# Patient Record
Sex: MALE | Race: WHITE | NOT HISPANIC OR LATINO | Employment: UNEMPLOYED | URBAN - METROPOLITAN AREA
[De-identification: names, ages, dates, MRNs, and addresses within clinical notes are randomized per-mention and may not be internally consistent; named-entity substitution may affect disease eponyms.]

---

## 2017-01-02 ENCOUNTER — TRANSFERRED RECORDS (OUTPATIENT)
Dept: HEALTH INFORMATION MANAGEMENT | Facility: CLINIC | Age: 52
End: 2017-01-02

## 2017-01-04 ENCOUNTER — RADIANT APPOINTMENT (OUTPATIENT)
Dept: GENERAL RADIOLOGY | Facility: CLINIC | Age: 52
End: 2017-01-04
Attending: NURSE PRACTITIONER

## 2017-01-04 ENCOUNTER — OFFICE VISIT (OUTPATIENT)
Dept: FAMILY MEDICINE | Facility: CLINIC | Age: 52
End: 2017-01-04

## 2017-01-04 VITALS
TEMPERATURE: 98.4 F | BODY MASS INDEX: 25.21 KG/M2 | WEIGHT: 161 LBS | OXYGEN SATURATION: 97 % | SYSTOLIC BLOOD PRESSURE: 131 MMHG | DIASTOLIC BLOOD PRESSURE: 86 MMHG | RESPIRATION RATE: 16 BRPM | HEART RATE: 110 BPM

## 2017-01-04 DIAGNOSIS — R07.81 RIB PAIN: ICD-10-CM

## 2017-01-04 DIAGNOSIS — R06.02 SOB (SHORTNESS OF BREATH): ICD-10-CM

## 2017-01-04 DIAGNOSIS — J20.9 ACUTE BRONCHITIS WITH SYMPTOMS > 10 DAYS: Primary | ICD-10-CM

## 2017-01-04 DIAGNOSIS — R07.0 THROAT PAIN: ICD-10-CM

## 2017-01-04 DIAGNOSIS — Z87.891 PERSONAL HISTORY OF SMOKING: ICD-10-CM

## 2017-01-04 LAB
DEPRECATED S PYO AG THROAT QL EIA: NORMAL
MICRO REPORT STATUS: NORMAL
SPECIMEN SOURCE: NORMAL

## 2017-01-04 PROCEDURE — 87880 STREP A ASSAY W/OPTIC: CPT | Performed by: NURSE PRACTITIONER

## 2017-01-04 PROCEDURE — 99214 OFFICE O/P EST MOD 30 MIN: CPT | Performed by: NURSE PRACTITIONER

## 2017-01-04 PROCEDURE — 87081 CULTURE SCREEN ONLY: CPT | Performed by: NURSE PRACTITIONER

## 2017-01-04 PROCEDURE — 71020 XR CHEST 2 VW: CPT

## 2017-01-04 RX ORDER — AZITHROMYCIN 250 MG/1
TABLET, FILM COATED ORAL
Qty: 6 TABLET | Refills: 0 | Status: SHIPPED | OUTPATIENT
Start: 2017-01-04 | End: 2017-01-09

## 2017-01-04 RX ORDER — ALBUTEROL SULFATE 0.83 MG/ML
1 SOLUTION RESPIRATORY (INHALATION) EVERY 6 HOURS PRN
Qty: 1 BOX | Refills: 0 | Status: SHIPPED | OUTPATIENT
Start: 2017-01-04 | End: 2017-01-13

## 2017-01-04 RX ORDER — ALBUTEROL SULFATE 90 UG/1
1-2 AEROSOL, METERED RESPIRATORY (INHALATION) EVERY 4 HOURS PRN
Qty: 1 INHALER | Refills: 3 | Status: CANCELLED | OUTPATIENT
Start: 2017-01-04

## 2017-01-04 RX ORDER — OXYCODONE AND ACETAMINOPHEN 5; 325 MG/1; MG/1
1 TABLET ORAL EVERY 6 HOURS PRN
Qty: 15 TABLET | Refills: 0 | Status: SHIPPED | OUTPATIENT
Start: 2017-01-04 | End: 2017-01-13

## 2017-01-04 NOTE — PROGRESS NOTES
SUBJECTIVE:                                                    Akshat Fragoso is a 51 year old male who presents to clinic today for the following health issues:      Acute Illness   Acute illness concerns: onset 2 weeks ago with c/o URi symptoms. He has had intermittent fever. He has a history of Pneumonia 5 years ago and his symptoms today are similar.   Onset: 2 weeks     Fever: YES- 1st week    Chills/Sweats: YES    Headache (location?): no     Sinus Pressure:no    Conjunctivitis:  no    Ear Pain: no    Rhinorrhea: YES    Congestion: YES    Sore Throat: YES     Cough: YES - lot of coughing    Wheeze: YES    Decreased Appetite: YES    Nausea: no     Vomiting: no     Diarrhea:  no     Dysuria/Freq.: no     Fatigue/Achiness: YES    Sick/Strep Exposure: no      Therapies Tried and outcome: ibuprofen    Right rib pain related to coughing.   -------------------------------------    Problem list and histories reviewed & adjusted, as indicated.  Additional history: as documented    Patient Active Problem List   Diagnosis     CARDIOVASCULAR SCREENING; LDL GOAL LESS THAN 160     Acute right lumbar radiculopathy     Personal history of smoking     Past Surgical History   Procedure Laterality Date     None         Social History   Substance Use Topics     Smoking status: Former Smoker -- 0.50 packs/day for 15 years     Types: Cigarettes     Quit date: 12/01/2016     Smokeless tobacco: Former User     Quit date: 10/24/2011     Alcohol Use: No     No family history on file.      Current Outpatient Prescriptions   Medication Sig Dispense Refill     azithromycin (ZITHROMAX) 250 MG tablet Two tablets first day, then one tablet daily for four days. 6 tablet 0     albuterol (2.5 MG/3ML) 0.083% neb solution Take 1 vial (2.5 mg) by nebulization every 6 hours as needed for shortness of breath / dyspnea or wheezing 1 Box 0     oxyCODONE-acetaminophen (PERCOCET) 5-325 MG per tablet Take 1 tablet by mouth every 6 hours as needed for  pain 15 tablet 0     Ibuprofen (ADVIL PO) Take 200 mg by mouth       Naproxen Sodium (ALEVE PO) Take 550 mg by mouth At Bedtime       HYDROcodone-acetaminophen (NORCO) 5-325 MG per tablet Take 1 tablet by mouth 2 times daily as needed for moderate to severe pain 20 tablet 0     Allergies   Allergen Reactions     No Known Allergies      BP Readings from Last 3 Encounters:   01/04/17 131/86   04/25/16 132/76   03/25/16 157/83    Wt Readings from Last 3 Encounters:   01/04/17 161 lb (73.029 kg)   04/25/16 157 lb (71.215 kg)   03/25/16 153 lb (69.4 kg)                  Labs reviewed in EPIC  Problem list, Medication list, Allergies, and Medical/Social/Surgical histories reviewed in [facility] and updated as appropriate.    ROS:  C: NEGATIVE for fever, chills  E/M: NEGATIVE for ear, mouth and throat problems  R: see HPI  CV: NEGATIVE for chest pain, palpitations  GI: NEGATIVE for nausea, vomiting, diarrhea  PSYCHIATRIC: NEGATIVE for changes in mood or affect  ROS otherwise negative      OBJECTIVE:                                                    /86 mmHg  Pulse 110  Temp(Src) 98.4  F (36.9  C) (Oral)  Resp 16  Wt 161 lb (73.029 kg)  SpO2 97%  Body mass index is 25.21 kg/(m^2).  EXAM:  Constitutional: healthy, alert, active and no distress  Neck: Neck supple. + adenopathy.  ENT: Bilateral TM's are normal.  Posterior oropharynx is clear.  Nares clear without congestion.  Cardiovascular: negative, PMI normal. No lifts, heaves, or thrills. RRR. No murmurs, clicks gallops or rub, No edema or JVD.  Respiratory: Respirations easy and regular. No respiratory distress. Lungs sounds with few fine crackles left mid lung field, otherwise diminished in the bases.  Skin: warm and dry  Psychiatric: mentation appears normal. and affect normal/bright    Chest xray: possible infiltrate left mid lung     ASSESSMENT/PLAN:                                                    (J20.9) Acute bronchitis with symptoms > 10 days  (primary  encounter diagnosis)  Comment: acute  Plan: azithromycin (ZITHROMAX) 250 MG tablet,         albuterol (2.5 MG/3ML) 0.083% neb solution        I did discuss with the patient I may see an infiltrate in the left mid lung field.  He is to take the antibiotics as prescribed.  He can use the nebulizer every 4 hours as needed for wheezing/cough (he owns a nebulizer machine).   He is to monitor for new or worsening symptoms and be rechecked as needed.  Otherwise he will take his medications and will take care of himself with a healthy diet, fluids, etc.    (R06.02) SOB (shortness of breath)  Comment:   Plan: XR Chest 2 Views, albuterol (2.5 MG/3ML) 0.083%        neb solution        As above    (R07.81) Rib pain  Comment:   Plan: oxyCODONE-acetaminophen (PERCOCET) 5-325 MG per        tablet        Use sparingly only for severe pain.     (Z87.891) Personal history of smoking  Comment:   Plan: congratulated on stopping smoking.  Continue to abstaine.    (R07.0) Throat pain  Comment:   Plan: Strep, Rapid Screen, Beta strep group A culture        RST negative.        VERONICA Doran [facility]

## 2017-01-04 NOTE — MR AVS SNAPSHOT
After Visit Summary   1/4/2017    Akshat Fragoso    MRN: [id number]           Patient Information     Date Of Birth          1965        Visit Information        Provider Department      1/4/2017 1:00 PM Denise White APRN [facility]        Today's Diagnoses     Acute bronchitis with symptoms > 10 days    -  1     SOB (shortness of breath)         Rib pain         Personal history of smoking         Throat pain           Care Instructions      Bronchitis, Antibiotic Treatment (Adult)    Bronchitis is an infection of the air passages (bronchial tubes) in your lungs. It often occurs when you have a cold. This illness is contagious during the first few days and is spread through the air by coughing and sneezing, or by direct contact (touching the sick person and then touching your own eyes, nose, or mouth).  Symptoms of bronchitis include cough with mucus (phlegm) and low-grade fever. Bronchitis usually lasts 7 to 14 days. Mild cases can be treated with simple home remedies. More severe infection is treated with an antibiotic.  Home care  Follow these guidelines when caring for yourself at home:    If your symptoms are severe, rest at home for the first 2 to 3 days. When you go back to your usual activities, don't let yourself get too tired.    Do not smoke. Also avoid being exposed to secondhand smoke.    You may use over-the-counter medicines to control fever or pain, unless another medicine was prescribed. (Note: If you have chronic liver or kidney disease or have ever had a stomach ulcer or gastrointestinal bleeding, talk with your healthcare provider before using these medicines. Also talk to your provider if you are taking medicine to prevent blood clots.) Aspirin should never be given to anyone younger than 18 years of age who is ill with a viral infection or fever. It may cause severe liver or brain damage.    Your appetite may be poor, so a light diet is  fine. Avoid dehydration by drinking 6 to 8 glasses of fluids per day (such as water, soft drinks, sports drinks, juices, tea, or soup). Extra fluids will help loosen secretions in the nose and lungs.    Over-the-counter cough, cold, and sore-throat medicines will not shorten the length of the illness, but they may be helpful to reduce symptoms. (Note: Do not use decongestants if you have high blood pressure.)    Finish all antibiotic medicine. Do this even if you are feeling better after only a few days.  Follow-up care  Follow up with your healthcare provider, or as advised. If you had an X-ray or ECG (electrocardiogram), a specialist will review it. You will be notified of any new findings that may affect your care.  Note: If you are age 65 or older, or if you have a chronic lung disease or condition that affects your immune system, or you smoke, talk to your healthcare provider about having pneumococcal vaccinations and a yearly influenza vaccination (flu shot).  When to seek medical advice  Call your healthcare provider right away if any of these occur:    Fever of 100.4 F (38 C) or higher    Coughing up increased amounts of colored sputum    Weakness, drowsiness, headache, facial pain, ear pain, or a stiff neck   Call 911, or get immediate medical care  Contact emergency services right away if any of these occur.    Coughing up blood    Worsening weakness, drowsiness, headache, or stiff neck    Trouble breathing, wheezing, or pain with breathing    8585-6996 [employer]. [address] [place] [address]. All rights reserved. This information is not intended as a substitute for professional medical care. Always follow your healthcare professional's instructions.              Follow-ups after your visit        Who to contact     If you have questions or need follow up information about today's clinic visit or your schedule please contact [facility] directly at  [phone].  Normal or non-critical lab and imaging results will be communicated to you by [employer], letter or phone within 4 business days after the clinic has received the results. If you do not hear from us within 7 days, please contact the clinic through [employer] or phone. If you have a critical or abnormal lab result, we will notify you by phone as soon as possible.  Submit refill requests through [employer] or call your pharmacy and they will forward the refill request to us. Please allow 3 business days for your refill to be completed.          Additional Information About Your Visit        [employer] Information     [employer] gives you secure access to your electronic health record. If you see a primary care provider, you can also send messages to your care team and make appointments. If you have questions, please call your primary care clinic.  If you do not have a primary care provider, please call [phone] and they will assist you.        Care EveryWhere ID     This is your Care EveryWhere ID. This could be used by other organizations to access your [place] medical records  [id number]        Your Vitals Were     Pulse Temperature Respirations Pulse Oximetry          110 98.4  F (36.9  C) (Oral) 16 97%         Blood Pressure from Last 3 Encounters:   01/04/17 131/86   04/25/16 132/76   03/25/16 157/83    Weight from Last 3 Encounters:   01/04/17 161 lb (73.029 kg)   04/25/16 157 lb (71.215 kg)   03/25/16 153 lb (69.4 kg)              We Performed the Following     Beta strep group A culture     Strep, Rapid Screen          Today's Medication Changes          These changes are accurate as of: 1/4/17  1:32 PM.  If you have any questions, ask your nurse or doctor.               Start taking these medicines.        Dose/Directions    albuterol (2.5 MG/3ML) 0.083% neb solution   Used for:  Acute bronchitis with symptoms > 10 days, SOB (shortness of breath)   Started by:  Denise White APRN CNP         Dose:  1 vial   Take 1 vial (2.5 mg) by nebulization every 6 hours as needed for shortness of breath / dyspnea or wheezing   Quantity:  1 Box   Refills:  0       azithromycin 250 MG tablet   Commonly known as:  ZITHROMAX   Used for:  Acute bronchitis with symptoms > 10 days   Started by:  Denise White APRN CNP        Two tablets first day, then one tablet daily for four days.   Quantity:  6 tablet   Refills:  0       oxyCODONE-acetaminophen 5-325 MG per tablet   Commonly known as:  PERCOCET   Used for:  Rib pain   Started by:  Denise White APRN CNP        Dose:  1 tablet   Take 1 tablet by mouth every 6 hours as needed for pain   Quantity:  15 tablet   Refills:  0            Where to get your medicines      These medications were sent to [facility] - [place], [place] - [address]  [address], [place] [address]     Phone:  [phone]    - albuterol (2.5 MG/3ML) 0.083% neb solution  - azithromycin 250 MG tablet      Some of these will need a paper prescription and others can be bought over the counter.  Ask your nurse if you have questions.     Bring a paper prescription for each of these medications    - oxyCODONE-acetaminophen 5-325 MG per tablet             Primary Care Provider Office Phone # Fax #    Camille Fajardo [phone]       [facility] [address] [facility] [address]        Thank you!     Thank you for choosing [facility]  for your care. Our goal is always to provide you with excellent care. Hearing back from our patients is one way we can continue to improve our services. Please take a few minutes to complete the written survey that you may receive in the mail after your visit with us. Thank you!             Your Updated Medication List - Protect others around you: Learn how to safely use, store and throw away your medicines at www.disposemymeds.org.          This list is accurate as of:  1/4/17  1:32 PM.  Always use your most recent med list.                   Brand Name Dispense Instructions for use    ADVIL PO      Take 200 mg by mouth       albuterol (2.5 MG/3ML) 0.083% neb solution     1 Box    Take 1 vial (2.5 mg) by nebulization every 6 hours as needed for shortness of breath / dyspnea or wheezing       ALEVE PO      Take 550 mg by mouth At Bedtime       azithromycin 250 MG tablet    ZITHROMAX    6 tablet    Two tablets first day, then one tablet daily for four days.       HYDROcodone-acetaminophen 5-325 MG per tablet    NORCO    20 tablet    Take 1 tablet by mouth 2 times daily as needed for moderate to severe pain       oxyCODONE-acetaminophen 5-325 MG per tablet    PERCOCET    15 tablet    Take 1 tablet by mouth every 6 hours as needed for pain

## 2017-01-04 NOTE — NURSING NOTE
"No chief complaint on file.      Initial /86 mmHg  Pulse 115  Temp(Src) 98.4  F (36.9  C) (Oral)  Resp 20  Wt 161 lb (73.029 kg)  SpO2 94% Estimated body mass index is 25.21 kg/(m^2) as calculated from the following:    Height as of 12/17/15: 5' 7\" (1.702 m).    Weight as of this encounter: 161 lb (73.029 kg).  BP completed using cuff size: negar Cee CMA      "

## 2017-01-04 NOTE — PATIENT INSTRUCTIONS
Bronchitis, Antibiotic Treatment (Adult)    Bronchitis is an infection of the air passages (bronchial tubes) in your lungs. It often occurs when you have a cold. This illness is contagious during the first few days and is spread through the air by coughing and sneezing, or by direct contact (touching the sick person and then touching your own eyes, nose, or mouth).  Symptoms of bronchitis include cough with mucus (phlegm) and low-grade fever. Bronchitis usually lasts 7 to 14 days. Mild cases can be treated with simple home remedies. More severe infection is treated with an antibiotic.  Home care  Follow these guidelines when caring for yourself at home:    If your symptoms are severe, rest at home for the first 2 to 3 days. When you go back to your usual activities, don't let yourself get too tired.    Do not smoke. Also avoid being exposed to secondhand smoke.    You may use over-the-counter medicines to control fever or pain, unless another medicine was prescribed. (Note: If you have chronic liver or kidney disease or have ever had a stomach ulcer or gastrointestinal bleeding, talk with your healthcare provider before using these medicines. Also talk to your provider if you are taking medicine to prevent blood clots.) Aspirin should never be given to anyone younger than 18 years of age who is ill with a viral infection or fever. It may cause severe liver or brain damage.    Your appetite may be poor, so a light diet is fine. Avoid dehydration by drinking 6 to 8 glasses of fluids per day (such as water, soft drinks, sports drinks, juices, tea, or soup). Extra fluids will help loosen secretions in the nose and lungs.    Over-the-counter cough, cold, and sore-throat medicines will not shorten the length of the illness, but they may be helpful to reduce symptoms. (Note: Do not use decongestants if you have high blood pressure.)    Finish all antibiotic medicine. Do this even if you are feeling better after only a  few days.  Follow-up care  Follow up with your healthcare provider, or as advised. If you had an X-ray or ECG (electrocardiogram), a specialist will review it. You will be notified of any new findings that may affect your care.  Note: If you are age 65 or older, or if you have a chronic lung disease or condition that affects your immune system, or you smoke, talk to your healthcare provider about having pneumococcal vaccinations and a yearly influenza vaccination (flu shot).  When to seek medical advice  Call your healthcare provider right away if any of these occur:    Fever of 100.4 F (38 C) or higher    Coughing up increased amounts of colored sputum    Weakness, drowsiness, headache, facial pain, ear pain, or a stiff neck   Call 911, or get immediate medical care  Contact emergency services right away if any of these occur.    Coughing up blood    Worsening weakness, drowsiness, headache, or stiff neck    Trouble breathing, wheezing, or pain with breathing    [id number] [employer]. [address] [place] [address], [place] [address]. All rights reserved. This information is not intended as a substitute for professional medical care. Always follow your healthcare professional's instructions.

## 2017-01-06 ENCOUNTER — TELEPHONE (OUTPATIENT)
Dept: FAMILY MEDICINE | Facility: CLINIC | Age: 52
End: 2017-01-06

## 2017-01-06 ENCOUNTER — TELEPHONE (OUTPATIENT)
Dept: CARDIOLOGY | Facility: CLINIC | Age: 52
End: 2017-01-06

## 2017-01-06 DIAGNOSIS — R93.89 ABNORMAL CHEST X-RAY: Primary | ICD-10-CM

## 2017-01-06 LAB
BACTERIA SPEC CULT: NORMAL
MICRO REPORT STATUS: NORMAL
SPECIMEN SOURCE: NORMAL

## 2017-01-06 NOTE — TELEPHONE ENCOUNTER
Telephone call to the patient.  He reports today that his URI symptoms are slightly improved, but he still feels SOB with walking up and down the steps.  He is using his nebulizers for wheezing which helps.    I discussed with Akshat that his chest xray was normal, indicating a heart problem more than a respiratory problem.  This was labeled by the radiologist as CHF which would require additional work up, medications, etc.  Per Akshat, he has had several people in his life recently pass away of heart related issues, and he would like to go directly to a cardiologist.  A referral was given.  I did offer to Akshat an appointment here for additional lab studies and consideration of meds, but he prefers to go to cardiology.  He will schedule an appointment to be seen ASAP.  I did discuss that if in the meantime his SOB worsens or if he develops any new symptoms, he should be evaluated, clinic appointment or ER.  He verbalizes understanding and was appreciative of the call.

## 2017-01-06 NOTE — TELEPHONE ENCOUNTER
PT's wife called and said pt has been scheduled to see  on 1/10 d/t enlarged heart shown on recent chest xray. She said PMD has been treating pt for pneumonia but he is not improving. She wanted to know if pt will need an EKG prior to OV. I told her  will evaluate pt and decide what further testing is advised, but typically cardiologist will have an EKG done at OV. She said they are without health insurance right now but will discuss their situation further at OV with . Kaise DHZ

## 2017-01-09 ENCOUNTER — APPOINTMENT (OUTPATIENT)
Dept: GENERAL RADIOLOGY | Facility: CLINIC | Age: 52
DRG: 286 | End: 2017-01-09
Attending: EMERGENCY MEDICINE

## 2017-01-09 ENCOUNTER — TELEPHONE (OUTPATIENT)
Dept: CARDIOLOGY | Facility: CLINIC | Age: 52
End: 2017-01-09

## 2017-01-09 ENCOUNTER — APPOINTMENT (OUTPATIENT)
Dept: CARDIOLOGY | Facility: CLINIC | Age: 52
DRG: 286 | End: 2017-01-09
Attending: FAMILY MEDICINE

## 2017-01-09 ENCOUNTER — HOSPITAL ENCOUNTER (INPATIENT)
Facility: CLINIC | Age: 52
LOS: 3 days | Discharge: HOME | DRG: 286 | End: 2017-01-12
Attending: FAMILY MEDICINE | Admitting: INTERNAL MEDICINE

## 2017-01-09 DIAGNOSIS — R06.09 DOE (DYSPNEA ON EXERTION): ICD-10-CM

## 2017-01-09 DIAGNOSIS — I50.23 ACUTE ON CHRONIC SYSTOLIC CONGESTIVE HEART FAILURE (H): Primary | ICD-10-CM

## 2017-01-09 DIAGNOSIS — I50.9 CHRONIC CONGESTIVE HEART FAILURE, UNSPECIFIED CONGESTIVE HEART FAILURE TYPE: ICD-10-CM

## 2017-01-09 LAB
ALBUMIN SERPL-MCNC: 3.5 G/DL (ref 3.4–5)
ALP SERPL-CCNC: 84 U/L (ref 40–150)
ALT SERPL W P-5'-P-CCNC: 111 U/L (ref 0–70)
ANION GAP SERPL CALCULATED.3IONS-SCNC: 8 MMOL/L (ref 3–14)
AST SERPL W P-5'-P-CCNC: 55 U/L (ref 0–45)
BASOPHILS # BLD AUTO: 0 10E9/L (ref 0–0.2)
BASOPHILS NFR BLD AUTO: 0.3 %
BILIRUB DIRECT SERPL-MCNC: 0.2 MG/DL (ref 0–0.2)
BILIRUB SERPL-MCNC: 0.5 MG/DL (ref 0.2–1.3)
BUN SERPL-MCNC: 14 MG/DL (ref 7–30)
CALCIUM SERPL-MCNC: 8.3 MG/DL (ref 8.5–10.1)
CHLORIDE SERPL-SCNC: 106 MMOL/L (ref 94–109)
CO2 SERPL-SCNC: 26 MMOL/L (ref 20–32)
CREAT SERPL-MCNC: 0.85 MG/DL (ref 0.66–1.25)
CREAT SERPL-MCNC: 0.97 MG/DL (ref 0.66–1.25)
DIFFERENTIAL METHOD BLD: ABNORMAL
EOSINOPHIL # BLD AUTO: 0.1 10E9/L (ref 0–0.7)
EOSINOPHIL NFR BLD AUTO: 0.7 %
ERYTHROCYTE [DISTWIDTH] IN BLOOD BY AUTOMATED COUNT: 13.8 % (ref 10–15)
GFR SERPL CREATININE-BSD FRML MDRD: 82 ML/MIN/1.7M2
GFR SERPL CREATININE-BSD FRML MDRD: ABNORMAL ML/MIN/1.7M2
GLUCOSE SERPL-MCNC: 120 MG/DL (ref 70–99)
HCT VFR BLD AUTO: 48 % (ref 40–53)
HGB BLD-MCNC: 15.1 G/DL (ref 13.3–17.7)
IMM GRANULOCYTES # BLD: 0 10E9/L (ref 0–0.4)
IMM GRANULOCYTES NFR BLD: 0.3 %
INR PPP: 1.13 (ref 0.86–1.14)
INTERPRETATION ECG - MUSE: NORMAL
LACTATE BLD-SCNC: 1.4 MMOL/L (ref 0.7–2.1)
LYMPHOCYTES # BLD AUTO: 2.1 10E9/L (ref 0.8–5.3)
LYMPHOCYTES NFR BLD AUTO: 17.5 %
MCH RBC QN AUTO: 30.6 PG (ref 26.5–33)
MCHC RBC AUTO-ENTMCNC: 31.5 G/DL (ref 31.5–36.5)
MCV RBC AUTO: 97 FL (ref 78–100)
MONOCYTES # BLD AUTO: 0.8 10E9/L (ref 0–1.3)
MONOCYTES NFR BLD AUTO: 6.6 %
NEUTROPHILS # BLD AUTO: 8.8 10E9/L (ref 1.6–8.3)
NEUTROPHILS NFR BLD AUTO: 74.6 %
NRBC # BLD AUTO: 0 10*3/UL
NRBC BLD AUTO-RTO: 0 /100
NT-PROBNP SERPL-MCNC: ABNORMAL PG/ML (ref 0–450)
PLATELET # BLD AUTO: 239 10E9/L (ref 150–450)
POTASSIUM SERPL-SCNC: 4.9 MMOL/L (ref 3.4–5.3)
PROCALCITONIN SERPL-MCNC: NORMAL NG/ML
PROT SERPL-MCNC: 5.9 G/DL (ref 6.8–8.8)
RBC # BLD AUTO: 4.94 10E12/L (ref 4.4–5.9)
SODIUM SERPL-SCNC: 140 MMOL/L (ref 133–144)
TROPONIN I SERPL-MCNC: 0.03 UG/L (ref 0–0.04)
TSH SERPL DL<=0.005 MIU/L-ACNC: 1.34 MU/L (ref 0.4–4)
WBC # BLD AUTO: 11.7 10E9/L (ref 4–11)

## 2017-01-09 PROCEDURE — 84145 PROCALCITONIN (PCT): CPT | Performed by: EMERGENCY MEDICINE

## 2017-01-09 PROCEDURE — 99285 EMERGENCY DEPT VISIT HI MDM: CPT | Mod: 25 | Performed by: FAMILY MEDICINE

## 2017-01-09 PROCEDURE — 94640 AIRWAY INHALATION TREATMENT: CPT

## 2017-01-09 PROCEDURE — 93306 TTE W/DOPPLER COMPLETE: CPT | Mod: 26 | Performed by: INTERNAL MEDICINE

## 2017-01-09 PROCEDURE — 99222 1ST HOSP IP/OBS MODERATE 55: CPT | Mod: 25 | Performed by: INTERNAL MEDICINE

## 2017-01-09 PROCEDURE — 83605 ASSAY OF LACTIC ACID: CPT | Performed by: STUDENT IN AN ORGANIZED HEALTH CARE EDUCATION/TRAINING PROGRAM

## 2017-01-09 PROCEDURE — 80048 BASIC METABOLIC PNL TOTAL CA: CPT | Performed by: EMERGENCY MEDICINE

## 2017-01-09 PROCEDURE — 83880 ASSAY OF NATRIURETIC PEPTIDE: CPT | Performed by: EMERGENCY MEDICINE

## 2017-01-09 PROCEDURE — 85610 PROTHROMBIN TIME: CPT | Performed by: EMERGENCY MEDICINE

## 2017-01-09 PROCEDURE — 21400006 ZZH R&B CCU INTERMEDIATE UMMC

## 2017-01-09 PROCEDURE — 25000125 ZZHC RX 250: Performed by: FAMILY MEDICINE

## 2017-01-09 PROCEDURE — 84443 ASSAY THYROID STIM HORMONE: CPT | Performed by: EMERGENCY MEDICINE

## 2017-01-09 PROCEDURE — 25000125 ZZHC RX 250: Performed by: STUDENT IN AN ORGANIZED HEALTH CARE EDUCATION/TRAINING PROGRAM

## 2017-01-09 PROCEDURE — 84484 ASSAY OF TROPONIN QUANT: CPT | Performed by: EMERGENCY MEDICINE

## 2017-01-09 PROCEDURE — 25000132 ZZH RX MED GY IP 250 OP 250 PS 637: Performed by: STUDENT IN AN ORGANIZED HEALTH CARE EDUCATION/TRAINING PROGRAM

## 2017-01-09 PROCEDURE — 25000308 HC RX OP HPI UCR WEL MED 250 IP 250: Performed by: STUDENT IN AN ORGANIZED HEALTH CARE EDUCATION/TRAINING PROGRAM

## 2017-01-09 PROCEDURE — 40000275 ZZH STATISTIC RCP TIME EA 10 MIN

## 2017-01-09 PROCEDURE — 80076 HEPATIC FUNCTION PANEL: CPT | Performed by: EMERGENCY MEDICINE

## 2017-01-09 PROCEDURE — 82565 ASSAY OF CREATININE: CPT | Performed by: STUDENT IN AN ORGANIZED HEALTH CARE EDUCATION/TRAINING PROGRAM

## 2017-01-09 PROCEDURE — 36415 COLL VENOUS BLD VENIPUNCTURE: CPT | Performed by: STUDENT IN AN ORGANIZED HEALTH CARE EDUCATION/TRAINING PROGRAM

## 2017-01-09 PROCEDURE — 93010 ELECTROCARDIOGRAM REPORT: CPT | Mod: Z6 | Performed by: FAMILY MEDICINE

## 2017-01-09 PROCEDURE — 93005 ELECTROCARDIOGRAM TRACING: CPT | Performed by: FAMILY MEDICINE

## 2017-01-09 PROCEDURE — 71020 XR CHEST 2 VW: CPT

## 2017-01-09 PROCEDURE — 93306 TTE W/DOPPLER COMPLETE: CPT

## 2017-01-09 PROCEDURE — 85025 COMPLETE CBC W/AUTO DIFF WBC: CPT | Performed by: EMERGENCY MEDICINE

## 2017-01-09 RX ORDER — POTASSIUM CHLORIDE 1.5 G/1.58G
20-40 POWDER, FOR SOLUTION ORAL
Status: DISCONTINUED | OUTPATIENT
Start: 2017-01-09 | End: 2017-01-12 | Stop reason: HOSPADM

## 2017-01-09 RX ORDER — NICOTINE 21 MG/24HR
1 PATCH, TRANSDERMAL 24 HOURS TRANSDERMAL DAILY PRN
Status: DISCONTINUED | OUTPATIENT
Start: 2017-01-09 | End: 2017-01-12 | Stop reason: HOSPADM

## 2017-01-09 RX ORDER — ALBUTEROL SULFATE 0.83 MG/ML
1 SOLUTION RESPIRATORY (INHALATION) EVERY 6 HOURS PRN
Status: DISCONTINUED | OUTPATIENT
Start: 2017-01-09 | End: 2017-01-12 | Stop reason: HOSPADM

## 2017-01-09 RX ORDER — LIDOCAINE 40 MG/G
CREAM TOPICAL
Status: DISCONTINUED | OUTPATIENT
Start: 2017-01-09 | End: 2017-01-11

## 2017-01-09 RX ORDER — OXYCODONE AND ACETAMINOPHEN 5; 325 MG/1; MG/1
1 TABLET ORAL EVERY 6 HOURS PRN
Status: DISCONTINUED | OUTPATIENT
Start: 2017-01-09 | End: 2017-01-12 | Stop reason: HOSPADM

## 2017-01-09 RX ORDER — NALOXONE HYDROCHLORIDE 0.4 MG/ML
.1-.4 INJECTION, SOLUTION INTRAMUSCULAR; INTRAVENOUS; SUBCUTANEOUS
Status: DISCONTINUED | OUTPATIENT
Start: 2017-01-09 | End: 2017-01-11

## 2017-01-09 RX ORDER — LISINOPRIL 2.5 MG/1
2.5 TABLET ORAL DAILY
Status: DISCONTINUED | OUTPATIENT
Start: 2017-01-09 | End: 2017-01-10

## 2017-01-09 RX ORDER — ALBUTEROL SULFATE 90 UG/1
2 AEROSOL, METERED RESPIRATORY (INHALATION) DAILY PRN
COMMUNITY
End: 2017-01-13

## 2017-01-09 RX ORDER — POTASSIUM CHLORIDE 7.45 MG/ML
10 INJECTION INTRAVENOUS
Status: DISCONTINUED | OUTPATIENT
Start: 2017-01-09 | End: 2017-01-12 | Stop reason: HOSPADM

## 2017-01-09 RX ORDER — IPRATROPIUM BROMIDE AND ALBUTEROL SULFATE 2.5; .5 MG/3ML; MG/3ML
3 SOLUTION RESPIRATORY (INHALATION) ONCE
Status: COMPLETED | OUTPATIENT
Start: 2017-01-09 | End: 2017-01-09

## 2017-01-09 RX ORDER — POTASSIUM CHLORIDE 29.8 MG/ML
20 INJECTION INTRAVENOUS
Status: DISCONTINUED | OUTPATIENT
Start: 2017-01-09 | End: 2017-01-12 | Stop reason: HOSPADM

## 2017-01-09 RX ORDER — MAGNESIUM SULFATE HEPTAHYDRATE 40 MG/ML
4 INJECTION, SOLUTION INTRAVENOUS EVERY 4 HOURS PRN
Status: DISCONTINUED | OUTPATIENT
Start: 2017-01-09 | End: 2017-01-12 | Stop reason: HOSPADM

## 2017-01-09 RX ORDER — FUROSEMIDE 10 MG/ML
20 INJECTION INTRAMUSCULAR; INTRAVENOUS ONCE
Status: COMPLETED | OUTPATIENT
Start: 2017-01-09 | End: 2017-01-09

## 2017-01-09 RX ORDER — POTASSIUM CHLORIDE 750 MG/1
20-40 TABLET, EXTENDED RELEASE ORAL
Status: DISCONTINUED | OUTPATIENT
Start: 2017-01-09 | End: 2017-01-12 | Stop reason: HOSPADM

## 2017-01-09 RX ORDER — ASPIRIN 81 MG/1
81 TABLET ORAL DAILY
Status: DISCONTINUED | OUTPATIENT
Start: 2017-01-09 | End: 2017-01-12 | Stop reason: HOSPADM

## 2017-01-09 RX ADMIN — ALBUTEROL SULFATE 2.5 MG: 2.5 SOLUTION RESPIRATORY (INHALATION) at 19:39

## 2017-01-09 RX ADMIN — ENOXAPARIN SODIUM 40 MG: 40 INJECTION SUBCUTANEOUS at 18:59

## 2017-01-09 RX ADMIN — FUROSEMIDE 20 MG: 10 INJECTION, SOLUTION INTRAVENOUS at 18:59

## 2017-01-09 RX ADMIN — OXYCODONE HYDROCHLORIDE AND ACETAMINOPHEN 1 TABLET: 5; 325 TABLET ORAL at 18:57

## 2017-01-09 RX ADMIN — LISINOPRIL 2.5 MG: 2.5 TABLET ORAL at 18:57

## 2017-01-09 RX ADMIN — IPRATROPIUM BROMIDE AND ALBUTEROL SULFATE 3 ML: .5; 3 SOLUTION RESPIRATORY (INHALATION) at 15:16

## 2017-01-09 ASSESSMENT — ENCOUNTER SYMPTOMS
COUGH: 1
UNEXPECTED WEIGHT CHANGE: 1
SHORTNESS OF BREATH: 1
WHEEZING: 0
FEVER: 0
MYALGIAS: 1

## 2017-01-09 NOTE — H&P
CARDS 1 Admission History and Physical  Akshat Fragoso MRN: [id number]  1965  Date of Admission:1/9/2017  Primary care provider: Denise White  ___________________________________          Assessment and Plan:   Akshat Fragoso is a 51 year old male with pmh significant for tobacco abuse admitted with newly diagnosed HFrEF with LVEF 10-15%.     # New Cardiomyopathy-  # Acute decompensated heart failure with reduced EF.  No heart history. TTE with severe LV dilation, severe diffuse hypokinesis, LVEF 10-15%, Mild RV dilation, global RV fxn mildly reduced. NT ProBNP 11K, CXR with pulmonary edema, dilated IVC with abnormal respiratory variation. Patient with JOHNSON, orthopnea, pnd, LE edema, productive cough with whitish sputum. Warm extremities, lactate wnl. Ddx includes ischemic CM vs alcohol/substance induced vs HIV vs infiltrative (hemochromatosis, sarcoid, amyloid).  -Lasix 20 mg IV  -Initiated lisinopril 2.5 mg for afterload reduction. Titrate as tolerates.   -Will not start BB during acute exacerbation  -Initiated asa 81 mg qday.  -check lipid panel, HIV, ferritin, iron panel, urine tox  -RHC, LHC this admission after volume optimization enough to lie flat  -MRI heart this admission     # tachycardia- 's. Sinus rhythm. Likely 2/2 acute on chronic heart failure exacerbation. Continue to monitor. Has had elevated HR's in the past. No known history of arrhythmias. TSH wnl.  -telemetry    # Rib pain- reports rib pain from coughing.   -consider dedicated rib series in am  -continue pta percocet    # tobacco dependence- 60 pack years  -counseling regarding cessation  -nicotine replacement products    FEN: No IVF, 2g Na diet, 1.5 L fluid restriction  Prophylaxis: enoxaparin  Consults: none  Code Status: Full  Disposition: Admit to cards 1 for workup of newly diagnosed HFrEF    Patient discussed with Dr. Moy.    Alicia Fernandes MD  Internal Medicine  PGY-3  [phone]    I have seen, interviewed, and examined patient. I have reviewed the laboratory tests, imaging, and other investigations. I have reviewed the management plan with the patient. I discussed with the team and agree with the findings and plan in this resident/fellow/nurse practitioner's note. In addition, changes in the physical examination, assessment and plan have been incorporated into the note by myself, as to make it a single cohesive document.       Brynn Moy MD, MS  Cardiology/Cardiac EP Attending Staff              Chief Complaint:   JOHNSON         History of Present Illness:   Akshat Fragoso is a 51 year old male with pmh significant for tobacco abuse admitted with JOHNSON and found to have HFrEF with LVEF 10-15%.     Patient and wife provide history. Patient has been having shortness of breath with activity for the past five years, worse for the past 1 year, even worse for the past 1 month and last night was unbearable. Had severe orthopnea, pnd, rib pain from coughing (on prn percocet), JOHNSON with minimal activity such as walking 10 feet, productive cough with whitish sputum (was greenish a couple weeks ago). Denies f/c, myalgias, arthralgias, chest pain or pressure, shortness of breath at rest, n/v. Denies alcohol use, history of chemo or radiation, methamphetamine use. Endorses marijuana use. Not on any medications chronically at home.    For the past five years, patient has been treated for pna when these episodes of sob occurred. Recently completed a zpac yesterday. Also with albuterol nebs, which he thinks help a little bit.     ROS:  Complete 10 point ROS was negative unless noted in the HPI.         Past Cardiac History:     Recent Cardiac Admissions: none    Last ECHO: 1/9/2017  Procedure  Echocardiogram with two-dimensional, color and spectral Doppler performed.  ______________________________________________________________________________     Interpretation Summary  Severe left  ventricular dilation is present. Severe diffuse hypokinesis is  present. The Ejection Fraction is estimated at 10-15%. Traced at 13%.  Mild right ventricular dilation is present. Global right ventricular function  is mildly reduced.  Left ventricular diastolic function is indeterminate.  Dilation of the inferior vena cava is present with abnormal respiratory  variation in diameter.  ______________________________________________________________________________           Left Ventricle  Severe left ventricular dilation is present. Severely (EF <30%) reduced left  ventricular function is present. The Ejection Fraction is estimated at 10-  15%. Left ventricular diastolic function is indeterminate. Severe diffuse  hypokinesis is present.     Right Ventricle  Mild right ventricular dilation is present. Global right ventricular function  is mildly reduced.  Atria  Moderate left atrial enlargement is present. Mild right atrial enlargement is  present.     Mitral Valve  Mild mitral annular calcification is present. Trace to mild mitral  insufficiency is present.     Aortic Valve  Aortic valve is normal in structure and function.     Tricuspid Valve  The tricuspid valve is normal. The right ventricular systolic pressure is  approximated at 19.4 mmHg plus the right atrial pressure. Trace tricuspid  insufficiency is present.     Pulmonic Valve  The pulmonic valve is normal.     Vessels  The thoracic aorta is normal. Dilation of the inferior vena cava is present  with abnormal respiratory variation in diameter. Estimated right atrial  pressure is 10-15 mmHg.  Pericardium  Trivial pericardial effusion is present.     Compared to Previous Study  Previous study not available for comparison.     ______________________________________________________________________________  MMode/2D Measurements & Calculations  IVSd: 0.95 cm  LVIDd: 7.2 cm  LVIDs: 6.7 cm  LVPWd: 0.67 cm  FS: 6.9 %  EDV(Teich): 274.3 ml  ESV(Teich): 233.4 ml  LV  "mass(C)d: 263.3 grams  Ao root diam: 3.3 cm  asc Aorta Diam: 3.1 cm  LVOT diam: 2.1 cm  LVOT area: 3.6 cm2  LA Volume (BP): 82.0 ml     LA Volume Index (BP): 44.6 ml/m2        Doppler Measurements & Calculations  MV E max jesús: 43.9 cm/sec  MV A max jesús: 19.7 cm/sec  MV E/A: 2.2  MV dec time: 0.29 sec  PA acc time: 0.09 sec  TR max jesús: 220.4 cm/sec  TR max P.4 mmHg  Lateral E/e': 7.9  Medial E/e': 10.9    Last Angiogram: none          Past Medical History:     Past Medical History   Diagnosis Date     CARDIOVASCULAR SCREENING; LDL GOAL LESS THAN 160 2010     Pneumonia 2011             Past Surgical History:      Past Surgical History   Procedure Laterality Date     None             Social History:     Tobacco: 60 pack years. 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   Mom with CAD and MI at age 65  Dad without CAD  No sudden cardiac death         Allergies:     Allergies   Allergen Reactions     No Known Allergies              Medications:     No current facility-administered medications on file prior to encounter.  Current Outpatient Prescriptions on File Prior to Encounter:  albuterol (2.5 MG/3ML) 0.083% neb solution Take 1 vial (2.5 mg) by nebulization every 6 hours as needed for shortness of breath / dyspnea or wheezing   oxyCODONE-acetaminophen (PERCOCET) 5-325 MG per tablet Take 1 tablet by mouth every 6 hours as needed for pain            Physical Exam:   Blood pressure 129/91, pulse 107, temperature 98.1  F (36.7  C), temperature source Oral, resp. rate 16, height 1.702 m (5' 7\"), weight 71.668 kg (158 lb), SpO2 94 %.    General: NAD, pleasant, worried  HEENT: NCAT, PERRL, EOMI, OP clear and non-erythematous  Neck: No LAD, + JVD to earlobe  CV: tachycardia, no m/r/g  Resp: Good air movement, diffuse rales, no rhonchi or " wheezes  Abd: s/nt/nd, bs normoactive  Extremities: wwp, 1+ LE edema  Skin: no lesions or rashes appreciated  Neuro/Psych: AAOx4, CN 2-12 grossly intact, moving all four extremities         Data:        CMP  Recent Labs  Lab 01/09/17  1001      POTASSIUM 4.9   CHLORIDE 106   CO2 26   ANIONGAP 8   *   BUN 14   CR 0.85   GFRESTIMATED >90Non  GFR Calc   TONY 8.3*   PROTTOTAL 5.9*   ALBUMIN 3.5   BILITOTAL 0.5   ALKPHOS 84   AST 55*   *     CBC  Recent Labs  Lab 01/09/17  1001   WBC 11.7*   RBC 4.94   HGB 15.1   HCT 48.0   MCV 97   MCH 30.6   MCHC 31.5   RDW 13.8        INR  Recent Labs  Lab 01/09/17  1001   INR 1.13       Troponin Lab Results   Component Value Date    TROPI 0.033 01/09/2017     NT-pro BNP 11K    Arterial Blood GasNo lab results found in last 7 days.    EKG: tachycardia, poor R wave progression, low voltage in limb leads    IMAGING:   CXR:1/9/17  Findings:  PA and lateral view of the chest was obtained. The  cardiomediastinal silhouette is enlarged, stable. The pulmonary  vasculature is indistinct. Stable blunting of bilateral costophrenic  angles representing scarring or pleural effusions. No appreciable  pneumothorax. Relatively stable to slightly increased prominent  interstitial markings. No acute airspace opacities. Thickening of the  right minor fissure.     The visualized upper abdomen is unremarkable. No acute osseous  abnormality. Increased kyphosis of the thoracic spine.                                                                       Impression:  Pulmonary venous congestion with increased interstitial  markings consistent with mild pulmonary edema.

## 2017-01-09 NOTE — ED PROVIDER NOTES
History     Chief Complaint   Patient presents with     Shortness of Breath     HPI  Akshat Fragoso is a 51 year old male with a history of pneumonia who presents to the emergency department today with complaints of worsening shortness of breath. Patient states he has had ongoing shortness of breath and cough for the past several months. He also reports worsening dyspnea on exertion and states it has gotten to the point where he gets winded after walking only about a block. He states he has had an ongoing cough that was initially productive for green/white phlegm, though now states it is productive for more clear phlegm. He denies any significant wheezing. He reports some bilateral rib discomfort with coughing though denies any chest pain. Patient states his cough is typically worse at night. Additionally, patient reports an approximate 60 lb weight loss over that past year, though states he has been under a great deal of stress. Patient was seen in clinic on 01/04 for these symptoms and was diagnosed with bronchitis and prescribed a Z-jose, albuterol nebs and an inhaler. He was also given some oxycodone for his rib pain. Patient also underwent a chest x-ray which showed cardiomegaly and possible CHF (impression below). Patient was referred to cardiologist and has an appointment with Dr. Mata of cardiology tomorrow morning. However, patient states his symptoms became acutely worse last night. He reported significant orthopnea and states he was unable to sleep because he was struggling to breathe. He also reports some swelling and cramping in his bilateral legs. He denies a history of DVT/PE. He states his sister-in-law was recently diagnosed with bronchitis and states he has spent a lot of time with her recently. He states he did not get the flu shot this season.      Chest X-ray 01/04/17:  IMPRESSION: Cardiomegaly. Blunting both costophrenic angles by fluid  or thickened pleura. Pulmonary venous congestion with  increased  interstitial markings in both lung bases consistent with edema.  Findings are consistent with congestive failure.      I have reviewed the Medications, Allergies, Past Medical and Surgical History, and Social History in the [employer] system.    Past Medical History   Diagnosis Date     CARDIOVASCULAR SCREENING; LDL GOAL LESS THAN 160 5/9/2010     Pneumonia 11/2011       Past Surgical History   Procedure Laterality Date     None         No family history on file.    Social History   Substance Use Topics     Smoking status: Former Smoker -- 0.50 packs/day for 15 years     Types: Cigarettes     Quit date: 12/01/2016     Smokeless tobacco: Former User     Quit date: 10/24/2011     Alcohol Use: No     Current Facility-Administered Medications   Medication     lidocaine 1 % 1 mL     lidocaine (LMX4) kit     sodium chloride (PF) 0.9% PF flush 3 mL     sodium chloride (PF) 0.9% PF flush 3 mL     albuterol neb solution 2.5 mg     oxyCODONE-acetaminophen (PERCOCET) 5-325 MG per tablet 1 tablet     HOLD nitroglycerin IF     enoxaparin (LOVENOX) injection 40 mg     aspirin EC EC tablet 81 mg     lisinopril (PRINIVIL/Zestril) tablet 2.5 mg     Reason beta blocker not prescribed     potassium chloride SA (K-DUR/KLOR-CON M) CR tablet 20-40 mEq     potassium chloride (KLOR-CON) Packet 20-40 mEq     potassium chloride 10 mEq in 100 mL intermittent infusion     potassium chloride 10 mEq in 100 mL intermittent infusion with 10 mg lidocaine     potassium chloride 20 mEq in 50 mL intermittent infusion     magnesium sulfate 2 g in NS intermittent infusion ([facility] or [facility] Cmpd)     magnesium sulfate 4 g in 100 mL sterile water (premade)     naloxone (NARCAN) injection 0.1-0.4 mg        Allergies   Allergen Reactions     No Known Allergies        Review of Systems   Constitutional: Positive for unexpected weight change. Negative for fever.   Respiratory: Positive for cough and shortness of breath. Negative for wheezing.     Cardiovascular: Positive for leg swelling. Negative for chest pain.   Musculoskeletal: Positive for myalgias (leg cramping).        Bilateral rib discomfort   All other systems reviewed and are negative.      Physical Exam   BP: 126/90 mmHg  Pulse: 107  Heart Rate: 107  Temp: 98.3  F (36.8  C)  Resp: 20  Weight: 72.712 kg (160 lb 4.8 oz)  SpO2: 97 %  Physical Exam   Constitutional: He is oriented to person, place, and time. No distress.   HENT:   Head: Atraumatic.   Mouth/Throat: Oropharynx is clear and moist. No oropharyngeal exudate.   Eyes: Pupils are equal, round, and reactive to light. No scleral icterus.   Neck: Normal range of motion. Neck supple.   Cardiovascular: Normal heart sounds and intact distal pulses.    Sinus tachycardia   Pulmonary/Chest: Breath sounds normal. No respiratory distress.   Abdominal: Soft. Bowel sounds are normal. There is no tenderness.   Musculoskeletal: Normal range of motion. He exhibits no edema or tenderness.   Lymphadenopathy:     He has no cervical adenopathy.   Neurological: He is alert and oriented to person, place, and time.   Skin: Skin is warm. No rash noted. He is not diaphoretic.   Psychiatric: He has a normal mood and affect.       ED Course   Procedures   10:08 AM  The patient was seen and examined by Dr. Webster in Room ED14.              EKG Interpretation:      Interpreted by Mando Webster  Time reviewed: 0956  Symptoms at time of EKG: shortness of breath   Rhythm: sinus tachycardia  Rate: 105 bpm  Axis: Left Axis Deviation  Ectopy: none  Conduction: left bundle branch block (incomplete)  ST Segments/ T Waves: Non-specific ST-T wave changes  Q Waves: none  Comparison to prior: Unchanged from 03-Apr-2012    Clinical Impression: sinus tachycardia, incomplete left bundle branch block and LVH          Critical Care time:  none               Labs Ordered and Resulted from Time of ED Arrival Up to the Time of Departure from the ED   CBC WITH PLATELETS DIFFERENTIAL -  Abnormal; Notable for the following:     WBC 11.7 (*)     Absolute Neutrophil 8.8 (*)     All other components within normal limits   NT PROBNP INPATIENT - Abnormal; Notable for the following:     N-Terminal Pro BNP Inpatient 97964 (*)     All other components within normal limits   BASIC METABOLIC PANEL - Abnormal; Notable for the following:     Glucose 120 (*)     Calcium 8.3 (*)     All other components within normal limits   HEPATIC PANEL - Abnormal; Notable for the following:     Protein Total 5.9 (*)      (*)     AST 55 (*)     All other components within normal limits   TROPONIN I   INR   TSH WITH FREE T4 REFLEX   PERIPHERAL IV CATHETER   CARDIAC RESPIRATORY MONITOR   PULSE OXIMETRY NURSING       Assessments & Plan (with Medical Decision Making)  Idiopathic cardiomyopathy with heart failure    This patient is a 51 year old male who presents with onset of shortness of breath about 2 weeks ago, progressively worsening over the past couple of days. Chest x-ray for primary care clinic showed pulmonary vascular congestion and there was concern about whether or not this was actually heart failure verses some other ongoing process. Upon arrival here the patient had stable vital signs but was extremely short of breath when he would try to stand up or walk around but O2 sats on room air were 94%. Heart rate though was in the 110 range when he was actively walking but would go down to 80 s at rest. His chest x-ray shows pulmonary venous congestion with some increased interstitial markings consistent with some mild pulmonary edema. His troponin was normal. EKG showed no acute ST-T wave changes and normal sinus rhythm. BNP [place] at [address]. I had a formal echo performed on him which did show that he had a 10-15% EF. Patient s labs including a CBC and BMP were otherwise unremarkable. Spoke to [facility] 1 staff regarding need for admission. I spoke with CARDS 1resident as well about the clinical exam findings and  lab and x-ray results. Patient will be admitted to the [facility] 1 service and patient is agreeable to coming in.      I have reviewed the nursing notes.    I have reviewed the findings, diagnosis, plan and need for follow up with the patient.    Current Discharge Medication List          Final diagnoses:   JOHNSON (dyspnea on exertion)   Chronic congestive heart failure, unspecified congestive heart failure type (H)   ITheresa, am serving as a trained medical scribe to document services personally performed by Mando Webster MD, based on the provider's statements to me.      Mando JAUREGUI MD, was physically present and have reviewed and verified the accuracy of this note documented by Theresa Mcelroy.       1/9/2017   [facility], [place], EMERGENCY DEPARTMENT      Mando Webster MD  01/09/17 1953

## 2017-01-09 NOTE — IP AVS SNAPSHOT
MRN:[id number]                      After Visit Summary   1/9/2017    Akshat Fragoso    MRN: [id number]           Thank you!     Thank you for choosing [facility] for your care. Our goal is always to provide you with excellent care. Hearing back from our patients is one way we can continue to improve our services. Please take a few minutes to complete the written survey that you may receive in the mail after you visit with us. Thank you!        Patient Information     Date Of Birth          1965        About your hospital stay     You were admitted on:  January 9, 2017 You last received care in the:  [address] [facility]    You were discharged on:  January 12, 2017        Reason for your hospital stay       You were in the hospital for a new diagnosis of heart failure                  Who to Call     For medical emergencies, please call 911.  For non-urgent questions about your medical care, please call your primary care provider or clinic, [phone]          Attending Provider     Provider    Mando Webster MD Chen, Brynn Graham MD       Primary Care Provider Office Phone # Fax #    VERONICA Hallman [facility] [phone]       [facility] [address] [place] [address]        After Care Instructions     Activity       Your activity upon discharge: activity as tolerated            Diet       Follow this diet upon discharge: Fluid restriction 2000 ML FLUID  Snacks/Supplements Adult: Ensure Plus (Adult); Between Meals  3 Gram Sodium Diet            Monitor and record       weight every day; if up 3lbs start taking lasix 20mg daily and call physician                  Follow-up Appointments     Adult [facility] Follow-up and recommended labs and tests       Follow up with primary care provider, Denise White, within 7 days for hospital follow- up.  No follow up labs or test are needed. Follow-up on TB Gold Quant Test      Appointments on  "[place] and/or [facility] (with [facility] or [facility] provider or service). Call [phone] if you haven't heard regarding these appointments within 7 days of discharge.                  Additional Services     Cardiology Eval Adult Referral       Your provider has referred you to:  [facility] [place] [phone]   [email or web address]    Please be aware that coverage of these services is subject to the terms and limitations of your health insurance plan.  Call member services at your health plan with any benefit or coverage questions.      Type of Referral:  EP Follow Up    Timeframe requested:  Please make appointment for 01/23/16 per Dr. Moy will fit into schedule for this day.     Please bring the following to your appointment:  >>   Any x-rays, CTs or MRIs which have been performed.  Contact the facility where they were done to arrange for  prior to your scheduled appointment.    >>   List of current medications  >>   This referral request   >>   Any documents/labs given to you for this referral                  General Recommendations To Control Heart Failure When You Get Home     Instructions To Patients and Families: Please read and check off each of these important instructions as you do them when you get home.           Weight and symptoms      ___ Put a scale in your bathroom  ___ Post a weight chart or calendar next to the scale  ___Weigh yourself every day as soon as you you get up in the morning. You should only be wearing your pajamas. Write your weight on the chart/calendar.  ___ Bring your weight chart/calendar with you to all appointments    ___Call your doctor if you gain 2 pounds in 1 day or 5 pounds in 1 week from your \"dry\" weight (baseline weight). Also call your doctor if you have shortness of breath that gets worse over time, leg swelling or fatigue.         Medicines and diet     ___ Make sure to take your " medicines as prescribed.    ___Bring a current list of your medicines and all of your medicine bottles with you to all appointments.    ___ Limit fluids if you still have swelling or shortness of breath, or if your doctor tells you to do so.  ___ Eat less than 2000 mg of sodium (salt) every day. Read food labels, and do not add salt to meals.   ___ Heart healthy diet with low fat and low cholesterol          Activity and suggested lifestyle changes    ___ Stay active. Talk to your doctor about an exercise program that is safe for your heart.    ___ Stop smoking. Reduce alcohol use.      ___ Lose weight if you are overweight. Extra weight puts a lot of stress on the heart.          Control for Leg Swelling   ___ Keep your legs elevated (raised) as needed for swelling. If swelling is uncomfortable or elevation doesn t help, ask your doctor about using ACE wrap or Jobst stockings.          Follow-up appointments   ___ Make a [facility] appointment with a basic metabolic panel lab draw 3 to 5 days after you leave the hospital. Call one of the following locations:   [facility]  [phone],  [facility] [phone],  [facility]  [phone].     ___ Make sure to take your medications as prescribed and bring an accurate list of your medications and your weight chart/calendar to your follow up appointment at the [facility] Clinic for continued education and adjustments          What is the CORE clinic?    The C.O.R.E (Cardiomyopathy, Optimization, Rehabilitation, Education) Clinic is a heart failure specialty clinic within the [facility]. At [facility].O.R.E., you will work with nurse practitioners to carefully adjust medicines, get education and learn who and when to call if symptoms appear. [facility].O.R.E nurses specialize in helping you:    better understand your disease.    slow the progress of your  "disease.    improve the length and quality of your life.    detect future heart problems before they become life threatening.    avoid hospital stays.            Pending Results     Date and Time Order Name Status Description    1/11/2017 1622 M Tuberculosis by Quantiferon ! Follow QTB collection process In process             Statement of Approval     Ordered          01/12/17 1224  I have reviewed and agree with all the recommendations and orders detailed in this document.   EFFECTIVE NOW     Approved and electronically signed by:  Brynn Moy MD             Admission Information        Provider Department Dept Phone    1/9/2017 Brynn Moy MD [facility] [phone]      Your Vitals Were     Blood Pressure Pulse Temperature    96/68 mmHg 107 98.2  F (36.8  C) (Oral)    Respirations Height Weight    18 1.702 m (5' 7\") 65.862 kg (145 lb 3.2 oz)    BMI (Body Mass Index) Pulse Oximetry       22.74 kg/m2 96%       MyChart Information     [facility] gives you secure access to your electronic health record. If you see a primary care provider, you can also send messages to your care team and make appointments. If you have questions, please call your primary care clinic.  If you do not have a primary care provider, please call [phone] and they will assist you.        Care EveryWhere ID     This is your Care EveryWhere ID. This could be used by other organizations to access your [facility] medical records  [id number]           Review of your medicines      START taking        Dose / Directions    aspirin 81 MG EC tablet   Used for:  Chronic congestive heart failure, unspecified congestive heart failure type (H)        Dose:  81 mg   Take 1 tablet (81 mg) by mouth daily   Quantity:  30 tablet   Refills:  2       furosemide 20 MG tablet   Commonly known as:  LASIX   Used for:  JOHNSON (dyspnea on exertion), Chronic congestive heart failure, unspecified congestive heart failure type (H), Acute on chronic systolic congestive " heart failure (H)        Dose:  20 mg   Take 1 tablet (20 mg) by mouth daily as needed Only take if weight up by 3lbs   Quantity:  60 tablet   Refills:  1       lisinopril 5 MG tablet   Commonly known as:  PRINIVIL/ZESTRIL   Used for:  Chronic congestive heart failure, unspecified congestive heart failure type (H)        Dose:  5 mg   Take 1 tablet (5 mg) by mouth daily   Quantity:  30 tablet   Refills:  2       metoprolol 25 MG 24 hr tablet   Commonly known as:  TOPROL-XL   Used for:  Chronic congestive heart failure, unspecified congestive heart failure type (H)        Dose:  12.5 mg   Take 0.5 tablets (12.5 mg) by mouth daily   Quantity:  15 tablet   Refills:  2         CONTINUE these medicines which have NOT CHANGED        Dose / Directions    * albuterol 108 (90 BASE) MCG/ACT Inhaler   Commonly known as:  PROAIR HFA/PROVENTIL HFA/VENTOLIN HFA        Dose:  2 puff   Inhale 2 puffs into the lungs daily as needed for shortness of breath / dyspnea or wheezing   Refills:  0       * albuterol (2.5 MG/3ML) 0.083% neb solution   Used for:  Acute bronchitis with symptoms > 10 days, SOB (shortness of breath)        Dose:  1 vial   Take 1 vial (2.5 mg) by nebulization every 6 hours as needed for shortness of breath / dyspnea or wheezing   Quantity:  1 Box   Refills:  0       oxyCODONE-acetaminophen 5-325 MG per tablet   Commonly known as:  PERCOCET   Used for:  Rib pain        Dose:  1 tablet   Take 1 tablet by mouth every 6 hours as needed for pain   Quantity:  15 tablet   Refills:  0       * Notice:  This list has 2 medication(s) that are the same as other medications prescribed for you. Read the directions carefully, and ask your doctor or other care provider to review them with you.         Where to get your medicines      These medications were sent to [facility] - [place], [place] - [address]  [address] [facility] [address] [address]     Phone:  [phone]    - aspirin 81 MG EC  tablet  - furosemide 20 MG tablet  - lisinopril 5 MG tablet  - metoprolol 25 MG 24 hr tablet             Protect others around you: Learn how to safely use, store and throw away your medicines at www.disposemymeds.org.             Medication List: This is a list of all your medications and when to take them. Check marks below indicate your daily home schedule. Keep this list as a reference.      Medications           Morning Afternoon Evening Bedtime As Needed    * albuterol 108 (90 BASE) MCG/ACT Inhaler   Commonly known as:  PROAIR HFA/PROVENTIL HFA/VENTOLIN HFA   Inhale 2 puffs into the lungs daily as needed for shortness of breath / dyspnea or wheezing                                * albuterol (2.5 MG/3ML) 0.083% neb solution   Take 1 vial (2.5 mg) by nebulization every 6 hours as needed for shortness of breath / dyspnea or wheezing   Last time this was given:  2.5 mg on 1/11/2017  7:43 AM                                aspirin 81 MG EC tablet   Take 1 tablet (81 mg) by mouth daily   Last time this was given:  81 mg on 1/12/2017  8:12 AM                                furosemide 20 MG tablet   Commonly known as:  LASIX   Take 1 tablet (20 mg) by mouth daily as needed Only take if weight up by 3lbs                                lisinopril 5 MG tablet   Commonly known as:  PRINIVIL/ZESTRIL   Take 1 tablet (5 mg) by mouth daily   Last time this was given:  5 mg on 1/12/2017 11:28 AM                                metoprolol 25 MG 24 hr tablet   Commonly known as:  TOPROL-XL   Take 0.5 tablets (12.5 mg) by mouth daily   Last time this was given:  12.5 mg on 1/12/2017  8:12 AM                                oxyCODONE-acetaminophen 5-325 MG per tablet   Commonly known as:  PERCOCET   Take 1 tablet by mouth every 6 hours as needed for pain   Last time this was given:  1 tablet on 1/12/2017  1:27 PM                                * Notice:  This list has 2 medication(s) that are the same as other medications prescribed  for you. Read the directions carefully, and ask your doctor or other care provider to review them with you.

## 2017-01-09 NOTE — IP AVS SNAPSHOT
[address] [address]    Phone:  [phone]                                       After Visit Summary   1/9/2017    Akshat Fragoso    MRN: [id number]           After Visit Summary Signature Page     I have received my discharge instructions, and my questions have been answered. I have discussed any challenges I see with this plan with the nurse or doctor.    ..........................................................................................................................................  Patient/Patient Representative Signature      ..........................................................................................................................................  Patient Representative Print Name and Relationship to Patient    ..................................................               ................................................  Date                                            Time    ..........................................................................................................................................  Reviewed by Signature/Title    ...................................................              ..............................................  Date                                                            Time

## 2017-01-09 NOTE — TELEPHONE ENCOUNTER
"Wife called requesting an OV today for her , new patient consult 1-10-17 with Dr. Mata. Patient was seen at [facility] Friday 1-6-17 for increased congestion, shortness of breath. Per wife \"Fluid on CXR.\". Wife states PMD suggested \"possible CHF\". Wife states patientis worse,  did not sleep last night due to shortness of breath and difficulty breathing. Patient has a productive  Cough,  is complaining of \"terrible bilateral leg pain\", and feels terrible. Wife was thinking of taking patient to ER.   Wife to take patient to ER for evaluation. OV as scheduled tomorrow.    "

## 2017-01-09 NOTE — ED NOTES
"Arrived to ED d/t c/o \"heart problems\" and shortness of breath, was treated w/ Z-pack and albuterol nebs and inhalers, was originally told it was pneumonia but was called a couple days ago and told it was fluid from his heart, was scheduled to see cardiologist tomorrow but symptoms have worsened, VSS upon arrival  "

## 2017-01-10 ENCOUNTER — APPOINTMENT (OUTPATIENT)
Dept: OCCUPATIONAL THERAPY | Facility: CLINIC | Age: 52
DRG: 286 | End: 2017-01-10
Attending: STUDENT IN AN ORGANIZED HEALTH CARE EDUCATION/TRAINING PROGRAM

## 2017-01-10 ENCOUNTER — APPOINTMENT (OUTPATIENT)
Dept: CT IMAGING | Facility: CLINIC | Age: 52
DRG: 286 | End: 2017-01-10

## 2017-01-10 PROBLEM — I50.21 ACUTE SYSTOLIC HEART FAILURE (H): Status: ACTIVE | Noted: 2017-01-10

## 2017-01-10 PROBLEM — I42.9 CARDIOMYOPATHY, UNSPECIFIED (H): Status: ACTIVE | Noted: 2017-01-10

## 2017-01-10 LAB
ALBUMIN UR-MCNC: NEGATIVE MG/DL
AMORPH CRY #/AREA URNS HPF: ABNORMAL /HPF
AMPHETAMINES UR QL SCN: ABNORMAL
ANION GAP SERPL CALCULATED.3IONS-SCNC: 7 MMOL/L (ref 3–14)
APPEARANCE UR: ABNORMAL
BARBITURATES UR QL: ABNORMAL
BASOPHILS # BLD AUTO: 0 10E9/L (ref 0–0.2)
BASOPHILS NFR BLD AUTO: 0.2 %
BENZODIAZ UR QL: ABNORMAL
BILIRUB UR QL STRIP: NEGATIVE
BUN SERPL-MCNC: 13 MG/DL (ref 7–30)
CALCIUM SERPL-MCNC: 8 MG/DL (ref 8.5–10.1)
CANNABINOIDS UR QL SCN: ABNORMAL
CHLORIDE SERPL-SCNC: 106 MMOL/L (ref 94–109)
CHOLEST SERPL-MCNC: 124 MG/DL
CO2 SERPL-SCNC: 27 MMOL/L (ref 20–32)
COCAINE UR QL: ABNORMAL
COLOR UR AUTO: YELLOW
CREAT SERPL-MCNC: 0.9 MG/DL (ref 0.66–1.25)
DIFFERENTIAL METHOD BLD: NORMAL
EOSINOPHIL # BLD AUTO: 0.2 10E9/L (ref 0–0.7)
EOSINOPHIL NFR BLD AUTO: 1.4 %
ERYTHROCYTE [DISTWIDTH] IN BLOOD BY AUTOMATED COUNT: 13.6 % (ref 10–15)
ETHANOL UR QL SCN: ABNORMAL
FERRITIN SERPL-MCNC: 90 NG/ML (ref 26–388)
GFR SERPL CREATININE-BSD FRML MDRD: 90 ML/MIN/1.7M2
GLUCOSE SERPL-MCNC: 102 MG/DL (ref 70–99)
GLUCOSE UR STRIP-MCNC: NEGATIVE MG/DL
HCT VFR BLD AUTO: 48.2 % (ref 40–53)
HDLC SERPL-MCNC: 39 MG/DL
HGB BLD-MCNC: 15.4 G/DL (ref 13.3–17.7)
HGB UR QL STRIP: NEGATIVE
HIV 1+2 AB+HIV1 P24 AG SERPL QL IA: NORMAL
IMM GRANULOCYTES # BLD: 0 10E9/L (ref 0–0.4)
IMM GRANULOCYTES NFR BLD: 0.2 %
INR PPP: 1.16 (ref 0.86–1.14)
IRON SATN MFR SERPL: 29 % (ref 15–46)
IRON SERPL-MCNC: 93 UG/DL (ref 35–180)
KETONES UR STRIP-MCNC: NEGATIVE MG/DL
LDLC SERPL CALC-MCNC: 64 MG/DL
LEUKOCYTE ESTERASE UR QL STRIP: NEGATIVE
LYMPHOCYTES # BLD AUTO: 3.7 10E9/L (ref 0.8–5.3)
LYMPHOCYTES NFR BLD AUTO: 34.8 %
MAGNESIUM SERPL-MCNC: 2.1 MG/DL (ref 1.6–2.3)
MCH RBC QN AUTO: 31 PG (ref 26.5–33)
MCHC RBC AUTO-ENTMCNC: 32 G/DL (ref 31.5–36.5)
MCV RBC AUTO: 97 FL (ref 78–100)
MONOCYTES # BLD AUTO: 0.9 10E9/L (ref 0–1.3)
MONOCYTES NFR BLD AUTO: 8.1 %
NEUTROPHILS # BLD AUTO: 5.9 10E9/L (ref 1.6–8.3)
NEUTROPHILS NFR BLD AUTO: 55.3 %
NITRATE UR QL: NEGATIVE
NONHDLC SERPL-MCNC: 84 MG/DL
NRBC # BLD AUTO: 0 10*3/UL
NRBC BLD AUTO-RTO: 0 /100
OPIATES UR QL SCN: ABNORMAL
PCP UR QL SCN: ABNORMAL
PH UR STRIP: 7.5 PH (ref 5–7)
PLATELET # BLD AUTO: 219 10E9/L (ref 150–450)
POTASSIUM SERPL-SCNC: 4.4 MMOL/L (ref 3.4–5.3)
PSA SERPL-MCNC: 0.32 UG/L (ref 0–4)
RADIOLOGIST FLAGS: NORMAL
RBC # BLD AUTO: 4.97 10E12/L (ref 4.4–5.9)
RBC #/AREA URNS AUTO: 0 /HPF (ref 0–2)
SODIUM SERPL-SCNC: 140 MMOL/L (ref 133–144)
SP GR UR STRIP: 1.01 (ref 1–1.03)
TIBC SERPL-MCNC: 324 UG/DL (ref 240–430)
TRANSFERRIN SERPL-MCNC: 243 MG/DL (ref 210–360)
TRIGL SERPL-MCNC: 103 MG/DL
URN SPEC COLLECT METH UR: ABNORMAL
UROBILINOGEN UR STRIP-MCNC: 4 MG/DL (ref 0–2)
WBC # BLD AUTO: 10.7 10E9/L (ref 4–11)
WBC #/AREA URNS AUTO: 0 /HPF (ref 0–2)

## 2017-01-10 PROCEDURE — 80307 DRUG TEST PRSMV CHEM ANLYZR: CPT | Performed by: STUDENT IN AN ORGANIZED HEALTH CARE EDUCATION/TRAINING PROGRAM

## 2017-01-10 PROCEDURE — 80320 DRUG SCREEN QUANTALCOHOLS: CPT | Performed by: STUDENT IN AN ORGANIZED HEALTH CARE EDUCATION/TRAINING PROGRAM

## 2017-01-10 PROCEDURE — 40000141 ZZH STATISTIC PERIPHERAL IV START W/O US GUIDANCE

## 2017-01-10 PROCEDURE — 82728 ASSAY OF FERRITIN: CPT | Performed by: STUDENT IN AN ORGANIZED HEALTH CARE EDUCATION/TRAINING PROGRAM

## 2017-01-10 PROCEDURE — 40000275 ZZH STATISTIC RCP TIME EA 10 MIN

## 2017-01-10 PROCEDURE — 25000132 ZZH RX MED GY IP 250 OP 250 PS 637: Performed by: INTERNAL MEDICINE

## 2017-01-10 PROCEDURE — 74176 CT ABD & PELVIS W/O CONTRAST: CPT

## 2017-01-10 PROCEDURE — 99232 SBSQ HOSP IP/OBS MODERATE 35: CPT | Mod: GC | Performed by: INTERNAL MEDICINE

## 2017-01-10 PROCEDURE — 25000125 ZZHC RX 250: Performed by: INTERNAL MEDICINE

## 2017-01-10 PROCEDURE — 25000308 HC RX OP HPI UCR WEL MED 250 IP 250: Performed by: STUDENT IN AN ORGANIZED HEALTH CARE EDUCATION/TRAINING PROGRAM

## 2017-01-10 PROCEDURE — 21400006 ZZH R&B CCU INTERMEDIATE UMMC

## 2017-01-10 PROCEDURE — 97535 SELF CARE MNGMENT TRAINING: CPT | Mod: GO | Performed by: OCCUPATIONAL THERAPIST

## 2017-01-10 PROCEDURE — 81001 URINALYSIS AUTO W/SCOPE: CPT | Performed by: STUDENT IN AN ORGANIZED HEALTH CARE EDUCATION/TRAINING PROGRAM

## 2017-01-10 PROCEDURE — 85025 COMPLETE CBC W/AUTO DIFF WBC: CPT | Performed by: STUDENT IN AN ORGANIZED HEALTH CARE EDUCATION/TRAINING PROGRAM

## 2017-01-10 PROCEDURE — 97165 OT EVAL LOW COMPLEX 30 MIN: CPT | Mod: GO | Performed by: OCCUPATIONAL THERAPIST

## 2017-01-10 PROCEDURE — 84153 ASSAY OF PSA TOTAL: CPT | Performed by: STUDENT IN AN ORGANIZED HEALTH CARE EDUCATION/TRAINING PROGRAM

## 2017-01-10 PROCEDURE — 80048 BASIC METABOLIC PNL TOTAL CA: CPT | Performed by: STUDENT IN AN ORGANIZED HEALTH CARE EDUCATION/TRAINING PROGRAM

## 2017-01-10 PROCEDURE — 83550 IRON BINDING TEST: CPT | Performed by: STUDENT IN AN ORGANIZED HEALTH CARE EDUCATION/TRAINING PROGRAM

## 2017-01-10 PROCEDURE — 94640 AIRWAY INHALATION TREATMENT: CPT

## 2017-01-10 PROCEDURE — 83735 ASSAY OF MAGNESIUM: CPT | Performed by: STUDENT IN AN ORGANIZED HEALTH CARE EDUCATION/TRAINING PROGRAM

## 2017-01-10 PROCEDURE — 25000132 ZZH RX MED GY IP 250 OP 250 PS 637: Performed by: STUDENT IN AN ORGANIZED HEALTH CARE EDUCATION/TRAINING PROGRAM

## 2017-01-10 PROCEDURE — 87389 HIV-1 AG W/HIV-1&-2 AB AG IA: CPT | Performed by: STUDENT IN AN ORGANIZED HEALTH CARE EDUCATION/TRAINING PROGRAM

## 2017-01-10 PROCEDURE — 84466 ASSAY OF TRANSFERRIN: CPT | Performed by: STUDENT IN AN ORGANIZED HEALTH CARE EDUCATION/TRAINING PROGRAM

## 2017-01-10 PROCEDURE — 40000133 ZZH STATISTIC OT WARD VISIT: Performed by: OCCUPATIONAL THERAPIST

## 2017-01-10 PROCEDURE — 80061 LIPID PANEL: CPT | Performed by: STUDENT IN AN ORGANIZED HEALTH CARE EDUCATION/TRAINING PROGRAM

## 2017-01-10 PROCEDURE — 83540 ASSAY OF IRON: CPT | Performed by: STUDENT IN AN ORGANIZED HEALTH CARE EDUCATION/TRAINING PROGRAM

## 2017-01-10 PROCEDURE — 85610 PROTHROMBIN TIME: CPT | Performed by: STUDENT IN AN ORGANIZED HEALTH CARE EDUCATION/TRAINING PROGRAM

## 2017-01-10 PROCEDURE — 36415 COLL VENOUS BLD VENIPUNCTURE: CPT | Performed by: STUDENT IN AN ORGANIZED HEALTH CARE EDUCATION/TRAINING PROGRAM

## 2017-01-10 RX ORDER — LISINOPRIL 5 MG/1
5 TABLET ORAL DAILY
Status: DISCONTINUED | OUTPATIENT
Start: 2017-01-10 | End: 2017-01-12 | Stop reason: HOSPADM

## 2017-01-10 RX ORDER — FUROSEMIDE 10 MG/ML
20 INJECTION INTRAMUSCULAR; INTRAVENOUS ONCE
Status: COMPLETED | OUTPATIENT
Start: 2017-01-10 | End: 2017-01-10

## 2017-01-10 RX ORDER — HEPARIN SODIUM 5000 [USP'U]/.5ML
5000 INJECTION, SOLUTION INTRAVENOUS; SUBCUTANEOUS EVERY 12 HOURS
Status: DISCONTINUED | OUTPATIENT
Start: 2017-01-10 | End: 2017-01-12 | Stop reason: HOSPADM

## 2017-01-10 RX ORDER — LANOLIN ALCOHOL/MO/W.PET/CERES
100 CREAM (GRAM) TOPICAL DAILY
Status: DISCONTINUED | OUTPATIENT
Start: 2017-01-10 | End: 2017-01-12 | Stop reason: HOSPADM

## 2017-01-10 RX ORDER — LIDOCAINE 40 MG/G
CREAM TOPICAL
Status: DISCONTINUED | OUTPATIENT
Start: 2017-01-10 | End: 2017-01-11 | Stop reason: HOSPADM

## 2017-01-10 RX ORDER — SODIUM CHLORIDE 9 MG/ML
INJECTION, SOLUTION INTRAVENOUS CONTINUOUS
Status: DISCONTINUED | OUTPATIENT
Start: 2017-01-11 | End: 2017-01-11 | Stop reason: HOSPADM

## 2017-01-10 RX ORDER — MULTIPLE VITAMINS W/ MINERALS TAB 9MG-400MCG
1 TAB ORAL DAILY
Status: DISCONTINUED | OUTPATIENT
Start: 2017-01-10 | End: 2017-01-12 | Stop reason: HOSPADM

## 2017-01-10 RX ADMIN — Medication 100 MG: at 16:06

## 2017-01-10 RX ADMIN — MULTIPLE VITAMINS W/ MINERALS TAB 1 TABLET: TAB at 16:06

## 2017-01-10 RX ADMIN — OXYCODONE HYDROCHLORIDE AND ACETAMINOPHEN 1 TABLET: 5; 325 TABLET ORAL at 10:40

## 2017-01-10 RX ADMIN — FUROSEMIDE 20 MG: 10 INJECTION, SOLUTION INTRAVENOUS at 12:33

## 2017-01-10 RX ADMIN — ALBUTEROL SULFATE 2.5 MG: 2.5 SOLUTION RESPIRATORY (INHALATION) at 06:37

## 2017-01-10 RX ADMIN — LISINOPRIL 5 MG: 5 TABLET ORAL at 10:40

## 2017-01-10 RX ADMIN — OXYCODONE HYDROCHLORIDE AND ACETAMINOPHEN 1 TABLET: 5; 325 TABLET ORAL at 18:41

## 2017-01-10 RX ADMIN — NICOTINE 1 PATCH: 21 PATCH TRANSDERMAL at 21:48

## 2017-01-10 RX ADMIN — HEPARIN SODIUM 5000 UNITS: 5000 INJECTION, SOLUTION INTRAVENOUS; SUBCUTANEOUS at 18:43

## 2017-01-10 NOTE — PROGRESS NOTES
"Cards 1 Progress Note    CC: New CM    Interval: Diuresed well with single dose of lasix. Feels better. Ambulating to Br without SOB. No signs of cardiogenic shock. Plan for addiitonal diuresis and increase ACEi today, Plan for angio/rhc/ mri tomorrow    ROS: complete ROS per hpi otherwise negative    In 360  UOP 1320  Net negative 960    160->158->151 lbs    Current meds  Lisinopriol 2.5  Lasix 20 iv x 1  LMWH 40  Nicotine TD    /88 mmHg  Pulse 107  Temp(Src) 98.6  F (37  C) (Oral)  Resp 16  Ht 1.702 m (5' 7\")  Wt 68.901 kg (151 lb 14.4 oz)  BMI 23.79 kg/m2  SpO2 97%  Comf, NAD, cachectic  JVP about 8 cm  Lungs rales present in bases b/l, otherwise good a/e  S1s2+s3  Trace LE edema, warm and well perfused    Last Basic Metabolic Panel:  NA      140   1/10/2017   POTASSIUM      4.4   1/10/2017  CHLORIDE      106   1/10/2017  TONY      8.0   1/10/2017  CO2       27   1/10/2017  BUN       13   1/10/2017  CR     0.90   1/10/2017  GLC      102   1/10/2017    WBC     10.7   1/10/2017  HGB     15.4   1/10/2017  PLT      219   1/10/2017    NTPBNP 11K  TSH wnl  LDL 64  HDL 39  Ferritin 90  Lactate 1.4  Trop negative    Echo: Severe left ventricular dilation is present (LVIDd 7.2). Severe diffuse hypokinesis is  present. The Ejection Fraction is estimated at 10-15%. Traced at 13%.  Mild right ventricular dilation is present. Global right ventricular function  is mildly reduced.  Left ventricular diastolic function is indeterminate.  Dilation of the inferior vena cava is present with abnormal respiratory  variation in diameter.    CXR: Impression:  Pulmonary venous congestion with increased interstitial  markings consistent with mild pulmonary edema.    A/P: 51M PMHx tobacco use admitted with 50 lb weight loss and new dilated CM (EF 13%).    # ADHF, new dx dilated cardiomyopathy, etiology unclear, dilation (7.2 cm) implies chronicity, pt denies drug/etoh use, no signs of cardiogenic shock, negative lactate  - " increase lisinopril to 5  - dose lasix 20 iv x 1 again today, pt approaching euvolemia  - plan for coronary angiogram/ RHC and cardiac MRI tomorrow  - prior to discharge will likely start low dose BB and place lifevest. Would benefit from espinoza outpatient as well.    # 50 lb weight loss  - pt states he was under a lot of stress and eating very little calories per day, but still concerning for occult infection or malignancy, basic workup unrevealing so far  - age appropriate guideline directed cancer screening  - nutrition consult    # Tobacco dependence  - NRT    # Ppx  - SQH    # Dispo  - pending stabilization, angiogram/RHC, MRI; anticipate at least 2 more days in hospital    Pt will be discussed with attending on rounds.    Vasquez Vale  Cardiology Fellow  [phone]    I have seen, interviewed, and examined patient. I have reviewed the laboratory tests, imaging, and other investigations. I have reviewed the management plan with the patient. I discussed with the team and agree with the findings and plan in this resident/fellow/nurse practitioner's note. In addition, changes in the physical examination, assessment and plan have been incorporated into the note by myself, as to make it a single cohesive document.       Brynn Moy MD, MS  Cardiology/Cardiac EP Attending Staff

## 2017-01-10 NOTE — PROGRESS NOTES
D: New Dx Heart Failure. SOB. Pneumonia.     I: 2LNC. Nebsx2. Provided emotional support relating to hospital stay and diagnosis.    A: A/Ox4. Denies pain. Sinus Rhythm 90s. Lungs, crackles in bases. Good urine output. Bowel sounds present. No edema. Up Independently. Rt PIV.   Temp:  [98  F (36.7  C)-98.3  F (36.8  C)] 98.1  F (36.7  C)  Pulse:  [107] 107  Heart Rate:  [] 89  Resp:  [7-26] 24  BP: ()/(75-97) 112/81 mmHg  SpO2:  [88 %-100 %] 98 %    P: Continue to monitor and assess patient with report of concerns to Cards 1 team.     Myah Devine RN 01/10/2017 7:59 AM    Hours of Care [phone]

## 2017-01-10 NOTE — PROGRESS NOTES
CLINICAL NUTRITION SERVICES - ASSESSMENT NOTE     Nutrition Prescription    RECOMMENDATIONS FOR MDs/PROVIDERS TO ORDER:  None at this time.    Malnutrition Status:    Severe malnutrition in the context of chronic illness    Recommendations already ordered by Registered Dietitian (RD):  1. Modified diet order  2. Oral supplements  3. Thiamine  4. Multivitamin with minerals    Future/Additional Recommendations:  1. Continue 3 g sodium diet as per change this afternoon. Fluid restriction per team.  2. Monitor lytes (Phos, Mg++, and K+) for refeeding syndrome.  If lytes trend low, aggressively replace.       REASON FOR ASSESSMENT  Akshat Fragoso is a/an 51 year old male assessed by the dietitian for Provider Order - New CHF, significant weight loss/ cachexia. Also received nutrition consult for 2 g sodium diet. Admission nutrition risk screen pending.    NUTRITION HISTORY  Per H & P, SOB with activity for five years, worse over the past month. No N/V.   Per discussion with pt and wife (Cheryl), pt has lost and gained some wt over the years with an overall downtrend in weight.  Pt states he has been dealing with quite a bit of stress, especially with his dad over the past two years, and pt's wife states that pt gets anxious. During these stressful times, pt may not eat and just drink caffeine. Pt noticed that he has gone down four to five pant sizes. Denies N/V, diarrhea, constipation, and abdominal pain but admits to having early satiety at times. While he previously ate three meals per day he now eats two snacks and then a larger dinner. Pt states he cooks for his family and does quite of bit of his cooking from scratch. Has not received formal low-sodium nutrition education in the past.     CURRENT NUTRITION ORDERS  Diet: 2 g Sodium and 2000 mL Fluid Restriction  Intake/Tolerance: Per chart, pt consumed 100% of meals with a good appetite 1/10.    LABS  Labs reviewed    MEDICATIONS  Medications  "reviewed    ANTHROPOMETRICS  Height: 170.2 cm (5' 7\")  Most Recent Weight: 68.901 kg (151 lb 14.4 oz)    IBW: 67.3 kg   BMI: Normal BMI  Weight History: Per chart review, pt weighed 72.2 kg (12/17/15), 71.1 kg (2/8/16), 69.4 kg (3/25/16), 71.2 kg (4/25/16) - He has lost 3.2% of his body wt over the past nine months. Note, pt lost wt previously, 2014 through the first half of 2015 especially: 92.1 kg (1/9/14), 74.8 kg (8/26/15).   Dosing Weight: 69 kg (based on lowest wt this admission of 68.9 kg on 1/10)    ASSESSED NUTRITION NEEDS  Estimated Energy Needs: 0139-1140 kcals/day (30 - 35 kcals/kg)  Justification: Increased needs with cardiac status  Estimated Protein Needs: 76-97 grams protein/day (1.1 - 1.4 grams of pro/kg)  Justification: Increased needs with cardiac status  Estimated Fluid Needs: 2000 mL/day    Justification: On a fluid restriction    PHYSICAL FINDINGS  See malnutrition section below.    MALNUTRITION  % Intake: < 75% for > 7 days (non-severe)  % Weight Loss: Weight loss does not meet criteria  Subcutaneous Fat Loss: Facial region:  Moderate-Severe, Upper arm:  Mild-moderate  Muscle Loss: Temporal:  Moderate-Severe and Thoracic region (clavicle, acromium bone, deltoid, trapezius, pectoral):  Moderate-Severe    Fluid Accumulation/Edema: Does not meet criteria  Malnutrition Diagnosis: Severe malnutrition in the context of chronic illness    NUTRITION DIAGNOSIS  Inadequate oral intake related to early satiety, stress as evidenced by pt report of eating smaller amounts of foods, severe malnutrition, and has lost four to five pant sizes.    INTERVENTIONS  Implementation  1.  Nutrition education for nutrition relationship to health/disease: Written low-sodium and fluid restriction education provided to pt and wife (Cheryl): How to Read Nutrition Labels, Low-Sodium Foods and Drinks, Tips for a Low-Sodium Diet, Managing Your Fluid Intake, and Seasoning Your Foods Without Adding Salt.  Encouraged oral " intake at meals. Also, rec increase protein intake. Discussed high protein sources. Small, frequent meals as able.   2.  Collaboration with other providers: Discussed pt with team. Per team, due to pt's weight loss, it is acceptable if he follows a 3 g sodium diet at this time.  Changed diet to 3 g sodium/day.  3.  Medical food supplement therapy: Oral supplement menu provided. Discussed supplements with pt and ordered chocolate Ensure Plus at HS.   4.  Multivitamin/mineral supplement therapy: Ordered multivitamin with minerals to ensure micronutrient needs are met. Also, ordered 100 mg thiamine daily due to cardiac status.    Goals  Patient to consume % of nutritionally adequate meal trays TID, or the equivalent with supplements/snacks.    Monitoring/Evaluation  Progress toward goals will be monitored and evaluated per protocol.     Nutrition will continue to follow.    Joann Billings, MS, RD, LD, [facility]   6C Pgr:  [phone]

## 2017-01-10 NOTE — PLAN OF CARE
Focus:  Admission  D: Diagnosis: Admitted from: [facility], [place] via: wheelchair and accompanied by: family. Arrived approximately 1800 to 6C.  I: Belongings: Placed in closet; valuables sent home with family. Admission paperwork: pending. Teaching: Call don't fall, use of console, meal times, visiting hours, orientation to unit, when to call for the RN (angina/sob/dizzyness, etc.), and stressed the importance of safety. Access: R PIV Telemetry:Placed on pt Ht./Wt.: complete  A: A&Ox3, Skin intact (see stanislav score). Up with SBA. Diet: Cardiac Diet, FR 2L :appetite fair. Rib pain managed with Percacet. Patient reported stomach reaction to Aspirin, MD notified and instructed to hold tonight's dose. Placed on 2L O2, O2 sats 95%. Other vital signs stable.   P: Administer medications per MD order, follow plan of care, and notify MD as necessary with questions/concerns.  Nita Dhillon RN

## 2017-01-10 NOTE — PROGRESS NOTES
01/10/17 1123   Quick Adds   Type of Visit Initial Occupational Therapy Evaluation   Living Environment   Lives With spouse;child(george), dependent   Living Arrangements house   Home Accessibility stairs to enter home;tub/shower is not walk in;grab bars present (bathtub)   Number of Stairs to Enter Home 11   Number of Stairs Within Home 12  (does not have to use stairs)   Transportation Available car   Living Environment Comment (Wife works full-time; no help during day)   Self-Care   Dominant Hand right   Usual Activity Tolerance good   Current Activity Tolerance poor   Regular Exercise no  (active job)   Equipment Currently Used at Home grab bar;walker, rolling   Functional Level Prior   Ambulation 0-->independent   Transferring 0-->independent   Toileting 0-->independent   Bathing 0-->independent   Dressing 0-->independent   Eating 0-->independent   Communication 0-->understands/communicates without difficulty   Swallowing 0-->swallows foods/liquids without difficulty   Cognition 0 - no cognition issues reported   Fall history within last six months no   General Information   Onset of Illness/Injury or Date of Surgery - Date 01/09/17   Referring Physician Alicia Fernandes MD   Patient/Family Goals Statement To return home Ind   Additional Occupational Profile Info/Pertinent History of Current Problem 51M PMHx tobacco use admitted with 50 lb weight loss and new dilated CM (EF 13%).   Precautions/Limitations (heart failure)   Cognitive Status Examination   Orientation orientation to person, place and time   Level of Consciousness alert   Visual Perception   Visual Perception No deficits were identified   Sensory Examination   Sensory Comments pt denies deficits   Pain Assessment   Patient Currently in Pain Yes, see Vital Sign flowsheet   Integumentary/Edema   Integumentary/Edema no deficits were identifed   Range of Motion (ROM)   ROM Comment WNL   Strength   Strength Comments WNL   Muscle Tone Assessment  "  Muscle Tone Comments Normal   Coordination   Upper Extremity Coordination No deficits were identified   Grooming   Level of [place]: Grooming independent   Instrumental Activities of Daily Living (IADL)   Previous Responsibilities (I with all IADLs)   Activities of Daily Living Analysis   Impairments Contributing to Impaired Activities of Daily Living (Deconditioning, activity tolerance)   General Therapy Interventions   Planned Therapy Interventions progressive activity/exercise;risk factor education;home program guidelines   Clinical Impression   Criteria for Skilled Therapeutic Interventions Met yes, treatment indicated   OT Diagnosis Decreased ADL/IADL tolerance   Influenced by the following impairments Deconditioning, activity tolerance   Assessment of Occupational Performance 1-3 Performance Deficits   Identified Performance Deficits Leisure, work   Clinical Decision Making (Complexity) Low complexity   Therapy Frequency 5 times/wk   Predicted Duration of Therapy Intervention (days/wks) 1/17/17   Anticipated Equipment Needs at Discharge (None)   Anticipated Discharge Disposition Home   Risks and Benefits of Treatment have been explained. Yes   Patient, Family & other staff in agreement with plan of care Yes   Clinical Impression Comments Pt would benefit from OT/CR to help increase ADLs due to heart failure   [facility] [employer] TM \"6 Clicks\"   2016, Trustees of [facility], under license to [employer].  All rights reserved.   6 Clicks Short Forms Daily Activity Inpatient Short Form   [facility] AM-PAC  \"6 Clicks\" Daily Activity Inpatient Short Form   1. Putting on and taking off regular lower body clothing? 4 - None   2. Bathing (including washing, rinsing, drying)? 4 - None   3. Toileting, which includes using toilet, bedpan or urinal? 4 - None   4. Putting on and taking off regular upper body clothing? 4 - None   5. Taking care of personal grooming such as brushing teeth? 4 - None "   6. Eating meals? 4 - None   Daily Activity Raw Score (Score out of 24.Lower scores equate to lower levels of function) 24   Total Evaluation Time   Total Evaluation Time (Minutes) 10

## 2017-01-10 NOTE — PLAN OF CARE
Problem: Goal Outcome Summary  Goal: Goal Outcome Summary  OT6C: Anticipate pt will be able to return home with assist from family. Pt is limited by fatigue, deconditioning, pain from coughing, and anxiety from recent death in family. Pt refused any OOB activity today.

## 2017-01-11 ENCOUNTER — APPOINTMENT (OUTPATIENT)
Dept: CARDIOLOGY | Facility: CLINIC | Age: 52
DRG: 286 | End: 2017-01-11

## 2017-01-11 ENCOUNTER — APPOINTMENT (OUTPATIENT)
Dept: MRI IMAGING | Facility: CLINIC | Age: 52
DRG: 286 | End: 2017-01-11

## 2017-01-11 PROBLEM — I42.8 CARDIOMYOPATHY, NONISCHEMIC (H): Status: ACTIVE | Noted: 2017-01-11

## 2017-01-11 LAB
ANION GAP SERPL CALCULATED.3IONS-SCNC: 12 MMOL/L (ref 3–14)
BASOPHILS # BLD AUTO: 0 10E9/L (ref 0–0.2)
BASOPHILS NFR BLD AUTO: 0.2 %
BUN SERPL-MCNC: 16 MG/DL (ref 7–30)
CALCIUM SERPL-MCNC: 8.5 MG/DL (ref 8.5–10.1)
CHLORIDE SERPL-SCNC: 105 MMOL/L (ref 94–109)
CO2 SERPL-SCNC: 21 MMOL/L (ref 20–32)
CREAT SERPL-MCNC: 0.94 MG/DL (ref 0.66–1.25)
DIFFERENTIAL METHOD BLD: ABNORMAL
EOSINOPHIL # BLD AUTO: 0.1 10E9/L (ref 0–0.7)
EOSINOPHIL NFR BLD AUTO: 0.9 %
ERYTHROCYTE [DISTWIDTH] IN BLOOD BY AUTOMATED COUNT: 14 % (ref 10–15)
GFR SERPL CREATININE-BSD FRML MDRD: 85 ML/MIN/1.7M2
GLUCOSE SERPL-MCNC: 90 MG/DL (ref 70–99)
HCT VFR BLD AUTO: 53.1 % (ref 40–53)
HGB BLD-MCNC: 16.6 G/DL (ref 13.3–17.7)
IMM GRANULOCYTES # BLD: 0 10E9/L (ref 0–0.4)
IMM GRANULOCYTES NFR BLD: 0.2 %
INR PPP: 1.09 (ref 0.86–1.14)
LACTATE BLD-SCNC: 1.2 MMOL/L (ref 0.7–2.1)
LYMPHOCYTES # BLD AUTO: 2.8 10E9/L (ref 0.8–5.3)
LYMPHOCYTES NFR BLD AUTO: 23.3 %
MAGNESIUM SERPL-MCNC: 2.1 MG/DL (ref 1.6–2.3)
MCH RBC QN AUTO: 30.9 PG (ref 26.5–33)
MCHC RBC AUTO-ENTMCNC: 31.3 G/DL (ref 31.5–36.5)
MCV RBC AUTO: 99 FL (ref 78–100)
MONOCYTES # BLD AUTO: 0.8 10E9/L (ref 0–1.3)
MONOCYTES NFR BLD AUTO: 6.9 %
NEUTROPHILS # BLD AUTO: 8.3 10E9/L (ref 1.6–8.3)
NEUTROPHILS NFR BLD AUTO: 68.5 %
NRBC # BLD AUTO: 0 10*3/UL
NRBC BLD AUTO-RTO: 0 /100
PLATELET # BLD AUTO: 193 10E9/L (ref 150–450)
POTASSIUM SERPL-SCNC: 4.2 MMOL/L (ref 3.4–5.3)
RBC # BLD AUTO: 5.37 10E12/L (ref 4.4–5.9)
SODIUM SERPL-SCNC: 138 MMOL/L (ref 133–144)
WBC # BLD AUTO: 12.1 10E9/L (ref 4–11)

## 2017-01-11 PROCEDURE — 80048 BASIC METABOLIC PNL TOTAL CA: CPT | Performed by: STUDENT IN AN ORGANIZED HEALTH CARE EDUCATION/TRAINING PROGRAM

## 2017-01-11 PROCEDURE — 21400006 ZZH R&B CCU INTERMEDIATE UMMC

## 2017-01-11 PROCEDURE — 99152 MOD SED SAME PHYS/QHP 5/>YRS: CPT

## 2017-01-11 PROCEDURE — 93456 R HRT CORONARY ARTERY ANGIO: CPT

## 2017-01-11 PROCEDURE — 99232 SBSQ HOSP IP/OBS MODERATE 35: CPT | Mod: 25 | Performed by: INTERNAL MEDICINE

## 2017-01-11 PROCEDURE — 25000125 ZZHC RX 250: Performed by: STUDENT IN AN ORGANIZED HEALTH CARE EDUCATION/TRAINING PROGRAM

## 2017-01-11 PROCEDURE — A9585 GADOBUTROL INJECTION: HCPCS | Performed by: INTERNAL MEDICINE

## 2017-01-11 PROCEDURE — 83735 ASSAY OF MAGNESIUM: CPT | Performed by: STUDENT IN AN ORGANIZED HEALTH CARE EDUCATION/TRAINING PROGRAM

## 2017-01-11 PROCEDURE — 25000125 ZZHC RX 250: Performed by: INTERNAL MEDICINE

## 2017-01-11 PROCEDURE — 86480 TB TEST CELL IMMUN MEASURE: CPT | Performed by: INTERNAL MEDICINE

## 2017-01-11 PROCEDURE — 25500045 ZZH RX 255: Performed by: INTERNAL MEDICINE

## 2017-01-11 PROCEDURE — C1894 INTRO/SHEATH, NON-LASER: HCPCS

## 2017-01-11 PROCEDURE — 27210742 ZZH CATH CR1

## 2017-01-11 PROCEDURE — 40000556 ZZH STATISTIC PERIPHERAL IV START W US GUIDANCE

## 2017-01-11 PROCEDURE — 85610 PROTHROMBIN TIME: CPT | Performed by: STUDENT IN AN ORGANIZED HEALTH CARE EDUCATION/TRAINING PROGRAM

## 2017-01-11 PROCEDURE — 36415 COLL VENOUS BLD VENIPUNCTURE: CPT | Performed by: STUDENT IN AN ORGANIZED HEALTH CARE EDUCATION/TRAINING PROGRAM

## 2017-01-11 PROCEDURE — 27210787 ZZH MANIFOLD CR2

## 2017-01-11 PROCEDURE — 27211089 ZZH KIT ACIST INJECTOR CR3

## 2017-01-11 PROCEDURE — 25000308 HC RX OP HPI UCR WEL MED 250 IP 250: Performed by: STUDENT IN AN ORGANIZED HEALTH CARE EDUCATION/TRAINING PROGRAM

## 2017-01-11 PROCEDURE — 85025 COMPLETE CBC W/AUTO DIFF WBC: CPT | Performed by: STUDENT IN AN ORGANIZED HEALTH CARE EDUCATION/TRAINING PROGRAM

## 2017-01-11 PROCEDURE — 94640 AIRWAY INHALATION TREATMENT: CPT

## 2017-01-11 PROCEDURE — B2111ZZ FLUOROSCOPY OF MULTIPLE CORONARY ARTERIES USING LOW OSMOLAR CONTRAST: ICD-10-PCS | Performed by: INTERNAL MEDICINE

## 2017-01-11 PROCEDURE — 40000275 ZZH STATISTIC RCP TIME EA 10 MIN

## 2017-01-11 PROCEDURE — 83605 ASSAY OF LACTIC ACID: CPT | Performed by: INTERNAL MEDICINE

## 2017-01-11 PROCEDURE — 93456 R HRT CORONARY ARTERY ANGIO: CPT | Mod: 26 | Performed by: INTERNAL MEDICINE

## 2017-01-11 PROCEDURE — 27210946 ZZH KIT HC TOTES DISP CR8

## 2017-01-11 PROCEDURE — 25000132 ZZH RX MED GY IP 250 OP 250 PS 637: Performed by: STUDENT IN AN ORGANIZED HEALTH CARE EDUCATION/TRAINING PROGRAM

## 2017-01-11 PROCEDURE — 4A023N6 MEASUREMENT OF CARDIAC SAMPLING AND PRESSURE, RIGHT HEART, PERCUTANEOUS APPROACH: ICD-10-PCS | Performed by: INTERNAL MEDICINE

## 2017-01-11 PROCEDURE — 99153 MOD SED SAME PHYS/QHP EA: CPT

## 2017-01-11 PROCEDURE — 75561 CARDIAC MRI FOR MORPH W/DYE: CPT

## 2017-01-11 PROCEDURE — 36415 COLL VENOUS BLD VENIPUNCTURE: CPT | Performed by: INTERNAL MEDICINE

## 2017-01-11 PROCEDURE — 75561 CARDIAC MRI FOR MORPH W/DYE: CPT | Mod: 26 | Performed by: INTERNAL MEDICINE

## 2017-01-11 PROCEDURE — 25000132 ZZH RX MED GY IP 250 OP 250 PS 637: Performed by: INTERNAL MEDICINE

## 2017-01-11 PROCEDURE — 27211181 ZZH BALLOON TIP PRESSURE CR5

## 2017-01-11 RX ORDER — FENTANYL CITRATE 50 UG/ML
25-50 INJECTION, SOLUTION INTRAMUSCULAR; INTRAVENOUS
Status: DISCONTINUED | OUTPATIENT
Start: 2017-01-11 | End: 2017-01-11 | Stop reason: HOSPADM

## 2017-01-11 RX ORDER — METHYLPREDNISOLONE SODIUM SUCCINATE 125 MG/2ML
125 INJECTION, POWDER, LYOPHILIZED, FOR SOLUTION INTRAMUSCULAR; INTRAVENOUS
Status: DISCONTINUED | OUTPATIENT
Start: 2017-01-11 | End: 2017-01-11 | Stop reason: HOSPADM

## 2017-01-11 RX ORDER — IOPAMIDOL 755 MG/ML
46 INJECTION, SOLUTION INTRAVASCULAR ONCE
Status: COMPLETED | OUTPATIENT
Start: 2017-01-11 | End: 2017-01-11

## 2017-01-11 RX ORDER — NALOXONE HYDROCHLORIDE 0.4 MG/ML
.2-.4 INJECTION, SOLUTION INTRAMUSCULAR; INTRAVENOUS; SUBCUTANEOUS
Status: ACTIVE | OUTPATIENT
Start: 2017-01-11 | End: 2017-01-11

## 2017-01-11 RX ORDER — PHENYLEPHRINE HCL IN 0.9% NACL 1 MG/10 ML
20-100 SYRINGE (ML) INTRAVENOUS
Status: DISCONTINUED | OUTPATIENT
Start: 2017-01-11 | End: 2017-01-11 | Stop reason: HOSPADM

## 2017-01-11 RX ORDER — FLUMAZENIL 0.1 MG/ML
0.2 INJECTION, SOLUTION INTRAVENOUS
Status: DISCONTINUED | OUTPATIENT
Start: 2017-01-11 | End: 2017-01-11 | Stop reason: HOSPADM

## 2017-01-11 RX ORDER — PRASUGREL 10 MG/1
10-60 TABLET, FILM COATED ORAL
Status: DISCONTINUED | OUTPATIENT
Start: 2017-01-11 | End: 2017-01-11 | Stop reason: HOSPADM

## 2017-01-11 RX ORDER — CLOPIDOGREL BISULFATE 75 MG/1
300-600 TABLET ORAL
Status: DISCONTINUED | OUTPATIENT
Start: 2017-01-11 | End: 2017-01-11 | Stop reason: HOSPADM

## 2017-01-11 RX ORDER — PROTAMINE SULFATE 10 MG/ML
1-5 INJECTION, SOLUTION INTRAVENOUS
Status: DISCONTINUED | OUTPATIENT
Start: 2017-01-11 | End: 2017-01-11 | Stop reason: HOSPADM

## 2017-01-11 RX ORDER — ADENOSINE 3 MG/ML
12-12000 INJECTION, SOLUTION INTRAVENOUS
Status: DISCONTINUED | OUTPATIENT
Start: 2017-01-11 | End: 2017-01-11 | Stop reason: HOSPADM

## 2017-01-11 RX ORDER — ASPIRIN 325 MG
325 TABLET ORAL
Status: DISCONTINUED | OUTPATIENT
Start: 2017-01-11 | End: 2017-01-11 | Stop reason: HOSPADM

## 2017-01-11 RX ORDER — DEXTROSE MONOHYDRATE 25 G/50ML
12.5-5 INJECTION, SOLUTION INTRAVENOUS EVERY 30 MIN PRN
Status: DISCONTINUED | OUTPATIENT
Start: 2017-01-11 | End: 2017-01-11 | Stop reason: HOSPADM

## 2017-01-11 RX ORDER — ARGATROBAN 1 MG/ML
150 INJECTION, SOLUTION INTRAVENOUS
Status: DISCONTINUED | OUTPATIENT
Start: 2017-01-11 | End: 2017-01-11 | Stop reason: HOSPADM

## 2017-01-11 RX ORDER — LORAZEPAM 2 MG/ML
.5-2 INJECTION INTRAMUSCULAR EVERY 4 HOURS PRN
Status: DISCONTINUED | OUTPATIENT
Start: 2017-01-11 | End: 2017-01-11 | Stop reason: HOSPADM

## 2017-01-11 RX ORDER — CLOPIDOGREL BISULFATE 75 MG/1
75 TABLET ORAL
Status: DISCONTINUED | OUTPATIENT
Start: 2017-01-11 | End: 2017-01-11 | Stop reason: HOSPADM

## 2017-01-11 RX ORDER — NITROGLYCERIN 5 MG/ML
100-200 VIAL (ML) INTRAVENOUS
Status: DISCONTINUED | OUTPATIENT
Start: 2017-01-11 | End: 2017-01-11 | Stop reason: HOSPADM

## 2017-01-11 RX ORDER — SODIUM NITROPRUSSIDE 25 MG/ML
100-200 INJECTION INTRAVENOUS
Status: DISCONTINUED | OUTPATIENT
Start: 2017-01-11 | End: 2017-01-11 | Stop reason: HOSPADM

## 2017-01-11 RX ORDER — FLUMAZENIL 0.1 MG/ML
0.2 INJECTION, SOLUTION INTRAVENOUS
Status: DISCONTINUED | OUTPATIENT
Start: 2017-01-11 | End: 2017-01-11

## 2017-01-11 RX ORDER — FLUMAZENIL 0.1 MG/ML
0.2 INJECTION, SOLUTION INTRAVENOUS
Status: ACTIVE | OUTPATIENT
Start: 2017-01-11 | End: 2017-01-11

## 2017-01-11 RX ORDER — NITROGLYCERIN 0.4 MG/1
0.4 TABLET SUBLINGUAL EVERY 5 MIN PRN
Status: DISCONTINUED | OUTPATIENT
Start: 2017-01-11 | End: 2017-01-11 | Stop reason: HOSPADM

## 2017-01-11 RX ORDER — NIFEDIPINE 10 MG/1
10 CAPSULE ORAL
Status: DISCONTINUED | OUTPATIENT
Start: 2017-01-11 | End: 2017-01-11 | Stop reason: HOSPADM

## 2017-01-11 RX ORDER — DOPAMINE HYDROCHLORIDE 160 MG/100ML
2-20 INJECTION, SOLUTION INTRAVENOUS CONTINUOUS PRN
Status: DISCONTINUED | OUTPATIENT
Start: 2017-01-11 | End: 2017-01-11 | Stop reason: HOSPADM

## 2017-01-11 RX ORDER — PROMETHAZINE HYDROCHLORIDE 25 MG/ML
6.25-25 INJECTION, SOLUTION INTRAMUSCULAR; INTRAVENOUS EVERY 4 HOURS PRN
Status: DISCONTINUED | OUTPATIENT
Start: 2017-01-11 | End: 2017-01-11 | Stop reason: HOSPADM

## 2017-01-11 RX ORDER — DIPHENHYDRAMINE HYDROCHLORIDE 50 MG/ML
25-50 INJECTION INTRAMUSCULAR; INTRAVENOUS
Status: DISCONTINUED | OUTPATIENT
Start: 2017-01-11 | End: 2017-01-11 | Stop reason: HOSPADM

## 2017-01-11 RX ORDER — VERAPAMIL HYDROCHLORIDE 2.5 MG/ML
1-2.5 INJECTION, SOLUTION INTRAVENOUS
Status: DISCONTINUED | OUTPATIENT
Start: 2017-01-11 | End: 2017-01-11 | Stop reason: HOSPADM

## 2017-01-11 RX ORDER — EPTIFIBATIDE 2 MG/ML
2 INJECTION, SOLUTION INTRAVENOUS CONTINUOUS PRN
Status: DISCONTINUED | OUTPATIENT
Start: 2017-01-11 | End: 2017-01-11 | Stop reason: HOSPADM

## 2017-01-11 RX ORDER — FENTANYL CITRATE 50 UG/ML
25-50 INJECTION, SOLUTION INTRAMUSCULAR; INTRAVENOUS
Status: DISCONTINUED | OUTPATIENT
Start: 2017-01-11 | End: 2017-01-11

## 2017-01-11 RX ORDER — IOPAMIDOL 755 MG/ML
200 INJECTION, SOLUTION INTRAVASCULAR ONCE
Status: DISCONTINUED | OUTPATIENT
Start: 2017-01-11 | End: 2017-01-12

## 2017-01-11 RX ORDER — POTASSIUM CHLORIDE 29.8 MG/ML
20 INJECTION INTRAVENOUS
Status: DISCONTINUED | OUTPATIENT
Start: 2017-01-11 | End: 2017-01-11 | Stop reason: HOSPADM

## 2017-01-11 RX ORDER — EPTIFIBATIDE 2 MG/ML
180 INJECTION, SOLUTION INTRAVENOUS EVERY 10 MIN PRN
Status: DISCONTINUED | OUTPATIENT
Start: 2017-01-11 | End: 2017-01-11 | Stop reason: HOSPADM

## 2017-01-11 RX ORDER — POTASSIUM CHLORIDE 7.45 MG/ML
10 INJECTION INTRAVENOUS
Status: DISCONTINUED | OUTPATIENT
Start: 2017-01-11 | End: 2017-01-11 | Stop reason: HOSPADM

## 2017-01-11 RX ORDER — NALOXONE HYDROCHLORIDE 0.4 MG/ML
.2-.4 INJECTION, SOLUTION INTRAMUSCULAR; INTRAVENOUS; SUBCUTANEOUS
Status: DISCONTINUED | OUTPATIENT
Start: 2017-01-11 | End: 2017-01-11

## 2017-01-11 RX ORDER — NITROGLYCERIN 20 MG/100ML
.07-2 INJECTION INTRAVENOUS CONTINUOUS PRN
Status: DISCONTINUED | OUTPATIENT
Start: 2017-01-11 | End: 2017-01-11 | Stop reason: HOSPADM

## 2017-01-11 RX ORDER — ENALAPRILAT 1.25 MG/ML
1.25-2.5 INJECTION INTRAVENOUS
Status: DISCONTINUED | OUTPATIENT
Start: 2017-01-11 | End: 2017-01-11 | Stop reason: HOSPADM

## 2017-01-11 RX ORDER — LIDOCAINE HYDROCHLORIDE 10 MG/ML
30 INJECTION, SOLUTION EPIDURAL; INFILTRATION; INTRACAUDAL; PERINEURAL
Status: DISCONTINUED | OUTPATIENT
Start: 2017-01-11 | End: 2017-01-11 | Stop reason: HOSPADM

## 2017-01-11 RX ORDER — FENTANYL CITRATE 50 UG/ML
25-50 INJECTION, SOLUTION INTRAMUSCULAR; INTRAVENOUS
Status: ACTIVE | OUTPATIENT
Start: 2017-01-11 | End: 2017-01-11

## 2017-01-11 RX ORDER — ONDANSETRON 2 MG/ML
4 INJECTION INTRAMUSCULAR; INTRAVENOUS EVERY 4 HOURS PRN
Status: DISCONTINUED | OUTPATIENT
Start: 2017-01-11 | End: 2017-01-11 | Stop reason: HOSPADM

## 2017-01-11 RX ORDER — NALOXONE HYDROCHLORIDE 0.4 MG/ML
0.4 INJECTION, SOLUTION INTRAMUSCULAR; INTRAVENOUS; SUBCUTANEOUS EVERY 5 MIN PRN
Status: DISCONTINUED | OUTPATIENT
Start: 2017-01-11 | End: 2017-01-11 | Stop reason: HOSPADM

## 2017-01-11 RX ORDER — HYDRALAZINE HYDROCHLORIDE 20 MG/ML
10-20 INJECTION INTRAMUSCULAR; INTRAVENOUS
Status: DISCONTINUED | OUTPATIENT
Start: 2017-01-11 | End: 2017-01-11 | Stop reason: HOSPADM

## 2017-01-11 RX ORDER — DOBUTAMINE HYDROCHLORIDE 200 MG/100ML
2-20 INJECTION INTRAVENOUS CONTINUOUS PRN
Status: DISCONTINUED | OUTPATIENT
Start: 2017-01-11 | End: 2017-01-11 | Stop reason: HOSPADM

## 2017-01-11 RX ORDER — LIDOCAINE 40 MG/G
CREAM TOPICAL
Status: DISCONTINUED | OUTPATIENT
Start: 2017-01-11 | End: 2017-01-11

## 2017-01-11 RX ORDER — NICARDIPINE HYDROCHLORIDE 2.5 MG/ML
100 INJECTION INTRAVENOUS
Status: DISCONTINUED | OUTPATIENT
Start: 2017-01-11 | End: 2017-01-11 | Stop reason: HOSPADM

## 2017-01-11 RX ORDER — GADOBUTROL 604.72 MG/ML
7.5 INJECTION INTRAVENOUS ONCE
Status: COMPLETED | OUTPATIENT
Start: 2017-01-11 | End: 2017-01-11

## 2017-01-11 RX ORDER — LIDOCAINE 40 MG/G
CREAM TOPICAL
Status: DISCONTINUED | OUTPATIENT
Start: 2017-01-11 | End: 2017-01-12 | Stop reason: HOSPADM

## 2017-01-11 RX ORDER — FUROSEMIDE 10 MG/ML
20-100 INJECTION INTRAMUSCULAR; INTRAVENOUS
Status: DISCONTINUED | OUTPATIENT
Start: 2017-01-11 | End: 2017-01-11 | Stop reason: HOSPADM

## 2017-01-11 RX ORDER — FUROSEMIDE 10 MG/ML
20 INJECTION INTRAMUSCULAR; INTRAVENOUS ONCE
Status: COMPLETED | OUTPATIENT
Start: 2017-01-11 | End: 2017-01-11

## 2017-01-11 RX ORDER — PROTAMINE SULFATE 10 MG/ML
25-100 INJECTION, SOLUTION INTRAVENOUS EVERY 5 MIN PRN
Status: DISCONTINUED | OUTPATIENT
Start: 2017-01-11 | End: 2017-01-11 | Stop reason: HOSPADM

## 2017-01-11 RX ORDER — NALOXONE HYDROCHLORIDE 0.4 MG/ML
.1-.4 INJECTION, SOLUTION INTRAMUSCULAR; INTRAVENOUS; SUBCUTANEOUS
Status: DISCONTINUED | OUTPATIENT
Start: 2017-01-11 | End: 2017-01-11

## 2017-01-11 RX ORDER — NITROGLYCERIN 5 MG/ML
100-500 VIAL (ML) INTRAVENOUS
Status: DISCONTINUED | OUTPATIENT
Start: 2017-01-11 | End: 2017-01-11 | Stop reason: HOSPADM

## 2017-01-11 RX ORDER — ARGATROBAN 1 MG/ML
350 INJECTION, SOLUTION INTRAVENOUS
Status: DISCONTINUED | OUTPATIENT
Start: 2017-01-11 | End: 2017-01-11 | Stop reason: HOSPADM

## 2017-01-11 RX ORDER — ASPIRIN 81 MG/1
81-324 TABLET, CHEWABLE ORAL
Status: DISCONTINUED | OUTPATIENT
Start: 2017-01-11 | End: 2017-01-11 | Stop reason: HOSPADM

## 2017-01-11 RX ORDER — NALOXONE HYDROCHLORIDE 0.4 MG/ML
.1-.4 INJECTION, SOLUTION INTRAMUSCULAR; INTRAVENOUS; SUBCUTANEOUS
Status: DISCONTINUED | OUTPATIENT
Start: 2017-01-11 | End: 2017-01-12 | Stop reason: HOSPADM

## 2017-01-11 RX ORDER — HEPARIN SODIUM 1000 [USP'U]/ML
1000-10000 INJECTION, SOLUTION INTRAVENOUS; SUBCUTANEOUS EVERY 5 MIN PRN
Status: DISCONTINUED | OUTPATIENT
Start: 2017-01-11 | End: 2017-01-11 | Stop reason: HOSPADM

## 2017-01-11 RX ADMIN — FENTANYL CITRATE 25 MCG: 50 INJECTION, SOLUTION INTRAMUSCULAR; INTRAVENOUS at 09:02

## 2017-01-11 RX ADMIN — ALBUTEROL SULFATE 2.5 MG: 2.5 SOLUTION RESPIRATORY (INHALATION) at 07:43

## 2017-01-11 RX ADMIN — MIDAZOLAM HYDROCHLORIDE 1 MG: 1 INJECTION, SOLUTION INTRAMUSCULAR; INTRAVENOUS at 09:10

## 2017-01-11 RX ADMIN — MULTIPLE VITAMINS W/ MINERALS TAB 1 TABLET: TAB at 10:35

## 2017-01-11 RX ADMIN — FUROSEMIDE 20 MG: 10 INJECTION, SOLUTION INTRAVENOUS at 15:13

## 2017-01-11 RX ADMIN — NICOTINE 1 PATCH: 21 PATCH TRANSDERMAL at 22:27

## 2017-01-11 RX ADMIN — OXYCODONE HYDROCHLORIDE AND ACETAMINOPHEN 1 TABLET: 5; 325 TABLET ORAL at 19:38

## 2017-01-11 RX ADMIN — HEPARIN SODIUM 5000 UNITS: 5000 INJECTION, SOLUTION INTRAVENOUS; SUBCUTANEOUS at 06:24

## 2017-01-11 RX ADMIN — IOPAMIDOL 46 ML: 755 INJECTION, SOLUTION INTRAVASCULAR at 09:39

## 2017-01-11 RX ADMIN — FENTANYL CITRATE 50 MCG: 50 INJECTION, SOLUTION INTRAMUSCULAR; INTRAVENOUS at 09:19

## 2017-01-11 RX ADMIN — FENTANYL CITRATE 50 MCG: 50 INJECTION, SOLUTION INTRAMUSCULAR; INTRAVENOUS at 08:47

## 2017-01-11 RX ADMIN — FENTANYL CITRATE 25 MCG: 50 INJECTION, SOLUTION INTRAMUSCULAR; INTRAVENOUS at 09:05

## 2017-01-11 RX ADMIN — OXYCODONE HYDROCHLORIDE AND ACETAMINOPHEN 1 TABLET: 5; 325 TABLET ORAL at 10:34

## 2017-01-11 RX ADMIN — ASPIRIN 81 MG: 81 TABLET, COATED ORAL at 12:51

## 2017-01-11 RX ADMIN — HEPARIN SODIUM 5000 UNITS: 5000 INJECTION, SOLUTION INTRAVENOUS; SUBCUTANEOUS at 18:03

## 2017-01-11 RX ADMIN — OXYCODONE HYDROCHLORIDE AND ACETAMINOPHEN 1 TABLET: 5; 325 TABLET ORAL at 05:00

## 2017-01-11 RX ADMIN — MIDAZOLAM HYDROCHLORIDE 1 MG: 1 INJECTION, SOLUTION INTRAMUSCULAR; INTRAVENOUS at 09:33

## 2017-01-11 RX ADMIN — FENTANYL CITRATE 50 MCG: 50 INJECTION, SOLUTION INTRAMUSCULAR; INTRAVENOUS at 09:41

## 2017-01-11 RX ADMIN — MIDAZOLAM HYDROCHLORIDE 1 MG: 1 INJECTION, SOLUTION INTRAMUSCULAR; INTRAVENOUS at 09:06

## 2017-01-11 RX ADMIN — Medication 100 MG: at 10:35

## 2017-01-11 RX ADMIN — GADOBUTROL 7.5 ML: 604.72 INJECTION INTRAVENOUS at 14:50

## 2017-01-11 RX ADMIN — Medication 12.5 MG: at 15:12

## 2017-01-11 RX ADMIN — MIDAZOLAM HYDROCHLORIDE 1 MG: 1 INJECTION, SOLUTION INTRAMUSCULAR; INTRAVENOUS at 08:58

## 2017-01-11 RX ADMIN — LISINOPRIL 5 MG: 5 TABLET ORAL at 12:51

## 2017-01-11 ASSESSMENT — PAIN DESCRIPTION - DESCRIPTORS
DESCRIPTORS: ACHING
DESCRIPTORS: ACHING

## 2017-01-11 NOTE — PROGRESS NOTES
"Cards 1 Progress Note    CC: New CM    Interval: s/p coronary angiogram and RHC earlier today, which showed clean coronaries, normal cardiac index, elevated PCWP, and only mildly elevated RA pressure. Plan for cardiac MRI later today.       Telemetry: Runs of NSVT noted overngiht    ROS: complete ROS per hpi otherwise negative    In 1080  UOP 1665  Net negative 585    160->158->151 -> 148 lbs    Current meds  Lisinopriol 5  Lasix 20 iv  SQH  Nicotine TD  Asa 81    /77 mmHg  Pulse 107  Temp(Src) 97.9  F (36.6  C) (Oral)  Resp 18  Ht 1.702 m (5' 7\")  Wt 67.314 kg (148 lb 6.4 oz)  BMI 23.24 kg/m2  SpO2 93%  Comf, NAD, cachectic  JVP about 8 cm  Lungs rales present in bases b/l, otherwise good a/e  S1s2+s3  Trace LE edema, warm and well perfused    Labs pending today  Cr 0.9->?        NTPBNP 11K  TSH wnl  LDL 64  HDL 39  Ferritin 90  Lactate 1.4  Trop negative    Echo: Severe left ventricular dilation is present (LVIDd 7.2). Severe diffuse hypokinesis is  present. The Ejection Fraction is estimated at 10-15%. Traced at 13%.  Mild right ventricular dilation is present. Global right ventricular function  is mildly reduced.  Left ventricular diastolic function is indeterminate.  Dilation of the inferior vena cava is present with abnormal respiratory  variation in diameter.    CXR: Impression:  Pulmonary venous congestion with increased interstitial  markings consistent with mild pulmonary edema.    Coronary angiogram (1/11/17):  1. Both coronary arteries arise from their respective cusps.  2. Left-dominant.  3. LM is without angiographic evidence of disease.    4. LAD supplies the entire apex (type 3) and gives rise to septal perforators, D1 and D2.  The mLAD has mild luminal irregularities.    5. LCX gives rise to large OM1 and small OM2 along with trivial distal OM vessels before supplying LPDA and LPL branches.  There is no angiographic evidence of disease.    6. RCA is nondominant. The pRCA has a <25% " stenosis.      RHC:  RA 8  PCWP 23  Carlos CI 2.7, TD 3.6  SVR 1266    CT Chest (1/10/17), non contrast: mildly prominent mediastinal LAD and multiple bilateral subcentimeter lymph nodes, otherwise no major findings in chest and abd      A/P: 51M PMHx tobacco use admitted with 50 lb weight loss and new dilated NICM (EF 13%).    # NICM (EF 13%), clean coronaries, etiology unclear, dilation (7.2 cm) implies chronicity, pt denies drug/etoh use, no signs of cardiogenic shock, CI about 3  - Approaching euvolemia on exam, only mildly elevated right heart pressures will limited ability to decrease PCWP with diuresis, will give additional lasix 20 IV today  - Afterload reduction adequate: cont lisinopril 5  - Add low dose toprol xl  - plan for cardiac MRI later today  - pt will benefit from Zoll Lifevest placement on discharge for primary prevention of SCD for 3 months, at which point it will be determined if he is a candidate for ICD.  - would benefit from adding spironolactone as outpatient as well    # 50 lb weight loss; pt states he was under a lot of stress and eating very little calories per day, but still concerning for occult infection or malignancy, basic workup unrevealing so far  - Discussed CT Chest findings with Pulmonary fellow, who will review images and get back to us with recommendations ?re: serial imaging vs biopsy though less likely since lesions are so small  - appreciate nutrition consult    # Tobacco dependence  - NRT, smoking cessation counseling    # Ppx  - SQH    # Dispo  - pending cardiac MRI and lifevest fitting, likely home tomorrow    [] F/u Dr Moy in 1 month    Pt discussed with attending on rounds.    Vasquez Vale  Cardiology Fellow  [phone]    I have seen, interviewed, and examined patient. I have reviewed the laboratory tests, imaging, and other investigations. I have reviewed the management plan with the patient. I discussed with the team and agree with the findings and plan in this  resident/fellow/nurse practitioner's note. In addition, changes in the physical examination, assessment and plan have been incorporated into the note by myself, as to make it a single cohesive document.       Brynn Moy MD, MS  Cardiology/Cardiac EP Attending Staff

## 2017-01-11 NOTE — PROCEDURES
PRELIMINARY CARDIAC CATH REPORT:     PROCEDURES PERFORMED:   1. Selective left and right coronary angiography.  2.  Right heart catheterization.    PHYSICIANS:  1. Attending Interventional Cardiology Staff: Paras Pang MD  2. Cardiology Fellow: Kilo Emery MD   3. Interventional Cardiology Fellow: Guillermo De La Cruz MD     INDICATION:  Akshat Fragoso is a 51 year old male with no known coronary artery disease. He is admitted with acute decompensated systolic heart failure and has newly diagnosed cardiomyopathy with LVEF 10-15%.    DESCRIPTION:  1. Consent obtained with discussion of risks.  All questions were answered.  2. Sterile prep and procedure.  3. Location: right common femoral artery and right common femoral vein.  4. Access: Local anesthetic with lidocaine.  A standard (18 g) needle with ultrasound guidance was used to establish vascular access using a modified Seldinger technique.  5. Sheath: 4Fr long sheath, 7Fr standard sheath  6. Catheters: 4Fr 3DRC, 4Fr JL-4, 7Fr [place]-Jalen  7. Fluoroscopy time of 8.3 min.  8. Estimated blood loss of <5 mL.  9. See below for procedure details.    MEDICATIONS:  1. Contrast 46 mL IV.  2. Conscious sedation with fentanyl and midazolam as directed by the attending cardiologist.    Conscious sedation:200 Fentanyl, 4 Versed  Total Sedation Time: 44 Minutes  The patient was monitored by the Dept. Nursing staff under my supervision      Right Heart Catheterization:  /77    BSA 1.78    RA 11/8/8   RV 51/15  PA 45/25/34   PCW 25/25/23   Carlos CO 4.84   Carlos CI 2.72   TD CO 6.4   TD CI 3.6   PA sat 70.1%   Hgb 15.3 g/dL   PVR 2.27  TPR 7.03    CORONARY ANGIOGRAM:   1. Both coronary arteries arise from their respective cusps.  2. Left-dominant.  3. LM is without angiographic evidence of disease.   4. LAD supplies the entire apex (type 3) and gives rise to septal perforators, D1 and D2.  The mLAD has mild luminal irregularities.   5. LCX gives rise to large OM1 and  small OM2 along with trivial distal OM vessels before supplying LPDA and LPL branches.  There is no angiographic evidence of disease.   6. RCA is nondominant. The pRCA has a <25% stenosis.      COMPLICATIONS:  1. None    SUMMARY:   1. Nonischemic cardiomyopathy.  2. Normal right-sided and increased left-sided filling pressures.  3. Mild secondary pulmonary hypertension with a mean PAP of 34 mmHg.  4. Normal cardiac output of 4.84 L/min and cardiac index of 2.72.  2. No angiographic evidence of obstructive coronary artery disease with minimal nonobstructive disease.  3. Arterial and venous sheaths removed in the cath lab.    PLAN:   1. Aspirin 81 mg po daily lifelong.  2. Management of heart failure per primary team.  3. Bedrest per protocol.  4. Return to the primary inpatient team for further evaluation and management.  5. Continued medical management and lifestyle modification for cardiovascular risk factor optimization.     The attending interventional cardiologist was present for the entire procedure.    See CVIS report for final draft.      Kilo Emery MD  Cardiovascular Disease Fellow  [phone]

## 2017-01-11 NOTE — PLAN OF CARE
Problem: Goal Outcome Summary  Goal: Goal Outcome Summary  PT 6C: Per discussion with OT, PT holding until OT can more appropriately screen mobility for PT needs.

## 2017-01-11 NOTE — PLAN OF CARE
Cards fellow brief note    51M with new NICM (EF 13), runs of NSVT on monitor, pt will benefit from Zoll Lifevest placement on discharge for primary prevention of SCD for 3 months, at which point it will be determined if he is a candidate for ICD.    Vasquez Vale MD  Cardiology Fellow  [phone]

## 2017-01-11 NOTE — PLAN OF CARE
Problem: Goal Outcome Summary  Goal: Goal Outcome Summary  Outcome: No Change  D: New Dx Heart Failure. SOB. Pneumonia.     I: NPO at midnight. 1LNC overnight per pt request. Nebsx2. Nicotine patch, Rt Deltoid. Provided emotional support relating to hospital stay, diagnosis, and treatment.    A: A/Ox4. Complained of pain in ribs. Percocetx1. Sinus Rhythm 80-90s. Lungs, crackles in bases. Good urine output. Bowel sounds present. No edema. Up Independently. Rt PIV.    Temp:  [97.9  F (36.6  C)-98.7  F (37.1  C)] 97.9  F (36.6  C)  Heart Rate:  [] 90  Resp:  [16-18] 16  BP: ()/(64-78) 104/78 mmHg  SpO2:  [96 %-98 %] 97 %    P: MRI, angio today. Continue to monitor and assess patient with report of concerns to Cards 1 team.     Myah Devine RN 01/11/2017 8:18 AM    Hours of Care [phone]

## 2017-01-11 NOTE — PLAN OF CARE
Problem: Goal Outcome Summary  Goal: Goal Outcome Summary  Outcome: No Change  DIAP: New NICM EF 13, history of smoking and recent weight loss. Pt is alert and oriented x 4. Went to cath lab, shows clean coronaries, R groin site intact, no hematoma or bleeding noted, band aid on. Pt given PRN percocet for back pain. Pt went to cardiac MRI, check list completed. Started on Metoprolol today and IV Lasix 20 mg diuresis. Pt education given for heart failure. Supportive spouse by bedside. Pt on fluid restriction 2 L and 2gm Na diet.  Continue to monitor and notify primary team.

## 2017-01-11 NOTE — PLAN OF CARE
Problem: Goal Outcome Summary  Goal: Goal Outcome Summary  OT6C: Pt busy with HCL and MRI today, upon check back pt's wife asked to let pt rest.

## 2017-01-11 NOTE — PLAN OF CARE
Problem: Cardiac: Heart Failure (Adult)  Goal: Signs and Symptoms of Listed Potential Problems Will be Absent or Manageable (Cardiac: Heart Failure)  Signs and symptoms of listed potential problems will be absent or manageable by discharge/transition of care (reference Cardiac: Heart Failure (Adult) CPG).   Outcome: No Change  D/I: Monitor shows SR/ST 90-110s (tachycardic with activity). Short burstes of NS VT and aberrant beats this AM. MD aware. RA sats 95% but patient requested O2 with sats of 96%. Lungs have wheezes on the right side and crackles in both bases. C/O pain in ribs with coughing. Requesting percocet when available. Refusing ASA this AM. MD aware. Chest and pelvic CT done. Wife present in AM with many questions. Supportive. HF Education packets given to both patient and wife. Discussed fluid management, diuretics and ACEIs. See flowsheets for assessments and additional data.  A: Stable HF.   P: Continue HF education. Plan for cardiac MRI and angiogram tomorrow. Wife will be present in AM and would like to be present for discussions. Continue current cares and notify provider with issues or concerns.     01/10/17 1930   Cardiac: Heart Failure   Problems Assessed (Heart Failure) all   Problems Present (Heart Failure) cardiac pump dysfunction;dysrhythmia/arrhythmia;fluid/electrolyte imbalance;respiratory compromise;situational response

## 2017-01-11 NOTE — PROGRESS NOTES
Care Coordinator Progress Note     Admission Date/Time:  1/9/2017  Attending MD:  Brynn Moy MD   Data  Chart reviewed, discussed with interdisciplinary team.   Patient was admitted for:    JOHNSON (dyspnea on exertion)  Chronic congestive heart failure, unspecified congestive heart failure type (H).    Concerns with insurance coverage for discharge needs:  Pt does not currently have health insurance. Pt is aware that this admission and the cost of the LifeVest rental will be self-pay per .   Current Living Situation: Patient lives with spouse.  Support System: Supportive and Involved  Services Involved: none currently  Transportation: Family or Friend will provide  Barriers to Discharge: acute illness.   Coordination of Care and Referrals: Provided patient/family with options for DME.    Assessment  Per MD, pt will need to be fitted for a home LifeVest; pt is in agreement with this plan. Pt is aware that the home LifeVest rental will be self-pay.   Intervention:   Script faxed to [employer] (Ph: [phone] Fax: [phone]) for fitting of home LifeVest. Per Theresa Montiel (Cell: [phone]) [facility] [employer] , due to pt not having health insurance pt will need to pay $500 -$1,000 on a credit card before pt can be fitted for a home LifeVest. Theresa said that she will speak with pt and pt's wife tomorrow to discuss a payment plan. Pt and pt's wife are aware of this plan.   Plan  Anticipated Discharge Date:  TBD  Anticipated Discharge Plan:  Discharge to home.   MARCELINO ANN RN BSN  Care Coordinator    ADDENDUM: (1/12/17)  D: Per Theresa Montiel from [employer], pt's Bahai has provided pt with $500 as a down payment for home LifeVest. Theresa added that she needed to receive approval from her manager prior to having pt fitted for a home LifeVest. Pt now has approval and is being fitted for a home LifeVest.

## 2017-01-12 ENCOUNTER — APPOINTMENT (OUTPATIENT)
Dept: OCCUPATIONAL THERAPY | Facility: CLINIC | Age: 52
DRG: 286 | End: 2017-01-12

## 2017-01-12 VITALS
WEIGHT: 145.2 LBS | TEMPERATURE: 98.2 F | HEIGHT: 67 IN | SYSTOLIC BLOOD PRESSURE: 96 MMHG | DIASTOLIC BLOOD PRESSURE: 68 MMHG | OXYGEN SATURATION: 96 % | BODY MASS INDEX: 22.79 KG/M2 | RESPIRATION RATE: 18 BRPM | HEART RATE: 107 BPM

## 2017-01-12 LAB
ANION GAP SERPL CALCULATED.3IONS-SCNC: 5 MMOL/L (ref 3–14)
BASOPHILS # BLD AUTO: 0 10E9/L (ref 0–0.2)
BASOPHILS NFR BLD AUTO: 0.3 %
BUN SERPL-MCNC: 12 MG/DL (ref 7–30)
CALCIUM SERPL-MCNC: 8.6 MG/DL (ref 8.5–10.1)
CHLORIDE SERPL-SCNC: 103 MMOL/L (ref 94–109)
CO2 SERPL-SCNC: 31 MMOL/L (ref 20–32)
CREAT SERPL-MCNC: 0.85 MG/DL (ref 0.66–1.25)
DIFFERENTIAL METHOD BLD: ABNORMAL
EOSINOPHIL # BLD AUTO: 0.2 10E9/L (ref 0–0.7)
EOSINOPHIL NFR BLD AUTO: 1.3 %
ERYTHROCYTE [DISTWIDTH] IN BLOOD BY AUTOMATED COUNT: 13.6 % (ref 10–15)
GFR SERPL CREATININE-BSD FRML MDRD: NORMAL ML/MIN/1.7M2
GLUCOSE SERPL-MCNC: 91 MG/DL (ref 70–99)
HCT VFR BLD AUTO: 51.1 % (ref 40–53)
HGB BLD-MCNC: 16.4 G/DL (ref 13.3–17.7)
IMM GRANULOCYTES # BLD: 0 10E9/L (ref 0–0.4)
IMM GRANULOCYTES NFR BLD: 0.3 %
INR PPP: 1.12 (ref 0.86–1.14)
LYMPHOCYTES # BLD AUTO: 3.9 10E9/L (ref 0.8–5.3)
LYMPHOCYTES NFR BLD AUTO: 32.5 %
MAGNESIUM SERPL-MCNC: 2.1 MG/DL (ref 1.6–2.3)
MCH RBC QN AUTO: 30.7 PG (ref 26.5–33)
MCHC RBC AUTO-ENTMCNC: 32.1 G/DL (ref 31.5–36.5)
MCV RBC AUTO: 96 FL (ref 78–100)
MONOCYTES # BLD AUTO: 1.1 10E9/L (ref 0–1.3)
MONOCYTES NFR BLD AUTO: 8.8 %
NEUTROPHILS # BLD AUTO: 6.8 10E9/L (ref 1.6–8.3)
NEUTROPHILS NFR BLD AUTO: 56.8 %
NRBC # BLD AUTO: 0 10*3/UL
NRBC BLD AUTO-RTO: 0 /100
PLATELET # BLD AUTO: 215 10E9/L (ref 150–450)
POTASSIUM SERPL-SCNC: 4.2 MMOL/L (ref 3.4–5.3)
RBC # BLD AUTO: 5.34 10E12/L (ref 4.4–5.9)
SODIUM SERPL-SCNC: 139 MMOL/L (ref 133–144)
WBC # BLD AUTO: 11.9 10E9/L (ref 4–11)

## 2017-01-12 PROCEDURE — 80048 BASIC METABOLIC PNL TOTAL CA: CPT | Performed by: STUDENT IN AN ORGANIZED HEALTH CARE EDUCATION/TRAINING PROGRAM

## 2017-01-12 PROCEDURE — 25000132 ZZH RX MED GY IP 250 OP 250 PS 637: Performed by: INTERNAL MEDICINE

## 2017-01-12 PROCEDURE — 99239 HOSP IP/OBS DSCHRG MGMT >30: CPT | Mod: GC | Performed by: INTERNAL MEDICINE

## 2017-01-12 PROCEDURE — 40000133 ZZH STATISTIC OT WARD VISIT

## 2017-01-12 PROCEDURE — 36415 COLL VENOUS BLD VENIPUNCTURE: CPT | Performed by: STUDENT IN AN ORGANIZED HEALTH CARE EDUCATION/TRAINING PROGRAM

## 2017-01-12 PROCEDURE — 25000132 ZZH RX MED GY IP 250 OP 250 PS 637: Performed by: STUDENT IN AN ORGANIZED HEALTH CARE EDUCATION/TRAINING PROGRAM

## 2017-01-12 PROCEDURE — 85025 COMPLETE CBC W/AUTO DIFF WBC: CPT | Performed by: STUDENT IN AN ORGANIZED HEALTH CARE EDUCATION/TRAINING PROGRAM

## 2017-01-12 PROCEDURE — 83735 ASSAY OF MAGNESIUM: CPT | Performed by: STUDENT IN AN ORGANIZED HEALTH CARE EDUCATION/TRAINING PROGRAM

## 2017-01-12 PROCEDURE — 85610 PROTHROMBIN TIME: CPT | Performed by: STUDENT IN AN ORGANIZED HEALTH CARE EDUCATION/TRAINING PROGRAM

## 2017-01-12 PROCEDURE — 97535 SELF CARE MNGMENT TRAINING: CPT | Mod: GO

## 2017-01-12 PROCEDURE — 25000125 ZZHC RX 250: Performed by: INTERNAL MEDICINE

## 2017-01-12 PROCEDURE — 85049 AUTOMATED PLATELET COUNT: CPT | Performed by: STUDENT IN AN ORGANIZED HEALTH CARE EDUCATION/TRAINING PROGRAM

## 2017-01-12 RX ORDER — FUROSEMIDE 20 MG
20 TABLET ORAL DAILY PRN
Qty: 60 TABLET | Refills: 1 | Status: SHIPPED | OUTPATIENT
Start: 2017-01-12 | End: 2017-09-22

## 2017-01-12 RX ORDER — METOPROLOL SUCCINATE 25 MG/1
12.5 TABLET, EXTENDED RELEASE ORAL DAILY
Qty: 15 TABLET | Refills: 2 | Status: SHIPPED | OUTPATIENT
Start: 2017-01-12 | End: 2017-01-23

## 2017-01-12 RX ORDER — LISINOPRIL 5 MG/1
5 TABLET ORAL DAILY
Qty: 30 TABLET | Refills: 2 | Status: SHIPPED | OUTPATIENT
Start: 2017-01-12 | End: 2017-02-13

## 2017-01-12 RX ADMIN — LISINOPRIL 5 MG: 5 TABLET ORAL at 11:28

## 2017-01-12 RX ADMIN — OXYCODONE HYDROCHLORIDE AND ACETAMINOPHEN 1 TABLET: 5; 325 TABLET ORAL at 13:27

## 2017-01-12 RX ADMIN — HEPARIN SODIUM 5000 UNITS: 5000 INJECTION, SOLUTION INTRAVENOUS; SUBCUTANEOUS at 06:03

## 2017-01-12 RX ADMIN — Medication 100 MG: at 08:11

## 2017-01-12 RX ADMIN — MULTIPLE VITAMINS W/ MINERALS TAB 1 TABLET: TAB at 08:12

## 2017-01-12 RX ADMIN — Medication 12.5 MG: at 08:12

## 2017-01-12 RX ADMIN — ASPIRIN 81 MG: 81 TABLET, COATED ORAL at 08:12

## 2017-01-12 RX ADMIN — OXYCODONE HYDROCHLORIDE AND ACETAMINOPHEN 1 TABLET: 5; 325 TABLET ORAL at 08:12

## 2017-01-12 ASSESSMENT — PAIN DESCRIPTION - DESCRIPTORS
DESCRIPTORS: ACHING
DESCRIPTORS: SORE;ACHING
DESCRIPTORS: SORE

## 2017-01-12 NOTE — PLAN OF CARE
Problem: Goal Outcome Summary  Goal: Goal Outcome Summary  Outcome: No Change  D: New onset cardiomyopathy, heart failure.  I/A: VSS. C/o mild discomfort at groin site from procedure. Declined pain meds. Sinus rhythm. Sleeping well.  P: Continue to monitor.

## 2017-01-12 NOTE — DISCHARGE SUMMARY
Cards 1 Discharge Summary    Reason for Admission: New NICM (EF 9%)    Date of Admission: 1/9/17  Date of Discharge: 1/12/17    HPI/Hosptial Course: 51M PMHx tobacco use, admitted with 50 lb weight loss, and new dilated NICM (EF 9%). The patient was volume overloaded on admission, without evidence of cardiogenic shock. He was initially treated with gentle decongestion and afterload reduction. After optimization, he underwent coronary angiogram which showed clean coronaries, and RHC with normal CI, and mildly elevated right and left heart filling pressures. Eventually, a low dose toprol xl 12.5 was added, which pt tolerated well. He was fitted with a Zoll Lifevest for primary prevention of SCD, while awaiting 3 months of GDMT. He was close to euvolemic on discharge, so will not be discharged on standing lasix, but will be instructed to check his weight daily and take lasix 20 prn for weight gain >3 lbs in 48 hrs. He will follow up with Dr Moy in 2 weeks for optimization of medications and consideration of spironolactone.    The etiology of the NICM remains unclear, dilation (7.2 cm) implies chronicity, pt denies drug/etoh use (confiremd by UTox), MRI with extensive fibrosis in non-segmental distribution, suggestive of possible past episode of severe inflammatory condition like fulminant myocarditis.    With regard to the 50 lb weight loss; pt states he was under a lot of stress and eating very little calories per day, but still concerning for occult infection or malignancy, basic workup unrevealing so far. Obtained CT chest/abd/pelvis which showed mildly prominent mediastinal lymphadenopathy and multiple sub-centimeter nodules bilateral, which is not much changed from 2012. Discussed findings with pulmonology, who recommended serial imaging in 1 year. A quantiferon gold test is also in process, and will be followed up.    # Tobacco dependence  - NRT, smoking cessation counseling    # Ppx  - SQH    # Dispo  - Home  today    [] F/u Dr Moy in 2 weeks in [facility] to tirate up medications.    Pt discussed with attending on rounds.    Vasquez Vale  Cardiology Fellow  [phone]    Patient has been seen and evaluated by me. Discussed with the team and agree with the findings and plan in this resident/fellow/nurse practitioner's discharge note.    I have interviewed and examined the patient. I have reviewed the laboratory tests, imaging, and other investigations. I agree with the documentation and plan as outlined by the fellow/resident/nurse practitioner in this discharge note.    The discharge process took 35 minutes.    Brynn Moy MD, MS  Cardiology/Cardiac EP Attending Staff

## 2017-01-12 NOTE — PLAN OF CARE
Problem: Goal Outcome Summary  Goal: Goal Outcome Summary  Outcome: Adequate for Discharge Date Met:  01/12/17  DIAP:  New NICM EF 13% .Pt is alert and oriented x 4. VSS on room air, cardiac monitor show SR-ST. Pt c/o of back pain and R groin discomfort, prn Percocet given with effect. Up independently.  Pt awaiting discharge instructions for Zoll Live Vest teaching and placement, pt to go home on a Live Vest.  Continue to monitor.

## 2017-01-12 NOTE — PLAN OF CARE
Problem: Goal Outcome Summary  Goal: Goal Outcome Summary  Outcome: No Change  D/I: Pt had R and L heart cath and cardiac MRI today. MD came and talked to pt and the wife today about the results. SR/ST 80s-130s with 0-13 PVCs. Sats low/mid 90s on RA. Last BP 85/61, lactic acid protocol triggered. Pt does not have insurance. Wife is wanting to talk to the Care coordinator/SW tomorrow about switching insurance companies. Pt needs a Zoll Life Vest before DC but will have difficulty paying  for that. Percocet for back pain with relief.  A: Stable.  P: Monitor and assist as needed.

## 2017-01-13 ENCOUNTER — CARE COORDINATION (OUTPATIENT)
Dept: CARE COORDINATION | Facility: CLINIC | Age: 52
End: 2017-01-13

## 2017-01-13 ENCOUNTER — TELEPHONE (OUTPATIENT)
Dept: FAMILY MEDICINE | Facility: CLINIC | Age: 52
End: 2017-01-13

## 2017-01-13 DIAGNOSIS — R07.81 RIB PAIN: ICD-10-CM

## 2017-01-13 DIAGNOSIS — J20.9 ACUTE BRONCHITIS, UNSPECIFIED ORGANISM: Primary | ICD-10-CM

## 2017-01-13 LAB
M TB TUBERC IFN-G BLD QL: NEGATIVE
M TB TUBERC IFN-G/MITOGEN IGNF BLD: 0.02 IU/ML

## 2017-01-13 RX ORDER — ALBUTEROL SULFATE 90 UG/1
1-2 AEROSOL, METERED RESPIRATORY (INHALATION) EVERY 4 HOURS PRN
Qty: 3 INHALER | Refills: 1 | Status: SHIPPED | OUTPATIENT
Start: 2017-01-13 | End: 2017-01-16 | Stop reason: ALTCHOICE

## 2017-01-13 RX ORDER — OXYCODONE AND ACETAMINOPHEN 5; 325 MG/1; MG/1
1 TABLET ORAL EVERY 6 HOURS PRN
Qty: 20 TABLET | Refills: 0 | Status: SHIPPED | OUTPATIENT
Start: 2017-01-13 | End: 2017-01-18

## 2017-01-13 NOTE — PROGRESS NOTES
"[facility]  \"Hello, my name is Sugar Stubbs , and I am calling from the [facility].  I want to check in and see how you are doing, after leaving the hospital.  You may also receive a call from your Care Coordinator (care team), but I want to make sure you don t have any urgent needs.  I have a couple questions to review with you:     Post-Discharge Outreach                                                    Akshat Fragoso is a 51 year old male     Date of Admission: 1/9/17  Date of Discharge: 1/12/17    Follow-up Appointments      Adult [facility] Follow-up and recommended labs and tests        Follow up with primary care provider, Denise White, within 7 days for hospital follow- up.  No follow up labs or test are needed. Follow-up on TB Gold Quant Test                     Care Team:    Patient Care Team       Relationship Specialty Notifications Start End    Denise White APRN CNP PCP - General Nurse Practitioner  1/6/17     Phone: [phone] Fax: [phone]         [facility] [address] [place] [address]            Transition of Care Review                                                      Telephone Encounter Info      Author Note Status Last Update User Last Update Date/Time     Leslie Abraham, RN Signed Leslie Abraham, RN 1/13/2017  8:37 AM       Telephone Encounter      Expand All Collapse All    Please sign off on care coordinator referral  ED/Discharge Protocol    \"Hi, my name is Leslie Abraham, a registered nurse, and I am calling on behalf of Denise White's office at [place].  I am calling to follow up and see how things are going for you after your recent visit.\"    \"I see that you were in the (ER/UC/IP) on 1/9/2017.     How are you doing now that you are home?\" ok, but having some pain on and off    Is patient experiencing symptoms that may require a hospital visit?  No    Discharge " "Instructions    \"Let's review your discharge instructions.  What is/are the follow-up recommendations?  Pt. Response: to follow up with primary and cardiologist    \"Were you instructed to make a follow-up appointment?\"  Pt. Response: Yes.  Has appointment been made?   No.  \"Can I help you schedule that appointment?\" appt scheduled for next Wednesday 1/18/2017       \"When you see the provider, I would recommend that you bring your discharge instructions with you.    Medications    \"How many new medications are you on since your hospitalization/ED visit?\"                2 or more - Epic MTM referral needed  \"How many of your current medicines changed (dose, timing, name, etc.) while you were in the hospital/ED visit?\"              0-1  \"Do you have questions about your medications?\"              No  \"Were you newly diagnosed with heart failure, COPD, diabetes or did you have a heart attack?\"              Yes - Care Coordination Referral needed  For patients on insulin: \"Did you start on insulin in the hospital or did you have your insulin dose changed?\"              No    Medication reconciliation completed? Yes    Was MTM referral placed (*Make sure to put transitions as reason for referral)?              No    Call Summary    \"Do you have any questions or concerns about your condition or care plan at the moment?\"    No  Triage nurse advice given: take meds as prescribed and follow up with provider. To call if any questions or concerns    Patient was in ER 1 time in the past year (assess appropriateness of ER visits.)       \"If you have questions or things don't continue to improve, we encourage you contact us through the main clinic number,  [phone].  Even if the clinic is not open, triage nurses are available 24/7 to help you.     We would like you to know that our clinic has extended hours (provide information).  We also have urgent care (provide details on closest location and hours/contact " "info)\"      \"Thank you for your time and take care!\"             Next 5 appointments (look out 90 days)     Jan 18, 2017  3:20 PM   Office Visit with VERONICA Hallman CNP   [facility]    [address] [place] [id number] [phone]                      Plan                                                      Thanks for your time.  Your Care Coordinator will follow-up within the next couple days.  In the meantime if you have questions, concerns or problems call your care team.        Sugar Stubbs      "

## 2017-01-13 NOTE — TELEPHONE ENCOUNTER
Telephone call to the patient.  He reports that he is feeling much better since he was discharged to home from the hospital.  He continues to use his albuterol inhaler only occasionally for shortness of breath/wheezing. When he uses that it is effective.    Pain:  He continues to have rib pain and some right groin pain from his heart catheterization. He does state that this is getting better day today but he still needs a few Percocet to get him by. I discussed with him the importance of infrequent use of the Percocet. Percocet also has acetaminophen or Tylenol in it and he is not to take regular over-the-counter Tylenol along with the Percocet. He verbalizes understanding of this.  He is to maintain and keep his follow-up appointment that he has scheduled with me next week. He will return to the clinic sooner or be evaluated if any of his heart or respiratory symptoms worsen in any way.    Orders were approved.  The Percocet prescription was printed, signed, and handed to the pharmacy team at [facility].  The patient or his wife will  the prescription tonight.  He was appreciative.

## 2017-01-13 NOTE — PLAN OF CARE
Problem: Goal Outcome Summary  Goal: Goal Outcome Summary  Outcome: Therapy, progress towards functional goals is fair  OT/CR: Therapist educated pt on EC/WS. Therapist provided pt with HF folder and educated pt on contents. Pt verbalized understanding. Educated pt on OP CR as pt plans to have insurance within the month. Pt verbalized understanding. VSS.     REC: Discharge Home with A and future OP CR when medically appropriate

## 2017-01-13 NOTE — TELEPHONE ENCOUNTER
Discharged from hospital yesterday, requesting refills on albuterol and percocet, please sign off on med, he has a hospital follow up appt with you next Wednesday 1/18/2017.

## 2017-01-13 NOTE — PLAN OF CARE
Problem: Goal Outcome Summary  Goal: Goal Outcome Summary  Outcome: Adequate for Discharge Date Met:  01/12/17  DISCHARGE                         1/12/2017  4:31 PM  ----------------------------------------------------------------------------  Discharged to: Home  Via: Automobile  Accompanied by: Wife  Discharge Instructions: diet, activity, medications, follow up appointments, when to call the MD, aftercare instructions, and what to watchout for (i.e. s/s of infection, increasing SOB, palpitations, chest pain) Vesna Fulton teaching and HF education completed.  Importance of smoking cessation also reviewed prior to discharge.  Prescriptions: To be filled by discharge pharmacy per pt's request; medication list reviewed & sent with pt  Follow Up Appointments: arranged; information given  Belongings: All sent with pt  IV: out  Telemetry: off  Pt exhibits understanding of above discharge instructions; all questions answered.    Discharge Paperwork: Signed, copied, and sent home with patient.

## 2017-01-13 NOTE — PLAN OF CARE
Problem: Goal Outcome Summary  Goal: Goal Outcome Summary  Occupational Therapy and Cardiac Rehab Discharge Summary    Reason for therapy discharge:    Discharged to home.    Progress towards therapy goal(s). See goals on Care Plan in [facility] electronic health record for goal details.  Goals partially met.  Barriers to achieving goals:   discharge from facility.    Therapy recommendation(s):    Continued therapy is recommended.  Rationale/Recommendations:  Discharge Home with A and follow up with OP CR..

## 2017-01-16 ENCOUNTER — TELEPHONE (OUTPATIENT)
Dept: CARDIOLOGY | Facility: CLINIC | Age: 52
End: 2017-01-16

## 2017-01-16 NOTE — TELEPHONE ENCOUNTER
Per Dr. Moy, patient is to be added to his [facility] 1/24 clinic.  Patient is add-on for 1 pm d/t patient's schedule.  Directions given and patient verbalized understanding.  Leslie Larios RN

## 2017-01-17 PROBLEM — R91.8 PULMONARY NODULES: Status: ACTIVE | Noted: 2017-01-17

## 2017-01-18 ENCOUNTER — OFFICE VISIT (OUTPATIENT)
Dept: FAMILY MEDICINE | Facility: CLINIC | Age: 52
End: 2017-01-18

## 2017-01-18 VITALS
HEART RATE: 101 BPM | TEMPERATURE: 99 F | DIASTOLIC BLOOD PRESSURE: 64 MMHG | OXYGEN SATURATION: 97 % | SYSTOLIC BLOOD PRESSURE: 98 MMHG | RESPIRATION RATE: 20 BRPM | WEIGHT: 150 LBS | BODY MASS INDEX: 23.49 KG/M2

## 2017-01-18 DIAGNOSIS — Z87.891 PERSONAL HISTORY OF TOBACCO USE, PRESENTING HAZARDS TO HEALTH: ICD-10-CM

## 2017-01-18 DIAGNOSIS — I42.8 CARDIOMYOPATHY, NONISCHEMIC (H): Primary | ICD-10-CM

## 2017-01-18 DIAGNOSIS — R07.81 RIB PAIN: ICD-10-CM

## 2017-01-18 DIAGNOSIS — R91.8 PULMONARY NODULES: ICD-10-CM

## 2017-01-18 DIAGNOSIS — Z09 HOSPITAL DISCHARGE FOLLOW-UP: ICD-10-CM

## 2017-01-18 PROCEDURE — 99214 OFFICE O/P EST MOD 30 MIN: CPT | Performed by: NURSE PRACTITIONER

## 2017-01-18 RX ORDER — OXYCODONE AND ACETAMINOPHEN 5; 325 MG/1; MG/1
1 TABLET ORAL EVERY 6 HOURS PRN
Qty: 20 TABLET | Refills: 0 | Status: SHIPPED | OUTPATIENT
Start: 2017-01-18 | End: 2017-01-25

## 2017-01-18 NOTE — PROGRESS NOTES
SUBJECTIVE:                                                    Akshat Fragoso is a 51 year old male who presents to clinic today for the following health issues:      Hospital Follow-up Visit:    Hospital/Nursing Home/ Rehab Facility: [facility]  Date of Admission: 1/9/17  Date of Discharge: 1/13/17  Reason(s) for Admission: Heart Problems     Akshat is feeling much better since his hospitalization.  He is taking his meds as prescribed.  He has an external defibrillator in place (it has not gone off).  He is taking his medications as prescribed.  His weight has been stable and he has not needed to take any lasix.  Low salt/no salt diet.  He will continue care with cardiology/Dr. Warner.              Problems taking medications regularly:  None       Medication changes since discharge: None       Problems adhering to non-medication therapy:  None    Summary of hospitalization:  [facility] discharge summary reviewed  Diagnostic Tests/Treatments reviewed.  Follow up needed: none  Other Healthcare Providers Involved in Patient s Care:         None  Update since discharge: improved. improved    Post Discharge Medication Reconciliation: discharge medications reconciled, continue medications without change.  Plan of care communicated with patient     Coding guidelines for this visit:  Type of Medical   Decision Making Face-to-Face Visit       within 7 Days of discharge Face-to-Face Visit        within 14 days of discharge   Moderate Complexity [id number]   High Complexity [id number]            -------------------------------------    Problem list and histories reviewed & adjusted, as indicated.  Additional history: as documented    Patient Active Problem List   Diagnosis     CARDIOVASCULAR SCREENING; LDL GOAL LESS THAN 160     Acute right lumbar radiculopathy     Personal history of smoking     CHF (congestive heart failure) (H)     Acute systolic heart failure (H)     Cardiomyopathy,  unspecified (H)     Cardiomyopathy, nonischemic (H)     Pulmonary nodules, next CT due 1/2018     Past Surgical History   Procedure Laterality Date     None         Social History   Substance Use Topics     Smoking status: Former Smoker -- 0.50 packs/day for 15 years     Types: Cigarettes     Quit date: 12/01/2016     Smokeless tobacco: Former User     Quit date: 10/24/2011     Alcohol Use: No     History reviewed. No pertinent family history.      Current Outpatient Prescriptions   Medication Sig Dispense Refill     albuterol (2.5 MG/3ML) 0.083% neb solution Take 1 vial (2.5 mg) by nebulization every 4 hours as needed for shortness of breath / dyspnea or wheezing 1 Box 3     oxyCODONE-acetaminophen (PERCOCET) 5-325 MG per tablet Take 1 tablet by mouth every 6 hours as needed for pain . Use sparingly only for severe pain. 20 tablet 0     aspirin EC 81 MG EC tablet Take 1 tablet (81 mg) by mouth daily 30 tablet 2     lisinopril (PRINIVIL/ZESTRIL) 5 MG tablet Take 1 tablet (5 mg) by mouth daily 30 tablet 2     metoprolol (TOPROL-XL) 25 MG 24 hr tablet Take 0.5 tablets (12.5 mg) by mouth daily 15 tablet 2     furosemide (LASIX) 20 MG tablet Take 1 tablet (20 mg) by mouth daily as needed Only take if weight up by 3lbs 60 tablet 1     Allergies   Allergen Reactions     No Known Allergies      Recent Labs   Lab Test  01/12/17   0655  01/11/17   1735  01/10/17   0718   01/09/17   1001   LDL   --    --   64   --    --    HDL   --    --   39*   --    --    TRIG   --    --   103   --    --    ALT   --    --    --    --   111*   CR  0.85  0.94  0.90   < >  0.85   GFRESTIMATED  >90  Non  GFR Calc    85  90   < >  >90  Non  GFR Calc     GFRESTBLACK  >90   GFR Calc    >90   GFR Calc    >90   GFR Calc     < >  >90   GFR Calc     POTASSIUM  4.2  4.2  4.4   --   4.9   TSH   --    --    --    --   1.34    < > = values in this interval not  displayed.      BP Readings from Last 3 Encounters:   01/18/17 98/64   01/12/17 96/68   01/04/17 131/86    Wt Readings from Last 3 Encounters:   01/18/17 150 lb (68.04 kg)   01/12/17 145 lb 3.2 oz (65.862 kg)   01/04/17 161 lb (73.029 kg)            Labs reviewed in EPIC  Problem list, Medication list, Allergies, and Medical/Social/Surgical histories reviewed in [facility] and updated as appropriate.    ROS:  Constitutional, HEENT, cardiovascular, pulmonary, gi and gu systems are negative, except as otherwise noted.    OBJECTIVE:                                                    BP 98/64 mmHg  Pulse 101  Temp(Src) 99  F (37.2  C) (Oral)  Resp 20  Wt 150 lb (68.04 kg)  SpO2 97%  Body mass index is 23.49 kg/(m^2).  Constitutional: healthy, alert and no distress  Cardiovascular: RRR. No murmurs, clicks gallops or rub. External defibrillator in place.  Respiratory: Respirations easy and regular. No respiratory distress noted. Lung sounds clear to auscultation.  Neurologic: Gait normal. Reflexes normal and symmetric. Sensation grossly WNL.  Psychiatric: mentation appears normal and affect normal/bright       ASSESSMENT/PLAN:                                                    (I42.9) Cardiomyopathy, nonischemic (H)  (primary encounter diagnosis)  Comment: improved  Plan:     (Z87.891) Personal history of tobacco use, presenting hazards to health  Comment:   Plan: nicotine (NICODERM CQ) 7 MG/24HR 24 hr patch        He requested a nicoderm patches today.  Last cigarette the first part of November.  He has used thepatches before and they were helpful.    (R07.81) Rib pain  Comment:   Plan: oxyCODONE-acetaminophen (PERCOCET) 5-325 MG per        tablet        I reviewed the useof percocet.  This is my last rx for percocet for his rib pain.  He is to follow up with me with any additional problems.    (R91.8) Pulmonary nodules, next CT due 1/2018  Comment:   Plan: I reviewed his CT and the pulmonary nodules with the patient.  He  is to repeat his CT scan in one year and he verbalizes understanding.    (Z09) Hospital discharge follow-up  Comment:   Plan: improved          VERONICA Doran [facility]

## 2017-01-18 NOTE — NURSING NOTE
"Chief Complaint   Patient presents with     Hospital F/U       Initial BP 98/64 mmHg  Pulse 101  Temp(Src) 99  F (37.2  C) (Oral)  Resp 20  Wt 150 lb (68.04 kg)  SpO2 97% Estimated body mass index is 23.49 kg/(m^2) as calculated from the following:    Height as of 1/9/17: 5' 7\" (1.702 m).    Weight as of this encounter: 150 lb (68.04 kg).  BP completed using cuff size: negar Carrera MA     "

## 2017-01-20 ENCOUNTER — PRE VISIT (OUTPATIENT)
Dept: CARDIOLOGY | Facility: CLINIC | Age: 52
End: 2017-01-20

## 2017-01-23 ENCOUNTER — OFFICE VISIT (OUTPATIENT)
Dept: CARDIOLOGY | Facility: CLINIC | Age: 52
End: 2017-01-23

## 2017-01-23 VITALS
HEART RATE: 91 BPM | DIASTOLIC BLOOD PRESSURE: 63 MMHG | OXYGEN SATURATION: 95 % | WEIGHT: 151 LBS | BODY MASS INDEX: 23.64 KG/M2 | SYSTOLIC BLOOD PRESSURE: 103 MMHG

## 2017-01-23 DIAGNOSIS — R05.3 CHRONIC COUGH: Primary | ICD-10-CM

## 2017-01-23 DIAGNOSIS — I50.9 CHRONIC CONGESTIVE HEART FAILURE, UNSPECIFIED CONGESTIVE HEART FAILURE TYPE: ICD-10-CM

## 2017-01-23 DIAGNOSIS — I42.0 CARDIOMYOPATHY, DILATED, NONISCHEMIC (H): ICD-10-CM

## 2017-01-23 PROCEDURE — 99213 OFFICE O/P EST LOW 20 MIN: CPT | Mod: GC | Performed by: INTERNAL MEDICINE

## 2017-01-23 RX ORDER — METOPROLOL SUCCINATE 25 MG/1
25 TABLET, EXTENDED RELEASE ORAL DAILY
Qty: 30 TABLET | Refills: 2 | Status: SHIPPED | OUTPATIENT
Start: 2017-01-23 | End: 2017-02-27

## 2017-01-23 ASSESSMENT — PAIN SCALES - GENERAL: PAINLEVEL: WORST PAIN (10)

## 2017-01-23 NOTE — Clinical Note
"1/23/2017      RE: Akshat Fragoso  [address]  [facility] [address]       HPI:   51M, with recently diagnosed NICM (EF ~10%), who is following up after hospital discharge (1/12/17) for possible uptitration of his medications.    Since discharge, he says he has been feeling much better although still somewhat fatigued, continues to complain of rib pain (that has been there for over a year, and is attributed to multiple pneumonias). He complains of vertigo (clearly says the room spins around him), this has been happening for over a month now, and seems to be exclusively positional (with certain head movements), as opposed to orthostatic.    His weight had been pretty stable until last night at ~149 lbs. However yesterday he started feeling more tired, SOB and noticed some swelling over his legs. Took 20 mg Lasix last night and says he urinated all night long, but was feeling \"100% better\" this morning, and his weight was back down to 149 lbs. He doesn't have a BP monitor at home and has not checked his BP.    His Quantiferron, which was in process on discharge, came back negative.     Brief review of cardiac history:  51M PMHx tobacco use, admitted with 50 lb weight loss, and new dilated NICM (EF 9%). The patient was volume overloaded on admission, without evidence of cardiogenic shock. He was initially treated with gentle decongestion and afterload reduction. After optimization, he underwent coronary angiogram which showed clean coronaries, and RHC with normal CI, and mildly elevated right and left heart filling pressures. Eventually, a low dose toprol xl 12.5 was added, which pt tolerated well. He was fitted with a Zoll Lifevest for primary prevention of SCD, while awaiting 3 months of GDMT. He was close to euvolemic on discharge, so will not be discharged on standing lasix, but will be instructed to check his weight daily and take lasix 20 prn for weight gain >3 lbs in 48 hrs. He will follow up with Dr" Farzaneh in 2 weeks for optimization of medications and consideration of spironolactone.    The etiology of the NICM remains unclear, dilation (7.2 cm) implies chronicity, pt denies drug/etoh use (confiremd by UTox), MRI with extensive fibrosis in non-segmental distribution, suggestive of possible past episode of severe inflammatory condition like fulminant myocarditis.    With regard to the 50 lb weight loss; pt states he was under a lot of stress and eating very little calories per day, but still concerning for occult infection or malignancy, basic workup unrevealing so far. Obtained CT chest/abd/pelvis which showed mildly prominent mediastinal lymphadenopathy and multiple sub-centimeter nodules bilateral, which is not much changed from 2012. Discussed findings with pulmonology, who recommended serial imaging in 1 year. A quantiferon gold test is also in process, and will be followed up (came back negative).        PAST MEDICAL HISTORY:  Past Medical History   Diagnosis Date     CARDIOVASCULAR SCREENING; LDL GOAL LESS THAN 160 5/9/2010     Pneumonia 11/2011       CURRENT MEDICATIONS:  Current Outpatient Prescriptions   Medication Sig Dispense Refill     nicotine (NICODERM CQ) 7 MG/24HR 24 hr patch Place 1 patch onto the skin every 24 hours 30 patch 1     oxyCODONE-acetaminophen (PERCOCET) 5-325 MG per tablet Take 1 tablet by mouth every 6 hours as needed for pain . Use sparingly only for severe pain. 20 tablet 0     albuterol (2.5 MG/3ML) 0.083% neb solution Take 1 vial (2.5 mg) by nebulization every 4 hours as needed for shortness of breath / dyspnea or wheezing 1 Box 3     aspirin EC 81 MG EC tablet Take 1 tablet (81 mg) by mouth daily 30 tablet 2     lisinopril (PRINIVIL/ZESTRIL) 5 MG tablet Take 1 tablet (5 mg) by mouth daily 30 tablet 2     metoprolol (TOPROL-XL) 25 MG 24 hr tablet Take 0.5 tablets (12.5 mg) by mouth daily 15 tablet 2     furosemide (LASIX) 20 MG tablet Take 1 tablet (20 mg) by mouth daily as  needed Only take if weight up by 3lbs 60 tablet 1       PAST SURGICAL HISTORY:  Past Surgical History   Procedure Laterality Date     None         ALLERGIES:     Allergies   Allergen Reactions     No Known Allergies        FAMILY HISTORY:  - Premature coronary artery disease  - Atrial fibrillation  - Sudden cardiac death     SOCIAL HISTORY:  Social History   Substance Use Topics     Smoking status: Former Smoker -- 0.50 packs/day for 15 years     Types: Cigarettes     Quit date: 12/01/2016     Smokeless tobacco: Former User     Quit date: 10/24/2011     Alcohol Use: No       ROS:   Constitutional: No fever, chills, or sweats. Weight stable.   Cardiovascular: As per HPI.   Respiratory: + dry cough, stable for a year now, No hemoptysis.    GI: No nausea, vomiting, hematemesis, melena, or hematochezia.   : No hematuria.   Integument: Negative.   Psychiatric: Negative.   Hematologic:  Easy bruising, no easy bleeding.  Neuro: As per HPI.   Endocrinology: No significant heat or cold intolerance   Musculoskeletal: No myalgia.    Exam:  /63 mmHg  Pulse 91  Wt 68.493 kg (151 lb)  SpO2 95%  GEN: aao x 3, NAD  Neck: No JVD elevation  LUNGS: No wheezing. +faint rales at both bases  HEART: S1S2 audible, no murmurs or rubs. Regular rhythm  ABDOMEN: Soft, nt, nd. +BS  EXTREMITIES: Warm calves, +DPs, trace LE edema bilaterally  NEURO: aao x 3, no focal deficits      Labs:  CBC RESULTS:   Lab Results   Component Value Date    WBC 11.9* 01/12/2017    RBC 5.34 01/12/2017    HGB 16.4 01/12/2017    HCT 51.1 01/12/2017    MCV 96 01/12/2017    MCH 30.7 01/12/2017    MCHC 32.1 01/12/2017    RDW 13.6 01/12/2017     01/12/2017       BMP RESULTS:  Lab Results   Component Value Date     01/12/2017    POTASSIUM 4.2 01/12/2017    CHLORIDE 103 01/12/2017    CO2 31 01/12/2017    ANIONGAP 5 01/12/2017    GLC 91 01/12/2017    BUN 12 01/12/2017    CR 0.85 01/12/2017    GFRESTIMATED >90  Non  GFR Calc    01/12/2017    GFRESTBLACK >90   GFR Calc   01/12/2017    TONY 8.6 01/12/2017        INR RESULTS:  Lab Results   Component Value Date    INR 1.12 01/12/2017    INR 1.09 01/11/2017    INR 1.16* 01/10/2017    INR 1.13 01/09/2017       Procedures:  CMR - 1/11/17:  Severe non-ischemic cardiomyopathy with biventricular involvement.  LVEF of 9% and RVEF of 18%. Extensive fibrosis that is suggestive of  an inflammatory etiology to the cardiomyopathy such as a fulminant  myocarditis. LV thinning is suggestive of a subacute or chronic  timeline for the cardiomyopathy (i.e., the myocarditis does not appear  to be acute).    RHC/Cor Angio - 1/11/17:  1. Nonischemic cardiomyopathy.  2. Normal right-sided and increased left-sided filling pressures.  3. Mild secondary pulmonary hypertension with a mean PAP of 34 mmHg.  4. Normal cardiac output of 4.84 L/min and cardiac index of 2.72.  5. No angiographic evidence of obstructive coronary artery disease with minimal nonobstructive disease.    Echo - 1/9/17:  Severe left ventricular dilation is present. Severe diffuse hypokinesis is  present. The Ejection Fraction is estimated at 10-15%. Traced at 13%.  Mild right ventricular dilation is present. Global right ventricular function  is mildly reduced.  Left ventricular diastolic function is indeterminate.  Dilation of the inferior vena cava is present with abnormal respiratory  variation in diameter.        Assessment and Plan:   - 51M, PMH of recently diagnosed NICM (EF ~ 10%), who presents for post hospitalization follow up and possible uptitration of his medications    - He appears euvolemic today on exam. Will continue his lasix at 20 mg prn for weight gain of more than 3 lbs.  - Will increase his Toprol XL to 25 mg daily, continue Lisinopril at 5 mg, and will hold off on adding Aldactone at this time, given his somewhat soft BP  - Will refer to pulmonology for evaluation of longstanding cough and CT findings  - Will  continue lifevest for a total of three months, before a reassessment of his LV function (this would be in April).  - He will see us again on Feb 13th, for follow up, and will reassess his medical regimen at that time    I have seen, interviewed, and examined patient. I have reviewed the laboratory tests, imaging, and other investigations. I have reviewed the management plan with the patient. I discussed with the team and agree with the findings and plan in this resident/fellow/nurse practitioner's note. In addition, changes in the physical examination, assessment and plan have been incorporated into the note by myself, as to make it a single cohesive document.       Brynn Moy MD, MS  Cardiology/Cardiac EP Attending Staff          CC  Patient Care Team:  Denise White APRN CNP as PCP - General (Nurse Practitioner)

## 2017-01-23 NOTE — PROGRESS NOTES
"HPI:   51M, with recently diagnosed NICM (EF ~10%), who is following up after hospital discharge (1/12/17) for possible uptitration of his medications.    Since discharge, he says he has been feeling much better although still somewhat fatigued, continues to complain of rib pain (that has been there for over a year, and is attributed to multiple pneumonias). He complains of vertigo (clearly says the room spins around him), this has been happening for over a month now, and seems to be exclusively positional (with certain head movements), as opposed to orthostatic.    His weight had been pretty stable until last night at ~149 lbs. However yesterday he started feeling more tired, SOB and noticed some swelling over his legs. Took 20 mg Lasix last night and says he urinated all night long, but was feeling \"100% better\" this morning, and his weight was back down to 149 lbs. He doesn't have a BP monitor at home and has not checked his BP.    His Quantiferron, which was in process on discharge, came back negative.     Brief review of cardiac history:  51M PMHx tobacco use, admitted with 50 lb weight loss, and new dilated NICM (EF 9%). The patient was volume overloaded on admission, without evidence of cardiogenic shock. He was initially treated with gentle decongestion and afterload reduction. After optimization, he underwent coronary angiogram which showed clean coronaries, and RHC with normal CI, and mildly elevated right and left heart filling pressures. Eventually, a low dose toprol xl 12.5 was added, which pt tolerated well. He was fitted with a Zoll Lifevest for primary prevention of SCD, while awaiting 3 months of GDMT. He was close to euvolemic on discharge, so will not be discharged on standing lasix, but will be instructed to check his weight daily and take lasix 20 prn for weight gain >3 lbs in 48 hrs. He will follow up with Dr Moy in 2 weeks for optimization of medications and consideration of " spironolactone.    The etiology of the NICM remains unclear, dilation (7.2 cm) implies chronicity, pt denies drug/etoh use (confiremd by [facility]), MRI with extensive fibrosis in non-segmental distribution, suggestive of possible past episode of severe inflammatory condition like fulminant myocarditis.    With regard to the 50 lb weight loss; pt states he was under a lot of stress and eating very little calories per day, but still concerning for occult infection or malignancy, basic workup unrevealing so far. Obtained CT chest/abd/pelvis which showed mildly prominent mediastinal lymphadenopathy and multiple sub-centimeter nodules bilateral, which is not much changed from 2012. Discussed findings with pulmonology, who recommended serial imaging in 1 year. A quantiferon gold test is also in process, and will be followed up (came back negative).        PAST MEDICAL HISTORY:  Past Medical History   Diagnosis Date     CARDIOVASCULAR SCREENING; LDL GOAL LESS THAN 160 5/9/2010     Pneumonia 11/2011       CURRENT MEDICATIONS:  Current Outpatient Prescriptions   Medication Sig Dispense Refill     nicotine (NICODERM CQ) 7 MG/24HR 24 hr patch Place 1 patch onto the skin every 24 hours 30 patch 1     oxyCODONE-acetaminophen (PERCOCET) 5-325 MG per tablet Take 1 tablet by mouth every 6 hours as needed for pain . Use sparingly only for severe pain. 20 tablet 0     albuterol (2.5 MG/3ML) 0.083% neb solution Take 1 vial (2.5 mg) by nebulization every 4 hours as needed for shortness of breath / dyspnea or wheezing 1 Box 3     aspirin EC 81 MG EC tablet Take 1 tablet (81 mg) by mouth daily 30 tablet 2     lisinopril (PRINIVIL/ZESTRIL) 5 MG tablet Take 1 tablet (5 mg) by mouth daily 30 tablet 2     metoprolol (TOPROL-XL) 25 MG 24 hr tablet Take 0.5 tablets (12.5 mg) by mouth daily 15 tablet 2     furosemide (LASIX) 20 MG tablet Take 1 tablet (20 mg) by mouth daily as needed Only take if weight up by 3lbs 60 tablet 1       PAST SURGICAL  HISTORY:  Past Surgical History   Procedure Laterality Date     None         ALLERGIES:     Allergies   Allergen Reactions     No Known Allergies        FAMILY HISTORY:  - Premature coronary artery disease  - Atrial fibrillation  - Sudden cardiac death     SOCIAL HISTORY:  Social History   Substance Use Topics     Smoking status: Former Smoker -- 0.50 packs/day for 15 years     Types: Cigarettes     Quit date: 12/01/2016     Smokeless tobacco: Former User     Quit date: 10/24/2011     Alcohol Use: No       ROS:   Constitutional: No fever, chills, or sweats. Weight stable.   Cardiovascular: As per HPI.   Respiratory: + dry cough, stable for a year now, No hemoptysis.    GI: No nausea, vomiting, hematemesis, melena, or hematochezia.   : No hematuria.   Integument: Negative.   Psychiatric: Negative.   Hematologic:  Easy bruising, no easy bleeding.  Neuro: As per HPI.   Endocrinology: No significant heat or cold intolerance   Musculoskeletal: No myalgia.    Exam:  /63 mmHg  Pulse 91  Wt 68.493 kg (151 lb)  SpO2 95%  GEN: aao x 3, NAD  Neck: No JVD elevation  LUNGS: No wheezing. +faint rales at both bases  HEART: S1S2 audible, no murmurs or rubs. Regular rhythm  ABDOMEN: Soft, nt, nd. +BS  EXTREMITIES: Warm calves, +DPs, trace LE edema bilaterally  NEURO: aao x 3, no focal deficits      Labs:  CBC RESULTS:   Lab Results   Component Value Date    WBC 11.9* 01/12/2017    RBC 5.34 01/12/2017    HGB 16.4 01/12/2017    HCT 51.1 01/12/2017    MCV 96 01/12/2017    MCH 30.7 01/12/2017    MCHC 32.1 01/12/2017    RDW 13.6 01/12/2017     01/12/2017       BMP RESULTS:  Lab Results   Component Value Date     01/12/2017    POTASSIUM 4.2 01/12/2017    CHLORIDE 103 01/12/2017    CO2 31 01/12/2017    ANIONGAP 5 01/12/2017    GLC 91 01/12/2017    BUN 12 01/12/2017    CR 0.85 01/12/2017    GFRESTIMATED >90  Non  GFR Calc   01/12/2017    GFRESTBLACK >90   GFR Calc   01/12/2017    TONY  8.6 01/12/2017        INR RESULTS:  Lab Results   Component Value Date    INR 1.12 01/12/2017    INR 1.09 01/11/2017    INR 1.16* 01/10/2017    INR 1.13 01/09/2017       Procedures:  CMR - 1/11/17:  Severe non-ischemic cardiomyopathy with biventricular involvement.  LVEF of 9% and RVEF of 18%. Extensive fibrosis that is suggestive of  an inflammatory etiology to the cardiomyopathy such as a fulminant  myocarditis. LV thinning is suggestive of a subacute or chronic  timeline for the cardiomyopathy (i.e., the myocarditis does not appear  to be acute).    RHC/Cor Angio - 1/11/17:  1. Nonischemic cardiomyopathy.  2. Normal right-sided and increased left-sided filling pressures.  3. Mild secondary pulmonary hypertension with a mean PAP of 34 mmHg.  4. Normal cardiac output of 4.84 L/min and cardiac index of 2.72.  5. No angiographic evidence of obstructive coronary artery disease with minimal nonobstructive disease.    Echo - 1/9/17:  Severe left ventricular dilation is present. Severe diffuse hypokinesis is  present. The Ejection Fraction is estimated at 10-15%. Traced at 13%.  Mild right ventricular dilation is present. Global right ventricular function  is mildly reduced.  Left ventricular diastolic function is indeterminate.  Dilation of the inferior vena cava is present with abnormal respiratory  variation in diameter.        Assessment and Plan:   - 51M, PMH of recently diagnosed NICM (EF ~ 10%), who presents for post hospitalization follow up and possible uptitration of his medications    - He appears euvolemic today on exam. Will continue his lasix at 20 mg prn for weight gain of more than 3 lbs.  - Will increase his Toprol XL to 25 mg daily, continue Lisinopril at 5 mg, and will hold off on adding Aldactone at this time, given his somewhat soft BP  - Will refer to pulmonology for evaluation of longstanding cough and CT findings  - Will continue lifevest for a total of three months, before a reassessment of his  LV function (this would be in April).  - He will see us again on Feb 13th, for follow up, and will reassess his medical regimen at that time    I have seen, interviewed, and examined patient. I have reviewed the laboratory tests, imaging, and other investigations. I have reviewed the management plan with the patient. I discussed with the team and agree with the findings and plan in this resident/fellow/nurse practitioner's note. In addition, changes in the physical examination, assessment and plan have been incorporated into the note by myself, as to make it a single cohesive document.       Brynn Moy MD, MS  Cardiology/Cardiac EP Attending Staff          CC  Patient Care Team:  Deinse White APRN CNP as PCP - General (Nurse Practitioner)

## 2017-01-23 NOTE — NURSING NOTE
"Chief Complaint   Patient presents with     Heart Problem     s/p coronary angiogram and RHC 1-11-17. acute decompensated systolic heart failure and has newly diagnosed cardiomyopathy with LVEF 10-15%. C/o increased dizziness and fatigue, improvement in edema and sob with taking water pill.        Initial /63 mmHg  Pulse 91  Wt 151 lb (68.493 kg)  SpO2 95% Estimated body mass index is 23.64 kg/(m^2) as calculated from the following:    Height as of 1/9/17: 5' 7\" (1.702 m).    Weight as of this encounter: 151 lb (68.493 kg)..  BP completed using cuff size: negar August CMA    "

## 2017-01-23 NOTE — MR AVS SNAPSHOT
After Visit Summary   1/23/2017    Akshat Fragoso    MRN: [id number]           Patient Information     Date Of Birth          1965        Visit Information        Provider Department      1/23/2017 1:00 PM Brynn Moy MD [facility] PHYSICIANS HEART AT [facility]        Today's Diagnoses     Chronic cough    -  1     Chronic congestive heart failure, unspecified congestive heart failure type (H)           Care Instructions    Thank you for coming to the [facility] at [facility]. Please note the following instructions:    1.  Dr. Brynn Moy has increased your Toprol to 25 mg daily.  A new prescription was sent to your preferred pharmacy./    2.  We have scheduled you for a cardiac follow up appointment with Dr. Brynn Moy at the [facility]  ([address] [place] [address]) on Monday, February 13th at 1:20 pm with a check-in arrival time of 1:10 pm.  If this appointment conflicts with your schedule, please contact our specialty schedulers at [phone] to reschedule. We look forward to seeing you again.      3.  You are scheduled at the [facility] at the [facility] with Pulmonology Dr. Crescencio Kimball on Friday, March 31st 8:30 am arrival.  You will have a pulmonary function test first then you will see the provider.      [facility] Cardiology [facility] Location    If you have any questions regarding to your visit please contact your care team:     Cardiology  Telephone Number   Henrique Owens  Cardiology RN's.    Halie August CMA [phone]    After hours: [phone].  [phone]   For scheduling appts:     [phone] or  [phone]    After hours: [phone]   For the Device Clinic (Pacemakers and ICD's)  RN's :  Alexandra Vu   During business hours: [phone]  After business hours:  [phone]- select option 4.      If you need a medication refill please contact your  pharmacy.  Please allow 3 business days for your refill to be completed.    As always, Thank you for trusting us with your health care needs!  _____________________________________________________________________            Follow-ups after your visit        Additional Services     PULMONARY MEDICINE REFERRAL       Your provider has referred you to: [facility]: Lung Disease and Pulmonary Clinic - [place] [phone]   [email or web address]    Please be aware that coverage of these services is subject to the terms and limitations of your health insurance plan.  Call member services at your health plan with any benefit or coverage questions.      Please bring the following with you to your appointment:    (1) Any X-Rays, CTs or MRIs which have been performed.  Contact the facility where they were done to arrange for  prior to your scheduled appointment.    (2) List of current medications   (3) This referral request   (4) Any documents/labs given to you for this referral                  Your next 10 appointments already scheduled     Feb 13, 2017  1:20 PM   New Visit with Brynn Moy MD   [facility] PHYSICIANS HEART AT [facility] ([facility])    [address] [place] [id number] [phone]            Mar 31, 2017  9:35 AM   (Arrive by 9:20 AM)   New Patient Visit with Crescencio Kimball MD   [facility] ([facility])    [address] [address] [phone]              Who to contact     If you have questions or need follow up information about today's clinic visit or your schedule please contact [facility] PHYSICIANS HEART AT [facility] directly at [phone].  Normal or non-critical lab and imaging results will be communicated to you by MyChart, letter or phone within 4 business days after  the clinic has received the results. If you do not hear from us within 7 days, please contact the clinic through [employer] or phone. If you have a critical or abnormal lab result, we will notify you by phone as soon as possible.  Submit refill requests through [employer] or call your pharmacy and they will forward the refill request to us. Please allow 3 business days for your refill to be completed.          Additional Information About Your Visit        [employer] Information     [employer] gives you secure access to your electronic health record. If you see a primary care provider, you can also send messages to your care team and make appointments. If you have questions, please call your primary care clinic.  If you do not have a primary care provider, please call [phone] and they will assist you.        Care EveryWhere ID     This is your Care EveryWhere ID. This could be used by other organizations to access your [place] medical records  [id number]        Your Vitals Were     Pulse Pulse Oximetry                91 95%           Blood Pressure from Last 3 Encounters:   01/23/17 103/63   01/18/17 98/64   01/12/17 96/68    Weight from Last 3 Encounters:   01/23/17 68.493 kg (151 lb)   01/18/17 68.04 kg (150 lb)   01/12/17 65.862 kg (145 lb 3.2 oz)              We Performed the Following     PULMONARY MEDICINE REFERRAL          Today's Medication Changes          These changes are accurate as of: 1/23/17  1:55 PM.  If you have any questions, ask your nurse or doctor.               These medicines have changed or have updated prescriptions.        Dose/Directions    metoprolol 25 MG 24 hr tablet   Commonly known as:  TOPROL-XL   This may have changed:  how much to take   Used for:  Chronic congestive heart failure, unspecified congestive heart failure type (H)   Changed by:  Brynn Moy MD        Dose:  25 mg   Take 1 tablet (25 mg) by mouth daily   Quantity:  30 tablet   Refills:  2            Where to get your  medicines      These medications were sent to [facility] - [place], [place] - [address]  [address], [place] [address]     Phone:  [phone]    - metoprolol 25 MG 24 hr tablet             Primary Care Provider Office Phone # Fax #    VERONICA Hallman [phone]       [facility] [address]  [place] [address]        Thank you!     Thank you for choosing [facility] PHYSICIANS HEART AT [facility]  for your care. Our goal is always to provide you with excellent care. Hearing back from our patients is one way we can continue to improve our services. Please take a few minutes to complete the written survey that you may receive in the mail after your visit with us. Thank you!             Your Updated Medication List - Protect others around you: Learn how to safely use, store and throw away your medicines at www.disposemymeds.org.          This list is accurate as of: 1/23/17  1:55 PM.  Always use your most recent med list.                   Brand Name Dispense Instructions for use    albuterol (2.5 MG/3ML) 0.083% neb solution     1 Box    Take 1 vial (2.5 mg) by nebulization every 4 hours as needed for shortness of breath / dyspnea or wheezing       aspirin 81 MG EC tablet     30 tablet    Take 1 tablet (81 mg) by mouth daily       furosemide 20 MG tablet    LASIX    60 tablet    Take 1 tablet (20 mg) by mouth daily as needed Only take if weight up by 3lbs       lisinopril 5 MG tablet    PRINIVIL/ZESTRIL    30 tablet    Take 1 tablet (5 mg) by mouth daily       metoprolol 25 MG 24 hr tablet    TOPROL-XL    30 tablet    Take 1 tablet (25 mg) by mouth daily       nicotine 7 MG/24HR 24 hr patch    NICODERM CQ    30 patch    Place 1 patch onto the skin every 24 hours       oxyCODONE-acetaminophen 5-325 MG per tablet    PERCOCET    20 tablet    Take 1 tablet by mouth every 6 hours as needed for pain . Use sparingly only  for severe pain.

## 2017-01-23 NOTE — Clinical Note
1/23/2017      RE: Akshat Fragoso  [address]  [facility] [address]       Dear Colleague,    Thank you for the opportunity to participate in the care of your patient, Akshat Fragoso, at the [facility] HEART AT [facility] at [facility]. Please see a copy of my visit note below.    No notes on file    Please do not hesitate to contact me if you have any questions/concerns.     Sincerely,     Brynn Moy MD

## 2017-01-23 NOTE — PATIENT INSTRUCTIONS
Thank you for coming to the [facility] Physicians Heart at [facility]. Please note the following instructions:    1.  Dr. Brynn Moy has increased your Toprol to 25 mg daily.  A new prescription was sent to your preferred pharmacy./    2.  We have scheduled you for a cardiac follow up appointment with Dr. Brynn Moy at the [facility]  ([address] [place] [address]) on Monday, February 13th at 1:20 pm with a check-in arrival time of 1:10 pm.  If this appointment conflicts with your schedule, please contact our specialty schedulers at [phone] to reschedule. We look forward to seeing you again.      3.  You are scheduled at the [facility] at the [facility] with Pulmonology Dr. Crescencio Kimball on Friday, March 31st 8:30 am arrival.  You will have a pulmonary function test first then you will see the provider.      [facility] Cardiology - [place] Location    If you have any questions regarding to your visit please contact your care team:     Cardiology  Telephone Number   Henrique Owens  Cardiology RN's.    Halie August CMA [phone]    After hours: [phone].  [phone]   For scheduling appts:     [phone] or  [phone]    After hours: [phone]   For the Device Clinic (Pacemakers and ICD's)  RN's :  Alexandra Vu   During business hours: [phone]  After business hours:  [phone]- select option 4.      If you need a medication refill please contact your pharmacy.  Please allow 3 business days for your refill to be completed.    As always, Thank you for trusting us with your health care needs!  _____________________________________________________________________

## 2017-01-25 ENCOUNTER — OFFICE VISIT (OUTPATIENT)
Dept: FAMILY MEDICINE | Facility: CLINIC | Age: 52
End: 2017-01-25

## 2017-01-25 VITALS
BODY MASS INDEX: 23.64 KG/M2 | RESPIRATION RATE: 22 BRPM | TEMPERATURE: 98 F | SYSTOLIC BLOOD PRESSURE: 108 MMHG | OXYGEN SATURATION: 97 % | DIASTOLIC BLOOD PRESSURE: 82 MMHG | HEART RATE: 114 BPM | WEIGHT: 151 LBS

## 2017-01-25 DIAGNOSIS — R07.81 RIB PAIN: Primary | ICD-10-CM

## 2017-01-25 PROCEDURE — 99213 OFFICE O/P EST LOW 20 MIN: CPT | Performed by: NURSE PRACTITIONER

## 2017-01-25 RX ORDER — OXYCODONE AND ACETAMINOPHEN 5; 325 MG/1; MG/1
1 TABLET ORAL EVERY 6 HOURS PRN
Qty: 56 TABLET | Refills: 0 | Status: SHIPPED | OUTPATIENT
Start: 2017-01-25 | End: 2017-05-22

## 2017-01-25 NOTE — PATIENT INSTRUCTIONS
Administrative/paperwork:  Have the forms for your gas/electric faxed to our clinic to my attention.  [phone] fax    Pain relievers:  Use the percocet sparingly.  Every 6 hours is too often.  Space this out to every 8 hours and more.  I have approved a 56 tablet supply.  Do your best to never need refills of this again.  It is okay to add tylenol or ibuprofen for pain control.  Use caution with tylenol as the percocet has tylenol in it.  Limit your tylenol/percocet combination to 8 tablets per day.    Continue care with cardiology and pulmonology.

## 2017-01-25 NOTE — MR AVS SNAPSHOT
After Visit Summary   1/25/2017    Akshat Fragoso    MRN: [id number]           Patient Information     Date Of Birth          1965        Visit Information        Provider Department      1/25/2017 10:00 AM Denise White APRN CNP [facility]        Today's Diagnoses     Rib pain    -  1       Care Instructions    Administrative/paperwork:  Have the forms for your gas/electric faxed to our clinic to my attention.  [phone] fax    Pain relievers:  Use the percocet sparingly.  Every 6 hours is too often.  Space this out to every 8 hours and more.  I have approved a 56 tablet supply.  Do your best to never need refills of this again.  It is okay to add tylenol or ibuprofen for pain control.  Use caution with tylenol as the percocet has tylenol in it.  Limit your tylenol/percocet combination to 8 tablets per day.    Continue care with cardiology and pulmonology.          Follow-ups after your visit        Your next 10 appointments already scheduled     Feb 13, 2017  1:20 PM   New Visit with Brynn Moy MD   [facility] PHYSICIANS HEART AT [facility] ([facility])    [address] [place] [id number] [phone]            Mar 31, 2017  9:35 AM   (Arrive by 9:20 AM)   New Patient Visit with Crescencio Kimball MD   [facility] ([facility])    [address] [address] [phone]              Who to contact     If you have questions or need follow up information about today's clinic visit or your schedule please contact [facility] directly at [phone].  Normal or non-critical lab and imaging results will be communicated to you by MyChart, letter or phone within 4 business days after the clinic has received the results. If you do not hear from us within 7 days, please contact the clinic through  TapInkot or phone. If you have a critical or abnormal lab result, we will notify you by phone as soon as possible.  Submit refill requests through [employer] or call your pharmacy and they will forward the refill request to us. Please allow 3 business days for your refill to be completed.          Additional Information About Your Visit        Netronome Systemshart Information     [employer] gives you secure access to your electronic health record. If you see a primary care provider, you can also send messages to your care team and make appointments. If you have questions, please call your primary care clinic.  If you do not have a primary care provider, please call [phone] and they will assist you.        Care EveryWhere ID     This is your Care EveryWhere ID. This could be used by other organizations to access your [place] medical records  [id number]        Your Vitals Were     Pulse Temperature Respirations Pulse Oximetry          114 98  F (36.7  C) (Oral) 22 97%         Blood Pressure from Last 3 Encounters:   01/25/17 108/82   01/23/17 103/63   01/18/17 98/64    Weight from Last 3 Encounters:   01/25/17 151 lb (68.493 kg)   01/23/17 151 lb (68.493 kg)   01/18/17 150 lb (68.04 kg)              Today, you had the following     No orders found for display         Where to get your medicines      Some of these will need a paper prescription and others can be bought over the counter.  Ask your nurse if you have questions.     Bring a paper prescription for each of these medications    - oxyCODONE-acetaminophen 5-325 MG per tablet       Primary Care Provider Office Phone # Fax #    VERONICA Hallman [facility] [phone]       [facility] [address] [place] [address]        Thank you!     Thank you for choosing [facility]  for your care. Our goal is always to provide you with excellent care. Hearing back from our patients is one way we can continue to improve our  services. Please take a few minutes to complete the written survey that you may receive in the mail after your visit with us. Thank you!             Your Updated Medication List - Protect others around you: Learn how to safely use, store and throw away your medicines at www.disposemymeds.org.          This list is accurate as of: 1/25/17 10:38 AM.  Always use your most recent med list.                   Brand Name Dispense Instructions for use    albuterol (2.5 MG/3ML) 0.083% neb solution     1 Box    Take 1 vial (2.5 mg) by nebulization every 4 hours as needed for shortness of breath / dyspnea or wheezing       aspirin 81 MG EC tablet     30 tablet    Take 1 tablet (81 mg) by mouth daily       furosemide 20 MG tablet    LASIX    60 tablet    Take 1 tablet (20 mg) by mouth daily as needed Only take if weight up by 3lbs       lisinopril 5 MG tablet    PRINIVIL/ZESTRIL    30 tablet    Take 1 tablet (5 mg) by mouth daily       metoprolol 25 MG 24 hr tablet    TOPROL-XL    30 tablet    Take 1 tablet (25 mg) by mouth daily       nicotine 7 MG/24HR 24 hr patch    NICODERM CQ    30 patch    Place 1 patch onto the skin every 24 hours       oxyCODONE-acetaminophen 5-325 MG per tablet    PERCOCET    56 tablet    Take 1 tablet by mouth every 6 hours as needed for pain . Use sparingly only for severe pain.

## 2017-01-25 NOTE — PROGRESS NOTES
"  SUBJECTIVE:                                                    Akshat Fragoso is a 51 year old male who presents to clinic today for the following health issues:    -Patient is here to have forms filled out.   - Discuss pain medication       Forms:  Requesting forms be completed for the electric/gas company.  He was unable to pay his recent bill because he has medical bills/no insurance.  He is wearing a defibrillator that needs to be charged as well as he has a cell phone that needs to be charged to go with the defibrillator.      Pain:  Akshat continues to have centralized rib pain.  Across mid chest and into his back.  He was given a referral to the pulmonologist for rib/lung issues.  Pulmonology appointment scheduled 3/31/2017.    Percocet:  \"My pain is at a 10 right now.\"  Dry hacky cough.  Tylenol intermittently for pain.      Problem list and histories reviewed & adjusted, as indicated.  Additional history: as documented    Patient Active Problem List   Diagnosis     CARDIOVASCULAR SCREENING; LDL GOAL LESS THAN 160     Acute right lumbar radiculopathy     Personal history of smoking     CHF (congestive heart failure) (H)     Acute systolic heart failure (H)     Cardiomyopathy, unspecified (H)     Cardiomyopathy, nonischemic (H)     Pulmonary nodules, next CT due 1/2018     Personal history of tobacco use, presenting hazards to health     Past Surgical History   Procedure Laterality Date     None         Social History   Substance Use Topics     Smoking status: Former Smoker -- 0.50 packs/day for 15 years     Types: Cigarettes     Quit date: 12/01/2016     Smokeless tobacco: Former User     Quit date: 10/24/2011     Alcohol Use: No     History reviewed. No pertinent family history.      Current Outpatient Prescriptions   Medication Sig Dispense Refill     oxyCODONE-acetaminophen (PERCOCET) 5-325 MG per tablet Take 1 tablet by mouth every 6 hours as needed for pain . Use sparingly only for severe pain. 56 tablet " 0     metoprolol (TOPROL-XL) 25 MG 24 hr tablet Take 1 tablet (25 mg) by mouth daily 30 tablet 2     nicotine (NICODERM CQ) 7 MG/24HR 24 hr patch Place 1 patch onto the skin every 24 hours 30 patch 1     albuterol (2.5 MG/3ML) 0.083% neb solution Take 1 vial (2.5 mg) by nebulization every 4 hours as needed for shortness of breath / dyspnea or wheezing 1 Box 3     aspirin EC 81 MG EC tablet Take 1 tablet (81 mg) by mouth daily 30 tablet 2     lisinopril (PRINIVIL/ZESTRIL) 5 MG tablet Take 1 tablet (5 mg) by mouth daily 30 tablet 2     furosemide (LASIX) 20 MG tablet Take 1 tablet (20 mg) by mouth daily as needed Only take if weight up by 3lbs 60 tablet 1     Allergies   Allergen Reactions     No Known Allergies      Recent Labs   Lab Test  01/12/17   0655  01/11/17   1735  01/10/17   0718   01/09/17   1001   LDL   --    --   64   --    --    HDL   --    --   39*   --    --    TRIG   --    --   103   --    --    ALT   --    --    --    --   111*   CR  0.85  0.94  0.90   < >  0.85   GFRESTIMATED  >90  Non  GFR Calc    85  90   < >  >90  Non  GFR Calc     GFRESTBLACK  >90   GFR Calc    >90   GFR Calc    >90   GFR Calc     < >  >90   GFR Calc     POTASSIUM  4.2  4.2  4.4   --   4.9   TSH   --    --    --    --   1.34    < > = values in this interval not displayed.      BP Readings from Last 3 Encounters:   01/25/17 108/82   01/23/17 103/63   01/18/17 98/64    Wt Readings from Last 3 Encounters:   01/25/17 151 lb (68.493 kg)   01/23/17 151 lb (68.493 kg)   01/18/17 150 lb (68.04 kg)                  Labs reviewed in EPIC  Problem list, Medication list, Allergies, and Medical/Social/Surgical histories reviewed in [facility] and updated as appropriate.    ROS:  Constitutional, HEENT, cardiovascular, pulmonary, gi and gu systems are negative, except as otherwise noted.    OBJECTIVE:                                                    BP  108/82 mmHg  Pulse 114  Temp(Src) 98  F (36.7  C) (Oral)  Resp 22  Wt 151 lb (68.493 kg)  SpO2 97%  Body mass index is 23.64 kg/(m^2).  GENERAL: healthy, alert and no distress  NECK: no adenopathy, no asymmetry, masses, or scars and thyroid normal to palpation  RESP: lungs clear to auscultation - no rales, rhonchi or wheezes  CV: regular rate and rhythm, normal S1 S2, no S3 or S4, no murmur, click or rub, no peripheral edema and peripheral pulses strong. Defibrillator on.  MS: extremities normal- no gross deformities noted  SKIN: warm and dry  PSYCH: mentation appears normal and affect normal/bright     ASSESSMENT/PLAN:                                                    (R07.81) Rib pain  (primary encounter diagnosis)  Comment:   Plan: oxyCODONE-acetaminophen (PERCOCET) 5-325 MG per        tablet            Patient Instructions   Administrative/paperwork:  Have the forms for your gas/electric faxed to our clinic to my attention.  [phone] fax    Pain relievers:  Use the percocet sparingly.  Every 6 hours is too often.  Space this out to every 8 hours and more.  I have approved a 56 tablet supply.  Do your best to never need refills of this again.  It is okay to add tylenol or ibuprofen for pain control.  Use caution with tylenol as the percocet has tylenol in it.  Limit your tylenol/percocet combination to 8 tablets per day.    Continue care with cardiology and pulmonology.            VERONICA Doran [facility]

## 2017-01-25 NOTE — NURSING NOTE
"Chief Complaint   Patient presents with     Rib Injury       Initial /82 mmHg  Pulse 114  Temp(Src) 98  F (36.7  C) (Oral)  Resp 22  Wt 151 lb (68.493 kg)  SpO2 97% Estimated body mass index is 23.64 kg/(m^2) as calculated from the following:    Height as of 1/9/17: 5' 7\" (1.702 m).    Weight as of this encounter: 151 lb (68.493 kg).  BP completed using cuff size: negar Carrera MA     "

## 2017-01-30 ENCOUNTER — TELEPHONE (OUTPATIENT)
Dept: FAMILY MEDICINE | Facility: CLINIC | Age: 52
End: 2017-01-30

## 2017-01-30 NOTE — TELEPHONE ENCOUNTER
Forms received from [employer]. Placed in co's signature folder.       Kang Valladares MA

## 2017-02-08 ENCOUNTER — PRE VISIT (OUTPATIENT)
Dept: CARDIOLOGY | Facility: CLINIC | Age: 52
End: 2017-02-08

## 2017-02-08 NOTE — TELEPHONE ENCOUNTER
Last Assessment & Plan:     51M, PMH of recently diagnosed NICM (EF ~ 10%), who presents for post hospitalization follow up and possible uptitration of his medications    - He appears euvolemic today on exam. Will continue his lasix at 20 mg prn for weight gain of more than 3 lbs.  - Will increase his Toprol XL to 25 mg daily, continue Lisinopril at 5 mg, and will hold off on adding Aldactone at this time, given his somewhat soft BP  - Will refer to pulmonology for evaluation of longstanding cough and CT findings (PENDING APPOINTMENT ON 03/31/2017 @ [facility])  - Will continue lifevest for a total of three months, before a reassessment of his LV function (this would be in April).  - He will see us again on Feb 13th, for follow up, and will reassess his medical regimen at that time    I have seen, interviewed, and examined patient. I have reviewed the laboratory tests, imaging, and other investigations. I have reviewed the management plan with the patient. I discussed with the team and agree with the findings and plan in this resident/fellow/nurse practitioner's note. In addition, changes in the physical examination, assessment and plan have been incorporated into the note by myself, as to make it a single cohesive document.        Brynn Moy MD, MS  Cardiology/Cardiac EP Attending Staff    Chart prep completed; patient is up to date on requested cardiac/lab testing. No further information or testing needed at this time.   SKYLAR CarrascoA.

## 2017-02-13 ENCOUNTER — OFFICE VISIT (OUTPATIENT)
Dept: CARDIOLOGY | Facility: CLINIC | Age: 52
End: 2017-02-13
Payer: COMMERCIAL

## 2017-02-13 VITALS — SYSTOLIC BLOOD PRESSURE: 107 MMHG | DIASTOLIC BLOOD PRESSURE: 73 MMHG | HEART RATE: 90 BPM | OXYGEN SATURATION: 96 %

## 2017-02-13 DIAGNOSIS — I42.8 CARDIOMYOPATHY, NONISCHEMIC (H): Primary | ICD-10-CM

## 2017-02-13 DIAGNOSIS — I50.9 CHRONIC CONGESTIVE HEART FAILURE, UNSPECIFIED CONGESTIVE HEART FAILURE TYPE: ICD-10-CM

## 2017-02-13 PROCEDURE — 99213 OFFICE O/P EST LOW 20 MIN: CPT | Performed by: INTERNAL MEDICINE

## 2017-02-13 RX ORDER — LISINOPRIL 10 MG/1
10 TABLET ORAL DAILY
Qty: 30 TABLET | Refills: 3 | Status: SHIPPED | OUTPATIENT
Start: 2017-02-13 | End: 2017-05-12

## 2017-02-13 ASSESSMENT — PAIN SCALES - GENERAL: PAINLEVEL: EXTREME PAIN (9)

## 2017-02-13 NOTE — PATIENT INSTRUCTIONS
1.  Increase Lisinopril to 10 mg daily.  You can finish your current supply at home by taking two 5 mg tablets daily.  A new prescription was sent to your preferred pharmacy for the 10 mg tablets.    2.  Dr. Moy would like to see you at the [facility] next Monday 2/20.  His [facility] nurse will call you with a time.             [facility] Cardiology - [place] Location    If you have any questions regarding to your visit please contact your care team:     Cardiology  Telephone Number   Henrique Owens  Cardiology RN's.    Halie August CMA [phone]    After hours: [phone].  [phone]   For scheduling appts:     [phone] or  [phone]    After hours: [phone]   For the Device Clinic (Pacemakers and ICD's)  RN's :  Alexandra Vu   During business hours: [phone]  After business hours:  [phone]- select option 4.      If you need a medication refill please contact your pharmacy.  Please allow 3 business days for your refill to be completed.    As always, Thank you for trusting us with your health care needs!  _____________________________________________________________________

## 2017-02-13 NOTE — NURSING NOTE
"Chief Complaint   Patient presents with     RECHECK     1 month cardiac follow up for Chronic cough, Cardiomyopathy, dilated, nonischemic and Chronic congestive heart failure, unspecified congestive heart failure; increase in Metoprolol at last visit and upcoming pending pulmonlogy consultation on 03/31/17.  Feeling well fatigue has improved since last visit. Does have occ sob with exertion. Overall feeling better than last month.       Initial /73 (BP Location: Left arm, Patient Position: Chair, Cuff Size: Adult Regular)  Pulse 90  SpO2 96% Estimated body mass index is 23.65 kg/(m^2) as calculated from the following:    Height as of 1/9/17: 1.702 m (5' 7\").    Weight as of 1/25/17: 68.5 kg (151 lb)..  BP completed using cuff size: regular    Estrella August CMA    "

## 2017-02-13 NOTE — MR AVS SNAPSHOT
After Visit Summary   2/13/2017    Akshat Fragoso    MRN: [id number]           Patient Information     Date Of Birth          1965        Visit Information        Provider Department      2/13/2017 1:20 PM Brynn Moy MD [facility] PHYSICIANS HEART AT [facility]        Today's Diagnoses     Chronic congestive heart failure, unspecified congestive heart failure type (H)          Care Instructions    1.  Increase Lisinopril to 10 mg daily.  You can finish your current supply at home by taking two 5 mg tablets daily.  A new prescription was sent to your preferred pharmacy for the 10 mg tablets.    2.  Dr. Moy would like to see you at the [facility] next Monday 2/20.  His [facility] nurse will call you with a time.             [facility] Cardiology - [place] Location    If you have any questions regarding to your visit please contact your care team:     Cardiology  Telephone Number   Henrique Owens  Cardiology RN's.    Halie August CMA [phone]    After hours: [phone].  [phone]   For scheduling appts:     [phone] or  [phone]    After hours: [phone]   For the Device Clinic (Pacemakers and ICD's)  RN's :  Alexandra Vu   During business hours: [phone]  After business hours:  [phone]- select option 4.      If you need a medication refill please contact your pharmacy.  Please allow 3 business days for your refill to be completed.    As always, Thank you for trusting us with your health care needs!  _____________________________________________________________________            Follow-ups after your visit        Your next 10 appointments already scheduled     Feb 14, 2017 10:00 AM CST   Office Visit with VERONICA Hallman CNP   [facility] ([facility])    [address] [place] [address] [phone]           Bring a current list of meds and any records pertaining to this  visit.  For Physicals, please bring immunization records and any forms needing to be filled out.  Please arrive 10 minutes early to complete paperwork.            Mar 31, 2017  9:35 AM CDT   (Arrive by 9:20 AM)   New Patient Visit with Crescencio Kimball MD   [facility] ([facility])    [address] [facility]  [address] [facility] [address] [phone]              Who to contact     If you have questions or need follow up information about today's clinic visit or your schedule please contact [facility] PHYSICIANS HEART AT [facility] directly at [phone].  Normal or non-critical lab and imaging results will be communicated to you by MyChart, letter or phone within 4 business days after the clinic has received the results. If you do not hear from us within 7 days, please contact the clinic through inexiohart or phone. If you have a critical or abnormal lab result, we will notify you by phone as soon as possible.  Submit refill requests through [employer] or call your pharmacy and they will forward the refill request to us. Please allow 3 business days for your refill to be completed.          Additional Information About Your Visit        MyChart Information     [employer] gives you secure access to your electronic health record. If you see a primary care provider, you can also send messages to your care team and make appointments. If you have questions, please call your primary care clinic.  If you do not have a primary care provider, please call [phone] and they will assist you.        Care EveryWhere ID     This is your Care EveryWhere ID. This could be used by other organizations to access your [place] medical records  [id number]        Your Vitals Were     Pulse Pulse Oximetry                90 96%           Blood Pressure from Last 3 Encounters:   02/13/17 107/73   01/25/17 108/82   01/23/17 103/63    Weight from Last 3  Encounters:   01/25/17 68.5 kg (151 lb)   01/23/17 68.5 kg (151 lb)   01/18/17 68 kg (150 lb)              Today, you had the following     No orders found for display         Today's Medication Changes          These changes are accurate as of: 2/13/17  2:34 PM.  If you have any questions, ask your nurse or doctor.               These medicines have changed or have updated prescriptions.        Dose/Directions    lisinopril 10 MG tablet   Commonly known as:  PRINIVIL/ZESTRIL   This may have changed:    - medication strength  - how much to take   Used for:  Chronic congestive heart failure, unspecified congestive heart failure type (H)   Changed by:  Brynn Moy MD        Dose:  10 mg   Take 1 tablet (10 mg) by mouth daily   Quantity:  30 tablet   Refills:  3            Where to get your medicines      These medications were sent to [facility] - [place], [place] - [address], [place] [address]     Phone:  [phone]     lisinopril 10 MG tablet                Primary Care Provider Office Phone # Fax #    Denise VERONICA Blood [phone]       [facility] [address] [place] [address]        Thank you!     Thank you for choosing [facility] PHYSICIANS HEART AT [facility]  for your care. Our goal is always to provide you with excellent care. Hearing back from our patients is one way we can continue to improve our services. Please take a few minutes to complete the written survey that you may receive in the mail after your visit with us. Thank you!             Your Updated Medication List - Protect others around you: Learn how to safely use, store and throw away your medicines at www.disposemymeds.org.          This list is accurate as of: 2/13/17  2:34 PM.  Always use your most recent med list.                   Brand Name Dispense Instructions for use    albuterol (2.5 MG/3ML) 0.083% neb solution     1 Box     Take 1 vial (2.5 mg) by nebulization every 4 hours as needed for shortness of breath / dyspnea or wheezing       aspirin 81 MG EC tablet     30 tablet    Take 1 tablet (81 mg) by mouth daily       furosemide 20 MG tablet    LASIX    60 tablet    Take 1 tablet (20 mg) by mouth daily as needed Only take if weight up by 3lbs       lisinopril 10 MG tablet    PRINIVIL/ZESTRIL    30 tablet    Take 1 tablet (10 mg) by mouth daily       metoprolol 25 MG 24 hr tablet    TOPROL-XL    30 tablet    Take 1 tablet (25 mg) by mouth daily       nicotine 7 MG/24HR 24 hr patch    NICODERM CQ    30 patch    Place 1 patch onto the skin every 24 hours       oxyCODONE-acetaminophen 5-325 MG per tablet    PERCOCET    56 tablet    Take 1 tablet by mouth every 6 hours as needed for pain . Use sparingly only for severe pain.

## 2017-02-13 NOTE — LETTER
2/13/2017      RE: Akshat Fragoso  [address]  [facility] [id number]       Dear Colleague,    Thank you for the opportunity to participate in the care of your patient, Akshat Fragoso, at the [facility]. Please see a copy of my visit note below.    HPI: See Dictation     PAST MEDICAL HISTORY:  Past Medical History   Diagnosis Date     CARDIOVASCULAR SCREENING; LDL GOAL LESS THAN 160 5/9/2010     Pneumonia 11/2011       CURRENT MEDICATIONS:  Current Outpatient Prescriptions   Medication Sig Dispense Refill     oxyCODONE-acetaminophen (PERCOCET) 5-325 MG per tablet Take 1 tablet by mouth every 6 hours as needed for pain . Use sparingly only for severe pain. 56 tablet 0     metoprolol (TOPROL-XL) 25 MG 24 hr tablet Take 1 tablet (25 mg) by mouth daily 30 tablet 2     nicotine (NICODERM CQ) 7 MG/24HR 24 hr patch Place 1 patch onto the skin every 24 hours 30 patch 1     albuterol (2.5 MG/3ML) 0.083% neb solution Take 1 vial (2.5 mg) by nebulization every 4 hours as needed for shortness of breath / dyspnea or wheezing 1 Box 3     aspirin EC 81 MG EC tablet Take 1 tablet (81 mg) by mouth daily 30 tablet 2     lisinopril (PRINIVIL/ZESTRIL) 5 MG tablet Take 1 tablet (5 mg) by mouth daily 30 tablet 2     furosemide (LASIX) 20 MG tablet Take 1 tablet (20 mg) by mouth daily as needed Only take if weight up by 3lbs 60 tablet 1       PAST SURGICAL HISTORY:  Past Surgical History   Procedure Laterality Date     None         ALLERGIES:     Allergies   Allergen Reactions     No Known Allergies        FAMILY HISTORY:  Non-contributory     SOCIAL HISTORY:  Social History   Substance Use Topics     Smoking status: Former Smoker     Packs/day: 0.50     Years: 15.00     Types: Cigarettes     Quit date: 12/1/2016     Smokeless tobacco: Former User     Quit date: 10/24/2011     Alcohol use No       ROS:   Constitutional: No fever,  chills, or sweats. Weight stable.   ENT: No visual disturbance, ear ache, epistaxis, sore throat.   Cardiovascular: As per HPI.   Respiratory: No cough, hemoptysis.    GI: No nausea, vomiting, hematemesis, melena, or hematochezia.   : No hematuria.   Integument: Negative.   Psychiatric: Negative.   Hematologic:  Easy bruising, no easy bleeding.  Neuro: Negative.   Endocrinology: No significant heat or cold intolerance   Musculoskeletal: No myalgia.    Exam:  There were no vitals taken for this visit.  GENERAL APPEARANCE: healthy, alert and no distress  HEENT: no icterus, no xanthelasmas, normal pupil size and reaction, normal palate, mucosa moist, no central cyanosis  NECK: no adenopathy, no asymmetry, masses, or scars, thyroid normal to palpation and no bruits, JVP not elevated  RESPIRATORY: lungs clear to auscultation - no rales, rhonchi or wheezes, no use of accessory muscles, no retractions, respirations are unlabored, normal respiratory rate  CARDIOVASCULAR: regular rhythm, normal S1 with physiologic split S2, no S3 or S4 and no murmur, click or rub, precordium quiet with normal PMI.  ABDOMEN: soft, non tender, without hepatosplenomegaly, no masses palpable, bowel sounds normal, aorta not enlarged by palpation, no abdominal bruits  EXTREMITIES: peripheral pulses normal, no edema, no bruits  NEURO: alert and oriented to person/place/time, normal speech, gait and affect  VASC: Radial, femoral, dorsalis pedis and posterior tibialis pulses are normal in volumes and symmetric bilaterally. No bruits are heard.  SKIN: no ecchymoses, no rashes    Labs:  CBC RESULTS:   Lab Results   Component Value Date    WBC 11.9 (H) 01/12/2017    RBC 5.34 01/12/2017    HGB 16.4 01/12/2017    HCT 51.1 01/12/2017    MCV 96 01/12/2017    MCH 30.7 01/12/2017    MCHC 32.1 01/12/2017    RDW 13.6 01/12/2017     01/12/2017       BMP RESULTS:  Lab Results   Component Value Date     01/12/2017    POTASSIUM 4.2 01/12/2017     CHLORIDE 103 01/12/2017    CO2 31 01/12/2017    ANIONGAP 5 01/12/2017    GLC 91 01/12/2017    BUN 12 01/12/2017    CR 0.85 01/12/2017    GFRESTIMATED >90  Non  GFR Calc   01/12/2017    GFRESTBLACK >90   GFR Calc   01/12/2017    TONY 8.6 01/12/2017        INR RESULTS:  Lab Results   Component Value Date    INR 1.12 01/12/2017    INR 1.09 01/11/2017    INR 1.16 (H) 01/10/2017    INR 1.13 01/09/2017       Assessment and Plan: See Dictation       CC  Patient Care Team:  Denise White APRN CNP as PCP - General (Nurse Practitioner)  Brynn Moy MD as Referring Physician (Cardiology)  Crescencio Kimball MD as MD (Critical Care Medicine)    REASON FOR VISIT:  The patient comes back for followup of nonischemic cardiomyopathy.      HISTORY OF PRESENT ILLNESS:  Mr. Akshat Fragoso is a 51-year-old gentleman who was recently diagnosed with nonischemic cardiomyopathy with an ejection fraction of 10%.  The patient's last visit with me was on 01/23/2017.  At that time we increased the patient's Toprol-XL to 25 mg once a day and continued with lisinopril at 5 mg.      In the last several weeks, patient's functional status has improved.  He overall feels a lot better than when he was first diagnosed to have cardiomyopathy.  He is still continuing to wear his LifeVest.      The patient denies any symptoms of exertional dyspnea, exertional angina, frequent palpitations, lightheadedness, presyncope or syncope.      ASSESSMENT AND PLAN:  Nonischemic cardiomyopathy with ejection fraction of 10%.      I discussed extensively with the patient and his wife this serious medical condition.  We will increase the lisinopril to 10 mg.  I will see the patient at the [facility] next week and at that point if the patient continues to do well, we will increase his Toprol-XL to 50 mg once a day.  The patient will need to have a cardiac MRI repeated in mid April to reassess his ejection fraction.      All  questions and concerns were addressed and patient is happy with the plan.  The plan has also been communicated to the patient's primary care provider.         JEANETTE BEDOLLA MD        D: 2017 17:56   T: 2017 18:28   MT: yesi      Name:     CRISTOBAL THOMAS   MRN:      [id number]        Account:      [id number]   :      1965           Visit Date:   2017      Document: [id number]

## 2017-02-13 NOTE — PROGRESS NOTES
HPI: See Dictation     PAST MEDICAL HISTORY:  Past Medical History   Diagnosis Date     CARDIOVASCULAR SCREENING; LDL GOAL LESS THAN 160 5/9/2010     Pneumonia 11/2011       CURRENT MEDICATIONS:  Current Outpatient Prescriptions   Medication Sig Dispense Refill     oxyCODONE-acetaminophen (PERCOCET) 5-325 MG per tablet Take 1 tablet by mouth every 6 hours as needed for pain . Use sparingly only for severe pain. 56 tablet 0     metoprolol (TOPROL-XL) 25 MG 24 hr tablet Take 1 tablet (25 mg) by mouth daily 30 tablet 2     nicotine (NICODERM CQ) 7 MG/24HR 24 hr patch Place 1 patch onto the skin every 24 hours 30 patch 1     albuterol (2.5 MG/3ML) 0.083% neb solution Take 1 vial (2.5 mg) by nebulization every 4 hours as needed for shortness of breath / dyspnea or wheezing 1 Box 3     aspirin EC 81 MG EC tablet Take 1 tablet (81 mg) by mouth daily 30 tablet 2     lisinopril (PRINIVIL/ZESTRIL) 5 MG tablet Take 1 tablet (5 mg) by mouth daily 30 tablet 2     furosemide (LASIX) 20 MG tablet Take 1 tablet (20 mg) by mouth daily as needed Only take if weight up by 3lbs 60 tablet 1       PAST SURGICAL HISTORY:  Past Surgical History   Procedure Laterality Date     None         ALLERGIES:     Allergies   Allergen Reactions     No Known Allergies        FAMILY HISTORY:  Non-contributory     SOCIAL HISTORY:  Social History   Substance Use Topics     Smoking status: Former Smoker     Packs/day: 0.50     Years: 15.00     Types: Cigarettes     Quit date: 12/1/2016     Smokeless tobacco: Former User     Quit date: 10/24/2011     Alcohol use No       ROS:   Constitutional: No fever, chills, or sweats. Weight stable.   ENT: No visual disturbance, ear ache, epistaxis, sore throat.   Cardiovascular: As per HPI.   Respiratory: No cough, hemoptysis.    GI: No nausea, vomiting, hematemesis, melena, or hematochezia.   : No hematuria.   Integument: Negative.   Psychiatric: Negative.   Hematologic:  Easy bruising, no easy bleeding.  Neuro:  Negative.   Endocrinology: No significant heat or cold intolerance   Musculoskeletal: No myalgia.    Exam:  There were no vitals taken for this visit.  GENERAL APPEARANCE: healthy, alert and no distress  HEENT: no icterus, no xanthelasmas, normal pupil size and reaction, normal palate, mucosa moist, no central cyanosis  NECK: no adenopathy, no asymmetry, masses, or scars, thyroid normal to palpation and no bruits, JVP not elevated  RESPIRATORY: lungs clear to auscultation - no rales, rhonchi or wheezes, no use of accessory muscles, no retractions, respirations are unlabored, normal respiratory rate  CARDIOVASCULAR: regular rhythm, normal S1 with physiologic split S2, no S3 or S4 and no murmur, click or rub, precordium quiet with normal PMI.  ABDOMEN: soft, non tender, without hepatosplenomegaly, no masses palpable, bowel sounds normal, aorta not enlarged by palpation, no abdominal bruits  EXTREMITIES: peripheral pulses normal, no edema, no bruits  NEURO: alert and oriented to person/place/time, normal speech, gait and affect  VASC: Radial, femoral, dorsalis pedis and posterior tibialis pulses are normal in volumes and symmetric bilaterally. No bruits are heard.  SKIN: no ecchymoses, no rashes    Labs:  CBC RESULTS:   Lab Results   Component Value Date    WBC 11.9 (H) 01/12/2017    RBC 5.34 01/12/2017    HGB 16.4 01/12/2017    HCT 51.1 01/12/2017    MCV 96 01/12/2017    MCH 30.7 01/12/2017    MCHC 32.1 01/12/2017    RDW 13.6 01/12/2017     01/12/2017       BMP RESULTS:  Lab Results   Component Value Date     01/12/2017    POTASSIUM 4.2 01/12/2017    CHLORIDE 103 01/12/2017    CO2 31 01/12/2017    ANIONGAP 5 01/12/2017    GLC 91 01/12/2017    BUN 12 01/12/2017    CR 0.85 01/12/2017    GFRESTIMATED >90  Non  GFR Calc   01/12/2017    GFRESTBLACK >90   GFR Calc   01/12/2017    TONY 8.6 01/12/2017        INR RESULTS:  Lab Results   Component Value Date    INR 1.12 01/12/2017     INR 1.09 01/11/2017    INR 1.16 (H) 01/10/2017    INR 1.13 01/09/2017             Assessment and Plan: See Dictation       CC  Patient Care Team:  Denise White APRN CNP as PCP - General (Nurse Practitioner)  Brynn Moy MD as Referring Physician (Cardiology)  Crescencio Kimball MD as MD (Critical Care Medicine)

## 2017-02-14 ENCOUNTER — TELEPHONE (OUTPATIENT)
Dept: FAMILY MEDICINE | Facility: CLINIC | Age: 52
End: 2017-02-14

## 2017-02-14 DIAGNOSIS — R07.81 RIB PAIN: Primary | ICD-10-CM

## 2017-02-14 RX ORDER — OXYCODONE AND ACETAMINOPHEN 5; 325 MG/1; MG/1
TABLET ORAL
Qty: 44 TABLET | Refills: 0 | Status: SHIPPED | OUTPATIENT
Start: 2017-02-14 | End: 2017-05-22

## 2017-02-14 NOTE — NURSING NOTE
Med Reconcile: Reviewed and verified all current medications with the patient. The updated medication list was printed and given to the patient.  Return Appointment: Patient given instructions regarding scheduling next clinic visit. Patient demonstrated understanding of this information and agreed to call with further questions or concerns.  [facility] next Monday double book 2/20-message sent to his [facility] nurse   Medication Change: Patient was educated regarding prescribed medication change, including discussion of the indication, administration, side effects, and when to report to MD or RN. Patient demonstrated understanding of this information and agreed to call with further questions or concerns.  Increase lisinopril to 10 mg daily  rx sent to preferred pharmacy  Patient stated he understood all health information given and agreed to call with further questions or concerns.  Leslie Larios RN

## 2017-02-14 NOTE — TELEPHONE ENCOUNTER
"Akshat has been taking percocet for complains of rib pain.  He has been titrating down on his medication, but he feels like he still needs it.  Per our discussion, we agreed that he can take one tablet twice a day for 2 weeks (as needed) and then 1 tablet daily for 4 weeks and then stop.  He is to continue other pain management, ice/heat, stretching, etc.  He was thankful/appreciative.  He will follow up with me in 1-2 months, sooner if problems.  I did tell him that this IS his last percocet refill.  If in 6 weeks he is still having pain, he will have to go off of the percocet anyway and we will have to work on other pain management therapist.  He verbalizes understanding.    He will continue care with cardiology and reports \"my heart is doing better.\"    Rx printed, signed, and forwarded to [facility] pharmacy.    "

## 2017-02-14 NOTE — PROGRESS NOTES
REASON FOR VISIT:  The patient comes back for followup of nonischemic cardiomyopathy.      HISTORY OF PRESENT ILLNESS:  Mr. Akshat Fragoso is a 51-year-old gentleman who was recently diagnosed with nonischemic cardiomyopathy with an ejection fraction of 10%.  The patient's last visit with me was on 2017.  At that time we increased the patient's Toprol-XL to 25 mg once a day and continued with lisinopril at 5 mg.      In the last several weeks, patient's functional status has improved.  He overall feels a lot better than when he was first diagnosed to have cardiomyopathy.  He is still continuing to wear his LifeVest.      The patient denies any symptoms of exertional dyspnea, exertional angina, frequent palpitations, lightheadedness, presyncope or syncope.      ASSESSMENT AND PLAN:  Nonischemic cardiomyopathy with ejection fraction of 10%.      I discussed extensively with the patient and his wife this serious medical condition.  We will increase the lisinopril to 10 mg.  I will see the patient at the [facility] next week and at that point if the patient continues to do well, we will increase his Toprol-XL to 50 mg once a day.  The patient will need to have a cardiac MRI repeated in mid April to reassess his ejection fraction.      All questions and concerns were addressed and patient is happy with the plan.  The plan has also been communicated to the patient's primary care provider.         JEANETTE BEDOLLA MD             D: 2017 17:56   T: 2017 18:28   MT: lg      Name:     AKSHAT FRAGOSO   MRN:      [id number]        Account:      [id number]   :      1965           Visit Date:   2017      Document: [id number]

## 2017-02-20 ENCOUNTER — PRE VISIT (OUTPATIENT)
Dept: CARDIOLOGY | Facility: CLINIC | Age: 52
End: 2017-02-20

## 2017-02-20 NOTE — TELEPHONE ENCOUNTER
2 week follow up NICM, discuss increasing Toprol-XL to 50 mg once a day.    Chart prep complete. All requested testing/labs completed.  Estrella August CMA.

## 2017-02-27 ENCOUNTER — OFFICE VISIT (OUTPATIENT)
Dept: CARDIOLOGY | Facility: CLINIC | Age: 52
End: 2017-02-27
Payer: COMMERCIAL

## 2017-02-27 VITALS — OXYGEN SATURATION: 97 % | HEART RATE: 81 BPM | DIASTOLIC BLOOD PRESSURE: 66 MMHG | SYSTOLIC BLOOD PRESSURE: 106 MMHG

## 2017-02-27 DIAGNOSIS — I42.8 CARDIOMYOPATHY, NONISCHEMIC (H): Primary | ICD-10-CM

## 2017-02-27 DIAGNOSIS — I50.9 CHRONIC CONGESTIVE HEART FAILURE, UNSPECIFIED CONGESTIVE HEART FAILURE TYPE: ICD-10-CM

## 2017-02-27 PROCEDURE — 99213 OFFICE O/P EST LOW 20 MIN: CPT | Performed by: INTERNAL MEDICINE

## 2017-02-27 RX ORDER — METOPROLOL SUCCINATE 50 MG/1
50 TABLET, EXTENDED RELEASE ORAL DAILY
Qty: 30 TABLET | Refills: 3 | Status: SHIPPED | OUTPATIENT
Start: 2017-02-27 | End: 2017-05-12 | Stop reason: ALTCHOICE

## 2017-02-27 ASSESSMENT — PAIN SCALES - GENERAL: PAINLEVEL: MILD PAIN (2)

## 2017-02-27 NOTE — PROGRESS NOTES
HPI: See Dictation     PAST MEDICAL HISTORY:  Past Medical History   Diagnosis Date     CARDIOVASCULAR SCREENING; LDL GOAL LESS THAN 160 5/9/2010     Pneumonia 11/2011       CURRENT MEDICATIONS:  Current Outpatient Prescriptions   Medication Sig Dispense Refill     oxyCODONE-acetaminophen (PERCOCET) 5-325 MG per tablet Take one tablet twice a day as needed for pain for 2 weeks and then 1 tablet per day for 4 weeks and then stop. 44 tablet 0     lisinopril (PRINIVIL/ZESTRIL) 10 MG tablet Take 1 tablet (10 mg) by mouth daily 30 tablet 3     oxyCODONE-acetaminophen (PERCOCET) 5-325 MG per tablet Take 1 tablet by mouth every 6 hours as needed for pain . Use sparingly only for severe pain. 56 tablet 0     metoprolol (TOPROL-XL) 25 MG 24 hr tablet Take 1 tablet (25 mg) by mouth daily 30 tablet 2     albuterol (2.5 MG/3ML) 0.083% neb solution Take 1 vial (2.5 mg) by nebulization every 4 hours as needed for shortness of breath / dyspnea or wheezing 1 Box 3     aspirin EC 81 MG EC tablet Take 1 tablet (81 mg) by mouth daily 30 tablet 2     furosemide (LASIX) 20 MG tablet Take 1 tablet (20 mg) by mouth daily as needed Only take if weight up by 3lbs 60 tablet 1     nicotine (NICODERM CQ) 7 MG/24HR 24 hr patch Place 1 patch onto the skin every 24 hours (Patient not taking: Reported on 2/13/2017) 30 patch 1       PAST SURGICAL HISTORY:  Past Surgical History   Procedure Laterality Date     None         ALLERGIES:     Allergies   Allergen Reactions     No Known Allergies        FAMILY HISTORY:  Non-contributory     SOCIAL HISTORY:  Social History   Substance Use Topics     Smoking status: Former Smoker     Packs/day: 0.50     Years: 15.00     Types: Cigarettes     Quit date: 12/1/2016     Smokeless tobacco: Former User     Quit date: 10/24/2011     Alcohol use No       ROS:   Constitutional: No fever, chills, or sweats. Weight stable.   ENT: No visual disturbance, ear ache, epistaxis, sore throat.   Cardiovascular: As per HPI.    Respiratory: No cough, hemoptysis.    GI: No nausea, vomiting, hematemesis, melena, or hematochezia.   : No hematuria.   Integument: Negative.   Psychiatric: Negative.   Hematologic:  Easy bruising, no easy bleeding.  Neuro: Negative.   Endocrinology: No significant heat or cold intolerance   Musculoskeletal: No myalgia.    Exam:  /66 (BP Location: Right arm, Patient Position: Chair, Cuff Size: Adult Regular)  Pulse 81  SpO2 97%  GENERAL APPEARANCE: healthy, alert and no distress  HEENT: no icterus, no xanthelasmas, normal pupil size and reaction, normal palate, mucosa moist, no central cyanosis  NECK: no adenopathy, no asymmetry, masses, or scars, thyroid normal to palpation and no bruits, JVP not elevated  RESPIRATORY: lungs clear to auscultation - no rales, rhonchi or wheezes, no use of accessory muscles, no retractions, respirations are unlabored, normal respiratory rate  CARDIOVASCULAR: regular rhythm, normal S1 with physiologic split S2, no S3 or S4 and no murmur, click or rub, precordium quiet with normal PMI.  ABDOMEN: soft, non tender, without hepatosplenomegaly, no masses palpable, bowel sounds normal, aorta not enlarged by palpation, no abdominal bruits  EXTREMITIES: peripheral pulses normal, no edema, no bruits  NEURO: alert and oriented to person/place/time, normal speech, gait and affect  VASC: Radial, femoral, dorsalis pedis and posterior tibialis pulses are normal in volumes and symmetric bilaterally. No bruits are heard.  SKIN: no ecchymoses, no rashes    Labs:  CBC RESULTS:   Lab Results   Component Value Date    WBC 11.9 (H) 01/12/2017    RBC 5.34 01/12/2017    HGB 16.4 01/12/2017    HCT 51.1 01/12/2017    MCV 96 01/12/2017    MCH 30.7 01/12/2017    MCHC 32.1 01/12/2017    RDW 13.6 01/12/2017     01/12/2017       BMP RESULTS:  Lab Results   Component Value Date     01/12/2017    POTASSIUM 4.2 01/12/2017    CHLORIDE 103 01/12/2017    CO2 31 01/12/2017    ANIONGAP 5 01/12/2017     GLC 91 01/12/2017    BUN 12 01/12/2017    CR 0.85 01/12/2017    GFRESTIMATED >90  Non  GFR Calc   01/12/2017    GFRESTBLACK >90   GFR Calc   01/12/2017    TONY 8.6 01/12/2017        INR RESULTS:  Lab Results   Component Value Date    INR 1.12 01/12/2017    INR 1.09 01/11/2017    INR 1.16 (H) 01/10/2017    INR 1.13 01/09/2017       Procedures:      Assessment and Plan: See Dictation       CC  Patient Care Team:  Denise White APRN CNP as PCP - General (Nurse Practitioner)  Jeanette Bedolla MD as Referring Physician (Cardiology)  Crescencio Kimball MD as MD (Critical Care Medicine)  JEANETTE BEDOLLA

## 2017-02-27 NOTE — LETTER
2/27/2017      RE: Akshat Fragoso  [address]  [facility] [address]       Dear Colleague,    Thank you for the opportunity to participate in the care of your patient, Akshat Fragoso, at the [facility] PHYSICIANS HEART AT [facility] at [facility]. Please see a copy of my visit note below.    HPI: See Dictation     PAST MEDICAL HISTORY:  Past Medical History   Diagnosis Date     CARDIOVASCULAR SCREENING; LDL GOAL LESS THAN 160 5/9/2010     Pneumonia 11/2011       CURRENT MEDICATIONS:  Current Outpatient Prescriptions   Medication Sig Dispense Refill     oxyCODONE-acetaminophen (PERCOCET) 5-325 MG per tablet Take one tablet twice a day as needed for pain for 2 weeks and then 1 tablet per day for 4 weeks and then stop. 44 tablet 0     lisinopril (PRINIVIL/ZESTRIL) 10 MG tablet Take 1 tablet (10 mg) by mouth daily 30 tablet 3     oxyCODONE-acetaminophen (PERCOCET) 5-325 MG per tablet Take 1 tablet by mouth every 6 hours as needed for pain . Use sparingly only for severe pain. 56 tablet 0     metoprolol (TOPROL-XL) 25 MG 24 hr tablet Take 1 tablet (25 mg) by mouth daily 30 tablet 2     albuterol (2.5 MG/3ML) 0.083% neb solution Take 1 vial (2.5 mg) by nebulization every 4 hours as needed for shortness of breath / dyspnea or wheezing 1 Box 3     aspirin EC 81 MG EC tablet Take 1 tablet (81 mg) by mouth daily 30 tablet 2     furosemide (LASIX) 20 MG tablet Take 1 tablet (20 mg) by mouth daily as needed Only take if weight up by 3lbs 60 tablet 1     nicotine (NICODERM CQ) 7 MG/24HR 24 hr patch Place 1 patch onto the skin every 24 hours (Patient not taking: Reported on 2/13/2017) 30 patch 1       PAST SURGICAL HISTORY:  Past Surgical History   Procedure Laterality Date     None         ALLERGIES:     Allergies   Allergen Reactions     No Known Allergies        FAMILY HISTORY:  Non-contributory     SOCIAL HISTORY:  Social History   Substance Use Topics     Smoking  status: Former Smoker     Packs/day: 0.50     Years: 15.00     Types: Cigarettes     Quit date: 12/1/2016     Smokeless tobacco: Former User     Quit date: 10/24/2011     Alcohol use No       ROS:   Constitutional: No fever, chills, or sweats. Weight stable.   ENT: No visual disturbance, ear ache, epistaxis, sore throat.   Cardiovascular: As per HPI.   Respiratory: No cough, hemoptysis.    GI: No nausea, vomiting, hematemesis, melena, or hematochezia.   : No hematuria.   Integument: Negative.   Psychiatric: Negative.   Hematologic:  Easy bruising, no easy bleeding.  Neuro: Negative.   Endocrinology: No significant heat or cold intolerance   Musculoskeletal: No myalgia.    Exam:  /66 (BP Location: Right arm, Patient Position: Chair, Cuff Size: Adult Regular)  Pulse 81  SpO2 97%  GENERAL APPEARANCE: healthy, alert and no distress  HEENT: no icterus, no xanthelasmas, normal pupil size and reaction, normal palate, mucosa moist, no central cyanosis  NECK: no adenopathy, no asymmetry, masses, or scars, thyroid normal to palpation and no bruits, JVP not elevated  RESPIRATORY: lungs clear to auscultation - no rales, rhonchi or wheezes, no use of accessory muscles, no retractions, respirations are unlabored, normal respiratory rate  CARDIOVASCULAR: regular rhythm, normal S1 with physiologic split S2, no S3 or S4 and no murmur, click or rub, precordium quiet with normal PMI.  ABDOMEN: soft, non tender, without hepatosplenomegaly, no masses palpable, bowel sounds normal, aorta not enlarged by palpation, no abdominal bruits  EXTREMITIES: peripheral pulses normal, no edema, no bruits  NEURO: alert and oriented to person/place/time, normal speech, gait and affect  VASC: Radial, femoral, dorsalis pedis and posterior tibialis pulses are normal in volumes and symmetric bilaterally. No bruits are heard.  SKIN: no ecchymoses, no rashes    Labs:  CBC RESULTS:   Lab Results   Component Value Date    WBC 11.9 (H) 01/12/2017     RBC 5.34 01/12/2017    HGB 16.4 01/12/2017    HCT 51.1 01/12/2017    MCV 96 01/12/2017    MCH 30.7 01/12/2017    MCHC 32.1 01/12/2017    RDW 13.6 01/12/2017     01/12/2017       BMP RESULTS:  Lab Results   Component Value Date     01/12/2017    POTASSIUM 4.2 01/12/2017    CHLORIDE 103 01/12/2017    CO2 31 01/12/2017    ANIONGAP 5 01/12/2017    GLC 91 01/12/2017    BUN 12 01/12/2017    CR 0.85 01/12/2017    GFRESTIMATED >90  Non  GFR Calc   01/12/2017    GFRESTBLACK >90   GFR Calc   01/12/2017    TONY 8.6 01/12/2017        INR RESULTS:  Lab Results   Component Value Date    INR 1.12 01/12/2017    INR 1.09 01/11/2017    INR 1.16 (H) 01/10/2017    INR 1.13 01/09/2017       Procedures:      Assessment and Plan: See Dictation       CC  Patient Care Team:  Denise White APRN CNP as PCP - General (Nurse Practitioner)  Jeanette Bedolla MD as Referring Physician (Cardiology)  Crescencio Kimball MD as MD (Critical Care Medicine)  JEANETTE BEDOLLA      REASON FOR VISIT:  The patient comes back for followup of a nonischemic cardiomyopathy.      HISTORY OF PRESENT ILLNESS:  Ms. Akshat Fragoso is a 51-year-old gentleman who was recently diagnosed with nonischemic cardiomyopathy with an ejection fraction of 10%.  The patient's last visit with me was 2 weeks ago on 02/13/2017.  At that time we increased the patient's lisinopril to 10 mg once daily and continue with Toprol-XL 25 mg once daily.      In the last several weeks, patient's functional status has continued to improve.  His stamina is much better now.  He denies any symptoms of orthopnea, PND or any lower extremity swelling.  He is still continuing to wear his LifeVest.      ASSESSMENT AND PLAN:  Nonischemic cardiomyopathy with ejection fraction of 10%.  I discussed extensively with the patient and his wife the serious medical condition.  We will increase Toprol-XL to 55 mg once daily.  The patient will have a repeat  cardiac MRI to assess ejection fraction in April and will see the patient after that.      All questions and concerns were addressed, and patient is happy with plan.  This has all been communicated to the patient's primary care provider.         JEANETTE BEDOLLA MD             D: 2017 13:52   T: 2017 14:19   MT: TREY      Name:     CRISTOBAL THOMAS   MRN:      [id number]        Account:      [id number]   :      1965           Visit Date:   2017      Document: [id number]

## 2017-02-27 NOTE — NURSING NOTE
Cardiac Testing: Patient given instructions regarding  Cardiac MRI with contrast mid April. Discussed purpose, preparation, procedure and when to expect results reported back to the patient. Patient demonstrated understanding of this information and agreed to call with further questions or concerns.  Med Reconcile: Reviewed and verified all current medications with the patient. The updated medication list was printed and given to the patient.  Return Appointment: Patient given instructions regarding scheduling next clinic visit-end of April. Patient demonstrated understanding of this information and agreed to call with further questions or concerns.    Medication Change: Increase toprol to 75 mg daily.  Patient was educated regarding prescribed medication change, including discussion of the indication, administration, side effects, and when to report to MD or RN. Patient demonstrated understanding of this information and agreed to call with further questions or concerns.    Patient stated he understood all health information given and agreed to call with further questions or concerns.    Leslie Larios RN

## 2017-02-27 NOTE — PROGRESS NOTES
REASON FOR VISIT:  The patient comes back for followup of a nonischemic cardiomyopathy.      HISTORY OF PRESENT ILLNESS:  Ms. Akshat Fragoso is a 51-year-old gentleman who was recently diagnosed with nonischemic cardiomyopathy with an ejection fraction of 10%.  The patient's last visit with me was 2 weeks ago on 2017.  At that time we increased the patient's lisinopril to 10 mg once daily and continue with Toprol-XL 25 mg once daily.      In the last several weeks, patient's functional status has continued to improve.  His stamina is much better now.  He denies any symptoms of orthopnea, PND or any lower extremity swelling.  He is still continuing to wear his LifeVest.      ASSESSMENT AND PLAN:  Nonischemic cardiomyopathy with ejection fraction of 10%.  I discussed extensively with the patient and his wife the serious medical condition.  We will increase Toprol-XL to 50 mg once daily.  The patient will have a repeat cardiac MRI to assess ejection fraction in April and will see the patient after that.      All questions and concerns were addressed, and patient is happy with plan.  This has all been communicated to the patient's primary care provider.         JEANETTE BEDOLLA MD             D: 2017 13:52   T: 2017 14:19   MT: TREY      Name:     AKSHAT FRAGOSO   MRN:      [id number]        Account:      [id number]   :      1965           Visit Date:   2017      Document: [id number]

## 2017-02-27 NOTE — MR AVS SNAPSHOT
After Visit Summary   2/27/2017    Akshat Fragoso    MRN: [id number]           Patient Information     Date Of Birth          1965        Visit Information        Provider Department      2/27/2017 1:00 PM Brynn Moy MD [facility] PHYSICIANS HEART AT [facility]        Today's Diagnoses     Chronic congestive heart failure, unspecified congestive heart failure type (H)          Care Instructions    Thank you for coming to the [facility] Physicians Heart at [facility]. Please note the following instructions:    1.  Please increase Toprol to 50 mg daily.  A prescription has been sent to your pharmacy for a 50 mg tablet daily.    2.  Dr. Moy has ordered a Cardiac MRI to be performed on you. We have scheduled this test at the [facility] on Monday, April 17th at 8:30 am  Please arrive 30 minutes early to the [facility] to allow enough time for registration.       CARDIAC MRI INSTRUCTIONS:    *Nothing to eat for 3 hours prior to test.  Water is permitable.    *No tobacco use for 12 hours prior to test.    *No medications or drinks containing caffeine (coffee, tea, chocolate, Anacin, Excedrin, NoDoz, etc.  for 12 hours prior to test.     *All other medications should be taken, especially High Blood Pressure prescriptions.    *Please allow 2 hour for test.    3.  We have scheduled you for a cardiac follow up appointment with Dr. Brynn Moy at the [facility]  ([address] [place] [address]) on Monday, April 24th at 2:00 pm with a check-in arrival time of 1:45 pm.  If this appointment conflicts with your schedule, please contact our specialty schedulers at [phone] to reschedule. We look forward to seeing you again.        [facility] - [place] Location    If you have any questions regarding to your visit please contact your care team:     Cardiology  Telephone Number   Henrique Owens  Cardiology RN's.    Halie  MERARI August [phone]    After hours: [phone].  [phone]   For scheduling appts:     [phone] or  [phone]    After hours: [phone]   For the Device Clinic (Pacemakers and ICD's)  RN's :  Alexandra Vu   During business hours: [phone]  After business hours:  [phone]- select option 4.      If you need a medication refill please contact your pharmacy.  Please allow 3 business days for your refill to be completed.    As always, Thank you for trusting us with your health care needs!  _____________________________________________________________________            Follow-ups after your visit        Your next 10 appointments already scheduled     Mar 31, 2017  9:35 AM CDT   (Arrive by 9:20 AM)   New Patient Visit with Crescencio Kimball MD   [facility] ([facility])    [address] [address] [phone]            Apr 17, 2017  8:00 AM CDT   MR MYOCARDIUM W CONTRAST with UUMR4, UU CV MR NURSE   [facility] ([facility])    [address] [facility] [id number] [phone]           Take your medicines as usual, unless your doctor tells you not to. Bring a list of your current medicines to your exam (including vitamins, minerals and over-the-counter drugs).  You will be given intravenous contrast for this exam. To prepare:   The day before your exam, drink extra fluids at least six 8-ounce glasses (unless your doctor tells you to restrict your fluids).   Have a blood test (creatinine test) within 30 days of your exam. Go to your clinic or Diagnostic Imaging Department for this test.  The MRI machine uses a strong magnet. Please wear clothes without metal (snaps, zippers). A sweatsuit works well, or we may give you a hospital gown.  Please remove any body piercings and hair extensions before you arrive. You  will also remove watches, jewelry, hairpins, wallets, dentures, partial dental plates and hearing aids. You may wear contact lenses, and you may be able to wear your rings. We have a safe place to keep your personal items, but it is safer to leave them at home.   **IMPORTANT** THE INSTRUCTIONS BELOW ARE ONLY FOR THOSE PATIENTS WHO HAVE BEEN TOLD THEY WILL RECEIVE SEDATION OR GENERAL ANESTHESIA DURING THEIR MRI PROCEDURE:  IF YOU WILL RECEIVE SEDATION (take medicine to help you relax during your exam):   You must get the medicine from your doctor before you arrive. Bring the medicine to the exam. Do not take it at home.   Arrive one hour early. Bring someone who can take you home after the test. Your medicine will make you sleepy. After the exam, you may not drive, take a bus or take a taxi by yourself.   No eating 8 hours before your exam. You may have clear liquids up until 4 hours before your exam. (Clear liquids include water, clear tea, black coffee and fruit juice without pulp.)  IF YOU WILL RECEIVE ANESTHESIA (be asleep for your exam):   Arrive 1 1/2 hours early. Bring someone who can take you home after the test. You may not drive, take a bus or take a taxi by yourself.   No eating 8 hours before your exam. You may have clear liquids up until 4 hours before your exam. (Clear liquids include water, clear tea, black coffee and fruit juice without pulp.)  Please call the Imaging Department at your exam site with any questions.            Apr 24, 2017  2:00 PM CDT   (Arrive by 11:20 AM)   Return Visit with Brynn Moy MD   [facility] PHYSICIANS [facility] AT [facility] ([facility])    [address] [place] [id number] [phone]              Future tests that were ordered for you today     Open Future Orders        Priority Expected Expires Ordered    MRI Cardiac w/contrast Routine 2/28/2017 2/27/2018 2/27/2017            Who to contact     If you have questions or  need follow up information about today's clinic visit or your schedule please contact [facility] PHYSICIANS HEART AT [facility] directly at [phone].  Normal or non-critical lab and imaging results will be communicated to you by MyChart, letter or phone within 4 business days after the clinic has received the results. If you do not hear from us within 7 days, please contact the clinic through [facility] or phone. If you have a critical or abnormal lab result, we will notify you by phone as soon as possible.  Submit refill requests through [employer] or call your pharmacy and they will forward the refill request to us. Please allow 3 business days for your refill to be completed.          Additional Information About Your Visit        [facility] Information     [employer] gives you secure access to your electronic health record. If you see a primary care provider, you can also send messages to your care team and make appointments. If you have questions, please call your primary care clinic.  If you do not have a primary care provider, please call [phone] and they will assist you.        Care EveryWhere ID     This is your Care EveryWhere ID. This could be used by other organizations to access your [place] medical records  [id number]        Your Vitals Were     Pulse Pulse Oximetry                81 97%           Blood Pressure from Last 3 Encounters:   02/27/17 106/66   02/13/17 107/73   01/25/17 108/82    Weight from Last 3 Encounters:   01/25/17 68.5 kg (151 lb)   01/23/17 68.5 kg (151 lb)   01/18/17 68 kg (150 lb)                 Today's Medication Changes          These changes are accurate as of: 2/27/17  1:41 PM.  If you have any questions, ask your nurse or doctor.               These medicines have changed or have updated prescriptions.        Dose/Directions    metoprolol 50 MG 24 hr tablet   Commonly known as:  TOPROL-XL   This may have changed:    - medication strength  - how much to take    Used for:  Chronic congestive heart failure, unspecified congestive heart failure type (H)   Changed by:  Brynn Moy MD        Dose:  50 mg   Take 1 tablet (50 mg) by mouth daily   Quantity:  30 tablet   Refills:  3            Where to get your medicines      These medications were sent to [facility] - [place], [place] - [address]  [address] [facility] [place] [address]     Phone:  [phone]     metoprolol 50 MG 24 hr tablet                Primary Care Provider Office Phone # Fax #    Denise HICKS Walterdenis, VERONICA FELIPE [phone]       [facility] [address] [facility] [address]  [place] [address]        Thank you!     Thank you for choosing [facility] PHYSICIANS HEART AT [facility]  for your care. Our goal is always to provide you with excellent care. Hearing back from our patients is one way we can continue to improve our services. Please take a few minutes to complete the written survey that you may receive in the mail after your visit with us. Thank you!             Your Updated Medication List - Protect others around you: Learn how to safely use, store and throw away your medicines at www.disposemymeds.org.          This list is accurate as of: 2/27/17  1:41 PM.  Always use your most recent med list.                   Brand Name Dispense Instructions for use    albuterol (2.5 MG/3ML) 0.083% neb solution     1 Box    Take 1 vial (2.5 mg) by nebulization every 4 hours as needed for shortness of breath / dyspnea or wheezing       aspirin 81 MG EC tablet     30 tablet    Take 1 tablet (81 mg) by mouth daily       furosemide 20 MG tablet    LASIX    60 tablet    Take 1 tablet (20 mg) by mouth daily as needed Only take if weight up by 3lbs       lisinopril 10 MG tablet    PRINIVIL/ZESTRIL    30 tablet    Take 1 tablet (10 mg) by mouth daily       metoprolol 50 MG 24 hr tablet    TOPROL-XL    30 tablet    Take 1 tablet (50 mg) by mouth daily        nicotine 7 MG/24HR 24 hr patch    NICODERM CQ    30 patch    Place 1 patch onto the skin every 24 hours       * oxyCODONE-acetaminophen 5-325 MG per tablet    PERCOCET    56 tablet    Take 1 tablet by mouth every 6 hours as needed for pain . Use sparingly only for severe pain.       * oxyCODONE-acetaminophen 5-325 MG per tablet    PERCOCET    44 tablet    Take one tablet twice a day as needed for pain for 2 weeks and then 1 tablet per day for 4 weeks and then stop.       * Notice:  This list has 2 medication(s) that are the same as other medications prescribed for you. Read the directions carefully, and ask your doctor or other care provider to review them with you.

## 2017-02-27 NOTE — PATIENT INSTRUCTIONS
Thank you for coming to the [facility] Physicians Heart at [facility]. Please note the following instructions:    1.  Please increase Toprol to 50 mg daily.  A prescription has been sent to your pharmacy for a 50 mg tablet daily.    2.  Dr. Moy has ordered a Cardiac MRI to be performed on you. We have scheduled this test at the [facility] on Monday, April 17th at 8:30 am  Please arrive 30 minutes early to the [facility] to allow enough time for registration.       CARDIAC MRI INSTRUCTIONS:    *Nothing to eat for 3 hours prior to test.  Water is permitable.    *No tobacco use for 12 hours prior to test.    *No medications or drinks containing caffeine (coffee, tea, chocolate, Anacin, Excedrin, NoDoz, etc.  for 12 hours prior to test.     *All other medications should be taken, especially High Blood Pressure prescriptions.    *Please allow 2 hour for test.    3.  We have scheduled you for a cardiac follow up appointment with Dr. Brynn Moy at the [facility]  ([address] [place] [address]) on Monday, April 24th at 2:00 pm with a check-in arrival time of 1:45 pm.  If this appointment conflicts with your schedule, please contact our specialty schedulers at [phone] to reschedule. We look forward to seeing you again.        [facility] Cardiology - [place] Location    If you have any questions regarding to your visit please contact your care team:     Cardiology  Telephone Number   Henrique Owens  Cardiology RN's.    Halie August CMA [phone]    After hours: [phone].  [phone]   For scheduling appts:     [phone] or  [phone]    After hours: [phone]   For the Device Clinic (Pacemakers and ICD's)  RN's :  Alexandra Vu   During business hours: [phone]  After business hours:  [phone]- select option 4.      If you need a medication refill please contact your pharmacy.  Please allow 3 business days for your refill  to be completed.    As always, Thank you for trusting us with your health care needs!  _____________________________________________________________________

## 2017-03-16 ENCOUNTER — PRE VISIT (OUTPATIENT)
Dept: PULMONOLOGY | Facility: CLINIC | Age: 52
End: 2017-03-16

## 2017-03-16 NOTE — TELEPHONE ENCOUNTER
1.  Date/reason for appt: 3/31/17 - chronic cough  2.  Referring provider: Dr. Brynn Moy   3.  Call to patient (Yes / No - short description): no, recs/img in epic  4.  Previous care at:   - [facility] Heart @  [place] (Dr. Moy)   - [facility] [place]   - Chest XR and CT both on 1/9/17 (in epic)   - Dr. Carlos Alberto Carmen (pulm here) in 2012

## 2017-03-20 DIAGNOSIS — R05.9 COUGH: Primary | ICD-10-CM

## 2017-03-23 ENCOUNTER — TELEPHONE (OUTPATIENT)
Dept: FAMILY MEDICINE | Facility: CLINIC | Age: 52
End: 2017-03-23

## 2017-03-23 DIAGNOSIS — R07.81 RIB PAIN: ICD-10-CM

## 2017-03-23 NOTE — TELEPHONE ENCOUNTER
Akshat called the clinic today in regards to his percocet refill. Based on what CO mentioned in the note below; the patient thinks for some reason he was supposed to have enough to last him until his next appointment either with cardiology or pulmonary on the 31st. He mentioned he is still in pain and ran out of his medication this past Lio 3/19 and is requesting to get more; i've mentioned to him the message that CO has stated but he's not comprehending that and is wondering what he should do since he still has pain. Please call patient back asap

## 2017-03-23 NOTE — TELEPHONE ENCOUNTER
Patient called requesting 12 Percocet he was shorted from original refill. According to sig, patient should have had 56 pills not 44 prescribed. Would you like to prescribe 12 more pills? Triage to call patient back with PCP response.    Thanks! Jacqueline Burr RN

## 2017-03-24 RX ORDER — OXYCODONE AND ACETAMINOPHEN 5; 325 MG/1; MG/1
1 TABLET ORAL 2 TIMES DAILY PRN
Qty: 10 TABLET | Refills: 0 | Status: SHIPPED | OUTPATIENT
Start: 2017-03-24 | End: 2017-05-22

## 2017-03-24 NOTE — TELEPHONE ENCOUNTER
He has got to wean down/off of the percocet.  I am sorry he is still having problems, but he is going to get into worse problems with addiction and withdrawal if we don't manage this right.  He has already received more percocet than I am comfortable with.

## 2017-03-24 NOTE — TELEPHONE ENCOUNTER
Patient called back stating that he was not looking to get a refill. Patient states that when they refilled the prescription the last time they didn't give him enough pills to follow in instructions given. Instructions that where given to him was to take two pills daily for two weeks than take one pill daily for 30 days. This would mean patient would need 58 pills. Patient says he only received 44 pills. He says he does not want a full refill, just the 14 pills that are missing.

## 2017-03-24 NOTE — TELEPHONE ENCOUNTER
I am sending out a message to Dr. Moy.  I need to double check with him that the percocet use is appropriate for c/o rib pain and wearing a LifeVest.  In the meantime, I have approved a 10 tablet supply of the percocet with the instructions on the label for him to use twice a day as needed.  He needs to space this out as I am hoping for an answer from Dr. Moy Monday but cannot guarantee it.   Rx printed, signed, and forwarded to nursing.

## 2017-03-24 NOTE — TELEPHONE ENCOUNTER
Dr. Moy,  Akshat has been taking percocet approximately 3 tablets per day for c/o rib pain related to wearing the life vest. At his last clinic appointment, I discussed at length with him the importance of taking percocet for short term only and he was to wean down/off of the percocet. At this time, he continues to take 3 tablets per day. Is this an appropriate need or should Akshat be down/off the percocet at this time? If it is appropriate to continue, what do you anticipate the length of need for his percocet?  With many thanks,  Denise

## 2017-03-24 NOTE — TELEPHONE ENCOUNTER
"CO review:  This is a patient persistent about wanting more pain medication.  I have relayed your response in a direct but kind manner but he is argumentative and will not accept the response.  The main gist is that he somehow has it in his head that he did not get enough pills.      We did discuss that if the pain is \"that bad\"  Maybe he will have to go to the ED.  I am not sure how to best diffuse this patient.  He then hung up the phone while I was speaking to him.   Eli Mcnally RN    "

## 2017-03-24 NOTE — TELEPHONE ENCOUNTER
Triage nurse LMOM for patient. Will leave the RX at  for  or else if patient would like can walk to  pharmacy next door.  Waiting to hear back.    Eli Mcnally RN      [facility] team:  Please place the percocet RX up at the  for  for now.  LM for patient.

## 2017-04-17 ENCOUNTER — HOSPITAL ENCOUNTER (OUTPATIENT)
Dept: MRI IMAGING | Facility: CLINIC | Age: 52
Discharge: HOME | End: 2017-04-17
Attending: INTERNAL MEDICINE | Admitting: INTERNAL MEDICINE
Payer: COMMERCIAL

## 2017-04-17 DIAGNOSIS — I50.9 CHRONIC CONGESTIVE HEART FAILURE, UNSPECIFIED CONGESTIVE HEART FAILURE TYPE: ICD-10-CM

## 2017-04-17 PROCEDURE — 75561 CARDIAC MRI FOR MORPH W/DYE: CPT

## 2017-04-17 PROCEDURE — A9585 GADOBUTROL INJECTION: HCPCS | Performed by: INTERNAL MEDICINE

## 2017-04-17 PROCEDURE — 25500064 ZZH RX 255 OP 636: Performed by: INTERNAL MEDICINE

## 2017-04-17 RX ORDER — GADOBUTROL 604.72 MG/ML
7.5 INJECTION INTRAVENOUS ONCE
Status: COMPLETED | OUTPATIENT
Start: 2017-04-17 | End: 2017-04-17

## 2017-04-17 RX ADMIN — GADOBUTROL 7.5 ML: 604.72 INJECTION INTRAVENOUS at 10:27

## 2017-04-17 RX ADMIN — GADOBUTROL 6 ML: 604.72 INJECTION INTRAVENOUS at 10:27

## 2017-04-18 ENCOUNTER — PRE VISIT (OUTPATIENT)
Dept: CARDIOLOGY | Facility: CLINIC | Age: 52
End: 2017-04-18

## 2017-04-18 NOTE — TELEPHONE ENCOUNTER
2 month follow up Nonischemic cardiomyopathy with ejection fraction of 10% on last ECHO. Discuss Cardiac MRI results from 4/17/17.    Chart prep complete. All requested testing/labs completed.  Estrella August CMA.

## 2017-04-24 ENCOUNTER — OFFICE VISIT (OUTPATIENT)
Dept: CARDIOLOGY | Facility: CLINIC | Age: 52
End: 2017-04-24
Payer: COMMERCIAL

## 2017-04-24 VITALS — HEART RATE: 86 BPM | SYSTOLIC BLOOD PRESSURE: 93 MMHG | OXYGEN SATURATION: 96 % | DIASTOLIC BLOOD PRESSURE: 57 MMHG

## 2017-04-24 DIAGNOSIS — R07.81 RIB PAIN: ICD-10-CM

## 2017-04-24 DIAGNOSIS — I42.8 CARDIOMYOPATHY, NONISCHEMIC (H): Primary | ICD-10-CM

## 2017-04-24 PROCEDURE — 99213 OFFICE O/P EST LOW 20 MIN: CPT | Performed by: INTERNAL MEDICINE

## 2017-04-24 RX ORDER — LIDOCAINE 40 MG/G
CREAM TOPICAL
Status: CANCELLED | OUTPATIENT
Start: 2017-04-24

## 2017-04-24 RX ORDER — OXYCODONE AND ACETAMINOPHEN 5; 325 MG/1; MG/1
1 TABLET ORAL 2 TIMES DAILY PRN
Qty: 28 TABLET | Refills: 0 | Status: SHIPPED | OUTPATIENT
Start: 2017-04-24 | End: 2017-05-22

## 2017-04-24 ASSESSMENT — PAIN SCALES - GENERAL: PAINLEVEL: MODERATE PAIN (4)

## 2017-04-24 NOTE — PROGRESS NOTES
REASON FOR VISIT:  The patient comes back for followup of nonischemic cardiomyopathy.      HISTORY OF PRESENT ILLNESS:  Mr. Akshat Fragoso is a 52-year-old gentleman who was recently diagnosed with nonischemic cardiomyopathy with an ejection fraction of 10%.  The patient's last visit with me was on 02/27/2017.  The patient is currently on lisinopril 10 mg once daily and Toprol-XL 50 mg once daily.  He is currently wearing a LifeVest for sudden cardiac death prevention.      The patient's functional status has continued to improve.  Unfortunately, the repeat cardiac MRI shows that his left ventricular ejection fraction remains at 12%.  He denies any symptoms of orthopnea, PND or any lower extremity swelling.      ASSESSMENT AND PLAN:  Nonischemic cardiomyopathy with ejection fraction of 12%.      I discussed extensively with the patient and his wife this complex medical situation.  Despite medical therapy, patient's ejection fraction remains low at less than 35%.  I discussed the aims, risks, and alternatives to implantation of a single-chamber ICD for sudden cardiac death prevention.  I explained that the risks of major problems associated with implant day for implantation of an ICD include but are not limited to, vascular injury, excessive bleeding requiring blood transfusion, pocket hematoma, pneumothorax requiring insertion of the chest tube, cardiac tamponade requiring pericardiocentesis or open heart surgery, stroke or thromboembolic problems, dislodgement of the leads, infection of the device and inappropriate shocks.  After an extensive discussion, the patient would like to proceed with implantation of a single-chamber ICD.      We will schedule implantation of a single-chamber [employer] ICD.      We will also refer patient to Dr. Channing Cardona for advanced heart failure treatment.      All questions and concerns were addressed, and patient is happy with plan.  The plan has also been communicated to the  patient's primary care provider.         JEANETTE BEDOLLA MD             D: 2017 14:53   T: 2017 15:59   MT: TREY      Name:     CRISTOBAL THOMAS   MRN:      [id number]        Account:      [id number]   :      1965           Visit Date:   2017      Document: [id number]

## 2017-04-24 NOTE — LETTER
4/24/2017      RE: Akshat Fragoso  [address]  [facility] [address]       Dear Colleague,    Thank you for the opportunity to participate in the care of your patient, Akshat Fragoso, at the [facility]. Please see a copy of my visit note below.    HPI: See Dictation     PAST MEDICAL HISTORY:  Past Medical History:   Diagnosis Date     CARDIOVASCULAR SCREENING; LDL GOAL LESS THAN 160 5/9/2010     Pneumonia 11/2011       CURRENT MEDICATIONS:  Current Outpatient Prescriptions   Medication Sig Dispense Refill     oxyCODONE-acetaminophen (PERCOCET) 5-325 MG per tablet Take 1 tablet by mouth 2 times daily as needed for pain 10 tablet 0     metoprolol (TOPROL-XL) 50 MG 24 hr tablet Take 1 tablet (50 mg) by mouth daily 30 tablet 3     oxyCODONE-acetaminophen (PERCOCET) 5-325 MG per tablet Take one tablet twice a day as needed for pain for 2 weeks and then 1 tablet per day for 4 weeks and then stop. 44 tablet 0     lisinopril (PRINIVIL/ZESTRIL) 10 MG tablet Take 1 tablet (10 mg) by mouth daily 30 tablet 3     oxyCODONE-acetaminophen (PERCOCET) 5-325 MG per tablet Take 1 tablet by mouth every 6 hours as needed for pain . Use sparingly only for severe pain. 56 tablet 0     furosemide (LASIX) 20 MG tablet Take 1 tablet (20 mg) by mouth daily as needed Only take if weight up by 3lbs (Patient not taking: Reported on 4/24/2017) 60 tablet 1       PAST SURGICAL HISTORY:  Past Surgical History:   Procedure Laterality Date     None         ALLERGIES:     Allergies   Allergen Reactions     No Known Allergies        FAMILY HISTORY:  Non-contributory      SOCIAL HISTORY:  Social History   Substance Use Topics     Smoking status: Former Smoker     Packs/day: 0.50     Years: 15.00     Types: Cigarettes     Quit date: 12/1/2016     Smokeless tobacco: Former User     Quit date: 10/24/2011     Alcohol use No       ROS:   Constitutional:  No fever, chills, or sweats. Weight stable.   ENT: No visual disturbance, ear ache, epistaxis, sore throat.   Cardiovascular: As per HPI.   Respiratory: No cough, hemoptysis.    GI: No nausea, vomiting, hematemesis, melena, or hematochezia.   : No hematuria.   Integument: Negative.   Psychiatric: Negative.   Hematologic:  Easy bruising, no easy bleeding.  Neuro: Negative.   Endocrinology: No significant heat or cold intolerance   Musculoskeletal: No myalgia.    Exam:  BP 93/57 (BP Location: Left arm, Patient Position: Chair, Cuff Size: Adult Regular)  Pulse 86  SpO2 96%  GENERAL APPEARANCE: healthy, alert and no distress  HEENT: no icterus, no xanthelasmas, normal pupil size and reaction, normal palate, mucosa moist, no central cyanosis  NECK: no adenopathy, no asymmetry, masses, or scars, thyroid normal to palpation and no bruits, JVP not elevated  RESPIRATORY: lungs clear to auscultation - no rales, rhonchi or wheezes, no use of accessory muscles, no retractions, respirations are unlabored, normal respiratory rate  CARDIOVASCULAR: regular rhythm, normal S1 with physiologic split S2, no S3 or S4 and no murmur, click or rub, precordium quiet with normal PMI.  ABDOMEN: soft, non tender, without hepatosplenomegaly, no masses palpable, bowel sounds normal, aorta not enlarged by palpation, no abdominal bruits  EXTREMITIES: peripheral pulses normal, no edema, no bruits  NEURO: alert and oriented to person/place/time, normal speech, gait and affect  VASC: Radial, femoral, dorsalis pedis and posterior tibialis pulses are normal in volumes and symmetric bilaterally. No bruits are heard.  SKIN: no ecchymoses, no rashes    Labs:  CBC RESULTS:   Lab Results   Component Value Date    WBC 11.9 (H) 01/12/2017    RBC 5.34 01/12/2017    HGB 16.4 01/12/2017    HCT 51.1 01/12/2017    MCV 96 01/12/2017    MCH 30.7 01/12/2017    MCHC 32.1 01/12/2017    RDW 13.6 01/12/2017     01/12/2017       BMP RESULTS:  Lab Results    Component Value Date     01/12/2017    POTASSIUM 4.2 01/12/2017    CHLORIDE 103 01/12/2017    CO2 31 01/12/2017    ANIONGAP 5 01/12/2017    GLC 91 01/12/2017    BUN 12 01/12/2017    CR 0.85 01/12/2017    GFRESTIMATED >90  Non  GFR Calc   01/12/2017    GFRESTBLACK >90   GFR Calc   01/12/2017    TONY 8.6 01/12/2017        INR RESULTS:  Lab Results   Component Value Date    INR 1.12 01/12/2017    INR 1.09 01/11/2017    INR 1.16 (H) 01/10/2017    INR 1.13 01/09/2017       Procedures:      Assessment and Plan: See Dictation       CC  Patient Care Team:  Denise White APRN CNP as PCP - General (Nurse Practitioner)  Jeanette Bedolla MD as Referring Physician (Cardiology)  Crescencio Kimball MD as MD (Critical Care Medicine)  JEANETTE BEDOLLA      REASON FOR VISIT:  The patient comes back for followup of nonischemic cardiomyopathy.      HISTORY OF PRESENT ILLNESS:  Mr. Akshat Fragoso is a 52-year-old gentleman who was recently diagnosed with nonischemic cardiomyopathy with an ejection fraction of 10%.  The patient's last visit with me was on 02/27/2017.  The patient is currently on lisinopril 10 mg once daily and Toprol-XL 50 mg once daily.  He is currently wearing a LifeVest for sudden cardiac death prevention.      The patient's functional status has continued to improve.  Unfortunately, the repeat cardiac MRI shows that his left ventricular ejection fraction remains at 12%.  He denies any symptoms of orthopnea, PND or any lower extremity swelling.      ASSESSMENT AND PLAN:  Nonischemic cardiomyopathy with ejection fraction of 12%.      I discussed extensively with the patient and his wife this complex medical situation.  Despite medical therapy, patient's ejection fraction remains low at less than 35%.  I discussed the aims, risks, and alternatives to implantation of a single-chamber ICD for sudden cardiac death prevention.  I explained that the risks of major problems  associated with implant day for implantation of an ICD include but are not limited to, vascular injury, excessive bleeding requiring blood transfusion, pocket hematoma, pneumothorax requiring insertion of the chest tube, cardiac tamponade requiring pericardiocentesis or open heart surgery, stroke or thromboembolic problems, dislodgement of the leads, infection of the device and inappropriate shocks.  After an extensive discussion, the patient would like to proceed with implantation of a single-chamber ICD.      We will schedule implantation of a single-chamber [employer] ICD.      We will also refer patient to Dr. Channing Cardona for advanced heart failure treatment.      All questions and concerns were addressed, and patient is happy with plan.  The plan has also been communicated to the patient's primary care provider.         JEANETTE BEDOLLA MD             D: 2017 14:53   T: 2017 15:59   MT: TREY      Name:     CRISTOBAL THOMAS   MRN:      [id number]        Account:      [id number]   :      1965           Visit Date:   2017      Document: [id number]

## 2017-04-24 NOTE — PROGRESS NOTES
HPI: See Dictation     PAST MEDICAL HISTORY:  Past Medical History:   Diagnosis Date     CARDIOVASCULAR SCREENING; LDL GOAL LESS THAN 160 5/9/2010     Pneumonia 11/2011       CURRENT MEDICATIONS:  Current Outpatient Prescriptions   Medication Sig Dispense Refill     oxyCODONE-acetaminophen (PERCOCET) 5-325 MG per tablet Take 1 tablet by mouth 2 times daily as needed for pain 10 tablet 0     metoprolol (TOPROL-XL) 50 MG 24 hr tablet Take 1 tablet (50 mg) by mouth daily 30 tablet 3     oxyCODONE-acetaminophen (PERCOCET) 5-325 MG per tablet Take one tablet twice a day as needed for pain for 2 weeks and then 1 tablet per day for 4 weeks and then stop. 44 tablet 0     lisinopril (PRINIVIL/ZESTRIL) 10 MG tablet Take 1 tablet (10 mg) by mouth daily 30 tablet 3     oxyCODONE-acetaminophen (PERCOCET) 5-325 MG per tablet Take 1 tablet by mouth every 6 hours as needed for pain . Use sparingly only for severe pain. 56 tablet 0     furosemide (LASIX) 20 MG tablet Take 1 tablet (20 mg) by mouth daily as needed Only take if weight up by 3lbs (Patient not taking: Reported on 4/24/2017) 60 tablet 1       PAST SURGICAL HISTORY:  Past Surgical History:   Procedure Laterality Date     None         ALLERGIES:     Allergies   Allergen Reactions     No Known Allergies        FAMILY HISTORY:  Non-contributory      SOCIAL HISTORY:  Social History   Substance Use Topics     Smoking status: Former Smoker     Packs/day: 0.50     Years: 15.00     Types: Cigarettes     Quit date: 12/1/2016     Smokeless tobacco: Former User     Quit date: 10/24/2011     Alcohol use No       ROS:   Constitutional: No fever, chills, or sweats. Weight stable.   ENT: No visual disturbance, ear ache, epistaxis, sore throat.   Cardiovascular: As per HPI.   Respiratory: No cough, hemoptysis.    GI: No nausea, vomiting, hematemesis, melena, or hematochezia.   : No hematuria.   Integument: Negative.   Psychiatric: Negative.   Hematologic:  Easy bruising, no easy  bleeding.  Neuro: Negative.   Endocrinology: No significant heat or cold intolerance   Musculoskeletal: No myalgia.    Exam:  BP 93/57 (BP Location: Left arm, Patient Position: Chair, Cuff Size: Adult Regular)  Pulse 86  SpO2 96%  GENERAL APPEARANCE: healthy, alert and no distress  HEENT: no icterus, no xanthelasmas, normal pupil size and reaction, normal palate, mucosa moist, no central cyanosis  NECK: no adenopathy, no asymmetry, masses, or scars, thyroid normal to palpation and no bruits, JVP not elevated  RESPIRATORY: lungs clear to auscultation - no rales, rhonchi or wheezes, no use of accessory muscles, no retractions, respirations are unlabored, normal respiratory rate  CARDIOVASCULAR: regular rhythm, normal S1 with physiologic split S2, no S3 or S4 and no murmur, click or rub, precordium quiet with normal PMI.  ABDOMEN: soft, non tender, without hepatosplenomegaly, no masses palpable, bowel sounds normal, aorta not enlarged by palpation, no abdominal bruits  EXTREMITIES: peripheral pulses normal, no edema, no bruits  NEURO: alert and oriented to person/place/time, normal speech, gait and affect  VASC: Radial, femoral, dorsalis pedis and posterior tibialis pulses are normal in volumes and symmetric bilaterally. No bruits are heard.  SKIN: no ecchymoses, no rashes    Labs:  CBC RESULTS:   Lab Results   Component Value Date    WBC 11.9 (H) 01/12/2017    RBC 5.34 01/12/2017    HGB 16.4 01/12/2017    HCT 51.1 01/12/2017    MCV 96 01/12/2017    MCH 30.7 01/12/2017    MCHC 32.1 01/12/2017    RDW 13.6 01/12/2017     01/12/2017       BMP RESULTS:  Lab Results   Component Value Date     01/12/2017    POTASSIUM 4.2 01/12/2017    CHLORIDE 103 01/12/2017    CO2 31 01/12/2017    ANIONGAP 5 01/12/2017    GLC 91 01/12/2017    BUN 12 01/12/2017    CR 0.85 01/12/2017    GFRESTIMATED >90  Non  GFR Calc   01/12/2017    GFRESTBLACK >90   GFR Calc   01/12/2017    TONY 8.6 01/12/2017         INR RESULTS:  Lab Results   Component Value Date    INR 1.12 01/12/2017    INR 1.09 01/11/2017    INR 1.16 (H) 01/10/2017    INR 1.13 01/09/2017       Procedures:      Assessment and Plan: See Dictation       CC  Patient Care Team:  Denise White APRN CNP as PCP - General (Nurse Practitioner)  Brynn Bedolla MD as Referring Physician (Cardiology)  Crescecnio Kimball MD as MD (Critical Care Medicine)  BRYNN BEDOLLA

## 2017-04-24 NOTE — PATIENT INSTRUCTIONS
1.  Dr. Brynn Moy would like for you to see Dr. Channing Cardona for heart failure management.     Initial visit is scheduled at the [address] on Friday, May 12th at 10:30 am.    Then [facility] are [facility] at the [facility] @ [address] [place] [id number] on Friday 06/16/17 @ 2:45pm.  3rd floor with Dr. Cardona    2. Percocet prescription has been printed and signed.    3. Dr. Moy has ordered for you to have a defibrillator implantation.  This will be completed at the [facility]/[address].-we will call you with date, time and instructions        [facility] Card[facility] - [facility]    If you have any questions regarding to your visit please contact your care team:     Cardiology  Telephone Number   Henrique Owens  Cardiology RN's.    Halie August CMA [phone]    After hours: [phone].  [phone]   For scheduling appts:     [phone] or  [phone]    After hours: [phone]   For the Device Clinic (Pacemakers and ICD's)  RN's :  Alexandra Vu   During business hours: [phone]  After business hours:  [phone]- select option 4.      If you need a medication refill please contact your pharmacy.  Please allow 3 business days for your refill to be completed.    As always, Thank you for trusting us with your health care needs!  _____________________________________________________________________      Implantable Cardioverter Defibrillator (ICD)  An ICD is a device that is placed permanently inside your body. An ICD monitors your heart rhythm (the speed and pattern of your heartbeat). If this rhythm becomes too fast or too slow, the ICD sends out electrical signals that help bring the rhythm back to normal. The ICD is put inside your body during a minor surgical procedure called implantation. In most cases, implantation takes 1 to 3 hours.  Before the procedure    Don t eat or drink  anything after midnight the night before the procedure, or 8 hours before the procedure.    Follow your healthcare provider's instructions on what medicines to take.    You may be asked to shower with antibacterial soap the night before your procedure and the morning of the procedure. Ask your provider if he or she wants you to use a certain kind of soap.    Your provider may ask you to use clean bed sheets and pajamas the night before the procedure.  How the ICD is put into your body    The ICD is usually put on the left side of your chest. Putting the ICD in your body (implantation) does not need open heart surgery. This means your chest will not be opened. During the procedure:    You will be given medicine to help you relax.    The doctor makes a cut (incision) in the skin below your collarbone. This creates a  pocket  to hold the ICD.    The doctor threads a wire (lead) through the incision into a vein in the upper chest. With the help of X-ray monitors, the doctor guides the lead into one of the heart s chambers. Depending on how many leads your ICD has, this process may be repeated to guide leads into other chambers.    The doctor attaches the leads to the heart muscle so they will stay in place. The leads will be tested to make sure they are placed correctly.    The battery (generator) is attached to the leads. Then the doctor places the generator in its pocket under the skin.    The doctor may start (induce) a fast heart rhythm to test the ICD.    When everything else is done, the incision is closed with sutures that dissolve, medical glue, or staples.  Other implantation sites  In some cases, the ICD can be put elsewhere in the body. This could be in the abdomen, on the right side of the chest, or on the left side under the muscle. If one of these is an option for you, your doctor will explain more.  After the procedure  You may stay in the hospital overnight, or you may go home the same day. While you are  in the hospital, your heart s signals are monitored to see how the ICD is working. You can go home when your condition is stable. Once you get home:    Follow your discharge instructions to care for your incision. Watch for signs of infection (see box).    Follow any special instructions to care for the side of your body where your ICD was implanted. Your doctor may tell you not to raise that arm above the shoulder for a certain amount of time.    You ll likely have bruising at the incision site for about a month. This is normal and will go away as the incision heals.    You can probably return to your normal routine soon after implantation. Ask your doctor when you can return to work.    You may be instructed not to drive for a certain amount of time.    See your doctor for follow-up visits as recommended.     Call your healthcare team  Contact your healthcare team if you have any of the following:    Signs of an infection, such as: fever over 100 F (37.8 C), drainage from the incision, or redness, swelling, or warmth at the incision site    Twitching chest muscles    Pain around your ICD that gets worse, not better    Bleeding from your incision    Swelling in the arm on the side of the incision site    Severe swelling of the incision site (bulging up like a golf ball)    Chest pain or shortness of breath     2258-8122 [employer]. [address] [place] [address], [place] [address]. All rights reserved. This information is not intended as a substitute for professional medical care. Always follow your healthcare professional's instructions.

## 2017-04-24 NOTE — MR AVS SNAPSHOT
After Visit Summary   4/24/2017    Akshat Fragoso    MRN: [id number]           Patient Information     Date Of Birth          1965        Visit Information        Provider Department      4/24/2017 2:00 PM Brynn Moy MD [facility] PHYSICIANS HEART AT [facility]        Today's Diagnoses     Cardiomyopathy, nonischemic (H)    -  1    Rib pain          Care Instructions    1.  Dr. Brynn Moy would like for you to see Dr. Channing Cardona for heart failure management.     Initial visit is scheduled at the [address] on Friday, May 12th at 10:30 am.    Then you are scheduled at the [facility] @ [address] [place] [id number] on Friday 06/16/17 @ 2:45pm.  3rd floor with Dr. Cardona    2. Percocet prescription has been printed and signed.    3. Dr. Moy has ordered for you to have a defibrillator implantation.  This will be completed at the [facility].-we will call you with date, time and instructions        [facility] Cardiology - [facility]    If you have any questions regarding to your visit please contact your care team:     Cardiology  Telephone Number   Leslie Henrique Larios  Cardiology RN's.    Halie August [facility] [phone]    After hours: [phone].  [phone]   For scheduling appts:     [phone] or  [phone]    After hours: [phone]   For the Device Clinic (Pacemakers and ICD's)  RN's :  Alexandra Vu   During business hours: [phone]  After business hours:  [phone]- select option 4.      If you need a medication refill please contact your pharmacy.  Please allow 3 business days for your refill to be completed.    As always, Thank you for trusting us with your health care needs!  _____________________________________________________________________      Implantable Cardioverter Defibrillator (ICD)  An ICD is a device that is placed  permanently inside your body. An ICD monitors your heart rhythm (the speed and pattern of your heartbeat). If this rhythm becomes too fast or too slow, the ICD sends out electrical signals that help bring the rhythm back to normal. The ICD is put inside your body during a minor surgical procedure called implantation. In most cases, implantation takes 1 to 3 hours.  Before the procedure    Don t eat or drink anything after midnight the night before the procedure, or 8 hours before the procedure.    Follow your healthcare provider's instructions on what medicines to take.    You may be asked to shower with antibacterial soap the night before your procedure and the morning of the procedure. Ask your provider if he or she wants you to use a certain kind of soap.    Your provider may ask you to use clean bed sheets and pajamas the night before the procedure.  How the ICD is put into your body    The ICD is usually put on the left side of your chest. Putting the ICD in your body (implantation) does not need open heart surgery. This means your chest will not be opened. During the procedure:    You will be given medicine to help you relax.    The doctor makes a cut (incision) in the skin below your collarbone. This creates a  pocket  to hold the ICD.    The doctor threads a wire (lead) through the incision into a vein in the upper chest. With the help of X-ray monitors, the doctor guides the lead into one of the heart s chambers. Depending on how many leads your ICD has, this process may be repeated to guide leads into other chambers.    The doctor attaches the leads to the heart muscle so they will stay in place. The leads will be tested to make sure they are placed correctly.    The battery (generator) is attached to the leads. Then the doctor places the generator in its pocket under the skin.    The doctor may start (induce) a fast heart rhythm to test the ICD.    When everything else is done, the incision is closed with  sutures that dissolve, medical glue, or staples.  Other implantation sites  In some cases, the ICD can be put elsewhere in the body. This could be in the abdomen, on the right side of the chest, or on the left side under the muscle. If one of these is an option for you, your doctor will explain more.  After the procedure  You may stay in the hospital overnight, or you may go home the same day. While you are in the hospital, your heart s signals are monitored to see how the ICD is working. You can go home when your condition is stable. Once you get home:    Follow your discharge instructions to care for your incision. Watch for signs of infection (see box).    Follow any special instructions to care for the side of your body where your ICD was implanted. Your doctor may tell you not to raise that arm above the shoulder for a certain amount of time.    You ll likely have bruising at the incision site for about a month. This is normal and will go away as the incision heals.    You can probably return to your normal routine soon after implantation. Ask your doctor when you can return to work.    You may be instructed not to drive for a certain amount of time.    See your doctor for follow-up visits as recommended.     Call your healthcare team  Contact your healthcare team if you have any of the following:    Signs of an infection, such as: fever over 100 F (37.8 C), drainage from the incision, or redness, swelling, or warmth at the incision site    Twitching chest muscles    Pain around your ICD that gets worse, not better    Bleeding from your incision    Swelling in the arm on the side of the incision site    Severe swelling of the incision site (bulging up like a golf ball)    Chest pain or shortness of breath     9966-3074 [employer]. [address] [place] [address]. All rights reserved. This information is not intended as a substitute for professional medical care. Always follow your  healthcare professional's instructions.              Follow-ups after your visit        Your next 10 appointments already scheduled     May 12, 2017 10:30 AM CDT   New Visit with Channing Cardona MD   [facility] PHYSICIANS HEART AT [facility] ([facility])    [address] [place] [address] 2nd Floor  [facility] [id number] [phone]            Jun 16, 2017  3:00 PM CDT   (Arrive by 2:45 PM)   RETURN HEART FAILURE with Channing Cardona MD   [facility] [place] [facility]    [address] [facility]  3rd [facility] [address] [phone]              Who to contact     If you have questions or need follow up information about today's clinic visit or your schedule please contact [facility] PHYSICIANS HEART AT [facility] directly at [phone].  Normal or non-critical lab and imaging results will be communicated to you by MyChart, letter or phone within 4 business days after the clinic has received the results. If you do not hear from us within 7 days, please contact the clinic through [employer] or phone. If you have a critical or abnormal lab result, we will notify you by phone as soon as possible.  Submit refill requests through "[employer]" or call your pharmacy and they will forward the refill request to us. Please allow 3 business days for your refill to be completed.          Additional Information About Your Visit        MyChart Information     "[employer] gives you secure access to your electronic health record. If you see a primary care provider, you can also send messages to your care team and make appointments. If you have questions, please call your primary care clinic.  If you do not have a primary care provider, please call [phone] and they will assist you.        Care EveryWhere ID     This is your Care EveryWhere ID. This could be used by other organizations to access your [facility] medical records  [id number]        Your  Vitals Were     Pulse Pulse Oximetry                86 96%           Blood Pressure from Last 3 Encounters:   04/24/17 93/57   02/27/17 106/66   02/13/17 107/73    Weight from Last 3 Encounters:   01/25/17 68.5 kg (151 lb)   01/23/17 68.5 kg (151 lb)   01/18/17 68 kg (150 lb)              Today, you had the following     No orders found for display         Today's Medication Changes          These changes are accurate as of: 4/24/17  2:42 PM.  If you have any questions, ask your nurse or doctor.               These medicines have changed or have updated prescriptions.        Dose/Directions    * oxyCODONE-acetaminophen 5-325 MG per tablet   Commonly known as:  PERCOCET   This may have changed:  Another medication with the same name was added. Make sure you understand how and when to take each.   Used for:  Rib pain   Changed by:  Denise White APRN CNP        Dose:  1 tablet   Take 1 tablet by mouth every 6 hours as needed for pain . Use sparingly only for severe pain.   Quantity:  56 tablet   Refills:  0       * oxyCODONE-acetaminophen 5-325 MG per tablet   Commonly known as:  PERCOCET   This may have changed:  Another medication with the same name was added. Make sure you understand how and when to take each.   Used for:  Rib pain   Changed by:  Denise White APRN CNP        Take one tablet twice a day as needed for pain for 2 weeks and then 1 tablet per day for 4 weeks and then stop.   Quantity:  44 tablet   Refills:  0       * oxyCODONE-acetaminophen 5-325 MG per tablet   Commonly known as:  PERCOCET   This may have changed:  Another medication with the same name was added. Make sure you understand how and when to take each.   Used for:  Rib pain   Changed by:  Denise White APRN CNP        Dose:  1 tablet   Take 1 tablet by mouth 2 times daily as needed for pain   Quantity:  10 tablet   Refills:  0       * oxyCODONE-acetaminophen 5-325 MG per tablet   Commonly known as:   PERCOCET   This may have changed:  You were already taking a medication with the same name, and this prescription was added. Make sure you understand how and when to take each.   Used for:  Rib pain   Changed by:  Brynn Moy MD        Dose:  1 tablet   Take 1 tablet by mouth 2 times daily as needed for pain maximum 2 tablet(s) per day   Quantity:  28 tablet   Refills:  0       * Notice:  This list has 4 medication(s) that are the same as other medications prescribed for you. Read the directions carefully, and ask your doctor or other care provider to review them with you.         Where to get your medicines      Some of these will need a paper prescription and others can be bought over the counter.  Ask your nurse if you have questions.     Bring a paper prescription for each of these medications     oxyCODONE-acetaminophen 5-325 MG per tablet                Primary Care Provider Office Phone # Fax #    Denise VERONICA Blood -[phone]       [facility] [address] [place] [address]        Thank you!     Thank you for choosing [facility] PHYSICIANS HEART AT [facility]  for your care. Our goal is always to provide you with excellent care. Hearing back from our patients is one way we can continue to improve our services. Please take a few minutes to complete the written survey that you may receive in the mail after your visit with us. Thank you!             Your Updated Medication List - Protect others around you: Learn how to safely use, store and throw away your medicines at www.disposemymeds.org.          This list is accurate as of: 4/24/17  2:42 PM.  Always use your most recent med list.                   Brand Name Dispense Instructions for use    furosemide 20 MG tablet    LASIX    60 tablet    Take 1 tablet (20 mg) by mouth daily as needed Only take if weight up by 3lbs       lisinopril 10 MG tablet    PRINIVIL/ZESTRIL    30 tablet     Take 1 tablet (10 mg) by mouth daily       metoprolol 50 MG 24 hr tablet    TOPROL-XL    30 tablet    Take 1 tablet (50 mg) by mouth daily       * oxyCODONE-acetaminophen 5-325 MG per tablet    PERCOCET    56 tablet    Take 1 tablet by mouth every 6 hours as needed for pain . Use sparingly only for severe pain.       * oxyCODONE-acetaminophen 5-325 MG per tablet    PERCOCET    44 tablet    Take one tablet twice a day as needed for pain for 2 weeks and then 1 tablet per day for 4 weeks and then stop.       * oxyCODONE-acetaminophen 5-325 MG per tablet    PERCOCET    10 tablet    Take 1 tablet by mouth 2 times daily as needed for pain       * oxyCODONE-acetaminophen 5-325 MG per tablet    PERCOCET    28 tablet    Take 1 tablet by mouth 2 times daily as needed for pain maximum 2 tablet(s) per day       * Notice:  This list has 4 medication(s) that are the same as other medications prescribed for you. Read the directions carefully, and ask your doctor or other care provider to review them with you.

## 2017-04-24 NOTE — NURSING NOTE
"Chief Complaint   Patient presents with     RECHECK     2 month follow up Nonischemic cardiomyopathy with ejection fraction of 10% on last ECHO. Discuss Cardiac MRI results from 4/17/17. Feeling well, still having some rib pain- not wearing Life vest today but does wear  it when he is alone.       Initial BP 93/57 (BP Location: Left arm, Patient Position: Chair, Cuff Size: Adult Regular)  Pulse 86  SpO2 96% Estimated body mass index is 23.65 kg/(m^2) as calculated from the following:    Height as of 1/9/17: 5' 7\" (1.702 m).    Weight as of 1/25/17: 151 lb (68.5 kg)..  BP completed using cuff size: regular    Estrella August CMA    "

## 2017-04-25 NOTE — NURSING NOTE
Med Reconcile: Reviewed and verified all current medications with the patient. The updated medication list was printed and given to the patient.  Return Appointment: Patient given instructions regarding scheduling next clinic visit. Patient demonstrated understanding of this information and agreed to call with further questions or concerns.  Establish care with Dr. Cardona (or any other provider for heart failure)  EP Procedure: Patient given instructions regarding  ICD implant Discussed purpose, preparation, and procedure with the patient. Patient demonstrated understanding of this information and agreed to call with further questions or concerns.  Single chamber [employer] implant no [place] -orders placed and message sent to Emma Waggoner to schedule procedure.  Patient stated he understood all health information given and agreed to call with further questions or concerns.  Leslie Larios RN

## 2017-05-05 ENCOUNTER — PRE VISIT (OUTPATIENT)
Dept: CARDIOLOGY | Facility: CLINIC | Age: 52
End: 2017-05-05

## 2017-05-05 DIAGNOSIS — R07.81 RIB PAIN: ICD-10-CM

## 2017-05-05 RX ORDER — OXYCODONE AND ACETAMINOPHEN 5; 325 MG/1; MG/1
1 TABLET ORAL 2 TIMES DAILY PRN
Qty: 28 TABLET | Refills: 0 | Status: CANCELLED | OUTPATIENT
Start: 2017-05-05

## 2017-05-05 NOTE — TELEPHONE ENCOUNTER
HPI:  REASON FOR VISIT: The patient comes back for followup of nonischemic cardiomyopathy.       HISTORY OF PRESENT ILLNESS: Mr. Akshat Fragoso is a 52-year-old gentleman who was recently diagnosed with nonischemic cardiomyopathy with an ejection fraction of 10%. The patient's last visit with me was on 2017. The patient is currently on lisinopril 10 mg once daily and Toprol-XL 50 mg once daily. He is currently wearing a LifeVest for sudden cardiac death prevention.       The patient's functional status has continued to improve. Unfortunately, the repeat cardiac MRI shows that his left ventricular ejection fraction remains at 12%. He denies any symptoms of orthopnea, PND or any lower extremity swelling.     ASSESSMENT & PLAN:  Nonischemic cardiomyopathy with ejection fraction of 12%.       I discussed extensively with the patient and his wife this complex medical situation. Despite medical therapy, patient's ejection fraction remains low at less than 35%. I discussed the aims, risks, and alternatives to implantation of a single-chamber ICD for sudden cardiac death prevention. I explained that the risks of major problems associated with implant day for implantation of an ICD include but are not limited to, vascular injury, excessive bleeding requiring blood transfusion, pocket hematoma, pneumothorax requiring insertion of the chest tube, cardiac tamponade requiring pericardiocentesis or open heart surgery, stroke or thromboembolic problems, dislodgement of the leads, infection of the device and inappropriate shocks. After an extensive discussion, the patient would like to proceed with implantation of a single-chamber ICD.       We will schedule implantation of a single-chamber [employer] ICD.       We will also refer patient to Dr. Channing Cardona for advanced heart failure treatment.       All questions and concerns were addressed, and patient is happy     Last EC2017  tachycardia with occasional  Premature ventricular complexes  Left axis deviation  Incomplete left bundle branch block  ST & T wave abnormality, consider lateral ischemia  Abnormal ECG  Unconfirmed report - interpretation of this ECG is computer generated - see medical record for final interpretation    Last ECHOCARDIOGRAM: 1/9/2017  Interpretation Summary  Severe left ventricular dilation is present. Severe diffuse hypokinesis is  present. The Ejection Fraction is estimated at 10-15%. Traced at 13%.  Mild right ventricular dilation is present. Global right ventricular function  is mildly reduced.  Left ventricular diastolic function is indeterminate.  Dilation of the inferior vena cava is present with abnormal respiratory  variation in diameter.  ______________________________________________________________________________           Left Ventricle  Severe left ventricular dilation is present. Severely (EF <30%) reduced left  ventricular function is present. The Ejection Fraction is estimated at 10-  15%. Left ventricular diastolic function is indeterminate. Severe diffuse  hypokinesis is present.     Right Ventricle  Mild right ventricular dilation is present. Global right ventricular function  is mildly reduced.  Atria  Moderate left atrial enlargement is present. Mild right atrial enlargement is  present.     Mitral Valve  Mild mitral annular calcification is present. Trace to mild mitral  insufficiency is present.     Aortic Valve  Aortic valve is normal in structure and function.     Tricuspid Valve  The tricuspid valve is normal. The right ventricular systolic pressure is  approximated at 19.4 mmHg plus the right atrial pressure. Trace tricuspid  insufficiency is present.     Pulmonic Valve  The pulmonic valve is normal.     Vessels  The thoracic aorta is normal. Dilation of the inferior vena cava is present  with abnormal respiratory variation in diameter. Estimated right atrial  pressure is 10-15 mmHg.  Pericardium  Trivial pericardial  effusion is present.     Compared to Previous Study  Previous study not available for comparison.     ______________________________________________________________________________  MMode/2D Measurements & Calculations  IVSd: 0.95 cm  LVIDd: 7.2 cm  LVIDs: 6.7 cm  LVPWd: 0.67 cm  FS: 6.9 %  EDV(Teich): 274.3 ml  ESV(Teich): 233.4 ml  LV mass(C)d: 263.3 grams  Ao root diam: 3.3 cm  asc Aorta Diam: 3.1 cm  LVOT diam: 2.1 cm  LVOT area: 3.6 cm2  LA Volume (BP): 82.0 ml     LA Volume Index (BP): 44.6 ml/m2        Doppler Measurements & Calculations  MV E max jesús: 43.9 cm/sec  MV A max jesús: 19.7 cm/sec  MV E/A: 2.2  MV dec time: 0.29 sec  PA acc time: 0.09 sec  TR max jesús: 220.4 cm/sec  TR max P.4 mmHg  Lateral E/e': 7.9  Medial E/e': 10.9    Last CARDIAC MRI: 2017  IMPRESSION:  1. Severely dilated left ventricle with severely reduced global  function with a calculated ejection fraction of 12 %.  2. Moderately dilated right ventricle with moderately reduced global  function with a calculated ejection fraction of 30%.   3. There is mild mitral and tricuspid regurgitation.  4. The left atrium is severely dilated. The right atrium is mildly  dilated.  5. On delayed enhancement imaging, there is extensive  hyperenhancement in multiple non-ischemic patterns: transmural,  mid-myocardial, subendocardial, and epicardial. 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 The extent and  distribution of hyperenhancement are unchanged from the prior study.  6. Compared with the prior study dated 2017, right ventricular  function has improved.      The MRI sequences and imaging planes in this study were tailored for  cardiac imaging and are suboptimal for evaluation of non-cardiac  structures.     FINDINGS     Left ventricle  Cavity size: Severely dilated  Wall thickness: Thinned  Global systolic function: Severely depressed with a quantitative LV  ejection fraction of 12%.   Regional wall motion:  Normal  Delayed enhancement: There is extensive hyperenhancement in multiple  non-ischemic patterns: transmural, mid-myocardial, subendocardial, and  epicardial. The pattern is consistent with an inflammatory  cardiomyopathy. The extent and distribution of hyperenhancement are  unchanged from the prior study.     Right ventricle  Cavity size: Severely dilated  Wall thickness: Normal  Global systolic function: Moderately depressed with a quantitative RV  ejection fraction of 30%.   Regional wall motion: Normal     Atria  LA size: Severely dilated  RA size: Mildly dilated     Valves  Aortic Valve  Valve morphology: Tricuspid  Aortic stenosis: None  Aortic regurgitation: None     Mitral Valve  Mitral stenosis: None  Mitral regurgitation: Mild     Tricuspid Valve   Tricuspid stenosis: None  Tricuspid regurgitation: Mild     Pulmonic Valve  Pulmonic stenosis: None  Pulmonic regurgitation: None     Extracardiac  Pericardial thickness: Normal  Pericardial effusion:None  Pleural effusion: None     Measurements  LV volumes absolute and indexed to BSA with age and gender-matched  normal values in parentheses (mean, 95% CI) are listed below:     EDV: 390 ml (154, 113-196 mL)  EDVI: 219 ml/m2 (79, 62-97 mL/m2)  ESV: 345 ml (51,29-74 mL)  ESVI: 194 ml/m2 (26, 15-37 mL/m2)     RV volumes absolute and indexed to BSA with age and gender-matched  normal values in parentheses (mean, 95% CI) are listed below:     EDV: 223 ml (160, 111-210 mL)  EDVI: 125 ml/m2 (82,  mL/m2)  ESV: 156 ml (55,25-85 mL)  ESVI: 88 ml/m2 (28, 13-42 mL/m2)     LV wall thickness - Anteroseptal: 0.7 cm  LV wall thickness - Inferolateral: 0.7 cm  LV end-diastolic diameter: 8.2 cm  LV end-systolic diameter: 7.2 cm  LA katiuska-posterior diameter:4.2 cm     Sedation and contrast  Sedation used: No  Contrast agent: Gadavist  Volume administered: 13.5 mL     Pulse sequences used  SSFP Localizers  SSFP Cine  IR SSFP Single Shot  IR GRE  Segmented  Contrast-enhanced early and late enhancement imaging  Image post-processing was performed on a [employer] workstation     Last CATH LAB REPORT: 1/11/2017  See scanned in file in Cardiology tab.

## 2017-05-12 ENCOUNTER — OFFICE VISIT (OUTPATIENT)
Dept: CARDIOLOGY | Facility: CLINIC | Age: 52
End: 2017-05-12
Payer: COMMERCIAL

## 2017-05-12 ENCOUNTER — RADIANT APPOINTMENT (OUTPATIENT)
Dept: GENERAL RADIOLOGY | Facility: CLINIC | Age: 52
End: 2017-05-12
Payer: COMMERCIAL

## 2017-05-12 VITALS — DIASTOLIC BLOOD PRESSURE: 70 MMHG | OXYGEN SATURATION: 95 % | HEART RATE: 85 BPM | SYSTOLIC BLOOD PRESSURE: 124 MMHG

## 2017-05-12 DIAGNOSIS — R07.81 RIB PAIN: ICD-10-CM

## 2017-05-12 DIAGNOSIS — I50.9 CHRONIC CONGESTIVE HEART FAILURE, UNSPECIFIED CONGESTIVE HEART FAILURE TYPE: ICD-10-CM

## 2017-05-12 PROCEDURE — 71111 X-RAY EXAM RIBS/CHEST4/> VWS: CPT

## 2017-05-12 PROCEDURE — 99214 OFFICE O/P EST MOD 30 MIN: CPT | Performed by: INTERNAL MEDICINE

## 2017-05-12 RX ORDER — LISINOPRIL 5 MG/1
5 TABLET ORAL 2 TIMES DAILY
Qty: 180 TABLET | Refills: 3 | Status: SHIPPED | OUTPATIENT
Start: 2017-05-12 | End: 2018-05-21

## 2017-05-12 RX ORDER — SPIRONOLACTONE 25 MG/1
12.5 TABLET ORAL DAILY
Qty: 45 TABLET | Refills: 3 | Status: SHIPPED | OUTPATIENT
Start: 2017-05-12 | End: 2018-03-21

## 2017-05-12 RX ORDER — CARVEDILOL 6.25 MG/1
6.25 TABLET ORAL 2 TIMES DAILY WITH MEALS
Qty: 90 TABLET | Refills: 3 | Status: SHIPPED | OUTPATIENT
Start: 2017-05-12 | End: 2018-02-11

## 2017-05-12 ASSESSMENT — PAIN SCALES - GENERAL: PAINLEVEL: SEVERE PAIN (6)

## 2017-05-12 NOTE — NURSING NOTE
"Chief Complaint   Patient presents with     Heart Problem     referral from Dr. Moy for advanced CHF, Sched for ICD implant in June.  States not having any new sx, still having very bad side and rib worse with wearing the vest and pain increases with coughing.       Initial /70 (BP Location: Right arm, Patient Position: Chair, Cuff Size: Adult Regular)  Pulse 85  SpO2 95% Estimated body mass index is 23.65 kg/(m^2) as calculated from the following:    Height as of 1/9/17: 5' 7\" (1.702 m).    Weight as of 1/25/17: 151 lb (68.5 kg)..  BP completed using cuff size: regular    Estrella August CMA    "

## 2017-05-12 NOTE — PATIENT INSTRUCTIONS
1.  Dr. Cardona has requested the following medication changes:    -increase your lisinopril 5mg to TWICE a day  -Start Spironolactone 12.5mg per day (1/2 tablet)  -Stopping Metoprolol   -Starting Coreg 6.25mg    2. With the Coreg, you are to follow this titration schedule:   -take 6.25mg TWICE per day for 2 weeks (today until May 26th)   -take 6.25mg in the AM and 9.375mg in the PM for 2 weeks (May 27th-June 10th)  -take 9.375mg TWICE per day for 2 weeks (June 11th-June 25th)  -take 9.375mg in AM and 12.5mg in the PM for 2 weeks (June 26th-July 10th)  -take 12.5mg TWICE a day for 2 weeks (July 11th-July 25th)    3. Your goal Blood Pressure should be 100/65 and pulse of 60-65.     4. You are scheduled for a lab draw at the [facility] on Thursday, May 25th at 10:15am.    5. Once you are completed with the titration, Dr. Cardona would like to see you again in clinic. This is scheduled for Friday, August 4th at 4:30pm at the [facility].    6. Your appointment with Dr. Cardona on June 16th has been cancelled.    [facility] Cardiology - [place] Location    If you have any questions regarding to your visit please contact your care team:     Cardiology  Telephone Number   Leslie MaoajithYao lisamatilda Amezcua  Cardiology RN's.    Halie August CMA [phone]    After hours: [phone].  [phone]   For scheduling appts:     [phone] or  [phone]    After hours: [phone]   For the Device Clinic (Pacemakers and ICD's)  RN's :  Alexandra Vu   During business hours: [phone]  After business hours:  [phone]- select option 4.      If you need a medication refill please contact your pharmacy.  Please allow 3 business days for your refill to be completed.    As always, Thank you for trusting us with your health care needs!  _____________________________________________________________________      Patient Education    Carvedilol Oral capsule, extended-release    Carvedilol Oral  tablet  Carvedilol Oral tablet  What is this medicine?  CARVEDILOL (RUPALI ve dil ol) is a beta-blocker. Beta-blockers reduce the workload on the heart and help it to beat more regularly. This medicine is used to treat high blood pressure and heart failure.  This medicine may be used for other purposes; ask your health care provider or pharmacist if you have questions.  What should I tell my health care provider before I take this medicine?  They need to know if you have any of these conditions:    circulation problems    diabetes    history of heart attack or heart disease    liver disease    lung or breathing disease, like asthma or emphysema    pheochromocytoma    slow or irregular heartbeat    thyroid disease    an unusual or allergic reaction to carvedilol, other beta-blockers, medicines, foods, dyes, or preservatives    pregnant or trying to get pregnant    breast-feeding  How should I use this medicine?  Take this medicine by mouth with a glass of water. Follow the directions on the prescription label. It is best to take the tablets with food. Take your doses at regular intervals. Do not take your medicine more often than directed. Do not stop taking except on the advice of your doctor or health care professional.  Talk to your pediatrician regarding the use of this medicine in children. Special care may be needed.  Overdosage: If you think you have taken too much of this medicine contact a poison control center or emergency room at once.  NOTE: This medicine is only for you. Do not share this medicine with others.  What if I miss a dose?  If you miss a dose, take it as soon as you can. If it is almost time for your next dose, take only that dose. Do not take double or extra doses.  What may interact with this medicine?  This medicine may interact with the following medications:    certain medicines for blood pressure, heart disease, irregular heart beat    certain medicines for depression, like fluoxetine or  paroxetine    certain medicines for diabetes, like glipizide or glyburide    cimetidine    clonidine    cyclosporine    digoxin    MAOIs like Carbex, Eldepryl, Marplan, Nardil, and Parnate    reserpine    rifampin  This list may not describe all possible interactions. Give your health care provider a list of all the medicines, herbs, non-prescription drugs, or dietary supplements you use. Also tell them if you smoke, drink alcohol, or use illegal drugs. Some items may interact with your medicine.  What should I watch for while using this medicine?  Check your heart rate and blood pressure regularly while you are taking this medicine. Ask your doctor or health care professional what your heart rate and blood pressure should be, and when you should contact him or her. Do not stop taking this medicine suddenly. This could lead to serious heart-related effects.  Contact your doctor or health care professional if you have difficulty breathing while taking this drug.  Check your weight daily. Ask your doctor or health care professional when you should notify him/her of any weight gain.  You may get drowsy or dizzy. Do not drive, use machinery, or do anything that requires mental alertness until you know how this medicine affects you. To reduce the risk of dizzy or fainting spells, do not sit or stand up quickly. Alcohol can make you more drowsy, and increase flushing and rapid heartbeats. Avoid alcoholic drinks.  If you have diabetes, check your blood sugar as directed. Tell your doctor if you have changes in your blood sugar while you are taking this medicine.  If you are going to have surgery, tell your doctor or health care professional that you are taking this medicine.  What side effects may I notice from receiving this medicine?  Side effects that you should report to your doctor or health care professional as soon as possible:    allergic reactions like skin rash, itching or hives, swelling of the face, lips, or  tongue    breathing problems    dark urine    irregular heartbeat    swollen legs or ankles    vomiting    yellowing of the eyes or skin  Side effects that usually do not require medical attention (report to your doctor or health care professional if they continue or are bothersome):    change in sex drive or performance    diarrhea    dry eyes (especially if wearing contact lenses)    dry, itching skin    headache    nausea    unusually tired  This list may not describe all possible side effects. Call your doctor for medical advice about side effects. You may report side effects to FDA at 3-279-PPR-8827.  Where should I keep my medicine?  Keep out of the reach of children.  Store at room temperature below 30 degrees C (86 degrees F). Protect from moisture. Keep container tightly closed. Throw away any unused medicine after the expiration date.  NOTE:This sheet is a summary. It may not cover all possible information. If you have questions about this medicine, talk to your doctor, pharmacist, or health care provider. Copyright  2016 [employer]        Spironolactone Oral tablet  What is this medicine?  SPIRONOLACTONE (celio on oh LAK tone) is a diuretic. It helps you make more urine and to lose excess water from your body. This medicine is used to treat high blood pressure, and edema or swelling from heart, kidney, or liver disease. It is also used to treat patients who make too much aldosterone or have low potassium.  This medicine may be used for other purposes; ask your health care provider or pharmacist if you have questions.  What should I tell my health care provider before I take this medicine?  They need to know if you have any of these conditions:    high blood level of potassium    kidney disease or trouble making urine    liver disease    an unusual or allergic reaction to spironolactone, other medicines, foods, dyes, or preservatives    pregnant or trying to get pregnant    breast-feeding  How should I  use this medicine?  Take this medicine by mouth with a drink of water. Follow the directions on your prescription label. You can take it with or without food. If it upsets your stomach, take it with food. Do not take your medicine more often than directed. Remember that you will need to pass more urine after taking this medicine. Do not take your doses at a time of day that will cause you problems. Do not take at bedtime.  Talk to your pediatrician regarding the use of this medicine in children. While this drug may be prescribed for selected conditions, precautions do apply.  Overdosage: If you think you have taken too much of this medicine contact a poison control center or emergency room at once.  NOTE: This medicine is only for you. Do not share this medicine with others.  What if I miss a dose?  If you miss a dose, take it as soon as you can. If it is almost time for your next dose, take only that dose. Do not take double or extra doses.  What may interact with this medicine?  Do not take this medicine with any of the following medications:    eplerenone  This medicine may also interact with the following medications:    corticosteroids    digoxin    lithium    medicines for high blood pressure like ACE inhibitors    skeletal muscle relaxants like tubocurarine    NSAIDs, medicines for pain and inflammation, like ibuprofen or naproxen    potassium products like salt substitute or supplements    pressor amines like norepinephrine    some diuretics  This list may not describe all possible interactions. Give your health care provider a list of all the medicines, herbs, non-prescription drugs, or dietary supplements you use. Also tell them if you smoke, drink alcohol, or use illegal drugs. Some items may interact with your medicine.  What should I watch for while using this medicine?  Visit your doctor or health care professional for regular checks on your progress. Check your blood pressure as directed. Ask your  doctor what your blood pressure should be, and when you should contact them.  You may need to be on a special diet while taking this medicine. Ask your doctor. Also, ask how many glasses of fluid you need to drink a day. You must not get dehydrated.  This medicine may make you feel confused, dizzy or lightheaded. Drinking alcohol and taking some medicines can make this worse. Do not drive, use machinery, or do anything that needs mental alertness until you know how this medicine affects you. Do not sit or stand up quickly.  What side effects may I notice from receiving this medicine?  Side effects that you should report to your doctor or health care professional as soon as possible:    allergic reactions such as skin rash or itching, hives, swelling of the lips, mouth, tongue, or throat    black or tarry stools    fast, irregular heartbeat    fever    muscle pain, cramps    numbness, tingling in hands or feet    trouble breathing    trouble passing urine    unusual bleeding    unusually weak or tired  Side effects that usually do not require medical attention (report to your doctor or health care professional if they continue or are bothersome):    change in voice or hair growth    confusion    dizzy, drowsy    dry mouth, increased thirst    enlarged or tender breasts    headache    irregular menstrual periods    sexual difficulty, unable to have an erection    stomach upset  This list may not describe all possible side effects. Call your doctor for medical advice about side effects. You may report side effects to FDA at 2-015-FDA-1677.  Where should I keep my medicine?  Keep out of the reach of children.  Store below 25 degrees C (77 degrees F). Throw away any unused medicine after the expiration date.  NOTE:This sheet is a summary. It may not cover all possible information. If you have questions about this medicine, talk to your doctor, pharmacist, or health care provider. Copyright  2016 [employer]

## 2017-05-12 NOTE — LETTER
5/12/2017    RE: Akshat Fragoso  [address]  [facility] [address]       Dear Colleague,    Thank you for the opportunity to participate in the care of your patient, Akshat Fragoso, at the [facility] at [facility]. Please see a copy of my visit note below.    CARDIOLOGY NEW OFFICE VISIT    HPI: Akshat Fragoso is a 52 year old male who is seen in clinic today accompanied by his wife. He is referred by Dr. Brynn Moy for evaluation of advanced treatment of CHF. Briefly, he was recently diagnosed with nonischemic cardiomyopathy and LVEF of 9% and RVEF of 18% by CMRI. A follow up CMRI shows an LVEF of 12% and RVEF of 30% despite subjective improvement in the degree of limitations with ADLs. He denies chest discomfort, dyspnea, PND, orthopnea, pedal edema, palpitations, lightheadedness, and syncope.    PAST MEDICAL HISTORY:  Past Medical History:   Diagnosis Date     CARDIOVASCULAR SCREENING; LDL GOAL LESS THAN 160 5/9/2010     Pneumonia 11/2011       CURRENT MEDICATIONS:  Current Outpatient Prescriptions   Medication Sig Dispense Refill     aspirin 81 MG tablet Take 81 mg by mouth daily       metoprolol (TOPROL-XL) 50 MG 24 hr tablet Take 1 tablet (50 mg) by mouth daily 30 tablet 3     lisinopril (PRINIVIL/ZESTRIL) 10 MG tablet Take 1 tablet (10 mg) by mouth daily 30 tablet 3     oxyCODONE-acetaminophen (PERCOCET) 5-325 MG per tablet Take 1 tablet by mouth every 6 hours as needed for pain . Use sparingly only for severe pain. 56 tablet 0     furosemide (LASIX) 20 MG tablet Take 1 tablet (20 mg) by mouth daily as needed Only take if weight up by 3lbs 60 tablet 1     oxyCODONE-acetaminophen (PERCOCET) 5-325 MG per tablet Take 1 tablet by mouth 2 times daily as needed for pain maximum 2 tablet(s) per day 28 tablet 0     oxyCODONE-acetaminophen (PERCOCET) 5-325 MG per tablet Take 1 tablet by mouth 2 times daily as needed for pain 10  tablet 0     oxyCODONE-acetaminophen (PERCOCET) 5-325 MG per tablet Take one tablet twice a day as needed for pain for 2 weeks and then 1 tablet per day for 4 weeks and then stop. 44 tablet 0       PAST SURGICAL HISTORY:  Past Surgical History:   Procedure Laterality Date     None         ALLERGIES  No known allergies    FAMILY HX:  History reviewed. No pertinent family history.    SOCIAL HX:  Social History     Social History     Marital status:      Spouse name: Cheryl     Number of children: 2     Years of education: N/A     Occupational History     Construction  Self     Social History Main Topics     Smoking status: Former Smoker     Packs/day: 0.50     Years: 15.00     Types: Cigarettes     Quit date: 12/1/2016     Smokeless tobacco: Former User     Quit date: 10/24/2011     Alcohol use No     Drug use: No     Sexual activity: Yes     Partners: Female     Birth control/ protection: Condom     Other Topics Concern     Parent/Sibling W/ Cabg, Mi Or Angioplasty Before 65f 55m? Yes     both parents had stints placed      Social History Narrative       ROS:  Constitutional: No fever, chills, or sweats. No weight gain/loss.   ENT: No visual disturbance, ear ache, epistaxis, sore throat.   Allergies/Immunologic: Negative.   Respiratory: No cough, hemoptysis.   Cardiovascular: As per HPI.   GI: No nausea, vomiting, hematemesis, melena, or hematochezia.   : No urinary frequency, dysuria, or hematuria.   Integument: Negative.   Psychiatric: Negative.   Neuro: Negative.   Endocrinology: Negative.   Musculoskeletal: No myalgia.    VITAL SIGNS:  /70 (BP Location: Right arm, Patient Position: Chair, Cuff Size: Adult Regular)  Pulse 85  SpO2 95%  There is no height or weight on file to calculate BMI.  Wt Readings from Last 2 Encounters:   01/25/17 151 lb (68.5 kg)   01/23/17 151 lb (68.5 kg)       PHYSICAL EXAM  Akshat Fragoso is a 52 year old male in no acute distress.  HEENT: Unremarkable.  Neck: JVP  normal.  Carotids +4/4 bilaterally without bruits.  Lungs: CTA.  Cor: RRR. Normal S1 and S2.  No murmur, rub, or gallop.  PMI in Lf 5th ICS.  Abd: Soft, nontender, nondistended.  NABS.  No pulsatile mass.  Extremities: No C/C/E.  Pulses +4/4 symmetric in upper and lower extremities.  Neuro: Grossly intact.    LABS    Lab Results   Component Value Date    WBC 11.9 2017     Lab Results   Component Value Date    RBC 5.34 2017     Lab Results   Component Value Date    HGB 16.4 2017     Lab Results   Component Value Date    HCT 51.1 2017     No components found for: MCT  Lab Results   Component Value Date    MCV 96 2017     Lab Results   Component Value Date    MCH 30.7 2017     Lab Results   Component Value Date    MCHC 32.1 2017     Lab Results   Component Value Date    RDW 13.6 2017     Lab Results   Component Value Date     2017      Recent Labs   Lab Test  17   0655  17   1735   NA  139  138   POTASSIUM  4.2  4.2   CHLORIDE  103  105   CO2  31  21   ANIONGAP  5  12   GLC  91  90   BUN  12  16   CR  0.85  0.94   TONY  8.6  8.5     Recent Labs   Lab Test  01/10/17   0718   CHOL  124   HDL  39*   LDL  64   TRIG  103   NHDL  84     EC2017  Sinus tachycardia with PACs. Incomplete LBBB.    ECHOCARDIOGRAM: 2017  Severe left ventricular dilation is present. Severe diffuse hypokinesis is present. The Ejection Fraction is estimated at 10-15%. Traced at 13%.  Mild right ventricular dilation is present. Global right ventricular function is mildly reduced.  Left ventricular diastolic function is indeterminate.  Dilation of the inferior vena cava is present with abnormal respiratory variation in diameter.     Left Ventricle  Severe left ventricular dilation is present. Severely (EF <30%) reduced left ventricular function is present. The Ejection Fraction is estimated at 10-15%. Left ventricular diastolic function is indeterminate. Severe diffuse  hypokinesis is present.     Right Ventricle  Mild right ventricular dilation is present. Global right ventricular function  is mildly reduced.  Atria  Moderate left atrial enlargement is present. Mild right atrial enlargement is  present.     Mitral Valve  Mild mitral annular calcification is present. Trace to mild mitral  insufficiency is present.     Aortic Valve  Aortic valve is normal in structure and function.     Tricuspid Valve  The tricuspid valve is normal. The right ventricular systolic pressure is approximated at 19.4 mmHg plus the right atrial pressure. Trace tricuspid  insufficiency is present.     Pulmonic Valve  The pulmonic valve is normal.     Vessels  The thoracic aorta is normal. Dilation of the inferior vena cava is present with abnormal respiratory variation in diameter. Estimated right atrial pressure is 10-15 mmHg.    Pericardium  Trivial pericardial effusion is present.     Compared to Previous Study  Previous study not available for comparison.     IVSd: 0.95 cm  LVIDd: 7.2 cm  LVIDs: 6.7 cm  LVPWd: 0.67 cm  FS: 6.9 %  EDV(Teich): 274.3 ml  ESV(Teich): 233.4 ml  LV mass(C)d: 263.3 grams  Ao root diam: 3.3 cm  asc Aorta Diam: 3.1 cm  LVOT diam: 2.1 cm  LVOT area: 3.6 cm2  LA Volume (BP): 82.0 ml  LA Volume Index (BP): 44.6 ml/m2  MV E max jesús: 43.9 cm/sec  MV A max jesús: 19.7 cm/sec  MV E/A: 2.2  MV dec time: 0.29 sec  PA acc time: 0.09 sec  TR max jesús: 220.4 cm/sec  TR max P.4 mmHg  Lateral E/e': 7.9  Medial E/e': 10.9    CORONARY ANGIOGRAPHY: 2017  No significant coronary artery disease.  Non-ischemic cardiomyopathy.    MR CARDIAC W CONTRAST: 2017      Cardiac MRI with and without contrast was performed on a 1.5 T scanner to evaluate myocardial morphology, function and viability, and to  assess for etiology of cardiomyopathy in a 51-year-old male with newly diagnosed cardiomyopathy.     1. The left ventricle is severely dilated in cavity size. There is global thinning. The  global systolic function is severely reduced. The  quantitative LV ejection fraction is 9%. There is severe global hypokinesis with akinesis of the septal segments.     LV volumes absolute and indexed to BSA with age and gender-matched normal values in parentheses are listed below:     EDV: 426 ml (159, 113-196 ml)  ESV: 388 ml (54, 31-76 ml)  EDVI: 239 ml/m2 (81, 64-99 ml/m2)  ESVI: 218 ml/m2 (27, 17-38 ml/m2)     2. The right ventricle is moderately dilated in cavity size. The global systolic function is severely reduced. The quantitative RV  ejection fraction is 18%.     RV volumes absolute and indexed to BSA with age and gender-matched normal values in parentheses are listed below:     EDV: 268 ml (166, 116-216 ml)  ESV: 221 ml (59, 29-89 ml)  EDVI: 151 ml/m2 (85,  ml/m2)  ESVI: 124 ml/m2 (30, 16-45 ml/m2)     3. The left atrium is severely dilated. The right atrium is mildly dilated.     4. The aortic valve is trileaflet in morphology. There is no significant aortic valve stenosis or regurgitation.      5. There is mild mitral regurgitation. There is mild tricuspid regurgitation.     6. Delayed enhancement imaging demonstrates extensive areas of delayed enhancement in multiple non-ischemic patterns: transmural,  mid-myocardial, subendocardial and epicardial patterns are seen. This is consistent with a non-ischemic cardiomyopathy of inflammatory  origin such as myocarditis. The extensive involvement with wall thinning and severe left ventricular dysfunction suggests this is more  of a subacute or chronic process.     7. There is no intracardiac thrombus.     8. There is no pericardial effusion.       IMPRESSION:     Severe non-ischemic cardiomyopathy with biventricular involvement. LVEF of 9% and RVEF of 18%. Extensive fibrosis that is suggestive of  an inflammatory etiology to the cardiomyopathy such as a fulminant myocarditis. LV thinning is suggestive of a subacute or chronic  timeline for the  cardiomyopathy (i.e., the myocarditis does not appear to be acute).     Measurements:     LV wall thickness - Anteroseptal: 0.8 cm  LV wall thickness - Inferolateral: 0.6 cm  LV end-diastolic diameter: 8.0 cm  LV end-systolic diameter: 7.8 cm  LA katiuska-posterior diameter: 4.8 cm  Aortic root at the sinuses of Valsalva: 3.4 cm     Contrast:     Contrast agent: MultiHance  Volume administered: 7.5 ml     Pulse sequences used:     SSFP Localizers  SSFP Cine  IR SSFP Single Shot  IR GRE Segmented    MR CARDIAC W CONTRAST: 4/17/2017    Indication: 52 year-old male with cardiomyopathy diagnosed 1/2017 with prior CMR 1/11/17 showing severe biventricular dysfunction  (LVEF=9% RVEF=18%) and extensive fibrosis. CMR to reassess function and fibrosis.   Comparison: Prior cardiac MRI 1/11/2017   Technical quality: adequate. There is significant susceptibility artifact.       IMPRESSION:  1. Severely dilated left ventricle with severely reduced global function with a calculated ejection fraction of 12 %.  2. Moderately dilated right ventricle with moderately reduced global function with a calculated ejection fraction of 30%.   3. There is mild mitral and tricuspid regurgitation.  4. The left atrium is severely dilated. The right atrium is mildly dilated.  5. On delayed enhancement imaging, there is extensive hyperenhancement in multiple non-ischemic patterns: transmural,  mid-myocardial, subendocardial, and epicardial. 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 The extent and  distribution of hyperenhancement are unchanged from the prior study.  6. Compared with the prior study dated 1/11/2017, right ventricular function has improved.      The MRI sequences and imaging planes in this study were tailored for cardiac imaging and are suboptimal for evaluation of non-cardiac  structures.     FINDINGS     Left ventricle  Cavity size: Severely dilated  Wall thickness: Thinned  Global systolic function: Severely  depressed with a quantitative LV ejection fraction of 12%.   Regional wall motion: Normal  Delayed enhancement: There is extensive hyperenhancement in multiple non-ischemic patterns: transmural, mid-myocardial, subendocardial, and  epicardial. 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 The extent and distribution of hyperenhancement are  unchanged from the prior study.     Right ventricle  Cavity size: Severely dilated  Wall thickness: Normal  Global systolic function: Moderately depressed with a quantitative RV ejection fraction of 30%.   Regional wall motion: Normal     Atria  LA size: Severely dilated  RA size: Mildly dilated     Valves  Aortic Valve  Valve morphology: Tricuspid  Aortic stenosis: None  Aortic regurgitation: None     Mitral Valve  Mitral stenosis: None  Mitral regurgitation: Mild     Tricuspid Valve   Tricuspid stenosis: None  Tricuspid regurgitation: Mild     Pulmonic Valve  Pulmonic stenosis: None  Pulmonic regurgitation: None     Extracardiac  Pericardial thickness: Normal  Pericardial effusion:None  Pleural effusion: None     Measurements  LV volumes absolute and indexed to BSA with age and gender-matched normal values in parentheses (mean, 95% CI) are listed below:     EDV: 390 ml (154, 113-196 mL)  EDVI: 219 ml/m2 (79, 62-97 mL/m2)  ESV: 345 ml (51,29-74 mL)  ESVI: 194 ml/m2 (26, 15-37 mL/m2)     RV volumes absolute and indexed to BSA with age and gender-matched normal values in parentheses (mean, 95% CI) are listed below:     EDV: 223 ml (160, 111-210 mL)  EDVI: 125 ml/m2 (82,  mL/m2)  ESV: 156 ml (55,25-85 mL)  ESVI: 88 ml/m2 (28, 13-42 mL/m2)     LV wall thickness - Anteroseptal: 0.7 cm  LV wall thickness - Inferolateral: 0.7 cm  LV end-diastolic diameter: 8.2 cm  LV end-systolic diameter: 7.2 cm  LA katiuska-posterior diameter:4.2 cm     Sedation and contrast  Sedation used: No  Contrast agent: Gadavist  Volume administered: 13.5 mL     Pulse sequences used  SSFP  Localizers  SSFP Cine  IR SSFP Single Shot  IR GRE Segmented  Contrast-enhanced early and late enhancement imaging  Image post-processing was performed on a [employer] workstation    ASSESSMENT AND PLAN:  1. Congestive heart failure, stage C, NYHA class I, LVEF 12%, RVEF 30%, non-ischemic etiology. I have reviewed all imaging and available test results.  2. Euvolemic by clinical assessment.  3. Recommend:  -- Discontinue Toprol XL.  -- Start carvedilol 6.25 mg PO BID for 2 weeks; increase to 6.25 mg PO QAM and 9.375 mg PO QPM for 2 weeks; increase to 9.375 mg PO BID for 2 weeks; increase to 9.375 mg PO QAM and 12.5 mg PO QPM for 2 weeks; increase to 12.5 mg PO BID.  -- Lisinopril 5 mg PO BID.  -- Spironolactone 12.5 mg PO QD.  -- BMP and NT proBNP in 2 weeks.  -- Proceed with AICD placement as previously scheduled.  -- RTC in 6 months. Will uptitrate lisinopril at hat time, and then finally carvedilol once more up to the target dose of 25 mg PO BID if tolerated.    Thank you for your referral. Please do no not hesitate to call me directly should you have any questions regarding this evaluation.    Channing Cardona MD, PhD

## 2017-05-12 NOTE — NURSING NOTE
Labs: Patient was instructed to return for the next laboratory testing in 2 weeks (5-25) for BMP and BNP. Patient demonstrated understanding of this information and agreed to call with further questions or concerns.     Med Reconcile: Reviewed and verified all current medications with the patient. The updated medication list was printed and given to the patient.    New Medication: Patient was educated regarding newly prescribed medication, spironolactone and coreg, including discussion of  the indication, administration, side effects, and when to report to MD or RN. Patient demonstrated understanding of this information and agreed to call with further questions or concerns.    Return Appointment: Patient given instructions regarding scheduling next clinic visit, on 8-4-17 at the [facility] at 4:30pm. Patient demonstrated understanding of this information and agreed to call with further questions or concerns.    Medication Change: Patient was educated regarding prescribed medication change, stopping metoprolol and starting coreg titration schedule, plus starting spironolactone 12.5mg/day and changing lisinopril to 5mg BID, including discussion of the indication, administration, side effects, and when to report to MD or RN. Patient demonstrated understanding of this information and agreed to call with further questions or concerns.    Pt given BP cuff. Goal BP is 100/65, with a pulse of 60-65.    Patient stated he understood all health information given and agreed to call with further questions or concerns.    Henrique Amezcua RN

## 2017-05-12 NOTE — PROGRESS NOTES
CARDIOLOGY NEW OFFICE VISIT    HPI: Akshat Fragoso is a 52 year old male who is seen in clinic today accompanied by his wife. He is referred by Dr. Brynn Moy for evaluation of advanced treatment of CHF. Briefly, he was recently diagnosed with nonischemic cardiomyopathy and LVEF of 9% and RVEF of 18% by CMRI. A follow up [facility]RI shows an LVEF of 12% and RVEF of 30% despite subjective improvement in the degree of limitations with ADLs. He denies chest discomfort, dyspnea, PND, orthopnea, pedal edema, palpitations, lightheadedness, and syncope.    PAST MEDICAL HISTORY:  Past Medical History:   Diagnosis Date     CARDIOVASCULAR SCREENING; LDL GOAL LESS THAN 160 5/9/2010     Pneumonia 11/2011       CURRENT MEDICATIONS:  Current Outpatient Prescriptions   Medication Sig Dispense Refill     aspirin 81 MG tablet Take 81 mg by mouth daily       metoprolol (TOPROL-XL) 50 MG 24 hr tablet Take 1 tablet (50 mg) by mouth daily 30 tablet 3     lisinopril (PRINIVIL/ZESTRIL) 10 MG tablet Take 1 tablet (10 mg) by mouth daily 30 tablet 3     oxyCODONE-acetaminophen (PERCOCET) 5-325 MG per tablet Take 1 tablet by mouth every 6 hours as needed for pain . Use sparingly only for severe pain. 56 tablet 0     furosemide (LASIX) 20 MG tablet Take 1 tablet (20 mg) by mouth daily as needed Only take if weight up by 3lbs 60 tablet 1     oxyCODONE-acetaminophen (PERCOCET) 5-325 MG per tablet Take 1 tablet by mouth 2 times daily as needed for pain maximum 2 tablet(s) per day 28 tablet 0     oxyCODONE-acetaminophen (PERCOCET) 5-325 MG per tablet Take 1 tablet by mouth 2 times daily as needed for pain 10 tablet 0     oxyCODONE-acetaminophen (PERCOCET) 5-325 MG per tablet Take one tablet twice a day as needed for pain for 2 weeks and then 1 tablet per day for 4 weeks and then stop. 44 tablet 0       PAST SURGICAL HISTORY:  Past Surgical History:   Procedure Laterality Date     None         ALLERGIES  No known allergies    FAMILY HX:  History reviewed. No  pertinent family history.    SOCIAL HX:  Social History     Social History     Marital status:      Spouse name: Cheryl     Number of children: 2     Years of education: N/A     Occupational History     Construction  Self     Social History Main Topics     Smoking status: Former Smoker     Packs/day: 0.50     Years: 15.00     Types: Cigarettes     Quit date: 12/1/2016     Smokeless tobacco: Former User     Quit date: 10/24/2011     Alcohol use No     Drug use: No     Sexual activity: Yes     Partners: Female     Birth control/ protection: Condom     Other Topics Concern     Parent/Sibling W/ Cabg, Mi Or Angioplasty Before 65f 55m? Yes     both parents had stints placed      Social History Narrative       ROS:  Constitutional: No fever, chills, or sweats. No weight gain/loss.   ENT: No visual disturbance, ear ache, epistaxis, sore throat.   Allergies/Immunologic: Negative.   Respiratory: No cough, hemoptysis.   Cardiovascular: As per HPI.   GI: No nausea, vomiting, hematemesis, melena, or hematochezia.   : No urinary frequency, dysuria, or hematuria.   Integument: Negative.   Psychiatric: Negative.   Neuro: Negative.   Endocrinology: Negative.   Musculoskeletal: No myalgia.    VITAL SIGNS:  /70 (BP Location: Right arm, Patient Position: Chair, Cuff Size: Adult Regular)  Pulse 85  SpO2 95%  There is no height or weight on file to calculate BMI.  Wt Readings from Last 2 Encounters:   01/25/17 151 lb (68.5 kg)   01/23/17 151 lb (68.5 kg)       PHYSICAL EXAM  Akshat Fragoso is a 52 year old male in no acute distress.  HEENT: Unremarkable.  Neck: JVP normal.  Carotids +4/4 bilaterally without bruits.  Lungs: CTA.  Cor: RRR. Normal S1 and S2.  No murmur, rub, or gallop.  PMI in Lf 5th ICS.  Abd: Soft, nontender, nondistended.  NABS.  No pulsatile mass.  Extremities: No C/C/E.  Pulses +4/4 symmetric in upper and lower extremities.  Neuro: Grossly intact.    LABS    Lab Results   Component Value Date     WBC 11.9 2017     Lab Results   Component Value Date    RBC 5.34 2017     Lab Results   Component Value Date    HGB 16.4 2017     Lab Results   Component Value Date    HCT 51.1 2017     No components found for: MCT  Lab Results   Component Value Date    MCV 96 2017     Lab Results   Component Value Date    MCH 30.7 2017     Lab Results   Component Value Date    MCHC 32.1 2017     Lab Results   Component Value Date    RDW 13.6 2017     Lab Results   Component Value Date     2017      Recent Labs   Lab Test  17   0655  17   1735   NA  139  138   POTASSIUM  4.2  4.2   CHLORIDE  103  105   CO2  31  21   ANIONGAP  5  12   GLC  91  90   BUN  12  16   CR  0.85  0.94   TONY  8.6  8.5     Recent Labs   Lab Test  01/10/17   0718   CHOL  124   HDL  39*   LDL  64   TRIG  103   NHDL  84     EC2017  Sinus tachycardia with PACs. Incomplete LBBB.    ECHOCARDIOGRAM: 2017  Severe left ventricular dilation is present. Severe diffuse hypokinesis is present. The Ejection Fraction is estimated at 10-15%. Traced at 13%.  Mild right ventricular dilation is present. Global right ventricular function is mildly reduced.  Left ventricular diastolic function is indeterminate.  Dilation of the inferior vena cava is present with abnormal respiratory variation in diameter.     Left Ventricle  Severe left ventricular dilation is present. Severely (EF <30%) reduced left ventricular function is present. The Ejection Fraction is estimated at 10-15%. Left ventricular diastolic function is indeterminate. Severe diffuse hypokinesis is present.     Right Ventricle  Mild right ventricular dilation is present. Global right ventricular function  is mildly reduced.  Atria  Moderate left atrial enlargement is present. Mild right atrial enlargement is  present.     Mitral Valve  Mild mitral annular calcification is present. Trace to mild mitral  insufficiency is  present.     Aortic Valve  Aortic valve is normal in structure and function.     Tricuspid Valve  The tricuspid valve is normal. The right ventricular systolic pressure is approximated at 19.4 mmHg plus the right atrial pressure. Trace tricuspid  insufficiency is present.     Pulmonic Valve  The pulmonic valve is normal.     Vessels  The thoracic aorta is normal. Dilation of the inferior vena cava is present with abnormal respiratory variation in diameter. Estimated right atrial pressure is 10-15 mmHg.    Pericardium  Trivial pericardial effusion is present.     Compared to Previous Study  Previous study not available for comparison.     IVSd: 0.95 cm  LVIDd: 7.2 cm  LVIDs: 6.7 cm  LVPWd: 0.67 cm  FS: 6.9 %  EDV(Teich): 274.3 ml  ESV(Teich): 233.4 ml  LV mass(C)d: 263.3 grams  Ao root diam: 3.3 cm  asc Aorta Diam: 3.1 cm  LVOT diam: 2.1 cm  LVOT area: 3.6 cm2  LA Volume (BP): 82.0 ml  LA Volume Index (BP): 44.6 ml/m2  MV E max jesús: 43.9 cm/sec  MV A max jesús: 19.7 cm/sec  MV E/A: 2.2  MV dec time: 0.29 sec  PA acc time: 0.09 sec  TR max jesús: 220.4 cm/sec  TR max P.4 mmHg  Lateral E/e': 7.9  Medial E/e': 10.9    CORONARY ANGIOGRAPHY: 2017  No significant coronary artery disease.  Non-ischemic cardiomyopathy.    MR CARDIAC W CONTRAST: 2017      Cardiac MRI with and without contrast was performed on a 1.5 T scanner to evaluate myocardial morphology, function and viability, and to  assess for etiology of cardiomyopathy in a 51-year-old male with newly diagnosed cardiomyopathy.     1. The left ventricle is severely dilated in cavity size. There is global thinning. The global systolic function is severely reduced. The  quantitative LV ejection fraction is 9%. There is severe global hypokinesis with akinesis of the septal segments.     LV volumes absolute and indexed to BSA with age and gender-matched normal values in parentheses are listed below:     EDV: 426 ml (159, 113-196 ml)  ESV: 388 ml (54, 31-76  ml)  EDVI: 239 ml/m2 (81, 64-99 ml/m2)  ESVI: 218 ml/m2 (27, 17-38 ml/m2)     2. The right ventricle is moderately dilated in cavity size. The global systolic function is severely reduced. The quantitative RV  ejection fraction is 18%.     RV volumes absolute and indexed to BSA with age and gender-matched normal values in parentheses are listed below:     EDV: 268 ml (166, 116-216 ml)  ESV: 221 ml (59, 29-89 ml)  EDVI: 151 ml/m2 (85,  ml/m2)  ESVI: 124 ml/m2 (30, 16-45 ml/m2)     3. The left atrium is severely dilated. The right atrium is mildly dilated.     4. The aortic valve is trileaflet in morphology. There is no significant aortic valve stenosis or regurgitation.      5. There is mild mitral regurgitation. There is mild tricuspid regurgitation.     6. Delayed enhancement imaging demonstrates extensive areas of delayed enhancement in multiple non-ischemic patterns: transmural,  mid-myocardial, subendocardial and epicardial patterns are seen. This is consistent with a non-ischemic cardiomyopathy of inflammatory  origin such as myocarditis. The extensive involvement with wall thinning and severe left ventricular dysfunction suggests this is more  of a subacute or chronic process.     7. There is no intracardiac thrombus.     8. There is no pericardial effusion.       IMPRESSION:     Severe non-ischemic cardiomyopathy with biventricular involvement. LVEF of 9% and RVEF of 18%. Extensive fibrosis that is suggestive of  an inflammatory etiology to the cardiomyopathy such as a fulminant myocarditis. LV thinning is suggestive of a subacute or chronic  timeline for the cardiomyopathy (i.e., the myocarditis does not appear to be acute).     Measurements:     LV wall thickness - Anteroseptal: 0.8 cm  LV wall thickness - Inferolateral: 0.6 cm  LV end-diastolic diameter: 8.0 cm  LV end-systolic diameter: 7.8 cm  LA katiuska-posterior diameter: 4.8 cm  Aortic root at the sinuses of Valsalva: 3.4  cm     Contrast:     Contrast agent: MultiHance  Volume administered: 7.5 ml     Pulse sequences used:     SSFP Localizers  SSFP Cine  IR SSFP Single Shot  IR GRE Segmented    MR CARDIAC W CONTRAST: 4/17/2017    Indication: 52 year-old male with cardiomyopathy diagnosed 1/2017 with prior CMR 1/11/17 showing severe biventricular dysfunction  (LVEF=9% RVEF=18%) and extensive fibrosis. CMR to reassess function and fibrosis.   Comparison: Prior cardiac MRI 1/11/2017   Technical quality: adequate. There is significant susceptibility artifact.       IMPRESSION:  1. Severely dilated left ventricle with severely reduced global function with a calculated ejection fraction of 12 %.  2. Moderately dilated right ventricle with moderately reduced global function with a calculated ejection fraction of 30%.   3. There is mild mitral and tricuspid regurgitation.  4. The left atrium is severely dilated. The right atrium is mildly dilated.  5. On delayed enhancement imaging, there is extensive hyperenhancement in multiple non-ischemic patterns: transmural,  mid-myocardial, subendocardial, and epicardial. 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 The extent and  distribution of hyperenhancement are unchanged from the prior study.  6. Compared with the prior study dated 1/11/2017, right ventricular function has improved.      The MRI sequences and imaging planes in this study were tailored for cardiac imaging and are suboptimal for evaluation of non-cardiac  structures.     FINDINGS     Left ventricle  Cavity size: Severely dilated  Wall thickness: Thinned  Global systolic function: Severely depressed with a quantitative LV ejection fraction of 12%.   Regional wall motion: Normal  Delayed enhancement: There is extensive hyperenhancement in multiple non-ischemic patterns: transmural, mid-myocardial, subendocardial, and  epicardial. 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 The extent and  distribution of hyperenhancement are  unchanged from the prior study.     Right ventricle  Cavity size: Severely dilated  Wall thickness: Normal  Global systolic function: Moderately depressed with a quantitative RV ejection fraction of 30%.   Regional wall motion: Normal     Atria  LA size: Severely dilated  RA size: Mildly dilated     Valves  Aortic Valve  Valve morphology: Tricuspid  Aortic stenosis: None  Aortic regurgitation: None     Mitral Valve  Mitral stenosis: None  Mitral regurgitation: Mild     Tricuspid Valve   Tricuspid stenosis: None  Tricuspid regurgitation: Mild     Pulmonic Valve  Pulmonic stenosis: None  Pulmonic regurgitation: None     Extracardiac  Pericardial thickness: Normal  Pericardial effusion:None  Pleural effusion: None     Measurements  LV volumes absolute and indexed to BSA with age and gender-matched normal values in parentheses (mean, 95% CI) are listed below:     EDV: 390 ml (154, 113-196 mL)  EDVI: 219 ml/m2 (79, 62-97 mL/m2)  ESV: 345 ml (51,29-74 mL)  ESVI: 194 ml/m2 (26, 15-37 mL/m2)     RV volumes absolute and indexed to BSA with age and gender-matched normal values in parentheses (mean, 95% CI) are listed below:     EDV: 223 ml (160, 111-210 mL)  EDVI: 125 ml/m2 (82,  mL/m2)  ESV: 156 ml (55,25-85 mL)  ESVI: 88 ml/m2 (28, 13-42 mL/m2)     LV wall thickness - Anteroseptal: 0.7 cm  LV wall thickness - Inferolateral: 0.7 cm  LV end-diastolic diameter: 8.2 cm  LV end-systolic diameter: 7.2 cm  LA katiuska-posterior diameter:4.2 cm     Sedation and contrast  Sedation used: No  Contrast agent: Gadavist  Volume administered: 13.5 mL     Pulse sequences used  SSFP Localizers  SSFP Cine  IR SSFP Single Shot  IR GRE Segmented  Contrast-enhanced early and late enhancement imaging  Image post-processing was performed on a [employer] workstation    ASSESSMENT AND PLAN:  1. Congestive heart failure, stage C, NYHA class I, LVEF 12%, RVEF 30%, non-ischemic etiology. I have reviewed all imaging  and available test results.  2. Euvolemic by clinical assessment.  3. Recommend:  -- Discontinue Toprol XL.  -- Start carvedilol 6.25 mg PO BID for 2 weeks; increase to 6.25 mg PO QAM and 9.375 mg PO QPM for 2 weeks; increase to 9.375 mg PO BID for 2 weeks; increase to 9.375 mg PO QAM and 12.5 mg PO QPM for 2 weeks; increase to 12.5 mg PO BID.  -- Lisinopril 5 mg PO BID.  -- Spironolactone 12.5 mg PO QD.  -- BMP and NT proBNP in 2 weeks.  -- Proceed with AICD placement as previously scheduled.  -- RTC in 6 months. Will uptitrate lisinopril at hat time, and then finally carvedilol once more up to the target dose of 25 mg PO BID if tolerated.    Thank you for your referral. Please do no not hesitate to call me directly should you have any questions regarding this evaluation.    Channing Cardona MD, PhD

## 2017-05-12 NOTE — MR AVS SNAPSHOT
After Visit Summary   5/12/2017    Akshat Fragoso    MRN: [id number]           Patient Information     Date Of Birth          1965        Visit Information        Provider Department      5/12/2017 10:30 AM Channing Cardona MD [facility] PHYSICI[facility] HEART AT [facility]        Today's Diagnoses     Chronic congestive heart failure, unspecified congestive heart failure type (H)          Care Instructions    1.  Dr. Cardona has requested the following medication changes:    -increase your lisinopril 5mg to TWICE a day  -Start Spironolactone 12.5mg per day (1/2 tablet)  -Stopping Metoprolol   -Starting Coreg 6.25mg    2. With the Coreg, you are to follow this titration schedule:   -take 6.25mg TWICE per day for 2 weeks (today until May 26th)   -take 6.25mg in the AM and 9.375mg in the PM for 2 weeks (May 27th-June 10th)  -take 9.375mg TWICE per day for 2 weeks (June 11th-June 25th)  -take 9.375mg in AM and 12.5mg in the PM for 2 weeks (June 26th-July 10th)  -take 12.5mg TWICE a day for 2 weeks (July 11th-July 25th)    3. Your goal Blood Pressure should be 100/65 and pulse of 60-65.     4. You are scheduled for a lab draw at the [facility] on Thursday, May 25th at 10:15am.    5. Once you are completed with the titration, Dr. Cardona would like to see you again in clinic. This is scheduled for Friday, August 4th at 4:30pm at the [facility].    6. Your appointment with Dr. Cardona on June 16th has been cancelled.    [facility] - [place] [facility]    If you have any questions regarding to your visit please contact your care team:     Cardiology  Telephone Number   Henrique Owens  Cardiology RN's.    Halie August CMA [phone]    After hours: [phone].  [phone]   For scheduling appts:     [phone] or  [phone]    After hours: [phone]   For the Device Clinic (Pacemakers and ICD's)  RN's :  Alexandra Vu    During business hours: [phone]  After business hours:  [phone]- select option 4.      If you need a medication refill please contact your pharmacy.  Please allow 3 business days for your refill to be completed.    As always, Thank you for trusting us with your health care needs!  _____________________________________________________________________      Patient Education    Carvedilol Oral capsule, extended-release    Carvedilol Oral tablet  Carvedilol Oral tablet  What is this medicine?  CARVEDILOL (RUPALI ve dil ol) is a beta-blocker. Beta-blockers reduce the workload on the heart and help it to beat more regularly. This medicine is used to treat high blood pressure and heart failure.  This medicine may be used for other purposes; ask your health care provider or pharmacist if you have questions.  What should I tell my health care provider before I take this medicine?  They need to know if you have any of these conditions:    circulation problems    diabetes    history of heart attack or heart disease    liver disease    lung or breathing disease, like asthma or emphysema    pheochromocytoma    slow or irregular heartbeat    thyroid disease    an unusual or allergic reaction to carvedilol, other beta-blockers, medicines, foods, dyes, or preservatives    pregnant or trying to get pregnant    breast-feeding  How should I use this medicine?  Take this medicine by mouth with a glass of water. Follow the directions on the prescription label. It is best to take the tablets with food. Take your doses at regular intervals. Do not take your medicine more often than directed. Do not stop taking except on the advice of your doctor or health care professional.  Talk to your pediatrician regarding the use of this medicine in children. Special care may be needed.  Overdosage: If you think you have taken too much of this medicine contact a poison control center or emergency room at once.  NOTE: This medicine is only for  you. Do not share this medicine with others.  What if I miss a dose?  If you miss a dose, take it as soon as you can. If it is almost time for your next dose, take only that dose. Do not take double or extra doses.  What may interact with this medicine?  This medicine may interact with the following medications:    certain medicines for blood pressure, heart disease, irregular heart beat    certain medicines for depression, like fluoxetine or paroxetine    certain medicines for diabetes, like glipizide or glyburide    cimetidine    clonidine    cyclosporine    digoxin    MAOIs like Carbex, Eldepryl, Marplan, Nardil, and Parnate    reserpine    rifampin  This list may not describe all possible interactions. Give your health care provider a list of all the medicines, herbs, non-prescription drugs, or dietary supplements you use. Also tell them if you smoke, drink alcohol, or use illegal drugs. Some items may interact with your medicine.  What should I watch for while using this medicine?  Check your heart rate and blood pressure regularly while you are taking this medicine. Ask your doctor or health care professional what your heart rate and blood pressure should be, and when you should contact him or her. Do not stop taking this medicine suddenly. This could lead to serious heart-related effects.  Contact your doctor or health care professional if you have difficulty breathing while taking this drug.  Check your weight daily. Ask your doctor or health care professional when you should notify him/her of any weight gain.  You may get drowsy or dizzy. Do not drive, use machinery, or do anything that requires mental alertness until you know how this medicine affects you. To reduce the risk of dizzy or fainting spells, do not sit or stand up quickly. Alcohol can make you more drowsy, and increase flushing and rapid heartbeats. Avoid alcoholic drinks.  If you have diabetes, check your blood sugar as directed. Tell your  doctor if you have changes in your blood sugar while you are taking this medicine.  If you are going to have surgery, tell your doctor or health care professional that you are taking this medicine.  What side effects may I notice from receiving this medicine?  Side effects that you should report to your doctor or health care professional as soon as possible:    allergic reactions like skin rash, itching or hives, swelling of the face, lips, or tongue    breathing problems    dark urine    irregular heartbeat    swollen legs or ankles    vomiting    yellowing of the eyes or skin  Side effects that usually do not require medical attention (report to your doctor or health care professional if they continue or are bothersome):    change in sex drive or performance    diarrhea    dry eyes (especially if wearing contact lenses)    dry, itching skin    headache    nausea    unusually tired  This list may not describe all possible side effects. Call your doctor for medical advice about side effects. You may report side effects to FDA at 0-836-FDA-5261.  Where should I keep my medicine?  Keep out of the reach of children.  Store at room temperature below 30 degrees C (86 degrees F). Protect from moisture. Keep container tightly closed. Throw away any unused medicine after the expiration date.  NOTE:This sheet is a summary. It may not cover all possible information. If you have questions about this medicine, talk to your doctor, pharmacist, or health care provider. Copyright  2016 Gold Standard        Spironolactone Oral tablet  What is this medicine?  SPIRONOLACTONE (celio on oh LAK tone) is a diuretic. It helps you make more urine and to lose excess water from your body. This medicine is used to treat high blood pressure, and edema or swelling from heart, kidney, or liver disease. It is also used to treat patients who make too much aldosterone or have low potassium.  This medicine may be used for other purposes; ask your  health care provider or pharmacist if you have questions.  What should I tell my health care provider before I take this medicine?  They need to know if you have any of these conditions:    high blood level of potassium    kidney disease or trouble making urine    liver disease    an unusual or allergic reaction to spironolactone, other medicines, foods, dyes, or preservatives    pregnant or trying to get pregnant    breast-feeding  How should I use this medicine?  Take this medicine by mouth with a drink of water. Follow the directions on your prescription label. You can take it with or without food. If it upsets your stomach, take it with food. Do not take your medicine more often than directed. Remember that you will need to pass more urine after taking this medicine. Do not take your doses at a time of day that will cause you problems. Do not take at bedtime.  Talk to your pediatrician regarding the use of this medicine in children. While this drug may be prescribed for selected conditions, precautions do apply.  Overdosage: If you think you have taken too much of this medicine contact a poison control center or emergency room at once.  NOTE: This medicine is only for you. Do not share this medicine with others.  What if I miss a dose?  If you miss a dose, take it as soon as you can. If it is almost time for your next dose, take only that dose. Do not take double or extra doses.  What may interact with this medicine?  Do not take this medicine with any of the following medications:    eplerenone  This medicine may also interact with the following medications:    corticosteroids    digoxin    lithium    medicines for high blood pressure like ACE inhibitors    skeletal muscle relaxants like tubocurarine    NSAIDs, medicines for pain and inflammation, like ibuprofen or naproxen    potassium products like salt substitute or supplements    pressor amines like norepinephrine    some diuretics  This list may not  describe all possible interactions. Give your health care provider a list of all the medicines, herbs, non-prescription drugs, or dietary supplements you use. Also tell them if you smoke, drink alcohol, or use illegal drugs. Some items may interact with your medicine.  What should I watch for while using this medicine?  Visit your doctor or health care professional for regular checks on your progress. Check your blood pressure as directed. Ask your doctor what your blood pressure should be, and when you should contact them.  You may need to be on a special diet while taking this medicine. Ask your doctor. Also, ask how many glasses of fluid you need to drink a day. You must not get dehydrated.  This medicine may make you feel confused, dizzy or lightheaded. Drinking alcohol and taking some medicines can make this worse. Do not drive, use machinery, or do anything that needs mental alertness until you know how this medicine affects you. Do not sit or stand up quickly.  What side effects may I notice from receiving this medicine?  Side effects that you should report to your doctor or health care professional as soon as possible:    allergic reactions such as skin rash or itching, hives, swelling of the lips, mouth, tongue, or throat    black or tarry stools    fast, irregular heartbeat    fever    muscle pain, cramps    numbness, tingling in hands or feet    trouble breathing    trouble passing urine    unusual bleeding    unusually weak or tired  Side effects that usually do not require medical attention (report to your doctor or health care professional if they continue or are bothersome):    change in voice or hair growth    confusion    dizzy, drowsy    dry mouth, increased thirst    enlarged or tender breasts    headache    irregular menstrual periods    sexual difficulty, unable to have an erection    stomach upset  This list may not describe all possible side effects. Call your doctor for medical advice about  side effects. You may report side effects to FDA at 7-503-IPI-6613.  Where should I keep my medicine?  Keep out of the reach of children.  Store below 25 degrees C (77 degrees F). Throw away any unused medicine after the expiration date.  NOTE:This sheet is a summary. It may not cover all possible information. If you have questions about this medicine, talk to your doctor, pharmacist, or health care provider. Copyright  2016 Gold Standard              Follow-ups after your visit        Your next 10 appointments already scheduled     May 25, 2017 10:15 AM CDT   LAB with [facility] LAB   [facility]    [address] [place] [id number] [phone]           Patient must bring picture ID.  Patient should be prepared to give a urine specimen  Please do not eat 10-12 hours before your appointment if you are coming in fasting for labs on lipids, cholesterol, or glucose (sugar).  Pregnant women should follow their Care Team instructions. Water with medications is okay. Do not drink coffee or other fluids.   If you have concerns about taking  your medications, please ask at office or if scheduling via [email or web address], send a message by clicking on Secure Messaging, Message Your Care Team.            Jun 08, 2017  7:00 AM CDT   Procedure 1.5 hr with U2A ROOM 9   [address] [facility] ([facility])    [address] [address]               Jun 08, 2017  8:30 AM CDT   Ep 90 Minute with UUHCVR1   [facility] [place]  Heart [facility] ([facility])    [address] [address]   [phone]            Miguel A 15, 2017  5:30 PM CDT   (Arrive by 5:15 PM)   Implanted Defibulator with Uc Cv Device [address] [place] [address] ([facility])    [address]  3rd Floor  [facility] [address] [phone]            Aug 04, 2017  4:30 PM CDT   (Arrive by 4:15  PM)   Return Visit with Channing Cardona MD   [facility] ([facility])    [address] [address]   [phone]            Sep 18, 2017 10:00 AM CDT   (Arrive by 9:45 AM)   Implanted Defibulator with Uc Cv Device 1   [facility] ([facility])    [address] [address]   [phone]            Sep 18, 2017 10:40 AM CDT   (Arrive by 10:25 AM)   RETURN ARRHYTHMIA with Brynn Moy MD   [facility] ([facility])    [address] [address]   [phone]              Future tests that were ordered for you today     Open Future Orders        Priority Expected Expires Ordered    Basic metabolic panel Routine  5/12/2018 5/12/2017    N terminal pro BNP outpatient Routine  5/12/2018 5/12/2017            Who to contact     If you have questions or need follow up information about today's clinic visit or your schedule please contact [facility] AT [facility] directly at [phone].  Normal or non-critical lab and imaging results will be communicated to you by MyChart, letter or phone within 4 business days after the clinic has received the results. If you do not hear from us within 7 days, please contact the clinic through [employer] or phone. If you have a critical or abnormal lab result, we will notify you by phone as soon as possible.  Submit refill requests through [employer] or call your pharmacy and they will forward the refill request to us. Please allow 3 business days for your refill to be completed.          Additional Information About Your Visit        [employer] Information     [employer] gives you secure access to your electronic health record. If you see a primary care provider, you can also send messages to your care team and make appointments. If you have questions, please call  your primary care clinic.  If you do not have a primary care provider, please call [phone] and they will assist you.        Care EveryWhere ID     This is your Care EveryWhere ID. This could be used by other organizations to access your [place] medical records  [id number]        Your Vitals Were     Pulse Pulse Oximetry                85 95%           Blood Pressure from Last 3 Encounters:   05/12/17 124/70   04/24/17 93/57   02/27/17 106/66    Weight from Last 3 Encounters:   01/25/17 68.5 kg (151 lb)   01/23/17 68.5 kg (151 lb)   01/18/17 68 kg (150 lb)                 Today's Medication Changes          These changes are accurate as of: 5/12/17 11:49 AM.  If you have any questions, ask your nurse or doctor.               Start taking these medicines.        Dose/Directions    carvedilol 6.25 MG tablet   Commonly known as:  COREG   Used for:  Chronic congestive heart failure, unspecified congestive heart failure type (H)   Started by:  Channing Cardona MD        Dose:  6.25 mg   Take 1 tablet (6.25 mg) by mouth 2 times daily (with meals)   Quantity:  90 tablet   Refills:  3       spironolactone 25 MG tablet   Commonly known as:  ALDACTONE   Used for:  Chronic congestive heart failure, unspecified congestive heart failure type (H)   Started by:  Channing Cardona MD        Dose:  12.5 mg   Take 0.5 tablets (12.5 mg) by mouth daily   Quantity:  45 tablet   Refills:  3         These medicines have changed or have updated prescriptions.        Dose/Directions    lisinopril 5 MG tablet   Commonly known as:  PRINIVIL/ZESTRIL   This may have changed:    - medication strength  - how much to take  - when to take this   Used for:  Chronic congestive heart failure, unspecified congestive heart failure type (H)   Changed by:  Channing Cardona MD        Dose:  5 mg   Take 1 tablet (5 mg) by mouth 2 times daily   Quantity:  180 tablet   Refills:  3         Stop taking these medicines if you haven't  already. Please contact your care team if you have questions.     metoprolol 50 MG 24 hr tablet   Commonly known as:  TOPROL-XL   Stopped by:  Channing Cardona MD                Where to get your medicines      These medications were sent to [facility] - [place], [place] - [address]  [address] [place] [address]     Phone:  [phone]     carvedilol 6.25 MG tablet    lisinopril 5 MG tablet    spironolactone 25 MG tablet                Primary Care Provider Office Phone # Fax #    VERONICA Hallman [phone]       [facility] [address] [place] [address]        Thank you!     Thank you for choosing [facility] PHYSICIANS HEART AT [facility]  for your care. Our goal is always to provide you with excellent care. Hearing back from our patients is one way we can continue to improve our services. Please take a few minutes to complete the written survey that you may receive in the mail after your visit with us. Thank you!             Your Updated Medication List - Protect others around you: Learn how to safely use, store and throw away your medicines at www.disposemymeds.org.          This list is accurate as of: 5/12/17 11:49 AM.  Always use your most recent med list.                   Brand Name Dispense Instructions for use    aspirin 81 MG tablet      Take 81 mg by mouth daily       carvedilol 6.25 MG tablet    COREG    90 tablet    Take 1 tablet (6.25 mg) by mouth 2 times daily (with meals)       furosemide 20 MG tablet    LASIX    60 tablet    Take 1 tablet (20 mg) by mouth daily as needed Only take if weight up by 3lbs       lisinopril 5 MG tablet    PRINIVIL/ZESTRIL    180 tablet    Take 1 tablet (5 mg) by mouth 2 times daily       * oxyCODONE-acetaminophen 5-325 MG per tablet    PERCOCET    56 tablet    Take 1 tablet by mouth every 6 hours as needed for pain . Use sparingly only for severe pain.       *  oxyCODONE-acetaminophen 5-325 MG per tablet    PERCOCET    44 tablet    Take one tablet twice a day as needed for pain for 2 weeks and then 1 tablet per day for 4 weeks and then stop.       * oxyCODONE-acetaminophen 5-325 MG per tablet    PERCOCET    10 tablet    Take 1 tablet by mouth 2 times daily as needed for pain       * oxyCODONE-acetaminophen 5-325 MG per tablet    PERCOCET    28 tablet    Take 1 tablet by mouth 2 times daily as needed for pain maximum 2 tablet(s) per day       spironolactone 25 MG tablet    ALDACTONE    45 tablet    Take 0.5 tablets (12.5 mg) by mouth daily       * Notice:  This list has 4 medication(s) that are the same as other medications prescribed for you. Read the directions carefully, and ask your doctor or other care provider to review them with you.

## 2017-05-22 ENCOUNTER — OFFICE VISIT (OUTPATIENT)
Dept: FAMILY MEDICINE | Facility: CLINIC | Age: 52
End: 2017-05-22
Payer: COMMERCIAL

## 2017-05-22 VITALS
WEIGHT: 157 LBS | HEIGHT: 67 IN | BODY MASS INDEX: 24.64 KG/M2 | HEART RATE: 83 BPM | TEMPERATURE: 97.9 F | SYSTOLIC BLOOD PRESSURE: 122 MMHG | OXYGEN SATURATION: 98 % | DIASTOLIC BLOOD PRESSURE: 64 MMHG

## 2017-05-22 DIAGNOSIS — R07.81 RIB PAIN: Primary | ICD-10-CM

## 2017-05-22 PROCEDURE — 99214 OFFICE O/P EST MOD 30 MIN: CPT | Performed by: NURSE PRACTITIONER

## 2017-05-22 RX ORDER — OXYCODONE AND ACETAMINOPHEN 5; 325 MG/1; MG/1
1 TABLET ORAL EVERY 8 HOURS PRN
Qty: 50 TABLET | Refills: 0 | Status: SHIPPED | OUTPATIENT
Start: 2017-06-09 | End: 2017-08-21

## 2017-05-22 RX ORDER — OXYCODONE AND ACETAMINOPHEN 5; 325 MG/1; MG/1
1 TABLET ORAL EVERY 8 HOURS PRN
Qty: 50 TABLET | Refills: 0 | Status: SHIPPED | OUTPATIENT
Start: 2017-05-22 | End: 2017-08-21

## 2017-05-22 NOTE — PROGRESS NOTES
"  SUBJECTIVE:                                                    Amita Fragoso is a 52 year old male who presents to clinic today for the following health issues:  Chief Complaint   Patient presents with     Rib Pain       Musculoskeletal problem/pain    Duration: 3-4 months     Description  Amita has a history of a sudden onset CHF. He has been under the care of the [facility] cardiology group.  He is taking his medication and following up with his appointments as scheduled.  He has been off work since the beginning of February and he is not to go back to work for how.  He is on activity restrictions. \"I can walk about half a block for now.\"  No energy/activity tolerance.  Tylenol: 2 tablets per day as needed.    He is wearing the [employer] Life Vest/defibrillator for 3 months. He has been defibrillated twice in the 3 months.     Today, amita is in the clinic requesting percocet refills for his LifeVest related rib pain. He has used 2-3 percocets per day.     Location: ribs/chest.    June 8th--debrillator will be placed.      Intensity:  Severe 10/10    Accompanying signs and symptoms: coughing up phlegm     History  Previous similar problem: YES  Previous evaluation:      Precipitating or alleviating factors:  Trauma or overuse: YES- wears a vest that causes the pain   Aggravating factors include: at its worst when laying down     Therapies tried and outcome: alternating tylenol and ibuprofen        Problem list and histories reviewed & adjusted, as indicated.  Additional history: as documented    Patient Active Problem List   Diagnosis     CARDIOVASCULAR SCREENING; LDL GOAL LESS THAN 160     Acute right lumbar radiculopathy     Personal history of smoking     CHF (congestive heart failure) (H)     Acute systolic heart failure (H)     Cardiomyopathy, unspecified (H)     Cardiomyopathy, nonischemic (H)     Pulmonary nodules, next CT due 1/2018     Personal history of tobacco use, presenting hazards to health "     Past Surgical History:   Procedure Laterality Date     None         Social History   Substance Use Topics     Smoking status: Former Smoker     Packs/day: 0.50     Years: 15.00     Types: Cigarettes     Quit date: 12/1/2016     Smokeless tobacco: Former User     Quit date: 10/24/2011     Alcohol use No     History reviewed. No pertinent family history.      Current Outpatient Prescriptions   Medication Sig Dispense Refill     aspirin 81 MG tablet Take 81 mg by mouth daily       lisinopril (PRINIVIL/ZESTRIL) 5 MG tablet Take 1 tablet (5 mg) by mouth 2 times daily 180 tablet 3     spironolactone (ALDACTONE) 25 MG tablet Take 0.5 tablets (12.5 mg) by mouth daily 45 tablet 3     carvedilol (COREG) 6.25 MG tablet Take 1 tablet (6.25 mg) by mouth 2 times daily (with meals) 90 tablet 3     Blood Pressure Monitor KIT 1 kit daily 1 kit 0     furosemide (LASIX) 20 MG tablet Take 1 tablet (20 mg) by mouth daily as needed Only take if weight up by 3lbs 60 tablet 1     oxyCODONE-acetaminophen (PERCOCET) 5-325 MG per tablet Take 1 tablet by mouth 2 times daily as needed for pain maximum 2 tablet(s) per day (Patient not taking: Reported on 5/22/2017) 28 tablet 0     oxyCODONE-acetaminophen (PERCOCET) 5-325 MG per tablet Take 1 tablet by mouth 2 times daily as needed for pain (Patient not taking: Reported on 5/22/2017) 10 tablet 0     oxyCODONE-acetaminophen (PERCOCET) 5-325 MG per tablet Take one tablet twice a day as needed for pain for 2 weeks and then 1 tablet per day for 4 weeks and then stop. (Patient not taking: Reported on 5/22/2017) 44 tablet 0     oxyCODONE-acetaminophen (PERCOCET) 5-325 MG per tablet Take 1 tablet by mouth every 6 hours as needed for pain . Use sparingly only for severe pain. (Patient not taking: Reported on 5/22/2017) 56 tablet 0     Allergies   Allergen Reactions     No Known Allergies      Recent Labs   Lab Test  01/12/17   0655  01/11/17   1735  01/10/17   0718   01/09/17   1001   LDL   --    --   " 64   --    --    HDL   --    --   39*   --    --    TRIG   --    --   103   --    --    ALT   --    --    --    --   111*   CR  0.85  0.94  0.90   < >  0.85   GFRESTIMATED  >90  Non  GFR Calc    85  90   < >  >90  Non  GFR Calc     GFRESTBLACK  >90   GFR Calc    >90   GFR Calc    >90   GFR Calc     < >  >90   GFR Calc     POTASSIUM  4.2  4.2  4.4   --   4.9   TSH   --    --    --    --   1.34    < > = values in this interval not displayed.      BP Readings from Last 3 Encounters:   05/22/17 145/73   05/12/17 124/70   04/24/17 93/57    Wt Readings from Last 3 Encounters:   05/22/17 157 lb (71.2 kg)   01/25/17 151 lb (68.5 kg)   01/23/17 151 lb (68.5 kg)              Labs reviewed in EPIC    Reviewed and updated as needed this visit by clinical staff       Reviewed and updated as needed this visit by Provider         ROS:  Constitutional, HEENT, cardiovascular, pulmonary, gi and gu systems are negative, except as otherwise noted.    OBJECTIVE:                                                    /73  Pulse 83  Temp 97.9  F (36.6  C) (Oral)  Ht 5' 7\" (1.702 m)  Wt 157 lb (71.2 kg)  SpO2 98%  BMI 24.59 kg/m2  Body mass index is 24.59 kg/(m^2).  GENERAL APPEARANCE: healthy, alert and no distress. Smiling.   SKIN: warm and dry  PSYCH: mentation appears normal and affect normal/bright.  Good eye contact.     ASSESSMENT/PLAN:                                                    (R07.81) Rib pain  (primary encounter diagnosis)  Comment:   Plan: oxyCODONE-acetaminophen (PERCOCET) 5-325 MG per        tablet, oxyCODONE-acetaminophen (PERCOCET)         5-325 MG per tablet          I discussed and reviewed with Akshat's long-term Percocet use.  This is being an ongoing since the first of the year, when all this troubles began.  With wearing the rib belt, it is my understanding that this rib pain will continue.  With Akshat today " we establish a plan that I would prescribe 50 Percocet tablets to be used 2-3 tablets per day between now and his surgical procedure. After the surgical procedure at I anticipate with the rib belt being removed and he had a defibrillator in place, but his need for Percocet will be reduced. I did ground one additional refill to cover him for surgical pain. He is aware that he is using medication sparingly.  He is to titrate down on the Percocet use as soon as possible. He verbalizes understanding and states that that is his wish as well.    Total: 30 min spent with the patient, >50% time spent face to face counseling regarding his use of Percocet, establishing a plan of care, writing prescriptions, and coordinating follow-up.    VERONICA Doran [facility]

## 2017-05-22 NOTE — MR AVS SNAPSHOT
After Visit Summary   5/22/2017    Akshat Fragoso    MRN: [id number]           Patient Information     Date Of Birth          1965        Visit Information        Provider Department      5/22/2017 9:20 AM Denise White APRN CNP [facility]        Today's Diagnoses     Rib pain    -  1       Follow-ups after your visit        Your next 10 appointments already scheduled     Jun 08, 2017  7:00 AM CDT   Procedure 1.5 hr with U2A ROOM 9   [address] [facility] ([facility])    [address] [id number]               Jun 08, 2017  8:30 AM CDT   Ep 90 Minute with UUHCVR1   [facility],  Heart Cath Lab ([facility])    [address] [address]   [phone]            Miguel A 15, 2017  5:30 PM CDT   (Arrive by 5:15 PM)   Implanted Defibulator with Uc Cv Device 1   [facility] ([facility])    [address] [place] [id number] [phone]            Aug 04, 2017  4:30 PM CDT   (Arrive by 4:15 PM)   Return Visit with Channing Cardona MD   [facility] ([facility])    [address] [place] [id number] [phone]            Sep 18, 2017 10:00 AM CDT   (Arrive by 9:45 AM)   Implanted Defibulator with Uc Cv Device 1   [facility] ([facility])    [address] [place] [id number] [phone]            Sep 18, 2017 10:40 AM CDT   (Arrive by 10:25 AM)   RETURN ARRHYTHMIA with Brynn Moy MD   [facility] ([facility])    [address] [place] [id number] [phone]              Who to contact     If you have questions or need follow up information about today's clinic visit or your schedule please contact [facility]  "[place] directly at [phone].  Normal or non-critical lab and imaging results will be communicated to you by MyChart, letter or phone within 4 business days after the clinic has received the results. If you do not hear from us within 7 days, please contact the clinic through expressor softwarehart or phone. If you have a critical or abnormal lab result, we will notify you by phone as soon as possible.  Submit refill requests through [email or web address] or call your pharmacy and they will forward the refill request to us. Please allow 3 business days for your refill to be completed.          Additional Information About Your Visit        expressor softwareharBacula Information     [email or web address] gives you secure access to your electronic health record. If you see a primary care provider, you can also send messages to your care team and make appointments. If you have questions, please call your primary care clinic.  If you do not have a primary care provider, please call [phone] and they will assist you.        Care EveryWhere ID     This is your Care EveryWhere ID. This could be used by other organizations to access your [place] medical records  [id number]        Your Vitals Were     Pulse Temperature Height Pulse Oximetry BMI (Body Mass Index)       83 97.9  F (36.6  C) (Oral) 5' 7\" (1.702 m) 98% 24.59 kg/m2        Blood Pressure from Last 3 Encounters:   05/22/17 122/64   05/12/17 124/70   04/24/17 93/57    Weight from Last 3 Encounters:   05/22/17 157 lb (71.2 kg)   01/25/17 151 lb (68.5 kg)   01/23/17 151 lb (68.5 kg)              Today, you had the following     No orders found for display         Today's Medication Changes          These changes are accurate as of: 5/22/17 11:59 PM.  If you have any questions, ask your nurse or doctor.               Start taking these medicines.        Dose/Directions    * oxyCODONE-acetaminophen 5-325 MG per tablet   Commonly known as:  PERCOCET   Used for:  Rib pain   Started by:  Denise White APRN " CNP        Dose:  1 tablet   Take 1 tablet by mouth every 8 hours as needed for pain   Quantity:  50 tablet   Refills:  0       * oxyCODONE-acetaminophen 5-325 MG per tablet   Commonly known as:  PERCOCET   Used for:  Rib pain   Started by:  Denise White APRN CNP        Dose:  1 tablet   Start taking on:  6/9/2017   Take 1 tablet by mouth every 8 hours as needed for pain   Quantity:  50 tablet   Refills:  0       * Notice:  This list has 2 medication(s) that are the same as other medications prescribed for you. Read the directions carefully, and ask your doctor or other care provider to review them with you.         Where to get your medicines      Some of these will need a paper prescription and others can be bought over the counter.  Ask your nurse if you have questions.     Bring a paper prescription for each of these medications     oxyCODONE-acetaminophen 5-325 MG per tablet    oxyCODONE-acetaminophen 5-325 MG per tablet                Primary Care Provider Office Phone # Fax #    VERONICA Hallman [phone]       [facility] [address] [place] [address]        Thank you!     Thank you for choosing [facility]  for your care. Our goal is always to provide you with excellent care. Hearing back from our patients is one way we can continue to improve our services. Please take a few minutes to complete the written survey that you may receive in the mail after your visit with us. Thank you!             Your Updated Medication List - Protect others around you: Learn how to safely use, store and throw away your medicines at www.disposemymeds.org.          This list is accurate as of: 5/22/17 11:59 PM.  Always use your most recent med list.                   Brand Name Dispense Instructions for use    aspirin 81 MG tablet      Take 81 mg by mouth daily       Blood Pressure Monitor Kit     1 kit    1 kit daily       carvedilol 6.25  MG tablet    COREG    90 tablet    Take 1 tablet (6.25 mg) by mouth 2 times daily (with meals)       furosemide 20 MG tablet    LASIX    60 tablet    Take 1 tablet (20 mg) by mouth daily as needed Only take if weight up by 3lbs       lisinopril 5 MG tablet    PRINIVIL/ZESTRIL    180 tablet    Take 1 tablet (5 mg) by mouth 2 times daily       * oxyCODONE-acetaminophen 5-325 MG per tablet    PERCOCET    50 tablet    Take 1 tablet by mouth every 8 hours as needed for pain       * oxyCODONE-acetaminophen 5-325 MG per tablet   Start taking on:  6/9/2017    PERCOCET    50 tablet    Take 1 tablet by mouth every 8 hours as needed for pain       spironolactone 25 MG tablet    ALDACTONE    45 tablet    Take 0.5 tablets (12.5 mg) by mouth daily       * Notice:  This list has 2 medication(s) that are the same as other medications prescribed for you. Read the directions carefully, and ask your doctor or other care provider to review them with you.

## 2017-05-22 NOTE — NURSING NOTE
"Chief Complaint   Patient presents with     Rib Pain       Initial /73  Pulse 83  Temp 97.9  F (36.6  C) (Oral)  Ht 5' 7\" (1.702 m)  Wt 157 lb (71.2 kg)  SpO2 98%  BMI 24.59 kg/m2 Estimated body mass index is 24.59 kg/(m^2) as calculated from the following:    Height as of this encounter: 5' 7\" (1.702 m).    Weight as of this encounter: 157 lb (71.2 kg).  Medication Reconciliation: complete       Kang Valladares MA       "

## 2017-05-30 ENCOUNTER — TELEPHONE (OUTPATIENT)
Dept: CARDIOLOGY | Facility: CLINIC | Age: 52
End: 2017-05-30

## 2017-05-30 NOTE — TELEPHONE ENCOUNTER
Patient was seen by Dr. benavides on 5/12 and was advised to have a BMP and BNP lab draw 2 weeks after medication changes (see below).  Labs are not completed as of yet.  Attempted to contact patient to discuss his medications and need for labs to check kidney function and electrolytes but no answer.  Detailed message left stating he is due for labs, make a lab appt and to call the cardiology nurse if he has any questions regarding his cardiac medications and lab appt.    Leslie Larios RN    ASSESSMENT AND PLAN:  1. Congestive heart failure, stage C, NYHA class I, LVEF 12%, RVEF 30%, non-ischemic etiology. I have reviewed all imaging and available test results.  2. Euvolemic by clinical assessment.  3. Recommend:  -- Discontinue Toprol XL.  -- Start carvedilol 6.25 mg PO BID for 2 weeks; increase to 6.25 mg PO QAM and 9.375 mg PO QPM for 2 weeks; increase to 9.375 mg PO BID for 2 weeks; increase to 9.375 mg PO QAM and 12.5 mg PO QPM for 2 weeks; increase to 12.5 mg PO BID.  -- Lisinopril 5 mg PO BID.  -- Spironolactone 12.5 mg PO QD.  -- BMP and NT proBNP in 2 weeks.  -- Proceed with AICD placement as previously scheduled.  -- RTC in 6 months. Will uptitrate lisinopril at hat time, and then finally carvedilol once more up to the target dose of 25 mg PO BID if tolerated.     Thank you for your referral. Please do no not hesitate to call me directly should you have any questions regarding this evaluation.

## 2017-06-01 NOTE — TELEPHONE ENCOUNTER
Pt states that with the medication changes that Dr. Cardona did at the last OV, pt has been very fatigued to the point that he can not function normally. States he went back to his original doses and he feels just fine. Will review this information with Dr. Cardona and touch base with pt after his ICD placement next Thursday to determine a plan.    Pt verbalized understanding.    Henrique Amezcua RN

## 2017-06-08 ENCOUNTER — APPOINTMENT (OUTPATIENT)
Dept: CARDIOLOGY | Facility: CLINIC | Age: 52
End: 2017-06-08
Attending: INTERNAL MEDICINE
Payer: COMMERCIAL

## 2017-06-08 ENCOUNTER — APPOINTMENT (OUTPATIENT)
Dept: MEDSURG UNIT | Facility: CLINIC | Age: 52
End: 2017-06-08
Attending: INTERNAL MEDICINE
Payer: COMMERCIAL

## 2017-06-08 ENCOUNTER — APPOINTMENT (OUTPATIENT)
Dept: GENERAL RADIOLOGY | Facility: CLINIC | Age: 52
End: 2017-06-08
Attending: NURSE PRACTITIONER
Payer: COMMERCIAL

## 2017-06-08 ENCOUNTER — HOSPITAL ENCOUNTER (OUTPATIENT)
Facility: CLINIC | Age: 52
Discharge: HOME | End: 2017-06-09
Attending: INTERNAL MEDICINE | Admitting: INTERNAL MEDICINE
Payer: COMMERCIAL

## 2017-06-08 DIAGNOSIS — I42.8 CARDIOMYOPATHY, NONISCHEMIC (H): ICD-10-CM

## 2017-06-08 DIAGNOSIS — Z95.810 S/P ICD (INTERNAL CARDIAC DEFIBRILLATOR) PROCEDURE: Primary | ICD-10-CM

## 2017-06-08 LAB
ANION GAP SERPL CALCULATED.3IONS-SCNC: 7 MMOL/L (ref 3–14)
BUN SERPL-MCNC: 24 MG/DL (ref 7–30)
CALCIUM SERPL-MCNC: 9 MG/DL (ref 8.5–10.1)
CHLORIDE SERPL-SCNC: 104 MMOL/L (ref 94–109)
CO2 SERPL-SCNC: 27 MMOL/L (ref 20–32)
CREAT SERPL-MCNC: 0.9 MG/DL (ref 0.66–1.25)
ERYTHROCYTE [DISTWIDTH] IN BLOOD BY AUTOMATED COUNT: 13.9 % (ref 10–15)
GFR SERPL CREATININE-BSD FRML MDRD: 90 ML/MIN/1.7M2
GLUCOSE SERPL-MCNC: 103 MG/DL (ref 70–99)
HCT VFR BLD AUTO: 47 % (ref 40–53)
HGB BLD-MCNC: 15.6 G/DL (ref 13.3–17.7)
LACTATE BLD-SCNC: 1.2 MMOL/L (ref 0.7–2.1)
MCH RBC QN AUTO: 32 PG (ref 26.5–33)
MCHC RBC AUTO-ENTMCNC: 33.2 G/DL (ref 31.5–36.5)
MCV RBC AUTO: 96 FL (ref 78–100)
PLATELET # BLD AUTO: 219 10E9/L (ref 150–450)
POTASSIUM SERPL-SCNC: 4.2 MMOL/L (ref 3.4–5.3)
RBC # BLD AUTO: 4.88 10E12/L (ref 4.4–5.9)
SODIUM SERPL-SCNC: 138 MMOL/L (ref 133–144)
WBC # BLD AUTO: 14.9 10E9/L (ref 4–11)

## 2017-06-08 PROCEDURE — 99213 OFFICE O/P EST LOW 20 MIN: CPT | Mod: 25 | Performed by: NURSE PRACTITIONER

## 2017-06-08 PROCEDURE — 93010 ELECTROCARDIOGRAM REPORT: CPT | Performed by: INTERNAL MEDICINE

## 2017-06-08 PROCEDURE — 25000128 H RX IP 250 OP 636: Performed by: NURSE PRACTITIONER

## 2017-06-08 PROCEDURE — 02HK3KZ INSERTION OF DEFIBRILLATOR LEAD INTO RIGHT VENTRICLE, PERCUTANEOUS APPROACH: ICD-10-PCS | Performed by: INTERNAL MEDICINE

## 2017-06-08 PROCEDURE — 93010 ELECTROCARDIOGRAM REPORT: CPT | Mod: 59 | Performed by: INTERNAL MEDICINE

## 2017-06-08 PROCEDURE — 99153 MOD SED SAME PHYS/QHP EA: CPT

## 2017-06-08 PROCEDURE — 25000125 ZZHC RX 250: Performed by: NURSE PRACTITIONER

## 2017-06-08 PROCEDURE — 27210784 ZZH KIT PACEMAKER CR8

## 2017-06-08 PROCEDURE — 99153 MOD SED SAME PHYS/QHP EA: CPT | Performed by: INTERNAL MEDICINE

## 2017-06-08 PROCEDURE — 99152 MOD SED SAME PHYS/QHP 5/>YRS: CPT

## 2017-06-08 PROCEDURE — 27210957 ZZH ACCESS EP PROC CR5

## 2017-06-08 PROCEDURE — 33249 INSJ/RPLCMT DEFIB W/LEAD(S): CPT

## 2017-06-08 PROCEDURE — 33249 INSJ/RPLCMT DEFIB W/LEAD(S): CPT | Mod: Q0 | Performed by: INTERNAL MEDICINE

## 2017-06-08 PROCEDURE — 40000940 XR CHEST PORT 1 VW

## 2017-06-08 PROCEDURE — 93005 ELECTROCARDIOGRAM TRACING: CPT

## 2017-06-08 PROCEDURE — 99152 MOD SED SAME PHYS/QHP 5/>YRS: CPT | Performed by: INTERNAL MEDICINE

## 2017-06-08 PROCEDURE — 40000172 ZZH STATISTIC PROCEDURE PREP ONLY

## 2017-06-08 PROCEDURE — 71010 XR CHEST PORT 1 VW: CPT

## 2017-06-08 PROCEDURE — 0JH608Z INSERTION OF DEFIBRILLATOR GENERATOR INTO CHEST SUBCUTANEOUS TISSUE AND FASCIA, OPEN APPROACH: ICD-10-PCS | Performed by: INTERNAL MEDICINE

## 2017-06-08 PROCEDURE — 27210795 ZZH PAD DEFIB QUICK CR4

## 2017-06-08 PROCEDURE — 80048 BASIC METABOLIC PNL TOTAL CA: CPT | Performed by: INTERNAL MEDICINE

## 2017-06-08 PROCEDURE — C1722 AICD, SINGLE CHAMBER: HCPCS

## 2017-06-08 PROCEDURE — C1895 LEAD, AICD, ENDO DUAL COIL: HCPCS

## 2017-06-08 PROCEDURE — 25000132 ZZH RX MED GY IP 250 OP 250 PS 637: Performed by: NURSE PRACTITIONER

## 2017-06-08 PROCEDURE — 85027 COMPLETE CBC AUTOMATED: CPT | Performed by: INTERNAL MEDICINE

## 2017-06-08 PROCEDURE — 40000065 ZZH STATISTIC EKG NON-CHARGEABLE

## 2017-06-08 PROCEDURE — C1892 INTRO/SHEATH,FIXED,PEEL-AWAY: HCPCS

## 2017-06-08 PROCEDURE — 83605 ASSAY OF LACTIC ACID: CPT | Performed by: INTERNAL MEDICINE

## 2017-06-08 PROCEDURE — 27210807 ZZH SHEATH CR6

## 2017-06-08 PROCEDURE — 36415 COLL VENOUS BLD VENIPUNCTURE: CPT | Performed by: INTERNAL MEDICINE

## 2017-06-08 PROCEDURE — 25000128 H RX IP 250 OP 636: Performed by: INTERNAL MEDICINE

## 2017-06-08 RX ORDER — LORAZEPAM 2 MG/ML
.5-2 INJECTION INTRAMUSCULAR EVERY 10 MIN PRN
Status: DISCONTINUED | OUTPATIENT
Start: 2017-06-08 | End: 2017-06-08

## 2017-06-08 RX ORDER — CEFAZOLIN SODIUM 2 G/100ML
2 INJECTION, SOLUTION INTRAVENOUS
Status: COMPLETED | OUTPATIENT
Start: 2017-06-08 | End: 2017-06-08

## 2017-06-08 RX ORDER — LIDOCAINE 40 MG/G
CREAM TOPICAL
Status: DISCONTINUED | OUTPATIENT
Start: 2017-06-08 | End: 2017-06-08

## 2017-06-08 RX ORDER — OXYCODONE AND ACETAMINOPHEN 5; 325 MG/1; MG/1
1 TABLET ORAL EVERY 8 HOURS PRN
Status: DISCONTINUED | OUTPATIENT
Start: 2017-06-08 | End: 2017-06-09 | Stop reason: HOSPADM

## 2017-06-08 RX ORDER — LISINOPRIL 5 MG/1
5 TABLET ORAL 2 TIMES DAILY
Status: DISCONTINUED | OUTPATIENT
Start: 2017-06-08 | End: 2017-06-09

## 2017-06-08 RX ORDER — PROTAMINE SULFATE 10 MG/ML
5-40 INJECTION, SOLUTION INTRAVENOUS EVERY 10 MIN PRN
Status: DISCONTINUED | OUTPATIENT
Start: 2017-06-08 | End: 2017-06-08

## 2017-06-08 RX ORDER — SPIRONOLACTONE 25 MG
12.5 TABLET ORAL DAILY
Status: DISCONTINUED | OUTPATIENT
Start: 2017-06-08 | End: 2017-06-09 | Stop reason: HOSPADM

## 2017-06-08 RX ORDER — CARVEDILOL 6.25 MG/1
6.25 TABLET ORAL 2 TIMES DAILY WITH MEALS
Status: DISCONTINUED | OUTPATIENT
Start: 2017-06-09 | End: 2017-06-09

## 2017-06-08 RX ORDER — ATROPINE SULFATE 0.1 MG/ML
.5-1 INJECTION INTRAVENOUS
Status: DISCONTINUED | OUTPATIENT
Start: 2017-06-08 | End: 2017-06-08

## 2017-06-08 RX ORDER — ASPIRIN 81 MG/1
81 TABLET, CHEWABLE ORAL DAILY
Status: DISCONTINUED | OUTPATIENT
Start: 2017-06-09 | End: 2017-06-09 | Stop reason: HOSPADM

## 2017-06-08 RX ORDER — ONDANSETRON 2 MG/ML
4 INJECTION INTRAMUSCULAR; INTRAVENOUS EVERY 4 HOURS PRN
Status: DISCONTINUED | OUTPATIENT
Start: 2017-06-08 | End: 2017-06-08

## 2017-06-08 RX ORDER — DIPHENHYDRAMINE HYDROCHLORIDE 50 MG/ML
25-50 INJECTION INTRAMUSCULAR; INTRAVENOUS
Status: DISCONTINUED | OUTPATIENT
Start: 2017-06-08 | End: 2017-06-08

## 2017-06-08 RX ORDER — PROMETHAZINE HYDROCHLORIDE 25 MG/ML
6.25-25 INJECTION, SOLUTION INTRAMUSCULAR; INTRAVENOUS EVERY 4 HOURS PRN
Status: DISCONTINUED | OUTPATIENT
Start: 2017-06-08 | End: 2017-06-08

## 2017-06-08 RX ORDER — ADENOSINE 3 MG/ML
6-12 INJECTION, SOLUTION INTRAVENOUS EVERY 5 MIN PRN
Status: DISCONTINUED | OUTPATIENT
Start: 2017-06-08 | End: 2017-06-08

## 2017-06-08 RX ORDER — FLUMAZENIL 0.1 MG/ML
0.2 INJECTION, SOLUTION INTRAVENOUS
Status: DISCONTINUED | OUTPATIENT
Start: 2017-06-08 | End: 2017-06-08

## 2017-06-08 RX ORDER — PROTAMINE SULFATE 10 MG/ML
1-5 INJECTION, SOLUTION INTRAVENOUS
Status: DISCONTINUED | OUTPATIENT
Start: 2017-06-08 | End: 2017-06-08

## 2017-06-08 RX ORDER — FUROSEMIDE 10 MG/ML
20-100 INJECTION INTRAMUSCULAR; INTRAVENOUS
Status: DISCONTINUED | OUTPATIENT
Start: 2017-06-08 | End: 2017-06-08

## 2017-06-08 RX ORDER — CEPHALEXIN 500 MG/1
500 TABLET ORAL 3 TIMES DAILY
Qty: 15 TABLET | Refills: 0 | Status: SHIPPED | OUTPATIENT
Start: 2017-06-08 | End: 2017-06-09

## 2017-06-08 RX ORDER — CEFAZOLIN SODIUM 1 G/3ML
1 INJECTION, POWDER, FOR SOLUTION INTRAMUSCULAR; INTRAVENOUS EVERY 8 HOURS
Status: COMPLETED | OUTPATIENT
Start: 2017-06-08 | End: 2017-06-09

## 2017-06-08 RX ORDER — HEPARIN SODIUM 1000 [USP'U]/ML
1000-10000 INJECTION, SOLUTION INTRAVENOUS; SUBCUTANEOUS EVERY 5 MIN PRN
Status: DISCONTINUED | OUTPATIENT
Start: 2017-06-08 | End: 2017-06-08

## 2017-06-08 RX ORDER — NALOXONE HYDROCHLORIDE 0.4 MG/ML
0.4 INJECTION, SOLUTION INTRAMUSCULAR; INTRAVENOUS; SUBCUTANEOUS EVERY 5 MIN PRN
Status: DISCONTINUED | OUTPATIENT
Start: 2017-06-08 | End: 2017-06-08

## 2017-06-08 RX ORDER — NALOXONE HYDROCHLORIDE 0.4 MG/ML
.1-.4 INJECTION, SOLUTION INTRAMUSCULAR; INTRAVENOUS; SUBCUTANEOUS
Status: DISCONTINUED | OUTPATIENT
Start: 2017-06-08 | End: 2017-06-09 | Stop reason: HOSPADM

## 2017-06-08 RX ORDER — LIDOCAINE 40 MG/G
CREAM TOPICAL
Status: DISCONTINUED | OUTPATIENT
Start: 2017-06-08 | End: 2017-06-09 | Stop reason: HOSPADM

## 2017-06-08 RX ORDER — FENTANYL CITRATE 50 UG/ML
25-50 INJECTION, SOLUTION INTRAMUSCULAR; INTRAVENOUS
Status: DISCONTINUED | OUTPATIENT
Start: 2017-06-08 | End: 2017-06-08

## 2017-06-08 RX ORDER — IOPAMIDOL 755 MG/ML
10 INJECTION, SOLUTION INTRAVASCULAR ONCE
Status: COMPLETED | OUTPATIENT
Start: 2017-06-08 | End: 2017-06-08

## 2017-06-08 RX ORDER — KETOROLAC TROMETHAMINE 30 MG/ML
15-30 INJECTION, SOLUTION INTRAMUSCULAR; INTRAVENOUS
Status: DISCONTINUED | OUTPATIENT
Start: 2017-06-08 | End: 2017-06-08

## 2017-06-08 RX ORDER — DOBUTAMINE HYDROCHLORIDE 200 MG/100ML
5-40 INJECTION INTRAVENOUS CONTINUOUS PRN
Status: DISCONTINUED | OUTPATIENT
Start: 2017-06-08 | End: 2017-06-08

## 2017-06-08 RX ADMIN — FENTANYL CITRATE 25 MCG: 50 INJECTION, SOLUTION INTRAMUSCULAR; INTRAVENOUS at 09:49

## 2017-06-08 RX ADMIN — FENTANYL CITRATE 25 MCG: 50 INJECTION, SOLUTION INTRAMUSCULAR; INTRAVENOUS at 08:57

## 2017-06-08 RX ADMIN — CEFAZOLIN 1 G: 330 INJECTION, POWDER, FOR SOLUTION INTRAMUSCULAR; INTRAVENOUS at 17:30

## 2017-06-08 RX ADMIN — CEFAZOLIN SODIUM 2 G: 2 INJECTION, SOLUTION INTRAVENOUS at 08:38

## 2017-06-08 RX ADMIN — FENTANYL CITRATE 50 MCG: 50 INJECTION, SOLUTION INTRAMUSCULAR; INTRAVENOUS at 08:49

## 2017-06-08 RX ADMIN — FENTANYL CITRATE 25 MCG: 50 INJECTION, SOLUTION INTRAMUSCULAR; INTRAVENOUS at 10:06

## 2017-06-08 RX ADMIN — MIDAZOLAM HYDROCHLORIDE 0.5 MG: 1 INJECTION, SOLUTION INTRAMUSCULAR; INTRAVENOUS at 10:06

## 2017-06-08 RX ADMIN — IOPAMIDOL 10 ML: 755 INJECTION, SOLUTION INTRAVASCULAR at 10:15

## 2017-06-08 RX ADMIN — MIDAZOLAM HYDROCHLORIDE 0.5 MG: 1 INJECTION, SOLUTION INTRAMUSCULAR; INTRAVENOUS at 09:49

## 2017-06-08 RX ADMIN — FENTANYL CITRATE 25 MCG: 50 INJECTION, SOLUTION INTRAMUSCULAR; INTRAVENOUS at 09:25

## 2017-06-08 RX ADMIN — OXYCODONE HYDROCHLORIDE AND ACETAMINOPHEN 1 TABLET: 5; 325 TABLET ORAL at 22:25

## 2017-06-08 RX ADMIN — MIDAZOLAM HYDROCHLORIDE 0.5 MG: 1 INJECTION, SOLUTION INTRAMUSCULAR; INTRAVENOUS at 08:57

## 2017-06-08 RX ADMIN — MIDAZOLAM HYDROCHLORIDE 1 MG: 1 INJECTION, SOLUTION INTRAMUSCULAR; INTRAVENOUS at 08:49

## 2017-06-08 RX ADMIN — MIDAZOLAM HYDROCHLORIDE 0.5 MG: 1 INJECTION, SOLUTION INTRAMUSCULAR; INTRAVENOUS at 09:25

## 2017-06-08 RX ADMIN — OXYCODONE HYDROCHLORIDE AND ACETAMINOPHEN 1 TABLET: 5; 325 TABLET ORAL at 14:13

## 2017-06-08 ASSESSMENT — PAIN DESCRIPTION - DESCRIPTORS
DESCRIPTORS: ACHING;SORE
DESCRIPTORS: ACHING;SORE
DESCRIPTORS: SORE;TENDER
DESCRIPTORS: ACHING

## 2017-06-08 NOTE — H&P
Electrophysiology Pre-Procedure History and Physical    Akshat Fragoso MRN# [id number]   Age: 52 year old YOB: 1965      Date of Procedure: 6/8/2017  Location [facility]      Date of Exam 6/8/2017 Facility (Same day)       Home clinic: [facility]  Primary care provider: Denise White  HPI:   Mr. Fragoso is a 52 year old male who has a past medical history significant for NICM LVEF 12% and tobacco use. In January 2017, he was admitted with worsening JOHNSON and SOB and was found to have NICM. He had reported a 5 year history of JOHNSON that worsened over the last year. Coronary angiogram showed clean coronaries, and RHC with normal CI, and mildly elevated right and left heart filling pressures. He was started on optimal medical therapy. His JOHNSON/SOB has improved with medications. Repeat CMRI shows an LVEF of 12% and RVEF of 30%. He was then referred for ICD implant as primary prevention of SCD for which he presents today.        Active problem list:   Patient Active Problem List    Diagnosis Date Noted     Personal history of tobacco use, presenting hazards to health 01/18/2017     Priority: Medium     Pulmonary nodules, next CT due 1/2018 01/17/2017     Priority: Medium     Cardiomyopathy, nonischemic (H) 01/11/2017     Priority: Medium     Acute systolic heart failure (H) 01/10/2017     Priority: Medium     Cardiomyopathy, unspecified (H) 01/10/2017     Priority: Medium     CHF (congestive heart failure) (H) 01/09/2017     Priority: Medium     Personal history of smoking 01/04/2017     Priority: Medium     Acute right lumbar radiculopathy 11/02/2015     Priority: Medium     CARDIOVASCULAR SCREENING; LDL GOAL LESS THAN 160 05/09/2010            Medications (include herbals and vitamins):   Any Plavix use in the last 7 days? No     Current Facility-Administered Medications   Medication     lidocaine (LMX4) kit     lidocaine 1 % 1 mL     sodium chloride  (PF) 0.9% PF flush 3 mL     sodium chloride (PF) 0.9% PF flush 3 mL     ceFAZolin sodium-dextrose (ANCEF) infusion 2 g         [place] Akshat TANMAY   Home Medication Instructions MAR:[id number]    Printed on:06/08/17 0710   Medication Information                      aspirin 81 MG tablet  Take 81 mg by mouth daily             Blood Pressure Monitor KIT  1 kit daily             carvedilol (COREG) 6.25 MG tablet  Take 1 tablet (6.25 mg) by mouth 2 times daily (with meals)             furosemide (LASIX) 20 MG tablet  Take 1 tablet (20 mg) by mouth daily as needed Only take if weight up by 3lbs             lisinopril (PRINIVIL/ZESTRIL) 5 MG tablet  Take 1 tablet (5 mg) by mouth 2 times daily             oxyCODONE-acetaminophen (PERCOCET) 5-325 MG per tablet  Take 1 tablet by mouth every 8 hours as needed for pain             oxyCODONE-acetaminophen (PERCOCET) 5-325 MG per tablet  Take 1 tablet by mouth every 8 hours as needed for pain             spironolactone (ALDACTONE) 25 MG tablet  Take 0.5 tablets (12.5 mg) by mouth daily                      Allergies:      Allergies   Allergen Reactions     No Known Allergies      Allergy to Latex? No  Allergy to tape?   No  Intolerances: Na          Social History:     Social History   Substance Use Topics     Smoking status: Former Smoker     Packs/day: 0.50     Years: 15.00     Types: Cigarettes     Quit date: 12/1/2016     Smokeless tobacco: Former User     Quit date: 10/24/2011     Alcohol use No            Physical Exam:   All vitals have been reviewed  No data found.       Airway assessment:   Patient is able to open mouth wide  Patient is able to stick out tongue}      ENT:   Normocephalic, without obvious abnormality, atraumatic, sinuses nontender on palpation, external ears without lesions, oral pharynx with moist mucous membranes, tonsils without erythema or exudates, gums normal and good dentition.     Neck:   Supple, symmetrical, trachea midline, no adenopathy, thyroid  symmetric, not enlarged and no tenderness, skin normal     Lungs:   No increased work of breathing, good air exchange, clear to auscultation bilaterally, no crackles or wheezing     Cardiovascular:   Normal apical impulse, regular rate and rhythm, normal S1 and S2, no S3 or S4, and no murmur noted             Lab / Radiology Results:     Lab Results   Component Value Date    WBC 11.9 01/12/2017    RBC 5.34 01/12/2017    HGB 16.4 01/12/2017    HCT 51.1 01/12/2017    MCV 96 01/12/2017    RDW 13.6 01/12/2017     01/12/2017      Lab Results   Component Value Date    WBC 11.9 01/12/2017     Lab Results   Component Value Date     01/12/2017     Lab Results   Component Value Date    HGB 16.4 01/12/2017    HCT 51.1 01/12/2017     Lab Results   Component Value Date     01/12/2017    CO2 31 01/12/2017    BUN 12 01/12/2017     Lab Results   Component Value Date     01/12/2017    CO2 31 01/12/2017    BUN 12 01/12/2017     Lab Results   Component Value Date     01/12/2017     Lab Results   Component Value Date    BUN 12 01/12/2017     Lab Results   Component Value Date    TSH 1.34 01/09/2017             Plan:   Patient's active problems diagnostically and therapeutically optimized for the planned procedure  Patient here for ICD implant. Procedure explained in detail to patient including indications, risks, and benefits. The risk of defibrillator placement include: over sedation, reaction to local anesthetic, reaction to narcotics or benzodiazipines used for moderate secation, localized bleeding, internal bleeding, collapsed lung, and acute or late infections. There is the possibilty of unforseen complications as well such as device or lead failure, lead dislodgement, or inappropriate shocks from the defibrillator.  He states understanding and wishes to proceed.     Yumiko Dalton, VERONICA CNP  Electrophysiology Consult Service  Pager: [phone]

## 2017-06-08 NOTE — IP AVS SNAPSHOT
MRN:[id number]                      After Visit Summary   6/8/2017    Akshat Fragoso    MRN: [id number]           Thank you!     Thank you for choosing [facility] for your care. Our goal is always to provide you with excellent care. Hearing back from our patients is one way we can continue to improve our services. Please take a few minutes to complete the written survey that you may receive in the mail after you visit with us. Thank you!        Patient Information     Date Of Birth          1965        About your hospital stay     You were admitted on:  June 8, 2017 You last received care in the:  Unit 6D Observation [facility]    You were discharged on:  June 9, 2017       Who to Call     For medical emergencies, please call 911.  For non-urgent questions about your medical care, please call your primary care provider or clinic, [phone]          Attending Provider     Provider Specialty    Brynn Moy MD Cardiology       Primary Care Provider Office Phone # Fax #    Denise HICKS VERONICA [facility] [phone]      After Care Instructions     Discharge Instructions - Follow up with Device Check RN        Follow up with Device Check RN in 7-10 days.            Discharge Instructions - Keep incision dry for 72 hours       Keep incision dry for 72 hours (unless Derma Beltran was applied)                  Your next 10 appointments already scheduled     Miguel A 15, 2017  5:30 PM CDT   (Arrive by 5:15 PM)   Implanted Defibulator with Uc Cv Device 1   [facility])    [address] [place] [id number]   [phone]            Aug 04, 2017  4:30 PM CDT   (Arrive by 4:15 PM)   Return Visit with Channing Cardona MD   [facility])    [address] [place] [id number] [phone]            Sep 18, 2017 10:00 AM CDT   (Arrive by 9:45 AM)    Implanted Defibulator with Uc Cv Device 1   [facility] [place]    [address] [address]   [phone]            Sep 18, 2017 10:40 AM CDT   (Arrive by 10:25 AM)   RETURN ARRHYTHMIA with Brynn Moy MD   [facility] ([facility])    [address] [place] [phone]   [phone]              Further instructions from your care team         Home Care after an ICD implant    Wound care:  Keep your incision (surgery wound) dry for 3 days.  After 3 days, you may remove the outer bandage.  Keep the strips of tape on.  They will be removed at your clinic visit.  Check for signs of infection each day.  These include increased redness, swelling, drainage or a fever over 101 F (38.3 C).  Call us immediately if you see any of these signs.  If there are no signs of infection, you may shower in 3 days.  Do not submerge the incision (in a bath tub, hot tub, or swimming pool) until fully healed.  If Dermabond has been applied to your incision.  Do not apply powder or lotion to the incision for 14 days. You may shower in the morning.    Pain:   You may have mild to moderate pain for 3 to 5 days.  Take acetaminophen (Tylenol) or ibuprofen (Advil) for the pain.  Call us if the pain is severe or lasts more than 5 days.    Activity:  You should slowly go back to your normal activities after 24 hours.  Healing will take 4 to 6 weeks.  No driving for 3 days  Avoid climbing a ladder alone.  It is best to stay within 4 feet of the ground.  Avoid anything that may cause rough contact or a hard hit to your chest.  This includes football, hockey, and other contact sports.  Do not go swimming or boating alone.      For at least 4 weeks:  Do not raise your affected arm above your shoulder.  Do not use your affected arm to push, pull, or lift anything over 10 pounds.  Avoid repetitive upper body activities  for 6 weeks (ie: golf, swimming, and weight lifting)    Follow Up Visits:  Return to the clinic in 7 to 10 days to have your device and wound checked.    Telling others about your device:  Before you have any medical tests or treatments, tell the doctors, dentists, and other care providers about your device.  There are a few tests and treatments that may interfere with your device.  (These include MRI, radiation therapy, electrocautery, and others.)  Your care team may need to take special steps to keep you safe.  Before you leave the hospital, you will receive a temporary ID card.  A permanent card will be mailed to you about 6 to 8 weeks later.  Always carry the ID card with you.  It has important details about your device.  You should also get a Oxyrane UKert ID.  Please ask us for a [employer] brochure, or go to [email or web address].    Safety near electrical equipment:  All of these are safe to use when in good repair:    Microwaves    Radios    Cordless phone    Remote controls    Small electrical tools  Cell phones: Keep cell phones at least 6 inches from your device.  Do not carry the phone in a pocket near your device.  Security reinoso: It is okay to walk through security reinoso at the airports and department stores.  Tell airport security that you have a defibrillator.  They should keep the screening wand at least 6 inches from your device.  Full-body scanners are safe.    Avoid the following:    MRI tests in the hospital unless you have a MRI safe defibrillator.    Arc welding, chain saws and high-powered industrial or commercial tools.    Power lines, power plants and large power generators.    Electric body fat scales.    Magnetic mattress pads or pillow.    What to do after a shock from your ICD:  1. Stop what you re doing and rest.  2. If you feel fine before and after the shock, call the device clinic.  3. If you feel unwell or receive more than one shock, call 911 or go to the emergency room.  A shock  "could mean that your condition has changed and you may need to see a doctor.    Questions?  Please call [facility]    Device Nurse:          Business Hours:  [phone]    After Hours:  [phone]   Choose option 4, then ask for the                                                   on-call device nurse at job code [id number].    Your next device clinic appointment is scheduled on:     ___________________________ at _____________.                                                 [facility]  [facility] - Clinic [address]  [address]        Pending Results     Date and Time Order Name Status Description    6/9/2017 0005 X-ray Chest 2 vws* In process     6/8/2017 1041 EKG 12-lead, tracing only Preliminary     6/8/2017 0750 EKG 12-lead, tracing only Preliminary             Statement of Approval     Ordered          06/09/17 0952  I have reviewed and agree with all the recommendations and orders detailed in this document.  EFFECTIVE NOW     Approved and electronically signed by:  Yumiko Griffiths APRN CNP             Admission Information     Date & Time Provider Department Dept. Phone    6/8/2017 Brynn Moy MD Unit 6D Observation [facility] [phone]      Your Vitals Were     Blood Pressure Pulse Temperature Respirations Height Weight    118/75 69 98  F (36.7  C) (Oral) 18 1.702 m (5' 7\") 71.2 kg (156 lb 15.5 oz)    Pulse Oximetry BMI (Body Mass Index)                97% 24.58 kg/m2          MyChart Information     [employer] gives you secure access to your electronic health record. If you see a primary care provider, you can also send messages to your care team and make appointments. If you have questions, please call your primary care clinic.  If you do not have a primary care provider, please call [phone] and they will assist you.        Care EveryWhere ID     This is your Care EveryWhere ID. This could be used by " other organizations to access your [facility] medical records  [id number]           Review of your medicines      START taking        Dose / Directions    cephalexin 500 MG tablet        Dose:  500 mg   Take 500 mg by mouth 3 times daily   Quantity:  15 tablet   Refills:  0         CONTINUE these medicines which may have CHANGED, or have new prescriptions. If we are uncertain of the size of tablets/capsules you have at home, strength may be listed as something that might have changed.        Dose / Directions    * oxyCODONE-acetaminophen 5-325 MG per tablet   Commonly known as:  PERCOCET   This may have changed:  Another medication with the same name was added. Make sure you understand how and when to take each.   Used for:  Rib pain        Dose:  1 tablet   Take 1 tablet by mouth every 8 hours as needed for pain   Quantity:  50 tablet   Refills:  0       * oxyCODONE-acetaminophen 5-325 MG per tablet   Commonly known as:  PERCOCET   This may have changed:  Another medication with the same name was added. Make sure you understand how and when to take each.   Used for:  Rib pain        Dose:  1 tablet   Take 1 tablet by mouth every 8 hours as needed for pain   Quantity:  50 tablet   Refills:  0       * oxyCODONE-acetaminophen 5-325 MG per tablet   Commonly known as:  PERCOCET   This may have changed:  You were already taking a medication with the same name, and this prescription was added. Make sure you understand how and when to take each.        Dose:  1-2 tablet   Take 1-2 tablets by mouth every 8 hours as needed for moderate to severe pain   Quantity:  28 tablet   Refills:  0       * Notice:  This list has 3 medication(s) that are the same as other medications prescribed for you. Read the directions carefully, and ask your doctor or other care provider to review them with you.      CONTINUE these medicines which have NOT CHANGED        Dose / Directions    aspirin 81 MG tablet        Dose:  81 mg   Take 81 mg by  mouth daily   Refills:  0       Blood Pressure Monitor Kit   Used for:  Chronic congestive heart failure, unspecified congestive heart failure type (H)        Dose:  1 kit   1 kit daily   Quantity:  1 kit   Refills:  0       carvedilol 6.25 MG tablet   Commonly known as:  COREG   Used for:  Chronic congestive heart failure, unspecified congestive heart failure type (H)        Dose:  6.25 mg   Take 1 tablet (6.25 mg) by mouth 2 times daily (with meals)   Quantity:  90 tablet   Refills:  3       furosemide 20 MG tablet   Commonly known as:  LASIX   Used for:  JOHNSON (dyspnea on exertion), Chronic congestive heart failure, unspecified congestive heart failure type (H), Acute on chronic systolic congestive heart failure (H)        Dose:  20 mg   Take 1 tablet (20 mg) by mouth daily as needed Only take if weight up by 3lbs   Quantity:  60 tablet   Refills:  1       lisinopril 5 MG tablet   Commonly known as:  PRINIVIL/ZESTRIL   Used for:  Chronic congestive heart failure, unspecified congestive heart failure type (H)        Dose:  5 mg   Take 1 tablet (5 mg) by mouth 2 times daily   Quantity:  180 tablet   Refills:  3       spironolactone 25 MG tablet   Commonly known as:  ALDACTONE   Used for:  Chronic congestive heart failure, unspecified congestive heart failure type (H)        Dose:  12.5 mg   Take 0.5 tablets (12.5 mg) by mouth daily   Quantity:  45 tablet   Refills:  3       TOPROL XL PO        Dose:  50 mg   Take 50 mg by mouth daily   Refills:  0            Where to get your medicines      Some of these will need a paper prescription and others can be bought over the counter. Ask your nurse if you have questions.     Bring a paper prescription for each of these medications     cephalexin 500 MG tablet    oxyCODONE-acetaminophen 5-325 MG per tablet                Protect others around you: Learn how to safely use, store and throw away your medicines at www.disposemymeds.org.             Medication List: This is a  list of all your medications and when to take them. Check marks below indicate your daily home schedule. Keep this list as a reference.      Medications           Morning Afternoon Evening Bedtime As Needed    aspirin 81 MG tablet   Take 81 mg by mouth daily                                Blood Pressure Monitor Kit   1 kit daily                                carvedilol 6.25 MG tablet   Commonly known as:  COREG   Take 1 tablet (6.25 mg) by mouth 2 times daily (with meals)                                cephalexin 500 MG tablet   Take 500 mg by mouth 3 times daily                                furosemide 20 MG tablet   Commonly known as:  LASIX   Take 1 tablet (20 mg) by mouth daily as needed Only take if weight up by 3lbs                                lisinopril 5 MG tablet   Commonly known as:  PRINIVIL/ZESTRIL   Take 1 tablet (5 mg) by mouth 2 times daily   Last time this was given:  10 mg on 6/9/2017  8:46 AM                                * oxyCODONE-acetaminophen 5-325 MG per tablet   Commonly known as:  PERCOCET   Take 1 tablet by mouth every 8 hours as needed for pain   Last time this was given:  1 tablet on 6/9/2017  7:47 AM                                * oxyCODONE-acetaminophen 5-325 MG per tablet   Commonly known as:  PERCOCET   Take 1 tablet by mouth every 8 hours as needed for pain   Last time this was given:  1 tablet on 6/9/2017  7:47 AM                                * oxyCODONE-acetaminophen 5-325 MG per tablet   Commonly known as:  PERCOCET   Take 1-2 tablets by mouth every 8 hours as needed for moderate to severe pain   Last time this was given:  1 tablet on 6/9/2017  7:47 AM                                spironolactone 25 MG tablet   Commonly known as:  ALDACTONE   Take 0.5 tablets (12.5 mg) by mouth daily                                TOPROL XL PO   Take 50 mg by mouth daily   Last time this was given:  50 mg on 6/9/2017  8:46 AM                                * Notice:  This list has 3  medication(s) that are the same as other medications prescribed for you. Read the directions carefully, and ask your doctor or other care provider to review them with you.

## 2017-06-08 NOTE — PROGRESS NOTES
Pt arrives to 2a, with spouse, for ICD. H&P is up to date. Consent needs to be signed. Pt had a sip of milk with meds at 0600-Yumiko, NP notified and aware, ok to proceed. PIV placed, labs sent.

## 2017-06-08 NOTE — PROGRESS NOTES
Pt arrived to  at 10:30 am via a litter escorted by PACU RN. A&Ox4. Pt is settled to bed. Oriented to room and how to use call light. Pt is placed on cardiac monitor.

## 2017-06-08 NOTE — PLAN OF CARE
Problem: Discharge Planning  Goal: Discharge Planning (Adult, OB, Behavioral, Peds)  Pt is her s/p ICD implant. Arrived to floor at 10:30 am. A&Ox4. B/P came down to 80's/40's, Yumiko Dalton, nurse practitioner  was notified, she stated to continue to monitor, no orders at this point.B/P improved last one was 93/46.Left chest inncision site with dressing, clean and dry. Pt is on cardiac monitor, NSR. Up independent to bathroom, voided, but pt did not save. Ate 100% of his lunch.Plan for chest x-ray in the morning,to check for pneumothorax and lead position post implant. Continue to monitor B/p and notify primary as needed.  Sepsis protocol was triggered, lactic acid came back normal 1.2.

## 2017-06-08 NOTE — DISCHARGE INSTRUCTIONS
Home Care after an ICD implant    Wound care:  Keep your incision (surgery wound) dry for 3 days.  After 3 days, you may remove the outer bandage.  Keep the strips of tape on.  They will be removed at your clinic visit.  Check for signs of infection each day.  These include increased redness, swelling, drainage or a fever over 101 F (38.3 C).  Call us immediately if you see any of these signs.  If there are no signs of infection, you may shower in 3 days.  Do not submerge the incision (in a bath tub, hot tub, or swimming pool) until fully healed.  If Dermabond has been applied to your incision.  Do not apply powder or lotion to the incision for 14 days. You may shower in the morning.    Pain:   You may have mild to moderate pain for 3 to 5 days.  Take acetaminophen (Tylenol) or ibuprofen (Advil) for the pain.  Call us if the pain is severe or lasts more than 5 days.    Activity:  You should slowly go back to your normal activities after 24 hours.  Healing will take 4 to 6 weeks.  No driving for 3 days  Avoid climbing a ladder alone.  It is best to stay within 4 feet of the ground.  Avoid anything that may cause rough contact or a hard hit to your chest.  This includes football, hockey, and other contact sports.  Do not go swimming or boating alone.      For at least 4 weeks:  Do not raise your affected arm above your shoulder.  Do not use your affected arm to push, pull, or lift anything over 10 pounds.  Avoid repetitive upper body activities for 6 weeks (ie: golf, swimming, and weight lifting)    Follow Up Visits:  Return to the clinic in 7 to 10 days to have your device and wound checked.    Telling others about your device:  Before you have any medical tests or treatments, tell the doctors, dentists, and other care providers about your device.  There are a few tests and treatments that may interfere with your device.  (These include MRI, radiation therapy, electrocautery, and others.)  Your care team may need  to take special steps to keep you safe.  Before you leave the hospital, you will receive a temporary ID card.  A permanent card will be mailed to you about 6 to 8 weeks later.  Always carry the ID card with you.  It has important details about your device.  You should also get a MedicAlert ID.  Please ask us for a MedicAlert brochure, or go to [email or web address].    Safety near electrical equipment:  All of these are safe to use when in good repair:    Microwaves    Radios    Cordless phone    Remote controls    Small electrical tools  Cell phones: Keep cell phones at least 6 inches from your device.  Do not carry the phone in a pocket near your device.  Security reinoso: It is okay to walk through security reinoso at the airports and department stores.  Tell airport security that you have a defibrillator.  They should keep the screening wand at least 6 inches from your device.  Full-body scanners are safe.    Avoid the following:    MRI tests in the hospital unless you have a MRI safe defibrillator.    Arc welding, chain saws and high-powered industrial or commercial tools.    Power lines, power plants and large power generators.    Electric body fat scales.    Magnetic mattress pads or pillow.    What to do after a shock from your ICD:  1. Stop what you re doing and rest.  2. If you feel fine before and after the shock, call the device clinic.  3. If you feel unwell or receive more than one shock, call 911 or go to the emergency room.  A shock could mean that your condition has changed and you may need to see a doctor.    Questions?  Please call [facility]    Device Nurse:          Business Hours:  [phone]    After Hours:  [phone]   Choose option 4, then ask for the                                                   on-call device nurse at job code [id number].    Your next device clinic appointment is scheduled on:     ___________________________ at _____________.                                                  [facility]  [facility] - Clinic [address]  [address] [place]  [address]

## 2017-06-08 NOTE — IP AVS SNAPSHOT
Unit 6D Observation [address] [address]    Phone:  [phone]    Fax:  [phone]                                       After Visit Summary   6/8/2017    Akshat Fragoso    MRN: [id number]           After Visit Summary Signature Page     I have received my discharge instructions, and my questions have been answered. I have discussed any challenges I see with this plan with the nurse or doctor.    ..........................................................................................................................................  Patient/Patient Representative Signature      ..........................................................................................................................................  Patient Representative Print Name and Relationship to Patient    ..................................................               ................................................  Date                                            Time    ..........................................................................................................................................  Reviewed by Signature/Title    ...................................................              ..............................................  Date                                                            Time

## 2017-06-08 NOTE — OP NOTE
EP PROCEDURE NOTE    Procedures:  1. Single chamber ICD device implantation.  2. Left Subclavian Venogram.    Attending: Dr. Moy  EP Fellow: Dr. Lobato  Procedure Date: 6/8/2017    Pre-operative Diagnosis:  NICM, EF=20%, NYHA class II.  Additional Pacing Indication:   - none  Post-operative diagnosis:   Successful implantation of ICD device,  Primary prevention of SCD.  Complications: None.     Fluoroscopy time/dose: 4.4 minutes, 22 mGy.      Clinical Profile:  53 yo male with NICM who has been on goal directed medical therapy for over 9 months and has not recovered his EF. He is NYHA II and endorses JOHNSON as his main symptom.      PROCEDURE  The risks and benefits of the procedure were explained to the patient in full.  The risks of the procedure include, but are not limited to: pain, bleeding, blood transfusion reactions, infection, pneumothorax, perforation of vessels or heart, pericardial effusion, and death. Informed Consent was obtained. The patient was brought to the EP lab in a fasting and hemodynamically stable condition. The patient was prepped and draped in a sterile fashion.     Sedation: This procedure was performed under moderate sedation under the supervision of the the Staff Physician. The patient received 3 mg Versed and 150 mcg Fentanyl for a total procedural sedation time of 80 minutes. Heart rate, blood pressure, oxygen saturation, and patient responses were monitored throughout the procedure with the assistance of the RN.     The left chest wall was vigorously washed, prepped, and sterilely draped. The chest wall was anesthetized with 2% lidocaine. A subclavian venogram was performed    A 5 cm incision was made approximately 2 fingerbreadths from the clavicle. The subcutaneous tissue was carefully dissected down to the pectoral fascia. The dissection was carried inferiorly to form a subcutaneous pocket with adequate hemostasis provided by electrocautery. The subclavian vein was accessed via the  pocket and over the first rib under fluoroscopic guidance, and one guidewire was inserted down into the level of the IVC.    A peel away sheath was inserted through the guidewire. The guidewire and dilator was removed. A right ventricular lead was inserted through the sheath down to the RV apical/septum and was screwed into the myocardium. There was very good sensing and pacing threshold at this position. Ten volt Pacing was performed without diaphragmatic stimulation. The sheath was then peeled away, and the sleeve adapter was advanced over the lead. The lead was then secured to the muscle using 0-Ethibond suture.     The pocket was then vigorously washed with antibiotic solution. The right ventricular leads were inserted into the pulse generator. The pulse generator and the lead were inserted into the subcutaneous pocket and secured with a 0-Ethibond suture.      The pocket was then closed in 3 layers using 2-0 Vicryl for the deep layers and 4-0 Vicryl for the subcuticular layer. Dermabond and a dressing were then applied over the incision site, and the patient was transported to a monitored bed.    Dr. Moy was present throughout the entire procedure.    EQUIPMENT  1.The Pulse Generator is a  BiocroÃƒÂ­ D150, Serial [id number].   2.The RV Lead is a Reliance 2093, Serial [id number].    MEASUREMENTS  The RV is sensing R waves of 25 mV and a pacing capture threshold of 0.5 V @ 04 msec with an impedance of 535 ohms.     FINAL PROGRAMMING  Jesus Settings:  -Pacing mode: VVI.  -Lower Rate Limit: 40 ppm.    Tachy Settings:  -VT Zone: set at 170 bpm, Therapy: Monitor.  -VF zone: set at 200 bpm, Therapy: 41J with quick convert.    PLAN  1. Wound care.  2. Antibiotics: Cephalexin for 5 days.    3. CXR and Device interrogation in [facility].    Ming Lobato M.D.  Cardiac Electrophysiology Fellow    I was present during the entire procedure.    Brynn Moy MD, MS  EP/Cardiology Staff

## 2017-06-09 ENCOUNTER — APPOINTMENT (OUTPATIENT)
Dept: GENERAL RADIOLOGY | Facility: CLINIC | Age: 52
End: 2017-06-09
Attending: NURSE PRACTITIONER
Payer: COMMERCIAL

## 2017-06-09 ENCOUNTER — ALLIED HEALTH/NURSE VISIT (OUTPATIENT)
Dept: CARDIOLOGY | Facility: CLINIC | Age: 52
End: 2017-06-09
Attending: INTERNAL MEDICINE
Payer: COMMERCIAL

## 2017-06-09 VITALS
OXYGEN SATURATION: 97 % | TEMPERATURE: 98 F | DIASTOLIC BLOOD PRESSURE: 75 MMHG | HEIGHT: 67 IN | HEART RATE: 69 BPM | SYSTOLIC BLOOD PRESSURE: 118 MMHG | BODY MASS INDEX: 24.64 KG/M2 | WEIGHT: 156.97 LBS | RESPIRATION RATE: 18 BRPM

## 2017-06-09 DIAGNOSIS — I42.8 CARDIOMYOPATHY, NONISCHEMIC (H): Primary | ICD-10-CM

## 2017-06-09 LAB
INTERPRETATION ECG - MUSE: NORMAL
INTERPRETATION ECG - MUSE: NORMAL

## 2017-06-09 PROCEDURE — 25000128 H RX IP 250 OP 636: Performed by: NURSE PRACTITIONER

## 2017-06-09 PROCEDURE — 25000132 ZZH RX MED GY IP 250 OP 250 PS 637: Performed by: INTERNAL MEDICINE

## 2017-06-09 PROCEDURE — 25000132 ZZH RX MED GY IP 250 OP 250 PS 637: Performed by: NURSE PRACTITIONER

## 2017-06-09 PROCEDURE — 93287 PERI-PX DEVICE EVAL & PRGR: CPT | Mod: 26 | Performed by: INTERNAL MEDICINE

## 2017-06-09 PROCEDURE — 71020 XR CHEST 2 VW: CPT

## 2017-06-09 PROCEDURE — 99214 OFFICE O/P EST MOD 30 MIN: CPT | Mod: 25 | Performed by: NURSE PRACTITIONER

## 2017-06-09 PROCEDURE — 93287 PERI-PX DEVICE EVAL & PRGR: CPT | Mod: ZF

## 2017-06-09 RX ORDER — OXYCODONE AND ACETAMINOPHEN 5; 325 MG/1; MG/1
1-2 TABLET ORAL EVERY 8 HOURS PRN
Qty: 28 TABLET | Refills: 0 | Status: SHIPPED | OUTPATIENT
Start: 2017-06-09 | End: 2017-08-21

## 2017-06-09 RX ORDER — METOPROLOL SUCCINATE 25 MG/1
50 TABLET, EXTENDED RELEASE ORAL DAILY
Status: DISCONTINUED | OUTPATIENT
Start: 2017-06-09 | End: 2017-06-09 | Stop reason: HOSPADM

## 2017-06-09 RX ORDER — LISINOPRIL 10 MG/1
10 TABLET ORAL DAILY
Status: DISCONTINUED | OUTPATIENT
Start: 2017-06-09 | End: 2017-06-09 | Stop reason: HOSPADM

## 2017-06-09 RX ORDER — OXYCODONE AND ACETAMINOPHEN 5; 325 MG/1; MG/1
1 TABLET ORAL ONCE
Status: COMPLETED | OUTPATIENT
Start: 2017-06-09 | End: 2017-06-09

## 2017-06-09 RX ORDER — CEPHALEXIN 500 MG/1
500 TABLET ORAL 3 TIMES DAILY
Qty: 15 TABLET | Refills: 0 | Status: SHIPPED | OUTPATIENT
Start: 2017-06-09 | End: 2017-08-21

## 2017-06-09 RX ADMIN — ASPIRIN 81 MG 81 MG: 81 TABLET ORAL at 07:47

## 2017-06-09 RX ADMIN — CEFAZOLIN 1 G: 330 INJECTION, POWDER, FOR SOLUTION INTRAMUSCULAR; INTRAVENOUS at 08:46

## 2017-06-09 RX ADMIN — OXYCODONE HYDROCHLORIDE AND ACETAMINOPHEN 1 TABLET: 5; 325 TABLET ORAL at 01:27

## 2017-06-09 RX ADMIN — CEFAZOLIN 1 G: 330 INJECTION, POWDER, FOR SOLUTION INTRAMUSCULAR; INTRAVENOUS at 00:48

## 2017-06-09 RX ADMIN — METOPROLOL SUCCINATE 50 MG: 25 TABLET, FILM COATED, EXTENDED RELEASE ORAL at 08:46

## 2017-06-09 RX ADMIN — OXYCODONE HYDROCHLORIDE AND ACETAMINOPHEN 1 TABLET: 5; 325 TABLET ORAL at 07:47

## 2017-06-09 RX ADMIN — LISINOPRIL 10 MG: 10 TABLET ORAL at 08:46

## 2017-06-09 NOTE — PROGRESS NOTES
Pt seen on 6D for evaluation and iterative programming of a [employer] Single Lead ICD s/p device implant 2017, per MD orders. Today his intrinsic rhythm is SR. Normal ICD function. 65 BPM. No arrhythmias recorded.  Pt reports that he is feeling well and denies specific complaints. Battery estimates >8 years to NEETA. Teaching provided in written and verbal forms to patient and his wife. Plan for pt to return on 6/15/2017 for a device and incision check.  RV lead:  Impedance: 545 ohms  HV Impedance: 73 ohms  Sensin.3 mV  Threshold: 0.40 V @ 0.50 ms    ICD Single Lead

## 2017-06-09 NOTE — PROGRESS NOTES
"S/p Single chamber ICD device implantation and Left Subclavian Venogram. Pt  A&OX4, AVSS. L subclavian dressing CDI, CMS intact in extremity.Left arm protected in sling. Incisional pain treated with Percocet with relief. On reg diet, denies n/v. Voiding independently.Sinus rhythm on cardiac monitoring, HR~70. Denies chest pain and SOB. Chest x-ray and device interrogation scheduled for morning. Will continue plan of care.  /73  Pulse 69  Temp 98  F (36.7  C) (Oral)  Resp 18  Ht 1.702 m (5' 7\")  Wt 71.2 kg (156 lb 15.5 oz)  SpO2 97%  BMI 24.58 kg/m2    "

## 2017-06-09 NOTE — PROGRESS NOTES
DISCHARGE   Discharged to: Home  Via: Automobile  Accompanied by: Family  Discharge Instructions: diet, activity, medications, follow up appointments, when to call the MD, and what to watchout for (i.e. s/s of infection, increasing SOB, palpitations, chest pain,)  Prescriptions: To be filled by pharmacy of patients preference request; medication list reviewed & sent with pt  Follow Up Appointments: arranged; information given  Belongings: All sent with pt  IV: out  Telemetry: off  Pt exhibits understanding of above discharge instructions; all questions answered.  Discharge Paperwork: faxed    ICD instructions and limitations reviewed w/ patient and wife and they expressed understanding.

## 2017-06-09 NOTE — PROGRESS NOTES
"/54  Pulse 70  Temp 98.2  F (36.8  C) (Oral)  Resp 18  Ht 1.702 m (5' 7\")  Wt 71.2 kg (156 lb 15.5 oz)  SpO2 97%  BMI 24.58 kg/m2  Left chest dressing is CDI. Left arm is in sling. Patient tolerated dinner without issue. He has not gotten out of bed much this evening, feels tired. Chest xray scheduled for the morning to monitor ICD placement and rule out pneumothorax. He is alert and oriented x 4 and able to make needs known. Will continue to monitor.  "

## 2017-06-09 NOTE — MR AVS SNAPSHOT
After Visit Summary   6/9/2017    Akshat Fragoso    MRN: [id number]           Patient Information     Date Of Birth          1965        Visit Information        Provider Department      6/9/2017 6:00 AM 1, Uc Cv Device [facility]        Today's Diagnoses     Cardiomyopathy, nonischemic (H)    -  1       Follow-ups after your visit        Follow-up notes from your care team     Return in about 6 days (around 6/15/2017).      Your next 10 appointments already scheduled     Miguel A 15, 2017  5:30 PM CDT   (Arrive by 5:15 PM)   Implanted Defibulator with Uc Cv Device 1   [facility] ([facility])    [address] [place] [id number] [phone]            Aug 04, 2017  4:30 PM CDT   (Arrive by 4:15 PM)   Return Visit with Channing Cardona MD   [facility] ([facility])    [address] [place] [id number] [phone]            Sep 18, 2017 10:00 AM CDT   (Arrive by 9:45 AM)   Implanted Defibulator with Uc Cv Device 1   [facility] ([facility])    [address] [place] [id number] [phone]            Sep 18, 2017 10:40 AM CDT   (Arrive by 10:25 AM)   RETURN ARRHYTHMIA with Brynn Moy MD   [facility] ([facility])    [address] [place] [id number] [phone]              Who to contact     If you have questions or need follow up information about today's clinic visit or your schedule please contact [facility] directly at [phone].  Normal or non-critical lab and imaging results will be communicated to you by MyChart, letter or phone within 4 business days after the clinic has received the results. If you do not hear from us within 7 days, please contact the clinic through MyChart or phone. If you have a critical or abnormal lab  result, we will notify you by phone as soon as possible.  Submit refill requests through [employer] or call your pharmacy and they will forward the refill request to us. Please allow 3 business days for your refill to be completed.          Additional Information About Your Visit        [employer] Information     [employer] gives you secure access to your electronic health record. If you see a primary care provider, you can also send messages to your care team and make appointments. If you have questions, please call your primary care clinic.  If you do not have a primary care provider, please call [phone] and they will assist you.        Care EveryWhere ID     This is your Care EveryWhere ID. This could be used by other organizations to access your [place] medical records  [id number]         Blood Pressure from Last 3 Encounters:   06/09/17 118/75   05/22/17 122/64   05/12/17 124/70    Weight from Last 3 Encounters:   06/08/17 71.2 kg (156 lb 15.5 oz)   05/22/17 71.2 kg (157 lb)   01/25/17 68.5 kg (151 lb)              We Performed the Following     MIKAELA-PROC DEVICE EVAL/PROGRAMMING, ICD          Today's Medication Changes      Notice     This visit is during an admission. Changes to the med list made in this visit will be reflected in the After Visit Summary of the admission.             Primary Care Provider Office Phone # Fax #    EVRONICA Hallman [facility] [phone]       [facility] [address] [place] [address]        Thank you!     Thank you for choosing [facility]  for your care. Our goal is always to provide you with excellent care. Hearing back from our patients is one way we can continue to improve our services. Please take a few minutes to complete the written survey that you may receive in the mail after your visit with us. Thank you!             Your Updated Medication List - Protect others around you: Learn how to safely use, store and throw  away your medicines at www.disposemymeds.org.      Notice     This visit is during an admission. Changes to the med list made in this visit will be reflected in the After Visit Summary of the admission.

## 2017-06-09 NOTE — DISCHARGE SUMMARY
"  Electrophysiology Discharge Summary  Date of Procedure: 6/8/17  DOS: 6/9/2017   Pre-operative Diagnosis:  NICM, EF=20%, NYHA class II.  Post-operative diagnosis:   Successful implantation of ICD device,  Primary prevention of SCD.  Hospital Course:  Mr. Fragoso is a 52 year old male who has a past medical history significant for NICM LVEF 12% and tobacco use. In January 2017, he was admitted with worsening JOHNSON and SOB and was found to have NICM. He had reported a 5 year history of JOHNSON that worsened over the last year. Coronary angiogram showed clean coronaries, and RHC with normal CI, and mildly elevated right and left heart filling pressures. He was started on optimal medical therapy. His JOHNSON/SOB has improved with medications. Repeat CMRI shows an LVEF of 12% and RVEF of 30%. He was then referred for ICD implant.   Patient underwent a successful ICD implant as primary prevention of SCD yesterday. He had an uneventful overnight stay. Device interrogation shows normal device function and stable lead parameters. Chest x-ray demonstrates no evidence of pneumothorax. Left subclavian site is CDI, no bleeding, and no hematoma. Patient is tolerating oral intake, ambulating at baseline, and voiding without difficulties. Patient remains hemodynamically stable.     ROS:   10 point ROS neg other than the symptoms noted above.   Exam:  /73  Pulse 69  Temp 98  F (36.7  C) (Oral)  Resp 18  Ht 1.702 m (5' 7\")  Wt 71.2 kg (156 lb 15.5 oz)  SpO2 97%  BMI 24.58 kg/m2    Constitutional: healthy, alert and no distress  Head: Normocephalic. No masses, lesions, tenderness or abnormalities  Neck: Neck supple. No adenopathy. Thyroid symmetric, normal size,, Carotids without bruits.  ENT: ENT exam normal, no neck nodes or sinus tenderness  Cardiovascular: negative, PMI normal. No lifts, heaves, or thrills. RRR. No murmurs, clicks gallops or rub  Respiratory: negative, Percussion normal. Good diaphragmatic excursion. Lungs " clear  Gastrointestinal: Abdomen soft, non-tender. BS normal. No masses, organomegaly  : Deferred  Musculoskeletal: extremities normal- no gross deformities noted, gait normal and normal muscle tone  Skin: no suspicious lesions or rashes  Neurologic: Gait normal. Reflexes normal and symmetric. Sensation grossly WNL.  Psychiatric: mentation appears normal and affect normal/bright  Discharge Medications:   Akshat Fragoos   Home Medication Instructions MAR:[id number]    Printed on:06/09/17 7829   Medication Information                      aspirin 81 MG tablet  Take 81 mg by mouth daily             Blood Pressure Monitor KIT  1 kit daily             carvedilol (COREG) 6.25 MG tablet  Take 1 tablet (6.25 mg) by mouth 2 times daily (with meals)             cephalexin 500 MG tablet  Take 500 mg by mouth 3 times daily for 5 days             furosemide (LASIX) 20 MG tablet  Take 1 tablet (20 mg) by mouth daily as needed Only take if weight up by 3lbs             lisinopril (PRINIVIL/ZESTRIL) 5 MG tablet  Take 1 tablet (5 mg) by mouth 2 times daily             Metoprolol Succinate (TOPROL XL PO)  Take 50 mg by mouth daily             oxyCODONE-acetaminophen (PERCOCET) 5-325 MG per tablet  Take 1 tablet by mouth every 8 hours as needed for pain             oxyCODONE-acetaminophen (PERCOCET) 5-325 MG per tablet  Take 1 tablet by mouth every 8 hours as needed for pain             spironolactone (ALDACTONE) 25 MG tablet  Take 0.5 tablets (12.5 mg) by mouth daily               Plan & Follow up:    Follow up with device clinic in 7-10 days    Oral antibiotics x 5 days    Keep incision clean and dry for 72 hours post procedure    No lifting > 10lbs or over shoulder level with left arm for 4 weeks    Notify device clinic/EP immediately for any redness, swelling, bleeding, discharge, fevers or chills.     Discharge to home.       VERONICA Hood CNP  Electrophysiology Consult Service  Pager: [phone]

## 2017-06-12 NOTE — TELEPHONE ENCOUNTER
Message sent to Dr. Cardona to review d/c summaries from ICD placement and adjust meds as needed.    Henrique Amezcua RN

## 2017-06-12 NOTE — TELEPHONE ENCOUNTER
Per Dr. Cardona, pt to follow titration schedule and medication changes as discussed at last OV:    1.  Dr. Cardona has requested the following medication changes:     -increase your lisinopril 5mg to TWICE a day  -Start Spironolactone 12.5mg per day (1/2 tablet)  -Stopping Metoprolol   -Starting Coreg 6.25mg     2. With the Coreg, you are to follow this titration schedule:   -take 6.25mg TWICE per day for 2 weeks (today until May 26th)   -take 6.25mg in the AM and 9.375mg in the PM for 2 weeks (May 27th-June 10th)  -take 9.375mg TWICE per day for 2 weeks (June 11th-June 25th)  -take 9.375mg in AM and 12.5mg in the PM for 2 weeks (June 26th-July 10th)  -take 12.5mg TWICE a day for 2 weeks (July 11th-July 25th)    Left message for pt to call clinic back to discuss.    Henrique Amezcua RN

## 2017-06-13 ENCOUNTER — MYC MEDICAL ADVICE (OUTPATIENT)
Dept: CARDIOLOGY | Facility: CLINIC | Age: 52
End: 2017-06-13

## 2017-06-13 NOTE — TELEPHONE ENCOUNTER
All orders reviewed with patient via phone and a [employer] message was sent for patient to review and follow.  Patient states he is doing well since his ICD placement and does not have any questions or concerns at this time.      Leslie Larios RN  Care Coordinator  [facility]  [phone]

## 2017-06-26 ENCOUNTER — ALLIED HEALTH/NURSE VISIT (OUTPATIENT)
Dept: CARDIOLOGY | Facility: CLINIC | Age: 52
End: 2017-06-26
Attending: INTERNAL MEDICINE
Payer: COMMERCIAL

## 2017-06-26 DIAGNOSIS — I42.8 CARDIOMYOPATHY, NONISCHEMIC (H): Primary | ICD-10-CM

## 2017-06-26 PROCEDURE — 93282 PRGRMG EVAL IMPLANTABLE DFB: CPT | Mod: 26 | Performed by: INTERNAL MEDICINE

## 2017-06-26 PROCEDURE — 93282 PRGRMG EVAL IMPLANTABLE DFB: CPT | Mod: ZF

## 2017-06-26 NOTE — PROGRESS NOTES
Patient seen at [facility] for evaluation and iterative programming of a [employer] Single  Lead ICD per MD orders. Patient has an appointment for device only today. Normal ICD function.  2 VT monitor episodes recorded on 6/12/2017 and 6/16/2017- both rates at 171 BPM for 33-35 seconds, EGM's appear sinus morphology. Intrinsic rhythm = SR @ 89 bpm.  = 2%. Estimated battery longevity = 12 years to NEETA. RV lead impedance trends appear stable. Patient reports that he is feeling well and denies complaints. Plan for patient to return to clinic on 9/26/2017. Incision looks good, dermabond has almost dissolved, site no redness or swelling.     ICD Single Lead

## 2017-06-26 NOTE — MR AVS SNAPSHOT
After Visit Summary   6/26/2017    Akshat Fragoso    MRN: [id number]           Patient Information     Date Of Birth          1965        Visit Information        Provider Department      6/26/2017 4:00 PM 1, Uc Cv Device [facility]        Care Instructions    It was a pleasure to see you in clinic today.  Please do not hesitate to call with any questions or concerns.  We look forward to seeing you in clinic at your next device check in 3 months.    Mirella Gao, RN, MS, CCRN  Electrophysiology Nurse Clinician  [facility]    During Business Hours Please Call:  [phone]  After Hours Please Call:  [phone] - select option #4 and ask for job code [phone]                    Follow-ups after your visit        Follow-up notes from your care team     Return in about 3 months (around 9/26/2017) for ICD Check.      Your next 10 appointments already scheduled     Aug 04, 2017  4:30 PM CDT   (Arrive by 4:15 PM)   Return Visit with Channing Cardona MD   [facility] ([facility])    [address] [place] [id number] [phone]            Sep 18, 2017 10:00 AM CDT   (Arrive by 9:45 AM)   Implanted Defibulator with Uc Cv Device 1   [facility] ([facility])    [address] [place] [id number] [phone]            Sep 18, 2017 10:40 AM CDT   (Arrive by 10:25 AM)   RETURN ARRHYTHMIA with Brynn Moy MD   [facility] ([facility])    [address] [place] [id number] [phone]              Who to contact     If you have questions or need follow up information about today's clinic visit or your schedule please contact [facility] directly at [phone].  Normal or non-critical lab and imaging results will be communicated to you by MyChart, letter or phone within 4 business days  after the clinic has received the results. If you do not hear from us within 7 days, please contact the clinic through [employer] or phone. If you have a critical or abnormal lab result, we will notify you by phone as soon as possible.  Submit refill requests through [employer] or call your pharmacy and they will forward the refill request to us. Please allow 3 business days for your refill to be completed.          Additional Information About Your Visit        [employer] Information     [employer] gives you secure access to your electronic health record. If you see a primary care provider, you can also send messages to your care team and make appointments. If you have questions, please call your primary care clinic.  If you do not have a primary care provider, please call [phone] and they will assist you.        Care EveryWhere ID     This is your Care EveryWhere ID. This could be used by other organizations to access your [place] medical records  [id number]         Blood Pressure from Last 3 Encounters:   06/09/17 118/75   05/22/17 122/64   05/12/17 124/70    Weight from Last 3 Encounters:   06/08/17 71.2 kg (156 lb 15.5 oz)   05/22/17 71.2 kg (157 lb)   01/25/17 68.5 kg (151 lb)              Today, you had the following     No orders found for display       Primary Care Provider Office Phone # Fax #    Denise VERONICA Blood [facility] [phone]       [facility] [address] [place] [address]        Equal Access to Services     MAXIMINO TALAMANTES : Hadii aad ku hadasho Soomaali, waaxda luqadaha, qaybta kaalmada adeegyada, waxay juanain haysonido sanchez . So [facility] [phone].    ATENCIÓN: Si habla español, tiene a whipple disposición servicios gratuitos de asistencia lingüística. Llame al [phone].    We comply with applicable federal civil rights laws and [place] laws. We do not discriminate on the basis of race, color, national origin, age, disability sex, sexual  orientation or gender identity.            Thank you!     Thank you for choosing [facility]  for your care. Our goal is always to provide you with excellent care. Hearing back from our patients is one way we can continue to improve our services. Please take a few minutes to complete the written survey that you may receive in the mail after your visit with us. Thank you!             Your Updated Medication List - Protect others around you: Learn how to safely use, store and throw away your medicines at www.disposemymeds.org.          This list is accurate as of: 6/26/17  4:25 PM.  Always use your most recent med list.                   Brand Name Dispense Instructions for use Diagnosis    aspirin 81 MG tablet      Take 81 mg by mouth daily        Blood Pressure Monitor Kit     1 kit    1 kit daily    Chronic congestive heart failure, unspecified congestive heart failure type (H)       carvedilol 6.25 MG tablet    COREG    90 tablet    Take 1 tablet (6.25 mg) by mouth 2 times daily (with meals)    Chronic congestive heart failure, unspecified congestive heart failure type (H)       cephalexin 500 MG tablet     15 tablet    Take 500 mg by mouth 3 times daily    S/P ICD (internal cardiac defibrillator) procedure       furosemide 20 MG tablet    LASIX    60 tablet    Take 1 tablet (20 mg) by mouth daily as needed Only take if weight up by 3lbs    JOHNSON (dyspnea on exertion), Chronic congestive heart failure, unspecified congestive heart failure type (H), Acute on chronic systolic congestive heart failure (H)       lisinopril 5 MG tablet    PRINIVIL/ZESTRIL    180 tablet    Take 1 tablet (5 mg) by mouth 2 times daily    Chronic congestive heart failure, unspecified congestive heart failure type (H)       * oxyCODONE-acetaminophen 5-325 MG per tablet    PERCOCET    50 tablet    Take 1 tablet by mouth every 8 hours as needed for pain    Rib pain       * oxyCODONE-acetaminophen 5-325 MG per tablet    PERCOCET    50  tablet    Take 1 tablet by mouth every 8 hours as needed for pain    Rib pain       * oxyCODONE-acetaminophen 5-325 MG per tablet    PERCOCET    28 tablet    Take 1-2 tablets by mouth every 8 hours as needed for moderate to severe pain    S/P ICD (internal cardiac defibrillator) procedure       spironolactone 25 MG tablet    ALDACTONE    45 tablet    Take 0.5 tablets (12.5 mg) by mouth daily    Chronic congestive heart failure, unspecified congestive heart failure type (H)       * Notice:  This list has 3 medication(s) that are the same as other medications prescribed for you. Read the directions carefully, and ask your doctor or other care provider to review them with you.

## 2017-06-26 NOTE — PATIENT INSTRUCTIONS
It was a pleasure to see you in clinic today.  Please do not hesitate to call with any questions or concerns.  We look forward to seeing you in clinic at your next device check in 3 months.    Mirella Gao, RN, MS, CCRN  Electrophysiology Nurse Clinician  [facility]    During Business Hours Please Call:  [phone]  After Hours Please Call:  [phone] - select option #4 and ask for job code [phone]

## 2017-07-08 ENCOUNTER — TELEPHONE (OUTPATIENT)
Dept: CARDIOLOGY | Facility: CLINIC | Age: 52
End: 2017-07-08

## 2017-07-09 NOTE — TELEPHONE ENCOUNTER
Pts wife called tonight, she was trying to send a remote ICD transmission and the lights on the monitor got stuck.  I asked her to unplug the monitor for 20 sec and then plug it back in, this was done and she successfully transmitted her husbands ICD check.  She states that he has been very tired tonight with some chest tightness, the patient believes that he is just tired but his wife Cheryl is worried and wondering if she should take him in.  I told her that he if he is chest discomfort that does not go away with rest, then he should definitely go in.  His ICD looks great, his presenting rhythm is SR 62 bpm, he RV paces <0.1% of the time, his heart rate histograms show good heart rate variation and his activity trends show that he has been more active than usual this past week.  His ICD battery estimates 12 years left and his ICD is functioning normally.      Remote ICD

## 2017-08-03 ENCOUNTER — PRE VISIT (OUTPATIENT)
Dept: CARDIOLOGY | Facility: CLINIC | Age: 52
End: 2017-08-03

## 2017-08-03 NOTE — TELEPHONE ENCOUNTER
4/17/17--Cardiac MRI  IMPRESSION:  1.  Severely dilated left ventricle with severely reduced global  function with a calculated ejection fraction of  12 %.  2.  Moderately dilated right ventricle with moderately reduced global  function with a calculated ejection fraction of 30%.   3.  There is mild mitral and tricuspid regurgitation.  4.  The left atrium is severely dilated. The right atrium is mildly  dilated.  5.  On delayed enhancement imaging, there is extensive  hyperenhancement in multiple non-ischemic patterns: transmural,  mid-myocardial, subendocardial, and epicardial. 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 The extent and  distribution of hyperenhancement are unchanged from the prior study.  6.  Compared with the prior study dated 1/11/2017, right ventricular  function has improved.     1/11/17--Cardiac MRI  Severe non-ischemic cardiomyopathy with biventricular involvement.  LVEF of 9% and RVEF of 18%. Extensive fibrosis that is suggestive of  an inflammatory etiology to the cardiomyopathy such as a fulminant  myocarditis. LV thinning is suggestive of a subacute or chronic  timeline for the cardiomyopathy (i.e., the myocarditis does not appear  to be acute).    1/11/17--  PROCEDURES PERFORMED:   1. Selective left and right coronary angiography.  2.  Right heart catheterization.     PHYSICIANS:  1. Attending Interventional Cardiology Staff: Paras Pang MD  2. Cardiology Fellow: Kilo Emery MD   3. Interventional Cardiology Fellow: Guillermo De La Cruz MD      INDICATION:  Akshat Fragoso is a 51 year old male with no known coronary artery disease. He is admitted with acute decompensated systolic heart failure and has newly diagnosed cardiomyopathy with LVEF 10-15%.     DESCRIPTION:  1. Consent obtained with discussion of risks.  All questions were answered.  2. Sterile prep and procedure.  3. Location: right common femoral artery and right common femoral vein.  4. Access: Local  anesthetic with lidocaine.  A standard (18 g) needle with ultrasound guidance was used to establish vascular access using a modified Seldinger technique.  5. Sheath: 4Fr long sheath, 7Fr standard sheath  6. Catheters: 4Fr 3DRC, 4Fr JL-4, 7Fr Green[place]-Jalen  7. Fluoroscopy time of 8.3 min.  8. Estimated blood loss of <5 mL.  9. See below for procedure details.     MEDICATIONS:  1. Contrast 46 mL IV.  2. Conscious sedation with fentanyl and midazolam as directed by the attending cardiologist.     Conscious sedation:200 Fentanyl, 4 Versed  Total Sedation Time: 44 Minutes  The patient was monitored by the Dept. Nursing staff under my supervision        Right Heart Catheterization:  /77    BSA 1.78     RA 11/8/8   RV 51/15  PA 45/25/34   PCW 25/25/23   Carlos CO 4.84   Carlos CI 2.72   TD CO 6.4   TD CI 3.6   PA sat 70.1%   Hgb 15.3 g/dL   PVR 2.27  TPR 7.03     CORONARY ANGIOGRAM:   1. Both coronary arteries arise from their respective cusps.  2. Left-dominant.  3. LM is without angiographic evidence of disease.   4. LAD supplies the entire apex (type 3) and gives rise to septal perforators, D1 and D2.  The mLAD has mild luminal irregularities.   5. LCX gives rise to large OM1 and small OM2 along with trivial distal OM vessels before supplying LPDA and LPL branches.  There is no angiographic evidence of disease.   6. RCA is nondominant. The pRCA has a <25% stenosis.       COMPLICATIONS:  1. None     SUMMARY:   1. Nonischemic cardiomyopathy.  2. Normal right-sided and increased left-sided filling pressures.  3. Mild secondary pulmonary hypertension with a mean PAP of 34 mmHg.  4. Normal cardiac output of 4.84 L/min and cardiac index of 2.72.  2. No angiographic evidence of obstructive coronary artery disease with minimal nonobstructive disease.  3. Arterial and venous sheaths removed in the cath lab.     PLAN:   1. Aspirin 81 mg po daily lifelong.  2. Management of heart failure per primary team.  3. Bedrest per  protocol.  4. Return to the primary inpatient team for further evaluation and management.  5. Continued medical management and lifestyle modification for cardiovascular risk factor optimization.

## 2017-08-21 ENCOUNTER — OFFICE VISIT (OUTPATIENT)
Dept: FAMILY MEDICINE | Facility: CLINIC | Age: 52
End: 2017-08-21
Payer: COMMERCIAL

## 2017-08-21 VITALS
OXYGEN SATURATION: 98 % | DIASTOLIC BLOOD PRESSURE: 75 MMHG | RESPIRATION RATE: 20 BRPM | TEMPERATURE: 98 F | BODY MASS INDEX: 25.06 KG/M2 | SYSTOLIC BLOOD PRESSURE: 123 MMHG | HEART RATE: 90 BPM | WEIGHT: 160 LBS

## 2017-08-21 DIAGNOSIS — M54.41 ACUTE RIGHT-SIDED LOW BACK PAIN WITH RIGHT-SIDED SCIATICA: Primary | ICD-10-CM

## 2017-08-21 DIAGNOSIS — Z95.810 S/P ICD (INTERNAL CARDIAC DEFIBRILLATOR) PROCEDURE: ICD-10-CM

## 2017-08-21 PROCEDURE — 99213 OFFICE O/P EST LOW 20 MIN: CPT | Performed by: NURSE PRACTITIONER

## 2017-08-21 RX ORDER — OXYCODONE AND ACETAMINOPHEN 5; 325 MG/1; MG/1
1-2 TABLET ORAL 2 TIMES DAILY PRN
Qty: 60 TABLET | Refills: 0 | Status: SHIPPED | OUTPATIENT
Start: 2017-08-21 | End: 2017-09-05

## 2017-08-21 NOTE — PROGRESS NOTES
"  SUBJECTIVE:   Akshat Fragoso is a 52 year old male who presents to clinic today for the following health issues:      Musculoskeletal problem/pain      Duration: two weeks     Description  Two weeks ago Akshat states he fell landing on his tailbone and twisting his low back.  He has had pain and problems with \"Sciatica\" ever since.  He has pain in his right low waist and down the back of his right leg.  Leaving today for a 2 week road trip. He will be a passenger and is not driving at all.  Today, he has pain in his right buttock and down the back of his right leg.  \"I feel that this is my usual sciatica.\"      Intensity:  moderate, severe    Accompanying signs and symptoms: radiation of pain down to foot     History  Previous similar problem: YES  Previous evaluation:  none    Precipitating or alleviating factors:  Trauma or overuse: YES  Aggravating factors include: sitting and laying down     Therapies tried and outcome: heat and ice        Implanted pacemaker/defib/assist:  Placed June 2017.  \"it has shocked me twice.\"  Rib pain now gone.  His last percocet was June.      Problem list and histories reviewed & adjusted, as indicated.  Additional history: as documented    Patient Active Problem List   Diagnosis     CARDIOVASCULAR SCREENING; LDL GOAL LESS THAN 160     Acute right lumbar radiculopathy     Personal history of smoking     CHF (congestive heart failure) (H)     Acute systolic heart failure (H)     Cardiomyopathy, unspecified (H)     Cardiomyopathy, nonischemic (H)     Pulmonary nodules, next CT due 1/2018     Personal history of tobacco use, presenting hazards to health     S/P ICD (internal cardiac defibrillator) procedure     Past Surgical History:   Procedure Laterality Date     None         Social History   Substance Use Topics     Smoking status: Former Smoker     Packs/day: 0.50     Years: 15.00     Types: Cigarettes     Quit date: 12/1/2016     Smokeless tobacco: Former User     Quit date: " 10/24/2011     Alcohol use No     History reviewed. No pertinent family history.      Current Outpatient Prescriptions   Medication Sig Dispense Refill     oxyCODONE-acetaminophen (PERCOCET) 5-325 MG per tablet Take 1-2 tablets by mouth 2 times daily as needed for moderate to severe pain 60 tablet 0     aspirin 81 MG tablet Take 81 mg by mouth daily       lisinopril (PRINIVIL/ZESTRIL) 5 MG tablet Take 1 tablet (5 mg) by mouth 2 times daily 180 tablet 3     spironolactone (ALDACTONE) 25 MG tablet Take 0.5 tablets (12.5 mg) by mouth daily 45 tablet 3     carvedilol (COREG) 6.25 MG tablet Take 1 tablet (6.25 mg) by mouth 2 times daily (with meals) 90 tablet 3     Blood Pressure Monitor KIT 1 kit daily 1 kit 0     furosemide (LASIX) 20 MG tablet Take 1 tablet (20 mg) by mouth daily as needed Only take if weight up by 3lbs 60 tablet 1     Allergies   Allergen Reactions     No Known Allergies      Recent Labs   Lab Test  06/08/17   0739  01/12/17   0655   01/10/17   0718   01/09/17   1001   LDL   --    --    --   64   --    --    HDL   --    --    --   39*   --    --    TRIG   --    --    --   103   --    --    ALT   --    --    --    --    --   111*   CR  0.90  0.85   < >  0.90   < >  0.85   GFRESTIMATED  90  >90  Non  GFR Calc     < >  90   < >  >90  Non  GFR Calc     GFRESTBLACK  >90   GFR Calc    >90   GFR Calc     < >  >90   GFR Calc     < >  >90   GFR Calc     POTASSIUM  4.2  4.2   < >  4.4   --   4.9   TSH   --    --    --    --    --   1.34    < > = values in this interval not displayed.      BP Readings from Last 3 Encounters:   08/21/17 123/75   06/09/17 118/75   05/22/17 122/64    Wt Readings from Last 3 Encounters:   08/21/17 160 lb (72.6 kg)   06/08/17 156 lb 15.5 oz (71.2 kg)   05/22/17 157 lb (71.2 kg)                  Labs reviewed in EPIC          Reviewed and updated as needed this visit by clinical staff      Reviewed and updated as needed this visit by Provider         ROS:  Constitutional, HEENT, cardiovascular, pulmonary, GI, , musculoskeletal, neuro, skin, endocrine and psych systems are negative, except as otherwise noted.      OBJECTIVE:   /75  Pulse 90  Temp 98  F (36.7  C) (Oral)  Resp 20  Wt 160 lb (72.6 kg)  SpO2 98%  BMI 25.06 kg/m2  Body mass index is 25.06 kg/(m^2).  GENERAL APPEARANCE: healthy, alert and no distress. Smiling.   SKIN: warm and dry  MS: ambulatory with an antalgic gait, favoring his right leg.  ROM of low back is globally limited.  PSYCH: mentation appears normal and affect normal/bright.  Good eye contact.      ASSESSMENT/PLAN:     (M54.41) Acute right-sided low back pain with right-sided sciatica  (primary encounter diagnosis)  Comment: =  Plan: oxyCODONE-acetaminophen (PERCOCET) 5-325 MG per        tablet            (Z95.810) S/P ICD (internal cardiac defibrillator) procedure  Comment:   Plan: oxyCODONE-acetaminophen (PERCOCET) 5-325 MG per        tablet          Akshat has a history of multiple percocet prescriptions.  These prescriptions were previously related to his cardiac issues/heart vest.  Since his pacemaker/defibrillator, he has not had narcotics.  I do believe his pain issues today are legitimate.  I did approve a 60 tablet supply of percocet to last him through his 2 week trip and then beyond.   He will continue stretching/exercises.  ER if concerning symptoms.  He will follow up with me otherwise as needed.      VERONICA Doran [facility]

## 2017-08-21 NOTE — MR AVS SNAPSHOT
After Visit Summary   8/21/2017    Akshat Fragoso    MRN: [id number]           Patient Information     Date Of Birth          1965        Visit Information        Provider Department      8/21/2017 2:00 PM Denise White APRN CNP [facility]        Today's Diagnoses     Acute right-sided low back pain with right-sided sciatica    -  1    S/P ICD (internal cardiac defibrillator) procedure           Follow-ups after your visit        Your next 10 appointments already scheduled     Sep 18, 2017 10:00 AM CDT   (Arrive by 9:45 AM)   Implanted Defibulator with Uc Cv Device 1   [facility] ([facility])    [address] [facility]  [address] [facility] [address] [phone]            Sep 18, 2017 10:40 AM CDT   (Arrive by 10:25 AM)   RETURN ARRHYTHMIA with Brynn Moy MD   [facility] ([facility])    [address] [address] [phone]              Who to contact     If you have questions or need follow up information about today's clinic visit or your schedule please contact [facility] directly at [phone].  Normal or non-critical lab and imaging results will be communicated to you by MyChart, letter or phone within 4 business days after the clinic has received the results. If you do not hear from us within 7 days, please contact the clinic through [employer] or phone. If you have a critical or abnormal lab result, we will notify you by phone as soon as possible.  Submit refill requests through [employer] or call your pharmacy and they will forward the refill request to us. Please allow 3 business days for your refill to be completed.          Additional Information About Your Visit        [employer] Information     [employer] gives you secure access to your electronic health record. If you see a primary care provider, you can also send messages to your  care team and make appointments. If you have questions, please call your primary care clinic.  If you do not have a primary care provider, please call [phone] and they will assist you.        Care EveryWhere ID     This is your Care EveryWhere ID. This could be used by other organizations to access your [facility] medical records  [id number]        Your Vitals Were     Pulse Temperature Respirations Pulse Oximetry BMI (Body Mass Index)       90 98  F (36.7  C) (Oral) 20 98% 25.06 kg/m2        Blood Pressure from Last 3 Encounters:   08/21/17 123/75   06/09/17 118/75   05/22/17 122/64    Weight from Last 3 Encounters:   08/21/17 160 lb (72.6 kg)   06/08/17 156 lb 15.5 oz (71.2 kg)   05/22/17 157 lb (71.2 kg)              Today, you had the following     No orders found for display         Today's Medication Changes          These changes are accurate as of: 8/21/17  2:31 PM.  If you have any questions, ask your nurse or doctor.               These medicines have changed or have updated prescriptions.        Dose/Directions    oxyCODONE-acetaminophen 5-325 MG per tablet   Commonly known as:  PERCOCET   This may have changed:  when to take this   Used for:  S/P ICD (internal cardiac defibrillator) procedure, Acute right-sided low back pain with right-sided sciatica   Changed by:  Denise White APRN CNP        Dose:  1-2 tablet   Take 1-2 tablets by mouth 2 times daily as needed for moderate to severe pain   Quantity:  60 tablet   Refills:  0            Where to get your medicines      Some of these will need a paper prescription and others can be bought over the counter.  Ask your nurse if you have questions.     Bring a paper prescription for each of these medications     oxyCODONE-acetaminophen 5-325 MG per tablet                Primary Care Provider Office Phone # Fax #    VERONICA Hallman [phone] [address] [place] [address]        Equal Access  to Services     MAXIMINO TALAMANTES : Kristopher Rice, wadevenda alee, qajuniorsonya kabrookkieran knight. So [facility] [phone].    ATENCIÓN: Si habla ester, tiene a whipple disposición servicios gratuitos de asistencia lingüística. Llame al [phone].    We comply with applicable federal civil rights laws and [place] laws. We do not discriminate on the basis of race, color, national origin, age, disability sex, sexual orientation or gender identity.            Thank you!     Thank you for choosing [facility]  for your care. Our goal is always to provide you with excellent care. Hearing back from our patients is one way we can continue to improve our services. Please take a few minutes to complete the written survey that you may receive in the mail after your visit with us. Thank you!             Your Updated Medication List - Protect others around you: Learn how to safely use, store and throw away your medicines at www.disposemymeds.org.          This list is accurate as of: 8/21/17  2:31 PM.  Always use your most recent med list.                   Brand Name Dispense Instructions for use Diagnosis    aspirin 81 MG tablet      Take 81 mg by mouth daily        Blood Pressure Monitor Kit     1 kit    1 kit daily    Chronic congestive heart failure, unspecified congestive heart failure type (H)       carvedilol 6.25 MG tablet    COREG    90 tablet    Take 1 tablet (6.25 mg) by mouth 2 times daily (with meals)    Chronic congestive heart failure, unspecified congestive heart failure type (H)       furosemide 20 MG tablet    LASIX    60 tablet    Take 1 tablet (20 mg) by mouth daily as needed Only take if weight up by 3lbs    JOHNSON (dyspnea on exertion), Chronic congestive heart failure, unspecified congestive heart failure type (H), Acute on chronic systolic congestive heart failure (H)       lisinopril 5 MG tablet    PRINIVIL/ZESTRIL    180 tablet    Take 1  tablet (5 mg) by mouth 2 times daily    Chronic congestive heart failure, unspecified congestive heart failure type (H)       oxyCODONE-acetaminophen 5-325 MG per tablet    PERCOCET    60 tablet    Take 1-2 tablets by mouth 2 times daily as needed for moderate to severe pain    S/P ICD (internal cardiac defibrillator) procedure, Acute right-sided low back pain with right-sided sciatica       spironolactone 25 MG tablet    ALDACTONE    45 tablet    Take 0.5 tablets (12.5 mg) by mouth daily    Chronic congestive heart failure, unspecified congestive heart failure type (H)

## 2017-09-05 ENCOUNTER — TELEPHONE (OUTPATIENT)
Dept: FAMILY MEDICINE | Facility: CLINIC | Age: 52
End: 2017-09-05

## 2017-09-05 DIAGNOSIS — M54.41 RIGHT-SIDED LOW BACK PAIN WITH RIGHT-SIDED SCIATICA: Primary | ICD-10-CM

## 2017-09-05 DIAGNOSIS — M54.41 ACUTE RIGHT-SIDED LOW BACK PAIN WITH RIGHT-SIDED SCIATICA: ICD-10-CM

## 2017-09-05 DIAGNOSIS — Z95.810 S/P ICD (INTERNAL CARDIAC DEFIBRILLATOR) PROCEDURE: ICD-10-CM

## 2017-09-05 RX ORDER — OXYCODONE AND ACETAMINOPHEN 5; 325 MG/1; MG/1
1-2 TABLET ORAL 2 TIMES DAILY PRN
Qty: 30 TABLET | Refills: 0 | Status: SHIPPED | OUTPATIENT
Start: 2017-09-05 | End: 2017-09-20

## 2017-09-05 RX ORDER — OXYCODONE AND ACETAMINOPHEN 5; 325 MG/1; MG/1
1-2 TABLET ORAL 2 TIMES DAILY PRN
Qty: 60 TABLET | Refills: 0 | Status: CANCELLED | OUTPATIENT
Start: 2017-09-05

## 2017-09-05 NOTE — TELEPHONE ENCOUNTER
CO review:  Asking for Percocet refill.  Given #60 on 8/21. Took car trip to [place] and just returned lots of discomfort in low back and right buttock.     He states he used up to 4 tabs  a day of Percocet. He wants to be notified if he can get a refill.  He requested PT which triage placed an order for SERENA.     Routing refill request to provider for review/approval because:  Drug not on the G refill protocol   Eli Mcnally RN

## 2017-09-05 NOTE — TELEPHONE ENCOUNTER
Reason for Call: Request for an order or referral:    Order or referral being requested: Physical Therapy    Date needed: as soon as possible    Has the patient been seen by the PCP for this problem? YES    Additional comments:     Phone number Patient can be reached at:  Home number on file [phone] (home)    Best Time:      Can we leave a detailed message on this number?  YES    Call taken on 9/5/2017 at 7:09 AM by Jacqueline Marte

## 2017-09-06 NOTE — TELEPHONE ENCOUNTER
His last percocet prescription was to be his last percocet prescription, to last him through his trip and recovery.  I did approve this request and it should be considered his last approved refill.  I have had other discussions with him about the appropriate use of percocet, only for short term/acute need.  No more refills will be approved without a face to face appointment.

## 2017-09-08 NOTE — TELEPHONE ENCOUNTER
Triage LMOM and told him that he would need in person visit for further refills.  This RX has been walked to [facility] pharmacy as per notes.  Eli Mcnally RN

## 2017-09-13 ENCOUNTER — PRE VISIT (OUTPATIENT)
Dept: CARDIOLOGY | Facility: CLINIC | Age: 52
End: 2017-09-13

## 2017-09-13 NOTE — TELEPHONE ENCOUNTER
Patient had procedure in the hospital:    Procedures:  1. Single chamber ICD device implantation.  2. Left Subclavian Venogram    Appointment prior:    Device check( [employer])

## 2017-09-17 ENCOUNTER — PRE VISIT (OUTPATIENT)
Dept: CARDIOLOGY | Facility: CLINIC | Age: 52
End: 2017-09-17

## 2017-09-18 ENCOUNTER — OFFICE VISIT (OUTPATIENT)
Dept: CARDIOLOGY | Facility: CLINIC | Age: 52
End: 2017-09-18
Attending: NURSE PRACTITIONER
Payer: COMMERCIAL

## 2017-09-18 ENCOUNTER — ALLIED HEALTH/NURSE VISIT (OUTPATIENT)
Dept: CARDIOLOGY | Facility: CLINIC | Age: 52
End: 2017-09-18
Attending: INTERNAL MEDICINE
Payer: COMMERCIAL

## 2017-09-18 VITALS
DIASTOLIC BLOOD PRESSURE: 75 MMHG | OXYGEN SATURATION: 98 % | BODY MASS INDEX: 26.56 KG/M2 | HEIGHT: 67 IN | WEIGHT: 169.2 LBS | SYSTOLIC BLOOD PRESSURE: 117 MMHG | HEART RATE: 82 BPM

## 2017-09-18 DIAGNOSIS — I42.9 CARDIOMYOPATHY, UNSPECIFIED (H): Primary | ICD-10-CM

## 2017-09-18 DIAGNOSIS — Z95.810 ICD (IMPLANTABLE CARDIOVERTER-DEFIBRILLATOR), SINGLE, IN SITU: Primary | ICD-10-CM

## 2017-09-18 PROCEDURE — 93282 PRGRMG EVAL IMPLANTABLE DFB: CPT | Mod: ZF

## 2017-09-18 PROCEDURE — 93282 PRGRMG EVAL IMPLANTABLE DFB: CPT | Mod: 26 | Performed by: INTERNAL MEDICINE

## 2017-09-18 PROCEDURE — 99213 OFFICE O/P EST LOW 20 MIN: CPT | Mod: 25 | Performed by: NURSE PRACTITIONER

## 2017-09-18 PROCEDURE — 99212 OFFICE O/P EST SF 10 MIN: CPT | Mod: ZF

## 2017-09-18 ASSESSMENT — PAIN SCALES - GENERAL: PAINLEVEL: NO PAIN (0)

## 2017-09-18 NOTE — PATIENT INSTRUCTIONS
Cardiology Provider you saw in clinic today: VERONICA Jain CNP    Medication Changes:      Labs/Tests needed:       Follow-up Visit:  With Dr. Cardona in 6 weeks    Further Instructions:      You will receive all normal lab and testing results via [employer] or mail if not reviewed in clinic today. Please contact our office if you need assistance with setting up MyChart.    If you need a medication refill please contact your pharmacy. Please allow 3 business days for your refill to be completed.     As always, thank you for trusting us with your health care needs!    If you have any questions regarding your visit please contact your care team:   Cardiology  Telephone Number    EP RN  Electrophysiology Nurse Coordinator [phone]     Call for EP procedure scheduling concerns  LARISSA Casas  [phone]           Device Clinic (Pacemakers, ICDs, Loop)   RN's : Yumiko Morris Connie, Dawn During business hours: [phone]    After business hours:   [phone]- select option 4 and ask for job code [phone].

## 2017-09-18 NOTE — MR AVS SNAPSHOT
After Visit Summary   9/18/2017    Akshat Fragoso    MRN: [id number]           Patient Information     Date Of Birth          1965        Visit Information        Provider Department      9/18/2017 11:00 AM 1, U[facility] Cv Device [facility]        Today's Diagnoses     Cardiomyopathy, unspecified (H)    -  1       Follow-ups after your visit        Your next 10 appointments already scheduled     Sep 18, 2017 11:30 AM CDT   (Arrive by 11:15 AM)   RETURN ARRHYTHMIA with VERONICA Jarrell [facility] ([facility] [place])    [address] [id number] [phone]            Sep 18, 2017  4:50 PM CDT   (Arrive by 4:35 PM)   SERENA Spine with Ron Stevenson PT   [facility] Physical Therapy ([facility]  )    [address] [place] [address] [phone]            Sep 27, 2017  4:40 PM CDT   SERENA Spine with Coleen Schwartz PT   [facility] Physical Therapy ([facility]  )    [address] [place] [address] [phone]              Who to contact     If you have questions or need follow up information about today's clinic visit or your schedule please contact [facility] directly at [phone].  Normal or non-critical lab and imaging results will be communicated to you by Innov [employer] Systemshart, letter or phone within 4 business days after the clinic has received the results. If you do not hear from us within 7 days, please contact the clinic through [employer] Systemshart or phone. If you have a critical or abnormal lab result, we will notify you by phone as soon as possible.  Submit refill requests through 365looks or call your pharmacy and they will forward the refill request to us. Please allow 3 business days for your refill to be completed.          Additional Information About Your Visit        365looks Information     365looks gives you secure  access to your electronic health record. If you see a primary care provider, you can also send messages to your care team and make appointments. If you have questions, please call your primary care clinic.  If you do not have a primary care provider, please call [phone] and they will assist you.        Care EveryWhere ID     This is your Care EveryWhere ID. This could be used by other organizations to access your [facility] medical records  [id number]         Blood Pressure from Last 3 Encounters:   08/21/17 123/75   06/09/17 118/75   05/22/17 122/64    Weight from Last 3 Encounters:   08/21/17 72.6 kg (160 lb)   06/08/17 71.2 kg (156 lb 15.5 oz)   05/22/17 71.2 kg (157 lb)              We Performed the Following     ICD DEVICE PROGRAMMING EVAL, SINGLE LEAD ICD        Primary Care Provider Office Phone # Fax #    Denise HICKS VERONICA White [facility] [phone] [address] [place] [address]        Equal Access to Services     [facility]: Hadii aad ku hadasho Soomaali, waaxda luqadaha, qaybta kaalmada adeegyada, waxay idiin haysonido sanchez . So [facility] [phone].    ATENCIÓN: Si habla español, tiene a whipple disposición servicios gratuitos de asistencia lingüística. Llame al [phone].    We comply with applicable federal civil rights laws and [place] laws. We do not discriminate on the basis of race, color, national origin, age, disability sex, sexual orientation or gender identity.            Thank you!     Thank you for choosing [facility]  for your care. Our goal is always to provide you with excellent care. Hearing back from our patients is one way we can continue to improve our services. Please take a few minutes to complete the written survey that you may receive in the mail after your visit with us. Thank you!             Your Updated Medication List - Protect others around you: Learn how to safely use, store and throw away your medicines at  www.disposemymeds.org.          This list is accurate as of: 9/18/17 11:20 AM.  Always use your most recent med list.                   Brand Name Dispense Instructions for use Diagnosis    aspirin 81 MG tablet      Take 81 mg by mouth daily        Blood Pressure Monitor Kit     1 kit    1 kit daily    Chronic congestive heart failure, unspecified congestive heart failure type (H)       carvedilol 6.25 MG tablet    COREG    90 tablet    Take 1 tablet (6.25 mg) by mouth 2 times daily (with meals)    Chronic congestive heart failure, unspecified congestive heart failure type (H)       furosemide 20 MG tablet    LASIX    60 tablet    Take 1 tablet (20 mg) by mouth daily as needed Only take if weight up by 3lbs    JOHNSON (dyspnea on exertion), Chronic congestive heart failure, unspecified congestive heart failure type (H), Acute on chronic systolic congestive heart failure (H)       lisinopril 5 MG tablet    PRINIVIL/ZESTRIL    180 tablet    Take 1 tablet (5 mg) by mouth 2 times daily    Chronic congestive heart failure, unspecified congestive heart failure type (H)       oxyCODONE-acetaminophen 5-325 MG per tablet    PERCOCET    30 tablet    Take 1-2 tablets by mouth 2 times daily as needed for moderate to severe pain . Appointment required for additional refills.    S/P ICD (internal cardiac defibrillator) procedure, Acute right-sided low back pain with right-sided sciatica       spironolactone 25 MG tablet    ALDACTONE    45 tablet    Take 0.5 tablets (12.5 mg) by mouth daily    Chronic congestive heart failure, unspecified congestive heart failure type (H)

## 2017-09-18 NOTE — PROGRESS NOTES
Patient seen in clinic for evaluation and iterative programming of his [employer] single lead ICD per MD orders.  Patient has an appointment to see Trisha Lira NP today.  Normal ICD function.  1 VT episode recorded on 8/14/17 - 6 sec, 189 bpm.  3 episodes recorded in the VT monitor zone that appear to be ST-SVT - 170-171 bpm, 29 sec - 1 min 7 sec in duration.  Intrinsic rhythm = VS @ 78 bpm.   = <1%.  Estimated battery longevity to NEETA = 12 years.  Patient reports that he is feeling well and denies complaints.  Plan for patient to send a remote transmission in 3 months and return to clinic in 6 months.  Trisha Lira NP notified of interrogation results.    Single lead ICD

## 2017-09-18 NOTE — MR AVS SNAPSHOT
After Visit Summary   9/18/2017    Akshat Fragoso    MRN: [id number]           Patient Information     Date Of Birth          1965        Visit Information        Provider Department      9/18/2017 11:30 AM Trisha Lira APRN CNP [facility]        Care Instructions    Cardiology Provider you saw in clinic today: VERONICA Jain CNP    Medication Changes:      Labs/Tests needed:       Follow-up Visit:  With Dr. Cardona in 6 weeks    Further Instructions:      You will receive all normal lab and testing results via MyChart or mail if not reviewed in clinic today. Please contact our office if you need assistance with setting up MyChart.    If you need a medication refill please contact your pharmacy. Please allow 3 business days for your refill to be completed.     As always, thank you for trusting us with your health care needs!    If you have any questions regarding your visit please contact your care team:   Cardiology  Telephone Number    EP RN  Electrophysiology Nurse Coordinator [phone]     Call for EP procedure scheduling concerns  LARISSA Casas  [phone]           Device Clinic (Pacemakers, ICDs, Loop)   RN's : Yumiko Morris Connie, Dawn During business hours: [phone]    After business hours:   [phone]- select option 4 and ask for job code [id number].                  Follow-ups after your visit        Follow-up notes from your care team     Return in about 6 weeks (around 10/30/2017).      Your next 10 appointments already scheduled     Sep 18, 2017  4:50 PM CDT   (Arrive by 4:35 PM)   SERENA Spine with Ron Stevenson PT   [facility] Therapy ([facility]  )    [address] [place] [address]   [phone]            Sep 27, 2017  4:40 PM CDT   SERENA Spine with Coleen Schwartz PT   [facility] Therapy ([facility]  )    [address] [place] [address]  "[facility] [address]   [phone]            Oct 27, 2017  2:00 PM CDT   (Arrive by 1:45 PM)   Return Visit with Channing Cardona MD   [facility] ([facility] [place])    [address] [facility]  3rd [facility] [id number] [phone]              Who to contact     If you have questions or need follow up information about today's clinic visit or your schedule please contact [facility] directly at [phone].  Normal or non-critical lab and imaging results will be communicated to you by Coherent Pathhart, letter or phone within 4 business days after the clinic has received the results. If you do not hear from us within 7 days, please contact the clinic through [employer] or phone. If you have a critical or abnormal lab result, we will notify you by phone as soon as possible.  Submit refill requests through iRewind or call your pharmacy and they will forward the refill request to us. Please allow 3 business days for your refill to be completed.          Additional Information About Your Visit        Coherent PathharBucky Box Information     iRewind gives you secure access to your electronic health record. If you see a primary care provider, you can also send messages to your care team and make appointments. If you have questions, please call your primary care clinic.  If you do not have a primary care provider, please call [phone] and they will assist you.        Care EveryWhere ID     This is your Care EveryWhere ID. This could be used by other organizations to access your [facility] medical records  [id number]        Your Vitals Were     Pulse Height Pulse Oximetry BMI (Body Mass Index)          82 1.702 m (5' 7\") 98% 26.5 kg/m2         Blood Pressure from Last 3 Encounters:   09/18/17 117/75   08/21/17 123/75   06/09/17 118/75    Weight from Last 3 Encounters:   09/18/17 76.7 kg (169 lb 3.2 oz)   08/21/17 72.6 kg (160 lb)   06/08/17 71.2 kg (156 lb 15.5 oz)              Today, " you had the following     No orders found for display       Primary Care Provider Office Phone # Fax #    Denise White, VERONICA [facility] [phone] [address] [place] [address]        Equal Access to Services     [facility]IM[facility] TALAMANTES : Hadii aad ku hadrituo Soomaali, waaxda luqadaha, qaybta kaalmada adeegyada, waxay idiin hayaan adeaustin khwongsh daniel . So [facility] [phone].    ATENCIÓN: Si habla español, tiene a whipple disposición servicios gratuitos de asistencia lingüística. Llame al [phone].    We comply with applicable federal civil rights laws and [place] laws. We do not discriminate on the basis of race, color, national origin, age, disability sex, sexual orientation or gender identity.            Thank you!     Thank you for choosing [facility]  for your care. Our goal is always to provide you with excellent care. Hearing back from our patients is one way we can continue to improve our services. Please take a few minutes to complete the written survey that you may receive in the mail after your visit with us. Thank you!             Your Updated Medication List - Protect others around you: Learn how to safely use, store and throw away your medicines at www.disposemymeds.org.          This list is accurate as of: 9/18/17 11:39 AM.  Always use your most recent med list.                   Brand Name Dispense Instructions for use Diagnosis    aspirin 81 MG tablet      Take 81 mg by mouth daily        Blood Pressure Monitor Kit     1 kit    1 kit daily    Chronic congestive heart failure, unspecified congestive heart failure type (H)       carvedilol 6.25 MG tablet    COREG    90 tablet    Take 1 tablet (6.25 mg) by mouth 2 times daily (with meals)    Chronic congestive heart failure, unspecified congestive heart failure type (H)       furosemide 20 MG tablet    LASIX    60 tablet    Take 1 tablet (20 mg) by mouth daily as needed Only take if weight up by 3lbs    JOHNSON  (dyspnea on exertion), Chronic congestive heart failure, unspecified congestive heart failure type (H), Acute on chronic systolic congestive heart failure (H)       lisinopril 5 MG tablet    PRINIVIL/ZESTRIL    180 tablet    Take 1 tablet (5 mg) by mouth 2 times daily    Chronic congestive heart failure, unspecified congestive heart failure type (H)       oxyCODONE-acetaminophen 5-325 MG per tablet    PERCOCET    30 tablet    Take 1-2 tablets by mouth 2 times daily as needed for moderate to severe pain . Appointment required for additional refills.    S/P ICD (internal cardiac defibrillator) procedure, Acute right-sided low back pain with right-sided sciatica       spironolactone 25 MG tablet    ALDACTONE    45 tablet    Take 0.5 tablets (12.5 mg) by mouth daily    Chronic congestive heart failure, unspecified congestive heart failure type (H)

## 2017-09-18 NOTE — NURSING NOTE
Chief Complaint   Patient presents with     Follow Up For     device prior     Vitals were taken and medications were reconciled.  GARY Fisher  11:02 AM

## 2017-09-18 NOTE — LETTER
"9/18/2017      RE: Akshat Fragoso  [address]  [facility] [address]       Dear Colleague,    Thank you for the opportunity to participate in the care of your patient, Akshat Fragoso, at the [facility] HEART [facility]. Please see a copy of my visit note below.      Clinical Cardiac Electrophysiology        HPI:   Follow up for a ICD implantation    Akshat Fragoso is a 52 year old male with a past medical history of NICM, NYHA class II, LVEF 15% and tobacco use.  He had a single lead [employer] ICD implanted on 6/8/2017 for primary prevention of sudden cardiac death.      9/18/2019  Today he presents after device implantation.  He has no complaints.  Denies headaches, dizziness, syncope, angina, dyspnea at rest or with exertion, palpitations, orthopnea, PND, abdominal pain, pedal edema, claudication, or any new numbness/weakness, hematuria, hematochezia, and epistaxis.    Current cardiac medications include aspirin 81 mg, lisinopril 5 mg, spironolactione 12.5 mg, carvedil 3.125 mg daily, furosemide 20 mg daily.      Device check per device RN \"1 VT episode recorded on 8/14/17 - 6 sec, 189 bpm.  3 episodes recorded in the VT monitor zone that appear to be ST-SVT - 170-171 bpm, 29 sec - 1 min 7 sec in duration.  Intrinsic rhythm = VS @ 78 bpm.   = <1%.  Estimated battery longevity to NEETA = 12 years\"    PAST MEDICAL HISTORY:  Past Medical History:   Diagnosis Date     CARDIOVASCULAR SCREENING; LDL GOAL LESS THAN 160 5/9/2010     Pneumonia 11/2011       CURRENT MEDICATIONS:  Current Outpatient Prescriptions   Medication Sig Dispense Refill     oxyCODONE-acetaminophen (PERCOCET) 5-325 MG per tablet Take 1-2 tablets by mouth 2 times daily as needed for moderate to severe pain . Appointment required for additional refills. 30 tablet 0     aspirin 81 MG tablet Take 81 mg by mouth daily       lisinopril (PRINIVIL/ZESTRIL) 5 MG tablet Take 1 tablet (5 mg) by mouth 2 " "times daily 180 tablet 3     spironolactone (ALDACTONE) 25 MG tablet Take 0.5 tablets (12.5 mg) by mouth daily 45 tablet 3     carvedilol (COREG) 6.25 MG tablet Take 1 tablet (6.25 mg) by mouth 2 times daily (with meals) 90 tablet 3     Blood Pressure Monitor KIT 1 kit daily 1 kit 0     furosemide (LASIX) 20 MG tablet Take 1 tablet (20 mg) by mouth daily as needed Only take if weight up by 3lbs 60 tablet 1       PAST SURGICAL HISTORY:  Past Surgical History:   Procedure Laterality Date     None         ALLERGIES:     Allergies   Allergen Reactions     No Known Allergies        FAMILY HISTORY:  No family history on file.    SOCIAL HISTORY:  Social History   Substance Use Topics     Smoking status: Former Smoker     Packs/day: 0.50     Years: 15.00     Types: Cigarettes     Quit date: 12/1/2016     Smokeless tobacco: Former User     Quit date: 10/24/2011     Alcohol use No       ROS:   CONSTITUTIONAL:No report of fever, chills, or change in weight  RESPIRATORY: No cough, wheezing, SOB, or hemoptysis  CARDIOVASCULAR: see HPI  MUSCULO-SKELETAL: No joint pain/swelling, no muscle pain  NEURO: No paresthesias, syncope, pre-syncope, light headness, dizziness or vertigo  ENDOCRINE: No temperature intolerance, no skin/hair changes  PSYCHIATRIC: No change in mood, feeling down/anxious, no change in sleep or appetite  GI: no melena or hematochezia, no change in bowel habits  : no hematuria or dysurea, no hesitancy, dribbling or incontinence  HEME: no easy bruising or bleeding, no history of anemia, no history of blood clots  SKIN: no rashes or sores, no unusual itching      Exam:  /75 (BP Location: Left arm, Patient Position: Chair, Cuff Size: Adult Regular)  Pulse 82  Ht 1.702 m (5' 7\")  Wt 76.7 kg (169 lb 3.2 oz)  SpO2 98%  BMI 26.5 kg/m2  GENERAL APPEARANCE: thin male, alert and no distress  HEENT: no icterus, no xanthelasmas, normal pupil size and reaction, normal palate, mucosa moist, no central " cyanosis  NECK: no adenopathy, no asymmetry, masses, or scars, no bruits, JVP not elevated  RESPIRATORY: fine crackles at bases otherwise clear lungs to auscultation - no rales, rhonchi or wheezes, no use of accessory muscles, no retractions, respirations are unlabored, normal respiratory rate  CARDIOVASCULAR: regular rhythm, normal S1 with physiologic split S2, no S3 or S4 and no murmur, click or rub, precordium quiet with normal PMI.     ABDOMEN: soft, non tender; normal bowel sounds  EXTREMITIES: peripheral pulses normal, no edema  NEURO: alert and oriented to person/place/time, normal speech, gait and affect  VASC: Radial,  dorsalis pedis and posterior tibialis pulses are normal in volumes and symmetric bilaterally.   SKIN: no ecchymoses, no rashes,.  Healing incision in upper left chest     Labs:  CBC RESULTS:   Lab Results   Component Value Date    WBC 14.9 (H) 2017    RBC 4.88 2017    HGB 15.6 2017    HCT 47.0 2017    MCV 96 2017    MCH 32.0 2017    MCHC 33.2 2017    RDW 13.9 2017     2017       BMP RESULTS:  Lab Results   Component Value Date     2017    POTASSIUM 4.2 2017    CHLORIDE 104 2017    CO2 27 2017    ANIONGAP 7 2017     (H) 2017    BUN 24 2017    CR 0.90 2017    GFRESTIMATED 90 2017    GFRESTBLACK >90   GFR Calc   2017    TONY 9.0 2017        INR RESULTS:  Lab Results   Component Value Date    INR 1.12 2017    INR 1.09 2017    INR 1.16 (H) 01/10/2017    INR 1.13 2017       Procedures:  Echocardiogram:   Recent Results (from the past 8760 hour(s))   Echocardiogram Complete    Narrative    Interpretation Summary                       [facility]  Echocardiography Laboratory  [address] [place] [address]     Name: CRISTOBAL THOMAS  MRN: [id number]  : 1965  Study Date: 2017  10:49 AM  Age: 51 yrs  Gender: Male  Patient Location: [facility]  Reason For Study: Dyspnea  Ordering Physician: ALFA FISH  Performed By: Ron Wynn RDCS     BSA: 1.8 m2  Height: 67 in  Weight: 160 lb  BP: 106/77 mmHg  ______________________________________________________________________________        Procedure  Echocardiogram with two-dimensional, color and spectral Doppler performed.  ______________________________________________________________________________     Interpretation Summary  Severe left ventricular dilation is present. Severe diffuse hypokinesis is  present. The Ejection Fraction is estimated at 10-15%. Traced at 13%.  Mild right ventricular dilation is present. Global right ventricular function  is mildly reduced.  Left ventricular diastolic function is indeterminate.  Dilation of the inferior vena cava is present with abnormal respiratory  variation in diameter.  ______________________________________________________________________________           Left Ventricle  Severe left ventricular dilation is present. Severely (EF <30%) reduced left  ventricular function is present. The Ejection Fraction is estimated at 10-  15%. Left ventricular diastolic function is indeterminate. Severe diffuse  hypokinesis is present.     Right Ventricle  Mild right ventricular dilation is present. Global right ventricular function  is mildly reduced.  Atria  Moderate left atrial enlargement is present. Mild right atrial enlargement is  present.     Mitral Valve  Mild mitral annular calcification is present. Trace to mild mitral  insufficiency is present.     Aortic Valve  Aortic valve is normal in structure and function.     Tricuspid Valve  The tricuspid valve is normal. The right ventricular systolic pressure is  approximated at 19.4 mmHg plus the right atrial pressure. Trace tricuspid  insufficiency is present.     Pulmonic Valve  The pulmonic valve is normal.     Vessels  The thoracic aorta is normal.  Dilation of the inferior vena cava is present  with abnormal respiratory variation in diameter. Estimated right atrial  pressure is 10-15 mmHg.  Pericardium  Trivial pericardial effusion is present.     Compared to Previous Study  Previous study not available for comparison.     ______________________________________________________________________________  MMode/2D Measurements & Calculations  IVSd: 0.95 cm  LVIDd: 7.2 cm  LVIDs: 6.7 cm  LVPWd: 0.67 cm  FS: 6.9 %  EDV(Teich): 274.3 ml  ESV(Teich): 233.4 ml  LV mass(C)d: 263.3 grams  Ao root diam: 3.3 cm  asc Aorta Diam: 3.1 cm  LVOT diam: 2.1 cm  LVOT area: 3.6 cm2  LA Volume (BP): 82.0 ml     LA Volume Index (BP): 44.6 ml/m2        Doppler Measurements & Calculations  MV E max jesús: 43.9 cm/sec  MV A max jesús: 19.7 cm/sec  MV E/A: 2.2  MV dec time: 0.29 sec  PA acc time: 0.09 sec  TR max jesús: 220.4 cm/sec  TR max P.4 mmHg  Lateral E/e': 7.9  Medial E/e': 10.9              ______________________________________________________________________________        Report approved by: Leonel Randle 2017 11:56 AM          Assessment and Plan:     ICD  - normal function      NICM  - denies weight changes, SOB, and LE edema.   - is currently taking 3.125 mg coreg BID.   He is suppose to uptitrate this per Dr. Cardona's last note.  He states he will start and then follow up with Dr. Cardona in 6 weeks.      Follow up with Dr. Cardona.     VERONICA Jain, CNP      CC  JEANETTE BEDOLLA

## 2017-09-18 NOTE — PROGRESS NOTES
"      Clinical Cardiac Electrophysiology        HPI:   Follow up for a ICD implantation    Akshat Fragoso is a 52 year old male with a past medical history of NICM, NYHA class II, LVEF 15% and tobacco use.  He had a single lead [employer] ICD implanted on 6/8/2017 for primary prevention of sudden cardiac death.      9/18/2019  Today he presents after device implantation.  He has no complaints.  Denies headaches, dizziness, syncope, angina, dyspnea at rest or with exertion, palpitations, orthopnea, PND, abdominal pain, pedal edema, claudication, or any new numbness/weakness, hematuria, hematochezia, and epistaxis.    Current cardiac medications include aspirin 81 mg, lisinopril 5 mg, spironolactione 12.5 mg, carvedil 3.125 mg daily, furosemide 20 mg daily.      Device check per device RN \"1 VT episode recorded on 8/14/17 - 6 sec, 189 bpm.  3 episodes recorded in the VT monitor zone that appear to be ST-SVT - 170-171 bpm, 29 sec - 1 min 7 sec in duration.  Intrinsic rhythm = VS @ 78 bpm.   = <1%.  Estimated battery longevity to NEETA = 12 years\"    PAST MEDICAL HISTORY:  Past Medical History:   Diagnosis Date     CARDIOVASCULAR SCREENING; LDL GOAL LESS THAN 160 5/9/2010     Pneumonia 11/2011       CURRENT MEDICATIONS:  Current Outpatient Prescriptions   Medication Sig Dispense Refill     oxyCODONE-acetaminophen (PERCOCET) 5-325 MG per tablet Take 1-2 tablets by mouth 2 times daily as needed for moderate to severe pain . Appointment required for additional refills. 30 tablet 0     aspirin 81 MG tablet Take 81 mg by mouth daily       lisinopril (PRINIVIL/ZESTRIL) 5 MG tablet Take 1 tablet (5 mg) by mouth 2 times daily 180 tablet 3     spironolactone (ALDACTONE) 25 MG tablet Take 0.5 tablets (12.5 mg) by mouth daily 45 tablet 3     carvedilol (COREG) 6.25 MG tablet Take 1 tablet (6.25 mg) by mouth 2 times daily (with meals) 90 tablet 3     Blood Pressure Monitor KIT 1 kit daily 1 kit 0     furosemide (LASIX) 20 MG " "tablet Take 1 tablet (20 mg) by mouth daily as needed Only take if weight up by 3lbs 60 tablet 1       PAST SURGICAL HISTORY:  Past Surgical History:   Procedure Laterality Date     None         ALLERGIES:     Allergies   Allergen Reactions     No Known Allergies        FAMILY HISTORY:  No family history on file.    SOCIAL HISTORY:  Social History   Substance Use Topics     Smoking status: Former Smoker     Packs/day: 0.50     Years: 15.00     Types: Cigarettes     Quit date: 12/1/2016     Smokeless tobacco: Former User     Quit date: 10/24/2011     Alcohol use No       ROS:   CONSTITUTIONAL:No report of fever, chills, or change in weight  RESPIRATORY: No cough, wheezing, SOB, or hemoptysis  CARDIOVASCULAR: see HPI  MUSCULO-SKELETAL: No joint pain/swelling, no muscle pain  NEURO: No paresthesias, syncope, pre-syncope, light headness, dizziness or vertigo  ENDOCRINE: No temperature intolerance, no skin/hair changes  PSYCHIATRIC: No change in mood, feeling down/anxious, no change in sleep or appetite  GI: no melena or hematochezia, no change in bowel habits  : no hematuria or dysurea, no hesitancy, dribbling or incontinence  HEME: no easy bruising or bleeding, no history of anemia, no history of blood clots  SKIN: no rashes or sores, no unusual itching      Exam:  /75 (BP Location: Left arm, Patient Position: Chair, Cuff Size: Adult Regular)  Pulse 82  Ht 1.702 m (5' 7\")  Wt 76.7 kg (169 lb 3.2 oz)  SpO2 98%  BMI 26.5 kg/m2  GENERAL APPEARANCE: thin male, alert and no distress  HEENT: no icterus, no xanthelasmas, normal pupil size and reaction, normal palate, mucosa moist, no central cyanosis  NECK: no adenopathy, no asymmetry, masses, or scars, no bruits, JVP not elevated  RESPIRATORY: fine crackles at bases otherwise clear lungs to auscultation - no rales, rhonchi or wheezes, no use of accessory muscles, no retractions, respirations are unlabored, normal respiratory rate  CARDIOVASCULAR: regular rhythm, " normal S1 with physiologic split S2, no S3 or S4 and no murmur, click or rub, precordium quiet with normal PMI.     ABDOMEN: soft, non tender; normal bowel sounds  EXTREMITIES: peripheral pulses normal, no edema  NEURO: alert and oriented to person/place/time, normal speech, gait and affect  VASC: Radial,  dorsalis pedis and posterior tibialis pulses are normal in volumes and symmetric bilaterally.   SKIN: no ecchymoses, no rashes,.  Healing incision in upper left chest     Labs:  CBC RESULTS:   Lab Results   Component Value Date    WBC 14.9 (H) 2017    RBC 4.88 2017    HGB 15.6 2017    HCT 47.0 2017    MCV 96 2017    MCH 32.0 2017    MCHC 33.2 2017    RDW 13.9 2017     2017       BMP RESULTS:  Lab Results   Component Value Date     2017    POTASSIUM 4.2 2017    CHLORIDE 104 2017    CO2 27 2017    ANIONGAP 7 2017     (H) 2017    BUN 24 2017    CR 0.90 2017    GFRESTIMATED 90 2017    GFRESTBLACK >90   GFR Calc   2017    TONY 9.0 2017        INR RESULTS:  Lab Results   Component Value Date    INR 1.12 2017    INR 1.09 2017    INR 1.16 (H) 01/10/2017    INR 1.13 2017       Procedures:  Echocardiogram:   Recent Results (from the past 8760 hour(s))   Echocardiogram Complete    Narrative    Interpretation Summary                       [facility],[place]  Echocardiography Laboratory  [address] [id number]     Name: CRISTOBAL THOMAS  MRN: [id number]  : 1965  Study Date: 2017 10:49 AM  Age: 51 yrs  Gender: Male  Patient Location: [facility]  Reason For Study: Dyspnea  Ordering Physician: ALFA FISH  Performed By: Ron Wynn RDCS     BSA: 1.8 m2  Height: 67 in  Weight: 160 lb  BP: 106/77 mmHg  ______________________________________________________________________________         Procedure  Echocardiogram with two-dimensional, color and spectral Doppler performed.  ______________________________________________________________________________     Interpretation Summary  Severe left ventricular dilation is present. Severe diffuse hypokinesis is  present. The Ejection Fraction is estimated at 10-15%. Traced at 13%.  Mild right ventricular dilation is present. Global right ventricular function  is mildly reduced.  Left ventricular diastolic function is indeterminate.  Dilation of the inferior vena cava is present with abnormal respiratory  variation in diameter.  ______________________________________________________________________________           Left Ventricle  Severe left ventricular dilation is present. Severely (EF <30%) reduced left  ventricular function is present. The Ejection Fraction is estimated at 10-  15%. Left ventricular diastolic function is indeterminate. Severe diffuse  hypokinesis is present.     Right Ventricle  Mild right ventricular dilation is present. Global right ventricular function  is mildly reduced.  Atria  Moderate left atrial enlargement is present. Mild right atrial enlargement is  present.     Mitral Valve  Mild mitral annular calcification is present. Trace to mild mitral  insufficiency is present.     Aortic Valve  Aortic valve is normal in structure and function.     Tricuspid Valve  The tricuspid valve is normal. The right ventricular systolic pressure is  approximated at 19.4 mmHg plus the right atrial pressure. Trace tricuspid  insufficiency is present.     Pulmonic Valve  The pulmonic valve is normal.     Vessels  The thoracic aorta is normal. Dilation of the inferior vena cava is present  with abnormal respiratory variation in diameter. Estimated right atrial  pressure is 10-15 mmHg.  Pericardium  Trivial pericardial effusion is present.     Compared to Previous Study  Previous study not available for comparison.      ______________________________________________________________________________  MMode/2D Measurements & Calculations  IVSd: 0.95 cm  LVIDd: 7.2 cm  LVIDs: 6.7 cm  LVPWd: 0.67 cm  FS: 6.9 %  EDV(Teich): 274.3 ml  ESV(Teich): 233.4 ml  LV mass(C)d: 263.3 grams  Ao root diam: 3.3 cm  asc Aorta Diam: 3.1 cm  LVOT diam: 2.1 cm  LVOT area: 3.6 cm2  LA Volume (BP): 82.0 ml     LA Volume Index (BP): 44.6 ml/m2        Doppler Measurements & Calculations  MV E max jesús: 43.9 cm/sec  MV A max jesús: 19.7 cm/sec  MV E/A: 2.2  MV dec time: 0.29 sec  PA acc time: 0.09 sec  TR max jesús: 220.4 cm/sec  TR max P.4 mmHg  Lateral E/e': 7.9  Medial E/e': 10.9              ______________________________________________________________________________        Report approved by: Leonel Randle 2017 11:56 AM          Assessment and Plan:     ICD  - normal function      NICM  - denies weight changes, SOB, and LE edema.   - is currently taking 3.125 mg coreg BID.   He is suppose to uptitrate this per Dr. Cardona's last note.  He states he will start and then follow up with Dr. Cardona in 6 weeks.      Follow up with Dr. Cardona.     Trisha Lira, APRN, CNP      CC  CHIOMA, JEANETTE CASTILLO

## 2017-09-20 ENCOUNTER — OFFICE VISIT (OUTPATIENT)
Dept: FAMILY MEDICINE | Facility: CLINIC | Age: 52
End: 2017-09-20
Payer: COMMERCIAL

## 2017-09-20 VITALS
OXYGEN SATURATION: 98 % | DIASTOLIC BLOOD PRESSURE: 80 MMHG | HEIGHT: 67 IN | HEART RATE: 87 BPM | TEMPERATURE: 98.4 F | RESPIRATION RATE: 20 BRPM | SYSTOLIC BLOOD PRESSURE: 126 MMHG | WEIGHT: 168 LBS | BODY MASS INDEX: 26.37 KG/M2

## 2017-09-20 DIAGNOSIS — M54.41 ACUTE RIGHT-SIDED LOW BACK PAIN WITH RIGHT-SIDED SCIATICA: Primary | ICD-10-CM

## 2017-09-20 DIAGNOSIS — I50.22 CHRONIC SYSTOLIC CONGESTIVE HEART FAILURE (H): ICD-10-CM

## 2017-09-20 PROCEDURE — 99213 OFFICE O/P EST LOW 20 MIN: CPT | Performed by: NURSE PRACTITIONER

## 2017-09-20 RX ORDER — OXYCODONE AND ACETAMINOPHEN 5; 325 MG/1; MG/1
1 TABLET ORAL 2 TIMES DAILY PRN
Qty: 30 TABLET | Refills: 0 | Status: SHIPPED | OUTPATIENT
Start: 2017-10-04 | End: 2017-11-21

## 2017-09-20 RX ORDER — OXYCODONE AND ACETAMINOPHEN 5; 325 MG/1; MG/1
1 TABLET ORAL 2 TIMES DAILY PRN
Qty: 30 TABLET | Refills: 0 | Status: SHIPPED | OUTPATIENT
Start: 2017-09-20 | End: 2017-09-20 | Stop reason: DRUGHIGH

## 2017-09-20 RX ORDER — OXYCODONE AND ACETAMINOPHEN 5; 325 MG/1; MG/1
TABLET ORAL
Qty: 60 TABLET | Refills: 0 | Status: SHIPPED | OUTPATIENT
Start: 2017-09-20 | End: 2017-09-20

## 2017-09-20 RX ORDER — OXYCODONE AND ACETAMINOPHEN 5; 325 MG/1; MG/1
TABLET ORAL
Qty: 60 TABLET | Refills: 0 | Status: SHIPPED | OUTPATIENT
Start: 2017-09-20 | End: 2017-11-21

## 2017-09-20 NOTE — MR AVS SNAPSHOT
After Visit Summary   9/20/2017    Akshat Fragoso    MRN: [id number]           Patient Information     Date Of Birth          1965        Visit Information        Provider Department      9/20/2017 7:40 AM Denise White APRN CNP [facility]        Today's Diagnoses     Acute right-sided low back pain with right-sided sciatica    -  1    Chronic systolic congestive heart failure (H)           Follow-ups after your visit        Your next 10 appointments already scheduled     Sep 27, 2017  4:40 PM CDT   (Arrive by 4:25 PM)   [facility] with Coleen Schwartz PT   [facility]    [address] [address] [phone]            Oct 27, 2017  2:00 PM CDT   (Arrive by 1:45 PM)   Return Visit with Channing Cardona MD   [facility] ([facility])    [address] [place] [address] [phone]              Who to contact     If you have questions or need follow up information about today's clinic visit or your schedule please contact [facility] directly at [phone].  Normal or non-critical lab and imaging results will be communicated to you by [employer], letter or phone within 4 business days after the clinic has received the results. If you do not hear from us within 7 days, please contact the clinic through [employer] or phone. If you have a critical or abnormal lab result, we will notify you by phone as soon as possible.  Submit refill requests through [employer] or call your pharmacy and they will forward the refill request to us. Please allow 3 business days for your refill to be completed.          Additional Information About Your Visit        MyChart Information     [employer] gives you secure access to your electronic health record. If you see a primary care provider, you can also send messages to your care  "team and make appointments. If you have questions, please call your primary care clinic.  If you do not have a primary care provider, please call [phone] and they will assist you.        Care EveryWhere ID     This is your Care EveryWhere ID. This could be used by other organizations to access your [facility] medical records  [id number]        Your Vitals Were     Pulse Temperature Respirations Height Pulse Oximetry BMI (Body Mass Index)    87 98.4  F (36.9  C) (Oral) 20 5' 7\" (1.702 m) 98% 26.31 kg/m2       Blood Pressure from Last 3 Encounters:   09/20/17 126/80   09/18/17 117/75   08/21/17 123/75    Weight from Last 3 Encounters:   09/20/17 168 lb (76.2 kg)   09/18/17 169 lb 3.2 oz (76.7 kg)   08/21/17 160 lb (72.6 kg)              Today, you had the following     No orders found for display         Today's Medication Changes          These changes are accurate as of: 9/20/17  8:08 AM.  If you have any questions, ask your nurse or doctor.               Start taking these medicines.        Dose/Directions    * oxyCODONE-acetaminophen 5-325 MG per tablet   Commonly known as:  PERCOCET   Used for:  Acute right-sided low back pain with right-sided sciatica   Started by:  Denise White APRN CNP        4 Tablets per day prn acute pain and wean down as able to 1-2 tablets per day/wean off if able.   Quantity:  60 tablet   Refills:  0       * oxyCODONE-acetaminophen 5-325 MG per tablet   Commonly known as:  PERCOCET   Used for:  Acute right-sided low back pain with right-sided sciatica   Started by:  Denise White APRN CNP        Dose:  1 tablet   Start taking on:  10/4/2017   Take 1 tablet by mouth 2 times daily as needed for moderate to severe pain or pain 1-2 tablets per day PRN severe pain. No additional refills will be granted after this.   Quantity:  30 tablet   Refills:  0       * Notice:  This list has 2 medication(s) that are the same as other medications prescribed for you. Read the " directions carefully, and ask your doctor or other care provider to review them with you.         Where to get your medicines      Some of these will need a paper prescription and others can be bought over the counter.  Ask your nurse if you have questions.     Bring a paper prescription for each of these medications     oxyCODONE-acetaminophen 5-325 MG per tablet    oxyCODONE-acetaminophen 5-325 MG per tablet                Primary Care Provider Office Phone # Fax #    VERONICA [facility] [phone] [address] [place] [address]        Equal Access to Services     [facility]: Hadii aad ku hadasho Soomaali, waaxda luqadaha, qaybta kaalmada adeegyada, kieran sanchez . So [facility] [phone].    ATENCIÓN: Si habla español, tiene a whipple disposición servicios gratuitos de asistencia lingüística. [facility] [phone].    We comply with applicable federal civil rights laws and [place] laws. We do not discriminate on the basis of race, color, national origin, age, disability sex, sexual orientation or gender identity.            Thank you!     Thank you for choosing [facility]  for your care. Our goal is always to provide you with excellent care. Hearing back from our patients is one way we can continue to improve our services. Please take a few minutes to complete the written survey that you may receive in the mail after your visit with us. Thank you!             Your Updated Medication List - Protect others around you: Learn how to safely use, store and throw away your medicines at www.disposemymeds.org.          This list is accurate as of: 9/20/17  8:08 AM.  Always use your most recent med list.                   Brand Name Dispense Instructions for use Diagnosis    aspirin 81 MG tablet      Take 81 mg by mouth daily        Blood Pressure Monitor Kit     1 kit    1 kit daily    Chronic congestive heart failure, unspecified  congestive heart failure type (H)       carvedilol 6.25 MG tablet    COREG    90 tablet    Take 1 tablet (6.25 mg) by mouth 2 times daily (with meals)    Chronic congestive heart failure, unspecified congestive heart failure type (H)       furosemide 20 MG tablet    LASIX    60 tablet    Take 1 tablet (20 mg) by mouth daily as needed Only take if weight up by 3lbs    JOHNSON (dyspnea on exertion), Chronic congestive heart failure, unspecified congestive heart failure type (H), Acute on chronic systolic congestive heart failure (H)       lisinopril 5 MG tablet    PRINIVIL/ZESTRIL    180 tablet    Take 1 tablet (5 mg) by mouth 2 times daily    Chronic congestive heart failure, unspecified congestive heart failure type (H)       * oxyCODONE-acetaminophen 5-325 MG per tablet    PERCOCET    60 tablet    4 Tablets per day prn acute pain and wean down as able to 1-2 tablets per day/wean off if able.    Acute right-sided low back pain with right-sided sciatica       * oxyCODONE-acetaminophen 5-325 MG per tablet   Start taking on:  10/4/2017    PERCOCET    30 tablet    Take 1 tablet by mouth 2 times daily as needed for moderate to severe pain or pain 1-2 tablets per day PRN severe pain. No additional refills will be granted after this.    Acute right-sided low back pain with right-sided sciatica       spironolactone 25 MG tablet    ALDACTONE    45 tablet    Take 0.5 tablets (12.5 mg) by mouth daily    Chronic congestive heart failure, unspecified congestive heart failure type (H)       * Notice:  This list has 2 medication(s) that are the same as other medications prescribed for you. Read the directions carefully, and ask your doctor or other care provider to review them with you.

## 2017-09-20 NOTE — NURSING NOTE
"Chief Complaint   Patient presents with     RECHECK       Initial /80  Pulse 87  Temp 98.4  F (36.9  C) (Oral)  Resp 20  Ht 5' 7\" (1.702 m)  Wt 168 lb (76.2 kg)  SpO2 98%  BMI 26.31 kg/m2 Estimated body mass index is 26.31 kg/(m^2) as calculated from the following:    Height as of this encounter: 5' 7\" (1.702 m).    Weight as of this encounter: 168 lb (76.2 kg).  Medication Reconciliation: complete       Kang Valladares MA       "

## 2017-09-20 NOTE — PROGRESS NOTES
"  SUBJECTIVE:   Akshat Fragoso is a 52 year old male who presents to clinic today for the following health issues:  Chief Complaint   Patient presents with     Back Pain     Sciatica     Refill Request     Percocet       Follow up office visit 08/21/2017 for sciatic pain.   Akshat has a history of low back pain and sciatica.  Started many years ago and he has had an occassional flare up since.  He went on a long car trip at the end of August.  He was having his flare up at the time of his trip.  \"The trip was awful. I sat on ice packs.\"    Today, the pain is in his right buttock and shoots down his right leg.  He denies numbness or tingling down his right leg/foot.  He is taking ibuprofen, ice/heat which do not work.  Home stretching and trying to make his symptoms better.     History of imaging on his low back that was normal.  He was told that physical therapy is all he can do.    He is in the clinic today requesting refills of his Percocet. He reports that his Advil is not helpful.  Percocet is the only thing that has helped in the past.  He reports that his intent is to proceed physical therapy as well as continue chiropractor care.      CHF:   Going well.  No weight changes or shortness of breath.  He is feeling much better.  He recently had a follow-up appointment with cardiology.  No changes were made at the time of that appointment.  His next follow-up appointment with cardiology is in 6 weeks.    Problem list and histories reviewed & adjusted, as indicated.  Additional history: as documented    Patient Active Problem List   Diagnosis     CARDIOVASCULAR SCREENING; LDL GOAL LESS THAN 160     Acute right lumbar radiculopathy     Personal history of smoking     CHF (congestive heart failure) (H)     Acute systolic heart failure (H)     Cardiomyopathy, unspecified (H)     Cardiomyopathy, nonischemic (H)     Pulmonary nodules, next CT due 1/2018     Personal history of tobacco use, presenting hazards to health     " S/P ICD (internal cardiac defibrillator) procedure     Past Surgical History:   Procedure Laterality Date     None         Social History   Substance Use Topics     Smoking status: Former Smoker     Packs/day: 0.50     Years: 15.00     Types: Cigarettes     Quit date: 12/1/2016     Smokeless tobacco: Former User     Quit date: 10/24/2011     Alcohol use No     History reviewed. No pertinent family history.      Current Outpatient Prescriptions   Medication Sig Dispense Refill     [START ON 10/4/2017] oxyCODONE-acetaminophen (PERCOCET) 5-325 MG per tablet Take 1 tablet by mouth 2 times daily as needed for moderate to severe pain or pain 1-2 tablets per day PRN severe pain. No additional refills will be granted after this. 30 tablet 0     oxyCODONE-acetaminophen (PERCOCET) 5-325 MG per tablet 4 Tablets per day prn acute pain and wean down as able to 1-2 tablets per day/wean off if able. 60 tablet 0     aspirin 81 MG tablet Take 81 mg by mouth daily       lisinopril (PRINIVIL/ZESTRIL) 5 MG tablet Take 1 tablet (5 mg) by mouth 2 times daily 180 tablet 3     spironolactone (ALDACTONE) 25 MG tablet Take 0.5 tablets (12.5 mg) by mouth daily 45 tablet 3     carvedilol (COREG) 6.25 MG tablet Take 1 tablet (6.25 mg) by mouth 2 times daily (with meals) 90 tablet 3     Blood Pressure Monitor KIT 1 kit daily 1 kit 0     furosemide (LASIX) 20 MG tablet Take 1 tablet (20 mg) by mouth daily as needed Only take if weight up by 3lbs 60 tablet 1     Allergies   Allergen Reactions     No Known Allergies      Recent Labs   Lab Test  06/08/17   0739  01/12/17   0655   01/10/17   0718   01/09/17   1001   LDL   --    --    --   64   --    --    HDL   --    --    --   39*   --    --    TRIG   --    --    --   103   --    --    ALT   --    --    --    --    --   111*   CR  0.90  0.85   < >  0.90   < >  0.85   GFRESTIMATED  90  >90  Non  GFR Calc     < >  90   < >  >90  Non  GFR Calc     GFRESTBLACK   ">90   GFR Calc    >90   GFR Calc     < >  >90   GFR Calc     < >  >90   GFR Calc     POTASSIUM  4.2  4.2   < >  4.4   --   4.9   TSH   --    --    --    --    --   1.34    < > = values in this interval not displayed.      BP Readings from Last 3 Encounters:   09/20/17 126/80   09/18/17 117/75   08/21/17 123/75    Wt Readings from Last 3 Encounters:   09/20/17 168 lb (76.2 kg)   09/18/17 169 lb 3.2 oz (76.7 kg)   08/21/17 160 lb (72.6 kg)                  Labs reviewed in EPIC          Reviewed and updated as needed this visit by clinical staff  Tobacco  Allergies  Med Hx  Surg Hx  Fam Hx  Soc Hx      Reviewed and updated as needed this visit by Provider         ROS:  Constitutional, HEENT, cardiovascular, pulmonary, gi and gu systems are negative, except as otherwise noted.      OBJECTIVE:   /80  Pulse 87  Temp 98.4  F (36.9  C) (Oral)  Resp 20  Ht 5' 7\" (1.702 m)  Wt 168 lb (76.2 kg)  SpO2 98%  BMI 26.31 kg/m2  Body mass index is 26.31 kg/(m^2).  GENERAL APPEARANCE: healthy, alert and mild distress. Smiling/offering information freely. He is sitting in the chair leaning to his left.  SKIN: warm and dry  MS: Ambulatory with an antalgic gait. Holding onto his right buttock with ambulation.  PSYCH: mentation appears normal and affect normal/bright.  Good eye contact.    ASSESSMENT/PLAN:     (M54.41) Acute right-sided low back pain with right-sided sciatica  (primary encounter diagnosis)  Comment: Acute  Plan: oxyCODONE-acetaminophen (PERCOCET) 5-325 MG per        tablet, oxyCODONE-acetaminophen (PERCOCET)         5-325 MG per tablet, DISCONTINUED:         oxyCODONE-acetaminophen (PERCOCET) 5-325 MG per        tablet          I did discuss with Akshat my concerns for his repeated requests for Percocet.  With today's appointment he requested 4 tablets per day for 2 weeks and then a 30 tablet refill \"and then I'm done.\"  I discussed with him " and reviewed with him the [place] prescription monitoring program website and his total use of Percocet tablets since January of this year. His total prescriptions for Percocet/need/requests of him are 411 tablets. Of those 411 tablets prescribed, I prescribed 355. I discussed with him that he does appear to be uncomfortable at the time of these prescription requests, but that I think he needs to empty more lifestyle and coping strategies.  He states he does not want to be addicted, would never be addicted, and understands that these are narcotics.  I did go ahead and approve 60 tablets today. He is to use 4 tablets per day as needed only/not scheduled and he is to wean down on these as he starts his physical therapy and gets into a better exercise program.  He can continue care at chiropractor.  His 30 tablet supply does have the statement that I would not approve additional refills.  If he continues to have pain after physical therapy, he needs to follow-up with orthopedics.  We discussed and reviewed the potential for imaging, but this was waived today.  I would likely proceed with lumbar films and/or consider magnetic resonance imaging but I would like or so to order the magnetic resonance imaging if indicated.  Akshat states he understands.    (I50.22) Chronic systolic congestive heart failure (H)  Comment: controlled  Plan: continue care with cardiology.    VERONICA Doran [facility]

## 2017-09-22 DIAGNOSIS — I50.23 ACUTE ON CHRONIC SYSTOLIC CONGESTIVE HEART FAILURE (H): ICD-10-CM

## 2017-09-22 DIAGNOSIS — R06.09 DOE (DYSPNEA ON EXERTION): ICD-10-CM

## 2017-09-22 DIAGNOSIS — I50.9 CHRONIC CONGESTIVE HEART FAILURE, UNSPECIFIED CONGESTIVE HEART FAILURE TYPE: ICD-10-CM

## 2017-09-22 RX ORDER — FUROSEMIDE 20 MG
20 TABLET ORAL DAILY PRN
Qty: 90 TABLET | Refills: 3 | Status: SHIPPED | OUTPATIENT
Start: 2017-09-22 | End: 2018-10-26

## 2017-09-22 NOTE — TELEPHONE ENCOUNTER
Pending Prescriptions:                       Disp   Refills    furosemide (LASIX) 20 MG tablet           60 tab*1            Sig: Take 1 tablet (20 mg) by mouth daily as needed           Only take if weight up by 3lbs    Last Visit with Dr. Brynn Moy 4/24/17  Quantity 60  Req. Received 9/22/17    Estrella August CMA.

## 2017-09-28 ENCOUNTER — TELEPHONE (OUTPATIENT)
Dept: FAMILY MEDICINE | Facility: CLINIC | Age: 52
End: 2017-09-28

## 2017-10-09 ENCOUNTER — TELEPHONE (OUTPATIENT)
Dept: CARDIOLOGY | Facility: CLINIC | Age: 52
End: 2017-10-09

## 2017-10-09 ENCOUNTER — ALLIED HEALTH/NURSE VISIT (OUTPATIENT)
Dept: CARDIOLOGY | Facility: CLINIC | Age: 52
End: 2017-10-09
Attending: INTERNAL MEDICINE
Payer: COMMERCIAL

## 2017-10-09 DIAGNOSIS — I42.8 CARDIOMYOPATHY, NONISCHEMIC (H): Primary | ICD-10-CM

## 2017-10-09 PROBLEM — Z95.810 ICD (IMPLANTABLE CARDIOVERTER-DEFIBRILLATOR) IN PLACE: Status: ACTIVE | Noted: 2017-10-09

## 2017-10-09 PROCEDURE — 93295 DEV INTERROG REMOTE 1/2/MLT: CPT | Performed by: INTERNAL MEDICINE

## 2017-10-09 PROCEDURE — 93296 REM INTERROG EVL PM/IDS: CPT | Mod: ZF

## 2017-10-09 NOTE — MR AVS SNAPSHOT
After Visit Summary   10/9/2017    Akshat Fragoso    MRN: [id number]           Patient Information     Date Of Birth          1965        Visit Information        Provider Department      10/9/2017 6:00 AM UC ICD REMOTE [facility]        Today's Diagnoses     Cardiomyopathy, nonischemic (H)    -  1       Follow-ups after your visit        Your next 10 appointments already scheduled     Oct 11, 2017  9:30 AM CDT   (Arrive by 9:15 AM)   SERENA Spine with Akshat Suazo PT   [facility]    [address] [place] [address]   [phone]            Oct 27, 2017  2:00 PM CDT   (Arrive by 1:45 PM)   Return Visit with Channing Cardona MD   [facility] ([facility])    [address] [address] [phone]              Who to contact     If you have questions or need follow up information about today's clinic visit or your schedule please contact [facility] directly at [phone].  Normal or non-critical lab and imaging results will be communicated to you by [employer], letter or phone within 4 business days after the clinic has received the results. If you do not hear from us within 7 days, please contact the clinic through [employer] or phone. If you have a critical or abnormal lab result, we will notify you by phone as soon as possible.  Submit refill requests through [employer] or call your pharmacy and they will forward the refill request to us. Please allow 3 business days for your refill to be completed.          Additional Information About Your Visit        MyChart Information     [employer] gives you secure access to your electronic health record. If you see a primary care provider, you can also send messages to your care team and make appointments. If you have questions, please call your primary care clinic.  If you do not have a primary  care provider, please call [phone] and they will assist you.        Care EveryWhere ID     This is your Care EveryWhere ID. This could be used by other organizations to access your [place] medical records  [id number]         Blood Pressure from Last 3 Encounters:   09/20/17 126/80   09/18/17 117/75   08/21/17 123/75    Weight from Last 3 Encounters:   09/20/17 76.2 kg (168 lb)   09/18/17 76.7 kg (169 lb 3.2 oz)   08/21/17 72.6 kg (160 lb)              We Performed the Following     INTERROGATION DEVICE EVAL REMOTE, PACER/ICD        Primary Care Provider Office Phone # Fax #    Denise VERONICA [facility] [phone] [address] [place] [address]        Equal Access to Services     [facility] : Hadii aad ku hadasho Soomaali, waaxda luqadaha, qaybta kaalmada adeegyada, waxay juanain haysonido sanchez . So [facility] [phone].    ATENCIÓN: Si habla español, tiene a whipple disposición servicios gratuitos de asistencia lingüística. Llame al [phone].    We comply with applicable federal civil rights laws and [place] laws. We do not discriminate on the basis of race, color, national origin, age, disability, sex, sexual orientation, or gender identity.            Thank you!     Thank you for choosing [facility]  for your care. Our goal is always to provide you with excellent care. Hearing back from our patients is one way we can continue to improve our services. Please take a few minutes to complete the written survey that you may receive in the mail after your visit with us. Thank you!             Your Updated Medication List - Protect others around you: Learn how to safely use, store and throw away your medicines at www.disposemymeds.org.          This list is accurate as of: 10/9/17 11:59 PM.  Always use your most recent med list.                   Brand Name Dispense Instructions for use Diagnosis    aspirin 81 MG tablet      Take 81 mg by mouth daily         Blood Pressure Monitor Kit     1 kit    1 kit daily    Chronic congestive heart failure, unspecified congestive heart failure type (H)       carvedilol 6.25 MG tablet    COREG    90 tablet    Take 1 tablet (6.25 mg) by mouth 2 times daily (with meals)    Chronic congestive heart failure, unspecified congestive heart failure type (H)       furosemide 20 MG tablet    LASIX    90 tablet    Take 1 tablet (20 mg) by mouth daily as needed Only take if weight up by 3lbs    JOHNSON (dyspnea on exertion), Chronic congestive heart failure, unspecified congestive heart failure type (H), Acute on chronic systolic congestive heart failure (H)       lisinopril 5 MG tablet    PRINIVIL/ZESTRIL    180 tablet    Take 1 tablet (5 mg) by mouth 2 times daily    Chronic congestive heart failure, unspecified congestive heart failure type (H)       * oxyCODONE-acetaminophen 5-325 MG per tablet    PERCOCET    60 tablet    4 Tablets per day prn acute pain and wean down as able to 1-2 tablets per day/wean off if able.    Acute right-sided low back pain with right-sided sciatica       * oxyCODONE-acetaminophen 5-325 MG per tablet    PERCOCET    30 tablet    Take 1 tablet by mouth 2 times daily as needed for moderate to severe pain or pain 1-2 tablets per day PRN severe pain. No additional refills will be granted after this.    Acute right-sided low back pain with right-sided sciatica       spironolactone 25 MG tablet    ALDACTONE    45 tablet    Take 0.5 tablets (12.5 mg) by mouth daily    Chronic congestive heart failure, unspecified congestive heart failure type (H)       * Notice:  This list has 2 medication(s) that are the same as other medications prescribed for you. Read the directions carefully, and ask your doctor or other care provider to review them with you.

## 2017-10-10 PROBLEM — Z95.810 S/P ICD (INTERNAL CARDIAC DEFIBRILLATOR) PROCEDURE: Status: RESOLVED | Noted: 2017-06-08 | Resolved: 2017-10-10

## 2017-10-10 NOTE — PROGRESS NOTES
"[employer] Latitude remote ICD transmission received and reviewed. Device transmission sent per MD orders. Pt's wife called stating \"Akshat just isn't feeling right\". His presenting rhythm is a regular ventricular rhythm @ 70 bpm. 1 VT episode recorded on 9/23/17 @ 160 bpm. The morphology appears similar to his intrinsic. Normal device function. = 0%. Lead trends appear stable. Battery estimates 12 years to NEETA. Pt's wife notified of transmission results.   Remote ICD transmission  "

## 2017-10-10 NOTE — TELEPHONE ENCOUNTER
"[employer] Latitude remote ICD transmission received and reviewed. Device transmission sent per MD orders. Pt's wife called stating \"Akshat just isn't feeling right\". His presenting rhythm is a regular ventricular rhythm @ 70 bpm. 1 VT episode recorded on 9/23/17 @ 160 bpm. The morphology appears similar to his intrinsic. Normal device function. = 0%. Lead trends appear stable. Battery estimates 12 years to NEETA. Pt's wife notified of transmission results.   Remote ICD transmission      "

## 2017-10-19 ENCOUNTER — TELEPHONE (OUTPATIENT)
Dept: OTHER | Facility: CLINIC | Age: 52
End: 2017-10-19

## 2017-10-20 NOTE — TELEPHONE ENCOUNTER
"Cards fellow overnight coverage    Received message from Akshat Fragoso's wife that ICD information \"not transmitting.\" Returned call [phone]. No answer. Left message to call back if still needed.   Of note, WILDER [place]'s note from 10/9/17 references a successful [facility] remote ICD transmission so not sure if this is a new problem or misunderstanding. Will remain available tonight for callback.    Vasquez Vlae MD  Cardiology Fellow  [phone]    "

## 2017-10-23 NOTE — TELEPHONE ENCOUNTER
Spoke with patient's wife regarding the phone call regarding the ICD not transmitting.  She states everything is going well now and has an appointment on Friday in clinic.   LESLEY Jain, CNP

## 2017-10-26 ENCOUNTER — PRE VISIT (OUTPATIENT)
Dept: CARDIOLOGY | Facility: CLINIC | Age: 52
End: 2017-10-26

## 2017-10-26 NOTE — TELEPHONE ENCOUNTER
4/17/17--Cardiac MRI  IMPRESSION:  1.  Severely dilated left ventricle with severely reduced global  function with a calculated ejection fraction of  12 %.  2.  Moderately dilated right ventricle with moderately reduced global  function with a calculated ejection fraction of 30%.   3.  There is mild mitral and tricuspid regurgitation.  4.  The left atrium is severely dilated. The right atrium is mildly  dilated.  5.  On delayed enhancement imaging, there is extensive  hyperenhancement in multiple non-ischemic patterns: transmural,  mid-myocardial, subendocardial, and epicardial. 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 The extent and  distribution of hyperenhancement are unchanged from the prior study.  6.  Compared with the prior study dated 1/11/2017, right ventricular  function has improved.     1/9/17--Coronary angiogram  CORONARY ANGIOGRAM:   1. Both coronary arteries arise from their respective cusps.  2. Left-dominant.  3. LM is without angiographic evidence of disease.   4. LAD supplies the entire apex (type 3) and gives rise to septal perforators, D1 and D2.  The mLAD has mild luminal irregularities.   5. LCX gives rise to large OM1 and small OM2 along with trivial distal OM vessels before supplying LPDA and LPL branches.  There is no angiographic evidence of disease.   6. RCA is nondominant. The pRCA has a <25% stenosis.       COMPLICATIONS:  1. None     SUMMARY:   1. Nonischemic cardiomyopathy.  2. Normal right-sided and increased left-sided filling pressures.  3. Mild secondary pulmonary hypertension with a mean PAP of 34 mmHg.  4. Normal cardiac output of 4.84 L/min and cardiac index of 2.72.  2. No angiographic evidence of obstructive coronary artery disease with minimal nonobstructive disease.  3. Arterial and venous sheaths removed in the cath lab.     PLAN:   1. Aspirin 81 mg po daily lifelong.  2. Management of heart failure per primary team.  3. Bedrest per protocol.  4.  Return to the primary inpatient team for further evaluation and management.  5. Continued medical management and lifestyle modification for cardiovascular risk factor optimization.     1/9/17--Echocardiogram  Interpretation Summary  Severe left ventricular dilation is present. Severe diffuse hypokinesis is  present. The Ejection Fraction is estimated at 10-15%. Traced at 13%.  Mild right ventricular dilation is present. Global right ventricular function  is mildly reduced.  Left ventricular diastolic function is indeterminate.  Dilation of the inferior vena cava is present with abnormal respiratory  variation in diameter

## 2017-10-27 ENCOUNTER — OFFICE VISIT (OUTPATIENT)
Dept: CARDIOLOGY | Facility: CLINIC | Age: 52
End: 2017-10-27
Attending: INTERNAL MEDICINE
Payer: COMMERCIAL

## 2017-10-27 VITALS
HEART RATE: 80 BPM | BODY MASS INDEX: 26.97 KG/M2 | HEIGHT: 67 IN | SYSTOLIC BLOOD PRESSURE: 123 MMHG | WEIGHT: 171.8 LBS | DIASTOLIC BLOOD PRESSURE: 78 MMHG | OXYGEN SATURATION: 95 %

## 2017-10-27 DIAGNOSIS — I50.22 CHRONIC SYSTOLIC CONGESTIVE HEART FAILURE (H): Primary | ICD-10-CM

## 2017-10-27 PROCEDURE — 99213 OFFICE O/P EST LOW 20 MIN: CPT | Mod: GC | Performed by: INTERNAL MEDICINE

## 2017-10-27 PROCEDURE — 99212 OFFICE O/P EST SF 10 MIN: CPT | Mod: ZF

## 2017-10-27 ASSESSMENT — PAIN SCALES - GENERAL: PAINLEVEL: NO PAIN (0)

## 2017-10-27 NOTE — MR AVS SNAPSHOT
After Visit Summary   10/27/2017    Akshat Fragoso    MRN: [id number]           Patient Information     Date Of Birth          1965        Visit Information        Provider Department      10/27/2017 2:00 PM Channing Cardona MD [facility]        Today's Diagnoses     Chronic systolic congestive heart failure (H)    -  1      Care Instructions    Thank you for your visit today.  Please call me with any questions or concerns.   Mando Kay RN  Cardiology Care Coordinator  [phone], press option 1 then option 3          Follow-ups after your visit        Who to contact     If you have questions or need follow up information about today's clinic visit or your schedule please contact [facility] directly at [phone].  Normal or non-critical lab and imaging results will be communicated to you by MyChart, letter or phone within 4 business days after the clinic has received the results. If you do not hear from us within 7 days, please contact the clinic through [employer] or phone. If you have a critical or abnormal lab result, we will notify you by phone as soon as possible.  Submit refill requests through [employer] or call your pharmacy and they will forward the refill request to us. Please allow 3 business days for your refill to be completed.          Additional Information About Your Visit        MyChart Information     [facility] [facility] gives you secure access to your electronic health record. If you see a primary care provider, you can also send messages to your care team and make appointments. If you have questions, please call your primary care clinic.  If you do not have a primary care provider, please call [phone] and they will assist you.        Care EveryWhere ID     This is your Care EveryWhere ID. This could be used by other organizations to access your [place] medical records  [id number]        Your Vitals Were     Pulse Height Pulse Oximetry BMI (Body Mass Index)  "         80 1.702 m (5' 7\") 95% 26.91 kg/m2         Blood Pressure from Last 3 Encounters:   10/27/17 123/78   09/20/17 126/80   09/18/17 117/75    Weight from Last 3 Encounters:   10/27/17 77.9 kg (171 lb 12.8 oz)   09/20/17 76.2 kg (168 lb)   09/18/17 76.7 kg (169 lb 3.2 oz)              Today, you had the following     No orders found for display       Primary Care Provider Office Phone # Fax #    Denise Nagyguille, VERONICA [phone] [address] [place] [address]        Equal Access to Services     MAXIMINO TALAMANTES : Hadii carlito marquezo Sojay, waaxda luqadaha, qaybta kaalmada adeegyada, kieran ariasin pepito sanchez . So [facility] [phone].    ATENCIÓN: Si habla español, tiene a whipple disposición servicios gratuitos de asistencia lingüística. LlMercy Health St[facility] [phone].    We comply with applicable federal civil rights laws and [place] laws. We do not discriminate on the basis of race, color, national origin, age, disability, sex, sexual orientation, or gender identity.            Thank you!     Thank you for choosing [facility]  for your care. Our goal is always to provide you with excellent care. Hearing back from our patients is one way we can continue to improve our services. Please take a few minutes to complete the written survey that you may receive in the mail after your visit with us. Thank you!             Your Updated Medication List - Protect others around you: Learn how to safely use, store and throw away your medicines at www.disposemymeds.org.          This list is accurate as of: 10/27/17 11:59 PM.  Always use your most recent med list.                   Brand Name Dispense Instructions for use Diagnosis    aspirin 81 MG tablet      Take 81 mg by mouth daily        Blood Pressure Monitor Kit     1 kit    1 kit daily    Chronic congestive heart failure, unspecified congestive heart failure type (H)       carvedilol 6.25 MG tablet    COREG    " 90 tablet    Take 1 tablet (6.25 mg) by mouth 2 times daily (with meals)    Chronic congestive heart failure, unspecified congestive heart failure type (H)       furosemide 20 MG tablet    LASIX    90 tablet    Take 1 tablet (20 mg) by mouth daily as needed Only take if weight up by 3lbs    JOHNSON (dyspnea on exertion), Chronic congestive heart failure, unspecified congestive heart failure type (H), Acute on chronic systolic congestive heart failure (H)       lisinopril 5 MG tablet    PRINIVIL/ZESTRIL    180 tablet    Take 1 tablet (5 mg) by mouth 2 times daily    Chronic congestive heart failure, unspecified congestive heart failure type (H)       * oxyCODONE-acetaminophen 5-325 MG per tablet    PERCOCET    60 tablet    4 Tablets per day prn acute pain and wean down as able to 1-2 tablets per day/wean off if able.    Acute right-sided low back pain with right-sided sciatica       * oxyCODONE-acetaminophen 5-325 MG per tablet    PERCOCET    30 tablet    Take 1 tablet by mouth 2 times daily as needed for moderate to severe pain or pain 1-2 tablets per day PRN severe pain. No additional refills will be granted after this.    Acute right-sided low back pain with right-sided sciatica       spironolactone 25 MG tablet    ALDACTONE    45 tablet    Take 0.5 tablets (12.5 mg) by mouth daily    Chronic congestive heart failure, unspecified congestive heart failure type (H)       * Notice:  This list has 2 medication(s) that are the same as other medications prescribed for you. Read the directions carefully, and ask your doctor or other care provider to review them with you.

## 2017-10-27 NOTE — NURSING NOTE
Chief Complaint   Patient presents with     Follow Up For     Congestive heart failure, stage C, NYHA class I, LVEF 12%, RVEF 30%, non-ischemic etiology     Vitals were taken and medications were reconciled.  GARY Fisher  1:52 PM

## 2017-10-27 NOTE — PATIENT INSTRUCTIONS
Thank you for your visit today.  Please call me with any questions or concerns.   Mando Kay RN  Cardiology Care Coordinator  [phone], press option 1 then option 3

## 2017-10-27 NOTE — LETTER
"10/27/2017      RE: Akshat Fragoso  [address]  [facility] [address]       Dear Colleague,    Thank you for the opportunity to participate in the care of your patient, Akshat Fragoso, at the [facility] HEART [facility] at [facility]. Please see a copy of my visit note below.    CARDIOLOGY NEW OFFICE VISIT    HPI: Akshat Fragoso is a 52 year old male who is seen in clinic today accompanied by his wife. He is here for a 6 month follow up for his NICM. Briefly, he was recently diagnosed with nonischemic cardiomyopathy and LVEF of 9% and RVEF of 18% by CMRI (1/2017). A follow up CMRI (4/2017) shows an LVEF of 12% and RVEF of 30% despite subjective improvement in the degree of limitations with ADLs, and he underwent an ICD placement after that.     On his last visit he was initiated on GDMT with Coreg (6.25 mg BID, to be gradually increased to 12.5 mg BID, in increments of 3.125 mg), Lisinopril 5 mg BID and Aldactone 12.5 mg. He says whenever he increases his coreg dose, for a couple of weeks he feels tired, fatigued, wiped out and dizzy. He also notices occasional visual disturbances that are transient. However once he has been on a regimen for 2-3 weeks, his symptoms subside. He has been rather cautious in up titrating his Coreg and is currently at 12.5 mg in the morning and 9.375 in the evening.     He denies chest discomfort, dyspnea, PND, orthopnea, pedal edema, early satiety or palpitations. He says compared to 6 months ago overall he is feeling \"100% better\".      PAST MEDICAL HISTORY:  Past Medical History:   Diagnosis Date     CARDIOVASCULAR SCREENING; LDL GOAL LESS THAN 160 5/9/2010     Pneumonia 11/2011       CURRENT MEDICATIONS:  Current Outpatient Prescriptions   Medication Sig Dispense Refill     furosemide (LASIX) 20 MG tablet Take 1 tablet (20 mg) by mouth daily as needed Only take if weight up by 3lbs 90 tablet 3     oxyCODONE-acetaminophen (PERCOCET) 5-325 MG per " tablet Take 1 tablet by mouth 2 times daily as needed for moderate to severe pain or pain 1-2 tablets per day PRN severe pain. No additional refills will be granted after this. 30 tablet 0     oxyCODONE-acetaminophen (PERCOCET) 5-325 MG per tablet 4 Tablets per day prn acute pain and wean down as able to 1-2 tablets per day/wean off if able. 60 tablet 0     aspirin 81 MG tablet Take 81 mg by mouth daily       lisinopril (PRINIVIL/ZESTRIL) 5 MG tablet Take 1 tablet (5 mg) by mouth 2 times daily 180 tablet 3     spironolactone (ALDACTONE) 25 MG tablet Take 0.5 tablets (12.5 mg) by mouth daily 45 tablet 3     carvedilol (COREG) 6.25 MG tablet Take 1 tablet (6.25 mg) by mouth 2 times daily (with meals) 90 tablet 3     Blood Pressure Monitor KIT 1 kit daily 1 kit 0       PAST SURGICAL HISTORY:  Past Surgical History:   Procedure Laterality Date     None         ALLERGIES  No known allergies    FAMILY HX:  No family history on file.    SOCIAL HX:  Social History     Social History     Marital status:      Spouse name: Cheryl     Number of children: 2     Years of education: N/A     Occupational History     Construction  Self     Social History Main Topics     Smoking status: Former Smoker     Packs/day: 0.50     Years: 15.00     Types: Cigarettes     Quit date: 12/1/2016     Smokeless tobacco: Former User     Quit date: 10/24/2011     Alcohol use No     Drug use: No     Sexual activity: Yes     Partners: Female     Birth control/ protection: Condom     Other Topics Concern     Parent/Sibling W/ Cabg, Mi Or Angioplasty Before 65f 55m? Yes     both parents had stints placed      Social History Narrative       ROS:  Constitutional: No fever, chills, or sweats. No weight gain/loss.   ENT: No visual disturbance, ear ache, epistaxis, sore throat.   Allergies/Immunologic: Negative.   Respiratory: No cough, hemoptysis.   Cardiovascular: As per HPI.   GI: No nausea, vomiting, hematemesis, melena, or hematochezia.   :  "No urinary frequency, dysuria, or hematuria.   Integument: Negative.   Psychiatric: Negative.   Neuro: Negative.   Endocrinology: Negative.   Musculoskeletal: No myalgia.    VITAL SIGNS:  /78 (BP Location: Right arm, Patient Position: Chair, Cuff Size: Adult Regular)  Pulse 80  Ht 1.702 m (5' 7\")  Wt 77.9 kg (171 lb 12.8 oz)  SpO2 95%  BMI 26.91 kg/m2  Body mass index is 26.91 kg/(m^2).  Wt Readings from Last 2 Encounters:   10/27/17 77.9 kg (171 lb 12.8 oz)   17 76.2 kg (168 lb)       PHYSICAL EXAM  /78 (BP Location: Right arm, Patient Position: Chair, Cuff Size: Adult Regular)  Pulse 80  Ht 1.702 m (5' 7\")  Wt 77.9 kg (171 lb 12.8 oz)  SpO2 95%  BMI 26.91 kg/m2  GEN: aao x 3, NAD  Neck: No JVD elevation  LUNGS: No wheezing or rales  HEART: S1S2 audible, no murmurs or rubs. Regular rhythm  ABDOMEN: Soft, nt, nd. +BS  EXTREMITIES: Warm calves, +DPs, no LE edema  NEURO: aao x 3, no focal deficits    LABS    Lab Results   Component Value Date    WBC 11.9 2017     Lab Results   Component Value Date    RBC 5.34 2017     Lab Results   Component Value Date    HGB 16.4 2017     Lab Results   Component Value Date    HCT 51.1 2017     No components found for: MCT  Lab Results   Component Value Date    MCV 96 2017     Lab Results   Component Value Date    MCH 30.7 2017     Lab Results   Component Value Date    MCHC 32.1 2017     Lab Results   Component Value Date    RDW 13.6 2017     Lab Results   Component Value Date     2017      Recent Labs   Lab Test  17   0655  17   1735   NA  139  138   POTASSIUM  4.2  4.2   CHLORIDE  103  105   CO2  31  21   ANIONGAP  5  12   GLC  91  90   BUN  12  16   CR  0.85  0.94   TONY  8.6  8.5     Recent Labs   Lab Test  01/10/17   0718   CHOL  124   HDL  39*   LDL  64   TRIG  103   NHDL  84     EC2017  Sinus tachycardia with PACs. Incomplete LBBB.    ECHOCARDIOGRAM: 2017  Severe left " ventricular dilation is present. Severe diffuse hypokinesis is present. The Ejection Fraction is estimated at 10-15%. Traced at 13%.  Mild right ventricular dilation is present. Global right ventricular function is mildly reduced.  Left ventricular diastolic function is indeterminate.  Dilation of the inferior vena cava is present with abnormal respiratory variation in diameter.     Left Ventricle  Severe left ventricular dilation is present. Severely (EF <30%) reduced left ventricular function is present. The Ejection Fraction is estimated at 10-15%. Left ventricular diastolic function is indeterminate. Severe diffuse hypokinesis is present.     Right Ventricle  Mild right ventricular dilation is present. Global right ventricular function  is mildly reduced.  Atria  Moderate left atrial enlargement is present. Mild right atrial enlargement is  present.     Mitral Valve  Mild mitral annular calcification is present. Trace to mild mitral  insufficiency is present.     Aortic Valve  Aortic valve is normal in structure and function.     Tricuspid Valve  The tricuspid valve is normal. The right ventricular systolic pressure is approximated at 19.4 mmHg plus the right atrial pressure. Trace tricuspid  insufficiency is present.     Pulmonic Valve  The pulmonic valve is normal.     Vessels  The thoracic aorta is normal. Dilation of the inferior vena cava is present with abnormal respiratory variation in diameter. Estimated right atrial pressure is 10-15 mmHg.    Pericardium  Trivial pericardial effusion is present.     Compared to Previous Study  Previous study not available for comparison.     IVSd: 0.95 cm  LVIDd: 7.2 cm  LVIDs: 6.7 cm  LVPWd: 0.67 cm  FS: 6.9 %  EDV(Teich): 274.3 ml  ESV(Teich): 233.4 ml  LV mass(C)d: 263.3 grams  Ao root diam: 3.3 cm  asc Aorta Diam: 3.1 cm  LVOT diam: 2.1 cm  LVOT area: 3.6 cm2  LA Volume (BP): 82.0 ml  LA Volume Index (BP): 44.6 ml/m2  MV E max jesús: 43.9 cm/sec  MV A max jesús: 19.7  cm/sec  MV E/A: 2.2  MV dec time: 0.29 sec  PA acc time: 0.09 sec  TR max jesús: 220.4 cm/sec  TR max P.4 mmHg  Lateral E/e': 7.9  Medial E/e': 10.9    CORONARY ANGIOGRAPHY: 2017  No significant coronary artery disease.  Non-ischemic cardiomyopathy.    MR CARDIAC W CONTRAST: 2017   IMPRESSION:   Severe non-ischemic cardiomyopathy with biventricular involvement. LVEF of 9% and RVEF of 18%. Extensive fibrosis that is suggestive of  an inflammatory etiology to the cardiomyopathy such as a fulminant myocarditis. LV thinning is suggestive of a subacute or chronic  timeline for the cardiomyopathy (i.e., the myocarditis does not appear to be acute).       MR CARDIAC W CONTRAST: 2017  IMPRESSION:  1. Severely dilated left ventricle with severely reduced global function with a calculated ejection fraction of 12 %.  2. Moderately dilated right ventricle with moderately reduced global function with a calculated ejection fraction of 30%.   3. There is mild mitral and tricuspid regurgitation.  4. The left atrium is severely dilated. The right atrium is mildly dilated.  5. On delayed enhancement imaging, there is extensive hyperenhancement in multiple non-ischemic patterns: transmural,  mid-myocardial, subendocardial, and epicardial. 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 The extent and  distribution of hyperenhancement are unchanged from the prior study.  6. Compared with the prior study dated 2017, right ventricular function has improved.        ASSESSMENT AND PLAN:  1. Congestive heart failure, stage C, NYHA class I, LVEF 12%, RVEF 30%, non-ischemic etiology. I have reviewed all imaging and available test results.  2. Euvolemic by clinical assessment.  3. Recommend:  -- He will increase his coreg to 12.5 mg BID when able.  -- Continue Lisinopril 5 mg PO BID and spironolactone 12.5 mg PO QD.  -- Discussed with him, at length, the natural course of his disease and possible advanced  therapies. He doesn't need any advanced therapies at this time most certainly, but we have started the conversation today. He is open to both LVAD and OHT if and when he needs them. We will initiate preliminary work up at his convenience.  -- RTC in 6 months. Will uptitrate lisinopril at that time.    Gallo Bernabe MD  Cardiovascular Disease Fellow  Pager: [phone]      Attending Attestation:  Patient seen and examined by me with the Fellow. I have performed all pertinent elements of the physical examination and reviewed the note above. I have reviewed pertinent laboratory, echocardiographic, imaging, and cardiac catheterization results. I agree with the plan of care as described in this note.    Channing Cardona MD, PhD

## 2017-10-27 NOTE — PROGRESS NOTES
"CARDIOLOGY NEW OFFICE VISIT    HPI: Akshat Fragoso is a 52 year old male who is seen in clinic today accompanied by his wife. He is here for a 6 month follow up for his NICM. Briefly, he was recently diagnosed with nonischemic cardiomyopathy and LVEF of 9% and RVEF of 18% by CMRI (1/2017). A follow up CMRI (4/2017) shows an LVEF of 12% and RVEF of 30% despite subjective improvement in the degree of limitations with ADLs, and he underwent an ICD placement after that.     On his last visit he was initiated on GDMT with Coreg (6.25 mg BID, to be gradually increased to 12.5 mg BID, in increments of 3.125 mg), Lisinopril 5 mg BID and Aldactone 12.5 mg. He says whenever he increases his coreg dose, for a couple of weeks he feels tired, fatigued, wiped out and dizzy. He also notices occasional visual disturbances that are transient. However once he has been on a regimen for 2-3 weeks, his symptoms subside. He has been rather cautious in up titrating his Coreg and is currently at 12.5 mg in the morning and 9.375 in the evening.     He denies chest discomfort, dyspnea, PND, orthopnea, pedal edema, early satiety or palpitations. He says compared to 6 months ago overall he is feeling \"100% better\".      PAST MEDICAL HISTORY:  Past Medical History:   Diagnosis Date     CARDIOVASCULAR SCREENING; LDL GOAL LESS THAN 160 5/9/2010     Pneumonia 11/2011       CURRENT MEDICATIONS:  Current Outpatient Prescriptions   Medication Sig Dispense Refill     furosemide (LASIX) 20 MG tablet Take 1 tablet (20 mg) by mouth daily as needed Only take if weight up by 3lbs 90 tablet 3     oxyCODONE-acetaminophen (PERCOCET) 5-325 MG per tablet Take 1 tablet by mouth 2 times daily as needed for moderate to severe pain or pain 1-2 tablets per day PRN severe pain. No additional refills will be granted after this. 30 tablet 0     oxyCODONE-acetaminophen (PERCOCET) 5-325 MG per tablet 4 Tablets per day prn acute pain and wean down as able to 1-2 tablets " "per day/wean off if able. 60 tablet 0     aspirin 81 MG tablet Take 81 mg by mouth daily       lisinopril (PRINIVIL/ZESTRIL) 5 MG tablet Take 1 tablet (5 mg) by mouth 2 times daily 180 tablet 3     spironolactone (ALDACTONE) 25 MG tablet Take 0.5 tablets (12.5 mg) by mouth daily 45 tablet 3     carvedilol (COREG) 6.25 MG tablet Take 1 tablet (6.25 mg) by mouth 2 times daily (with meals) 90 tablet 3     Blood Pressure Monitor KIT 1 kit daily 1 kit 0       PAST SURGICAL HISTORY:  Past Surgical History:   Procedure Laterality Date     None         ALLERGIES  No known allergies    FAMILY HX:  No family history on file.    SOCIAL HX:  Social History     Social History     Marital status:      Spouse name: Cheryl     Number of children: 2     Years of education: N/A     Occupational History     Construction  Self     Social History Main Topics     Smoking status: Former Smoker     Packs/day: 0.50     Years: 15.00     Types: Cigarettes     Quit date: 12/1/2016     Smokeless tobacco: Former User     Quit date: 10/24/2011     Alcohol use No     Drug use: No     Sexual activity: Yes     Partners: Female     Birth control/ protection: Condom     Other Topics Concern     Parent/Sibling W/ Cabg, Mi Or Angioplasty Before 65f 55m? Yes     both parents had stints placed      Social History Narrative       ROS:  Constitutional: No fever, chills, or sweats. No weight gain/loss.   ENT: No visual disturbance, ear ache, epistaxis, sore throat.   Allergies/Immunologic: Negative.   Respiratory: No cough, hemoptysis.   Cardiovascular: As per HPI.   GI: No nausea, vomiting, hematemesis, melena, or hematochezia.   : No urinary frequency, dysuria, or hematuria.   Integument: Negative.   Psychiatric: Negative.   Neuro: Negative.   Endocrinology: Negative.   Musculoskeletal: No myalgia.    VITAL SIGNS:  /78 (BP Location: Right arm, Patient Position: Chair, Cuff Size: Adult Regular)  Pulse 80  Ht 1.702 m (5' 7\")  Wt 77.9 kg " "(171 lb 12.8 oz)  SpO2 95%  BMI 26.91 kg/m2  Body mass index is 26.91 kg/(m^2).  Wt Readings from Last 2 Encounters:   10/27/17 77.9 kg (171 lb 12.8 oz)   17 76.2 kg (168 lb)       PHYSICAL EXAM  /78 (BP Location: Right arm, Patient Position: Chair, Cuff Size: Adult Regular)  Pulse 80  Ht 1.702 m (5' 7\")  Wt 77.9 kg (171 lb 12.8 oz)  SpO2 95%  BMI 26.91 kg/m2  GEN: aao x 3, NAD  Neck: No JVD elevation  LUNGS: No wheezing or rales  HEART: S1S2 audible, no murmurs or rubs. Regular rhythm  ABDOMEN: Soft, nt, nd. +BS  EXTREMITIES: Warm calves, +DPs, no LE edema  NEURO: aao x 3, no focal deficits    LABS    Lab Results   Component Value Date    WBC 11.9 2017     Lab Results   Component Value Date    RBC 5.34 2017     Lab Results   Component Value Date    HGB 16.4 2017     Lab Results   Component Value Date    HCT 51.1 2017     No components found for: MCT  Lab Results   Component Value Date    MCV 96 2017     Lab Results   Component Value Date    MCH 30.7 2017     Lab Results   Component Value Date    MCHC 32.1 2017     Lab Results   Component Value Date    RDW 13.6 2017     Lab Results   Component Value Date     2017      Recent Labs   Lab Test  17   0655  17   1735   NA  139  138   POTASSIUM  4.2  4.2   CHLORIDE  103  105   CO2  31  21   ANIONGAP  5  12   GLC  91  90   BUN  12  16   CR  0.85  0.94   TONY  8.6  8.5     Recent Labs   Lab Test  01/10/17   0718   CHOL  124   HDL  39*   LDL  64   TRIG  103   NHDL  84     EC2017  Sinus tachycardia with PACs. Incomplete LBBB.    ECHOCARDIOGRAM: 2017  Severe left ventricular dilation is present. Severe diffuse hypokinesis is present. The Ejection Fraction is estimated at 10-15%. Traced at 13%.  Mild right ventricular dilation is present. Global right ventricular function is mildly reduced.  Left ventricular diastolic function is indeterminate.  Dilation of the inferior vena " cava is present with abnormal respiratory variation in diameter.     Left Ventricle  Severe left ventricular dilation is present. Severely (EF <30%) reduced left ventricular function is present. The Ejection Fraction is estimated at 10-15%. Left ventricular diastolic function is indeterminate. Severe diffuse hypokinesis is present.     Right Ventricle  Mild right ventricular dilation is present. Global right ventricular function  is mildly reduced.  Atria  Moderate left atrial enlargement is present. Mild right atrial enlargement is  present.     Mitral Valve  Mild mitral annular calcification is present. Trace to mild mitral  insufficiency is present.     Aortic Valve  Aortic valve is normal in structure and function.     Tricuspid Valve  The tricuspid valve is normal. The right ventricular systolic pressure is approximated at 19.4 mmHg plus the right atrial pressure. Trace tricuspid  insufficiency is present.     Pulmonic Valve  The pulmonic valve is normal.     Vessels  The thoracic aorta is normal. Dilation of the inferior vena cava is present with abnormal respiratory variation in diameter. Estimated right atrial pressure is 10-15 mmHg.    Pericardium  Trivial pericardial effusion is present.     Compared to Previous Study  Previous study not available for comparison.     IVSd: 0.95 cm  LVIDd: 7.2 cm  LVIDs: 6.7 cm  LVPWd: 0.67 cm  FS: 6.9 %  EDV(Teich): 274.3 ml  ESV(Teich): 233.4 ml  LV mass(C)d: 263.3 grams  Ao root diam: 3.3 cm  asc Aorta Diam: 3.1 cm  LVOT diam: 2.1 cm  LVOT area: 3.6 cm2  LA Volume (BP): 82.0 ml  LA Volume Index (BP): 44.6 ml/m2  MV E max jesús: 43.9 cm/sec  MV A max jesús: 19.7 cm/sec  MV E/A: 2.2  MV dec time: 0.29 sec  PA acc time: 0.09 sec  TR max jesús: 220.4 cm/sec  TR max P.4 mmHg  Lateral E/e': 7.9  Medial E/e': 10.9    CORONARY ANGIOGRAPHY: 2017  No significant coronary artery disease.  Non-ischemic cardiomyopathy.    MR CARDIAC W CONTRAST: 2017   IMPRESSION:   Severe  non-ischemic cardiomyopathy with biventricular involvement. LVEF of 9% and RVEF of 18%. Extensive fibrosis that is suggestive of  an inflammatory etiology to the cardiomyopathy such as a fulminant myocarditis. LV thinning is suggestive of a subacute or chronic  timeline for the cardiomyopathy (i.e., the myocarditis does not appear to be acute).       MR CARDIAC W CONTRAST: 4/17/2017  IMPRESSION:  1. Severely dilated left ventricle with severely reduced global function with a calculated ejection fraction of 12 %.  2. Moderately dilated right ventricle with moderately reduced global function with a calculated ejection fraction of 30%.   3. There is mild mitral and tricuspid regurgitation.  4. The left atrium is severely dilated. The right atrium is mildly dilated.  5. On delayed enhancement imaging, there is extensive hyperenhancement in multiple non-ischemic patterns: transmural,  mid-myocardial, subendocardial, and epicardial. 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 The extent and  distribution of hyperenhancement are unchanged from the prior study.  6. Compared with the prior study dated 1/11/2017, right ventricular function has improved.        ASSESSMENT AND PLAN:  1. Congestive heart failure, stage C, NYHA class I, LVEF 12%, RVEF 30%, non-ischemic etiology. I have reviewed all imaging and available test results.  2. Euvolemic by clinical assessment.  3. Recommend:  -- He will increase his coreg to 12.5 mg BID when able.  -- Continue Lisinopril 5 mg PO BID and spironolactone 12.5 mg PO QD.  -- Discussed with him, at length, the natural course of his disease and possible advanced therapies. He doesn't need any advanced therapies at this time most certainly, but we have started the conversation today. He is open to both LVAD and OHT if and when he needs them. We will initiate preliminary work up at his convenience.  -- RTC in 6 months. Will uptitrate lisinopril at that time.    Gallo Bernabe,  MD  Cardiovascular Disease Fellow  Pager: [phone]      Attending Attestation:  Patient seen and examined by me with the Fellow. I have performed all pertinent elements of the physical examination and reviewed the note above. I have reviewed pertinent laboratory, echocardiographic, imaging, and cardiac catheterization results. I agree with the plan of care as described in this note.    Channing Cardona MD, PhD

## 2017-10-30 ENCOUNTER — DOCUMENTATION ONLY (OUTPATIENT)
Dept: TRANSPLANT | Facility: CLINIC | Age: 52
End: 2017-10-30

## 2017-11-14 ENCOUNTER — TELEPHONE (OUTPATIENT)
Dept: FAMILY MEDICINE | Facility: CLINIC | Age: 52
End: 2017-11-14

## 2017-11-14 NOTE — TELEPHONE ENCOUNTER
Forms received from [employer]. This was completed and signed by CO.     I faxed this at 8:55 this morning to [phone]. Copy was also made for the chart.       Kang Valladares MA

## 2017-11-21 ENCOUNTER — OFFICE VISIT (OUTPATIENT)
Dept: FAMILY MEDICINE | Facility: CLINIC | Age: 52
End: 2017-11-21
Payer: COMMERCIAL

## 2017-11-21 ENCOUNTER — RADIANT APPOINTMENT (OUTPATIENT)
Dept: GENERAL RADIOLOGY | Facility: CLINIC | Age: 52
End: 2017-11-21
Attending: NURSE PRACTITIONER
Payer: COMMERCIAL

## 2017-11-21 VITALS
SYSTOLIC BLOOD PRESSURE: 136 MMHG | DIASTOLIC BLOOD PRESSURE: 78 MMHG | TEMPERATURE: 98.9 F | HEART RATE: 83 BPM | OXYGEN SATURATION: 96 % | RESPIRATION RATE: 20 BRPM | BODY MASS INDEX: 26.31 KG/M2 | WEIGHT: 168 LBS

## 2017-11-21 DIAGNOSIS — M25.512 ACUTE PAIN OF LEFT SHOULDER: Primary | ICD-10-CM

## 2017-11-21 DIAGNOSIS — M25.512 ACUTE PAIN OF LEFT SHOULDER: ICD-10-CM

## 2017-11-21 PROCEDURE — 73030 X-RAY EXAM OF SHOULDER: CPT | Mod: LT

## 2017-11-21 PROCEDURE — 99213 OFFICE O/P EST LOW 20 MIN: CPT | Performed by: NURSE PRACTITIONER

## 2017-11-21 RX ORDER — OXYCODONE AND ACETAMINOPHEN 5; 325 MG/1; MG/1
TABLET ORAL
Qty: 80 TABLET | Refills: 0 | Status: SHIPPED | OUTPATIENT
Start: 2017-11-21 | End: 2017-12-19

## 2017-11-21 NOTE — NURSING NOTE
"Chief Complaint   Patient presents with     Shoulder       Initial /78  Pulse 83  Temp 98.9  F (37.2  C) (Oral)  Resp 20  Wt 168 lb (76.2 kg)  SpO2 96%  BMI 26.31 kg/m2 Estimated body mass index is 26.31 kg/(m^2) as calculated from the following:    Height as of 10/27/17: 5' 7\" (1.702 m).    Weight as of this encounter: 168 lb (76.2 kg).  Medication Reconciliation: complete       Kang Valladares MA       "

## 2017-11-21 NOTE — PATIENT INSTRUCTIONS
For your shoulder pain, take the percocet sparingly/only as needed for severe pain.  Stop the percocet as soon as possible.  Follow up with an orthopedist ASAP for additional treatment options.

## 2017-11-21 NOTE — MR AVS SNAPSHOT
After Visit Summary   11/21/2017    Akshat Fragoso    MRN: [id number]           Patient Information     Date Of Birth          1965        Visit Information        Provider Department      11/21/2017 1:40 PM Denise White APRN CNP [facility]        Today's Diagnoses     Acute pain of left shoulder    -  1      Care Instructions    For your shoulder pain, take the percocet sparingly/only as needed for severe pain.  Stop the percocet as soon as possible.  Follow up with an orthopedist ASAP for additional treatment options.            Follow-ups after your visit        Additional Services     ORTHO  REFERRAL       [facility] is referring you to the Orthopedic  Services at [facility] [place].       The  Representative will assist you in the coordination of your Orthopedic and Musculoskeletal Care as prescribed by your physician.    The  Representative will call you within 1 business day to help schedule your appointment, or you may contact the  Representative at:    All areas ~ [phone]     Type of Referral : Non Surgical       Timeframe requested: Routine    Coverage of these services is subject to the terms and limitations of your health insurance plan.  Please call member services at your health plan with any benefit or coverage questions.      If X-rays, CT or MRI's have been performed, please contact the facility where they were done to arrange for , prior to your scheduled appointment.  Please bring this referral request to your appointment and present it to your specialist.                  Who to contact     If you have questions or need follow up information about today's clinic visit or your schedule please contact [facility] directly at [phone].  Normal or non-critical lab and imaging results will be communicated to you by MyChart, letter or phone  within 4 business days after the clinic has received the results. If you do not hear from us within 7 days, please contact the clinic through [employer] or phone. If you have a critical or abnormal lab result, we will notify you by phone as soon as possible.  Submit refill requests through [employer] or call your pharmacy and they will forward the refill request to us. Please allow 3 business days for your refill to be completed.          Additional Information About Your Visit        [employer] Information     [employer] gives you secure access to your electronic health record. If you see a primary care provider, you can also send messages to your care team and make appointments. If you have questions, please call your primary care clinic.  If you do not have a primary care provider, please call [phone] and they will assist you.        Care EveryWhere ID     This is your Care EveryWhere ID. This could be used by other organizations to access your [place] medical records  [id number]        Your Vitals Were     Pulse Temperature Respirations Pulse Oximetry BMI (Body Mass Index)       83 98.9  F (37.2  C) (Oral) 20 96% 26.31 kg/m2        Blood Pressure from Last 3 Encounters:   11/21/17 136/78   10/27/17 123/78   09/20/17 126/80    Weight from Last 3 Encounters:   11/21/17 168 lb (76.2 kg)   10/27/17 171 lb 12.8 oz (77.9 kg)   09/20/17 168 lb (76.2 kg)              We Performed the Following     ORTHO  REFERRAL          Today's Medication Changes          These changes are accurate as of: 11/21/17  2:51 PM.  If you have any questions, ask your nurse or doctor.               These medicines have changed or have updated prescriptions.        Dose/Directions    oxyCODONE-acetaminophen 5-325 MG per tablet   Commonly known as:  PERCOCET   This may have changed:  additional instructions   Used for:  Acute pain of left shoulder   Changed by:  Denise White APRN CNP        3-4 tablets per day for 2 weeks and  then 1-2 tablets per day for 2 weeks.   Quantity:  80 tablet   Refills:  0            Where to get your medicines      Some of these will need a paper prescription and others can be bought over the counter.  Ask your nurse if you have questions.     Bring a paper prescription for each of these medications     oxyCODONE-acetaminophen 5-325 MG per tablet                Primary Care Provider Office Phone # Fax #    Denise White, VERONICA [phone] [address] [place] [address]        Equal Access to Services     [facility] VINITA : Hadii aad ku hadasho Soomaali, waaxda luqadaha, qaybta kaalmada adeegyada, waxniharika huerta haysonido sanchez . So [facility] [phone].    ATENCIÓN: Si habla español, tiene a whipple disposición servicios gratuitos de asistencia lingüística. [facility] [phone].    We comply with applicable federal civil rights laws and [place] laws. We do not discriminate on the basis of race, color, national origin, age, disability, sex, sexual orientation, or gender identity.            Thank you!     Thank you for choosing [facility]  for your care. Our goal is always to provide you with excellent care. Hearing back from our patients is one way we can continue to improve our services. Please take a few minutes to complete the written survey that you may receive in the mail after your visit with us. Thank you!             Your Updated Medication List - Protect others around you: Learn how to safely use, store and throw away your medicines at www.disposemymeds.org.          This list is accurate as of: 11/21/17  2:51 PM.  Always use your most recent med list.                   Brand Name Dispense Instructions for use Diagnosis    aspirin 81 MG tablet      Take 81 mg by mouth daily        Blood Pressure Monitor Kit     1 kit    1 kit daily    Chronic congestive heart failure, unspecified congestive heart failure type (H)       carvedilol 6.25  MG tablet    COREG    90 tablet    Take 1 tablet (6.25 mg) by mouth 2 times daily (with meals)    Chronic congestive heart failure, unspecified congestive heart failure type (H)       furosemide 20 MG tablet    LASIX    90 tablet    Take 1 tablet (20 mg) by mouth daily as needed Only take if weight up by 3lbs    JOHNSON (dyspnea on exertion), Chronic congestive heart failure, unspecified congestive heart failure type (H), Acute on chronic systolic congestive heart failure (H)       lisinopril 5 MG tablet    PRINIVIL/ZESTRIL    180 tablet    Take 1 tablet (5 mg) by mouth 2 times daily    Chronic congestive heart failure, unspecified congestive heart failure type (H)       oxyCODONE-acetaminophen 5-325 MG per tablet    PERCOCET    80 tablet    3-4 tablets per day for 2 weeks and then 1-2 tablets per day for 2 weeks.    Acute pain of left shoulder       spironolactone 25 MG tablet    ALDACTONE    45 tablet    Take 0.5 tablets (12.5 mg) by mouth daily    Chronic congestive heart failure, unspecified congestive heart failure type (H)

## 2017-11-21 NOTE — PROGRESS NOTES
SUBJECTIVE:   Akshat Fragoso is a 52 year old male who presents to clinic today for the following health issues:  Chief Complaint   Patient presents with     Shoulder         Musculoskeletal problem/pain    Duration: 5 days ago     Description  Akshat reports that 5 days ago he was moving a hunting ladder stand.  The stand started to shift and it fell.  The strap yanked his left shoulder straight up and dislocated his shoulder.  A friend (a ) then reduced his left shoulder.    Location: left shoulder     Intensity:  moderate, severe    Accompanying signs and symptoms: weakness of shoulder  and redness    History  Previous similar problem: no   Previous evaluation:  none    Precipitating or alleviating factors:  Trauma or overuse: YES- fall from deer tree stand   Aggravating factors include: lifting arm- using it at all.     Therapies tried and outcome: rest/inactivity, ice and Ibuprofen        Problem list and histories reviewed & adjusted, as indicated.  Additional history: as documented    Patient Active Problem List   Diagnosis     CARDIOVASCULAR SCREENING; LDL GOAL LESS THAN 160     Acute right lumbar radiculopathy     Personal history of smoking     CHF (congestive heart failure) (H)     Acute systolic heart failure (H)     Cardiomyopathy, unspecified     Cardiomyopathy, nonischemic (H)     Pulmonary nodules, next CT due 1/2018     Personal history of tobacco use, presenting hazards to health     ICD (implantable cardioverter-defibrillator) in place- [employer], single chamber- NOT dependent     Past Surgical History:   Procedure Laterality Date     None         Social History   Substance Use Topics     Smoking status: Former Smoker     Packs/day: 0.50     Years: 15.00     Types: Cigarettes     Quit date: 12/1/2016     Smokeless tobacco: Former User     Quit date: 10/24/2011     Alcohol use No     History reviewed. No pertinent family history.      Current Outpatient Prescriptions   Medication Sig  Dispense Refill     furosemide (LASIX) 20 MG tablet Take 1 tablet (20 mg) by mouth daily as needed Only take if weight up by 3lbs 90 tablet 3     oxyCODONE-acetaminophen (PERCOCET) 5-325 MG per tablet Take 1 tablet by mouth 2 times daily as needed for moderate to severe pain or pain 1-2 tablets per day PRN severe pain. No additional refills will be granted after this. 30 tablet 0     oxyCODONE-acetaminophen (PERCOCET) 5-325 MG per tablet 4 Tablets per day prn acute pain and wean down as able to 1-2 tablets per day/wean off if able. 60 tablet 0     aspirin 81 MG tablet Take 81 mg by mouth daily       lisinopril (PRINIVIL/ZESTRIL) 5 MG tablet Take 1 tablet (5 mg) by mouth 2 times daily 180 tablet 3     spironolactone (ALDACTONE) 25 MG tablet Take 0.5 tablets (12.5 mg) by mouth daily 45 tablet 3     carvedilol (COREG) 6.25 MG tablet Take 1 tablet (6.25 mg) by mouth 2 times daily (with meals) 90 tablet 3     Blood Pressure Monitor KIT 1 kit daily 1 kit 0     Allergies   Allergen Reactions     No Known Allergies      Recent Labs   Lab Test  06/08/17   0739  01/12/17   0655   01/10/17   0718   01/09/17   1001   LDL   --    --    --   64   --    --    HDL   --    --    --   39*   --    --    TRIG   --    --    --   103   --    --    ALT   --    --    --    --    --   111*   CR  0.90  0.85   < >  0.90   < >  0.85   GFRESTIMATED  90  >90  Non  GFR Calc     < >  90   < >  >90  Non  GFR Calc     GFRESTBLACK  >90   GFR Calc    >90   GFR Calc     < >  >90   GFR Calc     < >  >90   GFR Calc     POTASSIUM  4.2  4.2   < >  4.4   --   4.9   TSH   --    --    --    --    --   1.34    < > = values in this interval not displayed.      BP Readings from Last 3 Encounters:   11/21/17 136/78   10/27/17 123/78   09/20/17 126/80    Wt Readings from Last 3 Encounters:   11/21/17 168 lb (76.2 kg)   10/27/17 171 lb 12.8 oz (77.9 kg)   09/20/17 168 lb  (76.2 kg)            Labs reviewed in EPIC    Reviewed and updated as needed this visit by clinical staff     Reviewed and updated as needed this visit by Provider         ROS:  Constitutional, HEENT, cardiovascular, pulmonary, gi and gu systems are negative, except as otherwise noted.    OBJECTIVE:   /78  Pulse 83  Temp 98.9  F (37.2  C) (Oral)  Resp 20  Wt 168 lb (76.2 kg)  SpO2 96%  BMI 26.31 kg/m2  Body mass index is 26.31 kg/(m^2).  Constitutional: healthy, alert and mild distress. He is walking holding/supporting his left arm.  MS: left clavicle/shoulder without deformity.  Range of motion is globally limited.   CWM as is intact his left hand.   Psychiatric: mentation appears normal and affect normal/bright     shoulder x-ray left :  Negative for fracture     ASSESSMENT/PLAN:     (M25.512) Acute pain of left shoulder  (primary encounter diagnosis)  Comment: Acute   Plan: XR Shoulder Left G/E 3 Views, ORTHO          REFERRAL, oxyCODONE-acetaminophen (PERCOCET)         5-325 MG per tablet        I did approve a prescription for the Percocet today. I feel this is a very generous prescription. He sees the medication sparingly/only as needed.    I did discuss and review with Akshat that he has a history of liking to take Percocet for his concerns. I discussed and reviewed again the addictive properties, controlled substance policy, and the importance of stopping this medication as soon as possible. He agrees and understands.    Patient Instructions   For your shoulder pain, take the percocet sparingly/only as needed for severe pain.  Stop the percocet as soon as possible.  Follow up with an orthopedist ASAP for additional treatment options.          VERONICA Doran [facility]

## 2017-12-05 ENCOUNTER — TELEPHONE (OUTPATIENT)
Dept: TRANSPLANT | Facility: CLINIC | Age: 52
End: 2017-12-05

## 2017-12-16 ENCOUNTER — ALLIED HEALTH/NURSE VISIT (OUTPATIENT)
Dept: CARDIOLOGY | Facility: CLINIC | Age: 52
End: 2017-12-16
Attending: INTERNAL MEDICINE
Payer: COMMERCIAL

## 2017-12-16 ENCOUNTER — TELEPHONE (OUTPATIENT)
Dept: CARDIOLOGY | Facility: CLINIC | Age: 52
End: 2017-12-16

## 2017-12-16 DIAGNOSIS — I42.8 CARDIOMYOPATHY, NONISCHEMIC (H): Primary | ICD-10-CM

## 2017-12-16 PROCEDURE — 93296 REM INTERROG EVL PM/IDS: CPT | Mod: ZF

## 2017-12-16 PROCEDURE — 93295 DEV INTERROG REMOTE 1/2/MLT: CPT | Performed by: INTERNAL MEDICINE

## 2017-12-16 NOTE — TELEPHONE ENCOUNTER
Received a page from patient's wife Landry who reports her  has been feeling dizzy and experiencing some palpitations this afternoon.  Remote ICD transmission received and reviewed.  Device transmission sent per MD orders.  Patient has a [employer] single lead ICD.  Normal ICD function.  No arrythmias recorded this afternoon.  3 NSVT episodes recorded that appear to be ST-SVT - 171-185 bpm, 4-24 seconds in duration.  The last stored episode was recorded on 11/29/17.  Presenting EGM = VS @ 80 bpm.   = 0%. Estimated battery longevity to NEETA = 12 years.  Patient's wife notified of interrogation results.  Patient and his wife instructed to call device RN with further questions or concerns.  Patient instructed to go to the ER with increase in symptoms.      Remote ICD transmission

## 2017-12-16 NOTE — MR AVS SNAPSHOT
After Visit Summary   12/16/2017    Akshat Fragoso    MRN: [id number]           Patient Information     Date Of Birth          1965        Visit Information        Provider Department      12/16/2017 6:00 AM  ICD [facility]        Today's Diagnoses     Cardiomyopathy, nonischemic (H)    -  1       Follow-ups after your visit        Your next 10 appointments already scheduled     Dec 19, 2017  7:20 AM CST   SHORT with Diane Emery MD   [facility]    [address] [place] [id number] [phone]              Who to contact     If you have questions or need follow up information about today's clinic visit or your schedule please contact [facility] directly at [phone].  Normal or non-critical lab and imaging results will be communicated to you by MyChart, letter or phone within 4 business days after the clinic has received the results. If you do not hear from us within 7 days, please contact the clinic through MyChart or phone. If you have a critical or abnormal lab result, we will notify you by phone as soon as possible.  Submit refill requests through [employer] or call your pharmacy and they will forward the refill request to us. Please allow 3 business days for your refill to be completed.          Additional Information About Your Visit        MyChart Information     [employer] gives you secure access to your electronic health record. If you see a primary care provider, you can also send messages to your care team and make appointments. If you have questions, please call your primary care clinic.  If you do not have a primary care provider, please call [phone] and they will assist you.        Care EveryWhere ID     This is your Care EveryWhere ID. This could be used by other organizations to access your [place] medical records  [id number]         Blood Pressure from Last 3  Encounters:   11/21/17 136/78   10/27/17 123/78   09/20/17 126/80    Weight from Last 3 Encounters:   11/21/17 76.2 kg (168 lb)   10/27/17 77.9 kg (171 lb 12.8 oz)   09/20/17 76.2 kg (168 lb)              We Performed the Following     INTERROGATION DEVICE EVAL REMOTE, PACER/ICD        Primary Care Provider Office Phone # Fax #    Denise White, VERONICA [phone] [address] [place] [address]        Equal Access to Services     [facility] : Hadii aad ku hadasho Soomaali, waaxda luqadaha, qaybta kaalmada adeegyada, kieran huerta haysonido sanchez . So [facility] [phone].    ATENCIÓN: Si habla español, tiene a whipple disposición servicios gratuitos de asistencia lingüística. Llame al [phone].    We comply with applicable federal civil rights laws and [place] laws. We do not discriminate on the basis of race, color, national origin, age, disability, sex, sexual orientation, or gender identity.            Thank you!     Thank you for choosing [facility]  for your care. Our goal is always to provide you with excellent care. Hearing back from our patients is one way we can continue to improve our services. Please take a few minutes to complete the written survey that you may receive in the mail after your visit with us. Thank you!             Your Updated Medication List - Protect others around you: Learn how to safely use, store and throw away your medicines at www.disposemymeds.org.          This list is accurate as of: 12/16/17 11:59 PM.  Always use your most recent med list.                   Brand Name Dispense Instructions for use Diagnosis    aspirin 81 MG tablet      Take 81 mg by mouth daily        Blood Pressure Monitor Kit     1 kit    1 kit daily    Chronic congestive heart failure, unspecified congestive heart failure type (H)       carvedilol 6.25 MG tablet    COREG    90 tablet    Take 1 tablet (6.25 mg) by mouth 2 times daily (with meals)     Chronic congestive heart failure, unspecified congestive heart failure type (H)       furosemide 20 MG tablet    LASIX    90 tablet    Take 1 tablet (20 mg) by mouth daily as needed Only take if weight up by 3lbs    JOHNSON (dyspnea on exertion), Chronic congestive heart failure, unspecified congestive heart failure type (H), Acute on chronic systolic congestive heart failure (H)       lisinopril 5 MG tablet    PRINIVIL/ZESTRIL    180 tablet    Take 1 tablet (5 mg) by mouth 2 times daily    Chronic congestive heart failure, unspecified congestive heart failure type (H)       oxyCODONE-acetaminophen 5-325 MG per tablet    PERCOCET    80 tablet    3-4 tablets per day for 2 weeks and then 1-2 tablets per day for 2 weeks.    Acute pain of left shoulder       spironolactone 25 MG tablet    ALDACTONE    45 tablet    Take 0.5 tablets (12.5 mg) by mouth daily    Chronic congestive heart failure, unspecified congestive heart failure type (H)

## 2017-12-18 NOTE — PROGRESS NOTES
Received a page from patient's wife Landry who reports her  has been feeling dizzy and experiencing some palpitations this afternoon.  Remote ICD transmission received and reviewed.  Device transmission sent per MD orders.  Patient has a [employer]Visual single lead ICD.  Normal ICD function.  No arrythmias recorded this afternoon.  3 NSVT episodes recorded that appear to be ST-SVT - 171-185 bpm, 4-24 seconds in duration.  The last stored episode was recorded on 11/29/17.  Presenting EGM = VS @ 80 bpm.   = 0%. Estimated battery longevity to NEETA = 12 years.  Patient's wife notified of interrogation results.  Patient and his wife instructed to call device RN with further questions or concerns.  Patient instructed to go to the ER with increase in symptoms.       Remote ICD transmission

## 2017-12-19 ENCOUNTER — OFFICE VISIT (OUTPATIENT)
Dept: FAMILY MEDICINE | Facility: CLINIC | Age: 52
End: 2017-12-19
Payer: COMMERCIAL

## 2017-12-19 VITALS
OXYGEN SATURATION: 96 % | BODY MASS INDEX: 26.66 KG/M2 | WEIGHT: 170.25 LBS | HEART RATE: 91 BPM | SYSTOLIC BLOOD PRESSURE: 143 MMHG | DIASTOLIC BLOOD PRESSURE: 80 MMHG | RESPIRATION RATE: 16 BRPM | TEMPERATURE: 95.3 F

## 2017-12-19 DIAGNOSIS — M25.512 ACUTE PAIN OF LEFT SHOULDER: Primary | ICD-10-CM

## 2017-12-19 PROCEDURE — 99213 OFFICE O/P EST LOW 20 MIN: CPT | Performed by: FAMILY MEDICINE

## 2017-12-19 RX ORDER — OXYCODONE AND ACETAMINOPHEN 5; 325 MG/1; MG/1
TABLET ORAL
Qty: 60 TABLET | Refills: 0 | Status: SHIPPED | OUTPATIENT
Start: 2017-12-19 | End: 2018-01-18

## 2017-12-19 NOTE — PROGRESS NOTES
SUBJECTIVE:   Akshat Fragoso is a 52 year old male who presents to clinic today for the following health issues:    1. Follow up Left shoulder Pain,  Current status:  Shoulder pain is worst than before.     Acute injury to LEFT shoulder mid-November when fell off deer stand and hung from harness x 30 minutes before could be helped down.  LEFT shoulder dislocated- friend popped it back in-- has been sore and with restricted ROM ever since.  Saw PCP 11-.  Xrays negative.  Was referred to ORTHO but never went.  Would like to start PT.  Unable to do MRI secondary to ICD in place with history of nonischemic CM felt secondary to untreated strep waiting for valve replacement and possible heart transplant in the future.    He has no strength and restricted ROM in LEFT shoulder feeling pain at top and anterior shoulder noting it to be burning.  He requests refill of his Percocet today.    He is leaving today for [place] through January 5th for the holidays.    Problem list and histories reviewed & adjusted, as indicated.  Additional history:     Patient Active Problem List   Diagnosis     CARDIOVASCULAR SCREENING; LDL GOAL LESS THAN 160     Acute right lumbar radiculopathy     Personal history of smoking     CHF (congestive heart failure) (H)     Acute systolic heart failure (H)     Cardiomyopathy, unspecified     Cardiomyopathy, nonischemic (H)     Pulmonary nodules, next CT due 1/2018     Personal history of tobacco use, presenting hazards to health     ICD (implantable cardioverter-defibrillator) in place- [employer], single chamber- NOT dependent     Past Surgical History:   Procedure Laterality Date     None         Social History   Substance Use Topics     Smoking status: Former Smoker     Packs/day: 0.50     Years: 15.00     Types: Cigarettes     Quit date: 12/1/2016     Smokeless tobacco: Former User     Quit date: 10/24/2011     Alcohol use No     No family history on file.      Current Outpatient  Prescriptions   Medication Sig Dispense Refill     oxyCODONE-acetaminophen (PERCOCET) 5-325 MG per tablet 1 tablet bid prn shoulder pain 60 tablet 0     furosemide (LASIX) 20 MG tablet Take 1 tablet (20 mg) by mouth daily as needed Only take if weight up by 3lbs 90 tablet 3     aspirin 81 MG tablet Take 81 mg by mouth daily       lisinopril (PRINIVIL/ZESTRIL) 5 MG tablet Take 1 tablet (5 mg) by mouth 2 times daily 180 tablet 3     spironolactone (ALDACTONE) 25 MG tablet Take 0.5 tablets (12.5 mg) by mouth daily 45 tablet 3     carvedilol (COREG) 6.25 MG tablet Take 1 tablet (6.25 mg) by mouth 2 times daily (with meals) 90 tablet 3     Blood Pressure Monitor KIT 1 kit daily 1 kit 0     Allergies   Allergen Reactions     No Known Allergies      BP Readings from Last 3 Encounters:   12/19/17 143/80   11/21/17 136/78   10/27/17 123/78    Wt Readings from Last 3 Encounters:   12/19/17 170 lb 4 oz (77.2 kg)   11/21/17 168 lb (76.2 kg)   10/27/17 171 lb 12.8 oz (77.9 kg)         Reviewed and updated as needed this visit by clinical staff       Reviewed and updated as needed this visit by Provider         ROS:  Constitutional, HEENT, cardiovascular, pulmonary, gi and gu systems are negative, except as otherwise noted.      OBJECTIVE:   /80 (BP Location: Right arm, Patient Position: Sitting, Cuff Size: Adult Regular)  Pulse 91  Temp 95.3  F (35.2  C) (Tympanic)  Resp 16  Wt 170 lb 4 oz (77.2 kg)  SpO2 96%  BMI 26.66 kg/m2  Body mass index is 26.66 kg/(m^2).  GENERAL: healthy, alert and no distress  EYES: Eyes grossly normal to inspection, PERRL and conjunctivae and sclerae normal  NECK: no adenopathy, no asymmetry, masses, or scars and thyroid normal to palpation  MS: LUE hangs guarded at his side.  Unable to raise arm up to flex elbow, can only bring LUE out < 30 degrees in front.  1/5 strength in contrast to RUE.  Pain with palpation over anterior shoulder.  SKIN: no suspicious lesions or rashes  PSYCH:  mentation appears normal, affect normal/bright    Diagnostic Test Results:  none     ASSESSMENT/PLAN:     1. Acute pain of left shoulder    - PHYSICAL THERAPY REFERRAL  - oxyCODONE-acetaminophen (PERCOCET) 5-325 MG per tablet; 1 tablet bid prn shoulder pain  Dispense: 60 tablet; Refill: 0    Patient referred for PT to help maximize ROM and mitigate pain with strengthening.  Advised to begin passive ROM exercises at home until he can be seen to minimize frozen shoulder syndrome which he has had in the past when his ICD was first placed.  He did not FU with ORTHO-- he would like to see how he can do with PT prior to his valve replacement in February 2018.    Percocet #60 refilled today with instructions to use sparingly.  Continue with ICE/HEAT, NSAIDs.    Diane Emery MD  [facility]

## 2017-12-19 NOTE — MR AVS SNAPSHOT
"              After Visit Summary   12/19/2017    Akshat Fragoso    MRN: [id number]           Patient Information     Date Of Birth          1965        Visit Information        Provider Department      12/19/2017 7:20 AM Diane Emery MD [facility]        Today's Diagnoses     Acute pain of left shoulder    -  1       Follow-ups after your visit        Additional Services     PHYSICAL THERAPY REFERRAL       *This therapy referral will be filtered to a centralized scheduling office at [facility] and the patient will receive a call to schedule an appointment at a [facility] location most convenient for them. *     [facility] provides Physical Therapy evaluation and treatment and many specialty services across the [facility] system.  If requesting a specialty program, please choose from the list below.    If you have not heard from the scheduling office within 2 business days, please call [phone] for all locations, with the exception of [place], please call [phone].  Treatment: Evaluation & Treatment  Special Instructions/Modalities: unable to do MRI with ICD in place-- rehab as much as possible  Special Programs:     Please be aware that coverage of these services is subject to the terms and limitations of your health insurance plan.  Call member services at your health plan with any benefit or coverage questions.      **Note to Provider:  If you are referring outside of [facility] for the therapy appointment, please list the name of the location in the \"special instructions\" above, print the referral and give to the patient to schedule the appointment.                  Follow-up notes from your care team     Return if symptoms worsen or fail to improve.      Your next 10 appointments already scheduled     Jan 08, 2018  7:30 AM EDUARDO   (Arrive by 7:15 AM)   SERENA Fang with Leyda Wyatt, PT   [facility] [place] [facility] " [facility] ([facility]  )    [address] [address] [phone]              Who to contact     If you have questions or need follow up information about today's clinic visit or your schedule please contact [facility] directly at [phone].  Normal or non-critical lab and imaging results will be communicated to you by MyChart, letter or phone within 4 business days after the clinic has received the results. If you do not hear from us within 7 days, please contact the clinic through Ometriahart or phone. If you have a critical or abnormal lab result, we will notify you by phone as soon as possible.  Submit refill requests through [employer] or call your pharmacy and they will forward the refill request to us. Please allow 3 business days for your refill to be completed.          Additional Information About Your Visit        Ometriahart Information     [employer] gives you secure access to your electronic health record. If you see a primary care provider, you can also send messages to your care team and make appointments. If you have questions, please call your primary care clinic.  If you do not have a primary care provider, please call [phone] and they will assist you.        Care EveryWhere ID     This is your Care EveryWhere ID. This could be used by other organizations to access your [place] medical records  [id number]        Your Vitals Were     Pulse Temperature Respirations Pulse Oximetry BMI (Body Mass Index)       91 95.3  F (35.2  C) (Tympanic) 16 96% 26.66 kg/m2        Blood Pressure from Last 3 Encounters:   12/19/17 143/80   11/21/17 136/78   10/27/17 123/78    Weight from Last 3 Encounters:   12/19/17 170 lb 4 oz (77.2 kg)   11/21/17 168 lb (76.2 kg)   10/27/17 171 lb 12.8 oz (77.9 kg)              We Performed the Following     PHYSICAL THERAPY REFERRAL          Today's Medication Changes          These changes are accurate as of:  12/19/17  7:48 AM.  If you have any questions, ask your nurse or doctor.               These medicines have changed or have updated prescriptions.        Dose/Directions    oxyCODONE-acetaminophen 5-325 MG per tablet   Commonly known as:  PERCOCET   This may have changed:  additional instructions   Used for:  Acute pain of left shoulder   Changed by:  Diane Emery MD        1 tablet bid prn shoulder pain   Quantity:  60 tablet   Refills:  0            Where to get your medicines      Some of these will need a paper prescription and others can be bought over the counter.  Ask your nurse if you have questions.     Bring a paper prescription for each of these medications     oxyCODONE-acetaminophen 5-325 MG per tablet                Primary Care Provider Office Phone # Fax #    VERONICA Hallman [facility] [phone] [address] [place] [address]        Equal Access to Services     [facility]: Hadii aad ku hadasho Soomaali, waaxda luqadaha, qaybta kaalmada adeegyada, waxay juanain hayaan adeaustin sanchez . So [facility] [phone].    ATENCIÓN: Si habla español, tiene a whipple disposición servicios gratuitos de asistencia lingüística. Huyame al [phone].    We comply with applicable federal civil rights laws and [place] laws. We do not discriminate on the basis of race, color, national origin, age, disability, sex, sexual orientation, or gender identity.            Thank you!     Thank you for choosing [facility]  for your care. Our goal is always to provide you with excellent care. Hearing back from our patients is one way we can continue to improve our services. Please take a few minutes to complete the written survey that you may receive in the mail after your visit with us. Thank you!             Your Updated Medication List - Protect others around you: Learn how to safely use, store and throw away your medicines at  www.disposemymeds.org.          This list is accurate as of: 12/19/17  7:48 AM.  Always use your most recent med list.                   Brand Name Dispense Instructions for use Diagnosis    aspirin 81 MG tablet      Take 81 mg by mouth daily        Blood Pressure Monitor Kit     1 kit    1 kit daily    Chronic congestive heart failure, unspecified congestive heart failure type (H)       carvedilol 6.25 MG tablet    COREG    90 tablet    Take 1 tablet (6.25 mg) by mouth 2 times daily (with meals)    Chronic congestive heart failure, unspecified congestive heart failure type (H)       furosemide 20 MG tablet    LASIX    90 tablet    Take 1 tablet (20 mg) by mouth daily as needed Only take if weight up by 3lbs    JOHNSON (dyspnea on exertion), Chronic congestive heart failure, unspecified congestive heart failure type (H), Acute on chronic systolic congestive heart failure (H)       lisinopril 5 MG tablet    PRINIVIL/ZESTRIL    180 tablet    Take 1 tablet (5 mg) by mouth 2 times daily    Chronic congestive heart failure, unspecified congestive heart failure type (H)       oxyCODONE-acetaminophen 5-325 MG per tablet    PERCOCET    60 tablet    1 tablet bid prn shoulder pain    Acute pain of left shoulder       spironolactone 25 MG tablet    ALDACTONE    45 tablet    Take 0.5 tablets (12.5 mg) by mouth daily    Chronic congestive heart failure, unspecified congestive heart failure type (H)

## 2018-01-14 ENCOUNTER — ALLIED HEALTH/NURSE VISIT (OUTPATIENT)
Dept: CARDIOLOGY | Facility: CLINIC | Age: 53
End: 2018-01-14
Attending: INTERNAL MEDICINE
Payer: COMMERCIAL

## 2018-01-14 ENCOUNTER — TELEPHONE (OUTPATIENT)
Dept: CARDIOLOGY | Facility: CLINIC | Age: 53
End: 2018-01-14

## 2018-01-14 DIAGNOSIS — I42.8 CARDIOMYOPATHY, NONISCHEMIC (H): Primary | ICD-10-CM

## 2018-01-14 PROCEDURE — 93296 REM INTERROG EVL PM/IDS: CPT | Mod: ZF

## 2018-01-14 NOTE — MR AVS SNAPSHOT
After Visit Summary   1/14/2018    Akshat Fragoso    MRN: [id number]           Patient Information     Date Of Birth          1965        Visit Information        Provider Department      1/14/2018 6:00 AM  ICD [facility]        Today's Diagnoses     Cardiomyopathy, nonischemic (H)    -  1       Follow-ups after your visit        Your next 10 appointments already scheduled     Jan 18, 2018  8:00 AM CST   Office Visit with Diane Emery MD   [facility] ([facility])    [address] [place] [address]   [phone]           Bring a current list of meds and any records pertaining to this visit. For Physicals, please bring immunization records and any forms needing to be filled out. Please arrive 10 minutes early to complete paperwork.            Jan 18, 2018  9:30 AM CST   (Arrive by 9:15 AM)   [facility] Extremity with Moustapha Adkins PT   [facility] [facility] ([facility]  )    [address] [place] [id number] [phone]              Who to contact     If you have questions or need follow up information about today's clinic visit or your schedule please contact [facility] directly at [phone].  Normal or non-critical lab and imaging results will be communicated to you by MyChart, letter or phone within 4 business days after the clinic has received the results. If you do not hear from us within 7 days, please contact the clinic through Imagination Technologieshart or phone. If you have a critical or abnormal lab result, we will notify you by phone as soon as possible.  Submit refill requests through [employer] or call your pharmacy and they will forward the refill request to us. Please allow 3 business days for your refill to be completed.          Additional Information About Your Visit        [employer] Information     [employer] gives you secure access to your electronic  health record. If you see a primary care provider, you can also send messages to your care team and make appointments. If you have questions, please call your primary care clinic.  If you do not have a primary care provider, please call [phone] and they will assist you.        Care EveryWhere ID     This is your Care EveryWhere ID. This could be used by other organizations to access your [place] medical records  [id number]         Blood Pressure from Last 3 Encounters:   12/19/17 143/80   11/21/17 136/78   10/27/17 123/78    Weight from Last 3 Encounters:   12/19/17 77.2 kg (170 lb 4 oz)   11/21/17 76.2 kg (168 lb)   10/27/17 77.9 kg (171 lb 12.8 oz)              We Performed the Following     INTERROGATION DEVICE EVAL REMOTE, PACER/ICD        Primary Care Provider Office Phone # Fax #    Denise HICKS VERONICA White [facility] [phone] [address] [place] [address]        Equal Access to Services     [facility]: Hadii aad ku hadasho Soomaali, waaxda luqadaha, qaybta kaalmada adeegyada, waxay idiin hayaan ade sanchez . So [facility] [phone].    ATENCIÓN: Si habla español, tiene a whipple disposición servicios gratuitos de asistencia lingüística. Llame al [phone].    We comply with applicable federal civil rights laws and [place] laws. We do not discriminate on the basis of race, color, national origin, age, disability, sex, sexual orientation, or gender identity.            Thank you!     Thank you for choosing [facility]  for your care. Our goal is always to provide you with excellent care. Hearing back from our patients is one way we can continue to improve our services. Please take a few minutes to complete the written survey that you may receive in the mail after your visit with us. Thank you!             Your Updated Medication List - Protect others around you: Learn how to safely use, store and throw away your medicines at www.disposemymeds.org.           This list is accurate as of: 1/14/18 11:59 PM.  Always use your most recent med list.                   Brand Name Dispense Instructions for use Diagnosis    aspirin 81 MG tablet      Take 81 mg by mouth daily        Blood Pressure Monitor Kit     1 kit    1 kit daily    Chronic congestive heart failure, unspecified congestive heart failure type (H)       carvedilol 6.25 MG tablet    COREG    90 tablet    Take 1 tablet (6.25 mg) by mouth 2 times daily (with meals)    Chronic congestive heart failure, unspecified congestive heart failure type (H)       furosemide 20 MG tablet    LASIX    90 tablet    Take 1 tablet (20 mg) by mouth daily as needed Only take if weight up by 3lbs    JOHNSON (dyspnea on exertion), Chronic congestive heart failure, unspecified congestive heart failure type (H), Acute on chronic systolic congestive heart failure (H)       lisinopril 5 MG tablet    PRINIVIL/ZESTRIL    180 tablet    Take 1 tablet (5 mg) by mouth 2 times daily    Chronic congestive heart failure, unspecified congestive heart failure type (H)       oxyCODONE-acetaminophen 5-325 MG per tablet    PERCOCET    60 tablet    1 tablet bid prn shoulder pain    Acute pain of left shoulder       spironolactone 25 MG tablet    ALDACTONE    45 tablet    Take 0.5 tablets (12.5 mg) by mouth daily    Chronic congestive heart failure, unspecified congestive heart failure type (H)

## 2018-01-15 NOTE — TELEPHONE ENCOUNTER
Pts wife called to report that he has been tired all day.  He denies chest pain or palpitations.  His ICD transmission was reviewed per MD order, his ICD check shows 4 NSVT episodes, the most recent one was on 12/27/17 for 20 sec, 171 bpm and the stored EGM suggests ST.  He RV paces 0% of the time, his presenting does not show any ectopy, his heart rate histograms show good heart rate variation and his ICD battery estimates 12 years left.  Pt will contact his primary MD tomorrow, his ICD check is normal .    Remote ICD

## 2018-01-18 ENCOUNTER — OFFICE VISIT (OUTPATIENT)
Dept: FAMILY MEDICINE | Facility: CLINIC | Age: 53
End: 2018-01-18
Payer: COMMERCIAL

## 2018-01-18 VITALS
TEMPERATURE: 98.3 F | RESPIRATION RATE: 18 BRPM | WEIGHT: 179.5 LBS | HEIGHT: 67 IN | DIASTOLIC BLOOD PRESSURE: 82 MMHG | BODY MASS INDEX: 28.17 KG/M2 | SYSTOLIC BLOOD PRESSURE: 127 MMHG | OXYGEN SATURATION: 96 % | HEART RATE: 92 BPM

## 2018-01-18 DIAGNOSIS — M25.512 ACUTE PAIN OF LEFT SHOULDER: ICD-10-CM

## 2018-01-18 PROCEDURE — 99213 OFFICE O/P EST LOW 20 MIN: CPT | Performed by: FAMILY MEDICINE

## 2018-01-18 RX ORDER — OXYCODONE AND ACETAMINOPHEN 5; 325 MG/1; MG/1
TABLET ORAL
Qty: 90 TABLET | Refills: 0 | Status: SHIPPED | OUTPATIENT
Start: 2018-01-18 | End: 2018-02-20

## 2018-01-18 NOTE — PROGRESS NOTES
SUBJECTIVE:   Akshat Fragoso is a 52 year old male who presents to clinic today for the following health issues:    Follow up shoulder pain.  Current status: still in pain, on a pain scale 10/10.     Acute injury to LEFT shoulder mid-November when fell off deer stand and hung from harness x 30 minutes before could be helped down.  LEFT shoulder dislocated- friend popped it back in-- has been sore and with restricted ROM ever since.  Saw PCP 11-.  Xrays negative.  Was referred to ORTHO but never went.  Would like to start PT.  Unable to do MRI secondary to ICD in place with history of nonischemic CM felt secondary to untreated strep waiting for valve replacement and possible heart transplant in the future.     He has no strength and restricted ROM in LEFT shoulder feeling pain at top and anterior shoulder noting it to be burning.  He requests refill of his Percocet today.     He has been out of town on vacation.  His mom just passed yesterday so he has not been able to start PT until today.  Would like to increase medication to tid dosing for this next month as he begins PT.    Problem list and histories reviewed & adjusted, as indicated.  Additional history: as documented    Patient Active Problem List   Diagnosis     CARDIOVASCULAR SCREENING; LDL GOAL LESS THAN 160     Acute right lumbar radiculopathy     Personal history of smoking     CHF (congestive heart failure) (H)     Acute systolic heart failure (H)     Cardiomyopathy, unspecified     Cardiomyopathy, nonischemic (H)     Pulmonary nodules, next CT due 1/2018     Personal history of tobacco use, presenting hazards to health     ICD (implantable cardioverter-defibrillator) in place- [employer], single chamber- NOT dependent     Past Surgical History:   Procedure Laterality Date     None         Social History   Substance Use Topics     Smoking status: Former Smoker     Packs/day: 0.50     Years: 15.00     Types: Cigarettes     Quit date:  "12/1/2016     Smokeless tobacco: Former User     Quit date: 10/24/2011     Alcohol use No     No family history on file.      Current Outpatient Prescriptions   Medication Sig Dispense Refill     oxyCODONE-acetaminophen (PERCOCET) 5-325 MG per tablet 1 tablet tid prn shoulder pain 90 tablet 0     furosemide (LASIX) 20 MG tablet Take 1 tablet (20 mg) by mouth daily as needed Only take if weight up by 3lbs 90 tablet 3     aspirin 81 MG tablet Take 81 mg by mouth daily       lisinopril (PRINIVIL/ZESTRIL) 5 MG tablet Take 1 tablet (5 mg) by mouth 2 times daily 180 tablet 3     spironolactone (ALDACTONE) 25 MG tablet Take 0.5 tablets (12.5 mg) by mouth daily 45 tablet 3     carvedilol (COREG) 6.25 MG tablet Take 1 tablet (6.25 mg) by mouth 2 times daily (with meals) 90 tablet 3     Blood Pressure Monitor KIT 1 kit daily 1 kit 0     Allergies   Allergen Reactions     No Known Allergies      BP Readings from Last 3 Encounters:   01/18/18 127/82   12/19/17 143/80   11/21/17 136/78    Wt Readings from Last 3 Encounters:   01/18/18 179 lb 8 oz (81.4 kg)   12/19/17 170 lb 4 oz (77.2 kg)   11/21/17 168 lb (76.2 kg)                        Reviewed and updated as needed this visit by clinical staff       Reviewed and updated as needed this visit by Provider         ROS:  Constitutional, HEENT, cardiovascular, pulmonary, gi and gu systems are negative, except as otherwise noted.      OBJECTIVE:   /82 (BP Location: Left arm, Patient Position: Sitting, Cuff Size: Adult Regular)  Pulse 92  Temp 98.3  F (36.8  C) (Oral)  Resp 18  Ht 5' 7\" (1.702 m)  Wt 179 lb 8 oz (81.4 kg)  SpO2 96%  BMI 28.11 kg/m2  Body mass index is 28.11 kg/(m^2).  GENERAL: healthy, alert and no distress  EYES: Eyes grossly normal to inspection, PERRL and conjunctivae and sclerae normal  NECK: no adenopathy, no asymmetry, masses, or scars and thyroid normal to palpation  MS: LUE hanging down at side, painful with any ROM.  SKIN: no suspicious lesions " or rashes  PSYCH: mentation appears normal, affect normal/bright    Diagnostic Test Results:  none     ASSESSMENT/PLAN:     1. Acute pain of left shoulder    - oxyCODONE-acetaminophen (PERCOCET) 5-325 MG per tablet; 1 tablet tid prn shoulder pain  Dispense: 90 tablet; Refill: 0    Starting PT today.  Requests increase to tid dosing this first month with PT beginning.  Discussed we will wean down if he still needs pain medications next month and voices understanding.   checked and has been compliant with his medication.    Diane Emery MD  [facility]

## 2018-01-18 NOTE — MR AVS SNAPSHOT
After Visit Summary   1/18/2018    Akshat Fragoso    MRN: [id number]           Patient Information     Date Of Birth          1965        Visit Information        Provider Department      1/18/2018 8:00 AM Diane Emery MD [facility]        Today's Diagnoses     Acute pain of left shoulder           Follow-ups after your visit        Follow-up notes from your care team     Return if symptoms worsen or fail to improve.      Your next 10 appointments already scheduled     Jan 23, 2018 10:10 AM CST   (Arrive by 9:55 AM)   SERENA Extremity with Darrius Baron, PT   [place] [facility] ([facility]  )    [address] [address] [phone]              Who to contact     If you have questions or need follow up information about today's clinic visit or your schedule please contact [facility] directly at [phone].  Normal or non-critical lab and imaging results will be communicated to you by Medical Compression Systemshart, letter or phone within 4 business days after the clinic has received the results. If you do not hear from us within 7 days, please contact the clinic through [employer] Systemshart or phone. If you have a critical or abnormal lab result, we will notify you by phone as soon as possible.  Submit refill requests through [employer] or call your pharmacy and they will forward the refill request to us. Please allow 3 business days for your refill to be completed.          Additional Information About Your Visit        MyChart Information     [employer] gives you secure access to your electronic health record. If you see a primary care provider, you can also send messages to your care team and make appointments. If you have questions, please call your primary care clinic.  If you do not have a primary care provider, please call [phone] and they will assist you.        Care EveryWhere ID     This is  "your Care EveryWhere ID. This could be used by other organizations to access your [place] medical records  [id number]        Your Vitals Were     Pulse Temperature Respirations Height Pulse Oximetry BMI (Body Mass Index)    92 98.3  F (36.8  C) (Oral) 18 5' 7\" (1.702 m) 96% 28.11 kg/m2       Blood Pressure from Last 3 Encounters:   01/18/18 127/82   12/19/17 143/80   11/21/17 136/78    Weight from Last 3 Encounters:   01/18/18 179 lb 8 oz (81.4 kg)   12/19/17 170 lb 4 oz (77.2 kg)   11/21/17 168 lb (76.2 kg)              Today, you had the following     No orders found for display         Today's Medication Changes          These changes are accurate as of: 1/18/18 11:16 AM.  If you have any questions, ask your nurse or doctor.               These medicines have changed or have updated prescriptions.        Dose/Directions    oxyCODONE-acetaminophen 5-325 MG per tablet   Commonly known as:  PERCOCET   This may have changed:  additional instructions   Used for:  Acute pain of left shoulder        1 tablet tid prn shoulder pain   Quantity:  90 tablet   Refills:  0            Where to get your medicines      Some of these will need a paper prescription and others can be bought over the counter.  Ask your nurse if you have questions.     Bring a paper prescription for each of these medications     oxyCODONE-acetaminophen 5-325 MG per tablet                Primary Care Provider Office Phone # Fax #    Denise White, APRN [phone] [address] [place] [address]        Equal Access to Services     [facility]: Hadii carlito villa hadasho Soomaali, waaxda luqadaha, qaybta kaalmada adeegharshil, kieran idiin hayaan adeeg kharash la'aan . So [facility] [phone].    ATENCIÓN: Si habla español, tiene a whipple disposición servicios gratuitos de asistencia lingüística. Llame al [phone].    We comply with applicable federal civil rights laws and [place] laws. We do not discriminate on " the basis of race, color, national origin, age, disability, sex, sexual orientation, or gender identity.            Thank you!     Thank you for choosing [facility]  for your care. Our goal is always to provide you with excellent care. Hearing back from our patients is one way we can continue to improve our services. Please take a few minutes to complete the written survey that you may receive in the mail after your visit with us. Thank you!             Your Updated Medication List - Protect others around you: Learn how to safely use, store and throw away your medicines at www.disposemymeds.org.          This list is accurate as of: 1/18/18 11:16 AM.  Always use your most recent med list.                   Brand Name Dispense Instructions for use Diagnosis    aspirin 81 MG tablet      Take 81 mg by mouth daily        Blood Pressure Monitor Kit     1 kit    1 kit daily    Chronic congestive heart failure, unspecified congestive heart failure type (H)       carvedilol 6.25 MG tablet    COREG    90 tablet    Take 1 tablet (6.25 mg) by mouth 2 times daily (with meals)    Chronic congestive heart failure, unspecified congestive heart failure type (H)       furosemide 20 MG tablet    LASIX    90 tablet    Take 1 tablet (20 mg) by mouth daily as needed Only take if weight up by 3lbs    JOHNSON (dyspnea on exertion), Chronic congestive heart failure, unspecified congestive heart failure type (H), Acute on chronic systolic congestive heart failure (H)       lisinopril 5 MG tablet    PRINIVIL/ZESTRIL    180 tablet    Take 1 tablet (5 mg) by mouth 2 times daily    Chronic congestive heart failure, unspecified congestive heart failure type (H)       oxyCODONE-acetaminophen 5-325 MG per tablet    PERCOCET    90 tablet    1 tablet tid prn shoulder pain    Acute pain of left shoulder       spironolactone 25 MG tablet    ALDACTONE    45 tablet    Take 0.5 tablets (12.5 mg) by mouth daily    Chronic congestive heart  failure, unspecified congestive heart failure type (H)

## 2018-02-11 DIAGNOSIS — I50.9 CHRONIC CONGESTIVE HEART FAILURE, UNSPECIFIED CONGESTIVE HEART FAILURE TYPE: ICD-10-CM

## 2018-02-12 RX ORDER — CARVEDILOL 6.25 MG/1
TABLET ORAL
Qty: 180 TABLET | Refills: 3 | Status: SHIPPED | OUTPATIENT
Start: 2018-02-12 | End: 2018-03-19 | Stop reason: DRUGHIGH

## 2018-02-20 ENCOUNTER — OFFICE VISIT (OUTPATIENT)
Dept: FAMILY MEDICINE | Facility: CLINIC | Age: 53
End: 2018-02-20
Payer: COMMERCIAL

## 2018-02-20 VITALS
TEMPERATURE: 97.8 F | HEART RATE: 88 BPM | BODY MASS INDEX: 28.19 KG/M2 | DIASTOLIC BLOOD PRESSURE: 74 MMHG | WEIGHT: 180 LBS | RESPIRATION RATE: 18 BRPM | SYSTOLIC BLOOD PRESSURE: 122 MMHG

## 2018-02-20 DIAGNOSIS — G89.29 CHRONIC PAIN IN LEFT SHOULDER: ICD-10-CM

## 2018-02-20 DIAGNOSIS — M25.512 CHRONIC PAIN IN LEFT SHOULDER: ICD-10-CM

## 2018-02-20 PROCEDURE — 99213 OFFICE O/P EST LOW 20 MIN: CPT | Performed by: FAMILY MEDICINE

## 2018-02-20 RX ORDER — OXYCODONE AND ACETAMINOPHEN 5; 325 MG/1; MG/1
TABLET ORAL
Qty: 84 TABLET | Refills: 0 | Status: SHIPPED | OUTPATIENT
Start: 2018-02-20 | End: 2018-06-26

## 2018-02-20 NOTE — MR AVS SNAPSHOT
After Visit Summary   2/20/2018    Akshat Fragoso    MRN: [id number]           Patient Information     Date Of Birth          1965        Visit Information        Provider Department      2/20/2018 8:00 AM Diane Emery MD [facility]        Today's Diagnoses     Chronic pain in left shoulder           Follow-ups after your visit        Follow-up notes from your care team     Return if symptoms worsen or fail to improve.      Who to contact     If you have questions or need follow up information about today's clinic visit or your schedule please contact [facility] directly at [phone].  Normal or non-critical lab and imaging results will be communicated to you by MyChart, letter or phone within 4 business days after the clinic has received the results. If you do not hear from us within 7 days, please contact the clinic through Volumental[employer] or phone. If you have a critical or abnormal lab result, we will notify you by phone as soon as possible.  Submit refill requests through [facility] or call your pharmacy and they will forward the refill request to us. Please allow 3 business days for your refill to be completed.          Additional Information About Your Visit        MyChart Information     [facility] gives you secure access to your electronic health record. If you see a primary care provider, you can also send messages to your care team and make appointments. If you have questions, please call your primary care clinic.  If you do not have a primary care provider, please call [phone] and they will assist you.        Care EveryWhere ID     This is your Care EveryWhere ID. This could be used by other organizations to access your [facility] medical records  [id number]        Your Vitals Were     Pulse Temperature Respirations BMI (Body Mass Index)          88 97.8  F (36.6  C) (Oral) 18 28.19 kg/m2         Blood Pressure from Last 3  Encounters:   02/20/18 122/74   01/18/18 127/82   12/19/17 143/80    Weight from Last 3 Encounters:   02/20/18 180 lb (81.6 kg)   01/18/18 179 lb 8 oz (81.4 kg)   12/19/17 170 lb 4 oz (77.2 kg)              Today, you had the following     No orders found for display         Today's Medication Changes          These changes are accurate as of 2/20/18  8:41 AM.  If you have any questions, ask your nurse or doctor.               These medicines have changed or have updated prescriptions.        Dose/Directions    oxyCODONE-acetaminophen 5-325 MG per tablet   Commonly known as:  PERCOCET   This may have changed:  additional instructions   Used for:  Chronic pain in left shoulder   Changed by:  Diane Emery MD        1 tablet tid x 2wks, then bid x 2wks, qd x 2wks then dc   Quantity:  84 tablet   Refills:  0            Where to get your medicines      Some of these will need a paper prescription and others can be bought over the counter.  Ask your nurse if you have questions.     Bring a paper prescription for each of these medications     oxyCODONE-acetaminophen 5-325 MG per tablet                Primary Care Provider Office Phone # Fax #    VERONICA Hallman [facility] [phone] [address] [place] [address]        Equal Access to Services     MAXIMINO TALAMANTES AH: Hadii carlito villa hadasho Soomaali, waaxda luqadaha, qaybta kaalmada adeegyada, kieran julio. So [facility] [phone].    ATENCIÓN: Si habla español, tiene a whipple disposición servicios gratuitos de asistencia lingüística. Lacho al [phone].    We comply with applicable federal civil rights laws and [place] laws. We do not discriminate on the basis of race, color, national origin, age, disability, sex, sexual orientation, or gender identity.            Thank you!     Thank you for choosing [facility]  for your care. Our goal is always to provide you with  excellent care. Hearing back from our patients is one way we can continue to improve our services. Please take a few minutes to complete the written survey that you may receive in the mail after your visit with us. Thank you!             Your Updated Medication List - Protect others around you: Learn how to safely use, store and throw away your medicines at www.disposemymeds.org.          This list is accurate as of 2/20/18  8:41 AM.  Always use your most recent med list.                   Brand Name Dispense Instructions for use Diagnosis    aspirin 81 MG tablet      Take 81 mg by mouth daily        Blood Pressure Monitor Kit     1 kit    1 kit daily    Chronic congestive heart failure, unspecified congestive heart failure type (H)       carvedilol 6.25 MG tablet    COREG    180 tablet    TAKE ONE TABLET BY MOUTH TWICE A DAY WITH MEALS    Chronic congestive heart failure, unspecified congestive heart failure type (H)       furosemide 20 MG tablet    LASIX    90 tablet    Take 1 tablet (20 mg) by mouth daily as needed Only take if weight up by 3lbs    JOHNSON (dyspnea on exertion), Chronic congestive heart failure, unspecified congestive heart failure type (H), Acute on chronic systolic congestive heart failure (H)       lisinopril 5 MG tablet    PRINIVIL/ZESTRIL    180 tablet    Take 1 tablet (5 mg) by mouth 2 times daily    Chronic congestive heart failure, unspecified congestive heart failure type (H)       oxyCODONE-acetaminophen 5-325 MG per tablet    PERCOCET    84 tablet    1 tablet tid x 2wks, then bid x 2wks, qd x 2wks then dc    Chronic pain in left shoulder       spironolactone 25 MG tablet    ALDACTONE    45 tablet    Take 0.5 tablets (12.5 mg) by mouth daily    Chronic congestive heart failure, unspecified congestive heart failure type (H)

## 2018-02-20 NOTE — NURSING NOTE
"Chief Complaint   Patient presents with     RECHECK     follow-up left shoulder pain       Initial /74  Pulse 88  Temp 97.8  F (36.6  C) (Oral)  Resp 18  Wt 180 lb (81.6 kg)  BMI 28.19 kg/m2 Estimated body mass index is 28.19 kg/(m^2) as calculated from the following:    Height as of 1/18/18: 5' 7\" (1.702 m).    Weight as of this encounter: 180 lb (81.6 kg).  Medication Reconciliation: complete     Frederick Mcleod MA      "

## 2018-02-20 NOTE — PROGRESS NOTES
SUBJECTIVE:   Akshat Fragoso is a 52 year old male who presents to clinic today for the following health issues:      Pt in to follow-up on left shoulder pain.    Acute injury to LEFT shoulder mid-November when fell off deer stand and hung from harness x 30 minutes before could be helped down.  LEFT shoulder dislocated- friend popped it back in-- has been sore and with restricted ROM ever since.  Saw PCP 11-.  Xrays negative.  Was referred to ORTHO but never went.  Would like to start PT.  Unable to do MRI secondary to ICD in place with history of nonischemic CM felt secondary to untreated strep waiting for valve replacement and possible heart transplant in the future.      He has no strength and restricted ROM in LEFT shoulder feeling pain at top and anterior shoulder noting it to be burning.      He has been dealing with his mom's estate this past month and found that she did a reverse mortgage to give money to her sister for gambling. He has had a lot of stress. Patient awaiting heart valve surgery.  Wife lost her job recently-- they have been struggling with health insurance so has not done PT.  He is ready to taper off the Percocet at this time.    Problem list and histories reviewed & adjusted, as indicated.  Additional history: as documented    Patient Active Problem List   Diagnosis     CARDIOVASCULAR SCREENING; LDL GOAL LESS THAN 160     Acute right lumbar radiculopathy     Personal history of smoking     CHF (congestive heart failure) (H)     Acute systolic heart failure (H)     Cardiomyopathy, unspecified     Cardiomyopathy, nonischemic (H)     Pulmonary nodules, next CT due 1/2018     Personal history of tobacco use, presenting hazards to health     ICD (implantable cardioverter-defibrillator) in place- [employer], single chamber- NOT dependent     Past Surgical History:   Procedure Laterality Date     None         Social History   Substance Use Topics     Smoking status: Former Smoker      Packs/day: 0.50     Years: 15.00     Types: Cigarettes     Quit date: 12/1/2016     Smokeless tobacco: Former User     Quit date: 10/24/2011     Alcohol use No     History reviewed. No pertinent family history.      Current Outpatient Prescriptions   Medication Sig Dispense Refill     oxyCODONE-acetaminophen (PERCOCET) 5-325 MG per tablet 1 tablet tid x 2wks, then bid x 2wks, qd x 2wks then dc 84 tablet 0     carvedilol (COREG) 6.25 MG tablet TAKE ONE TABLET BY MOUTH TWICE A DAY WITH MEALS 180 tablet 3     furosemide (LASIX) 20 MG tablet Take 1 tablet (20 mg) by mouth daily as needed Only take if weight up by 3lbs 90 tablet 3     aspirin 81 MG tablet Take 81 mg by mouth daily       lisinopril (PRINIVIL/ZESTRIL) 5 MG tablet Take 1 tablet (5 mg) by mouth 2 times daily 180 tablet 3     spironolactone (ALDACTONE) 25 MG tablet Take 0.5 tablets (12.5 mg) by mouth daily 45 tablet 3     Blood Pressure Monitor KIT 1 kit daily 1 kit 0     Allergies   Allergen Reactions     No Known Allergies      BP Readings from Last 3 Encounters:   02/20/18 122/74   01/18/18 127/82   12/19/17 143/80    Wt Readings from Last 3 Encounters:   02/20/18 180 lb (81.6 kg)   01/18/18 179 lb 8 oz (81.4 kg)   12/19/17 170 lb 4 oz (77.2 kg)                    Reviewed and updated as needed this visit by clinical staff  Tobacco  Allergies  Med Hx  Surg Hx  Fam Hx  Soc Hx      Reviewed and updated as needed this visit by Provider         ROS:  Constitutional, HEENT, cardiovascular, pulmonary, gi and gu systems are negative, except as otherwise noted.    OBJECTIVE:     /74  Pulse 88  Temp 97.8  F (36.6  C) (Oral)  Resp 18  Wt 180 lb (81.6 kg)  BMI 28.19 kg/m2  Body mass index is 28.19 kg/(m^2).  GENERAL: healthy, alert and no distress  EYES: Eyes grossly normal to inspection, PERRL and conjunctivae and sclerae normal  NECK: no adenopathy, no asymmetry, masses, or scars and thyroid normal to palpation  MS: LUE hanging down at side, painful  with any ROM.  SKIN: no suspicious lesions or rashes  PSYCH: mentation appears normal, affect normal/bright    ASSESSMENT/PLAN:     1. Chronic pain in left shoulder    - oxyCODONE-acetaminophen (PERCOCET) 5-325 MG per tablet; 1 tablet tid x 2wks, then bid x 2wks, qd x 2wks then dc  Dispense: 84 tablet; Refill: 0    Taper given.  Still recommend PT to help with ROM and pain and strength.    Diane Emery MD  [facility]

## 2018-02-23 NOTE — TELEPHONE ENCOUNTER
Date: 5/8/2017    Time of Call: 6:09 PM     Diagnosis:  Rib pain     [ VORB ] Ordering provider: Dr. Moy  Order: rib and chest xray for rib pain.  He will not refill Percocet at this time and it will be determined after results are reviewed.     Order received by: Leslie Larios RN       Follow-up/additional notes: Left message for patient to call clinic.  Leslie Larios RN          
Pending Prescriptions:                       Disp   Refills    oxyCODONE-acetaminophen (PERCOCET) 5-325 *28 tab*0            Sig: Take 1 tablet by mouth 2 times daily as needed           for pain maximum 2 tablet(s) per day    Last Visit with Dr. Brynn Moy 4/24/2017 in [place]  Quantity 28  Req. Received 5/5/2017  Patient is scheduled for 6/8/2017 for ICD implant.    Estrella August CMA.         
Pt called into clinic and left voicemail to schedule X-ray.     Attempted to call pt back with no luck. Left voicemail for him to call clinic back to schedule.    Henrique Amezcua RN    
Pt scheduled for x-ray at 9:30am in [place] prior to his Dr. Cardona appt at 10:30am on Friday May 12th.     Left message with pt.    Henrique Amezcua RN    
Reviewed X-ray with Dr. Moy and he states that he can not renew pt's Percocet. Pt needs to seek out PCP for refill or have referral to pain clinic.     Pt verbalized understanding of orders and states he will go to PCP for this.    Henrique Amezcua RN    
no

## 2018-03-19 ENCOUNTER — TELEPHONE (OUTPATIENT)
Dept: CARDIOLOGY | Facility: CLINIC | Age: 53
End: 2018-03-19

## 2018-03-19 DIAGNOSIS — I50.22 CHRONIC SYSTOLIC CONGESTIVE HEART FAILURE (H): Primary | ICD-10-CM

## 2018-03-19 RX ORDER — CARVEDILOL 6.25 MG/1
12.5 TABLET ORAL 2 TIMES DAILY WITH MEALS
Qty: 360 TABLET | Refills: 1 | Status: CANCELLED | OUTPATIENT
Start: 2018-03-19

## 2018-03-19 RX ORDER — CARVEDILOL 12.5 MG/1
12.5 TABLET ORAL 2 TIMES DAILY WITH MEALS
Qty: 180 TABLET | Refills: 3 | Status: SHIPPED | OUTPATIENT
Start: 2018-03-19 | End: 2018-08-08

## 2018-03-20 DIAGNOSIS — I42.9 CARDIOMYOPATHY (H): Primary | ICD-10-CM

## 2018-03-20 NOTE — TELEPHONE ENCOUNTER
Patient is telling me that he is at this dose now and needs this strength until the next dose bump to 12.5  Michelle Day [facility]  [phone]

## 2018-03-21 DIAGNOSIS — I50.9 CHRONIC CONGESTIVE HEART FAILURE, UNSPECIFIED CONGESTIVE HEART FAILURE TYPE: ICD-10-CM

## 2018-03-21 RX ORDER — CARVEDILOL 6.25 MG/1
9.38 TABLET ORAL 2 TIMES DAILY WITH MEALS
Qty: 90 TABLET | Refills: 3 | Status: SHIPPED | OUTPATIENT
Start: 2018-03-21 | End: 2018-08-08

## 2018-03-22 RX ORDER — SPIRONOLACTONE 25 MG/1
TABLET ORAL
Qty: 45 TABLET | Refills: 3 | Status: SHIPPED | OUTPATIENT
Start: 2018-03-22 | End: 2018-11-09

## 2018-03-27 ENCOUNTER — OFFICE VISIT (OUTPATIENT)
Dept: FAMILY MEDICINE | Facility: CLINIC | Age: 53
End: 2018-03-27
Payer: COMMERCIAL

## 2018-03-27 VITALS
BODY MASS INDEX: 28.07 KG/M2 | DIASTOLIC BLOOD PRESSURE: 82 MMHG | RESPIRATION RATE: 18 BRPM | TEMPERATURE: 96.1 F | OXYGEN SATURATION: 98 % | HEART RATE: 89 BPM | WEIGHT: 179.2 LBS | SYSTOLIC BLOOD PRESSURE: 128 MMHG

## 2018-03-27 DIAGNOSIS — G89.29 CHRONIC LEFT SHOULDER PAIN: Primary | ICD-10-CM

## 2018-03-27 DIAGNOSIS — M25.512 CHRONIC LEFT SHOULDER PAIN: Primary | ICD-10-CM

## 2018-03-27 PROCEDURE — 99214 OFFICE O/P EST MOD 30 MIN: CPT | Performed by: NURSE PRACTITIONER

## 2018-03-27 NOTE — PROGRESS NOTES
SUBJECTIVE:   Akshat Fragoso is a 52 year old male who presents to clinic today for the following health issues:    Musculoskeletal problem/pain      Duration: since november    Description  Location: left shoulder pain- dislocated in it November     Intensity:  moderate    Accompanying signs and symptoms: none    Precipitating or alleviating factors:  Trauma or overuse: YES  Aggravating factors include: none    Therapies tried and outcome: nothing    Per pt dislocated shoulder in November.  Was reset in the field by a friend who is also a /    Left shoulder pain rated +10/10 since then.    Was advised to do PT and he has not been able to do so yet due to vacations, death of his mother and now lack of insurance.    Can not get MRI due to insurance and ICD.    Was supposed to have heart valve surgery and can't due to insurance  Also reports 4 abscessed teeth.    Heading out of town today for a road trip with family for 3 weeks.   Miserable.  Can only sit on the couch.    Reports ice and tylenol and ibuprofen are ineffective.    Has weaned down from percocet TID to once daily.    Requesting refill of percocet for TID until July when he thinks he will have insurance again.      Problem list and histories reviewed & adjusted, as indicated.  Additional history: as documented    Reviewed and updated as needed this visit by clinical staff  Tobacco  Allergies  Meds  Problems  Med Hx  Surg Hx  Fam Hx  Soc Hx        Reviewed and updated as needed this visit by Provider  Tobacco  Allergies  Meds  Problems  Med Hx  Surg Hx  Fam Hx  Soc Hx          ROS:  Constitutional, HEENT, cardiovascular, pulmonary, gi and gu systems are negative, except as otherwise noted.    OBJECTIVE:     /82 (BP Location: Left arm, Patient Position: Chair, Cuff Size: Adult Regular)  Pulse 89  Temp 96.1  F (35.6  C) (Tympanic)  Resp 18  Wt 179 lb 3.2 oz (81.3 kg)  SpO2 98%  BMI 28.07 kg/m2  Body mass index is 28.07  kg/(m^2).  GENERAL: healthy, alert and no distress  RESP: lungs clear to auscultation - no rales, rhonchi or wheezes  CV: regular rate and rhythm, normal S1 S2, no S3 or S4, no murmur, click or rub, no peripheral edema and peripheral pulses strong  MS: decreased range of motion to left shoulder and no edema  SKIN: no suspicious lesions or rashes      ASSESSMENT/PLAN:         ICD-10-CM    1. Chronic left shoulder pain M25.512     G89.29      Per patient he has successfully weaned down to 1 percocet daily.    Discussed with Denise White - she reports she has had previous yolie discussions with him in the past about increasing percocet usage.    Reports she has had discussions with his about red flags with his controlled substance use.     reviewed recent history consistent with noted visits in chart.  Since November has been prescribed 314 tablets percocet.     She Recommended I not refill percocet with recent successful wean.    Refill declined.    Offered voltaren gel, pt declined  Offered referral to pain management, pt declined.    Recommend tylenol, advil or naproxen with ice and heat as needed.      25 min with pt and more than 90% of the time was spent in counseling and coordination of care of the above issues    VERONICA Downing [facility]    .rayna

## 2018-03-27 NOTE — MR AVS SNAPSHOT
After Visit Summary   3/27/2018    Akshat Fragoso    MRN: [id number]           Patient Information     Date Of Birth          1965        Visit Information        Provider Department      3/27/2018 10:40 AM Ilana Paulino APRN CNP [facility]        Today's Diagnoses     Chronic left shoulder pain    -  1       Follow-ups after your visit        Your next 10 appointments already scheduled     Mar 29, 2018  9:20 AM CDT   Office Visit with VERONICA Hallman CNP   [facility] ([facility])    [address] [facility] [id number] [phone]           Bring a current list of meds and any records pertaining to this visit. For Physicals, please bring immunization records and any forms needing to be filled out. Please arrive 10 minutes early to complete paperwork.              Who to contact     If you have questions or need follow up information about today's clinic visit or your schedule please contact [facility] directly at [phone].  Normal or non-critical lab and imaging results will be communicated to you by [employer], letter or phone within 4 business days after the clinic has received the results. If you do not hear from us within 7 days, please contact the clinic through [employer] or phone. If you have a critical or abnormal lab result, we will notify you by phone as soon as possible.  Submit refill requests through [facility] [employer] or call your pharmacy and they will forward the refill request to us. Please allow 3 business days for your refill to be completed.          Additional Information About Your Visit        MyChart Information     [employer] gives you secure access to your electronic health record. If you see a primary care provider, you can also send messages to your care team and make appointments. If you have questions, please call your primary care clinic.  If you do not have a primary care  provider, please call [phone] and they will assist you.        Care EveryWhere ID     This is your Care EveryWhere ID. This could be used by other organizations to access your [facility] medical records  [id number]        Your Vitals Were     Pulse Temperature Respirations Pulse Oximetry BMI (Body Mass Index)       89 96.1  F (35.6  C) (Tympanic) 18 98% 28.07 kg/m2        Blood Pressure from Last 3 Encounters:   03/27/18 128/82   02/20/18 122/74   01/18/18 127/82    Weight from Last 3 Encounters:   03/27/18 179 lb 3.2 oz (81.3 kg)   02/20/18 180 lb (81.6 kg)   01/18/18 179 lb 8 oz (81.4 kg)              Today, you had the following     No orders found for display       Primary Care Provider Office Phone # Fax #    VERONICA Hallman [facility] [phone] [address] [place] [address]        Equal Access to Services     BETI TALAMANTES : Hadii aad ku hadasho Soomaali, waaxda luqadaha, qaybta kaalmada adeegyada, waxay idiin haysonido sanchez . So [facility] [phone].    ATENCIÓN: Si habla español, tiene a whipple disposición servicios gratuitos de asistencia lingüística. Llame al [phone].    We comply with applicable federal civil rights laws and [place] laws. We do not discriminate on the basis of race, color, national origin, age, disability, sex, sexual orientation, or gender identity.            Thank you!     Thank you for choosing [facility]  for your care. Our goal is always to provide you with excellent care. Hearing back from our patients is one way we can continue to improve our services. Please take a few minutes to complete the written survey that you may receive in the mail after your visit with us. Thank you!             Your Updated Medication List - Protect others around you: Learn how to safely use, store and throw away your medicines at www.disposemymeds.org.          This list is accurate as of 3/27/18 11:48 AM.  Always use your  most recent med list.                   Brand Name Dispense Instructions for use Diagnosis    aspirin 81 MG tablet      Take 81 mg by mouth daily        Blood Pressure Monitor Kit     1 kit    1 kit daily    Chronic congestive heart failure, unspecified congestive heart failure type (H)       * carvedilol 12.5 MG tablet    COREG    180 tablet    Take 1 tablet (12.5 mg) by mouth 2 times daily (with meals)    Chronic systolic congestive heart failure (H)       * carvedilol 6.25 MG tablet    COREG    90 tablet    Take 1.5 tablets (9.375 mg) by mouth 2 times daily (with meals)    Cardiomyopathy (H)       furosemide 20 MG tablet    LASIX    90 tablet    Take 1 tablet (20 mg) by mouth daily as needed Only take if weight up by 3lbs    JOHNSON (dyspnea on exertion), Chronic congestive heart failure, unspecified congestive heart failure type (H), Acute on chronic systolic congestive heart failure (H)       lisinopril 5 MG tablet    PRINIVIL/ZESTRIL    180 tablet    Take 1 tablet (5 mg) by mouth 2 times daily    Chronic congestive heart failure, unspecified congestive heart failure type (H)       oxyCODONE-acetaminophen 5-325 MG per tablet    PERCOCET    84 tablet    1 tablet tid x 2wks, then bid x 2wks, qd x 2wks then dc    Chronic pain in left shoulder       spironolactone 25 MG tablet    ALDACTONE    45 tablet    TAKE ONE-HALF TABLET BY MOUTH EVERY DAY    Chronic congestive heart failure, unspecified congestive heart failure type (H)       * Notice:  This list has 2 medication(s) that are the same as other medications prescribed for you. Read the directions carefully, and ask your doctor or other care provider to review them with you.

## 2018-03-28 ENCOUNTER — OFFICE VISIT (OUTPATIENT)
Dept: FAMILY MEDICINE | Facility: CLINIC | Age: 53
End: 2018-03-28
Payer: COMMERCIAL

## 2018-03-28 VITALS
OXYGEN SATURATION: 93 % | SYSTOLIC BLOOD PRESSURE: 116 MMHG | HEART RATE: 93 BPM | TEMPERATURE: 98.2 F | DIASTOLIC BLOOD PRESSURE: 81 MMHG | RESPIRATION RATE: 20 BRPM

## 2018-03-28 DIAGNOSIS — Z95.810 ICD (IMPLANTABLE CARDIOVERTER-DEFIBRILLATOR) IN PLACE: ICD-10-CM

## 2018-03-28 DIAGNOSIS — M25.512 LEFT SHOULDER PAIN, UNSPECIFIED CHRONICITY: Primary | ICD-10-CM

## 2018-03-28 DIAGNOSIS — I42.8 CARDIOMYOPATHY, NONISCHEMIC (H): ICD-10-CM

## 2018-03-28 LAB
AMPHETAMINES UR QL: NOT DETECTED NG/ML
BARBITURATES UR QL SCN: NOT DETECTED NG/ML
BENZODIAZ UR QL SCN: NOT DETECTED NG/ML
BUPRENORPHINE UR QL: NOT DETECTED NG/ML
CANNABINOIDS UR QL: ABNORMAL NG/ML
COCAINE UR QL SCN: NOT DETECTED NG/ML
D-METHAMPHET UR QL: NOT DETECTED NG/ML
METHADONE UR QL SCN: NOT DETECTED NG/ML
OPIATES UR QL SCN: NOT DETECTED NG/ML
OXYCODONE UR QL SCN: ABNORMAL NG/ML
PCP UR QL SCN: NOT DETECTED NG/ML
PROPOXYPH UR QL: NOT DETECTED NG/ML
TRICYCLICS UR QL SCN: NOT DETECTED NG/ML

## 2018-03-28 PROCEDURE — 80306 DRUG TEST PRSMV INSTRMNT: CPT | Performed by: NURSE PRACTITIONER

## 2018-03-28 PROCEDURE — 99214 OFFICE O/P EST MOD 30 MIN: CPT | Performed by: NURSE PRACTITIONER

## 2018-03-28 RX ORDER — OXYCODONE AND ACETAMINOPHEN 5; 325 MG/1; MG/1
TABLET ORAL
Qty: 46 TABLET | Refills: 0 | Status: SHIPPED | OUTPATIENT
Start: 2018-03-28 | End: 2018-06-26

## 2018-03-28 NOTE — PATIENT INSTRUCTIONS
Per our agreement, you will receive no more Percocet/opioids/narcotics from primary care.  Follow up with the pain management group at your earliest convenience.  Continue care with the cardiology group.

## 2018-03-28 NOTE — LETTER
April 2, 2018      Akshat DONATO [place]  [address]  [facility] [address]        Amari Oden,    This note is to let you know the results of your recent urine drug screen.    The drug screen did see the oxycodone as expected. The drug screen also found marijuana/cannabinoids. Please abstaine from marijuana as this is not recommended especially when you are taking oxycodone. It is also currently against the law unless you have a medical prescription.    Let me know if you have any questions.    Results for orders placed or performed in visit on 03/28/18   Drug Abuse Screen Panel 13, Urine (Pain Care Package)   Result Value Ref Range    Cannabinoids (43-sbp-7-carboxy-9-THC) Detected, Abnormal Result (A) NDET^Not Detected ng/mL    Phencyclidine (Phencyclidine) Not Detected NDET^Not Detected ng/mL    Cocaine (Benzoylecgonine) Not Detected NDET^Not Detected ng/mL    Methamphetamine (d-Methamphetamine) Not Detected NDET^Not Detected ng/mL    Opiates (Morphine) Not Detected NDET^Not Detected ng/mL    Amphetamine (d-Amphetamine) Not Detected NDET^Not Detected ng/mL    Benzodiazepines (Nordiazepam) Not Detected NDET^Not Detected ng/mL    Tricyclic Antidepressants (Desipramine) Not Detected NDET^Not Detected ng/mL    Methadone (Methadone) Not Detected NDET^Not Detected ng/mL    Barbiturates (Butalbital) Not Detected NDET^Not Detected ng/mL    Oxycodone (Oxycodone) Detected, Abnormal Result (A) NDET^Not Detected ng/mL    Propoxyphene (Norpropoxyphene) Not Detected NDET^Not Detected ng/mL    Buprenorphine (Buprenorphine) Not Detected NDET^Not Detected ng/mL               Sincerely,        Denise White, APRN CNP/jln

## 2018-03-28 NOTE — MR AVS SNAPSHOT
After Visit Summary   3/28/2018    Akshat Fragoso    MRN: [id number]           Patient Information     Date Of Birth          1965        Visit Information        Provider Department      3/28/2018 11:00 AM Denise White APRN [facility]        Today's Diagnoses     Left shoulder pain, unspecified chronicity    -  1    Cardiomyopathy, nonischemic (H)        ICD (implantable cardioverter-defibrillator) in place- [employer], single chamber- NOT dependent          Care Instructions    Per our agreement, you will receive no more Percocet/opioids/narcotics from primary care.  Follow up with the pain management group at your earliest convenience.  Continue care with the cardiology group.          Follow-ups after your visit        Additional Services     PAIN MANAGEMENT REFERRAL       Your provider has referred you to: [facility] [[email or web address]]: [facility] [place] [phone] [email or web address]      Please call [phone] to make an appointment. Clinic is located: [facility] [address] [place] [address]      Please complete the following questions:    Procedure/Referral: Referral Only -  Comprehensive Evaluation and Management    What is your diagnosis for the patient's pain? Chronic left shoulder pain.     What are your specific questions for the pain specialist? None specific    Are there any red flags that may impact the assessment or management of the patient? Active or recent alcohol, drug or prescription medication misuse (explain): frequent request for percocet. Inability to proceed with left shoulder work up (MRI, ortho consult, etc) due to lack of insurance. He requested a consultation with pain management to get him through to when he can have insurance this summer (June or July).          Please note the Pre-Op Pain  Consult must be scheduled 2-3 weeks prior to the patient's surgery.  Patient's trying to schedule within 2 weeks of surgery may not be accommodated.     Pre-Op Pain Consults are only good for 30 days.    **ANY DIAGNOSTIC TESTS THAT ARE NOT IN EPIC SHOULD BE SENT TO THE PAIN CENTER**    REGARDING OPIOID MEDICATIONS:  The discussion of opioids management, appropriateness of therapy, and dosing will be discussed in patients being seen for evaluation.  The pain management clinics are not long-term prescribing clinics, with transition of prescribing of medications ultimately going back to the referring provider/PCP.  If prescribing is taken over at the pain clinic, it is in actively involved patients whom are appropriate for opioids, urine drug screening is completed, and long-term prescribing plan has been determined.  Therefore, we will not be automatically taking over prescribing at the patient's first visit.  Is this agreeable to you? agrees.     Please be aware that coverage of these services is subject to the terms and limitations of your health insurance plan.  Call member services at your health plan with any benefit or coverage questions.      Please bring the following with you to your appointment:    (1) Any X-Rays, CTs or MRIs which have been performed.  Contact the facility where they were done to arrange for  prior to your scheduled appointment.    (2) List of current medications   (3) This referral request   (4) Any documents/labs given to you for this referral                  Who to contact     If you have questions or need follow up information about today's clinic visit or your schedule please contact [facility] directly at [phone].  Normal or non-critical lab and imaging results will be communicated to you by MyChart, letter or phone within 4 business days after the clinic has received the results. If you do not hear from us within 7 days, please contact the clinic  through [employer] or phone. If you have a critical or abnormal lab result, we will notify you by phone as soon as possible.  Submit refill requests through [employer] or call your pharmacy and they will forward the refill request to us. Please allow 3 business days for your refill to be completed.          Additional Information About Your Visit        [employer] Information     [employer] gives you secure access to your electronic health record. If you see a primary care provider, you can also send messages to your care team and make appointments. If you have questions, please call your primary care clinic.  If you do not have a primary care provider, please call [phone] and they will assist you.        Care EveryWhere ID     This is your Care EveryWhere ID. This could be used by other organizations to access your [place] medical records  [id number]        Your Vitals Were     Pulse Temperature Respirations Pulse Oximetry          93 98.2  F (36.8  C) (Oral) 20 93%         Blood Pressure from Last 3 Encounters:   03/28/18 116/81   03/27/18 128/82   02/20/18 122/74    Weight from Last 3 Encounters:   03/27/18 179 lb 3.2 oz (81.3 kg)   02/20/18 180 lb (81.6 kg)   01/18/18 179 lb 8 oz (81.4 kg)              We Performed the Following     Drug Abuse Screen Panel 13, Urine (Pain Care Package)     PAIN MANAGEMENT REFERRAL          Today's Medication Changes          These changes are accurate as of 3/28/18 11:50 AM.  If you have any questions, ask your nurse or doctor.               These medicines have changed or have updated prescriptions.        Dose/Directions    * oxyCODONE-acetaminophen 5-325 MG per tablet   Commonly known as:  PERCOCET   This may have changed:  Another medication with the same name was added. Make sure you understand how and when to take each.   Used for:  Chronic pain in left shoulder        1 tablet tid x 2wks, then bid x 2wks, qd x 2wks then dc   Quantity:  84 tablet   Refills:  0       *  oxyCODONE-acetaminophen 5-325 MG per tablet   Commonly known as:  PERCOCET   This may have changed:  You were already taking a medication with the same name, and this prescription was added. Make sure you understand how and when to take each.   Used for:  Left shoulder pain, unspecified chronicity        Take one tablet twice a day as needed for 2 weeks. Then take one tablet per day as needed for 2 weeks. Then take 1/2 tablet per day for 1 weeks. Then stop. Okay to fill today.   Quantity:  46 tablet   Refills:  0       * Notice:  This list has 2 medication(s) that are the same as other medications prescribed for you. Read the directions carefully, and ask your doctor or other care provider to review them with you.         Where to get your medicines      Some of these will need a paper prescription and others can be bought over the counter.  Ask your nurse if you have questions.     Bring a paper prescription for each of these medications     oxyCODONE-acetaminophen 5-325 MG per tablet                Primary Care Provider Office Phone # Fax #    VERONICA Hallamn [facility] [phone] [address] [place] [address]        Equal Access to Services     MAXIMINO TALAMANTES : Hadii carlito villa hadasho Sojay, waaxda luqadaha, qaybta kaalmada adestew, kieran sanchez . So [facility] [phone].    ATENCIÓN: Si habla español, tiene a whipple disposición servicios gratuitos de asistencia lingüística. Lacho al [phone].    We comply with applicable federal civil rights laws and [place] laws. We do not discriminate on the basis of race, color, national origin, age, disability, sex, sexual orientation, or gender identity.            Thank you!     Thank you for choosing [facility]  for your care. Our goal is always to provide you with excellent care. Hearing back from our patients is one way we can continue to improve our services. Please take a few  minutes to complete the written survey that you may receive in the mail after your visit with us. Thank you!             Your Updated Medication List - Protect others around you: Learn how to safely use, store and throw away your medicines at www.disposemymeds.org.          This list is accurate as of 3/28/18 11:50 AM.  Always use your most recent med list.                   Brand Name Dispense Instructions for use Diagnosis    aspirin 81 MG tablet      Take 81 mg by mouth daily        Blood Pressure Monitor Kit     1 kit    1 kit daily    Chronic congestive heart failure, unspecified congestive heart failure type (H)       * carvedilol 12.5 MG tablet    COREG    180 tablet    Take 1 tablet (12.5 mg) by mouth 2 times daily (with meals)    Chronic systolic congestive heart failure (H)       * carvedilol 6.25 MG tablet    COREG    90 tablet    Take 1.5 tablets (9.375 mg) by mouth 2 times daily (with meals)    Cardiomyopathy (H)       furosemide 20 MG tablet    LASIX    90 tablet    Take 1 tablet (20 mg) by mouth daily as needed Only take if weight up by 3lbs    JOHNSON (dyspnea on exertion), Chronic congestive heart failure, unspecified congestive heart failure type (H), Acute on chronic systolic congestive heart failure (H)       lisinopril 5 MG tablet    PRINIVIL/ZESTRIL    180 tablet    Take 1 tablet (5 mg) by mouth 2 times daily    Chronic congestive heart failure, unspecified congestive heart failure type (H)       * oxyCODONE-acetaminophen 5-325 MG per tablet    PERCOCET    84 tablet    1 tablet tid x 2wks, then bid x 2wks, qd x 2wks then dc    Chronic pain in left shoulder       * oxyCODONE-acetaminophen 5-325 MG per tablet    PERCOCET    46 tablet    Take one tablet twice a day as needed for 2 weeks. Then take one tablet per day as needed for 2 weeks. Then take 1/2 tablet per day for 1 weeks. Then stop. Okay to fill today.    Left shoulder pain, unspecified chronicity       spironolactone 25 MG tablet    ALDACTONE     45 tablet    TAKE ONE-HALF TABLET BY MOUTH EVERY DAY    Chronic congestive heart failure, unspecified congestive heart failure type (H)       * Notice:  This list has 4 medication(s) that are the same as other medications prescribed for you. Read the directions carefully, and ask your doctor or other care provider to review them with you.

## 2018-03-28 NOTE — PROGRESS NOTES
"  SUBJECTIVE:   Akshat Fragoso is a 52 year old male who presents to clinic today for the following health issues:  Chief Complaint   Patient presents with     Shoulder Pain     Akshat has had problems with left shoulder pain since November 2017.  His shoulder was dislocated while on a hunting trip and was reduced by a friend.  Since that time, he has had ongoing pain and problems with his left shoulder. He has been taking narcotics intermittently.  He has had regular clinic appointments for narcotic refills.  Today he is in the clinic for a narcotic prescription/refill \"for the last time ever.\"  He does acknowledge that he has had conversations with other providers such as Dr. Jackie Bell and Ilana Paulino nurse practitioners about the potential problems with narcotics and the importance of weaning down and weaning off of his narcotics.  His last narcotic fill was done on February 20. He states with that refill, he was able to cut down on his use getting down to 1 per day, but then his shoulder pain escalated and he is now taking 2-3 tablets per day.  His last Percocet intake was taken yesterday.  Today, his pain is escalated.  He is requesting Percocet to take 2 tablets per day for 2 weeks, then 1 tablet per day for 2 weeks and then one half tablet per day for approximately a week.  I discussed with him all of the problems with narcotics and opioids in our community potentially problems with him with addiction, and how harmful this may be to his system.  He also has a complex heart history and he is awaiting a valve replacement. He states he knows he needs to see an orthopedist and pain management.  He is unable to proceed with an magnetic resonance imaging at this time due to his defibrillator.  He is without insurance and is in a hard place regarding outstanding medical bills and his thoughts are that he doesn't know if he will be able to afford to go to pain management or any specialists until he has " insurance at the end of June    He denies any other new or acute issues.  He states his heart is stable and he is taking his medications as prescribed.  He plans to follow-up with his cardiologist in 1-2 months.        Problem list and histories reviewed & adjusted, as indicated.  Additional history: as documented    Patient Active Problem List   Diagnosis     CARDIOVASCULAR SCREENING; LDL GOAL LESS THAN 160     Acute right lumbar radiculopathy     Personal history of smoking     CHF (congestive heart failure) (H)     Acute systolic heart failure (H)     Cardiomyopathy (H)     Cardiomyopathy, nonischemic (H)     Pulmonary nodules, next CT due 1/2018     Personal history of tobacco use, presenting hazards to health     ICD (implantable cardioverter-defibrillator) in place- [employer], single chamber- NOT dependent     Past Surgical History:   Procedure Laterality Date     None         Social History   Substance Use Topics     Smoking status: Former Smoker     Packs/day: 0.50     Years: 15.00     Types: Cigarettes     Quit date: 12/1/2016     Smokeless tobacco: Former User     Quit date: 10/24/2011     Alcohol use No     No family history on file.      Current Outpatient Prescriptions   Medication Sig Dispense Refill     oxyCODONE-acetaminophen (PERCOCET) 5-325 MG per tablet Take one tablet twice a day as needed for 2 weeks. Then take one tablet per day as needed for 2 weeks. Then take 1/2 tablet per day for 1 weeks. Then stop. Okay to fill today. 46 tablet 0     spironolactone (ALDACTONE) 25 MG tablet TAKE ONE-HALF TABLET BY MOUTH EVERY DAY 45 tablet 3     carvedilol (COREG) 6.25 MG tablet Take 1.5 tablets (9.375 mg) by mouth 2 times daily (with meals) 90 tablet 3     carvedilol (COREG) 12.5 MG tablet Take 1 tablet (12.5 mg) by mouth 2 times daily (with meals) 180 tablet 3     oxyCODONE-acetaminophen (PERCOCET) 5-325 MG per tablet 1 tablet tid x 2wks, then bid x 2wks, qd x 2wks then dc 84 tablet 0      furosemide (LASIX) 20 MG tablet Take 1 tablet (20 mg) by mouth daily as needed Only take if weight up by 3lbs 90 tablet 3     aspirin 81 MG tablet Take 81 mg by mouth daily       lisinopril (PRINIVIL/ZESTRIL) 5 MG tablet Take 1 tablet (5 mg) by mouth 2 times daily 180 tablet 3     Blood Pressure Monitor KIT 1 kit daily 1 kit 0     Allergies   Allergen Reactions     No Known Allergies      Recent Labs   Lab Test  06/08/17   0739  01/12/17   0655   01/10/17   0718   01/09/17   1001   LDL   --    --    --   64   --    --    HDL   --    --    --   39*   --    --    TRIG   --    --    --   103   --    --    ALT   --    --    --    --    --   111*   CR  0.90  0.85   < >  0.90   < >  0.85   GFRESTIMATED  90  >90  Non  GFR Calc     < >  90   < >  >90  Non  GFR Calc     GFRESTBLACK  >90   GFR Calc    >90   GFR Calc     < >  >90   GFR Calc     < >  >90   GFR Calc     POTASSIUM  4.2  4.2   < >  4.4   --   4.9   TSH   --    --    --    --    --   1.34    < > = values in this interval not displayed.      BP Readings from Last 3 Encounters:   03/28/18 116/81   03/27/18 128/82   02/20/18 122/74    Wt Readings from Last 3 Encounters:   03/27/18 179 lb 3.2 oz (81.3 kg)   02/20/18 180 lb (81.6 kg)   01/18/18 179 lb 8 oz (81.4 kg)               Labs reviewed in EPIC    Reviewed and updated as needed this visit by clinical staff  Allergies  Meds       Reviewed and updated as needed this visit by Provider         ROS:  Constitutional, HEENT, cardiovascular, pulmonary, gi and gu systems are negative, except as otherwise noted.    OBJECTIVE:     /81  Pulse 93  Temp 98.2  F (36.8  C) (Oral)  Resp 20  SpO2 93%  There is no height or weight on file to calculate BMI.  GENERAL APPEARANCE: healthy, alert and no distress. Smiling.   SKIN: warm and dry  PSYCH: mentation appears normal and affect normal/bright.  Good eye  contact.      ASSESSMENT/PLAN:       (D90.646) Left shoulder pain, unspecified chronicity  (primary encounter diagnosis)  Comment: Chronic  Plan: Drug Abuse Screen Panel 13, Urine (Pain Care         Package), oxyCODONE-acetaminophen (PERCOCET)         5-325 MG per tablet            (I42.8) Cardiomyopathy, nonischemic (H)  Comment:   Plan:     (Z95.810) ICD (implantable cardioverter-defibrillator) in place- [employer], single chamber- NOT dependent  Comment:   Plan:     In follow-up to the history of present illness, I did discuss with the patient that I am frustrated about his requests for narcotic refills, concerned about his health and how harmful this could be for him etc.  I did tell him that when I discovered that he had congestive heart failure on a chest x-ray over a year ago, that lengthened his life and saved his life. I feel that these narcotic prescriptions have the potential to kill him or to cause serious harm.  He verbalizes understanding. I did discuss with him, that I very hesitantly will approve his requests today.  This also is with the understanding that I will not approve anymore narcotics for him again ever. Even pending an acute issue.  He verbalizes understanding.  I did have him leave a urine drug screen today and I did instruct him what that drug screen will show.    I did go ahead and give him a referral to pain management per his request today. I also offered to give him a referral to orthopedist for left shoulder management. He wishes to wait for an ortho consultation until his insurance is in place this summer.    I also discussed with him other pain improvement options such as topical voltaren, biofreeze, tylenol, etc.     So in conclusion, this patient is aware that I will no longer prescribe any more narcotics for him. He is also aware that these narcotic requests will not be approved by other providers as well.  In the event of an acute injury, if he needs pain management  initial pain management will have to be managed by the provider who sees him for that acute injury and that refills would likely not be granted by family practice.  He was appreciative of today's help.  He will continue his care with cardiology.  He'll follow up with pain management.      I spent a total of 30 min with the patient, >50% time spent face to face counseling regarding the above issues, discussing pain management, refills, follow-up, the urine drug screen etc, establishing a plan of care, and coordinating follow up care.       VERONICA [facility]

## 2018-04-20 NOTE — TELEPHONE ENCOUNTER
New Referral:  Pt seen recently by Dr Cardona in clinic, and it was suggested that Mr Fragoso begin to gather information about the VAD or Transplant treatment options, should he need it in the future.  Currently NYHA Class I.  Several attempts to reach the patient by telephone were unsuccessful.  Messages left to call us when he is ready to learn more about these treatments.

## 2018-04-21 ENCOUNTER — TELEPHONE (OUTPATIENT)
Dept: CARDIOLOGY | Facility: CLINIC | Age: 53
End: 2018-04-21

## 2018-04-21 ENCOUNTER — ALLIED HEALTH/NURSE VISIT (OUTPATIENT)
Dept: CARDIOLOGY | Facility: CLINIC | Age: 53
End: 2018-04-21
Attending: INTERNAL MEDICINE
Payer: COMMERCIAL

## 2018-04-21 PROCEDURE — 93295 DEV INTERROG REMOTE 1/2/MLT: CPT | Performed by: INTERNAL MEDICINE

## 2018-04-22 NOTE — TELEPHONE ENCOUNTER
"Preliminary Device Interrogation Results.  Final physician signed paceart report to be scanned and attached.    [employer] remote ICD transmission received and reviewed. Device transmission sent per MD orders. Pt's wife reports pt was feeling \"fluttering in his chest earlier today\". His presenting rhythm is a regular ventricular rhythm @ 80 bpm. No arrhythmias recorded. Normal device function. = 0%. Lead trends appear stable. Battery estimates 12 years to NEETA. Pt notified of transmission results.   Remote ICD transmission      "

## 2018-05-21 DIAGNOSIS — I50.9 CHRONIC CONGESTIVE HEART FAILURE, UNSPECIFIED CONGESTIVE HEART FAILURE TYPE: ICD-10-CM

## 2018-05-21 RX ORDER — LISINOPRIL 5 MG/1
TABLET ORAL
Qty: 180 TABLET | Refills: 0 | Status: SHIPPED | OUTPATIENT
Start: 2018-05-21 | End: 2018-08-08

## 2018-05-25 ENCOUNTER — ALLIED HEALTH/NURSE VISIT (OUTPATIENT)
Dept: CARDIOLOGY | Facility: CLINIC | Age: 53
End: 2018-05-25
Attending: INTERNAL MEDICINE
Payer: COMMERCIAL

## 2018-05-25 ENCOUNTER — TELEPHONE (OUTPATIENT)
Dept: CARDIOLOGY | Facility: CLINIC | Age: 53
End: 2018-05-25

## 2018-05-25 DIAGNOSIS — I42.8 CARDIOMYOPATHY, NONISCHEMIC (H): Primary | ICD-10-CM

## 2018-05-25 PROCEDURE — 93295 DEV INTERROG REMOTE 1/2/MLT: CPT | Performed by: INTERNAL MEDICINE

## 2018-05-25 PROCEDURE — 93296 REM INTERROG EVL PM/IDS: CPT | Mod: ZF

## 2018-05-25 NOTE — TELEPHONE ENCOUNTER
Preliminary Device Interrogation Results.  Final physician signed paceart report to be scanned and attached.    Received page from patient's wife this evening.  Patient called at home.  Patient's wife reports that at ~ 2:30 pm patient was working when he felt like he may have received a shock from his ICD.  Patient states that he felt fatigued and experienced a tingling sensation in his chest. Remote ICD transmission received and reviewed. Device transmission sent per MD orders.  Patient has a [employer] single lead ICD.  Normal ICD function.  No arrythmias recorded.  Presenting EGM = VS @ 74 bpm.   = 0%.  Estimated battery longevity to NEETA = 12 years.  Patient's wife notified of interrogation results.  She reports that patient is resting right now.  Patient's wife reports that he has had a long, hard, hot work week. Encouraged her to have the patient rest, take it easy and drink plenty of  fluids.  Instructed patient's wife to call with further questions or concerns.      Remote ICD transmission

## 2018-05-25 NOTE — MR AVS SNAPSHOT
After Visit Summary   5/25/2018    Akshat Fragoso    MRN: [id number]           Patient Information     Date Of Birth          1965        Visit Information        Provider Department      5/25/2018 6:00 AM  ICD REMOTE [facility]        Today's Diagnoses     Cardiomyopathy, nonischemic (H)    -  1       Follow-ups after your visit        Who to contact     If you have questions or need follow up information about today's clinic visit or your schedule please contact [facility] directly at [phone].  Normal or non-critical lab and imaging results will be communicated to you by CyberSensehart, letter or phone within 4 business days after the clinic has received the results. If you do not hear from us within 7 days, please contact the clinic through [employer]ing[employer] or phone. If you have a critical or abnormal lab result, we will notify you by phone as soon as possible.  Submit refill requests through Biocontrol or call your pharmacy and they will forward the refill request to us. Please allow 3 business days for your refill to be completed.          Additional Information About Your Visit        MyChart Information     Biocontrol gives you secure access to your electronic health record. If you see a primary care provider, you can also send messages to your care team and make appointments. If you have questions, please call your primary care clinic.  If you do not have a primary care provider, please call [phone] and they will assist you.        Care EveryWhere ID     This is your Care EveryWhere ID. This could be used by other organizations to access your [place] medical records  [id number]         Blood Pressure from Last 3 Encounters:   03/28/18 116/81   03/27/18 128/82   02/20/18 122/74    Weight from Last 3 Encounters:   03/27/18 81.3 kg (179 lb 3.2 oz)   02/20/18 81.6 kg (180 lb)   01/18/18 81.4 kg (179 lb 8 oz)              We Performed the Following     INTERROGATION DEVICE EVAL  REMOTE, PACER/ICD        Primary Care Provider Office Phone # Fax #    Denise White, VERONICA [facility] [phone] [address] [place] [address]        Equal Access to Services     MAXIMINO TALAMANTES : Hadii carlito villa hadrituo Soomaali, waaxda luqadaha, qaybta kaalmada adeegyada, waxniharika juanain haymauran ade parkteri sanchez . So [facility] [phone].    ATENCIÓN: Si habla español, tiene a whipple disposición servicios gratuitos de asistencia lingüística. Llame al [phone].    We comply with applicable federal civil rights laws and [place] laws. We do not discriminate on the basis of race, color, national origin, age, disability, sex, sexual orientation, or gender identity.            Thank you!     Thank you for choosing [facility]  for your care. Our goal is always to provide you with excellent care. Hearing back from our patients is one way we can continue to improve our services. Please take a few minutes to complete the written survey that you may receive in the mail after your visit with us. Thank you!             Your Updated Medication List - Protect others around you: Learn how to safely use, store and throw away your medicines at www.disposemymeds.org.          This list is accurate as of 5/25/18 11:59 PM.  Always use your most recent med list.                   Brand Name Dispense Instructions for use Diagnosis    aspirin 81 MG tablet      Take 81 mg by mouth daily        Blood Pressure Monitor Kit     1 kit    1 kit daily    Chronic congestive heart failure, unspecified congestive heart failure type (H)       * carvedilol 12.5 MG tablet    COREG    180 tablet    Take 1 tablet (12.5 mg) by mouth 2 times daily (with meals)    Chronic systolic congestive heart failure (H)       * carvedilol 6.25 MG tablet    COREG    90 tablet    Take 1.5 tablets (9.375 mg) by mouth 2 times daily (with meals)    Cardiomyopathy (H)       furosemide 20 MG tablet    LASIX    90 tablet    Take 1  tablet (20 mg) by mouth daily as needed Only take if weight up by 3lbs    JOHNSON (dyspnea on exertion), Chronic congestive heart failure, unspecified congestive heart failure type (H), Acute on chronic systolic congestive heart failure (H)       lisinopril 5 MG tablet    PRINIVIL/ZESTRIL    180 tablet    TAKE ONE TABLET BY MOUTH TWICE A DAY    Chronic congestive heart failure, unspecified congestive heart failure type (H)       * oxyCODONE-acetaminophen 5-325 MG per tablet    PERCOCET    84 tablet    1 tablet tid x 2wks, then bid x 2wks, qd x 2wks then dc    Chronic pain in left shoulder       * oxyCODONE-acetaminophen 5-325 MG per tablet    PERCOCET    46 tablet    Take one tablet twice a day as needed for 2 weeks. Then take one tablet per day as needed for 2 weeks. Then take 1/2 tablet per day for 1 weeks. Then stop. Okay to fill today.    Left shoulder pain, unspecified chronicity       spironolactone 25 MG tablet    ALDACTONE    45 tablet    TAKE ONE-HALF TABLET BY MOUTH EVERY DAY    Chronic congestive heart failure, unspecified congestive heart failure type (H)       * Notice:  This list has 4 medication(s) that are the same as other medications prescribed for you. Read the directions carefully, and ask your doctor or other care provider to review them with you.

## 2018-05-30 NOTE — PROGRESS NOTES
Preliminary Device Interrogation Results.  Final physician signed paceart report to be scanned and attached.     Received page from patient's wife this evening.  Patient called at home.  Patient's wife reports that at ~ 2:30 pm patient was working when he felt like he may have received a shock from his ICD.  Patient states that he felt fatigued and experienced a tingling sensation in his chest. Remote ICD transmission received and reviewed. Device transmission sent per MD orders.  Patient has a [employer] single lead ICD.  Normal ICD function.  No arrythmias recorded.  Presenting EGM = VS @ 74 bpm.   = 0%.  Estimated battery longevity to NEETA = 12 years.  Patient's wife notified of interrogation results.  She reports that patient is resting right now.  Patient's wife reports that he has had a long, hard, hot work week. Encouraged her to have the patient rest, take it easy and drink plenty of  fluids.  Instructed patient's wife to call with further questions or concerns.       Remote ICD transmission

## 2018-06-26 ENCOUNTER — OFFICE VISIT (OUTPATIENT)
Dept: FAMILY MEDICINE | Facility: CLINIC | Age: 53
End: 2018-06-26
Payer: COMMERCIAL

## 2018-06-26 ENCOUNTER — TELEPHONE (OUTPATIENT)
Dept: FAMILY MEDICINE | Facility: CLINIC | Age: 53
End: 2018-06-26

## 2018-06-26 ENCOUNTER — RADIANT APPOINTMENT (OUTPATIENT)
Dept: GENERAL RADIOLOGY | Facility: CLINIC | Age: 53
End: 2018-06-26
Attending: FAMILY MEDICINE
Payer: COMMERCIAL

## 2018-06-26 VITALS
HEART RATE: 87 BPM | OXYGEN SATURATION: 97 % | RESPIRATION RATE: 16 BRPM | DIASTOLIC BLOOD PRESSURE: 77 MMHG | BODY MASS INDEX: 26.55 KG/M2 | WEIGHT: 169.5 LBS | TEMPERATURE: 96.6 F | SYSTOLIC BLOOD PRESSURE: 118 MMHG

## 2018-06-26 DIAGNOSIS — M79.89 SWELLING OF LEFT HAND: ICD-10-CM

## 2018-06-26 DIAGNOSIS — S60.222A CONTUSION OF LEFT HAND, INITIAL ENCOUNTER: ICD-10-CM

## 2018-06-26 DIAGNOSIS — M79.642 PAIN OF LEFT HAND: Primary | ICD-10-CM

## 2018-06-26 DIAGNOSIS — R11.0 NAUSEA: ICD-10-CM

## 2018-06-26 DIAGNOSIS — M79.642 PAIN OF LEFT HAND: ICD-10-CM

## 2018-06-26 DIAGNOSIS — I42.8 CARDIOMYOPATHY, NONISCHEMIC (H): ICD-10-CM

## 2018-06-26 PROBLEM — I42.9 CARDIOMYOPATHY (H): Status: RESOLVED | Noted: 2017-01-10 | Resolved: 2018-06-26

## 2018-06-26 PROBLEM — I50.21 ACUTE SYSTOLIC HEART FAILURE (H): Status: RESOLVED | Noted: 2017-01-10 | Resolved: 2018-06-26

## 2018-06-26 PROBLEM — Z87.891 PERSONAL HISTORY OF SMOKING: Status: RESOLVED | Noted: 2017-01-04 | Resolved: 2018-06-26

## 2018-06-26 PROCEDURE — 73130 X-RAY EXAM OF HAND: CPT | Mod: LT

## 2018-06-26 PROCEDURE — 99214 OFFICE O/P EST MOD 30 MIN: CPT | Performed by: FAMILY MEDICINE

## 2018-06-26 RX ORDER — HYDROCODONE BITARTRATE AND ACETAMINOPHEN 5; 325 MG/1; MG/1
1 TABLET ORAL EVERY 4 HOURS PRN
Qty: 5 TABLET | Refills: 0 | Status: SHIPPED | OUTPATIENT
Start: 2018-06-26 | End: 2020-07-30

## 2018-06-26 RX ORDER — ONDANSETRON 4 MG/1
4 TABLET, FILM COATED ORAL EVERY 8 HOURS PRN
Qty: 5 TABLET | Refills: 0 | Status: SHIPPED | OUTPATIENT
Start: 2018-06-26 | End: 2018-07-01

## 2018-06-26 NOTE — TELEPHONE ENCOUNTER
Reason for Call:  Other call back    Detailed comments: Patient was seen today at [facility] for left hand pain and stated he would like to follow up with PCP who is out of clinic until 7/10/2018. States he was unable to get pain medication today as there was a note from PCP indicating not to. He is requesting a call back from her when she is available. After the call ended, writer called patient back to inform him 2 medications were prescribed today. Unsure what he is specifically needing but please return call when able. Thanks!    Phone Number Patient can be reached at: Home number on file [phone] (home)    Best Time: Any    Can we leave a detailed message on this number? YES    Call taken on 6/26/2018 at 3:24 PM by Dedra Sy

## 2018-06-26 NOTE — PATIENT INSTRUCTIONS
Xray shows no broken bones  Just a contusion & bruise   Will get better usually with time   Will call if radiology sees anything more, prelim say no fracture either  Ice  Elevate  Ace wrap  tylenol 1000 mg three times a day  norco prn # 5 only for severe pain no refills  See ortho today or tomorrow for further evaluation and management  Or can go to walkin at the  ortho or [facility] no appts needed for that   zofran prn nausea

## 2018-06-26 NOTE — TELEPHONE ENCOUNTER
Patient called back to [facility] and was informed that the Norco and Zofran prescriptions had been sent to our pharmacy.  Patient states he does have those.  Sri Mccauley RN

## 2018-06-26 NOTE — MR AVS SNAPSHOT
After Visit Summary   6/26/2018    Akshat Fragoso    MRN: [id number]           Patient Information     Date Of Birth          1965        Visit Information        Provider Department      6/26/2018 2:00 PM Mariaa Goel MD [facility]        Today's Diagnoses     Pain of left hand    -  1    Contusion of left hand, initial encounter        Swelling of left hand        Nausea        Cardiomyopathy, nonischemic (H)          Care Instructions    Xray shows no broken bones  Just a contusion & bruise   Will get better usually with time   Will call if radiology sees anything more, prelim say no fracture either  Ice  Elevate  Ace wrap  tylenol 1000 mg three times a day  norco prn # 5 only for severe pain no refills  See ortho today or tomorrow for further evaluation and management  Or can go to walkin at the  [facility] or [facility] or [facility] no appts needed for that   zofran prn nausea           Follow-ups after your visit        Additional Services     [facility] PT, HAND, AND CHIROPRACTIC REFERRAL       **This order will print in the [facility] Scheduling Office**    Physical Therapy, Hand Therapy and Chiropractic Care are available through:    *[facility]  *[facility]  *[facility]    Call one number to schedule at any of the above locations: [phone].    Your provider has referred you to: Hand Therapy    Indication/Reason for Referral: Hand Pain  Onset of Illness: ?  Therapy Orders: Evaluate and Treat  Special Programs: None  Special Request: None    Lam Fritz      Additional Comments for the Therapist or Chiropractor:     Please be aware that coverage of these services is subject to the terms and limitations of your health insurance plan.  Call member services at your health plan with any benefit or coverage questions.      Please bring the following to your appointment:    *Your personal calendar for scheduling future  appointments  *Comfortable clothing            ORTHO  REFERRAL       [facility] is referring you to the Orthopedic  Services at [facility].       The  Representative will assist you in the coordination of your Orthopedic and Musculoskeletal Care as prescribed by your physician.    The  Representative will call you within 1 business day to help schedule your appointment, or you may contact the  Representative at:    All areas ~ [phone]     Type of Referral : Non Surgical       Timeframe requested: 1 - 2 days    Coverage of these services is subject to the terms and limitations of your health insurance plan.  Please call member services at your health plan with any benefit or coverage questions.      If X-rays, CT or MRI's have been performed, please contact the facility where they were done to arrange for , prior to your scheduled appointment.  Please bring this referral request to your appointment and present it to your specialist.                  Who to contact     If you have questions or need follow up information about today's clinic visit or your schedule please contact [facility] directly at [phone].  Normal or non-critical lab and imaging results will be communicated to you by MyChart, letter or phone within 4 business days after the clinic has received the results. If you do not hear from us within 7 days, please contact the clinic through Teamwork Retailhart or phone. If you have a critical or abnormal lab result, we will notify you by phone as soon as possible.  Submit refill requests through VTX Technology or call your pharmacy and they will forward the refill request to us. Please allow 3 business days for your refill to be completed.          Additional Information About Your Visit        VTX Technology Information     [employer] gives you secure access to your electronic health record. If you see a primary care  provider, you can also send messages to your care team and make appointments. If you have questions, please call your primary care clinic.  If you do not have a primary care provider, please call [phone] and they will assist you.        Care EveryWhere ID     This is your Care EveryWhere ID. This could be used by other organizations to access your [facility] medical records  [id number]        Your Vitals Were     Pulse Temperature Respirations Pulse Oximetry BMI (Body Mass Index)       87 96.6  F (35.9  C) (Tympanic) 16 97% 26.55 kg/m2        Blood Pressure from Last 3 Encounters:   06/26/18 118/77   03/28/18 116/81   03/27/18 128/82    Weight from Last 3 Encounters:   06/26/18 169 lb 8 oz (76.9 kg)   03/27/18 179 lb 3.2 oz (81.3 kg)   02/20/18 180 lb (81.6 kg)              We Performed the Following     [facility] PT, HAND, AND CHIROPRACTIC REFERRAL     ORTHO  REFERRAL          Today's Medication Changes          These changes are accurate as of 6/26/18  2:34 PM.  If you have any questions, ask your nurse or doctor.               Start taking these medicines.        Dose/Directions    HYDROcodone-acetaminophen 5-325 MG per tablet   Commonly known as:  NORCO   Used for:  Pain of left hand   Started by:  Mariaa Goel MD        Dose:  1 tablet   Take 1 tablet by mouth every 4 hours as needed for pain   Quantity:  5 tablet   Refills:  0       ondansetron 4 MG tablet   Commonly known as:  ZOFRAN   Used for:  Nausea   Started by:  Mariaa Goel MD        Dose:  4 mg   Take 1 tablet (4 mg) by mouth every 8 hours as needed for nausea   Quantity:  5 tablet   Refills:  0            Where to get your medicines      These medications were sent to [facility] [place], [place] - [address]  [address], [facility] [address]     Phone:  [phone]     ondansetron 4 MG tablet         Some of these will need a paper prescription and others can be bought over the counter.  Ask your nurse if you  have questions.     Bring a paper prescription for each of these medications     HYDROcodone-acetaminophen 5-325 MG per tablet               Information about OPIOIDS     PRESCRIPTION OPIOIDS: WHAT YOU NEED TO KNOW   We gave you an opioid (narcotic) pain medicine. It is important to manage your pain, but opioids are not always the best choice. You should first try all the other options your care team gave you. Take this medicine for as short a time (and as few doses) as possible.     These medicines have risks:    DO NOT drive when on new or higher doses of pain medicine. These medicines can affect your alertness and reaction times, and you could be arrested for driving under the influence (DUI). If you need to use opioids long-term, talk to your care team about driving.    DO NOT operate heave machinery    DO NOT do any other dangerous activities while taking these medicines.     DO NOT drink any alcohol while taking these medicines.      If the opioid prescribed includes acetaminophen, DO NOT take with any other medicines that contain acetaminophen. Read all labels carefully. Look for the word  acetaminophen  or  Tylenol.  Ask your pharmacist if you have questions or are unsure.    You can get addicted to pain medicines, especially if you have a history of addiction (chemical, alcohol or substance dependence). Talk to your care team about ways to reduce this risk.    Store your pills in a secure place, locked if possible. We will not replace any lost or stolen medicine. If you don t finish your medicine, please throw away (dispose) as directed by your pharmacist. The [place] Pollution Control Agency has more information about safe disposal: https://www.pca.[facility].[place]/living-green/managing-unwanted-medications.     All opioids tend to cause constipation. Drink plenty of water and eat foods that have a lot of fiber, such as fruits, vegetables, prune juice, apple juice and high-fiber cereal. Take a laxative  (Miralax, milk of magnesia, Colace, Senna) if you don t move your bowels at least every other day.          Primary Care Provider Office Phone # Fax #    Denise Nagyjosenbdenis [facility] [phone] [address] [place] [address]        Equal Access to Services     [facility] : Hadii aad ku hadasho Soomaali, waaxda luqadaha, qaybta kaalmada adeegyada, waxay idiin hayaan adeeg kharash laruth . So [facility] [phone].    ATENCIÓN: Si habla español, tiene a whipple disposición servicios gratuitos de asistencia lingüística. Lacho al [phone].    We comply with applicable federal civil rights laws and [place] laws. We do not discriminate on the basis of race, color, national origin, age, disability, sex, sexual orientation, or gender identity.            Thank you!     Thank you for choosing [facility]  for your care. Our goal is always to provide you with excellent care. Hearing back from our patients is one way we can continue to improve our services. Please take a few minutes to complete the written survey that you may receive in the mail after your visit with us. Thank you!             Your Updated Medication List - Protect others around you: Learn how to safely use, store and throw away your medicines at www.disposemymeds.org.          This list is accurate as of 6/26/18  2:34 PM.  Always use your most recent med list.                   Brand Name Dispense Instructions for use Diagnosis    aspirin 81 MG tablet      Take 81 mg by mouth daily        Blood Pressure Monitor Kit     1 kit    1 kit daily    Chronic congestive heart failure, unspecified congestive heart failure type (H)       * carvedilol 12.5 MG tablet    COREG    180 tablet    Take 1 tablet (12.5 mg) by mouth 2 times daily (with meals)    Chronic systolic congestive heart failure (H)       * carvedilol 6.25 MG tablet    COREG    90 tablet    Take 1.5 tablets (9.375 mg) by mouth 2 times daily (with meals)     Cardiomyopathy (H)       furosemide 20 MG tablet    LASIX    90 tablet    Take 1 tablet (20 mg) by mouth daily as needed Only take if weight up by 3lbs    JOHNSON (dyspnea on exertion), Chronic congestive heart failure, unspecified congestive heart failure type (H), Acute on chronic systolic congestive heart failure (H)       HYDROcodone-acetaminophen 5-325 MG per tablet    NORCO    5 tablet    Take 1 tablet by mouth every 4 hours as needed for pain    Pain of left hand       lisinopril 5 MG tablet    PRINIVIL/ZESTRIL    180 tablet    TAKE ONE TABLET BY MOUTH TWICE A DAY    Chronic congestive heart failure, unspecified congestive heart failure type (H)       ondansetron 4 MG tablet    ZOFRAN    5 tablet    Take 1 tablet (4 mg) by mouth every 8 hours as needed for nausea    Nausea       spironolactone 25 MG tablet    ALDACTONE    45 tablet    TAKE ONE-HALF TABLET BY MOUTH EVERY DAY    Chronic congestive heart failure, unspecified congestive heart failure type (H)       * Notice:  This list has 2 medication(s) that are the same as other medications prescribed for you. Read the directions carefully, and ask your doctor or other care provider to review them with you.
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
## 2018-07-17 ENCOUNTER — TELEPHONE (OUTPATIENT)
Dept: FAMILY MEDICINE | Facility: CLINIC | Age: 53
End: 2018-07-17

## 2018-07-17 NOTE — TELEPHONE ENCOUNTER
7/17/2018    Call Regarding Preventive Health Screening Colonoscopy       Attempt 2    Message on voicemail     Comments:       Outreach   SV

## 2018-07-25 ENCOUNTER — CARE COORDINATION (OUTPATIENT)
Dept: CARDIOLOGY | Facility: CLINIC | Age: 53
End: 2018-07-25

## 2018-07-25 NOTE — PROGRESS NOTES
Patient requesting recommendation for cardiologist to transfer care related to Dr. Cardona no longer being a provider here. Patient called and voice message stating to follow up with Dr. Aguiar. Of note, patient did not show up with his appointment with Dr. Cardona on 7/13/18. That was Dr. Cardona's last clinic. Scheduling number left with patient on voice mail.

## 2018-08-06 DIAGNOSIS — I42.9 CARDIOMYOPATHY (H): ICD-10-CM

## 2018-08-06 RX ORDER — CARVEDILOL 6.25 MG/1
TABLET ORAL
Qty: 90 TABLET | Refills: 3 | Status: CANCELLED | OUTPATIENT
Start: 2018-08-06

## 2018-08-08 ENCOUNTER — TELEPHONE (OUTPATIENT)
Dept: CARDIOLOGY | Facility: CLINIC | Age: 53
End: 2018-08-08

## 2018-08-08 DIAGNOSIS — I50.9 CHRONIC CONGESTIVE HEART FAILURE, UNSPECIFIED CONGESTIVE HEART FAILURE TYPE: ICD-10-CM

## 2018-08-08 DIAGNOSIS — I42.2 HYPERTROPHIC NONOBSTRUCTIVE CARDIOMYOPATHY (H): ICD-10-CM

## 2018-08-08 RX ORDER — LISINOPRIL 5 MG/1
TABLET ORAL
Qty: 180 TABLET | Refills: 0 | Status: SHIPPED | OUTPATIENT
Start: 2018-08-08 | End: 2018-11-09

## 2018-08-08 RX ORDER — CARVEDILOL 6.25 MG/1
9.38 TABLET ORAL 2 TIMES DAILY WITH MEALS
Qty: 90 TABLET | Refills: 0 | Status: SHIPPED | OUTPATIENT
Start: 2018-08-08 | End: 2018-09-04

## 2018-08-08 RX ORDER — LISINOPRIL 5 MG/1
5 TABLET ORAL 2 TIMES DAILY
Qty: 180 TABLET | Refills: 0 | Status: CANCELLED | OUTPATIENT
Start: 2018-08-08

## 2018-08-08 NOTE — TELEPHONE ENCOUNTER
Health Call Center    Phone Message    May a detailed message be left on voicemail: no    Reason for Call: Medication Refill Request    Has the patient contacted the pharmacy for the refill? Yes   Name of medication being requested: lisinopril (PRINIVIL/ZESTRIL) 5 MG tablet, carvedilol (COREG) 6.25 MG tablet  Provider who prescribed the medication: Dr. Cardona, but Pt switching to Dr. Aguiar  Pharmacy: [facility]  Date medication is needed: Asap  Comments: Pt has appt on 8/16/18 with Dr. Aguiar      Action Taken: Message routed to:  [facility] CARDIOLOGY ADULT [facility]

## 2018-08-08 NOTE — TELEPHONE ENCOUNTER
Will approve one month supply of most current dose 6.25mg tablets, 1.5 tabs BID.  Pt says he is out and will not see the cardiologist for 10 days.  Passed protocol for beta blockers.    Cristian Carver, PharmD  [facility]   [facility] Pharmacist on behalf of [facility]

## 2018-09-04 DIAGNOSIS — I42.2 HYPERTROPHIC NONOBSTRUCTIVE CARDIOMYOPATHY (H): ICD-10-CM

## 2018-09-04 RX ORDER — CARVEDILOL 6.25 MG/1
9.38 TABLET ORAL 2 TIMES DAILY WITH MEALS
Qty: 270 TABLET | Refills: 1 | Status: SHIPPED | OUTPATIENT
Start: 2018-09-04 | End: 2018-11-09

## 2018-09-20 ENCOUNTER — ALLIED HEALTH/NURSE VISIT (OUTPATIENT)
Dept: CARDIOLOGY | Facility: CLINIC | Age: 53
End: 2018-09-20
Attending: INTERNAL MEDICINE
Payer: COMMERCIAL

## 2018-09-20 DIAGNOSIS — I42.8 CARDIOMYOPATHY, NONISCHEMIC (H): Primary | ICD-10-CM

## 2018-09-20 PROCEDURE — 93296 REM INTERROG EVL PM/IDS: CPT | Mod: ZF

## 2018-09-20 PROCEDURE — 93295 DEV INTERROG REMOTE 1/2/MLT: CPT | Performed by: INTERNAL MEDICINE

## 2018-09-20 NOTE — MR AVS SNAPSHOT
After Visit Summary   9/20/2018    Akshat Fragoso    MRN: [id number]           Patient Information     Date Of Birth          1965        Visit Information        Provider Department      9/20/2018 6:00 AM  ICD [facility]        Today's Diagnoses     Cardiomyopathy, nonischemic (H)    -  1       Follow-ups after your visit        Your next 10 appointments already scheduled     Oct 26, 2018 11:30 AM CDT   Return Visit with Shaun Aguiar MD   [facility] ([facility])    [address] [place] [id number] [phone]              Who to contact     If you have questions or need follow up information about today's clinic visit or your schedule please contact [facility] directly at [phone].  Normal or non-critical lab and imaging results will be communicated to you by MyChart, letter or phone within 4 business days after the clinic has received the results. If you do not hear from us within 7 days, please contact the clinic through [employer] or phone. If you have a critical or abnormal lab result, we will notify you by phone as soon as possible.  Submit refill requests through [employer] or call your pharmacy and they will forward the refill request to us. Please allow 3 business days for your refill to be completed.          Additional Information About Your Visit        MyChart Information     [employer] gives you secure access to your electronic health record. If you see a primary care provider, you can also send messages to your care team and make appointments. If you have questions, please call your primary care clinic.  If you do not have a primary care provider, please call [phone] and they will assist you.        Care EveryWhere ID     This is your Care EveryWhere ID. This could be used by other organizations to access your [facility] medical records  [id number]         Blood Pressure from  Last 3 Encounters:   06/26/18 118/77   03/28/18 116/81   03/27/18 128/82    Weight from Last 3 Encounters:   06/26/18 76.9 kg (169 lb 8 oz)   03/27/18 81.3 kg (179 lb 3.2 oz)   02/20/18 81.6 kg (180 lb)              We Performed the Following     INTERROGATION DEVICE EVAL REMOTE, PACER/ICD        Primary Care Provider Office Phone # Fax #    Denise HICKS Walterdenis, APRN [phone] [address] [place] [address]        Equal Access to Services     [facility] : Hadii aad ku hadasho Sojay, waaxda luqadaha, qaybta kaalmada adeegyada, kieran sanchez . So [facility] [phone].    ATENCIÓN: Si habla español, tiene a whipple disposición servicios gratuitos de asistencia lingüística. Llame al [phone].    We comply with applicable federal civil rights laws and [place] laws. We do not discriminate on the basis of race, color, national origin, age, disability, sex, sexual orientation, or gender identity.            Thank you!     Thank you for choosing [facility]  for your care. Our goal is always to provide you with excellent care. Hearing back from our patients is one way we can continue to improve our services. Please take a few minutes to complete the written survey that you may receive in the mail after your visit with us. Thank you!             Your Updated Medication List - Protect others around you: Learn how to safely use, store and throw away your medicines at www.disposemymeds.org.          This list is accurate as of 9/20/18 11:59 PM.  Always use your most recent med list.                   Brand Name Dispense Instructions for use Diagnosis    aspirin 81 MG tablet      Take 81 mg by mouth daily        Blood Pressure Monitor Kit     1 kit    1 kit daily    Chronic congestive heart failure, unspecified congestive heart failure type (H)       carvedilol 6.25 MG tablet    COREG    270 tablet    Take 1.5 tablets (9.375 mg) by mouth 2 times  daily (with meals)    Hypertrophic nonobstructive cardiomyopathy (H)       furosemide 20 MG tablet    LASIX    90 tablet    Take 1 tablet (20 mg) by mouth daily as needed Only take if weight up by 3lbs    JOHNSON (dyspnea on exertion), Chronic congestive heart failure, unspecified congestive heart failure type (H), Acute on chronic systolic congestive heart failure (H)       lisinopril 5 MG tablet    PRINIVIL/ZESTRIL    180 tablet    TAKE ONE TABLET BY MOUTH TWICE A DAY    Chronic congestive heart failure, unspecified congestive heart failure type (H)       spironolactone 25 MG tablet    ALDACTONE    45 tablet    TAKE ONE-HALF TABLET BY MOUTH EVERY DAY    Chronic congestive heart failure, unspecified congestive heart failure type (H)

## 2018-09-24 NOTE — PROGRESS NOTES
Preliminary Device Interrogation Results.  Final physician signed paceart report to be scanned and attached.    Scheduled [employer], single chamber ICD, remote transmission received and reviewed. Device transmission sent per MD orders. His presenting rhythm is SR 70 bpm. 1 VT episodes recorded - 172 bpm, 13 seconds in duration. = 0%. Lead trends appear stable. Battery estimates 12 years to NEETA. Pt notified of transmission results. Plan for pt to send another transmission in 3 months as scheduled.  Remote ICD transmission

## 2018-10-26 ENCOUNTER — OFFICE VISIT (OUTPATIENT)
Dept: CARDIOLOGY | Facility: CLINIC | Age: 53
End: 2018-10-26
Payer: COMMERCIAL

## 2018-10-26 VITALS
DIASTOLIC BLOOD PRESSURE: 64 MMHG | BODY MASS INDEX: 27.57 KG/M2 | SYSTOLIC BLOOD PRESSURE: 94 MMHG | WEIGHT: 176 LBS | OXYGEN SATURATION: 95 % | HEART RATE: 89 BPM

## 2018-10-26 DIAGNOSIS — I50.22 CHRONIC SYSTOLIC CONGESTIVE HEART FAILURE (H): ICD-10-CM

## 2018-10-26 DIAGNOSIS — I50.22 CHRONIC SYSTOLIC (CONGESTIVE) HEART FAILURE (H): ICD-10-CM

## 2018-10-26 DIAGNOSIS — R06.09 DOE (DYSPNEA ON EXERTION): ICD-10-CM

## 2018-10-26 DIAGNOSIS — I50.23 ACUTE ON CHRONIC SYSTOLIC CONGESTIVE HEART FAILURE (H): ICD-10-CM

## 2018-10-26 DIAGNOSIS — I50.20 HEART FAILURE WITH REDUCED EJECTION FRACTION, NYHA CLASS III (H): ICD-10-CM

## 2018-10-26 DIAGNOSIS — I10 BENIGN ESSENTIAL HYPERTENSION: ICD-10-CM

## 2018-10-26 DIAGNOSIS — R07.81 RIB PAIN: Primary | ICD-10-CM

## 2018-10-26 PROCEDURE — 99214 OFFICE O/P EST MOD 30 MIN: CPT | Mod: GC | Performed by: INTERNAL MEDICINE

## 2018-10-26 RX ORDER — OXYCODONE HYDROCHLORIDE 10 MG/1
10 TABLET ORAL EVERY 6 HOURS PRN
Qty: 30 TABLET | Refills: 0 | Status: SHIPPED | OUTPATIENT
Start: 2018-10-26 | End: 2018-10-26

## 2018-10-26 RX ORDER — ALBUTEROL SULFATE 90 UG/1
2 AEROSOL, METERED RESPIRATORY (INHALATION) EVERY 4 HOURS PRN
Qty: 1 INHALER | Refills: 1 | Status: SHIPPED | OUTPATIENT
Start: 2018-10-26 | End: 2018-11-21

## 2018-10-26 RX ORDER — FUROSEMIDE 20 MG
20 TABLET ORAL 2 TIMES DAILY
Qty: 90 TABLET | Refills: 3 | Status: SHIPPED | OUTPATIENT
Start: 2018-10-26 | End: 2019-07-03

## 2018-10-26 RX ORDER — FUROSEMIDE 20 MG
40 TABLET ORAL 2 TIMES DAILY
Qty: 90 TABLET | Refills: 3 | Status: SHIPPED | OUTPATIENT
Start: 2018-10-26 | End: 2018-10-26

## 2018-10-26 RX ORDER — OXYCODONE HYDROCHLORIDE 10 MG/1
10 TABLET ORAL EVERY 6 HOURS PRN
Qty: 30 TABLET | Refills: 0 | Status: SHIPPED | OUTPATIENT
Start: 2018-10-26 | End: 2018-11-16

## 2018-10-26 NOTE — PROGRESS NOTES
"October 26, 2018      Akshat Fragoso is a pleasant 53 year old male with a past medical history of nonischemic card myopathy (stage C, NYHA III) who presented for routine follow-up.    In the last month, Mr. Fragoso notes that he has had progressive exertional dyspnea, abdominal distention, and fatigue.  He had gone back to work recently and noted that his symptoms were limiting his occupational activities.  Mr. Fragoso also notes that he had 3 pillow orthopnea in the last month and suspects that his weight has gone up by about 10 pounds from his dry weight.  Mr. Fragoso has not, however, check his weight at home so does not know the speed with which this is progressed.  Mr. Fragoso did try to take extra dose of his furosemide in the last week and with 20 mg once a day did note some improvement.  He, however, still feels that he has \"a long way to go\".    Mr. Fragoso is also been experiencing a lot of coughing at night and has left hemithoracic pain.  Mr. Fragoso did not have any episodes of presyncope or syncope.  ROS otherwise negative    PAST MEDICAL HISTORY:  - Nonischemic cardiomyopathy   - Stage C, NYHA III   - Suspected to be due to myocarditis  - Right lumbar radiculopathy    CURRENT MEDICATIONS:  Home cardiac meds: Aspirin 81 mg once daily, carvedilol 9.375 mg twice daily, furosemide 20 mg as needed, lisinopril 5 mg twice daily, spironolactone 12.5 mg once daily  Current Outpatient Prescriptions   Medication Sig Dispense Refill     aspirin 81 MG tablet Take 81 mg by mouth daily       Blood Pressure Monitor KIT 1 kit daily 1 kit 0     carvedilol (COREG) 6.25 MG tablet Take 1.5 tablets (9.375 mg) by mouth 2 times daily (with meals) 270 tablet 1     furosemide (LASIX) 20 MG tablet Take 1 tablet (20 mg) by mouth daily as needed Only take if weight up by 3lbs 90 tablet 3     lisinopril (PRINIVIL/ZESTRIL) 5 MG tablet TAKE ONE TABLET BY MOUTH TWICE A  tablet 0     spironolactone (ALDACTONE) 25 MG tablet TAKE " ONE-HALF TABLET BY MOUTH EVERY DAY 45 tablet 3         EXAM:  BP 94/64 (BP Location: Left arm, Patient Position: Sitting, Cuff Size: Adult Large)  Pulse 89  Wt 79.8 kg (176 lb)  SpO2 95%  BMI 27.57 kg/m2    GENERAL APPEARANCE: healthy, alert and no distress  RESPIRATORY: No signs of resp distress. Lungs with rhonchi.  Left hemithoracic chest pain reproduced by palpation over axilla and apical area.  CARDIOVASCULAR:  JVP to 12 cm above clavicle, regular, normal S1+S2 without added sounds or murmurs.  ABDOMEN: ND, soft, non-tender, normal bowel sounds   EXTREMITIES: Warm, well-perfused, no edema   NEUROLOGY: GCS 15/15, pleasant affect.    Weight  Wt Readings from Last 10 Encounters:   10/26/18 79.8 kg (176 lb)   06/26/18 76.9 kg (169 lb 8 oz)   03/27/18 81.3 kg (179 lb 3.2 oz)   02/20/18 81.6 kg (180 lb)   01/18/18 81.4 kg (179 lb 8 oz)   12/19/17 77.2 kg (170 lb 4 oz)   11/21/17 76.2 kg (168 lb)   10/27/17 77.9 kg (171 lb 12.8 oz)   09/20/17 76.2 kg (168 lb)   09/18/17 76.7 kg (169 lb 3.2 oz)       Labs:    CBC RESULTS:  Lab Results   Component Value Date    WBC 14.9 (H) 06/08/2017    RBC 4.88 06/08/2017    HGB 15.6 06/08/2017    HCT 47.0 06/08/2017    MCV 96 06/08/2017    MCH 32.0 06/08/2017    MCHC 33.2 06/08/2017    RDW 13.9 06/08/2017     06/08/2017       CMP RESULTS:  Lab Results   Component Value Date     06/08/2017    POTASSIUM 4.2 06/08/2017    CHLORIDE 104 06/08/2017    CO2 27 06/08/2017    ANIONGAP 7 06/08/2017     (H) 06/08/2017    BUN 24 06/08/2017    CR 0.90 06/08/2017    GFRESTIMATED 90 06/08/2017    GFRESTBLACK >90   GFR Calc   06/08/2017    TONY 9.0 06/08/2017    BILITOTAL 0.5 01/09/2017    ALBUMIN 3.5 01/09/2017    ALKPHOS 84 01/09/2017     (H) 01/09/2017    AST 55 (H) 01/09/2017        INR RESULTS:  Lab Results   Component Value Date    INR 1.12 01/12/2017       BNP RESULTS:  Lab Results   Component Value Date    NTBNPI 62309 (H) 01/09/2017        Assessment and Plan:   Akshat Fragoso is a 53 year old male with a PMHx of nonischemic cardiomyopathy (stage C, NYHA III) who presented for follow-up with symptoms suggestive of decompensation.    Mr. Fragoso exhibits signs of volume overload and his coughing is most likely due to the pulmonary edema that he has.  As his blood pressure is currently well controlled and his pulse would probably preclude raising the dose of his beta blockade, we will focus on treating his volume symptoms.  We will also bring him back for early follow-up to ensure that his symptoms are improving.    - Nonischemic cardiomyopathy   - Increase furosemide dosage to 20 mg twice daily for the next 3 days   - Advised patient to call [place] clinic with progress on Monday, October 29   - Continue current doses of carvedilol 9.375 mg twice daily, spironolactone 12.5 mg once daily, lisinopril 5 mg twice daily in the interim    - Initiate advance heart failure therapy evaluations.    Return to clinic in two weeks' time.  Seen and staffed with Dr. Aguiar.    Sincerely,    Mekhi Ferrera   Cardiology Fellow    I have seen and evaluated the patient with Dr. Ferrera and agree with the assessment and plan as documented in the note.  Shaun Aguiar MD    CC  Patient Care Team:  Denise White APRN CNP as PCP - General (Nurse Practitioner)  Brynn Moy MD as Referring Physician (Cardiology)  Crescencio Kimball MD as MD (Critical Care Medicine)  Saloni Martin, WILDER as Nurse Coordinator (Clinical Cardiac Electrophysiology)  Mando Kay, WILDER as Nurse Coordinator (Cardiology)  Jacki Leyva NP as Nurse Practitioner (Nurse Practitioner - Adult Health)

## 2018-10-26 NOTE — MR AVS SNAPSHOT
After Visit Summary   10/26/2018    Akshat Fragoso    MRN: [id number]           Patient Information     Date Of Birth          1965        Visit Information        Provider Department      10/26/2018 11:30 AM Shaun Aguiar MD [facility] PHYSICIANS HEART AT [facility]        Today's Diagnoses     JOHNSON (dyspnea on exertion)        Acute on chronic systolic congestive heart failure (H)          Care Instructions    Thank you for coming to the [facility] Heart @ [facility]; please note the following instructions:    1. Increase your lasix to 20mg twice a day.    2. Follow up with Dr. Aguiar in 3 weeks. This is scheduled for Friday, November 16th, 2018 at 10:45am.      If you have any questions regarding your visit please contact your care team:     Cardiology  Telephone Number   Leslie TOVAR, WILDER OLMOS, WILDER TORRES, GARY TOVAR, MA  Yolis MARTINEZ, ALIYAHN   [phone]    *After hours: [phone]   For scheduling appts:     [phone] or    [phone] (select option 1)    *After hours: [phone]     For the Device Clinic (Pacemakers and ICD's)  RN's :  Alexandra Vu   During business hours: [phone]    *After business hours:  [phone] (select option 4)      Normal test result notifications will be released via ToonTime or mailed within 7 business days.  All other test results, will be communicated via telephone once reviewed by your cardiologist.    If you need a medication refill please contact your pharmacy.  Please allow 3 business days for your refill to be completed.    As always, thank you for trusting us with your health care needs!                Follow-ups after your visit        Your next 10 appointments already scheduled     Nov 16, 2018 10:45 AM CST   Return Visit with Shaun Aguiar MD   [facility] PHYSICIANS HEART AT [facility] ([facility])    [address] [place] [address] [id number] [phone]               Who to contact     If you have questions or need follow up information about today's clinic visit or your schedule please contact [facility] PHYSICIANS HEART AT [facility] directly at [phone].  Normal or non-critical lab and imaging results will be communicated to you by MyChart, letter or phone within 4 business days after the clinic has received the results. If you do not hear from us within 7 days, please contact the clinic through MyChart or phone. If you have a critical or abnormal lab result, we will notify you by phone as soon as possible.  Submit refill requests through Demibooks or call your pharmacy and they will forward the refill request to us. Please allow 3 business days for your refill to be completed.          Additional Information About Your Visit        Harry and Davidhart Information     Demibooks gives you secure access to your electronic health record. If you see a primary care provider, you can also send messages to your care team and make appointments. If you have questions, please call your primary care clinic.  If you do not have a primary care provider, please call [phone] and they will assist you.        Care EveryWhere ID     This is your Care EveryWhere ID. This could be used by other organizations to access your [facility] medical records  [id number]        Your Vitals Were     Pulse Pulse Oximetry BMI (Body Mass Index)             89 95% 27.57 kg/m2          Blood Pressure from Last 3 Encounters:   10/26/18 94/64   06/26/18 118/77   03/28/18 116/81    Weight from Last 3 Encounters:   10/26/18 79.8 kg (176 lb)   06/26/18 76.9 kg (169 lb 8 oz)   03/27/18 81.3 kg (179 lb 3.2 oz)              Today, you had the following     No orders found for display         Today's Medication Changes          These changes are accurate as of 10/26/18 12:51 PM.  If you have any questions, ask your nurse or doctor.               Start taking these medicines.        Dose/Directions     furosemide 20 MG tablet   Commonly known as:  LASIX   Used for:  JOHNSON (dyspnea on exertion), Acute on chronic systolic congestive heart failure (H)   Started by:  Shaun Aguiar MD        Dose:  20 mg   Take 1 tablet (20 mg) by mouth 2 times daily Only take if weight up by 3lbs   Quantity:  90 tablet   Refills:  3            Where to get your medicines      These medications were sent to [facility] [address] [place] [address] AT [facility] & [address] [place] [address]     Phone:  [phone]     furosemide 20 MG tablet                Primary Care Provider Office Phone # Fax #    Denise HICKS Christopher, VERONICA [facility] [phone] [address] [place] [address]        Equal Access to Services     MAXIMINO TALAMANTES : Hadii carlito villa hadasho Soomaali, waaxda luqadaha, qaybta kaalmada adeegyada, kieran sanchez . So [facility] [phone].    ATENCIÓN: Si habla español, tiene a whipple disposición servicios gratuitos de asistencia lingüística. Llame al [phone].    We comply with applicable federal civil rights laws and [place] laws. We do not discriminate on the basis of race, color, national origin, age, disability, sex, sexual orientation, or gender identity.            Thank you!     Thank you for choosing [facility] PHYSICIANS HEART AT [facility]  for your care. Our goal is always to provide you with excellent care. Hearing back from our patients is one way we can continue to improve our services. Please take a few minutes to complete the written survey that you may receive in the mail after your visit with us. Thank you!             Your Updated Medication List - Protect others around you: Learn how to safely use, store and throw away your medicines at www.disposemymeds.org.          This list is accurate as of 10/26/18 12:51 PM.  Always use your most recent med list.                   Brand  Name Dispense Instructions for use Diagnosis    aspirin 81 MG tablet      Take 81 mg by mouth daily        Blood Pressure Monitor Kit     1 kit    1 kit daily    Chronic congestive heart failure, unspecified congestive heart failure type       carvedilol 6.25 MG tablet    COREG    270 tablet    Take 1.5 tablets (9.375 mg) by mouth 2 times daily (with meals)    Hypertrophic nonobstructive cardiomyopathy (H)       furosemide 20 MG tablet    LASIX    90 tablet    Take 1 tablet (20 mg) by mouth 2 times daily Only take if weight up by 3lbs    JOHNSON (dyspnea on exertion), Acute on chronic systolic congestive heart failure (H)       lisinopril 5 MG tablet    PRINIVIL/ZESTRIL    180 tablet    TAKE ONE TABLET BY MOUTH TWICE A DAY    Chronic congestive heart failure, unspecified congestive heart failure type       spironolactone 25 MG tablet    ALDACTONE    45 tablet    TAKE ONE-HALF TABLET BY MOUTH EVERY DAY    Chronic congestive heart failure, unspecified congestive heart failure type

## 2018-10-26 NOTE — LETTER
"10/26/2018      RE: Akshat Fragoso  [address]  [facility] [address]       Dear Colleague,    Thank you for the opportunity to participate in the care of your patient, Akshat Fragoso, at the [facility] HEART AT [facility] at [facility]. Please see a copy of my visit note below.    October 26, 2018      Akshat Fragoso is a pleasant 53 year old male with a past medical history of nonischemic card myopathy (stage C, NYHA III) who presented for routine follow-up.    In the last month, Mr. Fragoso notes that he has had progressive exertional dyspnea, abdominal distention, and fatigue.  He had gone back to work recently and noted that his symptoms were limiting his occupational activities.  Mr. Fragoso also notes that he had 3 pillow orthopnea in the last month and suspects that his weight has gone up by about 10 pounds from his dry weight.  Mr. Fragoso has not, however, check his weight at home so does not know the speed with which this is progressed.  Mr. Fragoso did try to take extra dose of his furosemide in the last week and with 20 mg once a day did note some improvement.  He, however, still feels that he has \"a long way to go\".    Mr. Fragoso is also been experiencing a lot of coughing at night and has left hemithoracic pain.  Mr. Fragoso did not have any episodes of presyncope or syncope.  ROS otherwise negative    PAST MEDICAL HISTORY:  - Nonischemic cardiomyopathy   - Stage C, NYHA III   - Suspected to be due to myocarditis  - Right lumbar radiculopathy    CURRENT MEDICATIONS:  Home cardiac meds: Aspirin 81 mg once daily, carvedilol 9.375 mg twice daily, furosemide 20 mg as needed, lisinopril 5 mg twice daily, spironolactone 12.5 mg once daily  Current Outpatient Prescriptions   Medication Sig Dispense Refill     aspirin 81 MG tablet Take 81 mg by mouth daily       Blood Pressure Monitor KIT 1 kit daily 1 kit 0     carvedilol (COREG) 6.25 MG " tablet Take 1.5 tablets (9.375 mg) by mouth 2 times daily (with meals) 270 tablet 1     furosemide (LASIX) 20 MG tablet Take 1 tablet (20 mg) by mouth daily as needed Only take if weight up by 3lbs 90 tablet 3     lisinopril (PRINIVIL/ZESTRIL) 5 MG tablet TAKE ONE TABLET BY MOUTH TWICE A  tablet 0     spironolactone (ALDACTONE) 25 MG tablet TAKE ONE-HALF TABLET BY MOUTH EVERY DAY 45 tablet 3         EXAM:  BP 94/64 (BP Location: Left arm, Patient Position: Sitting, Cuff Size: Adult Large)  Pulse 89  Wt 79.8 kg (176 lb)  SpO2 95%  BMI 27.57 kg/m2    GENERAL APPEARANCE: healthy, alert and no distress  RESPIRATORY: No signs of resp distress. Lungs with rhonchi.  Left hemithoracic chest pain reproduced by palpation over axilla and apical area.  CARDIOVASCULAR:  JVP to 12 cm above clavicle, regular, normal S1+S2 without added sounds or murmurs.  ABDOMEN: ND, soft, non-tender, normal bowel sounds   EXTREMITIES: Warm, well-perfused, no edema   NEUROLOGY: GCS 15/15, pleasant affect.    Weight  Wt Readings from Last 10 Encounters:   10/26/18 79.8 kg (176 lb)   06/26/18 76.9 kg (169 lb 8 oz)   03/27/18 81.3 kg (179 lb 3.2 oz)   02/20/18 81.6 kg (180 lb)   01/18/18 81.4 kg (179 lb 8 oz)   12/19/17 77.2 kg (170 lb 4 oz)   11/21/17 76.2 kg (168 lb)   10/27/17 77.9 kg (171 lb 12.8 oz)   09/20/17 76.2 kg (168 lb)   09/18/17 76.7 kg (169 lb 3.2 oz)       Labs:    CBC RESULTS:  Lab Results   Component Value Date    WBC 14.9 (H) 06/08/2017    RBC 4.88 06/08/2017    HGB 15.6 06/08/2017    HCT 47.0 06/08/2017    MCV 96 06/08/2017    MCH 32.0 06/08/2017    MCHC 33.2 06/08/2017    RDW 13.9 06/08/2017     06/08/2017       CMP RESULTS:  Lab Results   Component Value Date     06/08/2017    POTASSIUM 4.2 06/08/2017    CHLORIDE 104 06/08/2017    CO2 27 06/08/2017    ANIONGAP 7 06/08/2017     (H) 06/08/2017    BUN 24 06/08/2017    CR 0.90 06/08/2017    GFRESTIMATED 90 06/08/2017    GFRESTBLACK >90    American GFR Calc   06/08/2017    TONY 9.0 06/08/2017    BILITOTAL 0.5 01/09/2017    ALBUMIN 3.5 01/09/2017    ALKPHOS 84 01/09/2017     (H) 01/09/2017    AST 55 (H) 01/09/2017        INR RESULTS:  Lab Results   Component Value Date    INR 1.12 01/12/2017       BNP RESULTS:  Lab Results   Component Value Date    NTBNPI 46801 (H) 01/09/2017       Assessment and Plan:   Akshat Fragoso is a 53 year old male with a PMHx of nonischemic cardiomyopathy (stage C, NYHA III) who presented for follow-up with symptoms suggestive of decompensation.    Mr. Fragoso exhibits signs of volume overload and his coughing is most likely due to the pulmonary edema that he has.  As his blood pressure is currently well controlled and his pulse would probably preclude raising the dose of his beta blockade, we will focus on treating his volume symptoms.  We will also bring him back for early follow-up to ensure that his symptoms are improving.    - Nonischemic cardiomyopathy   - Increase furosemide dosage to 20 mg twice daily for the next 3 days   - Advised patient to call [facility] with progress on Monday, October 29   - Continue current doses of carvedilol 9.375 mg twice daily, spironolactone 12.5 mg once daily, lisinopril 5 mg twice daily in the interim    - Initiate advance heart failure therapy evaluations.    Return to clinic in two weeks' time.  Seen and staffed with Dr. Aguiar.    Sincerely,    Mekhi Ferrera   Cardiology Fellow    I have seen and evaluated the patient with Dr. Ferrera and agree with the assessment and plan as documented in the note.    Shaun Aguiar MD    CC  Patient Care Team:  Denise White APRN CNP as PCP - General (Nurse Practitioner)  Brynn Moy MD as Referring Physician (Cardiology)  Crescencio Kimball MD as MD (Critical Care Medicine)  Saloni Martin RN as Nurse Coordinator (Clinical Cardiac Electrophysiology)  Mando Kay, RN as Nurse Coordinator (Cardiology)  Jacki Leyva,  NP as Nurse Practitioner (Nurse Practitioner - Adult Health)

## 2018-10-26 NOTE — PATIENT INSTRUCTIONS
Thank you for coming to the [facility] Heart @ [facility]; please note the following instructions:    1. Increase your lasix to 20mg twice a day.    2. Follow up with Dr. Aguiar in 3 weeks. This is scheduled for Friday, November 16th, 2018 at 10:45am.      If you have any questions regarding your visit please contact your care team:     Cardiology  Telephone Number   Leslie TOVAR, WILDER OLMOS, WILDER TORRES, GARY TOVAR, PHYLICIA MARTINEZ, ALIYAHN   [phone]    *After hours: [phone]   For scheduling appts:     [phone] or    [phone] (select option 1)    *After hours: [phone]     For the Device Clinic (Pacemakers and ICD's)  RN's :  Alexandra Vu   During business hours: [phone]    *After business hours:  [phone] (select option 4)      Normal test result notifications will be released via DailyLook or mailed within 7 business days.  All other test results, will be communicated via telephone once reviewed by your cardiologist.    If you need a medication refill please contact your pharmacy.  Please allow 3 business days for your refill to be completed.    As always, thank you for trusting us with your health care needs!

## 2018-10-26 NOTE — NURSING NOTE
Med Reconcile: Reviewed and verified all current medications with the patient. The updated medication list was printed and given to the patient.    Return Appointment: Patient given instructions regarding scheduling next clinic visit, in 3 weeks on 11-16-18 at 10:45am. Patient demonstrated understanding of this information and agreed to call with further questions or concerns.    Medication Change: Patient was educated regarding prescribed medication change, increasing lasix to 20mg BID, including discussion of the indication, administration, side effects, and when to report to MD or RN. Patient demonstrated understanding of this information and agreed to call with further questions or concerns.    Patient stated he understood all health information given and agreed to call with further questions or concerns.    Henrique Amezcua, WILDER

## 2018-10-26 NOTE — NURSING NOTE
"Chief Complaint   Patient presents with     Heart Failure     CHF 6+ month follow up and discuss action plan, Chronic systolic congestive heart failure .Per patient chest discomfort,sob.,heart palpitations,lightheaded occur at work,&fatigue and leg/feet pain occur .       Initial BP 94/64 (BP Location: Left arm, Patient Position: Sitting, Cuff Size: Adult Large)  Pulse 89  Wt 79.8 kg (176 lb)  SpO2 95%  BMI 27.57 kg/m2 Estimated body mass index is 27.57 kg/(m^2) as calculated from the following:    Height as of 1/18/18: 1.702 m (5' 7\").    Weight as of this encounter: 79.8 kg (176 lb)..  BP completed using cuff size: davide Greenwood L.P.N.    "

## 2018-10-29 ENCOUNTER — TELEPHONE (OUTPATIENT)
Dept: GASTROENTEROLOGY | Facility: CLINIC | Age: 53
End: 2018-10-29

## 2018-10-29 DIAGNOSIS — I42.8 NICM (NONISCHEMIC CARDIOMYOPATHY) (H): Primary | ICD-10-CM

## 2018-11-01 ENCOUNTER — TELEPHONE (OUTPATIENT)
Dept: CARDIOLOGY | Facility: CLINIC | Age: 53
End: 2018-11-01

## 2018-11-01 NOTE — TELEPHONE ENCOUNTER
Patient has appointment with Dr. Desouza, PCP at 12:30pm on Nov. 15th. I have mailed a confirmation to the patient.    Patient expected rehab discharge is Nov. 6.

## 2018-11-09 ENCOUNTER — ALLIED HEALTH/NURSE VISIT (OUTPATIENT)
Dept: CARDIOLOGY | Facility: CLINIC | Age: 53
End: 2018-11-09
Attending: INTERNAL MEDICINE

## 2018-11-09 DIAGNOSIS — I42.2 HYPERTROPHIC NONOBSTRUCTIVE CARDIOMYOPATHY (H): ICD-10-CM

## 2018-11-09 DIAGNOSIS — I50.9 CHRONIC CONGESTIVE HEART FAILURE (H): ICD-10-CM

## 2018-11-09 RX ORDER — CARVEDILOL 6.25 MG/1
9.38 TABLET ORAL 2 TIMES DAILY WITH MEALS
Qty: 270 TABLET | Refills: 3 | Status: SHIPPED | OUTPATIENT
Start: 2018-11-09 | End: 2019-07-03 | Stop reason: DRUGHIGH

## 2018-11-09 RX ORDER — LISINOPRIL 5 MG/1
5 TABLET ORAL 2 TIMES DAILY
Qty: 180 TABLET | Refills: 3 | Status: SHIPPED | OUTPATIENT
Start: 2018-11-09 | End: 2019-05-07 | Stop reason: ALTCHOICE

## 2018-11-09 RX ORDER — SPIRONOLACTONE 25 MG/1
12.5 TABLET ORAL DAILY
Qty: 45 TABLET | Refills: 3 | Status: SHIPPED | OUTPATIENT
Start: 2018-11-09 | End: 2019-04-05

## 2018-11-09 RX ORDER — LISINOPRIL 5 MG/1
TABLET ORAL
Qty: 180 TABLET | Refills: 0 | OUTPATIENT
Start: 2018-11-09

## 2018-11-10 ENCOUNTER — TELEPHONE (OUTPATIENT)
Dept: CARDIOLOGY | Facility: CLINIC | Age: 53
End: 2018-11-10

## 2018-11-10 NOTE — TELEPHONE ENCOUNTER
"Preliminary Device Interrogation Results.  Final physician signed paceart report to be scanned and attached.    [employer], single chamber ICD, remote transmission received and reviewed. Device transmission sent per MD orders. Pt's wife called with pt reports of \"fast heart beats\". His presenting rhythm is  bpm. 8 NSVT episodes recorded since 9/18/18 lasting up to 9 seconds. None correlate with recent symptoms. Normal device function. = 0%. Lead trends appear stable. Battery estimates 12 years to NEETA. Pt notified of transmission results.   Remote ICD transmission      "

## 2018-11-15 DIAGNOSIS — R07.81 RIB PAIN: ICD-10-CM

## 2018-11-15 DIAGNOSIS — I50.23 ACUTE ON CHRONIC SYSTOLIC CONGESTIVE HEART FAILURE (H): ICD-10-CM

## 2018-11-15 RX ORDER — OXYCODONE HYDROCHLORIDE 10 MG/1
10 TABLET ORAL EVERY 6 HOURS PRN
Qty: 30 TABLET | Refills: 0 | Status: CANCELLED | OUTPATIENT
Start: 2018-11-15

## 2018-11-16 DIAGNOSIS — R07.81 RIB PAIN: ICD-10-CM

## 2018-11-16 DIAGNOSIS — I50.23 ACUTE ON CHRONIC SYSTOLIC CONGESTIVE HEART FAILURE (H): ICD-10-CM

## 2018-11-16 RX ORDER — OXYCODONE HYDROCHLORIDE 10 MG/1
10 TABLET ORAL EVERY 6 HOURS PRN
Qty: 30 TABLET | Refills: 0 | Status: SHIPPED | OUTPATIENT
Start: 2018-11-16 | End: 2018-11-28

## 2018-11-19 ENCOUNTER — RADIANT APPOINTMENT (OUTPATIENT)
Dept: ULTRASOUND IMAGING | Facility: CLINIC | Age: 53
End: 2018-11-19
Attending: INTERNAL MEDICINE
Payer: COMMERCIAL

## 2018-11-19 ENCOUNTER — HOSPITAL ENCOUNTER (OUTPATIENT)
Dept: CT IMAGING | Facility: CLINIC | Age: 53
End: 2018-11-19
Attending: INTERNAL MEDICINE | Admitting: INTERNAL MEDICINE
Payer: COMMERCIAL

## 2018-11-19 ENCOUNTER — HOSPITAL ENCOUNTER (OUTPATIENT)
Facility: CLINIC | Age: 53
Discharge: HOME | End: 2018-11-19
Attending: INTERNAL MEDICINE | Admitting: INTERNAL MEDICINE
Payer: COMMERCIAL

## 2018-11-19 ENCOUNTER — APPOINTMENT (OUTPATIENT)
Dept: CARDIOLOGY | Facility: CLINIC | Age: 53
End: 2018-11-19
Attending: INTERNAL MEDICINE
Payer: COMMERCIAL

## 2018-11-19 ENCOUNTER — APPOINTMENT (OUTPATIENT)
Dept: LAB | Facility: CLINIC | Age: 53
End: 2018-11-19
Attending: INTERNAL MEDICINE
Payer: COMMERCIAL

## 2018-11-19 ENCOUNTER — APPOINTMENT (OUTPATIENT)
Dept: MEDSURG UNIT | Facility: CLINIC | Age: 53
End: 2018-11-19
Attending: INTERNAL MEDICINE
Payer: COMMERCIAL

## 2018-11-19 ENCOUNTER — TELEPHONE (OUTPATIENT)
Dept: CARDIOLOGY | Facility: CLINIC | Age: 53
End: 2018-11-19

## 2018-11-19 ENCOUNTER — APPOINTMENT (OUTPATIENT)
Dept: GENERAL RADIOLOGY | Facility: CLINIC | Age: 53
End: 2018-11-19
Attending: INTERNAL MEDICINE
Payer: COMMERCIAL

## 2018-11-19 VITALS
SYSTOLIC BLOOD PRESSURE: 138 MMHG | RESPIRATION RATE: 16 BRPM | WEIGHT: 176 LBS | HEIGHT: 67 IN | OXYGEN SATURATION: 98 % | DIASTOLIC BLOOD PRESSURE: 75 MMHG | BODY MASS INDEX: 27.62 KG/M2 | HEART RATE: 86 BPM | TEMPERATURE: 97.9 F

## 2018-11-19 DIAGNOSIS — I50.22 CHRONIC SYSTOLIC CONGESTIVE HEART FAILURE (H): ICD-10-CM

## 2018-11-19 DIAGNOSIS — R06.09 DOE (DYSPNEA ON EXERTION): ICD-10-CM

## 2018-11-19 DIAGNOSIS — R07.81 RIB PAIN: ICD-10-CM

## 2018-11-19 DIAGNOSIS — I50.23 ACUTE ON CHRONIC SYSTOLIC CONGESTIVE HEART FAILURE (H): ICD-10-CM

## 2018-11-19 DIAGNOSIS — I50.22 CHRONIC SYSTOLIC HEART FAILURE (H): Primary | ICD-10-CM

## 2018-11-19 LAB
6 MIN WALK (FT): 1615 FT
6 MIN WALK (M): 492 M
ABO + RH BLD: NORMAL
ABO + RH BLD: NORMAL
ALBUMIN SERPL-MCNC: 3.7 G/DL (ref 3.4–5)
ALP SERPL-CCNC: 78 U/L (ref 40–150)
ALT SERPL W P-5'-P-CCNC: 25 U/L (ref 0–70)
ANION GAP SERPL CALCULATED.3IONS-SCNC: 4 MMOL/L (ref 3–14)
APTT PPP: 29 SEC (ref 22–37)
AST SERPL W P-5'-P-CCNC: 21 U/L (ref 0–45)
BASOPHILS # BLD AUTO: 0 10E9/L (ref 0–0.2)
BASOPHILS NFR BLD AUTO: 0.2 %
BILIRUB SERPL-MCNC: 0.3 MG/DL (ref 0.2–1.3)
BLD GP AB SCN SERPL QL: NORMAL
BLOOD BANK CMNT PATIENT-IMP: NORMAL
BLOOD BANK CMNT PATIENT-IMP: NORMAL
BUN SERPL-MCNC: 24 MG/DL (ref 7–30)
CALCIUM SERPL-MCNC: 8 MG/DL (ref 8.5–10.1)
CHLORIDE SERPL-SCNC: 103 MMOL/L (ref 94–109)
CHOLEST SERPL-MCNC: 164 MG/DL
CO2 SERPL-SCNC: 26 MMOL/L (ref 20–32)
CREAT SERPL-MCNC: 1.35 MG/DL (ref 0.66–1.25)
CRP SERPL-MCNC: <2.9 MG/L (ref 0–8)
DIFFERENTIAL METHOD BLD: NORMAL
EOSINOPHIL # BLD AUTO: 0.4 10E9/L (ref 0–0.7)
EOSINOPHIL NFR BLD AUTO: 3.6 %
ERYTHROCYTE [DISTWIDTH] IN BLOOD BY AUTOMATED COUNT: 12.9 % (ref 10–15)
FERRITIN SERPL-MCNC: 88 NG/ML (ref 26–388)
GFR SERPL CREATININE-BSD FRML MDRD: 56 ML/MIN/1.7M2
GLUCOSE SERPL-MCNC: 99 MG/DL (ref 70–99)
HCT VFR BLD AUTO: 48 % (ref 40–53)
HDLC SERPL-MCNC: 38 MG/DL
HGB BLD-MCNC: 15.5 G/DL (ref 13.3–17.7)
IMM GRANULOCYTES # BLD: 0 10E9/L (ref 0–0.4)
IMM GRANULOCYTES NFR BLD: 0.3 %
INR PPP: 1.01 (ref 0.86–1.14)
IRON SATN MFR SERPL: 16 % (ref 15–46)
IRON SERPL-MCNC: 51 UG/DL (ref 35–180)
LDH SERPL L TO P-CCNC: 238 U/L (ref 85–227)
LDLC SERPL CALC-MCNC: 70 MG/DL
LYMPHOCYTES # BLD AUTO: 3.6 10E9/L (ref 0.8–5.3)
LYMPHOCYTES NFR BLD AUTO: 32.5 %
MCH RBC QN AUTO: 32 PG (ref 26.5–33)
MCHC RBC AUTO-ENTMCNC: 32.3 G/DL (ref 31.5–36.5)
MCV RBC AUTO: 99 FL (ref 78–100)
MONOCYTES # BLD AUTO: 0.9 10E9/L (ref 0–1.3)
MONOCYTES NFR BLD AUTO: 8.1 %
NEUTROPHILS # BLD AUTO: 6.1 10E9/L (ref 1.6–8.3)
NEUTROPHILS NFR BLD AUTO: 55.3 %
NONHDLC SERPL-MCNC: 126 MG/DL
NRBC # BLD AUTO: 0 10*3/UL
NRBC BLD AUTO-RTO: 0 /100
NT-PROBNP SERPL-MCNC: 3275 PG/ML (ref 0–125)
PHOSPHATE SERPL-MCNC: 3.8 MG/DL (ref 2.5–4.5)
PLATELET # BLD AUTO: 234 10E9/L (ref 150–450)
PLATELET # BLD EST: NORMAL 10*3/UL
POTASSIUM SERPL-SCNC: 4 MMOL/L (ref 3.4–5.3)
PREALB SERPL IA-MCNC: 28 MG/DL (ref 15–45)
PROT SERPL-MCNC: 6.6 G/DL (ref 6.8–8.8)
PSA SERPL-ACNC: 0.28 UG/L (ref 0–4)
RBC # BLD AUTO: 4.84 10E12/L (ref 4.4–5.9)
RBC MORPH BLD: NORMAL
SODIUM SERPL-SCNC: 134 MMOL/L (ref 133–144)
SPECIMEN EXP DATE BLD: NORMAL
TIBC SERPL-MCNC: 323 UG/DL (ref 240–430)
TRANSFERRIN SERPL-MCNC: 253 MG/DL (ref 210–360)
TRIGL SERPL-MCNC: 282 MG/DL
TSH SERPL DL<=0.005 MIU/L-ACNC: 1.29 MU/L (ref 0.4–4)
URATE SERPL-MCNC: 5.9 MG/DL (ref 3.5–7.2)
VIT B12 SERPL-MCNC: 705 PG/ML (ref 193–986)
WBC # BLD AUTO: 11 10E9/L (ref 4–11)

## 2018-11-19 PROCEDURE — 86140 C-REACTIVE PROTEIN: CPT | Performed by: INTERNAL MEDICINE

## 2018-11-19 PROCEDURE — 25000125 ZZHC RX 250: Performed by: INTERNAL MEDICINE

## 2018-11-19 PROCEDURE — 40000166 ZZH STATISTIC PP CARE STAGE 1

## 2018-11-19 PROCEDURE — 27211181 ZZH BALLOON TIP PRESSURE CR5

## 2018-11-19 PROCEDURE — 84100 ASSAY OF PHOSPHORUS: CPT | Performed by: INTERNAL MEDICINE

## 2018-11-19 PROCEDURE — 85730 THROMBOPLASTIN TIME PARTIAL: CPT | Performed by: INTERNAL MEDICINE

## 2018-11-19 PROCEDURE — 84466 ASSAY OF TRANSFERRIN: CPT | Performed by: INTERNAL MEDICINE

## 2018-11-19 PROCEDURE — 82607 VITAMIN B-12: CPT | Performed by: INTERNAL MEDICINE

## 2018-11-19 PROCEDURE — 86850 RBC ANTIBODY SCREEN: CPT | Performed by: INTERNAL MEDICINE

## 2018-11-19 PROCEDURE — 84443 ASSAY THYROID STIM HORMONE: CPT | Performed by: INTERNAL MEDICINE

## 2018-11-19 PROCEDURE — 84550 ASSAY OF BLOOD/URIC ACID: CPT | Performed by: INTERNAL MEDICINE

## 2018-11-19 PROCEDURE — 86901 BLOOD TYPING SEROLOGIC RH(D): CPT | Performed by: INTERNAL MEDICINE

## 2018-11-19 PROCEDURE — 00000401 ZZHCL STATISTIC THROMBIN TIME NC: Performed by: INTERNAL MEDICINE

## 2018-11-19 PROCEDURE — 85613 RUSSELL VIPER VENOM DILUTED: CPT | Performed by: INTERNAL MEDICINE

## 2018-11-19 PROCEDURE — 85025 COMPLETE CBC W/AUTO DIFF WBC: CPT | Performed by: INTERNAL MEDICINE

## 2018-11-19 PROCEDURE — 70450 CT HEAD/BRAIN W/O DYE: CPT

## 2018-11-19 PROCEDURE — 86900 BLOOD TYPING SEROLOGIC ABO: CPT | Performed by: INTERNAL MEDICINE

## 2018-11-19 PROCEDURE — 84134 ASSAY OF PREALBUMIN: CPT | Performed by: INTERNAL MEDICINE

## 2018-11-19 PROCEDURE — 85610 PROTHROMBIN TIME: CPT | Performed by: INTERNAL MEDICINE

## 2018-11-19 PROCEDURE — 82728 ASSAY OF FERRITIN: CPT | Performed by: INTERNAL MEDICINE

## 2018-11-19 PROCEDURE — 27210982 ZZH KIT RT HC TOTES DISP CR7

## 2018-11-19 PROCEDURE — 93451 RIGHT HEART CATH: CPT | Mod: 26 | Performed by: INTERNAL MEDICINE

## 2018-11-19 PROCEDURE — 36415 COLL VENOUS BLD VENIPUNCTURE: CPT | Performed by: INTERNAL MEDICINE

## 2018-11-19 PROCEDURE — 71250 CT THORAX DX C-: CPT

## 2018-11-19 PROCEDURE — G0103 PSA SCREENING: HCPCS | Performed by: INTERNAL MEDICINE

## 2018-11-19 PROCEDURE — 83540 ASSAY OF IRON: CPT | Performed by: INTERNAL MEDICINE

## 2018-11-19 PROCEDURE — 93451 RIGHT HEART CATH: CPT

## 2018-11-19 PROCEDURE — 83550 IRON BINDING TEST: CPT | Performed by: INTERNAL MEDICINE

## 2018-11-19 PROCEDURE — 27210787 ZZH MANIFOLD CR2

## 2018-11-19 PROCEDURE — 83615 LACTATE (LD) (LDH) ENZYME: CPT | Performed by: INTERNAL MEDICINE

## 2018-11-19 PROCEDURE — 80061 LIPID PANEL: CPT | Performed by: INTERNAL MEDICINE

## 2018-11-19 PROCEDURE — 80053 COMPREHEN METABOLIC PANEL: CPT | Performed by: INTERNAL MEDICINE

## 2018-11-19 PROCEDURE — 83880 ASSAY OF NATRIURETIC PEPTIDE: CPT | Performed by: INTERNAL MEDICINE

## 2018-11-19 RX ORDER — LIDOCAINE 40 MG/G
CREAM TOPICAL
Status: COMPLETED | OUTPATIENT
Start: 2018-11-19 | End: 2018-11-19

## 2018-11-19 RX ADMIN — LIDOCAINE: 40 CREAM TOPICAL at 08:21

## 2018-11-19 NOTE — PROCEDURES
CARDIAC CATH REPORT:     PROCEDURES PERFORMED:   Right Heart Catheterization    PHYSICIANS:  Attending Physician: Danie Snyder MD  Cardiology Fellow: Landry Thomas MD    INDICATION:  Akshat Fragoso is a 53 year old male with history significant for non-ischemic cardiomyopathy presenting for elective right heart catheterization.    DESCRIPTION:  1. Consent obtained with discussion of risks.  All questions were answered.  2. Sterile prep and procedure.  3. Location with Sheaths: 7 Fr RIJ short  4. Access: Local anesthetic with lidocaine.  A standard 18 guage needle with ultrasound guidance was used to establish vascular access using a modified Seldinger technique.  5. Diagnostic Catheters:   7 Fr  Revere Jalen  6. Guiding Catheters:  None  7. Estimated blood loss: < 5 ml    MEDICATIONS:  No sedation was given.  Heart rate, BP, respiration, oxygen saturation and patient responses were monitored throughout the procedure with the assistance of the RN under my supervision.    HEMODYNAMICS:  HR 83  /68/87  RA 6/5/6  RV 35/6  PA 35/20/25  PA sat 66%  PCW 12/15/10  Hgb 14.1 g/dL  Carlos CO (CI): 4.42 (2.31)  PVR 3.4  SVR 1466    Sheath Removal:  - The RIJ sheath was removed in the cath lab.    Contrast: Isovue, 0 ml     Fluoroscopy Time: 0.3 min    COMPLICATIONS:  1. None    SUMMARY:   >> normal biventricular filling pressures  >> normal to slightly elevated pulmonary pressure  >> normal to slightly low cardiac output    PLAN:   >> Bedrest per protocol  >> Discharge today    The attending interventional cardiologist was present and supervised all critical aspects the procedure.    Findings discussed with patient and Dr. Aguiar.    See CVIS report for final draft.    Landry Thomas MD   Cardiology Fellow    Stu Snyder MD  Cardiology Staff

## 2018-11-19 NOTE — PROGRESS NOTES
[facility] Cardiology Catheterization Lab  Date: 11/19/2018    Mr. Akshat Fragoso is a 53 year old male with PMH significant for severe, non-ischemic cardiomyopathy (LVEF of 12%) and ICD in situ whom presents for RHC.  Has had increasing orthopnea, dyspnea, weight gain for a period of weeks to months.  Seen recently in clinic with Dr. Diggs whom elected to pursue right heart catheterization for initiation of advanced heart failure therapies.    The procedural details of the right heart catheterization were discussed in detail with the patient.  Risks and benefits were discussed; discussion included possible allergy to contrast dye in addition to:    Benefit: greater hemodynamic understanding of the patient's right heart/pulmonary arteries.  Risks:  - Radiation exposure (X-ray)   (100.0% of patients)  - Bleeding (femoral, retroperitoneal)  (up to 7.0% of patients)  - Transient/Permanent arrhythmia  (up to ~3.0% of patients)  - Dissection (coronaries, great vessels)  (1-3.0% of patients)  - Infection     (<1.0% of patients)  - Emergent open heart surgery  (<0.1% of patients)  - CVA (ischemic)    (~0.1% of patients)  - MI      (~0.1% of patients)  - Death     (<0.1% of patients)    Physical Exam:  General: alert and oriented x4, in no acute distress  CV: regular rate/rhythm. Normal S1 and S2. JVP elevated with hepatojugular reflux  Pulm: CTAB, no appreciable rales, rhonchi  Extremities: no edema  Neuro: equal  strength    Assessment: 53 year old male with systolic heart failure with worsening symptoms x weeks to months. Patient expressed understanding and agrees to move forward with the procedure at this point in time.  All questions were answered.    Plan: proceed with RHC.        Irene Martinez PA-C

## 2018-11-19 NOTE — TELEPHONE ENCOUNTER
Reason for Call:  Other appointment    Detailed comments: Milagro Kingsley advised patient to call to make an appointment with Dr. Aguiar on Friday 11/23/18. There are no open appointments, so they are asking if patient can be worked in. Please advise    Phone Number Patient can be reached at: Home number on file [phone] (home)    Best Time: ASAP    Can we leave a detailed message on this number? YES    Call taken on 11/19/2018 at 5:04 PM by Briseida Lu

## 2018-11-19 NOTE — IP AVS SNAPSHOT
[address] [address]                                       After Visit Summary   11/19/2018    Akshat Fragoso    MRN: [id number]           After Visit Summary Signature Page     I have received my discharge instructions, and my questions have been answered. I have discussed any challenges I see with this plan with the nurse or doctor.    ..........................................................................................................................................  Patient/Patient Representative Signature      ..........................................................................................................................................  Patient Representative Print Name and Relationship to Patient    ..................................................               ................................................  Date                                   Time    ..........................................................................................................................................  Reviewed by Signature/Title    ...................................................              ..............................................  Date                                               Time          22EPIC Rev 08/18

## 2018-11-19 NOTE — IP AVS SNAPSHOT
MRN:[id number]                      After Visit Summary   11/19/2018    Akshat Fragoso    MRN: [id number]           Visit Information        Department      11/19/2018  7:11 AM [address] [facility]          Review of your medicines      UNREVIEWED medicines. Ask your doctor about these medicines        Dose / Directions    albuterol 108 (90 Base) MCG/ACT inhaler   Commonly known as:  PROAIR HFA/PROVENTIL HFA/VENTOLIN HFA   Used for:  JOHNSON (dyspnea on exertion), Rib pain, Acute on chronic systolic congestive heart failure (H)        Dose:  2 puff   Inhale 2 puffs into the lungs every 4 hours as needed for shortness of breath / dyspnea or wheezing   Quantity:  1 Inhaler   Refills:  1       aspirin 81 MG tablet        Dose:  81 mg   Take 81 mg by mouth daily   Refills:  0       carvedilol 6.25 MG tablet   Commonly known as:  COREG   Used for:  Hypertrophic nonobstructive cardiomyopathy (H)        Dose:  9.375 mg   Take 1.5 tablets (9.375 mg) by mouth 2 times daily (with meals)   Quantity:  270 tablet   Refills:  3       furosemide 20 MG tablet   Commonly known as:  LASIX   Used for:  JOHNSON (dyspnea on exertion), Acute on chronic systolic congestive heart failure (H)        Dose:  20 mg   Take 1 tablet (20 mg) by mouth 2 times daily Only take if weight up by 3lbs   Quantity:  90 tablet   Refills:  3       lisinopril 5 MG tablet   Commonly known as:  PRINIVIL/ZESTRIL   Used for:  Hypertrophic nonobstructive cardiomyopathy (H)        Dose:  5 mg   Take 1 tablet (5 mg) by mouth 2 times daily   Quantity:  180 tablet   Refills:  3       oxyCODONE IR 10 MG tablet   Commonly known as:  ROXICODONE   Used for:  Rib pain, Acute on chronic systolic congestive heart failure (H)        Dose:  10 mg   Take 1 tablet (10 mg) by mouth every 6 hours as needed for severe pain   Quantity:  30 tablet   Refills:  0       spironolactone 25 MG tablet   Commonly known as:  ALDACTONE   Used for:  Hypertrophic nonobstructive  cardiomyopathy (H)        Dose:  12.5 mg   Take 0.5 tablets (12.5 mg) by mouth daily   Quantity:  45 tablet   Refills:  3         CONTINUE these medicines which have NOT CHANGED        Dose / Directions    Blood Pressure Monitor Kit   Used for:  Chronic congestive heart failure, unspecified congestive heart failure type        Dose:  1 kit   1 kit daily   Quantity:  1 kit   Refills:  0                Protect others around you: Learn how to safely use, store and throw away your medicines at www.disposemymeds.org.         Follow-ups after your visit        Your next 10 appointments already scheduled     Nov 19, 2018 10:00 AM CST   CT CHEST ABDOMEN PELVIS W/O CONTRAST with UUCT1   [facility], [place], [place] ([facility], [facility])    [address] [facility] [address] [phone]           How do I prepare for my exam? (Food and drink instructions) No Food and Drink Restrictions.  How do I prepare for my exam? (Other instructions) You do not need to do anything special to prepare for this exam. For a sinus scan: Use your nose spray (nasal decongestant spray) as directed.  What should I wear: Please wear loose clothing, such as a sweat suit or jogging clothes. Avoid snaps, zippers and other metal. We may ask you to undress and put on a hospital gown.  How long does the exam take: Most scans take less than 20 minutes.  What should I bring: Please bring any scans or X-rays taken at other hospitals, if similar tests were done. Also bring a list of your medicines, including vitamins, minerals and over-the-counter drugs. It is safest to leave personal items at home.  Do I need a : No  is needed.  What do I need to tell my doctor? Be sure to tell your doctor: * If you have any allergies. * If there s any chance you are pregnant. * If you are breastfeeding.  What should I do after the exam: No restrictions, you may resume normal activities.  What is this test: A CT  (computed tomography) scan is a series of pictures that allows us to look inside your body. The scanner creates images of the body in cross sections, much like slices of bread. This helps us see any problems more clearly.  Who should I call with questions: If you have any questions, please call the Imaging Department where you will have your exam. Directions, parking instructions, and other information are available on our website, [email or web address].            Nov 19, 2018 10:20 AM CST   CT HEAD W/O CONTRAST with UUCT1   [facility], [place], [place] ([facility], [facility])    [address] [facility] [address] [phone]           How do I prepare for my exam? (Food and drink instructions) No Food and Drink Restrictions.  How do I prepare for my exam? (Other instructions) You do not need to do anything special to prepare for this exam. For a sinus scan: Use your nose spray (nasal decongestant spray) as directed.  What should I wear: Please wear loose clothing, such as a sweat suit or jogging clothes. Avoid snaps, zippers and other metal. We may ask you to undress and put on a hospital gown.  How long does the exam take: Most scans take less than 20 minutes.  What should I bring: Please bring any scans or X-rays taken at other hospitals, if similar tests were done. Also bring a list of your medicines, including vitamins, minerals and over-the-counter drugs. It is safest to leave personal items at home.  Do I need a : No  is needed.  What do I need to tell my doctor? Be sure to tell your doctor: * If you have any allergies. * If there s any chance you are pregnant. * If you are breastfeeding.  What should I do after the exam: No restrictions, You may resume normal activities.  What is this test: A CT (computed tomography) scan is a series of pictures that allows us to look inside your body. The scanner creates images of the body in cross sections, much like  slices of bread. This helps us see any problems more clearly.  Who should I call with questions: If you have any questions, please call the Imaging Department where you will have your exam. Directions, parking instructions, and other information is available on our website, [email or web address].            Nov 19, 2018 12:40 PM CST   ecg with  CV EKG   [facility] ([facility])    [address] [place] [id number] [phone]            Nov 19, 2018  1:00 PM CST   US LOWER EXTREMITY ARTERIAL DUPLEX BILATERAL with UCUSV1   [facility] US ([facility])    [address] [address] [phone]           How do I prepare for my exam? (Food and drink instructions) No Food and Drink Restrictions.  How do I prepare for my exam? (Other instructions) You do not need to do anything special to prepare for your exam.  What should I wear: Wear comfortable clothes.  How long does the exam take: Most ultrasounds take 30 to 60 minutes.  What should I bring: Bring a list of your medicines, including vitamins, minerals and over-the-counter drugs. It is safest to leave personal items at home.  Do I need a :  No  is needed.  What do I need to tell my doctor: Tell your doctor about any allergies you may have.  What should I do after the exam: No restrictions, You may resume normal activities.  What is this test: An ultrasound uses sound waves to make pictures of the body. Sound waves do not cause pain. The only discomfort may be the pressure of the wand against your skin or full bladder.  Who should I call with questions: If you have any questions, please call the Imaging Department where you will have your exam. Directions, parking instructions, and other information is available on our website, [email or web address].            Nov 19, 2018  2:00 PM CST   US MISAEL DOPPLER NO EXERCISE  1-2 LVLS BILAT with UCUSV1   [facility] US ([facility])    [address] [place] [id number] [phone]           How do I prepare for my exam? (Food and drink instructions) No caffeine or tobacco for 1 hour prior to exam.  What should I wear: Wear comfortable clothes.  How long does the exam take: Most ultrasounds take 30 to 60 minutes.  What should I bring: Bring a list of your medicines, including vitamins, minerals and over-the-counter drugs. It is safest to leave personal items at home.  Do I need a :  No  is needed.  What do I need to tell my doctor: Tell your doctor about any allergies you may have.  What should I do after the exam: No restrictions, You may resume normal activities.  What is this test: An ultrasound uses sound waves to make pictures of the body. Sound waves do not cause pain. The only discomfort may be the pressure of the wand against your skin or full bladder.  Who should I call with questions: If you have any questions, please call the Imaging Department where you will have your exam. Directions, parking instructions, and other information is available on our website, [email or web address].            Nov 19, 2018  2:30 PM CST   US CAROTID BILATERAL with USV1   [facility] ([facility])    [address] [place] [id number] [phone]           How do I prepare for my exam? (Food and drink instructions) No Food and Drink Restrictions.  How do I prepare for my exam? (Other instructions) You do not need to do anything special to prepare for your exam.  What should I wear: Wear comfortable clothes.  How long does the exam take: Most ultrasounds take 30 to 60 minutes.  What should I bring: Bring a list of your medicines, including vitamins, minerals and over-the-counter drugs. It is safest to leave personal items at home.  Do I need a :  No   is needed.  What do I need to tell my doctor: Tell your doctor about any allergies you may have.  What should I do after the exam: No restrictions, You may resume normal activities.  What is this test: An ultrasound uses sound waves to make pictures of the body. Sound waves do not cause pain. The only discomfort may be the pressure of the wand against your skin or full bladder.  Who should I call with questions: If you have any questions, please call the Imaging Department where you will have your exam. Directions, parking instructions, and other information is available on our website, [email or web address].            Nov 19, 2018  3:30 PM CST   FULL PULMONARY FUNCTION with UC PFL D   [facility] ([facility])    [address] [place] [id number]   [phone]            Nov 19, 2018  4:30 PM CST   Six Minute Walk with UC PFL 6 MINUTE WALK 1   [facility] ([facility])    [address] [place] [phone]   [phone]            Nov 20, 2018  8:30 AM CST   US ABDOMEN COMPLETE with UCUS1   [facility] US ([facility])    [address] [place] [id number] [phone]           How do I prepare for my exam? (Food and drink instructions) Adults: No eating, smoking, gum chewing or drinking for 8 hours before the exam. You may take medicine with a small sip of water.  Children: * Infants, breast-fed: may have breast milk up to 2 hours before exam. * Infants, formula: may have bottle until 4 hours before exam. * Children 1-5 years: No food or drink for 4 hours before exam. * Children 6 -12 years: No food or drink for 6 hours before exam. * Children over 12 years: No food or drink for 8 hours before exam.  * J Tube Fed: No need to stop feedings.  What should I wear: Wear comfortable clothes.  How long does  the exam take: Most ultrasounds take 30 to 60 minutes.  What should I bring: Bring a list of your medicines, including vitamins, minerals and over-the-counter drugs. It is safest to leave personal items at home.  Do I need a :  No  is needed.  What do I need to tell my doctor: Tell your doctor about any allergies you may have.  What should I do after the exam: No restrictions, You may resume normal activities.  What is this test: An ultrasound uses sound waves to make pictures of the body. Sound waves do not cause pain. The only discomfort may be the pressure of the wand against your skin or full bladder.  Who should I call with questions: If you have any questions, please call the Imaging Department where you will have your exam. Directions, parking instructions, and other information is available on our website, [email or web address].            Nov 20, 2018  4:00 PM CST   (Arrive by 3:45 PM)   New Patient Visit with Dakota Waller MD   [facility] ([facility])    [address] [place] [address] [phone]               Care Instructions        Further instructions from your care team       [facility]                        Interventional Cardiology  Discharge Instructions   Post Right Heart Cath      AFTER YOU GO HOME:    DO drink plenty of fluids    DO resume your regular diet and medications unless otherwise instructed by your Primary Physician    Do Not scrub the procedure site vigorously    No lotion or powder to the puncture site for 3 days    CALL YOUR PRIMARY PHYSICIAN IF: You may resume all normal activity.  Monitor neck site for bleeding, swelling, or voice changes. If you notice bleeding or swelling immediately apply pressure to the site and call number below to speak with Cardiology Fellow.  If you experience any changes in your breathing you should call your doctor immediately or come to the closest  "Emergency Department.  Do not drive yourself.    ADDITIONAL INSTRUCTIONS: Medications: You are to resume all home medications including anticoagulation therapy unless otherwise advised by your primary cardiologist or nurse coordinator.    Follow Up: Per your primary cardiology team    If you have any questions or concerns regarding your procedure site please call [phone] at anytime and ask for Cardiology Fellow on call.  They are available 24 hours a day.  You may also contact the Cardiology Clinic after hours number at [phone].                                                       Telephone Numbers [phone] Monday-Friday 8:00 am to 4:30 pm    [phone] After 4:30 pm Monday-Friday, Weekends & Holidays  Ask for Interventional Cardiologist on call. Someone is on call 24 hours/day   [facility] toll free number [phone] Monday-Friday 8:00 am to 4:30 pm   [facility] Emergency Dept [phone]                    Additional Information About Your Visit        [employer] Information     [employer] gives you secure access to your electronic health record. If you see a primary care provider, you can also send messages to your care team and make appointments. If you have questions, please call your primary care clinic.  If you do not have a primary care provider, please call [phone] and they will assist you.        Care EveryWhere ID     This is your Care EveryWhere ID. This could be used by other organizations to access your [facility] medical records  [id number]        Your Vitals Were     Blood Pressure Pulse Temperature Respirations Height Weight    138/75 (BP Location: Right arm) 86 97.9  F (36.6  C) (Oral) 16 1.702 m (5' 7\") 79.8 kg (176 lb)    Pulse Oximetry BMI (Body Mass Index)                98% 27.57 kg/m2           Primary Care Provider Office Phone # Fax #    VERONICA Hallman [facility] [phone]      Equal Access to Services     MAXIMINO TALAMANTES AH: Kristopher reddy " Sojay, wadevenda luqadaha, qaybta kaalmada loli, kieran stylesbri sumanth. So [facility] [phone].    ATENCIÓN: Si amina norman, tiene a whipple disposición servicios gratuitos de asistencia lingüística. Lacho al [phone].    We comply with applicable federal civil rights laws and [place] laws. We do not discriminate on the basis of race, color, national origin, age, disability, sex, sexual orientation, or gender identity.            Thank you!     Thank you for choosing [facility] for your care. Our goal is always to provide you with excellent care. Hearing back from our patients is one way we can continue to improve our services. Please take a few minutes to complete the written survey that you may receive in the mail after you visit with us. Thank you!             Medication List: This is a list of all your medications and when to take them. Check marks below indicate your daily home schedule. Keep this list as a reference.      Medications           Morning Afternoon Evening Bedtime As Needed    albuterol 108 (90 Base) MCG/ACT inhaler   Commonly known as:  PROAIR HFA/PROVENTIL HFA/VENTOLIN HFA   Inhale 2 puffs into the lungs every 4 hours as needed for shortness of breath / dyspnea or wheezing                                aspirin 81 MG tablet   Take 81 mg by mouth daily                                Blood Pressure Monitor Kit   1 kit daily                                carvedilol 6.25 MG tablet   Commonly known as:  COREG   Take 1.5 tablets (9.375 mg) by mouth 2 times daily (with meals)                                furosemide 20 MG tablet   Commonly known as:  LASIX   Take 1 tablet (20 mg) by mouth 2 times daily Only take if weight up by 3lbs                                lisinopril 5 MG tablet   Commonly known as:  PRINIVIL/ZESTRIL   Take 1 tablet (5 mg) by mouth 2 times daily                                oxyCODONE IR 10 MG tablet   Commonly known as:  ROXICODONE   Take 1 tablet (10  mg) by mouth every 6 hours as needed for severe pain                                spironolactone 25 MG tablet   Commonly known as:  ALDACTONE   Take 0.5 tablets (12.5 mg) by mouth daily

## 2018-11-19 NOTE — PROGRESS NOTES
Returned from [facility] s/p [facility].  Right neck site CDI.  Reviewed discharge instructions with patient.  Discharged to [facility] waiting room for further appointments today.

## 2018-11-19 NOTE — DISCHARGE INSTRUCTIONS
[facility]                        Interventional Cardiology  Discharge Instructions   Post Right Heart Cath      AFTER YOU GO HOME:    DO drink plenty of fluids    DO resume your regular diet and medications unless otherwise instructed by your Primary Physician    Do Not scrub the procedure site vigorously    No lotion or powder to the puncture site for 3 days    CALL YOUR PRIMARY PHYSICIAN IF: You may resume all normal activity.  Monitor neck site for bleeding, swelling, or voice changes. If you notice bleeding or swelling immediately apply pressure to the site and call number below to speak with Cardiology Fellow.  If you experience any changes in your breathing you should call your doctor immediately or come to the closest Emergency Department.  Do not drive yourself.    ADDITIONAL INSTRUCTIONS: Medications: You are to resume all home medications including anticoagulation therapy unless otherwise advised by your primary cardiologist or nurse coordinator.    Follow Up: Per your primary cardiology team    If you have any questions or concerns regarding your procedure site please call [phone] at anytime and ask for Cardiology Fellow on call.  They are available 24 hours a day.  You may also contact the Cardiology Clinic after hours number at [phone].                                                       Telephone Numbers [phone] Monday-Friday 8:00 am to 4:30 pm    [phone] After 4:30 pm Monday-Friday, Weekends & Holidays  Ask for Interventional Cardiologist on call. Someone is on call 24 hours/day   [facility] toll free number [phone] Monday-Friday 8:00 am to 4:30 pm   [facility] Emergency Dept [phone]

## 2018-11-20 ENCOUNTER — RADIANT APPOINTMENT (OUTPATIENT)
Dept: ULTRASOUND IMAGING | Facility: CLINIC | Age: 53
End: 2018-11-20
Attending: INTERNAL MEDICINE
Payer: COMMERCIAL

## 2018-11-20 ENCOUNTER — ALLIED HEALTH/NURSE VISIT (OUTPATIENT)
Dept: CARDIOLOGY | Facility: CLINIC | Age: 53
End: 2018-11-20
Attending: INTERNAL MEDICINE
Payer: COMMERCIAL

## 2018-11-20 DIAGNOSIS — I50.22 CHRONIC SYSTOLIC CONGESTIVE HEART FAILURE (H): Primary | ICD-10-CM

## 2018-11-20 DIAGNOSIS — I50.22 CHRONIC SYSTOLIC CONGESTIVE HEART FAILURE (H): ICD-10-CM

## 2018-11-20 LAB
DLCOCOR-%PRED-PRE: 61 %
DLCOCOR-PRE: 17.98 ML/MIN/MMHG
DLCOUNC-%PRED-PRE: 63 %
DLCOUNC-PRE: 18.42 ML/MIN/MMHG
DLCOUNC-PRED: 29.1 ML/MIN/MMHG
ERV-%PRED-PRE: 39 %
ERV-PRE: 0.44 L
ERV-PRED: 1.11 L
EXPTIME-PRE: 10.45 SEC
FEF2575-%PRED-PRE: 18 %
FEF2575-PRE: 0.56 L/SEC
FEF2575-PRED: 3.07 L/SEC
FEFMAX-%PRED-PRE: 41 %
FEFMAX-PRE: 3.8 L/SEC
FEFMAX-PRED: 9.09 L/SEC
FEV1-%PRED-PRE: 41 %
FEV1-PRE: 1.38 L
FEV1FEV6-PRE: 52 %
FEV1FEV6-PRED: 80 %
FEV1FVC-PRE: 48 %
FEV1FVC-PRED: 77 %
FEV1SVC-PRE: 44 %
FEV1SVC-PRED: 70 %
FIFMAX-PRE: 4.81 L/SEC
FRCPLETH-%PRED-PRE: 162 %
FRCPLETH-PRE: 5.46 L
FRCPLETH-PRED: 3.36 L
FVC-%PRED-PRE: 69 %
FVC-PRE: 2.86 L
FVC-PRED: 4.09 L
IC-%PRED-PRE: 74 %
IC-PRE: 2.65 L
IC-PRED: 3.55 L
INTERPRETATION ECG - MUSE: NORMAL
LA PPP-IMP: NEGATIVE
RVPLETH-%PRED-PRE: 235 %
RVPLETH-PRE: 5.02 L
RVPLETH-PRED: 2.13 L
TLCPLETH-%PRED-PRE: 124 %
TLCPLETH-PRE: 8.11 L
TLCPLETH-PRED: 6.52 L
VA-%PRED-PRE: 86 %
VA-PRE: 5.41 L
VC-%PRED-PRE: 66 %
VC-PRE: 3.09 L
VC-PRED: 4.66 L

## 2018-11-20 NOTE — MR AVS SNAPSHOT
After Visit Summary   11/20/2018    Akshat Fragoso    MRN: [id number]           Patient Information     Date Of Birth          1965        Visit Information        Provider Department      11/20/2018 7:00 AM Nurse, Robert [facility]        Today's Diagnoses     Chronic systolic congestive heart failure (H)    -  1       Follow-ups after your visit        Your next 10 appointments already scheduled     Nov 21, 2018  2:00 PM CST   XR CHEST 2 VIEWS with UUXR3   [facility])    [address] [facility] [facility] [address]   [phone]           How do I prepare for my exam? (Food and drink instructions) No Food and Drink Restrictions.  How do I prepare for my exam? (Other instructions) You do not need to do anything special for this exam.  What should I wear: Wear comfortable clothes.  How long does the exam take: Most scans take less than 5 minutes.  What should I bring: Bring a list of your medicines, including vitamins, minerals and over-the-counter drugs. It is safest to leave personal items at home.  Do I need a :  No  is needed.  What do I need to tell my doctor: Tell your doctor if there s any chance you are pregnant.  What should I do after the exam: No restrictions, You may resume normal activities.  What is this test: An image of a specific body part shown in shades of black and white.  Who should I call with questions: If you have any questions, please call the Imaging Department where you will have your exam. Directions, parking instructions, and other information is available on our website, [email or web address].            Nov 21, 2018  2:30 PM CST   Ech Complete with UUECHR2   [facility]  [facility] ([facility])    [address] [facility] [address]   [phone]           1.  Please bring or wear a comfortable two-piece  outfit. 2.  You may eat, drink and take your normal medicines. 3.  For any questions that cannot be answered, please contact the ordering physician 4.  Please do not wear perfumes or scented lotions on the day of your exam.            Nov 23, 2018  7:00 AM CST   Lab with [facility] LAB    Health Lab ([facility])    [address]  1st Floor  [place] [address]   [phone]            Nov 23, 2018  7:30 AM CST   (Arrive by 7:15 AM)   CORE NEW with Saba Hickman NP   [facility] ([facility])    [address]  [address]  [place] [address] [phone]            Nov 23, 2018  9:00 AM CST   NUTRITION VISIT with Kya Robbins RD   [facility] Solid Organ Transplant ([facility])    [address]  [address]  [place] [address]   [phone]            Nov 26, 2018 10:30 AM CST   Card Cardpul Stress Tst Adult with UUEKGS   [facility] ELECTROCARDIOLOGY ([facility])    [address] [address]               Nov 30, 2018  1:00 PM CST   Return Visit with Shaun Aguiar MD   [facility] PHYSICIANS HEART AT [facility] ([facility])    [address] [place] [address] [address]nd [facility] [address]   [phone]            Dec 04, 2018  2:00 PM CST   (Arrive by 1:45 PM)   SOT SOCIAL [facility] EVAL [facility]    [address]  [address]  [place] [id number] [phone]            Dec 04, 2018  3:00 PM CST   (Arrive by 2:45 PM)   New Patient Visit with Leyda Quintero MD   [facility] ([facility])    [address]  [address]  [place] [id number] [phone]            Dec 04, 2018  4:30 PM CST   (Arrive by 4:15 PM)   New Patient Visit with Dakota Waller MD   [facility] ([facility])    [address]  [address]  [facility] [place] [facility] [address] [phone]              Future tests that were ordered for you today     Open Future Orders        Priority Expected Expires Ordered    Basic metabolic panel Routine 11/23/2018 11/19/2019 11/19/2018    N terminal pro BNP outpatient Routine 11/23/2018 11/19/2019 11/19/2018            Who to contact     If you have questions or need follow up information about today's clinic visit or your schedule please contact [facility] directly at [phone].  Normal or non-critical lab and imaging results will be communicated to you by [employer], letter or phone within 4 business days after the clinic has received the results. If you do not hear from us within 7 days, please contact the clinic through [email or web address] or phone. If you have a critical or abnormal lab result, we will notify you by phone as soon as possible.  Submit refill requests through [employer] or call your pharmacy and they will forward the refill request to us. Please allow 3 business days for your refill to be completed.          Additional Information About Your Visit        MyChart Information     [employer] gives you secure access to your electronic health record. If you see a primary care provider, you can also send messages to your care team and make appointments. If you have questions, please call your primary care clinic.  If you do not have a primary care provider, please call [phone] and they will assist you.        Care EveryWhere ID     This is your Care EveryWhere ID. This could be used by other organizations to access your [facility] medical records  [id number]         Blood Pressure from Last 3 Encounters:   11/19/18 138/75   10/26/18 94/64   06/26/18 118/77    Weight from Last 3 Encounters:   11/19/18 79.8 kg (176 lb)   10/26/18 79.8 kg (176 lb)   06/26/18 76.9 kg (169 lb 8 oz)              Today, you had the following     No orders  found for display       Primary Care Provider Office Phone # Fax #    VERONICA Hallman [phone] [address] [place] [address]        Equal Access to Services     MAXIMINO TALAMANTES : Hadii aad ku hadrituo Soomaali, waaxda luqadaha, qaybta kaalmada adeegyada, waxniharika juanain diannan ade wyatt daniel julio. So [facility] [phone].    ATENCIÓN: Si habla español, tiene a whipple disposición servicios gratuitos de asistencia lingüística. Llame al [phone].    We comply with applicable federal civil rights laws and [place] laws. We do not discriminate on the basis of race, color, national origin, age, disability, sex, sexual orientation, or gender identity.            Thank you!     Thank you for choosing [facility]  for your care. Our goal is always to provide you with excellent care. Hearing back from our patients is one way we can continue to improve our services. Please take a few minutes to complete the written survey that you may receive in the mail after your visit with us. Thank you!             Your Updated Medication List - Protect others around you: Learn how to safely use, store and throw away your medicines at www.disposemymeds.org.          This list is accurate as of 11/20/18  6:28 PM.  Always use your most recent med list.                   Brand Name Dispense Instructions for use Diagnosis    albuterol 108 (90 Base) MCG/ACT inhaler    PROAIR HFA/PROVENTIL HFA/VENTOLIN HFA    1 Inhaler    Inhale 2 puffs into the lungs every 4 hours as needed for shortness of breath / dyspnea or wheezing    JOHNSON (dyspnea on exertion), Rib pain, Acute on chronic systolic congestive heart failure (H)       aspirin 81 MG tablet      Take 81 mg by mouth daily        Blood Pressure Monitor Kit     1 kit    1 kit daily    Chronic congestive heart failure, unspecified congestive heart failure type       carvedilol 6.25 MG tablet    COREG    270 tablet    Take 1.5 tablets (9.375 mg)  by mouth 2 times daily (with meals)    Hypertrophic nonobstructive cardiomyopathy (H)       furosemide 20 MG tablet    LASIX    90 tablet    Take 1 tablet (20 mg) by mouth 2 times daily Only take if weight up by 3lbs    JOHNSON (dyspnea on exertion), Acute on chronic systolic congestive heart failure (H)       lisinopril 5 MG tablet    PRINIVIL/ZESTRIL    180 tablet    Take 1 tablet (5 mg) by mouth 2 times daily    Hypertrophic nonobstructive cardiomyopathy (H)       oxyCODONE IR 10 MG tablet    ROXICODONE    30 tablet    Take 1 tablet (10 mg) by mouth every 6 hours as needed for severe pain    Rib pain, Acute on chronic systolic congestive heart failure (H)       spironolactone 25 MG tablet    ALDACTONE    45 tablet    Take 0.5 tablets (12.5 mg) by mouth daily    Hypertrophic nonobstructive cardiomyopathy (H)

## 2018-11-20 NOTE — TELEPHONE ENCOUNTER
Dr. Aguiar not in clinic on 11/23.  Patient is added onto Dr. Aguiar's schedule 11/30 1:15 pm.  Writer explained that Dr. Aguiar will be informed to discuss if this appointment is appropriate.  Called ended and writer may have told him the wrong date (11/23)-need to confirm 11/30.    Patient called back and 11/30 is confirmed.    Leslie Larios RN

## 2018-11-20 NOTE — NURSING NOTE
"Met with patient, wifeto present the HM3, HW VAD(s) as possible treatment option.     Discussed patient and caregiver responsibilities before and after VAD implant.  Clarified the need for a caregiver to be present for education and to assist patient for at least first 30 days after a patient returns home. Patient's designated caregiver is wifeLandry.     Discussed basic overview of VAD equipment, on going management, need for anticoagulation, regular dressing change, grounded three-pronged outlet and functioning telephone. Also discussed frequency of follow-up clinic appointments and the need to stay locally for at least 2-4 weeks.  Reviewed restrictions of having a VAD such as no swimming, bathing, boats, MRI's, or arc welding.     Provided and discussed the VAD educational brochures, information regarding the VAD and transplant support groups, information on INTERMACs registry, and \"VAD What You Should Know\" with additional details. Patient and wife signed \"VAD What You Should Know\" document. VAD coordinator contact information provided.  Encouraged patient, wife to call with questions.     "

## 2018-11-21 RX ORDER — ALBUTEROL SULFATE 90 UG/1
2 AEROSOL, METERED RESPIRATORY (INHALATION) EVERY 4 HOURS PRN
Qty: 1 INHALER | Refills: 1 | Status: SHIPPED | OUTPATIENT
Start: 2018-11-21 | End: 2018-12-14

## 2018-11-26 ENCOUNTER — TELEPHONE (OUTPATIENT)
Dept: CARDIOLOGY | Facility: CLINIC | Age: 53
End: 2018-11-26

## 2018-11-26 NOTE — TELEPHONE ENCOUNTER
"Patient called requesting a refill for his oxycodone.  States he has 2 tablets left.  Was last refilled by Dr. Aguiar on 11/16/18.  Reports his dad recently passed away and he has been on his feet more.  C/o continued leg and rib pain.  States tylenol and ibuprofen does not work.  Pt is requesting prescription to be signed by tomorrow since he has to fly to [place] with his dads remains.    Writer contacted Dr. Aguiar and per Dr. Aguiar, patient is to see his primary care for future refills.  This was communicated with the patient whom verbalized understanding.  Writer offered assistance with an appointment with family practice but patient declined and states \"I will schedule the appointment with my primary care doctor\".      Leslie Larios RN  Care Coordinator  [facility]  [phone]      "

## 2018-11-28 ENCOUNTER — OFFICE VISIT (OUTPATIENT)
Dept: FAMILY MEDICINE | Facility: CLINIC | Age: 53
End: 2018-11-28
Payer: COMMERCIAL

## 2018-11-28 VITALS
HEART RATE: 50 BPM | DIASTOLIC BLOOD PRESSURE: 64 MMHG | RESPIRATION RATE: 20 BRPM | BODY MASS INDEX: 26.84 KG/M2 | SYSTOLIC BLOOD PRESSURE: 108 MMHG | TEMPERATURE: 97.9 F | HEIGHT: 67 IN | OXYGEN SATURATION: 96 % | WEIGHT: 171 LBS

## 2018-11-28 DIAGNOSIS — R07.81 RIB PAIN: ICD-10-CM

## 2018-11-28 DIAGNOSIS — I50.23 ACUTE ON CHRONIC SYSTOLIC CONGESTIVE HEART FAILURE (H): ICD-10-CM

## 2018-11-28 PROCEDURE — 99214 OFFICE O/P EST MOD 30 MIN: CPT | Performed by: NURSE PRACTITIONER

## 2018-11-28 RX ORDER — OXYCODONE HYDROCHLORIDE 10 MG/1
10 TABLET ORAL EVERY 8 HOURS PRN
Qty: 30 TABLET | Refills: 0 | Status: SHIPPED | OUTPATIENT
Start: 2018-11-28 | End: 2018-12-06

## 2018-11-28 NOTE — PROGRESS NOTES
"  SUBJECTIVE:   Akshat Fragoso is a 53 year old male who presents to clinic today for the following health issues:  Chief Complaint   Patient presents with     Pain       Cough:  \"constantly. All day long, all night long.\"  Related to fluid overload.  Cardiology increased his lasix.  He is urinating more, feeling like the lasix is working.  His comprehensive cardiac work up continues.  Valve:  \"it looks like a garbage disposal part that is in my heart linked up with wires that come outside my body and are hooked to a battery pack.'  \"the valve will buy me time until I get a heart.\"  He is not on the transplant list at this time, but cardiology has prepped him that he eventually will be.    Heart:  Planning valve replacement in December.  After his valve replacement he will be hospitalized \"for a month to a month and a half and then rehab.\"  Rx from cardiology 10/2018 and 11/16/2018.  Taking oxycodone rapid release 10mg 3 times a day.    Upcoming dental work/having teeth pulled prior to transplant.    Financial difficulties:  \"I've lost my job and my house because of my heart.\"  Home in [place] [facility] [place].  He is currently in the process of moving out/into a duplex.  Anticipated final moving date 12/3/03992.    PAIN MANAGEMENT:  Akshat is clinic today requesting refills on his oxycodone.    He has a complex history of taking narcotics, but he states this time things are different.  He is  having problems with rib pain related to his constant coughing.  His cardiologist has given him 2 different refills of his oxycodone but did refer him back to primary care for additional management.  He states he is using 2-3 oxycodone tablets per day.  He needs the most when he gets out of bed and when he goes to bed.  He anticipates his valve replacement surgery very quickly, December, and he is thinking he will need oxycodone to get him through to that surgery.      Problem list and histories reviewed & adjusted, as indicated.  Additional " history: as documented    Patient Active Problem List   Diagnosis     CHF (congestive heart failure) (H)     Cardiomyopathy, nonischemic (H)     Pulmonary nodules, next CT due 1/2018     Personal history of tobacco use, presenting hazards to health     ICD (implantable cardioverter-defibrillator) in place- [employer], single chamber- NOT dependent     Chronic systolic heart failure (H)     Past Surgical History:   Procedure Laterality Date     CARDIAC SURGERY  2016    defibrillator placement     None         Social History   Substance Use Topics     Smoking status: Former Smoker     Packs/day: 0.50     Years: 15.00     Types: Cigarettes     Quit date: 12/1/2016     Smokeless tobacco: Former User     Quit date: 10/24/2011     Alcohol use No     History reviewed. No pertinent family history.      Current Outpatient Prescriptions   Medication Sig Dispense Refill     albuterol (PROAIR HFA/PROVENTIL HFA/VENTOLIN HFA) 108 (90 Base) MCG/ACT inhaler Inhale 2 puffs into the lungs every 4 hours as needed for shortness of breath / dyspnea or wheezing 1 Inhaler 1     aspirin 81 MG tablet Take 81 mg by mouth daily       Blood Pressure Monitor KIT 1 kit daily 1 kit 0     carvedilol (COREG) 6.25 MG tablet Take 1.5 tablets (9.375 mg) by mouth 2 times daily (with meals) 270 tablet 3     furosemide (LASIX) 20 MG tablet Take 1 tablet (20 mg) by mouth 2 times daily Only take if weight up by 3lbs 90 tablet 3     lisinopril (PRINIVIL/ZESTRIL) 5 MG tablet Take 1 tablet (5 mg) by mouth 2 times daily 180 tablet 3     oxyCODONE IR (ROXICODONE) 10 MG tablet Take 1 tablet (10 mg) by mouth every 8 hours as needed for severe pain 30 tablet 0     spironolactone (ALDACTONE) 25 MG tablet Take 0.5 tablets (12.5 mg) by mouth daily 45 tablet 3     Allergies   Allergen Reactions     No Known Allergies      Recent Labs   Lab Test  11/19/18   0746  06/08/17   0739   01/10/17   0718   01/09/17   1001   LDL  70   --    --   64   --    --    HDL  38*  "  --    --   39*   --    --    TRIG  282*   --    --   103   --    --    ALT  25   --    --    --    --   111*   CR  1.35*  0.90   < >  0.90   < >  0.85   GFRESTIMATED  56*  90   < >  90   < >  >90  Non  GFR Calc     GFRESTBLACK  67  >90   GFR Calc     < >  >90   GFR Calc     < >  >90   GFR Calc     POTASSIUM  4.0  4.2   < >  4.4   --   4.9   TSH  1.29   --    --    --    --   1.34    < > = values in this interval not displayed.      BP Readings from Last 3 Encounters:   11/28/18 108/64   11/19/18 138/75   10/26/18 94/64    Wt Readings from Last 3 Encounters:   11/28/18 171 lb (77.6 kg)   11/19/18 176 lb (79.8 kg)   10/26/18 176 lb (79.8 kg)                  Labs reviewed in EPIC    Reviewed and updated as needed this visit by clinical staff  Tobacco  Allergies  Meds  Med Hx  Surg Hx  Fam Hx  Soc Hx      Reviewed and updated as needed this visit by Provider         ROS:  Constitutional, HEENT, cardiovascular, pulmonary, GI, , musculoskeletal, neuro, skin, endocrine and psych systems are negative, except as otherwise noted.    OBJECTIVE:     /64  Pulse 50  Temp 97.9  F (36.6  C) (Oral)  Resp 20  Ht 5' 7\" (1.702 m)  Wt 171 lb (77.6 kg)  SpO2 96%  BMI 26.78 kg/m2  Body mass index is 26.78 kg/(m^2).  GENERAL APPEARANCE: healthy, alert and no distress. Smiling.   SKIN: warm and dry  PSYCH: mentation appears normal and affect normal/bright.  Good eye contact.      ASSESSMENT/PLAN:     (R07.81) Rib pain  Comment: Acute  Plan: oxyCODONE IR (ROXICODONE) 10 MG tablet            (I50.23) Acute on chronic systolic congestive heart failure (H)  Comment:   Plan: oxyCODONE IR (ROXICODONE) 10 MG tablet          I had a long discussion with the patient today about his request for oxycodone, his previous history of overusing narcotics, and the necessity to use the oxycodone only for acute pain.  I did tell him that I am not denying that he has pain " at all, but I do not want him to become addicted to oxycodone.  He agrees.  He states he feels he will only need this for short time, until his surgery.  I did tell him that if and when his surgery happens, but additional oxycodone will be managed by surgery initially especially per surgery pain.  He agrees and understands.  For now, he agrees to take 1-3 tablets/day.  He will take less if possible.      I anticipate that the current prescription will last him until 12/7/2018.  In the event that he needs another refill, I will least asked him to either do a my chart E visit or a face-to-face appointment to discuss further plans, get clarification with upcoming surgeries etc.  He agrees and understands.  The [place] prescription monitoring program website database was accessed and the patient's history, stated use of narcotic does correlate with the database.    I spent a total of 30 min with the patient, >50% time spent face to face counseling regarding the above issues, establishing a plan of care, and coordinating follow up care.     VERONICA Doran [facility]

## 2018-11-28 NOTE — MR AVS SNAPSHOT
After Visit Summary   11/28/2018    Akshat Fragoso    MRN: [id number]           Patient Information     Date Of Birth          1965        Visit Information        Provider Department      11/28/2018 11:40 AM Denise White APRN [facility]        Today's Diagnoses     Rib pain        Acute on chronic systolic congestive heart failure (H)           Follow-ups after your visit        Your next 10 appointments already scheduled     Nov 30, 2018  9:00 AM CST   XR CHEST 2 VIEWS with UUXR3   [facility],  Radiology ([facility])    [address] [address]   [phone]           How do I prepare for my exam? (Food and drink instructions) No Food and Drink Restrictions.  How do I prepare for my exam? (Other instructions) You do not need to do anything special for this exam.  What should I wear: Wear comfortable clothes.  How long does the exam take: Most scans take less than 5 minutes.  What should I bring: Bring a list of your medicines, including vitamins, minerals and over-the-counter drugs. It is safest to leave personal items at home.  Do I need a :  No  is needed.  What do I need to tell my doctor: Tell your doctor if there s any chance you are pregnant.  What should I do after the exam: No restrictions, You may resume normal activities.  What is this test: An image of a specific body part shown in shades of black and white.  Who should I call with questions: If you have any questions, please call the Imaging Department where you will have your exam. Directions, parking instructions, and other information is available on our website, [email or web address].            Dec 04, 2018  9:30 AM CST   Ech Complete with UUECHR2   [facility],  Echocardiography ([facility])    [address] [address] [phone]            1.  Please bring or wear a comfortable two-piece outfit. 2.  You may eat, drink and take your normal medicines. 3.  For any questions that cannot be answered, please contact the ordering physician 4.  Please do not wear perfumes or scented lotions on the day of your exam.            Dec 04, 2018 10:30 AM CST   Card Cardpul Stress Tst Adult with UUEKGS   [facility] ([facility])    [address] [address]               Dec 04, 2018 12:45 PM CST   XR CHEST 2 VIEWS with UUXR3   [facility] [place],  Radiology ([facility])    [address] [address] [phone]           How do I prepare for my exam? (Food and drink instructions) No Food and Drink Restrictions.  How do I prepare for my exam? (Other instructions) You do not need to do anything special for this exam.  What should I wear: Wear comfortable clothes.  How long does the exam take: Most scans take less than 5 minutes.  What should I bring: Bring a list of your medicines, including vitamins, minerals and over-the-counter drugs. It is safest to leave personal items at home.  Do I need a :  No  is needed.  What do I need to tell my doctor: Tell your doctor if there s any chance you are pregnant.  What should I do after the exam: No restrictions, You may resume normal activities.  What is this test: An image of a specific body part shown in shades of black and white.  Who should I call with questions: If you have any questions, please call the Imaging Department where you will have your exam. Directions, parking instructions, and other information is available on our website, [email or web address].            Dec 04, 2018  2:00 PM CST   (Arrive by 1:45 PM)   SOT SOCIAL WORK EVAL with Fatuma Fierro [facility] ([facility])    [address]  [address]  [place] [address]   [phone]            Dec 04, 2018  3:00 PM CST   (Arrive by 2:45 PM)   New Patient Visit with Leyda Quintero MD   [facility] ([facility])    [address]  [address]  [place] [address]   [phone]            Dec 04, 2018  4:30 PM CST   (Arrive by 4:15 PM)   New Patient Visit with Dakota Waller MD   [facility] ([facility])    [address]  [address]  [place] [address]   [phone]            Dec 07, 2018 11:30 AM CST   Return Visit with Shaun Aguiar MD   [facility] PHYSICIANS HEART AT [facility] ([facility])    [address] [place] [address] 2nd Floor  [facility] [address]   [phone]            Dec 10, 2018 12:30 PM CST   (Arrive by 12:15 PM)   New Patient Visit with Aaliyah Bradshaw PsyD   [facility] Neuropsychology ([facility])    [address]  3rd Floor  [place] [address]   [phone]              Who to contact     If you have questions or need follow up information about today's clinic visit or your schedule please contact [facility] directly at [phone].  Normal or non-critical lab and imaging results will be communicated to you by MyChart, letter or phone within 4 business days after the clinic has received the results. If you do not hear from us within 7 days, please contact the clinic through MyChart or phone. If you have a critical or abnormal lab result, we will notify you by phone as soon as possible.  Submit refill requests through [employer] or call your pharmacy and they will forward the refill request to us. Please allow 3 business days for your refill to be completed.          Additional Information About Your Visit        [employer] Information     [employer] gives you secure access to your electronic health record. If you see a primary care provider, you can  "also send messages to your care team and make appointments. If you have questions, please call your primary care clinic.  If you do not have a primary care provider, please call [phone] and they will assist you.        Care EveryWhere ID     This is your Care EveryWhere ID. This could be used by other organizations to access your [place] medical records  [id number]        Your Vitals Were     Pulse Temperature Respirations Height Pulse Oximetry BMI (Body Mass Index)    50 97.9  F (36.6  C) (Oral) 20 5' 7\" (1.702 m) 96% 26.78 kg/m2       Blood Pressure from Last 3 Encounters:   11/28/18 108/64   11/19/18 138/75   10/26/18 94/64    Weight from Last 3 Encounters:   11/28/18 171 lb (77.6 kg)   11/19/18 176 lb (79.8 kg)   10/26/18 176 lb (79.8 kg)              Today, you had the following     No orders found for display         Today's Medication Changes          These changes are accurate as of 11/28/18 11:59 PM.  If you have any questions, ask your nurse or doctor.               These medicines have changed or have updated prescriptions.        Dose/Directions    oxyCODONE IR 10 MG tablet   Commonly known as:  ROXICODONE   This may have changed:  when to take this   Used for:  Rib pain, Acute on chronic systolic congestive heart failure (H)   Changed by:  Denise White APRN CNP        Dose:  10 mg   Take 1 tablet (10 mg) by mouth every 8 hours as needed for severe pain   Quantity:  30 tablet   Refills:  0            Where to get your medicines      Some of these will need a paper prescription and others can be bought over the counter.  Ask your nurse if you have questions.     Bring a paper prescription for each of these medications     oxyCODONE IR 10 MG tablet               Information about OPIOIDS     PRESCRIPTION OPIOIDS: WHAT YOU NEED TO KNOW   We gave you an opioid (narcotic) pain medicine. It is important to manage your pain, but opioids are not always the best choice. You should first try " all the other options your care team gave you. Take this medicine for as short a time (and as few doses) as possible.    Some activities can increase your pain, such as bandage changes or therapy sessions. It may help to take your pain medicine 30 to 60 minutes before these activities. Reduce your stress by getting enough sleep, working on hobbies you enjoy and practicing relaxation or meditation. Talk to your care team about ways to manage your pain beyond prescription opioids.    These medicines have risks:    DO NOT drive when on new or higher doses of pain medicine. These medicines can affect your alertness and reaction times, and you could be arrested for driving under the influence (DUI). If you need to use opioids long-term, talk to your care team about driving.    DO NOT operate heavy machinery    DO NOT do any other dangerous activities while taking these medicines.    DO NOT drink any alcohol while taking these medicines.     If the opioid prescribed includes acetaminophen, DO NOT take with any other medicines that contain acetaminophen. Read all labels carefully. Look for the word  acetaminophen  or  Tylenol.  Ask your pharmacist if you have questions or are unsure.    You can get addicted to pain medicines, especially if you have a history of addiction (chemical, alcohol or substance dependence). Talk to your care team about ways to reduce this risk.    All opioids tend to cause constipation. Drink plenty of water and eat foods that have a lot of fiber, such as fruits, vegetables, prune juice, apple juice and high-fiber cereal. Take a laxative (Miralax, milk of magnesia, Colace, Senna) if you don t move your bowels at least every other day. Other side effects include upset stomach, sleepiness, dizziness, throwing up, tolerance (needing more of the medicine to have the same effect), physical dependence and slowed breathing.    Store your pills in a secure place, locked if possible. We will not replace any  lost or stolen medicine. If you don t finish your medicine, please throw away (dispose) as directed by your pharmacist. The [place] Pollution Control Agency has more information about safe disposal: https://www.pca.[facility].mn.us/living-green/managing-unwanted-medications         Primary Care Provider Office Phone # Fax #    VERONICA Hallman [phone] [address] [place] [address]        Equal Access to Services     MAXIMINO TALAMANTES : Hadii aad ku hadasho Soomaali, waaxda luqadaha, qaybta kaalmada adeegyada, waxay idiin hayaan adeeg wyatt sanchez . So [facility] [phone].    ATENCIÓN: Si habla español, tiene a whipple disposición servicios gratuitos de asistencia lingüística. Huyame al [phone].    We comply with applicable federal civil rights laws and [place] laws. We do not discriminate on the basis of race, color, national origin, age, disability, sex, sexual orientation, or gender identity.            Thank you!     Thank you for choosing [facility]  for your care. Our goal is always to provide you with excellent care. Hearing back from our patients is one way we can continue to improve our services. Please take a few minutes to complete the written survey that you may receive in the mail after your visit with us. Thank you!             Your Updated Medication List - Protect others around you: Learn how to safely use, store and throw away your medicines at www.disposemymeds.org.          This list is accurate as of 11/28/18 11:59 PM.  Always use your most recent med list.                   Brand Name Dispense Instructions for use Diagnosis    albuterol 108 (90 Base) MCG/ACT inhaler    PROAIR HFA/PROVENTIL HFA/VENTOLIN HFA    1 Inhaler    Inhale 2 puffs into the lungs every 4 hours as needed for shortness of breath / dyspnea or wheezing    JOHNSON (dyspnea on exertion), Rib pain, Acute on chronic systolic congestive heart failure (H)       aspirin  81 MG tablet    ASA     Take 81 mg by mouth daily        Blood Pressure Monitor Kit     1 kit    1 kit daily    Chronic congestive heart failure, unspecified congestive heart failure type       carvedilol 6.25 MG tablet    COREG    270 tablet    Take 1.5 tablets (9.375 mg) by mouth 2 times daily (with meals)    Hypertrophic nonobstructive cardiomyopathy (H)       furosemide 20 MG tablet    LASIX    90 tablet    Take 1 tablet (20 mg) by mouth 2 times daily Only take if weight up by 3lbs    JOHNSON (dyspnea on exertion), Acute on chronic systolic congestive heart failure (H)       lisinopril 5 MG tablet    PRINIVIL/ZESTRIL    180 tablet    Take 1 tablet (5 mg) by mouth 2 times daily    Hypertrophic nonobstructive cardiomyopathy (H)       oxyCODONE IR 10 MG tablet    ROXICODONE    30 tablet    Take 1 tablet (10 mg) by mouth every 8 hours as needed for severe pain    Rib pain, Acute on chronic systolic congestive heart failure (H)       spironolactone 25 MG tablet    ALDACTONE    45 tablet    Take 0.5 tablets (12.5 mg) by mouth daily    Hypertrophic nonobstructive cardiomyopathy (H)

## 2018-11-29 ENCOUNTER — TELEPHONE (OUTPATIENT)
Dept: CARDIOLOGY | Facility: CLINIC | Age: 53
End: 2018-11-29

## 2018-12-03 DIAGNOSIS — I50.22 CHRONIC SYSTOLIC CONGESTIVE HEART FAILURE (H): Primary | ICD-10-CM

## 2018-12-04 ENCOUNTER — HOSPITAL ENCOUNTER (OUTPATIENT)
Dept: CARDIOLOGY | Facility: CLINIC | Age: 53
Discharge: HOME | End: 2018-12-04
Attending: INTERNAL MEDICINE | Admitting: INTERNAL MEDICINE
Payer: COMMERCIAL

## 2018-12-04 ENCOUNTER — HOSPITAL ENCOUNTER (OUTPATIENT)
Dept: CARDIOLOGY | Facility: CLINIC | Age: 53
End: 2018-12-04
Attending: INTERNAL MEDICINE
Payer: COMMERCIAL

## 2018-12-04 ENCOUNTER — HOSPITAL ENCOUNTER (OUTPATIENT)
Dept: GENERAL RADIOLOGY | Facility: CLINIC | Age: 53
End: 2018-12-04
Attending: INTERNAL MEDICINE
Payer: COMMERCIAL

## 2018-12-04 ENCOUNTER — DOCUMENTATION ONLY (OUTPATIENT)
Dept: CARDIOLOGY | Facility: CLINIC | Age: 53
End: 2018-12-04

## 2018-12-04 DIAGNOSIS — I50.22 CHRONIC SYSTOLIC CONGESTIVE HEART FAILURE (H): ICD-10-CM

## 2018-12-04 LAB
CARDIOLIPIN ANTIBODY IGG: <1.6 GPL-U/ML (ref 0–19.9)
CARDIOLIPIN ANTIBODY IGM: 0.7 MPL-U/ML (ref 0–19.9)
HBA1C MFR BLD: 5.5 % (ref 0–5.6)
HGB FREE PLAS-MCNC: 30 MG/DL

## 2018-12-04 PROCEDURE — 93016 CV STRESS TEST SUPVJ ONLY: CPT | Performed by: INTERNAL MEDICINE

## 2018-12-04 PROCEDURE — 86147 CARDIOLIPIN ANTIBODY EA IG: CPT | Performed by: INTERNAL MEDICINE

## 2018-12-04 PROCEDURE — 93306 TTE W/DOPPLER COMPLETE: CPT | Mod: 26 | Performed by: INTERNAL MEDICINE

## 2018-12-04 PROCEDURE — 83036 HEMOGLOBIN GLYCOSYLATED A1C: CPT | Performed by: INTERNAL MEDICINE

## 2018-12-04 PROCEDURE — 94618 PULMONARY STRESS TESTING: CPT | Mod: 26 | Performed by: INTERNAL MEDICINE

## 2018-12-04 PROCEDURE — 71046 X-RAY EXAM CHEST 2 VIEWS: CPT

## 2018-12-04 PROCEDURE — 93306 TTE W/DOPPLER COMPLETE: CPT

## 2018-12-04 PROCEDURE — 83051 HEMOGLOBIN PLASMA: CPT | Performed by: INTERNAL MEDICINE

## 2018-12-04 PROCEDURE — 36415 COLL VENOUS BLD VENIPUNCTURE: CPT | Performed by: INTERNAL MEDICINE

## 2018-12-04 RX ORDER — ACYCLOVIR 200 MG/1
10 CAPSULE ORAL ONCE
Status: DISCONTINUED | OUTPATIENT
Start: 2018-12-04 | End: 2018-12-05 | Stop reason: HOSPADM

## 2018-12-05 DIAGNOSIS — I50.23 ACUTE ON CHRONIC SYSTOLIC CONGESTIVE HEART FAILURE (H): ICD-10-CM

## 2018-12-05 DIAGNOSIS — R07.81 RIB PAIN: ICD-10-CM

## 2018-12-05 NOTE — TELEPHONE ENCOUNTER
Controlled Substance Refill Request for oxyCODONE IR  (ROXICODONE) 10 MG tablet  This maybe a DUPLICATE.   Problem List Complete:  No     PROVIDER TO CONSIDER COMPLETION OF PROBLEM LIST AND OVERVIEW/CONTROLLED SUBSTANCE AGREEMENT    Last Written Prescription Date:  11-28-18  Last Fill Quantity: 30 tab,   # refills: 0    Last Office Visit with [facility] provider: 11-28-18    Future Office visit:   Next 5 appointments (look out 90 days)     Dec 07, 2018 11:30 AM CST   Return Visit with Shaun Aguiar MD   [facility] PHYSICIANS [facility] AT [facility] ([facility])    [address] [place] [address] [phone]                  Controlled substance agreement on file: No.     Processing:  Fax Rx to [phone] pharmacy   checked in past 3 months?  No, route to RN

## 2018-12-05 NOTE — TELEPHONE ENCOUNTER
Reason for Call:  Medication or medication refill:    Do you use a Mobile Pharmacy?  Name of the pharmacy and phone number for the current request:  [facility] on [address] - [phone]    Name of the medication requested: oxyCODONE IR (ROXICODONE) 10 MG tablet    Other request: Pt has enough pills to last him for the next two days.    Can we leave a detailed message on this number? YES    Phone number patient can be reached at: Home number on file [phone] (home)    Best Time: anytime    Call taken on 12/5/2018 at 8:53 AM by Jillian Wei

## 2018-12-06 RX ORDER — OXYCODONE HYDROCHLORIDE 10 MG/1
10 TABLET ORAL EVERY 8 HOURS PRN
Qty: 30 TABLET | Refills: 0 | Status: CANCELLED | OUTPATIENT
Start: 2018-12-06

## 2018-12-06 NOTE — TELEPHONE ENCOUNTER
I placed a telephone call to the patient to discuss his request for the narcotic refill.  At the time of his clinic appointment with me last week, we talked about a one-time  Prescription to get him through regarding his rib pain, cough etc.  He was to only use the one prescription, and we discussed no refills at that time.  Per the patient, he is continuing his workup with the cardiology group.    He is not sure when he will get in for his surgery.  He is requesting 1 more refill of the narcotics to get him through to his surgery.    I then placed a telephone call to the [facility].  I discussion with Dr. Aguiar regarding this patient, his request for narcotics, his history of narcotic overuse and frequent refill request etc.  Per Dr. Aguiar, he states he does not feel that this patient should have enough pain to need narcotics.  The patient will continue in the workup/surgery for his heart issues.  The patient has been inconsistent with keeping scheduled appointments and this does put him at risk for surgery.  Dr. Aguiar does not recommend narcotics for this patient.    I placed a telephone call back to the patient.  I discussed with him my conversation with Dr. Aguiar.  Narcotics were declined today.  I also told the patient I will not approve any more narcotics for him.  I encouraged him to use symptomatic management such as lifestyle, reducing cough etc.  He will keep his scheduled appointments next week with cardiology, neuropsych, and dental.    He will follow-up with me as needed.

## 2018-12-14 ENCOUNTER — OFFICE VISIT (OUTPATIENT)
Dept: CARDIOLOGY | Facility: CLINIC | Age: 53
End: 2018-12-14
Payer: COMMERCIAL

## 2018-12-14 VITALS
SYSTOLIC BLOOD PRESSURE: 91 MMHG | WEIGHT: 177 LBS | BODY MASS INDEX: 27.72 KG/M2 | HEART RATE: 93 BPM | DIASTOLIC BLOOD PRESSURE: 60 MMHG | OXYGEN SATURATION: 94 %

## 2018-12-14 DIAGNOSIS — R07.81 RIB PAIN: ICD-10-CM

## 2018-12-14 DIAGNOSIS — N18.30 CKD (CHRONIC KIDNEY DISEASE) STAGE 3, GFR 30-59 ML/MIN (H): ICD-10-CM

## 2018-12-14 DIAGNOSIS — I50.20 HEART FAILURE WITH REDUCED EJECTION FRACTION, NYHA CLASS III (H): ICD-10-CM

## 2018-12-14 DIAGNOSIS — I50.22 CHRONIC SYSTOLIC CONGESTIVE HEART FAILURE (H): Primary | ICD-10-CM

## 2018-12-14 DIAGNOSIS — R06.09 DOE (DYSPNEA ON EXERTION): ICD-10-CM

## 2018-12-14 DIAGNOSIS — I50.23 ACUTE ON CHRONIC SYSTOLIC CONGESTIVE HEART FAILURE (H): ICD-10-CM

## 2018-12-14 DIAGNOSIS — E78.2 MIXED HYPERLIPIDEMIA: Primary | ICD-10-CM

## 2018-12-14 DIAGNOSIS — I10 BENIGN ESSENTIAL HYPERTENSION: ICD-10-CM

## 2018-12-14 PROCEDURE — 99214 OFFICE O/P EST MOD 30 MIN: CPT | Performed by: INTERNAL MEDICINE

## 2018-12-14 RX ORDER — ALBUTEROL SULFATE 90 UG/1
2 AEROSOL, METERED RESPIRATORY (INHALATION) EVERY 4 HOURS PRN
Qty: 1 INHALER | Refills: 1 | Status: SHIPPED | OUTPATIENT
Start: 2018-12-14 | End: 2018-12-14

## 2018-12-14 RX ORDER — ALBUTEROL SULFATE 90 UG/1
2 AEROSOL, METERED RESPIRATORY (INHALATION) EVERY 4 HOURS PRN
Qty: 1 INHALER | Refills: 3 | Status: SHIPPED | OUTPATIENT
Start: 2018-12-14 | End: 2019-06-12

## 2018-12-14 NOTE — NURSING NOTE
"Chief Complaint   Patient presents with     Heart Failure     2 wk. Rtc. Multiple test review,Chronic systolic congestive heart failure .Per patient l. rib pain,sob. with stairs,heart palpitations,leg pain        Initial BP 91/60 (BP Location: Left arm, Patient Position: Sitting, Cuff Size: Adult Large)   Pulse 93   Wt 80.3 kg (177 lb)   SpO2 94%   BMI 27.72 kg/m   Estimated body mass index is 27.72 kg/m  as calculated from the following:    Height as of 11/28/18: 1.702 m (5' 7\").    Weight as of this encounter: 80.3 kg (177 lb)..  BP completed using cuff size: large    Yolis Greenwood L.P.N.    "

## 2018-12-14 NOTE — PATIENT INSTRUCTIONS
Thank you for coming to the [facility] @ [place]; please note the following instructions:    1. Albuterol was refilled.    2. CORE follow up in 6 weeks with labs prior .    3. Follow up with Dr. Aguiar in 3 months with labs prior as scheduled.      If you have any questions regarding your visit please contact your care team:     Cardiology  Telephone Number   Leslie TOVAR, RN  Henrique OLMOS, RN   Simran TORRES, GARY TOVAR, PHYLICIA MARTINEZ, ALIYAHN   [phone]    *After hours: [phone]   For scheduling appts:     [phone] or    [phone] (select option 1)    *After hours: [phone]     For the Device Clinic (Pacemakers and ICD's)  RN's :  Alexandra Vu   During business hours: [phone]    *After business hours:  [phone] (select option 4)      Normal test result notifications will be released via [employer] or mailed within 7 business days.  All other test results, will be communicated via telephone once reviewed by your cardiologist.    If you need a medication refill please contact your pharmacy.  Please allow 3 business days for your refill to be completed.    As always, thank you for trusting us with your health care needs!

## 2018-12-14 NOTE — LETTER
12/14/2018      RE: Akshat Fragoso  [address]  [facility] [address]       Dear Colleague,    Thank you for the opportunity to participate in the care of your patient, Akshat Fragoso, at the [facility]ART AT [facility]. Please see a copy of my visit note below.    December 15, 2018     Akshat Fragoso is a pleasant 53 year old male with a past medical history of nonischemic card myopathy (stage C, NYHA III) who presented for routine follow-up.  As noted in previous notes patient does have significantly reduced ejection fraction around 10-15% and has been doing progressively worse over the past couple of months a year.  He continues to have PND orthopnea and significantly reduced exercise tolerance.  Given the above we did initiate LVAD and transplant evaluation that he partially completed thus far.  He continues to have significant back pain and requested narcotics from myself as well as his primary care provider not only for the back pain but also for re-pain associated with his cough.  Other than that he continues to feel about the same again significantly reduced exercise tolerance and symptoms of fatigue and some volume overload.  Continues to have some cough which is associated with pain in the left thorax.  No dizziness lightheadedness no syncope.  No chest pains.  No other complaints.     ROS otherwise negative     PAST MEDICAL HISTORY:  - Nonischemic cardiomyopathy              - Stage C, NYHA III              - Suspected to be due to myocarditis  - Right lumbar radiculopathy      PAST MEDICAL HISTORY:  Past Medical History:   Diagnosis Date     Acute right lumbar radiculopathy 11/2/2015     Acute systolic heart failure (H) 1/10/2017     Cardiomyopathy (H) 1/10/2017     CARDIOVASCULAR SCREENING; LDL GOAL LESS THAN 160 5/9/2010     Personal history of smoking 1/4/2017     Pneumonia 11/2011     FAMILY HISTORY:  History  reviewed. No pertinent family history.     SOCIAL HISTORY:  Social History     Socioeconomic History     Marital status:      Spouse name: Cheryl     Number of children: 2     Years of education: None     Highest education level: None   Social Needs     Financial resource strain: None     Food insecurity - worry: None     Food insecurity - inability: None     Transportation needs - medical: None     Transportation needs - non-medical: None   Occupational History     Occupation: Construction      Employer: SELF   Tobacco Use     Smoking status: Former Smoker     Packs/day: 0.50     Years: 15.00     Pack years: 7.50     Types: Cigarettes     Last attempt to quit: 2016     Years since quittin.0     Smokeless tobacco: Former User     Quit date: 10/24/2011   Substance and Sexual Activity     Alcohol use: No     Alcohol/week: 0.0 oz     Drug use: No     Sexual activity: Yes     Partners: Female     Birth control/protection: Condom   Other Topics Concern     Parent/sibling w/ CABG, MI or angioplasty before 65F 55M? Yes     Comment: both parents had stints placed    Social History Narrative     None     CURRENT MEDICATIONS:  Current Outpatient Medications   Medication     albuterol (PROAIR HFA/PROVENTIL HFA/VENTOLIN HFA) 108 (90 Base) MCG/ACT inhaler     aspirin 81 MG tablet     Blood Pressure Monitor KIT     carvedilol (COREG) 6.25 MG tablet     furosemide (LASIX) 20 MG tablet     lisinopril (PRINIVIL/ZESTRIL) 5 MG tablet     spironolactone (ALDACTONE) 25 MG tablet     No current facility-administered medications for this visit.      ROS:   Constitutional: No fever, chills, or sweats. Weight is 177 lbs 0 oz  ENT: No visual disturbance, ear ache, epistaxis, sore throat.   Allergies/Immunologic: Negative.   Respiratory: No cough, hemoptysis.   Cardiovascular: As per HPI.   GI: No nausea, vomiting, hematemesis, melena, or hematochezia.   : No urinary frequency, dysuria, or hematuria.   Integument:  Negative.   Psychiatric: Pleasant, no major depression noted  Neuro: No focal neurological deficits noted  Endocrinology: Negative.   Musculoskeletal: As per HPI.      EXAM:  BP 91/60 (BP Location: Left arm, Patient Position: Sitting, Cuff Size: Adult Large)   Pulse 93   Wt 80.3 kg (177 lb)   SpO2 94%   BMI 27.72 kg/m     GENERAL APPEARANCE: healthy, alert and no distress  RESPIRATORY: No signs of resp distress. Lungs with rhonchi.  Left hemithoracic chest pain reproduced by palpation over axilla and apical area.  CARDIOVASCULAR:  JVP to 5 cm above clavicle, regular, normal S1+S2 without added sounds or murmurs.  ABDOMEN: ND, soft, non-tender, normal bowel sounds   EXTREMITIES: Warm, well-perfused, no edema   NEUROLOGY: GCS 15/15, pleasant affect.     Labs:  Lab Results   Component Value Date    WBC 11.0 11/19/2018    HGB 15.5 11/19/2018    HCT 48.0 11/19/2018     11/19/2018     11/19/2018    POTASSIUM 4.0 11/19/2018    CHLORIDE 103 11/19/2018    CO2 26 11/19/2018    BUN 24 11/19/2018    CR 1.35 (H) 11/19/2018    GLC 99 11/19/2018    NTBNPI 11,183 (H) 01/09/2017    NTBNP 3,275 (H) 11/19/2018    TROPI 0.033 01/09/2017    AST 21 11/19/2018    ALT 25 11/19/2018    ALKPHOS 78 11/19/2018    BILITOTAL 0.3 11/19/2018    INR 1.01 11/19/2018     Akshat Fragoso is a 53 year old male with a PMHx of nonischemic cardiomyopathy (stage C, NYHA III) who presented for follow-up.  He continues to have symptoms of low output heart failure however on today's examination he appeared euvolemic which was also corroborated by the recently performed right heart catheterization showing essentially normal filling pressures at the time.  As for the most recent evaluation his cardiac index was 2.3, and his filling pressures were normal on right heart catheterization.  He CPX was weak however it would not entirely meet criteria for advanced therapy initiation at this point.  After long discussion discussion with him and his wife  today in the office we agreed that we will continue to follow him very closely in core clinic as well as in the cardiology clinic monitor him for any decompensation and will hold off on advanced therapies at this time.  They understand that this will require again frequent clinic follow-ups repeat right heart catheterization and CPX in about 6 months time.  We will proceed with neuropsychology evaluation at this point.  In addition we did have a long discussion with regards to addiction and this would include recurrent and significant narcotic use.  I am still not certain that his rib pain which is related to cough, justifies heavy narcotic use.  We discussed that he should follow-up with his primary care provider and aunts be seen in a pain clinic for appropriate pain control.  They also understood that any ongoing addiction would prevent advanced therapies especially heart transplantation.  We will continue to follow him very closely and make medication adjustments as indicated.  No medication changes were made today.    Follow up:   3 months, 6 weeks wit [facility]    I appreciate the opportunity to participate in the care of Akshat Fragoso . Please do not hesitate to contact me with any further questions.      Shaun Aguiar MD     [facility] Division of Cardiology

## 2018-12-15 NOTE — PROGRESS NOTES
December 15, 2018     Akshat Fragoso is a pleasant 53 year old male with a past medical history of nonischemic card myopathy (stage C, NYHA III) who presented for routine follow-up.  As noted in previous notes patient does have significantly reduced ejection fraction around 10-15% and has been doing progressively worse over the past couple of months a year.  He continues to have PND orthopnea and significantly reduced exercise tolerance.  Given the above we did initiate LVAD and transplant evaluation that he partially completed thus far.  He continues to have significant back pain and requested narcotics from myself as well as his primary care provider not only for the back pain but also for re-pain associated with his cough.  Other than that he continues to feel about the same again significantly reduced exercise tolerance and symptoms of fatigue and some volume overload.  Continues to have some cough which is associated with pain in the left thorax.  No dizziness lightheadedness no syncope.  No chest pains.  No other complaints.     ROS otherwise negative     PAST MEDICAL HISTORY:  - Nonischemic cardiomyopathy              - Stage C, NYHA III              - Suspected to be due to myocarditis  - Right lumbar radiculopathy      PAST MEDICAL HISTORY:  Past Medical History:   Diagnosis Date     Acute right lumbar radiculopathy 11/2/2015     Acute systolic heart failure (H) 1/10/2017     Cardiomyopathy (H) 1/10/2017     CARDIOVASCULAR SCREENING; LDL GOAL LESS THAN 160 5/9/2010     Personal history of smoking 1/4/2017     Pneumonia 11/2011     FAMILY HISTORY:  History reviewed. No pertinent family history.     SOCIAL HISTORY:  Social History     Socioeconomic History     Marital status:      Spouse name: Cheryl     Number of children: 2     Years of education: None     Highest education level: None   Social Needs     Financial resource strain: None     Food insecurity - worry: None     Food insecurity -  inability: None     Transportation needs - medical: None     Transportation needs - non-medical: None   Occupational History     Occupation: Construction      Employer: SELF   Tobacco Use     Smoking status: Former Smoker     Packs/day: 0.50     Years: 15.00     Pack years: 7.50     Types: Cigarettes     Last attempt to quit: 2016     Years since quittin.0     Smokeless tobacco: Former User     Quit date: 10/24/2011   Substance and Sexual Activity     Alcohol use: No     Alcohol/week: 0.0 oz     Drug use: No     Sexual activity: Yes     Partners: Female     Birth control/protection: Condom   Other Topics Concern     Parent/sibling w/ CABG, MI or angioplasty before 65F 55M? Yes     Comment: both parents had stints placed    Social History Narrative     None     CURRENT MEDICATIONS:  Current Outpatient Medications   Medication     albuterol (PROAIR HFA/PROVENTIL HFA/VENTOLIN HFA) 108 (90 Base) MCG/ACT inhaler     aspirin 81 MG tablet     Blood Pressure Monitor KIT     carvedilol (COREG) 6.25 MG tablet     furosemide (LASIX) 20 MG tablet     lisinopril (PRINIVIL/ZESTRIL) 5 MG tablet     spironolactone (ALDACTONE) 25 MG tablet     No current facility-administered medications for this visit.      ROS:   Constitutional: No fever, chills, or sweats. Weight is 177 lbs 0 oz  ENT: No visual disturbance, ear ache, epistaxis, sore throat.   Allergies/Immunologic: Negative.   Respiratory: No cough, hemoptysis.   Cardiovascular: As per HPI.   GI: No nausea, vomiting, hematemesis, melena, or hematochezia.   : No urinary frequency, dysuria, or hematuria.   Integument: Negative.   Psychiatric: Pleasant, no major depression noted  Neuro: No focal neurological deficits noted  Endocrinology: Negative.   Musculoskeletal: As per HPI.      EXAM:  BP 91/60 (BP Location: Left arm, Patient Position: Sitting, Cuff Size: Adult Large)   Pulse 93   Wt 80.3 kg (177 lb)   SpO2 94%   BMI 27.72 kg/m    GENERAL APPEARANCE: healthy,  alert and no distress  RESPIRATORY: No signs of resp distress. Lungs with rhonchi.  Left hemithoracic chest pain reproduced by palpation over axilla and apical area.  CARDIOVASCULAR:  JVP to 5 cm above clavicle, regular, normal S1+S2 without added sounds or murmurs.  ABDOMEN: ND, soft, non-tender, normal bowel sounds   EXTREMITIES: Warm, well-perfused, no edema   NEUROLOGY: GCS 15/15, pleasant affect.     Labs:  Lab Results   Component Value Date    WBC 11.0 11/19/2018    HGB 15.5 11/19/2018    HCT 48.0 11/19/2018     11/19/2018     11/19/2018    POTASSIUM 4.0 11/19/2018    CHLORIDE 103 11/19/2018    CO2 26 11/19/2018    BUN 24 11/19/2018    CR 1.35 (H) 11/19/2018    GLC 99 11/19/2018    NTBNPI 11,183 (H) 01/09/2017    NTBNP 3,275 (H) 11/19/2018    TROPI 0.033 01/09/2017    AST 21 11/19/2018    ALT 25 11/19/2018    ALKPHOS 78 11/19/2018    BILITOTAL 0.3 11/19/2018    INR 1.01 11/19/2018     Akshat Fragoso is a 53 year old male with a PMHx of nonischemic cardiomyopathy (stage C, NYHA III) who presented for follow-up.  He continues to have symptoms of low output heart failure however on today's examination he appeared euvolemic which was also corroborated by the recently performed right heart catheterization showing essentially normal filling pressures at the time.  As for the most recent evaluation his cardiac index was 2.3, and his filling pressures were normal on right heart catheterization.  He CPX was weak however it would not entirely meet criteria for advanced therapy initiation at this point.  After long discussion discussion with him and his wife today in the office we agreed that we will continue to follow him very closely in core clinic as well as in the cardiology clinic monitor him for any decompensation and will hold off on advanced therapies at this time.  They understand that this will require again frequent clinic follow-ups repeat right heart catheterization and CPX in about 6 months  time.  We will proceed with neuropsychology evaluation at this point.  In addition we did have a long discussion with regards to addiction and this would include recurrent and significant narcotic use.  I am still not certain that his rib pain which is related to cough, justifies heavy narcotic use.  We discussed that he should follow-up with his primary care provider and aunts be seen in a pain clinic for appropriate pain control.  They also understood that any ongoing addiction would prevent advanced therapies especially heart transplantation.  We will continue to follow him very closely and make medication adjustments as indicated.  No medication changes were made today.    Follow up:   3 months, 6 weeks wit [facility]    I appreciate the opportunity to participate in the care of Akshat Fragoso . Please do not hesitate to contact me with any further questions.    Sincerely,      Shaun Aguiar MD     [facility] Division of Cardiology

## 2018-12-20 ENCOUNTER — CARE COORDINATION (OUTPATIENT)
Dept: CARDIOLOGY | Facility: CLINIC | Age: 53
End: 2018-12-20

## 2018-12-20 NOTE — PROGRESS NOTES
Called pt on 12/14 and 12/18 to check in and discuss remaining items on LVAD eval checklist. Left voicemail requesting call back.   Called pt's wife on 12/19 to discuss same things, and left voicemail requesting call back.

## 2019-01-11 ENCOUNTER — CARE COORDINATION (OUTPATIENT)
Dept: CARDIOLOGY | Facility: CLINIC | Age: 54
End: 2019-01-11

## 2019-01-11 ENCOUNTER — ANCILLARY PROCEDURE (OUTPATIENT)
Dept: CARDIOLOGY | Facility: CLINIC | Age: 54
End: 2019-01-11
Attending: INTERNAL MEDICINE
Payer: COMMERCIAL

## 2019-01-11 DIAGNOSIS — I42.9 CARDIOMYOPATHY (H): ICD-10-CM

## 2019-01-11 PROCEDURE — 93295 DEV INTERROG REMOTE 1/2/MLT: CPT | Performed by: INTERNAL MEDICINE

## 2019-01-11 PROCEDURE — 93296 REM INTERROG EVL PM/IDS: CPT | Mod: ZF

## 2019-01-11 NOTE — PROGRESS NOTES
Called pt to discuss finishing LVAD eval - neuropsych assessment, SW consult and a few other consults. Pt did not answer, left voicemail requesting call back.

## 2019-01-26 ENCOUNTER — ANCILLARY PROCEDURE (OUTPATIENT)
Dept: CARDIOLOGY | Facility: CLINIC | Age: 54
End: 2019-01-26
Attending: INTERNAL MEDICINE
Payer: COMMERCIAL

## 2019-01-26 DIAGNOSIS — I42.9 CARDIOMYOPATHY (H): ICD-10-CM

## 2019-01-26 PROCEDURE — 93296 REM INTERROG EVL PM/IDS: CPT | Mod: ZF

## 2019-01-26 PROCEDURE — 93295 DEV INTERROG REMOTE 1/2/MLT: CPT | Performed by: INTERNAL MEDICINE

## 2019-01-30 LAB
MDC_IDC_EPISODE_DTM: NORMAL
MDC_IDC_EPISODE_ID: NORMAL
MDC_IDC_EPISODE_TYPE: NORMAL
MDC_IDC_LEAD_IMPLANT_DT: NORMAL
MDC_IDC_LEAD_LOCATION: NORMAL
MDC_IDC_LEAD_LOCATION_DETAIL_1: NORMAL
MDC_IDC_LEAD_MFG: NORMAL
MDC_IDC_LEAD_MODEL: NORMAL
MDC_IDC_LEAD_POLARITY_TYPE: NORMAL
MDC_IDC_LEAD_SERIAL: NORMAL
MDC_IDC_MSMT_BATTERY_DTM: NORMAL
MDC_IDC_MSMT_BATTERY_REMAINING_LONGEVITY: 144 MO
MDC_IDC_MSMT_BATTERY_REMAINING_PERCENTAGE: 100 %
MDC_IDC_MSMT_BATTERY_STATUS: NORMAL
MDC_IDC_MSMT_CAP_CHARGE_DTM: NORMAL
MDC_IDC_MSMT_CAP_CHARGE_TIME: 9.7 S
MDC_IDC_MSMT_CAP_CHARGE_TYPE: NORMAL
MDC_IDC_MSMT_LEADCHNL_RV_IMPEDANCE_VALUE: 525 OHM
MDC_IDC_MSMT_LEADCHNL_RV_PACING_THRESHOLD_AMPLITUDE: 0.8 V
MDC_IDC_MSMT_LEADCHNL_RV_PACING_THRESHOLD_PULSEWIDTH: 0.4 MS
MDC_IDC_PG_IMPLANT_DTM: NORMAL
MDC_IDC_PG_MFG: NORMAL
MDC_IDC_PG_MODEL: NORMAL
MDC_IDC_PG_SERIAL: NORMAL
MDC_IDC_PG_TYPE: NORMAL
MDC_IDC_SESS_CLINIC_NAME: NORMAL
MDC_IDC_SESS_DTM: NORMAL
MDC_IDC_SESS_TYPE: NORMAL
MDC_IDC_SET_BRADY_LOWRATE: 40 {BEATS}/MIN
MDC_IDC_SET_BRADY_MODE: NORMAL
MDC_IDC_SET_LEADCHNL_RV_PACING_AMPLITUDE: 3.5 V
MDC_IDC_SET_LEADCHNL_RV_PACING_POLARITY: NORMAL
MDC_IDC_SET_LEADCHNL_RV_PACING_PULSEWIDTH: 0.4 MS
MDC_IDC_SET_LEADCHNL_RV_SENSING_ADAPTATION_MODE: NORMAL
MDC_IDC_SET_LEADCHNL_RV_SENSING_POLARITY: NORMAL
MDC_IDC_SET_LEADCHNL_RV_SENSING_SENSITIVITY: 0.6 MV
MDC_IDC_SET_ZONE_DETECTION_INTERVAL: 300 MS
MDC_IDC_SET_ZONE_DETECTION_INTERVAL: 353 MS
MDC_IDC_SET_ZONE_TYPE: NORMAL
MDC_IDC_SET_ZONE_TYPE: NORMAL
MDC_IDC_SET_ZONE_VENDOR_TYPE: NORMAL
MDC_IDC_SET_ZONE_VENDOR_TYPE: NORMAL
MDC_IDC_STAT_BRADY_DTM_END: NORMAL
MDC_IDC_STAT_BRADY_DTM_START: NORMAL
MDC_IDC_STAT_BRADY_RV_PERCENT_PACED: 0 %
MDC_IDC_STAT_EPISODE_RECENT_COUNT: 0
MDC_IDC_STAT_EPISODE_RECENT_COUNT: 3
MDC_IDC_STAT_EPISODE_RECENT_COUNT: 4
MDC_IDC_STAT_EPISODE_RECENT_COUNT_DTM_END: NORMAL
MDC_IDC_STAT_EPISODE_RECENT_COUNT_DTM_START: NORMAL
MDC_IDC_STAT_EPISODE_TYPE: NORMAL
MDC_IDC_STAT_EPISODE_VENDOR_TYPE: NORMAL
MDC_IDC_STAT_TACHYTHERAPY_ATP_DELIVERED_RECENT: 0
MDC_IDC_STAT_TACHYTHERAPY_ATP_DELIVERED_TOTAL: 0
MDC_IDC_STAT_TACHYTHERAPY_RECENT_DTM_END: NORMAL
MDC_IDC_STAT_TACHYTHERAPY_RECENT_DTM_START: NORMAL
MDC_IDC_STAT_TACHYTHERAPY_SHOCKS_ABORTED_RECENT: 0
MDC_IDC_STAT_TACHYTHERAPY_SHOCKS_ABORTED_TOTAL: 0
MDC_IDC_STAT_TACHYTHERAPY_SHOCKS_DELIVERED_RECENT: 0
MDC_IDC_STAT_TACHYTHERAPY_SHOCKS_DELIVERED_TOTAL: 0
MDC_IDC_STAT_TACHYTHERAPY_TOTAL_DTM_END: NORMAL
MDC_IDC_STAT_TACHYTHERAPY_TOTAL_DTM_START: NORMAL

## 2019-02-06 DIAGNOSIS — I50.22 CHRONIC SYSTOLIC HEART FAILURE (H): Primary | ICD-10-CM

## 2019-02-25 DIAGNOSIS — I50.23 ACUTE ON CHRONIC SYSTOLIC CONGESTIVE HEART FAILURE (H): ICD-10-CM

## 2019-02-25 DIAGNOSIS — R06.09 DOE (DYSPNEA ON EXERTION): ICD-10-CM

## 2019-02-25 RX ORDER — ALBUTEROL SULFATE 90 UG/1
2 AEROSOL, METERED RESPIRATORY (INHALATION) EVERY 4 HOURS PRN
Qty: 1 INHALER | Refills: 3 | Status: CANCELLED | OUTPATIENT
Start: 2019-02-25

## 2019-02-25 NOTE — TELEPHONE ENCOUNTER
Received fax on 02/25/19 from [facility] in [place] requesting refill(s) for the following medication(s):    1.   Rx Number:  [id number]   Drug:  Ventolin HFA inh 200 puffs        Sig: Inhale 2 puffs every 4 hours as needed for shortness of breath or wheezing   Prescribed Qty:  18    Last Refill:  01/31/19      Requested P/U Time:  02/25/19      Patient's Last Cardiology Appointment:  12/14/18   Patient's Next Cardiology Appointment:  02/27/19,03/22/19      Refill encounter routed to WILDER Greenwood L.P.N.

## 2019-02-26 NOTE — TELEPHONE ENCOUNTER
Spoke with the pharmacy whom states to disregard refill request.  Patient must have requested a refill using the wrong prescription #.    Leslie Larios RN

## 2019-03-17 ENCOUNTER — ANCILLARY PROCEDURE (OUTPATIENT)
Dept: CARDIOLOGY | Facility: CLINIC | Age: 54
End: 2019-03-17
Attending: INTERNAL MEDICINE
Payer: COMMERCIAL

## 2019-03-17 DIAGNOSIS — I42.9 CARDIOMYOPATHY (H): ICD-10-CM

## 2019-03-17 PROCEDURE — 93296 REM INTERROG EVL PM/IDS: CPT | Mod: ZF

## 2019-03-18 ENCOUNTER — TELEPHONE (OUTPATIENT)
Dept: CARDIOLOGY | Facility: CLINIC | Age: 54
End: 2019-03-18

## 2019-03-18 NOTE — TELEPHONE ENCOUNTER
Patient request to resched. Fri. Appoint. Message received from [facility]  RN., then this writer attempted to contact patient . Yolis Greenwood L.P.N.    Left message to return clinic call to .  Yolis Greenwood L.P.N.

## 2019-03-21 NOTE — TELEPHONE ENCOUNTER
Patient not available by telephone . Yolis Greenwood L.P.N.   confirm KEEPING  03/22/19, AND 03/26/19 appointment NEEDED

## 2019-03-22 DIAGNOSIS — I50.22 CHRONIC SYSTOLIC HEART FAILURE (H): Primary | ICD-10-CM

## 2019-03-22 NOTE — TELEPHONE ENCOUNTER
If he does need to reschedule - can you get him on with [facility] again and tell him to make sure to keep appt with Kat Nowak on 3./26.     Thanks!   willian

## 2019-03-27 LAB
MDC_IDC_EPISODE_DTM: NORMAL
MDC_IDC_EPISODE_ID: NORMAL
MDC_IDC_EPISODE_TYPE: NORMAL
MDC_IDC_LEAD_IMPLANT_DT: NORMAL
MDC_IDC_LEAD_LOCATION: NORMAL
MDC_IDC_LEAD_LOCATION_DETAIL_1: NORMAL
MDC_IDC_LEAD_MFG: NORMAL
MDC_IDC_LEAD_MODEL: NORMAL
MDC_IDC_LEAD_POLARITY_TYPE: NORMAL
MDC_IDC_LEAD_SERIAL: NORMAL
MDC_IDC_MSMT_BATTERY_DTM: NORMAL
MDC_IDC_MSMT_BATTERY_REMAINING_LONGEVITY: 144 MO
MDC_IDC_MSMT_BATTERY_REMAINING_PERCENTAGE: 100 %
MDC_IDC_MSMT_BATTERY_STATUS: NORMAL
MDC_IDC_MSMT_CAP_CHARGE_DTM: NORMAL
MDC_IDC_MSMT_CAP_CHARGE_TIME: 9.7 S
MDC_IDC_MSMT_CAP_CHARGE_TYPE: NORMAL
MDC_IDC_MSMT_LEADCHNL_RV_IMPEDANCE_VALUE: 486 OHM
MDC_IDC_MSMT_LEADCHNL_RV_PACING_THRESHOLD_AMPLITUDE: 0.8 V
MDC_IDC_MSMT_LEADCHNL_RV_PACING_THRESHOLD_PULSEWIDTH: 0.4 MS
MDC_IDC_PG_IMPLANT_DTM: NORMAL
MDC_IDC_PG_MFG: NORMAL
MDC_IDC_PG_MODEL: NORMAL
MDC_IDC_PG_SERIAL: NORMAL
MDC_IDC_PG_TYPE: NORMAL
MDC_IDC_SESS_CLINIC_NAME: NORMAL
MDC_IDC_SESS_DTM: NORMAL
MDC_IDC_SESS_TYPE: NORMAL
MDC_IDC_SET_BRADY_LOWRATE: 40 {BEATS}/MIN
MDC_IDC_SET_BRADY_MODE: NORMAL
MDC_IDC_SET_LEADCHNL_RV_PACING_AMPLITUDE: 3.5 V
MDC_IDC_SET_LEADCHNL_RV_PACING_POLARITY: NORMAL
MDC_IDC_SET_LEADCHNL_RV_PACING_PULSEWIDTH: 0.4 MS
MDC_IDC_SET_LEADCHNL_RV_SENSING_ADAPTATION_MODE: NORMAL
MDC_IDC_SET_LEADCHNL_RV_SENSING_POLARITY: NORMAL
MDC_IDC_SET_LEADCHNL_RV_SENSING_SENSITIVITY: 0.6 MV
MDC_IDC_SET_ZONE_DETECTION_INTERVAL: 300 MS
MDC_IDC_SET_ZONE_DETECTION_INTERVAL: 353 MS
MDC_IDC_SET_ZONE_TYPE: NORMAL
MDC_IDC_SET_ZONE_TYPE: NORMAL
MDC_IDC_SET_ZONE_VENDOR_TYPE: NORMAL
MDC_IDC_SET_ZONE_VENDOR_TYPE: NORMAL
MDC_IDC_STAT_BRADY_DTM_END: NORMAL
MDC_IDC_STAT_BRADY_DTM_START: NORMAL
MDC_IDC_STAT_BRADY_RV_PERCENT_PACED: 0 %
MDC_IDC_STAT_EPISODE_RECENT_COUNT: 0
MDC_IDC_STAT_EPISODE_RECENT_COUNT: 3
MDC_IDC_STAT_EPISODE_RECENT_COUNT: 4
MDC_IDC_STAT_EPISODE_RECENT_COUNT_DTM_END: NORMAL
MDC_IDC_STAT_EPISODE_RECENT_COUNT_DTM_START: NORMAL
MDC_IDC_STAT_EPISODE_TYPE: NORMAL
MDC_IDC_STAT_EPISODE_VENDOR_TYPE: NORMAL
MDC_IDC_STAT_TACHYTHERAPY_ATP_DELIVERED_RECENT: 0
MDC_IDC_STAT_TACHYTHERAPY_ATP_DELIVERED_TOTAL: 0
MDC_IDC_STAT_TACHYTHERAPY_RECENT_DTM_END: NORMAL
MDC_IDC_STAT_TACHYTHERAPY_RECENT_DTM_START: NORMAL
MDC_IDC_STAT_TACHYTHERAPY_SHOCKS_ABORTED_RECENT: 0
MDC_IDC_STAT_TACHYTHERAPY_SHOCKS_ABORTED_TOTAL: 0
MDC_IDC_STAT_TACHYTHERAPY_SHOCKS_DELIVERED_RECENT: 0
MDC_IDC_STAT_TACHYTHERAPY_SHOCKS_DELIVERED_TOTAL: 0
MDC_IDC_STAT_TACHYTHERAPY_TOTAL_DTM_END: NORMAL
MDC_IDC_STAT_TACHYTHERAPY_TOTAL_DTM_START: NORMAL

## 2019-04-05 DIAGNOSIS — I42.2 HYPERTROPHIC NONOBSTRUCTIVE CARDIOMYOPATHY (H): ICD-10-CM

## 2019-04-05 RX ORDER — SPIRONOLACTONE 25 MG/1
12.5 TABLET ORAL DAILY
Qty: 45 TABLET | Refills: 0 | Status: SHIPPED | OUTPATIENT
Start: 2019-04-05 | End: 2019-07-03

## 2019-04-15 LAB
MDC_IDC_EPISODE_DTM: NORMAL
MDC_IDC_EPISODE_DURATION: 13 S
MDC_IDC_EPISODE_DURATION: 13 S
MDC_IDC_EPISODE_DURATION: 16 S
MDC_IDC_EPISODE_ID: NORMAL
MDC_IDC_EPISODE_TYPE: NORMAL
MDC_IDC_EPISODE_VENDOR_TYPE: NORMAL
MDC_IDC_EPISODE_VENDOR_TYPE: NORMAL
MDC_IDC_LEAD_IMPLANT_DT: NORMAL
MDC_IDC_LEAD_LOCATION: NORMAL
MDC_IDC_LEAD_LOCATION_DETAIL_1: NORMAL
MDC_IDC_LEAD_MFG: NORMAL
MDC_IDC_LEAD_MODEL: NORMAL
MDC_IDC_LEAD_POLARITY_TYPE: NORMAL
MDC_IDC_LEAD_SERIAL: NORMAL
MDC_IDC_MSMT_BATTERY_DTM: NORMAL
MDC_IDC_MSMT_BATTERY_REMAINING_LONGEVITY: 144 MO
MDC_IDC_MSMT_BATTERY_REMAINING_PERCENTAGE: 100 %
MDC_IDC_MSMT_BATTERY_STATUS: NORMAL
MDC_IDC_MSMT_CAP_CHARGE_DTM: NORMAL
MDC_IDC_MSMT_CAP_CHARGE_TIME: 9.7 S
MDC_IDC_MSMT_CAP_CHARGE_TYPE: NORMAL
MDC_IDC_MSMT_LEADCHNL_RV_IMPEDANCE_VALUE: 475 OHM
MDC_IDC_MSMT_LEADCHNL_RV_PACING_THRESHOLD_AMPLITUDE: 0.8 V
MDC_IDC_MSMT_LEADCHNL_RV_PACING_THRESHOLD_PULSEWIDTH: 0.4 MS
MDC_IDC_PG_IMPLANT_DTM: NORMAL
MDC_IDC_PG_MFG: NORMAL
MDC_IDC_PG_MODEL: NORMAL
MDC_IDC_PG_SERIAL: NORMAL
MDC_IDC_PG_TYPE: NORMAL
MDC_IDC_SESS_CLINIC_NAME: NORMAL
MDC_IDC_SESS_DTM: NORMAL
MDC_IDC_SESS_TYPE: NORMAL
MDC_IDC_SET_BRADY_LOWRATE: 40 {BEATS}/MIN
MDC_IDC_SET_BRADY_MODE: NORMAL
MDC_IDC_SET_LEADCHNL_RV_PACING_AMPLITUDE: 3.5 V
MDC_IDC_SET_LEADCHNL_RV_PACING_POLARITY: NORMAL
MDC_IDC_SET_LEADCHNL_RV_PACING_PULSEWIDTH: 0.4 MS
MDC_IDC_SET_LEADCHNL_RV_SENSING_ADAPTATION_MODE: NORMAL
MDC_IDC_SET_LEADCHNL_RV_SENSING_POLARITY: NORMAL
MDC_IDC_SET_LEADCHNL_RV_SENSING_SENSITIVITY: 0.6 MV
MDC_IDC_SET_ZONE_DETECTION_INTERVAL: 300 MS
MDC_IDC_SET_ZONE_DETECTION_INTERVAL: 353 MS
MDC_IDC_SET_ZONE_TYPE: NORMAL
MDC_IDC_SET_ZONE_TYPE: NORMAL
MDC_IDC_SET_ZONE_VENDOR_TYPE: NORMAL
MDC_IDC_SET_ZONE_VENDOR_TYPE: NORMAL
MDC_IDC_STAT_BRADY_DTM_END: NORMAL
MDC_IDC_STAT_BRADY_DTM_START: NORMAL
MDC_IDC_STAT_BRADY_RV_PERCENT_PACED: 0 %
MDC_IDC_STAT_EPISODE_RECENT_COUNT: 0
MDC_IDC_STAT_EPISODE_RECENT_COUNT: 3
MDC_IDC_STAT_EPISODE_RECENT_COUNT: 4
MDC_IDC_STAT_EPISODE_RECENT_COUNT_DTM_END: NORMAL
MDC_IDC_STAT_EPISODE_RECENT_COUNT_DTM_START: NORMAL
MDC_IDC_STAT_EPISODE_TYPE: NORMAL
MDC_IDC_STAT_EPISODE_VENDOR_TYPE: NORMAL
MDC_IDC_STAT_TACHYTHERAPY_ATP_DELIVERED_RECENT: 0
MDC_IDC_STAT_TACHYTHERAPY_ATP_DELIVERED_TOTAL: 0
MDC_IDC_STAT_TACHYTHERAPY_RECENT_DTM_END: NORMAL
MDC_IDC_STAT_TACHYTHERAPY_RECENT_DTM_START: NORMAL
MDC_IDC_STAT_TACHYTHERAPY_SHOCKS_ABORTED_RECENT: 0
MDC_IDC_STAT_TACHYTHERAPY_SHOCKS_ABORTED_TOTAL: 0
MDC_IDC_STAT_TACHYTHERAPY_SHOCKS_DELIVERED_RECENT: 0
MDC_IDC_STAT_TACHYTHERAPY_SHOCKS_DELIVERED_TOTAL: 0
MDC_IDC_STAT_TACHYTHERAPY_TOTAL_DTM_END: NORMAL
MDC_IDC_STAT_TACHYTHERAPY_TOTAL_DTM_START: NORMAL

## 2019-04-27 ENCOUNTER — ANCILLARY PROCEDURE (OUTPATIENT)
Dept: GENERAL RADIOLOGY | Facility: CLINIC | Age: 54
End: 2019-04-27
Attending: INTERNAL MEDICINE
Payer: COMMERCIAL

## 2019-04-27 ENCOUNTER — OFFICE VISIT (OUTPATIENT)
Dept: URGENT CARE | Facility: URGENT CARE | Age: 54
End: 2019-04-27
Payer: COMMERCIAL

## 2019-04-27 VITALS
BODY MASS INDEX: 27.72 KG/M2 | SYSTOLIC BLOOD PRESSURE: 121 MMHG | HEART RATE: 97 BPM | OXYGEN SATURATION: 97 % | DIASTOLIC BLOOD PRESSURE: 71 MMHG | TEMPERATURE: 98.4 F | WEIGHT: 177 LBS
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
## 2019-04-27 DIAGNOSIS — S59.912A FOREARM INJURY, LEFT, INITIAL ENCOUNTER: ICD-10-CM

## 2019-04-27 DIAGNOSIS — S49.92XA SHOULDER INJURY, LEFT, INITIAL ENCOUNTER: ICD-10-CM

## 2019-04-27 DIAGNOSIS — S49.92XA INJURY OF CLAVICLE, LEFT, INITIAL ENCOUNTER: ICD-10-CM
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
## 2019-04-27 DIAGNOSIS — S46.002A ROTATOR CUFF INJURY, LEFT, INITIAL ENCOUNTER: ICD-10-CM

## 2019-04-27 PROCEDURE — 73030 X-RAY EXAM OF SHOULDER: CPT | Mod: LT

## 2019-04-27 PROCEDURE — 73000 X-RAY EXAM OF COLLAR BONE: CPT | Mod: LT

## 2019-04-27 PROCEDURE — 73090 X-RAY EXAM OF FOREARM: CPT | Mod: LT

## 2019-04-27 PROCEDURE — 99214 OFFICE O/P EST MOD 30 MIN: CPT | Performed by: INTERNAL MEDICINE

## 2019-04-27 RX ORDER — HYDROCODONE BITARTRATE AND ACETAMINOPHEN 5; 325 MG/1; MG/1
1 TABLET ORAL EVERY 6 HOURS PRN
Qty: 10 TABLET | Refills: 0 | Status: SHIPPED | OUTPATIENT
Start: 2019-04-27 | End: 2019-05-07

## 2019-04-27 NOTE — PROGRESS NOTES
SUBJECTIVE:   Akshat Fragoso is a 54 year old male presenting with a chief complaint of   Chief Complaint   Patient presents with     Urgent Care     Pt in clinic to have eval for left arm pain.     Musculoskeletal Problem       He is an established patient of [facility].    MS Injury/Pain    Onset of symptoms was last night  ago.  Location: left shoulder and forearm  Context:       The injury happened while at home      Mechanism: fall , walking up stairs, tripped over down      Patient experienced immediate pain, no deformity was noted by the patient  Current and Associated symptoms: Pain, Decreased range of motion and nausea    Aggravating Factors: movement  Therapies to improve symptoms include: ice and ibuprofen      Review of Systems    Past Medical History:   Diagnosis Date     Acute right lumbar radiculopathy 11/2/2015     Acute systolic heart failure (H) 1/10/2017     Cardiomyopathy (H) 1/10/2017     CARDIOVASCULAR SCREENING; LDL GOAL LESS THAN 160 5/9/2010     Personal history of smoking 1/4/2017     Pneumonia 11/2011     No family history on file.  Current Outpatient Medications   Medication Sig Dispense Refill     albuterol (PROAIR HFA/PROVENTIL HFA/VENTOLIN HFA) 108 (90 Base) MCG/ACT inhaler Inhale 2 puffs into the lungs every 4 hours as needed for shortness of breath / dyspnea or wheezing 1 Inhaler 3     aspirin 81 MG tablet Take 81 mg by mouth daily       Blood Pressure Monitor KIT 1 kit daily 1 kit 0     carvedilol (COREG) 6.25 MG tablet Take 1.5 tablets (9.375 mg) by mouth 2 times daily (with meals) 270 tablet 3     furosemide (LASIX) 20 MG tablet Take 1 tablet (20 mg) by mouth 2 times daily Only take if weight up by 3lbs 90 tablet 3     HYDROcodone-acetaminophen (NORCO) 5-325 MG tablet Take 1 tablet by mouth every 6 hours as needed for severe pain 10 tablet 0     lisinopril (PRINIVIL/ZESTRIL) 5 MG tablet Take 1 tablet (5 mg) by mouth 2 times daily 180 tablet 3     spironolactone (ALDACTONE) 25 MG  tablet Take 0.5 tablets (12.5 mg) by mouth daily 45 tablet 0     Social History     Tobacco Use     Smoking status: Light Tobacco Smoker     Packs/day: 0.50     Years: 15.00     Pack years: 7.50     Types: Cigarettes     Last attempt to quit: 2016     Years since quittin.4     Smokeless tobacco: Former User     Quit date: 10/24/2011   Substance Use Topics     Alcohol use: No     Alcohol/week: 0.0 oz       OBJECTIVE  /71   Pulse 97   Temp 98.4  F (36.9  C) (Tympanic)   Wt 80.3 kg (177 lb)   SpO2 97%   BMI 27.72 kg/m      Physical Exam   Constitutional: He appears well-developed and well-nourished.   Musculoskeletal:   Patient appears to be in pain and uses his right arm to splint his left arm from movement.    Examination of the left arm was gentle due to pain.  Examination of left clavicle revealed pain at distal clavicle without deformity.  Examination of shoulder revealed greatest tenderness at anterior head of shoulder.  Range of motion is limited due to pain.  There is no pain palpated along mid and distal humerus.  Examination of elbow joint itself was without effusion or pain.  Examination forearm revealed pain along proximal to mid radius.  Ulnar area nontender.  Examination of wrist unremarkable with good range of motion    No effusions, swelling or ecchymosis noted on exam.   Vitals reviewed.      Labs:  No results found for this or any previous visit (from the past 24 hour(s)).    X-Ray was done, my findings are: no fracture of the clavicle, shoulder, humerus, or forearm noted.    ASSESSMENT:      ICD-10-CM    1. Fall on stairs, initial encounter W10.9XXA XR Clavicle Left 2 Views     XR Shoulder Left G/E 3 Views     XR Forearm Left 2 Views     ORTHO  REFERRAL     HYDROcodone-acetaminophen (NORCO) 5-325 MG tablet   2. Shoulder injury, left, initial encounter S49.92XA XR Shoulder Left G/E 3 Views     ORTHO  REFERRAL     HYDROcodone-acetaminophen (NORCO) 5-325 MG tablet    3. Injury of clavicle, left, initial encounter S49.92XA XR Clavicle Left 2 Views     ORTHO  REFERRAL     HYDROcodone-acetaminophen (NORCO) 5-325 MG tablet   4. Forearm injury, left, initial encounter S59.912A XR Forearm Left 2 Views     ORTHO  REFERRAL     HYDROcodone-acetaminophen (NORCO) 5-325 MG tablet   5. Rotator cuff injury, left, initial encounter S46.002A         Medical Decision Making:    Differential Diagnosis:  MS Injury Pain: sprain, fracture and contusion    X-ray shows no fracture.  Concern with patient having decreased range of motion of shoulder joint.  Possibly he tore ligament tendon of the rotator cuff.    Due to his significant pain and decreased range of motion I would like him to see Ortho for further evaluation.  Serious Comorbid Conditions:  Adult:  CHF  Patient does have a history of heart disease and CHF.  He states he is able to take ibuprofen.  Ibuprofen has not been very helpful with his pain      Patient Instructions       Xrays show no obvious fracture   Radiology review pending    vicodin for breakthough pain from ibuprofen     Ice  Shoulder sling    ortho referral due to severity of symptoms         Patient Education     Shoulder Sprain  A sprain is a stretching or tearing of the ligaments that hold a joint together. A sprain may take up to 8 weeks to fully heal, depending on how severe it is. Moderate to severe shoulder sprains are treated with a sling or shoulder immobilizer. Minor sprains can be treated without any special support.  Home care  The following guidelines will help you care for your injury at home:    If a sling was given to you, leave it in place for the time advised by your healthcare provider. If you aren t sure how long to wear it, ask for advice. If the sling becomes loose, adjust it so that your forearm is level with the ground. Your shoulder should feel well supported.    Put an ice pack on the injured area for 20 minutes every 1 to 2 hours  the first day. You can make your own ice pack by putting ice cubes in a plastic bag. A bag of frozen peas or something similar works well too. Wrap the bag in a thin towel. Continue with ice packs 3 to 4 times a day for the next 2 to 3 days. Then use the pack as needed to ease pain and swelling.    You may use acetaminophen or ibuprofen to control pain, unless another pain medicine was prescribed. If you have chronic liver or kidney disease, talk with your healthcare provider before using these medicines. Also talk with your provider if you ve had a stomach ulcer or gastrointestinal bleeding.    Shoulder joints become stiff if left in a sling for too long. You should start range of motion exercises about 7 to 10 days after the injury. Talk with your provider to find out what type of exercises to do and how soon to start.  Follow-up care  Follow up with your healthcare provider, or as advised.  Any X-rays you had today don t show any broken bones, breaks, or fractures. Sometimes fractures don t show up on the first X-ray. Bruises and sprains can sometimes hurt as much as a fracture. These injuries can take time to heal completely. If your symptoms don t improve or they get worse, talk with your provider. You may need a repeat X-ray or other treatments.  When to seek medical advice  Call your healthcare provider right away if any of these occur:    Shoulder pain or swelling in your arm that gets worse    Fingers become cold, blue, numb, or tingly    Large amount of bruising of the shoulder or upper arm    Fever or chills  Date Last Reviewed: 8/1/2016 2000-2018 [employer] [address] [place] [address], [place] [address]. All rights reserved. This information is not intended as a substitute for professional medical care. Always follow your healthcare professional's instructions.

## 2019-04-27 NOTE — PATIENT INSTRUCTIONS
Xrays show no obvious fracture   Radiology review pending    vicodin for breakthough pain from ibuprofen     Ice  Shoulder sling    ortho referral due to severity of symptoms         Patient Education     Shoulder Sprain  A sprain is a stretching or tearing of the ligaments that hold a joint together. A sprain may take up to 8 weeks to fully heal, depending on how severe it is. Moderate to severe shoulder sprains are treated with a sling or shoulder immobilizer. Minor sprains can be treated without any special support.  Home care  The following guidelines will help you care for your injury at home:    If a sling was given to you, leave it in place for the time advised by your healthcare provider. If you aren t sure how long to wear it, ask for advice. If the sling becomes loose, adjust it so that your forearm is level with the ground. Your shoulder should feel well supported.    Put an ice pack on the injured area for 20 minutes every 1 to 2 hours the first day. You can make your own ice pack by putting ice cubes in a plastic bag. A bag of frozen peas or something similar works well too. Wrap the bag in a thin towel. Continue with ice packs 3 to 4 times a day for the next 2 to 3 days. Then use the pack as needed to ease pain and swelling.    You may use acetaminophen or ibuprofen to control pain, unless another pain medicine was prescribed. If you have chronic liver or kidney disease, talk with your healthcare provider before using these medicines. Also talk with your provider if you ve had a stomach ulcer or gastrointestinal bleeding.    Shoulder joints become stiff if left in a sling for too long. You should start range of motion exercises about 7 to 10 days after the injury. Talk with your provider to find out what type of exercises to do and how soon to start.  Follow-up care  Follow up with your healthcare provider, or as advised.  Any X-rays you had today don t show any broken bones, breaks, or fractures.  Sometimes fractures don t show up on the first X-ray. Bruises and sprains can sometimes hurt as much as a fracture. These injuries can take time to heal completely. If your symptoms don t improve or they get worse, talk with your provider. You may need a repeat X-ray or other treatments.  When to seek medical advice  Call your healthcare provider right away if any of these occur:    Shoulder pain or swelling in your arm that gets worse    Fingers become cold, blue, numb, or tingly    Large amount of bruising of the shoulder or upper arm    Fever or chills  Date Last Reviewed: 8/1/2016 2000-2018 [employer]. [address] [place] [address], [place] [address]. All rights reserved. This information is not intended as a substitute for professional medical care. Always follow your healthcare professional's instructions.

## 2019-05-07 ENCOUNTER — OFFICE VISIT (OUTPATIENT)
Dept: CARDIOLOGY | Facility: CLINIC | Age: 54
End: 2019-05-07
Attending: NURSE PRACTITIONER
Payer: COMMERCIAL

## 2019-05-07 VITALS
SYSTOLIC BLOOD PRESSURE: 117 MMHG | WEIGHT: 183.7 LBS | HEART RATE: 83 BPM | OXYGEN SATURATION: 96 % | HEIGHT: 67 IN | DIASTOLIC BLOOD PRESSURE: 77 MMHG | BODY MASS INDEX: 28.83 KG/M2

## 2019-05-07 DIAGNOSIS — I50.22 CHRONIC SYSTOLIC HEART FAILURE (H): ICD-10-CM

## 2019-05-07 DIAGNOSIS — I50.22 CHRONIC SYSTOLIC HEART FAILURE (H): Primary | ICD-10-CM

## 2019-05-07 LAB
ANION GAP SERPL CALCULATED.3IONS-SCNC: 6 MMOL/L (ref 3–14)
BUN SERPL-MCNC: 19 MG/DL (ref 7–30)
CALCIUM SERPL-MCNC: 8.8 MG/DL (ref 8.5–10.1)
CHLORIDE SERPL-SCNC: 103 MMOL/L (ref 94–109)
CO2 SERPL-SCNC: 27 MMOL/L (ref 20–32)
CREAT SERPL-MCNC: 0.94 MG/DL (ref 0.66–1.25)
GFR SERPL CREATININE-BSD FRML MDRD: >90 ML/MIN/{1.73_M2}
GLUCOSE SERPL-MCNC: 132 MG/DL (ref 70–99)
NT-PROBNP SERPL-MCNC: 3122 PG/ML (ref 0–125)
POTASSIUM SERPL-SCNC: 4.4 MMOL/L (ref 3.4–5.3)
SODIUM SERPL-SCNC: 136 MMOL/L (ref 133–144)

## 2019-05-07 PROCEDURE — G0463 HOSPITAL OUTPT CLINIC VISIT: HCPCS | Mod: 25,ZF

## 2019-05-07 PROCEDURE — 80048 BASIC METABOLIC PNL TOTAL CA: CPT | Performed by: NURSE PRACTITIONER

## 2019-05-07 PROCEDURE — 99213 OFFICE O/P EST LOW 20 MIN: CPT | Mod: ZP | Performed by: NURSE PRACTITIONER

## 2019-05-07 PROCEDURE — 83880 ASSAY OF NATRIURETIC PEPTIDE: CPT | Performed by: NURSE PRACTITIONER

## 2019-05-07 PROCEDURE — 36415 COLL VENOUS BLD VENIPUNCTURE: CPT | Performed by: NURSE PRACTITIONER

## 2019-05-07 ASSESSMENT — PAIN SCALES - GENERAL: PAINLEVEL: NO PAIN (0)

## 2019-05-07 ASSESSMENT — MIFFLIN-ST. JEOR: SCORE: 1641.89

## 2019-05-07 NOTE — PATIENT INSTRUCTIONS
Take your medicines every day, as directed    Changes made today:  o Please stop lisinopril  o After 36 hours without lisinopril, begin Entresto 24/26 Twice Daily  o Have Labs drawn 1 week after switching    o    Monitor Your Weight and Symptoms    Contact us if you:      Gain 2 pounds in one day or 5 pounds in one week    Feel more short of breath    Notice more leg swelling    Feel lightheadeded   Change your lifestyle    Limit Salt or Sodium:    2000 mg  Limit Fluids:    2000 mL or approximately 64 ounces  Eat a Heart Healthy Diet    Low in saturated fats  Stay Active:    Aim to move at least 150 minutes every  week         To Contact us    During Business Hours:  [phone], option # 1 (University)  Then option # 3 (medical questions)     After hours, weekends or holidays:   [phone], Option #4  Ask to speak to the On-Call Cardiologist. Inform them you are a CORE/heart failure patient at the [facility].     Use [employer] allows you to communicate directly with your heart team through secure messaging.    [employer] can be accessed any time on your phone, computer, or tablet.    If you need assistance, we'd be happy to help!         Keep your Heart Appointments:      Labs 1 week after switching to Entresto    Return to CORE 2 weeks after switching to Entresto    See Dr. Aguiar in 6-8 weeks

## 2019-05-07 NOTE — NURSING NOTE
Vitals completed successfully and medication reconciled.     Nita Barrera CMA  4:12 PM  Chief Complaint   Patient presents with     New Patient     New CORE; 54 year old male with chronic systolic heart failure presents for initial visit with labs and device check prior.

## 2019-05-07 NOTE — PROGRESS NOTES
HPI  Mr. Akshat Fragoso is a 54 year old male with a relevant past medical history including advanced chronic systolic heart failure secondary to NICM. He was last seen by Dr. Aguiar in 12/2018 when no changes were made though he was asked to follow up in 6 weeks given several worrisome features including borderline hypotension, resting heart rate in the 90's, with borderline cardiac index of 2.3 (11/18). He presents to CORE clinic for follow up.    Mr. Akshat Fragoso is feeling well. He feels short of breath with moderate exertion (climbing 1 flights of stairs or many blocks on a flat surface) if he is able to pace himself. He is more limited by fatigue and often requires a full day of recovery once weekly. He reports more orthopnea with PND-like symptoms/cough several times a week. He reports rare lightheadedness and denies recent syncope.  Mr. Akshat Fragoso denies lower extremity edema.  Appetite is normal and he reports rare early satiety. His weight ranges 14 pounds at home, typically 169-183 pounds. He has been following a sodium restricted diet. He does not adhere to a specific fluid restriction.     [facility]  Past Medical History:   Diagnosis Date     Acute right lumbar radiculopathy 11/2/2015     Acute systolic heart failure (H) 1/10/2017     Cardiomyopathy (H) 1/10/2017     CARDIOVASCULAR SCREENING; LDL GOAL LESS THAN 160 5/9/2010     Personal history of smoking 1/4/2017     Pneumonia 11/2011       Past Surgical History:   Procedure Laterality Date     CARDIAC SURGERY  2016    defibrillator placement     None         No family history on file.    Social History     Socioeconomic History     Marital status:      Spouse name: Cheryl     Number of children: 2     Years of education: Not on file     Highest education level: Not on file   Occupational History     Occupation: Construction      Employer: SELF   Social Needs     Financial resource strain: Not on file     Food insecurity:     Worry: Not on file      Inability: Not on file     Transportation needs:     Medical: Not on file     Non-medical: Not on file   Tobacco Use     Smoking status: Light Tobacco Smoker     Packs/day: 0.50     Years: 15.00     Pack years: 7.50     Types: Cigarettes     Last attempt to quit: 2016     Years since quittin.4     Smokeless tobacco: Former User     Quit date: 10/24/2011   Substance and Sexual Activity     Alcohol use: No     Alcohol/week: 0.0 oz     Drug use: No     Sexual activity: Yes     Partners: Female     Birth control/protection: Condom   Lifestyle     Physical activity:     Days per week: Not on file     Minutes per session: Not on file     Stress: Not on file   Relationships     Social connections:     Talks on phone: Not on file     Gets together: Not on file     Attends Gnosticism service: Not on file     Active member of club or organization: Not on file     Attends meetings of clubs or organizations: Not on file     Relationship status: Not on file     Intimate partner violence:     Fear of current or ex partner: Not on file     Emotionally abused: Not on file     Physically abused: Not on file     Forced sexual activity: Not on file   Other Topics Concern     Parent/sibling w/ CABG, MI or angioplasty before 65F 55M? Yes     Comment: both parents had stints placed    Social History Narrative     Not on file       ALLERGIES  Allergies   Allergen Reactions     No Known Allergies        MEDICATIONS   Current Outpatient Medications on File Prior to Visit:  albuterol (PROAIR HFA/PROVENTIL HFA/VENTOLIN HFA) 108 (90 Base) MCG/ACT inhaler Inhale 2 puffs into the lungs every 4 hours as needed for shortness of breath / dyspnea or wheezing   aspirin 81 MG tablet Take 81 mg by mouth daily   Blood Pressure Monitor KIT 1 kit daily   carvedilol (COREG) 6.25 MG tablet Take 1.5 tablets (9.375 mg) by mouth 2 times daily (with meals)   furosemide (LASIX) 20 MG tablet Take 1 tablet (20 mg) by mouth 2 times daily Only take if  "weight up by 3lbs   HYDROcodone-acetaminophen (NORCO) 5-325 MG tablet Take 1 tablet by mouth every 6 hours as needed for severe pain   lisinopril (PRINIVIL/ZESTRIL) 5 MG tablet Take 1 tablet (5 mg) by mouth 2 times daily   spironolactone (ALDACTONE) 25 MG tablet Take 0.5 tablets (12.5 mg) by mouth daily     No current facility-administered medications on file prior to visit.     ROS:  Constitutional: No fever, chills, or sweats. No weight gain/loss.   ENT: No visual disturbance, ear ache, epistaxis, sore throat.   Allergies/Immunologic: Negative.   Respiratory: No cough, hemoptysis.   Cardiovascular: As per HPI.   GI: No nausea, vomiting, hematemesis, melena, or hematochezia.   : No urinary frequency, dysuria, or hematuria.   Integument: Negative.   Psychiatric: Negative.   Neuro: Negative.   Endocrinology: Negative.   Musculoskeletal: Negative.    EXAM  /77 (BP Location: Right arm, Patient Position: Chair, Cuff Size: Adult Regular)   Pulse 83   Ht 1.702 m (5' 7\")   Wt 83.3 kg (183 lb 11.2 oz)   SpO2 96%   BMI 28.77 kg/m    Vitals:    05/07/19 1610   Weight: 83.3 kg (183 lb 11.2 oz)     General: appears comfortable, alert and articulate  Head: normocephalic, atraumatic  Eyes: anicteric sclera, EOMI  Neck: no adenopathy  Orophyarynx: moist mucosa, no lesions, dentition intact  Heart: regular, S1/S2, no murmur, gallop, rub, estimated JVP is flat  Lungs: Respirations even and unlabored, lungs clear, no rales or wheezing  Abdomen: soft, non-tender, bowel sounds present, no hepatomegaly  Extremities: no clubbing, cyanosis or edema  Neurological: normal speech and affect, no gross motor deficits      LABS  Last Comprehensive Metabolic Panel:  Sodium   Date Value Ref Range Status   05/07/2019 136 133 - 144 mmol/L Final     Potassium   Date Value Ref Range Status   05/07/2019 4.4 3.4 - 5.3 mmol/L Final     Chloride   Date Value Ref Range Status   05/07/2019 103 94 - 109 mmol/L Final     Carbon Dioxide   Date " Value Ref Range Status   05/07/2019 PENDING 20 - 32 mmol/L Incomplete     Anion Gap   Date Value Ref Range Status   05/07/2019 PENDING 3 - 14 mmol/L Incomplete     Glucose   Date Value Ref Range Status   05/07/2019 PENDING 70 - 99 mg/dL Incomplete     Urea Nitrogen   Date Value Ref Range Status   05/07/2019 PENDING 7 - 30 mg/dL Incomplete     Creatinine   Date Value Ref Range Status   05/07/2019 PENDING 0.66 - 1.25 mg/dL Incomplete     GFR Estimate   Date Value Ref Range Status   05/07/2019 PENDING >60 mL/min/[1.73_m2] Incomplete     Calcium   Date Value Ref Range Status   05/07/2019 PENDING 8.5 - 10.1 mg/dL Incomplete       Lab Results   Component Value Date    NTBNPI 11,183 (H) 01/09/2017     Component      Latest Ref Rng & Units 11/19/2018 5/7/2019   N-Terminal Pro Bnp      0 - 125 pg/mL 3,275 (H) 3,122 (H)       No results found for: DIG    MOST RECENT ECHOCARDIOGRAM: 12/4/2018  Interpretation Summary  Left ventricular wall thickness is normal. Severe left ventricular dilation is  present. The Ejection Fraction is estimated at 10-15%. Severe diffuse  hypokinesis is present.  Right ventricular function, chamber size, wall motion, and thickness are  normal.  Mild left atrial enlargement is present. The patent foramen ovale was  demonstrated by agitated saline bubble study . Trace to mild mitral  insufficiency is present. Mild tricuspid insufficiency is present.  The inferior vena cava was normal in size with preserved respiratory  variability. No pericardial effusion is present.     Compared to prior study in 2017, LV is more dilated, RV with normal function,  IVC normal size and now collapses.    CPX 12/2018      ICD check 3/17/19  Patient initiated remote ICD transmission received and reviewed. Device transmission sent per MD orders. Patient has a [employer] single lead ICD. Normal ICD function. No arrhythmias recorded since 1/11/19. Presenting EGM = VS @ 80 bpm.  = 0%. Estimated battery longevity to  NEETA = 12 years. Lead trends appear stable. Patient notified of transmission results via VM.Remote ICD transmissionI have reviewed and interpreted the device interrogation, settings, programming and nurse's summary.  The device is functioning within normal device parameters.     ASSESSMENT AND PLAN  Mr. Akshat Fragoso is a 54 year old male with a relevant past medical history including chronic heart failure with preserved EF secondary to NICM who does appear well. We'll transition to Entresto 24/26 BID after a 36 hour washout period following discontinuation of lisinopril. BMP to be drawn 1 week after he starts Entresto and return to CORE clinic 2 weeks later. He'll see Dr. Aguiar in 6 weeks.    1. Chronic systolic heart failure/HFrEF (EF 10-15%) secondary to nonischemic cardiomyopathy  NYHA Symptom Class II  Stage D  Primary Cardiologist: Cosme; Last seen 12/2018  ACE-I/ARB/ARNi: Titration ongoing  BB yes  Aldosterone antagonist titration ongoing  SCD prophylaxis ICD  %BiV pacing: N/A  Fluid status Euvolemic  Cardiac Rehab: Referral previously placed  Sleep Apnea Evaluation: Not indicated  Remote PA Pressure Monitoring (CardioMems) Deferred while medical therapy is optimized  Remote monitoring: Encouraged to utilize MyChart, coaching offerred  Follow-up labs in 1 week, clinic in 2 and 6 weeks    20 minutes spent in direct care, >50% in counseling

## 2019-05-07 NOTE — LETTER
5/7/2019      RE: Akshat Fragoso  [address]  [facility] [id number]       Dear Colleague,    Thank you for the opportunity to participate in the care of your patient, Akshat Fragoso, at the [facility] HEART [facility]. Please see a copy of my visit note below.    HPI  Mr. Akshat Fragoso is a 54 year old male with a relevant past medical history including advanced chronic systolic heart failure secondary to NICM. He was last seen by Dr. Aguiar in 12/2018 when no changes were made though he was asked to follow up in 6 weeks given several worrisome features including borderline hypotension, resting heart rate in the 90's, with borderline cardiac index of 2.3 (11/18). He presents to CORE clinic for follow up.    Mr. Akshat Fragoso is feeling well. He feels short of breath with moderate exertion (climbing 1 flights of stairs or many blocks on a flat surface) if he is able to pace himself. He is more limited by fatigue and often requires a full day of recovery once weekly. He reports more orthopnea with PND-like symptoms/cough several times a week. He reports rare lightheadedness and denies recent syncope.  Mr. Akshat Fragoso denies lower extremity edema.  Appetite is normal and he reports rare early satiety. His weight ranges 14 pounds at home, typically 169-183 pounds. He has been following a sodium restricted diet. He does not adhere to a specific fluid restriction.     PMH  Past Medical History:   Diagnosis Date     Acute right lumbar radiculopathy 11/2/2015     Acute systolic heart failure (H) 1/10/2017     Cardiomyopathy (H) 1/10/2017     CARDIOVASCULAR SCREENING; LDL GOAL LESS THAN 160 5/9/2010     Personal history of smoking 1/4/2017     Pneumonia 11/2011       Past Surgical History:   Procedure Laterality Date     CARDIAC SURGERY  2016    defibrillator placement     None         No family history on file.    Social History     Socioeconomic History     Marital status:       Spouse name: Cheryl     Number of children: 2     Years of education: Not on file     Highest education level: Not on file   Occupational History     Occupation: Construction      Employer: SELF   Social Needs     Financial resource strain: Not on file     Food insecurity:     Worry: Not on file     Inability: Not on file     Transportation needs:     Medical: Not on file     Non-medical: Not on file   Tobacco Use     Smoking status: Light Tobacco Smoker     Packs/day: 0.50     Years: 15.00     Pack years: 7.50     Types: Cigarettes     Last attempt to quit: 2016     Years since quittin.4     Smokeless tobacco: Former User     Quit date: 10/24/2011   Substance and Sexual Activity     Alcohol use: No     Alcohol/week: 0.0 oz     Drug use: No     Sexual activity: Yes     Partners: Female     Birth control/protection: Condom   Lifestyle     Physical activity:     Days per week: Not on file     Minutes per session: Not on file     Stress: Not on file   Relationships     Social connections:     Talks on phone: Not on file     Gets together: Not on file     Attends Voodoo service: Not on file     Active member of club or organization: Not on file     Attends meetings of clubs or organizations: Not on file     Relationship status: Not on file     Intimate partner violence:     Fear of current or ex partner: Not on file     Emotionally abused: Not on file     Physically abused: Not on file     Forced sexual activity: Not on file   Other Topics Concern     Parent/sibling w/ CABG, MI or angioplasty before 65F 55M? Yes     Comment: both parents had stints placed    Social History Narrative     Not on file       ALLERGIES  Allergies   Allergen Reactions     No Known Allergies        MEDICATIONS   Current Outpatient Medications on File Prior to Visit:  albuterol (PROAIR HFA/PROVENTIL HFA/VENTOLIN HFA) 108 (90 Base) MCG/ACT inhaler Inhale 2 puffs into the lungs every 4 hours as needed for shortness of  "breath / dyspnea or wheezing   aspirin 81 MG tablet Take 81 mg by mouth daily   Blood Pressure Monitor KIT 1 kit daily   carvedilol (COREG) 6.25 MG tablet Take 1.5 tablets (9.375 mg) by mouth 2 times daily (with meals)   furosemide (LASIX) 20 MG tablet Take 1 tablet (20 mg) by mouth 2 times daily Only take if weight up by 3lbs   HYDROcodone-acetaminophen (NORCO) 5-325 MG tablet Take 1 tablet by mouth every 6 hours as needed for severe pain   lisinopril (PRINIVIL/ZESTRIL) 5 MG tablet Take 1 tablet (5 mg) by mouth 2 times daily   spironolactone (ALDACTONE) 25 MG tablet Take 0.5 tablets (12.5 mg) by mouth daily     No current facility-administered medications on file prior to visit.     ROS:  Constitutional: No fever, chills, or sweats. No weight gain/loss.   ENT: No visual disturbance, ear ache, epistaxis, sore throat.   Allergies/Immunologic: Negative.   Respiratory: No cough, hemoptysis.   Cardiovascular: As per HPI.   GI: No nausea, vomiting, hematemesis, melena, or hematochezia.   : No urinary frequency, dysuria, or hematuria.   Integument: Negative.   Psychiatric: Negative.   Neuro: Negative.   Endocrinology: Negative.   Musculoskeletal: Negative.    EXAM  /77 (BP Location: Right arm, Patient Position: Chair, Cuff Size: Adult Regular)   Pulse 83   Ht 1.702 m (5' 7\")   Wt 83.3 kg (183 lb 11.2 oz)   SpO2 96%   BMI 28.77 kg/m     Vitals:    05/07/19 1610   Weight: 83.3 kg (183 lb 11.2 oz)     General: appears comfortable, alert and articulate  Head: normocephalic, atraumatic  Eyes: anicteric sclera, EOMI  Neck: no adenopathy  Orophyarynx: moist mucosa, no lesions, dentition intact  Heart: regular, S1/S2, no murmur, gallop, rub, estimated JVP is flat  Lungs: Respirations even and unlabored, lungs clear, no rales or wheezing  Abdomen: soft, non-tender, bowel sounds present, no hepatomegaly  Extremities: no clubbing, cyanosis or edema  Neurological: normal speech and affect, no gross motor " deficits      LABS  Last Comprehensive Metabolic Panel:  Sodium   Date Value Ref Range Status   05/07/2019 136 133 - 144 mmol/L Final     Potassium   Date Value Ref Range Status   05/07/2019 4.4 3.4 - 5.3 mmol/L Final     Chloride   Date Value Ref Range Status   05/07/2019 103 94 - 109 mmol/L Final     Carbon Dioxide   Date Value Ref Range Status   05/07/2019 PENDING 20 - 32 mmol/L Incomplete     Anion Gap   Date Value Ref Range Status   05/07/2019 PENDING 3 - 14 mmol/L Incomplete     Glucose   Date Value Ref Range Status   05/07/2019 PENDING 70 - 99 mg/dL Incomplete     Urea Nitrogen   Date Value Ref Range Status   05/07/2019 PENDING 7 - 30 mg/dL Incomplete     Creatinine   Date Value Ref Range Status   05/07/2019 PENDING 0.66 - 1.25 mg/dL Incomplete     GFR Estimate   Date Value Ref Range Status   05/07/2019 PENDING >60 mL/min/[1.73_m2] Incomplete     Calcium   Date Value Ref Range Status   05/07/2019 PENDING 8.5 - 10.1 mg/dL Incomplete       Lab Results   Component Value Date    NTBNPI 11,183 (H) 01/09/2017     Component      Latest Ref Rng & Units 11/19/2018 5/7/2019   N-Terminal Pro Bnp      0 - 125 pg/mL 3,275 (H) 3,122 (H)       No results found for: DIG    MOST RECENT ECHOCARDIOGRAM: 12/4/2018  Interpretation Summary  Left ventricular wall thickness is normal. Severe left ventricular dilation is  present. The Ejection Fraction is estimated at 10-15%. Severe diffuse  hypokinesis is present.  Right ventricular function, chamber size, wall motion, and thickness are  normal.  Mild left atrial enlargement is present. The patent foramen ovale was  demonstrated by agitated saline bubble study . Trace to mild mitral  insufficiency is present. Mild tricuspid insufficiency is present.  The inferior vena cava was normal in size with preserved respiratory  variability. No pericardial effusion is present.     Compared to prior study in 2017, LV is more dilated, RV with normal function,  IVC normal size and now  collapses.    CPX 12/2018      ICD check 3/17/19  Patient initiated remote ICD transmission received and reviewed. Device transmission sent per MD orders. Patient has a [employer] single lead ICD. Normal ICD function. No arrhythmias recorded since 1/11/19. Presenting EGM = VS @ 80 bpm.  = 0%. Estimated battery longevity to NEETA = 12 years. Lead trends appear stable. Patient notified of transmission results via VM.Remote ICD transmissionI have reviewed and interpreted the device interrogation, settings, programming and nurse's summary.  The device is functioning within normal device parameters.     ASSESSMENT AND PLAN  Mr. Akshat Fragoso is a 54 year old male with a relevant past medical history including chronic heart failure with preserved EF secondary to NICM who does appear well. We'll transition to Entresto 24/26 BID after a 36 hour washout period following discontinuation of lisinopril. BMP to be drawn 1 week after he starts Entresto and return to CORE clinic 2 weeks later. He'll see Dr. Aguiar in 6 weeks.    1. Chronic systolic heart failure/HFrEF (EF 10-15%) secondary to nonischemic cardiomyopathy  NYHA Symptom Class II  Stage D  Primary Cardiologist: Cosme; Last seen 12/2018  ACE-I/ARB/ARNi: Titration ongoing  BB yes  Aldosterone antagonist titration ongoing  SCD prophylaxis ICD  %BiV pacing: N/A  Fluid status Euvolemic  Cardiac Rehab: Referral previously placed  Sleep Apnea Evaluation: Not indicated  Remote PA Pressure Monitoring (CardioMems) Deferred while medical therapy is optimized  Remote monitoring: Encouraged to utilize MyChart, coaching offerred  Follow-up labs in 1 week, clinic in 2 and 6 weeks    20 minutes spent in direct care, >50% in counseling      Please do not hesitate to contact me if you have any questions/concerns.     Sincerely,     Lucina Velazquez NP

## 2019-05-09 DIAGNOSIS — I50.22 CHRONIC SYSTOLIC CONGESTIVE HEART FAILURE (H): Primary | ICD-10-CM

## 2019-06-12 ENCOUNTER — TELEPHONE (OUTPATIENT)
Dept: CARDIOLOGY | Facility: CLINIC | Age: 54
End: 2019-06-12

## 2019-06-12 DIAGNOSIS — R06.09 DOE (DYSPNEA ON EXERTION): ICD-10-CM

## 2019-06-12 DIAGNOSIS — I50.23 ACUTE ON CHRONIC SYSTOLIC CONGESTIVE HEART FAILURE (H): ICD-10-CM

## 2019-06-12 RX ORDER — ALBUTEROL SULFATE 90 UG/1
2 AEROSOL, METERED RESPIRATORY (INHALATION) EVERY 4 HOURS PRN
Qty: 1 INHALER | Refills: 0 | Status: SHIPPED | OUTPATIENT
Start: 2019-06-12 | End: 2019-10-02

## 2019-06-12 NOTE — TELEPHONE ENCOUNTER
Signed Prescriptions:                        Disp   Refills    albuterol (PROAIR HFA/PROVENTIL HFA/VENTOL*1 Inha*0        Sig: Inhale 2 puffs into the lungs every 4 hours as needed           for shortness of breath / dyspnea or wheezing  Authorizing Provider: RICARDO BELTRÁN  Ordering User: LESLIE LARIOS    rx filled x1 only.  Patient advised to follow up with primary care for future refills.  Also advised patient that he is overdue for an appt with CORE clinic    Leslie Larios RN

## 2019-06-12 NOTE — TELEPHONE ENCOUNTER
Health Call Center    Phone Message    May a detailed message be left on voicemail: yes    Reason for Call: Medication Refill Request    Has the patient contacted the pharmacy for the refill? Yes   Name of medication being requested: albuterol (PROAIR HFA/PROVENTIL HFA/VENTOLIN HFA) 108 (90 Base) MCG/ACT inhaler  Provider who prescribed the medication: Dr. Aguiar  Pharmacy: [facility]  Date medication is needed: 06/12 - Pt's pharmacy said they've been trying to refill this for 3 days. Pt is in urgent need of this refill.         Action Taken: Message routed to:  [facility] Cardiology

## 2019-07-03 ENCOUNTER — PATIENT OUTREACH (OUTPATIENT)
Dept: CARDIOLOGY | Facility: CLINIC | Age: 54
End: 2019-07-03

## 2019-07-03 ENCOUNTER — ANCILLARY PROCEDURE (OUTPATIENT)
Dept: CARDIOLOGY | Facility: CLINIC | Age: 54
End: 2019-07-03
Attending: NURSE PRACTITIONER
Payer: COMMERCIAL

## 2019-07-03 VITALS
DIASTOLIC BLOOD PRESSURE: 67 MMHG | HEART RATE: 78 BPM | BODY MASS INDEX: 28.09 KG/M2 | HEIGHT: 67 IN | OXYGEN SATURATION: 94 % | SYSTOLIC BLOOD PRESSURE: 100 MMHG | WEIGHT: 179 LBS

## 2019-07-03 DIAGNOSIS — I51.89 OTHER ILL-DEFINED HEART DISEASES: ICD-10-CM

## 2019-07-03 DIAGNOSIS — N52.2 DRUG-INDUCED ERECTILE DYSFUNCTION: Primary | ICD-10-CM

## 2019-07-03 DIAGNOSIS — I42.9 CARDIOMYOPATHY (H): ICD-10-CM

## 2019-07-03 DIAGNOSIS — I50.23 ACUTE ON CHRONIC SYSTOLIC CONGESTIVE HEART FAILURE (H): ICD-10-CM

## 2019-07-03 DIAGNOSIS — I50.22 CHRONIC SYSTOLIC HEART FAILURE (H): ICD-10-CM

## 2019-07-03 DIAGNOSIS — N52.9 ED (ERECTILE DYSFUNCTION): ICD-10-CM

## 2019-07-03 DIAGNOSIS — I42.2 HYPERTROPHIC NONOBSTRUCTIVE CARDIOMYOPATHY (H): ICD-10-CM

## 2019-07-03 DIAGNOSIS — R06.09 DOE (DYSPNEA ON EXERTION): ICD-10-CM

## 2019-07-03 PROCEDURE — G0463 HOSPITAL OUTPT CLINIC VISIT: HCPCS

## 2019-07-03 PROCEDURE — 99215 OFFICE O/P EST HI 40 MIN: CPT | Mod: GC | Performed by: INTERNAL MEDICINE

## 2019-07-03 RX ORDER — LISINOPRIL 5 MG/1
5 TABLET ORAL 2 TIMES DAILY
Qty: 180 TABLET | Refills: 3 | Status: SHIPPED | OUTPATIENT
Start: 2019-07-03 | End: 2020-03-26

## 2019-07-03 RX ORDER — TADALAFIL 10 MG/1
TABLET ORAL
Qty: 20 TABLET | Refills: 1 | Status: SHIPPED | OUTPATIENT
Start: 2019-07-03 | End: 2019-08-26

## 2019-07-03 RX ORDER — FUROSEMIDE 20 MG
20 TABLET ORAL 2 TIMES DAILY
Qty: 180 TABLET | Refills: 3 | Status: SHIPPED | OUTPATIENT
Start: 2019-07-03 | End: 2020-09-21

## 2019-07-03 RX ORDER — CARVEDILOL 12.5 MG/1
12.5 TABLET ORAL 2 TIMES DAILY WITH MEALS
Qty: 180 TABLET | Refills: 3 | Status: SHIPPED | OUTPATIENT
Start: 2019-07-03 | End: 2020-03-26

## 2019-07-03 RX ORDER — LISINOPRIL 5 MG/1
5 TABLET ORAL DAILY
COMMUNITY
End: 2019-07-03

## 2019-07-03 RX ORDER — LIDOCAINE 40 MG/G
CREAM TOPICAL
Status: CANCELLED | OUTPATIENT
Start: 2019-07-03

## 2019-07-03 RX ORDER — SPIRONOLACTONE 25 MG/1
12.5 TABLET ORAL DAILY
Qty: 45 TABLET | Refills: 3 | Status: SHIPPED | OUTPATIENT
Start: 2019-07-03 | End: 2020-02-10

## 2019-07-03 RX ORDER — SILDENAFIL 50 MG/1
25 TABLET, FILM COATED ORAL DAILY PRN
Qty: 20 TABLET | Refills: 1 | Status: SHIPPED | OUTPATIENT
Start: 2019-07-03 | End: 2019-07-03

## 2019-07-03 ASSESSMENT — MIFFLIN-ST. JEOR: SCORE: 1620.57

## 2019-07-03 ASSESSMENT — PAIN SCALES - GENERAL: PAINLEVEL: NO PAIN (0)

## 2019-07-03 NOTE — PROGRESS NOTES
July 3, 2019      Akshat Fragoso is a pleasant 54 year old male with a past medical history of nonischemic card myopathy (stage C, NYHA III) who presented for routine follow-up.  As noted in previous notes patient does have significantly reduced ejection fraction around 10-15% and has been initially been doing progressively worse. Given the above we did initiate LVAD and transplant evaluation that he partially completed thus far. He was last seen in this clinic on December 2018. Since then, he has been [facility]. He was placed on entresto but was only able to tolerate it for a week then discontinued it. He mentioned that he was having significant dizziness, lightheadedness. He verbalized that he is able to tolerate his current medications well. Denied any chest pain, syncopal events, lower extremity swelling. He continues to have shortness of breath with walking 1 block and 1 flight of stairs. He has to stop midway and catch his breath. He also endorsed that he has been using his albuterol inhaler more frequently (almost daily). He continues to smoke but denied any alcohol or illicit drug use. He mentioned that he has been having worsening lower extremity pain at the end of the day for which he takes percocet as needed. 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 No other episodes of dizziness, lightheadedness since discontinuing entresto.      ROS otherwise negative     PAST MEDICAL HISTORY:  - Nonischemic cardiomyopathy              - Stage C, NYHA III              - Suspected to be due to myocarditis  - Right lumbar radiculopathy      PAST MEDICAL HISTORY:  Past Medical History:   Diagnosis Date     Acute right lumbar radiculopathy 11/2/2015     Acute systolic heart failure (H) 1/10/2017     Cardiomyopathy (H) 1/10/2017     CARDIOVASCULAR SCREENING; LDL GOAL LESS THAN 160 5/9/2010     Personal history of smoking 1/4/2017      Pneumonia 2011     FAMILY HISTORY:  No family history on file.     SOCIAL HISTORY:  Social History     Socioeconomic History     Marital status:      Spouse name: Cheryl     Number of children: 2     Years of education: None     Highest education level: None   Social Needs     Financial resource strain: None     Food insecurity - worry: None     Food insecurity - inability: None     Transportation needs - medical: None     Transportation needs - non-medical: None   Occupational History     Occupation: Construction      Employer: SELF   Tobacco Use     Smoking status: Former Smoker     Packs/day: 0.50     Years: 15.00     Pack years: 7.50     Types: Cigarettes     Last attempt to quit: 2016     Years since quittin.0     Smokeless tobacco: Former User     Quit date: 10/24/2011   Substance and Sexual Activity     Alcohol use: No     Alcohol/week: 0.0 oz     Drug use: No     Sexual activity: Yes     Partners: Female     Birth control/protection: Condom   Other Topics Concern     Parent/sibling w/ CABG, MI or angioplasty before 65F 55M? Yes     Comment: both parents had stints placed    Social History Narrative     None     CURRENT MEDICATIONS:  Current Outpatient Medications   Medication     albuterol (PROAIR HFA/PROVENTIL HFA/VENTOLIN HFA) 108 (90 Base) MCG/ACT inhaler     aspirin 81 MG tablet     Blood Pressure Monitor KIT     carvedilol (COREG) 12.5 MG tablet     furosemide (LASIX) 20 MG tablet     lisinopril (PRINIVIL/ZESTRIL) 5 MG tablet     sacubitril-valsartan (ENTRESTO) 24-26 MG per tablet     sildenafil (VIAGRA) 50 MG tablet     spironolactone (ALDACTONE) 25 MG tablet     No current facility-administered medications for this visit.      ROS:   Constitutional: No fever, chills, or sweats. Weight is 179 lbs 0 oz  ENT: No visual disturbance, ear ache, epistaxis, sore throat.   Allergies/Immunologic: Negative.   Respiratory: No cough, hemoptysis.   Cardiovascular: As per HPI.   GI: No  "nausea, vomiting, hematemesis, melena, or hematochezia.   : No urinary frequency, dysuria, or hematuria.   Integument: Negative.   Psychiatric: Pleasant, no major depression noted  Neuro: No focal neurological deficits noted  Endocrinology: Negative.   Musculoskeletal: As per HPI.      EXAM:  /67 (BP Location: Left arm, Patient Position: Chair, Cuff Size: Adult Regular)   Pulse 78   Ht 1.702 m (5' 7\")   Wt 81.2 kg (179 lb)   SpO2 94%   BMI 28.04 kg/m    GENERAL APPEARANCE: healthy, alert and no distress  RESPIRATORY: No signs of resp distress. Lungs with rhonchi.  Left hemithoracic chest pain reproduced by palpation over axilla and apical area.  CARDIOVASCULAR:  nondistended JVP, regular, normal S1+S2 without added sounds or murmurs.  ABDOMEN: ND, soft, non-tender, normal bowel sounds   EXTREMITIES: Warm, well-perfused, no edema   NEUROLOGY: GCS 15/15, pleasant affect.     Labs:  Lab Results   Component Value Date    WBC 11.0 11/19/2018    HGB 15.5 11/19/2018    HCT 48.0 11/19/2018     11/19/2018     05/07/2019    POTASSIUM 4.4 05/07/2019    CHLORIDE 103 05/07/2019    CO2 27 05/07/2019    BUN 19 05/07/2019    CR 0.94 05/07/2019     (H) 05/07/2019    NTBNPI 11,183 (H) 01/09/2017    NTBNP 3,122 (H) 05/07/2019    TROPI 0.033 01/09/2017    AST 21 11/19/2018    ALT 25 11/19/2018    ALKPHOS 78 11/19/2018    BILITOTAL 0.3 11/19/2018    INR 1.01 11/19/2018     Akshat Fragoso is a 53 year old male with a PMHx of nonischemic cardiomyopathy (stage C, NYHA III) who presented for follow-up.  He continues to have some symptoms of low output heart failure however on today's examination he appeared euvolemic which was also corroborated by the recently performed right heart catheterization showing essentially normal filling pressures at the time.  As for the most recent evaluation, his cardiac index was 2.3, and his filling pressures were normal on right heart catheterization.  He CPX was weak however " it would not entirely meet criteria for advanced therapy initiation at this point.      Since his last visit, he has been [facility] with [facility]. He was on entresto briefly but could not tolerate it due to dizziness and lightheadedness. He reported good compliance with medications and has been checking his blood pressure at home. 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 He appeared euvolemic on exam today. Given his symptoms and blood pressure, will increase coreg to 12.5mg BID. Advised patient to stop smoking. He should obtain repeat TTE, RHC, CPX in 11/2019 as well as physician follow-up.     Seen and discussed with attending.   Shelby Faith MD   PGY 4, Cardiology Fellow    I have seen and evaluated the patient and agree with the assessment and plan as above.  Shaun Aguiar MD

## 2019-07-03 NOTE — LETTER
7/3/2019      RE: Akshat Fragoso  [address]  [facility] [address]       Dear Colleague,    Thank you for the opportunity to participate in the care of your patient, Akshat Fragoso, at the [facility]. Please see a copy of my visit note below.    July 3, 2019      Akshat Fragoso is a pleasant 54 year old male with a past medical history of nonischemic card myopathy (stage C, NYHA III) who presented for routine follow-up.  As noted in previous notes patient does have significantly reduced ejection fraction around 10-15% and has been initially been doing progressively worse. Given the above we did initiate LVAD and transplant evaluation that he partially completed thus far. He was last seen in this clinic on December 2018. Since then, he has been following up at the [facility]. He was placed on entresto but was only able to tolerate it for a week then discontinued it. He mentioned that he was having significant dizziness, lightheadedness. He verbalized that he is able to tolerate his current medications well. Denied any chest pain, syncopal events, lower extremity swelling. He continues to have shortness of breath with walking 1 block and 1 flight of stairs. He has to stop midway and catch his breath. He also endorsed that he has been using his albuterol inhaler more frequently (almost daily). He continues to smoke but denied any alcohol or illicit drug use. He mentioned that he has been having worsening lower extremity pain at the end of the day for which he takes percocet as needed. 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 No other episodes of dizziness, lightheadedness since discontinuing entresto.      ROS otherwise negative     PAST MEDICAL HISTORY:  - Nonischemic cardiomyopathy              - Stage C, NYHA III              - Suspected to be due to myocarditis  - Right lumbar  radiculopathy      PAST MEDICAL HISTORY:  Past Medical History:   Diagnosis Date     Acute right lumbar radiculopathy 2015     Acute systolic heart failure (H) 1/10/2017     Cardiomyopathy (H) 1/10/2017     CARDIOVASCULAR SCREENING; LDL GOAL LESS THAN 160 2010     Personal history of smoking 2017     Pneumonia 2011     FAMILY HISTORY:  No family history on file.     SOCIAL HISTORY:  Social History     Socioeconomic History     Marital status:      Spouse name: Cheryl     Number of children: 2     Years of education: None     Highest education level: None   Social Needs     Financial resource strain: None     Food insecurity - worry: None     Food insecurity - inability: None     Transportation needs - medical: None     Transportation needs - non-medical: None   Occupational History     Occupation: Construction      Employer: SELF   Tobacco Use     Smoking status: Former Smoker     Packs/day: 0.50     Years: 15.00     Pack years: 7.50     Types: Cigarettes     Last attempt to quit: 2016     Years since quittin.0     Smokeless tobacco: Former User     Quit date: 10/24/2011   Substance and Sexual Activity     Alcohol use: No     Alcohol/week: 0.0 oz     Drug use: No     Sexual activity: Yes     Partners: Female     Birth control/protection: Condom   Other Topics Concern     Parent/sibling w/ CABG, MI or angioplasty before 65F 55M? Yes     Comment: both parents had stints placed    Social History Narrative     None     CURRENT MEDICATIONS:  Current Outpatient Medications   Medication     albuterol (PROAIR HFA/PROVENTIL HFA/VENTOLIN HFA) 108 (90 Base) MCG/ACT inhaler     aspirin 81 MG tablet     Blood Pressure Monitor KIT     carvedilol (COREG) 12.5 MG tablet     furosemide (LASIX) 20 MG tablet     lisinopril (PRINIVIL/ZESTRIL) 5 MG tablet     sacubitril-valsartan (ENTRESTO) 24-26 MG per tablet     sildenafil (VIAGRA) 50 MG tablet     spironolactone (ALDACTONE) 25 MG tablet     No  "current facility-administered medications for this visit.      ROS:   Constitutional: No fever, chills, or sweats. Weight is 179 lbs 0 oz  ENT: No visual disturbance, ear ache, epistaxis, sore throat.   Allergies/Immunologic: Negative.   Respiratory: No cough, hemoptysis.   Cardiovascular: As per HPI.   GI: No nausea, vomiting, hematemesis, melena, or hematochezia.   : No urinary frequency, dysuria, or hematuria.   Integument: Negative.   Psychiatric: Pleasant, no major depression noted  Neuro: No focal neurological deficits noted  Endocrinology: Negative.   Musculoskeletal: As per HPI.      EXAM:  /67 (BP Location: Left arm, Patient Position: Chair, Cuff Size: Adult Regular)   Pulse 78   Ht 1.702 m (5' 7\")   Wt 81.2 kg (179 lb)   SpO2 94%   BMI 28.04 kg/m     GENERAL APPEARANCE: healthy, alert and no distress  RESPIRATORY: No signs of resp distress. Lungs with rhonchi.  Left hemithoracic chest pain reproduced by palpation over axilla and apical area.  CARDIOVASCULAR:   nondistended JVP, regular, normal S1+S2 without added sounds or murmurs.  ABDOMEN: ND, soft, non-tender, normal bowel sounds   EXTREMITIES: Warm, well-perfused, no edema   NEUROLOGY: GCS 15/15, pleasant affect.     Labs:  Lab Results   Component Value Date    WBC 11.0 11/19/2018    HGB 15.5 11/19/2018    HCT 48.0 11/19/2018     11/19/2018     05/07/2019    POTASSIUM 4.4 05/07/2019    CHLORIDE 103 05/07/2019    CO2 27 05/07/2019    BUN 19 05/07/2019    CR 0.94 05/07/2019     (H) 05/07/2019    NTBNPI 11,183 (H) 01/09/2017    NTBNP 3,122 (H) 05/07/2019    TROPI 0.033 01/09/2017    AST 21 11/19/2018    ALT 25 11/19/2018    ALKPHOS 78 11/19/2018    BILITOTAL 0.3 11/19/2018    INR 1.01 11/19/2018     Akshat Fragoso is a 53 year old male with a PMHx of nonischemic cardiomyopathy (stage C, NYHA III) who presented for follow-up.  He continues to have some symptoms of low output heart failure however on today's examination he " appeared euvolemic which was also corroborated by the recently performed right heart catheterization showing essentially normal filling pressures at the time.  As for the most recent evaluation, his cardiac index was 2.3, and his filling pressures were normal on right heart catheterization.  He CPX was weak however it would not entirely meet criteria for advanced therapy initiation at this point.      Since his last visit, he has been [facility] with [facility]. He was on entresto briefly but could not tolerate it due to dizziness and lightheadedness. He reported good compliance with medications and has been checking his blood pressure at home. 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 He appeared euvolemic on exam today. Given his symptoms and blood pressure, will increase coreg to 12.5mg BID. Advised patient to stop smoking. He should obtain repeat TTE, RHC, CPX in 11/2019 as well as physician follow-up.     Seen and discussed with attending.   Shelby Faith MD   PGY 4, Cardiology Fellow    I have seen and evaluated the patient and agree with the assessment and plan as above.  Shaun Aguiar MD

## 2019-07-03 NOTE — NURSING NOTE
Chief Complaint   Patient presents with     Follow Up      chronic systolic heart failure secondary to NICM       Vitals were taken and medications were reconciled.    Ilana Marx CMA    10:31 AM

## 2019-07-03 NOTE — PATIENT INSTRUCTIONS
It was a pleasure to see you in clinic today. Please do not hesitate to call with any questions or concerns. You are scheduled for a remote ICD transmission on 10/9/19. This will happen automatically in the night. We will call in 1-2 business days to discuss the results with you. We look forward to seeing you in clinic at your next device check in 6 months.    Constance Bush, RN  Electrophysiology Nurse Clinician  [facility]  During business hours call:  [phone]  After business hours please call: [phone]- select option #4 and ask for job code [phone].

## 2019-07-03 NOTE — PROGRESS NOTES
Patient states that Viagra is too expensive. He is wondering if there is a cheaper medication. Provider notified and new orders received.  Date: 7/3/2019    Time of Call: 1:41 PM     Diagnosis:  Erectile dysfunction  [ VORB ] Ordering provider: Dr. Aguiar  Order: Discontinue Viagra  Start Cialis 5 MG prn ED     Order received by: Mando SANCHEZ RN     Follow-up/additional notes: Prescription sent to pharmacy and patient notified.

## 2019-07-03 NOTE — PATIENT INSTRUCTIONS
Please increase Coreg to 12.5 MG twice daily.  Keep taking Lisinopril 5 MG twice daily.    The right heart cath is schedule for November 20th, arriving at 8:00 am at the [facility] in the [facility] waiting room. Please don't eat or drink for 6 hours prior to procedure. The hospital is located at [address] [place] [address], [facility].    Thank you for your visit today.  Please call me with any questions or concerns.   Mando Kay RN  Cardiology Care Coordinator  [phone], press option 1 then option 4

## 2019-07-13 LAB
MDC_IDC_LEAD_IMPLANT_DT: NORMAL
MDC_IDC_LEAD_LOCATION: NORMAL
MDC_IDC_LEAD_LOCATION_DETAIL_1: NORMAL
MDC_IDC_LEAD_MFG: NORMAL
MDC_IDC_LEAD_MODEL: NORMAL
MDC_IDC_LEAD_POLARITY_TYPE: NORMAL
MDC_IDC_LEAD_SERIAL: NORMAL
MDC_IDC_MSMT_BATTERY_DTM: NORMAL
MDC_IDC_MSMT_BATTERY_REMAINING_LONGEVITY: 144 MO
MDC_IDC_MSMT_BATTERY_STATUS: NORMAL
MDC_IDC_MSMT_CAP_CHARGE_DTM: NORMAL
MDC_IDC_MSMT_CAP_CHARGE_ENERGY: 0 J
MDC_IDC_MSMT_CAP_CHARGE_TIME: 0 S
MDC_IDC_MSMT_CAP_CHARGE_TIME: 9.77
MDC_IDC_MSMT_CAP_CHARGE_TYPE: NORMAL
MDC_IDC_MSMT_CAP_CHARGE_TYPE: NORMAL
MDC_IDC_MSMT_LEADCHNL_RV_IMPEDANCE_VALUE: 491 OHM
MDC_IDC_MSMT_LEADCHNL_RV_PACING_THRESHOLD_AMPLITUDE: 0.9 V
MDC_IDC_MSMT_LEADCHNL_RV_PACING_THRESHOLD_PULSEWIDTH: 0.4 MS
MDC_IDC_MSMT_LEADCHNL_RV_SENSING_INTR_AMPL: 25 MV
MDC_IDC_PG_IMPLANT_DTM: NORMAL
MDC_IDC_PG_MFG: NORMAL
MDC_IDC_PG_MODEL: NORMAL
MDC_IDC_PG_SERIAL: NORMAL
MDC_IDC_PG_TYPE: NORMAL
MDC_IDC_SESS_CLINIC_NAME: NORMAL
MDC_IDC_SESS_DTM: NORMAL
MDC_IDC_SESS_TYPE: NORMAL
MDC_IDC_SET_BRADY_HYSTRATE: NORMAL
MDC_IDC_SET_BRADY_LOWRATE: 40 {BEATS}/MIN
MDC_IDC_SET_BRADY_MODE: NORMAL
MDC_IDC_SET_LEADCHNL_RV_PACING_AMPLITUDE: 3.5 V
MDC_IDC_SET_LEADCHNL_RV_PACING_ANODE_ELECTRODE_1: NORMAL
MDC_IDC_SET_LEADCHNL_RV_PACING_ANODE_LOCATION_1: NORMAL
MDC_IDC_SET_LEADCHNL_RV_PACING_CAPTURE_MODE: NORMAL
MDC_IDC_SET_LEADCHNL_RV_PACING_CATHODE_ELECTRODE_1: NORMAL
MDC_IDC_SET_LEADCHNL_RV_PACING_CATHODE_LOCATION_1: NORMAL
MDC_IDC_SET_LEADCHNL_RV_PACING_POLARITY: NORMAL
MDC_IDC_SET_LEADCHNL_RV_PACING_PULSEWIDTH: 0.4 MS
MDC_IDC_SET_LEADCHNL_RV_SENSING_ADAPTATION_MODE: NORMAL
MDC_IDC_SET_LEADCHNL_RV_SENSING_ANODE_ELECTRODE_1: NORMAL
MDC_IDC_SET_LEADCHNL_RV_SENSING_ANODE_LOCATION_1: NORMAL
MDC_IDC_SET_LEADCHNL_RV_SENSING_CATHODE_ELECTRODE_1: NORMAL
MDC_IDC_SET_LEADCHNL_RV_SENSING_CATHODE_LOCATION_1: NORMAL
MDC_IDC_SET_LEADCHNL_RV_SENSING_POLARITY: NORMAL
MDC_IDC_SET_LEADCHNL_RV_SENSING_SENSITIVITY: 0.6 MV
MDC_IDC_SET_ZONE_DETECTION_INTERVAL: 300 MS
MDC_IDC_SET_ZONE_DETECTION_INTERVAL: 353 MS
MDC_IDC_SET_ZONE_TYPE: NORMAL
MDC_IDC_SET_ZONE_VENDOR_TYPE: NORMAL
MDC_IDC_STAT_BRADY_RV_PERCENT_PACED: 1 %
MDC_IDC_STAT_EPISODE_RECENT_COUNT: 3
MDC_IDC_STAT_EPISODE_RECENT_COUNT: 4
MDC_IDC_STAT_EPISODE_RECENT_COUNT: 4
MDC_IDC_STAT_EPISODE_RECENT_COUNT_DTM_END: NORMAL
MDC_IDC_STAT_EPISODE_RECENT_COUNT_DTM_START: NORMAL
MDC_IDC_STAT_EPISODE_TOTAL_COUNT: 3
MDC_IDC_STAT_EPISODE_TOTAL_COUNT: 9
MDC_IDC_STAT_EPISODE_TOTAL_COUNT: 9
MDC_IDC_STAT_EPISODE_TOTAL_COUNT_DTM_END: NORMAL
MDC_IDC_STAT_EPISODE_TYPE: NORMAL
MDC_IDC_STAT_EPISODE_VENDOR_TYPE: NORMAL
MDC_IDC_STAT_TACHYTHERAPY_ATP_DELIVERED_RECENT: 0
MDC_IDC_STAT_TACHYTHERAPY_ATP_DELIVERED_TOTAL: 0
MDC_IDC_STAT_TACHYTHERAPY_RECENT_DTM_END: NORMAL
MDC_IDC_STAT_TACHYTHERAPY_RECENT_DTM_START: NORMAL
MDC_IDC_STAT_TACHYTHERAPY_SHOCKS_ABORTED_RECENT: 0
MDC_IDC_STAT_TACHYTHERAPY_SHOCKS_ABORTED_TOTAL: 0
MDC_IDC_STAT_TACHYTHERAPY_SHOCKS_DELIVERED_RECENT: 0
MDC_IDC_STAT_TACHYTHERAPY_SHOCKS_DELIVERED_TOTAL: 0
MDC_IDC_STAT_TACHYTHERAPY_TOTAL_DTM_END: NORMAL

## 2019-07-16 ENCOUNTER — MYC MEDICAL ADVICE (OUTPATIENT)
Dept: CARDIOLOGY | Facility: CLINIC | Age: 54
End: 2019-07-16

## 2019-08-23 ENCOUNTER — OFFICE VISIT (OUTPATIENT)
Dept: URGENT CARE | Facility: URGENT CARE | Age: 54
End: 2019-08-23
Payer: COMMERCIAL

## 2019-08-23 VITALS
SYSTOLIC BLOOD PRESSURE: 102 MMHG | BODY MASS INDEX: 25.11 KG/M2 | HEART RATE: 50 BPM | HEIGHT: 67 IN | DIASTOLIC BLOOD PRESSURE: 64 MMHG | WEIGHT: 160 LBS

## 2019-08-23 DIAGNOSIS — M54.41 ACUTE RIGHT-SIDED LOW BACK PAIN WITH RIGHT-SIDED SCIATICA: Primary | ICD-10-CM

## 2019-08-23 PROCEDURE — 99213 OFFICE O/P EST LOW 20 MIN: CPT | Performed by: FAMILY MEDICINE

## 2019-08-23 RX ORDER — OXYCODONE AND ACETAMINOPHEN 5; 325 MG/1; MG/1
1 TABLET ORAL EVERY 6 HOURS PRN
Qty: 20 TABLET | Refills: 0 | Status: SHIPPED | OUTPATIENT
Start: 2019-08-23 | End: 2020-01-30

## 2019-08-23 ASSESSMENT — MIFFLIN-ST. JEOR: SCORE: 1534.39

## 2019-08-24 NOTE — PROGRESS NOTES
Subjective: Patient reports that he has had 10 or 12 episodes of severe right-sided sciatica, see notes from 2017.  He had to do a long trip to the [place] about 5 days ago in a jeep and it really set it off, pain goes down to his foot on the right side, but difficult to get comfortable, afraid to take steroids because of his heart failure, in the past his use narcotics until the pain subsides.  He says he has not done physical therapy or chiropractic although there was discussion of that in 2017.    Objective: He appears to be in pain.  Difficult to move right leg.  Impossible really to check DTRs or muscle strength.    Assessment and plan: Severe right sided sciatica.  I am not terribly happy about the only option that seems to be available but I did agree to give him 20 Percocet and follow-up with his regular doctor.

## 2019-08-26 ENCOUNTER — TELEPHONE (OUTPATIENT)
Dept: CARDIOLOGY | Facility: CLINIC | Age: 54
End: 2019-08-26

## 2019-08-26 DIAGNOSIS — N52.9 ED (ERECTILE DYSFUNCTION): ICD-10-CM

## 2019-08-26 RX ORDER — TADALAFIL 10 MG/1
TABLET ORAL
Qty: 20 TABLET | Refills: 1 | Status: SHIPPED | OUTPATIENT
Start: 2019-08-26 | End: 2019-08-28 | Stop reason: ALTCHOICE

## 2019-08-26 NOTE — TELEPHONE ENCOUNTER
Health Call Center    Phone Message    May a detailed message be left on voicemail: no    Reason for Call: Medication Refill Request    Has the patient contacted the pharmacy for the refill? Yes   Name of medication being requested: Sildenafil 50 MG Tablets  Provider who prescribed the medication: Dr. Aguiar  Pharmacy: [facility]  Date medication is needed: ASAP/TODAY, Pt will be out on 8/28/19, will be going on vacation 8/27/19, for a month. Pt wants a call back.      Action Taken: Message routed to:  [facility]OLOGY [facility]ULT [facility]

## 2019-08-26 NOTE — TELEPHONE ENCOUNTER
Signed Prescriptions:                        Disp   Refills    tadalafil (CIALIS) 10 MG tablet            20 tab*1        Sig: Take 5 MG prn  Authorizing Provider: RICARDO BELTRÁN  Ordering User: LESLIE LARIOS    Patient informed.    Leslie Larios RN

## 2019-08-27 NOTE — TELEPHONE ENCOUNTER
Health Call Center    Phone Message    May a detailed message be left on voicemail: yes    Reason for Call: Medication Question or concern regarding medication   Prescription Clarification  Name of Medication: Sildenafil 50 MG Tablets  Prescribing Provider: Dr. Aguiar   Pharmacy: [facility]   What on the order needs clarification? The wrong medication was sent to the the wrong pharmacy and the pt needs this medication today. A script of Cialis was sent to [facility] and the pt needs script  Of Sildenafil 50 MG Tablets sent to the [facility]. Please call pt back asap          Action Taken: Message routed to:  [facility] cardio

## 2019-08-28 DIAGNOSIS — N52.9 ED (ERECTILE DYSFUNCTION): ICD-10-CM

## 2019-08-28 RX ORDER — SILDENAFIL 50 MG/1
50 TABLET, FILM COATED ORAL DAILY PRN
Qty: 10 TABLET | Refills: 0 | Status: SHIPPED | OUTPATIENT
Start: 2019-08-28 | End: 2019-10-02

## 2019-08-28 NOTE — TELEPHONE ENCOUNTER
Rx filled for 10 tablets only.  Need to follow up with primary for future refills.  Leslie Larios RN

## 2019-08-28 NOTE — TELEPHONE ENCOUNTER
Viagra sent  per patient request on 7/3/19. Patient stated the price was too high and requested to change to Cialis. Cialis prescription sent on 7/3/19. Patient sent message on 8/26/19 requesting Viagra prescription to be sent to [facility]. Spoke with Dr. Aguiar and it is ok to send new Viagra prescription to [facility], number 10 with no refills. Patient is to get further refills from primary physician. Prescription sent and patient notified.

## 2019-08-28 NOTE — TELEPHONE ENCOUNTER
[facility]    Phone Message    May a detailed message be left on voicemail: yes    Reason for Call: Medication Refill Request    Has the patient contacted the pharmacy for the refill? Yes   Name of medication being requested: Sildenafil  Provider who prescribed the medication: Dr. Aguiar  Pharmacy: [facility]  Date medication is needed: as soon as possible, pt will be leaving town today for vacation   He states he tried the Cialis, and that made him really itch.        Action Taken: Message routed to:  Other: [facility]

## 2019-09-17 ENCOUNTER — OFFICE VISIT (OUTPATIENT)
Dept: URGENT CARE | Facility: URGENT CARE | Age: 54
End: 2019-09-17
Payer: COMMERCIAL

## 2019-09-17 VITALS
HEART RATE: 88 BPM | SYSTOLIC BLOOD PRESSURE: 110 MMHG | OXYGEN SATURATION: 98 % | WEIGHT: 171 LBS | BODY MASS INDEX: 26.78 KG/M2 | TEMPERATURE: 98.7 F | DIASTOLIC BLOOD PRESSURE: 74 MMHG

## 2019-09-17 DIAGNOSIS — M54.41 ACUTE RIGHT-SIDED LOW BACK PAIN WITH RIGHT-SIDED SCIATICA: Primary | ICD-10-CM

## 2019-09-17 DIAGNOSIS — M54.41 ACUTE RIGHT-SIDED LOW BACK PAIN WITH RIGHT-SIDED SCIATICA: ICD-10-CM

## 2019-09-17 PROCEDURE — 99214 OFFICE O/P EST MOD 30 MIN: CPT | Performed by: FAMILY MEDICINE

## 2019-09-17 RX ORDER — OXYCODONE AND ACETAMINOPHEN 5; 325 MG/1; MG/1
1 TABLET ORAL EVERY 6 HOURS PRN
Qty: 20 TABLET | Refills: 0 | Status: SHIPPED | OUTPATIENT
Start: 2019-09-17 | End: 2019-10-15

## 2019-09-17 NOTE — TELEPHONE ENCOUNTER
Reason for Call:  Medication or medication refill:    Do you use a [facility]?  Name of the pharmacy and phone number for the current request:  [facility] - [phone]    Name of the medication requested: oxycodone    Other request: patient is asking if Denise Christopher would fill the pain medication for this one time for him Dr Tapia he say last month for his pain and he was the one that prescribed this medication     Can we leave a detailed message on this number? YES    Phone number patient can be reached at: Home number on file [phone] (home)    Best Time: any    Call taken on 9/17/2019 at 10:44 AM by Cheryl Thomas

## 2019-09-17 NOTE — TELEPHONE ENCOUNTER
Pt called back checking to see if he can get a refill on his medication he is saying he is in pain please advise thank you

## 2019-09-18 RX ORDER — OXYCODONE AND ACETAMINOPHEN 5; 325 MG/1; MG/1
1 TABLET ORAL EVERY 6 HOURS PRN
Qty: 20 TABLET | Refills: 0 | Status: CANCELLED | OUTPATIENT
Start: 2019-09-18

## 2019-09-25 ENCOUNTER — OFFICE VISIT (OUTPATIENT)
Dept: URGENT CARE | Facility: URGENT CARE | Age: 54
End: 2019-09-25
Payer: COMMERCIAL

## 2019-09-25 ENCOUNTER — ANCILLARY PROCEDURE (OUTPATIENT)
Dept: GENERAL RADIOLOGY | Facility: CLINIC | Age: 54
End: 2019-09-25
Attending: INTERNAL MEDICINE
Payer: COMMERCIAL

## 2019-09-25 VITALS
OXYGEN SATURATION: 97 % | BODY MASS INDEX: 26.78 KG/M2 | HEART RATE: 91 BPM | TEMPERATURE: 98.6 F | DIASTOLIC BLOOD PRESSURE: 67 MMHG | WEIGHT: 171 LBS | SYSTOLIC BLOOD PRESSURE: 101 MMHG

## 2019-09-25 DIAGNOSIS — N52.9 ED (ERECTILE DYSFUNCTION): ICD-10-CM

## 2019-09-25 DIAGNOSIS — M25.551 HIP PAIN, RIGHT: Primary | ICD-10-CM

## 2019-09-25 DIAGNOSIS — M25.551 HIP PAIN, RIGHT: ICD-10-CM

## 2019-09-25 PROCEDURE — 99213 OFFICE O/P EST LOW 20 MIN: CPT | Performed by: INTERNAL MEDICINE

## 2019-09-25 PROCEDURE — 73502 X-RAY EXAM HIP UNI 2-3 VIEWS: CPT

## 2019-09-25 RX ORDER — HYDROCODONE BITARTRATE AND ACETAMINOPHEN 5; 325 MG/1; MG/1
1 TABLET ORAL EVERY 4 HOURS PRN
Qty: 18 TABLET | Refills: 0 | Status: SHIPPED | OUTPATIENT
Start: 2019-09-25 | End: 2020-01-30

## 2019-09-27 ENCOUNTER — TELEPHONE (OUTPATIENT)
Dept: CARDIOLOGY | Facility: CLINIC | Age: 54
End: 2019-09-27

## 2019-09-27 NOTE — TELEPHONE ENCOUNTER
Connected with Cheryl.  Started letter will complete on Monday and have Lucina Marcus sign.  Cheryl agreeable to plan. Rebecca Serrano RN

## 2019-09-27 NOTE — TELEPHONE ENCOUNTER
sildenafil (VIAGRA) 50 MG tablet      Last Written Prescription Date:  7/3/19  Last Fill Quantity: 10,   # refills: 0  Last Office Visit : 7/3/19  Future Office visit:  10/20/19    Routing refill request to provider for review/approval because:  Drug not on the [facility], [facility] or [facility] refill protocol or controlled substance

## 2019-09-27 NOTE — TELEPHONE ENCOUNTER
[facility]    Phone Message    May a detailed message be left on voicemail: yes    Reason for Call: Form or Letter   Type or form/letter needing completion: Medical Certificate (for mortgage)  Provider: Dr. Aguiar  Date form needed: asap  Please call spouse back      Action Taken: Message routed to:  [facility]

## 2019-09-29 ENCOUNTER — HEALTH MAINTENANCE LETTER (OUTPATIENT)
Age: 54
End: 2019-09-29

## 2019-09-30 DIAGNOSIS — I50.23 ACUTE ON CHRONIC SYSTOLIC CONGESTIVE HEART FAILURE (H): ICD-10-CM

## 2019-09-30 DIAGNOSIS — R06.09 DOE (DYSPNEA ON EXERTION): ICD-10-CM

## 2019-09-30 RX ORDER — SILDENAFIL 50 MG/1
50 TABLET, FILM COATED ORAL DAILY PRN
Qty: 10 TABLET | Refills: 0 | OUTPATIENT
Start: 2019-09-30

## 2019-09-30 NOTE — TELEPHONE ENCOUNTER
"Letter entered into \"Letters\" in Epic.  Called and spoke with Akshat, obtained verbal consent to email letter to email address on file.  Will also mail letter. Rebecca Serrano RN   "

## 2019-10-01 NOTE — TELEPHONE ENCOUNTER
"Requested Prescriptions   Pending Prescriptions Disp Refills     VENTOLIN  (90 Base) MCG/ACT inhaler [Pharmacy Med Name: VENTOLIN   HFA 108MCG/ACT AERS]  Last Written Prescription Date:  6-12-19  Last Fill Quantity: 1inh,  # refills: 0   Last office visit: 11/28/2018 with prescribing provider:  Denise White    Future Office Visit:    18 g 2     Sig: INHALE 2 PUFFS INTO THE LUNGS EVERY 4 HOURS AS NEEDED FOR SHORTNESS OF BREATH, DIFFICULTY BREATHING OR WHEEZING.       Asthma Maintenance Inhalers - Anticholinergics Passed - 9/30/2019  5:28 PM        Passed - Patient is age 12 years or older        Passed - Recent (12 mo) or future (30 days) visit within the authorizing provider's specialty     Patient has had an office visit with the authorizing provider or a provider within the authorizing providers department within the previous 12 mos or has a future within next 30 days. See \"Patient Info\" tab in inbasket, or \"Choose Columns\" in Meds & Orders section of the refill encounter.          Passed - Medication is active on med list         "

## 2019-10-02 ENCOUNTER — OFFICE VISIT (OUTPATIENT)
Dept: FAMILY MEDICINE | Facility: CLINIC | Age: 54
End: 2019-10-02
Payer: COMMERCIAL

## 2019-10-02 VITALS
HEIGHT: 67 IN | HEART RATE: 82 BPM | OXYGEN SATURATION: 95 % | TEMPERATURE: 99.3 F | BODY MASS INDEX: 27.94 KG/M2 | WEIGHT: 178 LBS | SYSTOLIC BLOOD PRESSURE: 106 MMHG | RESPIRATION RATE: 16 BRPM | DIASTOLIC BLOOD PRESSURE: 72 MMHG

## 2019-10-02 DIAGNOSIS — R06.09 DOE (DYSPNEA ON EXERTION): ICD-10-CM

## 2019-10-02 DIAGNOSIS — N52.9 ERECTILE DYSFUNCTION, UNSPECIFIED ERECTILE DYSFUNCTION TYPE: Primary | ICD-10-CM

## 2019-10-02 DIAGNOSIS — I50.23 ACUTE ON CHRONIC SYSTOLIC CONGESTIVE HEART FAILURE (H): ICD-10-CM

## 2019-10-02 PROCEDURE — 99213 OFFICE O/P EST LOW 20 MIN: CPT | Performed by: NURSE PRACTITIONER

## 2019-10-02 RX ORDER — SILDENAFIL 50 MG/1
50 TABLET, FILM COATED ORAL DAILY PRN
Qty: 20 TABLET | Refills: 5 | Status: SHIPPED | OUTPATIENT
Start: 2019-10-02 | End: 2020-01-30

## 2019-10-02 RX ORDER — SILDENAFIL 50 MG/1
50 TABLET, FILM COATED ORAL DAILY PRN
Qty: 20 TABLET | Refills: 5 | Status: SHIPPED | OUTPATIENT
Start: 2019-10-02 | End: 2019-10-02

## 2019-10-02 RX ORDER — ALBUTEROL SULFATE 90 UG/1
2 AEROSOL, METERED RESPIRATORY (INHALATION) EVERY 4 HOURS PRN
Qty: 1 INHALER | Refills: 0 | Status: SHIPPED | OUTPATIENT
Start: 2019-10-02 | End: 2019-12-10

## 2019-10-02 ASSESSMENT — MIFFLIN-ST. JEOR: SCORE: 1616.03

## 2019-10-02 NOTE — PROGRESS NOTES
Chief Complaint   Patient presents with     Shortness of Breath     Erectile Dysfunction     Subjective     Akshat Fragoso is a 54 year old male who presents to clinic today for the following health issues:  Chief Complaint   Patient presents with     Shortness of Breath     Erectile Dysfunction       HPI   Medication Followup     Taking Medication as prescribed: yes    Side Effects:  None    Medication Helping Symptoms:  yes     Shortness of breath:   has been chronic/related to his heart.  He does get the respiratory component and will use his albuterol inhaler 4-5 times a week.  When he uses his inhaler, his symptoms are improved.    He is in the clinic today requesting refills of his albuterol inhaler.    Viagra:  He has been using Viagra intermittently for several years.    He will typically take 50 mg daily as needed.  He denies side effects or problems with the medication.  He is requesting refills today.    Patient Active Problem List   Diagnosis     CHF (congestive heart failure) (H)     Cardiomyopathy, nonischemic (H)     Pulmonary nodules, next CT due 2018     Personal history of tobacco use, presenting hazards to health     ICD (implantable cardioverter-defibrillator) in place- [employer], single chamber- NOT dependent     Chronic systolic heart failure (H)     JOHNSON (dyspnea on exertion)     Acute on chronic systolic congestive heart failure (H)     Hypertrophic nonobstructive cardiomyopathy (H)     Other ill-defined heart diseases     Erectile dysfunction, unspecified erectile dysfunction type     Past Surgical History:   Procedure Laterality Date     CARDIAC SURGERY  2016    defibrillator placement     None         Social History     Tobacco Use     Smoking status: Light Tobacco Smoker     Packs/day: 0.50     Years: 15.00     Pack years: 7.50     Types: Cigarettes     Last attempt to quit: 2016     Years since quittin.8     Smokeless tobacco: Former User     Quit date: 10/24/2011    Substance Use Topics     Alcohol use: No     Alcohol/week: 0.0 standard drinks     History reviewed. No pertinent family history.      Current Outpatient Medications   Medication Sig Dispense Refill     albuterol (PROAIR HFA/PROVENTIL HFA/VENTOLIN HFA) 108 (90 Base) MCG/ACT inhaler Inhale 2 puffs into the lungs every 4 hours as needed for shortness of breath / dyspnea or wheezing 1 Inhaler 0     aspirin 81 MG tablet Take 81 mg by mouth daily       Blood Pressure Monitor KIT 1 kit daily 1 kit 0     carvedilol (COREG) 12.5 MG tablet Take 1 tablet (12.5 mg) by mouth 2 times daily (with meals) 180 tablet 3     furosemide (LASIX) 20 MG tablet Take 1 tablet (20 mg) by mouth 2 times daily Only take if weight up by 3lbs 180 tablet 3     lisinopril (PRINIVIL/ZESTRIL) 5 MG tablet Take 1 tablet (5 mg) by mouth 2 times daily 180 tablet 3     sacubitril-valsartan (ENTRESTO) 24-26 MG per tablet Take 1 tablet by mouth 2 times daily 180 tablet 3     sildenafil (VIAGRA) 50 MG tablet Take 1 tablet (50 mg) by mouth daily as needed (For ED) 20 tablet 5     spironolactone (ALDACTONE) 25 MG tablet Take 0.5 tablets (12.5 mg) by mouth daily 45 tablet 3     Allergies   Allergen Reactions     No Known Allergies      Recent Labs   Lab Test 05/07/19  1549 12/04/18  0926 11/19/18  0746  01/10/17  0718  01/09/17  1001   A1C  --  5.5  --   --   --   --   --    LDL  --   --  70  --  64  --   --    HDL  --   --  38*  --  39*  --   --    TRIG  --   --  282*  --  103  --   --    ALT  --   --  25  --   --   --  111*   CR 0.94  --  1.35*   < > 0.90   < > 0.85   GFRESTIMATED >90  --  56*   < > 90   < > >90  Non  GFR Calc     GFRESTBLACK >90  --  67   < > >90   GFR Calc     < > >90   GFR Calc     POTASSIUM 4.4  --  4.0   < > 4.4  --  4.9   TSH  --   --  1.29  --   --   --  1.34    < > = values in this interval not displayed.      BP Readings from Last 3 Encounters:   10/02/19 106/72   09/25/19 101/67  "  09/17/19 110/74    Wt Readings from Last 3 Encounters:   10/02/19 80.7 kg (178 lb)   09/25/19 77.6 kg (171 lb)   09/17/19 77.6 kg (171 lb)               Reviewed and updated as needed this visit by Provider  Tobacco  Allergies  Meds  Problems  Med Hx  Surg Hx  Fam Hx         Review of Systems   ROS COMP: Constitutional, HEENT, cardiovascular, pulmonary, gi and gu systems are negative, except as otherwise noted.      Objective    /72 (BP Location: Left arm, Patient Position: Sitting, Cuff Size: Adult Regular)   Pulse 82   Temp 99.3  F (37.4  C) (Oral)   Resp 16   Ht 1.702 m (5' 7\")   Wt 80.7 kg (178 lb)   SpO2 95%   BMI 27.88 kg/m    Body mass index is 27.88 kg/m .  Physical Exam   GENERAL APPEARANCE: healthy, alert and no distress. Smiling.   SKIN: warm and dry  PSYCH: mentation appears normal and affect normal/bright.  Good eye contact.      Diagnostic Test Results:  Labs reviewed in Epic        Assessment & Plan     (N52.9) Erectile dysfunction, unspecified erectile dysfunction type  (primary encounter diagnosis)  Comment: chronic  Plan: sildenafil (VIAGRA) 50 MG tablet, DISCONTINUED:        sildenafil (VIAGRA) 50 MG tablet        Refills were given today.  I encouraged him to take the medication only when needed/as prescribed.  Clinic appts with me for refills every 6 months, sooner prn.     (R06.09) JOHNSON (dyspnea on exertion)  Comment: chronic  Plan: albuterol (PROAIR HFA/PROVENTIL HFA/VENTOLIN         HFA) 108 (90 Base) MCG/ACT inhaler        I did go ahead and refill his albuterol today.  He will continue to use the medication as prescribed/prn.  He is to return to the clinic to see me any time if his JOHNSON escalates or if his wheezing is not improved with albuterol.    (I50.23) Acute on chronic systolic congestive heart failure (H)  Comment:   Plan: albuterol (PROAIR HFA/PROVENTIL HFA/VENTOLIN         HFA) 108 (90 Base) MCG/ACT inhaler        Continue care with cardiology.       Tobacco " "Cessation:   reports that he has been smoking cigarettes. He has a 7.50 pack-year smoking history. He quit smokeless tobacco use about 7 years ago.  Tobacco Cessation Action Plan: Information offered: Patient not interested at this time      BMI:   Estimated body mass index is 27.88 kg/m  as calculated from the following:    Height as of this encounter: 1.702 m (5' 7\").    Weight as of this encounter: 80.7 kg (178 lb).   Weight management plan: Discussed healthy diet and exercise guidelines        Return in about 6 months (around 4/2/2020) for Medication follow up.    VERONICA Doran [facility]        "

## 2019-10-03 ENCOUNTER — TELEPHONE (OUTPATIENT)
Dept: FAMILY MEDICINE | Facility: CLINIC | Age: 54
End: 2019-10-03

## 2019-10-03 RX ORDER — ALBUTEROL SULFATE 90 UG/1
AEROSOL, METERED RESPIRATORY (INHALATION)
Qty: 18 G | Refills: 2 | Status: SHIPPED | OUTPATIENT
Start: 2019-10-03 | End: 2020-01-30

## 2019-10-03 NOTE — TELEPHONE ENCOUNTER
Prior Authorization Retail Medication Request    Medication/Dose: sildenafil (VIAGRA) 50 MG tablet  ICD code (if different than what is on RX):  Previously Tried and Failed:  Rationale:    Insurance Name:    Insurance ID: [id number]    Pharmacy Information (if different than what is on RX)  Name:  Phone:    Please include previous medications tried and failed.  Please ask insurance for medications on formulary.

## 2019-10-03 NOTE — TELEPHONE ENCOUNTER
Prescription approved per [facility] Refill Protocol.  Eli Mcnally RN

## 2019-10-08 NOTE — TELEPHONE ENCOUNTER
Central Prior Authorization Team   Phone: [phone]    PA Initiation    Medication: sildenafil (VIAGRA) 50 MG tablet, rec 10-3-19  Insurance Company: [employer] - Phone [phone] Fax [phone]  Pharmacy Filling the Rx: [facility] [address]  Filling Pharmacy Phone: [phone]  Filling Pharmacy Fax: [phone]  Start Date: 10/8/2019

## 2019-10-09 NOTE — TELEPHONE ENCOUNTER
PRIOR AUTHORIZATION DENIED    Medication: sildenafil (VIAGRA) 50 MG-DENIED    Denial Date: 10/9/2019    Denial Rational: MEDICATION IS EXCLUDED FROM COVERAGE.  NO ALTERNATIVES GIVEN FOR EXCLUDED MEDICATIONS.        Appeal Information:  IF YOU WOULD LIKE TO APPEAL  PLEASE SUPPLY PA TEAM WITH A LETTER OF MEDICAL NECESSITY WITH CLINICAL REASON.

## 2019-10-10 NOTE — TELEPHONE ENCOUNTER
Please call Akshat.    At the time of his appointment he knew this medication had a high out-of-pocket cost.  Let him know that his insurance is not going to cover this and there are no alternatives that they will cover as well.

## 2019-10-10 NOTE — TELEPHONE ENCOUNTER
PA was denied. Please order alternative med with complete SIG or begin appeal process.     If you would like to appeal:   Create letter of medical necessity or    Compile supporting clinical documentation in EPIC Telephone encounter (TE).    Route TE to: [facility].    PRIOR AUTHORIZATION DENIED     Medication: sildenafil (VIAGRA) 50 MG-DENIED     Denial Date: 10/9/2019     Denial Rational: MEDICATION IS EXCLUDED FROM COVERAGE.  NO ALTERNATIVES GIVEN FOR EXCLUDED MEDICATIONS.

## 2019-10-14 NOTE — TELEPHONE ENCOUNTER
LVM that insurance will not cover the Viagra or any other alternative so he will have to pay out of pocket for this medication.  If he has any questions he can call the clinic.Luz Fermin, NA/R

## 2019-10-15 ENCOUNTER — OFFICE VISIT (OUTPATIENT)
Dept: FAMILY MEDICINE | Facility: CLINIC | Age: 54
End: 2019-10-15
Payer: COMMERCIAL

## 2019-10-15 ENCOUNTER — TELEPHONE (OUTPATIENT)
Dept: PALLIATIVE MEDICINE | Facility: CLINIC | Age: 54
End: 2019-10-15

## 2019-10-15 VITALS
RESPIRATION RATE: 16 BRPM | TEMPERATURE: 98 F | SYSTOLIC BLOOD PRESSURE: 122 MMHG | WEIGHT: 176 LBS | HEIGHT: 67 IN | HEART RATE: 83 BPM | BODY MASS INDEX: 27.62 KG/M2 | OXYGEN SATURATION: 99 % | DIASTOLIC BLOOD PRESSURE: 78 MMHG

## 2019-10-15 DIAGNOSIS — Z23 NEED FOR PROPHYLACTIC VACCINATION AND INOCULATION AGAINST INFLUENZA: ICD-10-CM

## 2019-10-15 DIAGNOSIS — M54.41 ACUTE RIGHT-SIDED LOW BACK PAIN WITH RIGHT-SIDED SCIATICA: ICD-10-CM

## 2019-10-15 DIAGNOSIS — M25.551 ACUTE RIGHT HIP PAIN: Primary | ICD-10-CM

## 2019-10-15 PROCEDURE — 90682 RIV4 VACC RECOMBINANT DNA IM: CPT | Performed by: NURSE PRACTITIONER

## 2019-10-15 PROCEDURE — 99214 OFFICE O/P EST MOD 30 MIN: CPT | Mod: 25 | Performed by: NURSE PRACTITIONER

## 2019-10-15 PROCEDURE — 90471 IMMUNIZATION ADMIN: CPT | Performed by: NURSE PRACTITIONER

## 2019-10-15 RX ORDER — OXYCODONE AND ACETAMINOPHEN 5; 325 MG/1; MG/1
1 TABLET ORAL EVERY 6 HOURS PRN
Qty: 28 TABLET | Refills: 0 | Status: SHIPPED | OUTPATIENT
Start: 2019-10-15 | End: 2019-10-22

## 2019-10-15 ASSESSMENT — MIFFLIN-ST. JEOR: SCORE: 1606.96

## 2019-10-15 NOTE — PROGRESS NOTES
"Subjective     Akshat Fragoso is a 54 year old male who presents to clinic today for the following health issues:  Chief Complaint   Patient presents with     Musculoskeletal Problem     slipped last thursday on the bathtub, right hip     Imm/Inj     Flu Shot           Traveling, leaving today returning 10/23/2019.      Fell 10/10/2019 at a friend's house while getting out of a hot tub.  He fell straight down on his right buttock cheek.  He has not been able to walk without difficulty,  But the pain is escalating.  He is now having problems with pain in his buttock and thigh.  Sitting is uncomfortable.  Walking is also uncomfortable, but not as bad as sitting.  He is unable to put weight on his right buttock.  No bruising.  No numbness or tingling distally.  Voiding/stooling okay.  He is requesting a few tablets of Percocet for now.    Ice/ibuprofen have not been helping.    Travel:  He is leaving today for a week via airplane to go visit his daughter who recently suffered the loss of a  child.  This is Akshat's granddaughter and the  is upcoming.    Reviewed and updated as needed this visit by Provider  Tobacco  Allergies  Meds  Problems  Med Hx  Surg Hx  Fam Hx         Review of Systems   ROS COMP: Constitutional, HEENT, cardiovascular, pulmonary, GI, , musculoskeletal, neuro, skin, endocrine and psych systems are negative, except as otherwise noted.      Objective    /78 (BP Location: Left arm, Patient Position: Sitting, Cuff Size: Adult Regular)   Pulse 83   Temp 98  F (36.7  C) (Oral)   Resp 16   Ht 1.702 m (5' 7\")   Wt 79.8 kg (176 lb)   SpO2 99%   BMI 27.57 kg/m    Body mass index is 27.57 kg/m .  Physical Exam   GENERAL APPEARANCE: healthy, alert. Uncomfortable. Sitting in the chair, leaning to his left with his right hip up.  MS: low back nontender to palpation. Some paravertebral muscle spasms noted to lumbar area.  He complains of pain with palpation to right mid " "buttock.  Range of motion of his right hip intact.  C WMS is intact distally to his right lower leg.  Bilateral lower extremity reflexes are strong.  SKIN: warm and dry  PSYCH: mentation appears normal and affect normal/bright.  Good eye contact.      Diagnostic Test Results:  Labs reviewed in Epic        Assessment & Plan     (M25.551) Acute right hip pain  (primary encounter diagnosis)  Comment: Acute  Plan: PAIN MANAGEMENT REFERRAL        I did agree to go ahead and give Akshat 20 tablets of Percocet.  He is to use these sparingly only as needed.  I did remind him that we have gone in circles in the past about  His request for narcotics, my concern about overuse and addiction etc.  For today, his pain does appear acute and Legitimate.  I did tell him that I would not be giving him any more tablets of the Percocet.  He is planning to follow-up with the pain management group for overall pain problems, low back pain, right hip and buttock etc.  He is appreciative the referral.      (M54.41) Acute right-sided low back pain with right-sided sciatica  Comment:   Plan: oxyCODONE-acetaminophen (PERCOCET) 5-325 MG         tablet, PAIN MANAGEMENT REFERRAL        As above    (Z23) Need for prophylactic vaccination and inoculation against influenza  Comment: Routine  Plan: INFLUENZA QUAD, RECOMBINANT, P-FREE (RIV4)         (FLUBLOCK) [31440], Vaccine Administration,         Initial [68114]        Done today       Tobacco Cessation:   reports that he has been smoking cigarettes. He has a 7.50 pack-year smoking history. He quit smokeless tobacco use about 7 years ago.  Tobacco Cessation Action Plan: Information offered: Patient not interested at this time      BMI:   Estimated body mass index is 27.57 kg/m  as calculated from the following:    Height as of this encounter: 1.702 m (5' 7\").    Weight as of this encounter: 79.8 kg (176 lb).   Weight management plan: Discussed healthy diet and exercise guidelines    Return in about " 4 weeks (around 11/12/2019) for Follow up regarding today's concerns.    VERONICA Doran [facility]

## 2019-10-15 NOTE — LETTER
October 28, 2019    Akshat Fragoso  [address]  [facility] [address]    Dear Akshat,         Welcome to the [facility] at the [facility] [place]. We are located on the 6th floor, [address], of the [facility] located at [address], [place] [address]. For general parking, the [facility] [address] is the closest to our building.     Your appointment at the [facility] has been scheduled on Monday November 11, 2019 at 9:00a with Jimena Temple NP.    At your first visit, you will meet your team of caregivers who will help you to develop pain management strategies that will last a lifetime. You will meet with our support staff to validate parking at a reduced rate, review your insurance information, and collect your co-payment if required by your insurance company. You will also meet with a medical pain specialist and care coordinator who will assess your pain and develop a plan of care for your successful pain rehabilitation. You should expect to spend 1-2 hours at your first visit with us. Usually, patients work with us for a period of 6-12 months, and eventually return to their primary doctor once their pain management has stabilized.      To help us make your visit go as smoothly as possible, please bring the following items with you on your visit:   Completed Pain Questionnaire enclosed in this packet.  If you do not bring the completed questionnaire, we may have to reschedule your appointment.  List of any medicines that you are currently taking or have been prescribed  Important NON-Young medical information such as medical records or tests results (X-rays, or laboratory tests)  Your health insurance card  Financial resources to cover your co-payment or balance due at the time of service (cash, personal check, Visa, and MasterCard are acceptable methods of payment)     Due to the demand for new patient  evaluations, you must notify the scheduling department 48 hours in advance if you are not able to keep this appointment.  Failure to do so could affect your ability to reschedule with our clinic. Please do not assume that you will  receive any prescription medications at your first visit.    Please call [phone] with any questions regarding your appointment.  We look forward to meeting you and working to address your health care needs.       Sincerely,    [facility]

## 2019-10-22 ENCOUNTER — TELEPHONE (OUTPATIENT)
Dept: FAMILY MEDICINE | Facility: CLINIC | Age: 54
End: 2019-10-22

## 2019-10-22 DIAGNOSIS — M54.41 ACUTE RIGHT-SIDED LOW BACK PAIN WITH RIGHT-SIDED SCIATICA: ICD-10-CM

## 2019-10-22 RX ORDER — OXYCODONE AND ACETAMINOPHEN 5; 325 MG/1; MG/1
1 TABLET ORAL EVERY 6 HOURS PRN
Qty: 28 TABLET | Refills: 0 | Status: SHIPPED | OUTPATIENT
Start: 2019-10-22 | End: 2019-10-30

## 2019-10-22 NOTE — TELEPHONE ENCOUNTER
I approved one refill but I will not approve any more refills of this narcotic per our previous conversation.

## 2019-10-22 NOTE — TELEPHONE ENCOUNTER

## 2019-10-22 NOTE — TELEPHONE ENCOUNTER
Patient can not get into pain clinic till 10-30-19, will be out of oxycodone after today.  Can he get refill of it from [facility]?  Please call with answer or any questions.    [facility]

## 2019-10-24 NOTE — PROGRESS NOTES
[facility] Progress Note        Louise Rosa MD, MPH  9-        History:      A pleasant 54 year old year old male is seen for right hip pain. No recent fall or direct trauma is referred. No weakness or numbness of the right lower extremity is referred.He is able to ambulate although he reports right upper and outer hip pain.No hematuria,melena or hematochezia is referred.  No abdominal or pelvic pain is referred.No fever or chills are referred.         Assessment and Plan:            Hip pain, right  - HYDROcodone-acetaminophen (NORCO) 5-325 MG tablet; Take 1 tablet by mouth every 4 hours as needed for pain  Dispense: 18 tablet; Refill: 0   Discussed the sedative nature of this medication and advised the patient to avoid operation heavy equipment or machinery and caution w driving is advised.   Advised to avoid any strenuous physical activity until current symptoms are resolved.    Follow-up with your PCP in 4-5 days, earlier if symptoms worsen.                   Physical Exam:      /67   Pulse 91   Temp 98.6  F (37  C) (Oral)   Wt 77.6 kg (171 lb)   SpO2 97%   BMI 26.78 kg/m       Constitutional: Patient is in no distress The patient is pleasant and cooperative.   HEENT: Head:  Head is atraumatic, normocephalic.    Eyes: Pupils are equal, round and reactive to light and accomodation.  Sclera is non-icteric. No conjunctival injection, or exudate noted. Extraocular motion is intact. Visual acuity is intact bilaterally.  Ears:  External acoustic canals are patent and clear.  There is no erythema and bulging( exudate)  of the ( R/L ) tympanic membrane(s ).   Nose:  No Nasal congestion w/o drainage or mucosal ulceration is noted.  Throat:  Oral mucosa is moist.  No oral lesions are noted.  No posterior pharyngeal hyperemia or exudate noted.     Neck Supple.  There is no cervical lymphadenopathy.  No nuchal rigidity noted.  There is no meningismus.     Cardiovascular: Heart  is regular to rate and rhythm.  No murmur is noted.     Chest. Chest Symmetrical, no soft tissues, swelling, or tenderness upon palpation   Lungs: Clear in the anterior and posterior pulmonary fields.   Abdomen: Soft and non-tender. No pulsatile abdominal mass is noted. No abdominal distention.   Back No flank tenderness is noted.   Extremeties No edema, no calf tenderness. Moderate pain of the upper and outer right hip. No pain or swelling over right trochanter is noted however.   Neuro: No focal deficit.   Skin No petechiae or purpura is noted.  There is no rash.   Mood Normal              Medications:        PRN Meds:          Data:      All new lab and imaging data was reviewed.   Results for orders placed or performed in visit on 07/03/19   Cardiac Device Check - In Clinic   Result Value Ref Range    Date Time Interrogation Session [id number]     Implantable Pulse Generator  [employer]     Implantable Pulse Generator Model D150 [id number]     Implantable Pulse Generator Serial Number [id number]     Type Interrogation Session In Clinic     Clinic Name [facility]     Implantable Pulse Generator Type Defibrillator     Implantable Pulse Generator Implant Date 20170608     Implantable Lead  [employer]     Implantable Lead Model 0293 [place] 4-Site SG     Implantable Lead Serial Number [id number]     Implantable Lead Implant Date 20170608     Implantable Lead Polarity Type Bipolar Lead     Implantable Lead Location Detail 1 UNKNOWN     Implantable Lead Location Right Ventricle     Jesus Setting Mode (NBG Code) VVI     Jesus Setting Lower Rate Limit 40 [beats]/min    Jesus Setting Hysterisis Rate Off     Lead Channel Setting Sensing Polarity Bipolar     Lead Channel Setting Sensing Cathode Location Unknown     Lead Channel Setting Sensing Cathode Terminal Unknown     Lead Channel Setting Sensing Anode Location Unknown     Lead Channel Setting Sensing Anode  Terminal Unknown     Lead Channel Setting Sensing Sensitivity 0.6 mV    Lead Channel Setting Sensing Adaptation Mode Adaptive     Lead Channel Setting Pacing Polarity Bipolar     Lead Channel Setting Pacing Anode Location Unknown     Lead Channel Setting Pacing Anode Terminal Unknown     Lead Channel Setting Sensing Cathode Location Unknown     Lead Channel Setting Sensing Cathode Terminal Unknown     Lead Channel Setting Pacing Pulse Width 0.4 ms    Lead Channel Setting Pacing Amplitude 3.5 V    Lead Channel Setting Pacing Capture Mode Fixed Pacing     Zone Setting Type Category VF     Zone Setting Vendor Type Category VF     Zone Setting Detection Interval 300.0 ms    Zone Setting Type Category VT     Zone Setting Vendor Type Category VT     Zone Setting Detection Interval 353.0 ms    Zone Setting Type Category VT     Zone Setting Vendor Type Category VT-1     Lead Channel Impedance Value 491.0 ohm    Lead Channel Sensing Intrinsic Amplitude 25 mV    Lead Channel Pacing Threshold Amplitude 0.9000 V    Lead Channel Pacing Threshold Pulse Width 0.4 ms    Battery Date Time of Measurements [id number]     Battery Status Beginning of Service     Battery Remaining Longevity 144 mo    Capacitor Charge Type Reformation     Capacitor Last Charge Date Time 01319868530830     Capacitor Charge Time 9.771     Capacitor Charge Type Shock     Capacitor Charge Time 0 s    Capacitor Charge Energy 0 J    Jesus Statistic RV Percent Paced 1 %    Therapy Statistic Total Shocks Delivered 0     Therapy Statistic Total Shocks Aborted 0     Therapy Statistic Total ATP Delivered 0     Therapy Statistic Total  Date Time End 67909500771788     Therapy Statistic Recent Shocks Delivered 0     Therapy Statistic Recent Shocks Aborted 0     Therapy Statistic Recent ATP Delivered 0     Therapy Statistic Recent Date Time Start [id number]     Therapy Statistic Recent Date Time End [id number]     Episode Statistic Total Count 3     Episode  Statistic Type Category VT     Episode Statistic Vendor Type Category NSVT     Episode Statistic Total Count 9     Episode Statistic Type Category VF     Episode Statistic Vendor Type Category VF     Episode Statistic Total Count 9     Episode Statistic Type Category VF_VT_MONITOR     Episode Statistic Total Date Time End [id number]     Episode Statistic Total Date Time End 20190703050000     Episode Statistic Total Date Time End 20190703050000     Episode Statistic Recent Count 3     Episode Statistic Type Category VT     Episode Statistic Vendor Type Category NSVT     Episode Statistic Recent Count 4     Episode Statistic Type Category VF     Episode Statistic Vendor Type Category VF     Episode Statistic Recent Count 4     Episode Statistic Type Category VF_VT_MONITOR     Episode Statistic Recent Date Time Start 20170918111940     Episode Statistic Recent Date Time End [id number]     Episode Statistic Recent Date Time Start 20170918111940     Episode Statistic Recent Date Time End [id number]     Episode Statistic Recent Date Time Start [id number]     Episode Statistic Recent Date Time End [id number]     Narrative    Patient seen in [facility] for evaluation and iterative programming of a [employer] Single lead ICD per MD orders. Patient has an appointment to   see Dr. Aguiar today. Normal ICD function. 1 episode recorded as NSVT since   3/17/19. EGM for review shows a short burst @ 193 bpm, morphology appears   similar to intrinsic suggesting PSVT, but hard to distinguish due to the   single lead. Intrinsic rhythm = Regular VS @  87 bpm.  = <1%. Estimated   battery longevity to NEETA = 12 years. Lead trends appear stable. Patient   reports that he is feeling well, but reports using his rescue inhaler more   frequently due to SOB. Plan for patient to send a remote transmission in 3   months and return to clinic in 6 months. GISELA Bush RN, BSN. Single lead   ICDI have reviewed and interpreted  the device interrogation, settings,   programming and nurse's summary. The device is functioning within normal   device parameters. I agree with the current findings, assessment and plan.

## 2019-10-29 DIAGNOSIS — M54.41 ACUTE RIGHT-SIDED LOW BACK PAIN WITH RIGHT-SIDED SCIATICA: ICD-10-CM

## 2019-10-29 NOTE — TELEPHONE ENCOUNTER
Reason for Call:  Medication or medication refill:    Do you use a [facility]?  Name of the pharmacy and phone number for the current request:  NA    Name of the medication requested: oxyCODONE-acetaminophen (PERCOCET) 5-325 MG tablet    Other request: Pt states he will be out of the refill by the end of this week even after picking up the medication on 10/22. Pt states he has an appt on 11/11 with the pain clinic. Pt informed PCP stated that she was only going to refill one time until appt. He states that he needs to go out of town due to the death of his daughter and needs to  her remains and is hoping to get another refill.     Can we leave a detailed message on this number? YES    Phone number patient can be reached at: Home number on file [phone] (home)    Best Time: anytime    Call taken on 10/29/2019 at 10:37 AM by Jillian Wei

## 2019-10-30 RX ORDER — OXYCODONE AND ACETAMINOPHEN 5; 325 MG/1; MG/1
1 TABLET ORAL EVERY 6 HOURS PRN
Qty: 28 TABLET | Refills: 0 | Status: SHIPPED | OUTPATIENT
Start: 2019-10-30 | End: 2019-11-19

## 2019-10-30 NOTE — TELEPHONE ENCOUNTER
Patient calling to find out status of this medication.  Is leaving town in a couple hours and needs this before then.  Please call when done or if you have any questions or messages.  OK to LM on VM.

## 2019-10-30 NOTE — TELEPHONE ENCOUNTER
Requested Prescriptions   Pending Prescriptions Disp Refills     oxyCODONE-acetaminophen (PERCOCET) 5-325 MG tablet 28 tablet 0     Sig: Take 1 tablet by mouth every 6 hours as needed for severe pain       There is no refill protocol information for this order              Last Written Prescription Date:  10/22/19  Last Fill Quantity: 28,   # refills: 0  Last Office Visit: 10/15/19  Future Office visit:       Routing refill request to provider for review/approval because:  Drug not on the G, P or [facility] refill protocol or controlled substance

## 2019-10-30 NOTE — TELEPHONE ENCOUNTER
"Telephone call to the patient.    Akshat is on his way to [place] today.  A few weeks ago his  granddaughter .  The mother that baby, his daughter, had a history of anorexia nervosa and after the death of his her baby she \" drink herself to death.\"  Akshat is on his way to [place] today to finalize the release of his daughter's body so it can be cremated and she can be brought back to [place] for burial.  Akshat reports that his back pain  Has not improved.  He had to cancel his appointment with the pain management clinic today because of the  of his daughter.  He has an appointment set up with the pain clinic when he returns to [place].  He is requesting 1 more refill on his pain medications today to get him through to the appointment at the pain management clinic and he \"promises to never ask me for narcotics again.\"  The pain medication was refilled and sent to our pharmacy.  He will use it sparingly, only as needed I encouraged him to take care of himself and to keep that scheduled appointment the pain management group.  He agrees and understands..    "

## 2019-10-30 NOTE — TELEPHONE ENCOUNTER
The patient is requesting an expedited refill as he is leaving to go out of town today.  The patients granddaughter and daughter recently passed away.  The patient has an appointment with the pain clinic 11/11/19.

## 2019-11-18 DIAGNOSIS — M54.41 ACUTE RIGHT-SIDED LOW BACK PAIN WITH RIGHT-SIDED SCIATICA: ICD-10-CM

## 2019-11-18 NOTE — TELEPHONE ENCOUNTER
Reason for Call:  Medication or medication refill:    Do you use a [facility]?  Name of the pharmacy and phone number for the current request:  [facility] [place] - [place], [place] - [facility] [address]  [phone]    Name of the medication requested: oxyCODONE-acetaminophen (PERCOCET) 5-325 MG tablet    Other request: Patient is requesting a refill on this medication. States he is scheduled at the pain management clinic on 11/27/2019 but is hoping to get it refilled sooner. Please assist. Thanks!    Can we leave a detailed message on this number? YES    Phone number patient can be reached at: Home number on file [phone] (home)    Best Time: Any    Call taken on 11/18/2019 at 9:35 AM by Dedra Sy

## 2019-11-19 RX ORDER — OXYCODONE AND ACETAMINOPHEN 5; 325 MG/1; MG/1
1 TABLET ORAL 3 TIMES DAILY PRN
Qty: 24 TABLET | Refills: 0 | Status: SHIPPED | OUTPATIENT
Start: 2019-11-19 | End: 2020-01-30

## 2019-11-19 NOTE — TELEPHONE ENCOUNTER
Requested Prescriptions   Pending Prescriptions Disp Refills     oxyCODONE-acetaminophen (PERCOCET) 5-325 MG tablet 28 tablet 0     Sig: Take 1 tablet by mouth every 6 hours as needed for severe pain       There is no refill protocol information for this order              Last Written Prescription Date:  10/30/19  Last Fill Quantity: 28,   # refills: 0  Last Office Visit: 10/15/19  Future Office visit:       Routing refill request to provider for review/approval because:  Drug not on the G, P or [facility] refill protocol or controlled substance

## 2019-11-19 NOTE — TELEPHONE ENCOUNTER
"Telephone call to the patient.  Amita and I have gone around and around with c/o chronic pain, acute pain, refill requests, etc.  Amita and I reviewed his history, narcotic use, current pain and life situation (his daughter , he was in [place] cleaning out her house/property/selling horses/animals, etc).  He has an upcoming appointment with the pain clinic.  \"I just need one more week of the pain pills to get me to that appointment. I will not ask for more refills.\"  I told amita that I do not want to get in trouble with him--addiction, dependence, misuse, etc.  He states he is taking his medication because of the pain and will never ask me for another refill.  Rx granted.  He is to keep his scheduled appointment with the pain clinic.  If he cancels/no show, no more refills.        Appointment 10am 2019 at the Pain clinic.   "

## 2019-11-20 ENCOUNTER — APPOINTMENT (OUTPATIENT)
Dept: LAB | Facility: CLINIC | Age: 54
End: 2019-11-20
Attending: INTERNAL MEDICINE
Payer: COMMERCIAL

## 2019-11-20 ENCOUNTER — OFFICE VISIT (OUTPATIENT)
Dept: CARDIOLOGY | Facility: CLINIC | Age: 54
End: 2019-11-20
Attending: INTERNAL MEDICINE
Payer: COMMERCIAL

## 2019-11-20 ENCOUNTER — APPOINTMENT (OUTPATIENT)
Dept: MEDSURG UNIT | Facility: CLINIC | Age: 54
End: 2019-11-20
Attending: INTERNAL MEDICINE
Payer: COMMERCIAL

## 2019-11-20 ENCOUNTER — HOSPITAL ENCOUNTER (OUTPATIENT)
Dept: CARDIOLOGY | Facility: CLINIC | Age: 54
End: 2019-11-20
Attending: INTERNAL MEDICINE | Admitting: INTERNAL MEDICINE
Payer: COMMERCIAL

## 2019-11-20 ENCOUNTER — TELEPHONE (OUTPATIENT)
Dept: FAMILY MEDICINE | Facility: CLINIC | Age: 54
End: 2019-11-20

## 2019-11-20 ENCOUNTER — HOSPITAL ENCOUNTER (OUTPATIENT)
Facility: CLINIC | Age: 54
Discharge: HOME | End: 2019-11-20
Attending: INTERNAL MEDICINE | Admitting: INTERNAL MEDICINE
Payer: COMMERCIAL

## 2019-11-20 VITALS
BODY MASS INDEX: 26.68 KG/M2 | OXYGEN SATURATION: 99 % | WEIGHT: 170 LBS | HEART RATE: 80 BPM | DIASTOLIC BLOOD PRESSURE: 71 MMHG | HEIGHT: 67 IN | SYSTOLIC BLOOD PRESSURE: 112 MMHG

## 2019-11-20 VITALS
SYSTOLIC BLOOD PRESSURE: 116 MMHG | DIASTOLIC BLOOD PRESSURE: 73 MMHG | WEIGHT: 170 LBS | HEIGHT: 67 IN | BODY MASS INDEX: 26.68 KG/M2 | HEART RATE: 83 BPM | OXYGEN SATURATION: 94 %

## 2019-11-20 DIAGNOSIS — I42.9 CARDIOMYOPATHY (H): Primary | ICD-10-CM

## 2019-11-20 DIAGNOSIS — I51.89 OTHER ILL-DEFINED HEART DISEASES: ICD-10-CM

## 2019-11-20 DIAGNOSIS — I50.22 CHRONIC SYSTOLIC HEART FAILURE (H): ICD-10-CM

## 2019-11-20 DIAGNOSIS — R06.09 DOE (DYSPNEA ON EXERTION): ICD-10-CM

## 2019-11-20 DIAGNOSIS — I42.2 HYPERTROPHIC NONOBSTRUCTIVE CARDIOMYOPATHY (H): ICD-10-CM

## 2019-11-20 DIAGNOSIS — I50.20 HEART FAILURE WITH REDUCED EJECTION FRACTION, NYHA CLASS III (H): ICD-10-CM

## 2019-11-20 DIAGNOSIS — I50.23 ACUTE ON CHRONIC SYSTOLIC CONGESTIVE HEART FAILURE (H): ICD-10-CM

## 2019-11-20 DIAGNOSIS — Z59.9 FINANCIAL DIFFICULTIES: Primary | ICD-10-CM

## 2019-11-20 LAB
ALBUMIN SERPL-MCNC: 3.6 G/DL (ref 3.4–5)
ALP SERPL-CCNC: 68 U/L (ref 40–150)
ALT SERPL W P-5'-P-CCNC: 22 U/L (ref 0–70)
ANION GAP SERPL CALCULATED.3IONS-SCNC: 5 MMOL/L (ref 3–14)
AST SERPL W P-5'-P-CCNC: 16 U/L (ref 0–45)
BASOPHILS # BLD AUTO: 0 10E9/L (ref 0–0.2)
BASOPHILS NFR BLD AUTO: 0.4 %
BILIRUB SERPL-MCNC: 0.3 MG/DL (ref 0.2–1.3)
BUN SERPL-MCNC: 23 MG/DL (ref 7–30)
CALCIUM SERPL-MCNC: 8.7 MG/DL (ref 8.5–10.1)
CHLORIDE SERPL-SCNC: 105 MMOL/L (ref 94–109)
CO2 SERPL-SCNC: 28 MMOL/L (ref 20–32)
CREAT SERPL-MCNC: 0.9 MG/DL (ref 0.66–1.25)
DIFFERENTIAL METHOD BLD: NORMAL
EOSINOPHIL # BLD AUTO: 0.4 10E9/L (ref 0–0.7)
EOSINOPHIL NFR BLD AUTO: 3.8 %
ERYTHROCYTE [DISTWIDTH] IN BLOOD BY AUTOMATED COUNT: 13 % (ref 10–15)
GFR SERPL CREATININE-BSD FRML MDRD: >90 ML/MIN/{1.73_M2}
GLUCOSE SERPL-MCNC: 118 MG/DL (ref 70–99)
HCT VFR BLD AUTO: 45.3 % (ref 40–53)
HGB BLD-MCNC: 14.3 G/DL (ref 13.3–17.7)
IMM GRANULOCYTES # BLD: 0 10E9/L (ref 0–0.4)
IMM GRANULOCYTES NFR BLD: 0.4 %
LYMPHOCYTES # BLD AUTO: 1.9 10E9/L (ref 0.8–5.3)
LYMPHOCYTES NFR BLD AUTO: 19.2 %
MCH RBC QN AUTO: 30.4 PG (ref 26.5–33)
MCHC RBC AUTO-ENTMCNC: 31.6 G/DL (ref 31.5–36.5)
MCV RBC AUTO: 96 FL (ref 78–100)
MONOCYTES # BLD AUTO: 0.7 10E9/L (ref 0–1.3)
MONOCYTES NFR BLD AUTO: 7.5 %
NEUTROPHILS # BLD AUTO: 6.7 10E9/L (ref 1.6–8.3)
NEUTROPHILS NFR BLD AUTO: 68.7 %
NRBC # BLD AUTO: 0 10*3/UL
NRBC BLD AUTO-RTO: 0 /100
NT-PROBNP SERPL-MCNC: 2306 PG/ML (ref 0–125)
PLATELET # BLD AUTO: 218 10E9/L (ref 150–450)
POTASSIUM SERPL-SCNC: 4.5 MMOL/L (ref 3.4–5.3)
PROT SERPL-MCNC: 6.3 G/DL (ref 6.8–8.8)
RBC # BLD AUTO: 4.7 10E12/L (ref 4.4–5.9)
SODIUM SERPL-SCNC: 138 MMOL/L (ref 133–144)
WBC # BLD AUTO: 9.8 10E9/L (ref 4–11)

## 2019-11-20 PROCEDURE — 93282 PRGRMG EVAL IMPLANTABLE DFB: CPT

## 2019-11-20 PROCEDURE — 93451 RIGHT HEART CATH: CPT | Performed by: INTERNAL MEDICINE

## 2019-11-20 PROCEDURE — 36415 COLL VENOUS BLD VENIPUNCTURE: CPT | Performed by: INTERNAL MEDICINE

## 2019-11-20 PROCEDURE — C1894 INTRO/SHEATH, NON-LASER: HCPCS | Performed by: INTERNAL MEDICINE

## 2019-11-20 PROCEDURE — 25000125 ZZHC RX 250: Performed by: INTERNAL MEDICINE

## 2019-11-20 PROCEDURE — 40000166 ZZH STATISTIC PP CARE STAGE 1

## 2019-11-20 PROCEDURE — G0463 HOSPITAL OUTPT CLINIC VISIT: HCPCS | Mod: 25,ZF

## 2019-11-20 PROCEDURE — 93306 TTE W/DOPPLER COMPLETE: CPT | Mod: 26 | Performed by: INTERNAL MEDICINE

## 2019-11-20 PROCEDURE — 93451 RIGHT HEART CATH: CPT | Mod: 26 | Performed by: INTERNAL MEDICINE

## 2019-11-20 PROCEDURE — 27210788 ZZH MANIFOLD CR3: Performed by: INTERNAL MEDICINE

## 2019-11-20 PROCEDURE — 93282 PRGRMG EVAL IMPLANTABLE DFB: CPT | Mod: 26 | Performed by: INTERNAL MEDICINE

## 2019-11-20 PROCEDURE — 99214 OFFICE O/P EST MOD 30 MIN: CPT | Mod: 25 | Performed by: INTERNAL MEDICINE

## 2019-11-20 PROCEDURE — 27210794 ZZH OR GENERAL SUPPLY STERILE: Performed by: INTERNAL MEDICINE

## 2019-11-20 PROCEDURE — 85025 COMPLETE CBC W/AUTO DIFF WBC: CPT | Performed by: INTERNAL MEDICINE

## 2019-11-20 PROCEDURE — 80053 COMPREHEN METABOLIC PANEL: CPT | Performed by: INTERNAL MEDICINE

## 2019-11-20 PROCEDURE — 93306 TTE W/DOPPLER COMPLETE: CPT

## 2019-11-20 PROCEDURE — 83880 ASSAY OF NATRIURETIC PEPTIDE: CPT | Performed by: INTERNAL MEDICINE

## 2019-11-20 RX ORDER — LIDOCAINE 40 MG/G
CREAM TOPICAL
Status: COMPLETED | OUTPATIENT
Start: 2019-11-20 | End: 2019-11-20

## 2019-11-20 RX ADMIN — LIDOCAINE: 40 CREAM TOPICAL at 09:03

## 2019-11-20 SDOH — ECONOMIC STABILITY - INCOME SECURITY: PROBLEM RELATED TO HOUSING AND ECONOMIC CIRCUMSTANCES, UNSPECIFIED: Z59.9

## 2019-11-20 ASSESSMENT — PAIN SCALES - GENERAL: PAINLEVEL: NO PAIN (0)

## 2019-11-20 ASSESSMENT — MIFFLIN-ST. JEOR
SCORE: 1579.74
SCORE: 1579.74

## 2019-11-20 NOTE — IP AVS SNAPSHOT
[address] [address]                                    After Visit Summary   11/20/2019    Akshat Fragoso    MRN: [id number]           After Visit Summary Signature Page    I have received my discharge instructions, and my questions have been answered. I have discussed any challenges I see with this plan with the nurse or doctor.    ..........................................................................................................................................  Patient/Patient Representative Signature      ..........................................................................................................................................  Patient Representative Print Name and Relationship to Patient    ..................................................               ................................................  Date                                   Time    ..........................................................................................................................................  Reviewed by Signature/Title    ...................................................              ..............................................  Date                                               Time          22EPIC Rev 08/18

## 2019-11-20 NOTE — TELEPHONE ENCOUNTER
NITA lettget Guajardo know that PCP will be out of the office until 11/26 as a heads up since she is waiting for a call back.     Jillian TORRES     [facility]

## 2019-11-20 NOTE — NURSING NOTE
Chief Complaint   Patient presents with     Follow Up     4 month return-- ok per order--Chronic systolic heart failure secondary to NICM     Vitals were taken and medications were reconciled.     Denisha Marx RMA  2:59 PM

## 2019-11-20 NOTE — PATIENT INSTRUCTIONS
Thank you for your visit today.  Please call me with any questions or concerns.   Mando Kay RN  Cardiology Care Coordinator  [phone], press option 1 then option 4

## 2019-11-20 NOTE — LETTER
11/20/2019      RE: Akshat Fragoso  [address]  [facility] [address]       Dear Colleague,    Thank you for the opportunity to participate in the care of your patient, Akshat Fragoso, at the [facility]. Please see a copy of my visit note below.    November 20, 2019     Akshat Fragoso is a pleasant 54 year old male with a past medical history of nonischemic card myopathy (stage C, NYHA III) who presented for routine follow-up.  As noted in previous notes patient does have significantly reduced ejection fraction around 10-15% and has been initially been doing progressively worse. Given the above we did initiate LVAD and transplant evaluation that he partially completed thus far. He was last seen in this clinic on December 2018. Since then, he has been following up [place] [facility]. He was placed on entresto but was only able to tolerate it for a week then discontinued it. He mentioned that he was having significant dizziness, lightheadedness.  Notes that he continues to take all his medications as prescribed at this point.  He continues to have back pain and uses oxycodone 5 mg few times a day at maximum and only as an as-needed basis.  Notes that he has been using less recently than in the past.  He continues to have significant shortness of breath and exercise limitation currently at least class III symptoms.  At rest he feels okay and he does not feel short of breath and denies any PND orthopnea however he is fairly limited again with class III symptoms with exertion.  He did quit smoking and denies any alcohol or illicit drug use.  Denies any dizziness lightheadedness no lower extremity edema and no episodes of chest pains at this time.  He remains off Entresto at this point due to the prior side effects. He also notes that he has been using more albuterol and is concerned about it and was wondering if he could see pulmonary for Symbicort or  other medication prescription.    He did have Many studies today as part of his avoiding advanced heart failure therapy evaluations less monitoring.  This included right heart catheterization as well as an echocardiogram.  We will unable to schedule a CPX but will discuss when this will be necessary.     ROS otherwise negative     PAST MEDICAL HISTORY:  - Nonischemic cardiomyopathy              - Stage C, NYHA III              - Suspected to be due to myocarditis  - Right lumbar radiculopathy      PAST MEDICAL HISTORY:  Past Medical History:   Diagnosis Date     Acute right lumbar radiculopathy 11/2/2015     Acute systolic heart failure (H) 1/10/2017     Cardiomyopathy (H) 1/10/2017     CARDIOVASCULAR SCREENING; LDL GOAL LESS THAN 160 5/9/2010     JOHNSON (dyspnea on exertion)      Erectile dysfunction      Personal history of smoking 1/4/2017     Pneumonia 11/2011     FAMILY HISTORY:  No family history on file.     SOCIAL HISTORY:  Social History     Socioeconomic History     Marital status:      Spouse name: Cheryl     Number of children: 2     Years of education: None     Highest education level: None   Occupational History     Occupation: Construction      Employer: SELF   Social Needs     Financial resource strain: None     Food insecurity:     Worry: None     Inability: None     Transportation needs:     Medical: None     Non-medical: None   Tobacco Use     Smoking status: Light Tobacco Smoker     Packs/day: 0.25     Years: 15.00     Pack years: 3.75     Types: Cigarettes     Smokeless tobacco: Former User     Quit date: 10/24/2011   Substance and Sexual Activity     Alcohol use: No     Alcohol/week: 0.0 standard drinks     Drug use: No     Sexual activity: Yes     Partners: Female     Birth control/protection: Condom   Lifestyle     Physical activity:     Days per week: None     Minutes per session: None     Stress: None   Relationships     Social connections:     Talks on phone: None     Gets together:  "None     Attends Mu-ism service: None     Active member of club or organization: None     Attends meetings of clubs or organizations: None     Relationship status: None     Intimate partner violence:     Fear of current or ex partner: None     Emotionally abused: None     Physically abused: None     Forced sexual activity: None   Other Topics Concern     Parent/sibling w/ CABG, MI or angioplasty before 65F 55M? Yes     Comment: both parents had stints placed    Social History Narrative     None     CURRENT MEDICATIONS:  Current Outpatient Medications   Medication     albuterol (PROAIR HFA/PROVENTIL HFA/VENTOLIN HFA) 108 (90 Base) MCG/ACT inhaler     aspirin 81 MG tablet     Blood Pressure Monitor KIT     carvedilol (COREG) 12.5 MG tablet     furosemide (LASIX) 20 MG tablet     lisinopril (PRINIVIL/ZESTRIL) 5 MG tablet     oxyCODONE-acetaminophen (PERCOCET) 5-325 MG tablet     sildenafil (VIAGRA) 50 MG tablet     spironolactone (ALDACTONE) 25 MG tablet     VENTOLIN  (90 Base) MCG/ACT inhaler     No current facility-administered medications for this visit.       ROS:   Constitutional: No fever, chills, or sweats. Weight is 170 lbs 0 oz  ENT: No visual disturbance, ear ache, epistaxis, sore throat.   Allergies/Immunologic: Negative.   Respiratory: No cough, hemoptysis.   Cardiovascular: As per HPI.   GI: No nausea, vomiting, hematemesis, melena, or hematochezia.   : No urinary frequency, dysuria, or hematuria.   Integument: Negative.   Psychiatric: Pleasant, no major depression noted  Neuro: No focal neurological deficits noted  Endocrinology: Negative.   Musculoskeletal: As per HPI.      EXAM:  Ht 1.702 m (5' 7\")   Wt 77.1 kg (170 lb)   BMI 26.63 kg/m      /73 (BP Location: Right arm, Patient Position: Chair, Cuff Size: Adult Regular)   Pulse 83   Ht 1.702 m (5' 7\")   Wt 77.1 kg (170 lb)   SpO2 94%   BMI 26.63 kg/m     General: appears comfortable, alert and oriented  Head: normocephalic, " atraumatic  Eyes: anicteric sclera, EOMI , PERRL  Neck: no adenopathy  Orophyarynx: moist mucosa, no lesions noted  Heart: regular, S1/S2, no murmurs, rubs or gallop. Estimated JVP at 5 cmH2O  Lungs: CTAB, No wheezing.   Abdomen: soft, non-tender, bowel sounds present, no hepatosplenomegaly  Extremities: No LE edema today  Skin: no open lesions noted  Neuro: grossly non-focal     Labs:  Lab Results   Component Value Date    WBC 9.8 11/20/2019    HGB 14.3 11/20/2019    HCT 45.3 11/20/2019     11/20/2019     11/20/2019    POTASSIUM 4.5 11/20/2019    CHLORIDE 105 11/20/2019    CO2 28 11/20/2019    BUN 23 11/20/2019    CR 0.90 11/20/2019     (H) 11/20/2019    NTBNPI 11,183 (H) 01/09/2017    NTBNP 2,306 (H) 11/20/2019    TROPI 0.033 01/09/2017    AST 16 11/20/2019    ALT 22 11/20/2019    ALKPHOS 68 11/20/2019    BILITOTAL 0.3 11/20/2019    INR 1.01 11/19/2018     Akshat Fragoso is a 54 year old male with a PMHx of nonischemic cardiomyopathy (stage C, NYHA III) who presented for follow-up.   On today's evaluation he continues to have class III symptoms with minimal evidence of volume overload.  Right heart catheterization has been performed which showed essentially normal filling pressures in addition to borderline reduced cardiac output/index.  Echocardiogram performed today showed persistently reduced ejection fraction in the 11 to 12% range mostly unchanged from prior.  CPX we were unable to schedule but we will continue to follow.  At this time he agrees that we should maybe hold off on further advanced therapy evaluation will continue to monitor him closely and make the move as soon as there is any significant worsening in his clinical status.  We do not want to miss the window of opportunity for safe procedures.  I have a little bit less worried about his oxycodone use today and he is going to see the pain clinic for this.  He is worried, appropriately, about increased albuterol use and we put in  a referral for the pulmonary clinic for further evaluation and possible management of his COPD/asthma.  He will continue lisinopril and we will not rechallenge him with Entresto.  They also said that upon his initial presentation the encountered significant amount of medical bills and we are requesting whether we could provide any assistant for financial counseling.  This was done today.  Again we will continue to monitor him very closely and I will see him back in the next 3 months time.  He will let us know immediately should he develop any new symptoms or new issues.  Continue to discourage opioid use for pain control given tolerance and side effect profiles.    I appreciate the opportunity to participate in the care of Akshat Fragoso . Please do not hesitate to contact me with any further questions.      Sincerely,      Shaun Aguiar MD     [facility] Division of Cardiology

## 2019-11-20 NOTE — PROGRESS NOTES
1040:  Received from [facility] s/p [facility]. Right internal jugular band aid dressing clean, flat, dry, and intact, Denies pain. Requesting water. Discharge instructions have been reviewed and copy given to patient. 1045: Water given. 1050:  Discharged to self care. Patient walked self to [facility] waiting room for next appoinment.

## 2019-11-20 NOTE — PATIENT INSTRUCTIONS
It was a pleasure to see you in clinic today.  Please do not hesitate to call with any questions or concerns.  You are scheduled for a remote transmission on 2/20/20.  We look forward to seeing you in clinic at your next device check in 6 months.    Mirella Gao, RN, MS, CCRN  Electrophysiology Nurse Clinician  [facility]    During Business Hours Please Call:  [phone]  After Hours Please Call:  [phone] - select option #4 and ask for job code [id number]

## 2019-11-20 NOTE — DISCHARGE INSTRUCTIONS
[facility]                        Interventional Cardiology  Discharge Instructions   Post Right Heart Cath      AFTER YOU GO HOME:    DO drink plenty of fluids    DO resume your regular diet and medications unless otherwise instructed by your Primary Physician    Do Not scrub the procedure site vigorously    No lotion or powder to the puncture site for 3 days    CALL YOUR PRIMARY PHYSICIAN IF: You may resume all normal activity.  Monitor neck site for bleeding, swelling, or voice changes. If you notice bleeding or swelling immediately apply pressure to the site and call number below to speak with Cardiology Fellow.  If you experience any changes in your breathing you should call your doctor immediately or come to the closest Emergency Department.  Do not drive yourself.    ADDITIONAL INSTRUCTIONS: Medications: You are to resume all home medications including anticoagulation therapy unless otherwise advised by your primary cardiologist or nurse coordinator.    Follow Up: Per your primary cardiology team    If you have any questions or concerns regarding your procedure site please call [phone] at anytime and ask for Cardiology Fellow on call.  They are available 24 hours a day.  You may also contact the Cardiology Clinic after hours number at [phone].                                                       Telephone Numbers [phone] Monday-Friday 8:00 am to 4:30 pm    [phone] After 4:30 pm Monday-Friday, Weekends & Holidays  Ask for Interventional Cardiologist on call. Someone is on call 24 hours/day   [facility] toll free number [phone] Monday-Friday 8:00 am to 4:30 pm   [facility] Emergency Dept [phone]

## 2019-11-20 NOTE — Clinical Note
Potential access sites were evaluated for patency using ultrasound.   The right internal jugular vein was selected. Access was obtained under with Sonosite guidance using a micropuncture 21 guage needle with direct visualization of needle entry.

## 2019-11-20 NOTE — TELEPHONE ENCOUNTER
Did you tell amara the pcp was out of the office until 11/26?  If not, please do.     Thanks!     Alisa Hunter RN

## 2019-11-20 NOTE — PROGRESS NOTES
November 20, 2019     Akshat Fragoso is a pleasant 54 year old male with a past medical history of nonischemic card myopathy (stage C, NYHA III) who presented for routine follow-up.  As noted in previous notes patient does have significantly reduced ejection fraction around 10-15% and has been initially been doing progressively worse. Given the above we did initiate LVAD and transplant evaluation that he partially completed thus far. He was last seen in this clinic on December 2018. Since then, he has been following up at the [facility]. He was placed on entresto but was only able to tolerate it for a week then discontinued it. He mentioned that he was having significant dizziness, lightheadedness.  Notes that he continues to take all his medications as prescribed at this point.  He continues to have back pain and uses oxycodone 5 mg few times a day at maximum and only as an as-needed basis.  Notes that he has been using less recently than in the past.  He continues to have significant shortness of breath and exercise limitation currently at least class III symptoms.  At rest he feels okay and he does not feel short of breath and denies any PND orthopnea however he is fairly limited again with class III symptoms with exertion.  He did quit smoking and denies any alcohol or illicit drug use.  Denies any dizziness lightheadedness no lower extremity edema and no episodes of chest pains at this time.  He remains off Entresto at this point due to the prior side effects. He also notes that he has been using more albuterol and is concerned about it and was wondering if he could see pulmonary for Symbicort or other medication prescription.    He did have Many studies today as part of his avoiding advanced heart failure therapy evaluations less monitoring.  This included right heart catheterization as well as an echocardiogram.  We will unable to schedule a CPX but will discuss when this will be necessary.     ROS otherwise  negative     PAST MEDICAL HISTORY:  - Nonischemic cardiomyopathy              - Stage C, NYHA III              - Suspected to be due to myocarditis  - Right lumbar radiculopathy      PAST MEDICAL HISTORY:  Past Medical History:   Diagnosis Date     Acute right lumbar radiculopathy 11/2/2015     Acute systolic heart failure (H) 1/10/2017     Cardiomyopathy (H) 1/10/2017     CARDIOVASCULAR SCREENING; LDL GOAL LESS THAN 160 5/9/2010     JOHNSON (dyspnea on exertion)      Erectile dysfunction      Personal history of smoking 1/4/2017     Pneumonia 11/2011     FAMILY HISTORY:  No family history on file.     SOCIAL HISTORY:  Social History     Socioeconomic History     Marital status:      Spouse name: Cheryl     Number of children: 2     Years of education: None     Highest education level: None   Occupational History     Occupation: Construction      Employer: SELF   Social Needs     Financial resource strain: None     Food insecurity:     Worry: None     Inability: None     Transportation needs:     Medical: None     Non-medical: None   Tobacco Use     Smoking status: Light Tobacco Smoker     Packs/day: 0.25     Years: 15.00     Pack years: 3.75     Types: Cigarettes     Smokeless tobacco: Former User     Quit date: 10/24/2011   Substance and Sexual Activity     Alcohol use: No     Alcohol/week: 0.0 standard drinks     Drug use: No     Sexual activity: Yes     Partners: Female     Birth control/protection: Condom   Lifestyle     Physical activity:     Days per week: None     Minutes per session: None     Stress: None   Relationships     Social connections:     Talks on phone: None     Gets together: None     Attends [employer] service: None     Active member of club or organization: None     Attends meetings of clubs or organizations: None     Relationship status: None     Intimate partner violence:     Fear of current or ex partner: None     Emotionally abused: None     Physically abused: None     Forced sexual  "activity: None   Other Topics Concern     Parent/sibling w/ CABG, MI or angioplasty before 65F 55M? Yes     Comment: both parents had stints placed    Social History Narrative     None     CURRENT MEDICATIONS:  Current Outpatient Medications   Medication     albuterol (PROAIR HFA/PROVENTIL HFA/VENTOLIN HFA) 108 (90 Base) MCG/ACT inhaler     aspirin 81 MG tablet     Blood Pressure Monitor KIT     carvedilol (COREG) 12.5 MG tablet     furosemide (LASIX) 20 MG tablet     lisinopril (PRINIVIL/ZESTRIL) 5 MG tablet     oxyCODONE-acetaminophen (PERCOCET) 5-325 MG tablet     sildenafil (VIAGRA) 50 MG tablet     spironolactone (ALDACTONE) 25 MG tablet     VENTOLIN  (90 Base) MCG/ACT inhaler     No current facility-administered medications for this visit.       ROS:   Constitutional: No fever, chills, or sweats. Weight is 170 lbs 0 oz  ENT: No visual disturbance, ear ache, epistaxis, sore throat.   Allergies/Immunologic: Negative.   Respiratory: No cough, hemoptysis.   Cardiovascular: As per HPI.   GI: No nausea, vomiting, hematemesis, melena, or hematochezia.   : No urinary frequency, dysuria, or hematuria.   Integument: Negative.   Psychiatric: Pleasant, no major depression noted  Neuro: No focal neurological deficits noted  Endocrinology: Negative.   Musculoskeletal: As per HPI.      EXAM:  Ht 1.702 m (5' 7\")   Wt 77.1 kg (170 lb)   BMI 26.63 kg/m     /73 (BP Location: Right arm, Patient Position: Chair, Cuff Size: Adult Regular)   Pulse 83   Ht 1.702 m (5' 7\")   Wt 77.1 kg (170 lb)   SpO2 94%   BMI 26.63 kg/m    General: appears comfortable, alert and oriented  Head: normocephalic, atraumatic  Eyes: anicteric sclera, EOMI , PERRL  Neck: no adenopathy  Orophyarynx: moist mucosa, no lesions noted  Heart: regular, S1/S2, no murmurs, rubs or gallop. Estimated JVP at 5 cmH2O  Lungs: CTAB, No wheezing.   Abdomen: soft, non-tender, bowel sounds present, no hepatosplenomegaly  Extremities: No LE edema " today  Skin: no open lesions noted  Neuro: grossly non-focal     Labs:  Lab Results   Component Value Date    WBC 9.8 11/20/2019    HGB 14.3 11/20/2019    HCT 45.3 11/20/2019     11/20/2019     11/20/2019    POTASSIUM 4.5 11/20/2019    CHLORIDE 105 11/20/2019    CO2 28 11/20/2019    BUN 23 11/20/2019    CR 0.90 11/20/2019     (H) 11/20/2019    NTBNPI 11,183 (H) 01/09/2017    NTBNP 2,306 (H) 11/20/2019    TROPI 0.033 01/09/2017    AST 16 11/20/2019    ALT 22 11/20/2019    ALKPHOS 68 11/20/2019    BILITOTAL 0.3 11/20/2019    INR 1.01 11/19/2018     Akshat Fragoso is a 54 year old male with a PMHx of nonischemic cardiomyopathy (stage C, NYHA III) who presented for follow-up.   On today's evaluation he continues to have class III symptoms with minimal evidence of volume overload.  Right heart catheterization has been performed which showed essentially normal filling pressures in addition to borderline reduced cardiac output/index.  Echocardiogram performed today showed persistently reduced ejection fraction in the 11 to 12% range mostly unchanged from prior.  CPX we were unable to schedule but we will continue to follow.  At this time he agrees that we should maybe hold off on further advanced therapy evaluation will continue to monitor him closely and make the move as soon as there is any significant worsening in his clinical status.  We do not want to miss the window of opportunity for safe procedures.  I have a little bit less worried about his oxycodone use today and he is going to see the pain clinic for this.  He is worried, appropriately, about increased albuterol use and we put in a referral for the pulmonary clinic for further evaluation and possible management of his COPD/asthma.  He will continue lisinopril and we will not rechallenge him with Entresto.  They also said that upon his initial presentation the encountered significant amount of medical bills and we are requesting whether we  could provide any assistant for financial counseling.  This was done today.  Again we will continue to monitor him very closely and I will see him back in the next 3 months time.  He will let us know immediately should he develop any new symptoms or new issues.  Continue to discourage opioid use for pain control given tolerance and side effect profiles.    I appreciate the opportunity to participate in the care of Akshat Fragoso . Please do not hesitate to contact me with any further questions.      Sincerely,      Shaun Aguiar MD     [facility] Division of Cardiology

## 2019-11-21 ENCOUNTER — RESEARCH ENCOUNTER (OUTPATIENT)
Dept: RESEARCH | Facility: CLINIC | Age: 54
End: 2019-11-21

## 2019-11-21 NOTE — PROGRESS NOTES
Research Consent Note:    Study Title: Hemodynamic-GUIDEd Management of Heart Failure (GUIDE-HF)  IRB # [id number]    : Shaun Aguiar [phone]  : Viry Marte [phone]     Estimated duration of study: 12 months    The IRB approved consent form was reviewed with the patient. The purpose, risks, and benefits of study participation were discussed as well as double blinding, randomization, and study requirements for participation. Randomization arms (treatment vs control) were explained in detail. Confidentiality issues and release of Personal Health Information were reviewed. It was discussed that study participation is voluntary and that refusal to participate will involve no penalty or decrease benefits. The patient was informed they may discontinue participation at any time without penalty or loss of benefits.  The patient had the opportunity to read the entire written consent, ask questions, and offered sufficient time to consider the research trial. Subject verbalized understanding and was able to state what study participation involved. Patient signed the consent and HIPAA forms on 11/20/19 at approximately 4:00 PM and was given signed copies of both.    Viry Marte RN, BSN, MS  Research Nurse Coordinator  [facility] Clinical Research Unit  Pager# [phone]  Office# [phone]

## 2019-12-02 ENCOUNTER — PATIENT OUTREACH (OUTPATIENT)
Dept: CARE COORDINATION | Facility: CLINIC | Age: 54
End: 2019-12-02

## 2019-12-02 NOTE — PROGRESS NOTES
Social Work Telephone Message Note  [facility] [place]    Patient Name:  Akshat Fragoso  /Age:  1965 (54 year old)    Referral Source: Madno Kay RN cardioloty  Reason for Referral:  Medical bills    Contacted Patient on 2019 and 2019. Messages were left on Patient's voicemail. Will await Patient's return phone call and will provide assistance at that time.    ACE Shaw, [facility]    [facility] [place]  [phone]

## 2019-12-04 NOTE — TELEPHONE ENCOUNTER
I placed a telephone call to Casandra Liu at the pre-and post heart cath center/interventional radiology.  Elton was the RN involved in the care of Akshat when he had his heart cath done in November.  Per Casandra, Akshat's wife told her  That Akshat does not have a daughter who passed away and he did not have to go to [place] to close her estate.  Instead, he went hunting.    I will no longer prescribe any controlled substances for this patient.  He falsified information to me for the intent to get narcotics.  Not only did he get an initial prescription of narcotics, he lied again and got refills.

## 2019-12-10 DIAGNOSIS — R06.09 DOE (DYSPNEA ON EXERTION): ICD-10-CM

## 2019-12-10 DIAGNOSIS — I50.23 ACUTE ON CHRONIC SYSTOLIC CONGESTIVE HEART FAILURE (H): ICD-10-CM

## 2019-12-10 NOTE — LETTER
[facility]  [address] [place] [address]  Phone: [phone]    12/13/19    Akshat Fragoso  [address] [facility] [address]      To whom it may concern:     In order to ensure we are providing the best quality care, we have reviewed your chart and see that you are due for a hospital follow up on your breathing.    We have also sent your albuterol (PROAIR HFA/PROVENTIL HFA/VENTOLIN HFA) 108 (90 Base) MCG/ACT inhaler medication to your pharmacy. For future medication refills, please contact the clinic to schedule the above appointment. This can be requested via [employer] or by calling the clinic at [phone].    We greatly appreciate the opportunity to serve you. Thank you for trusting us with your health care.      Sincerely,    Your care team at [facility]

## 2019-12-12 RX ORDER — ALBUTEROL SULFATE 90 UG/1
AEROSOL, METERED RESPIRATORY (INHALATION)
Qty: 18 G | Refills: 0 | Status: SHIPPED | OUTPATIENT
Start: 2019-12-12 | End: 2020-01-02

## 2019-12-12 NOTE — TELEPHONE ENCOUNTER
"Requested Prescriptions   Pending Prescriptions Disp Refills     albuterol (PROAIR HFA/PROVENTIL HFA/VENTOLIN HFA) 108 (90 Base) MCG/ACT inhaler [Pharmacy Med Name: ALBUTEROL HFA INH (200 PUFFS) 18GM] 18 g 0     Sig: INHALE 2 PUFFS BY MOUTH INTO THE LUNGS EVERY 4 HOURS AS NEEDED FOR SHORTNESS OF BREATH, DYSPNEA, OR WHEEZING.      Last Written Prescription Date:  10/2/2019  Last Fill Quantity: 1 inhaler    ,  # refills: 0   Last Office Visit: 10/15/2019   Future Office Visit:            Asthma Maintenance Inhalers - Anticholinergics Passed - 12/10/2019  3:36 AM        Passed - Patient is age 12 years or older        Passed - Recent (12 mo) or future (30 days) visit within the authorizing provider's specialty     Patient has had an office visit with the authorizing provider or a provider within the authorizing providers department within the previous 12 mos or has a future within next 30 days. See \"Patient Info\" tab in inbasket, or \"Choose Columns\" in Meds & Orders section of the refill encounter.          Passed - Medication is active on med list        No flowsheet data found.    "

## 2019-12-13 NOTE — TELEPHONE ENCOUNTER
HP Reception Medication is being filled for 1 time refill only due to:  Patient needs to be seen because due for hospital f/u on breathing and heart.     Signed Prescriptions:                        Disp   Refills    albuterol (PROAIR HFA/PROVENTIL HFA/VENTOL*18 g   0        Sig: INHALE 2 PUFFS BY MOUTH INTO THE LUNGS EVERY 4 HOURS           AS NEEDED FOR SHORTNESS OF BREATH, DYSPNEA, OR           WHEEZING.  Authorizing Provider: ANNE DURBIN  Ordering User: CALIXTO MELISSA      Inpatient admission for Dyspnea and Acute CHF - these are dx associated with rx

## 2019-12-13 NOTE — TELEPHONE ENCOUNTER
LM to return clinic phone call. Patient is due for hospital f/u.  Patient informed that a medication refill was sent to their pharmacy.       Sending letter as pt does not read [employer] messages regularly.       Jillian TORRES     [facility]

## 2019-12-15 ENCOUNTER — ANCILLARY PROCEDURE (OUTPATIENT)
Dept: CARDIOLOGY | Facility: CLINIC | Age: 54
End: 2019-12-15
Attending: INTERNAL MEDICINE
Payer: COMMERCIAL

## 2019-12-15 DIAGNOSIS — I42.8 NONISCHEMIC CARDIOMYOPATHY (H): ICD-10-CM

## 2019-12-15 PROCEDURE — 93296 REM INTERROG EVL PM/IDS: CPT | Mod: ZF

## 2019-12-15 PROCEDURE — 93295 DEV INTERROG REMOTE 1/2/MLT: CPT | Mod: ZP | Performed by: INTERNAL MEDICINE

## 2019-12-19 ENCOUNTER — TELEPHONE (OUTPATIENT)
Dept: FAMILY MEDICINE | Facility: CLINIC | Age: 54
End: 2019-12-19

## 2019-12-19 DIAGNOSIS — Z20.828 EXPOSURE TO INFLUENZA: Primary | ICD-10-CM

## 2019-12-19 RX ORDER — OSELTAMIVIR PHOSPHATE 75 MG/1
75 CAPSULE ORAL DAILY
Qty: 10 CAPSULE | Refills: 0 | Status: SHIPPED | OUTPATIENT
Start: 2019-12-19 | End: 2020-01-30

## 2019-12-19 NOTE — TELEPHONE ENCOUNTER
Dad should start prophylactic Tamiflu.  (I don't know if dad is a patient here.  If he's a [facility] patient I can prescribe that for him.)  Camille Fajardo MD     Patient is patient of [facility].   Patient states I am on a lot of Heart medications and wants to ensure no interactions with Tamiflu--    Pharmacy --[facility]    Thanks! Jacqueline Burr RN

## 2019-12-20 LAB
MDC_IDC_EPISODE_DTM: NORMAL
MDC_IDC_EPISODE_ID: NORMAL
MDC_IDC_EPISODE_TYPE: NORMAL
MDC_IDC_LEAD_IMPLANT_DT: NORMAL
MDC_IDC_LEAD_LOCATION: NORMAL
MDC_IDC_LEAD_LOCATION_DETAIL_1: NORMAL
MDC_IDC_LEAD_MFG: NORMAL
MDC_IDC_LEAD_MODEL: NORMAL
MDC_IDC_LEAD_POLARITY_TYPE: NORMAL
MDC_IDC_LEAD_SERIAL: NORMAL
MDC_IDC_MSMT_BATTERY_DTM: NORMAL
MDC_IDC_MSMT_BATTERY_REMAINING_LONGEVITY: 144 MO
MDC_IDC_MSMT_BATTERY_REMAINING_PERCENTAGE: 100 %
MDC_IDC_MSMT_BATTERY_STATUS: NORMAL
MDC_IDC_MSMT_CAP_CHARGE_DTM: NORMAL
MDC_IDC_MSMT_CAP_CHARGE_TIME: 9.9 S
MDC_IDC_MSMT_CAP_CHARGE_TYPE: NORMAL
MDC_IDC_MSMT_LEADCHNL_RV_IMPEDANCE_VALUE: 483 OHM
MDC_IDC_PG_IMPLANT_DTM: NORMAL
MDC_IDC_PG_MFG: NORMAL
MDC_IDC_PG_MODEL: NORMAL
MDC_IDC_PG_SERIAL: NORMAL
MDC_IDC_PG_TYPE: NORMAL
MDC_IDC_SESS_CLINIC_NAME: NORMAL
MDC_IDC_SESS_DTM: NORMAL
MDC_IDC_SESS_TYPE: NORMAL
MDC_IDC_SET_BRADY_LOWRATE: 40 {BEATS}/MIN
MDC_IDC_SET_BRADY_MODE: NORMAL
MDC_IDC_SET_LEADCHNL_RV_PACING_AMPLITUDE: 2.4 V
MDC_IDC_SET_LEADCHNL_RV_PACING_POLARITY: NORMAL
MDC_IDC_SET_LEADCHNL_RV_PACING_PULSEWIDTH: 0.4 MS
MDC_IDC_SET_LEADCHNL_RV_SENSING_ADAPTATION_MODE: NORMAL
MDC_IDC_SET_LEADCHNL_RV_SENSING_POLARITY: NORMAL
MDC_IDC_SET_LEADCHNL_RV_SENSING_SENSITIVITY: 0.6 MV
MDC_IDC_SET_ZONE_DETECTION_INTERVAL: 300 MS
MDC_IDC_SET_ZONE_DETECTION_INTERVAL: 353 MS
MDC_IDC_SET_ZONE_TYPE: NORMAL
MDC_IDC_SET_ZONE_TYPE: NORMAL
MDC_IDC_SET_ZONE_VENDOR_TYPE: NORMAL
MDC_IDC_SET_ZONE_VENDOR_TYPE: NORMAL
MDC_IDC_STAT_BRADY_DTM_END: NORMAL
MDC_IDC_STAT_BRADY_DTM_START: NORMAL
MDC_IDC_STAT_BRADY_RV_PERCENT_PACED: 0 %
MDC_IDC_STAT_EPISODE_RECENT_COUNT: 0
MDC_IDC_STAT_EPISODE_RECENT_COUNT_DTM_END: NORMAL
MDC_IDC_STAT_EPISODE_RECENT_COUNT_DTM_START: NORMAL
MDC_IDC_STAT_EPISODE_TYPE: NORMAL
MDC_IDC_STAT_EPISODE_VENDOR_TYPE: NORMAL
MDC_IDC_STAT_TACHYTHERAPY_ATP_DELIVERED_RECENT: 0
MDC_IDC_STAT_TACHYTHERAPY_ATP_DELIVERED_TOTAL: 0
MDC_IDC_STAT_TACHYTHERAPY_RECENT_DTM_END: NORMAL
MDC_IDC_STAT_TACHYTHERAPY_RECENT_DTM_START: NORMAL
MDC_IDC_STAT_TACHYTHERAPY_SHOCKS_ABORTED_RECENT: 0
MDC_IDC_STAT_TACHYTHERAPY_SHOCKS_ABORTED_TOTAL: 0
MDC_IDC_STAT_TACHYTHERAPY_SHOCKS_DELIVERED_RECENT: 0
MDC_IDC_STAT_TACHYTHERAPY_SHOCKS_DELIVERED_TOTAL: 0
MDC_IDC_STAT_TACHYTHERAPY_TOTAL_DTM_END: NORMAL
MDC_IDC_STAT_TACHYTHERAPY_TOTAL_DTM_START: NORMAL

## 2019-12-20 NOTE — TELEPHONE ENCOUNTER
Yes, it is fine for him to start the Tamiflu.  I sent that to his pharmacy.      Camille Fajardo MD

## 2020-01-01 DIAGNOSIS — I50.23 ACUTE ON CHRONIC SYSTOLIC CONGESTIVE HEART FAILURE (H): ICD-10-CM

## 2020-01-01 DIAGNOSIS — R06.09 DOE (DYSPNEA ON EXERTION): ICD-10-CM

## 2020-01-02 RX ORDER — ALBUTEROL SULFATE 90 UG/1
AEROSOL, METERED RESPIRATORY (INHALATION)
Qty: 8.5 G | Refills: 0 | Status: SHIPPED | OUTPATIENT
Start: 2020-01-02 | End: 2020-01-14

## 2020-01-02 NOTE — TELEPHONE ENCOUNTER
"Requested Prescriptions   Pending Prescriptions Disp Refills     albuterol (PROAIR HFA/PROVENTIL HFA/VENTOLIN HFA) 108 (90 Base) MCG/ACT inhaler [Pharmacy Med Name: ALBUTEROL HFA INH (200 PUFFS) 8.5GM]  Last Written Prescription Date:  12-12-19  Last Fill Quantity: 18G,  # refills: 0   Last office visit: 10/15/2019 with prescribing provider:  Denise White   Future Office Visit:   Next 5 appointments (look out 90 days)    Jan 09, 2020  9:20 AM CST  Office Visit with VERONICA Hallman CNP  [facility] [address] [place] [address] [phone]       8.5 g      Sig: INHALE 2 PUFFS EVERY 4 HOURS AS NEEDED FOR SHORTNESS OF BREATH, DYSPNEA, OR WHEEZING       Asthma Maintenance Inhalers - Anticholinergics Passed - 1/1/2020  3:36 AM        Passed - Patient is age 12 years or older        Passed - Recent (12 mo) or future (30 days) visit within the authorizing provider's specialty     Patient has had an office visit with the authorizing provider or a provider within the authorizing providers department within the previous 12 mos or has a future within next 30 days. See \"Patient Info\" tab in inbasket, or \"Choose Columns\" in Meds & Orders section of the refill encounter.          Passed - Medication is active on med list         "

## 2020-01-04 DIAGNOSIS — R06.09 DOE (DYSPNEA ON EXERTION): ICD-10-CM

## 2020-01-04 DIAGNOSIS — I50.23 ACUTE ON CHRONIC SYSTOLIC CONGESTIVE HEART FAILURE (H): ICD-10-CM

## 2020-01-04 NOTE — TELEPHONE ENCOUNTER
"Requested Prescriptions   Pending Prescriptions Disp Refills     albuterol (PROAIR HFA/PROVENTIL HFA/VENTOLIN HFA) 108 (90 Base) MCG/ACT inhaler [Pharmacy Med Name: ALBUTEROL HFA INH (200 PUFFS) 18GM]  Last Written Prescription Date:  1/2/2020  Last Fill Quantity: 8.5 g,  # refills: 0   Last office visit: 10/15/2019 with prescribing provider:  Christopher   Future Office Visit:   Next 5 appointments (look out 90 days)    Jan 09, 2020  9:20 AM CST  Office Visit with VERONICA Hallman CNP  [facility] [address] [place] [address] [phone]        54 g      Sig: INHALE 2 PUFFS EVERY 4 HOURS AS NEEDED FOR SHORTNESS OF BREATH, DYSAPNEA, OR WHEEZING       Asthma Maintenance Inhalers - Anticholinergics Passed - 1/4/2020  2:35 PM        Passed - Patient is age 12 years or older        Passed - Recent (12 mo) or future (30 days) visit within the authorizing provider's specialty     Patient has had an office visit with the authorizing provider or a provider within the authorizing providers department within the previous 12 mos or has a future within next 30 days. See \"Patient Info\" tab in inbasket, or \"Choose Columns\" in Meds & Orders section of the refill encounter.              Passed - Medication is active on med list         "

## 2020-01-06 DIAGNOSIS — I50.23 ACUTE ON CHRONIC SYSTOLIC CONGESTIVE HEART FAILURE (H): ICD-10-CM

## 2020-01-06 DIAGNOSIS — R06.09 DOE (DYSPNEA ON EXERTION): ICD-10-CM

## 2020-01-06 RX ORDER — ALBUTEROL SULFATE 90 UG/1
AEROSOL, METERED RESPIRATORY (INHALATION)
Qty: 0.1 G | Refills: 0 | OUTPATIENT
Start: 2020-01-06

## 2020-01-06 NOTE — TELEPHONE ENCOUNTER
"Requested Prescriptions   Pending Prescriptions Disp Refills     albuterol (PROAIR HFA/PROVENTIL HFA/VENTOLIN HFA) 108 (90 Base) MCG/ACT inhaler [Pharmacy Med Name: ALBUTEROL HFA INH (200 PUFFS) 18GM]  This maybe too early.   Last Written Prescription Date:  1-2-20  Last Fill Quantity: 8.5 g,  # refills: 0   Last office visit: 10/15/2019 with prescribing provider:  Denise White   Future Office Visit:   Next 5 appointments (look out 90 days)    Jan 09, 2020  9:20 AM CST  Office Visit with VERONICA Hallman CNP  [facility] [address] [place] [id number] [phone]       18 g      Sig: INHALE 2 PUFFS BY MOUTH EVERY 4 HOURS AS NEEDED FOR SHORTNESS OF BREATH OR DIFFICULT BREATHING OR WHEEZING       Asthma Maintenance Inhalers - Anticholinergics Passed - 1/6/2020  8:46 AM        Passed - Patient is age 12 years or older        Passed - Recent (12 mo) or future (30 days) visit within the authorizing provider's specialty     Patient has had an office visit with the authorizing provider or a provider within the authorizing providers department within the previous 12 mos or has a future within next 30 days. See \"Patient Info\" tab in inbasket, or \"Choose Columns\" in Meds & Orders section of the refill encounter.            Passed - Medication is active on med list         "

## 2020-01-06 NOTE — TELEPHONE ENCOUNTER
Refused Prescriptions:                       Disp   Refills    albuterol (PROAIR HFA/PROVENTIL HFA/VENTOL*0.1 g  0        Sig: INHALE 2 PUFFS EVERY 4 HOURS AS NEEDED FOR SHORTNESS           OF BREATH, DYSAPNEA, OR WHEEZING  Refused By: CALIXTO MELISSA  Reason for Refusal: Duplicate

## 2020-01-06 NOTE — TELEPHONE ENCOUNTER
Refused Prescriptions:                       Disp   Refills    albuterol (PROAIR HFA/PROVENTIL HFA/VENTOL*0.1 g  0        Sig: INHALE 2 PUFFS BY MOUTH EVERY 4 HOURS AS NEEDED FOR           SHORTNESS OF BREATH OR DIFFICULT BREATHING OR           WHEEZING  Refused By: CALIXTO MELISSA  Reason for Refusal: Duplicate

## 2020-01-14 ENCOUNTER — NURSE TRIAGE (OUTPATIENT)
Dept: FAMILY MEDICINE | Facility: CLINIC | Age: 55
End: 2020-01-14

## 2020-01-14 DIAGNOSIS — I50.23 ACUTE ON CHRONIC SYSTOLIC CONGESTIVE HEART FAILURE (H): ICD-10-CM

## 2020-01-14 DIAGNOSIS — R06.09 DOE (DYSPNEA ON EXERTION): ICD-10-CM

## 2020-01-14 RX ORDER — ALBUTEROL SULFATE 90 UG/1
1-2 AEROSOL, METERED RESPIRATORY (INHALATION) EVERY 4 HOURS PRN
Qty: 6.7 G | Refills: 0 | Status: SHIPPED | OUTPATIENT
Start: 2020-01-14 | End: 2020-01-30

## 2020-01-14 NOTE — TELEPHONE ENCOUNTER
HP TC unable to document on triage call - writer is noting patient has been called and message left advising patient of rx approval

## 2020-01-14 NOTE — TELEPHONE ENCOUNTER
"States doesn't have a vehicle cannot get in till next week - patient states he uses an inhaler every 2 weeks - so had used the 6 inhalers prescribed -however writer doesn't see large amount of inhalers being prescribed in his chart  states \"sore throat\" - feels his throat is sore because of air being dry due to putting in new furnace - has been checking temperature no fever    He never took tamiflu prophylactically and never developed influenza symptoms    He denies worsening heart failure symptoms and denies further triage - weight gain, edema, worsening breathing/SOB    Says if rx is prescribed he will follow up next week - requesting a call back for rx approval          "

## 2020-01-14 NOTE — TELEPHONE ENCOUNTER
Patient states that he's sick & needs his inhaler refilled right away.    Appt scheduled on 1/22. Please advise. Thank you!    Tammie Feng

## 2020-01-14 NOTE — TELEPHONE ENCOUNTER
"Requested Prescriptions   Pending Prescriptions Disp Refills     albuterol (PROAIR HFA/PROVENTIL HFA/VENTOLIN HFA) 108 (90 Base) MCG/ACT inhaler [Pharmacy Med Name: ALBUTEROL HFA INH (200 PUFFS) 18GM] 18 g      Sig: INHALE 2 PUFFS BY MOUTH EVERY 4 HOURS AS NEEDED FOR SHORTNESS OF BREATH OR DIFFICULT BREATHING OR WHEEZING       Asthma Maintenance Inhalers - Anticholinergics Passed - 1/14/2020  8:26 AM        Passed - Patient is age 12 years or older        Passed - Recent (12 mo) or future (30 days) visit within the authorizing provider's specialty     Patient has had an office visit with the authorizing provider or a provider within the authorizing providers department within the previous 12 mos or has a future within next 30 days. See \"Patient Info\" tab in inbasket, or \"Choose Columns\" in Meds & Orders section of the refill encounter.              Passed - Medication is active on med list        Writer notes 6 inhalers have been prescribed since 10/2019 - patient was given benson and no showed appointment - hx systolic heart failure, cardiomyopathy    Wt Readings from Last 5 Encounters:   11/20/19 77.1 kg (170 lb)   11/20/19 77.1 kg (170 lb)   10/15/19 79.8 kg (176 lb)   10/02/19 80.7 kg (178 lb)   09/25/19 77.6 kg (171 lb)         Office visit 10/2 (R06.09) JOHNSON (dyspnea on exertion)  Comment: chronic  Plan: albuterol (PROAIR HFA/PROVENTIL HFA/VENTOLIN         HFA) 108 (90 Base) MCG/ACT inhaler        I did go ahead and refill his albuterol today.  He will continue to use the medication as prescribed/prn.  He is to return to the clinic to see me any time if his JOHNSON escalates or if his wheezing is not improved with albuterol.    Tobacco Cessation:   reports that he has been smoking cigarettes. He has a 7.50 pack-year smoking history. He quit smokeless tobacco use about 7 years ago.  Tobacco Cessation Action Plan: Information offered: Patient not interested at this time      See cardiology notes - 11/20 - Cosme As " noted in previous notes patient does have significantly reduced ejection fraction around 10-15% and has been initially been doing progressively worse    He also notes that he has been using more albuterol and is concerned about it and was wondering if he could see pulmonary for Symbicort or other medication prescription.       He is worried, appropriately, about increased albuterol use and we put in a referral for the pulmonary clinic for further evaluation and possible management of his COPD/asthma.    Again we will continue to monitor him very closely and I will see him back in the next 3 months time.  He will let us know immediately should he develop any new symptoms or new issues.  Continue to discourage opioid use for pain control given tolerance and side effect profiles.

## 2020-01-28 DIAGNOSIS — R06.09 DOE (DYSPNEA ON EXERTION): ICD-10-CM

## 2020-01-28 DIAGNOSIS — I50.23 ACUTE ON CHRONIC SYSTOLIC CONGESTIVE HEART FAILURE (H): ICD-10-CM

## 2020-01-28 NOTE — TELEPHONE ENCOUNTER
"Requested Prescriptions   Pending Prescriptions Disp Refills     albuterol (PROAIR HFA/PROVENTIL HFA/VENTOLIN HFA) 108 (90 Base) MCG/ACT inhaler [Pharmacy Med Name: ALBUTEROL HFA INH (200 PUFFS) 18GM]  This maybe a DUPLICATE.   Last Written Prescription Date:  1-14-20  Last Fill Quantity: 6.7 g,  # refills: 0   Last office visit: 10/15/2019 with prescribing provider:  Denise White   Future Office Visit:   Next 5 appointments (look out 90 days)    Jan 30, 2020 10:00 AM CST  Office Visit with VERONICA Hallman CNP  [facility] [address] [place] [address] [phone]       18 g      Sig: INHALE 1 TO 2 PUFFS INTO THE LUNGS EVERY 4 HOURS AS NEEDED FOR SHORTNESS OF BREATH OR DIFFICULT BREATHING OR WHEEZING       Asthma Maintenance Inhalers - Anticholinergics Passed - 1/28/2020  3:36 AM        Passed - Patient is age 12 years or older        Passed - Recent (12 mo) or future (30 days) visit within the authorizing provider's specialty     Patient has had an office visit with the authorizing provider or a provider within the authorizing providers department within the previous 12 mos or has a future within next 30 days. See \"Patient Info\" tab in inbasket, or \"Choose Columns\" in Meds & Orders section of the refill encounter.            Passed - Medication is active on med list         "

## 2020-01-30 ENCOUNTER — OFFICE VISIT (OUTPATIENT)
Dept: FAMILY MEDICINE | Facility: CLINIC | Age: 55
End: 2020-01-30
Payer: COMMERCIAL

## 2020-01-30 VITALS
HEIGHT: 67 IN | BODY MASS INDEX: 28.56 KG/M2 | RESPIRATION RATE: 16 BRPM | WEIGHT: 182 LBS | DIASTOLIC BLOOD PRESSURE: 80 MMHG | SYSTOLIC BLOOD PRESSURE: 108 MMHG | OXYGEN SATURATION: 99 % | TEMPERATURE: 97.3 F | HEART RATE: 84 BPM

## 2020-01-30 DIAGNOSIS — Z87.891 PERSONAL HISTORY OF TOBACCO USE, PRESENTING HAZARDS TO HEALTH: ICD-10-CM

## 2020-01-30 DIAGNOSIS — N18.30 CKD (CHRONIC KIDNEY DISEASE) STAGE 3, GFR 30-59 ML/MIN (H): ICD-10-CM

## 2020-01-30 DIAGNOSIS — I50.23 ACUTE ON CHRONIC SYSTOLIC CONGESTIVE HEART FAILURE (H): ICD-10-CM

## 2020-01-30 DIAGNOSIS — Z23 NEED FOR VACCINATION: ICD-10-CM

## 2020-01-30 DIAGNOSIS — R91.8 PULMONARY NODULES: ICD-10-CM

## 2020-01-30 DIAGNOSIS — I50.22 CHRONIC SYSTOLIC HEART FAILURE (H): Primary | ICD-10-CM

## 2020-01-30 DIAGNOSIS — Z12.11 SCREENING FOR COLON CANCER: ICD-10-CM

## 2020-01-30 DIAGNOSIS — N52.9 ERECTILE DYSFUNCTION, UNSPECIFIED ERECTILE DYSFUNCTION TYPE: ICD-10-CM

## 2020-01-30 DIAGNOSIS — R06.09 DOE (DYSPNEA ON EXERTION): ICD-10-CM

## 2020-01-30 DIAGNOSIS — M54.41 ACUTE RIGHT-SIDED LOW BACK PAIN WITH RIGHT-SIDED SCIATICA: ICD-10-CM

## 2020-01-30 PROCEDURE — 99214 OFFICE O/P EST MOD 30 MIN: CPT | Mod: 25 | Performed by: NURSE PRACTITIONER

## 2020-01-30 PROCEDURE — 90732 PPSV23 VACC 2 YRS+ SUBQ/IM: CPT | Performed by: NURSE PRACTITIONER

## 2020-01-30 PROCEDURE — 90471 IMMUNIZATION ADMIN: CPT | Performed by: NURSE PRACTITIONER

## 2020-01-30 RX ORDER — SILDENAFIL 50 MG/1
50 TABLET, FILM COATED ORAL DAILY PRN
Qty: 20 TABLET | Refills: 5 | Status: SHIPPED | OUTPATIENT
Start: 2020-01-30 | End: 2020-01-30

## 2020-01-30 RX ORDER — ALBUTEROL SULFATE 90 UG/1
AEROSOL, METERED RESPIRATORY (INHALATION)
Qty: 0.1 G | Refills: 0 | OUTPATIENT
Start: 2020-01-30

## 2020-01-30 RX ORDER — ALBUTEROL SULFATE 90 UG/1
1-2 AEROSOL, METERED RESPIRATORY (INHALATION) EVERY 4 HOURS PRN
Qty: 6.7 G | Refills: 11 | Status: SHIPPED | OUTPATIENT
Start: 2020-01-30 | End: 2020-07-30

## 2020-01-30 RX ORDER — SILDENAFIL 50 MG/1
50 TABLET, FILM COATED ORAL DAILY PRN
Qty: 20 TABLET | Refills: 5 | Status: SHIPPED | OUTPATIENT
Start: 2020-01-30 | End: 2020-06-01

## 2020-01-30 ASSESSMENT — MIFFLIN-ST. JEOR: SCORE: 1634.18

## 2020-01-30 NOTE — NURSING NOTE
Prior to immunization administration, verified patients identity using patient s name and date of birth. Please see Immunization Activity for additional information.     Screening Questionnaire for Adult Immunization    Are you sick today?   No   Do you have allergies to medications, food, a vaccine component or latex?   No   Have you ever had a serious reaction after receiving a vaccination?   No   Do you have a long-term health problem with heart, lung, kidney, or metabolic disease (e.g., diabetes), asthma, a blood disorder, no spleen, complement component deficiency, a cochlear implant, or a spinal fluid leak?  Are you on long-term aspirin therapy?   No   Do you have cancer, leukemia, HIV/AIDS, or any other immune system problem?   No   Do you have a parent, brother, or sister with an immune system problem?   No   In the past 3 months, have you taken medications that affect  your immune system, such as prednisone, other steroids, or anticancer drugs; drugs for the treatment of rheumatoid arthritis, Crohn s disease, or psoriasis; or have you had radiation treatments?   No   Have you had a seizure, or a brain or other nervous system problem?   No   During the past year, have you received a transfusion of blood or blood    products, or been given immune (gamma) globulin or antiviral drug?   No   For women: Are you pregnant or is there a chance you could become       pregnant during the next month?   No   Have you received any vaccinations in the past 4 weeks?   No     Immunization questionnaire answers were all negative.        Per orders of Dr. White, injection of Pneumo-23 given by Anali Gruber MA. Patient instructed to remain in clinic for 15 minutes afterwards, and to report any adverse reaction to me immediately.       Screening performed by Anali Gruber MA on 1/30/2020 at 10:47 AM.

## 2020-01-30 NOTE — PROGRESS NOTES
"Subjective     Akshat Fragoso is a 54 year old male who presents to clinic today for the following health issues:    HPI     Chief Complaint   Patient presents with     Refill Request     for albuterol inhaler and percocet     Referral     to pulmonology       Akshat reports that he has been traveling back and forth from [place] to settle his daughter's estate.  Selling her horses and dealing with her belongings.  He has missed several appointments due to these travels.      Appointment upcoming with the pain clinic on 2/6/2020.    Shortness of breath:  He has been using his albuterol inhaler as much as once a day for SOB.  He has wheezing.  The albuterol helps.  He smokes on and off.  \"in the last 2 months I have probably smoked a pack of cigarettes.\"  Some concerns about COPD.  He has been told to follow up with pulmonology but he has not had time to do that yet due to his travels.    Pain:  Low back pain and problems.  Upcoming appointment with the pain management group.  He is requesting some percocet tablets to get him through to that appointment.        Patient Active Problem List   Diagnosis     CHF (congestive heart failure) (H)     Cardiomyopathy, nonischemic (H)     Pulmonary nodules, next CT due 1/2018     Personal history of tobacco use, presenting hazards to health     ICD (implantable cardioverter-defibrillator) in place- [employer], single chamber- NOT dependent     Chronic systolic heart failure (H)     JOHNSON (dyspnea on exertion)     Acute on chronic systolic congestive heart failure (H)     Hypertrophic nonobstructive cardiomyopathy (H)     Other ill-defined heart diseases     Erectile dysfunction, unspecified erectile dysfunction type     CKD (chronic kidney disease) stage 3, GFR 30-59 ml/min (H)     Past Surgical History:   Procedure Laterality Date     CARDIAC SURGERY  2016    defibrillator placement     CV RIGHT HEART CATH N/A 11/20/2019    Procedure: CV RIGHT HEART CATH;  Surgeon: " Jeff Aguilar MD;  Location:  HEART CARDIAC CATH LAB     None         Social History     Tobacco Use     Smoking status: Light Tobacco Smoker     Packs/day: 0.25     Years: 15.00     Pack years: 3.75     Types: Cigarettes     Smokeless tobacco: Former User     Quit date: 10/24/2011   Substance Use Topics     Alcohol use: No     Alcohol/week: 0.0 standard drinks     Family History   Problem Relation Age of Onset     Anorexia nervosa Daughter      Other - See Comments Granddaughter         premature birth         Current Outpatient Medications   Medication Sig Dispense Refill     albuterol (PROAIR HFA/PROVENTIL HFA/VENTOLIN HFA) 108 (90 Base) MCG/ACT inhaler Inhale 1-2 puffs into the lungs every 4 hours as needed for shortness of breath / dyspnea or wheezing 6.7 g 11     aspirin 81 MG tablet Take 81 mg by mouth daily       Blood Pressure Monitor KIT 1 kit daily 1 kit 0     carvedilol (COREG) 12.5 MG tablet Take 1 tablet (12.5 mg) by mouth 2 times daily (with meals) 180 tablet 3     furosemide (LASIX) 20 MG tablet Take 1 tablet (20 mg) by mouth 2 times daily Only take if weight up by 3lbs 180 tablet 3     lisinopril (PRINIVIL/ZESTRIL) 5 MG tablet Take 1 tablet (5 mg) by mouth 2 times daily 180 tablet 3     sildenafil (VIAGRA) 50 MG tablet Take 1 tablet (50 mg) by mouth daily as needed (For ED) 20 tablet 5     spironolactone (ALDACTONE) 25 MG tablet Take 0.5 tablets (12.5 mg) by mouth daily 45 tablet 3     Allergies   Allergen Reactions     No Known Allergies      Recent Labs   Lab Test 11/20/19  0835 05/07/19  1549 12/04/18  0926 11/19/18  0746  01/10/17  0718  01/09/17  1001   A1C  --   --  5.5  --   --   --   --   --    LDL  --   --   --  70  --  64  --   --    HDL  --   --   --  38*  --  39*  --   --    TRIG  --   --   --  282*  --  103  --   --    ALT 22  --   --  25  --   --   --  111*   CR 0.90 0.94  --  1.35*   < > 0.90   < > 0.85   GFRESTIMATED >90 >90  --  56*   < > 90   < > >90  Non   "GFR Calc     GFRESTBLACK >90 >90  --  67   < > >90   GFR Calc     < > >90   GFR Calc     POTASSIUM 4.5 4.4  --  4.0   < > 4.4  --  4.9   TSH  --   --   --  1.29  --   --   --  1.34    < > = values in this interval not displayed.      BP Readings from Last 3 Encounters:   01/30/20 108/80   11/20/19 116/73   11/20/19 112/71    Wt Readings from Last 3 Encounters:   01/30/20 82.6 kg (182 lb)   11/20/19 77.1 kg (170 lb)   11/20/19 77.1 kg (170 lb)           Reviewed and updated as needed this visit by Provider  Tobacco  Fam Hx         Review of Systems   ROS COMP: Constitutional, HEENT, cardiovascular, pulmonary, GI, , musculoskeletal, neuro, skin, endocrine and psych systems are negative, except as otherwise noted.      Objective    /80 (BP Location: Right arm, Patient Position: Sitting, Cuff Size: Adult Regular)   Pulse 84   Temp 97.3  F (36.3  C) (Tympanic)   Resp 16   Ht 1.702 m (5' 7\")   Wt 82.6 kg (182 lb)   SpO2 99%   BMI 28.51 kg/m    Body mass index is 28.51 kg/m .  Physical Exam   GENERAL: healthy, alert and no distress  EYES: Eyes grossly normal to inspection, PERRL and conjunctivae and sclerae normal  NECK: no adenopathy and thyroid normal to palpation  RESP: lungs clear to auscultation - no rales, rhonchi or wheezes  CV: regular rate and rhythm, normal S1 S2, no S3 or S4, no murmur, click or rub, no peripheral edema and peripheral pulses strong  MS: no gross musculoskeletal defects noted, no edema.  SKIN: warm and dry  NEURO: Normal strength and tone, mentation intact and speech normal  PSYCH: mentation appears normal, affect normal/bright      Diagnostic Test Results:  Labs reviewed in Epic        Assessment & Plan     (I50.22) Chronic systolic heart failure (H)  (primary encounter diagnosis)  Comment:   Plan: PULMONARY MEDICINE REFERRAL        I do lengthy discussion with Akshat today about his history of heart failure, needing albuterol inhalers for shortness " "of breath, and he is at risk of COPD due to smoking history.  I gave him a referral to pulmonology.  He is follow-up with pulmonology ASAP.  He is also to continue care with cardiology for his heart management.    (N18.3) CKD (chronic kidney disease) stage 3, GFR 30-59 ml/min (H)  Comment:   Plan: Reviewed today    (I50.23) Acute on chronic systolic congestive heart failure (H)  Comment:   Plan: albuterol (PROAIR HFA/PROVENTIL HFA/VENTOLIN         HFA) 108 (90 Base) MCG/ACT inhaler        As above    (N52.9) Erectile dysfunction, unspecified erectile dysfunction type  Comment:   Plan: sildenafil (VIAGRA) 50 MG tablet, DISCONTINUED:        sildenafil (VIAGRA) 50 MG tablet        Per the patient's request, I gave him refills of sildenafil.    (R06.09) JOHNSON (dyspnea on exertion)  Comment:   Plan: albuterol (PROAIR HFA/PROVENTIL HFA/VENTOLIN         HFA) 108 (90 Base) MCG/ACT inhaler        As above    (Z12.11) Screening for colon cancer  Comment: Routine  Plan: GASTROENTEROLOGY ADULT REF PROCEDURE ONLY         [facility] [phone]        Routine screening colonoscopy at earliest convenience    (Z23) Need for vaccination  Comment:   Plan: PPSV23, IM/SUBQ (2+ YRS) - [id number], ADMIN         1st VACCINE        Given today    (Z87.891) Personal history of tobacco use, presenting hazards to health  Comment:   Plan: CT Chest Low Dose Non Contrast        See above.      (R91.8) Pulmonary nodules, next CT due 1/2018  Comment:   Plan: CT Chest Low Dose Non Contrast        I recommend that he have a chest CT for pulmonary nodule management and review.    (M54.41) Acute right-sided low back pain with right-sided sciatica  Comment: Chronic  Plan:   11/20/2019 note from cardiologist, Dr. Aguiar, recommends \"to discourage opiod use for pain control given tolerance and side effect profiles.\"  I had a discussion with Akshat today that I will no longer prescribe narcotics for him.  He is to maintain his pain management with " other nonnarcotic strategies.  He is also to follow-up with the pain management group as he has scheduled.  He seems frustrated with this information but excepting.    See Patient Instructions    Return in about 6 months (around 7/30/2020) for Physical Exam, Medication follow up.    VERONICA [facility]

## 2020-01-30 NOTE — LETTER
January 31, 2020      Akshat Fragoso  [address] [address]        Dear Akshat,     As I reviewed your chart, you are due your CT scan of the chest for lung cancer screening and review of the lung nodule.  You can call [phone] to schedule the CT scan at your earliest convenience.    Let me know if you have any questions.      Sincerely,        VERONICA Doran CNP

## 2020-01-30 NOTE — TELEPHONE ENCOUNTER
Duplicate see script sent 1/30/2020 albuterol (PROAIR HFA/PROVENTIL HFA/VENTOLIN HFA) 108 (90 Base) MCG/ACT inhaler 6.7 g  x  11 refills

## 2020-02-04 ENCOUNTER — TELEPHONE (OUTPATIENT)
Dept: FAMILY MEDICINE | Facility: CLINIC | Age: 55
End: 2020-02-04

## 2020-02-04 NOTE — TELEPHONE ENCOUNTER
ONCOLOGY INTAKE: Records Information      APPT INFORMATION:  Referring provider:  Denise White APRN CNP  Referring provider s clinic:   [facility]  Reason for visit/diagnosis:  Pulmonary nodules and concerns with COPD per office visit 1/30/2020  Has patient been notified of appointment date and time?: no    RECORDS INFORMATION:  Were the records received with the referral (via [employer])? No, internal referral    Has patient been seen for any external appt for this diagnosis? no    If yes, where? na    Has patient had any imaging or procedures outside of [facility] for this condition? no      If Yes, where? na    ADDITIONAL INFORMATION:  LVM and Letter Sent

## 2020-02-09 ENCOUNTER — ANCILLARY PROCEDURE (OUTPATIENT)
Dept: CARDIOLOGY | Facility: CLINIC | Age: 55
End: 2020-02-09
Attending: INTERNAL MEDICINE
Payer: COMMERCIAL

## 2020-02-09 DIAGNOSIS — I42.8 CARDIOMYOPATHY, NONISCHEMIC (H): ICD-10-CM

## 2020-02-09 DIAGNOSIS — I42.8 CARDIOMYOPATHY, NONISCHEMIC (H): Primary | ICD-10-CM

## 2020-02-09 PROCEDURE — 93296 REM INTERROG EVL PM/IDS: CPT | Mod: ZF

## 2020-02-09 PROCEDURE — 99207 CARDIAC DEVICE CHECK - REMOTE: CPT | Mod: ZP | Performed by: INTERNAL MEDICINE

## 2020-02-10 DIAGNOSIS — I50.23 ACUTE ON CHRONIC SYSTOLIC CONGESTIVE HEART FAILURE (H): ICD-10-CM

## 2020-02-10 DIAGNOSIS — I50.22 CHRONIC SYSTOLIC HEART FAILURE (H): ICD-10-CM

## 2020-02-10 DIAGNOSIS — I42.2 HYPERTROPHIC NONOBSTRUCTIVE CARDIOMYOPATHY (H): ICD-10-CM

## 2020-02-10 DIAGNOSIS — R06.09 DOE (DYSPNEA ON EXERTION): ICD-10-CM

## 2020-02-10 RX ORDER — SPIRONOLACTONE 25 MG/1
12.5 TABLET ORAL DAILY
Qty: 45 TABLET | Refills: 3 | Status: SHIPPED | OUTPATIENT
Start: 2020-02-10 | End: 2020-11-03

## 2020-02-26 LAB
MDC_IDC_LEAD_IMPLANT_DT: NORMAL
MDC_IDC_LEAD_LOCATION: NORMAL
MDC_IDC_LEAD_LOCATION_DETAIL_1: NORMAL
MDC_IDC_LEAD_MFG: NORMAL
MDC_IDC_LEAD_MODEL: NORMAL
MDC_IDC_LEAD_POLARITY_TYPE: NORMAL
MDC_IDC_LEAD_SERIAL: NORMAL
MDC_IDC_MSMT_BATTERY_DTM: NORMAL
MDC_IDC_MSMT_BATTERY_REMAINING_LONGEVITY: 144 MO
MDC_IDC_MSMT_BATTERY_REMAINING_PERCENTAGE: 100 %
MDC_IDC_MSMT_BATTERY_STATUS: NORMAL
MDC_IDC_MSMT_CAP_CHARGE_DTM: NORMAL
MDC_IDC_MSMT_CAP_CHARGE_TIME: 9.9 S
MDC_IDC_MSMT_CAP_CHARGE_TYPE: NORMAL
MDC_IDC_MSMT_LEADCHNL_RV_IMPEDANCE_VALUE: 481 OHM
MDC_IDC_MSMT_LEADCHNL_RV_PACING_THRESHOLD_AMPLITUDE: 1 V
MDC_IDC_MSMT_LEADCHNL_RV_PACING_THRESHOLD_PULSEWIDTH: 0.4 MS
MDC_IDC_PG_IMPLANT_DTM: NORMAL
MDC_IDC_PG_MFG: NORMAL
MDC_IDC_PG_MODEL: NORMAL
MDC_IDC_PG_SERIAL: NORMAL
MDC_IDC_PG_TYPE: NORMAL
MDC_IDC_SESS_CLINIC_NAME: NORMAL
MDC_IDC_SESS_DTM: NORMAL
MDC_IDC_SESS_TYPE: NORMAL
MDC_IDC_SET_BRADY_LOWRATE: 40 {BEATS}/MIN
MDC_IDC_SET_BRADY_MODE: NORMAL
MDC_IDC_SET_LEADCHNL_RV_PACING_AMPLITUDE: 2.4 V
MDC_IDC_SET_LEADCHNL_RV_PACING_POLARITY: NORMAL
MDC_IDC_SET_LEADCHNL_RV_PACING_PULSEWIDTH: 0.4 MS
MDC_IDC_SET_LEADCHNL_RV_SENSING_ADAPTATION_MODE: NORMAL
MDC_IDC_SET_LEADCHNL_RV_SENSING_POLARITY: NORMAL
MDC_IDC_SET_LEADCHNL_RV_SENSING_SENSITIVITY: 0.6 MV
MDC_IDC_SET_ZONE_DETECTION_INTERVAL: 300 MS
MDC_IDC_SET_ZONE_DETECTION_INTERVAL: 353 MS
MDC_IDC_SET_ZONE_TYPE: NORMAL
MDC_IDC_SET_ZONE_TYPE: NORMAL
MDC_IDC_SET_ZONE_VENDOR_TYPE: NORMAL
MDC_IDC_SET_ZONE_VENDOR_TYPE: NORMAL
MDC_IDC_STAT_BRADY_DTM_END: NORMAL
MDC_IDC_STAT_BRADY_DTM_START: NORMAL
MDC_IDC_STAT_BRADY_RV_PERCENT_PACED: 0 %
MDC_IDC_STAT_EPISODE_RECENT_COUNT: 0
MDC_IDC_STAT_EPISODE_RECENT_COUNT_DTM_END: NORMAL
MDC_IDC_STAT_EPISODE_RECENT_COUNT_DTM_START: NORMAL
MDC_IDC_STAT_EPISODE_TYPE: NORMAL
MDC_IDC_STAT_EPISODE_VENDOR_TYPE: NORMAL
MDC_IDC_STAT_TACHYTHERAPY_ATP_DELIVERED_RECENT: 0
MDC_IDC_STAT_TACHYTHERAPY_ATP_DELIVERED_TOTAL: 0
MDC_IDC_STAT_TACHYTHERAPY_RECENT_DTM_END: NORMAL
MDC_IDC_STAT_TACHYTHERAPY_RECENT_DTM_START: NORMAL
MDC_IDC_STAT_TACHYTHERAPY_SHOCKS_ABORTED_RECENT: 0
MDC_IDC_STAT_TACHYTHERAPY_SHOCKS_ABORTED_TOTAL: 0
MDC_IDC_STAT_TACHYTHERAPY_SHOCKS_DELIVERED_RECENT: 0
MDC_IDC_STAT_TACHYTHERAPY_SHOCKS_DELIVERED_TOTAL: 0
MDC_IDC_STAT_TACHYTHERAPY_TOTAL_DTM_END: NORMAL
MDC_IDC_STAT_TACHYTHERAPY_TOTAL_DTM_START: NORMAL

## 2020-03-14 NOTE — NURSING NOTE
"Chief Complaint   Patient presents with     RECHECK     2 week follow up NICM, discuss increasing Toprol-XL to 50 mg once a day. States feeling well, no new sx.       Initial /66 (BP Location: Right arm, Patient Position: Chair, Cuff Size: Adult Regular)  Pulse 81  SpO2 97% Estimated body mass index is 23.65 kg/(m^2) as calculated from the following:    Height as of 1/9/17: 5' 7\" (1.702 m).    Weight as of 1/25/17: 151 lb (68.5 kg)..  BP completed using cuff size: regular    Estrella August CMA    " 84

## 2020-03-24 ENCOUNTER — ANCILLARY PROCEDURE (OUTPATIENT)
Dept: CARDIOLOGY | Facility: CLINIC | Age: 55
End: 2020-03-24
Attending: NURSE PRACTITIONER
Payer: COMMERCIAL

## 2020-03-24 DIAGNOSIS — I42.8 CARDIOMYOPATHY, NONISCHEMIC (H): Primary | ICD-10-CM

## 2020-03-24 PROCEDURE — 93296 REM INTERROG EVL PM/IDS: CPT | Mod: ZF

## 2020-03-24 PROCEDURE — 93295 DEV INTERROG REMOTE 1/2/MLT: CPT | Performed by: INTERNAL MEDICINE

## 2020-03-26 DIAGNOSIS — R06.09 DOE (DYSPNEA ON EXERTION): ICD-10-CM

## 2020-03-26 DIAGNOSIS — I50.23 ACUTE ON CHRONIC SYSTOLIC CONGESTIVE HEART FAILURE (H): ICD-10-CM

## 2020-03-26 DIAGNOSIS — I42.2 HYPERTROPHIC NONOBSTRUCTIVE CARDIOMYOPATHY (H): ICD-10-CM

## 2020-03-26 DIAGNOSIS — I50.22 CHRONIC SYSTOLIC HEART FAILURE (H): ICD-10-CM

## 2020-03-26 RX ORDER — CARVEDILOL 12.5 MG/1
12.5 TABLET ORAL 2 TIMES DAILY WITH MEALS
Qty: 180 TABLET | Refills: 2 | Status: SHIPPED | OUTPATIENT
Start: 2020-03-26 | End: 2020-11-03

## 2020-03-26 RX ORDER — LISINOPRIL 5 MG/1
5 TABLET ORAL 2 TIMES DAILY
Qty: 180 TABLET | Refills: 2 | Status: SHIPPED | OUTPATIENT
Start: 2020-03-26 | End: 2021-03-31

## 2020-05-01 ENCOUNTER — TELEPHONE (OUTPATIENT)
Dept: FAMILY MEDICINE | Facility: CLINIC | Age: 55
End: 2020-05-01

## 2020-05-01 NOTE — TELEPHONE ENCOUNTER
Received forms on 4/22 & placed on PCPs desk.    Please advise if an appt is needed. Thanks!    Tammie BRANDON     [facility]

## 2020-05-02 ENCOUNTER — ANCILLARY PROCEDURE (OUTPATIENT)
Dept: CARDIOLOGY | Facility: CLINIC | Age: 55
End: 2020-05-02
Attending: INTERNAL MEDICINE
Payer: COMMERCIAL

## 2020-05-02 DIAGNOSIS — I42.8 CARDIOMYOPATHY, NONISCHEMIC (H): Primary | ICD-10-CM

## 2020-05-02 DIAGNOSIS — I42.8 CARDIOMYOPATHY, NONISCHEMIC (H): ICD-10-CM

## 2020-05-02 PROCEDURE — 99207 CARDIAC DEVICE CHECK - REMOTE: CPT | Mod: ZP | Performed by: INTERNAL MEDICINE

## 2020-05-04 LAB
MDC_IDC_EPISODE_DTM: NORMAL
MDC_IDC_EPISODE_ID: NORMAL
MDC_IDC_EPISODE_TYPE: NORMAL
MDC_IDC_LEAD_IMPLANT_DT: NORMAL
MDC_IDC_LEAD_LOCATION: NORMAL
MDC_IDC_LEAD_LOCATION_DETAIL_1: NORMAL
MDC_IDC_LEAD_MFG: NORMAL
MDC_IDC_LEAD_MODEL: NORMAL
MDC_IDC_LEAD_POLARITY_TYPE: NORMAL
MDC_IDC_LEAD_SERIAL: NORMAL
MDC_IDC_MSMT_BATTERY_DTM: NORMAL
MDC_IDC_MSMT_BATTERY_REMAINING_LONGEVITY: 144 MO
MDC_IDC_MSMT_BATTERY_REMAINING_PERCENTAGE: 100 %
MDC_IDC_MSMT_BATTERY_STATUS: NORMAL
MDC_IDC_MSMT_CAP_CHARGE_DTM: NORMAL
MDC_IDC_MSMT_CAP_CHARGE_TIME: 9.9 S
MDC_IDC_MSMT_CAP_CHARGE_TYPE: NORMAL
MDC_IDC_MSMT_LEADCHNL_RV_IMPEDANCE_VALUE: 465 OHM
MDC_IDC_MSMT_LEADCHNL_RV_PACING_THRESHOLD_AMPLITUDE: 1 V
MDC_IDC_MSMT_LEADCHNL_RV_PACING_THRESHOLD_PULSEWIDTH: 0.4 MS
MDC_IDC_PG_IMPLANT_DTM: NORMAL
MDC_IDC_PG_MFG: NORMAL
MDC_IDC_PG_MODEL: NORMAL
MDC_IDC_PG_SERIAL: NORMAL
MDC_IDC_PG_TYPE: NORMAL
MDC_IDC_SESS_CLINIC_NAME: NORMAL
MDC_IDC_SESS_DTM: NORMAL
MDC_IDC_SESS_TYPE: NORMAL
MDC_IDC_SET_BRADY_LOWRATE: 40 {BEATS}/MIN
MDC_IDC_SET_BRADY_MODE: NORMAL
MDC_IDC_SET_LEADCHNL_RV_PACING_AMPLITUDE: 2.4 V
MDC_IDC_SET_LEADCHNL_RV_PACING_POLARITY: NORMAL
MDC_IDC_SET_LEADCHNL_RV_PACING_PULSEWIDTH: 0.4 MS
MDC_IDC_SET_LEADCHNL_RV_SENSING_ADAPTATION_MODE: NORMAL
MDC_IDC_SET_LEADCHNL_RV_SENSING_POLARITY: NORMAL
MDC_IDC_SET_LEADCHNL_RV_SENSING_SENSITIVITY: 0.6 MV
MDC_IDC_SET_ZONE_DETECTION_INTERVAL: 300 MS
MDC_IDC_SET_ZONE_DETECTION_INTERVAL: 353 MS
MDC_IDC_SET_ZONE_TYPE: NORMAL
MDC_IDC_SET_ZONE_TYPE: NORMAL
MDC_IDC_SET_ZONE_VENDOR_TYPE: NORMAL
MDC_IDC_SET_ZONE_VENDOR_TYPE: NORMAL
MDC_IDC_STAT_BRADY_DTM_END: NORMAL
MDC_IDC_STAT_BRADY_DTM_START: NORMAL
MDC_IDC_STAT_BRADY_RV_PERCENT_PACED: 0 %
MDC_IDC_STAT_EPISODE_RECENT_COUNT: 0
MDC_IDC_STAT_EPISODE_RECENT_COUNT: 1
MDC_IDC_STAT_EPISODE_RECENT_COUNT: 1
MDC_IDC_STAT_EPISODE_RECENT_COUNT_DTM_END: NORMAL
MDC_IDC_STAT_EPISODE_RECENT_COUNT_DTM_START: NORMAL
MDC_IDC_STAT_EPISODE_TYPE: NORMAL
MDC_IDC_STAT_EPISODE_VENDOR_TYPE: NORMAL
MDC_IDC_STAT_TACHYTHERAPY_ATP_DELIVERED_RECENT: 0
MDC_IDC_STAT_TACHYTHERAPY_ATP_DELIVERED_TOTAL: 0
MDC_IDC_STAT_TACHYTHERAPY_RECENT_DTM_END: NORMAL
MDC_IDC_STAT_TACHYTHERAPY_RECENT_DTM_START: NORMAL
MDC_IDC_STAT_TACHYTHERAPY_SHOCKS_ABORTED_RECENT: 0
MDC_IDC_STAT_TACHYTHERAPY_SHOCKS_ABORTED_TOTAL: 0
MDC_IDC_STAT_TACHYTHERAPY_SHOCKS_DELIVERED_RECENT: 0
MDC_IDC_STAT_TACHYTHERAPY_SHOCKS_DELIVERED_TOTAL: 0
MDC_IDC_STAT_TACHYTHERAPY_TOTAL_DTM_END: NORMAL
MDC_IDC_STAT_TACHYTHERAPY_TOTAL_DTM_START: NORMAL

## 2020-05-27 DIAGNOSIS — N52.9 ERECTILE DYSFUNCTION, UNSPECIFIED ERECTILE DYSFUNCTION TYPE: ICD-10-CM

## 2020-06-01 RX ORDER — SILDENAFIL 50 MG/1
TABLET, FILM COATED ORAL
Qty: 20 TABLET | Refills: 0 | Status: SHIPPED | OUTPATIENT
Start: 2020-06-01 | End: 2020-06-20

## 2020-06-01 NOTE — TELEPHONE ENCOUNTER
Prescription approved per [facility] Refill Protocol.  Yumiko Rosales RN

## 2020-06-20 DIAGNOSIS — N52.9 ERECTILE DYSFUNCTION, UNSPECIFIED ERECTILE DYSFUNCTION TYPE: ICD-10-CM

## 2020-06-20 RX ORDER — SILDENAFIL 50 MG/1
TABLET, FILM COATED ORAL
Qty: 20 TABLET | Refills: 0 | Status: SHIPPED | OUTPATIENT
Start: 2020-06-20 | End: 2020-07-09

## 2020-06-20 NOTE — TELEPHONE ENCOUNTER
Medication is being filled for 1 time refill only due to:  Patient needs to be seen because Return in about 6 months (around 7/30/2020) for Physical Exam, Medication follow up..   Please CALL to schedule-not using my chart.  Yumiko Rosales RN

## 2020-06-24 NOTE — TELEPHONE ENCOUNTER
LM informing patient that RX was refilled. Patient is due for an annual physical & med check around 7/30 prior to additional refills given.     Tammie BRANDON     [facility]

## 2020-07-30 ENCOUNTER — VIRTUAL VISIT (OUTPATIENT)
Dept: FAMILY MEDICINE | Facility: OTHER | Age: 55
End: 2020-07-30
Payer: COMMERCIAL

## 2020-07-30 ENCOUNTER — VIRTUAL VISIT (OUTPATIENT)
Dept: FAMILY MEDICINE | Facility: CLINIC | Age: 55
End: 2020-07-30
Payer: COMMERCIAL

## 2020-07-30 ENCOUNTER — NURSE TRIAGE (OUTPATIENT)
Dept: NURSING | Facility: CLINIC | Age: 55
End: 2020-07-30

## 2020-07-30 VITALS — WEIGHT: 170 LBS | BODY MASS INDEX: 26.68 KG/M2 | HEIGHT: 67 IN

## 2020-07-30 DIAGNOSIS — N52.9 ERECTILE DYSFUNCTION, UNSPECIFIED ERECTILE DYSFUNCTION TYPE: ICD-10-CM

## 2020-07-30 DIAGNOSIS — M54.41 RIGHT-SIDED LOW BACK PAIN WITH RIGHT-SIDED SCIATICA, UNSPECIFIED CHRONICITY: Primary | ICD-10-CM

## 2020-07-30 DIAGNOSIS — R06.09 DOE (DYSPNEA ON EXERTION): ICD-10-CM

## 2020-07-30 DIAGNOSIS — I50.23 ACUTE ON CHRONIC SYSTOLIC CONGESTIVE HEART FAILURE (H): ICD-10-CM

## 2020-07-30 PROCEDURE — 99421 OL DIG E/M SVC 5-10 MIN: CPT | Performed by: PHYSICIAN ASSISTANT

## 2020-07-30 PROCEDURE — 99214 OFFICE O/P EST MOD 30 MIN: CPT | Mod: 95 | Performed by: NURSE PRACTITIONER

## 2020-07-30 RX ORDER — HYDROCODONE BITARTRATE AND ACETAMINOPHEN 5; 325 MG/1; MG/1
1 TABLET ORAL EVERY 6 HOURS PRN
Qty: 18 TABLET | Refills: 0 | Status: SHIPPED | OUTPATIENT
Start: 2020-07-30 | End: 2020-11-04

## 2020-07-30 RX ORDER — SILDENAFIL 50 MG/1
50 TABLET, FILM COATED ORAL DAILY PRN
Qty: 20 TABLET | Refills: 5 | Status: SHIPPED | OUTPATIENT
Start: 2020-07-30 | End: 2020-11-20

## 2020-07-30 RX ORDER — ALBUTEROL SULFATE 90 UG/1
1-2 AEROSOL, METERED RESPIRATORY (INHALATION) EVERY 4 HOURS PRN
Qty: 18 G | Refills: 11 | Status: SHIPPED | OUTPATIENT
Start: 2020-07-30 | End: 2020-12-07

## 2020-07-30 ASSESSMENT — MIFFLIN-ST. JEOR: SCORE: 1574.74

## 2020-07-30 NOTE — PROGRESS NOTES
"Akshat Fragoso is a 55 year old male who is being evaluated via a billable video visit.      The patient has been notified of following:     \"This video visit will be conducted via a call between you and your physician/provider. We have found that certain health care needs can be provided without the need for an in-person physical exam.  This service lets us provide the care you need with a video conversation.  If a prescription is necessary we can send it directly to your pharmacy.  If lab work is needed we can place an order for that and you can then stop by our lab to have the test done at a later time.    Video visits are billed at different rates depending on your insurance coverage.  Please reach out to your insurance provider with any questions.    If during the course of the call the physician/provider feels a video visit is not appropriate, you will not be charged for this service.\"    Patient has given verbal consent for Video visit? Yes  How would you like to obtain your AVS? Mail a copy  If you are dropped from the video visit, the video invite should be resent to: Other e-mail: [employer]  Will anyone else be joining your video visit? No    Subjective     Akshat Fragoso is a 55 year old male who presents today via video visit for the following health issues:    HPI    Chief Complaint   Patient presents with     Back Pain     Medication Request     Norco       Staying healthy.  No coronavirus illness.  Following coronavirus precautions.  He continues care with the cardiology group.    Albuterol:  Using intermittently.  Usually when he has SOB with activity with activity.    Sciatica flare up.  \"it feels like I am sitting on a baseball.\"  Low back pain with right sided sciatica.  Tylenol/ibuprofen aren't helpful.  He has a longstanding history of low back pain.  He has gone to therapy, muscle relaxants etc.  \" Today all I want is a few [place].\"      Video Start Time: 11:38 AM      Patient Active Problem List "   Diagnosis     CHF (congestive heart failure) (H)     Cardiomyopathy, nonischemic (H)     Pulmonary nodules, next CT due 1/2018     Personal history of tobacco use, presenting hazards to health     ICD (implantable cardioverter-defibrillator) in place- [employer], single chamber- NOT dependent     Chronic systolic heart failure (H)     JOHNSON (dyspnea on exertion)     Acute on chronic systolic congestive heart failure (H)     Hypertrophic nonobstructive cardiomyopathy (H)     Other ill-defined heart diseases     Erectile dysfunction, unspecified erectile dysfunction type     CKD (chronic kidney disease) stage 3, GFR 30-59 ml/min (H)     Past Surgical History:   Procedure Laterality Date     CARDIAC SURGERY  2016    defibrillator placement     CV RIGHT HEART CATH N/A 11/20/2019    Procedure: CV RIGHT HEART CATH;  Surgeon: Jeff Aguilar MD;  Location:  HEART CARDIAC CATH LAB     None         Social History     Tobacco Use     Smoking status: Light Tobacco Smoker     Packs/day: 0.25     Years: 15.00     Pack years: 3.75     Types: Cigarettes     Smokeless tobacco: Former User     Quit date: 10/24/2011   Substance Use Topics     Alcohol use: No     Alcohol/week: 0.0 standard drinks     Family History   Problem Relation Age of Onset     Anorexia nervosa Daughter      Other - See Comments Granddaughter         premature birth         Current Outpatient Medications   Medication Sig Dispense Refill     albuterol (PROAIR HFA/PROVENTIL HFA/VENTOLIN HFA) 108 (90 Base) MCG/ACT inhaler Inhale 1-2 puffs into the lungs every 4 hours as needed for shortness of breath / dyspnea or wheezing 18 g 11     aspirin 81 MG tablet Take 81 mg by mouth daily       Blood Pressure Monitor KIT 1 kit daily 1 kit 0     carvedilol (COREG) 12.5 MG tablet Take 1 tablet (12.5 mg) by mouth 2 times daily (with meals) 180 tablet 2     furosemide (LASIX) 20 MG tablet Take 1 tablet (20 mg) by mouth 2 times daily Only take if weight up by 3lbs  180 tablet 3     HYDROcodone-acetaminophen (NORCO) 5-325 MG tablet Take 1 tablet by mouth every 6 hours as needed for pain 18 tablet 0     lisinopril (ZESTRIL) 5 MG tablet Take 1 tablet (5 mg) by mouth 2 times daily 180 tablet 2     sildenafil (VIAGRA) 50 MG tablet Take 1 tablet (50 mg) by mouth daily as needed (ED) 20 tablet 5     spironolactone (ALDACTONE) 25 MG tablet Take 0.5 tablets (12.5 mg) by mouth daily 45 tablet 3     Allergies   Allergen Reactions     No Known Allergies      Recent Labs   Lab Test 11/20/19  0835 05/07/19  1549 12/04/18  0926 11/19/18  0746  01/10/17  0718  01/09/17  1001   A1C  --   --  5.5  --   --   --   --   --    LDL  --   --   --  70  --  64  --   --    HDL  --   --   --  38*  --  39*  --   --    TRIG  --   --   --  282*  --  103  --   --    ALT 22  --   --  25  --   --   --  111*   CR 0.90 0.94  --  1.35*   < > 0.90   < > 0.85   GFRESTIMATED >90 >90  --  56*   < > 90   < > >90  Non  GFR Calc     GFRESTBLACK >90 >90  --  67   < > >90   GFR Calc     < > >90   GFR Calc     POTASSIUM 4.5 4.4  --  4.0   < > 4.4  --  4.9   TSH  --   --   --  1.29  --   --   --  1.34    < > = values in this interval not displayed.      BP Readings from Last 3 Encounters:   01/30/20 108/80   11/20/19 116/73   11/20/19 112/71    Wt Readings from Last 3 Encounters:   07/30/20 77.1 kg (170 lb)   01/30/20 82.6 kg (182 lb)   11/20/19 77.1 kg (170 lb)              Reviewed and updated as needed this visit by Provider  Tobacco  Allergies  Meds  Problems  Med Hx  Surg Hx  Fam Hx         Review of Systems   Constitutional, HEENT, cardiovascular, pulmonary, GI, , musculoskeletal, neuro, skin, endocrine and psych systems are negative, except as otherwise noted.      Objective             Physical Exam     GENERAL: Healthy, alert and no distress  EYES: Eyes grossly normal to inspection.  No discharge or erythema, or obvious scleral/conjunctival  abnormalities.  RESP: No audible wheeze, cough, or visible cyanosis.  No visible retractions or increased work of breathing.    SKIN: Visible skin clear. No significant rash, abnormal pigmentation or lesions.  NEURO: Cranial nerves grossly intact.  Mentation and speech appropriate for age.  PSYCH: Mentation appears normal, affect normal/bright, judgement and insight intact, normal speech and appearance well-groomed.      Diagnostic Test Results:  Labs reviewed in Epic        Assessment & Plan     (M54.41) Right-sided low back pain with right-sided sciatica, unspecified chronicity  (primary encounter diagnosis)  Comment: Acute  Plan: HYDROcodone-acetaminophen (NORCO) 5-325 MG         tablet, [facility] REFERRAL        For today, I have approved a small supply of Norco for Akshat.  I reviewed with him our past history of narcotic overuse and misuse.  I told him I am extremely hesitant to prescribe any narcotics for him today, but I would approve a small supply/12  Tablets.  I told him I would not give him additional refills of this medication within the next 2 to 4 weeks.  He does need to follow-up with physical therapy ASAP.  In the event that physical therapy is not helpful his back pain continues, he will need to follow-up with orthopedics.  He declined a referral to Ortho today.      (I50.23) Acute on chronic systolic congestive heart failure (H)  Comment: Chronic  Plan: albuterol (PROAIR HFA/PROVENTIL HFA/VENTOLIN         HFA) 108 (90 Base) MCG/ACT inhaler        I did go ahead and refill his pro-air inhaler today.  He is to use his medication/inhaler as needed.  Continue care with cardiology.    (R06.00) JOHNSON (dyspnea on exertion)  Comment:   Plan: albuterol (PROAIR HFA/PROVENTIL HFA/VENTOLIN         HFA) 108 (90 Base) MCG/ACT inhaler        As above, continue care with cardiology.    (N52.9) Erectile dysfunction, unspecified erectile dysfunction type  Comment: Chronic  Plan: sildenafil (VIAGRA)  "50 MG tablet        As the appointment was concluding, the patient requested refills of his sildenafil.  The prescription was sent iN.         BMI:   Estimated body mass index is 26.63 kg/m  as calculated from the following:    Height as of this encounter: 1.702 m (5' 7\").    Weight as of this encounter: 77.1 kg (170 lb).   Weight management plan: Discussed healthy diet and exercise guidelines        See Patient Instructions    Return if symptoms worsen or fail to improve.    VERONICA Doran CNP  [facility]      Video-Visit Details    Type of service:  Video Visit    Video End Time:11:53a    Originating Location (pt. Location): Home    Distant Location (provider location):  [facility]     Platform used for Video Visit: AmWell    Return if symptoms worsen or fail to improve.       VERONICA Doran CNP        "

## 2020-07-31 NOTE — TELEPHONE ENCOUNTER
Cheryl calls in for her  Akshat.  He is a heart patient so they are concerned that he may have covid.   He has a cough and fever and has been exposed to someone with covid.  He would like to get tested. He will do [email or web address] but if that does not work he will call back in to get scheduled for an urgent care virtual visit or telephone visit.  They agree to this plan.   Reason for Disposition    [1] COVID-19 infection suspected by caller or triager AND [2] mild symptoms (cough, fever, or others) AND [3] no complications or SOB    Additional Information    Negative: SEVERE difficulty breathing (e.g., struggling for each breath, speaks in single words)    Negative: Difficult to awaken or acting confused (e.g., disoriented, slurred speech)    Negative: Bluish (or gray) lips or face now    Negative: Shock suspected (e.g., cold/pale/clammy skin, too weak to stand, low BP, rapid pulse)    Negative: Sounds like a life-threatening emergency to the triager    Negative: [1] COVID-19 exposure AND [2] no symptoms    Negative: COVID-19 and Breastfeeding, questions about    Negative: [1] Adult with possible COVID-19 symptoms AND [2] triager concerned about severity of symptoms or other causes    Negative: SEVERE or constant chest pain or pressure (Exception: mild central chest pain, present only when coughing)    Negative: MODERATE difficulty breathing (e.g., speaks in phrases, SOB even at rest, pulse 100-120)    Negative: Patient sounds very sick or weak to the triager    Negative: Fever present > 3 days (72 hours)    Negative: [1] Fever returns after gone for over 24 hours AND [2] symptoms worse or not improved    Negative: [1] Continuous (nonstop) coughing interferes with work or school AND [2] no improvement using cough treatment per protocol    Negative: MILD difficulty breathing (e.g., minimal/no SOB at rest, SOB with walking, pulse <100)    Negative: Chest pain or pressure    Negative: Fever > 103 F (39.4 C)     Negative: [1] Fever > 101 F (38.3 C) AND [2] age > 60    Negative: [1] Fever > 100.0 F (37.8 C) AND [2] bedridden (e.g., nursing home patient, CVA, chronic illness, recovering from surgery)    Negative: HIGH RISK patient (e.g., age > 64 years, diabetes, heart or lung disease, weak immune system)    Protocols used: CORONAVIRUS (COVID-19) DIAGNOSED OR [id number] 5.16.20

## 2020-07-31 NOTE — PROGRESS NOTES
"Date: 2020 20:21:25  Clinician: Ashlee Giraldo  Clinician NPI: [id number]  Patient: Akshat Fragoso  Patient : 1965  Patient Address: [address] [place] [id number]  Patient Phone: [phone]  Visit Protocol: URI  Patient Summary:  Akshat is a 55 year old ( : 1965 ) male who initiated a Visit for COVID-19 (Coronavirus) evaluation and screening. When asked the question \"Please sign me up to receive news, health information and promotions from [employer].\", Akshat responded \"No\".    Akshat states his symptoms started 1-2 days ago.   His symptoms consist of myalgia, a cough, and malaise. Akshat also feels feverish.   Symptom details     Cough: Akshat coughs a few times an hour and his cough is not more bothersome at night. Phlegm comes into his throat when he coughs. He does not believe his cough is caused by post-nasal drip. The color of the phlegm is clear.     Temperature: His current temperature is 99.3 degrees Fahrenheit.      Akshat denies having wheezing, nausea, teeth pain, ageusia, diarrhea, sore throat, anosmia, facial pain or pressure, nasal congestion, vomiting, rhinitis, ear pain, headache, and chills. He also denies having recent facial or sinus surgery in the past 60 days and taking antibiotic medication in the past month. He is not experiencing dyspnea.   Precipitating events  He has not recently been exposed to someone with influenza. Akshat has been in close contact with the following high risk individuals: adults 65 or older and immunocompromised people.   Pertinent COVID-19 (Coronavirus) information  In the past 14 days, Akshat has not worked in a congregate living setting.   He does not work or volunteer as healthcare worker or a  and does not work or volunteer in a healthcare facility.   Akshat also has not lived in a congregate living setting in the past 14 days. He does not live with a healthcare worker.   Akshat has not had a close contact with a laboratory-confirmed COVID-19 " patient within 14 days of symptom onset.   Pertinent medical history  Akshat does not need a return to work/school note.   Weight: 170 lbs   Akshat does not smoke or use smokeless tobacco.   Weight: 170 lbs    MEDICATIONS: furosemide oral, sildenafil oral, spironolactone-hydrochlorothiazide oral, Aspirin Low Dose oral, lisinopril-hydrochlorothiazide oral, carvedilol oral, ALLERGIES: NKDA  Clinician Response:  Dear Akshat,   Your symptoms show that you may have coronavirus (COVID-19). This illness can cause fever, cough and trouble breathing. Many people get a mild case and get better on their own. Some people can get very sick.  What should I do?  We would like to test you for this virus.   1. Please call [phone] to schedule your visit. Explain that you were referred by [facility] to have a COVID-19 test. Be ready to share your [facility] visit ID number.  The following will serve as your written order for this COVID Test, ordered by me, for the indication of suspected COVID [Z20.828]: The test will be ordered in [facility], our electronic health record, after you are scheduled. It will show as ordered and authorized by John Farmer MD.  Order: COVID-19 (Coronavirus) PCR for SYMPTOMATIC testing from [facility].      2. When it's time for your COVID test:  Stay at least 6 feet away from others. (If someone will drive you to your test, stay in the backseat, as far away from the  as you can.)   Cover your mouth and nose with a mask, tissue or washcloth.  Go straight to the testing site. Don't make any stops on the way there or back.      3.Starting now: Stay home and away from others (self-isolate) until:   You've had no fever---and no medicine that reduces fever---for 3 full days (72 hours). And...   Your other symptoms have gotten better. For example, your cough or breathing has improved. And...   At least 10 days have passed since your symptoms started.       During this time, don't leave the house except for testing or medical  "care.   Stay in your own room, even for meals. Use your own bathroom if you can.   Stay away from others in your home. No hugging, kissing or shaking hands. No visitors.  Don't go to work, school or anywhere else.    Clean \"high touch\" surfaces often (doorknobs, counters, handles, etc.). Use a household cleaning spray or wipes. You'll find a full list of  on the EPA website: www.epa.gov/pesticide-registration/list-n-disinfectants-use-against-sars-cov-2.   Cover your mouth and nose with a mask, tissue or washcloth to avoid spreading germs.  Wash your hands and face often. Use soap and water.  Caregivers in these groups are at risk for severe illness due to COVID-19:  o People 65 years and older  o People who live in a nursing home or long-term care facility  o People with chronic disease (lung, heart, cancer, diabetes, kidney, liver, immunologic)  o People who have a weakened immune system, including those who:   Are in cancer treatment  Take medicine that weakens the immune system, such as corticosteroids  Had a bone marrow or organ transplant  Have an immune deficiency  Have poorly controlled HIV or AIDS  Are obese (body mass index of 40 or higher)  Smoke regularly   o Caregivers should wear gloves while washing dishes, handling laundry and cleaning bedrooms and bathrooms.  o Use caution when washing and drying laundry: Don't shake dirty laundry, and use the warmest water setting that you can.  o For more tips, go to www.cdc.gov/coronavirus/2019-ncov/downloads/10Things.pdf.    4.Sign up for [employer]. We know it's scary to hear that you might have COVID-19. We want to track your symptoms to make sure you're okay over the next 2 weeks. Please look for an email from [employer]---this is a free, online program that we'll use to keep in touch. To sign up, follow the link in the email. Learn more at [email or web address]  How can I take care of myself?   Get lots of rest. Drink extra fluids " (unless a doctor has told you not to).   Take Tylenol (acetaminophen) for fever or pain. If you have liver or kidney problems, ask your family doctor if it's okay to take Tylenol.   Adults can take either:    650 mg (two 325 mg pills) every 4 to 6 hours, or...   1,000 mg (two 500 mg pills) every 8 hours as needed.    Note: Don't take more than 3,000 mg in one day. Acetaminophen is found in many medicines (both prescribed and over-the-counter medicines). Read all labels to be sure you don't take too much.   For children, check the Tylenol bottle for the right dose. The dose is based on the child's age or weight.    If you have other health problems (like cancer, heart failure, an organ transplant or severe kidney disease): Call your specialty clinic if you don't feel better in the next 2 days.       Know when to call 911. Emergency warning signs include:    Trouble breathing or shortness of breath Pain or pressure in the chest that doesn't go away Feeling confused like you haven't felt before, or not being able to wake up Bluish-colored lips or face.  Where can I get more information?   [facility] -- About COVID-19: [email or web address]   CDC -- What to Do If You're Sick: www.cdc.gov/coronavirus/2019-ncov/about/steps-when-sick.html   CDC -- Ending Home Isolation: www.cdc.gov/coronavirus/2019-ncov/hcp/disposition-in-home-patients.html   CDC -- Caring for Someone: www.cdc.gov/coronavirus/2019-ncov/if-you-are-sick/care-for-someone.html   [facility] -- Interim Guidance for Hospital Discharge to Home: www.health.[facility].[place]/diseases/coronavirus/hcp/hospdischarge.pdf   [facility] clinical trials (COVID-19 research studies): clinicalaffairs.[facility]/umn-clinical-trials    Below are the COVID-19 hotlines at the [place] Department of Health ([facility]). Interpreters are available.    For health questions: Call 728-216-0992 or 1-522.869.6411 (7 a.m. to 7 p.m.) For questions about schools and childcare: Call  140-374-1631 or 1-373.977.3297 (7 a.m. to 7 p.m.)    Diagnosis: Cough  Diagnosis ICD: R05

## 2020-08-01 ENCOUNTER — ANCILLARY PROCEDURE (OUTPATIENT)
Dept: CARDIOLOGY | Facility: CLINIC | Age: 55
End: 2020-08-01
Attending: NURSE PRACTITIONER
Payer: COMMERCIAL

## 2020-08-01 DIAGNOSIS — Z20.822 SUSPECTED COVID-19 VIRUS INFECTION: Primary | ICD-10-CM

## 2020-08-01 DIAGNOSIS — Z95.810 ICD (IMPLANTABLE CARDIOVERTER-DEFIBRILLATOR) IN PLACE: Primary | ICD-10-CM

## 2020-08-01 DIAGNOSIS — I42.8 CARDIOMYOPATHY, NONISCHEMIC (H): ICD-10-CM

## 2020-08-01 PROCEDURE — 93295 DEV INTERROG REMOTE 1/2/MLT: CPT | Performed by: INTERNAL MEDICINE

## 2020-08-01 PROCEDURE — 93296 REM INTERROG EVL PM/IDS: CPT | Mod: ZF

## 2020-08-06 LAB
MDC_IDC_EPISODE_DTM: NORMAL
MDC_IDC_EPISODE_ID: NORMAL
MDC_IDC_EPISODE_TYPE: NORMAL
MDC_IDC_LEAD_IMPLANT_DT: NORMAL
MDC_IDC_LEAD_LOCATION: NORMAL
MDC_IDC_LEAD_LOCATION_DETAIL_1: NORMAL
MDC_IDC_LEAD_MFG: NORMAL
MDC_IDC_LEAD_MODEL: NORMAL
MDC_IDC_LEAD_POLARITY_TYPE: NORMAL
MDC_IDC_LEAD_SERIAL: NORMAL
MDC_IDC_MSMT_BATTERY_DTM: NORMAL
MDC_IDC_MSMT_BATTERY_REMAINING_LONGEVITY: 144 MO
MDC_IDC_MSMT_BATTERY_REMAINING_PERCENTAGE: 100 %
MDC_IDC_MSMT_BATTERY_STATUS: NORMAL
MDC_IDC_MSMT_CAP_CHARGE_DTM: NORMAL
MDC_IDC_MSMT_CAP_CHARGE_TIME: 10 S
MDC_IDC_MSMT_CAP_CHARGE_TYPE: NORMAL
MDC_IDC_MSMT_LEADCHNL_RV_IMPEDANCE_VALUE: 461 OHM
MDC_IDC_MSMT_LEADCHNL_RV_PACING_THRESHOLD_AMPLITUDE: 1 V
MDC_IDC_MSMT_LEADCHNL_RV_PACING_THRESHOLD_PULSEWIDTH: 0.4 MS
MDC_IDC_PG_IMPLANT_DTM: NORMAL
MDC_IDC_PG_MFG: NORMAL
MDC_IDC_PG_MODEL: NORMAL
MDC_IDC_PG_SERIAL: NORMAL
MDC_IDC_PG_TYPE: NORMAL
MDC_IDC_SESS_CLINIC_NAME: NORMAL
MDC_IDC_SESS_DTM: NORMAL
MDC_IDC_SESS_TYPE: NORMAL
MDC_IDC_SET_BRADY_LOWRATE: 40 {BEATS}/MIN
MDC_IDC_SET_BRADY_MODE: NORMAL
MDC_IDC_SET_LEADCHNL_RV_PACING_AMPLITUDE: 2.4 V
MDC_IDC_SET_LEADCHNL_RV_PACING_POLARITY: NORMAL
MDC_IDC_SET_LEADCHNL_RV_PACING_PULSEWIDTH: 0.4 MS
MDC_IDC_SET_LEADCHNL_RV_SENSING_ADAPTATION_MODE: NORMAL
MDC_IDC_SET_LEADCHNL_RV_SENSING_POLARITY: NORMAL
MDC_IDC_SET_LEADCHNL_RV_SENSING_SENSITIVITY: 0.6 MV
MDC_IDC_SET_ZONE_DETECTION_INTERVAL: 300 MS
MDC_IDC_SET_ZONE_DETECTION_INTERVAL: 353 MS
MDC_IDC_SET_ZONE_TYPE: NORMAL
MDC_IDC_SET_ZONE_TYPE: NORMAL
MDC_IDC_SET_ZONE_VENDOR_TYPE: NORMAL
MDC_IDC_SET_ZONE_VENDOR_TYPE: NORMAL
MDC_IDC_STAT_BRADY_DTM_END: NORMAL
MDC_IDC_STAT_BRADY_DTM_START: NORMAL
MDC_IDC_STAT_BRADY_RV_PERCENT_PACED: 0 %
MDC_IDC_STAT_EPISODE_RECENT_COUNT: 0
MDC_IDC_STAT_EPISODE_RECENT_COUNT: 1
MDC_IDC_STAT_EPISODE_RECENT_COUNT: 1
MDC_IDC_STAT_EPISODE_RECENT_COUNT_DTM_END: NORMAL
MDC_IDC_STAT_EPISODE_RECENT_COUNT_DTM_START: NORMAL
MDC_IDC_STAT_EPISODE_TYPE: NORMAL
MDC_IDC_STAT_EPISODE_VENDOR_TYPE: NORMAL
MDC_IDC_STAT_TACHYTHERAPY_ATP_DELIVERED_RECENT: 0
MDC_IDC_STAT_TACHYTHERAPY_ATP_DELIVERED_TOTAL: 0
MDC_IDC_STAT_TACHYTHERAPY_RECENT_DTM_END: NORMAL
MDC_IDC_STAT_TACHYTHERAPY_RECENT_DTM_START: NORMAL
MDC_IDC_STAT_TACHYTHERAPY_SHOCKS_ABORTED_RECENT: 0
MDC_IDC_STAT_TACHYTHERAPY_SHOCKS_ABORTED_TOTAL: 0
MDC_IDC_STAT_TACHYTHERAPY_SHOCKS_DELIVERED_RECENT: 0
MDC_IDC_STAT_TACHYTHERAPY_SHOCKS_DELIVERED_TOTAL: 0
MDC_IDC_STAT_TACHYTHERAPY_TOTAL_DTM_END: NORMAL
MDC_IDC_STAT_TACHYTHERAPY_TOTAL_DTM_START: NORMAL

## 2020-08-19 DIAGNOSIS — M54.41 RIGHT-SIDED LOW BACK PAIN WITH RIGHT-SIDED SCIATICA, UNSPECIFIED CHRONICITY: ICD-10-CM

## 2020-08-19 RX ORDER — HYDROCODONE BITARTRATE AND ACETAMINOPHEN 5; 325 MG/1; MG/1
1 TABLET ORAL EVERY 6 HOURS PRN
Qty: 18 TABLET | Refills: 0 | OUTPATIENT
Start: 2020-08-19

## 2020-08-19 NOTE — TELEPHONE ENCOUNTER
Reason for Call:  Medication or medication refill:    Do you use a [facility]?  Name of the pharmacy and phone number for the current request:  [facility] on [address] - [phone]    Name of the medication requested: hydrocodone-acetaminophen    Other request: patient is asking if he could get another refill of the hydrocodone-acetaminophen for his sciatica nerve     Can we leave a detailed message on this number? YES    Phone number patient can be reached at: Cell number on file:    Telephone Information:   Mobile [phone]       Best Time: any    Call taken on 8/19/2020 at 11:22 AM by Cheryl Thomas

## 2020-08-20 NOTE — TELEPHONE ENCOUNTER
Patient is requesting the medication for     Hydrocodone-acetaminophen    [id number] - [place], [place]    He had a visit with Denise White last week, ADDIE

## 2020-08-21 NOTE — TELEPHONE ENCOUNTER
Per Denise White CNP office visit notes from last week  Absolutely no refills in next 2-4 weeks  Left message on voicemail that Denise White CNP will not be giving any refills for 4 weeks and that he should see SERENA asap    Thanks!     Alisa Hunter RN

## 2020-09-18 DIAGNOSIS — R06.09 DOE (DYSPNEA ON EXERTION): ICD-10-CM

## 2020-09-18 DIAGNOSIS — I50.23 ACUTE ON CHRONIC SYSTOLIC CONGESTIVE HEART FAILURE (H): ICD-10-CM

## 2020-09-18 DIAGNOSIS — I50.22 CHRONIC SYSTOLIC HEART FAILURE (H): ICD-10-CM

## 2020-09-18 DIAGNOSIS — I42.2 HYPERTROPHIC NONOBSTRUCTIVE CARDIOMYOPATHY (H): ICD-10-CM

## 2020-09-21 RX ORDER — FUROSEMIDE 20 MG
20 TABLET ORAL 2 TIMES DAILY
Qty: 60 TABLET | Refills: 1 | Status: SHIPPED | OUTPATIENT
Start: 2020-09-21 | End: 2022-02-02

## 2020-09-21 NOTE — TELEPHONE ENCOUNTER
furosemide (LASIX) 20 MG tablet    Take 1 tablet (20 mg) by mouth 2 times daily Only take if weight up by 3lbs     Last Written Prescription Date:  7/3/19  Last Fill Quantity: 180,   # refills: 3  Last Office Visit : 11/20/19  - we will continue to monitor him very closely and I will see him back in the next 3 months time  Future Office visit:  none    Refill to pharmacy.     Scheduling has been notified to contact the pt for appointment.

## 2020-10-21 ENCOUNTER — ANCILLARY PROCEDURE (OUTPATIENT)
Dept: CARDIOLOGY | Facility: CLINIC | Age: 55
End: 2020-10-21
Attending: INTERNAL MEDICINE
Payer: COMMERCIAL

## 2020-10-21 DIAGNOSIS — I42.9 CARDIOMYOPATHY (H): ICD-10-CM

## 2020-10-21 PROCEDURE — 93296 REM INTERROG EVL PM/IDS: CPT

## 2020-10-21 PROCEDURE — 93295 DEV INTERROG REMOTE 1/2/MLT: CPT | Performed by: INTERNAL MEDICINE

## 2020-10-22 LAB
MDC_IDC_EPISODE_DTM: NORMAL
MDC_IDC_EPISODE_ID: NORMAL
MDC_IDC_EPISODE_TYPE: NORMAL
MDC_IDC_LEAD_IMPLANT_DT: NORMAL
MDC_IDC_LEAD_LOCATION: NORMAL
MDC_IDC_LEAD_LOCATION_DETAIL_1: NORMAL
MDC_IDC_LEAD_MFG: NORMAL
MDC_IDC_LEAD_MODEL: NORMAL
MDC_IDC_LEAD_POLARITY_TYPE: NORMAL
MDC_IDC_LEAD_SERIAL: NORMAL
MDC_IDC_MSMT_BATTERY_DTM: NORMAL
MDC_IDC_MSMT_BATTERY_REMAINING_LONGEVITY: 144 MO
MDC_IDC_MSMT_BATTERY_REMAINING_PERCENTAGE: 100 %
MDC_IDC_MSMT_BATTERY_STATUS: NORMAL
MDC_IDC_MSMT_CAP_CHARGE_DTM: NORMAL
MDC_IDC_MSMT_CAP_CHARGE_TIME: 10 S
MDC_IDC_MSMT_CAP_CHARGE_TYPE: NORMAL
MDC_IDC_MSMT_LEADCHNL_RV_IMPEDANCE_VALUE: 436 OHM
MDC_IDC_MSMT_LEADCHNL_RV_PACING_THRESHOLD_AMPLITUDE: 1 V
MDC_IDC_MSMT_LEADCHNL_RV_PACING_THRESHOLD_PULSEWIDTH: 0.4 MS
MDC_IDC_PG_IMPLANT_DTM: NORMAL
MDC_IDC_PG_MFG: NORMAL
MDC_IDC_PG_MODEL: NORMAL
MDC_IDC_PG_SERIAL: NORMAL
MDC_IDC_PG_TYPE: NORMAL
MDC_IDC_SESS_CLINIC_NAME: NORMAL
MDC_IDC_SESS_DTM: NORMAL
MDC_IDC_SESS_TYPE: NORMAL
MDC_IDC_SET_BRADY_LOWRATE: 40 {BEATS}/MIN
MDC_IDC_SET_BRADY_MODE: NORMAL
MDC_IDC_SET_LEADCHNL_RV_PACING_AMPLITUDE: 2.4 V
MDC_IDC_SET_LEADCHNL_RV_PACING_POLARITY: NORMAL
MDC_IDC_SET_LEADCHNL_RV_PACING_PULSEWIDTH: 0.4 MS
MDC_IDC_SET_LEADCHNL_RV_SENSING_ADAPTATION_MODE: NORMAL
MDC_IDC_SET_LEADCHNL_RV_SENSING_POLARITY: NORMAL
MDC_IDC_SET_LEADCHNL_RV_SENSING_SENSITIVITY: 0.6 MV
MDC_IDC_SET_ZONE_DETECTION_INTERVAL: 300 MS
MDC_IDC_SET_ZONE_DETECTION_INTERVAL: 353 MS
MDC_IDC_SET_ZONE_TYPE: NORMAL
MDC_IDC_SET_ZONE_TYPE: NORMAL
MDC_IDC_SET_ZONE_VENDOR_TYPE: NORMAL
MDC_IDC_SET_ZONE_VENDOR_TYPE: NORMAL
MDC_IDC_STAT_BRADY_DTM_END: NORMAL
MDC_IDC_STAT_BRADY_DTM_START: NORMAL
MDC_IDC_STAT_BRADY_RV_PERCENT_PACED: 0 %
MDC_IDC_STAT_EPISODE_RECENT_COUNT: 0
MDC_IDC_STAT_EPISODE_RECENT_COUNT: 1
MDC_IDC_STAT_EPISODE_RECENT_COUNT: 4
MDC_IDC_STAT_EPISODE_RECENT_COUNT_DTM_END: NORMAL
MDC_IDC_STAT_EPISODE_RECENT_COUNT_DTM_START: NORMAL
MDC_IDC_STAT_EPISODE_TYPE: NORMAL
MDC_IDC_STAT_EPISODE_VENDOR_TYPE: NORMAL
MDC_IDC_STAT_TACHYTHERAPY_ATP_DELIVERED_RECENT: 0
MDC_IDC_STAT_TACHYTHERAPY_ATP_DELIVERED_TOTAL: 0
MDC_IDC_STAT_TACHYTHERAPY_RECENT_DTM_END: NORMAL
MDC_IDC_STAT_TACHYTHERAPY_RECENT_DTM_START: NORMAL
MDC_IDC_STAT_TACHYTHERAPY_SHOCKS_ABORTED_RECENT: 0
MDC_IDC_STAT_TACHYTHERAPY_SHOCKS_ABORTED_TOTAL: 0
MDC_IDC_STAT_TACHYTHERAPY_SHOCKS_DELIVERED_RECENT: 0
MDC_IDC_STAT_TACHYTHERAPY_SHOCKS_DELIVERED_TOTAL: 0
MDC_IDC_STAT_TACHYTHERAPY_TOTAL_DTM_END: NORMAL
MDC_IDC_STAT_TACHYTHERAPY_TOTAL_DTM_START: NORMAL

## 2020-10-29 DIAGNOSIS — I42.2 HYPERTROPHIC NONOBSTRUCTIVE CARDIOMYOPATHY (H): ICD-10-CM

## 2020-10-29 DIAGNOSIS — R06.09 DOE (DYSPNEA ON EXERTION): ICD-10-CM

## 2020-10-29 DIAGNOSIS — I50.22 CHRONIC SYSTOLIC HEART FAILURE (H): ICD-10-CM

## 2020-10-29 DIAGNOSIS — I50.23 ACUTE ON CHRONIC SYSTOLIC CONGESTIVE HEART FAILURE (H): ICD-10-CM

## 2020-10-30 ENCOUNTER — MYC REFILL (OUTPATIENT)
Dept: CARDIOLOGY | Facility: CLINIC | Age: 55
End: 2020-10-30

## 2020-10-30 DIAGNOSIS — I42.2 HYPERTROPHIC NONOBSTRUCTIVE CARDIOMYOPATHY (H): ICD-10-CM

## 2020-10-30 DIAGNOSIS — I50.22 CHRONIC SYSTOLIC HEART FAILURE (H): ICD-10-CM

## 2020-10-30 DIAGNOSIS — R06.09 DOE (DYSPNEA ON EXERTION): ICD-10-CM

## 2020-10-30 DIAGNOSIS — I50.23 ACUTE ON CHRONIC SYSTOLIC CONGESTIVE HEART FAILURE (H): ICD-10-CM

## 2020-10-30 RX ORDER — CARVEDILOL 12.5 MG/1
12.5 TABLET ORAL 2 TIMES DAILY WITH MEALS
Qty: 180 TABLET | Refills: 2 | OUTPATIENT
Start: 2020-10-30

## 2020-10-30 RX ORDER — CARVEDILOL 12.5 MG/1
12.5 TABLET ORAL 2 TIMES DAILY WITH MEALS
Qty: 180 TABLET | Refills: 2 | Status: CANCELLED | OUTPATIENT
Start: 2020-10-30

## 2020-10-30 RX ORDER — SPIRONOLACTONE 25 MG/1
12.5 TABLET ORAL DAILY
Qty: 45 TABLET | Refills: 3 | OUTPATIENT
Start: 2020-10-30

## 2020-10-30 RX ORDER — SPIRONOLACTONE 25 MG/1
12.5 TABLET ORAL DAILY
Qty: 45 TABLET | Refills: 3 | Status: CANCELLED | OUTPATIENT
Start: 2020-10-30

## 2020-10-30 NOTE — TELEPHONE ENCOUNTER
Pharmacy was called to verify need for refills:  Patient is out of refills much earlier than he should be:    Spironolactone last ordered 2- 45 tabs (3 month supply w/3 refills  Patient got full fills of 45 tabs on   3-4   3-26   6-23   9-25 (and is requesting a refill again a month later)     Carvedilol last ordered 3- for 180 tabs (3 month supply w/2 refills  Patient got full fills of 180 tabs on   3-28   6-23   9-6 (and is requesting again today - should have supply to last until December)    Sent to clinic to be reviewed with the patient.      Kathleen M Doege RN

## 2020-11-02 ENCOUNTER — MYC REFILL (OUTPATIENT)
Dept: CARDIOLOGY | Facility: CLINIC | Age: 55
End: 2020-11-02

## 2020-11-02 DIAGNOSIS — I50.22 CHRONIC SYSTOLIC HEART FAILURE (H): ICD-10-CM

## 2020-11-02 DIAGNOSIS — I50.23 ACUTE ON CHRONIC SYSTOLIC CONGESTIVE HEART FAILURE (H): ICD-10-CM

## 2020-11-02 DIAGNOSIS — I42.2 HYPERTROPHIC NONOBSTRUCTIVE CARDIOMYOPATHY (H): ICD-10-CM

## 2020-11-02 DIAGNOSIS — R06.09 DOE (DYSPNEA ON EXERTION): ICD-10-CM

## 2020-11-02 RX ORDER — CARVEDILOL 12.5 MG/1
12.5 TABLET ORAL 2 TIMES DAILY WITH MEALS
Qty: 180 TABLET | Refills: 2 | Status: CANCELLED | OUTPATIENT
Start: 2020-11-02

## 2020-11-03 ENCOUNTER — CARE COORDINATION (OUTPATIENT)
Dept: CARDIOLOGY | Facility: CLINIC | Age: 55
End: 2020-11-03

## 2020-11-03 DIAGNOSIS — I50.23 ACUTE ON CHRONIC SYSTOLIC CONGESTIVE HEART FAILURE (H): ICD-10-CM

## 2020-11-03 DIAGNOSIS — I42.2 HYPERTROPHIC NONOBSTRUCTIVE CARDIOMYOPATHY (H): ICD-10-CM

## 2020-11-03 DIAGNOSIS — I50.22 CHRONIC SYSTOLIC HEART FAILURE (H): ICD-10-CM

## 2020-11-03 DIAGNOSIS — R06.09 DOE (DYSPNEA ON EXERTION): ICD-10-CM

## 2020-11-03 RX ORDER — CARVEDILOL 12.5 MG/1
12.5 TABLET ORAL 2 TIMES DAILY WITH MEALS
Qty: 180 TABLET | Refills: 0 | Status: SHIPPED | OUTPATIENT
Start: 2020-11-03 | End: 2021-01-05

## 2020-11-03 RX ORDER — SPIRONOLACTONE 25 MG/1
12.5 TABLET ORAL DAILY
Qty: 45 TABLET | Refills: 0 | Status: SHIPPED | OUTPATIENT
Start: 2020-11-03 | End: 2021-02-02

## 2020-11-03 RX ORDER — SPIRONOLACTONE 25 MG/1
12.5 TABLET ORAL DAILY
Qty: 45 TABLET | Refills: 3 | OUTPATIENT
Start: 2020-11-03

## 2020-11-04 ENCOUNTER — VIRTUAL VISIT (OUTPATIENT)
Dept: FAMILY MEDICINE | Facility: CLINIC | Age: 55
End: 2020-11-04
Payer: COMMERCIAL

## 2020-11-04 DIAGNOSIS — M54.41 RIGHT-SIDED LOW BACK PAIN WITH RIGHT-SIDED SCIATICA, UNSPECIFIED CHRONICITY: ICD-10-CM

## 2020-11-04 PROCEDURE — 99213 OFFICE O/P EST LOW 20 MIN: CPT | Mod: 95 | Performed by: FAMILY MEDICINE

## 2020-11-04 RX ORDER — HYDROCODONE BITARTRATE AND ACETAMINOPHEN 5; 325 MG/1; MG/1
1 TABLET ORAL EVERY 6 HOURS PRN
Qty: 18 TABLET | Refills: 0 | Status: SHIPPED | OUTPATIENT
Start: 2020-11-04 | End: 2020-12-10

## 2020-11-04 NOTE — PROGRESS NOTES
"Akshat Fragoso is a 55 year old male who is being evaluated via a billable telephone visit.      The patient has been notified of following:     \"This telephone visit will be conducted via a call between you and your physician/provider. We have found that certain health care needs can be provided without the need for a physical exam.  This service lets us provide the care you need with a short phone conversation.  If a prescription is necessary we can send it directly to your pharmacy.  If lab work is needed we can place an order for that and you can then stop by our lab to have the test done at a later time.    Telephone visits are billed at different rates depending on your insurance coverage. During this emergency period, for some insurers they may be billed the same as an in-person visit.  Please reach out to your insurance provider with any questions.    If during the course of the call the physician/provider feels a telephone visit is not appropriate, you will not be charged for this service.\"    Patient has given verbal consent for Telephone visit?  Yes    What phone number would you like to be contacted at? Cell phone     How would you like to obtain your AVS? MyChart    Subjective     Akshat Fragoso is a 55 year old male who presents via phone visit today for the following health issues:    HPI     Chronic Pain Follow-Up      Chronic right low back pain with right sided sciatica. He feels that he is sitting on a baseball. He has intermittent flares and he uses heating pads, norco and ice packs. He was supposed to do virtual PT but can't do to no internet. He is currently not working. No fever, chills, urinary retention, bowel incontinence or saddle anesthesia.       Encounter-Level CSA:    There are no encounter-level csa.     Patient-Level CSA:    There are no patient-level csa.           Review of Systems   Constitutional, HEENT, cardiovascular, pulmonary, gi and gu systems are negative, except as otherwise " noted.       Objective          Vitals:  No vitals were obtained today due to virtual visit.    healthy, alert and no distress  PSYCH: Alert and oriented times 3; coherent speech, normal   rate and volume, able to articulate logical thoughts, able   to abstract reason, no tangential thoughts, no hallucinations   or delusions  His affect is normal  RESP: No cough, no audible wheezing, able to talk in full sentences  Remainder of exam unable to be completed due to telephone visits    No results found for this or any previous visit (from the past 24 hour(s)).        Assessment/Plan:      1. Right-sided low back pain with right-sided sciatica, unspecified chronicity  - Reviewed PCP's previous note, he was given short supply July 2020 and was advised to start PT. Due to job loss, pt wasn't able to do virtual PT as he has no interenet. He notes worsening of symptoms. I did review [place]  and no suspicious activity. I refilled pts Norco and advised him to follow up with orthopedic provider. Referral placed.   - HYDROcodone-acetaminophen (NORCO) 5-325 MG tablet; Take 1 tablet by mouth every 6 hours as needed for pain  Dispense: 18 tablet; Refill: 0; no driving or taking with alcohol  - Orthopedic & Spine  Referral; Future    Phone call duration:  5 minutes

## 2020-12-07 DIAGNOSIS — N52.9 ERECTILE DYSFUNCTION, UNSPECIFIED ERECTILE DYSFUNCTION TYPE: ICD-10-CM

## 2020-12-07 DIAGNOSIS — M54.41 RIGHT-SIDED LOW BACK PAIN WITH RIGHT-SIDED SCIATICA, UNSPECIFIED CHRONICITY: ICD-10-CM

## 2020-12-07 RX ORDER — SILDENAFIL 50 MG/1
TABLET, FILM COATED ORAL
Qty: 20 TABLET | Refills: 0 | Status: SHIPPED | OUTPATIENT
Start: 2020-12-07 | End: 2020-12-10

## 2020-12-08 NOTE — TELEPHONE ENCOUNTER
"Denise-Please review and advise.  1. Patient had virtual visit with Dr. Sanabria on 11/4/20.     A. Could patient schedule virtual visit with you to address Norco refill and in the meantime schedule with Orthopedics for further evaluation?    \"1. Right-sided low back pain with right-sided sciatica, unspecified chronicity  - Reviewed PCP's previous note, he was given short supply July 2020 and was advised to start PT. Due to job loss, pt wasn't able to do virtual PT as he has no interenet. He notes worsening of symptoms. I did review [place]  and no suspicious activity. I refilled pts Norco and advised him to follow up with orthopedic provider. Referral placed.   - HYDROcodone-acetaminophen (NORCO) 5-325 MG tablet; Take 1 tablet by mouth every 6 hours as needed for pain  Dispense: 18 tablet; Refill: 0; no driving or taking with alcohol  - Orthopedic & Spine  Referral; Future\"    Thank you!  LAURA Peacock, BSN, RN    "

## 2020-12-08 NOTE — TELEPHONE ENCOUNTER
Patient is requesting refill for Hydrocodone to be sent to [facility] on [address]. Please inform patient once this has been sent. Thank You    Jaki Fragoso on 12/8/2020 at 12:42 PM

## 2020-12-09 RX ORDER — HYDROCODONE BITARTRATE AND ACETAMINOPHEN 5; 325 MG/1; MG/1
1 TABLET ORAL EVERY 6 HOURS PRN
Qty: 18 TABLET | Refills: 0 | OUTPATIENT
Start: 2020-12-09

## 2020-12-10 ENCOUNTER — VIRTUAL VISIT (OUTPATIENT)
Dept: FAMILY MEDICINE | Facility: CLINIC | Age: 55
End: 2020-12-10
Payer: COMMERCIAL

## 2020-12-10 DIAGNOSIS — M54.41 RIGHT-SIDED LOW BACK PAIN WITH RIGHT-SIDED SCIATICA, UNSPECIFIED CHRONICITY: ICD-10-CM

## 2020-12-10 DIAGNOSIS — I50.23 ACUTE ON CHRONIC SYSTOLIC CONGESTIVE HEART FAILURE (H): ICD-10-CM

## 2020-12-10 DIAGNOSIS — N52.9 ERECTILE DYSFUNCTION, UNSPECIFIED ERECTILE DYSFUNCTION TYPE: ICD-10-CM

## 2020-12-10 DIAGNOSIS — R06.09 DOE (DYSPNEA ON EXERTION): ICD-10-CM

## 2020-12-10 PROCEDURE — 99214 OFFICE O/P EST MOD 30 MIN: CPT | Mod: 95 | Performed by: NURSE PRACTITIONER

## 2020-12-10 RX ORDER — HYDROCODONE BITARTRATE AND ACETAMINOPHEN 5; 325 MG/1; MG/1
1 TABLET ORAL EVERY 6 HOURS PRN
Qty: 18 TABLET | Refills: 0 | Status: ON HOLD | OUTPATIENT
Start: 2020-12-10 | End: 2023-03-22

## 2020-12-10 RX ORDER — SILDENAFIL 50 MG/1
50 TABLET, FILM COATED ORAL DAILY PRN
Qty: 20 TABLET | Refills: 11 | Status: SHIPPED | OUTPATIENT
Start: 2020-12-10 | End: 2021-08-24

## 2020-12-10 RX ORDER — ALBUTEROL SULFATE 90 UG/1
1-2 AEROSOL, METERED RESPIRATORY (INHALATION) EVERY 4 HOURS PRN
Qty: 18 G | Refills: 11 | Status: SHIPPED | OUTPATIENT
Start: 2020-12-10 | End: 2021-01-12

## 2020-12-10 NOTE — PROGRESS NOTES
"Akshat Fragoso is a 55 year old male who is being evaluated via a billable telephone visit.      The patient has been notified of following:     \"This telephone visit will be conducted via a call between you and your physician/provider. We have found that certain health care needs can be provided without the need for a physical exam.  This service lets us provide the care you need with a short phone conversation.  If a prescription is necessary we can send it directly to your pharmacy.  If lab work is needed we can place an order for that and you can then stop by our lab to have the test done at a later time.    Telephone visits are billed at different rates depending on your insurance coverage. During this emergency period, for some insurers they may be billed the same as an in-person visit.  Please reach out to your insurance provider with any questions.    If during the course of the call the physician/provider feels a telephone visit is not appropriate, you will not be charged for this service.\"    Patient has given verbal consent for Telephone visit?  Yes    What phone number would you like to be contacted at? [phone] ([place])     How would you like to obtain your AVS? [place]    Subjective     Akshat Fragoso is a 55 year old male who presents via phone visit today for the following health issues:    HPI     Chronic Pain Follow-Up    Where in your body do you have pain? Right side sciatica pain   How has your pain affected your ability to work? Unable to work  Which of these pain treatments have you tried since your last clinic visit? Other: ibuprofen and advil   How well are you sleeping? Poor  How has your mood been since your last visit? About the same  Have you had a significant life event? No  Other aggravating factors: repetitive activities - walking or overdoing activity   Taking medication as directed? No: not on medication     No flowsheet data found.  No flowsheet data found.  No flowsheet data " "found.  Encounter-Level CSA:    There are no encounter-level csa.     Patient-Level CSA:    There are no patient-level csa.           Akshat has a history of chronic low back pain with radiation down his right leg.  \"it feels like I am sitting on a baseball. Even my belt hurts.\"    He was seen by Dr. Sanabria 11/4/2020 and received 18 norco tablets.  His pain continues.  He uses it intermjittently.  Ice, rest, exercise has not helped.  He is trying to stay active and maintain covid precautions.    Erectile dysfunction:  He is requesting refills today.  It works well.    Albuterol inhaler:  He uses intermittently.  He is requesting refiols today.       Review of Systems   Constitutional, HEENT, cardiovascular, pulmonary, GI, , musculoskeletal, neuro, skin, endocrine and psych systems are negative, except as otherwise noted.       Objective          Vitals:  No vitals were obtained today due to virtual visit.    healthy, alert and no distress  PSYCH: Alert and oriented times 3; coherent speech, normal   rate and volume, able to articulate logical thoughts, able   to abstract reason, no tangential thoughts, no hallucinations   or delusions  His affect is normal  RESP: No cough, no audible wheezing, able to talk in full sentences  Remainder of exam unable to be completed due to telephone visits    Diagnostics:  Reviewed in Epic.         Assessment/Plan:        (M54.41) Right-sided low back pain with right-sided sciatica, unspecified chronicity  Comment: Acute  Plan:   I had a conversation with Akshat today regarding his history of narcotic overuse and abuse, his prescription that he obtained from Dr. Vail a month ago, and continuing with narcotics.  For today, it does sound like he has an acute flareup but I told him I do not want to prescribe more medication for him.  I did approve a small supply today but he needs med check appointment with any refills.  He is to use this medication sparingly.  I warned him of the " overuse and abuse potential with the medication.  He promises he will not do that.        (R06.00) JOHNSON (dyspnea on exertion)  Comment: history of  Plan: albuterol (PROAIR HFA/PROVENTIL HFA/VENTOLIN         HFA) 108 (90 Base) MCG/ACT inhaler        Refills given    (I50.23) Acute on chronic systolic congestive heart failure (H)  Comment:   Plan: albuterol (PROAIR HFA/PROVENTIL HFA/VENTOLIN         HFA) 108 (90 Base) MCG/ACT inhaler        Refills given.  Continue care with the cardiac team.    (N52.9) Erectile dysfunction, unspecified erectile dysfunction type  Comment: Chronic  Plan: sildenafil (VIAGRA) 50 MG tablet        Refills given.  Continue to use as needed.             See Patient Instructions    Return if symptoms worsen or fail to improve, for Medication refills.    VERONICA [facility]    Phone call duration:  13 minutes

## 2020-12-12 ENCOUNTER — ANCILLARY PROCEDURE (OUTPATIENT)
Dept: CARDIOLOGY | Facility: CLINIC | Age: 55
End: 2020-12-12
Attending: INTERNAL MEDICINE
Payer: COMMERCIAL

## 2020-12-12 DIAGNOSIS — I50.22 CHRONIC SYSTOLIC HEART FAILURE (H): ICD-10-CM

## 2020-12-12 DIAGNOSIS — Z95.810 ICD (IMPLANTABLE CARDIOVERTER-DEFIBRILLATOR) IN PLACE: ICD-10-CM

## 2020-12-12 DIAGNOSIS — I50.22 CHRONIC SYSTOLIC HEART FAILURE (H): Primary | ICD-10-CM

## 2020-12-12 PROCEDURE — 93296 REM INTERROG EVL PM/IDS: CPT

## 2020-12-12 PROCEDURE — 99207 CARDIAC DEVICE CHECK - REMOTE: CPT | Performed by: INTERNAL MEDICINE

## 2020-12-22 LAB
MDC_IDC_EPISODE_DTM: NORMAL
MDC_IDC_EPISODE_DURATION: 14 S
MDC_IDC_EPISODE_ID: NORMAL
MDC_IDC_EPISODE_TYPE: NORMAL
MDC_IDC_EPISODE_VENDOR_TYPE: NORMAL
MDC_IDC_LEAD_IMPLANT_DT: NORMAL
MDC_IDC_LEAD_LOCATION: NORMAL
MDC_IDC_LEAD_LOCATION_DETAIL_1: NORMAL
MDC_IDC_LEAD_MFG: NORMAL
MDC_IDC_LEAD_MODEL: NORMAL
MDC_IDC_LEAD_POLARITY_TYPE: NORMAL
MDC_IDC_LEAD_SERIAL: NORMAL
MDC_IDC_MSMT_BATTERY_DTM: NORMAL
MDC_IDC_MSMT_BATTERY_REMAINING_LONGEVITY: 144 MO
MDC_IDC_MSMT_BATTERY_REMAINING_PERCENTAGE: 100 %
MDC_IDC_MSMT_BATTERY_STATUS: NORMAL
MDC_IDC_MSMT_CAP_CHARGE_DTM: NORMAL
MDC_IDC_MSMT_CAP_CHARGE_TIME: 10 S
MDC_IDC_MSMT_CAP_CHARGE_TYPE: NORMAL
MDC_IDC_MSMT_LEADCHNL_RV_IMPEDANCE_VALUE: 453 OHM
MDC_IDC_MSMT_LEADCHNL_RV_PACING_THRESHOLD_AMPLITUDE: 1 V
MDC_IDC_MSMT_LEADCHNL_RV_PACING_THRESHOLD_PULSEWIDTH: 0.4 MS
MDC_IDC_PG_IMPLANT_DTM: NORMAL
MDC_IDC_PG_MFG: NORMAL
MDC_IDC_PG_MODEL: NORMAL
MDC_IDC_PG_SERIAL: NORMAL
MDC_IDC_PG_TYPE: NORMAL
MDC_IDC_SESS_CLINIC_NAME: NORMAL
MDC_IDC_SESS_DTM: NORMAL
MDC_IDC_SESS_TYPE: NORMAL
MDC_IDC_SET_BRADY_LOWRATE: 40 {BEATS}/MIN
MDC_IDC_SET_BRADY_MODE: NORMAL
MDC_IDC_SET_LEADCHNL_RV_PACING_AMPLITUDE: 2.4 V
MDC_IDC_SET_LEADCHNL_RV_PACING_POLARITY: NORMAL
MDC_IDC_SET_LEADCHNL_RV_PACING_PULSEWIDTH: 0.4 MS
MDC_IDC_SET_LEADCHNL_RV_SENSING_ADAPTATION_MODE: NORMAL
MDC_IDC_SET_LEADCHNL_RV_SENSING_POLARITY: NORMAL
MDC_IDC_SET_LEADCHNL_RV_SENSING_SENSITIVITY: 0.6 MV
MDC_IDC_SET_ZONE_DETECTION_INTERVAL: 300 MS
MDC_IDC_SET_ZONE_DETECTION_INTERVAL: 353 MS
MDC_IDC_SET_ZONE_TYPE: NORMAL
MDC_IDC_SET_ZONE_TYPE: NORMAL
MDC_IDC_SET_ZONE_VENDOR_TYPE: NORMAL
MDC_IDC_SET_ZONE_VENDOR_TYPE: NORMAL
MDC_IDC_STAT_BRADY_DTM_END: NORMAL
MDC_IDC_STAT_BRADY_DTM_START: NORMAL
MDC_IDC_STAT_BRADY_RV_PERCENT_PACED: 0 %
MDC_IDC_STAT_EPISODE_RECENT_COUNT: 0
MDC_IDC_STAT_EPISODE_RECENT_COUNT: 1
MDC_IDC_STAT_EPISODE_RECENT_COUNT: 5
MDC_IDC_STAT_EPISODE_RECENT_COUNT_DTM_END: NORMAL
MDC_IDC_STAT_EPISODE_RECENT_COUNT_DTM_START: NORMAL
MDC_IDC_STAT_EPISODE_TYPE: NORMAL
MDC_IDC_STAT_EPISODE_VENDOR_TYPE: NORMAL
MDC_IDC_STAT_TACHYTHERAPY_ATP_DELIVERED_RECENT: 0
MDC_IDC_STAT_TACHYTHERAPY_ATP_DELIVERED_TOTAL: 0
MDC_IDC_STAT_TACHYTHERAPY_RECENT_DTM_END: NORMAL
MDC_IDC_STAT_TACHYTHERAPY_RECENT_DTM_START: NORMAL
MDC_IDC_STAT_TACHYTHERAPY_SHOCKS_ABORTED_RECENT: 0
MDC_IDC_STAT_TACHYTHERAPY_SHOCKS_ABORTED_TOTAL: 0
MDC_IDC_STAT_TACHYTHERAPY_SHOCKS_DELIVERED_RECENT: 0
MDC_IDC_STAT_TACHYTHERAPY_SHOCKS_DELIVERED_TOTAL: 0
MDC_IDC_STAT_TACHYTHERAPY_TOTAL_DTM_END: NORMAL
MDC_IDC_STAT_TACHYTHERAPY_TOTAL_DTM_START: NORMAL

## 2020-12-26 DIAGNOSIS — I50.22 CHRONIC SYSTOLIC HEART FAILURE (H): Primary | ICD-10-CM

## 2020-12-26 DIAGNOSIS — R06.09 DOE (DYSPNEA ON EXERTION): ICD-10-CM

## 2020-12-27 ENCOUNTER — ANCILLARY PROCEDURE (OUTPATIENT)
Dept: CARDIOLOGY | Facility: CLINIC | Age: 55
End: 2020-12-27
Attending: INTERNAL MEDICINE
Payer: COMMERCIAL

## 2020-12-27 DIAGNOSIS — I50.22 CHRONIC SYSTOLIC HEART FAILURE (H): ICD-10-CM

## 2020-12-27 DIAGNOSIS — R06.09 DOE (DYSPNEA ON EXERTION): ICD-10-CM

## 2020-12-27 LAB
MDC_IDC_EPISODE_DTM: NORMAL
MDC_IDC_EPISODE_ID: NORMAL
MDC_IDC_EPISODE_TYPE: NORMAL
MDC_IDC_LEAD_IMPLANT_DT: NORMAL
MDC_IDC_LEAD_LOCATION: NORMAL
MDC_IDC_LEAD_LOCATION_DETAIL_1: NORMAL
MDC_IDC_LEAD_MFG: NORMAL
MDC_IDC_LEAD_MODEL: NORMAL
MDC_IDC_LEAD_POLARITY_TYPE: NORMAL
MDC_IDC_LEAD_SERIAL: NORMAL
MDC_IDC_MSMT_BATTERY_DTM: NORMAL
MDC_IDC_MSMT_BATTERY_REMAINING_LONGEVITY: 144 MO
MDC_IDC_MSMT_BATTERY_REMAINING_PERCENTAGE: 100 %
MDC_IDC_MSMT_BATTERY_STATUS: NORMAL
MDC_IDC_MSMT_CAP_CHARGE_DTM: NORMAL
MDC_IDC_MSMT_CAP_CHARGE_TIME: 10 S
MDC_IDC_MSMT_CAP_CHARGE_TYPE: NORMAL
MDC_IDC_MSMT_LEADCHNL_RV_IMPEDANCE_VALUE: 443 OHM
MDC_IDC_MSMT_LEADCHNL_RV_PACING_THRESHOLD_AMPLITUDE: 1 V
MDC_IDC_MSMT_LEADCHNL_RV_PACING_THRESHOLD_PULSEWIDTH: 0.4 MS
MDC_IDC_PG_IMPLANT_DTM: NORMAL
MDC_IDC_PG_MFG: NORMAL
MDC_IDC_PG_MODEL: NORMAL
MDC_IDC_PG_SERIAL: NORMAL
MDC_IDC_PG_TYPE: NORMAL
MDC_IDC_SESS_CLINIC_NAME: NORMAL
MDC_IDC_SESS_DTM: NORMAL
MDC_IDC_SESS_TYPE: NORMAL
MDC_IDC_SET_BRADY_LOWRATE: 40 {BEATS}/MIN
MDC_IDC_SET_BRADY_MODE: NORMAL
MDC_IDC_SET_LEADCHNL_RV_PACING_AMPLITUDE: 2.4 V
MDC_IDC_SET_LEADCHNL_RV_PACING_POLARITY: NORMAL
MDC_IDC_SET_LEADCHNL_RV_PACING_PULSEWIDTH: 0.4 MS
MDC_IDC_SET_LEADCHNL_RV_SENSING_ADAPTATION_MODE: NORMAL
MDC_IDC_SET_LEADCHNL_RV_SENSING_POLARITY: NORMAL
MDC_IDC_SET_LEADCHNL_RV_SENSING_SENSITIVITY: 0.6 MV
MDC_IDC_SET_ZONE_DETECTION_INTERVAL: 300 MS
MDC_IDC_SET_ZONE_DETECTION_INTERVAL: 353 MS
MDC_IDC_SET_ZONE_TYPE: NORMAL
MDC_IDC_SET_ZONE_TYPE: NORMAL
MDC_IDC_SET_ZONE_VENDOR_TYPE: NORMAL
MDC_IDC_SET_ZONE_VENDOR_TYPE: NORMAL
MDC_IDC_STAT_BRADY_DTM_END: NORMAL
MDC_IDC_STAT_BRADY_DTM_START: NORMAL
MDC_IDC_STAT_BRADY_RV_PERCENT_PACED: 0 %
MDC_IDC_STAT_EPISODE_RECENT_COUNT: 0
MDC_IDC_STAT_EPISODE_RECENT_COUNT: 1
MDC_IDC_STAT_EPISODE_RECENT_COUNT: 5
MDC_IDC_STAT_EPISODE_RECENT_COUNT_DTM_END: NORMAL
MDC_IDC_STAT_EPISODE_RECENT_COUNT_DTM_START: NORMAL
MDC_IDC_STAT_EPISODE_TYPE: NORMAL
MDC_IDC_STAT_EPISODE_VENDOR_TYPE: NORMAL
MDC_IDC_STAT_TACHYTHERAPY_ATP_DELIVERED_RECENT: 0
MDC_IDC_STAT_TACHYTHERAPY_ATP_DELIVERED_TOTAL: 0
MDC_IDC_STAT_TACHYTHERAPY_RECENT_DTM_END: NORMAL
MDC_IDC_STAT_TACHYTHERAPY_RECENT_DTM_START: NORMAL
MDC_IDC_STAT_TACHYTHERAPY_SHOCKS_ABORTED_RECENT: 0
MDC_IDC_STAT_TACHYTHERAPY_SHOCKS_ABORTED_TOTAL: 0
MDC_IDC_STAT_TACHYTHERAPY_SHOCKS_DELIVERED_RECENT: 0
MDC_IDC_STAT_TACHYTHERAPY_SHOCKS_DELIVERED_TOTAL: 0
MDC_IDC_STAT_TACHYTHERAPY_TOTAL_DTM_END: NORMAL
MDC_IDC_STAT_TACHYTHERAPY_TOTAL_DTM_START: NORMAL

## 2020-12-27 PROCEDURE — 93296 REM INTERROG EVL PM/IDS: CPT

## 2020-12-27 PROCEDURE — 99207 CARDIAC DEVICE CHECK - REMOTE: CPT | Performed by: INTERNAL MEDICINE

## 2020-12-31 DIAGNOSIS — R06.09 DOE (DYSPNEA ON EXERTION): ICD-10-CM

## 2020-12-31 DIAGNOSIS — I42.2 HYPERTROPHIC NONOBSTRUCTIVE CARDIOMYOPATHY (H): ICD-10-CM

## 2020-12-31 DIAGNOSIS — I50.22 CHRONIC SYSTOLIC HEART FAILURE (H): Primary | ICD-10-CM

## 2020-12-31 DIAGNOSIS — I50.23 ACUTE ON CHRONIC SYSTOLIC CONGESTIVE HEART FAILURE (H): ICD-10-CM

## 2021-01-05 RX ORDER — CARVEDILOL 12.5 MG/1
12.5 TABLET ORAL 2 TIMES DAILY WITH MEALS
Qty: 180 TABLET | Refills: 0 | Status: SHIPPED | OUTPATIENT
Start: 2021-01-05 | End: 2021-03-26

## 2021-01-07 ENCOUNTER — ANCILLARY PROCEDURE (OUTPATIENT)
Dept: CARDIOLOGY | Facility: CLINIC | Age: 56
End: 2021-01-07
Attending: INTERNAL MEDICINE
Payer: COMMERCIAL

## 2021-01-07 DIAGNOSIS — I42.8 CARDIOMYOPATHY, NONISCHEMIC (H): ICD-10-CM

## 2021-01-07 DIAGNOSIS — I42.8 CARDIOMYOPATHY, NONISCHEMIC (H): Primary | ICD-10-CM

## 2021-01-07 PROCEDURE — 99207 CARDIAC DEVICE CHECK - REMOTE: CPT | Performed by: INTERNAL MEDICINE

## 2021-01-07 PROCEDURE — 93296 REM INTERROG EVL PM/IDS: CPT

## 2021-01-09 LAB
MDC_IDC_LEAD_IMPLANT_DT: NORMAL
MDC_IDC_LEAD_LOCATION: NORMAL
MDC_IDC_LEAD_LOCATION_DETAIL_1: NORMAL
MDC_IDC_LEAD_MFG: NORMAL
MDC_IDC_LEAD_MODEL: NORMAL
MDC_IDC_LEAD_POLARITY_TYPE: NORMAL
MDC_IDC_LEAD_SERIAL: NORMAL
MDC_IDC_MSMT_BATTERY_DTM: NORMAL
MDC_IDC_MSMT_BATTERY_REMAINING_LONGEVITY: 144 MO
MDC_IDC_MSMT_BATTERY_REMAINING_PERCENTAGE: 100 %
MDC_IDC_MSMT_BATTERY_STATUS: NORMAL
MDC_IDC_MSMT_CAP_CHARGE_DTM: NORMAL
MDC_IDC_MSMT_CAP_CHARGE_TIME: 10 S
MDC_IDC_MSMT_CAP_CHARGE_TYPE: NORMAL
MDC_IDC_MSMT_LEADCHNL_RV_IMPEDANCE_VALUE: 448 OHM
MDC_IDC_MSMT_LEADCHNL_RV_PACING_THRESHOLD_AMPLITUDE: 1 V
MDC_IDC_MSMT_LEADCHNL_RV_PACING_THRESHOLD_PULSEWIDTH: 0.4 MS
MDC_IDC_PG_IMPLANT_DTM: NORMAL
MDC_IDC_PG_MFG: NORMAL
MDC_IDC_PG_MODEL: NORMAL
MDC_IDC_PG_SERIAL: NORMAL
MDC_IDC_PG_TYPE: NORMAL
MDC_IDC_SESS_CLINIC_NAME: NORMAL
MDC_IDC_SESS_DTM: NORMAL
MDC_IDC_SESS_TYPE: NORMAL
MDC_IDC_SET_BRADY_LOWRATE: 40 {BEATS}/MIN
MDC_IDC_SET_BRADY_MODE: NORMAL
MDC_IDC_SET_LEADCHNL_RV_PACING_AMPLITUDE: 2.4 V
MDC_IDC_SET_LEADCHNL_RV_PACING_POLARITY: NORMAL
MDC_IDC_SET_LEADCHNL_RV_PACING_PULSEWIDTH: 0.4 MS
MDC_IDC_SET_LEADCHNL_RV_SENSING_ADAPTATION_MODE: NORMAL
MDC_IDC_SET_LEADCHNL_RV_SENSING_POLARITY: NORMAL
MDC_IDC_SET_LEADCHNL_RV_SENSING_SENSITIVITY: 0.6 MV
MDC_IDC_SET_ZONE_DETECTION_INTERVAL: 300 MS
MDC_IDC_SET_ZONE_DETECTION_INTERVAL: 353 MS
MDC_IDC_SET_ZONE_TYPE: NORMAL
MDC_IDC_SET_ZONE_TYPE: NORMAL
MDC_IDC_SET_ZONE_VENDOR_TYPE: NORMAL
MDC_IDC_SET_ZONE_VENDOR_TYPE: NORMAL
MDC_IDC_STAT_BRADY_DTM_END: NORMAL
MDC_IDC_STAT_BRADY_DTM_START: NORMAL
MDC_IDC_STAT_BRADY_RV_PERCENT_PACED: 0 %
MDC_IDC_STAT_EPISODE_RECENT_COUNT: 0
MDC_IDC_STAT_EPISODE_RECENT_COUNT: 1
MDC_IDC_STAT_EPISODE_RECENT_COUNT: 5
MDC_IDC_STAT_EPISODE_RECENT_COUNT_DTM_END: NORMAL
MDC_IDC_STAT_EPISODE_RECENT_COUNT_DTM_START: NORMAL
MDC_IDC_STAT_EPISODE_TYPE: NORMAL
MDC_IDC_STAT_EPISODE_VENDOR_TYPE: NORMAL
MDC_IDC_STAT_TACHYTHERAPY_ATP_DELIVERED_RECENT: 0
MDC_IDC_STAT_TACHYTHERAPY_ATP_DELIVERED_TOTAL: 0
MDC_IDC_STAT_TACHYTHERAPY_RECENT_DTM_END: NORMAL
MDC_IDC_STAT_TACHYTHERAPY_RECENT_DTM_START: NORMAL
MDC_IDC_STAT_TACHYTHERAPY_SHOCKS_ABORTED_RECENT: 0
MDC_IDC_STAT_TACHYTHERAPY_SHOCKS_ABORTED_TOTAL: 0
MDC_IDC_STAT_TACHYTHERAPY_SHOCKS_DELIVERED_RECENT: 0
MDC_IDC_STAT_TACHYTHERAPY_SHOCKS_DELIVERED_TOTAL: 0
MDC_IDC_STAT_TACHYTHERAPY_TOTAL_DTM_END: NORMAL
MDC_IDC_STAT_TACHYTHERAPY_TOTAL_DTM_START: NORMAL

## 2021-01-11 DIAGNOSIS — I50.23 ACUTE ON CHRONIC SYSTOLIC CONGESTIVE HEART FAILURE (H): ICD-10-CM

## 2021-01-11 DIAGNOSIS — R06.09 DOE (DYSPNEA ON EXERTION): ICD-10-CM

## 2021-01-12 RX ORDER — ALBUTEROL SULFATE 90 UG/1
AEROSOL, METERED RESPIRATORY (INHALATION)
Qty: 18 G | Refills: 11 | Status: SHIPPED | OUTPATIENT
Start: 2021-01-12 | End: 2021-10-13

## 2021-01-13 ENCOUNTER — TELEPHONE (OUTPATIENT)
Dept: CARDIOLOGY | Facility: CLINIC | Age: 56
End: 2021-01-13

## 2021-01-13 NOTE — TELEPHONE ENCOUNTER
Sent [facility] for patient to call and reschedule 1/26 appointment with device check prior. Dr. Aguiar is no longer available on that date.

## 2021-01-14 ENCOUNTER — HEALTH MAINTENANCE LETTER (OUTPATIENT)
Age: 56
End: 2021-01-14

## 2021-01-27 ASSESSMENT — ENCOUNTER SYMPTOMS
SLEEP DISTURBANCES DUE TO BREATHING: 0
LIGHT-HEADEDNESS: 1
SPUTUM PRODUCTION: 1
LEG PAIN: 1
HYPERTENSION: 0
DYSPNEA ON EXERTION: 1
HYPOTENSION: 0
SHORTNESS OF BREATH: 1
PALPITATIONS: 1
ORTHOPNEA: 0
EXERCISE INTOLERANCE: 1
SYNCOPE: 0

## 2021-01-27 NOTE — PROGRESS NOTES
Advanced Heart Failure Clinic Note    1/28/2021     Akshat Fragoso is a pleasant 55 year old male with a past medical history of nonischemic cardiomyopathy (stage C, NYHA III) who presents for follow up. He was last seen in November of 2019.    As noted in previous notes patient does have significantly reduced ejection fraction around 10-15% and has been been doing progressively worse. Given the above we did initiate LVAD and transplant evaluation that he partially completed thus far. He was previously placed on entresto but was only able to tolerate it for a week then discontinued it. He mentioned that he was having significant dizziness, lightheadedness.  He did quit smoking and denies any alcohol or illicit drug use.      At the patient's last visit, he continued to have class III symptoms. Right heart catheterization prior to his last visit showed normal biventricular filing pressures and a normal cardiac output level. Echocardiogram was notable for an EF of 10-20% with an LVIDd of 7.9 cm.     Since last being seen, the patient reports a slowly progressive worsening functional status.  He states that walking up 1 flight of stairs is more difficult for him.  He also recently attempted to go deer hunting with his son, which caused significant dyspnea.  He has no significant orthopnea, PND, or lower extremity swelling.  He also denies arrhythmias or chest pains.  He states that he was told that he should not come in due to the coronavirus epidemic, which is why he has been delayed in getting an appointment.  CPX and labs were previously ordered, though have not yet been scheduled.  He is currently taking as needed furosemide approximately 2-3 times per month.    Unfortunately, the patient continues to smoke marijuana.    ROS otherwise negative     PAST MEDICAL HISTORY:  - Nonischemic cardiomyopathy              - Stage C, NYHA III              - Suspected to be due to myocarditis  - Right lumbar  radiculopathy      PAST MEDICAL HISTORY:  Past Medical History:   Diagnosis Date     Acute right lumbar radiculopathy 11/2/2015     Acute systolic heart failure (H) 1/10/2017     Cardiomyopathy (H) 1/10/2017     CARDIOVASCULAR SCREENING; LDL GOAL LESS THAN 160 5/9/2010     JOHNSON (dyspnea on exertion)      Erectile dysfunction      Personal history of smoking 1/4/2017     Pneumonia 11/2011     FAMILY HISTORY:  Family History   Problem Relation Age of Onset     Anorexia nervosa Daughter      Other - See Comments Granddaughter         premature birth        SOCIAL HISTORY:  Social History     Socioeconomic History     Marital status:      Spouse name: Cheryl     Number of children: 2     Years of education: None     Highest education level: None   Occupational History     Occupation: Construction      Employer: SELF   Social Needs     Financial resource strain: None     Food insecurity:     Worry: None     Inability: None     Transportation needs:     Medical: None     Non-medical: None   Tobacco Use     Smoking status: Light Tobacco Smoker     Packs/day: 0.25     Years: 15.00     Pack years: 3.75     Types: Cigarettes     Smokeless tobacco: Former User     Quit date: 10/24/2011   Substance and Sexual Activity     Alcohol use: No     Alcohol/week: 0.0 standard drinks     Drug use: No     Sexual activity: Yes     Partners: Female     Birth control/protection: Condom   Lifestyle     Physical activity:     Days per week: None     Minutes per session: None     Stress: None   Relationships     Social connections:     Talks on phone: None     Gets together: None     Attends Denominational service: None     Active member of club or organization: None     Attends meetings of clubs or organizations: None     Relationship status: None     Intimate partner violence:     Fear of current or ex partner: None     Emotionally abused: None     Physically abused: None     Forced sexual activity: None   Other Topics Concern      "Parent/sibling w/ CABG, MI or angioplasty before 65F 55M? Yes     Comment: both parents had stints placed    Social History Narrative     None     CURRENT MEDICATIONS:  Current Outpatient Medications   Medication     albuterol (PROAIR HFA/PROVENTIL HFA/VENTOLIN HFA) 108 (90 Base) MCG/ACT inhaler     aspirin 81 MG tablet     carvedilol (COREG) 12.5 MG tablet     furosemide (LASIX) 20 MG tablet     HYDROcodone-acetaminophen (NORCO) 5-325 MG tablet     lisinopril (ZESTRIL) 5 MG tablet     sildenafil (VIAGRA) 50 MG tablet     spironolactone (ALDACTONE) 25 MG tablet     Blood Pressure Monitor KIT     No current facility-administered medications for this visit.       ROS:   A comprehensive 12 point review of systems was completed and relevant findings are noted in the HPI.     EXAM:  /74 (BP Location: Right arm, Patient Position: Chair, Cuff Size: Adult Regular)   Pulse 76   Ht 1.702 m (5' 7\")   Wt 87.5 kg (193 lb)   SpO2 96%   BMI 30.23 kg/m     General: appears comfortable, alert and oriented  Head: normocephalic, atraumatic  Eyes: anicteric sclera, EOMI , PERRL  Neck: no adenopathy  Orophyarynx: moist mucosa, no lesions noted  Heart: regular, S1/S2, no murmurs, rubs or gallop. Estimated JVP at 7 cmH2O  Lungs: CTAB, No wheezing.   Abdomen: soft, non-tender, bowel sounds present, no hepatosplenomegaly  Extremities: No LE edema today  Skin: no open lesions noted  Neuro: grossly non-focal     Labs:  Lab Results   Component Value Date    WBC 9.8 11/20/2019    HGB 14.3 11/20/2019    HCT 45.3 11/20/2019     11/20/2019     11/20/2019    POTASSIUM 4.5 11/20/2019    CHLORIDE 105 11/20/2019    CO2 28 11/20/2019    BUN 23 11/20/2019    CR 0.90 11/20/2019     (H) 11/20/2019    NTBNPI 11,183 (H) 01/09/2017    NTBNP 2,306 (H) 11/20/2019    TROPI 0.033 01/09/2017    AST 16 11/20/2019    ALT 22 11/20/2019    ALKPHOS 68 11/20/2019    BILITOTAL 0.3 11/20/2019    INR 1.01 11/19/2018     Assessment and " Plan:    In summary, this is a 55 year old man with a history of NICM presenting for follow up.    1. Chronic heart failure with reduced ejection fraction secondary to non-ischemic cardiomyopathy: NYHA III, ACC C. Currently managed with lisinopril 5 mg twice per day, spironolactone 12.5 mg daily and carvedilol 12.5 mg twice per day. He did not tolerate Entresto in the past. Has single chamber ICD for primary prevention and today's device interrogation shows no arrhythmias.  Ultimately, the patient is unlikely to have any significant LV recovery.  Given his young age, he would benefit from an advanced therapies evaluation.  However, this has been hindered by current substance use and poor follow-up.  We will plan for today to repeat an echocardiogram, labs, and a CPX.  Plan to see him again in 3 months.    This patient was seen and staffed with my attending, Dr. Aguiar, who agrees with my assessment and plan.    Tushar Vick, cardiology fellow.    I have seen and evaluated the patient and agree with the assessment and plan as above.  Shaun Aguiar    I appreciate the opportunity to participate in the care of Akshat Fragoso . Please do not hesitate to contact me with any further questions.    Sincerely,      Shaun Aguiar MD     [facility] Division of Cardiology

## 2021-01-28 ENCOUNTER — OFFICE VISIT (OUTPATIENT)
Dept: CARDIOLOGY | Facility: CLINIC | Age: 56
End: 2021-01-28
Attending: INTERNAL MEDICINE
Payer: COMMERCIAL

## 2021-01-28 VITALS
HEIGHT: 67 IN | HEART RATE: 76 BPM | WEIGHT: 193 LBS | OXYGEN SATURATION: 96 % | DIASTOLIC BLOOD PRESSURE: 74 MMHG | SYSTOLIC BLOOD PRESSURE: 117 MMHG | BODY MASS INDEX: 30.29 KG/M2

## 2021-01-28 DIAGNOSIS — E78.2 MIXED HYPERLIPIDEMIA: ICD-10-CM

## 2021-01-28 DIAGNOSIS — I42.8 CARDIOMYOPATHY, NONISCHEMIC (H): ICD-10-CM

## 2021-01-28 DIAGNOSIS — Z95.810 ICD (IMPLANTABLE CARDIOVERTER-DEFIBRILLATOR) IN PLACE: ICD-10-CM

## 2021-01-28 DIAGNOSIS — I10 BENIGN ESSENTIAL HYPERTENSION: Primary | ICD-10-CM

## 2021-01-28 DIAGNOSIS — I42.8 NONISCHEMIC CARDIOMYOPATHY (H): ICD-10-CM

## 2021-01-28 PROCEDURE — 99214 OFFICE O/P EST MOD 30 MIN: CPT | Mod: 25 | Performed by: INTERNAL MEDICINE

## 2021-01-28 PROCEDURE — G0463 HOSPITAL OUTPT CLINIC VISIT: HCPCS

## 2021-01-28 PROCEDURE — 93282 PRGRMG EVAL IMPLANTABLE DFB: CPT | Performed by: INTERNAL MEDICINE

## 2021-01-28 ASSESSMENT — PAIN SCALES - GENERAL: PAINLEVEL: NO PAIN (0)

## 2021-01-28 ASSESSMENT — MIFFLIN-ST. JEOR: SCORE: 1679.07

## 2021-01-28 NOTE — PATIENT INSTRUCTIONS
Thank you for your visit today.  Please call me with any questions or concerns.   Mando Kay RN  Cardiology Care Coordinator  [phone], press option 1 then option 4

## 2021-01-28 NOTE — PATIENT INSTRUCTIONS
It was a pleasure to see you in clinic today. Please do not hesitate to call with any questions or concerns. You are scheduled for a remote ICD transmission on 4/28/2021. This will happen automatically in the night. We will call in 1-2 business days to discuss the results with you. We look forward to seeing you in clinic at your next device check in 6 months    Constance Bush, RN  Electrophysiology Nurse Clinician  [facility]  During business hours call:  [phone]  After business hours please call: [phone]- select option #4 and ask for job code [phone].

## 2021-01-28 NOTE — LETTER
1/28/2021      RE: Akshat Fragoso  [address]  [facility] [address]       Dear Colleague,    Thank you for the opportunity to participate in the care of your patient, Akshat Fragoso, at the [facility] [place] [facility]. Please see a copy of my visit note below.    Advanced Heart Failure Clinic Note    1/28/2021     Akshat Fragoso is a pleasant 55 year old male with a past medical history of nonischemic cardiomyopathy (stage C, NYHA III) who presents for follow up. He was last seen in November of 2019.    As noted in previous notes patient does have significantly reduced ejection fraction around 10-15% and has been been doing progressively worse. Given the above we did initiate LVAD and transplant evaluation that he partially completed thus far. He was previously placed on entresto but was only able to tolerate it for a week then discontinued it. He mentioned that he was having significant dizziness, lightheadedness.  He did quit smoking and denies any alcohol or illicit drug use.      At the patient's last visit, he continued to have class III symptoms. Right heart catheterization prior to his last visit showed normal biventricular filing pressures and a normal cardiac output level. Echocardiogram was notable for an EF of 10-20% with an LVIDd of 7.9 cm.     Since last being seen, the patient reports a slowly progressive worsening functional status.  He states that walking up 1 flight of stairs is more difficult for him.  He also recently attempted to go deer hunting with his son, which caused significant dyspnea.  He has no significant orthopnea, PND, or lower extremity swelling.  He also denies arrhythmias or chest pains.  He states that he was told that he should not come in due to the coronavirus epidemic, which is why he has been delayed in getting an appointment.  CPX and labs were previously ordered, though have not yet been scheduled.  He is  currently taking as needed furosemide approximately 2-3 times per month.    Unfortunately, the patient continues to smoke marijuana.    ROS otherwise negative     PAST MEDICAL HISTORY:  - Nonischemic cardiomyopathy              - Stage C, NYHA III              - Suspected to be due to myocarditis  - Right lumbar radiculopathy      PAST MEDICAL HISTORY:  Past Medical History:   Diagnosis Date     Acute right lumbar radiculopathy 11/2/2015     Acute systolic heart failure (H) 1/10/2017     Cardiomyopathy (H) 1/10/2017     CARDIOVASCULAR SCREENING; LDL GOAL LESS THAN 160 5/9/2010     JOHNSON (dyspnea on exertion)      Erectile dysfunction      Personal history of smoking 1/4/2017     Pneumonia 11/2011     FAMILY HISTORY:  Family History   Problem Relation Age of Onset     Anorexia nervosa Daughter      Other - See Comments Granddaughter         premature birth        SOCIAL HISTORY:  Social History     Socioeconomic History     Marital status:      Spouse name: Cheryl     Number of children: 2     Years of education: None     Highest education level: None   Occupational History     Occupation: Construction      Employer: SELF   Social Needs     Financial resource strain: None     Food insecurity:     Worry: None     Inability: None     Transportation needs:     Medical: None     Non-medical: None   Tobacco Use     Smoking status: Light Tobacco Smoker     Packs/day: 0.25     Years: 15.00     Pack years: 3.75     Types: Cigarettes     Smokeless tobacco: Former User     Quit date: 10/24/2011   Substance and Sexual Activity     Alcohol use: No     Alcohol/week: 0.0 standard drinks     Drug use: No     Sexual activity: Yes     Partners: Female     Birth control/protection: Condom   Lifestyle     Physical activity:     Days per week: None     Minutes per session: None     Stress: None   Relationships     Social connections:     Talks on phone: None     Gets together: None     Attends Latter-day service: None     Active  "member of club or organization: None     Attends meetings of clubs or organizations: None     Relationship status: None     Intimate partner violence:     Fear of current or ex partner: None     Emotionally abused: None     Physically abused: None     Forced sexual activity: None   Other Topics Concern     Parent/sibling w/ CABG, MI or angioplasty before 65F 55M? Yes     Comment: both parents had stints placed    Social History Narrative     None     CURRENT MEDICATIONS:  Current Outpatient Medications   Medication     albuterol (PROAIR HFA/PROVENTIL HFA/VENTOLIN HFA) 108 (90 Base) MCG/ACT inhaler     aspirin 81 MG tablet     carvedilol (COREG) 12.5 MG tablet     furosemide (LASIX) 20 MG tablet     HYDROcodone-acetaminophen (NORCO) 5-325 MG tablet     lisinopril (ZESTRIL) 5 MG tablet     sildenafil (VIAGRA) 50 MG tablet     spironolactone (ALDACTONE) 25 MG tablet     Blood Pressure Monitor KIT     No current facility-administered medications for this visit.       ROS:   A comprehensive 12 point review of systems was completed and relevant findings are noted in the HPI.     EXAM:  /74 (BP Location: Right arm, Patient Position: Chair, Cuff Size: Adult Regular)   Pulse 76   Ht 1.702 m (5' 7\")   Wt 87.5 kg (193 lb)   SpO2 96%   BMI 30.23 kg/m     General: appears comfortable, alert and oriented  Head: normocephalic, atraumatic  Eyes: anicteric sclera, EOMI , PERRL  Neck: no adenopathy  Orophyarynx: moist mucosa, no lesions noted  Heart: regular, S1/S2, no murmurs, rubs or gallop. Estimated JVP at 7 cmH2O  Lungs: CTAB, No wheezing.   Abdomen: soft, non-tender, bowel sounds present, no hepatosplenomegaly  Extremities: No LE edema today  Skin: no open lesions noted  Neuro: grossly non-focal     Labs:  Lab Results   Component Value Date    WBC 9.8 11/20/2019    HGB 14.3 11/20/2019    HCT 45.3 11/20/2019     11/20/2019     11/20/2019    POTASSIUM 4.5 11/20/2019    CHLORIDE 105 11/20/2019    CO2 28 " 11/20/2019    BUN 23 11/20/2019    CR 0.90 11/20/2019     (H) 11/20/2019    NTBNPI 11,183 (H) 01/09/2017    NTBNP 2,306 (H) 11/20/2019    TROPI 0.033 01/09/2017    AST 16 11/20/2019    ALT 22 11/20/2019    ALKPHOS 68 11/20/2019    BILITOTAL 0.3 11/20/2019    INR 1.01 11/19/2018     Assessment and Plan:    In summary, this is a 55 year old man with a history of NICM presenting for follow up.    1. Chronic heart failure with reduced ejection fraction secondary to non-ischemic cardiomyopathy: NYHA III, ACC C. Currently managed with lisinopril 5 mg twice per day, spironolactone 12.5 mg daily and carvedilol 12.5 mg twice per day. He did not tolerate Entresto in the past. Has single chamber ICD for primary prevention and today's device interrogation shows no arrhythmias.  Ultimately, the patient is unlikely to have any significant LV recovery.  Given his young age, he would benefit from an advanced therapies evaluation.  However, this has been hindered by current substance use and poor follow-up.  We will plan for today to repeat an echocardiogram, labs, and a CPX.  Plan to see him again in 3 months.    This patient was seen and staffed with my attending, Dr. Aguiar, who agrees with my assessment and plan.    Tushar Vick, cardiology fellow.    I have seen and evaluated the patient and agree with the assessment and plan as above.  Shaun Aguiar    I appreciate the opportunity to participate in the care of Akshat DONATO [place] . Please do not hesitate to contact me with any further questions.    Sincerely,      Shaun Aguiar MD     [facility] Division of Cardiology

## 2021-01-28 NOTE — NURSING NOTE
Chief Complaint   Patient presents with     Follow Up     chronic systolic heart failure secondary to NICM      Vitals were taken and medication reconciled.    PHYLICIA Bush  9:21 AM

## 2021-01-29 DIAGNOSIS — I50.23 ACUTE ON CHRONIC SYSTOLIC CONGESTIVE HEART FAILURE (H): ICD-10-CM

## 2021-01-29 DIAGNOSIS — I42.2 HYPERTROPHIC NONOBSTRUCTIVE CARDIOMYOPATHY (H): ICD-10-CM

## 2021-01-29 DIAGNOSIS — I50.22 CHRONIC SYSTOLIC HEART FAILURE (H): ICD-10-CM

## 2021-01-29 DIAGNOSIS — R06.09 DOE (DYSPNEA ON EXERTION): ICD-10-CM

## 2021-01-29 LAB
MDC_IDC_EPISODE_DTM: NORMAL
MDC_IDC_EPISODE_DTM: NORMAL
MDC_IDC_EPISODE_ID: NORMAL
MDC_IDC_EPISODE_ID: NORMAL
MDC_IDC_EPISODE_TYPE: NORMAL
MDC_IDC_EPISODE_TYPE: NORMAL
MDC_IDC_LEAD_IMPLANT_DT: NORMAL
MDC_IDC_LEAD_LOCATION: NORMAL
MDC_IDC_LEAD_LOCATION_DETAIL_1: NORMAL
MDC_IDC_LEAD_MFG: NORMAL
MDC_IDC_LEAD_MODEL: NORMAL
MDC_IDC_LEAD_POLARITY_TYPE: NORMAL
MDC_IDC_LEAD_SERIAL: NORMAL
MDC_IDC_MSMT_BATTERY_STATUS: NORMAL
MDC_IDC_MSMT_CAP_CHARGE_DTM: NORMAL
MDC_IDC_MSMT_CAP_CHARGE_ENERGY: 0 J
MDC_IDC_MSMT_CAP_CHARGE_TIME: 0 S
MDC_IDC_MSMT_CAP_CHARGE_TIME: 10.02
MDC_IDC_MSMT_CAP_CHARGE_TYPE: NORMAL
MDC_IDC_MSMT_CAP_CHARGE_TYPE: NORMAL
MDC_IDC_MSMT_LEADCHNL_RV_IMPEDANCE_VALUE: 460 OHM
MDC_IDC_MSMT_LEADCHNL_RV_PACING_THRESHOLD_AMPLITUDE: 1 V
MDC_IDC_MSMT_LEADCHNL_RV_PACING_THRESHOLD_PULSEWIDTH: 0.4 MS
MDC_IDC_MSMT_LEADCHNL_RV_SENSING_INTR_AMPL: 25 MV
MDC_IDC_PG_IMPLANT_DTM: NORMAL
MDC_IDC_PG_MFG: NORMAL
MDC_IDC_PG_MODEL: NORMAL
MDC_IDC_PG_SERIAL: NORMAL
MDC_IDC_PG_TYPE: NORMAL
MDC_IDC_SESS_CLINIC_NAME: NORMAL
MDC_IDC_SESS_DTM: NORMAL
MDC_IDC_SESS_TYPE: NORMAL
MDC_IDC_SET_BRADY_HYSTRATE: NORMAL
MDC_IDC_SET_BRADY_LOWRATE: 40 {BEATS}/MIN
MDC_IDC_SET_BRADY_MODE: NORMAL
MDC_IDC_SET_LEADCHNL_RV_PACING_AMPLITUDE: 2.4 V
MDC_IDC_SET_LEADCHNL_RV_PACING_CAPTURE_MODE: NORMAL
MDC_IDC_SET_LEADCHNL_RV_PACING_POLARITY: NORMAL
MDC_IDC_SET_LEADCHNL_RV_PACING_PULSEWIDTH: 0.4 MS
MDC_IDC_SET_LEADCHNL_RV_SENSING_ADAPTATION_MODE: NORMAL
MDC_IDC_SET_LEADCHNL_RV_SENSING_POLARITY: NORMAL
MDC_IDC_SET_LEADCHNL_RV_SENSING_SENSITIVITY: 0.6 MV
MDC_IDC_SET_ZONE_DETECTION_INTERVAL: 300 MS
MDC_IDC_SET_ZONE_DETECTION_INTERVAL: 353 MS
MDC_IDC_SET_ZONE_TYPE: NORMAL
MDC_IDC_SET_ZONE_VENDOR_TYPE: NORMAL
MDC_IDC_STAT_EPISODE_RECENT_COUNT: 1
MDC_IDC_STAT_EPISODE_RECENT_COUNT_DTM_END: NORMAL
MDC_IDC_STAT_EPISODE_RECENT_COUNT_DTM_START: NORMAL
MDC_IDC_STAT_EPISODE_TOTAL_COUNT: 11
MDC_IDC_STAT_EPISODE_TOTAL_COUNT: 11
MDC_IDC_STAT_EPISODE_TOTAL_COUNT: 5
MDC_IDC_STAT_EPISODE_TOTAL_COUNT_DTM_END: NORMAL
MDC_IDC_STAT_EPISODE_TYPE: NORMAL
MDC_IDC_STAT_EPISODE_VENDOR_TYPE: NORMAL
MDC_IDC_STAT_TACHYTHERAPY_ATP_DELIVERED_RECENT: 0
MDC_IDC_STAT_TACHYTHERAPY_ATP_DELIVERED_TOTAL: 0
MDC_IDC_STAT_TACHYTHERAPY_RECENT_DTM_END: NORMAL
MDC_IDC_STAT_TACHYTHERAPY_RECENT_DTM_START: NORMAL
MDC_IDC_STAT_TACHYTHERAPY_SHOCKS_ABORTED_RECENT: 0
MDC_IDC_STAT_TACHYTHERAPY_SHOCKS_ABORTED_TOTAL: 0
MDC_IDC_STAT_TACHYTHERAPY_SHOCKS_DELIVERED_RECENT: 0
MDC_IDC_STAT_TACHYTHERAPY_SHOCKS_DELIVERED_TOTAL: 0
MDC_IDC_STAT_TACHYTHERAPY_TOTAL_DTM_END: NORMAL

## 2021-02-02 ENCOUNTER — TELEPHONE (OUTPATIENT)
Dept: FAMILY MEDICINE | Facility: CLINIC | Age: 56
End: 2021-02-02

## 2021-02-02 RX ORDER — SPIRONOLACTONE 25 MG/1
12.5 TABLET ORAL DAILY
Qty: 45 TABLET | Refills: 1 | Status: SHIPPED | OUTPATIENT
Start: 2021-02-02 | End: 2021-07-05

## 2021-02-02 NOTE — TELEPHONE ENCOUNTER
12/10/2020 was last visit.  12/10/20 was last refill of this pain med.    CO- Please advise on this med refill.  You have ONE SDH on 2/3/21.  WILDER Lennon

## 2021-02-02 NOTE — TELEPHONE ENCOUNTER
spironolactone (ALDACTONE) 25 MG tablet      Last Written Prescription Date:  11/30/20  Last Fill Quantity: 45,   # refills: 0  Last Office Visit : Shaun Aguiar MD  Cardiovascular Disease  1/28/2021  [facility]  Recommended 3 month follow up  Future Office visit:  None scheduled    Routing refill request to provider for review/approval because:  Overdue for labs, received 90 day benson with last refill  Lab Test 11/20/19  0835   CR 0.90     Lab Test 11/20/19  0835   POTASSIUM 4.5     Lab Test 11/20/19  0835        Nothing more current in care everywhere nor media.    Future orders in que  30 day refill pended per protocol, routed to Cardiology refills [facility]

## 2021-02-02 NOTE — TELEPHONE ENCOUNTER
Reason for Call:  Medication or medication refill: refill request    Do you use a [facility]?  Name of the pharmacy and phone number for the current request:  [facility] on [address] - [phone]    Name of the medication requested: hydrocodone-acetaminophen 5-325 mg for sciatica/ leg pain    Other request: none    Can we leave a detailed message on this number? YES    Phone number patient can be reached at: Cell number on file:    Telephone Information:   Mobile [phone]       Best Time: any    Call taken on 2/2/2021 at 9:02 AM by Xenia Vincent

## 2021-02-24 ENCOUNTER — TRANSFERRED RECORDS (OUTPATIENT)
Dept: HEALTH INFORMATION MANAGEMENT | Facility: CLINIC | Age: 56
End: 2021-02-24

## 2021-02-24 LAB — EJECTION FRACTION: 12 %

## 2021-03-08 ENCOUNTER — TRANSFERRED RECORDS (OUTPATIENT)
Dept: HEALTH INFORMATION MANAGEMENT | Facility: CLINIC | Age: 56
End: 2021-03-08

## 2021-03-11 NOTE — PLAN OF CARE
Problem: Goal Outcome Summary  Goal: Goal Outcome Summary  PT 6C: Pt at procedure in AM, unable to check back in PM.          magui

## 2021-03-16 ENCOUNTER — TELEPHONE (OUTPATIENT)
Dept: CARDIOLOGY | Facility: CLINIC | Age: 56
End: 2021-03-16

## 2021-03-16 NOTE — TELEPHONE ENCOUNTER
LVM reminding patient to call back to schedule stress test. Original appt was scheduled for 2-2-21 and was cancelled by patient.

## 2021-03-18 ENCOUNTER — IMMUNIZATION (OUTPATIENT)
Dept: NURSING | Facility: CLINIC | Age: 56
End: 2021-03-18
Payer: COMMERCIAL

## 2021-03-18 PROCEDURE — 91301 PR COVID VAC MODERNA 100 MCG/0.5 ML IM: CPT

## 2021-03-18 PROCEDURE — 0011A PR COVID VAC MODERNA 100 MCG/0.5 ML IM: CPT

## 2021-03-23 ENCOUNTER — TRANSFERRED RECORDS (OUTPATIENT)
Dept: HEALTH INFORMATION MANAGEMENT | Facility: CLINIC | Age: 56
End: 2021-03-23

## 2021-03-26 ENCOUNTER — MYC REFILL (OUTPATIENT)
Dept: CARDIOLOGY | Facility: CLINIC | Age: 56
End: 2021-03-26

## 2021-03-26 DIAGNOSIS — R06.09 DOE (DYSPNEA ON EXERTION): ICD-10-CM

## 2021-03-26 DIAGNOSIS — I42.2 HYPERTROPHIC NONOBSTRUCTIVE CARDIOMYOPATHY (H): ICD-10-CM

## 2021-03-26 DIAGNOSIS — I50.23 ACUTE ON CHRONIC SYSTOLIC CONGESTIVE HEART FAILURE (H): ICD-10-CM

## 2021-03-26 DIAGNOSIS — I50.22 CHRONIC SYSTOLIC HEART FAILURE (H): ICD-10-CM

## 2021-03-26 RX ORDER — CARVEDILOL 12.5 MG/1
12.5 TABLET ORAL 2 TIMES DAILY WITH MEALS
Qty: 180 TABLET | Refills: 3 | Status: SHIPPED | OUTPATIENT
Start: 2021-03-26 | End: 2021-12-22

## 2021-03-26 RX ORDER — CARVEDILOL 12.5 MG/1
12.5 TABLET ORAL 2 TIMES DAILY WITH MEALS
Qty: 180 TABLET | Refills: 0 | Status: CANCELLED | OUTPATIENT
Start: 2021-03-26

## 2021-03-26 NOTE — TELEPHONE ENCOUNTER
Centralized Medication Refill Team note:   carvedilol (COREG) 12.5 MG tablet  Last Written Prescription Date:  1/5/2021  Last Fill Quantity: 180,   # refills: 0  Last Office Visit : 1/28/2021  Future Office visit:  None    Refilled per protocol. MyChart sent

## 2021-03-26 NOTE — TELEPHONE ENCOUNTER
[facility] Call Center    Phone Message    May a detailed message be left on voicemail: no     Reason for Call: Medication Refill Request    Has the patient contacted the pharmacy for the refill? Yes   Name of medication being requested: Carvedilol  Provider who prescribed the medication: Dr. Aguiar  Pharmacy: [facility]  Date medication is needed: 03/26/2021         Action Taken: Message routed to:  [facility] ([facility] Cardio    Travel Screening: Not Applicable

## 2021-03-30 ENCOUNTER — TELEPHONE (OUTPATIENT)
Dept: CARDIOLOGY | Facility: CLINIC | Age: 56
End: 2021-03-30

## 2021-03-30 DIAGNOSIS — I42.2 HYPERTROPHIC NONOBSTRUCTIVE CARDIOMYOPATHY (H): ICD-10-CM

## 2021-03-30 DIAGNOSIS — R06.09 DOE (DYSPNEA ON EXERTION): ICD-10-CM

## 2021-03-30 DIAGNOSIS — I50.22 CHRONIC SYSTOLIC HEART FAILURE (H): ICD-10-CM

## 2021-03-30 DIAGNOSIS — I50.23 ACUTE ON CHRONIC SYSTOLIC CONGESTIVE HEART FAILURE (H): ICD-10-CM

## 2021-03-31 RX ORDER — LISINOPRIL 5 MG/1
5 TABLET ORAL 2 TIMES DAILY
Qty: 180 TABLET | Refills: 0 | Status: SHIPPED | OUTPATIENT
Start: 2021-03-31 | End: 2021-04-01

## 2021-03-31 NOTE — TELEPHONE ENCOUNTER
lisinopril (ZESTRIL) 5 MG tabletTake 1 tablet (5 mg) by mouth 2 times   Last Written Prescription Date:  3/26/20  Last Fill Quantity: 180,   # refills: 2   Last Office Visit : 1/28/2021  Future Office visit:  None   We will plan for today to repeat an echocardiogram, labs, and a CPX.  Plan to see him again in 3 months.       Labs past due Suhail MADDOX( done 11/2019). Labs are ordered( 1/28/21), not completed. 90 day benson refill sent

## 2021-04-01 DIAGNOSIS — I42.2 HYPERTROPHIC NONOBSTRUCTIVE CARDIOMYOPATHY (H): ICD-10-CM

## 2021-04-01 DIAGNOSIS — R06.09 DOE (DYSPNEA ON EXERTION): ICD-10-CM

## 2021-04-01 DIAGNOSIS — I50.22 CHRONIC SYSTOLIC HEART FAILURE (H): ICD-10-CM

## 2021-04-01 DIAGNOSIS — I50.23 ACUTE ON CHRONIC SYSTOLIC CONGESTIVE HEART FAILURE (H): ICD-10-CM

## 2021-04-01 RX ORDER — LISINOPRIL 5 MG/1
5 TABLET ORAL 2 TIMES DAILY
Qty: 180 TABLET | Refills: 2 | Status: SHIPPED | OUTPATIENT
Start: 2021-04-01 | End: 2021-12-22

## 2021-04-10 ENCOUNTER — TELEPHONE (OUTPATIENT)
Dept: CARDIOLOGY | Facility: CLINIC | Age: 56
End: 2021-04-10

## 2021-04-10 NOTE — TELEPHONE ENCOUNTER
LVM for patient to call back to schedule follow up appointment. Patient also needs lab work, echocardiogram, and stress test prior to appointment with MD. [facility] message sent to patient as a reminder to call and schedule follow up appointments.

## 2021-04-15 ENCOUNTER — IMMUNIZATION (OUTPATIENT)
Dept: NURSING | Facility: CLINIC | Age: 56
End: 2021-04-15
Attending: INTERNAL MEDICINE
Payer: COMMERCIAL

## 2021-04-15 PROCEDURE — 0012A PR COVID VAC MODERNA 100 MCG/0.5 ML IM: CPT

## 2021-04-15 PROCEDURE — 91301 PR COVID VAC MODERNA 100 MCG/0.5 ML IM: CPT

## 2021-04-28 ENCOUNTER — ANCILLARY PROCEDURE (OUTPATIENT)
Dept: CARDIOLOGY | Facility: CLINIC | Age: 56
End: 2021-04-28
Attending: INTERNAL MEDICINE
Payer: COMMERCIAL

## 2021-04-28 DIAGNOSIS — Z95.810 ICD (IMPLANTABLE CARDIOVERTER-DEFIBRILLATOR) IN PLACE: ICD-10-CM

## 2021-04-28 DIAGNOSIS — I50.22 CHRONIC SYSTOLIC HEART FAILURE (H): ICD-10-CM

## 2021-04-28 PROCEDURE — 93296 REM INTERROG EVL PM/IDS: CPT

## 2021-04-28 PROCEDURE — 93295 DEV INTERROG REMOTE 1/2/MLT: CPT | Performed by: INTERNAL MEDICINE

## 2021-04-30 ENCOUNTER — TELEPHONE (OUTPATIENT)
Dept: CARDIOLOGY | Facility: CLINIC | Age: 56
End: 2021-04-30

## 2021-04-30 NOTE — TELEPHONE ENCOUNTER
Called and lvm ( seen oxanat and other vm was left) sent letter.  Schedule follow up in July with Dr Aguiar with device check and labs . Please reinstate orders to link

## 2021-05-01 LAB
MDC_IDC_EPISODE_DTM: NORMAL
MDC_IDC_EPISODE_DURATION: 13 S
MDC_IDC_EPISODE_DURATION: 13 S
MDC_IDC_EPISODE_DURATION: 14 S
MDC_IDC_EPISODE_ID: NORMAL
MDC_IDC_EPISODE_TYPE: NORMAL
MDC_IDC_EPISODE_VENDOR_TYPE: NORMAL
MDC_IDC_LEAD_IMPLANT_DT: NORMAL
MDC_IDC_LEAD_LOCATION: NORMAL
MDC_IDC_LEAD_LOCATION_DETAIL_1: NORMAL
MDC_IDC_LEAD_MFG: NORMAL
MDC_IDC_LEAD_MODEL: NORMAL
MDC_IDC_LEAD_POLARITY_TYPE: NORMAL
MDC_IDC_LEAD_SERIAL: NORMAL
MDC_IDC_MSMT_BATTERY_DTM: NORMAL
MDC_IDC_MSMT_BATTERY_REMAINING_LONGEVITY: 144 MO
MDC_IDC_MSMT_BATTERY_REMAINING_PERCENTAGE: 100 %
MDC_IDC_MSMT_BATTERY_STATUS: NORMAL
MDC_IDC_MSMT_CAP_CHARGE_DTM: NORMAL
MDC_IDC_MSMT_CAP_CHARGE_TIME: 10 S
MDC_IDC_MSMT_CAP_CHARGE_TYPE: NORMAL
MDC_IDC_MSMT_LEADCHNL_RV_IMPEDANCE_VALUE: 471 OHM
MDC_IDC_MSMT_LEADCHNL_RV_PACING_THRESHOLD_AMPLITUDE: 0.9 V
MDC_IDC_MSMT_LEADCHNL_RV_PACING_THRESHOLD_PULSEWIDTH: 0.4 MS
MDC_IDC_PG_IMPLANT_DTM: NORMAL
MDC_IDC_PG_MFG: NORMAL
MDC_IDC_PG_MODEL: NORMAL
MDC_IDC_PG_SERIAL: NORMAL
MDC_IDC_PG_TYPE: NORMAL
MDC_IDC_SESS_CLINIC_NAME: NORMAL
MDC_IDC_SESS_DTM: NORMAL
MDC_IDC_SESS_TYPE: NORMAL
MDC_IDC_SET_BRADY_LOWRATE: 40 {BEATS}/MIN
MDC_IDC_SET_BRADY_MODE: NORMAL
MDC_IDC_SET_LEADCHNL_RV_PACING_AMPLITUDE: 2 V
MDC_IDC_SET_LEADCHNL_RV_PACING_POLARITY: NORMAL
MDC_IDC_SET_LEADCHNL_RV_PACING_PULSEWIDTH: 0.4 MS
MDC_IDC_SET_LEADCHNL_RV_SENSING_ADAPTATION_MODE: NORMAL
MDC_IDC_SET_LEADCHNL_RV_SENSING_POLARITY: NORMAL
MDC_IDC_SET_LEADCHNL_RV_SENSING_SENSITIVITY: 0.6 MV
MDC_IDC_SET_ZONE_DETECTION_INTERVAL: 300 MS
MDC_IDC_SET_ZONE_DETECTION_INTERVAL: 353 MS
MDC_IDC_SET_ZONE_TYPE: NORMAL
MDC_IDC_SET_ZONE_TYPE: NORMAL
MDC_IDC_SET_ZONE_VENDOR_TYPE: NORMAL
MDC_IDC_SET_ZONE_VENDOR_TYPE: NORMAL
MDC_IDC_STAT_BRADY_DTM_END: NORMAL
MDC_IDC_STAT_BRADY_DTM_START: NORMAL
MDC_IDC_STAT_BRADY_RV_PERCENT_PACED: 0 %
MDC_IDC_STAT_EPISODE_RECENT_COUNT: 0
MDC_IDC_STAT_EPISODE_RECENT_COUNT: 1
MDC_IDC_STAT_EPISODE_RECENT_COUNT: 2
MDC_IDC_STAT_EPISODE_RECENT_COUNT_DTM_END: NORMAL
MDC_IDC_STAT_EPISODE_RECENT_COUNT_DTM_START: NORMAL
MDC_IDC_STAT_EPISODE_TYPE: NORMAL
MDC_IDC_STAT_EPISODE_VENDOR_TYPE: NORMAL
MDC_IDC_STAT_TACHYTHERAPY_ATP_DELIVERED_RECENT: 0
MDC_IDC_STAT_TACHYTHERAPY_ATP_DELIVERED_TOTAL: 0
MDC_IDC_STAT_TACHYTHERAPY_RECENT_DTM_END: NORMAL
MDC_IDC_STAT_TACHYTHERAPY_RECENT_DTM_START: NORMAL
MDC_IDC_STAT_TACHYTHERAPY_SHOCKS_ABORTED_RECENT: 0
MDC_IDC_STAT_TACHYTHERAPY_SHOCKS_ABORTED_TOTAL: 0
MDC_IDC_STAT_TACHYTHERAPY_SHOCKS_DELIVERED_RECENT: 0
MDC_IDC_STAT_TACHYTHERAPY_SHOCKS_DELIVERED_TOTAL: 0
MDC_IDC_STAT_TACHYTHERAPY_TOTAL_DTM_END: NORMAL
MDC_IDC_STAT_TACHYTHERAPY_TOTAL_DTM_START: NORMAL

## 2021-06-30 DIAGNOSIS — R06.09 DOE (DYSPNEA ON EXERTION): ICD-10-CM

## 2021-06-30 DIAGNOSIS — I50.23 ACUTE ON CHRONIC SYSTOLIC CONGESTIVE HEART FAILURE (H): ICD-10-CM

## 2021-06-30 DIAGNOSIS — I42.2 HYPERTROPHIC NONOBSTRUCTIVE CARDIOMYOPATHY (H): ICD-10-CM

## 2021-06-30 DIAGNOSIS — I50.22 CHRONIC SYSTOLIC HEART FAILURE (H): ICD-10-CM

## 2021-07-05 RX ORDER — SPIRONOLACTONE 25 MG/1
12.5 TABLET ORAL DAILY
Qty: 45 TABLET | Refills: 1 | Status: SHIPPED | OUTPATIENT
Start: 2021-07-05 | End: 2021-12-22

## 2021-07-05 NOTE — TELEPHONE ENCOUNTER
SPIRONOLACTONE 25MG TABLETS  Last Written Prescription Date:  2/2/2021  Last Fill Quantity: 45,   # refills: 1  Last Office Visit : 1/28/2021  Future Office visit:  None  45 Tabs, 1 Refill sent to pharm 7/5/2021      Camille Lafleur RN  Central Triage Red Flags/Med Refills

## 2021-08-20 DIAGNOSIS — I50.23 ACUTE ON CHRONIC SYSTOLIC CONGESTIVE HEART FAILURE (H): ICD-10-CM

## 2021-08-20 DIAGNOSIS — R06.09 DOE (DYSPNEA ON EXERTION): ICD-10-CM

## 2021-08-20 DIAGNOSIS — N52.9 ERECTILE DYSFUNCTION, UNSPECIFIED ERECTILE DYSFUNCTION TYPE: ICD-10-CM

## 2021-08-23 RX ORDER — ALBUTEROL SULFATE 90 UG/1
AEROSOL, METERED RESPIRATORY (INHALATION)
Qty: 18 G | Refills: 11 | Status: CANCELLED | OUTPATIENT
Start: 2021-08-23

## 2021-08-23 NOTE — TELEPHONE ENCOUNTER
Reason for Call:  Medication or medication refill:    Do you use a [facility]?  Name of the pharmacy and phone number for the current request:  [facility] [address] [place] [address]  [phone]    Name of the medication requested: attached    Other request: Patient needs refills on attached medication, Advised to schedule with a new PCP. Please advise thank you!    Can we leave a detailed message on this number? YES    Phone number patient can be reached at: Cell number on file:    Telephone Information:   Mobile [phone]       Best Time: any    Call taken on 8/23/2021 at 3:00 PM by Cleopatra Trejo

## 2021-08-24 DIAGNOSIS — N52.9 ERECTILE DYSFUNCTION, UNSPECIFIED ERECTILE DYSFUNCTION TYPE: ICD-10-CM

## 2021-08-24 RX ORDER — SILDENAFIL 50 MG/1
50 TABLET, FILM COATED ORAL DAILY PRN
Qty: 10 TABLET | Refills: 3 | Status: SHIPPED | OUTPATIENT
Start: 2021-08-24 | End: 2021-10-08

## 2021-08-25 RX ORDER — SILDENAFIL 50 MG/1
TABLET, FILM COATED ORAL
Qty: 0.1 TABLET | Refills: 0 | OUTPATIENT
Start: 2021-08-25

## 2021-09-15 ENCOUNTER — ANCILLARY PROCEDURE (OUTPATIENT)
Dept: CARDIOLOGY | Facility: CLINIC | Age: 56
End: 2021-09-15
Attending: INTERNAL MEDICINE
Payer: COMMERCIAL

## 2021-09-15 DIAGNOSIS — I42.9 CARDIOMYOPATHY (H): ICD-10-CM

## 2021-09-15 DIAGNOSIS — I50.22 CHRONIC SYSTOLIC HEART FAILURE (H): ICD-10-CM

## 2021-09-15 DIAGNOSIS — Z95.810 ICD (IMPLANTABLE CARDIOVERTER-DEFIBRILLATOR) IN PLACE: ICD-10-CM

## 2021-09-15 PROCEDURE — 93296 REM INTERROG EVL PM/IDS: CPT

## 2021-09-15 PROCEDURE — 93295 DEV INTERROG REMOTE 1/2/MLT: CPT | Performed by: INTERNAL MEDICINE

## 2021-09-27 LAB
MDC_IDC_EPISODE_DTM: NORMAL
MDC_IDC_EPISODE_DURATION: 14 S
MDC_IDC_EPISODE_DURATION: 15 S
MDC_IDC_EPISODE_ID: NORMAL
MDC_IDC_EPISODE_TYPE: NORMAL
MDC_IDC_EPISODE_VENDOR_TYPE: NORMAL
MDC_IDC_EPISODE_VENDOR_TYPE: NORMAL
MDC_IDC_LEAD_IMPLANT_DT: NORMAL
MDC_IDC_LEAD_LOCATION: NORMAL
MDC_IDC_LEAD_LOCATION_DETAIL_1: NORMAL
MDC_IDC_LEAD_MFG: NORMAL
MDC_IDC_LEAD_MODEL: NORMAL
MDC_IDC_LEAD_POLARITY_TYPE: NORMAL
MDC_IDC_LEAD_SERIAL: NORMAL
MDC_IDC_MSMT_BATTERY_DTM: NORMAL
MDC_IDC_MSMT_BATTERY_REMAINING_LONGEVITY: 144 MO
MDC_IDC_MSMT_BATTERY_REMAINING_PERCENTAGE: 100 %
MDC_IDC_MSMT_BATTERY_STATUS: NORMAL
MDC_IDC_MSMT_CAP_CHARGE_DTM: NORMAL
MDC_IDC_MSMT_CAP_CHARGE_TIME: 10.1 S
MDC_IDC_MSMT_CAP_CHARGE_TYPE: NORMAL
MDC_IDC_MSMT_LEADCHNL_RV_IMPEDANCE_VALUE: 453 OHM
MDC_IDC_PG_IMPLANT_DTM: NORMAL
MDC_IDC_PG_MFG: NORMAL
MDC_IDC_PG_MODEL: NORMAL
MDC_IDC_PG_SERIAL: NORMAL
MDC_IDC_PG_TYPE: NORMAL
MDC_IDC_SESS_CLINIC_NAME: NORMAL
MDC_IDC_SESS_DTM: NORMAL
MDC_IDC_SESS_TYPE: NORMAL
MDC_IDC_SET_BRADY_LOWRATE: 40 {BEATS}/MIN
MDC_IDC_SET_BRADY_MODE: NORMAL
MDC_IDC_SET_LEADCHNL_RV_PACING_AMPLITUDE: 2 V
MDC_IDC_SET_LEADCHNL_RV_PACING_POLARITY: NORMAL
MDC_IDC_SET_LEADCHNL_RV_PACING_PULSEWIDTH: 0.4 MS
MDC_IDC_SET_LEADCHNL_RV_SENSING_ADAPTATION_MODE: NORMAL
MDC_IDC_SET_LEADCHNL_RV_SENSING_POLARITY: NORMAL
MDC_IDC_SET_LEADCHNL_RV_SENSING_SENSITIVITY: 0.6 MV
MDC_IDC_SET_ZONE_DETECTION_INTERVAL: 300 MS
MDC_IDC_SET_ZONE_DETECTION_INTERVAL: 353 MS
MDC_IDC_SET_ZONE_TYPE: NORMAL
MDC_IDC_SET_ZONE_TYPE: NORMAL
MDC_IDC_SET_ZONE_VENDOR_TYPE: NORMAL
MDC_IDC_SET_ZONE_VENDOR_TYPE: NORMAL
MDC_IDC_STAT_BRADY_DTM_END: NORMAL
MDC_IDC_STAT_BRADY_DTM_START: NORMAL
MDC_IDC_STAT_BRADY_RV_PERCENT_PACED: 0 %
MDC_IDC_STAT_EPISODE_RECENT_COUNT: 0
MDC_IDC_STAT_EPISODE_RECENT_COUNT: 2
MDC_IDC_STAT_EPISODE_RECENT_COUNT: 3
MDC_IDC_STAT_EPISODE_RECENT_COUNT_DTM_END: NORMAL
MDC_IDC_STAT_EPISODE_RECENT_COUNT_DTM_START: NORMAL
MDC_IDC_STAT_EPISODE_TYPE: NORMAL
MDC_IDC_STAT_EPISODE_VENDOR_TYPE: NORMAL
MDC_IDC_STAT_TACHYTHERAPY_ATP_DELIVERED_RECENT: 0
MDC_IDC_STAT_TACHYTHERAPY_ATP_DELIVERED_TOTAL: 0
MDC_IDC_STAT_TACHYTHERAPY_RECENT_DTM_END: NORMAL
MDC_IDC_STAT_TACHYTHERAPY_RECENT_DTM_START: NORMAL
MDC_IDC_STAT_TACHYTHERAPY_SHOCKS_ABORTED_RECENT: 0
MDC_IDC_STAT_TACHYTHERAPY_SHOCKS_ABORTED_TOTAL: 0
MDC_IDC_STAT_TACHYTHERAPY_SHOCKS_DELIVERED_RECENT: 0
MDC_IDC_STAT_TACHYTHERAPY_SHOCKS_DELIVERED_TOTAL: 0
MDC_IDC_STAT_TACHYTHERAPY_TOTAL_DTM_END: NORMAL
MDC_IDC_STAT_TACHYTHERAPY_TOTAL_DTM_START: NORMAL

## 2021-10-08 DIAGNOSIS — N52.9 ERECTILE DYSFUNCTION, UNSPECIFIED ERECTILE DYSFUNCTION TYPE: ICD-10-CM

## 2021-10-08 RX ORDER — SILDENAFIL 50 MG/1
TABLET, FILM COATED ORAL
Qty: 20 TABLET | Refills: 0 | Status: SHIPPED | OUTPATIENT
Start: 2021-10-08 | End: 2021-10-13

## 2021-10-11 PROBLEM — R91.8 PULMONARY NODULES: Status: RESOLVED | Noted: 2017-01-17 | Resolved: 2021-10-11

## 2021-10-11 NOTE — PATIENT INSTRUCTIONS
I kindly request that you check your MyChart prior to all appointments with me and complete any assigned questionnaires ahead of time.  (Or you can come a bit early to your appointment and complete questionnaires on one of our tablets.)  This frees up more of our designated visit time to address your health issues. If you have not already done so, I encourage you to sign up for Mychart ([email or web address]).  This will allow you to review your results, securely communicate with your provider care team, and schedule virtual visits as well.    FYI:  I do telehealth (video) visits exclusively on Wednesdays.  I still do in-person visits at [facility] ([phone]) on Mondays, Tuesdays and Thursdays.  You can schedule a video visit for many conditions.  Please follow this link:  [email or web address]    To schedule any ordered imaging studies (including mammogram) you can call [facility] Scheduling at [phone].  If you are scheduling a DEXA (bone density) scan please do NOT schedule this at the [facility].  Please ask that it be done at [facility] in [place].  Other preferred DEXA locations include [facility] (at the [facility] [place]), [place], or [facility].

## 2021-10-13 ENCOUNTER — VIRTUAL VISIT (OUTPATIENT)
Dept: FAMILY MEDICINE | Facility: CLINIC | Age: 56
End: 2021-10-13
Payer: COMMERCIAL

## 2021-10-13 DIAGNOSIS — N52.9 ERECTILE DYSFUNCTION, UNSPECIFIED ERECTILE DYSFUNCTION TYPE: Primary | ICD-10-CM

## 2021-10-13 DIAGNOSIS — R06.09 DOE (DYSPNEA ON EXERTION): ICD-10-CM

## 2021-10-13 DIAGNOSIS — I50.20 SEVERE SYSTOLIC CONGESTIVE HEART FAILURE (H): ICD-10-CM

## 2021-10-13 PROBLEM — N18.30 CKD (CHRONIC KIDNEY DISEASE) STAGE 3, GFR 30-59 ML/MIN (H): Status: RESOLVED | Noted: 2020-01-30 | Resolved: 2021-10-13

## 2021-10-13 PROCEDURE — 99214 OFFICE O/P EST MOD 30 MIN: CPT | Performed by: FAMILY MEDICINE

## 2021-10-13 RX ORDER — SILDENAFIL 50 MG/1
TABLET, FILM COATED ORAL
Qty: 20 TABLET | Refills: 11 | Status: SHIPPED | OUTPATIENT
Start: 2021-10-13 | End: 2021-10-29

## 2021-10-13 RX ORDER — ALBUTEROL SULFATE 90 UG/1
AEROSOL, METERED RESPIRATORY (INHALATION)
Qty: 18 G | Refills: 3 | Status: SHIPPED | OUTPATIENT
Start: 2021-10-13 | End: 2022-02-01

## 2021-10-13 NOTE — PROGRESS NOTES
"Akshat is a 56 year old who is being evaluated via a billable telephone visit.      What phone number would you like to be contacted at? [phone]  How would you like to obtain your AVS? MyChart    Assessment & Plan       ICD-10-CM    1. Erectile dysfunction, unspecified erectile dysfunction type  N52.9 sildenafil (VIAGRA) 50 MG tablet   2. JOHNSON (dyspnea on exertion)  R06.00 albuterol (PROAIR HFA/PROVENTIL HFA/VENTOLIN HFA) 108 (90 Base) MCG/ACT inhaler   3. Severe systolic congestive heart failure (H)  I50.20       I recommended he get flu shot and, once approved, his Moderna COVID booster.  Discussed that FDA approval may come as soon as next week.     Tobacco Cessation:   reports that he has been smoking cigarettes. He has a 3.75 pack-year smoking history. He quit smokeless tobacco use about 9 years ago.  Tobacco Cessation Action Plan: He is working on tobacco cessation through [facility]     No follow-ups on file.    Camille Fajardo MD  [facility]        Subjective   Akshat is a 56 year old who presents for the following health issues     HPI       Establish care    His PCP Denise White NP, has retired.  Most of his care now is through [facility] as he has severe, advanced HFrEF with most recent EF of 12% (echo Feb 2021).  He is hoping to get on transplant wait list - probably in [place].  He is prepared to move to [place] if it increases his chances of receiving a heart.    He also has episodic acute LBP but hasn't had a flare of his back pain for many months.    He uses viagra and requests refill.    Review of Systems         Objective    Vitals - Patient Reported  Weight (Patient Reported): 80.7 kg (178 lb)  Height (Patient Reported): 170.2 cm (5' 7\")  BMI (Based on Pt Reported Ht/Wt): 27.88    Physical Exam   GEN:  no apparent distress  PSYCH: Alert and engaged; coherent speech with normal rate and volume; able to articulate logical thoughts, no tangential thoughts, no apparent " hallucinations or delusions; affect is normal  RESP: No cough, no audible wheezing, able to talk in full sentences  Remainder of exam unable to be completed due to telephone visits         Phone call duration: 15 minutes  2:25 PM  2:40 PM

## 2021-10-24 ENCOUNTER — HEALTH MAINTENANCE LETTER (OUTPATIENT)
Age: 56
End: 2021-10-24

## 2021-10-28 DIAGNOSIS — N52.9 ERECTILE DYSFUNCTION, UNSPECIFIED ERECTILE DYSFUNCTION TYPE: ICD-10-CM

## 2021-10-29 RX ORDER — SILDENAFIL 50 MG/1
TABLET, FILM COATED ORAL
Qty: 20 TABLET | Refills: 0 | Status: SHIPPED | OUTPATIENT
Start: 2021-10-29 | End: 2022-04-12

## 2021-11-03 ENCOUNTER — TELEPHONE (OUTPATIENT)
Dept: FAMILY MEDICINE | Facility: CLINIC | Age: 56
End: 2021-11-03

## 2021-11-03 NOTE — TELEPHONE ENCOUNTER
Writer called patient and informed patient controlled substance refill requests need to be addressed within a provider visit.    Patient verbalized understanding and in agreement with plan.    Patient stated he is experiencing the same type of flare up of right sided sciatic pain which he has had in the past.    No available appts this week at [facility].    Discussed options for evaluation today:  1. Writer can transfer patient's call to Central Scheduling to assist with finding an appointment  2. Urgent Care evaluation    Patient stated he will go to [facility] today.    CARISA MarinN, RN  [facility]

## 2021-11-03 NOTE — TELEPHONE ENCOUNTER
Patient having sciatic issues and asking for a prescription for hydrocodone to be sent to [facility] [address]    He tried to see you today or tomorrow and no openings    Call to advise  [phone]  Ok to leave message

## 2021-12-07 DIAGNOSIS — R06.09 DOE (DYSPNEA ON EXERTION): ICD-10-CM

## 2021-12-09 RX ORDER — ALBUTEROL SULFATE 90 UG/1
AEROSOL, METERED RESPIRATORY (INHALATION)
Qty: 18 G | Refills: 3 | OUTPATIENT
Start: 2021-12-09

## 2021-12-09 NOTE — TELEPHONE ENCOUNTER
Refused; pt received fill for one year on 10/13/21.    Sammi Taylor RN  [facility]

## 2021-12-22 DIAGNOSIS — R06.09 DOE (DYSPNEA ON EXERTION): ICD-10-CM

## 2021-12-22 DIAGNOSIS — I42.2 HYPERTROPHIC NONOBSTRUCTIVE CARDIOMYOPATHY (H): ICD-10-CM

## 2021-12-22 DIAGNOSIS — I50.23 ACUTE ON CHRONIC SYSTOLIC CONGESTIVE HEART FAILURE (H): ICD-10-CM

## 2021-12-22 DIAGNOSIS — I50.22 CHRONIC SYSTOLIC HEART FAILURE (H): ICD-10-CM

## 2021-12-22 RX ORDER — SPIRONOLACTONE 25 MG/1
12.5 TABLET ORAL DAILY
Qty: 45 TABLET | Refills: 1 | Status: SHIPPED | OUTPATIENT
Start: 2021-12-22 | End: 2022-03-02

## 2021-12-22 RX ORDER — LISINOPRIL 5 MG/1
5 TABLET ORAL 2 TIMES DAILY
Qty: 180 TABLET | Refills: 1 | Status: SHIPPED | OUTPATIENT
Start: 2021-12-22 | End: 2022-07-12

## 2021-12-22 RX ORDER — CARVEDILOL 12.5 MG/1
12.5 TABLET ORAL 2 TIMES DAILY WITH MEALS
Qty: 180 TABLET | Refills: 1 | Status: SHIPPED | OUTPATIENT
Start: 2021-12-22 | End: 2022-07-12

## 2022-01-28 ENCOUNTER — ANCILLARY PROCEDURE (OUTPATIENT)
Dept: CARDIOLOGY | Facility: CLINIC | Age: 57
End: 2022-01-28
Attending: INTERNAL MEDICINE
Payer: COMMERCIAL

## 2022-01-28 DIAGNOSIS — Z95.810 ICD (IMPLANTABLE CARDIOVERTER-DEFIBRILLATOR) IN PLACE: ICD-10-CM

## 2022-01-28 DIAGNOSIS — I42.9 CARDIOMYOPATHY (H): ICD-10-CM

## 2022-01-28 DIAGNOSIS — I50.22 CHRONIC SYSTOLIC HEART FAILURE (H): ICD-10-CM

## 2022-01-28 PROCEDURE — 93295 DEV INTERROG REMOTE 1/2/MLT: CPT | Performed by: INTERNAL MEDICINE

## 2022-01-28 PROCEDURE — 93296 REM INTERROG EVL PM/IDS: CPT

## 2022-02-01 DIAGNOSIS — R06.09 DOE (DYSPNEA ON EXERTION): ICD-10-CM

## 2022-02-01 RX ORDER — ALBUTEROL SULFATE 90 UG/1
AEROSOL, METERED RESPIRATORY (INHALATION)
Qty: 18 G | Refills: 3 | Status: SHIPPED | OUTPATIENT
Start: 2022-02-01 | End: 2022-04-12

## 2022-02-01 NOTE — TELEPHONE ENCOUNTER
Prescription approved per [facility] Refill Protocol.    CARISA MarinN, RN  [facility]

## 2022-02-02 DIAGNOSIS — R06.09 DOE (DYSPNEA ON EXERTION): ICD-10-CM

## 2022-02-02 DIAGNOSIS — I50.22 CHRONIC SYSTOLIC HEART FAILURE (H): ICD-10-CM

## 2022-02-02 DIAGNOSIS — I42.2 HYPERTROPHIC NONOBSTRUCTIVE CARDIOMYOPATHY (H): ICD-10-CM

## 2022-02-02 DIAGNOSIS — I50.23 ACUTE ON CHRONIC SYSTOLIC CONGESTIVE HEART FAILURE (H): ICD-10-CM

## 2022-02-02 RX ORDER — FUROSEMIDE 20 MG
20 TABLET ORAL 2 TIMES DAILY
Qty: 60 TABLET | Refills: 1 | Status: SHIPPED | OUTPATIENT
Start: 2022-02-02 | End: 2022-04-01

## 2022-02-13 ENCOUNTER — HEALTH MAINTENANCE LETTER (OUTPATIENT)
Age: 57
End: 2022-02-13

## 2022-03-02 DIAGNOSIS — I50.23 ACUTE ON CHRONIC SYSTOLIC CONGESTIVE HEART FAILURE (H): ICD-10-CM

## 2022-03-02 DIAGNOSIS — I42.2 HYPERTROPHIC NONOBSTRUCTIVE CARDIOMYOPATHY (H): ICD-10-CM

## 2022-03-02 DIAGNOSIS — I50.22 CHRONIC SYSTOLIC HEART FAILURE (H): ICD-10-CM

## 2022-03-02 DIAGNOSIS — R06.09 DOE (DYSPNEA ON EXERTION): ICD-10-CM

## 2022-03-02 RX ORDER — SPIRONOLACTONE 25 MG/1
12.5 TABLET ORAL DAILY
Qty: 45 TABLET | Refills: 1 | Status: ON HOLD | OUTPATIENT
Start: 2022-03-02 | End: 2023-04-09

## 2022-03-15 LAB
MDC_IDC_EPISODE_DTM: NORMAL
MDC_IDC_EPISODE_DURATION: 14 S
MDC_IDC_EPISODE_DURATION: 16 S
MDC_IDC_EPISODE_DURATION: 16 S
MDC_IDC_EPISODE_ID: NORMAL
MDC_IDC_EPISODE_TYPE: NORMAL
MDC_IDC_EPISODE_VENDOR_TYPE: NORMAL
MDC_IDC_LEAD_IMPLANT_DT: NORMAL
MDC_IDC_LEAD_LOCATION: NORMAL
MDC_IDC_LEAD_LOCATION_DETAIL_1: NORMAL
MDC_IDC_LEAD_MFG: NORMAL
MDC_IDC_LEAD_MODEL: NORMAL
MDC_IDC_LEAD_POLARITY_TYPE: NORMAL
MDC_IDC_LEAD_SERIAL: NORMAL
MDC_IDC_MSMT_BATTERY_DTM: NORMAL
MDC_IDC_MSMT_BATTERY_REMAINING_LONGEVITY: 144 MO
MDC_IDC_MSMT_BATTERY_REMAINING_PERCENTAGE: 100 %
MDC_IDC_MSMT_BATTERY_STATUS: NORMAL
MDC_IDC_MSMT_CAP_CHARGE_DTM: NORMAL
MDC_IDC_MSMT_CAP_CHARGE_TIME: 10.2 S
MDC_IDC_MSMT_CAP_CHARGE_TYPE: NORMAL
MDC_IDC_MSMT_LEADCHNL_RV_IMPEDANCE_VALUE: 457 OHM
MDC_IDC_PG_IMPLANT_DTM: NORMAL
MDC_IDC_PG_MFG: NORMAL
MDC_IDC_PG_MODEL: NORMAL
MDC_IDC_PG_SERIAL: NORMAL
MDC_IDC_PG_TYPE: NORMAL
MDC_IDC_SESS_CLINIC_NAME: NORMAL
MDC_IDC_SESS_DTM: NORMAL
MDC_IDC_SESS_TYPE: NORMAL
MDC_IDC_SET_BRADY_LOWRATE: 40 {BEATS}/MIN
MDC_IDC_SET_BRADY_MODE: NORMAL
MDC_IDC_SET_LEADCHNL_RV_PACING_AMPLITUDE: 2 V
MDC_IDC_SET_LEADCHNL_RV_PACING_POLARITY: NORMAL
MDC_IDC_SET_LEADCHNL_RV_PACING_PULSEWIDTH: 0.4 MS
MDC_IDC_SET_LEADCHNL_RV_SENSING_ADAPTATION_MODE: NORMAL
MDC_IDC_SET_LEADCHNL_RV_SENSING_POLARITY: NORMAL
MDC_IDC_SET_LEADCHNL_RV_SENSING_SENSITIVITY: 0.6 MV
MDC_IDC_SET_ZONE_DETECTION_INTERVAL: 300 MS
MDC_IDC_SET_ZONE_DETECTION_INTERVAL: 353 MS
MDC_IDC_SET_ZONE_TYPE: NORMAL
MDC_IDC_SET_ZONE_TYPE: NORMAL
MDC_IDC_SET_ZONE_VENDOR_TYPE: NORMAL
MDC_IDC_SET_ZONE_VENDOR_TYPE: NORMAL
MDC_IDC_STAT_BRADY_DTM_END: NORMAL
MDC_IDC_STAT_BRADY_DTM_START: NORMAL
MDC_IDC_STAT_BRADY_RV_PERCENT_PACED: 0 %
MDC_IDC_STAT_EPISODE_RECENT_COUNT: 0
MDC_IDC_STAT_EPISODE_RECENT_COUNT: 10
MDC_IDC_STAT_EPISODE_RECENT_COUNT: 2
MDC_IDC_STAT_EPISODE_RECENT_COUNT_DTM_END: NORMAL
MDC_IDC_STAT_EPISODE_RECENT_COUNT_DTM_START: NORMAL
MDC_IDC_STAT_EPISODE_TYPE: NORMAL
MDC_IDC_STAT_EPISODE_VENDOR_TYPE: NORMAL
MDC_IDC_STAT_TACHYTHERAPY_ATP_DELIVERED_RECENT: 0
MDC_IDC_STAT_TACHYTHERAPY_ATP_DELIVERED_TOTAL: 0
MDC_IDC_STAT_TACHYTHERAPY_RECENT_DTM_END: NORMAL
MDC_IDC_STAT_TACHYTHERAPY_RECENT_DTM_START: NORMAL
MDC_IDC_STAT_TACHYTHERAPY_SHOCKS_ABORTED_RECENT: 0
MDC_IDC_STAT_TACHYTHERAPY_SHOCKS_ABORTED_TOTAL: 0
MDC_IDC_STAT_TACHYTHERAPY_SHOCKS_DELIVERED_RECENT: 0
MDC_IDC_STAT_TACHYTHERAPY_SHOCKS_DELIVERED_TOTAL: 0
MDC_IDC_STAT_TACHYTHERAPY_TOTAL_DTM_END: NORMAL
MDC_IDC_STAT_TACHYTHERAPY_TOTAL_DTM_START: NORMAL

## 2022-03-30 DIAGNOSIS — I50.23 ACUTE ON CHRONIC SYSTOLIC CONGESTIVE HEART FAILURE (H): ICD-10-CM

## 2022-03-30 DIAGNOSIS — R06.09 DOE (DYSPNEA ON EXERTION): ICD-10-CM

## 2022-03-30 DIAGNOSIS — I42.2 HYPERTROPHIC NONOBSTRUCTIVE CARDIOMYOPATHY (H): ICD-10-CM

## 2022-03-30 DIAGNOSIS — I50.22 CHRONIC SYSTOLIC HEART FAILURE (H): ICD-10-CM

## 2022-04-01 DIAGNOSIS — R06.09 DOE (DYSPNEA ON EXERTION): ICD-10-CM

## 2022-04-01 DIAGNOSIS — I50.22 CHRONIC SYSTOLIC HEART FAILURE (H): ICD-10-CM

## 2022-04-01 DIAGNOSIS — I42.2 HYPERTROPHIC NONOBSTRUCTIVE CARDIOMYOPATHY (H): ICD-10-CM

## 2022-04-01 DIAGNOSIS — I50.23 ACUTE ON CHRONIC SYSTOLIC CONGESTIVE HEART FAILURE (H): ICD-10-CM

## 2022-04-01 RX ORDER — FUROSEMIDE 20 MG
TABLET ORAL
Qty: 60 TABLET | Refills: 1 | Status: SHIPPED | OUTPATIENT
Start: 2022-04-01 | End: 2022-04-05

## 2022-04-01 NOTE — TELEPHONE ENCOUNTER
FUROSEMIDE 20MG TABLETS Take 1 tablet (20 mg) by mouth 2 times daily Only take if weight up by 3lbs   Last Written Prescription Date:  2/2/22  Last Fill Quantity: 60,   # refills: 1  Last Office Visit : 1/28/21  Future Office visit:  3 months    Routing refill request to provider for review/approval because:  Labs  due and appt due( last 1/28/21)  Care Everywhere:  @ [facility]  3/8/21   K =5   2/24/21  Cr= 1.18   Wz=635

## 2022-04-05 DIAGNOSIS — R06.09 DOE (DYSPNEA ON EXERTION): ICD-10-CM

## 2022-04-05 RX ORDER — ALBUTEROL SULFATE 90 UG/1
AEROSOL, METERED RESPIRATORY (INHALATION)
Qty: 18 G | Refills: 3 | OUTPATIENT
Start: 2022-04-05

## 2022-04-05 NOTE — TELEPHONE ENCOUNTER
FUROSEMIDE 20MG TABLETS   Last Written Prescription Date:  4/1/2022  Last Fill Quantity: 60,   # refills: 1  Last Office Visit :  1/28/2021  Future Office visit:  None    Routing refill request to provider for review/approval because:  Second Request  Office visits and labs due: Cr, Na, K+  Refer to clinic for review       Camille Lafleur RN  Central Triage Red Flags/Med Refills

## 2022-04-05 NOTE — TELEPHONE ENCOUNTER
Albuterol refill request denied as duplicate    Raquel Thorpe, RN, BSN  [facility]

## 2022-04-08 RX ORDER — FUROSEMIDE 20 MG
20 TABLET ORAL 2 TIMES DAILY
Qty: 180 TABLET | Refills: 1 | Status: ON HOLD | OUTPATIENT
Start: 2022-04-08 | End: 2023-04-09

## 2022-04-12 DIAGNOSIS — J44.9 COPD, SEVERE (H): Primary | ICD-10-CM

## 2022-04-12 DIAGNOSIS — R06.09 DOE (DYSPNEA ON EXERTION): ICD-10-CM

## 2022-04-12 DIAGNOSIS — N52.9 ERECTILE DYSFUNCTION, UNSPECIFIED ERECTILE DYSFUNCTION TYPE: ICD-10-CM

## 2022-04-12 RX ORDER — ALBUTEROL SULFATE 90 UG/1
AEROSOL, METERED RESPIRATORY (INHALATION)
Qty: 18 G | Refills: 11 | Status: ON HOLD | OUTPATIENT
Start: 2022-04-12 | End: 2023-04-09

## 2022-04-12 RX ORDER — SILDENAFIL 50 MG/1
TABLET, FILM COATED ORAL
Qty: 20 TABLET | Refills: 0 | Status: SHIPPED | OUTPATIENT
Start: 2022-04-12 | End: 2022-05-20

## 2022-04-12 NOTE — TELEPHONE ENCOUNTER
Dr Fajardo,    When he was told that there was a referral placed for pulmonogy . Pt states he has seen a pulmonologist one year at [facility] and they never told him he has COPD. He says his heart causes his breathing issues and he needs a heart transplant. He is followed at [facility] as well.        Raquel Thorpe RN, BSN  [facility]

## 2022-04-12 NOTE — TELEPHONE ENCOUNTER
I am aware of his heart conditon.  I am not aware of congestive heart failure causing obstructive lung pathology on pulmonary function testing.  If his breathing issues are solely related to CHF albuterol should not help him as it is a bronchodilator.    Camille Fajardo MD

## 2022-04-12 NOTE — TELEPHONE ENCOUNTER
I have only met him once.  On review of his chart I see that he has severe COPD by his last PFT's (2018).  Prior PFT's from 2012 did not show bronchodilator response although his FEV1 did improve 180 ml (just below threshold for bronchodilator responsiveness).      PFT's 4/10/2012:  FEV1/FVC:  64%    FEV1:   73% predicted    PFT's 11/19/2018  FEV1/FVC:  48%  FEV1:   41% predicted    He should be seeing a pulmonologist.  I have referred him for that.      Please clarify his tobacco history.  Is he still smoking?  If not, when did he quit?  How many years did he smoke and how much?      Camille Fajardo MD  [facility]

## 2022-04-12 NOTE — TELEPHONE ENCOUNTER
Dr Fajardo,    How do you want RN to handle this? He does not want the pulmonary referral. Should he make appt to discuss with you? He has the refills now so he may not feel a sense of urgency. If nothing else the RN can call pt to let him know that he now has dx of COPD.     Raquel Thorpe RN, BSN  [facility]

## 2022-04-12 NOTE — TELEPHONE ENCOUNTER
Writer called patient:  Left message to call back and ask to speak with an available triage nurse.  SARAH BETH Marin, RN  [facility]

## 2022-04-12 NOTE — TELEPHONE ENCOUNTER
Pt has dyspnea on exertion , no asthma dx or ACT on record  Has albuterol on current and past med list  Last seen 10/13/21 by PCP Scotty      Refill request to MD/primary care provider for albuterol  Raquel Thorpe RN

## 2022-04-13 NOTE — TELEPHONE ENCOUNTER
I will reach out to the last [facility] Cardiologist he saw in 2021 and see if they feel that his severe obstructive deficit on PFT's can be secondary to his cardiomyopathy.

## 2022-04-14 NOTE — TELEPHONE ENCOUNTER
Dr. Aguiar (last cardiologist he has seen at [facility]) feels his obstructive deficit on pulmonary function testing represents COPD rather than HF.      Camille Fajardo MD

## 2022-05-19 ENCOUNTER — ANCILLARY PROCEDURE (OUTPATIENT)
Dept: CARDIOLOGY | Facility: CLINIC | Age: 57
End: 2022-05-19
Attending: INTERNAL MEDICINE
Payer: COMMERCIAL

## 2022-05-19 DIAGNOSIS — I50.22 CHRONIC SYSTOLIC HEART FAILURE (H): ICD-10-CM

## 2022-05-19 DIAGNOSIS — Z95.810 ICD (IMPLANTABLE CARDIOVERTER-DEFIBRILLATOR) IN PLACE: ICD-10-CM

## 2022-05-19 DIAGNOSIS — I42.9 CARDIOMYOPATHY (H): ICD-10-CM

## 2022-05-19 DIAGNOSIS — N52.9 ERECTILE DYSFUNCTION, UNSPECIFIED ERECTILE DYSFUNCTION TYPE: ICD-10-CM

## 2022-05-19 PROCEDURE — 93295 DEV INTERROG REMOTE 1/2/MLT: CPT | Performed by: INTERNAL MEDICINE

## 2022-05-19 PROCEDURE — 93296 REM INTERROG EVL PM/IDS: CPT

## 2022-05-20 RX ORDER — SILDENAFIL 50 MG/1
TABLET, FILM COATED ORAL
Qty: 20 TABLET | Refills: 0 | Status: SHIPPED | OUTPATIENT
Start: 2022-05-20 | End: 2022-07-25

## 2022-05-20 NOTE — TELEPHONE ENCOUNTER
Prescription approved per [facility] Refill Protocol.  LAURA Taylor RN  [facility]

## 2022-05-26 LAB
MDC_IDC_EPISODE_DTM: NORMAL
MDC_IDC_EPISODE_DTM: NORMAL
MDC_IDC_EPISODE_DURATION: 14 S
MDC_IDC_EPISODE_ID: NORMAL
MDC_IDC_EPISODE_ID: NORMAL
MDC_IDC_EPISODE_TYPE: NORMAL
MDC_IDC_EPISODE_TYPE: NORMAL
MDC_IDC_EPISODE_VENDOR_TYPE: NORMAL
MDC_IDC_LEAD_IMPLANT_DT: NORMAL
MDC_IDC_LEAD_LOCATION: NORMAL
MDC_IDC_LEAD_LOCATION_DETAIL_1: NORMAL
MDC_IDC_LEAD_MFG: NORMAL
MDC_IDC_LEAD_MODEL: NORMAL
MDC_IDC_LEAD_POLARITY_TYPE: NORMAL
MDC_IDC_LEAD_SERIAL: NORMAL
MDC_IDC_MSMT_BATTERY_DTM: NORMAL
MDC_IDC_MSMT_BATTERY_REMAINING_LONGEVITY: 138 MO
MDC_IDC_MSMT_BATTERY_REMAINING_PERCENTAGE: 100 %
MDC_IDC_MSMT_BATTERY_STATUS: NORMAL
MDC_IDC_MSMT_CAP_CHARGE_DTM: NORMAL
MDC_IDC_MSMT_CAP_CHARGE_TIME: 10.3 S
MDC_IDC_MSMT_CAP_CHARGE_TYPE: NORMAL
MDC_IDC_MSMT_LEADCHNL_RV_IMPEDANCE_VALUE: 424 OHM
MDC_IDC_MSMT_LEADCHNL_RV_PACING_THRESHOLD_AMPLITUDE: 0.9 V
MDC_IDC_MSMT_LEADCHNL_RV_PACING_THRESHOLD_PULSEWIDTH: 0.4 MS
MDC_IDC_PG_IMPLANT_DTM: NORMAL
MDC_IDC_PG_MFG: NORMAL
MDC_IDC_PG_MODEL: NORMAL
MDC_IDC_PG_SERIAL: NORMAL
MDC_IDC_PG_TYPE: NORMAL
MDC_IDC_SESS_CLINIC_NAME: NORMAL
MDC_IDC_SESS_DTM: NORMAL
MDC_IDC_SESS_TYPE: NORMAL
MDC_IDC_SET_BRADY_LOWRATE: 40 {BEATS}/MIN
MDC_IDC_SET_BRADY_MODE: NORMAL
MDC_IDC_SET_LEADCHNL_RV_PACING_AMPLITUDE: 2 V
MDC_IDC_SET_LEADCHNL_RV_PACING_POLARITY: NORMAL
MDC_IDC_SET_LEADCHNL_RV_PACING_PULSEWIDTH: 0.4 MS
MDC_IDC_SET_LEADCHNL_RV_SENSING_ADAPTATION_MODE: NORMAL
MDC_IDC_SET_LEADCHNL_RV_SENSING_POLARITY: NORMAL
MDC_IDC_SET_LEADCHNL_RV_SENSING_SENSITIVITY: 0.6 MV
MDC_IDC_SET_ZONE_DETECTION_INTERVAL: 300 MS
MDC_IDC_SET_ZONE_DETECTION_INTERVAL: 353 MS
MDC_IDC_SET_ZONE_TYPE: NORMAL
MDC_IDC_SET_ZONE_TYPE: NORMAL
MDC_IDC_SET_ZONE_VENDOR_TYPE: NORMAL
MDC_IDC_SET_ZONE_VENDOR_TYPE: NORMAL
MDC_IDC_STAT_BRADY_DTM_END: NORMAL
MDC_IDC_STAT_BRADY_DTM_START: NORMAL
MDC_IDC_STAT_BRADY_RV_PERCENT_PACED: 0 %
MDC_IDC_STAT_EPISODE_RECENT_COUNT: 0
MDC_IDC_STAT_EPISODE_RECENT_COUNT: 11
MDC_IDC_STAT_EPISODE_RECENT_COUNT: 2
MDC_IDC_STAT_EPISODE_RECENT_COUNT_DTM_END: NORMAL
MDC_IDC_STAT_EPISODE_RECENT_COUNT_DTM_START: NORMAL
MDC_IDC_STAT_EPISODE_TYPE: NORMAL
MDC_IDC_STAT_EPISODE_VENDOR_TYPE: NORMAL
MDC_IDC_STAT_TACHYTHERAPY_ATP_DELIVERED_RECENT: 0
MDC_IDC_STAT_TACHYTHERAPY_ATP_DELIVERED_TOTAL: 0
MDC_IDC_STAT_TACHYTHERAPY_RECENT_DTM_END: NORMAL
MDC_IDC_STAT_TACHYTHERAPY_RECENT_DTM_START: NORMAL
MDC_IDC_STAT_TACHYTHERAPY_SHOCKS_ABORTED_RECENT: 0
MDC_IDC_STAT_TACHYTHERAPY_SHOCKS_ABORTED_TOTAL: 0
MDC_IDC_STAT_TACHYTHERAPY_SHOCKS_DELIVERED_RECENT: 0
MDC_IDC_STAT_TACHYTHERAPY_SHOCKS_DELIVERED_TOTAL: 0
MDC_IDC_STAT_TACHYTHERAPY_TOTAL_DTM_END: NORMAL
MDC_IDC_STAT_TACHYTHERAPY_TOTAL_DTM_START: NORMAL

## 2022-07-12 DIAGNOSIS — I50.22 CHRONIC SYSTOLIC HEART FAILURE (H): ICD-10-CM

## 2022-07-12 DIAGNOSIS — R06.09 DOE (DYSPNEA ON EXERTION): ICD-10-CM

## 2022-07-12 DIAGNOSIS — I50.23 ACUTE ON CHRONIC SYSTOLIC CONGESTIVE HEART FAILURE (H): ICD-10-CM

## 2022-07-12 DIAGNOSIS — I42.2 HYPERTROPHIC NONOBSTRUCTIVE CARDIOMYOPATHY (H): ICD-10-CM

## 2022-07-12 RX ORDER — CARVEDILOL 12.5 MG/1
12.5 TABLET ORAL 2 TIMES DAILY WITH MEALS
Qty: 180 TABLET | Refills: 1 | Status: ON HOLD | OUTPATIENT
Start: 2022-07-12 | End: 2023-04-09

## 2022-07-12 RX ORDER — LISINOPRIL 5 MG/1
5 TABLET ORAL 2 TIMES DAILY
Qty: 180 TABLET | Refills: 0 | Status: ON HOLD | OUTPATIENT
Start: 2022-07-12 | End: 2023-04-09

## 2022-07-19 DIAGNOSIS — N52.9 ERECTILE DYSFUNCTION, UNSPECIFIED ERECTILE DYSFUNCTION TYPE: ICD-10-CM

## 2022-07-25 RX ORDER — SILDENAFIL 50 MG/1
TABLET, FILM COATED ORAL
Qty: 20 TABLET | Refills: 0 | Status: ON HOLD | OUTPATIENT
Start: 2022-07-25 | End: 2023-04-09

## 2022-08-21 ENCOUNTER — ANCILLARY PROCEDURE (OUTPATIENT)
Dept: CARDIOLOGY | Facility: CLINIC | Age: 57
End: 2022-08-21
Attending: INTERNAL MEDICINE
Payer: COMMERCIAL

## 2022-08-21 DIAGNOSIS — I50.22 CHRONIC SYSTOLIC HEART FAILURE (H): ICD-10-CM

## 2022-08-21 DIAGNOSIS — I42.9 CARDIOMYOPATHY (H): ICD-10-CM

## 2022-08-21 DIAGNOSIS — Z95.810 ICD (IMPLANTABLE CARDIOVERTER-DEFIBRILLATOR) IN PLACE: ICD-10-CM

## 2022-08-21 PROCEDURE — 93295 DEV INTERROG REMOTE 1/2/MLT: CPT | Performed by: INTERNAL MEDICINE

## 2022-08-21 PROCEDURE — 93296 REM INTERROG EVL PM/IDS: CPT

## 2022-09-13 LAB
MDC_IDC_EPISODE_DTM: NORMAL
MDC_IDC_EPISODE_ID: NORMAL
MDC_IDC_EPISODE_TYPE: NORMAL
MDC_IDC_LEAD_IMPLANT_DT: NORMAL
MDC_IDC_LEAD_LOCATION: NORMAL
MDC_IDC_LEAD_LOCATION_DETAIL_1: NORMAL
MDC_IDC_LEAD_MFG: NORMAL
MDC_IDC_LEAD_MODEL: NORMAL
MDC_IDC_LEAD_POLARITY_TYPE: NORMAL
MDC_IDC_LEAD_SERIAL: NORMAL
MDC_IDC_MSMT_BATTERY_DTM: NORMAL
MDC_IDC_MSMT_BATTERY_REMAINING_LONGEVITY: 138 MO
MDC_IDC_MSMT_BATTERY_REMAINING_PERCENTAGE: 100 %
MDC_IDC_MSMT_BATTERY_STATUS: NORMAL
MDC_IDC_MSMT_CAP_CHARGE_DTM: NORMAL
MDC_IDC_MSMT_CAP_CHARGE_TIME: 10.3 S
MDC_IDC_MSMT_CAP_CHARGE_TYPE: NORMAL
MDC_IDC_MSMT_LEADCHNL_RV_IMPEDANCE_VALUE: 442 OHM
MDC_IDC_PG_IMPLANT_DTM: NORMAL
MDC_IDC_PG_MFG: NORMAL
MDC_IDC_PG_MODEL: NORMAL
MDC_IDC_PG_SERIAL: NORMAL
MDC_IDC_PG_TYPE: NORMAL
MDC_IDC_SESS_CLINIC_NAME: NORMAL
MDC_IDC_SESS_DTM: NORMAL
MDC_IDC_SESS_TYPE: NORMAL
MDC_IDC_SET_BRADY_LOWRATE: 40 {BEATS}/MIN
MDC_IDC_SET_BRADY_MODE: NORMAL
MDC_IDC_SET_LEADCHNL_RV_PACING_AMPLITUDE: 2 V
MDC_IDC_SET_LEADCHNL_RV_PACING_POLARITY: NORMAL
MDC_IDC_SET_LEADCHNL_RV_PACING_PULSEWIDTH: 0.4 MS
MDC_IDC_SET_LEADCHNL_RV_SENSING_ADAPTATION_MODE: NORMAL
MDC_IDC_SET_LEADCHNL_RV_SENSING_POLARITY: NORMAL
MDC_IDC_SET_LEADCHNL_RV_SENSING_SENSITIVITY: 0.6 MV
MDC_IDC_SET_ZONE_DETECTION_INTERVAL: 300 MS
MDC_IDC_SET_ZONE_DETECTION_INTERVAL: 353 MS
MDC_IDC_SET_ZONE_TYPE: NORMAL
MDC_IDC_SET_ZONE_TYPE: NORMAL
MDC_IDC_SET_ZONE_VENDOR_TYPE: NORMAL
MDC_IDC_SET_ZONE_VENDOR_TYPE: NORMAL
MDC_IDC_STAT_BRADY_DTM_END: NORMAL
MDC_IDC_STAT_BRADY_DTM_START: NORMAL
MDC_IDC_STAT_BRADY_RV_PERCENT_PACED: 0 %
MDC_IDC_STAT_EPISODE_RECENT_COUNT: 0
MDC_IDC_STAT_EPISODE_RECENT_COUNT: 12
MDC_IDC_STAT_EPISODE_RECENT_COUNT: 2
MDC_IDC_STAT_EPISODE_RECENT_COUNT_DTM_END: NORMAL
MDC_IDC_STAT_EPISODE_RECENT_COUNT_DTM_START: NORMAL
MDC_IDC_STAT_EPISODE_TYPE: NORMAL
MDC_IDC_STAT_EPISODE_VENDOR_TYPE: NORMAL
MDC_IDC_STAT_TACHYTHERAPY_ATP_DELIVERED_RECENT: 0
MDC_IDC_STAT_TACHYTHERAPY_ATP_DELIVERED_TOTAL: 0
MDC_IDC_STAT_TACHYTHERAPY_RECENT_DTM_END: NORMAL
MDC_IDC_STAT_TACHYTHERAPY_RECENT_DTM_START: NORMAL
MDC_IDC_STAT_TACHYTHERAPY_SHOCKS_ABORTED_RECENT: 0
MDC_IDC_STAT_TACHYTHERAPY_SHOCKS_ABORTED_TOTAL: 0
MDC_IDC_STAT_TACHYTHERAPY_SHOCKS_DELIVERED_RECENT: 0
MDC_IDC_STAT_TACHYTHERAPY_SHOCKS_DELIVERED_TOTAL: 0
MDC_IDC_STAT_TACHYTHERAPY_TOTAL_DTM_END: NORMAL
MDC_IDC_STAT_TACHYTHERAPY_TOTAL_DTM_START: NORMAL

## 2022-10-15 ENCOUNTER — HEALTH MAINTENANCE LETTER (OUTPATIENT)
Age: 57
End: 2022-10-15

## 2022-10-28 NOTE — TELEPHONE ENCOUNTER
Have patient do CBC, CMP, hemoglobin A1c.   Reason for Call:  Other call back    Detailed comments: Casandra states pt would like to discuss information about the pt that was given to provider that was not correct. He does not have a daughter etc, said she would like to speak to provider directly only.     Phone Number Patient can be reached at: Other phone number:  [phone]    Best Time: asap    Can we leave a detailed message on this number? YES    Call taken on 11/20/2019 at 9:42 AM by Syl Bobby

## 2022-11-17 NOTE — TELEPHONE ENCOUNTER
9/28/2017    Call Regarding Preventive Health Screening Colonoscopy    Attempt 1    Message on voicemail     Comments:       Outreach   CC     57 year old female with peripheral T cell lymphoma s/p CHOEP x 6 admitted for autologous pbsct with high dose CBV prep regimen.     Problem/Plan - 1:  ·  Problem: Stem cells transplant status.   ·  Plan: 1. Admit to BMTU   2. TLC placement in IR   3. Day -9 - day -2 - antiemetics   4. Day -9 & day -8, VP16 2400mg/m2 IV x 34 hours (final concentration 0.4mg /mL).   5. Strict I&O, daily weights, prn diuresis   6. After completion of VP16- start CTX hydration (D51/2NS + 10mEq KCl @ 150ml / m2) - continue for 24 hours post infusion of CTX   7. Day -7 - day -4 - CTX 1800 mg / m2 daily over 2 hours. Follow SMA7, mag, phos 6hours post CT . Mesna  12mg / kg - hemorrhagic cystitis ppx   8. Day -3 - BCNU 400mg / m2   9. Day -2 - day -1 - rest days  10. Day 0 - HPC transplant. Start Zarxio 480mcg SQ QD, continue through engraftment. Kepivance on days 0, 1 & 2   11. Anxiolytics, antinausea, antidiarrhea medications as needed   12. Lasix PRN while being aggressively hydrated to avoid VOD   13. Nutritional support, pain management.    Problem/Plan - 2:  ·  Problem: Need for prophylactic measure.   ·  Plan: 1. VOD prophylaxis - low dose heparin gtt (dosed at 100 units / kg / day), glutamine supplementation, Actigall BID   2. PCP prophylaxis - Bactrim DS through day -2    3. Antiviral prophylaxis - Acyclovir 400mg po TID to start day -1   4. Antifungal prophylaxis- Diflucan 400 mg po daily.  5. GI prophylaxis - Protonix po QD   6. Antibacterial prophylaxis - when ANC < 500, start Cipro 500mg po BID. If becomes febrile, pan cx, CXR and change Cipro to Cefepime 2g IV q 8 hours. Continue until count recovery  7. Kepivance for prevention of mucositis- days 0, +1, +2  8. Aggressive mouth care and skin care as per protocol. 57 year old female with peripheral T cell lymphoma s/p CHOEP x 6 admitted for autologous pbsct with high dose CBV prep regimen.     Problem/Plan -   1:  Problem: Stem cell transplant   ·  Plan: 1. Admit to BMTU   2. TLC placement in IR   3. Day -9 - day -2 - antiemetics   4. Day -9 & day -8, VP16 2400mg/m2 IV x 34 hours (final concentration 0.4mg /mL).   5. Strict I&O, daily weights, prn diuresis   6. After completion of VP16- start CTX hydration (D51/2NS + 10mEq KCl @ 150ml / m2) - continue for 24 hours post infusion of CTX   7. Day -7 - day -4 - CTX 1800 mg / m2 daily over 2 hours. Follow SMA7, mag, phos 6hours post CT . Mesna  12mg / kg - hemorrhagic cystitis ppx   8. Day -3 - BCNU 400mg / m2   9. Day -2 - day -1 - rest days  10. Day 0 - HPC transplant. Start Zarxio 480mcg SQ QD, continue through engraftment. Kepivance on days 0, 1 & 2   11. Anxiolytics, antinausea, antidiarrhea medications as needed   12. Lasix PRN while being aggressively hydrated to avoid VOD   13. Nutritional support, pain management.    Problem/Plan - 2:  ·  Problem: Need for prophylactic measure.   ·  Plan: 1. VOD prophylaxis - low dose heparin gtt (dosed at 100 units / kg / day), glutamine supplementation, Actigall BID   2. PCP prophylaxis - Bactrim DS through day -2    3. Antiviral prophylaxis - Acyclovir 400mg po TID to start day -1   4. Antifungal prophylaxis- Diflucan 400 mg po daily.  5. GI prophylaxis - Protonix po QD   6. Antibacterial prophylaxis - when ANC < 500, start Cipro 500mg po BID. If becomes febrile, pan cx, CXR and change Cipro to Cefepime 2g IV q 8 hours. Continue until count recovery  7. Kepivance for prevention of mucositis- days 0, +1, +2  8. Aggressive mouth care and skin care as per protocol.  9. transfusion and electrolyte support

## 2022-12-06 ENCOUNTER — ANCILLARY PROCEDURE (OUTPATIENT)
Dept: CARDIOLOGY | Facility: CLINIC | Age: 57
End: 2022-12-06
Attending: INTERNAL MEDICINE
Payer: COMMERCIAL

## 2022-12-06 DIAGNOSIS — I50.22 CHRONIC SYSTOLIC HEART FAILURE (H): ICD-10-CM

## 2022-12-06 DIAGNOSIS — Z95.810 ICD (IMPLANTABLE CARDIOVERTER-DEFIBRILLATOR) IN PLACE: ICD-10-CM

## 2022-12-06 DIAGNOSIS — I42.9 CARDIOMYOPATHY (H): ICD-10-CM

## 2022-12-06 PROCEDURE — 93295 DEV INTERROG REMOTE 1/2/MLT: CPT | Performed by: INTERNAL MEDICINE

## 2022-12-06 PROCEDURE — 93296 REM INTERROG EVL PM/IDS: CPT

## 2022-12-19 LAB
MDC_IDC_EPISODE_DTM: NORMAL
MDC_IDC_EPISODE_ID: NORMAL
MDC_IDC_EPISODE_TYPE: NORMAL
MDC_IDC_LEAD_IMPLANT_DT: NORMAL
MDC_IDC_LEAD_LOCATION: NORMAL
MDC_IDC_LEAD_LOCATION_DETAIL_1: NORMAL
MDC_IDC_LEAD_MFG: NORMAL
MDC_IDC_LEAD_MODEL: NORMAL
MDC_IDC_LEAD_POLARITY_TYPE: NORMAL
MDC_IDC_LEAD_SERIAL: NORMAL
MDC_IDC_MSMT_BATTERY_DTM: NORMAL
MDC_IDC_MSMT_BATTERY_REMAINING_LONGEVITY: 132 MO
MDC_IDC_MSMT_BATTERY_REMAINING_PERCENTAGE: 100 %
MDC_IDC_MSMT_BATTERY_STATUS: NORMAL
MDC_IDC_MSMT_CAP_CHARGE_DTM: NORMAL
MDC_IDC_MSMT_CAP_CHARGE_TIME: 10.4 S
MDC_IDC_MSMT_CAP_CHARGE_TYPE: NORMAL
MDC_IDC_MSMT_LEADCHNL_RV_IMPEDANCE_VALUE: 403 OHM
MDC_IDC_MSMT_LEADCHNL_RV_PACING_THRESHOLD_AMPLITUDE: 0.9 V
MDC_IDC_MSMT_LEADCHNL_RV_PACING_THRESHOLD_PULSEWIDTH: 0.4 MS
MDC_IDC_PG_IMPLANT_DTM: NORMAL
MDC_IDC_PG_MFG: NORMAL
MDC_IDC_PG_MODEL: NORMAL
MDC_IDC_PG_SERIAL: NORMAL
MDC_IDC_PG_TYPE: NORMAL
MDC_IDC_SESS_CLINIC_NAME: NORMAL
MDC_IDC_SESS_DTM: NORMAL
MDC_IDC_SESS_TYPE: NORMAL
MDC_IDC_SET_BRADY_LOWRATE: 40 {BEATS}/MIN
MDC_IDC_SET_BRADY_MODE: NORMAL
MDC_IDC_SET_LEADCHNL_RV_PACING_AMPLITUDE: 2 V
MDC_IDC_SET_LEADCHNL_RV_PACING_POLARITY: NORMAL
MDC_IDC_SET_LEADCHNL_RV_PACING_PULSEWIDTH: 0.4 MS
MDC_IDC_SET_LEADCHNL_RV_SENSING_ADAPTATION_MODE: NORMAL
MDC_IDC_SET_LEADCHNL_RV_SENSING_POLARITY: NORMAL
MDC_IDC_SET_LEADCHNL_RV_SENSING_SENSITIVITY: 0.6 MV
MDC_IDC_SET_ZONE_DETECTION_INTERVAL: 300 MS
MDC_IDC_SET_ZONE_DETECTION_INTERVAL: 353 MS
MDC_IDC_SET_ZONE_TYPE: NORMAL
MDC_IDC_SET_ZONE_TYPE: NORMAL
MDC_IDC_SET_ZONE_VENDOR_TYPE: NORMAL
MDC_IDC_SET_ZONE_VENDOR_TYPE: NORMAL
MDC_IDC_STAT_BRADY_DTM_END: NORMAL
MDC_IDC_STAT_BRADY_DTM_START: NORMAL
MDC_IDC_STAT_BRADY_RV_PERCENT_PACED: 0 %
MDC_IDC_STAT_EPISODE_RECENT_COUNT: 0
MDC_IDC_STAT_EPISODE_RECENT_COUNT: 12
MDC_IDC_STAT_EPISODE_RECENT_COUNT_DTM_END: NORMAL
MDC_IDC_STAT_EPISODE_RECENT_COUNT_DTM_START: NORMAL
MDC_IDC_STAT_EPISODE_TYPE: NORMAL
MDC_IDC_STAT_EPISODE_VENDOR_TYPE: NORMAL
MDC_IDC_STAT_TACHYTHERAPY_ATP_DELIVERED_RECENT: 0
MDC_IDC_STAT_TACHYTHERAPY_ATP_DELIVERED_TOTAL: 0
MDC_IDC_STAT_TACHYTHERAPY_RECENT_DTM_END: NORMAL
MDC_IDC_STAT_TACHYTHERAPY_RECENT_DTM_START: NORMAL
MDC_IDC_STAT_TACHYTHERAPY_SHOCKS_ABORTED_RECENT: 0
MDC_IDC_STAT_TACHYTHERAPY_SHOCKS_ABORTED_TOTAL: 0
MDC_IDC_STAT_TACHYTHERAPY_SHOCKS_DELIVERED_RECENT: 0
MDC_IDC_STAT_TACHYTHERAPY_SHOCKS_DELIVERED_TOTAL: 0
MDC_IDC_STAT_TACHYTHERAPY_TOTAL_DTM_END: NORMAL
MDC_IDC_STAT_TACHYTHERAPY_TOTAL_DTM_START: NORMAL

## 2023-03-15 ENCOUNTER — ANCILLARY PROCEDURE (OUTPATIENT)
Dept: CARDIOLOGY | Facility: CLINIC | Age: 58
DRG: 001 | End: 2023-03-15
Attending: STUDENT IN AN ORGANIZED HEALTH CARE EDUCATION/TRAINING PROGRAM
Payer: COMMERCIAL

## 2023-03-15 ENCOUNTER — APPOINTMENT (OUTPATIENT)
Dept: GENERAL RADIOLOGY | Facility: CLINIC | Age: 58
DRG: 001 | End: 2023-03-15
Attending: STUDENT IN AN ORGANIZED HEALTH CARE EDUCATION/TRAINING PROGRAM
Payer: COMMERCIAL

## 2023-03-15 ENCOUNTER — HOSPITAL ENCOUNTER (INPATIENT)
Facility: CLINIC | Age: 58
LOS: 25 days | Discharge: HOME | DRG: 001 | End: 2023-04-09
Attending: STUDENT IN AN ORGANIZED HEALTH CARE EDUCATION/TRAINING PROGRAM | Admitting: ANESTHESIOLOGY
Payer: COMMERCIAL

## 2023-03-15 DIAGNOSIS — J44.9 COPD, SEVERE (H): ICD-10-CM

## 2023-03-15 DIAGNOSIS — I47.20 VENTRICULAR TACHYCARDIA (H): ICD-10-CM

## 2023-03-15 DIAGNOSIS — I50.23 ACUTE ON CHRONIC SYSTOLIC CONGESTIVE HEART FAILURE (H): ICD-10-CM

## 2023-03-15 DIAGNOSIS — Z95.811 LVAD (LEFT VENTRICULAR ASSIST DEVICE) PRESENT (H): Primary | ICD-10-CM

## 2023-03-15 DIAGNOSIS — R55 SYNCOPE AND COLLAPSE: ICD-10-CM

## 2023-03-15 LAB
ALBUMIN SERPL BCG-MCNC: 3.8 G/DL (ref 3.5–5.2)
ALBUMIN UR-MCNC: 200 MG/DL
ALP SERPL-CCNC: 82 U/L (ref 40–129)
ALT SERPL W P-5'-P-CCNC: 24 U/L (ref 10–50)
ANION GAP SERPL CALCULATED.3IONS-SCNC: 12 MMOL/L (ref 7–15)
APPEARANCE UR: CLEAR
AST SERPL W P-5'-P-CCNC: 35 U/L (ref 10–50)
BASOPHILS # BLD AUTO: 0.1 10E3/UL (ref 0–0.2)
BASOPHILS NFR BLD AUTO: 1 %
BILIRUB SERPL-MCNC: 0.7 MG/DL
BILIRUB UR QL STRIP: NEGATIVE
BUN SERPL-MCNC: 27.6 MG/DL (ref 6–20)
CA-I BLD-MCNC: 4.7 MG/DL (ref 4.4–5.2)
CALCIUM SERPL-MCNC: 9.1 MG/DL (ref 8.6–10)
CHLORIDE SERPL-SCNC: 99 MMOL/L (ref 98–107)
COLOR UR AUTO: YELLOW
CPB POCT: NO
CREAT SERPL-MCNC: 1.29 MG/DL (ref 0.67–1.17)
D DIMER PPP FEU-MCNC: 1.54 UG/ML FEU (ref 0–0.5)
DEPRECATED HCO3 PLAS-SCNC: 25 MMOL/L (ref 22–29)
EOSINOPHIL # BLD AUTO: 0.5 10E3/UL (ref 0–0.7)
EOSINOPHIL NFR BLD AUTO: 4 %
ERYTHROCYTE [DISTWIDTH] IN BLOOD BY AUTOMATED COUNT: 14.4 % (ref 10–15)
GFR SERPL CREATININE-BSD FRML MDRD: 65 ML/MIN/1.73M2
GLUCOSE BLD-MCNC: 94 MG/DL (ref 70–99)
GLUCOSE BLDC GLUCOMTR-MCNC: 95 MG/DL (ref 70–99)
GLUCOSE SERPL-MCNC: 98 MG/DL (ref 70–99)
GLUCOSE UR STRIP-MCNC: NEGATIVE MG/DL
HCO3 BLDV-SCNC: 27 MMOL/L (ref 21–28)
HCT VFR BLD AUTO: 49 % (ref 40–53)
HCT VFR BLD CALC: 48 % (ref 40–53)
HGB BLD-MCNC: 15.3 G/DL (ref 13.3–17.7)
HGB BLD-MCNC: 16.3 G/DL (ref 13.3–17.7)
HGB UR QL STRIP: NEGATIVE
HOLD SPECIMEN: NORMAL
IMM GRANULOCYTES # BLD: 0.1 10E3/UL
IMM GRANULOCYTES NFR BLD: 1 %
INR PPP: 1.13 (ref 0.85–1.15)
KETONES UR STRIP-MCNC: NEGATIVE MG/DL
LACTATE SERPL-SCNC: 1.3 MMOL/L (ref 0.7–2)
LEUKOCYTE ESTERASE UR QL STRIP: NEGATIVE
LYMPHOCYTES # BLD AUTO: 3.8 10E3/UL (ref 0.8–5.3)
LYMPHOCYTES NFR BLD AUTO: 31 %
MCH RBC QN AUTO: 30.3 PG (ref 26.5–33)
MCHC RBC AUTO-ENTMCNC: 31.2 G/DL (ref 31.5–36.5)
MCV RBC AUTO: 97 FL (ref 78–100)
MONOCYTES # BLD AUTO: 1 10E3/UL (ref 0–1.3)
MONOCYTES NFR BLD AUTO: 8 %
MUCOUS THREADS #/AREA URNS LPF: PRESENT /LPF
NEUTROPHILS # BLD AUTO: 6.7 10E3/UL (ref 1.6–8.3)
NEUTROPHILS NFR BLD AUTO: 55 %
NITRATE UR QL: NEGATIVE
NRBC # BLD AUTO: 0 10E3/UL
NRBC BLD AUTO-RTO: 0 /100
PCO2 BLDV: 47 MM HG (ref 40–50)
PH BLDV: 7.37 [PH] (ref 7.32–7.43)
PH UR STRIP: 6 [PH] (ref 5–7)
PLATELET # BLD AUTO: 200 10E3/UL (ref 150–450)
PO2 BLDV: 29 MM HG (ref 25–47)
POTASSIUM BLD-SCNC: 4.2 MMOL/L (ref 3.4–5.3)
POTASSIUM SERPL-SCNC: 4.3 MMOL/L (ref 3.4–5.3)
PROT SERPL-MCNC: 6 G/DL (ref 6.4–8.3)
RBC # BLD AUTO: 5.05 10E6/UL (ref 4.4–5.9)
RBC URINE: 2 /HPF
SAO2 % BLDV: 53 % (ref 94–100)
SODIUM BLD-SCNC: 138 MMOL/L (ref 133–144)
SODIUM SERPL-SCNC: 136 MMOL/L (ref 136–145)
SP GR UR STRIP: 1.02 (ref 1–1.03)
TROPONIN T BLD-MCNC: 0.02 UG/L
TROPONIN T SERPL HS-MCNC: 31 NG/L
TROPONIN T SERPL HS-MCNC: 40 NG/L
UROBILINOGEN UR STRIP-MCNC: 2 MG/DL
WBC # BLD AUTO: 12.2 10E3/UL (ref 4–11)
WBC URINE: 6 /HPF

## 2023-03-15 PROCEDURE — 84484 ASSAY OF TROPONIN QUANT: CPT

## 2023-03-15 PROCEDURE — 99291 CRITICAL CARE FIRST HOUR: CPT | Mod: 25 | Performed by: STUDENT IN AN ORGANIZED HEALTH CARE EDUCATION/TRAINING PROGRAM

## 2023-03-15 PROCEDURE — 85004 AUTOMATED DIFF WBC COUNT: CPT | Performed by: STUDENT IN AN ORGANIZED HEALTH CARE EDUCATION/TRAINING PROGRAM

## 2023-03-15 PROCEDURE — 80053 COMPREHEN METABOLIC PANEL: CPT

## 2023-03-15 PROCEDURE — 250N000009 HC RX 250: Performed by: STUDENT IN AN ORGANIZED HEALTH CARE EDUCATION/TRAINING PROGRAM

## 2023-03-15 PROCEDURE — 84155 ASSAY OF PROTEIN SERUM: CPT | Performed by: STUDENT IN AN ORGANIZED HEALTH CARE EDUCATION/TRAINING PROGRAM

## 2023-03-15 PROCEDURE — 93010 ELECTROCARDIOGRAM REPORT: CPT | Mod: 59 | Performed by: STUDENT IN AN ORGANIZED HEALTH CARE EDUCATION/TRAINING PROGRAM

## 2023-03-15 PROCEDURE — 82962 GLUCOSE BLOOD TEST: CPT

## 2023-03-15 PROCEDURE — 83880 ASSAY OF NATRIURETIC PEPTIDE: CPT | Performed by: STUDENT IN AN ORGANIZED HEALTH CARE EDUCATION/TRAINING PROGRAM

## 2023-03-15 PROCEDURE — 120N000003 HC R&B IMCU UMMC

## 2023-03-15 PROCEDURE — 85610 PROTHROMBIN TIME: CPT | Performed by: STUDENT IN AN ORGANIZED HEALTH CARE EDUCATION/TRAINING PROGRAM

## 2023-03-15 PROCEDURE — 93296 REM INTERROG EVL PM/IDS: CPT

## 2023-03-15 PROCEDURE — 82947 ASSAY GLUCOSE BLOOD QUANT: CPT | Performed by: STUDENT IN AN ORGANIZED HEALTH CARE EDUCATION/TRAINING PROGRAM

## 2023-03-15 PROCEDURE — 93005 ELECTROCARDIOGRAM TRACING: CPT | Performed by: STUDENT IN AN ORGANIZED HEALTH CARE EDUCATION/TRAINING PROGRAM

## 2023-03-15 PROCEDURE — 36415 COLL VENOUS BLD VENIPUNCTURE: CPT | Performed by: STUDENT IN AN ORGANIZED HEALTH CARE EDUCATION/TRAINING PROGRAM

## 2023-03-15 PROCEDURE — 93308 TTE F-UP OR LMTD: CPT | Mod: 26 | Performed by: STUDENT IN AN ORGANIZED HEALTH CARE EDUCATION/TRAINING PROGRAM

## 2023-03-15 PROCEDURE — 83735 ASSAY OF MAGNESIUM: CPT | Performed by: STUDENT IN AN ORGANIZED HEALTH CARE EDUCATION/TRAINING PROGRAM

## 2023-03-15 PROCEDURE — 71045 X-RAY EXAM CHEST 1 VIEW: CPT | Mod: 26 | Performed by: RADIOLOGY

## 2023-03-15 PROCEDURE — 82803 BLOOD GASES ANY COMBINATION: CPT

## 2023-03-15 PROCEDURE — 93308 TTE F-UP OR LMTD: CPT | Performed by: STUDENT IN AN ORGANIZED HEALTH CARE EDUCATION/TRAINING PROGRAM

## 2023-03-15 PROCEDURE — 93010 ELECTROCARDIOGRAM REPORT: CPT | Performed by: INTERNAL MEDICINE

## 2023-03-15 PROCEDURE — 94640 AIRWAY INHALATION TREATMENT: CPT | Performed by: STUDENT IN AN ORGANIZED HEALTH CARE EDUCATION/TRAINING PROGRAM

## 2023-03-15 PROCEDURE — 71045 X-RAY EXAM CHEST 1 VIEW: CPT

## 2023-03-15 PROCEDURE — 93295 DEV INTERROG REMOTE 1/2/MLT: CPT | Performed by: INTERNAL MEDICINE

## 2023-03-15 PROCEDURE — 83605 ASSAY OF LACTIC ACID: CPT | Performed by: STUDENT IN AN ORGANIZED HEALTH CARE EDUCATION/TRAINING PROGRAM

## 2023-03-15 PROCEDURE — 84450 TRANSFERASE (AST) (SGOT): CPT | Performed by: STUDENT IN AN ORGANIZED HEALTH CARE EDUCATION/TRAINING PROGRAM

## 2023-03-15 PROCEDURE — 81001 URINALYSIS AUTO W/SCOPE: CPT | Performed by: STUDENT IN AN ORGANIZED HEALTH CARE EDUCATION/TRAINING PROGRAM

## 2023-03-15 PROCEDURE — 85379 FIBRIN DEGRADATION QUANT: CPT | Performed by: STUDENT IN AN ORGANIZED HEALTH CARE EDUCATION/TRAINING PROGRAM

## 2023-03-15 PROCEDURE — 82947 ASSAY GLUCOSE BLOOD QUANT: CPT

## 2023-03-15 PROCEDURE — 80162 ASSAY OF DIGOXIN TOTAL: CPT | Performed by: STUDENT IN AN ORGANIZED HEALTH CARE EDUCATION/TRAINING PROGRAM

## 2023-03-15 PROCEDURE — 84484 ASSAY OF TROPONIN QUANT: CPT | Performed by: STUDENT IN AN ORGANIZED HEALTH CARE EDUCATION/TRAINING PROGRAM

## 2023-03-15 RX ORDER — ASPIRIN 81 MG/1
81 TABLET ORAL DAILY
Status: DISCONTINUED | OUTPATIENT
Start: 2023-03-16 | End: 2023-03-18

## 2023-03-15 RX ORDER — LISINOPRIL 5 MG/1
5 TABLET ORAL 2 TIMES DAILY
Status: DISCONTINUED | OUTPATIENT
Start: 2023-03-15 | End: 2023-03-16

## 2023-03-15 RX ORDER — DIGOXIN 125 MCG
125 TABLET ORAL DAILY
Status: ON HOLD | COMMUNITY
Start: 2022-07-15 | End: 2023-04-09

## 2023-03-15 RX ORDER — CARVEDILOL 6.25 MG/1
12.5 TABLET ORAL 2 TIMES DAILY WITH MEALS
Status: DISCONTINUED | OUTPATIENT
Start: 2023-03-16 | End: 2023-03-18

## 2023-03-15 RX ORDER — IPRATROPIUM BROMIDE AND ALBUTEROL SULFATE 2.5; .5 MG/3ML; MG/3ML
3 SOLUTION RESPIRATORY (INHALATION) ONCE
Status: COMPLETED | OUTPATIENT
Start: 2023-03-15 | End: 2023-03-15

## 2023-03-15 RX ORDER — ALBUTEROL SULFATE 90 UG/1
1-2 AEROSOL, METERED RESPIRATORY (INHALATION) EVERY 4 HOURS PRN
Status: DISCONTINUED | OUTPATIENT
Start: 2023-03-15 | End: 2023-03-16

## 2023-03-15 RX ORDER — DIGOXIN 125 MCG
125 TABLET ORAL DAILY
Status: DISCONTINUED | OUTPATIENT
Start: 2023-03-16 | End: 2023-03-23

## 2023-03-15 RX ORDER — SPIRONOLACTONE 25 MG
12.5 TABLET ORAL DAILY
Status: DISCONTINUED | OUTPATIENT
Start: 2023-03-16 | End: 2023-03-18

## 2023-03-15 RX ADMIN — IPRATROPIUM BROMIDE AND ALBUTEROL SULFATE 3 ML: .5; 3 SOLUTION RESPIRATORY (INHALATION) at 21:37

## 2023-03-15 ASSESSMENT — ACTIVITIES OF DAILY LIVING (ADL)
ADLS_ACUITY_SCORE: 35
ADLS_ACUITY_SCORE: 35

## 2023-03-15 NOTE — Clinical Note
IABP inserted in the right femoral artery. IABP inserted with 50 cc balloon volume Lot number is: [id number]

## 2023-03-15 NOTE — LETTER
"  Mechanical Circulatory Support Program  [place] [address], [facility] [address]  [address] [place] [address]  [phone] Office Phone  [phone] Fax Number  Faxed to:  [facility]  Fax Number:  [phone]        [facility] [employer]  DOPPLER / BP CUFF ORDER FORM  Please submit this document along with patient's supply order. one doppler and one BP cuff per patient  Date:  03/30/23  Facility Name: [facility]    Patient: Akshat Fragoso  YOB: 1965    VAD Team Member:  Marine Gonzalez RN  Phone Number:  [phone]  Fax Number: [phone]    Check products desired:  ___X_        Arpit Sonotrax Vascular Doppler (w/ 8 MHz probe)  ___X_       Clear Ultrasound Gel  __X__        RE027B Blood Pressure Cuff w/ lifetime calibration  Please measure to ensure correct size as equipment cannot be returned.   ____ X   Adult circumference 9.8\" x 15.7\" (24.9 cm x 39.8 cm) overall length 20.9\" (53.9 cm)          ____    Pediatric Circumference 7.5\" X 9.8\" (19 cm x 24.9 cm) overall length  15\" (38 cm)          ____    Adult XL circumference 13.4\" X 19.7\" (34 cm x 50 cm) overall length 25.6\" (65 cm)      Signed,      Shaun Aguiar MD  Heart Failure, Mechanical Circulatory Support and Transplant Cardiology   of Medicine,  Division of Cardiology, [facility]  "

## 2023-03-15 NOTE — LETTER
Mechanical Circulatory Support Program  [place] [address], [facility] [address]  [address] [place] [address]  [phone] Office Phone  [phone] Fax Number          NOTIFICATION OF SPECIAL MEDICAL EQUIPMENT  To Whom it May Concern,    I attest to the existence of life sustaining equipment at the home of the below individual. I recommend that power never be interrupted from the below residence and that special attention be given to this customer in the event of disruption of power service. Lack of power could result in patient injury or death. If additional information or paperwork is needed, please contact the Ventricular Assist Device (VAD) office: phone [phone], fax [phone].     Name of Patient:   Akshat Fragoso    Address:   [address] [place] [id number]    Type of medical equipment:  Ventricular Assist Device  Length of time equipment will be necessary: Indefinitely   Is this equipment life supporting?   Yes   Physician Name:  Cosme Dunn MD     Thank you for your consideration in this important matter,    Dr. Cosme Dunn      For the purpose of the form and its possible verification, I hereby authorize release of medical information to [employer] or its representative.     Signature of patient: ______________________________________________Date:__________

## 2023-03-15 NOTE — LETTER
Faxed to:  [facility]  Fax Number:  [phone]                                                                                                                                                                                             [email or web address]   Email Order Form to [email or web address]  Phone: [phone] Fax: [phone]      Patient Name: Akshat Fragoso Date: 03/24/23   Facility Name: [facility]  Fax Number: [phone]    VAD Coordinator/ :   Miroslava Lobato RN Phone: [phone]    Email Address:  [email or web address]   Patient's Primary Insurance Upon Receiving the VAD unit: [employer]         Dressing Change Frequency: Daily X Every Other Day   Other (Specify)              Duration of Need:  DT (Lifetime) X BTT (until transplant)   Other (Specify)      NEW ORDERS ONLY: PLEASE SUBMIT PATIENT DEMOGRAPHICS, POST OP NOTES & NOTES FROM MOST RECENT CLINICAL VISIT WITH ORDER FORM.                  Fax: [phone]  Or Email Order Form to   [email or web address]     ck Product Size/ Description Quantity    Kit Options:     X Medline Driveline Management Tray [id number]    Daily Up to 30    Medline Driveline Management Tray [id number]  Sensitive Up to 30    Medline Driveline Management Tray [id number]  Weekly Up to 5    Centurion Oxford [place]  [id number] Up to 30   X Centurion Adkins [place] [id number] Up to 30    Aquaguard/Shower Shield 7 x 7       9  x 9      10  x 12  Up to 30    Blenderm Surgical Tape 2 inch     Sensitive Skin:     X Uni-Solve Adhesive Remover Wipes  1 box    FreeDerm Adhesive Remover 1 oz. Spray Bottle Up to 8    Betadine Swabs  3/pack Up to 30    Hibiclens 16 oz. Bottle Up to 3    MicroKlenz Wound Cleanser Bottle Up to 3    Medipore H Tape 2  Roll Up to 5    Micropore Paper Tape 2  Roll Up to 5    CE 3M Blue Silicone Tape  Up to 5    MC Todd Secure  [place]  Up to 8    CathGrip [place] Med/2 Strap    Large/2  Strap Up to 10    Abdominal Binder Sm     Med     Lg      Up to 2    Simpurity Transparent Film 4 x 5 Up to 30    Individual Supplies:      Earloop Surgical Mask 100/box Up to 3    Alcohol Wipe  1 Box    Non Sterile Gloves  100/box        Size: S    M    L   XL Up to 3    Chlorascrub Swabsticks   Each     1  Up to 30    Sterile Vinyl PF gloves Sizes   6   7   8   9 Up to 30    Sterile Gauze Sponge 4  x 4   (2/pkg) Up to 30    Sterile Drain Sponge 4  x 4   (2/pkg) Up to 30    Biopatch 1  Up to 30    3M No-sting Barrier Wipes 50/box Up to 3    Sorbaview Shield  Up to 30    Silverlon 1.5cm Square disc [id number] Up to 30     My signature below certifies the medical necessity of the above approved products and I certify the patient has been trained in the use of all products. The patient is aware that [facility] will contact him/her regarding the shipment of ordered dressing supplies.     Provider Name: Shaun Aguiar NPI#: [id number]   Provider Signature:  Provider Number: [id number]     [facility] will confirm receipt for all initial orders by sending a fax to the provider listed above.

## 2023-03-15 NOTE — Clinical Note
dry, intact, no bleeding and no hematoma. 7Fr sheath SWAN in RIJ. Sutured in place. Dressing over site.   9Fr IABP in RFA. Sutured and stat lock in place. Dressing over site.

## 2023-03-15 NOTE — Clinical Note
Secured by suture. Continue Regimen: triamcinolone acetonide 0.1 % topical cream BID\\nSig: Apply twice daily to psoriasis up to 2 weeks/month as needed. Mix with Cerave .\\n\\nhydroxyzine HCl 10 mg tablet \\nSig: Take 1-3 tablets by oral route pills nightly\\n\\nOtezla 30mg daily Plan: Will submit rx for Tremfya to pharmacy. Recheck in 1 month. Detail Level: Zone Render In Strict Bullet Format?: No

## 2023-03-15 NOTE — LETTER
Mechanical Circulatory Support Program  [place] [address], [facility] [address]  [address] [place] [address]  [phone] Office Phone  [phone] Fax Number      NOTIFICATION OF SPECIAL MEDICAL EQUIPMENT  To Whom it May Concern,    I attest to the existence of life sustaining equipment at the home of the below individual. I recommend that power never be interrupted from the below residence and that special attention be given to this customer in the event of disruption of power service. Lack of power could result in patient injury or death. If additional information or paperwork is needed, please contact the Ventricular Assist Device (VAD) office: phone [phone], fax [phone].     Name of Patient:   Akshat Fragoso    Address:   [address] [place] [id number]    Type of medical equipment:  Ventricular Assist Device  Length of time equipment will be necessary: Indefinitely   Is this equipment life supporting?   Yes   Physician Name:  Shaun Aguiar MD     Thank you for your consideration in this important matter,    Dr. Shaun Aguiar      For the purpose of the form and its possible verification, I hereby authorize release of medical information to [employer] or its representative.     Signature of patient: ______________________________________________Date:__________

## 2023-03-16 ENCOUNTER — APPOINTMENT (OUTPATIENT)
Dept: CT IMAGING | Facility: CLINIC | Age: 58
DRG: 001 | End: 2023-03-16
Attending: STUDENT IN AN ORGANIZED HEALTH CARE EDUCATION/TRAINING PROGRAM
Payer: COMMERCIAL

## 2023-03-16 ENCOUNTER — APPOINTMENT (OUTPATIENT)
Dept: GENERAL RADIOLOGY | Facility: CLINIC | Age: 58
DRG: 001 | End: 2023-03-16
Attending: STUDENT IN AN ORGANIZED HEALTH CARE EDUCATION/TRAINING PROGRAM
Payer: COMMERCIAL

## 2023-03-16 ENCOUNTER — APPOINTMENT (OUTPATIENT)
Dept: OCCUPATIONAL THERAPY | Facility: CLINIC | Age: 58
DRG: 001 | End: 2023-03-16
Attending: STUDENT IN AN ORGANIZED HEALTH CARE EDUCATION/TRAINING PROGRAM
Payer: COMMERCIAL

## 2023-03-16 ENCOUNTER — APPOINTMENT (OUTPATIENT)
Dept: CARDIOLOGY | Facility: CLINIC | Age: 58
DRG: 001 | End: 2023-03-16
Attending: STUDENT IN AN ORGANIZED HEALTH CARE EDUCATION/TRAINING PROGRAM
Payer: COMMERCIAL

## 2023-03-16 LAB
ALBUMIN SERPL BCG-MCNC: 3.6 G/DL (ref 3.5–5.2)
ALBUMIN SERPL BCG-MCNC: 3.9 G/DL (ref 3.5–5.2)
ALP SERPL-CCNC: 73 U/L (ref 40–129)
ALP SERPL-CCNC: 78 U/L (ref 40–129)
ALT SERPL W P-5'-P-CCNC: 22 U/L (ref 10–50)
ALT SERPL W P-5'-P-CCNC: 26 U/L (ref 10–50)
ANION GAP SERPL CALCULATED.3IONS-SCNC: 12 MMOL/L (ref 7–15)
ANION GAP SERPL CALCULATED.3IONS-SCNC: 13 MMOL/L (ref 7–15)
ANION GAP SERPL CALCULATED.3IONS-SCNC: 13 MMOL/L (ref 7–15)
AST SERPL W P-5'-P-CCNC: 34 U/L (ref 10–50)
AST SERPL W P-5'-P-CCNC: 34 U/L (ref 10–50)
ATRIAL RATE - MUSE: 86 BPM
BASE EXCESS BLDV CALC-SCNC: -0.4 MMOL/L (ref -7.7–1.9)
BASE EXCESS BLDV CALC-SCNC: 4.2 MMOL/L (ref -7.7–1.9)
BILIRUB SERPL-MCNC: 0.7 MG/DL
BILIRUB SERPL-MCNC: 1.1 MG/DL
BUN SERPL-MCNC: 26.5 MG/DL (ref 6–20)
BUN SERPL-MCNC: 29.4 MG/DL (ref 6–20)
BUN SERPL-MCNC: 33.3 MG/DL (ref 6–20)
CALCIUM SERPL-MCNC: 8.7 MG/DL (ref 8.6–10)
CALCIUM SERPL-MCNC: 8.8 MG/DL (ref 8.6–10)
CALCIUM SERPL-MCNC: 9.1 MG/DL (ref 8.6–10)
CHLORIDE SERPL-SCNC: 102 MMOL/L (ref 98–107)
CHLORIDE SERPL-SCNC: 97 MMOL/L (ref 98–107)
CHLORIDE SERPL-SCNC: 99 MMOL/L (ref 98–107)
CREAT SERPL-MCNC: 1.18 MG/DL (ref 0.67–1.17)
CREAT SERPL-MCNC: 1.21 MG/DL (ref 0.67–1.17)
CREAT SERPL-MCNC: 1.23 MG/DL (ref 0.67–1.17)
CREAT SERPL-MCNC: 1.68 MG/DL (ref 0.67–1.17)
DEPRECATED HCO3 PLAS-SCNC: 21 MMOL/L (ref 22–29)
DEPRECATED HCO3 PLAS-SCNC: 23 MMOL/L (ref 22–29)
DEPRECATED HCO3 PLAS-SCNC: 26 MMOL/L (ref 22–29)
DIASTOLIC BLOOD PRESSURE - MUSE: NORMAL MMHG
DIGOXIN SERPL-MCNC: 0.9 NG/ML (ref 0.6–2)
GFR SERPL CREATININE-BSD FRML MDRD: 48 ML/MIN/1.73M2
GFR SERPL CREATININE-BSD FRML MDRD: 69 ML/MIN/1.73M2
GFR SERPL CREATININE-BSD FRML MDRD: 71 ML/MIN/1.73M2
GFR SERPL CREATININE-BSD FRML MDRD: 73 ML/MIN/1.73M2
GLUCOSE SERPL-MCNC: 113 MG/DL (ref 70–99)
GLUCOSE SERPL-MCNC: 121 MG/DL (ref 70–99)
GLUCOSE SERPL-MCNC: 163 MG/DL (ref 70–99)
HCO3 BLDV-SCNC: 28 MMOL/L (ref 21–28)
HCO3 BLDV-SCNC: 32 MMOL/L (ref 21–28)
HOLD SPECIMEN: NORMAL
HOLD SPECIMEN: NORMAL
INTERPRETATION ECG - MUSE: NORMAL
LVEF ECHO: NORMAL
MAGNESIUM SERPL-MCNC: 1.6 MG/DL (ref 1.7–2.3)
MAGNESIUM SERPL-MCNC: 1.9 MG/DL (ref 1.7–2.3)
MAGNESIUM SERPL-MCNC: 2 MG/DL (ref 1.7–2.3)
MAGNESIUM SERPL-MCNC: 2.1 MG/DL (ref 1.7–2.3)
NT-PROBNP SERPL-MCNC: ABNORMAL PG/ML (ref 0–900)
NT-PROBNP SERPL-MCNC: ABNORMAL PG/ML (ref 0–900)
O2/TOTAL GAS SETTING VFR VENT: 28 %
O2/TOTAL GAS SETTING VFR VENT: 28 %
OXYHGB MFR BLDV: 52 % (ref 70–75)
OXYHGB MFR BLDV: 57 % (ref 70–75)
P AXIS - MUSE: 57 DEGREES
PCO2 BLDV: 58 MM HG (ref 40–50)
PCO2 BLDV: 58 MM HG (ref 40–50)
PH BLDV: 7.29 [PH] (ref 7.32–7.43)
PH BLDV: 7.35 [PH] (ref 7.32–7.43)
PO2 BLDV: 30 MM HG (ref 25–47)
PO2 BLDV: 34 MM HG (ref 25–47)
POTASSIUM SERPL-SCNC: 4.5 MMOL/L (ref 3.4–5.3)
POTASSIUM SERPL-SCNC: 4.5 MMOL/L (ref 3.4–5.3)
POTASSIUM SERPL-SCNC: 4.9 MMOL/L (ref 3.4–5.3)
PR INTERVAL - MUSE: 180 MS
PROT SERPL-MCNC: 5.7 G/DL (ref 6.4–8.3)
PROT SERPL-MCNC: 6 G/DL (ref 6.4–8.3)
QRS DURATION - MUSE: 156 MS
QT - MUSE: 414 MS
QTC - MUSE: 495 MS
R AXIS - MUSE: -46 DEGREES
SODIUM SERPL-SCNC: 133 MMOL/L (ref 136–145)
SODIUM SERPL-SCNC: 135 MMOL/L (ref 136–145)
SODIUM SERPL-SCNC: 138 MMOL/L (ref 136–145)
SYSTOLIC BLOOD PRESSURE - MUSE: NORMAL MMHG
T AXIS - MUSE: 108 DEGREES
VENTRICULAR RATE- MUSE: 86 BPM

## 2023-03-16 PROCEDURE — 93010 ELECTROCARDIOGRAM REPORT: CPT | Performed by: INTERNAL MEDICINE

## 2023-03-16 PROCEDURE — 258N000003 HC RX IP 258 OP 636: Performed by: STUDENT IN AN ORGANIZED HEALTH CARE EDUCATION/TRAINING PROGRAM

## 2023-03-16 PROCEDURE — 999N000208 ECHOCARDIOGRAM COMPLETE

## 2023-03-16 PROCEDURE — 02HQ32Z INSERTION OF MONITORING DEVICE INTO RIGHT PULMONARY ARTERY, PERCUTANEOUS APPROACH: ICD-10-PCS | Performed by: STUDENT IN AN ORGANIZED HEALTH CARE EDUCATION/TRAINING PROGRAM

## 2023-03-16 PROCEDURE — 250N000011 HC RX IP 250 OP 636: Performed by: INTERNAL MEDICINE

## 2023-03-16 PROCEDURE — 71045 X-RAY EXAM CHEST 1 VIEW: CPT | Mod: 26 | Performed by: RADIOLOGY

## 2023-03-16 PROCEDURE — 70486 CT MAXILLOFACIAL W/O DYE: CPT | Mod: 26 | Performed by: RADIOLOGY

## 2023-03-16 PROCEDURE — 250N000009 HC RX 250: Performed by: STUDENT IN AN ORGANIZED HEALTH CARE EDUCATION/TRAINING PROGRAM

## 2023-03-16 PROCEDURE — 97165 OT EVAL LOW COMPLEX 30 MIN: CPT | Mod: GO

## 2023-03-16 PROCEDURE — 36415 COLL VENOUS BLD VENIPUNCTURE: CPT | Performed by: STUDENT IN AN ORGANIZED HEALTH CARE EDUCATION/TRAINING PROGRAM

## 2023-03-16 PROCEDURE — 250N000011 HC RX IP 250 OP 636

## 2023-03-16 PROCEDURE — 83735 ASSAY OF MAGNESIUM: CPT | Performed by: INTERNAL MEDICINE

## 2023-03-16 PROCEDURE — 250N000011 HC RX IP 250 OP 636: Performed by: STUDENT IN AN ORGANIZED HEALTH CARE EDUCATION/TRAINING PROGRAM

## 2023-03-16 PROCEDURE — 80053 COMPREHEN METABOLIC PANEL: CPT | Performed by: STUDENT IN AN ORGANIZED HEALTH CARE EDUCATION/TRAINING PROGRAM

## 2023-03-16 PROCEDURE — 200N000002 HC R&B ICU UMMC

## 2023-03-16 PROCEDURE — 999N000065 XR CHEST PORT 1 VIEW

## 2023-03-16 PROCEDURE — 83735 ASSAY OF MAGNESIUM: CPT | Performed by: STUDENT IN AN ORGANIZED HEALTH CARE EDUCATION/TRAINING PROGRAM

## 2023-03-16 PROCEDURE — 93503 INSERT/PLACE HEART CATHETER: CPT | Mod: GC | Performed by: INTERNAL MEDICINE

## 2023-03-16 PROCEDURE — 97110 THERAPEUTIC EXERCISES: CPT | Mod: GO

## 2023-03-16 PROCEDURE — 94640 AIRWAY INHALATION TREATMENT: CPT

## 2023-03-16 PROCEDURE — C8924 2D TTE W OR W/O FOL W/CON,FU: HCPCS

## 2023-03-16 PROCEDURE — 82805 BLOOD GASES W/O2 SATURATION: CPT | Performed by: STUDENT IN AN ORGANIZED HEALTH CARE EDUCATION/TRAINING PROGRAM

## 2023-03-16 PROCEDURE — 250N000013 HC RX MED GY IP 250 OP 250 PS 637: Performed by: STUDENT IN AN ORGANIZED HEALTH CARE EDUCATION/TRAINING PROGRAM

## 2023-03-16 PROCEDURE — 70486 CT MAXILLOFACIAL W/O DYE: CPT

## 2023-03-16 PROCEDURE — 94640 AIRWAY INHALATION TREATMENT: CPT | Mod: 76

## 2023-03-16 PROCEDURE — 255N000002 HC RX 255 OP 636: Performed by: INTERNAL MEDICINE

## 2023-03-16 PROCEDURE — 97535 SELF CARE MNGMENT TRAINING: CPT | Mod: GO

## 2023-03-16 PROCEDURE — 93306 TTE W/DOPPLER COMPLETE: CPT | Mod: 26 | Performed by: INTERNAL MEDICINE

## 2023-03-16 PROCEDURE — 250N000013 HC RX MED GY IP 250 OP 250 PS 637

## 2023-03-16 PROCEDURE — 250N000009 HC RX 250

## 2023-03-16 PROCEDURE — 999N000045 HC STATISTIC DAILY SWAN MONITORING

## 2023-03-16 PROCEDURE — 999N000208 ECHOCARDIOGRAM LIMITED

## 2023-03-16 PROCEDURE — 999N000155 HC STATISTIC RAPCV CVP MONITORING

## 2023-03-16 PROCEDURE — 93503 INSERT/PLACE HEART CATHETER: CPT

## 2023-03-16 PROCEDURE — 999N000157 HC STATISTIC RCP TIME EA 10 MIN

## 2023-03-16 PROCEDURE — 82565 ASSAY OF CREATININE: CPT | Performed by: INTERNAL MEDICINE

## 2023-03-16 PROCEDURE — 82565 ASSAY OF CREATININE: CPT | Performed by: STUDENT IN AN ORGANIZED HEALTH CARE EDUCATION/TRAINING PROGRAM

## 2023-03-16 PROCEDURE — 83880 ASSAY OF NATRIURETIC PEPTIDE: CPT

## 2023-03-16 RX ORDER — FUROSEMIDE 10 MG/ML
40 INJECTION INTRAMUSCULAR; INTRAVENOUS ONCE
Status: COMPLETED | OUTPATIENT
Start: 2023-03-16 | End: 2023-03-16

## 2023-03-16 RX ORDER — MAGNESIUM SULFATE HEPTAHYDRATE 40 MG/ML
2 INJECTION, SOLUTION INTRAVENOUS ONCE
Status: COMPLETED | OUTPATIENT
Start: 2023-03-16 | End: 2023-03-17

## 2023-03-16 RX ORDER — NALOXONE HYDROCHLORIDE 0.4 MG/ML
0.4 INJECTION, SOLUTION INTRAMUSCULAR; INTRAVENOUS; SUBCUTANEOUS
Status: DISCONTINUED | OUTPATIENT
Start: 2023-03-16 | End: 2023-03-23

## 2023-03-16 RX ORDER — OXYCODONE HYDROCHLORIDE 5 MG/1
5 TABLET ORAL EVERY 4 HOURS PRN
Status: COMPLETED | OUTPATIENT
Start: 2023-03-16 | End: 2023-03-16

## 2023-03-16 RX ORDER — ALBUTEROL SULFATE 0.83 MG/ML
2.5 SOLUTION RESPIRATORY (INHALATION)
Status: DISCONTINUED | OUTPATIENT
Start: 2023-03-16 | End: 2023-03-16

## 2023-03-16 RX ORDER — NALOXONE HYDROCHLORIDE 0.4 MG/ML
0.2 INJECTION, SOLUTION INTRAMUSCULAR; INTRAVENOUS; SUBCUTANEOUS
Status: DISCONTINUED | OUTPATIENT
Start: 2023-03-16 | End: 2023-03-23

## 2023-03-16 RX ORDER — LEVALBUTEROL INHALATION SOLUTION 1.25 MG/3ML
1.25 SOLUTION RESPIRATORY (INHALATION)
Status: DISCONTINUED | OUTPATIENT
Start: 2023-03-16 | End: 2023-03-23

## 2023-03-16 RX ORDER — LISINOPRIL 5 MG/1
5 TABLET ORAL 2 TIMES DAILY
Status: DISCONTINUED | OUTPATIENT
Start: 2023-03-16 | End: 2023-03-18

## 2023-03-16 RX ORDER — OXYCODONE HYDROCHLORIDE 5 MG/1
5 TABLET ORAL EVERY 6 HOURS PRN
Status: DISCONTINUED | OUTPATIENT
Start: 2023-03-16 | End: 2023-03-17

## 2023-03-16 RX ORDER — ACETAMINOPHEN 325 MG/1
975 TABLET ORAL EVERY 6 HOURS PRN
Status: DISCONTINUED | OUTPATIENT
Start: 2023-03-16 | End: 2023-03-23

## 2023-03-16 RX ORDER — ENOXAPARIN SODIUM 100 MG/ML
40 INJECTION SUBCUTANEOUS EVERY 24 HOURS
Status: DISCONTINUED | OUTPATIENT
Start: 2023-03-16 | End: 2023-03-18

## 2023-03-16 RX ORDER — LIDOCAINE HYDROCHLORIDE 10 MG/ML
INJECTION, SOLUTION EPIDURAL; INFILTRATION; INTRACAUDAL; PERINEURAL
Status: COMPLETED
Start: 2023-03-16 | End: 2023-03-16

## 2023-03-16 RX ORDER — ACETAMINOPHEN 325 MG/1
650 TABLET ORAL EVERY 6 HOURS PRN
Status: DISCONTINUED | OUTPATIENT
Start: 2023-03-16 | End: 2023-03-16

## 2023-03-16 RX ADMIN — LEVALBUTEROL HYDROCHLORIDE 1.25 MG: 1.25 SOLUTION RESPIRATORY (INHALATION) at 04:10

## 2023-03-16 RX ADMIN — OXYCODONE HYDROCHLORIDE 5 MG: 5 TABLET ORAL at 23:52

## 2023-03-16 RX ADMIN — FUROSEMIDE 40 MG: 10 INJECTION, SOLUTION INTRAVENOUS at 08:00

## 2023-03-16 RX ADMIN — DIGOXIN 125 MCG: 125 TABLET ORAL at 08:00

## 2023-03-16 RX ADMIN — OXYCODONE HYDROCHLORIDE 5 MG: 5 TABLET ORAL at 17:29

## 2023-03-16 RX ADMIN — OXYCODONE HYDROCHLORIDE 5 MG: 5 TABLET ORAL at 08:00

## 2023-03-16 RX ADMIN — FUROSEMIDE 40 MG: 10 INJECTION, SOLUTION INTRAVENOUS at 16:35

## 2023-03-16 RX ADMIN — ACETAMINOPHEN 650 MG: 325 TABLET ORAL at 12:01

## 2023-03-16 RX ADMIN — LISINOPRIL 5 MG: 2.5 TABLET ORAL at 08:00

## 2023-03-16 RX ADMIN — AMIODARONE HYDROCHLORIDE 1 MG/MIN: 50 INJECTION, SOLUTION INTRAVENOUS at 00:51

## 2023-03-16 RX ADMIN — MAGNESIUM SULFATE IN WATER 2 G: 40 INJECTION, SOLUTION INTRAVENOUS at 23:52

## 2023-03-16 RX ADMIN — OXYCODONE HYDROCHLORIDE 5 MG: 5 TABLET ORAL at 03:59

## 2023-03-16 RX ADMIN — LEVALBUTEROL HYDROCHLORIDE 1.25 MG: 1.25 SOLUTION RESPIRATORY (INHALATION) at 13:06

## 2023-03-16 RX ADMIN — ASPIRIN 81 MG: 81 TABLET ORAL at 08:00

## 2023-03-16 RX ADMIN — HUMAN ALBUMIN MICROSPHERES AND PERFLUTREN 6 ML: 10; .22 INJECTION, SOLUTION INTRAVENOUS at 10:38

## 2023-03-16 RX ADMIN — AMIODARONE HYDROCHLORIDE 75 MG: 1.5 INJECTION, SOLUTION INTRAVENOUS at 00:14

## 2023-03-16 RX ADMIN — LIDOCAINE HYDROCHLORIDE 300 MG: 10 INJECTION, SOLUTION EPIDURAL; INFILTRATION; INTRACAUDAL; PERINEURAL at 16:35

## 2023-03-16 RX ADMIN — ACETAMINOPHEN 650 MG: 325 TABLET ORAL at 01:26

## 2023-03-16 RX ADMIN — ACETAMINOPHEN 975 MG: 325 TABLET ORAL at 17:29

## 2023-03-16 RX ADMIN — OXYCODONE HYDROCHLORIDE 5 MG: 5 TABLET ORAL at 12:01

## 2023-03-16 RX ADMIN — FUROSEMIDE 20 MG/HR: 10 INJECTION, SOLUTION INTRAVENOUS at 18:25

## 2023-03-16 RX ADMIN — CARVEDILOL 12.5 MG: 12.5 TABLET, FILM COATED ORAL at 08:00

## 2023-03-16 RX ADMIN — SPIRONOLACTONE 12.5 MG: 25 TABLET, FILM COATED ORAL at 08:00

## 2023-03-16 ASSESSMENT — ACTIVITIES OF DAILY LIVING (ADL)
ADLS_ACUITY_SCORE: 18
DIFFICULTY_EATING/SWALLOWING: NO
ADLS_ACUITY_SCORE: 35
ADLS_ACUITY_SCORE: 18
DRESSING/BATHING_DIFFICULTY: NO
ADLS_ACUITY_SCORE: 18
WALKING_OR_CLIMBING_STAIRS_DIFFICULTY: YES
TRANSFERRING: 0-->INDEPENDENT
ADLS_ACUITY_SCORE: 18
FALL_HISTORY_WITHIN_LAST_SIX_MONTHS: NO
WALKING_OR_CLIMBING_STAIRS: AMBULATION DIFFICULTY, ASSISTANCE 1 PERSON
CONCENTRATING,_REMEMBERING_OR_MAKING_DECISIONS_DIFFICULTY: NO
ADLS_ACUITY_SCORE: 35
TRANSFERRING: 0-->INDEPENDENT
CHANGE_IN_FUNCTIONAL_STATUS_SINCE_ONSET_OF_CURRENT_ILLNESS/INJURY: NO
ADLS_ACUITY_SCORE: 18
WEAR_GLASSES_OR_BLIND: NO
ADLS_ACUITY_SCORE: 18
TOILETING_ISSUES: NO
ADLS_ACUITY_SCORE: 18
ADLS_ACUITY_SCORE: 18
DOING_ERRANDS_INDEPENDENTLY_DIFFICULTY: NO

## 2023-03-16 NOTE — PROGRESS NOTES
03/16/23 0900   Appointment Info   Signing Clinician's Name / Credentials (OT) Mile Byers OTR/L   Living Environment   People in Home spouse   Current Living Arrangements house   Home Accessibility stairs to enter home   Number of Stairs, Main Entrance 8   Stair Railings, Main Entrance railing on left side (ascending)   Transportation Anticipated family or friend will provide   Living Environment Comments Pt lives in a one level home with tub-shower and no DME. Pt's son in home often and wife WFM can assist as needed   Self-Care   Usual Activity Tolerance good   Current Activity Tolerance moderate   Regular Exercise No   Equipment Currently Used at Home none   Fall history within last six months no   Activity/Exercise/Self-Care Comment Pt reports PLOF as IND with cares and reports no concerns for d/c at current level.   General Information   Onset of Illness/Injury or Date of Surgery 03/15/23   Referring Physician Phil Hoffman MD   Patient/Family Therapy Goal Statement (OT) Return home   Additional Occupational Profile Info/Pertinent History of Current Problem Akshat Fragoso is a 57 year old male with a previous medical history significant for HFrEF (8-10%), nonischemic cardiomyopathy ([employer] ICD in place - 2017), COPD, stage 3 CKD, and nicotine dependence presenting to the ED via EMS for syncopal episode.  Per EMS, patient had gone into cardiac arrest and was receiving CPR by patient's son when they arrived.  Patient's blood pressure had been 80/60 upon EMS arrival.  Patient does have a defibrillator and a pacemaker placed, however the pacemaker was not activated.   Left Upper Extremity (Weight-bearing Status) full weight-bearing (FWB)   Right Upper Extremity (Weight-bearing Status) full weight-bearing (FWB)   Left Lower Extremity (Weight-bearing Status) full weight-bearing (FWB)   Right Lower Extremity (Weight-bearing Status) full weight-bearing (FWB)   Cognitive Status Examination    Orientation Status orientation to person, place and time   Affect/Mental Status (Cognitive) WNL   Follows Commands WNL   Cognitive Status Comments No concens   Visual Perception   Visual Impairment/Limitations WNL   Sensory   Sensory Quick Adds sensation intact   Pain Assessment   Patient Currently in Pain Yes, see Vital Sign flowsheet   Posture   Posture not impaired   Range of Motion Comprehensive   General Range of Motion no range of motion deficits identified   Strength Comprehensive (MMT)   General Manual Muscle Testing (MMT) Assessment no strength deficits identified   Coordination   Upper Extremity Coordination No deficits were identified   Bed Mobility   Bed Mobility No deficits identified   Transfers   Transfers No deficits identified   Balance   Balance Assessment no deficits were identified   Bathing Assessment/Intervention   [place] Level (Bathing) supervision   Comment, (Bathing) Per clinical judgement   Lower Body Dressing Assessment/Training   [place] Level (Lower Body Dressing) supervision;verbal cues   Comment, (Lower Body Dressing) Per clinical judgement   Toileting   [place] Level (Toileting) supervision;verbal cues   Comment, (Toileting) Per clinical judgement   Clinical Impression   Criteria for Skilled Therapeutic Interventions Met (OT) Yes, treatment indicated   OT Diagnosis Decreased ADL function   Influenced by the following impairments Pain, deconditioning   OT Problem List-Impairments impacting ADL problems related to;activity tolerance impaired;pain;strength   Assessment of Occupational Performance 1-3 Performance Deficits   Planned Therapy Interventions (OT) ADL retraining;strengthening;home program guidelines;progressive activity/exercise   Clinical Decision Making Complexity (OT) low complexity   Risk & Benefits of therapy have been explained evaluation/treatment results reviewed;care plan/treatment goals reviewed;risks/benefits reviewed;current/potential barriers  reviewed;participants voiced agreement with care plan;participants included   Clinical Impression Comments Pt below baseline and will benefit from skilled OT throughout hospital stay to promote return to functional baseline.   OT Total Evaluation Time   OT Eval, Low Complexity Minutes (77624) 10   OT Goals   Therapy Frequency (OT) 3 times/wk   OT Predicted Duration/Target Date for Goal Attainment 03/23/23   OT Goals Lower Body Dressing;Lower Body Bathing;Toilet Transfer/Toileting   OT: Lower Body Dressing Independent   OT: Lower Body Bathing Modified independent   OT: Toilet Transfer/Toileting Independent   OT Discharge Planning   OT Plan Tb dressing, mobility, toileting   OT Discharge Recommendation (DC Rec) home with assist   OT Rationale for DC Rec Pt below baseline due to pain and weakness. Anticipate pt progress back to baseline function within this hospital stay and being say to d/c home when medically ready.   OT Brief overview of current status SBA

## 2023-03-16 NOTE — PROGRESS NOTES
Transfer  Transferred to: 4A  Via:bed  Reason for transfer:Pt no longer appropriate for 6B- requiring higher level of care for LVAD placement  Family: Aware of transfer  Belongings: Packed and sent with pt  Chart: Delivered with pt to next unit  Medications: Meds sent to new unit with pt  Report given to: Feroz HDZ  Pt status: Blood pressures low at this time, MD aware. Otherwise VSS. Endorses rib/chest pain from CPR. Gave PRN oxycodone and tylenol. Good UOP after IV lasix.

## 2023-03-16 NOTE — PROGRESS NOTES
[facility]    Cardiology Progress Note- Cardiology      Date of Admission:  3/15/2023     Assessment & Plan: SL   Akshat Fragoso is a 57 year old man with past medical history of HFrEF (EF 10% 5/2022) 2/2 NICM s/p ICD, tobacco use disorder, marijuana use, HTN, lumbar radiculopathy, CKD who presents after being found down at home. Found to have had a prolonged episode of VT with concern for cardiac arrest s/p ROSC in the field.    Today:  -Transfered to ICU for hemodynamic diuresis  -Holding anti-hypertensive today   -BP not tolerating amiodarone   -SG Cath placed today by Dr Santoyo.     # Ventricular tachycardia, out of hospital cardiac arrest  # Acute on Chronic Systolic Heart failure (EF 10% 5/2022) 2/2 NICM  # NYHA IIIb, AHA/ACC Class C  Presented with an episode of VT with poor cerebral perfusion and questionable consciousness/pulses and thus labeling as cardiac arrest. Time down ~8 min presumably based on timing when son responded. Trigger likely end stage cardiomyopathy with additional provoking factor of significantly reduced sleep. Volume status mildly hypervolemic though weights largely stable. Admission weight 170 lb. Dry weight 165-170 lb. Potassium, magnesium, calcium within normal limits, lactic acid 1.3 and troponin 31->40 with low probability of new ischemic event. Low probability of new PE. Device revealed 5 min 43 seconds of VT at 199 bpm, with spontaneous conversion to sinus rhythm without defibrillation. Device was set to monitor at VT threshold of 170 without ATP therapy to be given. No current evidence of cardiogenic shock picture while back in sinus rhythm, lactic acid normal, Cr baseline, LFTs normal.     Patient undergoing workup at [facility] for LVAD as destination therapy (initially evaluated for transplant though patient having trouble quitting marijuana/tobacco use). 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 Started workup  with initial teeth extraction for caries though has much of LVAD workup to complete. Prior seen at the [facility] for his heart failure though transferred his care to [place].    He continued to have runs of VT while here, with hypotension. He was transferred to the ICU on 3/16/23 for hemodynamic diuresis and further evaluation for advanced heart failure therapies. Echo in the am showed dilated left ventricle with hypokinesis and increased pressures. BNP elevated to 12k and renal function holding.      Recent studies:  - RHC RA 12, RV 52/6, PA 54/28/37, PCWP 24. CI 1.85 with PVR 3.6 LING. After nipride wedge well to 11, mPAP 22, PVR to 1.8 LING, CI to 3.1  - Coronary angiogram 2/2017 minimal nonobstructive coronary disease  - Echocardiogram 5/2022 EF 10%, LVIDd 7.9 cm, normal RV systolic function, TAPSE 2.4 cm  - CPX: Peak VO2: 13.3 ml/kg/min 3/8/21, with RER of 1.00     - VT secondary prevention while inpatient: amiodarone bolus 75 mg over 15 minutes with gtt at 1 mg/min. Held as it was not tolerated with hypotension.   - Hold PTA digoxin 125 mcg daily  - Volume status: hypervolemic, total of 60mg lasix since admission.   - BB: Hold PTA carvedilol 12.5 mg BID  - ACEi/ARB/ARNI: hold PTA lisinopril 5 mg BID  - MRA: hold PTA spironolactone 12.5 mg BID  - SGLT2i: none PTA  - SCD ppx/BiV pacer: ICD, reprogrammed device to provide ATP burst at VT threshold  - Continue PTA aspirin 81 mg daily  - Fluid restrict, daily I/O, daily weights, lytes checks     # CKD  Likely in setting of cardiorenal pathology over time. Cr baseline   - Monitor     # Documented history of atrial fibrillation  Currently in sinus, unable to see in notes from [facility] where this was found. Not on anticoagulation  - Telemetry here  - More records needed     # Lumbosacral radiculopathy  - Acetaminophen prn   - Takes norco at home, if severe pain here can use low dose oxycodone     # Tobacco use disorder  Has cut back it appears to a few cigarettes daily, about  "40 pack year history     # Marijuana use  Uses for anxiety    Diet: Fluid restriction 2000 ML FLUID  2 Gram Sodium Diet    DVT Prophylaxis: Pneumatic Compression Devices  Adkins Catheter: Not present  Cardiac Monitoring: ACTIVE order. Indication: Tachyarrhythmias, acute (48 hours)  Code Status: Full Code          Clinically Significant Risk Factors Present on Admission                  # Hypertension: home medication list includes antihypertensive(s)      # Overweight: Estimated body mass index is 27.04 kg/m  as calculated from the following:    Height as of this encounter: 1.702 m (5' 7\").    Weight as of this encounter: 78.3 kg (172 lb 9.9 oz).           Disposition Plan   Expected discharge:4-7 days pending stabilization and evaluation of advanced cardiac therapies.     Entered: German Velez Reyes, MD 03/16/2023, 7:24 AM   The patient's care was discussed with the Attending Physician, Dr. Hilton.      German Velez Reyes, MD, PhD  Internal Medicine PGY1  [facility]  ______________________________________________________________________    Interval History   Admitted overnight. Says that he is no feeling well this am, mostly feels tired and chest pain after CPR yesterday when he was found. He says that he has had evaluation at [facility] for LVAD evaluation, but he was last seen on April 2022. Denies palpitations today.     Physical Exam   Vital Signs: Temp: 97.8  F (36.6  C) Temp src: Oral BP: 92/65 Pulse: 75   Resp: 18 SpO2: 93 % O2 Device: Nasal cannula    Weight: 172 lbs 9.92 oz    GEN: NAD, pleasant  HEENT: no icterus  CV: RRR, JVP ~6-8, trace bilateral LE edema  CHEST: CTAB  ABD: soft, NT/ND, NABS  NEURO: AA&Ox3, fluent/appropriate, motor grossly nonfocal  PSYCH: cooperative, affect appropriate    Medical Decision Making       Please see A&P for additional details of medical decision making.      Data     I have personally reviewed the following data over the " past 24 hrs:    12.2 (H)  \   16.3   / 200     133 (L) 99 29.4 (H) /  113 (H)   4.9 21 (L) 1.23 (H) \       ALT: 22 AST: 34 AP: 73 TBILI: 0.7   ALB: 3.6 TOT PROTEIN: 5.7 (L) LIPASE: N/A       Trop: 40 (H) BNP: 13,200 (H)       Procal: N/A CRP: N/A Lactic Acid: 1.3       INR:  1.13 PTT:  N/A   D-dimer:  1.54 (H) Fibrinogen:  N/A       Imaging results reviewed over the past 24 hrs:   Recent Results (from the past 24 hour(s))   POC US ECHO LIMITED    Impression    Limited Bedside Cardiac Ultrasound, performed and interpreted by me.   Indication: SYncope vs cardiac arrest.  Parasternal long axis, parasternal short axis, apical 4 chamber and subcostal views were acquired.   Image quality was satisfactory.    Findings:    Abnormal: Severely reduced LVEF, grossly dilated ventricles.  No visualized pericardial effusion.  No visualized right heart strain.  Bilateral sliding signs without predominance of B-lines.    IMPRESSION: Abnormal limited cardiac ultrasound showing severely reduced LVEF, grossly dilated ventricles.  No pericardial effusion or visualized evidence of tamponade or right heart strain.  Bilateral sliding signs without evidence of pneumothorax post CPR.  No predominance of B-lines suggesting no severe pulmonary edema.  No pericardial effusion.     XR Chest Port 1 View    Narrative    EXAM:  XR CHEST PORT 1 VIEW    INDICATION: Chest pain, received CPR, hx CHF    COMPARISON:  Chest x-ray 12/4/2018    FINDINGS:  Single AP view of the chest. Left upper chest cardiac device with  single lead ventricular lead.    Enlarged cardiac silhouette. Diffuse reticular opacities. Prominent  central vascular. No pneumothorax.  No pleural effusion.  Unremarkable  upper abdomen. No acute bony lesions.      Impression    IMPRESSION:  Cardiomegaly with diffuse reticular opacities, likely pulmonary edema.    I have personally reviewed the examination and initial interpretation  and I agree with the findings.    NICOL FAJARDO MD          SYSTEM ID:  [id number]   Cardiac Device Check - Remote   Result Value    Date Time Interrogation Session [id number]    Implantable Pulse Generator  [employer]    Implantable Pulse Generator Model D150 DYNAGEN    Implantable Pulse Generator Serial Number [id number]    Type Interrogation Session Remote    Clinic Name [facility]    Implantable Pulse Generator Type Defibrillator    Implantable Pulse Generator Implant Date 20170608    Implantable Lead  [employer]    Implantable Lead Model 0293 [place] 4-Site SG    Implantable Lead Serial Number [id number]    Implantable Lead Implant Date 20170608    Implantable Lead Polarity Type Bipolar Lead    Implantable Lead Location Detail 1 UNKNOWN    Implantable Lead Location Right Ventricle    Jesus Setting Mode (NBG Code) VVI    Jesus Setting Lower Rate Limit 40    Lead Channel Setting Sensing Polarity Bipolar    Lead Channel Setting Sensing Sensitivity 0.6    Lead Channel Setting Sensing Adaptation Mode Adaptive    Lead Channel Setting Pacing Polarity Bipolar    Lead Channel Setting Pacing Pulse Width 0.4    Lead Channel Setting Pacing Amplitude 2.0    Zone Setting Type Category VF    Zone Setting Vendor Type Category VF    Zone Setting Detection Interval 300    Zone Setting Type Category VT    Zone Setting Vendor Type Category VT    Zone Setting Detection Interval 353    Lead Channel Impedance Value 416    Lead Channel Pacing Threshold Amplitude 0.9    Lead Channel Pacing Threshold Pulse Width 0.4    Battery Date Time of Measurements 20230315201900    Battery Status Beginning of Service    Battery Remaining Longevity 132    Battery Remaining Percentage 100    Capacitor Charge Type Reformation    Capacitor Last Charge Date Time 20221112043400    Capacitor Charge Time 10.4    Jesus Statistic Date Time Start 20210128000000    Jesus Statistic Date Time End 20230315000000    Jesus Statistic RV Percent Paced 0     Therapy Statistic Recent Shocks Delivered 0    Therapy Statistic Recent Shocks Aborted 0    Therapy Statistic Recent ATP Delivered 0    Therapy Statistic Recent Date Time Start 41411130293229    Therapy Statistic Recent Date Time End 20051480851447    Therapy Statistic Total Shocks Delivered 0    Therapy Statistic Total Shocks Aborted 0    Therapy Statistic Total ATP Delivered 0    Therapy Statistic Total  Date Time Start 89323793163822    Therapy Statistic Total  Date Time End 84672114753085    Episode Statistic Recent Count 13    Episode Statistic Type Category Other    Episode Statistic Recent Count 1    Episode Statistic Type Category VT    Episode Statistic Vendor Type Category NSVT    Episode Statistic Recent Count 0    Episode Statistic Type Category VF    Episode Statistic Vendor Type Category VF    Episode Statistic Recent Count 0    Episode Statistic Type Category VT    Episode Statistic Vendor Type Category VT    Episode Statistic Recent Count 0    Episode Statistic Type Category VT    Episode Statistic Vendor Type Category VT-1    Episode Statistic Recent Count 1    Episode Statistic Type Category VT    Episode Statistic Recent Date Time Start 29105215216962    Episode Statistic Recent Date Time End 32004655921574    Episode Statistic Recent Date Time Start 56591806365897    Episode Statistic Recent Date Time End 54632684067636    Episode Statistic Recent Date Time Start 21643909221475    Episode Statistic Recent Date Time End 63376594798886    Episode Statistic Recent Date Time Start 45562785566695    Episode Statistic Recent Date Time End 86525670053169    Episode Statistic Recent Date Time Start 79955591689994    Episode Statistic Recent Date Time End 06138859648314    Episode Statistic Recent Date Time Start 23499599832213    Episode Statistic Recent Date Time End 82333012648919    Episode Identifier V-47    Episode Type Category VT    Episode Vendor Type Category VT    Episode Date Time  [id number]    Episode Duration 343    Episode Identifier V-46    Episode Type Category VT    Episode Vendor Type Category VT    Episode Date Time [id number]    Episode Duration 15    Narrative    [employer] Lat[employer] Consult remote ICD transmission received from ER and reviewed. Device transmission sent per MD orders.     Device: [employer] D150 DYNAGEN  Normal Device Function  Mode: VVI 40 bpm  : 0%  Presenting EGM: VS @ 81 bpm  Lead Trends Appear Stable: yes  Estimated battery longevity to RRT = 11 years  Ventricular Arrhythmia: 1 episode recorded in the VT monitor zone on 3/15/23 @ 1803 - 199 bpm, 5 min 43 sec.  1 episode recorded on 1/22/23 @ 1818 - 15 sec, 180 bpm.  ER RN Notified of Transmission Results: Yes    DEL Gao RN    Remote ICD Transmission    I have reviewed and interpreted the device interrogation, settings, programming and nurse's summary. The device is functioning within normal device parameters. I agree with the current findings, assessment and plan.
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
## 2023-03-16 NOTE — H&P
Cardiology H&P  Akshat Fragoso MRN: [id number]  Age: 57 year old, : 1965    Date of Admission:  3/15/2023  Date of Service: 23    ASSESSMENT/PLAN:   Akshat Fragoso is a 57 year old man with past medical history of HFrEF (EF 10% 2022) 2/2 NICM s/p ICD, tobacco use disorder, marijuana use, HTN, lumbar radiculopathy, CKD who presents after being found down at home. Found to have had a prolonged episode of VT with concern for cardiac arrest s/p ROSC in the field.    # Ventricular tachycardia, out of hospital cardiac arrest  # Acute on Chronic Systolic Heart failure (EF 10% 2022) 2/2 NICM  # NYHA IIIb, AHA/ACC Class C  Presented with an episode of VT with poor cerebral perfusion and questionable consciousness/pulses and thus labeling as cardiac arrest. Time down ~8 min presumably based on timing when son responded. Trigger likely end stage cardiomyopathy with additional provoking factor of significantly reduced sleep. Volume status mildly hypervolemic though weights largely stable. Admission weight 170 lb. Dry weight 165-170 lb. Potassium, magnesium, calcium within normal limits, lactic acid 1.3 and troponin 31->40 with low probability of new ischemic event. Low probability of new PE. Device revealed 5 min 43 seconds of VT at 199 bpm, with spontaneous conversion to sinus rhythm without defibrillation. Device was set to monitor at VT threshold of 170 without ATP therapy to be given. No current evidence of cardiogenic shock picture while back in sinus rhythm, lactic acid normal, Cr baseline, LFTs normal.    Patient undergoing workup at [facility] for [facility] as destination therapy (initially evaluated for transplant though patient having trouble quitting marijuana/tobacco use). 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 Started workup with initial teeth extraction for caries though has much of LVAD workup to complete. Prior seen at the [facility] for his heart failure  though transferred his care to [place] [facility].    Recent studies:  - RHC RA 12, RV 52/6, PA 54/28/37, PCWP 24. CI 1.85 with PVR 3.6 LING. After nipride wedge well to 11, mPAP 22, PVR to 1.8 LING, CI to 3.1  - Coronary angiogram 2/2017 minimal nonobstructive coronary disease  - Echocardiogram 5/2022 EF 10%, LVIDd 7.9 cm, normal RV systolic function, TAPSE 2.4 cm  - CPX: Peak VO2: 13.3 ml/kg/min 3/8/21, with RER of 1.00    - VT secondary prevention while inpatient: amiodarone bolus 75 mg over 15 minutes with gtt at 1 mg/min  - Continue PTA digoxin 125 mcg daily  - Volume status: mildly hypervolemic, start with lasix 20 mg IV in AM (at home on lasix 40 mg PO every other day)  - BB: PTA carvedilol 12.5 mg BID  - ACEi/ARB/ARNI: PTA lisinopril 5 mg BID  - MRA: PTA spironolactone 12.5 mg BID  - SGLT2i: none PTA  - SCD ppx/BiV pacer: ICD   - Continue PTA aspirin 81 mg daily  - Fluid restrict, daily I/O, daily weights, lytes checks  - Echocardiogram in AM  - Day team to determine whether to keep for advanced therapies expedited workup vs. Further monitoring with close outpatient follow up at [facility]  - Reprogrammed device to provide ATP burst at VT threshold    # CKD  Likely in setting of cardiorenal pathology over time. Cr baseline   - Monitor    # Documented history atrial fibrillation  Currently in sinus, unable to see in notes from [facility] where this was found. Not on anticoagulation  - Telemetry here  - More records needed    # Lumbosacral radiculopathy  - Acetaminophen prn   - Takes norco at home, if severe pain here can use low dose oxycodone    # Tobacco use disorder  Has cut back it appears to a few cigarettes daily, about 40 pack year history    # Marijuana use  Uses for anxiety    Code status: Full  FEN: Cardiac  DVT Prophylaxis: Enoxaparin      To be formally staffed in AM    Phil Hoffman MD  Internal Medicine, PGY-3  Pager: [phone]    Chief Complaint: found down    History of Present Illness   Akshat Fragoso is a 57 year old  man with past medical history of HFrEF (EF 10% 5/2022) 2/2 NICM s/p ICD, tobacco use disorder, marijuana use, HTN, lumbar radiculopathy, CKD who presents after being found down at home.    Patient describes going up to his room at his house today around 6:30 PM and last conversation he remembers is by phone at 7 PM.  His son found him lying on the bed outstretched and moaning his wife's name with what he describes as a diffusely blue appearance.  Patient does not remember this.  Due to concern for cardiac arrest his son turned him on his back and began chest compressions and proceeded to lower him to the floor and called EMS.  Cardiac device check in the emergency department revealed 5 minutes and 49 seconds of ventricular tachycardia 199 bpm.  He spontaneously converted back to sinus rhythm without shock or ATP administration (device set only to monitor at VT threshold).    Patient states that in the last 3 days his wife has been out of town and due to some late nights getting ready he has not been able to sleep well at night and has been staying up late up until 5 in the morning and sleeping in the early part of the day.  He states he has been feeling more fatigued from that, however he has not felt more short of breath than usual without any increase in his weights which have been between 165 and 170 pounds for years.  This morning he was at 170 pounds.  No recent fevers, chills, cough, chest pain, worsening lower extremity edema.  He sleeps with several pillows propped up on the bed as he is not able to sleep flat due to shortness of breath, and this is unchanged.  Has been eating his normal diet, no new loose stools.  Does not drink alcohol, smokes a few cigarettes daily and appears to have cut back.  Regular marijuana use for pain.  These are all unchanged, no new illicit drug use, or new medications as of recent.      PAST MEDICAL HISTORY:  Past Medical History:   Diagnosis Date     Acute right lumbar  radiculopathy 11/02/2015     Acute systolic heart failure (H) 01/10/2017     Cardiomyopathy (H) 01/10/2017     CKD (chronic kidney disease) stage 3, GFR 30-59 ml/min (H) 01/30/2020     JOHNSON (dyspnea on exertion)      ED (erectile dysfunction) 10/02/2019     Erectile dysfunction      ICD (implantable cardioverter-defibrillator) in place- [place], single chamber- NOT dependent 10/09/2017     Personal history of smoking 01/04/2017     Pulmonary nodules 01/17/2017    CT 11/2018:  Bilateral pulmonary nodules measuring up to 4 mm. No new or enlarging pulmonary nodules.       CURRENT MEDICATIONS:  Current Outpatient Medications   Medication Sig Dispense Refill     digoxin (LANOXIN) 125 MCG tablet Take 125 mcg by mouth       albuterol (PROAIR HFA/PROVENTIL HFA/VENTOLIN HFA) 108 (90 Base) MCG/ACT inhaler INHALE 1 TO 2 PUFFS INTO THE LUNGS EVERY 4 HOURS AS NEEDED FOR SHORTNESS OF BREATH OR DIFFICULT BREATHING OR WHEEZING 18 g 11     aspirin (ASA) 81 MG EC tablet Take 1 tablet by mouth daily       aspirin 81 MG tablet Take 81 mg by mouth daily       carvedilol (COREG) 12.5 MG tablet Take 1 tablet (12.5 mg) by mouth 2 times daily (with meals) Please make a follow up appointment for further refills. 180 tablet 1     furosemide (LASIX) 20 MG tablet Take 1 tablet (20 mg) by mouth 2 times daily (Only take if weight is up by 3 LBS) 180 tablet 1     HYDROcodone-acetaminophen (NORCO) 5-325 MG tablet Take 1 tablet by mouth every 6 hours as needed for pain 18 tablet 0     lisinopril (ZESTRIL) 5 MG tablet Take 1 tablet (5 mg) by mouth 2 times daily Please make a follow up appointment for further refills. 180 tablet 0     sildenafil (VIAGRA) 50 MG tablet TAKE 1 TABLET BY MOUTH ONCE DAILY AS NEEDED FOR ERECTILE DYSFUNCTION 20 tablet 0     spironolactone (ALDACTONE) 25 MG tablet Take 0.5 tablets (12.5 mg) by mouth daily 45 tablet 1       PAST SURGICAL HISTORY:  Past Surgical History:   Procedure Laterality Date     CARDIAC SURGERY   2016    defibrillator placement     CV RIGHT HEART CATH MEASUREMENTS RECORDED N/A 11/20/2019    Procedure: CV RIGHT HEART CATH;  Surgeon: Jeff Aguilar MD;  Location:  HEART CARDIAC CATH LAB     None         ALLERGIES     Allergies   Allergen Reactions     No Known Allergies        FAMILY HISTORY:  Family History   Problem Relation Age of Onset     Parkinsonism Mother      Atrial fibrillation Father      Heart Failure Father      Prostate Cancer Father      Skin Cancer Father      Anorexia nervosa Daughter      Other - See Comments Granddaughter         premature birth       SOCIAL HISTORY:  Social History     Socioeconomic History     Marital status:      Spouse name: Cheryl     Number of children: 2   Occupational History     Occupation: Construction      Employer: SELF   Tobacco Use     Smoking status: Light Smoker     Packs/day: 0.25     Years: 15.00     Pack years: 3.75     Types: Cigarettes     Smokeless tobacco: Former     Quit date: 10/24/2011   Vaping Use     Vaping Use: Never used   Substance and Sexual Activity     Alcohol use: No     Alcohol/week: 0.0 standard drinks     Drug use: No     Sexual activity: Yes     Partners: Female     Birth control/protection: Condom   Other Topics Concern     Parent/sibling w/ CABG, MI or angioplasty before 65F 55M? Yes     Comment: both parents had stints placed        Review of Systems   The 10 point Review of Systems is negative other than noted in the HPI or here.     Physical Exam   Temp: 97.1  F (36.2  C) Temp src: Axillary BP: (!) 87/61 Pulse: 75   Resp: (!) 51 SpO2: 99 % O2 Device: None (Room air)    Vital Signs with Ranges  Temp:  [97.1  F (36.2  C)] 97.1  F (36.2  C)  Pulse:  [73-85] 75  Resp:  [10-51] 51  BP: (81-96)/(59-72) 87/61  SpO2:  [93 %-100 %] 99 %  0 lbs 0 oz    GEN: NAD, pleasant  HEENT: no icterus  CV: RRR, JVP ~8-10, trace bilateral LE edema  CHEST: CTAB  ABD: soft, NT/ND, NABS  NEURO: AA&Ox3, fluent/appropriate, motor grossly  nonfocal  PSYCH: cooperative, affect appropriate    Data   Data reviewed today:  I personally reviewed, see relevant data in A/P  Recent Labs   Lab 03/15/23  2149 03/15/23  2007 03/15/23  2003   WBC  --   --  12.2*   HGB  --  16.3 15.3   MCV  --   --  97   PLT  --   --  200   INR  --   --  1.13    138 136   POTASSIUM 4.5 4.2 4.3   CHLORIDE 102  --  99   CO2 23  --  25   BUN 26.5*  --  27.6*   CR 1.18*  --  1.29*   ANIONGAP 13  --  12   TONY 8.7  --  9.1   * 94 95  98   ALBUMIN 3.6  --  3.8   PROTTOTAL 5.7*  --  6.0*   BILITOTAL 0.7  --  0.7   ALKPHOS 73  --  82   ALT 22  --  24   AST 34  --  35       Recent Results (from the past 24 hour(s))   POC US ECHO LIMITED    Impression    Limited Bedside Cardiac Ultrasound, performed and interpreted by me.   Indication: SYncope vs cardiac arrest.  Parasternal long axis, parasternal short axis, apical 4 chamber and subcostal views were acquired.   Image quality was satisfactory.    Findings:    Abnormal: Severely reduced LVEF, grossly dilated ventricles.  No visualized pericardial effusion.  No visualized right heart strain.  Bilateral sliding signs without predominance of B-lines.    IMPRESSION: Abnormal limited cardiac ultrasound showing severely reduced LVEF, grossly dilated ventricles.  No pericardial effusion or visualized evidence of tamponade or right heart strain.  Bilateral sliding signs without evidence of pneumothorax post CPR.  No predominance of B-lines suggesting no severe pulmonary edema.  No pericardial effusion.     XR Chest Port 1 View    Narrative    EXAM:  XR CHEST PORT 1 VIEW    INDICATION: Chest pain, received CPR, hx CHF    COMPARISON:  Chest x-ray 12/4/2018    FINDINGS:  Single AP view of the chest. Left upper chest cardiac device with  single lead ventricular lead.    Enlarged cardiac silhouette. Diffuse reticular opacities. Prominent  central vascular. No pneumothorax.  No pleural effusion.  Unremarkable  upper abdomen. No acute bony  lesions.      Impression    IMPRESSION:  Cardiomegaly with diffuse reticular opacities, likely pulmonary edema.    I have personally reviewed the examination and initial interpretation  and I agree with the findings.    NICOL FAJARDO MD         SYSTEM ID:  [id number]   Cardiac Device Check - Remote   Result Value    Date Time Interrogation Session [id number]    Implantable Pulse Generator  [employer]    Implantable Pulse Generator Model D150 DYNAGEN    Implantable Pulse Generator Serial Number [id number]    Type Interrogation Session Remote    Clinic Name [facility]    Implantable Pulse Generator Type Defibrillator    Implantable Pulse Generator Implant Date 20170608    Implantable Lead  [employer]    Implantable Lead Model 0293 [place] 4-Site SG    Implantable Lead Serial Number [id number]    Implantable Lead Implant Date 20170608    Implantable Lead Polarity Type Bipolar Lead    Implantable Lead Location Detail 1 UNKNOWN    Implantable Lead Location Right Ventricle    Jesus Setting Mode (NBG Code) VVI    Jesus Setting Lower Rate Limit 40    Lead Channel Setting Sensing Polarity Bipolar    Lead Channel Setting Sensing Sensitivity 0.6    Lead Channel Setting Sensing Adaptation Mode Adaptive    Lead Channel Setting Pacing Polarity Bipolar    Lead Channel Setting Pacing Pulse Width 0.4    Lead Channel Setting Pacing Amplitude 2.0    Zone Setting Type Category VF    Zone Setting Vendor Type Category VF    Zone Setting Detection Interval 300    Zone Setting Type Category VT    Zone Setting Vendor Type Category VT    Zone Setting Detection Interval 353    Lead Channel Impedance Value 416    Lead Channel Pacing Threshold Amplitude 0.9    Lead Channel Pacing Threshold Pulse Width 0.4    Battery Date Time of Measurements 20230315201900    Battery Status Beginning of Service    Battery Remaining Longevity 132    Battery Remaining Percentage 100    Capacitor Charge  Type Reformation    Capacitor Last Charge Date Time [id number]    Capacitor Charge Time 10.4    Jesus Statistic Date Time Start 20210128000000    Jesus Statistic Date Time End [id number]    Jesus Statistic RV Percent Paced 0    Therapy Statistic Recent Shocks Delivered 0    Therapy Statistic Recent Shocks Aborted 0    Therapy Statistic Recent ATP Delivered 0    Therapy Statistic Recent Date Time Start 20210128000000    Therapy Statistic Recent Date Time End [id number]    Therapy Statistic Total Shocks Delivered 0    Therapy Statistic Total Shocks Aborted 0    Therapy Statistic Total ATP Delivered 0    Therapy Statistic Total  Date Time Start 20170608000000    Therapy Statistic Total  Date Time End [id number]    Episode Statistic Recent Count 13    Episode Statistic Type Category Other    Episode Statistic Recent Count 1    Episode Statistic Type Category VT    Episode Statistic Vendor Type Category NSVT    Episode Statistic Recent Count 0    Episode Statistic Type Category VF    Episode Statistic Vendor Type Category VF    Episode Statistic Recent Count 0    Episode Statistic Type Category VT    Episode Statistic Vendor Type Category VT    Episode Statistic Recent Count 0    Episode Statistic Type Category VT    Episode Statistic Vendor Type Category VT-1    Episode Statistic Recent Count 1    Episode Statistic Type Category VT    Episode Statistic Recent Date Time Start 20221206000000    Episode Statistic Recent Date Time End [id number]    Episode Statistic Recent Date Time Start 20221206000000    Episode Statistic Recent Date Time End 04183368226955    Episode Statistic Recent Date Time Start 20221206000000    Episode Statistic Recent Date Time End [id number]    Episode Statistic Recent Date Time Start 20221206000000    Episode Statistic Recent Date Time End 01908146269509    Episode Statistic Recent Date Time Start 59930807314126    Episode Statistic Recent Date Time End [id number]     Episode Statistic Recent Date Time Start 75434689691852    Episode Statistic Recent Date Time End [id number]    Episode Identifier V-47    Episode Type Category VT    Episode Vendor Type Category VT    Episode Date Time [id number]    Episode Duration 343    Episode Identifier V-46    Episode Type Category VT    Episode Vendor Type Category VT    Episode Date Time [id number]    Episode Duration 15    Narrative    [employer] Lat[employer] Consult remote ICD transmission received from ER and reviewed. Device transmission sent per MD orders.     Device: [place] D150 DYNAGEN  Normal Device Function  Mode: VVI 40 bpm  : 0%  Presenting EGM: VS @ 81 bpm  Lead Trends Appear Stable: yes  Estimated battery longevity to RRT = 11 years  Ventricular Arrhythmia: 1 episode recorded in the VT monitor zone on 3/15/23 @ 1803 - 199 bpm, 5 min 43 sec.  1 episode recorded on 1/22/23 @ 1818 - 15 sec, 180 bpm.  ER RN Notified of Transmission Results: Yes    DEL Gao RN    Remote ICD Transmission    I have reviewed and interpreted the device interrogation, settings, programming and nurse's summary. The device is functioning within normal device parameters. I agree with the current findings, assessment and plan.       Cardiopulmonary exercise test (03/08/2021):  Limited peak VO2 with both cardiac and pulmonary impairment to exercise, peak VO2 13.3 mL/kg/minute, abnormal chronotropic response with peak heart rate of 127, blunted blood pressure response, peak METs 2.8 METs, frequent ventricular ectopy.     Echo Transthoracic (TTE, 5/5/2022):  1. Severely enlarged left ventricular chamber size, severe generalized hypokinesis, calculated 2-D linear ejection fraction 10%.  2. Left ventricular cardiac index 2.5 l/min/m2.  3. Moderate-severe tricuspid valve regurgitation.  4. Mildly enlarged right ventricular chamber size, normal systolic function, estimated right ventricular systolic pressure 67 mmHg (systolic blood  pressure 95 mmHg).  5. Elevated left ventricular filling pressure.  6. No pericardial effusion.  7. No mass/thrombus visualized in the left ventricle with ultrasound enhancement agent (Lumason).  8. Compared to the report of 02/24/2021 no significant change has occurred.

## 2023-03-16 NOTE — PROGRESS NOTES
BRIEF UPDATE    Called regarding blood pressures consistently upper 70s over upper 50s, most recent 76/58 around 0200. Patient is asymptomatic. This was a slight decrease since starting amiodarone, and possible added beta blockade effect of amiodarone may have dropped bp. For now has received 75 mg bolus and had just recently started gtt. Will stop the gtt.    Upon further history, it appears on several office visits he has been as low as upper 70s/50s and asymptomatic. Likely runs very low at home and has been continued on lisinopril 5 mg BID cautiously.    Will monitor closely overnight.    Discussed with cardiology fellow Dr. Naila Hoffman MD  Internal Medicine, PGY-3  Pager: [phone]

## 2023-03-16 NOTE — CONSULTS
REASON FOR ASSESSMENT:  CHF Consult for 2 gm NA Diet Education    NUTRITION HISTORY:    Information obtained from:  Patient    Living situation:   Patient lives at home with wife and 18-year old son.    Grocery shopping:  Wife and son    Meal preparation:  Patient shared that he does most of the cooking at his house.    Breakfast:  Scrambled eggs, plain toast, 1 piece of [place]    Lunch:  1/2 turkey sandwich with mustard    Dinner:   Chicken tenders, pasta, oatmeal, omelets, salads    Snacks:  Crackers (no salt added), pretzels (no salt added), 8 oz can coke (x2/day)    Previous diet instructions:  Akshat reported that he has received some education on a low-sodium diet in the past. He tries to watch his sodium intake at home and seems familiar with amounts of sodium in the foods he typically eats (was able to report exact quantities in bread, turkey, coke, etc). In the past, he recalled being told to stay below 2300 mg sodium, so the recommendation of less than 2000 mg sodium was new to him. He stated that he tries to track his sodium and will now aim for less than 2 g Na. His wife and son are also familiar with his low sodium dietary needs and keep this in mind while grocery shopping. Akshat explained that he does not cook with salt or add any salt to his foods, and his family buys crackers/pretzels/etc. with no added salt. Despite receiving education years ago for CHF, Akshat was interested in the handouts and discussing tips for low-sodium diet. Writer further discussed other components of diet for heart failure, such as fluid restriction.       CURRENT DIET:  2 g Sodium Diet  2000 mL Fluid Restriction    NUTRITION DIAGNOSIS:  Food- and nutrition-related knowledge deficit R/t patient has not received CHF/low Na diet education in several years as evidenced by dietary recall of some high sodium foods.    INTERVENTIONS:    Nutrition Prescription:  Patient will limit sodium to 2 g/day and fluid to 2000  mL/day.    Implementation:    Assessed learning needs, learning preferences, and willingness to learn    Nutrition Education (Content):  a) Provided handouts:  1) Heart Failure Nutrition Therapy  2) Sodium-Free Flavoring Tips  b) Discussed rational for limiting Na for CHF and stressed importance of following 2 gm Na guidelines   c) Encouraged patient to keep a daily food record    Nutrition Education (Application):  a) Discussed current eating habits and recommended alternative food choices    Anticipated good compliance    Diet Education - refer to Education Flowsheet    Goals:    Patient verbalizes understanding of diet by demonstrating knowledge of reading labels for sodium and repeating sodium and fluid restrictions back to writer.    All of the above goals met during the education session    Follow Up:    Provided RD contact information for future questions.    Recommend Out-Patient Nutrition Referral, if further diet instructions are needed.        Milagro Pascal  Dietetic Intern

## 2023-03-16 NOTE — PLAN OF CARE
Major Shift Events: [place] placed at bedside. Lasix drip started. Voiding adequately at bedside. 2g Na diet started.NITA numbers Q6. Pt reports rib pain, PRNs given with some relief.   Plan: Continue to monitor per plan of care.   For vital signs and complete assessments, please see documentation flowsheets.

## 2023-03-16 NOTE — ED PROVIDER NOTES
[place] EMERGENCY DEPARTMENT ([facility])    3/15/23       ED PROVIDER NOTE  ED03      History     Chief Complaint   Patient presents with     Loss of Consciousness     The history is provided by the patient, the EMS personnel and medical records.     Akshat Fragoso is a 57 year old male with a previous medical history significant for HFrEF (8-10%), nonischemic cardiomyopathy ([employer] ICD in place - 2017), COPD, stage 3 CKD, and nicotine dependence presenting to the ED via EMS for syncopal episode.  Per EMS, patient had gone into cardiac arrest and was receiving CPR by patient's son when they arrived.  Patient's blood pressure had been 80/60 upon EMS arrival.  Patient does have a defibrillator and a pacemaker placed, however the pacemaker was not activated.    Patient states that he had finished eating dinner and was laying in his bed when he lost consciousness.  He does not remember anything else, states that he was never shocked by his pacemaker.  He reports feeling normal today other than intermittent nausea.  States that he did have some bilateral leg pain which he took a Percocet for, but this is typical for him.  He does endorse some chest and rib pain currently, which he attributes to be from CPR.  Endorses smoking, however no smoking today.     Past Medical History  Past Medical History:   Diagnosis Date     Acute right lumbar radiculopathy 11/02/2015     Acute systolic heart failure (H) 01/10/2017     Cardiomyopathy (H) 01/10/2017     CKD (chronic kidney disease) stage 3, GFR 30-59 ml/min (H) 01/30/2020     JOHNSON (dyspnea on exertion)      ED (erectile dysfunction) 10/02/2019     Erectile dysfunction      ICD (implantable cardioverter-defibrillator) in place- [employer], single chamber- NOT dependent 10/09/2017     Personal history of smoking 01/04/2017     Pulmonary nodules 01/17/2017    CT 11/2018:  Bilateral pulmonary nodules measuring up to 4 mm. No new or enlarging pulmonary  nodules.     Past Surgical History:   Procedure Laterality Date     CARDIAC SURGERY  2016    defibrillator placement     CV RIGHT HEART CATH MEASUREMENTS RECORDED N/A 11/20/2019    Procedure: CV RIGHT HEART CATH;  Surgeon: Jeff Aguilar MD;  Location:  HEART CARDIAC CATH LAB     None       digoxin (LANOXIN) 125 MCG tablet  albuterol (PROAIR HFA/PROVENTIL HFA/VENTOLIN HFA) 108 (90 Base) MCG/ACT inhaler  aspirin (ASA) 81 MG EC tablet  aspirin 81 MG tablet  carvedilol (COREG) 12.5 MG tablet  furosemide (LASIX) 20 MG tablet  HYDROcodone-acetaminophen (NORCO) 5-325 MG tablet  lisinopril (ZESTRIL) 5 MG tablet  sildenafil (VIAGRA) 50 MG tablet  spironolactone (ALDACTONE) 25 MG tablet      Allergies   Allergen Reactions     No Known Allergies      Family History  Family History   Problem Relation Age of Onset     Parkinsonism Mother      Atrial fibrillation Father      Heart Failure Father      Prostate Cancer Father      Skin Cancer Father      Anorexia nervosa Daughter      Other - See Comments Granddaughter         premature birth     Social History   Social History     Tobacco Use     Smoking status: Light Smoker     Packs/day: 0.25     Years: 15.00     Pack years: 3.75     Types: Cigarettes     Smokeless tobacco: Former     Quit date: 10/24/2011   Vaping Use     Vaping Use: Never used   Substance Use Topics     Alcohol use: No     Alcohol/week: 0.0 standard drinks     Drug use: No      Past medical history, past surgical history, medications, allergies, family history, and social history were reviewed with the patient. No additional pertinent items.      A medically appropriate review of systems was performed with pertinent positives and negatives noted in the HPI, and all other systems negative.    Physical Exam   BP: 96/72  Pulse: 85  Temp: 97.1  F (36.2  C)  Resp: 20  SpO2: 97 %  Physical Exam  Vital Signs Reviewed  Gen: Well nourished, well developed, resting comfortably, no acute distress  HEENT: NC/AT,  PERRL, EOMI, MMM  Neck: Supple, FROM  CV: Diminished heart sounds, regular rate and rhythm, equal and strong pulses  Lungs/Chest: Normal Effort, CTAB  Abd: Non-distended, non-tender  MSK/Back: FROM, no visible deformity  Neuro: A&Ox3, GCS 15, CN II-XII unremarkable  Skin: Warm, Dry, Intact, no visible lesions    ED Course, Procedures, & Data     ED Course as of 03/16/23 0320   Wed Mar 15, 2023   2000 Patient was seen and assessed in handoff was taken directly from EMS.  There was a very limited prehospital report from [facility] so cardiology was contacted to discuss preactivation of Cath Lab. Pt is awake and following commands.  EKG reviewed, modified Sgarbossa negative.  I will proceed with further work-up of syncope versus arrhythmia induced cardiac arrest.   2023 Patient is resting comfortably and stable.  Dr. Yoo of cardiology saw patient at the bedside in response to earlier phone call with cardiology.  Appreciate assistance.  We are both in agreement that we will not activate Cath Lab at this time.  EKG appears to be modified Sgarbossa negative.  Possible arrhythmia from his heart failure.  We will continue further work-up with labs and chest x-ray, interrogation of device.  Patient will need to be admitted to [place] [address].   2256 Discussed the work-up and results with Dr. Hilton of cardiology.  It appears the patient had a run of ventricular tachycardia that was outside of the threshold for intervention of his device.  Discussed elevated D-dimer.  Given the context of the visit today PE is less likely and she agrees we can defer CT angiogram at this time.  Patient will be admitted to her service.  Cardiology fellow to reprogram device at the bedside.  Patient and daughter updated at bedside.     Procedures  Results for orders placed during the hospital encounter of 03/15/23    POC US ECHO LIMITED    Impression  Limited Bedside Cardiac Ultrasound, performed and interpreted by me.  Indication: SYncope vs cardiac  arrest.  Parasternal long axis, parasternal short axis, apical 4 chamber and subcostal views were acquired.  Image quality was satisfactory.    Findings:  Abnormal: Severely reduced LVEF, grossly dilated ventricles.  No visualized pericardial effusion.  No visualized right heart strain.  Bilateral sliding signs without predominance of B-lines.    IMPRESSION: Abnormal limited cardiac ultrasound showing severely reduced LVEF, grossly dilated ventricles.  No pericardial effusion or visualized evidence of tamponade or right heart strain.  Bilateral sliding signs without evidence of pneumothorax post CPR.  No predominance of B-lines suggesting no severe pulmonary edema.  No pericardial effusion.       ED Course Selections:        EKG Interpretation:      Interpreted by Sid Chaney MD  Time reviewed: 2017  Symptoms at time of EKG: Chest Pain   Rhythm: normal sinus   Rate: Normal  Axis: Left Axis Deviation  Ectopy: none  Conduction: left bundle branch block (complete)  ST Segments/ T Waves: Nondiagnostic ST segment changes not consistent with positive modified Sgarbossa criteria  Q Waves: none  Comparison to prior: New development of ventricular conduction delay from 2018, ventricular conduction delay noted on Dawson description of EKG from 2022    Clinical Impression: No ROBINSON/STEMI            Results for orders placed or performed during the hospital encounter of 03/15/23   XR Chest Port 1 View     Status: None    Narrative    EXAM:  XR CHEST PORT 1 VIEW    INDICATION: Chest pain, received CPR, hx CHF    COMPARISON:  Chest x-ray 12/4/2018    FINDINGS:  Single AP view of the chest. Left upper chest cardiac device with  single lead ventricular lead.    Enlarged cardiac silhouette. Diffuse reticular opacities. Prominent  central vascular. No pneumothorax.  No pleural effusion.  Unremarkable  upper abdomen. No acute bony lesions.      Impression    IMPRESSION:  Cardiomegaly with diffuse reticular opacities, likely  pulmonary edema.    I have personally reviewed the examination and initial interpretation  and I agree with the findings.    NICOL FAJARDO MD         SYSTEM ID:  [id number]   POC US ECHO LIMITED     Status: None    Impression    Limited Bedside Cardiac Ultrasound, performed and interpreted by me.   Indication: SYncope vs cardiac arrest.  Parasternal long axis, parasternal short axis, apical 4 chamber and subcostal views were acquired.   Image quality was satisfactory.    Findings:    Abnormal: Severely reduced LVEF, grossly dilated ventricles.  No visualized pericardial effusion.  No visualized right heart strain.  Bilateral sliding signs without predominance of B-lines.    IMPRESSION: Abnormal limited cardiac ultrasound showing severely reduced LVEF, grossly dilated ventricles.  No pericardial effusion or visualized evidence of tamponade or right heart strain.  Bilateral sliding signs without evidence of pneumothorax post CPR.  No predominance of B-lines suggesting no severe pulmonary edema.  No pericardial effusion.     [place] Draw     Status: None    Narrative    The following orders were created for panel order [place] Draw.  Procedure                               Abnormality         Status                     ---------                               -----------         ------                     Extra Blue Top Tube[[id number]]                              Final result               Extra Red Top Tube[[id number]]                               Final result               Extra Green Top (Lithium...[[id number]]                      Final result               Extra Purple Top Tube[[id number]]                            Final result                 Please view results for these tests on the individual orders.   Extra Blue Top Tube     Status: None   Result Value Ref Range    Hold Specimen JIC    Extra Red Top Tube     Status: None   Result Value Ref Range    Hold Specimen JIC    Extra Green Top (Lithium Heparin) Tube      Status: None   Result Value Ref Range    Hold Specimen JIC    Extra Purple Top Tube     Status: None   Result Value Ref Range    Hold Specimen JIC    INR     Status: Normal   Result Value Ref Range    INR 1.13 0.85 - 1.15   D dimer quantitative     Status: Abnormal   Result Value Ref Range    D-Dimer Quantitative 1.54 (H) 0.00 - 0.50 ug/mL FEU    Narrative    This D-dimer assay is intended for use in conjunction with a clinical pretest probability assessment model to exclude pulmonary embolism (PE) and deep venous thrombosis (DVT) in outpatients suspected of PE or DVT. The cut-off value is 0.50 ug/mL FEU.   Comprehensive metabolic panel     Status: Abnormal   Result Value Ref Range    Sodium 136 136 - 145 mmol/L    Potassium 4.3 3.4 - 5.3 mmol/L    Chloride 99 98 - 107 mmol/L    Carbon Dioxide (CO2) 25 22 - 29 mmol/L    Anion Gap 12 7 - 15 mmol/L    Urea Nitrogen 27.6 (H) 6.0 - 20.0 mg/dL    Creatinine 1.29 (H) 0.67 - 1.17 mg/dL    Calcium 9.1 8.6 - 10.0 mg/dL    Glucose 98 70 - 99 mg/dL    Alkaline Phosphatase 82 40 - 129 U/L    AST 35 10 - 50 U/L    ALT 24 10 - 50 U/L    Protein Total 6.0 (L) 6.4 - 8.3 g/dL    Albumin 3.8 3.5 - 5.2 g/dL    Bilirubin Total 0.7 <=1.2 mg/dL    GFR Estimate 65 >60 mL/min/1.73m2   Troponin T, High Sensitivity     Status: Abnormal   Result Value Ref Range    Troponin T, High Sensitivity 31 (H) <=22 ng/L   iStat Gases Electrolytes ICA Glucose Venous, POCT     Status: Abnormal   Result Value Ref Range    CPB Applied No     Hematocrit POCT 48 40 - 53 %    Calcium, Ionized Whole Blood POCT 4.7 4.4 - 5.2 mg/dL    Glucose Whole Blood POCT 94 70 - 99 mg/dL    Bicarbonate Venous POCT 27 21 - 28 mmol/L    Hemoglobin POCT 16.3 13.3 - 17.7 g/dL    Potassium POCT 4.2 3.4 - 5.3 mmol/L    Sodium POCT 138 133 - 144 mmol/L    pCO2 Venous POCT 47 40 - 50 mm Hg    pO2 Venous POCT 29 25 - 47 mm Hg    pH Venous POCT 7.37 7.32 - 7.43    O2 Sat, Venous POCT 53 (L) 94 - 100 %   iStat Troponin, POCT      Status: Normal   Result Value Ref Range    TROPPC POCT 0.02 <=0.12 ug/L   Extra Tube ([place] Draw)     Status: None    Narrative    The following orders were created for panel order Extra Tube ([place] Draw).  Procedure                               Abnormality         Status                     ---------                               -----------         ------                     Extra Green Top (Lithium...[[id number]]                      Final result               Extra Purple Top Tube[[id number]]                            Final result                 Please view results for these tests on the individual orders.   Extra Green Top (Lithium Heparin) Tube     Status: None   Result Value Ref Range    Hold Specimen JIC    Extra Purple Top Tube     Status: None   Result Value Ref Range    Hold Specimen JIC    CBC with platelets and differential     Status: Abnormal   Result Value Ref Range    WBC Count 12.2 (H) 4.0 - 11.0 10e3/uL    RBC Count 5.05 4.40 - 5.90 10e6/uL    Hemoglobin 15.3 13.3 - 17.7 g/dL    Hematocrit 49.0 40.0 - 53.0 %    MCV 97 78 - 100 fL    MCH 30.3 26.5 - 33.0 pg    MCHC 31.2 (L) 31.5 - 36.5 g/dL    RDW 14.4 10.0 - 15.0 %    Platelet Count 200 150 - 450 10e3/uL    % Neutrophils 55 %    % Lymphocytes 31 %    % Monocytes 8 %    % Eosinophils 4 %    % Basophils 1 %    % Immature Granulocytes 1 %    NRBCs per 100 WBC 0 <1 /100    Absolute Neutrophils 6.7 1.6 - 8.3 10e3/uL    Absolute Lymphocytes 3.8 0.8 - 5.3 10e3/uL    Absolute Monocytes 1.0 0.0 - 1.3 10e3/uL    Absolute Eosinophils 0.5 0.0 - 0.7 10e3/uL    Absolute Basophils 0.1 0.0 - 0.2 10e3/uL    Absolute Immature Granulocytes 0.1 <=0.4 10e3/uL    Absolute NRBCs 0.0 10e3/uL   Glucose by meter     Status: Normal   Result Value Ref Range    GLUCOSE BY METER POCT 95 70 - 99 mg/dL   Troponin T, High Sensitivity     Status: Abnormal   Result Value Ref Range    Troponin T, High Sensitivity 40 (H) <=22 ng/L   UA with Microscopic reflex to Culture      Status: Abnormal    Specimen: Urine, Midstream   Result Value Ref Range    Color Urine Yellow Colorless, Straw, Light Yellow, Yellow    Appearance Urine Clear Clear    Glucose Urine Negative Negative mg/dL    Bilirubin Urine Negative Negative    Ketones Urine Negative Negative mg/dL    Specific Gravity Urine 1.020 1.003 - 1.035    Blood Urine Negative Negative    pH Urine 6.0 5.0 - 7.0    Protein Albumin Urine 200 (A) Negative mg/dL    Urobilinogen Urine 2.0 Normal, 2.0 mg/dL    Nitrite Urine Negative Negative    Leukocyte Esterase Urine Negative Negative    Mucus Urine Present (A) None Seen /LPF    RBC Urine 2 <=2 /HPF    WBC Urine 6 (H) <=5 /HPF    Narrative    Urine Culture not indicated   Lactic acid whole blood     Status: Normal   Result Value Ref Range    Lactic Acid 1.3 0.7 - 2.0 mmol/L   Comprehensive metabolic panel     Status: Abnormal   Result Value Ref Range    Sodium 138 136 - 145 mmol/L    Potassium 4.5 3.4 - 5.3 mmol/L    Chloride 102 98 - 107 mmol/L    Carbon Dioxide (CO2) 23 22 - 29 mmol/L    Anion Gap 13 7 - 15 mmol/L    Urea Nitrogen 26.5 (H) 6.0 - 20.0 mg/dL    Creatinine 1.18 (H) 0.67 - 1.17 mg/dL    Calcium 8.7 8.6 - 10.0 mg/dL    Glucose 121 (H) 70 - 99 mg/dL    Alkaline Phosphatase 73 40 - 129 U/L    AST 34 10 - 50 U/L    ALT 22 10 - 50 U/L    Protein Total 5.7 (L) 6.4 - 8.3 g/dL    Albumin 3.6 3.5 - 5.2 g/dL    Bilirubin Total 0.7 <=1.2 mg/dL    GFR Estimate 73 >60 mL/min/1.73m2   Magnesium     Status: Normal   Result Value Ref Range    Magnesium 2.1 1.7 - 2.3 mg/dL   N - Terminal Pro BNP Inpatient     Status: Abnormal   Result Value Ref Range    N terminal Pro BNP Inpatient 13,200 (H) 0 - 900 pg/mL   Digoxin level     Status: Normal   Result Value Ref Range    Digoxin 0.9 0.6 - 2.0 ng/mL   Extra Tube     Status: None (In process)    Narrative    The following orders were created for panel order Extra Tube.  Procedure                               Abnormality         Status                      ---------                               -----------         ------                     Extra Purple Top Tube[[id number]]                            In process                   Please view results for these tests on the individual orders.   Cardiac Device Check - Remote     Status: None (In process)   Result Value Ref Range    Date Time Interrogation Session [id number]     Implantable Pulse Generator  [employer]     Implantable Pulse Generator Model D150 [place]     Implantable Pulse Generator Serial Number [id number]     Type Interrogation Session Remote     Clinic Name [facility]     Implantable Pulse Generator Type Defibrillator     Implantable Pulse Generator Implant Date 20170608     Implantable Lead  [employer]     Implantable Lead Model 0293 [place] 4-Site SG     Implantable Lead Serial Number [id number]     Implantable Lead Implant Date 20170608     Implantable Lead Polarity Type Bipolar Lead     Implantable Lead Location Detail 1 UNKNOWN     Implantable Lead Location Right Ventricle     Jesus Setting Mode (NBG Code) VVI     Jesus Setting Lower Rate Limit 40 [beats]/min    Lead Channel Setting Sensing Polarity Bipolar     Lead Channel Setting Sensing Sensitivity 0.6 mV    Lead Channel Setting Sensing Adaptation Mode Adaptive     Lead Channel Setting Pacing Polarity Bipolar     Lead Channel Setting Pacing Pulse Width 0.4 ms    Lead Channel Setting Pacing Amplitude 2.0 V    Zone Setting Type Category VF     Zone Setting Vendor Type Category VF     Zone Setting Detection Interval 300 ms    Zone Setting Type Category VT     Zone Setting Vendor Type Category VT     Zone Setting Detection Interval 353 ms    Lead Channel Impedance Value 416 ohm    Lead Channel Pacing Threshold Amplitude 0.9 V    Lead Channel Pacing Threshold Pulse Width 0.4 ms    Battery Date Time of Measurements 20230315201900     Battery Status Beginning of Service     Battery Remaining  Longevity 132 mo    Battery Remaining Percentage 100 %    Capacitor Charge Type Reformation     Capacitor Last Charge Date Time [id number]     Capacitor Charge Time 10.4 s    Jesus Statistic Date Time Start 20210128000000     Jesus Statistic Date Time End 20230315000000     Jesus Statistic RV Percent Paced 0 %    Therapy Statistic Recent Shocks Delivered 0     Therapy Statistic Recent Shocks Aborted 0     Therapy Statistic Recent ATP Delivered 0     Therapy Statistic Recent Date Time Start 20210128000000     Therapy Statistic Recent Date Time End 20230315000000     Therapy Statistic Total Shocks Delivered 0     Therapy Statistic Total Shocks Aborted 0     Therapy Statistic Total ATP Delivered 0     Therapy Statistic Total  Date Time Start 20170608000000     Therapy Statistic Total  Date Time End 20230315000000     Episode Statistic Recent Count 13     Episode Statistic Type Category Other     Episode Statistic Recent Count 1     Episode Statistic Type Category VT     Episode Statistic Vendor Type Category NSVT     Episode Statistic Recent Count 0     Episode Statistic Type Category VF     Episode Statistic Vendor Type Category VF     Episode Statistic Recent Count 0     Episode Statistic Type Category VT     Episode Statistic Vendor Type Category VT     Episode Statistic Recent Count 0     Episode Statistic Type Category VT     Episode Statistic Vendor Type Category VT-1     Episode Statistic Recent Count 1     Episode Statistic Type Category VT     Episode Statistic Recent Date Time Start 20221206000000     Episode Statistic Recent Date Time End [id number]     Episode Statistic Recent Date Time Start 20221206000000     Episode Statistic Recent Date Time End [id number]     Episode Statistic Recent Date Time Start 20221206000000     Episode Statistic Recent Date Time End [id number]     Episode Statistic Recent Date Time Start 20221206000000     Episode Statistic Recent Date Time End 85006133220357      Episode Statistic Recent Date Time Start 18185439316728     Episode Statistic Recent Date Time End 30747486246426     Episode Statistic Recent Date Time Start 20221206000000     Episode Statistic Recent Date Time End 20230315000000     Episode Identifier V-[id number]     Episode Type Category VT     Episode Vendor Type Category VT     Episode Date Time [id number]     Episode Duration 343 s    Episode Identifier V-46     Episode Type Category VT     Episode Vendor Type Category VT     Episode Date Time [id number]     Episode Duration 15 s    Narrative    [employer] Lat[employer] Consult remote ICD transmission received from ER and reviewed. Device transmission sent per MD orders.     Device: [employer] D150 DYNAGEN  Normal Device Function  Mode: VVI 40 bpm  : 0%  Presenting EGM: VS @ 81 bpm  Lead Trends Appear Stable: yes  Estimated battery longevity to RRT = 11 years  Ventricular Arrhythmia: 1 episode recorded in the VT monitor zone on 3/15/23 @ 1803 - 199 bpm, 5 min 43 sec.  1 episode recorded on 1/22/23 @ 1818 - 15 sec, 180 bpm.  ER RN Notified of Transmission Results: Yes    DEL Gao RN    Remote ICD Transmission    I have reviewed and interpreted the device interrogation, settings, programming and nurse's summary. The device is functioning within normal device parameters. I agree with the current findings, assessment and plan.   CBC with platelets differential     Status: Abnormal    Narrative    The following orders were created for panel order CBC with platelets differential.  Procedure                               Abnormality         Status                     ---------                               -----------         ------                     CBC with platelets and d...[[id number]]  Abnormal            Final result                 Please view results for these tests on the individual orders.     Medications   Continuing ACE inhibitor/ARB/ARNI from home medication list OR ACE inhibitor/ARB/ARNI  order already placed during this visit (has no administration in time range)   Continuing beta blocker from home medication list OR beta blocker order already placed during this visit (has no administration in time range)   amiodarone (CORDARONE) infusion ADULT STANDARD 900 mg/500 mL (1.8 mg/mL) mixture (0 mg/min Intravenous Stopped 3/16/23 0219)   amiodarone (CORDARONE) infusion ADULT STANDARD 900 mg/500 mL (1.8 mg/mL) mixture (has no administration in time range)   albuterol (PROVENTIL HFA/VENTOLIN HFA) inhaler (has no administration in time range)   aspirin EC tablet 81 mg (has no administration in time range)   carvedilol (COREG) tablet 12.5 mg (has no administration in time range)   digoxin (LANOXIN) tablet 125 mcg (has no administration in time range)   spironolactone (ALDACTONE) half-tab 12.5 mg (has no administration in time range)   lisinopril (ZESTRIL) tablet 5 mg (has no administration in time range)   furosemide (LASIX) injection 40 mg (has no administration in time range)   acetaminophen (TYLENOL) tablet 650 mg (650 mg Oral $Given 3/16/23 0126)   enoxaparin ANTICOAGULANT (LOVENOX) injection 40 mg ( Subcutaneous Automatically Held 3/19/23 0800)   ipratropium - albuterol 0.5 mg/2.5 mg/3 mL (DUONEB) neb solution 3 mL (3 mLs Nebulization $Given 3/15/23 2137)   amiodarone (NEXTERONE) bolus 75 mg (0 mg Intravenous Stopped 3/16/23 0050)     Labs Ordered and Resulted from Time of ED Arrival to Time of ED Departure   D DIMER QUANTITATIVE - Abnormal       Result Value    D-Dimer Quantitative 1.54 (*)    COMPREHENSIVE METABOLIC PANEL - Abnormal    Sodium 136      Potassium 4.3      Chloride 99      Carbon Dioxide (CO2) 25      Anion Gap 12      Urea Nitrogen 27.6 (*)     Creatinine 1.29 (*)     Calcium 9.1      Glucose 98      Alkaline Phosphatase 82      AST 35      ALT 24      Protein Total 6.0 (*)     Albumin 3.8      Bilirubin Total 0.7      GFR Estimate 65     TROPONIN T, HIGH SENSITIVITY - Abnormal     Troponin T, High Sensitivity 31 (*)    ISTAT GASES ELECTROLYTES ICA GLUCOSE VENOUS POCT - Abnormal    CPB Applied No      Hematocrit POCT 48      Calcium, Ionized Whole Blood POCT 4.7      Glucose Whole Blood POCT 94      Bicarbonate Venous POCT 27      Hemoglobin POCT 16.3      Potassium POCT 4.2      Sodium POCT 138      pCO2 Venous POCT 47      pO2 Venous POCT 29      pH Venous POCT 7.37      O2 Sat, Venous POCT 53 (*)    CBC WITH PLATELETS AND DIFFERENTIAL - Abnormal    WBC Count 12.2 (*)     RBC Count 5.05      Hemoglobin 15.3      Hematocrit 49.0      MCV 97      MCH 30.3      MCHC 31.2 (*)     RDW 14.4      Platelet Count 200      % Neutrophils 55      % Lymphocytes 31      % Monocytes 8      % Eosinophils 4      % Basophils 1      % Immature Granulocytes 1      NRBCs per 100 WBC 0      Absolute Neutrophils 6.7      Absolute Lymphocytes 3.8      Absolute Monocytes 1.0      Absolute Eosinophils 0.5      Absolute Basophils 0.1      Absolute Immature Granulocytes 0.1      Absolute NRBCs 0.0     TROPONIN T, HIGH SENSITIVITY - Abnormal    Troponin T, High Sensitivity 40 (*)    ROUTINE UA WITH MICROSCOPIC REFLEX TO CULTURE - Abnormal    Color Urine Yellow      Appearance Urine Clear      Glucose Urine Negative      Bilirubin Urine Negative      Ketones Urine Negative      Specific Gravity Urine 1.020      Blood Urine Negative      pH Urine 6.0      Protein Albumin Urine 200 (*)     Urobilinogen Urine 2.0      Nitrite Urine Negative      Leukocyte Esterase Urine Negative      Mucus Urine Present (*)     RBC Urine 2      WBC Urine 6 (*)    COMPREHENSIVE METABOLIC PANEL - Abnormal    Sodium 138      Potassium 4.5      Chloride 102      Carbon Dioxide (CO2) 23      Anion Gap 13      Urea Nitrogen 26.5 (*)     Creatinine 1.18 (*)     Calcium 8.7      Glucose 121 (*)     Alkaline Phosphatase 73      AST 34      ALT 22      Protein Total 5.7 (*)     Albumin 3.6      Bilirubin Total 0.7      GFR Estimate 73     NT PROBNP  INPATIENT - Abnormal    N terminal Pro BNP Inpatient 13,200 (*)    INR - Normal    INR 1.13     ISTAT TROPONIN POCT - Normal    TROPPC POCT 0.02     GLUCOSE BY METER - Normal    GLUCOSE BY METER POCT 95     LACTIC ACID WHOLE BLOOD - Normal    Lactic Acid 1.3     MAGNESIUM - Normal    Magnesium 2.1     DIGOXIN LEVEL - Normal    Digoxin 0.9     GLUCOSE MONITOR NURSING POCT   CREATININE     Cardiac Device Check - Remote         XR Chest Port 1 View   Final Result   IMPRESSION:   Cardiomegaly with diffuse reticular opacities, likely pulmonary edema.      I have personally reviewed the examination and initial interpretation   and I agree with the findings.      NICOL FAJARDO MD            SYSTEM ID:  [id number]      POC US ECHO LIMITED   Final Result   Limited Bedside Cardiac Ultrasound, performed and interpreted by me.    Indication: SYncope vs cardiac arrest.   Parasternal long axis, parasternal short axis, apical 4 chamber and subcostal views were acquired.    Image quality was satisfactory.      Findings:     Abnormal: Severely reduced LVEF, grossly dilated ventricles.  No visualized pericardial effusion.  No visualized right heart strain.  Bilateral sliding signs without predominance of B-lines.      IMPRESSION: Abnormal limited cardiac ultrasound showing severely reduced LVEF, grossly dilated ventricles.  No pericardial effusion or visualized evidence of tamponade or right heart strain.  Bilateral sliding signs without evidence of pneumothorax post CPR.  No predominance of B-lines suggesting no severe pulmonary edema.  No pericardial effusion.         Echocardiogram Complete    (Results Pending)          Critical care was performed.   Critical Care Addendum  My initial assessment, based on my review of prehospital provider report, review of nursing observations, review of vital signs, focused history, physical exam, review of cardiac rhythm monitor, 12 lead ECG analysis, discussion with Cardiology and interpretation  of POCUS , established a high suspicion that Akshat Fragoso has a critical arrhythmia, which requires immediate intervention, and therefore he is critically ill.     After the initial assessment, the care team initiated multiple lab tests and consulted with Advanced Heart Failure to provide stabilization care. Due to the critical nature of this patient, I reassessed nursing observations, vital signs, physical exam, 12 lead ECG analysis, mental status and neurologic status multiple times prior to his disposition.     Time also spent performing documentation, reviewing test results, discussion with consultants and coordination of care.     Critical care time (excluding teaching time and procedures): 30 minutes.     Assessment & Plan    Akshat Fragoso is a 57 year old male with a previous medical history significant for HFrEF (8-10%), nonischemic cardiomyopathy ([employer] ICD in place - 2017), COPD, stage 3 CKD, and nicotine dependence presenting to the ED via EMS for syncopal episode.  Per EMS, patient had gone into cardiac arrest and was receiving CPR by patient's son when they arrived.  Patient's blood pressure had been 80/60 upon EMS arrival.  Patient does have a defibrillator and a pacemaker placed, however the pacemaker was not activated.    By report the patient was feeling some intermittent episodes of nausea earlier today and was found unresponsive after calling his wife's name.  CPR was initiated by the patient's son without pulse check performed.  Reportedly by the time first responders/EMS arrived patient was awake and participating in interview, not in cardiac arrest.  He had low normal blood pressures to slight hypotension and otherwise clinically perfusing well.  I reviewed his prehospital strip and his initial twelve-lead EKG which appeared consistent with a bundle branch block or conduction delay with no Sgarbossa criteria suggestion of ROBINSON.  He has a history of severe heart failure and likely  had an insidious onset of arrhythmia.  We will need to investigate why his ICD did not fire/if it fired while he was receiving CPR.  Will investigate for other potential pathology such as pulmonary embolism and ACS.  Bedside ultrasound was performed by myself which showed severe decreased function no evidence of tamponade, sliding sign suggesting no pneumothorax after receiving chest compressions.    Patient will need to be admitted to the cardiology to/advanced heart failure team.  He is discussing transplant versus LVAD with his team at [facility].  He says he is not currently listed for transplant and has not yet decided on LVAD.    Assessment of x-ray shows some possible slight pulmonary edema but he is not having significant respiratory symptoms.  His blood pressure vacillated between the 90s and 80s.  Discussed this with cardiology.  Given his heart failure this is likely not too abnormal for him.  He is clinically well perfused and will continue to monitor this.  Lactate is not elevated.  BNP was delayed but resulted markedly elevated consistent with severe heart failure.  D-dimer was slightly elevated initially had plan on getting CT angiography but after discussions with the cardiology physician Dr. Hilton will defer this study as our suspicion for PE is lower in the setting of a device interrogation.  This device interrogation revealed the patient had runs of ventricular tachycardia that were not treated due to parameters.  Cardiology fellow will come down to the bedside and adjust these parameters.  They also plan to start amiodarone.  No major electrolyte abnormalities to trigger arrhythmia found.    Patient had some blood pressure issues on amiodarone while waiting in the emergency department for inpatient bed.  This drip was stopped and instruction from cardiology per his bedside nurse.  He otherwise remained awake alert if a little bit tired and admitted to the cardiac stepdown unit for further management  evaluation and treatment from our cardiology team.    I have reviewed the nursing notes. I have reviewed the findings, diagnosis, plan and need for follow up with the patient.    New Prescriptions    No medications on file       Final diagnoses:   Ventricular tachycardia   Syncope and collapse   I, Dalia Torres, am serving as a trained medical scribe to document services personally performed by Sid Esqueda MD, based on the provider's statements to me.     I, Sid Esqueda MD, was physically present and have reviewed and verified the accuracy of this note documented by Dalia Torres.      Sid Esqueda Jr., MD   [facility] EMERGENCY DEPARTMENT  3/15/2023     Sid Esqueda MD  03/16/23 0320

## 2023-03-16 NOTE — PROGRESS NOTES
Device interrogation performed which showed the following:    Device: [employer] D150 DYNAGEN  Normal Device Function  Mode: VVI 40 bpm  : 0%  Presenting EGM: VS @ 81 bpm  Lead Trends Appear Stable: yes  Estimated battery longevity to RRT = 11 years  Ventricular Arrhythmia: 1 episode recorded in the VT monitor zone on 3/15/23 @ 1803 - 199 bpm, 5 min 43 sec.  1 episode recorded on 1/22/23 @ 1818 - 15 sec, 180 bpm.  ER RN Notified of Transmission Results: Yes       Reviewed device parameters and VT monitor is set for 170 bpm with no therapies, which is why no therapies were delivered. The timeline appears to be appropriate as his son says that he found him down at 703pm (the device clock is one hour behind).     Discussed with Dr. Hilton, will plan to turn on VT device therapies. Device reprogrammed for ATP burst x2 followed by 41J shock at a VT rate of 170 bpm.     Ming Yoo MD  Cardiology Fellow

## 2023-03-16 NOTE — PROGRESS NOTES
Met with patient and spouse, Landry. Pt and spouse agreed to have a Pre VAD Education (PVE) meeting tomorrow (3/17/23) at 10 AM. They will have their adult children also present either in person or by calling in. The patient stated that he has never had a colonoscopy. Will inform the cardiology team. The patient's wife mentioned that they will be in contact today with their [facility] cardiologist to discuss the patient's situation.

## 2023-03-16 NOTE — PROGRESS NOTES
Admitted/transferred from: 6B  Reason for admission/transfer: Higher level of care for CHF. [place] catheter to be placed.  2 RN skin assessment: completed by Black  Result of skin assessment and interventions/actions: WNL   Height, weight, drug calc weight: Done  Patient belongings (see Flowsheet)  MDRO education added to care planN/A  ?

## 2023-03-16 NOTE — PROCEDURES
Inpatient Procedure Note    Procedure: Ultrasound guided central access, PA catheter placement, central line placement  Indication: Hemodynamic monitoring, central access  Diagnosis: Cardiogenic shock    After informing the benefits and risks, an informed consent was obtained. A time out and site verification were done prior to initiation of the procedure. The neck was prepped and draped in the usual sterile fashion and infiltrated with lidocaine under ultrasound guidance. A 9Fr sheath was placed in the right internal jugular vein using modified Seldinger technique ultrasound guidance. A balloon-tipped [place]-Jalen catheter was advanced into the right atrium, right ventricle, pulmonary artery and pulmonary capillary wedge position with guidance of pressure waveform.     The balloon was deflated and the [place]-Jalen catheter was left in the pulmonary artery. RA/RV/PA/PCW pressures were obtained. [place] locked at 55 cm. At time of procedure completion, all the ports aspirated and flushed properly. The patient tolerated the procedure well without any immediate complications.     Post-procedure x-ray shows the tip of the catheter within the main PA.    Complications: None   Blood loss: Minimal blood loss    BSA 1.92  m2  HR 83 bpm  /72/79 mmHg  RA 16 PA 52/34 PCW 32 mmHg  Carlos CO 2.42, CI 1.26  PA sat 52% Hgb 15.3g/dl     Dr. Yobani MD was present for key portions of the procedure.

## 2023-03-17 ENCOUNTER — APPOINTMENT (OUTPATIENT)
Dept: CT IMAGING | Facility: CLINIC | Age: 58
DRG: 001 | End: 2023-03-17
Attending: STUDENT IN AN ORGANIZED HEALTH CARE EDUCATION/TRAINING PROGRAM
Payer: COMMERCIAL

## 2023-03-17 ENCOUNTER — TELEPHONE (OUTPATIENT)
Dept: NEUROPSYCHOLOGY | Facility: CLINIC | Age: 58
End: 2023-03-17

## 2023-03-17 ENCOUNTER — PREP FOR PROCEDURE (OUTPATIENT)
Dept: CARDIOLOGY | Facility: CLINIC | Age: 58
End: 2023-03-17
Payer: COMMERCIAL

## 2023-03-17 ENCOUNTER — PREP FOR PROCEDURE (OUTPATIENT)
Dept: CARDIOLOGY | Facility: CLINIC | Age: 58
End: 2023-03-17

## 2023-03-17 DIAGNOSIS — I50.9 HEART FAILURE (H): Primary | ICD-10-CM

## 2023-03-17 LAB
ALBUMIN UR-MCNC: NEGATIVE MG/DL
ANION GAP SERPL CALCULATED.3IONS-SCNC: 13 MMOL/L (ref 7–15)
ANION GAP SERPL CALCULATED.3IONS-SCNC: 15 MMOL/L (ref 7–15)
APPEARANCE UR: CLEAR
BASE EXCESS BLDV CALC-SCNC: 10.1 MMOL/L (ref -7.7–1.9)
BASE EXCESS BLDV CALC-SCNC: 4.4 MMOL/L (ref -7.7–1.9)
BASE EXCESS BLDV CALC-SCNC: 5.7 MMOL/L (ref -7.7–1.9)
BILIRUB UR QL STRIP: NEGATIVE
BUN SERPL-MCNC: 32.4 MG/DL (ref 6–20)
BUN SERPL-MCNC: 33.7 MG/DL (ref 6–20)
CALCIUM SERPL-MCNC: 8.6 MG/DL (ref 8.6–10)
CALCIUM SERPL-MCNC: 9.4 MG/DL (ref 8.6–10)
CHLORIDE SERPL-SCNC: 93 MMOL/L (ref 98–107)
CHLORIDE SERPL-SCNC: 95 MMOL/L (ref 98–107)
COLOR UR AUTO: ABNORMAL
CREAT SERPL-MCNC: 1.3 MG/DL (ref 0.67–1.17)
CREAT SERPL-MCNC: 1.52 MG/DL (ref 0.67–1.17)
DEPRECATED HCO3 PLAS-SCNC: 27 MMOL/L (ref 22–29)
DEPRECATED HCO3 PLAS-SCNC: 29 MMOL/L (ref 22–29)
GFR SERPL CREATININE-BSD FRML MDRD: 54 ML/MIN/1.73M2
GFR SERPL CREATININE-BSD FRML MDRD: 65 ML/MIN/1.73M2
GLUCOSE SERPL-MCNC: 125 MG/DL (ref 70–99)
GLUCOSE SERPL-MCNC: 131 MG/DL (ref 70–99)
GLUCOSE UR STRIP-MCNC: NEGATIVE MG/DL
HCO3 BLDV-SCNC: 32 MMOL/L (ref 21–28)
HCO3 BLDV-SCNC: 32 MMOL/L (ref 21–28)
HCO3 BLDV-SCNC: 37 MMOL/L (ref 21–28)
HGB UR QL STRIP: NEGATIVE
HYALINE CASTS: 8 /LPF
KETONES UR STRIP-MCNC: NEGATIVE MG/DL
LEUKOCYTE ESTERASE UR QL STRIP: NEGATIVE
MAGNESIUM SERPL-MCNC: 2 MG/DL (ref 1.7–2.3)
MAGNESIUM SERPL-MCNC: 2.3 MG/DL (ref 1.7–2.3)
MUCOUS THREADS #/AREA URNS LPF: PRESENT /LPF
NITRATE UR QL: NEGATIVE
O2/TOTAL GAS SETTING VFR VENT: 2 %
O2/TOTAL GAS SETTING VFR VENT: 2 %
O2/TOTAL GAS SETTING VFR VENT: 28 %
OXYHGB MFR BLDV: 50 % (ref 70–75)
OXYHGB MFR BLDV: 62 % (ref 70–75)
OXYHGB MFR BLDV: 68 % (ref 70–75)
PCO2 BLDV: 53 MM HG (ref 40–50)
PCO2 BLDV: 56 MM HG (ref 40–50)
PCO2 BLDV: 59 MM HG (ref 40–50)
PH BLDV: 7.35 [PH] (ref 7.32–7.43)
PH BLDV: 7.4 [PH] (ref 7.32–7.43)
PH BLDV: 7.43 [PH] (ref 7.32–7.43)
PH UR STRIP: 5 [PH] (ref 5–7)
PO2 BLDV: 31 MM HG (ref 25–47)
PO2 BLDV: 36 MM HG (ref 25–47)
PO2 BLDV: 39 MM HG (ref 25–47)
POTASSIUM SERPL-SCNC: 3.5 MMOL/L (ref 3.4–5.3)
POTASSIUM SERPL-SCNC: 4.1 MMOL/L (ref 3.4–5.3)
RBC URINE: 1 /HPF
SODIUM SERPL-SCNC: 135 MMOL/L (ref 136–145)
SODIUM SERPL-SCNC: 137 MMOL/L (ref 136–145)
SP GR UR STRIP: 1.01 (ref 1–1.03)
SQUAMOUS EPITHELIAL: <1 /HPF
UROBILINOGEN UR STRIP-MCNC: NORMAL MG/DL
WBC URINE: <1 /HPF

## 2023-03-17 PROCEDURE — 71250 CT THORAX DX C-: CPT | Mod: 26 | Performed by: RADIOLOGY

## 2023-03-17 PROCEDURE — 250N000013 HC RX MED GY IP 250 OP 250 PS 637: Performed by: INTERNAL MEDICINE

## 2023-03-17 PROCEDURE — 999N000155 HC STATISTIC RAPCV CVP MONITORING

## 2023-03-17 PROCEDURE — 250N000009 HC RX 250: Performed by: STUDENT IN AN ORGANIZED HEALTH CARE EDUCATION/TRAINING PROGRAM

## 2023-03-17 PROCEDURE — 250N000013 HC RX MED GY IP 250 OP 250 PS 637

## 2023-03-17 PROCEDURE — 71250 CT THORAX DX C-: CPT

## 2023-03-17 PROCEDURE — 82805 BLOOD GASES W/O2 SATURATION: CPT | Performed by: STUDENT IN AN ORGANIZED HEALTH CARE EDUCATION/TRAINING PROGRAM

## 2023-03-17 PROCEDURE — 83735 ASSAY OF MAGNESIUM: CPT | Performed by: STUDENT IN AN ORGANIZED HEALTH CARE EDUCATION/TRAINING PROGRAM

## 2023-03-17 PROCEDURE — 200N000002 HC R&B ICU UMMC

## 2023-03-17 PROCEDURE — 999N000045 HC STATISTIC DAILY SWAN MONITORING

## 2023-03-17 PROCEDURE — 99291 CRITICAL CARE FIRST HOUR: CPT | Mod: 25 | Performed by: INTERNAL MEDICINE

## 2023-03-17 PROCEDURE — 94640 AIRWAY INHALATION TREATMENT: CPT

## 2023-03-17 PROCEDURE — 82310 ASSAY OF CALCIUM: CPT | Performed by: STUDENT IN AN ORGANIZED HEALTH CARE EDUCATION/TRAINING PROGRAM

## 2023-03-17 PROCEDURE — 70450 CT HEAD/BRAIN W/O DYE: CPT | Mod: 26 | Performed by: STUDENT IN AN ORGANIZED HEALTH CARE EDUCATION/TRAINING PROGRAM

## 2023-03-17 PROCEDURE — 74176 CT ABD & PELVIS W/O CONTRAST: CPT | Mod: 26 | Performed by: RADIOLOGY

## 2023-03-17 PROCEDURE — 94640 AIRWAY INHALATION TREATMENT: CPT | Mod: 76

## 2023-03-17 PROCEDURE — 80048 BASIC METABOLIC PNL TOTAL CA: CPT | Performed by: STUDENT IN AN ORGANIZED HEALTH CARE EDUCATION/TRAINING PROGRAM

## 2023-03-17 PROCEDURE — 258N000003 HC RX IP 258 OP 636: Performed by: STUDENT IN AN ORGANIZED HEALTH CARE EDUCATION/TRAINING PROGRAM

## 2023-03-17 PROCEDURE — 70450 CT HEAD/BRAIN W/O DYE: CPT

## 2023-03-17 PROCEDURE — 999N000015 HC STATISTIC ARTERIAL MONITORING DAILY

## 2023-03-17 PROCEDURE — 999N000157 HC STATISTIC RCP TIME EA 10 MIN

## 2023-03-17 PROCEDURE — 81001 URINALYSIS AUTO W/SCOPE: CPT

## 2023-03-17 RX ORDER — MAGNESIUM OXIDE 400 MG/1
400 TABLET ORAL EVERY 4 HOURS
Status: COMPLETED | OUTPATIENT
Start: 2023-03-17 | End: 2023-03-17

## 2023-03-17 RX ORDER — HYDRALAZINE HYDROCHLORIDE 25 MG/1
25 TABLET, FILM COATED ORAL 4 TIMES DAILY
Status: DISCONTINUED | OUTPATIENT
Start: 2023-03-17 | End: 2023-03-18

## 2023-03-17 RX ORDER — OXYCODONE HYDROCHLORIDE 5 MG/1
5 TABLET ORAL EVERY 4 HOURS PRN
Status: DISCONTINUED | OUTPATIENT
Start: 2023-03-17 | End: 2023-03-23

## 2023-03-17 RX ORDER — POTASSIUM CHLORIDE 750 MG/1
20 TABLET, EXTENDED RELEASE ORAL ONCE
Status: COMPLETED | OUTPATIENT
Start: 2023-03-17 | End: 2023-03-17

## 2023-03-17 RX ADMIN — OXYCODONE HYDROCHLORIDE 5 MG: 5 TABLET ORAL at 20:26

## 2023-03-17 RX ADMIN — HYDRALAZINE HYDROCHLORIDE 25 MG: 25 TABLET, FILM COATED ORAL at 20:26

## 2023-03-17 RX ADMIN — HYDRALAZINE HYDROCHLORIDE 25 MG: 25 TABLET, FILM COATED ORAL at 16:25

## 2023-03-17 RX ADMIN — MAGNESIUM OXIDE TAB 400 MG (241.3 MG ELEMENTAL MG) 400 MG: 400 (241.3 MG) TAB at 20:26

## 2023-03-17 RX ADMIN — OXYCODONE HYDROCHLORIDE 5 MG: 5 TABLET ORAL at 05:53

## 2023-03-17 RX ADMIN — POTASSIUM CHLORIDE 20 MEQ: 750 TABLET, EXTENDED RELEASE ORAL at 17:52

## 2023-03-17 RX ADMIN — ACETAMINOPHEN 975 MG: 325 TABLET ORAL at 10:20

## 2023-03-17 RX ADMIN — HYDRALAZINE HYDROCHLORIDE 25 MG: 25 TABLET, FILM COATED ORAL at 13:13

## 2023-03-17 RX ADMIN — OXYCODONE HYDROCHLORIDE 5 MG: 5 TABLET ORAL at 12:07

## 2023-03-17 RX ADMIN — MAGNESIUM OXIDE TAB 400 MG (241.3 MG ELEMENTAL MG) 400 MG: 400 (241.3 MG) TAB at 17:52

## 2023-03-17 RX ADMIN — OXYCODONE HYDROCHLORIDE 5 MG: 5 TABLET ORAL at 16:25

## 2023-03-17 RX ADMIN — LEVALBUTEROL HYDROCHLORIDE 1.25 MG: 1.25 SOLUTION RESPIRATORY (INHALATION) at 20:02

## 2023-03-17 RX ADMIN — SODIUM NITROPRUSSIDE 0.25 MCG/KG/MIN: 25 INJECTION, SOLUTION, CONCENTRATE INTRAVENOUS at 05:55

## 2023-03-17 RX ADMIN — LEVALBUTEROL HYDROCHLORIDE 1.25 MG: 1.25 SOLUTION RESPIRATORY (INHALATION) at 00:01

## 2023-03-17 RX ADMIN — SODIUM NITROPRUSSIDE 1.25 MCG/KG/MIN: 25 INJECTION, SOLUTION, CONCENTRATE INTRAVENOUS at 15:00

## 2023-03-17 RX ADMIN — LEVALBUTEROL HYDROCHLORIDE 1.25 MG: 1.25 SOLUTION RESPIRATORY (INHALATION) at 15:56

## 2023-03-17 ASSESSMENT — ACTIVITIES OF DAILY LIVING (ADL)
ADLS_ACUITY_SCORE: 22
ADLS_ACUITY_SCORE: 18
ADLS_ACUITY_SCORE: 22
ADLS_ACUITY_SCORE: 18
DEPENDENT_IADLS:: INDEPENDENT

## 2023-03-17 NOTE — PROGRESS NOTES
[facility]    Cardiology Progress Note- Cardiology      Date of Admission:  3/15/2023     Assessment & Plan: SL   Akshat Fragoso is a 57 year old man with past medical history of HFrEF (EF 10% 5/2022) 2/2 NICM s/p ICD, tobacco use disorder, marijuana use, HTN, lumbar radiculopathy, CKD who presents after being found down at home. Found to have had a prolonged episode of VT with concern for cardiac arrest s/p ROSC in the field.    Today:  -increase nipride  Gtt to goal   -hydralazine 25mg QID  -neuropsychiatry consult  -LVAD eval        # Ventricular tachycardia, out of hospital cardiac arrest  # Cardiogenic chock  # Acute on Chronic Systolic Heart failure (EF 10% 5/2022) 2/2 NICM ( Eosinophilic vs   # NYHA IIIb, AHA/ACC Class C  Presented with an episode of VT with poor cerebral perfusion and questionable consciousness/pulses Time down ~8 min presumably based on timing when son responded. Trigger likely end stage cardiomyopathy with additional provoking factor of significantly reduced sleep.   Admission weight 170 lb. Dry weight 165-170 lb. Potassium, magnesium, calcium within normal limits, lactic acid 1.3 and troponin 31->40 with low probability of new ischemic event. Low probability of new PE. Device revealed 5 min 43 seconds of VT at 199 bpm, with spontaneous conversion to sinus rhythm without defibrillation. Device was set to monitor at VT threshold of 170 without ATP therapy to be given.    Patient undergoing workup at [facility] for LVAD as destination therapy (initially evaluated for transplant though patient having trouble quitting marijuana/tobacco use). 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 Started workup with initial teeth extraction for caries.. Prior seen at [facility] for his heart failure though transferred his care to [facility].    He had runs of  VT while here, with hypotension. He was transferred to the ICU on 3/16/23 for  hemodynamic diuresis and further evaluation for advanced heart failure therapies. BNP elevated to 12k and renal function holding.      Recent studies:  - RHC RA 12, RV 52/6, PA 54/28/37, PCWP 24. CI 1.85 with PVR 3.6 LING. After nipride wedge well to 11, mPAP 22, PVR to 1.8 LING, CI to 3.1  - Coronary angiogram 2/2017 minimal nonobstructive coronary disease  - Echocardiogram 5/2022 EF 10%, LVIDd 7.9 cm, normal RV systolic function, TAPSE 2.4 cm  - CPX: Peak VO2: 13.3 ml/kg/min 3/8/21, with RER of 1.00   - VT secondary prevention while inpatient: amiodarone bolus 75 mg over 15 minutes with gtt at 1 mg/min. Held as it was not tolerated with hypotension  - Hold PTA digoxin 125 mcg  - Volume status:euvolemic  - BB: Hold PTA carvedilol 12.5 mg BID  - ACEi/ARB/ARNI: hold PTA lisinopril 5 mg BID  - afterload reduction: hydralazine 25mg Qid, Nipride gtt  - MRA:  PTA spironolactone 12.5 mg BID  - SGLT2i: none PTA  - SCD ppx/BiV pacer: ICD, reprogrammed device to provide ATP burst at VT threshold  - Continue PTA aspirin 81 mg daily  - Fluid restrict, daily I/O, daily weights, lytes checks   Started LVAD/Heart transplant eval on  -- CT head-chest-abdomen and pelvis:    -- Upper and lower ext arterial doppler    -- CT dental    -- US Carotid, left   -- US MISAEL bilateral   -- SW consult   -- Neuropsych consult   -- GI   -- Heme/onc   -- Palliative care consult   -- Cardiothoracic consult   -- Dental consult    -- Nutrition consult    -- PT/OT   -- Blood tests:   Blood test Result    PET    HLA typing    TSH    Uric Acid    A1C    Lupus A/C    PSA    Plasma Hb    Prealbumin     UDS      -- 6 min walk test: pending       # CKD  Likely in setting of cardiorenal pathology over time. Cr baseline   - Monitor     # Documented history of atrial fibrillation  Currently in sinus, unable to see in notes from [place] where this was found. Not on anticoagulation  - Telemetry here  - More records needed     # Lumbosacral radiculopathy  -  "Acetaminophen prn   - Takes norco at home, if severe pain here can use low dose oxycodone  -Pain medicine consult, declined injection     # Tobacco use disorder  Has cut back it appears to a few cigarettes daily, about 40 pack year history     # Marijuana use  Uses for anxiety    Diet: Fluid restriction 2000 ML FLUID  2 Gram Sodium Diet    DVT Prophylaxis: Pneumatic Compression Devices  Adkins Catheter: Not present  Cardiac Monitoring: ACTIVE order. Indication: Tachyarrhythmias, acute (48 hours)  Code Status: Full Code          Clinically Significant Risk Factors            # Hypomagnesemia: Lowest Mg = 1.6 mg/dL in last 2 days, will replace as needed              # Overweight: Estimated body mass index is 25.76 kg/m  as calculated from the following:    Height as of this encounter: 1.702 m (5' 7\").    Weight as of this encounter: 74.6 kg (164 lb 7.4 oz)., PRESENT ON ADMISSION         Disposition Plan   Expected discharge:4-7 days pending stabilization and evaluation of advanced cardiac therapies.     Entered: Dilip Hassan MD 03/17/2023, 7:31 AM   The patient's care was discussed with the Attending Physician, Dr. Hilton.      Dilip Hassan MD, PhD  Internal Medicine PGY2  [facility]  ______________________________________________________________________    Interval History   Admitted overnight. Says that he is no feeling well this am, mostly feels tired and chest pain after CPR yesterday when he was found. He says that he has had evaluation at [facility] for LVAD evaluation, but he was last seen on April 2022. Denies palpitations today.     Physical Exam   Vital Signs: Temp: 97.6  F (36.4  C) Temp src: Oral BP: 105/63 Pulse: 96   Resp: 16 SpO2: 96 % O2 Device: Nasal cannula with humidification Oxygen Delivery: 2 LPM  Weight: 164 lbs 7.41 oz  Temp: 97.6  F (36.4  C) Temp  Min: 97.6  F (36.4  C)  Max: 98.4  F (36.9  C)  Resp: 16 Resp  Min: 16  Max: 20  SpO2: 96 % SpO2  " Min: 90 %  Max: 99 %  Pulse: 96 Pulse  Min: 73  Max: 102    No data recorded  BP: 105/63 Systolic (24hrs), Av , Min:83 , Max:105   Diastolic (24hrs), Av, Min:58, Max:86      GEN: NAD, pleasant  HEENT: no icterus  CV: RRR, JVP ~6-8, trace bilateral LE edema  CHEST: CTAB  ABD: soft, NT/ND, NABS  NEURO: AA&Ox3, fluent/appropriate, motor grossly nonfocal  PSYCH: cooperative, affect appropriate    Medical Decision Making       Please see A&P for additional details of medical decision making.      Data     I have personally reviewed the following data over the past 24 hrs:    N/A  \   N/A   / N/A     135 (L) 93 (L) 33.7 (H) /  125 (H)   4.1 27 1.52 (H) \       ALT: 26 AST: 34 AP: 78 TBILI: 1.1   ALB: 3.9 TOT PROTEIN: 6.0 (L) LIPASE: N/A       Trop: N/A BNP: N/A       Imaging results reviewed over the past 24 hrs:   Recent Results (from the past 24 hour(s))   Echo Complete   Result Value    LVEF  5-10% (severely reduced)    Narrative    [id number]  [id number]  [id number]  [id number]^LUIS ANTONIO^JOJO^BETTY     [facility],[place]  Echocardiography Laboratory  [address] [place] [id number]     Name: CRISTOBAL THOMAS  MRN: [id number]  : 1965  Study Date: 2023 08:47 AM  Age: 57 yrs  Gender: Male  Patient Location: [facility]  Reason For Study: Cardiac Arrest  Ordering Physician: JOJO SALMON  Performed By: Sancho Rivera RDCS     BSA: 1.9 m2  Height: 67 in  Weight: 172 lb  HR: 78  BP: 97/60 mmHg  ______________________________________________________________________________  Procedure  Complete Portable Echo Adult. Contrast Optison. Optison (NDC #4368-4092-17)  given intravenously. Patient was given 6 ml mixture of 3 ml Optison and 6 ml  saline. 3 ml wasted.  ______________________________________________________________________________  Interpretation Summary  Severe left ventricular dilation (LVIDd 7.7cm).  Left ventricular function is severely reduced. The ejection fraction is  5-10%.  Global right ventricular function is normal.  Moderate tricuspid insufficiency.  Estimated pulmonary artery systolic pressure is 46 mmHg.  Dilation of the inferior vena cava is present with abnormal respiratory  variation in diameter.     This study was compared with the study from 11/20/19. LV is more dilated; LVEF  is lower.  ______________________________________________________________________________  Left Ventricle  Severe left ventricular dilation is present. Left ventricular function is  decreased. The ejection fraction is 5-10% (severely reduced). Left ventricular  diastolic function is not assessable. Severe diffuse hypokinesis is present.     Right Ventricle  Global right ventricular function is normal. Moderate right ventricular  dilation is present. A pacemaker lead is noted in the right ventricle.     Atria  Severe biatrial enlargement is present.     Mitral Valve  The mitral valve is normal.     Aortic Valve  The valve leaflets are not well visualized. On Doppler interrogation, there is  no significant stenosis or regurgitation.     Tricuspid Valve  Moderate tricuspid insufficiency is present. Estimated pulmonary artery  systolic pressure is 31 mmHg plus right atrial pressure. Mild pulmonary  hypertension is present.     Pulmonic Valve  The pulmonic valve cannot be assessed.     Vessels  Dilation of the inferior vena cava is present with abnormal respiratory  variation in diameter.     Pericardium  No pericardial effusion is present.     Compared to Previous Study  This study was compared with the study from 11/20/19 .  ______________________________________________________________________________  MMode/2D Measurements & Calculations     IVSd: 0.91 cm  LVIDd: 7.7 cm  LVIDs: 7.5 cm  LVPWd: 1.1 cm  FS: 2.6 %  LV mass(C)d: 393.5 grams  LV mass(C)dI: 207.5 grams/m2     EF(MOD-bp): 10.5 %  LA Volume (BP): 128.0 ml  LA Volume Index (BP): 67.4 ml/m2  RWT: 0.30  TAPSE: 2.3 cm     Doppler Measurements  & Calculations  TR max bong: 276.9 cm/sec  TR max P.7 mmHg  RV S Bong: 11.8 cm/sec     ______________________________________________________________________________  Report approved by: Joseph Ovalles 2023 11:21 AM         XR Chest Port 1 View    Narrative    EXAM: XR CHEST PORT 1 VIEW  3/16/2023 4:40 PM     HISTORY:  verify swan placement       COMPARISON:  3/15/2023    FINDINGS:   Stable left chest wall cardiac device with single lead projecting over  the right ventricle. Right IJ Baker-Jalen catheter with tip projecting  over the main pulmonary artery. The cardiac silhouette is enlarged.  The right costophrenic angle is partially collimated out of the  field-of-view. No pneumothorax. Unchanged diffuse interstitial  opacities. No pleural effusion.      Impression    IMPRESSION:   1. Right IJ Baker catheter with tip terminating over the main pulmonary  artery.  2. Unchanged cardiomegaly and findings of pulmonary edema.    I have personally reviewed the examination and initial interpretation  and I agree with the findings.    CAITLIN RAO MD         SYSTEM ID:  [id number]   XR Chest Port 1 View    Narrative    EXAM:  XR CHEST PORT 1 VIEW    INDICATION: Confirm swan position    COMPARISON:  Same day chest x-ray at 1628    FINDINGS:  Single AP view of the chest. Left upper chest cardiac device. Right IJ  Baker-Jalen catheter terminates over the main pulmonary artery.    Stable enlarged cardiac silhouette. Increased right basilar opacity  and diffuse interstitial opacities. No pneumothorax.  Left  costophrenic angle not completely imaged. Unremarkable upper abdomen.  No acute bony lesions.      Impression    IMPRESSION:  1.  Right IJ Baker-Jalen catheter terminates over the main pulmonary  artery.  2.  Cardiomegaly and interstitial opacities suggestive of pulmonary  edema.  3.  Increased right basilar opacity, likely atelectasis.    I have personally reviewed the examination and initial interpretation  and I  agree with the findings.    CAITLIN RAO MD         SYSTEM ID:  [id number]

## 2023-03-17 NOTE — PROGRESS NOTES
"Met with patient and family (wife- Landry, son-Ezekiel, daughter-Lala) to present the HM3 as a possible treatment option.     Discussed patient and caregiver responsibilities before and after VAD implant. Clarified that, r/t COVID-19 visitor restrictions, only 2 caregivers may be trained. Clarified the need for a caregiver to be present for education and to assist patient for at least first 30 days after a patient returns home. Patient's designated caregiver is wife (Cheryl),  and son (Ezekiel) wants to be considered as secondary caregiver.      Discussed basic overview of VAD equipment, on going management, need for anticoagulation, regular dressing change, grounded three-pronged outlet and functioning telephone. Also discussed frequency of follow-up clinic appointments and the need to stay locally for at least 2-4 weeks.  Reviewed restrictions of having a VAD such as no swimming, bathing, boats, MRI's, or arc welding.     Provided and discussed the VAD educational brochure, information regarding the VAD and transplant support groups, and \"VAD What You Should Know\" with additional details. Patient, wife and son signed \"VAD What You Should Know\" document (or agreed to have VAD Coordinator sign on their behalf). VAD coordinator contact information provided.  Encouraged patient and family to call with questions. Learners verbalized understanding of the above information.    Daughter Lala expressed feeling shock and overwhelm regarding information shared. Mentioned she would appreciate talking to a therapist. Shared this with social work in order for them to share available resources.         "

## 2023-03-17 NOTE — PLAN OF CARE
Major Shift Events:  Pt continues on NTP drip and titrated per order currently at 1mcg/kg/min. CO/CI improved as day progressed. Lasix drip discontinued but pt remains on fluid restriction as ordered. Wife and family updated by multiple teams for LVAD work-up. Pain in chest stated from chest compressions. Notified MD's who changed frequency of pain oxycodone order to q4hr to help keep pain under control.   Plan: Continue to monitor critical heart failure pt and notify MD's of any changes. Increase activity as tolerated. Monitor pain level and treat as ordered. For vital signs and complete assessments, please see documentation flowsheets.   Goal Outcome Evaluation:    Plan of Care Reviewed With: patient, spouse  Overall Patient Progress: improvingOverall Patient Progress: improving

## 2023-03-17 NOTE — CONSULTS
Dental Consult,  General Practice Residency     Patient:  Akshat Fragoso   Date of birth 1965, MRN: [id number]  Date of Visit:  03/17/2023  Date of Admission: 3/15/2023  Consult Requested by:Abbie Hilton, *                                          Assessment and Recommendations:   ASSESSMENT:  1. Akshat Fragoso is a 57 year old male with a history of HFrEF (EF 10% 5/2022) 2/2 NICM s/p ICD, tobacco use disorder, marijuana use, HTN, lumbar radiculopathy, CKD who presents after being found down at home. Found to have had a prolonged episode of VT with concern for cardiac arrest s/p ROSC in the field. Diagnosis upon admission Syncope and collapse [R55]. Pt was at his dentist 2 weeks ago for dental clearance. Necessary x rays taken and periodontal therapy completed ( SRP) and treatment planned for procedures at his dentist.   2. Ventricular tachycardia [I47.20].   3. Oral exam concerning for Large carious lesions on # 14 and #2. Initiated Endo treatment on #18 with his dentist.    4. Pt is very adamant that he doesn't want percussion exam on any of his teeth during oral evaluation. Wife and daughter were provided clinic information to transfer dental radiographs from his previous clinic.    RECOMMENDATION:  1. At this time, clinical and radiographic findings Akshat Fragoso may follow up with his existing dentist for continuing treatments as needed, the findings discussed in this note.   2. Pt can continue with LVAD procedure.  3. If acute pain or swelling arises during hospital stay, A second examination with further imaging and any definitive treatment will occur at the [facility] dental clinic in the [facility] building for Akshat Fragoso. The medical team is responsible for establishing transportation for the patient and sending any personnel they deem necessary to monitor the patient.    4. If this patient is unable to be safely transferred to the [facility] Dental clinic and urgent dental care should be pursued in an OR  setting. Please call clinic number and notify our scheduling team about the patient's needs and the medical team will be notified when an OR time has been secured.   ________________________________  Thank you for allowing us to participate in the care of this patient,  Direct any further questions to:     Alejandra Mejia MD DDS,  PGY1  General Practice Residency  Pager: [phone]    Patient discussed with:   Mando Marti DDS  , [facility]     Clinic information:   ([facility] bldg[facility]     [facility]  [address] [place] [address] [phone]                                             Reason for Consult:   Referring MD & Reason for Visit: I was asked by Abbie Hilton MD, to see Akshat Fragoso for a surgical clearance, cardio, dental consultation.                                               History of Present Illness:   a 57 year old male with a history of HFrEF (EF 10% 5/2022) 2/2 NICM s/p ICD, tobacco use disorder, marijuana use, HTN, lumbar radiculopathy, CKD who presents after being found down at home. Found to have had a prolonged episode of VT with concern for cardiac arrest s/p ROSC in the field. Diagnosis upon admission Syncope and collapse [R55]. Pt was at his dentist 2 weeks ago for dental clearance. Necessary x rays taken and periodontal therapy completed ( SRP) and treatment planned for procedures at his dentist. [facility] dental clinic info was provided to family member to transfer X rays from his dentist. Complaints of discomfort on upper left side, comes and goes.                                                   Clinical Examination     Vitals were reviewed  Temp: 98.4  F (36.9  C) Temp src: Oral BP: (!) 88/42 Pulse: 81   Resp: 18 SpO2: 93 % O2 Device: Nasal cannula with humidification Oxygen Delivery: 2 LPM      Extraoral exam:   No submandibular lymphadenopathy, no induration or erythema, no abnormal skin  lesions. Inferior border of mandible is palpable bilaterally. No TMJ discomfort bilaterally, DERICK >45mm. No clicking of TMJ on opening and lateral movements. Infraorbital region showed no swelling, no induration, no contusions, and no erythema of the bilaterally. Patient able to open and close both eyes without obstruction. Patient exhibits no obstructions during breathing.       Intraoral Findings   Lips ? Pink and dry   Mucosal quality ? watery and clear     Alveolar ridges  ? Ridges had no significant findings    Tongue ? Pink and Moist   Gingiva ? No significant findings   Cheeks ? Elastic/pliable, clear of debris   Hard palate  ? No significant findings   Floor of Mouth ? No abnormal soft tissue findings upon intraoral examination   Dentition ? Partially edentulous on both arches  ? Teeth #  2, 14 and 18 noted as clinical decay  ? Teeth #  18 noted as initiated endo  ? Generalized wear on posterior and anterior, Minor wear   Pulpal status  ? Teeth #  18 noted as non-vital  ? No vital test performed on #2 and #14.   Biofilm, General level ? mild   Denture(s) ? No removable prosthetic device(s) used   Care provided during assessment ? Oral Assessment and Patient education   Follow up assessments required ? If acute symptoms arise, follow up with [facility] dental clinic.  ? Otherwise, pt can continue procedures with his dental provider.                                                              Imaging     Dental CT taken on 03/16/2023  periapical findings on: #19-lower left 1st molar  Large carious lesion on #2-upper right 2nd molar and #14--upper left 1st molar      Past Medical History                                           Medical and surgical History     Past Medical History:   Diagnosis Date     Acute right lumbar radiculopathy 11/02/2015     Acute systolic heart failure (H) 01/10/2017     Cardiomyopathy (H) 01/10/2017     CKD (chronic kidney disease) stage 3, GFR 30-59 ml/min (H) 01/30/2020     JOHNSON (dyspnea  on exertion)      ED (erectile dysfunction) 10/02/2019     Erectile dysfunction      ICD (implantable cardioverter-defibrillator) in place- ENEFpro, single chamber- NOT dependent 10/09/2017     Personal history of smoking 01/04/2017     Pulmonary nodules 01/17/2017    CT 11/2018:  Bilateral pulmonary nodules measuring up to 4 mm. No new or enlarging pulmonary nodules.       Immunization History   Administered Date(s) Administered     COVID-19 Vaccine 18+ (Moderna) 03/18/2021, 04/15/2021, 12/10/2021, 05/20/2022     COVID-19 Vaccine Bivalent Booster 18+ (Moderna) 10/21/2022     Influenza Vaccine 50-64 or 18-64 w/egg allergy (Flublok) 10/15/2019     Pneumococcal 23 valent 01/30/2020     TDAP Vaccine (Adacel) 12/07/2011       Past Surgical History   Past Surgical History:   Procedure Laterality Date     CARDIAC SURGERY  2016    defibrillator placement     CV RIGHT HEART CATH MEASUREMENTS RECORDED N/A 11/20/2019    Procedure: CV RIGHT HEART CATH;  Surgeon: Jeff Aguilar MD;  Location:  HEART CARDIAC CATH LAB     None         Social History                                               Social and Family History     Family History   Problem Relation Age of Onset     Parkinsonism Mother      Atrial fibrillation Father      Heart Failure Father      Prostate Cancer Father      Skin Cancer Father      Anorexia nervosa Daughter      Other - See Comments Granddaughter         premature birth       Allergies                                                         Allergies     Allergies   Allergen Reactions     No Known Allergies        Medications                                                       Medications       Medications Prior to Admission   Medication Sig Dispense Refill Last Dose     digoxin (LANOXIN) 125 MCG tablet Take 125 mcg by mouth        albuterol (PROAIR HFA/PROVENTIL HFA/VENTOLIN HFA) 108 (90 Base) MCG/ACT inhaler INHALE 1 TO 2 PUFFS INTO THE LUNGS EVERY 4 HOURS AS NEEDED FOR SHORTNESS OF  BREATH OR DIFFICULT BREATHING OR WHEEZING 18 g 11      aspirin (ASA) 81 MG EC tablet Take 1 tablet by mouth daily        aspirin 81 MG tablet Take 81 mg by mouth daily        carvedilol (COREG) 12.5 MG tablet Take 1 tablet (12.5 mg) by mouth 2 times daily (with meals) Please make a follow up appointment for further refills. 180 tablet 1      furosemide (LASIX) 20 MG tablet Take 1 tablet (20 mg) by mouth 2 times daily (Only take if weight is up by 3 LBS) 180 tablet 1      HYDROcodone-acetaminophen (NORCO) 5-325 MG tablet Take 1 tablet by mouth every 6 hours as needed for pain 18 tablet 0      lisinopril (ZESTRIL) 5 MG tablet Take 1 tablet (5 mg) by mouth 2 times daily Please make a follow up appointment for further refills. 180 tablet 0      sildenafil (VIAGRA) 50 MG tablet TAKE 1 TABLET BY MOUTH ONCE DAILY AS NEEDED FOR ERECTILE DYSFUNCTION 20 tablet 0      spironolactone (ALDACTONE) 25 MG tablet Take 0.5 tablets (12.5 mg) by mouth daily 45 tablet 1          Current Facility-Administered Medications Ordered in Epic   Medication Dose Route Frequency Last Rate Last Admin     acetaminophen (TYLENOL) tablet 975 mg  975 mg Oral Q6H PRN   975 mg at 03/17/23 1020     [Held by provider] aspirin EC tablet 81 mg  81 mg Oral Daily   81 mg at 03/16/23 0800     [Held by provider] carvedilol (COREG) tablet 12.5 mg  12.5 mg Oral BID w/meals   12.5 mg at 03/16/23 0800     Continuing ACE inhibitor/ARB/ARNI from home medication list OR ACE inhibitor/ARB/ARNI order already placed during this visit   Does not apply DOES NOT GO TO MAR         Continuing beta blocker from home medication list OR beta blocker order already placed during this visit   Does not apply DOES NOT GO TO MAR         [Held by provider] digoxin (LANOXIN) tablet 125 mcg  125 mcg Oral Daily   125 mcg at 03/16/23 0800     [Held by provider] enoxaparin ANTICOAGULANT (LOVENOX) injection 40 mg  40 mg Subcutaneous Q24H         hydrALAZINE (APRESOLINE) tablet 25 mg  25 mg  Oral 4x Daily   25 mg at 03/17/23 1313     levalbuterol (XOPENEX) neb solution 1.25 mg  1.25 mg Nebulization Q2H PRN   1.25 mg at 03/17/23 0001     [Held by provider] lisinopril (ZESTRIL) tablet 5 mg  5 mg Oral BID   5 mg at 03/16/23 0800     naloxone (NARCAN) injection 0.2 mg  0.2 mg Intravenous Q2 Min PRN        Or     naloxone (NARCAN) injection 0.4 mg  0.4 mg Intravenous Q2 Min PRN        Or     naloxone (NARCAN) injection 0.2 mg  0.2 mg Intramuscular Q2 Min PRN        Or     naloxone (NARCAN) injection 0.4 mg  0.4 mg Intramuscular Q2 Min PRN         nitroPRUsside (NIPRIDE) 50 mg, sodium thiosulfate 500 mg in D5W 125 mL IV infusion (conc: 0.4 mg/mL)  0.25-5 mcg/kg/min (Dosing Weight) Intravenous Continuous 14 mL/hr at 03/17/23 1315 1.25 mcg/kg/min at 03/17/23 1315     oxyCODONE (ROXICODONE) tablet 5 mg  5 mg Oral Q6H PRN   5 mg at 03/17/23 1207     [Held by provider] spironolactone (ALDACTONE) half-tab 12.5 mg  12.5 mg Oral Daily   12.5 mg at 03/16/23 0800     No current Epic-ordered outpatient medications on file.

## 2023-03-17 NOTE — CONSULTS
Pain Service Consultation Note  [facility]      Patient Name: Akshat Fragoso  MRN: [id number]   Age: 57 year old  Sex: male  Date: March 17, 2023                                      Reviewed: No    Referring Service:  Cards 2  Reason for Consultation: Post chest compression pain    Assessment/Recommendations:  57 year old male with a history of cardiomyopathy with 10% EF. He had ventricular tachycardia and received chest compressions. He has 9/10 pain in his chest radiating through ribs b/l. Worse with moving around. I offered BRIANNA catheters, but the patient was very adamant that doesn't want any procedure with a needle poke and would rather suffer through it. He prefers medication management.    Plan:   - Call us back if the patient changes his mind and would like a nerve block.  - You can schedule Tylenol to ensure he gets it routinely in the hospital  - Ok to increase oxycodone as needed. The patient takes hydrocodone at home.    Thank you for the opportunity to participate in the care of Akshat Fragoso  Pain Service will sign off.    Discussed with attending anesthesiologist  Primary Service Contacted with Recommendations? Yes    Pastor Gao MD  3/17/2023      Chief Complaint:  Rib pain post chest compressions      History of Present Illness:  Akshat Fragoso is a 57 year old male with chest pain from rib fractures after CPR for Vtach arrest.  The pain is reported to be acute, located in the sternum and ribs, and radiates to lateral ribs.  Current pain is rated at 9/10. The patient is with chronic pain. The patient has an opioid tolerance, he takes 5 mg hydrocodone at home.    Past Medical History:  Past Medical History:   Diagnosis Date    Acute right lumbar radiculopathy 11/02/2015    Acute systolic heart failure (H) 01/10/2017    Cardiomyopathy (H) 01/10/2017    CKD (chronic kidney disease) stage 3, GFR 30-59 ml/min (H) 01/30/2020    JOHNSON (dyspnea on exertion)     ED  (erectile dysfunction) 10/02/2019    Erectile dysfunction     ICD (implantable cardioverter-defibrillator) in place- Celect, single chamber- NOT dependent 10/09/2017    Personal history of smoking 01/04/2017    Pulmonary nodules 01/17/2017    CT 11/2018:  Bilateral pulmonary nodules measuring up to 4 mm. No new or enlarging pulmonary nodules.         Family History:    Family History   Problem Relation Age of Onset    Parkinsonism Mother     Atrial fibrillation Father     Heart Failure Father     Prostate Cancer Father     Skin Cancer Father     Anorexia nervosa Daughter     Other - See Comments Granddaughter         premature birth       Social History:  Social History     Tobacco Use    Smoking status: Light Smoker     Packs/day: 0.25     Years: 15.00     Pack years: 3.75     Types: Cigarettes    Smokeless tobacco: Former     Quit date: 10/24/2011   Substance Use Topics    Alcohol use: No     Alcohol/week: 0.0 standard drinks         Review of Systems:  Complete ROS reviewed. Unless otherwise noted, all other systems found to be negative.        Laboratory Results:  Recent Labs   Lab Test 03/17/23  0351 03/15/23  2149 03/15/23  2003 05/07/19  1549 11/19/18  0746   INR  --   --  1.13  --  1.01   PLT  --   --  200   < > 234   PTT  --   --   --   --  29   BUN 33.7*   < > 27.6*   < > 24    < > = values in this interval not displayed.         Allergies:  Allergies   Allergen Reactions    No Known Allergies          Pain Medications:  Pain Medications/Prescriber: flip    Current Pain Related Medications:  Medications related to Pain Management (From now, onward)      Start     Dose/Rate Route Frequency Ordered Stop    03/16/23 1715  oxyCODONE (ROXICODONE) tablet 5 mg         5 mg Oral EVERY 6 HOURS PRN 03/16/23 1715      03/16/23 1715  acetaminophen (TYLENOL) tablet 975 mg         975 mg Oral EVERY 6 HOURS PRN 03/16/23 1715      03/16/23 0800  [Held by provider]  aspirin EC tablet 81 mg        (Held by  "provider since Thu 3/16/2023 at 1131 by Velez Reyes, German, MD.Hold Reason: Change in Vitals)   Note to Pharmacy: PTA Sig:Take 1 tablet by mouth daily      81 mg Oral DAILY 03/15/23 6539                Physical Exam:  Vitals: /63   Pulse 96   Temp 36.4  C (97.6  F) (Oral)   Resp 16   Ht 1.702 m (5' 7\")   Wt 74.6 kg (164 lb 7.4 oz)   SpO2 96%   BMI 25.76 kg/m      Physical Exam:     CONSTITUTIONAL/GENERAL APPEARANCE:  NAD  EYES: EOMI, sclera anicteric, PERRLA  ENT/NECK: atraumatic, lips and oral mucous membranes dry  RESPIRATORY: non-labored breathing. No cough, wheeze  CV: v-paced rhythm  MUSCULOSKELETAL/BACK/SPINE/EXTREMITIES: Moves all extremities purposefully.  No edema or obvious joint deformities   NEURO: Alert and Oriented x3. Answers questions appropriately  SKIN/VASCULAR EXAM:  No jaundice, no visible rashes or lesions          Acute Inpatient Pain Service [facility]  Hours of pain coverage 24/7   Page via Amcom- Please Page the Pain Team Via Amcom: \"PAIN MANAGEMENT ACUTE INPATIENT/ [facility]\"    Pastor Gao MD  Anesthesiology, PGY-3         "

## 2023-03-17 NOTE — PROGRESS NOTES
Major Shift Events:  Alert and oriented x4. Neuro intact. Complains of pain in ribs and chest area, prn meds given with some relief. CV: Sinus rhythm 80-90s. Afebrile. Systolics 90-110s, map goal 65-75, titrating nipride. CVP-12-16, CO-2.8, CI-1.5. PA catheter not returning blood, unable to draw ficks, cards aware and told to draw from CVP port. [place]idan now at 57. Resp: On 2L NC, sats high 90s. GI/: Voiding adequately per urinal. Oral intake adequate. Drips: Nipride 0.5 mcg/min, Lasix 2ml/hr  Plan: Continue plan of care, LVAD workup?   For vital signs and complete assessments, please see documentation flowsheets.

## 2023-03-17 NOTE — PROGRESS NOTES
Cardiology Cross Cover Note:    Called to bedside given malfunctioning swan. PA waveform is dampened and PA port not drawing back. Stat Xray showed that the swan was in an acceptable position. I manipulated the swan multiple times and was able to get a good PA waveform. The PA port flushes well but is still not drawing back. Unclear why. The swan was just placed this afternoon so thrombosis seems unlikely. Will hold off on tPA. We can draw our mixed venous off the CVP port for now understanding that it may give a slightly less accurate santy. We still have an accurate waveform so we can measure pressures.     Milan locked at 57 cm with the balloon deflated.     Updated: 4am hemodynamics showed CI of 1.2, SVR 2300. Continuing to make good urine. Blood pressure has improve with holding meds, will start very low dose nipride to see if we can get some afterload reduction to help with forward flow.     Ming Yoo MD  Cardiology Fellow

## 2023-03-17 NOTE — PROGRESS NOTES
Heart Transplant Referral  Date:  3/17/23    Type of Evaluation:  Hybrid (LVAD lead)  Lead Coordinator for Evaluation:  Willa Kevinrozina    Patient referred for Advanced Therapies by Dr. Hilton , as patient meets criteria for heart transplant evaluation.  Cardiac Diagnosis: NICM  Age: 57 year old  ABO: O   BMI: 25.7  COVID Vaccination Status: Vaccinated    Chart reviewed and the following barriers for transplant were noted in the chart:     Substance Use History: Tobacco and marijuana use  Other Known Barriers to Transplant: None identified at this time      Plan: LVAD will lead the evaluation given his current smoking and possible marijuana use. Transplant coordinator to follow and initiate transplant teaching and testing when appropriate.

## 2023-03-17 NOTE — CONSULTS
Patient of Dr. Shaun Aguiar seen in the hospital on patient care unit 4A for psychosocial assessment. 60 minutes spent with patient. 100% of visit consisted of counseling related to issues surrounding Heart Transplant and LVAD implant.    Psychosocial Assessment   Name: Akshat Fragoso     MRN:  [id number]        Patient Name / Age / Race: Akshat Fragoso 57 year old    Source of Information: Patient and Family   : Camille Melvin [facility]   Interview Date: March 17, 2023   Reason for Referral: Mechanical Circulatory Support     Current Living Situation   Location (City/State): [address] [place] [id number]   With Whom: Living arrangements - the patient lives with their family. Wife and 17 y/o Son-Ezekiel   Type of Home: Single family rambler style home with all needs met on the first level   Working Phone? Yes    [employer] Pr[employer] Outlet? Yes    Distance from Hospital: 15 minutes   Need to Relocate Post Surgery? No   Discussed? 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   Insurance: Private Insurance   Name of Private Insurance: are through [place] [employer] with a $7,500 deductible. On payment plan with [facility] and [employer] currently   Medication Coverage: Uses Good Rx and coupons/PAP programs when able, but has insurance with high deductible    In current situation, pt. can afford medication and supply costs:  Yes    Other Financial Concerns/Issues: May need to do some fundraising for assistance with medical bills/high cost of medications associated with transplant.     Family/Social Support   Marital Status:    Partner Name: Landry   Length of Time : Been together 38 years and  for 28 years   Partner s Employment: Works in [email or web address]  "[employer]-small business with no benefits. Does work remotely, however, and has a lot of flexibility with her job so she can be off for an extended period of time.   Relationship: stable/supportive   Children: 2 children: Dtr-Richa (age 24) and Son, Ezekiel (age 18). Dtr lives outside the home and Oneil lives at home.   Parents:    Siblings: 1 Sister-estranged. Has half-sister and 2 Step Brothers. Only close with 1 brother, but in [place].   Other Family or Friends Close by: Wife's Mom and Brother   Support System: available, helpful   Primary Support Person: Wife and Son   Issues: None     Activities/Functional Ability   Current Level: Was ambulatory and independent with ADL's prior to cardiac event. Although, reports very low energy and issues with SOB   Daily Activities: Reports he hasn't been able to \"do much\" for the last year. Spends most of his days watching TV. Is able to cook family meals and enjoys this   Transportation: self , but all members of family able to drive as well     Medical Status   Patient s understanding of need for surgical intervention: Good   Advanced Directive? No-Does not have one on file    Details: Provided blank copy and explanation/education on completion process.   Adherence History: Reports he is compliant with medications and most medical directives, but admits to having a more difficult time complying with not smoking   Ability to Adhere to Complex Medical Regime: Yes     Behavioral   Chemical Dependency Issues: No-Reports occasional THC use, 3-4 X/month (smokes it). No reported problems with ETOH or any other illicit substances.   Smoker? Yes -Reports 3-4 Cigarettes/week   Psychiatric impairment: Yes -Reports Depression off and on throughout his life and has engaged in psychotherapy at times. Denies taking medications. Wife reports that the whole family has anxiety. All of them are open to psychotherapy. Dtr was tearful during the PVE and writer's visit. Dtr verbalized high desire " to seek counseling for herself   Coping Style/Strategies: Described as pretty laid back and pretty happy most of the time. Does report use of THC to treat anxiety and stress. Enjoys cooking and being outdoors. Loves deer hunting and fishing with his Son   Recent Legal History: No   Teaching Completed During Assessment Related To Transplant and Mechanical Circulatory Support: 1. Housing and relocation needs post surgery.  2. Caregiver needs post surgery.  3. Financial issues related to surgery.  4. Risks of alcohol use post surgery.  5. Common psychosocial stressors pre/post surgery.  6. Support group availability.   Psychosocial Risks Reviewed Related To Transplant and Mechanical Circulatory Support: 1. Increased stress related to your emotional, family, social, employment, or financial situation.  2. Affect on work and/or disability benefits.  3. Affect on future health and life insurance.  4. Outcome expectations may not be met.  5. Mental Health risks: anxiety, depression, PTSD, guilt, grief and chronic fatigue.     Observed Behavior   Well informed? Yes  Angry? No   Process info well? Yes  Hostile? No   Evasive? No Oriented X3? Yes    Cautious/Suspicious? No Motivated? Yes    Appropriate Behavior? Yes  Depressed? No   Appropriate Affect? Yes  Anxious? No   Interview Observations: Good participation in conversation and good eye contact. Wife and kids present-very good support. All asked good questions. Seemed able to process information well     Selection Criteria Met   Plan for Support Yes    Chemical Dependence No-but yes VAD   Smoking No-but yes for VAD   Mental Health Yes -would benefit from health psych   Adequate Finances Yes      Risk Issues:    -History of smoking cigarettes, as well as THC.    Final Evaluation/Assessment:     Saw patient at the bedside on 4A with wife and 2 adult children (ages 18 and 24) present. Akshat will have adequate caregiver support post-VAD between his wife, who works from home,  "their 19 y/o son, Ezekiel, and possible assistance from 23 y/o Dtr and M-I-L. Everyone lives locally and able to drive, so lodging and transportation services are not needed.    Akshat and family admit to long history of anxiety and some depression, but doesn't appear to be problematic. Patient and family open to health psychology for assistance with adjustment to illness. Patient denies taking bill medications for mood. Family describes him as happy most of the time and \"laid back.\" Therefore, would benefit from psychotherapy while inpatient preparing for surgery. Will ask about resources for his Dtr as well.    Akshat reports ongoing smoking and usually smokes 3-4 cigarettes/week. He is also smoking THC 3-4 X/month. He is aware that he needs to abstain from this behavior to proceed with transplant listing and that if he were to need something now, it would be a VAD. Akshat admits to being told this by his medical team a while back, but hasn't been able to follow through. Other than cessation of smoking, no other reported issues of non-compliance.    He seems to have adequate insurance to proceed with VAD, but talks about having medical bills and wife has worked with financial counseling to make a payment plan. Wife reports ability to pay their bills, but Akshat has not worked enough in his lifetime or paid into social security. Therefore, patient will not be eligible for social security disability or Medicare. He will not be able to contribute to the household income.    No psychosocial barriers identified to prevent him from moving on with VAD right now. Would need to abstain from smoking cigarettes and THC to be a transplant candidate.    Frailty Score = 4     Patient understands risks and benefits of Heart Transplant and Mechanical Circulatory Support: Yes     Recommendation:    Acceptable for VAD only, Not a transplant candidate    Signature: Camille Melvin, [facility]     Title: Licensed Independent Clinical    "             Interview Date: March 17, 2023

## 2023-03-17 NOTE — TELEPHONE ENCOUNTER
[facility] Call Center    Phone Message    May a detailed message be left on voicemail: yes     Reason for Call: Other: [facility]   [phone] Pt 4[address] Bed 1 4th floor    Action Taken: Message routed to:  [facility] ([facility] Neuropsychology    Travel Screening: Not Applicable

## 2023-03-18 ENCOUNTER — APPOINTMENT (OUTPATIENT)
Dept: GENERAL RADIOLOGY | Facility: CLINIC | Age: 58
DRG: 001 | End: 2023-03-18
Attending: INTERNAL MEDICINE
Payer: COMMERCIAL

## 2023-03-18 ENCOUNTER — APPOINTMENT (OUTPATIENT)
Dept: ULTRASOUND IMAGING | Facility: CLINIC | Age: 58
DRG: 001 | End: 2023-03-18
Attending: STUDENT IN AN ORGANIZED HEALTH CARE EDUCATION/TRAINING PROGRAM
Payer: COMMERCIAL

## 2023-03-18 LAB
ABO/RH(D): NORMAL
ALBUMIN SERPL BCG-MCNC: 3.7 G/DL (ref 3.5–5.2)
ALP SERPL-CCNC: 74 U/L (ref 40–129)
ALT SERPL W P-5'-P-CCNC: 27 U/L (ref 10–50)
AMPHETAMINES UR QL SCN: ABNORMAL
ANION GAP SERPL CALCULATED.3IONS-SCNC: 10 MMOL/L (ref 7–15)
ANION GAP SERPL CALCULATED.3IONS-SCNC: 14 MMOL/L (ref 7–15)
ANION GAP SERPL CALCULATED.3IONS-SCNC: 14 MMOL/L (ref 7–15)
ANTIBODY SCREEN: NEGATIVE
APTT PPP: 28 SECONDS (ref 22–38)
AST SERPL W P-5'-P-CCNC: 43 U/L (ref 10–50)
BARBITURATES UR QL SCN: ABNORMAL
BASE EXCESS BLDV CALC-SCNC: 5 MMOL/L (ref -7.7–1.9)
BASE EXCESS BLDV CALC-SCNC: 6.2 MMOL/L (ref -7.7–1.9)
BASE EXCESS BLDV CALC-SCNC: 6.2 MMOL/L (ref -7.7–1.9)
BASE EXCESS BLDV CALC-SCNC: 9.8 MMOL/L (ref -7.7–1.9)
BASOPHILS # BLD AUTO: 0 10E3/UL (ref 0–0.2)
BASOPHILS NFR BLD AUTO: 0 %
BENZODIAZ UR QL SCN: ABNORMAL
BILIRUB SERPL-MCNC: 1.5 MG/DL
BUN SERPL-MCNC: 21.7 MG/DL (ref 6–20)
BUN SERPL-MCNC: 29.2 MG/DL (ref 6–20)
BUN SERPL-MCNC: 29.2 MG/DL (ref 6–20)
BURR CELLS BLD QL SMEAR: SLIGHT
BZE UR QL SCN: ABNORMAL
CALCIUM SERPL-MCNC: 8.2 MG/DL (ref 8.6–10)
CALCIUM SERPL-MCNC: 8.9 MG/DL (ref 8.6–10)
CALCIUM SERPL-MCNC: 8.9 MG/DL (ref 8.6–10)
CANNABINOIDS UR QL SCN: ABNORMAL
CHLORIDE SERPL-SCNC: 91 MMOL/L (ref 98–107)
CHLORIDE SERPL-SCNC: 91 MMOL/L (ref 98–107)
CHLORIDE SERPL-SCNC: 95 MMOL/L (ref 98–107)
CHOLEST SERPL-MCNC: 126 MG/DL
CREAT SERPL-MCNC: 0.79 MG/DL (ref 0.67–1.17)
CREAT SERPL-MCNC: 1.08 MG/DL (ref 0.67–1.17)
CREAT SERPL-MCNC: 1.08 MG/DL (ref 0.67–1.17)
DEPRECATED HCO3 PLAS-SCNC: 29 MMOL/L (ref 22–29)
EOSINOPHIL # BLD AUTO: 0.1 10E3/UL (ref 0–0.7)
EOSINOPHIL NFR BLD AUTO: 1 %
ERYTHROCYTE [DISTWIDTH] IN BLOOD BY AUTOMATED COUNT: 14.2 % (ref 10–15)
ETHANOL UR QL SCN: ABNORMAL
FERRITIN SERPL-MCNC: 162 NG/ML (ref 31–409)
GFR SERPL CREATININE-BSD FRML MDRD: 81 ML/MIN/1.73M2
GFR SERPL CREATININE-BSD FRML MDRD: 81 ML/MIN/1.73M2
GFR SERPL CREATININE-BSD FRML MDRD: >90 ML/MIN/1.73M2
GLUCOSE SERPL-MCNC: 137 MG/DL (ref 70–99)
GLUCOSE SERPL-MCNC: 137 MG/DL (ref 70–99)
GLUCOSE SERPL-MCNC: 158 MG/DL (ref 70–99)
HCO3 BLDV-SCNC: 31 MMOL/L (ref 21–28)
HCO3 BLDV-SCNC: 32 MMOL/L (ref 21–28)
HCO3 BLDV-SCNC: 32 MMOL/L (ref 21–28)
HCO3 BLDV-SCNC: 37 MMOL/L (ref 21–28)
HCT VFR BLD AUTO: 47 % (ref 40–53)
HDLC SERPL-MCNC: 33 MG/DL
HGB BLD-MCNC: 15.2 G/DL (ref 13.3–17.7)
HOLD SPECIMEN: NORMAL
IMM GRANULOCYTES # BLD: 0.1 10E3/UL
IMM GRANULOCYTES NFR BLD: 0 %
INR PPP: 1.18 (ref 0.85–1.15)
IRON BINDING CAPACITY (ROCHE): 330 UG/DL (ref 240–430)
IRON SATN MFR SERPL: 29 % (ref 15–46)
IRON SERPL-MCNC: 96 UG/DL (ref 61–157)
L PNEUMO1 AG UR QL IA: NEGATIVE
LDLC SERPL CALC-MCNC: 74 MG/DL
LYMPHOCYTES # BLD AUTO: 1.9 10E3/UL (ref 0.8–5.3)
LYMPHOCYTES NFR BLD AUTO: 13 %
MAGNESIUM SERPL-MCNC: 1.8 MG/DL (ref 1.7–2.3)
MAGNESIUM SERPL-MCNC: 2.1 MG/DL (ref 1.7–2.3)
MCH RBC QN AUTO: 30.2 PG (ref 26.5–33)
MCHC RBC AUTO-ENTMCNC: 32.3 G/DL (ref 31.5–36.5)
MCV RBC AUTO: 93 FL (ref 78–100)
MONOCYTES # BLD AUTO: 1.5 10E3/UL (ref 0–1.3)
MONOCYTES NFR BLD AUTO: 10 %
NEUTROPHILS # BLD AUTO: 11.6 10E3/UL (ref 1.6–8.3)
NEUTROPHILS NFR BLD AUTO: 76 %
NONHDLC SERPL-MCNC: 93 MG/DL
NRBC # BLD AUTO: 0 10E3/UL
NRBC BLD AUTO-RTO: 0 /100
NT-PROBNP SERPL-MCNC: ABNORMAL PG/ML (ref 0–900)
O2/TOTAL GAS SETTING VFR VENT: 2 %
O2/TOTAL GAS SETTING VFR VENT: 2 %
O2/TOTAL GAS SETTING VFR VENT: 30 %
O2/TOTAL GAS SETTING VFR VENT: 30 %
OPIATES UR QL SCN: ABNORMAL
OXYHGB MFR BLDV: 57 % (ref 70–75)
OXYHGB MFR BLDV: 72 % (ref 70–75)
OXYHGB MFR BLDV: 72 % (ref 70–75)
OXYHGB MFR BLDV: 80 % (ref 70–75)
PCO2 BLDV: 48 MM HG (ref 40–50)
PCO2 BLDV: 50 MM HG (ref 40–50)
PCO2 BLDV: 50 MM HG (ref 40–50)
PCO2 BLDV: 59 MM HG (ref 40–50)
PCP QUAL URINE (ROCHE): ABNORMAL
PH BLDV: 7.4 [PH] (ref 7.32–7.43)
PH BLDV: 7.41 [PH] (ref 7.32–7.43)
PH BLDV: 7.42 [PH] (ref 7.32–7.43)
PH BLDV: 7.43 [PH] (ref 7.32–7.43)
PHOSPHATE SERPL-MCNC: 2.9 MG/DL (ref 2.5–4.5)
PLAT MORPH BLD: ABNORMAL
PLATELET # BLD AUTO: 130 10E3/UL (ref 150–450)
PO2 BLDV: 33 MM HG (ref 25–47)
PO2 BLDV: 39 MM HG (ref 25–47)
PO2 BLDV: 42 MM HG (ref 25–47)
PO2 BLDV: 48 MM HG (ref 25–47)
POTASSIUM SERPL-SCNC: 3.9 MMOL/L (ref 3.4–5.3)
POTASSIUM SERPL-SCNC: 4.3 MMOL/L (ref 3.4–5.3)
POTASSIUM SERPL-SCNC: 4.3 MMOL/L (ref 3.4–5.3)
PROCALCITONIN SERPL IA-MCNC: 0.12 NG/ML
PROT SERPL-MCNC: 6.1 G/DL (ref 6.4–8.3)
RBC # BLD AUTO: 5.04 10E6/UL (ref 4.4–5.9)
RBC MORPH BLD: ABNORMAL
S PNEUM AG SPEC QL: NEGATIVE
SODIUM SERPL-SCNC: 134 MMOL/L (ref 136–145)
SPECIMEN EXPIRATION DATE: NORMAL
SPECIMEN EXPIRATION DATE: NORMAL
TRANSFERRIN SERPL-MCNC: 249 MG/DL (ref 200–360)
TRIGL SERPL-MCNC: 93 MG/DL
TSH SERPL DL<=0.005 MIU/L-ACNC: 1.3 UIU/ML (ref 0.3–4.2)
WBC # BLD AUTO: 15.2 10E3/UL (ref 4–11)

## 2023-03-18 PROCEDURE — 76700 US EXAM ABDOM COMPLETE: CPT | Mod: 26 | Performed by: RADIOLOGY

## 2023-03-18 PROCEDURE — 83735 ASSAY OF MAGNESIUM: CPT | Performed by: STUDENT IN AN ORGANIZED HEALTH CARE EDUCATION/TRAINING PROGRAM

## 2023-03-18 PROCEDURE — 71045 X-RAY EXAM CHEST 1 VIEW: CPT | Mod: 26 | Performed by: RADIOLOGY

## 2023-03-18 PROCEDURE — 200N000002 HC R&B ICU UMMC

## 2023-03-18 PROCEDURE — 80048 BASIC METABOLIC PNL TOTAL CA: CPT | Performed by: STUDENT IN AN ORGANIZED HEALTH CARE EDUCATION/TRAINING PROGRAM

## 2023-03-18 PROCEDURE — 999N000157 HC STATISTIC RCP TIME EA 10 MIN

## 2023-03-18 PROCEDURE — 87899 AGENT NOS ASSAY W/OPTIC: CPT

## 2023-03-18 PROCEDURE — 84443 ASSAY THYROID STIM HORMONE: CPT

## 2023-03-18 PROCEDURE — 84134 ASSAY OF PREALBUMIN: CPT

## 2023-03-18 PROCEDURE — 80307 DRUG TEST PRSMV CHEM ANLYZR: CPT

## 2023-03-18 PROCEDURE — 84100 ASSAY OF PHOSPHORUS: CPT

## 2023-03-18 PROCEDURE — 94640 AIRWAY INHALATION TREATMENT: CPT | Mod: 76

## 2023-03-18 PROCEDURE — 93925 LOWER EXTREMITY STUDY: CPT | Mod: 26 | Performed by: STUDENT IN AN ORGANIZED HEALTH CARE EDUCATION/TRAINING PROGRAM

## 2023-03-18 PROCEDURE — 82728 ASSAY OF FERRITIN: CPT

## 2023-03-18 PROCEDURE — 82805 BLOOD GASES W/O2 SATURATION: CPT | Performed by: INTERNAL MEDICINE

## 2023-03-18 PROCEDURE — 250N000009 HC RX 250: Performed by: STUDENT IN AN ORGANIZED HEALTH CARE EDUCATION/TRAINING PROGRAM

## 2023-03-18 PROCEDURE — 82805 BLOOD GASES W/O2 SATURATION: CPT

## 2023-03-18 PROCEDURE — 999N000065 XR CHEST PORT 1 VIEW

## 2023-03-18 PROCEDURE — 85610 PROTHROMBIN TIME: CPT

## 2023-03-18 PROCEDURE — 86901 BLOOD TYPING SEROLOGIC RH(D): CPT | Performed by: INTERNAL MEDICINE

## 2023-03-18 PROCEDURE — 85730 THROMBOPLASTIN TIME PARTIAL: CPT

## 2023-03-18 PROCEDURE — 85025 COMPLETE CBC W/AUTO DIFF WBC: CPT

## 2023-03-18 PROCEDURE — 87205 SMEAR GRAM STAIN: CPT

## 2023-03-18 PROCEDURE — 87070 CULTURE OTHR SPECIMN AEROBIC: CPT

## 2023-03-18 PROCEDURE — 83880 ASSAY OF NATRIURETIC PEPTIDE: CPT

## 2023-03-18 PROCEDURE — 84466 ASSAY OF TRANSFERRIN: CPT

## 2023-03-18 PROCEDURE — 94799 UNLISTED PULMONARY SVC/PX: CPT

## 2023-03-18 PROCEDURE — 250N000013 HC RX MED GY IP 250 OP 250 PS 637

## 2023-03-18 PROCEDURE — 99221 1ST HOSP IP/OBS SF/LOW 40: CPT | Mod: 25 | Performed by: INTERNAL MEDICINE

## 2023-03-18 PROCEDURE — 76700 US EXAM ABDOM COMPLETE: CPT

## 2023-03-18 PROCEDURE — 80061 LIPID PANEL: CPT

## 2023-03-18 PROCEDURE — 82805 BLOOD GASES W/O2 SATURATION: CPT | Performed by: STUDENT IN AN ORGANIZED HEALTH CARE EDUCATION/TRAINING PROGRAM

## 2023-03-18 PROCEDURE — 84145 PROCALCITONIN (PCT): CPT

## 2023-03-18 PROCEDURE — 93925 LOWER EXTREMITY STUDY: CPT

## 2023-03-18 PROCEDURE — 80053 COMPREHEN METABOLIC PANEL: CPT | Performed by: STUDENT IN AN ORGANIZED HEALTH CARE EDUCATION/TRAINING PROGRAM

## 2023-03-18 PROCEDURE — 99497 ADVNCD CARE PLAN 30 MIN: CPT | Performed by: INTERNAL MEDICINE

## 2023-03-18 PROCEDURE — 250N000011 HC RX IP 250 OP 636

## 2023-03-18 PROCEDURE — 250N000011 HC RX IP 250 OP 636: Performed by: INTERNAL MEDICINE

## 2023-03-18 PROCEDURE — 999N000045 HC STATISTIC DAILY SWAN MONITORING

## 2023-03-18 PROCEDURE — 83550 IRON BINDING TEST: CPT

## 2023-03-18 PROCEDURE — 250N000013 HC RX MED GY IP 250 OP 250 PS 637: Performed by: STUDENT IN AN ORGANIZED HEALTH CARE EDUCATION/TRAINING PROGRAM

## 2023-03-18 PROCEDURE — 80321 ALCOHOLS BIOMARKERS 1OR 2: CPT

## 2023-03-18 PROCEDURE — 99291 CRITICAL CARE FIRST HOUR: CPT | Mod: 25 | Performed by: INTERNAL MEDICINE

## 2023-03-18 PROCEDURE — 258N000003 HC RX IP 258 OP 636: Performed by: STUDENT IN AN ORGANIZED HEALTH CARE EDUCATION/TRAINING PROGRAM

## 2023-03-18 PROCEDURE — 85390 FIBRINOLYSINS SCREEN I&R: CPT | Mod: 26 | Performed by: PATHOLOGY

## 2023-03-18 RX ORDER — HYDRALAZINE HYDROCHLORIDE 25 MG/1
50 TABLET, FILM COATED ORAL 4 TIMES DAILY
Status: DISCONTINUED | OUTPATIENT
Start: 2023-03-18 | End: 2023-03-19

## 2023-03-18 RX ORDER — CEFTRIAXONE 2 G/1
2 INJECTION, POWDER, FOR SOLUTION INTRAMUSCULAR; INTRAVENOUS EVERY 24 HOURS
Status: COMPLETED | OUTPATIENT
Start: 2023-03-18 | End: 2023-03-22

## 2023-03-18 RX ORDER — AMOXICILLIN 250 MG
1 CAPSULE ORAL AT BEDTIME
Status: DISCONTINUED | OUTPATIENT
Start: 2023-03-18 | End: 2023-03-23

## 2023-03-18 RX ORDER — LIDOCAINE 4 G/G
2 PATCH TOPICAL
Status: DISCONTINUED | OUTPATIENT
Start: 2023-03-18 | End: 2023-03-23

## 2023-03-18 RX ORDER — FUROSEMIDE 10 MG/ML
80 INJECTION INTRAMUSCULAR; INTRAVENOUS ONCE
Status: COMPLETED | OUTPATIENT
Start: 2023-03-18 | End: 2023-03-18

## 2023-03-18 RX ORDER — FUROSEMIDE 10 MG/ML
80 INJECTION INTRAMUSCULAR; INTRAVENOUS ONCE
Status: COMPLETED | OUTPATIENT
Start: 2023-03-18 | End: 2023-03-19

## 2023-03-18 RX ORDER — DOXYCYCLINE 100 MG/10ML
100 INJECTION, POWDER, LYOPHILIZED, FOR SOLUTION INTRAVENOUS EVERY 12 HOURS
Status: DISCONTINUED | OUTPATIENT
Start: 2023-03-18 | End: 2023-03-19

## 2023-03-18 RX ORDER — MAGNESIUM SULFATE HEPTAHYDRATE 40 MG/ML
2 INJECTION, SOLUTION INTRAVENOUS ONCE
Status: COMPLETED | OUTPATIENT
Start: 2023-03-18 | End: 2023-03-18

## 2023-03-18 RX ORDER — POLYETHYLENE GLYCOL 3350 17 G/17G
17-34 POWDER, FOR SOLUTION ORAL 2 TIMES DAILY PRN
Status: DISCONTINUED | OUTPATIENT
Start: 2023-03-18 | End: 2023-03-23

## 2023-03-18 RX ADMIN — DOXYCYCLINE 100 MG: 100 INJECTION, POWDER, LYOPHILIZED, FOR SOLUTION INTRAVENOUS at 12:14

## 2023-03-18 RX ADMIN — HYDRALAZINE HYDROCHLORIDE 25 MG: 25 TABLET, FILM COATED ORAL at 08:29

## 2023-03-18 RX ADMIN — HYDRALAZINE HYDROCHLORIDE 50 MG: 25 TABLET, FILM COATED ORAL at 16:12

## 2023-03-18 RX ADMIN — OXYCODONE HYDROCHLORIDE 5 MG: 5 TABLET ORAL at 16:12

## 2023-03-18 RX ADMIN — SODIUM NITROPRUSSIDE 1.25 MCG/KG/MIN: 25 INJECTION, SOLUTION, CONCENTRATE INTRAVENOUS at 08:34

## 2023-03-18 RX ADMIN — HYDRALAZINE HYDROCHLORIDE 50 MG: 25 TABLET, FILM COATED ORAL at 20:21

## 2023-03-18 RX ADMIN — HYDRALAZINE HYDROCHLORIDE 50 MG: 25 TABLET, FILM COATED ORAL at 12:15

## 2023-03-18 RX ADMIN — MAGNESIUM SULFATE IN WATER 2 G: 40 INJECTION, SOLUTION INTRAVENOUS at 18:36

## 2023-03-18 RX ADMIN — FUROSEMIDE 80 MG: 10 INJECTION, SOLUTION INTRAVENOUS at 17:19

## 2023-03-18 RX ADMIN — LEVALBUTEROL HYDROCHLORIDE 1.25 MG: 1.25 SOLUTION RESPIRATORY (INHALATION) at 01:10

## 2023-03-18 RX ADMIN — OXYCODONE HYDROCHLORIDE 5 MG: 5 TABLET ORAL at 20:21

## 2023-03-18 RX ADMIN — OXYCODONE HYDROCHLORIDE 5 MG: 5 TABLET ORAL at 04:32

## 2023-03-18 RX ADMIN — OXYCODONE HYDROCHLORIDE 5 MG: 5 TABLET ORAL at 00:24

## 2023-03-18 RX ADMIN — SODIUM NITROPRUSSIDE 2 MCG/KG/MIN: 25 INJECTION, SOLUTION, CONCENTRATE INTRAVENOUS at 16:12

## 2023-03-18 RX ADMIN — LEVALBUTEROL HYDROCHLORIDE 1.25 MG: 1.25 SOLUTION RESPIRATORY (INHALATION) at 22:22

## 2023-03-18 RX ADMIN — LIDOCAINE PATCH 4% 2 PATCH: 40 PATCH TOPICAL at 11:01

## 2023-03-18 RX ADMIN — OXYCODONE HYDROCHLORIDE 5 MG: 5 TABLET ORAL at 08:29

## 2023-03-18 RX ADMIN — ACETAMINOPHEN 975 MG: 325 TABLET ORAL at 16:12

## 2023-03-18 RX ADMIN — CEFTRIAXONE SODIUM 2 G: 2 INJECTION, POWDER, FOR SOLUTION INTRAMUSCULAR; INTRAVENOUS at 11:06

## 2023-03-18 RX ADMIN — SENNOSIDES AND DOCUSATE SODIUM 1 TABLET: 50; 8.6 TABLET ORAL at 21:49

## 2023-03-18 RX ADMIN — OXYCODONE HYDROCHLORIDE 5 MG: 5 TABLET ORAL at 12:15

## 2023-03-18 RX ADMIN — SODIUM NITROPRUSSIDE 1.25 MCG/KG/MIN: 25 INJECTION, SOLUTION, CONCENTRATE INTRAVENOUS at 02:01

## 2023-03-18 RX ADMIN — DOXYCYCLINE 100 MG: 100 INJECTION, POWDER, LYOPHILIZED, FOR SOLUTION INTRAVENOUS at 21:50

## 2023-03-18 RX ADMIN — SODIUM NITROPRUSSIDE 2 MCG/KG/MIN: 25 INJECTION, SOLUTION, CONCENTRATE INTRAVENOUS at 21:49

## 2023-03-18 ASSESSMENT — ACTIVITIES OF DAILY LIVING (ADL)
ADLS_ACUITY_SCORE: 22

## 2023-03-18 NOTE — PLAN OF CARE
Major Shift Events:  Patient alert and oriented x4. Able to make needs known. Pain controled with PRN oxy q 4 hours. Sinus rhythm with rare PVC. CVP and PA improved. Nipride running at 1.25 to meet MAP goal between 65-75. 2L NC on for comfort. Patient's stats do decrease with movement and coughing. NPO since 0000 for ultrasound procedure today. Rt internal jugular with swan catheter in place.   Plan: Continue with testing for possible LVAD.   For vital signs and complete assessments, please see documentation flowsheets.

## 2023-03-18 NOTE — PROGRESS NOTES
Cardiology Cross Cover Note:    I was asked to perform hemodynamics overnight which showed the following:    CVP 4  PA 55/38  PCWP 26 (large V waves)  MvO2 57  Carlos CO 3.0  Carlos CI 1.6   MAP 79  SVR 2000    Will continue to hold diuretics and attempt to uptitrate nipride as tolerated.     [place] was locked at 58 cm with the balloon deflated.     Ming Yoo MD  Cardiology [place]

## 2023-03-18 NOTE — PROGRESS NOTES
[facility]    Cardiology Progress Note- Cardiology      Date of Admission:  3/15/2023     Assessment & Plan: SL   Akshat Fragoso is a 57 year old man with past medical history of HFrEF (EF 10% 5/2022) 2/2 NICM s/p ICD, tobacco use disorder, marijuana use, HTN, lumbar radiculopathy, CKD who presents after being found down at home. Found to have had a prolonged episode of VT with concern for cardiac arrest s/p ROSC in the field.    Today:  -increase hydralazine to 50mg Q6hrs  -ceftriaxone and doxycyline started  -sputum culture  -down titrate nipride as able  -continue work up for LVAD        # Ventricular tachycardia, out of hospital cardiac arrest  # Cardiogenic chock  # Acute on Chronic Systolic Heart failure (EF 10% 5/2022) 2/2 NICM ( Eosinophilic vs   # NYHA IIIb, AHA/ACC Class C  Presented with an episode of VT with poor cerebral perfusion and questionable consciousness/pulses Time down ~8 min presumably based on timing when son responded. Trigger likely end stage cardiomyopathy with additional provoking factor of significantly reduced sleep.  Device revealed 5 min 43 seconds of VT at 199 bpm, with spontaneous conversion to sinus rhythm without defibrillation. Device was set to monitor at VT threshold of 170 without ATP therapy to be given.    Patient start workup at [facility] for LVAD as destination therapy (initially evaluated for transplant though patient having trouble quitting marijuana/tobacco use). 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  He had runs of  VT while here, with hypotension on 3/16/18. Did not tolerate amiodarone gtt, due tho hypotension.      Recent studies:  - RHC RA 12, RV 52/6, PA 54/28/37, PCWP 24. CI 1.85 with PVR 3.6 LING. After nipride wedge well to 11, mPAP 22, PVR to 1.8 LING, CI to 3.1  - Coronary angiogram 2/2017 minimal nonobstructive coronary disease  - Echocardiogram 5/2022 EF 10%, LVIDd 7.9 cm, normal RV systolic  function, TAPSE 2.4 cm  - CPX: Peak VO2: 13.3 ml/kg/min 3/8/21, with RER of 1.00       DATE MAP CVP PAP PCWP Carlos CO Carlos CI SVR MVo2 Therapies   3/18 72 4 64/34 22 4.4 2.4  72 Nipride  1.15       - VT secondary prevention while inpatient: amiodarone bolus 75 mg over 15 minutes with gtt at 1 mg/min. Held as it was not tolerated with hypotension  - Hold PTA digoxin 125 mcg  - Volume status:euvolemic  - BB: Hold PTA carvedilol 12.5 mg BID  - ACEi/ARB/ARNI: hold PTA lisinopril 5 mg BID  - afterload reduction: hydralazine 25mg Qid, Nipride gtt  - MRA:  PTA spironolactone 12.5 mg BID  - SGLT2i: none PTA  - SCD ppx/BiV pacer: ICD, reprogrammed device to provide ATP burst at VT threshold  - Continue PTA aspirin 81 mg daily  - Fluid restrict, daily I/O, daily weights, lytes checks   Started LVAD/Heart transplant eval on  -- CT head-chest-abdomen and pelvis:    -- Upper and lower ext arterial doppler    -- CT dental    -- US Carotid, left   -- US MISAEL bilateral   -- SW consult   -- Neuropsych consult   -- GI   -- Heme/onc   -- Palliative care consult   -- Cardiothoracic consult   -- Dental consult    -- Nutrition consult    -- PT/OT   -- Blood tests:   Blood test Result    PET    HLA typing    TSH    Uric Acid    A1C    Lupus A/C    PSA    Plasma Hb    Prealbumin     UDS      -- 6 min walk test: pending     Leukocytosis  C/f CAP  Has increasing WBC, withc cough chest pain and purulent sputum production c/f CAP. Given elevated procal and the above symptoms will  Start abx    -ceftriaxone  2gm per day  -docycylcine  -sputum  Culture  -urine pneumo and legionella ag    # CKD  Likely in setting of cardiorenal pathology over time. Cr baseline   - Monitor     # Documented history of atrial fibrillation  Currently in sinus, unable to see in notes from [place] where this was found. Not on anticoagulation  - Telemetry here  - More records needed     # Lumbosacral radiculopathy  - Acetaminophen prn   - Takes norco at home, if severe pain  "here can use low dose oxycodone  -Pain medicine consult, declined injection     # Tobacco use disorder  Has cut back it appears to a few cigarettes daily, about 40 pack year history     # Marijuana use  Uses for anxiety    Diet: Fluid restriction 2000 ML FLUID  NPO per Anesthesia Guidelines for Procedure/Surgery Except for: Meds    DVT Prophylaxis: Pneumatic Compression Devices  Adkins Catheter: Not present  Cardiac Monitoring: ACTIVE order. Indication: Tachyarrhythmias, acute (48 hours)  Code Status: Full Code          Clinically Significant Risk Factors            # Hypomagnesemia: Lowest Mg = 1.6 mg/dL in last 2 days, will replace as needed              # Overweight: Estimated body mass index is 25.28 kg/m  as calculated from the following:    Height as of this encounter: 1.702 m (5' 7\").    Weight as of this encounter: 73.2 kg (161 lb 6 oz)., PRESENT ON ADMISSION         Disposition Plan   Expected discharge:4-7 days pending stabilization and evaluation of advanced cardiac therapies.     Entered: Dilip Hassan MD 03/18/2023, 7:32 AM   The patient's care was discussed with the Attending Physician, Dr. Hilton.      Dilip Hassan MD, PhD  Internal Medicine PGY2  [facility]  ______________________________________________________________________    Interval History   Admitted overnight. Says that he is no feeling well this am, mostly feels tired and chest pain after CPR yesterday when he was found. He says that he has had evaluation at [facility] for LVAD evaluation, but he was last seen on April 2022. Denies palpitations today.     Physical Exam   Vital Signs: Temp: 97.9  F (36.6  C) Temp src: Oral BP: (!) 87/56 Pulse: 86   Resp: 18 SpO2: 95 % O2 Device: Nasal cannula with humidification Oxygen Delivery: 2 LPM  Weight: 161 lbs 6.03 oz  Temp: 97.9  F (36.6  C) Temp  Min: 97.9  F (36.6  C)  Max: 98.4  F (36.9  C)  Resp: 18 Resp  Min: 18  Max: 20  SpO2: 95 % SpO2  Min: 88 % "  Max: 100 %  Pulse: 86 Pulse  Min: 72  Max: 119    No data recorded  BP: (!) 87/56 Systolic (24hrs), Av , Min:64 , Max:126   Diastolic (24hrs), Av, Min:42, Max:84      GEN: NAD, pleasant  HEENT: no icterus  CV: RRR, JVP ~6-8, trace bilateral LE edema  CHEST: CTAB  ABD: soft, NT/ND, NABS  NEURO: AA&Ox3, fluent/appropriate, motor grossly nonfocal  PSYCH: cooperative, affect appropriate    Medical Decision Making       Please see A&P for additional details of medical decision making.      Data     I have personally reviewed the following data over the past 24 hrs:    15.2 (H)  \   15.2   / 130 (L)     134 (L); 134 (L) 91 (L); 91 (L) 29.2 (H); 29.2 (H) /  137 (H); 137 (H)   4.3; 4.3 29; 29 1.08; 1.08 \       ALT: 27 AST: 43 AP: 74 TBILI: 1.5 (H)   ALB: 3.7 TOT PROTEIN: 6.1 (L) LIPASE: N/A       Trop: N/A BNP: 14,819 (H)       TSH: 1.30 T4: N/A A1C: N/A       INR:  1.18 (H) PTT:  28   D-dimer:  N/A Fibrinogen:  N/A       Ferritin:  162 % Retic:  N/A LDH:  N/A       Imaging results reviewed over the past 24 hrs:   No results found for this or any previous visit (from the past 24 hour(s)).

## 2023-03-18 NOTE — PLAN OF CARE
"D/I: Patient on unit 4A ICU for LVAD workup.  Neuro- WNL  CV-SR-ST () with LBBB. NTP @ 1.25 mcg/kg/mn to keep MAP 65-75.  Oral hydralazine increased today. No peripheral edema noted.  C/O of constant sternal/rib pain from CPR.    Pulm-Productive cough with creamy yellow thick sputum.  Sputum sent, antibiotics started.  Patient educated on use of IS and aerobika.  Able to get IS up to 750cc. Patient and wife aware he has a \"pneumonia.\"     GI-Abdomin soft. Eating 2gm/2 Na diet, 2L fluid restriction.  Appetite better today. Last BM 3/15.  Bowel meds ordered.     -Voiding small amts clear gretchen.     Gtts-NTP    Skin-Intact.   IV: RIJ  [place] Jalen at 57 cm.  NITA done at 0930.  Pain-c/o Constant sternal/rib pain from CPR.  Oxy 5mg po given q4 hrs. ICE pack an lidocaine patches placed on affected areas.    See flow sheets for further interventions and assessments.  A: Stable.  CXR, Abdominal U/S done. LVAD volunteer visited with patient and wife today.  Patient states he has not slept well since in the hospitial.  Wants to rest today.   P: Continue to monitor pt closely. Notify MD of significant changes.                         "

## 2023-03-18 NOTE — CONSULTS
"[facility]  Palliative Care Consultation Note    Patient: Akshat Fragoso  Date of Admission:  3/15/2023    Requesting Clinician / Team: cardiology service  Reason for consult: palliative LVAD preparedness planning    Recommendations:    Patient is in agreement with proceeding with LVAD without reservations      His main concern that came up repeatedly during our discussion was that he wants to make sure that he is able to still be \"himself\" -- meaning he does not want to be kept alive if his brain was injured to the point where he cant make decisions or interact with loved ones as he normally would and also wants to be able to eventually regain independence. Would not want long term nursing home level of care.       He and his wife are concerned about mental health support for their adult children as well as for themselves. The role of health psychology was already mentioned and recommended in the LAD SW note yesterday and so I went ahead - at the family's request- and placed health psychology consult.       Patient feeling overwhelmed and exhausted by so many interruptions during day and night. I asked nursing staff to try to bundle cares and set times during day when he will not be disturbed.       See answers to questions below for further details of our discussion      Palliative care team will sign off, please re-consult if further needs arise      LVAD preparedness plan template    Patient's legally designated health care agent: Wife Cheryl      Patient's description of how they make decisions (by themselves, in consultation with certain close loved ones, based on advice from medical professionals, etc):  With support from family and care team        Patient's thoughts about what constitutes a 'good' quality of life/ what makes life worth living: being independent, having full mental faculties, enjoying time with family, being outdoors, hunting      Patient's " "personal goals/hopes for receiving the LVAD and how they anticipate future with LVAD to be like: being able participate in good QOL activities listed above.       Patient's worries/concerns when considering receiving the LVAD: really worried about having a stroke and losing who he is/not being himself with a brain injury; also worried about losing independence with ADLs      Patient's thoughts about what health conditions would be unacceptable (situations the patient would want her/his doctors and loved ones to know that she/he would not want life prolonged)? Brain injury to point he is \"not himself\", not able to make care decisions and not able to interact with loved ones in meaningful way. Says he has talked with his wife about this and she knows what he means by this and knows what he would and would not find acceptable.       There are risks for kidney failure in patients with advancing heart disease who need LVAD support.  The places/locations that offer dialysis and accept patients with LVADs may be limited in your community.  What do you know about dialysis? Would you be open to doing dialysis and if so what quality of life concerns would you have?  Does not know much about it but says open learning if and figuring it out if that situation arises.       An LVAD carries a risk of stroke which, for some people, may limit their ability to communicate their wishes to others.  After a stroke, what sort of outcomes would be unacceptable to you (ie needing to live in nursing facility, loss of independence.. etc.) would be unacceptable if he lost his ability to think for himself and know who he was; also says it would be unacceptable to need nursing home level of care long term      The need to be on a ventilator/breathing machine through a tracheostomy (a tube in your neck) is a risk with LVAD. What are with circumstances you would want your medical providers and family to keep you alive with long term mechanical " ventilation? Short term tracheostomy would be acceptable as long as there was expectation he had chance of recovery over time      LVAD's are sometimes placed with the possibility of later pursuing heart transplant. Some of our patients are determined not to be heart transplant candidates, or choose to forego heart transplant surgery for personal reasons.  If you had an LVAD placed inside of you for the remainder of your life, what would be your concerns? He is not sure if he would want to puruse transplant of not. Wants to take one step at a time. Understands LVAD may be destination therapy and is accepting of this.       What circumstances or events would lead you to decide or ask your health care agent to decide that you would want the LVAD turned off and be allowed to die a natural death? Severe brain injury        These recommendations have been discussed with patient/wife and communicated with team via this note.      Thank you for the opportunity to participate in the care of this patient and family. Our team: does not plan on following further, however do not hesitate to call or re-consult if we can be of further assistance to the patient/family.     During regular M-F work hours -- if you are not sure who specifically to contact -- please contact us by sending a text page to our team consult pager at [phone].    After regular work hours and on weekends/holidays, you can call our answering service at [phone]. Also, who's on call for us is available in [facility] [employer].     Advance Care Planning Discussion 3/18/2023. ILeyda MD met with Patient and their spouse today at the hospital to discuss Advance Care Planning. Akshat Fragoso does have decisional capacity and was present for this discussion.  Those present were informed of the voluntary nature of this discussion and wished to proceed.  The discussion included: LVAD preparedness discussion as summarized above. This discussion  began at 1420 and ended at 1505 for a total of 45 minutes.     Total time spent was >60  minutes,  >50% of time was spent counseling and/or coordination of care regarding goals of care, sypmptom review, support for patient planning for LVAD next week.    Misty Gomez MD / Palliative Medicine / Text me via [employer].     Team Consult Pager [phone] (answered 8am-430pm M-F) - ok to text page via [employer] / After-Hours Answering Service [phone] / Palliative Clinic [facility] [place] [facility] [phone] (scheduling); [phone] (triage).      Assessments:  Akshat Fragoso is a 57 year old male with end stage heart failure preparing for LVAD next week.     Today, the patient was seen for:  Advance care planning discussion prior to LVAD    Prognosis, Goals, & Planning:      Functional Status just prior to hospitalization: 2 (Ambulatory and capable of all selfcare but unable to carry out any work activities; may need help with IADLs up and about > 50% of waking hours)      Prognosis, Goals, and/or Advance Care Planning were addressed today: Yes        Summary/Comments:       Patient's decision making preferences: shared with support from loved ones          Patient has decision-making capacity today for complex decisions: Yes            I have concerns about the patient/family's health literacy today: No           Patient has a completed Health Care Directive: No.       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 In past was offered LVAD and declined but per cardiology notes after further discussion this admission he is more open to considering it. Would prefer transplant if possible but is not a candidate given ongoing cigarrete and THC use. Cardiology service anticipates likely doing LVAD this admission.     Hx of anxiety and some depression, per LVAD SW note open to health psychology, not on medications    Received CPR (performed by son) at home prior to admission. Now has chest wall pain from this, using oxycodone prn. Has not had regular BMs, started on senna today    Key Palliative Symptom Data:  We are not helping to manage these symptoms currently in this patient.        ROS:  Comprehensive ROS is reviewed and is negative except as here & per HPI:      Past Medical History:  Past Medical History:   Diagnosis Date     Acute right lumbar radiculopathy 11/02/2015     Acute systolic heart failure (H) 01/10/2017     Cardiomyopathy (H) 01/10/2017     CKD (chronic kidney disease) stage 3, GFR 30-59 ml/min (H) 01/30/2020     JOHNSON (dyspnea on exertion)      ED (erectile dysfunction) 10/02/2019     Erectile dysfunction      ICD (implantable cardioverter-defibrillator) in place- [place], single chamber- NOT dependent 10/09/2017     Personal history of smoking 01/04/2017     Pulmonary nodules 01/17/2017    CT 11/2018:  Bilateral pulmonary nodules measuring up to 4 mm. No new or enlarging pulmonary nodules.        Past Surgical History:  Past Surgical History:   Procedure Laterality Date     CARDIAC SURGERY  2016    defibrillator placement     CV RIGHT HEART CATH MEASUREMENTS  RECORDED N/A 11/20/2019    Procedure: CV RIGHT HEART CATH;  Surgeon: Jeff Aguilar MD;  Location:  HEART CARDIAC CATH LAB     None           Family History:  Family History   Problem Relation Age of Onset     Parkinsonism Mother      Atrial fibrillation Father      Heart Failure Father      Prostate Cancer Father      Skin Cancer Father      Anorexia nervosa Daughter      Other - See Comments Granddaughter         premature birth         Allergies:  Allergies   Allergen Reactions     No Known Allergies         Medications:  I have reviewed this patient's medication profile and medications from this hospitalization.   Noted:  Oxycodone 5mg q 4 hours prn -using 5 x per day    Physical Exam:  Vital Signs: Temp: 98.2  F (36.8  C) Temp src: Axillary BP: (!) 79/55 Pulse: 105   Resp: 16 SpO2: 96 % O2 Device: Nasal cannula with humidification Oxygen Delivery: 2 LPM  Weight: 161 lbs 6.03 oz   Gen: sitting/lying in bed. Appears comfortable but fatigued  HEENT: NCAT. Conjunctiva clear. Sclera anicteric .  Resp: unlabored work of breathing, on 2L  Msk: no gross deformity, mild sarcopenia  Skin:  no jaundice  Ext: warm, well perfused.   Neuro: face symmetric. EOM, vision, hearing grossly intact. Speech fluent. Moves all extremities   Mental status/Psych: alert. Oriented. Asks/answers questions appropriately. Affect is flat/tired, but tearful at times      Data reviewed:  Recent imaging reviewed, my comments on pertinents:   Recent lab data reviewed, my comments on pertinents:

## 2023-03-18 NOTE — PROGRESS NOTES
"CLINICAL NUTRITION SERVICES - ASSESSMENT NOTE     Nutrition Prescription    RECOMMENDATIONS FOR MDs/PROVIDERS TO ORDER:  - BMI 25.28 kg/m^2 appropriate for VAD if future POC  - Consider supplementing Thiamine/B1 at dose of 100 mg/day with HF, EF <30%    Malnutrition Status:    Unable to assess at this time (not suspected per information available per chart review)    Recommendations already ordered by Registered Dietitian (RD):  - Encourage PO diet, high protein foods    Future/Additional Recommendations:  Monitor nutrition-related findings and follow pt per protocol  - Monitor for appropriate time to connect with pt for pre-VAD visit/education; not appro today per palliative provider (pt getting overwhelmed with multiple visitors, provider requests in-person visit at later date)     REASON FOR ASSESSMENT  Akshat Fragoso is a/an 57 year old male assessed by the dietitian for Provider Order - Heart failure pre-VAD planning    CLINICAL HISTORY  Hx of HFrEF (EF10%) 2/2 NICM s/p ICD, tobacco/marijuana use, HTN, lumbar radiculopathy, and CKD, admitted to [facility] after being found down at home with c/f cardiac arrest s/p ROSC in the field.      NUTRITION HISTORY  - Education for 2 gm Na diet completed this admit while on 6B unit  - Per education note:   \"Akshat reported that he has received some education on a low-sodium diet in the past. He tries to watch his sodium intake at home and seems familiar with amounts of sodium in the foods he typically eats (was able to report exact quantities in bread, turkey, coke, etc). In the past, he recalled being told to stay below 2300 mg sodium, so the recommendation of less than 2000 mg sodium was new to him. He stated that he tries to track his sodium and will now aim for less than 2 g Na. His wife and son are also familiar with his low sodium dietary needs and keep this in mind while grocery shopping. Akshat explained that he does not cook with salt or add any salt to his foods, and his " "family buys crackers/pretzels/etc. with no added salt. Despite receiving education years ago for CHF, Akshat was interested in the handouts and discussing tips for low-sodium diet. Writer further discussed other components of diet for heart failure, such as fluid restriction.\"     - Unable to obtain additional hx at this time. Pt with multiple provider visits today due to LVAD workup; becoming overwhelmed. Palliative provider who just finished an assessment requests for writer to see pt at later date, if possible.     CURRENT NUTRITION ORDERS  Diet: 2 g Sodium, 2 L fluid restriction   Intake/Tolerance: 100% intake x 2 meals noted     GI    No recent BMs documented     LABS:    Reviewed   Electrolytes  Potassium (mmol/L)   Date Value   03/18/2023 4.3   03/18/2023 4.3   03/17/2023 3.5   11/20/2019 4.5   05/07/2019 4.4   11/19/2018 4.0     Potassium POCT (mmol/L)   Date Value   03/15/2023 4.2     Phosphorus (mg/dL)   Date Value   03/18/2023 2.9   11/19/2018 3.8    Blood Glucose  Glucose (mg/dL)   Date Value   03/18/2023 137 (H)   03/18/2023 137 (H)   03/17/2023 131 (H)   03/17/2023 125 (H)   03/16/2023 163 (H)   11/20/2019 118 (H)   05/07/2019 132 (H)   11/19/2018 99   06/08/2017 103 (H)   01/12/2017 91     GLUCOSE BY METER POCT (mg/dL)   Date Value   03/15/2023 95     Glucose Whole Blood POCT (mg/dL)   Date Value   03/15/2023 94     Hemoglobin A1C (%)   Date Value   12/04/2018 5.5    Inflammatory Markers  CRP Inflammation (mg/L)   Date Value   11/19/2018 <2.9     WBC (10e9/L)   Date Value   11/20/2019 9.8   11/19/2018 11.0   06/08/2017 14.9 (H)     WBC Count (10e3/uL)   Date Value   03/18/2023 15.2 (H)   03/15/2023 12.2 (H)     Albumin (g/dL)   Date Value   03/18/2023 3.7   03/16/2023 3.9   03/15/2023 3.6   11/20/2019 3.6   11/19/2018 3.7   01/09/2017 3.5      Magnesium (mg/dL)   Date Value   03/18/2023 2.1   03/17/2023 2.0   03/17/2023 2.3   01/12/2017 2.1   01/11/2017 2.1   01/10/2017 2.1     Sodium (mmol/L)   Date " "Value   03/18/2023 134 (L)   03/18/2023 134 (L)   03/17/2023 137   11/20/2019 138   05/07/2019 136   11/19/2018 134    Renal  Urea Nitrogen (mg/dL)   Date Value   03/18/2023 29.2 (H)   03/18/2023 29.2 (H)   03/17/2023 32.4 (H)   11/20/2019 23   05/07/2019 19   11/19/2018 24     Creatinine (mg/dL)   Date Value   03/18/2023 1.08   03/18/2023 1.08   03/17/2023 1.30 (H)   11/20/2019 0.90   05/07/2019 0.94   11/19/2018 1.35 (H)     Additional  Triglycerides (mg/dL)   Date Value   03/18/2023 93   11/19/2018 282 (H)   01/10/2017 103     Ketones Urine (mg/dL)   Date Value   03/17/2023 Negative   01/10/2017 Negative        MEDICATIONS    Reviewed     SKIN    No concerns noted; WOCN not following      ANTHROPOMETRICS  Height: 170.2 cm (5' 7\")  Admit wt 78.3 kg (172 lbs) 3/16  Most Recent Weight: 73.2 kg (161 lb 6 oz)  IBW: 67.3 kg  109%IBW   BMI: Overweight BMI 25-29.9    Weight History:   - Weight down 5.1 kg since admit, but suspect is 2/2 fluid status given hx HF  - Variable trends; unclear EDW (possibly current?)  Wt Readings from Last 15 Encounters:   03/18/23 73.2 kg (161 lb 6 oz)   01/28/21 87.5 kg (193 lb)   07/30/20 77.1 kg (170 lb)   01/30/20 82.6 kg (182 lb)   11/20/19 77.1 kg (170 lb)   11/20/19 77.1 kg (170 lb)   10/15/19 79.8 kg (176 lb)   10/02/19 80.7 kg (178 lb)   09/25/19 77.6 kg (171 lb)   09/17/19 77.6 kg (171 lb)   08/23/19 72.6 kg (160 lb)   07/03/19 81.2 kg (179 lb)   05/07/19 83.3 kg (183 lb 11.2 oz)   04/27/19 80.3 kg (177 lb)   12/14/18 80.3 kg (177 lb)       Dosing Weight: 67 kg (IBW used d/t variable wt trends w/ HF)    ASSESSED NUTRITION NEEDS  Estimated Energy Needs: 8445-7750 kcals/day (25 - 30 kcals/kg)  Justification: Maintenance  Estimated Protein Needs:  grams protein/day (1.2-1.5 grams of pro/kg)  Justification: HF, lean body mass preservation (higher end of range post-op)  Estimated Fluid Needs:  (1 mL/kcal) up to 2L restriction   Justification: On a fluid restriction    PHYSICAL " FINDINGS  See malnutrition section below.  Unable to perform physical assessment 3/18     MALNUTRITION  % Intake: Unable to assess  % Weight Loss: Unable to assess  Subcutaneous Fat Loss: Unable to assess  Muscle Loss: Unable to assess  Fluid Accumulation/Edema: None noted  Malnutrition Diagnosis: Unable to determine due to incomplete assessment    NUTRITION DIAGNOSIS  Food- and nutrition-related knowledge deficit related to diet/nutrition recommendations if pursues LVAD as evidenced by RD referral during LVAD evaluation process.      INTERVENTIONS  Implementation  Nutrition Education: Not appropriate at this time due to patient condition   Collaboration with other providers - Bedside RN and palliative provider     Goals  Patient to consume % of nutritionally adequate meal trays TID, or the equivalent with supplements/snacks.     Monitoring/Evaluation  Progress toward goals will be monitored and evaluated per protocol.  Andrey Godfrey, PRESTON, LD, CNSC  Weekend/Holiday RD pager [phone]

## 2023-03-19 ENCOUNTER — APPOINTMENT (OUTPATIENT)
Dept: GENERAL RADIOLOGY | Facility: CLINIC | Age: 58
DRG: 001 | End: 2023-03-19
Attending: INTERNAL MEDICINE
Payer: COMMERCIAL

## 2023-03-19 LAB
ANION GAP SERPL CALCULATED.3IONS-SCNC: 12 MMOL/L (ref 7–15)
ANION GAP SERPL CALCULATED.3IONS-SCNC: 17 MMOL/L (ref 7–15)
BASE EXCESS BLDV CALC-SCNC: 10.2 MMOL/L (ref -7.7–1.9)
BASE EXCESS BLDV CALC-SCNC: 11.7 MMOL/L (ref -7.7–1.9)
BASE EXCESS BLDV CALC-SCNC: 2.9 MMOL/L (ref -7.7–1.9)
BUN SERPL-MCNC: 16.9 MG/DL (ref 6–20)
BUN SERPL-MCNC: 18.4 MG/DL (ref 6–20)
CA-I BLD-MCNC: 4.1 MG/DL (ref 4.4–5.2)
CA-I BLD-MCNC: 4.2 MG/DL (ref 4.4–5.2)
CALCIUM SERPL-MCNC: 7 MG/DL (ref 8.6–10)
CALCIUM SERPL-MCNC: 9 MG/DL (ref 8.6–10)
CHLORIDE SERPL-SCNC: 90 MMOL/L (ref 98–107)
CHLORIDE SERPL-SCNC: 99 MMOL/L (ref 98–107)
CREAT SERPL-MCNC: 0.68 MG/DL (ref 0.67–1.17)
CREAT SERPL-MCNC: 0.68 MG/DL (ref 0.67–1.17)
CREAT SERPL-MCNC: 0.89 MG/DL (ref 0.67–1.17)
DEPRECATED HCO3 PLAS-SCNC: 21 MMOL/L (ref 22–29)
DEPRECATED HCO3 PLAS-SCNC: 28 MMOL/L (ref 22–29)
ERYTHROCYTE [DISTWIDTH] IN BLOOD BY AUTOMATED COUNT: 14.6 % (ref 10–15)
ERYTHROCYTE [DISTWIDTH] IN BLOOD BY AUTOMATED COUNT: 14.6 % (ref 10–15)
GFR SERPL CREATININE-BSD FRML MDRD: >90 ML/MIN/1.73M2
GLUCOSE SERPL-MCNC: 168 MG/DL (ref 70–99)
GLUCOSE SERPL-MCNC: 96 MG/DL (ref 70–99)
HCO3 BLDV-SCNC: 27 MMOL/L (ref 21–28)
HCO3 BLDV-SCNC: 36 MMOL/L (ref 21–28)
HCO3 BLDV-SCNC: 38 MMOL/L (ref 21–28)
HCT VFR BLD AUTO: 45.9 % (ref 40–53)
HCT VFR BLD AUTO: 49.2 % (ref 40–53)
HGB BLD-MCNC: 14.6 G/DL (ref 13.3–17.7)
HGB BLD-MCNC: 15.8 G/DL (ref 13.3–17.7)
LACTATE SERPL-SCNC: 0.8 MMOL/L (ref 0.7–2)
LACTATE SERPL-SCNC: 1.8 MMOL/L (ref 0.7–2)
LACTATE SERPL-SCNC: 2.5 MMOL/L (ref 0.7–2)
MAGNESIUM SERPL-MCNC: 1.3 MG/DL (ref 1.7–2.3)
MAGNESIUM SERPL-MCNC: 2.6 MG/DL (ref 1.7–2.3)
MCH RBC QN AUTO: 30.1 PG (ref 26.5–33)
MCH RBC QN AUTO: 30.3 PG (ref 26.5–33)
MCHC RBC AUTO-ENTMCNC: 31.8 G/DL (ref 31.5–36.5)
MCHC RBC AUTO-ENTMCNC: 32.1 G/DL (ref 31.5–36.5)
MCV RBC AUTO: 94 FL (ref 78–100)
MCV RBC AUTO: 95 FL (ref 78–100)
MRSA DNA SPEC QL NAA+PROBE: NEGATIVE
O2/TOTAL GAS SETTING VFR VENT: 3 %
O2/TOTAL GAS SETTING VFR VENT: 30 %
O2/TOTAL GAS SETTING VFR VENT: 30 %
OXYHGB MFR BLDV: 66 % (ref 70–75)
OXYHGB MFR BLDV: 70 % (ref 70–75)
OXYHGB MFR BLDV: 72 % (ref 70–75)
PCO2 BLDV: 41 MM HG (ref 40–50)
PCO2 BLDV: 53 MM HG (ref 40–50)
PCO2 BLDV: 53 MM HG (ref 40–50)
PH BLDV: 7.43 [PH] (ref 7.32–7.43)
PH BLDV: 7.45 [PH] (ref 7.32–7.43)
PH BLDV: 7.47 [PH] (ref 7.32–7.43)
PLATELET # BLD AUTO: 122 10E3/UL (ref 150–450)
PLATELET # BLD AUTO: 122 10E3/UL (ref 150–450)
PLATELET # BLD AUTO: 142 10E3/UL (ref 150–450)
PO2 BLDV: 36 MM HG (ref 25–47)
PO2 BLDV: 39 MM HG (ref 25–47)
PO2 BLDV: 40 MM HG (ref 25–47)
POTASSIUM SERPL-SCNC: 2.8 MMOL/L (ref 3.4–5.3)
POTASSIUM SERPL-SCNC: 4.3 MMOL/L (ref 3.4–5.3)
POTASSIUM SERPL-SCNC: 4.3 MMOL/L (ref 3.4–5.3)
RBC # BLD AUTO: 4.82 10E6/UL (ref 4.4–5.9)
RBC # BLD AUTO: 5.25 10E6/UL (ref 4.4–5.9)
SA TARGET DNA: NEGATIVE
SODIUM SERPL-SCNC: 130 MMOL/L (ref 136–145)
SODIUM SERPL-SCNC: 137 MMOL/L (ref 136–145)
WBC # BLD AUTO: 15.6 10E3/UL (ref 4–11)
WBC # BLD AUTO: 16.4 10E3/UL (ref 4–11)

## 2023-03-19 PROCEDURE — 258N000003 HC RX IP 258 OP 636

## 2023-03-19 PROCEDURE — 250N000011 HC RX IP 250 OP 636: Performed by: INTERNAL MEDICINE

## 2023-03-19 PROCEDURE — 83735 ASSAY OF MAGNESIUM: CPT | Performed by: STUDENT IN AN ORGANIZED HEALTH CARE EDUCATION/TRAINING PROGRAM

## 2023-03-19 PROCEDURE — 71045 X-RAY EXAM CHEST 1 VIEW: CPT | Mod: 26 | Performed by: STUDENT IN AN ORGANIZED HEALTH CARE EDUCATION/TRAINING PROGRAM

## 2023-03-19 PROCEDURE — 80048 BASIC METABOLIC PNL TOTAL CA: CPT | Performed by: STUDENT IN AN ORGANIZED HEALTH CARE EDUCATION/TRAINING PROGRAM

## 2023-03-19 PROCEDURE — 85027 COMPLETE CBC AUTOMATED: CPT | Performed by: THORACIC SURGERY (CARDIOTHORACIC VASCULAR SURGERY)

## 2023-03-19 PROCEDURE — 250N000013 HC RX MED GY IP 250 OP 250 PS 637: Performed by: STUDENT IN AN ORGANIZED HEALTH CARE EDUCATION/TRAINING PROGRAM

## 2023-03-19 PROCEDURE — 200N000002 HC R&B ICU UMMC

## 2023-03-19 PROCEDURE — 99291 CRITICAL CARE FIRST HOUR: CPT | Mod: 25 | Performed by: INTERNAL MEDICINE

## 2023-03-19 PROCEDURE — 83605 ASSAY OF LACTIC ACID: CPT

## 2023-03-19 PROCEDURE — 82805 BLOOD GASES W/O2 SATURATION: CPT | Performed by: STUDENT IN AN ORGANIZED HEALTH CARE EDUCATION/TRAINING PROGRAM

## 2023-03-19 PROCEDURE — 250N000013 HC RX MED GY IP 250 OP 250 PS 637

## 2023-03-19 PROCEDURE — 999N000065 XR CHEST PORT 1 VIEW

## 2023-03-19 PROCEDURE — 250N000013 HC RX MED GY IP 250 OP 250 PS 637: Performed by: INTERNAL MEDICINE

## 2023-03-19 PROCEDURE — 82330 ASSAY OF CALCIUM: CPT

## 2023-03-19 PROCEDURE — 36415 COLL VENOUS BLD VENIPUNCTURE: CPT | Performed by: STUDENT IN AN ORGANIZED HEALTH CARE EDUCATION/TRAINING PROGRAM

## 2023-03-19 PROCEDURE — 85027 COMPLETE CBC AUTOMATED: CPT

## 2023-03-19 PROCEDURE — 36415 COLL VENOUS BLD VENIPUNCTURE: CPT | Performed by: INTERNAL MEDICINE

## 2023-03-19 PROCEDURE — 250N000011 HC RX IP 250 OP 636: Performed by: STUDENT IN AN ORGANIZED HEALTH CARE EDUCATION/TRAINING PROGRAM

## 2023-03-19 PROCEDURE — 36415 COLL VENOUS BLD VENIPUNCTURE: CPT

## 2023-03-19 PROCEDURE — 87040 BLOOD CULTURE FOR BACTERIA: CPT | Performed by: INTERNAL MEDICINE

## 2023-03-19 PROCEDURE — 250N000009 HC RX 250: Performed by: STUDENT IN AN ORGANIZED HEALTH CARE EDUCATION/TRAINING PROGRAM

## 2023-03-19 PROCEDURE — 250N000009 HC RX 250

## 2023-03-19 PROCEDURE — 87641 MR-STAPH DNA AMP PROBE: CPT

## 2023-03-19 PROCEDURE — 258N000003 HC RX IP 258 OP 636: Performed by: STUDENT IN AN ORGANIZED HEALTH CARE EDUCATION/TRAINING PROGRAM

## 2023-03-19 PROCEDURE — 250N000011 HC RX IP 250 OP 636

## 2023-03-19 RX ORDER — ONDANSETRON 4 MG/1
4 TABLET, ORALLY DISINTEGRATING ORAL EVERY 6 HOURS PRN
Status: DISCONTINUED | OUTPATIENT
Start: 2023-03-19 | End: 2023-03-23

## 2023-03-19 RX ORDER — CALCIUM GLUCONATE 20 MG/ML
1 INJECTION, SOLUTION INTRAVENOUS ONCE
Status: COMPLETED | OUTPATIENT
Start: 2023-03-19 | End: 2023-03-19

## 2023-03-19 RX ORDER — HYDRALAZINE HYDROCHLORIDE 25 MG/1
75 TABLET, FILM COATED ORAL 4 TIMES DAILY
Status: DISCONTINUED | OUTPATIENT
Start: 2023-03-19 | End: 2023-03-20

## 2023-03-19 RX ORDER — POTASSIUM CHLORIDE 1.5 G/1.58G
80 POWDER, FOR SOLUTION ORAL ONCE
Status: DISCONTINUED | OUTPATIENT
Start: 2023-03-19 | End: 2023-03-19

## 2023-03-19 RX ORDER — POTASSIUM CHLORIDE 29.8 MG/ML
20 INJECTION INTRAVENOUS
Status: COMPLETED | OUTPATIENT
Start: 2023-03-19 | End: 2023-03-19

## 2023-03-19 RX ORDER — POTASSIUM CHLORIDE 1.5 G/1.58G
40 POWDER, FOR SOLUTION ORAL ONCE
Status: COMPLETED | OUTPATIENT
Start: 2023-03-19 | End: 2023-03-19

## 2023-03-19 RX ORDER — MAGNESIUM SULFATE HEPTAHYDRATE 40 MG/ML
4 INJECTION, SOLUTION INTRAVENOUS ONCE
Status: COMPLETED | OUTPATIENT
Start: 2023-03-19 | End: 2023-03-19

## 2023-03-19 RX ORDER — MAGNESIUM SULFATE HEPTAHYDRATE 40 MG/ML
2 INJECTION, SOLUTION INTRAVENOUS ONCE
Status: DISCONTINUED | OUTPATIENT
Start: 2023-03-19 | End: 2023-03-19

## 2023-03-19 RX ORDER — MAGNESIUM OXIDE 400 MG/1
400 TABLET ORAL EVERY 4 HOURS
Status: DISCONTINUED | OUTPATIENT
Start: 2023-03-19 | End: 2023-03-19

## 2023-03-19 RX ORDER — DOXYCYCLINE 100 MG/1
100 CAPSULE ORAL EVERY 12 HOURS SCHEDULED
Status: COMPLETED | OUTPATIENT
Start: 2023-03-19 | End: 2023-03-22

## 2023-03-19 RX ADMIN — POTASSIUM CHLORIDE 20 MEQ: 29.8 INJECTION, SOLUTION INTRAVENOUS at 08:16

## 2023-03-19 RX ADMIN — CEFTRIAXONE SODIUM 2 G: 2 INJECTION, POWDER, FOR SOLUTION INTRAMUSCULAR; INTRAVENOUS at 09:47

## 2023-03-19 RX ADMIN — ACETAMINOPHEN 975 MG: 325 TABLET ORAL at 05:52

## 2023-03-19 RX ADMIN — POTASSIUM CHLORIDE 20 MEQ: 29.8 INJECTION, SOLUTION INTRAVENOUS at 11:12

## 2023-03-19 RX ADMIN — OXYCODONE HYDROCHLORIDE 5 MG: 5 TABLET ORAL at 21:56

## 2023-03-19 RX ADMIN — POLYETHYLENE GLYCOL 3350 17 G: 17 POWDER, FOR SOLUTION ORAL at 08:15

## 2023-03-19 RX ADMIN — HYDRALAZINE HYDROCHLORIDE 75 MG: 25 TABLET, FILM COATED ORAL at 12:55

## 2023-03-19 RX ADMIN — DIGOXIN 125 MCG: 125 TABLET ORAL at 08:14

## 2023-03-19 RX ADMIN — MAGNESIUM SULFATE IN WATER 4 G: 40 INJECTION, SOLUTION INTRAVENOUS at 09:18

## 2023-03-19 RX ADMIN — OXYCODONE HYDROCHLORIDE 5 MG: 5 TABLET ORAL at 14:17

## 2023-03-19 RX ADMIN — POTASSIUM CHLORIDE 40 MEQ: 1.5 POWDER, FOR SOLUTION ORAL at 08:24

## 2023-03-19 RX ADMIN — HYDRALAZINE HYDROCHLORIDE 75 MG: 25 TABLET, FILM COATED ORAL at 19:52

## 2023-03-19 RX ADMIN — HYDRALAZINE HYDROCHLORIDE 50 MG: 25 TABLET, FILM COATED ORAL at 08:13

## 2023-03-19 RX ADMIN — OXYCODONE HYDROCHLORIDE 5 MG: 5 TABLET ORAL at 18:06

## 2023-03-19 RX ADMIN — DOXYCYCLINE 100 MG: 100 INJECTION, POWDER, LYOPHILIZED, FOR SOLUTION INTRAVENOUS at 11:12

## 2023-03-19 RX ADMIN — CALCIUM GLUCONATE 1 G: 20 INJECTION, SOLUTION INTRAVENOUS at 12:21

## 2023-03-19 RX ADMIN — POTASSIUM CHLORIDE 20 MEQ: 29.8 INJECTION, SOLUTION INTRAVENOUS at 06:30

## 2023-03-19 RX ADMIN — HYDRALAZINE HYDROCHLORIDE 75 MG: 25 TABLET, FILM COATED ORAL at 16:01

## 2023-03-19 RX ADMIN — OXYCODONE HYDROCHLORIDE 5 MG: 5 TABLET ORAL at 00:04

## 2023-03-19 RX ADMIN — OXYCODONE HYDROCHLORIDE 5 MG: 5 TABLET ORAL at 05:52

## 2023-03-19 RX ADMIN — SODIUM NITROPRUSSIDE 1.5 MCG/KG/MIN: 25 INJECTION, SOLUTION, CONCENTRATE INTRAVENOUS at 12:56

## 2023-03-19 RX ADMIN — SODIUM NITROPRUSSIDE 1.5 MCG/KG/MIN: 25 INJECTION, SOLUTION, CONCENTRATE INTRAVENOUS at 04:11

## 2023-03-19 RX ADMIN — DOXYCYCLINE HYCLATE 100 MG: 100 CAPSULE ORAL at 19:52

## 2023-03-19 RX ADMIN — FUROSEMIDE 80 MG: 10 INJECTION, SOLUTION INTRAVENOUS at 00:03

## 2023-03-19 RX ADMIN — ACETAMINOPHEN 975 MG: 325 TABLET ORAL at 19:55

## 2023-03-19 RX ADMIN — OXYCODONE HYDROCHLORIDE 5 MG: 5 TABLET ORAL at 09:47

## 2023-03-19 ASSESSMENT — ACTIVITIES OF DAILY LIVING (ADL)
ADLS_ACUITY_SCORE: 22

## 2023-03-19 NOTE — PROGRESS NOTES
[facility]    Cardiology Progress Note- Cardiology      Date of Admission:  3/15/2023     Assessment & Plan: SL   Akshat Fragoso is a 57 year old man with past medical history of HFrEF (EF 10% 5/2022) 2/2 NICM s/p ICD, tobacco use disorder, marijuana use, HTN, lumbar radiculopathy, CKD who presents after being found down at home. Found to have had a prolonged episode of VT with concern for cardiac arrest s/p ROSC in the field.    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 He had  A run of  VT while here, with hypotension on 3/16/18. Did not tolerate amiodarone gtt, due to hypotension.  Has been in sinus since then.  Has been managed here with diuresis and afterload reducing agents with hemodynamic monitoring. Patient started workup at [facility] for LVAD as DT (initially evaluated for transplant though patient having trouble quitting marijuana/tobacco use). Now has started work up for LVAD  Here.     Recent studies:  - RHC RA 12, RV 52/6, PA 54/28/37, PCWP 24. CI 1.85 with PVR 3.6 LING. After nipride wedge well to 11, mPAP 22, PVR to 1.8 LING, CI to 3.1  - Coronary angiogram 2/2017 minimal nonobstructive coronary disease  - Echocardiogram 5/2022 EF 10%, LVIDd 7.9 cm, normal RV systolic function, TAPSE 2.4 cm  - CPX: Peak  VO2: 13.3 ml/kg/min 3/8/21, with RER of 1.00       DATE MAP CVP PAP PCWP Carlos CO Carlos CI SVR MVo2 Therapies   3/18 72 4 64/34 22 4.4 2.4  72 Nipride  1.15   3/19 77 8 58/34 26 4.6 2.5 1200 72 Nipride, hydralazine       - VT secondary prevention while inpatient: amiodarone bolus 75 mg over 15 minutes with gtt at 1 mg/min. Held as it was not tolerated with hypotension  - Continue PTA digoxin 125 mcg  - Volume status:euvolemic  - BB: Hold PTA carvedilol 12.5 mg BID  - ACEi/ARB/ARNI: hold PTA lisinopril 5 mg BID  - afterload reduction: hydralazine 75mg Qid, Nipride gtt wean off of it as able  - MRA:  PTA spironolactone 12.5 mg BID  - SGLT2i: none PTA  - SCD ppx/BiV pacer: ICD, reprogrammed device to provide ATP burst at VT threshold  - discontinued PTA aspirin as there is no clear indication  - Fluid restrict, daily I/O, daily weights, lytes checks   Started LVAD/Heart transplant eval   -- CT head- normal   -- CT chest-abdomen and pelvis:  Basilar atelectasis with faint GGO. Colonic diverticulosis without diverticulitis  -- Upper E xtremity doppler: pending   -- Lower ext arterial doppler: Normal     -- CT dental: done periodontal disease, NO evidence of cellulitis or abscess  -- US Carotid, left   -- US MISAEL bilateral : Pending   -- SW consult: pending  -- Neuropsych consult:L pending  -- GI Pending  -- Heme/onc: pending   -- Palliative care consult, recs appreciated  -- Cardiothoracic consult: pending    -- Dental consult, recs appreciated  -- Nutrition consult : pending  -- PT/OT   -- Blood tests:     -- 6 min walk test: pending     Leukocytosis  C/f CAP  Has increasing WBC, withc cough chest pain and purulent sputum production and GGO on chest CT is  c/f CAP. Given elevated procal and the above symptoms will  Start abx    -ceftriaxone  2gm per day  (3/18-*)  -doxycycline (3/18-*)      # CKD  Likely in setting of cardiorenal pathology over time. Cr baseline   - Monitor  Hypokalemia: replace  Hypomagnesemia :  "replace  Hypocalcemia: replace     # Documented history of atrial fibrillation  Currently in sinus, unable to see in notes from [facility] where this was found. Not on anticoagulation  - Telemetry here  - More records needed     # Lumbosacral radiculopathy  - Acetaminophen prn   - Takes norco at home, if severe pain here can use low dose oxycodone  -Pain medicine consult, declined injection     # Tobacco use disorder  Has cut back it appears to a few cigarettes daily, about 40 pack year history     # Marijuana use  Uses for anxiety    Diet: 2 Gram Sodium Diet  Fluid restriction 2000 ML FLUID    DVT Prophylaxis: Pneumatic Compression Devices  Adkins Catheter: Not present  Cardiac Monitoring: ACTIVE order. Indication: Tachyarrhythmias, acute (48 hours)  Code Status: Full Code          Clinically Significant Risk Factors        # Hypokalemia: Lowest K = 2.8 mmol/L in last 2 days, will replace as needed   # Hypocalcemia: Lowest Ca = 7 mg/dL in last 2 days, will monitor and replace as appropriate       # Thrombocytopenia: Lowest platelets = 122 in last 2 days, will monitor for bleeding          # Overweight: Estimated body mass index is 25.07 kg/m  as calculated from the following:    Height as of this encounter: 1.702 m (5' 7\").    Weight as of this encounter: 72.6 kg (160 lb 0.9 oz)., PRESENT ON ADMISSION         Disposition Plan   Expected discharge:4-7 days pending stabilization and evaluation of advanced cardiac therapies.     Entered: Dilip Hassan MD 03/19/2023, 6:49 AM   The patient's care was discussed with the Attending Physician, Dr. Hilton.      Dilip Hassan MD, PhD  Internal Medicine PGY2  [facility]  ______________________________________________________________________    Interval History   Admitted overnight. Says that he is no feeling well this am, mostly feels tired and chest pain after CPR yesterday when he was found. He says that he has had evaluation at [facility] for LVAD evaluation, but he was last seen on 2022. Denies palpitations today.     Physical Exam   Vital Signs: Temp: 98.4  F (36.9  C) Temp src: Axillary BP: (!) 105/99 Pulse: (!) 124   Resp: 12 SpO2: 95 % O2 Device: Nasal cannula Oxygen Delivery: 2 LPM  Weight: 160 lbs .86 oz  Temp: 98.4  F (36.9  C) Temp  Min: 97.9  F (36.6  C)  Max: 98.4  F (36.9  C)  Resp: 12 Resp  Min: 12  Max: 16  SpO2: 95 % SpO2  Min: 86 %  Max: 98 %  Pulse: (!) 124 Pulse  Min: 83  Max: 126    No data recorded  BP: (!) 105/99 Systolic (24hrs), Av , Min:77 , Max:129   Diastolic (24hrs), Av, Min:41, Max:99      GEN: NAD, pleasant  HEENT: no icterus  CV: RRR, JVP ~7cm, trace bilateral LE edema  CHEST: CTAB  ABD: soft, NT/ND, NABS  NEURO: AA&Ox3, fluent/appropriate, motor grossly nonfocal  PSYCH: cooperative, affect appropriate    Medical Decision Making       Please see A&P for additional details of medical decision making.      Data     I have personally reviewed the following data over the past 24 hrs:    N/A  \   N/A   / 122 (L)     137 99 16.9 /  96   2.8 (L) 21 (L) 0.68; 0.68 \       Procal: 0.12 (H) CRP: N/A Lactic Acid: N/A         Imaging results reviewed over the past 24 hrs:   Recent Results (from the past 24 hour(s))   XR Chest Port 1 View    Narrative    Exam: XR CHEST PORT 1 VIEW, 3/18/2023 9:21 AM    Comparison: CT 3/17/2023, radiograph 3/16/2023    History: swan position check    Findings:  Portable AP view of the chest. Right IJ Orange-Jalen catheter tip  projects over the right main pulmonary artery. Left chest wall cardiac  device with lead in stable position. Stable enlarged cardiac  silhouette. Persistent streaky interstitial opacities. Mild bibasilar  atelectasis. No appreciable pneumothorax. No pleural effusion. No  acute osseous abnormality.      Impression    Impression:   1. Right IJ Orange-Jalen catheter tip projects over the right main  pulmonary artery.  2. Cardiomegaly with persistent interstitial  opacities, suggestive of  pulmonary edema.    I have personally reviewed the examination and initial interpretation  and I agree with the findings.    NICOL FAJARDO MD         SYSTEM ID:  [id number]   US Abdomen Complete Portable    Narrative    EXAMINATION: US ABDOMEN COMPLETE, 3/18/2023 9:47 AM     COMPARISON: CT 3/17/2023    HISTORY: VAD workup    FINDINGS:   Fluid: No evidence of ascites or pleural effusions.    Liver: The liver demonstrates normal echotexture, measuring 16.7 cm in  craniocaudal dimension. There is no focal mass.     Gallbladder: There is no wall thickening, pericholecystic fluid,  positive sonographic Carty's sign or evidence for cholelithiasis.    Bile Ducts: Both the intra- and extrahepatic biliary system are of  normal caliber.  The common bile duct measures 3.5 mm in diameter.    Pancreas: Visualized portions of the head and body of the pancreas are  unremarkable.     Kidneys: Both kidneys are of normal echotexture, without mass or  hydronephrosis.   The craniocaudal dimensions are: right- 11.3, left-  10.0 cm.    Spleen: The spleen is normal in size,  measuring 10.2 cm in sagittal  dimension.    Aorta and IVC: The visualized portions of the aorta and IVC are  unremarkable. The proximal aorta measures 2.3 cm in diameter.        Impression    IMPRESSION:   Unremarkable ultrasound evaluation of the abdomen.    I have personally reviewed the examination and initial interpretation  and I agree with the findings.    NICOL FAJARDO MD         SYSTEM ID:  [id number]   US Lower Extremity Arterial Duplex Bilateral    Narrative    Exam: Duplex ultrasound of bilateral lower extremity arteries dated  3/18/2023 9:48 AM     Clinical information: Heart Failure pre Ventricular Assist Device  (VAD) planning     Comparison: 11/19/2018    Technique: Grayscale (B-mode), color Doppler, and duplex spectral  Doppler ultrasound of the lower extremity arteries. Velocity  measurements obtained with angle correction  of 60 degrees or less.    Ordering provider: Dilip Hassan    Findings:     Right lower extremity:     Common femoral artery: Velocity: 115 cm/sec. Waveforms: Triphasic  Profunda femoral artery: Velocity: 64 cm/sec. Waveforms: Triphasic  Proximal SFA: Velocity: 83 cm/sec. Waveforms: Triphasic  Mid SFA:Velocity:  93 cm/sec. Waveforms: Triphasic  Distal SFA: Velocity: 50 cm/sec. Waveforms: Triphasic    Popliteal artery, proximal: Velocity: 46 cm/sec. Waveforms: Triphasic    PTA ankle: Velocity: 42 cm/sec. Waveforms: Triphasic  HALMET ankle: Velocity: 31 cm/sec. Waveforms: Triphasic    Left lower extremity:    Common femoral artery: Velocity: 104 cm/sec. Waveforms: Triphasic  Deep femoral artery: Velocity: 152 cm/sec. Waveforms: Multiphasic  Proximal SFA: Velocity: 101 cm/sec. Waveforms: Multiphasic  Mid SFA: Velocity: 99 cm/sec. Waveforms: Triphasic  Distal SFA: Velocity: 64 cm/sec. Waveforms: Triphasic    Popliteal artery, proximal: Velocity: 70 cm/sec. Waveforms: Triphasic    PTA ankle: Velocity: 39 cm/sec. Waveforms: Triphasic  HAMLET ankle: Velocity: 55 cm/sec. Waveforms: Triphasic      Impression    Impression:   Normal Doppler evaluation of the bilateral lower extremity arterial  system.    Guidelines:    [facility] duplex criteria for lower limb arterial  occlusive disease    Percent stenosis:     Normal (1-19%): Peak systolic velocity (cm/s): <150, End-diastolic  velocity (cm/s): <40, Velocity ratio (Vr): <1.5, Distal arterial  waveform: Triphasic    20-49%: Peak systolic velocity (cm/s): 150-200, End-diastolic velocity  (cm/s): <40, Velocity ratio (Vr): 1.5-2.0, Distal arterial waveform:  Triphasic    50-75%: Peak systolic velocity (cm/s): 200-300, End-diastolic velocity  (cm/s): <90, Velocity ratio (Vr): 2.0-3.9, Distal arterial waveform:  Poststenotic turbulence distal to stenosis, monophasic distal waveform    >75%: Peak systolic velocity (cm/s): >300, End-diastolic velocity  (cm/s): <90, Velocity  ratio (Vr): >4.0, Distal arterial waveform:  Dampened distal waveform and low PSV/EDV* in the stenosis    Occlusion: Absent flow by color Doppler/pulsed Doppler spectral  analysis; length of occlusion estimated from distance between exit and  reentry collateral arteries    *PSV = peak systolic velocity, EDV = end-diastolic velocity  http://link.amaro.com/chapter/10.1007/[email or web address]_23/fulltext  html    I have personally reviewed the examination and initial interpretation  and I agree with the findings.    MAURICE VAN MD         SYSTEM ID:  [id number]

## 2023-03-19 NOTE — PLAN OF CARE
Major Shift Events:      No acute changes t/o PM; was able to sleep and is in good spirits. PRN oxy given t/o night for rib pain post CPR. Awaits further workup for LVAD in upcoming week. Lasix 80mg given twice; potassium result in AM 2.8.     For vital signs and complete assessments, please see documentation flowsheets.     Problem: Heart Failure  Goal: Optimal Coping  Intervention: Support Psychosocial Response  Recent Flowsheet Documentation  Taken 3/19/2023 0400 by Surjit Minor, RN  Supportive Measures:   active listening utilized   counseling provided  Taken 3/19/2023 0000 by Surjit Minor, RN  Supportive Measures:   active listening utilized   counseling provided  Taken 3/18/2023 2000 by Surjit Minor, RN  Supportive Measures:   active listening utilized   counseling provided

## 2023-03-19 NOTE — PLAN OF CARE
D/I: Patient on unit 4A ICU for heart failure/LVAD workup.  Neuro- WNL  CV-ST with BBB. NTP weaned to off, oral hydralazine increased.  See hemodynamic #s for details. No peripheral edema noted.    Pulm- Lungs coarse, productive cough, using IS and aerobika independently, on IV antibiotics.    GI-Tolerating diet, poor appetite. No BM, miralax given.       -Voids per urinal    Gtts-NTP weaned off.    Skin-Intact.   Pain- C/O rib/chest pain constant in nature.  Oxy 5mg given q4hrs, Lido patches on bilat ribs.   See flow sheets for further interventions and assessments.  A: Stable.  Lacks motivation to get out of bed/actvity.  Declined participation in OT or getting up to chair.  Gets OOB to use urinal.    P: Continue to monitor pt closely. Notify MD of significant changes.

## 2023-03-20 ENCOUNTER — HOSPITAL (OUTPATIENT)
Dept: NEUROPSYCHOLOGY | Facility: CLINIC | Age: 58
End: 2023-03-20

## 2023-03-20 ENCOUNTER — APPOINTMENT (OUTPATIENT)
Dept: ULTRASOUND IMAGING | Facility: CLINIC | Age: 58
DRG: 001 | End: 2023-03-20
Attending: STUDENT IN AN ORGANIZED HEALTH CARE EDUCATION/TRAINING PROGRAM
Payer: COMMERCIAL

## 2023-03-20 LAB
ABO/RH(D): NORMAL
ALBUMIN UR-MCNC: NEGATIVE MG/DL
ANION GAP SERPL CALCULATED.3IONS-SCNC: 14 MMOL/L (ref 7–15)
ANION GAP SERPL CALCULATED.3IONS-SCNC: 16 MMOL/L (ref 7–15)
ANTIBODY SCREEN: NEGATIVE
APPEARANCE UR: CLEAR
APTT PPP: 34 SECONDS (ref 22–38)
BACTERIA SPT CULT: NORMAL
BASE EXCESS BLDV CALC-SCNC: 4 MMOL/L (ref -7.7–1.9)
BASE EXCESS BLDV CALC-SCNC: 7 MMOL/L (ref -7.7–1.9)
BASE EXCESS BLDV CALC-SCNC: 8.6 MMOL/L (ref -7.7–1.9)
BILIRUB UR QL STRIP: NEGATIVE
BUN SERPL-MCNC: 18.4 MG/DL (ref 6–20)
BUN SERPL-MCNC: 24.8 MG/DL (ref 6–20)
CALCIUM SERPL-MCNC: 8 MG/DL (ref 8.6–10)
CALCIUM SERPL-MCNC: 9.2 MG/DL (ref 8.6–10)
CHLORIDE SERPL-SCNC: 88 MMOL/L (ref 98–107)
CHLORIDE SERPL-SCNC: 93 MMOL/L (ref 98–107)
CHOLEST SERPL-MCNC: 111 MG/DL
COLOR UR AUTO: NORMAL
CREAT SERPL-MCNC: 0.75 MG/DL (ref 0.67–1.17)
CREAT SERPL-MCNC: 1.03 MG/DL (ref 0.67–1.17)
CYSTATIN C (ROCHE): 1.3 MG/L (ref 0.6–1)
DEPRECATED HCO3 PLAS-SCNC: 25 MMOL/L (ref 22–29)
DEPRECATED HCO3 PLAS-SCNC: 30 MMOL/L (ref 22–29)
DRVVT SCREEN RATIO: 0.9
FERRITIN SERPL-MCNC: 286 NG/ML (ref 31–409)
GFR SERPL CREATININE-BSD FRML MDRD: 56 ML/MIN/1.73M2
GFR SERPL CREATININE-BSD FRML MDRD: 86 ML/MIN/1.73M2
GFR SERPL CREATININE-BSD FRML MDRD: >90 ML/MIN/1.73M2
GLUCOSE SERPL-MCNC: 107 MG/DL (ref 70–99)
GLUCOSE SERPL-MCNC: 168 MG/DL (ref 70–99)
GLUCOSE UR STRIP-MCNC: NEGATIVE MG/DL
GRAM STAIN RESULT: NORMAL
HCO3 BLDV-SCNC: 29 MMOL/L (ref 21–28)
HCO3 BLDV-SCNC: 32 MMOL/L (ref 21–28)
HCO3 BLDV-SCNC: 34 MMOL/L (ref 21–28)
HDLC SERPL-MCNC: 39 MG/DL
HGB UR QL STRIP: NEGATIVE
INR PPP: 1.3 (ref 0.85–1.15)
IRON BINDING CAPACITY (ROCHE): 285 UG/DL (ref 240–430)
IRON SATN MFR SERPL: 13 % (ref 15–46)
IRON SERPL-MCNC: 36 UG/DL (ref 61–157)
KETONES UR STRIP-MCNC: NEGATIVE MG/DL
LA PPP-IMP: NEGATIVE
LACTATE SERPL-SCNC: 1 MMOL/L (ref 0.7–2)
LACTATE SERPL-SCNC: 1.2 MMOL/L (ref 0.7–2)
LACTATE SERPL-SCNC: 1.3 MMOL/L (ref 0.7–2)
LDLC SERPL CALC-MCNC: 55 MG/DL
LEUKOCYTE ESTERASE UR QL STRIP: NEGATIVE
LUPUS INTERPRETATION: NORMAL
MAGNESIUM SERPL-MCNC: 1.9 MG/DL (ref 1.7–2.3)
MAGNESIUM SERPL-MCNC: 2 MG/DL (ref 1.7–2.3)
MDC_IDC_EPISODE_DTM: NORMAL
MDC_IDC_EPISODE_DTM: NORMAL
MDC_IDC_EPISODE_DURATION: 15 S
MDC_IDC_EPISODE_DURATION: 343 S
MDC_IDC_EPISODE_ID: NORMAL
MDC_IDC_EPISODE_ID: NORMAL
MDC_IDC_EPISODE_TYPE: NORMAL
MDC_IDC_EPISODE_TYPE: NORMAL
MDC_IDC_EPISODE_VENDOR_TYPE: NORMAL
MDC_IDC_EPISODE_VENDOR_TYPE: NORMAL
MDC_IDC_LEAD_IMPLANT_DT: NORMAL
MDC_IDC_LEAD_LOCATION: NORMAL
MDC_IDC_LEAD_LOCATION_DETAIL_1: NORMAL
MDC_IDC_LEAD_MFG: NORMAL
MDC_IDC_LEAD_MODEL: NORMAL
MDC_IDC_LEAD_POLARITY_TYPE: NORMAL
MDC_IDC_LEAD_SERIAL: NORMAL
MDC_IDC_MSMT_BATTERY_DTM: NORMAL
MDC_IDC_MSMT_BATTERY_REMAINING_LONGEVITY: 132 MO
MDC_IDC_MSMT_BATTERY_REMAINING_PERCENTAGE: 100 %
MDC_IDC_MSMT_BATTERY_STATUS: NORMAL
MDC_IDC_MSMT_CAP_CHARGE_DTM: NORMAL
MDC_IDC_MSMT_CAP_CHARGE_TIME: 10.4 S
MDC_IDC_MSMT_CAP_CHARGE_TYPE: NORMAL
MDC_IDC_MSMT_LEADCHNL_RV_IMPEDANCE_VALUE: 416 OHM
MDC_IDC_MSMT_LEADCHNL_RV_PACING_THRESHOLD_AMPLITUDE: 0.9 V
MDC_IDC_MSMT_LEADCHNL_RV_PACING_THRESHOLD_PULSEWIDTH: 0.4 MS
MDC_IDC_PG_IMPLANT_DTM: NORMAL
MDC_IDC_PG_MFG: NORMAL
MDC_IDC_PG_MODEL: NORMAL
MDC_IDC_PG_SERIAL: NORMAL
MDC_IDC_PG_TYPE: NORMAL
MDC_IDC_SESS_CLINIC_NAME: NORMAL
MDC_IDC_SESS_DTM: NORMAL
MDC_IDC_SESS_TYPE: NORMAL
MDC_IDC_SET_BRADY_LOWRATE: 40 {BEATS}/MIN
MDC_IDC_SET_BRADY_MODE: NORMAL
MDC_IDC_SET_LEADCHNL_RV_PACING_AMPLITUDE: 2 V
MDC_IDC_SET_LEADCHNL_RV_PACING_POLARITY: NORMAL
MDC_IDC_SET_LEADCHNL_RV_PACING_PULSEWIDTH: 0.4 MS
MDC_IDC_SET_LEADCHNL_RV_SENSING_ADAPTATION_MODE: NORMAL
MDC_IDC_SET_LEADCHNL_RV_SENSING_POLARITY: NORMAL
MDC_IDC_SET_LEADCHNL_RV_SENSING_SENSITIVITY: 0.6 MV
MDC_IDC_SET_ZONE_DETECTION_INTERVAL: 300 MS
MDC_IDC_SET_ZONE_DETECTION_INTERVAL: 353 MS
MDC_IDC_SET_ZONE_TYPE: NORMAL
MDC_IDC_SET_ZONE_TYPE: NORMAL
MDC_IDC_SET_ZONE_VENDOR_TYPE: NORMAL
MDC_IDC_SET_ZONE_VENDOR_TYPE: NORMAL
MDC_IDC_STAT_BRADY_DTM_END: NORMAL
MDC_IDC_STAT_BRADY_DTM_START: NORMAL
MDC_IDC_STAT_BRADY_RV_PERCENT_PACED: 0 %
MDC_IDC_STAT_EPISODE_RECENT_COUNT: 0
MDC_IDC_STAT_EPISODE_RECENT_COUNT: 1
MDC_IDC_STAT_EPISODE_RECENT_COUNT: 1
MDC_IDC_STAT_EPISODE_RECENT_COUNT: 13
MDC_IDC_STAT_EPISODE_RECENT_COUNT_DTM_END: NORMAL
MDC_IDC_STAT_EPISODE_RECENT_COUNT_DTM_START: NORMAL
MDC_IDC_STAT_EPISODE_TYPE: NORMAL
MDC_IDC_STAT_EPISODE_VENDOR_TYPE: NORMAL
MDC_IDC_STAT_TACHYTHERAPY_ATP_DELIVERED_RECENT: 0
MDC_IDC_STAT_TACHYTHERAPY_ATP_DELIVERED_TOTAL: 0
MDC_IDC_STAT_TACHYTHERAPY_RECENT_DTM_END: NORMAL
MDC_IDC_STAT_TACHYTHERAPY_RECENT_DTM_START: NORMAL
MDC_IDC_STAT_TACHYTHERAPY_SHOCKS_ABORTED_RECENT: 0
MDC_IDC_STAT_TACHYTHERAPY_SHOCKS_ABORTED_TOTAL: 0
MDC_IDC_STAT_TACHYTHERAPY_SHOCKS_DELIVERED_RECENT: 0
MDC_IDC_STAT_TACHYTHERAPY_SHOCKS_DELIVERED_TOTAL: 0
MDC_IDC_STAT_TACHYTHERAPY_TOTAL_DTM_END: NORMAL
MDC_IDC_STAT_TACHYTHERAPY_TOTAL_DTM_START: NORMAL
NITRATE UR QL: NEGATIVE
NONHDLC SERPL-MCNC: 72 MG/DL
NT-PROBNP SERPL-MCNC: ABNORMAL PG/ML (ref 0–900)
O2/TOTAL GAS SETTING VFR VENT: 2 %
O2/TOTAL GAS SETTING VFR VENT: 2 %
O2/TOTAL GAS SETTING VFR VENT: 21 %
OXYHGB MFR BLDV: 58 % (ref 70–75)
OXYHGB MFR BLDV: 64 % (ref 70–75)
OXYHGB MFR BLDV: 73 % (ref 70–75)
PCO2 BLDV: 44 MM HG (ref 40–50)
PCO2 BLDV: 45 MM HG (ref 40–50)
PCO2 BLDV: 48 MM HG (ref 40–50)
PH BLDV: 7.43 [PH] (ref 7.32–7.43)
PH BLDV: 7.46 [PH] (ref 7.32–7.43)
PH BLDV: 7.46 [PH] (ref 7.32–7.43)
PH UR STRIP: 5 [PH] (ref 5–7)
PHOSPHATE SERPL-MCNC: 2.4 MG/DL (ref 2.5–4.5)
PLPETH BLD-MCNC: <10 NG/ML
PO2 BLDV: 33 MM HG (ref 25–47)
PO2 BLDV: 35 MM HG (ref 25–47)
PO2 BLDV: 40 MM HG (ref 25–47)
POPETH BLD-MCNC: <10 NG/ML
POTASSIUM SERPL-SCNC: 3.7 MMOL/L (ref 3.4–5.3)
POTASSIUM SERPL-SCNC: 3.9 MMOL/L (ref 3.4–5.3)
PREALB SERPL IA-MCNC: 15 MG/DL (ref 15–45)
PSA SERPL DL<=0.01 NG/ML-MCNC: 0.24 NG/ML (ref 0–3.5)
PTT RATIO: 0.97
RBC URINE: 1 /HPF
SODIUM SERPL-SCNC: 132 MMOL/L (ref 136–145)
SODIUM SERPL-SCNC: 134 MMOL/L (ref 136–145)
SP GR UR STRIP: 1.01 (ref 1–1.03)
SPECIMEN EXPIRATION DATE: NORMAL
THROMBIN TIME: 16.8 SECONDS (ref 13–19)
TRANSFERRIN SERPL-MCNC: 170 MG/DL (ref 200–360)
TRIGL SERPL-MCNC: 85 MG/DL
TSH SERPL DL<=0.005 MIU/L-ACNC: 0.73 UIU/ML (ref 0.3–4.2)
UROBILINOGEN UR STRIP-MCNC: NORMAL MG/DL
WBC URINE: 0 /HPF

## 2023-03-20 PROCEDURE — 85730 THROMBOPLASTIN TIME PARTIAL: CPT | Performed by: STUDENT IN AN ORGANIZED HEALTH CARE EDUCATION/TRAINING PROGRAM

## 2023-03-20 PROCEDURE — 250N000013 HC RX MED GY IP 250 OP 250 PS 637: Performed by: STUDENT IN AN ORGANIZED HEALTH CARE EDUCATION/TRAINING PROGRAM

## 2023-03-20 PROCEDURE — G0103 PSA SCREENING: HCPCS | Performed by: STUDENT IN AN ORGANIZED HEALTH CARE EDUCATION/TRAINING PROGRAM

## 2023-03-20 PROCEDURE — 83550 IRON BINDING TEST: CPT | Performed by: STUDENT IN AN ORGANIZED HEALTH CARE EDUCATION/TRAINING PROGRAM

## 2023-03-20 PROCEDURE — 83605 ASSAY OF LACTIC ACID: CPT

## 2023-03-20 PROCEDURE — 80061 LIPID PANEL: CPT | Performed by: STUDENT IN AN ORGANIZED HEALTH CARE EDUCATION/TRAINING PROGRAM

## 2023-03-20 PROCEDURE — 80321 ALCOHOLS BIOMARKERS 1OR 2: CPT | Performed by: STUDENT IN AN ORGANIZED HEALTH CARE EDUCATION/TRAINING PROGRAM

## 2023-03-20 PROCEDURE — 85610 PROTHROMBIN TIME: CPT | Performed by: STUDENT IN AN ORGANIZED HEALTH CARE EDUCATION/TRAINING PROGRAM

## 2023-03-20 PROCEDURE — 84134 ASSAY OF PREALBUMIN: CPT | Performed by: STUDENT IN AN ORGANIZED HEALTH CARE EDUCATION/TRAINING PROGRAM

## 2023-03-20 PROCEDURE — 85390 FIBRINOLYSINS SCREEN I&R: CPT | Mod: 26 | Performed by: PATHOLOGY

## 2023-03-20 PROCEDURE — 87040 BLOOD CULTURE FOR BACTERIA: CPT | Performed by: INTERNAL MEDICINE

## 2023-03-20 PROCEDURE — 82805 BLOOD GASES W/O2 SATURATION: CPT | Performed by: STUDENT IN AN ORGANIZED HEALTH CARE EDUCATION/TRAINING PROGRAM

## 2023-03-20 PROCEDURE — 80048 BASIC METABOLIC PNL TOTAL CA: CPT | Performed by: STUDENT IN AN ORGANIZED HEALTH CARE EDUCATION/TRAINING PROGRAM

## 2023-03-20 PROCEDURE — 84466 ASSAY OF TRANSFERRIN: CPT | Performed by: STUDENT IN AN ORGANIZED HEALTH CARE EDUCATION/TRAINING PROGRAM

## 2023-03-20 PROCEDURE — 81001 URINALYSIS AUTO W/SCOPE: CPT | Performed by: STUDENT IN AN ORGANIZED HEALTH CARE EDUCATION/TRAINING PROGRAM

## 2023-03-20 PROCEDURE — 83735 ASSAY OF MAGNESIUM: CPT | Performed by: STUDENT IN AN ORGANIZED HEALTH CARE EDUCATION/TRAINING PROGRAM

## 2023-03-20 PROCEDURE — 90791 PSYCH DIAGNOSTIC EVALUATION: CPT

## 2023-03-20 PROCEDURE — 99221 1ST HOSP IP/OBS SF/LOW 40: CPT | Mod: GC | Performed by: INTERNAL MEDICINE

## 2023-03-20 PROCEDURE — 84443 ASSAY THYROID STIM HORMONE: CPT | Performed by: STUDENT IN AN ORGANIZED HEALTH CARE EDUCATION/TRAINING PROGRAM

## 2023-03-20 PROCEDURE — 82610 CYSTATIN C: CPT | Performed by: STUDENT IN AN ORGANIZED HEALTH CARE EDUCATION/TRAINING PROGRAM

## 2023-03-20 PROCEDURE — 86832 HLA CLASS I HIGH DEFIN QUAL: CPT | Performed by: STUDENT IN AN ORGANIZED HEALTH CARE EDUCATION/TRAINING PROGRAM

## 2023-03-20 PROCEDURE — 250N000011 HC RX IP 250 OP 636: Performed by: INTERNAL MEDICINE

## 2023-03-20 PROCEDURE — 93922 UPR/L XTREMITY ART 2 LEVELS: CPT | Mod: 26 | Performed by: RADIOLOGY

## 2023-03-20 PROCEDURE — 82728 ASSAY OF FERRITIN: CPT | Performed by: STUDENT IN AN ORGANIZED HEALTH CARE EDUCATION/TRAINING PROGRAM

## 2023-03-20 PROCEDURE — 200N000002 HC R&B ICU UMMC

## 2023-03-20 PROCEDURE — 250N000011 HC RX IP 250 OP 636: Performed by: STUDENT IN AN ORGANIZED HEALTH CARE EDUCATION/TRAINING PROGRAM

## 2023-03-20 PROCEDURE — 86833 HLA CLASS II HIGH DEFIN QUAL: CPT | Performed by: STUDENT IN AN ORGANIZED HEALTH CARE EDUCATION/TRAINING PROGRAM

## 2023-03-20 PROCEDURE — 93922 UPR/L XTREMITY ART 2 LEVELS: CPT

## 2023-03-20 PROCEDURE — 86850 RBC ANTIBODY SCREEN: CPT | Performed by: STUDENT IN AN ORGANIZED HEALTH CARE EDUCATION/TRAINING PROGRAM

## 2023-03-20 PROCEDURE — 84100 ASSAY OF PHOSPHORUS: CPT | Performed by: STUDENT IN AN ORGANIZED HEALTH CARE EDUCATION/TRAINING PROGRAM

## 2023-03-20 PROCEDURE — 96132 NRPSYC TST EVAL PHYS/QHP 1ST: CPT

## 2023-03-20 PROCEDURE — 83880 ASSAY OF NATRIURETIC PEPTIDE: CPT | Performed by: STUDENT IN AN ORGANIZED HEALTH CARE EDUCATION/TRAINING PROGRAM

## 2023-03-20 PROCEDURE — 86901 BLOOD TYPING SEROLOGIC RH(D): CPT | Performed by: STUDENT IN AN ORGANIZED HEALTH CARE EDUCATION/TRAINING PROGRAM

## 2023-03-20 PROCEDURE — 250N000013 HC RX MED GY IP 250 OP 250 PS 637

## 2023-03-20 PROCEDURE — 96133 NRPSYC TST EVAL PHYS/QHP EA: CPT

## 2023-03-20 PROCEDURE — 250N000013 HC RX MED GY IP 250 OP 250 PS 637: Performed by: INTERNAL MEDICINE

## 2023-03-20 PROCEDURE — 99291 CRITICAL CARE FIRST HOUR: CPT | Mod: 25 | Performed by: INTERNAL MEDICINE

## 2023-03-20 PROCEDURE — 81382 HLA II TYPING 1 LOC HR: CPT | Performed by: STUDENT IN AN ORGANIZED HEALTH CARE EDUCATION/TRAINING PROGRAM

## 2023-03-20 RX ORDER — ONDANSETRON 2 MG/ML
4 INJECTION INTRAMUSCULAR; INTRAVENOUS EVERY 6 HOURS PRN
Status: DISCONTINUED | OUTPATIENT
Start: 2023-03-20 | End: 2023-03-23

## 2023-03-20 RX ORDER — HYDRALAZINE HYDROCHLORIDE 50 MG/1
100 TABLET, FILM COATED ORAL EVERY 6 HOURS SCHEDULED
Status: DISCONTINUED | OUTPATIENT
Start: 2023-03-20 | End: 2023-03-23

## 2023-03-20 RX ORDER — HYDRALAZINE HYDROCHLORIDE 25 MG/1
25 TABLET, FILM COATED ORAL ONCE
Status: COMPLETED | OUTPATIENT
Start: 2023-03-20 | End: 2023-03-20

## 2023-03-20 RX ORDER — MILRINONE LACTATE 0.2 MG/ML
0.25 INJECTION, SOLUTION INTRAVENOUS CONTINUOUS
Status: DISCONTINUED | OUTPATIENT
Start: 2023-03-20 | End: 2023-03-23

## 2023-03-20 RX ORDER — POTASSIUM CHLORIDE 29.8 MG/ML
20 INJECTION INTRAVENOUS ONCE
Status: COMPLETED | OUTPATIENT
Start: 2023-03-20 | End: 2023-03-20

## 2023-03-20 RX ORDER — FUROSEMIDE 10 MG/ML
80 INJECTION INTRAMUSCULAR; INTRAVENOUS ONCE
Status: COMPLETED | OUTPATIENT
Start: 2023-03-20 | End: 2023-03-20

## 2023-03-20 RX ADMIN — HYDRALAZINE HYDROCHLORIDE 100 MG: 100 TABLET, FILM COATED ORAL at 23:59

## 2023-03-20 RX ADMIN — DOXYCYCLINE HYCLATE 100 MG: 100 CAPSULE ORAL at 19:46

## 2023-03-20 RX ADMIN — HYDRALAZINE HYDROCHLORIDE 25 MG: 25 TABLET, FILM COATED ORAL at 11:13

## 2023-03-20 RX ADMIN — MILRINONE LACTATE IN DEXTROSE 0.12 MCG/KG/MIN: 200 INJECTION, SOLUTION INTRAVENOUS at 11:09

## 2023-03-20 RX ADMIN — ONDANSETRON 4 MG: 2 INJECTION INTRAMUSCULAR; INTRAVENOUS at 09:38

## 2023-03-20 RX ADMIN — CEFTRIAXONE SODIUM 2 G: 2 INJECTION, POWDER, FOR SOLUTION INTRAMUSCULAR; INTRAVENOUS at 10:46

## 2023-03-20 RX ADMIN — DIGOXIN 125 MCG: 125 TABLET ORAL at 08:18

## 2023-03-20 RX ADMIN — HYDRALAZINE HYDROCHLORIDE 100 MG: 100 TABLET, FILM COATED ORAL at 14:07

## 2023-03-20 RX ADMIN — FUROSEMIDE 80 MG: 10 INJECTION, SOLUTION INTRAVENOUS at 10:45

## 2023-03-20 RX ADMIN — OXYCODONE HYDROCHLORIDE 5 MG: 5 TABLET ORAL at 15:34

## 2023-03-20 RX ADMIN — OXYCODONE HYDROCHLORIDE 5 MG: 5 TABLET ORAL at 23:59

## 2023-03-20 RX ADMIN — OXYCODONE HYDROCHLORIDE 5 MG: 5 TABLET ORAL at 19:46

## 2023-03-20 RX ADMIN — DOXYCYCLINE HYCLATE 100 MG: 100 CAPSULE ORAL at 08:18

## 2023-03-20 RX ADMIN — OXYCODONE HYDROCHLORIDE 5 MG: 5 TABLET ORAL at 04:23

## 2023-03-20 RX ADMIN — OXYCODONE HYDROCHLORIDE 5 MG: 5 TABLET ORAL at 11:13

## 2023-03-20 RX ADMIN — HYDRALAZINE HYDROCHLORIDE 100 MG: 100 TABLET, FILM COATED ORAL at 18:11

## 2023-03-20 RX ADMIN — LIDOCAINE PATCH 4% 2 PATCH: 40 PATCH TOPICAL at 08:18

## 2023-03-20 RX ADMIN — HYDRALAZINE HYDROCHLORIDE 75 MG: 25 TABLET, FILM COATED ORAL at 08:18

## 2023-03-20 RX ADMIN — POTASSIUM CHLORIDE 20 MEQ: 29.8 INJECTION, SOLUTION INTRAVENOUS at 06:50

## 2023-03-20 ASSESSMENT — ACTIVITIES OF DAILY LIVING (ADL)
ADLS_ACUITY_SCORE: 20

## 2023-03-20 NOTE — PROGRESS NOTES
The patient was seen for neuropsychological evaluation at the request of Dilip Hassan MD for the purposes of diagnostic clarification and treatment planning. 84 minutes of test administration and scoring were provided by this writer. Please see Dr. Elle Ma's report for a full interpretation of the findings.    Dedra Veloz   Psychometrist

## 2023-03-20 NOTE — PLAN OF CARE
Major Shift Events:       No acute changes t/o PM; was able to sleep; PRN oxy given t/o night for rib pain. Neuro/Psych to evaluate today.     For vital signs and complete assessments, please see documentation flowsheets.      Problem: Heart Failure  Goal: Optimal Coping  Intervention: Support Psychosocial Response

## 2023-03-20 NOTE — PLAN OF CARE
Neuro: AOx4; independent; rib pain controlled with oxy  Cardio: NITA q6h (see chart for numbers), tachycardic 110-120s, MAP goal between 65-80, SR milrinone started, afebrile  Respiratory: 2L NC  GI/: 80mg lasix given, good response; active BS, 2g Na diet with 2L FR  Skin: intact      Handoff given to following RN. For VS and complete assessments, please see documentation flowsheets.

## 2023-03-20 NOTE — CONSULTS
NAME  Akshat Fragoso    MRN  [id number]      67     AGE  57     SEX  Male     HANDEDNESS Right     EDUCATION 8     JOHNSON  3/20/23     PROVIDER       BLAZER & FLIP FLOPS  OK     STATION  INPT            ORIENTATION      Time  -1     Place      Personal Info.     Presidents                           WRAT   5     SS %ile GE   Word Reading 86 18 8.2                 WAIS IV DIGIT SPAN        Raw ss=    Forward  8 8    Backward  6 7    Sequencing  6 7    Total  20 7           RDS  8             NAB NAMING      Raw 31      z Score 0.33      T 55                     RBANS   B     Raw ss/%ile    List Learning 25 8    Story Memory 14 6    Figure Copy 15 5    Line Orientation 16 26-50    Picture Naming 10 51-75    Semantic Fluency 21 10    Digit Span  9 8    Coding  28 4    List Recall  5 26-50    List Recognition 18 10-16    Story Recall 7 7    Figure Recall 21 6             Index     Immediate Mem. 83     Visuospat./Constr. 81     Language  99     Attention  75     Delayed Mem. 81     Total Scale 79

## 2023-03-20 NOTE — PROGRESS NOTES
[facility]    Cardiology Progress Note- Cardiology      Date of Admission:  3/15/2023     Assessment & Plan: SL   Akshatjaimee Fragoso is a 57 year old man with past medical history of HFrEF (EF 10% 5/2022) 2/2 NICM s/p ICD, tobacco use disorder, marijuana use, HTN, lumbar radiculopathy, CKD who presents after being found down at home. Found to have had a prolonged episode of VT with concern for cardiac arrest s/p ROSC in the field.    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 Presented with out of hospital cardiac arrest with VT for 5 min 43 seconds of VT at 199 bpm, with spontaneous conversion to sinus rhythm without defibrillation on device interrogation on 3/ 15/2023. He had  A run of  VT while here, with hypotension on 3/16/18. Did not tolerate amiodarone gtt, due to hypotension.  Has been in sinus since then. Has been managed here with diuresis and afterload reducing agents with hemodynamic monitoring. Patient started workup at [facility] for LVAD as DT (initially evaluated for transplant though patient having trouble quitting marijuana/tobacco use). Now has started work up for LVAD  Here.     Recent studies:  - Coronary angiogram 2/2017 minimal nonobstructive  coronary disease  - Echocardiogram 3/16/23: LVIDd 7.7, EF 5-10%  - CPX: Peak VO2: 13.3 ml/kg/min 3/8/21, with RER of 1.00      DATE MAP CVP PAP PCWP Carlos CO Carlos CI SVR MVo2 Therapies   3/18 72 4 64/34 22 4.4 2.4  72 Nipride  1.15   3/19 77 8 58/34 26 4.6 2.5 1200 72 Nipride, hydralazine   3/20 84 13 62/46/45 40 3.7 2 1315 64 Hydralazine       - VT secondary prevention while inpatient: held amiodarone due to intolerance with hypotension  - Continue PTA digoxin 125 mcg  - Inotropes: start milrinone 0.125  - Volume status: hypervolemic, lasix 80 mg IV this AM  - BB: Hold PTA carvedilol 12.5 mg BID  - ACEi/ARB/ARNI: hold PTA lisinopril 5 mg BID  - afterload reduction: hydralazine 75 -> 100mg q6h  - MRA:  PTA spironolactone 12.5 mg BID  - SGLT2i: none PTA  - SCD ppx/BiV pacer: ICD, reprogrammed device to provide ATP burst at VT threshold  - discontinued PTA aspirin as there is no clear indication  - Fluid restrict, daily I/O, daily weights, lytes checks   Started LVAD/Heart transplant eval   -- CT head- normal   -- CT chest-abdomen and pelvis:  Basilar atelectasis with faint GGO. Colonic diverticulosis without diverticulitis  -- Upper E xtremity doppler: pending   -- Lower ext arterial doppler: Normal     -- CT dental: done periodontal disease, NO evidence of cellulitis or abscess  -- US Carotid  -- US MISAEL bilateral: Pending   -- SW consult: pending  -- Neuropsych consult: L pending  -- GI Pending  -- Heme/onc: pending   -- Palliative care consult, recs appreciated  -- Cardiothoracic consult: pending    -- Dental consult: carious lesions on #14 and #2 teeth, follow up with existing dentist  -- Nutrition consult : done  -- PT/OT     -- 6 min walk test: pending     # Documented history of atrial fibrillation  Currently in sinus, unable to see in notes from [facility] where this was found. Not on anticoagulation  - Telemetry here     Pulmonary  # Tobacco use disorder  Has cut back it appears to a few cigarettes daily, about 40  pack year history     # Marijuana use  Uses for anxiety        Gastrointestinal, Nutrition  #no issues     Renal, Electrolytes  # CKD  Likely in setting of cardiorenal pathology over time. Cr baseline   - Monitor  Hypokalemia: replace  Hypomagnesemia : replace  Hypocalcemia: replace     Infectious Disease  # Leukocytosis  # C/f CAP  Increasing WBC, with cough, chest pain, and purulent sputum production and GGO on chest CT is  c/f CAP.    -ceftriaxone (3/18-*)  -doxycycline (3/18-*)     Hematology  #no issues      Endocrinology  #no issues     Integumentary:  #no skin issues    Diet: 2 Gram Sodium Diet  Fluid restriction 2000 ML FLUID    DVT Prophylaxis: Pneumatic Compression Devices  Adkins Catheter: Not present  Cardiac Monitoring: ACTIVE order. Indication: Tachyarrhythmias, acute (48 hours)  Code Status: Full Code          Clinically Significant Risk Factors        # Hypokalemia: Lowest K = 2.8 mmol/L in last 2 days, will replace as needed   # Hypocalcemia: Lowest iCa = 4.1 mg/dL in last 2 days, will monitor and replace as appropriate   # Hypomagnesemia: Lowest Mg = 1.3 mg/dL in last 2 days, will replace as needed     # Thrombocytopenia: Lowest platelets = 122 in last 2 days, will monitor for bleeding                  Disposition Plan   Post potential advanced therapies, >3 days    Entered: Phil Hoffman MD 03/20/2023, 10:50 AM   The patient's care was discussed with the Attending Physician, Dr. Aguiar.      Phil Hoffman MD  Internal Medicine PGY3  [facility]  ______________________________________________________________________    Interval History   Nauseous overnight, uncomfortable requiring zofran. Blood pressure stable off nipride.    Physical Exam   Vital Signs: Temp: 98.8  F (37.1  C) Temp src: Axillary BP: 104/78 Pulse: 109   Resp: 12 SpO2: 94 % O2 Device: None (Room air) Oxygen Delivery: 2 LPM  Weight: 159 lbs 2.75 oz  Temp: 98.8  F (37.1  C) Temp  Min:  98.4  F (36.9  C)  Max: 99.4  F (37.4  C)  Resp: 12 Resp  Min: 12  Max: 16  SpO2: 94 % SpO2  Min: 88 %  Max: 99 %  Pulse: 109 Pulse  Min: 87  Max: 127    No data recorded  BP: 104/78 Systolic (24hrs), Av , Min:81 , Max:113   Diastolic (24hrs), Av, Min:49, Max:85    GEN: somewhat uncomfortable appearing, fan blowing in face  HEENT: no icterus  CV: RRR, S3   CHEST: CTAB  ABD: soft, NT/ND, NABS  NEURO: AA&Ox3, fluent/appropriate, motor grossly nonfocal  PSYCH: cooperative, affect appropriate    Medical Decision Making       Please see A&P for additional details of medical decision making.      Data: reviewed over last 24 hours

## 2023-03-20 NOTE — CONSULTS
INPATIENT NEUROPSYCHOLOGICAL EVALUATION  **CONFIDENTIAL**    **This is a medical document intended for communication with the referring provider. It is written in medical language and may contain abbreviations or verbiage that are unfamiliar. It may appear blunt or direct. This report and the results within are not intended to be interpreted in isolation without consultation with your medical provider. **      Name: Akshat Fragoso  Education: 8 years    (age): 1965 (57 years) JOHNSON:  3/20/2023   Referral: Dilip Hassan MD & Shaun Aguiar MD  Department of Cardiology Handedness:  MRN (Epic): Right  [id number]     IDENTIFYING INFORMATION AND REASON FOR REFERRAL   Mr. Fragoso is a 57-year-old, right-handed, White male with a medical history including nonischemic cardiomyopathy, congestive heart failure, and ventricular tachycardia. He was admitted to the ED on 3/15/2023 following cardiac arrest. This evaluation was requested to provide an assessment of his current neuropsychological status as part of a comprehensive workup for advanced heart therapies.    IMPRESSION  See below for relevant background information and detailed test results. See separate abstract for supporting documentation including a list of neuropsychological measures and test scores. Due to fatigue and dozing off during testing, an abbreviated battery was administered. A second attempt was made to continue cognitive testing later in the day; however, he declined to complete the evaluation due to feeling fatigued, nauseous and overwhelmed.    In the context of estimated low average premorbid intellectual abilities, Mr. Fragoso  performance on this brief neuropsychological battery revealed a weakness (i.e., below average score) on a test of psychomotor processing speed. Other performances were within expectation including immediate auditory attention and working memory, naming, semantic verbal fluency, visuospatial processing, and verbal and visual  learning and memory.     With regard to transplant candidacy, Mr. Fragoso demonstrated an adequate understanding of his cardiac illness and risks and benefits of LVAD implantation. He expressed a moderate-to-high degree of motivation for this advanced therapy. Although he described some normative worry about stroke as a complication of this treatment, he reported he wants to receive the treatment to be there for his family and to have more energy to do things with them. Despite ongoing nicotine and cannabis use, he acknowledged the need to terminate this use if pursuing LVAD implant and he was confident in his ability to do so. He endorsed normative low mood and frustration in the context of his medical conditions and described some worry about his son, who was admitted to the ED for panic attack the night before this evaluation. He denied other significant psychopathology. He appears to have strong psychosocial supports in place and recognizes the need for increased functional assistance during the post-operative period. He also demonstrates good treatment compliance, which are all positive outcome indicators.     Overall, results of this abbreviated neuropsychological evaluation revealed Mr. Fragoso is functioning quite well from a neuropsychological standpoint and there is no evidence of acquired neurologic disease or underlying dementia. He exhibited a select weakness on a test of processing speed, which can be reasonably attributed to his cardiac condition and notable fatigue.  Aside from needing to continue to abstain from tobacco and cannabis, there are no indications of substance use concerns. There are no obvious neuropsychological contraindications for LVAD implantation.    RECOMMENDATIONS  1. Mr. Fragoso is encouraged to live a healthy lifestyle that promotes brain health including getting appropriate exercise, as advised by his healthcare providers, eating healthy, continued abstinance from cigarettes and  cannabis, and practicing good sleep hygiene.   2. The current evaluation will serve as an objective cognitive baseline against which results of future evaluations may be compared.  No follow-up is currently indicated; however, Mr. Fragoso may be referred for a repeat neuropsychological evaluation if there is significant change in cognitive status in the future.     Thank you for the opportunity to participate in Mr. Fragoso  care. If you have any questions regarding this evaluation, please do not hesitate to contact me.       Elle Ma, Ph.D.,   Clinical Neuropsychologist    RELEVANT BACKGROUND INFORMATION AND SUPPORTING DOCUMENTATION  Gathered from a clinical interview with the patient and reviews of electronic medical record. The clinical interview portion of this evaluation (91522) was conducted via a telehealth visit in lieu of in-person visit due to the coronavirus emergency.     History of Presenting Problem(s)  Mr. Fragsoo presented with a medical history including nonischemic cardiomyopathy, congestive heart failure, and ventricular tachycardia. He was admitted to the ED on 3/15/2023 following cardiac arrest. He reported subtle difficulties with attention since his hospital admission in the context of fatigue and stress but denied noting cognitive changes prior to this hospitalization. He is functionally independent with activities of daily living including managing appointments, medications, meal preparation, and driving. Physically, he reported fatigue, SOB, as well as chest and leg pain. He also reported significant rib pain from CPR administered 3/15/2023. Emotionally, he reported feeling frustrated that he was misdiagnosed with pneumonia for so long before discovery of his heart failure. He also reported stress in the context of his son being admitted to the hospital for a panic attack. He denied symptoms of depression, excessive anxiety, or other indicators of psychological distress, and he  denied any significant mental health history.      Mr. Fragoso expressed a moderate-to-high degree of motivation for advanced heart therapies and stated he wants to live for his family and have more energy to do things with them. He was able to articulate a basic lay knowledge and understanding of his medical conditions and he was able to name several of his medications. He identified some risks of transplant (i.e., stroke) and he was aware of some necessary lifestyle changes required such as charging battery packs, sterile dressing changes, and avoiding water. He is living with his wife and son, which is a stable living situation. His wife will provide post-transplant assistance as needed. He reported a strong social support network of family and [employer] friends.     Neurodiagnostic Findings   ? Head CT w/o contrast (3/17/2023): IMPRESSION: No acute intracranial pathology. Stable Encephalomalacia of the left medial occipital lobe.    Medical History (per EMR)  ? Nonischemic cardiomyopathy  ? Congestive heart failure  ? Ventricular tachycardia  ? Chronic kidney disease    Health Behaviors  ? Sleep: Up to 12 hours of sleep nightly; denied delay in onset but reported frequent waking due to nocturia; denied snoring, gasping arousals or parasomnic behaviors; energy is low most days  ? Exercise: Walking  ? Pain: Reported rib pain related to recent CPR, rated 8 or 9/10 at the time of this evaluation.     Psychiatric History  ? Mr. Fragoso reported current mood is  bummed out  related to his health and  frustrated  that his heart disease was misdiagnosed for so long. He reported currently being worried about his son, who was just admitted to the ED for panic attack.   ? He otherwise denied history of significant depressive, irritable, hypomanic or manic mood symptoms, excessive rumination, worry or uncontrollable anxiety, engagement in repetitive or compulsive behaviors, alteration of sensory perceptual processes or  disordered thought.  ? Suicidality/ Self-harm: He denied any suicidal ideation, plan, or intent.   ? Psychiatric treatment: Engaged in marital and individual psychotherapy in the past (terminated ~1 year ago following [facility] of his therapist)    Substance Use History  ? Alcohol: None  ? Nicotine: Was previously smoking 1-2 cigarettes per week; has no intention of continuing to smoke  ? Cannabis: History of cannabis use (approximately every other day); last used a couple of weeks ago and he reportedly has no intention of continuing to use cannabis  ? Problematic Substance Use:     Developmental, Educational, & Occupational History  ? Gestational: Premature by  a couple of months   ?  complications: Hole in heart until age 7  ? Developmental milestones: Met at expected ages  ? Childhood behavioral / emotional / academic problems: History of dyslexia and learning difficulties in school; did not participate in special education or receive academic accommodations and received poor grades  ? Native Language: English  ? Education: Completed 8th grade  ? 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 Born and raised in [place], [place]  ? Marital:  to wife of 28 years   ? Children: 1 daughter (24y), 1 son (18y)  ? Housing: Lives with wife and son  ? Psychosocial support: Strong social support network of wife, mother-in-law, and [employer] friends    Family History  ? Parkinson s disease (mother)  ? Stroke (father)  ? No known history of dementia    RESULTS  See separate abstract for list of neuropsychological measures and test scores. Due to fatigue (observed falling asleep during testing) the testing session was abbreviated. The /technician returned to complete testing later in the afternoon; however, he declined to continue the evaluation due to feeling fatigued, nauseous, and overwhelmed.    PRE-MORBID ABILITY: Premorbid abilities were estimated within the low  average range based on single word reading ability. His reading was estimated at the 8th grade level, consistent with his level of education.  GENERAL COGNITIVE STATUS: Orientation was within expectation for general personal information, place, time, and cultural information.  ATTENTION/EXECUTIVE FUNCTIONS: Immediate auditory attention and working memory performances were low average to average. Psychomotor processing speed performance was below average.   LANGUAGE: Confrontation naming performance was average. Semantic verbal fluency performance was average.   VISUOSPATIAL PROCESSING: Performance on a spatial perception task requiring judgement of angled lines was average. Copy of a complex figure was below average and notable for quick and careless drawing and inattention to detail; however, the overall figural gestalt was preserved.   LEARNING AND MEMORY: Initial encoding of a word list over multiple learning trials was average. Delayed recall of the list was average. Recognition of the word list was low average. Initial encoding of contextualized verbal information in the form of a short story was low average and delayed retrieval was low average. Retrieval of visual information was low average.   PERFORMANCE VALIDITY: Performance on neuropsychological tests is dependent upon a number of factors, including sufficient engagement and motivation, to reliably establish an examinee s level of cognitive functioning.  Based upon observations made throughout the evaluation, the patient did not appear to deliberately perform in a suboptimal manner. He was notably fatigued during testing and observed to be falling asleep, so an attempt was made to continue testing later in the day; however, he declined to continue the evaluation at that time. Score on an imbedded index of performance validity was in the valid range. Overall, the test results obtained are believed to accurately represent the patient s current  neurocognitive status.    BEHAVIORAL OBSERVATIONS  ? Fatigued and observed to be falling asleep during testing; an attempt was made to continue testing later in the day and he declined to complete the evaluation at that time  ? Appearance: Dressed in hospital gown  ? Gait/Posture: Not assessed; patient evaluated in hospital bed  ? Sensorimotor: No abnormalities noted  ? Behavior: No behavioral abnormalities noted; declined to complete the full evaluation  ? Speech: Fluent, articulate, normal rate, prosody, and volume; no conversational word finding difficulty  ? Thought process: Logical, linear, and goal-directed   ? Thought content: Logical, appropriate   ? Affect: Blunted, restricted; good eye contact  ? Mood: Generally euthymic  ? Insight and Judgment: Intact  ? Approach to testing: Efficient, deliberate; adequate frustration on cognitively demanding tasks  ? Rapport: Established and maintained      The clinical interview portion of this evaluation (13025) was conducted via a telehealth visit in lieu of in-person visit due to the coronavirus emergency.     Activities included in this evaluation: CPT Code #Units Time (min)   Psychiatric diagnostic interview 23269  1 --   Test evaluation services by professional; first hour. 72424 1 100   Test evaluation services by professional, additional hour (+) 94305 1    Test administration and scoring by technician, first 30 mins [id number] 1 84   Test administration and scoring by technician, additional 30 mins (+) [id number] 2

## 2023-03-21 ENCOUNTER — APPOINTMENT (OUTPATIENT)
Dept: GENERAL RADIOLOGY | Facility: CLINIC | Age: 58
DRG: 001 | End: 2023-03-21
Attending: STUDENT IN AN ORGANIZED HEALTH CARE EDUCATION/TRAINING PROGRAM
Payer: COMMERCIAL

## 2023-03-21 LAB
ALBUMIN SERPL BCG-MCNC: 3.9 G/DL (ref 3.5–5.2)
ALP SERPL-CCNC: 86 U/L (ref 40–129)
ALT SERPL W P-5'-P-CCNC: 19 U/L (ref 10–50)
AMPHETAMINES UR QL SCN: ABNORMAL
ANION GAP SERPL CALCULATED.3IONS-SCNC: 13 MMOL/L (ref 7–15)
ANION GAP SERPL CALCULATED.3IONS-SCNC: 14 MMOL/L (ref 7–15)
ANION GAP SERPL CALCULATED.3IONS-SCNC: 15 MMOL/L (ref 7–15)
AST SERPL W P-5'-P-CCNC: 31 U/L (ref 10–50)
BARBITURATES UR QL SCN: ABNORMAL
BASOPHILS # BLD AUTO: 0.1 10E3/UL (ref 0–0.2)
BASOPHILS NFR BLD AUTO: 1 %
BENZODIAZ UR QL SCN: ABNORMAL
BILIRUB SERPL-MCNC: 1.1 MG/DL
BUN SERPL-MCNC: 22.6 MG/DL (ref 6–20)
BUN SERPL-MCNC: 23.8 MG/DL (ref 6–20)
BUN SERPL-MCNC: 27.8 MG/DL (ref 6–20)
BZE UR QL SCN: ABNORMAL
CALCIUM SERPL-MCNC: 9.1 MG/DL (ref 8.6–10)
CALCIUM SERPL-MCNC: 9.4 MG/DL (ref 8.6–10)
CALCIUM SERPL-MCNC: 9.5 MG/DL (ref 8.6–10)
CANNABINOIDS UR QL SCN: ABNORMAL
CHLORIDE SERPL-SCNC: 87 MMOL/L (ref 98–107)
CHLORIDE SERPL-SCNC: 88 MMOL/L (ref 98–107)
CHLORIDE SERPL-SCNC: 88 MMOL/L (ref 98–107)
CREAT SERPL-MCNC: 0.88 MG/DL (ref 0.67–1.17)
CREAT SERPL-MCNC: 0.92 MG/DL (ref 0.67–1.17)
CREAT SERPL-MCNC: 0.97 MG/DL (ref 0.67–1.17)
DEPRECATED HCO3 PLAS-SCNC: 27 MMOL/L (ref 22–29)
DEPRECATED HCO3 PLAS-SCNC: 27 MMOL/L (ref 22–29)
DEPRECATED HCO3 PLAS-SCNC: 29 MMOL/L (ref 22–29)
DRVVT SCREEN RATIO: 0.86
EOSINOPHIL # BLD AUTO: 0.2 10E3/UL (ref 0–0.7)
EOSINOPHIL NFR BLD AUTO: 2 %
ERYTHROCYTE [DISTWIDTH] IN BLOOD BY AUTOMATED COUNT: 15 % (ref 10–15)
ETHANOL UR QL SCN: ABNORMAL
GFR SERPL CREATININE-BSD FRML MDRD: >90 ML/MIN/1.73M2
GLUCOSE BLDC GLUCOMTR-MCNC: 118 MG/DL (ref 70–99)
GLUCOSE SERPL-MCNC: 117 MG/DL (ref 70–99)
GLUCOSE SERPL-MCNC: 160 MG/DL (ref 70–99)
GLUCOSE SERPL-MCNC: 173 MG/DL (ref 70–99)
HCT VFR BLD AUTO: 46.2 % (ref 40–53)
HGB BLD-MCNC: 14.8 G/DL (ref 13.3–17.7)
HOLD SPECIMEN: NORMAL
IMM GRANULOCYTES # BLD: 0.1 10E3/UL
IMM GRANULOCYTES NFR BLD: 1 %
LA PPP-IMP: NEGATIVE
LACTATE SERPL-SCNC: 1.2 MMOL/L (ref 0.7–2)
LACTATE SERPL-SCNC: 1.3 MMOL/L (ref 0.7–2)
LACTATE SERPL-SCNC: 1.9 MMOL/L (ref 0.7–2)
LACTATE SERPL-SCNC: 2.2 MMOL/L (ref 0.7–2)
LACTATE SERPL-SCNC: 2.6 MMOL/L (ref 0.7–2)
LUPUS INTERPRETATION: NORMAL
LYMPHOCYTES # BLD AUTO: 3.4 10E3/UL (ref 0.8–5.3)
LYMPHOCYTES NFR BLD AUTO: 24 %
MAGNESIUM SERPL-MCNC: 1.6 MG/DL (ref 1.7–2.3)
MAGNESIUM SERPL-MCNC: 1.9 MG/DL (ref 1.7–2.3)
MCH RBC QN AUTO: 30.3 PG (ref 26.5–33)
MCHC RBC AUTO-ENTMCNC: 32 G/DL (ref 31.5–36.5)
MCV RBC AUTO: 95 FL (ref 78–100)
MONOCYTES # BLD AUTO: 1.7 10E3/UL (ref 0–1.3)
MONOCYTES NFR BLD AUTO: 12 %
NEUTROPHILS # BLD AUTO: 8.9 10E3/UL (ref 1.6–8.3)
NEUTROPHILS NFR BLD AUTO: 60 %
NRBC # BLD AUTO: 0 10E3/UL
NRBC BLD AUTO-RTO: 0 /100
OPIATES UR QL SCN: ABNORMAL
PCP QUAL URINE (ROCHE): ABNORMAL
PLATELET # BLD AUTO: 166 10E3/UL (ref 150–450)
POTASSIUM SERPL-SCNC: 3.7 MMOL/L (ref 3.4–5.3)
POTASSIUM SERPL-SCNC: 3.7 MMOL/L (ref 3.4–5.3)
POTASSIUM SERPL-SCNC: 4.1 MMOL/L (ref 3.4–5.3)
PREALB SERPL IA-MCNC: 11 MG/DL (ref 15–45)
PROT SERPL-MCNC: 6.6 G/DL (ref 6.4–8.3)
PTT RATIO: 1.1
RBC # BLD AUTO: 4.89 10E6/UL (ref 4.4–5.9)
SODIUM SERPL-SCNC: 128 MMOL/L (ref 136–145)
SODIUM SERPL-SCNC: 130 MMOL/L (ref 136–145)
SODIUM SERPL-SCNC: 130 MMOL/L (ref 136–145)
THROMBIN TIME: 15.6 SECONDS (ref 13–19)
WBC # BLD AUTO: 14.4 10E3/UL (ref 4–11)

## 2023-03-21 PROCEDURE — 250N000013 HC RX MED GY IP 250 OP 250 PS 637: Performed by: STUDENT IN AN ORGANIZED HEALTH CARE EDUCATION/TRAINING PROGRAM

## 2023-03-21 PROCEDURE — 250N000013 HC RX MED GY IP 250 OP 250 PS 637: Performed by: INTERNAL MEDICINE

## 2023-03-21 PROCEDURE — 85025 COMPLETE CBC W/AUTO DIFF WBC: CPT | Performed by: INTERNAL MEDICINE

## 2023-03-21 PROCEDURE — 36415 COLL VENOUS BLD VENIPUNCTURE: CPT

## 2023-03-21 PROCEDURE — 80307 DRUG TEST PRSMV CHEM ANLYZR: CPT | Performed by: STUDENT IN AN ORGANIZED HEALTH CARE EDUCATION/TRAINING PROGRAM

## 2023-03-21 PROCEDURE — 250N000011 HC RX IP 250 OP 636: Performed by: STUDENT IN AN ORGANIZED HEALTH CARE EDUCATION/TRAINING PROGRAM

## 2023-03-21 PROCEDURE — 83605 ASSAY OF LACTIC ACID: CPT

## 2023-03-21 PROCEDURE — 71045 X-RAY EXAM CHEST 1 VIEW: CPT

## 2023-03-21 PROCEDURE — 99291 CRITICAL CARE FIRST HOUR: CPT | Mod: GC | Performed by: ANESTHESIOLOGY

## 2023-03-21 PROCEDURE — 80048 BASIC METABOLIC PNL TOTAL CA: CPT | Performed by: STUDENT IN AN ORGANIZED HEALTH CARE EDUCATION/TRAINING PROGRAM

## 2023-03-21 PROCEDURE — 83735 ASSAY OF MAGNESIUM: CPT | Performed by: STUDENT IN AN ORGANIZED HEALTH CARE EDUCATION/TRAINING PROGRAM

## 2023-03-21 PROCEDURE — 250N000013 HC RX MED GY IP 250 OP 250 PS 637

## 2023-03-21 PROCEDURE — 83605 ASSAY OF LACTIC ACID: CPT | Performed by: STUDENT IN AN ORGANIZED HEALTH CARE EDUCATION/TRAINING PROGRAM

## 2023-03-21 PROCEDURE — 80053 COMPREHEN METABOLIC PANEL: CPT | Performed by: STUDENT IN AN ORGANIZED HEALTH CARE EDUCATION/TRAINING PROGRAM

## 2023-03-21 PROCEDURE — 36415 COLL VENOUS BLD VENIPUNCTURE: CPT | Performed by: STUDENT IN AN ORGANIZED HEALTH CARE EDUCATION/TRAINING PROGRAM

## 2023-03-21 PROCEDURE — 250N000011 HC RX IP 250 OP 636: Performed by: INTERNAL MEDICINE

## 2023-03-21 PROCEDURE — 200N000002 HC R&B ICU UMMC

## 2023-03-21 PROCEDURE — 99291 CRITICAL CARE FIRST HOUR: CPT | Mod: 25 | Performed by: INTERNAL MEDICINE

## 2023-03-21 PROCEDURE — 71045 X-RAY EXAM CHEST 1 VIEW: CPT | Mod: 26 | Performed by: RADIOLOGY

## 2023-03-21 RX ORDER — POLYETHYLENE GLYCOL 3350 17 G/17G
238 POWDER, FOR SOLUTION ORAL ONCE
Status: COMPLETED | OUTPATIENT
Start: 2023-03-22 | End: 2023-03-22

## 2023-03-21 RX ORDER — ISOSORBIDE DINITRATE 10 MG/1
40 TABLET ORAL ONCE
Status: DISCONTINUED | OUTPATIENT
Start: 2023-03-22 | End: 2023-03-21

## 2023-03-21 RX ORDER — MAGNESIUM OXIDE 400 MG/1
400 TABLET ORAL EVERY 4 HOURS
Status: COMPLETED | OUTPATIENT
Start: 2023-03-21 | End: 2023-03-21

## 2023-03-21 RX ORDER — POLYETHYLENE GLYCOL 3350 17 G/17G
238 POWDER, FOR SOLUTION ORAL ONCE
Status: COMPLETED | OUTPATIENT
Start: 2023-03-21 | End: 2023-03-21

## 2023-03-21 RX ORDER — ISOSORBIDE DINITRATE 30 MG/1
60 TABLET ORAL ONCE
Status: DISCONTINUED | OUTPATIENT
Start: 2023-03-22 | End: 2023-03-21

## 2023-03-21 RX ORDER — ISOSORBIDE DINITRATE 10 MG/1
20 TABLET ORAL ONCE
Status: DISCONTINUED | OUTPATIENT
Start: 2023-03-21 | End: 2023-03-21

## 2023-03-21 RX ORDER — FUROSEMIDE 10 MG/ML
80 INJECTION INTRAMUSCULAR; INTRAVENOUS ONCE
Status: COMPLETED | OUTPATIENT
Start: 2023-03-21 | End: 2023-03-21

## 2023-03-21 RX ORDER — POTASSIUM CHLORIDE 750 MG/1
20 TABLET, EXTENDED RELEASE ORAL ONCE
Status: COMPLETED | OUTPATIENT
Start: 2023-03-21 | End: 2023-03-21

## 2023-03-21 RX ORDER — ISOSORBIDE DINITRATE 10 MG/1
10 TABLET ORAL ONCE
Status: COMPLETED | OUTPATIENT
Start: 2023-03-21 | End: 2023-03-21

## 2023-03-21 RX ORDER — NICOTINE 21 MG/24HR
1 PATCH, TRANSDERMAL 24 HOURS TRANSDERMAL DAILY
Status: DISCONTINUED | OUTPATIENT
Start: 2023-03-21 | End: 2023-03-22

## 2023-03-21 RX ORDER — MAGNESIUM SULFATE HEPTAHYDRATE 40 MG/ML
2 INJECTION, SOLUTION INTRAVENOUS ONCE
Status: COMPLETED | OUTPATIENT
Start: 2023-03-21 | End: 2023-03-21

## 2023-03-21 RX ADMIN — MILRINONE LACTATE IN DEXTROSE 0.12 MCG/KG/MIN: 200 INJECTION, SOLUTION INTRAVENOUS at 14:28

## 2023-03-21 RX ADMIN — DOXYCYCLINE HYCLATE 100 MG: 100 CAPSULE ORAL at 08:13

## 2023-03-21 RX ADMIN — HYDRALAZINE HYDROCHLORIDE 100 MG: 100 TABLET, FILM COATED ORAL at 17:23

## 2023-03-21 RX ADMIN — LIDOCAINE PATCH 4% 2 PATCH: 40 PATCH TOPICAL at 08:13

## 2023-03-21 RX ADMIN — OXYCODONE HYDROCHLORIDE 5 MG: 5 TABLET ORAL at 03:37

## 2023-03-21 RX ADMIN — OXYCODONE HYDROCHLORIDE 5 MG: 5 TABLET ORAL at 20:54

## 2023-03-21 RX ADMIN — OXYCODONE HYDROCHLORIDE 5 MG: 5 TABLET ORAL at 16:26

## 2023-03-21 RX ADMIN — OXYCODONE HYDROCHLORIDE 5 MG: 5 TABLET ORAL at 11:59

## 2023-03-21 RX ADMIN — MAGNESIUM OXIDE TAB 400 MG (241.3 MG ELEMENTAL MG) 400 MG: 400 (241.3 MG) TAB at 05:57

## 2023-03-21 RX ADMIN — FUROSEMIDE 80 MG: 10 INJECTION, SOLUTION INTRAVENOUS at 15:09

## 2023-03-21 RX ADMIN — HYDRALAZINE HYDROCHLORIDE 100 MG: 100 TABLET, FILM COATED ORAL at 05:57

## 2023-03-21 RX ADMIN — DIGOXIN 125 MCG: 125 TABLET ORAL at 08:13

## 2023-03-21 RX ADMIN — CEFTRIAXONE SODIUM 2 G: 2 INJECTION, POWDER, FOR SOLUTION INTRAMUSCULAR; INTRAVENOUS at 10:56

## 2023-03-21 RX ADMIN — POLYETHYLENE GLYCOL 3350 238 G: 17 POWDER, FOR SOLUTION ORAL at 16:26

## 2023-03-21 RX ADMIN — ISOSORBIDE DINITRATE 10 MG: 10 TABLET ORAL at 17:23

## 2023-03-21 RX ADMIN — MAGNESIUM OXIDE TAB 400 MG (241.3 MG ELEMENTAL MG) 400 MG: 400 (241.3 MG) TAB at 11:59

## 2023-03-21 RX ADMIN — HYDRALAZINE HYDROCHLORIDE 100 MG: 100 TABLET, FILM COATED ORAL at 11:59

## 2023-03-21 RX ADMIN — OXYCODONE HYDROCHLORIDE 5 MG: 5 TABLET ORAL at 08:13

## 2023-03-21 RX ADMIN — MAGNESIUM SULFATE IN WATER 2 G: 40 INJECTION, SOLUTION INTRAVENOUS at 19:05

## 2023-03-21 RX ADMIN — POTASSIUM CHLORIDE 20 MEQ: 750 TABLET, EXTENDED RELEASE ORAL at 05:57

## 2023-03-21 ASSESSMENT — ACTIVITIES OF DAILY LIVING (ADL)
ADLS_ACUITY_SCORE: 20

## 2023-03-21 NOTE — PROGRESS NOTES
Spoke to Cards 2 about lactic 2.2, no intervention needed at this time.    Cheyenne Martinez RN on 3/21/2023 at 10:46 AM

## 2023-03-21 NOTE — PROGRESS NOTES
Pre-VAD Social Work Services Progress Note      Date of Initial Social Work Evaluation: 3/17/2023  Collaborated with: Akshat    Data: Akshat remains in ICU undergoing LVAD evaluation. He reports ongoing fatigue and inability to get any sleep. He reports ongoing frustration due to his situation. He also continues to endorse ongoing mistrust of the health care team due to feelings that he was misdiagnosed 5 years ago with pneumonia instead of heart failure. He seems to feel frustrated with the need for neuropsych testing citing exhaustion, but appears to be quite anxious about it as well. He expresses ongoing worry over his family's ability to cope with his situation, stating that his son ended up in the ED a couple of nights ago after having a panic attack. He reports this was related to being at home and reminding him of the situation all over again where the son administered CPR. Patient is asking for psychological support for himself as well as for his Son and Dtr.  Intervention: Asked the medical team for health psych consult. Met with Akshat this morning and offered emotional support and encouragement. Explained need and process of evaluation and normalized feelings of mistrust and frustration.  Assessment: Akshat and family will benefit from ongoing psychotherapy support as able. Will benefit from ongoing psychosocial support. Patient verbalized appreciation toward the VAD patient volunteer visit.  Education provided by SW: Ongoing SW availability, as well as support group information  Plan:    Discharge Plans in Progress: TBD    Barriers to d/c plan: Needing LVAD this hospital stay    Follow up Plan: SW will continue to follow for ongoing psychosocial support pre and post-VAD. Will continue to look for supportive resources for patient and family.

## 2023-03-21 NOTE — PLAN OF CARE
ICU End of Shift Summary. See flowsheets for vital signs and detailed assessment.    Changes this shift: No acute events overnight. VSS. Performing ADLs independently with minimal assistance. Unable to draw labs from R sided IJ, no blood return in any lumen. Cards resident notified of this finding, PA waveform present but no blood return noted in catheter, OK given by night resident for no NITA numbers at 10PM and 4AM. PA and CVP numbers obtained and charted. Pt denies nausea, dizziness, SOB, chest pain, or any other sx's. Potassium and Mag replaced this shift.     Plan: LVAD workup. Colonoscopy this week.   Goal Outcome Evaluation:  Overall Patient Progress: improvingOverall Patient Progress: improving    Outcome Evaluation: Continue LVAD workup. VSS. Milrinone gtt. Pain managed with scheduled and PRNs. Performing ADLs independently with minimal assistance. Denies SOB, lightheadedness, nausea, chest pain, or any other sx's.

## 2023-03-21 NOTE — PROGRESS NOTES
[facility]    Cardiology Progress Note- Cardiology      Date of Admission:  3/15/2023     Assessment & Plan: SL   Akshatjaimee Fragoso is a 57 year old man with past medical history of HFrEF (EF 10% 5/2022) 2/2 NICM s/p ICD, tobacco use disorder, marijuana use, HTN, lumbar radiculopathy, CKD who presents after being found down at home. Found to have had a prolonged episode of VT with concern for cardiac arrest s/p ROSC in the field.    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 Presented with out of hospital cardiac arrest with VT for 5 min 43 seconds of VT at 199 bpm, with spontaneous conversion to sinus rhythm without defibrillation on device interrogation on 3/ 15/2023. He had  A run of  VT while here, with hypotension on 3/16/18. Did not tolerate amiodarone gtt, due to hypotension.  Has been in sinus since then. Has been managed here with diuresis and afterload reducing agents with hemodynamic monitoring. Patient started workup at [facility] for LVAD as DT (initially evaluated for transplant though patient having trouble quitting marijuana/tobacco use). Now has started work up for LVAD  Here.     Recent studies:  - Coronary angiogram 2/2017 minimal  nonobstructive coronary disease  - Echocardiogram 3/16/23: LVIDd 7.7, EF 5-10%  - CPX: Peak VO2: 13.3 ml/kg/min 3/8/21, with RER of 1.00      DATE MAP CVP PAP PCWP Carlos CO Carlos CI SVR MVo2 Therapies   3/18 72 4 64/34 22 4.4 2.4  72 Nipride  1.15   3/19 77 8 58/34 26 4.6 2.5 1200 72 Nipride, hydralazine   3/20 84 13 62/46/45 40 3.7 2 1315 64 Hydralazine       - VT secondary prevention while inpatient: held amiodarone due to intolerance with hypotension  - Continue PTA digoxin 125 mcg  - Inotropes: start milrinone 0.125  - Volume status: hypervolemic, lasix 80 mg IV today  - BB: Hold PTA carvedilol 12.5 mg BID  - ACEi/ARB/ARNI: hold PTA lisinopril 5 mg BID  - afterload reduction: hydralazine 100mg q6h  - MRA:  PTA spironolactone 12.5 mg BID  - SGLT2i: none PTA  - SCD ppx/BiV pacer: ICD, reprogrammed device to provide ATP burst at VT threshold  - discontinued PTA aspirin as there is no clear indication  - Fluid restrict, daily I/O, daily weights, lytes checks   Started LVAD/Heart transplant eval   -- CT head- normal   -- CT chest-abdomen and pelvis:  Basilar atelectasis with faint GGO. Colonic diverticulosis without diverticulitis  -- Upper E xtremity doppler: pending   -- Lower ext arterial doppler: Normal     -- CT dental: done periodontal disease, no evidence of cellulitis or abscess  -- US Carotid  -- US MISAEL bilateral: done  -- SW consult: done  -- Neuropsych consult: done  -- GI: inpatient colonoscopy 3/22  -- Heme/onc: pending   -- Palliative care consult, recs appreciated  -- Cardiothoracic consult: pending    -- Dental consult: carious lesions on #14 and #2 teeth, follow up with existing dentist, no need for extraction prior to LVAD  -- Nutrition consult : done  -- PT/OT     -- 6 min walk test: pending     # Documented history of atrial fibrillation  Currently in sinus, unable to see in notes from [facility] where this was found. Not on anticoagulation  - Telemetry here     Pulmonary  # Tobacco use disorder  Has cut  "back it appears to a few cigarettes daily, about 40 pack year history     # Marijuana use  Uses for anxiety        Gastrointestinal, Nutrition  #no issues     Renal, Electrolytes  # CKD  Likely in setting of cardiorenal pathology over time. Cr baseline   - Monitor  Hypokalemia: replace  Hypomagnesemia : replace  Hypocalcemia: replace     Infectious Disease  # Leukocytosis  # C/f CAP  Increasing WBC, with cough, chest pain, and purulent sputum production and GGO on chest CT  c/f CAP.    -ceftriaxone (3/18-*)  -doxycycline (3/18-*)     Hematology  #no issues      Endocrinology  #no issues     Integumentary:  #no skin issues    Diet: Fluid restriction 2000 ML FLUID  2 Gram Sodium Diet    DVT Prophylaxis: Pneumatic Compression Devices  Adkins Catheter: Not present  Cardiac Monitoring: ACTIVE order. Indication: Tachyarrhythmias, acute (48 hours)  Code Status: Full Code          Clinically Significant Risk Factors          # Hypocalcemia: Lowest iCa = 4.1 mg/dL in last 2 days, will monitor and replace as appropriate   # Hypomagnesemia: Lowest Mg = 1.6 mg/dL in last 2 days, will replace as needed              # Overweight: Estimated body mass index is 26 kg/m  as calculated from the following:    Height as of this encounter: 1.702 m (5' 7\").    Weight as of this encounter: 75.3 kg (166 lb 0.1 oz).          Disposition Plan   Post potential advanced therapies, >3 days    Entered: Phil Hoffman MD 03/21/2023, 9:15 AM   The patient's care was discussed with the Attending Physician, Dr. Aguiar.      Phil Hoffman MD  Internal Medicine PGY3  [facility]  ______________________________________________________________________    Interval History   Feeling better today since milrinone started, no longer nauseous. Wondering if can have portable monitor so he can ambulate to the bathroom.    Physical Exam   Vital Signs: Temp: 96.9  F (36.1  C) Temp src: Axillary BP: 97/64 Pulse: 114   " Resp: 18 SpO2: 97 % O2 Device: Nasal cannula Oxygen Delivery: 2 LPM  Weight: 166 lbs .1 oz  Temp: 96.9  F (36.1  C) Temp  Min: 96.8  F (36  C)  Max: 99.6  F (37.6  C)  Resp: 18 Resp  Min: 14  Max: 20  SpO2: 97 % SpO2  Min: 90 %  Max: 98 %  Pulse: 114 Pulse  Min: 95  Max: 131    No data recorded  BP: 97/64 Systolic (24hrs), Av , Min:80 , Max:108   Diastolic (24hrs), Av, Min:53, Max:82    GEN: comfortable, sleeping initially upon entry in the room  HEENT: no icterus  CV: RRR, S3   CHEST: CTAB  ABD: soft, NT/ND, NABS  NEURO: AA&Ox3, fluent/appropriate, motor grossly nonfocal  PSYCH: cooperative, affect appropriate    Medical Decision Making       Please see A&P for additional details of medical decision making.      Data: reviewed over last 24 hours

## 2023-03-21 NOTE — PLAN OF CARE
OOB to bathroom several times, otherwise needs encouragement for getting up. Refused PT/OT today, will try tomorrow. Baldwin pulled d/t no blood return, MAC in place for Milrinone drip. Per Cards okay to go over 2L FR with bowel prep.     Neuro: AOx4, frustrated with lack of sleep, cooperative. Fatigued. PRN Oxy given x 3 for rib/chest pain d/t compressions.   CV: MAP goals met, Milrinone increased to 0.25 this evening. Afebrile.   Pulm: O2 sats in 90s on 2L NC. Prefers to keep on for comfort.   GI/: Clear liquid diet, NPO at midnight. 2/4L bowel prep completed.   Skin: Various bruising.   Labs: LA increased, cards aware. K+/Mag replaced in AM, Mag replaced this evening.   Lines/gtt: Baldwin removed, MAC capped.     Plan: To OR tomorrow for colonoscopy. LVAD possibly Thursday. Continue to encourage activity as able. Promote sleep cycle.     Goal Outcome Evaluation:      Plan of Care Reviewed With: patient, spouse    Overall Patient Progress: improvingOverall Patient Progress: improving    Outcome Evaluation: Continue LVAD workup. VSS. Up ad skyla. Needs encouragement to ambulate.

## 2023-03-22 ENCOUNTER — TEAM CONFERENCE (OUTPATIENT)
Dept: CARDIOLOGY | Facility: CLINIC | Age: 58
End: 2023-03-22
Payer: COMMERCIAL

## 2023-03-22 ENCOUNTER — COMMITTEE REVIEW (OUTPATIENT)
Dept: TRANSPLANT | Facility: CLINIC | Age: 58
End: 2023-03-22
Payer: COMMERCIAL

## 2023-03-22 ENCOUNTER — ANESTHESIA EVENT (OUTPATIENT)
Dept: SURGERY | Facility: CLINIC | Age: 58
DRG: 001 | End: 2023-03-22
Payer: COMMERCIAL

## 2023-03-22 LAB
ANION GAP SERPL CALCULATED.3IONS-SCNC: 10 MMOL/L (ref 7–15)
ANION GAP SERPL CALCULATED.3IONS-SCNC: 14 MMOL/L (ref 7–15)
BASE EXCESS BLDV CALC-SCNC: 10.8 MMOL/L (ref -7.7–1.9)
BASOPHILS # BLD AUTO: 0.1 10E3/UL (ref 0–0.2)
BASOPHILS # BLD AUTO: 0.1 10E3/UL (ref 0–0.2)
BASOPHILS NFR BLD AUTO: 1 %
BASOPHILS NFR BLD AUTO: 1 %
BUN SERPL-MCNC: 17.3 MG/DL (ref 6–20)
BUN SERPL-MCNC: 18.6 MG/DL (ref 6–20)
CALCIUM SERPL-MCNC: 9 MG/DL (ref 8.6–10)
CALCIUM SERPL-MCNC: 9 MG/DL (ref 8.6–10)
CHLORIDE SERPL-SCNC: 86 MMOL/L (ref 98–107)
CHLORIDE SERPL-SCNC: 89 MMOL/L (ref 98–107)
COLONOSCOPY: NORMAL
CREAT SERPL-MCNC: 0.84 MG/DL (ref 0.67–1.17)
CREAT SERPL-MCNC: 0.95 MG/DL (ref 0.67–1.17)
DEPRECATED HCO3 PLAS-SCNC: 29 MMOL/L (ref 22–29)
DEPRECATED HCO3 PLAS-SCNC: 32 MMOL/L (ref 22–29)
EOSINOPHIL # BLD AUTO: 0.3 10E3/UL (ref 0–0.7)
EOSINOPHIL # BLD AUTO: 0.4 10E3/UL (ref 0–0.7)
EOSINOPHIL NFR BLD AUTO: 2 %
EOSINOPHIL NFR BLD AUTO: 2 %
ERYTHROCYTE [DISTWIDTH] IN BLOOD BY AUTOMATED COUNT: 14.7 % (ref 10–15)
ERYTHROCYTE [DISTWIDTH] IN BLOOD BY AUTOMATED COUNT: 14.8 % (ref 10–15)
GFR SERPL CREATININE-BSD FRML MDRD: >90 ML/MIN/1.73M2
GFR SERPL CREATININE-BSD FRML MDRD: >90 ML/MIN/1.73M2
GLUCOSE SERPL-MCNC: 108 MG/DL (ref 70–99)
GLUCOSE SERPL-MCNC: 98 MG/DL (ref 70–99)
HCO3 BLDV-SCNC: 37 MMOL/L (ref 21–28)
HCT VFR BLD AUTO: 43.3 % (ref 40–53)
HCT VFR BLD AUTO: 43.9 % (ref 40–53)
HGB BLD-MCNC: 14 G/DL (ref 13.3–17.7)
HGB BLD-MCNC: 14 G/DL (ref 13.3–17.7)
HGB BLD-MCNC: 14.1 G/DL (ref 13.3–17.7)
HGB BLD-MCNC: 14.3 G/DL (ref 13.3–17.7)
IMM GRANULOCYTES # BLD: 0.1 10E3/UL
IMM GRANULOCYTES # BLD: 0.1 10E3/UL
IMM GRANULOCYTES NFR BLD: 1 %
IMM GRANULOCYTES NFR BLD: 1 %
LACTATE SERPL-SCNC: 1 MMOL/L (ref 0.7–2)
LACTATE SERPL-SCNC: 1 MMOL/L (ref 0.7–2)
LACTATE SERPL-SCNC: 2 MMOL/L (ref 0.7–2)
LYMPHOCYTES # BLD AUTO: 2.2 10E3/UL (ref 0.8–5.3)
LYMPHOCYTES # BLD AUTO: 3.2 10E3/UL (ref 0.8–5.3)
LYMPHOCYTES NFR BLD AUTO: 15 %
LYMPHOCYTES NFR BLD AUTO: 22 %
MAGNESIUM SERPL-MCNC: 1.8 MG/DL (ref 1.7–2.3)
MAGNESIUM SERPL-MCNC: 2.6 MG/DL (ref 1.7–2.3)
MCH RBC QN AUTO: 30.4 PG (ref 26.5–33)
MCH RBC QN AUTO: 30.8 PG (ref 26.5–33)
MCHC RBC AUTO-ENTMCNC: 32.6 G/DL (ref 31.5–36.5)
MCHC RBC AUTO-ENTMCNC: 32.6 G/DL (ref 31.5–36.5)
MCV RBC AUTO: 93 FL (ref 78–100)
MCV RBC AUTO: 95 FL (ref 78–100)
MONOCYTES # BLD AUTO: 1.9 10E3/UL (ref 0–1.3)
MONOCYTES # BLD AUTO: 1.9 10E3/UL (ref 0–1.3)
MONOCYTES NFR BLD AUTO: 13 %
MONOCYTES NFR BLD AUTO: 13 %
NEUTROPHILS # BLD AUTO: 10.3 10E3/UL (ref 1.6–8.3)
NEUTROPHILS # BLD AUTO: 9.3 10E3/UL (ref 1.6–8.3)
NEUTROPHILS NFR BLD AUTO: 61 %
NEUTROPHILS NFR BLD AUTO: 68 %
NRBC # BLD AUTO: 0 10E3/UL
NRBC # BLD AUTO: 0 10E3/UL
NRBC BLD AUTO-RTO: 0 /100
NRBC BLD AUTO-RTO: 0 /100
O2/TOTAL GAS SETTING VFR VENT: 2 %
OXYHGB MFR BLDV: 70 % (ref 92–100)
OXYHGB MFR BLDV: 72 % (ref 70–75)
OXYHGB MFR BLDV: 72 % (ref 92–100)
PCO2 BLDV: 53 MM HG (ref 40–50)
PH BLDV: 7.45 [PH] (ref 7.32–7.43)
PHOSPHATE SERPL-MCNC: 4.1 MG/DL (ref 2.5–4.5)
PLATELET # BLD AUTO: 186 10E3/UL (ref 150–450)
PLATELET # BLD AUTO: 204 10E3/UL (ref 150–450)
PLATELET # BLD AUTO: ABNORMAL 10*3/UL
PO2 BLDV: 40 MM HG (ref 25–47)
POTASSIUM SERPL-SCNC: 3.5 MMOL/L (ref 3.4–5.3)
POTASSIUM SERPL-SCNC: 4 MMOL/L (ref 3.4–5.3)
RBC # BLD AUTO: 4.64 10E6/UL (ref 4.4–5.9)
RBC # BLD AUTO: 4.64 10E6/UL (ref 4.4–5.9)
SODIUM SERPL-SCNC: 129 MMOL/L (ref 136–145)
SODIUM SERPL-SCNC: 131 MMOL/L (ref 136–145)
WBC # BLD AUTO: 14.8 10E3/UL (ref 4–11)
WBC # BLD AUTO: 14.9 10E3/UL (ref 4–11)

## 2023-03-22 PROCEDURE — 99152 MOD SED SAME PHYS/QHP 5/>YRS: CPT | Mod: GC | Performed by: INTERNAL MEDICINE

## 2023-03-22 PROCEDURE — 250N000011 HC RX IP 250 OP 636

## 2023-03-22 PROCEDURE — 250N000013 HC RX MED GY IP 250 OP 250 PS 637

## 2023-03-22 PROCEDURE — 250N000011 HC RX IP 250 OP 636: Performed by: INTERNAL MEDICINE

## 2023-03-22 PROCEDURE — 250N000013 HC RX MED GY IP 250 OP 250 PS 637: Performed by: INTERNAL MEDICINE

## 2023-03-22 PROCEDURE — 82810 BLOOD GASES O2 SAT ONLY: CPT

## 2023-03-22 PROCEDURE — 999N000075 HC STATISTIC IABP MONITORING

## 2023-03-22 PROCEDURE — 5A02210 ASSISTANCE WITH CARDIAC OUTPUT USING BALLOON PUMP, CONTINUOUS: ICD-10-PCS | Performed by: INTERNAL MEDICINE

## 2023-03-22 PROCEDURE — 250N000009 HC RX 250: Performed by: INTERNAL MEDICINE

## 2023-03-22 PROCEDURE — 80048 BASIC METABOLIC PNL TOTAL CA: CPT | Performed by: STUDENT IN AN ORGANIZED HEALTH CARE EDUCATION/TRAINING PROGRAM

## 2023-03-22 PROCEDURE — 83605 ASSAY OF LACTIC ACID: CPT

## 2023-03-22 PROCEDURE — 250N000011 HC RX IP 250 OP 636: Performed by: STUDENT IN AN ORGANIZED HEALTH CARE EDUCATION/TRAINING PROGRAM

## 2023-03-22 PROCEDURE — 250N000013 HC RX MED GY IP 250 OP 250 PS 637: Performed by: STUDENT IN AN ORGANIZED HEALTH CARE EDUCATION/TRAINING PROGRAM

## 2023-03-22 PROCEDURE — 83605 ASSAY OF LACTIC ACID: CPT | Performed by: STUDENT IN AN ORGANIZED HEALTH CARE EDUCATION/TRAINING PROGRAM

## 2023-03-22 PROCEDURE — 99291 CRITICAL CARE FIRST HOUR: CPT | Mod: 57 | Performed by: INTERNAL MEDICINE

## 2023-03-22 PROCEDURE — 0DJD8ZZ INSPECTION OF LOWER INTESTINAL TRACT, VIA NATURAL OR ARTIFICIAL OPENING ENDOSCOPIC: ICD-10-PCS | Performed by: INTERNAL MEDICINE

## 2023-03-22 PROCEDURE — 85049 AUTOMATED PLATELET COUNT: CPT | Performed by: INTERNAL MEDICINE

## 2023-03-22 PROCEDURE — 272N000057 HC CATH BALLOON IABP

## 2023-03-22 PROCEDURE — 83735 ASSAY OF MAGNESIUM: CPT | Performed by: STUDENT IN AN ORGANIZED HEALTH CARE EDUCATION/TRAINING PROGRAM

## 2023-03-22 PROCEDURE — 82805 BLOOD GASES W/O2 SATURATION: CPT | Performed by: STUDENT IN AN ORGANIZED HEALTH CARE EDUCATION/TRAINING PROGRAM

## 2023-03-22 PROCEDURE — 4A023N6 MEASUREMENT OF CARDIAC SAMPLING AND PRESSURE, RIGHT HEART, PERCUTANEOUS APPROACH: ICD-10-PCS | Performed by: INTERNAL MEDICINE

## 2023-03-22 PROCEDURE — 999N000077 HC STATISTIC INSERT IABP

## 2023-03-22 PROCEDURE — 33967 INSERT I-AORT PERCUT DEVICE: CPT | Performed by: INTERNAL MEDICINE

## 2023-03-22 PROCEDURE — 99153 MOD SED SAME PHYS/QHP EA: CPT | Performed by: INTERNAL MEDICINE

## 2023-03-22 PROCEDURE — 93451 RIGHT HEART CATH: CPT | Mod: 26 | Performed by: INTERNAL MEDICINE

## 2023-03-22 PROCEDURE — 85018 HEMOGLOBIN: CPT

## 2023-03-22 PROCEDURE — 99152 MOD SED SAME PHYS/QHP 5/>YRS: CPT | Performed by: INTERNAL MEDICINE

## 2023-03-22 PROCEDURE — 84100 ASSAY OF PHOSPHORUS: CPT | Performed by: INTERNAL MEDICINE

## 2023-03-22 PROCEDURE — 85025 COMPLETE CBC W/AUTO DIFF WBC: CPT | Performed by: STUDENT IN AN ORGANIZED HEALTH CARE EDUCATION/TRAINING PROGRAM

## 2023-03-22 PROCEDURE — 200N000002 HC R&B ICU UMMC

## 2023-03-22 PROCEDURE — 84132 ASSAY OF SERUM POTASSIUM: CPT | Performed by: INTERNAL MEDICINE

## 2023-03-22 PROCEDURE — C1894 INTRO/SHEATH, NON-LASER: HCPCS | Performed by: INTERNAL MEDICINE

## 2023-03-22 PROCEDURE — 99222 1ST HOSP IP/OBS MODERATE 55: CPT | Mod: 57 | Performed by: STUDENT IN AN ORGANIZED HEALTH CARE EDUCATION/TRAINING PROGRAM

## 2023-03-22 PROCEDURE — 93451 RIGHT HEART CATH: CPT | Performed by: INTERNAL MEDICINE

## 2023-03-22 PROCEDURE — 272N000001 HC OR GENERAL SUPPLY STERILE: Performed by: INTERNAL MEDICINE

## 2023-03-22 PROCEDURE — 83735 ASSAY OF MAGNESIUM: CPT | Performed by: INTERNAL MEDICINE

## 2023-03-22 PROCEDURE — 45378 DIAGNOSTIC COLONOSCOPY: CPT | Performed by: INTERNAL MEDICINE

## 2023-03-22 PROCEDURE — 33967 INSERT I-AORT PERCUT DEVICE: CPT | Mod: GC | Performed by: INTERNAL MEDICINE

## 2023-03-22 RX ORDER — EPINEPHRINE 1 MG/ML
0.1 INJECTION, SOLUTION, CONCENTRATE INTRAVENOUS
Status: DISCONTINUED | OUTPATIENT
Start: 2023-03-22 | End: 2023-03-22 | Stop reason: HOSPADM

## 2023-03-22 RX ORDER — NALOXONE HYDROCHLORIDE 0.4 MG/ML
0.2 INJECTION, SOLUTION INTRAMUSCULAR; INTRAVENOUS; SUBCUTANEOUS
Status: DISCONTINUED | OUTPATIENT
Start: 2023-03-22 | End: 2023-03-22 | Stop reason: HOSPADM

## 2023-03-22 RX ORDER — ATROPINE SULFATE 0.1 MG/ML
1 INJECTION INTRAVENOUS
Status: DISCONTINUED | OUTPATIENT
Start: 2023-03-22 | End: 2023-03-22 | Stop reason: HOSPADM

## 2023-03-22 RX ORDER — FENTANYL CITRATE 50 UG/ML
50-100 INJECTION, SOLUTION INTRAMUSCULAR; INTRAVENOUS EVERY 5 MIN PRN
Status: DISCONTINUED | OUTPATIENT
Start: 2023-03-22 | End: 2023-03-22 | Stop reason: HOSPADM

## 2023-03-22 RX ORDER — FLUMAZENIL 0.1 MG/ML
0.2 INJECTION, SOLUTION INTRAVENOUS
Status: DISCONTINUED | OUTPATIENT
Start: 2023-03-22 | End: 2023-03-22 | Stop reason: HOSPADM

## 2023-03-22 RX ORDER — SIMETHICONE 40MG/0.6ML
133 SUSPENSION, DROPS(FINAL DOSAGE FORM)(ML) ORAL
Status: DISCONTINUED | OUTPATIENT
Start: 2023-03-22 | End: 2023-03-22 | Stop reason: HOSPADM

## 2023-03-22 RX ORDER — FUROSEMIDE 10 MG/ML
120 INJECTION INTRAMUSCULAR; INTRAVENOUS ONCE
Status: COMPLETED | OUTPATIENT
Start: 2023-03-22 | End: 2023-03-22

## 2023-03-22 RX ORDER — POLYETHYLENE GLYCOL 3350 17 G/17G
238 POWDER, FOR SOLUTION ORAL ONCE
Status: DISCONTINUED | OUTPATIENT
Start: 2023-03-22 | End: 2023-03-22

## 2023-03-22 RX ORDER — FENTANYL CITRATE 50 UG/ML
INJECTION, SOLUTION INTRAMUSCULAR; INTRAVENOUS
Status: COMPLETED
Start: 2023-03-22 | End: 2023-03-22

## 2023-03-22 RX ORDER — MAGNESIUM SULFATE HEPTAHYDRATE 40 MG/ML
2 INJECTION, SOLUTION INTRAVENOUS ONCE
Status: COMPLETED | OUTPATIENT
Start: 2023-03-22 | End: 2023-03-22

## 2023-03-22 RX ORDER — DIPHENHYDRAMINE HYDROCHLORIDE 50 MG/ML
INJECTION INTRAMUSCULAR; INTRAVENOUS
Status: COMPLETED
Start: 2023-03-22 | End: 2023-03-22

## 2023-03-22 RX ORDER — POLYETHYLENE GLYCOL 3350 17 G/17G
238 POWDER, FOR SOLUTION ORAL ONCE
Status: COMPLETED | OUTPATIENT
Start: 2023-03-22 | End: 2023-03-22

## 2023-03-22 RX ORDER — DIPHENHYDRAMINE HYDROCHLORIDE 50 MG/ML
25-50 INJECTION INTRAMUSCULAR; INTRAVENOUS
Status: DISCONTINUED | OUTPATIENT
Start: 2023-03-22 | End: 2023-03-22

## 2023-03-22 RX ORDER — FENTANYL CITRATE 50 UG/ML
INJECTION, SOLUTION INTRAMUSCULAR; INTRAVENOUS
Status: DISCONTINUED | OUTPATIENT
Start: 2023-03-22 | End: 2023-03-22 | Stop reason: HOSPADM

## 2023-03-22 RX ORDER — NALOXONE HYDROCHLORIDE 0.4 MG/ML
0.4 INJECTION, SOLUTION INTRAMUSCULAR; INTRAVENOUS; SUBCUTANEOUS
Status: DISCONTINUED | OUTPATIENT
Start: 2023-03-22 | End: 2023-03-22 | Stop reason: HOSPADM

## 2023-03-22 RX ORDER — POTASSIUM CHLORIDE 750 MG/1
20 TABLET, EXTENDED RELEASE ORAL ONCE
Status: COMPLETED | OUTPATIENT
Start: 2023-03-22 | End: 2023-03-22

## 2023-03-22 RX ADMIN — HYDRALAZINE HYDROCHLORIDE 100 MG: 100 TABLET, FILM COATED ORAL at 06:42

## 2023-03-22 RX ADMIN — POTASSIUM CHLORIDE 20 MEQ: 750 TABLET, EXTENDED RELEASE ORAL at 19:00

## 2023-03-22 RX ADMIN — OXYCODONE HYDROCHLORIDE 5 MG: 5 TABLET ORAL at 08:16

## 2023-03-22 RX ADMIN — MIDAZOLAM 2 MG: 1 INJECTION INTRAMUSCULAR; INTRAVENOUS at 12:00

## 2023-03-22 RX ADMIN — FENTANYL CITRATE 100 MCG: 50 INJECTION, SOLUTION INTRAMUSCULAR; INTRAVENOUS at 12:00

## 2023-03-22 RX ADMIN — MIDAZOLAM 2 MG: 1 INJECTION INTRAMUSCULAR; INTRAVENOUS at 12:15

## 2023-03-22 RX ADMIN — POLYETHYLENE GLYCOL 3350 238 G: 17 POWDER, FOR SOLUTION ORAL at 05:14

## 2023-03-22 RX ADMIN — DOXYCYCLINE HYCLATE 100 MG: 100 CAPSULE ORAL at 20:03

## 2023-03-22 RX ADMIN — DIGOXIN 125 MCG: 125 TABLET ORAL at 08:16

## 2023-03-22 RX ADMIN — CEFTRIAXONE SODIUM 2 G: 2 INJECTION, POWDER, FOR SOLUTION INTRAMUSCULAR; INTRAVENOUS at 11:06

## 2023-03-22 RX ADMIN — MILRINONE LACTATE IN DEXTROSE 0.25 MCG/KG/MIN: 200 INJECTION, SOLUTION INTRAVENOUS at 08:55

## 2023-03-22 RX ADMIN — DIPHENHYDRAMINE HYDROCHLORIDE 25 MG: 50 INJECTION, SOLUTION INTRAMUSCULAR; INTRAVENOUS at 12:15

## 2023-03-22 RX ADMIN — MILRINONE LACTATE IN DEXTROSE 0.25 MCG/KG/MIN: 200 INJECTION, SOLUTION INTRAVENOUS at 22:25

## 2023-03-22 RX ADMIN — MIDAZOLAM 1 MG: 1 INJECTION INTRAMUSCULAR; INTRAVENOUS at 12:30

## 2023-03-22 RX ADMIN — OXYCODONE HYDROCHLORIDE 5 MG: 5 TABLET ORAL at 19:35

## 2023-03-22 RX ADMIN — OXYCODONE HYDROCHLORIDE 5 MG: 5 TABLET ORAL at 13:43

## 2023-03-22 RX ADMIN — HYDRALAZINE HYDROCHLORIDE 100 MG: 100 TABLET, FILM COATED ORAL at 23:58

## 2023-03-22 RX ADMIN — FENTANYL CITRATE 100 MCG: 50 INJECTION, SOLUTION INTRAMUSCULAR; INTRAVENOUS at 12:15

## 2023-03-22 RX ADMIN — MAGNESIUM SULFATE IN WATER 2 G: 40 INJECTION, SOLUTION INTRAVENOUS at 05:14

## 2023-03-22 RX ADMIN — OXYCODONE HYDROCHLORIDE 5 MG: 5 TABLET ORAL at 04:26

## 2023-03-22 RX ADMIN — FUROSEMIDE 120 MG: 10 INJECTION, SOLUTION INTRAVENOUS at 19:55

## 2023-03-22 RX ADMIN — HYDRALAZINE HYDROCHLORIDE 100 MG: 100 TABLET, FILM COATED ORAL at 13:43

## 2023-03-22 RX ADMIN — HYDRALAZINE HYDROCHLORIDE 100 MG: 100 TABLET, FILM COATED ORAL at 19:35

## 2023-03-22 RX ADMIN — OXYCODONE HYDROCHLORIDE 5 MG: 5 TABLET ORAL at 00:05

## 2023-03-22 RX ADMIN — DIPHENHYDRAMINE HYDROCHLORIDE 25 MG: 50 INJECTION INTRAMUSCULAR; INTRAVENOUS at 12:15

## 2023-03-22 RX ADMIN — FENTANYL CITRATE 50 MCG: 50 INJECTION, SOLUTION INTRAMUSCULAR; INTRAVENOUS at 12:30

## 2023-03-22 RX ADMIN — POLYETHYLENE GLYCOL 3350 238 G: 17 POWDER, FOR SOLUTION ORAL at 02:32

## 2023-03-22 RX ADMIN — HYDRALAZINE HYDROCHLORIDE 100 MG: 100 TABLET, FILM COATED ORAL at 00:05

## 2023-03-22 ASSESSMENT — ACTIVITIES OF DAILY LIVING (ADL)
ADLS_ACUITY_SCORE: 20
ADLS_ACUITY_SCORE: 30
ADLS_ACUITY_SCORE: 28
ADLS_ACUITY_SCORE: 20

## 2023-03-22 ASSESSMENT — PULMONARY FUNCTION TESTS: FEV1 (%PREDICTED): 2

## 2023-03-22 ASSESSMENT — NEW YORK HEART ASSOCIATION (NYHA) CLASSIFICATION
NYHA FUNCTIONAL CLASS: IV
NYHA FUNCTIONAL CLASS: III

## 2023-03-22 ASSESSMENT — COPD QUESTIONNAIRES
COPD: 1
CAT_SEVERITY: SEVERE

## 2023-03-22 ASSESSMENT — ENCOUNTER SYMPTOMS: DYSRHYTHMIAS: 1

## 2023-03-22 ASSESSMENT — LIFESTYLE VARIABLES: TOBACCO_USE: 1

## 2023-03-22 NOTE — PHARMACY-ADMISSION MEDICATION HISTORY
Admission Medication History Completed by Pharmacy    See [facility] Admission Navigator for allergy information, preferred outpatient pharmacy, prior to admission medications and immunization status.     Medication History Sources:     Dispense history (confirmed with Ishan, last fills of medications at beginning of March 2023)    Chart Notes ([place])    Changes made to PTA medication list (reason):    Added: nicoderm    Deleted: None    Changed:   o Furosemide changed from 20 mg BID to 20 mg every other day    Additional Information:    Unable to determine last doses of medications    Prior to Admission medications    Medication Sig Last Dose Taking? Auth Provider Long Term End Date   albuterol (PROAIR HFA/PROVENTIL HFA/VENTOLIN HFA) 108 (90 Base) MCG/ACT inhaler INHALE 1 TO 2 PUFFS INTO THE LUNGS EVERY 4 HOURS AS NEEDED FOR SHORTNESS OF BREATH OR DIFFICULT BREATHING OR WHEEZING Unknown Yes Camille Fajardo MD Yes    aspirin (ASA) 81 MG EC tablet Take 1 tablet by mouth daily Unknown Yes Reported, Patient     carvedilol (COREG) 12.5 MG tablet Take 1 tablet (12.5 mg) by mouth 2 times daily (with meals) Please make a follow up appointment for further refills. Unknown Yes Shaun Aguiar MD Yes    digoxin (LANOXIN) 125 MCG tablet Take 125 mcg by mouth daily Unknown Yes Reported, Patient Yes    furosemide (LASIX) 20 MG tablet Take 1 tablet (20 mg) by mouth 2 times daily (Only take if weight is up by 3 LBS)  Patient taking differently: Take 20 mg by mouth every other day Unknown Yes Shaun Aguiar MD Yes    lisinopril (ZESTRIL) 5 MG tablet Take 1 tablet (5 mg) by mouth 2 times daily Please make a follow up appointment for further refills. Unknown Yes Shaun Aguiar MD Yes    nicotine (NICODERM CQ) 7 MG/24HR 24 hr patch Place 1 patch onto the skin every 24 hours Unknown Yes Unknown, Entered By History     sildenafil (VIAGRA) 50 MG tablet TAKE 1 TABLET BY MOUTH ONCE DAILY AS NEEDED FOR ERECTILE DYSFUNCTION  Unknown Yes Camille Fajardo MD Yes    spironolactone (ALDACTONE) 25 MG tablet Take 0.5 tablets (12.5 mg) by mouth daily Unknown Yes Shaun Aguiar MD Yes        Date completed: 03/22/23    Medication history completed by: Milagro Desouza [facility]

## 2023-03-22 NOTE — COMMITTEE REVIEW
Thoracic Committee Review Note     Evaluation Date:   Committee Review Date: 3/22/2023    Organ being evaluated for: Heart    Transplant Phase: Referral  Transplant Status: Active    Transplant Coordinator: Willa Taylor  Transplant Physician: Shaun Aguira         Primary Diagnosis: Dilated Myopathy: Other, Specify - NICM  Secondary Diagnosis:     Committee Review Members:  Cardiology Kenzie Moreau MD, Shaun Aguiar MD, Sinan Ceballos MD   Cardiovascular Disease Trisha Brooks, RN, Nathalia Roberson, RN    - Clinical Camille Melvin, [facility]   Transplant Willa Taylor, RN   Transplant Surgery Dakota Waller MD, Maxwell Matias MD       Transplant Eligibility: Disabling heart disease on maximal medical therapy    Committee Review Decision: Needs Re-presentation    Relative Contraindications: None    Absolute Contraindications: Present smoking hx or hx of smoking w/pos cotinine level-see smoking cessation policy, Present use of recreational drugs (see chemical cessation policy)    Committee Chair Kenzie Moreau MD verbally attested to the committee's decision.    Committee Discussion Details:   57 M, ABO O, NICM, EF 5-10%, unknown etiology. Followed by Dr. Aguiar for many years. In the past pt preferred transplant but due to active smoking deemed not transplant candidate in the past. Chronic narcotic use, current tobacco and marijuana use.    Pt had VT arrest at home, 5 mins of chest compressions. Device was programmed at , VT below that for shock threshold. Transferred to [facility] in cardiogenic shock. HD - RA 13, PA 60/46, Wedge 40, CI 1.5. Hypotensive into 80s. Echo shows large ventricle. RV dilated, normal function.     SW: Long history of anxiety and depression, health psychology referral. Accountability concerns. Pain management concerns. Marijuana positive. Active tobacco use.    Currently not transplant candidate due to tobacco and marijuana use. Plan for LVAD implant  tomorrow morning. Plan to re-present patient after LVAD recovery and when pt remains abstinent from chemical use and eligible for heart transplant.

## 2023-03-22 NOTE — PLAN OF CARE
Shift events  Neuro: Pt a/o x4. Moves all extremities equally , PERRLA. C/o sore chest from previous compressions - PO oxy given x2.   CV: ST with LBBB. Has very frequent ectopy, PVCs. Noted to have couplet & triplet PVCs, x1 ~8 beat non-sustained V-tach run this AM - Pt asymptomatic. MAPs 60-80s. Afebrile.   Respiratory: 2L NC sats in mid/high 90s. No C/o SOB. LS clear.   GI: NPO this AM for colonoscopy, advanced to 2g Na+ diet & tolerating well. Ate  most of lunch and some snacks.   : Voiding appropriately.   Lines/Gtts: Milrinone continues @ 0.25mcg/kg/min. MAC capped. DL R internal jugular CVC.   Activity: Up independently in room,  gets slightly fatigued after ambulation to bathroom (HR can be up to 140s) but recovers quickly.   Labs: Lactic WNL. Continues to be hyponatremic.     Colonoscopy completed this afternoon & pt tolerated well. Slightly soft BPs during procedure but MAPs still >60. 5 versed & 250 fentanyl given per colonoscopy MD.   Pt left for cath lab around 1700 for balloon pump. Will transfer to  after.   Wife present in room & supportive of cares.     Recommendations/POC: LVAD placement tomorrow 3/23, NPO at midnight. Continue to monitor & Follow POC.     For vital signs and complete assessments, please see documentation flowsheets.

## 2023-03-22 NOTE — PLAN OF CARE
SBA/independent in room. Pt requesting oxy q4h for rib pain attributed to compressions. Sinus Tach w/ left BBB & frequent PVC's, Vtach run at 0238 and 0324- CARDS 2 notified, pt asymptomatic. MAP goal of 65-80 met. Milrinone gtt contd. Mag replaced. 2L NC dyspnea w/ exertion, diminished LS. NPO at 0000 for colonoscopy today. Bowel prep completed- per gastro note give additional bowel prep if not cleared by AM. Gastro paged by offcoming RN and oncoming RN at start of shift for PRN bowel prep- no response. CARDS 2 notified for additional miralax at 0400 as patient not completely cleared. Pt refusing to take doxycycline until he can eat because it upsets his stomach, CARDS 2 contacted requesting IV dose for this evening and 0800 (as patient will be NPO)- per CARDS okay if pt refusing medication as he is also on ceftriaxone.     Plan: Colonoscopy today for LVAD workup, monitor hemodynamics

## 2023-03-22 NOTE — ANESTHESIA PREPROCEDURE EVALUATION
Anesthesia Pre-Procedure Evaluation    Patient: Akshat Fragoso   MRN: [id number] : 1965        Procedure : Procedure(s):  INSERTION, LEFT VENTRICULAR ASSIST DEVICE (HEARTMATE IIl) AND ANY ASSOCIATED PROCEDURES          Past Medical History:   Diagnosis Date     Acute right lumbar radiculopathy 2015     Acute systolic heart failure (H) 01/10/2017     Cardiomyopathy (H) 01/10/2017     CKD (chronic kidney disease) stage 3, GFR 30-59 ml/min (H) 2020     JOHNSON (dyspnea on exertion)      ED (erectile dysfunction) 10/02/2019     Erectile dysfunction      ICD (implantable cardioverter-defibrillator) in place- [employer], single chamber- NOT dependent 10/09/2017     Personal history of smoking 2017     Pulmonary nodules 2017    CT 2018:  Bilateral pulmonary nodules measuring up to 4 mm. No new or enlarging pulmonary nodules.      Past Surgical History:   Procedure Laterality Date     CARDIAC SURGERY  2016    defibrillator placement     CV RIGHT HEART CATH MEASUREMENTS RECORDED N/A 2019    Procedure: CV RIGHT HEART CATH;  Surgeon: Jeff Aguilar MD;  Location: [facility] CARDIAC CATH LAB     None        No Known Allergies   Social History     Tobacco Use     Smoking status: Light Smoker     Packs/day: 0.25     Years: 15.00     Pack years: 3.75     Types: Cigarettes     Smokeless tobacco: Former     Quit date: 10/24/2011   Substance Use Topics     Alcohol use: No     Alcohol/week: 0.0 standard drinks      Wt Readings from Last 1 Encounters:   23 73.4 kg (161 lb 13.1 oz)        Anesthesia Evaluation            ROS/MED HX  ENT/Pulmonary:     (+) tobacco use, Current use, 40  Pack-Year Hx,  severe,  COPD,     Neurologic:       Cardiovascular: Comment: Found down at home, prolonged episode of VT with concern for cardiac arrest s//p ROSC in the field.    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 ICD dysrhythmias, Previous cardiac testing   Echo: Date: 3/16/23 Results:  Severe left ventricular dilation (LVIDd 7.7cm).  Left ventricular function is severely reduced. The ejection fraction is 5-10%.  Global right ventricular function is normal.  Moderate tricuspid insufficiency.  Estimated pulmonary artery systolic pressure is 46 mmHg.  Dilation of the inferior vena cava is present with abnormal respiratory  variation in diameter.  Stress Test: Date: Results:    ECG Reviewed: Date: Results:    Cath: Date: 3/22/23 Results:  Right sided filling pressures are severely elevated.  Severely elevated pulmonary artery hypertension.  Left sided filling pressures are severely elevated.  Normal cardiac output level.     1. Successful placement of IABP at 1:1 in RFA.  2. Severely elevated biventricular pressures with normal cardiac output on milrinone 0.25 mcg/kg/min.      METS/Exercise Tolerance:     Hematologic:       Musculoskeletal: Comment: Lumbar radiculopathy      GI/Hepatic:       Renal/Genitourinary:     (+) renal disease, type: CRI,     Endo:       Psychiatric/Substance Use: Comment: Marijuana use      Infectious Disease:       Malignancy:       Other:            Physical Exam    Airway        Mallampati: II   TM distance: > 3 FB   Neck ROM: full   Mouth opening: > 3 cm    Respiratory Devices and Support         Dental       (+) Completely normal teeth      Cardiovascular       Comment: Balloon pump      Pulmonary           (+) decreased breath sounds           OUTSIDE LABS:  CBC:   Lab Results   Component Value Date    WBC 14.8 (H) 03/22/2023    WBC 14.9 (H) 03/22/2023    HGB 14.1 03/22/2023    HGB 14.3 03/22/2023    HCT 43.3 03/22/2023    HCT 43.9 03/22/2023     03/22/2023     03/22/2023     BMP:   Lab Results   Component Value Date     (L) 03/22/2023     (L) 03/21/2023    POTASSIUM 4.0 03/22/2023    POTASSIUM 3.7 03/21/2023    CHLORIDE 89  (L) 03/22/2023    CHLORIDE 87 (L) 03/21/2023    CO2 32 (H) 03/22/2023    CO2 27 03/21/2023    BUN 18.6 03/22/2023    BUN 22.6 (H) 03/21/2023    CR 0.84 03/22/2023    CR 0.92 03/21/2023    GLC 98 03/22/2023     (H) 03/21/2023     COAGS:   Lab Results   Component Value Date    PTT 34 03/20/2023    INR 1.30 (H) 03/20/2023     POC: No results found for: BGM, HCG, HCGS  HEPATIC:   Lab Results   Component Value Date    ALBUMIN 3.9 03/21/2023    PROTTOTAL 6.6 03/21/2023    ALT 19 03/21/2023    AST 31 03/21/2023    ALKPHOS 86 03/21/2023    BILITOTAL 1.1 03/21/2023     OTHER:   Lab Results   Component Value Date    LACT 1.0 03/22/2023    A1C 5.5 12/04/2018    TONY 9.0 03/22/2023    PHOS 4.1 03/22/2023    MAG 1.8 03/22/2023    TSH 0.73 03/20/2023    CRP <2.9 11/19/2018       Anesthesia Plan    ASA Status:  4   NPO Status:  NPO Appropriate    Anesthesia Type: General.     - Airway: ETT   Induction: Intravenous.   Maintenance: Balanced.   Techniques and Equipment:     - Lines/Monitors: 2nd IV, Arterial Line, PAC, BIS, NIRS, JOAN, CVL in situ            JOAN Absolute Contra-indication: NONE            JOAN Relative Contra-indication: NONE     - Blood: Blood in Room     Consents    Anesthesia Plan(s) and associated risks, benefits, and realistic alternatives discussed. Questions answered and patient/representative(s) expressed understanding.     - Discussed: Risks, Benefits and Alternatives for BOTH SEDATION and the PROCEDURE were discussed     - Discussed with:  Patient      - Extended Intubation/Ventilatory Support Discussed: Yes.      - Patient is DNR/DNI Status: No    Use of blood products discussed: Yes.     Postoperative Care    Pain management: IV analgesics, Multi-modal analgesia.   PONV prophylaxis: Background Propofol Infusion     Comments:                Antwon Mac MD

## 2023-03-22 NOTE — H&P
CV ICU H&P    ASSESSMENT: Akshat Fragoso is a 57 year old male with PMH of HFrEF (10%) secondary to NICM (suspected viral etiology) s/p ICD, chronic tobacco/marijuana use, COPD, HTN, CKD stage III, who presented to [facility] on 3/15 after being found down from a VT arrest. CPR was performed by family. Spontaneously converted to NSR without defibrillation. Down time suspected to be ~ 6 minutes. Presents to [facility] for LVAD placement with preop IABP by Dr. Matias on 3/23/23. He arrives to the CVICU intubated and sedated on propofol gtt s/p LVAD (Heartmate III) placement, TVR, and PFO repair with Dr. Matias and Dr. Waller 3/23/22.     Interval history: LVAD placement with triscuspid valve repair and PFO repair, 4 CT placed (1 pleural, 3 med). Needed norepi gtt until bypass time,  ml (UOP during bypass 200). Needed epi gtt, norepi, vasopressin and angiotensin II, and nitric for right inotropic support coming off. Intraoperative he received: 2 L crystalloid, 2 plts, 500 U K-centra.         CO-MORBIDITIES:   Patient Active Problem List   Diagnosis     CHF (congestive heart failure) (H)     Cardiomyopathy, nonischemic (H)     Personal history of tobacco use, presenting hazards to health     ICD (implantable cardioverter-defibrillator) in place- [employer], single chamber- NOT dependent     Chronic systolic heart failure (H)     JOHNSON (dyspnea on exertion)     Acute on chronic systolic congestive heart failure (H)     Hypertrophic nonobstructive cardiomyopathy (H)     Other ill-defined heart diseases     Erectile dysfunction, unspecified erectile dysfunction type     COPD, severe (H)     Ventricular tachycardia       PLAN:  Neuro/ pain/ sedation:  - Monitor neurological status. Notify the MD for any acute changes in exam.  #Acute postoperative pain  - Scheduled: tylenol   - PRN: hydromorphone, oxycodone  #Sedation  - propofol gtt, fentanyl gtt    Pulmonary care:   #Mechanical ventilation  Vent Mode: CMV/AC  (Continuous  Mandatory Ventilation/ Assist Control)  FiO2 (%): 40 %  Resp Rate (Set): 18 breaths/min  Tidal Volume (Set, mL): 480 mL  PEEP (cm H2O): 5 cmH2O  Resp: 18  - PST tomorrow   - Goal SpO2 > 92%  - Encourage IS q15-30 minutes when awake.    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   - Goal MAP >65, SBP <140  - SCD ppx/BiV pacer: ICD, reprogrammed device to provide ATP burst at VT threshold. Device consult in  - Hold PTA ASA, Spironolactone, lisinopril, carvedilol     Preop Echo:  Severe left ventricular dilation (LVIDd 7.7cm).  Left ventricular function is severely reduced. The ejection fraction is 5-10%.  Global right ventricular function is normal.  Moderate tricuspid insufficiency.  Estimated pulmonary artery systolic pressure is 46 mmHg.  Dilation of the inferior vena cava is present with abnormal respiratory  variation in diameter.    GI care / Nutrition:   - NPO, bedside swallow eval once extubated  - Nutrition consulted, appreciate recommendations  - PPI for bowel prophylaxis  - Hold off on tube feeds for now  - Bowel regimen: MiraLAX, senna    Renal / Fluids / Electrolytes:   BL creat appears to be wnl  - Strict I/O, daily weights  - Avoid/limit nephrotoxins as able  - Replete lytes PRN per protocol. Actively replacing potassium     Endocrine:    #Stress hyperglycemia  - Insulin gtt  - Goal BG <180 for optimal healing    ID / Antibiotics:  #Stress induced leukocytosis  - No s/sx infection  - To complete perioperative regimen  - Continue to monitor fever curve, WBC, and inflammatory markers as appropriate    Heme:     #Acute blood loss anemia  #Acute blood loss thrombocytopenia  No s/sx active bleeding  - Continue  to monitor  - CBC   - Hgb goal > 7.0  - Hemoglobin stable 12.7 post op    MSK / Skin:  #Sternotomy  #Surgical Incision  - Sternal precautions  - Postoperative incision management per protocol  - PT/OT/CR    Prophylaxis:     - Mechanical ppx for DVT  - Chemical DVT ppx: Hold SQH tonight  - PPI  - Bowel regimen: PPI, MiraLAX, senna    Lines / Tubes / Drains:  - ETT  - RIJ CVC, PA catheter  - Arterial Line  - CTs x4  - Adkins  - OG    Disposition:  - CVICU    Patient seen, findings and plan discussed with CVICU staff.    Balaji Daley MD, PGY3  Surgery  3/21/2023 at 10:01 PM      ====================================    HPI:   Presents to CVICU intubated and sedated.      PAST MEDICAL HISTORY:   Past Medical History:   Diagnosis Date     Acute right lumbar radiculopathy 11/02/2015     Acute systolic heart failure (H) 01/10/2017     Cardiomyopathy (H) 01/10/2017     CKD (chronic kidney disease) stage 3, GFR 30-59 ml/min (H) 01/30/2020     JOHNSON (dyspnea on exertion)      ED (erectile dysfunction) 10/02/2019     Erectile dysfunction      ICD (implantable cardioverter-defibrillator) in place- [employer], single chamber- NOT dependent 10/09/2017     Personal history of smoking 01/04/2017     Pulmonary nodules 01/17/2017    CT 11/2018:  Bilateral pulmonary nodules measuring up to 4 mm. No new or enlarging pulmonary nodules.       PAST SURGICAL HISTORY:   Past Surgical History:   Procedure Laterality Date     CARDIAC SURGERY  2016    defibrillator placement     CV RIGHT HEART CATH MEASUREMENTS RECORDED N/A 11/20/2019    Procedure: CV RIGHT HEART CATH;  Surgeon: Jeff Aguilar MD;  Location:  HEART CARDIAC CATH LAB     None         FAMILY HISTORY:   Family History   Problem Relation Age of Onset     Parkinsonism Mother      Atrial fibrillation Father      Heart Failure Father      Prostate Cancer Father      Skin Cancer Father      Anorexia nervosa Daughter      Other - See Comments Granddaughter          premature birth       SOCIAL HISTORY:   Social History     Tobacco Use     Smoking status: Light Smoker     Packs/day: 0.25     Years: 15.00     Pack years: 3.75     Types: Cigarettes     Smokeless tobacco: Former     Quit date: 10/24/2011   Substance Use Topics     Alcohol use: No     Alcohol/week: 0.0 standard drinks         OBJECTIVE:  1. VITAL SIGNS:   Temp:  [96.9  F (36.1  C)-98.5  F (36.9  C)] 98.4  F (36.9  C)  Pulse:  [] 106  Resp:  [14-20] 20  BP: ()/(39-74) 99/63  SpO2:  [90 %-98 %] 98 %    Resp: 20        2. INTAKE/ OUTPUT:   I/O last 3 completed shifts:  In: 1356.51 [P.O.:1090; I.V.:266.51]  Out: 1485 [Urine:1485]      3. PHYSICAL EXAMINATION:   General: sedated  Neuro: sedated  Resp: intubated, ventilated  CV: S1, S2, RRR, no m/r/g   Abdomen: Soft, non-distended, non-tender  Incisions: c/d/i  Extremities: warm and well perfused  CTx4: To suction, serosang output      4. INVESTIGATIONS:   Arterial Blood Gases   Recent Labs   Lab 03/23/23  1751 03/23/23  1707 03/23/23  1343 03/23/23  1254   PH 7.44 7.44 7.35 7.37   PCO2 44 42 56* 54*   PO2 153* 177* 214* 473*   HCO3 30* 28 31* 31*     Complete Blood Count   Recent Labs   Lab 03/21/23  0448 03/19/23  0945 03/19/23  0411 03/18/23  0431   WBC 14.4* 16.4* 15.6* 15.2*   HGB 14.8 15.8 14.6 15.2    142* 122*  122* 130*     Basic Metabolic Panel  Recent Labs   Lab 03/21/23  1813 03/21/23  1558 03/21/23  0413 03/21/23  0005 03/20/23  1420   * 130* 130*  --  132*   POTASSIUM 3.7 4.1 3.7  --  3.9   CHLORIDE 87* 88* 88*  --  88*   CO2 27 27 29  --  30*   BUN 22.6* 23.8* 27.8*  --  24.8*   CR 0.92 0.88 0.97  --  1.03   * 173* 160* 118* 168*     Liver Function Tests  Recent Labs   Lab 03/21/23  1813 03/20/23  1420 03/18/23  0431 03/16/23  2000 03/15/23  2149 03/15/23  2003   AST 31  --  43 34 34 35   ALT 19  --  27 26 22 24   ALKPHOS 86  --  74 78 73 82   BILITOTAL 1.1  --  1.5* 1.1 0.7 0.7   ALBUMIN 3.9  --  3.7 3.9 3.6 3.8   INR  --   1.30* 1.18*  --   --  1.13     Pancreatic Enzymes  No lab results found in last 7 days.  Coagulation Profile  Recent Labs   Lab 03/20/23  1420 03/18/23  0431 03/15/23  2003   INR 1.30* 1.18* 1.13   PTT 34 28  --          5. RADIOLOGY:   Recent Results (from the past 24 hour(s))   XR Chest Port 1 View    Narrative    EXAM: XR CHEST PORT 1 VIEW  3/21/2023 6:35 AM     HISTORY:  assess swan       COMPARISON:  3/19/2023    FINDINGS: Portable AP 30 degree chest radiograph.  Right IJ Regan-Jalen catheter has been slightly retracted and projects  over the proximal right main pulmonary artery. Stable left chest wall  cardiac device with single lead terminating over the right ventricle.  The cardiac silhouette remains enlarged. No pneumothorax. No pleural  effusion. Slight increase in diffuse interstitial opacities. Osseous  structures are unchanged.      Impression    IMPRESSION:   1. Right IJ Regan-Jalen catheter has been slightly retracted with the  tip terminating over the proximal right main pulmonary artery.  2. Cardiomegaly with slightly increased diffuse interstitial  opacities, likely pulmonary edema.    I have personally reviewed the examination and initial interpretation  and I agree with the findings.    SLICK VALDERRAMA MD         SYSTEM ID:  [id number]       =========================================

## 2023-03-22 NOTE — CONSULTS
Cardiac Surgery Consultation      Akshat Fragoso MRN# [id number]   YOB: 1965 Age: 57 year old   Date of Admission: 3/15/2023     Reason for consult: LVAD     Primary Cardiologist: Riccardo Dunn       Assessment and Plan:   Mr. Akshat Fragoso  is a 57-year-old man with acute on chronic systolic heart failure (EF 10%) secondary to NICM, NYHA IIIb, who presented to [facility] following VT arrest with short down-time. Currently neurologically intact and LVAD work-up without any contraindications to proceeding.     I described the technical details, as well as the expected postoperative course and recovery to the patient. We also discussed the risks and benefits of the procedure. The risks include but are not limited to bleeding, infection, stroke, RV failure, dysrhythmia, respiratory failure, kidney or liver injury, and death.     After carefully considering the above, the patient understands these risks and wishes to undergo the operation.     Plan    -LVAD tomorrow. Pre-op IABP tonight.  -eConsent signed    K. Maxwell Matias MD  Cardiothoracic Surgery  Pager [phone]         History of Present Illness:   This patient is a 57 year old male with a PMHx significant for HFrEF (10%) secondary to NICM (suspected viral etiology) s/p ICD, chronic tobacco/marijuana use, hypertension, lumbar radiculopathy, chronic kidney disease stage III, who presented to [facility] on 3/15 after being found down from a VT arrest. CPR was performed by family. Spontaneously converted to NSR without defibrillation. Down time suspected to be ~ 6 minutes. Stabilized during hospitalization thus far and has been undergoing work-up for LVAD. Not currently a transplant candidate due to recent history of substance use.     Colonoscopy performed today with no evidence of malignancy/polyps. Work-up nearly complete with no contraindications to proceeding. Denies fevers/chills.      - Coronary angiogram 2/2017 minimal nonobstructive coronary disease  -  Echocardiogram 3/16/23: LVIDd 7.7, EF 5-10%  - CPX: Peak VO2: 13.3 ml/kg/min 3/8/21, with RER of 1.00    DATE MAP CVP PAP PCWP Carlos CO Carlos CI SVR MVo2 Therapies   3/18 72 4 64/34 22 4.4 2.4   72 Nipride  1.15   3/19 77 8 58/34 26 4.6 2.5 1200 72 Nipride, hydralazine   3/20 84 13 62/46/45 40 3.7 2 1315 64 Hydralazine                 Past Medical History:     Past Medical History:   Diagnosis Date     Acute right lumbar radiculopathy 11/02/2015     Acute systolic heart failure (H) 01/10/2017     Cardiomyopathy (H) 01/10/2017     CKD (chronic kidney disease) stage 3, GFR 30-59 ml/min (H) 01/30/2020     JOHNSON (dyspnea on exertion)      ED (erectile dysfunction) 10/02/2019     Erectile dysfunction      ICD (implantable cardioverter-defibrillator) in place- [place], single chamber- NOT dependent 10/09/2017     Personal history of smoking 01/04/2017     Pulmonary nodules 01/17/2017    CT 11/2018:  Bilateral pulmonary nodules measuring up to 4 mm. No new or enlarging pulmonary nodules.             Past Surgical History:     Past Surgical History:   Procedure Laterality Date     CARDIAC SURGERY  2016    defibrillator placement     CV RIGHT HEART CATH MEASUREMENTS RECORDED N/A 11/20/2019    Procedure: CV RIGHT HEART CATH;  Surgeon: Jeff Aguilar MD;  Location:  HEART CARDIAC CATH LAB     None                 Social History:     Social History     Tobacco Use     Smoking status: Light Smoker     Packs/day: 0.25     Years: 15.00     Pack years: 3.75     Types: Cigarettes     Smokeless tobacco: Former     Quit date: 10/24/2011   Substance Use Topics     Alcohol use: No     Alcohol/week: 0.0 standard drinks             Family History:     Family History   Problem Relation Age of Onset     Parkinsonism Mother      Atrial fibrillation Father      Heart Failure Father      Prostate Cancer Father      Skin Cancer Father      Anorexia nervosa Daughter      Other - See Comments Granddaughter         premature birth              Medications:     Current Facility-Administered Medications Ordered in Epic   Medication Dose Route Frequency Last Rate Last Admin     0.9% sodium chloride BOLUS  500 mL Intravenous Once PRN         acetaminophen (TYLENOL) tablet 975 mg  975 mg Oral Q6H PRN   975 mg at 03/19/23 1955     atropine injection 1 mg  1 mg Intravenous Once PRN         benzocaine 20% (HURRICAINE/TOPEX) 20 % spray 0.5 mL  1 spray Mouth/Throat Once PRN         Continuing ACE inhibitor/ARB/ARNI from home medication list OR ACE inhibitor/ARB/ARNI order already placed during this visit   Does not apply DOES NOT GO TO MAR         Continuing beta blocker from home medication list OR beta blocker order already placed during this visit   Does not apply DOES NOT GO TO MAR         digoxin (LANOXIN) tablet 125 mcg  125 mcg Oral Daily   125 mcg at 03/22/23 0816     doxycycline hyclate (VIBRAMYCIN) capsule 100 mg  100 mg Oral Q12H HOWIE (08/20)   100 mg at 03/21/23 0813     EPINEPHrine PF (ADRENALIN) injection 0.1 mg  0.1 mg Submucosal Once PRN         fentaNYL (PF) (SUBLIMAZE) injection  mcg   mcg Intravenous Q5 Min PRN   50 mcg at 03/22/23 1230     flumazenil (ROMAZICON) injection 0.2 mg  0.2 mg Intravenous q1 min prn         glucagon injection 0.5 mg  0.5 mg Intravenous Once PRN         hydrALAZINE (APRESOLINE) tablet 100 mg  100 mg Oral Q6H HOWIE   100 mg at 03/22/23 1343     levalbuterol (XOPENEX) neb solution 1.25 mg  1.25 mg Nebulization Q2H PRN   1.25 mg at 03/18/23 2222     Lidocaine (LIDOCARE) 4 % Patch 2 patch  2 patch Transdermal Q24H   2 patch at 03/21/23 0813     lidocaine patch in PLACE   Transdermal Q8H HOWIE         midazolam (VERSED) injection 0.5-2 mg  0.5-2 mg Intravenous Q4 Min PRN   1 mg at 03/22/23 1230     milrinone (PRIMACOR) infusion 200 mcg/mL PREMIX  0.25 mcg/kg/min (Dosing Weight) Intravenous Continuous 5.6 mL/hr at 03/22/23 1700 0.25 mcg/kg/min at 03/22/23 1700     naloxone (NARCAN) injection 0.2 mg  0.2 mg  Intravenous Q2 Min PRN        Or     naloxone (NARCAN) injection 0.4 mg  0.4 mg Intravenous Q2 Min PRN        Or     naloxone (NARCAN) injection 0.2 mg  0.2 mg Intramuscular Q2 Min PRN        Or     naloxone (NARCAN) injection 0.4 mg  0.4 mg Intramuscular Q2 Min PRN         naloxone (NARCAN) injection 0.2 mg  0.2 mg Intravenous Q2 Min PRN        Or     naloxone (NARCAN) injection 0.4 mg  0.4 mg Intravenous Q2 Min PRN        Or     naloxone (NARCAN) injection 0.2 mg  0.2 mg Intramuscular Q2 Min PRN        Or     naloxone (NARCAN) injection 0.4 mg  0.4 mg Intramuscular Q2 Min PRN         [START ON 3/23/2023] nicotine (NICODERM CQ) 7 MG/24HR 24 hr patch 1 patch  1 patch Transdermal Daily         [START ON 3/23/2023] nicotine Patch in Place   Transdermal Q8H         ondansetron (ZOFRAN ODT) ODT tab 4 mg  4 mg Oral Q6H PRN         ondansetron (ZOFRAN) injection 4 mg  4 mg Intravenous Q6H PRN   4 mg at 03/20/23 0938     oxyCODONE (ROXICODONE) tablet 5 mg  5 mg Oral Q4H PRN   5 mg at 03/22/23 1343     polyethylene glycol (MIRALAX) Packet 17-34 g  17-34 g Oral BID PRN   17 g at 03/19/23 0815     senna-docusate (SENOKOT-S/PERICOLACE) 8.6-50 MG per tablet 1 tablet  1 tablet Oral At Bedtime   1 tablet at 03/18/23 2149     simethicone (MYLICON) suspension 133 mg  133 mg Oral Once PRN         sodium chloride (PF) 0.9% PF flush 3 mL  3 mL Intravenous q1 min prn         No current Epic-ordered outpatient medications on file.             Review of Systems:     The 10 point Review of Systems is negative other than noted in the HPI            Physical Exam:   Vitals were reviewed    Gen: resting comfortably in NAD  CV: RRR  Pulm: normal work of breathing on room air  Abd: soft, non-tender, non-distended  Ext: warm and well perfused         Data:   All laboratory data reviewed  Lab Results   Component Value Date    WBC 14.8 (H) 03/22/2023    HGB 14.1 03/22/2023    HCT 43.3 03/22/2023     03/22/2023     (L) 03/22/2023     POTASSIUM 4.0 03/22/2023    CHLORIDE 89 (L) 03/22/2023    CO2 32 (H) 03/22/2023    BUN 18.6 03/22/2023    CR 0.84 03/22/2023    GLC 98 03/22/2023    DD 1.54 (H) 03/15/2023    NTBNPI 31,087 (H) 03/20/2023    NTBNP 2,306 (H) 11/20/2019    TROPI 0.033 01/09/2017    AST 31 03/21/2023    ALT 19 03/21/2023    ALKPHOS 86 03/21/2023    BILITOTAL 1.1 03/21/2023    INR 1.30 (H) 03/20/2023

## 2023-03-22 NOTE — TELEPHONE ENCOUNTER
VAD Multidisciplinary Review  Multidisciplinary review date: 3/22/23  Inclusion Criteria  Discussed VAD with patient and/or decision makers. Reviewed anticipated survival benefit, anticipated functional improvement, risks, and benefits. Patient and/or decision maker verbalize understanding and agree to VAD implant?: Yes  VAD is elective procedure?: Yes  NYHA class: IV  INTERMACS score: 3  Patient has: failed to respond to optimal medical management for at least 45 of the last 60 days, acute onset critical illness with precipitous decline. Significant risk posed to patient by delaying VAD implant.  Additional detail: recent cardiac arrest requiring CPR  Cardiopulmonary stress testing completed?: None related to acute illness  LVEF is: < 25%  Exclusion Criteria  Has condition, other than heart failure, which would limit survival to < 2 years?: No  Cardiomyopathy with restrictive physiology, unless severe LV systolic failure and LV dilation present?: No  Technical obstacles that pose and inordinately high surgical risk in the judgment of the MCS surgeon?: No  Active systemic infection? (unless ALL of following criteria met: negative blood cultures x 2 days, antibiotic treatment x 7 days and Infectious Disease clearance pre-operatively): No  Presence of active malignancy AND life expectancy < 2 years?: No  Stroke within the last six weeks, unless cleared by neurology team: No  Diagnosed with severe, irreversible pulmonary disease (supplemental O2 usage, FEV1 < 50% predicted): No  Pulmonary hypertension as a primary diagnosis: No  Chronic dialysis: No  Evidence of significant peripheral vascular disease accompanied by resting leg pain or ulceration unless treatment plan is in place: No  Carotid artery disease (>80% extracranial stenosis on Doppler) that could result in an adverse neurological event if left untreated: No  Evidence of an untreated abdominal aortic aneurysm >5 cm as measured by abdominal ultrasounds within  the last 180 days unless treatment plan in place: No  Severe or irreversible hepatic disease, evidence of shock liver, and/o biopsy-proven cirrhosis: No  Active contraindication to anticoagulation, INR > 2.5 in absence of concurrent anticoagulation therapy, platelet < 50,000, history of intolerance to anticoagulation, or other coagulopathy unless Hematology consulted and plan is in place: No  Acute valvular infective endocarditis with bacteremia: No  Neuromuscular disease, psychiatric diagnosis, dementia or other process that severely compromises ability to use and care for external system components or to ambulate/exercise: No  Inability to understand the procedure, risk involved, or to comply with follow-up evaluation by VAD team: No  For menstruating females: Current pregnancy or unable/unwilling to follow contraception plan: Not applicable  Relative Contraindications  Alcohol and/or recreational drug abuse within past six months: No  Significant history of depression or other mental illness, refractory to therapy or thought to be a risk to successful outcome with MCS, as per assessment of trained professional: No  Demonstrated on-going non-compliance with demonstrated inability to comply with medical recommendations on multiple occasions: No  Unsafe living environment or lack of adequate support system: No  Lack of adequate insurance coverage or waiver by hospital administration: No  Evidence of other ongoing physical, financial, or psychosocial issues that will preclude the intended goal of safety and improvements in quality and duration of life, unless a plan for resolution and support is in process: No  Multidisciplinary Decision  Decision: Approved    Implant as: Destination Therapy  Ineligible for transplant reason: Substance/Nicotine Abuse

## 2023-03-22 NOTE — PROGRESS NOTES
[facility]    Cardiology Progress Note- Cardiology      Date of Admission:  3/15/2023     Assessment & Plan: SL   Akshatjaimee Fragoso is a 57 year old man with past medical history of HFrEF (EF 10% 5/2022) 2/2 NICM s/p ICD, tobacco use disorder, marijuana use, HTN, lumbar radiculopathy, CKD who presents after being found down at home. Found to have had a prolonged episode of VT with concern for cardiac arrest s/p ROSC in the field.    Today:  - 15 beat NSVT overnight, slightly more frequent PVCs, monitoring closely  - Possible IABP prior to LVAD (OR for VAD planned 3/23)  - Hubbard pulled yesterday with sheath still in (malfunctioning). Redo swan today vs. tomorrow  - Milrinone increased to 0.25 yesterday from 0.125 given increase in lactic acid, now with improvement  - Colonoscopy today  - Last day of abxs today, no new signs of infection and no concern for bacteremia, finishing course for initial possible CAP    Neurology  # Lumbosacral radiculopathy  - Acetaminophen prn   - Takes norco at home, if severe pain here can use low dose oxycodone  -Pain medicine consult, declined injection      Cardiovascular  # Ventricular tachycardia, out of hospital cardiac arrest  # Cardiogenic chock  # Acute on Chronic Systolic Heart failure (EF 10% 5/2022) 2/2 NICM ( 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 Presented with out of hospital cardiac arrest with VT for 5 min 43 seconds of VT at 199 bpm, with spontaneous conversion to sinus rhythm without defibrillation on device interrogation on 3/ 15/2023. He had  A run of  VT while here, with hypotension on 3/16/18. Did not tolerate amiodarone gtt, due to hypotension.  Has been in sinus since then. Has been managed here with diuresis and afterload reducing agents with hemodynamic monitoring.  Patient started workup at [place] for LVAD as DT (initially evaluated for transplant though patient having trouble quitting marijuana/tobacco use). Now has started work up for LVAD here and has OR date planned for 3/23.     Recent studies:  - Coronary angiogram 2/2017 minimal nonobstructive coronary disease  - Echocardiogram 3/16/23: LVIDd 7.7, EF 5-10%  - CPX: Peak VO2: 13.3 ml/kg/min 3/8/21, with RER of 1.00      DATE MAP CVP PAP PCWP Carlos CO Carlos CI SVR MVo2 Therapies   3/18 72 4 64/34 22 4.4 2.4  72 Nipride  1.15   3/19 77 8 58/34 26 4.6 2.5 1200 72 Nipride, hydralazine   3/20 84 13 62/46/45 40 3.7 2 1315 64 Hydralazine       - VT secondary prevention while inpatient: held amiodarone due to intolerance with hypotension, has ICD  - Continue PTA digoxin 125 mcg  - Inotropes: milrinone 0.25   - Volume status: euvolemic, keeping as dry as possible prior to OR  - BB: Hold PTA carvedilol 12.5 mg BID  - ACEi/ARB/ARNI: hold PTA lisinopril 5 mg BID  - afterload reduction: hydralazine 100mg q6h  - MRA: hold PTA spironolactone 12.5 mg BID  - SGLT2i: none PTA  - SCD ppx/BiV pacer: ICD, reprogrammed device to provide ATP burst at VT threshold  - discontinued PTA aspirin as there is no clear indication  - Fluid restrict, daily I/O, daily weights, lytes checks   Started LVAD/Heart transplant eval   -- CT head- normal   -- CT chest-abdomen and pelvis:  Basilar atelectasis with faint GGO. Colonic diverticulosis without diverticulitis  -- Upper Extremity doppler: pending   -- Lower ext arterial doppler: Normal     -- CT dental: done periodontal disease, no evidence of cellulitis or abscess  -- US Carotid done prior in 2018 and normal  -- US MISAEL bilateral: done  -- SW consult: done  -- Neuropsych consult: done  -- GI: inpatient colonoscopy 3/22  -- Heme/onc: pending   -- Palliative care consult: done  -- Cardiothoracic consult: done  -- Dental consult: carious lesions on #14 and #2 teeth, follow up with existing dentist, no need for  "extraction prior to LVAD  -- Nutrition consult : done  -- PT/OT     -- 6 min walk test: done 2018    # Documented history of atrial fibrillation  Currently in sinus, unable to see in notes from [place] where this was found. Not on anticoagulation  - Telemetry here     Pulmonary  # Tobacco use disorder  Has cut back it appears to a few cigarettes daily, about 40 pack year history     # Marijuana use  Uses for anxiety        Gastrointestinal, Nutrition  #no issues, inpatient colonoscopy for screening 3/22     Renal, Electrolytes  # CKD  Likely in setting of cardiorenal pathology over time. Cr baseline   - Monitor  Hypokalemia: replace  Hypomagnesemia : replace  Hypocalcemia: replace     Infectious Disease  # Leukocytosis  # C/f CAP  Increasing WBC, with cough, chest pain, and purulent sputum production and GGO on chest CT  c/f CAP on admission that has since resolved, continuing abxs for CAP for 5 day course, last day 3/22    -ceftriaxone (3/18-3/22)  -doxycycline (3/18-3/22)     Hematology  #no issues, no anemia or thrombocytopenia     Endocrinology  #no issues     Integumentary:  #no skin issues    Diet: Fluid restriction 2000 ML FLUID  Clear Liquid Diet    DVT Prophylaxis: Pneumatic Compression Devices  Adkins Catheter: Not present  Cardiac Monitoring: ACTIVE order. Indication: Tachyarrhythmias, acute (48 hours)  Code Status: Full Code          Clinically Significant Risk Factors         # Hyponatremia: Lowest Na = 128 mmol/L in last 2 days, will monitor as appropriate    # Hypomagnesemia: Lowest Mg = 1.6 mg/dL in last 2 days, will replace as needed              # Overweight: Estimated body mass index is 25.34 kg/m  as calculated from the following:    Height as of this encounter: 1.702 m (5' 7\").    Weight as of this encounter: 73.4 kg (161 lb 13.1 oz).          Disposition Plan   Post potential advanced therapies, >3 days    Entered: Phil Hoffman MD 03/22/2023, 8:42 AM   The patient's care was discussed with the " Attending Physician, Dr. Aguiar.      Phil Hoffman MD  Internal Medicine PGY3  [facility]  ______________________________________________________________________    Interval History   Frustrated with bowel prep though knows he needs to do it. Ambulating fine to the restroom without lightheadedness or shortness of breath. 15 beat run NSVT around 2:30 AM last night, more frequent PVCs.    Physical Exam   Vital Signs: Temp: 97.8  F (36.6  C) Temp src: Oral BP: 100/69 Pulse: (!) 121   Resp: 18 SpO2: 94 % O2 Device: Nasal cannula with humidification Oxygen Delivery: 2 LPM  Weight: 161 lbs 13.08 oz  Temp: 97.8  F (36.6  C) Temp  Min: 97.8  F (36.6  C)  Max: 99.3  F (37.4  C)  Resp: 18 Resp  Min: 18  Max: 21  SpO2: 94 % SpO2  Min: 90 %  Max: 98 %  Pulse: (!) 121 Pulse  Min: 99  Max: 121    No data recorded  BP: 100/69 Systolic (24hrs), Av , Min:79 , Max:113   Diastolic (24hrs), Av, Min:39, Max:93    GEN: comfortable, resting in bed  HEENT: no icterus  CV: RRR, S3   CHEST: CTAB  ABD: soft, NT/ND, NABS  NEURO: AA&Ox3, fluent/appropriate, motor grossly nonfocal  PSYCH: cooperative, affect appropriate    Medical Decision Making       Please see A&P for additional details of medical decision making.      Data: reviewed over last 24 hours

## 2023-03-22 NOTE — OR NURSING
Assisted with bedside colonoscopy on the ICU for a LVAD work up. Pt had a difficult time tolerating the procedure with the doctor using adult scope. Changed to a pediatric colonoscope and cecum was reached. No interventions were required, no polyps were found.

## 2023-03-22 NOTE — PROCEDURES
Gastroenterology Endoscopy Suite Brief Operative Note    Procedure:  Colonoscopy   Post-operative diagnosis:  colorectal cancer screening   Staff Physician:  Dr. Khushboo Crespo   Fellow/Assistant(s):  N/A    Specimens:  Please see final procedure note for further details.   Findings:  Normal   Complications:  None.   Condition:  Stable   Recommendations  Diet:  Return to previous diet  PPI:  N/A  Anti-coagulants/platelets:  N/A  Octreotide:  N/A  Discharge Planning:   Remain in ICU for ongoing cares.   GI service will sign off at this time.

## 2023-03-23 ENCOUNTER — APPOINTMENT (OUTPATIENT)
Dept: GENERAL RADIOLOGY | Facility: CLINIC | Age: 58
DRG: 001 | End: 2023-03-23
Attending: STUDENT IN AN ORGANIZED HEALTH CARE EDUCATION/TRAINING PROGRAM
Payer: COMMERCIAL

## 2023-03-23 ENCOUNTER — APPOINTMENT (OUTPATIENT)
Dept: GENERAL RADIOLOGY | Facility: CLINIC | Age: 58
DRG: 001 | End: 2023-03-23
Attending: INTERNAL MEDICINE
Payer: COMMERCIAL

## 2023-03-23 ENCOUNTER — ANCILLARY PROCEDURE (OUTPATIENT)
Dept: CARDIOLOGY | Facility: CLINIC | Age: 58
DRG: 001 | End: 2023-03-23
Attending: INTERNAL MEDICINE
Payer: COMMERCIAL

## 2023-03-23 ENCOUNTER — ANESTHESIA (OUTPATIENT)
Dept: SURGERY | Facility: CLINIC | Age: 58
DRG: 001 | End: 2023-03-23
Payer: COMMERCIAL

## 2023-03-23 LAB
ABO/RH(D): NORMAL
ALBUMIN SERPL BCG-MCNC: 3 G/DL (ref 3.5–5.2)
ALLEN'S TEST: ABNORMAL
ALP SERPL-CCNC: 65 U/L (ref 40–129)
ALT SERPL W P-5'-P-CCNC: 16 U/L (ref 10–50)
ANION GAP SERPL CALCULATED.3IONS-SCNC: 11 MMOL/L (ref 7–15)
ANION GAP SERPL CALCULATED.3IONS-SCNC: 13 MMOL/L (ref 7–15)
ANTIBODY SCREEN: NEGATIVE
APTT PPP: 31 SECONDS (ref 22–38)
APTT PPP: 34 SECONDS (ref 22–38)
AST SERPL W P-5'-P-CCNC: 53 U/L (ref 10–50)
BASE EXCESS BLDA CALC-SCNC: 3.4 MMOL/L (ref -9–1.8)
BASE EXCESS BLDA CALC-SCNC: 3.7 MMOL/L (ref -9–1.8)
BASE EXCESS BLDA CALC-SCNC: 4.3 MMOL/L (ref -9.6–2)
BASE EXCESS BLDA CALC-SCNC: 4.7 MMOL/L (ref -9.6–2)
BASE EXCESS BLDA CALC-SCNC: 4.7 MMOL/L (ref -9–1.8)
BASE EXCESS BLDA CALC-SCNC: 5.2 MMOL/L (ref -9–1.8)
BASE EXCESS BLDA CALC-SCNC: 7.9 MMOL/L (ref -9.6–2)
BASE EXCESS BLDA CALC-SCNC: 8.1 MMOL/L (ref -9.6–2)
BASE EXCESS BLDA CALC-SCNC: 8.6 MMOL/L (ref -9.6–2)
BASE EXCESS BLDA CALC-SCNC: 9.4 MMOL/L (ref -9.6–2)
BASE EXCESS BLDA CALC-SCNC: 9.4 MMOL/L (ref -9.6–2)
BASE EXCESS BLDV CALC-SCNC: 10.5 MMOL/L (ref -8.1–1.9)
BASE EXCESS BLDV CALC-SCNC: 11.7 MMOL/L (ref -7.7–1.9)
BASE EXCESS BLDV CALC-SCNC: 12.8 MMOL/L (ref -7.7–1.9)
BASE EXCESS BLDV CALC-SCNC: 4 MMOL/L (ref -7.7–1.9)
BASE EXCESS BLDV CALC-SCNC: 5.2 MMOL/L (ref -7.7–1.9)
BASE EXCESS BLDV CALC-SCNC: 5.7 MMOL/L (ref -7.7–1.9)
BASE EXCESS BLDV CALC-SCNC: 6.1 MMOL/L (ref -7.7–1.9)
BASE EXCESS BLDV CALC-SCNC: 6.2 MMOL/L (ref -7.7–1.9)
BASOPHILS # BLD AUTO: 0.1 10E3/UL (ref 0–0.2)
BASOPHILS NFR BLD AUTO: 1 %
BILIRUB SERPL-MCNC: 2.6 MG/DL
BLD PROD TYP BPU: NORMAL
BLD PROD TYP BPU: NORMAL
BLOOD COMPONENT TYPE: NORMAL
BLOOD COMPONENT TYPE: NORMAL
BUN SERPL-MCNC: 15.8 MG/DL (ref 6–20)
BUN SERPL-MCNC: 15.8 MG/DL (ref 6–20)
BUN SERPL-MCNC: 19.7 MG/DL (ref 6–20)
BUN SERPL-MCNC: 20.1 MG/DL (ref 6–20)
CA-I BLD-MCNC: 3.9 MG/DL (ref 4.4–5.2)
CA-I BLD-MCNC: 4 MG/DL (ref 4.4–5.2)
CA-I BLD-MCNC: 4.1 MG/DL (ref 4.4–5.2)
CA-I BLD-MCNC: 4.1 MG/DL (ref 4.4–5.2)
CA-I BLD-MCNC: 4.2 MG/DL (ref 4.4–5.2)
CA-I BLD-MCNC: 4.3 MG/DL (ref 4.4–5.2)
CA-I BLD-MCNC: 4.5 MG/DL (ref 4.4–5.2)
CA-I BLD-MCNC: 4.7 MG/DL (ref 4.4–5.2)
CA-I BLD-MCNC: 4.8 MG/DL (ref 4.4–5.2)
CALCIUM SERPL-MCNC: 7.8 MG/DL (ref 8.6–10)
CALCIUM SERPL-MCNC: 7.8 MG/DL (ref 8.6–10)
CALCIUM SERPL-MCNC: 8.7 MG/DL (ref 8.6–10)
CALCIUM SERPL-MCNC: 8.9 MG/DL (ref 8.6–10)
CHLORIDE SERPL-SCNC: 89 MMOL/L (ref 98–107)
CHLORIDE SERPL-SCNC: 96 MMOL/L (ref 98–107)
CHLORIDE SERPL-SCNC: 97 MMOL/L (ref 98–107)
CHLORIDE SERPL-SCNC: 97 MMOL/L (ref 98–107)
CLOT INIT KAOL IND TO POST HEP NEUT TRTO: 1 {RATIO}
CLOT INIT KAOLIN IND BLD US: 146 SEC (ref 113–166)
CLOT INIT KAOLIN IND P HEP NEUT BLD US: 139 SEC (ref 103–153)
CLOT STIFF PLT CONT BLD CALC: 17.5 HPA (ref 11.9–29.8)
CLOT STIFF TF IND P HEP NEUT BLD US: 19.3 HPA (ref 13–33.2)
CLOT STIFF TF IND+IIB-IIIA INH P HEP NEU: 1.8 HPA (ref 1–3.7)
CODING SYSTEM: NORMAL
CODING SYSTEM: NORMAL
CREAT SERPL-MCNC: 0.89 MG/DL (ref 0.67–1.17)
CREAT SERPL-MCNC: 0.89 MG/DL (ref 0.67–1.17)
CREAT SERPL-MCNC: 1.04 MG/DL (ref 0.67–1.17)
CREAT SERPL-MCNC: 1.08 MG/DL (ref 0.67–1.17)
CROSSMATCH: NORMAL
CROSSMATCH: NORMAL
DEPRECATED HCO3 PLAS-SCNC: 25 MMOL/L (ref 22–29)
DEPRECATED HCO3 PLAS-SCNC: 32 MMOL/L (ref 22–29)
EOSINOPHIL # BLD AUTO: 0.4 10E3/UL (ref 0–0.7)
EOSINOPHIL NFR BLD AUTO: 3 %
ERYTHROCYTE [DISTWIDTH] IN BLOOD BY AUTOMATED COUNT: 14.6 % (ref 10–15)
ERYTHROCYTE [DISTWIDTH] IN BLOOD BY AUTOMATED COUNT: 14.7 % (ref 10–15)
FIBRINOGEN PPP-MCNC: 267 MG/DL (ref 170–490)
FIBRINOGEN PPP-MCNC: 291 MG/DL (ref 170–490)
GFR SERPL CREATININE-BSD FRML MDRD: 81 ML/MIN/1.73M2
GFR SERPL CREATININE-BSD FRML MDRD: 85 ML/MIN/1.73M2
GFR SERPL CREATININE-BSD FRML MDRD: >90 ML/MIN/1.73M2
GFR SERPL CREATININE-BSD FRML MDRD: >90 ML/MIN/1.73M2
GLUCOSE BLD-MCNC: 106 MG/DL (ref 70–99)
GLUCOSE BLD-MCNC: 114 MG/DL (ref 70–99)
GLUCOSE BLD-MCNC: 150 MG/DL (ref 70–99)
GLUCOSE BLD-MCNC: 156 MG/DL (ref 70–99)
GLUCOSE BLD-MCNC: 160 MG/DL (ref 70–99)
GLUCOSE BLD-MCNC: 166 MG/DL (ref 70–99)
GLUCOSE BLD-MCNC: 168 MG/DL (ref 70–99)
GLUCOSE BLD-MCNC: 193 MG/DL (ref 70–99)
GLUCOSE BLDC GLUCOMTR-MCNC: 109 MG/DL (ref 70–99)
GLUCOSE BLDC GLUCOMTR-MCNC: 120 MG/DL (ref 70–99)
GLUCOSE BLDC GLUCOMTR-MCNC: 122 MG/DL (ref 70–99)
GLUCOSE BLDC GLUCOMTR-MCNC: 127 MG/DL (ref 70–99)
GLUCOSE BLDC GLUCOMTR-MCNC: 127 MG/DL (ref 70–99)
GLUCOSE BLDC GLUCOMTR-MCNC: 138 MG/DL (ref 70–99)
GLUCOSE BLDC GLUCOMTR-MCNC: 144 MG/DL (ref 70–99)
GLUCOSE BLDC GLUCOMTR-MCNC: 82 MG/DL (ref 70–99)
GLUCOSE BLDC GLUCOMTR-MCNC: 92 MG/DL (ref 70–99)
GLUCOSE SERPL-MCNC: 111 MG/DL (ref 70–99)
GLUCOSE SERPL-MCNC: 111 MG/DL (ref 70–99)
GLUCOSE SERPL-MCNC: 125 MG/DL (ref 70–99)
GLUCOSE SERPL-MCNC: 98 MG/DL (ref 70–99)
HCO3 BLD-SCNC: 28 MMOL/L (ref 21–28)
HCO3 BLD-SCNC: 29 MMOL/L (ref 21–28)
HCO3 BLD-SCNC: 30 MMOL/L (ref 21–28)
HCO3 BLD-SCNC: 31 MMOL/L (ref 21–28)
HCO3 BLDA-SCNC: 30 MMOL/L (ref 21–28)
HCO3 BLDA-SCNC: 31 MMOL/L (ref 21–28)
HCO3 BLDA-SCNC: 33 MMOL/L (ref 21–28)
HCO3 BLDA-SCNC: 33 MMOL/L (ref 21–28)
HCO3 BLDA-SCNC: 34 MMOL/L (ref 21–28)
HCO3 BLDA-SCNC: 35 MMOL/L (ref 21–28)
HCO3 BLDA-SCNC: 35 MMOL/L (ref 21–28)
HCO3 BLDV-SCNC: 29 MMOL/L (ref 21–28)
HCO3 BLDV-SCNC: 32 MMOL/L (ref 21–28)
HCO3 BLDV-SCNC: 33 MMOL/L (ref 21–28)
HCO3 BLDV-SCNC: 34 MMOL/L (ref 21–28)
HCO3 BLDV-SCNC: 34 MMOL/L (ref 21–28)
HCO3 BLDV-SCNC: 37 MMOL/L (ref 21–28)
HCO3 BLDV-SCNC: 38 MMOL/L (ref 21–28)
HCO3 BLDV-SCNC: 39 MMOL/L (ref 21–28)
HCT VFR BLD AUTO: 35.6 % (ref 40–53)
HCT VFR BLD AUTO: 39 % (ref 40–53)
HCT VFR BLD AUTO: 39.8 % (ref 40–53)
HCT VFR BLD AUTO: 42.5 % (ref 40–53)
HGB BLD-MCNC: 11.2 G/DL (ref 13.3–17.7)
HGB BLD-MCNC: 11.4 G/DL (ref 13.3–17.7)
HGB BLD-MCNC: 11.5 G/DL (ref 13.3–17.7)
HGB BLD-MCNC: 12.1 G/DL (ref 13.3–17.7)
HGB BLD-MCNC: 12.3 G/DL (ref 13.3–17.7)
HGB BLD-MCNC: 12.6 G/DL (ref 13.3–17.7)
HGB BLD-MCNC: 12.8 G/DL (ref 13.3–17.7)
HGB BLD-MCNC: 13.6 G/DL (ref 13.3–17.7)
HGB BLD-MCNC: 13.8 G/DL (ref 13.3–17.7)
HGB BLD-MCNC: 14.4 G/DL (ref 13.3–17.7)
IMM GRANULOCYTES # BLD: 0.1 10E3/UL
IMM GRANULOCYTES NFR BLD: 1 %
INR PPP: 1.42 (ref 0.85–1.15)
INR PPP: 1.92 (ref 0.85–1.15)
ISSUE DATE AND TIME: NORMAL
ISSUE DATE AND TIME: NORMAL
LACTATE BLD-SCNC: 0.7 MMOL/L
LACTATE BLD-SCNC: 1 MMOL/L
LACTATE BLD-SCNC: 1 MMOL/L
LACTATE BLD-SCNC: 1.2 MMOL/L
LACTATE BLD-SCNC: 1.5 MMOL/L
LACTATE BLD-SCNC: 1.8 MMOL/L
LACTATE BLD-SCNC: 2.4 MMOL/L
LACTATE BLD-SCNC: 3.3 MMOL/L
LACTATE SERPL-SCNC: 1.9 MMOL/L (ref 0.7–2)
LACTATE SERPL-SCNC: 2.2 MMOL/L (ref 0.7–2)
LACTATE SERPL-SCNC: 2.3 MMOL/L (ref 0.7–2)
LACTATE SERPL-SCNC: 2.5 MMOL/L (ref 0.7–2)
LYMPHOCYTES # BLD AUTO: 2.3 10E3/UL (ref 0.8–5.3)
LYMPHOCYTES NFR BLD AUTO: 16 %
MAGNESIUM SERPL-MCNC: 1.8 MG/DL (ref 1.7–2.3)
MAGNESIUM SERPL-MCNC: 2 MG/DL (ref 1.7–2.3)
MAGNESIUM SERPL-MCNC: 2.5 MG/DL (ref 1.7–2.3)
MAGNESIUM SERPL-MCNC: 2.7 MG/DL (ref 1.7–2.3)
MCH RBC QN AUTO: 30 PG (ref 26.5–33)
MCH RBC QN AUTO: 30.3 PG (ref 26.5–33)
MCH RBC QN AUTO: 30.5 PG (ref 26.5–33)
MCH RBC QN AUTO: 30.6 PG (ref 26.5–33)
MCHC RBC AUTO-ENTMCNC: 32 G/DL (ref 31.5–36.5)
MCHC RBC AUTO-ENTMCNC: 32 G/DL (ref 31.5–36.5)
MCHC RBC AUTO-ENTMCNC: 32.2 G/DL (ref 31.5–36.5)
MCHC RBC AUTO-ENTMCNC: 32.3 G/DL (ref 31.5–36.5)
MCV RBC AUTO: 94 FL (ref 78–100)
MCV RBC AUTO: 94 FL (ref 78–100)
MCV RBC AUTO: 95 FL (ref 78–100)
MCV RBC AUTO: 95 FL (ref 78–100)
MDC_IDC_LEAD_IMPLANT_DT: NORMAL
MDC_IDC_LEAD_LOCATION: NORMAL
MDC_IDC_LEAD_LOCATION_DETAIL_1: NORMAL
MDC_IDC_LEAD_MFG: NORMAL
MDC_IDC_LEAD_MODEL: NORMAL
MDC_IDC_LEAD_POLARITY_TYPE: NORMAL
MDC_IDC_LEAD_SERIAL: NORMAL
MDC_IDC_MSMT_BATTERY_DTM: NORMAL
MDC_IDC_MSMT_BATTERY_REMAINING_LONGEVITY: 120 MO
MDC_IDC_MSMT_BATTERY_REMAINING_PERCENTAGE: 100 %
MDC_IDC_MSMT_BATTERY_STATUS: NORMAL
MDC_IDC_MSMT_CAP_CHARGE_DTM: NORMAL
MDC_IDC_MSMT_CAP_CHARGE_TIME: 10.4 S
MDC_IDC_MSMT_CAP_CHARGE_TYPE: NORMAL
MDC_IDC_MSMT_LEADCHNL_RV_IMPEDANCE_VALUE: 414 OHM
MDC_IDC_MSMT_LEADCHNL_RV_PACING_THRESHOLD_AMPLITUDE: 1 V
MDC_IDC_MSMT_LEADCHNL_RV_PACING_THRESHOLD_PULSEWIDTH: 0.4 MS
MDC_IDC_PG_IMPLANT_DTM: NORMAL
MDC_IDC_PG_MFG: NORMAL
MDC_IDC_PG_MODEL: NORMAL
MDC_IDC_PG_SERIAL: NORMAL
MDC_IDC_PG_TYPE: NORMAL
MDC_IDC_SESS_CLINIC_NAME: NORMAL
MDC_IDC_SESS_DTM: NORMAL
MDC_IDC_SESS_TYPE: NORMAL
MDC_IDC_SET_BRADY_LOWRATE: 40 {BEATS}/MIN
MDC_IDC_SET_BRADY_MODE: NORMAL
MDC_IDC_SET_LEADCHNL_RV_PACING_AMPLITUDE: 2 V
MDC_IDC_SET_LEADCHNL_RV_PACING_POLARITY: NORMAL
MDC_IDC_SET_LEADCHNL_RV_PACING_PULSEWIDTH: 0.4 MS
MDC_IDC_SET_LEADCHNL_RV_SENSING_ADAPTATION_MODE: NORMAL
MDC_IDC_SET_LEADCHNL_RV_SENSING_POLARITY: NORMAL
MDC_IDC_SET_LEADCHNL_RV_SENSING_SENSITIVITY: 0.6 MV
MDC_IDC_SET_ZONE_TYPE: NORMAL
MDC_IDC_SET_ZONE_TYPE: NORMAL
MDC_IDC_SET_ZONE_VENDOR_TYPE: NORMAL
MDC_IDC_SET_ZONE_VENDOR_TYPE: NORMAL
MDC_IDC_STAT_BRADY_DTM_END: NORMAL
MDC_IDC_STAT_BRADY_DTM_START: NORMAL
MDC_IDC_STAT_BRADY_RV_PERCENT_PACED: 0 %
MDC_IDC_STAT_EPISODE_RECENT_COUNT: 0
MDC_IDC_STAT_EPISODE_RECENT_COUNT: 0
MDC_IDC_STAT_EPISODE_RECENT_COUNT: 1
MDC_IDC_STAT_EPISODE_RECENT_COUNT: 13
MDC_IDC_STAT_EPISODE_RECENT_COUNT: 4
MDC_IDC_STAT_EPISODE_RECENT_COUNT_DTM_END: NORMAL
MDC_IDC_STAT_EPISODE_RECENT_COUNT_DTM_START: NORMAL
MDC_IDC_STAT_EPISODE_TYPE: NORMAL
MDC_IDC_STAT_EPISODE_VENDOR_TYPE: NORMAL
MDC_IDC_STAT_TACHYTHERAPY_ATP_DELIVERED_RECENT: 1
MDC_IDC_STAT_TACHYTHERAPY_ATP_DELIVERED_TOTAL: 1
MDC_IDC_STAT_TACHYTHERAPY_RECENT_DTM_END: NORMAL
MDC_IDC_STAT_TACHYTHERAPY_RECENT_DTM_START: NORMAL
MDC_IDC_STAT_TACHYTHERAPY_SHOCKS_ABORTED_RECENT: 0
MDC_IDC_STAT_TACHYTHERAPY_SHOCKS_ABORTED_TOTAL: 0
MDC_IDC_STAT_TACHYTHERAPY_SHOCKS_DELIVERED_RECENT: 0
MDC_IDC_STAT_TACHYTHERAPY_SHOCKS_DELIVERED_TOTAL: 0
MDC_IDC_STAT_TACHYTHERAPY_TOTAL_DTM_END: NORMAL
MDC_IDC_STAT_TACHYTHERAPY_TOTAL_DTM_START: NORMAL
MONOCYTES # BLD AUTO: 1.9 10E3/UL (ref 0–1.3)
MONOCYTES NFR BLD AUTO: 14 %
NEUTROPHILS # BLD AUTO: 9.3 10E3/UL (ref 1.6–8.3)
NEUTROPHILS NFR BLD AUTO: 65 %
NRBC # BLD AUTO: 0 10E3/UL
NRBC BLD AUTO-RTO: 0 /100
O2/TOTAL GAS SETTING VFR VENT: 100 %
O2/TOTAL GAS SETTING VFR VENT: 100 %
O2/TOTAL GAS SETTING VFR VENT: 2 %
O2/TOTAL GAS SETTING VFR VENT: 2 %
O2/TOTAL GAS SETTING VFR VENT: 35 %
O2/TOTAL GAS SETTING VFR VENT: 40 %
O2/TOTAL GAS SETTING VFR VENT: 50 %
O2/TOTAL GAS SETTING VFR VENT: 70 %
O2/TOTAL GAS SETTING VFR VENT: 70 %
O2/TOTAL GAS SETTING VFR VENT: 80 %
OXYHGB MFR BLD: 98 % (ref 92–100)
OXYHGB MFR BLD: 99 % (ref 92–100)
OXYHGB MFR BLDV: 62 % (ref 70–75)
OXYHGB MFR BLDV: 64 % (ref 70–75)
OXYHGB MFR BLDV: 64 % (ref 70–75)
OXYHGB MFR BLDV: 65 % (ref 70–75)
OXYHGB MFR BLDV: 67 % (ref 70–75)
OXYHGB MFR BLDV: 68 % (ref 70–75)
OXYHGB MFR BLDV: 76 % (ref 70–75)
PCO2 BLD: 42 MM HG (ref 35–45)
PCO2 BLD: 42 MM HG (ref 35–45)
PCO2 BLD: 44 MM HG (ref 35–45)
PCO2 BLD: 56 MM HG (ref 35–45)
PCO2 BLDA: 42 MM HG (ref 35–45)
PCO2 BLDA: 43 MM HG (ref 35–45)
PCO2 BLDA: 46 MM HG (ref 35–45)
PCO2 BLDA: 50 MM HG (ref 35–45)
PCO2 BLDA: 52 MM HG (ref 35–45)
PCO2 BLDA: 54 MM HG (ref 35–45)
PCO2 BLDA: 55 MM HG (ref 35–45)
PCO2 BLDV: 46 MM HG (ref 40–50)
PCO2 BLDV: 50 MM HG (ref 40–50)
PCO2 BLDV: 53 MM HG (ref 40–50)
PCO2 BLDV: 54 MM HG (ref 40–50)
PCO2 BLDV: 55 MM HG (ref 40–50)
PCO2 BLDV: 56 MM HG (ref 40–50)
PCO2 BLDV: 63 MM HG (ref 40–50)
PCO2 BLDV: 67 MM HG (ref 40–50)
PH BLD: 7.35 [PH] (ref 7.35–7.45)
PH BLD: 7.44 [PH] (ref 7.35–7.45)
PH BLD: 7.44 [PH] (ref 7.35–7.45)
PH BLD: 7.45 [PH] (ref 7.35–7.45)
PH BLDA: 7.37 [PH] (ref 7.35–7.45)
PH BLDA: 7.41 [PH] (ref 7.35–7.45)
PH BLDA: 7.42 [PH] (ref 7.35–7.45)
PH BLDA: 7.43 [PH] (ref 7.35–7.45)
PH BLDA: 7.46 [PH] (ref 7.35–7.45)
PH BLDA: 7.48 [PH] (ref 7.35–7.45)
PH BLDA: 7.51 [PH] (ref 7.35–7.45)
PH BLDV: 7.31 [PH] (ref 7.32–7.43)
PH BLDV: 7.34 [PH] (ref 7.32–7.43)
PH BLDV: 7.4 [PH] (ref 7.32–7.43)
PH BLDV: 7.41 [PH] (ref 7.32–7.43)
PH BLDV: 7.42 [PH] (ref 7.32–7.43)
PH BLDV: 7.42 [PH] (ref 7.32–7.43)
PH BLDV: 7.46 [PH] (ref 7.32–7.43)
PH BLDV: 7.46 [PH] (ref 7.32–7.43)
PHOSPHATE SERPL-MCNC: 2.3 MG/DL (ref 2.5–4.5)
PHOSPHATE SERPL-MCNC: 3.4 MG/DL (ref 2.5–4.5)
PLAT MORPH BLD: NORMAL
PLATELET # BLD AUTO: 102 10E3/UL (ref 150–450)
PLATELET # BLD AUTO: 165 10E3/UL (ref 150–450)
PLATELET # BLD AUTO: 186 10E3/UL (ref 150–450)
PLATELET # BLD AUTO: 219 10E3/UL (ref 150–450)
PLPETH BLD-MCNC: <10 NG/ML
PO2 BLD: 146 MM HG (ref 80–105)
PO2 BLD: 153 MM HG (ref 80–105)
PO2 BLD: 177 MM HG (ref 80–105)
PO2 BLD: 214 MM HG (ref 80–105)
PO2 BLDA: 206 MM HG (ref 80–105)
PO2 BLDA: 272 MM HG (ref 80–105)
PO2 BLDA: 323 MM HG (ref 80–105)
PO2 BLDA: 381 MM HG (ref 80–105)
PO2 BLDA: 408 MM HG (ref 80–105)
PO2 BLDA: 409 MM HG (ref 80–105)
PO2 BLDA: 473 MM HG (ref 80–105)
PO2 BLDV: 34 MM HG (ref 25–47)
PO2 BLDV: 35 MM HG (ref 25–47)
PO2 BLDV: 35 MM HG (ref 25–47)
PO2 BLDV: 37 MM HG (ref 25–47)
PO2 BLDV: 37 MM HG (ref 25–47)
PO2 BLDV: 41 MM HG (ref 25–47)
PO2 BLDV: 42 MM HG (ref 25–47)
PO2 BLDV: 50 MM HG (ref 25–47)
POPETH BLD-MCNC: <10 NG/ML
POTASSIUM BLD-SCNC: 3.2 MMOL/L (ref 3.5–5)
POTASSIUM BLD-SCNC: 3.2 MMOL/L (ref 3.5–5)
POTASSIUM BLD-SCNC: 3.3 MMOL/L (ref 3.5–5)
POTASSIUM BLD-SCNC: 3.3 MMOL/L (ref 3.5–5)
POTASSIUM BLD-SCNC: 3.4 MMOL/L (ref 3.5–5)
POTASSIUM BLD-SCNC: 3.5 MMOL/L (ref 3.5–5)
POTASSIUM BLD-SCNC: 3.5 MMOL/L (ref 3.5–5)
POTASSIUM BLD-SCNC: 7.6 MMOL/L (ref 3.5–5)
POTASSIUM SERPL-SCNC: 3.2 MMOL/L (ref 3.4–5.3)
POTASSIUM SERPL-SCNC: 3.3 MMOL/L (ref 3.4–5.3)
POTASSIUM SERPL-SCNC: 3.3 MMOL/L (ref 3.4–5.3)
POTASSIUM SERPL-SCNC: 3.6 MMOL/L (ref 3.4–5.3)
POTASSIUM SERPL-SCNC: 3.6 MMOL/L (ref 3.4–5.3)
POTASSIUM SERPL-SCNC: 4.5 MMOL/L (ref 3.4–5.3)
POTASSIUM SERPL-SCNC: 4.5 MMOL/L (ref 3.4–5.3)
PROT SERPL-MCNC: 4.6 G/DL (ref 6.4–8.3)
RBC # BLD AUTO: 3.74 10E6/UL (ref 4.4–5.9)
RBC # BLD AUTO: 4.12 10E6/UL (ref 4.4–5.9)
RBC # BLD AUTO: 4.22 10E6/UL (ref 4.4–5.9)
RBC # BLD AUTO: 4.54 10E6/UL (ref 4.4–5.9)
RBC MORPH BLD: NORMAL
SODIUM BLD-SCNC: 127 MMOL/L (ref 133–144)
SODIUM BLD-SCNC: 127 MMOL/L (ref 133–144)
SODIUM BLD-SCNC: 128 MMOL/L (ref 133–144)
SODIUM BLD-SCNC: 129 MMOL/L (ref 133–144)
SODIUM BLD-SCNC: 130 MMOL/L (ref 133–144)
SODIUM BLD-SCNC: 130 MMOL/L (ref 133–144)
SODIUM BLD-SCNC: 131 MMOL/L (ref 133–144)
SODIUM BLD-SCNC: 133 MMOL/L (ref 133–144)
SODIUM SERPL-SCNC: 132 MMOL/L (ref 136–145)
SODIUM SERPL-SCNC: 134 MMOL/L (ref 136–145)
SODIUM SERPL-SCNC: 135 MMOL/L (ref 136–145)
SODIUM SERPL-SCNC: 135 MMOL/L (ref 136–145)
SPECIMEN EXPIRATION DATE: NORMAL
UNIT ABO/RH: NORMAL
UNIT ABO/RH: NORMAL
UNIT NUMBER: NORMAL
UNIT NUMBER: NORMAL
UNIT STATUS: NORMAL
UNIT STATUS: NORMAL
UNIT TYPE ISBT: 5100
UNIT TYPE ISBT: 5100
WBC # BLD AUTO: 14 10E3/UL (ref 4–11)
WBC # BLD AUTO: 21.2 10E3/UL (ref 4–11)
WBC # BLD AUTO: 24 10E3/UL (ref 4–11)
WBC # BLD AUTO: 31.9 10E3/UL (ref 4–11)

## 2023-03-23 PROCEDURE — 258N000003 HC RX IP 258 OP 636: Performed by: SURGERY

## 2023-03-23 PROCEDURE — 250N000013 HC RX MED GY IP 250 OP 250 PS 637

## 2023-03-23 PROCEDURE — 85396 CLOTTING ASSAY WHOLE BLOOD: CPT

## 2023-03-23 PROCEDURE — 274N000002 HC DEVICE HM 14-V LITH BATTERY CLIP, INITIAL ONLY

## 2023-03-23 PROCEDURE — 250N000011 HC RX IP 250 OP 636

## 2023-03-23 PROCEDURE — 94799 UNLISTED PULMONARY SVC/PX: CPT

## 2023-03-23 PROCEDURE — 83735 ASSAY OF MAGNESIUM: CPT | Performed by: STUDENT IN AN ORGANIZED HEALTH CARE EDUCATION/TRAINING PROGRAM

## 2023-03-23 PROCEDURE — 02HA0QZ INSERTION OF IMPLANTABLE HEART ASSIST SYSTEM INTO HEART, OPEN APPROACH: ICD-10-PCS | Performed by: STUDENT IN AN ORGANIZED HEALTH CARE EDUCATION/TRAINING PROGRAM

## 2023-03-23 PROCEDURE — 5A1221Z PERFORMANCE OF CARDIAC OUTPUT, CONTINUOUS: ICD-10-PCS | Performed by: STUDENT IN AN ORGANIZED HEALTH CARE EDUCATION/TRAINING PROGRAM

## 2023-03-23 PROCEDURE — 999N000065 XR ABDOMEN PORT 1 VIEW

## 2023-03-23 PROCEDURE — 93005 ELECTROCARDIOGRAM TRACING: CPT

## 2023-03-23 PROCEDURE — 258N000003 HC RX IP 258 OP 636

## 2023-03-23 PROCEDURE — 82805 BLOOD GASES W/O2 SATURATION: CPT | Performed by: STUDENT IN AN ORGANIZED HEALTH CARE EDUCATION/TRAINING PROGRAM

## 2023-03-23 PROCEDURE — 272N000001 HC OR GENERAL SUPPLY STERILE: Performed by: STUDENT IN AN ORGANIZED HEALTH CARE EDUCATION/TRAINING PROGRAM

## 2023-03-23 PROCEDURE — 84295 ASSAY OF SERUM SODIUM: CPT | Performed by: INTERNAL MEDICINE

## 2023-03-23 PROCEDURE — 250N000011 HC RX IP 250 OP 636: Performed by: SURGERY

## 2023-03-23 PROCEDURE — 258N000003 HC RX IP 258 OP 636: Performed by: STUDENT IN AN ORGANIZED HEALTH CARE EDUCATION/TRAINING PROGRAM

## 2023-03-23 PROCEDURE — 85384 FIBRINOGEN ACTIVITY: CPT | Performed by: STUDENT IN AN ORGANIZED HEALTH CARE EDUCATION/TRAINING PROGRAM

## 2023-03-23 PROCEDURE — 71045 X-RAY EXAM CHEST 1 VIEW: CPT | Mod: 26 | Performed by: RADIOLOGY

## 2023-03-23 PROCEDURE — 999N000045 HC STATISTIC DAILY SWAN MONITORING

## 2023-03-23 PROCEDURE — 85025 COMPLETE CBC W/AUTO DIFF WBC: CPT | Performed by: STUDENT IN AN ORGANIZED HEALTH CARE EDUCATION/TRAINING PROGRAM

## 2023-03-23 PROCEDURE — 84295 ASSAY OF SERUM SODIUM: CPT | Performed by: STUDENT IN AN ORGANIZED HEALTH CARE EDUCATION/TRAINING PROGRAM

## 2023-03-23 PROCEDURE — 250N000009 HC RX 250: Performed by: STUDENT IN AN ORGANIZED HEALTH CARE EDUCATION/TRAINING PROGRAM

## 2023-03-23 PROCEDURE — 86901 BLOOD TYPING SEROLOGIC RH(D): CPT | Performed by: SURGERY

## 2023-03-23 PROCEDURE — 99291 CRITICAL CARE FIRST HOUR: CPT | Mod: 25 | Performed by: INTERNAL MEDICINE

## 2023-03-23 PROCEDURE — 250N000011 HC RX IP 250 OP 636: Performed by: THORACIC SURGERY (CARDIOTHORACIC VASCULAR SURGERY)

## 2023-03-23 PROCEDURE — 370N000017 HC ANESTHESIA TECHNICAL FEE, PER MIN: Performed by: STUDENT IN AN ORGANIZED HEALTH CARE EDUCATION/TRAINING PROGRAM

## 2023-03-23 PROCEDURE — 250N000009 HC RX 250

## 2023-03-23 PROCEDURE — 85396 CLOTTING ASSAY WHOLE BLOOD: CPT | Performed by: INTERNAL MEDICINE

## 2023-03-23 PROCEDURE — 250N000011 HC RX IP 250 OP 636: Performed by: STUDENT IN AN ORGANIZED HEALTH CARE EDUCATION/TRAINING PROGRAM

## 2023-03-23 PROCEDURE — 250N000009 HC RX 250: Performed by: ANESTHESIOLOGY

## 2023-03-23 PROCEDURE — 272N000088 HC PUMP APP ADULT PERFUSION: Performed by: STUDENT IN AN ORGANIZED HEALTH CARE EDUCATION/TRAINING PROGRAM

## 2023-03-23 PROCEDURE — 85027 COMPLETE CBC AUTOMATED: CPT | Performed by: INTERNAL MEDICINE

## 2023-03-23 PROCEDURE — 80053 COMPREHEN METABOLIC PANEL: CPT | Performed by: STUDENT IN AN ORGANIZED HEALTH CARE EDUCATION/TRAINING PROGRAM

## 2023-03-23 PROCEDURE — 85384 FIBRINOGEN ACTIVITY: CPT | Performed by: INTERNAL MEDICINE

## 2023-03-23 PROCEDURE — 410N000004: Performed by: STUDENT IN AN ORGANIZED HEALTH CARE EDUCATION/TRAINING PROGRAM

## 2023-03-23 PROCEDURE — 274N000011 HC DEVICE HM III IMPLANT KIT

## 2023-03-23 PROCEDURE — 93287 PERI-PX DEVICE EVAL & PRGR: CPT

## 2023-03-23 PROCEDURE — 82803 BLOOD GASES ANY COMBINATION: CPT

## 2023-03-23 PROCEDURE — 82330 ASSAY OF CALCIUM: CPT | Performed by: STUDENT IN AN ORGANIZED HEALTH CARE EDUCATION/TRAINING PROGRAM

## 2023-03-23 PROCEDURE — 84155 ASSAY OF PROTEIN SERUM: CPT | Performed by: STUDENT IN AN ORGANIZED HEALTH CARE EDUCATION/TRAINING PROGRAM

## 2023-03-23 PROCEDURE — 82330 ASSAY OF CALCIUM: CPT

## 2023-03-23 PROCEDURE — 85027 COMPLETE CBC AUTOMATED: CPT | Performed by: STUDENT IN AN ORGANIZED HEALTH CARE EDUCATION/TRAINING PROGRAM

## 2023-03-23 PROCEDURE — 274N000010 HC DEVICE HM III CONTROLLER, INITIAL ONLY, EACH

## 2023-03-23 PROCEDURE — 85730 THROMBOPLASTIN TIME PARTIAL: CPT | Performed by: INTERNAL MEDICINE

## 2023-03-23 PROCEDURE — 999N000155 HC STATISTIC RAPCV CVP MONITORING

## 2023-03-23 PROCEDURE — 360N000079 HC SURGERY LEVEL 6, PER MIN: Performed by: STUDENT IN AN ORGANIZED HEALTH CARE EDUCATION/TRAINING PROGRAM

## 2023-03-23 PROCEDURE — 74018 RADEX ABDOMEN 1 VIEW: CPT | Mod: 26 | Performed by: RADIOLOGY

## 2023-03-23 PROCEDURE — 250N000011 HC RX IP 250 OP 636: Performed by: INTERNAL MEDICINE

## 2023-03-23 PROCEDURE — 84450 TRANSFERASE (AST) (SGOT): CPT | Performed by: STUDENT IN AN ORGANIZED HEALTH CARE EDUCATION/TRAINING PROGRAM

## 2023-03-23 PROCEDURE — 999N000075 HC STATISTIC IABP MONITORING

## 2023-03-23 PROCEDURE — 410N000003 HC PER-PERFUSION 1ST 30 MIN: Performed by: STUDENT IN AN ORGANIZED HEALTH CARE EDUCATION/TRAINING PROGRAM

## 2023-03-23 PROCEDURE — 71045 X-RAY EXAM CHEST 1 VIEW: CPT

## 2023-03-23 PROCEDURE — 02UJ0JZ SUPPLEMENT TRICUSPID VALVE WITH SYNTHETIC SUBSTITUTE, OPEN APPROACH: ICD-10-PCS | Performed by: STUDENT IN AN ORGANIZED HEALTH CARE EDUCATION/TRAINING PROGRAM

## 2023-03-23 PROCEDURE — 85730 THROMBOPLASTIN TIME PARTIAL: CPT | Performed by: STUDENT IN AN ORGANIZED HEALTH CARE EDUCATION/TRAINING PROGRAM

## 2023-03-23 PROCEDURE — 278N000051 HC OR IMPLANT GENERAL: Performed by: STUDENT IN AN ORGANIZED HEALTH CARE EDUCATION/TRAINING PROGRAM

## 2023-03-23 PROCEDURE — 274N000005 HC DEVICE HM POCKET BATTERY HOLSTER, INITIAL ONLY

## 2023-03-23 PROCEDURE — 86923 COMPATIBILITY TEST ELECTRIC: CPT

## 2023-03-23 PROCEDURE — 250N000024 HC ISOFLURANE, PER MIN: Performed by: STUDENT IN AN ORGANIZED HEALTH CARE EDUCATION/TRAINING PROGRAM

## 2023-03-23 PROCEDURE — 250N000013 HC RX MED GY IP 250 OP 250 PS 637: Performed by: INTERNAL MEDICINE

## 2023-03-23 PROCEDURE — 84100 ASSAY OF PHOSPHORUS: CPT | Performed by: STUDENT IN AN ORGANIZED HEALTH CARE EDUCATION/TRAINING PROGRAM

## 2023-03-23 PROCEDURE — 71045 X-RAY EXAM CHEST 1 VIEW: CPT | Mod: 77

## 2023-03-23 PROCEDURE — 999N000185 HC STATISTIC TRANSPORT TIME EA 15 MIN

## 2023-03-23 PROCEDURE — 258N000003 HC RX IP 258 OP 636: Performed by: THORACIC SURGERY (CARDIOTHORACIC VASCULAR SURGERY)

## 2023-03-23 PROCEDURE — 84132 ASSAY OF SERUM POTASSIUM: CPT | Performed by: STUDENT IN AN ORGANIZED HEALTH CARE EDUCATION/TRAINING PROGRAM

## 2023-03-23 PROCEDURE — 200N000002 HC R&B ICU UMMC

## 2023-03-23 PROCEDURE — 84132 ASSAY OF SERUM POTASSIUM: CPT | Performed by: THORACIC SURGERY (CARDIOTHORACIC VASCULAR SURGERY)

## 2023-03-23 PROCEDURE — 274N000003 HC DEVICE HM 14-V LITHIUM BATTERY, INITIAL ONLY, EACH

## 2023-03-23 PROCEDURE — 250N000009 HC RX 250: Performed by: THORACIC SURGERY (CARDIOTHORACIC VASCULAR SURGERY)

## 2023-03-23 PROCEDURE — 83605 ASSAY OF LACTIC ACID: CPT | Performed by: STUDENT IN AN ORGANIZED HEALTH CARE EDUCATION/TRAINING PROGRAM

## 2023-03-23 PROCEDURE — 88307 TISSUE EXAM BY PATHOLOGIST: CPT | Mod: 26 | Performed by: STUDENT IN AN ORGANIZED HEALTH CARE EDUCATION/TRAINING PROGRAM

## 2023-03-23 PROCEDURE — 99292 CRITICAL CARE ADDL 30 MIN: CPT | Performed by: INTERNAL MEDICINE

## 2023-03-23 PROCEDURE — C1713 ANCHOR/SCREW BN/BN,TIS/BN: HCPCS | Performed by: STUDENT IN AN ORGANIZED HEALTH CARE EDUCATION/TRAINING PROGRAM

## 2023-03-23 PROCEDURE — 250N000013 HC RX MED GY IP 250 OP 250 PS 637: Performed by: STUDENT IN AN ORGANIZED HEALTH CARE EDUCATION/TRAINING PROGRAM

## 2023-03-23 PROCEDURE — 272N000085 HC PACK CELL SAVER CSP: Performed by: STUDENT IN AN ORGANIZED HEALTH CARE EDUCATION/TRAINING PROGRAM

## 2023-03-23 PROCEDURE — 94002 VENT MGMT INPAT INIT DAY: CPT

## 2023-03-23 PROCEDURE — 83735 ASSAY OF MAGNESIUM: CPT | Performed by: INTERNAL MEDICINE

## 2023-03-23 PROCEDURE — 85610 PROTHROMBIN TIME: CPT | Performed by: INTERNAL MEDICINE

## 2023-03-23 PROCEDURE — 250N000009 HC RX 250: Performed by: SURGERY

## 2023-03-23 PROCEDURE — 33968 REMOVE AORTIC ASSIST DEVICE: CPT

## 2023-03-23 PROCEDURE — 258N000003 HC RX IP 258 OP 636: Performed by: INTERNAL MEDICINE

## 2023-03-23 PROCEDURE — 93750 INTERROGATION VAD IN PERSON: CPT | Performed by: INTERNAL MEDICINE

## 2023-03-23 PROCEDURE — 85610 PROTHROMBIN TIME: CPT | Performed by: STUDENT IN AN ORGANIZED HEALTH CARE EDUCATION/TRAINING PROGRAM

## 2023-03-23 PROCEDURE — 93010 ELECTROCARDIOGRAM REPORT: CPT | Mod: 59 | Performed by: INTERNAL MEDICINE

## 2023-03-23 PROCEDURE — 84132 ASSAY OF SERUM POTASSIUM: CPT

## 2023-03-23 PROCEDURE — 999N000157 HC STATISTIC RCP TIME EA 10 MIN

## 2023-03-23 PROCEDURE — 88307 TISSUE EXAM BY PATHOLOGIST: CPT | Mod: TC | Performed by: STUDENT IN AN ORGANIZED HEALTH CARE EDUCATION/TRAINING PROGRAM

## 2023-03-23 PROCEDURE — 02LR0ZT OCCLUSION OF DUCTUS ARTERIOSUS, OPEN APPROACH: ICD-10-PCS | Performed by: STUDENT IN AN ORGANIZED HEALTH CARE EDUCATION/TRAINING PROGRAM

## 2023-03-23 DEVICE — COR-KNOT® QUICK LOAD® 6-POUCH
Type: IMPLANTABLE DEVICE | Site: HEART | Status: FUNCTIONAL
Brand: COR-KNOT® QUICK LOAD®

## 2023-03-23 DEVICE — COR-KNOT® QUICK LOAD® SINGLES
Type: IMPLANTABLE DEVICE | Site: HEART | Status: FUNCTIONAL
Brand: COR-KNOT® QUICK LOAD®

## 2023-03-23 DEVICE — ANNULOPLASTY RING MC3 TRICUSPID 32MM 4900T32: Type: IMPLANTABLE DEVICE | Site: HEART | Status: FUNCTIONAL

## 2023-03-23 DEVICE — COR-KNOT MINI® COMBO KIT
Type: IMPLANTABLE DEVICE | Site: HEART | Status: FUNCTIONAL
Brand: COR-KNOT MINI®

## 2023-03-23 DEVICE — IMPLANTABLE DEVICE: Type: IMPLANTABLE DEVICE | Site: HEART | Status: FUNCTIONAL

## 2023-03-23 RX ORDER — PHENYLEPHRINE HCL IN 0.9% NACL 50MG/250ML
.1-6 PLASTIC BAG, INJECTION (ML) INTRAVENOUS CONTINUOUS
Status: DISCONTINUED | OUTPATIENT
Start: 2023-03-23 | End: 2023-03-23 | Stop reason: HOSPADM

## 2023-03-23 RX ORDER — ACETAMINOPHEN 325 MG/1
975 TABLET ORAL EVERY 8 HOURS SCHEDULED
Status: DISCONTINUED | OUTPATIENT
Start: 2023-03-23 | End: 2023-04-09 | Stop reason: HOSPADM

## 2023-03-23 RX ORDER — DEXMEDETOMIDINE HYDROCHLORIDE 4 UG/ML
.1-1.2 INJECTION, SOLUTION INTRAVENOUS CONTINUOUS
Status: DISCONTINUED | OUTPATIENT
Start: 2023-03-23 | End: 2023-03-23 | Stop reason: HOSPADM

## 2023-03-23 RX ORDER — NALOXONE HYDROCHLORIDE 0.4 MG/ML
0.4 INJECTION, SOLUTION INTRAMUSCULAR; INTRAVENOUS; SUBCUTANEOUS
Status: DISCONTINUED | OUTPATIENT
Start: 2023-03-23 | End: 2023-04-09 | Stop reason: HOSPADM

## 2023-03-23 RX ORDER — BISACODYL 10 MG
10 SUPPOSITORY, RECTAL RECTAL DAILY PRN
Status: DISCONTINUED | OUTPATIENT
Start: 2023-03-23 | End: 2023-04-09 | Stop reason: HOSPADM

## 2023-03-23 RX ORDER — POLYETHYLENE GLYCOL 3350 17 G/17G
17 POWDER, FOR SOLUTION ORAL DAILY
Status: DISCONTINUED | OUTPATIENT
Start: 2023-03-24 | End: 2023-03-26

## 2023-03-23 RX ORDER — PROPOFOL 10 MG/ML
INJECTION, EMULSION INTRAVENOUS PRN
Status: DISCONTINUED | OUTPATIENT
Start: 2023-03-23 | End: 2023-03-23

## 2023-03-23 RX ORDER — MUPIROCIN 20 MG/G
1 OINTMENT TOPICAL 2 TIMES DAILY
Status: DISCONTINUED | OUTPATIENT
Start: 2023-03-23 | End: 2023-03-23

## 2023-03-23 RX ORDER — MAGNESIUM SULFATE HEPTAHYDRATE 40 MG/ML
2 INJECTION, SOLUTION INTRAVENOUS ONCE
Status: COMPLETED | OUTPATIENT
Start: 2023-03-23 | End: 2023-03-23

## 2023-03-23 RX ORDER — VASOPRESSIN IN 0.9 % NACL 2 UNIT/2ML
SYRINGE (ML) INTRAVENOUS PRN
Status: DISCONTINUED | OUTPATIENT
Start: 2023-03-23 | End: 2023-03-23

## 2023-03-23 RX ORDER — ACETAMINOPHEN 325 MG/1
650 TABLET ORAL EVERY 4 HOURS PRN
Status: DISCONTINUED | OUTPATIENT
Start: 2023-03-26 | End: 2023-04-09 | Stop reason: HOSPADM

## 2023-03-23 RX ORDER — LIDOCAINE HYDROCHLORIDE 20 MG/ML
INJECTION, SOLUTION INFILTRATION; PERINEURAL PRN
Status: DISCONTINUED | OUTPATIENT
Start: 2023-03-23 | End: 2023-03-23

## 2023-03-23 RX ORDER — LEVOFLOXACIN 5 MG/ML
500 INJECTION, SOLUTION INTRAVENOUS EVERY 24 HOURS
Status: COMPLETED | OUTPATIENT
Start: 2023-03-24 | End: 2023-03-25

## 2023-03-23 RX ORDER — DEXTROSE MONOHYDRATE 25 G/50ML
25-50 INJECTION, SOLUTION INTRAVENOUS
Status: DISCONTINUED | OUTPATIENT
Start: 2023-03-23 | End: 2023-03-25

## 2023-03-23 RX ORDER — NICOTINE POLACRILEX 4 MG
15-30 LOZENGE BUCCAL
Status: DISCONTINUED | OUTPATIENT
Start: 2023-03-23 | End: 2023-03-23

## 2023-03-23 RX ORDER — PROCHLORPERAZINE MALEATE 5 MG
10 TABLET ORAL EVERY 6 HOURS PRN
Status: DISCONTINUED | OUTPATIENT
Start: 2023-03-23 | End: 2023-04-09 | Stop reason: HOSPADM

## 2023-03-23 RX ORDER — METHOCARBAMOL 750 MG/1
750 TABLET, FILM COATED ORAL EVERY 6 HOURS PRN
Status: DISCONTINUED | OUTPATIENT
Start: 2023-03-23 | End: 2023-04-03

## 2023-03-23 RX ORDER — POTASSIUM CHLORIDE 29.8 MG/ML
20 INJECTION INTRAVENOUS ONCE
Status: COMPLETED | OUTPATIENT
Start: 2023-03-23 | End: 2023-03-23

## 2023-03-23 RX ORDER — POTASSIUM CHLORIDE 29.8 MG/ML
20 INJECTION INTRAVENOUS
Status: COMPLETED | OUTPATIENT
Start: 2023-03-23 | End: 2023-03-23

## 2023-03-23 RX ORDER — POTASSIUM CHLORIDE 1.5 G/1.58G
20 POWDER, FOR SOLUTION ORAL 2 TIMES DAILY
Status: DISCONTINUED | OUTPATIENT
Start: 2023-03-23 | End: 2023-03-23

## 2023-03-23 RX ORDER — LEVOFLOXACIN 5 MG/ML
500 INJECTION, SOLUTION INTRAVENOUS SEE ADMIN INSTRUCTIONS
Status: DISCONTINUED | OUTPATIENT
Start: 2023-03-23 | End: 2023-03-23 | Stop reason: HOSPADM

## 2023-03-23 RX ORDER — OXYCODONE HYDROCHLORIDE 5 MG/1
5 TABLET ORAL EVERY 4 HOURS PRN
Status: DISCONTINUED | OUTPATIENT
Start: 2023-03-23 | End: 2023-04-09 | Stop reason: HOSPADM

## 2023-03-23 RX ORDER — FIBRINOGEN (HUMAN) 700-1300MG
1 KIT INTRAVENOUS
Status: DISCONTINUED | OUTPATIENT
Start: 2023-03-23 | End: 2023-03-23

## 2023-03-23 RX ORDER — SODIUM CHLORIDE, SODIUM GLUCONATE, SODIUM ACETATE, POTASSIUM CHLORIDE AND MAGNESIUM CHLORIDE 526; 502; 368; 37; 30 MG/100ML; MG/100ML; MG/100ML; MG/100ML; MG/100ML
INJECTION, SOLUTION INTRAVENOUS CONTINUOUS PRN
Status: DISCONTINUED | OUTPATIENT
Start: 2023-03-23 | End: 2023-03-23

## 2023-03-23 RX ORDER — MUPIROCIN 20 MG/G
OINTMENT TOPICAL 2 TIMES DAILY
Status: DISCONTINUED | OUTPATIENT
Start: 2023-03-23 | End: 2023-03-23 | Stop reason: HOSPADM

## 2023-03-23 RX ORDER — GABAPENTIN 100 MG/1
100 CAPSULE ORAL 3 TIMES DAILY
Status: DISCONTINUED | OUTPATIENT
Start: 2023-03-23 | End: 2023-03-25

## 2023-03-23 RX ORDER — PROTAMINE SULFATE 10 MG/ML
INJECTION, SOLUTION INTRAVENOUS PRN
Status: DISCONTINUED | OUTPATIENT
Start: 2023-03-23 | End: 2023-03-23

## 2023-03-23 RX ORDER — PROPOFOL 10 MG/ML
INJECTION, EMULSION INTRAVENOUS CONTINUOUS PRN
Status: DISCONTINUED | OUTPATIENT
Start: 2023-03-23 | End: 2023-03-23

## 2023-03-23 RX ORDER — ONDANSETRON 2 MG/ML
4 INJECTION INTRAMUSCULAR; INTRAVENOUS EVERY 6 HOURS PRN
Status: DISCONTINUED | OUTPATIENT
Start: 2023-03-23 | End: 2023-04-09 | Stop reason: HOSPADM

## 2023-03-23 RX ORDER — HYDRALAZINE HYDROCHLORIDE 20 MG/ML
10 INJECTION INTRAMUSCULAR; INTRAVENOUS EVERY 30 MIN PRN
Status: DISCONTINUED | OUTPATIENT
Start: 2023-03-23 | End: 2023-04-09 | Stop reason: HOSPADM

## 2023-03-23 RX ORDER — FENTANYL CITRATE 50 UG/ML
INJECTION, SOLUTION INTRAMUSCULAR; INTRAVENOUS PRN
Status: DISCONTINUED | OUTPATIENT
Start: 2023-03-23 | End: 2023-03-23

## 2023-03-23 RX ORDER — NOREPINEPHRINE BITARTRATE 0.06 MG/ML
.01-.4 INJECTION, SOLUTION INTRAVENOUS CONTINUOUS
Status: DISCONTINUED | OUTPATIENT
Start: 2023-03-23 | End: 2023-03-24

## 2023-03-23 RX ORDER — FLUCONAZOLE 2 MG/ML
200 INJECTION, SOLUTION INTRAVENOUS EVERY 24 HOURS
Status: COMPLETED | OUTPATIENT
Start: 2023-03-24 | End: 2023-03-25

## 2023-03-23 RX ORDER — PANTOPRAZOLE SODIUM 40 MG/1
40 TABLET, DELAYED RELEASE ORAL DAILY
Status: DISCONTINUED | OUTPATIENT
Start: 2023-03-23 | End: 2023-03-24

## 2023-03-23 RX ORDER — NOREPINEPHRINE BITARTRATE 0.06 MG/ML
.01-.1 INJECTION, SOLUTION INTRAVENOUS CONTINUOUS
Status: DISCONTINUED | OUTPATIENT
Start: 2023-03-23 | End: 2023-03-23 | Stop reason: HOSPADM

## 2023-03-23 RX ORDER — OXYCODONE HYDROCHLORIDE 10 MG/1
10 TABLET ORAL EVERY 4 HOURS PRN
Status: DISCONTINUED | OUTPATIENT
Start: 2023-03-23 | End: 2023-04-09 | Stop reason: HOSPADM

## 2023-03-23 RX ORDER — HYDROMORPHONE HCL IN WATER/PF 6 MG/30 ML
0.4 PATIENT CONTROLLED ANALGESIA SYRINGE INTRAVENOUS
Status: DISCONTINUED | OUTPATIENT
Start: 2023-03-23 | End: 2023-04-06

## 2023-03-23 RX ORDER — ONDANSETRON 4 MG/1
4 TABLET, ORALLY DISINTEGRATING ORAL EVERY 6 HOURS PRN
Status: DISCONTINUED | OUTPATIENT
Start: 2023-03-23 | End: 2023-04-09 | Stop reason: HOSPADM

## 2023-03-23 RX ORDER — FIBRINOGEN (HUMAN) 700-1300MG
2 KIT INTRAVENOUS ONCE
Status: DISCONTINUED | OUTPATIENT
Start: 2023-03-23 | End: 2023-03-23

## 2023-03-23 RX ORDER — CALCIUM GLUCONATE 20 MG/ML
2 INJECTION, SOLUTION INTRAVENOUS
Status: ACTIVE | OUTPATIENT
Start: 2023-03-23 | End: 2023-03-29

## 2023-03-23 RX ORDER — FLUCONAZOLE 2 MG/ML
200 INJECTION, SOLUTION INTRAVENOUS
Status: COMPLETED | OUTPATIENT
Start: 2023-03-23 | End: 2023-03-23

## 2023-03-23 RX ORDER — VANCOMYCIN HYDROCHLORIDE 1 G/200ML
1000 INJECTION, SOLUTION INTRAVENOUS
Status: COMPLETED | OUTPATIENT
Start: 2023-03-23 | End: 2023-03-23

## 2023-03-23 RX ORDER — PROPOFOL 10 MG/ML
5-75 INJECTION, EMULSION INTRAVENOUS CONTINUOUS
Status: DISCONTINUED | OUTPATIENT
Start: 2023-03-23 | End: 2023-03-25

## 2023-03-23 RX ORDER — SODIUM CHLORIDE 9 MG/ML
INJECTION, SOLUTION INTRAVENOUS CONTINUOUS
Status: DISCONTINUED | OUTPATIENT
Start: 2023-03-23 | End: 2023-03-24

## 2023-03-23 RX ORDER — NICOTINE POLACRILEX 4 MG
15-30 LOZENGE BUCCAL
Status: DISCONTINUED | OUTPATIENT
Start: 2023-03-23 | End: 2023-03-25

## 2023-03-23 RX ORDER — PANTOPRAZOLE SODIUM 40 MG/1
40 TABLET, DELAYED RELEASE ORAL
Status: DISCONTINUED | OUTPATIENT
Start: 2023-03-23 | End: 2023-03-23 | Stop reason: HOSPADM

## 2023-03-23 RX ORDER — HYDROMORPHONE HCL IN WATER/PF 6 MG/30 ML
0.2 PATIENT CONTROLLED ANALGESIA SYRINGE INTRAVENOUS
Status: DISCONTINUED | OUTPATIENT
Start: 2023-03-23 | End: 2023-04-06

## 2023-03-23 RX ORDER — MAGNESIUM OXIDE 400 MG/1
400 TABLET ORAL EVERY 4 HOURS
Status: COMPLETED | OUTPATIENT
Start: 2023-03-23 | End: 2023-03-23

## 2023-03-23 RX ORDER — EPINEPHRINE IN SOD CHLOR,ISO 1 MG/10 ML
SYRINGE (ML) INTRAVENOUS PRN
Status: DISCONTINUED | OUTPATIENT
Start: 2023-03-23 | End: 2023-03-23

## 2023-03-23 RX ORDER — CALCIUM CHLORIDE 100 MG/ML
INJECTION INTRAVENOUS; INTRAVENTRICULAR PRN
Status: DISCONTINUED | OUTPATIENT
Start: 2023-03-23 | End: 2023-03-23

## 2023-03-23 RX ORDER — LIDOCAINE 40 MG/G
CREAM TOPICAL
Status: DISCONTINUED | OUTPATIENT
Start: 2023-03-23 | End: 2023-04-09 | Stop reason: HOSPADM

## 2023-03-23 RX ORDER — LEVOFLOXACIN 5 MG/ML
500 INJECTION, SOLUTION INTRAVENOUS
Status: COMPLETED | OUTPATIENT
Start: 2023-03-23 | End: 2023-03-23

## 2023-03-23 RX ORDER — HEPARIN SODIUM 5000 [USP'U]/.5ML
5000 INJECTION, SOLUTION INTRAVENOUS; SUBCUTANEOUS EVERY 8 HOURS
Status: DISCONTINUED | OUTPATIENT
Start: 2023-03-24 | End: 2023-03-24

## 2023-03-23 RX ORDER — NALOXONE HYDROCHLORIDE 0.4 MG/ML
0.2 INJECTION, SOLUTION INTRAMUSCULAR; INTRAVENOUS; SUBCUTANEOUS
Status: DISCONTINUED | OUTPATIENT
Start: 2023-03-23 | End: 2023-04-09 | Stop reason: HOSPADM

## 2023-03-23 RX ORDER — AMOXICILLIN 250 MG
1 CAPSULE ORAL 2 TIMES DAILY
Status: DISCONTINUED | OUTPATIENT
Start: 2023-03-23 | End: 2023-03-26

## 2023-03-23 RX ORDER — HEPARIN SODIUM 1000 [USP'U]/ML
INJECTION, SOLUTION INTRAVENOUS; SUBCUTANEOUS PRN
Status: DISCONTINUED | OUTPATIENT
Start: 2023-03-23 | End: 2023-03-23

## 2023-03-23 RX ORDER — DEXTROSE MONOHYDRATE 25 G/50ML
25-50 INJECTION, SOLUTION INTRAVENOUS
Status: DISCONTINUED | OUTPATIENT
Start: 2023-03-23 | End: 2023-03-23

## 2023-03-23 RX ORDER — ASPIRIN 81 MG/1
81 TABLET, CHEWABLE ORAL DAILY
Status: DISCONTINUED | OUTPATIENT
Start: 2023-03-24 | End: 2023-04-09 | Stop reason: HOSPADM

## 2023-03-23 RX ORDER — ACETAMINOPHEN 325 MG/1
975 TABLET ORAL ONCE
Status: DISCONTINUED | OUTPATIENT
Start: 2023-03-23 | End: 2023-03-23 | Stop reason: HOSPADM

## 2023-03-23 RX ORDER — CALCIUM GLUCONATE 20 MG/ML
1 INJECTION, SOLUTION INTRAVENOUS
Status: DISPENSED | OUTPATIENT
Start: 2023-03-23 | End: 2023-03-29

## 2023-03-23 RX ORDER — DEXMEDETOMIDINE HYDROCHLORIDE 4 UG/ML
.2-.7 INJECTION, SOLUTION INTRAVENOUS CONTINUOUS
Status: DISCONTINUED | OUTPATIENT
Start: 2023-03-23 | End: 2023-03-24

## 2023-03-23 RX ADMIN — FLUCONAZOLE, SODIUM CHLORIDE 200 MG: 2 INJECTION INTRAVENOUS at 08:10

## 2023-03-23 RX ADMIN — EPINEPHRINE 100 MCG: 0.1 INJECTION, SOLUTION ENDOTRACHEAL; INTRACARDIAC; INTRAVENOUS at 11:31

## 2023-03-23 RX ADMIN — LIDOCAINE HYDROCHLORIDE 100 MG: 20 INJECTION, SOLUTION INFILTRATION; PERINEURAL at 11:02

## 2023-03-23 RX ADMIN — PROPOFOL 40 MCG/KG/MIN: 10 INJECTION, EMULSION INTRAVENOUS at 16:31

## 2023-03-23 RX ADMIN — VASOPRESSIN 0.5 UNITS/HR: 20 INJECTION, SOLUTION INTRAMUSCULAR; SUBCUTANEOUS at 08:09

## 2023-03-23 RX ADMIN — OXYCODONE HYDROCHLORIDE 5 MG: 5 TABLET ORAL at 00:00

## 2023-03-23 RX ADMIN — Medication 0.06 MCG/KG/MIN: at 14:02

## 2023-03-23 RX ADMIN — VANCOMYCIN HYDROCHLORIDE 1000 MG: 1 INJECTION, SOLUTION INTRAVENOUS at 08:10

## 2023-03-23 RX ADMIN — RIFAMPIN 600 MG: 600 INJECTION, POWDER, LYOPHILIZED, FOR SOLUTION INTRAVENOUS at 08:10

## 2023-03-23 RX ADMIN — PROPOFOL 50 MG: 10 INJECTION, EMULSION INTRAVENOUS at 08:00

## 2023-03-23 RX ADMIN — PROTAMINE SULFATE 40 MG: 10 INJECTION, SOLUTION INTRAVENOUS at 11:20

## 2023-03-23 RX ADMIN — ANGIOTENSIN II 20 NG/KG/MIN: 2.5 INJECTION INTRAVENOUS at 12:19

## 2023-03-23 RX ADMIN — SENNOSIDES AND DOCUSATE SODIUM 1 TABLET: 8.6; 5 TABLET ORAL at 21:14

## 2023-03-23 RX ADMIN — Medication 50 MCG/HR: at 14:41

## 2023-03-23 RX ADMIN — GABAPENTIN 100 MG: 100 CAPSULE ORAL at 15:50

## 2023-03-23 RX ADMIN — SODIUM CHLORIDE, POTASSIUM CHLORIDE, SODIUM LACTATE AND CALCIUM CHLORIDE 500 ML: 600; 310; 30; 20 INJECTION, SOLUTION INTRAVENOUS at 16:42

## 2023-03-23 RX ADMIN — SODIUM CHLORIDE, POTASSIUM CHLORIDE, SODIUM LACTATE AND CALCIUM CHLORIDE 500 ML: 600; 310; 30; 20 INJECTION, SOLUTION INTRAVENOUS at 21:57

## 2023-03-23 RX ADMIN — ACETAMINOPHEN 975 MG: 325 TABLET, FILM COATED ORAL at 15:50

## 2023-03-23 RX ADMIN — PROPOFOL 40 MCG/KG/MIN: 10 INJECTION, EMULSION INTRAVENOUS at 14:04

## 2023-03-23 RX ADMIN — AMINOCAPROIC ACID 1.25 G/HR: 250 INJECTION, SOLUTION INTRAVENOUS at 10:30

## 2023-03-23 RX ADMIN — EPINEPHRINE 100 MCG: 0.1 INJECTION, SOLUTION ENDOTRACHEAL; INTRACARDIAC; INTRAVENOUS at 11:24

## 2023-03-23 RX ADMIN — MIDAZOLAM 2 MG: 1 INJECTION INTRAMUSCULAR; INTRAVENOUS at 08:00

## 2023-03-23 RX ADMIN — EPINEPHRINE 0.08 MCG/KG/MIN: 1 INJECTION INTRAMUSCULAR; INTRAVENOUS; SUBCUTANEOUS at 10:58

## 2023-03-23 RX ADMIN — POTASSIUM CHLORIDE 20 MEQ: 29.8 INJECTION, SOLUTION INTRAVENOUS at 04:40

## 2023-03-23 RX ADMIN — PHENYLEPHRINE HYDROCHLORIDE 100 MCG: 10 INJECTION INTRAVENOUS at 08:15

## 2023-03-23 RX ADMIN — EPINEPHRINE 25 MCG: 0.1 INJECTION, SOLUTION ENDOTRACHEAL; INTRACARDIAC; INTRAVENOUS at 11:03

## 2023-03-23 RX ADMIN — NOREPINEPHRINE BITARTRATE 12.8 MCG: 1 INJECTION, SOLUTION, CONCENTRATE INTRAVENOUS at 11:58

## 2023-03-23 RX ADMIN — NOREPINEPHRINE BITARTRATE 12.8 MCG: 1 INJECTION, SOLUTION, CONCENTRATE INTRAVENOUS at 11:30

## 2023-03-23 RX ADMIN — MAGNESIUM OXIDE TAB 400 MG (240 MG ELEMENTAL MG) 400 MG: 400 (240 MG) TAB at 00:40

## 2023-03-23 RX ADMIN — NOREPINEPHRINE BITARTRATE 12.8 MCG: 1 INJECTION, SOLUTION, CONCENTRATE INTRAVENOUS at 08:08

## 2023-03-23 RX ADMIN — Medication 2 UNITS: at 11:56

## 2023-03-23 RX ADMIN — PROPOFOL 30 MCG/KG/MIN: 10 INJECTION, EMULSION INTRAVENOUS at 22:09

## 2023-03-23 RX ADMIN — POTASSIUM CHLORIDE 20 MEQ: 29.8 INJECTION, SOLUTION INTRAVENOUS at 00:40

## 2023-03-23 RX ADMIN — FENTANYL CITRATE 200 MCG: 50 INJECTION, SOLUTION INTRAMUSCULAR; INTRAVENOUS at 08:00

## 2023-03-23 RX ADMIN — POTASSIUM PHOSPHATE, MONOBASIC AND POTASSIUM PHOSPHATE, DIBASIC 15 MMOL: 224; 236 INJECTION, SOLUTION INTRAVENOUS at 19:03

## 2023-03-23 RX ADMIN — PROTHROMBIN, COAGULATION FACTOR VII HUMAN, COAGULATION FACTOR IX HUMAN, COAGULATION FACTOR X HUMAN, PROTEIN C, PROTEIN S HUMAN, AND WATER 498 UNITS: KIT at 11:59

## 2023-03-23 RX ADMIN — Medication 20 MG: at 10:21

## 2023-03-23 RX ADMIN — Medication 3 UNITS: at 11:07

## 2023-03-23 RX ADMIN — HUMAN INSULIN 2 UNITS/HR: 100 INJECTION, SOLUTION SUBCUTANEOUS at 09:22

## 2023-03-23 RX ADMIN — SODIUM CHLORIDE, SODIUM GLUCONATE, SODIUM ACETATE, POTASSIUM CHLORIDE AND MAGNESIUM CHLORIDE: 526; 502; 368; 37; 30 INJECTION, SOLUTION INTRAVENOUS at 07:30

## 2023-03-23 RX ADMIN — FENTANYL CITRATE 100 MCG: 50 INJECTION, SOLUTION INTRAMUSCULAR; INTRAVENOUS at 12:50

## 2023-03-23 RX ADMIN — OXYCODONE HYDROCHLORIDE 5 MG: 5 TABLET ORAL at 04:01

## 2023-03-23 RX ADMIN — MAGNESIUM SULFATE IN WATER 2 G: 40 INJECTION, SOLUTION INTRAVENOUS at 05:41

## 2023-03-23 RX ADMIN — Medication 0.03 MCG/KG/MIN: at 08:14

## 2023-03-23 RX ADMIN — VANCOMYCIN HYDROCHLORIDE 1500 MG: 10 INJECTION, POWDER, LYOPHILIZED, FOR SOLUTION INTRAVENOUS at 23:42

## 2023-03-23 RX ADMIN — LEVOFLOXACIN 500 MG: 5 INJECTION, SOLUTION INTRAVENOUS at 08:10

## 2023-03-23 RX ADMIN — SUGAMMADEX 150 MG: 100 INJECTION, SOLUTION INTRAVENOUS at 13:55

## 2023-03-23 RX ADMIN — GABAPENTIN 100 MG: 100 CAPSULE ORAL at 21:08

## 2023-03-23 RX ADMIN — PROTAMINE SULFATE 40 MG: 10 INJECTION, SOLUTION INTRAVENOUS at 11:22

## 2023-03-23 RX ADMIN — NOREPINEPHRINE BITARTRATE 12.8 MCG: 1 INJECTION, SOLUTION, CONCENTRATE INTRAVENOUS at 11:28

## 2023-03-23 RX ADMIN — PROTAMINE SULFATE 20 MG: 10 INJECTION, SOLUTION INTRAVENOUS at 11:23

## 2023-03-23 RX ADMIN — HUMAN INSULIN 1 UNITS/HR: 100 INJECTION, SOLUTION SUBCUTANEOUS at 14:05

## 2023-03-23 RX ADMIN — VASOPRESSIN 2 UNITS/HR: 20 INJECTION, SOLUTION INTRAMUSCULAR; SUBCUTANEOUS at 13:59

## 2023-03-23 RX ADMIN — PROTAMINE SULFATE 20 MG: 10 INJECTION, SOLUTION INTRAVENOUS at 11:18

## 2023-03-23 RX ADMIN — PROTAMINE SULFATE 50 MG: 10 INJECTION, SOLUTION INTRAVENOUS at 11:21

## 2023-03-23 RX ADMIN — HEPARIN SODIUM 31000 UNITS: 1000 INJECTION INTRAVENOUS; SUBCUTANEOUS at 09:14

## 2023-03-23 RX ADMIN — PHENYLEPHRINE HYDROCHLORIDE 100 MCG: 10 INJECTION INTRAVENOUS at 08:00

## 2023-03-23 RX ADMIN — ACETAMINOPHEN 975 MG: 325 TABLET, FILM COATED ORAL at 21:08

## 2023-03-23 RX ADMIN — MAGNESIUM OXIDE TAB 400 MG (240 MG ELEMENTAL MG) 400 MG: 400 (240 MG) TAB at 04:01

## 2023-03-23 RX ADMIN — POTASSIUM CHLORIDE 20 MEQ: 29.8 INJECTION, SOLUTION INTRAVENOUS at 01:42

## 2023-03-23 RX ADMIN — Medication 30 MG: at 09:46

## 2023-03-23 RX ADMIN — Medication 7.5 G: at 09:21

## 2023-03-23 RX ADMIN — Medication 100 MG: at 08:00

## 2023-03-23 RX ADMIN — Medication 1 UNITS: at 08:00

## 2023-03-23 RX ADMIN — PROTAMINE SULFATE 30 MG: 10 INJECTION, SOLUTION INTRAVENOUS at 11:19

## 2023-03-23 RX ADMIN — LIDOCAINE HYDROCHLORIDE 100 MG: 20 INJECTION, SOLUTION INFILTRATION; PERINEURAL at 08:00

## 2023-03-23 RX ADMIN — FENTANYL CITRATE 100 MCG: 50 INJECTION, SOLUTION INTRAMUSCULAR; INTRAVENOUS at 09:10

## 2023-03-23 RX ADMIN — FENTANYL CITRATE 100 MCG: 50 INJECTION, SOLUTION INTRAMUSCULAR; INTRAVENOUS at 08:55

## 2023-03-23 RX ADMIN — POTASSIUM CHLORIDE 20 MEQ: 29.8 INJECTION, SOLUTION INTRAVENOUS at 15:51

## 2023-03-23 RX ADMIN — Medication 40 MG: at 15:51

## 2023-03-23 RX ADMIN — POTASSIUM CHLORIDE 20 MEQ: 29.8 INJECTION, SOLUTION INTRAVENOUS at 14:43

## 2023-03-23 RX ADMIN — HYDRALAZINE HYDROCHLORIDE 100 MG: 100 TABLET, FILM COATED ORAL at 05:41

## 2023-03-23 RX ADMIN — Medication 2 UNITS: at 11:29

## 2023-03-23 RX ADMIN — NOREPINEPHRINE BITARTRATE 12.8 MCG: 1 INJECTION, SOLUTION, CONCENTRATE INTRAVENOUS at 11:07

## 2023-03-23 RX ADMIN — PROPOFOL 40 MG/KG/HR: 10 INJECTION, EMULSION INTRAVENOUS at 12:45

## 2023-03-23 ASSESSMENT — ACTIVITIES OF DAILY LIVING (ADL)
ADLS_ACUITY_SCORE: 32
ADLS_ACUITY_SCORE: 32
ADLS_ACUITY_SCORE: 30
ADLS_ACUITY_SCORE: 32
ADLS_ACUITY_SCORE: 32

## 2023-03-23 NOTE — ANESTHESIA POSTPROCEDURE EVALUATION
Patient: Akshat Fragoso    Procedure: Procedure(s):  MEDIAN STERNOTOMY, TRANSESOPHAGEAL ECHOCARDIOGRAM PER ANESTHESIA, CARDIOPULMONARY BYPASS PUMP, INSERTION, LEFT VENTRICULAR ASSIST DEVICE (HEARTMATE IIl), TRICUSPID VALVE REPAIR WITH EDWRDS 32 MM MC3 TRICUSPID ANNULOPLASTY RING       Anesthesia Type:  General    Note:  Disposition: ICU; Inpatient            ICU Sign Out: Anesthesiologist/ICU physician sign out WAS performed   Postop Pain Control:    PONV:    Neuro/Psych:             Sign Out: PLANNED postop sedation   Airway/Respiratory:             Sign Out: AIRWAY IN SITU/Resp. Support (Nitric for right heart inotropy)               Airway in situ/Resp. Support: ETT                 Reason: Planned Pre-op   CV/Hemodynamics:             Sign Out: Detailed CV status               Perfusion:  Inotropes   Other NRE: NONE   DID A NON-ROUTINE EVENT OCCUR? No    Event details/Postop Comments:  Plts x2, PCCx1 based on quantra. AICD must be reprogrammed (currently arrhythmia interventions off, VVI 60). Nitric and epi for right heart inotropy, would wean angiotensin 2 and norepi, then vaso, and leave epi and nitric for now.            Last vitals:  Vitals:    03/23/23 0700 03/23/23 1340 03/23/23 1345   BP: 118/72     Pulse: 104 107 108   Resp:  16 21   Temp: 37.2  C (99  F)  37.4  C (99.3  F)   SpO2: 96% 100% 100%       Electronically Signed By: Joey Lombardo MD  March 23, 2023  1:53 PM

## 2023-03-23 NOTE — ANESTHESIA PROCEDURE NOTES
Airway       Patient location during procedure: OR       Procedure Start/Stop Times: 3/23/2023 8:05 AM  Staff -        Anesthesiologist:  Joey Lombardo MD       Resident/Fellow: Antwon Mac MD       Performed By: resident  Consent for Airway        Urgency: elective  Indications and Patient Condition       Indications for airway management: chaparro-procedural       Induction type:intravenous       Mask difficulty assessment: 1 - vent by mask    Final Airway Details       Final airway type: endotracheal airway       Successful airway: ETT - single and Oral  Endotracheal Airway Details        ETT size (mm): 8.0       Cuffed: yes       Successful intubation technique: direct laryngoscopy       DL Blade Type: MAC 4       Grade View of Cords: 1       Adjucts: stylet       Position: Right       Measured from: lips       Bite block used: None    Post intubation assessment        Placement verified by: capnometry, equal breath sounds and chest rise        Number of attempts at approach: 1       Number of other approaches attempted: 0       Secured with: pink tape       Ease of procedure: easy       Dentition: Unchanged and Intact    Medication(s) Administered   Medication Administration Time: 3/23/2023 8:05 AM

## 2023-03-23 NOTE — PROGRESS NOTES
[facility]         Intra-Aortic Balloon Pump Insertion:       Insertion Date: 3/22/2023  Insertion Time: 1814  Insertion Locations: CCL    Insertion Details:  Physician: Andrez  Site: Right Femoral Artery  Catheter Size: 8 Fr.  Balloon Volume: 50 cc  Fiberoptic: YES  Sheath: YES  Position verified with: fluoroscopy    Initial Settings:  Mode: Auto, 1:1, 100%     Machine #: [id number]    Milagro Carrillo, RT  ECMO Specialist  3/22/2023 7:15 PM

## 2023-03-23 NOTE — BRIEF OP NOTE
[facility]    Brief Operative Note    Pre-operative diagnosis: Heart failure (H) [I50.9]  Post-operative diagnosis Same as pre-operative diagnosis    Procedure: Procedure(s):  MEDIAN STERNOTOMY, TRANSESOPHAGEAL ECHOCARDIOGRAM PER ANESTHESIA, CARDIOPULMONARY BYPASS PUMP, INSERTION, LEFT VENTRICULAR ASSIST DEVICE (HEARTMATE IIl), TRICUSPID VALVE REPAIR WITH EDWRDS 32 MM MC3 TRICUSPID ANNULOPLASTY RING  Surgeon: Surgeon(s) and Role:     * Elena Matias MD - Primary     * Dakota Waller MD - Assisting  Anesthesia: General   Estimated Blood Loss: 1000 ml  Drains: 2 med chest tube, 1 med jamal drain, 1 left pleural chest tube  Specimens:   ID Type Source Tests Collected by Time Destination   1 : Myocardial core  Tissue Other SURGICAL PATHOLOGY EXAM Elena Matias MD 3/23/2023 10:51 AM      Findings:   see op note.  Complications: None.  Implants:   Implant Name Type Inv. Item Serial No.  Lot No. LRB No. Used Action   IMP KIT SUTURE COR-KNOT MINI 4X14MM [id number] - [id number] Metallic Hardware/[place] IMP KIT SUTURE COR-KNOT MINI 4X14MM [id number]  [employer] [id number] N/A 1 Implanted   DONAHUE MC3 TRICUSPID ANNULOPLASTY RING Annuloplasty Ring  [id number] [employer]  N/A 1 Implanted   DEVICE ELLIS ENDO COR KNOT QUICK LOAD RELOAD [id number] - [id number] Wire DEVICE ELLIS ENDO COR KNOT QUICK LOAD RELOAD [id number]  [employer]UT[employer] [id number] N/A 2 Implanted   THORATEC HEARTMATE 3 SEALED OUTFLOW GRAFT WITH BEND RELIEF LVAD   THORATEC TOSA ([employer])  [id number] N/A 1 Implanted   THORATEC HEARTMATE 3 VENTRICULAR ASSIST DEVICE-BLOOD PUMP LVAD  [id number] THORATEC LABS JUAN FRANCISCO   N/A 1 Implanted   DEVICE ELLIS ENDO COR KNOT QUICK LOAD [id number] - [id number] Wire DEVICE ELLIS ENDO COR KNOT QUICK LOAD [id number]  LSI SOLUTIONS [id number] N/A 2 Implanted   DEVICE ELLIS ENDO COR KNOT QUICK LOAD [id number] - [id number] Wire DEVICE ELLIS ENDO COR KNOT QUICK LOAD [id number]  LSI SOLUTIONS [id number] N/A 1 Implanted    THORATEC HEARTMATE 3 APICAL CUFF LVAD   [facility]  [id number] N/A 1 Implanted

## 2023-03-23 NOTE — CONSULTS
Cardiology Progress Note         Physical Exam   Temp: 99.3  F (37.4  C) Temp src: Bladder BP: 118/72 Pulse: 108   Resp: 21 SpO2: 100 % O2 Device: Mechanical Ventilator Oxygen Delivery: 50 LPM    Vital Signs with Ranges  Temp:  [98.3  F (36.8  C)-99.3  F (37.4  C)] 99.3  F (37.4  C)  Pulse:  [] 108  Resp:  [16-21] 21  BP: ()/(55-91) 118/72  MAP:  [88 mmHg] 88 mmHg  Arterial Line BP: (143)/(55) 143/55  FiO2 (%):  [50 %] 50 %  SpO2:  [92 %-100 %] 100 %      DATE MAP CVP PAP PCWP Carlos CO Carlos CI PVR SVR MVo2 Therapies   3/23/23 74 9 30/17 4 3.8 2   62 Epinephrine 0.04        LVAD Settings  Heartmate 3 LEFT VS  Flow (Lpm): 3.2 Lpm  Pulse Index (PI): (!) 12.4 PI  Speed (rpm): 5200 rpm  Power (mari): 3.7 mari  Current Hct settin    Intake/Output    Intake/Output Summary (Last 24 hours) at 3/23/2023 1511  Last data filed at 3/23/2023 1500  Gross per 24 hour   Intake 5039.93 ml   Output 2480 ml   Net 2559.93 ml       Weight  Vitals:    23 0530 23 0600 23 0400 23 0544   Weight: 72.6 kg (160 lb 0.9 oz) 72.2 kg (159 lb 2.8 oz) 75.3 kg (166 lb 0.1 oz) 73.4 kg (161 lb 13.1 oz)    23 0000   Weight: 80.5 kg (177 lb 7.5 oz)       Vent Mode: CMV/AC  (Continuous Mandatory Ventilation/ Assist Control)  FiO2 (%): 50 %  Resp Rate (Set): 16 breaths/min  Tidal Volume (Set, mL): 450 mL  PEEP (cm H2O): 5 cmH2O  Resp: 21        angiotensin II (GIAPREZA) ADULT infusion 2.5mg/250 mL NS Stopped (23 1355)    Followed by     angiotensin II (GIAPREZA) ADULT infusion 2.5mg/250 mL NS       dexmedetomidine       EPINEPHrine 0.12 mcg/kg/min (23 1403)     fentaNYL 50 mcg/hr (23 1441)     insulin regular 1 Units/hr (23 1405)     propofol 40 mcg/kg/min (23 1404)    And     - MEDICATION INSTRUCTIONS -       norepinephrine 0.06 mcg/kg/min (23 1402)     BETA BLOCKER NOT PRESCRIBED       sodium chloride       vasopressin 2 Units/hr (23 1400)         acetaminophen  975  "mg Oral Q8H HOWIE     [START ON 3/24/2023] aspirin  81 mg Oral or NG Tube Daily     [START ON 3/24/2023] fluconazole  200 mg Intravenous Q24H     gabapentin  100 mg Oral or Feeding Tube TID     [START ON 3/24/2023] heparin ANTICOAGULANT  5,000 Units Subcutaneous Q8H     [START ON 3/24/2023] levofloxacin  500 mg Intravenous Q24H     pantoprazole  40 mg Oral or NG Tube Daily    Or     pantoprazole  40 mg Oral Daily     [START ON 3/24/2023] polyethylene glycol  17 g Oral Daily     potassium chloride  20 mEq Intravenous Q1H     [START ON 3/24/2023] rifampin  600 mg Intravenous Q24H     senna-docusate  1 tablet Oral BID     sodium chloride (PF)  3 mL Intracatheter Q8H     vancomycin  1,500 mg Intravenous Q12H        , Blood pressure 118/72, pulse 108, temperature 99.3  F (37.4  C), temperature source Bladder, resp. rate 21, height 1.702 m (5' 7\"), weight 80.5 kg (177 lb 7.5 oz), SpO2 100 %.  Constitutional: NAD sedated  Cardiovascular: LVAD Hum  GI: Soft NT ND  Neuro: Unable to assess, patient sedated  Peripheral: No edema      Data   Recent Labs   Lab Test 03/23/23  1448 03/23/23  1341 03/23/23  1340 03/23/23  1254 03/23/23  0817 03/23/23  0347   NA  --   --  135*  135* 133   < > 132*   POTASSIUM  --   --  3.3*  3.3* 3.3*   < > 3.6  3.6   CHLORIDE  --   --  97*  97*  --   --  89*   CO2  --   --  25  25  --   --  32*   ANIONGAP  --   --  13  13  --   --  11   * 122* 111*  111* 106*   < > 98   BUN  --   --  15.8  15.8  --   --  19.7   CR  --   --  0.89  0.89  --   --  1.04   TONY  --   --  7.8*  7.8*  --   --  8.7    < > = values in this interval not displayed.       Lab Results   Component Value Date    WBC 21.2 03/23/2023    WBC 9.8 11/20/2019     Lab Results   Component Value Date    RBC 3.74 03/23/2023    RBC 4.70 11/20/2019     Lab Results   Component Value Date    HGB 12.3 03/23/2023    HGB 11.4 03/23/2023    HGB 14.3 11/20/2019     Lab Results   Component Value Date    HCT 35.6 03/23/2023    HCT 45.3 " 11/20/2019     No components found for: MCT  Lab Results   Component Value Date    MCV 95 03/23/2023    MCV 96 11/20/2019     Lab Results   Component Value Date    MCH 30.5 03/23/2023    MCH 30.4 11/20/2019     Lab Results   Component Value Date    MCHC 32.0 03/23/2023    MCHC 31.6 11/20/2019     Lab Results   Component Value Date    RDW 14.6 03/23/2023    RDW 13.0 11/20/2019     Lab Results   Component Value Date     03/23/2023     11/20/2019        Liver Function Studies -   Recent Labs   Lab Test 03/23/23  1340   PROTTOTAL 4.6*   ALBUMIN 3.0*   BILITOTAL 2.6*   ALKPHOS 65   AST 53*   ALT 16       Venous Blood Gas  Recent Labs   Lab 03/23/23  1344 03/23/23  1343 03/23/23  1254 03/23/23  1128 03/23/23  0958 03/23/23  0946 03/23/23  0817 03/23/23  0347 03/23/23  0008   PHV 7.31*  --   --   --   --  7.42  --  7.46* 7.46*   PCO2V 67*  --   --   --   --  56*  --  55* 54*   PO2V 41  --   --   --   --  50*  --  42 37   HCO3V 34*  --   --   --   --  37*  --  39* 38*   SAUNDRA 5.2*  --   --   --   --  10.5*  --  12.8* 11.7*   O2PER 50 50 100.0 100.0   < > 80.0   < > 2 2    < > = values in this interval not displayed.       Arterial Blood Gas  Recent Labs   Lab 03/23/23  1344 03/23/23  1343 03/23/23  1254 03/23/23  1128 03/23/23  1057   PH  --  7.35 7.37 7.42 7.48*   PCO2  --  56* 54* 46* 43   PO2  --  214* 473* 323* 381*   HCO3  --  31* 31* 30* 33*   O2PER 50 50 100.0 100.0 80.0          Recent Results (from the past 24 hour(s))   Cardiac Catheterization    Narrative      Right sided filling pressures are severely elevated.    Severely elevated pulmonary artery hypertension.    Left sided filling pressures are severely elevated.    Normal cardiac output level.     1. Successful placement of IABP at 1:1 in RFA.  2. Severely elevated biventricular pressures with normal cardiac output on   milrinone 0.25 mcg/kg/min.     XR Chest Port 1 View    Narrative    EXAM: XR CHEST PORT 1 VIEW  3/23/2023 12:57 AM     HISTORY:   daily swan and IABP check       COMPARISON:  3/21/2023    FINDINGS:   The right IJ [place]-Jalen catheter has been advanced without tip  projecting over the distal right main pulmonary artery. Intra-aortic  balloon pump marker projects at the level of the etienne. Stable left  chest wall cardiac device. The left costophrenic angle is partially  collimated out of the field of view. The cardiac silhouette is  unchanged. Increased diffuse interstitial opacities. No pneumothorax.  No pleural effusion.      Impression    IMPRESSION:   1. Right IJ [place] catheter has been advanced with the tip projecting  over the distal right main pulmonary artery. Intra-aortic balloon pump  marker projects at the level of the etienne. Additional support devices  are stable.  2. Cardiomegaly with increased diffuse interstitial opacities, likely  representing pulmonary edema.    I have personally reviewed the examination and initial interpretation  and I agree with the findings.    FANNY MELARA MD         SYSTEM ID:  [id number]   Cardiac Device Check - Inpatient   Result Value    Date Time Interrogation Session [id number]    Implantable Pulse Generator  [employer]    Implantable Pulse Generator Model D150 DYNAGEN    Implantable Pulse Generator Serial Number [id number]    Type Interrogation Session In Clinic    Clinic Name [facility]    Implantable Pulse Generator Type Defibrillator    Implantable Pulse Generator Implant Date 20170608    Implantable Lead  [employer]    Implantable Lead Model 0293 [place] 4-Site SG    Implantable Lead Serial Number [id number]    Implantable Lead Implant Date 20170608    Implantable Lead Polarity Type Bipolar Lead    Implantable Lead Location Detail 1 UNKNOWN    Implantable Lead Location Right Ventricle    Jesus Setting Mode (NBG Code) VVI    Jesus Setting Lower Rate Limit 40    Lead Channel Setting Sensing Polarity Bipolar    Lead Channel  Setting Sensing Sensitivity 0.6    Lead Channel Setting Sensing Adaptation Mode Adaptive    Lead Channel Setting Pacing Polarity Bipolar    Lead Channel Setting Pacing Pulse Width 0.4    Lead Channel Setting Pacing Amplitude 2.0    Zone Setting Type Category VF    Zone Setting Vendor Type Category VF    Zone Setting Type Category VT    Zone Setting Vendor Type Category VT    Lead Channel Impedance Value 414    Lead Channel Pacing Threshold Amplitude 1.0    Lead Channel Pacing Threshold Pulse Width 0.4    Battery Date Time of Measurements 20230323075100    Battery Status Beginning of Service    Battery Remaining Longevity 120    Battery Remaining Percentage 100    Capacitor Charge Type Reformation    Capacitor Last Charge Date Time 20221112043400    Capacitor Charge Time 10.4    Jesus Statistic Date Time Start 20210128000000    Jesus Statistic Date Time End 20230323000000    Jesus Statistic RV Percent Paced 0    Therapy Statistic Recent Shocks Delivered 0    Therapy Statistic Recent Shocks Aborted 0    Therapy Statistic Recent ATP Delivered 1    Therapy Statistic Recent Date Time Start 20210128000000    Therapy Statistic Recent Date Time End 20230323000000    Therapy Statistic Total Shocks Delivered 0    Therapy Statistic Total Shocks Aborted 0    Therapy Statistic Total ATP Delivered 1    Therapy Statistic Total  Date Time Start 20170608000000    Therapy Statistic Total  Date Time End 20230323000000    Episode Statistic Recent Count 13    Episode Statistic Type Category Other    Episode Statistic Recent Count 4    Episode Statistic Type Category VT    Episode Statistic Vendor Type Category NSVT    Episode Statistic Recent Count 0    Episode Statistic Type Category VF    Episode Statistic Vendor Type Category VF    Episode Statistic Recent Count 1    Episode Statistic Type Category VT    Episode Statistic Vendor Type Category VT    Episode Statistic Recent Count 0    Episode Statistic Type Category VT    Episode  Statistic Vendor Type Category VT-1    Episode Statistic Recent Date Time Start 20210128000000    Episode Statistic Recent Date Time End 20230323000000    Episode Statistic Recent Date Time Start 20210128000000    Episode Statistic Recent Date Time End 20230323000000    Episode Statistic Recent Date Time Start 20210128000000    Episode Statistic Recent Date Time End 20230323000000    Episode Statistic Recent Date Time Start 20210128000000    Episode Statistic Recent Date Time End 20230323000000    Episode Statistic Recent Date Time Start 20210128000000    Episode Statistic Recent Date Time End 20230323000000    Narrative    [facility] seen [facility] OR [facility] for evaluation and iterative programming of their ICD per MD orders. PRE-OP LVAD PROGRAMMING.     Device: iGen6 D150 DYNAGEN  Normal device function.   Mode: VVI 40 bpm  : <1%  Intrinsic Rhythm:  bpm  Lead Trends Appear Stable.   Estimated battery longevity to RRT = 10 years.  Ventricular Arrhythmia: over the last 2 days 1 VT episode at 183 bpm was successfully converted with 1 ATP sequence. 1 NSVT lasting 4 sec at 181 bpm.  Setting Changes: ICD Tachy Therapies turned OFF per MD orders.    Plan: Page ICD RN for post-op programming  ABRAHAM Washington, RN    Single lead ICD    I have reviewed and interpreted the device interrogation, settings, programming and nurse's summary. The device is functioning within normal device parameters. I agree with the current findings, assessment and plan.   XR Abdomen Port 1 View    Narrative    EXAM: XR ABDOMEN PORT 1 VIEW  3/23/2023 2:53 PM      HISTORY: OG placement    COMPARISON: Same-day chest x-ray, CAP 3/17/2023    FINDINGS: Single AP view of the abdomen. Midline sternotomy wires.  Mediastinal drains. LVAD. Partially visualized left chest tube.  Enteric sidehole projects over the gastroesophageal junction.  Partially visualized [place]-Jalen catheter.    Nonobstructive bowel gas pattern. No pneumatosis. Please see same  day  chest x-ray for further thoracic findings. No acute osseous  abnormality.      Impression    IMPRESSION: Enteric tube sidehole projects over the gastroesophageal  junction. Consider advancing 5 cm.    I have personally reviewed the examination and initial interpretation  and I agree with the findings.    HARDEEP FERNANDEZ MD         SYSTEM ID:  [id number]       Blood culture:  Results for orders placed or performed during the hospital encounter of 03/15/23   Blood Culture Hand, Right    Specimen: Hand, Right; Blood   Result Value Ref Range    Culture No growth after 3 days    Blood Culture Line, venous    Specimen: Line, venous; Blood   Result Value Ref Range    Culture No growth after 3 days       Urine culture:  No results found for this or any previous visit.    Assessment & Plan     57 year old man with past medical history of HFrEF (EF 10% 5/2022) 2/2 NICM s/p ICD, tobacco use disorder, marijuana use, HTN, lumbar radiculopathy, CKD who presents after being found down at home. Found to have had a prolonged episode of VT with concern for cardiac arrest s/p ROSC in the field now s/p LVAD with HM3 on 03/23/23.     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 Presented with out of hospital cardiac arrest with VT for 5 min 43 seconds of VT at 199 bpm, with spontaneous conversion to sinus rhythm without defibrillation on device interrogation on 3/ 15/2023. He had  A run of  VT while here, with hypotension on 3/16/18. Did not tolerate amiodarone gtt, due to hypotension.  Has been in sinus since then. Has been managed here with diuresis and afterload reducing agents with  hemodynamic monitoring. Patient started workup at [place] for LVAD as DT (initially evaluated for transplant though patient having trouble quitting marijuana/tobacco use). Now has started work up for LVAD here and has OR date planned for 3/23.     Recent studies:  - Coronary angiogram 2/2017 minimal nonobstructive coronary disease  - Echocardiogram 3/16/23: LVIDd 7.7, EF 5-10%   - VT secondary prevention while inpatient: held amiodarone due to intolerance with hypotension, has ICD  - Inotropes: Wean epi, levo, and vaso as tolerated  - Volume status: euvolemic  - BB: Hold PTA carvedilol 12.5 mg BID  - ACEi/ARB/ARNI: hold PTA lisinopril 5 mg BID  - afterload reduction: hold hydralazine 100mg q6h  - MRA: hold PTA spironolactone 12.5 mg BID  - SGLT2i: none PTA  - SCD ppx/BiV pacer: ICD, reprogrammed device to provide ATP burst at VT threshold  - discontinued PTA aspirin as there is no clear indication  - Fluid restrict, daily I/O, daily weights, lytes checks     Marco Santoyo MD  Cardiology Fellow  [phone]

## 2023-03-23 NOTE — ANESTHESIA PROCEDURE NOTES
Perioperative JOAN Procedure Note    Staff -        Anesthesiologist:  Joey Lombardo MD       Resident/Fellow: Antwon Mac MD       Performed By: anesthesiologist and resident  Preanesthesia Checklist:  Patient identified, IV assessed, risks and benefits discussed, monitors and equipment assessed, procedure being performed at surgeon's request and anesthesia consent obtained.    JOAN Probe Insertion    Probe Status PRE Insertion: NO obvious damage  Probe type:  Adult 3D  Bite block used:   None           Reason: Edentulous  Insertion Technique: Easy, no oropharyngeal manipulation  Insertion complications: None obvious  Billing Report:JOAN report by Anesthesiologist (See Separate Report note)  Probe Status POST Removal: NO obvious damage    JOAN Report  General Procedure Information  Images for this study have been archived.  Modalities: 2D, 3D, CW Doppler and PW Doppler  Diagnostic indications for JOAN:   Cardiomegaly  Non-ischemic cardiomyopathy  Cardiogenic shock  Diagnostic Indications comments: CHF LVAD as destination therapy.  Echocardiographic and Doppler Measurements  Right Ventricle:  Cavity size dilated.       Global function normal.     Left Ventricle:  Cavity size dilated.      Global Function severely impaired.   Ejection Fraction 10%.      Ventricular Regional Function:  1- Basal Anteroseptal:  hypokinetic  2- Basal Anterior:  hypokinetic  3- Basal Anterolateral:  hypokinetic  4- Basal Inferolateral:  hypokinetic  5- Basal Inferior:  hypokinetic  6- Basal Inferoseptal:  hypokinetic  7- Mid Anteroseptal:  hypokinetic  8- Mid Anterior:  hypokinetic  9- Mid Anterolateral:  hypokinetic  10- Mid Inferolateral:  hypokinetic  11- Mid Inferior:  hypokinetic  12- Mid Inferoseptal:  hypokinetic  13- Apical Anterior:  hypokinetic  14- Apical Lateral:  hypokinetic  15- Apical Inferior:  hypokinetic  16- Apical Septal:  hypokinetic  17- [place]:  hypokinetic    Valves  Aortic Valve: Annulus normal.  Stenosis not present.   Regurgitation absent.  Leaflets normal.  Leaflet motions normal.    Mitral Valve: Regurgitation +2.  Leaflets normal.  Leaflet motions restricted.    Tricuspid Valve: Regurgitation +3.    Pulmonic Valve: Annulus normal.  Stenosis not present.  Regurgitation +1.      Aorta: Ascending Aorta: Size normal.  Dissection not present.  Plaque thickness less than 3 mm.  Mobile plaque not present.    Aortic Arch: Size normal.    Dissection not present.   Plaque thickness less than 3 mm.   Mobile plaque not present.    Descending Aorta: Size normal.    Dissection not present.   Plaque thickness less than 3 mm.   Mobile plaque not present.      Right Atrium:  Size dilated.    Thrombus not present.    Device present.   Left Atrium: Size dilated.  Thrombus not present.  Left atrial appendage normal.     Atrial Septum: Intra-atrial septal morphology contains patent foramen ovale.       Ventricular Septum: Intra-ventricular septum morphology normal.        Other Findings:   Pericardium:  normal.    No coronary sinus catheter present. Coronary sinus size (mm):  12.   Post Intervention Findings  Procedure(s) performed:  LVAD Placement.  Regional wall motion:. Surgeon(s) notified of all postintervention findings: Yes.             Post Intervention comments: Post-bypass JOAN. No AI. The aortic valve opens 1:3 cardiac cycles. No residual PFO. Atrial septum bulges into the left atrium. TR ring, mild to moderate TR. RV function mildly reduced on epi/norepi/vaso nitric. Inflow cannula is well positioned with low velocities. There is no visible aortic dissection..    Echocardiogram Comments  Echocardiogram comments: Pre-bypass JOAN: LV dilated, EF ~10%. RV is mildly dilated and RV function is probably normal on milrinone with moderate to severe TR (there is reversal of hepatic venous flow). There is a swan in place which may be inhibiting coaptation. Trace AI. Mild MR. No MS. There is a continuous left to right shunt across a PFO..

## 2023-03-23 NOTE — ANESTHESIA PROCEDURE NOTES
Arterial Line Procedure Note    Pre-Procedure   Staff -        Anesthesiologist:  Joey Lombardo MD       Resident/Fellow: Antwon Mac MD       Performed By: resident       Pre-Anesthestic Checklist: patient identified, IV checked, risks and benefits discussed, informed consent, monitors and equipment checked, pre-op evaluation and at physician/surgeon's request  Timeout:       Correct Patient: Yes        Correct Procedure: Yes        Correct Site: Yes        Correct Position: Yes   Line Placement:   This line was placed Pre Induction starting at 3/23/2023 7:48 AM and ending at 3/23/2023 7:55 AM  Procedure   Procedure: arterial line       Laterality: left       Insertion Site: radial.  Sterile Prep        Standard elements of sterile barrier followed       Skin prep: Chloraprep  Insertion/Injection        Technique: ultrasound guided and Seldinger Technique        1. Ultrasound was used to evaluate the access site.       2. Artery evaluated via ultrasound for patency/adequacy.       3. Using real-time ultrasound the needle/catheter was observed entering the artery/vein.       Catheter Type/Size: 20 G, 12 cm  Narrative         Secured by: suture       Tegaderm dressing used.       Complications: None apparent,        Arterial waveform: Yes        IBP within 10% of NIBP: Yes

## 2023-03-23 NOTE — PROGRESS NOTES
[facility]         Intra-Aortic Balloon Pump Discontinuation:     IABP support discontinued 3/23/2023 at 1803.    Jason Brown, RT  ECMO Specialist  3/23/2023 6:51 PM

## 2023-03-23 NOTE — PROGRESS NOTES
VAD coordinator assessed pt abdomen for potential VAD driveline exit site.   Discussed with pt activities that may impact where driveline exits and side that they wear the controller.   He indicated no preference for which side the DL exits on.  Assessed pt laying down and observed abdomen for any folds.   Assessed where pt waist band rests. He stated he wears his pants below the umbilicus.  Used landmarks of 3 finger widths below rib cage at nipple line and moved medially approximately 3 finger lengths to accommodate modular cable anchoring laterally.   Marked potential exit sites on right and left. Actual exit site to be determined by surgeon.   Site marking communicated to surgeons Dr Matias and Dr Waller.

## 2023-03-23 NOTE — CONSULTS
Patient is already an established patient in the CORE/Heart Failure clinic.  He is receiving an LVAD and follow up will be coordinated by the LVAD team.     He has instructed on the importance of daily weights, 2 gm sodium diet, 2L fluid restriction and compliance with medications upon hospital discharge. He has also been instructed  to call with any questions or concerns, including any weight gain or loss of 2 or more pounds in 24 hours or 5 or more pounds in 1 week.Thank you for the consult.       Mando Kay RN  Cardiology Care Coordinator - [facility]   Questions and [phone]

## 2023-03-23 NOTE — PROGRESS NOTES
Patient in OR for HM3  implant. Pump prepped by Marine Gonzalez RN, VAD Coordinator and started by Marine Gonzalez RN, VAD Coordinator. VAD speed titrated up in collaboration with surgeon, anesthesia, and perfusion. Report was given to 4E RN upon arrival to the unit.       Parameters when leaving OR:  o Speed: 5200 rpm  o Flow: 3.4 lpm  o Power: 3.7 mari  o PI: 12.3    Flow range at set speed: 3.4-3.6 lpm                                               PI range at set speed: 10.1-13.0    Factors noted to cause a significant variation in VAD parameters: IABP present, volume status    Other significant events: NA     Driveline exits on the right side of the abdomen  Driveline is oriented/anchored toward the right  Suture is knotted on the inferior side of driveline    Please page the VAD Coordinator on-call with any VAD related questions (* * * [phone], job code [phone] from an internal line).     Marine Gonzalez RN

## 2023-03-23 NOTE — PROGRESS NOTES
Patient transported to and from OR for LVAD placement.  No adverse reactions.  IABP remains in place per Christin GODOY

## 2023-03-23 NOTE — CONSULTS
Health Psychology          Kirstin Tavarez, Ph.D., LP [phone]  Perri Kee, Ph.D., LP [phone]  Earline Meza, Ph.D., LP [phone]  Milagro Mercado, Ph.D., , ABPP [phone]  Sid Youssef, Ph.D., , ABPP [phone]  Nova BRIAN Fragoso, Ph.D., LP [phone]  Gisell Summers, Ph.D., , ABPP [phone]    [facility], 3rd Floor  [address] [place] [address]       Inpatient Health Psychology Consultation     Date of Service: 03/23/23     BACKGROUND: Per EMR: Mr. Akshat Fragoso  is a 57-year-old man with acute on chronic systolic heart failure (EF 10%) secondary to NICM, NYHA IIIb, who presented to [facility] following VT arrest with short down-time. Currently neurologically intact and LVAD work-up without any contraindications to proceeding.    Health psychology consulted at the recommendation of palliative care for assistance with adjustment to change in his medical status and desire for support for adult children, particularly patient's son, who performed CPR on patient prior to hospitalization.     Patient currently in the OR for LVAD placement. Will attempt to complete consultation at a later time.     Gisell Summers, Ph.D., , ABP  Clinical Health Psychologist  Phone: [phone]   Pager: [phone]  3/23/2023 1:33 PM

## 2023-03-23 NOTE — PLAN OF CARE
ICU End of Shift Summary. See flowsheets for vital signs and detailed assessment.    Changes this shift: Pt is alert and oriented, PERRLA. CHIANG, follows commands. PRN Oxycodone 5 mg Q4H for rib pain. Pt has been in ST w/ LBBB. Frequent PVCs and short runs of VT. MAP goal >65. Pt is on milrinone gtt. Pt is on NC 2 LPM. No BM this shift, bhaskar is patent. Pt has been NPO since midnight in anticipation for LVAD today. Potassium and Magnesium have been replaced twice this shift.    Plan: Monitor hemodynamic status. Plan for LVAD today @ 3911.  .

## 2023-03-24 ENCOUNTER — APPOINTMENT (OUTPATIENT)
Dept: GENERAL RADIOLOGY | Facility: CLINIC | Age: 58
DRG: 001 | End: 2023-03-24
Attending: STUDENT IN AN ORGANIZED HEALTH CARE EDUCATION/TRAINING PROGRAM
Payer: COMMERCIAL

## 2023-03-24 DIAGNOSIS — Z79.899 LONG TERM USE OF DRUG: ICD-10-CM

## 2023-03-24 DIAGNOSIS — Z95.811 LEFT VENTRICULAR ASSIST DEVICE PRESENT (H): ICD-10-CM

## 2023-03-24 DIAGNOSIS — I50.22 CHRONIC SYSTOLIC HEART FAILURE (H): ICD-10-CM

## 2023-03-24 DIAGNOSIS — I50.22 CHRONIC SYSTOLIC CONGESTIVE HEART FAILURE (H): ICD-10-CM

## 2023-03-24 LAB
ALBUMIN SERPL BCG-MCNC: 2.9 G/DL (ref 3.5–5.2)
ALBUMIN SERPL BCG-MCNC: 2.9 G/DL (ref 3.5–5.2)
ALBUMIN SERPL BCG-MCNC: 3.1 G/DL (ref 3.5–5.2)
ALBUMIN SERPL BCG-MCNC: 3.1 G/DL (ref 3.5–5.2)
ALLEN'S TEST: ABNORMAL
ALP SERPL-CCNC: 69 U/L (ref 40–129)
ALP SERPL-CCNC: 69 U/L (ref 40–129)
ALP SERPL-CCNC: 70 U/L (ref 40–129)
ALP SERPL-CCNC: 80 U/L (ref 40–129)
ALT SERPL W P-5'-P-CCNC: 24 U/L (ref 10–50)
ALT SERPL W P-5'-P-CCNC: 25 U/L (ref 10–50)
ALT SERPL W P-5'-P-CCNC: 27 U/L (ref 10–50)
ALT SERPL W P-5'-P-CCNC: 36 U/L (ref 10–50)
ANION GAP SERPL CALCULATED.3IONS-SCNC: 12 MMOL/L (ref 7–15)
ANION GAP SERPL CALCULATED.3IONS-SCNC: 12 MMOL/L (ref 7–15)
ANION GAP SERPL CALCULATED.3IONS-SCNC: 13 MMOL/L (ref 7–15)
ANION GAP SERPL CALCULATED.3IONS-SCNC: 15 MMOL/L (ref 7–15)
ANION GAP SERPL CALCULATED.3IONS-SCNC: 17 MMOL/L (ref 7–15)
APTT PPP: 39 SECONDS (ref 22–38)
AST SERPL W P-5'-P-CCNC: 114 U/L (ref 10–50)
AST SERPL W P-5'-P-CCNC: 159 U/L (ref 10–50)
AST SERPL W P-5'-P-CCNC: 197 U/L (ref 10–50)
AST SERPL W P-5'-P-CCNC: 237 U/L (ref 10–50)
ATRIAL RATE - MUSE: 104 BPM
ATRIAL RATE - MUSE: NORMAL BPM
BACTERIA BLD CULT: NO GROWTH
BASE EXCESS BLDA CALC-SCNC: 2.2 MMOL/L (ref -9–1.8)
BASE EXCESS BLDA CALC-SCNC: 2.9 MMOL/L (ref -9–1.8)
BASE EXCESS BLDA CALC-SCNC: 2.9 MMOL/L (ref -9–1.8)
BASE EXCESS BLDA CALC-SCNC: 3.1 MMOL/L (ref -9–1.8)
BASE EXCESS BLDV CALC-SCNC: -0.2 MMOL/L (ref -7.7–1.9)
BASE EXCESS BLDV CALC-SCNC: 0.7 MMOL/L (ref -7.7–1.9)
BASE EXCESS BLDV CALC-SCNC: 3.8 MMOL/L (ref -7.7–1.9)
BASE EXCESS BLDV CALC-SCNC: 4.1 MMOL/L (ref -7.7–1.9)
BASE EXCESS BLDV CALC-SCNC: 4.2 MMOL/L (ref -7.7–1.9)
BILIRUB SERPL-MCNC: 3.3 MG/DL
BILIRUB SERPL-MCNC: 3.3 MG/DL
BILIRUB SERPL-MCNC: 3.4 MG/DL
BILIRUB SERPL-MCNC: 4.5 MG/DL
BUN SERPL-MCNC: 20.7 MG/DL (ref 6–20)
BUN SERPL-MCNC: 22.1 MG/DL (ref 6–20)
BUN SERPL-MCNC: 24.5 MG/DL (ref 6–20)
BUN SERPL-MCNC: 24.5 MG/DL (ref 6–20)
BUN SERPL-MCNC: 27.2 MG/DL (ref 6–20)
CA-I BLD-MCNC: 4.4 MG/DL (ref 4.4–5.2)
CA-I BLD-MCNC: 4.5 MG/DL (ref 4.4–5.2)
CALCIUM SERPL-MCNC: 8.4 MG/DL (ref 8.6–10)
CALCIUM SERPL-MCNC: 8.4 MG/DL (ref 8.6–10)
CALCIUM SERPL-MCNC: 8.7 MG/DL (ref 8.6–10)
CALCIUM SERPL-MCNC: 8.7 MG/DL (ref 8.6–10)
CALCIUM SERPL-MCNC: 9 MG/DL (ref 8.6–10)
CHLORIDE SERPL-SCNC: 92 MMOL/L (ref 98–107)
CHLORIDE SERPL-SCNC: 96 MMOL/L (ref 98–107)
CHLORIDE SERPL-SCNC: 97 MMOL/L (ref 98–107)
CHLORIDE SERPL-SCNC: 99 MMOL/L (ref 98–107)
CHLORIDE SERPL-SCNC: 99 MMOL/L (ref 98–107)
CREAT SERPL-MCNC: 1.09 MG/DL (ref 0.67–1.17)
CREAT SERPL-MCNC: 1.14 MG/DL (ref 0.67–1.17)
CREAT SERPL-MCNC: 1.19 MG/DL (ref 0.67–1.17)
CREAT SERPL-MCNC: 1.2 MG/DL (ref 0.67–1.17)
CREAT SERPL-MCNC: 1.2 MG/DL (ref 0.67–1.17)
DEPRECATED HCO3 PLAS-SCNC: 22 MMOL/L (ref 22–29)
DEPRECATED HCO3 PLAS-SCNC: 23 MMOL/L (ref 22–29)
DEPRECATED HCO3 PLAS-SCNC: 23 MMOL/L (ref 22–29)
DEPRECATED HCO3 PLAS-SCNC: 26 MMOL/L (ref 22–29)
DEPRECATED HCO3 PLAS-SCNC: 26 MMOL/L (ref 22–29)
DIASTOLIC BLOOD PRESSURE - MUSE: NORMAL MMHG
DIASTOLIC BLOOD PRESSURE - MUSE: NORMAL MMHG
ERYTHROCYTE [DISTWIDTH] IN BLOOD BY AUTOMATED COUNT: 15 % (ref 10–15)
ERYTHROCYTE [DISTWIDTH] IN BLOOD BY AUTOMATED COUNT: 15.1 % (ref 10–15)
ERYTHROCYTE [DISTWIDTH] IN BLOOD BY AUTOMATED COUNT: 15.1 % (ref 10–15)
FIBRINOGEN PPP-MCNC: 469 MG/DL (ref 170–490)
GFR SERPL CREATININE-BSD FRML MDRD: 71 ML/MIN/1.73M2
GFR SERPL CREATININE-BSD FRML MDRD: 71 ML/MIN/1.73M2
GFR SERPL CREATININE-BSD FRML MDRD: 72 ML/MIN/1.73M2
GFR SERPL CREATININE-BSD FRML MDRD: 76 ML/MIN/1.73M2
GFR SERPL CREATININE-BSD FRML MDRD: 80 ML/MIN/1.73M2
GLUCOSE BLDC GLUCOMTR-MCNC: 108 MG/DL (ref 70–99)
GLUCOSE BLDC GLUCOMTR-MCNC: 123 MG/DL (ref 70–99)
GLUCOSE BLDC GLUCOMTR-MCNC: 126 MG/DL (ref 70–99)
GLUCOSE BLDC GLUCOMTR-MCNC: 127 MG/DL (ref 70–99)
GLUCOSE BLDC GLUCOMTR-MCNC: 142 MG/DL (ref 70–99)
GLUCOSE BLDC GLUCOMTR-MCNC: 198 MG/DL (ref 70–99)
GLUCOSE SERPL-MCNC: 123 MG/DL (ref 70–99)
GLUCOSE SERPL-MCNC: 136 MG/DL (ref 70–99)
GLUCOSE SERPL-MCNC: 136 MG/DL (ref 70–99)
GLUCOSE SERPL-MCNC: 141 MG/DL (ref 70–99)
GLUCOSE SERPL-MCNC: 211 MG/DL (ref 70–99)
HCO3 BLD-SCNC: 27 MMOL/L (ref 21–28)
HCO3 BLDV-SCNC: 24 MMOL/L (ref 21–28)
HCO3 BLDV-SCNC: 28 MMOL/L (ref 21–28)
HCO3 BLDV-SCNC: 30 MMOL/L (ref 21–28)
HCT VFR BLD AUTO: 38.2 % (ref 40–53)
HCT VFR BLD AUTO: 39 % (ref 40–53)
HCT VFR BLD AUTO: 39.7 % (ref 40–53)
HGB BLD-MCNC: 12.3 G/DL (ref 13.3–17.7)
HGB BLD-MCNC: 12.3 G/DL (ref 13.3–17.7)
HGB BLD-MCNC: 12.7 G/DL (ref 13.3–17.7)
HOLD SPECIMEN: NORMAL
INR PPP: 1.39 (ref 0.85–1.15)
INR PPP: 1.41 (ref 0.85–1.15)
INTERPRETATION ECG - MUSE: NORMAL
INTERPRETATION ECG - MUSE: NORMAL
LACTATE SERPL-SCNC: 2.1 MMOL/L (ref 0.7–2)
LACTATE SERPL-SCNC: 2.2 MMOL/L (ref 0.7–2)
LACTATE SERPL-SCNC: 2.3 MMOL/L (ref 0.7–2)
LACTATE SERPL-SCNC: 2.6 MMOL/L (ref 0.7–2)
LACTATE SERPL-SCNC: 2.7 MMOL/L (ref 0.7–2)
LACTATE SERPL-SCNC: 2.7 MMOL/L (ref 0.7–2)
MAGNESIUM SERPL-MCNC: 2 MG/DL (ref 1.7–2.3)
MAGNESIUM SERPL-MCNC: 2.5 MG/DL (ref 1.7–2.3)
MAGNESIUM SERPL-MCNC: 2.6 MG/DL (ref 1.7–2.3)
MCH RBC QN AUTO: 29.5 PG (ref 26.5–33)
MCH RBC QN AUTO: 30.1 PG (ref 26.5–33)
MCH RBC QN AUTO: 30.6 PG (ref 26.5–33)
MCHC RBC AUTO-ENTMCNC: 31.5 G/DL (ref 31.5–36.5)
MCHC RBC AUTO-ENTMCNC: 32 G/DL (ref 31.5–36.5)
MCHC RBC AUTO-ENTMCNC: 32.2 G/DL (ref 31.5–36.5)
MCV RBC AUTO: 93 FL (ref 78–100)
MCV RBC AUTO: 94 FL (ref 78–100)
MCV RBC AUTO: 96 FL (ref 78–100)
O2/TOTAL GAS SETTING VFR VENT: 2 %
O2/TOTAL GAS SETTING VFR VENT: 30 %
O2/TOTAL GAS SETTING VFR VENT: 35 %
OXYHGB MFR BLD: 95 % (ref 92–100)
OXYHGB MFR BLD: 96 % (ref 92–100)
OXYHGB MFR BLD: 96 % (ref 92–100)
OXYHGB MFR BLD: 98 % (ref 92–100)
OXYHGB MFR BLDV: 60 % (ref 70–75)
OXYHGB MFR BLDV: 62 % (ref 70–75)
OXYHGB MFR BLDV: 62 % (ref 70–75)
OXYHGB MFR BLDV: 65 % (ref 70–75)
OXYHGB MFR BLDV: 66 % (ref 70–75)
P AXIS - MUSE: NORMAL DEGREES
P AXIS - MUSE: NORMAL DEGREES
PCO2 BLD: 38 MM HG (ref 35–45)
PCO2 BLD: 39 MM HG (ref 35–45)
PCO2 BLD: 40 MM HG (ref 35–45)
PCO2 BLD: 42 MM HG (ref 35–45)
PCO2 BLDV: 38 MM HG (ref 40–50)
PCO2 BLDV: 46 MM HG (ref 40–50)
PCO2 BLDV: 49 MM HG (ref 40–50)
PCO2 BLDV: 50 MM HG (ref 40–50)
PCO2 BLDV: 56 MM HG (ref 40–50)
PH BLD: 7.42 [PH] (ref 7.35–7.45)
PH BLD: 7.44 [PH] (ref 7.35–7.45)
PH BLD: 7.45 [PH] (ref 7.35–7.45)
PH BLD: 7.46 [PH] (ref 7.35–7.45)
PH BLDV: 7.31 [PH] (ref 7.32–7.43)
PH BLDV: 7.39 [PH] (ref 7.32–7.43)
PH BLDV: 7.39 [PH] (ref 7.32–7.43)
PH BLDV: 7.41 [PH] (ref 7.32–7.43)
PH BLDV: 7.42 [PH] (ref 7.32–7.43)
PHOSPHATE SERPL-MCNC: 4.4 MG/DL (ref 2.5–4.5)
PHOSPHATE SERPL-MCNC: 5 MG/DL (ref 2.5–4.5)
PHOSPHATE SERPL-MCNC: 5.1 MG/DL (ref 2.5–4.5)
PLATELET # BLD AUTO: 166 10E3/UL (ref 150–450)
PLATELET # BLD AUTO: 166 10E3/UL (ref 150–450)
PLATELET # BLD AUTO: 184 10E3/UL (ref 150–450)
PO2 BLD: 137 MM HG (ref 80–105)
PO2 BLD: 85 MM HG (ref 80–105)
PO2 BLD: 89 MM HG (ref 80–105)
PO2 BLD: 95 MM HG (ref 80–105)
PO2 BLDV: 33 MM HG (ref 25–47)
PO2 BLDV: 36 MM HG (ref 25–47)
PO2 BLDV: 36 MM HG (ref 25–47)
PO2 BLDV: 39 MM HG (ref 25–47)
PO2 BLDV: 39 MM HG (ref 25–47)
POTASSIUM SERPL-SCNC: 4.5 MMOL/L (ref 3.4–5.3)
POTASSIUM SERPL-SCNC: 4.6 MMOL/L (ref 3.4–5.3)
POTASSIUM SERPL-SCNC: 4.7 MMOL/L (ref 3.4–5.3)
POTASSIUM SERPL-SCNC: 5.2 MMOL/L (ref 3.4–5.3)
PR INTERVAL - MUSE: 112 MS
PR INTERVAL - MUSE: NORMAL MS
PROT SERPL-MCNC: 4.7 G/DL (ref 6.4–8.3)
PROT SERPL-MCNC: 4.9 G/DL (ref 6.4–8.3)
PROT SERPL-MCNC: 5 G/DL (ref 6.4–8.3)
PROT SERPL-MCNC: 5.3 G/DL (ref 6.4–8.3)
QRS DURATION - MUSE: 120 MS
QRS DURATION - MUSE: 162 MS
QT - MUSE: 386 MS
QT - MUSE: 520 MS
QTC - MUSE: 507 MS
QTC - MUSE: 667 MS
R AXIS - MUSE: -71 DEGREES
R AXIS - MUSE: 237 DEGREES
RBC # BLD AUTO: 4.09 10E6/UL (ref 4.4–5.9)
RBC # BLD AUTO: 4.15 10E6/UL (ref 4.4–5.9)
RBC # BLD AUTO: 4.17 10E6/UL (ref 4.4–5.9)
SODIUM SERPL-SCNC: 128 MMOL/L (ref 136–145)
SODIUM SERPL-SCNC: 135 MMOL/L (ref 136–145)
SODIUM SERPL-SCNC: 135 MMOL/L (ref 136–145)
SODIUM SERPL-SCNC: 137 MMOL/L (ref 136–145)
SODIUM SERPL-SCNC: 137 MMOL/L (ref 136–145)
SYSTOLIC BLOOD PRESSURE - MUSE: NORMAL MMHG
SYSTOLIC BLOOD PRESSURE - MUSE: NORMAL MMHG
T AXIS - MUSE: 79 DEGREES
T AXIS - MUSE: 93 DEGREES
VENTRICULAR RATE- MUSE: 104 BPM
VENTRICULAR RATE- MUSE: 99 BPM
WBC # BLD AUTO: 17.4 10E3/UL (ref 4–11)
WBC # BLD AUTO: 22.3 10E3/UL (ref 4–11)
WBC # BLD AUTO: 23.3 10E3/UL (ref 4–11)

## 2023-03-24 PROCEDURE — 999N000015 HC STATISTIC ARTERIAL MONITORING DAILY

## 2023-03-24 PROCEDURE — 03HY32Z INSERTION OF MONITORING DEVICE INTO UPPER ARTERY, PERCUTANEOUS APPROACH: ICD-10-PCS | Performed by: ANESTHESIOLOGY

## 2023-03-24 PROCEDURE — 999N000065 XR CHEST PORT 1 VIEW

## 2023-03-24 PROCEDURE — 83605 ASSAY OF LACTIC ACID: CPT | Performed by: STUDENT IN AN ORGANIZED HEALTH CARE EDUCATION/TRAINING PROGRAM

## 2023-03-24 PROCEDURE — 99291 CRITICAL CARE FIRST HOUR: CPT | Mod: 24 | Performed by: ANESTHESIOLOGY

## 2023-03-24 PROCEDURE — 85384 FIBRINOGEN ACTIVITY: CPT | Performed by: STUDENT IN AN ORGANIZED HEALTH CARE EDUCATION/TRAINING PROGRAM

## 2023-03-24 PROCEDURE — 85027 COMPLETE CBC AUTOMATED: CPT | Performed by: STUDENT IN AN ORGANIZED HEALTH CARE EDUCATION/TRAINING PROGRAM

## 2023-03-24 PROCEDURE — 2894A ARTERIAL LINE PLACEMENT: CPT | Mod: GC | Performed by: ANESTHESIOLOGY

## 2023-03-24 PROCEDURE — 82330 ASSAY OF CALCIUM: CPT | Performed by: THORACIC SURGERY (CARDIOTHORACIC VASCULAR SURGERY)

## 2023-03-24 PROCEDURE — 36620 INSERTION CATHETER ARTERY: CPT | Mod: GC | Performed by: ANESTHESIOLOGY

## 2023-03-24 PROCEDURE — 71045 X-RAY EXAM CHEST 1 VIEW: CPT | Mod: 26 | Performed by: RADIOLOGY

## 2023-03-24 PROCEDURE — 272N000010 HC KIT CATH ARTERIAL EXT SUPPLY

## 2023-03-24 PROCEDURE — 84100 ASSAY OF PHOSPHORUS: CPT | Performed by: THORACIC SURGERY (CARDIOTHORACIC VASCULAR SURGERY)

## 2023-03-24 PROCEDURE — 999N000155 HC STATISTIC RAPCV CVP MONITORING

## 2023-03-24 PROCEDURE — 71045 X-RAY EXAM CHEST 1 VIEW: CPT

## 2023-03-24 PROCEDURE — 200N000002 HC R&B ICU UMMC

## 2023-03-24 PROCEDURE — 85610 PROTHROMBIN TIME: CPT | Performed by: STUDENT IN AN ORGANIZED HEALTH CARE EDUCATION/TRAINING PROGRAM

## 2023-03-24 PROCEDURE — 82805 BLOOD GASES W/O2 SATURATION: CPT | Performed by: STUDENT IN AN ORGANIZED HEALTH CARE EDUCATION/TRAINING PROGRAM

## 2023-03-24 PROCEDURE — 999N000157 HC STATISTIC RCP TIME EA 10 MIN

## 2023-03-24 PROCEDURE — 93750 INTERROGATION VAD IN PERSON: CPT | Performed by: INTERNAL MEDICINE

## 2023-03-24 PROCEDURE — C9113 INJ PANTOPRAZOLE SODIUM, VIA: HCPCS | Performed by: INTERNAL MEDICINE

## 2023-03-24 PROCEDURE — 999N000128 HC STATISTIC PERIPHERAL IV START W/O US GUIDANCE

## 2023-03-24 PROCEDURE — 82330 ASSAY OF CALCIUM: CPT | Performed by: STUDENT IN AN ORGANIZED HEALTH CARE EDUCATION/TRAINING PROGRAM

## 2023-03-24 PROCEDURE — 999N000045 HC STATISTIC DAILY SWAN MONITORING

## 2023-03-24 PROCEDURE — 82805 BLOOD GASES W/O2 SATURATION: CPT | Performed by: INTERNAL MEDICINE

## 2023-03-24 PROCEDURE — 250N000011 HC RX IP 250 OP 636: Performed by: STUDENT IN AN ORGANIZED HEALTH CARE EDUCATION/TRAINING PROGRAM

## 2023-03-24 PROCEDURE — 80053 COMPREHEN METABOLIC PANEL: CPT | Performed by: STUDENT IN AN ORGANIZED HEALTH CARE EDUCATION/TRAINING PROGRAM

## 2023-03-24 PROCEDURE — 85730 THROMBOPLASTIN TIME PARTIAL: CPT | Performed by: STUDENT IN AN ORGANIZED HEALTH CARE EDUCATION/TRAINING PROGRAM

## 2023-03-24 PROCEDURE — 83735 ASSAY OF MAGNESIUM: CPT | Performed by: THORACIC SURGERY (CARDIOTHORACIC VASCULAR SURGERY)

## 2023-03-24 PROCEDURE — 258N000003 HC RX IP 258 OP 636: Performed by: STUDENT IN AN ORGANIZED HEALTH CARE EDUCATION/TRAINING PROGRAM

## 2023-03-24 PROCEDURE — 999N000127 HC STATISTIC PERIPHERAL IV START W US GUIDANCE

## 2023-03-24 PROCEDURE — 250N000013 HC RX MED GY IP 250 OP 250 PS 637: Performed by: STUDENT IN AN ORGANIZED HEALTH CARE EDUCATION/TRAINING PROGRAM

## 2023-03-24 PROCEDURE — 84450 TRANSFERASE (AST) (SGOT): CPT | Performed by: STUDENT IN AN ORGANIZED HEALTH CARE EDUCATION/TRAINING PROGRAM

## 2023-03-24 PROCEDURE — 93010 ELECTROCARDIOGRAM REPORT: CPT | Mod: 59 | Performed by: INTERNAL MEDICINE

## 2023-03-24 PROCEDURE — 84155 ASSAY OF PROTEIN SERUM: CPT | Performed by: STUDENT IN AN ORGANIZED HEALTH CARE EDUCATION/TRAINING PROGRAM

## 2023-03-24 PROCEDURE — 99291 CRITICAL CARE FIRST HOUR: CPT | Mod: 25 | Performed by: INTERNAL MEDICINE

## 2023-03-24 PROCEDURE — 94003 VENT MGMT INPAT SUBQ DAY: CPT

## 2023-03-24 PROCEDURE — 93005 ELECTROCARDIOGRAM TRACING: CPT

## 2023-03-24 PROCEDURE — 84100 ASSAY OF PHOSPHORUS: CPT | Performed by: STUDENT IN AN ORGANIZED HEALTH CARE EDUCATION/TRAINING PROGRAM

## 2023-03-24 PROCEDURE — 250N000011 HC RX IP 250 OP 636: Performed by: INTERNAL MEDICINE

## 2023-03-24 PROCEDURE — 83735 ASSAY OF MAGNESIUM: CPT | Performed by: STUDENT IN AN ORGANIZED HEALTH CARE EDUCATION/TRAINING PROGRAM

## 2023-03-24 PROCEDURE — 94799 UNLISTED PULMONARY SVC/PX: CPT

## 2023-03-24 RX ORDER — HYDRALAZINE HYDROCHLORIDE 50 MG/1
50 TABLET, FILM COATED ORAL ONCE
Status: DISCONTINUED | OUTPATIENT
Start: 2023-03-24 | End: 2023-03-24

## 2023-03-24 RX ORDER — HYDRALAZINE HYDROCHLORIDE 25 MG/1
25 TABLET, FILM COATED ORAL ONCE
Status: DISCONTINUED | OUTPATIENT
Start: 2023-03-24 | End: 2023-03-24

## 2023-03-24 RX ORDER — ISOSORBIDE DINITRATE 20 MG/1
20 TABLET ORAL ONCE
Status: DISCONTINUED | OUTPATIENT
Start: 2023-03-24 | End: 2023-03-24

## 2023-03-24 RX ORDER — PANTOPRAZOLE SODIUM 40 MG/1
40 TABLET, DELAYED RELEASE ORAL 2 TIMES DAILY
Status: DISCONTINUED | OUTPATIENT
Start: 2023-03-24 | End: 2023-03-24

## 2023-03-24 RX ORDER — ISOSORBIDE DINITRATE 10 MG/1
10 TABLET ORAL ONCE
Status: DISCONTINUED | OUTPATIENT
Start: 2023-03-24 | End: 2023-03-24

## 2023-03-24 RX ORDER — ISOSORBIDE DINITRATE 20 MG/1
60 TABLET ORAL ONCE
Status: DISCONTINUED | OUTPATIENT
Start: 2023-03-25 | End: 2023-03-24

## 2023-03-24 RX ORDER — HYDRALAZINE HYDROCHLORIDE 50 MG/1
100 TABLET, FILM COATED ORAL ONCE
Status: DISCONTINUED | OUTPATIENT
Start: 2023-03-25 | End: 2023-03-24

## 2023-03-24 RX ORDER — HEPARIN SODIUM 10000 [USP'U]/100ML
500 INJECTION, SOLUTION INTRAVENOUS CONTINUOUS
Status: DISCONTINUED | OUTPATIENT
Start: 2023-03-24 | End: 2023-03-25

## 2023-03-24 RX ORDER — HYDRALAZINE HYDROCHLORIDE 25 MG/1
25 TABLET, FILM COATED ORAL 4 TIMES DAILY
Status: DISCONTINUED | OUTPATIENT
Start: 2023-03-24 | End: 2023-03-26

## 2023-03-24 RX ORDER — HYDRALAZINE HYDROCHLORIDE 10 MG/1
10 TABLET, FILM COATED ORAL ONCE
Status: DISCONTINUED | OUTPATIENT
Start: 2023-03-24 | End: 2023-03-24

## 2023-03-24 RX ORDER — DOBUTAMINE HYDROCHLORIDE 200 MG/100ML
2.5 INJECTION INTRAVENOUS CONTINUOUS
Status: DISCONTINUED | OUTPATIENT
Start: 2023-03-24 | End: 2023-03-25

## 2023-03-24 RX ORDER — ISOSORBIDE DINITRATE 20 MG/1
40 TABLET ORAL ONCE
Status: DISCONTINUED | OUTPATIENT
Start: 2023-03-24 | End: 2023-03-24

## 2023-03-24 RX ORDER — MAGNESIUM SULFATE HEPTAHYDRATE 40 MG/ML
2 INJECTION, SOLUTION INTRAVENOUS ONCE
Status: COMPLETED | OUTPATIENT
Start: 2023-03-24 | End: 2023-03-24

## 2023-03-24 RX ADMIN — HYDRALAZINE HYDROCHLORIDE 25 MG: 25 TABLET, FILM COATED ORAL at 12:46

## 2023-03-24 RX ADMIN — ASPIRIN 81 MG 81 MG: 81 TABLET ORAL at 08:24

## 2023-03-24 RX ADMIN — PANTOPRAZOLE SODIUM 40 MG: 40 INJECTION, POWDER, FOR SOLUTION INTRAVENOUS at 19:59

## 2023-03-24 RX ADMIN — PROPOFOL 20 MCG/KG/MIN: 10 INJECTION, EMULSION INTRAVENOUS at 09:08

## 2023-03-24 RX ADMIN — POLYETHYLENE GLYCOL 3350 17 G: 17 POWDER, FOR SOLUTION ORAL at 08:24

## 2023-03-24 RX ADMIN — HYDROMORPHONE HYDROCHLORIDE 0.4 MG: 0.2 INJECTION, SOLUTION INTRAMUSCULAR; INTRAVENOUS; SUBCUTANEOUS at 17:01

## 2023-03-24 RX ADMIN — Medication 40 MG: at 08:26

## 2023-03-24 RX ADMIN — FLUCONAZOLE IN SODIUM CHLORIDE 200 MG: 2 INJECTION, SOLUTION INTRAVENOUS at 11:23

## 2023-03-24 RX ADMIN — HEPARIN SODIUM 500 UNITS/HR: 10000 INJECTION, SOLUTION INTRAVENOUS at 10:01

## 2023-03-24 RX ADMIN — DOBUTAMINE HYDROCHLORIDE 2.5 MCG/KG/MIN: 200 INJECTION INTRAVENOUS at 09:00

## 2023-03-24 RX ADMIN — METHOCARBAMOL 750 MG: 750 TABLET ORAL at 18:11

## 2023-03-24 RX ADMIN — SENNOSIDES AND DOCUSATE SODIUM 1 TABLET: 8.6; 5 TABLET ORAL at 19:59

## 2023-03-24 RX ADMIN — ACETAMINOPHEN 975 MG: 325 TABLET, FILM COATED ORAL at 21:23

## 2023-03-24 RX ADMIN — OXYCODONE HYDROCHLORIDE 10 MG: 10 TABLET ORAL at 19:59

## 2023-03-24 RX ADMIN — HYDRALAZINE HYDROCHLORIDE 25 MG: 25 TABLET, FILM COATED ORAL at 19:59

## 2023-03-24 RX ADMIN — HYDRALAZINE HYDROCHLORIDE 10 MG: 20 INJECTION INTRAMUSCULAR; INTRAVENOUS at 12:40

## 2023-03-24 RX ADMIN — HYDRALAZINE HYDROCHLORIDE 25 MG: 25 TABLET, FILM COATED ORAL at 15:55

## 2023-03-24 RX ADMIN — SODIUM CHLORIDE, POTASSIUM CHLORIDE, SODIUM LACTATE AND CALCIUM CHLORIDE 500 ML: 600; 310; 30; 20 INJECTION, SOLUTION INTRAVENOUS at 05:37

## 2023-03-24 RX ADMIN — ACETAMINOPHEN 975 MG: 325 TABLET, FILM COATED ORAL at 05:35

## 2023-03-24 RX ADMIN — ACETAMINOPHEN 975 MG: 325 TABLET, FILM COATED ORAL at 14:15

## 2023-03-24 RX ADMIN — LEVOFLOXACIN 500 MG: 5 INJECTION, SOLUTION INTRAVENOUS at 07:58

## 2023-03-24 RX ADMIN — PROPOFOL 20 MCG/KG/MIN: 10 INJECTION, EMULSION INTRAVENOUS at 04:47

## 2023-03-24 RX ADMIN — PANTOPRAZOLE SODIUM 40 MG: 40 INJECTION, POWDER, FOR SOLUTION INTRAVENOUS at 12:24

## 2023-03-24 RX ADMIN — OXYCODONE HYDROCHLORIDE 10 MG: 10 TABLET ORAL at 23:56

## 2023-03-24 RX ADMIN — OXYCODONE HYDROCHLORIDE 5 MG: 5 TABLET ORAL at 05:35

## 2023-03-24 RX ADMIN — OXYCODONE HYDROCHLORIDE 10 MG: 10 TABLET ORAL at 15:55

## 2023-03-24 RX ADMIN — GABAPENTIN 100 MG: 100 CAPSULE ORAL at 19:59

## 2023-03-24 RX ADMIN — GABAPENTIN 100 MG: 100 CAPSULE ORAL at 08:25

## 2023-03-24 RX ADMIN — RIFAMPIN 600 MG: 600 INJECTION, POWDER, LYOPHILIZED, FOR SOLUTION INTRAVENOUS at 09:50

## 2023-03-24 RX ADMIN — SODIUM CHLORIDE, POTASSIUM CHLORIDE, SODIUM LACTATE AND CALCIUM CHLORIDE 500 ML: 600; 310; 30; 20 INJECTION, SOLUTION INTRAVENOUS at 00:16

## 2023-03-24 RX ADMIN — MAGNESIUM SULFATE IN WATER 2 G: 40 INJECTION, SOLUTION INTRAVENOUS at 05:53

## 2023-03-24 RX ADMIN — VANCOMYCIN HYDROCHLORIDE 1500 MG: 10 INJECTION, POWDER, LYOPHILIZED, FOR SOLUTION INTRAVENOUS at 23:49

## 2023-03-24 RX ADMIN — VANCOMYCIN HYDROCHLORIDE 1500 MG: 10 INJECTION, POWDER, LYOPHILIZED, FOR SOLUTION INTRAVENOUS at 12:47

## 2023-03-24 RX ADMIN — SENNOSIDES AND DOCUSATE SODIUM 1 TABLET: 8.6; 5 TABLET ORAL at 08:24

## 2023-03-24 RX ADMIN — GABAPENTIN 100 MG: 100 CAPSULE ORAL at 14:15

## 2023-03-24 RX ADMIN — HYDROMORPHONE HYDROCHLORIDE 0.4 MG: 0.2 INJECTION, SOLUTION INTRAMUSCULAR; INTRAVENOUS; SUBCUTANEOUS at 18:51

## 2023-03-24 RX ADMIN — CALCIUM GLUCONATE 1 G: 20 INJECTION, SOLUTION INTRAVENOUS at 16:29

## 2023-03-24 ASSESSMENT — ACTIVITIES OF DAILY LIVING (ADL)
ADLS_ACUITY_SCORE: 28
ADLS_ACUITY_SCORE: 28
ADLS_ACUITY_SCORE: 32
ADLS_ACUITY_SCORE: 32
ADLS_ACUITY_SCORE: 28
ADLS_ACUITY_SCORE: 32
ADLS_ACUITY_SCORE: 32
ADLS_ACUITY_SCORE: 28
ADLS_ACUITY_SCORE: 32

## 2023-03-24 NOTE — PROGRESS NOTES
CV ICU PROGRESS NOTE  March 24, 2023   Akshat Fragoso  [id number]  Admitted: 3/15/2023  8:01 PM      ASSESSMENT: 57 year old male with PMH of HFrEF (10%) secondary to NICM (suspected viral etiology) s/p ICD, chronic tobacco/marijuana use, COPD, HTN, CKD stage III, who presented to [facility] on 3/15 after being found down from a VT arrest. CPR was performed by family. Spontaneously converted to NSR without defibrillation. Down time suspected to be ~ 6 minutes. Presents to [facility] for LVAD placement with preop IABP by Dr. Matias on 3/23/23. He arrives to the CVICU intubated and sedated on propofol gtt s/p LVAD (Heartmate III) placement, TVR, and PFO repair with Dr. Matias and Dr. Waller 3/23/22, remains intubated and requiring hemodynamic support     CO-MORBIDITIES:       Patient Active Problem List   Diagnosis     CHF (congestive heart failure) (H)     Cardiomyopathy, nonischemic (H)     Personal history of tobacco use, presenting hazards to health     ICD (implantable cardioverter-defibrillator) in place- [employer], single chamber- NOT dependent     Chronic systolic heart failure (H)     JOHNSON (dyspnea on exertion)     Acute on chronic systolic congestive heart failure (H)     Hypertrophic nonobstructive cardiomyopathy (H)     Other ill-defined heart diseases     Erectile dysfunction, unspecified erectile dysfunction type     COPD, severe (H)     Ventricular tachycardia      Overnight and today's changes:  Overnight IABP removed at 6pm with hematoma noted at FemPop applied. Nitric weaned to 10. Received 1.5L LR bolus overnight. Following commands and moving all extremities with low dose propofol weaned overnight. HM3 flows improved with IABP removal. OG to suction. Making 20/30cc UOP per Adkins.    PLAN:  Neuro/ pain/ sedation:  - Monitor neurological status. Notify the MD for any acute changes in exam.  #Acute postoperative pain  - Scheduled: Tylenol   - PRN: Hydromorphone, Oxycodone  #Sedation  - RASS goal 0 to -1   -  Wean Propofol GTT, Fentanyl GTT as tolerated     Pulmonary care:   #Mechanical ventilation  Vent Mode: CMV/AC  (Continuous Mandatory Ventilation/ Assist Control)  FiO2 (%): 30 %  Resp Rate (Set): 18 breaths/min  Tidal Volume (Set, mL): 480 mL  PEEP (cm H2O): 5 cmH2O  Resp: 18    - Wean NO2 off today, from 5 to 4 to 3 to 2 to 1 to 0 hourly from this AM  - PST, wean to extubate later this afternoon if possible  - Daily CXR   - Goal SpO2 > 92%  - Encourage IS q15-30 minutes when awake.  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 Left ventricular function is severely reduced. The ejection fraction is 5-10%. Global right ventricular function is normal. Moderate tricuspid insufficiency. Estimated pulmonary artery systolic pressure is 46 mmHg. Dilation of the inferior vena cava is present with abnormal respiratory variation in diameter.  - Continue to wean Nitric off, consider diuresis later today  - Epi, NE, Vaso gtts for inotropic and pressor support, wean as able  - Monitor hemodynamic status.   - Goal MAP >65, SBP <140  - SCD ppx/BiV pacer: ICD, reprogrammed device to provide ATP burst at VT threshold. Device consult in  - Hold PTA ASA, Spironolactone, Lisinopril, Carvedilol     GI care / Nutrition:   #Elevated LFT  #Hyperbilirubinemia with concern for congestive hepatopathy  Bilirubin, Total 3.3;   - NPO, bedside swallow eval once extubated  - Nutrition consulted, appreciate recommendations  - PPI BID for bloody OG tube output this AM  - Trend CMP q8H, monitor elevated LFT  - Hold off on tube feeds for now, will consider trickle TF tomorrow if unable to extubate  - Bowel regimen: MiraLAX, Senna     Renal / Fluids /  Electrolytes:   #CKDIII  Cr 1.2 from 1.1  - Currently at 20-30cc/hr UOP, CTM  - Strict I/O, daily weights  - Avoid/limit nephrotoxins as able  - Replete lytes PRN per protocol. Actively replacing potassium      Endocrine:    #Stress hyperglycemia  BG WNGR  - Insulin GTT, continue for 24 hours post-operatively  - Goal BG <180 for optimal healing     ID / Antibiotics:  #Stress induced leukocytosis  - No s/sx infection  - To complete perioperative regimen  - Continue to monitor fever curve, WBC, and inflammatory markers as appropriate     Heme:     #Acute blood loss anemia  #Acute blood loss thrombocytopenia  No s/sx active bleeding  - Continue to monitor  - CBC q8H  - Monitor coag panel, INR daily  - Hgb goal > 7.0  - Hemoglobin stable 12.8 post op, 12.3 this AM     MSK / Skin:  #Sternotomy  #Surgical Incision  - Sternal precautions  - Postoperative incision management per protocol  - PT/OT/CR     Prophylaxis:     - Mechanical ppx for DVT  - Chemical DVT ppx: Hold SQH tonight  - PPI  - Bowel regimen: PPI, MiraLAX, senna     Lines / Tubes / Drains:  - ETT  - RIJ CVC, PA catheter  - Arterial Line  - CTs x4  - Adkins  - OG     Disposition:  - CVICU    ICU Checklist  F - Feeding:   A - Analgesia:   S - Sedation:   T - Thromboembolic prophylaxis:      H - Head of bed elevated  U - Ulcer prophylaxis:  G - Glycemic control  S - SBT     B - Bowel regimen  I - Indwelling catheter  D - De-escalation of antibiotics    Patient seen, findings and plan discussed with CVICU staff and cardiothoracic surgeons.    Balaji Daley MD, PGY3  Surgery  3/24/2023 at 6:18 AM    ====================================    TODAY'S PROGRESS  SUBJECTIVE  Overnight IABP removed at 6pm with hematoma noted at FemPop applied. Nitric weaned to 10. Received 1.5L LR bolus overnight. Following commands and moving all extremities with low dose propofol weaned overnight. HM3 flows improved with IABP removal. OG to suction. Making 20/30cc UOP per  Jed.    OBJECTIVE  1. VITAL SIGNS  Temp:  [96.4  F (35.8  C)-99.9  F (37.7  C)] 99.9  F (37.7  C)  Pulse:  [] 109  Resp:  [16-24] 19  BP: (117-118)/(67-72) 118/72  MAP:  [58 mmHg-90 mmHg] 86 mmHg  Arterial Line BP: ()/(30-80) 94/77  FiO2 (%):  [30 %-50 %] 30 %  SpO2:  [88 %-100 %] 95 %  Vent Mode: CMV/AC  (Continuous Mandatory Ventilation/ Assist Control)  FiO2 (%): 30 %  Resp Rate (Set): 18 breaths/min  Tidal Volume (Set, mL): 480 mL  PEEP (cm H2O): 5 cmH2O  Resp: 19      2. INTAKE/ OUTPUT  I/O last 3 completed shifts:  In: 6093.33 [I.V.:3997.33; Other:875; NG/GT:80; IV Piggyback:600]  Out: 2820 [Urine:2205; Emesis/NG output:150; Chest Tube:465]    3. PHYSICAL EXAMINATION    General: Intubated, on sedation.  Neuro: on sedation.   Resp: intubated, ventilated. Course to clear lung sounds  CV: sinus rhythm  Abdomen: Soft, non-distended, non-tender  Incisions: c/d/i  Extremities: cool, perfused. Mildly edematous  CT: To suction, serosang output, no airleak      4. INVESTIGATIONS  Arterial Blood Gases   Recent Labs   Lab 03/24/23  0356 03/23/23  2354 03/23/23  1950 03/23/23  1751   PH 7.45 7.44 7.45 7.44   PCO2 39 40 42 44   PO2 89 137* 146* 153*   HCO3 27 27 29* 30*     Complete Blood Count   Recent Labs   Lab 03/24/23  0355 03/23/23  1949 03/23/23  1340 03/23/23  1254 03/23/23  1128 03/23/23  1123   WBC 22.3* 24.0* 31.9*  --   --  21.2*   HGB 12.3* 12.8* 12.6* 12.3*   < > 11.4*    186 219  --   --  102*    < > = values in this interval not displayed.     Basic Metabolic Panel  Recent Labs   Lab 03/24/23  0357 03/24/23  0355 03/24/23  0134 03/23/23  2338 03/23/23  2118 03/23/23  1949 03/23/23  1341 03/23/23  1340 03/23/23  1254 03/23/23  0817 03/23/23  0347   NA  --  135*  --   --   --  134*  --  135*  135* 133   < > 132*   POTASSIUM  --  5.2  --   --   --  4.5  4.5  --  3.3*  3.3* 3.3*   < > 3.6  3.6   CHLORIDE  --  97*  --   --   --  96*  --  97*  97*  --   --  89*   CO2  --  23  --   --    --  25  --  25  25  --   --  32*   BUN  --  22.1*  --   --   --  20.1*  --  15.8  15.8  --   --  19.7   CR  --  1.19*  --   --   --  1.08  --  0.89  0.89  --   --  1.04   * 141* 142* 144*   < > 125*   < > 111*  111* 106*   < > 98    < > = values in this interval not displayed.     Liver Function Tests  Recent Labs   Lab 03/24/23  0355 03/23/23  1340 03/23/23  1123 03/21/23  1813 03/20/23  1420 03/18/23  0431   * 53*  --  31  --  43   ALT 25 16  --  19  --  27   ALKPHOS 70 65  --  86  --  74   BILITOTAL 3.3* 2.6*  --  1.1  --  1.5*   ALBUMIN 2.9* 3.0*  --  3.9  --  3.7   INR 1.39* 1.42* 1.92*  --  1.30* 1.18*     Pancreatic Enzymes  No lab results found in last 7 days.  Coagulation Profile  Recent Labs   Lab 03/24/23  0355 03/23/23  1340 03/23/23  1123 03/20/23  1420 03/18/23  0431   INR 1.39* 1.42* 1.92* 1.30* 1.18*   PTT  --  34 31 34 28     Lactate  Invalid input(s): LACTATE    5. RADIOLOGY  Recent Results (from the past 24 hour(s))   Cardiac Device Check - Inpatient   Result Value    Date Time Interrogation Session [id number]    Implantable Pulse Generator  [employer]    Implantable Pulse Generator Model D150 DYNAGEN    Implantable Pulse Generator Serial Number [id number]    Type Interrogation Session In Clinic    Clinic Name [facility]    Implantable Pulse Generator Type Defibrillator    Implantable Pulse Generator Implant Date 20170608    Implantable Lead  [employer]    Implantable Lead Model 0293 Endotak [place] 4-Site SG    Implantable Lead Serial Number [id number]    Implantable Lead Implant Date 20170608    Implantable Lead Polarity Type Bipolar Lead    Implantable Lead Location Detail 1 UNKNOWN    Implantable Lead Location Right Ventricle    Jesus Setting Mode (NBG Code) VVI    Jesus Setting Lower Rate Limit 40    Lead Channel Setting Sensing Polarity Bipolar    Lead Channel Setting Sensing Sensitivity 0.6    Lead Channel Setting  Sensing Adaptation Mode Adaptive    Lead Channel Setting Pacing Polarity Bipolar    Lead Channel Setting Pacing Pulse Width 0.4    Lead Channel Setting Pacing Amplitude 2.0    Zone Setting Type Category VF    Zone Setting Vendor Type Category VF    Zone Setting Type Category VT    Zone Setting Vendor Type Category VT    Lead Channel Impedance Value 414    Lead Channel Pacing Threshold Amplitude 1.0    Lead Channel Pacing Threshold Pulse Width 0.4    Battery Date Time of Measurements 20230323075100    Battery Status Beginning of Service    Battery Remaining Longevity 120    Battery Remaining Percentage 100    Capacitor Charge Type Reformation    Capacitor Last Charge Date Time 20221112043400    Capacitor Charge Time 10.4    Jesus Statistic Date Time Start 20210128000000    Jesus Statistic Date Time End 20230323000000    Jesus Statistic RV Percent Paced 0    Therapy Statistic Recent Shocks Delivered 0    Therapy Statistic Recent Shocks Aborted 0    Therapy Statistic Recent ATP Delivered 1    Therapy Statistic Recent Date Time Start 20210128000000    Therapy Statistic Recent Date Time End 20230323000000    Therapy Statistic Total Shocks Delivered 0    Therapy Statistic Total Shocks Aborted 0    Therapy Statistic Total ATP Delivered 1    Therapy Statistic Total  Date Time Start 20170608000000    Therapy Statistic Total  Date Time End 20230323000000    Episode Statistic Recent Count 13    Episode Statistic Type Category Other    Episode Statistic Recent Count 4    Episode Statistic Type Category VT    Episode Statistic Vendor Type Category NSVT    Episode Statistic Recent Count 0    Episode Statistic Type Category VF    Episode Statistic Vendor Type Category VF    Episode Statistic Recent Count 1    Episode Statistic Type Category VT    Episode Statistic Vendor Type Category VT    Episode Statistic Recent Count 0    Episode Statistic Type Category VT    Episode Statistic Vendor Type Category VT-1    Episode Statistic  Recent Date Time Start 20210128000000    Episode Statistic Recent Date Time End 20230323000000    Episode Statistic Recent Date Time Start 20210128000000    Episode Statistic Recent Date Time End 20230323000000    Episode Statistic Recent Date Time Start 20210128000000    Episode Statistic Recent Date Time End 20230323000000    Episode Statistic Recent Date Time Start 20210128000000    Episode Statistic Recent Date Time End 20230323000000    Episode Statistic Recent Date Time Start 20210128000000    Episode Statistic Recent Date Time End 20230323000000    Narrative    Patient seen in [facility] OR 26 for evaluation and iterative programming of   their ICD per MD orders. PRE-OP LVAD PROGRAMMING.     Device: [employer] D150 DYNAGEN  Normal device function.   Mode: VVI 40 bpm  : <1%  Intrinsic Rhythm:  bpm  Lead Trends Appear Stable.   Estimated battery longevity to RRT = 10 years.  Ventricular Arrhythmia: over the last 2 days 1 VT episode at 183 bpm was   successfully converted with 1 ATP sequence. 1 NSVT lasting 4 sec at 181   bpm.  Setting Changes: ICD Tachy Therapies turned OFF per MD orders.    Plan: Page ICD RN for post-op programming  ABRAHAM Washington RN    Single lead ICD    I have reviewed and interpreted the device interrogation, settings,   programming and nurse's summary. The device is functioning within normal   device parameters. I agree with the current findings, assessment and plan.   XR Chest Port 1 View    Narrative    Exam: XR CHEST PORT 1 VIEW, 3/23/2023 2:52 PM    Comparison: Radiograph same day, 3/21/2023, 3/19/2023    History: Post Op CVTS Surgery    Findings:  Portable AP view of the chest. Endotracheal tube tip projects over the  low thoracic trachea. IABP marker projects 2.5 cm above the etienne.  New postsurgical changes of the chest. Median sternotomy wires and  mediastinal clips. Mediastinal drains. Right IJ [place]-Jalen catheter tip  projects in stable position over the distal right main  pulmonary  artery. Partially visualized LVAD. Left chest wall cardiac device  leads is not fully visualized. Unknown linear density projecting over  the mid to low medial right hemithorax may be external. The left  costophrenic angle is not included in the field-of-view.  Cardiomediastinal silhouette is within normal limits. Mild perihilar  and dense retrocardiac opacities. No appreciable right pleural  effusion. No appreciable pneumothorax. Small amount of subcutaneous  emphysema in the lower neck.      Impression    Impression:  1. Endotracheal tube tip projects over the lower thoracic trachea.  Additional support devices as noted above.  2. New postsurgical changes of the chest with partially visualized  LVAD.   3. Unknown linear density projects over the mid to lower medial right  hemithorax, may be a external to the patient  4. No appreciable pneumothorax.  5. Mild perihilar and dense retrocardiac opacities, likely  atelectasis.    I have personally reviewed the examination and initial interpretation  and I agree with the findings.    CAITLIN RAO MD         SYSTEM ID:  [id number]   XR Abdomen Port 1 View    Narrative    EXAM: XR ABDOMEN PORT 1 VIEW  3/23/2023 2:53 PM      HISTORY: OG placement    COMPARISON: Same-day chest x-ray, CAP 3/17/2023    FINDINGS: Single AP view of the abdomen. Midline sternotomy wires.  Mediastinal drains. LVAD. Partially visualized left chest tube.  Enteric sidehole projects over the gastroesophageal junction.  Partially visualized [place]-Jalen catheter.    Nonobstructive bowel gas pattern. No pneumatosis. Please see same day  chest x-ray for further thoracic findings. No acute osseous  abnormality.      Impression    IMPRESSION: Enteric tube sidehole projects over the gastroesophageal  junction. Consider advancing 5 cm.    I have personally reviewed the examination and initial interpretation  and I agree with the findings.    HARDEEP FERNANDEZ MD         SYSTEM ID:  [id number]    Cardiac Device Check - Inpatient   Result Value    Date Time Interrogation Session [id number]    Implantable Pulse Generator  [employer]    Implantable Pulse Generator Model D150 DYNAGEN    Implantable Pulse Generator Serial Number [id number]    Type Interrogation Session In Clinic    Clinic Name [facility]    Implantable Pulse Generator Type Defibrillator    Implantable Pulse Generator Implant Date 20170608    Implantable Lead  [employer]    Implantable Lead Model 0293 Endotak [place] 4-Site SG    Implantable Lead Serial Number [id number]    Implantable Lead Implant Date 20170608    Implantable Lead Polarity Type Bipolar Lead    Implantable Lead Location Detail 1 UNKNOWN    Implantable Lead Location Right Ventricle    Jesus Setting Mode (NBG Code) VVI    Jesus Setting Lower Rate Limit 40    Lead Channel Setting Sensing Polarity Bipolar    Lead Channel Setting Sensing Sensitivity 0.6    Lead Channel Setting Sensing Adaptation Mode Adaptive    Lead Channel Setting Pacing Polarity Bipolar    Lead Channel Setting Pacing Pulse Width 0.4    Lead Channel Setting Pacing Amplitude 2.0    Zone Setting Type Category VF    Zone Setting Vendor Type Category VF    Zone Setting Detection Interval 300    Zone Setting Type Category VT    Zone Setting Vendor Type Category VT    Zone Setting Detection Interval 353    Lead Channel Impedance Value 410    Lead Channel Pacing Threshold Amplitude 1.0    Lead Channel Pacing Threshold Pulse Width 0.4    Battery Date Time of Measurements [id number]    Battery Status Beginning of Service    Battery Remaining Longevity 120    Battery Remaining Percentage 100    Capacitor Charge Type Reformation    Capacitor Last Charge Date Time [id number]    Capacitor Charge Time 10.4    Jesus Statistic Date Time Start 20230323000000    Jesus Statistic Date Time End 20230323000000    Jesus Statistic RV Percent Paced 0    Therapy Statistic  Recent Shocks Delivered 0    Therapy Statistic Recent Shocks Aborted 0    Therapy Statistic Recent ATP Delivered 0    Therapy Statistic Recent Date Time Start 20230323000000    Therapy Statistic Recent Date Time End 20230323000000    Therapy Statistic Total Shocks Delivered 0    Therapy Statistic Total Shocks Aborted 0    Therapy Statistic Total ATP Delivered 1    Therapy Statistic Total  Date Time Start 20170608000000    Therapy Statistic Total  Date Time End 20230323000000    Episode Statistic Recent Count 0    Episode Statistic Type Category Other    Episode Statistic Recent Count 0    Episode Statistic Type Category VT    Episode Statistic Vendor Type Category NSVT    Episode Statistic Recent Count 0    Episode Statistic Type Category VF    Episode Statistic Vendor Type Category VF    Episode Statistic Recent Count 0    Episode Statistic Type Category VT    Episode Statistic Vendor Type Category VT    Episode Statistic Recent Count 0    Episode Statistic Type Category VT    Episode Statistic Vendor Type Category VT-1    Episode Statistic Recent Date Time Start 20230323000000    Episode Statistic Recent Date Time End 20230323000000    Episode Statistic Recent Date Time Start 67232680305108    Episode Statistic Recent Date Time End 78039987865913    Episode Statistic Recent Date Time Start 20230323000000    Episode Statistic Recent Date Time End 20230323000000    Episode Statistic Recent Date Time Start 20230323000000    Episode Statistic Recent Date Time End [id number]    Episode Statistic Recent Date Time Start 21909958680536    Episode Statistic Recent Date Time End 86915306199628    Narrative    Patient seen in the [facility] CVICU for evaluation and iterative programming of   their ICD per MD orders, post-op LVAD Implant.    Device: [employer] D150 DYNAGEN  Normal device function.   Mode: VVI 40 bpm  Intrinsic Rhythm:  bpm  Estimated battery longevity to RRT = 10 years.    Setting Changes: ICD  Tachy Therapies Turned ON.    ABRAHAM Washington RN  Single lead ICD    I have reviewed and interpreted the device interrogation, settings,   programming and nurse's summary. The device is functioning within normal   device parameters. I agree with the current findings, assessment and plan.

## 2023-03-24 NOTE — CONSULTS
Health Psychology                                                                                                                          Kirstin Tavarez, Ph.D., L.P [phone]  Perri Kee, Ph.D., L.P. [phone]  Earline Meza, Ph.D, L..P. [phone]  Milagro Mercado, Ph.D., L.P. [phone]  Sid Youssef, Ph.D., A.B.P.P., L.P. [phone]         Nova Fragoso, Ph.D., L.P. [phone]       Gisell Summers, Ph.D., A.B.P.P., LP [phone]           [facility], 3rd Floor  [address] [place] [address]       Inpatient Health Psychology Consultation      Date of Service:  03/24/23    Met with Akshat's family - Landry and Oneil - today to introduce Health Psychology services and discuss nature of referral. Camille Melvin, [facility], provided background information about Akshat and his family and requests for support.    Provided resources to Landry and Oneil including information on common responses to traumatic incidences and ways to connect with outpatient therapy. Provided handouts. Made plans to touch base next week as well.     PLAN: Meet with Akshat next week to offer emotional support as he is able to participate. Follow-up with family as well.    Earline Meza, PhD,   Clinical Health Psychologist  Phone: [phone]  Pager: [phone]

## 2023-03-24 NOTE — PLAN OF CARE
Goal Outcome Evaluation:      Plan of Care Reviewed With: other (see comments) (unable to discuss with pt at this time)    Overall Patient Progress: no changeOverall Patient Progress: no change    Outcome Evaluation: Will monitor for extubation and initiation of PO diet

## 2023-03-24 NOTE — PROGRESS NOTES
Admitted/transferred from: OR  Reason for admission/transfer: s/p LVAD HM 3 w/Dr Matias  Patient status upon admission/transfer: Intubated & sedated.  Interventions: Standard post op interventions.   Plan: Continue post op care.   2 RN skin assessment: Evert HICKS and Sherrie HICKS  Result of skin assessment and interventions/actions: no interventions required  Height, weight, drug calc weight: done  Patient belongings: placed in pt's closet.     Evert Fuentes RN  Hours cared for: [phone]

## 2023-03-24 NOTE — OP NOTE
DATE OF SERVICE: March 24, 2023    PRE-OPERATIVE DIAGNOSES:  1) Non-ischemic cardiomyopathy  2) Heart failure with reduced ejection fraction (EF 10%)  3) Hypertension  4) Chronic kidney disease  5) s/p recent VT/VF cardiac arrest  6) Chronic tobacco use     POST-OPERATIVE DIAGNOSES:  1) Non-ischemic cardiomyopathy  2) Heart failure with reduced ejection fraction (EF 10%)  3) Hypertension  4) Chronic kidney disease  5) s/p recent VT/VF cardiac arrest  6) Chronic tobacco use       PROCEDURE PERFORMED:  1) Implant of Heartmate III left ventricular assist device (intracorporeal left ventricular assist device)  2) Tricuspid valve annuloplasty with 32 mm MC3 partial ring  3) Patent foramen ovale closure    SURGEON: KRISSY Matias MD    CO-SURGEON: Dakota Waller MD; (a co-surgeon was necessary due to the complexity of the procedure and the lack of a qualified resident/fellow for the duration of the case). Dr. Waller specifically performed a portion of the inflow cannula and outflow graft anastomosis.    RESIDENT/FELLOW SURGEON: Clayton White MD    SECOND ASSISTANT: N/A    ANESTHESIA: General endotracheal anesthesia    ANESTHESIOLOGIST: Joey Lombardo MD    ESTIMATED BLOOD LOSS: 1000 cc    CARDIOPULMONARY BYPASS TIME: 96 minutes    INDICATIONS:  Mr. Akshat Fragoso is a 57 year old male admitted to [facility] following a VT/VF arrest. He underwent chest compressions during the event with a short down-time. ROSC was obtained within 6 minutes. We were asked to evaluate for placement of an LVAD. He completed an extensive pre-operative work-up and on multi-disciplinary discussion, was determined to be an appropriate candidate for placement of the Heartmate III LVAD.  Risks and benefits of the operation were explained to the patient and their family including, but not limited to, bleeding, infection, stroke and even death.  They understood these risks and agreed to proceed electively.    OPERATIVE FINDINGS:  Right ventricular function  was normal. There was severe tricuspid regurgitation, which was worse than what was noted pre-operatively, therefore a tricuspid valve annuloplasty was also performed. There was no aortic insufficiency. There was a large patent foramen ovale. The patient was weaned from cardiopulmonary bypass without difficulty on moderately high inotrope/pressor support, with good LVAD flows.    OPERATIVE REPORT:  After obtaining informed consent, the patient was brought to the operating room and placed in the supine position on the operating room table. Appropriate lines and devices for monitoring were placed by the anesthesia team. The patient underwent smooth induction of general anesthesia, and the trachea was intubated orally. A right internal jugular Cordis introducer was placed, and a Thorpe-Jalen catheter was placed through this. The patient was prepped and draped, and a timeout was performed to confirm the correct patient identity, as well as the procedure to be performed.     A median sternotomy was performed. The patient was given full dose heparin to achieve an activated clotting time over 480 seconds. Following this, the distal ascending aorta, and the superior and inferior vena cavae were bi-cavally cannulated. Cardiopulmonary bypass was initiated with good flows. We proceeded to snare both cavae and went on total cardiopulmonary bypass.    The right atrium was incised and the tricuspid valve/inter-atrial septum was exposed. The patent foramen ovale was closed in a running fashion with 4-0 Prolene suture. The annulus was sized to a 32mm Hassan MC3 Tricuspid Annuloplasty ring. Pledgeted annular sutures were next placed around the tricuspid valve, avoiding the conduction tissue at the base of the septal leaflet. The sutures were passed through the annuloplasty ring, which was secured to the annulus using Cor-Knots. The atriotomy was next closed in a double-layer running fashion with 4-0 Prolene.    The heart was elevated  and the left ventricular apex was exposed using lap sponges.  The sewing cuff was sutured to the left ventricular apex using pledgeted 2-0 tevdek sutures, which were secured with Cor-Knots.  An apical core was made using the coring knife.  Next the left ventricular assist device pump was placed.  The driveline was tunneled out the right upper quadrant.     Next, the outflow graft was cut to length.  A partially occluding aortic clamp was applied.  The outflow graft was anastomosed to the ascending aorta in end-to-side fashion using running 4-0 prolene.  The outflow graft was deaired.     The patient was weaned from cardiopulmonary bypass.  There was satisfactory RV function.  Heparin was reversed with protamine.  The patient was somewhat coagulopathic but hemostasis was eventually achieved after administration of appropriate clotting factors.    The sternal retractor was removed.  Two straight 32Fr argyle mediastinal chest tubes and a 28 Fr left pleural chest tube were placed.   A 24F pump pocket drain was also placed.  These were delivered through the anterior abdominal wall and secured to the skin with 0-ethibond stitches.     The wound was made hemostatic with cautery.  The subcutaneous tissue, sternal edges, thymic fat, pericardial edges and all anastomotic and cannulation sites were inspected and hemostatic.    The wound was irrigated with warm antibiotic saline.  The sternal edges were reapposed with 3 interrupted #6 stainless steel sternal wires in the manubrium, 3 double wires in the body of the sternum and 1 additional #6 stainless steel sternal wire at the lower aspect of the sternum. The subcutaneous tissues and skin were closed in layers of running absorbable suture.    The patient was then transferred from the operating bed to an ICU bed and transferred to the ICU in critical, but stable, condition.    All needle, sponge and instrument counts were correct at the end of the case.    KRISSY Matias  MD  Cardiothoracic Surgery  Pager [phone]

## 2023-03-24 NOTE — ANESTHESIA CARE TRANSFER NOTE
Patient: Akshat Fragoso    Procedure: Procedure(s):  MEDIAN STERNOTOMY, TRANSESOPHAGEAL ECHOCARDIOGRAM PER ANESTHESIA, CARDIOPULMONARY BYPASS PUMP, INSERTION, LEFT VENTRICULAR ASSIST DEVICE (HEARTMATE IIl), TRICUSPID VALVE REPAIR WITH EDWRDS 32 MM MC3 TRICUSPID ANNULOPLASTY RING       Diagnosis: Heart failure (H) [I50.9]  Diagnosis Additional Information: No value filed.    Anesthesia Type:   General     Note:    Oropharynx: endotracheal tube in place and ventilatory support  Level of Consciousness: unresponsive              Patient transferred to: ICU    ICU Handoff: Call for PAUSE to initiate/utilize ICU HANDOFF, Identified Patient, Identified Responsible Provider, Reviewed the Pertinent Medical History, Discussed Surgical Course, Reviewed Intra-OP Anesthesia Management and Issues during Anesthesia, Set Expectations for Post Procedure Period and Allowed Opportunity for Questions and Acknowledgement of Understanding      Vitals:  Vitals Value Taken Time   BP     Temp 37.9  C (100.22  F) 03/24/23 0852   Pulse 102 03/24/23 0852   Resp 18 03/24/23 0852   SpO2 97 % 03/24/23 0852   Vitals shown include unvalidated device data.    Electronically Signed By: Joey Lombardo MD  March 24, 2023  8:54 AM

## 2023-03-24 NOTE — PLAN OF CARE
Goal Outcome Evaluation:      Plan of Care Reviewed With: patient    Overall Patient Progress: no changeOverall Patient Progress: no change       Neuro/Pain/Sedation: Awakens to voice, follows commands and moves all extremities. Low dose propofol weaned overnight.  Cardiac: HM3 flows 3.5-4.5. PI 3.5-6.0. 5200RPM. No alarms overnight. 500mL LR boluses given x3 for low UOP with fair effects. MAPs at high end of goal range. 0.02 of epi not adjusted overnight per team. iNO2 at 10. Lactic acid with slight uptrend later in shift.  Respiratory: CMV/AC 30%, R14, P5, V480. FiO2 requirements downtrending.  GI/: OG to LIS. No bowel movement. Low urine output 20-30 for much of the night, team aware.  Activity: Flat bedrest s/p IABP removal and femostop placement.  Skin: Chest tube site dressing CDI. Driveline site dressing CDI. R femoral IABP site dressed after femostop removal, CDI.  LDA's: 4x CTs, very small air leak noted. L radial arterial line. RIJ CVC with swan @58cm. Adkins in place.     Plan: Optimize hemodynamics, wean vent and nitric and work toward extubation.    For vital signs and complete assessment data, please see documentation flowsheets.

## 2023-03-24 NOTE — PROGRESS NOTES
VAD Social Work Services Progress Note      Date of Initial Social Work Evaluation: 3/17/2023  Collaborated with: Jennifer RAMOS, patient, Wife, Son, and Earline from health psychology    Data: Akshat received LVAD implant on 3/23. He remains in ICU intubated and sedated, but following commands. Wife called yesterday to follow up on the request made for a connection to psychotherapy for her children. Writer was able to connect with health psychology yesterday and refer family for services. Wife and Son coming in today to meet with health psychology.  Intervention: Talked with wife via phone yesterday. Collaborated with health psychology and was able to connect wife and son with health psychology today. Met briefly with wife and son for in-person support prior to meeting with health psych. Will be out of office on the weekend and Monday. Patient and son aware.  Assessment: Per wife, children were doing okay, but looking forward to meeting with health psychology. Patient progressing post-surgery. May get expedited soon. Family meeting with psychology for mental health support. Will also benefit from ongoing SW psychosocial support.  Education provided by SW: Ongoing SW availability  Plan:    Discharge Plans in Progress: TBD    Barriers to d/c plan: Medical condition-VAD yesterday, still intubated    Follow up Plan: SW will continue to follow for ongoing psychosocial support post-VAD.

## 2023-03-24 NOTE — CONSULTS
Cardiology Progress Note       Changes Today:   -agree with weaning nitric and attempt to extubate  -Add hydralazine and isordil titration   -Agree with dobutamine  -Pawling guided volume optimization, currently euvolumic       Physical Exam   Temp: 100  F (37.8  C) Temp src: Bladder   Pulse: 111   Resp: 19 SpO2: 98 % O2 Device: Mechanical Ventilator Oxygen Delivery: 50 LPM    Vital Signs with Ranges  Temp:  [96.4  F (35.8  C)-100  F (37.8  C)] 100  F (37.8  C)  Pulse:  [] 111  Resp:  [16-24] 19  MAP:  [58 mmHg-90 mmHg] 85 mmHg  Arterial Line BP: ()/(30-80) 92/77  FiO2 (%):  [30 %-50 %] 30 %  SpO2:  [87 %-100 %] 98 %      LVAD Settings  Heartmate 3 LEFT VS  Flow (Lpm): 4 Lpm  Pulse Index (PI): 5.7 PI  Speed (rpm): 5200 rpm  Power (mari): 3.7 mari  Current Hct settin    Intake/Output Summary (Last 24 hours) at 3/24/2023 0748  Last data filed at 3/24/2023 0700  Gross per 24 hour   Intake 7215.06 ml   Output 1730 ml   Net 5485.06 ml       Weight  Vitals:    23 0600 23 0400 23 0544 23 0000   Weight: 72.2 kg (159 lb 2.8 oz) 75.3 kg (166 lb 0.1 oz) 73.4 kg (161 lb 13.1 oz) 80.5 kg (177 lb 7.5 oz)    23 0400   Weight: 81.1 kg (178 lb 12.7 oz)       Vent Mode: CMV/AC  (Continuous Mandatory Ventilation/ Assist Control)  FiO2 (%): 30 %  Resp Rate (Set): 18 breaths/min  Tidal Volume (Set, mL): 480 mL  PEEP (cm H2O): 5 cmH2O  Resp: 19        EPINEPHrine 0.02 mcg/kg/min (23 0700)     fentaNYL 50 mcg/hr (23 0700)     insulin regular Stopped (23 1800)     propofol 20 mcg/kg/min (23 0728)    And     - MEDICATION INSTRUCTIONS -       BETA BLOCKER NOT PRESCRIBED       sodium chloride       vasopressin Stopped (23 0246)         acetaminophen  975 mg Oral Q8H HOWIE     aspirin  81 mg Oral or NG Tube Daily     fluconazole  200 mg Intravenous Q24H     gabapentin  100 mg Oral or Feeding Tube TID     heparin ANTICOAGULANT  5,000 Units Subcutaneous Q8H      "levofloxacin  500 mg Intravenous Q24H     pantoprazole  40 mg Oral or NG Tube Daily    Or     pantoprazole  40 mg Oral Daily     polyethylene glycol  17 g Oral Daily     rifampin  600 mg Intravenous Q24H     senna-docusate  1 tablet Oral BID     sodium chloride (PF)  3 mL Intracatheter Q8H     vancomycin  1,500 mg Intravenous Q12H        , Blood pressure 118/72, pulse 111, temperature 100  F (37.8  C), resp. rate 19, height 1.702 m (5' 7\"), weight 81.1 kg (178 lb 12.7 oz), SpO2 98 %.  Constitutional: NAD sedated, responds to yes no questions appropriately  Cardiovascular: LVAD Hum  GI: Soft NT ND  Neuro: Unable to assess, patient sedated  Peripheral: No edema    Data   Recent Labs   Lab Test 03/24/23  0357 03/24/23  0355 03/23/23 2118 03/23/23 1949   NA  --  135*  --  134*   POTASSIUM  --  5.2  --  4.5  4.5   CHLORIDE  --  97*  --  96*   CO2  --  23  --  25   ANIONGAP  --  15  --  13   * 141*   < > 125*   BUN  --  22.1*  --  20.1*   CR  --  1.19*  --  1.08   TONY  --  8.7  --  8.9    < > = values in this interval not displayed.       Lab Results   Component Value Date    WBC 22.3 03/24/2023    WBC 9.8 11/20/2019     Lab Results   Component Value Date    RBC 4.17 03/24/2023    RBC 4.70 11/20/2019     Lab Results   Component Value Date    HGB 12.3 03/24/2023    HGB 14.3 11/20/2019     Lab Results   Component Value Date    HCT 39.0 03/24/2023    HCT 45.3 11/20/2019     No components found for: MCT  Lab Results   Component Value Date    MCV 94 03/24/2023    MCV 96 11/20/2019     Lab Results   Component Value Date    MCH 29.5 03/24/2023    MCH 30.4 11/20/2019     Lab Results   Component Value Date    MCHC 31.5 03/24/2023    MCHC 31.6 11/20/2019     Lab Results   Component Value Date    RDW 15.1 03/24/2023    RDW 13.0 11/20/2019     Lab Results   Component Value Date     03/24/2023     11/20/2019        Liver Function Studies -   Recent Labs   Lab Test 03/24/23  0355   PROTTOTAL 4.9*   ALBUMIN 2.9* "   BILITOTAL 3.3*   ALKPHOS 70   *   ALT 25       Venous Blood Gas  Recent Labs   Lab 03/24/23  0618 03/24/23  0356 03/24/23 0341 03/23/23 2354 03/23/23 2333 03/23/23  1950 03/23/23  1947 03/23/23  1751   PHV  --   --  7.42  --  7.41  --  7.42 7.40   PCO2V  --   --  46  --  46  --  50 53*   PO2V  --   --  33  --  34  --  35 35   HCO3V  --   --  30*  --  29*  --  32* 33*   SAUNDRA  --   --  4.2*  --  4.0*  --  6.1* 6.2*   O2PER 30 30 30 35 35   < > 40 40  40    < > = values in this interval not displayed.       Arterial Blood Gas  Recent Labs   Lab 03/24/23  0618 03/24/23  0356 03/24/23 0341 03/23/23 2354 03/23/23 2333 03/23/23  1950   PH 7.46* 7.45  --  7.44  --  7.45   PCO2 38 39  --  40  --  42   PO2 85 89  --  137*  --  146*   HCO3 27 27  --  27  --  29*   O2PER 30 30 30 35   < > 40    < > = values in this interval not displayed.          Recent Results (from the past 24 hour(s))   Cardiac Device Check - Inpatient   Result Value    Date Time Interrogation Session [id number]    Implantable Pulse Generator  [employer]    Implantable Pulse Generator Model D150 KB LabsAGEN    Implantable Pulse Generator Serial Number [id number]    Type Interrogation Session In Clinic    Clinic Name [facility]    Implantable Pulse Generator Type Defibrillator    Implantable Pulse Generator Implant Date 20170608    Implantable Lead  [employer]    Implantable Lead Model 0293 Endotak [place] 4-Site SG    Implantable Lead Serial Number [id number]    Implantable Lead Implant Date 20170608    Implantable Lead Polarity Type Bipolar Lead    Implantable Lead Location Detail 1 UNKNOWN    Implantable Lead Location Right Ventricle    Jesus Setting Mode (NBG Code) VVI    Jesus Setting Lower Rate Limit 40    Lead Channel Setting Sensing Polarity Bipolar    Lead Channel Setting Sensing Sensitivity 0.6    Lead Channel Setting Sensing Adaptation Mode Adaptive    Lead Channel Setting  Pacing Polarity Bipolar    Lead Channel Setting Pacing Pulse Width 0.4    Lead Channel Setting Pacing Amplitude 2.0    Zone Setting Type Category VF    Zone Setting Vendor Type Category VF    Zone Setting Type Category VT    Zone Setting Vendor Type Category VT    Lead Channel Impedance Value 414    Lead Channel Pacing Threshold Amplitude 1.0    Lead Channel Pacing Threshold Pulse Width 0.4    Battery Date Time of Measurements 20230323075100    Battery Status Beginning of Service    Battery Remaining Longevity 120    Battery Remaining Percentage 100    Capacitor Charge Type Reformation    Capacitor Last Charge Date Time 20221112043400    Capacitor Charge Time 10.4    Jesus Statistic Date Time Start 20210128000000    Jesus Statistic Date Time End 20230323000000    Jesus Statistic RV Percent Paced 0    Therapy Statistic Recent Shocks Delivered 0    Therapy Statistic Recent Shocks Aborted 0    Therapy Statistic Recent ATP Delivered 1    Therapy Statistic Recent Date Time Start 20210128000000    Therapy Statistic Recent Date Time End 20230323000000    Therapy Statistic Total Shocks Delivered 0    Therapy Statistic Total Shocks Aborted 0    Therapy Statistic Total ATP Delivered 1    Therapy Statistic Total  Date Time Start 20170608000000    Therapy Statistic Total  Date Time End 20230323000000    Episode Statistic Recent Count 13    Episode Statistic Type Category Other    Episode Statistic Recent Count 4    Episode Statistic Type Category VT    Episode Statistic Vendor Type Category NSVT    Episode Statistic Recent Count 0    Episode Statistic Type Category VF    Episode Statistic Vendor Type Category VF    Episode Statistic Recent Count 1    Episode Statistic Type Category VT    Episode Statistic Vendor Type Category VT    Episode Statistic Recent Count 0    Episode Statistic Type Category VT    Episode Statistic Vendor Type Category VT-1    Episode Statistic Recent Date Time Start 20210128000000    Episode Statistic  Recent Date Time End 20230323000000    Episode Statistic Recent Date Time Start 20210128000000    Episode Statistic Recent Date Time End 20230323000000    Episode Statistic Recent Date Time Start 20210128000000    Episode Statistic Recent Date Time End 20230323000000    Episode Statistic Recent Date Time Start 20210128000000    Episode Statistic Recent Date Time End 20230323000000    Episode Statistic Recent Date Time Start 20210128000000    Episode Statistic Recent Date Time End 20230323000000    Narrative    Patient seen in [facility] OR 26 for evaluation and iterative programming of   their ICD per MD orders. PRE-OP LVAD PROGRAMMING.     Device: [employer] D150 DYNAGEN  Normal device function.   Mode: VVI 40 bpm  : <1%  Intrinsic Rhythm:  bpm  Lead Trends Appear Stable.   Estimated battery longevity to RRT = 10 years.  Ventricular Arrhythmia: over the last 2 days 1 VT episode at 183 bpm was   successfully converted with 1 ATP sequence. 1 NSVT lasting 4 sec at 181   bpm.  Setting Changes: ICD Tachy Therapies turned OFF per MD orders.    Plan: Page ICD RN for post-op programming  ABRAHAM Washington RN    Single lead ICD    I have reviewed and interpreted the device interrogation, settings,   programming and nurse's summary. The device is functioning within normal   device parameters. I agree with the current findings, assessment and plan.   XR Chest Port 1 View    Narrative    Exam: XR CHEST PORT 1 VIEW, 3/23/2023 2:52 PM    Comparison: Radiograph same day, 3/21/2023, 3/19/2023    History: Post Op CVTS Surgery    Findings:  Portable AP view of the chest. Endotracheal tube tip projects over the  low thoracic trachea. IABP marker projects 2.5 cm above the etienne.  New postsurgical changes of the chest. Median sternotomy wires and  mediastinal clips. Mediastinal drains. Right IJ Frederick-Jalen catheter tip  projects in stable position over the distal right main pulmonary  artery. Partially visualized LVAD. Left chest  wall cardiac device  leads is not fully visualized. Unknown linear density projecting over  the mid to low medial right hemithorax may be external. The left  costophrenic angle is not included in the field-of-view.  Cardiomediastinal silhouette is within normal limits. Mild perihilar  and dense retrocardiac opacities. No appreciable right pleural  effusion. No appreciable pneumothorax. Small amount of subcutaneous  emphysema in the lower neck.      Impression    Impression:  1. Endotracheal tube tip projects over the lower thoracic trachea.  Additional support devices as noted above.  2. New postsurgical changes of the chest with partially visualized  LVAD.   3. Unknown linear density projects over the mid to lower medial right  hemithorax, may be a external to the patient  4. No appreciable pneumothorax.  5. Mild perihilar and dense retrocardiac opacities, likely  atelectasis.    I have personally reviewed the examination and initial interpretation  and I agree with the findings.    CAITLIN RAO MD         SYSTEM ID:  [id number]   XR Abdomen Port 1 View    Narrative    EXAM: XR ABDOMEN PORT 1 VIEW  3/23/2023 2:53 PM      HISTORY: OG placement    COMPARISON: Same-day chest x-ray, CAP 3/17/2023    FINDINGS: Single AP view of the abdomen. Midline sternotomy wires.  Mediastinal drains. LVAD. Partially visualized left chest tube.  Enteric sidehole projects over the gastroesophageal junction.  Partially visualized Dallas-Jalen catheter.    Nonobstructive bowel gas pattern. No pneumatosis. Please see same day  chest x-ray for further thoracic findings. No acute osseous  abnormality.      Impression    IMPRESSION: Enteric tube sidehole projects over the gastroesophageal  junction. Consider advancing 5 cm.    I have personally reviewed the examination and initial interpretation  and I agree with the findings.    HARDEEP FERNANDEZ MD         SYSTEM ID:  [id number]   Cardiac Device Check - Inpatient   Result Value    Date Time  Interrogation Session [id number]    Implantable Pulse Generator  [employer]    Implantable Pulse Generator Model D150 [id number]    Implantable Pulse Generator Serial Number [id number]    Type Interrogation Session In Clinic    Clinic Name [facility]    Implantable Pulse Generator Type Defibrillator    Implantable Pulse Generator Implant Date 20170608    Implantable Lead  [employer]    Implantable Lead Model 0293 Endotak [place] 4-Site SG    Implantable Lead Serial Number [id number]    Implantable Lead Implant Date 20170608    Implantable Lead Polarity Type Bipolar Lead    Implantable Lead Location Detail 1 UNKNOWN    Implantable Lead Location Right Ventricle    Jesus Setting Mode (NBG Code) VVI    Jesus Setting Lower Rate Limit 40    Lead Channel Setting Sensing Polarity Bipolar    Lead Channel Setting Sensing Sensitivity 0.6    Lead Channel Setting Sensing Adaptation Mode Adaptive    Lead Channel Setting Pacing Polarity Bipolar    Lead Channel Setting Pacing Pulse Width 0.4    Lead Channel Setting Pacing Amplitude 2.0    Zone Setting Type Category VF    Zone Setting Vendor Type Category VF    Zone Setting Detection Interval 300    Zone Setting Type Category VT    Zone Setting Vendor Type Category VT    Zone Setting Detection Interval 353    Lead Channel Impedance Value 410    Lead Channel Pacing Threshold Amplitude 1.0    Lead Channel Pacing Threshold Pulse Width 0.4    Battery Date Time of Measurements [id number]    Battery Status Beginning of Service    Battery Remaining Longevity 120    Battery Remaining Percentage 100    Capacitor Charge Type Reformation    Capacitor Last Charge Date Time 20221112043400    Capacitor Charge Time 10.4    Jesus Statistic Date Time Start 20230323000000    Jesus Statistic Date Time End 20230323000000    Jesus Statistic RV Percent Paced 0    Therapy Statistic Recent Shocks Delivered 0    Therapy Statistic Recent Shocks  Aborted 0    Therapy Statistic Recent ATP Delivered 0    Therapy Statistic Recent Date Time Start 20230323000000    Therapy Statistic Recent Date Time End 20230323000000    Therapy Statistic Total Shocks Delivered 0    Therapy Statistic Total Shocks Aborted 0    Therapy Statistic Total ATP Delivered 1    Therapy Statistic Total  Date Time Start 20170608000000    Therapy Statistic Total  Date Time End 20230323000000    Episode Statistic Recent Count 0    Episode Statistic Type Category Other    Episode Statistic Recent Count 0    Episode Statistic Type Category VT    Episode Statistic Vendor Type Category NSVT    Episode Statistic Recent Count 0    Episode Statistic Type Category VF    Episode Statistic Vendor Type Category VF    Episode Statistic Recent Count 0    Episode Statistic Type Category VT    Episode Statistic Vendor Type Category VT    Episode Statistic Recent Count 0    Episode Statistic Type Category VT    Episode Statistic Vendor Type Category VT-1    Episode Statistic Recent Date Time Start 20230323000000    Episode Statistic Recent Date Time End 20230323000000    Episode Statistic Recent Date Time Start 20230323000000    Episode Statistic Recent Date Time End 20230323000000    Episode Statistic Recent Date Time Start 20230323000000    Episode Statistic Recent Date Time End 20230323000000    Episode Statistic Recent Date Time Start 20230323000000    Episode Statistic Recent Date Time End 20230323000000    Episode Statistic Recent Date Time Start 20230323000000    Episode Statistic Recent Date Time End 20230323000000    Narrative    Patient seen in the [facility] CVICU for evaluation and iterative programming of   their ICD per MD orders, post-op LVAD Implant.    Device: [employer] D150 DYNAGEN  Normal device function.   Mode: VVI 40 bpm  Intrinsic Rhythm:  bpm  Estimated battery longevity to RRT = 10 years.    Setting Changes: ICD Tachy Therapies Turned ON.    ABRAHAM Washington RN  Single lead  ICD    I have reviewed and interpreted the device interrogation, settings,   programming and nurse's summary. The device is functioning within normal   device parameters. I agree with the current findings, assessment and plan.       Blood culture:  Results for orders placed or performed during the hospital encounter of 03/15/23   Blood Culture Hand, Right    Specimen: Hand, Right; Blood   Result Value Ref Range    Culture No growth after 4 days    Blood Culture Line, venous    Specimen: Line, venous; Blood   Result Value Ref Range    Culture No growth after 4 days       Urine culture:  No results found for this or any previous visit.    Assessment & Plan    57 year old man with past medical history of HFrEF (EF 10% 5/2022) 2/2 NICM s/p ICD, tobacco use disorder, marijuana use, HTN, lumbar radiculopathy, CKD who presents after being found down at home. Found to have had a prolonged episode of VT with concern for cardiac arrest s/p ROSC in the field now s/p LVAD with HM3 on 03/23/23 and tricuspid annuloplasty.      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 Presented with out of hospital cardiac arrest with VT for 5 min 43 seconds of VT at 199 bpm, with spontaneous conversion to sinus rhythm without defibrillation on device interrogation on 3/ 15/2023. He had  A run of  VT while here, with hypotension on 3/16/18.   - VT secondary prevention ICD in place  - Inotropes: Continue dobutamine at 2.5  - Volume status: euvolemic  - BB: Hold PTA carvedilol 12.5 mg BID  - ACEi/ARB/ARNI: hold PTA  lisinopril 5 mg BID  - afterload reduction: continue hydralazine and isordil titration for MAP goal of 65-75 with PRN hydral as needed   - MRA: hold PTA spironolactone 12.5 mg BID  - SGLT2i: none PTA  - SCD ppx/BiV pacer: ICD, reprogrammed device to provide ATP burst at VT threshold  - Heparin and coumadin when safe from surgical perspective    Marco Santoyo MD  Cardiology Fellow  [phone]

## 2023-03-24 NOTE — PROCEDURES
[facility]    Arterial line placement    Date/Time: 3/24/2023 2:21 PM  Performed by: Balaji Daley MD  Authorized by: Balaji Daley MD       UNIVERSAL PROTOCOL   Site Marked: Yes  Prior Images Obtained and Reviewed:  Yes  Required items: Required blood products, implants, devices and special equipment available    Patient identity confirmed:  Hospital-assigned identification number  NA - No sedation, light sedation, or local anesthesia  Confirmation Checklist:  Patient's identity using two indicators  Time out: Immediately prior to the procedure a time out was called    Universal Protocol: the Joint Commission Universal Protocol was followed    Preparation: Patient was prepped and draped in usual sterile fashion    ESBL (mL):  3  Indication: hemodynamic monitoring  Location: left radial       ANESTHESIA    Anesthesia: Local infiltration  Local Anesthetic:  Lidocaine 1% with epinephrine  Anesthetic Total (mL):  3      SEDATION  Patient Sedated: Yes    Vital signs: Vital signs monitored during sedation      PROCEDURE DETAILS  Nick's Test Normal?: Nick's test not abnormal  Needle Gauge:  18  Seldinger technique: Seldinger technique used            PROCEDURE  Describe Procedure: Good arterial waveform.  Patient Tolerance:  Patient tolerated the procedure well with no immediate complications  Length of time physician/provider present for 1:1 monitoring during sedation: 15

## 2023-03-24 NOTE — PLAN OF CARE
Shift Highlights:  Weaned from sedation and ventilator this shift. Extubated at 1700, unlabored breathing on 4 lpm nc. Up to chair with assist of 2.    Sinus tach with HR 100s, increased to 120-130s off sedation and with activity. LVAD with pump flow 3.2-4.2, speed 5200, PI 3.8-9. Started on PO hydralazine for MAP >90, PRN hydralazine given x1.     Urine output 20-30 ml/hr.    Gtts: Started dobutamine at 2.5 mcg, stopped epi. Started heparin gtt at 500 units/hr.    See flowsheet for full assessment data.     Goal Outcome Evaluation:    Plan of Care Reviewed With: patient, spouse, child    Overall Patient Progress: improving    Outcome Evaluation: Off sedation, extubated. Tolerating clear liquids. BP managed with hydralazine.

## 2023-03-24 NOTE — PROGRESS NOTES
[facility], Procedure Note          Extubation:       Akshat Fragoso  MRN# [id number]   March 24, 2023, 5:08 PM         Patient extubated at: March 24, 2023, 5:08 PM   Supplemental Oxygen: Via nasal cannula at 4 liters per minute   Cough: The cough is good   Secretion Mode: Able to clear   Secretion Amount: Large amount, moderately thick and white / yellow in color   Respiratory Exam:: Breath sounds: diminished     Location: bilaterally   Skin Exam:: Patient color: natural   Patient Status: Currently appears comfortable   Arterial Blood Gasses: pH Arterial (no units)   Date Value   03/24/2023 7.42     pO2 Arterial (mm Hg)   Date Value   03/24/2023 95     pCO2 Arterial (mm Hg)   Date Value   03/24/2023 42     Bicarbonate Arterial (mmol/L)   Date Value   03/24/2023 27            Recorded by Kasey Ardon, RT

## 2023-03-24 NOTE — PROGRESS NOTES
CLINICAL NUTRITION SERVICES - REASSESSMENT NOTE     Nutrition Prescription    RECOMMENDATIONS FOR MDs/PROVIDERS TO ORDER:  -Recommend initiating trickle feeds via OGT if unable to extubate today: TwoCal @ 20 ml/hr  -Consult radiology if post-pyloric FT placement needed    Malnutrition Status:    Patient does not meet two of the established criteria necessary for diagnosing malnutrition    Recommendations already ordered by Registered Dietitian (RD):  -None at this time     Future/Additional Recommendations:  -If EN becomes nutrition POC, recommend:  TwoCal HN @ 40 mL/hr (960 mL/day) + 1 pkt Prosource TF20 to provide 2000 kcals (30 kcal/kg/day), 101 g PRO (1.5 g/kg/day), 672 mL H2O, 210 g CHO and 5 g Fiber daily.     EVALUATION OF THE PROGRESS TOWARD GOALS   Diet: NPO    Intake: Pt was eating well prior to surgery. % PO intake per RN documentation. Pt was ordering 2-3 meals/d. Ordered items such as turkey, pot roast, mashed potatoes, fruit, salad, cookies, corn, carrots, yogurt, grilled cheese, soup.      NEW FINDINGS   Pt s/p LVAD insertion and TVR. Currently intubated, possible extubation today.     GI: LBM 3/22.     Labs: Reviewed - hyperphosphatemia, elevated lactic acid     Medications: Reviewed     Weights: No weight loss over the past week.   03/24/23 0400 81.1 kg (178 lb 12.7 oz)   03/23/23 0000 80.5 kg (177 lb 7.5 oz)   03/22/23 0544 73.4 kg (161 lb 13.1 oz)   03/21/23 0400 75.3 kg (166 lb 0.1 oz)   03/20/23 0600 72.2 kg (159 lb 2.8 oz)   03/19/23 0530 72.6 kg (160 lb 0.9 oz)   03/18/23 0400 73.2 kg (161 lb 6 oz)   03/17/23 0400 74.6 kg (164 lb 7.4 oz)     MALNUTRITION  % Intake: Decreased intake does not meet criteria  % Weight Loss: None noted  Subcutaneous Fat Loss: None observed  Muscle Loss: Temporal:  Mild-moderate  Fluid Accumulation/Edema: None noted  Malnutrition Diagnosis: Patient does not meet two of the established criteria necessary for diagnosing malnutrition    Previous Goals   Patient  to consume % of nutritionally adequate meal trays TID, or the equivalent with supplements/snacks.  Evaluation: Met    Previous Nutrition Diagnosis  Food- and nutrition-related knowledge deficit related to diet/nutrition recommendations if pursues LVAD as evidenced by RD referral during LVAD evaluation process.    Evaluation: No longer applicable, nutrition diagnosis changed below    CURRENT NUTRITION DIAGNOSIS  Inadequate oral intake related to intubation as evidenced by NPO status.       INTERVENTIONS  Implementation  -Enteral Nutrition - Initiate if unable to extubate   -Received provider consult for nutrition education with comments post op cardiovascular surgery (automatic consult on post-op order set). S/p LVAD and TVR on 3/23. Nutrition education to be completed as able/appropriate (as pt s/p CABG and/or initial VAD).    Goals  Patient to consume % of nutritionally adequate meal trays TID, or the equivalent with supplements/snacks.    Monitoring/Evaluation  Progress toward goals will be monitored and evaluated per protocol.    An Brooks, MS, RD, LD  4E (CVICU) RD pager: [phone]  Ascom: [id number]  Weekend/Holiday RD pager: [phone]

## 2023-03-24 NOTE — PLAN OF CARE
Goal Outcome Evaluation:      Plan of Care Reviewed With: patient, spouse, child    Overall Patient Progress: no changeOverall Patient Progress: no change    Outcome Evaluation: LVAD placed today, post-op recovery stable, IABP pulled    Assessment: Pt intubated and sedated on propofol and fentanyl, follows commands, CHIANG. Pain well controlled. Pupils equal. Map goal 65-80, maintained w/Epi gtt, leave at 0.02mcg/kg/min overnight per Dr Matias, weaned off Angiotension, Levo and Vaso. SR/ST 80-100s, frequent PVCs, intermittent bigeminy. Upon arrival LVAD flows 2.3-2.8, PIs 11-13, low flow alarm at 1720, Dr Matias and Dr Aguiar ordered to pull IABP d/t high PIs. IABP pulled at 1815 by CVTS Fellow, hematoma at site, additional pressure held by provider prior to Femstop placement. Site improving, Femstop at 0 at approx 1900, bedrest until 0015. Last NITA at 1800: CVP 8, PA 30/16, CO 4, CI 2, SVR 1498, SvO2 64. Total 500ml LR given. K+ 3.3 - replaced, Phos 2.3 - replacement infusing. CTs x4, pleurals w/10-70cc/hr, med w/30cc output total. Samson at 10. ICD turned back on by pacer RN. CMV vent settings: 40%/480/18/5, CO2 normalized w/RR and TV increase. Minimal secretions. Adkins w/adequate UOP. Insulin gtt off, recheck at 2000. OG to LIS w/150cc output.  Pt's spouse and children updated at bedside.     Plan: Maintain map 65-80, keep Epi gtt at 0.02mcg/kg/min overnight. Maintain Samson at 10 overnight, wean tomorrow.     GTTs:  Epinephrine 0.02mcg/kg/min  Propofol 30mcg/kg/min  Fentanyl 50mcg/hr  TKO 5ml/hr    Evert Fuentes RN  Hours cared for: [phone]

## 2023-03-25 LAB
ALBUMIN SERPL BCG-MCNC: 2.9 G/DL (ref 3.5–5.2)
ALBUMIN SERPL BCG-MCNC: 3 G/DL (ref 3.5–5.2)
ALBUMIN SERPL BCG-MCNC: 3.2 G/DL (ref 3.5–5.2)
ALP SERPL-CCNC: 75 U/L (ref 40–129)
ALP SERPL-CCNC: 82 U/L (ref 40–129)
ALP SERPL-CCNC: 84 U/L (ref 40–129)
ALT SERPL W P-5'-P-CCNC: 36 U/L (ref 10–50)
ALT SERPL W P-5'-P-CCNC: 38 U/L (ref 10–50)
ALT SERPL W P-5'-P-CCNC: 38 U/L (ref 10–50)
ANION GAP SERPL CALCULATED.3IONS-SCNC: 10 MMOL/L (ref 7–15)
ANION GAP SERPL CALCULATED.3IONS-SCNC: 12 MMOL/L (ref 7–15)
ANION GAP SERPL CALCULATED.3IONS-SCNC: 12 MMOL/L (ref 7–15)
AST SERPL W P-5'-P-CCNC: 152 U/L (ref 10–50)
AST SERPL W P-5'-P-CCNC: 197 U/L (ref 10–50)
AST SERPL W P-5'-P-CCNC: 211 U/L (ref 10–50)
BACTERIA BLD CULT: NO GROWTH
BASE EXCESS BLDV CALC-SCNC: 1.5 MMOL/L (ref -7.7–1.9)
BASE EXCESS BLDV CALC-SCNC: 2.2 MMOL/L (ref -7.7–1.9)
BASE EXCESS BLDV CALC-SCNC: 6.3 MMOL/L (ref -7.7–1.9)
BASE EXCESS BLDV CALC-SCNC: 7.4 MMOL/L (ref -7.7–1.9)
BASE EXCESS BLDV CALC-SCNC: 8.1 MMOL/L (ref -7.7–1.9)
BASE EXCESS BLDV CALC-SCNC: 8.6 MMOL/L (ref -7.7–1.9)
BILIRUB SERPL-MCNC: 4 MG/DL
BILIRUB SERPL-MCNC: 4.4 MG/DL
BILIRUB SERPL-MCNC: 4.8 MG/DL
BUN SERPL-MCNC: 21.9 MG/DL (ref 6–20)
BUN SERPL-MCNC: 23.3 MG/DL (ref 6–20)
BUN SERPL-MCNC: 25.2 MG/DL (ref 6–20)
CA-I BLD-MCNC: 4.7 MG/DL (ref 4.4–5.2)
CA-I BLD-MCNC: 4.7 MG/DL (ref 4.4–5.2)
CALCIUM SERPL-MCNC: 8.5 MG/DL (ref 8.6–10)
CALCIUM SERPL-MCNC: 8.9 MG/DL (ref 8.6–10)
CALCIUM SERPL-MCNC: 9.2 MG/DL (ref 8.6–10)
CHLORIDE SERPL-SCNC: 88 MMOL/L (ref 98–107)
CHLORIDE SERPL-SCNC: 90 MMOL/L (ref 98–107)
CHLORIDE SERPL-SCNC: 92 MMOL/L (ref 98–107)
CREAT SERPL-MCNC: 0.69 MG/DL (ref 0.67–1.17)
CREAT SERPL-MCNC: 0.81 MG/DL (ref 0.67–1.17)
CREAT SERPL-MCNC: 1.01 MG/DL (ref 0.67–1.17)
DEPRECATED HCO3 PLAS-SCNC: 27 MMOL/L (ref 22–29)
DEPRECATED HCO3 PLAS-SCNC: 27 MMOL/L (ref 22–29)
DEPRECATED HCO3 PLAS-SCNC: 28 MMOL/L (ref 22–29)
ERYTHROCYTE [DISTWIDTH] IN BLOOD BY AUTOMATED COUNT: 14.7 % (ref 10–15)
ERYTHROCYTE [DISTWIDTH] IN BLOOD BY AUTOMATED COUNT: 15 % (ref 10–15)
GFR SERPL CREATININE-BSD FRML MDRD: 88 ML/MIN/1.73M2
GFR SERPL CREATININE-BSD FRML MDRD: >90 ML/MIN/1.73M2
GFR SERPL CREATININE-BSD FRML MDRD: >90 ML/MIN/1.73M2
GLUCOSE BLDC GLUCOMTR-MCNC: 113 MG/DL (ref 70–99)
GLUCOSE BLDC GLUCOMTR-MCNC: 118 MG/DL (ref 70–99)
GLUCOSE BLDC GLUCOMTR-MCNC: 129 MG/DL (ref 70–99)
GLUCOSE BLDC GLUCOMTR-MCNC: 133 MG/DL (ref 70–99)
GLUCOSE BLDC GLUCOMTR-MCNC: 164 MG/DL (ref 70–99)
GLUCOSE BLDC GLUCOMTR-MCNC: 336 MG/DL (ref 70–99)
GLUCOSE SERPL-MCNC: 117 MG/DL (ref 70–99)
GLUCOSE SERPL-MCNC: 135 MG/DL (ref 70–99)
GLUCOSE SERPL-MCNC: 159 MG/DL (ref 70–99)
HCO3 BLDV-SCNC: 27 MMOL/L (ref 21–28)
HCO3 BLDV-SCNC: 29 MMOL/L (ref 21–28)
HCO3 BLDV-SCNC: 33 MMOL/L (ref 21–28)
HCO3 BLDV-SCNC: 34 MMOL/L (ref 21–28)
HCO3 BLDV-SCNC: 35 MMOL/L (ref 21–28)
HCO3 BLDV-SCNC: 35 MMOL/L (ref 21–28)
HCT VFR BLD AUTO: 37.3 % (ref 40–53)
HCT VFR BLD AUTO: 39 % (ref 40–53)
HGB BLD-MCNC: 11.9 G/DL (ref 13.3–17.7)
HGB BLD-MCNC: 12.5 G/DL (ref 13.3–17.7)
INR PPP: 1.31 (ref 0.85–1.15)
LACTATE SERPL-SCNC: 1.2 MMOL/L (ref 0.7–2)
LACTATE SERPL-SCNC: 1.5 MMOL/L (ref 0.7–2)
LACTATE SERPL-SCNC: 1.9 MMOL/L (ref 0.7–2)
MAGNESIUM SERPL-MCNC: 2.1 MG/DL (ref 1.7–2.3)
MCH RBC QN AUTO: 30.1 PG (ref 26.5–33)
MCH RBC QN AUTO: 30.4 PG (ref 26.5–33)
MCHC RBC AUTO-ENTMCNC: 31.9 G/DL (ref 31.5–36.5)
MCHC RBC AUTO-ENTMCNC: 32.1 G/DL (ref 31.5–36.5)
MCV RBC AUTO: 94 FL (ref 78–100)
MCV RBC AUTO: 95 FL (ref 78–100)
O2/TOTAL GAS SETTING VFR VENT: 2 %
O2/TOTAL GAS SETTING VFR VENT: 21 %
O2/TOTAL GAS SETTING VFR VENT: 4 %
OXYHGB MFR BLDV: 45 % (ref 70–75)
OXYHGB MFR BLDV: 49 % (ref 70–75)
OXYHGB MFR BLDV: 60 % (ref 70–75)
OXYHGB MFR BLDV: 66 % (ref 70–75)
OXYHGB MFR BLDV: 69 % (ref 70–75)
OXYHGB MFR BLDV: 69 % (ref 70–75)
PCO2 BLDV: 48 MM HG (ref 40–50)
PCO2 BLDV: 54 MM HG (ref 40–50)
PCO2 BLDV: 55 MM HG (ref 40–50)
PCO2 BLDV: 56 MM HG (ref 40–50)
PCO2 BLDV: 58 MM HG (ref 40–50)
PCO2 BLDV: 60 MM HG (ref 40–50)
PH BLDV: 7.33 [PH] (ref 7.32–7.43)
PH BLDV: 7.37 [PH] (ref 7.32–7.43)
PH BLDV: 7.38 [PH] (ref 7.32–7.43)
PH BLDV: 7.38 [PH] (ref 7.32–7.43)
PH BLDV: 7.39 [PH] (ref 7.32–7.43)
PH BLDV: 7.4 [PH] (ref 7.32–7.43)
PHOSPHATE SERPL-MCNC: 3.6 MG/DL (ref 2.5–4.5)
PLATELET # BLD AUTO: 144 10E3/UL (ref 150–450)
PLATELET # BLD AUTO: 162 10E3/UL (ref 150–450)
PO2 BLDV: 28 MM HG (ref 25–47)
PO2 BLDV: 30 MM HG (ref 25–47)
PO2 BLDV: 35 MM HG (ref 25–47)
PO2 BLDV: 39 MM HG (ref 25–47)
PO2 BLDV: 39 MM HG (ref 25–47)
PO2 BLDV: 40 MM HG (ref 25–47)
POTASSIUM SERPL-SCNC: 3.9 MMOL/L (ref 3.4–5.3)
POTASSIUM SERPL-SCNC: 4 MMOL/L (ref 3.4–5.3)
POTASSIUM SERPL-SCNC: 4.4 MMOL/L (ref 3.4–5.3)
PROT SERPL-MCNC: 5.1 G/DL (ref 6.4–8.3)
PROT SERPL-MCNC: 5.3 G/DL (ref 6.4–8.3)
PROT SERPL-MCNC: 5.5 G/DL (ref 6.4–8.3)
RBC # BLD AUTO: 3.92 10E6/UL (ref 4.4–5.9)
RBC # BLD AUTO: 4.15 10E6/UL (ref 4.4–5.9)
SODIUM SERPL-SCNC: 128 MMOL/L (ref 136–145)
SODIUM SERPL-SCNC: 129 MMOL/L (ref 136–145)
SODIUM SERPL-SCNC: 129 MMOL/L (ref 136–145)
UFH PPP CHRO-ACNC: <0.1 IU/ML
UFH PPP CHRO-ACNC: <0.1 IU/ML
WBC # BLD AUTO: 22.7 10E3/UL (ref 4–11)
WBC # BLD AUTO: 24.1 10E3/UL (ref 4–11)

## 2023-03-25 PROCEDURE — 82330 ASSAY OF CALCIUM: CPT | Performed by: STUDENT IN AN ORGANIZED HEALTH CARE EDUCATION/TRAINING PROGRAM

## 2023-03-25 PROCEDURE — 85610 PROTHROMBIN TIME: CPT | Performed by: STUDENT IN AN ORGANIZED HEALTH CARE EDUCATION/TRAINING PROGRAM

## 2023-03-25 PROCEDURE — 85027 COMPLETE CBC AUTOMATED: CPT | Performed by: STUDENT IN AN ORGANIZED HEALTH CARE EDUCATION/TRAINING PROGRAM

## 2023-03-25 PROCEDURE — 82805 BLOOD GASES W/O2 SATURATION: CPT | Performed by: STUDENT IN AN ORGANIZED HEALTH CARE EDUCATION/TRAINING PROGRAM

## 2023-03-25 PROCEDURE — 258N000003 HC RX IP 258 OP 636: Performed by: NURSE PRACTITIONER

## 2023-03-25 PROCEDURE — 99291 CRITICAL CARE FIRST HOUR: CPT | Mod: 25 | Performed by: INTERNAL MEDICINE

## 2023-03-25 PROCEDURE — 93005 ELECTROCARDIOGRAM TRACING: CPT

## 2023-03-25 PROCEDURE — 250N000011 HC RX IP 250 OP 636: Performed by: STUDENT IN AN ORGANIZED HEALTH CARE EDUCATION/TRAINING PROGRAM

## 2023-03-25 PROCEDURE — 84100 ASSAY OF PHOSPHORUS: CPT | Performed by: INTERNAL MEDICINE

## 2023-03-25 PROCEDURE — 258N000003 HC RX IP 258 OP 636: Performed by: STUDENT IN AN ORGANIZED HEALTH CARE EDUCATION/TRAINING PROGRAM

## 2023-03-25 PROCEDURE — 250N000011 HC RX IP 250 OP 636

## 2023-03-25 PROCEDURE — 250N000013 HC RX MED GY IP 250 OP 250 PS 637: Performed by: INTERNAL MEDICINE

## 2023-03-25 PROCEDURE — 93010 ELECTROCARDIOGRAM REPORT: CPT | Performed by: INTERNAL MEDICINE

## 2023-03-25 PROCEDURE — 200N000002 HC R&B ICU UMMC

## 2023-03-25 PROCEDURE — 84450 TRANSFERASE (AST) (SGOT): CPT | Performed by: STUDENT IN AN ORGANIZED HEALTH CARE EDUCATION/TRAINING PROGRAM

## 2023-03-25 PROCEDURE — 250N000013 HC RX MED GY IP 250 OP 250 PS 637: Performed by: STUDENT IN AN ORGANIZED HEALTH CARE EDUCATION/TRAINING PROGRAM

## 2023-03-25 PROCEDURE — 80053 COMPREHEN METABOLIC PANEL: CPT | Performed by: STUDENT IN AN ORGANIZED HEALTH CARE EDUCATION/TRAINING PROGRAM

## 2023-03-25 PROCEDURE — 83735 ASSAY OF MAGNESIUM: CPT | Performed by: INTERNAL MEDICINE

## 2023-03-25 PROCEDURE — 83605 ASSAY OF LACTIC ACID: CPT | Performed by: STUDENT IN AN ORGANIZED HEALTH CARE EDUCATION/TRAINING PROGRAM

## 2023-03-25 PROCEDURE — 250N000011 HC RX IP 250 OP 636: Performed by: NURSE PRACTITIONER

## 2023-03-25 PROCEDURE — 250N000012 HC RX MED GY IP 250 OP 636 PS 637: Performed by: STUDENT IN AN ORGANIZED HEALTH CARE EDUCATION/TRAINING PROGRAM

## 2023-03-25 PROCEDURE — 85520 HEPARIN ASSAY: CPT | Performed by: INTERNAL MEDICINE

## 2023-03-25 PROCEDURE — C9113 INJ PANTOPRAZOLE SODIUM, VIA: HCPCS | Performed by: INTERNAL MEDICINE

## 2023-03-25 PROCEDURE — 84155 ASSAY OF PROTEIN SERUM: CPT | Performed by: STUDENT IN AN ORGANIZED HEALTH CARE EDUCATION/TRAINING PROGRAM

## 2023-03-25 PROCEDURE — 93750 INTERROGATION VAD IN PERSON: CPT | Performed by: INTERNAL MEDICINE

## 2023-03-25 PROCEDURE — 250N000011 HC RX IP 250 OP 636: Performed by: INTERNAL MEDICINE

## 2023-03-25 PROCEDURE — 85520 HEPARIN ASSAY: CPT | Performed by: STUDENT IN AN ORGANIZED HEALTH CARE EDUCATION/TRAINING PROGRAM

## 2023-03-25 PROCEDURE — 99291 CRITICAL CARE FIRST HOUR: CPT | Mod: 24 | Performed by: ANESTHESIOLOGY

## 2023-03-25 PROCEDURE — 82330 ASSAY OF CALCIUM: CPT | Performed by: THORACIC SURGERY (CARDIOTHORACIC VASCULAR SURGERY)

## 2023-03-25 RX ORDER — DEXTROSE MONOHYDRATE 25 G/50ML
25-50 INJECTION, SOLUTION INTRAVENOUS
Status: DISCONTINUED | OUTPATIENT
Start: 2023-03-25 | End: 2023-04-02

## 2023-03-25 RX ORDER — DIGOXIN 125 MCG
250 TABLET ORAL DAILY
Status: DISCONTINUED | OUTPATIENT
Start: 2023-03-26 | End: 2023-04-09 | Stop reason: HOSPADM

## 2023-03-25 RX ORDER — WARFARIN SODIUM 3 MG/1
3 TABLET ORAL
Status: COMPLETED | OUTPATIENT
Start: 2023-03-25 | End: 2023-03-25

## 2023-03-25 RX ORDER — DIGOXIN 0.25 MG/ML
500 INJECTION INTRAMUSCULAR; INTRAVENOUS ONCE
Status: COMPLETED | OUTPATIENT
Start: 2023-03-25 | End: 2023-03-25

## 2023-03-25 RX ORDER — HEPARIN SODIUM 10000 [USP'U]/100ML
0-5000 INJECTION, SOLUTION INTRAVENOUS CONTINUOUS
Status: DISCONTINUED | OUTPATIENT
Start: 2023-03-25 | End: 2023-03-25

## 2023-03-25 RX ORDER — HEPARIN SODIUM 10000 [USP'U]/100ML
0-5000 INJECTION, SOLUTION INTRAVENOUS CONTINUOUS
Status: DISCONTINUED | OUTPATIENT
Start: 2023-03-25 | End: 2023-03-28

## 2023-03-25 RX ORDER — BUMETANIDE 0.25 MG/ML
3 INJECTION INTRAMUSCULAR; INTRAVENOUS ONCE
Status: COMPLETED | OUTPATIENT
Start: 2023-03-25 | End: 2023-03-25

## 2023-03-25 RX ORDER — DOBUTAMINE HYDROCHLORIDE 200 MG/100ML
2.5 INJECTION INTRAVENOUS CONTINUOUS
Status: DISCONTINUED | OUTPATIENT
Start: 2023-03-25 | End: 2023-03-26

## 2023-03-25 RX ORDER — LIDOCAINE 4 G/G
1 PATCH TOPICAL
Status: DISCONTINUED | OUTPATIENT
Start: 2023-03-25 | End: 2023-04-09 | Stop reason: HOSPADM

## 2023-03-25 RX ORDER — CAPTOPRIL 12.5 MG/1
12.5 TABLET ORAL EVERY 8 HOURS SCHEDULED
Status: DISCONTINUED | OUTPATIENT
Start: 2023-03-25 | End: 2023-03-25

## 2023-03-25 RX ORDER — BUMETANIDE 0.25 MG/ML
2 INJECTION INTRAMUSCULAR; INTRAVENOUS ONCE
Status: DISCONTINUED | OUTPATIENT
Start: 2023-03-25 | End: 2023-03-25

## 2023-03-25 RX ORDER — DIGOXIN 250 MCG
250 TABLET ORAL EVERY 6 HOURS
Status: COMPLETED | OUTPATIENT
Start: 2023-03-25 | End: 2023-03-25

## 2023-03-25 RX ORDER — GABAPENTIN 300 MG/1
300 CAPSULE ORAL 3 TIMES DAILY
Status: DISCONTINUED | OUTPATIENT
Start: 2023-03-25 | End: 2023-04-09 | Stop reason: HOSPADM

## 2023-03-25 RX ORDER — NICOTINE POLACRILEX 4 MG
15-30 LOZENGE BUCCAL
Status: DISCONTINUED | OUTPATIENT
Start: 2023-03-25 | End: 2023-04-02

## 2023-03-25 RX ADMIN — HYDROMORPHONE HYDROCHLORIDE 0.2 MG: 0.2 INJECTION, SOLUTION INTRAMUSCULAR; INTRAVENOUS; SUBCUTANEOUS at 20:59

## 2023-03-25 RX ADMIN — POLYETHYLENE GLYCOL 3350 17 G: 17 POWDER, FOR SOLUTION ORAL at 10:17

## 2023-03-25 RX ADMIN — HYDRALAZINE HYDROCHLORIDE 25 MG: 25 TABLET, FILM COATED ORAL at 09:18

## 2023-03-25 RX ADMIN — PANTOPRAZOLE SODIUM 40 MG: 40 INJECTION, POWDER, FOR SOLUTION INTRAVENOUS at 20:40

## 2023-03-25 RX ADMIN — ASPIRIN 81 MG 81 MG: 81 TABLET ORAL at 10:17

## 2023-03-25 RX ADMIN — SENNOSIDES AND DOCUSATE SODIUM 1 TABLET: 8.6; 5 TABLET ORAL at 20:40

## 2023-03-25 RX ADMIN — AMIODARONE HYDROCHLORIDE 1 MG/MIN: 50 INJECTION, SOLUTION INTRAVENOUS at 12:58

## 2023-03-25 RX ADMIN — HYDROMORPHONE HYDROCHLORIDE 0.4 MG: 0.2 INJECTION, SOLUTION INTRAMUSCULAR; INTRAVENOUS; SUBCUTANEOUS at 14:02

## 2023-03-25 RX ADMIN — VANCOMYCIN HYDROCHLORIDE 1500 MG: 10 INJECTION, POWDER, LYOPHILIZED, FOR SOLUTION INTRAVENOUS at 11:56

## 2023-03-25 RX ADMIN — RIFAMPIN 600 MG: 600 INJECTION, POWDER, LYOPHILIZED, FOR SOLUTION INTRAVENOUS at 09:20

## 2023-03-25 RX ADMIN — SENNOSIDES AND DOCUSATE SODIUM 1 TABLET: 8.6; 5 TABLET ORAL at 10:17

## 2023-03-25 RX ADMIN — DIGOXIN 250 MCG: 250 TABLET ORAL at 16:25

## 2023-03-25 RX ADMIN — SODIUM CHLORIDE, POTASSIUM CHLORIDE, SODIUM LACTATE AND CALCIUM CHLORIDE 250 ML: 600; 310; 30; 20 INJECTION, SOLUTION INTRAVENOUS at 12:47

## 2023-03-25 RX ADMIN — ONDANSETRON 4 MG: 2 INJECTION INTRAMUSCULAR; INTRAVENOUS at 11:41

## 2023-03-25 RX ADMIN — PANTOPRAZOLE SODIUM 40 MG: 40 INJECTION, POWDER, FOR SOLUTION INTRAVENOUS at 09:47

## 2023-03-25 RX ADMIN — HYDROMORPHONE HYDROCHLORIDE 0.4 MG: 0.2 INJECTION, SOLUTION INTRAMUSCULAR; INTRAVENOUS; SUBCUTANEOUS at 07:33

## 2023-03-25 RX ADMIN — AMIODARONE HYDROCHLORIDE 150 MG: 1.5 INJECTION, SOLUTION INTRAVENOUS at 12:42

## 2023-03-25 RX ADMIN — ACETAMINOPHEN 975 MG: 325 TABLET, FILM COATED ORAL at 05:32

## 2023-03-25 RX ADMIN — Medication 6.25 MG: at 21:54

## 2023-03-25 RX ADMIN — INSULIN ASPART 1 UNITS: 100 INJECTION, SOLUTION INTRAVENOUS; SUBCUTANEOUS at 18:51

## 2023-03-25 RX ADMIN — LEVOFLOXACIN 500 MG: 5 INJECTION, SOLUTION INTRAVENOUS at 07:33

## 2023-03-25 RX ADMIN — WARFARIN SODIUM 3 MG: 3 TABLET ORAL at 17:14

## 2023-03-25 RX ADMIN — ACETAMINOPHEN 975 MG: 325 TABLET, FILM COATED ORAL at 21:49

## 2023-03-25 RX ADMIN — OXYCODONE HYDROCHLORIDE 10 MG: 10 TABLET ORAL at 17:14

## 2023-03-25 RX ADMIN — OXYCODONE HYDROCHLORIDE 10 MG: 10 TABLET ORAL at 09:17

## 2023-03-25 RX ADMIN — CAPTOPRIL 12.5 MG: 12.5 TABLET ORAL at 10:24

## 2023-03-25 RX ADMIN — OXYCODONE HYDROCHLORIDE 10 MG: 10 TABLET ORAL at 04:45

## 2023-03-25 RX ADMIN — DOBUTAMINE HYDROCHLORIDE 2.5 MCG/KG/MIN: 200 INJECTION INTRAVENOUS at 23:45

## 2023-03-25 RX ADMIN — HYDROMORPHONE HYDROCHLORIDE 0.4 MG: 0.2 INJECTION, SOLUTION INTRAMUSCULAR; INTRAVENOUS; SUBCUTANEOUS at 11:20

## 2023-03-25 RX ADMIN — ACETAMINOPHEN 975 MG: 325 TABLET, FILM COATED ORAL at 13:11

## 2023-03-25 RX ADMIN — OXYCODONE HYDROCHLORIDE 10 MG: 10 TABLET ORAL at 21:50

## 2023-03-25 RX ADMIN — GABAPENTIN 300 MG: 300 CAPSULE ORAL at 20:40

## 2023-03-25 RX ADMIN — GABAPENTIN 300 MG: 300 CAPSULE ORAL at 10:25

## 2023-03-25 RX ADMIN — OXYCODONE HYDROCHLORIDE 10 MG: 10 TABLET ORAL at 13:13

## 2023-03-25 RX ADMIN — HEPARIN SODIUM 1250 UNITS/HR: 10000 INJECTION, SOLUTION INTRAVENOUS at 16:32

## 2023-03-25 RX ADMIN — FLUCONAZOLE IN SODIUM CHLORIDE 200 MG: 2 INJECTION, SOLUTION INTRAVENOUS at 10:44

## 2023-03-25 RX ADMIN — BUMETANIDE 3 MG: 0.25 INJECTION INTRAMUSCULAR; INTRAVENOUS at 09:47

## 2023-03-25 RX ADMIN — DIGOXIN 500 MCG: 250 INJECTION, SOLUTION INTRAMUSCULAR; INTRAVENOUS at 10:25

## 2023-03-25 RX ADMIN — DIGOXIN 250 MCG: 250 TABLET ORAL at 21:50

## 2023-03-25 RX ADMIN — GABAPENTIN 300 MG: 300 CAPSULE ORAL at 13:11

## 2023-03-25 RX ADMIN — LIDOCAINE PATCH 4% 1 PATCH: 40 PATCH TOPICAL at 10:24

## 2023-03-25 ASSESSMENT — ACTIVITIES OF DAILY LIVING (ADL)
ADLS_ACUITY_SCORE: 28
ADLS_ACUITY_SCORE: 32
ADLS_ACUITY_SCORE: 28
ADLS_ACUITY_SCORE: 32
ADLS_ACUITY_SCORE: 32
ADLS_ACUITY_SCORE: 28

## 2023-03-25 NOTE — PROGRESS NOTES
CV ICU PROGRESS NOTE  March 25, 2023   Admitted: 3/15/2023  8:01 PM    ASSESSMENT: 57 year old male with PMH of HFrEF (10%) secondary to NICM (suspected viral etiology) s/p ICD, chronic tobacco/marijuana use, COPD, HTN, CKD stage III, who presented to [facility] on 3/15 after being found down from a VT arrest. CPR was performed by family. Spontaneously converted to NSR without defibrillation. Down time suspected to be ~ 6 minutes. Presents to [facility] for LVAD placement with preop IABP by Dr. Matias on 3/23/23. He arrives to the CVICU intubated and sedated on propofol gtt s/p LVAD (Heartmate III) placement, TVR, and PFO repair with Dr. Matias and Dr. Waller 3/23/22.    CO-MORBIDITIES:       Patient Active Problem List   Diagnosis     CHF (congestive heart failure) (H)     Cardiomyopathy, nonischemic (H)     Personal history of tobacco use, presenting hazards to health     ICD (implantable cardioverter-defibrillator) in place- mobileo, single chamber- NOT dependent     Chronic systolic heart failure (H)     JOHNSON (dyspnea on exertion)     Acute on chronic systolic congestive heart failure (H)     Hypertrophic nonobstructive cardiomyopathy (H)     Other ill-defined heart diseases     Erectile dysfunction, unspecified erectile dysfunction type     COPD, severe (H)     Ventricular tachycardia      Overnight and today's changes:  Low intensity heparin started with bridge to warfarin   bumex per cards  Dobutamine for RV support  Add lidocaine patches  Lowered dose of robaxin  Increasing gabapentin to 300TID  Adding captopril 12.5mg TID for afterload reduction  -Digoxin 500, 250, 250 today and then reassess tomorrow   MAP goal 65-85  Regular diet with 2L fluid restriction  Medium dose sliding scale insulin added  PA catheter removed  Remove muro    PLAN:  Neuro/ pain/ sedation:  - Monitor neurological status. Notify the MD for any acute changes in exam.    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 Left ventricular function is severely reduced. The ejection fraction is 5-10%. Global right ventricular function is normal. Moderate tricuspid insufficiency. Estimated pulmonary artery systolic pressure is 46 mmHg. Dilation of the inferior vena cava is present with abnormal respiratory variation in diameter.    -dobutamine for inotropic and support  - Monitor hemodynamic status.   - Goal MAP >65  - SCD ppx/BiV pacer: ICD, reprogrammed device to provide ATP burst at VT threshold.  - cardiology consult and management appreciated  - captopril started, digoxin started, hydralazine  - diuresis with net negative and CVP goal 10-12  - warfarin initiated    GI care / Nutrition:   # Elevated LFT, stable  # risk of malnutrition  - Nutrition consulted, appreciate recommendations  - PPI   - Bowel regimen: MiraLAX, Senna  - advanced diet, 2 liter water restriction     Renal / Fluids / Electrolytes:   #CKDIII  Cr 1.2 from 1.1  - Strict I/O, daily weights  - Avoid/limit nephrotoxins as able  - Replete lytes PRN per protocol.      Endocrine:    #Stress hyperglycemia  - Goal BG <180 for optimal healing  - MSSI PRN     ID / Antibiotics:  #Stress induced leukocytosis  - No s/sx infection  - completed perioperative regimen  - Continue to monitor fever curve, WBC, and inflammatory markers as appropriate     Heme:     # Acute blood loss anemia  # Anemia of chronic  disease   No s/sx active bleeding  - Continue to monitor  - CBC trend  - Monitor coag panel  - Hgb goal > 7.0     MSK / Skin:  #Sternotomy  #Surgical Incision  - Sternal precautions  - Postoperative incision management per protocol  - PT/OT/CR     Prophylaxis:     - Mechanical ppx for DVT  - Chemical DVT ppx: Heparin gtt  - PPI  - Bowel regimen: MiraLAX, senna     Lines / Tubes / Drains:  - RIJ CVC  - Arterial Line  - CTs x4  - Adkins    Disposition:  - CVICU    Patient seen, findings and plan discussed with CVICU staff Dr. Harvey.  Shared visit.  I personally spent 35 minutes caring for this patient.  VERONICA Baker CNP    ====================================    TODAY'S PROGRESS  SUBJECTIVE  Pt resting in bed, endorses pain with activity.      OBJECTIVE  1. VITAL SIGNS  Temp:  [98.1  F (36.7  C)-100  F (37.8  C)] 99.5  F (37.5  C)  Pulse:  [] 98  Resp:  [13-41] 26  MAP:  [65 mmHg-101 mmHg] 65 mmHg  Arterial Line BP: ()/(52-97) 68/59  SpO2:  [83 %-99 %] 98 %  Vent Mode: CPAP/PS  (Continuous positive airway pressure with Pressure Support)  FiO2 (%): 30 %  Resp Rate (Set): 18 breaths/min  Tidal Volume (Set, mL): 480 mL  PEEP (cm H2O): 5 cmH2O  Pressure Support (cm H2O): 7 cmH2O  Resp: 26      2. INTAKE/ OUTPUT  I/O last 3 completed shifts:  In: 3174.08 [P.O.:1280; I.V.:1584.08; NG/GT:60; IV Piggyback:250]  Out: 3232 [Urine:2898; Chest Tube:334]    3. PHYSICAL EXAMINATION    General:Alert and oriented, arouses to voice  Neuro: CHIANG, BELGICA  Resp: diminished breath sounds  CV: sinus rhythm, afib intermittent  Abdomen: Soft, non-distended, non-tender  Incisions: c/d/i  Extremities: +1-2 pulses  CT: To suction, serosang output, no airleak      4. INVESTIGATIONS  Arterial Blood Gases   Recent Labs   Lab 03/24/23  1629 03/24/23  0618 03/24/23  0356 03/23/23  2354   PH 7.42 7.46* 7.45 7.44   PCO2 42 38 39 40   PO2 95 85 89 137*   HCO3 27 27 27 27     Complete Blood Count   Recent Labs   Lab 03/25/23  1229  03/25/23  0350 03/24/23 2006 03/24/23  0959   WBC 24.1* 22.7* 23.3* 17.4*   HGB 12.5* 11.9* 12.7* 12.3*    144* 166 166     Basic Metabolic Panel  Recent Labs   Lab 03/25/23  1229 03/25/23  1228 03/25/23  1012 03/25/23  0914 03/25/23  0356 03/25/23  0350 03/24/23 2012 03/24/23 2006 03/24/23  1543 03/24/23  1537 03/24/23  1154 03/24/23  1128   *  --   --   --   --  129*  --  128*  --   --   --  137  137   POTASSIUM 3.9  --   --   --   --  4.4  --  4.5  --  4.7  --  4.7  4.7   CHLORIDE 90*  --   --   --   --  92*  --  92*  --   --   --  99  99   CO2 27  --   --   --   --  27  --  23  --   --   --  26  26   BUN 23.3*  --   --   --   --  25.2*  --  27.2*  --   --   --  24.5*  24.5*   CR 0.81  --   --   --   --  1.01  --  1.14  --   --   --  1.20*  1.20*   * 133* 118* 336*   < > 117*   < > 211*   < >  --    < > 136*  136*    < > = values in this interval not displayed.     Liver Function Tests  Recent Labs   Lab 03/25/23  1229 03/25/23  0350 03/24/23 2006 03/24/23  1537 03/24/23  1128 03/24/23  0355 03/23/23  1949 03/23/23  1340   * 211* 237*  --  197* 159*   < > 53*   ALT 38 36 36  --  27 25   < > 16   ALKPHOS 84 75 80  --  69 70   < > 65   BILITOTAL 4.8* 4.0* 4.5*  --  3.3* 3.3*   < > 2.6*   ALBUMIN 3.2* 2.9* 3.1*  --  2.9* 2.9*   < > 3.0*   INR  --  1.31*  --  1.41*  --  1.39*  --  1.42*    < > = values in this interval not displayed.     Pancreatic Enzymes  No lab results found in last 7 days.  Coagulation Profile  Recent Labs   Lab 03/25/23  0350 03/24/23  1537 03/24/23  0355 03/23/23  1340 03/23/23  1123 03/20/23  1420   INR 1.31* 1.41* 1.39* 1.42* 1.92* 1.30*   PTT  --  39*  --  34 31 34     Lactate  Invalid input(s): LACTATE    5. RADIOLOGY  Recent Results (from the past 24 hour(s))   Cardiac Device Check - Inpatient   Result Value    Date Time Interrogation Session [id number]    Implantable Pulse Generator  [employer]    Implantable Pulse Generator  Model D150 [facility]    Implantable Pulse Generator Serial Number [id number]    Type Interrogation Session In Clinic    Clinic Name [facility]    Implantable Pulse Generator Type Defibrillator    Implantable Pulse Generator Implant Date 20170608    Implantable Lead  [employer]    Implantable Lead Model 0293 [place] 4-Site SG    Implantable Lead Serial Number [id number]    Implantable Lead Implant Date 20170608    Implantable Lead Polarity Type Bipolar Lead    Implantable Lead Location Detail 1 UNKNOWN    Implantable Lead Location Right Ventricle    Jesus Setting Mode (NBG Code) VVI    Jesus Setting Lower Rate Limit 40    Lead Channel Setting Sensing Polarity Bipolar    Lead Channel Setting Sensing Sensitivity 0.6    Lead Channel Setting Sensing Adaptation Mode Adaptive    Lead Channel Setting Pacing Polarity Bipolar    Lead Channel Setting Pacing Pulse Width 0.4    Lead Channel Setting Pacing Amplitude 2.0    Zone Setting Type Category VF    Zone Setting Vendor Type Category VF    Zone Setting Type Category VT    Zone Setting Vendor Type Category VT    Lead Channel Impedance Value 414    Lead Channel Pacing Threshold Amplitude 1.0    Lead Channel Pacing Threshold Pulse Width 0.4    Battery Date Time of Measurements 20230323075100    Battery Status Beginning of Service    Battery Remaining Longevity 120    Battery Remaining Percentage 100    Capacitor Charge Type Reformation    Capacitor Last Charge Date Time 20221112043400    Capacitor Charge Time 10.4    Jesus Statistic Date Time Start 20210128000000    Jesus Statistic Date Time End 20230323000000    Jesus Statistic RV Percent Paced 0    Therapy Statistic Recent Shocks Delivered 0    Therapy Statistic Recent Shocks Aborted 0    Therapy Statistic Recent ATP Delivered 1    Therapy Statistic Recent Date Time Start 20210128000000    Therapy Statistic Recent Date Time End 20230323000000    Therapy Statistic Total Shocks  Delivered 0    Therapy Statistic Total Shocks Aborted 0    Therapy Statistic Total ATP Delivered 1    Therapy Statistic Total  Date Time Start 20170608000000    Therapy Statistic Total  Date Time End 20230323000000    Episode Statistic Recent Count 13    Episode Statistic Type Category Other    Episode Statistic Recent Count 4    Episode Statistic Type Category VT    Episode Statistic Vendor Type Category NSVT    Episode Statistic Recent Count 0    Episode Statistic Type Category VF    Episode Statistic Vendor Type Category VF    Episode Statistic Recent Count 1    Episode Statistic Type Category VT    Episode Statistic Vendor Type Category VT    Episode Statistic Recent Count 0    Episode Statistic Type Category VT    Episode Statistic Vendor Type Category VT-1    Episode Statistic Recent Date Time Start 20210128000000    Episode Statistic Recent Date Time End 20230323000000    Episode Statistic Recent Date Time Start 20210128000000    Episode Statistic Recent Date Time End 20230323000000    Episode Statistic Recent Date Time Start 20210128000000    Episode Statistic Recent Date Time End 20230323000000    Episode Statistic Recent Date Time Start 20210128000000    Episode Statistic Recent Date Time End 20230323000000    Episode Statistic Recent Date Time Start 20210128000000    Episode Statistic Recent Date Time End 20230323000000    Narrative    Patient seen in [facility] OR [facility] for evaluation and iterative programming of   their ICD per MD orders. PRE-OP LVAD PROGRAMMING.     Device: [employer] D150 DYNAGEN  Normal device function.   Mode: VVI 40 bpm  : <1%  Intrinsic Rhythm:  bpm  Lead Trends Appear Stable.   Estimated battery longevity to RRT = 10 years.  Ventricular Arrhythmia: over the last 2 days 1 VT episode at 183 bpm was   successfully converted with 1 ATP sequence. 1 NSVT lasting 4 sec at 181   bpm.  Setting Changes: ICD Tachy Therapies turned OFF per MD orders.    Plan: Page ICD RN for post-op  programming  ABRAHAM Washington RN    Single lead ICD    I have reviewed and interpreted the device interrogation, settings,   programming and nurse's summary. The device is functioning within normal   device parameters. I agree with the current findings, assessment and plan.   XR Chest Port 1 View    Narrative    Exam: XR CHEST PORT 1 VIEW, 3/23/2023 2:52 PM    Comparison: Radiograph same day, 3/21/2023, 3/19/2023    History: Post Op CVTS Surgery    Findings:  Portable AP view of the chest. Endotracheal tube tip projects over the  low thoracic trachea. IABP marker projects 2.5 cm above the etienne.  New postsurgical changes of the chest. Median sternotomy wires and  mediastinal clips. Mediastinal drains. Right IJ [place]-Jalen catheter tip  projects in stable position over the distal right main pulmonary  artery. Partially visualized LVAD. Left chest wall cardiac device  leads is not fully visualized. Unknown linear density projecting over  the mid to low medial right hemithorax may be external. The left  costophrenic angle is not included in the field-of-view.  Cardiomediastinal silhouette is within normal limits. Mild perihilar  and dense retrocardiac opacities. No appreciable right pleural  effusion. No appreciable pneumothorax. Small amount of subcutaneous  emphysema in the lower neck.      Impression    Impression:  1. Endotracheal tube tip projects over the lower thoracic trachea.  Additional support devices as noted above.  2. New postsurgical changes of the chest with partially visualized  LVAD.   3. Unknown linear density projects over the mid to lower medial right  hemithorax, may be a external to the patient  4. No appreciable pneumothorax.  5. Mild perihilar and dense retrocardiac opacities, likely  atelectasis.    I have personally reviewed the examination and initial interpretation  and I agree with the findings.    CAITLIN RAO MD         SYSTEM ID:  [id number]   XR Abdomen Port 1 View    Narrative    EXAM: XR  ABDOMEN PORT 1 VIEW  3/23/2023 2:53 PM      HISTORY: OG placement    COMPARISON: Same-day chest x-ray, CAP 3/17/2023    FINDINGS: Single AP view of the abdomen. Midline sternotomy wires.  Mediastinal drains. LVAD. Partially visualized left chest tube.  Enteric sidehole projects over the gastroesophageal junction.  Partially visualized [place]-Jalen catheter.    Nonobstructive bowel gas pattern. No pneumatosis. Please see same day  chest x-ray for further thoracic findings. No acute osseous  abnormality.      Impression    IMPRESSION: Enteric tube sidehole projects over the gastroesophageal  junction. Consider advancing 5 cm.    I have personally reviewed the examination and initial interpretation  and I agree with the findings.    HARDEEP FERNANDEZ MD         SYSTEM ID:  [id number]   Cardiac Device Check - Inpatient   Result Value    Date Time Interrogation Session [id number]    Implantable Pulse Generator  [employer]    Implantable Pulse Generator Model D150 DYNAGEN    Implantable Pulse Generator Serial Number [id number]    Type Interrogation Session In Clinic    Clinic Name [facility]    Implantable Pulse Generator Type Defibrillator    Implantable Pulse Generator Implant Date 20170608    Implantable Lead  [employer]    Implantable Lead Model 0293 [employer] 4-Site SG    Implantable Lead Serial Number [id number]    Implantable Lead Implant Date 20170608    Implantable Lead Polarity Type Bipolar Lead    Implantable Lead Location Detail 1 UNKNOWN    Implantable Lead Location Right Ventricle    Jesus Setting Mode (NBG Code) VVI    Jesus Setting Lower Rate Limit 40    Lead Channel Setting Sensing Polarity Bipolar    Lead Channel Setting Sensing Sensitivity 0.6    Lead Channel Setting Sensing Adaptation Mode Adaptive    Lead Channel Setting Pacing Polarity Bipolar    Lead Channel Setting Pacing Pulse Width 0.4    Lead Channel Setting Pacing Amplitude 2.0     Zone Setting Type Category VF    Zone Setting Vendor Type Category VF    Zone Setting Detection Interval 300    Zone Setting Type Category VT    Zone Setting Vendor Type Category VT    Zone Setting Detection Interval 353    Lead Channel Impedance Value 410    Lead Channel Pacing Threshold Amplitude 1.0    Lead Channel Pacing Threshold Pulse Width 0.4    Battery Date Time of Measurements 20230323152400    Battery Status Beginning of Service    Battery Remaining Longevity 120    Battery Remaining Percentage 100    Capacitor Charge Type Reformation    Capacitor Last Charge Date Time 20221112043400    Capacitor Charge Time 10.4    Jesus Statistic Date Time Start 20230323000000    Jesus Statistic Date Time End 20230323000000    Jesus Statistic RV Percent Paced 0    Therapy Statistic Recent Shocks Delivered 0    Therapy Statistic Recent Shocks Aborted 0    Therapy Statistic Recent ATP Delivered 0    Therapy Statistic Recent Date Time Start 20230323000000    Therapy Statistic Recent Date Time End 20230323000000    Therapy Statistic Total Shocks Delivered 0    Therapy Statistic Total Shocks Aborted 0    Therapy Statistic Total ATP Delivered 1    Therapy Statistic Total  Date Time Start 20170608000000    Therapy Statistic Total  Date Time End 20230323000000    Episode Statistic Recent Count 0    Episode Statistic Type Category Other    Episode Statistic Recent Count 0    Episode Statistic Type Category VT    Episode Statistic Vendor Type Category NSVT    Episode Statistic Recent Count 0    Episode Statistic Type Category VF    Episode Statistic Vendor Type Category VF    Episode Statistic Recent Count 0    Episode Statistic Type Category VT    Episode Statistic Vendor Type Category VT    Episode Statistic Recent Count 0    Episode Statistic Type Category VT    Episode Statistic Vendor Type Category VT-1    Episode Statistic Recent Date Time Start 20230323000000    Episode Statistic Recent Date Time End 20230323000000     Episode Statistic Recent Date Time Start 20230323000000    Episode Statistic Recent Date Time End 20230323000000    Episode Statistic Recent Date Time Start 20230323000000    Episode Statistic Recent Date Time End 20230323000000    Episode Statistic Recent Date Time Start 20230323000000    Episode Statistic Recent Date Time End 20230323000000    Episode Statistic Recent Date Time Start 20230323000000    Episode Statistic Recent Date Time End 20230323000000    Narrative    Patient seen in the [facility] CVICU for evaluation and iterative programming of   their ICD per MD orders, post-op LVAD Implant.    Device: [employer] D150 DYNAGEN  Normal device function.   Mode: VVI 40 bpm  Intrinsic Rhythm:  bpm  Estimated battery longevity to RRT = 10 years.    Setting Changes: ICD Tachy Therapies Turned ON.    ABRAHAM Washington RN  Single lead ICD    I have reviewed and interpreted the device interrogation, settings,   programming and nurse's summary. The device is functioning within normal   device parameters. I agree with the current findings, assessment and plan.

## 2023-03-25 NOTE — PLAN OF CARE
Major Shift Events:  Patient hemodynamically stable. Drive line site CDI.Unable to get PAP wave form or obtain blood return. All labs drawn from CVP port. CVP 9-11. Sodium dropped substantially from 137-128; MD advised. Patient drinking lots of water; advised patient to slow down on water consumption. Pain controlled with PRN Oxycodone 10 mg. Adequate urine output > 30 ml/hr. Patient did have multiple short runs of NSVT 6-8 beats- asymptomatic with events.  Plan:   For vital signs and complete assessments, please see documentation flowsheets.

## 2023-03-25 NOTE — PHARMACY-ANTICOAGULATION SERVICE
Clinical Pharmacy - Warfarin Dosing Consult     Pharmacy has been consulted to manage this patient s warfarin therapy.  Indication: LVAD/RVAD  Therapy Goal: INR 2-3  Provider/Team: CVTS/Cards 2  Warfarin Prior to Admission: No  Significant drug interactions: Aspirin, IV heparin (increased risk of bleeding)  Dose Comments: Had bloody OG output 3/24 on IV PPI BID, on dobutamine for RV support    INR   Date Value Ref Range Status   03/25/2023 1.31 (H) 0.85 - 1.15 Final   03/24/2023 1.41 (H) 0.85 - 1.15 Final       Recommend warfarin 3 mg today.  Pharmacy will monitor Akshat Fragoso daily and order warfarin doses to achieve specified goal.      Please contact pharmacy as soon as possible if the warfarin needs to be held for a procedure or if the warfarin goals change.      Delaney Brooks, PharmD  CVICU and Advanced Heart Failure Pharmacist  Pager [phone]

## 2023-03-25 NOTE — PLAN OF CARE
Shift Highlights:  Rhythm change to RVR with HR sustaining in 130s at noon. Rate controlled with amiodarone loading dose and amiodarone at 1 mg/min. Converted back to sinus rhythm with HR low 100s. Amiodarone stopped, dobutamine gtt restarted at 1530 at 2.5 mcg/kg/min.     >2 L urine output after bumex given this morning. CVP from 19 to 9 and MAPs trending to low 60s, 250 ml LR bolus given. Hydralazine held, evening captopril held. MAP improving to 70s.     Heparin gtt now at 1250 units/hr.     See flowsheet for full assessment data.     Goal Outcome Evaluation:    Plan of Care Reviewed With: patient, spouse, child    Overall Patient Progress: improving    Outcome Evaluation: Coral removed. Episode of afib RVR with HRs up to 180s, now resolved. Adkins removed. Up to chair this evening. Pain managed with scheduled tylenol, PRN oxycodone/dilaudid.

## 2023-03-25 NOTE — CONSULTS
Cardiology Progress Note       Changes Today:   -Remove swan kodi catheter given PA wave form not functioning   -Digoxin load ordered for you   -Diuresis given CVP ordered for you    Physical Exam   Temp: 98.2  F (36.8  C) Temp src: Bladder BP: 100/60 Pulse: 108   Resp: 19 SpO2: 99 % O2 Device: Nasal cannula Oxygen Delivery: 4 LPM    Vital Signs with Ranges  Temp:  [97.9  F (36.6  C)-100.8  F (38.2  C)] 98.2  F (36.8  C)  Pulse:  [] 108  Resp:  [13-41] 19  BP: ()/(56-84) 100/60  MAP:  [0 mmHg-110 mmHg] 81 mmHg  Arterial Line BP: (0-111)/(0-108) 87/71  FiO2 (%):  [30 %] 30 %  SpO2:  [92 %-99 %] 99 %    LVAD Settings  Heartmate 3 LEFT VS  Flow (Lpm): 4.1 Lpm  Pulse Index (PI): 4.9 PI  Speed (rpm): 5200 rpm  Power (mari): 3.7 mari  Current Hct settin    Intake/Output    Intake/Output Summary (Last 24 hours) at 3/25/2023 0750  Last data filed at 3/25/2023 0700  Gross per 24 hour   Intake 2681.91 ml   Output 1355 ml   Net 1326.91 ml       Weight  Vitals:    23 0600 23 0400 23 0544 23 0000   Weight: 72.2 kg (159 lb 2.8 oz) 75.3 kg (166 lb 0.1 oz) 73.4 kg (161 lb 13.1 oz) 80.5 kg (177 lb 7.5 oz)    23 0400   Weight: 81.1 kg (178 lb 12.7 oz)       Vent Mode: CPAP/PS  (Continuous positive airway pressure with Pressure Support)  FiO2 (%): 30 %  Resp Rate (Set): 18 breaths/min  Tidal Volume (Set, mL): 480 mL  PEEP (cm H2O): 5 cmH2O  Pressure Support (cm H2O): 7 cmH2O  Resp: 19        DOBUTamine 2.5 mcg/kg/min (23 0600)     heparin 500 Units/hr (23 0600)     BETA BLOCKER NOT PRESCRIBED           acetaminophen  975 mg Oral Q8H HOWIE     aspirin  81 mg Oral or NG Tube Daily     fluconazole  200 mg Intravenous Q24H     gabapentin  100 mg Oral or Feeding Tube TID     hydrALAZINE  25 mg Oral 4x Daily     levofloxacin  500 mg Intravenous Q24H     pantoprazole  40 mg Intravenous BID     polyethylene glycol  17 g Oral Daily     rifampin  600 mg Intravenous Q24H      "senna-docusate  1 tablet Oral BID     sodium chloride (PF)  3 mL Intracatheter Q8H     vancomycin  1,500 mg Intravenous Q12H        , Blood pressure 100/60, pulse 108, temperature 98.2  F (36.8  C), resp. rate 19, height 1.702 m (5' 7\"), weight 81.1 kg (178 lb 12.7 oz), SpO2 99 %.  Constitutional: awake, alert, cooperative, no apparent distress, and appears stated age  Eyes: sclera clear, conjunctiva normal  Respiratory: No increased work of breathing, good air exchange, no wheezing  Cardiovascular: LVAD humm  GI: soft, non-distended, non-tender, no masses palpated  Skin: no bruising or bleeding  Neurologic: Awake, alert, oriented to name, place and time.  Cranial nerves II-XII are grossly intact.       Data   Recent Labs   Lab Test 03/25/23  0356 03/25/23  0350 03/24/23 2012 03/24/23 2006   NA  --  129*  --  128*   POTASSIUM  --  4.4  --  4.5   CHLORIDE  --  92*  --  92*   CO2  --  27  --  23   ANIONGAP  --  10  --  13   * 117*   < > 211*   BUN  --  25.2*  --  27.2*   CR  --  1.01  --  1.14   TONY  --  8.9  --  8.7    < > = values in this interval not displayed.       Lab Results   Component Value Date    WBC 22.7 03/25/2023    WBC 9.8 11/20/2019     Lab Results   Component Value Date    RBC 3.92 03/25/2023    RBC 4.70 11/20/2019     Lab Results   Component Value Date    HGB 11.9 03/25/2023    HGB 14.3 11/20/2019     Lab Results   Component Value Date    HCT 37.3 03/25/2023    HCT 45.3 11/20/2019     No components found for: MCT  Lab Results   Component Value Date    MCV 95 03/25/2023    MCV 96 11/20/2019     Lab Results   Component Value Date    MCH 30.4 03/25/2023    MCH 30.4 11/20/2019     Lab Results   Component Value Date    MCHC 31.9 03/25/2023    MCHC 31.6 11/20/2019     Lab Results   Component Value Date    RDW 15.0 03/25/2023    RDW 13.0 11/20/2019     Lab Results   Component Value Date     03/25/2023     11/20/2019        Liver Function Studies -   Recent Labs   Lab Test " 03/25/23  0350   PROTTOTAL 5.1*   ALBUMIN 2.9*   BILITOTAL 4.0*   ALKPHOS 75   *   ALT 36       Venous Blood Gas  Recent Labs   Lab 03/25/23 0001 03/24/23 2006 03/24/23 1629 03/24/23  1537 03/24/23  1156   PHV 7.33 7.31*  --  7.39 7.39   PCO2V 55* 56*  --  49 50   PO2V 39 39  --  39 36   HCO3V 29* 28  --  30* 30*   SAUNDRA 2.2* 0.7  --  3.8* 4.1*   O2PER 2 2 30 30 30       Arterial Blood Gas  Recent Labs   Lab 03/25/23  0001 03/24/23 2006 03/24/23 1629 03/24/23 1537 03/24/23  0750 03/24/23  0618 03/24/23  0356 03/24/23  0341 03/23/23  2354   PH  --   --  7.42  --   --  7.46* 7.45  --  7.44   PCO2  --   --  42  --   --  38 39  --  40   PO2  --   --  95  --   --  85 89  --  137*   HCO3  --   --  27  --   --  27 27  --  27   O2PER 2 2 30 30   < > 30 30   < > 35    < > = values in this interval not displayed.          Recent Results (from the past 24 hour(s))   XR Chest Port 1 View    Narrative    EXAM: XR CHEST PORT 1 VIEW  3/24/2023 12:58 PM      HISTORY: swan position    COMPARISON: Same-day chest x-ray 3/24/2023 (1:03)    FINDINGS: Portable semiupright AP radiograph of the chest. Right IJ  [place] catheter tip projects over the right pulmonary artery,  slightly retracted compared to prior. Postsurgical changes of the  chest with intact median sternotomy wires. Left chest wall implantable  cardiac defibrillator with lead projecting over the right ventricle.  LVAD in place. The tip of the endotracheal tube projects over the  midthoracic trachea. The NG/OG tube courses inferior to the diaphragm.  Multiple mediastinal drains.    The trachea is midline. The cardiac silhouette is stable.  Atherosclerotic calcification of the aortic arch. The left  costophrenic angle is obscured, however there is no large pleural  effusion. No pneumothorax. Increased retrocardiac opacity. The  visualized upper abdomen is unremarkable.      Impression    IMPRESSION:   1. Right IJ [place] catheter tip projects over the main  pulmonary  artery, slightly retracted compared to prior.  2. Increased retrocardiac opacity, likely atelectasis versus  consolidation.    I have personally reviewed the examination and initial interpretation  and I agree with the findings.    FLOYD SUE MD         SYSTEM ID:  [id number]       Blood culture:  Results for orders placed or performed during the hospital encounter of 03/15/23   Blood Culture Hand, Right    Specimen: Hand, Right; Blood   Result Value Ref Range    Culture No Growth    Blood Culture Line, venous    Specimen: Line, venous; Blood   Result Value Ref Range    Culture No Growth       Urine culture:  No results found for this or any previous visit.    Assessment & Plan    57 year old man with past medical history of HFrEF (EF 10% 5/2022) 2/2 NICM s/p ICD, tobacco use disorder, marijuana use, HTN, lumbar radiculopathy, CKD who presents after being found down at home. Found to have had a prolonged episode of VT with concern for cardiac arrest s/p ROSC in the field now s/p LVAD with HM3 on 03/23/23 and tricuspid annuloplasty.      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 Presented with out of hospital cardiac arrest with VT for 5 min 43 seconds of VT at 199 bpm, with spontaneous conversion to sinus rhythm without defibrillation on device interrogation on 3/ 15/2023. He had  A run of  VT while here, with hypotension on 3/16/18.   - VT secondary prevention ICD in place  - Inotropes: Continue dobutamine at 2.5, ok to wean this PM and follow MV02 to ensure stable  -  Volume status: hypervolemic, currently diuresing  - BB: Hold PTA carvedilol 12.5 mg BID  - ACEi/ARB/ARNI: Captopril 12.5mg TID  - afterload reduction: continue hydralazine as need for MAP goal 65-85  - MRA: hold PTA spironolactone 12.5 mg BID  - SGLT2i: none PTA  - SCD ppx/BiV pacer: ICD, reprogrammed device to provide ATP burst at VT threshold  - Heparin and coumadin when safe from surgical perspective  -Digoxin load followed by digoxin daily     Marco Santoyo MD  Cardiology Fellow  [phone]

## 2023-03-26 ENCOUNTER — APPOINTMENT (OUTPATIENT)
Dept: GENERAL RADIOLOGY | Facility: CLINIC | Age: 58
DRG: 001 | End: 2023-03-26
Attending: INTERNAL MEDICINE
Payer: COMMERCIAL

## 2023-03-26 ENCOUNTER — APPOINTMENT (OUTPATIENT)
Dept: OCCUPATIONAL THERAPY | Facility: CLINIC | Age: 58
DRG: 001 | End: 2023-03-26
Attending: INTERNAL MEDICINE
Payer: COMMERCIAL

## 2023-03-26 ENCOUNTER — HEALTH MAINTENANCE LETTER (OUTPATIENT)
Age: 58
End: 2023-03-26

## 2023-03-26 LAB
ALBUMIN SERPL BCG-MCNC: 2.7 G/DL (ref 3.5–5.2)
ALP SERPL-CCNC: 74 U/L (ref 40–129)
ALP SERPL-CCNC: 78 U/L (ref 40–129)
ALP SERPL-CCNC: 88 U/L (ref 40–129)
ALT SERPL W P-5'-P-CCNC: 30 U/L (ref 10–50)
ALT SERPL W P-5'-P-CCNC: 31 U/L (ref 10–50)
ALT SERPL W P-5'-P-CCNC: 32 U/L (ref 10–50)
ANION GAP SERPL CALCULATED.3IONS-SCNC: 10 MMOL/L (ref 7–15)
ANION GAP SERPL CALCULATED.3IONS-SCNC: 11 MMOL/L (ref 7–15)
ANION GAP SERPL CALCULATED.3IONS-SCNC: 7 MMOL/L (ref 7–15)
AST SERPL W P-5'-P-CCNC: 109 U/L (ref 10–50)
AST SERPL W P-5'-P-CCNC: 84 U/L (ref 10–50)
AST SERPL W P-5'-P-CCNC: 99 U/L (ref 10–50)
BASE EXCESS BLDV CALC-SCNC: 10.3 MMOL/L (ref -7.7–1.9)
BASE EXCESS BLDV CALC-SCNC: 9.4 MMOL/L (ref -7.7–1.9)
BILIRUB SERPL-MCNC: 2.1 MG/DL
BILIRUB SERPL-MCNC: 3.4 MG/DL
BILIRUB SERPL-MCNC: 3.7 MG/DL
BUN SERPL-MCNC: 19.7 MG/DL (ref 6–20)
BUN SERPL-MCNC: 20.5 MG/DL (ref 6–20)
BUN SERPL-MCNC: 21.6 MG/DL (ref 6–20)
CA-I BLD-MCNC: 4.5 MG/DL (ref 4.4–5.2)
CALCIUM SERPL-MCNC: 8.5 MG/DL (ref 8.6–10)
CALCIUM SERPL-MCNC: 8.7 MG/DL (ref 8.6–10)
CALCIUM SERPL-MCNC: 8.7 MG/DL (ref 8.6–10)
CHLORIDE SERPL-SCNC: 87 MMOL/L (ref 98–107)
CHLORIDE SERPL-SCNC: 87 MMOL/L (ref 98–107)
CHLORIDE SERPL-SCNC: 88 MMOL/L (ref 98–107)
CREAT SERPL-MCNC: 0.65 MG/DL (ref 0.67–1.17)
CREAT SERPL-MCNC: 0.82 MG/DL (ref 0.67–1.17)
CREAT SERPL-MCNC: 0.93 MG/DL (ref 0.67–1.17)
DEPRECATED HCO3 PLAS-SCNC: 30 MMOL/L (ref 22–29)
DEPRECATED HCO3 PLAS-SCNC: 30 MMOL/L (ref 22–29)
DEPRECATED HCO3 PLAS-SCNC: 31 MMOL/L (ref 22–29)
ERYTHROCYTE [DISTWIDTH] IN BLOOD BY AUTOMATED COUNT: 14.6 % (ref 10–15)
GFR SERPL CREATININE-BSD FRML MDRD: >90 ML/MIN/1.73M2
GLUCOSE BLDC GLUCOMTR-MCNC: 118 MG/DL (ref 70–99)
GLUCOSE BLDC GLUCOMTR-MCNC: 124 MG/DL (ref 70–99)
GLUCOSE BLDC GLUCOMTR-MCNC: 137 MG/DL (ref 70–99)
GLUCOSE SERPL-MCNC: 108 MG/DL (ref 70–99)
GLUCOSE SERPL-MCNC: 123 MG/DL (ref 70–99)
GLUCOSE SERPL-MCNC: 139 MG/DL (ref 70–99)
HCO3 BLDV-SCNC: 35 MMOL/L (ref 21–28)
HCO3 BLDV-SCNC: 37 MMOL/L (ref 21–28)
HCT VFR BLD AUTO: 34.9 % (ref 40–53)
HGB BLD-MCNC: 11.1 G/DL (ref 13.3–17.7)
INR PPP: 1.31 (ref 0.85–1.15)
MAGNESIUM SERPL-MCNC: 1.7 MG/DL (ref 1.7–2.3)
MAGNESIUM SERPL-MCNC: 3.2 MG/DL (ref 1.7–2.3)
MCH RBC QN AUTO: 30.1 PG (ref 26.5–33)
MCHC RBC AUTO-ENTMCNC: 31.8 G/DL (ref 31.5–36.5)
MCV RBC AUTO: 95 FL (ref 78–100)
O2/TOTAL GAS SETTING VFR VENT: 3 %
O2/TOTAL GAS SETTING VFR VENT: 3 %
OXYHGB MFR BLDV: 69 % (ref 70–75)
OXYHGB MFR BLDV: 76 % (ref 70–75)
PCO2 BLDV: 54 MM HG (ref 40–50)
PCO2 BLDV: 57 MM HG (ref 40–50)
PH BLDV: 7.42 [PH] (ref 7.32–7.43)
PH BLDV: 7.43 [PH] (ref 7.32–7.43)
PHOSPHATE SERPL-MCNC: 2.4 MG/DL (ref 2.5–4.5)
PHOSPHATE SERPL-MCNC: 2.7 MG/DL (ref 2.5–4.5)
PLATELET # BLD AUTO: 146 10E3/UL (ref 150–450)
PO2 BLDV: 38 MM HG (ref 25–47)
PO2 BLDV: 43 MM HG (ref 25–47)
POTASSIUM SERPL-SCNC: 3.6 MMOL/L (ref 3.4–5.3)
POTASSIUM SERPL-SCNC: 3.7 MMOL/L (ref 3.4–5.3)
POTASSIUM SERPL-SCNC: 3.9 MMOL/L (ref 3.4–5.3)
POTASSIUM SERPL-SCNC: 4 MMOL/L (ref 3.4–5.3)
PROT SERPL-MCNC: 4.9 G/DL (ref 6.4–8.3)
PROT SERPL-MCNC: 5 G/DL (ref 6.4–8.3)
PROT SERPL-MCNC: 5.3 G/DL (ref 6.4–8.3)
RBC # BLD AUTO: 3.69 10E6/UL (ref 4.4–5.9)
SODIUM SERPL-SCNC: 124 MMOL/L (ref 136–145)
SODIUM SERPL-SCNC: 128 MMOL/L (ref 136–145)
SODIUM SERPL-SCNC: 129 MMOL/L (ref 136–145)
SODIUM SERPL-SCNC: 129 MMOL/L (ref 136–145)
SODIUM SERPL-SCNC: 130 MMOL/L (ref 136–145)
UFH PPP CHRO-ACNC: 0.22 IU/ML
UFH PPP CHRO-ACNC: 0.29 IU/ML
UFH PPP CHRO-ACNC: 0.32 IU/ML
UFH PPP CHRO-ACNC: 0.34 IU/ML
WBC # BLD AUTO: 20.3 10E3/UL (ref 4–11)

## 2023-03-26 PROCEDURE — 84100 ASSAY OF PHOSPHORUS: CPT | Performed by: THORACIC SURGERY (CARDIOTHORACIC VASCULAR SURGERY)

## 2023-03-26 PROCEDURE — 250N000013 HC RX MED GY IP 250 OP 250 PS 637: Performed by: STUDENT IN AN ORGANIZED HEALTH CARE EDUCATION/TRAINING PROGRAM

## 2023-03-26 PROCEDURE — 250N000011 HC RX IP 250 OP 636: Performed by: INTERNAL MEDICINE

## 2023-03-26 PROCEDURE — 71045 X-RAY EXAM CHEST 1 VIEW: CPT | Mod: 26 | Performed by: RADIOLOGY

## 2023-03-26 PROCEDURE — 82330 ASSAY OF CALCIUM: CPT | Performed by: STUDENT IN AN ORGANIZED HEALTH CARE EDUCATION/TRAINING PROGRAM

## 2023-03-26 PROCEDURE — 85520 HEPARIN ASSAY: CPT | Performed by: INTERNAL MEDICINE

## 2023-03-26 PROCEDURE — 84132 ASSAY OF SERUM POTASSIUM: CPT | Performed by: STUDENT IN AN ORGANIZED HEALTH CARE EDUCATION/TRAINING PROGRAM

## 2023-03-26 PROCEDURE — 99291 CRITICAL CARE FIRST HOUR: CPT | Mod: 25 | Performed by: INTERNAL MEDICINE

## 2023-03-26 PROCEDURE — 36415 COLL VENOUS BLD VENIPUNCTURE: CPT | Performed by: STUDENT IN AN ORGANIZED HEALTH CARE EDUCATION/TRAINING PROGRAM

## 2023-03-26 PROCEDURE — 97535 SELF CARE MNGMENT TRAINING: CPT | Mod: GO | Performed by: OCCUPATIONAL THERAPIST

## 2023-03-26 PROCEDURE — 84100 ASSAY OF PHOSPHORUS: CPT | Performed by: INTERNAL MEDICINE

## 2023-03-26 PROCEDURE — 97168 OT RE-EVAL EST PLAN CARE: CPT | Mod: GO | Performed by: OCCUPATIONAL THERAPIST

## 2023-03-26 PROCEDURE — C9113 INJ PANTOPRAZOLE SODIUM, VIA: HCPCS | Performed by: INTERNAL MEDICINE

## 2023-03-26 PROCEDURE — 250N000013 HC RX MED GY IP 250 OP 250 PS 637: Performed by: INTERNAL MEDICINE

## 2023-03-26 PROCEDURE — 250N000011 HC RX IP 250 OP 636: Performed by: STUDENT IN AN ORGANIZED HEALTH CARE EDUCATION/TRAINING PROGRAM

## 2023-03-26 PROCEDURE — 999N000128 HC STATISTIC PERIPHERAL IV START W/O US GUIDANCE

## 2023-03-26 PROCEDURE — 83735 ASSAY OF MAGNESIUM: CPT | Performed by: THORACIC SURGERY (CARDIOTHORACIC VASCULAR SURGERY)

## 2023-03-26 PROCEDURE — 120N000003 HC R&B IMCU UMMC

## 2023-03-26 PROCEDURE — 85027 COMPLETE CBC AUTOMATED: CPT | Performed by: THORACIC SURGERY (CARDIOTHORACIC VASCULAR SURGERY)

## 2023-03-26 PROCEDURE — 84295 ASSAY OF SERUM SODIUM: CPT | Performed by: NURSE PRACTITIONER

## 2023-03-26 PROCEDURE — 258N000003 HC RX IP 258 OP 636: Performed by: INTERNAL MEDICINE

## 2023-03-26 PROCEDURE — 250N000011 HC RX IP 250 OP 636: Performed by: THORACIC SURGERY (CARDIOTHORACIC VASCULAR SURGERY)

## 2023-03-26 PROCEDURE — 82805 BLOOD GASES W/O2 SATURATION: CPT | Performed by: STUDENT IN AN ORGANIZED HEALTH CARE EDUCATION/TRAINING PROGRAM

## 2023-03-26 PROCEDURE — 85610 PROTHROMBIN TIME: CPT | Performed by: STUDENT IN AN ORGANIZED HEALTH CARE EDUCATION/TRAINING PROGRAM

## 2023-03-26 PROCEDURE — 250N000009 HC RX 250: Performed by: INTERNAL MEDICINE

## 2023-03-26 PROCEDURE — 97110 THERAPEUTIC EXERCISES: CPT | Mod: GO | Performed by: OCCUPATIONAL THERAPIST

## 2023-03-26 PROCEDURE — 93010 ELECTROCARDIOGRAM REPORT: CPT | Performed by: INTERNAL MEDICINE

## 2023-03-26 PROCEDURE — 93750 INTERROGATION VAD IN PERSON: CPT | Performed by: INTERNAL MEDICINE

## 2023-03-26 PROCEDURE — 93005 ELECTROCARDIOGRAM TRACING: CPT

## 2023-03-26 PROCEDURE — 83735 ASSAY OF MAGNESIUM: CPT | Performed by: INTERNAL MEDICINE

## 2023-03-26 PROCEDURE — 85520 HEPARIN ASSAY: CPT | Performed by: THORACIC SURGERY (CARDIOTHORACIC VASCULAR SURGERY)

## 2023-03-26 PROCEDURE — 80053 COMPREHEN METABOLIC PANEL: CPT | Performed by: STUDENT IN AN ORGANIZED HEALTH CARE EDUCATION/TRAINING PROGRAM

## 2023-03-26 PROCEDURE — 99232 SBSQ HOSP IP/OBS MODERATE 35: CPT | Mod: 24 | Performed by: ANESTHESIOLOGY

## 2023-03-26 PROCEDURE — 71045 X-RAY EXAM CHEST 1 VIEW: CPT

## 2023-03-26 RX ORDER — BUMETANIDE 0.25 MG/ML
2 INJECTION INTRAMUSCULAR; INTRAVENOUS 2 TIMES DAILY
Status: DISCONTINUED | OUTPATIENT
Start: 2023-03-26 | End: 2023-03-26

## 2023-03-26 RX ORDER — POTASSIUM CHLORIDE 29.8 MG/ML
20 INJECTION INTRAVENOUS ONCE
Status: COMPLETED | OUTPATIENT
Start: 2023-03-26 | End: 2023-03-26

## 2023-03-26 RX ORDER — MAGNESIUM SULFATE HEPTAHYDRATE 40 MG/ML
2 INJECTION, SOLUTION INTRAVENOUS ONCE
Status: COMPLETED | OUTPATIENT
Start: 2023-03-26 | End: 2023-03-26

## 2023-03-26 RX ORDER — AMOXICILLIN 250 MG
2 CAPSULE ORAL 2 TIMES DAILY
Status: DISCONTINUED | OUTPATIENT
Start: 2023-03-26 | End: 2023-04-09 | Stop reason: HOSPADM

## 2023-03-26 RX ORDER — BUMETANIDE 0.25 MG/ML
2 INJECTION INTRAMUSCULAR; INTRAVENOUS 2 TIMES DAILY
Status: COMPLETED | OUTPATIENT
Start: 2023-03-26 | End: 2023-03-26

## 2023-03-26 RX ORDER — POLYETHYLENE GLYCOL 3350 17 G/17G
17 POWDER, FOR SOLUTION ORAL 2 TIMES DAILY
Status: DISCONTINUED | OUTPATIENT
Start: 2023-03-26 | End: 2023-04-09 | Stop reason: HOSPADM

## 2023-03-26 RX ORDER — PANTOPRAZOLE SODIUM 40 MG/1
40 TABLET, DELAYED RELEASE ORAL
Status: DISCONTINUED | OUTPATIENT
Start: 2023-03-27 | End: 2023-04-09 | Stop reason: HOSPADM

## 2023-03-26 RX ORDER — WARFARIN SODIUM 3 MG/1
3 TABLET ORAL
Status: COMPLETED | OUTPATIENT
Start: 2023-03-26 | End: 2023-03-26

## 2023-03-26 RX ADMIN — MAGNESIUM SULFATE IN WATER 2 G: 40 INJECTION, SOLUTION INTRAVENOUS at 06:44

## 2023-03-26 RX ADMIN — DIGOXIN 250 MCG: 125 TABLET ORAL at 08:22

## 2023-03-26 RX ADMIN — WARFARIN SODIUM 3 MG: 3 TABLET ORAL at 19:40

## 2023-03-26 RX ADMIN — OXYCODONE HYDROCHLORIDE 10 MG: 10 TABLET ORAL at 06:13

## 2023-03-26 RX ADMIN — OXYCODONE HYDROCHLORIDE 10 MG: 10 TABLET ORAL at 19:28

## 2023-03-26 RX ADMIN — BUMETANIDE 2 MG: 0.25 INJECTION INTRAMUSCULAR; INTRAVENOUS at 12:07

## 2023-03-26 RX ADMIN — SENNOSIDES AND DOCUSATE SODIUM 1 TABLET: 8.6; 5 TABLET ORAL at 08:22

## 2023-03-26 RX ADMIN — OXYCODONE HYDROCHLORIDE 10 MG: 10 TABLET ORAL at 01:54

## 2023-03-26 RX ADMIN — POTASSIUM PHOSPHATE, MONOBASIC AND POTASSIUM PHOSPHATE, DIBASIC 15 MMOL: 224; 236 INJECTION, SOLUTION, CONCENTRATE INTRAVENOUS at 11:52

## 2023-03-26 RX ADMIN — ACETAMINOPHEN 975 MG: 325 TABLET, FILM COATED ORAL at 06:13

## 2023-03-26 RX ADMIN — ACETAMINOPHEN 975 MG: 325 TABLET, FILM COATED ORAL at 15:10

## 2023-03-26 RX ADMIN — LIDOCAINE PATCH 4% 1 PATCH: 40 PATCH TOPICAL at 08:24

## 2023-03-26 RX ADMIN — POTASSIUM CHLORIDE 20 MEQ: 29.8 INJECTION, SOLUTION INTRAVENOUS at 05:28

## 2023-03-26 RX ADMIN — HYDROMORPHONE HYDROCHLORIDE 0.4 MG: 0.2 INJECTION, SOLUTION INTRAMUSCULAR; INTRAVENOUS; SUBCUTANEOUS at 21:57

## 2023-03-26 RX ADMIN — BUMETANIDE 2 MG: 0.25 INJECTION INTRAMUSCULAR; INTRAVENOUS at 19:40

## 2023-03-26 RX ADMIN — GABAPENTIN 300 MG: 300 CAPSULE ORAL at 19:37

## 2023-03-26 RX ADMIN — OXYCODONE HYDROCHLORIDE 10 MG: 10 TABLET ORAL at 15:10

## 2023-03-26 RX ADMIN — OXYCODONE HYDROCHLORIDE 10 MG: 10 TABLET ORAL at 10:01

## 2023-03-26 RX ADMIN — Medication 6.25 MG: at 15:10

## 2023-03-26 RX ADMIN — ACETAMINOPHEN 975 MG: 325 TABLET, FILM COATED ORAL at 21:16

## 2023-03-26 RX ADMIN — GABAPENTIN 300 MG: 300 CAPSULE ORAL at 15:10

## 2023-03-26 RX ADMIN — GABAPENTIN 300 MG: 300 CAPSULE ORAL at 08:22

## 2023-03-26 RX ADMIN — PANTOPRAZOLE SODIUM 40 MG: 40 INJECTION, POWDER, FOR SOLUTION INTRAVENOUS at 08:24

## 2023-03-26 RX ADMIN — OXYCODONE HYDROCHLORIDE 10 MG: 10 TABLET ORAL at 23:56

## 2023-03-26 RX ADMIN — SENNOSIDES AND DOCUSATE SODIUM 2 TABLET: 8.6; 5 TABLET ORAL at 19:37

## 2023-03-26 RX ADMIN — HEPARIN SODIUM 1400 UNITS/HR: 10000 INJECTION, SOLUTION INTRAVENOUS at 10:02

## 2023-03-26 RX ADMIN — HYDROMORPHONE HYDROCHLORIDE 0.4 MG: 0.2 INJECTION, SOLUTION INTRAMUSCULAR; INTRAVENOUS; SUBCUTANEOUS at 07:57

## 2023-03-26 RX ADMIN — POLYETHYLENE GLYCOL 3350 17 G: 17 POWDER, FOR SOLUTION ORAL at 08:25

## 2023-03-26 RX ADMIN — ASPIRIN 81 MG 81 MG: 81 TABLET ORAL at 08:22

## 2023-03-26 ASSESSMENT — ACTIVITIES OF DAILY LIVING (ADL)
ADLS_ACUITY_SCORE: 26
ADLS_ACUITY_SCORE: 32
ADLS_ACUITY_SCORE: 26
ADLS_ACUITY_SCORE: 32
ADLS_ACUITY_SCORE: 32
ADLS_ACUITY_SCORE: 26
ADLS_ACUITY_SCORE: 32
ADLS_ACUITY_SCORE: 26

## 2023-03-26 NOTE — PROGRESS NOTES
CV ICU PROGRESS NOTE  March 26, 2023   Admitted: 3/15/2023  8:01 PM    ASSESSMENT: 57 year old male with PMH of HFrEF (10%) secondary to NICM (suspected viral etiology) s/p ICD, chronic tobacco/marijuana use, COPD, HTN, CKD stage III, who presented to [facility] on 3/15 after being found down from a VT arrest. CPR was performed by family. Spontaneously converted to NSR without defibrillation. Down time suspected to be ~ 6 minutes. Presents to [facility] for LVAD placement with preop IABP by Dr. Matias on 3/23/23. He arrives to the CVICU intubated and sedated on propofol gtt s/p LVAD (Heartmate III) placement, TVR, and PFO repair with Dr. Matias and Dr. Waller 3/23/22.    CO-MORBIDITIES:       Patient Active Problem List   Diagnosis    CHF (congestive heart failure) (H)    Cardiomyopathy, nonischemic (H)    Personal history of tobacco use, presenting hazards to health    ICD (implantable cardioverter-defibrillator) in place- Xylo, single chamber- NOT dependent    Chronic systolic heart failure (H)    JOHNSON (dyspnea on exertion)    Acute on chronic systolic congestive heart failure (H)    Hypertrophic nonobstructive cardiomyopathy (H)    Other ill-defined heart diseases    Erectile dysfunction, unspecified erectile dysfunction type    COPD, severe (H)    Ventricular tachycardia      Overnight and today's changes:  Stopped dobutamine  Capropril with hold parameters.   Bumex 2mg BID today  Transfer orders  Urinary retention - muro replaced  Lines out  Sodium level every 6 hours    PLAN:  Neuro/ pain/ sedation:  - Monitor neurological status. Notify the MD for any acute changes in exam.    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 Left ventricular function is severely reduced. The ejection fraction is 5-10%. Global right ventricular function is normal. Moderate tricuspid insufficiency. Estimated pulmonary artery systolic pressure is 46 mmHg. Dilation of the inferior vena cava is present with abnormal respiratory variation in diameter.    -dobutamine for inotropic and support, wean off  - Monitor hemodynamic status.   - Goal MAP >65  - SCD ppx/BiV pacer: ICD, reprogrammed device to provide ATP burst at VT threshold.  - cardiology consult and management appreciated  - captopril started, digoxin  - diuresis with net negative  - warfarin initiated  - CT to come out tomorrow per CVTS    GI care / Nutrition:   # Elevated LFT, stable  # risk of malnutrition  - Nutrition consulted, appreciate recommendations  - PPI   - Bowel regimen: MiraLAX, Senna  - advanced diet, 2 liter water restriction     Renal / Fluids / Electrolytes:   #CKDIII  # hyponatremia  Cr 1.2 from 1.1  - Strict I/O, daily weights  - Avoid/limit nephrotoxins as able  - Replete lytes PRN per protocol.      Endocrine:    #Stress hyperglycemia  - Goal BG <180 for optimal healing  - MSSI PRN     ID / Antibiotics:  #Stress induced leukocytosis  - No s/sx infection  - completed perioperative regimen  - Continue to monitor fever curve, WBC, and inflammatory markers as appropriate     Heme:     # Acute blood loss anemia  # Anemia of chronic disease   No s/sx active bleeding  - Continue to monitor  - CBC trend  - Monitor coag panel  - Hgb goal > 7.0     MSK / Skin:  #Sternotomy  #Surgical Incision  - Sternal precautions  - Postoperative incision management per protocol  - PT/OT/CR     Prophylaxis:     -  DVT ppx: Heparin gtt  - PPI  - Bowel regimen: MiraLAX, senna     Lines / Tubes / Drains:  - CTs x4  - Adkins    Disposition:  - CVICU    Patient seen, findings and plan discussed with CVICU staff Dr. Tucker.  Shared visit.  I personally spent 35 minutes caring for this patient.  VERONICA Baker CNP    ====================================    TODAY'S PROGRESS  SUBJECTIVE  Pt resting in bed, alert and pleasant.  Tells me overall he is doing well except he has pain with coughing.     OBJECTIVE  1. VITAL SIGNS  Temp:  [97.8  F (36.6  C)-98.8  F (37.1  C)] 98.1  F (36.7  C)  Pulse:  [] 104  Resp:  [12-35] 16  BP: ()/(47-80) 116/80  MAP:  [59 mmHg-88 mmHg] 73 mmHg  Arterial Line BP: (65-92)/(52-84) 75/69  SpO2:  [94 %-99 %] 96 %  Resp: 16      2. INTAKE/ OUTPUT  I/O last 3 completed shifts:  In: 1958.78 [P.O.:960; I.V.:998.78]  Out: 2780 [Urine:2500; Chest Tube:280]    3. PHYSICAL EXAMINATION    General:Alert and oriented  Neuro: CHIANG, BELGICA  Resp: diminished breath sounds  CV: sinus rhythm, afib intermittent  Abdomen: Soft, non-distended, non-tender  Incisions: c/d/i  Extremities: +1-2 pulses  CT: To suction, serosang output, no airleak      4. INVESTIGATIONS  Arterial Blood Gases   Recent Labs   Lab 03/24/23  1629 03/24/23  0618 03/24/23  0356 03/23/23  2354   PH 7.42 7.46* 7.45 7.44   PCO2 42 38 39 40   PO2 95 85 89 137*   HCO3 27 27 27 27     Complete Blood Count   Recent Labs   Lab 03/26/23  0400 03/25/23  1229 03/25/23  0350 03/24/23 2006   WBC 20.3* 24.1* 22.7* 23.3*   HGB 11.1* 12.5* 11.9* 12.7*   * 162 144* 166     Basic Metabolic Panel  Recent Labs   Lab 03/26/23  1517 03/26/23  1140 03/26/23  1025 03/26/23  0817 03/26/23  0400 03/25/23  2157 03/25/23  2047 03/25/23  1724 03/25/23  1229   NA  --   --  124*  --  128*  --  128*  --  129*   POTASSIUM 4.0  --  3.9  --  3.6  --  4.0  --  3.9   CHLORIDE  --   --  87*  --  88*  --  88*  --  90*   CO2  --   --  30*  --  30*  --  28  --  27   BUN  --    --  19.7  --  20.5*  --  21.9*  --  23.3*   CR  --   --  0.82  --  0.65*  --  0.69  --  0.81   GLC  --  124* 139* 118* 123*   < > 159*   < > 135*    < > = values in this interval not displayed.     Liver Function Tests  Recent Labs   Lab 03/26/23  1025 03/26/23  0521 03/26/23  0400 03/25/23  2047 03/25/23  1229 03/25/23  0350 03/24/23 2006 03/24/23  1537 03/24/23  1128 03/24/23  0355   AST 99*  --  109* 152* 197* 211*   < >  --    < > 159*   ALT 31  --  32 38 38 36   < >  --    < > 25   ALKPHOS 78  --  74 82 84 75   < >  --    < > 70   BILITOTAL 3.4*  --  3.7* 4.4* 4.8* 4.0*   < >  --    < > 3.3*   ALBUMIN 2.7*  --  2.7* 3.0* 3.2* 2.9*   < >  --    < > 2.9*   INR  --  1.31*  --   --   --  1.31*  --  1.41*  --  1.39*    < > = values in this interval not displayed.     Pancreatic Enzymes  No lab results found in last 7 days.  Coagulation Profile  Recent Labs   Lab 03/26/23  0521 03/25/23  0350 03/24/23  1537 03/24/23  0355 03/23/23  1340 03/23/23  1123 03/20/23  1420   INR 1.31* 1.31* 1.41* 1.39* 1.42* 1.92* 1.30*   PTT  --   --  39*  --  34 31 34     Lactate  Invalid input(s): LACTATE    5. RADIOLOGY  Recent Results (from the past 24 hour(s))   Cardiac Device Check - Inpatient   Result Value    Date Time Interrogation Session [id number]    Implantable Pulse Generator  [employer]    Implantable Pulse Generator Model D150 Smart[employer]    Implantable Pulse Generator Serial Number [id number]    Type Interrogation Session In Clinic    Clinic Name [facility]    Implantable Pulse Generator Type Defibrillator    Implantable Pulse Generator Implant Date 20170608    Implantable Lead  [employer]    Implantable Lead Model 0293 [employer] Sophia 4-Site SG    Implantable Lead Serial Number [id number]    Implantable Lead Implant Date 20170608    Implantable Lead Polarity Type Bipolar Lead    Implantable Lead Location Detail 1 UNKNOWN    Implantable Lead Location Right Ventricle     Jesus Setting Mode (NBG Code) VVI    Jesus Setting Lower Rate Limit 40    Lead Channel Setting Sensing Polarity Bipolar    Lead Channel Setting Sensing Sensitivity 0.6    Lead Channel Setting Sensing Adaptation Mode Adaptive    Lead Channel Setting Pacing Polarity Bipolar    Lead Channel Setting Pacing Pulse Width 0.4    Lead Channel Setting Pacing Amplitude 2.0    Zone Setting Type Category VF    Zone Setting Vendor Type Category VF    Zone Setting Type Category VT    Zone Setting Vendor Type Category VT    Lead Channel Impedance Value 414    Lead Channel Pacing Threshold Amplitude 1.0    Lead Channel Pacing Threshold Pulse Width 0.4    Battery Date Time of Measurements 20230323075100    Battery Status Beginning of Service    Battery Remaining Longevity 120    Battery Remaining Percentage 100    Capacitor Charge Type Reformation    Capacitor Last Charge Date Time 20221112043400    Capacitor Charge Time 10.4    Jesus Statistic Date Time Start 20210128000000    Jesus Statistic Date Time End 20230323000000    Jesus Statistic RV Percent Paced 0    Therapy Statistic Recent Shocks Delivered 0    Therapy Statistic Recent Shocks Aborted 0    Therapy Statistic Recent ATP Delivered 1    Therapy Statistic Recent Date Time Start 20210128000000    Therapy Statistic Recent Date Time End 20230323000000    Therapy Statistic Total Shocks Delivered 0    Therapy Statistic Total Shocks Aborted 0    Therapy Statistic Total ATP Delivered 1    Therapy Statistic Total  Date Time Start 20170608000000    Therapy Statistic Total  Date Time End 20230323000000    Episode Statistic Recent Count 13    Episode Statistic Type Category Other    Episode Statistic Recent Count 4    Episode Statistic Type Category VT    Episode Statistic Vendor Type Category NSVT    Episode Statistic Recent Count 0    Episode Statistic Type Category VF    Episode Statistic Vendor Type Category VF    Episode Statistic Recent Count 1    Episode Statistic Type  Category VT    Episode Statistic Vendor Type Category VT    Episode Statistic Recent Count 0    Episode Statistic Type Category VT    Episode Statistic Vendor Type Category VT-1    Episode Statistic Recent Date Time Start 20210128000000    Episode Statistic Recent Date Time End 20230323000000    Episode Statistic Recent Date Time Start 20210128000000    Episode Statistic Recent Date Time End 20230323000000    Episode Statistic Recent Date Time Start 20210128000000    Episode Statistic Recent Date Time End 20230323000000    Episode Statistic Recent Date Time Start 20210128000000    Episode Statistic Recent Date Time End 20230323000000    Episode Statistic Recent Date Time Start 20210128000000    Episode Statistic Recent Date Time End 20230323000000    Narrative    Patient seen in [facility] OR 26 for evaluation and iterative programming of   their ICD per MD orders. PRE-OP LVAD PROGRAMMING.     Device: MONOQI D150 DYNAGEN  Normal device function.   Mode: VVI 40 bpm  : <1%  Intrinsic Rhythm:  bpm  Lead Trends Appear Stable.   Estimated battery longevity to RRT = 10 years.  Ventricular Arrhythmia: over the last 2 days 1 VT episode at 183 bpm was   successfully converted with 1 ATP sequence. 1 NSVT lasting 4 sec at 181   bpm.  Setting Changes: ICD Tachy Therapies turned OFF per MD orders.    Plan: Page ICD RN for post-op programming  ABRAHAM Washington RN    Single lead ICD    I have reviewed and interpreted the device interrogation, settings,   programming and nurse's summary. The device is functioning within normal   device parameters. I agree with the current findings, assessment and plan.   XR Chest Port 1 View    Narrative    Exam: XR CHEST PORT 1 VIEW, 3/23/2023 2:52 PM    Comparison: Radiograph same day, 3/21/2023, 3/19/2023    History: Post Op CVTS Surgery    Findings:  Portable AP view of the chest. Endotracheal tube tip projects over the  low thoracic trachea. IABP marker projects 2.5 cm above the  etienne.  New postsurgical changes of the chest. Median sternotomy wires and  mediastinal clips. Mediastinal drains. Right IJ Paloma-Jalen catheter tip  projects in stable position over the distal right main pulmonary  artery. Partially visualized LVAD. Left chest wall cardiac device  leads is not fully visualized. Unknown linear density projecting over  the mid to low medial right hemithorax may be external. The left  costophrenic angle is not included in the field-of-view.  Cardiomediastinal silhouette is within normal limits. Mild perihilar  and dense retrocardiac opacities. No appreciable right pleural  effusion. No appreciable pneumothorax. Small amount of subcutaneous  emphysema in the lower neck.      Impression    Impression:  1. Endotracheal tube tip projects over the lower thoracic trachea.  Additional support devices as noted above.  2. New postsurgical changes of the chest with partially visualized  LVAD.   3. Unknown linear density projects over the mid to lower medial right  hemithorax, may be a external to the patient  4. No appreciable pneumothorax.  5. Mild perihilar and dense retrocardiac opacities, likely  atelectasis.    I have personally reviewed the examination and initial interpretation  and I agree with the findings.    CAITLIN RAO MD         SYSTEM ID:  [id number]   XR Abdomen Port 1 View    Narrative    EXAM: XR ABDOMEN PORT 1 VIEW  3/23/2023 2:53 PM      HISTORY: OG placement    COMPARISON: Same-day chest x-ray, CAP 3/17/2023    FINDINGS: Single AP view of the abdomen. Midline sternotomy wires.  Mediastinal drains. LVAD. Partially visualized left chest tube.  Enteric sidehole projects over the gastroesophageal junction.  Partially visualized Paloma-Jalen catheter.    Nonobstructive bowel gas pattern. No pneumatosis. Please see same day  chest x-ray for further thoracic findings. No acute osseous  abnormality.      Impression    IMPRESSION: Enteric tube sidehole projects over the  gastroesophageal  junction. Consider advancing 5 cm.    I have personally reviewed the examination and initial interpretation  and I agree with the findings.    HARDEEP FERNANDEZ MD         SYSTEM ID:  [id number]   Cardiac Device Check - Inpatient   Result Value    Date Time Interrogation Session [id number]    Implantable Pulse Generator  [employer]    Implantable Pulse Generator Model D150 DYNAGEN    Implantable Pulse Generator Serial Number [id number]    Type Interrogation Session In Clinic    Clinic Name [facility]    Implantable Pulse Generator Type Defibrillator    Implantable Pulse Generator Implant Date 20170608    Implantable Lead  [employer]    Implantable Lead Model 0293 [employer] [place] 4-Site SG    Implantable Lead Serial Number [id number]    Implantable Lead Implant Date 20170608    Implantable Lead Polarity Type Bipolar Lead    Implantable Lead Location Detail 1 UNKNOWN    Implantable Lead Location Right Ventricle    Jesus Setting Mode (NBG Code) VVI    Jesus Setting Lower Rate Limit 40    Lead Channel Setting Sensing Polarity Bipolar    Lead Channel Setting Sensing Sensitivity 0.6    Lead Channel Setting Sensing Adaptation Mode Adaptive    Lead Channel Setting Pacing Polarity Bipolar    Lead Channel Setting Pacing Pulse Width 0.4    Lead Channel Setting Pacing Amplitude 2.0    Zone Setting Type Category VF    Zone Setting Vendor Type Category VF    Zone Setting Detection Interval 300    Zone Setting Type Category VT    Zone Setting Vendor Type Category VT    Zone Setting Detection Interval 353    Lead Channel Impedance Value 410    Lead Channel Pacing Threshold Amplitude 1.0    Lead Channel Pacing Threshold Pulse Width 0.4    Battery Date Time of Measurements [id number]    Battery Status Beginning of Service    Battery Remaining Longevity 120    Battery Remaining Percentage 100    Capacitor Charge Type Reformation    Capacitor Last Charge  Date Time 20221112043400    Capacitor Charge Time 10.4    Jesus Statistic Date Time Start 20230323000000    Jesus Statistic Date Time End 20230323000000    Jesus Statistic RV Percent Paced 0    Therapy Statistic Recent Shocks Delivered 0    Therapy Statistic Recent Shocks Aborted 0    Therapy Statistic Recent ATP Delivered 0    Therapy Statistic Recent Date Time Start 20230323000000    Therapy Statistic Recent Date Time End 20230323000000    Therapy Statistic Total Shocks Delivered 0    Therapy Statistic Total Shocks Aborted 0    Therapy Statistic Total ATP Delivered 1    Therapy Statistic Total  Date Time Start 20170608000000    Therapy Statistic Total  Date Time End 20230323000000    Episode Statistic Recent Count 0    Episode Statistic Type Category Other    Episode Statistic Recent Count 0    Episode Statistic Type Category VT    Episode Statistic Vendor Type Category NSVT    Episode Statistic Recent Count 0    Episode Statistic Type Category VF    Episode Statistic Vendor Type Category VF    Episode Statistic Recent Count 0    Episode Statistic Type Category VT    Episode Statistic Vendor Type Category VT    Episode Statistic Recent Count 0    Episode Statistic Type Category VT    Episode Statistic Vendor Type Category VT-1    Episode Statistic Recent Date Time Start 16259872010145    Episode Statistic Recent Date Time End [id number]    Episode Statistic Recent Date Time Start 20230323000000    Episode Statistic Recent Date Time End [id number]    Episode Statistic Recent Date Time Start [id number]    Episode Statistic Recent Date Time End 82591704831263    Episode Statistic Recent Date Time Start 89097887385271    Episode Statistic Recent Date Time End [id number]    Episode Statistic Recent Date Time Start 20230323000000    Episode Statistic Recent Date Time End [id number]    Narrative    Patient seen in the [facility] CVICU for evaluation and iterative programming of   their ICD per MD orders,  post-op LVAD Implant.    Device: [employer] D150 DYNAGEN  Normal device function.   Mode: VVI 40 bpm  Intrinsic Rhythm:  bpm  Estimated battery longevity to RRT = 10 years.    Setting Changes: ICD Tachy Therapies Turned ON.    ABRAHAM Washington RN  Single lead ICD    I have reviewed and interpreted the device interrogation, settings,   programming and nurse's summary. The device is functioning within normal   device parameters. I agree with the current findings, assessment and plan.

## 2023-03-26 NOTE — PROGRESS NOTES
Cardiology Progress Note       Changes Today:   -bumex 2 mg IV BID   -transfer to floor     Physical Exam   Temp: 98.8  F (37.1  C) Temp src: Oral   Pulse: (!) 121   Resp: (!) 35 SpO2: 94 % O2 Device: Nasal cannula Oxygen Delivery: 3 LPM    Vital Signs with Ranges  Temp:  [97.9  F (36.6  C)-99.5  F (37.5  C)] 98.8  F (37.1  C)  Pulse:  [] 121  Resp:  [12-36] 35  MAP:  [59 mmHg-101 mmHg] 88 mmHg  Arterial Line BP: ()/(52-97) 92/81  SpO2:  [83 %-99 %] 94 %    LVAD Settings  Heartmate 3 LEFT VS  Flow (Lpm): 4.3 Lpm  Pulse Index (PI): 3.8 PI  Speed (rpm): 5200 rpm  Power (mari): 3.6 mari  Current Hct settin      Intake/Output    Intake/Output Summary (Last 24 hours) at 3/26/2023 0725  Last data filed at 3/26/2023 0700  Gross per 24 hour   Intake 2864.58 ml   Output 4480 ml   Net -1615.42 ml       Weight  Vitals:    23 0400 23 0544 23 0000 23 0400   Weight: 75.3 kg (166 lb 0.1 oz) 73.4 kg (161 lb 13.1 oz) 80.5 kg (177 lb 7.5 oz) 81.1 kg (178 lb 12.7 oz)    23 0600   Weight: 82.1 kg (181 lb)       Resp: (!) 35        DOBUTamine 2.5 mcg/kg/min (23 0700)     heparin 1,250 Units/hr (23 0700)     BETA BLOCKER NOT PRESCRIBED           acetaminophen  975 mg Oral Q8H HOWIE     aspirin  81 mg Oral or NG Tube Daily     captopril  6.25 mg Oral Q8H HOWIE     digoxin  250 mcg Oral Daily     gabapentin  300 mg Oral or Feeding Tube TID     hydrALAZINE  25 mg Oral 4x Daily     insulin aspart  1-7 Units Subcutaneous TID AC     insulin aspart  1-5 Units Subcutaneous At Bedtime     lidocaine  1 patch Transdermal Q24H     lidocaine   Transdermal Q8H HOWIE     magnesium sulfate  2 g Intravenous Once     pantoprazole  40 mg Intravenous BID     polyethylene glycol  17 g Oral Daily     senna-docusate  1 tablet Oral BID     sodium chloride (PF)  3 mL Intracatheter Q8H     Warfarin Therapy Reminder  1 each Oral See Admin Instructions        , Blood pressure 100/60, pulse (!) 121, temperature  "98.8  F (37.1  C), temperature source Oral, resp. rate (!) 35, height 1.702 m (5' 7\"), weight 82.1 kg (181 lb), SpO2 94 %.  Constitutional: awake, alert, cooperative, no apparent distress, and appears stated age  Eyes: sclera clear, conjunctiva normal  Respiratory: No increased work of breathing, good air exchange, no wheezing  Cardiovascular: LVAD humm  GI: soft, non-distended, non-tender, no masses palpated  Skin: no bruising or bleeding  Neurologic: Awake, alert, oriented to name, place and time.  Cranial nerves II-XII are grossly intact.       Data   Recent Labs   Lab Test 03/25/23 0356 03/25/23 0350 03/24/23 2012 03/24/23 2006   NA  --  129*  --  128*   POTASSIUM  --  4.4  --  4.5   CHLORIDE  --  92*  --  92*   CO2  --  27  --  23   ANIONGAP  --  10  --  13   * 117*   < > 211*   BUN  --  25.2*  --  27.2*   CR  --  1.01  --  1.14   TONY  --  8.9  --  8.7    < > = values in this interval not displayed.       Lab Results   Component Value Date    WBC 22.7 03/25/2023    WBC 9.8 11/20/2019     Lab Results   Component Value Date    RBC 3.92 03/25/2023    RBC 4.70 11/20/2019     Lab Results   Component Value Date    HGB 11.9 03/25/2023    HGB 14.3 11/20/2019     Lab Results   Component Value Date    HCT 37.3 03/25/2023    HCT 45.3 11/20/2019     No components found for: MCT  Lab Results   Component Value Date    MCV 95 03/25/2023    MCV 96 11/20/2019     Lab Results   Component Value Date    MCH 30.4 03/25/2023    MCH 30.4 11/20/2019     Lab Results   Component Value Date    MCHC 31.9 03/25/2023    MCHC 31.6 11/20/2019     Lab Results   Component Value Date    RDW 15.0 03/25/2023    RDW 13.0 11/20/2019     Lab Results   Component Value Date     03/25/2023     11/20/2019        Liver Function Studies -   Recent Labs   Lab Test 03/25/23  0350   PROTTOTAL 5.1*   ALBUMIN 2.9*   BILITOTAL 4.0*   ALKPHOS 75   *   ALT 36       Venous Blood Gas  Recent Labs   Lab 03/26/23  0400 03/25/23  2340 " 03/25/23 2047 03/25/23 1726   PHV 7.42 7.40 7.38 7.39   PCO2V 57* 54* 60* 58*   PO2V 38 39 30 35   HCO3V 37* 34* 35* 35*   SAUNDRA 10.3* 7.4* 8.1* 8.6*   O2PER 3 4 4 4       Arterial Blood Gas  Recent Labs   Lab 03/26/23  0400 03/25/23  2340 03/25/23 2047 03/25/23 1726 03/24/23 2006 03/24/23  1629 03/24/23  0750 03/24/23  0618 03/24/23  0356 03/24/23  0341 03/23/23  2354   PH  --   --   --   --   --  7.42  --  7.46* 7.45  --  7.44   PCO2  --   --   --   --   --  42  --  38 39  --  40   PO2  --   --   --   --   --  95  --  85 89  --  137*   HCO3  --   --   --   --   --  27  --  27 27  --  27   O2PER 3 4 4 4   < > 30   < > 30 30   < > 35    < > = values in this interval not displayed.          No results found for this or any previous visit (from the past 24 hour(s)).    Blood culture:  Results for orders placed or performed during the hospital encounter of 03/15/23   Blood Culture Hand, Right    Specimen: Hand, Right; Blood   Result Value Ref Range    Culture No Growth    Blood Culture Line, venous    Specimen: Line, venous; Blood   Result Value Ref Range    Culture No Growth       Urine culture:  No results found for this or any previous visit.    Assessment & Plan    57 year old man with past medical history of HFrEF (EF 10% 5/2022) 2/2 NICM s/p ICD, tobacco use disorder, marijuana use, HTN, lumbar radiculopathy, CKD who presents after being found down at home. Found to have had a prolonged episode of VT with concern for cardiac arrest s/p ROSC in the field now s/p LVAD with HM3 on 03/23/23 and tricuspid annuloplasty.      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 Presented with out of hospital cardiac arrest with VT for 5 min 43 seconds of VT at 199 bpm, with spontaneous conversion to sinus rhythm without defibrillation on device interrogation on 3/15/2023.    - VT secondary prevention ICD in place  - Inotropes:wean dobutamine today follow MV02 to ensure stable   - Volume status: hypervolemic, currently diuresing bumex 2mg IV BID today   - BB: Hold PTA carvedilol 12.5 mg BID   - ACEi/ARB/ARNI: Captopril 6.25 mg TID   - MRA: hold PTA spironolactone 12.5 mg BID  - SGLT2i: none PTA  - SCD ppx/BiV pacer: ICD, reprogrammed device to provide ATP burst at VT threshold  - Heparin and coumadin when safe from surgical perspective  - Digoxin load on 3/25 for AF with RVR, now on digoxin 250 mcg daily, may need to go back on amiodarone if rates are not controlled while on the floor, currently he is doing fine with rates in the 100s     Patient evaluated with Dr. Ceballos.     Mikey Hidalgo MD  Cardiology Fellow  Pager: [phone]

## 2023-03-26 NOTE — PLAN OF CARE
Major Shift Events:  No acute events overnight. AOx4, afebrile.  Pain managed with scheduled tylenol and PRN oxycodone.  Normal sinus tach in the 100-110s. Dobutamine 2.5 mcg/kg/min.  CVP 10-12 and SvO2 69.  Given 6.25mg of captopril and MAP dropped 15 points to around 60 for about 45 minutes.  Otherwise MAP maintained 65-80 for majority of shift.  Held morning captopril.  No LVAD alarms.  Flows 4.2-4.8, PI 2.4-4.1, Power 3.6-3.7, and Speed 5200.  On 3L nasal cannula.  Poor appetite and no BM.  Unable to void in urinal, straight cath done twice.  CT output down to 30-40 q4h.  Morning labs stable, potassium and magnesium replaced.    Plan: Wean dobutamine as tolerated? Give oral antihypertensives as tolerated.    For vital signs and complete assessments, please see documentation flowsheets.

## 2023-03-26 NOTE — PROGRESS NOTES
03/26/23 1200   Living Environment   People in Home spouse   Current Living Arrangements house   Home Accessibility stairs to enter home   Number of Stairs, Main Entrance 8   Stair Railings, Main Entrance railing on left side (ascending)   Transportation Anticipated car, drives self;family or friend will provide   Living Environment Comments Pt lives in a one level home with tub-shower and no DME. Pt's son in home often and wife WFM can assist as needed   Self-Care   Usual Activity Tolerance moderate   Current Activity Tolerance fair   Regular Exercise No   Equipment Currently Used at Home none   Fall history within last six months no   Activity/Exercise/Self-Care Comment Pt ind at baseline   General Information   Onset of Illness/Injury or Date of Surgery 03/23/23   Referring Physician Jimena Courtney MD   Additional Occupational Profile Info/Pertinent History of Current Problem Mr. Fragoso is a 57 year old gentleman with a medical history of HFrEF (LVEF 10%) secondary to nonischemic cardiomyopathy, ICD, chronic tobacco and marijuana use, COPD, HTN, CKD stage III, found down after VT arrest 3/15 who is admitted to the ICU 3/23 s/p Hm3 LVAD placement, tricuspid valve annuloplasty, IABP. Today, Mr. Fragoso presents critically ill with cardiogenic shock, respiratory insufficiency.   Existing Precautions/Restrictions sternal;fall;oxygen therapy device and L/min   Left Upper Extremity (Weight-bearing Status)   (10#)   Right Upper Extremity (Weight-bearing Status)   (10#)   Cognitive Status Examination   Cognitive Status Comments no concerns   Visual Perception   Impact of Vision Impairment on Function (Vision) no acute changes noted   Range of Motion Comprehensive   Comment, General Range of Motion WFL w/in precautions   Strength Comprehensive (MMT)   Comment, General Manual Muscle Testing (MMT) Assessment overall deconditioned   Transfers   Transfer Comments total Ax2  OH lift   Bathing Assessment/Intervention    Comment, (Bathing) per clinical judgement anticiapte mod A   Upper Body Dressing Assessment/Training   Comment, (Upper Body Dressing) per clinical judgement anticiapte mod A   Lower Body Dressing Assessment/Training   Comment, (Lower Body Dressing) per clinical judgement anticiapte mod A   Toileting   Comment, (Toileting) per clinical judgement anticiapte max Ax2   Clinical Impression   Criteria for Skilled Therapeutic Interventions Met (OT) Yes, treatment indicated   OT Diagnosis decreased ind in I/ADLS   OT Problem List-Impairments impacting ADL problems related to;activity tolerance impaired;mobility;strength;pain;post-surgical precautions   ADL comments/analysis decreased ind in I/ADLS   Assessment of Occupational Performance 1-3 Performance Deficits   Planned Therapy Interventions (OT) ADL retraining;IADL retraining;balance training;bed mobility training;strengthening;transfer training;home program guidelines;progressive activity/exercise;risk factor education   Clinical Decision Making Complexity (OT) low complexity   Risk & Benefits of therapy have been explained evaluation/treatment results reviewed;care plan/treatment goals reviewed;patient   OT Total Evaluation Time   OT Re-Eval Minutes (04530) 5

## 2023-03-26 NOTE — PLAN OF CARE
Shift Highlights:  Pt in Afib with rate in 100-110s, brief period of Afib RVR with rates 150-160s. Resolved without intervention. MAP and PI labile -- MD aware of intermittently low PI (1.9-2.5) with soft MAPs after afternoon dose of captopril.     Adkins catheter replaced due to urinary retention. Phosphorus replaced. Heparin gtt at 1400 units/hr.    Transferred to  at 1645 with personal belongings and LVAD supplies.     See flowsheet for full assessment data.       Goal Outcome Evaluation:    Plan of Care Reviewed With: patient, spouse    Overall Patient Progress: improving    Outcome Evaluation: Remains on heparin gtt, therapeutic. Brief episode of afib in morning, resolved without intervention. Off dobutamine. Pain managed with scheduled tylenol, PRN oxycodone/dilaudid. Up to chair x1. Transferred to .

## 2023-03-27 ENCOUNTER — APPOINTMENT (OUTPATIENT)
Dept: PHYSICAL THERAPY | Facility: CLINIC | Age: 58
DRG: 001 | End: 2023-03-27
Attending: STUDENT IN AN ORGANIZED HEALTH CARE EDUCATION/TRAINING PROGRAM
Payer: COMMERCIAL

## 2023-03-27 ENCOUNTER — APPOINTMENT (OUTPATIENT)
Dept: GENERAL RADIOLOGY | Facility: CLINIC | Age: 58
DRG: 001 | End: 2023-03-27
Attending: PHYSICIAN ASSISTANT
Payer: COMMERCIAL

## 2023-03-27 ENCOUNTER — APPOINTMENT (OUTPATIENT)
Dept: GENERAL RADIOLOGY | Facility: CLINIC | Age: 58
DRG: 001 | End: 2023-03-27
Attending: INTERNAL MEDICINE
Payer: COMMERCIAL

## 2023-03-27 LAB
A*: NORMAL
A*LOCUS SEROLOGIC EQUIVALENT: 1
A*LOCUS: NORMAL
A*SEROLOGIC EQUIVALENT: 68
ABTEST METHOD: NORMAL
ALBUMIN SERPL BCG-MCNC: 2.5 G/DL (ref 3.5–5.2)
ALBUMIN SERPL BCG-MCNC: 2.6 G/DL (ref 3.5–5.2)
ALP SERPL-CCNC: 80 U/L (ref 40–129)
ALP SERPL-CCNC: 83 U/L (ref 40–129)
ALT SERPL W P-5'-P-CCNC: 24 U/L (ref 10–50)
ALT SERPL W P-5'-P-CCNC: 26 U/L (ref 10–50)
ANION GAP SERPL CALCULATED.3IONS-SCNC: 10 MMOL/L (ref 7–15)
ANION GAP SERPL CALCULATED.3IONS-SCNC: 9 MMOL/L (ref 7–15)
AST SERPL W P-5'-P-CCNC: 64 U/L (ref 10–50)
AST SERPL W P-5'-P-CCNC: 68 U/L (ref 10–50)
B*: NORMAL
B*LOCUS SEROLOGIC EQUIVALENT: 64
B*LOCUS: NORMAL
B*SEROLOGIC EQUIVALENT: 62
BILIRUB SERPL-MCNC: 1.4 MG/DL
BILIRUB SERPL-MCNC: 1.6 MG/DL
BUN SERPL-MCNC: 17.9 MG/DL (ref 6–20)
BUN SERPL-MCNC: 21.5 MG/DL (ref 6–20)
BW-1: NORMAL
C*: NORMAL
C*LOCUS SEROLOGIC EQUIVALENT: 10
C*LOCUS: NORMAL
C*SEROLOGIC EQUIVALENT: 8
CA-I BLD-MCNC: 4.3 MG/DL (ref 4.4–5.2)
CALCIUM SERPL-MCNC: 8.1 MG/DL (ref 8.6–10)
CALCIUM SERPL-MCNC: 8.5 MG/DL (ref 8.6–10)
CHLORIDE SERPL-SCNC: 87 MMOL/L (ref 98–107)
CHLORIDE SERPL-SCNC: 88 MMOL/L (ref 98–107)
CREAT SERPL-MCNC: 0.96 MG/DL (ref 0.67–1.17)
CREAT SERPL-MCNC: 1.05 MG/DL (ref 0.67–1.17)
DEPRECATED HCO3 PLAS-SCNC: 32 MMOL/L (ref 22–29)
DEPRECATED HCO3 PLAS-SCNC: 34 MMOL/L (ref 22–29)
DPA1*: NORMAL
DPB1*: NORMAL
DPB1*LOCUS NMDP: NORMAL
DPB1*LOCUS: NORMAL
DPB1*NMDP: NORMAL
DQA1*LOCUS: NORMAL
DQB1*: NORMAL
DQB1*LOCUS SEROLOGIC EQUIVALENT: 2
DQB1*LOCUS: NORMAL
DQB1*SEROLOGIC EQUIVALENT: 9
DRB1*LOCUS SEROLOGIC EQUIVALENT: 7
DRB1*LOCUS: NORMAL
DRB4*: NORMAL
DRB4*LOCUS SEROLOGIC EQUIVALENT: 53
DRB4*LOCUS: NORMAL
DRB4*SEROLOGIC EQUIVALENT: 53
DRSSO TEST METHOD: NORMAL
ERYTHROCYTE [DISTWIDTH] IN BLOOD BY AUTOMATED COUNT: 14.6 % (ref 10–15)
GFR SERPL CREATININE-BSD FRML MDRD: 84 ML/MIN/1.73M2
GFR SERPL CREATININE-BSD FRML MDRD: >90 ML/MIN/1.73M2
GLUCOSE BLDC GLUCOMTR-MCNC: 107 MG/DL (ref 70–99)
GLUCOSE BLDC GLUCOMTR-MCNC: 116 MG/DL (ref 70–99)
GLUCOSE BLDC GLUCOMTR-MCNC: 148 MG/DL (ref 70–99)
GLUCOSE BLDC GLUCOMTR-MCNC: 92 MG/DL (ref 70–99)
GLUCOSE SERPL-MCNC: 111 MG/DL (ref 70–99)
GLUCOSE SERPL-MCNC: 146 MG/DL (ref 70–99)
HCT VFR BLD AUTO: 34.3 % (ref 40–53)
HGB BLD-MCNC: 10.7 G/DL (ref 13.3–17.7)
INR PPP: 1.4 (ref 0.85–1.15)
MAGNESIUM SERPL-MCNC: 1.4 MG/DL (ref 1.7–2.3)
MAGNESIUM SERPL-MCNC: 2.3 MG/DL (ref 1.7–2.3)
MCH RBC QN AUTO: 29.9 PG (ref 26.5–33)
MCHC RBC AUTO-ENTMCNC: 31.2 G/DL (ref 31.5–36.5)
MCV RBC AUTO: 96 FL (ref 78–100)
PATH REPORT.COMMENTS IMP SPEC: NORMAL
PATH REPORT.COMMENTS IMP SPEC: NORMAL
PATH REPORT.FINAL DX SPEC: NORMAL
PATH REPORT.GROSS SPEC: NORMAL
PATH REPORT.MICROSCOPIC SPEC OTHER STN: NORMAL
PATH REPORT.RELEVANT HX SPEC: NORMAL
PHOSPHATE SERPL-MCNC: 3 MG/DL (ref 2.5–4.5)
PHOTO IMAGE: NORMAL
PLATELET # BLD AUTO: 198 10E3/UL (ref 150–450)
PLATELET # BLD AUTO: 198 10E3/UL (ref 150–450)
POTASSIUM SERPL-SCNC: 3.6 MMOL/L (ref 3.4–5.3)
POTASSIUM SERPL-SCNC: 3.7 MMOL/L (ref 3.4–5.3)
PROT SERPL-MCNC: 4.9 G/DL (ref 6.4–8.3)
PROT SERPL-MCNC: 4.9 G/DL (ref 6.4–8.3)
RBC # BLD AUTO: 3.58 10E6/UL (ref 4.4–5.9)
SA 1 CELL: NORMAL
SA 1 TEST METHOD: NORMAL
SA 2 CELL: NORMAL
SA 2 TEST METHOD: NORMAL
SA1 HI RISK ABY: NORMAL
SA1 MOD RISK ABY: NORMAL
SA2 HI RISK ABY: NORMAL
SA2 MOD RISK ABY: NORMAL
SARS-COV-2 RNA RESP QL NAA+PROBE: NEGATIVE
SODIUM SERPL-SCNC: 129 MMOL/L (ref 136–145)
SODIUM SERPL-SCNC: 129 MMOL/L (ref 136–145)
SODIUM SERPL-SCNC: 130 MMOL/L (ref 136–145)
UFH PPP CHRO-ACNC: 0.3 IU/ML
UNACCEPTABLE ANTIGENS: NORMAL
UNOS CPRA: 0
WBC # BLD AUTO: 15.3 10E3/UL (ref 4–11)
ZZZSA 1  COMMENTS: NORMAL
ZZZSA 2 COMMENTS: NORMAL

## 2023-03-27 PROCEDURE — 99233 SBSQ HOSP IP/OBS HIGH 50: CPT | Mod: 25 | Performed by: INTERNAL MEDICINE

## 2023-03-27 PROCEDURE — 250N000011 HC RX IP 250 OP 636: Performed by: STUDENT IN AN ORGANIZED HEALTH CARE EDUCATION/TRAINING PROGRAM

## 2023-03-27 PROCEDURE — 36415 COLL VENOUS BLD VENIPUNCTURE: CPT | Performed by: NURSE PRACTITIONER

## 2023-03-27 PROCEDURE — 93750 INTERROGATION VAD IN PERSON: CPT | Performed by: INTERNAL MEDICINE

## 2023-03-27 PROCEDURE — 250N000013 HC RX MED GY IP 250 OP 250 PS 637: Performed by: STUDENT IN AN ORGANIZED HEALTH CARE EDUCATION/TRAINING PROGRAM

## 2023-03-27 PROCEDURE — U0003 INFECTIOUS AGENT DETECTION BY NUCLEIC ACID (DNA OR RNA); SEVERE ACUTE RESPIRATORY SYNDROME CORONAVIRUS 2 (SARS-COV-2) (CORONAVIRUS DISEASE [COVID-19]), AMPLIFIED PROBE TECHNIQUE, MAKING USE OF HIGH THROUGHPUT TECHNOLOGIES AS DESCRIBED BY CMS-2020-01-R: HCPCS | Performed by: PHYSICIAN ASSISTANT

## 2023-03-27 PROCEDURE — 83735 ASSAY OF MAGNESIUM: CPT | Performed by: THORACIC SURGERY (CARDIOTHORACIC VASCULAR SURGERY)

## 2023-03-27 PROCEDURE — 36415 COLL VENOUS BLD VENIPUNCTURE: CPT | Performed by: STUDENT IN AN ORGANIZED HEALTH CARE EDUCATION/TRAINING PROGRAM

## 2023-03-27 PROCEDURE — 36415 COLL VENOUS BLD VENIPUNCTURE: CPT | Performed by: PHYSICIAN ASSISTANT

## 2023-03-27 PROCEDURE — 97530 THERAPEUTIC ACTIVITIES: CPT | Mod: GP

## 2023-03-27 PROCEDURE — 84295 ASSAY OF SERUM SODIUM: CPT | Performed by: NURSE PRACTITIONER

## 2023-03-27 PROCEDURE — 97110 THERAPEUTIC EXERCISES: CPT | Mod: GP

## 2023-03-27 PROCEDURE — 85027 COMPLETE CBC AUTOMATED: CPT | Performed by: PHYSICIAN ASSISTANT

## 2023-03-27 PROCEDURE — 82330 ASSAY OF CALCIUM: CPT | Performed by: STUDENT IN AN ORGANIZED HEALTH CARE EDUCATION/TRAINING PROGRAM

## 2023-03-27 PROCEDURE — 83735 ASSAY OF MAGNESIUM: CPT | Performed by: INTERNAL MEDICINE

## 2023-03-27 PROCEDURE — 250N000011 HC RX IP 250 OP 636: Performed by: INTERNAL MEDICINE

## 2023-03-27 PROCEDURE — 71045 X-RAY EXAM CHEST 1 VIEW: CPT | Mod: 26 | Performed by: RADIOLOGY

## 2023-03-27 PROCEDURE — 84295 ASSAY OF SERUM SODIUM: CPT | Performed by: PHYSICIAN ASSISTANT

## 2023-03-27 PROCEDURE — 71045 X-RAY EXAM CHEST 1 VIEW: CPT

## 2023-03-27 PROCEDURE — 84155 ASSAY OF PROTEIN SERUM: CPT | Performed by: STUDENT IN AN ORGANIZED HEALTH CARE EDUCATION/TRAINING PROGRAM

## 2023-03-27 PROCEDURE — 84100 ASSAY OF PHOSPHORUS: CPT | Performed by: INTERNAL MEDICINE

## 2023-03-27 PROCEDURE — 85610 PROTHROMBIN TIME: CPT | Performed by: STUDENT IN AN ORGANIZED HEALTH CARE EDUCATION/TRAINING PROGRAM

## 2023-03-27 PROCEDURE — 80053 COMPREHEN METABOLIC PANEL: CPT | Performed by: STUDENT IN AN ORGANIZED HEALTH CARE EDUCATION/TRAINING PROGRAM

## 2023-03-27 PROCEDURE — 250N000013 HC RX MED GY IP 250 OP 250 PS 637: Performed by: INTERNAL MEDICINE

## 2023-03-27 PROCEDURE — 97161 PT EVAL LOW COMPLEX 20 MIN: CPT | Mod: GP

## 2023-03-27 PROCEDURE — 71045 X-RAY EXAM CHEST 1 VIEW: CPT | Mod: 77

## 2023-03-27 PROCEDURE — 120N000003 HC R&B IMCU UMMC

## 2023-03-27 PROCEDURE — 85520 HEPARIN ASSAY: CPT | Performed by: INTERNAL MEDICINE

## 2023-03-27 RX ORDER — POTASSIUM CHLORIDE 750 MG/1
20 TABLET, EXTENDED RELEASE ORAL ONCE
Status: COMPLETED | OUTPATIENT
Start: 2023-03-27 | End: 2023-03-27

## 2023-03-27 RX ORDER — MAGNESIUM SULFATE HEPTAHYDRATE 40 MG/ML
4 INJECTION, SOLUTION INTRAVENOUS ONCE
Status: COMPLETED | OUTPATIENT
Start: 2023-03-27 | End: 2023-03-27

## 2023-03-27 RX ORDER — SPIRONOLACTONE 25 MG
12.5 TABLET ORAL DAILY
Status: DISCONTINUED | OUTPATIENT
Start: 2023-03-27 | End: 2023-04-09 | Stop reason: HOSPADM

## 2023-03-27 RX ORDER — WARFARIN SODIUM 5 MG/1
5 TABLET ORAL
Status: COMPLETED | OUTPATIENT
Start: 2023-03-27 | End: 2023-03-27

## 2023-03-27 RX ORDER — BUMETANIDE 0.25 MG/ML
2 INJECTION INTRAMUSCULAR; INTRAVENOUS 2 TIMES DAILY
Status: COMPLETED | OUTPATIENT
Start: 2023-03-27 | End: 2023-03-27

## 2023-03-27 RX ADMIN — ACETAMINOPHEN 975 MG: 325 TABLET, FILM COATED ORAL at 06:22

## 2023-03-27 RX ADMIN — POTASSIUM CHLORIDE 20 MEQ: 750 TABLET, EXTENDED RELEASE ORAL at 08:33

## 2023-03-27 RX ADMIN — OXYCODONE HYDROCHLORIDE 10 MG: 10 TABLET ORAL at 19:58

## 2023-03-27 RX ADMIN — INSULIN ASPART 1 UNITS: 100 INJECTION, SOLUTION INTRAVENOUS; SUBCUTANEOUS at 12:15

## 2023-03-27 RX ADMIN — ACETAMINOPHEN 650 MG: 325 TABLET ORAL at 10:27

## 2023-03-27 RX ADMIN — GABAPENTIN 300 MG: 300 CAPSULE ORAL at 19:58

## 2023-03-27 RX ADMIN — HEPARIN SODIUM 1400 UNITS/HR: 10000 INJECTION, SOLUTION INTRAVENOUS at 04:36

## 2023-03-27 RX ADMIN — HYDROMORPHONE HYDROCHLORIDE 0.2 MG: 0.2 INJECTION, SOLUTION INTRAMUSCULAR; INTRAVENOUS; SUBCUTANEOUS at 11:39

## 2023-03-27 RX ADMIN — ASPIRIN 81 MG 81 MG: 81 TABLET ORAL at 08:33

## 2023-03-27 RX ADMIN — OXYCODONE HYDROCHLORIDE 10 MG: 10 TABLET ORAL at 08:50

## 2023-03-27 RX ADMIN — Medication 6.25 MG: at 22:14

## 2023-03-27 RX ADMIN — HEPARIN SODIUM 1400 UNITS/HR: 10000 INJECTION, SOLUTION INTRAVENOUS at 22:43

## 2023-03-27 RX ADMIN — Medication 6.25 MG: at 13:54

## 2023-03-27 RX ADMIN — HYDROMORPHONE HYDROCHLORIDE 0.4 MG: 0.2 INJECTION, SOLUTION INTRAMUSCULAR; INTRAVENOUS; SUBCUTANEOUS at 16:52

## 2023-03-27 RX ADMIN — WARFARIN SODIUM 5 MG: 5 TABLET ORAL at 17:47

## 2023-03-27 RX ADMIN — BUMETANIDE 2 MG: 0.25 INJECTION INTRAMUSCULAR; INTRAVENOUS at 13:24

## 2023-03-27 RX ADMIN — ACETAMINOPHEN 975 MG: 325 TABLET, FILM COATED ORAL at 22:13

## 2023-03-27 RX ADMIN — Medication 6.25 MG: at 06:22

## 2023-03-27 RX ADMIN — GABAPENTIN 300 MG: 300 CAPSULE ORAL at 08:33

## 2023-03-27 RX ADMIN — MAGNESIUM SULFATE IN WATER 4 G: 40 INJECTION, SOLUTION INTRAVENOUS at 08:32

## 2023-03-27 RX ADMIN — HYDROMORPHONE HYDROCHLORIDE 0.2 MG: 0.2 INJECTION, SOLUTION INTRAMUSCULAR; INTRAVENOUS; SUBCUTANEOUS at 13:24

## 2023-03-27 RX ADMIN — OXYCODONE HYDROCHLORIDE 10 MG: 10 TABLET ORAL at 14:32

## 2023-03-27 RX ADMIN — LIDOCAINE PATCH 4% 1 PATCH: 40 PATCH TOPICAL at 08:34

## 2023-03-27 RX ADMIN — SPIRONOLACTONE 12.5 MG: 25 TABLET, FILM COATED ORAL at 13:24

## 2023-03-27 RX ADMIN — POLYETHYLENE GLYCOL 3350 17 G: 17 POWDER, FOR SOLUTION ORAL at 08:34

## 2023-03-27 RX ADMIN — ACETAMINOPHEN 975 MG: 325 TABLET, FILM COATED ORAL at 13:24

## 2023-03-27 RX ADMIN — METHOCARBAMOL 750 MG: 750 TABLET ORAL at 10:27

## 2023-03-27 RX ADMIN — PANTOPRAZOLE SODIUM 40 MG: 40 TABLET, DELAYED RELEASE ORAL at 08:33

## 2023-03-27 RX ADMIN — DIGOXIN 250 MCG: 125 TABLET ORAL at 08:33

## 2023-03-27 RX ADMIN — GABAPENTIN 300 MG: 300 CAPSULE ORAL at 13:25

## 2023-03-27 RX ADMIN — HYDROMORPHONE HYDROCHLORIDE 0.4 MG: 0.2 INJECTION, SOLUTION INTRAMUSCULAR; INTRAVENOUS; SUBCUTANEOUS at 22:13

## 2023-03-27 RX ADMIN — SENNOSIDES AND DOCUSATE SODIUM 2 TABLET: 8.6; 5 TABLET ORAL at 08:33

## 2023-03-27 RX ADMIN — BUMETANIDE 2 MG: 0.25 INJECTION INTRAMUSCULAR; INTRAVENOUS at 20:28

## 2023-03-27 RX ADMIN — OXYCODONE HYDROCHLORIDE 5 MG: 5 TABLET ORAL at 04:25

## 2023-03-27 ASSESSMENT — ACTIVITIES OF DAILY LIVING (ADL)
ADLS_ACUITY_SCORE: 26

## 2023-03-27 NOTE — CONSULTS
"Cardiology Progress Note       Changes Today:   Continue diuresis  Restart Aldactone  Doing well off of dobutamine    Physical Exam   Temp: 98.2  F (36.8  C) Temp src: Oral BP: (!)  Pulse: 111   Resp: 17 SpO2: 96 % O2 Device: Nasal cannula Oxygen Delivery: 2 LPM    Vital Signs with Ranges  Temp:  [98.1  F (36.7  C)-98.7  F (37.1  C)] 98.2  F (36.8  C)  Pulse:  [] 111  Resp:  [16-35] 17  BP: ()/(49-80)   SpO2:  [91 %-100 %] 96 %      LVAD Settings  Heartmate 3 LEFT VS  Flow (Lpm): 4.4 Lpm  Pulse Index (PI): 3.4 PI  Speed (rpm): 5200 rpm  Power (mari): 3.6 mari  Current Hct settin    Intake/Output    Intake/Output Summary (Last 24 hours) at 3/27/2023 0759  Last data filed at 3/27/2023 0436  Gross per 24 hour   Intake 1259.82 ml   Output 3965 ml   Net -2705.18 ml       Weight  Vitals:    23 0544 23 0000 23 0400 23 0600   Weight: 73.4 kg (161 lb 13.1 oz) 80.5 kg (177 lb 7.5 oz) 81.1 kg (178 lb 12.7 oz) 82.1 kg (181 lb)    23 0046   Weight: 83.5 kg (184 lb 1.4 oz)       Resp: 17        heparin 1,400 Units/hr (23 0830)     BETA BLOCKER NOT PRESCRIBED           acetaminophen  975 mg Oral Q8H HOWIE     aspirin  81 mg Oral or NG Tube Daily     captopril  6.25 mg Oral Q8H HOWIE     digoxin  250 mcg Oral Daily     gabapentin  300 mg Oral or Feeding Tube TID     insulin aspart  1-7 Units Subcutaneous TID AC     insulin aspart  1-5 Units Subcutaneous At Bedtime     lidocaine  1 patch Transdermal Q24H     lidocaine   Transdermal Q8H HOWIE     pantoprazole  40 mg Oral QAM AC     polyethylene glycol  17 g Oral BID     senna-docusate  2 tablet Oral BID     sodium chloride (PF)  3 mL Intracatheter Q8H     Warfarin Therapy Reminder  1 each Oral See Admin Instructions        , Blood pressure (!) 84/76, pulse 111, temperature 98.2  F (36.8  C), temperature source Oral, resp. rate 17, height 1.702 m (5' 7\"), weight 83.5 kg (184 lb 1.4 oz), SpO2 96 %.  Constitutional: awake, " alert, cooperative, no apparent distress, and appears stated age  Eyes: sclera clear, conjunctiva normal  Respiratory: No increased work of breathing, good air exchange, no wheezing  Cardiovascular: LVAD humm  GI: soft, non-distended, non-tender, no masses palpated  Skin: no bruising or bleeding  Neurologic: Awake, alert, oriented to name, place and time.  Cranial nerves II-XII are grossly intact.         Data   Recent Labs   Lab Test 03/27/23  0749 03/27/23  0524 03/26/23  2154   NA  --  130* 129*  129*   POTASSIUM  --  3.7 3.7   CHLORIDE  --  87* 87*   CO2  --  34* 31*   ANIONGAP  --  9 11   GLC 92 111* 108*   BUN  --  21.5* 21.6*   CR  --  1.05 0.93   TONY  --  8.5* 8.7       Lab Results   Component Value Date    WBC 20.3 03/26/2023    WBC 9.8 11/20/2019     Lab Results   Component Value Date    RBC 3.69 03/26/2023    RBC 4.70 11/20/2019     Lab Results   Component Value Date    HGB 11.1 03/26/2023    HGB 14.3 11/20/2019     Lab Results   Component Value Date    HCT 34.9 03/26/2023    HCT 45.3 11/20/2019     No components found for: MCT  Lab Results   Component Value Date    MCV 95 03/26/2023    MCV 96 11/20/2019     Lab Results   Component Value Date    MCH 30.1 03/26/2023    MCH 30.4 11/20/2019     Lab Results   Component Value Date    MCHC 31.8 03/26/2023    MCHC 31.6 11/20/2019     Lab Results   Component Value Date    RDW 14.6 03/26/2023    RDW 13.0 11/20/2019     Lab Results   Component Value Date     03/27/2023     11/20/2019        Liver Function Studies -   Recent Labs   Lab Test 03/27/23  0524   PROTTOTAL 4.9*   ALBUMIN 2.6*   BILITOTAL 1.6*   ALKPHOS 80   AST 68*   ALT 24       Venous Blood Gas  Recent Labs   Lab 03/26/23  1135 03/26/23  0400 03/25/23  2340 03/25/23  2047   PHV 7.43 7.42 7.40 7.38   PCO2V 54* 57* 54* 60*   PO2V 43 38 39 30   HCO3V 35* 37* 34* 35*   SAUNDRA 9.4* 10.3* 7.4* 8.1*   O2PER 3 3 4 4       Arterial Blood Gas  Recent Labs   Lab 03/26/23  1135 03/26/23  0403  03/25/23  2340 03/25/23  2047 03/24/23 2006 03/24/23  1629 03/24/23  0750 03/24/23  0618 03/24/23  0356 03/24/23  0341 03/23/23  2354   PH  --   --   --   --   --  7.42  --  7.46* 7.45  --  7.44   PCO2  --   --   --   --   --  42  --  38 39  --  40   PO2  --   --   --   --   --  95  --  85 89  --  137*   HCO3  --   --   --   --   --  27  --  27 27  --  27   O2PER 3 3 4 4   < > 30   < > 30 30   < > 35    < > = values in this interval not displayed.          Recent Results (from the past 24 hour(s))   XR Chest Port 1 View    Narrative    Portal chest    INDICATION: Post tricuspid valve replacement and LVAD insertion    COMPARISON: Yesterday    FINDINGS: Heart size upper normal. Pericardial drain and left  thoracostomy tube present. Left subclavian transvenous approach  implantable cardiac defibrillator. LVAD. No ectopic air collection.  Continued mild prominence of retrocardiac density an left perihilar  haziness.    Impression continued edema/atelectasis in the lungs in this  postsurgical chest patient.    FANNY MELARA MD         SYSTEM ID:  [id number]       Blood culture:  Results for orders placed or performed during the hospital encounter of 03/15/23   Blood Culture Hand, Right    Specimen: Hand, Right; Blood   Result Value Ref Range    Culture No Growth    Blood Culture Line, venous    Specimen: Line, venous; Blood   Result Value Ref Range    Culture No Growth       Urine culture:  No results found for this or any previous visit.    Assessment & Plan    57 year old man with past medical history of HFrEF (EF 10% 5/2022) 2/2 NICM s/p ICD, tobacco use disorder, marijuana use, HTN, lumbar radiculopathy, CKD who presents after being found down at home. Found to have had a prolonged episode of VT with concern for cardiac arrest s/p ROSC in the field now s/p LVAD with HM3 on 03/23/23 and tricuspid annuloplasty.      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 Presented with out of hospital cardiac arrest with VT for 5 min 43 seconds of VT at 199 bpm, with spontaneous conversion to sinus rhythm without defibrillation on device interrogation on 3/15/2023.    - VT secondary prevention ICD in place  - Volume status: hypervolemic, continue diuresing bumex 2mg IV BID today   - BB: Hold PTA carvedilol 12.5 mg BID   - ACEi/ARB/ARNI: Captopril 6.25 mg TID   - MRA: continue spironolactone 12.5 mg daily  - SGLT2i: none PTA  - SCD ppx/BiV pacer: ICD, reprogrammed device to provide ATP burst at VT threshold  - Heparin and coumadin when safe from surgical perspective  - Continue digoxin 250 mcg daily, may need to go back on amiodarone if rates are not controlled while on the floor, currently he is doing fine with rates in the 100s      Patient evaluated with Dr. Ceballos.     Marco Santoyo MD  Cardiology Fellow  [phone]

## 2023-03-27 NOTE — PROGRESS NOTES
VAD EDUCATION PLAN for Akshat Fragoso  Caregivers: Cheryl (wife), Oneil (son)    Pre-Study  1. Please sign up for the TEXT videos. Text the word LIFE to [phone]. A new, short video will be sent to your phone daily. These short videos are a good introduction and a good review.  2. Also, please go to the website to watch all of the HeartMate 3 education videos.  a. Go to: [email or web address]  b. Click:  I am a patient   c. Click:  Living with a left ventricular heart device   d. Scroll down to  Heartmate 3 LVAD Patient Education Program   e. Watch each of the HeartMate 3 videos.   f. Please do not watch Chapter 11 (Driveline Dressing Change)  g. Do not watch the HeartMate 2 videos.  3. The VAD binder and the folder will be delivered to [facility] room. Please review these materials   4. Please review the large patient manual. There are two of these manuals in [facility] room.     VAD Education Session  1. We will plan to do between five to ten 2 hour in-person education sessions. I will set up times for weekdays during normal business hours.   2. In-Person, we will teach two people. If other people want to participate, they can through the [employer] Video or another video phone [email or web address].     Who Will Learn What:  1. All of the VAD Material including the Emergency Controller Change Procedure: Cheryl Oden Sam  2. The Sterile Dressing Change: Oneil Luna  3. Taking the Test: Cheryl Oden Sam Douglas Berg-Williams RN VAD Coordinator - Education  [email or web address]   [phone]    Miroslava Lobato RN VAD Coordinator - Primary  [email or web address]   [phone]    24/7 On-Call VAD Coordinator  [phone], option 4, ask to talk to the VAD Coordinator on-Call. The  will page the on-Call VAD Coordinator. The On-call VAD Coordinator will call you back within 5 or 10 minutes.

## 2023-03-27 NOTE — PROGRESS NOTES
Cardiovascular Surgery Progress Note  03/27/2023         Assessment and Plan:     57 year old male with PMH of HFrEF (10%) secondary to NICM (suspected viral etiology) s/p ICD, chronic tobacco/marijuana use, COPD, HTN, CKD stage III, who presented to [facility] on 3/15 after being found down from a VT arrest. CPR was performed by family. Spontaneously converted to NSR without defibrillation. Down time suspected to be ~ 6 minutes. Presents to [facility] for LVAD placement with preop IABP by Dr. Matias on 3/23/23. He is now s/p LVAD (Heartmate III) placement, TVR, and PFO repair with Dr. Matias and Dr. Waller 3/23/22.    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 RV is mildly dilated and RV function is probably normal on milrinone with moderate to severe TR (there is reversal of hepatic venous flow). There is a swan in place which may be inhibiting coaptation. Trace AI. Mild MR. No MS. There is a continuous left to right shunt across a PFO.  Post-bypass JOAN: No AI. The aortic valve opens 1:3 cardiac cycles. No residual PFO. Atrial septum bulges into the left atrium. TR ring, mild to moderate TR. RV function mildly reduced on epi/norepi/vaso nitric. Inflow cannula is well positioned with low velocities. There is no visible aortic dissection.  Few short runs of NSVT vs afib vs SVT of overnight, asymptomatic and HD stable. MAPS 70s  -discussed with cardiology -- they will plan to review telemetry tomorrow 3/28 and consider amiodarone for NSVT  Most recent echo showed LV EF 10%  - ASA 81 mg daily  - BB not indicated  - captopril 6.25 mg BID  -  digoxin 250 mcg daily  - spironolactone 12.5 mg daily  - Diuresis as below    Chest tubes: L pleural removed 3/27 (no output past 24 hours), mediastinals x2 remain  TPW: NA    Pulmonary:  Extubated POD 1 to 4 lpm via NC. Now saturating well on 2 lpm.   - Supplemental O2 PRN to keep sats > 92%. Wean off as tolerated.  - Pulm hygiene, IS, activity and deep breathing    Neurology / MSK:  Acute post-operative pain   Pain well controlled with current regimen:  - Scheduled: Tylenol   - PRN: Hydromorphone, Oxycodone     / Renal / Fluid / Electrolytes:  Hyponatremia, likely due to volume overload -- diuresis as below  Most recent creatinine 0.96; adequate UOP.   FLUID STATUS: Pre-op weight 172 lb, weight today 184 lb; I/O past 24 hours: -2.9L. CXR shows pulm edema and atelectasis   - Diuresis IV Bumex 2 mg BID per cardiology   - Replete lytes per protocol  - Strict I/O, daily weights  - Avoid/limit nephrotoxins as able    GI / Nutrition:   Elevated LFTs, stable  - Tolerating regular diet  - Continue bowel regimen, yet to have a BM post-operatively     Endocrine:  Stress induced hyperglycemia   Hgb A1c 5.6  - Initially managed on insulin drip postop, transitioned to medium sliding scale; goal BG <180 for optimal healing    Infectious Disease:  Stress induced leukocytosis  WBC 15.3 and trending down, remains afebrile, no signs or symptoms of infection  - Completed perioperative antibiotics  - Continue to monitor fever curve, CBC    Hematology:   Acute blood loss anemia and thrombocytopenia  Hgb 10.7; Plt WNL, no signs or symptoms of active bleeding  - CBC daily    Anticoagulation:   - ASA 81 mg  - Coumadin for LVAD with a goal INR of 2-3, bridging with heparin to therapeutic INR    MSK/Skin:  Sternotomy  Surgical incision  - Sternal precautions  - Incisional cares per protocol    Prophylaxis:   - Stress ulcer prophylaxis: Pantoprazole 40 mg daily for 30 days  - DVT prophylaxis: Subcutaneous heparin, SCD    Disposition:   -  Transferred to  on 3/26  - Therapies recommending discharge to ARU, may progress to home    Discussed with Dr. Florencio Crowe PA-C on 3/27/2023 at 5:23 PM        Interval History:     No overnight events.  States pain is well managed on current regimen. Slept ok overnight.  Tolerating diet, is passing flatus, yet to have a BM post-op. No nausea or vomiting.  Breathing well without complaints.   Working with therapies and ambulating with assistance.   Denies chest pain, palpitations, dizziness, syncopal symptoms, fevers, chills, myalgias, or sternal popping/clicking.         Physical Exam:     Gen: A&Ox4, NAD  Neuro: no focal deficits   CV: RRR, normal S1 S2, no murmurs, rubs or gallops.  +JVD  Pulm: CTA, no wheezing or rhonchi, normal breathing on 3 lpm  Abd: nondistended, normal BS, soft, nontender  Ext: trace peripheral edema  Incision: clean, dry, intact, no erythema, sternum stable  Tubes/drain sites: dressing clean and dry, serosanguinous output, no air leak.         Data:    Imaging:  reviewed recent imaging, no acute concerns    No results found for this or any previous visit (from the past 24 hour(s)).     Labs:  Recent Labs   Lab 03/27/23  0749 03/27/23  0524 03/27/23  0434 03/26/23  2154 03/26/23  2118 03/26/23  1517 03/26/23  1140 03/26/23  1025 03/26/23  0817 03/26/23  0521 03/26/23  0400 03/25/23  1724 03/25/23  1229 03/25/23  0356 03/25/23  0350   WBC  --   --   --   --   --   --   --   --   --   --  20.3*  --  24.1*  --  22.7*   HGB  --   --   --   --   --   --   --   --   --   --  11.1*  --  12.5*  --  11.9*   MCV  --   --   --   --   --   --   --   --   --   --  95  --  94  --  95   PLT  --  198  --   --   --   --   --   --   --   --  146*  --  162  --  144*   INR  --   --  1.40*  --   --   --   --   --   --  1.31*  --   --   --   --  1.31*   NA  --  130*  --  129*  129*  --  130*  --  124*  --   --  128*   < > 129*  --  129*   POTASSIUM  --  3.7  --  3.7  --  4.0  --  3.9  --   --   3.6   < > 3.9  --  4.4   CHLORIDE  --  87*  --  87*  --   --   --  87*  --   --  88*   < > 90*  --  92*   CO2  --  34*  --  31*  --   --   --  30*  --   --  30*   < > 27  --  27   BUN  --  21.5*  --  21.6*  --   --   --  19.7  --   --  20.5*   < > 23.3*  --  25.2*   CR  --  1.05  --  0.93  --   --   --  0.82  --   --  0.65*   < > 0.81  --  1.01   ANIONGAP  --  9  --  11  --   --   --  7  --   --  10   < > 12  --  10   TONY  --  8.5*  --  8.7  --   --   --  8.7  --   --  8.5*   < > 9.2  --  8.9   GLC 92 111*  --  108*   < >  --    < > 139*   < >  --  123*   < > 135*   < > 117*   ALBUMIN  --  2.6*  --  2.7*  --   --   --  2.7*  --   --  2.7*   < > 3.2*  --  2.9*   PROTTOTAL  --  4.9*  --  5.3*  --   --   --  5.0*  --   --  4.9*   < > 5.5*  --  5.1*   BILITOTAL  --  1.6*  --  2.1*  --   --   --  3.4*  --   --  3.7*   < > 4.8*  --  4.0*   ALKPHOS  --  80  --  88  --   --   --  78  --   --  74   < > 84  --  75   ALT  --  24  --  30  --   --   --  31  --   --  32   < > 38  --  36   AST  --  68*  --  84*  --   --   --  99*  --   --  109*   < > 197*  --  211*    < > = values in this interval not displayed.      GLUCOSE:   Recent Labs   Lab 03/27/23  0749 03/27/23  0524 03/26/23 2154 03/26/23 2118 03/26/23  1140 03/26/23  1025   GLC 92 111* 108* 137* 124* 139*

## 2023-03-27 NOTE — PLAN OF CARE
Transfer  Transferred from: 4E  Via:bed  Reason for transfer: Pt appropriate for 6B- improved patient condition  Family: Aware of transfer  Belongings: Received with pt  Chart: Received with pt  Medications: Meds received from old unit with pt  Code Status verified on armband: yes  2 RN Skin Assessment Completed By: Richard SILVER and Lorene SILVER  Med rec completed: yes  Bed surface reassessed with algorithm and charted: yes  New bed surface ordered: no  Suction/Ambu bag/Flowmeter at bedside: yes    Report received from: 4E WILDER Barboza    Neuro: A&Ox4.   Cardiac:  Afib, rates 100-115. MAP 53 upon arrival, but improved, and not new.   Respiratory: Sating 95% on 2L.  GI/: Adequate urine output per muro. Up to commode x1 without BM.  Diet/appetite: Tolerating regular diet. Poor appetite. 2L FR  Activity:  Assist of 2 assist, used ceiling lift to get to commode, then stood and pivoted with 2 assist back to bed, tolerated well.   Pain: At acceptable level on current regimen. Oxycodone given x1.  Skin: No new deficits noted.  LDA's: PIV x2 intact and infusing. Heparin gtt at 1400. 4 CT in place upon arrival to -20 suction. HM 3 intact without alarms. Muro intact.     Plan: Continue with POC. Notify primary team with changes.

## 2023-03-27 NOTE — PLAN OF CARE
Goal Outcome Evaluation:      Plan of Care Reviewed With: patient    Overall Patient Progress: improving    Neuro: A&Ox4. Makes needs known.  Cardiac: Since 11pm, patient has been in and out of ST, SVT, and Afib with ST being the baseline rhythm. Team was notified at 3am of this as well as the patients 10 beat run of vtach. No orders were given, but amio was going to be brought up to day team. Maps were 70s overnight and all LVAD numbers were normal.   Respiratory: Sating 90s on 2L NC. Shallow breathing d/t pain and incision.   GI/: Adequate urine output. No BM yet but passing gas.   Diet/appetite: Tolerating reg diet. Eating well.  Activity: Heavy assist of 2 up to commode last night. Moved well but was painful for pt.   Pain: At acceptable level on current regimen. Treating pain well with prn oxy, dilaudid, and tylenol.   Skin: No new deficits noted. Very bruised groin from previous line.   LDA's: 2 L PIV. 4 CT.     Plan: Continue with POC. Notify primary team with changes. CT removal to be considered today.     Pt status:    Temp:  [97.8  F (36.6  C)-98.7  F (37.1  C)] 98.7  F (37.1  C)  Pulse:  [] 89  Resp:  [16-35] 16  BP: ()/(47-80) 88/76  MAP:  [73 mmHg-88 mmHg] 73 mmHg  Arterial Line BP: (75-92)/(69-81) 75/69  SpO2:  [91 %-100 %] 97 %

## 2023-03-27 NOTE — PLAN OF CARE
Neuro: A&Ox4.   Cardiac: Sinus/sinus tach. VSS- doppler MAPs.   Respiratory: Sating >90% on RA.  GI/: Adequate urine output-muro.No BM  Diet/appetite: Tolerating 2gNa diet. Eating well.  Activity:  Assist of 2, up to chair today.   Pain: Severe rib pain - given Oxy, Dilaudid, robaxin and tylenol.   Skin: No new deficits noted.  LDA's: PIVx2, LVAD, muro    Plan: Continue with POC. Notify primary team with changes.

## 2023-03-28 ENCOUNTER — APPOINTMENT (OUTPATIENT)
Dept: CARDIOLOGY | Facility: CLINIC | Age: 58
DRG: 001 | End: 2023-03-28
Attending: STUDENT IN AN ORGANIZED HEALTH CARE EDUCATION/TRAINING PROGRAM
Payer: COMMERCIAL

## 2023-03-28 ENCOUNTER — APPOINTMENT (OUTPATIENT)
Dept: OCCUPATIONAL THERAPY | Facility: CLINIC | Age: 58
DRG: 001 | End: 2023-03-28
Attending: STUDENT IN AN ORGANIZED HEALTH CARE EDUCATION/TRAINING PROGRAM
Payer: COMMERCIAL

## 2023-03-28 ENCOUNTER — APPOINTMENT (OUTPATIENT)
Dept: GENERAL RADIOLOGY | Facility: CLINIC | Age: 58
DRG: 001 | End: 2023-03-28
Attending: INTERNAL MEDICINE
Payer: COMMERCIAL

## 2023-03-28 ENCOUNTER — APPOINTMENT (OUTPATIENT)
Dept: GENERAL RADIOLOGY | Facility: CLINIC | Age: 58
DRG: 001 | End: 2023-03-28
Attending: PHYSICIAN ASSISTANT
Payer: COMMERCIAL

## 2023-03-28 LAB
ALBUMIN SERPL BCG-MCNC: 2.5 G/DL (ref 3.5–5.2)
ALP SERPL-CCNC: 84 U/L (ref 40–129)
ALT SERPL W P-5'-P-CCNC: 25 U/L (ref 10–50)
ANION GAP SERPL CALCULATED.3IONS-SCNC: 9 MMOL/L (ref 7–15)
AST SERPL W P-5'-P-CCNC: 57 U/L (ref 10–50)
ATRIAL RATE - MUSE: 163 BPM
ATRIAL RATE - MUSE: NORMAL BPM
BILIRUB SERPL-MCNC: 1.2 MG/DL
BUN SERPL-MCNC: 17.2 MG/DL (ref 6–20)
CALCIUM SERPL-MCNC: 8.4 MG/DL (ref 8.6–10)
CHLORIDE SERPL-SCNC: 87 MMOL/L (ref 98–107)
CREAT SERPL-MCNC: 0.93 MG/DL (ref 0.67–1.17)
DEPRECATED HCO3 PLAS-SCNC: 38 MMOL/L (ref 22–29)
DIASTOLIC BLOOD PRESSURE - MUSE: NORMAL MMHG
ERYTHROCYTE [DISTWIDTH] IN BLOOD BY AUTOMATED COUNT: 14.7 % (ref 10–15)
GFR SERPL CREATININE-BSD FRML MDRD: >90 ML/MIN/1.73M2
GLUCOSE BLDC GLUCOMTR-MCNC: 140 MG/DL (ref 70–99)
GLUCOSE BLDC GLUCOMTR-MCNC: 141 MG/DL (ref 70–99)
GLUCOSE BLDC GLUCOMTR-MCNC: 95 MG/DL (ref 70–99)
GLUCOSE BLDC GLUCOMTR-MCNC: 98 MG/DL (ref 70–99)
GLUCOSE SERPL-MCNC: 98 MG/DL (ref 70–99)
HCT VFR BLD AUTO: 36.2 % (ref 40–53)
HGB BLD-MCNC: 11.5 G/DL (ref 13.3–17.7)
HOLD SPECIMEN: NORMAL
INR PPP: 2.18 (ref 0.85–1.15)
INTERPRETATION ECG - MUSE: NORMAL
LVEF ECHO: NORMAL
MCH RBC QN AUTO: 29.9 PG (ref 26.5–33)
MCHC RBC AUTO-ENTMCNC: 31.8 G/DL (ref 31.5–36.5)
MCV RBC AUTO: 94 FL (ref 78–100)
P AXIS - MUSE: NORMAL DEGREES
PHOSPHATE SERPL-MCNC: 2.8 MG/DL (ref 2.5–4.5)
PLATELET # BLD AUTO: 226 10E3/UL (ref 150–450)
POTASSIUM SERPL-SCNC: 3.6 MMOL/L (ref 3.4–5.3)
PR INTERVAL - MUSE: NORMAL MS
PROT SERPL-MCNC: 5 G/DL (ref 6.4–8.3)
QRS DURATION - MUSE: 128 MS
QRS DURATION - MUSE: 182 MS
QRS DURATION - MUSE: 90 MS
QRS DURATION - MUSE: 96 MS
QT - MUSE: 264 MS
QT - MUSE: 356 MS
QT - MUSE: 416 MS
QT - MUSE: 480 MS
QTC - MUSE: 389 MS
QTC - MUSE: 418 MS
QTC - MUSE: 488 MS
QTC - MUSE: 567 MS
R AXIS - MUSE: -40 DEGREES
R AXIS - MUSE: 158 DEGREES
R AXIS - MUSE: 223 DEGREES
R AXIS - MUSE: 264 DEGREES
RBC # BLD AUTO: 3.84 10E6/UL (ref 4.4–5.9)
SODIUM SERPL-SCNC: 131 MMOL/L (ref 136–145)
SODIUM SERPL-SCNC: 133 MMOL/L (ref 136–145)
SODIUM SERPL-SCNC: 134 MMOL/L (ref 136–145)
SODIUM SERPL-SCNC: 134 MMOL/L (ref 136–145)
SYSTOLIC BLOOD PRESSURE - MUSE: NORMAL MMHG
T AXIS - MUSE: 113 DEGREES
T AXIS - MUSE: 13 DEGREES
T AXIS - MUSE: 130 DEGREES
T AXIS - MUSE: 95 DEGREES
UFH PPP CHRO-ACNC: 0.29 IU/ML
VENTRICULAR RATE- MUSE: 151 BPM
VENTRICULAR RATE- MUSE: 72 BPM
VENTRICULAR RATE- MUSE: 83 BPM
VENTRICULAR RATE- MUSE: 84 BPM
WBC # BLD AUTO: 13.8 10E3/UL (ref 4–11)

## 2023-03-28 PROCEDURE — 93306 TTE W/DOPPLER COMPLETE: CPT | Mod: 26 | Performed by: INTERNAL MEDICINE

## 2023-03-28 PROCEDURE — 250N000013 HC RX MED GY IP 250 OP 250 PS 637: Performed by: STUDENT IN AN ORGANIZED HEALTH CARE EDUCATION/TRAINING PROGRAM

## 2023-03-28 PROCEDURE — 36415 COLL VENOUS BLD VENIPUNCTURE: CPT | Performed by: PHYSICIAN ASSISTANT

## 2023-03-28 PROCEDURE — 71045 X-RAY EXAM CHEST 1 VIEW: CPT

## 2023-03-28 PROCEDURE — 93005 ELECTROCARDIOGRAM TRACING: CPT

## 2023-03-28 PROCEDURE — 93750 INTERROGATION VAD IN PERSON: CPT | Performed by: INTERNAL MEDICINE

## 2023-03-28 PROCEDURE — 120N000003 HC R&B IMCU UMMC

## 2023-03-28 PROCEDURE — 250N000011 HC RX IP 250 OP 636

## 2023-03-28 PROCEDURE — 99233 SBSQ HOSP IP/OBS HIGH 50: CPT | Mod: 25 | Performed by: INTERNAL MEDICINE

## 2023-03-28 PROCEDURE — 71045 X-RAY EXAM CHEST 1 VIEW: CPT | Mod: 26 | Performed by: RADIOLOGY

## 2023-03-28 PROCEDURE — 250N000011 HC RX IP 250 OP 636: Performed by: STUDENT IN AN ORGANIZED HEALTH CARE EDUCATION/TRAINING PROGRAM

## 2023-03-28 PROCEDURE — 85520 HEPARIN ASSAY: CPT | Performed by: INTERNAL MEDICINE

## 2023-03-28 PROCEDURE — 80053 COMPREHEN METABOLIC PANEL: CPT | Performed by: PHYSICIAN ASSISTANT

## 2023-03-28 PROCEDURE — 250N000013 HC RX MED GY IP 250 OP 250 PS 637: Performed by: INTERNAL MEDICINE

## 2023-03-28 PROCEDURE — 84295 ASSAY OF SERUM SODIUM: CPT | Performed by: PHYSICIAN ASSISTANT

## 2023-03-28 PROCEDURE — 999N000157 HC STATISTIC RCP TIME EA 10 MIN

## 2023-03-28 PROCEDURE — 93282 PRGRMG EVAL IMPLANTABLE DFB: CPT

## 2023-03-28 PROCEDURE — 93306 TTE W/DOPPLER COMPLETE: CPT

## 2023-03-28 PROCEDURE — 84100 ASSAY OF PHOSPHORUS: CPT | Performed by: INTERNAL MEDICINE

## 2023-03-28 PROCEDURE — 85027 COMPLETE CBC AUTOMATED: CPT | Performed by: PHYSICIAN ASSISTANT

## 2023-03-28 PROCEDURE — 93010 ELECTROCARDIOGRAM REPORT: CPT | Mod: 59 | Performed by: INTERNAL MEDICINE

## 2023-03-28 PROCEDURE — 258N000003 HC RX IP 258 OP 636: Performed by: STUDENT IN AN ORGANIZED HEALTH CARE EDUCATION/TRAINING PROGRAM

## 2023-03-28 PROCEDURE — 36415 COLL VENOUS BLD VENIPUNCTURE: CPT | Performed by: INTERNAL MEDICINE

## 2023-03-28 PROCEDURE — 93282 PRGRMG EVAL IMPLANTABLE DFB: CPT | Mod: 26 | Performed by: INTERNAL MEDICINE

## 2023-03-28 PROCEDURE — 85610 PROTHROMBIN TIME: CPT | Performed by: STUDENT IN AN ORGANIZED HEALTH CARE EDUCATION/TRAINING PROGRAM

## 2023-03-28 PROCEDURE — 71045 X-RAY EXAM CHEST 1 VIEW: CPT | Mod: 77

## 2023-03-28 PROCEDURE — 250N000013 HC RX MED GY IP 250 OP 250 PS 637

## 2023-03-28 PROCEDURE — 97535 SELF CARE MNGMENT TRAINING: CPT | Mod: GO

## 2023-03-28 RX ORDER — ADENOSINE 3 MG/ML
6 INJECTION, SOLUTION INTRAVENOUS ONCE
Status: COMPLETED | OUTPATIENT
Start: 2023-03-28 | End: 2023-03-28

## 2023-03-28 RX ORDER — LISINOPRIL 2.5 MG/1
2.5 TABLET ORAL DAILY
Status: DISCONTINUED | OUTPATIENT
Start: 2023-03-28 | End: 2023-04-09 | Stop reason: HOSPADM

## 2023-03-28 RX ORDER — ADENOSINE 3 MG/ML
INJECTION, SOLUTION INTRAVENOUS
Status: COMPLETED
Start: 2023-03-28 | End: 2023-03-28

## 2023-03-28 RX ORDER — POTASSIUM CHLORIDE 1.5 G/1.58G
40 POWDER, FOR SOLUTION ORAL ONCE
Status: COMPLETED | OUTPATIENT
Start: 2023-03-28 | End: 2023-03-28

## 2023-03-28 RX ORDER — BUMETANIDE 0.25 MG/ML
3 INJECTION INTRAMUSCULAR; INTRAVENOUS
Status: DISCONTINUED | OUTPATIENT
Start: 2023-03-28 | End: 2023-03-28

## 2023-03-28 RX ORDER — WARFARIN SODIUM 1 MG/1
1 TABLET ORAL
Status: COMPLETED | OUTPATIENT
Start: 2023-03-28 | End: 2023-03-28

## 2023-03-28 RX ORDER — ADENOSINE 3 MG/ML
12 INJECTION, SOLUTION INTRAVENOUS ONCE
Status: COMPLETED | OUTPATIENT
Start: 2023-03-28 | End: 2023-03-28

## 2023-03-28 RX ADMIN — OXYCODONE HYDROCHLORIDE 10 MG: 10 TABLET ORAL at 15:12

## 2023-03-28 RX ADMIN — ADENOSINE 6 MG: 3 INJECTION, SOLUTION INTRAVENOUS at 06:45

## 2023-03-28 RX ADMIN — BUMETANIDE 3 MG: 0.25 INJECTION, SOLUTION INTRAMUSCULAR; INTRAVENOUS at 16:07

## 2023-03-28 RX ADMIN — ACETAMINOPHEN 975 MG: 325 TABLET, FILM COATED ORAL at 15:03

## 2023-03-28 RX ADMIN — AMIODARONE HYDROCHLORIDE 0.5 MG/MIN: 50 INJECTION, SOLUTION INTRAVENOUS at 11:04

## 2023-03-28 RX ADMIN — OXYCODONE HYDROCHLORIDE 10 MG: 10 TABLET ORAL at 20:32

## 2023-03-28 RX ADMIN — Medication 6.25 MG: at 06:04

## 2023-03-28 RX ADMIN — EMPAGLIFLOZIN 10 MG: 10 TABLET, FILM COATED ORAL at 15:03

## 2023-03-28 RX ADMIN — ADENOSINE 12 MG: 3 INJECTION, SOLUTION INTRAVENOUS at 06:49

## 2023-03-28 RX ADMIN — SPIRONOLACTONE 12.5 MG: 25 TABLET, FILM COATED ORAL at 09:29

## 2023-03-28 RX ADMIN — LISINOPRIL 2.5 MG: 2.5 TABLET ORAL at 15:03

## 2023-03-28 RX ADMIN — BUMETANIDE 3 MG: 0.25 INJECTION, SOLUTION INTRAMUSCULAR; INTRAVENOUS at 09:29

## 2023-03-28 RX ADMIN — DIGOXIN 250 MCG: 125 TABLET ORAL at 08:25

## 2023-03-28 RX ADMIN — OXYCODONE HYDROCHLORIDE 10 MG: 10 TABLET ORAL at 10:36

## 2023-03-28 RX ADMIN — ACETAMINOPHEN 975 MG: 325 TABLET, FILM COATED ORAL at 22:27

## 2023-03-28 RX ADMIN — OXYCODONE HYDROCHLORIDE 10 MG: 10 TABLET ORAL at 00:14

## 2023-03-28 RX ADMIN — PANTOPRAZOLE SODIUM 40 MG: 40 TABLET, DELAYED RELEASE ORAL at 08:24

## 2023-03-28 RX ADMIN — GABAPENTIN 300 MG: 300 CAPSULE ORAL at 08:25

## 2023-03-28 RX ADMIN — INSULIN ASPART 1 UNITS: 100 INJECTION, SOLUTION INTRAVENOUS; SUBCUTANEOUS at 08:14

## 2023-03-28 RX ADMIN — WARFARIN SODIUM 1 MG: 1 TABLET ORAL at 18:10

## 2023-03-28 RX ADMIN — GABAPENTIN 300 MG: 300 CAPSULE ORAL at 20:32

## 2023-03-28 RX ADMIN — GABAPENTIN 300 MG: 300 CAPSULE ORAL at 15:03

## 2023-03-28 RX ADMIN — ACETAMINOPHEN 975 MG: 325 TABLET, FILM COATED ORAL at 06:03

## 2023-03-28 RX ADMIN — OXYCODONE HYDROCHLORIDE 10 MG: 10 TABLET ORAL at 06:30

## 2023-03-28 RX ADMIN — ASPIRIN 81 MG 81 MG: 81 TABLET ORAL at 08:25

## 2023-03-28 RX ADMIN — HYDROMORPHONE HYDROCHLORIDE 0.2 MG: 0.2 INJECTION, SOLUTION INTRAMUSCULAR; INTRAVENOUS; SUBCUTANEOUS at 10:12

## 2023-03-28 RX ADMIN — POTASSIUM CHLORIDE 40 MEQ: 1.5 POWDER, FOR SOLUTION ORAL at 06:34

## 2023-03-28 RX ADMIN — HYDROMORPHONE HYDROCHLORIDE 0.2 MG: 0.2 INJECTION, SOLUTION INTRAMUSCULAR; INTRAVENOUS; SUBCUTANEOUS at 22:10

## 2023-03-28 ASSESSMENT — ACTIVITIES OF DAILY LIVING (ADL)
ADLS_ACUITY_SCORE: 26

## 2023-03-28 NOTE — PLAN OF CARE
"BP 92/71 (BP Location: Left arm, Patient Position: Chair)   Pulse 95   Temp 98.5  F (36.9  C) (Oral)   Resp 19   Ht 1.702 m (5' 7\")   Wt 83.8 kg (184 lb 11.9 oz)   SpO2 96%   BMI 28.94 kg/m      Shift: 2300 - 0730  VS: Stable on 2 L nasal cannula, afebrile  Cardiac: Telemetry monitoring in progress, rhythm has been sinus with a bundle branch block. LVAD numbers within parameters, no alarms this shift. Blood pressures obtained via doppler, within goal range.  Neuro: AOx4  BG: ACHS (98)  Labs: Mag, K, and Phos protocols ran (K replaced); WBCs: 13.8, Hgb: 11.5, Hct: 36.2, HepXa: 0.29 (heparin protocol ran, recheck tomorrow AM), Na: 134  Respiratory: Breathing is shallow at times due to rib pain, chest expansion is symmetrical. Pt has an infrequent weak cough. Lung sounds are coarse throughout with diminished bases bilaterally. No air leak detectable in chest tubes.  Pain/Nausea/PRN: Pt denies nausea. PRN oxycodone x2 along with scheduled tylenol for rib pain.  Diet: Regular, 2 L fluid restriction (1760/2000 mL remaining for today)  LDA: L PIV (SL), L PIV (Infusing heparin gtt @ 1400 units/hr), Chest tubes x3 (serosanguineous output), Muro catheter   GI/: Indwelling muro catheter, LBM: 3/27  Skin: No new findings this shift  Mobility: Assist x2  Plan: VAD coordinator will come to go over education with the patient and family members this afternoon. Encourage activity and use of IS while awake.    Author called rapid response this AM d/t pt arrythmia (SVT vs VTach vs NST). CARDS 2 provider called (provider stated that CVTS was the pt's primary team, author was unable to contact them and called RRT) and conditional EKG order released. Rapid response team and provider arrived to the room and discussed the pt's condition. Provider asked author to administer pain medications and K replacement. Provider ordered IV adenosine. One 6mg dose of adenosine administered by rapid response RN, pt's rhythm was not converted " (EKG taken to confirm). Another 12 mg dose of adenosine was administered by rapid response RN, with good results in converting pt's rhythm (EKG completed to assess). Pt's heart rate is now 70s-100s, provider gave author a verbal order to page if the pt's HR is over 130 (Pt's need for amiodarone gtt will then be assessed at that time).    Will give report to oncoming nurse. Pt left in stable condition, care relinquished at this time.

## 2023-03-28 NOTE — PROGRESS NOTES
LVAD Social Work Services Progress Note      Date of Initial Social Work Evaluation: 3/17/2023  Collaborated with: Akshat    Data: Akshat was transferred out of ICU and onto 6B. He is progressing after LVAD implant and was starting VAD education this afternoon with his family.  Intervention: Met briefly with Akshat to check in and provide supportive visit. Inquired into questions/concerns and assessed coping.  Assessment: Akshat was talkative. Stating that he has had good days and days that haven't been that good due to some rhythm issues happening with his heart. He displayed some discouragement, but coping seems appropriate. No concerns noted today.  Education provided by SW: Ongoing SW availability  Plan:    Discharge Plans in Progress: TBD-[facility] TCU/ARU vs. Home with family    Barriers to d/c plan: Medical condition    Follow up Plan: SW will continue to follow for ongoing psychosocial support post-VAD implant and assistance with discharge planning.

## 2023-03-28 NOTE — PROVIDER NOTIFICATION
03/28/23 0700   Call Information   Date of Call 03/28/23   Time of Call 0622   Name of person requesting the team Leanne SAUL   Title of person requesting team RN   RRT Arrival time 0625   Time RRT ended 0705   Reason for call   Type of RRT Adult   Primary reason for call Cardiovascular   Cardiovascular EKG changes  (SVT 140s)   Was patient transferred from the ED, ICU, or PACU within last 24 hours prior to RRT call? No   SBAR   Situation SVT 140s   Background PMH of HFrEF (10%) secondary to NICM (suspected viral etiology) s/p ICD, chronic tobacco/marijuana use, COPD, HTN, CKD stage III, who presented to [facility] on 3/15 after being found down from a VT arrest. CPR was performed by family. Spontaneously converted to NSR without defibrillation. Down time suspected to be ~ 6 minutes. Presents to [facility] for LVAD placement with preop IABP by Dr. Matias on 3/23/23. He is now s/p LVAD (Heartmate III) placement, TVR, and PFO repair   Notable History/Conditions Cardiac;COPD   Assessment A+Ox4, SVT 140s, doppler BP 70s   Interventions Meds;ECG  (adenosine given.)   Patient Outcome   Patient Outcome Stabilized on unit   RRT Team   Attending/Primary/Covering Physician Cards 2   Date Attending Physician notified 03/28/23   Time Attending Physician notified 0622   Physician(s) Dilip Hassan MD   Lead WILDER SAUL   RT n/a   Post RRT Intervention Assessment   Post RRT Assessment Stable/Improved   Date Follow Up Done 03/28/23   Time Follow Up Done 0855

## 2023-03-28 NOTE — CODE/RAPID RESPONSE
Rapid Response Team Note    Assessment   In assessment a rapid response was called on Akshat Fragoso due to tachyarrythmia . HR in 140s. Patient had VTach, out of hospital cardiac arrest PTA and has been having been having runs of NSVT vs afib vs SVT after admission in setting of Acute on chronic HFrEF (EF 10% 2022) 2/2 NICM S/p LVAD (Heartmate III) placement, TVR and PFO repair. This presentation is likely due to ongoing NSVT vs afib vs SVT.   Patient is asymptomatic for chest pain, SoB and mental status but endorses palpitation. BP per doppler in 70s    Plan   -  STAT ekg pending  - Cardiology team in room and planning Adenosine vs Amio drip  - monitor electrolytes   -  Disposition: The patient will remain on the current unit. We will continue to monitor this patient closely.  -  Reassessment and plan follow-up will be performed by the primary team      Genna Hargrove PA-C  [facility] RRT [facility] Job Code Contact #[phone]  [facility] Paging/Directory    Hospital Course   Brief Summary of events leading to rapid response:   57 year old male with PMH of HFrEF (10%) secondary to NICM (suspected viral etiology) s/p ICD, chronic tobacco/marijuana use, COPD, HTN, CKD stage III, who presented to [facility] on 3/15 after being found down from a VT arrest. CPR was performed by family. Spontaneously converted to NSR without defibrillation. Down time suspected to be ~ 6 minutes. Presents to [facility] for LVAD placement with preop IABP by Dr. Matias on 3/23/23. He is now s/p LVAD (Heartmate III) placement, TVR, and PFO repair  RRT called for another run of tachyarrhythmia     Admission Diagnosis:   Syncope and collapse [R55]  Ventricular tachycardia (H) [I47.20]    Physical Exam   Temp: 98.5  F (36.9  C) Temp  Min: 98.2  F (36.8  C)  Max: 98.8  F (37.1  C)  Resp: 19 Resp  Min: 16  Max: 20  SpO2: 96 % SpO2  Min: 92 %  Max: 100 %  Pulse: 95 Pulse  Min: 94  Max: 111    No data recorded  BP: 92/71 Systolic (24hrs), Av , Min:84 , Max:93    Diastolic (24hrs), Av, Min:64, Max:76     I/Os: I/O last 3 completed shifts:  In: 1735.87 [P.O.:1340; I.V.:395.87]  Out: 2930 [Urine:2800; Chest Tube:130]     Exam:   General: in no acute distress  Mental Status: AAOx4.  HEENT- Moist mucous membranes  Respiratory- Clear to auscultation bilaterally anteriorly, no crackles or wheezing.  Cardiovascular- Tachycardic, normal S1 and S2, and no murmur noted.  GI: Soft, non-distended, non-tender, normal bowel sounds.      Significant Results and Procedures   Lactic Acid:   Recent Labs   Lab Test 23  0916 23  0350 23  0001   LACT 1.2 1.5 1.9     CBC:   Recent Labs   Lab Test 23  0357 23  0524 23  0400   WBC 13.8* 15.3* 20.3*   HGB 11.5* 10.7* 11.1*   HCT 36.2* 34.3* 34.9*    198  198 146*

## 2023-03-28 NOTE — PLAN OF CARE
Neuro: A&Ox4.   Cardiac: Sinus tachycardia with bundle branch block. VSS, doppler MAPs. HM3 parameters WDL, no alarms.  Respiratory: Sating lower 90s on RA-2 L NC.  GI/: Urine output adequate via muro. BM X1, loose.  Diet/appetite: Tolerating 2g Na diet with 2 L fluid restriction. Eating encouraged.  Activity:  Assist of 2, up to chair and bathroom.  Pain: Severe rib pain managed with PRN oxy and dilaudid.  Skin: No new deficits noted.  LDA's: 2 left PIV, lower infusing with heparin and upper SL, muro, LVAD, 3 chest tubes.    Plan: Continue with POC. Notify primary team with changes. Encourage IS use and out of bed activity.

## 2023-03-28 NOTE — CONSULTS
Cardiology Progress Note       Changes Today:   Multiple runs of Afib with RVR this morning which were slowed with adenosine  Start amiodarone   Switch to lisinopril  Add jardiance    Physical Exam   Temp: 98.5  F (36.9  C) Temp src: Oral BP: 92/71 Pulse: 95   Resp: 19 SpO2: 96 % O2 Device: Nasal cannula Oxygen Delivery: 2 LPM    Vital Signs with Ranges  Temp:  [98.2  F (36.8  C)-98.8  F (37.1  C)] 98.5  F (36.9  C)  Pulse:  [] 95  Resp:  [16-20] 19  BP: (86-93)/(64-71) 92/71  SpO2:  [92 %-98 %] 96 %      LVAD Settings  Heartmate 3 LEFT VS  Flow (Lpm): 4.2 Lpm  Pulse Index (PI): 4.2 PI  Speed (rpm): 5200 rpm  Power (mari): 3.6 mari  Current Hct settin    Intake/Output    Intake/Output Summary (Last 24 hours) at 3/28/2023 0757  Last data filed at 3/28/2023 0400  Gross per 24 hour   Intake 1555.6 ml   Output 4870 ml   Net -3314.4 ml       Weight  Vitals:    23 0000 23 0400 23 0600 23 0046   Weight: 80.5 kg (177 lb 7.5 oz) 81.1 kg (178 lb 12.7 oz) 82.1 kg (181 lb) 83.5 kg (184 lb 1.4 oz)    23 0514   Weight: 83.8 kg (184 lb 11.9 oz)       Resp: 19        heparin 1,400 Units/hr (23 2243)     BETA BLOCKER NOT PRESCRIBED           acetaminophen  975 mg Oral Q8H HOWIE     aspirin  81 mg Oral or NG Tube Daily     captopril  6.25 mg Oral Q8H HOWIE     digoxin  250 mcg Oral Daily     gabapentin  300 mg Oral or Feeding Tube TID     insulin aspart  1-7 Units Subcutaneous TID AC     insulin aspart  1-5 Units Subcutaneous At Bedtime     lidocaine  1 patch Transdermal Q24H     lidocaine   Transdermal Q8H HOWIE     pantoprazole  40 mg Oral QAM AC     polyethylene glycol  17 g Oral BID     senna-docusate  2 tablet Oral BID     sodium chloride (PF)  3 mL Intracatheter Q8H     spironolactone  12.5 mg Oral Daily     Warfarin Therapy Reminder  1 each Oral See Admin Instructions        , Blood pressure 92/71, pulse 95, temperature 98.5  F (36.9  C), temperature source Oral, resp. rate 19, height  "1.702 m (5' 7\"), weight 83.8 kg (184 lb 11.9 oz), SpO2 96 %.  Constitutional: awake, alert, cooperative, no apparent distress, and appears stated age  Eyes: sclera clear, conjunctiva normal  Respiratory: No increased work of breathing, good air exchange, no wheezing  Cardiovascular: LVAD humm  GI: soft, non-distended, non-tender, no masses palpated  Skin: no bruising or bleeding  Neurologic: Awake, alert, oriented to name, place and time.  Cranial nerves II-XII are grossly intact.            Data   Recent Labs   Lab Test 03/28/23  0357 03/27/23  2215 03/27/23  1932 03/27/23  1541 03/27/23  1319   *  134*  --  130*   < > 130*   POTASSIUM 3.6  --   --   --  3.6   CHLORIDE 87*  --   --   --  88*   CO2 38*  --   --   --  32*   ANIONGAP 9  --   --   --  10   GLC 98 107*  --    < > 146*   BUN 17.2  --   --   --  17.9   CR 0.93  --   --   --  0.96   TONY 8.4*  --   --   --  8.1*    < > = values in this interval not displayed.       Lab Results   Component Value Date    WBC 13.8 03/28/2023    WBC 9.8 11/20/2019     Lab Results   Component Value Date    RBC 3.84 03/28/2023    RBC 4.70 11/20/2019     Lab Results   Component Value Date    HGB 11.5 03/28/2023    HGB 14.3 11/20/2019     Lab Results   Component Value Date    HCT 36.2 03/28/2023    HCT 45.3 11/20/2019     No components found for: MCT  Lab Results   Component Value Date    MCV 94 03/28/2023    MCV 96 11/20/2019     Lab Results   Component Value Date    MCH 29.9 03/28/2023    MCH 30.4 11/20/2019     Lab Results   Component Value Date    MCHC 31.8 03/28/2023    MCHC 31.6 11/20/2019     Lab Results   Component Value Date    RDW 14.7 03/28/2023    RDW 13.0 11/20/2019     Lab Results   Component Value Date     03/28/2023     11/20/2019        Liver Function Studies -   Recent Labs   Lab Test 03/28/23  0357   PROTTOTAL 5.0*   ALBUMIN 2.5*   BILITOTAL 1.2   ALKPHOS 84   AST 57*   ALT 25       Venous Blood Gas  Recent Labs   Lab 03/26/23  1135 " 03/26/23 0400 03/25/23 2340 03/25/23 2047   PHV 7.43 7.42 7.40 7.38   PCO2V 54* 57* 54* 60*   PO2V 43 38 39 30   HCO3V 35* 37* 34* 35*   SAUNDRA 9.4* 10.3* 7.4* 8.1*   O2PER 3 3 4 4       Arterial Blood Gas  Recent Labs   Lab 03/26/23  1135 03/26/23 0400 03/25/23 2340 03/25/23 2047 03/24/23 2006 03/24/23  1629 03/24/23  0750 03/24/23  0618 03/24/23  0356 03/24/23  0341 03/23/23 2354   PH  --   --   --   --   --  7.42  --  7.46* 7.45  --  7.44   PCO2  --   --   --   --   --  42  --  38 39  --  40   PO2  --   --   --   --   --  95  --  85 89  --  137*   HCO3  --   --   --   --   --  27  --  27 27  --  27   O2PER 3 3 4 4   < > 30   < > 30 30   < > 35    < > = values in this interval not displayed.          Recent Results (from the past 24 hour(s))   XR Chest Port 1 View    Narrative    Portal chest    INDICATION: Post tricuspid valve replacement and LVAD insertion    COMPARISON: Yesterday    FINDINGS: Heart size upper normal. Pericardial drain and left  thoracostomy tube present. Left subclavian transvenous approach  implantable cardiac defibrillator. LVAD. No ectopic air collection.  Continued mild prominence of retrocardiac density an left perihilar  haziness.    Impression continued edema/atelectasis in the lungs in this  postsurgical chest patient.    FANNY MELARA MD         SYSTEM ID:  [id number]   XR Chest Port 1 View    Narrative    Portable chest 3/27/2023 at 1211 hours    INDICATION: Post pleural chest tube removal    COMPARISON: 0901 hours earlier today    FINDINGS: Heart upper normal size. LVAD. Implantable cardiac  defibrillator. Pericardial drain. Removal of previous left  thoracostomy tube. No definite pneumothorax. Haziness in the lower  lung fields and obscured left hemidiaphragm an slightly prominent  retrocardiac density all unchanged.      Impression    IMPRESSION: Removal left thoracostomy tube with no interval changes  otherwise.    FANNY MELARA MD         SYSTEM ID:  [id number]        Blood culture:  Results for orders placed or performed during the hospital encounter of 03/15/23   Blood Culture Hand, Right    Specimen: Hand, Right; Blood   Result Value Ref Range    Culture No Growth    Blood Culture Line, venous    Specimen: Line, venous; Blood   Result Value Ref Range    Culture No Growth       Urine culture:  No results found for this or any previous visit.    Assessment & Plan    57 year old man with past medical history of HFrEF (EF 10% 5/2022) 2/2 NICM s/p ICD, tobacco use disorder, marijuana use, HTN, lumbar radiculopathy, CKD who presents after being found down at home. Found to have had a prolonged episode of VT with concern for cardiac arrest s/p ROSC in the field now s/p LVAD with HM3 on 03/23/23 and tricuspid annuloplasty.      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 Presented with out of hospital cardiac arrest with VT for 5 min 43 seconds of VT at 199 bpm, with spontaneous conversion to sinus rhythm without defibrillation on device interrogation on 3/15/2023.    - VT secondary prevention ICD in place  - Volume status: hypervolemic, continue diuresing bumex 2mg IV BID today   - BB: Hold PTA carvedilol 12.5 mg BID   - ACEi/ARB/ARNI: Lisinopril 2.5mg  - MRA: continue spironolactone 12.5 mg daily  - SGLT2i: Empaglaflozin daily   - SCD ppx/BiV pacer: ICD, reprogrammed device to provide ATP burst at VT threshold  - Heparin and coumadin when safe from surgical perspective  - Continue digoxin 250 mcg daily  -Amiodarone low dose infusion no bolus (ordered for  you)     Patient evaluated with Dr. Ceballos.     Marco Santoyo MD  Cardiology Fellow  [phone]

## 2023-03-28 NOTE — PLAN OF CARE
Neuro: A&Ox4.   Cardiac: HM3 LVAD. Amio gtt initiated this AM. Afib - rates fluctuating, controlled this morning but elevated to 120-140s around 1500 this afternoon. Provider notified and amoi gtt increased to 1 mg/min, rates now in the 90s. MAPs 68-80. PIs 2.7 this afternoon intermittently, provider was notified - future IV bumex discontinued.    Respiratory: Sating >90% on RA.  GI/: Adequte urine output via muro - IV bumex given x2 per MAR. BM X1.   Diet/appetite: Tolerating regular diet w/ 2L FR.  Activity:  Assist of 1-2, ambulated to bathroom.   Pain: Oxycodone and dilaudid given per MAR for rib pain.   Skin: No new deficits noted.  LDA's:  L PIV x2 - amio infusing.     Plan: Na+ check Q8hr. Continue with POC. Notify primary team with changes.     Shift updates:  Amio gtt initiated, infusing at 1 mg/min. Heparin gtt discontinued - bridging to warfarin. CTs removed by provider at bedside. IV bumex given x2 per MAR. Echo, EKG, and CXR completed. Managing pain control with prn oxycodone and dilaudid + scheduled tylenol.     Goal Outcome Evaluation:      Plan of Care Reviewed With: patient    Overall Patient Progress: no changeOverall Patient Progress: no change    Outcome Evaluation: Amiodarion gtt infusing. CTs removed and heparin gtt discontinued. Ambulated to bathroom today.      Problem: Plan of Care - These are the overarching goals to be used throughout the patient stay.    Goal: Optimal Comfort and Wellbeing  Intervention: Monitor Pain and Promote Comfort  Recent Flowsheet Documentation  Taken 3/28/2023 1600 by Piper Valdez RN  Pain Management Interventions: rest  Taken 3/28/2023 1500 by Piper Valdez, RN  Pain Management Interventions: medication (see MAR)  Taken 3/28/2023 1036 by Piper Valdez RN  Pain Management Interventions: medication (see MAR)  Taken 3/28/2023 1012 by Piper Valdez RN  Pain Management Interventions: medication (see MAR)  Taken 3/28/2023 0800 by Piper Valdez  RN  Pain Management Interventions: declines     Problem: Heart Failure  Goal: Optimal Cardiac Output  Intervention: Optimize Cardiac Output  Recent Flowsheet Documentation  Taken 3/28/2023 1600 by Piper Valdez RN  Environmental Support: calm environment promoted  Taken 3/28/2023 1200 by Piper Valdez RN  Environmental Support: calm environment promoted  Taken 3/28/2023 0800 by Piper Valdez, RN  Environmental Support: calm environment promoted

## 2023-03-28 NOTE — CONSULTS
Discharge Pharmacy Test Claim    Jardiance is covered through patient's commercial [employer] plan with a copay of $25.    Test Claim Copay   jardiance 25.00       Ilana Painter  [facility] Pharmacy Liaison  Ph: [phone] Pager: [phone]   Securely message with the Vocera Web Console (learn more here)

## 2023-03-28 NOTE — PROGRESS NOTES
Cardiovascular Surgery Progress Note  03/28/2023         Assessment and Plan:     57 year old male with PMH of HFrEF (10%) secondary to NICM (suspected viral etiology) s/p ICD, chronic tobacco/marijuana use, COPD, HTN, CKD stage III, who presented to [facility] on 3/15 after being found down from a VT arrest. CPR was performed by family. Spontaneously converted to NSR without defibrillation. Down time suspected to be ~ 6 minutes. Presents to [facility] for LVAD placement with preop IABP by Dr. Matias on 3/23/23. He is now s/p LVAD (Heartmate III) placement, TVR, and PFO repair with Dr. Matias and Dr. Waller 3/23/22.    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 RV is mildly dilated and RV function is probably normal on milrinone with moderate to severe TR (there is reversal of hepatic venous flow). There is a swan in place which may be inhibiting coaptation. Trace AI. Mild MR. No MS. There is a continuous left to right shunt across a PFO.  Post-bypass JOAN: No AI. The aortic valve opens 1:3 cardiac cycles. No residual PFO. Atrial septum bulges into the left atrium. TR ring, mild to moderate TR. RV function mildly reduced on epi/norepi/vaso nitric. Inflow cannula is well positioned with low velocities. There is no visible aortic dissection.  Few short runs of NSVT vs afib vs SVT of overnight 3/27  Multiple runs of afib with RVR this morning which were slowed down with adenosine given by cards  - amiodarone infusion started 3/28 per cards  HD stable. MAPS 70s  Most recent echo showed LV EF 10%  - ASA 81 mg daily  - BB not indicated  -  captopril 6.25 mg BID  - digoxin 250 mcg daily  - spironolactone 12.5 mg daily  - Diuresis as below    Chest tubes: L pleural removed 3/27 (no output past 24 hours), mediastinals x2 removed 3/28  TPW: NA    Pulmonary:  Extubated POD 1 to 4 lpm via NC. Now saturating well on room air.   - Supplemental O2 PRN to keep sats > 92%. Wean off as tolerated.  - Pulm hygiene, IS, activity and deep breathing    Neurology / MSK:  Acute post-operative pain   Pain well controlled with current regimen:  - Scheduled: Tylenol   - PRN: IV Hydromorphone, PO Oxycodone, methocarbamol      / Renal / Fluid / Electrolytes:  Hyponatremia, improving, likely due to volume overload -- diuresis as below  Most recent creatinine 0.93; adequate UOP.   FLUID STATUS: Pre-op weight 172 lb, weight today 184 lb; I/O past 24 hours: -1.1L. CXR shows pulm edema and atelectasis   - Diuresis IV Bumex 2 mg BID per cardiology   - Replete lytes per protocol  - Strict I/O, daily weights  - Avoid/limit nephrotoxins as able  Urinary retention post-op  - muro in place, recommend removal tomorrow 3/29    GI / Nutrition:   Elevated LFTs, improving  - Tolerating regular diet  - Continue bowel regimen, last BM 3/28    Endocrine:  Stress induced hyperglycemia   Hgb A1c 5.6  - Initially managed on insulin drip postop, transitioned to medium sliding scale; goal BG <180 for optimal healing    Infectious Disease:  Stress induced leukocytosis  WBC 13.8 and trending down, remains afebrile, no signs or symptoms of infection  - Completed perioperative antibiotics  - Continue to monitor fever curve, CBC    Hematology:   Acute blood loss anemia and thrombocytopenia  Hgb 11.5; Plt WNL, no signs or symptoms of active bleeding  - CBC daily    Anticoagulation:   - ASA 81 mg  - Coumadin for LVAD with a goal INR of 2-3, INR therapeutic -- heparin gtt stopped    MSK/Skin:  Sternotomy  Surgical incision  - Sternal precautions  - Incisional cares per protocol    Prophylaxis:   -  Stress ulcer prophylaxis: Pantoprazole 40 mg daily for 30 days  - DVT prophylaxis: Subcutaneous heparin, SCD    Disposition:   - Transferred to  on 3/26  - Therapies recommending discharge to ARU, may progress to home    Discussed with Dr. Florencio Crowe PA-C on 3/28/2023 at 12:22 PM          Interval History:     No overnight events.  States pain is well managed on current regimen. Slept well overnight.  Tolerating diet, is passing flatus, had a BM yesterday. No nausea or vomiting.  Breathing well without complaints.   Working with therapies and ambulating with assistance.   Denies chest pain, palpitations, dizziness, syncopal symptoms, fevers, chills, myalgias, or sternal popping/clicking.         Physical Exam:     Gen: A&Ox4, NAD  Neuro: no focal deficits   CV: LVAD hum,  +JVD  Pulm: CTA, no wheezing or rhonchi, normal breathing on RA  Abd: nondistended, normal BS, soft, nontender  Ext: trace peripheral edema  Incision: clean, dry, intact, no erythema, sternum stable  Tubes/drain sites: dressing clean and dry, serosanguinous output, no air leak.  LVAD dressing clean, dry, intact.   Speed: 5200, Power: 3.6-3.7, Flow: 4.1-4.6, PI: 3.2-4.8          Data:    Imaging:  reviewed recent imaging, no acute concerns    Recent Results (from the past 24 hour(s))   XR Chest Port 1 View    Narrative    Portable chest 3/27/2023 at 1211 hours    INDICATION: Post pleural chest tube removal    COMPARISON: 0901 hours earlier today    FINDINGS: Heart upper normal size. LVAD. Implantable cardiac  defibrillator. Pericardial drain. Removal of previous left  thoracostomy tube. No definite pneumothorax. Haziness in the lower  lung fields and obscured left hemidiaphragm an slightly prominent  retrocardiac density all unchanged.      Impression    IMPRESSION: Removal left thoracostomy tube with no interval changes  otherwise.    FANNY MELARA MD         SYSTEM ID:  [id number]        Labs:  Recent Labs   Lab  03/28/23  0813 03/28/23  0357 03/27/23  2215 03/27/23  1932 03/27/23  1741 03/27/23  1541 03/27/23  1319 03/27/23  0749 03/27/23  0524 03/27/23  0434 03/26/23  0817 03/26/23  0521 03/26/23  0400   WBC  --  13.8*  --   --   --   --   --   --  15.3*  --   --   --  20.3*   HGB  --  11.5*  --   --   --   --   --   --  10.7*  --   --   --  11.1*   MCV  --  94  --   --   --   --   --   --  96  --   --   --  95   PLT  --  226  --   --   --   --   --   --  198  198  --   --   --  146*   INR  --  2.18*  --   --   --   --   --   --   --  1.40*  --  1.31*  --    NA  --  134*  134*  --  130*  --  129* 130*   < > 130*  --    < >  --  128*   POTASSIUM  --  3.6  --   --   --   --  3.6  --  3.7  --    < >  --  3.6   CHLORIDE  --  87*  --   --   --   --  88*  --  87*  --    < >  --  88*   CO2  --  38*  --   --   --   --  32*  --  34*  --    < >  --  30*   BUN  --  17.2  --   --   --   --  17.9  --  21.5*  --    < >  --  20.5*   CR  --  0.93  --   --   --   --  0.96  --  1.05  --    < >  --  0.65*   ANIONGAP  --  9  --   --   --   --  10  --  9  --    < >  --  10   TONY  --  8.4*  --   --   --   --  8.1*  --  8.5*  --    < >  --  8.5*   * 98 107*  --    < >  --  146*   < > 111*  --    < >  --  123*   ALBUMIN  --  2.5*  --   --   --   --  2.5*  --  2.6*  --    < >  --  2.7*   PROTTOTAL  --  5.0*  --   --   --   --  4.9*  --  4.9*  --    < >  --  4.9*   BILITOTAL  --  1.2  --   --   --   --  1.4*  --  1.6*  --    < >  --  3.7*   ALKPHOS  --  84  --   --   --   --  83  --  80  --    < >  --  74   ALT  --  25  --   --   --   --  26  --  24  --    < >  --  32   AST  --  57*  --   --   --   --  64*  --  68*  --    < >  --  109*    < > = values in this interval not displayed.      GLUCOSE:   Recent Labs   Lab 03/28/23  0813 03/28/23  0357 03/27/23  2215 03/27/23  1741 03/27/23  1319 03/27/23  1114   * 98 107* 116* 146* 148*

## 2023-03-29 ENCOUNTER — APPOINTMENT (OUTPATIENT)
Dept: GENERAL RADIOLOGY | Facility: CLINIC | Age: 58
DRG: 001 | End: 2023-03-29
Attending: INTERNAL MEDICINE
Payer: COMMERCIAL

## 2023-03-29 ENCOUNTER — APPOINTMENT (OUTPATIENT)
Dept: OCCUPATIONAL THERAPY | Facility: CLINIC | Age: 58
DRG: 001 | End: 2023-03-29
Attending: INTERNAL MEDICINE
Payer: COMMERCIAL

## 2023-03-29 ENCOUNTER — APPOINTMENT (OUTPATIENT)
Dept: PHYSICAL THERAPY | Facility: CLINIC | Age: 58
DRG: 001 | End: 2023-03-29
Attending: INTERNAL MEDICINE
Payer: COMMERCIAL

## 2023-03-29 LAB
ALBUMIN SERPL BCG-MCNC: 2.6 G/DL (ref 3.5–5.2)
ALBUMIN UR-MCNC: NEGATIVE MG/DL
ALP SERPL-CCNC: 101 U/L (ref 40–129)
ALT SERPL W P-5'-P-CCNC: 27 U/L (ref 10–50)
ANION GAP SERPL CALCULATED.3IONS-SCNC: 9 MMOL/L (ref 7–15)
APPEARANCE UR: CLEAR
AST SERPL W P-5'-P-CCNC: 60 U/L (ref 10–50)
ATRIAL RATE - MUSE: NORMAL BPM
BILIRUB SERPL-MCNC: 1.2 MG/DL
BILIRUB UR QL STRIP: NEGATIVE
BUN SERPL-MCNC: 16.6 MG/DL (ref 6–20)
CALCIUM SERPL-MCNC: 8.4 MG/DL (ref 8.6–10)
CHLORIDE SERPL-SCNC: 84 MMOL/L (ref 98–107)
COLOR UR AUTO: ABNORMAL
CREAT SERPL-MCNC: 0.96 MG/DL (ref 0.67–1.17)
CRP SERPL-MCNC: 124 MG/L
DEPRECATED HCO3 PLAS-SCNC: 36 MMOL/L (ref 22–29)
DIASTOLIC BLOOD PRESSURE - MUSE: NORMAL MMHG
ERYTHROCYTE [DISTWIDTH] IN BLOOD BY AUTOMATED COUNT: 14.9 % (ref 10–15)
GFR SERPL CREATININE-BSD FRML MDRD: >90 ML/MIN/1.73M2
GLUCOSE BLDC GLUCOMTR-MCNC: 114 MG/DL (ref 70–99)
GLUCOSE BLDC GLUCOMTR-MCNC: 153 MG/DL (ref 70–99)
GLUCOSE SERPL-MCNC: 97 MG/DL (ref 70–99)
GLUCOSE UR STRIP-MCNC: >=1000 MG/DL
HCT VFR BLD AUTO: 39.7 % (ref 40–53)
HGB BLD-MCNC: 12.3 G/DL (ref 13.3–17.7)
HGB UR QL STRIP: NEGATIVE
HOLD SPECIMEN: NORMAL
INR PPP: 2.61 (ref 0.85–1.15)
INTERPRETATION ECG - MUSE: NORMAL
KETONES UR STRIP-MCNC: NEGATIVE MG/DL
LEUKOCYTE ESTERASE UR QL STRIP: NEGATIVE
MAGNESIUM SERPL-MCNC: 3 MG/DL (ref 1.7–2.3)
MCH RBC QN AUTO: 29.7 PG (ref 26.5–33)
MCHC RBC AUTO-ENTMCNC: 31 G/DL (ref 31.5–36.5)
MCV RBC AUTO: 96 FL (ref 78–100)
NITRATE UR QL: NEGATIVE
P AXIS - MUSE: NORMAL DEGREES
PH UR STRIP: 7 [PH] (ref 5–7)
PHOSPHATE SERPL-MCNC: 3.4 MG/DL (ref 2.5–4.5)
PLATELET # BLD AUTO: 298 10E3/UL (ref 150–450)
POTASSIUM SERPL-SCNC: 3.5 MMOL/L (ref 3.4–5.3)
PR INTERVAL - MUSE: NORMAL MS
PROCALCITONIN SERPL IA-MCNC: 0.54 NG/ML
PROT SERPL-MCNC: 5.4 G/DL (ref 6.4–8.3)
QRS DURATION - MUSE: 122 MS
QT - MUSE: 360 MS
QTC - MUSE: 540 MS
R AXIS - MUSE: -87 DEGREES
RBC # BLD AUTO: 4.14 10E6/UL (ref 4.4–5.9)
RBC URINE: 1 /HPF
SODIUM SERPL-SCNC: 126 MMOL/L (ref 136–145)
SODIUM SERPL-SCNC: 128 MMOL/L (ref 136–145)
SODIUM SERPL-SCNC: 129 MMOL/L (ref 136–145)
SODIUM SERPL-SCNC: 129 MMOL/L (ref 136–145)
SP GR UR STRIP: 1.01 (ref 1–1.03)
SYSTOLIC BLOOD PRESSURE - MUSE: NORMAL MMHG
T AXIS - MUSE: 66 DEGREES
UROBILINOGEN UR STRIP-MCNC: NORMAL MG/DL
VENTRICULAR RATE- MUSE: 135 BPM
WBC # BLD AUTO: 17.1 10E3/UL (ref 4–11)
WBC URINE: <1 /HPF

## 2023-03-29 PROCEDURE — 250N000011 HC RX IP 250 OP 636: Performed by: STUDENT IN AN ORGANIZED HEALTH CARE EDUCATION/TRAINING PROGRAM

## 2023-03-29 PROCEDURE — 250N000011 HC RX IP 250 OP 636: Performed by: PHYSICIAN ASSISTANT

## 2023-03-29 PROCEDURE — 250N000013 HC RX MED GY IP 250 OP 250 PS 637: Performed by: INTERNAL MEDICINE

## 2023-03-29 PROCEDURE — 250N000011 HC RX IP 250 OP 636: Performed by: INTERNAL MEDICINE

## 2023-03-29 PROCEDURE — 99233 SBSQ HOSP IP/OBS HIGH 50: CPT | Mod: 25 | Performed by: INTERNAL MEDICINE

## 2023-03-29 PROCEDURE — 97110 THERAPEUTIC EXERCISES: CPT | Mod: GO

## 2023-03-29 PROCEDURE — 86140 C-REACTIVE PROTEIN: CPT | Performed by: PHYSICIAN ASSISTANT

## 2023-03-29 PROCEDURE — 250N000013 HC RX MED GY IP 250 OP 250 PS 637: Performed by: STUDENT IN AN ORGANIZED HEALTH CARE EDUCATION/TRAINING PROGRAM

## 2023-03-29 PROCEDURE — 81001 URINALYSIS AUTO W/SCOPE: CPT | Performed by: PHYSICIAN ASSISTANT

## 2023-03-29 PROCEDURE — 71046 X-RAY EXAM CHEST 2 VIEWS: CPT

## 2023-03-29 PROCEDURE — 83735 ASSAY OF MAGNESIUM: CPT | Performed by: INTERNAL MEDICINE

## 2023-03-29 PROCEDURE — 250N000013 HC RX MED GY IP 250 OP 250 PS 637: Performed by: PHYSICIAN ASSISTANT

## 2023-03-29 PROCEDURE — 84100 ASSAY OF PHOSPHORUS: CPT | Performed by: INTERNAL MEDICINE

## 2023-03-29 PROCEDURE — 84295 ASSAY OF SERUM SODIUM: CPT | Performed by: PHYSICIAN ASSISTANT

## 2023-03-29 PROCEDURE — 85610 PROTHROMBIN TIME: CPT | Performed by: STUDENT IN AN ORGANIZED HEALTH CARE EDUCATION/TRAINING PROGRAM

## 2023-03-29 PROCEDURE — 120N000003 HC R&B IMCU UMMC

## 2023-03-29 PROCEDURE — 87040 BLOOD CULTURE FOR BACTERIA: CPT | Performed by: PHYSICIAN ASSISTANT

## 2023-03-29 PROCEDURE — 258N000003 HC RX IP 258 OP 636: Performed by: PHYSICIAN ASSISTANT

## 2023-03-29 PROCEDURE — 97535 SELF CARE MNGMENT TRAINING: CPT | Mod: GO

## 2023-03-29 PROCEDURE — 80053 COMPREHEN METABOLIC PANEL: CPT | Performed by: PHYSICIAN ASSISTANT

## 2023-03-29 PROCEDURE — 97116 GAIT TRAINING THERAPY: CPT | Mod: GP

## 2023-03-29 PROCEDURE — 274N000009 HC DEVICE HM UNIVERSAL BATTERY CHARGER, INITIAL ONLY, EACH

## 2023-03-29 PROCEDURE — 258N000003 HC RX IP 258 OP 636

## 2023-03-29 PROCEDURE — 84145 PROCALCITONIN (PCT): CPT | Performed by: PHYSICIAN ASSISTANT

## 2023-03-29 PROCEDURE — 85027 COMPLETE CBC AUTOMATED: CPT | Performed by: PHYSICIAN ASSISTANT

## 2023-03-29 PROCEDURE — 274N000003 HC DEVICE HM 14-V LITHIUM BATTERY, INITIAL ONLY, EACH

## 2023-03-29 PROCEDURE — 36415 COLL VENOUS BLD VENIPUNCTURE: CPT | Performed by: PHYSICIAN ASSISTANT

## 2023-03-29 PROCEDURE — 97530 THERAPEUTIC ACTIVITIES: CPT | Mod: GP

## 2023-03-29 PROCEDURE — 71046 X-RAY EXAM CHEST 2 VIEWS: CPT | Mod: 26 | Performed by: RADIOLOGY

## 2023-03-29 RX ORDER — VANCOMYCIN HYDROCHLORIDE 1 G/200ML
1000 INJECTION, SOLUTION INTRAVENOUS EVERY 12 HOURS
Status: DISCONTINUED | OUTPATIENT
Start: 2023-03-29 | End: 2023-03-30

## 2023-03-29 RX ORDER — WARFARIN SODIUM 2 MG/1
2 TABLET ORAL
Status: COMPLETED | OUTPATIENT
Start: 2023-03-29 | End: 2023-03-29

## 2023-03-29 RX ORDER — PIPERACILLIN SODIUM, TAZOBACTAM SODIUM 4; .5 G/20ML; G/20ML
4.5 INJECTION, POWDER, LYOPHILIZED, FOR SOLUTION INTRAVENOUS EVERY 6 HOURS
Status: DISCONTINUED | OUTPATIENT
Start: 2023-03-29 | End: 2023-04-04

## 2023-03-29 RX ORDER — AMIODARONE HYDROCHLORIDE 200 MG/1
400 TABLET ORAL 2 TIMES DAILY
Status: COMPLETED | OUTPATIENT
Start: 2023-03-29 | End: 2023-04-01

## 2023-03-29 RX ORDER — POTASSIUM CHLORIDE 750 MG/1
20 TABLET, EXTENDED RELEASE ORAL ONCE
Status: COMPLETED | OUTPATIENT
Start: 2023-03-29 | End: 2023-03-29

## 2023-03-29 RX ADMIN — GABAPENTIN 300 MG: 300 CAPSULE ORAL at 10:54

## 2023-03-29 RX ADMIN — SPIRONOLACTONE 12.5 MG: 25 TABLET, FILM COATED ORAL at 10:57

## 2023-03-29 RX ADMIN — GABAPENTIN 300 MG: 300 CAPSULE ORAL at 16:35

## 2023-03-29 RX ADMIN — POLYETHYLENE GLYCOL 3350 17 G: 17 POWDER, FOR SOLUTION ORAL at 19:44

## 2023-03-29 RX ADMIN — SENNOSIDES AND DOCUSATE SODIUM 2 TABLET: 8.6; 5 TABLET ORAL at 19:44

## 2023-03-29 RX ADMIN — HYDROMORPHONE HYDROCHLORIDE 0.4 MG: 0.2 INJECTION, SOLUTION INTRAMUSCULAR; INTRAVENOUS; SUBCUTANEOUS at 12:21

## 2023-03-29 RX ADMIN — DIGOXIN 250 MCG: 125 TABLET ORAL at 10:43

## 2023-03-29 RX ADMIN — HYDROMORPHONE HYDROCHLORIDE 0.4 MG: 0.2 INJECTION, SOLUTION INTRAMUSCULAR; INTRAVENOUS; SUBCUTANEOUS at 22:07

## 2023-03-29 RX ADMIN — PIPERACILLIN SODIUM AND TAZOBACTAM SODIUM 4.5 G: 4; .5 INJECTION, POWDER, LYOPHILIZED, FOR SOLUTION INTRAVENOUS at 21:40

## 2023-03-29 RX ADMIN — AMIODARONE HYDROCHLORIDE 400 MG: 200 TABLET ORAL at 19:43

## 2023-03-29 RX ADMIN — VANCOMYCIN HYDROCHLORIDE 1000 MG: 1 INJECTION, SOLUTION INTRAVENOUS at 18:27

## 2023-03-29 RX ADMIN — PANTOPRAZOLE SODIUM 40 MG: 40 TABLET, DELAYED RELEASE ORAL at 10:43

## 2023-03-29 RX ADMIN — ASPIRIN 81 MG 81 MG: 81 TABLET ORAL at 10:57

## 2023-03-29 RX ADMIN — OXYCODONE HYDROCHLORIDE 10 MG: 10 TABLET ORAL at 00:41

## 2023-03-29 RX ADMIN — GABAPENTIN 300 MG: 300 CAPSULE ORAL at 19:43

## 2023-03-29 RX ADMIN — ACETAMINOPHEN 975 MG: 325 TABLET, FILM COATED ORAL at 16:34

## 2023-03-29 RX ADMIN — ACETAMINOPHEN 975 MG: 325 TABLET, FILM COATED ORAL at 05:35

## 2023-03-29 RX ADMIN — SODIUM CHLORIDE, POTASSIUM CHLORIDE, SODIUM LACTATE AND CALCIUM CHLORIDE 250 ML: 600; 310; 30; 20 INJECTION, SOLUTION INTRAVENOUS at 00:49

## 2023-03-29 RX ADMIN — HYDROMORPHONE HYDROCHLORIDE 0.2 MG: 0.2 INJECTION, SOLUTION INTRAMUSCULAR; INTRAVENOUS; SUBCUTANEOUS at 09:23

## 2023-03-29 RX ADMIN — OXYCODONE HYDROCHLORIDE 10 MG: 10 TABLET ORAL at 10:56

## 2023-03-29 RX ADMIN — WARFARIN SODIUM 2 MG: 2 TABLET ORAL at 21:34

## 2023-03-29 RX ADMIN — OXYCODONE HYDROCHLORIDE 10 MG: 10 TABLET ORAL at 05:35

## 2023-03-29 RX ADMIN — LISINOPRIL 2.5 MG: 2.5 TABLET ORAL at 16:38

## 2023-03-29 RX ADMIN — POTASSIUM CHLORIDE 20 MEQ: 750 TABLET, EXTENDED RELEASE ORAL at 10:53

## 2023-03-29 RX ADMIN — BUMETANIDE 3 MG: 2 TABLET ORAL at 16:39

## 2023-03-29 RX ADMIN — ACETAMINOPHEN 975 MG: 325 TABLET, FILM COATED ORAL at 21:33

## 2023-03-29 RX ADMIN — OXYCODONE HYDROCHLORIDE 10 MG: 10 TABLET ORAL at 19:43

## 2023-03-29 RX ADMIN — PIPERACILLIN SODIUM AND TAZOBACTAM SODIUM 4.5 G: 4; .5 INJECTION, POWDER, LYOPHILIZED, FOR SOLUTION INTRAVENOUS at 16:43

## 2023-03-29 RX ADMIN — AMIODARONE HYDROCHLORIDE 1 MG/MIN: 50 INJECTION, SOLUTION INTRAVENOUS at 05:02

## 2023-03-29 RX ADMIN — INSULIN ASPART 1 UNITS: 100 INJECTION, SOLUTION INTRAVENOUS; SUBCUTANEOUS at 18:28

## 2023-03-29 RX ADMIN — LIDOCAINE PATCH 4% 1 PATCH: 40 PATCH TOPICAL at 11:03

## 2023-03-29 RX ADMIN — OXYCODONE HYDROCHLORIDE 10 MG: 10 TABLET ORAL at 15:59

## 2023-03-29 RX ADMIN — EMPAGLIFLOZIN 10 MG: 10 TABLET, FILM COATED ORAL at 10:52

## 2023-03-29 ASSESSMENT — ACTIVITIES OF DAILY LIVING (ADL)
ADLS_ACUITY_SCORE: 25
ADLS_ACUITY_SCORE: 26
ADLS_ACUITY_SCORE: 25

## 2023-03-29 NOTE — CONSULTS
Cardiology Progress Note       Changes Today:   -Slight overdiuresis yesterday patient net -5 L with drop in PIs and required small fluid bolus overnight with subsequent improvement in PIs  - Heart rates better improved today, now sinus with rates in the 70s on amiodarone drip.  -Review of echocardiogram yesterday reveals that septum is midline and LV is adequately unloaded with aortic valve opening roughly every fourth beat    Physical Exam   Temp: 97.1  F (36.2  C) Temp src: Axillary BP: (!) 85/33 Pulse: 78   Resp: 16 SpO2: 94 % O2 Device: None (Room air) Oxygen Delivery: 2 LPM    Vital Signs with Ranges  Temp:  [97.1  F (36.2  C)-99.1  F (37.3  C)] 97.1  F (36.2  C)  Pulse:  [] 78  Resp:  [16-24] 16  BP: (85-87)/(33-74) 85/33  SpO2:  [91 %-99 %] 94 %    LVAD Settings  Heartmate 3 LEFT VS  Flow (Lpm): 4.2 Lpm  Pulse Index (PI): (!) 1.9 PI  Speed (rpm): 5200 rpm  Power (mari): 3.5 mari  Current Hct settin    Intake/Output    Intake/Output Summary (Last 24 hours) at 3/29/2023 0839  Last data filed at 3/29/2023 0700  Gross per 24 hour   Intake 2724.44 ml   Output 4850 ml   Net -2125.56 ml       Weight  Vitals:    23 0400 23 0600 23 0046 23 0514   Weight: 81.1 kg (178 lb 12.7 oz) 82.1 kg (181 lb) 83.5 kg (184 lb 1.4 oz) 83.8 kg (184 lb 11.9 oz)    23 0554   Weight: 75.8 kg (167 lb 1.7 oz)       Resp: 16        amiodarone 1 mg/min (23 0502)     BETA BLOCKER NOT PRESCRIBED           acetaminophen  975 mg Oral Q8H HOWIE     aspirin  81 mg Oral or NG Tube Daily     digoxin  250 mcg Oral Daily     empagliflozin  10 mg Oral Daily     gabapentin  300 mg Oral or Feeding Tube TID     insulin aspart  1-7 Units Subcutaneous TID AC     insulin aspart  1-5 Units Subcutaneous At Bedtime     lidocaine  1 patch Transdermal Q24H     lidocaine   Transdermal Q8H HOWIE     lisinopril  2.5 mg Oral Daily     pantoprazole  40 mg Oral QAM AC     polyethylene glycol  17 g Oral BID     potassium  "chloride  20 mEq Oral Once     senna-docusate  2 tablet Oral BID     sodium chloride (PF)  3 mL Intracatheter Q8H     spironolactone  12.5 mg Oral Daily     Warfarin Therapy Reminder  1 each Oral See Admin Instructions        , Blood pressure (!) 85/33, pulse 78, temperature 97.1  F (36.2  C), temperature source Axillary, resp. rate 16, height 1.702 m (5' 7\"), weight 75.8 kg (167 lb 1.7 oz), SpO2 94 %.  Constitutional: awake, alert, cooperative, no apparent distress, and appears stated age  Eyes: sclera clear, conjunctiva normal  Respiratory: No increased work of breathing, good air exchange, no wheezing  Cardiovascular: LVAD humm  GI: soft, non-distended, non-tender, no masses palpated  Skin: no bruising or bleeding  Neurologic: Awake, alert, oriented to name, place and time.  Cranial nerves II-XII are grossly intact.             Data   Recent Labs   Lab Test 03/29/23  0526 03/29/23  0403 03/28/23  2216 03/28/23  2058 03/28/23  0813 03/28/23  0357   *  129*  --   --  131*   < > 134*  134*   POTASSIUM 3.5  --   --   --   --  3.6   CHLORIDE 84*  --   --   --   --  87*   CO2 36*  --   --   --   --  38*   ANIONGAP 9  --   --   --   --  9   GLC 97 114*   < >  --    < > 98   BUN 16.6  --   --   --   --  17.2   CR 0.96  --   --   --   --  0.93   TONY 8.4*  --   --   --   --  8.4*    < > = values in this interval not displayed.       Lab Results   Component Value Date    WBC 13.8 03/28/2023    WBC 9.8 11/20/2019     Lab Results   Component Value Date    RBC 3.84 03/28/2023    RBC 4.70 11/20/2019     Lab Results   Component Value Date    HGB 11.5 03/28/2023    HGB 14.3 11/20/2019     Lab Results   Component Value Date    HCT 36.2 03/28/2023    HCT 45.3 11/20/2019     No components found for: MCT  Lab Results   Component Value Date    MCV 94 03/28/2023    MCV 96 11/20/2019     Lab Results   Component Value Date    MCH 29.9 03/28/2023    MCH 30.4 11/20/2019     Lab Results   Component Value Date    MCHC 31.8 03/28/2023 "    MCHC 31.6 11/20/2019     Lab Results   Component Value Date    RDW 14.7 03/28/2023    RDW 13.0 11/20/2019     Lab Results   Component Value Date     03/28/2023     11/20/2019        Liver Function Studies -   Recent Labs   Lab Test 03/29/23  0526   PROTTOTAL 5.4*   ALBUMIN 2.6*   BILITOTAL 1.2   ALKPHOS 101   AST 60*   ALT 27       Venous Blood Gas  Recent Labs   Lab 03/26/23  1135 03/26/23  0400 03/25/23  2340 03/25/23 2047   PHV 7.43 7.42 7.40 7.38   PCO2V 54* 57* 54* 60*   PO2V 43 38 39 30   HCO3V 35* 37* 34* 35*   SAUNDRA 9.4* 10.3* 7.4* 8.1*   O2PER 3 3 4 4       Arterial Blood Gas  Recent Labs   Lab 03/26/23  1135 03/26/23  0400 03/25/23 2340 03/25/23 2047 03/24/23 2006 03/24/23  1629 03/24/23  0750 03/24/23  0618 03/24/23  0356 03/24/23  0341 03/23/23  2354   PH  --   --   --   --   --  7.42  --  7.46* 7.45  --  7.44   PCO2  --   --   --   --   --  42  --  38 39  --  40   PO2  --   --   --   --   --  95  --  85 89  --  137*   HCO3  --   --   --   --   --  27  --  27 27  --  27   O2PER 3 3 4 4   < > 30   < > 30 30   < > 35    < > = values in this interval not displayed.          Recent Results (from the past 24 hour(s))   XR Chest Port 1 View    Narrative    EXAM: XR CHEST PORT 1 VIEW  3/28/2023 11:50 AM     HISTORY:  chest tubes s/p TVR and LVAD insertion       COMPARISON:  Chest radiograph 3/27/2023    FINDINGS:   AP portable view of the chest. Midline trachea. Stable cardiomegaly.  Left chest wall cardiac device and LVAD in stable position. Median  sternotomy wires. Stable positioning of left-sided chest tube and  mediastinal drains. No pneumothorax or significant effusion. Improved  right basilar opacities. Similar retrocardiac and left basilar  opacities. Visualized upper abdomen is unremarkable. No acute osseous  abnormality.      Impression    IMPRESSION:   1. Improved right basilar opacities and similar left  retrocardiac/basilar opacities, atelectasis versus consolidation.   2.  Support devices remain in similar position.    I have personally reviewed the examination and initial interpretation  and I agree with the findings.    SLICK VALDERRAMA MD         SYSTEM ID:  [id number]   Cardiac Device Check - Inpatient   Result Value    Date Time Interrogation Session [id number]    Implantable Pulse Generator  [employer]    Implantable Pulse Generator Model D150 DYNAGEN    Implantable Pulse Generator Serial Number [id number]    Type Interrogation Session In Clinic    Clinic Name [facility]    Implantable Pulse Generator Type Defibrillator    Implantable Pulse Generator Implant Date 20170608    Implantable Lead  [employer]    Implantable Lead Model 0293 [employer] 4-Site SG    Implantable Lead Serial Number [id number]    Implantable Lead Implant Date 20170608    Implantable Lead Polarity Type Bipolar Lead    Implantable Lead Location Detail 1 UNKNOWN    Implantable Lead Location Right Ventricle    Jesus Setting Mode (NBG Code) VVI    Jesus Setting Lower Rate Limit 40    Lead Channel Setting Sensing Polarity Bipolar    Lead Channel Setting Sensing Sensitivity 0.6    Lead Channel Setting Sensing Adaptation Mode Adaptive    Lead Channel Setting Pacing Polarity Bipolar    Lead Channel Setting Pacing Pulse Width 0.4    Lead Channel Setting Pacing Amplitude 2.8    Zone Setting Type Category VF    Zone Setting Vendor Type Category VF    Zone Setting Detection Interval 300    Zone Setting Type Category VT    Zone Setting Vendor Type Category VT    Zone Setting Detection Interval 353    Lead Channel Status Check Lead    Lead Channel Impedance Value 372    Lead Channel Pacing Threshold Amplitude 1.4    Lead Channel Pacing Threshold Pulse Width 0.4    Battery Date Time of Measurements 20230328101000    Battery Status Beginning of Service    Battery Remaining Longevity 120    Battery Remaining Percentage 100    Capacitor Charge Type Reformation     Capacitor Last Charge Date Time 20221112043400    Capacitor Charge Time 10.4    Jesus Statistic Date Time Start 20230323000000    Jesus Statistic Date Time End 20230328000000    Jesus Statistic RV Percent Paced 0    Therapy Statistic Recent Shocks Delivered 0    Therapy Statistic Recent Shocks Aborted 0    Therapy Statistic Recent ATP Delivered 0    Therapy Statistic Recent Date Time Start 20230323000000    Therapy Statistic Recent Date Time End 20230328000000    Therapy Statistic Total Shocks Delivered 0    Therapy Statistic Total Shocks Aborted 0    Therapy Statistic Total ATP Delivered 1    Therapy Statistic Total  Date Time Start 20170608000000    Therapy Statistic Total  Date Time End 20230328000000    Episode Statistic Recent Count 4    Episode Statistic Type Category Other    Episode Statistic Recent Count 0    Episode Statistic Type Category VT    Episode Statistic Vendor Type Category NSVT    Episode Statistic Recent Count 0    Episode Statistic Type Category VF    Episode Statistic Vendor Type Category VF    Episode Statistic Recent Count 0    Episode Statistic Type Category VT    Episode Statistic Vendor Type Category VT    Episode Statistic Recent Count 0    Episode Statistic Type Category VT    Episode Statistic Vendor Type Category VT-1    Episode Statistic Recent Date Time Start 96559160592033    Episode Statistic Recent Date Time End [id number]    Episode Statistic Recent Date Time Start [id number]    Episode Statistic Recent Date Time End [id number]    Episode Statistic Recent Date Time Start 93644485916254    Episode Statistic Recent Date Time End [id number]    Episode Statistic Recent Date Time Start 53554714235980    Episode Statistic Recent Date Time End [id number]    Episode Statistic Recent Date Time Start [id number]    Episode Statistic Recent Date Time End [id number]    Narrative    Patient seen on [facility] 6B for evaluation and iterative programming of their ICD per  MD orders.     Device: [employer] D150 DYNAGEN  Normal device function.   Mode: VVI 40 bpm  : <1%  Intrinsic Rhythm: Irregular VS ~115 bpm  R Waves remain small post LVAD implant, today measuring 1.8 mV  Estimated battery longevity to RRT = 10 years.  Ventricular Arrhythmia: Over the last 5 days 4 episodes recorded as NSVT. Only one EGM suggest NSVT lasting 5 sec at 170 bpm the others show irregular R-R intervals suggesting AF with RVR.  Setting Changes: None.  ABRAHAM Washington RN    Single lead ICD    I have reviewed and interpreted the device interrogation, settings, programming and nurse's summary. The device is functioning within normal device parameters. I agree with the current findings, assessment and plan.   XR Chest Port 1 View    Narrative    EXAM: XR CHEST PORT 1 VIEW  3/28/2023 1:30 PM     HISTORY:  post chest tube pull       COMPARISON:  3/28/2023 at 1104 hours    FINDINGS:   AP view of the chest. LVAD device. Left chest wall pacemaker. Interval  removal of left chest tube and mediastinal drains. Stable heart size.  Dense retrocardiac opacities with possible small left effusion. The  right lung is relatively clear with smooth right hemidiaphragm. No  right effusion. No pneumothorax. Osseous structures are unchanged.      Impression    IMPRESSION:   1. Removal of left chest tube and mediastinal drains. No evidence of  pneumothorax.  2. Retrocardiac and left basilar atelectasis/consolidation.    I have personally reviewed the examination and initial interpretation  and I agree with the findings.    SLICK VALDERRAMA MD         SYSTEM ID:  [id number]   Echo Complete   Result Value    LVEF  <20%    Narrative    [id number]  [id number]  [id number]  [id number]^AIAD^FELICIANO     [facility],[place]  Echocardiography Laboratory  [address] [place] [id number]     Name: CRISTOBAL THOMAS  MRN: [id number]  : 1965  Study Date: 2023 04:35 PM  Age: 57 yrs  Gender: Male  Patient  Location: [facility]  Reason For Study: Left Heart Failure  Ordering Physician: FELICIANO OVERTON  Performed By: Patience Ruiz     BSA: 2.0 m2  Height: 67 in  Weight: 184 lb  ______________________________________________________________________________  Procedure  Complete Portable Echo Adult. Technically difficult study.Extremely poor  acoustic windows. Limited information was obtained during study.  ______________________________________________________________________________  Interpretation Summary  Technically difficult study.Extremely poor acoustic windows.  Limited information was obtained during study.  LVAD cannula was seen in the expected anatomic position in the LV apex.  HM3 at 5200RPM.  Septum normal.  LVIDd 56mm.  Aortic valve not well visualized. No AI.  Normal outflow velocity.  Global right ventricular function is moderately to severely reduced.  Small pericardial effusion with organizing material in pericardial cavity.  ______________________________________________________________________________  Left Ventricle  The visual ejection fraction is <20%. LVAD cannula was seen in the expected  anatomic position in the LV apex. HM3 at 5200RPM.  Septum normal.  LVIDd 56mm.  Aortic valve not well visualized. No AI.  Normal outflow velocity.     Right Ventricle  Global right ventricular function is moderately to severely reduced.     Pericardium  Small pericardial effusion with organizing material in pericardial cavity.     ______________________________________________________________________________  Report approved by: Joseph Moralez 03/29/2023 08:24 AM     ______________________________________________________________________________          Blood culture:  Results for orders placed or performed during the hospital encounter of 03/15/23   Blood Culture Hand, Right    Specimen: Hand, Right; Blood   Result Value Ref Range    Culture No Growth    Blood Culture Line, venous    Specimen: Line, venous; Blood    Result Value Ref Range    Culture No Growth       Urine culture:  No results found for this or any previous visit.    Assessment & Plan    57 year old man with past medical history of HFrEF (EF 10% 5/2022) 2/2 NICM s/p ICD, tobacco use disorder, marijuana use, HTN, lumbar radiculopathy, CKD who presents after being found down at home. Found to have had a prolonged episode of VT with concern for cardiac arrest s/p ROSC in the field now s/p LVAD with HM3 on 03/23/23 and tricuspid annuloplasty.      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 Presented with out of hospital cardiac arrest with VT for 5 min 43 seconds of VT at 199 bpm, with spontaneous conversion to sinus rhythm without defibrillation on device interrogation on 3/15/2023.    - VT secondary prevention ICD in place  - Volume status: Hold diuretic for now  - BB: Hold PTA carvedilol 12.5 mg BID   - ACEi/ARB/ARNI: Lisinopril 2.5mg  - MRA: continue spironolactone 12.5 mg daily  - SGLT2i: Empaglaflozin daily   - SCD ppx/BiV pacer: ICD, reprogrammed device to provide ATP burst at VT threshold  - Heparin and coumadin when safe from surgical perspective  - Continue digoxin 250 mcg daily  -Amiodarone low dose infusion no bolus (ordered for you) please discuss with pharmacy when patient should be transitioned to oral amiodarone after load     Patient evaluated with Dr. Ceballos.     Marco Santoyo MD  Cardiology Fellow  [phone]

## 2023-03-29 NOTE — PROVIDER NOTIFICATION
12:12 am: Jennifer Hunt and Surg cross cover notified. Patient having PIs of 1.8-2 for LVAD setting, no alarms. And last MAP 60, recheck also 60. Denies dizziness.     Orders received: Jennifer Hunt MD ordered 250ml LR bolus.     2:24am: Jennifer Hunt MD called. Patient still having low PI (1.8-2.0) and low doppler MAPs (58-60). Patient is asymptomatic, denies dizziness, chest pain, lightheadedness, SOB, etc. Surgery cross cover (primary team overnight) also paged with update on patient status.    Orders received: Per Jennifer GODOY, as patient is asymptomatic at this time, plan is to continue to monitor MAPs and PI.

## 2023-03-29 NOTE — PLAN OF CARE
Hours of care 1900 - 0700    Temp: 98.3  F (36.8  C) Temp src: Oral    Pulse: 84   Resp: 17 SpO2: 99 % O2 Device: Nasal cannula Oxygen Delivery: 2 LPM    D: 57 year old man with past medical history of HFrEF (EF 10% 5/2022) 2/2 NICM s/p ICD, tobacco use disorder, marijuana use, HTN, lumbar radiculopathy, CKD who presents after being found down at home. Found to have had a prolonged episode of VT with concern for cardiac arrest s/p ROSC in the field. LVAD placed 3/23.      I: Monitored vitals and assessed pt status.   Changed: PIs of 1.8-2 @ 12 AM for LVAD, MAP 60. No alarms. Providers notified and order for 250 ml LR bolus placed. PIs ranging between 2.5-3.4 @ 6 AM.   Running: Amiodarone 1mg/min   PRN: Oxy, dilaudid     Neuro: A&O x4. Uses call light appropriately. Able to make needs known.   Cardiac:  SR. HR 80s. Periods of MAPs being 58-60 from midnight to 5 am. MAPs improved to 66 @ 6 am. HM3. LVAD parameters for flow, power, and speed WNL. PIs low raging from 1.8-2 most of the night-see provider notification note for details. No LVAD alarms overnight. Amiodarone gtt @ 1mg/min.   Respiratory: On 2L NC for comfort overnight otherwise on RA. Denies SOB.   GI/: Muro catheter for urinary retention. Urine output overnight. Potential plan for muro removal today. Moderate BM last night.  Diet: Regular. 2L fluid restriction.  Skin: See adult PCS.   LDAs: L posterior hand PIV infusing amio. L upper forearm PIV saline locked. Muro catheter in place, chaparro care provided.   Activity: Up with assistance x1 w/ gait belt. Denies dizziness/lightheadedness.    Pain: Pt endorses left rib pain due to family performing CPR. Rates pain 10/10. Some relief with PRN oxy and dilaudid. Now rates pain 7/10.      A: VSS. Afebrile.      P: Will continue plan of care and report changes to treatment team.

## 2023-03-29 NOTE — PHARMACY-VANCOMYCIN DOSING SERVICE
"Pharmacy Vancomycin Initial Note  Date of Service 2023  Patient's  1965  57 year old, male    Indication: Postoperative Infection    Current estimated CrCl = Estimated Creatinine Clearance: 93.2 mL/min (based on SCr of 0.96 mg/dL).    Creatinine for last 3 days  3/26/2023:  9:54 PM Creatinine 0.93 mg/dL  3/27/2023:  5:24 AM Creatinine 1.05 mg/dL;  1:19 PM Creatinine 0.96 mg/dL  3/28/2023:  3:57 AM Creatinine 0.93 mg/dL  3/29/2023:  5:26 AM Creatinine 0.96 mg/dL    Recent Vancomycin Level(s) for last 3 days  No results found for requested labs within last 72 hours.      Vancomycin IV Administrations (past 72 hours)      No vancomycin orders with administrations in past 72 hours.                Nephrotoxins and other renal medications (From now, onward)    Start     Dose/Rate Route Frequency Ordered Stop    23 1630  vancomycin (VANCOCIN) 1000 mg in dextrose 5% 200 mL PREMIX         1,000 mg  200 mL/hr over 1 Hours Intravenous EVERY 12 HOURS 23 1516      23 1600  bumetanide (BUMEX) tablet 3 mg         3 mg Oral 2 TIMES DAILY (Diuretics and Nitrates) 23 1156      23 1600  piperacillin-tazobactam (ZOSYN) 4.5 g vial to attach to  mL bag        Note to Pharmacy: For [facility], [facility] and [facility]: For Zosyn-naive patients, use the \"Zosyn initial dose + extended infusion\" order panel.    4.5 g  over 30 Minutes Intravenous EVERY 6 HOURS 23 1508      23 1300  empagliflozin (JARDIANCE) tablet 10 mg         10 mg Oral DAILY 23 1226      23 1230  lisinopril (ZESTRIL) tablet 2.5 mg         2.5 mg Oral DAILY 23 1226            Contrast Orders - past 72 hours (72h ago, onward)    None          [employer] Prediction of Planned Initial Vancomycin Regimen    Regimen: 1000 mg IV every 12 hours.  Start time: 15:15 on 2023  Exposure target: AUC24 (range)400-600 mg/L.hr   AUC24,ss: 528 mg/L.hr  Probability of AUC24 > 400: 79 %  Ctrough,ss: 16.9 mg/L  Probability of " Ctrough,ss > 20: 35 %  Probability of nephrotoxicity (Lodise JERONIMO 2009): 13 %        Plan:  1. Start vancomycin 1000 mg IV q12h.   2. Vancomycin monitoring method: AUC  3. Vancomycin therapeutic monitoring goal: 400-600 mg*h/L  4. Pharmacy will check vancomycin levels as appropriate in 1-3 Days.    5. Serum creatinine levels will be ordered daily for the first week of therapy and at least twice weekly for subsequent weeks.      Eryn Smith, JannieD

## 2023-03-29 NOTE — PROGRESS NOTES
This VAD coordinator did the dressing change on the driveline today. Compared to yesterday, there was slightly more serosanguinous drainage today - about 4cm x 4cm. There was nothing moist to culture. The redness at the insertion site is the same as yesterday. Goyo the CVTS RABIA then changed the old CT site dressing.    PLAN: sputum, urine, blood cultures. IV zosyn and vanco starting.

## 2023-03-30 ENCOUNTER — HOSPITAL ENCOUNTER (OUTPATIENT)
Facility: CLINIC | Age: 58
End: 2023-03-30
Attending: INTERNAL MEDICINE | Admitting: INTERNAL MEDICINE
Payer: COMMERCIAL

## 2023-03-30 ENCOUNTER — APPOINTMENT (OUTPATIENT)
Dept: PHYSICAL THERAPY | Facility: CLINIC | Age: 58
DRG: 001 | End: 2023-03-30
Attending: INTERNAL MEDICINE
Payer: COMMERCIAL

## 2023-03-30 DIAGNOSIS — I50.22 CHRONIC SYSTOLIC HEART FAILURE (H): ICD-10-CM

## 2023-03-30 DIAGNOSIS — I50.9 HEART FAILURE (H): ICD-10-CM

## 2023-03-30 DIAGNOSIS — Z95.811 LEFT VENTRICULAR ASSIST DEVICE PRESENT (H): Primary | ICD-10-CM

## 2023-03-30 DIAGNOSIS — Z95.811 LEFT VENTRICULAR ASSIST DEVICE PRESENT (H): ICD-10-CM

## 2023-03-30 LAB
ALBUMIN SERPL BCG-MCNC: 2.6 G/DL (ref 3.5–5.2)
ALP SERPL-CCNC: 97 U/L (ref 40–129)
ALT SERPL W P-5'-P-CCNC: 32 U/L (ref 10–50)
ANION GAP SERPL CALCULATED.3IONS-SCNC: 10 MMOL/L (ref 7–15)
AST SERPL W P-5'-P-CCNC: 64 U/L (ref 10–50)
BILIRUB SERPL-MCNC: 1.2 MG/DL
BUN SERPL-MCNC: 17.3 MG/DL (ref 6–20)
CALCIUM SERPL-MCNC: 8.3 MG/DL (ref 8.6–10)
CHLORIDE SERPL-SCNC: 85 MMOL/L (ref 98–107)
CREAT SERPL-MCNC: 1.21 MG/DL (ref 0.67–1.17)
CRP SERPL-MCNC: 132 MG/L
DEPRECATED HCO3 PLAS-SCNC: 35 MMOL/L (ref 22–29)
ERYTHROCYTE [DISTWIDTH] IN BLOOD BY AUTOMATED COUNT: 14.7 % (ref 10–15)
GFR SERPL CREATININE-BSD FRML MDRD: 71 ML/MIN/1.73M2
GLUCOSE BLDC GLUCOMTR-MCNC: 108 MG/DL (ref 70–99)
GLUCOSE BLDC GLUCOMTR-MCNC: 123 MG/DL (ref 70–99)
GLUCOSE BLDC GLUCOMTR-MCNC: 166 MG/DL (ref 70–99)
GLUCOSE BLDC GLUCOMTR-MCNC: 85 MG/DL (ref 70–99)
GLUCOSE SERPL-MCNC: 123 MG/DL (ref 70–99)
HCT VFR BLD AUTO: 35.5 % (ref 40–53)
HGB BLD-MCNC: 11 G/DL (ref 13.3–17.7)
INR PPP: 2.19 (ref 0.85–1.15)
MAGNESIUM SERPL-MCNC: 1.6 MG/DL (ref 1.7–2.3)
MCH RBC QN AUTO: 29.6 PG (ref 26.5–33)
MCHC RBC AUTO-ENTMCNC: 31 G/DL (ref 31.5–36.5)
MCV RBC AUTO: 95 FL (ref 78–100)
PHOSPHATE SERPL-MCNC: 3.3 MG/DL (ref 2.5–4.5)
PLATELET # BLD AUTO: 315 10E3/UL (ref 150–450)
POTASSIUM SERPL-SCNC: 3.4 MMOL/L (ref 3.4–5.3)
POTASSIUM SERPL-SCNC: 3.8 MMOL/L (ref 3.4–5.3)
PROCALCITONIN SERPL IA-MCNC: 0.49 NG/ML
PROT SERPL-MCNC: 5.2 G/DL (ref 6.4–8.3)
RBC # BLD AUTO: 3.72 10E6/UL (ref 4.4–5.9)
SODIUM SERPL-SCNC: 127 MMOL/L (ref 136–145)
SODIUM SERPL-SCNC: 127 MMOL/L (ref 136–145)
SODIUM SERPL-SCNC: 130 MMOL/L (ref 136–145)
SODIUM SERPL-SCNC: 130 MMOL/L (ref 136–145)
WBC # BLD AUTO: 19.9 10E3/UL (ref 4–11)

## 2023-03-30 PROCEDURE — 86140 C-REACTIVE PROTEIN: CPT | Performed by: PHYSICIAN ASSISTANT

## 2023-03-30 PROCEDURE — 85610 PROTHROMBIN TIME: CPT | Performed by: STUDENT IN AN ORGANIZED HEALTH CARE EDUCATION/TRAINING PROGRAM

## 2023-03-30 PROCEDURE — 93750 INTERROGATION VAD IN PERSON: CPT | Performed by: INTERNAL MEDICINE

## 2023-03-30 PROCEDURE — 84132 ASSAY OF SERUM POTASSIUM: CPT | Performed by: INTERNAL MEDICINE

## 2023-03-30 PROCEDURE — 250N000011 HC RX IP 250 OP 636: Performed by: INTERNAL MEDICINE

## 2023-03-30 PROCEDURE — 250N000013 HC RX MED GY IP 250 OP 250 PS 637: Performed by: STUDENT IN AN ORGANIZED HEALTH CARE EDUCATION/TRAINING PROGRAM

## 2023-03-30 PROCEDURE — 999N000128 HC STATISTIC PERIPHERAL IV START W/O US GUIDANCE

## 2023-03-30 PROCEDURE — 80053 COMPREHEN METABOLIC PANEL: CPT | Performed by: PHYSICIAN ASSISTANT

## 2023-03-30 PROCEDURE — 120N000003 HC R&B IMCU UMMC

## 2023-03-30 PROCEDURE — 250N000011 HC RX IP 250 OP 636: Performed by: STUDENT IN AN ORGANIZED HEALTH CARE EDUCATION/TRAINING PROGRAM

## 2023-03-30 PROCEDURE — 250N000013 HC RX MED GY IP 250 OP 250 PS 637: Performed by: PHYSICIAN ASSISTANT

## 2023-03-30 PROCEDURE — 36415 COLL VENOUS BLD VENIPUNCTURE: CPT | Performed by: PHYSICIAN ASSISTANT

## 2023-03-30 PROCEDURE — 83735 ASSAY OF MAGNESIUM: CPT | Performed by: THORACIC SURGERY (CARDIOTHORACIC VASCULAR SURGERY)

## 2023-03-30 PROCEDURE — 97530 THERAPEUTIC ACTIVITIES: CPT | Mod: GP

## 2023-03-30 PROCEDURE — 36415 COLL VENOUS BLD VENIPUNCTURE: CPT | Performed by: INTERNAL MEDICINE

## 2023-03-30 PROCEDURE — 84100 ASSAY OF PHOSPHORUS: CPT | Performed by: THORACIC SURGERY (CARDIOTHORACIC VASCULAR SURGERY)

## 2023-03-30 PROCEDURE — 85027 COMPLETE CBC AUTOMATED: CPT | Performed by: PHYSICIAN ASSISTANT

## 2023-03-30 PROCEDURE — 84295 ASSAY OF SERUM SODIUM: CPT | Performed by: PHYSICIAN ASSISTANT

## 2023-03-30 PROCEDURE — 250N000011 HC RX IP 250 OP 636: Performed by: PHYSICIAN ASSISTANT

## 2023-03-30 PROCEDURE — 250N000013 HC RX MED GY IP 250 OP 250 PS 637: Performed by: INTERNAL MEDICINE

## 2023-03-30 PROCEDURE — 97116 GAIT TRAINING THERAPY: CPT | Mod: GP

## 2023-03-30 PROCEDURE — 99233 SBSQ HOSP IP/OBS HIGH 50: CPT | Mod: 25 | Performed by: INTERNAL MEDICINE

## 2023-03-30 PROCEDURE — 84145 PROCALCITONIN (PCT): CPT | Performed by: PHYSICIAN ASSISTANT

## 2023-03-30 RX ORDER — POTASSIUM CHLORIDE 750 MG/1
20 TABLET, EXTENDED RELEASE ORAL ONCE
Status: COMPLETED | OUTPATIENT
Start: 2023-03-30 | End: 2023-03-30

## 2023-03-30 RX ORDER — POTASSIUM CHLORIDE 750 MG/1
40 TABLET, EXTENDED RELEASE ORAL ONCE
Status: COMPLETED | OUTPATIENT
Start: 2023-03-30 | End: 2023-03-30

## 2023-03-30 RX ORDER — VANCOMYCIN HYDROCHLORIDE 1 G/200ML
1000 INJECTION, SOLUTION INTRAVENOUS
Status: DISCONTINUED | OUTPATIENT
Start: 2023-03-30 | End: 2023-04-01

## 2023-03-30 RX ORDER — WARFARIN SODIUM 3 MG/1
3 TABLET ORAL
Status: COMPLETED | OUTPATIENT
Start: 2023-03-30 | End: 2023-03-30

## 2023-03-30 RX ORDER — MAGNESIUM SULFATE HEPTAHYDRATE 40 MG/ML
2 INJECTION, SOLUTION INTRAVENOUS ONCE
Status: COMPLETED | OUTPATIENT
Start: 2023-03-30 | End: 2023-03-30

## 2023-03-30 RX ADMIN — EMPAGLIFLOZIN 10 MG: 10 TABLET, FILM COATED ORAL at 08:58

## 2023-03-30 RX ADMIN — OXYCODONE HYDROCHLORIDE 10 MG: 10 TABLET ORAL at 13:51

## 2023-03-30 RX ADMIN — ACETAMINOPHEN 650 MG: 325 TABLET ORAL at 03:18

## 2023-03-30 RX ADMIN — HYDROMORPHONE HYDROCHLORIDE 0.4 MG: 0.2 INJECTION, SOLUTION INTRAMUSCULAR; INTRAVENOUS; SUBCUTANEOUS at 10:48

## 2023-03-30 RX ADMIN — WARFARIN SODIUM 3 MG: 3 TABLET ORAL at 18:39

## 2023-03-30 RX ADMIN — OXYCODONE HYDROCHLORIDE 10 MG: 10 TABLET ORAL at 23:03

## 2023-03-30 RX ADMIN — HYDROMORPHONE HYDROCHLORIDE 0.4 MG: 0.2 INJECTION, SOLUTION INTRAMUSCULAR; INTRAVENOUS; SUBCUTANEOUS at 20:09

## 2023-03-30 RX ADMIN — LIDOCAINE PATCH 4% 1 PATCH: 40 PATCH TOPICAL at 09:01

## 2023-03-30 RX ADMIN — GABAPENTIN 300 MG: 300 CAPSULE ORAL at 13:51

## 2023-03-30 RX ADMIN — SPIRONOLACTONE 12.5 MG: 25 TABLET, FILM COATED ORAL at 08:59

## 2023-03-30 RX ADMIN — GABAPENTIN 300 MG: 300 CAPSULE ORAL at 08:58

## 2023-03-30 RX ADMIN — PIPERACILLIN SODIUM AND TAZOBACTAM SODIUM 4.5 G: 4; .5 INJECTION, POWDER, LYOPHILIZED, FOR SOLUTION INTRAVENOUS at 23:04

## 2023-03-30 RX ADMIN — PIPERACILLIN SODIUM AND TAZOBACTAM SODIUM 4.5 G: 4; .5 INJECTION, POWDER, LYOPHILIZED, FOR SOLUTION INTRAVENOUS at 03:20

## 2023-03-30 RX ADMIN — HYDROMORPHONE HYDROCHLORIDE 0.4 MG: 0.2 INJECTION, SOLUTION INTRAMUSCULAR; INTRAVENOUS; SUBCUTANEOUS at 07:39

## 2023-03-30 RX ADMIN — PIPERACILLIN SODIUM AND TAZOBACTAM SODIUM 4.5 G: 4; .5 INJECTION, POWDER, LYOPHILIZED, FOR SOLUTION INTRAVENOUS at 10:52

## 2023-03-30 RX ADMIN — PIPERACILLIN SODIUM AND TAZOBACTAM SODIUM 4.5 G: 4; .5 INJECTION, POWDER, LYOPHILIZED, FOR SOLUTION INTRAVENOUS at 16:48

## 2023-03-30 RX ADMIN — MAGNESIUM SULFATE IN WATER 2 G: 40 INJECTION, SOLUTION INTRAVENOUS at 09:01

## 2023-03-30 RX ADMIN — INSULIN ASPART 1 UNITS: 100 INJECTION, SOLUTION INTRAVENOUS; SUBCUTANEOUS at 11:39

## 2023-03-30 RX ADMIN — AMIODARONE HYDROCHLORIDE 400 MG: 200 TABLET ORAL at 20:10

## 2023-03-30 RX ADMIN — LISINOPRIL 2.5 MG: 2.5 TABLET ORAL at 08:57

## 2023-03-30 RX ADMIN — OXYCODONE HYDROCHLORIDE 10 MG: 10 TABLET ORAL at 00:16

## 2023-03-30 RX ADMIN — GABAPENTIN 300 MG: 300 CAPSULE ORAL at 20:10

## 2023-03-30 RX ADMIN — HYDROMORPHONE HYDROCHLORIDE 0.2 MG: 0.2 INJECTION, SOLUTION INTRAMUSCULAR; INTRAVENOUS; SUBCUTANEOUS at 00:16

## 2023-03-30 RX ADMIN — DIGOXIN 250 MCG: 125 TABLET ORAL at 08:58

## 2023-03-30 RX ADMIN — HYDROMORPHONE HYDROCHLORIDE 0.4 MG: 0.2 INJECTION, SOLUTION INTRAMUSCULAR; INTRAVENOUS; SUBCUTANEOUS at 15:13

## 2023-03-30 RX ADMIN — PANTOPRAZOLE SODIUM 40 MG: 40 TABLET, DELAYED RELEASE ORAL at 08:58

## 2023-03-30 RX ADMIN — OXYCODONE HYDROCHLORIDE 10 MG: 10 TABLET ORAL at 18:39

## 2023-03-30 RX ADMIN — OXYCODONE HYDROCHLORIDE 10 MG: 10 TABLET ORAL at 04:26

## 2023-03-30 RX ADMIN — POTASSIUM CHLORIDE 40 MEQ: 750 TABLET, EXTENDED RELEASE ORAL at 08:59

## 2023-03-30 RX ADMIN — HYDROMORPHONE HYDROCHLORIDE 0.4 MG: 0.2 INJECTION, SOLUTION INTRAMUSCULAR; INTRAVENOUS; SUBCUTANEOUS at 03:19

## 2023-03-30 RX ADMIN — VANCOMYCIN HYDROCHLORIDE 1000 MG: 1 INJECTION, SOLUTION INTRAVENOUS at 04:26

## 2023-03-30 RX ADMIN — AMIODARONE HYDROCHLORIDE 400 MG: 200 TABLET ORAL at 08:57

## 2023-03-30 RX ADMIN — POTASSIUM CHLORIDE 20 MEQ: 750 TABLET, EXTENDED RELEASE ORAL at 13:52

## 2023-03-30 RX ADMIN — OXYCODONE HYDROCHLORIDE 10 MG: 10 TABLET ORAL at 08:55

## 2023-03-30 RX ADMIN — ASPIRIN 81 MG 81 MG: 81 TABLET ORAL at 08:58

## 2023-03-30 ASSESSMENT — ACTIVITIES OF DAILY LIVING (ADL)
ADLS_ACUITY_SCORE: 25
ADLS_ACUITY_SCORE: 26
ADLS_ACUITY_SCORE: 25
ADLS_ACUITY_SCORE: 25
ADLS_ACUITY_SCORE: 26
ADLS_ACUITY_SCORE: 25
ADLS_ACUITY_SCORE: 26
ADLS_ACUITY_SCORE: 25
ADLS_ACUITY_SCORE: 26
ADLS_ACUITY_SCORE: 25
ADLS_ACUITY_SCORE: 26
ADLS_ACUITY_SCORE: 26

## 2023-03-30 NOTE — PROGRESS NOTES
Cardiovascular Surgery Progress Note  03/30/2023         Assessment and Plan:     57 year old male with PMH of HFrEF (10%) secondary to NICM (suspected viral etiology) s/p ICD, chronic tobacco/marijuana use, COPD, HTN, CKD stage III, who presented to [facility] on 3/15 after being found down from a VT arrest. CPR was performed by family. Spontaneously converted to NSR without defibrillation. Down time suspected to be ~ 6 minutes. Presents to [facility] for LVAD placement with preop IABP by Dr. Matias on 3/23/23. He is now s/p LVAD (Heartmate III) placement, TVR, and PFO repair with Dr. Matias and Dr. Waller 3/23/22.    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 RV is mildly dilated and RV function is probably normal on milrinone with moderate to severe TR (there is reversal of hepatic venous flow). There is a swan in place which may be inhibiting coaptation. Trace AI. Mild MR. No MS. There is a continuous left to right shunt across a PFO.  Post-bypass JOAN: No AI. The aortic valve opens 1:3 cardiac cycles. No residual PFO. Atrial septum bulges into the left atrium. TR ring, mild to moderate TR. RV function mildly reduced on epi/norepi/vaso nitric. Inflow cannula is well positioned with low velocities. There is no visible aortic dissection.    HD stable. MAPS 60-70. Low PIs overnight, likely due to hypovolemia per cardiology. Hold diuresis as below.  Most recent echo showed LV EF 10%  - ASA 81 mg daily  - BB not indicated  - captopril 6.25 mg BID  - digoxin 250 mcg daily  - spironolactone 12.5 mg daily  - Diuresis as below    Atrial  fibrillation with RVR 3/28, now NSR  - amiodarone infusion 0.5 mg/h started 3/28 per cards, ongoing afib RVR so amiodarone increased to 1 mg/h  - transitioned to PO amiodarone 400 mg BID 3/29    Chest tubes: L pleural removed 3/27, mediastinals x2 removed 3/28  TPW: NA    Pulmonary:  Extubated POD 1 to 4 lpm via NC. Now saturating well on room air.   - Supplemental O2 PRN to keep sats > 92%. Wean off as tolerated.  - Pulm hygiene, IS, activity and deep breathing    Neurology / MSK:  Acute post-operative pain   Pain well controlled with current regimen:  - Scheduled: Tylenol   - PRN: IV Hydromorphone, PO Oxycodone, methocarbamol      / Renal / Fluid / Electrolytes:  Hyponatremia, likely due to volume overload -- diuresis as below  Most recent creatinine 1.21; adequate UOP.   FLUID STATUS: Pre-op weight 172 lb, weight today 167-6 lb; I/O past 24 hours: net even  - Diuresis IV Bumex 2 mg BID per cardiology, held AM dose 3/29 and transitioned to PO Bumex 3 mg BID per cardiology recs. Bumex held 3/30 due to suspected hypovolemia (discussed with cards)  - Replete lytes per protocol  - Strict I/O, daily weights  - Avoid/limit nephrotoxins as able  Urinary retention post-op  - remove muro 3/30    GI / Nutrition:   Elevated LFTs, improving  - Tolerating regular diet  - Continue bowel regimen, last BM 3/30    Endocrine:  Stress induced hyperglycemia   Hgb A1c 5.6  - Initially managed on insulin drip postop, transitioned to medium sliding scale; goal BG <180 for optimal healing    Infectious Disease:  Stress induced leukocytosis  WBC 19.9 and trending up, remains afebrile  - CRP elevated, procal elevated to 0.54 --> 0.49  - CXR with left consolidation concerning for pneumonia and patient complains of cough   - UA, blood cultures, and sputum sent 3/29 -- UA neg, blood cultures NGTD  - start empiric IV vancomycin and Zosyn for likely HAP versus alternate post-operative infection   - Follow up CXR 3/31 ordered   - If WBC  continues to up-trend, consider CT chest later this week per Dr. Matias  - Completed perioperative antibiotics  - Continue to monitor fever curve, CBC    Hematology:   Acute blood loss anemia and thrombocytopenia  Hgb stable 11-12; Plt WNL, no signs or symptoms of active bleeding  - CBC daily    Anticoagulation:   - ASA 81 mg  - Coumadin for LVAD with a goal INR of 2-3, INR therapeutic    MSK/Skin:  Sternotomy  Surgical incision  - Sternal precautions  - Incisional cares per protocol    Prophylaxis:   - Stress ulcer prophylaxis: Pantoprazole 40 mg daily for 30 days  - DVT prophylaxis: Subcutaneous heparin, SCD    Disposition:   - Transferred to  on 3/26  - Therapies recommending discharge to ARU, may progress to home    Discussed with Dr. Florencio Crowe PA-C on 3/30/2023 at 6:25 PM            Interval History:     Overnight had some low PIs. Felt to be due to hypovolemia per cards. Bumex held as above.  States pain is well managed on current regimen. Slept well overnight.  Tolerating diet, is passing flatus, +BM. No nausea or vomiting.  Breathing well without complaints.   Working with therapies and ambulating with assistance.   Denies chest pain, palpitations, dizziness, syncopal symptoms, fevers, chills, myalgias, or sternal popping/clicking.         Physical Exam:     Gen: A&Ox4, NAD  Neuro: no focal deficits   CV: LVAD hum  Pulm: CTA, no wheezing or rhonchi, normal breathing on RA  Abd: nondistended, normal BS, soft, nontender  Ext: 1+ peripheral edema  Incision: clean, dry, intact, no erythema, sternum stable  Tubes/drain sites: dressing clean and dry  LVAD dressing clean, dry, intact.   Speed: 5200, Power: 3.5-3.7, Flow: 4.5-5, PI: 2.1-3.1          Data:    Imaging:  reviewed recent imaging, no acute concerns    Recent Results (from the past 24 hour(s))   XR Chest 2 Views    Narrative    Chest 2 views    INDICATION: Chest tube removal posttricuspid valve repair    FINDINGS: Heart borderline  enlarged. Tricuspid valve repair.  Implantable cardiac defibrillator. LVAD. No change in the appearance  of the lungs.      Impression    IMPRESSION: No significant interval changes from yesterday.    FANNY MELARA MD         SYSTEM ID:  [id number]        Labs:  Recent Labs   Lab 03/30/23  0734 03/30/23  0413 03/29/23  2043 03/29/23  1755 03/29/23  1142 03/29/23  0526 03/28/23  0813 03/28/23  0357   WBC  --  19.9*  --   --   --  17.1*  --  13.8*   HGB  --  11.0*  --   --   --  12.3*  --  11.5*   MCV  --  95  --   --   --  96  --  94   PLT  --  315  --   --   --  298  --  226   INR  --  2.19*  --   --   --  2.61*  --  2.18*   NA  --  130*  130* 128*  --  126* 129*  129*   < > 134*  134*   POTASSIUM  --  3.4  --   --   --  3.5  --  3.6   CHLORIDE  --  85*  --   --   --  84*  --  87*   CO2  --  35*  --   --   --  36*  --  38*   BUN  --  17.3  --   --   --  16.6  --  17.2   CR  --  1.21*  --   --   --  0.96  --  0.93   ANIONGAP  --  10  --   --   --  9  --  9   TONY  --  8.3*  --   --   --  8.4*  --  8.4*   GLC 85 123*  --  153*  --  97   < > 98   ALBUMIN  --  2.6*  --   --   --  2.6*  --  2.5*   PROTTOTAL  --  5.2*  --   --   --  5.4*  --  5.0*   BILITOTAL  --  1.2  --   --   --  1.2  --  1.2   ALKPHOS  --  97  --   --   --  101  --  84   ALT  --  32  --   --   --  27  --  25   AST  --  64*  --   --   --  60*  --  57*    < > = values in this interval not displayed.      GLUCOSE:   Recent Labs   Lab 03/30/23  0734 03/30/23  0413 03/29/23  1755 03/29/23  0526 03/29/23  0403 03/28/23  2216   GLC 85 123* 153* 97 114* 98

## 2023-03-30 NOTE — PLAN OF CARE
Neuro: A&Ox4.   Cardiac: SR 80s. HM3; PI's ranging from 1.9-2.7, MAPs 60-65(MD aware, continue to monitor).    Respiratory: Sating >92 on RA, 2L NC with sleep.  GI/: Adequate urine output. BM X1  Diet/appetite: Tolerating regular diet, 2L FR. Eating well.  Activity:  SBA-Ax1, up to bathroom.   Pain: At acceptable level on current regimen. All PRN pain meds given when available.   Skin: No new deficits noted. Sternal incision WDL, old chest tube sites WDL.   LDA's: RPIV x2. HM 3.     Plan: Continue with POC. Notify primary team with changes.

## 2023-03-30 NOTE — PROGRESS NOTES
Cardiology Progress Note       Changes Today:   -hold diuresis today  -oral amiodarone     Physical Exam   Temp: 98  F (36.7  C) Temp src: Oral BP: (!) 69/45 Pulse: 91   Resp: 16 SpO2: 96 % O2 Device: None (Room air)      Vital Signs with Ranges  Temp:  [98  F (36.7  C)-98.9  F (37.2  C)] 98  F (36.7  C)  Pulse:  [81-95] 91  Resp:  [16-20] 16  BP: ()/(45-81) 69/45  SpO2:  [91 %-98 %] 96 %    LVAD Settings  Heartmate 3 LEFT VS  Flow (Lpm): 4.2 Lpm  Pulse Index (PI): (!) 1.9 PI  Speed (rpm): 5200 rpm  Power (mari): 3.5 mari  Current Hct settin    Intake/Output    Intake/Output Summary (Last 24 hours) at 3/29/2023 0839  Last data filed at 3/29/2023 0700  Gross per 24 hour   Intake 2724.44 ml   Output 4850 ml   Net -2125.56 ml       Weight  Vitals:    23 0600 23 0046 23 0514 23 0554   Weight: 82.1 kg (181 lb) 83.5 kg (184 lb 1.4 oz) 83.8 kg (184 lb 11.9 oz) 75.8 kg (167 lb 1.7 oz)    23 0543   Weight: 75.6 kg (166 lb 10.7 oz)       Resp: 16        BETA BLOCKER NOT PRESCRIBED           acetaminophen  975 mg Oral Q8H HOWIE     amiodarone  400 mg Oral BID     aspirin  81 mg Oral or NG Tube Daily     [Held by provider] bumetanide  3 mg Oral BID     digoxin  250 mcg Oral Daily     empagliflozin  10 mg Oral Daily     gabapentin  300 mg Oral or Feeding Tube TID     insulin aspart  1-7 Units Subcutaneous TID AC     insulin aspart  1-5 Units Subcutaneous At Bedtime     lidocaine  1 patch Transdermal Q24H     lidocaine   Transdermal Q8H HOWIE     lisinopril  2.5 mg Oral Daily     pantoprazole  40 mg Oral QAM AC     piperacillin-tazobactam  4.5 g Intravenous Q6H     polyethylene glycol  17 g Oral BID     senna-docusate  2 tablet Oral BID     sodium chloride (PF)  3 mL Intracatheter Q8H     spironolactone  12.5 mg Oral Daily     vancomycin  1,000 mg Intravenous Q18H     warfarin ANTICOAGULANT  3 mg Oral ONCE at 18:00     Warfarin Therapy Reminder  1 each Oral See Admin Instructions        ,  "Blood pressure (!) 69/45, pulse 91, temperature 98  F (36.7  C), temperature source Oral, resp. rate 16, height 1.702 m (5' 7\"), weight 75.6 kg (166 lb 10.7 oz), SpO2 96 %.  Constitutional: awake, alert, cooperative, no apparent distress, and appears stated age  Eyes: sclera clear, conjunctiva normal  Respiratory: No increased work of breathing, good air exchange, no wheezing  Cardiovascular: LVAD humm  GI: soft, non-distended, non-tender, no masses palpated  Skin: no bruising or bleeding  Neurologic: Awake, alert, oriented to name, place and time.  Cranial nerves II-XII are grossly intact.             Data   Recent Labs   Lab Test 03/29/23  0526 03/29/23  0403 03/28/23  2216 03/28/23  2058 03/28/23  0813 03/28/23  0357   *  129*  --   --  131*   < > 134*  134*   POTASSIUM 3.5  --   --   --   --  3.6   CHLORIDE 84*  --   --   --   --  87*   CO2 36*  --   --   --   --  38*   ANIONGAP 9  --   --   --   --  9   GLC 97 114*   < >  --    < > 98   BUN 16.6  --   --   --   --  17.2   CR 0.96  --   --   --   --  0.93   TONY 8.4*  --   --   --   --  8.4*    < > = values in this interval not displayed.       Lab Results   Component Value Date    WBC 13.8 03/28/2023    WBC 9.8 11/20/2019     Lab Results   Component Value Date    RBC 3.84 03/28/2023    RBC 4.70 11/20/2019     Lab Results   Component Value Date    HGB 11.5 03/28/2023    HGB 14.3 11/20/2019     Lab Results   Component Value Date    HCT 36.2 03/28/2023    HCT 45.3 11/20/2019     No components found for: MCT  Lab Results   Component Value Date    MCV 94 03/28/2023    MCV 96 11/20/2019     Lab Results   Component Value Date    MCH 29.9 03/28/2023    MCH 30.4 11/20/2019     Lab Results   Component Value Date    MCHC 31.8 03/28/2023    MCHC 31.6 11/20/2019     Lab Results   Component Value Date    RDW 14.7 03/28/2023    RDW 13.0 11/20/2019     Lab Results   Component Value Date     03/28/2023     11/20/2019        Liver Function Studies -   Recent " Labs   Lab Test 03/29/23  0526   PROTTOTAL 5.4*   ALBUMIN 2.6*   BILITOTAL 1.2   ALKPHOS 101   AST 60*   ALT 27       Venous Blood Gas  Recent Labs   Lab 03/26/23  1135 03/26/23  0400 03/25/23 2340 03/25/23 2047   PHV 7.43 7.42 7.40 7.38   PCO2V 54* 57* 54* 60*   PO2V 43 38 39 30   HCO3V 35* 37* 34* 35*   SAUNDRA 9.4* 10.3* 7.4* 8.1*   O2PER 3 3 4 4       Arterial Blood Gas  Recent Labs   Lab 03/26/23  1135 03/26/23  0400 03/25/23  2340 03/25/23 2047 03/24/23 2006 03/24/23  1629 03/24/23  0750 03/24/23  0618 03/24/23  0356 03/24/23 0341 03/23/23  2354   PH  --   --   --   --   --  7.42  --  7.46* 7.45  --  7.44   PCO2  --   --   --   --   --  42  --  38 39  --  40   PO2  --   --   --   --   --  95  --  85 89  --  137*   HCO3  --   --   --   --   --  27  --  27 27  --  27   O2PER 3 3 4 4   < > 30   < > 30 30   < > 35    < > = values in this interval not displayed.          No results found for this or any previous visit (from the past 24 hour(s)).    Blood culture:  Results for orders placed or performed during the hospital encounter of 03/15/23   Blood Culture Hand, Left    Specimen: Hand, Left; Blood   Result Value Ref Range    Culture No growth after 12 hours    Blood Culture Hand, Right    Specimen: Hand, Right; Blood   Result Value Ref Range    Culture No growth after 12 hours    Blood Culture Hand, Right    Specimen: Hand, Right; Blood   Result Value Ref Range    Culture No Growth    Blood Culture Line, venous    Specimen: Line, venous; Blood   Result Value Ref Range    Culture No Growth       Urine culture:  No results found for this or any previous visit.    Assessment & Plan    57 year old man with past medical history of HFrEF (EF 10% 5/2022) 2/2 NICM s/p ICD, tobacco use disorder, marijuana use, HTN, lumbar radiculopathy, CKD who presents after being found down at home. Found to have had a prolonged episode of VT with concern for cardiac arrest s/p ROSC in the field now s/p LVAD with HM3 on 03/23/23 and  tricuspid annuloplasty.      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 Presented with out of hospital cardiac arrest with VT for 5 min 43 seconds of VT at 199 bpm, with spontaneous conversion to sinus rhythm without defibrillation on device interrogation on 3/15/2023.    - VT secondary prevention ICD in place  - Volume status: Hold diuretic for now, reassess in am   - BB: Hold PTA carvedilol 12.5 mg BID   - ACEi/ARB/ARNI: Lisinopril 2.5mg  - MRA: continue spironolactone 12.5 mg daily  - SGLT2i: Empaglaflozin daily   - SCD ppx/BiV pacer: ICD, reprogrammed device to provide ATP burst at VT threshold  - Heparin and coumadin when safe from surgical perspective  - Continue digoxin 250 mcg daily  -Amiodarone 400 mg BID until gets to 10 gram load (including the IV amiodraone he has received since admission), then 200 mg once daily thereafter      Patient evaluated with Dr. Ceballos.     Mikey Hidalgo MD  Cardiology Fellow  Pager: [phone]

## 2023-03-30 NOTE — DISCHARGE INSTRUCTIONS
AFTER YOU GO HOME FROM YOUR HEART SURGERY  (S/p LVAD, tricuspid valve repair and PFO closure by Dr. Matias on 3/24/23)    You had a sternotomy, avoid lifting anything greater than ten pounds for 6 weeks after surgery and then less than 20 pounds for an additional 6 weeks. Do not reach backwards or use arms to push out of chair. Do not let people pull on your arms to assist with standing. Avoid twisting or reaching too far across your body.  Avoid strenuous activities such as bowling, vacuuming, raking, shoveling, golf or tennis for 12 weeks after your surgery. It is okay to resume sex if you feel comfortable in doing so. You may have to try different positions with your partner.  Splint your chest incision by hugging a pillow or bringing your arms across your chest when coughing or sneezing. Please try to sleep on your back for the first 4-6 weeks to avoid extra stress on your sternum (breastbone) while it is healing.     No driving for 4 weeks after surgery or while on pain medication.    Shower or wash your incisions twice daily with soap and water (or as instructed), pat dry. Keep wound clean and dry, showers are okay after discharge, but don't let spray hit directly on incision. No baths or swimming for 1 month. Cover chest tube sites with gauze until they stop draining, then leave open to air. It is not abnormal for chest tube sites to drain yellowish/clear fluid for up to 2-3 weeks after surgery.   Watch for signs of infection: increased redness, tenderness, warmth or any drainage from sternum incision.  Also a temperature > 100.5 F or chills. Call your surgeon or primary care provider's office immediately. Remove any skin glue left on incisions after 10-14 days. This will not affect your incision and can speed up healing.    Exercise is very important in your recovery. Please follow the guidelines set up for you in your cardiac rehab classes at the hospital. If outpatient cardiac rehab was ordered for you,  "we highly recommend you participate. If you have problems arranging your cardiac rehab, please call [phone] for all locations, with the exception of [place], please call [phone] and [place], please call [phone].    Avoid sitting for prolonged periods of time, try to walk every hour during the day. If you have a leg incision, elevate your leg often when you are not walking.    Check your weight when you get home from the hospital and continue to check it daily through your recovery for at least a month. If you notice a weight gain of 2-3 pounds in a week, notify your primary care physician, cardiologist or surgeon.    Bowel activity may be slow after surgery. If necessary, you may take an over the counter laxative such as Milk of Magnesia or Miralax. You may have stool softeners prescribed (docusate sodium, Senokot). We recommend using stool softeners while using narcotics for pain (oxycodone/percocet, hydrocodone/vicodin, hydromorphone/dilaudid).      Wean OFF of narcotics (oxycodone, dilaudid, hydrocodone) as soon as possible. You should continue taking acetaminophen as long as you have any surgical pain as the first choice for pain control and add narcotics as necessary for pain to be tolerable.      DENTAL VISITS AFTER SURGERY  If you have had your heart valve repaired or replaced, we do not recommend having any dental work done for 6 months and you will need to take an antibiotic prior to dental visits from now on.  Please notify your dentist before any procedure for the proper treatment needed. The antibiotic is taken by mouth one hour prior to visit. This includes routine cleanings.***  You can sometimes hear a mechanical valve \"clicking,\" this is normal and not a sign of something wrong.***    DO NOT SMOKE.  IF YOU NEED HELP QUITTING, PLEASE TALK WITH YOUR CARDIOLOGIST OR PRIMARY DOCTOR.    You are*** on a blood thinner, follow the instructions you were given in the hospital and DO NOT SKIP " this medication. Try and take it the same time everyday. Your primary care physician or coumadin clinic will manage the dosing. INR goal is 2-3.***    You have an LVAD device, so call your LVAD coordinator with all questions and concerns.  No swimming, showering, or baths. Daily sterile driveline dressing changes as instructed. You cannot have a MRI with your LVAD in place. No contact sports.     REGARDING PRESCRIPTION REFILLS.  If you need a refill on your pain medication contact us to discuss your pain and a possible one time refill.   All other medications will be adjusted, discontinued and re-filled by your primary care physician and/or your cardiologist as they were prior to your surgery. We have given you enough for one to three month with possibly one refill.    POST-OPERATIVE CLINIC VISITS  You have a follow up visit with [facility] Advance Care Practitioners on *** at the [facility]***.  You will then return to the care of your primary provider and your cardiologist. Future medication refills should come from your PCP or Cardiologist.   You should see your primary care provider in 2-4 weeks after discharge.   It is important to see your cardiologist about 4-6 weeks after discharge.    If you do not hear from a  in 7 days, please call [phone] (choose option 1) and request to be seen with a general cardiologist or someone that you have seen in the past.   If there is a need to return to see CT Surgery, please call our  Jimena Meneses at [phone].     SURGICAL QUESTIONS  Please call Edgar Kee, Laura Quesada, Bria Astudillo, or Kenzie Mon with surgical recovery and medication questions; phone numbers are listed below.  They will assist you with your needs and contact other surgery care team members as indicated.    On weekends or after hours, please call [phone] and ask the  to page the Cardiothoracic Surgery fellow on call.      Thank  you,    Your Cardiothoracic Surgery Team   Edgar Kee, RN Care Coordinator - [phone]  Laura Quesada, RN Care Coordinator - [phone]   Kenzie Diaz, RN Care Coordinator - [phone]   Jackie Astudillo, RN Care Coordinator - [phone]     Going home with your VAD    You are about to leave the hospital with your new VAD. This time can feel overwhelming. Your VAD Coordinator team is here to do everything we can to make this transition as smooth as possible. Below are contact numbers and information to help ease the process. A VAD Coordinator is available 24/7 should you have any questions. We would be more than happy to assist you.     Please remember, if you have a true emergency, ALWAYS call 911 first. Then call the on-call VAD Coordinator.     To Reach the On-Call VAD Coordinator: Dial the main hospital number at [phone]. Choose option 4 to speak with the . Ask him or her to page the on-call VAD Coordinator. We should get back to you within five minutes.     Symptoms to Report: Please contact the on-call VAD Coordinator to report:  Bleeding   Dizziness/lightheadedness  Changes in your VAD parameters (+/- 2 from baseline)  VAD alarms  Numbness, tingling, or difficulty moving your arms or legs  Changes in speech, swallowing, or mental status  ANY head injury, even if it is just a small bump  Dark, black, tarry, red, or bloody stools  If you vomit and it is bloody, black, or looks like coffee grounds  Increased drainage, redness, tenderness, or trauma to your driveline site, chest incision, or chest tube sites    Questions about your medications  Questions about your Coumadin or INR  Any other changes or concerns    Dressing Supplies: Your dressing supply company is [employer]. Please call them once you leave the hospital. They can be reached at [phone]. Verify that they have the correct address for delivery, especially if you are staying in the [place] before going home.      Blood Thinners: Your Coumadin (warfarin) will be managed by the [facility]. They can be reached M-F, 8am-4pm. They will communicate with you regarding your blood draws and your Coumadin dose. If you have an INR drawn and you don t hear from the clinic by 2pm please call them at [phone].  Please never allow anyone else to adjust your Coumadin or Aspirin without talking to a VAD Coordinator.     Clinic Appointments: The VAD team will schedule you for all of your follow-up visits. If you have any questions please call the main VAD office at [phone] to speak with our . You can also contact your VAD Coordinator.     VAD Coordinator: Your VAD Coordinator, Miroslava Lobato, can be reached M-F, 8am-4pm, at [phone] or [email or web address].    If you have any further questions or concerns about your VAD please refer to the discharge folder you received from your VAD Coordinator and/or call the on-call VAD Coordinator.

## 2023-03-30 NOTE — PHARMACY-VANCOMYCIN DOSING SERVICE
"Pharmacy Vancomycin Note  Date of Service 2023  Patient's  1965   57 year old, male    Indication: Postoperative Infection  Day of Therapy: 2  Current vancomycin regimen:  1000 mg IV q12h  Current vancomycin monitoring method: AUC  Current vancomycin therapeutic monitoring goal: 400-600 mg*h/L    InsightRX Prediction of Current Vancomycin Regimen  Loading dose: N/A  Regimen: 1000 mg IV every 12 hours.  Start time: 12:26 on 2023  Exposure target: AUC24 (range)400-600 mg/L.hr   AUC24,ss: 643 mg/L.hr  Probability of AUC24 > 400: 91 %  Ctrough,ss: 21.4 mg/L  Probability of Ctrough,ss > 20: 56 %  Probability of nephrotoxicity (Lodise JERONIMO ): 20 %      Current estimated CrCl = Estimated Creatinine Clearance: 73.8 mL/min (A) (based on SCr of 1.21 mg/dL (H)).    Creatinine for last 3 days  3/27/2023:  1:19 PM Creatinine 0.96 mg/dL  3/28/2023:  3:57 AM Creatinine 0.93 mg/dL  3/29/2023:  5:26 AM Creatinine 0.96 mg/dL  3/30/2023:  4:13 AM Creatinine 1.21 mg/dL    Recent Vancomycin Levels (past 3 days)  No results found for requested labs within last 72 hours.    Vancomycin IV Administrations (past 72 hours)                   vancomycin (VANCOCIN) 1000 mg in dextrose 5% 200 mL PREMIX (mg) 1,000 mg New Bag 23 0426     1,000 mg New Bag 23 1827                Nephrotoxins and other renal medications (From now, onward)    Start     Dose/Rate Route Frequency Ordered Stop    23 2230  vancomycin (VANCOCIN) 1000 mg in dextrose 5% 200 mL PREMIX         1,000 mg  200 mL/hr over 1 Hours Intravenous EVERY 18 HOURS 23 0733      23 1600  bumetanide (BUMEX) tablet 3 mg         3 mg Oral 2 TIMES DAILY (Diuretics and Nitrates) 23 1156      23 1600  piperacillin-tazobactam (ZOSYN) 4.5 g vial to attach to  mL bag        Note to Pharmacy: For [facility], [facility] and [facility]: For Zosyn-naive patients, use the \"Zosyn initial dose + extended infusion\" order panel.    4.5 g  over 30 Minutes " Intravenous EVERY 6 HOURS 03/29/23 1508      03/28/23 1300  empagliflozin (JARDIANCE) tablet 10 mg         10 mg Oral DAILY 03/28/23 1226      03/28/23 1230  lisinopril (ZESTRIL) tablet 2.5 mg         2.5 mg Oral DAILY 03/28/23 1226               Contrast Orders - past 72 hours (72h ago, onward)    None          Interpretation of levels and current regimen:  Vancomycin level is reflective of AUC greater than 600    Has serum creatinine changed greater than 50% in last 72 hours: No    Urine output:  good urine output    Renal Function: Worsening    InsightRX Prediction of Planned New Vancomycin Regimen  Regimen: 1000 mg IV every 18 hours.  Start time: 12:26 on 03/30/2023  Exposure target: AUC24 (range)400-600 mg/L.hr   AUC24,ss: 439 mg/L.hr  Probability of AUC24 > 400: 60 %  Ctrough,ss: 13.2 mg/L  Probability of Ctrough,ss > 20: 18 %  Probability of nephrotoxicity (Lodise JERONIMO 2009): 8 %      Plan:  1. Decrease Dose to 1000 mg q18h d/t possible FRANCINE  2. Vancomycin monitoring method: AUC  3. Vancomycin therapeutic monitoring goal: 400-600 mg*h/L  4. Pharmacy will check vancomycin levels as appropriate in 1-3 Days.  5. Serum creatinine levels will be ordered daily for the first week of therapy and at least twice weekly for subsequent weeks.    Rojelio Barron [facility]

## 2023-03-30 NOTE — PROVIDER NOTIFICATION
Provider notified: Cards 2    Time of notification: 0034  Patient status: FYI PI of 1.8-2 while sleeping, doppler MAP 60. Pt asymptomatic. PI incr with activity/alertness.   Orders received: continue to monitor     Consent: Written consent was obtained and risks were reviewed including but not limited to scarring, infection, bleeding, scabbing, incomplete removal, nerve damage and allergy to anesthesia.

## 2023-03-30 NOTE — PLAN OF CARE
Neuro: A&Ox4.   Cardiac: SR. Heart Mate 3. MAP 58-66 (MD aware). PI down to 2.3 while working with therapy, otherwise >3.0. No LVAD alarms this shift   Respiratory: Sating >94% on RA. 2L NC for comfort per patient request.  GI/: Adequate urine output. BM X1  Diet/appetite: Tolerating regular diet with 2L fluid restriction. Eating well.  Activity:  Assist of 1, up to bathroom and in halls.  Pain:  Left rib pain, at acceptable level with current pain medications.  Skin: No new deficits noted.  LDA's:   LVAD  PIV x 1 - TKO    Plan: Continue with POC. LVAD education on hold today. Family is feeling a little overwhelmed and needed a day off. Notify primary team with changes.

## 2023-03-30 NOTE — PLAN OF CARE
Care Provided 07:00 -19:00    Neuro: A&Ox4.   Cardiac: SR with HR 90s-low 100s. MAPs 60 - 72 today, higher in afternoon. HM3 LVAD with parameters WNL. Afebrile.   Respiratory: Sating >93% on RA, using 2-3 lpm during activity for comfort.  GI/: muro removed at 1600. Pt voided x1 this evening for 200 ml. BM X1  Diet/appetite: Tolerating regular diet. Appetite poor.  Activity:  Assist of 1, up to chair and in halls. Pt worked with therapy and walked in halls today.  Pain: partially relieved on current regimen. PRN Oxy and Dilaudid given for rib pain with some  relief.  Skin: No new deficits noted. LVAD dressing change done by LVAD coordinator during shift change.  LDA's: 2 LPIV - SL. HM3.     Vano and Zosyn started today for WBC elevated.  K replaced with recheck tomorrow.  Pt received 2+ hrs of LVAD teaching today with family present.    Plan: Continue with POC. Notify primary team with changes.

## 2023-03-31 ENCOUNTER — APPOINTMENT (OUTPATIENT)
Dept: PHYSICAL THERAPY | Facility: CLINIC | Age: 58
DRG: 001 | End: 2023-03-31
Attending: INTERNAL MEDICINE
Payer: COMMERCIAL

## 2023-03-31 ENCOUNTER — APPOINTMENT (OUTPATIENT)
Dept: OCCUPATIONAL THERAPY | Facility: CLINIC | Age: 58
DRG: 001 | End: 2023-03-31
Attending: INTERNAL MEDICINE
Payer: COMMERCIAL

## 2023-03-31 ENCOUNTER — APPOINTMENT (OUTPATIENT)
Dept: GENERAL RADIOLOGY | Facility: CLINIC | Age: 58
DRG: 001 | End: 2023-03-31
Attending: PHYSICIAN ASSISTANT
Payer: COMMERCIAL

## 2023-03-31 LAB
ALBUMIN SERPL BCG-MCNC: 2.9 G/DL (ref 3.5–5.2)
ALP SERPL-CCNC: 103 U/L (ref 40–129)
ALT SERPL W P-5'-P-CCNC: 31 U/L (ref 10–50)
ANION GAP SERPL CALCULATED.3IONS-SCNC: 12 MMOL/L (ref 7–15)
AST SERPL W P-5'-P-CCNC: 50 U/L (ref 10–50)
BILIRUB SERPL-MCNC: 1 MG/DL
BUN SERPL-MCNC: 15.1 MG/DL (ref 6–20)
CALCIUM SERPL-MCNC: 8.5 MG/DL (ref 8.6–10)
CHLORIDE SERPL-SCNC: 87 MMOL/L (ref 98–107)
CREAT SERPL-MCNC: 1.1 MG/DL (ref 0.67–1.17)
DEPRECATED HCO3 PLAS-SCNC: 31 MMOL/L (ref 22–29)
DIGOXIN SERPL-MCNC: 1.4 NG/ML (ref 0.6–2)
ERYTHROCYTE [DISTWIDTH] IN BLOOD BY AUTOMATED COUNT: 15 % (ref 10–15)
GFR SERPL CREATININE-BSD FRML MDRD: 79 ML/MIN/1.73M2
GLUCOSE BLDC GLUCOMTR-MCNC: 104 MG/DL (ref 70–99)
GLUCOSE BLDC GLUCOMTR-MCNC: 106 MG/DL (ref 70–99)
GLUCOSE BLDC GLUCOMTR-MCNC: 106 MG/DL (ref 70–99)
GLUCOSE BLDC GLUCOMTR-MCNC: 123 MG/DL (ref 70–99)
GLUCOSE SERPL-MCNC: 98 MG/DL (ref 70–99)
HCT VFR BLD AUTO: 37.6 % (ref 40–53)
HGB BLD-MCNC: 11.6 G/DL (ref 13.3–17.7)
INR PPP: 2.11 (ref 0.85–1.15)
MAGNESIUM SERPL-MCNC: 2 MG/DL (ref 1.7–2.3)
MCH RBC QN AUTO: 29.7 PG (ref 26.5–33)
MCHC RBC AUTO-ENTMCNC: 30.9 G/DL (ref 31.5–36.5)
MCV RBC AUTO: 96 FL (ref 78–100)
PHOSPHATE SERPL-MCNC: 2.6 MG/DL (ref 2.5–4.5)
PLATELET # BLD AUTO: 412 10E3/UL (ref 150–450)
POTASSIUM SERPL-SCNC: 3.7 MMOL/L (ref 3.4–5.3)
PROT SERPL-MCNC: 5.6 G/DL (ref 6.4–8.3)
RBC # BLD AUTO: 3.91 10E6/UL (ref 4.4–5.9)
SODIUM SERPL-SCNC: 129 MMOL/L (ref 136–145)
SODIUM SERPL-SCNC: 130 MMOL/L (ref 136–145)
VANCOMYCIN SERPL-MCNC: 13.1 UG/ML
WBC # BLD AUTO: 20.6 10E3/UL (ref 4–11)

## 2023-03-31 PROCEDURE — 120N000003 HC R&B IMCU UMMC

## 2023-03-31 PROCEDURE — 250N000013 HC RX MED GY IP 250 OP 250 PS 637: Performed by: STUDENT IN AN ORGANIZED HEALTH CARE EDUCATION/TRAINING PROGRAM

## 2023-03-31 PROCEDURE — 80162 ASSAY OF DIGOXIN TOTAL: CPT | Performed by: INTERNAL MEDICINE

## 2023-03-31 PROCEDURE — 84100 ASSAY OF PHOSPHORUS: CPT | Performed by: INTERNAL MEDICINE

## 2023-03-31 PROCEDURE — 85027 COMPLETE CBC AUTOMATED: CPT | Performed by: PHYSICIAN ASSISTANT

## 2023-03-31 PROCEDURE — 85610 PROTHROMBIN TIME: CPT | Performed by: STUDENT IN AN ORGANIZED HEALTH CARE EDUCATION/TRAINING PROGRAM

## 2023-03-31 PROCEDURE — 80202 ASSAY OF VANCOMYCIN: CPT | Performed by: INTERNAL MEDICINE

## 2023-03-31 PROCEDURE — 83735 ASSAY OF MAGNESIUM: CPT | Performed by: INTERNAL MEDICINE

## 2023-03-31 PROCEDURE — 97530 THERAPEUTIC ACTIVITIES: CPT | Mod: GP

## 2023-03-31 PROCEDURE — 250N000013 HC RX MED GY IP 250 OP 250 PS 637: Performed by: INTERNAL MEDICINE

## 2023-03-31 PROCEDURE — 250N000011 HC RX IP 250 OP 636: Performed by: PHYSICIAN ASSISTANT

## 2023-03-31 PROCEDURE — 250N000011 HC RX IP 250 OP 636: Performed by: STUDENT IN AN ORGANIZED HEALTH CARE EDUCATION/TRAINING PROGRAM

## 2023-03-31 PROCEDURE — 250N000013 HC RX MED GY IP 250 OP 250 PS 637: Performed by: PHYSICIAN ASSISTANT

## 2023-03-31 PROCEDURE — 80053 COMPREHEN METABOLIC PANEL: CPT | Performed by: PHYSICIAN ASSISTANT

## 2023-03-31 PROCEDURE — 97116 GAIT TRAINING THERAPY: CPT | Mod: GP

## 2023-03-31 PROCEDURE — 250N000011 HC RX IP 250 OP 636: Performed by: INTERNAL MEDICINE

## 2023-03-31 PROCEDURE — 84295 ASSAY OF SERUM SODIUM: CPT | Performed by: PHYSICIAN ASSISTANT

## 2023-03-31 PROCEDURE — 274N000012 HC DEVICE HM POWER UNIT MOBILE W/AC PWR CABLE, INITIAL ONLY, EACH

## 2023-03-31 PROCEDURE — 71046 X-RAY EXAM CHEST 2 VIEWS: CPT

## 2023-03-31 PROCEDURE — 36415 COLL VENOUS BLD VENIPUNCTURE: CPT | Performed by: PHYSICIAN ASSISTANT

## 2023-03-31 PROCEDURE — 93750 INTERROGATION VAD IN PERSON: CPT | Performed by: INTERNAL MEDICINE

## 2023-03-31 PROCEDURE — 36415 COLL VENOUS BLD VENIPUNCTURE: CPT | Performed by: INTERNAL MEDICINE

## 2023-03-31 PROCEDURE — 99233 SBSQ HOSP IP/OBS HIGH 50: CPT | Mod: 25 | Performed by: INTERNAL MEDICINE

## 2023-03-31 PROCEDURE — 274N000006 HC DEVICE HM POCKET SHOWER BAG, INITIAL ONLY, EACH

## 2023-03-31 PROCEDURE — 97535 SELF CARE MNGMENT TRAINING: CPT | Mod: GO

## 2023-03-31 PROCEDURE — 71046 X-RAY EXAM CHEST 2 VIEWS: CPT | Mod: 26 | Performed by: RADIOLOGY

## 2023-03-31 RX ORDER — WARFARIN SODIUM 3 MG/1
3 TABLET ORAL
Status: COMPLETED | OUTPATIENT
Start: 2023-03-31 | End: 2023-03-31

## 2023-03-31 RX ORDER — POTASSIUM CHLORIDE 750 MG/1
20 TABLET, EXTENDED RELEASE ORAL ONCE
Status: COMPLETED | OUTPATIENT
Start: 2023-03-31 | End: 2023-03-31

## 2023-03-31 RX ORDER — MAGNESIUM OXIDE 400 MG/1
400 TABLET ORAL EVERY 4 HOURS
Status: COMPLETED | OUTPATIENT
Start: 2023-03-31 | End: 2023-03-31

## 2023-03-31 RX ADMIN — WARFARIN SODIUM 3 MG: 3 TABLET ORAL at 17:24

## 2023-03-31 RX ADMIN — OXYCODONE HYDROCHLORIDE 10 MG: 10 TABLET ORAL at 13:02

## 2023-03-31 RX ADMIN — PIPERACILLIN SODIUM AND TAZOBACTAM SODIUM 4.5 G: 4; .5 INJECTION, POWDER, LYOPHILIZED, FOR SOLUTION INTRAVENOUS at 15:15

## 2023-03-31 RX ADMIN — LISINOPRIL 2.5 MG: 2.5 TABLET ORAL at 08:52

## 2023-03-31 RX ADMIN — DIGOXIN 250 MCG: 125 TABLET ORAL at 08:52

## 2023-03-31 RX ADMIN — PIPERACILLIN SODIUM AND TAZOBACTAM SODIUM 4.5 G: 4; .5 INJECTION, POWDER, LYOPHILIZED, FOR SOLUTION INTRAVENOUS at 04:15

## 2023-03-31 RX ADMIN — SPIRONOLACTONE 12.5 MG: 25 TABLET, FILM COATED ORAL at 08:54

## 2023-03-31 RX ADMIN — POTASSIUM CHLORIDE 20 MEQ: 750 TABLET, EXTENDED RELEASE ORAL at 08:52

## 2023-03-31 RX ADMIN — HYDROMORPHONE HYDROCHLORIDE 0.4 MG: 0.2 INJECTION, SOLUTION INTRAMUSCULAR; INTRAVENOUS; SUBCUTANEOUS at 00:44

## 2023-03-31 RX ADMIN — MAGNESIUM OXIDE TAB 400 MG (241.3 MG ELEMENTAL MG) 400 MG: 400 (241.3 MG) TAB at 13:02

## 2023-03-31 RX ADMIN — VANCOMYCIN HYDROCHLORIDE 1000 MG: 1 INJECTION, SOLUTION INTRAVENOUS at 00:31

## 2023-03-31 RX ADMIN — OXYCODONE HYDROCHLORIDE 10 MG: 10 TABLET ORAL at 17:25

## 2023-03-31 RX ADMIN — HYDROMORPHONE HYDROCHLORIDE 0.2 MG: 0.2 INJECTION, SOLUTION INTRAMUSCULAR; INTRAVENOUS; SUBCUTANEOUS at 15:29

## 2023-03-31 RX ADMIN — ACETAMINOPHEN 975 MG: 325 TABLET, FILM COATED ORAL at 08:52

## 2023-03-31 RX ADMIN — LIDOCAINE PATCH 4% 1 PATCH: 40 PATCH TOPICAL at 08:49

## 2023-03-31 RX ADMIN — PIPERACILLIN SODIUM AND TAZOBACTAM SODIUM 4.5 G: 4; .5 INJECTION, POWDER, LYOPHILIZED, FOR SOLUTION INTRAVENOUS at 22:25

## 2023-03-31 RX ADMIN — OXYCODONE HYDROCHLORIDE 10 MG: 10 TABLET ORAL at 04:14

## 2023-03-31 RX ADMIN — MAGNESIUM OXIDE TAB 400 MG (241.3 MG ELEMENTAL MG) 400 MG: 400 (241.3 MG) TAB at 08:52

## 2023-03-31 RX ADMIN — GABAPENTIN 300 MG: 300 CAPSULE ORAL at 19:46

## 2023-03-31 RX ADMIN — EMPAGLIFLOZIN 10 MG: 10 TABLET, FILM COATED ORAL at 08:54

## 2023-03-31 RX ADMIN — OXYCODONE HYDROCHLORIDE 10 MG: 10 TABLET ORAL at 08:53

## 2023-03-31 RX ADMIN — OXYCODONE HYDROCHLORIDE 10 MG: 10 TABLET ORAL at 22:25

## 2023-03-31 RX ADMIN — AMIODARONE HYDROCHLORIDE 400 MG: 200 TABLET ORAL at 08:53

## 2023-03-31 RX ADMIN — VANCOMYCIN HYDROCHLORIDE 1000 MG: 1 INJECTION, SOLUTION INTRAVENOUS at 17:24

## 2023-03-31 RX ADMIN — HYDROMORPHONE HYDROCHLORIDE 0.2 MG: 0.2 INJECTION, SOLUTION INTRAMUSCULAR; INTRAVENOUS; SUBCUTANEOUS at 21:00

## 2023-03-31 RX ADMIN — GABAPENTIN 300 MG: 300 CAPSULE ORAL at 13:02

## 2023-03-31 RX ADMIN — ASPIRIN 81 MG 81 MG: 81 TABLET ORAL at 08:53

## 2023-03-31 RX ADMIN — PANTOPRAZOLE SODIUM 40 MG: 40 TABLET, DELAYED RELEASE ORAL at 08:52

## 2023-03-31 RX ADMIN — GABAPENTIN 300 MG: 300 CAPSULE ORAL at 08:53

## 2023-03-31 RX ADMIN — AMIODARONE HYDROCHLORIDE 400 MG: 200 TABLET ORAL at 19:46

## 2023-03-31 RX ADMIN — PIPERACILLIN SODIUM AND TAZOBACTAM SODIUM 4.5 G: 4; .5 INJECTION, POWDER, LYOPHILIZED, FOR SOLUTION INTRAVENOUS at 09:26

## 2023-03-31 RX ADMIN — ACETAMINOPHEN 975 MG: 325 TABLET, FILM COATED ORAL at 15:15

## 2023-03-31 ASSESSMENT — ACTIVITIES OF DAILY LIVING (ADL)
ADLS_ACUITY_SCORE: 27
ADLS_ACUITY_SCORE: 26
ADLS_ACUITY_SCORE: 27
ADLS_ACUITY_SCORE: 24
ADLS_ACUITY_SCORE: 26

## 2023-03-31 NOTE — PLAN OF CARE
Goal Outcome Evaluation:      Plan of Care Reviewed With: patient, spouse, family    Overall Patient Progress: improvingOverall Patient Progress: improving    Outcome Evaluation: Provided pt education on high protein diet rationale per LVAD post-op, and list of high protein foods.

## 2023-03-31 NOTE — PLAN OF CARE
Neuro: A&Ox4.   Cardiac: SR. VSS. HM3. No alarms. MAPs 60-80s  Respiratory: Sating well on RA.2L for comfort  GI/: Adequate urine output. BM X1  Diet/appetite: Tolerating reg diet. Eating well.  Activity:  Assist of 1 to bathroom  Pain: At acceptable level on current regimen.   Skin: No new deficits noted.  LDA's: L PIV SL    Plan: Continue with POC. Notify primary team with changes.

## 2023-03-31 NOTE — PROGRESS NOTES
VAD Social Work Services Progress Note      Date of Initial Social Work Evaluation: 3/17/2023  Collaborated with: Akshat, Wife-Landry and Son-Oneil    Data: Akshat remains on 6B progressing post-VAD implant. They have gone through 3 sessions of VAD education with more sessions set up for next week. Akshat seems to working with therapy and progressing from a physical standpoint as well. Current recommendations are for Akshat to discharge to [facility], but Akshat reports wanting to discharge home. Per therapy notes, he is making significant progress. Oneli continues to see health psychology and per Wife and Son, finding this extremely helpful. Projected discharge home middle to end of next week.  Intervention: Met with Akshat and family for a supportive visit post-VAD implant. Inquired into questions/concerns and assessed coping. Reminded them of VAD support group on Tuesdays and marleni assistance for financial support. Talked about discharge planning and that many patients after surgery to to Inpatient TCU or ARU at discharge. Talked about the discharge planning process to rehab if that is what is needed.  Assessment: Akshat was talkative. Reports feeling better. Has felt increased stress over the last couple of days. Reports education going well and that he has intentions on going straight home from the hospital and not rehab. Open to outpatient cardiac rehab. He and family also seem open to joining support group. Wife has all of the information at home.   Education provided by SW: Ongoing SW availability, Support group availability and financial supports  Plan:    Discharge Plans in Progress: Home vs. [facility] TCU/ARU    Barriers to d/c plan: Medical condition    Follow up Plan: SW will continue to follow for ongoing psychosocial support post-VAD and assistance with discharge planning.

## 2023-03-31 NOTE — PROGRESS NOTES
Today, Akshat was issued the following pieces of VAD equipment:   -Battery Charger [id number]   -Battery x 4 [id number]   -Mobile Power Unit [id number]   -PC Shower Bag [id number]   -White plastic dressin gchange board    Landry agreed to take these items home and to set them up.

## 2023-03-31 NOTE — PROGRESS NOTES
"CLINICAL NUTRITION SERVICES - REASSESSMENT NOTE     Nutrition Prescription    RECOMMENDATIONS FOR MDs/PROVIDERS TO ORDER:  None at this time    Malnutrition Status:    Patient does not meet two of the established criteria necessary for diagnosing malnutrition    Recommendations already ordered by Registered Dietitian (RD):  Snacks/supplements PRN  Provided pt with \"Sources of Protein\" handout  Provided education on high protein needs per LVAD post-op recovery    Future/Additional Recommendations:  Monitor wt trends, po intake and tolerance     EVALUATION OF THE PROGRESS TOWARD GOALS   Diet: 2000 mL Fluid Restriction and Regular  Intake: Pt consuming % of ordered meals and outside food.      NEW FINDINGS   Met with pt, his wife and son in room this afternoon. Pt reports having a good appetite for outside food like steak, ribs, BBQ, etc., and no longer tolerates drinking oral supplements like Ensure. Pt c/o of certain hospital foods like pizza being too salty to eat, as he has gotten so used to a low sodium diet over the past year. Pt getting a significant amount of outside foods, but is still making a good effort to be compliant with his hospital diet, and asking about which foods/drinks are appropriate for him to have.     Weights:  03/31/23 0338 79.2 kg (174 lb 9.7 oz) Standing scale   03/30/23 0543 75.6 kg (166 lb 10.7 oz) Standing scale   03/29/23 0554 75.8 kg (167 lb 1.7 oz) Standing scale   03/28/23 0514 83.8 kg (184 lb 11.9 oz) Bed scale   03/27/23 0046 83.5 kg (184 lb 1.4 oz) Bed scale   03/26/23 0600 82.1 kg (181 lb) Bed scale   03/24/23 0400 81.1 kg (178 lb 12.7 oz) Bed scale   03/23/23 0000 80.5 kg (177 lb 7.5 oz) Bed scale   03/22/23 0544 73.4 kg (161 lb 13.1 oz) --   03/21/23 0400 75.3 kg (166 lb 0.1 oz) Bed scale   03/20/23 0600 72.2 kg (159 lb 2.8 oz) Bed scale   03/19/23 0530 72.6 kg (160 lb 0.9 oz) --   03/18/23 0400 73.2 kg (161 lb 6 oz) Bed scale   03/17/23 0400 74.6 kg (164 lb 7.4 oz) Bed " "scale   03/16/23 0330 78.3 kg (172 lb 9.9 oz) Standing scale     Labs:  Na: 130 (L) stable  Glucose: WNL for acute care    Meds:  Bumex (last given 3/29)  Jardiance  Novolog (last given 3/30, 1 units)  Warfarin    GI:    Last BM: 3/30, 1x daily avg last 4 days    MALNUTRITION  % Intake: Decreased intake does not meet criteria  % Weight Loss: Unable to assess d/t fluid fluctuations  Subcutaneous Fat Loss: None observed  Muscle Loss: Temporal:  Mild-moderate  Fluid Accumulation/Edema: Does not meet criteria, trace in both feet/ankles  Malnutrition Diagnosis: Patient does not meet two of the established criteria necessary for diagnosing malnutrition    Previous Goals   Patient to consume % of nutritionally adequate meal trays TID, or the equivalent with supplements/snacks.  Evaluation: Met    Previous Nutrition Diagnosis  Inadequate oral intake related to intubation as evidenced by NPO status.   Evaluation: No longer applicable, nutrition diagnosis changed below    CURRENT NUTRITION DIAGNOSIS  Inadequate protein intake related to food and nutrition knowledge deficit as evidenced by pt inquiring about high-protein food sources    INTERVENTIONS  Implementation  Encourage intake of high protein foods  Nutrition education for recommended modifications - Provided pt with \"Sources of Protein\" handout  Nutrition education for nutrition relationship to health/disease - provided rationale for high protein intake per LVAD post-op recovery    Goals  Patient to consume % of nutritionally adequate meal trays TID, or the equivalent with supplements/snacks.    Monitoring/Evaluation  Progress toward goals will be monitored and evaluated per protocol.    Abisai Robison - Dietetic Intern    "

## 2023-03-31 NOTE — CONSULTS
Cardiology Progress Note       Changes Today:   -Agree with continuing to hold diuresis    Physical Exam   Temp: 98.5  F (36.9  C) Temp src: Oral BP: (!)  Pulse: 89   Resp: 19 SpO2: 99 % O2 Device: None (Room air) Oxygen Delivery: 2 LPM    Vital Signs with Ranges  Temp:  [98.4  F (36.9  C)-98.7  F (37.1  C)] 98.5  F (36.9  C)  Pulse:  [86-92] 89  Resp:  [16-20] 19  BP: (75-95)/(64-67)   FiO2 (%):  [30 %] 30 %  SpO2:  [92 %-99 %] 99 %    LVAD Settings  Heartmate 3 LEFT VS  Flow (Lpm): 4.6 Lpm  Pulse Index (PI): 3.1 PI  Speed (rpm): 5200 rpm  Power (mari): 3.8 mari  Current Hct settin    Intake/Output    Intake/Output Summary (Last 24 hours) at 3/31/2023 0923  Last data filed at 3/31/2023 0837  Gross per 24 hour   Intake 700 ml   Output 2100 ml   Net -1400 ml       Weight  Vitals:    23 0046 23 0514 23 0554 23 0543   Weight: 83.5 kg (184 lb 1.4 oz) 83.8 kg (184 lb 11.9 oz) 75.8 kg (167 lb 1.7 oz) 75.6 kg (166 lb 10.7 oz)    23 0338   Weight: 79.2 kg (174 lb 9.7 oz)       FiO2 (%): 30 %  Resp: 19        BETA BLOCKER NOT PRESCRIBED           acetaminophen  975 mg Oral Q8H HOWIE     amiodarone  400 mg Oral BID     aspirin  81 mg Oral or NG Tube Daily     digoxin  250 mcg Oral Daily     empagliflozin  10 mg Oral Daily     gabapentin  300 mg Oral or Feeding Tube TID     insulin aspart  1-7 Units Subcutaneous TID AC     insulin aspart  1-5 Units Subcutaneous At Bedtime     lidocaine  1 patch Transdermal Q24H     lidocaine   Transdermal Q8H HOWIE     lisinopril  2.5 mg Oral Daily     magnesium oxide  400 mg Oral Q4H     pantoprazole  40 mg Oral QAM AC     piperacillin-tazobactam  4.5 g Intravenous Q6H     polyethylene glycol  17 g Oral BID     senna-docusate  2 tablet Oral BID     sodium chloride (PF)  3 mL Intracatheter Q8H     spironolactone  12.5 mg Oral Daily     vancomycin  1,000 mg Intravenous Q18H     warfarin ANTICOAGULANT  3 mg Oral ONCE at 18:00     Warfarin Therapy  "Reminder  1 each Oral See Admin Instructions        , Blood pressure (!) 81/67, pulse 89, temperature 98.5  F (36.9  C), temperature source Oral, resp. rate 19, height 1.702 m (5' 7\"), weight 79.2 kg (174 lb 9.7 oz), SpO2 99 %.  Constitutional: awake, alert, cooperative, no apparent distress, and appears stated age  Eyes: sclera clear, conjunctiva normal  Respiratory: No increased work of breathing, good air exchange, no wheezing  Cardiovascular: LVAD humm  GI: soft, non-distended, non-tender, no masses palpated  Skin: no bruising or bleeding  Neurologic: Awake, alert, oriented to name, place and time.  Cranial nerves II-XII are grossly intact.                Data   Recent Labs   Lab Test 03/31/23  0823 03/31/23  0434 03/30/23  2310 03/30/23  2041 03/30/23  1705 03/30/23  1148 03/30/23  0734 03/30/23  0413   NA  --  130*  130*  --  127*  --  127*  --  130*  130*   POTASSIUM  --  3.7  --   --   --  3.8  --  3.4   CHLORIDE  --  87*  --   --   --   --   --  85*   CO2  --  31*  --   --   --   --   --  35*   ANIONGAP  --  12  --   --   --   --   --  10   * 98   < >  --    < >  --    < > 123*   BUN  --  15.1  --   --   --   --   --  17.3   CR  --  1.10  --   --   --   --   --  1.21*   TONY  --  8.5*  --   --   --   --   --  8.3*    < > = values in this interval not displayed.       Lab Results   Component Value Date    WBC 20.6 03/31/2023    WBC 9.8 11/20/2019     Lab Results   Component Value Date    RBC 3.91 03/31/2023    RBC 4.70 11/20/2019     Lab Results   Component Value Date    HGB 11.6 03/31/2023    HGB 14.3 11/20/2019     Lab Results   Component Value Date    HCT 37.6 03/31/2023    HCT 45.3 11/20/2019     No components found for: MCT  Lab Results   Component Value Date    MCV 96 03/31/2023    MCV 96 11/20/2019     Lab Results   Component Value Date    MCH 29.7 03/31/2023    MCH 30.4 11/20/2019     Lab Results   Component Value Date    MCHC 30.9 03/31/2023    MCHC 31.6 11/20/2019     Lab Results "   Component Value Date    RDW 15.0 03/31/2023    RDW 13.0 11/20/2019     Lab Results   Component Value Date     03/31/2023     11/20/2019        Liver Function Studies -   Recent Labs   Lab Test 03/31/23  0434   PROTTOTAL 5.6*   ALBUMIN 2.9*   BILITOTAL 1.0   ALKPHOS 103   AST 50   ALT 31       Venous Blood Gas  Recent Labs   Lab 03/26/23  1135 03/26/23 0400 03/25/23 2340 03/25/23 2047   PHV 7.43 7.42 7.40 7.38   PCO2V 54* 57* 54* 60*   PO2V 43 38 39 30   HCO3V 35* 37* 34* 35*   SAUNDRA 9.4* 10.3* 7.4* 8.1*   O2PER 3 3 4 4       Arterial Blood Gas  Recent Labs   Lab 03/26/23  1135 03/26/23 0400 03/25/23 2340 03/25/23 2047 03/24/23 2006 03/24/23  1629   PH  --   --   --   --   --  7.42   PCO2  --   --   --   --   --  42   PO2  --   --   --   --   --  95   HCO3  --   --   --   --   --  27   O2PER 3 3 4 4   < > 30    < > = values in this interval not displayed.          No results found for this or any previous visit (from the past 24 hour(s)).    Blood culture:  Results for orders placed or performed during the hospital encounter of 03/15/23   Blood Culture Hand, Left    Specimen: Hand, Left; Blood   Result Value Ref Range    Culture No growth after 1 day    Blood Culture Hand, Right    Specimen: Hand, Right; Blood   Result Value Ref Range    Culture No growth after 1 day    Blood Culture Hand, Right    Specimen: Hand, Right; Blood   Result Value Ref Range    Culture No Growth    Blood Culture Line, venous    Specimen: Line, venous; Blood   Result Value Ref Range    Culture No Growth       Urine culture:  No results found for this or any previous visit.    Assessment & Plan     57 year old man with past medical history of HFrEF (EF 10% 5/2022) 2/2 NICM s/p ICD, tobacco use disorder, marijuana use, HTN, lumbar radiculopathy, CKD who presents after being found down at home. Found to have had a prolonged episode of VT with concern for cardiac arrest s/p ROSC in the field now s/p LVAD with HM3 on  03/23/23 and tricuspid annuloplasty.      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 Presented with out of hospital cardiac arrest with VT for 5 min 43 seconds of VT at 199 bpm, with spontaneous conversion to sinus rhythm without defibrillation on device interrogation on 3/15/2023.    - VT secondary prevention ICD in place  - Volume status: Hold diuretic for now, reassess in am   - BB: Hold PTA carvedilol 12.5 mg BID   - ACEi/ARB/ARNI: Lisinopril 2.5mg  - MRA: continue spironolactone 12.5 mg daily  - SGLT2i: Empaglaflozin 0mg daily   - SCD ppx/BiV pacer: ICD, reprogrammed device to provide ATP burst at VT threshold  - Heparin and coumadin when safe from surgical perspective  - Continue digoxin 250 mcg daily  -Amiodarone 400 mg BID until gets to 10 gram load (including the IV amiodraone he has received since admission), then 200 mg once daily thereafter      Patient evaluated with Dr. Marshall.      Marco Santoyo MD  Cardiology Fellow  [phone]

## 2023-03-31 NOTE — PROGRESS NOTES
Akshat, Landry and Oneil have completed three days of VAD Education. All three learners are learning well amidst the continuous interruptions and distractions of a busy hospital unit.    When Landry did the dressing change, there was a large amount of serosanguinous drainage soaking through the VAD exit site dressing. There was nothing to culture at the site. The redness where the driveline is sutured is less than 3/29.    PLAN: at least four more sessions of VAD Education from 4/3 - 4/6.     DSNG change:  Landry needs to do it on Akshat once more.  Oneil needs to do it on Akshat twice.    Akshat should be encouraged to make his own power changes and to put on his battery holster each time he gets up.

## 2023-03-31 NOTE — PLAN OF CARE
Neuro: A&Ox4. Able to make needs known.   Cardiac: SR. LVAD, Heartmate 3, PI 2.8-3.2, all other parameters WNL. Hematocrit adjusted to 38. System check done. Emergency Equipment at bedside. Drsg change. LVAD Teaching done today.    Respiratory: Sating >95% on 2L. Occasional cough, + yellow/clear sputum.  GI/: Adequate urine output, uses urinal independently. BM x1 today.   Diet/appetite: Tolerating Regular diet. 2 L F.R.  Activity:  Assist of 1, up to chair x3 for short periods. Walked with PT x1  Pain: C/O R Rib Pain. Adequate relief with PRN Oxy and dilaudid  Skin: Old CT sites to ABD, healing, cleansed with saline.   LDA's: PIV x1 to LFA.    Plan: Continue with POC. Notify primary team with changes.

## 2023-04-01 LAB
ALBUMIN SERPL BCG-MCNC: 2.7 G/DL (ref 3.5–5.2)
ALP SERPL-CCNC: 96 U/L (ref 40–129)
ALT SERPL W P-5'-P-CCNC: 26 U/L (ref 10–50)
ANION GAP SERPL CALCULATED.3IONS-SCNC: 11 MMOL/L (ref 7–15)
AST SERPL W P-5'-P-CCNC: 40 U/L (ref 10–50)
BILIRUB SERPL-MCNC: 0.9 MG/DL
BUN SERPL-MCNC: 12.9 MG/DL (ref 6–20)
CALCIUM SERPL-MCNC: 8.4 MG/DL (ref 8.6–10)
CHLORIDE SERPL-SCNC: 92 MMOL/L (ref 98–107)
CREAT SERPL-MCNC: 1.04 MG/DL (ref 0.67–1.17)
DEPRECATED HCO3 PLAS-SCNC: 27 MMOL/L (ref 22–29)
ERYTHROCYTE [DISTWIDTH] IN BLOOD BY AUTOMATED COUNT: 14.8 % (ref 10–15)
GFR SERPL CREATININE-BSD FRML MDRD: 85 ML/MIN/1.73M2
GLUCOSE BLDC GLUCOMTR-MCNC: 112 MG/DL (ref 70–99)
GLUCOSE BLDC GLUCOMTR-MCNC: 134 MG/DL (ref 70–99)
GLUCOSE BLDC GLUCOMTR-MCNC: 97 MG/DL (ref 70–99)
GLUCOSE SERPL-MCNC: 158 MG/DL (ref 70–99)
HCT VFR BLD AUTO: 34.4 % (ref 40–53)
HGB BLD-MCNC: 10.6 G/DL (ref 13.3–17.7)
INR PPP: 2.36 (ref 0.85–1.15)
MAGNESIUM SERPL-MCNC: 1.8 MG/DL (ref 1.7–2.3)
MCH RBC QN AUTO: 29.4 PG (ref 26.5–33)
MCHC RBC AUTO-ENTMCNC: 30.8 G/DL (ref 31.5–36.5)
MCV RBC AUTO: 95 FL (ref 78–100)
PHOSPHATE SERPL-MCNC: 2.6 MG/DL (ref 2.5–4.5)
PLATELET # BLD AUTO: 469 10E3/UL (ref 150–450)
POTASSIUM SERPL-SCNC: 4.1 MMOL/L (ref 3.4–5.3)
PROT SERPL-MCNC: 5.3 G/DL (ref 6.4–8.3)
RBC # BLD AUTO: 3.61 10E6/UL (ref 4.4–5.9)
SODIUM SERPL-SCNC: 129 MMOL/L (ref 136–145)
SODIUM SERPL-SCNC: 130 MMOL/L (ref 136–145)
SODIUM SERPL-SCNC: 130 MMOL/L (ref 136–145)
SODIUM SERPL-SCNC: 132 MMOL/L (ref 136–145)
VANCOMYCIN SERPL-MCNC: 14.2 UG/ML
WBC # BLD AUTO: 18.7 10E3/UL (ref 4–11)

## 2023-04-01 PROCEDURE — 250N000011 HC RX IP 250 OP 636: Performed by: STUDENT IN AN ORGANIZED HEALTH CARE EDUCATION/TRAINING PROGRAM

## 2023-04-01 PROCEDURE — 36415 COLL VENOUS BLD VENIPUNCTURE: CPT | Performed by: INTERNAL MEDICINE

## 2023-04-01 PROCEDURE — 250N000011 HC RX IP 250 OP 636: Performed by: PHYSICIAN ASSISTANT

## 2023-04-01 PROCEDURE — 250N000013 HC RX MED GY IP 250 OP 250 PS 637

## 2023-04-01 PROCEDURE — 120N000003 HC R&B IMCU UMMC

## 2023-04-01 PROCEDURE — 80202 ASSAY OF VANCOMYCIN: CPT | Performed by: INTERNAL MEDICINE

## 2023-04-01 PROCEDURE — 83735 ASSAY OF MAGNESIUM: CPT | Performed by: INTERNAL MEDICINE

## 2023-04-01 PROCEDURE — 250N000011 HC RX IP 250 OP 636: Performed by: INTERNAL MEDICINE

## 2023-04-01 PROCEDURE — 250N000013 HC RX MED GY IP 250 OP 250 PS 637: Performed by: STUDENT IN AN ORGANIZED HEALTH CARE EDUCATION/TRAINING PROGRAM

## 2023-04-01 PROCEDURE — 250N000013 HC RX MED GY IP 250 OP 250 PS 637: Performed by: PHYSICIAN ASSISTANT

## 2023-04-01 PROCEDURE — 36415 COLL VENOUS BLD VENIPUNCTURE: CPT | Performed by: PHYSICIAN ASSISTANT

## 2023-04-01 PROCEDURE — 999N000127 HC STATISTIC PERIPHERAL IV START W US GUIDANCE

## 2023-04-01 PROCEDURE — 250N000013 HC RX MED GY IP 250 OP 250 PS 637: Performed by: INTERNAL MEDICINE

## 2023-04-01 PROCEDURE — 84295 ASSAY OF SERUM SODIUM: CPT | Performed by: PHYSICIAN ASSISTANT

## 2023-04-01 PROCEDURE — 85610 PROTHROMBIN TIME: CPT | Performed by: STUDENT IN AN ORGANIZED HEALTH CARE EDUCATION/TRAINING PROGRAM

## 2023-04-01 PROCEDURE — 85027 COMPLETE CBC AUTOMATED: CPT | Performed by: PHYSICIAN ASSISTANT

## 2023-04-01 PROCEDURE — 99291 CRITICAL CARE FIRST HOUR: CPT | Mod: 25 | Performed by: INTERNAL MEDICINE

## 2023-04-01 PROCEDURE — 93750 INTERROGATION VAD IN PERSON: CPT | Performed by: INTERNAL MEDICINE

## 2023-04-01 PROCEDURE — 80053 COMPREHEN METABOLIC PANEL: CPT | Performed by: PHYSICIAN ASSISTANT

## 2023-04-01 PROCEDURE — 84100 ASSAY OF PHOSPHORUS: CPT | Performed by: INTERNAL MEDICINE

## 2023-04-01 RX ORDER — AMIODARONE HYDROCHLORIDE 200 MG/1
200 TABLET ORAL DAILY
Status: DISCONTINUED | OUTPATIENT
Start: 2023-04-02 | End: 2023-04-09 | Stop reason: HOSPADM

## 2023-04-01 RX ORDER — MAGNESIUM SULFATE HEPTAHYDRATE 40 MG/ML
2 INJECTION, SOLUTION INTRAVENOUS ONCE
Status: COMPLETED | OUTPATIENT
Start: 2023-04-01 | End: 2023-04-01

## 2023-04-01 RX ORDER — VANCOMYCIN HYDROCHLORIDE 1 G/200ML
1000 INJECTION, SOLUTION INTRAVENOUS EVERY 12 HOURS
Status: DISCONTINUED | OUTPATIENT
Start: 2023-04-01 | End: 2023-04-03

## 2023-04-01 RX ORDER — WARFARIN SODIUM 2.5 MG/1
2.5 TABLET ORAL
Status: COMPLETED | OUTPATIENT
Start: 2023-04-01 | End: 2023-04-01

## 2023-04-01 RX ADMIN — ACETAMINOPHEN 975 MG: 325 TABLET, FILM COATED ORAL at 07:05

## 2023-04-01 RX ADMIN — PIPERACILLIN SODIUM AND TAZOBACTAM SODIUM 4.5 G: 4; .5 INJECTION, POWDER, LYOPHILIZED, FOR SOLUTION INTRAVENOUS at 09:05

## 2023-04-01 RX ADMIN — PIPERACILLIN SODIUM AND TAZOBACTAM SODIUM 4.5 G: 4; .5 INJECTION, POWDER, LYOPHILIZED, FOR SOLUTION INTRAVENOUS at 22:52

## 2023-04-01 RX ADMIN — LIDOCAINE PATCH 4% 1 PATCH: 40 PATCH TOPICAL at 08:28

## 2023-04-01 RX ADMIN — AMIODARONE HYDROCHLORIDE 400 MG: 200 TABLET ORAL at 19:46

## 2023-04-01 RX ADMIN — DIGOXIN 250 MCG: 125 TABLET ORAL at 08:28

## 2023-04-01 RX ADMIN — MAGNESIUM SULFATE IN WATER 2 G: 40 INJECTION, SOLUTION INTRAVENOUS at 11:14

## 2023-04-01 RX ADMIN — PANTOPRAZOLE SODIUM 40 MG: 40 TABLET, DELAYED RELEASE ORAL at 08:28

## 2023-04-01 RX ADMIN — PIPERACILLIN SODIUM AND TAZOBACTAM SODIUM 4.5 G: 4; .5 INJECTION, POWDER, LYOPHILIZED, FOR SOLUTION INTRAVENOUS at 03:35

## 2023-04-01 RX ADMIN — WARFARIN SODIUM 2.5 MG: 2.5 TABLET ORAL at 18:03

## 2023-04-01 RX ADMIN — ASPIRIN 81 MG 81 MG: 81 TABLET ORAL at 08:28

## 2023-04-01 RX ADMIN — LISINOPRIL 2.5 MG: 2.5 TABLET ORAL at 08:28

## 2023-04-01 RX ADMIN — ACETAMINOPHEN 975 MG: 325 TABLET, FILM COATED ORAL at 16:02

## 2023-04-01 RX ADMIN — EMPAGLIFLOZIN 10 MG: 10 TABLET, FILM COATED ORAL at 08:29

## 2023-04-01 RX ADMIN — OXYCODONE HYDROCHLORIDE 10 MG: 10 TABLET ORAL at 11:14

## 2023-04-01 RX ADMIN — OXYCODONE HYDROCHLORIDE 10 MG: 10 TABLET ORAL at 02:36

## 2023-04-01 RX ADMIN — GABAPENTIN 300 MG: 300 CAPSULE ORAL at 08:28

## 2023-04-01 RX ADMIN — VANCOMYCIN HYDROCHLORIDE 1000 MG: 1 INJECTION, SOLUTION INTRAVENOUS at 12:49

## 2023-04-01 RX ADMIN — GABAPENTIN 300 MG: 300 CAPSULE ORAL at 19:46

## 2023-04-01 RX ADMIN — OXYCODONE HYDROCHLORIDE 10 MG: 10 TABLET ORAL at 16:02

## 2023-04-01 RX ADMIN — AMIODARONE HYDROCHLORIDE 400 MG: 200 TABLET ORAL at 08:28

## 2023-04-01 RX ADMIN — SPIRONOLACTONE 12.5 MG: 25 TABLET, FILM COATED ORAL at 08:29

## 2023-04-01 RX ADMIN — OXYCODONE HYDROCHLORIDE 10 MG: 10 TABLET ORAL at 06:39

## 2023-04-01 RX ADMIN — HYDROMORPHONE HYDROCHLORIDE 0.2 MG: 0.2 INJECTION, SOLUTION INTRAMUSCULAR; INTRAVENOUS; SUBCUTANEOUS at 22:52

## 2023-04-01 RX ADMIN — GABAPENTIN 300 MG: 300 CAPSULE ORAL at 13:00

## 2023-04-01 RX ADMIN — OXYCODONE HYDROCHLORIDE 10 MG: 10 TABLET ORAL at 20:08

## 2023-04-01 RX ADMIN — PIPERACILLIN SODIUM AND TAZOBACTAM SODIUM 4.5 G: 4; .5 INJECTION, POWDER, LYOPHILIZED, FOR SOLUTION INTRAVENOUS at 16:01

## 2023-04-01 ASSESSMENT — ACTIVITIES OF DAILY LIVING (ADL)
ADLS_ACUITY_SCORE: 27

## 2023-04-01 NOTE — PROGRESS NOTES
Cardiovascular Surgery Progress Note  04/01/2023         Assessment and Plan:     57 year old male with PMH of HFrEF (10%) secondary to NICM (suspected viral etiology) s/p ICD, chronic tobacco/marijuana use, COPD, HTN, CKD stage III, who presented to [facility] on 3/15 after being found down from a VT arrest. CPR was performed by family. Spontaneously converted to NSR without defibrillation. Down time suspected to be ~ 6 minutes. Presents to [facility] for LVAD placement with preop IABP by Dr. Matias on 3/23/23. He is now s/p LVAD (Heartmate III) placement, TVR, and PFO repair with Dr. Matias and Dr. Waller 3/23/22.    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 RV is mildly dilated and RV function is probably normal on milrinone with moderate to severe TR (there is reversal of hepatic venous flow). There is a swan in place which may be inhibiting coaptation. Trace AI. Mild MR. No MS. There is a continuous left to right shunt across a PFO.  Post-bypass JOAN: No AI. The aortic valve opens 1:3 cardiac cycles. No residual PFO. Atrial septum bulges into the left atrium. TR ring, mild to moderate TR. RV function mildly reduced on epi/norepi/vaso nitric. Inflow cannula is well positioned with low velocities. There is no visible aortic dissection.    HD stable. MAPS 60-70. Low PIs overnight, likely due to hypovolemia per cardiology. Hold diuresis as below.  Most recent echo showed LV EF 10%  - ASA 81 mg daily  - BB not indicated  - lisinopril 2.5 mg daily  - digoxin 250 mcg daily  - spironolactone 12.5 mg daily  - Diuresis as below    Atrial  fibrillation with RVR 3/28, now NSR  - amiodarone infusion 0.5 mg/h started 3/28 per cards, ongoing afib RVR so amiodarone increased to 1 mg/h  - transitioned to PO amiodarone 400 mg BID 3/29      Pulmonary:  Extubated POD 1 to 4 lpm via NC. Now saturating well on room air.   - Supplemental O2 PRN to keep sats > 92%. Wean off as tolerated.  - Pulm hygiene, IS, activity and deep breathing    Neurology / MSK:  Acute post-operative pain   Pain well controlled with current regimen:  - Scheduled: Tylenol   - PRN: IV Hydromorphone, PO Oxycodone, methocarbamol      / Renal / Fluid / Electrolytes:  Hyponatremia, likely due to volume overload -- diuresis as below  Most recent creatinine 1.21; adequate UOP.   FLUID STATUS: Pre-op weight 172 lb, weight today 167-6 lb; I/O past 24 hours: net even  - Diuresis per cardiology  - Replete lytes per protocol  - Strict I/O, daily weights  - Avoid/limit nephrotoxins as able  Urinary retention post-op, resolved  - remove muro 3/30    GI / Nutrition:   Elevated LFTs, improving  - Tolerating regular diet  - Continue bowel regimen, last BM 3/30    Endocrine:  Stress induced hyperglycemia   Hgb A1c 5.6  - Initially managed on insulin drip postop, transitioned to medium sliding scale; goal BG <180 for optimal healing    Infectious Disease:  Stress induced leukocytosis  Concern for pneumonia  WBC 18.7 and trending down, remains afebrile  - CRP elevated, procal elevated to 0.54 --> 0.49  - CXR with left consolidation concerning for pneumonia and patient complains of cough   - UA, blood cultures, and sputum sent 3/29 -- UA neg, blood cultures all NGTD  - start empiric IV vancomycin and Zosyn for likely HAP versus alternate post-operative infection   - Follow up CXR 3/31 ordered   - If WBC continues to up-trend, consider CT chest later this week per Dr. Matias  - Completed perioperative antibiotics  - Continue to monitor fever curve, CBC    Hematology:   Acute blood loss anemia and  thrombocytopenia  Hgb stable 11-12; Plt WNL, no signs or symptoms of active bleeding  - CBC daily    Anticoagulation:   - ASA 81 mg  - Coumadin for LVAD with a goal INR of 2-3, INR therapeutic    MSK/Skin:  Sternotomy  Surgical incision  - Sternal precautions  - Incisional cares per protocol    Prophylaxis:   - Stress ulcer prophylaxis: Pantoprazole 40 mg daily for 30 days  - DVT prophylaxis: Subcutaneous heparin, SCD    Disposition:   - Transferred to  on 3/26  - Therapies recommending discharge to ARU, may progress to home      VERONICA White, ACNPC-AG, CCRN  Nurse Practitioner  Cardiothoracic Surgery  Pager: [phone]            Interval History:     No acute events overnight. States pain is well managed on current regimen, slept poorly. Breathing is stable on room air, working with IS. Tolerating diet, is passing flatus, +BM, no n/v. Denies chest pain, palpitations, dizziness, syncopal symptoms, chills, myalgias, sternal popping/clicking.         Physical Exam:     Gen: A&Ox4, NAD  Neuro: Intact no focal deficits   CV: LVAD hum  Pulm: CTA, no wheezing or rhonchi, normal breathing on RA  Abd: nondistended, normal BS, soft, nontender  Ext: 1+ peripheral edema  Incision: clean, dry, intact, no erythema, sternum stable  Tubes/drain sites: dressing clean and dry  LVAD dressing clean, dry, intact.   Speed: 5200, Power: 3.6, Flow: 4.3, PI: 3.5         Data:    Imaging:  reviewed recent imaging, no acute concerns    Recent Results (from the past 24 hour(s))   XR Chest 2 Views    Narrative    Chest 2 views    INDICATION: Follow consolidation    COMPARISON: 3/29/2023    FINDINGS: Heart size normal. LVAD. Implantable cardiac defibrillator.  Lungs and pulmonary vasculature otherwise appear grossly unchanged.  Mildly prominent thoracic kyphosis.      Impression    IMPRESSION: Unchanged possible left lower lobe  consolidation/atelectasis with possible superimposed pleural effusion.  This area is obscured by the LVAD  the frontal view but persists  unchanged on the lateral view.    FANNY MELARA MD         SYSTEM ID:  [id number]        Labs:  Recent Labs   Lab 04/01/23  1112 04/01/23  1056 04/01/23  0842 03/31/23  2230 03/31/23  1933 03/31/23  0823 03/31/23  0434 03/30/23  1705 03/30/23  1148 03/30/23  0734 03/30/23  0413   WBC  --   --  18.7*  --   --   --  20.6*  --   --   --  19.9*   HGB  --   --  10.6*  --   --   --  11.6*  --   --   --  11.0*   MCV  --   --  95  --   --   --  96  --   --   --  95   PLT  --   --  469*  --   --   --  412  --   --   --  315   INR  --   --  2.36*  --   --   --  2.11*  --   --   --  2.19*   NA  --  129* 130*  130*  --  129*   < > 130*  130*   < > 127*  --  130*  130*   POTASSIUM  --   --  4.1  --   --   --  3.7  --  3.8  --  3.4   CHLORIDE  --   --  92*  --   --   --  87*  --   --   --  85*   CO2  --   --  27  --   --   --  31*  --   --   --  35*   BUN  --   --  12.9  --   --   --  15.1  --   --   --  17.3   CR  --   --  1.04  --   --   --  1.10  --   --   --  1.21*   ANIONGAP  --   --  11  --   --   --  12  --   --   --  10   TONY  --   --  8.4*  --   --   --  8.5*  --   --   --  8.3*   *  --  158* 104*  --    < > 98   < >  --    < > 123*   ALBUMIN  --   --  2.7*  --   --   --  2.9*  --   --   --  2.6*   PROTTOTAL  --   --  5.3*  --   --   --  5.6*  --   --   --  5.2*   BILITOTAL  --   --  0.9  --   --   --  1.0  --   --   --  1.2   ALKPHOS  --   --  96  --   --   --  103  --   --   --  97   ALT  --   --  26  --   --   --  31  --   --   --  32   AST  --   --  40  --   --   --  50  --   --   --  64*    < > = values in this interval not displayed.      GLUCOSE:   Recent Labs   Lab 04/01/23  1112 04/01/23  0842 03/31/23  2230 03/31/23  1722 03/31/23  1239 03/31/23  0823   * 158* 104* 106* 106* 123*

## 2023-04-01 NOTE — PHARMACY-VANCOMYCIN DOSING SERVICE
"Pharmacy Vancomycin Note  Date of Service 2023  Patient's  1965   57 year old, male    Indication: Postoperative Infection  Day of Therapy: 4  Current vancomycin regimen:  1000 mg IV q18h  Current vancomycin monitoring method: AUC  Current vancomycin therapeutic monitoring goal: 400-600 mg*h/L    InsightRX Prediction of Current Vancomycin Regimen  Loading dose: N/A  Regimen: 1000 mg IV every 18 hours.  Start time: 12:15 on 2023  Exposure target: AUC24 (range)400-600 mg/L.hr   AUC24,ss: 376 mg/L.hr  Probability of AUC24 > 400: 35 %  Ctrough,ss: 11.9 mg/L  Probability of Ctrough,ss > 20: 1 %  Probability of nephrotoxicity (Lodise JERONIMO ): 7 %    Current estimated CrCl = Estimated Creatinine Clearance: 89.1 mL/min (based on SCr of 1.04 mg/dL).    Creatinine for last 3 days  3/30/2023:  4:13 AM Creatinine 1.21 mg/dL  3/31/2023:  4:34 AM Creatinine 1.10 mg/dL  2023:  8:42 AM Creatinine 1.04 mg/dL    Recent Vancomycin Levels (past 3 days)  3/31/2023:  5:51 PM Vancomycin 13.1 ug/mL  2023:  8:42 AM Vancomycin 14.2 ug/mL    Vancomycin IV Administrations (past 72 hours)                   vancomycin (VANCOCIN) 1000 mg in dextrose 5% 200 mL PREMIX (mg) 1,000 mg New Bag 23 1724     1,000 mg New Bag  0031    vancomycin (VANCOCIN) 1000 mg in dextrose 5% 200 mL PREMIX (mg) 1,000 mg New Bag 23 0426     1,000 mg New Bag 23 1827                Nephrotoxins and other renal medications (From now, onward)    Start     Dose/Rate Route Frequency Ordered Stop    23 2230  vancomycin (VANCOCIN) 1000 mg in dextrose 5% 200 mL PREMIX         1,000 mg  200 mL/hr over 1 Hours Intravenous EVERY 18 HOURS 23 0733      23 1600  piperacillin-tazobactam (ZOSYN) 4.5 g vial to attach to  mL bag        Note to Pharmacy: For [facility], [facility] and [facility]: For Zosyn-naive patients, use the \"Zosyn initial dose + extended infusion\" order panel.    4.5 g  over 30 Minutes Intravenous EVERY 6 HOURS " 03/29/23 1508      03/28/23 1230  lisinopril (ZESTRIL) tablet 2.5 mg         2.5 mg Oral DAILY 03/28/23 1226               Contrast Orders - past 72 hours (72h ago, onward)    None          Interpretation of levels and current regimen:  Vancomycin level is reflective of AUC less than 400    Has serum creatinine changed greater than 50% in last 72 hours: No    Urine output:  good urine output    Renal Function: Stable    InsightRX Prediction of Planned New Vancomycin Regimen  Loading dose: N/A  Regimen: 1000 mg IV every 12 hours.  Start time: 12:15 on 04/01/2023  Exposure target: AUC24 (range)400-600 mg/L.hr   AUC24,ss: 555 mg/L.hr  Probability of AUC24 > 400: 98 %  Ctrough,ss: 19.3 mg/L  Probability of Ctrough,ss > 20: 43 %  Probability of nephrotoxicity (Lodise JERONIMO 2009): 16 %    Plan:  1. Increase Dose to 1000mg q12h  2. Vancomycin monitoring method: AUC  3. Vancomycin therapeutic monitoring goal: 400-600 mg*h/L  4. Pharmacy will check vancomycin levels as appropriate in 1-3 Days.  5. Serum creatinine levels will be ordered daily for the first week of therapy and at least twice weekly for subsequent weeks.    Jesus Clement [facility]

## 2023-04-01 NOTE — CONSULTS
Cardiology Progress Note       Changes Today:   -Agree with continuing to hold diuresis'  -stop SGLT2i  -change amio to 200 mg daily starting tomorrow    Physical Exam   Temp: 98.3  F (36.8  C) Temp src: Oral BP: (!) 78/39 Pulse: 84   Resp: 16 SpO2: 95 % O2 Device: None (Room air) Oxygen Delivery: 2 LPM    Vital Signs with Ranges  Temp:  [97.9  F (36.6  C)-98.4  F (36.9  C)] 98.3  F (36.8  C)  Pulse:  [83-98] 84  Resp:  [15-19] 16  BP: (78)/(39) 78/39  SpO2:  [94 %-100 %] 95 %    LVAD Settings  Heartmate 3 LEFT VS  Flow (Lpm): 4.6 Lpm  Pulse Index (PI): 3.1 PI  Speed (rpm): 5200 rpm  Power (mari): 3.8 mari  Current Hct settin    Intake/Output      Intake/Output Summary (Last 24 hours) at 2023 1218  Last data filed at 2023 0800  Gross per 24 hour   Intake 1117 ml   Output 2725 ml   Net -1608 ml         Weight  Vitals:    23 0554 23 0543 23 0338 23 1400   Weight: 75.8 kg (167 lb 1.7 oz) 75.6 kg (166 lb 10.7 oz) 79.2 kg (174 lb 9.7 oz) 77.7 kg (171 lb 4.8 oz)    23 0707   Weight: 78.5 kg (173 lb 1 oz)       FiO2 (%): 30 %  Resp: 16        BETA BLOCKER NOT PRESCRIBED           acetaminophen  975 mg Oral Q8H HOWIE     [START ON 2023] amiodarone  200 mg Oral Daily     amiodarone  400 mg Oral BID     aspirin  81 mg Oral or NG Tube Daily     digoxin  250 mcg Oral Daily     gabapentin  300 mg Oral or Feeding Tube TID     insulin aspart  1-7 Units Subcutaneous TID AC     insulin aspart  1-5 Units Subcutaneous At Bedtime     lidocaine  1 patch Transdermal Q24H     lidocaine   Transdermal Q8H HOWIE     lisinopril  2.5 mg Oral Daily     pantoprazole  40 mg Oral QAM AC     piperacillin-tazobactam  4.5 g Intravenous Q6H     polyethylene glycol  17 g Oral BID     senna-docusate  2 tablet Oral BID     sodium chloride (PF)  3 mL Intracatheter Q8H     spironolactone  12.5 mg Oral Daily     vancomycin  1,000 mg Intravenous Q18H     Warfarin Therapy Reminder  1 each Oral See Admin  "Instructions        , Blood pressure (!) 78/39, pulse 84, temperature 98.3  F (36.8  C), temperature source Oral, resp. rate 16, height 1.702 m (5' 7\"), weight 78.5 kg (173 lb 1 oz), SpO2 95 %.     Constitutional: awake, alert, cooperative, no apparent distress, and appears stated age  Eyes: sclera clear, conjunctiva normal  Respiratory: No increased work of breathing, good air exchange, no wheezing  Cardiovascular: LVAD humm  GI: soft, non-distended, non-tender, no masses palpated  Skin: no bruising or bleeding  Neurologic: Awake, alert, oriented to name, place and time.  Cranial nerves II-XII are grossly intact.                Data   Recent Labs   Lab Test 03/31/23  0823 03/31/23  0434 03/30/23  2310 03/30/23  2041 03/30/23  1705 03/30/23  1148 03/30/23  0734 03/30/23  0413   NA  --  130*  130*  --  127*  --  127*  --  130*  130*   POTASSIUM  --  3.7  --   --   --  3.8  --  3.4   CHLORIDE  --  87*  --   --   --   --   --  85*   CO2  --  31*  --   --   --   --   --  35*   ANIONGAP  --  12  --   --   --   --   --  10   * 98   < >  --    < >  --    < > 123*   BUN  --  15.1  --   --   --   --   --  17.3   CR  --  1.10  --   --   --   --   --  1.21*   TONY  --  8.5*  --   --   --   --   --  8.3*    < > = values in this interval not displayed.       Lab Results   Component Value Date    WBC 20.6 03/31/2023    WBC 9.8 11/20/2019     Lab Results   Component Value Date    RBC 3.91 03/31/2023    RBC 4.70 11/20/2019     Lab Results   Component Value Date    HGB 11.6 03/31/2023    HGB 14.3 11/20/2019     Lab Results   Component Value Date    HCT 37.6 03/31/2023    HCT 45.3 11/20/2019     No components found for: MCT  Lab Results   Component Value Date    MCV 96 03/31/2023    MCV 96 11/20/2019     Lab Results   Component Value Date    MCH 29.7 03/31/2023    MCH 30.4 11/20/2019     Lab Results   Component Value Date    MCHC 30.9 03/31/2023    MCHC 31.6 11/20/2019     Lab Results   Component Value Date    RDW 15.0 " 03/31/2023    RDW 13.0 11/20/2019     Lab Results   Component Value Date     03/31/2023     11/20/2019        Liver Function Studies -   Recent Labs   Lab Test 03/31/23  0434   PROTTOTAL 5.6*   ALBUMIN 2.9*   BILITOTAL 1.0   ALKPHOS 103   AST 50   ALT 31       Venous Blood Gas  Recent Labs   Lab 03/26/23  1135 03/26/23  0400 03/25/23  2340 03/25/23  2047   PHV 7.43 7.42 7.40 7.38   PCO2V 54* 57* 54* 60*   PO2V 43 38 39 30   HCO3V 35* 37* 34* 35*   SAUNDRA 9.4* 10.3* 7.4* 8.1*   O2PER 3 3 4 4       Arterial Blood Gas  Recent Labs   Lab 03/26/23  1135 03/26/23  0400 03/25/23  2340 03/25/23 2047   O2PER 3 3 4 4          Recent Results (from the past 24 hour(s))   XR Chest 2 Views    Narrative    Chest 2 views    INDICATION: Follow consolidation    COMPARISON: 3/29/2023    FINDINGS: Heart size normal. LVAD. Implantable cardiac defibrillator.  Lungs and pulmonary vasculature otherwise appear grossly unchanged.  Mildly prominent thoracic kyphosis.      Impression    IMPRESSION: Unchanged possible left lower lobe  consolidation/atelectasis with possible superimposed pleural effusion.  This area is obscured by the LVAD the frontal view but persists  unchanged on the lateral view.    FANNY MELARA MD         SYSTEM ID:  [id number]       Blood culture:  Results for orders placed or performed during the hospital encounter of 03/15/23   Blood Culture Hand, Left    Specimen: Hand, Left; Blood   Result Value Ref Range    Culture No growth after 2 days    Blood Culture Hand, Right    Specimen: Hand, Right; Blood   Result Value Ref Range    Culture No growth after 2 days    Blood Culture Hand, Right    Specimen: Hand, Right; Blood   Result Value Ref Range    Culture No Growth    Blood Culture Line, venous    Specimen: Line, venous; Blood   Result Value Ref Range    Culture No Growth       Urine culture:  No results found for this or any previous visit.    Assessment & Plan     57 year old man with past medical  history of HFrEF (EF 10% 5/2022) 2/2 NICM s/p ICD, tobacco use disorder, marijuana use, HTN, lumbar radiculopathy, CKD who presents after being found down at home. Found to have had a prolonged episode of VT with concern for cardiac arrest s/p ROSC in the field now s/p LVAD with HM3 on 03/23/23 and tricuspid annuloplasty.      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 Presented with out of hospital cardiac arrest with VT for 5 min 43 seconds of VT at 199 bpm, with spontaneous conversion to sinus rhythm without defibrillation on device interrogation on 3/15/2023.    - VT secondary prevention ICD in place  - Volume status: Hold diuretic for now, reassess in am   - BB: Hold PTA carvedilol 12.5 mg BID   - ACEi/ARB/ARNI: Lisinopril 2.5mg  - MRA: continue spironolactone 12.5 mg daily  - SGLT2i: discontinue Empaglaflozin   - SCD ppx/BiV pacer: ICD, reprogrammed device to provide ATP burst at VT threshold  - Heparin and coumadin when safe from surgical perspective  - Continue digoxin 250 mcg daily  -Amiodarone 200 mg daily starting tomorrow      Patient evaluated with Dr. Aguiar.     Vince Stauffer MD, MSc  Cardiovascular Disease Fellow  [facility]

## 2023-04-01 NOTE — PLAN OF CARE
Neuro: A&Ox4. Able to make needs known.   Cardiac: SR. VSS. Doppler Maps. Heartmate 3 LVAD, numbers WNL. System check done. Drsg changed.   Respiratory: Sating >95% on RA, spot checking. Denies any SOB.   GI/: Adequate urine output. Uses Urinal. BM x1 this shift.   Diet/appetite: Tolerating Regular diet. 2L FR  Activity:  Independent/SBA. Up to chair with encouragement x2. Encouraged to walk several times today, declined. Says he will maybe try later tonight.   Pain: C/O L Rib pain. At acceptable level on current regimen.   Skin: No new deficits noted.  LDA's: LVAD. PIV to LFA, Saline locked.     Plan: Continue with POC. Notify primary team with changes.

## 2023-04-01 NOTE — PROGRESS NOTES
Cardiovascular Surgery Progress Note  04/01/2023         Assessment and Plan:     57 year old male with PMH of HFrEF (10%) secondary to NICM (suspected viral etiology) s/p ICD, chronic tobacco/marijuana use, COPD, HTN, CKD stage III, who presented to [facility] on 3/15 after being found down from a VT arrest. CPR was performed by family. Spontaneously converted to NSR without defibrillation. Down time suspected to be ~ 6 minutes. Presents to [facility] for LVAD placement with preop IABP by Dr. Matias on 3/23/23. He is now s/p LVAD (Heartmate III) placement, TVR, and PFO repair with Dr. Matias and Dr. Waller 3/23/22.    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 RV is mildly dilated and RV function is probably normal on milrinone with moderate to severe TR (there is reversal of hepatic venous flow). There is a swan in place which may be inhibiting coaptation. Trace AI. Mild MR. No MS. There is a continuous left to right shunt across a PFO.  Post-bypass JOAN: No AI. The aortic valve opens 1:3 cardiac cycles. No residual PFO. Atrial septum bulges into the left atrium. TR ring, mild to moderate TR. RV function mildly reduced on epi/norepi/vaso nitric. Inflow cannula is well positioned with low velocities. There is no visible aortic dissection.    HD stable. MAPS 60-70. Low PIs overnight, likely due to hypovolemia per cardiology. Hold diuresis as below.  Most recent echo showed LV EF 10%  - ASA 81 mg daily  - BB not indicated  - captopril 6.25 mg BID  - digoxin 250 mcg daily  - spironolactone 12.5 mg daily  - Diuresis as below    Atrial  fibrillation with RVR 3/28, now NSR  - amiodarone infusion 0.5 mg/h started 3/28 per cards, ongoing afib RVR so amiodarone increased to 1 mg/h  - transitioned to PO amiodarone 400 mg BID 3/29    Chest tubes: L pleural removed 3/27, mediastinals x2 removed 3/28  TPW: NA    Pulmonary:  Extubated POD 1 to 4 lpm via NC. Now saturating well on room air.   - Supplemental O2 PRN to keep sats > 92%. Wean off as tolerated.  - Pulm hygiene, IS, activity and deep breathing    Neurology / MSK:  Acute post-operative pain   Pain well controlled with current regimen:  - Scheduled: Tylenol   - PRN: IV Hydromorphone, PO Oxycodone, methocarbamol      / Renal / Fluid / Electrolytes:  Hyponatremia, likely due to volume overload -- diuresis as below  Most recent creatinine 1.21; adequate UOP.   FLUID STATUS: Pre-op weight 172 lb, weight today 167-6 lb; I/O past 24 hours: net even  - Diuresis IV Bumex 2 mg BID per cardiology, held AM dose 3/29 and transitioned to PO Bumex 3 mg BID per cardiology recs. Bumex held 3/30 due to suspected hypovolemia (discussed with cards)  - Replete lytes per protocol  - Strict I/O, daily weights  - Avoid/limit nephrotoxins as able  Urinary retention post-op  - remove muro 3/30    GI / Nutrition:   Elevated LFTs, improving  - Tolerating regular diet  - Continue bowel regimen, last BM 3/30    Endocrine:  Stress induced hyperglycemia   Hgb A1c 5.6  - Initially managed on insulin drip postop, transitioned to medium sliding scale; goal BG <180 for optimal healing    Infectious Disease:  Stress induced leukocytosis  WBC 19.9 and trending up, remains afebrile  - CRP elevated, procal elevated to 0.54 --> 0.49  - CXR with left consolidation concerning for pneumonia and patient complains of cough   - UA, blood cultures, and sputum sent 3/29 -- UA neg, blood cultures NGTD  - start empiric IV vancomycin and Zosyn for likely HAP versus alternate post-operative infection   - Follow up CXR 3/31 ordered   - If WBC  continues to up-trend, consider CT chest later this week per Dr. Matias  - Completed perioperative antibiotics  - Continue to monitor fever curve, CBC    Hematology:   Acute blood loss anemia and thrombocytopenia  Hgb stable 11-12; Plt WNL, no signs or symptoms of active bleeding  - CBC daily    Anticoagulation:   - ASA 81 mg  - Coumadin for LVAD with a goal INR of 2-3, INR therapeutic    MSK/Skin:  Sternotomy  Surgical incision  - Sternal precautions  - Incisional cares per protocol    Prophylaxis:   - Stress ulcer prophylaxis: Pantoprazole 40 mg daily for 30 days  - DVT prophylaxis: Subcutaneous heparin, SCD    Disposition:   - Transferred to  on 3/26  - Therapies recommending discharge to ARU, may progress to home    Discussed with Dr. Florencio Rashid NP on 3/30/2023 at 6:25 PM            Interval History:     No acute events overnight. One episode of low MAPs. States pain is well managed on current regimen, slept well. Breathing is stable on , working with IS. Tolerating diet, is passing flatus, +BM, no n/v. Denies chest pain, palpitations, dizziness, syncopal symptoms, chills, myalgias, sternal popping/clicking.         Physical Exam:     Gen: A&Ox4, NAD  Neuro: no focal deficits   CV: LVAD hum  Pulm: CTA, no wheezing or rhonchi, normal breathing on RA  Abd: nondistended, normal BS, soft, nontender  Ext: 1+ peripheral edema  Incision: clean, dry, intact, no erythema, sternum stable  Tubes/drain sites: dressing clean and dry  LVAD dressing clean, dry, intact.   Speed: 5200, Power: 3.5-3.7, Flow: 4.5-5, PI: 2.1-3.1          Data:    Imaging:  reviewed recent imaging, no acute concerns    Recent Results (from the past 24 hour(s))   XR Chest 2 Views    Narrative    Chest 2 views    INDICATION: Follow consolidation    COMPARISON: 3/29/2023    FINDINGS: Heart size normal. LVAD. Implantable cardiac defibrillator.  Lungs and pulmonary vasculature otherwise appear grossly unchanged.  Mildly prominent thoracic  kyphosis.      Impression    IMPRESSION: Unchanged possible left lower lobe  consolidation/atelectasis with possible superimposed pleural effusion.  This area is obscured by the LVAD the frontal view but persists  unchanged on the lateral view.    FANNY MELARA MD         SYSTEM ID:  [id number]        Labs:  Recent Labs   Lab 03/31/23  2230 03/31/23  1933 03/31/23  1722 03/31/23  1530 03/31/23  1239 03/31/23  0823 03/31/23  0434 03/30/23  1705 03/30/23  1148 03/30/23  0734 03/30/23  0413 03/29/23  1142 03/29/23  0526   WBC  --   --   --   --   --   --  20.6*  --   --   --  19.9*  --  17.1*   HGB  --   --   --   --   --   --  11.6*  --   --   --  11.0*  --  12.3*   MCV  --   --   --   --   --   --  96  --   --   --  95  --  96   PLT  --   --   --   --   --   --  412  --   --   --  315  --  298   INR  --   --   --   --   --   --  2.11*  --   --   --  2.19*  --  2.61*   NA  --  129*  --  130*  --   --  130*  130*   < > 127*  --  130*  130*   < > 129*  129*   POTASSIUM  --   --   --   --   --   --  3.7  --  3.8  --  3.4  --  3.5   CHLORIDE  --   --   --   --   --   --  87*  --   --   --  85*  --  84*   CO2  --   --   --   --   --   --  31*  --   --   --  35*  --  36*   BUN  --   --   --   --   --   --  15.1  --   --   --  17.3  --  16.6   CR  --   --   --   --   --   --  1.10  --   --   --  1.21*  --  0.96   ANIONGAP  --   --   --   --   --   --  12  --   --   --  10  --  9   TONY  --   --   --   --   --   --  8.5*  --   --   --  8.3*  --  8.4*   *  --  106*  --  106*   < > 98   < >  --    < > 123*   < > 97   ALBUMIN  --   --   --   --   --   --  2.9*  --   --   --  2.6*  --  2.6*   PROTTOTAL  --   --   --   --   --   --  5.6*  --   --   --  5.2*  --  5.4*   BILITOTAL  --   --   --   --   --   --  1.0  --   --   --  1.2  --  1.2   ALKPHOS  --   --   --   --   --   --  103  --   --   --  97  --  101   ALT  --   --   --   --   --   --  31  --   --   --  32  --  27   AST  --   --   --   --   --   --  50  --    --   --  64*  --  60*    < > = values in this interval not displayed.      GLUCOSE:   Recent Labs   Lab 03/31/23  2230 03/31/23  1722 03/31/23  1239 03/31/23  0823 03/31/23  0434 03/30/23  2310   * 106* 106* 123* 98 123*

## 2023-04-01 NOTE — PLAN OF CARE
"BP (!) 81/67 (BP Location: Right arm)   Pulse 83   Temp 98.3  F (36.8  C) (Oral)   Resp 17   Ht 1.702 m (5' 7\")   Wt 77.7 kg (171 lb 4.8 oz)   SpO2 100%   BMI 26.83 kg/m      Shift: 1900 - 0730  VS: Stable on RA, afebrile  Cardiac: Telemetry monitoring in progress, rhythm has been normal sinus throughout shift. HR: 80s, doppler MAPs  have been above 65. LVAD numbers have been within parameters, no alarms this shift.  Neuro: AOx4  BG: ACHS (104)  Labs: Awaiting AM lab collection d/t short staffing in the lab department.  Respiratory: Dyspnea on exertion, and infrequent cough noted. Lung sounds are clear in upper lobes bilaterally and diminished throughout.  Pain/Nausea/PRN: Pt denies nausea. PRN oxycodone x4 and PRN IV dilaudid x1 given for rib pain.  Diet: Regular diet, 2 L fluid restriction  LDA: L PIV (TKO)  GI/: Voiding into urinal without difficulty, LBM: 4/1  Skin: No new findings this shift.  Mobility: SBA  Plan: Continue to reinforce LVAD and medication education.    Will give report to oncoming nurse. Pt left in stable condition, care relinquished at this time.       "

## 2023-04-02 ENCOUNTER — APPOINTMENT (OUTPATIENT)
Dept: EDUCATION SERVICES | Facility: CLINIC | Age: 58
DRG: 001 | End: 2023-04-02
Attending: PHYSICIAN ASSISTANT
Payer: COMMERCIAL

## 2023-04-02 ENCOUNTER — APPOINTMENT (OUTPATIENT)
Dept: PHYSICAL THERAPY | Facility: CLINIC | Age: 58
DRG: 001 | End: 2023-04-02
Attending: PHYSICIAN ASSISTANT
Payer: COMMERCIAL

## 2023-04-02 LAB
ALBUMIN SERPL BCG-MCNC: 2.7 G/DL (ref 3.5–5.2)
ALP SERPL-CCNC: 96 U/L (ref 40–129)
ALT SERPL W P-5'-P-CCNC: 25 U/L (ref 10–50)
ANION GAP SERPL CALCULATED.3IONS-SCNC: 9 MMOL/L (ref 7–15)
AST SERPL W P-5'-P-CCNC: 40 U/L (ref 10–50)
BILIRUB SERPL-MCNC: 0.7 MG/DL
BUN SERPL-MCNC: 11.9 MG/DL (ref 6–20)
CALCIUM SERPL-MCNC: 8.3 MG/DL (ref 8.6–10)
CHLORIDE SERPL-SCNC: 95 MMOL/L (ref 98–107)
CREAT SERPL-MCNC: 1.35 MG/DL (ref 0.67–1.17)
DEPRECATED HCO3 PLAS-SCNC: 29 MMOL/L (ref 22–29)
ERYTHROCYTE [DISTWIDTH] IN BLOOD BY AUTOMATED COUNT: 15 % (ref 10–15)
GFR SERPL CREATININE-BSD FRML MDRD: 62 ML/MIN/1.73M2
GLUCOSE BLDC GLUCOMTR-MCNC: 100 MG/DL (ref 70–99)
GLUCOSE BLDC GLUCOMTR-MCNC: 102 MG/DL (ref 70–99)
GLUCOSE BLDC GLUCOMTR-MCNC: 79 MG/DL (ref 70–99)
GLUCOSE SERPL-MCNC: 84 MG/DL (ref 70–99)
HCT VFR BLD AUTO: 32.1 % (ref 40–53)
HGB BLD-MCNC: 9.8 G/DL (ref 13.3–17.7)
INR PPP: 2 (ref 0.85–1.15)
MAGNESIUM SERPL-MCNC: 2 MG/DL (ref 1.7–2.3)
MCH RBC QN AUTO: 29.3 PG (ref 26.5–33)
MCHC RBC AUTO-ENTMCNC: 30.5 G/DL (ref 31.5–36.5)
MCV RBC AUTO: 96 FL (ref 78–100)
MRSA DNA SPEC QL NAA+PROBE: NEGATIVE
PHOSPHATE SERPL-MCNC: 3.1 MG/DL (ref 2.5–4.5)
PLATELET # BLD AUTO: 488 10E3/UL (ref 150–450)
POTASSIUM SERPL-SCNC: 3.9 MMOL/L (ref 3.4–5.3)
PROT SERPL-MCNC: 5.1 G/DL (ref 6.4–8.3)
RBC # BLD AUTO: 3.35 10E6/UL (ref 4.4–5.9)
SA TARGET DNA: NEGATIVE
SODIUM SERPL-SCNC: 132 MMOL/L (ref 136–145)
SODIUM SERPL-SCNC: 132 MMOL/L (ref 136–145)
SODIUM SERPL-SCNC: 133 MMOL/L (ref 136–145)
SODIUM SERPL-SCNC: 133 MMOL/L (ref 136–145)
WBC # BLD AUTO: 17 10E3/UL (ref 4–11)

## 2023-04-02 PROCEDURE — 93750 INTERROGATION VAD IN PERSON: CPT | Performed by: INTERNAL MEDICINE

## 2023-04-02 PROCEDURE — 97116 GAIT TRAINING THERAPY: CPT | Mod: GP

## 2023-04-02 PROCEDURE — 99233 SBSQ HOSP IP/OBS HIGH 50: CPT | Mod: 25 | Performed by: INTERNAL MEDICINE

## 2023-04-02 PROCEDURE — 84100 ASSAY OF PHOSPHORUS: CPT | Performed by: INTERNAL MEDICINE

## 2023-04-02 PROCEDURE — 250N000011 HC RX IP 250 OP 636: Performed by: INTERNAL MEDICINE

## 2023-04-02 PROCEDURE — 250N000011 HC RX IP 250 OP 636: Performed by: NURSE PRACTITIONER

## 2023-04-02 PROCEDURE — 85027 COMPLETE CBC AUTOMATED: CPT | Performed by: NURSE PRACTITIONER

## 2023-04-02 PROCEDURE — 83735 ASSAY OF MAGNESIUM: CPT | Performed by: NURSE PRACTITIONER

## 2023-04-02 PROCEDURE — 80053 COMPREHEN METABOLIC PANEL: CPT | Performed by: PHYSICIAN ASSISTANT

## 2023-04-02 PROCEDURE — 250N000013 HC RX MED GY IP 250 OP 250 PS 637

## 2023-04-02 PROCEDURE — 250N000011 HC RX IP 250 OP 636: Performed by: PHYSICIAN ASSISTANT

## 2023-04-02 PROCEDURE — 85610 PROTHROMBIN TIME: CPT | Performed by: STUDENT IN AN ORGANIZED HEALTH CARE EDUCATION/TRAINING PROGRAM

## 2023-04-02 PROCEDURE — 250N000013 HC RX MED GY IP 250 OP 250 PS 637: Performed by: STUDENT IN AN ORGANIZED HEALTH CARE EDUCATION/TRAINING PROGRAM

## 2023-04-02 PROCEDURE — 120N000003 HC R&B IMCU UMMC

## 2023-04-02 PROCEDURE — 87641 MR-STAPH DNA AMP PROBE: CPT | Performed by: NURSE PRACTITIONER

## 2023-04-02 PROCEDURE — 36415 COLL VENOUS BLD VENIPUNCTURE: CPT | Performed by: NURSE PRACTITIONER

## 2023-04-02 PROCEDURE — 250N000011 HC RX IP 250 OP 636: Performed by: STUDENT IN AN ORGANIZED HEALTH CARE EDUCATION/TRAINING PROGRAM

## 2023-04-02 PROCEDURE — 250N000013 HC RX MED GY IP 250 OP 250 PS 637: Performed by: INTERNAL MEDICINE

## 2023-04-02 PROCEDURE — 84295 ASSAY OF SERUM SODIUM: CPT | Performed by: NURSE PRACTITIONER

## 2023-04-02 PROCEDURE — 97530 THERAPEUTIC ACTIVITIES: CPT | Mod: GP

## 2023-04-02 PROCEDURE — 36415 COLL VENOUS BLD VENIPUNCTURE: CPT | Performed by: PHYSICIAN ASSISTANT

## 2023-04-02 PROCEDURE — 84295 ASSAY OF SERUM SODIUM: CPT | Performed by: PHYSICIAN ASSISTANT

## 2023-04-02 RX ORDER — WARFARIN SODIUM 3 MG/1
3 TABLET ORAL
Status: COMPLETED | OUTPATIENT
Start: 2023-04-02 | End: 2023-04-02

## 2023-04-02 RX ORDER — BUMETANIDE 0.25 MG/ML
3 INJECTION INTRAMUSCULAR; INTRAVENOUS ONCE
Status: COMPLETED | OUTPATIENT
Start: 2023-04-02 | End: 2023-04-02

## 2023-04-02 RX ORDER — MAGNESIUM SULFATE HEPTAHYDRATE 40 MG/ML
2 INJECTION, SOLUTION INTRAVENOUS ONCE
Status: COMPLETED | OUTPATIENT
Start: 2023-04-02 | End: 2023-04-02

## 2023-04-02 RX ADMIN — HYDROMORPHONE HYDROCHLORIDE 0.2 MG: 0.2 INJECTION, SOLUTION INTRAMUSCULAR; INTRAVENOUS; SUBCUTANEOUS at 15:26

## 2023-04-02 RX ADMIN — WARFARIN SODIUM 3 MG: 3 TABLET ORAL at 18:21

## 2023-04-02 RX ADMIN — PIPERACILLIN SODIUM AND TAZOBACTAM SODIUM 4.5 G: 4; .5 INJECTION, POWDER, LYOPHILIZED, FOR SOLUTION INTRAVENOUS at 09:30

## 2023-04-02 RX ADMIN — PIPERACILLIN SODIUM AND TAZOBACTAM SODIUM 4.5 G: 4; .5 INJECTION, POWDER, LYOPHILIZED, FOR SOLUTION INTRAVENOUS at 21:06

## 2023-04-02 RX ADMIN — SPIRONOLACTONE 12.5 MG: 25 TABLET, FILM COATED ORAL at 08:08

## 2023-04-02 RX ADMIN — VANCOMYCIN HYDROCHLORIDE 1000 MG: 1 INJECTION, SOLUTION INTRAVENOUS at 12:40

## 2023-04-02 RX ADMIN — OXYCODONE HYDROCHLORIDE 10 MG: 10 TABLET ORAL at 04:04

## 2023-04-02 RX ADMIN — BUMETANIDE 3 MG: 0.25 INJECTION INTRAMUSCULAR; INTRAVENOUS at 14:33

## 2023-04-02 RX ADMIN — GABAPENTIN 300 MG: 300 CAPSULE ORAL at 14:33

## 2023-04-02 RX ADMIN — OXYCODONE HYDROCHLORIDE 10 MG: 10 TABLET ORAL at 00:06

## 2023-04-02 RX ADMIN — PANTOPRAZOLE SODIUM 40 MG: 40 TABLET, DELAYED RELEASE ORAL at 08:08

## 2023-04-02 RX ADMIN — AMIODARONE HYDROCHLORIDE 200 MG: 200 TABLET ORAL at 08:08

## 2023-04-02 RX ADMIN — GABAPENTIN 300 MG: 300 CAPSULE ORAL at 19:41

## 2023-04-02 RX ADMIN — VANCOMYCIN HYDROCHLORIDE 1000 MG: 1 INJECTION, SOLUTION INTRAVENOUS at 01:03

## 2023-04-02 RX ADMIN — LISINOPRIL 2.5 MG: 2.5 TABLET ORAL at 08:08

## 2023-04-02 RX ADMIN — HYDROMORPHONE HYDROCHLORIDE 0.2 MG: 0.2 INJECTION, SOLUTION INTRAMUSCULAR; INTRAVENOUS; SUBCUTANEOUS at 20:55

## 2023-04-02 RX ADMIN — DIGOXIN 250 MCG: 125 TABLET ORAL at 08:08

## 2023-04-02 RX ADMIN — LIDOCAINE PATCH 4% 1 PATCH: 40 PATCH TOPICAL at 08:09

## 2023-04-02 RX ADMIN — ACETAMINOPHEN 975 MG: 325 TABLET, FILM COATED ORAL at 08:08

## 2023-04-02 RX ADMIN — ACETAMINOPHEN 975 MG: 325 TABLET, FILM COATED ORAL at 15:25

## 2023-04-02 RX ADMIN — OXYCODONE HYDROCHLORIDE 10 MG: 10 TABLET ORAL at 18:45

## 2023-04-02 RX ADMIN — ASPIRIN 81 MG 81 MG: 81 TABLET ORAL at 08:08

## 2023-04-02 RX ADMIN — OXYCODONE HYDROCHLORIDE 10 MG: 10 TABLET ORAL at 22:43

## 2023-04-02 RX ADMIN — PIPERACILLIN SODIUM AND TAZOBACTAM SODIUM 4.5 G: 4; .5 INJECTION, POWDER, LYOPHILIZED, FOR SOLUTION INTRAVENOUS at 04:07

## 2023-04-02 RX ADMIN — OXYCODONE HYDROCHLORIDE 10 MG: 10 TABLET ORAL at 12:39

## 2023-04-02 RX ADMIN — OXYCODONE HYDROCHLORIDE 10 MG: 10 TABLET ORAL at 08:08

## 2023-04-02 RX ADMIN — MAGNESIUM SULFATE IN WATER 2 G: 40 INJECTION, SOLUTION INTRAVENOUS at 08:09

## 2023-04-02 RX ADMIN — GABAPENTIN 300 MG: 300 CAPSULE ORAL at 08:08

## 2023-04-02 RX ADMIN — ACETAMINOPHEN 975 MG: 325 TABLET, FILM COATED ORAL at 00:06

## 2023-04-02 RX ADMIN — PIPERACILLIN SODIUM AND TAZOBACTAM SODIUM 4.5 G: 4; .5 INJECTION, POWDER, LYOPHILIZED, FOR SOLUTION INTRAVENOUS at 15:26

## 2023-04-02 ASSESSMENT — ACTIVITIES OF DAILY LIVING (ADL)
ADLS_ACUITY_SCORE: 27
ADLS_ACUITY_SCORE: 22
ADLS_ACUITY_SCORE: 27

## 2023-04-02 NOTE — PROVIDER NOTIFICATION
Notified CVTS MD at 0949 Re:  -Pt had a 40 beat run of v-tach. Asymptomatic. Converted back to a SR with a BBB, rates 80s.  -No interventions ordered at this time.

## 2023-04-02 NOTE — PROGRESS NOTES
Cardiovascular Surgery Progress Note  04/02/2023         Assessment and Plan:     57 year old male with PMH of HFrEF (10%) secondary to NICM (suspected viral etiology) s/p ICD, chronic tobacco/marijuana use, COPD, HTN, CKD stage III, who presented to [facility] on 3/15 after being found down from a VT arrest. CPR was performed by family. Spontaneously converted to NSR without defibrillation. Down time suspected to be ~ 6 minutes. Presents to [facility] for LVAD placement with preop IABP by Dr. Matias on 3/23/23. He is now s/p LVAD (Heartmate III) placement, TVR, and PFO repair with Dr. Matias and Dr. Waller 3/23/22.    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 RV is mildly dilated and RV function is probably normal on milrinone with moderate to severe TR (there is reversal of hepatic venous flow). There is a swan in place which may be inhibiting coaptation. Trace AI. Mild MR. No MS. There is a continuous left to right shunt across a PFO.  Post-bypass JOAN: No AI. The aortic valve opens 1:3 cardiac cycles. No residual PFO. Atrial septum bulges into the left atrium. TR ring, mild to moderate TR. RV function mildly reduced on epi/norepi/vaso nitric. Inflow cannula is well positioned with low velocities. There is no visible aortic dissection.    HD stable. MAPS 60-70s. No flow events overnight. Had one episode of 40 beat run NSVT this AM.  - ASA 81 mg daily  - BB not indicated  - lisinopril 2.5 mg daily  - digoxin 250 mcg daily  - spironolactone 12.5 mg daily  - Diuresis as below (per cardiology)    Atrial fibrillation with RVR 3/28, now  NSR  - amiodarone infusion 0.5 mg/h started 3/28 per cards, ongoing afib RVR so amiodarone increased to 1 mg/h  - transitioned to PO amiodarone 400 mg BID 3/29, down to 200 mg daily 4/2      Pulmonary:  Extubated POD 1 to 4 lpm via NC. Now saturating well on room air.   - Supplemental O2 PRN to keep sats > 92%. Wean off as tolerated.  - Pulm hygiene, IS, activity and deep breathing    Neurology / MSK:  Acute post-operative pain   Pain well controlled with current regimen:  - Scheduled: Tylenol   - PRN: IV Hydromorphone, PO Oxycodone, methocarbamol      / Renal / Fluid / Electrolytes:  Hyponatremia, likely due to volume overload -- diuresis as below  Most recent creatinine 1.35; adequate UOP.   FLUID STATUS: Pre-op weight 172 lb, weight today 175 lb; I/O past 24 hours: -1.1L  - Diuresis per cardiology  - Replete lytes per protocol  - Strict I/O, daily weights  - Avoid/limit nephrotoxins as able  Urinary retention post-op, resolved  - remove muro 3/30    GI / Nutrition:   Elevated LFTs, improving  - Tolerating regular diet  - Continue bowel regimen, last BM 4/2    Endocrine:  Stress induced hyperglycemia   Hgb A1c 5.6  - Initially managed on insulin drip postop, transitioned to medium sliding scale; goal BG <180 for optimal healing    Infectious Disease:  Stress induced leukocytosis  Concern for pneumonia  WBC 17 and trending down, remains afebrile  - CRP elevated, procal elevated to 0.54 --> 0.49  - CXR with left consolidation concerning for pneumonia and patient complains of cough   - UA, blood cultures, and sputum sent 3/29 -- UA neg, blood cultures all NGTD  - start empiric IV vancomycin and Zosyn for likely HAP versus alternate post-operative infection. Continue for now as patient improving.   - 4/2 send MRSA nares, if negative, discontinue Vanco  - If WBC continues to up-trend, consider CT chest later this week per Dr. Matias  - Completed perioperative antibiotics  - Continue to monitor fever curve,  CBC    Hematology:   Acute blood loss anemia and thrombocytopenia  Hgb stable; Plt WNL, no signs or symptoms of active bleeding  - CBC daily    Anticoagulation:   - ASA 81 mg  - Coumadin for LVAD with a goal INR of 2-3, INR therapeutic    MSK/Skin:  Sternotomy  Surgical incision  - Sternal precautions  - Incisional cares per protocol    Prophylaxis:   - Stress ulcer prophylaxis: Pantoprazole 40 mg daily for 30 days  - DVT prophylaxis: Subcutaneous heparin, SCD    Disposition:   - Transferred to  on 3/26  - Therapies recommending discharge to ARU, may progress to home  - Has 3 LVAD education sessions left      VERONICA White, ACNPC-AG, CCRN  Nurse Practitioner  Cardiothoracic Surgery  Pager: [phone]            Interval History:     No acute events overnight. States pain is well managed on current regimen, slept fairly well. Breathing is stable on room air, working with IS. Tolerating diet, is passing flatus, +BM, no n/v. Denies chest pain, palpitations, dizziness, syncopal symptoms, chills, myalgias, sternal popping/clicking. Feels like edema has improved. Did have 40 beat run of NSVT this AM, asymptomatic.         Physical Exam:     Gen: A&Ox4, NAD  Neuro: Intact no focal deficits   CV: LVAD hum  Pulm: CTA, no wheezing or rhonchi, normal breathing on RA  Abd: nondistended, normal BS, soft, nontender  Ext: 1+ peripheral edema  Incision: clean, dry, intact, no erythema, sternum stable  Tubes/drain sites: dressing clean and dry  LVAD dressing clean, dry, intact.   Speed: 5200, Power: 3.6, Flow: 4, PI: 3.1         Data:    Imaging:  reviewed recent imaging, no acute concerns    No results found for this or any previous visit (from the past 24 hour(s)).     Labs:  Recent Labs   Lab 04/02/23  1236 04/02/23  1102 04/02/23  0806 04/02/23  0409 04/01/23  2302 04/01/23  1931 04/01/23  1056 04/01/23  0842 03/31/23  0823 03/31/23  0434   WBC  --   --   --  17.0*  --   --   --  18.7*  --  20.6*   HGB  --   --   --   9.8*  --   --   --  10.6*  --  11.6*   MCV  --   --   --  96  --   --   --  95  --  96   PLT  --   --   --  488*  --   --   --  469*  --  412   INR  --   --   --  2.00*  --   --   --  2.36*  --  2.11*   NA  --  132*  --  133*  133*  --  132*   < > 130*  130*   < > 130*  130*   POTASSIUM  --   --   --  3.9  --   --   --  4.1  --  3.7   CHLORIDE  --   --   --  95*  --   --   --  92*  --  87*   CO2  --   --   --  29  --   --   --  27  --  31*   BUN  --   --   --  11.9  --   --   --  12.9  --  15.1   CR  --   --   --  1.35*  --   --   --  1.04  --  1.10   ANIONGAP  --   --   --  9  --   --   --  11  --  12   TONY  --   --   --  8.3*  --   --   --  8.4*  --  8.5*   *  --  79 84   < >  --    < > 158*   < > 98   ALBUMIN  --   --   --  2.7*  --   --   --  2.7*  --  2.9*   PROTTOTAL  --   --   --  5.1*  --   --   --  5.3*  --  5.6*   BILITOTAL  --   --   --  0.7  --   --   --  0.9  --  1.0   ALKPHOS  --   --   --  96  --   --   --  96  --  103   ALT  --   --   --  25  --   --   --  26  --  31   AST  --   --   --  40  --   --   --  40  --  50    < > = values in this interval not displayed.      GLUCOSE:   Recent Labs   Lab 04/02/23  1236 04/02/23  0806 04/02/23  0409 04/01/23  2302 04/01/23  1608 04/01/23  1112   * 79 84 134* 97 112*

## 2023-04-02 NOTE — PLAN OF CARE
"BP (!) 78/39 (BP Location: Right arm)   Pulse 74   Temp 98.3  F (36.8  C) (Oral)   Resp 16   Ht 1.702 m (5' 7\")   Wt 79.8 kg (175 lb 14.8 oz)   SpO2 98%   BMI 27.55 kg/m      Shift: 1900 - 0730  VS: Stable on RA, afebrile  Cardiac: Telemetry monitoring in progress at this time, rhythm has been normal sinus throughout shift. HR: 70s - 90s, blood pressure MAPs obtained via doppler. LVAD numbers within parameters, no alarms this shift.   Neuro: AOx4  BG: ACHS (134)  Labs: RN managed electrolyte protocols ordered, mag replacement ordered and rechecks scheduled for tomorrow morning. Na: 133, WBCs: 17.0, Hgb: 9.8, INR: 2.0  Respiratory: Pt denies dyspnea, infrequent cough noted. Lung sounds are clear in upper lobes bilaterally and diminished throughout.  Pain/Nausea/PRN: Pt denies nausea. PRN oxycodone given x3 and PRN IV dilaudid given x1 for rib pain.  Diet: Regular diet, 2 L fluid restriction  LDA: L PIV (SL)  GI/: Voiding adequate amounts without difficulty, LBM: 4/2  Skin: No new findings this shift.  Mobility: SBA  Plan: Continue to reinforce LVAD and medication education. Pt has medication teaching with pharmacist scheduled for today.    Will give report to oncoming nurse. Pt left in stable condition, care relinquished at this time.       "

## 2023-04-02 NOTE — PLAN OF CARE
Neuro: A&Ox4. Able to make needs known.   Cardiac: SR with BBB. 40 beat run of V-tach this morning, asymptomatic. VSS. Doppler Maps. Heartmate 3 LVAD, numbers WNL. System check done. Drsg changed.   Respiratory: Sating >95% on RA, spot checking. Denies any SOB. Encouraged to use IS, at bedside.  GI/: Adequate urine output. Uses Urinal. BM x 2 this shift.   Diet/appetite: Tolerating Regular diet. 2L FR.   Activity:  Independent/SBA. Up to chair with encouragement x2. Walked with PT x1. Tolerated well. Sternal Precautions.   Pain: C/O L Rib pain. Pt reports pain level is an acceptable level on current regimen.   Skin: No new deficits noted.  LDA's: LVAD. PIV to LFA, Saline locked.     Weight trending up, CVTS team aware. Bumex ordered today.     Plan: Continue with POC. Notify primary team with changes.

## 2023-04-02 NOTE — PROGRESS NOTES
[facility]    Cardiology Consult Progress Note- Cardiology      Date of Admission:  3/15/2023     Assessment & Plan: HVSL   57 year old man with past medical history of HFrEF (EF 10% 2022) 2/2 NICM s/p ICD, tobacco use disorder, marijuana use, HTN, lumbar radiculopathy, CKD who presents after being found down at home. Found to have had a prolonged episode of VT with concern for cardiac arrest s/p ROSC in the field now s/p LVAD with HM3 on 23 and tricuspid annuloplasty.     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 Presented with out of hospital cardiac arrest with VT for 5 min 43 seconds of VT at 199 bpm, with spontaneous conversion to sinus rhythm without defibrillation on device interrogation on 3/15/2023.    - VT secondary prevention ICD in place  - Volume status: hypervolemic. IV Bumex 3mg today  .- BB: Hold PTA carvedilol 12.5 mg BID   - ACEi/ARB/ARNI: Lisinopril 2.5mg  - MRA: continue spironolactone 12.5 mg daily  - SGLT2i: discontinue Empaglaflozin   - SCD ppx/BiV pacer: ICD, reprogrammed device to provide ATP burst at VT threshold  - Heparin and coumadin when safe from surgical perspective  - Continue digoxin 250 mcg daily  -Amiodarone 200 mg daily starting tomorrow     LVAD Parameters:  Heartmate 3 LEFT VS  Flow (Lpm): 4.5 Lpm  Pulse Index (PI): 3.3 PI  Speed (rpm): 5200 rpm  Power (mari): 3.7 mari  Current Hct settin      Patient evaluated with Dr. Aguiar.       German Velez Reyes, MD, PhD  Internal Medicine PGY1  [facility]  [facility]  ______________________________________________________________________    Interval History   Care team notes reviewed. No acute events overnight.  Feeling well overall, no major complaints. He says that although leg edema had improved he seems like now there is more over the ankles.    Physical Exam   Vital Signs: Temp: 98.3  F (36.8  C) Temp src: Oral BP: (!) 78/39 Pulse: 74   Resp: 16 SpO2: 98 % O2 Device: None (Room air)    Weight: 175 lbs 14.83 oz    Constitutional: awake, alert, cooperative, no apparent distress, and appears stated age  Eyes: sclera clear, conjunctiva normal  Respiratory: No increased work of breathing, good air exchange, no wheezing  Cardiovascular: LVAD hum.  JVP 10 to 12 mmHg  GI: soft, non-distended, non-tender, no masses palpated  Skin: no bruising or bleeding  Neurologic: Awake, alert, oriented to name, place and time.  Cranial nerves II-XII are grossly intact.         Intake/Output Summary (Last 24 hours) at 4/2/2023 0661  Last data filed at 4/2/2023 0410  Gross per 24 hour   Intake 2350 ml   Output 3125 ml   Net -775 ml

## 2023-04-02 NOTE — CONSULTS
Warfarin and Heart failure education completed with Akshat,wife Landry and son Ezekiel. All questions answered.

## 2023-04-03 ENCOUNTER — APPOINTMENT (OUTPATIENT)
Dept: PHYSICAL THERAPY | Facility: CLINIC | Age: 58
DRG: 001 | End: 2023-04-03
Attending: PHYSICIAN ASSISTANT
Payer: COMMERCIAL

## 2023-04-03 ENCOUNTER — APPOINTMENT (OUTPATIENT)
Dept: CT IMAGING | Facility: CLINIC | Age: 58
DRG: 001 | End: 2023-04-03
Attending: NURSE PRACTITIONER
Payer: COMMERCIAL

## 2023-04-03 LAB
ALBUMIN SERPL BCG-MCNC: 2.6 G/DL (ref 3.5–5.2)
ALP SERPL-CCNC: 92 U/L (ref 40–129)
ALT SERPL W P-5'-P-CCNC: 26 U/L (ref 10–50)
ANION GAP SERPL CALCULATED.3IONS-SCNC: 9 MMOL/L (ref 7–15)
AST SERPL W P-5'-P-CCNC: 36 U/L (ref 10–50)
BACTERIA BLD CULT: NO GROWTH
BACTERIA BLD CULT: NO GROWTH
BILIRUB SERPL-MCNC: 0.7 MG/DL
BUN SERPL-MCNC: 10.4 MG/DL (ref 6–20)
CALCIUM SERPL-MCNC: 8.3 MG/DL (ref 8.6–10)
CHLORIDE SERPL-SCNC: 93 MMOL/L (ref 98–107)
CREAT SERPL-MCNC: 1.16 MG/DL (ref 0.67–1.17)
DEPRECATED HCO3 PLAS-SCNC: 29 MMOL/L (ref 22–29)
ERYTHROCYTE [DISTWIDTH] IN BLOOD BY AUTOMATED COUNT: 14.8 % (ref 10–15)
GFR SERPL CREATININE-BSD FRML MDRD: 74 ML/MIN/1.73M2
GLUCOSE BLDC GLUCOMTR-MCNC: 102 MG/DL (ref 70–99)
GLUCOSE BLDC GLUCOMTR-MCNC: 82 MG/DL (ref 70–99)
GLUCOSE SERPL-MCNC: 94 MG/DL (ref 70–99)
HCT VFR BLD AUTO: 31.3 % (ref 40–53)
HGB BLD-MCNC: 9.7 G/DL (ref 13.3–17.7)
INR PPP: 1.68 (ref 0.85–1.15)
MAGNESIUM SERPL-MCNC: 1.9 MG/DL (ref 1.7–2.3)
MCH RBC QN AUTO: 29.7 PG (ref 26.5–33)
MCHC RBC AUTO-ENTMCNC: 31 G/DL (ref 31.5–36.5)
MCV RBC AUTO: 96 FL (ref 78–100)
PHOSPHATE SERPL-MCNC: 3.3 MG/DL (ref 2.5–4.5)
PLATELET # BLD AUTO: 546 10E3/UL (ref 150–450)
POTASSIUM SERPL-SCNC: 4.3 MMOL/L (ref 3.4–5.3)
PROT SERPL-MCNC: 5 G/DL (ref 6.4–8.3)
RBC # BLD AUTO: 3.27 10E6/UL (ref 4.4–5.9)
SODIUM SERPL-SCNC: 131 MMOL/L (ref 136–145)
SODIUM SERPL-SCNC: 131 MMOL/L (ref 136–145)
VANCOMYCIN SERPL-MCNC: 24.1 UG/ML
WBC # BLD AUTO: 18.8 10E3/UL (ref 4–11)

## 2023-04-03 PROCEDURE — 99233 SBSQ HOSP IP/OBS HIGH 50: CPT | Mod: 25 | Performed by: INTERNAL MEDICINE

## 2023-04-03 PROCEDURE — 84100 ASSAY OF PHOSPHORUS: CPT | Performed by: INTERNAL MEDICINE

## 2023-04-03 PROCEDURE — 85610 PROTHROMBIN TIME: CPT | Performed by: STUDENT IN AN ORGANIZED HEALTH CARE EDUCATION/TRAINING PROGRAM

## 2023-04-03 PROCEDURE — 80202 ASSAY OF VANCOMYCIN: CPT | Performed by: INTERNAL MEDICINE

## 2023-04-03 PROCEDURE — 250N000011 HC RX IP 250 OP 636: Performed by: PHYSICIAN ASSISTANT

## 2023-04-03 PROCEDURE — 36415 COLL VENOUS BLD VENIPUNCTURE: CPT | Performed by: STUDENT IN AN ORGANIZED HEALTH CARE EDUCATION/TRAINING PROGRAM

## 2023-04-03 PROCEDURE — 250N000011 HC RX IP 250 OP 636: Performed by: INTERNAL MEDICINE

## 2023-04-03 PROCEDURE — 93750 INTERROGATION VAD IN PERSON: CPT | Performed by: INTERNAL MEDICINE

## 2023-04-03 PROCEDURE — 250N000011 HC RX IP 250 OP 636: Performed by: STUDENT IN AN ORGANIZED HEALTH CARE EDUCATION/TRAINING PROGRAM

## 2023-04-03 PROCEDURE — 36415 COLL VENOUS BLD VENIPUNCTURE: CPT | Performed by: INTERNAL MEDICINE

## 2023-04-03 PROCEDURE — 97116 GAIT TRAINING THERAPY: CPT | Mod: GP

## 2023-04-03 PROCEDURE — 250N000013 HC RX MED GY IP 250 OP 250 PS 637

## 2023-04-03 PROCEDURE — 250N000013 HC RX MED GY IP 250 OP 250 PS 637: Performed by: STUDENT IN AN ORGANIZED HEALTH CARE EDUCATION/TRAINING PROGRAM

## 2023-04-03 PROCEDURE — 120N000003 HC R&B IMCU UMMC

## 2023-04-03 PROCEDURE — 71250 CT THORAX DX C-: CPT

## 2023-04-03 PROCEDURE — 80053 COMPREHEN METABOLIC PANEL: CPT | Performed by: PHYSICIAN ASSISTANT

## 2023-04-03 PROCEDURE — 250N000013 HC RX MED GY IP 250 OP 250 PS 637: Performed by: INTERNAL MEDICINE

## 2023-04-03 PROCEDURE — 97530 THERAPEUTIC ACTIVITIES: CPT | Mod: GP

## 2023-04-03 PROCEDURE — 83735 ASSAY OF MAGNESIUM: CPT | Performed by: NURSE PRACTITIONER

## 2023-04-03 PROCEDURE — 84295 ASSAY OF SERUM SODIUM: CPT | Performed by: NURSE PRACTITIONER

## 2023-04-03 PROCEDURE — 71250 CT THORAX DX C-: CPT | Mod: 26 | Performed by: RADIOLOGY

## 2023-04-03 PROCEDURE — 85027 COMPLETE CBC AUTOMATED: CPT | Performed by: NURSE PRACTITIONER

## 2023-04-03 RX ORDER — BUMETANIDE 0.25 MG/ML
3 INJECTION INTRAMUSCULAR; INTRAVENOUS ONCE
Status: COMPLETED | OUTPATIENT
Start: 2023-04-03 | End: 2023-04-03

## 2023-04-03 RX ORDER — METHOCARBAMOL 750 MG/1
750 TABLET, FILM COATED ORAL 4 TIMES DAILY
Status: DISCONTINUED | OUTPATIENT
Start: 2023-04-03 | End: 2023-04-03

## 2023-04-03 RX ORDER — WARFARIN SODIUM 5 MG/1
5 TABLET ORAL
Status: COMPLETED | OUTPATIENT
Start: 2023-04-03 | End: 2023-04-03

## 2023-04-03 RX ADMIN — HYDROMORPHONE HYDROCHLORIDE 0.2 MG: 0.2 INJECTION, SOLUTION INTRAMUSCULAR; INTRAVENOUS; SUBCUTANEOUS at 01:20

## 2023-04-03 RX ADMIN — PIPERACILLIN SODIUM AND TAZOBACTAM SODIUM 4.5 G: 4; .5 INJECTION, POWDER, LYOPHILIZED, FOR SOLUTION INTRAVENOUS at 04:57

## 2023-04-03 RX ADMIN — ACETAMINOPHEN 975 MG: 325 TABLET, FILM COATED ORAL at 00:33

## 2023-04-03 RX ADMIN — OXYCODONE HYDROCHLORIDE 10 MG: 10 TABLET ORAL at 16:21

## 2023-04-03 RX ADMIN — OXYCODONE HYDROCHLORIDE 10 MG: 10 TABLET ORAL at 07:27

## 2023-04-03 RX ADMIN — SPIRONOLACTONE 12.5 MG: 25 TABLET, FILM COATED ORAL at 08:48

## 2023-04-03 RX ADMIN — ASPIRIN 81 MG 81 MG: 81 TABLET ORAL at 08:45

## 2023-04-03 RX ADMIN — OXYCODONE HYDROCHLORIDE 10 MG: 10 TABLET ORAL at 20:22

## 2023-04-03 RX ADMIN — ACETAMINOPHEN 975 MG: 325 TABLET, FILM COATED ORAL at 08:45

## 2023-04-03 RX ADMIN — PIPERACILLIN SODIUM AND TAZOBACTAM SODIUM 4.5 G: 4; .5 INJECTION, POWDER, LYOPHILIZED, FOR SOLUTION INTRAVENOUS at 23:52

## 2023-04-03 RX ADMIN — ACETAMINOPHEN 975 MG: 325 TABLET, FILM COATED ORAL at 23:52

## 2023-04-03 RX ADMIN — ACETAMINOPHEN 975 MG: 325 TABLET, FILM COATED ORAL at 16:08

## 2023-04-03 RX ADMIN — GABAPENTIN 300 MG: 300 CAPSULE ORAL at 13:09

## 2023-04-03 RX ADMIN — VANCOMYCIN HYDROCHLORIDE 1000 MG: 1 INJECTION, SOLUTION INTRAVENOUS at 00:34

## 2023-04-03 RX ADMIN — PIPERACILLIN SODIUM AND TAZOBACTAM SODIUM 4.5 G: 4; .5 INJECTION, POWDER, LYOPHILIZED, FOR SOLUTION INTRAVENOUS at 10:43

## 2023-04-03 RX ADMIN — BUMETANIDE 3 MG: 0.25 INJECTION INTRAMUSCULAR; INTRAVENOUS at 16:09

## 2023-04-03 RX ADMIN — AMIODARONE HYDROCHLORIDE 200 MG: 200 TABLET ORAL at 08:48

## 2023-04-03 RX ADMIN — OXYCODONE HYDROCHLORIDE 10 MG: 10 TABLET ORAL at 11:29

## 2023-04-03 RX ADMIN — HYDROMORPHONE HYDROCHLORIDE 0.2 MG: 0.2 INJECTION, SOLUTION INTRAMUSCULAR; INTRAVENOUS; SUBCUTANEOUS at 22:01

## 2023-04-03 RX ADMIN — PANTOPRAZOLE SODIUM 40 MG: 40 TABLET, DELAYED RELEASE ORAL at 08:46

## 2023-04-03 RX ADMIN — GABAPENTIN 300 MG: 300 CAPSULE ORAL at 08:47

## 2023-04-03 RX ADMIN — PIPERACILLIN SODIUM AND TAZOBACTAM SODIUM 4.5 G: 4; .5 INJECTION, POWDER, LYOPHILIZED, FOR SOLUTION INTRAVENOUS at 18:12

## 2023-04-03 RX ADMIN — LISINOPRIL 2.5 MG: 2.5 TABLET ORAL at 08:46

## 2023-04-03 RX ADMIN — DIGOXIN 250 MCG: 125 TABLET ORAL at 08:47

## 2023-04-03 RX ADMIN — HYDROMORPHONE HYDROCHLORIDE 0.2 MG: 0.2 INJECTION, SOLUTION INTRAMUSCULAR; INTRAVENOUS; SUBCUTANEOUS at 21:59

## 2023-04-03 RX ADMIN — SENNOSIDES AND DOCUSATE SODIUM 2 TABLET: 8.6; 5 TABLET ORAL at 21:06

## 2023-04-03 RX ADMIN — WARFARIN SODIUM 5 MG: 5 TABLET ORAL at 18:13

## 2023-04-03 RX ADMIN — EMPAGLIFLOZIN 10 MG: 10 TABLET, FILM COATED ORAL at 08:48

## 2023-04-03 RX ADMIN — GABAPENTIN 300 MG: 300 CAPSULE ORAL at 20:22

## 2023-04-03 ASSESSMENT — ACTIVITIES OF DAILY LIVING (ADL)
ADLS_ACUITY_SCORE: 20
ADLS_ACUITY_SCORE: 22
ADLS_ACUITY_SCORE: 20
ADLS_ACUITY_SCORE: 20
ADLS_ACUITY_SCORE: 22
ADLS_ACUITY_SCORE: 20
ADLS_ACUITY_SCORE: 20

## 2023-04-03 NOTE — CONSULTS
"Cardiology Progress Note       Changes Today:   -Reinitiate empaglaflozin   -Continue diuresing    Physical Exam   Temp: 97.8  F (36.6  C) Temp src: Oral BP: 97/76 Pulse: 80   Resp: 17 SpO2: 93 % O2 Device: None (Room air)      Vital Signs with Ranges  Temp:  [97.6  F (36.4  C)-98.7  F (37.1  C)] 97.8  F (36.6  C)  Pulse:  [64-83] 80  Resp:  [16-18] 17  BP: (81-97)/(65-76) 97/76  SpO2:  [93 %-100 %] 93 %    LVAD Settings  Heartmate 3 LEFT VS  Flow (Lpm): 4.6 Lpm  Pulse Index (PI): 3.1 PI  Speed (rpm): 5250 rpm  Power (mari): 3.7 mari  Current Hct settin    Intake/Output    Intake/Output Summary (Last 24 hours) at 4/3/2023 0753  Last data filed at 4/3/2023 0732  Gross per 24 hour   Intake 950 ml   Output 3700 ml   Net -2750 ml       Weight  Vitals:    23 0543 23 0338 23 1400 23 0707   Weight: 75.6 kg (166 lb 10.7 oz) 79.2 kg (174 lb 9.7 oz) 77.7 kg (171 lb 4.8 oz) 78.5 kg (173 lb 1 oz)    23 0426   Weight: 79.8 kg (175 lb 14.8 oz)       Resp: 17        BETA BLOCKER NOT PRESCRIBED           acetaminophen  975 mg Oral Q8H HOWIE     amiodarone  200 mg Oral Daily     aspirin  81 mg Oral or NG Tube Daily     digoxin  250 mcg Oral Daily     gabapentin  300 mg Oral or Feeding Tube TID     lidocaine  1 patch Transdermal Q24H     lidocaine   Transdermal Q8H HOWIE     lisinopril  2.5 mg Oral Daily     methocarbamol  750 mg Oral 4x Daily     pantoprazole  40 mg Oral QAM AC     piperacillin-tazobactam  4.5 g Intravenous Q6H     polyethylene glycol  17 g Oral BID     senna-docusate  2 tablet Oral BID     sodium chloride (PF)  3 mL Intracatheter Q8H     spironolactone  12.5 mg Oral Daily     vancomycin  1,000 mg Intravenous Q12H     Warfarin Therapy Reminder  1 each Oral See Admin Instructions        , Blood pressure 97/76, pulse 80, temperature 97.8  F (36.6  C), temperature source Oral, resp. rate 17, height 1.702 m (5' 7\"), weight 79.8 kg (175 lb 14.8 oz), SpO2 93 %.  Constitutional: awake, " alert, cooperative, no apparent distress, and appears stated age  Eyes: sclera clear, conjunctiva normal  Respiratory: No increased work of breathing, good air exchange, no wheezing  Cardiovascular: LVAD hum.  JVP 10 to 12 mmHg  GI: soft, non-distended, non-tender, no masses palpated  Skin: no bruising or bleeding  Neurologic: Awake, alert, oriented to name, place and time.  Cranial nerves II-XII are grossly intact.        Data   Recent Labs   Lab Test 04/03/23  0730 04/03/23 0442 04/02/23 1952 04/02/23  0806 04/02/23  0409   NA  --  131*  131* 132*   < > 133*  133*   POTASSIUM  --  4.3  --   --  3.9   CHLORIDE  --  93*  --   --  95*   CO2  --  29  --   --  29   ANIONGAP  --  9  --   --  9   GLC 82 94  --    < > 84   BUN  --  10.4  --   --  11.9   CR  --  1.16  --   --  1.35*   TONY  --  8.3*  --   --  8.3*    < > = values in this interval not displayed.       Lab Results   Component Value Date    WBC 18.8 04/03/2023    WBC 9.8 11/20/2019     Lab Results   Component Value Date    RBC 3.27 04/03/2023    RBC 4.70 11/20/2019     Lab Results   Component Value Date    HGB 9.7 04/03/2023    HGB 14.3 11/20/2019     Lab Results   Component Value Date    HCT 31.3 04/03/2023    HCT 45.3 11/20/2019     No components found for: MCT  Lab Results   Component Value Date    MCV 96 04/03/2023    MCV 96 11/20/2019     Lab Results   Component Value Date    MCH 29.7 04/03/2023    MCH 30.4 11/20/2019     Lab Results   Component Value Date    MCHC 31.0 04/03/2023    MCHC 31.6 11/20/2019     Lab Results   Component Value Date    RDW 14.8 04/03/2023    RDW 13.0 11/20/2019     Lab Results   Component Value Date     04/03/2023     11/20/2019        Liver Function Studies -   Recent Labs   Lab Test 04/03/23 0442   PROTTOTAL 5.0*   ALBUMIN 2.6*   BILITOTAL 0.7   ALKPHOS 92   AST 36   ALT 26       Venous Blood Gas  No lab results found in last 7 days.    Arterial Blood Gas  No lab results found in last 7 days.       No results  found for this or any previous visit (from the past 24 hour(s)).    Blood culture:  Results for orders placed or performed during the hospital encounter of 03/15/23   Blood Culture Hand, Left    Specimen: Hand, Left; Blood   Result Value Ref Range    Culture No growth after 4 days    Blood Culture Hand, Right    Specimen: Hand, Right; Blood   Result Value Ref Range    Culture No growth after 4 days    Blood Culture Hand, Right    Specimen: Hand, Right; Blood   Result Value Ref Range    Culture No Growth    Blood Culture Line, venous    Specimen: Line, venous; Blood   Result Value Ref Range    Culture No Growth       Urine culture:  No results found for this or any previous visit.    Assessment & Plan    57 year old man with past medical history of HFrEF (EF 10% 5/2022) 2/2 NICM s/p ICD, tobacco use disorder, marijuana use, HTN, lumbar radiculopathy, CKD who presents after being found down at home. Found to have had a prolonged episode of VT with concern for cardiac arrest s/p ROSC in the field now s/p LVAD with HM3 on 03/23/23 and tricuspid annuloplasty.     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 Presented with out of hospital cardiac arrest with VT for 5 min 43 seconds of VT at 199 bpm, with spontaneous conversion to sinus rhythm without defibrillation on device interrogation on 3/15/2023.    - VT secondary prevention ICD in place  - Volume status: hypervolemic. IV Bumex 3mg today  .- BB: Hold PTA carvedilol 12.5 mg BID   - ACEi/ARB/ARNI: Lisinopril 2.5mg  - MRA: continue spironolactone 12.5 mg daily  - SGLT2i: Unclear  why discontinued Empaglaflozin over the weekend, patient tolerating well and covered by insurance as documented by pharmacy liason. Will reinitiate as patient will benefit given HFrEF.   - SCD ppx/BiV pacer: ICD, reprogrammed device to provide ATP burst at VT threshold  - Heparin and coumadin when safe from surgical perspective  - Continue digoxin 250 mcg daily  -Amiodarone 200 mg daily      Patient evaluated with Dr. Marshall.      Marco Santoyo MD  Cardiology Fellow  [phone]

## 2023-04-03 NOTE — PHARMACY-VANCOMYCIN DOSING SERVICE
Pharmacy Vancomycin Note  Date of Service April 3, 2023  Patient's  1965   57 year old, male    Indication: Postoperative Infection  Day of Therapy: 6  Current vancomycin regimen: 1000 mg IV q12h  Current vancomycin monitoring method: AUC  Current vancomycin therapeutic monitoring goal: 400-600 mg*h/L    InsightRX Prediction of Current Vancomycin Regimen    Regimen: 1000 mg IV every 12 hours.  Start time: 12:34 on 2023  Exposure target: AUC24 (range)400-600 mg/L.hr   AUC24,ss: 614 mg/L.hr  Probability of AUC24 > 400: 100 %  Ctrough,ss: 21.5 mg/L  Probability of Ctrough,ss > 20: 64 %  Probability of nephrotoxicity (Lodise JERONIMO ): 20 %    Current estimated CrCl = Estimated Creatinine Clearance: 72.9 mL/min (based on SCr of 1.16 mg/dL).    Creatinine for last 3 days  2023:  8:42 AM Creatinine 1.04 mg/dL  2023:  4:09 AM Creatinine 1.35 mg/dL  4/3/2023:  4:42 AM Creatinine 1.16 mg/dL    Recent Vancomycin Levels (past 3 days)  3/31/2023:  5:51 PM Vancomycin 13.1 ug/mL  2023:  8:42 AM Vancomycin 14.2 ug/mL  4/3/2023:  6:36 AM Vancomycin 24.1 ug/mL    Vancomycin IV Administrations (past 72 hours)                   vancomycin (VANCOCIN) 1000 mg in dextrose 5% 200 mL PREMIX (mg) 1,000 mg New Bag 23 0034     1,000 mg New Bag 23 1240     1,000 mg New Bag  0103     1,000 mg New Bag 23 1249    vancomycin (VANCOCIN) 1000 mg in dextrose 5% 200 mL PREMIX (mg) 1,000 mg New Bag 23 1724                Nephrotoxins and other renal medications (From now, onward)    Start     Dose/Rate Route Frequency Ordered Stop    23 1230  vancomycin (VANCOCIN) 750 mg in sodium chloride 0.9 % 250 mL intermittent infusion         750 mg  over 90 Minutes Intravenous EVERY 12 HOURS 23 0824      23 0800  bumetanide (BUMEX) injection 3 mg         3 mg Intravenous ONCE 23 0759      23 0800  empagliflozin (JARDIANCE) tablet 10 mg         10 mg Oral DAILY 23 0754       "03/29/23 1600  piperacillin-tazobactam (ZOSYN) 4.5 g vial to attach to  mL bag        Note to Pharmacy: For [facility], [facility] and [facility]: For Zosyn-naive patients, use the \"Zosyn initial dose + extended infusion\" order panel.    4.5 g  over 30 Minutes Intravenous EVERY 6 HOURS 03/29/23 1508      03/28/23 1230  lisinopril (ZESTRIL) tablet 2.5 mg         2.5 mg Oral DAILY 03/28/23 1226               Contrast Orders - past 72 hours (72h ago, onward)    None          Interpretation of levels and current regimen:  Vancomycin level is reflective of AUC greater than 600    Has serum creatinine changed greater than 50% in last 72 hours: No    Urine output:  good urine output    Renal Function: Stable    InsightRX Prediction of Planned New Vancomycin Regimen    Regimen: 750 mg IV every 12 hours.  Start time: 12:34 on 04/03/2023  Exposure target: AUC24 (range)400-600 mg/L.hr   AUC24,ss: 468 mg/L.hr  Probability of AUC24 > 400: 85 %  Ctrough,ss: 16.4 mg/L  Probability of Ctrough,ss > 20: 16 %  Probability of nephrotoxicity (Lodise JERONIMO 2009): 12 %    Plan:  1. Decrease Dose to 750 mg every 12 hours  2. Vancomycin monitoring method: AUC  3. Vancomycin therapeutic monitoring goal: 400-600 mg*h/L  4. Pharmacy will check vancomycin levels as appropriate in 1-3 Days.  5. Serum creatinine levels will be ordered daily for the first week of therapy and at least twice weekly for subsequent weeks.    Eryn Smith, PharmD  "

## 2023-04-03 NOTE — PLAN OF CARE
"Goal Outcome Evaluation:      BP (!) 88/65 (BP Location: Right arm)   Pulse 76   Temp 98  F (36.7  C) (Oral)   Resp 17   Ht 1.702 m (5' 7\")   Wt 79.8 kg (175 lb 14.8 oz)   SpO2 93%   BMI 27.55 kg/m      Neuro: A&Ox4. Calls as needed  Cardiac: SR  BBB. Pt has an HM3 Lvad.  Maps are Dopplered q 4 hours.   Respiratory: Sating >94% on RA.  GI/: Adequate urine output. BM X1  Diet/appetite: Tolerating regular  Diet with a 2 L FR. Eating well.  Activity:  Assist of 1/SBA, up to chair and in halls.  Pain: At acceptable level on current regimen.   Skin: No new deficits noted.  LDA's:One PIV    Plan: Continue with POC. Notify primary team with changes.    "

## 2023-04-03 NOTE — PLAN OF CARE
Neuro: A&Ox4.   Cardiac: SR with HR's 70-80's. BBB. HM3, no alarms. Doppler maps (72). Afebrile.   Respiratory: Sating >92% on RA. Spot checking per pt request. LS clear, equal bilat.   GI/: Adequate UOP via urinal/BR. Diuresing. BM X1.  Diet/appetite: Tolerating regular diet + 2L FR. Eating well. Denied N/V.  Activity: SBA, up to chair and in room. Encouraged OOB activity. On sternal precautions.  Pain: C/o L side rib/flank pain. PRN IV Dilaudid and PO Oxy given x1 with some relief.  Skin: No new deficits noted. Sternum incision /surgical site approximated, AMY. R groin site, ecchymotic, unchanged.  LDA's: L PIV - SL.    Plan: Will continue with POC and notify primary team with any changes.

## 2023-04-03 NOTE — PROGRESS NOTES
Brief Care Coordination Note    Patient has progressed w/physical therapy to discharge to home w/OP CR/PT. Patient will need new warfarin at discharge, PLC order placed for education at this time. Will need INR labs arranged. Care coordination will continue to follow for discharge planning.     Lakshmi Manriquez, RNCC, BSN    [facility]    [facility] [address] [place] [address]    [email or web address]    Office: [phone] Pager: [phone]

## 2023-04-03 NOTE — PROGRESS NOTES
Cardiovascular Surgery Progress Note  04/03/2023         Assessment and Plan:     57 year old male with PMH of HFrEF (10%) secondary to NICM (suspected viral etiology) s/p ICD, chronic tobacco/marijuana use, COPD, HTN, CKD stage III, who presented to [facility] on 3/15 after being found down from a VT arrest. CPR was performed by family. Spontaneously converted to NSR without defibrillation. Down time suspected to be ~ 6 minutes. Presents to [facility] for LVAD placement with preop IABP by Dr. Matias on 3/23/23. He is now s/p LVAD (Heartmate III) placement, TVR, and PFO repair with Dr. Matias and Dr. Waller 3/23/22.    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 RV is mildly dilated and RV function is probably normal on milrinone with moderate to severe TR (there is reversal of hepatic venous flow). There is a swan in place which may be inhibiting coaptation. Trace AI. Mild MR. No MS. There is a continuous left to right shunt across a PFO.  Post-bypass JOAN: No AI. The aortic valve opens 1:3 cardiac cycles. No residual PFO. Atrial septum bulges into the left atrium. TR ring, mild to moderate TR. RV function mildly reduced on epi/norepi/vaso nitric. Inflow cannula is well positioned with low velocities. There is no visible aortic dissection.    HD stable. MAPS 70s. No flow events overnight. No further episodes of NSVT.  - ASA 81 mg daily  - BB not indicated  - lisinopril 2.5 mg daily  - digoxin 250 mcg daily  - spironolactone 12.5 mg daily  - Diuresis as below (per cardiology)    Atrial fibrillation with RVR 3/28, now NSR  - amiodarone  infusion 0.5 mg/h started 3/28 per cards, ongoing afib RVR so amiodarone increased to 1 mg/h  - transitioned to PO amiodarone 400 mg BID 3/29, down to 200 mg daily 4/2      Pulmonary:  Extubated POD 1 to 4 lpm via NC. Now saturating well on room air.   - Pulm hygiene, IS, activity and deep breathing    Neurology / MSK:  Acute post-operative pain   Pain well controlled with current regimen:  - Scheduled: Tylenol   - PRN: IV Hydromorphone, PO Oxycodone. Refusing methocarbamol as it gives him weakness     / Renal / Fluid / Electrolytes:  Hyponatremia, likely due to volume overload -- diuresis as below  Most recent creatinine 1.16; adequate UOP.   FLUID STATUS: Pre-op weight 172 lb, weight today 175 lb; I/O past 24 hours: -2L  - Diuresis per cardiology- IV Bumex  - Replete lytes per protocol  - Strict I/O, daily weights  - Avoid/limit nephrotoxins as able  Urinary retention post-op, resolved  - remove muro 3/30    GI / Nutrition:   Elevated LFTs, improving  - Tolerating regular diet  - Continue bowel regimen, last BM 4/2    Endocrine:  Stress induced hyperglycemia   Hgb A1c 5.6  - Initially managed on insulin drip postop, transitioned to medium sliding scale; goal BG <180 for optimal healing    Infectious Disease:  Stress induced leukocytosis  Concern for pneumonia  WBC 18.8, remains afebrile  - CRP elevated, procal elevated to 0.54 --> 0.49  - CXR with left consolidation concerning for pneumonia and patient complains of cough   - UA, blood cultures, and sputum sent 3/29 -- UA neg, blood cultures all NGTD  - start empiric IV vancomycin and Zosyn for likely HAP versus alternate post-operative infection. Discontinue vanco given negative MRSA nare  - 4/2 send MRSA nares, negative  - CT chest for persistent leukocytosis, 4/3  - ID consult for persistent leukocytosis, 4/3  - Completed perioperative antibiotics  - Continue to monitor fever curve, CBC    Hematology:   Acute blood loss anemia and thrombocytopenia  Hgb  stable; Plt WNL, no signs or symptoms of active bleeding  - CBC daily    Anticoagulation:   - ASA 81 mg  - Coumadin for LVAD with a goal INR of 2-3    MSK/Skin:  Sternotomy  Surgical incision  - Sternal precautions  - Incisional cares per protocol    Prophylaxis:   - Stress ulcer prophylaxis: Pantoprazole 40 mg daily  - DVT prophylaxis: warfarin, SCD    Disposition:   - Transferred to  on 3/26  - Therapies recommending discharge to home  - Has 3 LVAD education sessions left      VERONICA White, ACNPC-AG, CCRN  Nurse Practitioner  Cardiothoracic Surgery  Pager: [phone]            Interval History:     No acute events overnight. States pain is well managed on current regimen, slept well. Breathing is stable on room air, working with IS. Tolerating diet, is passing flatus, +BM, no n/v. Denies chest pain, palpitations, dizziness, syncopal symptoms, chills, myalgias, sternal popping/clicking. Still having rib soreness.         Physical Exam:     Gen: A&Ox4, NAD  Neuro: Intact no focal deficits   CV: LVAD hum  Pulm: CTA, no wheezing or rhonchi, normal breathing on RA  Abd: nondistended, normal BS, soft, nontender  Ext: 1+ peripheral edema  Incision: clean, dry, intact, no erythema, sternum stable  Tubes/drain sites: dressing clean and dry  LVAD dressing clean, dry, intact.   Speed: 5252, Power: 3.7, Flow: 4.6, PI: 3.1         Data:    Imaging:  reviewed recent imaging, no acute concerns    No results found for this or any previous visit (from the past 24 hour(s)).     Labs:  Recent Labs   Lab 04/03/23  0730 04/03/23  0442 04/02/23  1952 04/02/23  1930 04/02/23  1236 04/02/23  1102 04/02/23  0806 04/02/23  0409 04/01/23  1056 04/01/23  0842   WBC  --  18.8*  --   --   --   --   --  17.0*  --  18.7*   HGB  --  9.7*  --   --   --   --   --  9.8*  --  10.6*   MCV  --  96  --   --   --   --   --  96  --  95   PLT  --  546*  --   --   --   --   --  488*  --  469*   INR  --  1.68*  --   --   --   --   --  2.00*  --   2.36*   NA  --  131*  131* 132*  --   --  132*  --  133*  133*   < > 130*  130*   POTASSIUM  --  4.3  --   --   --   --   --  3.9  --  4.1   CHLORIDE  --  93*  --   --   --   --   --  95*  --  92*   CO2  --  29  --   --   --   --   --  29  --  27   BUN  --  10.4  --   --   --   --   --  11.9  --  12.9   CR  --  1.16  --   --   --   --   --  1.35*  --  1.04   ANIONGAP  --  9  --   --   --   --   --  9  --  11   TONY  --  8.3*  --   --   --   --   --  8.3*  --  8.4*   GLC 82 94  --  102*   < >  --    < > 84   < > 158*   ALBUMIN  --  2.6*  --   --   --   --   --  2.7*  --  2.7*   PROTTOTAL  --  5.0*  --   --   --   --   --  5.1*  --  5.3*   BILITOTAL  --  0.7  --   --   --   --   --  0.7  --  0.9   ALKPHOS  --  92  --   --   --   --   --  96  --  96   ALT  --  26  --   --   --   --   --  25  --  26   AST  --  36  --   --   --   --   --  40  --  40    < > = values in this interval not displayed.      GLUCOSE:   Recent Labs   Lab 04/03/23  0730 04/03/23  0442 04/02/23  1930 04/02/23  1236 04/02/23  0806 04/02/23  0409   GLC 82 94 102* 100* 79 84

## 2023-04-03 NOTE — PLAN OF CARE
Occupational Therapy Discharge Summary    Reason for therapy discharge:    All goals and outcomes met, no further needs identified.    Progress towards therapy goal(s). See goals on Care Plan in [facility] electronic health record for goal details.  Goals met    Therapy recommendation(s):    Continued therapy is recommended.  Rationale/Recommendations:  Pt has met all IP OT goals and Physical Therapy will follow this admit to continue with CR rehab as pt will benefit maximizing cardiovascular health. Recommend OP CR follow up upon discharge from hospital.

## 2023-04-03 NOTE — PLAN OF CARE
Neuro: A&Ox4. Able to make needs known.  Cardiac: SR with HR's 70-80's. BBB. HM3, no alarms this shift. Doppler maps in 70's. Afebrile.           Respiratory: Sating >92% on RA. Spot checking per pt request.   GI/: Adequate UOP via urinal/BR.  Diet/appetite: Tolerating regular diet + 2L FR. Eating well.   Activity: SBA for cord management otherwise independent, up to chair and in room. Encouraged OOB activity. On sternal precautions.  Pain: C/o L side rib/flank pain. PRN IV Dilaudid x1 with minimal relief.  Skin: No new deficits noted. Sternum incision /surgical site approximated, AMY. R groin site, ecchymotic, unchanged.  LDA's: L PIV - SL.     Plan: Will continue with POC and notify primary team with any changes.    Fazal Delgado RN on 4/3/2023 at 7:00 AM

## 2023-04-04 LAB
ANION GAP SERPL CALCULATED.3IONS-SCNC: 12 MMOL/L (ref 7–15)
BUN SERPL-MCNC: 9.9 MG/DL (ref 6–20)
CALCIUM SERPL-MCNC: 8.3 MG/DL (ref 8.6–10)
CHLORIDE SERPL-SCNC: 94 MMOL/L (ref 98–107)
CREAT SERPL-MCNC: 1.04 MG/DL (ref 0.67–1.17)
CRP SERPL-MCNC: 61.2 MG/L
DEPRECATED HCO3 PLAS-SCNC: 28 MMOL/L (ref 22–29)
ERYTHROCYTE [DISTWIDTH] IN BLOOD BY AUTOMATED COUNT: 14.9 % (ref 10–15)
GFR SERPL CREATININE-BSD FRML MDRD: 85 ML/MIN/1.73M2
GLUCOSE BLDC GLUCOMTR-MCNC: 139 MG/DL (ref 70–99)
GLUCOSE BLDC GLUCOMTR-MCNC: 89 MG/DL (ref 70–99)
GLUCOSE SERPL-MCNC: 96 MG/DL (ref 70–99)
HCT VFR BLD AUTO: 31.2 % (ref 40–53)
HGB BLD-MCNC: 9.7 G/DL (ref 13.3–17.7)
INR PPP: 1.63 (ref 0.85–1.15)
MAGNESIUM SERPL-MCNC: 1.8 MG/DL (ref 1.7–2.3)
MCH RBC QN AUTO: 29.9 PG (ref 26.5–33)
MCHC RBC AUTO-ENTMCNC: 31.1 G/DL (ref 31.5–36.5)
MCV RBC AUTO: 96 FL (ref 78–100)
PLATELET # BLD AUTO: 542 10E3/UL (ref 150–450)
POTASSIUM SERPL-SCNC: 3.8 MMOL/L (ref 3.4–5.3)
RBC # BLD AUTO: 3.24 10E6/UL (ref 4.4–5.9)
SODIUM SERPL-SCNC: 134 MMOL/L (ref 136–145)
WBC # BLD AUTO: 16 10E3/UL (ref 4–11)

## 2023-04-04 PROCEDURE — 99233 SBSQ HOSP IP/OBS HIGH 50: CPT | Mod: 25 | Performed by: INTERNAL MEDICINE

## 2023-04-04 PROCEDURE — 250N000011 HC RX IP 250 OP 636: Performed by: PHYSICIAN ASSISTANT

## 2023-04-04 PROCEDURE — 120N000003 HC R&B IMCU UMMC

## 2023-04-04 PROCEDURE — 250N000013 HC RX MED GY IP 250 OP 250 PS 637

## 2023-04-04 PROCEDURE — 86140 C-REACTIVE PROTEIN: CPT | Performed by: STUDENT IN AN ORGANIZED HEALTH CARE EDUCATION/TRAINING PROGRAM

## 2023-04-04 PROCEDURE — 250N000013 HC RX MED GY IP 250 OP 250 PS 637: Performed by: STUDENT IN AN ORGANIZED HEALTH CARE EDUCATION/TRAINING PROGRAM

## 2023-04-04 PROCEDURE — 250N000011 HC RX IP 250 OP 636: Performed by: STUDENT IN AN ORGANIZED HEALTH CARE EDUCATION/TRAINING PROGRAM

## 2023-04-04 PROCEDURE — 250N000013 HC RX MED GY IP 250 OP 250 PS 637: Performed by: INTERNAL MEDICINE

## 2023-04-04 PROCEDURE — 93750 INTERROGATION VAD IN PERSON: CPT | Performed by: INTERNAL MEDICINE

## 2023-04-04 PROCEDURE — 83735 ASSAY OF MAGNESIUM: CPT | Performed by: NURSE PRACTITIONER

## 2023-04-04 PROCEDURE — 36415 COLL VENOUS BLD VENIPUNCTURE: CPT | Performed by: NURSE PRACTITIONER

## 2023-04-04 PROCEDURE — 85027 COMPLETE CBC AUTOMATED: CPT | Performed by: NURSE PRACTITIONER

## 2023-04-04 PROCEDURE — 80048 BASIC METABOLIC PNL TOTAL CA: CPT | Performed by: NURSE PRACTITIONER

## 2023-04-04 PROCEDURE — 85610 PROTHROMBIN TIME: CPT | Performed by: STUDENT IN AN ORGANIZED HEALTH CARE EDUCATION/TRAINING PROGRAM

## 2023-04-04 PROCEDURE — 99254 IP/OBS CNSLTJ NEW/EST MOD 60: CPT | Mod: 24 | Performed by: STUDENT IN AN ORGANIZED HEALTH CARE EDUCATION/TRAINING PROGRAM

## 2023-04-04 RX ORDER — POTASSIUM CHLORIDE 750 MG/1
20 TABLET, EXTENDED RELEASE ORAL ONCE
Status: COMPLETED | OUTPATIENT
Start: 2023-04-04 | End: 2023-04-04

## 2023-04-04 RX ORDER — WARFARIN SODIUM 5 MG/1
5 TABLET ORAL
Status: COMPLETED | OUTPATIENT
Start: 2023-04-04 | End: 2023-04-04

## 2023-04-04 RX ORDER — MAGNESIUM OXIDE 400 MG/1
400 TABLET ORAL EVERY 4 HOURS
Status: COMPLETED | OUTPATIENT
Start: 2023-04-04 | End: 2023-04-04

## 2023-04-04 RX ADMIN — OXYCODONE HYDROCHLORIDE 10 MG: 10 TABLET ORAL at 12:21

## 2023-04-04 RX ADMIN — PIPERACILLIN SODIUM AND TAZOBACTAM SODIUM 4.5 G: 4; .5 INJECTION, POWDER, LYOPHILIZED, FOR SOLUTION INTRAVENOUS at 04:28

## 2023-04-04 RX ADMIN — DIGOXIN 250 MCG: 125 TABLET ORAL at 08:24

## 2023-04-04 RX ADMIN — OXYCODONE HYDROCHLORIDE 10 MG: 10 TABLET ORAL at 00:26

## 2023-04-04 RX ADMIN — OXYCODONE HYDROCHLORIDE 10 MG: 10 TABLET ORAL at 17:09

## 2023-04-04 RX ADMIN — ACETAMINOPHEN 975 MG: 325 TABLET, FILM COATED ORAL at 15:20

## 2023-04-04 RX ADMIN — GABAPENTIN 300 MG: 300 CAPSULE ORAL at 08:24

## 2023-04-04 RX ADMIN — MAGNESIUM OXIDE TAB 400 MG (241.3 MG ELEMENTAL MG) 400 MG: 400 (241.3 MG) TAB at 08:23

## 2023-04-04 RX ADMIN — OXYCODONE HYDROCHLORIDE 10 MG: 10 TABLET ORAL at 21:18

## 2023-04-04 RX ADMIN — EMPAGLIFLOZIN 10 MG: 10 TABLET, FILM COATED ORAL at 08:22

## 2023-04-04 RX ADMIN — WARFARIN SODIUM 5 MG: 5 TABLET ORAL at 17:09

## 2023-04-04 RX ADMIN — LISINOPRIL 2.5 MG: 2.5 TABLET ORAL at 08:23

## 2023-04-04 RX ADMIN — GABAPENTIN 300 MG: 300 CAPSULE ORAL at 19:50

## 2023-04-04 RX ADMIN — OXYCODONE HYDROCHLORIDE 10 MG: 10 TABLET ORAL at 08:23

## 2023-04-04 RX ADMIN — HYDROMORPHONE HYDROCHLORIDE 0.4 MG: 0.2 INJECTION, SOLUTION INTRAMUSCULAR; INTRAVENOUS; SUBCUTANEOUS at 19:50

## 2023-04-04 RX ADMIN — PIPERACILLIN SODIUM AND TAZOBACTAM SODIUM 4.5 G: 4; .5 INJECTION, POWDER, LYOPHILIZED, FOR SOLUTION INTRAVENOUS at 12:21

## 2023-04-04 RX ADMIN — GABAPENTIN 300 MG: 300 CAPSULE ORAL at 15:19

## 2023-04-04 RX ADMIN — OXYCODONE HYDROCHLORIDE 10 MG: 10 TABLET ORAL at 04:28

## 2023-04-04 RX ADMIN — LIDOCAINE PATCH 4% 1 PATCH: 40 PATCH TOPICAL at 08:23

## 2023-04-04 RX ADMIN — MAGNESIUM OXIDE TAB 400 MG (241.3 MG ELEMENTAL MG) 400 MG: 400 (241.3 MG) TAB at 12:21

## 2023-04-04 RX ADMIN — PANTOPRAZOLE SODIUM 40 MG: 40 TABLET, DELAYED RELEASE ORAL at 08:25

## 2023-04-04 RX ADMIN — ASPIRIN 81 MG 81 MG: 81 TABLET ORAL at 08:25

## 2023-04-04 RX ADMIN — SPIRONOLACTONE 12.5 MG: 25 TABLET, FILM COATED ORAL at 08:22

## 2023-04-04 RX ADMIN — AMIODARONE HYDROCHLORIDE 200 MG: 200 TABLET ORAL at 08:24

## 2023-04-04 RX ADMIN — POTASSIUM CHLORIDE 20 MEQ: 750 TABLET, EXTENDED RELEASE ORAL at 08:23

## 2023-04-04 ASSESSMENT — ACTIVITIES OF DAILY LIVING (ADL)
ADLS_ACUITY_SCORE: 20

## 2023-04-04 NOTE — PROGRESS NOTES
[facility]    Cardiology Consult Progress Note- Cardiology      Date of Admission:  3/15/2023     Assessment & Plan: HVSL   57 year old man with past medical history of HFrEF (EF 10% 5/2022) 2/2 NICM s/p ICD, tobacco use disorder, marijuana use, HTN, lumbar radiculopathy, CKD who presents after being found down at home. Found to have had a prolonged episode of VT with concern for cardiac arrest s/p ROSC in the field now s/p LVAD with HM3 on 03/23/23 and tricuspid annuloplasty.     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 Presented with out of hospital cardiac arrest with VT for 5 min 43 seconds of VT at 199 bpm, with spontaneous conversion to sinus rhythm without defibrillation on device interrogation on 3/15/2023. INR subtherapeutic (goal of 2-3).    - VT secondary prevention ICD in place  - Volume status: hypervolemic. Additional IV Bumex 3mg today w/ hope to switch to oral Bumex tomorrow  .- BB: Hold PTA carvedilol 12.5 mg BID   - ACEi/ARB/ARNI: Lisinopril 2.5mg  - MRA: continue spironolactone 12.5 mg daily  - SGLT2i: continue Empagliflozin   - SCD ppx/BiV pacer: ICD, reprogrammed device to provide ATP burst at VT threshold  - Continue warfarin per pharmacy dosing.   - Please resume Heparin gtt bridge when safe from surgical perspective due to subtherapeutic INR  - Continue digoxin 250 mcg daily  - Amiodarone 200 mg daily       Patient staffed w/ Dr. Joanna Leon, DO  Internal Medicine PGY1  [facility]  [facility]  ______________________________________________________________________    Interval History   Care team notes reviewed. No acute events overnight.  Feeling well overall, no major complaints.     Physical Exam   Vital Signs: Temp: 97.7  F (36.5  C) Temp src: Oral BP: (!) 78/72 Pulse: 86   Resp: 16 SpO2: 97 % O2 Device: None (Room air)    Weight: 174 lbs 2.61 oz    LVAD Parameters:  Heartmate 3 LEFT VS  Flow (Lpm): 4.5 Lpm  Pulse Index (PI): 3.2 PI  Speed (rpm): 5200 rpm  Power (mari): 3.6 mari    Constitutional: awake, alert, cooperative, no apparent distress, and appears stated age  Eyes: sclera clear, conjunctiva normal  Respiratory: No increased work of breathing, good air exchange, no wheezing  Cardiovascular: LVAD hum.  JVP to 12 mmHg  GI: soft, non-distended, non-tender, no masses palpated  Skin: no bruising or bleeding  Neurologic: Awake, alert, interactive         Intake/Output Summary (Last 24 hours) at 4/2/2023 0654  Last data filed at 4/2/2023 0410  Gross per 24 hour   Intake 2350 ml   Output 3125 ml   Net -775 ml     Laboratory values: Reviewed  Imaging: Reviewed

## 2023-04-04 NOTE — PROGRESS NOTES
Wife Landry and the VAD coordinator Zbigniew changed the DLES dressing today. There was a small amount of serous drainage but nothing wet to culture once the dressing was removed.  The suture is intact. The pinkness right at the exit site is less today than it was yesterday. The small amount of drainage is likely to continue while the suture is in place. The shininess on the old chest tube sits appears to be glue. There is some skin irritation where the driveline anchor was affixed.    Landry, Akshat, and son Oneil will finish up VAD education tomorrow and take the test after class. The test will be reviewed on Thursday. Most likely, from a VAD education standpoint, Akshat will be ready to go home on Thursday.

## 2023-04-04 NOTE — PLAN OF CARE
"Goal Outcome Evaluation:    BP (!) 78/72 (BP Location: Left arm)   Pulse 86   Temp 97.7  F (36.5  C) (Oral)   Resp 16   Ht 1.702 m (5' 7\")   Wt 79 kg (174 lb 2.6 oz)   SpO2 97%   BMI 27.28 kg/m        Neuro: A&Ox4.   Cardiac: SR. Heart Mate 3 Lvad  so pt Map are above 65 today.  Respiratory: Sating 94%>on RA.  GI/: Adequate urine output. BM X1  Diet/appetite: Tolerating regular diet with 2 L FR. Eating well.  Activity:  Independent/SBA, up to chair and in halls.  Pain: At acceptable level on current regimen.   Skin: No new deficits noted.  LDA's:one PIV    Plan: Continue with POC. Notify primary team with changes.    "

## 2023-04-04 NOTE — CONSULTS
General Infectious Disease Service Consultation - [place] Team      Patient: Akshat Fragoso  : 1965  MRN: [id number]  Date of Admission: 3/15/2023  Date of Visit: 2023  Requesting Provider: Shaun Aguiar    Assessment and Recommendations:   Problem List:  1. Out of hospital cardiac arrest  2. HFrEF 2/2 NICM  3. S/p LVAD placement, TVR and PFO repair 3/23/23  4. HTN  5. Atrial fibrillation, now NSR  6. Cardiogenic shock   7. Stress induced leukocytosis   8. Acute blood loss anemia and thrombocytopenia      Discussion:  Akshat Fragoso is a 57 year old male with PMH of HFrEF  secondary to NICM s/p ICD, chronic tobacco/marijuana use, COPD, HTN, CKD stage III, who presented to [facility] on 3/15 after being found down from a VT arrest. He is s/p LVAD placement, TVR, and PFO repair 3/23/22.    On 3/29, patient c/o cough and WBCs, CRP were up-trending; thus, sputum culture, blood culture and UA sent, which came back negative. Chest X-ray showed left lung atelectasis (unchanged from previous CXRs) and new left sided pleural effusion. Started on IV Zosyn and Vancomycin for suspicion of HAP. MRSA swab nares  negative and IV Vancomycin stopped. On 4/3, CT chest done, suggestive of minimal left pleural effusion and underlying atelectasis with mediastinal adenopathy (possibly reactive).    Akshat has remained afebrile and is currently hemodynamically stable. He has multiple dental caries, but no active source of infection in the oral cavity is present. Considering down-trending WBCs, CRP and a negative basic infectious workup, we believe the cause of persistent leukocytosis and pleural effusion to be post-operative. He has also received an adequate course of antibiotics for any possible underlying pneumonia (CAP or HAP). Thus we recommend stopping antibiotics and monitoring clinically.     Recommendations:  1. CRP added , was 61.2 (reduced from 3/30 value of 132)  2. Stop IV Zosyn and watch.  3. If fever spike or  up-trending WBC/CRP, consider dental source of infection.    Recommendations discussed with Dr. Craven.    Thank you for this consult. The [facility] team will sign off. Please feel free to call with any questions.     Maximo White   Medical Student   Pager: [phone]      Attending attestation    I personally examined and evaluated this patient on 4/423. I discussed the patient with the medical student and agree with the assessment and plan of care as documented in the student's note of 4/423.I have verified the history and personally performed the physical exam and medical decision making.I personally reviewed chart including vital signs, medications, labs, imaging. Key findings: No obvious active infection as per above excellent note, appropriate trending down of inflammatory markers poostop. Surgical site looks good on images. Can stop antibiotics and watch. Could benefit from an outpt dental follow up with his dentist. ID will sign off at this time, please call us back if new issues/concerns.     Irene Craven  Infectious Disease Staff Physician      History of Present Illness:   Akshat gives h/o productive cough with whitish sputum, non blood tinged, since admission, that has decreased over the past 3 days. C/o rib soreness. He also gives h/o multiple dental caries.  No chest pain, fever, chills.  No redness/discharge from surgical site/drain site.   No burning micturition, vomiting, diarrhea, sore throat, rash.      Review of Systems:   10 point review of systems obtained and negative.     Past Medical History:     Past Medical History:   Diagnosis Date     Acute right lumbar radiculopathy 11/02/2015     Acute systolic heart failure (H) 01/10/2017     Cardiomyopathy (H) 01/10/2017     CKD (chronic kidney disease) stage 3, GFR 30-59 ml/min (H) 01/30/2020     JOHNSON (dyspnea on exertion)      ED (erectile dysfunction) 10/02/2019     Erectile dysfunction      ICD (implantable cardioverter-defibrillator) in  place- [employer], single chamber- NOT dependent 10/09/2017     Personal history of smoking 01/04/2017     Pulmonary nodules 01/17/2017    CT 11/2018:  Bilateral pulmonary nodules measuring up to 4 mm. No new or enlarging pulmonary nodules.       Allergies:   No Known Allergies    Recent Antimicrobials:   Ceftriaxone 3/18 - 3/22  Doxycycline 3/18 - 3/22  Rifampin 3/23 - 3/25  Fluconazole 3/23 - 3/25  Levofloxacin 3/23 - 3/25  Vancomycin 3/23 - 3/25, 3/29 - 4/3  Zosyn 3/29 - present     Family History:     Family History   Problem Relation Age of Onset     Parkinsonism Mother      Atrial fibrillation Father      Heart Failure Father      Prostate Cancer Father      Skin Cancer Father      Anorexia nervosa Daughter      Other - See Comments Granddaughter         premature birth       Social History:     Social History     Socioeconomic History     Marital status:      Spouse name: Cheryl     Number of children: 2     Years of education: Not on file     Highest education level: Not on file   Occupational History     Occupation: Construction      Employer: SELF   Tobacco Use     Smoking status: Light Smoker     Packs/day: 0.25     Years: 15.00     Pack years: 3.75     Types: Cigarettes     Smokeless tobacco: Former     Quit date: 10/24/2011   Vaping Use     Vaping status: Never Used   Substance and Sexual Activity     Alcohol use: No     Alcohol/week: 0.0 standard drinks of alcohol     Drug use: No     Sexual activity: Yes     Partners: Female     Birth control/protection: Condom   Other Topics Concern     Parent/sibling w/ CABG, MI or angioplasty before 65F 55M? Yes     Comment: both parents had stints placed    Social History Narrative     Not on file     Social Determinants of Health     Financial Resource Strain: Not on file   Food Insecurity: Not on file   Transportation Needs: Not on file   Physical Activity: Not on file   Stress: Not on file   Social Connections: Not on file   Intimate Partner  "Violence: Not on file   Housing Stability: Not on file     HOME/ENVIRONMENT: Lives with wife and son - no sickness recently    OCCUPATION: Retired; used to work as a     TRAVEL: No recent history of travel     OUTDOOR ACTIVITIES: No recent history due to cardiac disease    ANIMALS: Owns a dog, vaccinated and in good health     DENTAL WORK: History of multiple tooth caries and a root canal 5 years ago. No active infection.     TUBERCULOSIS: Tested negative a few years ago    TOBACCO/ALCOHOL/RECREATIONAL DRUGS: Smokes tobacco (40 pack year history), reduced recently, and marijuana       Physical Exam:   BP (!) 78/72 (BP Location: Left arm)   Pulse 82   Temp 98.4  F (36.9  C) (Oral)   Resp 16   Ht 1.702 m (5' 7\")   Wt 79 kg (174 lb 2.6 oz)   SpO2 97%   BMI 27.28 kg/m       General: Alert, conscious, sitting in bed. Appears comfortable.   HEENT: Sclera and conjunctiva clear. Pupils equal. TMs normal. Nasal cavity without lesions. No tonsillar erythema, edema, or exudates. Multiple dental caries noted.   Heart: RRR. No murmurs appreciated.  Lungs: Effort normal. CTAB. No wheezes or crackles.   Abdomen: Bowel sounds present. S/NT/ND. No organomegaly appreciated.   Extremities: No peripheral edema.   Skin: Surgical site (sternotomy) and drain site appear healthy.   Neurologic: Grossly non-focal.     Laboratory Data:     Lab Results   Component Value Date    WBC 16.0 (H) 04/04/2023    WBC 18.8 (H) 04/03/2023    WBC 17.0 (H) 04/02/2023       CRP Inflammation   Date Value Ref Range Status   04/04/2023 61.20 (H) <5.00 mg/L Final   03/30/2023 - 132    Recent Microbiology Data:   Urine analysis 3/29: WNL  Blood culture 3/29: NGTD  Sputum culture 3/29: NGTD  MRSA nares 4/2: Negative     Imaging:   Chest X-ray 3/31/23  Narrative & Impression   Chest 2 views     INDICATION: Follow consolidation     COMPARISON: 3/29/2023     FINDINGS: Heart size normal. LVAD. Implantable cardiac defibrillator.  Lungs and " pulmonary vasculature otherwise appear grossly unchanged.  Mildly prominent thoracic kyphosis.                                                                      IMPRESSION: Unchanged possible left lower lobe  consolidation/atelectasis with possible superimposed pleural effusion.  This area is obscured by the LVAD the frontal view but persists  unchanged on the lateral view.     FANNY MELARA MD         SYSTEM ID:  [id number]     CT Chest 4/3  Narrative & Impression   EXAMINATION: CT CHEST W/O CONTRAST, 4/3/2023 8:11 PM     TECHNIQUE:  Helical CT images from the thoracic inlet through the lung  bases were obtained without IV contrast.      COMPARISON: Chest x-ray 3/31/2023. CT chest, abdomen, and pelvis  3/17/2023.     HISTORY: leukocytosis     FINDINGS:     Chest:   Postsurgical changes of the chest. Median sternotomy wires are intact.  LVAD terminating over the left ventricle. Left chest wall implantable  cardiac defibrillator with leads terminating over the right atrium and  right ventricle.     Thyroid gland is unremarkable. Heart size is normal. No pericardial  effusion. No significant coronary artery calcifications. Thoracic  aorta and pulmonary arteries are normal in caliber with conventional  three-vessel branching pattern of the aortic arch. Esophagus is  unremarkable.     Mediastinal adenopathy.     Trachea and central airways are clear. Subsegmental atelectasis in the  lower lungs. Small/moderate left pleural effusion. Scattered calcified  granulomas. No pneumothorax.     Abdomen: Examination of the upper abdomen is limited. Bilateral  adrenal gland thickening, unchanged.     Bones: No suspicious osseous lesion. Chronic wedging of the  midthoracic spine.       Soft Tissues: No suspicious mass.                                                                      IMPRESSION:   1.  Small left pleural effusion with adjacent compressive atelectasis.  2.  Mediastinal adenopathy, likely reactive     I have  personally reviewed the examination and initial interpretation  and I agree with the findings.     AMANDA URIBE MD         SYSTEM ID:  [id number]

## 2023-04-04 NOTE — PROGRESS NOTES
Cardiovascular Surgery Progress Note  04/04/2023         Assessment and Plan:     57 year old male with PMH of HFrEF (10%) secondary to NICM (suspected viral etiology) s/p ICD, chronic tobacco/marijuana use, COPD, HTN, CKD stage III, who presented to [facility] on 3/15 after being found down from a VT arrest. CPR was performed by family. Spontaneously converted to NSR without defibrillation. Down time suspected to be ~ 6 minutes. Presents to [facility] for LVAD placement with preop IABP by Dr. Matias on 3/23/23. He is now s/p LVAD (Heartmate III) placement, TVR, and PFO repair with Dr. Matias and Dr. Waller 3/23/22.    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 RV is mildly dilated and RV function is probably normal on milrinone with moderate to severe TR (there is reversal of hepatic venous flow). There is a swan in place which may be inhibiting coaptation. Trace AI. Mild MR. No MS. There is a continuous left to right shunt across a PFO.  Post-bypass JOAN: No AI. The aortic valve opens 1:3 cardiac cycles. No residual PFO. Atrial septum bulges into the left atrium. TR ring, mild to moderate TR. RV function mildly reduced on epi/norepi/vaso nitric. Inflow cannula is well positioned with low velocities. There is no visible aortic dissection.    HD stable. MAPS 60s-70s. No flow events overnight. No further episodes of NSVT.  - ASA 81 mg daily  - BB not indicated  - lisinopril 2.5 mg daily  - digoxin 250 mcg daily  - spironolactone 12.5 mg daily  - Diuresis as below (per cardiology)    Atrial fibrillation with RVR 3/28, now NSR  - amiodarone  infusion 0.5 mg/h started 3/28 per cards, ongoing afib RVR so amiodarone increased to 1 mg/h  - transitioned to PO amiodarone 400 mg BID 3/29, down to 200 mg daily 4/2    Pulmonary:  Extubated POD 1 to 4 lpm via NC. Now saturating well on room air.   - Pulm hygiene, IS, activity and deep breathing    Neurology / MSK:  Acute post-operative pain   Pain well controlled with current regimen:  - Scheduled: Tylenol   - PRN: PO Oxycodone PRN. Refusing methocarbamol as it gives him weakness     / Renal / Fluid / Electrolytes:  Hyponatremia, improving, likely due to volume overload -- diuresis as below  Most recent creatinine 1.04; adequate UOP.   FLUID STATUS: Pre-op weight 172 lb, weight today 174 lb; I/O past 24 hours: -2.3 L  - Diuresis per cardiology -- IV Bumex  - Replete lytes per protocol  - Strict I/O, daily weights  - Avoid/limit nephrotoxins as able  Urinary retention post-op, resolved  - remove muro 3/30    GI / Nutrition:   Elevated LFTs, resolved   - Tolerating regular diet  - Continue bowel regimen, last BM 4/2    Endocrine:  Stress induced hyperglycemia   Hgb A1c 5.6  - Initially managed on insulin drip postop, transitioned to medium sliding scale; goal BG <180 for optimal healing    Infectious Disease:  Stress induced leukocytosis  Concern for pneumonia  WBC 16 and trending down, remains afebrile  - CRP elevated, procal elevated to 0.54 --> 0.49  - CXR with left consolidation concerning for pneumonia and patient complains of cough   - UA, blood cultures, and sputum sent 3/29 -- UA neg, blood cultures all NGTD  - started empiric IV vancomycin and Zosyn for likely HAP versus alternate post-operative infection. Discontinue vanco given negative MRSA nare  - 4/2 send MRSA nares, negative  - CT chest 4/3 for persistent leukocytosis -- no infectious source identified   - ID consult for persistent leukocytosis, 4/3 -- pending   - anticipate transition to PO antibiotics for the next 1-2 days prior to discharge,  will await ID recs   - Completed perioperative antibiotics  - Continue to monitor fever curve, CBC    Hematology:   Acute blood loss anemia and thrombocytopenia  Hgb stable; Plt stable, no signs or symptoms of active bleeding  - CBC daily    Anticoagulation:   - ASA 81 mg  - Coumadin for LVAD with a goal INR of 2-3, INR sub-therapeutic this AM -- no heparin bridge per discussion with Dr. Matias    MSK/Skin:  Sternotomy  Surgical incision  - Sternal precautions  - Incisional cares per protocol    Prophylaxis:   - Stress ulcer prophylaxis: Pantoprazole 40 mg daily  - DVT prophylaxis: warfarin, SCD    Disposition:   - Transferred to  on 3/26  - Therapies recommending discharge to home, likely Thursday 3/6  - Has 3 LVAD education sessions left      Pooja Crowe PA-C on 4/4/2023 at 10:19 AM          Interval History:     No acute events overnight. States pain is well managed on current regimen, did not sleep very well. Breathing is stable on room air, working with IS. Tolerating diet, is passing flatus, +BM, no n/v. Denies chest pain, palpitations, dizziness, syncopal symptoms, chills, myalgias, sternal popping/clicking. Still having rib soreness.         Physical Exam:     Gen: A&Ox4, NAD  Neuro: Intact no focal deficits   CV: LVAD hum  Pulm: CTA, no wheezing or rhonchi, normal breathing on RA  Abd: nondistended, normal BS, soft, nontender  Ext: trace peripheral edema  Incision: clean, dry, intact, no erythema, sternum stable  Tubes/drain sites: dressing clean and dry  LVAD dressing clean, dry, intact.   Speed: 5250, Power: 3.7, Flow: 4.2, PI: 3.2         Data:    Imaging:  reviewed recent imaging, no acute concerns    Recent Results (from the past 24 hour(s))   CT Chest w/o Contrast    Narrative    EXAMINATION: CT CHEST W/O CONTRAST, 4/3/2023 8:11 PM    TECHNIQUE:  Helical CT images from the thoracic inlet through the lung  bases were obtained without IV contrast.     COMPARISON: Chest x-ray 3/31/2023. CT chest,  abdomen, and pelvis  3/17/2023.    HISTORY: leukocytosis    FINDINGS:    Chest:   Postsurgical changes of the chest. Median sternotomy wires are intact.  LVAD terminating over the left ventricle. Left chest wall implantable  cardiac defibrillator with leads terminating over the right atrium and  right ventricle.    Thyroid gland is unremarkable. Heart size is normal. No pericardial  effusion. No significant coronary artery calcifications. Thoracic  aorta and pulmonary arteries are normal in caliber with conventional  three-vessel branching pattern of the aortic arch. Esophagus is  unremarkable.    Mediastinal adenopathy.    Trachea and central airways are clear. Subsegmental atelectasis in the  lower lungs. Small/moderate left pleural effusion. Scattered calcified  granulomas. No pneumothorax.    Abdomen: Examination of the upper abdomen is limited. Bilateral  adrenal gland thickening, unchanged.    Bones: No suspicious osseous lesion. Chronic wedging of the  midthoracic spine.      Soft Tissues: No suspicious mass.      Impression    IMPRESSION:   1.  Small left pleural effusion with adjacent compressive atelectasis.  2.  Mediastinal adenopathy, likely reactive    I have personally reviewed the examination and initial interpretation  and I agree with the findings.    AMANDA URIBE MD         SYSTEM ID:  [id number]        Labs:  Recent Labs   Lab 04/04/23  0642 04/04/23  0532 04/03/23  1537 04/03/23  0730 04/03/23  0442 04/02/23  1952 04/02/23  0806 04/02/23  0409   WBC  --  16.0*  --   --  18.8*  --   --  17.0*   HGB  --  9.7*  --   --  9.7*  --   --  9.8*   MCV  --  96  --   --  96  --   --  96   PLT  --  542*  --   --  546*  --   --  488*   INR  --  1.63*  --   --  1.68*  --   --  2.00*   NA  --  134*  --   --  131*  131* 132*   < > 133*  133*   POTASSIUM  --  3.8  --   --  4.3  --   --  3.9   CHLORIDE  --  94*  --   --  93*  --   --  95*   CO2  --  28  --   --  29  --   --  29   BUN  --  9.9  --   --  10.4   --   --  11.9   CR  --  1.04  --   --  1.16  --   --  1.35*   ANIONGAP  --  12  --   --  9  --   --  9   TONY  --  8.3*  --   --  8.3*  --   --  8.3*   GLC 89 96 102*   < > 94  --    < > 84   ALBUMIN  --   --   --   --  2.6*  --   --  2.7*   PROTTOTAL  --   --   --   --  5.0*  --   --  5.1*   BILITOTAL  --   --   --   --  0.7  --   --  0.7   ALKPHOS  --   --   --   --  92  --   --  96   ALT  --   --   --   --  26  --   --  25   AST  --   --   --   --  36  --   --  40    < > = values in this interval not displayed.      GLUCOSE:   Recent Labs   Lab 04/04/23  0642 04/04/23  0532 04/03/23  1537 04/03/23  0730 04/03/23  0442 04/02/23  1930   GLC 89 96 102* 82 94 102*

## 2023-04-04 NOTE — PLAN OF CARE
Neuro: A&Ox4.   Cardiac: SR w/ BBB. LVAD HM3. Within parameters. Dopplered MAPs in the 70s.  Respiratory: Sating above 94 on RA.  GI/: Adequate urine output. NO BM.  Diet/appetite: Tolerating reg diet with 2L FR  Activity:  SBA to bathroom  Pain: At acceptable level on current regimen.   Skin: No new deficits noted.  LDA's: L PIV SL    Plan: Continue with POC. Notify primary team with changes.

## 2023-04-05 ENCOUNTER — CARE COORDINATION (OUTPATIENT)
Dept: CARDIOLOGY | Facility: CLINIC | Age: 58
End: 2023-04-05
Payer: COMMERCIAL

## 2023-04-05 LAB
ANION GAP SERPL CALCULATED.3IONS-SCNC: 12 MMOL/L (ref 7–15)
ANION GAP SERPL CALCULATED.3IONS-SCNC: 8 MMOL/L (ref 7–15)
BUN SERPL-MCNC: 10.9 MG/DL (ref 6–20)
BUN SERPL-MCNC: 9 MG/DL (ref 6–20)
CALCIUM SERPL-MCNC: 8.6 MG/DL (ref 8.6–10)
CALCIUM SERPL-MCNC: 8.7 MG/DL (ref 8.6–10)
CHLORIDE SERPL-SCNC: 95 MMOL/L (ref 98–107)
CHLORIDE SERPL-SCNC: 96 MMOL/L (ref 98–107)
CREAT SERPL-MCNC: 0.89 MG/DL (ref 0.67–1.17)
CREAT SERPL-MCNC: 0.92 MG/DL (ref 0.67–1.17)
CRP SERPL-MCNC: 69.1 MG/L
DEPRECATED HCO3 PLAS-SCNC: 27 MMOL/L (ref 22–29)
DEPRECATED HCO3 PLAS-SCNC: 30 MMOL/L (ref 22–29)
ERYTHROCYTE [DISTWIDTH] IN BLOOD BY AUTOMATED COUNT: 15.3 % (ref 10–15)
ERYTHROCYTE [DISTWIDTH] IN BLOOD BY AUTOMATED COUNT: 15.4 % (ref 10–15)
GFR SERPL CREATININE-BSD FRML MDRD: >90 ML/MIN/1.73M2
GFR SERPL CREATININE-BSD FRML MDRD: >90 ML/MIN/1.73M2
GLUCOSE BLDC GLUCOMTR-MCNC: 91 MG/DL (ref 70–99)
GLUCOSE SERPL-MCNC: 119 MG/DL (ref 70–99)
GLUCOSE SERPL-MCNC: 87 MG/DL (ref 70–99)
HCT VFR BLD AUTO: 33.3 % (ref 40–53)
HCT VFR BLD AUTO: 36.1 % (ref 40–53)
HGB BLD-MCNC: 10 G/DL (ref 13.3–17.7)
HGB BLD-MCNC: 11 G/DL (ref 13.3–17.7)
INR PPP: 1.7 (ref 0.85–1.15)
MAGNESIUM SERPL-MCNC: 1.9 MG/DL (ref 1.7–2.3)
MCH RBC QN AUTO: 29.3 PG (ref 26.5–33)
MCH RBC QN AUTO: 29.6 PG (ref 26.5–33)
MCHC RBC AUTO-ENTMCNC: 30 G/DL (ref 31.5–36.5)
MCHC RBC AUTO-ENTMCNC: 30.5 G/DL (ref 31.5–36.5)
MCV RBC AUTO: 97 FL (ref 78–100)
MCV RBC AUTO: 98 FL (ref 78–100)
PLATELET # BLD AUTO: 626 10E3/UL (ref 150–450)
PLATELET # BLD AUTO: 685 10E3/UL (ref 150–450)
POTASSIUM SERPL-SCNC: 4.1 MMOL/L (ref 3.4–5.3)
POTASSIUM SERPL-SCNC: 4.2 MMOL/L (ref 3.4–5.3)
RBC # BLD AUTO: 3.41 10E6/UL (ref 4.4–5.9)
RBC # BLD AUTO: 3.72 10E6/UL (ref 4.4–5.9)
SODIUM SERPL-SCNC: 133 MMOL/L (ref 136–145)
SODIUM SERPL-SCNC: 135 MMOL/L (ref 136–145)
UFH PPP CHRO-ACNC: 0.14 IU/ML
UFH PPP CHRO-ACNC: <0.1 IU/ML
WBC # BLD AUTO: 15.3 10E3/UL (ref 4–11)
WBC # BLD AUTO: 17.3 10E3/UL (ref 4–11)

## 2023-04-05 PROCEDURE — 83735 ASSAY OF MAGNESIUM: CPT | Performed by: INTERNAL MEDICINE

## 2023-04-05 PROCEDURE — 85027 COMPLETE CBC AUTOMATED: CPT | Performed by: PHYSICIAN ASSISTANT

## 2023-04-05 PROCEDURE — 86140 C-REACTIVE PROTEIN: CPT | Performed by: PHYSICIAN ASSISTANT

## 2023-04-05 PROCEDURE — 85027 COMPLETE CBC AUTOMATED: CPT | Performed by: NURSE PRACTITIONER

## 2023-04-05 PROCEDURE — 36415 COLL VENOUS BLD VENIPUNCTURE: CPT | Performed by: PHYSICIAN ASSISTANT

## 2023-04-05 PROCEDURE — 250N000013 HC RX MED GY IP 250 OP 250 PS 637: Performed by: STUDENT IN AN ORGANIZED HEALTH CARE EDUCATION/TRAINING PROGRAM

## 2023-04-05 PROCEDURE — 85610 PROTHROMBIN TIME: CPT | Performed by: STUDENT IN AN ORGANIZED HEALTH CARE EDUCATION/TRAINING PROGRAM

## 2023-04-05 PROCEDURE — 120N000003 HC R&B IMCU UMMC

## 2023-04-05 PROCEDURE — 80048 BASIC METABOLIC PNL TOTAL CA: CPT

## 2023-04-05 PROCEDURE — 36415 COLL VENOUS BLD VENIPUNCTURE: CPT

## 2023-04-05 PROCEDURE — 99233 SBSQ HOSP IP/OBS HIGH 50: CPT | Mod: 25 | Performed by: INTERNAL MEDICINE

## 2023-04-05 PROCEDURE — 250N000011 HC RX IP 250 OP 636: Performed by: PHYSICIAN ASSISTANT

## 2023-04-05 PROCEDURE — 250N000013 HC RX MED GY IP 250 OP 250 PS 637

## 2023-04-05 PROCEDURE — 250N000011 HC RX IP 250 OP 636: Performed by: STUDENT IN AN ORGANIZED HEALTH CARE EDUCATION/TRAINING PROGRAM

## 2023-04-05 PROCEDURE — 93750 INTERROGATION VAD IN PERSON: CPT | Performed by: INTERNAL MEDICINE

## 2023-04-05 PROCEDURE — 250N000013 HC RX MED GY IP 250 OP 250 PS 637: Performed by: INTERNAL MEDICINE

## 2023-04-05 PROCEDURE — 250N000011 HC RX IP 250 OP 636

## 2023-04-05 PROCEDURE — 85520 HEPARIN ASSAY: CPT | Performed by: THORACIC SURGERY (CARDIOTHORACIC VASCULAR SURGERY)

## 2023-04-05 PROCEDURE — 36415 COLL VENOUS BLD VENIPUNCTURE: CPT | Performed by: THORACIC SURGERY (CARDIOTHORACIC VASCULAR SURGERY)

## 2023-04-05 RX ORDER — HEPARIN SODIUM 10000 [USP'U]/100ML
0-5000 INJECTION, SOLUTION INTRAVENOUS CONTINUOUS
Status: DISCONTINUED | OUTPATIENT
Start: 2023-04-05 | End: 2023-04-09

## 2023-04-05 RX ORDER — BUMETANIDE 0.25 MG/ML
3 INJECTION INTRAMUSCULAR; INTRAVENOUS ONCE
Status: COMPLETED | OUTPATIENT
Start: 2023-04-05 | End: 2023-04-05

## 2023-04-05 RX ORDER — WARFARIN SODIUM 5 MG/1
5 TABLET ORAL
Status: COMPLETED | OUTPATIENT
Start: 2023-04-05 | End: 2023-04-05

## 2023-04-05 RX ORDER — MAGNESIUM OXIDE 400 MG/1
400 TABLET ORAL EVERY 4 HOURS
Status: COMPLETED | OUTPATIENT
Start: 2023-04-05 | End: 2023-04-05

## 2023-04-05 RX ADMIN — ASPIRIN 81 MG 81 MG: 81 TABLET ORAL at 07:55

## 2023-04-05 RX ADMIN — GABAPENTIN 300 MG: 300 CAPSULE ORAL at 07:54

## 2023-04-05 RX ADMIN — DIGOXIN 250 MCG: 125 TABLET ORAL at 07:55

## 2023-04-05 RX ADMIN — SENNOSIDES AND DOCUSATE SODIUM 2 TABLET: 8.6; 5 TABLET ORAL at 07:55

## 2023-04-05 RX ADMIN — HYDROMORPHONE HYDROCHLORIDE 0.4 MG: 0.2 INJECTION, SOLUTION INTRAMUSCULAR; INTRAVENOUS; SUBCUTANEOUS at 20:09

## 2023-04-05 RX ADMIN — GABAPENTIN 300 MG: 300 CAPSULE ORAL at 13:30

## 2023-04-05 RX ADMIN — AMIODARONE HYDROCHLORIDE 200 MG: 200 TABLET ORAL at 07:55

## 2023-04-05 RX ADMIN — LISINOPRIL 2.5 MG: 2.5 TABLET ORAL at 07:54

## 2023-04-05 RX ADMIN — ACETAMINOPHEN 975 MG: 325 TABLET, FILM COATED ORAL at 07:54

## 2023-04-05 RX ADMIN — OXYCODONE HYDROCHLORIDE 10 MG: 10 TABLET ORAL at 22:06

## 2023-04-05 RX ADMIN — HEPARIN SODIUM AND DEXTROSE 950 UNITS/HR: 10000; 5 INJECTION INTRAVENOUS at 09:38

## 2023-04-05 RX ADMIN — ACETAMINOPHEN 975 MG: 325 TABLET, FILM COATED ORAL at 23:49

## 2023-04-05 RX ADMIN — HYDROMORPHONE HYDROCHLORIDE 0.4 MG: 0.2 INJECTION, SOLUTION INTRAMUSCULAR; INTRAVENOUS; SUBCUTANEOUS at 00:18

## 2023-04-05 RX ADMIN — MAGNESIUM OXIDE TAB 400 MG (241.3 MG ELEMENTAL MG) 400 MG: 400 (241.3 MG) TAB at 13:31

## 2023-04-05 RX ADMIN — MAGNESIUM OXIDE TAB 400 MG (241.3 MG ELEMENTAL MG) 400 MG: 400 (241.3 MG) TAB at 06:36

## 2023-04-05 RX ADMIN — LIDOCAINE PATCH 4% 1 PATCH: 40 PATCH TOPICAL at 10:30

## 2023-04-05 RX ADMIN — BUMETANIDE 3 MG: 0.25 INJECTION INTRAMUSCULAR; INTRAVENOUS at 13:31

## 2023-04-05 RX ADMIN — PANTOPRAZOLE SODIUM 40 MG: 40 TABLET, DELAYED RELEASE ORAL at 07:55

## 2023-04-05 RX ADMIN — HYDROMORPHONE HYDROCHLORIDE 0.4 MG: 0.2 INJECTION, SOLUTION INTRAMUSCULAR; INTRAVENOUS; SUBCUTANEOUS at 15:26

## 2023-04-05 RX ADMIN — OXYCODONE HYDROCHLORIDE 10 MG: 10 TABLET ORAL at 17:58

## 2023-04-05 RX ADMIN — ACETAMINOPHEN 975 MG: 325 TABLET, FILM COATED ORAL at 15:26

## 2023-04-05 RX ADMIN — WARFARIN SODIUM 5 MG: 5 TABLET ORAL at 17:54

## 2023-04-05 RX ADMIN — EMPAGLIFLOZIN 10 MG: 10 TABLET, FILM COATED ORAL at 07:54

## 2023-04-05 RX ADMIN — GABAPENTIN 300 MG: 300 CAPSULE ORAL at 20:03

## 2023-04-05 RX ADMIN — SPIRONOLACTONE 12.5 MG: 25 TABLET, FILM COATED ORAL at 07:55

## 2023-04-05 RX ADMIN — OXYCODONE HYDROCHLORIDE 10 MG: 10 TABLET ORAL at 06:36

## 2023-04-05 RX ADMIN — OXYCODONE HYDROCHLORIDE 10 MG: 10 TABLET ORAL at 12:52

## 2023-04-05 RX ADMIN — ACETAMINOPHEN 975 MG: 325 TABLET, FILM COATED ORAL at 00:18

## 2023-04-05 RX ADMIN — OXYCODONE HYDROCHLORIDE 10 MG: 10 TABLET ORAL at 02:20

## 2023-04-05 ASSESSMENT — ACTIVITIES OF DAILY LIVING (ADL)
ADLS_ACUITY_SCORE: 20

## 2023-04-05 NOTE — PLAN OF CARE
"Goal Outcome Evaluation:      BP (!) 78/40 (BP Location: Right arm)   Pulse 76   Temp 97.9  F (36.6  C) (Oral)   Resp 19   Ht 1.702 m (5' 7\")   Wt 78.1 kg (172 lb 2.9 oz)   SpO2 98%   BMI 26.97 kg/m           Neuro: A&Ox4.   Cardiac: SR. Heart Mate 3 Lvad  so pt Map are above 65 today.  Respiratory: Sating 94%>on RA.  GI/: Adequate urine output. BM X1  Diet/appetite: Tolerating regular diet with 2 L FR. Eating well.  Activity:  Independent/SBA, up to chair and in halls.  Pain: At acceptable level on current regimen.   Skin: No new deficits noted.  LDA's:one PIV     Plan: Continue with POC. Notify primary team with changes.           "

## 2023-04-05 NOTE — PROGRESS NOTES
[facility]    Cardiology Consult Progress Note- Cardiology      Date of Admission:  3/15/2023     Assessment & Plan: HVSL   57 year old man with past medical history of HFrEF (EF 10% 5/2022) 2/2 NICM s/p ICD, tobacco use disorder, marijuana use, HTN, lumbar radiculopathy, CKD who presents after being found down at home. Found to have had a prolonged episode of VT with concern for cardiac arrest s/p ROSC in the field now s/p LVAD with HM3 on 03/23/23 and tricuspid annuloplasty.     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 Presented with out of hospital cardiac arrest with VT for 5 min 43 seconds of VT at 199 bpm, with spontaneous conversion to sinus rhythm without defibrillation on device interrogation on 3/15/2023. INR still subtherapeutic (goal of 2-3).    - VT secondary prevention ICD in place  - Volume status: hypervolemic. 2 lbs up from dry weight (170 lbs). Additional IV Bumex 3mg today w/ hope to switch to oral Bumex tomorrow  .- BB: Hold PTA carvedilol 12.5 mg BID   - ACEi/ARB/ARNI: Lisinopril 2.5mg  - MRA: continue spironolactone 12.5 mg daily  - SGLT2i: continue Empagliflozin   - SCD ppx/BiV pacer: ICD, reprogrammed device to provide ATP burst at VT threshold  - Continue warfarin per pharmacy dosing  - Continue heparin bridge until at therapeutic INR 2-3  - Continue digoxin 250 mcg daily  - Amiodarone 200 mg daily       Patient staffed w/ Dr. Joanna Leon, DO  Internal Medicine PGY1  [facility]  [facility]  ______________________________________________________________________    Interval History   Care team notes reviewed. No acute events overnight. Patient unhappy this morning. Says he is exhausted and hasn't slept well since being admitted to the hospital. Was hoping to be discharged Thursday but is now being told he'll have to stay longer, which he is very disappointed with. Is unhappy that so many dental students have come in to examine him and that PT comes every day. Denies any new shortness of breath or chest pain.     Physical Exam   Vital Signs: Temp: 97.9  F (36.6  C) Temp src: Oral BP: (!) 78/72 Pulse: 79   Resp: 17 SpO2: 97 % O2 Device: None (Room air)    Weight: 172 lbs 2.87 oz     MAPs in 70s, up to 80 overnight.     LVAD Parameters:  Heartmate 3 LEFT VS  Flow (Lpm): 4.5 Lpm  Pulse Index (PI): 3-4 overnight   Speed (rpm): 5200 rpm  Power (mari): 3.7 mari  No alarms   Current settin    I/Os:  Net negative -180 mL  but had two unmeasured UOP episodes so I/Os not reliable     Vitals:    23 0426 23 0552 23 0000   Weight: 79.8 kg (175 lb 14.8 oz) 79 kg (174 lb 2.6 oz) 78.1 kg (172 lb 2.9 oz)     EDW: ~165-170 lbs     Constitutional: awake, alert, cooperative, no apparent distress, and appears stated age  Eyes: sclera clear, conjunctiva normal  Respiratory: No increased work of breathing, good air exchange, no wheezing  Cardiovascular: LVAD hum.  JVP to 12 cm H20  GI: soft, non-distended, non-tender, no masses palpated  Skin: no bruising or bleeding  Extremities: Minimal, non pitting edema in lower extremities   Neurologic: Awake, alert, interactive       Current medication list:    acetaminophen  975 mg Oral Q8H HOWIE     amiodarone  200 mg Oral Daily     aspirin  81 mg Oral or NG Tube Daily     digoxin  250 mcg Oral Daily     empagliflozin  10 mg Oral Daily     gabapentin  300 mg Oral or Feeding Tube TID     lidocaine  1 patch Transdermal Q24H     lidocaine    Transdermal Q8H HOWIE     lisinopril  2.5 mg Oral Daily     magnesium oxide  400 mg Oral Q4H     pantoprazole  40 mg Oral QAM AC     polyethylene glycol  17 g Oral BID     senna-docusate  2 tablet Oral BID     sodium chloride (PF)  3 mL Intracatheter Q8H     spironolactone  12.5 mg Oral Daily     Warfarin Therapy Reminder  1 each Oral See Admin Instructions       Recent labs:  Na 133, K 4.1, Mg 1.9, Cr 0.92 (1.04)  INR 1.70 (1.63)     Imaging: CT chest 4/3 w/ small left pleural effusion w/ adjacent compressive atelectasis

## 2023-04-05 NOTE — PROGRESS NOTES
Cardiovascular Surgery Progress Note  04/05/2023         Assessment and Plan:     57 year old male with PMH of HFrEF (10%) secondary to NICM (suspected viral etiology) s/p ICD, chronic tobacco/marijuana use, COPD, HTN, CKD stage III, who presented to [facility] on 3/15 after being found down from a VT arrest. CPR was performed by family. Spontaneously converted to NSR without defibrillation. Down time suspected to be ~ 6 minutes. Presents to [facility] for LVAD placement with preop IABP by Dr. Matias on 3/23/23. He is now s/p LVAD (Heartmate III) placement, TVR, and PFO repair with Dr. Matias and Dr. Waller 3/23/22.    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 RV is mildly dilated and RV function is probably normal on milrinone with moderate to severe TR (there is reversal of hepatic venous flow). There is a swan in place which may be inhibiting coaptation. Trace AI. Mild MR. No MS. There is a continuous left to right shunt across a PFO.  Post-bypass JOAN: No AI. The aortic valve opens 1:3 cardiac cycles. No residual PFO. Atrial septum bulges into the left atrium. TR ring, mild to moderate TR. RV function mildly reduced on epi/norepi/vaso nitric. Inflow cannula is well positioned with low velocities. There is no visible aortic dissection.    HD stable. MAPS 70s. No flow events overnight.   - ASA 81 mg daily  - BB not indicated  - lisinopril 2.5 mg daily  - digoxin 250 mcg daily  - spironolactone 12.5 mg daily  - Diuresis as below (per cardiology)    Atrial fibrillation with RVR 3/28, now NSR  - amiodarone infusion 0.5 mg/h started 3/28  per cards, ongoing afib RVR so amiodarone increased to 1 mg/h  - transitioned to PO amiodarone 400 mg BID 3/29, down to 200 mg daily 4/2    Pulmonary:  Extubated POD 1 to 4 lpm via NC. Now saturating well on room air.   - Pulm hygiene, IS, activity and deep breathing    Neurology / MSK:  Acute post-operative pain   Pain well controlled with current regimen:  - Scheduled: Tylenol   - PRN: PO Oxycodone PRN. Refusing methocarbamol as it gives him weakness     / Renal / Fluid / Electrolytes:  Hyponatremia, improving, likely due to volume overload -- diuresis as below  Most recent creatinine 1.04; adequate UOP.   FLUID STATUS: Pre-op weight 172 lb, weight today 172 lb; I/O past 24 hours: -180 mL  - Diuresis per cardiology -- intermittent IV Bumex  - Replete lytes per protocol  - Strict I/O, daily weights  - Avoid/limit nephrotoxins as able  Urinary retention post-op, resolved  - remove muro 3/30    GI / Nutrition:   Elevated LFTs, resolved   - Tolerating regular diet  - Continue bowel regimen, last BM 4/4    Endocrine:  Stress induced hyperglycemia   Hgb A1c 5.6  - Initially managed on insulin drip postop, transitioned to medium sliding scale; goal BG <180 for optimal healing    Infectious Disease:  Stress induced leukocytosis  Concern for pneumonia  WBC 17.3 (trending up slightly from 16 yesterday), remains afebrile  - CRP elevated, procal elevated to 0.54 --> 0.49, repeat CRP 4/4 improved to 61.2 (from 132.0)  - CXR with left consolidation concerning for pneumonia and patient complains of cough   - UA, blood cultures, and sputum sent 3/29 -- UA neg, blood cultures all NGTD  - started empiric IV vancomycin and Zosyn for likely HAP versus alternate post-operative infection. Discontinue vanco given negative MRSA nare  - 4/2 send MRSA nares, negative  - CT chest 4/3 for persistent leukocytosis -- no infectious source identified   - ID consult for persistent leukocytosis, 4/3 -- recommended stopping IV Zosyn and monitor,  if fever spike or up-trending WBC/CRP consider dental source of infection   - re-consulted ID 4/5 for up-trending white count -- recommend continuing to monitor, hold off on re-starting antibiotics for now. If WBC/CRP continues to up-trend, consider dental source or thoracentesis per ID  - Completed perioperative antibiotics  - Continue to monitor fever curve, CBC    Hematology:   Acute blood loss anemia and thrombocytopenia  Hgb stable; Plt stable, no signs or symptoms of active bleeding  - CBC daily    Anticoagulation:   - ASA 81 mg  - Coumadin for LVAD with a goal INR of 2-3, INR sub-therapeutic this AM -- re-start heparin gtt per discussion with Dr. Matias    MSK/Skin:  Sternotomy  Surgical incision  - Sternal precautions  - Incisional cares per protocol    Prophylaxis:   - Stress ulcer prophylaxis: Pantoprazole 40 mg daily  - DVT prophylaxis: warfarin, SCD    Disposition:   - Transferred to  on 3/26  - Therapies recommending discharge to home when medically ready. Barriers to discharge include infection monitoring and INR, hopefully in the next 2-3 days    Discussed with Dr. Florencio Crowe PA-C on 4/5/2023 at 2:48 PM          Interval History:     No acute events overnight. States pain is well managed on current regimen, slept better. Ongoing pain to left ribs since CPR pre-op, improving. Breathing is stable on room air, working with IS. Tolerating diet, is passing flatus, +BM, no n/v. Denies chest pain, palpitations, dizziness, syncopal symptoms, chills, myalgias, sternal popping/clicking. Still having rib soreness.         Physical Exam:     Gen: A&Ox4, NAD  Neuro: Intact no focal deficits   CV: LVAD hum  Pulm: CTA, no wheezing or rhonchi, normal breathing on RA  Abd: nondistended, normal BS, soft, nontender  Ext: trace peripheral edema  Incision: clean, dry, intact, no erythema, sternum stable  Tubes/drain sites: dressing clean and dry  LVAD dressing clean, dry, intact.   Speed: 5250, Power: 3.7,  Flow: 4.2, PI: 3.7         Data:    Imaging:  reviewed recent imaging, no acute concerns    No results found for this or any previous visit (from the past 24 hour(s)).     Labs:  Recent Labs   Lab 04/05/23  0750 04/05/23  0639 04/05/23  0414 04/04/23  1721 04/04/23  0642 04/04/23  0532 04/03/23  0730 04/03/23  0442 04/02/23  0806 04/02/23  0409   WBC 17.3*  --  15.3*  --   --  16.0*  --  18.8*  --  17.0*   HGB 11.0*  --  10.0*  --   --  9.7*  --  9.7*  --  9.8*   MCV 97  --  98  --   --  96  --  96  --  96   *  --  626*  --   --  542*  --  546*  --  488*   INR  --   --  1.70*  --   --  1.63*  --  1.68*  --  2.00*   NA  --   --  133*  --   --  134*  --  131*  131*   < > 133*  133*   POTASSIUM  --   --  4.1  --   --  3.8  --  4.3  --  3.9   CHLORIDE  --   --  95*  --   --  94*  --  93*  --  95*   CO2  --   --  30*  --   --  28  --  29  --  29   BUN  --   --  9.0  --   --  9.9  --  10.4  --  11.9   CR  --   --  0.92  --   --  1.04  --  1.16  --  1.35*   ANIONGAP  --   --  8  --   --  12  --  9  --  9   TONY  --   --  8.6  --   --  8.3*  --  8.3*  --  8.3*   GLC  --  91 87 139*   < > 96   < > 94   < > 84   ALBUMIN  --   --   --   --   --   --   --  2.6*  --  2.7*   PROTTOTAL  --   --   --   --   --   --   --  5.0*  --  5.1*   BILITOTAL  --   --   --   --   --   --   --  0.7  --  0.7   ALKPHOS  --   --   --   --   --   --   --  92  --  96   ALT  --   --   --   --   --   --   --  26  --  25   AST  --   --   --   --   --   --   --  36  --  40    < > = values in this interval not displayed.      GLUCOSE:   Recent Labs   Lab 04/05/23  0639 04/05/23  0414 04/04/23  1721 04/04/23  0642 04/04/23  0532 04/03/23  1537   GLC 91 87 139* 89 96 102*

## 2023-04-05 NOTE — PLAN OF CARE
Neuro: A&Ox4. Calls as needed  Cardiac: SR. VSS. LVAD HM3. Doppler MAPs in the 70s  Respiratory: Sating above 94 on RA. Occasionally asks for 2L NC for comfort  GI/: Adequate urine output. No BM  Diet/appetite: Tolerating reg diet with 2L FR. Eating well.  Activity:  Independent/SBA to bathroom and chair  Pain: At acceptable level on current regimen. Oxycodone x2, dilaudid x2 for rib pain, mostly with activity  Skin: No new deficits noted.  LDA's: L PIV SL    Plan: LVAD test with family at 10am. Discharge Thursday. Continue with POC. Notify primary team with changes.

## 2023-04-06 LAB
ANION GAP SERPL CALCULATED.3IONS-SCNC: 10 MMOL/L (ref 7–15)
ANION GAP SERPL CALCULATED.3IONS-SCNC: 8 MMOL/L (ref 7–15)
BUN SERPL-MCNC: 11 MG/DL (ref 6–20)
BUN SERPL-MCNC: 11.1 MG/DL (ref 6–20)
CALCIUM SERPL-MCNC: 8.6 MG/DL (ref 8.6–10)
CALCIUM SERPL-MCNC: 8.9 MG/DL (ref 8.6–10)
CHLORIDE SERPL-SCNC: 94 MMOL/L (ref 98–107)
CHLORIDE SERPL-SCNC: 96 MMOL/L (ref 98–107)
CLOT INIT KAOL IND TO POST HEP NEUT TRTO: 0.9 {RATIO}
CLOT INIT KAOL IND TO POST HEP NEUT TRTO: 1 {RATIO}
CLOT INIT KAOLIN IND BLD US: 134 SEC (ref 113–166)
CLOT INIT KAOLIN IND BLD US: 146 SEC (ref 113–166)
CLOT INIT KAOLIN IND P HEP NEUT BLD US: 131 SEC (ref 103–153)
CLOT INIT KAOLIN IND P HEP NEUT BLD US: 159 SEC (ref 103–153)
CLOT STIFF PLT CONT BLD CALC: 15.3 HPA (ref 11.9–29.8)
CLOT STIFF PLT CONT BLD CALC: 9.6 HPA (ref 11.9–29.8)
CLOT STIFF TF IND P HEP NEUT BLD US: 11.3 HPA (ref 13–33.2)
CLOT STIFF TF IND P HEP NEUT BLD US: 17.2 HPA (ref 13–33.2)
CLOT STIFF TF IND+IIB-IIIA INH P HEP NEU: 1.7 HPA (ref 1–3.7)
CLOT STIFF TF IND+IIB-IIIA INH P HEP NEU: 1.9 HPA (ref 1–3.7)
CREAT SERPL-MCNC: 0.83 MG/DL (ref 0.67–1.17)
CREAT SERPL-MCNC: 0.84 MG/DL (ref 0.67–1.17)
CRP SERPL-MCNC: 60.6 MG/L
DEPRECATED HCO3 PLAS-SCNC: 29 MMOL/L (ref 22–29)
DEPRECATED HCO3 PLAS-SCNC: 30 MMOL/L (ref 22–29)
ERYTHROCYTE [DISTWIDTH] IN BLOOD BY AUTOMATED COUNT: 15 % (ref 10–15)
GFR SERPL CREATININE-BSD FRML MDRD: >90 ML/MIN/1.73M2
GFR SERPL CREATININE-BSD FRML MDRD: >90 ML/MIN/1.73M2
GLUCOSE BLDC GLUCOMTR-MCNC: 98 MG/DL (ref 70–99)
GLUCOSE SERPL-MCNC: 103 MG/DL (ref 70–99)
GLUCOSE SERPL-MCNC: 104 MG/DL (ref 70–99)
HCT VFR BLD AUTO: 33.6 % (ref 40–53)
HGB BLD-MCNC: 10.1 G/DL (ref 13.3–17.7)
HOLD SPECIMEN: NORMAL
INR PPP: 1.75 (ref 0.85–1.15)
MAGNESIUM SERPL-MCNC: 1.9 MG/DL (ref 1.7–2.3)
MCH RBC QN AUTO: 29.4 PG (ref 26.5–33)
MCHC RBC AUTO-ENTMCNC: 30.1 G/DL (ref 31.5–36.5)
MCV RBC AUTO: 98 FL (ref 78–100)
PHOSPHATE SERPL-MCNC: 3.3 MG/DL (ref 2.5–4.5)
PLATELET # BLD AUTO: 655 10E3/UL (ref 150–450)
POTASSIUM SERPL-SCNC: 4 MMOL/L (ref 3.4–5.3)
POTASSIUM SERPL-SCNC: 4.2 MMOL/L (ref 3.4–5.3)
RBC # BLD AUTO: 3.43 10E6/UL (ref 4.4–5.9)
SODIUM SERPL-SCNC: 133 MMOL/L (ref 136–145)
SODIUM SERPL-SCNC: 134 MMOL/L (ref 136–145)
UFH PPP CHRO-ACNC: 0.19 IU/ML
UFH PPP CHRO-ACNC: 0.23 IU/ML
UFH PPP CHRO-ACNC: 0.3 IU/ML
WBC # BLD AUTO: 15.6 10E3/UL (ref 4–11)

## 2023-04-06 PROCEDURE — 85610 PROTHROMBIN TIME: CPT | Performed by: STUDENT IN AN ORGANIZED HEALTH CARE EDUCATION/TRAINING PROGRAM

## 2023-04-06 PROCEDURE — 86140 C-REACTIVE PROTEIN: CPT | Performed by: PHYSICIAN ASSISTANT

## 2023-04-06 PROCEDURE — 250N000013 HC RX MED GY IP 250 OP 250 PS 637: Performed by: PHYSICIAN ASSISTANT

## 2023-04-06 PROCEDURE — 250N000013 HC RX MED GY IP 250 OP 250 PS 637: Performed by: INTERNAL MEDICINE

## 2023-04-06 PROCEDURE — 36415 COLL VENOUS BLD VENIPUNCTURE: CPT

## 2023-04-06 PROCEDURE — 85027 COMPLETE CBC AUTOMATED: CPT | Performed by: PHYSICIAN ASSISTANT

## 2023-04-06 PROCEDURE — 250N000013 HC RX MED GY IP 250 OP 250 PS 637: Performed by: STUDENT IN AN ORGANIZED HEALTH CARE EDUCATION/TRAINING PROGRAM

## 2023-04-06 PROCEDURE — 250N000013 HC RX MED GY IP 250 OP 250 PS 637

## 2023-04-06 PROCEDURE — 85520 HEPARIN ASSAY: CPT | Performed by: INTERNAL MEDICINE

## 2023-04-06 PROCEDURE — 83735 ASSAY OF MAGNESIUM: CPT | Performed by: INTERNAL MEDICINE

## 2023-04-06 PROCEDURE — 80048 BASIC METABOLIC PNL TOTAL CA: CPT

## 2023-04-06 PROCEDURE — 36415 COLL VENOUS BLD VENIPUNCTURE: CPT | Performed by: INTERNAL MEDICINE

## 2023-04-06 PROCEDURE — 120N000003 HC R&B IMCU UMMC

## 2023-04-06 PROCEDURE — 84100 ASSAY OF PHOSPHORUS: CPT | Performed by: INTERNAL MEDICINE

## 2023-04-06 PROCEDURE — 99233 SBSQ HOSP IP/OBS HIGH 50: CPT | Mod: 25 | Performed by: INTERNAL MEDICINE

## 2023-04-06 PROCEDURE — 93750 INTERROGATION VAD IN PERSON: CPT | Performed by: INTERNAL MEDICINE

## 2023-04-06 PROCEDURE — 250N000011 HC RX IP 250 OP 636: Performed by: PHYSICIAN ASSISTANT

## 2023-04-06 RX ORDER — WARFARIN SODIUM 5 MG/1
5 TABLET ORAL
Status: COMPLETED | OUTPATIENT
Start: 2023-04-06 | End: 2023-04-06

## 2023-04-06 RX ORDER — MAGNESIUM OXIDE 400 MG/1
400 TABLET ORAL EVERY 4 HOURS
Status: COMPLETED | OUTPATIENT
Start: 2023-04-06 | End: 2023-04-06

## 2023-04-06 RX ORDER — METHOCARBAMOL 500 MG/1
500 TABLET, FILM COATED ORAL 4 TIMES DAILY
Status: DISCONTINUED | OUTPATIENT
Start: 2023-04-06 | End: 2023-04-09 | Stop reason: HOSPADM

## 2023-04-06 RX ORDER — BUMETANIDE 1 MG/1
1 TABLET ORAL DAILY
Status: DISCONTINUED | OUTPATIENT
Start: 2023-04-06 | End: 2023-04-09 | Stop reason: HOSPADM

## 2023-04-06 RX ADMIN — PANTOPRAZOLE SODIUM 40 MG: 40 TABLET, DELAYED RELEASE ORAL at 07:52

## 2023-04-06 RX ADMIN — SPIRONOLACTONE 12.5 MG: 25 TABLET, FILM COATED ORAL at 07:53

## 2023-04-06 RX ADMIN — GABAPENTIN 300 MG: 300 CAPSULE ORAL at 20:00

## 2023-04-06 RX ADMIN — EMPAGLIFLOZIN 10 MG: 10 TABLET, FILM COATED ORAL at 07:53

## 2023-04-06 RX ADMIN — OXYCODONE HYDROCHLORIDE 10 MG: 10 TABLET ORAL at 06:34

## 2023-04-06 RX ADMIN — OXYCODONE HYDROCHLORIDE 10 MG: 10 TABLET ORAL at 12:43

## 2023-04-06 RX ADMIN — OXYCODONE HYDROCHLORIDE 10 MG: 10 TABLET ORAL at 18:00

## 2023-04-06 RX ADMIN — BUMETANIDE 1 MG: 1 TABLET ORAL at 14:25

## 2023-04-06 RX ADMIN — LIDOCAINE PATCH 4% 1 PATCH: 40 PATCH TOPICAL at 07:53

## 2023-04-06 RX ADMIN — HEPARIN SODIUM AND DEXTROSE 1400 UNITS/HR: 10000; 5 INJECTION INTRAVENOUS at 02:57

## 2023-04-06 RX ADMIN — ACETAMINOPHEN 975 MG: 325 TABLET, FILM COATED ORAL at 07:52

## 2023-04-06 RX ADMIN — AMIODARONE HYDROCHLORIDE 200 MG: 200 TABLET ORAL at 07:59

## 2023-04-06 RX ADMIN — MAGNESIUM OXIDE TAB 400 MG (241.3 MG ELEMENTAL MG) 400 MG: 400 (241.3 MG) TAB at 07:53

## 2023-04-06 RX ADMIN — ACETAMINOPHEN 975 MG: 325 TABLET, FILM COATED ORAL at 23:54

## 2023-04-06 RX ADMIN — DIGOXIN 250 MCG: 125 TABLET ORAL at 07:59

## 2023-04-06 RX ADMIN — ACETAMINOPHEN 975 MG: 325 TABLET, FILM COATED ORAL at 17:00

## 2023-04-06 RX ADMIN — OXYCODONE HYDROCHLORIDE 10 MG: 10 TABLET ORAL at 22:01

## 2023-04-06 RX ADMIN — HEPARIN SODIUM AND DEXTROSE 1550 UNITS/HR: 10000; 5 INJECTION INTRAVENOUS at 20:03

## 2023-04-06 RX ADMIN — ASPIRIN 81 MG 81 MG: 81 TABLET ORAL at 07:53

## 2023-04-06 RX ADMIN — GABAPENTIN 300 MG: 300 CAPSULE ORAL at 07:52

## 2023-04-06 RX ADMIN — MAGNESIUM OXIDE TAB 400 MG (241.3 MG ELEMENTAL MG) 400 MG: 400 (241.3 MG) TAB at 12:43

## 2023-04-06 RX ADMIN — LISINOPRIL 2.5 MG: 2.5 TABLET ORAL at 07:58

## 2023-04-06 RX ADMIN — OXYCODONE HYDROCHLORIDE 10 MG: 10 TABLET ORAL at 02:04

## 2023-04-06 RX ADMIN — DICLOFENAC SODIUM 4 G: 10 GEL TOPICAL at 20:04

## 2023-04-06 RX ADMIN — SENNOSIDES AND DOCUSATE SODIUM 2 TABLET: 8.6; 5 TABLET ORAL at 07:52

## 2023-04-06 RX ADMIN — WARFARIN SODIUM 5 MG: 5 TABLET ORAL at 17:53

## 2023-04-06 RX ADMIN — GABAPENTIN 300 MG: 300 CAPSULE ORAL at 14:25

## 2023-04-06 ASSESSMENT — ACTIVITIES OF DAILY LIVING (ADL)
ADLS_ACUITY_SCORE: 20

## 2023-04-06 NOTE — PROGRESS NOTES
LVAD Social Work Service Discharge Note      Patient Name:  Akshat Fragoso     Anticipated Discharge Date:  4/7-4/8    Discharge Disposition:   Home with family to home in [place].    Plan for 24 hour care for immediate post LVAD period: Wife and son will provide 24-hour caregiver support at home    If not local, plans for short term stay:  They live locally    Additional Services/Equipment Arranged:  None-Family already brought VAD equipment home and are finishing VAD education-test needs to be reviewed/corrected.     Persons notified of above discharge plan:  Patient/wife and Son    Patient / Family response to discharge plan:  Agreeable to discharge. Patient is really anxious about discharge and wants it to happen soon. Frustrated that he can't discharge home today.    Education and resources provided by SW at discharge: role of VAD  in out patient setting and provided contact info for , availability of support groups, and caregiver requirements, community resources      Plan: SW has been following Akshat and family since admission for evaluation of LVAD. Patient and family have been connected to health psychology and hopes to continue that outpatient. He has adequate coping skills and good support from family.    Akshat and wife have interest in support groups and information about how to join. They have been connected to other VAD patients and Akshat expressed appreciating this opportunity. Both patient and wife aware of how to reach writer with psychosocial questions/concerns outpatient. Hoping to discharge home tomorrow as long as INR therapeutic.

## 2023-04-06 NOTE — PROGRESS NOTES
Cardiovascular Surgery Progress Note  04/06/2023         Assessment and Plan:     57 year old male with PMH of HFrEF (10%) secondary to NICM (suspected viral etiology) s/p ICD, chronic tobacco/marijuana use, COPD, HTN, CKD stage III, who presented to [facility] on 3/15 after being found down from a VT arrest. CPR was performed by family. Spontaneously converted to NSR without defibrillation. Down time suspected to be ~ 6 minutes. Presents to [facility] for LVAD placement with preop IABP by Dr. Matias on 3/23/23. He is now s/p LVAD (Heartmate III) placement, TVR, and PFO repair with Dr. Matias and Dr. Waller 3/23/22.    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 RV is mildly dilated and RV function is probably normal on milrinone with moderate to severe TR (there is reversal of hepatic venous flow). There is a swan in place which may be inhibiting coaptation. Trace AI. Mild MR. No MS. There is a continuous left to right shunt across a PFO.  Post-bypass JOAN: No AI. The aortic valve opens 1:3 cardiac cycles. No residual PFO. Atrial septum bulges into the left atrium. TR ring, mild to moderate TR. RV function mildly reduced on epi/norepi/vaso nitric. Inflow cannula is well positioned with low velocities. There is no visible aortic dissection.    HD stable. MAPS 70s. No flow events overnight.   - ASA 81 mg daily  - BB not indicated  - lisinopril 2.5 mg daily  - digoxin 250 mcg daily  - spironolactone 12.5 mg daily  - Diuresis as below (per cardiology)    Atrial fibrillation with RVR 3/28, now NSR  - amiodarone infusion 0.5 mg/h started 3/28  per cards, ongoing afib RVR so amiodarone increased to 1 mg/h  - transitioned to PO amiodarone 400 mg BID 3/29, down to 200 mg daily 4/2    Pulmonary:  Extubated POD 1 to 4 lpm via NC. Now saturating well on room air.   - Pulm hygiene, IS, activity and deep breathing    Neurology / MSK:  Acute post-operative pain   Pain well controlled with current regimen:  - Scheduled: Tylenol   - PRN: PO Oxycodone PRN. Methocarbamol added (initially was refusing due to weakness), diclofenac PRN added for ongoing rib/MSK pain, recommend ice/heat, lido patches      / Renal / Fluid / Electrolytes:  Hyponatremia, improving, likely due to volume overload -- diuresis as below  Most recent creatinine 0.84; adequate UOP.   FLUID STATUS: Pre-op weight 172 lb, weight today 170 lb; I/O past 24 hours: -2 L  - Diuresis per cardiology -- intermittent IV Bumex  - Replete lytes per protocol  - Strict I/O, daily weights  - Avoid/limit nephrotoxins as able  Urinary retention post-op, resolved  - remove muro 3/30    GI / Nutrition:   Elevated LFTs, resolved   - Tolerating regular diet  - Continue bowel regimen, last BM 4/6    Endocrine:  Stress induced hyperglycemia   Hgb A1c 5.6  - Initially managed on insulin drip postop, transitioned to medium sliding scale; goal BG <180 for optimal healing    Infectious Disease:  Stress induced leukocytosis  Concern for pneumonia  WBC 15.6 (improving), remains afebrile  - CRP elevated, procal elevated to 0.54 --> 0.49, repeat CRP 4/4 improved to 61.2 (from 132.0), CRP continues to improve   - CXR with left consolidation concerning for pneumonia and patient complains of cough   - UA, blood cultures, and sputum sent 3/29 -- UA neg, blood cultures all NGTD  - started empiric IV vancomycin and Zosyn for likely HAP versus alternate post-operative infection. Discontinue vanco given negative MRSA nare  - CT chest 4/3 for persistent leukocytosis -- no infectious source identified   - ID consult for persistent  leukocytosis, 4/3 -- recommended stopping IV Zosyn and monitor, if fever spike or up-trending WBC/CRP consider dental source of infection   - re-consulted ID 4/5 for up-trending white count -- recommend continuing to monitor, hold off on re-starting antibiotics for now. If WBC/CRP continues to up-trend, consider dental source or thoracentesis per ID  - Completed perioperative antibiotics  - Continue to monitor fever curve, CBC    Hematology:   Acute blood loss anemia and thrombocytopenia  Hgb stable; Plt stable, no signs or symptoms of active bleeding  - CBC daily    Anticoagulation:   - ASA 81 mg  - Coumadin for LVAD with a goal INR of 2-3, INR remains sub-therapeutic this AM -- re-start heparin gtt per discussion with Dr. Matias    MSK/Skin:  Sternotomy  Surgical incision  - Sternal precautions  - Incisional cares per protocol    Prophylaxis:   - Stress ulcer prophylaxis: Pantoprazole 40 mg daily  - DVT prophylaxis: warfarin, SCD    Disposition:   - Transferred to  on 3/26  - Therapies recommending discharge to home when medically ready. Barriers to discharge include infection monitoring and INR, hopefully in the next 2-3 days    Discussed with Dr. Florencio Crowe PA-C on 4/6/2023 at 3:22 PM          Interval History:     No acute events overnight. States pain is well managed on current regimen, slept better. Ongoing pain to left ribs since CPR pre-op, improving. Breathing is stable on room air, working with IS. Tolerating diet, is passing flatus, +BM, no n/v. Denies chest pain, palpitations, dizziness, syncopal symptoms, chills, myalgias, sternal popping/clicking. Still having rib soreness.         Physical Exam:     Gen: A&Ox4, NAD  Neuro: Intact no focal deficits   CV: LVAD hum  Pulm: CTA, no wheezing or rhonchi, normal breathing on RA  Abd: nondistended, normal BS, soft, nontender  Ext: trace peripheral edema  Incision: clean, dry, intact, no erythema, sternum stable  Tubes/drain sites: dressing  clean and dry  LVAD dressing clean, dry, intact.   Speed: 5200, Power: 3.7, Flow: 4.6, PI: 3.2         Data:    Imaging:  reviewed recent imaging, no acute concerns    No results found for this or any previous visit (from the past 24 hour(s)).     Labs:  Recent Labs   Lab 04/06/23  0716 04/06/23  0405 04/05/23  1458 04/05/23  0750 04/05/23  0639 04/05/23  0414 04/04/23  0642 04/04/23  0532 04/03/23  0730 04/03/23  0442 04/02/23  0806 04/02/23  0409   WBC  --   --   --  17.3*  --  15.3*  --  16.0*  --  18.8*  --  17.0*   HGB  --   --   --  11.0*  --  10.0*  --  9.7*  --  9.7*  --  9.8*   MCV  --   --   --  97  --  98  --  96  --  96  --  96   PLT  --   --   --  685*  --  626*  --  542*  --  546*  --  488*   INR  --  1.75*  --   --   --  1.70*  --  1.63*  --  1.68*  --  2.00*   NA  --  133* 135*  --   --  133*  --  134*  --  131*  131*   < > 133*  133*   POTASSIUM  --  4.0 4.2  --   --  4.1  --  3.8  --  4.3  --  3.9   CHLORIDE  --  94* 96*  --   --  95*  --  94*  --  93*  --  95*   CO2  --  29 27  --   --  30*  --  28  --  29  --  29   BUN  --  11.0 10.9  --   --  9.0  --  9.9  --  10.4  --  11.9   CR  --  0.83 0.89  --   --  0.92  --  1.04  --  1.16  --  1.35*   ANIONGAP  --  10 12  --   --  8  --  12  --  9  --  9   TONY  --  8.6 8.7  --   --  8.6  --  8.3*  --  8.3*  --  8.3*   GLC 98 104* 119*  --    < > 87   < > 96   < > 94   < > 84   ALBUMIN  --   --   --   --   --   --   --   --   --  2.6*  --  2.7*   PROTTOTAL  --   --   --   --   --   --   --   --   --  5.0*  --  5.1*   BILITOTAL  --   --   --   --   --   --   --   --   --  0.7  --  0.7   ALKPHOS  --   --   --   --   --   --   --   --   --  92  --  96   ALT  --   --   --   --   --   --   --   --   --  26  --  25   AST  --   --   --   --   --   --   --   --   --  36  --  40    < > = values in this interval not displayed.      GLUCOSE:   Recent Labs   Lab 04/06/23  0716 04/06/23  0405 04/05/23  1458 04/05/23  0639 04/05/23  0414 04/04/23  1721   GLC 98 104*  119* 91 87 139*

## 2023-04-06 NOTE — PROGRESS NOTES
There is still a small amount of serosanguinous drainage from the DLES. The insertion site looks a little more red today than past days. The suture could be causing the on-going drainage. Akshat's WBC continues to be elevated. There is some skin irritation near the anchor. Dr Marshall, ID, and the CVTS team notified.

## 2023-04-06 NOTE — PROGRESS NOTES
Akshat, Landry and Oneil are taking the VAD test tonight. It will be reviewed tomorrow with any missed content being reinforced. Also on Friday, Landry or Oneil will do the dressing change again.    Akshat will be cleared for discharge from a VAD education perspective around 3pm Friday, 4/7/23.

## 2023-04-06 NOTE — PROGRESS NOTES
[facility] [place]    Cardiology Consult Progress Note- Cardiology      Date of Admission:  3/15/2023     Assessment & Plan: HVSL   57 year old man with past medical history of HFrEF (EF 10% 5/2022) 2/2 NICM s/p ICD, tobacco use disorder, marijuana use, HTN, lumbar radiculopathy, CKD who presents after being found down at home. Found to have had a prolonged episode of VT with concern for cardiac arrest s/p ROSC in the field now s/p LVAD with HM3 on 03/23/23 and tricuspid annuloplasty.     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 Presented with out of hospital cardiac arrest with VT for 5 min 43 seconds of VT at 199 bpm, with spontaneous conversion to sinus rhythm without defibrillation on device interrogation on 3/15/2023. INR still subtherapeutic (goal of 2-3).    - VT secondary prevention ICD in place  - Volume status: Nearing euvolemia. Transition from IV to PO dosing today: 1 mg Bumex PO daily  .- BB: Hold PTA carvedilol 12.5 mg BID   - ACEi/ARB/ARNI: Lisinopril 2.5mg  - MRA: continue spironolactone 12.5 mg daily  - SGLT2i: continue Empagliflozin 10 mg daily   - SCD ppx/BiV pacer: ICD, reprogrammed device to provide ATP burst at VT threshold  - Continue warfarin per pharmacy dosing  - Continue heparin bridge until at therapeutic INR 2-3  - Continue digoxin 250 mcg daily  - Amiodarone 200 mg daily       Patient staffed w/ Dr. Joanna Leon, DO  Internal Medicine PGY1  [facility]  [place]  ______________________________________________________________________    Interval History   Care team notes reviewed. No acute events overnight. Still feeling fatigued from lack of sleep. No new chest pains, palpitations, or shortness of breath.     Physical Exam   Vital Signs: Temp: 98.3  F (36.8  C) Temp src: Oral BP: (!) 78/40 Pulse: 77   Resp: 17 SpO2: 99 % O2 Device: None (Room air)    Weight: 170 lbs 6.65 oz     MAPs in the 80s overnight. MAP 82 this AM.     LVAD Parameters:  Heartmate 3 LEFT VS  Flow (Lpm): 4.2 Lpm  Pulse Index (PI): 3-4 overnight   Speed (rpm): 5200 rpm  Power (mari): 3.6 mari  Alarms: None. No PI events or speed drops.   Current settin    I/Os:  Net negative -2.1 L on  but had two unmeasured UOP episodes so suspect more net negative than what is recorded    Vitals:    23 0552 23 0000 23 0042   Weight: 79 kg (174 lb 2.6 oz) 78.1 kg (172 lb 2.9 oz) 77.3 kg (170 lb 6.7 oz)     EDW: ~165-170 lbs     Constitutional: awake, alert, cooperative, no apparent distress, and appears stated age  Eyes: sclera clear, conjunctiva normal  Respiratory: No increased work of breathing, good air exchange, no wheezing  Cardiovascular: LVAD hum.  JVP estimated to 8-10 cm H20  GI: soft, non-distended, non-tender, no masses palpated  Skin: no bruising or bleeding  Extremities: Minimal, 2+ pitting edema around the ankles   Neurologic: Awake, alert, interactive       Current medication list:    acetaminophen  975 mg Oral Q8H HOWIE     amiodarone  200 mg Oral Daily     aspirin  81 mg Oral or NG Tube Daily     digoxin  250 mcg Oral Daily     empagliflozin  10 mg Oral Daily     gabapentin  300 mg Oral or Feeding Tube TID     lidocaine  1 patch Transdermal Q24H     lidocaine   Transdermal Q8H HOWIE     lisinopril  2.5 mg Oral Daily     magnesium oxide  400 mg Oral Q4H     pantoprazole  40 mg Oral QAM AC     polyethylene glycol  17 g Oral BID     senna-docusate  2 tablet Oral BID      sodium chloride (PF)  3 mL Intracatheter Q8H     spironolactone  12.5 mg Oral Daily     Warfarin Therapy Reminder  1 each Oral See Admin Instructions       Recent labs:  Na 133, K 4.0 (4.2), Mg 1.9 (1.9), Cr 0.83 (0.89)  INR 1.75 (1.70)

## 2023-04-06 NOTE — PLAN OF CARE
"BP (!) 78/40 (BP Location: Right arm)   Pulse 77   Temp 98.3  F (36.8  C) (Oral)   Resp 17   Ht 1.702 m (5' 7\")   Wt 77.3 kg (170 lb 6.7 oz)   SpO2 99%   BMI 26.69 kg/m      Shift: 1900 - 0730  VS: Stable on RA, afebrile  Cardiac: Telemetry monitoring in progress, rhythm has been normal sinus throughout shift. HR: 70s - 80s, doppler MAPs have been above 65 mmHg. All LVAD numbers within parameters this shift, no alarms.  Neuro: AOx4  Labs: RN managed heparin Xa adjusted per order this shift, next HepXa scheduled for 1215. INR: 1.75. K, Mag, and Phos protocols order (Mag replacement ordered, phosphorus added to AM lab draw)  Respiratory: Pt denies dyspnea, no cough noted. Lung sounds are clear throughout.  Pain/Nausea/PRN: Pt denies nausea. PRN oxycodone x2 and PRN dilaudid x1  Diet: Regular diet, 2 L fluid restriction  LDA: L PIV (Infusing heparin gtt @ 1550 units/hr)  GI/: Voiding adequate amounts without difficulty, LBM: 4/5  Skin: No new findings this shift  Mobility: UAL  Plan: Plan for patient and family to take LVAD test this afternoon, likely discharge home on 4/7.    Will give report to oncoming nurse. Pt left in stable condition, care relinquished at this time.       "

## 2023-04-07 ENCOUNTER — CARE COORDINATION (OUTPATIENT)
Dept: CARDIOLOGY | Facility: CLINIC | Age: 58
End: 2023-04-07
Payer: COMMERCIAL

## 2023-04-07 ENCOUNTER — APPOINTMENT (OUTPATIENT)
Dept: PHYSICAL THERAPY | Facility: CLINIC | Age: 58
DRG: 001 | End: 2023-04-07
Attending: NURSE PRACTITIONER
Payer: COMMERCIAL

## 2023-04-07 PROBLEM — Z95.811 LVAD (LEFT VENTRICULAR ASSIST DEVICE) PRESENT (H): Status: ACTIVE | Noted: 2023-04-07

## 2023-04-07 LAB
ANION GAP SERPL CALCULATED.3IONS-SCNC: 10 MMOL/L (ref 7–15)
ANION GAP SERPL CALCULATED.3IONS-SCNC: 14 MMOL/L (ref 7–15)
BUN SERPL-MCNC: 11 MG/DL (ref 6–20)
BUN SERPL-MCNC: 11.5 MG/DL (ref 6–20)
CALCIUM SERPL-MCNC: 8.6 MG/DL (ref 8.6–10)
CALCIUM SERPL-MCNC: 9 MG/DL (ref 8.6–10)
CHLORIDE SERPL-SCNC: 95 MMOL/L (ref 98–107)
CHLORIDE SERPL-SCNC: 96 MMOL/L (ref 98–107)
CREAT SERPL-MCNC: 0.91 MG/DL (ref 0.67–1.17)
CREAT SERPL-MCNC: 1.01 MG/DL (ref 0.67–1.17)
DEPRECATED HCO3 PLAS-SCNC: 25 MMOL/L (ref 22–29)
DEPRECATED HCO3 PLAS-SCNC: 28 MMOL/L (ref 22–29)
DIGOXIN SERPL-MCNC: 1.8 NG/ML (ref 0.6–2)
ERYTHROCYTE [DISTWIDTH] IN BLOOD BY AUTOMATED COUNT: 15 % (ref 10–15)
GFR SERPL CREATININE-BSD FRML MDRD: 88 ML/MIN/1.73M2
GFR SERPL CREATININE-BSD FRML MDRD: >90 ML/MIN/1.73M2
GLUCOSE SERPL-MCNC: 106 MG/DL (ref 70–99)
GLUCOSE SERPL-MCNC: 120 MG/DL (ref 70–99)
HCT VFR BLD AUTO: 35.6 % (ref 40–53)
HGB BLD-MCNC: 11 G/DL (ref 13.3–17.7)
HOLD SPECIMEN: NORMAL
INR PPP: 1.62 (ref 0.85–1.15)
MAGNESIUM SERPL-MCNC: 2 MG/DL (ref 1.7–2.3)
MCH RBC QN AUTO: 29.7 PG (ref 26.5–33)
MCHC RBC AUTO-ENTMCNC: 30.9 G/DL (ref 31.5–36.5)
MCV RBC AUTO: 96 FL (ref 78–100)
MDC_IDC_LEAD_IMPLANT_DT: NORMAL
MDC_IDC_LEAD_LOCATION: NORMAL
MDC_IDC_LEAD_LOCATION_DETAIL_1: NORMAL
MDC_IDC_LEAD_MFG: NORMAL
MDC_IDC_LEAD_MODEL: NORMAL
MDC_IDC_LEAD_POLARITY_TYPE: NORMAL
MDC_IDC_LEAD_SERIAL: NORMAL
MDC_IDC_MSMT_BATTERY_DTM: NORMAL
MDC_IDC_MSMT_BATTERY_REMAINING_LONGEVITY: 120 MO
MDC_IDC_MSMT_BATTERY_REMAINING_PERCENTAGE: 100 %
MDC_IDC_MSMT_BATTERY_STATUS: NORMAL
MDC_IDC_MSMT_CAP_CHARGE_DTM: NORMAL
MDC_IDC_MSMT_CAP_CHARGE_TIME: 10.4 S
MDC_IDC_MSMT_CAP_CHARGE_TYPE: NORMAL
MDC_IDC_MSMT_LEADCHNL_RV_IMPEDANCE_VALUE: 372 OHM
MDC_IDC_MSMT_LEADCHNL_RV_LEAD_CHANNEL_STATUS: NORMAL
MDC_IDC_MSMT_LEADCHNL_RV_PACING_THRESHOLD_AMPLITUDE: 1.4 V
MDC_IDC_MSMT_LEADCHNL_RV_PACING_THRESHOLD_PULSEWIDTH: 0.4 MS
MDC_IDC_PG_IMPLANT_DTM: NORMAL
MDC_IDC_PG_MFG: NORMAL
MDC_IDC_PG_MODEL: NORMAL
MDC_IDC_PG_SERIAL: NORMAL
MDC_IDC_PG_TYPE: NORMAL
MDC_IDC_SESS_CLINIC_NAME: NORMAL
MDC_IDC_SESS_DTM: NORMAL
MDC_IDC_SESS_TYPE: NORMAL
MDC_IDC_SET_BRADY_LOWRATE: 40 {BEATS}/MIN
MDC_IDC_SET_BRADY_MODE: NORMAL
MDC_IDC_SET_LEADCHNL_RV_PACING_AMPLITUDE: 2.8 V
MDC_IDC_SET_LEADCHNL_RV_PACING_POLARITY: NORMAL
MDC_IDC_SET_LEADCHNL_RV_PACING_PULSEWIDTH: 0.4 MS
MDC_IDC_SET_LEADCHNL_RV_SENSING_ADAPTATION_MODE: NORMAL
MDC_IDC_SET_LEADCHNL_RV_SENSING_POLARITY: NORMAL
MDC_IDC_SET_LEADCHNL_RV_SENSING_SENSITIVITY: 0.6 MV
MDC_IDC_SET_ZONE_DETECTION_INTERVAL: 300 MS
MDC_IDC_SET_ZONE_DETECTION_INTERVAL: 353 MS
MDC_IDC_SET_ZONE_TYPE: NORMAL
MDC_IDC_SET_ZONE_TYPE: NORMAL
MDC_IDC_SET_ZONE_VENDOR_TYPE: NORMAL
MDC_IDC_SET_ZONE_VENDOR_TYPE: NORMAL
MDC_IDC_STAT_BRADY_DTM_END: NORMAL
MDC_IDC_STAT_BRADY_DTM_START: NORMAL
MDC_IDC_STAT_BRADY_RV_PERCENT_PACED: 0 %
MDC_IDC_STAT_EPISODE_RECENT_COUNT: 0
MDC_IDC_STAT_EPISODE_RECENT_COUNT: 4
MDC_IDC_STAT_EPISODE_RECENT_COUNT_DTM_END: NORMAL
MDC_IDC_STAT_EPISODE_RECENT_COUNT_DTM_START: NORMAL
MDC_IDC_STAT_EPISODE_TYPE: NORMAL
MDC_IDC_STAT_EPISODE_VENDOR_TYPE: NORMAL
MDC_IDC_STAT_TACHYTHERAPY_ATP_DELIVERED_RECENT: 0
MDC_IDC_STAT_TACHYTHERAPY_ATP_DELIVERED_TOTAL: 1
MDC_IDC_STAT_TACHYTHERAPY_RECENT_DTM_END: NORMAL
MDC_IDC_STAT_TACHYTHERAPY_RECENT_DTM_START: NORMAL
MDC_IDC_STAT_TACHYTHERAPY_SHOCKS_ABORTED_RECENT: 0
MDC_IDC_STAT_TACHYTHERAPY_SHOCKS_ABORTED_TOTAL: 0
MDC_IDC_STAT_TACHYTHERAPY_SHOCKS_DELIVERED_RECENT: 0
MDC_IDC_STAT_TACHYTHERAPY_SHOCKS_DELIVERED_TOTAL: 0
MDC_IDC_STAT_TACHYTHERAPY_TOTAL_DTM_END: NORMAL
MDC_IDC_STAT_TACHYTHERAPY_TOTAL_DTM_START: NORMAL
PHOSPHATE SERPL-MCNC: 3.8 MG/DL (ref 2.5–4.5)
PLATELET # BLD AUTO: 639 10E3/UL (ref 150–450)
POTASSIUM SERPL-SCNC: 4.3 MMOL/L (ref 3.4–5.3)
POTASSIUM SERPL-SCNC: 4.4 MMOL/L (ref 3.4–5.3)
RBC # BLD AUTO: 3.7 10E6/UL (ref 4.4–5.9)
SODIUM SERPL-SCNC: 134 MMOL/L (ref 136–145)
SODIUM SERPL-SCNC: 134 MMOL/L (ref 136–145)
UFH PPP CHRO-ACNC: 0.32 IU/ML
UFH PPP CHRO-ACNC: 0.39 IU/ML
WBC # BLD AUTO: 13.3 10E3/UL (ref 4–11)

## 2023-04-07 PROCEDURE — 85610 PROTHROMBIN TIME: CPT | Performed by: STUDENT IN AN ORGANIZED HEALTH CARE EDUCATION/TRAINING PROGRAM

## 2023-04-07 PROCEDURE — 97530 THERAPEUTIC ACTIVITIES: CPT | Mod: GP

## 2023-04-07 PROCEDURE — 250N000013 HC RX MED GY IP 250 OP 250 PS 637: Performed by: STUDENT IN AN ORGANIZED HEALTH CARE EDUCATION/TRAINING PROGRAM

## 2023-04-07 PROCEDURE — 99232 SBSQ HOSP IP/OBS MODERATE 35: CPT | Mod: 25 | Performed by: STUDENT IN AN ORGANIZED HEALTH CARE EDUCATION/TRAINING PROGRAM

## 2023-04-07 PROCEDURE — 36415 COLL VENOUS BLD VENIPUNCTURE: CPT | Performed by: INTERNAL MEDICINE

## 2023-04-07 PROCEDURE — 250N000013 HC RX MED GY IP 250 OP 250 PS 637: Performed by: INTERNAL MEDICINE

## 2023-04-07 PROCEDURE — 250N000013 HC RX MED GY IP 250 OP 250 PS 637

## 2023-04-07 PROCEDURE — 93750 INTERROGATION VAD IN PERSON: CPT | Performed by: STUDENT IN AN ORGANIZED HEALTH CARE EDUCATION/TRAINING PROGRAM

## 2023-04-07 PROCEDURE — 120N000003 HC R&B IMCU UMMC

## 2023-04-07 PROCEDURE — 80048 BASIC METABOLIC PNL TOTAL CA: CPT

## 2023-04-07 PROCEDURE — 80162 ASSAY OF DIGOXIN TOTAL: CPT | Performed by: INTERNAL MEDICINE

## 2023-04-07 PROCEDURE — 250N000011 HC RX IP 250 OP 636: Performed by: PHYSICIAN ASSISTANT

## 2023-04-07 PROCEDURE — 84100 ASSAY OF PHOSPHORUS: CPT | Performed by: INTERNAL MEDICINE

## 2023-04-07 PROCEDURE — 83735 ASSAY OF MAGNESIUM: CPT | Performed by: INTERNAL MEDICINE

## 2023-04-07 PROCEDURE — 85520 HEPARIN ASSAY: CPT | Performed by: INTERNAL MEDICINE

## 2023-04-07 PROCEDURE — 85027 COMPLETE CBC AUTOMATED: CPT | Performed by: PHYSICIAN ASSISTANT

## 2023-04-07 PROCEDURE — 36415 COLL VENOUS BLD VENIPUNCTURE: CPT

## 2023-04-07 RX ORDER — MAGNESIUM OXIDE 400 MG/1
400 TABLET ORAL EVERY 4 HOURS
Status: COMPLETED | OUTPATIENT
Start: 2023-04-07 | End: 2023-04-07

## 2023-04-07 RX ORDER — WARFARIN SODIUM 7.5 MG/1
7.5 TABLET ORAL
Status: COMPLETED | OUTPATIENT
Start: 2023-04-07 | End: 2023-04-07

## 2023-04-07 RX ADMIN — EMPAGLIFLOZIN 10 MG: 10 TABLET, FILM COATED ORAL at 09:46

## 2023-04-07 RX ADMIN — MAGNESIUM OXIDE TAB 400 MG (241.3 MG ELEMENTAL MG) 400 MG: 400 (241.3 MG) TAB at 09:53

## 2023-04-07 RX ADMIN — OXYCODONE HYDROCHLORIDE 10 MG: 10 TABLET ORAL at 12:41

## 2023-04-07 RX ADMIN — PANTOPRAZOLE SODIUM 40 MG: 40 TABLET, DELAYED RELEASE ORAL at 09:46

## 2023-04-07 RX ADMIN — SPIRONOLACTONE 12.5 MG: 25 TABLET, FILM COATED ORAL at 10:37

## 2023-04-07 RX ADMIN — GABAPENTIN 300 MG: 300 CAPSULE ORAL at 14:40

## 2023-04-07 RX ADMIN — BUMETANIDE 1 MG: 1 TABLET ORAL at 09:47

## 2023-04-07 RX ADMIN — ACETAMINOPHEN 975 MG: 325 TABLET, FILM COATED ORAL at 15:54

## 2023-04-07 RX ADMIN — LIDOCAINE PATCH 4% 1 PATCH: 40 PATCH TOPICAL at 09:44

## 2023-04-07 RX ADMIN — ASPIRIN 81 MG 81 MG: 81 TABLET ORAL at 09:46

## 2023-04-07 RX ADMIN — GABAPENTIN 300 MG: 300 CAPSULE ORAL at 20:03

## 2023-04-07 RX ADMIN — LISINOPRIL 2.5 MG: 2.5 TABLET ORAL at 09:46

## 2023-04-07 RX ADMIN — OXYCODONE HYDROCHLORIDE 10 MG: 10 TABLET ORAL at 22:34

## 2023-04-07 RX ADMIN — ACETAMINOPHEN 975 MG: 325 TABLET, FILM COATED ORAL at 08:35

## 2023-04-07 RX ADMIN — HEPARIN SODIUM AND DEXTROSE 1700 UNITS/HR: 10000; 5 INJECTION INTRAVENOUS at 10:38

## 2023-04-07 RX ADMIN — OXYCODONE HYDROCHLORIDE 10 MG: 10 TABLET ORAL at 18:07

## 2023-04-07 RX ADMIN — DIGOXIN 250 MCG: 125 TABLET ORAL at 09:47

## 2023-04-07 RX ADMIN — WARFARIN SODIUM 7.5 MG: 7.5 TABLET ORAL at 17:40

## 2023-04-07 RX ADMIN — OXYCODONE HYDROCHLORIDE 10 MG: 10 TABLET ORAL at 08:35

## 2023-04-07 RX ADMIN — OXYCODONE HYDROCHLORIDE 10 MG: 10 TABLET ORAL at 03:31

## 2023-04-07 RX ADMIN — GABAPENTIN 300 MG: 300 CAPSULE ORAL at 09:46

## 2023-04-07 RX ADMIN — AMIODARONE HYDROCHLORIDE 200 MG: 200 TABLET ORAL at 09:47

## 2023-04-07 RX ADMIN — MAGNESIUM OXIDE TAB 400 MG (241.3 MG ELEMENTAL MG) 400 MG: 400 (241.3 MG) TAB at 14:43

## 2023-04-07 ASSESSMENT — ACTIVITIES OF DAILY LIVING (ADL)
ADLS_ACUITY_SCORE: 20

## 2023-04-07 NOTE — PLAN OF CARE
Goal Outcome Evaluation:      Plan of Care Reviewed With: patient    Overall Patient Progress: improvingOverall Patient Progress: improving    Outcome Evaluation: Appetite good, consuming high protein foods at meal times and for snacks. Provided cafe meal passes x5 per menu fatigue. Continue to encourage intake.

## 2023-04-07 NOTE — PROGRESS NOTES
The DLES dressing was changed today. There was less serosanguinous drainage on the dressing today than yesterday. The site also looked less red than yesterday. There was no wet drainage to culture. Goyo the surgery PA looked at the site.    There is some skin irritation near where the U shaped piece of the outer covers of the dressing touch the skin. I had Landry use medipore tape instead of the outer two covers of the dressing change kit. I asked the surgery RABIA Goyo to change the dressing change order to this.

## 2023-04-07 NOTE — PROGRESS NOTES
All VAD education is complete.   Both patient, Landry Fragoso, his wife, and Oneil Fragoso, his son verbalized understanding of and/or correctly demonstrated the ability to:   -Switch power sources  -Change controller. They both demonstrate controller change properly and verbalized understanding that patient should only change controller after discussion with VAD Coordinator and in the presence of a trained caregiver whenever possible.   -Identify controller and states function, power sources, and Battery charger function, alarms, and alarms management.   -Perform Self test on controller   -State VAD precautions and warnings (including but not limited to: travel bags within arms reach at all time, using AC power while sleeping, avoid total immersion in water, boating, MRIs, metal detectors, contact sports, vacuuming or activity that might create static electricity).   -Identify an emergency and state the correct action in the event of an emergency.   -Recognize situations that require paging VAD coordinator and demonstrate proper paging technique.   -Patient, Landry, and Oneil present for doppler blood pressure education.   Instructions given on proper application of blood pressure cuff.   Instructions given on auscultating antecubital pulse using the doppler  Instructions given on how to pump up the cuff and slowly release to obtain the doppler 'ed pressure.   Patient, Landry, and Oneil able to recite back instructions.  Landry and Oneil demonstrated proper technique.  -Determine procedures that necessitate prophylactic antibiotics.   -Warfarin is managed by [facility]. Pt understands that he should never stop or change coumadin dose unless directed to do so by either VAD team or [facility] anticoagulation.  -Verbalized understanding of VAD parameters, normal limits, and actions required when outside of range.    -Landry and Oneil demonstrated removing the old dressing, cleaning the exit site, and applying  new dressing using sterile technique. Understands need for DL immobilization and signs/symptoms of infection.  -Patient, Landry and Oneil passed the written test.  -Patient confirms having working 3-pronged grounded outlets at home.  -Critical life-sustaining medical equipment letter faxed to [employer].  -VAD information packet faxed to pt's identified EMS service, primary care provider, and local cardiologist (if applicable).  -Referral sent to [facility] medication monitoring clinic for warfaring management and dosing. [place] [facility] [address] [place]  -Driveline exit site supplies will be ordered from [employer]  Follow-up appointments scheduled on 4/12 with Mile Bolivar from cardiology and with Dr Waller the surgeon on 4/27.

## 2023-04-07 NOTE — PROGRESS NOTES
[facility] [place]    Cardiology Consult Progress Note- Cardiology      Date of Admission:  3/15/2023     Assessment & Plan: HVSL   57 year old man with past medical history of HFrEF (EF 10% 5/2022) 2/2 NICM s/p ICD, tobacco use disorder, marijuana use, HTN, lumbar radiculopathy, CKD who presents after being found down at home. Found to have had a prolonged episode of VT with concern for cardiac arrest s/p ROSC in the field now s/p LVAD with HM3 on 03/23/23 and tricuspid annuloplasty.     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 Presented with out of hospital cardiac arrest with VT for 5 min 43 seconds of VT at 199 bpm, with spontaneous conversion to sinus rhythm without defibrillation on device interrogation on 3/15/2023. INR still subtherapeutic (goal of 2-3).    - VT secondary prevention ICD in place  - Volume status: Euvolumic, dry weight 169LBs: continue 1 mg Bumex PO daily  .- BB: Hold PTA carvedilol 12.5 mg BID   - ACEi/ARB/ARNI: Lisinopril 2.5mg  - MRA: continue spironolactone 12.5 mg daily  - SGLT2i: continue Empagliflozin 10 mg daily   - SCD ppx/BiV pacer: ICD, reprogrammed device to provide ATP burst at VT threshold  - Continue warfarin per pharmacy dosing  - Continue heparin bridge until at therapeutic INR 2-3  - Continue digoxin 250 mcg daily  - Amiodarone 200 mg daily       Patient staffed w/ Dr. Prieto Santoyo MD  Fellow Physician PGY-5    ______________________________________________________________________    Interval History    Care team notes reviewed. No acute events overnight. Still feeling fatigued from lack of sleep. No new chest pains, palpitations, or shortness of breath.     Physical Exam   Vital Signs: Temp: 98.1  F (36.7  C) Temp src: Oral BP: (!) 72/61 Pulse: 82   Resp: 15 SpO2: 97 % O2 Device: None (Room air)    Weight: 169 lbs 5.01 oz       Vitals:    04/05/23 0000 04/06/23 0042 04/07/23 0542   Weight: 78.1 kg (172 lb 2.9 oz) 77.3 kg (170 lb 6.7 oz) 76.8 kg (169 lb 5 oz)     EDW: ~165-170 lbs     Constitutional: awake, alert, cooperative, no apparent distress, and appears stated age  Eyes: sclera clear, conjunctiva normal  Respiratory: No increased work of breathing, good air exchange, no wheezing  Cardiovascular: LVAD hum.  JVP estimated to 8-10 cm H20  GI: soft, non-distended, non-tender, no masses palpated  Skin: no bruising or bleeding  Extremities: Minimal, 2+ pitting edema around the ankles   Neurologic: Awake, alert, interactive       Current medication list:    acetaminophen  975 mg Oral Q8H HOWIE     amiodarone  200 mg Oral Daily     aspirin  81 mg Oral or NG Tube Daily     bumetanide  1 mg Oral Daily     digoxin  250 mcg Oral Daily     empagliflozin  10 mg Oral Daily     gabapentin  300 mg Oral or Feeding Tube TID     lidocaine  1 patch Transdermal Q24H     lidocaine   Transdermal Q8H HOWIE     lisinopril  2.5 mg Oral Daily     magnesium oxide  400 mg Oral Q4H     methocarbamol  500 mg Oral 4x Daily     pantoprazole  40 mg Oral QAM AC     polyethylene glycol  17 g Oral BID     senna-docusate  2 tablet Oral BID     sodium chloride (PF)  3 mL Intracatheter Q8H     spironolactone  12.5 mg Oral Daily     Warfarin Therapy Reminder  1 each Oral See Admin Instructions       Recent labs:  Na 133, K 4.0 (4.2), Mg 1.9 (1.9), Cr 0.83 (0.89)  INR 1.75 (1.70)

## 2023-04-07 NOTE — PROGRESS NOTES
CLINICAL NUTRITION SERVICES - REASSESSMENT NOTE     Nutrition Prescription    Malnutrition Status:    Patient does not meet two of the established criteria necessary for diagnosing malnutrition     Recommendations already ordered by Registered Dietitian (RD):    Cafe meal pass x5     Low sodium recipe handout + sodium free flavoring tips    Continue to encourage adequate protein intake     Future/Additional Recommendations:  Monitor nutrition-related findings and follow pt per protocol     EVALUATION OF THE PROGRESS TOWARD GOALS   Diet: Regular, 2L fluid restriction   Snacks: Allow ONS if requested, PRN    PO Intake: % intake consistently documented to I/O. Consistently ordering 2-3 meals daily. Pt feels that his appetite is good; has been trying to consume high protein foods with each meal and between meals. Has snacks in room such as nuts. C/O menu fatigue. Providing cafe meal passes. Provided with resources for low Na recipes per pt request. Continue to encourage intake.       NEW FINDINGS   GI:    0-3 unmeasured BMs per day over the past ~4 days (downward trend; previously 5+ per I/O)    Weights:    Down 1.4 kg since admission; weights vary by day likely d/t pt condition, fluid status variance     Labs:    Reviewed   Electrolytes  Potassium (mmol/L)   Date Value   04/07/2023 4.3   04/06/2023 4.2   04/06/2023 4.0   11/20/2019 4.5   05/07/2019 4.4   11/19/2018 4.0     Potassium POCT (mmol/L)   Date Value   03/23/2023 3.3 (L)   03/23/2023 3.4 (L)   03/23/2023 7.6 (HH)     Phosphorus (mg/dL)   Date Value   04/07/2023 3.8   04/06/2023 3.3   04/03/2023 3.3   04/02/2023 3.1   04/01/2023 2.6   11/19/2018 3.8    Blood Glucose  Glucose (mg/dL)   Date Value   04/07/2023 106 (H)   04/06/2023 103 (H)   04/06/2023 104 (H)   04/05/2023 119 (H)   04/05/2023 87   11/20/2019 118 (H)   05/07/2019 132 (H)   11/19/2018 99   06/08/2017 103 (H)   01/12/2017 91     GLUCOSE BY METER POCT (mg/dL)   Date Value   04/06/2023 98    04/05/2023 91   04/04/2023 139 (H)   04/04/2023 89   04/03/2023 102 (H)     Hemoglobin A1C (%)   Date Value   12/04/2018 5.5    Inflammatory Markers  CRP Inflammation (mg/L)   Date Value   11/19/2018 <2.9     WBC (10e9/L)   Date Value   11/20/2019 9.8   11/19/2018 11.0   06/08/2017 14.9 (H)     WBC Count (10e3/uL)   Date Value   04/07/2023 13.3 (H)   04/06/2023 15.6 (H)   04/05/2023 17.3 (H)     Albumin (g/dL)   Date Value   04/03/2023 2.6 (L)   04/02/2023 2.7 (L)   04/01/2023 2.7 (L)   11/20/2019 3.6   11/19/2018 3.7   01/09/2017 3.5      Magnesium (mg/dL)   Date Value   04/07/2023 2.0   04/06/2023 1.9   04/05/2023 1.9   01/12/2017 2.1   01/11/2017 2.1   01/10/2017 2.1     Sodium (mmol/L)   Date Value   04/07/2023 134 (L)   04/06/2023 134 (L)   04/06/2023 133 (L)   11/20/2019 138   05/07/2019 136   11/19/2018 134    Renal  Urea Nitrogen (mg/dL)   Date Value   04/07/2023 11.5   04/06/2023 11.1   04/06/2023 11.0   11/20/2019 23   05/07/2019 19   11/19/2018 24     Creatinine (mg/dL)   Date Value   04/07/2023 0.91   04/06/2023 0.84   04/06/2023 0.83   11/20/2019 0.90   05/07/2019 0.94   11/19/2018 1.35 (H)     Additional  Triglycerides (mg/dL)   Date Value   03/20/2023 85   03/18/2023 93   11/19/2018 282 (H)   01/10/2017 103     Ketones Urine (mg/dL)   Date Value   03/29/2023 Negative   01/10/2017 Negative        Medications:    Bumex    Jardiance    Miralax/Senna     Warfairn    Skin:    No new deficits noted (VAD); WOCN not following     MALNUTRITION  % Intake: No decreased intake noted  % Weight Loss: Unable to assess - confounded by fluid status   Subcutaneous Fat Loss: None observed  Muscle Loss: Temporal:  Mild and Facial & jaw region:  mild  Fluid Accumulation/Edema: Does not meet criteria  Malnutrition Diagnosis: Patient does not meet two of the established criteria necessary for diagnosing malnutrition but is at risk for malnutrition    Previous Goals   Patient to consume % of nutritionally adequate  meal trays TID, or the equivalent with supplements/snacks.  Evaluation: Met    Previous Nutrition Diagnosis  Inadequate protein intake related to food and nutrition knowledge deficit as evidenced by pt inquiring about high-protein food sources  Evaluation: Improving    CURRENT NUTRITION DIAGNOSIS  Increased nutrient needs (protein) related to healing s/p LVAD as evidenced by est protein needs 1.5-2 gm/kg for 6-8 weeks post-op.       INTERVENTIONS  Implementation  Modify composition of meals/snacks - cafe meal passes   Nutrition education for nutrition relationship to health/disease    Goals  Patient to consume % of nutritionally adequate meal trays TID, or the equivalent with supplements/snacks.    Monitoring/Evaluation  Progress toward goals will be monitored and evaluated per protocol.    Andrey Godfrey RDN, LD, CNSC  6B RD pager: [phone]   6B work-room RD phone: *[phone]    Weekend/Holiday RD pager [phone]

## 2023-04-07 NOTE — PLAN OF CARE
Problem: Plan of Care - These are the overarching goals to be used throughout the patient stay.    Goal: Plan of Care Review  Description: The Plan of Care Review/Shift note should be completed every shift.  The Outcome Evaluation is a brief statement about your assessment that the patient is improving, declining, or no change.  This information will be displayed automatically on your shift note.  Outcome: Not Progressing  Flowsheets (Taken 4/6/2023 1931)  Outcome Evaluation: LVAD teaching today w/ pt, wife, and son, INR-1.75, hep gtt infusing  Plan of Care Reviewed With: patient  Overall Patient Progress: no change   NURSING PROGRESS NOTE  Shift Summary      Date: April 6, 2023     Neuro/Musculoskeletal:  A&Ox4.   Cardiac:  SR BBB  VSS.     Respiratory:  Sating in the 90s on RA.  GI/:  Adequate urine output.  LBM: today  Diet/Appetite:  Tolerating reg diet.  Activity:  SBA   Pain:  8-10/10 L rib pain, oxy given twice  Skin:  No new deficits noted.   LDAs + Drips/IVF:  LVAD site CDI, heparin gtt infusing at 1550 units/hr  Protocols/Labs:  mag replaced, AM recheck for K, mg, and phos, hep10A recheck at 1730    Pertinent Shift Updates:  LVAD teaching today w/ pt, wife, and son, INR-1.75, hep gtt infusing      Plan:  Continue plan of care      Darvin Steen RN  .................................................... April 6, 2023   7:33 PM  [facility] ([facility]): [facility] ICU (Unit 6B)

## 2023-04-07 NOTE — PLAN OF CARE
"BP (!) 72/61 (BP Location: Right arm)   Pulse 82   Temp 98.1  F (36.7  C) (Oral)   Resp 15   Ht 1.702 m (5' 7\")   Wt 76.8 kg (169 lb 5 oz)   SpO2 97%   BMI 26.52 kg/m      Shift: 0 - 730  VS: Stable on RA, afebrile  Cardiac: Telemetry monitoring in progress, rhythm has been normal sinus throughout shift. HR: 80s - 100s, doppler MAPs have been within goal. LVAD numbers within parameters, no alarms this shift.  Neuro: AOx4  Labs: INR: 1.62, WBCs: 13.3, Hgb: 11.0, hematocrit: 35.6 (LVAD parameters changed); K: 4.3, Ma.0, Phos: 3.8 (rechecks scheduled for AM, magnesium replacement ordered)  Respiratory: Pt denies dyspnea, infrequent cough noted. Lung sounds are clear throughout with diminished bases bilaterally.  Pain/Nausea/PRN: Pt denies nausea. PRN oxycodone given x2 for rib pain as well as PRN voltaren gel x1  Diet: Regular diet, 2L fluid restriction  LDA: L PIV (Infusing heparin gtt @ 1700 units/hr)  GI/: Voiding adequate amounts without difficulty, LBM: 4/6  Skin: No new findings this shift  Mobility: UAL  Plan: Plan to discharge home when INR is therapeutic, LVAD test-out to be completed today.    Will give report to oncoming nurse. Pt left in stable condition, care relinquished at this time.       "

## 2023-04-07 NOTE — PLAN OF CARE
Neuro: A&Ox4.   Cardiac: SR. Weak pulses in LE. Trace dependent edema present in ankles. Has LVAD and ICD.  Respiratory: Sating >95% on RA.   GI/: Adequate urine output. Last BM 04/06  Diet/appetite: Tolerating reg diet. 2 L fluid restriction.  Activity:  Independent up to chair and bathroom.  Pain: Reporting 10/10 pain worsening with movement of the trunk. Pain in left ribs with radiation to left side of chest and back. States he wants to try to avoid more pain meds. Reports weakness and poor toleration of muscle relaxants.   Skin: No new deficits noted.  LDA's: L PIV infusing heparin.    Plan: Continue with POC. Notify primary team with changes.

## 2023-04-07 NOTE — PROGRESS NOTES
Cardiovascular Surgery Progress Note  04/07/2023         Assessment and Plan:     57 year old male with PMH of HFrEF (10%) secondary to NICM (suspected viral etiology) s/p ICD, chronic tobacco/marijuana use, COPD, HTN, CKD stage III, who presented to [facility] on 3/15 after being found down from a VT arrest. CPR was performed by family. Spontaneously converted to NSR without defibrillation. Down time suspected to be ~ 6 minutes. Presents to [facility] for LVAD placement with preop IABP by Dr. Matias on 3/23/23. He is now s/p LVAD (Heartmate III) placement, TVR, and PFO repair with Dr. Matias and Dr. Waller 3/23/22.    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 RV is mildly dilated and RV function is probably normal on milrinone with moderate to severe TR (there is reversal of hepatic venous flow). There is a swan in place which may be inhibiting coaptation. Trace AI. Mild MR. No MS. There is a continuous left to right shunt across a PFO.  Post-bypass JOAN: No AI. The aortic valve opens 1:3 cardiac cycles. No residual PFO. Atrial septum bulges into the left atrium. TR ring, mild to moderate TR. RV function mildly reduced on epi/norepi/vaso nitric. Inflow cannula is well positioned with low velocities. There is no visible aortic dissection.    HD stable. MAPS 70s-80s.  - ASA 81 mg daily  - BB not indicated   - lisinopril 2.5 mg daily  - digoxin 250 mcg daily  - spironolactone 12.5 mg daily  - Diuresis as below (per cardiology)    Atrial fibrillation with RVR 3/28, now NSR  - amiodarone infusion 0.5 mg/h started 3/28 per cards, ongoing afib  RVR so amiodarone increased to 1 mg/h  - transitioned to PO amiodarone 400 mg BID 3/29, down to 200 mg daily 4/2    Pulmonary:  Extubated POD 1 to 4 lpm via NC. Now saturating well on room air.   - Pulm hygiene, IS, activity and deep breathing    Neurology / MSK:  Acute post-operative pain   Pain well controlled with current regimen:  - Scheduled: Tylenol   - PRN: PO Oxycodone PRN. Refusing methocarbamol due to side effects of weakness, diclofenac PRN added for ongoing rib/MSK pain, recommend ice/heat, lido patches   - Discussed weaning opiates, dilaudid discontinued      / Renal / Fluid / Electrolytes:  Hyponatremia, improving, likely due to volume overload -- diuresis as below  Most recent creatinine 1.01; adequate UOP.   FLUID STATUS: Pre-op weight 172 lb, weight today 169 lb; I/O past 24 hours: -1.7 L  - Diuresis per cardiology -- intermittent IV Bumex  - Replete lytes per protocol  - Strict I/O, daily weights  - Avoid/limit nephrotoxins as able  Urinary retention post-op, resolved  - remove muro 3/30    GI / Nutrition:   Elevated LFTs, resolved   - Tolerating regular diet  - Continue bowel regimen, last BM 4/6    Endocrine:  Stress induced hyperglycemia   Hgb A1c 5.6  - Initially managed on insulin drip postop, transitioned to medium sliding scale; goal BG <180 for optimal healing    Infectious Disease:  Stress induced leukocytosis  Concern for pneumonia  WBC 13.3 (improving), remains afebrile, CRP improving  - CXR with left consolidation concerning for pneumonia and patient complains of cough, treated with empiric abx as below   - UA, blood cultures, and sputum sent 3/29 -- UA neg, blood cultures all NGTD  - started empiric IV vancomycin and Zosyn for likely HAP versus alternate post-operative infection. Discontinue vanco given negative MRSA nare  - CT chest 4/3 for persistent leukocytosis -- no infectious source identified   - ID consult for persistent leukocytosis, 4/3 -- recommended stopping IV Zosyn and  monitor, if fever spike or up-trending WBC/CRP consider dental source of infection   - re-consulted ID 4/5 for up-trending white count -- recommend continuing to monitor, hold off on re-starting antibiotics for now. If WBC/CRP continues to up-trend, consider dental source or thoracentesis per ID  - driveline site without evidence of infection -- ID reviewed photos of driveline site 4/7 and agree no evidence of infection and to continue to monitor off abx  - Completed perioperative antibiotics  - Continue to monitor fever curve, CBC    Hematology:   Acute blood loss anemia and thrombocytopenia  Hgb stable; Plt stable, no signs or symptoms of active bleeding  - CBC daily    Anticoagulation:   - ASA 81 mg  - Coumadin for LVAD with a goal INR of 2-3, INR remains sub-therapeutic this AM -- re-start heparin gtt per discussion with Dr. Matias    MSK/Skin:  Sternotomy  Surgical incision  - Sternal precautions  - Incisional cares per protocol    Prophylaxis:   - Stress ulcer prophylaxis: Pantoprazole 40 mg daily  - DVT prophylaxis: warfarin, SCD    Disposition:   - Transferred to  on 3/26  - Therapies recommending discharge to home pending INR    Discussed with Dr. Florencio Crowe PA-C on 4/7/2023 at 4:31 PM          Interval History:     No acute events overnight. States pain is well managed on current regimen, sleeping poorly. Wants to go home. Ongoing pain to left ribs since CPR pre-op, improving. Breathing is stable on room air, working with IS. Tolerating diet, is passing flatus, +BM, no n/v. Denies chest pain, palpitations, dizziness, syncopal symptoms, chills, myalgias, sternal popping/clicking. Still having rib soreness.         Physical Exam:     Gen: A&Ox4, NAD  Neuro: Intact no focal deficits   CV: LVAD hum  Pulm: CTA, no wheezing or rhonchi, normal breathing on RA  Abd: nondistended, normal BS, soft, nontender  Ext: trace peripheral edema  Incision: clean, dry, intact, no erythema, sternum  stable  Tubes/drain sites: dressing clean and dry  LVAD dressing clean, dry, intact. Driveline site with minimal erythema and minimal serous drainage, no purulence or induration   Speed: 5200, Power: 3.7, Flow: 4.3, PI: 3.3         Data:    Imaging:  reviewed recent imaging, no acute concerns    No results found for this or any previous visit (from the past 24 hour(s)).     Labs:  Recent Labs   Lab 04/07/23  1355 04/07/23  0337 04/06/23  1211 04/06/23  0716 04/06/23  0405 04/05/23  1458 04/05/23  0750 04/05/23  0639 04/05/23  0414 04/03/23  0730 04/03/23  0442 04/02/23  0806 04/02/23  0409   WBC  --  13.3*  --   --  15.6*  --  17.3*  --  15.3*   < > 18.8*  --  17.0*   HGB  --  11.0*  --   --  10.1*  --  11.0*  --  10.0*   < > 9.7*  --  9.8*   MCV  --  96  --   --  98  --  97  --  98   < > 96  --  96   PLT  --  639*  --   --  655*  --  685*  --  626*   < > 546*  --  488*   INR  --  1.62*  --   --  1.75*  --   --   --  1.70*   < > 1.68*  --  2.00*   * 134* 134*  --  133*   < >  --   --  133*   < > 131*  131*   < > 133*  133*   POTASSIUM 4.4 4.3 4.2  --  4.0   < >  --   --  4.1   < > 4.3  --  3.9   CHLORIDE 95* 96* 96*  --  94*   < >  --   --  95*   < > 93*  --  95*   CO2 25 28 30*  --  29   < >  --   --  30*   < > 29  --  29   BUN 11.0 11.5 11.1  --  11.0   < >  --   --  9.0   < > 10.4  --  11.9   CR 1.01 0.91 0.84  --  0.83   < >  --   --  0.92   < > 1.16  --  1.35*   ANIONGAP 14 10 8  --  10   < >  --   --  8   < > 9  --  9   TONY 9.0 8.6 8.9  --  8.6   < >  --   --  8.6   < > 8.3*  --  8.3*   * 106* 103*   < > 104*   < >  --    < > 87   < > 94   < > 84   ALBUMIN  --   --   --   --   --   --   --   --   --   --  2.6*  --  2.7*   PROTTOTAL  --   --   --   --   --   --   --   --   --   --  5.0*  --  5.1*   BILITOTAL  --   --   --   --   --   --   --   --   --   --  0.7  --  0.7   ALKPHOS  --   --   --   --   --   --   --   --   --   --  92  --  96   ALT  --   --   --   --   --   --   --   --   --   --   26  --  25   AST  --   --   --   --   --   --   --   --   --   --  36  --  40    < > = values in this interval not displayed.      GLUCOSE:   Recent Labs   Lab 04/07/23  1355 04/07/23  0337 04/06/23  1211 04/06/23  0716 04/06/23  0405 04/05/23  1458   * 106* 103* 98 104* 119*

## 2023-04-07 NOTE — PROGRESS NOTES
Brief Care Coordination Note    Per discussion w/the primary team, discharge is pending pt's INR. If pt's INR is therapeutic over the weekend, he may discharge, will need an INR scheduled for Monday.     Writer met w/patient and spouse, patient prefers OP [place] checks at the [facility] [place]. Writer placed Anticoagulation referral, called and scheduled lab for 4/10 w/mid morning appt time, discharge AVS updated. If patient does not discharge over the weekend, will need to reschedule OP INR as indicated.     Care coordination will continue to follow for discharge planning.     Lakshmi Manriquez, RNCC, BSN    [facility]    [address]  [address] [place] [id number]    [email or web address]    Office: [phone] Pager: [phone]

## 2023-04-08 LAB
ANION GAP SERPL CALCULATED.3IONS-SCNC: 14 MMOL/L (ref 7–15)
ANION GAP SERPL CALCULATED.3IONS-SCNC: 14 MMOL/L (ref 7–15)
BUN SERPL-MCNC: 10.5 MG/DL (ref 6–20)
BUN SERPL-MCNC: 10.6 MG/DL (ref 6–20)
CALCIUM SERPL-MCNC: 8.7 MG/DL (ref 8.6–10)
CALCIUM SERPL-MCNC: 9.4 MG/DL (ref 8.6–10)
CHLORIDE SERPL-SCNC: 94 MMOL/L (ref 98–107)
CHLORIDE SERPL-SCNC: 95 MMOL/L (ref 98–107)
CREAT SERPL-MCNC: 0.94 MG/DL (ref 0.67–1.17)
CREAT SERPL-MCNC: 0.96 MG/DL (ref 0.67–1.17)
DEPRECATED HCO3 PLAS-SCNC: 23 MMOL/L (ref 22–29)
DEPRECATED HCO3 PLAS-SCNC: 26 MMOL/L (ref 22–29)
ERYTHROCYTE [DISTWIDTH] IN BLOOD BY AUTOMATED COUNT: 15.2 % (ref 10–15)
GFR SERPL CREATININE-BSD FRML MDRD: >90 ML/MIN/1.73M2
GFR SERPL CREATININE-BSD FRML MDRD: >90 ML/MIN/1.73M2
GLUCOSE BLDC GLUCOMTR-MCNC: 84 MG/DL (ref 70–99)
GLUCOSE SERPL-MCNC: 102 MG/DL (ref 70–99)
GLUCOSE SERPL-MCNC: 112 MG/DL (ref 70–99)
HCT VFR BLD AUTO: 33.3 % (ref 40–53)
HGB BLD-MCNC: 10 G/DL (ref 13.3–17.7)
INR PPP: 1.75 (ref 0.85–1.15)
LDH SERPL L TO P-CCNC: 411 U/L (ref 0–250)
MAGNESIUM SERPL-MCNC: 1.9 MG/DL (ref 1.7–2.3)
MCH RBC QN AUTO: 28.9 PG (ref 26.5–33)
MCHC RBC AUTO-ENTMCNC: 30 G/DL (ref 31.5–36.5)
MCV RBC AUTO: 96 FL (ref 78–100)
PHOSPHATE SERPL-MCNC: 4.2 MG/DL (ref 2.5–4.5)
PLATELET # BLD AUTO: 616 10E3/UL (ref 150–450)
POTASSIUM SERPL-SCNC: 4.5 MMOL/L (ref 3.4–5.3)
POTASSIUM SERPL-SCNC: 4.7 MMOL/L (ref 3.4–5.3)
RBC # BLD AUTO: 3.46 10E6/UL (ref 4.4–5.9)
SODIUM SERPL-SCNC: 132 MMOL/L (ref 136–145)
SODIUM SERPL-SCNC: 134 MMOL/L (ref 136–145)
UFH PPP CHRO-ACNC: 0.2 IU/ML
UFH PPP CHRO-ACNC: 0.36 IU/ML
UFH PPP CHRO-ACNC: 0.46 IU/ML
WBC # BLD AUTO: 12.7 10E3/UL (ref 4–11)

## 2023-04-08 PROCEDURE — 36415 COLL VENOUS BLD VENIPUNCTURE: CPT | Performed by: THORACIC SURGERY (CARDIOTHORACIC VASCULAR SURGERY)

## 2023-04-08 PROCEDURE — 250N000013 HC RX MED GY IP 250 OP 250 PS 637

## 2023-04-08 PROCEDURE — 250N000013 HC RX MED GY IP 250 OP 250 PS 637: Performed by: STUDENT IN AN ORGANIZED HEALTH CARE EDUCATION/TRAINING PROGRAM

## 2023-04-08 PROCEDURE — 36415 COLL VENOUS BLD VENIPUNCTURE: CPT

## 2023-04-08 PROCEDURE — 120N000003 HC R&B IMCU UMMC

## 2023-04-08 PROCEDURE — 83735 ASSAY OF MAGNESIUM: CPT | Performed by: INTERNAL MEDICINE

## 2023-04-08 PROCEDURE — 85027 COMPLETE CBC AUTOMATED: CPT | Performed by: PHYSICIAN ASSISTANT

## 2023-04-08 PROCEDURE — 80048 BASIC METABOLIC PNL TOTAL CA: CPT

## 2023-04-08 PROCEDURE — 84100 ASSAY OF PHOSPHORUS: CPT | Performed by: INTERNAL MEDICINE

## 2023-04-08 PROCEDURE — 85610 PROTHROMBIN TIME: CPT | Performed by: STUDENT IN AN ORGANIZED HEALTH CARE EDUCATION/TRAINING PROGRAM

## 2023-04-08 PROCEDURE — 85520 HEPARIN ASSAY: CPT | Performed by: THORACIC SURGERY (CARDIOTHORACIC VASCULAR SURGERY)

## 2023-04-08 PROCEDURE — 250N000011 HC RX IP 250 OP 636: Performed by: PHYSICIAN ASSISTANT

## 2023-04-08 PROCEDURE — 83615 LACTATE (LD) (LDH) ENZYME: CPT | Performed by: NURSE PRACTITIONER

## 2023-04-08 PROCEDURE — 85520 HEPARIN ASSAY: CPT | Performed by: INTERNAL MEDICINE

## 2023-04-08 PROCEDURE — 250N000013 HC RX MED GY IP 250 OP 250 PS 637: Performed by: INTERNAL MEDICINE

## 2023-04-08 RX ORDER — WARFARIN SODIUM 7.5 MG/1
7.5 TABLET ORAL
Status: COMPLETED | OUTPATIENT
Start: 2023-04-08 | End: 2023-04-08

## 2023-04-08 RX ADMIN — GABAPENTIN 300 MG: 300 CAPSULE ORAL at 20:24

## 2023-04-08 RX ADMIN — ASPIRIN 81 MG 81 MG: 81 TABLET ORAL at 08:27

## 2023-04-08 RX ADMIN — OXYCODONE HYDROCHLORIDE 10 MG: 10 TABLET ORAL at 07:11

## 2023-04-08 RX ADMIN — LISINOPRIL 2.5 MG: 2.5 TABLET ORAL at 08:27

## 2023-04-08 RX ADMIN — OXYCODONE HYDROCHLORIDE 10 MG: 10 TABLET ORAL at 13:48

## 2023-04-08 RX ADMIN — BUMETANIDE 1 MG: 1 TABLET ORAL at 08:28

## 2023-04-08 RX ADMIN — EMPAGLIFLOZIN 10 MG: 10 TABLET, FILM COATED ORAL at 08:28

## 2023-04-08 RX ADMIN — SPIRONOLACTONE 12.5 MG: 25 TABLET, FILM COATED ORAL at 08:26

## 2023-04-08 RX ADMIN — HEPARIN SODIUM AND DEXTROSE 1850 UNITS/HR: 10000; 5 INJECTION INTRAVENOUS at 15:21

## 2023-04-08 RX ADMIN — GABAPENTIN 300 MG: 300 CAPSULE ORAL at 14:53

## 2023-04-08 RX ADMIN — OXYCODONE HYDROCHLORIDE 10 MG: 10 TABLET ORAL at 03:05

## 2023-04-08 RX ADMIN — DIGOXIN 250 MCG: 125 TABLET ORAL at 08:26

## 2023-04-08 RX ADMIN — PANTOPRAZOLE SODIUM 40 MG: 40 TABLET, DELAYED RELEASE ORAL at 08:27

## 2023-04-08 RX ADMIN — ACETAMINOPHEN 975 MG: 325 TABLET, FILM COATED ORAL at 16:04

## 2023-04-08 RX ADMIN — HEPARIN SODIUM AND DEXTROSE 1700 UNITS/HR: 10000; 5 INJECTION INTRAVENOUS at 03:09

## 2023-04-08 RX ADMIN — ACETAMINOPHEN 975 MG: 325 TABLET, FILM COATED ORAL at 00:00

## 2023-04-08 RX ADMIN — GABAPENTIN 300 MG: 300 CAPSULE ORAL at 08:27

## 2023-04-08 RX ADMIN — OXYCODONE HYDROCHLORIDE 10 MG: 10 TABLET ORAL at 20:23

## 2023-04-08 RX ADMIN — ACETAMINOPHEN 975 MG: 325 TABLET, FILM COATED ORAL at 08:27

## 2023-04-08 RX ADMIN — AMIODARONE HYDROCHLORIDE 200 MG: 200 TABLET ORAL at 08:27

## 2023-04-08 RX ADMIN — LIDOCAINE PATCH 4% 1 PATCH: 40 PATCH TOPICAL at 08:31

## 2023-04-08 RX ADMIN — ACETAMINOPHEN 650 MG: 325 TABLET ORAL at 03:05

## 2023-04-08 RX ADMIN — WARFARIN SODIUM 7.5 MG: 7.5 TABLET ORAL at 17:46

## 2023-04-08 ASSESSMENT — ACTIVITIES OF DAILY LIVING (ADL)
ADLS_ACUITY_SCORE: 20

## 2023-04-08 NOTE — PROGRESS NOTES
Cardiovascular Surgery Progress Note  04/08/2023         Assessment and Plan:     57 year old male with PMH of HFrEF (10%) secondary to NICM (suspected viral etiology) s/p ICD, chronic tobacco/marijuana use, COPD, HTN, CKD stage III, who presented to [facility] on 3/15 after being found down from a VT arrest. CPR was performed by family. Spontaneously converted to NSR without defibrillation. Down time suspected to be ~ 6 minutes. Presents to [facility] for LVAD placement with preop IABP by Dr. Matias on 3/23/23. He is now s/p LVAD (Heartmate III) placement, TVR, and PFO repair with Dr. Matias and Dr. Waller 3/23/22.    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 RV is mildly dilated and RV function is probably normal on milrinone with moderate to severe TR (there is reversal of hepatic venous flow). There is a swan in place which may be inhibiting coaptation. Trace AI. Mild MR. No MS. There is a continuous left to right shunt across a PFO.  Post-bypass JOAN: No AI. The aortic valve opens 1:3 cardiac cycles. No residual PFO. Atrial septum bulges into the left atrium. TR ring, mild to moderate TR. RV function mildly reduced on epi/norepi/vaso nitric. Inflow cannula is well positioned with low velocities. There is no visible aortic dissection.    HD stable. MAPS 70s-80s.  - ASA 81 mg daily  - BB not indicated   - lisinopril 2.5 mg daily  - digoxin 250 mcg daily  - spironolactone 12.5 mg daily  - Diuresis as below (per cardiology)    Atrial fibrillation with RVR 3/28, now NSR  - amiodarone infusion 0.5 mg/h started 3/28 per cards, ongoing afib  RVR so amiodarone increased to 1 mg/h  - transitioned to PO amiodarone 400 mg BID 3/29, down to 200 mg daily 4/2    Pulmonary:  Extubated POD 1 to 4 lpm via NC. Now saturating well on room air.   - Pulm hygiene, IS, activity and deep breathing    Neurology / MSK:  Acute post-operative pain   Pain well controlled with current regimen:  - Scheduled: Tylenol   - PRN: PO Oxycodone PRN. Refusing methocarbamol due to side effects of weakness, diclofenac PRN added for ongoing rib/MSK pain, recommend ice/heat, lido patches   - Discussed weaning opiates, dilaudid discontinued      / Renal / Fluid / Electrolytes:  Hyponatremia, improving, likely due to volume overload -- diuresis as below  Most recent creatinine WNL; adequate UOP.   FLUID STATUS: Pre-op weight 172 lb, weight today 169 lb; I/O past 24 hours: -2 L  - Diuresis per cardiology -- intermittent IV Bumex, transitioned to bumex 1 mg PO daily on 4/6  - Replete lytes per protocol  - Strict I/O, daily weights  - Avoid/limit nephrotoxins as able  Urinary retention post-op, resolved  - remove muro 3/30    GI / Nutrition:   Elevated LFTs, resolved   - Tolerating regular diet  - Continue bowel regimen, last BM 4/6    Endocrine:  Stress induced hyperglycemia   Hgb A1c 5.6  - Initially managed on insulin drip postop, transitioned to medium sliding scale; goal BG <180 for optimal healing    Infectious Disease:  Stress induced leukocytosis  Concern for pneumonia  WBC 12.7 (improving), remains afebrile, CRP improving  - CXR with left consolidation concerning for pneumonia and patient complains of cough, treated with empiric abx as below   - UA, blood cultures, and sputum sent 3/29 -- UA neg, blood cultures all NGTD  - Started empiric IV vancomycin and Zosyn for likely HAP versus alternate post-operative infection. Discontinue vanco given negative MRSA nare  - CT chest 4/3 for persistent leukocytosis -- no infectious source identified   - ID consult for persistent leukocytosis,  "4/3 -- recommended stopping IV Zosyn and monitor, if fever spike or up-trending WBC/CRP consider dental source of infection   - re-consulted ID 4/5 for up-trending white count -- recommend continuing to monitor, hold off on re-starting antibiotics for now. If WBC/CRP continues to up-trend, consider dental source or thoracentesis per ID  - driveline site without evidence of infection -- ID reviewed photos of driveline site 4/7 and agree no evidence of infection and to continue to monitor off abx  - Completed perioperative antibiotics  - Continue to monitor fever curve, CBC    Hematology:   Acute blood loss anemia and thrombocytopenia  Hgb stable; Plt stable, no signs or symptoms of active bleeding  - CBC daily    Anticoagulation:   - ASA 81 mg  - Coumadin for LVAD with a goal INR of 2-3, INR remains sub-therapeutic this AM -- re-started heparin gtt per discussion with Dr. Matias    MSK/Skin:  Sternotomy  Surgical incision  - Sternal precautions  - Incisional cares per protocol    Prophylaxis:   - Stress ulcer prophylaxis: Pantoprazole 40 mg daily  - DVT prophylaxis: warfarin, SCD    Disposition:   - Transferred to  on 3/26  - Therapies recommending discharge to home pending INR    Rounded with Dr. Stefan Ralph, PA-C  Cardiothoracic Surgery  p: [phone]        Interval History:     No acute events overnight. States pain is well managed on current regimen, sleeping poorly. Wants to go home. Ongoing pain to left ribs since CPR pre-op, bothering him more today. Breathing is stable on room air, working with IS. Tolerating diet, is passing flatus, +BM, no n/v. Denies chest pain, palpitations, dizziness, syncopal symptoms, chills, myalgias, sternal popping/clicking. Still having rib soreness.         Physical Exam:   Vital signs:  Temp: 98  F (36.7  C) Temp src: Oral BP: (!) 85/61 Pulse: 76   Resp: 20 SpO2: 95 % O2 Device: None (Room air) Oxygen Delivery: 2 LPM Height: 170.2 cm (5' 7\") Weight: 76.8 kg (169 lb 5 " "oz)  Estimated body mass index is 26.52 kg/m  as calculated from the following:    Height as of this encounter: 1.702 m (5' 7\").    Weight as of this encounter: 76.8 kg (169 lb 5 oz).    Gen: A&Ox4, NAD  Neuro: Intact no focal deficits   CV: LVAD hum  Pulm: CTA, no wheezing or rhonchi, normal breathing on RA  Abd: nondistended, normal BS, soft, nontender  Ext: trace peripheral edema  Incision: clean, dry, intact, no erythema, sternum stable  Tubes/drain sites: dressing clean and dry  LVAD dressing clean, dry, intact. Driveline site with minimal erythema and minimal serous drainage, no purulence or induration   Speed: 5200, Power: 3.7, Flow: 4.2, PI: 3.1         Data:    Imaging:  reviewed recent imaging, no acute concerns    No results found for this or any previous visit (from the past 24 hour(s)).     Labs:  Recent Labs   Lab 04/08/23  0634 04/08/23  0459 04/07/23  1355 04/07/23  0337 04/06/23  0716 04/06/23  0405 04/03/23  0730 04/03/23  0442 04/02/23  0806 04/02/23  0409   WBC  --  12.7*  --  13.3*  --  15.6*   < > 18.8*  --  17.0*   HGB  --  10.0*  --  11.0*  --  10.1*   < > 9.7*  --  9.8*   MCV  --  96  --  96  --  98   < > 96  --  96   PLT  --  616*  --  639*  --  655*   < > 546*  --  488*   INR  --  1.75*  --  1.62*  --  1.75*   < > 1.68*  --  2.00*   NA  --  132* 134* 134*   < > 133*   < > 131*  131*   < > 133*  133*   POTASSIUM  --  4.5 4.4 4.3   < > 4.0   < > 4.3  --  3.9   CHLORIDE  --  95* 95* 96*   < > 94*   < > 93*  --  95*   CO2  --  23 25 28   < > 29   < > 29  --  29   BUN  --  10.6 11.0 11.5   < > 11.0   < > 10.4  --  11.9   CR  --  0.96 1.01 0.91   < > 0.83   < > 1.16  --  1.35*   ANIONGAP  --  14 14 10   < > 10   < > 9  --  9   TONY  --  8.7 9.0 8.6   < > 8.6   < > 8.3*  --  8.3*   GLC 84 112* 120* 106*   < > 104*   < > 94   < > 84   ALBUMIN  --   --   --   --   --   --   --  2.6*  --  2.7*   PROTTOTAL  --   --   --   --   --   --   --  5.0*  --  5.1*   BILITOTAL  --   --   --   --   --   --   " --  0.7  --  0.7   ALKPHOS  --   --   --   --   --   --   --  92  --  96   ALT  --   --   --   --   --   --   --  26  --  25   AST  --   --   --   --   --   --   --  36  --  40    < > = values in this interval not displayed.      GLUCOSE:   Recent Labs   Lab 04/08/23  0634 04/08/23  0459 04/07/23  1355 04/07/23  0337 04/06/23  1211 04/06/23  0716   GLC 84 112* 120* 106* 103* 98

## 2023-04-08 NOTE — PLAN OF CARE
Goal Outcome Evaluation:      Plan of Care Reviewed With: patient    Overall Patient Progress: improving    Neuro: A&Ox4. Pleasant. Made needs known  Cardiac: Sinus rhythm. Weak pulses. HM3 LVAD.   Respiratory: Sating >95% on RA. Clear lungs. Some minor SOB with movement.   GI/: Adequate urine output.  Diet/appetite: Tolerating reg diet. 2 L fluid restriction.  Activity:  Independent up at bedside for urinal.   Pain: Complaining of significant pain in ribs and headache. Treating with PRN oxy and tylenol.   Skin: No new deficits noted.  LDA's: L PIV infusing heparin.     Plan: Continue with POC. Notify primary team with changes. Awaiting therapeutic INR before discharge home.

## 2023-04-08 NOTE — PLAN OF CARE
Neuro: A&Ox4.   Cardiac: SR.  LVAD and ICD in place.   Respiratory: Sating >95% on RA. Declines SOB  GI/: Adequate urine output via urinal. 1x BM during shift.  Diet/appetite: Tolerating reg diet. 2 L FR  Activity:  Independent with activity.  Pain: Continues to rate pain 7-10/10 to left ribs and chest. Gave PRN oxycodone with slight relief noted. Remains on scheduled tylenol.  Refuses robaxin r/t to weakness as side effect.    Skin: No new deficits noted. LVAD dressing changed.  LDA's: L PIV infusing heparin.  INR this AM 1.75 pending INR level in AM.  Goal for 2.0 above before discharge.  Remains on heparin drip infusing at 1,850 units/hour.  Recheck 10 at this evening.     Plan: Continue with POC. Notify primary team with changes

## 2023-04-08 NOTE — PROGRESS NOTES
[facility]   Cardiology II Service / Advanced Heart Failure  Daily Consult Note      Patient: Akshat Fragoso  MRN: [id number]  Admission Date: 3/15/2023  Hospital Day # 24    Assessment and Plan: Akshat Fragoso is a 57 year old male with HFrEF (EF 10% 5/2022) 2/2 NICM s/p ICD, tobacco use disorder, cannabis use, HTN, lumbar radiculopathy, CKD who presented after being found down at home. Found to have had a prolonged episode of VT with concern for cardiac arrest s/p ROSC in the field now s/p LVAD with HM3 on 3/23/23 and tricuspid annuloplasty.    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 NYHA Class III.    Fluid status: euvolemic Bumex 1 mg PO daily  RV support/rate control: digoxin 250 mcg daily, last level 1.8 (for rate control)  ACEi/ARB/ARNi/afterload reduction: lisinopril 2.5 mg daily  BB: deferred due to recent VAD  Aldosterone antagonist: spironolactone 12.5 mg daily  SGLT2i: empagliflozin 10 mg daily  SCD prophylaxis: ICD, reprogrammed device to provide ATP burst at VT threshold  MAP: 70-88  LDH trends: baseline ordered  Anticoagulation: warfarin dosing per pharmacy, dose increased yesterday, INR goal 2-3, today 1.7, remains on heparin gtt for bridging  Antiplatelet: ASA 81 mg daily    # Atrial fibrillation with RVR 3/28, resolved  Received adenosine 3/28/23 which slowed rate. Then started on amiodarone gtt.  -continue amiodarone 200 mg daily, will discussion duration    # Hypervolemic hyponatremia  Na stable 132-134. Appears euvolemic today.   -daily BMP, continue FR    # Rib pain  "2/2 chest compressions during CPR  Interfering with sleep.   - pain management per CVTS    # Mild leukocytopenia, improving  No localizing symptoms of infection. WBC improving.  - monitor      Cristy Iniguez DNP, NP-C  Advanced Heart Failure/Cardiology II Service  Pager [phone] [facility] [id number]    Patient discussed with Dr. Moreau.      35 minutes spent on the date of the encounter doing chart review, history and exam, documentation and further activities per the note    ================================================================    Subjective/24-Hr Events:   Last 24 hr care team notes reviewed. No overnight events. Reports not sleeping due to ambient noise and rib pain.     ROS:  4 point ROS including respiratory, CV, GI and  (other than that noted in the HPI) is negative.     Medications: Reviewed in EPIC.     Physical Exam:   BP (!) 85/61   Pulse 76   Temp 98  F (36.7  C) (Oral)   Resp 20   Ht 1.702 m (5' 7\")   Wt 76.8 kg (169 lb 5 oz)   SpO2 95%   BMI 26.52 kg/m      GENERAL: Appears comfortable, in no distress .  HEENT: Eye symmetrical, no discharge or icterus bilaterally. Mucous membranes moist and without lesions.  NECK: Supple, JVD at clavicle.   CV: +mechanical LVAD   RESPIRATORY: Respirations regular, even, and unlabored. Lungs CTA throughout.   GI: Soft and non distended with normoactive bowel sounds present in all quadrants. No tenderness, rebound, guarding.   EXTREMITIES: No peripheral edema. Non pulsatile.   NEUROLOGIC: Alert and oriented x 3. No focal deficits.   MUSCULOSKELETAL: No joint swelling or tenderness.   SKIN: No jaundice. No rashes or lesions. Sternum stable.     Labs:  CMP  Recent Labs   Lab 04/08/23  0634 04/08/23  0459 04/07/23  1355 04/07/23  0454 04/07/23  0337 04/06/23  1211 04/06/23  0716 04/06/23  0405 04/05/23  0639 04/05/23  0414 04/03/23  0730 04/03/23  0442 04/02/23  0806 04/02/23  0409   NA  --  132* 134*  --  134* 134*  --  133*   < > 133*   < > 131*  131*   < > " 133*  133*   POTASSIUM  --  4.5 4.4  --  4.3 4.2  --  4.0   < > 4.1   < > 4.3  --  3.9   CHLORIDE  --  95* 95*  --  96* 96*  --  94*   < > 95*   < > 93*  --  95*   CO2  --  23 25  --  28 30*  --  29   < > 30*   < > 29  --  29   ANIONGAP  --  14 14  --  10 8  --  10   < > 8   < > 9  --  9   GLC 84 112* 120*  --  106* 103*   < > 104*   < > 87   < > 94   < > 84   BUN  --  10.6 11.0  --  11.5 11.1  --  11.0   < > 9.0   < > 10.4  --  11.9   CR  --  0.96 1.01  --  0.91 0.84  --  0.83   < > 0.92   < > 1.16  --  1.35*   GFRESTIMATED  --  >90 88  --  >90 >90  --  >90   < > >90   < > 74  --  62   TONY  --  8.7 9.0  --  8.6 8.9  --  8.6   < > 8.6   < > 8.3*  --  8.3*   MAG  --  1.9  --  2.0  --   --   --  1.9  --  1.9   < > 1.9  --  2.0   PHOS  --  4.2  --   --  3.8  --   --  3.3  --   --   --  3.3  --  3.1   PROTTOTAL  --   --   --   --   --   --   --   --   --   --   --  5.0*  --  5.1*   ALBUMIN  --   --   --   --   --   --   --   --   --   --   --  2.6*  --  2.7*   BILITOTAL  --   --   --   --   --   --   --   --   --   --   --  0.7  --  0.7   ALKPHOS  --   --   --   --   --   --   --   --   --   --   --  92  --  96   AST  --   --   --   --   --   --   --   --   --   --   --  36  --  40   ALT  --   --   --   --   --   --   --   --   --   --   --  26  --  25    < > = values in this interval not displayed.       CBC  Recent Labs   Lab 04/08/23 0459 04/07/23 0337 04/06/23 0405 04/05/23  0750   WBC 12.7* 13.3* 15.6* 17.3*   RBC 3.46* 3.70* 3.43* 3.72*   HGB 10.0* 11.0* 10.1* 11.0*   HCT 33.3* 35.6* 33.6* 36.1*   MCV 96 96 98 97   MCH 28.9 29.7 29.4 29.6   MCHC 30.0* 30.9* 30.1* 30.5*   RDW 15.2* 15.0 15.0 15.3*   * 639* 655* 685*       INR  Recent Labs   Lab 04/08/23 0459 04/07/23 0337 04/06/23 0405 04/05/23  0414   INR 1.75* 1.62* 1.75* 1.70*

## 2023-04-08 NOTE — PROCEDURES
The patient's HeartMate LVAD was interrogated 4/8/2023  * Speed 5200 rpm   * Pulsatility index 3.1-3.4   * Power 3.6Watts   * Flow 4.2 L/minute   Fluid status: euvolemic   Alarms were reviewed, and notable for no events.   The driveline exit site was inspected, dressing cdi.   All external components were inspected and showed no evidence of damage or malfunction, none replaced.   No changes to VAD settings made

## 2023-04-09 ENCOUNTER — CARE COORDINATION (OUTPATIENT)
Dept: CARDIOLOGY | Facility: CLINIC | Age: 58
End: 2023-04-09
Payer: COMMERCIAL

## 2023-04-09 VITALS
RESPIRATION RATE: 20 BRPM | SYSTOLIC BLOOD PRESSURE: 111 MMHG | HEIGHT: 67 IN | BODY MASS INDEX: 25.78 KG/M2 | OXYGEN SATURATION: 95 % | TEMPERATURE: 98.1 F | WEIGHT: 164.24 LBS | DIASTOLIC BLOOD PRESSURE: 71 MMHG | HEART RATE: 81 BPM

## 2023-04-09 DIAGNOSIS — B37.0 THRUSH: Primary | ICD-10-CM

## 2023-04-09 LAB
ANION GAP SERPL CALCULATED.3IONS-SCNC: 11 MMOL/L (ref 7–15)
BUN SERPL-MCNC: 11.3 MG/DL (ref 6–20)
CALCIUM SERPL-MCNC: 9.3 MG/DL (ref 8.6–10)
CHLORIDE SERPL-SCNC: 95 MMOL/L (ref 98–107)
CREAT SERPL-MCNC: 0.94 MG/DL (ref 0.67–1.17)
DEPRECATED HCO3 PLAS-SCNC: 27 MMOL/L (ref 22–29)
ERYTHROCYTE [DISTWIDTH] IN BLOOD BY AUTOMATED COUNT: 15.1 % (ref 10–15)
GFR SERPL CREATININE-BSD FRML MDRD: >90 ML/MIN/1.73M2
GLUCOSE BLDC GLUCOMTR-MCNC: 102 MG/DL (ref 70–99)
GLUCOSE SERPL-MCNC: 102 MG/DL (ref 70–99)
HCT VFR BLD AUTO: 36.1 % (ref 40–53)
HGB BLD-MCNC: 11 G/DL (ref 13.3–17.7)
INR PPP: 2.17 (ref 0.85–1.15)
MCH RBC QN AUTO: 28.9 PG (ref 26.5–33)
MCHC RBC AUTO-ENTMCNC: 30.5 G/DL (ref 31.5–36.5)
MCV RBC AUTO: 95 FL (ref 78–100)
PLATELET # BLD AUTO: 595 10E3/UL (ref 150–450)
POTASSIUM SERPL-SCNC: 4.4 MMOL/L (ref 3.4–5.3)
RBC # BLD AUTO: 3.8 10E6/UL (ref 4.4–5.9)
SODIUM SERPL-SCNC: 133 MMOL/L (ref 136–145)
UFH PPP CHRO-ACNC: 0.5 IU/ML
WBC # BLD AUTO: 13.1 10E3/UL (ref 4–11)

## 2023-04-09 PROCEDURE — 85610 PROTHROMBIN TIME: CPT | Performed by: STUDENT IN AN ORGANIZED HEALTH CARE EDUCATION/TRAINING PROGRAM

## 2023-04-09 PROCEDURE — 250N000013 HC RX MED GY IP 250 OP 250 PS 637: Performed by: STUDENT IN AN ORGANIZED HEALTH CARE EDUCATION/TRAINING PROGRAM

## 2023-04-09 PROCEDURE — 99232 SBSQ HOSP IP/OBS MODERATE 35: CPT | Mod: 25 | Performed by: STUDENT IN AN ORGANIZED HEALTH CARE EDUCATION/TRAINING PROGRAM

## 2023-04-09 PROCEDURE — 85520 HEPARIN ASSAY: CPT | Performed by: INTERNAL MEDICINE

## 2023-04-09 PROCEDURE — 80048 BASIC METABOLIC PNL TOTAL CA: CPT

## 2023-04-09 PROCEDURE — 250N000011 HC RX IP 250 OP 636: Performed by: PHYSICIAN ASSISTANT

## 2023-04-09 PROCEDURE — 85027 COMPLETE CBC AUTOMATED: CPT | Performed by: PHYSICIAN ASSISTANT

## 2023-04-09 PROCEDURE — 250N000013 HC RX MED GY IP 250 OP 250 PS 637

## 2023-04-09 PROCEDURE — 36415 COLL VENOUS BLD VENIPUNCTURE: CPT | Performed by: PHYSICIAN ASSISTANT

## 2023-04-09 PROCEDURE — 93750 INTERROGATION VAD IN PERSON: CPT | Performed by: STUDENT IN AN ORGANIZED HEALTH CARE EDUCATION/TRAINING PROGRAM

## 2023-04-09 RX ORDER — NYSTATIN 100000/ML
500000 SUSPENSION, ORAL (FINAL DOSE FORM) ORAL 4 TIMES DAILY
Qty: 100 ML | Refills: 0 | Status: ON HOLD | OUTPATIENT
Start: 2023-04-09 | End: 2023-08-07

## 2023-04-09 RX ORDER — POLYETHYLENE GLYCOL 3350 17 G/17G
17 POWDER, FOR SOLUTION ORAL DAILY
Qty: 510 G | Refills: 0 | Status: SHIPPED | OUTPATIENT
Start: 2023-04-09 | End: 2023-07-25

## 2023-04-09 RX ORDER — ACETAMINOPHEN 325 MG/1
650 TABLET ORAL EVERY 4 HOURS PRN
Qty: 100 TABLET | Refills: 0 | Status: SHIPPED | OUTPATIENT
Start: 2023-04-09

## 2023-04-09 RX ORDER — METHOCARBAMOL 500 MG/1
500 TABLET, FILM COATED ORAL 4 TIMES DAILY
Qty: 60 TABLET | Refills: 0 | Status: SHIPPED | OUTPATIENT
Start: 2023-04-09 | End: 2023-07-25

## 2023-04-09 RX ORDER — OXYCODONE HYDROCHLORIDE 5 MG/1
5 TABLET ORAL EVERY 6 HOURS PRN
Qty: 50 TABLET | Refills: 0 | Status: SHIPPED | OUTPATIENT
Start: 2023-04-09 | End: 2023-04-09

## 2023-04-09 RX ORDER — AMIODARONE HYDROCHLORIDE 200 MG/1
200 TABLET ORAL DAILY
Qty: 30 TABLET | Refills: 0 | Status: SHIPPED | OUTPATIENT
Start: 2023-04-10 | End: 2023-04-25

## 2023-04-09 RX ORDER — DIGOXIN 250 MCG
250 TABLET ORAL DAILY
Qty: 30 TABLET | Refills: 0 | Status: SHIPPED | OUTPATIENT
Start: 2023-04-10 | End: 2023-04-25

## 2023-04-09 RX ORDER — BUMETANIDE 1 MG/1
1 TABLET ORAL DAILY
Qty: 30 TABLET | Refills: 0 | Status: SHIPPED | OUTPATIENT
Start: 2023-04-10 | End: 2023-04-25

## 2023-04-09 RX ORDER — OXYCODONE HYDROCHLORIDE 5 MG/1
5-10 TABLET ORAL EVERY 6 HOURS PRN
Qty: 60 TABLET | Refills: 0 | Status: SHIPPED | OUTPATIENT
Start: 2023-04-09 | End: 2023-04-18

## 2023-04-09 RX ORDER — NYSTATIN 100000/ML
500000 SUSPENSION, ORAL (FINAL DOSE FORM) ORAL 4 TIMES DAILY
Qty: 100 ML | Refills: 0 | Status: SHIPPED | OUTPATIENT
Start: 2023-04-09 | End: 2023-04-09

## 2023-04-09 RX ORDER — LISINOPRIL 2.5 MG/1
2.5 TABLET ORAL DAILY
Qty: 30 TABLET | Refills: 0 | Status: SHIPPED | OUTPATIENT
Start: 2023-04-10 | End: 2023-04-12

## 2023-04-09 RX ORDER — WARFARIN SODIUM 5 MG/1
TABLET ORAL
Qty: 60 TABLET | Refills: 0 | Status: SHIPPED | OUTPATIENT
Start: 2023-04-09 | End: 2023-06-01

## 2023-04-09 RX ORDER — SPIRONOLACTONE 25 MG/1
12.5 TABLET ORAL DAILY
Qty: 30 TABLET | Refills: 0 | Status: SHIPPED | OUTPATIENT
Start: 2023-04-10 | End: 2023-04-25

## 2023-04-09 RX ORDER — PANTOPRAZOLE SODIUM 40 MG/1
40 TABLET, DELAYED RELEASE ORAL
Qty: 30 TABLET | Refills: 0 | Status: SHIPPED | OUTPATIENT
Start: 2023-04-10 | End: 2023-05-09

## 2023-04-09 RX ORDER — ASPIRIN 81 MG/1
81 TABLET, CHEWABLE ORAL DAILY
Qty: 30 TABLET | Refills: 0 | Status: SHIPPED | OUTPATIENT
Start: 2023-04-10 | End: 2023-04-25

## 2023-04-09 RX ORDER — AMOXICILLIN 250 MG
2 CAPSULE ORAL 2 TIMES DAILY PRN
Qty: 40 TABLET | Refills: 0 | Status: SHIPPED | OUTPATIENT
Start: 2023-04-09 | End: 2023-07-25

## 2023-04-09 RX ORDER — GABAPENTIN 300 MG/1
300 CAPSULE ORAL 3 TIMES DAILY
Qty: 90 CAPSULE | Refills: 0 | Status: SHIPPED | OUTPATIENT
Start: 2023-04-09 | End: 2023-05-22

## 2023-04-09 RX ADMIN — DIGOXIN 250 MCG: 125 TABLET ORAL at 08:37

## 2023-04-09 RX ADMIN — LISINOPRIL 2.5 MG: 2.5 TABLET ORAL at 08:37

## 2023-04-09 RX ADMIN — ACETAMINOPHEN 975 MG: 325 TABLET, FILM COATED ORAL at 08:37

## 2023-04-09 RX ADMIN — OXYCODONE HYDROCHLORIDE 10 MG: 10 TABLET ORAL at 00:36

## 2023-04-09 RX ADMIN — ASPIRIN 81 MG 81 MG: 81 TABLET ORAL at 08:38

## 2023-04-09 RX ADMIN — GABAPENTIN 300 MG: 300 CAPSULE ORAL at 08:37

## 2023-04-09 RX ADMIN — OXYCODONE HYDROCHLORIDE 10 MG: 10 TABLET ORAL at 04:31

## 2023-04-09 RX ADMIN — HEPARIN SODIUM AND DEXTROSE 1850 UNITS/HR: 10000; 5 INJECTION INTRAVENOUS at 04:29

## 2023-04-09 RX ADMIN — PANTOPRAZOLE SODIUM 40 MG: 40 TABLET, DELAYED RELEASE ORAL at 08:38

## 2023-04-09 RX ADMIN — SPIRONOLACTONE 12.5 MG: 25 TABLET, FILM COATED ORAL at 08:38

## 2023-04-09 RX ADMIN — BUMETANIDE 1 MG: 1 TABLET ORAL at 08:38

## 2023-04-09 RX ADMIN — AMIODARONE HYDROCHLORIDE 200 MG: 200 TABLET ORAL at 08:38

## 2023-04-09 RX ADMIN — ACETAMINOPHEN 975 MG: 325 TABLET, FILM COATED ORAL at 00:34

## 2023-04-09 RX ADMIN — EMPAGLIFLOZIN 10 MG: 10 TABLET, FILM COATED ORAL at 08:37

## 2023-04-09 RX ADMIN — OXYCODONE HYDROCHLORIDE 10 MG: 10 TABLET ORAL at 08:45

## 2023-04-09 ASSESSMENT — ACTIVITIES OF DAILY LIVING (ADL)
ADLS_ACUITY_SCORE: 20

## 2023-04-09 NOTE — DISCHARGE SUMMARY
[facility], [place]   Cardiothoracic Surgery Hospital Discharge Summary       Akshat Fragoso MRN# [id number]   YOB: 1965 Age: 57 year old     Admitting Physician:  Abbie Hilton MD  Discharge Physician:  Mike Ralph PA-C   Primary Care Physician:        Ghulam Lemus     Date of Admission:  3/15/2023    Date of Discharge:  4/9/2023 12:07 PM         Primary Diagnoses:   1. Acute on chronic HFrEF (EF 10%) 2/2 NICM - s/p LVAD placement (HM3)  2. VT out of hospital cardiac arrest  3. Moderate TR - s/p TV repair  4. Hx of atrial fibrillation  5. Cardiogenic shock, resolved          Secondary Diagnosis:   1. Stress induced leukocytosis  2. Possible hospital acquired pneumonia  3. Acute blood loss anemia         Procedures:   Date: 3/23/2023    Surgeon: Dr. Curt Matias and Dr. Dakota Waller  1) Implant of Heartmate III left ventricular assist device (intracorporeal left ventricular assist device)  2) Tricuspid valve annuloplasty with 32 mm MC3 partial ring  3) Patent foramen ovale closure          Brief History of Illness:   Akshat Fragoso is a 57 year old male with a past medical history of HFrEF (10%) secondary to NICM (suspected viral etiology) s/p ICD, chronic tobacco/marijuana use, COPD, HTN, CKD stage III who was admitted to [facility] following a VT/VF arrest. He underwent chest compressions during the event with a short down-time. ROSC was obtained within 6 minutes. He was evaluated for placement of an LVAD and completed an extensive pre-operative work-up and on multi-disciplinary discussion, was determined to be an appropriate candidate for placement of the Heartmate III LVAD.           Post-operative Hospital Course:   Akshat Fragoso is a 57 year old male who on 3/23/2023 underwent the above-named procedures.  Patient tolerated the operation well and was admitted to the CVICU post-operatively.  Patient was extubated on POD # 1.  Pressors were weaned off as able.  Patient's ICU stay was unremarkable. Patient was transferred to the surgical floor on POD # 3.     Patient continued to improve post-operatively. Patient was started on ASA 81 mg daily, lisinopril 2.5 mg daily, digoxin and spironolactone 12.5 mg daily. He developed post-operative atrial fibrillation with RVR on 3/28. Started on amiodarone gtt and converted to NSR. He was transitioned to PO taper and decreased to 200 mg daily on 4/2. He was started on coumadin for his LVAD and bridged with heparin. INR was therapeutic at discharge.     Patient was diuresed with intermittent boluses of IV bumex to his pre-operative weight and transitioned to bumex 1 mg PO daily on 4/6. Chest tubes were removed when drainage decreased and follow-up CXR was negative for any significant pneumothorax.    Patient had a post-operative leukocytosis. CXR showed a left sided consolidation concerning for pneumonia. He was started on empiric vanc/zosyn from 3/29 to 4/4. ID was consulted. Cultures remained negative and CT chest 4/3 showed no source of infection. ID consulted and recommending watching off antibiotics. WBC was trending down/stable at time of discharge and patient remained afebrile. He did have scan drainage from his driveline that was serous. No erythema or purulence, reviewed with ID and will hold off on antibiotics. Will check CBC in 3 days.      Post-operative pain control included IV dilaudid, PO oxycodone, robaxin, gabapentin and tylenol and will be discharged on oxycodone, robaxin, gabapentin and tylenol.     Prior to discharge, patient's pain was controlled well, they were able to perform most ADLs and ambulate without difficulty, and had full return of bowel and bladder function.  On April 9, 2023, patient was Discharged to home in stable condition.      Day of Discharge Exam   Vitals:  Temp:  [98  F (36.7  C)-99.3  F (37.4  C)] 98  F (36.7  C)  Pulse:  [] 78  Resp:  [20] 20  BP: (111)/(71) 111/71  SpO2:  [95 %-98 %]  98 %  Wt: 164 lbs 3.88 oz, 74 kg   LVAD: Flow 4.3, PI 3.9, Speed 5200, Power 3.6, no alarms or power spikes  Physical Exam:   Gen: A&OX3 NAD, conversational  CV: VAD hum present  Pulm: Lungs CTA, no rhonchi or wheezes  Abd: +BS, Soft, nondistended, NTTP  Ext: No lower extremity edema  Neuro: CN II-XII intact, no focal deficits  Incision: Clean, dry, intact, no erythema  Chest Tube sites:  Dressings clean and dry  Lines: Drive line dressing clean and dry    Labs:   [facility] Labs   Lab 04/09/23  0553 04/09/23  0547 04/08/23  1135 04/08/23  0634 04/08/23  0459 04/07/23  1355 04/07/23  0454 04/07/23  0337 04/06/23  0716 04/06/23  0405 04/05/23  0639 04/05/23  0414 04/03/23  0730 04/03/23  0442   NA  --  133* 134*  --  132* 134*  --  134*   < > 133*   < > 133*   < > 131*  131*   POTASSIUM  --  4.4 4.7  --  4.5 4.4  --  4.3   < > 4.0   < > 4.1   < > 4.3   CHLORIDE  --  95* 94*  --  95* 95*  --  96*   < > 94*   < > 95*   < > 93*   CO2  --  27 26  --  23 25  --  28   < > 29   < > 30*   < > 29   BUN  --  11.3 10.5  --  10.6 11.0  --  11.5   < > 11.0   < > 9.0   < > 10.4   CR  --  0.94 0.94  --  0.96 1.01  --  0.91   < > 0.83   < > 0.92   < > 1.16   * 102* 102* 84 112* 120*  --  106*   < > 104*   < > 87   < > 94   MAG  --   --   --   --  1.9  --  2.0  --   --  1.9  --  1.9   < > 1.9   PHOS  --   --   --   --  4.2  --   --  3.8  --  3.3  --   --   --  3.3    < > = values in this interval not displayed.     CBC  Recent Labs   Lab 04/09/23  0547 04/08/23 0459 04/07/23 0337 04/06/23  0405   WBC 13.1* 12.7* 13.3* 15.6*   RBC 3.80* 3.46* 3.70* 3.43*   HGB 11.0* 10.0* 11.0* 10.1*   HCT 36.1* 33.3* 35.6* 33.6*   MCV 95 96 96 98   MCH 28.9 28.9 29.7 29.4   MCHC 30.5* 30.0* 30.9* 30.1*   RDW 15.1* 15.2* 15.0 15.0   * 616* 639* 655*     INR  Recent Labs   Lab 04/09/23  0547 04/08/23 0459 04/07/23 0337 04/06/23  0405   INR 2.17* 1.75* 1.62* 1.75*      Hepatic Panel   Recent Labs   Lab 04/03/23  0442   AST 36   ALT 26    ALKPHOS 92   BILITOTAL 0.7   ALBUMIN 2.6*            Imaging Studies:   Echocardiogram 3/28/23:  Technically difficult study.Extremely poor acoustic windows.  Limited information was obtained during study.  LVAD cannula was seen in the expected anatomic position in the LV apex.  HM3 at 5200RPM.  Septum normal.  LVIDd 56mm.  Aortic valve not well visualized. No AI.  Normal outflow velocity.  Global right ventricular function is moderately to severely reduced.  Small pericardial effusion with organizing material in pericardial cavity.    CT Chest 4/3/23  Chest:   Postsurgical changes of the chest. Median sternotomy wires are intact.  LVAD terminating over the left ventricle. Left chest wall implantable  cardiac defibrillator with leads terminating over the right atrium and  right ventricle.     Thyroid gland is unremarkable. Heart size is normal. No pericardial  effusion. No significant coronary artery calcifications. Thoracic  aorta and pulmonary arteries are normal in caliber with conventional  three-vessel branching pattern of the aortic arch. Esophagus is  unremarkable.     Mediastinal adenopathy.     Trachea and central airways are clear. Subsegmental atelectasis in the  lower lungs. Small/moderate left pleural effusion. Scattered calcified  granulomas. No pneumothorax.     Abdomen: Examination of the upper abdomen is limited. Bilateral  adrenal gland thickening, unchanged.     Bones: No suspicious osseous lesion. Chronic wedging of the  midthoracic spine.       Soft Tissues: No suspicious mass.                                                                      IMPRESSION:   1.  Small left pleural effusion with adjacent compressive atelectasis.  2.  Mediastinal adenopathy, likely reactive      Final Pathology/Culture Result:   Surgical Pathology 3/23/23:  A. HEART, MYOCARDIAL CORE:  - Myocardium with mild hypertrophic changes and vascular congestion  - No evidence of fibrosis    Blood Cultures 3/19, 3/20,  3/29 NGTD         Consultations:   1. OT/PT  2. Cardiology  3. ID         Medications Prior to Admission:     Medications Prior to Admission   Medication Sig Dispense Refill Last Dose     albuterol (PROAIR HFA/PROVENTIL HFA/VENTOLIN HFA) 108 (90 Base) MCG/ACT inhaler INHALE 1 TO 2 PUFFS INTO THE LUNGS EVERY 4 HOURS AS NEEDED FOR SHORTNESS OF BREATH OR DIFFICULT BREATHING OR WHEEZING 18 g 11 Unknown     aspirin (ASA) 81 MG EC tablet Take 1 tablet by mouth daily   Unknown     carvedilol (COREG) 12.5 MG tablet Take 1 tablet (12.5 mg) by mouth 2 times daily (with meals) Please make a follow up appointment for further refills. 180 tablet 1 Unknown     digoxin (LANOXIN) 125 MCG tablet Take 125 mcg by mouth daily   Unknown     furosemide (LASIX) 20 MG tablet Take 1 tablet (20 mg) by mouth 2 times daily (Only take if weight is up by 3 LBS) (Patient taking differently: Take 20 mg by mouth every other day) 180 tablet 1 Unknown     lisinopril (ZESTRIL) 5 MG tablet Take 1 tablet (5 mg) by mouth 2 times daily Please make a follow up appointment for further refills. 180 tablet 0 Unknown     nicotine (NICODERM CQ) 7 MG/24HR 24 hr patch Place 1 patch onto the skin every 24 hours   Unknown     sildenafil (VIAGRA) 50 MG tablet TAKE 1 TABLET BY MOUTH ONCE DAILY AS NEEDED FOR ERECTILE DYSFUNCTION 20 tablet 0 Unknown     spironolactone (ALDACTONE) 25 MG tablet Take 0.5 tablets (12.5 mg) by mouth daily 45 tablet 1 Unknown           Discharge Medications:        Review of your medicines      START taking      Dose / Directions   acetaminophen 325 MG tablet  Commonly known as: TYLENOL  Used for: LVAD (left ventricular assist device) present (H)      Dose: 650 mg  Take 2 tablets (650 mg) by mouth every 4 hours as needed for other (For optimal non-opioid multimodal pain management to improve pain control.)  Quantity: 100 tablet  Refills: 0     amiodarone 200 MG tablet  Commonly known as: PACERONE  Used for: LVAD (left ventricular assist  device) present (H)      Dose: 200 mg  Start taking on: April 10, 2023  Take 1 tablet (200 mg) by mouth daily  Quantity: 30 tablet  Refills: 0     aspirin 81 MG chewable tablet  Commonly known as: ASA  Used for: LVAD (left ventricular assist device) present (H)  Replaces: aspirin 81 MG EC tablet      Dose: 81 mg  Start taking on: April 10, 2023  1 tablet (81 mg) by Oral or NG Tube route daily  Quantity: 30 tablet  Refills: 0     bumetanide 1 MG tablet  Commonly known as: BUMEX  Used for: LVAD (left ventricular assist device) present (H)      Dose: 1 mg  Start taking on: April 10, 2023  Take 1 tablet (1 mg) by mouth daily  Quantity: 30 tablet  Refills: 0     empagliflozin 10 MG Tabs tablet  Commonly known as: JARDIANCE  Used for: LVAD (left ventricular assist device) present (H)      Dose: 10 mg  Start taking on: April 10, 2023  Take 1 tablet (10 mg) by mouth daily  Quantity: 90 tablet  Refills: 1     gabapentin 300 MG capsule  Commonly known as: NEURONTIN  Used for: LVAD (left ventricular assist device) present (H)      Dose: 300 mg  1 capsule (300 mg) by Oral or Feeding Tube route 3 times daily  Quantity: 90 capsule  Refills: 0     methocarbamol 500 MG tablet  Commonly known as: ROBAXIN  Used for: LVAD (left ventricular assist device) present (H)      Dose: 500 mg  Take 1 tablet (500 mg) by mouth 4 times daily  Quantity: 60 tablet  Refills: 0     oxyCODONE 5 MG tablet  Commonly known as: ROXICODONE  Used for: LVAD (left ventricular assist device) present (H)      Dose: 5-10 mg  Take 1-2 tablets (5-10 mg) by mouth every 6 hours as needed for severe pain  Quantity: 60 tablet  Refills: 0     pantoprazole 40 MG EC tablet  Commonly known as: PROTONIX  Used for: LVAD (left ventricular assist device) present (H)      Dose: 40 mg  Start taking on: April 10, 2023  Take 1 tablet (40 mg) by mouth every morning (before breakfast)  Quantity: 30 tablet  Refills: 0     polyethylene glycol 17 GM/Dose powder  Commonly known as:  MIRALAX  Used for: LVAD (left ventricular assist device) present (H)      Dose: 17 g  Take 17 g by mouth daily  Quantity: 510 g  Refills: 0     senna-docusate 8.6-50 MG tablet  Commonly known as: SENOKOT-S/PERICOLACE  Used for: LVAD (left ventricular assist device) present (H)      Dose: 2 tablet  Take 2 tablets by mouth 2 times daily as needed for constipation  Quantity: 40 tablet  Refills: 0     warfarin ANTICOAGULANT 5 MG tablet  Commonly known as: COUMADIN  Used for: LVAD (left ventricular assist device) present (H)      Take 1 tablet (5 mg) by mouth at 6 pm on 4/9 and have INR check 4/10 and have dose adjusted  Quantity: 60 tablet  Refills: 0        CONTINUE these medicines which may have CHANGED, or have new prescriptions. If we are uncertain of the size of tablets/capsules you have at home, strength may be listed as something that might have changed.      Dose / Directions   digoxin 250 MCG tablet  Commonly known as: LANOXIN  This may have changed:     medication strength    how much to take  Used for: LVAD (left ventricular assist device) present (H)      Dose: 250 mcg  Start taking on: April 10, 2023  Take 1 tablet (250 mcg) by mouth daily  Quantity: 30 tablet  Refills: 0     lisinopril 2.5 MG tablet  Commonly known as: ZESTRIL  This may have changed:     medication strength    how much to take    when to take this    additional instructions  Used for: LVAD (left ventricular assist device) present (H)      Dose: 2.5 mg  Start taking on: April 10, 2023  Take 1 tablet (2.5 mg) by mouth daily  Quantity: 30 tablet  Refills: 0        CONTINUE these medicines which have NOT CHANGED      Dose / Directions   spironolactone 25 MG tablet  Commonly known as: ALDACTONE  Used for: LVAD (left ventricular assist device) present (H)      Dose: 12.5 mg  Start taking on: April 10, 2023  Take 0.5 tablets (12.5 mg) by mouth daily  Quantity: 30 tablet  Refills: 0        STOP taking    albuterol 108 (90 Base) MCG/ACT  inhaler  Commonly known as: PROAIR HFA/PROVENTIL HFA/VENTOLIN HFA        aspirin 81 MG EC tablet  Commonly known as: ASA  Replaced by: aspirin 81 MG chewable tablet        carvedilol 12.5 MG tablet  Commonly known as: COREG        furosemide 20 MG tablet  Commonly known as: LASIX        nicotine 7 MG/24HR 24 hr patch  Commonly known as: NICODERM CQ        sildenafil 50 MG tablet  Commonly known as: VIAGRA              Where to get your medicines      These medications were sent to [facility] - [place], [place] - [address]  [address] [address]    Phone: [phone]     acetaminophen 325 MG tablet    amiodarone 200 MG tablet    aspirin 81 MG chewable tablet    bumetanide 1 MG tablet    digoxin 250 MCG tablet    empagliflozin 10 MG Tabs tablet    gabapentin 300 MG capsule    lisinopril 2.5 MG tablet    methocarbamol 500 MG tablet    oxyCODONE 5 MG tablet    pantoprazole 40 MG EC tablet    polyethylene glycol 17 GM/Dose powder    senna-docusate 8.6-50 MG tablet    spironolactone 25 MG tablet    warfarin ANTICOAGULANT 5 MG tablet              Discharge Instructions and Follow-Up:   Avoid lifting anything greater than ten pounds for 6 weeks after surgery and then less than 20 pounds for an additional 6 weeks.    No driving for 4 weeks after surgery or while on pain medication. If they had a minimally invasive procedure, they may drive after three weeks if not taking pain medication.      Avoid strenuous activities such as bowling, vacuuming, raking, shoveling, golf or tennis for 12 weeks after your surgery. Splint chest incision by hugging a pillow or bringing arms across chest when coughing or sneezing. Avoid pushing off with arms when getting up for the first month if sternum was opened.    Shower or wash incisions daily with soap and water (or as instructed), pat dry. Watch for signs of infection: increased redness, tenderness, warmth or any drainage that appears  infected (pus like) or is persistent.  Also a temperature > 100.5 F or chills. Call surgeon or primary care provider's office immediately. Remove any skin glue left on incisions after 10 days. This will not affect the incision and can speed up healing.    Avoid sitting for prolonged periods of time, try to walk every hour during the day. If patient has a leg incision, patient should elevate leg often when not walking.    Check weight when arriving at home from the hospital and continue to check it daily through recovery for at least a month. Patient instructed to call with any weight gain more than 3 pounds overnight or 5 pounds in a week.     We recommend using stool softeners while using narcotics for pain.      DENTAL VISITS AFTER SURGERY  If a heart valve was repaired or replaced, we do not recommend having any dental work done for 6 months and we recommend taking an antibiotic prior to dental visits from now on.  Patient is to notify their dentist before any procedure for the proper treatment needed. The antibiotic is taken by mouth one hour prior to visit. This includes routine cleanings.    POST-OPERATIVE CLINIC VISITS  Follow-up will be arranged by LVAD coordinator. INR check Monday 4/10, labs and cardiology visit Wed 4/12      [facility]   Cardiothoracic Surgery  Office phone: [phone]  Office fax: [phone]       CC:jaison Lemus [place]

## 2023-04-09 NOTE — PROGRESS NOTES
D: Pt paged to report a white tongue and throat and mild throat discomfort. Pt does not have a primary care MD at this time.  I:  Discussed symptoms with Dr. Moreau.  Nystatin swish and spit ordered for 5 days.  Pt advised via phone  A:  Sore, white throat  P: Pt verbalized understanding of the instructions given.  Will call VAD coordinator with further needs and questions.

## 2023-04-09 NOTE — PROGRESS NOTES
DISCHARGE                           4/9/2023 12:07 PM  ----------------------------------------------------------------------------  Discharged to: Home  Via: private transportation  Accompanied by: Family and staff memeber  Discharge Instructions:  Diet, activity, medications, follow up appointments, when to call the MD, aftercare instructions.  Prescriptions: To be filled by inpatient pharmacy; medication list reviewed & sent with pt  Follow Up Appointments: arranged; information given  Belongings: All sent with pt  IV: d/c'd  Telemetry: d/c'd  Pt exhibits understanding of above discharge instructions; all questions answered.    Discharge Paperwork: Signed, copied, and sent home with patient.

## 2023-04-09 NOTE — PROGRESS NOTES
[facility]   Cardiology II Service / Advanced Heart Failure  Daily Consult Note      Patient: Akshat Fragoso  MRN: [id number]  Admission Date: 3/15/2023  Hospital Day # 25    Assessment and Plan: Akshat Fragoso is a 57 year old male with HFrEF (EF 10% 5/2022) 2/2 NICM s/p ICD, tobacco use disorder, cannabis use, HTN, lumbar radiculopathy, CKD who presented after being found down at home. Found to have had a prolonged episode of VT with concern for cardiac arrest s/p ROSC in the field now s/p LVAD with HM3 on 3/23/23 and tricuspid annuloplasty.    Recommendations:  - INR now at therapeutic range and can discharge, from our standpoint  - Will send f/u message to VAD coordinator and outpatient cardiologist for follow up   - OK to send out on amiodarone 200 mg daily   - Cards2 will sign off. 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 NYHA Class III.    Fluid status: euvolemic Bumex 1 mg PO daily  RV support/rate control: digoxin 250 mcg daily, last level 1.8 (for rate control)  ACEi/ARB/ARNi/afterload reduction: lisinopril 2.5 mg daily  BB: deferred due to recent VAD  Aldosterone antagonist: spironolactone 12.5 mg daily  SGLT2i: empagliflozin 10 mg daily  SCD prophylaxis: ICD, reprogrammed device to provide ATP burst at VT threshold  MAP: 70-88  LDH trends: baseline ordered  Anticoagulation: warfarin dosing per pharmacy w/ heparin bridge while waiting to achieve INR goal 2-3. Today, INR now at goal at 2.17.   Antiplatelet: ASA 81 mg daily    # Atrial fibrillation with RVR 3/28, resolved  Received adenosine 3/28/23 which slowed rate. Then started on amiodarone gtt.  -continue  "amiodarone 200 mg daily on discharge. Outpatient cardiologist to determine final duration on f/u    # Hypervolemic hyponatremia  Na stable 132-134. Appears euvolemic today.   -daily BMP, continue FR    # Rib pain 2/2 chest compressions during CPR  Interfering with sleep.   - pain management per CVTS    # Mild leukocytopenia, improving  No localizing symptoms of infection. WBC improving.  - monitor    Cards2 will sign off for now. Please do not hesitate to reach out with any questions.     Jossie Leon, DO  Internal Medicine, PGY-I    Patient discussed with Dr. Moreau.      35 minutes spent on the date of the encounter doing chart review, history and exam, documentation and further activities per the note    ================================================================    Subjective/24-Hr Events:   Last 24 hr care team notes reviewed. No overnight events. Feeling well.     ROS:  4 point ROS including respiratory, CV, GI and  (other than that noted in the HPI) is negative.     Medications: Reviewed in EPIC.     Physical Exam:   /71 (BP Location: Right arm)   Pulse 81   Temp 98  F (36.7  C) (Oral)   Resp 20   Ht 1.702 m (5' 7\")   Wt 74.5 kg (164 lb 3.9 oz)   SpO2 98%   BMI 25.72 kg/m       MAPs mid 70s-80.     LVAD interrogation:   Heartmate 3 LEFT VS  Flow (Lpm): 4 Lpm  Pulse Index (PI): 4.1 PI  Speed (rpm): 5200 rpm  Power (mari): 3.6 mari  Current Hct settin  No alarms     I/Os: net neg -1.87 L yesterday.     Vitals:    23 0542 23 1000 23 0431   Weight: 76.8 kg (169 lb 5 oz) 76.7 kg (169 lb 1.5 oz) 74.5 kg (164 lb 3.9 oz)       GENERAL: Appears comfortable, in no distress .  HEENT: Eye symmetrical, no discharge or icterus bilaterally. Mucous membranes moist and without lesions.  NECK: Supple, JVD at clavicle  CV: +mechanical LVAD   RESPIRATORY: Respirations regular, even, and unlabored. Lungs CTA throughout.   GI: Soft and non distended with normoactive bowel sounds present " in all quadrants. No tenderness, rebound, guarding.   EXTREMITIES: No peripheral edema. Non pulsatile.   NEUROLOGIC: Alert and oriented x 3. No focal deficits.   MUSCULOSKELETAL: No joint swelling or tenderness.   SKIN: No jaundice. No rashes or lesions. Sternum stable.     Labs:  CMP  Recent Labs   Lab 04/09/23  0553 04/09/23  0547 04/08/23  1135 04/08/23  0634 04/08/23  0459 04/07/23  1355 04/07/23  0454 04/07/23  0337 04/06/23  0716 04/06/23  0405 04/05/23  0639 04/05/23  0414 04/03/23  0730 04/03/23  0442   NA  --  133* 134*  --  132* 134*  --  134*   < > 133*   < > 133*   < > 131*  131*   POTASSIUM  --  4.4 4.7  --  4.5 4.4  --  4.3   < > 4.0   < > 4.1   < > 4.3   CHLORIDE  --  95* 94*  --  95* 95*  --  96*   < > 94*   < > 95*   < > 93*   CO2  --  27 26  --  23 25  --  28   < > 29   < > 30*   < > 29   ANIONGAP  --  11 14  --  14 14  --  10   < > 10   < > 8   < > 9   * 102* 102* 84 112* 120*  --  106*   < > 104*   < > 87   < > 94   BUN  --  11.3 10.5  --  10.6 11.0  --  11.5   < > 11.0   < > 9.0   < > 10.4   CR  --  0.94 0.94  --  0.96 1.01  --  0.91   < > 0.83   < > 0.92   < > 1.16   GFRESTIMATED  --  >90 >90  --  >90 88  --  >90   < > >90   < > >90   < > 74   TONY  --  9.3 9.4  --  8.7 9.0  --  8.6   < > 8.6   < > 8.6   < > 8.3*   MAG  --   --   --   --  1.9  --  2.0  --   --  1.9  --  1.9   < > 1.9   PHOS  --   --   --   --  4.2  --   --  3.8  --  3.3  --   --   --  3.3   PROTTOTAL  --   --   --   --   --   --   --   --   --   --   --   --   --  5.0*   ALBUMIN  --   --   --   --   --   --   --   --   --   --   --   --   --  2.6*   BILITOTAL  --   --   --   --   --   --   --   --   --   --   --   --   --  0.7   ALKPHOS  --   --   --   --   --   --   --   --   --   --   --   --   --  92   AST  --   --   --   --   --   --   --   --   --   --   --   --   --  36   ALT  --   --   --   --   --   --   --   --   --   --   --   --   --  26    < > = values in this interval not displayed.       CBC  Recent Labs    Lab 04/09/23  0547 04/08/23  0459 04/07/23  0337 04/06/23  0405   WBC 13.1* 12.7* 13.3* 15.6*   RBC 3.80* 3.46* 3.70* 3.43*   HGB 11.0* 10.0* 11.0* 10.1*   HCT 36.1* 33.3* 35.6* 33.6*   MCV 95 96 96 98   MCH 28.9 28.9 29.7 29.4   MCHC 30.5* 30.0* 30.9* 30.1*   RDW 15.1* 15.2* 15.0 15.0   * 616* 639* 655*       INR  Recent Labs   Lab 04/09/23  0547 04/08/23  0459 04/07/23  0337 04/06/23  0405   INR 2.17* 1.75* 1.62* 1.75*

## 2023-04-10 ENCOUNTER — CARE COORDINATION (OUTPATIENT)
Dept: CARDIOLOGY | Facility: CLINIC | Age: 58
End: 2023-04-10

## 2023-04-10 ENCOUNTER — ANTICOAGULATION THERAPY VISIT (OUTPATIENT)
Dept: ANTICOAGULATION | Facility: CLINIC | Age: 58
End: 2023-04-10

## 2023-04-10 ENCOUNTER — LAB (OUTPATIENT)
Dept: LAB | Facility: CLINIC | Age: 58
End: 2023-04-10
Payer: COMMERCIAL

## 2023-04-10 ENCOUNTER — DOCUMENTATION ONLY (OUTPATIENT)
Dept: ANTICOAGULATION | Facility: CLINIC | Age: 58
End: 2023-04-10

## 2023-04-10 ENCOUNTER — TELEPHONE (OUTPATIENT)
Dept: ANTICOAGULATION | Facility: CLINIC | Age: 58
End: 2023-04-10

## 2023-04-10 DIAGNOSIS — I50.22 CHRONIC SYSTOLIC CONGESTIVE HEART FAILURE (H): ICD-10-CM

## 2023-04-10 DIAGNOSIS — Z79.899 LONG TERM USE OF DRUG: ICD-10-CM

## 2023-04-10 DIAGNOSIS — Z95.811 LEFT VENTRICULAR ASSIST DEVICE PRESENT (H): ICD-10-CM

## 2023-04-10 DIAGNOSIS — Z95.811 LVAD (LEFT VENTRICULAR ASSIST DEVICE) PRESENT (H): ICD-10-CM

## 2023-04-10 DIAGNOSIS — I50.23 ACUTE ON CHRONIC SYSTOLIC CONGESTIVE HEART FAILURE (H): Primary | ICD-10-CM

## 2023-04-10 LAB
ALBUMIN SERPL BCG-MCNC: 3.9 G/DL (ref 3.5–5.2)
ALP SERPL-CCNC: 152 U/L (ref 40–129)
ALT SERPL W P-5'-P-CCNC: 30 U/L (ref 10–50)
ANION GAP SERPL CALCULATED.3IONS-SCNC: 11 MMOL/L (ref 7–15)
AST SERPL W P-5'-P-CCNC: 34 U/L (ref 10–50)
BASOPHILS # BLD AUTO: 0.2 10E3/UL (ref 0–0.2)
BASOPHILS NFR BLD AUTO: 1 %
BILIRUB SERPL-MCNC: 0.7 MG/DL
BUN SERPL-MCNC: 16.6 MG/DL (ref 6–20)
CALCIUM SERPL-MCNC: 9.7 MG/DL (ref 8.6–10)
CHLORIDE SERPL-SCNC: 92 MMOL/L (ref 98–107)
CREAT SERPL-MCNC: 1.11 MG/DL (ref 0.67–1.17)
DEPRECATED HCO3 PLAS-SCNC: 30 MMOL/L (ref 22–29)
EOSINOPHIL # BLD AUTO: 1 10E3/UL (ref 0–0.7)
EOSINOPHIL NFR BLD AUTO: 7 %
ERYTHROCYTE [DISTWIDTH] IN BLOOD BY AUTOMATED COUNT: 15.1 % (ref 10–15)
GFR SERPL CREATININE-BSD FRML MDRD: 78 ML/MIN/1.73M2
GLUCOSE SERPL-MCNC: 111 MG/DL (ref 70–99)
HCT VFR BLD AUTO: 37.4 % (ref 40–53)
HGB BLD-MCNC: 11.6 G/DL (ref 13.3–17.7)
IMM GRANULOCYTES # BLD: 0.3 10E3/UL
IMM GRANULOCYTES NFR BLD: 2 %
INR PPP: 1.88 (ref 0.85–1.15)
LDH SERPL L TO P-CCNC: 391 U/L (ref 0–250)
LYMPHOCYTES # BLD AUTO: 2.4 10E3/UL (ref 0.8–5.3)
LYMPHOCYTES NFR BLD AUTO: 16 %
MCH RBC QN AUTO: 28.9 PG (ref 26.5–33)
MCHC RBC AUTO-ENTMCNC: 31 G/DL (ref 31.5–36.5)
MCV RBC AUTO: 93 FL (ref 78–100)
MONOCYTES # BLD AUTO: 1.5 10E3/UL (ref 0–1.3)
MONOCYTES NFR BLD AUTO: 10 %
NEUTROPHILS # BLD AUTO: 9.5 10E3/UL (ref 1.6–8.3)
NEUTROPHILS NFR BLD AUTO: 64 %
NRBC # BLD AUTO: 0 10E3/UL
NRBC BLD AUTO-RTO: 0 /100
NT-PROBNP SERPL-MCNC: 6741 PG/ML (ref 0–900)
PLATELET # BLD AUTO: 603 10E3/UL (ref 150–450)
POTASSIUM SERPL-SCNC: 4.4 MMOL/L (ref 3.4–5.3)
PROT SERPL-MCNC: 7.1 G/DL (ref 6.4–8.3)
RBC # BLD AUTO: 4.02 10E6/UL (ref 4.4–5.9)
SODIUM SERPL-SCNC: 133 MMOL/L (ref 136–145)
WBC # BLD AUTO: 15 10E3/UL (ref 4–11)

## 2023-04-10 PROCEDURE — 83615 LACTATE (LD) (LDH) ENZYME: CPT | Performed by: PATHOLOGY

## 2023-04-10 PROCEDURE — 85610 PROTHROMBIN TIME: CPT | Performed by: PATHOLOGY

## 2023-04-10 PROCEDURE — 83880 ASSAY OF NATRIURETIC PEPTIDE: CPT | Performed by: PATHOLOGY

## 2023-04-10 PROCEDURE — 80053 COMPREHEN METABOLIC PANEL: CPT | Performed by: PATHOLOGY

## 2023-04-10 PROCEDURE — 85025 COMPLETE CBC W/AUTO DIFF WBC: CPT | Performed by: PATHOLOGY

## 2023-04-10 PROCEDURE — 36415 COLL VENOUS BLD VENIPUNCTURE: CPT | Performed by: PATHOLOGY

## 2023-04-10 NOTE — PLAN OF CARE
Physical Therapy Discharge Summary    Reason for therapy discharge:    Discharged to home.    Progress towards therapy goal(s). See goals on Care Plan in [facility] electronic [facility] record for goal details.  Goals partially met.  Barriers to achieving goals:   discharge from facility.    Therapy recommendation(s):    Continued therapy is recommended.  Rationale/Recommendations:  Cardiac rehabilitation to address impaired activity tolerance that impacts functional mobility and ADLs..

## 2023-04-10 NOTE — PROGRESS NOTES
ANTICOAGULATION MANAGEMENT     Akshat Fragoso 57 year old male is on warfarin with subtherapeutic INR result. (Goal INR 2.0-3.0)    Recent labs: (last 7 days)     04/10/23  1105   INR 1.88*       ASSESSMENT       Source(s): Chart Review and Patient/Caregiver Call       Warfarin doses taken: Warfarin taken as instructed    Diet: No new diet changes identified    New illness, injury, or hospitalization: Yes: LVAD implanted (HM III) 3/23/23   Hospitalized 3/15/23 - 4/9/23    Medication/supplement changes: on amiodarone    Signs or symptoms of bleeding or clotting: No    Previous INR: Therapeutic last visit; previously outside of goal range    Additional findings: started warfarin 3/5/23         PLAN     Recommended plan for ongoing change(s) affecting INR     Dosing Instructions: Increase your warfarin dose  with next INR in 2 days       Summary  As of 4/10/2023    Full warfarin instructions:  5 mg every Sun; 7.5 mg all other days   Next INR check:  4/12/2023             Telephone call with Akshat and his spouse, Landry, who agrees to plan and repeated back plan correctly    Lab visit scheduled    Education provided:     Contact [phone] with any changes, questions or concerns.   New patient education completed.    Plan made with [facility] Pharmacist Jillian Muñoz and per LVAD protocol--see TE encounter from earlier today.       Yessenia Mcfarland RN  Anticoagulation Clinic  4/10/2023    _______________________________________________________________________     Anticoagulation Episode Summary     Current INR goal:  2.0-3.0   TTR:  --   Target end date:  Indefinite   Send INR reminders to:  ANTICOAG LVAD    Indications    Acute on chronic systolic congestive heart failure (H) [I50.23]  LVAD (left ventricular assist device) present (H) [Z95.811]  Chronic systolic congestive heart failure (H) [I50.22]  Long term use of drug [Z79.899]  Left ventricular assist device present (H) [Z95.811]           Comments:            Anticoagulation Care Providers     Provider Role Specialty Phone number    Kenzie Moreau MD Referring Advanced Heart Failure and Transplant Cardiology [phone]    Mile Bolviar, APRN CNP Referring Nurse Practitioner [phone]

## 2023-04-10 NOTE — TELEPHONE ENCOUNTER
ANTICOAGULATION  MANAGEMENT: Discharge Review    Akshat Fragoso chart reviewed for anticoagulation continuity of care    Hospital Admission on 03/15-04/09 for LVAD implantation  (Heartmate III), acute on chronic HFrEF (EF 10%), VT/VF arrest.    Discharge disposition: Home    Results:    Recent labs: (last 7 days)     04/04/23  0532 04/05/23  0414 04/05/23  1458 04/05/23  2234 04/06/23  0405 04/06/23  1211 04/06/23  1909 04/07/23  0337 04/07/23  0924 04/08/23  0459 04/08/23  1135 04/08/23  1922 04/09/23  0547   INR 1.63* 1.70*  --   --  1.75*  --   --  1.62*  --  1.75*  --   --  2.17*   AAUFH  --   --  <0.10 0.14 0.19 0.30 0.23 0.39 0.32 0.20 0.36 0.46 0.50     Anticoagulation inpatient management:     Patient was initiated on warfarin on 03/25 after implantation of Heartmate III left ventricular assist device with goal range of 2-3.      Anticoagulation discharge instructions:     Warfarin dosing: Take 5 mg on 04/09. INR check on 04/10, adjust dose based on INR.   Bridging: No   INR goal change: No      Medication changes affecting anticoagulation: Yes: Developed post-op A Fib with RVR on 3/25. Initiated on amiodarone infusion, followed by PO taper which was decreased to 200 mg daily on 04/02. Patient was discharged home on amiodarone 200 mg daily.     Additional factors affecting anticoagulation: No     PLAN     Agree with discharge plan for follow up today (04/10). Will adjust warfarin dose as appropriate      Patient not contacted    Anticoagulation Calendar updated    MAURICE BANDA [facility]

## 2023-04-10 NOTE — PROGRESS NOTES
"ANTICOAGULATION  MANAGEMENT: NEW REFERRAL      SUBJECTIVE/OBJECTIVE     Akshat Fragoso, a 57 year old male  is newly referred to [facility].    Anticoagulation:    Previously on warfarin: No, new to anticoagulation  Warfarin initiation date (approximate): 3/25/23   Indication(s): LVAD   Goal Range: 2.0-3.0   Anticoagulation Bridge/Overlap: No   Referring provider: from heartcare provider    General Dietary/Social Hx:    Typical vitamin K intake: low; consistent     Other dietary considerations: None and Herbal supplements or teas: lemon bear     Social History: Typical alcohol intake: never  Smokes Cigarettes--no plan to smoke again  CBD/THC use: used to; no plan to use again    In the past 2 weeks, patient estimates taking medications as instructed % of time: 100    Results:        Recent labs: (last 7 days)     04/10/23  1105   INR 1.88*       Wt Readings from Last 2 Encounters:   04/09/23 74.5 kg (164 lb 3.9 oz)   01/28/21 87.5 kg (193 lb)      Estimated body mass index is 25.72 kg/m  as calculated from the following:    Height as of 3/16/23: 1.702 m (5' 7\").    Weight as of 4/9/23: 74.5 kg (164 lb 3.9 oz).  Lab Results   Component Value Date    AST 34 04/10/2023     Lab Results   Component Value Date    CR 1.11 04/10/2023     Estimated Creatinine Clearance: 79.2 mL/min (based on SCr of 1.11 mg/dL).    ASSESSMENT       Goal INR 2-3, standard for indication(s) above  Establishing initial warfarin maintenance dose (on warfarin < 30 days)     Starting warfarin dose adjustment recommended 7.5 mg daily    PLAN     Dosing Instructions: Increase your warfarin dose with INR in 2 days           Education provided:     Taking warfarin: take warfarin at same time each day; preferably in the evening, prescribed tablet strength and color and importance of following [facility] instructions vs instructions on the prescription bottle    Goal range and lab monitoring: goal range and significance of current " result and frequency of lab work when starting warfarin and importance of following up when instructed (extends after stability established)    Dietary considerations: importance of consistent vitamin K intake, impact of vitamin K foods on INR and importance of notifying [facility] to changes in diet    Healthy lifestyle considerations: potential interaction between warfarin and alcohol and impact of CBD, marijuana, or medical marijuana on INR    Symptom monitoring: monitoring for bleeding signs and symptoms and monitoring for clotting signs and symptoms    Importance of notifying anticoagulation clinic for: changes in medications; a sooner lab recheck maybe needed    Contact [phone] with any changes, questions or concerns.     Education still needed:     Taking warfarin: importance of following [facility] instructions vs instructions on the prescription bottle    Healthy lifestyle considerations: avoid activities that have a high risk for falling or injury such as contact sports    Symptom monitoring: travel related clotting risk and prevention      Telephone call with Akshat and his spouse, Landry who agree  to plan and repeated back plan correctly    Check at provider office visit    Standing orders placed in Epic: placed by pharmacist  Plan made with [facility] Pharmacist Jillian Mcfarland RN  Anticoagulation Clinic  4/10/20

## 2023-04-11 ENCOUNTER — TELEPHONE (OUTPATIENT)
Dept: PHARMACY | Facility: OTHER | Age: 58
End: 2023-04-11
Payer: COMMERCIAL

## 2023-04-11 ENCOUNTER — CARE COORDINATION (OUTPATIENT)
Dept: CARDIOLOGY | Facility: CLINIC | Age: 58
End: 2023-04-11
Payer: COMMERCIAL

## 2023-04-11 NOTE — TELEPHONE ENCOUNTER
MTM referral from: Transitions of Care (recent hospital discharge or ED visit)    MTM referral outreach attempt #2 on April 11, 2023 at 1:01 PM      Outcome: Patient not reachable after several attempts, will route to MT Pharmacist/Provider as an FYI.  [facility] scheduling number is [phone].  Thank you for the referral.    Jackie John - [facility]

## 2023-04-11 NOTE — PROGRESS NOTES
Called patient/caregiver to check in 24hrs post discharge. Pt reports VAD parameters at baseline and weight stable. Reviewed medications and answered any questions. Patient reports sleeping well and no anxiety since being home with LVAD. Patient is able to move around the house and care for him/herself independently.     Discussed specific new problems/stressors since being discharged from the hospital: discussed dressing supply order. Empathized with patient and reviewed coping strategies: enlisting support from friends and love ones, attending patient and caregiver support groups, reviewing LVAD educational materials to reinforce knowledge, and talking about concerns with family/care providers/trusted others. Encouraged pt to page VAD Coordinator with any issues or questions. Pt verbalizes understanding.

## 2023-04-11 NOTE — PROGRESS NOTES
Landry called in with concerns about Akshat's PI and flow. She said they had slept in today and upon awakening, The PI was 5.2 and the flow was 3.9. His PI is normally in the 4s and the flow in the 4s. His weight is 163.8 lbs today, down 1 lb from when he got home over he weekend. He said he feels great with no dizziness or breathing problems. He had not taken his am meds yet. There is no drainage for the DLES.     I explained that it is normal for the PI and power to fluctuate some. Also, because he hadn't yet  taken his HF/BP meds, it makes sense that the BP is presumeably higher and the PI is higher and the flow slightly lower. Akshat and Landry thought that made sense. Once the WCR supplies arrive, they can being doing home MAP checks as needed.    Landry said she was having trouble getting a PCP appointment for Akshat. I gave her the [facility] number [phone].

## 2023-04-12 ENCOUNTER — LAB (OUTPATIENT)
Dept: LAB | Facility: CLINIC | Age: 58
End: 2023-04-12
Payer: COMMERCIAL

## 2023-04-12 ENCOUNTER — OFFICE VISIT (OUTPATIENT)
Dept: CARDIOLOGY | Facility: CLINIC | Age: 58
End: 2023-04-12
Attending: INTERNAL MEDICINE
Payer: COMMERCIAL

## 2023-04-12 ENCOUNTER — CARE COORDINATION (OUTPATIENT)
Dept: CARDIOLOGY | Facility: CLINIC | Age: 58
End: 2023-04-12

## 2023-04-12 ENCOUNTER — ANTICOAGULATION THERAPY VISIT (OUTPATIENT)
Dept: ANTICOAGULATION | Facility: CLINIC | Age: 58
End: 2023-04-12

## 2023-04-12 VITALS — SYSTOLIC BLOOD PRESSURE: 82 MMHG | OXYGEN SATURATION: 93 % | HEART RATE: 76 BPM

## 2023-04-12 DIAGNOSIS — I50.22 CHRONIC SYSTOLIC CONGESTIVE HEART FAILURE (H): ICD-10-CM

## 2023-04-12 DIAGNOSIS — Z95.811 LVAD (LEFT VENTRICULAR ASSIST DEVICE) PRESENT (H): ICD-10-CM

## 2023-04-12 DIAGNOSIS — Z95.811 LEFT VENTRICULAR ASSIST DEVICE PRESENT (H): ICD-10-CM

## 2023-04-12 DIAGNOSIS — Z79.899 LONG TERM USE OF DRUG: ICD-10-CM

## 2023-04-12 DIAGNOSIS — I50.22 CHRONIC SYSTOLIC CONGESTIVE HEART FAILURE (H): Primary | ICD-10-CM

## 2023-04-12 DIAGNOSIS — I50.22 CHRONIC SYSTOLIC HEART FAILURE (H): ICD-10-CM

## 2023-04-12 DIAGNOSIS — I50.23 ACUTE ON CHRONIC SYSTOLIC CONGESTIVE HEART FAILURE (H): Primary | ICD-10-CM

## 2023-04-12 LAB
ALBUMIN SERPL BCG-MCNC: 3.7 G/DL (ref 3.5–5.2)
ALP SERPL-CCNC: 140 U/L (ref 40–129)
ALT SERPL W P-5'-P-CCNC: 26 U/L (ref 10–50)
ANION GAP SERPL CALCULATED.3IONS-SCNC: 9 MMOL/L (ref 7–15)
AST SERPL W P-5'-P-CCNC: 32 U/L (ref 10–50)
BASOPHILS # BLD AUTO: 0.2 10E3/UL (ref 0–0.2)
BASOPHILS NFR BLD AUTO: 2 %
BILIRUB SERPL-MCNC: 0.6 MG/DL
BUN SERPL-MCNC: 18.2 MG/DL (ref 6–20)
CALCIUM SERPL-MCNC: 9.3 MG/DL (ref 8.6–10)
CHLORIDE SERPL-SCNC: 95 MMOL/L (ref 98–107)
CREAT SERPL-MCNC: 1.12 MG/DL (ref 0.67–1.17)
DEPRECATED HCO3 PLAS-SCNC: 33 MMOL/L (ref 22–29)
EOSINOPHIL # BLD AUTO: 1.2 10E3/UL (ref 0–0.7)
EOSINOPHIL NFR BLD AUTO: 9 %
ERYTHROCYTE [DISTWIDTH] IN BLOOD BY AUTOMATED COUNT: 14.9 % (ref 10–15)
GFR SERPL CREATININE-BSD FRML MDRD: 78 ML/MIN/1.73M2
GLUCOSE SERPL-MCNC: 105 MG/DL (ref 70–99)
HCT VFR BLD AUTO: 35.7 % (ref 40–53)
HGB BLD-MCNC: 11 G/DL (ref 13.3–17.7)
IMM GRANULOCYTES # BLD: 0.3 10E3/UL
IMM GRANULOCYTES NFR BLD: 2 %
INR PPP: 3.01 (ref 0.85–1.15)
LDH SERPL L TO P-CCNC: 365 U/L (ref 0–250)
LYMPHOCYTES # BLD AUTO: 1.9 10E3/UL (ref 0.8–5.3)
LYMPHOCYTES NFR BLD AUTO: 15 %
MCH RBC QN AUTO: 29 PG (ref 26.5–33)
MCHC RBC AUTO-ENTMCNC: 30.8 G/DL (ref 31.5–36.5)
MCV RBC AUTO: 94 FL (ref 78–100)
MONOCYTES # BLD AUTO: 1.2 10E3/UL (ref 0–1.3)
MONOCYTES NFR BLD AUTO: 9 %
NEUTROPHILS # BLD AUTO: 8.6 10E3/UL (ref 1.6–8.3)
NEUTROPHILS NFR BLD AUTO: 63 %
NRBC # BLD AUTO: 0 10E3/UL
NRBC BLD AUTO-RTO: 0 /100
NT-PROBNP SERPL-MCNC: 4219 PG/ML (ref 0–900)
PLATELET # BLD AUTO: 487 10E3/UL (ref 150–450)
POTASSIUM SERPL-SCNC: 4.1 MMOL/L (ref 3.4–5.3)
PROT SERPL-MCNC: 6.7 G/DL (ref 6.4–8.3)
RBC # BLD AUTO: 3.79 10E6/UL (ref 4.4–5.9)
SODIUM SERPL-SCNC: 137 MMOL/L (ref 136–145)
WBC # BLD AUTO: 13.4 10E3/UL (ref 4–11)

## 2023-04-12 PROCEDURE — G0463 HOSPITAL OUTPT CLINIC VISIT: HCPCS | Mod: 25 | Performed by: NURSE PRACTITIONER

## 2023-04-12 PROCEDURE — 99214 OFFICE O/P EST MOD 30 MIN: CPT | Mod: 24 | Performed by: NURSE PRACTITIONER

## 2023-04-12 PROCEDURE — 83880 ASSAY OF NATRIURETIC PEPTIDE: CPT | Performed by: PATHOLOGY

## 2023-04-12 PROCEDURE — 83615 LACTATE (LD) (LDH) ENZYME: CPT | Performed by: PATHOLOGY

## 2023-04-12 PROCEDURE — 36415 COLL VENOUS BLD VENIPUNCTURE: CPT | Performed by: PATHOLOGY

## 2023-04-12 PROCEDURE — 80053 COMPREHEN METABOLIC PANEL: CPT | Performed by: PATHOLOGY

## 2023-04-12 PROCEDURE — 85610 PROTHROMBIN TIME: CPT | Performed by: PATHOLOGY

## 2023-04-12 PROCEDURE — 93282 PRGRMG EVAL IMPLANTABLE DFB: CPT | Performed by: INTERNAL MEDICINE

## 2023-04-12 PROCEDURE — 93750 INTERROGATION VAD IN PERSON: CPT | Performed by: NURSE PRACTITIONER

## 2023-04-12 PROCEDURE — 85025 COMPLETE CBC W/AUTO DIFF WBC: CPT | Performed by: PATHOLOGY

## 2023-04-12 RX ORDER — LISINOPRIL 5 MG/1
5 TABLET ORAL DAILY
Qty: 90 TABLET | Refills: 3 | Status: SHIPPED | OUTPATIENT
Start: 2023-04-12 | End: 2023-04-12

## 2023-04-12 RX ORDER — LISINOPRIL 5 MG/1
5 TABLET ORAL DAILY
Qty: 90 TABLET | Refills: 3 | Status: SHIPPED | OUTPATIENT
Start: 2023-04-12 | End: 2023-04-18

## 2023-04-12 ASSESSMENT — PAIN SCALES - GENERAL: PAINLEVEL: NO PAIN (0)

## 2023-04-12 NOTE — PATIENT INSTRUCTIONS
It was a pleasure to see you in clinic today.  Please do not hesitate to call with any questions or concerns.  We look forward to seeing you in clinic at your next device check in 3 months.    Mirella Gao, RN, MS, CCRN  Electrophysiology Nurse Clinician  [facility]    During Business Hours Please Call:  [phone]  After Hours Please Call:  [phone] - select option #4 and ask for job code [phone]

## 2023-04-12 NOTE — PATIENT INSTRUCTIONS
Medications:  Increase Lisinopril to 5mg daily    Instructions:  Watch for increased dizziness or lightheadedness since we are increasing your Lisinopril  Make sure there is 2 fingers of wiggle room under the driveline when securing it to the anchor.  Let us know if your skin doesn't heal under the anchor and we can order some different options  Give your open area 15 minutes of air time when changing your dressing to allow for healing  Cover open area with 2x2 to avoid the adhesive touching your skin  We removed your stitch today. Expect a slight increase in drainage over the course of the next two days.  Please notify the VAD coordinator oncall right away if you notice any signs of infection.  We will re-assess your driveline seal at your appointment with Dr. Waller to determine if you can switch to the weekly dressing and how close you are to showering.  Mile will talk to Dr. Aguiar about how long you will need to stay on your amiodarone.  The goal would be to get you off of it, if possible.    Follow-up: (make these appointments before you leave)  1. Please follow-up with VAD RABIA in the week of April 24th, ideally the 27th, if possible, with labs prior.   2. All other follow up as previously arranged.      Page the VAD Coordinator on call if you gain more than 3 lb in a day or 5 in a week. Please also page if you feel unwell or have alarms.   Great to see you in clinic today. To Page the VAD Coordinator on call, dial [phone] option #[phone] and ask to speak to the VAD coordinator on call.

## 2023-04-12 NOTE — PROGRESS NOTES
Pt's wife paged VAD Coordinator on call at 11pm reporting episode of vomiting. Pt reportedly had sudden onset of nausea and then vomited. After vomiting, pt feels ok. VAD parameters are stable, no alarms. Pt's wife reports he had been very active today and also had some burritos and hot sauce for dinner. Encouraged pt to rest and pace himself, and to page back if he has continued vomiting or cannot keep down fluids.   Pt is scheduled to be seen in clinic tomorrow am.

## 2023-04-12 NOTE — NURSING NOTE
Chief Complaint   Patient presents with     Follow Up     Return VAD -  Post implant 3/23/23     Vitals were taken and medications reconciled.    Jose L Cruz, ELYSSA  11:11 AM

## 2023-04-12 NOTE — PROGRESS NOTES
ANTICOAGULATION MANAGEMENT     Akshat Fragoso 57 year old male is on warfarin with therapeutic INR result. (Goal INR 2.0-3.0)    Recent labs: (last 7 days)     04/12/23  1022   INR 3.01*       ASSESSMENT       Source(s): Chart Review       Warfarin doses taken: Reviewed in chart    Diet: No new diet changes identified    New illness, injury, or hospitalization: Yes: Hospitalized 3/15-4/9 for LVAD placement    Medication/supplement changes: Patient continues on Amiodarone    Signs or symptoms of bleeding or clotting: No    Previous INR: Subtherapeutic    Additional findings: None         PLAN     Recommended plan for ongoing change(s) affecting INR     Dosing Instructions: decrease your warfarin dose (20% change) with next INR in 6 days       Summary  As of 4/12/2023    Full warfarin instructions:  7.5 mg every Sun, Tue; 5 mg all other days   Next INR check:  4/18/2023             Detailed voice message left for Akshat with dosing instructions and follow up date.     Check at provider office visit    Education provided:     Please call back if any changes to your diet, medications or how you've been taking warfarin    Contact [phone] with any changes, questions or concerns.     Plan made with [facility] Pharmacist Lizette Yi and per LVAD protocol    Merry Reyes RN  Anticoagulation Clinic  4/12/2023    _______________________________________________________________________     Anticoagulation Episode Summary     Current INR goal:  2.0-3.0   TTR:  --   Target end date:  Indefinite   Send INR reminders to:  ANTICOAG LVAD    Indications    Acute on chronic systolic congestive heart failure (H) [I50.23]  LVAD (left ventricular assist device) present (H) [Z95.811]  Chronic systolic congestive heart failure (H) [I50.22]  Long term use of drug [Z79.899]  Left ventricular assist device present (H) [Z95.811]           Comments:           Anticoagulation Care Providers     Provider Role Specialty Phone number    Prieto  Kenzie Chavis MD Referring Advanced Heart Failure and Transplant Cardiology [phone]    Mile Bolivar, APRN CNP Referring Nurse Practitioner [phone]

## 2023-04-12 NOTE — NURSING NOTE
04/12/23 1100   [facility] VAD Information   Implant LVAD   LVAD Pump HeartMate 3   Heartmate 3 LEFT VS   Flow (Lpm) 4.5 Lpm   Pulse Index (PI) 3.1 PI  (Range 1.9 - 5.6, usually in the 3's)   Speed (rpm) 5200 rpm   Power (mari) 3.7 mari   Current Hct setting 36  (No change)   Primary Controller   Serial number [id number]   Low flow alarm setting 2.5   Backup Controller   Serial number [id number]   EBB: Patient use 4   Replace EBB in 30 Months   VAD Interrogation   Alarms reported by patient N   Unexpected alarms noted upon interrogation None   PI events Rare  (History goes back to 4/4)   Damage to equipment is noted N   Action taken Reviewed proper equipment care and maintenance   Driveline Exit Site   Dressing change done Y   Driveline properly secured Yes   DLES assessment c/d/i  (Suture removed due to being burried. Two small open areas under adhesive.)   Dressing used Daily kit   Frequency patient changes dressing Daily     Education Complete: Yes   Charge the BACKUP controller s backup battery every 6 months  Perform a self test on BACKUP every 6 months  Change the MPU s batteries every 6 months:Yes  Have equipment serviced yearly (if applicable):Yes      D:  Pt education provided.  I:  Pt educated on the following topics:  1.  When to call 911.  Reviewed emergency situations (handout provided with what to do in an emergency)  2. When to page the VAD Coordinator on Call (handout provided w/ frequent symptoms to report).  3. Reviewed how to page the VAD Coordinator on Call.     A:  Pt verbalized understanding.  P:  VAD Coordinator available for questions or concerns.  Will continue VAD education.

## 2023-04-12 NOTE — LETTER
4/12/2023      RE: Akshat Fragoso  [address]  [facility] [id number]       Dear Colleague,    Thank you for the opportunity to participate in the care of your patient, Akshat Fragoso, at the [facility] [place] at [facility]. Please see a copy of my visit note below.      [facility] Cardiology   [facility] Clinic      HPI:   Mr. 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 He underwent chest compressions and ROSC was obtained within 6 minutes. He was evaluated for placement of an LVAD and completed an extensive pre-operative work-up.  On 3/23/23 he underwent placement of HM3 LVAD. He presents to clinic for hospital follow up     With regards to his recent hospitalization, Mr. Fragoso underwent successful placement of HM3 LVAD on 3/23/23. His post-op course was relatively uncomplicated. He was started on ASA 81 mg daily, lisinopril 2.5 mg daily, digoxin and spironolactone 12.5 mg daily. He developed post-operative atrial fibrillation with RVR on 3/28. Started on amiodarone gtt and converted to NSR. He was transitioned to PO taper and decreased to 200 mg daily. He was started on coumadin for his LVAD and bridged with heparin. INR was therapeutic at discharge.      He was diuresed with intermittent boluses of IV bumex to his pre-operative weight and transitioned to bumex 1 mg PO daily on 4/6.      He had a post-operative leukocytosis. CXR showed a left sided consolidation concerning for pneumonia. He was started on empiric vanc/zosyn from 3/29 to 4/4. ID was consulted. Cultures remained negative and CT chest 4/3 showed no source of infection. ID consulted and recommending watching off antibiotics. Mr. Fragoso was discharged home  on 4/9/2023.     Today, Mr. Fragoso presents to clinic with his wife. Since discharge he has been feeling quite well. Appetite is robust. He notes that his sleep schedule is disrupted but knows that this should improve with time. Last evening he had an episode of emesis after eating a bean and cheese burrito and pumpkin pie. Felt perfectly fine after this episode and denies any recurrent nausea or vomiting. No chest pain. He went for a 30 minute walk with his son yesterday and denied any JOHNSON. No acute SOB. Some pain with deep inspiration due to fractured ribs. Denies any lightheadedness or dizziness, syncope, or near syncope. Denies any ICD shocks.     Home weights stable 163-164 lb    No blood in the urine or blood in the stool or prolonged nosebleeds.     No fevers or chills. Driveline redness or pain.  No driveline drainage.     No headaches or vision changes. No stroke symptoms.     No LVAD alarms.       VAD interrogation 04/12/23: VAD interrogation reviewed with VAD coordinator. Agree with findings. Rare PI events, no power spikes, speed drops, or other findings suspicious of pump malfunction noted.         Cardiac Medications  - ASA 81 mg daily  - Amiodarone 200 mg daily   - Bumex 1 mg daily   - Digoxin 250 mcg daily   - Jardiance 10 mg daily  - Lisinopril 2.5 mg daily   - Spironolactone 12.5 mg daily   - Warfarin         PAST MEDICAL HISTORY:  Past Medical History:   Diagnosis Date    Acute right lumbar radiculopathy 11/02/2015    Acute systolic heart failure (H) 01/10/2017    Cardiomyopathy (H) 01/10/2017    CKD (chronic kidney disease) stage 3, GFR 30-59 ml/min (H) 01/30/2020    JOHNSON (dyspnea on exertion)     ED (erectile dysfunction) 10/02/2019    Erectile dysfunction     ICD (implantable cardioverter-defibrillator) in place- [employer], single chamber- NOT dependent 10/09/2017    Personal history of smoking 01/04/2017    Pulmonary nodules 01/17/2017    CT 11/2018:  Bilateral pulmonary nodules  measuring up to 4 mm. No new or enlarging pulmonary nodules.       FAMILY HISTORY:  Family History   Problem Relation Age of Onset    Parkinsonism Mother     Atrial fibrillation Father     Heart Failure Father     Prostate Cancer Father     Skin Cancer Father     Anorexia nervosa Daughter     Other - See Comments Granddaughter         premature birth       SOCIAL HISTORY:  Social History     Socioeconomic History    Marital status:      Spouse name: Cheryl    Number of children: 2   Occupational History    Occupation: Construction      Employer: SELF   Tobacco Use    Smoking status: Light Smoker     Packs/day: 0.25     Years: 15.00     Pack years: 3.75     Types: Cigarettes    Smokeless tobacco: Former     Quit date: 10/24/2011   Vaping Use    Vaping status: Never Used   Substance and Sexual Activity    Alcohol use: No     Alcohol/week: 0.0 standard drinks of alcohol    Drug use: No    Sexual activity: Yes     Partners: Female     Birth control/protection: Condom   Other Topics Concern    Parent/sibling w/ CABG, MI or angioplasty before 65F 55M? Yes     Comment: both parents had stints placed        CURRENT MEDICATIONS:  acetaminophen (TYLENOL) 325 MG tablet, Take 2 tablets (650 mg) by mouth every 4 hours as needed for other (For optimal non-opioid multimodal pain management to improve pain control.)  amiodarone (PACERONE) 200 MG tablet, Take 1 tablet (200 mg) by mouth daily  aspirin (ASA) 81 MG chewable tablet, 1 tablet (81 mg) by Oral or NG Tube route daily  bumetanide (BUMEX) 1 MG tablet, Take 1 tablet (1 mg) by mouth daily  digoxin (LANOXIN) 250 MCG tablet, Take 1 tablet (250 mcg) by mouth daily  empagliflozin (JARDIANCE) 10 MG TABS tablet, Take 1 tablet (10 mg) by mouth daily  gabapentin (NEURONTIN) 300 MG capsule, 1 capsule (300 mg) by Oral or Feeding Tube route 3 times daily  nystatin (MYCOSTATIN) 516577 UNIT/ML suspension, Take 5 mLs (500,000 Units) by mouth 4 times daily  oxyCODONE IR (ROXICODONE)  5 MG tablet, Take 1-2 tablets (5-10 mg) by mouth every 6 hours as needed for severe pain  pantoprazole (PROTONIX) 40 MG EC tablet, Take 1 tablet (40 mg) by mouth every morning (before breakfast)  senna-docusate (SENOKOT-S/PERICOLACE) 8.6-50 MG tablet, Take 2 tablets by mouth 2 times daily as needed for constipation  spironolactone (ALDACTONE) 25 MG tablet, Take 0.5 tablets (12.5 mg) by mouth daily  warfarin ANTICOAGULANT (COUMADIN) 5 MG tablet, Take 1 tablet (5 mg) by mouth at 6 pm on 4/9 and have INR check 4/10 and have dose adjusted  methocarbamol (ROBAXIN) 500 MG tablet, Take 1 tablet (500 mg) by mouth 4 times daily (Patient not taking: Reported on 4/12/2023)  polyethylene glycol (MIRALAX) 17 GM/Dose powder, Take 17 g by mouth daily (Patient not taking: Reported on 4/12/2023)    No current facility-administered medications on file prior to visit.      ROS:   CONSTITUTIONAL: Denies fever, chills, fatigue, or weight fluctuations.   HEENT: Denies headache, vision changes, and changes in speech.   CV: Refer to HPI.   PULMONARY:Refer to HPI.   GI:Denies nausea, vomiting, diarrhea, and abdominal pain. Bowel movements are regular.   :Denies urinary alterations, dysuria, urinary frequency, hematuria, and abnormal drainage.   EXT:Denies lower extremity edema.   SKIN:Denies abnormal rashes or lesions.   MUSCULOSKELETAL:Denies upper or lower extremity weakness and pain.   NEUROLOGIC:Denies lightheadedness, dizziness, seizures, or upper or lower extremity paresthesia.     EXAM:  BP (!) 82/0 (BP Location: Right arm, Patient Position: Chair, Cuff Size: Adult Regular)   Pulse 76   SpO2 93%     GENERAL: Appears comfortable, in no distress .  HEENT: Eye symmetrical and without discharge or icterus bilaterally. Mucous membranes moist and without lesions.  NECK: Supple, JVD 4 cm.   CV: RRR, +S1S2, + LVAD hum  RESPIRATORY: Respirations regular, even, and unlabored. Lungs CTA throughout.   GI: Soft and non distended with  normoactive bowel sounds present in all quadrants. No tenderness, rebound, guarding. No organomegaly.   EXTREMITIES: No peripheral edema. Non-pulsatile.   NEUROLOGIC: Alert and orientated x 3.  No focal deficits.   MUSCULOSKELETAL: No joint swelling or tenderness.   SKIN: No jaundice. No rashes or lesions. Driveline dressing CDI.    Labs - as reviewed in clinic with patient today:  CBC RESULTS:  Lab Results   Component Value Date    WBC 13.4 (H) 04/12/2023    WBC 9.8 11/20/2019    RBC 3.79 (L) 04/12/2023    RBC 4.70 11/20/2019    HGB 11.0 (L) 04/12/2023    HGB 14.3 11/20/2019    HCT 35.7 (L) 04/12/2023    HCT 45.3 11/20/2019    MCV 94 04/12/2023    MCV 96 11/20/2019    MCH 29.0 04/12/2023    MCH 30.4 11/20/2019    MCHC 30.8 (L) 04/12/2023    MCHC 31.6 11/20/2019    RDW 14.9 04/12/2023    RDW 13.0 11/20/2019     (H) 04/12/2023     11/20/2019       CMP RESULTS:  Lab Results   Component Value Date     04/12/2023     11/20/2019    POTASSIUM 4.1 04/12/2023    POTASSIUM 3.3 (L) 03/23/2023    POTASSIUM 4.5 11/20/2019    CHLORIDE 95 (L) 04/12/2023    CHLORIDE 105 11/20/2019    CO2 33 (H) 04/12/2023    CO2 28 11/20/2019    ANIONGAP 9 04/12/2023    ANIONGAP 5 11/20/2019     (H) 04/12/2023     (H) 04/09/2023     (H) 11/20/2019    BUN 18.2 04/12/2023    BUN 23 11/20/2019    CR 1.12 04/12/2023    CR 0.90 11/20/2019    GFRESTIMATED 78 04/12/2023    GFRESTIMATED >90 11/20/2019    GFRESTBLACK >90 11/20/2019    TONY 9.3 04/12/2023    TONY 8.7 11/20/2019    BILITOTAL 0.6 04/12/2023    BILITOTAL 0.3 11/20/2019    ALBUMIN 3.7 04/12/2023    ALBUMIN 3.6 11/20/2019    ALKPHOS 140 (H) 04/12/2023    ALKPHOS 68 11/20/2019    ALT 26 04/12/2023    ALT 22 11/20/2019    AST 32 04/12/2023    AST 16 11/20/2019        INR RESULTS:  Lab Results   Component Value Date    INR 3.01 (H) 04/12/2023    INR 1.01 11/19/2018       Lab Results   Component Value Date    MAG 1.9 04/08/2023    MAG 2.1 01/12/2017      Lab Results   Component Value Date    NTBNPI 31,087 (H) 03/20/2023    NTBNPI 11,183 (H) 01/09/2017     Lab Results   Component Value Date    NTBNP 4,219 (H) 04/12/2023    NTBNP 2,306 (H) 11/20/2019         Cardiac Diagnostics    4/12/23 Device Interrogation      Device: [employer] D150 DYNAGEN  Normal device function  Mode: VVI 40 bpm  : 0%  Intrinsic rhythm: VS @ 91 bpm  R waves measured 2 mV today which is not a new finding since LVAD implant on 3/23/23. RV threshold measured 1.5 V @ 0.4 ms  Estimated battery longevity to RRT = 10.5 years  Anticoagulant: warfarin  Ventricular Arrhythmia: 1 episode recorded in the VT monitor zone on 3/28/23 @ 1303 - 14 sec, 175 bpm  Setting Changes: None  Patient has an appointment to see VERONICA Byrd CNP today.   Plan: RTC in 3 months    3/28/2023 Echo  Interpretation Summary  Technically difficult study.Extremely poor acoustic windows.  Limited information was obtained during study.  LVAD cannula was seen in the expected anatomic position in the LV apex.  HM3 at 5200RPM.  Septum normal.  LVIDd 56mm.  Aortic valve not well visualized. No AI.  Normal outflow velocity.  Global right ventricular function is moderately to severely reduced.  Small pericardial effusion with organizing material in pericardial cavity.      Assessment and Plan:   Mr. Fragoso is a 57 year old male with medical history pertinent for HFrEF (10%) secondary to NICM (suspected viral etiology) s/p ICD, moderate TR s/p TV repair, pAF, chronic tobacco/marijuana use, COPD, HTN, CKD stage III who was admitted to [facility] on 3/15/23 following a VT/VF arrest. He underwent chest compressions and ROSC was obtained within 6 minutes. He was evaluated for placement of an LVAD and completed an extensive pre-operative work-up.  On 3/23/23 he underwent placement of HM3 LVAD. He presents to clinic for hospital follow up.    Appearing and feeling well. He is euvolemic by exam. No LVAD pump alarms or other  concerning findings. Review of today's labs are stable with a Cr of 1.12 and lytes wnl. Hgb 11. MAPs 80s. Increase lisinopril to 5 mg daily. ICD interrogation with one episode of VT on 3/28. Reasonable to consider zio to monitor for AF and if negative, stop amiodarone.     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 NYHA Class III.    Fluid status: euvolemic Bumex 1 mg PO daily  RV support/rate control: digoxin 250 mcg daily, last level 1.8 (for rate control)  ACEi/ARB/ARNi/afterload reduction: increase lisinopril to 5 mg daily  BB: deferred due to recent VAD  Aldosterone antagonist: spironolactone 12.5 mg daily  SGLT2i: empagliflozin 10 mg daily  SCD prophylaxis: ICD, reprogrammed device to provide ATP burst at VT threshold  MAP: 82  LDH trends: 365, stable  Anticoagulation: warfarin dosing per pharmacy, dose increased yesterday, INR goal 2-3, today 3.01  Antiplatelet: ASA 81 mg daily     # Atrial fibrillation with RVR 3/28, resolved  Received adenosine 3/28/23 which slowed rate. Then started on amiodarone gtt.  - continue amiodarone 200 mg daily, will need to discuss duration.   - consider Zio    # Mild leukocytopenia, improving  No localizing symptoms of infection.   - WBC 13.4, trending down.         Follow up:  - VAD RABIA 4/18/23        Mile Bolivar DNP, NP-C  Advance Heart Failure  04/12/23

## 2023-04-12 NOTE — PROGRESS NOTES
[facility] Cardiology   VAD Clinic      HPI:   Mr. Fragoso is a 57 year old male with medical history pertinent for HFrEF (10%) secondary to NICM (suspected viral etiology) s/p ICD, moderate TR s/p TV repair, pAF, chronic tobacco/marijuana use, COPD, HTN, CKD stage III who was admitted to [facility] on 3/15/23 following a VT/VF arrest. He underwent chest compressions and ROSC was obtained within 6 minutes. He was evaluated for placement of an LVAD and completed an extensive pre-operative work-up.  On 3/23/23 he underwent placement of HM3 LVAD. He presents to clinic for hospital follow up     With regards to his recent hospitalization, Mr. Fragoso underwent successful placement of HM3 LVAD on 3/23/23. His post-op course was relatively uncomplicated. He was started on ASA 81 mg daily, lisinopril 2.5 mg daily, digoxin and spironolactone 12.5 mg daily. He developed post-operative atrial fibrillation with RVR on 3/28. Started on amiodarone gtt and converted to NSR. He was transitioned to PO taper and decreased to 200 mg daily. He was started on coumadin for his LVAD and bridged with heparin. INR was therapeutic at discharge.      He was diuresed with intermittent boluses of IV bumex to his pre-operative weight and transitioned to bumex 1 mg PO daily on 4/6.      He had a post-operative leukocytosis. CXR showed a left sided consolidation concerning for pneumonia. He was started on empiric vanc/zosyn from 3/29 to 4/4. ID was consulted. Cultures remained negative and CT chest 4/3 showed no source of infection. ID consulted and recommending watching off antibiotics. Mr. Fragoso was discharged home on 4/9/2023.     Today, Mr. Fragoso presents to clinic with his wife. Since discharge he has been feeling quite well. Appetite is robust. He notes that his sleep schedule is disrupted but knows that this should improve with time. Last evening he had an episode of emesis after eating a bean and cheese burrito and pumpkin pie. Felt perfectly  fine after this episode and denies any recurrent nausea or vomiting. No chest pain. He went for a 30 minute walk with his son yesterday and denied any JOHNSON. No acute SOB. Some pain with deep inspiration due to fractured ribs. Denies any lightheadedness or dizziness, syncope, or near syncope. Denies any ICD shocks.     Home weights stable 163-164 lb    No blood in the urine or blood in the stool or prolonged nosebleeds.     No fevers or chills. Driveline redness or pain.  No driveline drainage.     No headaches or vision changes. No stroke symptoms.     No LVAD alarms.       VAD interrogation 04/12/23: VAD interrogation reviewed with VAD coordinator. Agree with findings. Rare PI events, no power spikes, speed drops, or other findings suspicious of pump malfunction noted.         Cardiac Medications  - ASA 81 mg daily  - Amiodarone 200 mg daily   - Bumex 1 mg daily   - Digoxin 250 mcg daily   - Jardiance 10 mg daily  - Lisinopril 2.5 mg daily   - Spironolactone 12.5 mg daily   - Warfarin         PAST MEDICAL HISTORY:  Past Medical History:   Diagnosis Date     Acute right lumbar radiculopathy 11/02/2015     Acute systolic heart failure (H) 01/10/2017     Cardiomyopathy (H) 01/10/2017     CKD (chronic kidney disease) stage 3, GFR 30-59 ml/min (H) 01/30/2020     JOHNSON (dyspnea on exertion)      ED (erectile dysfunction) 10/02/2019     Erectile dysfunction      ICD (implantable cardioverter-defibrillator) in place- [employer], single chamber- NOT dependent 10/09/2017     Personal history of smoking 01/04/2017     Pulmonary nodules 01/17/2017    CT 11/2018:  Bilateral pulmonary nodules measuring up to 4 mm. No new or enlarging pulmonary nodules.       FAMILY HISTORY:  Family History   Problem Relation Age of Onset     Parkinsonism Mother      Atrial fibrillation Father      Heart Failure Father      Prostate Cancer Father      Skin Cancer Father      Anorexia nervosa Daughter      Other - See Comments Granddaughter          premature birth       SOCIAL HISTORY:  Social History     Socioeconomic History     Marital status:      Spouse name: Cheryl     Number of children: 2   Occupational History     Occupation: Construction      Employer: SELF   Tobacco Use     Smoking status: Light Smoker     Packs/day: 0.25     Years: 15.00     Pack years: 3.75     Types: Cigarettes     Smokeless tobacco: Former     Quit date: 10/24/2011   Vaping Use     Vaping status: Never Used   Substance and Sexual Activity     Alcohol use: No     Alcohol/week: 0.0 standard drinks of alcohol     Drug use: No     Sexual activity: Yes     Partners: Female     Birth control/protection: Condom   Other Topics Concern     Parent/sibling w/ CABG, MI or angioplasty before 65F 55M? Yes     Comment: both parents had stints placed        CURRENT MEDICATIONS:  acetaminophen (TYLENOL) 325 MG tablet, Take 2 tablets (650 mg) by mouth every 4 hours as needed for other (For optimal non-opioid multimodal pain management to improve pain control.)  amiodarone (PACERONE) 200 MG tablet, Take 1 tablet (200 mg) by mouth daily  aspirin (ASA) 81 MG chewable tablet, 1 tablet (81 mg) by Oral or NG Tube route daily  bumetanide (BUMEX) 1 MG tablet, Take 1 tablet (1 mg) by mouth daily  digoxin (LANOXIN) 250 MCG tablet, Take 1 tablet (250 mcg) by mouth daily  empagliflozin (JARDIANCE) 10 MG TABS tablet, Take 1 tablet (10 mg) by mouth daily  gabapentin (NEURONTIN) 300 MG capsule, 1 capsule (300 mg) by Oral or Feeding Tube route 3 times daily  nystatin (MYCOSTATIN) 783609 UNIT/ML suspension, Take 5 mLs (500,000 Units) by mouth 4 times daily  oxyCODONE IR (ROXICODONE) 5 MG tablet, Take 1-2 tablets (5-10 mg) by mouth every 6 hours as needed for severe pain  pantoprazole (PROTONIX) 40 MG EC tablet, Take 1 tablet (40 mg) by mouth every morning (before breakfast)  senna-docusate (SENOKOT-S/PERICOLACE) 8.6-50 MG tablet, Take 2 tablets by mouth 2 times daily as needed for  constipation  spironolactone (ALDACTONE) 25 MG tablet, Take 0.5 tablets (12.5 mg) by mouth daily  warfarin ANTICOAGULANT (COUMADIN) 5 MG tablet, Take 1 tablet (5 mg) by mouth at 6 pm on 4/9 and have INR check 4/10 and have dose adjusted  methocarbamol (ROBAXIN) 500 MG tablet, Take 1 tablet (500 mg) by mouth 4 times daily (Patient not taking: Reported on 4/12/2023)  polyethylene glycol (MIRALAX) 17 GM/Dose powder, Take 17 g by mouth daily (Patient not taking: Reported on 4/12/2023)    No current facility-administered medications on file prior to visit.      ROS:   CONSTITUTIONAL: Denies fever, chills, fatigue, or weight fluctuations.   HEENT: Denies headache, vision changes, and changes in speech.   CV: Refer to HPI.   PULMONARY:Refer to HPI.   GI:Denies nausea, vomiting, diarrhea, and abdominal pain. Bowel movements are regular.   :Denies urinary alterations, dysuria, urinary frequency, hematuria, and abnormal drainage.   EXT:Denies lower extremity edema.   SKIN:Denies abnormal rashes or lesions.   MUSCULOSKELETAL:Denies upper or lower extremity weakness and pain.   NEUROLOGIC:Denies lightheadedness, dizziness, seizures, or upper or lower extremity paresthesia.     EXAM:  BP (!) 82/0 (BP Location: Right arm, Patient Position: Chair, Cuff Size: Adult Regular)   Pulse 76   SpO2 93%     GENERAL: Appears comfortable, in no distress .  HEENT: Eye symmetrical and without discharge or icterus bilaterally. Mucous membranes moist and without lesions.  NECK: Supple, JVD 4 cm.   CV: RRR, +S1S2, + LVAD hum  RESPIRATORY: Respirations regular, even, and unlabored. Lungs CTA throughout.   GI: Soft and non distended with normoactive bowel sounds present in all quadrants. No tenderness, rebound, guarding. No organomegaly.   EXTREMITIES: No peripheral edema. Non-pulsatile.   NEUROLOGIC: Alert and orientated x 3.  No focal deficits.   MUSCULOSKELETAL: No joint swelling or tenderness.   SKIN: No jaundice. No rashes or lesions.  Driveline dressing CDI.    Labs - as reviewed in clinic with patient today:  CBC RESULTS:  Lab Results   Component Value Date    WBC 13.4 (H) 04/12/2023    WBC 9.8 11/20/2019    RBC 3.79 (L) 04/12/2023    RBC 4.70 11/20/2019    HGB 11.0 (L) 04/12/2023    HGB 14.3 11/20/2019    HCT 35.7 (L) 04/12/2023    HCT 45.3 11/20/2019    MCV 94 04/12/2023    MCV 96 11/20/2019    MCH 29.0 04/12/2023    MCH 30.4 11/20/2019    MCHC 30.8 (L) 04/12/2023    MCHC 31.6 11/20/2019    RDW 14.9 04/12/2023    RDW 13.0 11/20/2019     (H) 04/12/2023     11/20/2019       CMP RESULTS:  Lab Results   Component Value Date     04/12/2023     11/20/2019    POTASSIUM 4.1 04/12/2023    POTASSIUM 3.3 (L) 03/23/2023    POTASSIUM 4.5 11/20/2019    CHLORIDE 95 (L) 04/12/2023    CHLORIDE 105 11/20/2019    CO2 33 (H) 04/12/2023    CO2 28 11/20/2019    ANIONGAP 9 04/12/2023    ANIONGAP 5 11/20/2019     (H) 04/12/2023     (H) 04/09/2023     (H) 11/20/2019    BUN 18.2 04/12/2023    BUN 23 11/20/2019    CR 1.12 04/12/2023    CR 0.90 11/20/2019    GFRESTIMATED 78 04/12/2023    GFRESTIMATED >90 11/20/2019    GFRESTBLACK >90 11/20/2019    TONY 9.3 04/12/2023    TONY 8.7 11/20/2019    BILITOTAL 0.6 04/12/2023    BILITOTAL 0.3 11/20/2019    ALBUMIN 3.7 04/12/2023    ALBUMIN 3.6 11/20/2019    ALKPHOS 140 (H) 04/12/2023    ALKPHOS 68 11/20/2019    ALT 26 04/12/2023    ALT 22 11/20/2019    AST 32 04/12/2023    AST 16 11/20/2019        INR RESULTS:  Lab Results   Component Value Date    INR 3.01 (H) 04/12/2023    INR 1.01 11/19/2018       Lab Results   Component Value Date    MAG 1.9 04/08/2023    MAG 2.1 01/12/2017     Lab Results   Component Value Date    NTBNPI 31,087 (H) 03/20/2023    NTBNPI 11,183 (H) 01/09/2017     Lab Results   Component Value Date    NTBNP 4,219 (H) 04/12/2023    NTBNP 2,306 (H) 11/20/2019         Cardiac Diagnostics    4/12/23 Device Interrogation      Device: [employer] D150 DYNAGEN  Normal  device function  Mode: VVI 40 bpm  : 0%  Intrinsic rhythm: VS @ 91 bpm  R waves measured 2 mV today which is not a new finding since LVAD implant on 3/23/23. RV threshold measured 1.5 V @ 0.4 ms  Estimated battery longevity to RRT = 10.5 years  Anticoagulant: warfarin  Ventricular Arrhythmia: 1 episode recorded in the VT monitor zone on 3/28/23 @ 1303 - 14 sec, 175 bpm  Setting Changes: None  Patient has an appointment to see VERONICA Byrd, CNP today.   Plan: RTC in 3 months    3/28/2023 Echo  Interpretation Summary  Technically difficult study.Extremely poor acoustic windows.  Limited information was obtained during study.  LVAD cannula was seen in the expected anatomic position in the LV apex.  HM3 at 5200RPM.  Septum normal.  LVIDd 56mm.  Aortic valve not well visualized. No AI.  Normal outflow velocity.  Global right ventricular function is moderately to severely reduced.  Small pericardial effusion with organizing material in pericardial cavity.      Assessment and Plan:   Mr. Fragoso is a 57 year old male with medical history pertinent for HFrEF (10%) secondary to NICM (suspected viral etiology) s/p ICD, moderate TR s/p TV repair, pAF, chronic tobacco/marijuana use, COPD, HTN, CKD stage III who was admitted to [facility] on 3/15/23 following a VT/VF arrest. He underwent chest compressions and ROSC was obtained within 6 minutes. He was evaluated for placement of an LVAD and completed an extensive pre-operative work-up.  On 3/23/23 he underwent placement of HM3 LVAD. He presents to clinic for hospital follow up.    Appearing and feeling well. He is euvolemic by exam. No LVAD pump alarms or other concerning findings. Review of today's labs are stable with a Cr of 1.12 and lytes wnl. Hgb 11. MAPs 80s. Increase lisinopril to 5 mg daily. ICD interrogation with one episode of VT on 3/28. Reasonable to consider zio to monitor for AF and if negative, stop amiodarone.     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 NYHA Class III.    Fluid status: euvolemic Bumex 1 mg PO daily  RV support/rate control: digoxin 250 mcg daily, last level 1.8 (for rate control)  ACEi/ARB/ARNi/afterload reduction: increase lisinopril to 5 mg daily  BB: deferred due to recent VAD  Aldosterone antagonist: spironolactone 12.5 mg daily  SGLT2i: empagliflozin 10 mg daily  SCD prophylaxis: ICD, reprogrammed device to provide ATP burst at VT threshold  MAP: 82  LDH trends: 365, stable  Anticoagulation: warfarin dosing per pharmacy, dose increased yesterday, INR goal 2-3, today 3.01  Antiplatelet: ASA 81 mg daily     # Atrial fibrillation with RVR 3/28, resolved  Received adenosine 3/28/23 which slowed rate. Then started on amiodarone gtt.  - continue amiodarone 200 mg daily, will need to discuss duration.   - consider Zio    # Mild leukocytopenia, improving  No localizing symptoms of infection.   - WBC 13.4, trending down.         Follow up:  - VAD RABIA 4/18/23        Mile Bolivar DNP, NP-C  Advance Heart Failure  04/12/23

## 2023-04-13 NOTE — PROGRESS NOTES
S: Patient paged to report low PI  B: Normal PI has been mid 3's, however as he was going to bed, he noticed a PI of 2.1. He has felt fine all day, went to a clinic appointment, has been walking and cooking.   A: Reports no dizziness, light headedness or other alarms. He is unable to say how much water he has drank today, but states it's probably not as much as he usually does.        04/12/23 2100   Heartmate 3 LEFT VS   Flow (Lpm) 4.9 Lpm   Pulse Index (PI) 2.1 PI   Speed (rpm) 5200 rpm   Power (mari) 3.7 mari     R: Reeducated wife and patient that PI can be an indicator of fluid status or blood pressure. In this situation, it seems as if he may be a little dehydrated from all of the activities and weather today. Reminded him to keep track of his fluid intake as it can be helpful if he becomes symptomatic or has an alarm. Patient and wife verbalized understanding.

## 2023-04-14 ENCOUNTER — VIRTUAL VISIT (OUTPATIENT)
Dept: PHARMACY | Facility: PHYSICIAN GROUP | Age: 58
End: 2023-04-14
Attending: INTERNAL MEDICINE
Payer: COMMERCIAL

## 2023-04-14 ENCOUNTER — CARE COORDINATION (OUTPATIENT)
Dept: CARDIOLOGY | Facility: CLINIC | Age: 58
End: 2023-04-14
Payer: COMMERCIAL

## 2023-04-14 DIAGNOSIS — B37.0 THRUSH: ICD-10-CM

## 2023-04-14 DIAGNOSIS — K21.9 GASTROESOPHAGEAL REFLUX DISEASE, UNSPECIFIED WHETHER ESOPHAGITIS PRESENT: ICD-10-CM

## 2023-04-14 DIAGNOSIS — Z95.811 LVAD (LEFT VENTRICULAR ASSIST DEVICE) PRESENT (H): ICD-10-CM

## 2023-04-14 DIAGNOSIS — Z95.811 LEFT VENTRICULAR ASSIST DEVICE PRESENT (H): Primary | ICD-10-CM

## 2023-04-14 DIAGNOSIS — I50.22 CHRONIC SYSTOLIC CONGESTIVE HEART FAILURE (H): Primary | ICD-10-CM

## 2023-04-14 DIAGNOSIS — I50.22 CHRONIC SYSTOLIC HEART FAILURE (H): ICD-10-CM

## 2023-04-14 DIAGNOSIS — R52 PAIN: ICD-10-CM

## 2023-04-14 DIAGNOSIS — I10 BENIGN ESSENTIAL HYPERTENSION: ICD-10-CM

## 2023-04-14 DIAGNOSIS — K59.03 DRUG-INDUCED CONSTIPATION: ICD-10-CM

## 2023-04-14 PROCEDURE — 99207 PR NO CHARGE LOS: CPT

## 2023-04-14 RX ORDER — AMOXICILLIN 500 MG/1
2000 TABLET, FILM COATED ORAL
Qty: 4 TABLET | Refills: 0 | Status: SHIPPED | OUTPATIENT
Start: 2023-04-14 | End: 2023-05-11

## 2023-04-14 NOTE — PATIENT INSTRUCTIONS
"Recommendations from today's MTM visit:                                                    MTM (medication therapy management) is a service provided by a clinical pharmacist designed to help you get the most of out of your medicines.   Today we reviewed what your medicines are for, how to know if they are working, that your medicines are safe and how to make your medicine regimen as easy as possible.      1. Here is the link to the Jardiance web site to sign up for a cost-savings co-pay card: [email or web address]/savings-support/savings/. Reach out with any medication questions or concerns!    Follow-up: As needed.     It was great speaking with you today.  I value your experience and would be very thankful for your time in providing feedback in our clinic survey. In the next few days, you may receive an email or text message from [employer] with a link to a survey related to your  clinical pharmacist.\"     To schedule another MTM appointment, please call the clinic directly or you may call the MTM scheduling line at [phone] or toll-free at [phone].     My Clinical Pharmacist's contact information:                                                      Please feel free to contact me with any questions or concerns you have.      Calvin Evans, PharmD  Medication Therapy Management Pharmacist  [facility]  Office phone: [phone]     "

## 2023-04-14 NOTE — PROGRESS NOTES
Addendum 4/14/23 Spoke with spouse Landry regarding new maintenance dose. Guilherme communicated understanding.    Willa Broderick RN

## 2023-04-14 NOTE — PROGRESS NOTES
Medication Therapy Management (MTM) Encounter    ASSESSMENT:                            Medication Adherence/Access: No issues identified    S/P LVAD Placement/Hypertension/HFrEF: Stable. Patient is following closely with cardiology team for medication adjustments as needed. Patient would benefit from information on Jardiance cost-saving measures.    Pain: Stable.    Thrush: Stable.    GERD: Stable.     Constipation: Stable.    PLAN:                            1. Here is the link to the Jardiance web site to sign up for a cost-savings co-pay card: [email or web address]-support/savings/. Reach out with any medication questions or concerns!    Follow-up: As needed.     SUBJECTIVE/OBJECTIVE:                          Akshat Fragoso is a 57 year old male called for a transitions of care visit. He was discharged from [facility] on 04/09/2023 for HFrEF s/p LVAD placement. Patient was accompanied by his wife, Cheryl.     Reason for visit: Transitions of care visit.    Allergies/ADRs: Reviewed in chart  Past Medical History: Reviewed in chart  Tobacco: He reports that he has quit smoking. His smoking use included cigarettes. He has a 3.75 pack-year smoking history. He quit smokeless tobacco use about 11 years ago.  Alcohol: None.  Caffeine: 1-2 miniature Coca-Bryan per day.     Medication Adherence/Access: no issues reported  Patient uses pill box(es).  Patient takes medications 3 time(s) per day.   Per patient, misses medication 0 times per week.   Medication barriers: none.   The patient fills medications at [facility]: NO, fills medications at [facility].    S/P LVAD Placement/Hypertension/HFrEF: Current medications include amiodarone 200 mg daily, bumetanide 1 mg daily, digoxin 250 mcg daily, lisinopril 5 mg daily, spironolactone 12.5 mg daily, warfarin as dosed by anticoagulation clinic, aspirin 81 mg daily, empagliflozin 10 mg  daily.  Patient does not self-monitor blood pressure.  Patient reports no current medication side effects, no increased bleeding or bruising with warfarin. Patient reports that the Jardiance co-pay of $75 per month is expensive.   BP Readings from Last 3 Encounters:   04/12/23 (!) 82/0   04/08/23 111/71   01/28/21 117/74     Pain:  Current medications include: gabapentin 300 mg three times daily, methocarbamol 500 mg four times daily (not taking currently), oxycodone 5-10 mg every 6 hours as needed (how often? 5-10 mg every 6 to 8 hours as needed for rib pain), acetaminophen 650 mg every 4 hours as needed (how often? scheduled). Pain is mainly in his ribs, some in the surgery site. Patient reports the following side effects: denies side effects.    Thrush: Current medications include nystatin 930021 unit/mL 5 mL by mouth four times daily as needed. Patient reports this is effective when he needs it.     GERD: Current medications include: Protonix (pantoprazole) 40 mg once daily. Patient reports no current symptoms.  Patient feels that current regimen is effective.    Constipation: Current medications include polyethylene glycol 17 grams by mouth daily (not taking currently), senna-docusate 8.6-50 mg 2 tablets twice daily as needed. Patient reports no trouble with these medications or with constipation.     Today's Vitals: There were no vitals taken for this visit.  ----------------  Post Discharge Medication Reconciliation Status: discharge medications reconciled, continue medications without change.    I spent 31 minutes with this patient today. I offer these suggestions for consideration by Dr. Darian Estevez MD.  A copy of the visit note was provided to the patient's provider(s).    A summary of these recommendations was sent via [employer].    Calvin Evans, PharmD  Medication Therapy Management Pharmacist  [facility]  Office phone: [phone]    Telemedicine Visit Details  Type of service:  Telephone  visit  Start Time: 1:01 PM  End Time: 1:32 PM     Medication Therapy Recommendations  Chronic systolic heart failure (H)    Current Medication: empagliflozin (JARDIANCE) 10 MG TABS tablet   Rationale: Cannot afford medication product - Cost - Adherence   Recommendation: Referral to Service    Status: Patient Agreed - Adherence/Education

## 2023-04-14 NOTE — Clinical Note
Hi Dr. Estevez,  It looks like Akshat is planning to follow with you as his primary care provider, so I am routing my MT transitions of care note to you for your review.   Reach out with any questions or concerns! Calvin Evans, PharmD Medication Therapy Management Pharmacist [facility] Office phone: [phone]

## 2023-04-15 NOTE — PROGRESS NOTES
Landry called with questions about Akshat's need for a dental procedure. She said part of his crown or post fell off or splintered leaving a sharp edge that is making chewing not possible. She said they would be calling his on-call dentist but were wondering about the dental antibiotic. I sent the amoxicillin antibiotic one time dose to Akshat's pharmacy. I told her he should take this dose within one hour of a dentist working on the tooth. I reminded them to tell the dentist that Akshat is on aspirin and coumadin with an INR goal range of 2-3. If the dentist has questions, he or she should call the VAD coordinator on call.

## 2023-04-17 LAB
MDC_IDC_LEAD_IMPLANT_DT: NORMAL
MDC_IDC_LEAD_LOCATION: NORMAL
MDC_IDC_LEAD_LOCATION_DETAIL_1: NORMAL
MDC_IDC_LEAD_MFG: NORMAL
MDC_IDC_LEAD_MODEL: NORMAL
MDC_IDC_LEAD_POLARITY_TYPE: NORMAL
MDC_IDC_LEAD_SERIAL: NORMAL
MDC_IDC_MSMT_BATTERY_DTM: NORMAL
MDC_IDC_MSMT_BATTERY_REMAINING_LONGEVITY: 126 MO
MDC_IDC_MSMT_BATTERY_REMAINING_PERCENTAGE: 100 %
MDC_IDC_MSMT_BATTERY_STATUS: NORMAL
MDC_IDC_MSMT_CAP_CHARGE_DTM: NORMAL
MDC_IDC_MSMT_CAP_CHARGE_TIME: 10.4 S
MDC_IDC_MSMT_CAP_CHARGE_TYPE: NORMAL
MDC_IDC_MSMT_LEADCHNL_RV_IMPEDANCE_VALUE: 370 OHM
MDC_IDC_MSMT_LEADCHNL_RV_LEAD_CHANNEL_STATUS: NORMAL
MDC_IDC_MSMT_LEADCHNL_RV_PACING_THRESHOLD_AMPLITUDE: 1.5 V
MDC_IDC_MSMT_LEADCHNL_RV_PACING_THRESHOLD_PULSEWIDTH: 0.4 MS
MDC_IDC_PG_IMPLANT_DTM: NORMAL
MDC_IDC_PG_MFG: NORMAL
MDC_IDC_PG_MODEL: NORMAL
MDC_IDC_PG_SERIAL: NORMAL
MDC_IDC_PG_TYPE: NORMAL
MDC_IDC_SESS_CLINIC_NAME: NORMAL
MDC_IDC_SESS_DTM: NORMAL
MDC_IDC_SESS_TYPE: NORMAL
MDC_IDC_SET_BRADY_LOWRATE: 40 {BEATS}/MIN
MDC_IDC_SET_BRADY_MODE: NORMAL
MDC_IDC_SET_LEADCHNL_RV_PACING_AMPLITUDE: 2.8 V
MDC_IDC_SET_LEADCHNL_RV_PACING_POLARITY: NORMAL
MDC_IDC_SET_LEADCHNL_RV_PACING_PULSEWIDTH: 0.4 MS
MDC_IDC_SET_LEADCHNL_RV_SENSING_ADAPTATION_MODE: NORMAL
MDC_IDC_SET_LEADCHNL_RV_SENSING_POLARITY: NORMAL
MDC_IDC_SET_LEADCHNL_RV_SENSING_SENSITIVITY: 0.6 MV
MDC_IDC_SET_ZONE_DETECTION_INTERVAL: 300 MS
MDC_IDC_SET_ZONE_DETECTION_INTERVAL: 353 MS
MDC_IDC_SET_ZONE_TYPE: NORMAL
MDC_IDC_SET_ZONE_TYPE: NORMAL
MDC_IDC_SET_ZONE_VENDOR_TYPE: NORMAL
MDC_IDC_SET_ZONE_VENDOR_TYPE: NORMAL
MDC_IDC_STAT_BRADY_DTM_END: NORMAL
MDC_IDC_STAT_BRADY_DTM_START: NORMAL
MDC_IDC_STAT_BRADY_RV_PERCENT_PACED: 0 %
MDC_IDC_STAT_EPISODE_RECENT_COUNT: 0
MDC_IDC_STAT_EPISODE_RECENT_COUNT: 1
MDC_IDC_STAT_EPISODE_RECENT_COUNT: 1
MDC_IDC_STAT_EPISODE_RECENT_COUNT_DTM_END: NORMAL
MDC_IDC_STAT_EPISODE_RECENT_COUNT_DTM_START: NORMAL
MDC_IDC_STAT_EPISODE_TYPE: NORMAL
MDC_IDC_STAT_EPISODE_VENDOR_TYPE: NORMAL
MDC_IDC_STAT_TACHYTHERAPY_ATP_DELIVERED_RECENT: 0
MDC_IDC_STAT_TACHYTHERAPY_ATP_DELIVERED_TOTAL: 1
MDC_IDC_STAT_TACHYTHERAPY_RECENT_DTM_END: NORMAL
MDC_IDC_STAT_TACHYTHERAPY_RECENT_DTM_START: NORMAL
MDC_IDC_STAT_TACHYTHERAPY_SHOCKS_ABORTED_RECENT: 0
MDC_IDC_STAT_TACHYTHERAPY_SHOCKS_ABORTED_TOTAL: 0
MDC_IDC_STAT_TACHYTHERAPY_SHOCKS_DELIVERED_RECENT: 0
MDC_IDC_STAT_TACHYTHERAPY_SHOCKS_DELIVERED_TOTAL: 0
MDC_IDC_STAT_TACHYTHERAPY_TOTAL_DTM_END: NORMAL
MDC_IDC_STAT_TACHYTHERAPY_TOTAL_DTM_START: NORMAL

## 2023-04-18 ENCOUNTER — ANTICOAGULATION THERAPY VISIT (OUTPATIENT)
Dept: ANTICOAGULATION | Facility: CLINIC | Age: 58
End: 2023-04-18

## 2023-04-18 ENCOUNTER — CARE COORDINATION (OUTPATIENT)
Dept: CARDIOLOGY | Facility: CLINIC | Age: 58
End: 2023-04-18

## 2023-04-18 ENCOUNTER — ANCILLARY PROCEDURE (OUTPATIENT)
Dept: CT IMAGING | Facility: CLINIC | Age: 58
End: 2023-04-18
Attending: NURSE PRACTITIONER
Payer: COMMERCIAL

## 2023-04-18 ENCOUNTER — OFFICE VISIT (OUTPATIENT)
Dept: CARDIOLOGY | Facility: CLINIC | Age: 58
End: 2023-04-18
Attending: INTERNAL MEDICINE
Payer: COMMERCIAL

## 2023-04-18 ENCOUNTER — LAB (OUTPATIENT)
Dept: LAB | Facility: CLINIC | Age: 58
End: 2023-04-18
Payer: COMMERCIAL

## 2023-04-18 VITALS
SYSTOLIC BLOOD PRESSURE: 73 MMHG | OXYGEN SATURATION: 95 % | BODY MASS INDEX: 26.86 KG/M2 | HEART RATE: 89 BPM | WEIGHT: 171.5 LBS

## 2023-04-18 DIAGNOSIS — Z95.811 LEFT VENTRICULAR ASSIST DEVICE PRESENT (H): ICD-10-CM

## 2023-04-18 DIAGNOSIS — Z95.811 LVAD (LEFT VENTRICULAR ASSIST DEVICE) PRESENT (H): ICD-10-CM

## 2023-04-18 DIAGNOSIS — Z95.811 LVAD (LEFT VENTRICULAR ASSIST DEVICE) PRESENT (H): Primary | ICD-10-CM

## 2023-04-18 DIAGNOSIS — D72.829 LEUKOCYTOSIS, UNSPECIFIED TYPE: ICD-10-CM

## 2023-04-18 DIAGNOSIS — I50.22 CHRONIC SYSTOLIC CONGESTIVE HEART FAILURE (H): ICD-10-CM

## 2023-04-18 DIAGNOSIS — R05.9 COUGH, UNSPECIFIED TYPE: ICD-10-CM

## 2023-04-18 DIAGNOSIS — I50.22 CHRONIC SYSTOLIC CONGESTIVE HEART FAILURE (H): Primary | ICD-10-CM

## 2023-04-18 DIAGNOSIS — I50.23 ACUTE ON CHRONIC SYSTOLIC CONGESTIVE HEART FAILURE (H): Primary | ICD-10-CM

## 2023-04-18 DIAGNOSIS — Z79.899 LONG TERM USE OF DRUG: ICD-10-CM

## 2023-04-18 LAB
ALBUMIN SERPL BCG-MCNC: 3.8 G/DL (ref 3.5–5.2)
ALP SERPL-CCNC: 134 U/L (ref 40–129)
ALT SERPL W P-5'-P-CCNC: 22 U/L (ref 10–50)
ANION GAP SERPL CALCULATED.3IONS-SCNC: 8 MMOL/L (ref 7–15)
AST SERPL W P-5'-P-CCNC: 30 U/L (ref 10–50)
BASOPHILS # BLD AUTO: 0.2 10E3/UL (ref 0–0.2)
BASOPHILS NFR BLD AUTO: 1 %
BILIRUB SERPL-MCNC: 0.5 MG/DL
BUN SERPL-MCNC: 17.1 MG/DL (ref 6–20)
CALCIUM SERPL-MCNC: 9.2 MG/DL (ref 8.6–10)
CHLORIDE SERPL-SCNC: 95 MMOL/L (ref 98–107)
CREAT SERPL-MCNC: 1.07 MG/DL (ref 0.67–1.17)
CRP SERPL-MCNC: 23.2 MG/L
D DIMER PPP FEU-MCNC: 3.6 UG/ML FEU (ref 0–0.5)
DEPRECATED HCO3 PLAS-SCNC: 35 MMOL/L (ref 22–29)
EOSINOPHIL # BLD AUTO: 1.4 10E3/UL (ref 0–0.7)
EOSINOPHIL NFR BLD AUTO: 10 %
ERYTHROCYTE [DISTWIDTH] IN BLOOD BY AUTOMATED COUNT: 14.8 % (ref 10–15)
GFR SERPL CREATININE-BSD FRML MDRD: 81 ML/MIN/1.73M2
GLUCOSE SERPL-MCNC: 93 MG/DL (ref 70–99)
HCT VFR BLD AUTO: 36.3 % (ref 40–53)
HGB BLD-MCNC: 11.1 G/DL (ref 13.3–17.7)
IMM GRANULOCYTES # BLD: 0.2 10E3/UL
IMM GRANULOCYTES NFR BLD: 1 %
INR PPP: 3.67 (ref 0.85–1.15)
IRON BINDING CAPACITY (ROCHE): 284 UG/DL (ref 240–430)
IRON SATN MFR SERPL: 11 % (ref 15–46)
IRON SERPL-MCNC: 31 UG/DL (ref 61–157)
LDH SERPL L TO P-CCNC: 339 U/L (ref 0–250)
LYMPHOCYTES # BLD AUTO: 2.7 10E3/UL (ref 0.8–5.3)
LYMPHOCYTES NFR BLD AUTO: 19 %
MCH RBC QN AUTO: 28.8 PG (ref 26.5–33)
MCHC RBC AUTO-ENTMCNC: 30.6 G/DL (ref 31.5–36.5)
MCV RBC AUTO: 94 FL (ref 78–100)
MONOCYTES # BLD AUTO: 1.4 10E3/UL (ref 0–1.3)
MONOCYTES NFR BLD AUTO: 10 %
NEUTROPHILS # BLD AUTO: 8.2 10E3/UL (ref 1.6–8.3)
NEUTROPHILS NFR BLD AUTO: 59 %
NRBC # BLD AUTO: 0 10E3/UL
NRBC BLD AUTO-RTO: 0 /100
NT-PROBNP SERPL-MCNC: 7719 PG/ML (ref 0–900)
PLATELET # BLD AUTO: 396 10E3/UL (ref 150–450)
POTASSIUM SERPL-SCNC: 4 MMOL/L (ref 3.4–5.3)
PROT SERPL-MCNC: 6.6 G/DL (ref 6.4–8.3)
RBC # BLD AUTO: 3.85 10E6/UL (ref 4.4–5.9)
SODIUM SERPL-SCNC: 138 MMOL/L (ref 136–145)
TRANSFERRIN SERPL-MCNC: 258 MG/DL (ref 200–360)
WBC # BLD AUTO: 14.1 10E3/UL (ref 4–11)

## 2023-04-18 PROCEDURE — 83540 ASSAY OF IRON: CPT | Performed by: PATHOLOGY

## 2023-04-18 PROCEDURE — 83880 ASSAY OF NATRIURETIC PEPTIDE: CPT | Performed by: PATHOLOGY

## 2023-04-18 PROCEDURE — 86140 C-REACTIVE PROTEIN: CPT | Mod: 90 | Performed by: PATHOLOGY

## 2023-04-18 PROCEDURE — 36415 COLL VENOUS BLD VENIPUNCTURE: CPT | Performed by: PATHOLOGY

## 2023-04-18 PROCEDURE — 80053 COMPREHEN METABOLIC PANEL: CPT | Performed by: PATHOLOGY

## 2023-04-18 PROCEDURE — 99000 SPECIMEN HANDLING OFFICE-LAB: CPT | Performed by: PATHOLOGY

## 2023-04-18 PROCEDURE — 85025 COMPLETE CBC W/AUTO DIFF WBC: CPT | Performed by: PATHOLOGY

## 2023-04-18 PROCEDURE — 85610 PROTHROMBIN TIME: CPT | Performed by: PATHOLOGY

## 2023-04-18 PROCEDURE — 93750 INTERROGATION VAD IN PERSON: CPT | Performed by: NURSE PRACTITIONER

## 2023-04-18 PROCEDURE — 71250 CT THORAX DX C-: CPT | Performed by: RADIOLOGY

## 2023-04-18 PROCEDURE — 74176 CT ABD & PELVIS W/O CONTRAST: CPT | Performed by: RADIOLOGY

## 2023-04-18 PROCEDURE — G0463 HOSPITAL OUTPT CLINIC VISIT: HCPCS | Mod: 25 | Performed by: NURSE PRACTITIONER

## 2023-04-18 PROCEDURE — 84466 ASSAY OF TRANSFERRIN: CPT | Mod: 90 | Performed by: PATHOLOGY

## 2023-04-18 PROCEDURE — 99215 OFFICE O/P EST HI 40 MIN: CPT | Mod: 24 | Performed by: NURSE PRACTITIONER

## 2023-04-18 PROCEDURE — 85379 FIBRIN DEGRADATION QUANT: CPT | Mod: 90 | Performed by: PATHOLOGY

## 2023-04-18 PROCEDURE — 83615 LACTATE (LD) (LDH) ENZYME: CPT | Performed by: PATHOLOGY

## 2023-04-18 RX ORDER — OXYCODONE HYDROCHLORIDE 5 MG/1
5 TABLET ORAL EVERY 8 HOURS PRN
Qty: 14 TABLET | Refills: 0 | Status: SHIPPED | OUTPATIENT
Start: 2023-04-18 | End: 2023-05-01

## 2023-04-18 RX ORDER — OXYCODONE HYDROCHLORIDE 5 MG/1
5-10 TABLET ORAL EVERY 8 HOURS PRN
Qty: 14 TABLET | Refills: 0 | Status: SHIPPED | OUTPATIENT
Start: 2023-04-18 | End: 2023-04-18

## 2023-04-18 RX ORDER — LISINOPRIL 5 MG/1
10 TABLET ORAL DAILY
Qty: 90 TABLET | Refills: 3 | COMMUNITY
Start: 2023-04-18 | End: 2023-04-25

## 2023-04-18 ASSESSMENT — PAIN SCALES - GENERAL: PAINLEVEL: NO PAIN (0)

## 2023-04-18 NOTE — PROGRESS NOTES
ANTICOAGULATION MANAGEMENT     Akshat Fragoso 58 year old male is on warfarin with supratherapeutic INR result. (Goal INR 2.0-3.0)    Recent labs: (last 7 days)     04/18/23  1507   INR 3.67*       ASSESSMENT       Source(s): Chart Review       Warfarin doses taken: Reviewed in chart    Diet: No new diet changes identified    Medication/supplement changes: Looks like was advised to use Tylenol 1000 mg every 8 Hours since patient is out of Oxycodone.    New illness, injury, or hospitalization: Yes: per chart review patient is having rib discomfort since discharge from the hospital.    Signs or symptoms of bleeding or clotting: No    Previous INR: Supratherapeutic    Additional findings: None         PLAN     Recommended plan for no diet, medication or health factor changes affecting INR     Dosing Instructions: partial hold then decrease your warfarin dose (12.5% change) with next INR in 1 week       Summary  As of 4/18/2023    Full warfarin instructions:  4/18: 2.5 mg; Otherwise 5 mg every day   Next INR check:  4/25/2023             Detailed voice message left for Akshat with dosing instructions and follow up date.   Sent [employer] message with dosing and follow up instructions    Contact [phone] to schedule and with any changes, questions or concerns.     Education provided:     Please call back if any changes to your diet, medications or how you've been taking warfarin    Contact [phone] with any changes, questions or concerns.     Plan made per [facility] anticoagulation protocol and per LVAD protocol    Willa Broderick RN  Anticoagulation Clinic  4/18/2023    _______________________________________________________________________     Anticoagulation Episode Summary     Current INR goal:  2.0-3.0   TTR:  0.0 % (1 d)   Target end date:  Indefinite   Send INR reminders to:  ANTICOAG LVAD    Indications    Acute on chronic systolic congestive heart failure (H) [I50.23]  LVAD (left ventricular assist device) present  (H) [Z95.811]  Chronic systolic congestive heart failure (H) [I50.22]  Long term use of drug [Z79.899]  Left ventricular assist device present (H) [Z95.811]           Comments:  Follow VAD Anticoag protocol:Yes: HeartMate 3   Bridging: Call MD each time   Date VAD placed: 3/23/23         Anticoagulation Care Providers     Provider Role Specialty Phone number    Kenzie Moreau MD Referring Advanced Heart Failure and Transplant Cardiology [phone]    Mile Bolivar, VERONICA CNP Referring Nurse Practitioner [phone]

## 2023-04-18 NOTE — LETTER
4/18/2023      RE: Akshat Fragoso  [address]  [facility] [address]       Dear Colleague,    Thank you for the opportunity to participate in the care of your patient, Akshat Fragoso, at the [facility]. Please see a copy of my visit note below.    HPI:   Mr. Fragoso is a 57 year old male with medical history pertinent for HFrEF (10%) secondary to NICM (suspected viral etiology) s/p ICD, moderate TR s/p TV repair, pAF, chronic tobacco/marijuana use, COPD, HTN, CKD stage III who was admitted to [facility] on 3/15/23 following a VT/VF arrest. He underwent chest compressions and ROSC was obtained within 6 minutes. He then underwent HM III LVAD 3/23/23. His postoperative course was complicated by Afib with RVR prompting IV Amiodarone gtt with conversion to NSR, hypervolemia requiring diuresis, and leukocytosis concerning for HCAP prompting ID consultation with recommendation to watch off antibiotics. He presents to clinic for hospital follow up.     He continues to struggle with substernal and left breast pain. He notes persistent mild productive cough. He denies lightheadedness, dizziness, changes in speech, fever, chills, chest pain, JOHNSON, PND, nausea, vomiting, diarrhea, melena, hematochezia, and LE edema. He denies any concerns at his LVAD drive line site. His weight has trended down to 171 lbs He continues to maintain a low sodium diet. He underwent a dental cap repair today with pretreatment with Amoxicillin.     VAD Interrogation on : VAD interrogation reviewed with VAD coordinator. Agree with findings. No PI events, power spikes, speed drops, or other findings suspicious of pump malfunction noted.     PAST MEDICAL HISTORY:  Past Medical History:   Diagnosis Date    Acute right lumbar radiculopathy 11/02/2015    Acute systolic heart failure (H) 01/10/2017    Cardiomyopathy (H) 01/10/2017    CKD (chronic kidney disease) stage 3,  GFR 30-59 ml/min (H) 01/30/2020    JOHNSON (dyspnea on exertion)     ED (erectile dysfunction) 10/02/2019    Erectile dysfunction     ICD (implantable cardioverter-defibrillator) in place- [employer], single chamber- NOT dependent 10/09/2017    Personal history of smoking 01/04/2017    Pulmonary nodules 01/17/2017    CT 11/2018:  Bilateral pulmonary nodules measuring up to 4 mm. No new or enlarging pulmonary nodules.       FAMILY HISTORY:  Family History   Problem Relation Age of Onset    Parkinsonism Mother     Atrial fibrillation Father     Heart Failure Father     Prostate Cancer Father     Skin Cancer Father     Anorexia nervosa Daughter     Other - See Comments Granddaughter         premature birth       SOCIAL HISTORY:  Social History     Socioeconomic History    Marital status:      Spouse name: Cheryl    Number of children: 2    Years of education: None    Highest education level: None   Occupational History    Occupation: Construction      Employer: SELF   Tobacco Use    Smoking status: Former     Packs/day: 0.25     Years: 15.00     Pack years: 3.75     Types: Cigarettes    Smokeless tobacco: Former     Quit date: 10/24/2011   Vaping Use    Vaping status: Never Used   Substance and Sexual Activity    Alcohol use: No     Alcohol/week: 0.0 standard drinks of alcohol    Drug use: No    Sexual activity: Yes     Partners: Female     Birth control/protection: Condom   Other Topics Concern    Parent/sibling w/ CABG, MI or angioplasty before 65F 55M? Yes     Comment: both parents had stints placed        CURRENT MEDICATIONS:  Outpatient Medications Prior to Visit   Medication Sig Dispense Refill    acetaminophen (TYLENOL) 325 MG tablet Take 2 tablets (650 mg) by mouth every 4 hours as needed for other (For optimal non-opioid multimodal pain management to improve pain control.) 100 tablet 0    amiodarone (PACERONE) 200 MG tablet Take 1 tablet (200 mg) by mouth daily 30 tablet 0    amoxicillin (AMOXIL)  500 MG tablet Take 4 tablets (2,000 mg) by mouth once as needed (Take one hour before dental procedure.) 4 tablet 0    aspirin (ASA) 81 MG chewable tablet 1 tablet (81 mg) by Oral or NG Tube route daily 30 tablet 0    bumetanide (BUMEX) 1 MG tablet Take 1 tablet (1 mg) by mouth daily 30 tablet 0    digoxin (LANOXIN) 250 MCG tablet Take 1 tablet (250 mcg) by mouth daily 30 tablet 0    empagliflozin (JARDIANCE) 10 MG TABS tablet Take 1 tablet (10 mg) by mouth daily 90 tablet 1    gabapentin (NEURONTIN) 300 MG capsule 1 capsule (300 mg) by Oral or Feeding Tube route 3 times daily 90 capsule 0    lisinopril (ZESTRIL) 5 MG tablet Take 2 tablets (10 mg) by mouth daily 90 tablet 3    nystatin (MYCOSTATIN) 871626 UNIT/ML suspension Take 5 mLs (500,000 Units) by mouth 4 times daily 100 mL 0    pantoprazole (PROTONIX) 40 MG EC tablet Take 1 tablet (40 mg) by mouth every morning (before breakfast) 30 tablet 0    senna-docusate (SENOKOT-S/PERICOLACE) 8.6-50 MG tablet Take 2 tablets by mouth 2 times daily as needed for constipation 40 tablet 0    spironolactone (ALDACTONE) 25 MG tablet Take 0.5 tablets (12.5 mg) by mouth daily 30 tablet 0    warfarin ANTICOAGULANT (COUMADIN) 5 MG tablet Take 1 tablet (5 mg) by mouth at 6 pm on 4/9 and have INR check 4/10 and have dose adjusted 60 tablet 0    methocarbamol (ROBAXIN) 500 MG tablet Take 1 tablet (500 mg) by mouth 4 times daily (Patient not taking: Reported on 4/12/2023) 60 tablet 0    polyethylene glycol (MIRALAX) 17 GM/Dose powder Take 17 g by mouth daily (Patient not taking: Reported on 4/12/2023) 510 g 0    lisinopril (ZESTRIL) 5 MG tablet Take 1 tablet (5 mg) by mouth daily 90 tablet 3    oxyCODONE IR (ROXICODONE) 5 MG tablet Take 1-2 tablets (5-10 mg) by mouth every 6 hours as needed for severe pain 60 tablet 0     No facility-administered medications prior to visit.       ROS:   CONSTITUTIONAL: Denies fever, chills, fatigue, or weight fluctuations.   HEENT: Denies headache,  vision changes, and changes in speech.   CV: Refer to HPI.   PULMONARY:Denies shortness of breath, cough, or previous TB exposure.   GI:Denies nausea, vomiting, diarrhea, and abdominal pain. Bowel movements are regular.   :Denies urinary alterations, dysuria, urinary frequency, hematuria, and abnormal drainage.   EXT:Denies lower extremity edema.   SKIN:Denies abnormal rashes or lesions.   MUSCULOSKELETAL: Complains of substernal CP and left breast pain.   NEUROLOGIC:Denies lightheadedness, dizziness, seizures, or upper or lower extremity paresthesia.     EXAM:  BP (!) 73/0 (BP Location: Right arm, Patient Position: Chair, Cuff Size: Adult Regular)   Pulse 89   Wt 77.8 kg (171 lb 8 oz)   SpO2 95%   BMI 26.86 kg/m    GENERAL: Appears alert and oriented times three.   HEENT: Eye symmetrical and free of discharge bilaterally. Mucous membranes moist and without lesions.  NECK: Supple and without lymphadenopathy. JVD below clavicular line upright.   CV: RRR, S1S2 present with LVAD hum.  RESPIRATORY: Respirations regular, even, and unlabored. Lungs CTA throughout.   GI: Soft and non distended with normoactive bowel sounds present in all quadrants. No tenderness, rebound, guarding. No organomegaly.   EXTREMITIES: No peripheral edema. 2+ bilateral pedal pulses.   NEUROLOGIC: Alert and orientated x 3. CN II-XII grossly intact. No focal deficits.   MUSCULOSKELETAL: Tenderness to sternum and left breast.  SKIN: No jaundice. No rashes or lesions.     The following Labs were reviewed today:  CBC RESULTS:  Lab Results   Component Value Date    WBC 14.1 (H) 04/18/2023    WBC 9.8 11/20/2019    RBC 3.85 (L) 04/18/2023    RBC 4.70 11/20/2019    HGB 11.1 (L) 04/18/2023    HGB 14.3 11/20/2019    HCT 36.3 (L) 04/18/2023    HCT 45.3 11/20/2019    MCV 94 04/18/2023    MCV 96 11/20/2019    MCH 28.8 04/18/2023    MCH 30.4 11/20/2019    MCHC 30.6 (L) 04/18/2023    MCHC 31.6 11/20/2019    RDW 14.8 04/18/2023    RDW 13.0 11/20/2019      04/18/2023     11/20/2019       CMP RESULTS:  Lab Results   Component Value Date     04/18/2023     11/20/2019    POTASSIUM 4.0 04/18/2023    POTASSIUM 3.3 (L) 03/23/2023    POTASSIUM 4.5 11/20/2019    CHLORIDE 95 (L) 04/18/2023    CHLORIDE 105 11/20/2019    CO2 35 (H) 04/18/2023    CO2 28 11/20/2019    ANIONGAP 8 04/18/2023    ANIONGAP 5 11/20/2019    GLC 93 04/18/2023     (H) 04/09/2023     (H) 11/20/2019    BUN 17.1 04/18/2023    BUN 23 11/20/2019    CR 1.07 04/18/2023    CR 0.90 11/20/2019    GFRESTIMATED 81 04/18/2023    GFRESTIMATED >90 11/20/2019    GFRESTBLACK >90 11/20/2019    TONY 9.2 04/18/2023    TONY 8.7 11/20/2019    BILITOTAL 0.5 04/18/2023    BILITOTAL 0.3 11/20/2019    ALBUMIN 3.8 04/18/2023    ALBUMIN 3.6 11/20/2019    ALKPHOS 134 (H) 04/18/2023    ALKPHOS 68 11/20/2019    ALT 22 04/18/2023    ALT 22 11/20/2019    AST 30 04/18/2023    AST 16 11/20/2019        INR RESULTS:  Lab Results   Component Value Date    INR 3.67 (H) 04/18/2023    INR 1.01 11/19/2018       Lab Results   Component Value Date    MAG 1.9 04/08/2023    MAG 2.1 01/12/2017     Lab Results   Component Value Date    NTBNPI 31,087 (H) 03/20/2023    NTBNPI 11,183 (H) 01/09/2017     Lab Results   Component Value Date    NTBNP 7,719 (H) 04/18/2023    NTBNP 2,306 (H) 11/20/2019       The following diagnostics were reviewed today:  Echo 3/28/23:   Interpretation Summary  Technically difficult study.Extremely poor acoustic windows.  Limited information was obtained during study.  LVAD cannula was seen in the expected anatomic position in the LV apex.  HM3 at 5200RPM.  Septum normal.  LVIDd 56mm.  Aortic valve not well visualized. No AI.  Normal outflow velocity.  Global right ventricular function is moderately to severely reduced.  Small pericardial effusion with organizing material in pericardial cavity.    Assessment and Plan:   Mr. Fragoso is a 57 year old male with medical history pertinent for  HFrEF (10%) secondary to NICM (suspected viral etiology) s/p ICD, moderate TR s/p TV repair, pAF, chronic tobacco/marijuana use, COPD, HTN, CKD stage III who was admitted to [facility] on 3/15/23 following a VT/VF arrest s/p HM III LVAD 3/23/23. Postop course complicated by Afib wirh RVR, hypervolemia, and leukocytosis. He presents to clinic for routine follow up. He continues to struggle with uncontrolled pain and mild cough at euvolemic state.     Chronic systolic heart failure secondary to NICM s/p HM III LVAD. Moderate TR s/p tricuspid valve repair with Hassan 32 mm MC3 tricuspid annuloplasty ring.  Implanted 3/23/23 and complicated by Afib with RVR, hypervolemia, and leukocytosis.     Stage D, NYHA Class IIIB  ACEi/ARB Increase Lisinopril to 10 mg po daily, consider Entresto in the future.   BB Defer s/p LVAD   Aldosterone antagonist Aldactone 12.5 mg po daily  SGLT2I: Jardiance 10 mg po daily   SCD prophylaxis ICD  Fluid status euvolemic  MAP: 73  LDH: 339  Anticoagulation: Coumadin per pharmacy, INR-3.67, goal 2-3.   Antiplatelet: ASA 81 mg po daily   - provided 14 tabs of Oxycodone with recommendation for taper to 1 tab q8h to then wean off. Discussed desire for no further refills.   - Exam and case reviewed with Camille Garcia PA-C with [facility].     Leukocytosis. ID noted no indication for long term antibiotics inpatient given no acute findings on CT.   - Due to persistent leukocytosis, pain, and prior pericardial effusion will obtain CT Chest/Abd/Pelvis.   - CRP trending down and WBC stable. No acute findings aside from cough and pain.     Afib with RVR, resolved.   - Continue Amiodarone.    HTN.   - MAP stable. Adjust lisinopril as above.     CKD Stage III.   - Cr stable.      Follow up CT today and in Cardiology clinic 4/25/23 with Dr. Aguiar.     Jennifer Ramirez, APRN CNP  4/18/2023          RICARDO KRUGER        Please do not hesitate to contact me if you have any questions/concerns.     Sincerely,      VERONICA Franz CNP

## 2023-04-18 NOTE — NURSING NOTE
Chief Complaint   Patient presents with     Follow Up     Return VAD     Vitals were taken and medications reconciled.    Lex Babb, EMT  3:58 PM

## 2023-04-18 NOTE — PATIENT INSTRUCTIONS
Medications:  INCREASE your lisinopril to 10mg daily.  Let us know if you become dizzy with this increase.  Change positions slowly, until your body becomes acclimated to the new dose.    Instructions:  For pain: take Tylenol 1000 mg every 8 hours to help manage the discomfort.  Its also helpful to use ice and or heat at or around the site of discomfort.  We are giving you a few tabs of the oxycodone, please use it sparingly  You need a CT scan before you leave today.  Someone will contact you tonight or tomorrow regarding the results  As of now, you don't have an appointment next week, but we may need to add one, depending on the testing today, or if your pain is  not improving.    Follow-up:   Please schedule CT scan for today, ASAP  As previously arranged      Page the VAD Coordinator on call if you gain more than 3 lb in a day or 5 in a week. Please also page if you feel unwell or have alarms.   Great to see you in clinic today. To Page the VAD Coordinator on call, dial [phone] option #[phone] and ask to speak to the VAD coordinator on call.

## 2023-04-18 NOTE — PROGRESS NOTES
HPI:   Mr. Fragoso is a 57 year old male with medical history pertinent for HFrEF (10%) secondary to NICM (suspected viral etiology) s/p ICD, moderate TR s/p TV repair, pAF, chronic tobacco/marijuana use, COPD, HTN, CKD stage III who was admitted to [facility] on 3/15/23 following a VT/VF arrest. He underwent chest compressions and ROSC was obtained within 6 minutes. He then underwent HM III LVAD 3/23/23. His postoperative course was complicated by Afib with RVR prompting IV Amiodarone gtt with conversion to NSR, hypervolemia requiring diuresis, and leukocytosis concerning for HCAP prompting ID consultation with recommendation to watch off antibiotics. He presents to clinic for hospital follow up.     He continues to struggle with substernal and left breast pain. He notes persistent mild productive cough. He denies lightheadedness, dizziness, changes in speech, fever, chills, chest pain, JOHNSON, PND, nausea, vomiting, diarrhea, melena, hematochezia, and LE edema. He denies any concerns at his LVAD drive line site. His weight has trended down to 171 lbs He continues to maintain a low sodium diet. He underwent a dental cap repair today with pretreatment with Amoxicillin.     VAD Interrogation on : VAD interrogation reviewed with VAD coordinator. Agree with findings. No PI events, power spikes, speed drops, or other findings suspicious of pump malfunction noted.     PAST MEDICAL HISTORY:  Past Medical History:   Diagnosis Date     Acute right lumbar radiculopathy 11/02/2015     Acute systolic heart failure (H) 01/10/2017     Cardiomyopathy (H) 01/10/2017     CKD (chronic kidney disease) stage 3, GFR 30-59 ml/min (H) 01/30/2020     JOHNSON (dyspnea on exertion)      ED (erectile dysfunction) 10/02/2019     Erectile dysfunction      ICD (implantable cardioverter-defibrillator) in place- [employer] single chamber- NOT dependent 10/09/2017     Personal history of smoking 01/04/2017     Pulmonary nodules 01/17/2017    CT 11/2018:   Bilateral pulmonary nodules measuring up to 4 mm. No new or enlarging pulmonary nodules.       FAMILY HISTORY:  Family History   Problem Relation Age of Onset     Parkinsonism Mother      Atrial fibrillation Father      Heart Failure Father      Prostate Cancer Father      Skin Cancer Father      Anorexia nervosa Daughter      Other - See Comments Granddaughter         premature birth       SOCIAL HISTORY:  Social History     Socioeconomic History     Marital status:      Spouse name: Cheryl     Number of children: 2     Years of education: None     Highest education level: None   Occupational History     Occupation: Construction      Employer: SELF   Tobacco Use     Smoking status: Former     Packs/day: 0.25     Years: 15.00     Pack years: 3.75     Types: Cigarettes     Smokeless tobacco: Former     Quit date: 10/24/2011   Vaping Use     Vaping status: Never Used   Substance and Sexual Activity     Alcohol use: No     Alcohol/week: 0.0 standard drinks of alcohol     Drug use: No     Sexual activity: Yes     Partners: Female     Birth control/protection: Condom   Other Topics Concern     Parent/sibling w/ CABG, MI or angioplasty before 65F 55M? Yes     Comment: both parents had stints placed        CURRENT MEDICATIONS:  Outpatient Medications Prior to Visit   Medication Sig Dispense Refill     acetaminophen (TYLENOL) 325 MG tablet Take 2 tablets (650 mg) by mouth every 4 hours as needed for other (For optimal non-opioid multimodal pain management to improve pain control.) 100 tablet 0     amiodarone (PACERONE) 200 MG tablet Take 1 tablet (200 mg) by mouth daily 30 tablet 0     amoxicillin (AMOXIL) 500 MG tablet Take 4 tablets (2,000 mg) by mouth once as needed (Take one hour before dental procedure.) 4 tablet 0     aspirin (ASA) 81 MG chewable tablet 1 tablet (81 mg) by Oral or NG Tube route daily 30 tablet 0     bumetanide (BUMEX) 1 MG tablet Take 1 tablet (1 mg) by mouth daily 30 tablet 0     digoxin  (LANOXIN) 250 MCG tablet Take 1 tablet (250 mcg) by mouth daily 30 tablet 0     empagliflozin (JARDIANCE) 10 MG TABS tablet Take 1 tablet (10 mg) by mouth daily 90 tablet 1     gabapentin (NEURONTIN) 300 MG capsule 1 capsule (300 mg) by Oral or Feeding Tube route 3 times daily 90 capsule 0     lisinopril (ZESTRIL) 5 MG tablet Take 2 tablets (10 mg) by mouth daily 90 tablet 3     nystatin (MYCOSTATIN) 970155 UNIT/ML suspension Take 5 mLs (500,000 Units) by mouth 4 times daily 100 mL 0     pantoprazole (PROTONIX) 40 MG EC tablet Take 1 tablet (40 mg) by mouth every morning (before breakfast) 30 tablet 0     senna-docusate (SENOKOT-S/PERICOLACE) 8.6-50 MG tablet Take 2 tablets by mouth 2 times daily as needed for constipation 40 tablet 0     spironolactone (ALDACTONE) 25 MG tablet Take 0.5 tablets (12.5 mg) by mouth daily 30 tablet 0     warfarin ANTICOAGULANT (COUMADIN) 5 MG tablet Take 1 tablet (5 mg) by mouth at 6 pm on 4/9 and have INR check 4/10 and have dose adjusted 60 tablet 0     methocarbamol (ROBAXIN) 500 MG tablet Take 1 tablet (500 mg) by mouth 4 times daily (Patient not taking: Reported on 4/12/2023) 60 tablet 0     polyethylene glycol (MIRALAX) 17 GM/Dose powder Take 17 g by mouth daily (Patient not taking: Reported on 4/12/2023) 510 g 0     lisinopril (ZESTRIL) 5 MG tablet Take 1 tablet (5 mg) by mouth daily 90 tablet 3     oxyCODONE IR (ROXICODONE) 5 MG tablet Take 1-2 tablets (5-10 mg) by mouth every 6 hours as needed for severe pain 60 tablet 0     No facility-administered medications prior to visit.       ROS:   CONSTITUTIONAL: Denies fever, chills, fatigue, or weight fluctuations.   HEENT: Denies headache, vision changes, and changes in speech.   CV: Refer to HPI.   PULMONARY:Denies shortness of breath, cough, or previous TB exposure.   GI:Denies nausea, vomiting, diarrhea, and abdominal pain. Bowel movements are regular.   :Denies urinary alterations, dysuria, urinary frequency, hematuria, and  abnormal drainage.   EXT:Denies lower extremity edema.   SKIN:Denies abnormal rashes or lesions.   MUSCULOSKELETAL: Complains of substernal CP and left breast pain.   NEUROLOGIC:Denies lightheadedness, dizziness, seizures, or upper or lower extremity paresthesia.     EXAM:  BP (!) 73/0 (BP Location: Right arm, Patient Position: Chair, Cuff Size: Adult Regular)   Pulse 89   Wt 77.8 kg (171 lb 8 oz)   SpO2 95%   BMI 26.86 kg/m    GENERAL: Appears alert and oriented times three.   HEENT: Eye symmetrical and free of discharge bilaterally. Mucous membranes moist and without lesions.  NECK: Supple and without lymphadenopathy. JVD below clavicular line upright.   CV: RRR, S1S2 present with LVAD hum.  RESPIRATORY: Respirations regular, even, and unlabored. Lungs CTA throughout.   GI: Soft and non distended with normoactive bowel sounds present in all quadrants. No tenderness, rebound, guarding. No organomegaly.   EXTREMITIES: No peripheral edema. 2+ bilateral pedal pulses.   NEUROLOGIC: Alert and orientated x 3. CN II-XII grossly intact. No focal deficits.   MUSCULOSKELETAL: Tenderness to sternum and left breast.  SKIN: No jaundice. No rashes or lesions.     The following Labs were reviewed today:  CBC RESULTS:  Lab Results   Component Value Date    WBC 14.1 (H) 04/18/2023    WBC 9.8 11/20/2019    RBC 3.85 (L) 04/18/2023    RBC 4.70 11/20/2019    HGB 11.1 (L) 04/18/2023    HGB 14.3 11/20/2019    HCT 36.3 (L) 04/18/2023    HCT 45.3 11/20/2019    MCV 94 04/18/2023    MCV 96 11/20/2019    MCH 28.8 04/18/2023    MCH 30.4 11/20/2019    MCHC 30.6 (L) 04/18/2023    MCHC 31.6 11/20/2019    RDW 14.8 04/18/2023    RDW 13.0 11/20/2019     04/18/2023     11/20/2019       CMP RESULTS:  Lab Results   Component Value Date     04/18/2023     11/20/2019    POTASSIUM 4.0 04/18/2023    POTASSIUM 3.3 (L) 03/23/2023    POTASSIUM 4.5 11/20/2019    CHLORIDE 95 (L) 04/18/2023    CHLORIDE 105 11/20/2019    CO2 35 (H)  04/18/2023    CO2 28 11/20/2019    ANIONGAP 8 04/18/2023    ANIONGAP 5 11/20/2019    GLC 93 04/18/2023     (H) 04/09/2023     (H) 11/20/2019    BUN 17.1 04/18/2023    BUN 23 11/20/2019    CR 1.07 04/18/2023    CR 0.90 11/20/2019    GFRESTIMATED 81 04/18/2023    GFRESTIMATED >90 11/20/2019    GFRESTBLACK >90 11/20/2019    TONY 9.2 04/18/2023    TONY 8.7 11/20/2019    BILITOTAL 0.5 04/18/2023    BILITOTAL 0.3 11/20/2019    ALBUMIN 3.8 04/18/2023    ALBUMIN 3.6 11/20/2019    ALKPHOS 134 (H) 04/18/2023    ALKPHOS 68 11/20/2019    ALT 22 04/18/2023    ALT 22 11/20/2019    AST 30 04/18/2023    AST 16 11/20/2019        INR RESULTS:  Lab Results   Component Value Date    INR 3.67 (H) 04/18/2023    INR 1.01 11/19/2018       Lab Results   Component Value Date    MAG 1.9 04/08/2023    MAG 2.1 01/12/2017     Lab Results   Component Value Date    NTBNPI 31,087 (H) 03/20/2023    NTBNPI 11,183 (H) 01/09/2017     Lab Results   Component Value Date    NTBNP 7,719 (H) 04/18/2023    NTBNP 2,306 (H) 11/20/2019       The following diagnostics were reviewed today:  Echo 3/28/23:   Interpretation Summary  Technically difficult study.Extremely poor acoustic windows.  Limited information was obtained during study.  LVAD cannula was seen in the expected anatomic position in the LV apex.  HM3 at 5200RPM.  Septum normal.  LVIDd 56mm.  Aortic valve not well visualized. No AI.  Normal outflow velocity.  Global right ventricular function is moderately to severely reduced.  Small pericardial effusion with organizing material in pericardial cavity.    Assessment and Plan:   Mr. Fragoso is a 57 year old male with medical history pertinent for HFrEF (10%) secondary to NICM (suspected viral etiology) s/p ICD, moderate TR s/p TV repair, pAF, chronic tobacco/marijuana use, COPD, HTN, CKD stage III who was admitted to [facility] on 3/15/23 following a VT/VF arrest s/p HM III LVAD 3/23/23. Postop course complicated by Afib wirh RVR, hypervolemia, and  leukocytosis. He presents to clinic for routine follow up. He continues to struggle with uncontrolled pain and mild cough at euvolemic state.     Chronic systolic heart failure secondary to NICM s/p HM III LVAD. Moderate TR s/p tricuspid valve repair with Hassan 32 mm MC3 tricuspid annuloplasty ring.  Implanted 3/23/23 and complicated by Afib with RVR, hypervolemia, and leukocytosis.     Stage D, NYHA Class IIIB  ACEi/ARB Increase Lisinopril to 10 mg po daily, consider Entresto in the future.   BB Defer s/p LVAD   Aldosterone antagonist Aldactone 12.5 mg po daily  SGLT2I: Jardiance 10 mg po daily   SCD prophylaxis ICD  Fluid status euvolemic  MAP: 73  LDH: 339  Anticoagulation: Coumadin per pharmacy, INR-3.67, goal 2-3.   Antiplatelet: ASA 81 mg po daily   - provided 14 tabs of Oxycodone with recommendation for taper to 1 tab q8h to then wean off. Discussed desire for no further refills.   - Exam and case reviewed with Camille Garcia PA-C with [facility].     Leukocytosis. ID noted no indication for long term antibiotics inpatient given no acute findings on CT.   - Due to persistent leukocytosis, pain, and prior pericardial effusion will obtain CT Chest/Abd/Pelvis.   - CRP trending down and WBC stable. No acute findings aside from cough and pain.     Afib with RVR, resolved.   - Continue Amiodarone.    HTN.   - MAP stable. Adjust lisinopril as above.     CKD Stage III.   - Cr stable.      Follow up CT today and in Cardiology clinic 4/25/23 with Dr. Aguiar.     Jennifer Ramierz, APRN CNP  4/18/2023          RICARDO KRUGER

## 2023-04-18 NOTE — PROGRESS NOTES
Pt's wife paged the VAD coordinator on call this am to discuss pain med refill. Pt is out of the oxycodone that was prescribed at discharge and is still experiencing chest and rib pain from surgery. She stated that they have an appt to establish care with a PCP in early May, but don't have a provider currently to discuss this with.   Directed pt and wife to the [facility] on-call number listed in their discharge paperwork. They agreed to call [facility].

## 2023-04-21 ENCOUNTER — TELEPHONE (OUTPATIENT)
Dept: CARDIOLOGY | Facility: CLINIC | Age: 58
End: 2023-04-21
Payer: COMMERCIAL

## 2023-04-21 ENCOUNTER — CARE COORDINATION (OUTPATIENT)
Dept: CARDIOLOGY | Facility: CLINIC | Age: 58
End: 2023-04-21
Payer: COMMERCIAL

## 2023-04-21 DIAGNOSIS — Z79.899 LONG TERM USE OF DRUG: ICD-10-CM

## 2023-04-21 DIAGNOSIS — I50.22 CHRONIC SYSTOLIC CONGESTIVE HEART FAILURE (H): ICD-10-CM

## 2023-04-21 DIAGNOSIS — Z95.811 LEFT VENTRICULAR ASSIST DEVICE PRESENT (H): ICD-10-CM

## 2023-04-21 NOTE — TELEPHONE ENCOUNTER
MTM referral from: [facility] visit (referral by provider)    MTM referral outreach attempt #2 on April 21, 2023 at 1:26 PM      Outcome: Patient not reachable after several attempts, will route to MTM Pharmacist/Provider as an FYI.  [facility] scheduling number is [phone].  Thank you for the referral.    Jackie John - [facility]

## 2023-04-25 ENCOUNTER — ANTICOAGULATION THERAPY VISIT (OUTPATIENT)
Dept: ANTICOAGULATION | Facility: CLINIC | Age: 58
End: 2023-04-25

## 2023-04-25 ENCOUNTER — OFFICE VISIT (OUTPATIENT)
Dept: CARDIOLOGY | Facility: CLINIC | Age: 58
End: 2023-04-25
Attending: INTERNAL MEDICINE
Payer: COMMERCIAL

## 2023-04-25 ENCOUNTER — LAB (OUTPATIENT)
Dept: LAB | Facility: CLINIC | Age: 58
End: 2023-04-25
Payer: COMMERCIAL

## 2023-04-25 ENCOUNTER — MYC MEDICAL ADVICE (OUTPATIENT)
Dept: OTHER | Age: 58
End: 2023-04-25

## 2023-04-25 VITALS — BODY MASS INDEX: 27.44 KG/M2 | WEIGHT: 175.2 LBS | OXYGEN SATURATION: 95 % | SYSTOLIC BLOOD PRESSURE: 68 MMHG

## 2023-04-25 DIAGNOSIS — I10 BENIGN ESSENTIAL HYPERTENSION: ICD-10-CM

## 2023-04-25 DIAGNOSIS — I50.22 CHRONIC SYSTOLIC CONGESTIVE HEART FAILURE (H): ICD-10-CM

## 2023-04-25 DIAGNOSIS — Z95.811 LVAD (LEFT VENTRICULAR ASSIST DEVICE) PRESENT (H): ICD-10-CM

## 2023-04-25 DIAGNOSIS — I50.23 ACUTE ON CHRONIC SYSTOLIC CONGESTIVE HEART FAILURE (H): Primary | ICD-10-CM

## 2023-04-25 DIAGNOSIS — Z95.811 LEFT VENTRICULAR ASSIST DEVICE PRESENT (H): ICD-10-CM

## 2023-04-25 DIAGNOSIS — Z79.899 LONG TERM USE OF DRUG: ICD-10-CM

## 2023-04-25 DIAGNOSIS — Z79.01 ANTICOAGULATED ON COUMADIN: ICD-10-CM

## 2023-04-25 DIAGNOSIS — Z95.811 LEFT VENTRICULAR ASSIST DEVICE PRESENT (H): Primary | ICD-10-CM

## 2023-04-25 DIAGNOSIS — I42.8 NONISCHEMIC CARDIOMYOPATHY (H): ICD-10-CM

## 2023-04-25 DIAGNOSIS — I50.20 HEART FAILURE WITH REDUCED EJECTION FRACTION, NYHA CLASS III (H): ICD-10-CM

## 2023-04-25 DIAGNOSIS — E78.2 MIXED HYPERLIPIDEMIA: ICD-10-CM

## 2023-04-25 LAB
ALBUMIN SERPL BCG-MCNC: 4.1 G/DL (ref 3.5–5.2)
ALP SERPL-CCNC: 145 U/L (ref 40–129)
ALT SERPL W P-5'-P-CCNC: 25 U/L (ref 10–50)
ANION GAP SERPL CALCULATED.3IONS-SCNC: 9 MMOL/L (ref 7–15)
AST SERPL W P-5'-P-CCNC: 37 U/L (ref 10–50)
BASOPHILS # BLD AUTO: 0.2 10E3/UL (ref 0–0.2)
BASOPHILS NFR BLD AUTO: 1 %
BILIRUB SERPL-MCNC: 0.6 MG/DL
BUN SERPL-MCNC: 32.2 MG/DL (ref 6–20)
CALCIUM SERPL-MCNC: 9.9 MG/DL (ref 8.6–10)
CHLORIDE SERPL-SCNC: 95 MMOL/L (ref 98–107)
CREAT SERPL-MCNC: 1.39 MG/DL (ref 0.67–1.17)
DEPRECATED HCO3 PLAS-SCNC: 32 MMOL/L (ref 22–29)
EOSINOPHIL # BLD AUTO: 1.9 10E3/UL (ref 0–0.7)
EOSINOPHIL NFR BLD AUTO: 11 %
ERYTHROCYTE [DISTWIDTH] IN BLOOD BY AUTOMATED COUNT: 15.1 % (ref 10–15)
GFR SERPL CREATININE-BSD FRML MDRD: 59 ML/MIN/1.73M2
GLUCOSE SERPL-MCNC: 118 MG/DL (ref 70–99)
HCT VFR BLD AUTO: 40.6 % (ref 40–53)
HGB BLD-MCNC: 12.8 G/DL (ref 13.3–17.7)
IMM GRANULOCYTES # BLD: 0.4 10E3/UL
IMM GRANULOCYTES NFR BLD: 2 %
INR PPP: 2.88 (ref 0.85–1.15)
LDH SERPL L TO P-CCNC: 358 U/L (ref 0–250)
LYMPHOCYTES # BLD AUTO: 3.5 10E3/UL (ref 0.8–5.3)
LYMPHOCYTES NFR BLD AUTO: 21 %
MCH RBC QN AUTO: 29.2 PG (ref 26.5–33)
MCHC RBC AUTO-ENTMCNC: 31.5 G/DL (ref 31.5–36.5)
MCV RBC AUTO: 93 FL (ref 78–100)
MONOCYTES # BLD AUTO: 1.5 10E3/UL (ref 0–1.3)
MONOCYTES NFR BLD AUTO: 9 %
NEUTROPHILS # BLD AUTO: 9.5 10E3/UL (ref 1.6–8.3)
NEUTROPHILS NFR BLD AUTO: 56 %
NRBC # BLD AUTO: 0 10E3/UL
NRBC BLD AUTO-RTO: 0 /100
PLATELET # BLD AUTO: 367 10E3/UL (ref 150–450)
POTASSIUM SERPL-SCNC: 4.5 MMOL/L (ref 3.4–5.3)
PROT SERPL-MCNC: 7.4 G/DL (ref 6.4–8.3)
RBC # BLD AUTO: 4.38 10E6/UL (ref 4.4–5.9)
SODIUM SERPL-SCNC: 136 MMOL/L (ref 136–145)
WBC # BLD AUTO: 16.9 10E3/UL (ref 4–11)

## 2023-04-25 PROCEDURE — 36415 COLL VENOUS BLD VENIPUNCTURE: CPT | Performed by: PATHOLOGY

## 2023-04-25 PROCEDURE — 85025 COMPLETE CBC W/AUTO DIFF WBC: CPT | Performed by: PATHOLOGY

## 2023-04-25 PROCEDURE — 80053 COMPREHEN METABOLIC PANEL: CPT | Performed by: PATHOLOGY

## 2023-04-25 PROCEDURE — 83615 LACTATE (LD) (LDH) ENZYME: CPT | Performed by: PATHOLOGY

## 2023-04-25 PROCEDURE — 85610 PROTHROMBIN TIME: CPT | Performed by: PATHOLOGY

## 2023-04-25 PROCEDURE — 93750 INTERROGATION VAD IN PERSON: CPT | Performed by: INTERNAL MEDICINE

## 2023-04-25 PROCEDURE — G0463 HOSPITAL OUTPT CLINIC VISIT: HCPCS | Mod: 25 | Performed by: INTERNAL MEDICINE

## 2023-04-25 PROCEDURE — 99215 OFFICE O/P EST HI 40 MIN: CPT | Mod: 24 | Performed by: INTERNAL MEDICINE

## 2023-04-25 RX ORDER — ASPIRIN 81 MG/1
81 TABLET, CHEWABLE ORAL DAILY
Qty: 90 TABLET | Refills: 3 | Status: SHIPPED | OUTPATIENT
Start: 2023-04-25 | End: 2024-01-22

## 2023-04-25 RX ORDER — DIGOXIN 250 MCG
250 TABLET ORAL DAILY
Qty: 30 TABLET | Refills: 11 | Status: SHIPPED | OUTPATIENT
Start: 2023-04-25 | End: 2023-07-05

## 2023-04-25 RX ORDER — AMIODARONE HYDROCHLORIDE 200 MG/1
200 TABLET ORAL DAILY
Qty: 30 TABLET | Refills: 11 | Status: SHIPPED | OUTPATIENT
Start: 2023-04-25 | End: 2023-07-05

## 2023-04-25 RX ORDER — BUMETANIDE 1 MG/1
TABLET ORAL
Qty: 30 TABLET | Refills: 0 | Status: SHIPPED | OUTPATIENT
Start: 2023-04-25 | End: 2023-06-07

## 2023-04-25 RX ORDER — SPIRONOLACTONE 25 MG/1
12.5 TABLET ORAL DAILY
Qty: 90 TABLET | Refills: 3 | Status: SHIPPED | OUTPATIENT
Start: 2023-04-25 | End: 2023-07-25 | Stop reason: SINTOL

## 2023-04-25 RX ORDER — LISINOPRIL 10 MG/1
10 TABLET ORAL DAILY
Qty: 90 TABLET | Refills: 3 | Status: SHIPPED | OUTPATIENT
Start: 2023-04-25 | End: 2023-05-09 | Stop reason: DRUGHIGH

## 2023-04-25 ASSESSMENT — PAIN SCALES - GENERAL: PAINLEVEL: NO PAIN (0)

## 2023-04-25 NOTE — PROGRESS NOTES
Addendum 4/25/23 Patient sent a [employer] message letting writer know that he has labs scheduled for 5/3 with his other labs. Updated the calendar with new date of INR being checked.    Willa Broderick RN

## 2023-04-25 NOTE — PATIENT INSTRUCTIONS
Medications:  DECREASE Bumex to 1mg Monday, Wednesday, Friday     Instructions:  1.    Follow-up: (make these appointments before you leave)  1. Dr. Waller - cardiac surgeon on 4/27/23   2. Next clinic visit will start at the hospital for your echo + speed optimization.     Page the VAD Coordinator on call if you gain more than 3 lb in a day or 5 in a week. Please also page if you feel unwell or have alarms.   Great to see you in clinic today. To Page the VAD Coordinator on call, dial [phone] option #4 and ask to speak to the VAD coordinator on call.

## 2023-04-25 NOTE — NURSING NOTE
Chief Complaint   Patient presents with     Follow Up     Post VAD implant with labs prior     Vitals were taken and medications reconciled.    Lex Babb, EMT  4:15 PM

## 2023-04-25 NOTE — PROGRESS NOTES
Chief complaint: Post LVAD implant 3/23/23 follow up    HPI:   Akshat Fragoso is a 58 year old male with history of HFrEF 2/2 NICM (diagnosed in 1/2017) s/p ICD placement in 6/2017, VT/VF arrest on 3/15/2023 requiring CPR for 6 mins with cardiogenic shock s/p HM3 LVAD implantation as DT 3/23/2023 c/b postop AF (on amiodarone and digoxin) and HAP, moderate TR s/p TV annuloplasty with 32 mm MC3 partial ring 3/23/2023, PFO closure 3/23/2023, chronic tobacco/marijuana use, COPD, HTN, CKD stage III, who presents today for post LVAD implant follow up,     He was admitted on 3/15/2023 for cardiac arrest in the setting of VT that was slower than his programmed VT threshold for intervention. Patient had been undergoing workup at [place] for LVAD as destination therapy (initially evaluated for transplant though patient having trouble quitting marijuana/tobacco use). During hospitalization, he extensive pre-operative work-up and on multi-disciplinary discussion, was determined to be an appropriate candidate for placement of the HM3 LVAD. He underwent HM3 LVAD implantation on 3/23/2023 with postop course c/b AF with RVR requiring amiodarone gtt, volume overload, and HAP treated with IV vancomycin and zosyn for 5 days. He was discharged to home on 4/9/2023.     Last clinic visit on 4/12/2023. He complained of mild productive cough, substernal and left breast pain. Lisinopril was increased from 5 mg to 10 mg daily. CT chest/abdomen/pelvis was also obtained for persistent leukocytosis and showed stable findings. Currently, he is feeling well, but still has some rib and sternal pain that requires oxycodone. Can walk many blocks now and do 1-2 flights of stairs without stopping. Denies fever, worsening shortness of breath, dyspnea on exertion, cough, PND, orthopnea, LE edema, palpitations, syncope or near-syncope, hemoptysis, nausea, vomiting, abdominal pain, melena, hematochezia, constipation, diarrhea, dysuria, hematuria, headaches,  local weakness, numbness/tingling of hands/feet.    LVAD interrogation: VAD interrogation reviewed with VAD coordinator. Agree with findings. Occasional PI events (lowest 1.8). No power spikes, speed drops, or other findings suspicious of pump malfunction noted.     Current cardiac medications  - Lisinopril 10 mg daily  - Spironolactone 12.5 mg daily  - Empagliflozin 10 mg daily  - Bumex 1 mg daily  - Digoxin 250 mcg daily  - Amiodarone 200 mg daily  - ASA 81 mg daily  - Warfarin (INR goal 2-3)       PAST MEDICAL HISTORY:  Past Medical History:   Diagnosis Date     Acute right lumbar radiculopathy 11/02/2015     Acute systolic heart failure (H) 01/10/2017     Cardiomyopathy (H) 01/10/2017     CKD (chronic kidney disease) stage 3, GFR 30-59 ml/min (H) 01/30/2020     JOHNSON (dyspnea on exertion)      ED (erectile dysfunction) 10/02/2019     Erectile dysfunction      ICD (implantable cardioverter-defibrillator) in place- [employer], single chamber- NOT dependent 10/09/2017     Personal history of smoking 01/04/2017     Pulmonary nodules 01/17/2017    CT 11/2018:  Bilateral pulmonary nodules measuring up to 4 mm. No new or enlarging pulmonary nodules.       CURRENT MEDICATIONS:  Current Outpatient Medications   Medication Sig Dispense Refill     acetaminophen (TYLENOL) 325 MG tablet Take 2 tablets (650 mg) by mouth every 4 hours as needed for other (For optimal non-opioid multimodal pain management to improve pain control.) 100 tablet 0     amiodarone (PACERONE) 200 MG tablet Take 1 tablet (200 mg) by mouth daily 30 tablet 0     amoxicillin (AMOXIL) 500 MG tablet Take 4 tablets (2,000 mg) by mouth once as needed (Take one hour before dental procedure.) 4 tablet 0     aspirin (ASA) 81 MG chewable tablet 1 tablet (81 mg) by Oral or NG Tube route daily 30 tablet 0     bumetanide (BUMEX) 1 MG tablet Take 1 tablet (1 mg) by mouth daily 30 tablet 0     digoxin (LANOXIN) 250 MCG tablet Take 1 tablet (250 mcg) by mouth  daily 30 tablet 0     empagliflozin (JARDIANCE) 10 MG TABS tablet Take 1 tablet (10 mg) by mouth daily 90 tablet 1     gabapentin (NEURONTIN) 300 MG capsule 1 capsule (300 mg) by Oral or Feeding Tube route 3 times daily 90 capsule 0     lisinopril (ZESTRIL) 5 MG tablet Take 2 tablets (10 mg) by mouth daily 90 tablet 3     methocarbamol (ROBAXIN) 500 MG tablet Take 1 tablet (500 mg) by mouth 4 times daily (Patient not taking: Reported on 4/12/2023) 60 tablet 0     nystatin (MYCOSTATIN) 074784 UNIT/ML suspension Take 5 mLs (500,000 Units) by mouth 4 times daily 100 mL 0     oxyCODONE (ROXICODONE) 5 MG tablet Take 1 tablet (5 mg) by mouth every 8 hours as needed for severe pain 14 tablet 0     pantoprazole (PROTONIX) 40 MG EC tablet Take 1 tablet (40 mg) by mouth every morning (before breakfast) 30 tablet 0     polyethylene glycol (MIRALAX) 17 GM/Dose powder Take 17 g by mouth daily (Patient not taking: Reported on 4/12/2023) 510 g 0     senna-docusate (SENOKOT-S/PERICOLACE) 8.6-50 MG tablet Take 2 tablets by mouth 2 times daily as needed for constipation 40 tablet 0     spironolactone (ALDACTONE) 25 MG tablet Take 0.5 tablets (12.5 mg) by mouth daily 30 tablet 0     warfarin ANTICOAGULANT (COUMADIN) 5 MG tablet Take 1 tablet (5 mg) by mouth at 6 pm on 4/9 and have INR check 4/10 and have dose adjusted 60 tablet 0       ALLERGIES:   No Known Allergies    FAMILY HISTORY:  Family History   Problem Relation Age of Onset     Parkinsonism Mother      Atrial fibrillation Father      Heart Failure Father      Prostate Cancer Father      Skin Cancer Father      Anorexia nervosa Daughter      Other - See Comments Granddaughter         premature birth       SOCIAL HISTORY:  Social History     Tobacco Use     Smoking status: Former     Packs/day: 0.25     Years: 15.00     Pack years: 3.75     Types: Cigarettes     Smokeless tobacco: Former     Quit date: 10/24/2011   Vaping Use     Vaping status: Never Used   Substance Use Topics      Alcohol use: No     Alcohol/week: 0.0 standard drinks of alcohol     Drug use: No       ROS:   A comprehensive 14 point review of systems is negative other than as mentioned in HPI.    Exam:  BP (!) 68/0 (BP Location: Right arm, Patient Position: Chair, Cuff Size: Adult Regular)   Wt 79.5 kg (175 lb 3.2 oz)   SpO2 95%   BMI 27.44 kg/m    General Appearance: AOx3, no clubbing/cyanosis, no edema, no JVD  HEENT: PERRL, EOMI, no pharyngeal erythema  Respiratory: CTAB, no wheezing, no crackles  Cardiovascular: LVAD hum  GI: no distension, normoactive bowel sounds, soft, not tender, no rebound tenderness or guarding, no hepatosplenomegaly  Genitourinary: no CVA tenderness  Skin: no rash  Musculoskeletal: no deformities, no joint swelling, no pitting edema bilaterally   Neurologic: CN grossly intact, no focal neurological deficits, no asterixis     Labs:  Reviewed.    Testing/Procedures:  ICD check 4/12/2023  Device: [employer] Mobile D150 DYNAGEN  Normal device function  Mode: VVI 40 bpm  : 0%  Intrinsic rhythm: VS @ 91 bpm  R waves measured 2 mV today which is not a new finding since LVAD implant on 3/23/23. RV threshold measured 1.5 V @ 0.4 ms  Estimated battery longevity to RRT = 10.5 years  Anticoagulant: warfarin  Ventricular Arrhythmia: 1 episode recorded in the VT monitor zon greg 3/28/23 @ 1303 - 14 sec, 175 bpm  Setting Changes: None     TTE 3/28/2023  Interpretation Summary  Technically difficult study.Extremely poor acoustic windows.  Limited information was obtained during study.  LVAD cannula was seen in the expected anatomic position in the LV apex.  HM3 at 5200RPM.  Septum normal.  LVIDd 56mm.  Aortic valve not well visualized. No AI.  Normal outflow velocity.  Global right ventricular function is moderately to severely reduced.  Small pericardial effusion with organizing material in pericardial cavity.    Assessment and Plan:   Akshat Fragoso is a 58 year old male with history of HFrEF 2/2 NICM  (diagnosed in 1/2017) s/p ICD placement in 6/2017, VT/VF arrest on 3/15/2023 requiring CPR for 6 mins with cardiogenic shock s/p HM3 LVAD implantation as DT 3/23/2023 c/b postop AF (on amiodarone and digoxin) and HAP, moderate TR s/p TV annuloplasty with 32 mm MC3 partial ring 3/23/2023, PFO closure 3/23/2023, chronic tobacco/marijuana use, COPD, HTN, CKD stage III, who presents today for post LVAD implant follow up.     Chronic systolic heart failure secondary to NICM with cardiogenic shock s/p HM III LVAD. ACC/AHA Stage D, NYHA Class IIIB  Moderate TR s/p TV annuloplasty with 32 mm MC3 partial ring 3/23/2023.  Has a history of NICM diagnosed in 1/2017 and underwent ICD for primary SCD prophylaxis in 4/2017. Patient had been undergoing workup at [place] for LVAD as destination therapy (initially evaluated for transplant though patient having trouble quitting marijuana/tobacco use). He had cardiac arrest in the setting of VT that was slower than his programmed VT threshold for intervention on 3/15/2023. Received CPR for 6 mins with ROSC and developed cardiogenic shock. During hospitalization, he underwent HM3 LVAD implantation on 3/23/2023 with postop course c/b AF with RVR requiring amiodarone gtt, volume overload, and HAP treated with IV vancomycin and zosyn for 5 days. He was discharged to home on 4/9/2023.    Stage D, NYHA Class IIIB  ACEi/ARB Lisinopril to 10 mg po daily, consider Entresto in the future.   BB Defer s/p LVAD   MRA: Spironolactone 12.5 mg po daily  SGLT2I: Empagliflozin 10 mg po daily   SCD prophylaxis ICD   Fluid status: euvolemic to mildly hypovolemic  MAP: 68 mmHg  LDH: 358  Anticoagulation: Warfarin per pharmacy, INR-2.88, goal 2-3.   Antiplatelet: ASA 81 mg po daily     Had occasional PI events with lowest PI of 1.8. Labs showing FRANCINE with elevated BUN, and Cr. Appears to be euvolemic to mildly hypovolemic today. Will have to back off on diuretics.  - Decreased bumex from 1 mg daily to 1 mg  every other day  - Repeat BMP in 1 week     Persistent lukocytosis.   ID noted no indication for long term antibiotics inpatient given no acute findings on CT. Repeat CT Chest/Abd/Pelvis on 4/18/2023 for persistent leukocytosis with stable findings. CRP trended down. WBC increased today to 17 from 14 earlier. No acute findings aside from pain.     Afib with RVR, resolved.   - Continue Amiodarone and Digoxin.  - Warfarin with INR goal 2-3     HTN.   - MAP stable.        Patient was seen and staffed with MD Laila Montgomery MD  Internal Medicine Resident (PGY-2)  [facility]    I have seen and evaluated the patient and agree with the assessment and plan as above.  LVAD was interrogated. PI was doen to 1.8 today but variable, PI events noted. Speed remains at 5200RPM. Power 4.3. No alarms

## 2023-04-25 NOTE — LETTER
4/7/2023    Faxed to:  ANGEL  Fax Number:  [phone]                                                                                                                                                                                             [email or web address]   Email Order Form to [email or web address]  Phone: [phone] Fax: [phone]      Patient Name: Akshat Fragoso Date: 4/25/2023   Facility Name: [facility]  Fax Number: [phone]    VAD Coordinator/ :   Miroslava Lobato RN Phone: [phone]    Email Address:  [email or web address]   Patient's Primary Insurance Upon Receiving the VAD unit: [employer]         Dressing Change Frequency: Daily X Every Other Day   Other (Specify)              Duration of Need:  DT (Lifetime) X BTT (until transplant)   Other (Specify)      NEW ORDERS ONLY: PLEASE SUBMIT PATIENT DEMOGRAPHICS, POST OP NOTES & NOTES FROM MOST RECENT CLINICAL VISIT WITH ORDER FORM.                  Fax: [phone]  Or Email Order Form to   [email or web address]     ck Product Size/ Description Quantity    Kit Options:     X Medline Driveline Management Tray [id number]    Daily Up to 30   X Medline Driveline Management Tray [id number]  Sensitive Up to 30   X Medline Driveline Management Tray [id number]  Weekly Up to 5    Centurion [place] Felton  [id number] Up to 30   X Centurion Adkins Felton [id number] Up to 30   X Aquaguard/Shower Shield 7 x 7       9  x 9      10  x 12  Up to 30   X Blenderm Surgical Tape 2 inch     Sensitive Skin:     X Uni-Solve Adhesive Remover Wipes  1 box    FreeDerm Adhesive Remover 1 oz. Spray Bottle Up to 8    Betadine Swabs  3/pack Up to 30    Hibiclens 16 oz. Bottle Up to 3    MicroKlenz Wound Cleanser Bottle Up to 3   X Medipore H Tape 2  Roll Up to 5    Micropore Paper Tape 2  Roll Up to 5   X CE 3M Blue Silicone Tape  Up to 5    MC Todd Secure  Felton  Up to 8   X CathGrip Felton  Med/2 Strap    Large/2 Strap Up to 10    Abdominal Binder Sm     Med     Lg      Up to 2    Simpurity Transparent Film 4 x 5 Up to 30    Individual Supplies:      Earloop Surgical Mask 100/box Up to 3    Alcohol Wipe  1 Box   X Non Sterile Gloves  100/box        Size:     L   Up to 3    Chlorascrub Swabsticks   Each     1  Up to 30   X Sterile Vinyl PF gloves Size   8    Up to 30    Sterile Gauze Sponge 4  x 4   (2/pkg) Up to 30    Sterile Drain Sponge 4  x 4   (2/pkg) Up to 30    Biopatch 1  Up to 30    3M No-sting Barrier Wipes 50/box Up to 3    Sorbaview Shield  Up to 30    Silverlon 1.5cm Square disc [id number] Up to 30     My signature below certifies the medical necessity of the above approved products and I certify the patient has been trained in the use of all products. The patient is aware that [facility] will contact him/her regarding the shipment of ordered dressing supplies.     Provider Name: Shaun Aguiar NPI#: [id number]   Provider Signature:  Provider Number: [id number]     [facility] will confirm receipt for all initial orders by sending a fax to the provider listed above.

## 2023-04-25 NOTE — NURSING NOTE
MCS VAD Pump Info     Row Name 04/25/23 1634 04/25/23 1631          MCS VAD Information    Implant -- LVAD     LVAD Pump -- HeartMate 3        Heartmate 3 LEFT VS    Flow (Lpm) 5.1 Lpm 5.4 Lpm     Pulse Index (PI) 1.6 PI 2 PI     Speed (rpm) 5200 rpm 5200 rpm     Power (mari) 3.7 mari 3.8 mari     Current Hct setting 40 36     Retired: Unexpected Alarms -- --        Primary Controller    Serial number -- [facility] [place] [id number]     Low flow alarm setting -- 2.5     High watt alarm setting -- --     EBB: Patient use -- 11     Replace in -- 29 Months        Backup Controller    Serial number -- [facility] [place] [id number]     EBB: Patient use -- 4     Replace EBB in -- 30 Months     Speed & HCT match primary controller -- --        VAD Interrogation    Alarms reported by patient -- N     Unexpected alarms noted upon interrogation -- None     PI events -- Occasional;w/ associated speed drops  pi range 1.8-6.9     Damage to equipment is noted -- N     Action taken -- Reviewed proper equipment care and maintenance        Driveline Exit Site    Dressing change done -- Y     Driveline properly secured -- Yes     DLES assessment -- c/d/i  scab at site, healing     Dressing used -- Daily kit     Frequency patient changes dressing -- Daily     Dressing modifications -- --     Dressing change supplier -- --                   Education Complete: Yes   Charge the BACKUP controller s backup battery every 6 months  Perform a self test on BACKUP every 6 months  Change the MPU s batteries every 6 months:Yes  Have equipment serviced yearly (if applicable):Yes  Reviewed cleaning battery & clip contacts weekly, patient has not done this yet.     Performed dressing change, no drainage at site, told patient depending on when suture removed potentially could transition to weekly dressings next week, will double check w/ primary coordinator. Scab at site, but no redness. [place] was slightly tight, loosened and re-educated on two fingers underneath. They have cath   at home but have not tried it yet. Ensured that it was added to [facility] order form as well as weeklies and aquaguards for showering in upcoming weeks.

## 2023-04-25 NOTE — PROGRESS NOTES
ANTICOAGULATION MANAGEMENT     Akshat Fragoso 58 year old male is on warfarin with therapeutic INR result. (Goal INR 2.0-3.0)    Recent labs: (last 7 days)     04/25/23  1233   INR 2.88*       ASSESSMENT       Source(s): Chart Review       Warfarin doses taken: Reviewed in chart    Diet: No new diet changes identified    Medication/supplement changes: None noted    New illness, injury, or hospitalization: No    Signs or symptoms of bleeding or clotting: No    Previous result: Supratherapeutic    Additional findings: None         PLAN     Recommended plan for no diet, medication or health factor changes affecting INR     Dosing Instructions: Continue your current warfarin dose with next INR in 1 week       Summary  As of 4/25/2023    Full warfarin instructions:  5 mg every day   Next INR check:  5/2/2023             Detailed voice message left for Akshat with dosing instructions and follow up date.   Sent Orteq message with dosing and follow up instructions    Contact [phone] to schedule and with any changes, questions or concerns.     Education provided:     Please call back if any changes to your diet, medications or how you've been taking warfarin    Contact [phone] with any changes, questions or concerns.     Plan made per [facility] anticoagulation protocol and per LVAD protocol    Willa Broderick RN  Anticoagulation Clinic  4/25/2023    _______________________________________________________________________     Anticoagulation Episode Summary     Current INR goal:  2.0-3.0   TTR:  12.3 % (1.1 wk)   Target end date:  Indefinite   Send INR reminders to:  ANTICOAG LVAD    Indications    Acute on chronic systolic congestive heart failure (H) [I50.23]  LVAD (left ventricular assist device) present (H) [Z95.811]  Chronic systolic congestive heart failure (H) [I50.22]  Long term use of drug [Z79.899]  Left ventricular assist device present (H) [Z95.811]           Comments:  Follow VAD Anticoag protocol:Yes: HeartMate  3   Bridging: Call MD each time   Date VAD placed: 3/23/23         Anticoagulation Care Providers     Provider Role Specialty Phone number    Kenzie Moreau MD Referring Advanced Heart Failure and Transplant Cardiology [phone]    Mile Bolivar, APRN CNP Referring Nurse Practitioner [phone]

## 2023-04-25 NOTE — LETTER
4/25/2023      RE: Akshat Fragoso  [address]  [facility] [address]       Dear Colleague,    Thank you for the opportunity to participate in the care of your patient, Akshat Fragoso, at the [facility] [place] [facility]. Please see a copy of my visit note below.    Chief complaint: Post LVAD implant 3/23/23 follow up    HPI:   Akshat Fragoso is a 58 year old male with history of HFrEF 2/2 NICM (diagnosed in 1/2017) s/p ICD placement in 6/2017, VT/VF arrest on 3/15/2023 requiring CPR for 6 mins with cardiogenic shock s/p HM3 LVAD implantation as DT 3/23/2023 c/b postop AF (on amiodarone and digoxin) and HAP, moderate TR s/p TV annuloplasty with 32 mm MC3 partial ring 3/23/2023, PFO closure 3/23/2023, chronic tobacco/marijuana use, COPD, HTN, CKD stage III, who presents today for post LVAD implant follow up,     He was admitted on 3/15/2023 for cardiac arrest in the setting of VT that was slower than his programmed VT threshold for intervention. Patient had been undergoing workup at [place] for LVAD as destination therapy (initially evaluated for transplant though patient having trouble quitting marijuana/tobacco use). During hospitalization, he extensive pre-operative work-up and on multi-disciplinary discussion, was determined to be an appropriate candidate for placement of the HM3 LVAD. He underwent HM3 LVAD implantation on 3/23/2023 with postop course c/b AF with RVR requiring amiodarone gtt, volume overload, and HAP treated with IV vancomycin and zosyn for 5 days. He was discharged to home on 4/9/2023.     Last clinic visit on 4/12/2023. He complained of mild productive cough, substernal and left breast pain. Lisinopril was increased from 5 mg to 10 mg daily. CT chest/abdomen/pelvis was also obtained for persistent leukocytosis and showed stable findings. Currently, he is feeling well, but still has some rib and sternal pain that requires  oxycodone. Can walk many blocks now and do 1-2 flights of stairs without stopping. Denies fever, worsening shortness of breath, dyspnea on exertion, cough, PND, orthopnea, LE edema, palpitations, syncope or near-syncope, hemoptysis, nausea, vomiting, abdominal pain, melena, hematochezia, constipation, diarrhea, dysuria, hematuria, headaches, local weakness, numbness/tingling of hands/feet.    LVAD interrogation: VAD interrogation reviewed with VAD coordinator. Agree with findings. Occasional PI events (lowest 1.8). No power spikes, speed drops, or other findings suspicious of pump malfunction noted.     Current cardiac medications  - Lisinopril 10 mg daily  - Spironolactone 12.5 mg daily  - Empagliflozin 10 mg daily  - Bumex 1 mg daily  - Digoxin 250 mcg daily  - Amiodarone 200 mg daily  - ASA 81 mg daily  - Warfarin (INR goal 2-3)       PAST MEDICAL HISTORY:  Past Medical History:   Diagnosis Date    Acute right lumbar radiculopathy 11/02/2015    Acute systolic heart failure (H) 01/10/2017    Cardiomyopathy (H) 01/10/2017    CKD (chronic kidney disease) stage 3, GFR 30-59 ml/min (H) 01/30/2020    JOHNSON (dyspnea on exertion)     ED (erectile dysfunction) 10/02/2019    Erectile dysfunction     ICD (implantable cardioverter-defibrillator) in place- Soma, single chamber- NOT dependent 10/09/2017    Personal history of smoking 01/04/2017    Pulmonary nodules 01/17/2017    CT 11/2018:  Bilateral pulmonary nodules measuring up to 4 mm. No new or enlarging pulmonary nodules.       CURRENT MEDICATIONS:  Current Outpatient Medications   Medication Sig Dispense Refill    acetaminophen (TYLENOL) 325 MG tablet Take 2 tablets (650 mg) by mouth every 4 hours as needed for other (For optimal non-opioid multimodal pain management to improve pain control.) 100 tablet 0    amiodarone (PACERONE) 200 MG tablet Take 1 tablet (200 mg) by mouth daily 30 tablet 0    amoxicillin (AMOXIL) 500 MG tablet Take 4 tablets (2,000 mg) by  mouth once as needed (Take one hour before dental procedure.) 4 tablet 0    aspirin (ASA) 81 MG chewable tablet 1 tablet (81 mg) by Oral or NG Tube route daily 30 tablet 0    bumetanide (BUMEX) 1 MG tablet Take 1 tablet (1 mg) by mouth daily 30 tablet 0    digoxin (LANOXIN) 250 MCG tablet Take 1 tablet (250 mcg) by mouth daily 30 tablet 0    empagliflozin (JARDIANCE) 10 MG TABS tablet Take 1 tablet (10 mg) by mouth daily 90 tablet 1    gabapentin (NEURONTIN) 300 MG capsule 1 capsule (300 mg) by Oral or Feeding Tube route 3 times daily 90 capsule 0    lisinopril (ZESTRIL) 5 MG tablet Take 2 tablets (10 mg) by mouth daily 90 tablet 3    methocarbamol (ROBAXIN) 500 MG tablet Take 1 tablet (500 mg) by mouth 4 times daily (Patient not taking: Reported on 4/12/2023) 60 tablet 0    nystatin (MYCOSTATIN) 627029 UNIT/ML suspension Take 5 mLs (500,000 Units) by mouth 4 times daily 100 mL 0    oxyCODONE (ROXICODONE) 5 MG tablet Take 1 tablet (5 mg) by mouth every 8 hours as needed for severe pain 14 tablet 0    pantoprazole (PROTONIX) 40 MG EC tablet Take 1 tablet (40 mg) by mouth every morning (before breakfast) 30 tablet 0    polyethylene glycol (MIRALAX) 17 GM/Dose powder Take 17 g by mouth daily (Patient not taking: Reported on 4/12/2023) 510 g 0    senna-docusate (SENOKOT-S/PERICOLACE) 8.6-50 MG tablet Take 2 tablets by mouth 2 times daily as needed for constipation 40 tablet 0    spironolactone (ALDACTONE) 25 MG tablet Take 0.5 tablets (12.5 mg) by mouth daily 30 tablet 0    warfarin ANTICOAGULANT (COUMADIN) 5 MG tablet Take 1 tablet (5 mg) by mouth at 6 pm on 4/9 and have INR check 4/10 and have dose adjusted 60 tablet 0       ALLERGIES:   No Known Allergies    FAMILY HISTORY:  Family History   Problem Relation Age of Onset    Parkinsonism Mother     Atrial fibrillation Father     Heart Failure Father     Prostate Cancer Father     Skin Cancer Father     Anorexia nervosa Daughter     Other - See Comments Granddaughter          premature birth       SOCIAL HISTORY:  Social History     Tobacco Use    Smoking status: Former     Packs/day: 0.25     Years: 15.00     Pack years: 3.75     Types: Cigarettes    Smokeless tobacco: Former     Quit date: 10/24/2011   Vaping Use    Vaping status: Never Used   Substance Use Topics    Alcohol use: No     Alcohol/week: 0.0 standard drinks of alcohol    Drug use: No       ROS:   A comprehensive 14 point review of systems is negative other than as mentioned in HPI.    Exam:  BP (!) 68/0 (BP Location: Right arm, Patient Position: Chair, Cuff Size: Adult Regular)   Wt 79.5 kg (175 lb 3.2 oz)   SpO2 95%   BMI 27.44 kg/m    General Appearance: AOx3, no clubbing/cyanosis, no edema, no JVD  HEENT: PERRL, EOMI, no pharyngeal erythema  Respiratory: CTAB, no wheezing, no crackles  Cardiovascular: LVAD hum  GI: no distension, normoactive bowel sounds, soft, not tender, no rebound tenderness or guarding, no hepatosplenomegaly  Genitourinary: no CVA tenderness  Skin: no rash  Musculoskeletal: no deformities, no joint swelling, no pitting edema bilaterally   Neurologic: CN grossly intact, no focal neurological deficits, no asterixis     Labs:  Reviewed.    Testing/Procedures:  ICD check 4/12/2023  Device: [employer] D150 DYNAGEN  Normal device function  Mode: VVI 40 bpm  : 0%  Intrinsic rhythm: VS @ 91 bpm  R waves measured 2 mV today which is not a new finding since LVAD implant on 3/23/23. RV threshold measured 1.5 V @ 0.4 ms  Estimated battery longevity to RRT = 10.5 years  Anticoagulant: warfarin  Ventricular Arrhythmia: 1 episode recorded in the VT monitor zon greg 3/28/23 @ 1303 - 14 sec, 175 bpm  Setting Changes: None     TTE 3/28/2023  Interpretation Summary  Technically difficult study.Extremely poor acoustic windows.  Limited information was obtained during study.  LVAD cannula was seen in the expected anatomic position in the LV apex.  HM3 at 5200RPM.  Septum normal.  LVIDd 56mm.  Aortic  valve not well visualized. No AI.  Normal outflow velocity.  Global right ventricular function is moderately to severely reduced.  Small pericardial effusion with organizing material in pericardial cavity.    Assessment and Plan:   Akshat Fragoso is a 58 year old male with history of HFrEF 2/2 NICM (diagnosed in 1/2017) s/p ICD placement in 6/2017, VT/VF arrest on 3/15/2023 requiring CPR for 6 mins with cardiogenic shock s/p HM3 LVAD implantation as DT 3/23/2023 c/b postop AF (on amiodarone and digoxin) and HAP, moderate TR s/p TV annuloplasty with 32 mm MC3 partial ring 3/23/2023, PFO closure 3/23/2023, chronic tobacco/marijuana use, COPD, HTN, CKD stage III, who presents today for post LVAD implant follow up.     Chronic systolic heart failure secondary to NICM with cardiogenic shock s/p HM III LVAD. ACC/AHA Stage D, NYHA Class IIIB  Moderate TR s/p TV annuloplasty with 32 mm MC3 partial ring 3/23/2023.  Has a history of NICM diagnosed in 1/2017 and underwent ICD for primary SCD prophylaxis in 4/2017. Patient had been undergoing workup at [place] for LVAD as destination therapy (initially evaluated for transplant though patient having trouble quitting marijuana/tobacco use). He had cardiac arrest in the setting of VT that was slower than his programmed VT threshold for intervention on 3/15/2023. Received CPR for 6 mins with ROSC and developed cardiogenic shock. During hospitalization, he underwent HM3 LVAD implantation on 3/23/2023 with postop course c/b AF with RVR requiring amiodarone gtt, volume overload, and HAP treated with IV vancomycin and zosyn for 5 days. He was discharged to home on 4/9/2023.    Stage D, NYHA Class IIIB  ACEi/ARB Lisinopril to 10 mg po daily, consider Entresto in the future.   BB Defer s/p LVAD   MRA: Spironolactone 12.5 mg po daily  SGLT2I: Empagliflozin 10 mg po daily   SCD prophylaxis ICD   Fluid status: euvolemic to mildly hypovolemic  MAP: 68 mmHg  LDH: 358  Anticoagulation: Warfarin  per pharmacy, INR-2.88, goal 2-3.   Antiplatelet: ASA 81 mg po daily     Had occasional PI events with lowest PI of 1.8. Labs showing FRANCINE with elevated BUN, and Cr. Appears to be euvolemic to mildly hypovolemic today. Will have to back off on diuretics.  - Decreased bumex from 1 mg daily to 1 mg every other day  - Repeat BMP in 1 week     Persistent lukocytosis.   ID noted no indication for long term antibiotics inpatient given no acute findings on CT. Repeat CT Chest/Abd/Pelvis on 4/18/2023 for persistent leukocytosis with stable findings. CRP trended down. WBC increased today to 17 from 14 earlier. No acute findings aside from pain.     Afib with RVR, resolved.   - Continue Amiodarone and Digoxin.  - Warfarin with INR goal 2-3     HTN.   - MAP stable.        Patient was seen and staffed with MD Laila Montgomery MD  Internal Medicine Resident (PGY-2)  [facility]    I have seen and evaluated the patient and agree with the assessment and plan as above.  LVAD was interrogated. PI was doen to 1.8 today but variable, PI events noted. Speed remains at 5200RPM. Power 4.3. No alarms      Please do not hesitate to contact me if you have any questions/concerns.     Sincerely,     Shaun Aguiar MD

## 2023-04-27 ENCOUNTER — CARE COORDINATION (OUTPATIENT)
Dept: CARDIOLOGY | Facility: CLINIC | Age: 58
End: 2023-04-27
Payer: COMMERCIAL

## 2023-04-27 ENCOUNTER — TELEPHONE (OUTPATIENT)
Dept: CARDIOLOGY | Facility: CLINIC | Age: 58
End: 2023-04-27
Payer: COMMERCIAL

## 2023-04-27 NOTE — TELEPHONE ENCOUNTER
Attempt to call 2x with call answered and hung up. Called 3 time and sent to [employer]. Left message that appointment was being cancelled with Dr. Waller due to emergency. Appointment needs rescheduled.

## 2023-04-27 NOTE — PROGRESS NOTES
D:  C/o pain to ribs.  Increases with deep breath.  Pt states he is taking tylenol intermittently.   Pt is requesting more narcotics for the weekend.  Pt states he is taking gabepentin TID.  Refuses to take robaxin had an episode of dizziness in the hospital.  I:  Instructed pt to take 1000mg tylenol q8hrs around the clock.  Recommended alternating ice and heat.  Consider using lidocaine patch.  If pain persists, he should go to urgent care or to the ED.  A:  Pt angry.  States he understands their is an opioide problem but some people just have pain and need something to help.  Does not want to go to urgent care or to the ED because he can't afford it and they will tell him to talk to his surgeon.   I:  Reiterated that he needs to be seen if requiring more medications.  A:  Pt scheduled to see PCP on 5/1 and Pain on 5/18.  P:  VAD Coordinator available for questions or concerns.

## 2023-05-01 ENCOUNTER — OFFICE VISIT (OUTPATIENT)
Dept: INTERNAL MEDICINE | Facility: CLINIC | Age: 58
End: 2023-05-01
Payer: COMMERCIAL

## 2023-05-01 ENCOUNTER — CARE COORDINATION (OUTPATIENT)
Dept: CARDIOLOGY | Facility: CLINIC | Age: 58
End: 2023-05-01

## 2023-05-01 ENCOUNTER — OFFICE VISIT (OUTPATIENT)
Dept: CARDIOLOGY | Facility: CLINIC | Age: 58
End: 2023-05-01
Attending: INTERNAL MEDICINE
Payer: COMMERCIAL

## 2023-05-01 VITALS
WEIGHT: 179 LBS | SYSTOLIC BLOOD PRESSURE: 60 MMHG | BODY MASS INDEX: 28.09 KG/M2 | HEART RATE: 65 BPM | OXYGEN SATURATION: 93 %

## 2023-05-01 VITALS
OXYGEN SATURATION: 94 % | BODY MASS INDEX: 27.76 KG/M2 | HEIGHT: 67 IN | SYSTOLIC BLOOD PRESSURE: 68 MMHG | HEART RATE: 61 BPM | WEIGHT: 176.9 LBS

## 2023-05-01 DIAGNOSIS — G89.29 OTHER CHRONIC PAIN: ICD-10-CM

## 2023-05-01 DIAGNOSIS — I50.22 CHRONIC SYSTOLIC CONGESTIVE HEART FAILURE (H): Primary | ICD-10-CM

## 2023-05-01 DIAGNOSIS — Z76.89 ENCOUNTER TO ESTABLISH CARE: Primary | ICD-10-CM

## 2023-05-01 DIAGNOSIS — J44.9 COPD, SEVERE (H): ICD-10-CM

## 2023-05-01 DIAGNOSIS — Z95.811 LVAD (LEFT VENTRICULAR ASSIST DEVICE) PRESENT (H): ICD-10-CM

## 2023-05-01 PROCEDURE — 93750 INTERROGATION VAD IN PERSON: CPT | Performed by: THORACIC SURGERY (CARDIOTHORACIC VASCULAR SURGERY)

## 2023-05-01 PROCEDURE — 99213 OFFICE O/P EST LOW 20 MIN: CPT | Mod: 24 | Performed by: THORACIC SURGERY (CARDIOTHORACIC VASCULAR SURGERY)

## 2023-05-01 PROCEDURE — G0463 HOSPITAL OUTPT CLINIC VISIT: HCPCS | Mod: 25 | Performed by: THORACIC SURGERY (CARDIOTHORACIC VASCULAR SURGERY)

## 2023-05-01 PROCEDURE — 99204 OFFICE O/P NEW MOD 45 MIN: CPT | Performed by: INTERNAL MEDICINE

## 2023-05-01 RX ORDER — OXYCODONE HYDROCHLORIDE 5 MG/1
5 TABLET ORAL EVERY 8 HOURS PRN
Qty: 10 TABLET | Refills: 0 | Status: SHIPPED | OUTPATIENT
Start: 2023-05-01 | End: 2023-05-11

## 2023-05-01 ASSESSMENT — ENCOUNTER SYMPTOMS
SNORES LOUDLY: 0
ORTHOPNEA: 1
NECK PAIN: 0
COUGH DISTURBING SLEEP: 0
SYNCOPE: 0
HYPERTENSION: 0
EXERCISE INTOLERANCE: 1
NECK PAIN: 0
STIFFNESS: 1
HEMOPTYSIS: 0
MYALGIAS: 1
MUSCLE WEAKNESS: 0
SNORES LOUDLY: 0
MYALGIAS: 1
COUGH DISTURBING SLEEP: 0
LIGHT-HEADEDNESS: 0
ARTHRALGIAS: 0
ORTHOPNEA: 1
DYSPNEA ON EXERTION: 1
PALPITATIONS: 1
DYSPNEA ON EXERTION: 1
SLEEP DISTURBANCES DUE TO BREATHING: 0
LEG PAIN: 1
EXERCISE INTOLERANCE: 1
HYPOTENSION: 1
MUSCLE WEAKNESS: 0
COUGH DISTURBING SLEEP: 0
SYNCOPE: 0
COUGH: 1
SYNCOPE: 0
BACK PAIN: 1
EXERCISE INTOLERANCE: 1
SPUTUM PRODUCTION: 1
SHORTNESS OF BREATH: 1
LIGHT-HEADEDNESS: 0
POSTURAL DYSPNEA: 1
LEG PAIN: 1
PALPITATIONS: 1
POSTURAL DYSPNEA: 1
COUGH: 1
HEMOPTYSIS: 0
SNORES LOUDLY: 0
MYALGIAS: 1
SPUTUM PRODUCTION: 1
JOINT SWELLING: 0
MUSCLE CRAMPS: 0
SLEEP DISTURBANCES DUE TO BREATHING: 0
MUSCLE WEAKNESS: 0
SPUTUM PRODUCTION: 1
HYPOTENSION: 1
HYPOTENSION: 1
PALPITATIONS: 1
MUSCLE CRAMPS: 0
BACK PAIN: 1
LEG PAIN: 1
JOINT SWELLING: 0
POSTURAL DYSPNEA: 1
COUGH: 1
STIFFNESS: 1
WHEEZING: 0
SHORTNESS OF BREATH: 1
STIFFNESS: 1
HYPERTENSION: 0
HYPERTENSION: 0
ARTHRALGIAS: 0
WHEEZING: 0
NECK PAIN: 0
SLEEP DISTURBANCES DUE TO BREATHING: 0
WHEEZING: 0
SHORTNESS OF BREATH: 1
BACK PAIN: 1
MUSCLE CRAMPS: 0
DYSPNEA ON EXERTION: 1
JOINT SWELLING: 0
HEMOPTYSIS: 0
LIGHT-HEADEDNESS: 0
ORTHOPNEA: 1
ARTHRALGIAS: 0

## 2023-05-01 ASSESSMENT — PAIN SCALES - GENERAL: PAINLEVEL: NO PAIN (0)

## 2023-05-01 NOTE — PROGRESS NOTES
"  Assessment & Plan     Encounter to establish care    COPD, severe (H)  PFTs 11/2018 revealed severe airflow obstruction. Reviewed with patient today. Patient unaware of diagnosis. It is reasonable to recheck lung function now to monitor progression and now that he has had heart surgery.   - General PFT Lab (Please always keep checked); Future  - Pulmonary Function Test; Future    LVAD (left ventricular assist device) present (H)  &  Other chronic pain  S/p LVAD surgery on 3/23/23 and has an appointment to follow-up with cardiovascular surgery this afternoon at 3p. Today Akshat c/o \"rib pain\" from chest compressions that is ongoing since prior to surgery. He has been taking tylenol 1000 mg q6-8h and does not think this helps his pain. He has trialed prn robaxin but does not like how it makes him feel. Patient was discharged on oxycodone 5 mg and has received one refill since then. Oxycodone to be refilled today and will collaborate with surgical team to coordinate ongoing care. Patient was referred to the pain clinic by his cardiology team and he is scheduled for 5/18.  Today he also asks about his COPD diagnosis. Reviewed \"severe airflow obstruction\" per PFTs done 11/2018.   - oxyCODONE (ROXICODONE) 5 MG tablet; Take 1 tablet (5 mg) by mouth every 8 hours as needed for severe pain               BMI:   Estimated body mass index is 27.77 kg/m  as calculated from the following:    Height as of this encounter: 1.7 m (5' 6.93\").    Weight as of this encounter: 80.2 kg (176 lb 14.4 oz).     No follow-ups on file.    Ernestine Falk, JUAN J Student  [facility]    *    Physician Attestation   I, Darian Estevez MD, was present with the medical/RABIA student who participated in the service and in the documentation of the note.  I have verified the history and personally performed the physical exam and medical decision making.  I agree with the assessment and plan of care as documented in the note.      Items " "personally reviewed: vitals.    MN  reviewed: Oxycodone refilled last on 4/18 for 14 tablets, 56 tablets on 4/9.  Akshat states that surgery referred him to primary care for ongoing refills, so rx for 10 tablets sent.  He states he will use this only on occasion for the worst flares of pain, hopefully will be able to taper off medication.    MD Darian Patterson MD  [facility] INTERNAL MEDICINE [place]    Subjective   Akshat is a 58 year old, presenting for the following health issues:  Establish Care (Rib pain from CPR)      HPI     Akshat presents to clinic today to establish care with a primary care doctor. He is s/p LVAD surgery on 3/23/23 and has an appointment to follow-up with cardiovascular surgery this afternoon at 3p. Today Akshat c/o \"rib pain\" from chest compressions that is ongoing since prior to surgery. He has been taking tylenol 1000 mg q6-8h and does not think this helps his pain. He has trialed prn robaxin but does not like how it makes him feel. Patient was discharged on oxycodone 5 mg and has received one refill since then. He is requesting additional refill for oxycodone today.    Today he also asks about his COPD diagnosis. Reviewed \"severe airflow obstruction\" per PFTs done 11/2018.         Objective    BP (!) 68/0 (BP Location: Right arm, Patient Position: Sitting, Cuff Size: Adult Regular)   Pulse 61   Ht 1.7 m (5' 6.93\")   Wt 80.2 kg (176 lb 14.4 oz)   SpO2 94%   BMI 27.77 kg/m    Body mass index is 27.77 kg/m .  Physical Exam   GENERAL: healthy, alert and no distress  PSYCH: mentation appears normal, affect normal/bright          Answers for HPI/ROS submitted by the patient on 5/1/2023  General Symptoms: No  Skin Symptoms: No  HENT Symptoms: No  EYE SYMPTOMS: No  HEART SYMPTOMS: Yes  LUNG SYMPTOMS: Yes  INTESTINAL SYMPTOMS: No  URINARY SYMPTOMS: No  REPRODUCTIVE SYMPTOMS: No  SKELETAL SYMPTOMS: Yes  BLOOD SYMPTOMS: No  NERVOUS SYSTEM SYMPTOMS: No  MENTAL " HEALTH SYMPTOMS: No  Pain in legs with walking: Yes  Trouble breathing while lying down: Yes  Fingers or toes appear blue: No  High blood pressure: No  Low blood pressure: Yes  Fainting: No  Murmurs: No  Pacemaker: Yes  Varicose veins: No  Edema or swelling: No  Wake up at night with shortness of breath: No  Exercise intolerance: Yes  Sputum or phlegm: Yes  Coughing up blood: No  Snoring: No  Difficulty breathing on exertion: Yes  Nighttime Cough: No  Difficulty breathing when lying flat: Yes  Bone pain: Yes  Muscle cramps: No  Muscle weakness: No  Joint stiffness: Yes  Bone fracture: Yes

## 2023-05-01 NOTE — NURSING NOTE
Chief Complaint   Patient presents with     Follow Up     Return CV surgery 5 week post VAD implant     Vitals were taken and medications reconciled.    Lex Babb, EMT  3:21 PM

## 2023-05-01 NOTE — LETTER
5/1/2023      RE: Akshat Fragoso  [address]  [facility] [address]       Dear Colleague,    Thank you for the opportunity to participate in the care of your patient, Akshat Fragoso, at the [facility] HEART CLINIC [place] at [facility]. Please see a copy of my visit note below.    CVTS Followup  Patient known to service after recent HM3 LVAD implant.  He's doing well since discharge. Energy is improving. Pain is well controlled. His diet is well tolerated. He is participating in cardiac rehab. He returns today for routine postop check.    BP (!) 60/0 (BP Location: Left arm, Patient Position: Chair, Cuff Size: Adult Regular)   Pulse 65   Wt 81.2 kg (179 lb)   SpO2 93%   BMI 28.09 kg/m      In chair, conversant, comfortable  CTAB[employer] c/d/i, Driveline c/d/i  Motor, sensation, pulses intact    A/P: s/p HM3 LVAD implant  - OK to discharge from surgical services  - Call or return with concerns  - Continue cardiac rehab    Dakota Waller  Cardiothoracic surgery  [phone]      Please do not hesitate to contact me if you have any questions/concerns.     Sincerely,     Dakota Waller MD

## 2023-05-01 NOTE — PATIENT INSTRUCTIONS
Medications:  Continue to follow with the pain clinic and your primary care     Instructions:  We will update the Wound Care Resources with a weekly dressing change order and make sure you have aquaguard.   Please bring your weekly kit and shower bag to your upcoming clinic visit with Dr. Aguiar so we can do education.  You are ok to start driving but do not drive while taking narcotics  You are ok to be alone, keep your backup bag with you and have a cell phone  Sex is okay to resume. As with any physical activity, pace yourself.   You are ok to hunt and fish but avoid boats and do not lift more than 30 lb.   Keep your VAD equipment warm enough    Follow-up: (make these appointments before you leave)  1. As scheduled with cardiology   2. Talk with Primary Care about Jardiance      Page the VAD Coordinator on call if you gain more than 3 lb in a day or 5 in a week. Please also page if you feel unwell or have alarms.   Great to see you in clinic today. To Page the VAD Coordinator on call, dial [phone] option #4 and ask to speak to the VAD coordinator on call.

## 2023-05-01 NOTE — NURSING NOTE
MCS VAD Pump Info     Row Name 05/01/23 1540             MCS VAD Information    Implant LVAD      LVAD Pump HeartMate 3         Heartmate 3 LEFT VS    Flow (Lpm) 5.1 Lpm      Pulse Index (PI) 1.9 PI      Speed (rpm) 5200 rpm      Power (mari) 3.7 mari      Current Hct setting 40      Retired: Unexpected Alarms --         Primary Controller    Serial number [id number]      Low flow alarm setting n/a      High watt alarm setting n/a      EBB: Patient use 11      Replace in 28 Months         Backup Controller    Serial number [id number]      EBB: Patient use 4      Replace EBB in 29 Months      Speed & HCT match primary controller --  n/a         VAD Interrogation    Alarms reported by patient N      Unexpected alarms noted upon interrogation None      PI events Frequent      Damage to equipment is noted N      Action taken Reviewed proper equipment care and maintenance         Driveline Exit Site    Dressing change done N      Driveline properly secured Yes      DLES assessment c/d/i  covered, no drainage per wife.      Dressing used Daily kit      Frequency patient changes dressing Daily      Dressing modifications --      Dressing change supplier --

## 2023-05-01 NOTE — NURSING NOTE
Akshat Fragoso is a 58 year old male patient that presents today in clinic for the following:    Chief Complaint   Patient presents with     Establish Care     Rib pain from CPR     The patient's allergies and medications were reviewed as noted. A set of vitals were recorded as noted without incident. The patient does not have any other questions for the provider.    Jono Lin, EMT at 12:25 PM on 5/1/2023

## 2023-05-02 DIAGNOSIS — I50.22 CHRONIC SYSTOLIC CONGESTIVE HEART FAILURE (H): ICD-10-CM

## 2023-05-02 DIAGNOSIS — Z95.811 LEFT VENTRICULAR ASSIST DEVICE PRESENT (H): ICD-10-CM

## 2023-05-02 DIAGNOSIS — Z79.899 LONG TERM USE OF DRUG: ICD-10-CM

## 2023-05-03 ENCOUNTER — LAB (OUTPATIENT)
Dept: LAB | Facility: CLINIC | Age: 58
End: 2023-05-03
Attending: INTERNAL MEDICINE
Payer: COMMERCIAL

## 2023-05-03 ENCOUNTER — OFFICE VISIT (OUTPATIENT)
Dept: CARDIOLOGY | Facility: CLINIC | Age: 58
End: 2023-05-03
Attending: INTERNAL MEDICINE
Payer: COMMERCIAL

## 2023-05-03 ENCOUNTER — HOSPITAL ENCOUNTER (OUTPATIENT)
Dept: CARDIOLOGY | Facility: CLINIC | Age: 58
Discharge: HOME | End: 2023-05-03
Attending: INTERNAL MEDICINE
Payer: COMMERCIAL

## 2023-05-03 ENCOUNTER — CARE COORDINATION (OUTPATIENT)
Dept: CARDIOLOGY | Facility: CLINIC | Age: 58
End: 2023-05-03

## 2023-05-03 ENCOUNTER — ANTICOAGULATION THERAPY VISIT (OUTPATIENT)
Dept: ANTICOAGULATION | Facility: CLINIC | Age: 58
End: 2023-05-03

## 2023-05-03 VITALS
OXYGEN SATURATION: 95 % | WEIGHT: 179.6 LBS | HEIGHT: 67 IN | HEART RATE: 60 BPM | BODY MASS INDEX: 28.19 KG/M2 | SYSTOLIC BLOOD PRESSURE: 60 MMHG

## 2023-05-03 DIAGNOSIS — Z79.899 LONG TERM USE OF DRUG: ICD-10-CM

## 2023-05-03 DIAGNOSIS — I50.23 ACUTE ON CHRONIC SYSTOLIC CONGESTIVE HEART FAILURE (H): Primary | ICD-10-CM

## 2023-05-03 DIAGNOSIS — Z95.811 LVAD (LEFT VENTRICULAR ASSIST DEVICE) PRESENT (H): ICD-10-CM

## 2023-05-03 DIAGNOSIS — I50.9 HEART FAILURE (H): ICD-10-CM

## 2023-05-03 DIAGNOSIS — Z95.811 LEFT VENTRICULAR ASSIST DEVICE PRESENT (H): ICD-10-CM

## 2023-05-03 DIAGNOSIS — I50.22 CHRONIC SYSTOLIC CONGESTIVE HEART FAILURE (H): Primary | ICD-10-CM

## 2023-05-03 DIAGNOSIS — I50.22 CHRONIC SYSTOLIC CONGESTIVE HEART FAILURE (H): ICD-10-CM

## 2023-05-03 DIAGNOSIS — I50.20 HEART FAILURE WITH REDUCED EJECTION FRACTION, NYHA CLASS III (H): ICD-10-CM

## 2023-05-03 DIAGNOSIS — Z79.01 ANTICOAGULATED ON COUMADIN: ICD-10-CM

## 2023-05-03 DIAGNOSIS — I50.22 CHRONIC SYSTOLIC HEART FAILURE (H): ICD-10-CM

## 2023-05-03 DIAGNOSIS — Z95.811 LEFT VENTRICULAR ASSIST DEVICE PRESENT (H): Primary | ICD-10-CM

## 2023-05-03 LAB
ALBUMIN SERPL BCG-MCNC: 4 G/DL (ref 3.5–5.2)
ALP SERPL-CCNC: 129 U/L (ref 40–129)
ALT SERPL W P-5'-P-CCNC: 26 U/L (ref 10–50)
ANION GAP SERPL CALCULATED.3IONS-SCNC: 11 MMOL/L (ref 7–15)
AST SERPL W P-5'-P-CCNC: 32 U/L (ref 10–50)
BASOPHILS # BLD AUTO: 0.2 10E3/UL (ref 0–0.2)
BASOPHILS NFR BLD AUTO: 1 %
BILIRUB SERPL-MCNC: 0.5 MG/DL
BUN SERPL-MCNC: 30.1 MG/DL (ref 6–20)
CALCIUM SERPL-MCNC: 9.3 MG/DL (ref 8.6–10)
CHLORIDE SERPL-SCNC: 95 MMOL/L (ref 98–107)
CREAT SERPL-MCNC: 1.54 MG/DL (ref 0.67–1.17)
DEPRECATED HCO3 PLAS-SCNC: 28 MMOL/L (ref 22–29)
EOSINOPHIL # BLD AUTO: 1.4 10E3/UL (ref 0–0.7)
EOSINOPHIL NFR BLD AUTO: 10 %
ERYTHROCYTE [DISTWIDTH] IN BLOOD BY AUTOMATED COUNT: 15.9 % (ref 10–15)
GFR SERPL CREATININE-BSD FRML MDRD: 53 ML/MIN/1.73M2
GLUCOSE SERPL-MCNC: 106 MG/DL (ref 70–99)
HCT VFR BLD AUTO: 39.5 % (ref 40–53)
HGB BLD-MCNC: 11.9 G/DL (ref 13.3–17.7)
IMM GRANULOCYTES # BLD: 0.2 10E3/UL
IMM GRANULOCYTES NFR BLD: 2 %
INR PPP: 3.32 (ref 0.85–1.15)
LDH SERPL L TO P-CCNC: 323 U/L (ref 0–250)
LYMPHOCYTES # BLD AUTO: 2.7 10E3/UL (ref 0.8–5.3)
LYMPHOCYTES NFR BLD AUTO: 19 %
MCH RBC QN AUTO: 28.3 PG (ref 26.5–33)
MCHC RBC AUTO-ENTMCNC: 30.1 G/DL (ref 31.5–36.5)
MCV RBC AUTO: 94 FL (ref 78–100)
MONOCYTES # BLD AUTO: 1.6 10E3/UL (ref 0–1.3)
MONOCYTES NFR BLD AUTO: 11 %
NEUTROPHILS # BLD AUTO: 8.4 10E3/UL (ref 1.6–8.3)
NEUTROPHILS NFR BLD AUTO: 57 %
NRBC # BLD AUTO: 0 10E3/UL
NRBC BLD AUTO-RTO: 0 /100
PLATELET # BLD AUTO: 313 10E3/UL (ref 150–450)
POTASSIUM SERPL-SCNC: 4.8 MMOL/L (ref 3.4–5.3)
PROT SERPL-MCNC: 7 G/DL (ref 6.4–8.3)
RBC # BLD AUTO: 4.2 10E6/UL (ref 4.4–5.9)
SODIUM SERPL-SCNC: 134 MMOL/L (ref 136–145)
WBC # BLD AUTO: 14.4 10E3/UL (ref 4–11)

## 2023-05-03 PROCEDURE — 83615 LACTATE (LD) (LDH) ENZYME: CPT

## 2023-05-03 PROCEDURE — 93010 ELECTROCARDIOGRAM REPORT: CPT | Performed by: INTERNAL MEDICINE

## 2023-05-03 PROCEDURE — 93306 TTE W/DOPPLER COMPLETE: CPT | Mod: 26 | Performed by: INTERNAL MEDICINE

## 2023-05-03 PROCEDURE — 80323 ALKALOIDS NOS: CPT

## 2023-05-03 PROCEDURE — 36415 COLL VENOUS BLD VENIPUNCTURE: CPT

## 2023-05-03 PROCEDURE — 93750 INTERROGATION VAD IN PERSON: CPT | Performed by: NURSE PRACTITIONER

## 2023-05-03 PROCEDURE — 93005 ELECTROCARDIOGRAM TRACING: CPT

## 2023-05-03 PROCEDURE — 85610 PROTHROMBIN TIME: CPT

## 2023-05-03 PROCEDURE — 85025 COMPLETE CBC W/AUTO DIFF WBC: CPT

## 2023-05-03 PROCEDURE — G0480 DRUG TEST DEF 1-7 CLASSES: HCPCS

## 2023-05-03 PROCEDURE — G0463 HOSPITAL OUTPT CLINIC VISIT: HCPCS | Mod: 25 | Performed by: NURSE PRACTITIONER

## 2023-05-03 PROCEDURE — 99214 OFFICE O/P EST MOD 30 MIN: CPT | Mod: 24 | Performed by: NURSE PRACTITIONER

## 2023-05-03 PROCEDURE — 80053 COMPREHEN METABOLIC PANEL: CPT

## 2023-05-03 PROCEDURE — 93306 TTE W/DOPPLER COMPLETE: CPT

## 2023-05-03 PROCEDURE — 80321 ALCOHOLS BIOMARKERS 1OR 2: CPT

## 2023-05-03 ASSESSMENT — PAIN SCALES - GENERAL: PAINLEVEL: NO PAIN (0)

## 2023-05-03 NOTE — LETTER
5/3/2023      RE: Akshat Fragoso  [address]  [facility] [address]       Dear Colleague,    Thank you for the opportunity to participate in the care of your patient, Akshat Fragoso, at the [facility] HEART CLINIC [place] at [facility]. Please see a copy of my visit note below.      [facility] Cardiology   VAD Clinic      HPI:   Mr. Fragoso is a 57 year old male with medical history pertinent for HFrEF (10%) secondary to NICM (suspected viral etiology) s/p ICD, moderate TR s/p TV repair, pAF, chronic tobacco/marijuana use, COPD, HTN, CKD stage III who was admitted to [facility] on 3/15/23 following a VT/VF arrest. He underwent chest compressions and ROSC was obtained within 6 minutes. He was evaluated for placement of an LVAD and completed an extensive pre-operative work-up.  On 3/23/23 he underwent placement of HM3 LVAD. He presents to clinic for routine follow up.     With regards to his recent hospitalization, Mr. Fragoso underwent successful placement of HM3 LVAD on 3/23/23. His post-op course was relatively uncomplicated. He was started on ASA 81 mg daily, lisinopril 2.5 mg daily, digoxin and spironolactone 12.5 mg daily. He developed post-operative atrial fibrillation with RVR on 3/28. Started on amiodarone gtt and converted to NSR. He was transitioned to PO taper and decreased to 200 mg daily. He was started on coumadin for his LVAD and bridged with heparin. INR was therapeutic at discharge.      He was diuresed with intermittent boluses of IV bumex to his pre-operative weight and transitioned to bumex 1 mg PO daily on 4/6.      He had a post-operative leukocytosis. CXR showed a left sided consolidation concerning for pneumonia. He was started on empiric vanc/zosyn from 3/29 to 4/4. ID was consulted. Cultures remained negative and CT chest 4/3 showed no source of infection. ID consulted and recommending watching off antibiotics. Mr. Fragoso was discharged home on  4/9/2023.     At subsequent clinic follow ups through April Mr. Fragoso has persistent leukocytosis with WBC trending up to 17 and CRP 22. On 4/18 CT chest/abdomen/pelvis was obtained and showed stable findings. Labs showed FRANCINE with Cr 1.4. Appeared euvolemic to mildly hypovolemic at last visit with Dr. Aguiar. Bumex was reduced to every other day (M/W/F).      Today, Mr. Fragoso presents to clinic with his wife. He is feeling quite well. His chief complaint is persistent rib pain from fractured ribs that occurred with cardiac arrest/compressions. He is following up with pain clinic on 5/18. Altaf HF symptoms including shortness of breath, dyspnea on exertion, orthopnea, PND, LE edema. Denies any lightheadedness or dizziness, syncope, or near syncope. Denies any ICD shocks.     Underwent echo speed optimization today. Speed range evaluated: 5000 - 5300 rpm's. ECHO terminated at 5300rpm s/t low PI's and small LVESD. Speed optimized at 5200 rpm.      Home weights stable 165-170 lb, stable.     No blood in the urine or blood in the stool or prolonged nosebleeds.     No fevers or chills. Driveline redness or pain.  No driveline drainage.     No headaches or vision changes. No stroke symptoms.     No LVAD alarms.     VAD interrogation 05/03/23: VAD interrogation reviewed with VAD coordinator. Agree with findings. Occasional PI events (lowest 1.9), no power spikes, speed drops, or other findings suspicious of pump malfunction noted.       Cardiac Medications  - ASA 81 mg daily  - Amiodarone 200 mg daily   - Bumex 1 mg MWF  - Digoxin 250 mcg daily   - Jardiance 10 mg daily  - Lisinopril 10 mg daily   - Spironolactone 12.5 mg daily   - Warfarin         PAST MEDICAL HISTORY:  Past Medical History:   Diagnosis Date     Acute right lumbar radiculopathy 11/02/2015     Acute systolic heart failure (H) 01/10/2017     Cardiomyopathy (H) 01/10/2017     CKD (chronic kidney disease) stage 3, GFR 30-59 ml/min (H) 01/30/2020     JOHNSON  (dyspnea on exertion)      ED (erectile dysfunction) 10/02/2019     Erectile dysfunction      ICD (implantable cardioverter-defibrillator) in place- [employer], single chamber- NOT dependent 10/09/2017     Personal history of smoking 01/04/2017     Pulmonary nodules 01/17/2017    CT 11/2018:  Bilateral pulmonary nodules measuring up to 4 mm. No new or enlarging pulmonary nodules.       FAMILY HISTORY:  Family History   Problem Relation Age of Onset     Parkinsonism Mother      Atrial fibrillation Father      Heart Failure Father      Prostate Cancer Father      Skin Cancer Father      Anorexia nervosa Daughter      Other - See Comments Granddaughter         premature birth       SOCIAL HISTORY:  Social History     Socioeconomic History     Marital status:      Spouse name: Cheryl     Number of children: 2   Occupational History     Occupation: Construction      Employer: SELF   Tobacco Use     Smoking status: Light Smoker     Packs/day: 0.25     Years: 15.00     Pack years: 3.75     Types: Cigarettes     Smokeless tobacco: Former     Quit date: 10/24/2011   Vaping Use     Vaping status: Never Used   Substance and Sexual Activity     Alcohol use: No     Alcohol/week: 0.0 standard drinks of alcohol     Drug use: No     Sexual activity: Yes     Partners: Female     Birth control/protection: Condom   Other Topics Concern     Parent/sibling w/ CABG, MI or angioplasty before 65F 55M? Yes     Comment: both parents had stints placed        CURRENT MEDICATIONS:  acetaminophen (TYLENOL) 325 MG tablet, Take 2 tablets (650 mg) by mouth every 4 hours as needed for other (For optimal non-opioid multimodal pain management to improve pain control.)  amiodarone (PACERONE) 200 MG tablet, Take 1 tablet (200 mg) by mouth daily  amoxicillin (AMOXIL) 500 MG tablet, Take 4 tablets (2,000 mg) by mouth once as needed (Take one hour before dental procedure.)  aspirin (ASA) 81 MG chewable tablet, 1 tablet (81 mg) by Oral or  NG Tube route daily  bumetanide (BUMEX) 1 MG tablet, Take 1mg every Monday, Wednesday, and Friday  digoxin (LANOXIN) 250 MCG tablet, Take 1 tablet (250 mcg) by mouth daily  empagliflozin (JARDIANCE) 10 MG TABS tablet, Take 1 tablet (10 mg) by mouth daily  gabapentin (NEURONTIN) 300 MG capsule, 1 capsule (300 mg) by Oral or Feeding Tube route 3 times daily  lisinopril (ZESTRIL) 10 MG tablet, Take 1 tablet (10 mg) by mouth daily  methocarbamol (ROBAXIN) 500 MG tablet, Take 1 tablet (500 mg) by mouth 4 times daily  nystatin (MYCOSTATIN) 133791 UNIT/ML suspension, Take 5 mLs (500,000 Units) by mouth 4 times daily  oxyCODONE (ROXICODONE) 5 MG tablet, Take 1 tablet (5 mg) by mouth every 8 hours as needed for severe pain  pantoprazole (PROTONIX) 40 MG EC tablet, Take 1 tablet (40 mg) by mouth every morning (before breakfast)  polyethylene glycol (MIRALAX) 17 GM/Dose powder, Take 17 g by mouth daily  senna-docusate (SENOKOT-S/PERICOLACE) 8.6-50 MG tablet, Take 2 tablets by mouth 2 times daily as needed for constipation  spironolactone (ALDACTONE) 25 MG tablet, Take 0.5 tablets (12.5 mg) by mouth daily  warfarin ANTICOAGULANT (COUMADIN) 5 MG tablet, Take 1 tablet (5 mg) by mouth at 6 pm on 4/9 and have INR check 4/10 and have dose adjusted    No current facility-administered medications on file prior to visit.      ROS:   CONSTITUTIONAL: Denies fever, chills, fatigue, or weight fluctuations.   HEENT: Denies headache, vision changes, and changes in speech.   CV: Refer to HPI.   PULMONARY:Refer to HPI.   GI:Denies nausea, vomiting, diarrhea, and abdominal pain. Bowel movements are regular.   :Denies urinary alterations, dysuria, urinary frequency, hematuria, and abnormal drainage.   EXT:Denies lower extremity edema.   SKIN:Denies abnormal rashes or lesions.   MUSCULOSKELETAL:Denies upper or lower extremity weakness and pain.   NEUROLOGIC:Denies lightheadedness, dizziness, seizures, or upper or lower extremity paresthesia.  "    EXAM:  BP (!) 60/0 (BP Location: Right arm, Patient Position: Chair, Cuff Size: Adult Regular)   Pulse 60   Ht 1.691 m (5' 6.58\")   Wt 81.5 kg (179 lb 9.6 oz)   SpO2 95%   BMI 28.49 kg/m     GENERAL: Appears comfortable, in no distress .  HEENT: Eye symmetrical and without discharge or icterus bilaterally. Mucous membranes moist and without lesions.  NECK: Supple, JVD < 6 cm   CV:  + LVAD hum  RESPIRATORY: Respirations regular, even, and unlabored. Lungs CTA throughout.   GI: Soft and non distended with normoactive bowel sounds present in all quadrants. No tenderness, rebound, guarding. No organomegaly.   EXTREMITIES: No peripheral edema. Non-pulsatile.   NEUROLOGIC: Alert and orientated x 3.  No focal deficits.   MUSCULOSKELETAL: No joint swelling or tenderness.   SKIN: No jaundice. No rashes or lesions. Driveline dressing CDI.    Labs - as reviewed in clinic with patient today:  CBC RESULTS:  Lab Results   Component Value Date    WBC 16.9 (H) 04/25/2023    WBC 9.8 11/20/2019    RBC 4.38 (L) 04/25/2023    RBC 4.70 11/20/2019    HGB 12.8 (L) 04/25/2023    HGB 14.3 11/20/2019    HCT 40.6 04/25/2023    HCT 45.3 11/20/2019    MCV 93 04/25/2023    MCV 96 11/20/2019    MCH 29.2 04/25/2023    MCH 30.4 11/20/2019    MCHC 31.5 04/25/2023    MCHC 31.6 11/20/2019    RDW 15.1 (H) 04/25/2023    RDW 13.0 11/20/2019     04/25/2023     11/20/2019       CMP RESULTS:  Lab Results   Component Value Date     04/25/2023     11/20/2019    POTASSIUM 4.5 04/25/2023    POTASSIUM 3.3 (L) 03/23/2023    POTASSIUM 4.5 11/20/2019    CHLORIDE 95 (L) 04/25/2023    CHLORIDE 105 11/20/2019    CO2 32 (H) 04/25/2023    CO2 28 11/20/2019    ANIONGAP 9 04/25/2023    ANIONGAP 5 11/20/2019     (H) 04/25/2023     (H) 04/09/2023     (H) 11/20/2019    BUN 32.2 (H) 04/25/2023    BUN 23 11/20/2019    CR 1.39 (H) 04/25/2023    CR 0.90 11/20/2019    GFRESTIMATED 59 (L) 04/25/2023    GFRESTIMATED >90 " 11/20/2019    GFRESTBLACK >90 11/20/2019    TONY 9.9 04/25/2023    TONY 8.7 11/20/2019    BILITOTAL 0.6 04/25/2023    BILITOTAL 0.3 11/20/2019    ALBUMIN 4.1 04/25/2023    ALBUMIN 3.6 11/20/2019    ALKPHOS 145 (H) 04/25/2023    ALKPHOS 68 11/20/2019    ALT 25 04/25/2023    ALT 22 11/20/2019    AST 37 04/25/2023    AST 16 11/20/2019        INR RESULTS:  Lab Results   Component Value Date    INR 2.88 (H) 04/25/2023    INR 1.01 11/19/2018       Lab Results   Component Value Date    MAG 1.9 04/08/2023    MAG 2.1 01/12/2017     Lab Results   Component Value Date    NTBNPI 31,087 (H) 03/20/2023    NTBNPI 11,183 (H) 01/09/2017     Lab Results   Component Value Date    NTBNP 7,719 (H) 04/18/2023    NTBNP 2,306 (H) 11/20/2019         Cardiac Diagnostics    5/3/2023 Echo Speed Optimization   Interpretation Summary  Please refer to the EPIC report for measurements performed at different LVAD  speed settings.  HM3 at 5200RPM at baseline.  LVIDd 43mm.  Septum normal.  Aortic valve remain closed at baseline.    Left Ventricle  Aortic valve closed at baseline, partially open at 5000RPM. Please refer to  the EPIC report for measurements performed at different LVAD speed settings.  LVAD cannula was seen in the expected anatomic position in the LV apex. HM3 at  5200RPM at baseline.  LVIDd 43mm.  Septum normal.  Aortic valve remain closed at baseline.    4/12/23 Device Interrogation   Device: [employer] D150 [id number]  Normal device function  Mode: VVI 40 bpm  : 0%  Intrinsic rhythm: VS @ 91 bpm  R waves measured 2 mV today which is not a new finding since LVAD implant on 3/23/23. RV threshold measured 1.5 V @ 0.4 ms  Estimated battery longevity to RRT = 10.5 years  Anticoagulant: warfarin  Ventricular Arrhythmia: 1 episode recorded in the VT monitor zone on 3/28/23 @ 1303 - 14 sec, 175 bpm  Setting Changes: None    3/28/2023 Echo  Interpretation Summary  Technically difficult study.Extremely poor acoustic windows.  Limited  information was obtained during study.  LVAD cannula was seen in the expected anatomic position in the LV apex.  HM3 at 5200RPM.  Septum normal.  LVIDd 56mm.  Aortic valve not well visualized. No AI.  Normal outflow velocity.  Global right ventricular function is moderately to severely reduced.  Small pericardial effusion with organizing material in pericardial cavity.      Assessment and Plan:   Mr. Fragoso is a 57 year old male with medical history pertinent for HFrEF (10%) secondary to NICM (suspected viral etiology) s/p ICD, moderate TR s/p TV repair, pAF, chronic tobacco/marijuana use, COPD, HTN, CKD stage III who was admitted to [facility] on 3/15/23 following a VT/VF arrest. He underwent chest compressions and ROSC was obtained within 6 minutes. He was evaluated for placement of an LVAD and completed an extensive pre-operative work-up.  On 3/23/23 he underwent placement of HM3 LVAD. He presents to clinic for routine follow up.   Appearing and feeling well. Euvolemic to slightly hypovolemic. Cr and BUN continue to trend up. MAPs borderline low at 60 with occasional low PIs.  Instructed to hold bumex until follow up with Dr. Aguiar. WBC trending down.        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 NYHA Class III.    Fluid status: hypovolemic, HOLD Bumex 1 mg every other day (M,W,F)  RV support/rate control: digoxin 250 mcg daily, last level 1.8 (for rate control)  ACEi/ARB/ARNi/afterload reduction: lisinopril 10 mg daily   BB: deferred due to recent VAD  Aldosterone antagonist: spironolactone 12.5 mg daily  SGLT2i: empagliflozin 10 mg daily  SCD prophylaxis: ICD, reprogrammed device to provide ATP burst at VT threshold  MAP: 60  LDH trends: 323  Anticoagulation: warfarin dosing per pharmacy, dose increased  yesterday, INR goal 2-3, today 2.23  Antiplatelet: ASA 81 mg daily     # Atrial fibrillation with RVR 3/28, resolved  Received adenosine 3/28/23 which slowed rate. Then started on amiodarone gtt.  - continue amiodarone 200 mg daily    # Mild leukocytopenia, stable  No localizing symptoms of infection. Repeat CT Chest/Abd/Pelvis on 4/18/2023 for persistent leukocytosis with stable findings. CRP trended down.  - WBC 14.4, trending down          Follow up:  - Dr. Aguiar 5/9  - VAD RABIA in 5 weeks       Mile Bolivar DNP, NP-C  Advance Heart Failure  5/3/2023

## 2023-05-03 NOTE — PROGRESS NOTES
ANTICOAGULATION MANAGEMENT     Akshat Fragoso 58 year old male is on warfarin with supratherapeutic INR result. (Goal INR 2.0-3.0)    Recent labs: (last 7 days)     05/03/23  0943   INR 3.32*       ASSESSMENT       Source(s): Chart Review       Warfarin doses taken: Reviewed in chart    Diet: No new diet changes identified    Medication/supplement changes: Patient continues on Amiodarone, Tylenol 1000mg Q 8 hours and prn Oxycodone    New illness, injury, or hospitalization: No    Signs or symptoms of bleeding or clotting: No    Previous result: Therapeutic last visit; previously outside of goal range    Additional findings: None         PLAN     Recommended plan for ongoing change(s) affecting INR     Dosing Instructions: decrease your warfarin dose (7% change) with next INR in 6 days       Summary  As of 5/3/2023    Full warfarin instructions:  2.5 mg every Wed; 5 mg all other days   Next INR check:  5/9/2023             Sent [employer] message with dosing and follow up instructions    Check at provider office visit    Education provided:     Please call back if any changes to your diet, medications or how you've been taking warfarin    Contact [phone] with any changes, questions or concerns.     Plan made per [facility] anticoagulation protocol    Merry Reyes RN  Anticoagulation Clinic  5/3/2023    _______________________________________________________________________     Anticoagulation Episode Summary     Current INR goal:  2.0-3.0   TTR:  19.5 % (2.3 wk)   Target end date:  Indefinite   Send INR reminders to:  ANTICOAG LVAD    Indications    Acute on chronic systolic congestive heart failure (H) [I50.23]  LVAD (left ventricular assist device) present (H) [Z95.811]  Chronic systolic congestive heart failure (H) [I50.22]  Long term use of drug [Z79.899]  Left ventricular assist device present (H) [Z95.811]  Anticoagulated on Coumadin [Z79.01]           Comments:  Follow VAD Anticoag protocol:Yes: HeartMate 3    Bridging: Enoxaparin   Date VAD placed: 3/23/23         Anticoagulation Care Providers     Provider Role Specialty Phone number    Kenzie Moreau MD Referring Advanced Heart Failure and Transplant Cardiology [phone]    Mile Bolivar, VERONICA CNP Referring Nurse Practitioner [phone]    Shaun Aguiar MD Referring Cardiovascular Disease [phone]

## 2023-05-03 NOTE — PROGRESS NOTES
VAD coordinator present for VAD speed optimization echo.   VAD Coordinator adjusted speed, monitored VAD parameters and EKG, LV size, patient tolerance, and recorded findings according to Speed Optimization protocol. See Speed Optimization sheet for full results.   Parameters pre-optimization: Speed: 5200 rpm, Flow: 4.8 lpm, PI: 3.3, Power: 3.8 mari  Speed range evaluated: 5000 - 5300 rpm's. ECHO terminated at 5300rpm s/t low PI's and small LVESD. Protocol guidelines followed.

## 2023-05-03 NOTE — PATIENT INSTRUCTIONS
Medications:  HOLD Bumex until your appointment next Tuesday with Dr. Aguiar.    Instructions:  Attend pain clinic appointment to establish pain control plan.  Make sure to drink enough water every day!   Call [facility] and order weekly dressing kits and Aquaguard for showering. Do not shower until given the green light next week!     Follow-up: (make these appointments before you leave)  1. Please follow-up with VAD RABIA in the fiirst week of Dayna months with labs prior.   2. Please follow-up with Dr. Aguiar net Tuesday with labs prior.        Page the VAD Coordinator on call if you gain more than 3 lb in a day or 5 in a week. Please also page if you feel unwell or have alarms.   Great to see you in clinic today. To Page the VAD Coordinator on call, dial [phone] option #[phone] and ask to speak to the VAD coordinator on call.

## 2023-05-03 NOTE — PROGRESS NOTES
[facility] Cardiology   VAD Clinic      HPI:   Mr. Fragoso is a 57 year old male with medical history pertinent for HFrEF (10%) secondary to NICM (suspected viral etiology) s/p ICD, moderate TR s/p TV repair, pAF, chronic tobacco/marijuana use, COPD, HTN, CKD stage III who was admitted to [facility] on 3/15/23 following a VT/VF arrest. He underwent chest compressions and ROSC was obtained within 6 minutes. He was evaluated for placement of an LVAD and completed an extensive pre-operative work-up.  On 3/23/23 he underwent placement of HM3 LVAD. He presents to clinic for routine follow up.     With regards to his recent hospitalization, Mr. Fragoso underwent successful placement of HM3 LVAD on 3/23/23. His post-op course was relatively uncomplicated. He was started on ASA 81 mg daily, lisinopril 2.5 mg daily, digoxin and spironolactone 12.5 mg daily. He developed post-operative atrial fibrillation with RVR on 3/28. Started on amiodarone gtt and converted to NSR. He was transitioned to PO taper and decreased to 200 mg daily. He was started on coumadin for his LVAD and bridged with heparin. INR was therapeutic at discharge.      He was diuresed with intermittent boluses of IV bumex to his pre-operative weight and transitioned to bumex 1 mg PO daily on 4/6.      He had a post-operative leukocytosis. CXR showed a left sided consolidation concerning for pneumonia. He was started on empiric vanc/zosyn from 3/29 to 4/4. ID was consulted. Cultures remained negative and CT chest 4/3 showed no source of infection. ID consulted and recommending watching off antibiotics. Mr. Fragoso was discharged home on 4/9/2023.     At subsequent clinic follow ups through April Mr. Fragoso has persistent leukocytosis with WBC trending up to 17 and CRP 22. On 4/18 CT chest/abdomen/pelvis was obtained and showed stable findings. Labs showed FRANCINE with Cr 1.4. Appeared euvolemic to mildly hypovolemic at last visit with Dr. Aguiar. Bumex was reduced to every  other day (M/W/F).      Today, Mr. Fragoso presents to clinic with his wife. He is feeling quite well. His chief complaint is persistent rib pain from fractured ribs that occurred with cardiac arrest/compressions. He is following up with pain clinic on 5/18. Altaf HF symptoms including shortness of breath, dyspnea on exertion, orthopnea, PND, LE edema. Denies any lightheadedness or dizziness, syncope, or near syncope. Denies any ICD shocks.     Underwent echo speed optimization today. Speed range evaluated: 5000 - 5300 rpm's. ECHO terminated at 5300rpm s/t low PI's and small LVESD. Speed optimized at 5200 rpm.      Home weights stable 165-170 lb, stable.     No blood in the urine or blood in the stool or prolonged nosebleeds.     No fevers or chills. Driveline redness or pain.  No driveline drainage.     No headaches or vision changes. No stroke symptoms.     No LVAD alarms.     VAD interrogation 05/03/23: VAD interrogation reviewed with VAD coordinator. Agree with findings. Occasional PI events (lowest 1.9), no power spikes, speed drops, or other findings suspicious of pump malfunction noted.       Cardiac Medications  - ASA 81 mg daily  - Amiodarone 200 mg daily   - Bumex 1 mg MWF  - Digoxin 250 mcg daily   - Jardiance 10 mg daily  - Lisinopril 10 mg daily   - Spironolactone 12.5 mg daily   - Warfarin         PAST MEDICAL HISTORY:  Past Medical History:   Diagnosis Date     Acute right lumbar radiculopathy 11/02/2015     Acute systolic heart failure (H) 01/10/2017     Cardiomyopathy (H) 01/10/2017     CKD (chronic kidney disease) stage 3, GFR 30-59 ml/min (H) 01/30/2020     JOHNSON (dyspnea on exertion)      ED (erectile dysfunction) 10/02/2019     Erectile dysfunction      ICD (implantable cardioverter-defibrillator) in place- [employer], single chamber- NOT dependent 10/09/2017     Personal history of smoking 01/04/2017     Pulmonary nodules 01/17/2017    CT 11/2018:  Bilateral pulmonary nodules measuring up  to 4 mm. No new or enlarging pulmonary nodules.       FAMILY HISTORY:  Family History   Problem Relation Age of Onset     Parkinsonism Mother      Atrial fibrillation Father      Heart Failure Father      Prostate Cancer Father      Skin Cancer Father      Anorexia nervosa Daughter      Other - See Comments Granddaughter         premature birth       SOCIAL HISTORY:  Social History     Socioeconomic History     Marital status:      Spouse name: Cheryl     Number of children: 2   Occupational History     Occupation: [email or web address]      Employer: SELF   Tobacco Use     Smoking status: Light Smoker     Packs/day: 0.25     Years: 15.00     Pack years: 3.75     Types: Cigarettes     Smokeless tobacco: Former     Quit date: 10/24/2011   Vaping Use     Vaping status: Never Used   Substance and Sexual Activity     Alcohol use: No     Alcohol/week: 0.0 standard drinks of alcohol     Drug use: No     Sexual activity: Yes     Partners: Female     Birth control/protection: Condom   Other Topics Concern     Parent/sibling w/ CABG, MI or angioplasty before 65F 55M? Yes     Comment: both parents had stints placed        CURRENT MEDICATIONS:  acetaminophen (TYLENOL) 325 MG tablet, Take 2 tablets (650 mg) by mouth every 4 hours as needed for other (For optimal non-opioid multimodal pain management to improve pain control.)  amiodarone (PACERONE) 200 MG tablet, Take 1 tablet (200 mg) by mouth daily  amoxicillin (AMOXIL) 500 MG tablet, Take 4 tablets (2,000 mg) by mouth once as needed (Take one hour before dental procedure.)  aspirin (ASA) 81 MG chewable tablet, 1 tablet (81 mg) by Oral or NG Tube route daily  bumetanide (BUMEX) 1 MG tablet, Take 1mg every Monday, Wednesday, and Friday  digoxin (LANOXIN) 250 MCG tablet, Take 1 tablet (250 mcg) by mouth daily  empagliflozin (JARDIANCE) 10 MG TABS tablet, Take 1 tablet (10 mg) by mouth daily  gabapentin (NEURONTIN) 300 MG capsule, 1 capsule (300 mg) by Oral or Feeding Tube route  "3 times daily  lisinopril (ZESTRIL) 10 MG tablet, Take 1 tablet (10 mg) by mouth daily  methocarbamol (ROBAXIN) 500 MG tablet, Take 1 tablet (500 mg) by mouth 4 times daily  nystatin (MYCOSTATIN) 505780 UNIT/ML suspension, Take 5 mLs (500,000 Units) by mouth 4 times daily  oxyCODONE (ROXICODONE) 5 MG tablet, Take 1 tablet (5 mg) by mouth every 8 hours as needed for severe pain  pantoprazole (PROTONIX) 40 MG EC tablet, Take 1 tablet (40 mg) by mouth every morning (before breakfast)  polyethylene glycol (MIRALAX) 17 GM/Dose powder, Take 17 g by mouth daily  senna-docusate (SENOKOT-S/PERICOLACE) 8.6-50 MG tablet, Take 2 tablets by mouth 2 times daily as needed for constipation  spironolactone (ALDACTONE) 25 MG tablet, Take 0.5 tablets (12.5 mg) by mouth daily  warfarin ANTICOAGULANT (COUMADIN) 5 MG tablet, Take 1 tablet (5 mg) by mouth at 6 pm on 4/9 and have INR check 4/10 and have dose adjusted    No current facility-administered medications on file prior to visit.      ROS:   CONSTITUTIONAL: Denies fever, chills, fatigue, or weight fluctuations.   HEENT: Denies headache, vision changes, and changes in speech.   CV: Refer to HPI.   PULMONARY:Refer to HPI.   GI:Denies nausea, vomiting, diarrhea, and abdominal pain. Bowel movements are regular.   :Denies urinary alterations, dysuria, urinary frequency, hematuria, and abnormal drainage.   EXT:Denies lower extremity edema.   SKIN:Denies abnormal rashes or lesions.   MUSCULOSKELETAL:Denies upper or lower extremity weakness and pain.   NEUROLOGIC:Denies lightheadedness, dizziness, seizures, or upper or lower extremity paresthesia.     EXAM:  BP (!) 60/0 (BP Location: Right arm, Patient Position: Chair, Cuff Size: Adult Regular)   Pulse 60   Ht 1.691 m (5' 6.58\")   Wt 81.5 kg (179 lb 9.6 oz)   SpO2 95%   BMI 28.49 kg/m     GENERAL: Appears comfortable, in no distress .  HEENT: Eye symmetrical and without discharge or icterus bilaterally. Mucous membranes moist and " without lesions.  NECK: Supple, JVD < 6 cm   CV:  + LVAD hum  RESPIRATORY: Respirations regular, even, and unlabored. Lungs CTA throughout.   GI: Soft and non distended with normoactive bowel sounds present in all quadrants. No tenderness, rebound, guarding. No organomegaly.   EXTREMITIES: No peripheral edema. Non-pulsatile.   NEUROLOGIC: Alert and orientated x 3.  No focal deficits.   MUSCULOSKELETAL: No joint swelling or tenderness.   SKIN: No jaundice. No rashes or lesions. Driveline dressing CDI.    Labs - as reviewed in clinic with patient today:  CBC RESULTS:  Lab Results   Component Value Date    WBC 16.9 (H) 04/25/2023    WBC 9.8 11/20/2019    RBC 4.38 (L) 04/25/2023    RBC 4.70 11/20/2019    HGB 12.8 (L) 04/25/2023    HGB 14.3 11/20/2019    HCT 40.6 04/25/2023    HCT 45.3 11/20/2019    MCV 93 04/25/2023    MCV 96 11/20/2019    MCH 29.2 04/25/2023    MCH 30.4 11/20/2019    MCHC 31.5 04/25/2023    MCHC 31.6 11/20/2019    RDW 15.1 (H) 04/25/2023    RDW 13.0 11/20/2019     04/25/2023     11/20/2019       CMP RESULTS:  Lab Results   Component Value Date     04/25/2023     11/20/2019    POTASSIUM 4.5 04/25/2023    POTASSIUM 3.3 (L) 03/23/2023    POTASSIUM 4.5 11/20/2019    CHLORIDE 95 (L) 04/25/2023    CHLORIDE 105 11/20/2019    CO2 32 (H) 04/25/2023    CO2 28 11/20/2019    ANIONGAP 9 04/25/2023    ANIONGAP 5 11/20/2019     (H) 04/25/2023     (H) 04/09/2023     (H) 11/20/2019    BUN 32.2 (H) 04/25/2023    BUN 23 11/20/2019    CR 1.39 (H) 04/25/2023    CR 0.90 11/20/2019    GFRESTIMATED 59 (L) 04/25/2023    GFRESTIMATED >90 11/20/2019    GFRESTBLACK >90 11/20/2019    TONY 9.9 04/25/2023    TONY 8.7 11/20/2019    BILITOTAL 0.6 04/25/2023    BILITOTAL 0.3 11/20/2019    ALBUMIN 4.1 04/25/2023    ALBUMIN 3.6 11/20/2019    ALKPHOS 145 (H) 04/25/2023    ALKPHOS 68 11/20/2019    ALT 25 04/25/2023    ALT 22 11/20/2019    AST 37 04/25/2023    AST 16 11/20/2019        INR  RESULTS:  Lab Results   Component Value Date    INR 2.88 (H) 04/25/2023    INR 1.01 11/19/2018       Lab Results   Component Value Date    MAG 1.9 04/08/2023    MAG 2.1 01/12/2017     Lab Results   Component Value Date    NTBNPI 31,087 (H) 03/20/2023    NTBNPI 11,183 (H) 01/09/2017     Lab Results   Component Value Date    NTBNP 7,719 (H) 04/18/2023    NTBNP 2,306 (H) 11/20/2019         Cardiac Diagnostics    5/3/2023 Echo Speed Optimization   Interpretation Summary  Please refer to the EPIC report for measurements performed at different LVAD  speed settings.  HM3 at 5200RPM at baseline.  LVIDd 43mm.  Septum normal.  Aortic valve remain closed at baseline.    Left Ventricle  Aortic valve closed at baseline, partially open at 5000RPM. Please refer to  the EPIC report for measurements performed at different LVAD speed settings.  LVAD cannula was seen in the expected anatomic position in the LV apex. HM3 at  5200RPM at baseline.  LVIDd 43mm.  Septum normal.  Aortic valve remain closed at baseline.    4/12/23 Device Interrogation   Device: [employer] D150 DYNAGEN  Normal device function  Mode: VVI 40 bpm  : 0%  Intrinsic rhythm: VS @ 91 bpm  R waves measured 2 mV today which is not a new finding since LVAD implant on 3/23/23. RV threshold measured 1.5 V @ 0.4 ms  Estimated battery longevity to RRT = 10.5 years  Anticoagulant: warfarin  Ventricular Arrhythmia: 1 episode recorded in the VT monitor zone on 3/28/23 @ 1303 - 14 sec, 175 bpm  Setting Changes: None    3/28/2023 Echo  Interpretation Summary  Technically difficult study.Extremely poor acoustic windows.  Limited information was obtained during study.  LVAD cannula was seen in the expected anatomic position in the LV apex.  HM3 at 5200RPM.  Septum normal.  LVIDd 56mm.  Aortic valve not well visualized. No AI.  Normal outflow velocity.  Global right ventricular function is moderately to severely reduced.  Small pericardial effusion with organizing material  in pericardial cavity.      Assessment and Plan:   Mr. Fragoso is a 57 year old male with medical history pertinent for HFrEF (10%) secondary to NICM (suspected viral etiology) s/p ICD, moderate TR s/p TV repair, pAF, chronic tobacco/marijuana use, COPD, HTN, CKD stage III who was admitted to [facility] on 3/15/23 following a VT/VF arrest. He underwent chest compressions and ROSC was obtained within 6 minutes. He was evaluated for placement of an LVAD and completed an extensive pre-operative work-up.  On 3/23/23 he underwent placement of HM3 LVAD. He presents to clinic for routine follow up.   Appearing and feeling well. Euvolemic to slightly hypovolemic. Cr and BUN continue to trend up. MAPs borderline low at 60 with occasional low PIs.  Instructed to hold bumex until follow up with Dr. Aguiar. WBC trending down.        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 NYHA Class III.    Fluid status: hypovolemic, HOLD Bumex 1 mg every other day (M,W,F)  RV support/rate control: digoxin 250 mcg daily, last level 1.8 (for rate control)  ACEi/ARB/ARNi/afterload reduction: lisinopril 10 mg daily   BB: deferred due to recent VAD  Aldosterone antagonist: spironolactone 12.5 mg daily  SGLT2i: empagliflozin 10 mg daily  SCD prophylaxis: ICD, reprogrammed device to provide ATP burst at VT threshold  MAP: 60  LDH trends: 323  Anticoagulation: warfarin dosing per pharmacy, dose increased yesterday, INR goal 2-3, today 2.23  Antiplatelet: ASA 81 mg daily     # Atrial fibrillation with RVR 3/28, resolved  Received adenosine 3/28/23 which slowed rate. Then started on amiodarone gtt.  - continue amiodarone 200 mg daily    # Mild leukocytopenia, stable  No localizing symptoms of infection. Repeat CT Chest/Abd/Pelvis on 4/18/2023 for persistent  leukocytosis with stable findings. CRP trended down.  - WBC 14.4, trending down          Follow up:  - Dr. Aguiar 5/9  - VAD RABIA in 5 weeks       Mile Bolivar DNP, NP-C  Advance Heart Failure  5/3/2023

## 2023-05-03 NOTE — NURSING NOTE
Chief Complaint   Patient presents with     Follow Up     Return VAD     Vitals were taken and medications reconciled.    Jose L Cruz, ELYSSA  12:03 PM

## 2023-05-03 NOTE — NURSING NOTE
[facility]CS VAD Pump Info     Row Name 05/03/23 1300             MCS VAD Information    Implant LVAD      LVAD Pump HeartMate 3         Heartmate 3 LEFT VS    Flow (Lpm) 4.3 Lpm      Pulse Index (PI) 2.1 PI      Speed (rpm) 5200 rpm      Power (mari) 4.1 mari      Current Hct setting 40      Retired: Unexpected Alarms --         Primary Controller    Serial number [id number]      Low flow alarm setting --      High watt alarm setting --      EBB: Patient use 11      Replace in 28 Months         Backup Controller    Serial number [id number]      EBB: Patient use 4      Replace EBB in 29 Months      Speed & HCT match primary controller --         VAD Interrogation    Alarms reported by patient N      Unexpected alarms noted upon interrogation None      PI events Frequent;w/ associated speed drops  Hx back to 5/2, PI 1.9-7.0      Damage to equipment is noted N      Action taken Reviewed proper equipment care and maintenance         Driveline Exit Site    Dressing change done N      Driveline properly secured Yes  Advised on more comfortable driveline      DLES assessment c/d/i per pt report      Dressing used Daily kit      Frequency patient changes dressing Daily      Dressing modifications --      Dressing change supplier --

## 2023-05-04 LAB
ATRIAL RATE - MUSE: NORMAL BPM
DIASTOLIC BLOOD PRESSURE - MUSE: NORMAL MMHG
INTERPRETATION ECG - MUSE: NORMAL
P AXIS - MUSE: NORMAL DEGREES
PR INTERVAL - MUSE: NORMAL MS
QRS DURATION - MUSE: 100 MS
QT - MUSE: 376 MS
QTC - MUSE: 441 MS
R AXIS - MUSE: -86 DEGREES
SYSTOLIC BLOOD PRESSURE - MUSE: NORMAL MMHG
T AXIS - MUSE: 74 DEGREES
VENTRICULAR RATE- MUSE: 83 BPM

## 2023-05-05 DIAGNOSIS — Z79.899 LONG TERM USE OF DRUG: ICD-10-CM

## 2023-05-05 DIAGNOSIS — Z95.811 LEFT VENTRICULAR ASSIST DEVICE PRESENT (H): ICD-10-CM

## 2023-05-05 DIAGNOSIS — I50.22 CHRONIC SYSTOLIC CONGESTIVE HEART FAILURE (H): ICD-10-CM

## 2023-05-06 ENCOUNTER — ANCILLARY PROCEDURE (OUTPATIENT)
Dept: CARDIOLOGY | Facility: CLINIC | Age: 58
End: 2023-05-06
Attending: INTERNAL MEDICINE
Payer: COMMERCIAL

## 2023-05-06 ENCOUNTER — CARE COORDINATION (OUTPATIENT)
Dept: CARDIOLOGY | Facility: CLINIC | Age: 58
End: 2023-05-06
Payer: COMMERCIAL

## 2023-05-06 DIAGNOSIS — I42.9 CARDIOMYOPATHY (H): ICD-10-CM

## 2023-05-06 DIAGNOSIS — Z95.810 ICD (IMPLANTABLE CARDIOVERTER-DEFIBRILLATOR) IN PLACE: ICD-10-CM

## 2023-05-06 DIAGNOSIS — I50.22 CHRONIC SYSTOLIC HEART FAILURE (H): ICD-10-CM

## 2023-05-06 LAB
PLPETH BLD-MCNC: <10 NG/ML
POPETH BLD-MCNC: <10 NG/ML

## 2023-05-06 PROCEDURE — 99207 CARDIAC DEVICE CHECK - REMOTE: CPT | Performed by: INTERNAL MEDICINE

## 2023-05-07 LAB
COTININE SERPL-MCNC: <5 NG/ML
NICOTINE SERPL-MCNC: <5 NG/ML

## 2023-05-07 NOTE — PROGRESS NOTES
"S: Paged with reports of patient feeling \"thumping\" in his chest.    B: This has been happening the last few hours. Paged the device nurse, and sent in a transmission. No unusual findings. Feels like he has been drinking water consistently throughout the day. Weight has been steady at 170 lbs. Does not have a doppler to obtain MAP    A: Akshat states he's been feeling fine today, mostly bothered by the thumping, and does not have any other heart failure symptoms. He was taken off of diuretics at his appointment on 5/3, and was told that he looked dry on exam. No dizziness or light headedness.      05/06/23 2000   MCS VAD Information   Implant LVAD   LVAD Pump HeartMate 3   Heartmate 3 LEFT VS   Flow (Lpm) 5.1 Lpm   Pulse Index (PI) (!) 1.9 PI   Speed (rpm) 5200 rpm   Power (mari) 3.7 mari     R: Encouraged patient to drink a glass of water, or since he said he's tired of drinking water, told him to drink a small bottle of Gatorade. Encouraged him to page tomorrow if the feeling does not go away. Patient and wife in agreement with plan.     "

## 2023-05-08 ENCOUNTER — MYC MEDICAL ADVICE (OUTPATIENT)
Dept: INTERNAL MEDICINE | Facility: CLINIC | Age: 58
End: 2023-05-08
Payer: COMMERCIAL

## 2023-05-08 DIAGNOSIS — Z95.811 LVAD (LEFT VENTRICULAR ASSIST DEVICE) PRESENT (H): ICD-10-CM

## 2023-05-09 ENCOUNTER — OFFICE VISIT (OUTPATIENT)
Dept: CARDIOLOGY | Facility: CLINIC | Age: 58
End: 2023-05-09
Attending: INTERNAL MEDICINE
Payer: COMMERCIAL

## 2023-05-09 ENCOUNTER — ANTICOAGULATION THERAPY VISIT (OUTPATIENT)
Dept: ANTICOAGULATION | Facility: CLINIC | Age: 58
End: 2023-05-09

## 2023-05-09 ENCOUNTER — CARE COORDINATION (OUTPATIENT)
Dept: CARDIOLOGY | Facility: CLINIC | Age: 58
End: 2023-05-09

## 2023-05-09 ENCOUNTER — LAB (OUTPATIENT)
Dept: LAB | Facility: CLINIC | Age: 58
End: 2023-05-09
Payer: COMMERCIAL

## 2023-05-09 VITALS
OXYGEN SATURATION: 94 % | WEIGHT: 175.9 LBS | DIASTOLIC BLOOD PRESSURE: 82 MMHG | HEART RATE: 74 BPM | SYSTOLIC BLOOD PRESSURE: 123 MMHG | BODY MASS INDEX: 27.9 KG/M2

## 2023-05-09 DIAGNOSIS — I50.23 ACUTE ON CHRONIC SYSTOLIC CONGESTIVE HEART FAILURE (H): Primary | ICD-10-CM

## 2023-05-09 DIAGNOSIS — I50.20 HEART FAILURE WITH REDUCED EJECTION FRACTION, NYHA CLASS III (H): ICD-10-CM

## 2023-05-09 DIAGNOSIS — E78.2 MIXED HYPERLIPIDEMIA: ICD-10-CM

## 2023-05-09 DIAGNOSIS — I42.8 NONISCHEMIC CARDIOMYOPATHY (H): ICD-10-CM

## 2023-05-09 DIAGNOSIS — Z79.01 ANTICOAGULATED ON COUMADIN: ICD-10-CM

## 2023-05-09 DIAGNOSIS — Z95.811 LEFT VENTRICULAR ASSIST DEVICE PRESENT (H): ICD-10-CM

## 2023-05-09 DIAGNOSIS — Z95.811 LVAD (LEFT VENTRICULAR ASSIST DEVICE) PRESENT (H): ICD-10-CM

## 2023-05-09 DIAGNOSIS — Z79.899 LONG TERM USE OF DRUG: ICD-10-CM

## 2023-05-09 DIAGNOSIS — I50.22 CHRONIC SYSTOLIC CONGESTIVE HEART FAILURE (H): ICD-10-CM

## 2023-05-09 DIAGNOSIS — I50.22 CHRONIC SYSTOLIC (CONGESTIVE) HEART FAILURE (H): Primary | ICD-10-CM

## 2023-05-09 DIAGNOSIS — I10 BENIGN ESSENTIAL HYPERTENSION: ICD-10-CM

## 2023-05-09 DIAGNOSIS — I50.22 CHRONIC SYSTOLIC (CONGESTIVE) HEART FAILURE (H): ICD-10-CM

## 2023-05-09 LAB
ALBUMIN SERPL BCG-MCNC: 4.2 G/DL (ref 3.5–5.2)
ALP SERPL-CCNC: 139 U/L (ref 40–129)
ALT SERPL W P-5'-P-CCNC: 23 U/L (ref 10–50)
AMPHETAMINES UR QL SCN: NORMAL
ANION GAP SERPL CALCULATED.3IONS-SCNC: 7 MMOL/L (ref 7–15)
AST SERPL W P-5'-P-CCNC: 33 U/L (ref 10–50)
BARBITURATES UR QL SCN: NORMAL
BASOPHILS # BLD AUTO: 0.1 10E3/UL (ref 0–0.2)
BASOPHILS NFR BLD AUTO: 1 %
BENZODIAZ UR QL SCN: NORMAL
BILIRUB SERPL-MCNC: 0.5 MG/DL
BUN SERPL-MCNC: 23 MG/DL (ref 6–20)
BZE UR QL SCN: NORMAL
CALCIUM SERPL-MCNC: 9.5 MG/DL (ref 8.6–10)
CANNABINOIDS UR QL SCN: NORMAL
CHLORIDE SERPL-SCNC: 97 MMOL/L (ref 98–107)
CREAT SERPL-MCNC: 1.35 MG/DL (ref 0.67–1.17)
DEPRECATED HCO3 PLAS-SCNC: 31 MMOL/L (ref 22–29)
EOSINOPHIL # BLD AUTO: 0.7 10E3/UL (ref 0–0.7)
EOSINOPHIL NFR BLD AUTO: 5 %
ERYTHROCYTE [DISTWIDTH] IN BLOOD BY AUTOMATED COUNT: 15.5 % (ref 10–15)
ETHANOL UR QL SCN: NORMAL
GFR SERPL CREATININE-BSD FRML MDRD: 62 ML/MIN/1.73M2
GLUCOSE SERPL-MCNC: 150 MG/DL (ref 70–99)
HCT VFR BLD AUTO: 40.1 % (ref 40–53)
HGB BLD-MCNC: 12.4 G/DL (ref 13.3–17.7)
IMM GRANULOCYTES # BLD: 0.2 10E3/UL
IMM GRANULOCYTES NFR BLD: 1 %
INR PPP: 3.22 (ref 0.85–1.15)
LDH SERPL L TO P-CCNC: 511 U/L (ref 0–250)
LYMPHOCYTES # BLD AUTO: 2.5 10E3/UL (ref 0.8–5.3)
LYMPHOCYTES NFR BLD AUTO: 17 %
MCH RBC QN AUTO: 28.8 PG (ref 26.5–33)
MCHC RBC AUTO-ENTMCNC: 30.9 G/DL (ref 31.5–36.5)
MCV RBC AUTO: 93 FL (ref 78–100)
MONOCYTES # BLD AUTO: 1.2 10E3/UL (ref 0–1.3)
MONOCYTES NFR BLD AUTO: 8 %
NEUTROPHILS # BLD AUTO: 10.5 10E3/UL (ref 1.6–8.3)
NEUTROPHILS NFR BLD AUTO: 68 %
NRBC # BLD AUTO: 0 10E3/UL
NRBC BLD AUTO-RTO: 0 /100
OPIATES UR QL SCN: NORMAL
PCP QUAL URINE (ROCHE): NORMAL
PLATELET # BLD AUTO: 306 10E3/UL (ref 150–450)
POTASSIUM SERPL-SCNC: 4.4 MMOL/L (ref 3.4–5.3)
PROT SERPL-MCNC: 7.1 G/DL (ref 6.4–8.3)
RBC # BLD AUTO: 4.31 10E6/UL (ref 4.4–5.9)
SODIUM SERPL-SCNC: 135 MMOL/L (ref 136–145)
WBC # BLD AUTO: 15.2 10E3/UL (ref 4–11)

## 2023-05-09 PROCEDURE — 85025 COMPLETE CBC W/AUTO DIFF WBC: CPT | Performed by: PATHOLOGY

## 2023-05-09 PROCEDURE — 93750 INTERROGATION VAD IN PERSON: CPT | Performed by: INTERNAL MEDICINE

## 2023-05-09 PROCEDURE — G0463 HOSPITAL OUTPT CLINIC VISIT: HCPCS | Mod: 25 | Performed by: INTERNAL MEDICINE

## 2023-05-09 PROCEDURE — 99000 SPECIMEN HANDLING OFFICE-LAB: CPT | Performed by: PATHOLOGY

## 2023-05-09 PROCEDURE — 81001 URINALYSIS AUTO W/SCOPE: CPT | Performed by: INTERNAL MEDICINE

## 2023-05-09 PROCEDURE — 80053 COMPREHEN METABOLIC PANEL: CPT | Performed by: PATHOLOGY

## 2023-05-09 PROCEDURE — 99215 OFFICE O/P EST HI 40 MIN: CPT | Performed by: INTERNAL MEDICINE

## 2023-05-09 PROCEDURE — 80307 DRUG TEST PRSMV CHEM ANLYZR: CPT | Mod: 90 | Performed by: PATHOLOGY

## 2023-05-09 PROCEDURE — 36415 COLL VENOUS BLD VENIPUNCTURE: CPT | Performed by: PATHOLOGY

## 2023-05-09 PROCEDURE — 93246 EXT ECG>7D<15D RECORDING: CPT

## 2023-05-09 PROCEDURE — 85610 PROTHROMBIN TIME: CPT | Performed by: PATHOLOGY

## 2023-05-09 PROCEDURE — 93248 EXT ECG>7D<15D REV&INTERPJ: CPT | Performed by: INTERNAL MEDICINE

## 2023-05-09 PROCEDURE — 83615 LACTATE (LD) (LDH) ENZYME: CPT | Performed by: PATHOLOGY

## 2023-05-09 RX ORDER — LISINOPRIL 5 MG/1
5 TABLET ORAL DAILY
Qty: 90 TABLET | Refills: 3 | Status: SHIPPED | OUTPATIENT
Start: 2023-05-09 | End: 2023-12-23

## 2023-05-09 RX ORDER — PANTOPRAZOLE SODIUM 20 MG/1
20 TABLET, DELAYED RELEASE ORAL
Qty: 30 TABLET | Refills: 0 | Status: SHIPPED | OUTPATIENT
Start: 2023-05-09 | End: 2023-05-30

## 2023-05-09 ASSESSMENT — PAIN SCALES - GENERAL: PAINLEVEL: NO PAIN (0)

## 2023-05-09 NOTE — PROGRESS NOTES
May 9, 2023     Akshat Fragoso is a 58 year old male with history of HFrEF 2/2 NICM (diagnosed in 1/2017) s/p ICD placement in 6/2017, VT/VF arrest on 3/15/2023 requiring CPR for 6 mins with cardiogenic shock s/p HM3 LVAD implantation as DT 3/23/2023 c/b postop AF (on amiodarone and digoxin) and HAP, moderate TR s/p TV annuloplasty with 32 mm MC3 partial ring 3/23/2023, PFO closure 3/23/2023, chronic tobacco/marijuana use, COPD, HTN, CKD stage III, who presents today for post LVAD implant follow up,     He was admitted on 3/15/2023 for cardiac arrest in the setting of VT that was slower than his programmed VT threshold for intervention. Patient had been undergoing workup at [facility] for LVAD as destination therapy (initially evaluated for transplant though patient having trouble quitting marijuana/tobacco use). During hospitalization, he extensive pre-operative work-up and on multi-disciplinary discussion, was determined to be an appropriate candidate for placement of the HM3 LVAD. He underwent HM3 LVAD implantation on 3/23/2023 with postop course c/b AF with RVR requiring amiodarone gtt, volume overload, and HAP treated with IV vancomycin and zosyn for 5 days. He was discharged to home on 4/9/2023.     Overall he has been doing well however he did have a few episodes of palpitations it seems like these were associated to low PIs which were down to 1.8 at the time.  He already stopped the diuretics and he felt little bit dizzy lightheaded and a little bit out of it as described by family.  There were no other associated symptoms no LVAD alarms.  No dizziness lightheadedness or palpitations other than described above.  His exercise capacity overall is improving.  No blood in the urine.  Takes all medications as prescribed.    LVAD interrogation:  LVAD was interrogated today at bedside.  Speed was initially set at 5200 RPM, PI was 2.8 and power was 3.8.  During the visit LVAD was reprogrammed to a speed of 5100 and the  lower speed limit of 4900 RPM.     Current cardiac medications  - Lisinopril 10 mg daily  - Spironolactone 12.5 mg daily  - Empagliflozin 10 mg daily  - Bumex 1 mg daily  - Digoxin 250 mcg daily  - Amiodarone 200 mg daily  - ASA 81 mg daily  - Warfarin (INR goal 2-3)      PAST MEDICAL HISTORY:  Past Medical History:   Diagnosis Date     Acute right lumbar radiculopathy 11/02/2015     Acute systolic heart failure (H) 01/10/2017     Cardiomyopathy (H) 01/10/2017     CKD (chronic kidney disease) stage 3, GFR 30-59 ml/min (H) 01/30/2020     JOHNSON (dyspnea on exertion)      ED (erectile dysfunction) 10/02/2019     Erectile dysfunction      ICD (implantable cardioverter-defibrillator) in place- [email or web address], single chamber- NOT dependent 10/09/2017     Personal history of smoking 01/04/2017     Pulmonary nodules 01/17/2017    CT 11/2018:  Bilateral pulmonary nodules measuring up to 4 mm. No new or enlarging pulmonary nodules.     FAMILY HISTORY:  Family History   Problem Relation Age of Onset     Parkinsonism Mother      Atrial fibrillation Father      Heart Failure Father      Prostate Cancer Father      Skin Cancer Father      Anorexia nervosa Daughter      Other - See Comments Granddaughter         premature birth     SOCIAL HISTORY:  Social History     Socioeconomic History     Marital status:      Spouse name: Cheryl     Number of children: 2   Occupational History     Occupation: Construction      Employer: SELF   Tobacco Use     Smoking status: Former     Packs/day: 0.25     Years: 15.00     Pack years: 3.75     Types: Cigarettes     Smokeless tobacco: Former     Quit date: 3/15/2023   Vaping Use     Vaping status: Never Used   Substance and Sexual Activity     Alcohol use: No     Alcohol/week: 0.0 standard drinks of alcohol     Drug use: No     Sexual activity: Yes     Partners: Female     Birth control/protection: Condom   Other Topics Concern     Parent/sibling w/ CABG, MI or angioplasty before 65F  55M? Yes     Comment: both parents had stints placed      CURRENT MEDICATIONS:  Current Outpatient Medications   Medication     acetaminophen (TYLENOL) 325 MG tablet     amiodarone (PACERONE) 200 MG tablet     amoxicillin (AMOXIL) 500 MG tablet     aspirin (ASA) 81 MG chewable tablet     bumetanide (BUMEX) 1 MG tablet     digoxin (LANOXIN) 250 MCG tablet     empagliflozin (JARDIANCE) 10 MG TABS tablet     gabapentin (NEURONTIN) 300 MG capsule     lisinopril (ZESTRIL) 10 MG tablet     methocarbamol (ROBAXIN) 500 MG tablet     nystatin (MYCOSTATIN) 078439 UNIT/ML suspension     oxyCODONE (ROXICODONE) 5 MG tablet     pantoprazole (PROTONIX) 40 MG EC tablet     polyethylene glycol (MIRALAX) 17 GM/Dose powder     senna-docusate (SENOKOT-S/PERICOLACE) 8.6-50 MG tablet     spironolactone (ALDACTONE) 25 MG tablet     warfarin ANTICOAGULANT (COUMADIN) 5 MG tablet     No current facility-administered medications for this visit.     ROS:   Constitutional: No fever, chills, or sweats. Weight is 0 lbs 0 oz  ENT: No visual disturbance, ear ache, epistaxis, sore throat.   Allergies/Immunologic: Negative.   Respiratory: No cough, hemoptysis.   Cardiovascular: As per HPI.   GI: No nausea, vomiting, hematemesis, melena, or hematochezia.   : No urinary frequency, dysuria, or hematuria.   Integument: Negative.   Psychiatric: Pleasant, no major depression noted  Neuro: No focal neurological deficits noted  Endocrinology: Negative.   Musculoskeletal: As per HPI.      EXAM:  /82 (BP Location: Right arm, Patient Position: Chair, Cuff Size: Adult Regular)   Pulse 74   Wt 79.8 kg (175 lb 14.4 oz)   SpO2 94%   BMI 27.90 kg/m  .  The actual MAP was 60 mmHg  General Appearance: AOx3, no clubbing/cyanosis, no edema, no JVD  HEENT: PERRL, EOMI, no pharyngeal erythema  Respiratory: CTAB, no wheezing, no crackles  Cardiovascular: LVAD hum  GI: no distension, normoactive bowel sounds, soft, not tender, no rebound tenderness or guarding,  no hepatosplenomegaly  Genitourinary: no CVA tenderness  Skin: no rash  Musculoskeletal: no deformities, no joint swelling, no pitting edema bilaterally   Neurologic: CN grossly intact, no focal neurological deficits, no asterixis      Labs:  Lab Results   Component Value Date    WBC 14.4 (H) 05/03/2023    HGB 11.9 (L) 05/03/2023    HCT 39.5 (L) 05/03/2023     05/03/2023     (L) 05/03/2023    POTASSIUM 4.8 05/03/2023    CHLORIDE 95 (L) 05/03/2023    CO2 28 05/03/2023    BUN 30.1 (H) 05/03/2023    CR 1.54 (H) 05/03/2023     (H) 05/03/2023    DD 3.60 (H) 04/18/2023    NTBNPI 31,087 (H) 03/20/2023    NTBNP 7,719 (H) 04/18/2023    TROPI 0.033 01/09/2017    AST 32 05/03/2023    ALT 26 05/03/2023    ALKPHOS 129 05/03/2023    BILITOTAL 0.5 05/03/2023    INR 3.32 (H) 05/03/2023     Labs:  Reviewed.  LDH slightly elevated lipase above 500.  White cell count 15,000 creatinine improved a little bit to 1.35     Testing/Procedures:  ICD check 4/12/2023  Device: [employer] D150 DYNAGEN  Normal device function  Mode: VVI 40 bpm  : 0%  Intrinsic rhythm: VS @ 91 bpm  R waves measured 2 mV today which is not a new finding since LVAD implant on 3/23/23. RV threshold measured 1.5 V @ 0.4 ms  Estimated battery longevity to RRT = 10.5 years  Anticoagulant: warfarin  Ventricular Arrhythmia: 1 episode recorded in the VT monitor zon greg 3/28/23 @ 1303 - 14 sec, 175 bpm  Setting Changes: None     TTE 3/28/2023  Technically difficult study.Extremely poor acoustic windows.  Limited information was obtained during study.  LVAD cannula was seen in the expected anatomic position in the LV apex.  HM3 at 5200RPM.  Septum normal.  LVIDd 56mm.  Aortic valve not well visualized. No AI.  Normal outflow velocity.  Global right ventricular function is moderately to severely reduced.  Small pericardial effusion with organizing material in pericardial cavity.     TTE 5/3/23:  Please refer to the EPIC report for measurements  performed at different LVAD speed settings.  HM3 at 5200RPM at baseline.  LVIDd 43mm.  Septum normal.  Aortic valve remain closed at baseline.    Assessment and Plan:   Akshat Fragoso is a 58 year old male with history of HFrEF 2/2 NICM (diagnosed in 1/2017) s/p ICD placement in 6/2017, VT/VF arrest on 3/15/2023 requiring CPR for 6 mins with cardiogenic shock s/p HM3 LVAD implantation as DT 3/23/2023 c/b postop AF (on amiodarone and digoxin) and HAP, moderate TR s/p TV annuloplasty with 32 mm MC3 partial ring 3/23/2023, PFO closure 3/23/2023, chronic tobacco/marijuana use, COPD, HTN, CKD stage III, who presents today for post LVAD implant follow up.  Overall he has been doing well from the cardiac standpoint and recovering well after LVAD implantation.  He did have recent LVAD ramp study that was reviewed in detail.  He did have a few low PI events and he did not feel very well over the past couple of days however improved by today.  He thinks blood pressure was a little bit low and here.  Dry.  He drank some water which led to some improvement and blood pressure improved.  However we will do the following changes today.  - We will decrease the speed to 5100 RPM after complaints by patient with regards to the PI events as well as potential suction events.  LVAD ramp study reviewed in detail  - We will decrease lisinopril to 5 mg once a day  - We will repeat LDH in a week or sooner as needed as I am little concerned why the LDH is elevated  - Discussed to watch out for urine discoloration and we will actually get a UA today  - Given the palpitations we will place a Zio patch monitor for 2 weeks.  The it is noted that he does have an ICD in place however if he has ventricular tachycardia it might not be long enough for the device to be detected and also it might be below the lower rate limit for the monitoring zone and as such it might be missed    Chronic systolic heart failure secondary to NICM with cardiogenic shock  s/p HM III LVAD. ACC/AHA Stage D, NYHA Class IIIB  Moderate TR s/p TV annuloplasty with 32 mm MC3 partial ring 3/23/2023.  Has a history of NICM diagnosed in 1/2017 and underwent ICD for primary SCD prophylaxis in 4/2017. Patient had been undergoing workup at [facility] for LVAD as destination therapy (initially evaluated for transplant though patient having trouble quitting marijuana/tobacco use). He had cardiac arrest in the setting of VT that was slower than his programmed VT threshold for intervention on 3/15/2023. Received CPR for 6 mins with ROSC and developed cardiogenic shock. During hospitalization, he underwent HM3 LVAD implantation on 3/23/2023 with postop course c/b AF with RVR requiring amiodarone gtt, volume overload, and HAP treated with IV vancomycin and zosyn for 5 days. He was discharged to home on 4/9/2023.      Persistent lukocytosis.   ID noted no indication for long term antibiotics inpatient given no acute findings on CT. Repeat CT Chest/Abd/Pelvis on 4/18/2023 for persistent leukocytosis with stable findings. CRP trended down. WBC increased today to 17 from 14 earlier. No acute findings aside from pain.     Afib with RVR, resolved.   - Continue Amiodarone and Digoxin.  - Warfarin with INR goal 2-3     HTN.   - MAP stable as above cutting back lisinopril     I appreciate the opportunity to participate in the care of Akshat Fragoso . Please do not hesitate to contact me with any further questions.    Sincerely,   Shaun Aguiar MD  [facility] Division of Cardiology

## 2023-05-09 NOTE — PATIENT INSTRUCTIONS
Instructions:  1.Your driveline exit site was found to have a good seal and meets criteria for transfer to a weekly dressing. Your caregiver was trained on performing a weekly dressing and you have been sent home with a weekly dressing kit.   2. Please bring your shower bag to your next clinic appointment so that you can be trained on how to use it. Do not shower. Do not start showering until a VAD coordinator provides approval.     Follow up:   1. Please follow-up with Dr. Aguiar on 5/16/23 with labs priors, as scheduled .    2. Please follow up with a VAD Coordinator on 5/16/23 immediately following your appointment with Dr. Aguiar, for education.   3. Please follow-up with VAD RABIA on 6/7/23 with labs priors, as scheduled .

## 2023-05-09 NOTE — LETTER
5/9/2023      RE: Akshat Fragoso  [address]  [place] [address]       Dear Colleague,    Thank you for the opportunity to participate in the care of your patient, Akshat Fragoso, at the [facility]. Please see a copy of my visit note below.    May 9, 2023     Akshat Fragoso is a 58 year old male with history of HFrEF 2/2 NICM (diagnosed in 1/2017) s/p ICD placement in 6/2017, VT/VF arrest on 3/15/2023 requiring CPR for 6 mins with cardiogenic shock s/p HM3 LVAD implantation as DT 3/23/2023 c/b postop AF (on amiodarone and digoxin) and HAP, moderate TR s/p TV annuloplasty with 32 mm MC3 partial ring 3/23/2023, PFO closure 3/23/2023, chronic tobacco/marijuana use, COPD, HTN, CKD stage III, who presents today for post LVAD implant follow up,     He was admitted on 3/15/2023 for cardiac arrest in the setting of VT that was slower than his programmed VT threshold for intervention. Patient had been undergoing workup at [facility] for LVAD as destination therapy (initially evaluated for transplant though patient having trouble quitting marijuana/tobacco use). During hospitalization, he extensive pre-operative work-up and on multi-disciplinary discussion, was determined to be an appropriate candidate for placement of the HM3 LVAD. He underwent HM3 LVAD implantation on 3/23/2023 with postop course c/b AF with RVR requiring amiodarone gtt, volume overload, and HAP treated with IV vancomycin and zosyn for 5 days. He was discharged to home on 4/9/2023.     Overall he has been doing well however he did have a few episodes of palpitations it seems like these were associated to low PIs which were down to 1.8 at the time.  He already stopped the diuretics and he felt little bit dizzy lightheaded and a little bit out of it as described by family.  There were no other associated symptoms no LVAD alarms.  No dizziness lightheadedness or  palpitations other than described above.  His exercise capacity overall is improving.  No blood in the urine.  Takes all medications as prescribed.    LVAD interrogation:  LVAD was interrogated today at bedside.  Speed was initially set at 5200 RPM, PI was 2.8 and power was 3.8.  During the visit LVAD was reprogrammed to a speed of 5100 and the lower speed limit of 4900 RPM.     Current cardiac medications  - Lisinopril 10 mg daily  - Spironolactone 12.5 mg daily  - Empagliflozin 10 mg daily  - Bumex 1 mg daily  - Digoxin 250 mcg daily  - Amiodarone 200 mg daily  - ASA 81 mg daily  - Warfarin (INR goal 2-3)      PAST MEDICAL HISTORY:  Past Medical History:   Diagnosis Date    Acute right lumbar radiculopathy 11/02/2015    Acute systolic heart failure (H) 01/10/2017    Cardiomyopathy (H) 01/10/2017    CKD (chronic kidney disease) stage 3, GFR 30-59 ml/min (H) 01/30/2020    JOHNSON (dyspnea on exertion)     ED (erectile dysfunction) 10/02/2019    Erectile dysfunction     ICD (implantable cardioverter-defibrillator) in place- [employer], single chamber- NOT dependent 10/09/2017    Personal history of smoking 01/04/2017    Pulmonary nodules 01/17/2017    CT 11/2018:  Bilateral pulmonary nodules measuring up to 4 mm. No new or enlarging pulmonary nodules.     FAMILY HISTORY:  Family History   Problem Relation Age of Onset    Parkinsonism Mother     Atrial fibrillation Father     Heart Failure Father     Prostate Cancer Father     Skin Cancer Father     Anorexia nervosa Daughter     Other - See Comments Granddaughter         premature birth     SOCIAL HISTORY:  Social History     Socioeconomic History    Marital status:      Spouse name: Cheryl    Number of children: 2   Occupational History    Occupation: Construction      Employer: SELF   Tobacco Use    Smoking status: Former     Packs/day: 0.25     Years: 15.00     Pack years: 3.75     Types: Cigarettes    Smokeless tobacco: Former     Quit date: 3/15/2023    Vaping Use    Vaping status: Never Used   Substance and Sexual Activity    Alcohol use: No     Alcohol/week: 0.0 standard drinks of alcohol    Drug use: No    Sexual activity: Yes     Partners: Female     Birth control/protection: Condom   Other Topics Concern    Parent/sibling w/ CABG, MI or angioplasty before 65F 55M? Yes     Comment: both parents had stints placed      CURRENT MEDICATIONS:  Current Outpatient Medications   Medication    acetaminophen (TYLENOL) 325 MG tablet    amiodarone (PACERONE) 200 MG tablet    amoxicillin (AMOXIL) 500 MG tablet    aspirin (ASA) 81 MG chewable tablet    bumetanide (BUMEX) 1 MG tablet    digoxin (LANOXIN) 250 MCG tablet    empagliflozin (JARDIANCE) 10 MG TABS tablet    gabapentin (NEURONTIN) 300 MG capsule    lisinopril (ZESTRIL) 10 MG tablet    methocarbamol (ROBAXIN) 500 MG tablet    nystatin (MYCOSTATIN) 237878 UNIT/ML suspension    oxyCODONE (ROXICODONE) 5 MG tablet    pantoprazole (PROTONIX) 40 MG EC tablet    polyethylene glycol (MIRALAX) 17 GM/Dose powder    senna-docusate (SENOKOT-S/PERICOLACE) 8.6-50 MG tablet    spironolactone (ALDACTONE) 25 MG tablet    warfarin ANTICOAGULANT (COUMADIN) 5 MG tablet     No current facility-administered medications for this visit.     ROS:   Constitutional: No fever, chills, or sweats. Weight is 0 lbs 0 oz  ENT: No visual disturbance, ear ache, epistaxis, sore throat.   Allergies/Immunologic: Negative.   Respiratory: No cough, hemoptysis.   Cardiovascular: As per HPI.   GI: No nausea, vomiting, hematemesis, melena, or hematochezia.   : No urinary frequency, dysuria, or hematuria.   Integument: Negative.   Psychiatric: Pleasant, no major depression noted  Neuro: No focal neurological deficits noted  Endocrinology: Negative.   Musculoskeletal: As per HPI.      EXAM:  /82 (BP Location: Right arm, Patient Position: Chair, Cuff Size: Adult Regular)   Pulse 74   Wt 79.8 kg (175 lb 14.4 oz)   SpO2 94%   BMI 27.90 kg/m  .  The  actual MAP was 60 mmHg  General Appearance: AOx3, no clubbing/cyanosis, no edema, no JVD  HEENT: PERRL, EOMI, no pharyngeal erythema  Respiratory: CTAB, no wheezing, no crackles  Cardiovascular: LVAD hum  GI: no distension, normoactive bowel sounds, soft, not tender, no rebound tenderness or guarding, no hepatosplenomegaly  Genitourinary: no CVA tenderness  Skin: no rash  Musculoskeletal: no deformities, no joint swelling, no pitting edema bilaterally   Neurologic: CN grossly intact, no focal neurological deficits, no asterixis      Labs:  Lab Results   Component Value Date    WBC 14.4 (H) 05/03/2023    HGB 11.9 (L) 05/03/2023    HCT 39.5 (L) 05/03/2023     05/03/2023     (L) 05/03/2023    POTASSIUM 4.8 05/03/2023    CHLORIDE 95 (L) 05/03/2023    CO2 28 05/03/2023    BUN 30.1 (H) 05/03/2023    CR 1.54 (H) 05/03/2023     (H) 05/03/2023    DD 3.60 (H) 04/18/2023    NTBNPI 31,087 (H) 03/20/2023    NTBNP 7,719 (H) 04/18/2023    TROPI 0.033 01/09/2017    AST 32 05/03/2023    ALT 26 05/03/2023    ALKPHOS 129 05/03/2023    BILITOTAL 0.5 05/03/2023    INR 3.32 (H) 05/03/2023     Labs:  Reviewed.  LDH slightly elevated lipase above 500.  White cell count 15,000 creatinine improved a little bit to 1.35     Testing/Procedures:  ICD check 4/12/2023  Device: [employer] D150 DYNAGEN  Normal device function  Mode: VVI 40 bpm  : 0%  Intrinsic rhythm: VS @ 91 bpm  R waves measured 2 mV today which is not a new finding since LVAD implant on 3/23/23. RV threshold measured 1.5 V @ 0.4 ms  Estimated battery longevity to RRT = 10.5 years  Anticoagulant: warfarin  Ventricular Arrhythmia: 1 episode recorded in the VT monitor zon greg 3/28/23 @ 1303 - 14 sec, 175 bpm  Setting Changes: None     TTE 3/28/2023  Technically difficult study.Extremely poor acoustic windows.  Limited information was obtained during study.  LVAD cannula was seen in the expected anatomic position in the LV apex.  HM3 at 5200RPM.  Septum  normal.  LVIDd 56mm.  Aortic valve not well visualized. No AI.  Normal outflow velocity.  Global right ventricular function is moderately to severely reduced.  Small pericardial effusion with organizing material in pericardial cavity.     TTE 5/3/23:  Please refer to the EPIC report for measurements performed at different LVAD speed settings.  HM3 at 5200RPM at baseline.  LVIDd 43mm.  Septum normal.  Aortic valve remain closed at baseline.    Assessment and Plan:   Akshat Fragoso is a 58 year old male with history of HFrEF 2/2 NICM (diagnosed in 1/2017) s/p ICD placement in 6/2017, VT/VF arrest on 3/15/2023 requiring CPR for 6 mins with cardiogenic shock s/p HM3 LVAD implantation as DT 3/23/2023 c/b postop AF (on amiodarone and digoxin) and HAP, moderate TR s/p TV annuloplasty with 32 mm MC3 partial ring 3/23/2023, PFO closure 3/23/2023, chronic tobacco/marijuana use, COPD, HTN, CKD stage III, who presents today for post LVAD implant follow up.  Overall he has been doing well from the cardiac standpoint and recovering well after LVAD implantation.  He did have recent LVAD ramp study that was reviewed in detail.  He did have a few low PI events and he did not feel very well over the past couple of days however improved by today.  He thinks blood pressure was a little bit low and here.  Dry.  He drank some water which led to some improvement and blood pressure improved.  However we will do the following changes today.  - We will decrease the speed to 5100 RPM after complaints by patient with regards to the PI events as well as potential suction events.  LVAD ramp study reviewed in detail  - We will decrease lisinopril to 5 mg once a day  - We will repeat LDH in a week or sooner as needed as I am little concerned why the LDH is elevated  - Discussed to watch out for urine discoloration and we will actually get a UA today  - Given the palpitations we will place a Zio patch monitor for 2 weeks.  The it is noted that he  does have an ICD in place however if he has ventricular tachycardia it might not be long enough for the device to be detected and also it might be below the lower rate limit for the monitoring zone and as such it might be missed    Chronic systolic heart failure secondary to NICM with cardiogenic shock s/p HM III LVAD. ACC/AHA Stage D, NYHA Class IIIB  Moderate TR s/p TV annuloplasty with 32 mm MC3 partial ring 3/23/2023.  Has a history of NICM diagnosed in 1/2017 and underwent ICD for primary SCD prophylaxis in 4/2017. Patient had been undergoing workup at [place] for LVAD as destination therapy (initially evaluated for transplant though patient having trouble quitting marijuana/tobacco use). He had cardiac arrest in the setting of VT that was slower than his programmed VT threshold for intervention on 3/15/2023. Received CPR for 6 mins with ROSC and developed cardiogenic shock. During hospitalization, he underwent HM3 LVAD implantation on 3/23/2023 with postop course c/b AF with RVR requiring amiodarone gtt, volume overload, and HAP treated with IV vancomycin and zosyn for 5 days. He was discharged to home on 4/9/2023.      Persistent lukocytosis.   ID noted no indication for long term antibiotics inpatient given no acute findings on CT. Repeat CT Chest/Abd/Pelvis on 4/18/2023 for persistent leukocytosis with stable findings. CRP trended down. WBC increased today to 17 from 14 earlier. No acute findings aside from pain.     Afib with RVR, resolved.   - Continue Amiodarone and Digoxin.  - Warfarin with INR goal 2-3     HTN.   - MAP stable as above cutting back lisinopril     I appreciate the opportunity to participate in the care of Akshat Fragoso . Please do not hesitate to contact me with any further questions.    Sincerely,   Shaun Aguiar MD  [facility] Division of Cardiology

## 2023-05-09 NOTE — NURSING NOTE
Chief Complaint   Patient presents with     Follow Up     Return VAD     Vitals were taken and medications reconciled.    Lex Babb, EMT  2:57 PM

## 2023-05-09 NOTE — NURSING NOTE
The patient's driveline exit site was found to have a good seal and meets criteria for transfer to a weekly dressing. Please see picture below.      Patient and wife presented to clinic for education on LVAD drive line exit site weekly dressing change.     Provided instructions on performing the weekly dressing and demonstrated on patient.     Instructed patient that the weekly dressing can remain in place for up to 1 week.  o The weekly dressing must be changed if there is an accumulation of drainage, vad coordinator should be notified.  o The weekly dressing must be changed if there is moisture under the dressing.   o The weekly dressing needs to be changed earlier if the dressing has come loose.    No shower training performed this visit. Pt's caregiver stated she was feeling overwhelmed and preferred to receive shower training next week. They will bring their shower kit then in order to receive shower training. RN education appointment scheduled.

## 2023-05-09 NOTE — PROGRESS NOTES
ANTICOAGULATION MANAGEMENT     Akshat Fragoso 58 year old male is on warfarin with supratherapeutic INR result. (Goal INR 2.0-3.0)    Recent labs: (last 7 days)     05/09/23  1435   INR 3.22*       ASSESSMENT       Source(s): Chart Review       Warfarin doses taken: Reviewed in chart    Diet: No new diet changes identified    Medication/supplement changes: None noted    New illness, injury, or hospitalization: No    Signs or symptoms of bleeding or clotting: No    Previous result: Supratherapeutic    Additional findings: None         PLAN     Recommended plan for no diet, medication or health factor changes affecting INR     Dosing Instructions: decrease your warfarin dose (7.7% change) with next INR in 1 week       Summary  As of 5/9/2023    Full warfarin instructions:  2.5 mg every Tue, Fri; 5 mg all other days   Next INR check:  5/16/2023             Detailed voice message left for Akshat with dosing instructions and follow up date.     Contact [phone] to schedule and with any changes, questions or concerns.     Education provided:     Please call back if any changes to your diet, medications or how you've been taking warfarin    Contact [phone] with any changes, questions or concerns.     Plan made per [facility] anticoagulation protocol and per LVAD protocol    Merry Reyes RN  Anticoagulation Clinic  5/9/2023    _______________________________________________________________________     Anticoagulation Episode Summary     Current INR goal:  2.0-3.0   TTR:  14.1 % (3.1 wk)   Target end date:  Indefinite   Send INR reminders to:  [facility]    Indications    Acute on chronic systolic congestive heart failure (H) [I50.23]  LVAD (left ventricular assist device) present (H) [Z95.811]  Chronic systolic congestive heart failure (H) [I50.22]  Long term use of drug [Z79.899]  Left ventricular assist device present (H) [Z95.811]  Anticoagulated on Coumadin [Z79.01]           Comments:  Follow VAD [facility]  protocol:Yes: HeartMate 3   Bridging: Enoxaparin   Date VAD placed: 3/23/23         Anticoagulation Care Providers     Provider Role Specialty Phone number    Kenzie Moreau MD Referring Advanced Heart Failure and Transplant Cardiology [phone]    Mile Bolivar, APRN CNP Referring Nurse Practitioner [phone]    Shaun Aguiar MD Referring Cardiovascular Disease [phone]

## 2023-05-09 NOTE — PROGRESS NOTES
Per Dr. Aguiar, patient to have 4day zio monitor placed.  Diagnosis: Chronic systolic HF, LVAD  Monitor placed: Yes  Patient Instructed: Yes  Patient verbalized understanding: Yes  Holter # [id number]    Monitor was placed by Remy Wisdom, ELYSSA   4:34 PM

## 2023-05-09 NOTE — PROGRESS NOTES
Called patient to inform him that we need him to give a urine sample along with his regular lab work today in order to complete his surveillance testing for this month. Unable to reach patient but left voicemail with instructions and provided a call back number for any questions or concerns.

## 2023-05-09 NOTE — TELEPHONE ENCOUNTER
pantoprazole (PROTONIX) 40 MG EC tablet  Last Written Prescription Date:   4/10/2023  Last Fill Quantity: 30,   # refills: 0  Last Office Visit :  5/1/2023  Future Office visit:  None    Routing refill request to provider for review/approval because:  Last filled by [facility].  Please send new order with updated providers signature for Pt care.   Thank you       Camille Lafleur RN  Central Triage Red Flags/Med Refills

## 2023-05-09 NOTE — NURSING NOTE
[facility] VAD Pump Info     Row Name 05/09/23 1545 05/09/23 1530          [facility] VAD Information    Implant LVAD LVAD     LVAD Pump HeartMate 3 HeartMate 3        Heartmate 3 LEFT VS    Flow (Lpm) 4.7 Lpm 5 Lpm     Pulse Index (PI) 2.6 PI 2.4 PI     Speed (rpm) 5100 rpm  speed dropped to 5100 by Dr. Aguiar 5200 rpm     Power (mari) 3.6 mari 3.8 mari     Current Hct setting 40 40     Retired: Unexpected Alarms -- --        Primary Controller    Serial number -- [id number]     Low flow alarm setting -- --     High watt alarm setting -- --     EBB: Patient use -- 11     Replace in -- 28 Months        Backup Controller    Serial number -- [id number]     EBB: Patient use -- 4     Replace EBB in -- 29 Months     Speed & HCT match primary controller -- --        VAD Interrogation    Alarms reported by patient -- N     Unexpected alarms noted upon interrogation -- None     PI events -- Frequent;w/ associated speed drops  PI range (1.8-6.2) w/ occasional speed drops and a history of 3 days.     Damage to equipment is noted -- N     Action taken -- Reviewed proper equipment care and maintenance        Driveline Exit Site    Dressing change done -- Y     Driveline properly secured -- Yes     DLES assessment -- c/d/i     Dressing used -- Weekly kit     Frequency patient changes dressing -- Weekly     Dressing modifications -- --     Dressing change supplier -- --               Education Complete: Yes   Charge the BACKUP controller s backup battery every 6 months  Perform a self test on BACKUP every 6 months  Change the MPU s batteries every 6 months:Yes  Have equipment serviced yearly (if applicable):Yes     Pt's speed was adjusted to 5100 RPM today by Dr. Aguiar. Patient encouraged to contact VAD coordinator if they notice any issues with LVAD parameters or experience any alarms.    The patient's driveline exit site was found to have a good seal and meets criteria for transfer to a weekly dressing. Pt's caregiver was trained on  performing a weekly dressing. No shower training performed this visit. Pt's caregiver stated she was feeling overwhelmed and preferred to receive shower training next week. They will bring their shower kit then in order to receive shower training.

## 2023-05-09 NOTE — PATIENT INSTRUCTIONS
Medications:  CHANGE Lisinopril to 5 mg daily.     Instructions:  You had a Zio Patch placed today. It is a leadless heart monitor sticker that should remain in place for the next 14 days. Please follow the maintenance and discontinuation instructions offered by the [facility].   Please make sure to remain well hydrated.   Please schedule a lab visit in a week and obtain a follow up LDH lab.   Your speed was adjusted to 5100 RPM today. Please let your VAD coordinator know if you notice any issues with your LVAD parameters or experience any alarms.  Please follow up with your primary VAD coordinator regarding obtaining your home doppler.   A urine analysis has been added to your labs today.   Your driveline exit site was found to have a good seal and meets criteria for transfer to a weekly dressing. Your caregiver was trained on performing a weekly dressing and you have been sent home with a weekly dressing kit.   Please bring your shower bag to your next clinic appointment so that you can be trained on how to use it. Do not shower. Do not start showering until a VAD coordinator provides approval.     Follow-up: (make these appointments before you leave)  1. Please follow-up with Dr. Aguiar on 5/16/23 with labs priors, as scheduled .    2. Please follow-up with VAD [facility] on 6/7/23 with labs priors, as scheduled .        Page the VAD Coordinator on call if you gain more than 3 lb in a day or 5 in a week. Please also page if you feel unwell or have alarms.   Great to see you in clinic today. To Page the VAD Coordinator on call, dial [phone] option #4 and ask to speak to the VAD coordinator on call.

## 2023-05-10 ENCOUNTER — MYC MEDICAL ADVICE (OUTPATIENT)
Dept: OTHER | Age: 58
End: 2023-05-10

## 2023-05-10 DIAGNOSIS — Z95.811 LVAD (LEFT VENTRICULAR ASSIST DEVICE) PRESENT (H): ICD-10-CM

## 2023-05-10 DIAGNOSIS — I50.22 CHRONIC SYSTOLIC CONGESTIVE HEART FAILURE (H): ICD-10-CM

## 2023-05-10 DIAGNOSIS — Z95.811 LEFT VENTRICULAR ASSIST DEVICE PRESENT (H): ICD-10-CM

## 2023-05-10 DIAGNOSIS — I50.22 CHRONIC SYSTOLIC (CONGESTIVE) HEART FAILURE (H): ICD-10-CM

## 2023-05-10 LAB
ALBUMIN UR-MCNC: 10 MG/DL
APPEARANCE UR: CLEAR
BILIRUB UR QL STRIP: NEGATIVE
COLOR UR AUTO: YELLOW
GLUCOSE UR STRIP-MCNC: >=1000 MG/DL
HGB UR QL STRIP: NEGATIVE
HYALINE CASTS: 1 /LPF
KETONES UR STRIP-MCNC: NEGATIVE MG/DL
LEUKOCYTE ESTERASE UR QL STRIP: NEGATIVE
MUCOUS THREADS #/AREA URNS LPF: PRESENT /LPF
NITRATE UR QL: NEGATIVE
PH UR STRIP: 5.5 [PH] (ref 5–7)
RBC URINE: 1 /HPF
SP GR UR STRIP: 1.02 (ref 1–1.03)
UROBILINOGEN UR STRIP-MCNC: NORMAL MG/DL
WBC URINE: 4 /HPF

## 2023-05-11 RX ORDER — OXYCODONE HYDROCHLORIDE 5 MG/1
5 TABLET ORAL EVERY 8 HOURS PRN
Qty: 10 TABLET | Refills: 0 | Status: SHIPPED | OUTPATIENT
Start: 2023-05-11 | End: 2023-05-22

## 2023-05-11 RX ORDER — AMOXICILLIN 500 MG/1
2000 TABLET, FILM COATED ORAL PRN
Qty: 4 TABLET | Refills: 0 | Status: SHIPPED | OUTPATIENT
Start: 2023-05-11 | End: 2023-07-17

## 2023-05-11 NOTE — PROGRESS NOTES
CVTS Followup  Patient known to service after recent HM3 LVAD implant.  He's doing well since discharge. Energy is improving. Pain is well controlled. His diet is well tolerated. He is participating in cardiac rehab. He returns today for routine postop check.    BP (!) 60/0 (BP Location: Left arm, Patient Position: Chair, Cuff Size: Adult Regular)   Pulse 65   Wt 81.2 kg (179 lb)   SpO2 93%   BMI 28.09 kg/m      In chair, conversant, comfortable  CTAB, RRR[employer] Inc c/d/i, Driveline c/d/i  Motor, sensation, pulses intact    A/P: s/p HM3 LVAD implant  - OK to discharge from surgical services  - Call or return with concerns  - Continue cardiac rehab    Dakota Waller  Cardiothoracic surgery  [phone]

## 2023-05-14 LAB
MDC_IDC_EPISODE_DTM: NORMAL
MDC_IDC_EPISODE_ID: NORMAL
MDC_IDC_EPISODE_TYPE: NORMAL
MDC_IDC_LEAD_IMPLANT_DT: NORMAL
MDC_IDC_LEAD_LOCATION: NORMAL
MDC_IDC_LEAD_LOCATION_DETAIL_1: NORMAL
MDC_IDC_LEAD_MFG: NORMAL
MDC_IDC_LEAD_MODEL: NORMAL
MDC_IDC_LEAD_POLARITY_TYPE: NORMAL
MDC_IDC_LEAD_SERIAL: NORMAL
MDC_IDC_MSMT_BATTERY_DTM: NORMAL
MDC_IDC_MSMT_BATTERY_REMAINING_LONGEVITY: 132 MO
MDC_IDC_MSMT_BATTERY_REMAINING_PERCENTAGE: 100 %
MDC_IDC_MSMT_BATTERY_STATUS: NORMAL
MDC_IDC_MSMT_CAP_CHARGE_DTM: NORMAL
MDC_IDC_MSMT_CAP_CHARGE_TIME: 10.4 S
MDC_IDC_MSMT_CAP_CHARGE_TYPE: NORMAL
MDC_IDC_MSMT_LEADCHNL_RV_IMPEDANCE_VALUE: 370 OHM
MDC_IDC_MSMT_LEADCHNL_RV_LEAD_CHANNEL_STATUS: NORMAL
MDC_IDC_PG_IMPLANT_DTM: NORMAL
MDC_IDC_PG_MFG: NORMAL
MDC_IDC_PG_MODEL: NORMAL
MDC_IDC_PG_SERIAL: NORMAL
MDC_IDC_PG_TYPE: NORMAL
MDC_IDC_SESS_CLINIC_NAME: NORMAL
MDC_IDC_SESS_DTM: NORMAL
MDC_IDC_SESS_TYPE: NORMAL
MDC_IDC_SET_BRADY_LOWRATE: 40 {BEATS}/MIN
MDC_IDC_SET_BRADY_MODE: NORMAL
MDC_IDC_SET_LEADCHNL_RV_PACING_AMPLITUDE: 2.8 V
MDC_IDC_SET_LEADCHNL_RV_PACING_POLARITY: NORMAL
MDC_IDC_SET_LEADCHNL_RV_PACING_PULSEWIDTH: 0.4 MS
MDC_IDC_SET_LEADCHNL_RV_SENSING_ADAPTATION_MODE: NORMAL
MDC_IDC_SET_LEADCHNL_RV_SENSING_POLARITY: NORMAL
MDC_IDC_SET_LEADCHNL_RV_SENSING_SENSITIVITY: 0.6 MV
MDC_IDC_SET_ZONE_DETECTION_INTERVAL: 300 MS
MDC_IDC_SET_ZONE_DETECTION_INTERVAL: 353 MS
MDC_IDC_SET_ZONE_TYPE: NORMAL
MDC_IDC_SET_ZONE_TYPE: NORMAL
MDC_IDC_SET_ZONE_VENDOR_TYPE: NORMAL
MDC_IDC_SET_ZONE_VENDOR_TYPE: NORMAL
MDC_IDC_STAT_BRADY_DTM_END: NORMAL
MDC_IDC_STAT_BRADY_DTM_START: NORMAL
MDC_IDC_STAT_BRADY_RV_PERCENT_PACED: 0 %
MDC_IDC_STAT_EPISODE_RECENT_COUNT: 0
MDC_IDC_STAT_EPISODE_RECENT_COUNT_DTM_END: NORMAL
MDC_IDC_STAT_EPISODE_RECENT_COUNT_DTM_START: NORMAL
MDC_IDC_STAT_EPISODE_TYPE: NORMAL
MDC_IDC_STAT_EPISODE_VENDOR_TYPE: NORMAL
MDC_IDC_STAT_TACHYTHERAPY_ATP_DELIVERED_RECENT: 0
MDC_IDC_STAT_TACHYTHERAPY_ATP_DELIVERED_TOTAL: 1
MDC_IDC_STAT_TACHYTHERAPY_RECENT_DTM_END: NORMAL
MDC_IDC_STAT_TACHYTHERAPY_RECENT_DTM_START: NORMAL
MDC_IDC_STAT_TACHYTHERAPY_SHOCKS_ABORTED_RECENT: 0
MDC_IDC_STAT_TACHYTHERAPY_SHOCKS_ABORTED_TOTAL: 0
MDC_IDC_STAT_TACHYTHERAPY_SHOCKS_DELIVERED_RECENT: 0
MDC_IDC_STAT_TACHYTHERAPY_SHOCKS_DELIVERED_TOTAL: 0
MDC_IDC_STAT_TACHYTHERAPY_TOTAL_DTM_END: NORMAL
MDC_IDC_STAT_TACHYTHERAPY_TOTAL_DTM_START: NORMAL

## 2023-05-16 ENCOUNTER — ALLIED HEALTH/NURSE VISIT (OUTPATIENT)
Dept: CARDIOLOGY | Facility: CLINIC | Age: 58
End: 2023-05-16
Attending: INTERNAL MEDICINE
Payer: COMMERCIAL

## 2023-05-16 ENCOUNTER — ANTICOAGULATION THERAPY VISIT (OUTPATIENT)
Dept: ANTICOAGULATION | Facility: CLINIC | Age: 58
End: 2023-05-16

## 2023-05-16 ENCOUNTER — LAB (OUTPATIENT)
Dept: LAB | Facility: CLINIC | Age: 58
End: 2023-05-16
Payer: COMMERCIAL

## 2023-05-16 VITALS
WEIGHT: 175.6 LBS | SYSTOLIC BLOOD PRESSURE: 70 MMHG | OXYGEN SATURATION: 100 % | BODY MASS INDEX: 27.56 KG/M2 | HEIGHT: 67 IN | HEART RATE: 62 BPM

## 2023-05-16 DIAGNOSIS — I50.20 HEART FAILURE WITH REDUCED EJECTION FRACTION, NYHA CLASS III (H): ICD-10-CM

## 2023-05-16 DIAGNOSIS — Z79.01 ANTICOAGULATED ON COUMADIN: ICD-10-CM

## 2023-05-16 DIAGNOSIS — I50.23 ACUTE ON CHRONIC SYSTOLIC CONGESTIVE HEART FAILURE (H): Primary | ICD-10-CM

## 2023-05-16 DIAGNOSIS — Z79.899 LONG TERM USE OF DRUG: ICD-10-CM

## 2023-05-16 DIAGNOSIS — I50.22 CHRONIC SYSTOLIC CONGESTIVE HEART FAILURE (H): ICD-10-CM

## 2023-05-16 DIAGNOSIS — Z95.811 LEFT VENTRICULAR ASSIST DEVICE PRESENT (H): ICD-10-CM

## 2023-05-16 DIAGNOSIS — I42.8 NONISCHEMIC CARDIOMYOPATHY (H): ICD-10-CM

## 2023-05-16 DIAGNOSIS — Z95.811 LVAD (LEFT VENTRICULAR ASSIST DEVICE) PRESENT (H): Primary | ICD-10-CM

## 2023-05-16 DIAGNOSIS — Z95.811 LVAD (LEFT VENTRICULAR ASSIST DEVICE) PRESENT (H): ICD-10-CM

## 2023-05-16 DIAGNOSIS — E78.2 MIXED HYPERLIPIDEMIA: ICD-10-CM

## 2023-05-16 DIAGNOSIS — I48.0 PAROXYSMAL ATRIAL FIBRILLATION (H): ICD-10-CM

## 2023-05-16 DIAGNOSIS — I10 BENIGN ESSENTIAL HYPERTENSION: Primary | ICD-10-CM

## 2023-05-16 LAB
ALBUMIN SERPL BCG-MCNC: 4.4 G/DL (ref 3.5–5.2)
ALP SERPL-CCNC: 138 U/L (ref 40–129)
ALT SERPL W P-5'-P-CCNC: 36 U/L (ref 10–50)
ANION GAP SERPL CALCULATED.3IONS-SCNC: 9 MMOL/L (ref 7–15)
AST SERPL W P-5'-P-CCNC: 36 U/L (ref 10–50)
BASOPHILS # BLD AUTO: 0.1 10E3/UL (ref 0–0.2)
BASOPHILS NFR BLD AUTO: 1 %
BILIRUB SERPL-MCNC: 0.5 MG/DL
BUN SERPL-MCNC: 21 MG/DL (ref 6–20)
CALCIUM SERPL-MCNC: 10 MG/DL (ref 8.6–10)
CHLORIDE SERPL-SCNC: 97 MMOL/L (ref 98–107)
CREAT SERPL-MCNC: 1.22 MG/DL (ref 0.67–1.17)
DEPRECATED HCO3 PLAS-SCNC: 29 MMOL/L (ref 22–29)
EOSINOPHIL # BLD AUTO: 0.4 10E3/UL (ref 0–0.7)
EOSINOPHIL NFR BLD AUTO: 3 %
ERYTHROCYTE [DISTWIDTH] IN BLOOD BY AUTOMATED COUNT: 15.6 % (ref 10–15)
GFR SERPL CREATININE-BSD FRML MDRD: 70 ML/MIN/1.73M2
GLUCOSE SERPL-MCNC: 115 MG/DL (ref 70–99)
HCT VFR BLD AUTO: 41.9 % (ref 40–53)
HGB BLD-MCNC: 13.4 G/DL (ref 13.3–17.7)
IMM GRANULOCYTES # BLD: 0.2 10E3/UL
IMM GRANULOCYTES NFR BLD: 1 %
INR PPP: 2.3 (ref 0.85–1.15)
LDH SERPL L TO P-CCNC: 289 U/L (ref 0–250)
LYMPHOCYTES # BLD AUTO: 2.4 10E3/UL (ref 0.8–5.3)
LYMPHOCYTES NFR BLD AUTO: 18 %
MCH RBC QN AUTO: 28.7 PG (ref 26.5–33)
MCHC RBC AUTO-ENTMCNC: 32 G/DL (ref 31.5–36.5)
MCV RBC AUTO: 90 FL (ref 78–100)
MONOCYTES # BLD AUTO: 1.2 10E3/UL (ref 0–1.3)
MONOCYTES NFR BLD AUTO: 8 %
NEUTROPHILS # BLD AUTO: 9.7 10E3/UL (ref 1.6–8.3)
NEUTROPHILS NFR BLD AUTO: 69 %
NRBC # BLD AUTO: 0 10E3/UL
NRBC BLD AUTO-RTO: 0 /100
PLATELET # BLD AUTO: 327 10E3/UL (ref 150–450)
POTASSIUM SERPL-SCNC: 5.2 MMOL/L (ref 3.4–5.3)
PROT SERPL-MCNC: 7.3 G/DL (ref 6.4–8.3)
RBC # BLD AUTO: 4.67 10E6/UL (ref 4.4–5.9)
SODIUM SERPL-SCNC: 135 MMOL/L (ref 136–145)
WBC # BLD AUTO: 13.9 10E3/UL (ref 4–11)

## 2023-05-16 PROCEDURE — 36415 COLL VENOUS BLD VENIPUNCTURE: CPT | Performed by: PATHOLOGY

## 2023-05-16 PROCEDURE — G0463 HOSPITAL OUTPT CLINIC VISIT: HCPCS | Mod: 25

## 2023-05-16 PROCEDURE — 99214 OFFICE O/P EST MOD 30 MIN: CPT | Mod: 25 | Performed by: INTERNAL MEDICINE

## 2023-05-16 PROCEDURE — 93750 INTERROGATION VAD IN PERSON: CPT | Performed by: INTERNAL MEDICINE

## 2023-05-16 PROCEDURE — 83615 LACTATE (LD) (LDH) ENZYME: CPT | Performed by: PATHOLOGY

## 2023-05-16 PROCEDURE — 85025 COMPLETE CBC W/AUTO DIFF WBC: CPT | Performed by: PATHOLOGY

## 2023-05-16 PROCEDURE — 85610 PROTHROMBIN TIME: CPT | Performed by: PATHOLOGY

## 2023-05-16 PROCEDURE — G0463 HOSPITAL OUTPT CLINIC VISIT: HCPCS | Mod: 27,25 | Performed by: INTERNAL MEDICINE

## 2023-05-16 PROCEDURE — 80053 COMPREHEN METABOLIC PANEL: CPT | Performed by: PATHOLOGY

## 2023-05-16 ASSESSMENT — PAIN SCALES - GENERAL: PAINLEVEL: NO PAIN (0)

## 2023-05-16 NOTE — PROGRESS NOTES
May 16, 2023     Akshat Fragoso is a 58 year old male with history of HFrEF 2/2 NICM (diagnosed in 1/2017) s/p ICD placement in 6/2017, VT/VF arrest on 3/15/2023 requiring CPR for 6 mins with cardiogenic shock s/p HM3 LVAD implantation as DT 3/23/2023 c/b postop AF (on amiodarone and digoxin) and HAP, moderate TR s/p TV annuloplasty with 32 mm MC3 partial ring 3/23/2023, PFO closure 3/23/2023, chronic tobacco/marijuana use, COPD, HTN, CKD stage III, who presents today for post LVAD implant follow up,      He was admitted on 3/15/2023 for cardiac arrest in the setting of VT that was slower than his programmed VT threshold for intervention. Patient had been undergoing workup at [facility] for LVAD as destination therapy (initially evaluated for transplant though patient having trouble quitting marijuana/tobacco use). During hospitalization, he extensive pre-operative work-up and on multi-disciplinary discussion, was determined to be an appropriate candidate for placement of the HM3 LVAD. He underwent HM3 LVAD implantation on 3/23/2023 with postop course c/b AF with RVR requiring amiodarone gtt, volume overload, and HAP treated with IV vancomycin and zosyn for 5 days. He was discharged to home on 4/9/2023.     Overall he has been doing very well and feels actually significantly better since changing the medications and breath for last time.  Since that the blood pressure is better and he feels less tired and fatigued.  He denies any with alarms, PIs have been more stable in the 2.5-3.5 range with a few higher numbers.  No palpitations dizziness lightheadedness.  He did have the Zio patch on however it fell off and sending it back now.  No other complaints overall no bleeding or other issues     LVAD interrogation:  LVAD was interrogated today at bedside.  Speed was initially set at 5100 RPM, PI was 2.9 and power was 3.6.    No changes were made to the LVAD settings today.     Current cardiac medications  - Lisinopril 10 mg  daily  - Spironolactone 12.5 mg daily  - Empagliflozin 10 mg daily  - Bumex 1 mg daily  - Digoxin 250 mcg daily  - Amiodarone 200 mg daily  - ASA 81 mg daily  - Warfarin (INR goal 2-3)         PAST MEDICAL HISTORY:  Past Medical History:   Diagnosis Date     Acute right lumbar radiculopathy 11/02/2015     Acute systolic heart failure (H) 01/10/2017     Cardiomyopathy (H) 01/10/2017     CKD (chronic kidney disease) stage 3, GFR 30-59 ml/min (H) 01/30/2020     JOHNSON (dyspnea on exertion)      ED (erectile dysfunction) 10/02/2019     Erectile dysfunction      ICD (implantable cardioverter-defibrillator) in place- Kuli Kuli, single chamber- NOT dependent 10/09/2017     Personal history of smoking 01/04/2017     Pulmonary nodules 01/17/2017    CT 11/2018:  Bilateral pulmonary nodules measuring up to 4 mm. No new or enlarging pulmonary nodules.     FAMILY HISTORY:  Family History   Problem Relation Age of Onset     Parkinsonism Mother      Atrial fibrillation Father      Heart Failure Father      Prostate Cancer Father      Skin Cancer Father      Anorexia nervosa Daughter      Other - See Comments Granddaughter         premature birth     SOCIAL HISTORY:  Social History     Socioeconomic History     Marital status:      Spouse name: Cheryl     Number of children: 2   Occupational History     Occupation: Construction      Employer: S[employer]   Tobacco Use     Smoking status: Former     Packs/day: 0.25     Years: 15.00     Pack years: 3.75     Types: Cigarettes     Smokeless tobacco: Former     Quit date: 3/15/2023   Vaping Use     Vaping status: Never Used   Substance and Sexual Activity     Alcohol use: No     Alcohol/week: 0.0 standard drinks of alcohol     Drug use: No     Sexual activity: Yes     Partners: Female     Birth control/protection: Condom   Other Topics Concern     Parent/sibling w/ CABG, MI or angioplasty before 65F 55M? Yes     Comment: both parents had stints placed      CURRENT  "MEDICATIONS:  Current Outpatient Medications   Medication     acetaminophen (TYLENOL) 325 MG tablet     amiodarone (PACERONE) 200 MG tablet     amoxicillin (AMOXIL) 500 MG tablet     aspirin (ASA) 81 MG chewable tablet     bumetanide (BUMEX) 1 MG tablet     digoxin (LANOXIN) 250 MCG tablet     empagliflozin (JARDIANCE) 10 MG TABS tablet     gabapentin (NEURONTIN) 300 MG capsule     lisinopril (ZESTRIL) 5 MG tablet     methocarbamol (ROBAXIN) 500 MG tablet     nystatin (MYCOSTATIN) 510389 UNIT/ML suspension     oxyCODONE (ROXICODONE) 5 MG tablet     pantoprazole (PROTONIX) 20 MG EC tablet     polyethylene glycol (MIRALAX) 17 GM/Dose powder     senna-docusate (SENOKOT-S/PERICOLACE) 8.6-50 MG tablet     spironolactone (ALDACTONE) 25 MG tablet     warfarin ANTICOAGULANT (COUMADIN) 5 MG tablet     No current facility-administered medications for this visit.     ROS:   Constitutional: No fever, chills, or sweats. Weight is 0 lbs 0 oz  ENT: No visual disturbance, ear ache, epistaxis, sore throat.   Allergies/Immunologic: Negative.   Respiratory: No cough, hemoptysis.   Cardiovascular: As per HPI.   GI: No nausea, vomiting, hematemesis, melena, or hematochezia.   : No urinary frequency, dysuria, or hematuria.   Integument: Negative.   Psychiatric: Pleasant, no major depression noted  Neuro: No focal neurological deficits noted  Endocrinology: Negative.   Musculoskeletal: As per HPI.      EXAM:  BP (!) 70/0 (BP Location: Right arm, Patient Position: Sitting, Cuff Size: Adult Regular)   Pulse 62   Ht 1.691 m (5' 6.58\")   Wt 79.7 kg (175 lb 9.6 oz)   SpO2 100%   BMI 27.86 kg/m    General Appearance: AOx3, no clubbing/cyanosis, no edema, no JVD  HEENT: PERRL, EOMI, no pharyngeal erythema  Respiratory: CTAB, no wheezing, no crackles  Cardiovascular: LVAD hum  GI: no distension, normoactive bowel sounds, soft, not tender, no rebound tenderness or guarding, no hepatosplenomegaly  Genitourinary: no CVA tenderness  Skin: no " rash  Musculoskeletal: no deformities, no joint swelling, no pitting edema bilaterally   Neurologic: CN grossly intact, no focal neurological deficits, no asterixis      Labs:  Lab Results   Component Value Date    WBC 15.2 (H) 05/09/2023    HGB 12.4 (L) 05/09/2023    HCT 40.1 05/09/2023     05/09/2023     (L) 05/09/2023    POTASSIUM 4.4 05/09/2023    CHLORIDE 97 (L) 05/09/2023    CO2 31 (H) 05/09/2023    BUN 23.0 (H) 05/09/2023    CR 1.35 (H) 05/09/2023     (H) 05/09/2023    DD 3.60 (H) 04/18/2023    NTBNPI 31,087 (H) 03/20/2023    NTBNP 7,719 (H) 04/18/2023    TROPI 0.033 01/09/2017    AST 33 05/09/2023    ALT 23 05/09/2023    ALKPHOS 139 (H) 05/09/2023    BILITOTAL 0.5 05/09/2023    INR 3.22 (H) 05/09/2023     Labs:  Reviewed.  LDH slightly elevated lipase above 500.  White cell count 15,000 creatinine improved a little bit to 1.35     Testing/Procedures:  ICD check 4/12/2023  Device: [employer] D150 DYNAGEN  Normal device function  Mode: VVI 40 bpm  : 0%  Intrinsic rhythm: VS @ 91 bpm  R waves measured 2 mV today which is not a new finding since LVAD implant on 3/23/23. RV threshold measured 1.5 V @ 0.4 ms  Estimated battery longevity to RRT = 10.5 years  Anticoagulant: warfarin  Ventricular Arrhythmia: 1 episode recorded in the VT monitor zon greg 3/28/23 @ 1303 - 14 sec, 175 bpm  Setting Changes: None     TTE 3/28/2023  Technically difficult study.Extremely poor acoustic windows.  Limited information was obtained during study.  LVAD cannula was seen in the expected anatomic position in the LV apex.  HM3 at 5200RPM.  Septum normal.  LVIDd 56mm.  Aortic valve not well visualized. No AI.  Normal outflow velocity.  Global right ventricular function is moderately to severely reduced.  Small pericardial effusion with organizing material in pericardial cavity.     TTE 5/3/23:  Please refer to the EPIC report for measurements performed at different LVAD speed settings.  HM3 at 5200RPM at  baseline.  LVIDd 43mm.  Septum normal.  Aortic valve remain closed at baseline.     Assessment and Plan:   Akshat Fragoso is a 58 year old male with history of HFrEF 2/2 NICM (diagnosed in 1/2017) s/p ICD placement in 6/2017, VT/VF arrest on 3/15/2023 requiring CPR for 6 mins with cardiogenic shock s/p HM3 LVAD implantation as DT 3/23/2023 c/b postop AF (on amiodarone and digoxin) and HAP, moderate TR s/p TV annuloplasty with 32 mm MC3 partial ring 3/23/2023, PFO closure 3/23/2023, chronic tobacco/marijuana use, COPD, HTN, CKD stage III, who presents today for post LVAD implant follow up.  Overall he has been feeling better without any new issues.  There is no new complaints and in fact feels significantly better since changing the antihypertensive dosing and the LVAD speed.  We will not make any changes today therefore no LVAD alarms or any other concerning issues.  There is no bleeding.  MAP was 70 which is perfectly appropriate at this point.  No medication changes today continue all as prescribed.    Chronic systolic heart failure secondary to NICM with cardiogenic shock s/p HM III LVAD. ACC/AHA Stage D, NYHA Class IIIB  Moderate TR s/p TV annuloplasty with 32 mm MC3 partial ring 3/23/2023.  Has a history of NICM diagnosed in 1/2017 and underwent ICD for primary SCD prophylaxis in 4/2017. Patient had been undergoing workup at [facility] for LVAD as destination therapy (initially evaluated for transplant though patient having trouble quitting marijuana/tobacco use). He had cardiac arrest in the setting of VT that was slower than his programmed VT threshold for intervention on 3/15/2023. Received CPR for 6 mins with ROSC and developed cardiogenic shock. During hospitalization, he underwent HM3 LVAD implantation on 3/23/2023 with postop course c/b AF with RVR requiring amiodarone gtt, volume overload, and HAP treated with IV vancomycin and zosyn for 5 days. He was discharged to home on 4/9/2023.      Persistent  lukocytosis.   ID noted no indication for long term antibiotics inpatient given no acute findings on CT. Repeat CT Chest/Abd/Pelvis on 4/18/2023 for persistent leukocytosis with stable findings. CRP trended down. WBC increased today to 17 from 14 earlier. No acute findings aside from pain.     Afib with RVR, resolved.   - Continue Amiodarone and Digoxin.  - Warfarin with INR goal 2-3     HTN.   - MAP stable as above cutting back lisinopril     I appreciate the opportunity to participate in the care of Akshat Fragoso . Please do not hesitate to contact me with any further questions.     Sincerely,   Shaun Aguiar MD  [facility] Division of Cardiology

## 2023-05-16 NOTE — NURSING NOTE
Chief Complaint   Patient presents with     Follow Up     Post-vad implant 3/23/23 8 week with labs prior     Vitals were taken, medications reconciled.    Mile Tyler, EMT   3:02 PM

## 2023-05-16 NOTE — NURSING NOTE
Patient and spouse presented to clinic for education on showering and the use of the shower bag.       Provided instructions on how to safely use shower bag and cover LVAD drive line exit site dressing and modular cable during showers.     Instructed patient that if they are using a daily dressing, it should be changed immediately following a shower.    Instructed patient that if they are using a weekly dressing, dressing can remain in place for up to 1 week.  o The weekly dressing must be changed if there is an accumulation of drainage.  o The weekly dressing must be changed if there is moisture under the dressing after the shower.   o The weekly dressing needs to be changed earlier if the dressing has come loose after the shower.    The shower bag must stay attached to patient at all times throughout the shower.      Patient and spouse requested to review sterile weekly dressing change; spouse reports feeling much more comfortable after doing the dressing a second time. Encouraged them to reach out if they have any further questions about dressing change or anchoring.

## 2023-05-16 NOTE — PATIENT INSTRUCTIONS
Medications:  NO changes in medications.    Instructions:  Let VAD coordinator know if you have any questions about daily dressing change or showering technique.   Page VAD coordinator on call if you have any more palpitations.   3.   Okay to shower.     Follow-up: (make these appointments before you leave)  1. Please follow-up with VAD RABIA on 6/7/23 with labs prior.   2. Please follow-up with Dr. Aguiar on 7/25/23 months with labs prior.        Page the VAD Coordinator on call if you gain more than 3 lb in a day or 5 in a week. Please also page if you feel unwell or have alarms.   Great to see you in clinic today. To Page the VAD Coordinator on call, dial [phone] option #[phone] and ask to speak to the VAD coordinator on call.

## 2023-05-16 NOTE — PROGRESS NOTES
ANTICOAGULATION MANAGEMENT     Akshat Fragoso 58 year old male is on warfarin with therapeutic INR result. (Goal INR 2.0-3.0)    Recent labs: (last 7 days)     05/16/23  1437   INR 2.30*       ASSESSMENT       Source(s): Chart Review       Warfarin doses taken: Reviewed in chart    Diet: No new diet changes identified    Medication/supplement changes: None noted    New illness, injury, or hospitalization: No    Signs or symptoms of bleeding or clotting: No    Previous result: Supratherapeutic    Additional findings: None         PLAN     Recommended plan for ongoing change(s) affecting INR     Dosing Instructions: Continue your current warfarin dose with next INR in 1 week       Summary  As of 5/16/2023    Full warfarin instructions:  2.5 mg every Tue, Fri; 5 mg all other days   Next INR check:  5/23/2023             Detailed voice message left for Akshat with dosing instructions and follow up date.   Sent Vioozer message with dosing and follow up instructions    Contact [phone] to schedule and with any changes, questions or concerns.     Education provided:     Please call back if any changes to your diet, medications or how you've been taking warfarin    Contact [phone] with any changes, questions or concerns.     Plan made per [facility] anticoagulation protocol and per LVAD protocol    TREV LYN RN  Anticoagulation Clinic  5/16/2023    _______________________________________________________________________     Anticoagulation Episode Summary     Current INR goal:  2.0-3.0   TTR:  28.8 % (4.1 wk)   Target end date:  Indefinite   Send INR reminders to:  ANTICOAG LVAD    Indications    Acute on chronic systolic congestive heart failure (H) [I50.23]  LVAD (left ventricular assist device) present (H) [Z95.811]  Chronic systolic congestive heart failure (H) [I50.22]  Long term use of drug [Z79.899]  Left ventricular assist device present (H) [Z95.811]  Anticoagulated on Coumadin [Z79.01]           Comments:   Follow VAD Anticoag protocol:Yes: HeartMate 3   Bridging: Enoxaparin   Date VAD placed: 3/23/23         Anticoagulation Care Providers     Provider Role Specialty Phone number    Kenzie Moreau MD Referring Advanced Heart Failure and Transplant Cardiology [phone]    Mile Bolivar, APRN CNP Referring Nurse Practitioner [phone]    Shaun Aguiar MD Referring Cardiovascular Disease [phone]

## 2023-05-16 NOTE — NURSING NOTE
MCS VAD Pump Info     Row Name 05/16/23 1518             MCS VAD Information    Implant LVAD      LVAD Pump HeartMate 3         Heartmate 3 LEFT VS    Flow (Lpm) 4.7 Lpm      Pulse Index (PI) 2.9 PI      Speed (rpm) 5100 rpm      Power (mari) 3.6 mari      Current Hct setting 42      Retired: Unexpected Alarms --         Primary Controller    Serial number [id number]      Low flow alarm setting 2.5      High watt alarm setting na      EBB: Patient use 11      Replace in 28 Months         Backup Controller    Serial number [id number]      EBB: Patient use 4      Replace EBB in 29 Months      Speed & HCT match primary controller --  NA         VAD Interrogation    Alarms reported by patient N      Unexpected alarms noted upon interrogation None      PI events Occasional  Hx back to 5/6/23, PI range 1.7-6.0.      Damage to equipment is noted N      Action taken Reviewed proper equipment care and maintenance         Driveline Exit Site    Dressing change done Y  Re-taught weekly dressing      Driveline properly secured Yes      DLES assessment c/d/i      Dressing used Weekly kit      Frequency patient changes dressing Weekly      Dressing modifications --      Dressing change supplier --                Re-educated pt's spouse on weekly sterile dressing change per pt's and spouse's request. Spouse notes that she feels much more comfortable with the task after review. Performed shower education with both pt and caregiver.

## 2023-05-16 NOTE — LETTER
5/16/2023      RE: Akshat Fragoso  [address]  [facility] [address]       Dear Colleague,    Thank you for the opportunity to participate in the care of your patient, Akshat Fragoso, at the [facility] [place] [facility]. Please see a copy of my visit note below.    May 16, 2023     Akshat Fragoso is a 58 year old male with history of HFrEF 2/2 NICM (diagnosed in 1/2017) s/p ICD placement in 6/2017, VT/VF arrest on 3/15/2023 requiring CPR for 6 mins with cardiogenic shock s/p HM3 LVAD implantation as DT 3/23/2023 c/b postop AF (on amiodarone and digoxin) and HAP, moderate TR s/p TV annuloplasty with 32 mm MC3 partial ring 3/23/2023, PFO closure 3/23/2023, chronic tobacco/marijuana use, COPD, HTN, CKD stage III, who presents today for post LVAD implant follow up,      He was admitted on 3/15/2023 for cardiac arrest in the setting of VT that was slower than his programmed VT threshold for intervention. Patient had been undergoing workup at [place] for LVAD as destination therapy (initially evaluated for transplant though patient having trouble quitting marijuana/tobacco use). During hospitalization, he extensive pre-operative work-up and on multi-disciplinary discussion, was determined to be an appropriate candidate for placement of the HM3 LVAD. He underwent HM3 LVAD implantation on 3/23/2023 with postop course c/b AF with RVR requiring amiodarone gtt, volume overload, and HAP treated with IV vancomycin and zosyn for 5 days. He was discharged to home on 4/9/2023.     Overall he has been doing very well and feels actually significantly better since changing the medications and breath for last time.  Since that the blood pressure is better and he feels less tired and fatigued.  He denies any with alarms, PIs have been more stable in the 2.5-3.5 range with a few higher numbers.  No palpitations dizziness lightheadedness.  He did have the Zio patch on  however it fell off and sending it back now.  No other complaints overall no bleeding or other issues     LVAD interrogation:  LVAD was interrogated today at bedside.  Speed was initially set at 5100 RPM, PI was 2.9 and power was 3.6.    No changes were made to the LVAD settings today.     Current cardiac medications  - Lisinopril 10 mg daily  - Spironolactone 12.5 mg daily  - Empagliflozin 10 mg daily  - Bumex 1 mg daily  - Digoxin 250 mcg daily  - Amiodarone 200 mg daily  - ASA 81 mg daily  - Warfarin (INR goal 2-3)         PAST MEDICAL HISTORY:  Past Medical History:   Diagnosis Date    Acute right lumbar radiculopathy 11/02/2015    Acute systolic heart failure (H) 01/10/2017    Cardiomyopathy (H) 01/10/2017    CKD (chronic kidney disease) stage 3, GFR 30-59 ml/min (H) 01/30/2020    JOHNSON (dyspnea on exertion)     ED (erectile dysfunction) 10/02/2019    Erectile dysfunction     ICD (implantable cardioverter-defibrillator) in place- [place], single chamber- NOT dependent 10/09/2017    Personal history of smoking 01/04/2017    Pulmonary nodules 01/17/2017    CT 11/2018:  Bilateral pulmonary nodules measuring up to 4 mm. No new or enlarging pulmonary nodules.     FAMILY HISTORY:  Family History   Problem Relation Age of Onset    Parkinsonism Mother     Atrial fibrillation Father     Heart Failure Father     Prostate Cancer Father     Skin Cancer Father     Anorexia nervosa Daughter     Other - See Comments Granddaughter         premature birth     SOCIAL HISTORY:  Social History     Socioeconomic History    Marital status:      Spouse name: Cheryl    Number of children: 2   Occupational History    Occupation: Construction      Employer: SELF   Tobacco Use    Smoking status: Former     Packs/day: 0.25     Years: 15.00     Pack years: 3.75     Types: Cigarettes    Smokeless tobacco: Former     Quit date: 3/15/2023   Vaping Use    Vaping status: Never Used   Substance and Sexual Activity    Alcohol  "use: No     Alcohol/week: 0.0 standard drinks of alcohol    Drug use: No    Sexual activity: Yes     Partners: Female     Birth control/protection: Condom   Other Topics Concern    Parent/sibling w/ CABG, MI or angioplasty before 65F 55M? Yes     Comment: both parents had stints placed      CURRENT MEDICATIONS:  Current Outpatient Medications   Medication    acetaminophen (TYLENOL) 325 MG tablet    amiodarone (PACERONE) 200 MG tablet    amoxicillin (AMOXIL) 500 MG tablet    aspirin (ASA) 81 MG chewable tablet    bumetanide (BUMEX) 1 MG tablet    digoxin (LANOXIN) 250 MCG tablet    empagliflozin (JARDIANCE) 10 MG TABS tablet    gabapentin (NEURONTIN) 300 MG capsule    lisinopril (ZESTRIL) 5 MG tablet    methocarbamol (ROBAXIN) 500 MG tablet    nystatin (MYCOSTATIN) 012286 UNIT/ML suspension    oxyCODONE (ROXICODONE) 5 MG tablet    pantoprazole (PROTONIX) 20 MG EC tablet    polyethylene glycol (MIRALAX) 17 GM/Dose powder    senna-docusate (SENOKOT-S/PERICOLACE) 8.6-50 MG tablet    spironolactone (ALDACTONE) 25 MG tablet    warfarin ANTICOAGULANT (COUMADIN) 5 MG tablet     No current facility-administered medications for this visit.     ROS:   Constitutional: No fever, chills, or sweats. Weight is 0 lbs 0 oz  ENT: No visual disturbance, ear ache, epistaxis, sore throat.   Allergies/Immunologic: Negative.   Respiratory: No cough, hemoptysis.   Cardiovascular: As per HPI.   GI: No nausea, vomiting, hematemesis, melena, or hematochezia.   : No urinary frequency, dysuria, or hematuria.   Integument: Negative.   Psychiatric: Pleasant, no major depression noted  Neuro: No focal neurological deficits noted  Endocrinology: Negative.   Musculoskeletal: As per HPI.      EXAM:  BP (!) 70/0 (BP Location: Right arm, Patient Position: Sitting, Cuff Size: Adult Regular)   Pulse 62   Ht 1.691 m (5' 6.58\")   Wt 79.7 kg (175 lb 9.6 oz)   SpO2 100%   BMI 27.86 kg/m    General Appearance: AOx3, no clubbing/cyanosis, no edema, no " JVD  HEENT: PERRL, EOMI, no pharyngeal erythema  Respiratory: CTAB, no wheezing, no crackles  Cardiovascular: LVAD hum  GI: no distension, normoactive bowel sounds, soft, not tender, no rebound tenderness or guarding, no hepatosplenomegaly  Genitourinary: no CVA tenderness  Skin: no rash  Musculoskeletal: no deformities, no joint swelling, no pitting edema bilaterally   Neurologic: CN grossly intact, no focal neurological deficits, no asterixis      Labs:  Lab Results   Component Value Date    WBC 15.2 (H) 05/09/2023    HGB 12.4 (L) 05/09/2023    HCT 40.1 05/09/2023     05/09/2023     (L) 05/09/2023    POTASSIUM 4.4 05/09/2023    CHLORIDE 97 (L) 05/09/2023    CO2 31 (H) 05/09/2023    BUN 23.0 (H) 05/09/2023    CR 1.35 (H) 05/09/2023     (H) 05/09/2023    DD 3.60 (H) 04/18/2023    NTBNPI 31,087 (H) 03/20/2023    NTBNP 7,719 (H) 04/18/2023    TROPI 0.033 01/09/2017    AST 33 05/09/2023    ALT 23 05/09/2023    ALKPHOS 139 (H) 05/09/2023    BILITOTAL 0.5 05/09/2023    INR 3.22 (H) 05/09/2023     Labs:  Reviewed.  LDH slightly elevated lipase above 500.  White cell count 15,000 creatinine improved a little bit to 1.35     Testing/Procedures:  ICD check 4/12/2023  Device: Sendio D150 DYNAGEN  Normal device function  Mode: VVI 40 bpm  : 0%  Intrinsic rhythm: VS @ 91 bpm  R waves measured 2 mV today which is not a new finding since LVAD implant on 3/23/23. RV threshold measured 1.5 V @ 0.4 ms  Estimated battery longevity to RRT = 10.5 years  Anticoagulant: warfarin  Ventricular Arrhythmia: 1 episode recorded in the VT monitor zon greg 3/28/23 @ 1303 - 14 sec, 175 bpm  Setting Changes: None     TTE 3/28/2023  Technically difficult study.Extremely poor acoustic windows.  Limited information was obtained during study.  LVAD cannula was seen in the expected anatomic position in the LV apex.  HM3 at 5200RPM.  Septum normal.  LVIDd 56mm.  Aortic valve not well visualized. No AI.  Normal outflow  velocity.  Global right ventricular function is moderately to severely reduced.  Small pericardial effusion with organizing material in pericardial cavity.     TTE 5/3/23:  Please refer to the EPIC report for measurements performed at different LVAD speed settings.  HM3 at 5200RPM at baseline.  LVIDd 43mm.  Septum normal.  Aortic valve remain closed at baseline.     Assessment and Plan:   Akshat Fragoso is a 58 year old male with history of HFrEF 2/2 NICM (diagnosed in 1/2017) s/p ICD placement in 6/2017, VT/VF arrest on 3/15/2023 requiring CPR for 6 mins with cardiogenic shock s/p HM3 LVAD implantation as DT 3/23/2023 c/b postop AF (on amiodarone and digoxin) and HAP, moderate TR s/p TV annuloplasty with 32 mm MC3 partial ring 3/23/2023, PFO closure 3/23/2023, chronic tobacco/marijuana use, COPD, HTN, CKD stage III, who presents today for post LVAD implant follow up.  Overall he has been feeling better without any new issues.  There is no new complaints and in fact feels significantly better since changing the antihypertensive dosing and the LVAD speed.  We will not make any changes today therefore no LVAD alarms or any other concerning issues.  There is no bleeding.  MAP was 70 which is perfectly appropriate at this point.  No medication changes today continue all as prescribed.    Chronic systolic heart failure secondary to NICM with cardiogenic shock s/p HM III LVAD. ACC/AHA Stage D, NYHA Class IIIB  Moderate TR s/p TV annuloplasty with 32 mm MC3 partial ring 3/23/2023.  Has a history of NICM diagnosed in 1/2017 and underwent ICD for primary SCD prophylaxis in 4/2017. Patient had been undergoing workup at [place] for LVAD as destination therapy (initially evaluated for transplant though patient having trouble quitting marijuana/tobacco use). He had cardiac arrest in the setting of VT that was slower than his programmed VT threshold for intervention on 3/15/2023. Received CPR for 6 mins with ROSC and developed  cardiogenic shock. During hospitalization, he underwent HM3 LVAD implantation on 3/23/2023 with postop course c/b AF with RVR requiring amiodarone gtt, volume overload, and HAP treated with IV vancomycin and zosyn for 5 days. He was discharged to home on 4/9/2023.      Persistent lukocytosis.   ID noted no indication for long term antibiotics inpatient given no acute findings on CT. Repeat CT Chest/Abd/Pelvis on 4/18/2023 for persistent leukocytosis with stable findings. CRP trended down. WBC increased today to 17 from 14 earlier. No acute findings aside from pain.     Afib with RVR, resolved.   - Continue Amiodarone and Digoxin.  - Warfarin with INR goal 2-3     HTN.   - MAP stable as above cutting back lisinopril     I appreciate the opportunity to participate in the care of Akshat DONATO [place] . Please do not hesitate to contact me with any further questions.     Sincerely,   Shaun Aguiar MD  [facility] Division of Cardiology

## 2023-05-17 ASSESSMENT — ANXIETY QUESTIONNAIRES
7. FEELING AFRAID AS IF SOMETHING AWFUL MIGHT HAPPEN: NOT AT ALL
8. IF YOU CHECKED OFF ANY PROBLEMS, HOW DIFFICULT HAVE THESE MADE IT FOR YOU TO DO YOUR WORK, TAKE CARE OF THINGS AT HOME, OR GET ALONG WITH OTHER PEOPLE?: NOT DIFFICULT AT ALL
5. BEING SO RESTLESS THAT IT IS HARD TO SIT STILL: NOT AT ALL
4. TROUBLE RELAXING: NOT AT ALL
IF YOU CHECKED OFF ANY PROBLEMS ON THIS QUESTIONNAIRE, HOW DIFFICULT HAVE THESE PROBLEMS MADE IT FOR YOU TO DO YOUR WORK, TAKE CARE OF THINGS AT HOME, OR GET ALONG WITH OTHER PEOPLE: NOT DIFFICULT AT ALL
GAD7 TOTAL SCORE: 0
6. BECOMING EASILY ANNOYED OR IRRITABLE: NOT AT ALL
GAD7 TOTAL SCORE: 0
1. FEELING NERVOUS, ANXIOUS, OR ON EDGE: NOT AT ALL
2. NOT BEING ABLE TO STOP OR CONTROL WORRYING: NOT AT ALL
3. WORRYING TOO MUCH ABOUT DIFFERENT THINGS: NOT AT ALL
7. FEELING AFRAID AS IF SOMETHING AWFUL MIGHT HAPPEN: NOT AT ALL

## 2023-05-17 ASSESSMENT — PAIN SCALES - PAIN ENJOYMENT GENERAL ACTIVITY SCALE (PEG)
AVG_PAIN_PASTWEEK: 8
INTERFERED_GENERAL_ACTIVITY: 9
AVG_PAIN_PASTWEEK: 8
INTERFERED_GENERAL_ACTIVITY: 9
PEG_TOTALSCORE: 8.67
INTERFERED_ENJOYMENT_LIFE: 9
PEG_TOTALSCORE: 8.67
INTERFERED_ENJOYMENT_LIFE: 9

## 2023-05-17 NOTE — PROGRESS NOTES
Date:5/18/2023      COMPREHENSIVE PAIN CLINIC INITIAL EVALUATION    I had the pleasure of meeting Mr. Akshat Fragoso on 5/18/2023 in the [facility] in consult for Dr. Rashid with regards to his pain.    Subjective:  The patient is a 58 year old male with past medical history of LVAD, on chronic anticoagulation, DM type II, CHF class III, HTN, nonischemic cardiomyaopathhy, paroxysmal atrial fibrillation, HLD, COPD who presents for evaluation of chronic pain.      Patient endorses left rib pain due to chest compressions/CPR.  Imaging was reviewed and he has healing anterolateral rib fractures.  He has VT/VF arrest on 03/15/2023.  LVAD implanted 03/23/2023.  He has to lay on his back due to LVAD placement and this has aggravated his sciatica.  The patient describes the pain as ribs: sharp, difficulty breathing, deep breaths, ribs are    Sciatica: constant sitting on a baseball that radiates down the legs in the feet L>R, dull achey.  He denies any numbness or tingling. He denies burning,electrical pain.  He reports that the pain is made worse by sitting too long, laying on back too long.  His pain is improved with heat, tylenol, oxycodone, stretching/walking.  He has reduced exercise stamina since LVAD placement.  Pain interferes with ADL's and sleep.  He did a lot of PT in the hospital.  He rates his average pain score at 8/10, but it can be as low as 7/10 or as severe as 9/10. He report sciatica pain was treated at [facility] years ago with IM steroids.    He denies any new problems with falls or balance, any new numbness or weakness of the arms or legs, any new bowel or bladder incontinence, any night sweats or unexplained fevers, or any sudden or unexpected weight loss.     05/16/2023, 05/09/2023 progress note reviewed Dr. Shaun Aguiar, Cardiology  05/03/2023 progress note reviewed Mile Bolivar, NP Cardiology    Akshat Fragoso has not been seen at a pain clinic in the past.       Patient reported symptoms:  Patient Supplied Answers To the  Pain Questionnaire      5/17/2023     2:14 PM   [facility] -  Patient Entered Questionnaire/Answers   What number best describes your pain right now:  0 = No pain  to  10 = Worst pain imaginable 9   How would you describe the pain dull, aching    throbbing    pressure   Which of the following worsen your pain standing    sitting    walking    exercise    coughing / sneezing   Which of the following improve or reduce your pain lying down    medication   What number best describes your average pain for the past week:  0 = No pain  to  10 = Worst pain imaginable 9   What number best describes your LOWEST pain in past 24 hours:  0 = No pain  to  10 = Worst pain imaginable 9   What number best describes your WORST pain in past 24 hours:  0 = No pain  to  10 = Worst pain imaginable 10   When is your pain worst Constant       Current Treatments:  Acetaminophen 1000mg TID  Gabapentin 300mg TID  Oxycodone 5mg TID - only for severe pain  Miralax/Senna  Warfarin        Previous Medication Treatments:  Anti-convulsants: none  Muscle relaxors: Methocarbamol 500mg QID  Anti-depressants: none  Acetaminophen/NSAIDs: can't take NSAIDs on coumadin, acetaminophen is helpful  Topicals: lidocaine patch - not helpful  Opioids: oxycodone is helpful  Marijuana cannot use due to on heart transplant waiting list    Other Treatments:  Physical therapy: In the hospital  Pain Psychology: no  Chiropractic: no  Acupuncture: no  TENs Unit: no  Injections: none  Steroid injection IM at [facility]  Surgeries: LVAD placement 03/23/2023    Past Medical History:  Medical history reviewed.   Past Medical History:   Diagnosis Date     Acute right lumbar radiculopathy 11/02/2015     Acute systolic heart failure (H) 01/10/2017     Cardiomyopathy (H) 01/10/2017     CKD (chronic kidney disease) stage 3, GFR 30-59 ml/min (H) 01/30/2020     JOHNSON (dyspnea on exertion)      ED (erectile  dysfunction) 10/02/2019     Erectile dysfunction      ICD (implantable cardioverter-defibrillator) in place- [employer], single chamber- NOT dependent 10/09/2017     Personal history of smoking 01/04/2017     Pulmonary nodules 01/17/2017    CT 11/2018:  Bilateral pulmonary nodules measuring up to 4 mm. No new or enlarging pulmonary nodules.      Patient Active Problem List   Diagnosis     CHF (congestive heart failure) (H)     Cardiomyopathy, nonischemic (H)     Personal history of tobacco use, presenting hazards to health     ICD (implantable cardioverter-defibrillator) in place- [employer], single chamber- NOT dependent     Chronic systolic heart failure (H)     JOHNSON (dyspnea on exertion)     Acute on chronic systolic congestive heart failure (H)     Hypertrophic nonobstructive cardiomyopathy (H)     Other ill-defined heart diseases     Erectile dysfunction, unspecified erectile dysfunction type     COPD, severe (H)     Ventricular tachycardia (H)     LVAD (left ventricular assist device) present (H)     Chronic systolic congestive heart failure (H)     Long term use of drug     Left ventricular assist device present (H)     Anticoagulated on Coumadin       Past Surgical History:  Pertinent surgical history reviewed.   Past Surgical History:   Procedure Laterality Date     CARDIAC SURGERY  2016    defibrillator placement     COLONOSCOPY N/A 3/22/2023    Procedure: Colonoscopy;  Surgeon: Khushboo Crespo MD;  Location:  GI     CV INTRA AORTIC BALLOON N/A 3/22/2023    Procedure: Intraprocedure Aortic Balloon Pump Insertion;  Surgeon: Danie Snyder MD;  Location:  HEART CARDIAC CATH LAB     CV RIGHT HEART CATH MEASUREMENTS RECORDED N/A 11/20/2019    Procedure: CV RIGHT HEART CATH;  Surgeon: Jeff Aguilar MD;  Location:  HEART CARDIAC CATH LAB     CV RIGHT HEART CATH MEASUREMENTS RECORDED N/A 3/22/2023    Procedure: Right Heart Catheterization;  Surgeon: Lillian  MD Danie;  Location:  HEART CARDIAC CATH LAB     INSERT VENTRICULAR ASSIST DEVICE LEFT (HEARTMATE II) N/A 3/23/2023    Procedure: MEDIAN STERNOTOMY, TRANSESOPHAGEAL ECHOCARDIOGRAM PER ANESTHESIA, CARDIOPULMONARY BYPASS PUMP, INSERTION, LEFT VENTRICULAR ASSIST DEVICE (HEARTMATE IIl), TRICUSPID VALVE REPAIR WITH EDWRDS 32 MM MC3 TRICUSPID ANNULOPLASTY RING;  Surgeon: Elena Matias MD;  Location:  OR     None          Medications: Pertinent medications reviewed.  Current Outpatient Medications   Medication Sig Dispense Refill     acetaminophen (TYLENOL) 325 MG tablet Take 2 tablets (650 mg) by mouth every 4 hours as needed for other (For optimal non-opioid multimodal pain management to improve pain control.) 100 tablet 0     amiodarone (PACERONE) 200 MG tablet Take 1 tablet (200 mg) by mouth daily 30 tablet 11     amoxicillin (AMOXIL) 500 MG tablet Take 4 tablets (2,000 mg) by mouth as needed (Take one hour before dental procedure.) 4 tablet 0     aspirin (ASA) 81 MG chewable tablet 1 tablet (81 mg) by Oral or NG Tube route daily 90 tablet 3     bumetanide (BUMEX) 1 MG tablet Take 1mg every Monday, Wednesday, and Friday (Patient not taking: Reported on 5/9/2023) 30 tablet 0     digoxin (LANOXIN) 250 MCG tablet Take 1 tablet (250 mcg) by mouth daily 30 tablet 11     empagliflozin (JARDIANCE) 10 MG TABS tablet Take 1 tablet (10 mg) by mouth daily 90 tablet 3     gabapentin (NEURONTIN) 300 MG capsule 1 capsule (300 mg) by Oral or Feeding Tube route 3 times daily 90 capsule 0     lisinopril (ZESTRIL) 5 MG tablet Take 1 tablet (5 mg) by mouth daily 90 tablet 3     methocarbamol (ROBAXIN) 500 MG tablet Take 1 tablet (500 mg) by mouth 4 times daily 60 tablet 0     nystatin (MYCOSTATIN) 915339 UNIT/ML suspension Take 5 mLs (500,000 Units) by mouth 4 times daily 100 mL 0     oxyCODONE (ROXICODONE) 5 MG tablet Take 1 tablet (5 mg) by mouth every 8 hours as needed for severe pain 10 tablet 0     pantoprazole  (PROTONIX) 20 MG EC tablet Take 1 tablet (20 mg) by mouth every morning (before breakfast) 30 tablet 0     polyethylene glycol (MIRALAX) 17 GM/Dose powder Take 17 g by mouth daily 510 g 0     senna-docusate (SENOKOT-S/PERICOLACE) 8.6-50 MG tablet Take 2 tablets by mouth 2 times daily as needed for constipation 40 tablet 0     spironolactone (ALDACTONE) 25 MG tablet Take 0.5 tablets (12.5 mg) by mouth daily 90 tablet 3     warfarin ANTICOAGULANT (COUMADIN) 5 MG tablet Take 1 tablet (5 mg) by mouth at 6 pm on 4/9 and have INR check 4/10 and have dose adjusted 60 tablet 0       [place] Prescription Monitoring Program reviewed 5/18/2023.  No concern for abuse or misuse of controlled medications based on this report.  MME <50.  05/11/2023 oxycodone 5mg 10 tabs for 3 days. Darian Estevez MD  05/01/2023 oxycodone 5mg 10 tabs for 4 days. Darian Estevez MD      Allergies: Pertinent allergies reviewed.     Allergies   Allergen Reactions     Codeine      Other reaction(s): GI intolerance, Intolerance-Can't Take       Family History:   family history includes Anorexia nervosa in his daughter; Atrial fibrillation in his father; Heart Failure in his father; Other - See Comments in his granddaughter; Parkinsonism in his mother; Prostate Cancer in his father; Skin Cancer in his father.    Social History:   He is  with 2 children.  He no longer uses marijuana since he is on the heart transplant waiting list.  He is self employed in construction business.  He  reports that he has quit smoking. His smoking use included cigarettes. He has a 3.75 pack-year smoking history. He quit smokeless tobacco use about 2 months ago. He reports that he does not drink alcohol and does not use drugs.  Social History     Social History Narrative     Not on file         Review of Systems:      (Positive responses bolded)  GENERAL: fever/chills, fatigue, general unwell feeling, weight gain/loss.  Deconditioned.  HEAD/EYES:  headache, dizziness, or vision  "changes.    EARS/NOSE/THROAT: nosebleeds, hearing loss, sinus infection, earache, tinnitus.  IMMUNE:  allergies, cancer, immune deficiency, or infections.  SKIN:  itching, rash, hives  HEME/Lymphatic: anemia, easy bruising, easy bleeding.  RESPIRATORY: cough, wheezing, or shortness of breath  CARDIOVASCULAR/Circulation: extremity edema, syncope, hypertension, tachycardia, or angina.  GASTROINTESTINAL: abdominal pain, nausea/emesis, diarrhea, constipation,  hematochezia, or melena.  ENDOCRINE:  diabetes, steroid use,  thyroid disease or osteoporosis.  MUSCULOSKELETAL: myalgias, joint pain, stiffness, neck pain, back pain, arthritis, or gout.  GENITOURINARY: frequency, urgency, dysuria, difficulty voiding, hematuria or incontinence.  NEUROLOGIC: weakness, numbness, paresthesias, seizure, tremor, stroke or memory loss.  PSYCHIATRIC: depression, anxiety, stress, suicidal thoughts or mood swings.     Physical Exam:  /61   Pulse 80   Ht 1.715 m (5' 7.5\")   Wt 79.6 kg (175 lb 6.4 oz)   SpO2 94%   BMI 27.07 kg/m      Physical Exam   Constitutional: He is oriented to person, place, and time.  He appears well-developed and well-nourished. He is not in acute distress.   HENT:     Head: Normocephalic and atraumatic.     Eyes: Pupils are equal, round, and reactive to light. EOM are normal. No scleral icterus.   Pulmonary/Chest:  NWOB. No respiratory distress.   Neurological: He is alert and oriented to person, place, and time. Coordination grossly normal. Romberg test negative. Carlin's test is negative.  Skin: Skin is warm and dry. He is not diaphoretic. Multiple well healed surgical scars on abdomen.  Psychiatric: He has a normal mood and affect. His behavior is normal. Judgment and thought content normal.  He answers questions appropriately  MSK: Gait is normal.  He can walk on his toes, heals, heal toe walk and perform heal to shin testing without difficulty.  Pain with palpation to left " ribs.      Lumbar/Thoracic spine:   ROM: flexion 30 degrees, extension 0 degrees, left lateral 0 degrees and right lateral 0 degrees  Rotation/ext to right: painful   Rotation/ext to left: painful   Myofascial tenderness:left para lumbar muscles, right para lumbar muscles  Focal tenderness: No SI joint, gluteal, piriformis, or GT tenderness  Normal 5/5 LE strength bilaterally   Normal sensation to light touch in the lower extremities bilaterally   No allodynia, dysesthesia, or hyperalgesia in the lower extremities bilaterally   Reflexes: Lower extremity reflexes within normal limits bilaterally  Straight leg raise: Positive on the left  Positive on the right      Neurologic exam abnormalities:     Strength   Hip flexion (Iliosoas L1,2,3)    R: 5/5 L: 5/5  Knee extension (quadricepts L2,3,4)   R: 5/5 L: 5/5  Ankle dorsiflexion (tibialis anterior L4,5)  R: 5/5 L: 5/5    Ankle plantarflexion (gastrocnemius, soleus S1-2) R: 5/5 L: 5/5  Big toe extension (ext. Roth lungus L5,S1)  R: 5/5 L: 5/5   Hip extension (gluteus maxiumus L5, S1,2)   R: 5/5 L: 5/5  Hip abduction (gluteus medius L4,5, S1)  R: 5/5 L: 5/5  Knee flexion (hamstrings L5, S1-2)   R: 5/5 L: 5/5  Reflexes:    Patella:  R:  4/4 L: 4/4  Achilles:  R:  2/4 L: 2/4  Sensory:  Light touch: normal bilateral upper and lower extremities         Imaging:  No lumbar imaging to review.    EXAMINATION: CT CHEST ABDOMEN PELVIS W/O CONTRAST, 4/18/2023 5:15 PM     TECHNIQUE:  Helical CT images from the thoracic inlet through the  symphysis pubis were obtained without IV contrast.      COMPARISON: 4/3/2023     HISTORY: LVAD (left ventricular assist device) present (H); Cough,  unspecified type; Leukocytosis, unspecified type     FINDINGS:  CHEST:  LUNGS:   Stable small left pleural effusion and associated passive compressive  atelectasis of the inferior left upper lobe and the left lower lobe.  Mild central bronchial wall thickening.  No new focal consolidation or  groundglass opacity.     MEDIASTINUM:   Unchanged postsurgical mediastinal fat stranding and mild pericardial  thickening. No pericardial effusion.  No thoracic lymphadenopathy. Thyroid is unremarkable.     Cardiac defibrillator with right ventricular lead in the expected  position.  Tricuspid valve repair.  Unchanged left ventricular assist device.  Moderate ventriculomegaly.  Moderate coronary artery calcifications.     ABDOMEN/PELVIS:  LIVER:  Normal.     STOMACH:   Decompressed which limits evaluation     BILIARY:   No biliary ductal dilation. Normal gallbladder.     PANCREAS:   Normal.     SPLEEN:   Normal.     ADRENAL GLANDS:   Normal.     :   Normal kidneys.  Non-thickened urinary bladder.     BOWEL/PERITONEUM:   Normal caliber of the small and large bowel.   Normal appendix.  No pneumoperitoneum. No free fluid.  Colonic diverticulosis.     RETROPERITONEUM/:  Normal.     VESSELS:  Limited evaluation on non-contrast exam.     LYMPH NODES:   No lymphadenopathy.     BONES/SOFT TISSUES:   No acute osseous abnormality. Median sternotomy changes. Healing  bilateral anterolateral rib fractures.                                                                      IMPRESSION:   1.  No acute abnormality.  2.  Stable exam compared to 4/3/2023 with continued small left pleural  effusion.      I have personally reviewed the examination and initial interpretation  and I agree with the findings.         Chest 2 views     INDICATION: Follow consolidation     COMPARISON: 3/29/2023     FINDINGS: Heart size normal. LVAD. Implantable cardiac defibrillator.  Lungs and pulmonary vasculature otherwise appear grossly unchanged.  Mildly prominent thoracic kyphosis.                                                                      IMPRESSION: Unchanged possible left lower lobe  consolidation/atelectasis with possible superimposed pleural effusion.  This area is obscured by the LVAD the frontal view but persists  unchanged on the  lateral view.     FANNY MELARA MD         SYSTEM ID:  [id number]      EMG:  none    Diagnosis:  (M54.50,  G89.29) Chronic bilateral low back pain, unspecified whether sciatica present  (primary encounter diagnosis)  Comment: b/l sciatica flare due to constantly laying on back due to implanted LVAD  Plan: Adult Pain Clinic Follow-Up Order, CT Lumbar         Spine w/o Contrast, Physical Therapy Referral        (M54.16) Lumbar radiculopathy  Comment: no imaging to review.  Plan: Adult Pain Clinic Follow-Up Order, CT Lumbar         Spine w/o Contrast, Physical Therapy Referral          (R07.81) Rib pain on left side  Comment: L) rib fx healing from CPR 03/2023  Plan: Adult Pain Clinic Follow-Up Order, PAIN         INJECTION EVAL/TREAT/FOLLOW UP, Physical         Therapy Referral          (G89.29) Chronic intractable pain  Comment: Patient requesting oxycodone script.   Plan: Adult Pain Clinic Follow-Up Order, PAIN         INJECTION EVAL/TREAT/FOLLOW UP, Ethanol urine,         Drug Confirmation Panel Urine with Creatinine,         CT Lumbar Spine w/o Contrast, Physical Therapy         Referral          (Z95.811) Left ventricular assist device present (H)  Comment: Can't have MRI's.  Plan: On heart transplant list.    (Z79.01) Anticoagulated on Coumadin  Comment: He bruises easily.  Plan: Labs per [facility]    (Z95.811) LVAD (left ventricular assist device) present (H)  Comment: managed by [facility]    (F11.90) Chronic, continuous use of opioids  Comment: He would like us to take over prescribing.  Plan: Ethanol urine, Drug Confirmation Panel Urine         with Creatinine            Plan: A multi modal plan was developed today to treat your pain    Diagnostics: CT chest and abdomen was reviewed today demonstrating healing rib fractures.  There are no images of lumbar spine.      Medications:  Increase gabapentin by one 300mg capsule each week until taking gabapentin 300mg 2  capsules TID.    He does not tolerated muscle relaxants.    Oxycodone 5mg per PCP.  Will obtain UDS today.    The following OTC pain medications may be helpful, use as directed: Voltaren Gel 1% CBD products, Australian Dream Cream, Capsaicin products, Arnica cream  Lidocaine Patch, Solanpas, Biofreeze, Aspercream, Tiger Balm and Sandeep Emu cream.  Apply heat or cold PRN.      Therapies:  Discussed reffer to [facility] Psychology [facility] and he declined.    Refer for physical therapy [facility] - Discussed the importance of core strengthening, ROM, stretching exercises with the patient and how each of these entities is important in decreasing pain.  Explained to the patient that the purpose of physical therapy is to teach the patient a home exercise program.  These exercises need to be performed every day in order to decrease pain and prevent future occurrences of pain.        Discussed acupuncture and he will think about it.    Discussed Grounding Mat - handout provided - https://youtu.be/[email or web address]    Interventions:  Intercostal nerve block to treat L) rib pain.      Follow up: 2 wks to review lumbar imaging and recommendations.      VERONICA Allen, RN, CNP, FNP  [facility]        BILLING TIME DOCUMENTATION:   The total TIME spent on this patient on the date of the encounter/appointment was 70 minutes.            Answers for HPI/ROS submitted by the patient on 5/17/2023  YOCASTA 7 TOTAL SCORE: 0

## 2023-05-18 ENCOUNTER — OFFICE VISIT (OUTPATIENT)
Dept: PALLIATIVE MEDICINE | Facility: CLINIC | Age: 58
End: 2023-05-18
Attending: NURSE PRACTITIONER
Payer: COMMERCIAL

## 2023-05-18 VITALS
BODY MASS INDEX: 26.58 KG/M2 | SYSTOLIC BLOOD PRESSURE: 100 MMHG | HEART RATE: 80 BPM | WEIGHT: 175.4 LBS | DIASTOLIC BLOOD PRESSURE: 61 MMHG | OXYGEN SATURATION: 94 % | HEIGHT: 68 IN

## 2023-05-18 DIAGNOSIS — Z95.811 LEFT VENTRICULAR ASSIST DEVICE PRESENT (H): ICD-10-CM

## 2023-05-18 DIAGNOSIS — G89.29 CHRONIC INTRACTABLE PAIN: ICD-10-CM

## 2023-05-18 DIAGNOSIS — Z79.01 ANTICOAGULATED ON COUMADIN: ICD-10-CM

## 2023-05-18 DIAGNOSIS — F11.90 CHRONIC, CONTINUOUS USE OF OPIOIDS: ICD-10-CM

## 2023-05-18 DIAGNOSIS — G89.29 CHRONIC BILATERAL LOW BACK PAIN, UNSPECIFIED WHETHER SCIATICA PRESENT: Primary | ICD-10-CM

## 2023-05-18 DIAGNOSIS — M54.16 LUMBAR RADICULOPATHY: ICD-10-CM

## 2023-05-18 DIAGNOSIS — M54.50 CHRONIC BILATERAL LOW BACK PAIN, UNSPECIFIED WHETHER SCIATICA PRESENT: Primary | ICD-10-CM

## 2023-05-18 DIAGNOSIS — I42.8 CARDIOMYOPATHY, NONISCHEMIC (H): ICD-10-CM

## 2023-05-18 DIAGNOSIS — Z95.810 ICD (IMPLANTABLE CARDIOVERTER-DEFIBRILLATOR) IN PLACE: ICD-10-CM

## 2023-05-18 DIAGNOSIS — Z95.811 LVAD (LEFT VENTRICULAR ASSIST DEVICE) PRESENT (H): ICD-10-CM

## 2023-05-18 DIAGNOSIS — R07.81 RIB PAIN ON LEFT SIDE: ICD-10-CM

## 2023-05-18 PROCEDURE — 99205 OFFICE O/P NEW HI 60 MIN: CPT | Performed by: NURSE PRACTITIONER

## 2023-05-18 ASSESSMENT — PAIN SCALES - GENERAL: PAINLEVEL: WORST PAIN (10)

## 2023-05-18 NOTE — PROGRESS NOTES
PEG: A Three-Item Scale Assessing Pain Intensity and Interference    What number best describes your PAIN ON AVERAGE in the past week? 8    What number best describes how, during the past week, pain has interfered with your ENJOYMENT OF LIFE? 9    What number best describes how, during the past week, pain has interfered with your GENERAL ACTIVITY? 9    PEG Total Score: 8.67    Cassidy CANALES, Karan KA, Sedrick MJ, Sabino TA, Calvin J, Frankie JM, Yohana SM, Sukumar K. Development and initial validation of the PEG, a 3-item scale assessing pain intensity and interference. Journal of General Internal Medicine. 2009 Jun;24:733-738.    Answers for HPI/ROS submitted by the patient on 5/17/2023  YOCASTA 7 TOTAL SCORE: 0

## 2023-05-18 NOTE — PATIENT INSTRUCTIONS
Clinic Number:  [phone]   Call with any questions about your care and for scheduling assistance.   Calls are returned Monday through Friday between 8 AM and 4:30 PM. We usually get back to you within 2 business days depending on the issue/request.    If we are prescribing your medications:  For opioid medication refills, call the clinic or send a [employer] message 7 days in advance.  Please include:  Name of requested medication  Name of the pharmacy.  For non-opioid medications, call your pharmacy directly to request a refill. Please allow 3-4 days to be processed.   Per [place] State Law:  All controlled substance prescriptions must be filled within 30 days of being written.    For those controlled substances allowing refills, pickup must occur within 30 days of last fill.      We believe regular attendance is key to your success in our program!    Any time you are unable to keep your appointment we ask that you call us at least 24 hours in advance to cancel.This will allow us to offer the appointment time to another patient.   Multiple missed appointments may lead to dismissal from the clinic.    Medications:  Increase gabapentin by one 300mg capsule each week until taking gabapentin 300mg 2 capsules TID.  He does not tolerated muscle relaxants.    Oxycodone 5mg per PCP.  Will obtain UDS today.      The following OTC pain medications may be helpful, use as directed: Voltaren Gel 1% CBD products, Australian Dream Cream, Capsaicin products, Arnica cream  Lidocaine Patch, Solanpas, Biofreeze, Aspercream, Tiger Balm and Sandeep Emu cream.  Apply heat or cold PRN.    Therapies:  Discussed reffer to [facility] and he declined.    Discussed acupuncture and he will think about it.    Refer for physical therapy [facility] location - Discussed the importance of core strengthening, ROM, stretching exercises with the patient and how each of these entities is important in  decreasing pain.  Explained to the patient that the purpose of physical therapy is to teach the patient a home exercise program.  These exercises need to be performed every day in order to decrease pain and prevent future occurrences of pain.        Refer to [facility] acupuncture [facility]    Discussed Grounding Mat - handout provided - https://youtu.be/[email or web address]    Interventions:  Intercostal nerve block to treat L) rib pain.        Follow up: 2 wks to review lumbar imaging and recommendations.      VERONICA Allen, RN, CNP, FNP  [facility]

## 2023-05-19 ENCOUNTER — CARE COORDINATION (OUTPATIENT)
Dept: CARDIOLOGY | Facility: CLINIC | Age: 58
End: 2023-05-19
Payer: COMMERCIAL

## 2023-05-19 NOTE — PROGRESS NOTES
"Situation:   Writer spoke with Caregiver today to discuss alarm this AM.     Background:  Woke up to a very brief hazard alarm this AM while connected to MPU. When asked about how long the alarm was sounding, pt's spouse, Atul, notes that the alarm was short--\"less than five seconds\". Did not have time to see what alarm was on the screen at the time. Instructed pt to check the last six alarms on the controller. Last alarm read \"no external power\". Pt remained asymptomatic throughout alarm. Alarm self-resolved.     Assessment:  VAD parameters: Speed: 5200rpm's, Flow: 3.8l/min, PI: 5.7, Power: 3.4w  Pt's pump is running, VAD parameters are WNL, and pt does not endorse any symptoms. No current alarms noted. Asked if pt's battery charger was functioning as well, Atul noted that batteries went to yellow light very briefly, then back to green. When she takes the battery out and replaces it, battery remains green and fully functional.         Recommendation:  Asked pt and spouse to check that connections between MPU's and its power cord were tight, that cord was plugged fully into the wall, and that connection from pt's power cords to MPU were tight. Atul notes that each connection was tight and made correctly. Spoke with Atul regarding the possibility of a brief electrical surge that can affect the MPU's power conduction very briefly. Encouraged pt and spouse to page VAD coordinator on call with any additional questions, concerns, or change in patient condition. Pt and spouse expressed understanding of recommendation.         "

## 2023-05-19 NOTE — PROGRESS NOTES
Spoke with Landry regarding wound care order. Recommended that patient call [facility] directly to address why only 10 kits were sent in the order.     Offered to teach Landry how to do sterile dressing change with sensitive kit during a nurse visit. She declined this. Advised pt and Landry to call primary VAD coordinator on Monday if they have any further questions or concerns on how to properly use the sensitive kit.

## 2023-05-22 DIAGNOSIS — Z95.811 LVAD (LEFT VENTRICULAR ASSIST DEVICE) PRESENT (H): ICD-10-CM

## 2023-05-22 RX ORDER — GABAPENTIN 300 MG/1
600 CAPSULE ORAL 3 TIMES DAILY
Qty: 180 CAPSULE | Refills: 1 | Status: SHIPPED | OUTPATIENT
Start: 2023-05-22 | End: 2023-07-25

## 2023-05-22 RX ORDER — OXYCODONE HYDROCHLORIDE 5 MG/1
5 TABLET ORAL EVERY 8 HOURS PRN
Qty: 10 TABLET | Refills: 0 | Status: SHIPPED | OUTPATIENT
Start: 2023-05-22 | End: 2023-06-06

## 2023-05-22 NOTE — TELEPHONE ENCOUNTER
oxyCODONE (ROXICODONE) 5 MG tablet  Last Written Prescription Date:  5/11/2023  Last Fill Quantity: 10,   # refills: 0  Last Office Visit :  5/1/2023  Future Office visit:  None  Routing refill request to provider for review/approval because:  Drug not on the [facility], [facility] or [facility] refill protocol or controlled substance      gabapentin (NEURONTIN) 300 MG capsule  Last Written Prescription Date:  4/9/2023  Last Fill Quantity: 10,   # refills: 0  Last Office Visit :  5/1/2023  Future Office visit:  None  Routing refill request to provider for review/approval because:  Drug not on the [facility], [facility] or [facility] refill protocol or controlled substance      Camille Lafleur RN  Central Triage Red Flags/Med Refills                 with assist

## 2023-05-22 NOTE — TELEPHONE ENCOUNTER
[facility] Call Center    Phone Message    May a detailed message be left on voicemail: yes     Reason for Call: Medication Refill Request    Has the patient contacted the pharmacy for the refill? Yes   Name of medication being requested: Oxycodone & Gabapentin  Provider who prescribed the medication: Dr Estevez  Pharmacy:    [phone]    Date medication is needed: ASAP, needs until he can get back to Pain Clinic, 10 pills     Action Taken: Message routed to:  [facility]    Travel Screening: Not Applicable

## 2023-05-23 ENCOUNTER — CARE COORDINATION (OUTPATIENT)
Dept: CARDIOLOGY | Facility: CLINIC | Age: 58
End: 2023-05-23
Payer: COMMERCIAL

## 2023-05-23 DIAGNOSIS — Z95.811 LEFT VENTRICULAR ASSIST DEVICE PRESENT (H): ICD-10-CM

## 2023-05-23 DIAGNOSIS — I50.22 CHRONIC SYSTOLIC CONGESTIVE HEART FAILURE (H): ICD-10-CM

## 2023-05-23 DIAGNOSIS — I47.20 VENTRICULAR TACHYCARDIA (H): ICD-10-CM

## 2023-05-23 DIAGNOSIS — Z95.811 LVAD (LEFT VENTRICULAR ASSIST DEVICE) PRESENT (H): ICD-10-CM

## 2023-05-23 DIAGNOSIS — Z79.01 ANTICOAGULATED ON COUMADIN: ICD-10-CM

## 2023-05-23 DIAGNOSIS — Z79.899 LONG TERM USE OF DRUG: ICD-10-CM

## 2023-05-23 DIAGNOSIS — D50.8 OTHER IRON DEFICIENCY ANEMIA: Primary | ICD-10-CM

## 2023-05-23 PROBLEM — D50.9 ANEMIA, IRON DEFICIENCY: Status: ACTIVE | Noted: 2023-05-23

## 2023-05-23 RX ORDER — EPINEPHRINE 1 MG/ML
0.3 INJECTION, SOLUTION, CONCENTRATE INTRAVENOUS EVERY 5 MIN PRN
OUTPATIENT
Start: 2023-05-23

## 2023-05-23 RX ORDER — HEPARIN SODIUM (PORCINE) LOCK FLUSH IV SOLN 100 UNIT/ML 100 UNIT/ML
5 SOLUTION INTRAVENOUS
OUTPATIENT
Start: 2023-05-23

## 2023-05-23 RX ORDER — METHYLPREDNISOLONE SODIUM SUCCINATE 125 MG/2ML
125 INJECTION, POWDER, LYOPHILIZED, FOR SOLUTION INTRAMUSCULAR; INTRAVENOUS
Start: 2023-05-23

## 2023-05-23 RX ORDER — ALBUTEROL SULFATE 0.83 MG/ML
2.5 SOLUTION RESPIRATORY (INHALATION)
OUTPATIENT
Start: 2023-05-23

## 2023-05-23 RX ORDER — HEPARIN SODIUM,PORCINE 10 UNIT/ML
5 VIAL (ML) INTRAVENOUS
OUTPATIENT
Start: 2023-05-23

## 2023-05-23 RX ORDER — DIPHENHYDRAMINE HYDROCHLORIDE 50 MG/ML
50 INJECTION INTRAMUSCULAR; INTRAVENOUS
Start: 2023-05-23

## 2023-05-23 RX ORDER — ALBUTEROL SULFATE 90 UG/1
1-2 AEROSOL, METERED RESPIRATORY (INHALATION)
Start: 2023-05-23

## 2023-05-24 ENCOUNTER — LAB (OUTPATIENT)
Dept: LAB | Facility: CLINIC | Age: 58
End: 2023-05-24
Payer: COMMERCIAL

## 2023-05-24 ENCOUNTER — ANTICOAGULATION THERAPY VISIT (OUTPATIENT)
Dept: ANTICOAGULATION | Facility: CLINIC | Age: 58
End: 2023-05-24

## 2023-05-24 DIAGNOSIS — I50.23 ACUTE ON CHRONIC SYSTOLIC CONGESTIVE HEART FAILURE (H): Primary | ICD-10-CM

## 2023-05-24 DIAGNOSIS — Z95.811 LEFT VENTRICULAR ASSIST DEVICE PRESENT (H): ICD-10-CM

## 2023-05-24 DIAGNOSIS — Z79.01 ANTICOAGULATED ON COUMADIN: ICD-10-CM

## 2023-05-24 DIAGNOSIS — Z79.899 LONG TERM USE OF DRUG: ICD-10-CM

## 2023-05-24 DIAGNOSIS — G89.29 CHRONIC INTRACTABLE PAIN: ICD-10-CM

## 2023-05-24 DIAGNOSIS — I50.22 CHRONIC SYSTOLIC CONGESTIVE HEART FAILURE (H): ICD-10-CM

## 2023-05-24 DIAGNOSIS — F11.90 CHRONIC, CONTINUOUS USE OF OPIOIDS: ICD-10-CM

## 2023-05-24 DIAGNOSIS — I50.22 CHRONIC SYSTOLIC (CONGESTIVE) HEART FAILURE (H): ICD-10-CM

## 2023-05-24 DIAGNOSIS — Z95.811 LVAD (LEFT VENTRICULAR ASSIST DEVICE) PRESENT (H): ICD-10-CM

## 2023-05-24 LAB
INR PPP: 2.85 (ref 0.85–1.15)
LDH SERPL L TO P-CCNC: 295 U/L (ref 0–250)

## 2023-05-24 PROCEDURE — 83615 LACTATE (LD) (LDH) ENZYME: CPT | Performed by: PATHOLOGY

## 2023-05-24 PROCEDURE — 36415 COLL VENOUS BLD VENIPUNCTURE: CPT | Performed by: PATHOLOGY

## 2023-05-24 PROCEDURE — 85610 PROTHROMBIN TIME: CPT | Performed by: PATHOLOGY

## 2023-05-24 NOTE — PROGRESS NOTES
ANTICOAGULATION MANAGEMENT     Akshat Fragoso 58 year old male is on warfarin with therapeutic INR result. (Goal INR 2.0-3.0)    Recent labs: (last 7 days)     05/24/23  1508   INR 2.85*       ASSESSMENT       Source(s): Chart Review    Previous INR was Therapeutic last visit; previously outside of goal range    Medication, diet, health changes since last INR chart reviewed; none identified             PLAN     Recommended plan for no diet, medication or health factor changes affecting INR     Dosing Instructions: Continue your current warfarin dose with next INR in 1 week       Summary  As of 5/24/2023    Full warfarin instructions:  2.5 mg every Tue, Fri; 5 mg all other days   Next INR check:  5/31/2023             Detailed voice message left for Akshat with dosing instructions and follow up date.   Sent [employer] message with dosing and follow up instructions    Contact [phone] to schedule and with any changes, questions or concerns.     Education provided:     Please call back if any changes to your diet, medications or how you've been taking warfarin    Symptom monitoring: monitoring for bleeding signs and symptoms    Contact [phone] with any changes, questions or concerns.     Plan made per [facility] anticoagulation protocol and per LVAD protocol    TREV LYN RN  Anticoagulation Clinic  5/24/2023    _______________________________________________________________________     Anticoagulation Episode Summary     Current INR goal:  2.0-3.0   TTR:  44.0 % (1.2 mo)   Target end date:  Indefinite   Send INR reminders to:  ANTICOAG LVAD    Indications    Acute on chronic systolic congestive heart failure (H) [I50.23]  LVAD (left ventricular assist device) present (H) [Z95.811]  Chronic systolic congestive heart failure (H) [I50.22]  Long term use of drug [Z79.899]  Left ventricular assist device present (H) [Z95.811]  Anticoagulated on Coumadin [Z79.01]           Comments:  Follow VAD Anticoag protocol:Yes:  HeartMate 3   Bridging: Enoxaparin   Date VAD placed: 3/23/23         Anticoagulation Care Providers     Provider Role Specialty Phone number    Kenzie Moreau MD Referring Advanced Heart Failure and Transplant Cardiology [phone]    Mile Bolivar, APRN CNP Referring Nurse Practitioner [phone]    Shaun Aguiar MD Referring Cardiovascular Disease [phone]

## 2023-05-25 ENCOUNTER — CARE COORDINATION (OUTPATIENT)
Dept: CARDIOLOGY | Facility: CLINIC | Age: 58
End: 2023-05-25
Payer: COMMERCIAL

## 2023-05-25 ENCOUNTER — HOSPITAL ENCOUNTER (EMERGENCY)
Facility: CLINIC | Age: 58
Discharge: HOME | End: 2023-05-26
Attending: EMERGENCY MEDICINE | Admitting: EMERGENCY MEDICINE
Payer: COMMERCIAL

## 2023-05-25 DIAGNOSIS — R42 DIZZINESS AND GIDDINESS: ICD-10-CM

## 2023-05-25 DIAGNOSIS — R29.810 FACIAL WEAKNESS: ICD-10-CM

## 2023-05-25 PROCEDURE — 99284 EMERGENCY DEPT VISIT MOD MDM: CPT

## 2023-05-25 PROCEDURE — 93010 ELECTROCARDIOGRAM REPORT: CPT | Performed by: EMERGENCY MEDICINE

## 2023-05-25 PROCEDURE — 93005 ELECTROCARDIOGRAM TRACING: CPT

## 2023-05-25 PROCEDURE — 99284 EMERGENCY DEPT VISIT MOD MDM: CPT | Mod: 25 | Performed by: EMERGENCY MEDICINE

## 2023-05-26 ENCOUNTER — CARE COORDINATION (OUTPATIENT)
Dept: CARDIOLOGY | Facility: CLINIC | Age: 58
End: 2023-05-26

## 2023-05-26 ENCOUNTER — DOCUMENTATION ONLY (OUTPATIENT)
Dept: ANTICOAGULATION | Facility: CLINIC | Age: 58
End: 2023-05-26

## 2023-05-26 VITALS
TEMPERATURE: 98.1 F | HEART RATE: 69 BPM | BODY MASS INDEX: 25.9 KG/M2 | HEIGHT: 67 IN | WEIGHT: 165 LBS | OXYGEN SATURATION: 100 % | RESPIRATION RATE: 18 BRPM

## 2023-05-26 LAB
ALBUMIN SERPL BCG-MCNC: 4.3 G/DL (ref 3.5–5.2)
ALP SERPL-CCNC: 116 U/L (ref 40–129)
ALT SERPL W P-5'-P-CCNC: 31 U/L (ref 10–50)
ANION GAP SERPL CALCULATED.3IONS-SCNC: 13 MMOL/L (ref 7–15)
APTT PPP: 40 SECONDS (ref 22–38)
AST SERPL W P-5'-P-CCNC: 30 U/L (ref 10–50)
ATRIAL RATE - MUSE: 0 BPM
BASOPHILS # BLD AUTO: 0.1 10E3/UL (ref 0–0.2)
BASOPHILS NFR BLD AUTO: 1 %
BILIRUB SERPL-MCNC: 0.4 MG/DL
BUN SERPL-MCNC: 23.7 MG/DL (ref 6–20)
CALCIUM SERPL-MCNC: 9.2 MG/DL (ref 8.6–10)
CHLORIDE SERPL-SCNC: 98 MMOL/L (ref 98–107)
CREAT BLD-MCNC: 1.7 MG/DL (ref 0.7–1.3)
CREAT SERPL-MCNC: 1.64 MG/DL (ref 0.67–1.17)
DEPRECATED HCO3 PLAS-SCNC: 24 MMOL/L (ref 22–29)
DIASTOLIC BLOOD PRESSURE - MUSE: NORMAL MMHG
EOSINOPHIL # BLD AUTO: 0.2 10E3/UL (ref 0–0.7)
EOSINOPHIL NFR BLD AUTO: 1 %
ERYTHROCYTE [DISTWIDTH] IN BLOOD BY AUTOMATED COUNT: 15.4 % (ref 10–15)
GFR SERPL CREATININE-BSD FRML MDRD: 47 ML/MIN/1.73M2
GFR SERPL CREATININE-BSD FRML MDRD: 49 ML/MIN/1.73M2
GLUCOSE SERPL-MCNC: 112 MG/DL (ref 70–99)
HCT VFR BLD AUTO: 41.4 % (ref 40–53)
HGB BLD-MCNC: 12.3 G/DL (ref 13.3–17.7)
IMM GRANULOCYTES # BLD: 0.2 10E3/UL
IMM GRANULOCYTES NFR BLD: 1 %
INR PPP: 2.63 (ref 0.85–1.15)
INTERPRETATION ECG - MUSE: NORMAL
LDH SERPL L TO P-CCNC: 286 U/L (ref 0–250)
LYMPHOCYTES # BLD AUTO: 1.4 10E3/UL (ref 0.8–5.3)
LYMPHOCYTES NFR BLD AUTO: 8 %
MAGNESIUM SERPL-MCNC: 2.1 MG/DL (ref 1.7–2.3)
MCH RBC QN AUTO: 28 PG (ref 26.5–33)
MCHC RBC AUTO-ENTMCNC: 29.7 G/DL (ref 31.5–36.5)
MCV RBC AUTO: 94 FL (ref 78–100)
MONOCYTES # BLD AUTO: 1.4 10E3/UL (ref 0–1.3)
MONOCYTES NFR BLD AUTO: 8 %
NEUTROPHILS # BLD AUTO: 14.8 10E3/UL (ref 1.6–8.3)
NEUTROPHILS NFR BLD AUTO: 81 %
NRBC # BLD AUTO: 0 10E3/UL
NRBC BLD AUTO-RTO: 0 /100
P AXIS - MUSE: NORMAL DEGREES
PLATELET # BLD AUTO: 272 10E3/UL (ref 150–450)
POTASSIUM SERPL-SCNC: 4.5 MMOL/L (ref 3.4–5.3)
PR INTERVAL - MUSE: NORMAL MS
PROT SERPL-MCNC: 7.1 G/DL (ref 6.4–8.3)
QRS DURATION - MUSE: 112 MS
QT - MUSE: 378 MS
QTC - MUSE: 446 MS
R AXIS - MUSE: 254 DEGREES
RBC # BLD AUTO: 4.39 10E6/UL (ref 4.4–5.9)
SODIUM SERPL-SCNC: 135 MMOL/L (ref 136–145)
SYSTOLIC BLOOD PRESSURE - MUSE: NORMAL MMHG
T AXIS - MUSE: 38 DEGREES
VENTRICULAR RATE- MUSE: 84 BPM
WBC # BLD AUTO: 18.1 10E3/UL (ref 4–11)

## 2023-05-26 PROCEDURE — 83615 LACTATE (LD) (LDH) ENZYME: CPT

## 2023-05-26 PROCEDURE — 80053 COMPREHEN METABOLIC PANEL: CPT

## 2023-05-26 PROCEDURE — 85025 COMPLETE CBC W/AUTO DIFF WBC: CPT

## 2023-05-26 PROCEDURE — 83735 ASSAY OF MAGNESIUM: CPT

## 2023-05-26 PROCEDURE — 36415 COLL VENOUS BLD VENIPUNCTURE: CPT

## 2023-05-26 PROCEDURE — 82565 ASSAY OF CREATININE: CPT | Mod: 91

## 2023-05-26 PROCEDURE — 85610 PROTHROMBIN TIME: CPT

## 2023-05-26 PROCEDURE — 85730 THROMBOPLASTIN TIME PARTIAL: CPT

## 2023-05-26 RX ORDER — IOPAMIDOL 755 MG/ML
75 INJECTION, SOLUTION INTRAVASCULAR ONCE
Status: DISCONTINUED | OUTPATIENT
Start: 2023-05-26 | End: 2023-05-26 | Stop reason: HOSPADM

## 2023-05-26 ASSESSMENT — ACTIVITIES OF DAILY LIVING (ADL): ADLS_ACUITY_SCORE: 35

## 2023-05-26 NOTE — ED PROVIDER NOTES
[facility] EMERGENCY DEPARTMENT ([facility])    5/25/23       ED PROVIDER NOTE     History     Chief Complaint   Patient presents with     Dizziness     Pt 6 weeks post op LVAD. See triage note.      HPI  Akshat Fragoso is a 58 year old male with history of CAD, VT/VF cardiac arrest, cardiomyopathy, systolic heart failure with EF of 10% s/p LVAD placement on 3/23/2023 on warfarin who presents to the emergency department with brief episode of vision changes to his right eye approximately 2.5 hours ago. Patient reports he was shaving tonight around 9:45pm when his right eye suddenly developed blurry and double vision. Patient reports he was able to move the eye, but was unable to blink/close the eye. The episode lasted ~5 minutes. Patient denies prior head injury, headache, trouble speaking, facial droop, gait changes, lightheadedness, dizziness, chest pain, or shortness of breath. Patient denies drainage from the eye. Patient denies history of stroke.    Past Medical History  Past Medical History:   Diagnosis Date     Acute right lumbar radiculopathy 11/02/2015     Acute systolic heart failure (H) 01/10/2017     Cardiomyopathy (H) 01/10/2017     CKD (chronic kidney disease) stage 3, GFR 30-59 ml/min (H) 01/30/2020     JOHNSON (dyspnea on exertion)      ED (erectile dysfunction) 10/02/2019     Erectile dysfunction      ICD (implantable cardioverter-defibrillator) in place- Fanbouts, single chamber- NOT dependent 10/09/2017     Personal history of smoking 01/04/2017     Pulmonary nodules 01/17/2017    CT 11/2018:  Bilateral pulmonary nodules measuring up to 4 mm. No new or enlarging pulmonary nodules.     Past Surgical History:   Procedure Laterality Date     CARDIAC SURGERY  2016    defibrillator placement     COLONOSCOPY N/A 3/22/2023    Procedure: Colonoscopy;  Surgeon: Khushboo Crespo MD;  Location: [facility]     CV INTRA AORTIC BALLOON N/A 3/22/2023    Procedure: Intraprocedure Aortic  Balloon Pump Insertion;  Surgeon: Danie Snyder MD;  Location:  HEART CARDIAC CATH LAB     CV RIGHT HEART CATH MEASUREMENTS RECORDED N/A 11/20/2019    Procedure: CV RIGHT HEART CATH;  Surgeon: Jeff Aguilar MD;  Location:  HEART CARDIAC CATH LAB     CV RIGHT HEART CATH MEASUREMENTS RECORDED N/A 3/22/2023    Procedure: Right Heart Catheterization;  Surgeon: Danie Snyder MD;  Location:  HEART CARDIAC CATH LAB     INSERT VENTRICULAR ASSIST DEVICE LEFT (HEARTMATE II) N/A 3/23/2023    Procedure: MEDIAN STERNOTOMY, TRANSESOPHAGEAL ECHOCARDIOGRAM PER ANESTHESIA, CARDIOPULMONARY BYPASS PUMP, INSERTION, LEFT VENTRICULAR ASSIST DEVICE (HEARTMATE IIl), TRICUSPID VALVE REPAIR WITH EDWRDS 32 MM MC3 TRICUSPID ANNULOPLASTY RING;  Surgeon: Elena Matias MD;  Location:  OR     None       acetaminophen (TYLENOL) 325 MG tablet  amiodarone (PACERONE) 200 MG tablet  amoxicillin (AMOXIL) 500 MG tablet  aspirin (ASA) 81 MG chewable tablet  bumetanide (BUMEX) 1 MG tablet  digoxin (LANOXIN) 250 MCG tablet  empagliflozin (JARDIANCE) 10 MG TABS tablet  gabapentin (NEURONTIN) 300 MG capsule  lisinopril (ZESTRIL) 5 MG tablet  methocarbamol (ROBAXIN) 500 MG tablet  nystatin (MYCOSTATIN) 344672 UNIT/ML suspension  oxyCODONE (ROXICODONE) 5 MG tablet  pantoprazole (PROTONIX) 20 MG EC tablet  polyethylene glycol (MIRALAX) 17 GM/Dose powder  senna-docusate (SENOKOT-S/PERICOLACE) 8.6-50 MG tablet  spironolactone (ALDACTONE) 25 MG tablet  warfarin ANTICOAGULANT (COUMADIN) 5 MG tablet      Allergies   Allergen Reactions     Codeine      Other reaction(s): GI intolerance, Intolerance-Can't Take     Family History  Family History   Problem Relation Age of Onset     Parkinsonism Mother      Atrial fibrillation Father      Heart Failure Father      Prostate Cancer Father      Skin Cancer Father      Anorexia nervosa Daughter      Other - See Comments Granddaughter         premature birth     Social History    Social History     Tobacco Use     Smoking status: Former     Packs/day: 0.25     Years: 15.00     Pack years: 3.75     Types: Cigarettes     Smokeless tobacco: Former     Quit date: 3/15/2023   Vaping Use     Vaping status: Never Used   Substance Use Topics     Alcohol use: No     Alcohol/week: 0.0 standard drinks of alcohol     Drug use: No      Past medical history, past surgical history, medications, allergies, family history, and social history were reviewed with the patient. No additional pertinent items.      A medically appropriate review of systems was performed with pertinent positives and negatives noted in the HPI, and all other systems negative.    Physical Exam      Physical Exam  Physical Exam   Constitutional: oriented to person, place, and time. appears well-developed and well-nourished.   HENT:   Head: Normocephalic and atraumatic.   Neck: Normal range of motion.   Pulmonary/Chest: Effort normal. No respiratory distress.   Cardiac: No murmurs, rubs, gallops. RRR.  Abdominal: Abdomen soft, nontender, nondistended. No rebound tenderness.  MSK: Long bones without deformity or evidence of trauma  Neurological: alert and oriented to person, place, and time. Stable gait.  Finger-nose-finger normal bilaterally.  Heel-to-shin normal bilaterally.  Cranial nerves II through XII normal.  Pupils doing lesion react to light bilaterally.  No nystagmus.  Skin: Skin is warm and dry.   Psychiatric:  normal mood and affect.  behavior is normal. Thought content normal.       ED Course, Procedures, & Data      Procedures       ED Course Selections:        EKG Interpretation:      Interpreted by Ming Asencio MD  Time reviewed: 0030  Symptoms at time of EKG: dizziness   Rhythm: paced  Rate: Normal  Axis: Left Axis Deviation  Ectopy: none  Conduction: normal  ST Segments/ T Waves: No acute ischemic changes  Q Waves: nonspecific  Comparison to prior: Unchanged    Clinical Impression: Difficult to assess due to  LVAD, no apparent changes from prior EKG                           No results found for any visits on 05/25/23.  Medications - No data to display  Labs Ordered and Resulted from Time of ED Arrival to Time of ED Departure - No data to display  No orders to display          Critical care was not performed.     Medical Decision Making  The patient's presentation was of high complexity (an acute health issue posing potential threat to life or bodily function).    The patient's evaluation involved:  review of external note(s) from 1 sources (prior nurse note)  ordering and/or review of 3+ test(s) in this encounter (see separate area of note for details)    The patient's management necessitated high risk (a decision regarding hospitalization).      Assessment & Plan    MDM  Patient presenting with symptoms concerning for TIA.  The patient is asymptomatic at baseline so a stroke code was not called.  He has a normal neurologic exam.  His INR is appropriately elevated however I am concerned about the history.  Patient is unwilling to stay for a CT scan because he does not want to wait any longer.  He states he will discuss this with his heart team and schedule CT in the morning.  Discussed this may be complicated and timing is of the essence.  We discussed risks and benefits including threat to life and limb if we do not get further studies.  I did discuss my concerns.  He is able to understand this and repeat this.  He has a capacity to make this decision.  His wife also understands.  Discussed return precautions and they will follow-up.    I have reviewed the nursing notes. I have reviewed the findings, diagnosis, plan and need for follow up with the patient.    New Prescriptions    No medications on file       Final diagnoses:   Facial weakness     Scribe Disclosure:  By signing my name below, I, Meg Guzman, attest that this documentation has been prepared under the direction of Ming Asencio MD,based on data  collection and the provider's statements to me.  Electronically Signed: Uma Rollins.     Provider Disclosure:  I, Ming Asencio MD, have reviewed the content of the documentation as recorded by the scribe and have made edits where needed. I have personally performed the services documented here and attest that the documentation accurately represents the decisions I have made.    Ming Asencio MD    [facility] EMERGENCY DEPARTMENT  5/25/2023     Ming Asencio MD  05/26/23 0159

## 2023-05-26 NOTE — PROGRESS NOTES
D:  Pt called to report vision changes to R eye.  Reports it would not focus and felt like it was not blinking.  Lasted about 5 minutes.  Has since resolved.  Pt denies HA, dizziness.  No numbness and tingling.  I:  Instructed pt to go to ED for head CT.  A:  Pt verbalized understanding.  P:  Update to [facility].

## 2023-05-26 NOTE — ED TRIAGE NOTES
"Pt presents to ED ambulating independently with family. Pt reports \"while I was shaving tonight around 9:45p my vision went really weird in my right eye & my depth perception was off. Only lasted about 5 minutes, but I'm 6 weeks post having my LVAD placed so I'm supposed to call my VAD coordinator for anything abnormal & they told me to come in.\" Pt has no c/o at this current time. Denies pain. A&Ox4. Neuros & CMS intact. Afebrile.      Triage Assessment     Row Name 05/25/23 0999       Triage Assessment (Adult)    Airway WDL WDL       Respiratory WDL    Respiratory WDL WDL       Skin Circulation/Temperature WDL    Skin Circulation/Temperature WDL WDL       Cardiac WDL    Cardiac WDL WDL       Peripheral/Neurovascular WDL    Peripheral Neurovascular WDL WDL       Cognitive/Neuro/Behavioral WDL    Cognitive/Neuro/Behavioral WDL WDL              "

## 2023-05-26 NOTE — PROGRESS NOTES
ANTICOAGULATION  MANAGEMENT: Discharge Review    Akshat Fragoso chart reviewed for anticoagulation continuity of care    Emergency room visit on 5/24-23 for blurred vision/dizziness.    Discharge disposition: Home--AMA    Results:    Recent labs: (last 7 days)     05/24/23  1508 05/26/23  0006   INR 2.85* 2.63*     Anticoagulation inpatient management:     not applicable .     Anticoagulation discharge instructions:     Warfarin dosing: home regimen continued   Bridging: No   INR goal change: No      Medication changes affecting anticoagulation: No    Additional factors affecting anticoagulation: No     PLAN     No adjustment to anticoagulation plan needed.  INR was 2.63 in ER.  Akshat had INR checked 5/24/23 and was given warfarin dosing--no change needed to this plan.  He is due for next INR 5/31/23.    Patient not contacted    No adjustment to Anticoagulation Calendar was required    Yessenia Mcfarland RN

## 2023-05-26 NOTE — DISCHARGE INSTRUCTIONS
You need to return to the emergency department if your symptoms worsen or if you have any further concerns    I am concerned he possibly had was called a TIA or a mini stroke.  This can lead to limb or life-threatening problems if not addressed.

## 2023-05-26 NOTE — PROGRESS NOTES
D:  Received call from patient's wife.  States pt went to ED last night but did not want to wait for a CT scan.  Requesting order for outpatient CT.  I:  Explained that a CT to r/o stroke must be done in the ED as there is not an associated provider to read the results and recommend a treatment.  Advised pt to go to ED  A:  Pt's wife verbalized understanding.  Reports the patient is doing fine and will go if needed.  P:  VAD coordinator available for questions or concerns.

## 2023-05-30 DIAGNOSIS — Z95.811 LVAD (LEFT VENTRICULAR ASSIST DEVICE) PRESENT (H): ICD-10-CM

## 2023-05-30 RX ORDER — PANTOPRAZOLE SODIUM 20 MG/1
20 TABLET, DELAYED RELEASE ORAL
Qty: 30 TABLET | Refills: 0 | Status: SHIPPED | OUTPATIENT
Start: 2023-05-30 | End: 2023-07-05

## 2023-05-31 ENCOUNTER — LAB (OUTPATIENT)
Dept: LAB | Facility: CLINIC | Age: 58
End: 2023-05-31
Payer: COMMERCIAL

## 2023-05-31 ENCOUNTER — ANTICOAGULATION THERAPY VISIT (OUTPATIENT)
Dept: ANTICOAGULATION | Facility: CLINIC | Age: 58
End: 2023-05-31

## 2023-05-31 DIAGNOSIS — Z79.01 ANTICOAGULATED ON COUMADIN: ICD-10-CM

## 2023-05-31 DIAGNOSIS — Z95.811 LEFT VENTRICULAR ASSIST DEVICE PRESENT (H): ICD-10-CM

## 2023-05-31 DIAGNOSIS — I50.22 CHRONIC SYSTOLIC CONGESTIVE HEART FAILURE (H): ICD-10-CM

## 2023-05-31 DIAGNOSIS — Z95.811 LVAD (LEFT VENTRICULAR ASSIST DEVICE) PRESENT (H): ICD-10-CM

## 2023-05-31 DIAGNOSIS — F11.90 CHRONIC, CONTINUOUS USE OF OPIOIDS: ICD-10-CM

## 2023-05-31 DIAGNOSIS — I50.23 ACUTE ON CHRONIC SYSTOLIC CONGESTIVE HEART FAILURE (H): Primary | ICD-10-CM

## 2023-05-31 DIAGNOSIS — Z79.899 LONG TERM USE OF DRUG: ICD-10-CM

## 2023-05-31 DIAGNOSIS — G89.29 CHRONIC INTRACTABLE PAIN: ICD-10-CM

## 2023-05-31 LAB
CANNABINOIDS UR QL SCN: NORMAL
CREAT UR-MCNC: 126 MG/DL
ETHANOL UR QL SCN: NORMAL
INR PPP: 2.85 (ref 0.85–1.15)

## 2023-05-31 PROCEDURE — 80307 DRUG TEST PRSMV CHEM ANLYZR: CPT | Mod: 90 | Performed by: PATHOLOGY

## 2023-05-31 PROCEDURE — 80355 GABAPENTIN NON-BLOOD: CPT | Mod: 90 | Performed by: PATHOLOGY

## 2023-05-31 PROCEDURE — 85610 PROTHROMBIN TIME: CPT | Performed by: PATHOLOGY

## 2023-05-31 PROCEDURE — 36415 COLL VENOUS BLD VENIPUNCTURE: CPT | Performed by: PATHOLOGY

## 2023-05-31 PROCEDURE — 99000 SPECIMEN HANDLING OFFICE-LAB: CPT | Performed by: PATHOLOGY

## 2023-06-01 DIAGNOSIS — Z95.811 LVAD (LEFT VENTRICULAR ASSIST DEVICE) PRESENT (H): ICD-10-CM

## 2023-06-01 RX ORDER — WARFARIN SODIUM 5 MG/1
TABLET ORAL
Qty: 80 TABLET | Refills: 1 | Status: SHIPPED | OUTPATIENT
Start: 2023-06-01 | End: 2023-08-17

## 2023-06-01 NOTE — TELEPHONE ENCOUNTER
ANTICOAGULATION MANAGEMENT:  Medication Refill    Anticoagulation Summary  As of 5/31/2023    Warfarin maintenance plan:  2.5 mg (5 mg x 0.5) every Tue, Fri; 5 mg (5 mg x 1) all other days   Next INR check:  6/7/2023   Target end date:  Indefinite    Indications    Acute on chronic systolic congestive heart failure (H) [I50.23]  LVAD (left ventricular assist device) present (H) [Z95.811]  Chronic systolic congestive heart failure (H) [I50.22]  Long term use of drug [Z79.899]  Left ventricular assist device present (H) [Z95.811]  Anticoagulated on Coumadin [Z79.01]             Anticoagulation Care Providers     Provider Role Specialty Phone number    Kenzie Moreau MD Referring Advanced Heart Failure and Transplant Cardiology [phone]    Mile Bolivar APRN CNP Referring Nurse Practitioner [phone]    Shaun Aguiar MD Referring Cardiovascular Disease [phone]          Visit with referring provider/group within last year: Yes    [facility] referral signed within last year: Yes    Akshat meets all criteria for refill (current [facility] referral, office visit with referring provider/group in last year, lab monitoring up to date or not exceeding 2 weeks overdue). Rx instructions and quantity supplied updated to match patient's current dosing plan. Warfarin 90 day supply with 1 refill granted per [facility] protocol     Yessenia Mcfarland RN  Anticoagulation Clinic

## 2023-06-02 ENCOUNTER — TELEPHONE (OUTPATIENT)
Dept: INTERNAL MEDICINE | Facility: CLINIC | Age: 58
End: 2023-06-02
Payer: COMMERCIAL

## 2023-06-02 DIAGNOSIS — I50.22 CHRONIC SYSTOLIC CONGESTIVE HEART FAILURE (H): ICD-10-CM

## 2023-06-02 DIAGNOSIS — Z95.811 LEFT VENTRICULAR ASSIST DEVICE PRESENT (H): ICD-10-CM

## 2023-06-02 DIAGNOSIS — Z95.811 LVAD (LEFT VENTRICULAR ASSIST DEVICE) PRESENT (H): ICD-10-CM

## 2023-06-02 DIAGNOSIS — Z79.899 LONG TERM USE OF DRUG: ICD-10-CM

## 2023-06-02 LAB
GABAPENTIN UR QL CFM: PRESENT
OXYCODONE UR CFM-MCNC: 1980 NG/ML
OXYCODONE/CREAT UR: 1571 NG/MG {CREAT}
OXYMORPHONE UR CFM-MCNC: 680 NG/ML
OXYMORPHONE/CREAT UR: 540 NG/MG {CREAT}

## 2023-06-02 NOTE — TELEPHONE ENCOUNTER
[facility] Call Center    Phone Message    May a detailed message be left on voicemail: yes     Reason for Call: Medication Question or concern regarding medication   Prescription Clarification  Name of Medication: Oxycodone- 10 tabs  Prescribing Provider: Dr. Daniels   Pharmacy:    [place], [place] - [address] [place] [address] [phone]     What on the order needs clarification? Patient states he has PT appointments and a pain clinic appointment next week and is requesting 1 more refill on this medication before then.       Action Taken: Message routed to:  [facility]    Travel Screening: Not Applicable

## 2023-06-06 RX ORDER — OXYCODONE HYDROCHLORIDE 5 MG/1
5 TABLET ORAL EVERY 8 HOURS PRN
Qty: 10 TABLET | Refills: 0 | Status: SHIPPED | OUTPATIENT
Start: 2023-06-06 | End: 2023-06-12

## 2023-06-06 NOTE — TELEPHONE ENCOUNTER
"oxyCODONE (ROXICODONE) 5 MG tablet   Last Written Prescription Date:  5/22/23  Last Fill Quantity: 10,   # refills: 0   Last Office Visit :  5/1/2023  Future Office visit:  None  Routing refill request to provider for review/approval because:  Controlled substance  \"Patient states he has PT appointments and a pain clinic appointment next week and is requesting 1 more refill on this medication before then. \"       Provider who prescribed the medication: Darian Estevez MD  Pharmacy: [phone]  "

## 2023-06-06 NOTE — TELEPHONE ENCOUNTER
[facility] Call Center    Phone Message    May a detailed message be left on voicemail: yes     Reason for Call: Medication Refill Request    Has the patient contacted the pharmacy for the refill? Yes   Name of medication being requested: oxyCODONE (ROXICODONE) 5 MG tablet  Provider who prescribed the medication: Darian Estevez MD  Pharmacy: [phone]  Date medication is needed: asap   The patient was needing a refill on this medication as well, please review and follow up with patient on refill updates thank you.      Action Taken: Message routed to:  [facility]    Travel Screening: Not Applicable

## 2023-06-06 NOTE — TELEPHONE ENCOUNTER
This is Dr. Sommer covering for Dr. Estevez this week. I will send oxycodone 10 tablets for now for patient.       Barney Sommer MD  Internal Medicine  [facility], [place], [place]

## 2023-06-07 ENCOUNTER — OFFICE VISIT (OUTPATIENT)
Dept: CARDIOLOGY | Facility: CLINIC | Age: 58
End: 2023-06-07
Attending: PHYSICIAN ASSISTANT
Payer: COMMERCIAL

## 2023-06-07 ENCOUNTER — LAB (OUTPATIENT)
Dept: LAB | Facility: CLINIC | Age: 58
End: 2023-06-07
Payer: COMMERCIAL

## 2023-06-07 ENCOUNTER — ANTICOAGULATION THERAPY VISIT (OUTPATIENT)
Dept: ANTICOAGULATION | Facility: CLINIC | Age: 58
End: 2023-06-07

## 2023-06-07 VITALS — WEIGHT: 175.1 LBS | OXYGEN SATURATION: 96 % | SYSTOLIC BLOOD PRESSURE: 74 MMHG | BODY MASS INDEX: 27.42 KG/M2

## 2023-06-07 DIAGNOSIS — Z79.01 ANTICOAGULATED ON COUMADIN: ICD-10-CM

## 2023-06-07 DIAGNOSIS — Z95.811 LEFT VENTRICULAR ASSIST DEVICE PRESENT (H): ICD-10-CM

## 2023-06-07 DIAGNOSIS — I50.23 ACUTE ON CHRONIC SYSTOLIC CONGESTIVE HEART FAILURE (H): Primary | ICD-10-CM

## 2023-06-07 DIAGNOSIS — I50.20 HEART FAILURE WITH REDUCED EJECTION FRACTION, NYHA CLASS III (H): ICD-10-CM

## 2023-06-07 DIAGNOSIS — Z95.811 LVAD (LEFT VENTRICULAR ASSIST DEVICE) PRESENT (H): ICD-10-CM

## 2023-06-07 DIAGNOSIS — I50.22 CHRONIC SYSTOLIC CONGESTIVE HEART FAILURE (H): ICD-10-CM

## 2023-06-07 DIAGNOSIS — Z79.899 LONG TERM USE OF DRUG: ICD-10-CM

## 2023-06-07 LAB
ALBUMIN SERPL BCG-MCNC: 4.4 G/DL (ref 3.5–5.2)
ALP SERPL-CCNC: 136 U/L (ref 40–129)
ALT SERPL W P-5'-P-CCNC: 40 U/L (ref 10–50)
ANION GAP SERPL CALCULATED.3IONS-SCNC: 11 MMOL/L (ref 7–15)
AST SERPL W P-5'-P-CCNC: 36 U/L (ref 10–50)
BASOPHILS # BLD AUTO: 0.1 10E3/UL (ref 0–0.2)
BASOPHILS NFR BLD AUTO: 1 %
BILIRUB SERPL-MCNC: 0.5 MG/DL
BUN SERPL-MCNC: 17.6 MG/DL (ref 6–20)
CALCIUM SERPL-MCNC: 9.7 MG/DL (ref 8.6–10)
CHLORIDE SERPL-SCNC: 98 MMOL/L (ref 98–107)
CREAT SERPL-MCNC: 1.1 MG/DL (ref 0.67–1.17)
DEPRECATED HCO3 PLAS-SCNC: 26 MMOL/L (ref 22–29)
EOSINOPHIL # BLD AUTO: 0.5 10E3/UL (ref 0–0.7)
EOSINOPHIL NFR BLD AUTO: 3 %
ERYTHROCYTE [DISTWIDTH] IN BLOOD BY AUTOMATED COUNT: 15.2 % (ref 10–15)
GFR SERPL CREATININE-BSD FRML MDRD: 79 ML/MIN/1.73M2
GLUCOSE SERPL-MCNC: 146 MG/DL (ref 70–99)
HCT VFR BLD AUTO: 44.4 % (ref 40–53)
HGB BLD-MCNC: 13.8 G/DL (ref 13.3–17.7)
IMM GRANULOCYTES # BLD: 0.2 10E3/UL
IMM GRANULOCYTES NFR BLD: 1 %
INR PPP: 2.34 (ref 0.85–1.15)
LDH SERPL L TO P-CCNC: 256 U/L (ref 0–250)
LYMPHOCYTES # BLD AUTO: 3 10E3/UL (ref 0.8–5.3)
LYMPHOCYTES NFR BLD AUTO: 19 %
MCH RBC QN AUTO: 27.9 PG (ref 26.5–33)
MCHC RBC AUTO-ENTMCNC: 31.1 G/DL (ref 31.5–36.5)
MCV RBC AUTO: 90 FL (ref 78–100)
MONOCYTES # BLD AUTO: 1.1 10E3/UL (ref 0–1.3)
MONOCYTES NFR BLD AUTO: 7 %
NEUTROPHILS # BLD AUTO: 10.6 10E3/UL (ref 1.6–8.3)
NEUTROPHILS NFR BLD AUTO: 69 %
NRBC # BLD AUTO: 0 10E3/UL
NRBC BLD AUTO-RTO: 0 /100
PLATELET # BLD AUTO: 327 10E3/UL (ref 150–450)
POTASSIUM SERPL-SCNC: 4.3 MMOL/L (ref 3.4–5.3)
PROT SERPL-MCNC: 7.5 G/DL (ref 6.4–8.3)
RBC # BLD AUTO: 4.94 10E6/UL (ref 4.4–5.9)
SODIUM SERPL-SCNC: 135 MMOL/L (ref 136–145)
TSH SERPL DL<=0.005 MIU/L-ACNC: 4.04 UIU/ML (ref 0.3–4.2)
WBC # BLD AUTO: 15.5 10E3/UL (ref 4–11)

## 2023-06-07 PROCEDURE — 99214 OFFICE O/P EST MOD 30 MIN: CPT | Mod: 25 | Performed by: PHYSICIAN ASSISTANT

## 2023-06-07 PROCEDURE — 85610 PROTHROMBIN TIME: CPT | Performed by: PATHOLOGY

## 2023-06-07 PROCEDURE — G0463 HOSPITAL OUTPT CLINIC VISIT: HCPCS | Mod: 25 | Performed by: PHYSICIAN ASSISTANT

## 2023-06-07 PROCEDURE — 80050 GENERAL HEALTH PANEL: CPT | Performed by: PATHOLOGY

## 2023-06-07 PROCEDURE — 93750 INTERROGATION VAD IN PERSON: CPT | Performed by: PHYSICIAN ASSISTANT

## 2023-06-07 PROCEDURE — 83615 LACTATE (LD) (LDH) ENZYME: CPT | Performed by: PATHOLOGY

## 2023-06-07 PROCEDURE — 36415 COLL VENOUS BLD VENIPUNCTURE: CPT | Performed by: PATHOLOGY

## 2023-06-07 RX ORDER — BUMETANIDE 1 MG/1
TABLET ORAL
Qty: 30 TABLET | Refills: 0 | Status: SHIPPED | OUTPATIENT
Start: 2023-06-07 | End: 2023-10-20

## 2023-06-07 ASSESSMENT — PAIN SCALES - GENERAL: PAINLEVEL: NO PAIN (0)

## 2023-06-07 NOTE — NURSING NOTE
Chief Complaint   Patient presents with     Follow Up     Return VAD     Vitals were taken and medications reconciled.    Lex Babb, EMT  1:11 PM

## 2023-06-07 NOTE — PATIENT INSTRUCTIONS
Medications:   Continue taking your bumex as needed    Instructions:  Call cardiac rehab.    Follow-up: (make these appointments before you leave)  1. Please follow-up with VAD RABIA 6/26 as previously scheduled with C and labs prior.   2. Please follow-up with Dr. Aguiar on 7/25 as previously scheduled with labs prior.    3. Please follow-up with Dr. Aguiar on 9/26 as previously scheduled with ECHO, 6mw and device check prior.      Page the VAD Coordinator on call if you gain more than 3 lb in a day or 5 in a week. Please also page if you feel unwell or have alarms.   Great to see you in clinic today. To Page the VAD Coordinator on call, dial [phone] option #[phone] and ask to speak to the VAD coordinator on call.  ~6
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
## 2023-06-07 NOTE — PROGRESS NOTES
ANTICOAGULATION MANAGEMENT     Akshat Fragoso 58 year old male is on warfarin with therapeutic INR result. (Goal INR 2.0-3.0)    Recent labs: (last 7 days)     06/07/23  1222   INR 2.34*       ASSESSMENT     Warfarin Lab Questionnaire    Warfarin Doses Last 7 Days      6/7/2023    12:18 PM   Dose in Tablet or Mg   TAB or MG? milligram (mg)     Pt Rptd Dose SUNDAY MONDAY TUESDAY WED THURS FRIDAY SATURDAY 6/7/2023  12:18 PM 5 5 2.5 5 5 2.5 5         6/7/2023   Warfarin Lab Questionnaire   Missed doses within past 14 days? No   Changes in diet or alcohol within past 14 days? No   Medication changes since last result? No   Injuries or illness since last result? No   New shortness of breath, severe headaches or sudden changes in vision since last result? No   Abnormal bleeding since last result? No   Upcoming surgery, procedure? No   Best number to call with results? [phone]        Previous result: Therapeutic last 2(+) visits  Additional findings: None       PLAN     Recommended plan for no diet, medication or health factor changes affecting INR     Dosing Instructions: Continue your current warfarin dose with next INR in 1 week       Summary  As of 6/7/2023    Full warfarin instructions:  2.5 mg every Tue, Fri; 5 mg all other days   Next INR check:  6/14/2023             Detailed voice message left for Akshat with dosing instructions and follow up date.     Contact [phone] to schedule and with any changes, questions or concerns.     Education provided:     Please call back if any changes to your diet, medications or how you've been taking warfarin    Contact [phone] with any changes, questions or concerns.     Plan made per [facility] anticoagulation protocol and per LVAD protocol    Willa Broderick, RN  Anticoagulation Clinic  6/7/2023    _______________________________________________________________________     Anticoagulation Episode Summary     Current INR goal:  2.0-3.0   TTR:  59.1 % (1.7 mo)   Target end  date:  Indefinite   Send INR reminders to:  ANTICOAG LVAD    Indications    Acute on chronic systolic congestive heart failure (H) [I50.23]  LVAD (left ventricular assist device) present (H) [Z95.811]  Chronic systolic congestive heart failure (H) [I50.22]  Long term use of drug [Z79.899]  Left ventricular assist device present (H) [Z95.811]  Anticoagulated on Coumadin [Z79.01]           Comments:  Follow VAD Anticoag protocol:Yes: HeartMate 3   Bridging: Enoxaparin   Date VAD placed: 3/23/23         Anticoagulation Care Providers     Provider Role Specialty Phone number    Kenzie Moreau MD Referring Advanced Heart Failure and Transplant Cardiology [phone]    Mile Bolivar, APRN CNP Referring Nurse Practitioner [phone]    Shaun Aguiar MD Referring Cardiovascular Disease [phone]

## 2023-06-07 NOTE — PROGRESS NOTES
LVAD Clinic  RABIA follow-up     Akshat Fragoso is a 58 year old male with history of HFrEF 2/2 NICM (diagnosed in 1/2017) s/p ICD placement in 6/2017, VT/VF arrest on 3/15/2023 requiring CPR for 6 mins with cardiogenic shock s/p HM3 LVAD implantation as DT 3/23/2023 c/b postop AF (on amiodarone and digoxin) and HAP, moderate TR s/p TV annuloplasty with 32 mm MC3 partial ring 3/23/2023, PFO closure 3/23/2023, chronic tobacco/marijuana use, COPD, HTN, CKD stage III, who presents today for post LVAD implant follow up.     He was admitted on 3/15/2023 for cardiac arrest in the setting of VT that was slower than his programmed VT threshold for intervention. Patient had been undergoing workup at [place] for LVAD as destination therapy (initially evaluated for transplant though patient having trouble quitting marijuana/tobacco use). He underwent HM3 LVAD implantation on 3/23/2023 with postop course c/b AF with RVR requiring amiodarone gtt, volume overload, and HAP treated with IV vancomycin and zosyn for 5 days. He was discharged to home on 4/9/2023.    His main issue has been fatigue. He does get some JOHNSON. He can go up and down stairs. Can walk a couple blocks. No LE edema. No abdominal edema. No orthopnea. Sometimes he sleeps at an agle more for rib pain. No PND. No lightheadedness or dizziness unless he stands up too quick. No pre-syncope or syncope. No palpitations. He has some rib pain, no chest pain.  No snapping or clicking in the sternum. Appetite is good.    No blood in the urine or blood in the stool. No prolonged nosebleeds.     No driveline concerns.     No headaches or stroke symptoms.     Current cardiac medications  - Lisinopril 5 mg daily  - Spironolactone 12.5 mg daily  - Empagliflozin 10 mg daily  - Bumex 1 mg PRN- has taken a few times, may once every couple weeks.  - Digoxin 250 mcg daily  - Amiodarone 200 mg daily  - ASA 81 mg daily  - Warfarin (INR goal 2-3)      PAST MEDICAL HISTORY:  Past Medical  History:   Diagnosis Date     Acute right lumbar radiculopathy 11/02/2015     Acute systolic heart failure (H) 01/10/2017     Cardiomyopathy (H) 01/10/2017     CKD (chronic kidney disease) stage 3, GFR 30-59 ml/min (H) 01/30/2020     JOHNSON (dyspnea on exertion)      ED (erectile dysfunction) 10/02/2019     Erectile dysfunction      ICD (implantable cardioverter-defibrillator) in place- [employer], single chamber- NOT dependent 10/09/2017     Personal history of smoking 01/04/2017     Pulmonary nodules 01/17/2017    CT 11/2018:  Bilateral pulmonary nodules measuring up to 4 mm. No new or enlarging pulmonary nodules.     FAMILY HISTORY:  Family History   Problem Relation Age of Onset     Parkinsonism Mother      Atrial fibrillation Father      Heart Failure Father      Prostate Cancer Father      Skin Cancer Father      Anorexia nervosa Daughter      Other - See Comments Granddaughter         premature birth     SOCIAL HISTORY:  Social History     Socioeconomic History     Marital status:      Spouse name: Cheryl     Number of children: 2   Occupational History     Occupation: Construction      Employer: S[employer]   Tobacco Use     Smoking status: Former     Packs/day: 0.25     Years: 15.00     Pack years: 3.75     Types: Cigarettes     Smokeless tobacco: Former     Quit date: 3/15/2023   Vaping Use     Vaping status: Never Used   Substance and Sexual Activity     Alcohol use: No     Alcohol/week: 0.0 standard drinks of alcohol     Drug use: No     Sexual activity: Yes     Partners: Female     Birth control/protection: Condom   Other Topics Concern     Parent/sibling w/ CABG, MI or angioplasty before 65F 55M? Yes     Comment: both parents had stints placed      CURRENT MEDICATIONS:  Current Outpatient Medications   Medication     acetaminophen (TYLENOL) 325 MG tablet     amiodarone (PACERONE) 200 MG tablet     amoxicillin (AMOXIL) 500 MG tablet     aspirin (ASA) 81 MG chewable tablet     bumetanide (BUMEX) 1  MG tablet     digoxin (LANOXIN) 250 MCG tablet     empagliflozin (JARDIANCE) 10 MG TABS tablet     gabapentin (NEURONTIN) 300 MG capsule     lisinopril (ZESTRIL) 5 MG tablet     methocarbamol (ROBAXIN) 500 MG tablet     nystatin (MYCOSTATIN) 713460 UNIT/ML suspension     pantoprazole (PROTONIX) 20 MG EC tablet     polyethylene glycol (MIRALAX) 17 GM/Dose powder     senna-docusate (SENOKOT-S/PERICOLACE) 8.6-50 MG tablet     spironolactone (ALDACTONE) 25 MG tablet     warfarin ANTICOAGULANT (COUMADIN) 5 MG tablet     oxyCODONE (ROXICODONE) 5 MG tablet     No current facility-administered medications for this visit.     ROS:   See HPI     EXAM:  BP (!) 74/0 (BP Location: Right arm, Patient Position: Chair, Cuff Size: Adult Regular)   Wt 79.4 kg (175 lb 1.6 oz)   SpO2 96%   BMI 27.42 kg/m    GENERAL: Appears comfortable, in no acute distress.   HEENT: Eye symmetrical, no discharge or icterus bilaterally. Mucous membranes moist and without lesions.  CV: Hum of Hm3, no adventitious sounds. JVP ~6.   RESPIRATORY: Respirations regular, even, and unlabored. Lungs CTA throughout.   GI: Soft  and non distended with normoactive bowel sounds. No tenderness, rebound, guarding.   EXTREMITIES: Trace b/l peripheral edema. All extremities are warm and well perfused  NEUROLOGIC: Alert and interacting appropriately. No focal deficits.   MUSCULOSKELETAL: No joint swelling or tenderness.   SKIN: No jaundice. No rashes or lesions. Sternum healing well. Chest tube sites healing well. Both without signs of infection. DRiveline site c/d/i.       Labs:  Lab Results   Component Value Date    WBC 15.5 (H) 06/07/2023    HGB 13.8 06/07/2023    HCT 44.4 06/07/2023     06/07/2023     (L) 06/07/2023    POTASSIUM 4.3 06/07/2023    CHLORIDE 98 06/07/2023    CO2 26 06/07/2023    BUN 17.6 06/07/2023    CR 1.10 06/07/2023     (H) 06/07/2023    DD 3.60 (H) 04/18/2023    NTBNPI 31,087 (H) 03/20/2023    NTBN 7,719 (H) 04/18/2023     TROPI 0.033 01/09/2017    AST 36 06/07/2023    ALT 40 06/07/2023    ALKPHOS 136 (H) 06/07/2023    BILITOTAL 0.5 06/07/2023    INR 2.34 (H) 06/07/2023     Labs:    Testing/Procedures:  ICD check 4/12/2023  Device: [employer] D150 DYNAGEN  Normal device function  Mode: VVI 40 bpm  : 0%  Intrinsic rhythm: VS @ 91 bpm  R waves measured 2 mV today which is not a new finding since LVAD implant on 3/23/23. RV threshold measured 1.5 V @ 0.4 ms  Estimated battery longevity to RRT = 10.5 years  Anticoagulant: warfarin  Ventricular Arrhythmia: 1 episode recorded in the VT monitor zon greg 3/28/23 @ 1303 - 14 sec, 175 bpm  Setting Changes: None     TTE 3/28/2023  Technically difficult study.Extremely poor acoustic windows.  Limited information was obtained during study.  LVAD cannula was seen in the expected anatomic position in the LV apex.  HM3 at 5200RPM.  Septum normal.  LVIDd 56mm.  Aortic valve not well visualized. No AI.  Normal outflow velocity.  Global right ventricular function is moderately to severely reduced.  Small pericardial effusion with organizing material in pericardial cavity.     TTE 5/3/23:  Please refer to the EPIC report for measurements performed at different LVAD speed settings.  HM3 at 5200RPM at baseline.  LVIDd 43mm.  Septum normal.  Aortic valve remain closed at baseline.     Assessment and Plan:   Akshat Fragoso is a 58 year old male with history of HFrEF 2/2 NICM (diagnosed in 1/2017) s/p ICD placement in 6/2017, VT/VF arrest on 3/15/2023 requiring CPR for 6 mins with cardiogenic shock s/p HM3 LVAD implantation as DT 3/23/2023 c/b postop AF (on amiodarone and digoxin) and HAP, moderate TR s/p TV annuloplasty with 32 mm MC3 partial ring 3/23/2023, PFO closure 3/23/2023, chronic tobacco/marijuana use, COPD, HTN, CKD stage III, who presents today for post LVAD implant follow up.    He is making great progress. Appears euvolemic on PRN bumex. He is normotensive. He will start cardiac rehab  soon. End organ function is stable. He has a chronic leukocytosis.   No medication changes today continue all as prescribed. HE does have a persistent leukocytosis which is downtrending and he does not have any infectious symptoms.      Chronic systolic heart failure secondary to NICM with cardiogenic shock s/p HM III on 3/23/23 LVAD. ACC/AHA Stage D, NYHA Class III  Moderate TR s/p TV annuloplasty with 32 mm MC3 partial ring 3/23/2023.  Has a history of NICM diagnosed in 1/2017 and underwent ICD for primary SCD prophylaxis in 4/2017. Patient had been undergoing workup at [place] for LVAD as destination therapy (initially evaluated for transplant though patient having trouble quitting marijuana/tobacco use). He had cardiac arrest in the setting of VT that was slower than his programmed VT threshold for intervention on 3/15/2023. Received CPR for 6 mins with ROSC and developed cardiogenic shock. During hospitalization, he underwent HM3 LVAD implantation on 3/23/2023 with postop course c/b AF with RVR requiring amiodarone gtt, volume overload, and HAP treated with IV vancomycin and zosyn for 5 days. He was discharged to home on 4/9/2023.    Fluid status euvolemic- continue bumex 1 mg PRN  ACEi/ARB continue lisinopril 5 mg daily  BB deferred given recent LVAD implant  Aldosterone antagonist aldactone 12.5 mg daily  SCD prophylaxis ICD  NSAID use: contraindicated  Sleep Apnea Evaluation: not discussed today  BP: MAP goal 65-85, within goal at 74 today  LDH trends: 256, stable, still establishing b/l  Anticoagulation: warfarin INR goal 2-3, today 2.34  Antiplatelet: ASA 81 mg daily    VAD Interrogation on 6/7/23. VAD interrogation reviewed with VAD coordinator. Agree with findings. Occasional PI events. No power spikes, speed drops, or other findings suspicious of pump malfunction noted.       Persistent lukocytosis.   ID noted no indication for long term antibiotics inpatient given no acute findings on CT while inpatient  (he had been treated for HAP with IV antibiotics while inpatient). No signs or symptoms of infection today. All surgical sites are healing well without concerns. No driveline concerns. No other infectious symptoms.  - WBC down to 15.5 from 18 on last check, continue to trend with each appointment or sooner if he develops any symptoms  - He did have a pre-op luekocytosis longstanding around 11-12, if persistent leukocytosis after the acute post operative period could consider hematology     Afib with RVR, resolved.   - Continue Amiodarone and Digoxin.  - Warfarin with INR goal 2-3    Moderate TR s/p TV annuloplasty with 32 mm MC3 partial ring 3/23/2023  PFO closure 3/23/2023  - Trend on ECHOs    Chronic tobacco/marijuana use  - Did not discuss today, will need ongoing conversations regarding testing as this has been a transplant barrier     HTN.   - MAP within goal, continue the above regimen         I appreciate the opportunity to participate in the care of Akshat DONATO [place] . Please do not hesitate to contact me with any further questions.     Billing  - I managed 2 stable chronic conditions  - I reviewed 4 tests    Elba Chen PA-C  Advacned Heart Failure  [facility] Cardiology

## 2023-06-07 NOTE — LETTER
6/7/2023      RE: Akshat Fragoso  [address]  [facility] [address]       Dear Colleague,    Thank you for the opportunity to participate in the care of your patient, Akshat Fragoso, at the [facility] [place] [facility]. Please see a copy of my visit note below.    LVAD Clinic  RABIA follow-up     Akshat Fragoso is a 58 year old male with history of HFrEF 2/2 NICM (diagnosed in 1/2017) s/p ICD placement in 6/2017, VT/VF arrest on 3/15/2023 requiring CPR for 6 mins with cardiogenic shock s/p HM3 LVAD implantation as DT 3/23/2023 c/b postop AF (on amiodarone and digoxin) and HAP, moderate TR s/p TV annuloplasty with 32 mm MC3 partial ring 3/23/2023, PFO closure 3/23/2023, chronic tobacco/marijuana use, COPD, HTN, CKD stage III, who presents today for post LVAD implant follow up.     He was admitted on 3/15/2023 for cardiac arrest in the setting of VT that was slower than his programmed VT threshold for intervention. Patient had been undergoing workup at [place] for LVAD as destination therapy (initially evaluated for transplant though patient having trouble quitting marijuana/tobacco use). He underwent HM3 LVAD implantation on 3/23/2023 with postop course c/b AF with RVR requiring amiodarone gtt, volume overload, and HAP treated with IV vancomycin and zosyn for 5 days. He was discharged to home on 4/9/2023.    His main issue has been fatigue. He does get some JOHNSON. He can go up and down stairs. Can walk a couple blocks. No LE edema. No abdominal edema. No orthopnea. Sometimes he sleeps at an agle more for rib pain. No PND. No lightheadedness or dizziness unless he stands up too quick. No pre-syncope or syncope. No palpitations. He has some rib pain, no chest pain.  No snapping or clicking in the sternum. Appetite is good.    No blood in the urine or blood in the stool. No prolonged nosebleeds.     No driveline concerns.     No headaches or stroke  symptoms.     Current cardiac medications  - Lisinopril 5 mg daily  - Spironolactone 12.5 mg daily  - Empagliflozin 10 mg daily  - Bumex 1 mg PRN- has taken a few times, may once every couple weeks.  - Digoxin 250 mcg daily  - Amiodarone 200 mg daily  - ASA 81 mg daily  - Warfarin (INR goal 2-3)      PAST MEDICAL HISTORY:  Past Medical History:   Diagnosis Date     Acute right lumbar radiculopathy 11/02/2015     Acute systolic heart failure (H) 01/10/2017     Cardiomyopathy (H) 01/10/2017     CKD (chronic kidney disease) stage 3, GFR 30-59 ml/min (H) 01/30/2020     JOHNSON (dyspnea on exertion)      ED (erectile dysfunction) 10/02/2019     Erectile dysfunction      ICD (implantable cardioverter-defibrillator) in place- ZANY OX, single chamber- NOT dependent 10/09/2017     Personal history of smoking 01/04/2017     Pulmonary nodules 01/17/2017    CT 11/2018:  Bilateral pulmonary nodules measuring up to 4 mm. No new or enlarging pulmonary nodules.     FAMILY HISTORY:  Family History   Problem Relation Age of Onset     Parkinsonism Mother      Atrial fibrillation Father      Heart Failure Father      Prostate Cancer Father      Skin Cancer Father      Anorexia nervosa Daughter      Other - See Comments Granddaughter         premature birth     SOCIAL HISTORY:  Social History     Socioeconomic History     Marital status:      Spouse name: Cheryl     Number of children: 2   Occupational History     Occupation: Construction      Employer: SELF   Tobacco Use     Smoking status: Former     Packs/day: 0.25     Years: 15.00     Pack years: 3.75     Types: Cigarettes     Smokeless tobacco: Former     Quit date: 3/15/2023   Vaping Use     Vaping status: Never Used   Substance and Sexual Activity     Alcohol use: No     Alcohol/week: 0.0 standard drinks of alcohol     Drug use: No     Sexual activity: Yes     Partners: Female     Birth control/protection: Condom   Other Topics Concern     Parent/sibling w/ CABG,  MI or angioplasty before 65F 55M? Yes     Comment: both parents had stints placed      CURRENT MEDICATIONS:  Current Outpatient Medications   Medication     acetaminophen (TYLENOL) 325 MG tablet     amiodarone (PACERONE) 200 MG tablet     amoxicillin (AMOXIL) 500 MG tablet     aspirin (ASA) 81 MG chewable tablet     bumetanide (BUMEX) 1 MG tablet     digoxin (LANOXIN) 250 MCG tablet     empagliflozin (JARDIANCE) 10 MG TABS tablet     gabapentin (NEURONTIN) 300 MG capsule     lisinopril (ZESTRIL) 5 MG tablet     methocarbamol (ROBAXIN) 500 MG tablet     nystatin (MYCOSTATIN) 325160 UNIT/ML suspension     pantoprazole (PROTONIX) 20 MG EC tablet     polyethylene glycol (MIRALAX) 17 GM/Dose powder     senna-docusate (SENOKOT-S/PERICOLACE) 8.6-50 MG tablet     spironolactone (ALDACTONE) 25 MG tablet     warfarin ANTICOAGULANT (COUMADIN) 5 MG tablet     oxyCODONE (ROXICODONE) 5 MG tablet     No current facility-administered medications for this visit.     ROS:   See HPI     EXAM:  BP (!) 74/0 (BP Location: Right arm, Patient Position: Chair, Cuff Size: Adult Regular)   Wt 79.4 kg (175 lb 1.6 oz)   SpO2 96%   BMI 27.42 kg/m    GENERAL: Appears comfortable, in no acute distress.   HEENT: Eye symmetrical, no discharge or icterus bilaterally. Mucous membranes moist and without lesions.  CV: Hum of Hm3, no adventitious sounds. JVP ~6.   RESPIRATORY: Respirations regular, even, and unlabored. Lungs CTA throughout.   GI: Soft  and non distended with normoactive bowel sounds. No tenderness, rebound, guarding.   EXTREMITIES: Trace b/l peripheral edema. All extremities are warm and well perfused  NEUROLOGIC: Alert and interacting appropriately. No focal deficits.   MUSCULOSKELETAL: No joint swelling or tenderness.   SKIN: No jaundice. No rashes or lesions. Sternum healing well. Chest tube sites healing well. Both without signs of infection. DRiveline site c/d/i.       Labs:  Lab Results   Component Value Date    WBC 15.5 (H)  06/07/2023    HGB 13.8 06/07/2023    HCT 44.4 06/07/2023     06/07/2023     (L) 06/07/2023    POTASSIUM 4.3 06/07/2023    CHLORIDE 98 06/07/2023    CO2 26 06/07/2023    BUN 17.6 06/07/2023    CR 1.10 06/07/2023     (H) 06/07/2023    DD 3.60 (H) 04/18/2023    NTBNPI 31,087 (H) 03/20/2023    NTBNP 7,719 (H) 04/18/2023    TROPI 0.033 01/09/2017    AST 36 06/07/2023    ALT 40 06/07/2023    ALKPHOS 136 (H) 06/07/2023    BILITOTAL 0.5 06/07/2023    INR 2.34 (H) 06/07/2023     Labs:    Testing/Procedures:  ICD check 4/12/2023  Device: [employer] D150 DYNAGEN  Normal device function  Mode: VVI 40 bpm  : 0%  Intrinsic rhythm: VS @ 91 bpm  R waves measured 2 mV today which is not a new finding since LVAD implant on 3/23/23. RV threshold measured 1.5 V @ 0.4 ms  Estimated battery longevity to RRT = 10.5 years  Anticoagulant: warfarin  Ventricular Arrhythmia: 1 episode recorded in the VT monitor mansoor greg 3/28/23 @ 1303 - 14 sec, 175 bpm  Setting Changes: None     TTE 3/28/2023  Technically difficult study.Extremely poor acoustic windows.  Limited information was obtained during study.  LVAD cannula was seen in the expected anatomic position in the LV apex.  HM3 at 5200RPM.  Septum normal.  LVIDd 56mm.  Aortic valve not well visualized. No AI.  Normal outflow velocity.  Global right ventricular function is moderately to severely reduced.  Small pericardial effusion with organizing material in pericardial cavity.     TTE 5/3/23:  Please refer to the EPIC report for measurements performed at different LVAD speed settings.  HM3 at 5200RPM at baseline.  LVIDd 43mm.  Septum normal.  Aortic valve remain closed at baseline.     Assessment and Plan:   Akshat Fragoso is a 58 year old male with history of HFrEF 2/2 NICM (diagnosed in 1/2017) s/p ICD placement in 6/2017, VT/VF arrest on 3/15/2023 requiring CPR for 6 mins with cardiogenic shock s/p HM3 LVAD implantation as DT 3/23/2023 c/b postop AF (on amiodarone  and digoxin) and HAP, moderate TR s/p TV annuloplasty with 32 mm MC3 partial ring 3/23/2023, PFO closure 3/23/2023, chronic tobacco/marijuana use, COPD, HTN, CKD stage III, who presents today for post LVAD implant follow up.    He is making great progress. Appears euvolemic on PRN bumex. He is normotensive. He will start cardiac rehab soon. End organ function is stable. He has a chronic leukocytosis.   No medication changes today continue all as prescribed. HE does have a persistent leukocytosis which is downtrending and he does not have any infectious symptoms.      Chronic systolic heart failure secondary to NICM with cardiogenic shock s/p HM III on 3/23/23 LVAD. ACC/AHA Stage D, NYHA Class III  Moderate TR s/p TV annuloplasty with 32 mm MC3 partial ring 3/23/2023.  Has a history of NICM diagnosed in 1/2017 and underwent ICD for primary SCD prophylaxis in 4/2017. Patient had been undergoing workup at [place] for LVAD as destination therapy (initially evaluated for transplant though patient having trouble quitting marijuana/tobacco use). He had cardiac arrest in the setting of VT that was slower than his programmed VT threshold for intervention on 3/15/2023. Received CPR for 6 mins with ROSC and developed cardiogenic shock. During hospitalization, he underwent HM3 LVAD implantation on 3/23/2023 with postop course c/b AF with RVR requiring amiodarone gtt, volume overload, and HAP treated with IV vancomycin and zosyn for 5 days. He was discharged to home on 4/9/2023.    Fluid status euvolemic- continue bumex 1 mg PRN  ACEi/ARB continue lisinopril 5 mg daily  BB deferred given recent LVAD implant  Aldosterone antagonist aldactone 12.5 mg daily  SCD prophylaxis ICD  NSAID use: contraindicated  Sleep Apnea Evaluation: not discussed today  BP: MAP goal 65-85, within goal at 74 today  LDH trends: 256, stable, still establishing b/l  Anticoagulation: warfarin INR goal 2-3, today 2.34  Antiplatelet: ASA 81 mg daily    VAD  Interrogation on 6/7/23. VAD interrogation reviewed with VAD coordinator. Agree with findings. Occasional PI events. No power spikes, speed drops, or other findings suspicious of pump malfunction noted.       Persistent lukocytosis.   ID noted no indication for long term antibiotics inpatient given no acute findings on CT while inpatient (he had been treated for HAP with IV antibiotics while inpatient). No signs or symptoms of infection today. All surgical sites are healing well without concerns. No driveline concerns. No other infectious symptoms.  - WBC down to 15.5 from 18 on last check, continue to trend with each appointment or sooner if he develops any symptoms  - He did have a pre-op luekocytosis longstanding around 11-12, if persistent leukocytosis after the acute post operative period could consider hematology     Afib with RVR, resolved.   - Continue Amiodarone and Digoxin.  - Warfarin with INR goal 2-3    Moderate TR s/p TV annuloplasty with 32 mm MC3 partial ring 3/23/2023  PFO closure 3/23/2023  - Trend on ECHOs    Chronic tobacco/marijuana use  - Did not discuss today, will need ongoing conversations regarding testing as this has been a transplant barrier     HTN.   - MAP within goal, continue the above regimen         I appreciate the opportunity to participate in the care of Akshat Fragoso . Please do not hesitate to contact me with any further questions.     Billing  - I managed 2 stable chronic conditions  - I reviewed 4 tests    Elba Chen PA-C  Advacned Heart Failure  [facility]         Please do not hesitate to contact me if you have any questions/concerns.     Sincerely,     Elba Chen PA-C

## 2023-06-08 ENCOUNTER — ANCILLARY PROCEDURE (OUTPATIENT)
Dept: CT IMAGING | Facility: CLINIC | Age: 58
End: 2023-06-08
Attending: NURSE PRACTITIONER
Payer: COMMERCIAL

## 2023-06-08 DIAGNOSIS — G89.29 CHRONIC BILATERAL LOW BACK PAIN, UNSPECIFIED WHETHER SCIATICA PRESENT: ICD-10-CM

## 2023-06-08 DIAGNOSIS — G89.29 CHRONIC INTRACTABLE PAIN: ICD-10-CM

## 2023-06-08 DIAGNOSIS — M54.50 CHRONIC BILATERAL LOW BACK PAIN, UNSPECIFIED WHETHER SCIATICA PRESENT: ICD-10-CM

## 2023-06-08 DIAGNOSIS — M54.16 LUMBAR RADICULOPATHY: ICD-10-CM

## 2023-06-08 PROCEDURE — 72131 CT LUMBAR SPINE W/O DYE: CPT | Mod: GC | Performed by: RADIOLOGY

## 2023-06-12 ENCOUNTER — THERAPY VISIT (OUTPATIENT)
Dept: PHYSICAL THERAPY | Facility: CLINIC | Age: 58
End: 2023-06-12
Payer: COMMERCIAL

## 2023-06-12 DIAGNOSIS — G89.29 CHRONIC BILATERAL LOW BACK PAIN, UNSPECIFIED WHETHER SCIATICA PRESENT: Primary | ICD-10-CM

## 2023-06-12 DIAGNOSIS — M54.16 LUMBAR RADICULOPATHY: ICD-10-CM

## 2023-06-12 DIAGNOSIS — M54.50 CHRONIC BILATERAL LOW BACK PAIN, UNSPECIFIED WHETHER SCIATICA PRESENT: Primary | ICD-10-CM

## 2023-06-12 DIAGNOSIS — Z95.811 LVAD (LEFT VENTRICULAR ASSIST DEVICE) PRESENT (H): ICD-10-CM

## 2023-06-12 DIAGNOSIS — R07.81 RIB PAIN ON LEFT SIDE: ICD-10-CM

## 2023-06-12 DIAGNOSIS — G89.29 CHRONIC INTRACTABLE PAIN: ICD-10-CM

## 2023-06-12 PROCEDURE — 97110 THERAPEUTIC EXERCISES: CPT | Mod: GP | Performed by: PHYSICAL THERAPIST

## 2023-06-12 PROCEDURE — 97162 PT EVAL MOD COMPLEX 30 MIN: CPT | Mod: GP | Performed by: PHYSICAL THERAPIST

## 2023-06-12 RX ORDER — OXYCODONE HYDROCHLORIDE 5 MG/1
5 TABLET ORAL EVERY 8 HOURS PRN
Qty: 10 TABLET | Refills: 0 | Status: SHIPPED | OUTPATIENT
Start: 2023-06-12 | End: 2023-06-30

## 2023-06-12 NOTE — TELEPHONE ENCOUNTER
[facility] Call Center    Phone Message    May a detailed message be left on voicemail: yes     Reason for Call: Medication Refill Request    Has the patient contacted the pharmacy for the refill? Yes   Name of medication being requested: Oxycodone   Provider who prescribed the medication: Dr Estevez  Pharmacy:    [place], [place] - [address] [place] [address] [phone]    Date medication is needed: ASAP, he is going out of town tomorrow and is out, please refill asap     Action Taken: Other: PCC    Travel Screening: Not Applicable

## 2023-06-12 NOTE — TELEPHONE ENCOUNTER
oxyCODONE (ROXICODONE) 5 MG tablet 10 tablet 0 6/6/2023         Last Written Prescription Date:  6-6-2023  Last Fill Quantity: 10,   # refills: 0  Last Office Visit : 5-1-2023  Future Office visit:  none    Routing refill request to provider for review/approval because:  CONTROLLED MEDICATION      Kathleen M Doege RN

## 2023-06-12 NOTE — TELEPHONE ENCOUNTER
[place]  reviewed- last refill on 6/6 by my covering partner.  One refill sent- he should follow-up with either myself or the pain clinic to discuss ongoing refills.    Thanks,  Darian Estevez MD

## 2023-06-12 NOTE — PROGRESS NOTES
PHYSICAL THERAPY EVALUATION  Type of Visit: Evaluation    See electronic medical record for Abuse and Falls Screening details.    Subjective      Presenting condition or subjective complaint: rib fractures, back pain and sciatica  Date of onset: 03/15/23    Relevant medical history: Implanted device   Dates & types of surgery: 3/23/2023 LVAD placement, on heart transplant list    Prior diagnostic imaging/testing results: CT scan     Prior therapy history for the same diagnosis, illness or injury: No      Prior Level of Function   Transfers: Independent  Ambulation: Independent  ADL: Independent      Living Environment  Social support: With family members   Type of home: House   Stairs to enter the home: Yes 10 Is there a railing: Yes   Ramp: No   Stairs inside the home: Yes 10 Is there a railing: Yes   Help at home: None  Equipment owned: Standard wheelchair     Employment: No    Hobbies/Interests: fishing, hunting    Patient goals for therapy: live pain free    Pain assessment: Pain present  Location: L ribs, lumbar, B sciatica/Rating: 10/10     Objective   LUMBAR SPINE EVALUATION  PAIN: Pain Level at Rest: 10/10  INTEGUMENTARY (edema, incisions): WNL  POSTURE: Standing Posture: Rounded shoulders, Forward head, Lordosis decreased, Thoracic kyphosis increased  Sitting Posture: Rounded shoulders, Forward head, Thoracic kyphosis increased  GAIT:   Gait Deviations: Trunk flexion, Trunk lean L  Balance/Proprioception: Single Leg Stance Eyes Open (seconds): 5/5  ROM:   (Degrees) Left AROM Left PROM  Right AROM Right PROM   Hip Flexion 95  90    Hip Extension Lack 5  Lack 10    Hip Abduction       Hip Adduction       Hip Internal Rotation 25  20    Hip External Rotation 30  30    Knee Flexion wnl  wnl    Knee Extension 0  0    Lumbar Side glide     Lumbar Flexion Ankles, no reversal of lumbar curve   Lumbar Extension Severe loss 75%   Pain: end range flex>>ext  End feel:     Strength: abdominal brace poor, also still has  LVAD hose exiting abdomin  MYOTOMES:    Left Right   T12-L3 (Hip Flexion) 4- 4+   L2-4 (Quads)  4- 4+   L4 (Ankle DF) 4- 4+   L5 (Great Toe Ext) 4- 4+   S1 (Toe Raise) 4- 4+     DTR S: NT  DERMATOMES:    Left Right   T12      L1 Normal (light touch) Normal (light touch)   L2 Normal (light touch) Normal (light touch)   L3 Hypo (light touch) Hypo (light touch)   L4 Hypo (light touch) Hypo (light touch)   L5 Hypo (light touch) Hypo (light touch)   S1 Hypo (light touch) Hypo (light touch)     NEURAL TENSION:    Left Right   SLR Positive Positive   SLR with DF Positive Positive   Femoral Nerve NT NT   Slump NT NT   Audie (Lumbar) NT NT   Audie (Thoracic) NT NT   Audie (Cervical) NT NT   Median NT NT   Ulnar NT NT   Radial  NT NT     FLEXIBILITY: globally poor flexibility due to 28 days bedrest in hospital  LUMBAR/HIP Special Tests: DEFERRED  FUNCTIONAL TESTS: Double Leg Squat: Hip internal rotation and Improper use of glutes/hips  PALPATION: nt  CERVICAL SPINE EVALUATION  PAIN: Pain Level at Rest: 8/10  Pain Level with Use: 10/10  Pain Location: thoracic spine  POSTURE: Sitting Posture: Rounded shoulders, Forward head, Thoracic kyphosis increased      Assessment & Plan   CLINICAL IMPRESSIONS   Medical Diagnosis: Chronic intractable pain (G89.29), Rib pain on left side (R07.81), Lumbar radiculopathy (M54.16), Chronic bilateral low back pain, unspecified whether sciatica present (M54.50, G89.29)    Treatment Diagnosis: core weakness, lumbar pain   Impression/Assessment: Patient is a 58 year old male with rib and lumbar/bilateral sciatic complaints.  The following significant findings have been identified: Pain, Decreased ROM/flexibility, Decreased joint mobility, Decreased strength, Impaired balance, Decreased proprioception, Impaired sensation, Impaired gait, Impaired muscle performance, Decreased activity tolerance and Impaired posture. These impairments interfere with their ability to perform self care tasks, recreational  activities, household chores, driving , household mobility and community mobility as compared to previous level of function.     Clinical Decision Making (Complexity):   Clinical Presentation: Evolving/Changing  Clinical Presentation Rationale: based on medical and personal factors listed in PT evaluation and chronic heart failure with LVAD diagnosis.  On heart transplant list.   Clinical Decision Making (Complexity): Moderate complexity    PLAN OF CARE  Treatment Interventions:  Interventions: Gait Training, Manual Therapy, Neuromuscular Re-education, Therapeutic Activity, Therapeutic Exercise, Self-Care/Home Management    Long Term Goals     PT Goal 1  Goal Identifier: ltg  Goal Description: walk 1 mile pain 3/10  Rationale: to maximize safety and independence with performance of ADLs and functional tasks;to maximize safety and independence within the home;to maximize safety and independence within the community  Target Date: 08/07/23      Frequency of Treatment: 1x/wk  Duration of Treatment: 8 weeks    Education Assessment:        Risks and benefits of evaluation/treatment have been explained.   Patient/Family/caregiver agrees with Plan of Care.     Evaluation Time:     PT Eval, Moderate Complexity Minutes (62829): 20      Signing Clinician: Kirstin Barker PT

## 2023-06-20 ENCOUNTER — CARE COORDINATION (OUTPATIENT)
Dept: CARDIOLOGY | Facility: CLINIC | Age: 58
End: 2023-06-20

## 2023-06-20 NOTE — PROGRESS NOTES
Called patient/caregiver to check in 10 post discharge.   LVM for pt to return call.    Pt returned call:  Pt reports VAD parameters   06/22/23 1100   Heartmate 3 LEFT VS   Flow (Lpm) 4.5 Lpm   Pulse Index (PI) 3.8 PI   Speed (rpm) 5100 rpm   Power (mari)   3.6 mari   and weight 170#. Reviewed medications and answered any questions. Patient reports sleeping well and no anxiety since being home with LVAD. Patient is able to move around the house and care for him/herself independently,.     Discussed specific new problems/stressors since being discharged from the hospital: none Empathized with patient and reviewed coping strategies: enlisting support from friends and love ones, attending patient and caregiver support groups, reviewing LVAD educational materials to reinforce knowledge, and talking about concerns with family/care providers/trusted others. Encouraged pt to page VAD Coordinator with any issues or questions. Pt verbalizes understanding.

## 2023-06-23 ENCOUNTER — LAB (OUTPATIENT)
Dept: LAB | Facility: CLINIC | Age: 58
End: 2023-06-23
Payer: COMMERCIAL

## 2023-06-23 ENCOUNTER — ANTICOAGULATION THERAPY VISIT (OUTPATIENT)
Dept: ANTICOAGULATION | Facility: CLINIC | Age: 58
End: 2023-06-23

## 2023-06-23 DIAGNOSIS — Z79.899 LONG TERM USE OF DRUG: ICD-10-CM

## 2023-06-23 DIAGNOSIS — Z95.811 LEFT VENTRICULAR ASSIST DEVICE PRESENT (H): ICD-10-CM

## 2023-06-23 DIAGNOSIS — I50.22 CHRONIC SYSTOLIC CONGESTIVE HEART FAILURE (H): ICD-10-CM

## 2023-06-23 DIAGNOSIS — Z95.811 LVAD (LEFT VENTRICULAR ASSIST DEVICE) PRESENT (H): ICD-10-CM

## 2023-06-23 DIAGNOSIS — Z79.01 ANTICOAGULATED ON COUMADIN: ICD-10-CM

## 2023-06-23 DIAGNOSIS — I50.23 ACUTE ON CHRONIC SYSTOLIC CONGESTIVE HEART FAILURE (H): Primary | ICD-10-CM

## 2023-06-23 LAB — INR PPP: 2.94 (ref 0.85–1.15)

## 2023-06-23 PROCEDURE — 85610 PROTHROMBIN TIME: CPT | Performed by: PATHOLOGY

## 2023-06-23 PROCEDURE — 36415 COLL VENOUS BLD VENIPUNCTURE: CPT | Performed by: PATHOLOGY

## 2023-06-23 NOTE — PROGRESS NOTES
ANTICOAGULATION MANAGEMENT     Akshat Fragoso 58 year old male is on warfarin with therapeutic INR result. (Goal INR 2.0-3.0)    Recent labs: (last 7 days)     06/23/23  1327   INR 2.94*       ASSESSMENT       Source(s): Chart Review    Previous INR was Therapeutic last 2(+) visits    Medication, diet, health changes since last INR chart reviewed; none identified             PLAN     Recommended plan for no diet, medication or health factor changes affecting INR     Dosing Instructions: Continue your current warfarin dose with next INR in 2 weeks       Summary  As of 6/23/2023    Full warfarin instructions:  2.5 mg every Tue, Fri; 5 mg all other days   Next INR check:  7/7/2023             Detailed voice message left for Akshat with dosing instructions and follow up date.   Sent [employer] message with dosing and follow up instructions    Contact [phone] to schedule and with any changes, questions or concerns.     Education provided:     Please call back if any changes to your diet, medications or how you've been taking warfarin    Contact [phone] with any changes, questions or concerns.     Plan made per [facility] anticoagulation protocol and per LVAD protocol    Willa Broderick RN  Anticoagulation Clinic  6/23/2023    _______________________________________________________________________     Anticoagulation Episode Summary     Current INR goal:  2.0-3.0   TTR:  68.8 % (2.2 mo)   Target end date:  Indefinite   Send INR reminders to:  ANTICOAG LVAD    Indications    Acute on chronic systolic congestive heart failure (H) [I50.23]  LVAD (left ventricular assist device) present (H) [Z95.811]  Chronic systolic congestive heart failure (H) [I50.22]  Long term use of drug [Z79.899]  Left ventricular assist device present (H) [Z95.811]  Anticoagulated on Coumadin [Z79.01]           Comments:  Follow VAD Anticoag protocol:Yes: HeartMate 3   Bridging: Enoxaparin   Date VAD placed: 3/23/23         Anticoagulation Care Providers      Provider Role Specialty Phone number    Kenzie Moreau MD Referring Advanced Heart Failure and Transplant Cardiology [phone]    Mile Bolivar, APRN CNP Referring Nurse Practitioner [phone]    Shaun Aguiar MD Referring Cardiovascular Disease [phone]

## 2023-06-27 ENCOUNTER — TELEPHONE (OUTPATIENT)
Dept: ANTICOAGULATION | Facility: CLINIC | Age: 58
End: 2023-06-27

## 2023-06-27 NOTE — TELEPHONE ENCOUNTER
Anticoagulation Management    Discussed INR home monitoring program with Akshat Fragoso reviewing:      [employer] requirements: >= 3 months of anticoagulation therapy, indication for chronic anticoagulation and order from provider    Required testing frequency (q1-2 weeks)    Home meters, testing supplies, meter training, and reporting of INR results done through an outside company. Patient would be contacted by home monitoring company to review insurance coverage with home monitoring company prior to enrolling.    Paperless Transaction Management would continue to receive and manage INR results.    Home monitoring application may take several weeks and must continue to follow up with recommended INR monitoring in clinic until receives monitor and training completed.     Home monitoring terms reviewed with patient      Patient agrees to frequency of testing as directed by referring provider ( weekly or biweekly) Yes    Testing to be performed during business hours of [facility] Yes    Patient agrees they have the skill (or a designated caregiver) necessary to perform the self test Yes    Patient agrees to report all INR results to INR home monitoring company Yes    Patient agrees to have additional INR test in clinic if a home result is critical Yes    Patient agrees to use a Paperless Transaction Management approved service provider and device for home monitoring Yes    [facility]    Referring provider: Dr. Shaun Aguiar    Referring providers Clinic Fax number [phone]    Akshat Fragoso is interested home INR monitoring and requests order be submitted.

## 2023-06-28 DIAGNOSIS — Z95.811 LVAD (LEFT VENTRICULAR ASSIST DEVICE) PRESENT (H): ICD-10-CM

## 2023-06-28 RX ORDER — OXYCODONE HYDROCHLORIDE 5 MG/1
5 TABLET ORAL EVERY 8 HOURS PRN
Qty: 10 TABLET | Refills: 0 | OUTPATIENT
Start: 2023-06-28

## 2023-06-28 NOTE — TELEPHONE ENCOUNTER
[facility] Call Center    Phone Message    May a detailed message be left on voicemail: yes     Reason for Call: Medication Refill Request    Has the patient contacted the pharmacy for the refill? Yes   Name of medication being requested:    oxyCODONE (ROXICODONE) 5 MG tablet     Provider who prescribed the medication: Dr. Sergio whitehead  Pharmacy:  [facility] [place], [place] - [address] [place] [address] [phone]  Date medication is needed: asap- he has been out        Action Taken: Message routed to:  [facility] pcc    Travel Screening: Not Applicable

## 2023-06-28 NOTE — TELEPHONE ENCOUNTER
oxyCODONE (ROXICODONE) 5 MG tablet      Take 1 tablet (5 mg) by mouth every 8 hours as needed for severe pain Please schedule follow-up for further refills - Oral      Last Written Prescription Date:  6-12-23  Last Fill Quantity: 10,   # refills: 0  Last Office Visit : 5-1-23  Future Office visit:  7-31-23    Routing refill request to provider for review/approval because:  Drug not on the [facility] or [facility] refill protocol or controlled substance  NCV: 7-31-23, last rx Please schedule follow-up for further refills                      ? rtc timeframe

## 2023-06-30 RX ORDER — OXYCODONE HYDROCHLORIDE 5 MG/1
5 TABLET ORAL EVERY 8 HOURS PRN
Qty: 10 TABLET | Refills: 0 | Status: SHIPPED | OUTPATIENT
Start: 2023-06-30 | End: 2023-07-12

## 2023-06-30 NOTE — TELEPHONE ENCOUNTER
[facility] Call [facility]    Phone Message    May a detailed message be left on voicemail: yes     Reason for Call: Other: Pateint would like to know if he could get some medication until he can get in due to he made an appt and can't get in until July 31, 2023 per pt     Action Taken: Message routed to:  [facility]    Travel Screening: Not Applicable

## 2023-06-30 NOTE — TELEPHONE ENCOUNTER
I will send a short course of 10 tablets of oxycodone for now. Next dosing does need to be discussed with Dr. Estevez at follow up.     Barney Sommer MD  Internal Medicine  [facility], [place], [place]

## 2023-07-05 DIAGNOSIS — Z95.811 LVAD (LEFT VENTRICULAR ASSIST DEVICE) PRESENT (H): ICD-10-CM

## 2023-07-05 RX ORDER — PANTOPRAZOLE SODIUM 20 MG/1
20 TABLET, DELAYED RELEASE ORAL
Qty: 30 TABLET | Refills: 0 | Status: SHIPPED | OUTPATIENT
Start: 2023-07-05 | End: 2023-08-01

## 2023-07-05 RX ORDER — AMIODARONE HYDROCHLORIDE 200 MG/1
200 TABLET ORAL DAILY
Qty: 30 TABLET | Refills: 11 | Status: SHIPPED | OUTPATIENT
Start: 2023-07-05 | End: 2023-12-23

## 2023-07-05 RX ORDER — DIGOXIN 250 MCG
250 TABLET ORAL DAILY
Qty: 30 TABLET | Refills: 11 | Status: SHIPPED | OUTPATIENT
Start: 2023-07-05 | End: 2023-12-23

## 2023-07-07 ENCOUNTER — CARE COORDINATION (OUTPATIENT)
Dept: CARDIOLOGY | Facility: CLINIC | Age: 58
End: 2023-07-07
Payer: COMMERCIAL

## 2023-07-11 ENCOUNTER — TELEPHONE (OUTPATIENT)
Dept: INTERNAL MEDICINE | Facility: CLINIC | Age: 58
End: 2023-07-11

## 2023-07-11 DIAGNOSIS — Z95.811 LVAD (LEFT VENTRICULAR ASSIST DEVICE) PRESENT (H): ICD-10-CM

## 2023-07-11 NOTE — TELEPHONE ENCOUNTER
[facility] Call Center    Phone Message    May a detailed message be left on voicemail: yes     Reason for Call: Medication Question or concern regarding medication   Prescription Clarification  Name of Medication:    oxyCODONE (ROXICODONE) 5 MG tablet     Prescribing Provider: Dr. Estevez   Pharmacy:    [place], [place] - [address] [place] [address] [phone]   What on the order needs clarification? Patient is requesting 1 more refill on this med, he has an appointment scheduled for a follow up on 07/31 but he isn't able to get in any sooner.       Action Taken: Message routed to:  [facility] pcc    Travel Screening: Not Applicable

## 2023-07-12 RX ORDER — OXYCODONE HYDROCHLORIDE 5 MG/1
5 TABLET ORAL EVERY 8 HOURS PRN
Qty: 10 TABLET | Refills: 0 | Status: SHIPPED | OUTPATIENT
Start: 2023-07-12 | End: 2023-07-21

## 2023-07-12 NOTE — TELEPHONE ENCOUNTER
One further refill sent, but we should not plan to continue this long term.  He should discuss alternative therapies with the pain clinic at their upcoming appointment.    Thanks,  Darian Estevez MD

## 2023-07-15 ENCOUNTER — TELEPHONE (OUTPATIENT)
Dept: CARDIOLOGY | Facility: CLINIC | Age: 58
End: 2023-07-15
Payer: COMMERCIAL

## 2023-07-15 NOTE — TELEPHONE ENCOUNTER
----- Message from Sammi Mckinley sent at 7/6/2023 11:03 AM CDT -----  Regarding: FW: JORGE Monte Kathleen,    Would you please remove the infusion appointment request line from the therapy plan?    Thank you,  Sammi DONATO [facility] - 2nd Floor  [facility] Scheduling  [phone] (option 3, then option 2)    ----- Message -----  From: Dillon Dumont  Sent: 7/6/2023  11:02 AM CDT  To: Sammi Mckinley  Subject: RE: VENOFER                                      Yes - our nurses are trying to schedule for that initial dose.    Dillon    ----- Message -----  From: Sammi Mciknley  Sent: 7/6/2023  11:00 AM CDT  To: Dillon Dumont  Subject: RE: VENOFER                                      Thank you!  Will you be able to do the initial dose, too, or does that still need to be done in-clinic?    ----- Message -----  From: Dillon Dumont  Sent: 7/6/2023  10:59 AM CDT  To: Sammi Mckinley; [facility]  Subject: RE: VENOFER                                      Good morning,    I don't believe we have serviced the patient yet. Our nurses have been trying to reach out to patient to schedule the venofer infusions. Did you have additional questions?    Thanks,    Dillon    ----- Message -----  From: Sammi Mckinley  Sent: 7/6/2023  10:44 AM CDT  To: [facility]  Subject: VENOFER                                          Hi,    Is Akshat doing his Venofer through home infusions?    Thank you,  Sammi DONATO [facility] - 2nd [facility] Scheduling  [phone] (option 3, then option 2)

## 2023-07-17 DIAGNOSIS — Z95.811 LVAD (LEFT VENTRICULAR ASSIST DEVICE) PRESENT (H): ICD-10-CM

## 2023-07-17 DIAGNOSIS — I50.22 CHRONIC SYSTOLIC CONGESTIVE HEART FAILURE (H): ICD-10-CM

## 2023-07-17 RX ORDER — AMOXICILLIN 500 MG/1
2000 TABLET, FILM COATED ORAL PRN
Qty: 4 TABLET | Refills: 0 | Status: SHIPPED | OUTPATIENT
Start: 2023-07-17 | End: 2023-10-05

## 2023-07-18 ENCOUNTER — ANTICOAGULATION THERAPY VISIT (OUTPATIENT)
Dept: ANTICOAGULATION | Facility: CLINIC | Age: 58
End: 2023-07-18

## 2023-07-18 DIAGNOSIS — I50.22 CHRONIC SYSTOLIC CONGESTIVE HEART FAILURE (H): ICD-10-CM

## 2023-07-18 DIAGNOSIS — Z79.899 LONG TERM USE OF DRUG: ICD-10-CM

## 2023-07-18 DIAGNOSIS — Z95.811 LVAD (LEFT VENTRICULAR ASSIST DEVICE) PRESENT (H): ICD-10-CM

## 2023-07-18 DIAGNOSIS — I50.23 ACUTE ON CHRONIC SYSTOLIC CONGESTIVE HEART FAILURE (H): Primary | ICD-10-CM

## 2023-07-18 DIAGNOSIS — Z95.811 LEFT VENTRICULAR ASSIST DEVICE PRESENT (H): ICD-10-CM

## 2023-07-18 DIAGNOSIS — Z79.01 ANTICOAGULATED ON COUMADIN: ICD-10-CM

## 2023-07-18 LAB — INR HOME MONITORING: 2.8 (ref 2–3)

## 2023-07-18 NOTE — PROGRESS NOTES
ANTICOAGULATION MANAGEMENT     Akshat Fragoso 58 year old male is on warfarin with therapeutic INR result. (Goal INR 2.0-3.0)    Recent labs: (last 7 days)     07/18/23  0000   INR 2.8       ASSESSMENT       Source(s): Chart Review    Previous INR was Therapeutic last 2(+) visits    Medication, diet, health changes since last INR chart reviewed; none identified             PLAN     Recommended plan for no diet, medication or health factor changes affecting INR     Dosing Instructions: Continue your current warfarin dose with next INR in 1-2 weeks       Summary  As of 7/18/2023    Full warfarin instructions:  2.5 mg every Tue, Fri; 5 mg all other days   Next INR check:  8/1/2023             Detailed voice message left for Akshat with dosing instructions and follow up date.     Patient to recheck with home meter    Education provided:     Please call back if any changes to your diet, medications or how you've been taking warfarin    Contact [phone] with any changes, questions or concerns.     Plan made per [facility] anticoagulation protocol and per LVAD protocol    Willa Broderick RN  Anticoagulation Clinic  7/18/2023    _______________________________________________________________________     Anticoagulation Episode Summary     Current INR goal:  2.0-3.0   TTR:  77.1 % (3.1 mo)   Target end date:  Indefinite   Send INR reminders to:  ANTICOAG LVAD    Indications    Acute on chronic systolic congestive heart failure (H) [I50.23]  LVAD (left ventricular assist device) present (H) [Z95.811]  Chronic systolic congestive heart failure (H) [I50.22]  Long term use of drug [Z79.899]  Left ventricular assist device present (H) [Z95.811]  Anticoagulated on Coumadin [Z79.01]           Comments:  Follow VAD Anticoag protocol:Yes: HeartMate 3   Bridging: Enoxaparin   Date VAD placed: 3/23/23         Anticoagulation Care Providers     Provider Role Specialty Phone number    Kenzie Moreau MD Referring Advanced Heart Failure  and Transplant Cardiology [phone]    Mile Bolivar, APRN CNP Referring Nurse Practitioner [phone]    Shaun Aguiar MD Referring Cardiovascular Disease [phone]

## 2023-07-21 DIAGNOSIS — I50.22 CHRONIC SYSTOLIC CONGESTIVE HEART FAILURE (H): Primary | ICD-10-CM

## 2023-07-21 DIAGNOSIS — Z95.811 LVAD (LEFT VENTRICULAR ASSIST DEVICE) PRESENT (H): ICD-10-CM

## 2023-07-21 RX ORDER — OXYCODONE HYDROCHLORIDE 5 MG/1
5 TABLET ORAL EVERY 8 HOURS PRN
Qty: 10 TABLET | Refills: 0 | Status: SHIPPED | OUTPATIENT
Start: 2023-07-21 | End: 2023-08-02

## 2023-07-21 NOTE — TELEPHONE ENCOUNTER
[facility] Call [facility]    Phone Message    May a detailed message be left on voicemail: yes     Reason for Call: Medication Refill Request    Has the patient contacted the pharmacy for the refill? Yes   Name of medication being requested: oxyCODONE (ROXICODONE) 5 MG tablet [    Provider who prescribed the medication: PCP  Pharmacy: [address] [place] [address]   Date medication is needed: asap         Action Taken: Message routed to:  [facility] pcc    Travel Screening: Not Applicable

## 2023-07-21 NOTE — TELEPHONE ENCOUNTER
oxyCODONE (ROXICODONE) 5 MG tablet    Last Written Prescription Date: 7/12/23  Last Fill Quantity: 10   # refills: 0  Last Office Visit : 5/1/23  Future Office visit: 7/31/23      Routing refill request to provider for review/approval because:  Controlled. request per pt call.

## 2023-07-25 ENCOUNTER — ANTICOAGULATION THERAPY VISIT (OUTPATIENT)
Dept: ANTICOAGULATION | Facility: CLINIC | Age: 58
End: 2023-07-25

## 2023-07-25 ENCOUNTER — ANCILLARY PROCEDURE (OUTPATIENT)
Dept: CARDIOLOGY | Facility: CLINIC | Age: 58
End: 2023-07-25
Attending: INTERNAL MEDICINE
Payer: COMMERCIAL

## 2023-07-25 ENCOUNTER — LAB (OUTPATIENT)
Dept: LAB | Facility: CLINIC | Age: 58
End: 2023-07-25
Payer: COMMERCIAL

## 2023-07-25 VITALS
WEIGHT: 183.5 LBS | HEART RATE: 75 BPM | HEIGHT: 67 IN | OXYGEN SATURATION: 97 % | BODY MASS INDEX: 28.8 KG/M2 | SYSTOLIC BLOOD PRESSURE: 80 MMHG

## 2023-07-25 DIAGNOSIS — I50.22 CHRONIC SYSTOLIC CONGESTIVE HEART FAILURE (H): ICD-10-CM

## 2023-07-25 DIAGNOSIS — I47.20 VENTRICULAR TACHYCARDIA (H): ICD-10-CM

## 2023-07-25 DIAGNOSIS — Z95.811 LEFT VENTRICULAR ASSIST DEVICE PRESENT (H): ICD-10-CM

## 2023-07-25 DIAGNOSIS — Z79.899 LONG TERM USE OF DRUG: ICD-10-CM

## 2023-07-25 DIAGNOSIS — Z79.01 ANTICOAGULATED ON COUMADIN: ICD-10-CM

## 2023-07-25 DIAGNOSIS — Z79.899 LONG TERM USE OF DRUG: Primary | ICD-10-CM

## 2023-07-25 DIAGNOSIS — Z95.811 LVAD (LEFT VENTRICULAR ASSIST DEVICE) PRESENT (H): ICD-10-CM

## 2023-07-25 DIAGNOSIS — I50.23 ACUTE ON CHRONIC SYSTOLIC CONGESTIVE HEART FAILURE (H): Primary | ICD-10-CM

## 2023-07-25 DIAGNOSIS — I50.20 HEART FAILURE WITH REDUCED EJECTION FRACTION, NYHA CLASS III (H): ICD-10-CM

## 2023-07-25 DIAGNOSIS — I48.0 PAROXYSMAL ATRIAL FIBRILLATION (H): ICD-10-CM

## 2023-07-25 LAB
ALBUMIN SERPL BCG-MCNC: 4.3 G/DL (ref 3.5–5.2)
ALP SERPL-CCNC: 109 U/L (ref 40–129)
ALT SERPL W P-5'-P-CCNC: 43 U/L (ref 0–70)
ANION GAP SERPL CALCULATED.3IONS-SCNC: 8 MMOL/L (ref 7–15)
AST SERPL W P-5'-P-CCNC: 36 U/L (ref 0–45)
BASOPHILS # BLD AUTO: 0.1 10E3/UL (ref 0–0.2)
BASOPHILS NFR BLD AUTO: 1 %
BILIRUB SERPL-MCNC: 0.3 MG/DL
BUN SERPL-MCNC: 16.1 MG/DL (ref 6–20)
CALCIUM SERPL-MCNC: 9.5 MG/DL (ref 8.6–10)
CHLORIDE SERPL-SCNC: 99 MMOL/L (ref 98–107)
CREAT SERPL-MCNC: 1.15 MG/DL (ref 0.67–1.17)
DEPRECATED HCO3 PLAS-SCNC: 29 MMOL/L (ref 22–29)
EOSINOPHIL # BLD AUTO: 0.3 10E3/UL (ref 0–0.7)
EOSINOPHIL NFR BLD AUTO: 2 %
ERYTHROCYTE [DISTWIDTH] IN BLOOD BY AUTOMATED COUNT: 16.2 % (ref 10–15)
GFR SERPL CREATININE-BSD FRML MDRD: 75 ML/MIN/1.73M2
GLUCOSE SERPL-MCNC: 112 MG/DL (ref 70–99)
HCT VFR BLD AUTO: 44.1 % (ref 40–53)
HGB BLD-MCNC: 13.6 G/DL (ref 13.3–17.7)
IMM GRANULOCYTES # BLD: 0.1 10E3/UL
IMM GRANULOCYTES NFR BLD: 1 %
INR PPP: 2.65 (ref 0.85–1.15)
LDH SERPL L TO P-CCNC: 256 U/L (ref 0–250)
LYMPHOCYTES # BLD AUTO: 2.3 10E3/UL (ref 0.8–5.3)
LYMPHOCYTES NFR BLD AUTO: 16 %
MCH RBC QN AUTO: 27.1 PG (ref 26.5–33)
MCHC RBC AUTO-ENTMCNC: 30.8 G/DL (ref 31.5–36.5)
MCV RBC AUTO: 88 FL (ref 78–100)
MONOCYTES # BLD AUTO: 1.1 10E3/UL (ref 0–1.3)
MONOCYTES NFR BLD AUTO: 8 %
NEUTROPHILS # BLD AUTO: 10.6 10E3/UL (ref 1.6–8.3)
NEUTROPHILS NFR BLD AUTO: 72 %
NRBC # BLD AUTO: 0 10E3/UL
NRBC BLD AUTO-RTO: 0 /100
PLATELET # BLD AUTO: 272 10E3/UL (ref 150–450)
POTASSIUM SERPL-SCNC: 4.9 MMOL/L (ref 3.4–5.3)
PROT SERPL-MCNC: 6.9 G/DL (ref 6.4–8.3)
RBC # BLD AUTO: 5.02 10E6/UL (ref 4.4–5.9)
SODIUM SERPL-SCNC: 136 MMOL/L (ref 136–145)
WBC # BLD AUTO: 14.5 10E3/UL (ref 4–11)

## 2023-07-25 PROCEDURE — 36415 COLL VENOUS BLD VENIPUNCTURE: CPT | Performed by: PATHOLOGY

## 2023-07-25 PROCEDURE — 85025 COMPLETE CBC W/AUTO DIFF WBC: CPT | Performed by: PATHOLOGY

## 2023-07-25 PROCEDURE — 99215 OFFICE O/P EST HI 40 MIN: CPT | Performed by: INTERNAL MEDICINE

## 2023-07-25 PROCEDURE — 85610 PROTHROMBIN TIME: CPT | Performed by: PATHOLOGY

## 2023-07-25 PROCEDURE — 93282 PRGRMG EVAL IMPLANTABLE DFB: CPT | Performed by: INTERNAL MEDICINE

## 2023-07-25 PROCEDURE — G0463 HOSPITAL OUTPT CLINIC VISIT: HCPCS | Mod: 25 | Performed by: INTERNAL MEDICINE

## 2023-07-25 PROCEDURE — 83615 LACTATE (LD) (LDH) ENZYME: CPT | Performed by: PATHOLOGY

## 2023-07-25 PROCEDURE — 80053 COMPREHEN METABOLIC PANEL: CPT | Performed by: PATHOLOGY

## 2023-07-25 PROCEDURE — 93750 INTERROGATION VAD IN PERSON: CPT | Performed by: INTERNAL MEDICINE

## 2023-07-25 RX ORDER — EPLERENONE 25 MG/1
TABLET, FILM COATED ORAL
Qty: 45 TABLET | Refills: 3 | Status: SHIPPED | OUTPATIENT
Start: 2023-07-25 | End: 2023-12-23

## 2023-07-25 RX ORDER — LIDOCAINE 40 MG/G
CREAM TOPICAL
Status: CANCELLED | OUTPATIENT
Start: 2023-07-25

## 2023-07-25 ASSESSMENT — PAIN SCALES - GENERAL: PAINLEVEL: NO PAIN (0)

## 2023-07-25 NOTE — PATIENT INSTRUCTIONS
It was a pleasure to see you in clinic today, please do not hesitate to call us for any questions or concerns.  We will see you back in clinic on 9/26/23 when you return for testing.    Yumiko Antonio RN    Electrophysiology Nurse Clinician  [facility]    During Business Hours Please Call:  [phone]  After Hours Please Call:  [phone] - select option #4 and ask for job code [phone]
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
## 2023-07-25 NOTE — NURSING NOTE
Chief Complaint   Patient presents with    Follow Up     Return VAD       Vitals were taken, medications reconciled.    Paige Thurner, Facilitator   1:49 PM

## 2023-07-25 NOTE — PROGRESS NOTES
ANTICOAGULATION MANAGEMENT     Akshat Fragoso 58 year old male is on warfarin with therapeutic INR result. (Goal INR 2.0-3.0)    Recent labs: (last 7 days)     07/25/23  1243   INR 2.65*       ASSESSMENT     Source(s): Chart Review  Previous INR was Therapeutic last 2(+) visits  Medication, diet, health changes since last INR chart reviewed; none identified         PLAN     Recommended plan for no diet, medication or health factor changes affecting INR     Dosing Instructions: Continue your current warfarin dose with next INR in 2 weeks       Summary  As of 7/25/2023      Full warfarin instructions:  2.5 mg every Tue, Fri; 5 mg all other days   Next INR check:  8/7/2023               Detailed voice message left for Akshat with dosing instructions and follow up date.   Sent [employer] message with dosing and follow up instructions    Lab visit scheduled    Education provided:   Please call back if any changes to your diet, medications or how you've been taking warfarin  Contact [phone] with any changes, questions or concerns.     Plan made per [facility] anticoagulation protocol and per LVAD protocol    Willa Broderick RN  Anticoagulation Clinic  7/25/2023    _______________________________________________________________________     Anticoagulation Episode Summary       Current INR goal:  2.0-3.0   TTR:  78.8 % (3.3 mo)   Target end date:  Indefinite   Send INR reminders to:  ANTICOAG LVAD    Indications    Acute on chronic systolic congestive heart failure (H) [I50.23]  LVAD (left ventricular assist device) present (H) [Z95.811]  Chronic systolic congestive heart failure (H) [I50.22]  Long term use of drug [Z79.899]  Left ventricular assist device present (H) [Z95.811]  Anticoagulated on Coumadin [Z79.01]             Comments:  Follow VAD Anticoag protocol:Yes: HeartMate 3   Bridging: Enoxaparin   Date VAD placed: 3/23/23             Anticoagulation Care Providers       Provider Role Specialty Phone number    [place]  Kenzie Chavis MD Referring Advanced Heart Failure and Transplant Cardiology [phone]    Mile Bolivar, APRN CNP Referring Nurse Practitioner [phone]    Shaun Aguiar MD Referring Cardiovascular Disease [phone]

## 2023-07-25 NOTE — NURSING NOTE
[facility] VAD Pump Info       Row Name 07/25/23 1400             [facility] VAD Information    Implant --      LVAD Pump --         Heartmate 3 LEFT VS    Flow (Lpm) 4.2 Lpm      Pulse Index (PI) 4.1 PI      Speed (rpm) 5100 rpm      Power (mari) 3.6 mari      Current Hct setting 44.1         Primary Controller    Serial number [id number]      Low flow alarm setting --      High watt alarm setting --      EBB: Patient use 12      Replace in 26 Months         Backup Controller    Serial number [id number]      EBB: Patient use 4      Replace EBB in 27 Months      Speed & HCT match primary controller --         VAD Interrogation    Alarms reported by patient N      Unexpected alarms noted upon interrogation None      PI events Occasional  PI 2.9-6.6, rare speed drop, hx back 1 week      Damage to equipment is noted N      Action taken Reviewed proper equipment care and maintenance         Driveline Exit Site    Dressing change done Y  pt and wife voiced concern about a scab and occasional pain with removing gauze. site looks clean, no drainage. scant scab at proximal side of exit site. reeducated on gentle cleaning, allow slack under the driveline, and assessing DL anchor daily.      Driveline properly secured Yes      DLES assessment c/d/i      Dressing used Daily kit      Frequency patient changes dressing Q 3 days      Dressing modifications --      Dressing change supplier --                      Education Complete: Yes   Charge the BACKUP controller s backup battery every 6 months  Perform a self test on BACKUP every 6 months  Change the MPU s batteries every 6 months:Yes  Have equipment serviced yearly (if applicable):Yes

## 2023-07-25 NOTE — PATIENT INSTRUCTIONS
Medications:  Stop Spironolactone  START Eplerenone 12.5mg daily.    Instructions:  Please reschedule your right heart cath for next available appointment.     Follow-up: (make these appointments before you leave)  1. Please follow-up with VAD RABIA in 3 months with labs prior - please rescheduled testing that is currently scheduled on 9/26 to align with this visit.   2. Please follow-up with Dr. Aguiar in 6 months with labs prior.        Page the VAD Coordinator on call if you gain more than 3 lb in a day or 5 in a week. Please also page if you feel unwell or have alarms.   Great to see you in clinic today. To Page the VAD Coordinator on call, dial [phone] option #4 and ask to speak to the VAD coordinator on call.       Mechanical Circulatory Support Program  [address], [facility] [address] [place] [address]  [phone] Office Phone  [phone] Fax Number   VAD Right Heart Catheterization Instructions    The date of your procedure is TBD. Arrival time is TBD.  Location is [address] Waiting Room  Please plan on being at the hospital for 3-4 hours.  Anticoagulation: take as prescribed  At any time, emergencies and/or urgent cases may come up which could delay the start of your procedure. If you have a clinic appointment afterwards please contact the VAD team to let them know you will be delayed.    Pre-procedure Instructions  No solid food for 8 hours prior  Nothing to drink 2 hours prior to arrival time  You can take your morning medications (except diabetic and blood thinners) with sips of water  We recommend you arrange for a ride to drop you off and pick you up, in the instance, you are unable to drive home, however you should be able to function as you normally would after the procedure  Pt instructed not to consume Alcohol, Tobacco, Caffeine, or Carbonated beverages 12 hours  prior to procedure.              Diabetic Medication Instructions  DO NOT take any oral diabetic medication, short-acting diabetes medications/insulin, humalog or regular insulin the morning of your test  Take   dose of long-acting insulin (Lantus, Levemir) the day of your test  Hold Oral Diabetic on the day of the procedure and for 48 hours after IV contrast given  Remember to  bring your glucometer and insulin with you to take after your test if needed             If you have any questions or concerns, please page the VAD Coordinator on call.

## 2023-08-01 DIAGNOSIS — Z95.811 LVAD (LEFT VENTRICULAR ASSIST DEVICE) PRESENT (H): ICD-10-CM

## 2023-08-01 RX ORDER — PANTOPRAZOLE SODIUM 20 MG/1
20 TABLET, DELAYED RELEASE ORAL
Qty: 90 TABLET | Refills: 3 | Status: SHIPPED | OUTPATIENT
Start: 2023-08-01 | End: 2023-12-23
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
## 2023-08-02 RX ORDER — OXYCODONE HYDROCHLORIDE 5 MG/1
5 TABLET ORAL EVERY 8 HOURS PRN
Qty: 10 TABLET | Refills: 0 | Status: SHIPPED | OUTPATIENT
Start: 2023-08-02 | End: 2023-08-14

## 2023-08-02 NOTE — TELEPHONE ENCOUNTER
[place]  reviewed- last refill on 7/21.  One refill signed to last until pain clinic appointment.    Thanks,  Darian Estevez MD

## 2023-08-03 ENCOUNTER — CARE COORDINATION (OUTPATIENT)
Dept: CARDIOLOGY | Facility: CLINIC | Age: 58
End: 2023-08-03
Payer: COMMERCIAL

## 2023-08-03 DIAGNOSIS — Z95.811 LEFT VENTRICULAR ASSIST DEVICE PRESENT (H): ICD-10-CM

## 2023-08-03 DIAGNOSIS — I50.22 CHRONIC SYSTOLIC CONGESTIVE HEART FAILURE (H): ICD-10-CM

## 2023-08-03 DIAGNOSIS — Z79.899 LONG TERM USE OF DRUG: Primary | ICD-10-CM

## 2023-08-07 ENCOUNTER — APPOINTMENT (OUTPATIENT)
Dept: MEDSURG UNIT | Facility: CLINIC | Age: 58
End: 2023-08-07
Attending: INTERNAL MEDICINE
Payer: COMMERCIAL

## 2023-08-07 ENCOUNTER — APPOINTMENT (OUTPATIENT)
Dept: LAB | Facility: CLINIC | Age: 58
End: 2023-08-07
Attending: INTERNAL MEDICINE
Payer: COMMERCIAL

## 2023-08-07 ENCOUNTER — ANTICOAGULATION THERAPY VISIT (OUTPATIENT)
Dept: ANTICOAGULATION | Facility: CLINIC | Age: 58
End: 2023-08-07

## 2023-08-07 ENCOUNTER — HOSPITAL ENCOUNTER (OUTPATIENT)
Facility: CLINIC | Age: 58
Discharge: HOME | End: 2023-08-07
Attending: INTERNAL MEDICINE | Admitting: INTERNAL MEDICINE
Payer: COMMERCIAL

## 2023-08-07 VITALS
OXYGEN SATURATION: 100 % | WEIGHT: 180.12 LBS | DIASTOLIC BLOOD PRESSURE: 87 MMHG | RESPIRATION RATE: 15 BRPM | TEMPERATURE: 98.4 F | HEART RATE: 98 BPM | SYSTOLIC BLOOD PRESSURE: 100 MMHG | HEIGHT: 67 IN | BODY MASS INDEX: 28.27 KG/M2

## 2023-08-07 DIAGNOSIS — Z95.811 LEFT VENTRICULAR ASSIST DEVICE PRESENT (H): ICD-10-CM

## 2023-08-07 DIAGNOSIS — Z79.899 LONG TERM USE OF DRUG: ICD-10-CM

## 2023-08-07 DIAGNOSIS — I50.22 CHRONIC SYSTOLIC CONGESTIVE HEART FAILURE (H): ICD-10-CM

## 2023-08-07 DIAGNOSIS — Z95.811 LVAD (LEFT VENTRICULAR ASSIST DEVICE) PRESENT (H): ICD-10-CM

## 2023-08-07 DIAGNOSIS — Z79.01 ANTICOAGULATED ON COUMADIN: ICD-10-CM

## 2023-08-07 DIAGNOSIS — I50.23 ACUTE ON CHRONIC SYSTOLIC CONGESTIVE HEART FAILURE (H): Primary | ICD-10-CM

## 2023-08-07 LAB
ALBUMIN SERPL BCG-MCNC: 4.7 G/DL (ref 3.5–5.2)
ALP SERPL-CCNC: 114 U/L (ref 40–129)
ALT SERPL W P-5'-P-CCNC: 50 U/L (ref 0–70)
ANION GAP SERPL CALCULATED.3IONS-SCNC: 12 MMOL/L (ref 7–15)
AST SERPL W P-5'-P-CCNC: 44 U/L (ref 0–45)
BASOPHILS # BLD AUTO: 0.1 10E3/UL (ref 0–0.2)
BASOPHILS NFR BLD AUTO: 1 %
BILIRUB SERPL-MCNC: 0.4 MG/DL
BUN SERPL-MCNC: 25.5 MG/DL (ref 6–20)
CALCIUM SERPL-MCNC: 9.2 MG/DL (ref 8.6–10)
CHLORIDE SERPL-SCNC: 97 MMOL/L (ref 98–107)
CREAT SERPL-MCNC: 1.65 MG/DL (ref 0.67–1.17)
DEPRECATED HCO3 PLAS-SCNC: 26 MMOL/L (ref 22–29)
EOSINOPHIL # BLD AUTO: 0.5 10E3/UL (ref 0–0.7)
EOSINOPHIL NFR BLD AUTO: 4 %
ERYTHROCYTE [DISTWIDTH] IN BLOOD BY AUTOMATED COUNT: 16.5 % (ref 10–15)
GFR SERPL CREATININE-BSD FRML MDRD: 48 ML/MIN/1.73M2
GLUCOSE SERPL-MCNC: 110 MG/DL (ref 70–99)
HCT VFR BLD AUTO: 47.6 % (ref 40–53)
HGB BLD-MCNC: 14.1 G/DL (ref 13.3–17.7)
HGB BLD-MCNC: 14.5 G/DL (ref 13.3–17.7)
IMM GRANULOCYTES # BLD: 0.2 10E3/UL
IMM GRANULOCYTES NFR BLD: 2 %
INR PPP: 2.49 (ref 0.85–1.15)
LDH SERPL L TO P-CCNC: 282 U/L (ref 0–250)
LYMPHOCYTES # BLD AUTO: 3.1 10E3/UL (ref 0.8–5.3)
LYMPHOCYTES NFR BLD AUTO: 21 %
MCH RBC QN AUTO: 27 PG (ref 26.5–33)
MCHC RBC AUTO-ENTMCNC: 30.5 G/DL (ref 31.5–36.5)
MCV RBC AUTO: 89 FL (ref 78–100)
MONOCYTES # BLD AUTO: 1.3 10E3/UL (ref 0–1.3)
MONOCYTES NFR BLD AUTO: 9 %
NEUTROPHILS # BLD AUTO: 9.4 10E3/UL (ref 1.6–8.3)
NEUTROPHILS NFR BLD AUTO: 63 %
NRBC # BLD AUTO: 0 10E3/UL
NRBC BLD AUTO-RTO: 0 /100
OXYHGB MFR BLDV: 59 % (ref 92–100)
PLATELET # BLD AUTO: 290 10E3/UL (ref 150–450)
POTASSIUM SERPL-SCNC: 4.5 MMOL/L (ref 3.4–5.3)
PROT SERPL-MCNC: 7.5 G/DL (ref 6.4–8.3)
RBC # BLD AUTO: 5.38 10E6/UL (ref 4.4–5.9)
SODIUM SERPL-SCNC: 135 MMOL/L (ref 136–145)
WBC # BLD AUTO: 14.7 10E3/UL (ref 4–11)

## 2023-08-07 PROCEDURE — C1751 CATH, INF, PER/CENT/MIDLINE: HCPCS | Performed by: INTERNAL MEDICINE

## 2023-08-07 PROCEDURE — 999N000142 HC STATISTIC PROCEDURE PREP ONLY

## 2023-08-07 PROCEDURE — 83615 LACTATE (LD) (LDH) ENZYME: CPT | Performed by: INTERNAL MEDICINE

## 2023-08-07 PROCEDURE — 85025 COMPLETE CBC W/AUTO DIFF WBC: CPT | Performed by: INTERNAL MEDICINE

## 2023-08-07 PROCEDURE — 36415 COLL VENOUS BLD VENIPUNCTURE: CPT | Performed by: INTERNAL MEDICINE

## 2023-08-07 PROCEDURE — 272N000001 HC OR GENERAL SUPPLY STERILE: Performed by: INTERNAL MEDICINE

## 2023-08-07 PROCEDURE — 93451 RIGHT HEART CATH: CPT | Performed by: INTERNAL MEDICINE

## 2023-08-07 PROCEDURE — 250N000009 HC RX 250: Performed by: INTERNAL MEDICINE

## 2023-08-07 PROCEDURE — 93451 RIGHT HEART CATH: CPT | Mod: 26 | Performed by: INTERNAL MEDICINE

## 2023-08-07 PROCEDURE — 85018 HEMOGLOBIN: CPT

## 2023-08-07 PROCEDURE — 999N000132 HC STATISTIC PP CARE STAGE 1

## 2023-08-07 PROCEDURE — 82810 BLOOD GASES O2 SAT ONLY: CPT

## 2023-08-07 PROCEDURE — 80053 COMPREHEN METABOLIC PANEL: CPT | Performed by: INTERNAL MEDICINE

## 2023-08-07 PROCEDURE — 85610 PROTHROMBIN TIME: CPT | Performed by: INTERNAL MEDICINE

## 2023-08-07 RX ORDER — LIDOCAINE 40 MG/G
CREAM TOPICAL
Status: COMPLETED | OUTPATIENT
Start: 2023-08-07 | End: 2023-08-07

## 2023-08-07 RX ADMIN — LIDOCAINE: 40 CREAM TOPICAL at 09:52

## 2023-08-07 ASSESSMENT — ACTIVITIES OF DAILY LIVING (ADL)
ADLS_ACUITY_SCORE: 35
ADLS_ACUITY_SCORE: 35

## 2023-08-07 NOTE — PROGRESS NOTES
ANTICOAGULATION MANAGEMENT     Akshat Fragoso 58 year old male is on warfarin with therapeutic INR result. (Goal INR 2.0-3.0)    Recent labs: (last 7 days)     08/07/23  0906   INR 2.49*       ASSESSMENT     Source(s): Chart Review     Warfarin doses taken: Reviewed in chart  Diet: No new diet changes identified  Medication/supplement changes: None noted  New illness, injury, or hospitalization: No-RHC performed today  Signs or symptoms of bleeding or clotting: No  Previous result: Therapeutic last 2(+) visits  Additional findings: None       PLAN     Recommended plan for no diet, medication or health factor changes affecting INR     Dosing Instructions: Continue your current warfarin dose with next INR in 3 weeks       Summary  As of 8/7/2023      Full warfarin instructions:  2.5 mg every Tue, Fri; 5 mg all other days   Next INR check:  8/28/2023               Detailed voice message left for Akshat with dosing instructions and follow up date.   Sent [employer] message with dosing and follow up instructions    Contact [phone] to schedule and with any changes, questions or concerns.     Education provided:   Please call back if any changes to your diet, medications or how you've been taking warfarin  Goal range and lab monitoring: goal range and significance of current result and Importance of following up at instructed interval  Contact [phone] with any changes, questions or concerns.     Plan made per [facility] anticoagulation protocol and per LVAD protocol    TREV LYN RN  Anticoagulation Clinic  8/7/2023    _______________________________________________________________________     Anticoagulation Episode Summary       Current INR goal:  2.0-3.0   TTR:  81.2 % (3.7 mo)   Target end date:  Indefinite   Send INR reminders to:  ANTICOAG LVAD    Indications    Acute on chronic systolic congestive heart failure (H) [I50.23]  LVAD (left ventricular assist device) present (H) [Z95.811]  Chronic systolic congestive  heart failure (H) [I50.22]  Long term use of drug [Z79.899]  Left ventricular assist device present (H) [Z95.811]  Anticoagulated on Coumadin [Z79.01]             Comments:  Follow VAD Anticoag protocol:Yes: HeartMate 3   Bridging: Enoxaparin   Date VAD placed: 3/23/23             Anticoagulation Care Providers       Provider Role Specialty Phone number    Kenzie Moreau MD Referring Advanced Heart Failure and Transplant Cardiology [phone]    Mile Bolivar, VERONICA CNP Referring Nurse Practitioner [phone]    Shaun gAuiar MD Referring Cardiovascular Disease [phone]

## 2023-08-07 NOTE — PROGRESS NOTES
Pt arrived via cart with RN from [facility] s/p RHC. VSS. RIJ site CDI, no hematoma. Patient denies pain.  Wife at bedside.       Condition is stable s/p RHC. Vital Signs are stable/WNL. RIJ site clean, dry and intact, cms intact. Discharge instructions reviewed with patient and questions answered. Patient verbalizes understanding. Pain under control. Patient is ambulating and voiding spontaneously. Patient is tolerating regular diet and denies any N/V. Patient to be discharged to home via wife. Patient has all belongings

## 2023-08-07 NOTE — DISCHARGE INSTRUCTIONS
[facility]                        Interventional Cardiology  Discharge Instructions   Post Right Heart Cath and/or Heart Biopsy      AFTER YOU GO HOME:  DO drink plenty of fluids  DO resume your regular diet and medications unless otherwise instructed by your Primary Physician  Do Not scrub the procedure site vigorously  No lotion or powder to the puncture site for 3 days    CALL YOUR PRIMARY PHYSICIAN IF: You may resume all normal activity.  Monitor neck site for bleeding, swelling, or voice changes. If you notice bleeding or swelling immediately apply pressure to the site and call number below to speak with Cardiology Fellow.  If you experience any changes in your breathing you should call your doctor immediately or come to the closest Emergency Department.  Do not drive yourself.    ADDITIONAL INSTRUCTIONS: Medications: You are to resume all home medications including anticoagulation therapy unless otherwise advised by your primary cardiologist or nurse coordinator.    Follow Up: Per your primary cardiology team    If you have any questions or concerns regarding your procedure site please call [phone] at anytime and ask for Cardiology Fellow on call.  They are available 24 hours a day.  You may also contact the Cardiology Clinic after hours number at [phone].                                                       Telephone Numbers [phone] Monday-Friday 8:00 am to 4:30 pm    [phone] After 4:30 pm Monday-Friday, Weekends & Holidays  Ask for Interventional Cardiologist on call. Someone is on call 24 hours/day   [facility] toll free number [phone] Monday-Friday 8:00 am to 4:30 pm   [facility] Emergency Dept [phone]

## 2023-08-07 NOTE — PROVIDER NOTIFICATION
MD Notification    Notified Person: MD    Notified Person Name: Dakota    Notification Date/Time: 8/7/23 10:04    Notification Interaction: page    Purpose of Notification: Pt took warfarin last night at 1800. His INR is 2.49. Is pt OK to proceed with procedure?    Orders Received: pending

## 2023-08-07 NOTE — PROGRESS NOTES
Pt arrived for RHC. VSS on RA, denies pain. LVAD numbers documented. Lidocaine to [facility] site, covered with a Tegaderm. Pt took warfarin last night @ 1800; INR 2.49- will let MD know. H&P updated. Waiting for consent to be signed. Wife Cheryl at the bedside.    [Initial Visit] : an initial visit for [Family Member] : family member [FreeTextEntry2] : right hip and right knee pain.

## 2023-08-07 NOTE — Clinical Note
Called to [facility] on 2A. All questions answered. All belongings sent with patient. Patient transported to 2A via ambulation with CCL RN.

## 2023-08-09 ENCOUNTER — TELEPHONE (OUTPATIENT)
Dept: PALLIATIVE MEDICINE | Facility: OTHER | Age: 58
End: 2023-08-09

## 2023-08-09 NOTE — PROGRESS NOTES
Date:08/10/2023      COMPREHENSIVE PAIN CLINIC FOLLOW UP EVALUATION    Mr. Akshat Fragoso had initial consult on 5/18/2023 in the [facility] in consult for Dr. Rashid with regards to his pain. The patient is a 58 year old male with past medical history of LVAD, on chronic anticoagulation, DM type II, CHF class III, HTN, nonischemic cardiomyaopathhy, paroxysmal atrial fibrillation, HLD, COPD who presents for evaluation of chronic pain.  UDS was appropriate on 05/18/023.      Updates since initial consult on 05/18/2023:  He was suppose to follow up: 2 wks to review lumbar imaging and recommendations.  PCP prescribed oxycodone 5mg 10 tabs at a time.  He stopped gabapentin due to dry mouth.  He was referred for intercostal nerve block and this was not done. He just wants medication.  He has his first PT appointment scheduled in the future.  He is waiting for a heart transplant.    Interventions/Injections: R) heart cath    Progress notes reviewed:  07/25/2023 Dr. Shaun Aguiar    Any hospitalizations/ER/UC visits since last appointment: R) heart Cath.  Any falls since last appointment:  no    He has to lay on his back due to LVAD placement and this has aggravated his sciatica.  The patient describes the pain as  Sciatica: constant sitting on a baseball that radiates down the legs in the feet L>R, dull achey.  He denies any numbness or tingling.       Primary Pain : ribs due to CPR  He has VT/VF arrest on 03/15/2023.  LVAD implanted 03/23/2023.  Pain interferes with ADL's and sleep    Characteristics:  Ribs: sharp, difficulty breathing, deep breaths, ribs are .  He denies burning,electrical pain.     No changes in pain characteristics since last appointment.    What makes the pain better:  His pain is improved with heat, tylenol, oxycodone, stretching/walking.   What makes the pain worse:  He reports that the pain is made worse by sitting too long, laying on back too long.   Current pain on 0/10  VAS: 8   Worst pain: 9   Best pain:  6   05/18/2023 He rates his average pain score at 8/10, but it can be as low as 7/10 or as severe as 9/10.       Current Pain Related Medications:  Any medications changes since last appointment:  yes  He discontinued gabapentin because it made his mouth too dry.    Opioids: oxycodone 5mg PRN - 10 tabs every 10-12 days  Muscle relaxants:  Acetaminophen/NSAID: acetaminophen 650mg 2 tabs PRN  TCA:  Anticonvulsant: He was given a script for gabapentin 300mg and instructions on how to titrate up for a goal of 2 tabs TID  Antidepressant:  Benzodiazapine:  OTC:  Heat/Ice - as needed  Sleep medications:      Therapies Discussed on initial consult :   Physical therapy, Pain Psychology, TENs unit, Grounding Mat, Frequency Specific Micro Current, Anti-inflammatory Lifestyle    Exercise:  walks around [place]    Hobbies: fish    Mental Health:  psychologist once a month      Plan 05/18/2023:   Diagnostics: CT chest and abdomen was reviewed today demonstrating healing rib fractures.  There are no images of lumbar spine.      Medications:  Increase gabapentin by one 300mg capsule each week until taking gabapentin 300mg 2 capsules TID.    He does not tolerated muscle relaxants.    Oxycodone 5mg per PCP.  Will obtain UDS today.    The following OTC pain medications may be helpful, use as directed: Voltaren Gel 1% CBD products, Australian Dream Cream, Capsaicin products, Arnica cream, Lidocaine Patch, Solanpas, Biofreeze, Aspercream, Tiger Balm and Sandeep Emu cream.  Apply heat or cold PRN.      Therapies:  Discussed reffer to [facility] and he declined.    Refer for physical therapy [facility] appointment has been scheduled in the future.  Discussed the importance of core strengthening, ROM, stretching exercises with the patient and how each of these entities is important in decreasing pain. Explained to the patient  that the purpose of physical therapy is to teach the patient a home exercise program. These exercises need to be performed every day in order to decrease pain and prevent future occurrences of pain.        Discussed acupuncture and he will think about it.    Discussed Grounding Mat - handout provided - [email or web address]    Interventions:  Intercostal nerve block to treat L) rib pain.  He reports he does not want injections just oxycodone.      Follow up: as needed.    ---------------------------------------------------------------------------------------------------------------------------------------------------  History of pain on initial consult 05/18/2023:  Patient endorses left rib pain due to chest compressions/CPR.  Imaging was reviewed and he has healing anterolateral rib fractures.  He has VT/VF arrest on 03/15/2023.  LVAD implanted 03/23/2023.  He has to lay on his back due to LVAD placement and this has aggravated his sciatica.  The patient describes the pain as ribs: sharp, difficulty breathing, deep breaths, ribs are    Sciatica: constant sitting on a baseball that radiates down the legs in the feet L>R, dull achey.  He denies any numbness or tingling. He denies burning,electrical pain.  He reports that the pain is made worse by sitting too long, laying on back too long.  His pain is improved with heat, tylenol, oxycodone, stretching/walking.  He has reduced exercise stamina since LVAD placement.  Pain interferes with ADL's and sleep.  He did a lot of PT in the hospital.  He rates his average pain score at 8/10, but it can be as low as 7/10 or as severe as 9/10. He report sciatica pain was treated at [facility] years ago with IM steroids.    He denies any new problems with falls or balance, any new numbness or weakness of the arms or legs, any new bowel or bladder incontinence, any night sweats or unexplained fevers, or any sudden or unexpected weight loss.      05/16/2023, 05/09/2023 progress note reviewed Dr. Shaun Aguiar, Cardiology  05/03/2023 progress note reviewed Mile Bolivar, NP Cardiology    Akshat Fragoso has not been seen at a pain clinic in the past.        Patient Supplied Answers To the  Pain Questionnaire      5/17/2023     2:14 PM    Pain -  Patient Entered Questionnaire/Answers   What number best describes your pain right now:  0 = No pain  to  10 = Worst pain imaginable 9   How would you describe the pain dull, aching    throbbing    pressure   Which of the following worsen your pain standing    sitting    walking    exercise    coughing / sneezing   Which of the following improve or reduce your pain lying down    medication   What number best describes your average pain for the past week:  0 = No pain  to  10 = Worst pain imaginable 9   What number best describes your LOWEST pain in past 24 hours:  0 = No pain  to  10 = Worst pain imaginable 9   What number best describes your WORST pain in past 24 hours:  0 = No pain  to  10 = Worst pain imaginable 10   When is your pain worst Constant       Current Treatments:  Acetaminophen 1000mg TID  Gabapentin 300mg TID  Oxycodone 5mg TID - only for severe pain  Miralax/Senna  Warfarin        Previous Medication Treatments:  Anti-convulsants: none  Muscle relaxors: Methocarbamol 500mg QID  Anti-depressants: none  Acetaminophen/NSAIDs: can't take NSAIDs on coumadin, acetaminophen is helpful  Topicals: lidocaine patch - not helpful  Opioids: oxycodone is helpful  Marijuana cannot use due to on heart transplant waiting list    Other Treatments:  Physical therapy: In the hospital  Pain Psychology: no  Chiropractic: no  Acupuncture: no  TENs Unit: no  Injections: none  Steroid injection [facility]  Surgeries: LVAD placement 03/23/2023    Past Medical History:  Medical history reviewed.   Past Medical History:   Diagnosis Date    Acute right lumbar radiculopathy 11/02/2015    Acute systolic heart  failure (H) 01/10/2017    Cardiomyopathy (H) 01/10/2017    CKD (chronic kidney disease) stage 3, GFR 30-59 ml/min (H) 01/30/2020    JOHNSON (dyspnea on exertion)     ED (erectile dysfunction) 10/02/2019    Erectile dysfunction     ICD (implantable cardioverter-defibrillator) in place- [employer] single chamber- NOT dependent 10/09/2017    Personal history of smoking 01/04/2017    Pulmonary nodules 01/17/2017    CT 11/2018:  Bilateral pulmonary nodules measuring up to 4 mm. No new or enlarging pulmonary nodules.      Patient Active Problem List   Diagnosis    CHF (congestive heart failure) (H)    Cardiomyopathy, nonischemic (H)    Personal history of tobacco use, presenting hazards to health    ICD (implantable cardioverter-defibrillator) in place- [employer], single chamber- NOT dependent    Chronic systolic heart failure (H)    JOHNSON (dyspnea on exertion)    Acute on chronic systolic congestive heart failure (H)    Hypertrophic nonobstructive cardiomyopathy (H)    Other ill-defined heart diseases    Erectile dysfunction, unspecified erectile dysfunction type    COPD, severe (H)    Ventricular tachycardia (H)    LVAD (left ventricular assist device) present (H)    Chronic systolic congestive heart failure (H)    Long term use of drug    Left ventricular assist device present (H)    Anticoagulated on Coumadin       Past Surgical History:  Pertinent surgical history reviewed.   Past Surgical History:   Procedure Laterality Date    CARDIAC SURGERY  2016    defibrillator placement    COLONOSCOPY N/A 3/22/2023    Procedure: Colonoscopy;  Surgeon: Khushboo Crespo MD;  Location:  GI    CV INTRA AORTIC BALLOON N/A 3/22/2023    Procedure: Intraprocedure Aortic Balloon Pump Insertion;  Surgeon: Danie Snyder MD;  Location:  HEART CARDIAC CATH LAB    CV RIGHT HEART CATH MEASUREMENTS RECORDED N/A 11/20/2019    Procedure: CV RIGHT HEART CATH;  Surgeon: Jeff Aguilar MD;  Location:   HEART CARDIAC CATH LAB    CV RIGHT HEART CATH MEASUREMENTS RECORDED N/A 3/22/2023    Procedure: Right Heart Catheterization;  Surgeon: Danie Snyder MD;  Location:  HEART CARDIAC CATH LAB    INSERT VENTRICULAR ASSIST DEVICE LEFT (HEARTMATE II) N/A 3/23/2023    Procedure: MEDIAN STERNOTOMY, TRANSESOPHAGEAL ECHOCARDIOGRAM PER ANESTHESIA, CARDIOPULMONARY BYPASS PUMP, INSERTION, LEFT VENTRICULAR ASSIST DEVICE (HEARTMATE IIl), TRICUSPID VALVE REPAIR WITH EDWRDS 32 MM MC3 TRICUSPID ANNULOPLASTY RING;  Surgeon: Elena Matias MD;  Location:  OR    None          Medications: Pertinent medications reviewed.  Current Outpatient Medications   Medication Sig Dispense Refill    acetaminophen (TYLENOL) 325 MG tablet Take 2 tablets (650 mg) by mouth every 4 hours as needed for other (For optimal non-opioid multimodal pain management to improve pain control.) 100 tablet 0    amiodarone (PACERONE) 200 MG tablet Take 1 tablet (200 mg) by mouth daily 30 tablet 11    amoxicillin (AMOXIL) 500 MG tablet Take 4 tablets (2,000 mg) by mouth as needed (Take one hour before dental procedure.) 4 tablet 0    aspirin (ASA) 81 MG chewable tablet 1 tablet (81 mg) by Oral or NG Tube route daily 90 tablet 3    bumetanide (BUMEX) 1 MG tablet Take 1mg every Monday, Wednesday, and Friday (Patient not taking: Reported on 5/9/2023) 30 tablet 0    digoxin (LANOXIN) 250 MCG tablet Take 1 tablet (250 mcg) by mouth daily 30 tablet 11    empagliflozin (JARDIANCE) 10 MG TABS tablet Take 1 tablet (10 mg) by mouth daily 90 tablet 3    gabapentin (NEURONTIN) 300 MG capsule 1 capsule (300 mg) by Oral or Feeding Tube route 3 times daily 90 capsule 0    lisinopril (ZESTRIL) 5 MG tablet Take 1 tablet (5 mg) by mouth daily 90 tablet 3    methocarbamol (ROBAXIN) 500 MG tablet Take 1 tablet (500 mg) by mouth 4 times daily 60 tablet 0    nystatin (MYCOSTATIN) 325448 UNIT/ML suspension Take 5 mLs (500,000 Units) by mouth 4 times daily 100 mL 0     oxyCODONE (ROXICODONE) 5 MG tablet Take 1 tablet (5 mg) by mouth every 8 hours as needed for severe pain 10 tablet 0    pantoprazole (PROTONIX) 20 MG EC tablet Take 1 tablet (20 mg) by mouth every morning (before breakfast) 30 tablet 0    polyethylene glycol (MIRALAX) 17 GM/Dose powder Take 17 g by mouth daily 510 g 0    senna-docusate (SENOKOT-S/PERICOLACE) 8.6-50 MG tablet Take 2 tablets by mouth 2 times daily as needed for constipation 40 tablet 0    spironolactone (ALDACTONE) 25 MG tablet Take 0.5 tablets (12.5 mg) by mouth daily 90 tablet 3    warfarin ANTICOAGULANT (COUMADIN) 5 MG tablet Take 1 tablet (5 mg) by mouth at 6 pm on 4/9 and have INR check 4/10 and have dose adjusted 60 tablet 0       [place] Prescription Monitoring Program reviewed 8/10/2023.  No concern for abuse or misuse of controlled medications based on this report.  MME <50.  08/02/2023 oxycodone 5mg 10 tabs for 3 days  07/21/2023 oxycodone 5mg 10 tabs for 4 days  07/12/2023 oxycodone 5mg 10 tabs for 3 days   07/01/2023 oxycodone 5mg 10 tabs for 3 days  06/12/2023 oxycodone 5mg 10 tabs for 3 days  06/06/2023 oxycodone 5mg 10 tabs for 4 days  05/22/2023 oxycodone 5mg 10 tabs for 3 days  05/22/2023 gabapentin 300mg 180 tabs for 30 days  05/11/2023 oxycodone 5mg 10 tabs for 3 days. Darian Estevez MD  05/01/2023 oxycodone 5mg 10 tabs for 4 days. Darian Estevez MD      Allergies: Pertinent allergies reviewed.     Allergies   Allergen Reactions    Codeine      Other reaction(s): GI intolerance, Intolerance-Can't Take       Family History:   family history includes Anorexia nervosa in his daughter; Atrial fibrillation in his father; Heart Failure in his father; Other - See Comments in his granddaughter; Parkinsonism in his mother; Prostate Cancer in his father; Skin Cancer in his father.    Social History:   He is  with 2 children.  He no longer uses marijuana since he is on the heart transplant waiting list.  He is self employed in construction  "business.  He  reports that he has quit smoking. His smoking use included cigarettes. He has a 3.75 pack-year smoking history. He quit smokeless tobacco use about 2 months ago. He reports that he does not drink alcohol and does not use drugs.  Social History     Social History Narrative    Not on file         Review of Systems:      (Positive responses bolded)  GENERAL: fever/chills, fatigue, general unwell feeling, weight gain/loss.  Deconditioned.  HEAD/EYES:  headache, dizziness, or vision changes.    EARS/NOSE/THROAT: nosebleeds, hearing loss, sinus infection, earache, tinnitus.  IMMUNE:  allergies, cancer, immune deficiency, or infections.  SKIN:  itching, rash, hives  HEME/Lymphatic: anemia, easy bruising, easy bleeding.  RESPIRATORY: cough, wheezing, or shortness of breath  CARDIOVASCULAR/Circulation: extremity edema, syncope, hypertension, tachycardia, or angina.  GASTROINTESTINAL: abdominal pain, nausea/emesis, diarrhea, constipation,  hematochezia, or melena.  ENDOCRINE:  diabetes, steroid use, thyroid disease or osteoporosis.  MUSCULOSKELETAL: myalgias, joint pain, stiffness, neck pain, back pain, arthritis, or gout.  GENITOURINARY: frequency, urgency, dysuria, difficulty voiding, hematuria or incontinence.  NEUROLOGIC: weakness, numbness, paresthesias, seizure, tremor, stroke or memory loss.  PSYCHIATRIC: depression, anxiety, stress, suicidal thoughts or mood swings.     Physical Exam:  /61   Pulse 80   Ht 1.715 m (5' 7.5\")   Wt 79.6 kg (175 lb 6.4 oz)   SpO2 94%   BMI 27.07 kg/m      Physical Exam   Constitutional: He is oriented to person, place, and time.  He appears well-developed and well-nourished. He is not in acute distress.   HENT:     Head: Normocephalic and atraumatic.     Eyes: Pupils are equal, round, and reactive to light. EOM are normal. No scleral icterus.   Pulmonary/Chest:  NWOB. No respiratory distress.   Neurological: He is alert and oriented to person, place, and time. " Coordination grossly normal. Romberg test negative. Carlin's test is negative.  Skin: Skin is warm and dry. He is not diaphoretic. Multiple well healed surgical scars on abdomen.  Psychiatric: He has a normal mood and affect. His behavior is normal. Judgment and thought content normal.  He answers questions appropriately  MSK: Gait is normal.  He can walk on his toes, heals, heal toe walk and perform heal to shin testing without difficulty.  Pain with palpation to left ribs.    Imaging:  Narrative & Impression   EXAM: CT LUMBAR SPINE W/O CONTRAST  6/8/2023 1:54 PM      HISTORY: c/o pain from buttock shooting down b/l legs; Chronic  intractable pain; Lumbar radiculopathy; Chronic bilateral low back  pain, unspecified whether sciatica present; Chronic bilateral low back  pain, unspecified whether sciatica present        COMPARISON:  CT chest abdomen and pelvis 4/18/2023     TECHNIQUE: Using multidetector thin collimation helical acquisition  technique, axial, sagittal and coronal 3 mm thickness CT  reconstructions were obtained through the lumbar spine without  intravenous contrast.  Images were viewed in bone and soft tissue  windows.     FINDINGS:  There are 5 lumbar type vertebrae, used for the purposes of this  dictation. Normal vertebral body alignment. No significant disc space  narrowing. Multilevel facet hypertrophy. No acute fracture. No  suspicious osseous lesion.     Findings on a level by level basis are as follows:     L1-L2: No spinal canal or neural foraminal stenosis     L2-L3: No spinal canal or neural foraminal stenosis.     L3-L4: No spinal canal stenosis. Mild bilateral neural foraminal  narrowing.     L4-L5: Disc bulge. No spinal canal stenosis. Mild bilateral neural  foraminal narrowing.     L5-S1: Disc bulge. No spinal canal stenosis. Mild bilateral neural  foraminal narrowing.     The visualized adjacent paraspinous tissues are grossly within normal  limits. Partially visualized small left  pleural effusion with adjacent  atelectasis. Aortoiliac atherosclerotic calcifications.                                                                      IMPRESSION:  1.  Multilevel mild degenerative changes of the lumbar spine with  facet hypertrophy. No high-grade spinal canal or neural foraminal  stenosis.  2.  Partially visualized small left pleural effusion, which appears  decreased in size compared to 4/18/2023 chest imaging.     I have personally reviewed the examination and initial interpretation  and I agree with the findings.       EXAMINATION: CT CHEST ABDOMEN PELVIS W/O CONTRAST, 4/18/2023 5:15 PM     TECHNIQUE:  Helical CT images from the thoracic inlet through the  symphysis pubis were obtained without IV contrast.      COMPARISON: 4/3/2023     HISTORY: LVAD (left ventricular assist device) present (H); Cough,  unspecified type; Leukocytosis, unspecified type     FINDINGS:  CHEST:  LUNGS:   Stable small left pleural effusion and associated passive compressive  atelectasis of the inferior left upper lobe and the left lower lobe.  Mild central bronchial wall thickening.  No new focal consolidation or groundglass opacity.     MEDIASTINUM:   Unchanged postsurgical mediastinal fat stranding and mild pericardial  thickening. No pericardial effusion.  No thoracic lymphadenopathy. Thyroid is unremarkable.     Cardiac defibrillator with right ventricular lead in the expected  position.  Tricuspid valve repair.  Unchanged left ventricular assist device.  Moderate ventriculomegaly.  Moderate coronary artery calcifications.     ABDOMEN/PELVIS:  LIVER:  Normal.     STOMACH:   Decompressed which limits evaluation     BILIARY:   No biliary ductal dilation. Normal gallbladder.     PANCREAS:   Normal.     SPLEEN:   Normal.     ADRENAL GLANDS:   Normal.     :   Normal kidneys.  Non-thickened urinary bladder.     BOWEL/PERITONEUM:   Normal caliber of the small and large bowel.   Normal appendix.  No  pneumoperitoneum. No free fluid.  Colonic diverticulosis.     RETROPERITONEUM/:  Normal.     VESSELS:  Limited evaluation on non-contrast exam.     LYMPH NODES:   No lymphadenopathy.     BONES/SOFT TISSUES:   No acute osseous abnormality. Median sternotomy changes. Healing  bilateral anterolateral rib fractures.                                                                      IMPRESSION:   1.  No acute abnormality.  2.  Stable exam compared to 4/3/2023 with continued small left pleural  effusion.      I have personally reviewed the examination and initial interpretation  and I agree with the findings.         Chest 2 views     INDICATION: Follow consolidation     COMPARISON: 3/29/2023     FINDINGS: Heart size normal. LVAD. Implantable cardiac defibrillator.  Lungs and pulmonary vasculature otherwise appear grossly unchanged.  Mildly prominent thoracic kyphosis.                                                                      IMPRESSION: Unchanged possible left lower lobe  consolidation/atelectasis with possible superimposed pleural effusion.  This area is obscured by the LVAD the frontal view but persists  unchanged on the lateral view.     FANNY MELARA MD         SYSTEM ID:  [id number]      EMG:  none    Diagnosis:  (M47.816) Lumbar facet arthropathy  (primary encounter diagnosis)  Comment: CT lumbar spine reviewed in detail using a model. Mild facet arthropathy.  Plan: Keep PT appointments.    (R07.81) Rib pain on left side  Comment: L) rib fx healing from CPR 03/2023  Plan: offered intercostal nerve blk and OTC topical medication.          (G89.29) Chronic intractable pain  Comment: oxycodone 5mg PRN PCP          (Z79.01) Anticoagulated on Coumadin  Comment: He bruises easily.  Plan: Labs per [facility]    (Z95.811) LVAD (left ventricular assist device) present (H)  Comment: managed by [facility]    (F11.90) Chronic, continuous use of opioids  Comment: per PCP             Plan 08/10/2023:  Discussed multimodal plan to treat the pain.  He is not interested in other therapies to manage pain.  He is focused on opioids and would prefer oxycodone 5mg BID.  Opioids can be managed per PCP.    He needs to go to PT and continue to do exercises on a daily basis to maintain strength, ROM, flexibility.    Therapies Discussed on initial consult:   Physical therapy, Pain Psychology, TENs unit, Grounding Mat, Frequency Specific Micro Current, Anti-inflammatory Lifestyle    Reviewed lumbar CT 06/08/2023  IMPRESSION:  1.  Multilevel mild degenerative changes of the lumbar spine with  facet hypertrophy. No high-grade spinal canal or neural foraminal  stenosis. No irritation of any nerve at any level.  Keep appointment for PT.    2. Rib pain should be treated with intercostal nerve block and topical OTC medications.  He is refusing injections.  The following OTC pain medications may be helpful, use as directed: Voltaren Gel 1%, CBD products, Capsaicin products, Australian Dream Cream, [employer]pson It, Arnica products, Lidocaine Patch, Solanpas, Biofreeze, Aspercream, Tiger Balm and Sandeep Emu cream.  Apply heat or cold PRN.  TENs unit may be helpful.      Medications:  Continue oxycodone 5mg PRN per PCP  Acetaminophen 650mg 2 tabs TID      VERONICA Allen, RN, CNP, FNP  [facility] [place] [facility] [place]ING TIME DOCUMENTATION:   The total TIME spent on this patient on the date of the encounter/appointment was 20 minutes.

## 2023-08-10 ENCOUNTER — OFFICE VISIT (OUTPATIENT)
Dept: PALLIATIVE MEDICINE | Facility: CLINIC | Age: 58
End: 2023-08-10
Attending: NURSE PRACTITIONER
Payer: COMMERCIAL

## 2023-08-10 VITALS
SYSTOLIC BLOOD PRESSURE: 85 MMHG | DIASTOLIC BLOOD PRESSURE: 60 MMHG | HEART RATE: 109 BPM | BODY MASS INDEX: 28.19 KG/M2 | WEIGHT: 180 LBS | OXYGEN SATURATION: 95 %

## 2023-08-10 DIAGNOSIS — M54.16 LUMBAR RADICULOPATHY: ICD-10-CM

## 2023-08-10 DIAGNOSIS — G89.29 CHRONIC INTRACTABLE PAIN: ICD-10-CM

## 2023-08-10 DIAGNOSIS — G89.29 CHRONIC BILATERAL LOW BACK PAIN, UNSPECIFIED WHETHER SCIATICA PRESENT: ICD-10-CM

## 2023-08-10 DIAGNOSIS — R07.81 RIB PAIN ON LEFT SIDE: ICD-10-CM

## 2023-08-10 DIAGNOSIS — M54.50 CHRONIC BILATERAL LOW BACK PAIN, UNSPECIFIED WHETHER SCIATICA PRESENT: ICD-10-CM

## 2023-08-10 DIAGNOSIS — M47.816 LUMBAR FACET ARTHROPATHY: Primary | ICD-10-CM

## 2023-08-10 PROCEDURE — 99214 OFFICE O/P EST MOD 30 MIN: CPT | Performed by: NURSE PRACTITIONER

## 2023-08-10 ASSESSMENT — PAIN SCALES - GENERAL: PAINLEVEL: EXTREME PAIN (8)

## 2023-08-10 NOTE — PATIENT INSTRUCTIONS
Clinic Number:  [phone]   Call with any questions about your care and for scheduling assistance.   Calls are returned Monday through Friday between 8 AM and 4:30 PM. We usually get back to you within 2 business days depending on the issue/request.    If we are prescribing your medications:  For opioid medication refills, call the clinic or send a [employer] message 7 days in advance.  Please include:  Name of requested medication  Name of the pharmacy.  For non-opioid medications, call your pharmacy directly to request a refill. Please allow 3-4 days to be processed.   Per [place] State Law:  All controlled substance prescriptions must be filled within 30 days of being written.    For those controlled substances allowing refills, pickup must occur within 30 days of last fill.      We believe regular attendance is key to your success in our program!    Any time you are unable to keep your appointment we ask that you call us at least 24 hours in advance to cancel.This will allow us to offer the appointment time to another patient.   Multiple missed appointments may lead to dismissal from the clinic.      Plan 08/10/2023:  Discussed multimodal plan to treat the pain.  He is not interested in other therapies to manage pain.  He is focused on opioids and would prefer oxycodone 5mg BID.  Opioids can be managed per PCP.    He needs to go to PT and continue to do exercises on a daily basis to maintain strength, ROM, flexibility.    Therapies Discussed on initial consult:   Physical therapy, Pain Psychology, TENs unit, Grounding Mat, Frequency Specific Micro Current, Anti-inflammatory Lifestyle    Reviewed lumbar CT 06/08/2023  IMPRESSION:  1.  Multilevel mild degenerative changes of the lumbar spine with  facet hypertrophy. No high-grade spinal canal or neural foraminal  stenosis. No irritation of any nerve at any level.  Keep appointment for PT.    2. Rib pain should be treated with intercostal nerve block and topical OTC  medications.  He is refusing injections.  The following OTC pain medications may be helpful, use as directed: Voltaren Gel 1%, CBD products, Capsaicin products, Australian Dream Cream, Epson It, Arnica products, Lidocaine Patch, Solanpas, Biofreeze, Aspercream, Tiger Balm and Sandeep Emu cream.  Apply heat or cold PRN.  TENs unit may be helpful.      Medications:  Continue oxycodone 5mg PRN per PCP  Acetaminophen 650mg 2 tabs TID      VERONICA Allen, RN, CNP, FNP  [facility]

## 2023-08-11 DIAGNOSIS — Z95.811 LVAD (LEFT VENTRICULAR ASSIST DEVICE) PRESENT (H): ICD-10-CM

## 2023-08-11 NOTE — TELEPHONE ENCOUNTER
" oxyCODONE (ROXICODONE) 5 MG tablet   Last Written Prescription Date:  8/2/23  Last Fill Quantity: 10,   # refills: 0  Last Office Visit : 5/1/23  Future Office visit:  9/8/23    Routing refill request to provider for review/approval because:  Controlled substance. Last refill: 8/2/23 #10/0 RF \"One refill signed to last until pain clinic appointment. \"  8/10/23 Pain clinic note unsigned, no new prescriptions sent 8/10/23 by Pain team.   "

## 2023-08-11 NOTE — TELEPHONE ENCOUNTER
[facility] Call Center    Phone Message    May a detailed message be left on voicemail: yes     Reason for Call: Medication Refill Request    Has the patient contacted the pharmacy for the refill? Yes   Name of medication being requested:   oxyCODONE (ROXICODONE) 5 MG tablet   Provider who prescribed the medication:   Pharmacy:    Darian Estevez MD   [facility] [place], [place] - [address] [place] [address] [id number]      Date medication is needed ASAP if possible today 8/11/2023   Patient was told it may not be today and he understood.    Action Taken: Message routed to:  [facility] P[facility]    Travel Screening: Not Applicable

## 2023-08-11 NOTE — TELEPHONE ENCOUNTER
Messaged his pain provider to discuss further as I had not intended to continue the oxycodone long term.    Darian Estevez MD

## 2023-08-14 RX ORDER — OXYCODONE HYDROCHLORIDE 5 MG/1
TABLET ORAL
Qty: 10 TABLET | Refills: 0 | OUTPATIENT
Start: 2023-08-14

## 2023-08-14 RX ORDER — OXYCODONE HYDROCHLORIDE 5 MG/1
5 TABLET ORAL EVERY 8 HOURS PRN
Qty: 10 TABLET | Refills: 0 | OUTPATIENT
Start: 2023-08-14

## 2023-08-14 NOTE — TELEPHONE ENCOUNTER
"Left a message for patient, informed him of Dr. Estevez's recommendation.  Requested patient call back to make a follow up appt.     \"I discussed the oxycodone with his pain provider, and neither of us recommends continuing on the oxycodone long term.  Recommend that he follow-up with either the pain clinic or myself to discuss alternative treatment options.\"     Thanks,  MD Kristina Patterson RN on 8/14/2023 at 12:18 PM  "

## 2023-08-14 NOTE — TELEPHONE ENCOUNTER
[facility] Call Center    Phone Message    May a detailed message be left on voicemail: yes     Reason for Call: Other: Patient calling about the pain medication. Please call him to update him on the progress.      Action Taken: Message routed to:  [facility] LUCIEN    Travel Screening: Not Applicable

## 2023-08-14 NOTE — TELEPHONE ENCOUNTER
I discussed the oxycodone with his pain provider, and neither of us recommends continuing on the oxycodone long term.  Recommend that he follow-up with either the pain clinic or myself to discuss alternative treatment options.    Thanks,  Darian Estevez MD

## 2023-08-17 DIAGNOSIS — Z95.811 LVAD (LEFT VENTRICULAR ASSIST DEVICE) PRESENT (H): ICD-10-CM

## 2023-08-17 RX ORDER — WARFARIN SODIUM 5 MG/1
TABLET ORAL
Qty: 90 TABLET | Refills: 1 | Status: SHIPPED | OUTPATIENT
Start: 2023-08-17 | End: 2023-11-13

## 2023-08-17 NOTE — TELEPHONE ENCOUNTER
ANTICOAGULATION MANAGEMENT:  Medication Refill    Anticoagulation Summary  As of 8/7/2023      Warfarin maintenance plan:  2.5 mg (5 mg x 0.5) every Tue, Fri; 5 mg (5 mg x 1) all other days   Next INR check:  8/28/2023   Target end date:  Indefinite    Indications    Acute on chronic systolic congestive heart failure (H) [I50.23]  LVAD (left ventricular assist device) present (H) [Z95.811]  Chronic systolic congestive heart failure (H) [I50.22]  Long term use of drug [Z79.899]  Left ventricular assist device present (H) [Z95.811]  Anticoagulated on Coumadin [Z79.01]                 Anticoagulation Care Providers       Provider Role Specialty Phone number    Kenzie Moreau MD Referring Advanced Heart Failure and Transplant Cardiology [phone]    Mile Bolivar APRN CNP Referring Nurse Practitioner [phone]    Shaun Aguiar MD Referring Cardiovascular Disease [phone]            Refill Criteria    Visit with referring provider/group: Meets criteria: office visit within referring provider group in the last 1 year on 7/25/23    [facility] referral signed last signed: 04/25/2023; within last year: Yes    Lab monitoring not exceeding 2 weeks overdue: No    Akshat meets all criteria for refill. Rx instructions and quantity supplied updated to match patient's current dosing plan. Warfarin 90 day supply with 1 refill granted per [facility] protocol     TREV LYN RN  Anticoagulation Clinic

## 2023-08-22 ENCOUNTER — CARE COORDINATION (OUTPATIENT)
Dept: CARDIOLOGY | Facility: CLINIC | Age: 58
End: 2023-08-22
Payer: COMMERCIAL

## 2023-08-22 LAB — INR HOME MONITORING: 3.4 (ref 2–3)

## 2023-08-23 ENCOUNTER — ANTICOAGULATION THERAPY VISIT (OUTPATIENT)
Dept: ANTICOAGULATION | Facility: CLINIC | Age: 58
End: 2023-08-23
Payer: COMMERCIAL

## 2023-08-23 DIAGNOSIS — Z95.811 LVAD (LEFT VENTRICULAR ASSIST DEVICE) PRESENT (H): ICD-10-CM

## 2023-08-23 DIAGNOSIS — I50.22 CHRONIC SYSTOLIC CONGESTIVE HEART FAILURE (H): ICD-10-CM

## 2023-08-23 DIAGNOSIS — Z79.899 LONG TERM USE OF DRUG: ICD-10-CM

## 2023-08-23 DIAGNOSIS — I50.23 ACUTE ON CHRONIC SYSTOLIC CONGESTIVE HEART FAILURE (H): Primary | ICD-10-CM

## 2023-08-23 DIAGNOSIS — Z95.811 LEFT VENTRICULAR ASSIST DEVICE PRESENT (H): ICD-10-CM

## 2023-08-23 DIAGNOSIS — Z79.01 ANTICOAGULATED ON COUMADIN: ICD-10-CM

## 2023-08-23 NOTE — PROGRESS NOTES
Patient's spouse called reporting an INR of 3.4 on their first test of the their home INR monitor. Patient reported no signs and symptoms of bleeding. Informed patient and spouse that even though it is out of range, it is not something they should be overly concerned about. Instructed patient to monitor for signs and symptoms of bleeding, and if they do not hear from INR clinic by noon tomorrow to call INR clinic for coumadin instructions.

## 2023-08-23 NOTE — PROGRESS NOTES
ANTICOAGULATION MANAGEMENT     Akshat rFagoso 58 year old male is on warfarin with supratherapeutic INR result. (Goal INR 2.0-3.0)    Recent labs: (last 7 days)     08/22/23  0000   INR 3.4*       ASSESSMENT     Source(s): Chart Review  Previous INR was Therapeutic last 2(+) visits  Medication, diet, health changes since last INR chart reviewed;   See encounter from Dakota Deshpande, LVAD coordinator on call.  Agreed with coord, no change to Akshat's dosing pattern of Warfarin.  LVM for patient to please test in 1 week instead of 2 weeks (8/29/23)         PLAN     Recommended plan for no diet, medication or health factor changes affecting INR     Dosing Instructions: Continue your current warfarin dose with next INR in 1 week       Summary  As of 8/23/2023      Full warfarin instructions:  2.5 mg every Tue, Fri; 5 mg all other days   Next INR check:  8/29/2023               Detailed voice message left for Akshat with dosing instructions and follow up date.   Sent [employer] message with dosing and follow up instructions    Patient to recheck with home meter    Education provided:   Please call back if any changes to your diet, medications or how you've been taking warfarin    Plan made per [facility] anticoagulation protocol and per LVAD protocol    Erik Fermin, RN  Anticoagulation Clinic  8/23/2023    _______________________________________________________________________     Anticoagulation Episode Summary       Current INR goal:  2.0-3.0   TTR:  78.3 % (4.2 mo)   Target end date:  Indefinite   Send INR reminders to:  ANTICOAG LVAD    Indications    Acute on chronic systolic congestive heart failure (H) [I50.23]  LVAD (left ventricular assist device) present (H) [Z95.811]  Chronic systolic congestive heart failure (H) [I50.22]  Long term use of drug [Z79.899]  Left ventricular assist device present (H) [Z95.811]  Anticoagulated on Coumadin [Z79.01]             Comments:  Follow VAD Anticoag protocol:Yes: HeartMate 3    Bridging: Enoxaparin   Date VAD placed: 3/23/23             Anticoagulation Care Providers       Provider Role Specialty Phone number    Kenzie Moreau MD Referring Advanced Heart Failure and Transplant Cardiology [phone]    Mile Bolivar, VERONICA CNP Referring Nurse Practitioner [phone]    Shaun Aguiar MD Referring Cardiovascular Disease [phone]

## 2023-08-28 ENCOUNTER — CARE COORDINATION (OUTPATIENT)
Dept: CARDIOLOGY | Facility: CLINIC | Age: 58
End: 2023-08-28
Payer: COMMERCIAL

## 2023-09-02 LAB
MDC_IDC_LEAD_IMPLANT_DT: NORMAL
MDC_IDC_LEAD_LOCATION: NORMAL
MDC_IDC_LEAD_LOCATION_DETAIL_1: NORMAL
MDC_IDC_LEAD_MFG: NORMAL
MDC_IDC_LEAD_MODEL: NORMAL
MDC_IDC_LEAD_POLARITY_TYPE: NORMAL
MDC_IDC_LEAD_SERIAL: NORMAL
MDC_IDC_MSMT_BATTERY_DTM: NORMAL
MDC_IDC_MSMT_BATTERY_REMAINING_LONGEVITY: 132 MO
MDC_IDC_MSMT_BATTERY_STATUS: NORMAL
MDC_IDC_MSMT_CAP_CHARGE_DTM: NORMAL
MDC_IDC_MSMT_CAP_CHARGE_ENERGY: 0 J
MDC_IDC_MSMT_CAP_CHARGE_TIME: 0 S
MDC_IDC_MSMT_CAP_CHARGE_TIME: 10.39
MDC_IDC_MSMT_CAP_CHARGE_TYPE: NORMAL
MDC_IDC_MSMT_CAP_CHARGE_TYPE: NORMAL
MDC_IDC_MSMT_LEADCHNL_RV_IMPEDANCE_VALUE: 358 OHM
MDC_IDC_MSMT_LEADCHNL_RV_PACING_THRESHOLD_AMPLITUDE: 1.5 V
MDC_IDC_MSMT_LEADCHNL_RV_PACING_THRESHOLD_PULSEWIDTH: 0.4 MS
MDC_IDC_MSMT_LEADCHNL_RV_SENSING_INTR_AMPL: 1.9 MV
MDC_IDC_PG_IMPLANT_DTM: NORMAL
MDC_IDC_PG_MFG: NORMAL
MDC_IDC_PG_MODEL: NORMAL
MDC_IDC_PG_SERIAL: NORMAL
MDC_IDC_PG_TYPE: NORMAL
MDC_IDC_SESS_CLINIC_NAME: NORMAL
MDC_IDC_SESS_DTM: NORMAL
MDC_IDC_SESS_TYPE: NORMAL
MDC_IDC_SET_BRADY_HYSTRATE: NORMAL
MDC_IDC_SET_BRADY_LOWRATE: 40 {BEATS}/MIN
MDC_IDC_SET_BRADY_MODE: NORMAL
MDC_IDC_SET_LEADCHNL_RV_PACING_AMPLITUDE: 2.8 V
MDC_IDC_SET_LEADCHNL_RV_PACING_CAPTURE_MODE: NORMAL
MDC_IDC_SET_LEADCHNL_RV_PACING_POLARITY: NORMAL
MDC_IDC_SET_LEADCHNL_RV_PACING_PULSEWIDTH: 0.4 MS
MDC_IDC_SET_LEADCHNL_RV_SENSING_ADAPTATION_MODE: NORMAL
MDC_IDC_SET_LEADCHNL_RV_SENSING_POLARITY: NORMAL
MDC_IDC_SET_LEADCHNL_RV_SENSING_SENSITIVITY: 0.6 MV
MDC_IDC_SET_ZONE_DETECTION_INTERVAL: 300 MS
MDC_IDC_SET_ZONE_DETECTION_INTERVAL: 353 MS
MDC_IDC_SET_ZONE_TYPE: NORMAL
MDC_IDC_SET_ZONE_VENDOR_TYPE: NORMAL
MDC_IDC_STAT_BRADY_DTM_END: NORMAL
MDC_IDC_STAT_BRADY_DTM_START: NORMAL
MDC_IDC_STAT_BRADY_RV_PERCENT_PACED: 1 %
MDC_IDC_STAT_EPISODE_RECENT_COUNT: 0
MDC_IDC_STAT_EPISODE_RECENT_COUNT_DTM_END: NORMAL
MDC_IDC_STAT_EPISODE_RECENT_COUNT_DTM_START: NORMAL
MDC_IDC_STAT_EPISODE_TOTAL_COUNT: 12
MDC_IDC_STAT_EPISODE_TOTAL_COUNT: 13
MDC_IDC_STAT_EPISODE_TOTAL_COUNT: 9
MDC_IDC_STAT_EPISODE_TOTAL_COUNT_DTM_END: NORMAL
MDC_IDC_STAT_EPISODE_TYPE: NORMAL
MDC_IDC_STAT_EPISODE_VENDOR_TYPE: NORMAL
MDC_IDC_STAT_TACHYTHERAPY_ATP_DELIVERED_RECENT: 0
MDC_IDC_STAT_TACHYTHERAPY_ATP_DELIVERED_TOTAL: 1
MDC_IDC_STAT_TACHYTHERAPY_RECENT_DTM_END: NORMAL
MDC_IDC_STAT_TACHYTHERAPY_RECENT_DTM_START: NORMAL
MDC_IDC_STAT_TACHYTHERAPY_SHOCKS_ABORTED_RECENT: 0
MDC_IDC_STAT_TACHYTHERAPY_SHOCKS_ABORTED_TOTAL: 0
MDC_IDC_STAT_TACHYTHERAPY_SHOCKS_DELIVERED_RECENT: 0
MDC_IDC_STAT_TACHYTHERAPY_SHOCKS_DELIVERED_TOTAL: 0
MDC_IDC_STAT_TACHYTHERAPY_TOTAL_DTM_END: NORMAL

## 2023-09-05 ENCOUNTER — CARE COORDINATION (OUTPATIENT)
Dept: CARDIOLOGY | Facility: CLINIC | Age: 58
End: 2023-09-05

## 2023-09-05 NOTE — PROGRESS NOTES
D:  Called pt today to review protocol for substance abuse testing to achieve eligibility for transplant listing.   I:  LVM for pt to return call.

## 2023-09-07 ENCOUNTER — ANTICOAGULATION THERAPY VISIT (OUTPATIENT)
Dept: ANTICOAGULATION | Facility: CLINIC | Age: 58
End: 2023-09-07
Payer: COMMERCIAL

## 2023-09-07 DIAGNOSIS — Z95.811 LEFT VENTRICULAR ASSIST DEVICE PRESENT (H): ICD-10-CM

## 2023-09-07 DIAGNOSIS — I50.23 ACUTE ON CHRONIC SYSTOLIC CONGESTIVE HEART FAILURE (H): Primary | ICD-10-CM

## 2023-09-07 DIAGNOSIS — Z79.899 LONG TERM USE OF DRUG: ICD-10-CM

## 2023-09-07 DIAGNOSIS — I50.22 CHRONIC SYSTOLIC CONGESTIVE HEART FAILURE (H): ICD-10-CM

## 2023-09-07 DIAGNOSIS — Z79.01 ANTICOAGULATED ON COUMADIN: ICD-10-CM

## 2023-09-07 DIAGNOSIS — Z95.811 LVAD (LEFT VENTRICULAR ASSIST DEVICE) PRESENT (H): ICD-10-CM

## 2023-09-07 LAB — INR HOME MONITORING: 2.6 (ref 2–3)

## 2023-09-07 NOTE — PROGRESS NOTES
ANTICOAGULATION MANAGEMENT     Akshat Fragoso 58 year old male is on warfarin with therapeutic INR result. (Goal INR 2.0-3.0)    Recent labs: (last 7 days)     09/07/23  0000   INR 2.6       ASSESSMENT     Source(s): Chart Review     Warfarin doses taken: Reviewed in chart  Diet: No new diet changes identified  Medication/supplement changes: None noted  New illness, injury, or hospitalization: No  Signs or symptoms of bleeding or clotting: No  Previous result: Supratherapeutic  Additional findings: None       PLAN     Recommended plan for no diet, medication or health factor changes affecting INR     Dosing Instructions: Continue your current warfarin dose with next INR in 2 weeks       Summary  As of 9/7/2023      Full warfarin instructions:  2.5 mg every Tue, Fri; 5 mg all other days   Next INR check:  9/14/2023               Detailed voice message left for Akshat with dosing instructions and follow up date.     Patient to recheck with home meter    Education provided:   Please call back if any changes to your diet, medications or how you've been taking warfarin  Contact [phone] with any changes, questions or concerns.     Plan made per [facility] anticoagulation protocol and per LVAD protocol    Merry Reyes RN  Anticoagulation Clinic  9/7/2023    _______________________________________________________________________     Anticoagulation Episode Summary       Current INR goal:  2.0-3.0   TTR:  75.2 % (4.8 mo)   Target end date:  Indefinite   Send INR reminders to:  ANTICOAG LVAD    Indications    Acute on chronic systolic congestive heart failure (H) [I50.23]  LVAD (left ventricular assist device) present (H) [Z95.811]  Chronic systolic congestive heart failure (H) [I50.22]  Long term use of drug [Z79.899]  Left ventricular assist device present (H) [Z95.811]  Anticoagulated on Coumadin [Z79.01]             Comments:  Follow VAD Anticoag protocol:Yes: HeartMate 3   Bridging: Enoxaparin   Date VAD placed: 3/23/23              Anticoagulation Care Providers       Provider Role Specialty Phone number    Kenzie Moreau MD Referring Advanced Heart Failure and Transplant Cardiology [phone]    Mile Boilvar, APRN CNP Referring Nurse Practitioner [phone]    Shaun Aguiar MD Referring Cardiovascular Disease [phone]

## 2023-09-13 ENCOUNTER — TELEPHONE (OUTPATIENT)
Dept: TRANSPLANT | Facility: CLINIC | Age: 58
End: 2023-09-13
Payer: COMMERCIAL

## 2023-09-13 ENCOUNTER — CARE COORDINATION (OUTPATIENT)
Dept: CARDIOLOGY | Facility: CLINIC | Age: 58
End: 2023-09-13
Payer: COMMERCIAL

## 2023-09-13 NOTE — PROGRESS NOTES
LVAD Social Work Services Phone Call      Data: Received phone call from patient's wife asking for resources to help with medical bills and debt related to patient's medical illness and time off work.  Intervention: Talked with wife over the phone. Put together a list of resources that may help with financial planning, debt consolidation, , and information on awais care, etc... Emailed patient/wife list of resources today.  Assessment: Wife expressed financial hardship with monthly medical bills and other expenses since  has been unable to return to work due to his medical illness. Reports being on payment plans, but worried about future bills/costs and consolidating their debt.  Education provided by SW: Community resources  Plan:  SW will continue to follow for ongoing support and community resources

## 2023-09-18 ENCOUNTER — TELEPHONE (OUTPATIENT)
Dept: TRANSPLANT | Facility: CLINIC | Age: 58
End: 2023-09-18
Payer: COMMERCIAL

## 2023-09-18 ENCOUNTER — MYC MEDICAL ADVICE (OUTPATIENT)
Dept: ANTICOAGULATION | Facility: CLINIC | Age: 58
End: 2023-09-18
Payer: COMMERCIAL

## 2023-09-18 NOTE — PROGRESS NOTES
LVAD Social Work Services Phone Call      Data: Wife asking for support with one month of their Mortgage    Intervention: Received copy of mortgage statement. Completed [facility] shared care request for financial support today.    Assessment: Provided list of financial resources last week. Patient's wife verbalized understanding.  Education provided by : Community resources available  Plan:  Will continue to follow. [facility] will assist with mortgage in October.

## 2023-09-18 NOTE — TELEPHONE ENCOUNTER
ANTICOAGULATION     Akshat Fragoso is overdue for INR check.       Mychart reminder sent     TREV LYN RN

## 2023-09-21 ENCOUNTER — DOCUMENTATION ONLY (OUTPATIENT)
Dept: INTERNAL MEDICINE | Facility: CLINIC | Age: 58
End: 2023-09-21
Payer: COMMERCIAL

## 2023-09-21 ENCOUNTER — MYC MEDICAL ADVICE (OUTPATIENT)
Dept: INTERNAL MEDICINE | Facility: CLINIC | Age: 58
End: 2023-09-21
Payer: COMMERCIAL

## 2023-09-21 NOTE — PROGRESS NOTES
Type of Form Received: Disability Parking    Form Received (Date) 09/19/23   Form Filled out Yes, 9/20/23   Placed in provider folder Yes

## 2023-09-22 ENCOUNTER — ANTICOAGULATION THERAPY VISIT (OUTPATIENT)
Dept: ANTICOAGULATION | Facility: CLINIC | Age: 58
End: 2023-09-22
Payer: COMMERCIAL

## 2023-09-22 DIAGNOSIS — I50.22 CHRONIC SYSTOLIC CONGESTIVE HEART FAILURE (H): ICD-10-CM

## 2023-09-22 DIAGNOSIS — Z95.811 LEFT VENTRICULAR ASSIST DEVICE PRESENT (H): ICD-10-CM

## 2023-09-22 DIAGNOSIS — Z79.899 LONG TERM USE OF DRUG: ICD-10-CM

## 2023-09-22 DIAGNOSIS — Z95.811 LVAD (LEFT VENTRICULAR ASSIST DEVICE) PRESENT (H): ICD-10-CM

## 2023-09-22 DIAGNOSIS — Z79.01 ANTICOAGULATED ON COUMADIN: ICD-10-CM

## 2023-09-22 DIAGNOSIS — I50.23 ACUTE ON CHRONIC SYSTOLIC CONGESTIVE HEART FAILURE (H): Primary | ICD-10-CM

## 2023-09-22 LAB — INR HOME MONITORING: 2.4 (ref 2–3)

## 2023-09-22 NOTE — PROGRESS NOTES
ANTICOAGULATION MANAGEMENT     Akshat Fragoso 58 year old male is on warfarin with therapeutic INR result. (Goal INR 2.0-3.0)    Recent labs: (last 7 days)     09/22/23  0000   INR 2.4       ASSESSMENT     Source(s): Chart Review  Previous INR was Therapeutic last visit; previously outside of goal range  Medication, diet, health changes since last INR chart reviewed; none identified         PLAN     Recommended plan for no diet, medication or health factor changes affecting INR     Dosing Instructions: Continue your current warfarin dose with next INR in 2 weeks       Summary  As of 9/22/2023      Full warfarin instructions:  2.5 mg every Tue, Fri; 5 mg all other days   Next INR check:  10/6/2023               Detailed voice message left for Akshat with dosing instructions and follow up date.   Sent [employer] message with dosing and follow up instructions    Patient to recheck with home meter    Education provided:   Please call back if any changes to your diet, medications or how you've been taking warfarin    Plan made per [facility] anticoagulation protocol and per LVAD protocol    Erik Fermin, RN  Anticoagulation Clinic  9/22/2023    _______________________________________________________________________     Anticoagulation Episode Summary       Current INR goal:  2.0-3.0   TTR:  77.5 % (5.3 mo)   Target end date:  Indefinite   Send INR reminders to:  ANTICOAG LVAD    Indications    Acute on chronic systolic congestive heart failure (H) [I50.23]  LVAD (left ventricular assist device) present (H) [Z95.811]  Chronic systolic congestive heart failure (H) [I50.22]  Long term use of drug [Z79.899]  Left ventricular assist device present (H) [Z95.811]  Anticoagulated on Coumadin [Z79.01]             Comments:  Follow VAD Anticoag protocol:Yes: HeartMate 3   Bridging: Enoxaparin   Date VAD placed: 3/23/23             Anticoagulation Care Providers       Provider Role Specialty Phone number    Kenzie Moreau MD  Referring Advanced Heart Failure and Transplant Cardiology [phone]    Mile Bolivar, APRN CNP Referring Nurse Practitioner [phone]    Shaun Aguiar MD Referring Cardiovascular Disease [phone]

## 2023-09-26 NOTE — PROGRESS NOTES
DISCHARGE REPORT    Progress reporting period is from 6/12/23     SUBJECTIVE  Subjective changes noted by patient:  pt did not return for follow up care, condition unknown.         OBJECTIVE  Changes noted in objective findings:  Patient has failed to return to therapy so current objective findings are unknown.        ASSESSMENT/PLAN  STG/LTGs have been met or progress has been made towards goals: unknown  Assessment of Progress: The patient has not returned to therapy. Current status is unknown.  Self Management Plans:  Patient has been instructed in a home treatment program.  Patient  has been instructed in self management of symptoms.  Did not return for follow up care.  Akshat continues to require the following intervention to meet STG and LTG's:  did not return for follow up care    Recommendations:  This patient is ready to be discharged from therapy and continue their home treatment program.    Please refer to the daily flowsheet for treatment today, total treatment time and time spent performing 1:1 timed codes.

## 2023-09-27 ENCOUNTER — CARE COORDINATION (OUTPATIENT)
Dept: CARDIOLOGY | Facility: CLINIC | Age: 58
End: 2023-09-27
Payer: COMMERCIAL

## 2023-09-27 NOTE — PROGRESS NOTES
D:  Called pt for 24 week check in.  I:  LV for pt to return call to discuss Surveillance Testing so that patient can begin showing abstinence for transplant listing.  P:  VAD Coordinator available for questions or concerns.

## 2023-09-29 ENCOUNTER — TELEPHONE (OUTPATIENT)
Dept: ANTICOAGULATION | Facility: CLINIC | Age: 58
End: 2023-09-29
Payer: COMMERCIAL

## 2023-09-29 NOTE — TELEPHONE ENCOUNTER
9/29/23    Received communication from Akshat that he has not been able to connect with [employer] monitoring to reorder supplies.    See My chart message(s).    1000 am  Chapis calling back.  She will have someone contact patient.  Per her tracking number and orders on their end, strips have shipped and will arrive on Sat. 9/28/23.    Erik Fermin RN

## 2023-10-05 ENCOUNTER — MYC REFILL (OUTPATIENT)
Dept: CARDIOLOGY | Facility: CLINIC | Age: 58
End: 2023-10-05
Payer: COMMERCIAL

## 2023-10-05 DIAGNOSIS — I50.22 CHRONIC SYSTOLIC CONGESTIVE HEART FAILURE (H): ICD-10-CM

## 2023-10-05 DIAGNOSIS — Z95.811 LVAD (LEFT VENTRICULAR ASSIST DEVICE) PRESENT (H): ICD-10-CM

## 2023-10-06 RX ORDER — AMOXICILLIN 500 MG/1
2000 TABLET, FILM COATED ORAL PRN
Qty: 4 TABLET | Refills: 0 | Status: SHIPPED | OUTPATIENT
Start: 2023-10-06 | End: 2024-07-22

## 2023-10-06 NOTE — TELEPHONE ENCOUNTER
amoxicillin (AMOXIL) 500 MG   Last Written Prescription Date:  7/17/23  Last Fill Quantity: 4,   # refills: 0  Last Office Visit : 7/25/23  Future Office visit:  10/25/23  Routing refill request to provider for review/approval because:  Drug not on the refill protocol

## 2023-10-11 ENCOUNTER — MYC MEDICAL ADVICE (OUTPATIENT)
Dept: ANTICOAGULATION | Facility: CLINIC | Age: 58
End: 2023-10-11
Payer: COMMERCIAL

## 2023-10-15 LAB — INR HOME MONITORING: 2.4 (ref 2–3)

## 2023-10-16 ENCOUNTER — ANTICOAGULATION THERAPY VISIT (OUTPATIENT)
Dept: ANTICOAGULATION | Facility: CLINIC | Age: 58
End: 2023-10-16
Payer: COMMERCIAL

## 2023-10-16 DIAGNOSIS — Z79.01 ANTICOAGULATED ON COUMADIN: ICD-10-CM

## 2023-10-16 DIAGNOSIS — Z79.899 LONG TERM USE OF DRUG: ICD-10-CM

## 2023-10-16 DIAGNOSIS — Z95.811 LEFT VENTRICULAR ASSIST DEVICE PRESENT (H): ICD-10-CM

## 2023-10-16 DIAGNOSIS — I50.22 CHRONIC SYSTOLIC CONGESTIVE HEART FAILURE (H): ICD-10-CM

## 2023-10-16 DIAGNOSIS — Z95.811 LVAD (LEFT VENTRICULAR ASSIST DEVICE) PRESENT (H): ICD-10-CM

## 2023-10-16 DIAGNOSIS — I50.23 ACUTE ON CHRONIC SYSTOLIC CONGESTIVE HEART FAILURE (H): Primary | ICD-10-CM

## 2023-10-16 NOTE — PROGRESS NOTES
ANTICOAGULATION MANAGEMENT     Akshat Fragoso 58 year old male is on warfarin with therapeutic INR result. (Goal INR 2.0-3.0)    Recent labs: (last 7 days)     10/15/23  0000   INR 2.4       ASSESSMENT     Source(s): Chart Review  Previous INR was Therapeutic last 2(+) visits  Medication, diet, health changes since last INR chart reviewed; none identified         PLAN     Recommended plan for no diet, medication or health factor changes affecting INR     Dosing Instructions: Continue your current warfarin dose with next INR in 2 weeks       Summary  As of 10/16/2023      Full warfarin instructions:  2.5 mg every Tue, Fri; 5 mg all other days   Next INR check:  10/30/2023               Detailed voice message left for Akshat with dosing instructions and follow up date.   Sent [employer] message with dosing and follow up instructions    Patient to recheck with home meter    Education provided:   Please call back if any changes to your diet, medications or how you've been taking warfarin    Plan made per [facility] anticoagulation protocol and per LVAD protocol    Golden Lenz RN  Anticoagulation Clinic  10/16/2023    _______________________________________________________________________     Anticoagulation Episode Summary       Current INR goal:  2.0-3.0   TTR:  80.4% (6 mo)   Target end date:  Indefinite   Send INR reminders to:  ANTICOAG LVAD    Indications    Acute on chronic systolic congestive heart failure (H) [I50.23]  LVAD (left ventricular assist device) present (H) [Z95.811]  Chronic systolic congestive heart failure (H) [I50.22]  Long term use of drug [Z79.899]  Left ventricular assist device present (H) [Z95.811]  Anticoagulated on Coumadin [Z79.01]             Comments:  Follow VAD Anticoag protocol:Yes: HeartMate 3   Bridging: Enoxaparin   Date VAD placed: 3/23/23             Anticoagulation Care Providers       Provider Role Specialty Phone number    Kenzie Moreau MD Referring Advanced Heart Failure and  Transplant Cardiology [phone]    Mile Bolivar, VERONICA CNP Referring Nurse Practitioner [phone]    Shaun Aguiar MD Referring Cardiovascular Disease [phone]

## 2023-10-19 DIAGNOSIS — I50.22 CHRONIC SYSTOLIC CONGESTIVE HEART FAILURE (H): ICD-10-CM

## 2023-10-19 DIAGNOSIS — Z79.899 LONG TERM USE OF DRUG: Primary | ICD-10-CM

## 2023-10-19 DIAGNOSIS — Z95.811 LEFT VENTRICULAR ASSIST DEVICE PRESENT (H): ICD-10-CM

## 2023-10-20 DIAGNOSIS — Z95.811 LVAD (LEFT VENTRICULAR ASSIST DEVICE) PRESENT (H): ICD-10-CM

## 2023-10-20 RX ORDER — BUMETANIDE 1 MG/1
TABLET ORAL
Qty: 30 TABLET | Refills: 0 | Status: SHIPPED | OUTPATIENT
Start: 2023-10-20 | End: 2024-03-13

## 2023-10-26 ENCOUNTER — ANCILLARY PROCEDURE (OUTPATIENT)
Dept: CARDIOLOGY | Facility: CLINIC | Age: 58
End: 2023-10-26
Attending: INTERNAL MEDICINE
Payer: COMMERCIAL

## 2023-10-26 DIAGNOSIS — I42.9 CARDIOMYOPATHY (H): ICD-10-CM

## 2023-10-26 DIAGNOSIS — Z95.810 ICD (IMPLANTABLE CARDIOVERTER-DEFIBRILLATOR) IN PLACE: ICD-10-CM

## 2023-10-26 DIAGNOSIS — I50.22 CHRONIC SYSTOLIC HEART FAILURE (H): ICD-10-CM

## 2023-10-26 PROCEDURE — 93295 DEV INTERROG REMOTE 1/2/MLT: CPT | Performed by: INTERNAL MEDICINE

## 2023-10-26 PROCEDURE — 93296 REM INTERROG EVL PM/IDS: CPT

## 2023-10-27 ENCOUNTER — MYC MEDICAL ADVICE (OUTPATIENT)
Dept: OTHER | Age: 58
End: 2023-10-27

## 2023-10-27 ENCOUNTER — OFFICE VISIT (OUTPATIENT)
Dept: CARDIOLOGY | Facility: CLINIC | Age: 58
End: 2023-10-27
Attending: INTERNAL MEDICINE
Payer: COMMERCIAL

## 2023-10-27 ENCOUNTER — ANCILLARY PROCEDURE (OUTPATIENT)
Dept: GENERAL RADIOLOGY | Facility: CLINIC | Age: 58
End: 2023-10-27
Attending: INTERNAL MEDICINE
Payer: COMMERCIAL

## 2023-10-27 VITALS
HEIGHT: 67 IN | BODY MASS INDEX: 30.72 KG/M2 | OXYGEN SATURATION: 96 % | SYSTOLIC BLOOD PRESSURE: 84 MMHG | WEIGHT: 195.7 LBS

## 2023-10-27 DIAGNOSIS — Z95.810 ICD (IMPLANTABLE CARDIOVERTER-DEFIBRILLATOR) IN PLACE: ICD-10-CM

## 2023-10-27 DIAGNOSIS — T82.110A AICD LEAD MALFUNCTION: Primary | ICD-10-CM

## 2023-10-27 DIAGNOSIS — Z95.811 LVAD (LEFT VENTRICULAR ASSIST DEVICE) PRESENT (H): ICD-10-CM

## 2023-10-27 DIAGNOSIS — I50.22 CHRONIC SYSTOLIC CONGESTIVE HEART FAILURE (H): ICD-10-CM

## 2023-10-27 DIAGNOSIS — I50.22 CHRONIC SYSTOLIC HEART FAILURE (H): ICD-10-CM

## 2023-10-27 DIAGNOSIS — I42.9 CARDIOMYOPATHY (H): ICD-10-CM

## 2023-10-27 DIAGNOSIS — Z95.810 ICD (IMPLANTABLE CARDIOVERTER-DEFIBRILLATOR) IN PLACE: Primary | ICD-10-CM

## 2023-10-27 DIAGNOSIS — I47.20 VENTRICULAR TACHYCARDIA (H): ICD-10-CM

## 2023-10-27 PROCEDURE — 93282 PRGRMG EVAL IMPLANTABLE DFB: CPT | Performed by: INTERNAL MEDICINE

## 2023-10-27 PROCEDURE — 99417 PROLNG OP E/M EACH 15 MIN: CPT | Performed by: INTERNAL MEDICINE

## 2023-10-27 PROCEDURE — G0463 HOSPITAL OUTPT CLINIC VISIT: HCPCS | Performed by: INTERNAL MEDICINE

## 2023-10-27 PROCEDURE — 99215 OFFICE O/P EST HI 40 MIN: CPT | Mod: 25 | Performed by: INTERNAL MEDICINE

## 2023-10-27 PROCEDURE — 71046 X-RAY EXAM CHEST 2 VIEWS: CPT | Mod: GC | Performed by: RADIOLOGY

## 2023-10-27 ASSESSMENT — PAIN SCALES - GENERAL: PAINLEVEL: NO PAIN (0)

## 2023-10-27 NOTE — LETTER
October 31, 2023      TO: Akshat Fragoso  [address]  [facility] [address]         Dear Akshat,    You are scheduled for a Laser Lead Extraction & Re-implant, at [facility]. The hospital is located at [address] [place] [address] on the [place] bank of the campus.  If you need to cancel this procedure, please call [phone].     Pre-Admission Phone Call will occur 1-2 days prior to procedure date.  You do not need to come to the [facility].     Visitor Policy: Two visitors.       CT Chest without Contrast and labs  Arrive to the [facility]  Date:______  Time:______       Date:   Time: ________________Ar[facility] to [address] at [facility]  Laser Lead Extraction and Re-implant    1. Please review the attached instructions on showering before your procedure at the end of this letter.  2. Your history and physical will be completed by our advanced practice provider when you arrive.  3. Please do not eat anything for 8 hours prior to your procedure. You may have sips of water up until 2 hours prior to your arrival.  4. Medications to continue:  - Take all meds as prescribed, except for those noted below.  5. Medications to hold:    - 4 days prior: empagliflozin (Jardiance).   - 2 days prior: warfarin   - Morning of: Bumex    6. You will receive general anesthesia for this procedure.   7. You will stay overnight and need a .              Post-Procedure Instructions  Wound Care  If no Dermabond has been applied to your incision: Keep your incision (surgery wound) dry for 3 days.  After 3 days, you may remove the outer bandage.  Keep the strips of tape on.  They will be removed at your clinic visit.  If Dermabond has been applied to your incision,  do not apply powder or lotion to the incision for 14 days. You may shower in the morning.  Check for signs of infection each day.  These include increased redness, swelling, drainage or a fever over 101 F  (38.3 C).  Call us immediately if you see any of   these signs.  If there are no signs of infection, you may shower in 3 days.  Do not submerge the incision (in a bath tub, hot tub, or swimming pool) until fully healed.  Pain  You may have mild to moderate pain for 3 to 5 days.  Take acetaminophen (Tylenol) or ibuprofen (Advil) for the pain.  Call us if the pain is severe or lasts more than 5 days.  Activity  You should slowly go back to your normal activities after 24 hours.  Healing will take 4 to 6 weeks.  No driving for 3 days  Avoid climbing a ladder alone.  It is best to stay within 4 feet of the ground.  Avoid anything that may cause rough contact or a hard hit to your chest.  This includes football, hockey, and other contact sports.  FOR AT LEAST 4 WEEKS:  Do not raise your affected arm above your shoulder.  Do not use your affected arm to push, pull, or lift anything over 10 pounds.  Avoid repetitive upper body activities for 6 weeks (ie: golf, swimming, and weight lifting)           Follow Up Appointments Date & Time   7-10 day incision check with device clinic      3 month follow up visit with device check & provider                    If you have further questions, please utilize [email or web address] to contact us.   If your question concerns the above instructions, contact:  Saloni Martin RN   Electrophysiology Nurse Coordinator.  [phone]     If your question concerns the schedule/appointment times, contact:  LARISSA Casas Procedure   [phone]     Device Clinic (Pacemakers, Defibrillators, Loop Recorders)  [phone]              Showering Before Surgery   Your surgeon has asked you to take 2 showers before surgery.  Why is this important?  It is normal for bacteria (germs) to be on your skin. The skin protects us from these germs. When you have surgery, we cut the skin. Sometimes germs get into the cuts and cause infection (illness caused by germs). By following the instructions below and  using special soap, you will lower the number of germs on your skin. This decreases your chance of infection.  Special soap  Buy or get 8 ounces of antiseptic surgical soap called 4% CHG. Common name brands of this soap are Hibiclens and Exidine.   You can find it at your local pharmacy, clinic or retail store. If you have trouble, ask your pharmacist to help you find the right substitute.   A note about shaving:  Do not shave within 12 inches of your incision (surgical cut) area for at least 3 days before surgery. Shaving can make small cuts in the skin. This puts you at a higher risk of infection.  Items you will need for each shower:   1 newly washed towel   4 ounces of one of the above soaps  Follow these instructions:  The evening before surgery   1. Wash your hair and body with your regular shampoo and soap. Make sure you rinse the shampoo and soap from your hair and body.   2. Using clean hands, apply about 2 ounces of soap gently on your skin from the neck to your toes. Use on your groin area last. Do not use this soap on your face or head. If you get any soap in your eyes, ears or mouth, rinse right away.   3. Repeat step 2. It is very important to let the soap stay on your skin for at least 1 minute.   4. Rinse well and dry off using a clean towel.If you feel any tingling, itching or other irritation, rinse right away. It is normal to feel some coolness on the skin after using the antiseptic soap. Your skin may feel a bit dry after the shower, but do not use any lotions, creams or moisturizers. Do not use hair spray or other products in your hair.  5. Dress in freshly washed clothes or pajamas. Use fresh pillowcases and sheets on your bed.     The morning of surgery  1. Wash your hair and body with your regular shampoo and soap. Make sure you rinse the shampoo and soap from your hair and body.   2. Using clean hands, apply about 2 ounces of soap gently on your skin from the neck to your toes. Use on your  groin area last. Do not use this soap on your face or head. If you get any soap in your eyes, ears or mouth, rinse right away.   3. Repeat step 2. It is very important to let the soap stay on your skin for at least 1 minute.   4. Rinse well and dry off using a clean towel.If you feel any tingling, itching or other irritation, rinse right away. It is normal to feel some coolness on the skin after using the antiseptic soap. Your skin may feel a bit dry after the shower, but do not use any lotions, creams or moisturizers. Do not use hair spray or other products in your hair.  5. Dress in clean clothes.  If you have any questions about showering or an allergy to CHG soap, please call the Pread[facility] Nursing Department at the hospital where you are having your surgery.  [facility]: [phone]  [facility]: [phone]  [place]: [phone] or [phone]  [facility]: [phone].  [facility]: [phone]  [place]: [phone]  [facility] ([place]): [phone]  [facility] ([facility]): [phone]  This phone number will be answered between the hours of 8:00 a.m. and 6:30 p.m. Monday through Friday.

## 2023-10-27 NOTE — NURSING NOTE
Chief Complaint   Patient presents with    New Patient     New EP       Vitals were taken, medications reconciled.    Alisa Addison CMA  5:10 PM

## 2023-10-27 NOTE — TELEPHONE ENCOUNTER
D:  Called and spoke to pt's wife.  I:   Pt does not need to be seen by the LVAD team [place].  Gave instructions for pt to call VAD Coordinator on Call for any non-emergency issues and 911 for Emergencies.  Requested pt's wife ask pt to call back regarding setting up surveillance testing.  A:  Wife verbalized understanding.  P:  VAD Coordinator available for questions or concerns.

## 2023-10-27 NOTE — LETTER
10/27/2023      RE: Akshat Fragoso  [address]  [facility] [address]       Dear Colleague,    Thank you for the opportunity to participate in the care of your patient, Akshat Fragoso, at the [facility] [place] [facility]. Please see a copy of my visit note below.        ELECTROPHYSIOLOGY CLINIC VISIT    Assessment/Recommendations   Assessment/Plan:    Mr. Fragoso is a 58 year old medical history significant for NICM LVEF 12%, VT/VF arrest on 3/15/2023, s/p DT LVAD 3/23/23, moderate TV annuloplasty  with 32 mm MC3 partial ring and PFO closure 3/23/2023, s/p ICD 6/8/17, HTN, COPD, CKD III, and chronic tobacco/marijuana use.      NICM s/p DT LVAD 3/2023 LVEF 12%  RV lead dysfunction:  1. ACEi/ARB/ARNi: Continue Lisinopril.  2. BB: Not on d/t RV dysfunction.   3. Aldosterone antagonist: Continue Eplerenone.  4. STLG2i: Continue Jardiance.  5.  SCD prophylaxis: s/p ICD 2017. He has now had recent decrease in pacing impedance after LVAD implant with substantial decrease in sensing and gradual increase in pacing threshold. We discussed lead is showing signs of progressive microperforation. We discussed we would recommend extraction and reimplant. We had a long discussion with the patient about lead extraction.  We discussed the indications for lead extraction, the extraction procedure and the risks of extraction.  These risks include vascular tear, cardiac tear, clotting and occlusion of a vessel, infection, damage to other components of the implanted device, bleeding, death, and need for emergency cardiopulmonary bypass, sternotomy or thoracotomy.  The patient understands and wishes to proceed. We will discuss with Dr. Aguiar about necessity of bridging.   6. Fluid status: Continue Bumex  7. Etiology: NICM    Follow up 3 months post extraction.        History of Present Illness/Subjective    Mr. Akshat Fragoso is a 58 year old male who comes in today  for EP consultation of RV lead issues.    Mr. Fragoso is a 58 year old medical history significant for NICM LVEF 12%, VT/VF arrest on 3/15/2023, s/p DT LVAD 3/23/23, moderate TV annuloplasty  with 32 mm MC3 partial ring and PFO closure 3/23/2023, s/p ICD 6/8/17, HTN, COPD, CKD III, and chronic tobacco/marijuana use.    In January 2017, he was admitted with worsening JOHNSON and SOB and was found to have NICM. He had reported a 5 year history of JOHNSON that worsened over the last year. Coronary angiogram showed clean coronaries, and RHC with normal CI, and mildly elevated right and left heart filling pressures. He was started on optimal medical therapy. His JOHNSON/SOB improved with medications. Repeat CMRI shows an LVEF of 12% and RVEF of 30%. He had ICD implanted on 6/8/17. He suffered a VT/VF arrest on 3/15/2023 requiring CPR for 6 mins with cardiogenic shock. Ultimately had HM3 LVAD implantation as DT with concomitant TV annuloplasty and PFO closure on 3/23/2023 c/b postop AF (on amiodarone and digoxin) and HAP. He has now had recent decrease in pacing impedance after LVAD implant with substantial decrease in sensing and gradual increase in pacing threshold.      He reports feeling well. He denies chest discomfort, palpitations, abdominal fullness/bloating or peripheral edema, shortness of breath, paroxysmal nocturnal dyspnea, orthopnea, lightheadedness, dizziness, pre-syncope, or syncope. Device interrogation shows normal device function, RV lead impedence in clinic today is 203 ohms., no ventricular arrhythmias, and  <1%. Current cardiac medications include: Bumex, Eplerenone, Digoxin, Lisinopril, Jardiance, ASA, Amiodarone, and Warfarin.         I have reviewed and updated the patient's Past Medical History, Social History, Family History and Medication List.     Cardiographics (Personally Reviewed) :   5/3/23 Echo:   Interpretation Summary  Please refer to the EPIC report for measurements performed at different  "LVAD  speed settings.  HM3 at 5200RPM at baseline.  LVIDd 43mm.  Septum normal.  Aortic valve remain closed at baseline.    4/2017 CMR:  IMPRESSION:  1.  Severely dilated left ventricle with severely reduced global  function with a calculated ejection fraction of  12 %.  2.  Moderately dilated right ventricle with moderately reduced global  function with a calculated ejection fraction of 30%.   3.  There is mild mitral and tricuspid regurgitation.  4.  The left atrium is severely dilated. The right atrium is mildly  dilated.  5.  On delayed enhancement imaging, there is extensive  hyperenhancement in multiple non-ischemic patterns: transmural,  mid-myocardial, subendocardial, and epicardial. 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 The extent and  distribution of hyperenhancement are unchanged from the prior study.  6.  Compared with the prior study dated 1/11/2017, right ventricular  function has improved.          Physical Examination   BP (!) 84/0 (BP Location: Right arm, Patient Position: Sitting, Cuff Size: Adult Regular)   Ht 1.709 m (5' 7.28\")   Wt 88.8 kg (195 lb 11.2 oz)   SpO2 96%   BMI 30.39 kg/m    Wt Readings from Last 3 Encounters:   10/27/23 88.8 kg (195 lb 11.2 oz)   08/10/23 81.6 kg (180 lb)   08/07/23 81.7 kg (180 lb 1.9 oz)     General Appearance:   Alert, well-appearing and in no acute distress.   HEENT: Atraumatic, normocephalic. PERRL.  MMM.   Chest/Lungs:   Respirations unlabored.  Lungs are clear to auscultation.   Cardiovascular:   VAD hum. Regular.     Abdomen:  Soft, nontender, nondistended.   Extremities: No cyanosis or clubbing. No edema.    Musculoskeletal: Moves all extremities.  Gait normal.   Skin: Warm, dry, intact.    Neurologic: Mood and affect are appropriate.  Alert and oriented to person, place, time, and situation.          Medications  Allergies   Current Outpatient Medications   Medication Sig Dispense Refill    acetaminophen (TYLENOL) 325 MG " tablet Take 2 tablets (650 mg) by mouth every 4 hours as needed for other (For optimal non-opioid multimodal pain management to improve pain control.) 100 tablet 0    amiodarone (PACERONE) 200 MG tablet Take 1 tablet (200 mg) by mouth daily 30 tablet 11    amoxicillin (AMOXIL) 500 MG tablet Take 4 tablets (2,000 mg) by mouth as needed (Take one hour before dental procedure.) 4 tablet 0    aspirin (ASA) 81 MG chewable tablet 1 tablet (81 mg) by Oral or NG Tube route daily 90 tablet 3    bumetanide (BUMEX) 1 MG tablet TAKE 1 TABLET (1MG) BY MOUTH  AS NEEDED FOR SWELLING IN LOWER EXTREMITIES OR SHORTNESS OF BREATH. PLEASE LET YOUR COORDINATOR KNOW IF YOU TAKE A DOSE. 30 tablet 0    digoxin (LANOXIN) 250 MCG tablet Take 1 tablet (250 mcg) by mouth daily 30 tablet 11    empagliflozin (JARDIANCE) 10 MG TABS tablet Take 1 tablet (10 mg) by mouth daily 90 tablet 3    eplerenone (INSPRA) 25 MG tablet Take 12.5mg (1/2 tab) daily 45 tablet 3    lisinopril (ZESTRIL) 5 MG tablet Take 1 tablet (5 mg) by mouth daily 90 tablet 3    pantoprazole (PROTONIX) 20 MG EC tablet Take 1 tablet (20 mg) by mouth every morning (before breakfast) 90 tablet 3    warfarin ANTICOAGULANT (COUMADIN) 5 MG tablet Take 0.5 tablet (2.5 mg) to 1 tablet (5 mg) by mouth every day OR as directed by Anticoagulation Clinic. 90 tablet 1    Allergies   Allergen Reactions    Codeine      Other reaction(s): GI intolerance, Intolerance-Can't Take         Lab Results (Personally Reviewed)    Chemistry/lipid CBC Cardiac Enzymes/BNP/TSH/INR   Lab Results   Component Value Date    BUN 25.5 (H) 08/07/2023     (L) 08/07/2023    CO2 26 08/07/2023     Creatinine   Date Value Ref Range Status   08/07/2023 1.65 (H) 0.67 - 1.17 mg/dL Final   11/20/2019 0.90 0.66 - 1.25 mg/dL Final       Lab Results   Component Value Date    CHOL 111 03/20/2023    HDL 39 (L) 03/20/2023    LDL 55 03/20/2023      Lab Results   Component Value Date    WBC 14.7 (H) 08/07/2023    HGB 14.1  08/07/2023    HCT 47.6 08/07/2023    MCV 89 08/07/2023     08/07/2023    Lab Results   Component Value Date    TSH 4.04 06/07/2023    INR 2.4 10/15/2023        The patient states understanding and is agreeable with the plan.   Charles Montgomery MD [facility]  Cardiology - Electrophysiology    Total time spent on patient visit, reviewing notes, imaging, labs, orders, and completing necessary documentation: 60 minutes.      Please do not hesitate to contact me if you have any questions/concerns.     Sincerely,     Charles Montgomery MD

## 2023-10-27 NOTE — PROGRESS NOTES
1 Year trend of lead impedence drop.  LVAD implant 3/2023.  [employer] is evaluating lead trend along with lead engineers.  Yumiko Antonio RN on 10/27/2023 at 8:34 AM

## 2023-10-27 NOTE — PROGRESS NOTES
ELECTROPHYSIOLOGY CLINIC VISIT    Assessment/Recommendations   Assessment/Plan:    Mr. Fragoso is a 58 year old medical history significant for NICM LVEF 12%, VT/VF arrest on 3/15/2023, s/p DT LVAD 3/23/23, moderate TV annuloplasty  with 32 mm MC3 partial ring and PFO closure 3/23/2023, s/p ICD 6/8/17, HTN, COPD, CKD III, and chronic tobacco/marijuana use.      NICM s/p DT LVAD 3/2023 LVEF 12%  RV lead dysfunction:  1. ACEi/ARB/ARNi: Continue Lisinopril.  2. BB: Not on d/t RV dysfunction.   3. Aldosterone antagonist: Continue Eplerenone.  4. STLG2i: Continue Jardiance.  5.  SCD prophylaxis: s/p ICD 2017. He has now had recent decrease in pacing impedance after LVAD implant with substantial decrease in sensing and gradual increase in pacing threshold. We discussed lead is showing signs of progressive microperforation. We discussed we would recommend extraction and reimplant. We had a long discussion with the patient about lead extraction.  We discussed the indications for lead extraction, the extraction procedure and the risks of extraction.  These risks include vascular tear, cardiac tear, clotting and occlusion of a vessel, infection, damage to other components of the implanted device, bleeding, death, and need for emergency cardiopulmonary bypass, sternotomy or thoracotomy.  The patient understands and wishes to proceed. We will discuss with Dr. Aguiar about necessity of bridging.   6. Fluid status: Continue Bumex  7. Etiology: NICM    Follow up 3 months post extraction.        History of Present Illness/Subjective    Mr. Akshat Fragoso is a 58 year old male who comes in today for EP consultation of RV lead issues.    Mr. Fragoso is a 58 year old medical history significant for NICM LVEF 12%, VT/VF arrest on 3/15/2023, s/p DT LVAD 3/23/23, moderate TV annuloplasty  with 32 mm MC3 partial ring and PFO closure 3/23/2023, s/p ICD 6/8/17, HTN, COPD, CKD III, and chronic tobacco/marijuana use.    In January 2017, he  was admitted with worsening JOHNSON and SOB and was found to have NICM. He had reported a 5 year history of JOHNSON that worsened over the last year. Coronary angiogram showed clean coronaries, and RHC with normal CI, and mildly elevated right and left heart filling pressures. He was started on optimal medical therapy. His JOHNSON/SOB improved with medications. Repeat CMRI shows an LVEF of 12% and RVEF of 30%. He had ICD implanted on 6/8/17. He suffered a VT/VF arrest on 3/15/2023 requiring CPR for 6 mins with cardiogenic shock. Ultimately had HM3 LVAD implantation as DT with concomitant TV annuloplasty and PFO closure on 3/23/2023 c/b postop AF (on amiodarone and digoxin) and HAP. He has now had recent decrease in pacing impedance after LVAD implant with substantial decrease in sensing and gradual increase in pacing threshold.      He reports feeling well. He denies chest discomfort, palpitations, abdominal fullness/bloating or peripheral edema, shortness of breath, paroxysmal nocturnal dyspnea, orthopnea, lightheadedness, dizziness, pre-syncope, or syncope. Device interrogation shows normal device function, RV lead impedence in clinic today is 203 ohms., no ventricular arrhythmias, and  <1%. Current cardiac medications include: Bumex, Eplerenone, Digoxin, Lisinopril, Jardiance, ASA, Amiodarone, and Warfarin.         I have reviewed and updated the patient's Past Medical History, Social History, Family History and Medication List.     Cardiographics (Personally Reviewed) :   5/3/23 Echo:   Interpretation Summary  Please refer to the EPIC report for measurements performed at different LVAD  speed settings.  HM3 at 5200RPM at baseline.  LVIDd 43mm.  Septum normal.  Aortic valve remain closed at baseline.    4/2017 CMR:  IMPRESSION:  1.  Severely dilated left ventricle with severely reduced global  function with a calculated ejection fraction of  12 %.  2.  Moderately dilated right ventricle with moderately reduced  "global  function with a calculated ejection fraction of 30%.   3.  There is mild mitral and tricuspid regurgitation.  4.  The left atrium is severely dilated. The right atrium is mildly  dilated.  5.  On delayed enhancement imaging, there is extensive  hyperenhancement in multiple non-ischemic patterns: transmural,  mid-myocardial, subendocardial, and epicardial. 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 The extent and  distribution of hyperenhancement are unchanged from the prior study.  6.  Compared with the prior study dated 1/11/2017, right ventricular  function has improved.          Physical Examination   BP (!) 84/0 (BP Location: Right arm, Patient Position: Sitting, Cuff Size: Adult Regular)   Ht 1.709 m (5' 7.28\")   Wt 88.8 kg (195 lb 11.2 oz)   SpO2 96%   BMI 30.39 kg/m    Wt Readings from Last 3 Encounters:   10/27/23 88.8 kg (195 lb 11.2 oz)   08/10/23 81.6 kg (180 lb)   08/07/23 81.7 kg (180 lb 1.9 oz)     General Appearance:   Alert, well-appearing and in no acute distress.   HEENT: Atraumatic, normocephalic. PERRL.  MMM.   Chest/Lungs:   Respirations unlabored.  Lungs are clear to auscultation.   Cardiovascular:   VAD hum. Regular.     Abdomen:  Soft, nontender, nondistended.   Extremities: No cyanosis or clubbing. No edema.    Musculoskeletal: Moves all extremities.  Gait normal.   Skin: Warm, dry, intact.    Neurologic: Mood and affect are appropriate.  Alert and oriented to person, place, time, and situation.          Medications  Allergies   Current Outpatient Medications   Medication Sig Dispense Refill    acetaminophen (TYLENOL) 325 MG tablet Take 2 tablets (650 mg) by mouth every 4 hours as needed for other (For optimal non-opioid multimodal pain management to improve pain control.) 100 tablet 0    amiodarone (PACERONE) 200 MG tablet Take 1 tablet (200 mg) by mouth daily 30 tablet 11    amoxicillin (AMOXIL) 500 MG tablet Take 4 tablets (2,000 mg) by mouth as needed " (Take one hour before dental procedure.) 4 tablet 0    aspirin (ASA) 81 MG chewable tablet 1 tablet (81 mg) by Oral or NG Tube route daily 90 tablet 3    bumetanide (BUMEX) 1 MG tablet TAKE 1 TABLET (1MG) BY MOUTH  AS NEEDED FOR SWELLING IN LOWER EXTREMITIES OR SHORTNESS OF BREATH. PLEASE LET YOUR COORDINATOR KNOW IF YOU TAKE A DOSE. 30 tablet 0    digoxin (LANOXIN) 250 MCG tablet Take 1 tablet (250 mcg) by mouth daily 30 tablet 11    empagliflozin (JARDIANCE) 10 MG TABS tablet Take 1 tablet (10 mg) by mouth daily 90 tablet 3    eplerenone (INSPRA) 25 MG tablet Take 12.5mg (1/2 tab) daily 45 tablet 3    lisinopril (ZESTRIL) 5 MG tablet Take 1 tablet (5 mg) by mouth daily 90 tablet 3    pantoprazole (PROTONIX) 20 MG EC tablet Take 1 tablet (20 mg) by mouth every morning (before breakfast) 90 tablet 3    warfarin ANTICOAGULANT (COUMADIN) 5 MG tablet Take 0.5 tablet (2.5 mg) to 1 tablet (5 mg) by mouth every day OR as directed by Anticoagulation Clinic. 90 tablet 1    Allergies   Allergen Reactions    Codeine      Other reaction(s): GI intolerance, Intolerance-Can't Take         Lab Results (Personally Reviewed)    Chemistry/lipid CBC Cardiac Enzymes/BNP/TSH/INR   Lab Results   Component Value Date    BUN 25.5 (H) 08/07/2023     (L) 08/07/2023    CO2 26 08/07/2023     Creatinine   Date Value Ref Range Status   08/07/2023 1.65 (H) 0.67 - 1.17 mg/dL Final   11/20/2019 0.90 0.66 - 1.25 mg/dL Final       Lab Results   Component Value Date    CHOL 111 03/20/2023    HDL 39 (L) 03/20/2023    LDL 55 03/20/2023      Lab Results   Component Value Date    WBC 14.7 (H) 08/07/2023    HGB 14.1 08/07/2023    HCT 47.6 08/07/2023    MCV 89 08/07/2023     08/07/2023    Lab Results   Component Value Date    TSH 4.04 06/07/2023    INR 2.4 10/15/2023        The patient states understanding and is agreeable with the plan.   Charles Montgomery MD [facility]  Cardiology - Electrophysiology    Total time spent on patient visit,  reviewing notes, imaging, labs, orders, and completing necessary documentation: 60 minutes.

## 2023-10-28 LAB
MDC_IDC_LEAD_CONNECTION_STATUS: NORMAL
MDC_IDC_LEAD_IMPLANT_DT: NORMAL
MDC_IDC_LEAD_LOCATION: NORMAL
MDC_IDC_LEAD_LOCATION_DETAIL_1: NORMAL
MDC_IDC_LEAD_MFG: NORMAL
MDC_IDC_LEAD_MODEL: NORMAL
MDC_IDC_LEAD_POLARITY_TYPE: NORMAL
MDC_IDC_LEAD_SERIAL: NORMAL
MDC_IDC_MSMT_BATTERY_DTM: NORMAL
MDC_IDC_MSMT_BATTERY_REMAINING_LONGEVITY: 126 MO
MDC_IDC_MSMT_BATTERY_STATUS: NORMAL
MDC_IDC_MSMT_CAP_CHARGE_DTM: NORMAL
MDC_IDC_MSMT_CAP_CHARGE_ENERGY: 0 J
MDC_IDC_MSMT_CAP_CHARGE_TIME: 0 S
MDC_IDC_MSMT_CAP_CHARGE_TIME: 10.39
MDC_IDC_MSMT_CAP_CHARGE_TYPE: NORMAL
MDC_IDC_MSMT_CAP_CHARGE_TYPE: NORMAL
MDC_IDC_MSMT_LEADCHNL_RV_IMPEDANCE_VALUE: 203 OHM
MDC_IDC_MSMT_LEADCHNL_RV_PACING_THRESHOLD_AMPLITUDE: 1.9 V
MDC_IDC_MSMT_LEADCHNL_RV_PACING_THRESHOLD_PULSEWIDTH: 0.4 MS
MDC_IDC_MSMT_LEADCHNL_RV_SENSING_INTR_AMPL: 1.6 MV
MDC_IDC_PG_IMPLANT_DTM: NORMAL
MDC_IDC_PG_MFG: NORMAL
MDC_IDC_PG_MODEL: NORMAL
MDC_IDC_PG_SERIAL: NORMAL
MDC_IDC_PG_TYPE: NORMAL
MDC_IDC_SESS_CLINIC_NAME: NORMAL
MDC_IDC_SESS_DTM: NORMAL
MDC_IDC_SESS_TYPE: NORMAL
MDC_IDC_SET_BRADY_HYSTRATE: NORMAL
MDC_IDC_SET_BRADY_LOWRATE: 40 {BEATS}/MIN
MDC_IDC_SET_BRADY_MODE: NORMAL
MDC_IDC_SET_LEADCHNL_RV_PACING_AMPLITUDE: 2.8 V
MDC_IDC_SET_LEADCHNL_RV_PACING_CAPTURE_MODE: NORMAL
MDC_IDC_SET_LEADCHNL_RV_PACING_POLARITY: NORMAL
MDC_IDC_SET_LEADCHNL_RV_PACING_PULSEWIDTH: 0.4 MS
MDC_IDC_SET_LEADCHNL_RV_SENSING_ADAPTATION_MODE: NORMAL
MDC_IDC_SET_LEADCHNL_RV_SENSING_POLARITY: NORMAL
MDC_IDC_SET_LEADCHNL_RV_SENSING_SENSITIVITY: 0.6 MV
MDC_IDC_SET_ZONE_DETECTION_INTERVAL: 300 MS
MDC_IDC_SET_ZONE_DETECTION_INTERVAL: 353 MS
MDC_IDC_SET_ZONE_STATUS: NORMAL
MDC_IDC_SET_ZONE_TYPE: NORMAL
MDC_IDC_SET_ZONE_VENDOR_TYPE: NORMAL
MDC_IDC_STAT_EPISODE_RECENT_COUNT: 0
MDC_IDC_STAT_EPISODE_RECENT_COUNT_DTM_END: NORMAL
MDC_IDC_STAT_EPISODE_RECENT_COUNT_DTM_START: NORMAL
MDC_IDC_STAT_EPISODE_TOTAL_COUNT: 12
MDC_IDC_STAT_EPISODE_TOTAL_COUNT: 13
MDC_IDC_STAT_EPISODE_TOTAL_COUNT: 9
MDC_IDC_STAT_EPISODE_TOTAL_COUNT_DTM_END: NORMAL
MDC_IDC_STAT_EPISODE_TYPE: NORMAL
MDC_IDC_STAT_EPISODE_VENDOR_TYPE: NORMAL
MDC_IDC_STAT_TACHYTHERAPY_ATP_DELIVERED_RECENT: 0
MDC_IDC_STAT_TACHYTHERAPY_ATP_DELIVERED_TOTAL: 1
MDC_IDC_STAT_TACHYTHERAPY_RECENT_DTM_END: NORMAL
MDC_IDC_STAT_TACHYTHERAPY_RECENT_DTM_START: NORMAL
MDC_IDC_STAT_TACHYTHERAPY_SHOCKS_ABORTED_RECENT: 0
MDC_IDC_STAT_TACHYTHERAPY_SHOCKS_ABORTED_TOTAL: 0
MDC_IDC_STAT_TACHYTHERAPY_SHOCKS_DELIVERED_RECENT: 0
MDC_IDC_STAT_TACHYTHERAPY_SHOCKS_DELIVERED_TOTAL: 0
MDC_IDC_STAT_TACHYTHERAPY_TOTAL_DTM_END: NORMAL

## 2023-10-31 ENCOUNTER — MYC MEDICAL ADVICE (OUTPATIENT)
Dept: ANTICOAGULATION | Facility: CLINIC | Age: 58
End: 2023-10-31
Payer: COMMERCIAL

## 2023-10-31 RX ORDER — LIDOCAINE 40 MG/G
CREAM TOPICAL
Status: CANCELLED | OUTPATIENT
Start: 2023-10-31

## 2023-10-31 RX ORDER — SODIUM CHLORIDE 9 MG/ML
INJECTION, SOLUTION INTRAVENOUS CONTINUOUS
Status: CANCELLED | OUTPATIENT
Start: 2023-10-31

## 2023-10-31 RX ORDER — CEFAZOLIN SODIUM 2 G/50ML
2 SOLUTION INTRAVENOUS
Status: CANCELLED | OUTPATIENT
Start: 2023-10-31

## 2023-10-31 NOTE — PATIENT INSTRUCTIONS
Plan:    Lead extraction and reimplant. You will be called with instructions and to schedule.      Your Care Team:  EP Cardiology   Telephone Number     Saloni Martin RN [phone]    After business hours: [phone], ask for cardiologist on-call   Non-procedure scheduling:    Lata SILVER   [phone]   Procedure scheduling:    Emma Waggoner   [phone]   Device Clinic (Pacemakers, ICDs, Loop Recorders)    During business hours: [phone]  After business hours:   [phone]- select option 4 and ask for job code [id number].       Cardiovascular Clinic:   [address] [place] [address]. [place] [address]      As always, thank you for trusting us with your health care needs!    
No

## 2023-11-01 ENCOUNTER — ANTICOAGULATION THERAPY VISIT (OUTPATIENT)
Dept: ANTICOAGULATION | Facility: CLINIC | Age: 58
End: 2023-11-01
Payer: COMMERCIAL

## 2023-11-01 DIAGNOSIS — Z79.01 ANTICOAGULATED ON COUMADIN: ICD-10-CM

## 2023-11-01 DIAGNOSIS — Z95.811 LVAD (LEFT VENTRICULAR ASSIST DEVICE) PRESENT (H): ICD-10-CM

## 2023-11-01 DIAGNOSIS — Z95.811 LEFT VENTRICULAR ASSIST DEVICE PRESENT (H): ICD-10-CM

## 2023-11-01 DIAGNOSIS — Z79.899 LONG TERM USE OF DRUG: ICD-10-CM

## 2023-11-01 DIAGNOSIS — I50.22 CHRONIC SYSTOLIC CONGESTIVE HEART FAILURE (H): ICD-10-CM

## 2023-11-01 DIAGNOSIS — I50.23 ACUTE ON CHRONIC SYSTOLIC CONGESTIVE HEART FAILURE (H): Primary | ICD-10-CM

## 2023-11-01 LAB — INR HOME MONITORING: 4 (ref 2–3)

## 2023-11-01 NOTE — PROGRESS NOTES
ANTICOAGULATION MANAGEMENT     Akshat Fragoso 58 year old male is on warfarin with supratherapeutic INR result. (Goal INR 2.0-3.0)    Recent labs: (last 7 days)     11/01/23  0000   INR 4.0*       ASSESSMENT     Source(s): Chart Review and Patient/Caregiver Call     Warfarin doses taken: Warfarin taken as instructed  Diet: No new diet changes identified  Medication/supplement changes:  Patient has been using more Tylenol after arm pain from getting a covid vaccine  New illness, injury, or hospitalization: No  Signs or symptoms of bleeding or clotting: No  Previous result: Therapeutic last 2(+) visits  Additional findings:  Patient recently had a Covid vaccine and had arm pain a few days after getting the injection       PLAN     Recommended plan for temporary change(s) affecting INR     Dosing Instructions: hold dose then continue your current warfarin dose with next INR in 1 week       Summary  As of 11/1/2023      Full warfarin instructions:  11/1: Hold; Otherwise 2.5 mg every Tue, Fri; 5 mg all other days   Next INR check:  11/8/2023               Telephone call with Cheryl who verbalizes understanding and agrees to plan and who agrees to plan and repeated back plan correctly    Patient to recheck with home meter    Education provided:   Taking warfarin: Importance of taking warfarin as instructed  Goal range and lab monitoring: goal range and significance of current result, Importance of therapeutic range, and Importance of following up at instructed interval    Plan made per [facility] anticoagulation protocol    Merry Reyes RN  Anticoagulation Clinic  11/1/2023    _______________________________________________________________________     Anticoagulation Episode Summary       Current INR goal:  2.0-3.0   TTR:  76.7% (6.6 mo)   Target end date:  Indefinite   Send INR reminders to:  ANTICOAG LVAD    Indications    Acute on chronic systolic congestive heart failure (H) [I50.23]  LVAD (left ventricular assist  device) present (H) [Z95.811]  Chronic systolic congestive heart failure (H) [I50.22]  Long term use of drug [Z79.899]  Left ventricular assist device present (H) [Z95.811]  Anticoagulated on Coumadin [Z79.01]             Comments:  Follow VAD Anticoag protocol:Yes: HeartMate 3   Bridging: Enoxaparin   Date VAD placed: 3/23/23             Anticoagulation Care Providers       Provider Role Specialty Phone number    Kenzie Moreau MD Referring Advanced Heart Failure and Transplant Cardiology [phone]    Mile Bolivar, VERONICA CNP Referring Nurse Practitioner [phone]    Shaun Aguiar MD Referring Cardiovascular Disease [phone]

## 2023-11-06 ENCOUNTER — CARE COORDINATION (OUTPATIENT)
Dept: CARDIOLOGY | Facility: CLINIC | Age: 58
End: 2023-11-06
Payer: COMMERCIAL

## 2023-11-06 NOTE — PROGRESS NOTES
D: On-call coordinator received page from patient's wife with medication questions.     I/A: Patient is up [facility] [place], wife packed his pill box. Patient has been taking pills out of day order and he could not remember if he took pills this AM or not, he ended up just taking AM pills now although unclear if he also took them earlier.   She is wondering what to watch for/ adverse events with taking double of medications.   Advised wife to watch for lethargy/confusion, dizziness/ lightheadedness, vad alarms- seek treatment if these occur.     P: Advised patient's wife to also call pharmacy as they would be able to give more specifics on things to watch for in relation to his specific medications. She will call the pharmacy now. Will discuss with primary coordinator.  Caregiver notified to page on-call coordinator if symptoms worsen or with other concerns. Caregiver verbalized understanding.

## 2023-11-07 ENCOUNTER — TELEPHONE (OUTPATIENT)
Dept: CARDIOLOGY | Facility: CLINIC | Age: 58
End: 2023-11-07
Payer: COMMERCIAL

## 2023-11-07 NOTE — TELEPHONE ENCOUNTER
EP Scheduling called the patient to schedule laser lead extract with Dr. Montgomery. The number [phone] was left for the patient to return the call and schedule the procedure.    Emma Waggoner  Periop Electrophysiology   [phone]

## 2023-11-09 DIAGNOSIS — I50.22 CHRONIC SYSTOLIC CONGESTIVE HEART FAILURE (H): ICD-10-CM

## 2023-11-09 DIAGNOSIS — Z95.811 LEFT VENTRICULAR ASSIST DEVICE PRESENT (H): ICD-10-CM

## 2023-11-09 DIAGNOSIS — Z79.899 LONG TERM USE OF DRUG: Primary | ICD-10-CM

## 2023-11-09 RX ORDER — CEFAZOLIN SODIUM 2 G/50ML
2 SOLUTION INTRAVENOUS
Status: CANCELLED | OUTPATIENT
Start: 2023-11-09

## 2023-11-09 RX ORDER — LIDOCAINE 40 MG/G
CREAM TOPICAL
Status: CANCELLED | OUTPATIENT
Start: 2023-11-09

## 2023-11-09 RX ORDER — SODIUM CHLORIDE 9 MG/ML
INJECTION, SOLUTION INTRAVENOUS CONTINUOUS
Status: CANCELLED | OUTPATIENT
Start: 2023-11-09

## 2023-11-10 ENCOUNTER — TELEPHONE (OUTPATIENT)
Dept: ANTICOAGULATION | Facility: CLINIC | Age: 58
End: 2023-11-10
Payer: COMMERCIAL

## 2023-11-10 ENCOUNTER — ANTICOAGULATION THERAPY VISIT (OUTPATIENT)
Dept: ANTICOAGULATION | Facility: CLINIC | Age: 58
End: 2023-11-10
Payer: COMMERCIAL

## 2023-11-10 DIAGNOSIS — Z79.01 ANTICOAGULATED ON COUMADIN: ICD-10-CM

## 2023-11-10 DIAGNOSIS — Z79.899 LONG TERM USE OF DRUG: ICD-10-CM

## 2023-11-10 DIAGNOSIS — Z95.811 LVAD (LEFT VENTRICULAR ASSIST DEVICE) PRESENT (H): ICD-10-CM

## 2023-11-10 DIAGNOSIS — I50.23 ACUTE ON CHRONIC SYSTOLIC CONGESTIVE HEART FAILURE (H): Primary | ICD-10-CM

## 2023-11-10 DIAGNOSIS — I50.22 CHRONIC SYSTOLIC CONGESTIVE HEART FAILURE (H): ICD-10-CM

## 2023-11-10 DIAGNOSIS — Z95.811 LEFT VENTRICULAR ASSIST DEVICE PRESENT (H): ICD-10-CM

## 2023-11-10 LAB — INR HOME MONITORING: 3.6 (ref 2–3)

## 2023-11-10 NOTE — PROGRESS NOTES
ANTICOAGULATION MANAGEMENT     Akshat Fragoso 58 year old male is on warfarin with supratherapeutic INR result. (Goal INR 2.0-3.0)    Recent labs: (last 7 days)     11/10/23  0000   INR 3.6*       ASSESSMENT     Source(s): Chart Review and Patient/Caregiver Call     Warfarin doses taken: Warfarin taken as instructed  Diet: No new diet changes identified  Medication/supplement changes: None noted  New illness, injury, or hospitalization: No  Signs or symptoms of bleeding or clotting: No  Previous result: Supratherapeutic  Additional findings: None       PLAN     Recommended plan for no diet, medication or health factor changes affecting INR     Dosing Instructions: hold dose then decrease your warfarin dose (8% change) with next INR in 1 week       Summary  As of 11/10/2023      Full warfarin instructions:  11/10: Hold; Otherwise 2.5 mg every Sun, Tue, Fri; 5 mg all other days   Next INR check:  11/17/2023               Telephone call with Cheryl who verbalizes understanding and agrees to plan and who agrees to plan and repeated back plan correctly    Patient to recheck with home meter    Education provided:   Taking warfarin: Importance of taking warfarin as instructed  Goal range and lab monitoring: goal range and significance of current result and Importance of therapeutic range    Plan made per [facility] anticoagulation protocol    Merry Reyes RN  Anticoagulation Clinic  11/10/2023    _______________________________________________________________________     Anticoagulation Episode Summary       Current INR goal:  2.0-3.0   TTR:  73.4% (6.9 mo)   Target end date:  Indefinite   Send INR reminders to:  ANTICOAG LVAD    Indications    Acute on chronic systolic congestive heart failure (H) [I50.23]  LVAD (left ventricular assist device) present (H) [Z95.811]  Chronic systolic congestive heart failure (H) [I50.22]  Long term use of drug [Z79.899]  Left ventricular assist device present (H) [Z95.811]  Anticoagulated  on Coumadin [Z79.01]             Comments:  Follow VAD Anticoag protocol:Yes: HeartMate 3   Bridging: Enoxaparin   Date VAD placed: 3/23/23             Anticoagulation Care Providers       Provider Role Specialty Phone number    Kenzie Moreau MD Referring Advanced Heart Failure and Transplant Cardiology [phone]    Mile Bolivar, VERONICA CNP Referring Nurse Practitioner [phone]    Shaun Aguiar MD Referring Cardiovascular Disease [phone]

## 2023-11-10 NOTE — TELEPHONE ENCOUNTER
ANTICOAGULATION     Akshat Fragoso is overdue for an INR check.     Left message reminding patient to check INR with their home meter and call results to the home monitoring company as soon as possible.     Mitzi Stack RN

## 2023-11-13 ENCOUNTER — MYC REFILL (OUTPATIENT)
Dept: CARDIOLOGY | Facility: CLINIC | Age: 58
End: 2023-11-13
Payer: COMMERCIAL

## 2023-11-13 ENCOUNTER — MYC REFILL (OUTPATIENT)
Dept: ANTICOAGULATION | Facility: CLINIC | Age: 58
End: 2023-11-13
Payer: COMMERCIAL

## 2023-11-13 ENCOUNTER — ANTICOAGULATION THERAPY VISIT (OUTPATIENT)
Dept: ANTICOAGULATION | Facility: CLINIC | Age: 58
End: 2023-11-13
Payer: COMMERCIAL

## 2023-11-13 ENCOUNTER — TELEPHONE (OUTPATIENT)
Dept: CARDIOLOGY | Facility: CLINIC | Age: 58
End: 2023-11-13
Payer: COMMERCIAL

## 2023-11-13 DIAGNOSIS — Z95.811 LVAD (LEFT VENTRICULAR ASSIST DEVICE) PRESENT (H): ICD-10-CM

## 2023-11-13 DIAGNOSIS — Z95.811 LEFT VENTRICULAR ASSIST DEVICE PRESENT (H): ICD-10-CM

## 2023-11-13 DIAGNOSIS — I50.22 CHRONIC SYSTOLIC CONGESTIVE HEART FAILURE (H): ICD-10-CM

## 2023-11-13 DIAGNOSIS — Z79.01 ANTICOAGULATED ON COUMADIN: ICD-10-CM

## 2023-11-13 DIAGNOSIS — I50.23 ACUTE ON CHRONIC SYSTOLIC CONGESTIVE HEART FAILURE (H): Primary | ICD-10-CM

## 2023-11-13 DIAGNOSIS — Z79.899 LONG TERM USE OF DRUG: ICD-10-CM

## 2023-11-13 LAB — INR HOME MONITORING: 2.6 (ref 2–3)

## 2023-11-13 PROCEDURE — 99207 PR NO BILLABLE SERVICE THIS VISIT: CPT | Performed by: INTERNAL MEDICINE

## 2023-11-13 RX ORDER — EPLERENONE 25 MG/1
TABLET, FILM COATED ORAL
Qty: 45 TABLET | Refills: 3 | Status: CANCELLED | OUTPATIENT
Start: 2023-11-13

## 2023-11-13 NOTE — PROGRESS NOTES
ANTICOAGULATION MANAGEMENT     Akshat Fragoso 58 year old male is on warfarin with therapeutic INR result. (Goal INR 2.0-3.0)    Recent labs: (last 7 days)     11/13/23  0000   INR 2.6       ASSESSMENT     Source(s): Chart Review  Previous INR was Supratherapeutic  Medication, diet, health changes since last INR chart reviewed; none identified         PLAN     Recommended plan for no diet, medication or health factor changes affecting INR     Dosing Instructions: Continue your current warfarin dose with next INR in 1 week       Summary  As of 11/13/2023      Full warfarin instructions:  2.5 mg every Sun, Tue, Fri; 5 mg all other days   Next INR check:  11/20/2023               Detailed voice message left for Akshat with dosing instructions and follow up date.   Sent [email or web address] message with dosing and follow up instructions    Patient to recheck with home meter    Education provided:   Please call back if any changes to your diet, medications or how you've been taking warfarin  Goal range and lab monitoring: goal range and significance of current result, Importance of therapeutic range, and Importance of following up at instructed interval    Plan made per [facility] anticoagulation protocol and per LVAD protocol    Mitzi Stack RN  Anticoagulation Clinic  11/13/2023    _______________________________________________________________________     Anticoagulation Episode Summary       Current INR goal:  2.0-3.0   TTR:  72.9% (7 mo)   Target end date:  Indefinite   Send INR reminders to:  ANTICOAG LVAD    Indications    Acute on chronic systolic congestive heart failure (H) [I50.23]  LVAD (left ventricular assist device) present (H) [Z95.811]  Chronic systolic congestive heart failure (H) [I50.22]  Long term use of drug [Z79.899]  Left ventricular assist device present (H) [Z95.811]  Anticoagulated on Coumadin [Z79.01]             Comments:  Follow VAD Anticoag protocol:Yes: HeartMate 3   Bridging: Enoxaparin   Date VAD placed:  3/23/23             Anticoagulation Care Providers       Provider Role Specialty Phone number    Kenzie Moreau MD Referring Advanced Heart Failure and Transplant Cardiology [phone]    Mile Bolivar, APRN CNP Referring Nurse Practitioner [phone]    Shaun Aguiar MD Referring Cardiovascular Disease [phone]

## 2023-11-13 NOTE — TELEPHONE ENCOUNTER
Refills on file for eplerenone (INSPRA) 25 MG tablet:  Disp-45 tablet, R-3,    Ordered on: 07/25/2023 and sent to [employer], [place] - [address] [place] [address] [phone] Refill

## 2023-11-14 RX ORDER — WARFARIN SODIUM 5 MG/1
TABLET ORAL
Qty: 90 TABLET | Refills: 1 | Status: ON HOLD | OUTPATIENT
Start: 2023-11-14 | End: 2024-01-05

## 2023-11-14 NOTE — TELEPHONE ENCOUNTER
ANTICOAGULATION MANAGEMENT:  Medication Refill    Anticoagulation Summary  As of 11/13/2023      Warfarin maintenance plan:  2.5 mg (5 mg x 0.5) every Sun, Tue, Fri; 5 mg (5 mg x 1) all other days   Next INR check:  11/20/2023   Target end date:  Indefinite    Indications    Acute on chronic systolic congestive heart failure (H) [I50.23]  LVAD (left ventricular assist device) present (H) [Z95.811]  Chronic systolic congestive heart failure (H) [I50.22]  Long term use of drug [Z79.899]  Left ventricular assist device present (H) [Z95.811]  Anticoagulated on Coumadin [Z79.01]                 Anticoagulation Care Providers       Provider Role Specialty Phone number    Kenzie Moreau MD Referring Advanced Heart Failure and Transplant Cardiology [phone]    Mile Bolivar APRN CNP Referring Nurse Practitioner [phone]    Shaun Aguiar MD Referring Cardiovascular Disease [phone]            Refill Criteria    Visit with referring provider/group: Meets criteria: office visit within referring provider group in the last 1 year on 10/27/23    [facility] referral signed last signed: 04/25/2023; within last year: Yes    Lab monitoring not exceeding 2 weeks overdue: No    Akshat meets all criteria for refill. Rx instructions and quantity supplied updated to match patient's current dosing plan. Warfarin 90 day supply with 1 refill granted per [facility] protocol     TREV LYN RN  Anticoagulation Clinic

## 2023-11-24 ENCOUNTER — MYC REFILL (OUTPATIENT)
Dept: CARDIOLOGY | Facility: CLINIC | Age: 58
End: 2023-11-24
Payer: COMMERCIAL

## 2023-11-24 DIAGNOSIS — Z95.811 LVAD (LEFT VENTRICULAR ASSIST DEVICE) PRESENT (H): ICD-10-CM

## 2023-11-27 RX ORDER — DIGOXIN 250 MCG
250 TABLET ORAL DAILY
Qty: 30 TABLET | Refills: 11 | OUTPATIENT
Start: 2023-11-27

## 2023-11-27 RX ORDER — AMIODARONE HYDROCHLORIDE 200 MG/1
200 TABLET ORAL DAILY
Qty: 30 TABLET | Refills: 11 | OUTPATIENT
Start: 2023-11-27

## 2023-11-28 ENCOUNTER — TELEPHONE (OUTPATIENT)
Dept: CARDIOLOGY | Facility: CLINIC | Age: 58
End: 2023-11-28
Payer: COMMERCIAL

## 2023-11-29 ENCOUNTER — MYC MEDICAL ADVICE (OUTPATIENT)
Dept: ANTICOAGULATION | Facility: CLINIC | Age: 58
End: 2023-11-29
Payer: COMMERCIAL

## 2023-11-30 ENCOUNTER — ANTICOAGULATION THERAPY VISIT (OUTPATIENT)
Dept: ANTICOAGULATION | Facility: CLINIC | Age: 58
End: 2023-11-30
Payer: COMMERCIAL

## 2023-11-30 DIAGNOSIS — Z95.811 LVAD (LEFT VENTRICULAR ASSIST DEVICE) PRESENT (H): ICD-10-CM

## 2023-11-30 DIAGNOSIS — Z95.811 LEFT VENTRICULAR ASSIST DEVICE PRESENT (H): ICD-10-CM

## 2023-11-30 DIAGNOSIS — Z79.899 LONG TERM USE OF DRUG: ICD-10-CM

## 2023-11-30 DIAGNOSIS — Z79.01 ANTICOAGULATED ON COUMADIN: ICD-10-CM

## 2023-11-30 DIAGNOSIS — I50.23 ACUTE ON CHRONIC SYSTOLIC CONGESTIVE HEART FAILURE (H): Primary | ICD-10-CM

## 2023-11-30 DIAGNOSIS — I50.22 CHRONIC SYSTOLIC CONGESTIVE HEART FAILURE (H): ICD-10-CM

## 2023-11-30 LAB — INR HOME MONITORING: 3.4 (ref 2–3)

## 2023-11-30 NOTE — PROGRESS NOTES
ANTICOAGULATION MANAGEMENT     Akshat Fragoso 58 year old male is on warfarin with supratherapeutic INR result. (Goal INR 2.0-3.0)    Recent labs: (last 7 days)     11/30/23  0000   INR 3.4*       ASSESSMENT     Source(s): Chart Review and Patient/Caregiver Call     Warfarin doses taken: Warfarin taken as instructed  Diet: No new diet changes identified-has been eating a lot of apples every day. Low content of Vitamin K in one medium apple  Medication/supplement changes: None noted  New illness, injury, or hospitalization: No  Signs or symptoms of bleeding or clotting: No changes to chronic bruising  Previous result: Therapeutic last visit; previously outside of goal range  Additional findings:  spouse currently has COVID and is starting Paxlovid for treatment (in hope to prevent spread to Akshat/others)        PLAN     Recommended plan for no diet, medication or health factor changes affecting INR     Dosing Instructions: decrease your warfarin dose (9.1% change) with next INR in 1-2 weeks       Summary  As of 11/30/2023      Full warfarin instructions:  5 mg every Mon, Wed, Fri; 2.5 mg all other days   Next INR check:  12/14/2023               Telephone call with spouse, Landry who agrees to plan and repeated back plan correctly    Patient to recheck with home meter    Education provided:   Goal range and lab monitoring: goal range and significance of current result and Importance of following up at instructed interval  Symptom monitoring: monitoring for bleeding signs and symptoms  Importance of notifying anticoagulation clinic for: diarrhea, nausea/vomiting, reduced intake, cold/flu, and/or infections; a sooner lab recheck maybe needed  Contact [phone] with any changes, questions or concerns.     Plan made per [facility] anticoagulation protocol and per LVAD protocol    TREV LYN RN  Anticoagulation Clinic  11/30/2023    _______________________________________________________________________     Anticoagulation  Episode Summary       Current INR goal:  2.0-3.0   TTR:  71.2% (7.6 mo)   Target end date:  Indefinite   Send INR reminders to:  ANTICOAG LVAD    Indications    Acute on chronic systolic congestive heart failure (H) [I50.23]  LVAD (left ventricular assist device) present (H) [Z95.811]  Chronic systolic congestive heart failure (H) [I50.22]  Long term use of drug [Z79.899]  Left ventricular assist device present (H) [Z95.811]  Anticoagulated on Coumadin [Z79.01]             Comments:  Follow VAD Anticoag protocol:Yes: HeartMate 3   Bridging: Enoxaparin   Date VAD placed: 3/23/23             Anticoagulation Care Providers       Provider Role Specialty Phone number    Kenzie Moreau MD Referring Advanced Heart Failure and Transplant Cardiology [phone]    Mile Bolivar, VERONICA CNP Referring Nurse Practitioner [phone]    Shaun Aguiar MD Referring Cardiovascular Disease [phone]

## 2023-12-02 ENCOUNTER — NURSE TRIAGE (OUTPATIENT)
Dept: NURSING | Facility: CLINIC | Age: 58
End: 2023-12-02
Payer: COMMERCIAL

## 2023-12-02 ENCOUNTER — CARE COORDINATION (OUTPATIENT)
Dept: CARDIOLOGY | Facility: CLINIC | Age: 58
End: 2023-12-02
Payer: COMMERCIAL

## 2023-12-02 NOTE — PROGRESS NOTES
"D:  caregiver called t report pt testing positive for COVID today.  Caregiver has had it for 1 week.  Wife wondering if he can have paxlovid.  VAD numbers and weights are stable.  I:  Advised wife to call primary care team to advise on Paxlovid candidacy and other options available to him.  Encouraged rest, to stay hydrated, tylenol & cough suppressants (as long as they do not have the \"d\" in them) prn, and to monitor for changes to VAD parameters.  Encouraged them to re-page if symptoms worsen.  A:  Covid questions  P: Pt/caregiver verbalized understanding of the instructions given.  Will call VAD coordinator with further needs and questions.    "

## 2023-12-02 NOTE — TELEPHONE ENCOUNTER
Wife Landry calling for patient who just tested positive for Covid, consent to communicate on file. Patient has an LVAD and care coordinator advised to get on Paxlovid. Patient symptoms are mild. He had been testing daily as Landry has covid. Patient symptoms are mild and declined triage.       COVID Positive/Requesting COVID treatment    Patient is positive for COVID and requesting treatment options.    Date of positive COVID test (PCR or at home)? 12/2/2023  Current COVID symptoms: cough, fatigue, and headache and he feels flush   Date COVID symptoms began: 12/1/2023    Message should be routed to clinic RN pool. Best phone number to use for call back: [phone]    Patient symptoms are mild. Primary is Dr. Estevez [facility].     Connected with scheduling for virtual visit.  Patient to call back with worsening symptoms.   Michelle Charles RN   12/02/23 1:18 PM  [facility] Nurse Advisor

## 2023-12-04 ENCOUNTER — TELEPHONE (OUTPATIENT)
Dept: FAMILY MEDICINE | Facility: OTHER | Age: 58
End: 2023-12-04

## 2023-12-04 NOTE — TELEPHONE ENCOUNTER
Please triage patient he is an LVAD patient and would likely need Paxlovid, unfortunately I would recommend he talk to an MD in regards to treatment as I do not feel comfortable treating with Paxlovid given his medications and need for anticoagulation changes and medication changes. Please follow up with provider to ensure patient has treatment plan and appointment.     Also would be helpful to find out when he developed symptoms etc.       VERONICA De La Torre CNP  Questions or concerns please feel free to send me a Zoop message or call me  Phone : [phone]

## 2023-12-04 NOTE — TELEPHONE ENCOUNTER
RN called patient. Patient states he has been having Covid symptoms for about 3.5 days. Patient is looking for paxlovid treatment. RN advised that [facility] would not be comfortable prescribing this and that he should reach out to his PCP and she is an MD and can work with his medications. Patient stated understanding and will call MD. RN reviewed red flag symptoms with patient and when to seek emergent care. No further questions or concerns.

## 2023-12-06 ENCOUNTER — ANTICOAGULATION THERAPY VISIT (OUTPATIENT)
Dept: ANTICOAGULATION | Facility: CLINIC | Age: 58
End: 2023-12-06
Payer: COMMERCIAL

## 2023-12-06 DIAGNOSIS — I50.22 CHRONIC SYSTOLIC CONGESTIVE HEART FAILURE (H): ICD-10-CM

## 2023-12-06 DIAGNOSIS — Z95.811 LVAD (LEFT VENTRICULAR ASSIST DEVICE) PRESENT (H): ICD-10-CM

## 2023-12-06 DIAGNOSIS — Z79.899 LONG TERM USE OF DRUG: ICD-10-CM

## 2023-12-06 DIAGNOSIS — Z95.811 LEFT VENTRICULAR ASSIST DEVICE PRESENT (H): ICD-10-CM

## 2023-12-06 DIAGNOSIS — Z79.01 ANTICOAGULATED ON COUMADIN: ICD-10-CM

## 2023-12-06 DIAGNOSIS — I50.23 ACUTE ON CHRONIC SYSTOLIC CONGESTIVE HEART FAILURE (H): Primary | ICD-10-CM

## 2023-12-06 LAB — INR HOME MONITORING: 2.2 (ref 2–3)

## 2023-12-06 NOTE — PROGRESS NOTES
ANTICOAGULATION MANAGEMENT     Akshat Fragoso 58 year old male is on warfarin with therapeutic INR result. (Goal INR 2.0-3.0)    Recent labs: (last 7 days)     12/06/23  0000   INR 2.2       ASSESSMENT     Source(s): Chart Review     Warfarin doses taken: Reviewed in chart  Diet: No new diet changes identified  Medication/supplement changes: None noted  New illness, injury, or hospitalization: No  Signs or symptoms of bleeding or clotting: No  Previous result: Supratherapeutic  Additional findings:  maintenance dose decrease last encounter, 9.1%       PLAN     Recommended plan for ongoing change(s) affecting INR     Dosing Instructions: Continue your current warfarin dose with next INR in 1-2 weeks       Summary  As of 12/6/2023      Full warfarin instructions:  5 mg every Mon, Wed, Fri; 2.5 mg all other days   Next INR check:  12/20/2023               Detailed voice message left for Akshat with dosing instructions and follow up date.     Patient to recheck with home meter    Education provided:   Please call back if any changes to your diet, medications or how you've been taking warfarin  Goal range and lab monitoring: goal range and significance of current result and Importance of following up at instructed interval  Contact [phone] with any changes, questions or concerns.     Plan made per [facility] anticoagulation protocol and per LVAD protocol    TREV YLN RN  Anticoagulation Clinic  12/6/2023    _______________________________________________________________________     Anticoagulation Episode Summary       Current INR goal:  2.0-3.0   TTR:  71.1% (7.8 mo)   Target end date:  Indefinite   Send INR reminders to:  ANTICOAG LVAD    Indications    Acute on chronic systolic congestive heart failure (H) [I50.23]  LVAD (left ventricular assist device) present (H) [Z95.811]  Chronic systolic congestive heart failure (H) [I50.22]  Long term use of drug [Z79.899]  Left ventricular assist device present (H)  [Z95.811]  Anticoagulated on Coumadin [Z79.01]             Comments:  Follow VAD Anticoag protocol:Yes: HeartMate 3   Bridging: Enoxaparin   Date VAD placed: 3/23/23             Anticoagulation Care Providers       Provider Role Specialty Phone number    Kenzie Moreau MD Referring Advanced Heart Failure and Transplant Cardiology [phone]    Mile Bolivar, APRN CNP Referring Nurse Practitioner [phone]    Shaun Aguiar MD Referring Cardiovascular Disease [phone]

## 2023-12-14 LAB — INR HOME MONITORING: 2.5 (ref 2–3)

## 2023-12-15 ENCOUNTER — ANTICOAGULATION THERAPY VISIT (OUTPATIENT)
Dept: ANTICOAGULATION | Facility: CLINIC | Age: 58
End: 2023-12-15
Payer: COMMERCIAL

## 2023-12-15 DIAGNOSIS — I50.22 CHRONIC SYSTOLIC CONGESTIVE HEART FAILURE (H): ICD-10-CM

## 2023-12-15 DIAGNOSIS — I50.23 ACUTE ON CHRONIC SYSTOLIC CONGESTIVE HEART FAILURE (H): Primary | ICD-10-CM

## 2023-12-15 DIAGNOSIS — Z79.899 LONG TERM USE OF DRUG: ICD-10-CM

## 2023-12-15 DIAGNOSIS — Z79.01 ANTICOAGULATED ON COUMADIN: ICD-10-CM

## 2023-12-15 DIAGNOSIS — Z95.811 LVAD (LEFT VENTRICULAR ASSIST DEVICE) PRESENT (H): ICD-10-CM

## 2023-12-15 DIAGNOSIS — Z95.811 LEFT VENTRICULAR ASSIST DEVICE PRESENT (H): ICD-10-CM

## 2023-12-15 NOTE — PROGRESS NOTES
ANTICOAGULATION MANAGEMENT     Akshat Fragoso 58 year old male is on warfarin with therapeutic INR result. (Goal INR 2.0-3.0)    Recent labs: (last 7 days)     12/14/23  0000   INR 2.5       ASSESSMENT     Source(s): Chart Review  Previous INR was Therapeutic last visit; previously outside of goal range  Medication, diet, health changes since last INR chart reviewed; none identified         PLAN     Recommended plan for no diet, medication or health factor changes affecting INR     Dosing Instructions: Continue your current warfarin dose with next INR in 1-2 weeks       Summary  As of 12/15/2023      Full warfarin instructions:  5 mg every Mon, Wed, Fri; 2.5 mg all other days   Next INR check:  12/28/2023               Detailed voice message left for Akshat with dosing instructions and follow up date.     Patient to recheck with home meter    Education provided:   Please call back if any changes to your diet, medications or how you've been taking warfarin  Goal range and lab monitoring: goal range and significance of current result and Importance of following up at instructed interval  Contact [phone] with any changes, questions or concerns.     Plan made per [facility] anticoagulation protocol and per LVAD protocol    TREV LYN RN  Anticoagulation Clinic  12/15/2023    _______________________________________________________________________     Anticoagulation Episode Summary       Current INR goal:  2.0-3.0   TTR:  72.0% (8 mo)   Target end date:  Indefinite   Send INR reminders to:  ANTICOAG LVAD    Indications    Acute on chronic systolic congestive heart failure (H) [I50.23]  LVAD (left ventricular assist device) present (H) [Z95.811]  Chronic systolic congestive heart failure (H) [I50.22]  Long term use of drug [Z79.899]  Left ventricular assist device present (H) [Z95.811]  Anticoagulated on Coumadin [Z79.01]             Comments:  Follow VAD Anticoag protocol:Yes: HeartMate 3   Bridging: Enoxaparin   Date VAD  placed: 3/23/23             Anticoagulation Care Providers       Provider Role Specialty Phone number    Kenzie Moreau MD Referring Advanced Heart Failure and Transplant Cardiology [phone]    Mile Bolivar, APRN CNP Referring Nurse Practitioner [phone]    Shaun Aguiar MD Referring Cardiovascular Disease [phone]

## 2023-12-23 ENCOUNTER — MYC REFILL (OUTPATIENT)
Dept: CARDIOLOGY | Facility: CLINIC | Age: 58
End: 2023-12-23
Payer: COMMERCIAL

## 2023-12-23 DIAGNOSIS — I50.22 CHRONIC SYSTOLIC (CONGESTIVE) HEART FAILURE (H): ICD-10-CM

## 2023-12-23 DIAGNOSIS — I50.22 CHRONIC SYSTOLIC CONGESTIVE HEART FAILURE (H): ICD-10-CM

## 2023-12-23 DIAGNOSIS — Z95.811 LEFT VENTRICULAR ASSIST DEVICE PRESENT (H): ICD-10-CM

## 2023-12-23 DIAGNOSIS — Z95.811 LVAD (LEFT VENTRICULAR ASSIST DEVICE) PRESENT (H): ICD-10-CM

## 2023-12-26 RX ORDER — EPLERENONE 25 MG/1
TABLET, FILM COATED ORAL
Qty: 45 TABLET | Refills: 3 | Status: SHIPPED | OUTPATIENT
Start: 2023-12-26

## 2023-12-26 RX ORDER — DIGOXIN 250 MCG
250 TABLET ORAL DAILY
Qty: 30 TABLET | Refills: 11 | Status: SHIPPED | OUTPATIENT
Start: 2023-12-26

## 2023-12-26 RX ORDER — AMIODARONE HYDROCHLORIDE 200 MG/1
200 TABLET ORAL DAILY
Qty: 30 TABLET | Refills: 11 | Status: SHIPPED | OUTPATIENT
Start: 2023-12-26 | End: 2024-04-10

## 2023-12-26 RX ORDER — LISINOPRIL 5 MG/1
5 TABLET ORAL DAILY
Qty: 90 TABLET | Refills: 3 | Status: SHIPPED | OUTPATIENT
Start: 2023-12-26 | End: 2024-07-24

## 2023-12-26 RX ORDER — PANTOPRAZOLE SODIUM 20 MG/1
20 TABLET, DELAYED RELEASE ORAL
Qty: 90 TABLET | Refills: 3 | Status: SHIPPED | OUTPATIENT
Start: 2023-12-26

## 2023-12-27 ENCOUNTER — ANTICOAGULATION THERAPY VISIT (OUTPATIENT)
Dept: ANTICOAGULATION | Facility: CLINIC | Age: 58
End: 2023-12-27

## 2023-12-27 ENCOUNTER — TELEPHONE (OUTPATIENT)
Dept: CARDIOLOGY | Facility: CLINIC | Age: 58
End: 2023-12-27

## 2023-12-27 ENCOUNTER — LAB (OUTPATIENT)
Dept: LAB | Facility: CLINIC | Age: 58
End: 2023-12-27
Payer: COMMERCIAL

## 2023-12-27 ENCOUNTER — ANCILLARY PROCEDURE (OUTPATIENT)
Dept: CARDIOLOGY | Facility: CLINIC | Age: 58
End: 2023-12-27
Attending: INTERNAL MEDICINE
Payer: COMMERCIAL

## 2023-12-27 ENCOUNTER — ANCILLARY PROCEDURE (OUTPATIENT)
Dept: CT IMAGING | Facility: CLINIC | Age: 58
End: 2023-12-27
Attending: INTERNAL MEDICINE
Payer: COMMERCIAL

## 2023-12-27 VITALS — SYSTOLIC BLOOD PRESSURE: 64 MMHG | WEIGHT: 195.9 LBS | OXYGEN SATURATION: 95 % | BODY MASS INDEX: 30.42 KG/M2

## 2023-12-27 DIAGNOSIS — I50.23 ACUTE ON CHRONIC SYSTOLIC CONGESTIVE HEART FAILURE (H): Primary | ICD-10-CM

## 2023-12-27 DIAGNOSIS — Z79.899 ENCOUNTER FOR LONG-TERM (CURRENT) USE OF MEDICATIONS: ICD-10-CM

## 2023-12-27 DIAGNOSIS — Z95.811 LEFT VENTRICULAR ASSIST DEVICE PRESENT (H): ICD-10-CM

## 2023-12-27 DIAGNOSIS — Z95.811 LVAD (LEFT VENTRICULAR ASSIST DEVICE) PRESENT (H): ICD-10-CM

## 2023-12-27 DIAGNOSIS — I50.22 CHRONIC SYSTOLIC HEART FAILURE (H): Primary | ICD-10-CM

## 2023-12-27 DIAGNOSIS — I50.22 CHRONIC SYSTOLIC HEART FAILURE (H): ICD-10-CM

## 2023-12-27 DIAGNOSIS — Z79.899 LONG TERM USE OF DRUG: ICD-10-CM

## 2023-12-27 DIAGNOSIS — T82.110A AICD LEAD MALFUNCTION: ICD-10-CM

## 2023-12-27 DIAGNOSIS — I50.22 CHRONIC SYSTOLIC CONGESTIVE HEART FAILURE (H): ICD-10-CM

## 2023-12-27 DIAGNOSIS — Z79.899 LONG TERM CURRENT USE OF AMIODARONE: ICD-10-CM

## 2023-12-27 DIAGNOSIS — Z79.899 LONG TERM USE OF DRUG: Primary | ICD-10-CM

## 2023-12-27 DIAGNOSIS — Z79.01 ANTICOAGULATED ON COUMADIN: ICD-10-CM

## 2023-12-27 LAB
ALBUMIN SERPL BCG-MCNC: 4.5 G/DL (ref 3.5–5.2)
ALP SERPL-CCNC: 95 U/L (ref 40–150)
ALT SERPL W P-5'-P-CCNC: 37 U/L (ref 0–70)
ANION GAP SERPL CALCULATED.3IONS-SCNC: 7 MMOL/L (ref 7–15)
AST SERPL W P-5'-P-CCNC: 36 U/L (ref 0–45)
BASOPHILS # BLD AUTO: 0.1 10E3/UL (ref 0–0.2)
BASOPHILS NFR BLD AUTO: 1 %
BILIRUB SERPL-MCNC: 0.6 MG/DL
BUN SERPL-MCNC: 18.4 MG/DL (ref 6–20)
CALCIUM SERPL-MCNC: 9.4 MG/DL (ref 8.6–10)
CHLORIDE SERPL-SCNC: 97 MMOL/L (ref 98–107)
CREAT SERPL-MCNC: 1.18 MG/DL (ref 0.67–1.17)
DEPRECATED HCO3 PLAS-SCNC: 31 MMOL/L (ref 22–29)
EGFRCR SERPLBLD CKD-EPI 2021: 72 ML/MIN/1.73M2
EOSINOPHIL # BLD AUTO: 0.3 10E3/UL (ref 0–0.7)
EOSINOPHIL NFR BLD AUTO: 3 %
ERYTHROCYTE [DISTWIDTH] IN BLOOD BY AUTOMATED COUNT: 16.4 % (ref 10–15)
GLUCOSE SERPL-MCNC: 118 MG/DL (ref 70–99)
HCT VFR BLD AUTO: 47.5 % (ref 40–53)
HGB BLD-MCNC: 14.7 G/DL (ref 13.3–17.7)
IMM GRANULOCYTES # BLD: 0.1 10E3/UL
IMM GRANULOCYTES NFR BLD: 1 %
INR PPP: 1.95 (ref 0.85–1.15)
LDH SERPL L TO P-CCNC: 270 U/L (ref 0–250)
LVEF ECHO: NORMAL
LYMPHOCYTES # BLD AUTO: 2.3 10E3/UL (ref 0.8–5.3)
LYMPHOCYTES NFR BLD AUTO: 20 %
Lab: NORMAL
MCH RBC QN AUTO: 27.4 PG (ref 26.5–33)
MCHC RBC AUTO-ENTMCNC: 30.9 G/DL (ref 31.5–36.5)
MCV RBC AUTO: 89 FL (ref 78–100)
MONOCYTES # BLD AUTO: 1 10E3/UL (ref 0–1.3)
MONOCYTES NFR BLD AUTO: 8 %
NEUTROPHILS # BLD AUTO: 8 10E3/UL (ref 1.6–8.3)
NEUTROPHILS NFR BLD AUTO: 67 %
NRBC # BLD AUTO: 0 10E3/UL
NRBC BLD AUTO-RTO: 0 /100
PERFORMING LABORATORY: NORMAL
PLATELET # BLD AUTO: 254 10E3/UL (ref 150–450)
POTASSIUM SERPL-SCNC: 4.8 MMOL/L (ref 3.4–5.3)
PROT SERPL-MCNC: 7.5 G/DL (ref 6.4–8.3)
RBC # BLD AUTO: 5.37 10E6/UL (ref 4.4–5.9)
SODIUM SERPL-SCNC: 135 MMOL/L (ref 135–145)
SPECIMEN STATUS: NORMAL
TEST NAME: NORMAL
WBC # BLD AUTO: 11.8 10E3/UL (ref 4–11)

## 2023-12-27 PROCEDURE — 93306 TTE W/DOPPLER COMPLETE: CPT | Performed by: INTERNAL MEDICINE

## 2023-12-27 PROCEDURE — 36415 COLL VENOUS BLD VENIPUNCTURE: CPT | Performed by: PATHOLOGY

## 2023-12-27 PROCEDURE — 93282 PRGRMG EVAL IMPLANTABLE DFB: CPT | Performed by: INTERNAL MEDICINE

## 2023-12-27 PROCEDURE — G0463 HOSPITAL OUTPT CLINIC VISIT: HCPCS | Mod: 25 | Performed by: PHYSICIAN ASSISTANT

## 2023-12-27 PROCEDURE — 99214 OFFICE O/P EST MOD 30 MIN: CPT | Mod: 25 | Performed by: PHYSICIAN ASSISTANT

## 2023-12-27 PROCEDURE — 99000 SPECIMEN HANDLING OFFICE-LAB: CPT | Performed by: PATHOLOGY

## 2023-12-27 PROCEDURE — 84999 UNLISTED CHEMISTRY PROCEDURE: CPT | Performed by: PHYSICIAN ASSISTANT

## 2023-12-27 PROCEDURE — 80307 DRUG TEST PRSMV CHEM ANLYZR: CPT | Performed by: PHYSICIAN ASSISTANT

## 2023-12-27 PROCEDURE — 85025 COMPLETE CBC W/AUTO DIFF WBC: CPT | Performed by: PATHOLOGY

## 2023-12-27 PROCEDURE — 83615 LACTATE (LD) (LDH) ENZYME: CPT | Performed by: PATHOLOGY

## 2023-12-27 PROCEDURE — 93750 INTERROGATION VAD IN PERSON: CPT | Performed by: PHYSICIAN ASSISTANT

## 2023-12-27 PROCEDURE — G0480 DRUG TEST DEF 1-7 CLASSES: HCPCS | Mod: 90 | Performed by: PATHOLOGY

## 2023-12-27 PROCEDURE — 99207 OXYCODONE CONFIRMATION: CPT | Mod: 90 | Performed by: PATHOLOGY

## 2023-12-27 PROCEDURE — 71250 CT THORAX DX C-: CPT | Mod: GC | Performed by: RADIOLOGY

## 2023-12-27 PROCEDURE — 80053 COMPREHEN METABOLIC PANEL: CPT | Performed by: PATHOLOGY

## 2023-12-27 PROCEDURE — 85610 PROTHROMBIN TIME: CPT | Performed by: PATHOLOGY

## 2023-12-27 RX ORDER — LIDOCAINE 40 MG/G
CREAM TOPICAL
Status: CANCELLED | OUTPATIENT
Start: 2023-12-27

## 2023-12-27 ASSESSMENT — PAIN SCALES - GENERAL: PAINLEVEL: NO PAIN (0)

## 2023-12-27 NOTE — LETTER
12/27/2023      RE: Akshat Fragoso  [address]  [facility] [address]       Dear Colleague,    Thank you for the opportunity to participate in the care of your patient, Akshat Fragoso, at the [facility] [place] [facility]. Please see a copy of my visit note below.    LVAD Clinic  RABIA follow-up     Akshat Fragoso is a 58 year old male with history of HFrEF 2/2 NICM (diagnosed in 1/2017) s/p ICD placement in 6/2017, VT/VF arrest on 3/15/2023 requiring CPR for 6 mins with cardiogenic shock s/p HM3 LVAD implantation as DT 3/23/2023 c/b postop AF (on amiodarone and digoxin) and HAP, moderate TR s/p TV annuloplasty with 32 mm MC3 partial ring 3/23/2023, PFO closure 3/23/2023, chronic tobacco/marijuana use, COPD, HTN, CKD stage III, who presents today for post LVAD implant follow up.     He was admitted on 3/15/2023 for cardiac arrest in the setting of VT that was slower than his programmed VT threshold for intervention. Patient had been undergoing workup at [facility] for LVAD as destination therapy (initially evaluated for transplant though patient having trouble quitting marijuana/tobacco use). He underwent HM3 LVAD implantation on 3/23/2023 with postop course c/b AF with RVR requiring amiodarone gtt, volume overload, and HAP treated with IV vancomycin and zosyn for 5 days. He was discharged to home on 4/9/2023.     He last saw Dr. Aguiar He was doing well. His speed was increased.     Fatigue is much better. He does get some [place]. [place]. No LE edema. No abdominal edema. No orthopnea. No PND. No lightheadedness or dizziness unless he stands up too quick. No pre-syncope or syncope. No palpitations. No chest pain. Appetite is good. No alarms on the lvad.     No blood in the urine or blood in the stool. No prolonged nosebleeds.      No driveline concerns. No fever or chills.     He has intermittent headaches  when his house his dry. Gets better with lying down. Happens a few times times a week. Takes some tylenol. No stroke symptoms.     [place]th at home without the vad has been 183 lbs.      No alcohol, drugs or tobacco.    Current cardiac medications  - Lisinopril 5 mg daily  - Inspra 12.5 m gdaily  - Empagliflozin 10 mg daily  - Bumex 1 mg PRN- has taken a few times, took it a few days ago. Uses it a couple times a mount  - Digoxin 250 mcg daily  - Amiodarone 200 mg daily  - ASA 81 mg daily  - Warfarin (INR goal 2-3)      PAST MEDICAL HISTORY:  Past Medical History:   Diagnosis Date     Acute right lumbar radiculopathy 11/02/2015     Acute systolic heart failure (H) 01/10/2017     Cardiomyopathy (H) 01/10/2017     CKD (chronic kidney disease) stage 3, GFR 30-59 ml/min (H) 01/30/2020     JOHNSON (dyspnea on exertion)      ED (erectile dysfunction) 10/02/2019     Erectile dysfunction      ICD (implantable cardioverter-defibrillator) in place- [employer], single chamber- NOT dependent 10/09/2017     Personal history of smoking 01/04/2017     Pulmonary nodules 01/17/2017    CT 11/2018:  Bilateral pulmonary nodules measuring up to 4 mm. No new or enlarging pulmonary nodules.     FAMILY HISTORY:  Family History   Problem Relation Age of Onset     Parkinsonism Mother      Atrial fibrillation Father      Heart Failure Father      Prostate Cancer Father      Skin Cancer Father      Anorexia nervosa Daughter      Other - See Comments Granddaughter         premature birth     SOCIAL HISTORY:  Social History     Socioeconomic History     Marital status:      Spouse name: Cheryl     Number of children: 2   Occupational History     Occupation: Construction      Employer: SELF   Tobacco Use     Smoking status: Former     Packs/day: 0.25     Years: 15.00     Pack years: 3.75     Types: Cigarettes     Smokeless tobacco: Former     Quit date: 3/15/2023   Vaping Use     Vaping status: Never Used   Substance and Sexual  Activity     Alcohol use: No     Alcohol/week: 0.0 standard drinks of alcohol     Drug use: No     Sexual activity: Yes     Partners: Female     Birth control/protection: Condom   Other Topics Concern     Parent/sibling w/ CABG, MI or angioplasty before 65F 55M? Yes     Comment: both parents had stints placed      CURRENT MEDICATIONS:  Current Outpatient Medications   Medication     acetaminophen (TYLENOL) 325 MG tablet     amiodarone (PACERONE) 200 MG tablet     amoxicillin (AMOXIL) 500 MG tablet     aspirin (ASA) 81 MG chewable tablet     bumetanide (BUMEX) 1 MG tablet     digoxin (LANOXIN) 250 MCG tablet     empagliflozin (JARDIANCE) 10 MG TABS tablet     eplerenone (INSPRA) 25 MG tablet     lisinopril (ZESTRIL) 5 MG tablet     pantoprazole (PROTONIX) 20 MG EC tablet     warfarin ANTICOAGULANT (COUMADIN) 5 MG tablet     No current facility-administered medications for this visit.     ROS:   See HPI     EXAM:  BP (!) 64/0 (BP Location: Right arm, Patient Position: Sitting, Cuff Size: Adult Regular)   Wt 88.9 kg (195 lb 14.4 oz)   SpO2 95%   BMI 30.42 kg/m    GENERAL: Appears comfortable, in no acute distress.   HEENT: Eye symmetrical  CV: Hum of Hm3, no adventitious sounds. JVP <6.   RESPIRATORY: Respirations regular, even, and unlabored. Lungs CTA throughout.   GI: Soft  and non distended with normoactive bowel sounds. No tenderness, rebound, guarding.   EXTREMITIES: Trace b/l peripheral edema. All extremities are warm and well perfused  NEUROLOGIC: Alert and interacting appropriately. No focal deficits.   MUSCULOSKELETAL: No joint swelling or tenderness.   SKIN: No jaundice.  DRiveline site c/d/i.       Labs:  Lab Results   Component Value Date    WBC 11.8 (H) 12/27/2023    HGB 14.7 12/27/2023    HCT 47.5 12/27/2023     12/27/2023     12/27/2023    POTASSIUM 4.8 12/27/2023    CHLORIDE 97 (L) 12/27/2023    CO2 31 (H) 12/27/2023    BUN 18.4 12/27/2023    CR 1.18 (H) 12/27/2023     (H)  12/27/2023    DD 3.60 (H) 04/18/2023    NTBNPI 31,087 (H) 03/20/2023    NTBNP 7,719 (H) 04/18/2023    TROPI 0.033 01/09/2017    AST 36 12/27/2023    ALT 37 12/27/2023    ALKPHOS 95 12/27/2023    BILITOTAL 0.6 12/27/2023    INR 1.95 (H) 12/27/2023     Diagnostics:  December 27, 2023   ICD check  Device: [employer] D150 DYNAGEN  Normal device function.  Mode: VVI 40 bpm  : <1%  Intrinsic rhythm: SR 78 bpm  Lead Trends Appear Stable: RV pace impedance measuring @ 201 ohms. Not a new finding. Pt scheduled for lead extraction 1/04/2024 w/ Dr. [place]  Estimated battery longevity to RRT = 11 years.   Atrial Arrhythmia: None.  Anticoagulant: Warfarin  Ventricular Arrhythmia: None.  Setting Changes: RV amplitude increased from 2.8 V to 3.0 V  Patient has an appointment to see Crissy Chen PA-C today.   Plan: Device follow-up every 3 months.     December 27, 2023 echo  Borderline-dilated LV with severely reduced global LV function, LVEF<20%.  LVIDd=5/8 cm.  RV function appears moderately reduced on limited views.  The aortic valve appears to remains closed on limited views. There is no AI.  LVAD inflow cannula is visualized in the LV apex. LVAD outflow graft is  visualized in the aorta. Normal Doppler interrogation of the LVAD inflow  cannula and outflow graft.  This study was compared with the study from 5/3/23: No significant changes  noted.  ___________    8/7/23 RHC  RA 8  RV 39/8  PA 39/15/24  Wedge 7  Cardiac output 4.3, cardiac index 2.3 by thermo  Cardiac output 3,   cardiac index 1.59 by Carlos  PVR: 2 Right sided filling pressures are normal. Left sided filling pressures are normal. Mild elevated pulmonary hypertension. Normal cardiac output level.     Pressures Phase: Baseline     Time Systolic (mmHg) Diastolic (mmHg) Mean (mmHg) A Wave (mmHg) V Wave (mmHg) EDP (mmHg) Max dp/dt (mmHg/sec) HR (bpm) Content (mL/dL) SAT (%)   RA Pressures 12:09 PM   8    9    13      75        RV Pressures 12:15 PM 39         9     74        PA Pressures 12:16 PM 39    15    24        74        PCW Pressures 12:15 PM   7        75        Art Pressures 11:59 AM 99    75    83        77          Blood Flow Results Phase: Baseline     Time Results Indexed Values (L/min/m2)   QP 11:55 AM 3.07 L/min    1.59      QS 11:55 AM 3.07 L/min    1.59        Blood Oximetry Phase: Baseline       Time Hb SAT(%) PO2 Content (mL/dL) PA Sat (%)   PA 11:55 AM  59 %      59      Art 11:55 AM  100 %     19.18              TTE 3/28/2023  Technically difficult study.Extremely poor acoustic windows.  Limited information was obtained during study.  LVAD cannula was seen in the expected anatomic position in the LV apex.  HM3 at 5200RPM.  Septum normal.  LVIDd 56mm.  Aortic valve not well visualized. No AI.  Normal outflow velocity.  Global right ventricular function is moderately to severely reduced.  Small pericardial effusion with organizing material in pericardial cavity.     TTE 5/3/23:  Please refer to the EPIC report for measurements performed at different LVAD speed settings.  HM3 at 5200RPM at baseline.  LVIDd 43mm.  Septum normal.  Aortic valve remain closed at baseline.     Assessment and Plan:   Akshat Fragoso is a 58 year old male with history of HFrEF 2/2 NICM (diagnosed in 1/2017) s/p ICD placement in 6/2017, VT/VF arrest on 3/15/2023 requiring CPR for 6 mins with cardiogenic shock s/p HM3 LVAD implantation as DT 3/23/2023 c/b postop AF (on amiodarone and digoxin) and HAP, moderate TR s/p TV annuloplasty with 32 mm MC3 partial ring 3/23/2023, PFO closure 3/23/2023, chronic tobacco/marijuana use, COPD, HTN, CKD stage III, who presents today for post LVAD implant follow up.    He is doing well. He hasn't been doing drug testing. He tells me today this is because he doesn't know if he would want to go through with transplant. I encouraged him to go through with testing regardless (knowing that this is NOT a commitment to evaluation for transplant or  transplant intelf). However, proving sustained sobreity takes time, and this would help us leave the door open for if he were to become interested in transplant in the future or if he were to have a more emergent complication. He understands and will let us know in a month. He denies using alcohol, tobacco, or drugs.    His LVIDd is bigger than it was in May despite increased speed. No clinical signs of HF. His AV is closed, so his LV is unloading and in combination with no low flow alarms is reassuring against an outflow graft obstruction. We will go ahead with his RHC at his annual visit, but I do not think we need to assess that more urgently unless he develops symptoms.    His GDMT is max'd based on BP.    # Chronic systolic heart failure secondary to NICM with cardiogenic shock s/p HM III on 3/23/23 LVAD. ACC/AHA Stage D, NYHA Class III  Moderate TR s/p TV annuloplasty with 32 mm MC3 partial ring 3/23/2023.  Has a history of NICM diagnosed in 1/2017 and underwent ICD for primary SCD prophylaxis in 4/2017. Patient had been undergoing workup at [facility] for LVAD as destination therapy (initially evaluated for transplant though patient having trouble quitting marijuana/tobacco use). He had cardiac arrest in the setting of VT that was slower than his programmed VT threshold for intervention on 3/15/2023. Received CPR for 6 mins with ROSC and developed cardiogenic shock. During hospitalization, he underwent HM3 LVAD implantation on 3/23/2023 with postop course c/b AF with RVR requiring amiodarone gtt, volume overload, and HAP treated with IV vancomycin and zosyn for 5 days. He was discharged to home on 4/9/2023.    Fluid status euvolemic- continue bumex 1 mg PRN. Uses just a few times a month  ACEi/ARB continue lisinopril 5 mg daily  BB deferred given recent LVAD implant  Aldosterone antagonist Inspra 12.5 mg daily  SCD prophylaxis ICD  NSAID use: contraindicated  Sleep Apnea Evaluation: not discussed today  BP: MAP  goal 65-85, within goal at 64 today  LDH trends: 270, stable, still establishing b/l  Anticoagulation: warfarin INR goal 2-3, today 1.95  Antiplatelet: Will stop ASA after his upcoming procedure once his INR is back at goal, per patient preference     VAD Interrogation on December 27, 2023 VAD interrogation reviewed with VAD coordinator. Agree with findings. Occasional PI events. No power spikes, speed drops, or other findings suspicious of pump malfunction noted.       # Persistent lukocytosis.   ID noted no indication for long term antibiotics inpatient given no acute findings on CT while inpatient (he had been treated for HAP with IV antibiotics while inpatient). No signs or symptoms of infection today.  He did have a pre-op luekocytosis longstanding around 11-12, if persistent leukocytosis after the acute post operative period could consider hematology  - WBC down to 11.6, the lowest in recent history  - Continue to trend with each appointment or sooner if he develops any symptoms    # Afib with RVR, resolved.   - Continue Amiodarone and Digoxin.  - Warfarin with INR goal 2-3    # Moderate TR s/p TV annuloplasty with 32 mm MC3 partial ring 3/23/2023  PFO closure 3/23/2023  - Trend on ECHOs    # Chronic tobacco/marijuana use  - Discussed today as he has not been testing, see conversation above, patient says not testing because he might not want a transplant  - He will let us know if he is ready in 1 month, I encouraged him to do so if he is sober so that we leave that door open if he were to develop an time-sensitive complication     # HTN.   - MAP within goal, continue the above regimen    Follow-up:  - Consider drug testing in 3 months  - Dr. Aguiar in 3 months  - VAD RABIA in 6 months     Billing  - I managed 2 stable chronic conditions  - I reviewed 4 tests  - I changed a medication    Elba Chen PA-C  Advacned Heart Failure  [facility]

## 2023-12-27 NOTE — TELEPHONE ENCOUNTER
The patient called in to clinic and is asking where the incision will be on his chest.     Emma Waggoner  Periop Electrophysiology   [phone]

## 2023-12-27 NOTE — NURSING NOTE
MCS VAD Pump Info       Row Name 12/27/23 1429             MCS VAD Information    Implant LVAD      LVAD Pump HeartMate 3         Heartmate 3 LEFT VS    Flow (Lpm) 4.4 Lpm      Pulse Index (PI) 3 PI      Speed (rpm) 5100 rpm      Power (mari) 3.7 mari      Current Hct setting 47.5      Retired: Unexpected Alarms --         Primary Controller    Serial number [id number]      Low flow alarm setting 2.5      High watt alarm setting na      EBB: Patient use 12      Replace in 21 Months         Backup Controller    Serial number [id number]      EBB: Patient use 4      Replace EBB in 22 Months      Speed & HCT match primary controller Y         VAD Interrogation    Alarms reported by patient N      Unexpected alarms noted upon interrogation None      PI events Frequent      Damage to equipment is noted N      Action taken Reviewed proper equipment care and maintenance         Driveline Exit Site    Dressing change done N      Driveline properly secured Yes      DLES assessment c/d/i      Dressing used Daily kit  I encouraged he try again the Weekly dsng change kits.      Frequency patient changes dressing Daily      Dressing modifications --      Dressing change supplier --                      Education Complete: Yes   Charge the BACKUP controller s backup battery every 6 months  Perform a self test on BACKUP every 6 months  Change the MPU s batteries every 6 months:Yes  Have equipment serviced yearly (if applicable):Not today.    Substance Abuse Testing.    I talked to Akshat today to review protocol for substance abuse testing to achieve eligibility for transplant listing. Reviewed the following criteria:   1. Pt will maintain abstinence from nicotine, alcohol, and any other recreational drugs.   2. Pt will be called at random once monthly by VAD Coordinator with request to go to lab for substance abuse testing. Labs may be blood/serum or urine.   3. Pt will get labs collected within 24hr of request. If pt does not get  labs collected within timeframe, any time accrued of negative testing is restarted. Pt will require 3-6 months of negative testing to be eligible to be listed for transplant.   4. If pt has a positive result, any time accrued of negative testing is restarted.   Pt verbalized understanding of criteria, and did not have any questions or concerns.       The patient said he is not ready yet to do the random drug testing because he feels like he is being pushed towards transplant. He said when he is ready to test, then he will be OK with the monthly random drug testing. He voiced understanding of the substance abuse testing protocol as stated above. He had drug testing done today, 12/27/2023.

## 2023-12-27 NOTE — PROGRESS NOTES
ANTICOAGULATION MANAGEMENT     Akshat Fragoso 58 year old male is on warfarin with subtherapeutic INR result. (Goal INR 2.0-3.0)    Recent labs: (last 7 days)     12/27/23  0956   INR 1.95*       ASSESSMENT     Source(s): Chart Review  Previous INR was Therapeutic last 2(+) visits  Pt is scheduled for a procedure on 1/4/24: Pt is having an extraction of electrode lead using laser under anesthesia.  Per 11/13/23 mychart message, pt is to hold warfarin on 1/2/24 and on 1/3/24 (2 days prior to procedure)  Left a detailed voicemail with this information. Also sent a mychart as well.         PLAN     Recommended plan for temporary change(s) affecting INR     Dosing Instructions:  Continue current warfarin maintenance dose until two days prior to procedure hold on 1/2/24 and on 1/3/24 then on the evening of the procedure if approved by cardiology, take 5 mg  with next INR in 1 week       Summary  As of 12/27/2023      Full warfarin instructions:  1/2: Hold; 1/3: Hold; 1/4: 5 mg; Otherwise 5 mg every Mon, Wed, Fri; 2.5 mg all other days   Next INR check:  1/4/2024               Detailed voice message left for Akshat with dosing instructions and follow up date.   Sent Castle[employer] message with dosing and follow up instructions    Procedure date is 1/4/24 and will most likely have an INR checked then as well.    Education provided:   Please call back if any changes to your diet, medications or how you've been taking warfarin  Contact [phone] with any changes, questions or concerns.     Plan made with [facility] Pharmacist Jillian Muñoz and per LVAD protocol    Marcia Park, RN  Anticoagulation Clinic  12/27/2023    _______________________________________________________________________     Anticoagulation Episode Summary       Current INR goal:  2.0-3.0   TTR:  73.0% (8.5 mo)   Target end date:  Indefinite   Send INR reminders to:  ANTICOAG LVAD    Indications    Acute on chronic systolic congestive heart failure (H) [I50.23]  LVAD  (left ventricular assist device) present (H) [Z95.811]  Chronic systolic congestive heart failure (H) [I50.22]  Long term use of drug [Z79.899]  Left ventricular assist device present (H) [Z95.811]  Anticoagulated on Coumadin [Z79.01]             Comments:  Follow VAD Anticoag protocol:Yes: HeartMate 3   Bridging: Enoxaparin   Date VAD placed: 3/23/23             Anticoagulation Care Providers       Provider Role Specialty Phone number    Kenzie Moreau MD Referring Advanced Heart Failure and Transplant Cardiology [phone]    Mile Bolivar, VERONICA CNP Referring Nurse Practitioner [phone]    Shaun Aguiar MD Referring Cardiovascular Disease [phone]

## 2023-12-27 NOTE — PROGRESS NOTES
LVAD Clinic  RABIA follow-up     Akshat Fragoso is a 58 year old male with history of HFrEF 2/2 NICM (diagnosed in 1/2017) s/p ICD placement in 6/2017, VT/VF arrest on 3/15/2023 requiring CPR for 6 mins with cardiogenic shock s/p HM3 LVAD implantation as DT 3/23/2023 c/b postop AF (on amiodarone and digoxin) and HAP, moderate TR s/p TV annuloplasty with 32 mm MC3 partial ring 3/23/2023, PFO closure 3/23/2023, chronic tobacco/marijuana use, COPD, HTN, CKD stage III, who presents today for post LVAD implant follow up.     He was admitted on 3/15/2023 for cardiac arrest in the setting of VT that was slower than his programmed VT threshold for intervention. Patient had been undergoing workup at [place] for LVAD as destination therapy (initially evaluated for transplant though patient having trouble quitting marijuana/tobacco use). He underwent HM3 LVAD implantation on 3/23/2023 with postop course c/b AF with RVR requiring amiodarone gtt, volume overload, and HAP treated with IV vancomycin and zosyn for 5 days. He was discharged to home on 4/9/2023.     He last saw Dr. Aguiar He was doing well. His speed was increased.     Fatigue is much better. He does get some [place]. [place]. No LE edema. No abdominal edema. No orthopnea. No PND. No lightheadedness or dizziness unless he stands up too quick. No pre-syncope or syncope. No palpitations. No chest pain. Appetite is good. No alarms on the lvad.     No blood in the urine or blood in the stool. No prolonged nosebleeds.      No driveline concerns. No fever or chills.     He has intermittent headaches when his house his dry. Gets better with lying down. Happens a few times times a week. Takes some tylenol. No stroke symptoms.     [place] at home without the vad has been 183 lbs.      No alcohol, drugs or tobacco.    Current cardiac medications  - Lisinopril 5 mg daily  - Inspra 12.5 m gdaily  - Empagliflozin 10 mg daily  - Bumex 1 mg PRN-  has taken a few times, took it a few days ago. Uses it a couple times a mount  - Digoxin 250 mcg daily  - Amiodarone 200 mg daily  - ASA 81 mg daily  - Warfarin (INR goal 2-3)      PAST MEDICAL HISTORY:  Past Medical History        Past Medical History:   Diagnosis Date    Acute right lumbar radiculopathy 11/02/2015    Acute systolic heart failure (H) 01/10/2017    Cardiomyopathy (H) 01/10/2017    CKD (chronic kidney disease) stage 3, GFR 30-59 ml/min (H) 01/30/2020    JOHNSON (dyspnea on exertion)      ED (erectile dysfunction) 10/02/2019    Erectile dysfunction      ICD (implantable cardioverter-defibrillator) in place- Wundrbar, single chamber- NOT dependent 10/09/2017    Personal history of smoking 01/04/2017    Pulmonary nodules 01/17/2017     CT 11/2018:  Bilateral pulmonary nodules measuring up to 4 mm. No new or enlarging pulmonary nodules.         FAMILY HISTORY:  Family History         Family History   Problem Relation Age of Onset    Parkinsonism Mother      Atrial fibrillation Father      Heart Failure Father      Prostate Cancer Father      Skin Cancer Father      Anorexia nervosa Daughter      Other - See Comments Granddaughter           premature birth         SOCIAL HISTORY:  Social History            Socioeconomic History    Marital status:        Spouse name: Cheryl    Number of children: 2   Occupational History    Occupation: Construction        Employer: SELF   Tobacco Use    Smoking status: Former       Packs/day: 0.25       Years: 15.00       Pack years: 3.75       Types: Cigarettes    Smokeless tobacco: Former       Quit date: 3/15/2023   Vaping Use    Vaping status: Never Used   Substance and Sexual Activity    Alcohol use: No       Alcohol/week: 0.0 standard drinks of alcohol    Drug use: No    Sexual activity: Yes       Partners: Female       Birth control/protection: Condom   Other Topics Concern    Parent/sibling w/ CABG, MI or angioplasty before 65F 55M? Yes        Comment: both parents had stints placed       CURRENT MEDICATIONS:      Current Outpatient Medications   Medication    acetaminophen (TYLENOL) 325 MG tablet    amiodarone (PACERONE) 200 MG tablet    amoxicillin (AMOXIL) 500 MG tablet    aspirin (ASA) 81 MG chewable tablet    bumetanide (BUMEX) 1 MG tablet    digoxin (LANOXIN) 250 MCG tablet    empagliflozin (JARDIANCE) 10 MG TABS tablet    eplerenone (INSPRA) 25 MG tablet    lisinopril (ZESTRIL) 5 MG tablet    pantoprazole (PROTONIX) 20 MG EC tablet    warfarin ANTICOAGULANT (COUMADIN) 5 MG tablet      No current facility-administered medications for this visit.      ROS:   See HPI     EXAM:***  There were no vitals taken for this visit.  GENERAL: Appears comfortable, in no acute distress.   HEENT: Eye symmetrical, no discharge or icterus bilaterally. Mucous membranes moist and without lesions.  CV: Hum of Hm3, no adventitious sounds. JVP ~6.   RESPIRATORY: Respirations regular, even, and unlabored. Lungs CTA throughout.   GI: Soft  and non distended with normoactive bowel sounds. No tenderness, rebound, guarding.   EXTREMITIES: Trace b/l peripheral edema. All extremities are warm and well perfused  NEUROLOGIC: Alert and interacting appropriately. No focal deficits.   MUSCULOSKELETAL: No joint swelling or tenderness.   SKIN: No jaundice. No rashes or lesions. Sternum healing well. Chest tube sites healing well. Both without signs of infection. DRiveline site c/d/i.        Labs:        Lab Results   Component Value Date     WBC 11.8 (H) 12/27/2023     HGB 14.7 12/27/2023     HCT 47.5 12/27/2023      12/27/2023      12/27/2023     POTASSIUM 4.8 12/27/2023     CHLORIDE 97 (L) 12/27/2023     CO2 31 (H) 12/27/2023     BUN 18.4 12/27/2023     CR 1.18 (H) 12/27/2023      (H) 12/27/2023     DD 3.60 (H) 04/18/2023     NTBNPI 31,087 (H) 03/20/2023     NTBNP 7,719 (H) 04/18/2023     TROPI 0.033 01/09/2017     AST 36 12/27/2023     ALT 37 12/27/2023      ALKPHOS 95 12/27/2023     BILITOTAL 0.6 12/27/2023     INR 1.95 (H) 12/27/2023      Diagnostics:  December 27, 2023   ICD check  Device: [employer] D150 DYNAGEN  Normal device function.  Mode: VVI 40 bpm  : <1%  Intrinsic rhythm: SR 78 bpm  Lead Trends Appear Stable: RV pace impedance measuring @ 201 ohms. Not a new finding. Pt scheduled for lead extraction 1/04/2024 w/ Dr. Montgomery  Estimated battery longevity to RRT = 11 years.   Atrial Arrhythmia: None.  Anticoagulant: Warfarin  Ventricular Arrhythmia: None.  Setting Changes: RV amplitude increased from 2.8 V to 3.0 V  Patient has an appointment to see Crissy Chen PA-C today.   Plan: Device follow-up every 3 months.     December 27, 2023 echo  ***     8/7/23 RHC  RA 8  RV 39/8  PA 39/15/24  Wedge 7  Cardiac output 4.3, cardiac index 2.3 by thermo  Cardiac output 3,   cardiac index 1.59 by Carlos  PVR: 2 Right sided filling pressures are normal. Left sided filling pressures are normal. Mild elevated pulmonary hypertension. Normal cardiac output level.      Pressures Phase: Baseline       Time Systolic (mmHg) Diastolic (mmHg) Mean (mmHg) A Wave (mmHg) V Wave (mmHg) EDP (mmHg) Max dp/dt (mmHg/sec) HR (bpm) Content (mL/dL) SAT (%)   RA Pressures 12:09 PM     8    9    13        75          RV Pressures 12:15 PM 39            9      74          PA Pressures 12:16 PM 39    15    24            74          PCW Pressures 12:15 PM     7            75          Art Pressures 11:59 AM 99    75    83            77             Blood Flow Results Phase: Baseline       Time Results Indexed Values (L/min/m2)   QP 11:55 AM 3.07 L/min    1.59      QS 11:55 AM 3.07 L/min    1.59         Blood Oximetry Phase: Baseline          Time Hb SAT(%) PO2 Content (mL/dL) PA Sat (%)   PA 11:55 AM   59 %        59      Art 11:55 AM   100 %      19.18                 TTE 3/28/2023  Technically difficult study.Extremely poor acoustic windows.  Limited information was obtained during  study.  LVAD cannula was seen in the expected anatomic position in the LV apex.  HM3 at 5200RPM.  Septum normal.  LVIDd 56mm.  Aortic valve not well visualized. No AI.  Normal outflow velocity.  Global right ventricular function is moderately to severely reduced.  Small pericardial effusion with organizing material in pericardial cavity.     TTE 5/3/23:  Please refer to the EPIC report for measurements performed at different LVAD speed settings.  HM3 at 5200RPM at baseline.  LVIDd 43mm.  Septum normal.  Aortic valve remain closed at baseline.     Assessment and Plan:   Akshat Fragoso is a 58 year old male with history of HFrEF 2/2 NICM (diagnosed in 1/2017) s/p ICD placement in 6/2017, VT/VF arrest on 3/15/2023 requiring CPR for 6 mins with cardiogenic shock s/p HM3 LVAD implantation as DT 3/23/2023 c/b postop AF (on amiodarone and digoxin) and HAP, moderate TR s/p TV annuloplasty with 32 mm MC3 partial ring 3/23/2023, PFO closure 3/23/2023, chronic tobacco/marijuana use, COPD, HTN, CKD stage III, who presents today for post LVAD implant follow up.     ***        # Chronic systolic heart failure secondary to NICM with cardiogenic shock s/p HM III on 3/23/23 LVAD. ACC/AHA Stage D, NYHA Class III  Moderate TR s/p TV annuloplasty with 32 mm MC3 partial ring 3/23/2023.  Has a history of NICM diagnosed in 1/2017 and underwent ICD for primary SCD prophylaxis in 4/2017. Patient had been undergoing workup at [place] for LVAD as destination therapy (initially evaluated for transplant though patient having trouble quitting marijuana/tobacco use). He had cardiac arrest in the setting of VT that was slower than his programmed VT threshold for intervention on 3/15/2023. Received CPR for 6 mins with ROSC and developed cardiogenic shock. During hospitalization, he underwent HM3 LVAD implantation on 3/23/2023 with postop course c/b AF with RVR requiring amiodarone gtt, volume overload, and HAP treated with IV vancomycin and zosyn for 5  days. He was discharged to home on 4/9/2023.     Fluid status euvolemic- continue bumex 1 mg PRN  ACEi/ARB continue lisinopril 5 mg daily  BB deferred given recent LVAD implant  Aldosterone antagonist aldactone 12.5 mg daily  SCD prophylaxis ICD  NSAID use: contraindicated  Sleep Apnea Evaluation: not discussed today  BP: MAP goal 65-85, within goal at 74 today  LDH trends: 256, stable, still establishing b/l  Anticoagulation: warfarin INR goal 2-3, today 2.34  Antiplatelet: ASA 81 mg daily     VAD Interrogation on 6/7/23. VAD interrogation reviewed with VAD coordinator. Agree with findings. Occasional PI events. No power spikes, speed drops, or other findings suspicious of pump malfunction noted.       # Persistent lukocytosis.   ID noted no indication for long term antibiotics inpatient given no acute findings on CT while inpatient (he had been treated for HAP with IV antibiotics while inpatient). No signs or symptoms of infection today. All surgical sites are healing well without concerns. No driveline concerns. No other infectious symptoms.  - WBC down to 15.5 from 18 on last check, continue to trend with each appointment or sooner if he develops any symptoms  - He did have a pre-op luekocytosis longstanding around 11-12, if persistent leukocytosis after the acute post operative period could consider hematology     # Afib with RVR, resolved.   - Continue Amiodarone and Digoxin.  - Warfarin with INR goal 2-3     # Moderate TR s/p TV annuloplasty with 32 mm MC3 partial ring 3/23/2023  PFO closure 3/23/2023  - Trend on ECHOs  - ***      # Chronic tobacco/marijuana use  - Did not discuss today, will need ongoing conversations regarding testing as this has been a transplant barrier     # HTN.   - MAP within goal, continue the above regimen           I appreciate the opportunity to participate in the care of Akshat Fragoso . Please do not hesitate to contact me with any further questions.     Billing  - I managed 2  stable chronic conditions  - I reviewed 4 tests     Elba Chen PA-C  Advacned Heart Failure  [facility]

## 2023-12-27 NOTE — PATIENT INSTRUCTIONS
Medications:  Please stop taking aspirin. You can wait to stop until after your ICD lead revision next week.    Instructions:  I will send a message to the EP team regarding the pre-procedure instructions and the anticoagulation plan.  We can start drug surveillance testing on a regular basis whenever you are ready to start this.  In one month, please call your VAD coordinator to tell us your thoughts on starting the heart transplant workup.  Please try using the weekly dressing change kit again. The weekly has the benefit of the Silverlon patch that provides long-lasting antibiotic coverage.  Today, please schedule the annual Right Heart Catheter, ECHO, Social Work, palliative care, and pulmonary function test for March 2024.    Follow-up: (make these appointments before you leave)  1. Please follow-up with VAD RABIA in 6 months with labs prior.   2. Please follow-up with Dr. Aguiar in 3 months with labs prior.      Page the VAD Coordinator on call if you gain more than 3 lb in a day or 5 in a week. Please also page if you feel unwell or have alarms.   Great to see you in clinic today. To Page the VAD Coordinator on call, dial [phone] option #4 and ask to speak to the VAD coordinator on call.

## 2023-12-27 NOTE — TELEPHONE ENCOUNTER
Cath Lab Case Request/Order    Location: [address] [address] [facility] Waiting Room    Procedure: Right Heart Cath (RHC)    Procedure Date: 03/27/2024    Patient Arrival Time: 9:00 AM    Procedure Time: 10:30 AM    Ordering Provider: Cosme Ribera    Performing Cardiologist: N/A    Inpatient Bed Needed: No    Post-  Procedure RABIA appointment scheduled (1 - 2 weeks): No     Date: 04/02/2024     Provider: Cosme Ribera     Communicated Patient Instructions:     NPO, nothing to eat 8 hours and drink 2 hours before arrival time: Yes     , need to arrange a ride home - unable to drive post- procedure: Yes     Adult at home, need a responsible adult to stay with patient 24 hours post- procedure: YES    Appointment was scheduled: Face to Face    Patient expressed understanding of above instructions and denied further questions at this time.    Radha Nielson

## 2023-12-27 NOTE — PROGRESS NOTES
LVAD Clinic  RABIA follow-up     Akshat Fragoso is a 58 year old male with history of HFrEF 2/2 NICM (diagnosed in 1/2017) s/p ICD placement in 6/2017, VT/VF arrest on 3/15/2023 requiring CPR for 6 mins with cardiogenic shock s/p HM3 LVAD implantation as DT 3/23/2023 c/b postop AF (on amiodarone and digoxin) and HAP, moderate TR s/p TV annuloplasty with 32 mm MC3 partial ring 3/23/2023, PFO closure 3/23/2023, chronic tobacco/marijuana use, COPD, HTN, CKD stage III, who presents today for post LVAD implant follow up.     He was admitted on 3/15/2023 for cardiac arrest in the setting of VT that was slower than his programmed VT threshold for intervention. Patient had been undergoing workup at [facility] for LVAD as destination therapy (initially evaluated for transplant though patient having trouble quitting marijuana/tobacco use). He underwent HM3 LVAD implantation on 3/23/2023 with postop course c/b AF with RVR requiring amiodarone gtt, volume overload, and HAP treated with IV vancomycin and zosyn for 5 days. He was discharged to home on 4/9/2023.     He last saw Dr. Aguiar He was doing well. His speed was increased.     Fatigue is much better. He does get some [place]ON. [place]. No LE edema. No abdominal edema. No orthopnea. No PND. No lightheadedness or dizziness unless he stands up too quick. No pre-syncope or syncope. No palpitations. No chest pain. Appetite is good. No alarms on the lvad.     No blood in the urine or blood in the stool. No prolonged nosebleeds.      No driveline concerns. No fever or chills.     He has intermittent headaches when his house his dry. Gets better with lying down. Happens a few times times a week. Takes some tylenol. No stroke symptoms.     [place]th at home without the vad has been 183 lbs.      No alcohol, drugs or tobacco.    Current cardiac medications  - Lisinopril 5 mg daily  - Inspra 12.5 m gdaily  - Empagliflozin 10 mg daily  - Bumex 1 mg PRN-  has taken a few times, took it a few days ago. Uses it a couple times a mount  - Digoxin 250 mcg daily  - Amiodarone 200 mg daily  - ASA 81 mg daily  - Warfarin (INR goal 2-3)      PAST MEDICAL HISTORY:  Past Medical History:   Diagnosis Date    Acute right lumbar radiculopathy 11/02/2015    Acute systolic heart failure (H) 01/10/2017    Cardiomyopathy (H) 01/10/2017    CKD (chronic kidney disease) stage 3, GFR 30-59 ml/min (H) 01/30/2020    JOHNSON (dyspnea on exertion)     ED (erectile dysfunction) 10/02/2019    Erectile dysfunction     ICD (implantable cardioverter-defibrillator) in place- [employer], single chamber- NOT dependent 10/09/2017    Personal history of smoking 01/04/2017    Pulmonary nodules 01/17/2017    CT 11/2018:  Bilateral pulmonary nodules measuring up to 4 mm. No new or enlarging pulmonary nodules.     FAMILY HISTORY:  Family History   Problem Relation Age of Onset    Parkinsonism Mother     Atrial fibrillation Father     Heart Failure Father     Prostate Cancer Father     Skin Cancer Father     Anorexia nervosa Daughter     Other - See Comments Granddaughter         premature birth     SOCIAL HISTORY:  Social History     Socioeconomic History    Marital status:      Spouse name: Cheryl    Number of children: 2   Occupational History    Occupation: Construction      Employer: SELF   Tobacco Use    Smoking status: Former     Packs/day: 0.25     Years: 15.00     Pack years: 3.75     Types: Cigarettes    Smokeless tobacco: Former     Quit date: 3/15/2023   Vaping Use    Vaping status: Never Used   Substance and Sexual Activity    Alcohol use: No     Alcohol/week: 0.0 standard drinks of alcohol    Drug use: No    Sexual activity: Yes     Partners: Female     Birth control/protection: Condom   Other Topics Concern    Parent/sibling w/ CABG, MI or angioplasty before 65F 55M? Yes     Comment: both parents had stints placed      CURRENT MEDICATIONS:  Current Outpatient Medications    Medication    acetaminophen (TYLENOL) 325 MG tablet    amiodarone (PACERONE) 200 MG tablet    amoxicillin (AMOXIL) 500 MG tablet    aspirin (ASA) 81 MG chewable tablet    bumetanide (BUMEX) 1 MG tablet    digoxin (LANOXIN) 250 MCG tablet    empagliflozin (JARDIANCE) 10 MG TABS tablet    eplerenone (INSPRA) 25 MG tablet    lisinopril (ZESTRIL) 5 MG tablet    pantoprazole (PROTONIX) 20 MG EC tablet    warfarin ANTICOAGULANT (COUMADIN) 5 MG tablet     No current facility-administered medications for this visit.     ROS:   See HPI     EXAM:  BP (!) 64/0 (BP Location: Right arm, Patient Position: Sitting, Cuff Size: Adult Regular)   Wt 88.9 kg (195 lb 14.4 oz)   SpO2 95%   BMI 30.42 kg/m    GENERAL: Appears comfortable, in no acute distress.   HEENT: Eye symmetrical  CV: Hum of Hm3, no adventitious sounds. JVP <6.   RESPIRATORY: Respirations regular, even, and unlabored. Lungs CTA throughout.   GI: Soft  and non distended with normoactive bowel sounds. No tenderness, rebound, guarding.   EXTREMITIES: Trace b/l peripheral edema. All extremities are warm and well perfused  NEUROLOGIC: Alert and interacting appropriately. No focal deficits.   MUSCULOSKELETAL: No joint swelling or tenderness.   SKIN: No jaundice.  DRiveline site c/d/i.       Labs:  Lab Results   Component Value Date    WBC 11.8 (H) 12/27/2023    HGB 14.7 12/27/2023    HCT 47.5 12/27/2023     12/27/2023     12/27/2023    POTASSIUM 4.8 12/27/2023    CHLORIDE 97 (L) 12/27/2023    CO2 31 (H) 12/27/2023    BUN 18.4 12/27/2023    CR 1.18 (H) 12/27/2023     (H) 12/27/2023    DD 3.60 (H) 04/18/2023    NTBNPI 31,087 (H) 03/20/2023    NTBNP 7,719 (H) 04/18/2023    TROPI 0.033 01/09/2017    AST 36 12/27/2023    ALT 37 12/27/2023    ALKPHOS 95 12/27/2023    BILITOTAL 0.6 12/27/2023    INR 1.95 (H) 12/27/2023     Diagnostics:  December 27, 2023   ICD check  Device: [employer] D150 DYNAGEN  Normal device function.  Mode: VVI 40 bpm  :  <1%  Intrinsic rhythm: SR 78 bpm  Lead Trends Appear Stable: RV pace impedance measuring @ 201 ohms. Not a new finding. Pt scheduled for lead extraction 1/04/2024 w/ Dr. Montgomery  Estimated battery longevity to RRT = 11 years.   Atrial Arrhythmia: None.  Anticoagulant: Warfarin  Ventricular Arrhythmia: None.  Setting Changes: RV amplitude increased from 2.8 V to 3.0 V  Patient has an appointment to see Crissy Chen PA-C today.   Plan: Device follow-up every 3 months.     December 27, 2023 echo  Borderline-dilated LV with severely reduced global LV function, LVEF<20%.  LVIDd=5/8 cm.  RV function appears moderately reduced on limited views.  The aortic valve appears to remains closed on limited views. There is no AI.  LVAD inflow cannula is visualized in the LV apex. LVAD outflow graft is  visualized in the aorta. Normal Doppler interrogation of the LVAD inflow  cannula and outflow graft.  This study was compared with the study from 5/3/23: No significant changes  noted.  ___________    8/7/23 RHC  RA 8  RV 39/8  PA 39/15/24  Wedge 7  Cardiac output 4.3, cardiac index 2.3 by thermo  Cardiac output 3,   cardiac index 1.59 by Carlos  PVR: 2 Right sided filling pressures are normal. Left sided filling pressures are normal. Mild elevated pulmonary hypertension. Normal cardiac output level.     Pressures Phase: Baseline     Time Systolic (mmHg) Diastolic (mmHg) Mean (mmHg) A Wave (mmHg) V Wave (mmHg) EDP (mmHg) Max dp/dt (mmHg/sec) HR (bpm) Content (mL/dL) SAT (%)   RA Pressures 12:09 PM   8    9    13      75        RV Pressures 12:15 PM 39        9     74        PA Pressures 12:16 PM 39    15    24        74        PCW Pressures 12:15 PM   7        75        Art Pressures 11:59 AM 99    75    83        77          Blood Flow Results Phase: Baseline     Time Results Indexed Values (L/min/m2)   QP 11:55 AM 3.07 L/min    1.59      QS 11:55 AM 3.07 L/min    1.59        Blood Oximetry Phase: Baseline       Time Hb SAT(%) PO2  Content (mL/dL) PA Sat (%)   PA 11:55 AM  59 %      59      Art 11:55 AM  100 %     19.18              TTE 3/28/2023  Technically difficult study.Extremely poor acoustic windows.  Limited information was obtained during study.  LVAD cannula was seen in the expected anatomic position in the LV apex.  HM3 at 5200RPM.  Septum normal.  LVIDd 56mm.  Aortic valve not well visualized. No AI.  Normal outflow velocity.  Global right ventricular function is moderately to severely reduced.  Small pericardial effusion with organizing material in pericardial cavity.     TTE 5/3/23:  Please refer to the EPIC report for measurements performed at different LVAD speed settings.  HM3 at 5200RPM at baseline.  LVIDd 43mm.  Septum normal.  Aortic valve remain closed at baseline.     Assessment and Plan:   Akshat Fragoso is a 58 year old male with history of HFrEF 2/2 NICM (diagnosed in 1/2017) s/p ICD placement in 6/2017, VT/VF arrest on 3/15/2023 requiring CPR for 6 mins with cardiogenic shock s/p HM3 LVAD implantation as DT 3/23/2023 c/b postop AF (on amiodarone and digoxin) and HAP, moderate TR s/p TV annuloplasty with 32 mm MC3 partial ring 3/23/2023, PFO closure 3/23/2023, chronic tobacco/marijuana use, COPD, HTN, CKD stage III, who presents today for post LVAD implant follow up.    He is doing well. He hasn't been doing drug testing. He tells me today this is because he doesn't know if he would want to go through with transplant. I encouraged him to go through with testing regardless (knowing that this is NOT a commitment to evaluation for transplant or transplant intelf). However, proving sustained sobreity takes time, and this would help us leave the door open for if he were to become interested in transplant in the future or if he were to have a more emergent complication. He understands and will let us know in a month. He denies using alcohol, tobacco, or drugs.    His LVIDd is bigger than it was in May despite increased speed.  No clinical signs of HF. His AV is closed, so his LV is unloading and in combination with no low flow alarms is reassuring against an outflow graft obstruction. We will go ahead with his RHC at his annual visit, but I do not think we need to assess that more urgently unless he develops symptoms.    His GDMT is max'd based on BP.    # Chronic systolic heart failure secondary to NICM with cardiogenic shock s/p HM III on 3/23/23 LVAD. ACC/AHA Stage D, NYHA Class III  Moderate TR s/p TV annuloplasty with 32 mm MC3 partial ring 3/23/2023.  Has a history of NICM diagnosed in 1/2017 and underwent ICD for primary SCD prophylaxis in 4/2017. Patient had been undergoing workup at [facility] for LVAD as destination therapy (initially evaluated for transplant though patient having trouble quitting marijuana/tobacco use). He had cardiac arrest in the setting of VT that was slower than his programmed VT threshold for intervention on 3/15/2023. Received CPR for 6 mins with ROSC and developed cardiogenic shock. During hospitalization, he underwent HM3 LVAD implantation on 3/23/2023 with postop course c/b AF with RVR requiring amiodarone gtt, volume overload, and HAP treated with IV vancomycin and zosyn for 5 days. He was discharged to home on 4/9/2023.    Fluid status euvolemic- continue bumex 1 mg PRN. Uses just a few times a month  ACEi/ARB continue lisinopril 5 mg daily  BB deferred given recent LVAD implant  Aldosterone antagonist Inspra 12.5 mg daily  SCD prophylaxis ICD  NSAID use: contraindicated  Sleep Apnea Evaluation: not discussed today  BP: MAP goal 65-85, within goal at 64 today  LDH trends: 270, stable, still establishing b/l  Anticoagulation: warfarin INR goal 2-3, today 1.95  Antiplatelet: Will stop ASA after his upcoming procedure once his INR is back at goal, per patient preference     VAD Interrogation on December 27, 2023 VAD interrogation reviewed with VAD coordinator. Agree with findings. Occasional PI events. No  power spikes, speed drops, or other findings suspicious of pump malfunction noted.       # Persistent lukocytosis.   ID noted no indication for long term antibiotics inpatient given no acute findings on CT while inpatient (he had been treated for HAP with IV antibiotics while inpatient). No signs or symptoms of infection today.  He did have a pre-op luekocytosis longstanding around 11-12, if persistent leukocytosis after the acute post operative period could consider hematology  - WBC down to 11.6, the lowest in recent history  - Continue to trend with each appointment or sooner if he develops any symptoms    # Afib with RVR, resolved.   - Continue Amiodarone and Digoxin.  - Warfarin with INR goal 2-3    # Moderate TR s/p TV annuloplasty with 32 mm MC3 partial ring 3/23/2023  PFO closure 3/23/2023  - Trend on ECHOs    # Chronic tobacco/marijuana use  - Discussed today as he has not been testing, see conversation above, patient says not testing because he might not want a transplant  - He will let us know if he is ready in 1 month, I encouraged him to do so if he is sober so that we leave that door open if he were to develop an time-sensitive complication     # HTN.   - MAP within goal, continue the above regimen    Follow-up:  - Consider drug testing in 3 months  - Dr. Aguiar in 3 months  - VAD RABIA in 6 months     Billing  - I managed 2 stable chronic conditions  - I reviewed 4 tests  - I changed a medication    Elba Chen PA-C  Advacned Heart Failure  [facility]

## 2023-12-27 NOTE — NURSING NOTE
Chief Complaint   Patient presents with    Follow Up     Return VAD     Vitals were taken and medications reconciled.    Laura Murillo CNA  12:32 PM

## 2023-12-28 NOTE — TELEPHONE ENCOUNTER
Called and left message for patient to review pre-procedure instructions for upcoming lead extraction and reimplant on 1/4/24, specifically reviewed medication holds & identified incision location. Asked for a call back/MyC message with any questions.

## 2023-12-29 LAB
LABORATORY REPORT: NORMAL
PETH INTERPRETATION: NORMAL
PLPETH BLD-MCNC: <10 NG/ML
POPETH BLD-MCNC: <10 NG/ML

## 2023-12-31 LAB
COTININE SERPL-MCNC: 21 NG/ML
NICOTINE SERPL-MCNC: <5 NG/ML

## 2024-01-01 LAB
ABO/RH(D): NORMAL
ANTIBODY SCREEN: NEGATIVE
SPECIMEN EXPIRATION DATE: NORMAL

## 2024-01-02 ENCOUNTER — TELEPHONE (OUTPATIENT)
Dept: ANTICOAGULATION | Facility: CLINIC | Age: 59
End: 2024-01-02

## 2024-01-02 ENCOUNTER — LAB (OUTPATIENT)
Dept: LAB | Facility: CLINIC | Age: 59
End: 2024-01-02
Payer: COMMERCIAL

## 2024-01-02 DIAGNOSIS — T82.110A AICD LEAD MALFUNCTION: ICD-10-CM

## 2024-01-02 PROCEDURE — 36415 COLL VENOUS BLD VENIPUNCTURE: CPT | Performed by: PATHOLOGY

## 2024-01-02 PROCEDURE — 86900 BLOOD TYPING SEROLOGIC ABO: CPT | Performed by: INTERNAL MEDICINE

## 2024-01-02 NOTE — TELEPHONE ENCOUNTER
1/2/24    [facility] for patient to call with questions about preprocedure plan.    Instructed patient:    1/2/24: no warfarin today     1/3/24: no warfarin today     1/4/24: day of procedure-take 5mg of warfarin that evening if the cardiologist approves. An INR will most likely be checked on this day as well. [facility] will contact you. If you don't hear from us, please contact us at ([phone])     Erik Fermin RN

## 2024-01-03 ENCOUNTER — ANTICOAGULATION THERAPY VISIT (OUTPATIENT)
Dept: ANTICOAGULATION | Facility: CLINIC | Age: 59
End: 2024-01-03
Payer: COMMERCIAL

## 2024-01-03 ENCOUNTER — ANESTHESIA EVENT (OUTPATIENT)
Dept: CARDIOLOGY | Facility: CLINIC | Age: 59
End: 2024-01-03
Payer: COMMERCIAL

## 2024-01-03 DIAGNOSIS — Z95.811 LVAD (LEFT VENTRICULAR ASSIST DEVICE) PRESENT (H): ICD-10-CM

## 2024-01-03 DIAGNOSIS — I50.23 ACUTE ON CHRONIC SYSTOLIC CONGESTIVE HEART FAILURE (H): Primary | ICD-10-CM

## 2024-01-03 DIAGNOSIS — Z95.811 LEFT VENTRICULAR ASSIST DEVICE PRESENT (H): ICD-10-CM

## 2024-01-03 DIAGNOSIS — Z79.899 LONG TERM USE OF DRUG: ICD-10-CM

## 2024-01-03 DIAGNOSIS — Z79.01 ANTICOAGULATED ON COUMADIN: ICD-10-CM

## 2024-01-03 DIAGNOSIS — I50.22 CHRONIC SYSTOLIC CONGESTIVE HEART FAILURE (H): ICD-10-CM

## 2024-01-03 LAB
INR HOME MONITORING: 2.1 (ref 2–3)
OXYCODONE SERPL-MCNC: POSITIVE NG/ML
OXYCODONE SERPLBLD CFM-MCNC: 11.7 NG/ML
OXYMORPHONE SERPL-MCNC: NEGATIVE NG/ML

## 2024-01-03 NOTE — PROGRESS NOTES
ANTICOAGULATION MANAGEMENT     Akshat Fragoso 58 year old male is on warfarin with therapeutic INR result. (Goal INR 2.0-3.0)    Recent labs: (last 7 days)     01/03/24  0000   INR 2.1       ASSESSMENT     Source(s): Chart Review  Previous INR was Subtherapeutic  Medication, diet, health changes since last INR - has EXTRACTION, ELECTRODE LEAD, MYOCARDIAL, USING LASER, WITH ANESTHESIA  scheduled for tomorrow. He was advised to hold coumadin two days prior (1/2 and 1/3). VM left to hold tonight again, take 5 mg (booster dose) tomorrow as long as its okay with the provider doing the procedure, resume 5 mg Mon, Wed, Fri and 2.5 mg ROW, recheck a week after restarting coumadin (1/11).          PLAN     Recommended plan for temporary change(s) affecting INR     Dosing Instructions:  hold tonight, take 5 mg booster 1/4, then resume 5 mg Mon, Wed, Fri and 2.5 mg ROW  with next INR in 8 days       Summary  As of 1/3/2024      Full warfarin instructions:  1/3: Hold; 1/4: 5 mg; Otherwise 5 mg every Mon, Wed, Fri; 2.5 mg all other days   Next INR check:  1/11/2024               Detailed voice message left for Akshat with dosing instructions and follow up date.     Patient to recheck with home meter    Education provided:   Please call back if any changes to your diet, medications or how you've been taking warfarin    Plan made per [facility] anticoagulation protocol and per LVAD protocol    Mary Alcantara, RN  Anticoagulation Clinic  1/3/2024    _______________________________________________________________________     Anticoagulation Episode Summary       Current INR goal:  2.0-3.0   TTR:  72.9% (8.7 mo)   Target end date:  Indefinite   Send INR reminders to:  ANTICOAG LVAD    Indications    Acute on chronic systolic congestive heart failure (H) [I50.23]  LVAD (left ventricular assist device) present (H) [Z95.811]  Chronic systolic congestive heart failure (H) [I50.22]  Long term use of drug [Z79.899]  Left ventricular assist device  present (H) [Z95.811]  Anticoagulated on Coumadin [Z79.01]             Comments:  Follow VAD Anticoag protocol:Yes: HeartMate 3   Bridging: Enoxaparin   Date VAD placed: 3/23/23             Anticoagulation Care Providers       Provider Role Specialty Phone number    Kenzie Moreau MD Referring Advanced Heart Failure and Transplant Cardiology [phone]    Mile Bolivar, APRN CNP Referring Nurse Practitioner [phone]    Shaun Aguiar MD Referring Cardiovascular Disease [phone]

## 2024-01-04 ENCOUNTER — HOSPITAL ENCOUNTER (OUTPATIENT)
Facility: CLINIC | Age: 59
Discharge: HOME | End: 2024-01-05
Attending: INTERNAL MEDICINE | Admitting: INTERNAL MEDICINE
Payer: COMMERCIAL

## 2024-01-04 ENCOUNTER — ANESTHESIA (OUTPATIENT)
Dept: CARDIOLOGY | Facility: CLINIC | Age: 59
End: 2024-01-04
Payer: COMMERCIAL

## 2024-01-04 ENCOUNTER — APPOINTMENT (OUTPATIENT)
Dept: GENERAL RADIOLOGY | Facility: CLINIC | Age: 59
End: 2024-01-04
Attending: NURSE PRACTITIONER
Payer: COMMERCIAL

## 2024-01-04 DIAGNOSIS — Z95.810 S/P ICD (INTERNAL CARDIAC DEFIBRILLATOR) PROCEDURE: Primary | ICD-10-CM

## 2024-01-04 DIAGNOSIS — Z95.811 LVAD (LEFT VENTRICULAR ASSIST DEVICE) PRESENT (H): ICD-10-CM

## 2024-01-04 DIAGNOSIS — T82.110A AICD LEAD MALFUNCTION: ICD-10-CM

## 2024-01-04 LAB
ABO/RH(D): NORMAL
ANION GAP SERPL CALCULATED.3IONS-SCNC: 11 MMOL/L (ref 7–15)
ANTIBODY SCREEN: NEGATIVE
BLD PROD TYP BPU: NORMAL
BLD PROD TYP BPU: NORMAL
BLOOD COMPONENT TYPE: NORMAL
BLOOD COMPONENT TYPE: NORMAL
BUN SERPL-MCNC: 17.6 MG/DL (ref 6–20)
CALCIUM SERPL-MCNC: 9.2 MG/DL (ref 8.6–10)
CHLORIDE SERPL-SCNC: 97 MMOL/L (ref 98–107)
CODING SYSTEM: NORMAL
CODING SYSTEM: NORMAL
CREAT SERPL-MCNC: 1.22 MG/DL (ref 0.67–1.17)
CROSSMATCH: NORMAL
CROSSMATCH: NORMAL
DEPRECATED HCO3 PLAS-SCNC: 26 MMOL/L (ref 22–29)
EGFRCR SERPLBLD CKD-EPI 2021: 70 ML/MIN/1.73M2
ERYTHROCYTE [DISTWIDTH] IN BLOOD BY AUTOMATED COUNT: 17.1 % (ref 10–15)
GLUCOSE BLDC GLUCOMTR-MCNC: 128 MG/DL (ref 70–99)
GLUCOSE SERPL-MCNC: 95 MG/DL (ref 70–99)
HCT VFR BLD AUTO: 46.9 % (ref 40–53)
HGB BLD-MCNC: 14.2 G/DL (ref 13.3–17.7)
INR PPP: 1.51 (ref 0.85–1.15)
INR PPP: 1.59 (ref 0.85–1.15)
LACTATE SERPL-SCNC: 1.8 MMOL/L (ref 0.7–2)
LACTATE SERPL-SCNC: 2.2 MMOL/L (ref 0.7–2)
MCH RBC QN AUTO: 26.9 PG (ref 26.5–33)
MCHC RBC AUTO-ENTMCNC: 30.3 G/DL (ref 31.5–36.5)
MCV RBC AUTO: 89 FL (ref 78–100)
PLATELET # BLD AUTO: 285 10E3/UL (ref 150–450)
POTASSIUM SERPL-SCNC: 4.1 MMOL/L (ref 3.4–5.3)
RBC # BLD AUTO: 5.28 10E6/UL (ref 4.4–5.9)
SODIUM SERPL-SCNC: 134 MMOL/L (ref 135–145)
SPECIMEN EXPIRATION DATE: NORMAL
UNIT ABO/RH: NORMAL
UNIT ABO/RH: NORMAL
UNIT NUMBER: NORMAL
UNIT NUMBER: NORMAL
UNIT STATUS: NORMAL
UNIT STATUS: NORMAL
UNIT TYPE ISBT: 5100
UNIT TYPE ISBT: 5100
WBC # BLD AUTO: 14.8 10E3/UL (ref 4–11)

## 2024-01-04 PROCEDURE — C1769 GUIDE WIRE: HCPCS | Performed by: INTERNAL MEDICINE

## 2024-01-04 PROCEDURE — 33244 REMOVE ELCTRD TRANSVENOUSLY: CPT | Mod: GC | Performed by: INTERNAL MEDICINE

## 2024-01-04 PROCEDURE — 250N000011 HC RX IP 250 OP 636: Performed by: ANESTHESIOLOGY

## 2024-01-04 PROCEDURE — 250N000024 HC ISOFLURANE, PER MIN: Performed by: INTERNAL MEDICINE

## 2024-01-04 PROCEDURE — 93005 ELECTROCARDIOGRAM TRACING: CPT

## 2024-01-04 PROCEDURE — C1887 CATHETER, GUIDING: HCPCS | Performed by: INTERNAL MEDICINE

## 2024-01-04 PROCEDURE — 272N000001 HC OR GENERAL SUPPLY STERILE: Performed by: INTERNAL MEDICINE

## 2024-01-04 PROCEDURE — 250N000011 HC RX IP 250 OP 636: Performed by: INTERNAL MEDICINE

## 2024-01-04 PROCEDURE — 36415 COLL VENOUS BLD VENIPUNCTURE: CPT | Performed by: INTERNAL MEDICINE

## 2024-01-04 PROCEDURE — C1895 LEAD, AICD, ENDO DUAL COIL: HCPCS | Performed by: INTERNAL MEDICINE

## 2024-01-04 PROCEDURE — 85610 PROTHROMBIN TIME: CPT | Mod: 91 | Performed by: STUDENT IN AN ORGANIZED HEALTH CARE EDUCATION/TRAINING PROGRAM

## 2024-01-04 PROCEDURE — 85610 PROTHROMBIN TIME: CPT | Performed by: INTERNAL MEDICINE

## 2024-01-04 PROCEDURE — 250N000009 HC RX 250: Performed by: INTERNAL MEDICINE

## 2024-01-04 PROCEDURE — 86923 COMPATIBILITY TEST ELECTRIC: CPT

## 2024-01-04 PROCEDURE — C1894 INTRO/SHEATH, NON-LASER: HCPCS | Performed by: INTERNAL MEDICINE

## 2024-01-04 PROCEDURE — 93010 ELECTROCARDIOGRAM REPORT: CPT | Performed by: INTERNAL MEDICINE

## 2024-01-04 PROCEDURE — 999N000141 HC STATISTIC PRE-PROCEDURE NURSING ASSESSMENT: Performed by: INTERNAL MEDICINE

## 2024-01-04 PROCEDURE — 710N000010 HC RECOVERY PHASE 1, LEVEL 2, PER MIN: Performed by: INTERNAL MEDICINE

## 2024-01-04 PROCEDURE — 86900 BLOOD TYPING SEROLOGIC ABO: CPT | Performed by: INTERNAL MEDICINE

## 2024-01-04 PROCEDURE — 410N000003 HC PER-PERFUSION 1ST 30 MIN: Performed by: INTERNAL MEDICINE

## 2024-01-04 PROCEDURE — 370N000017 HC ANESTHESIA TECHNICAL FEE, PER MIN: Performed by: INTERNAL MEDICINE

## 2024-01-04 PROCEDURE — 999N000054 HC STATISTIC EKG NON-CHARGEABLE

## 2024-01-04 PROCEDURE — 360N000086 HC SURGERY LEVEL 6 W/ FLUORO, PER MIN: Performed by: INTERNAL MEDICINE

## 2024-01-04 PROCEDURE — 999N000065 XR CHEST PORT 1 VIEW

## 2024-01-04 PROCEDURE — 71045 X-RAY EXAM CHEST 1 VIEW: CPT | Mod: 26 | Performed by: RADIOLOGY

## 2024-01-04 PROCEDURE — C1753 CATH, INTRAVAS ULTRASOUND: HCPCS | Performed by: INTERNAL MEDICINE

## 2024-01-04 PROCEDURE — 258N000003 HC RX IP 258 OP 636: Performed by: ANESTHESIOLOGY

## 2024-01-04 PROCEDURE — 258N000003 HC RX IP 258 OP 636: Performed by: INTERNAL MEDICINE

## 2024-01-04 PROCEDURE — 99223 1ST HOSP IP/OBS HIGH 75: CPT | Mod: 25 | Performed by: INTERNAL MEDICINE

## 2024-01-04 PROCEDURE — 80048 BASIC METABOLIC PNL TOTAL CA: CPT | Performed by: INTERNAL MEDICINE

## 2024-01-04 PROCEDURE — 33216 INSERT 1 ELECTRODE PM-DEFIB: CPT | Mod: GC | Performed by: INTERNAL MEDICINE

## 2024-01-04 PROCEDURE — 272N000002 HC OR SUPPLY OTHER OPNP: Performed by: INTERNAL MEDICINE

## 2024-01-04 PROCEDURE — 250N000013 HC RX MED GY IP 250 OP 250 PS 637: Performed by: NURSE PRACTITIONER

## 2024-01-04 PROCEDURE — 83605 ASSAY OF LACTIC ACID: CPT | Performed by: INTERNAL MEDICINE

## 2024-01-04 PROCEDURE — C1777 LEAD, AICD, ENDO SINGLE COIL: HCPCS | Performed by: INTERNAL MEDICINE

## 2024-01-04 PROCEDURE — 250N000009 HC RX 250: Performed by: ANESTHESIOLOGY

## 2024-01-04 PROCEDURE — 82962 GLUCOSE BLOOD TEST: CPT

## 2024-01-04 PROCEDURE — 85027 COMPLETE CBC AUTOMATED: CPT | Performed by: INTERNAL MEDICINE

## 2024-01-04 DEVICE — LEAD RELIANCE ENDOTAK DF4 AF 64CM 0273: Type: IMPLANTABLE DEVICE | Site: CHEST  WALL | Status: FUNCTIONAL

## 2024-01-04 DEVICE — WRENCH BI-DIRECTIONAL #2 6628: Type: IMPLANTABLE DEVICE | Site: CHEST | Status: FUNCTIONAL

## 2024-01-04 RX ORDER — SODIUM CHLORIDE, SODIUM LACTATE, POTASSIUM CHLORIDE, CALCIUM CHLORIDE 600; 310; 30; 20 MG/100ML; MG/100ML; MG/100ML; MG/100ML
INJECTION, SOLUTION INTRAVENOUS CONTINUOUS PRN
Status: DISCONTINUED | OUTPATIENT
Start: 2024-01-04 | End: 2024-01-04

## 2024-01-04 RX ORDER — CEPHALEXIN 500 MG/1
500 CAPSULE ORAL 3 TIMES DAILY
Qty: 15 CAPSULE | Refills: 0 | Status: SHIPPED | OUTPATIENT
Start: 2024-01-04 | End: 2024-01-09

## 2024-01-04 RX ORDER — EPLERENONE 25 MG/1
12.5 TABLET ORAL DAILY
Status: DISCONTINUED | OUTPATIENT
Start: 2024-01-05 | End: 2024-01-05

## 2024-01-04 RX ORDER — FENTANYL CITRATE 50 UG/ML
25 INJECTION, SOLUTION INTRAMUSCULAR; INTRAVENOUS EVERY 5 MIN PRN
Status: DISCONTINUED | OUTPATIENT
Start: 2024-01-04 | End: 2024-01-04 | Stop reason: HOSPADM

## 2024-01-04 RX ORDER — DEXAMETHASONE SODIUM PHOSPHATE 4 MG/ML
INJECTION, SOLUTION INTRA-ARTICULAR; INTRALESIONAL; INTRAMUSCULAR; INTRAVENOUS; SOFT TISSUE PRN
Status: DISCONTINUED | OUTPATIENT
Start: 2024-01-04 | End: 2024-01-04

## 2024-01-04 RX ORDER — ROPIVACAINE IN 0.9% SOD CHL/PF 0.1 %
.03-.125 PLASTIC BAG, INJECTION (ML) EPIDURAL CONTINUOUS
Status: DISCONTINUED | OUTPATIENT
Start: 2024-01-04 | End: 2024-01-04

## 2024-01-04 RX ORDER — ONDANSETRON 4 MG/1
4 TABLET, ORALLY DISINTEGRATING ORAL EVERY 30 MIN PRN
Status: DISCONTINUED | OUTPATIENT
Start: 2024-01-04 | End: 2024-01-04 | Stop reason: HOSPADM

## 2024-01-04 RX ORDER — OXYCODONE AND ACETAMINOPHEN 5; 325 MG/1; MG/1
1 TABLET ORAL EVERY 6 HOURS PRN
Qty: 18 TABLET | Refills: 0 | Status: SHIPPED | OUTPATIENT
Start: 2024-01-04 | End: 2024-01-09

## 2024-01-04 RX ORDER — ACETAMINOPHEN 325 MG/1
650 TABLET ORAL EVERY 4 HOURS PRN
Status: DISCONTINUED | OUTPATIENT
Start: 2024-01-04 | End: 2024-01-05 | Stop reason: HOSPADM

## 2024-01-04 RX ORDER — FENTANYL CITRATE 50 UG/ML
INJECTION, SOLUTION INTRAMUSCULAR; INTRAVENOUS PRN
Status: DISCONTINUED | OUTPATIENT
Start: 2024-01-04 | End: 2024-01-04

## 2024-01-04 RX ORDER — OXYCODONE HYDROCHLORIDE 10 MG/1
10 TABLET ORAL
Status: DISCONTINUED | OUTPATIENT
Start: 2024-01-04 | End: 2024-01-04 | Stop reason: HOSPADM

## 2024-01-04 RX ORDER — NALOXONE HYDROCHLORIDE 0.4 MG/ML
0.2 INJECTION, SOLUTION INTRAMUSCULAR; INTRAVENOUS; SUBCUTANEOUS
Status: DISCONTINUED | OUTPATIENT
Start: 2024-01-04 | End: 2024-01-05 | Stop reason: HOSPADM

## 2024-01-04 RX ORDER — NALOXONE HYDROCHLORIDE 0.4 MG/ML
0.4 INJECTION, SOLUTION INTRAMUSCULAR; INTRAVENOUS; SUBCUTANEOUS
Status: DISCONTINUED | OUTPATIENT
Start: 2024-01-04 | End: 2024-01-05 | Stop reason: HOSPADM

## 2024-01-04 RX ORDER — ASPIRIN 81 MG/1
81 TABLET, CHEWABLE ORAL DAILY
Status: DISCONTINUED | OUTPATIENT
Start: 2024-01-05 | End: 2024-01-05 | Stop reason: HOSPADM

## 2024-01-04 RX ORDER — ONDANSETRON 2 MG/ML
4 INJECTION INTRAMUSCULAR; INTRAVENOUS EVERY 30 MIN PRN
Status: DISCONTINUED | OUTPATIENT
Start: 2024-01-04 | End: 2024-01-04 | Stop reason: HOSPADM

## 2024-01-04 RX ORDER — WARFARIN SODIUM 7.5 MG/1
7.5 TABLET ORAL
Status: COMPLETED | OUTPATIENT
Start: 2024-01-04 | End: 2024-01-04

## 2024-01-04 RX ORDER — SODIUM CHLORIDE 9 MG/ML
INJECTION, SOLUTION INTRAVENOUS CONTINUOUS
Status: DISCONTINUED | OUTPATIENT
Start: 2024-01-04 | End: 2024-01-04 | Stop reason: HOSPADM

## 2024-01-04 RX ORDER — LISINOPRIL 5 MG/1
5 TABLET ORAL DAILY
Status: DISCONTINUED | OUTPATIENT
Start: 2024-01-05 | End: 2024-01-05 | Stop reason: HOSPADM

## 2024-01-04 RX ORDER — LIDOCAINE HYDROCHLORIDE 20 MG/ML
INJECTION, SOLUTION INFILTRATION; PERINEURAL PRN
Status: DISCONTINUED | OUTPATIENT
Start: 2024-01-04 | End: 2024-01-04

## 2024-01-04 RX ORDER — VASOPRESSIN IN 0.9 % NACL 2 UNIT/2ML
SYRINGE (ML) INTRAVENOUS PRN
Status: DISCONTINUED | OUTPATIENT
Start: 2024-01-04 | End: 2024-01-04

## 2024-01-04 RX ORDER — HYDROMORPHONE HYDROCHLORIDE 1 MG/ML
0.4 INJECTION, SOLUTION INTRAMUSCULAR; INTRAVENOUS; SUBCUTANEOUS EVERY 5 MIN PRN
Status: DISCONTINUED | OUTPATIENT
Start: 2024-01-04 | End: 2024-01-04 | Stop reason: HOSPADM

## 2024-01-04 RX ORDER — HYDROMORPHONE HYDROCHLORIDE 1 MG/ML
0.2 INJECTION, SOLUTION INTRAMUSCULAR; INTRAVENOUS; SUBCUTANEOUS EVERY 5 MIN PRN
Status: DISCONTINUED | OUTPATIENT
Start: 2024-01-04 | End: 2024-01-04 | Stop reason: HOSPADM

## 2024-01-04 RX ORDER — PANTOPRAZOLE SODIUM 20 MG/1
20 TABLET, DELAYED RELEASE ORAL
Status: DISCONTINUED | OUTPATIENT
Start: 2024-01-05 | End: 2024-01-05 | Stop reason: HOSPADM

## 2024-01-04 RX ORDER — CEFAZOLIN SODIUM/WATER 2 G/20 ML
2 SYRINGE (ML) INTRAVENOUS
Status: COMPLETED | OUTPATIENT
Start: 2024-01-04 | End: 2024-01-04

## 2024-01-04 RX ORDER — FENTANYL CITRATE 50 UG/ML
50 INJECTION, SOLUTION INTRAMUSCULAR; INTRAVENOUS EVERY 5 MIN PRN
Status: DISCONTINUED | OUTPATIENT
Start: 2024-01-04 | End: 2024-01-04 | Stop reason: HOSPADM

## 2024-01-04 RX ORDER — OXYCODONE AND ACETAMINOPHEN 5; 325 MG/1; MG/1
1 TABLET ORAL EVERY 4 HOURS PRN
Status: DISCONTINUED | OUTPATIENT
Start: 2024-01-04 | End: 2024-01-05 | Stop reason: HOSPADM

## 2024-01-04 RX ORDER — BUMETANIDE 1 MG/1
1 TABLET ORAL DAILY
Status: DISCONTINUED | OUTPATIENT
Start: 2024-01-05 | End: 2024-01-05 | Stop reason: HOSPADM

## 2024-01-04 RX ORDER — ONDANSETRON 2 MG/ML
INJECTION INTRAMUSCULAR; INTRAVENOUS PRN
Status: DISCONTINUED | OUTPATIENT
Start: 2024-01-04 | End: 2024-01-04

## 2024-01-04 RX ORDER — AMIODARONE HYDROCHLORIDE 200 MG/1
200 TABLET ORAL DAILY
Status: DISCONTINUED | OUTPATIENT
Start: 2024-01-05 | End: 2024-01-05 | Stop reason: HOSPADM

## 2024-01-04 RX ORDER — SODIUM CHLORIDE, SODIUM LACTATE, POTASSIUM CHLORIDE, CALCIUM CHLORIDE 600; 310; 30; 20 MG/100ML; MG/100ML; MG/100ML; MG/100ML
INJECTION, SOLUTION INTRAVENOUS CONTINUOUS
Status: DISCONTINUED | OUTPATIENT
Start: 2024-01-04 | End: 2024-01-04 | Stop reason: HOSPADM

## 2024-01-04 RX ORDER — PROPOFOL 10 MG/ML
INJECTION, EMULSION INTRAVENOUS PRN
Status: DISCONTINUED | OUTPATIENT
Start: 2024-01-04 | End: 2024-01-04

## 2024-01-04 RX ORDER — LIDOCAINE 40 MG/G
CREAM TOPICAL
Status: DISCONTINUED | OUTPATIENT
Start: 2024-01-04 | End: 2024-01-04 | Stop reason: HOSPADM

## 2024-01-04 RX ORDER — DIGOXIN 125 MCG
250 TABLET ORAL DAILY
Status: DISCONTINUED | OUTPATIENT
Start: 2024-01-05 | End: 2024-01-05 | Stop reason: HOSPADM

## 2024-01-04 RX ORDER — OXYCODONE HYDROCHLORIDE 5 MG/1
5 TABLET ORAL
Status: DISCONTINUED | OUTPATIENT
Start: 2024-01-04 | End: 2024-01-04 | Stop reason: HOSPADM

## 2024-01-04 RX ADMIN — Medication 0.5 UNITS: at 17:35

## 2024-01-04 RX ADMIN — SODIUM CHLORIDE, POTASSIUM CHLORIDE, SODIUM LACTATE AND CALCIUM CHLORIDE: 600; 310; 30; 20 INJECTION, SOLUTION INTRAVENOUS at 13:45

## 2024-01-04 RX ADMIN — NOREPINEPHRINE BITARTRATE 6.4 MCG: 1 INJECTION, SOLUTION, CONCENTRATE INTRAVENOUS at 15:13

## 2024-01-04 RX ADMIN — EPINEPHRINE 10 MCG: 1 INJECTION INTRAMUSCULAR; INTRAVENOUS; SUBCUTANEOUS at 14:46

## 2024-01-04 RX ADMIN — EPINEPHRINE 10 MCG: 1 INJECTION INTRAMUSCULAR; INTRAVENOUS; SUBCUTANEOUS at 17:28

## 2024-01-04 RX ADMIN — NOREPINEPHRINE BITARTRATE 6.4 MCG: 1 INJECTION, SOLUTION, CONCENTRATE INTRAVENOUS at 15:19

## 2024-01-04 RX ADMIN — SODIUM CHLORIDE, POTASSIUM CHLORIDE, SODIUM LACTATE AND CALCIUM CHLORIDE: 600; 310; 30; 20 INJECTION, SOLUTION INTRAVENOUS at 16:35

## 2024-01-04 RX ADMIN — LIDOCAINE HYDROCHLORIDE 100 MG: 20 INJECTION, SOLUTION INFILTRATION; PERINEURAL at 13:56

## 2024-01-04 RX ADMIN — FENTANYL CITRATE 50 MCG: 50 INJECTION INTRAMUSCULAR; INTRAVENOUS at 15:03

## 2024-01-04 RX ADMIN — EPINEPHRINE 20 MCG: 1 INJECTION INTRAMUSCULAR; INTRAVENOUS; SUBCUTANEOUS at 15:24

## 2024-01-04 RX ADMIN — EPINEPHRINE 10 MCG: 1 INJECTION INTRAMUSCULAR; INTRAVENOUS; SUBCUTANEOUS at 16:35

## 2024-01-04 RX ADMIN — OXYCODONE HYDROCHLORIDE AND ACETAMINOPHEN 1 TABLET: 5; 325 TABLET ORAL at 23:03

## 2024-01-04 RX ADMIN — ROCURONIUM BROMIDE 20 MG: 50 INJECTION, SOLUTION INTRAVENOUS at 15:15

## 2024-01-04 RX ADMIN — LIDOCAINE HYDROCHLORIDE 2 ML: 20 INJECTION, SOLUTION INFILTRATION; PERINEURAL at 12:45

## 2024-01-04 RX ADMIN — ROCURONIUM BROMIDE 30 MG: 50 INJECTION, SOLUTION INTRAVENOUS at 14:44

## 2024-01-04 RX ADMIN — LIDOCAINE HYDROCHLORIDE 2 ML: 10 INJECTION, SOLUTION INFILTRATION; PERINEURAL at 12:45

## 2024-01-04 RX ADMIN — NOREPINEPHRINE BITARTRATE 6.4 MCG: 1 INJECTION, SOLUTION, CONCENTRATE INTRAVENOUS at 17:16

## 2024-01-04 RX ADMIN — FENTANYL CITRATE 50 MCG: 50 INJECTION INTRAMUSCULAR; INTRAVENOUS at 18:03

## 2024-01-04 RX ADMIN — PROPOFOL 100 MG: 10 INJECTION, EMULSION INTRAVENOUS at 13:56

## 2024-01-04 RX ADMIN — ONDANSETRON 4 MG: 2 INJECTION INTRAMUSCULAR; INTRAVENOUS at 16:45

## 2024-01-04 RX ADMIN — EPINEPHRINE 0.03 MCG/KG/MIN: 1 INJECTION INTRAMUSCULAR; INTRAVENOUS; SUBCUTANEOUS at 14:46

## 2024-01-04 RX ADMIN — EPINEPHRINE 10 MCG: 1 INJECTION INTRAMUSCULAR; INTRAVENOUS; SUBCUTANEOUS at 17:21

## 2024-01-04 RX ADMIN — ROCURONIUM BROMIDE 20 MG: 50 INJECTION, SOLUTION INTRAVENOUS at 15:42

## 2024-01-04 RX ADMIN — MIDAZOLAM 2 MG: 1 INJECTION INTRAMUSCULAR; INTRAVENOUS at 13:49

## 2024-01-04 RX ADMIN — SUGAMMADEX 200 MG: 100 INJECTION, SOLUTION INTRAVENOUS at 17:41

## 2024-01-04 RX ADMIN — EPINEPHRINE 10 MCG: 1 INJECTION INTRAMUSCULAR; INTRAVENOUS; SUBCUTANEOUS at 16:25

## 2024-01-04 RX ADMIN — DEXAMETHASONE SODIUM PHOSPHATE 4 MG: 4 INJECTION, SOLUTION INTRA-ARTICULAR; INTRALESIONAL; INTRAMUSCULAR; INTRAVENOUS; SOFT TISSUE at 14:20

## 2024-01-04 RX ADMIN — EPINEPHRINE 10 MCG: 1 INJECTION INTRAMUSCULAR; INTRAVENOUS; SUBCUTANEOUS at 16:24

## 2024-01-04 RX ADMIN — FENTANYL CITRATE 50 MCG: 50 INJECTION INTRAMUSCULAR; INTRAVENOUS at 17:47

## 2024-01-04 RX ADMIN — FENTANYL CITRATE 50 MCG: 50 INJECTION, SOLUTION INTRAMUSCULAR; INTRAVENOUS at 18:36

## 2024-01-04 RX ADMIN — FENTANYL CITRATE 50 MCG: 50 INJECTION INTRAMUSCULAR; INTRAVENOUS at 17:11

## 2024-01-04 RX ADMIN — NOREPINEPHRINE BITARTRATE 0.03 MCG/KG/MIN: 1 INJECTION, SOLUTION, CONCENTRATE INTRAVENOUS at 15:18

## 2024-01-04 RX ADMIN — WARFARIN SODIUM 7.5 MG: 7.5 TABLET ORAL at 22:26

## 2024-01-04 RX ADMIN — EPINEPHRINE 10 MCG: 1 INJECTION INTRAMUSCULAR; INTRAVENOUS; SUBCUTANEOUS at 17:16

## 2024-01-04 RX ADMIN — NOREPINEPHRINE BITARTRATE 6.4 MCG: 1 INJECTION, SOLUTION, CONCENTRATE INTRAVENOUS at 14:49

## 2024-01-04 RX ADMIN — EPINEPHRINE 10 MCG: 1 INJECTION INTRAMUSCULAR; INTRAVENOUS; SUBCUTANEOUS at 15:29

## 2024-01-04 RX ADMIN — ROCURONIUM BROMIDE 10 MG: 50 INJECTION, SOLUTION INTRAVENOUS at 16:26

## 2024-01-04 RX ADMIN — ROCURONIUM BROMIDE 100 MG: 50 INJECTION, SOLUTION INTRAVENOUS at 13:57

## 2024-01-04 RX ADMIN — FENTANYL CITRATE 50 MCG: 50 INJECTION INTRAMUSCULAR; INTRAVENOUS at 14:57

## 2024-01-04 RX ADMIN — Medication 2 G: at 14:03

## 2024-01-04 RX ADMIN — EPINEPHRINE 10 MCG: 1 INJECTION INTRAMUSCULAR; INTRAVENOUS; SUBCUTANEOUS at 17:20

## 2024-01-04 RX ADMIN — HYDROMORPHONE HYDROCHLORIDE 0.5 MG: 1 INJECTION, SOLUTION INTRAMUSCULAR; INTRAVENOUS; SUBCUTANEOUS at 17:07

## 2024-01-04 RX ADMIN — NOREPINEPHRINE BITARTRATE 12.8 MCG: 1 INJECTION, SOLUTION, CONCENTRATE INTRAVENOUS at 17:29

## 2024-01-04 RX ADMIN — FENTANYL CITRATE 50 MCG: 50 INJECTION INTRAMUSCULAR; INTRAVENOUS at 16:46

## 2024-01-04 ASSESSMENT — COPD QUESTIONNAIRES
COPD: 1
CAT_SEVERITY: SEVERE

## 2024-01-04 ASSESSMENT — ACTIVITIES OF DAILY LIVING (ADL)
ADLS_ACUITY_SCORE: 18

## 2024-01-04 ASSESSMENT — LIFESTYLE VARIABLES: TOBACCO_USE: 1

## 2024-01-04 ASSESSMENT — NEW YORK HEART ASSOCIATION (NYHA) CLASSIFICATION: NYHA FUNCTIONAL CLASS: III

## 2024-01-04 ASSESSMENT — ENCOUNTER SYMPTOMS: DYSRHYTHMIAS: 1

## 2024-01-04 NOTE — PROGRESS NOTES
Indication of Interrogation:  Other, in for a lead extraction    Type of VAD:  Heartmate 3    Current Parameters:  Flow= 3.7 - 4.5 lpm, Speed= 5100 rpm, Power= 3.5-3.6 mari, PI (if applicable)= 2.5 - 6.5    Abnormal Alarm on History:  No    Abnormal Events/Parameters Notes:  No    Changes Made during Interrogation:  No

## 2024-01-04 NOTE — ANESTHESIA PREPROCEDURE EVALUATION
Anesthesia Pre-Procedure Evaluation    Patient: Akshat Fragsoo   MRN: [id number] : 1965        Procedure : Procedure(s):  EXTRACTION, ELECTRODE LEAD, MYOCARDIAL, USING LASER, WITH ANESTHESIA  Pacemaker or Implantable Cardioverter Defibrillator Lead Implant - One Lead           58 year old male with history of HFrEF 2/2 NICM (diagnosed in 2017) s/p ICD placement in 2017, VT/VF arrest on 3/15/2023 requiring CPR for 6 mins with cardiogenic shock s/p HM3 LVAD implantation as DT 3/23/2023 c/b postop AF (on amiodarone and digoxin) and HAP, moderate TR s/p TV annuloplasty with 32 mm MC3 partial ring 3/23/2023, PFO closure 3/23/2023, chronic tobacco/marijuana use, COPD, HTN, CKD stage III.    Past Medical History:   Diagnosis Date    Acute right lumbar radiculopathy 2015    Acute systolic heart failure (H) 01/10/2017    Cardiomyopathy (H) 01/10/2017    CKD (chronic kidney disease) stage 3, GFR 30-59 ml/min (H) 2020    JOHNSON (dyspnea on exertion)     ED (erectile dysfunction) 10/02/2019    Erectile dysfunction     ICD (implantable cardioverter-defibrillator) in place- [employer], single chamber- NOT dependent 10/09/2017    Personal history of smoking 2017    Pulmonary nodules 2017    CT 2018:  Bilateral pulmonary nodules measuring up to 4 mm. No new or enlarging pulmonary nodules.      Past Surgical History:   Procedure Laterality Date    CARDIAC SURGERY  2016    defibrillator placement    COLONOSCOPY N/A 3/22/2023    Procedure: Colonoscopy;  Surgeon: Khushboo Crespo MD;  Location:  GI    CV INTRA AORTIC BALLOON N/A 3/22/2023    Procedure: Intraprocedure Aortic Balloon Pump Insertion;  Surgeon: Danie Snyder MD;  Location:  HEART CARDIAC CATH LAB    CV RIGHT HEART CATH MEASUREMENTS RECORDED N/A 2019    Procedure: CV RIGHT HEART CATH;  Surgeon: Jeff Aguilar MD;  Location:  HEART CARDIAC CATH LAB    CV RIGHT HEART CATH MEASUREMENTS RECORDED  N/A 3/22/2023    Procedure: Right Heart Catheterization;  Surgeon: Danie Snyder MD;  Location:  HEART CARDIAC CATH LAB    CV RIGHT HEART CATH MEASUREMENTS RECORDED N/A 8/7/2023    Procedure: Heart Cath Right Heart Cath;  Surgeon: Paras Pang MD;  Location:  HEART CARDIAC CATH LAB    INSERT VENTRICULAR ASSIST DEVICE LEFT (HEARTMATE II) N/A 3/23/2023    Procedure: MEDIAN STERNOTOMY, TRANSESOPHAGEAL ECHOCARDIOGRAM PER ANESTHESIA, CARDIOPULMONARY BYPASS PUMP, INSERTION, LEFT VENTRICULAR ASSIST DEVICE (HEARTMATE IIl), TRICUSPID VALVE REPAIR WITH EDWRDS 32 MM MC3 TRICUSPID ANNULOPLASTY RING;  Surgeon: Elena Matias MD;  Location:  OR    None        Allergies   Allergen Reactions    Codeine      Other reaction(s): GI intolerance, Intolerance-Can't Take      Social History     Tobacco Use    Smoking status: Former     Packs/day: 0.25     Years: 15.00     Additional pack years: 0.00     Total pack years: 3.75     Types: Cigarettes    Smokeless tobacco: Former     Quit date: 3/15/2023   Substance Use Topics    Alcohol use: No     Alcohol/week: 0.0 standard drinks of alcohol      Wt Readings from Last 1 Encounters:   12/27/23 88.9 kg (195 lb 14.4 oz)        Anesthesia Evaluation   Pt has had prior anesthetic. Type: General.    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  Mode: VVI 40 bpm  : <1%  Intrinsic rhythm: SR 78 bpm  Lead Trends Appear Stable: RV pace impedance measuring @ 201 ohms. Not a new finding. Pt scheduled for lead extraction 1/04/2024 w/ Dr. Montgomery  Estimated battery longevity to RRT = 11 years.   Atrial Arrhythmia:  None.  Anticoagulant: Warfarin  Ventricular Arrhythmia: None.  Setting Changes: RV amplitude increased from 2.8 V to 3.0 V      (+) Dyslipidemia hypertension- -   -  - -      CHF etiology: NICM Last EF: 5-10%  NYHA classification: III.         ICD Reason placed:NICM.  type;[employer] Settings VVI  dysrhythmias (VT/VF arrest), Other and a-fib,  valvular problems/murmurs  moderate TR.   pulmonary hypertension, Previous cardiac testing   Echo: Date: 12/27/23 Results:  Left Ventricle  Left ventricular wall thickness cannot evaluate. Borderline left ventricular  dilation is present. Left ventricular function is decreased. The ejection  fraction is <30% (severely reduced). Diastolic function not assessed due to  presence of LVAD. Severe diffuse hypokinesis is present. LVAD cannula was seen  in the expected anatomic position in the LV apex. LVAD inflow cannula Doppler  is normal. Outflow graft is visualized. LVAD outflow graft Doppler is normal.     Atria  The atria cannot be assessed.     Mitral Valve  Mild mitral annular calcification is present.     Aortic Valve  The aortic valve appears to remains closed on limited views. There is no AI.     Tricuspid Valve  The tricuspid valve is normal. The peak velocity of the tricuspid regurgitant  jet is not obtainable. Pulmonary artery systolic pressure cannot be assessed.     Pulmonic Valve  The valve leaflets are not well visualized. On Doppler interrogation, there is  no significant stenosis or regurgitation.     Vessels  The aorta root cannot be assessed. Dilation of the inferior vena cava is  present with abnormal respiratory variation in diameter.     Pericardium  No pericardial effusion is present.    Stress Test:  Date: Results:    ECG Reviewed:  Date: 5/26/23 Results:  Accelerated Junctional rhythm   Low voltage QRS   Cannot rule out Inferior infarct , age undetermined   Anterolateral infarct , age undetermined     Cath:  Date: 8/7/23 Results:  RA 8  RV 39/8  PA  "39/15/24  Wedge 7  Cardiac output 4.3, cardiac index 2.3 by thermo  Cardiac output 3,   cardiac index 1.59 by Carlos  PVR: 2 Right sided filling pressures are normal. Left sided filling pressures are normal. Mild elevated pulmonary hypertension. Normal cardiac output level.        METS/Exercise Tolerance:     Hematologic:    (-) anemia   Musculoskeletal: Comment: Lumbar radiculopathy      GI/Hepatic:    (-) esophageal disease   Renal/Genitourinary:     (+) renal disease, type: CRI, Pt does not require dialysis,           Endo:    (-) Type II DM   Psychiatric/Substance Use:     (+)     Recreational drug usage: Cannabis.    Infectious Disease:    (-) Recent Fever   Malignancy:    (-) malignancy   Other:            Physical Exam    Airway        Mallampati: II   TM distance: > 3 FB   Neck ROM: full   Mouth opening: > 3 cm    Respiratory Devices and Support         Dental       (+) Completely normal teeth      Cardiovascular          Rhythm and rate: normal   (+) murmur       Pulmonary   pulmonary exam normal                OUTSIDE LABS:  CBC:   Lab Results   Component Value Date    WBC 11.8 (H) 12/27/2023    WBC 14.7 (H) 08/07/2023    HGB 14.7 12/27/2023    HGB 14.1 08/07/2023    HCT 47.5 12/27/2023    HCT 47.6 08/07/2023     12/27/2023     08/07/2023     BMP:   Lab Results   Component Value Date     12/27/2023     (L) 08/07/2023    POTASSIUM 4.8 12/27/2023    POTASSIUM 4.5 08/07/2023    CHLORIDE 97 (L) 12/27/2023    CHLORIDE 97 (L) 08/07/2023    CO2 31 (H) 12/27/2023    CO2 26 08/07/2023    BUN 18.4 12/27/2023    BUN 25.5 (H) 08/07/2023    CR 1.18 (H) 12/27/2023    CR 1.65 (H) 08/07/2023     (H) 12/27/2023     (H) 08/07/2023     COAGS:   Lab Results   Component Value Date    PTT 40 (H) 05/26/2023    INR 2.1 01/03/2024    FIBR 469 03/24/2023     POC: No results found for: \"BGM\", \"HCG\", \"HCGS\"  HEPATIC:   Lab Results   Component Value Date    ALBUMIN 4.5 12/27/2023    PROTTOTAL 7.5 " 12/27/2023    ALT 37 12/27/2023    AST 36 12/27/2023    ALKPHOS 95 12/27/2023    BILITOTAL 0.6 12/27/2023     OTHER:   Lab Results   Component Value Date    PH 7.42 03/24/2023    LACT 1.2 03/25/2023    A1C 5.5 12/04/2018    TONY 9.4 12/27/2023    PHOS 4.2 04/08/2023    MAG 2.1 05/26/2023    TSH 4.04 06/07/2023    CRP <2.9 11/19/2018       Anesthesia Plan    ASA Status:  4    NPO Status:  NPO Appropriate    Anesthesia Type: General.     - Airway: ETT   Induction: Intravenous.   Maintenance: Balanced.   Techniques and Equipment:     - Lines/Monitors: 2nd IV, Arterial Line, BIS, JOAN            JOAN Absolute Contra-indication: NONE            JOAN Relative Contra-indication: NONE     - Blood: Blood in Room, T&S     Consents    Anesthesia Plan(s) and associated risks, benefits, and realistic alternatives discussed. Questions answered and patient/representative(s) expressed understanding.     - Discussed: Risks, Benefits and Alternatives for BOTH SEDATION and the PROCEDURE were discussed     - Discussed with:  Patient      - Extended Intubation/Ventilatory Support Discussed: Yes.      - Patient is DNR/DNI Status: No     Use of blood products discussed: Yes.     - Discussed with: Patient.     - Consented: consented to blood products     Postoperative Care    Pain management: Multi-modal analgesia.   PONV prophylaxis: Ondansetron (or other 5HT-3), Dexamethasone or Solumedrol     Comments:                Avery Bernard MD

## 2024-01-04 NOTE — PROVIDER NOTIFICATION
Confirmed with YODIT Calderon that patient will be staying overnight. Transfer order to Cardiology floor is placed.

## 2024-01-04 NOTE — OR NURSING
Patient reported having milk with his medications at 0800 this morning. Joey Lombardo MD, Anesthesiologist notified as well as surgical team. Procedure to be delayed, appropriate parties notified.

## 2024-01-04 NOTE — H&P
Electrophysiology Pre-Procedure History and Physical    Akshat Fragoso MRN# [id number]   Age: 58 year old YOB: 1965      Date of Procedure: 1/4/2024  [facility]      Date of Exam 1/4/2024 Facility (Same day)       HPI:  Mr. Fragoso is a 58 year old medical history significant for NICM LVEF 12%, VT/VF arrest on 3/15/2023, s/p DT LVAD 3/23/23, moderate TV annuloplasty  with 32 mm MC3 partial ring and PFO closure 3/23/2023, s/p ICD 6/8/17, HTN, COPD, CKD III, and chronic tobacco/marijuana use. He has now had recent decrease in pacing impedance after LVAD implant with substantial decrease in sensing and gradual increase in pacing threshold. We discussed lead is showing signs of progressive microperforation. We discussed we would recommend extraction and reimplant. We had a long discussion with the patient about lead extraction. He presents today for ICD lead extraction and reimplant.          Active problem list:     Patient Active Problem List    Diagnosis Date Noted    AICD lead malfunction 01/04/2024     Priority: Medium    Chronic intractable pain 06/12/2023     Priority: Medium    Rib pain on left side 06/12/2023     Priority: Medium    Chronic bilateral low back pain, unspecified whether sciatica present 06/12/2023     Priority: Medium    Anemia, iron deficiency 05/23/2023     Priority: Medium    Anticoagulated on Coumadin 04/25/2023     Priority: Medium    Chronic systolic congestive heart failure (H) 04/10/2023     Priority: Medium    Long term use of drug 04/10/2023     Priority: Medium    Left ventricular assist device present (H) 04/10/2023     Priority: Medium    LVAD (left ventricular assist device) present (H) 04/07/2023     Priority: Medium    Ventricular tachycardia (H) 03/15/2023     Priority: Medium    COPD, severe (H) 04/12/2022     Priority: Medium     PFT's 4/10/2012:  FEV1/FVC:  64% (actual)   FEV1:   73% predicted    PFT's  11/19/2018  FEV1/FVC:  48% (actual)   FEV1:   41% predicted        Erectile dysfunction, unspecified erectile dysfunction type 10/02/2019     Priority: Medium    JOHNSON (dyspnea on exertion) 07/03/2019     Priority: Medium     Added automatically from request for surgery [id number]      Acute on chronic systolic congestive heart failure (H) 07/03/2019     Priority: Medium     Added automatically from request for surgery [id number]      Hypertrophic nonobstructive cardiomyopathy (H) 07/03/2019     Priority: Medium     Added automatically from request for surgery [id number]      Other ill-defined heart diseases 07/03/2019     Priority: Medium     Added automatically from request for surgery [id number]      Chronic systolic heart failure (H) 11/12/2018     Priority: Medium    ICD (implantable cardioverter-defibrillator) in place- [employer], single chamber- NOT dependent 10/09/2017     Priority: Medium    Personal history of tobacco use, presenting hazards to health 01/18/2017     Priority: Medium    Cardiomyopathy, nonischemic (H) 01/11/2017     Priority: Medium    CHF (congestive heart failure) (H) 01/09/2017     Priority: Medium    Lumbar radiculopathy 11/02/2015     Priority: Medium            Medications (include herbals and vitamins):      Current Facility-Administered Medications   Medication    EPINEPHrine (ADRENALINE) 5 mg in  mL infusion (PERIPHERAL)    lidocaine (LMX4) cream    lidocaine 1 % 0.1-1 mL    norepinephrine (LEVOPHED) 4 mg in  mL PERIPHERAL infusion    sodium chloride (PF) 0.9% PF flush 3 mL    sodium chloride (PF) 0.9% PF flush 3 mL    sodium chloride (PF) 0.9% PF flush 3 mL    sodium chloride 0.9 % infusion     Facility-Administered Medications Ordered in Other Encounters   Medication    dexAMETHasone (DECADRON) injection    EPINEPHRINE BOLUS 100 MCG/10 ML ANESTHESIA                              [id number]    fentaNYL (PF) (SUBLIMAZE) injection    lactated ringers infusion    lidocaine 2%  "injection (MDV)    midazolam (VERSED) injection    norepinephrine  bolus 6.4 mcg/mL    propofol (DIPRIVAN) injection 10 mg/mL vial    rocuronium injection           Medication List      There are no discharge medications for this visit.              Allergies:      Allergies   Allergen Reactions    Codeine      Other reaction(s): GI intolerance, Intolerance-Can't Take             Social History:     Social History     Tobacco Use    Smoking status: Former     Packs/day: 0.25     Years: 15.00     Additional pack years: 0.00     Total pack years: 3.75     Types: Cigarettes    Smokeless tobacco: Former     Quit date: 3/15/2023   Substance Use Topics    Alcohol use: No     Alcohol/week: 0.0 standard drinks of alcohol            Physical Exam:   All vitals have been reviewed  Patient Vitals for the past 8 hrs:   BP Temp Temp src Pulse Resp SpO2 Height Weight   01/04/24 0932 (!) 95/0 98.8  F (37.1  C) Oral 80 18 97 % 1.707 m (5' 7.2\") 86.1 kg (189 lb 13.1 oz)     I/O last 3 completed shifts:  In: 501 [I.V.:501]  Out: -   Airway assessment:   Patient is able to open mouth wide  Patient is able to stick out tongue}      ENT:   Normocephalic, without obvious abnormality, atraumatic, sinuses nontender on palpation, external ears without lesions, oral pharynx with moist mucous membranes, tonsils without erythema or exudates, gums normal and good dentition.     Neck:   Supple, symmetrical, trachea midline, no adenopathy, thyroid symmetric, not enlarged and no tenderness, skin normal     Lungs:   No increased work of breathing, good air exchange, clear to auscultation bilaterally, no crackles or wheezing     Cardiovascular:   VAD hum, Regular.              Lab / Radiology Results:     Lab Results   Component Value Date    WBC 14.8 01/04/2024    WBC 9.8 11/20/2019    RBC 5.28 01/04/2024    RBC 4.70 11/20/2019    HGB 14.2 01/04/2024    HGB 14.3 11/20/2019    HCT 46.9 01/04/2024    HCT 45.3 11/20/2019    MCV 89 01/04/2024    MCV 96 " 11/20/2019    RDW 17.1 01/04/2024    RDW 13.0 11/20/2019     01/04/2024     11/20/2019      Lab Results   Component Value Date    WBC 14.8 01/04/2024    WBC 9.8 11/20/2019     Lab Results   Component Value Date     01/04/2024     11/20/2019     Lab Results   Component Value Date    HGB 14.2 01/04/2024    HGB 14.3 11/20/2019    HCT 46.9 01/04/2024    HCT 45.3 11/20/2019     Lab Results   Component Value Date     01/04/2024     11/20/2019    CO2 26 01/04/2024    CO2 28 11/20/2019    BUN 17.6 01/04/2024    BUN 23 11/20/2019     Lab Results   Component Value Date     01/04/2024     11/20/2019    CO2 26 01/04/2024    CO2 28 11/20/2019    BUN 17.6 01/04/2024    BUN 23 11/20/2019     Lab Results   Component Value Date    TSH 4.04 06/07/2023    TSH 1.29 11/19/2018          Plan:   Patient's active problems diagnostically and therapeutically optimized for the planned procedure. Patient here for ICD lead extraction and reimplant. Procedure explained in detail to patient including indications, risks, and benefits. I had a long discussion with the patient and family about lead extraction.  We discussed the indications for lead extraction, the extraction procedure and the risks of extraction.  These risks include vascular tear, cardiac tear, clotting and occlusion of a vessel, infection, damage to other components of the implanted device, bleeding, death, and need for emergency cardiopulmonary bypass, sternotomy or thoracotomy.  The patient understands and wishes to proceed.  Signed consent was obtained and is on the chart. Patient states understanding and wishes to procced.     VERONICA Hood CNP  Electrophysiology Consult Service  Pager: Text Page

## 2024-01-04 NOTE — DISCHARGE INSTRUCTIONS
Home Care after an Extraction and ICD Implant    Care of groin site:        Remove the Band-Aid after 24 hours. If there is minor oozing, apply another Band-aid and remove it after 12 hours.         Do NOT take a bath, use a hot tub, pool, or submerse in water for at least 3 days You may shower.         It is normal to have a small bruise or lump at the site.        Do not scrub the site.        Do not use lotion or powder near the puncture site for 3 days.        For the first 2 days: Do not stoop or squat. When you cough, sneeze or move your bowels, hold your hand over the puncture site and press gently.        Do not lift more than 10 pounds or exertional activity for 10 days.      If you start bleeding from the site in your groin:  Lie down flat and press firmly on the site.  Call your physician immediately, or, come to the emergency room.    Call 911 right away if you have bleeding that is heavy or does not stop.     Call your doctor/provider if:        You have a large or growing hard lump around the site.        The site is red, swollen, hot or tender.        Blood or fluid is draining from the site.        You have chills or a fever greater than 101 F (38 C).        Your leg or arm turns bluish, feels numb or cool.        You have hives, a rash or unusual itching.   Wound care:  Keep your incision (surgery wound) dry for 3 days.  After 3 days, you may remove the outer bandage.  Keep the strips of tape on.  They will be removed at your clinic visit.  Check for signs of infection each day.  These include increased redness, swelling, drainage or a fever over 101 F (38.3 C).  Call us immediately if you see any of these signs.  If there are no signs of infection, you may shower in 3 days.  Do not submerge the incision (in a bath tub, hot tub, or swimming pool) until fully healed.  Pain:   You may have mild to moderate pain for 3 to 5 days.  Take acetaminophen (Tylenol) or ibuprofen (Advil) for the pain.  Call  us if the pain is severe or lasts more than 5 days.    Activity:  You should slowly go back to your normal activities after 24 hours.  Healing will take 4 to 6 weeks.  No driving for 3 days  Avoid climbing a ladder alone.  It is best to stay within 4 feet of the ground.  Avoid anything that may cause rough contact or a hard hit to your chest.  This includes football, hockey, and other contact sports.  Do not go swimming or boating alone.      For at least 4 weeks:  Do not raise your affected arm above your shoulder.  Do not use your affected arm to push, pull, or lift anything over 10 pounds.  Avoid repetitive upper body activities for 6 weeks (ie: golf, swimming, and weight lifting)    Telling others about your device:  Before you have any medical tests or treatments, tell the doctors, dentists, and other care providers about your device.  There are a few tests and treatments that may interfere with your device.  (These include MRI, radiation therapy, electrocautery, and others.)  Your care team may need to take special steps to keep you safe.  Before you leave the hospital, you will receive a temporary ID card.  A permanent card will be mailed to you about 6 to 8 weeks later.  Always carry the ID card with you.  It has important details about your device.  You should also get a MedicAlert ID.  Please ask us for a MedicAlert brochure, or go to [email or web address].    Safety near electrical equipment:  All of these are safe to use when in good repair:  Microwaves  Radios  Cordless phone  Remote controls  Small electrical tools  Cell phones: Keep cell phones at least 6 inches from your device.  Do not carry the phone in a pocket near your device.  Security reinoso: It is okay to walk through security reinoso at the airports and department stores.  Tell airport security that you have a defibrillator.  They should keep the screening wand at least 6 inches from your device.  Full-body scanners are safe.    Avoid the  following:   MRI tests in the hospital unless you have a MRI safe defibrillator.   Arc welding, chain saws and high-powered industrial or commercial tools.   Power lines, power plants and large power generators.   Electric body fat scales.   Magnetic mattress pads or pillow.    What to do after a shock from your ICD:  Stop what you re doing and rest.  If you feel fine before and after the shock, call the device clinic.  If you feel unwell or receive more than one shock, call 911 or go to the emergency room.  A shock could mean that your condition has changed and you may need to see a doctor.    Follow-Up Visits:  Return to the clinic in 7 to 10 days to have your device and wound checked.    Device follow-up after your initial clinic visit will take place every 3 months and as needed until your battery reaches end of service. The device follow up will take place in person or remotely utilizing your home monitor.    Questions?  Please call [facility]   Device Nurse:          Business Hours:  [phone]    After Hours:  [phone]   Choose option 4, then ask for the on-call device nurse at job code [id number].    Your next device clinic appointment is scheduled on:    Monday, January 15th, 2024 at 10:30 AM.                                                 [facility]  [facility] [address]  [address] [place] [address]

## 2024-01-04 NOTE — ANESTHESIA PROCEDURE NOTES
Airway       Patient location during procedure: OR       Procedure Start/Stop Times: 1/4/2024 2:00 PM  Staff -        CRNA: Stella Deshpande APRN CRNA       Performed By: CRNA  Consent for Airway        Urgency: elective  Indications and Patient Condition       Indications for airway management: chaparro-procedural       Induction type:intravenous       Mask difficulty assessment: 1 - vent by mask    Final Airway Details       Final airway type: endotracheal airway       Successful airway: ETT - single  Endotracheal Airway Details        ETT size (mm): 8.0       Cuffed: yes       Successful intubation technique: direct laryngoscopy       DL Blade Type: Wisdom 2       Grade View of Cords: 1       Adjucts: stylet       Position: Right       Measured from: lips       Secured at (cm): 24       Bite block used: None    Post intubation assessment        Placement verified by: capnometry, equal breath sounds and chest rise        Number of attempts at approach: 1       Secured with: pink tape       Ease of procedure: easy       Dentition: Intact and Unchanged    Medication(s) Administered   Medication Administration Time: 1/4/2024 2:00 PM

## 2024-01-04 NOTE — OR NURSING
Device RN paged. States that she does not need to see the patient in Pre-Op or PACU. Will see the patient on the floor following provider order. LVAD coordinator paged. Will see patient when ready for procedure. LVAD tower brought to patient's bedside.

## 2024-01-05 ENCOUNTER — APPOINTMENT (OUTPATIENT)
Dept: GENERAL RADIOLOGY | Facility: CLINIC | Age: 59
End: 2024-01-05
Attending: NURSE PRACTITIONER
Payer: COMMERCIAL

## 2024-01-05 ENCOUNTER — TELEPHONE (OUTPATIENT)
Dept: ANTICOAGULATION | Facility: CLINIC | Age: 59
End: 2024-01-05

## 2024-01-05 ENCOUNTER — ANCILLARY PROCEDURE (OUTPATIENT)
Dept: CARDIOLOGY | Facility: CLINIC | Age: 59
End: 2024-01-05
Attending: NURSE PRACTITIONER
Payer: COMMERCIAL

## 2024-01-05 ENCOUNTER — APPOINTMENT (OUTPATIENT)
Dept: ULTRASOUND IMAGING | Facility: CLINIC | Age: 59
End: 2024-01-05
Attending: INTERNAL MEDICINE
Payer: COMMERCIAL

## 2024-01-05 VITALS
HEIGHT: 67 IN | BODY MASS INDEX: 29.79 KG/M2 | DIASTOLIC BLOOD PRESSURE: 89 MMHG | OXYGEN SATURATION: 96 % | RESPIRATION RATE: 29 BRPM | TEMPERATURE: 98.2 F | WEIGHT: 189.82 LBS | HEART RATE: 102 BPM | SYSTOLIC BLOOD PRESSURE: 106 MMHG

## 2024-01-05 DIAGNOSIS — Z95.811 LVAD (LEFT VENTRICULAR ASSIST DEVICE) PRESENT (H): ICD-10-CM

## 2024-01-05 DIAGNOSIS — I50.22 CHRONIC SYSTOLIC CONGESTIVE HEART FAILURE (H): ICD-10-CM

## 2024-01-05 DIAGNOSIS — Z79.01 ANTICOAGULATED ON COUMADIN: ICD-10-CM

## 2024-01-05 DIAGNOSIS — Z79.899 LONG TERM USE OF DRUG: ICD-10-CM

## 2024-01-05 DIAGNOSIS — Z95.811 LEFT VENTRICULAR ASSIST DEVICE PRESENT (H): ICD-10-CM

## 2024-01-05 DIAGNOSIS — I50.23 ACUTE ON CHRONIC SYSTOLIC CONGESTIVE HEART FAILURE (H): Primary | ICD-10-CM

## 2024-01-05 LAB
ALBUMIN SERPL BCG-MCNC: 3.7 G/DL (ref 3.5–5.2)
ALP SERPL-CCNC: 82 U/L (ref 40–150)
ALT SERPL W P-5'-P-CCNC: 33 U/L (ref 0–70)
ANION GAP SERPL CALCULATED.3IONS-SCNC: 9 MMOL/L (ref 7–15)
AST SERPL W P-5'-P-CCNC: 37 U/L (ref 0–45)
BILIRUB SERPL-MCNC: 0.4 MG/DL
BUN SERPL-MCNC: 16.9 MG/DL (ref 6–20)
CALCIUM SERPL-MCNC: 8.7 MG/DL (ref 8.6–10)
CHLORIDE SERPL-SCNC: 101 MMOL/L (ref 98–107)
CREAT SERPL-MCNC: 1.03 MG/DL (ref 0.67–1.17)
DEPRECATED HCO3 PLAS-SCNC: 27 MMOL/L (ref 22–29)
EGFRCR SERPLBLD CKD-EPI 2021: 85 ML/MIN/1.73M2
ERYTHROCYTE [DISTWIDTH] IN BLOOD BY AUTOMATED COUNT: 16.5 % (ref 10–15)
GLUCOSE SERPL-MCNC: 132 MG/DL (ref 70–99)
HCT VFR BLD AUTO: 40.7 % (ref 40–53)
HGB BLD-MCNC: 12.4 G/DL (ref 13.3–17.7)
INR PPP: 1.59 (ref 0.85–1.15)
MCH RBC QN AUTO: 27.1 PG (ref 26.5–33)
MCHC RBC AUTO-ENTMCNC: 30.5 G/DL (ref 31.5–36.5)
MCV RBC AUTO: 89 FL (ref 78–100)
PLATELET # BLD AUTO: 222 10E3/UL (ref 150–450)
POTASSIUM SERPL-SCNC: 4.9 MMOL/L (ref 3.4–5.3)
PROT SERPL-MCNC: 6 G/DL (ref 6.4–8.3)
RBC # BLD AUTO: 4.57 10E6/UL (ref 4.4–5.9)
SODIUM SERPL-SCNC: 137 MMOL/L (ref 135–145)
WBC # BLD AUTO: 17.5 10E3/UL (ref 4–11)

## 2024-01-05 PROCEDURE — 250N000013 HC RX MED GY IP 250 OP 250 PS 637: Performed by: INTERNAL MEDICINE

## 2024-01-05 PROCEDURE — 93750 INTERROGATION VAD IN PERSON: CPT

## 2024-01-05 PROCEDURE — 85610 PROTHROMBIN TIME: CPT | Performed by: STUDENT IN AN ORGANIZED HEALTH CARE EDUCATION/TRAINING PROGRAM

## 2024-01-05 PROCEDURE — 80053 COMPREHEN METABOLIC PANEL: CPT

## 2024-01-05 PROCEDURE — 85027 COMPLETE CBC AUTOMATED: CPT

## 2024-01-05 PROCEDURE — 71046 X-RAY EXAM CHEST 2 VIEWS: CPT

## 2024-01-05 PROCEDURE — 93282 PRGRMG EVAL IMPLANTABLE DFB: CPT

## 2024-01-05 PROCEDURE — 250N000013 HC RX MED GY IP 250 OP 250 PS 637: Performed by: NURSE PRACTITIONER

## 2024-01-05 PROCEDURE — 99024 POSTOP FOLLOW-UP VISIT: CPT

## 2024-01-05 PROCEDURE — 93971 EXTREMITY STUDY: CPT | Mod: 26 | Performed by: STUDENT IN AN ORGANIZED HEALTH CARE EDUCATION/TRAINING PROGRAM

## 2024-01-05 PROCEDURE — 93282 PRGRMG EVAL IMPLANTABLE DFB: CPT | Mod: 26 | Performed by: INTERNAL MEDICINE

## 2024-01-05 PROCEDURE — 36415 COLL VENOUS BLD VENIPUNCTURE: CPT

## 2024-01-05 PROCEDURE — 99239 HOSP IP/OBS DSCHRG MGMT >30: CPT | Mod: GC | Performed by: INTERNAL MEDICINE

## 2024-01-05 PROCEDURE — 93971 EXTREMITY STUDY: CPT | Mod: RT

## 2024-01-05 PROCEDURE — 71046 X-RAY EXAM CHEST 2 VIEWS: CPT | Mod: 26 | Performed by: RADIOLOGY

## 2024-01-05 RX ORDER — WARFARIN SODIUM 5 MG/1
5 TABLET ORAL
Status: DISCONTINUED | OUTPATIENT
Start: 2024-01-05 | End: 2024-01-05 | Stop reason: HOSPADM

## 2024-01-05 RX ORDER — WARFARIN SODIUM 5 MG/1
5 TABLET ORAL DAILY
Qty: 30 TABLET | Refills: 3 | Status: SHIPPED | OUTPATIENT
Start: 2024-01-05 | End: 2024-05-04

## 2024-01-05 RX ORDER — WARFARIN SODIUM 5 MG/1
TABLET ORAL
Status: ON HOLD | COMMUNITY
End: 2024-01-05

## 2024-01-05 RX ORDER — EPLERENONE 25 MG/1
12.5 TABLET ORAL DAILY
Status: DISCONTINUED | OUTPATIENT
Start: 2024-01-05 | End: 2024-01-05 | Stop reason: HOSPADM

## 2024-01-05 RX ADMIN — EPLERENONE 12.5 MG: 25 TABLET, FILM COATED ORAL at 12:49

## 2024-01-05 RX ADMIN — OXYCODONE HYDROCHLORIDE AND ACETAMINOPHEN 1 TABLET: 5; 325 TABLET ORAL at 13:13

## 2024-01-05 RX ADMIN — AMIODARONE HYDROCHLORIDE 200 MG: 200 TABLET ORAL at 08:50

## 2024-01-05 RX ADMIN — EMPAGLIFLOZIN 10 MG: 10 TABLET, FILM COATED ORAL at 08:50

## 2024-01-05 RX ADMIN — OXYCODONE HYDROCHLORIDE AND ACETAMINOPHEN 1 TABLET: 5; 325 TABLET ORAL at 08:50

## 2024-01-05 RX ADMIN — PANTOPRAZOLE SODIUM 20 MG: 20 TABLET, DELAYED RELEASE ORAL at 08:50

## 2024-01-05 RX ADMIN — OXYCODONE HYDROCHLORIDE AND ACETAMINOPHEN 1 TABLET: 5; 325 TABLET ORAL at 03:16

## 2024-01-05 RX ADMIN — DIGOXIN 250 MCG: 125 TABLET ORAL at 08:50

## 2024-01-05 RX ADMIN — LISINOPRIL 5 MG: 5 TABLET ORAL at 08:50

## 2024-01-05 ASSESSMENT — ACTIVITIES OF DAILY LIVING (ADL)
ADLS_ACUITY_SCORE: 18

## 2024-01-05 NOTE — TELEPHONE ENCOUNTER
ANTICOAGULATION  MANAGEMENT: Discharge Review    Akshat Fragoso chart reviewed for anticoagulation continuity of care    Hospital Admission on 1/4-1/5 for pacemaker lead change on 1/4.    Discharge disposition: Home    Results:    Recent labs: (last 7 days)     01/03/24  0000 01/04/24  0953 01/04/24  2135 01/05/24  0540   INR 2.1 1.59* 1.51* 1.59*     Anticoagulation inpatient management:     more warfarin administered than maintenance regimen  - booster dose of 7.5 mg given 1/4    Anticoagulation discharge instructions:     Warfarin dosing: home regimen continued - Warfarin is not listed on discharge medication list. However, all notes say to continue warfarin with GR 2-3.    Bridging: No   INR goal change: No      Medication changes affecting anticoagulation: Yes: keflex x5 days    Additional factors affecting anticoagulation: No     PLAN     Recommend to check INR on 1/11/24    Spoke with wife, Landry    Anticoagulation Calendar updated    Maggy Grover RN

## 2024-01-05 NOTE — OP NOTE
OPERATIVE DATE: 1/4/2024    PRE-OPERATIVE DIAGNOSIS:  1) Broken pace maker lead  Patient Active Problem List   Diagnosis    Lumbar radiculopathy    CHF (congestive heart failure) (H)    Cardiomyopathy, nonischemic (H)    Personal history of tobacco use, presenting hazards to health    ICD (implantable cardioverter-defibrillator) in place- [employer], single chamber- NOT dependent    Chronic systolic heart failure (H)    JOHNSON (dyspnea on exertion)    Acute on chronic systolic congestive heart failure (H)    Hypertrophic nonobstructive cardiomyopathy (H)    Other ill-defined heart diseases    Erectile dysfunction, unspecified erectile dysfunction type    COPD, severe (H)    Ventricular tachycardia (H)    LVAD (left ventricular assist device) present (H)    Chronic systolic congestive heart failure (H)    Long term use of drug    Left ventricular assist device present (H)    Anticoagulated on Coumadin    Anemia, iron deficiency    Chronic intractable pain    Rib pain on left side    Chronic bilateral low back pain, unspecified whether sciatica present    AICD lead malfunction         POST-OPERATIVE DIAGNOSIS:  1) Broken pace maker lead  Patient Active Problem List   Diagnosis    Lumbar radiculopathy    CHF (congestive heart failure) (H)    Cardiomyopathy, nonischemic (H)    Personal history of tobacco use, presenting hazards to health    ICD (implantable cardioverter-defibrillator) in place- [employer], single chamber- NOT dependent    Chronic systolic heart failure (H)    JOHNSON (dyspnea on exertion)    Acute on chronic systolic congestive heart failure (H)    Hypertrophic nonobstructive cardiomyopathy (H)    Other ill-defined heart diseases    Erectile dysfunction, unspecified erectile dysfunction type    COPD, severe (H)    Ventricular tachycardia (H)    LVAD (left ventricular assist device) present (H)    Chronic systolic congestive heart failure (H)    Long term use of drug    Left ventricular assist  device present (H)    Anticoagulated on Coumadin    Anemia, iron deficiency    Chronic intractable pain    Rib pain on left side    Chronic bilateral low back pain, unspecified whether sciatica present    AICD lead malfunction         PROCEDURE:  1) Laser lead extraction standby    : Charles Montgomery MD    CO-SURGEON: Dakota Waller MD    ANESTHESIA: GETA    ESTIMATED BLOOD LOSS: 10cc    INDICATIONS:  Mr. Akshat Fragoso is a 58 year old year old male admitted with broken pacer lead.  We were asked to standby for laser lead extraction.  Risks and benefits of the operation were explained to the patient and their family including, but not limited to, bleeding, infection, stroke and even death.  They understood these risks and agreed to proceed electively.    OPERATIVE REPORT:  The patient was transferred to the operating room and positioned supine on the OR table.  General anesthesia was monitored by the anesthesia team. The patients chest abdomen and bilateral lower extremities were clipped, prepped and draped in sterile fashion.  A pre-procedure time-out was performed confirming the correct patient, correct site and correct procedure.    Lead extraction was performed by Dr. Montgomery.  Please refer to his dictation for details.  No operative intervention was needed after lead extraction.    All needle, sponge and instrument counts were correct at the end of the case.    Dakota Waller  Cardiothoracic Surgery  Pager: [phone]

## 2024-01-05 NOTE — ANESTHESIA POSTPROCEDURE EVALUATION
Patient: Akshat Fragoso    Procedure: Procedure(s):   extraction, cardiopulomary bypass on standy, transesophageal echocardiogram performed by anesthesia  Pacemaker or Implantable Cardioverter Defibrillator Lead Implant - One Lead       Anesthesia Type:  General    Note:  Disposition: Admission   Postop Pain Control: Uneventful            Sign Out: Well controlled pain   PONV: No   Neuro/Psych: Uneventful            Sign Out: Acceptable/Baseline neuro status   Airway/Respiratory: Uneventful            Sign Out: Acceptable/Baseline resp. status   CV/Hemodynamics: Uneventful            Sign Out: Acceptable CV status; No obvious hypovolemia; No obvious fluid overload   Other NRE: NONE   DID A NON-ROUTINE EVENT OCCUR? No           Last vitals:  Vitals Value Taken Time   BP 84/59 01/04/24 1815   Temp     Pulse 78 01/04/24 1825   Resp 22 01/04/24 1826   SpO2 99 % 01/04/24 1826   Vitals shown include unfiled device data.    Electronically Signed By: Fazal Gao MD  January 4, 2024  6:27 PM

## 2024-01-05 NOTE — ANESTHESIA CARE TRANSFER NOTE
Patient: Akshat Fragoso    Procedure: Procedure(s):   extraction, cardiopulomary bypass on standy, transesophageal echocardiogram performed by anesthesia  Pacemaker or Implantable Cardioverter Defibrillator Lead Implant - One Lead       Diagnosis: AICD lead malfunction, progressive micro perf  Diagnosis Additional Information: No value filed.    Anesthesia Type:   General     Note:    Oropharynx: oropharynx clear of all foreign objects and spontaneously breathing  Level of Consciousness: awake  Oxygen Supplementation: face mask    Independent Airway: airway patency satisfactory and stable  Dentition: dentition unchanged  Vital Signs Stable: post-procedure vital signs reviewed and stable  Report to RN Given: handoff report given  Patient transferred to: PACU    Handoff Report: Identifed the Patient, Identified the Reponsible Provider, Reviewed the pertinent medical history, Discussed the surgical course, Reviewed Intra-OP anesthesia mangement and issues during anesthesia, Set expectations for post-procedure period and Allowed opportunity for questions and acknowledgement of understanding      Vitals:  Vitals Value Taken Time   BP 94/69    Temp 36.6    Pulse 84 01/04/24 1802   Resp 20 01/04/24 1802   SpO2 99 % 01/04/24 1802   Vitals shown include unfiled device data.    Electronically Signed By: Charles Patrick Schlatter, APRN CRNA  January 4, 2024  6:03 PM

## 2024-01-05 NOTE — H&P
[facility]    Cardiology History and Physical - Cardiology         Date of Admission:  1/4/2024    Assessment & Plan: SL    Mr Akshat Fragoso is a 58 year old gentleman with a history of NICM c/b VT/VF arrest s/p HM3 LVAD implant 03/23/23, atrial fibrillation, COPD, HTN, CKD who is admitted for overnight observation after planned dysfunction pacemaker lead extraction and reimplantation with Cardiology Electrophysiology. The patient arrives hemodynamically stable, at baseline, and in no distress.    #Dysfunction Pacemaker Lead   #[employer] Single Lead ICD in situ  Recent decrease in sensing and gradual increase in pacing threshold prompting planned lead extraction and replacement by EP on 01/04/24. The procedure was uncomplicated and the patient will be monitored overnight.  - Bedrest 2 hours [id number]  - Regular diet  - Resume home meds as ordered  - CXR completed, device interrogation in AM    #Chronic Systolic Heart Failure 2/2   #NICM with cardiogenic shock s/p   #HM III LVAD implantation 3/23/23 LVAD  #Moderate TR s/p   #TV annuloplasty with 32 mm MC3 partial ring 3/23/2023  #Drug-Induced Coagulation Defect  Baseline ACC/AHA Stage D, NYHA Class III  History of NICM diagnosed in 1/2017 and underwent ICD for primary SCD prophylaxis in 4/2017. He had cardiac arrest in the setting of VT that was slower than his programmed VT threshold for intervention on 3/15/2023. Received CPR for 6 mins with ROSC and developed cardiogenic shock. During hospitalization, he underwent HM3 LVAD implantation on 3/23/2023.  - Fluid status euvolemic: home bumetanide 1 mg daily   - ACEi/ARB: continue lisinopril 5 mg daily  - BB deferred in s/o LVAD  - MRA: continue home eplerenone 12.5 mg daily  - SCD prophylaxis ICD (s/p lead replacement as above on 01/04/24)  - Anticoagulation: warfarin INR goal 2-3, today 1.95  LVAD interrogated, no recent alarms.     #Lactic Acidosis  Mild  "to 2.2 after procedure today. Resolved to 1.8 on recheck. Suspect mild dehydration in setting of NPO and long EP procedure.       #Hyperglycemia  Mild, at goal for inpatient. Trend PRN.     #Hyponatremia   Mild to 134, likely in setting of dehydration given NPO for procedure.   - Trend with PO intake     #Leukocytosis  Chronic and stable. Trend routine CBC.     This patient was discussed with Dr Kenzie Moreau, attending cardiologist, who agrees with the assessment and plan unless otherwise indicated.    Be Deshpande MD [facility], PGY-5  Fellow, Cardiovascular Disease         Clinically Significant Risk Factors Present on Admission               # Drug Induced Coagulation Defect: home medication list includes an anticoagulant medication  # Drug Induced Platelet Defect: home medication list includes an antiplatelet medication   # Hypertension: Home medication list includes antihypertensive(s)  # End stage heart failure: Ventricular assist device (VAD) present  # Circulatory Shock: required vasopressors within past 24 hours       # Overweight: Estimated body mass index is 29.55 kg/m  as calculated from the following:    Height as of this encounter: 1.707 m (5' 7.2\").    Weight as of this encounter: 86.1 kg (189 lb 13.1 oz).        # Pacemaker present     Disposition Plan   Expected discharge: Tomorrow, recommended to prior living arrangement once  post-proceudre observation and device interrogation completed .    Entered: Be Deshpande MD 01/04/2024, 8:44 PM     Be Deshpande MD    ______________________________________________________________________    Chief Complaint   S/p procedure     History is obtained from the patient    History of Present Illness   Mr Akshat Fragoso is a 58 year old gentleman with a history of NICM c/b VT/VF arrest s/p HM3 LVAD implant 03/23/23, atrial fibrillation, COPD, HTN, CKD who is admitted for overnight observation after planned dysfunction pacemaker lead extraction and reimplantation " with Cardiology Electrophysiology.     Mr Fragoso is feeling well this evening, very hungry since he has been NPO for a long time. No significant pain after the procedure, no lightheadedness or chest pains. No palpitations. No LVAD alarms.       Past Medical History    Past Medical History:   Diagnosis Date    Acute right lumbar radiculopathy 11/02/2015    Acute systolic heart failure (H) 01/10/2017    Cardiomyopathy (H) 01/10/2017    CKD (chronic kidney disease) stage 3, GFR 30-59 ml/min (H) 01/30/2020    JOHNSON (dyspnea on exertion)     ED (erectile dysfunction) 10/02/2019    Erectile dysfunction     ICD (implantable cardioverter-defibrillator) in place- [employer], single chamber- NOT dependent 10/09/2017    Personal history of smoking 01/04/2017    Pulmonary nodules 01/17/2017    CT 11/2018:  Bilateral pulmonary nodules measuring up to 4 mm. No new or enlarging pulmonary nodules.       Past Surgical History   Past Surgical History:   Procedure Laterality Date    CARDIAC SURGERY  2016    defibrillator placement    COLONOSCOPY N/A 3/22/2023    Procedure: Colonoscopy;  Surgeon: Khushboo Crespo MD;  Location:  GI    CV INTRA AORTIC BALLOON N/A 3/22/2023    Procedure: Intraprocedure Aortic Balloon Pump Insertion;  Surgeon: Danie Snyder MD;  Location:  HEART CARDIAC CATH LAB    CV RIGHT HEART CATH MEASUREMENTS RECORDED N/A 11/20/2019    Procedure: CV RIGHT HEART CATH;  Surgeon: Jeff Aguilar MD;  Location:  HEART CARDIAC CATH LAB    CV RIGHT HEART CATH MEASUREMENTS RECORDED N/A 3/22/2023    Procedure: Right Heart Catheterization;  Surgeon: Danie Snyder MD;  Location:  HEART CARDIAC CATH LAB    CV RIGHT HEART CATH MEASUREMENTS RECORDED N/A 8/7/2023    Procedure: Heart Cath Right Heart Cath;  Surgeon: Paras Pang MD;  Location:  HEART CARDIAC CATH LAB    INSERT VENTRICULAR ASSIST DEVICE LEFT (HEARTMATE II) N/A 3/23/2023    Procedure: MEDIAN STERNOTOMY,  TRANSESOPHAGEAL ECHOCARDIOGRAM PER ANESTHESIA, CARDIOPULMONARY BYPASS PUMP, INSERTION, LEFT VENTRICULAR ASSIST DEVICE (HEARTMATE IIl), TRICUSPID VALVE REPAIR WITH EDWRDS 32 MM MC3 TRICUSPID ANNULOPLASTY RING;  Surgeon: Elena Matias MD;  Location: [facility] OR    None         Prior to Admission Medications   Prior to Admission Medications   Prescriptions Last Dose Informant Patient Reported? Taking?   acetaminophen (TYLENOL) 325 MG tablet 1/3/2024  No Yes   Sig: Take 2 tablets (650 mg) by mouth every 4 hours as needed for other (For optimal non-opioid multimodal pain management to improve pain control.)   amiodarone (PACERONE) 200 MG tablet 2024 at 0800  No Yes   Sig: Take 1 tablet (200 mg) by mouth daily   amoxicillin (AMOXIL) 500 MG tablet More than a month  No Yes   Sig: Take 4 tablets (2,000 mg) by mouth as needed (Take one hour before dental procedure.)   aspirin (ASA) 81 MG chewable tablet 2024 at 0800  No Yes   Si tablet (81 mg) by Oral or NG Tube route daily   bumetanide (BUMEX) 1 MG tablet 2024  No No   Sig: TAKE 1 TABLET (1MG) BY MOUTH  AS NEEDED FOR SWELLING IN LOWER EXTREMITIES OR SHORTNESS OF BREATH. PLEASE LET YOUR COORDINATOR KNOW IF YOU TAKE A DOSE.   digoxin (LANOXIN) 250 MCG tablet 2024 at 0800  No Yes   Sig: Take 1 tablet (250 mcg) by mouth daily   empagliflozin (JARDIANCE) 10 MG TABS tablet 2023  No No   Sig: Take 1 tablet (10 mg) by mouth daily   eplerenone (INSPRA) 25 MG tablet 2024 at 0800  No Yes   Sig: Take 12.5mg (1/2 tab) daily   lisinopril (ZESTRIL) 5 MG tablet 2024 at 0800  No Yes   Sig: Take 1 tablet (5 mg) by mouth daily   pantoprazole (PROTONIX) 20 MG EC tablet 2024 at 0800  No Yes   Sig: Take 1 tablet (20 mg) by mouth every morning (before breakfast)   warfarin ANTICOAGULANT (COUMADIN) 5 MG tablet 2024  No No   Sig: Take 0.5 tablet (2.5 mg) to 1 tablet (5 mg) by mouth every day OR as directed by Anticoagulation Clinic.       Facility-Administered Medications: None        Review of Systems    Skin: negative for, rash, bruising  Eyes: negative for, visual blurring, double vision, eye pain  Ears/Nose/Throat: negative for, nasal congestion, hearing loss, sore throat  Respiratory: negative for shortness of breath, dyspnea on exertion, cough, or hemoptysis  Cardiovascular: negative for, palpitations, chest pain, paroxysmal nocturnal dyspnea, dyspnea on exertion, orthopnea, lower extremity edema, syncope or near-syncope, claudication and exercise intolerance  Gastrointestinal: negative for, nausea, vomiting, abdominal pain, constipation and diarrhea  Genitourinary: negative for, dysuria and frequency  Musculoskeletal: negative for, back pain and joint pain  Neurologic: negative for, headaches, syncope, seizures and local weakness  Psychiatric: negative for, anxiety, thoughts of self-harm and thoughts of hurting someone else  Endocrine: negative for, cold intolerance, heat intolerance and night sweats      Social History   I have reviewed this patient's social history and updated it with pertinent information if needed.  Social History     Tobacco Use    Smoking status: Former     Packs/day: 0.25     Years: 15.00     Additional pack years: 0.00     Total pack years: 3.75     Types: Cigarettes    Smokeless tobacco: Former     Quit date: 3/15/2023   Vaping Use    Vaping Use: Never used   Substance Use Topics    Alcohol use: No     Alcohol/week: 0.0 standard drinks of alcohol    Drug use: No         Family History   I have reviewed this patient's family history and updated it with pertinent information if needed.  Family History   Problem Relation Age of Onset    Parkinsonism Mother     Atrial fibrillation Father     Heart Failure Father     Prostate Cancer Father     Skin Cancer Father     Anorexia nervosa Daughter     Other - See Comments Granddaughter         premature birth         Allergies   Allergies   Allergen Reactions    Codeine       Other reaction(s): GI intolerance, Intolerance-Can't Take        Physical Exam   Vital Signs: Temp: 98.5  F (36.9  C) Temp src: Oral BP: 105/41 Pulse: 82   Resp: 22 SpO2: 95 % O2 Device: None (Room air) Oxygen Delivery: 2 LPM  Weight: 189 lbs 13.06 oz  Exam:  Constitutional: healthy, alert, and no distress  Head: Normocephalic. No masses, lesions, or abnormalities  ENT: EOMI, no scleral icterus or injection, external nose and ears are normal  Cardiovascular: LVAD hum, no murmur appreciated, no rub  Respiratory: Clear to auscultation bilaterally, normal work of breathing, no wheeze, no rales  Gastrointestinal: Abdomen soft, non-tender, non-distended. No masses.   : Deferred  Musculoskeletal: extremities normal with no gross deformities noted, normal muscle tone  Skin: no suspicious lesions or rashes  Neurologic: Alert and responsive, grossly non-focal, moves all extremities, sensation grossly intact.   Psychiatric: mentation appears normal and affect normal/bright        Medical Decision Making             Data   I personally reviewed all laboratory and imaging data from the last 24 hours in addition to all available cardiology data.

## 2024-01-05 NOTE — PROGRESS NOTES
VAD Coordinator in OR throughout duration of lead revision surgery. VAD parameters were monitored in collaboration with anesthesia. Report given to PACU RN upon leaving the OR.     VAD parameters at START of surgery:  Speed: 5100 rpm  Flow: 4.2 lpm  Power: 3.6 mari  PI (or flow waveform peak/trough for HW): 3.9  VAD parameters at END of surgery:  Speed: 5100 rpm  Flow: 4.3 lpm  Power: 3.5 mari  PI (or flow waveform peak/trough for HW): 3.2  Speed adjustments made during OR: none  Flow range: 3.9-4.6 lpm  PI (or flow waveform peak/trough for HW) range: 1.6-4.3  Factors noted to cause a significant variation in VAD parameters: MAP  Other significant events: none    Please page the VAD Coordinator on-call with any VAD related questions (* * * [phone], job code [phone] from an internal line).     Nathalia Roberson RN

## 2024-01-05 NOTE — CODE/RAPID RESPONSE
Rapid Response Team Note    Assessment   In assessment a rapid response was called on Akshat Fragoso due to  LA 2.2 .- > 1.8 on recheck.  This presentation is likely due to Recent surgery, PPM placement w/ hx of LVAD.     Plan   -  No further intervention as repeat LA 1.8  -  The Cardiology primary team was able to be reached and they are in agreement with the above plan.  -  Disposition: The patient will remain on the current unit. We will continue to monitor this patient closely.  -  Reassessment and plan follow-up will be performed by the primary team      Nataliia Ceja PA-C  [employer] RRT [facility] Job Code Contact #[phone]  [facility] Paging/Directory    Hospital Course   Brief Summary of events leading to rapid response:   RRT called for LA 2.2, though on recheck LA 1.8. Discussed with Cardiology and no intervention.     Admission Diagnosis:   AICD lead malfunction [T82.110A]    Physical Exam   Temp: 98.5  F (36.9  C) Temp  Min: 98.2  F (36.8  C)  Max: 98.8  F (37.1  C)  Resp: 22 Resp  Min: 10  Max: 38  SpO2: 95 % SpO2  Min: 95 %  Max: 99 %  Pulse: 82 Pulse  Min: 73  Max: 113    No data recorded  BP: 105/41 Systolic (24hrs), Av , Min:84 , Max:130   Diastolic (24hrs), Av, Min:0, Max:99     I/Os: I/O last 3 completed shifts:  In: 501 [I.V.:501]  Out: -      Exam:   General: in no acute distress  Mental Status: AAOx4.  Resp: Breathing non-labored on RA  CV: LVAD hum  Skin: No rash to exposed skin areas  GI: BS+, no rebound or guarding    Significant Results and Procedures   Lactic Acid:   Recent Labs   Lab Test 24  2024 23  0916 23  0350   LACT 2.2* 1.2 1.5     CBC:   Recent Labs   Lab Test 24  0953 23  0956 23  1218 23  0906   WBC 14.8* 11.8*  --  14.7*   HGB 14.2 14.7 14.1 14.5   HCT 46.9 47.5  --  47.6    254  --  290        Sepsis Evaluation   The patient is not known to have an infection.  NO EVIDENCE OF SEPSIS at this time.  Vital sign, physical exam,  and lab findings are due to Recent surgery .

## 2024-01-05 NOTE — PLAN OF CARE
Neuro: A&Ox4.   Cardiac: SR..LVAD numbers WDL . LVAD dressing changed.   Respiratory: Sating 98 on RA.  GI/: Adequate urine output. No bm this shift   Diet/appetite: Tolerating regular diet. Eating well.  Activity:  IND up to chair and in halls.  Pain: pt c/o 10/10 incisional pain, prn percocet given x 2  Skin: L & R groin sites, sutures removed. L ICD site CDI.   LDA's:  PIV removed.   Plan: Continue with POC. Notify primary team with changes.      DISCHARGE                         ----------------------------------------------------------------------------  Discharged to: Home  Via: private transportation  Accompanied by: Family  Discharge Instructions: *diet, *activity, medications, follow up appointments, when to call the MD, aftercare instructions.  Prescriptions: To be filled by pharmacy; medication list reviewed & sent with pt  Follow Up Appointments: arranged; information given  Belongings: All sent with pt  IV: d/c'd  Telemetry: d/c'd  Pt exhibits understanding of above discharge instructions; all questions answered.    Discharge Paperwork: Signed, copied, and sent home with patient.

## 2024-01-05 NOTE — PHARMACY-ADMISSION MEDICATION HISTORY
Pharmacist Admission Medication History    Admission medication history is complete. The information provided in this note is only as accurate as the sources available at the time of the update.    Information Source[facility]s): [facility] and RN med history on 1/4/23  via  chart review    Pertinent Information:   - Medication fill history matches with [employer]  - Updated warfarin dosing from most recent Anticoagulation clinic note  - Last doses determined by RN during admission interview  - No additional questions/clarifications upon review    Changes made to PTA medication list:  Changed:   Warfarin dosing per anticoag note     Allergies reviewed with patient and updates made in EHR: no    Medication History Completed By: Mathieu Lobato, Pharm.D., R.Ph., PGY1 Resident 1/5/2024 10:22 AM    PTA Med List   Medication Sig Last Dose    acetaminophen (TYLENOL) 325 MG tablet Take 2 tablets (650 mg) by mouth every 4 hours as needed for other (For optimal non-opioid multimodal pain management to improve pain control.) 1/3/2024    amiodarone (PACERONE) 200 MG tablet Take 1 tablet (200 mg) by mouth daily 1/4/2024 at 0800    amoxicillin (AMOXIL) 500 MG tablet Take 4 tablets (2,000 mg) by mouth as needed (Take one hour before dental procedure.) More than a month    aspirin (ASA) 81 MG chewable tablet 1 tablet (81 mg) by Oral or NG Tube route daily 1/4/2024 at 0800    bumetanide (BUMEX) 1 MG tablet TAKE 1 TABLET (1MG) BY MOUTH  AS NEEDED FOR SWELLING IN LOWER EXTREMITIES OR SHORTNESS OF BREATH. PLEASE LET YOUR COORDINATOR KNOW IF YOU TAKE A DOSE. Past Week at prn    cephALEXin (KEFLEX) 500 MG capsule Take 1 capsule (500 mg) by mouth 3 times daily for 5 days     digoxin (LANOXIN) 250 MCG tablet Take 1 tablet (250 mcg) by mouth daily 1/4/2024 at 0800    empagliflozin (JARDIANCE) 10 MG TABS tablet Take 1 tablet (10 mg) by mouth daily 12/31/23    eplerenone (INSPRA) 25 MG tablet Take 12.5mg (1/2 tab)  daily 1/4/2024 at 0800    lisinopril (ZESTRIL) 5 MG tablet Take 1 tablet (5 mg) by mouth daily 1/4/2024 at 0800    oxyCODONE-acetaminophen (PERCOCET) 5-325 MG tablet Take 1 tablet by mouth every 6 hours as needed for pain     pantoprazole (PROTONIX) 20 MG EC tablet Take 1 tablet (20 mg) by mouth every morning (before breakfast) 1/4/2024 at 0800    warfarin ANTICOAGULANT (COUMADIN) 5 MG tablet 5 mg Mon, Wed, Fri and 2.5 mg ROW 1/4/2024 at PM

## 2024-01-05 NOTE — PLAN OF CARE
"[id number]    Plan: Continue with POC. Notify primary team with changes.    NURSING PROGRESS NOTE  Patient Transfer In    Report received from jose rafael England RN on PACU (unit) at  1930  Patient deemed stable for transfer.  Patient and their support system( wife is waiting at his room) are aware of plans.   Patient transported to [facility] (room) on tele,  O2 via NC at 2L with RN.    Patient settled and oriented to room, telemetry placed on patient and initial vital signs completed.  Patient educated on \"call don't fall\", use of call light, etc.  Call light was placed within reach.    Skin assessment completed by two RNs as part of initial assessment, Writer and Devon RN.   Belongings: Backpack, cell phone given to patients wife.     Neuro: A&Ox4. Call light appropriate.   Cardiac: SR. VSS. ICD site dressing CDI.    Respiratory: Sating 96% on R.A.   GI/: Muro catheter in place.   Diet/appetite: Liquid diet and eventually changed to regular diet.   Activity: Compete bedrest until 8pm.   Pain: Complains pain on the ICD site 8/10 and went down to 4/10.   Skin: Left and Right groin site sutured CDI.Left ICD site covered with dressing no drainage noted, dressing CDI.   LDA's: Left wrist , left hand CDI saline locked.     0900 Sepsis triggered, lactic of 2.2. Paged code sepsis and Cards 2. Seen  by team. Cleared for bedrest, okay for regular diet and team okayed to page device team in the morning.   1000: Pulled out muro catheter without any issues.     Malaika Malik RN .................................................... January 4, 2024   9:57 PM  [facility] ([facility]): [facility] ICU (Unit 6D)     "

## 2024-01-05 NOTE — PROGRESS NOTES
Major Shift Events: Neuro intact, PRNs given for pain. VSS, RA, MAPs per doppler appropriate, LVAD appropriate. Incisions WNL. Urinated via urinal x2. RUE edema since procedure, team aware, US ordered for AM. Expresses desire to return home.  Plan: trend labs, treat pain, workup for discharge  For vital signs and complete assessments, please see documentation flowsheets.

## 2024-01-05 NOTE — ANESTHESIA PROCEDURE NOTES
Perioperative JOAN Procedure Note    Staff -        Anesthesiologist:  Kirk Zavala MD       Other Anesthesia Staff: Milagro Coley MD       Performed By: anesthesiologist  Preanesthesia Checklist:  Patient identified, IV assessed, risks and benefits discussed, monitors and equipment assessed, procedure being performed at surgeon's request and anesthesia consent obtained.    JOAN Probe Insertion  Probe Number: x7  Probe Status PRE Insertion: NO obvious damage  Probe type:  Adult 3D  Bite block used:   Soft  Insertion Technique: Easy, no oropharyngeal manipulation  Insertion complications: Lip Injury  Billing Report:JOAN report by Anesthesiologist (See Separate Report note)  Probe Status POST Removal: NO obvious damage    JOAN Report  General Procedure Information  Images for this study have been archived.    Post Intervention Findings  Procedure(s) performed:  Other (see comments) (laser lead extraction). Global function:  Unchanged. Regional wall motion: Unchanged. Surgeon(s) notified of all postintervention findings: Yes.                 Echocardiogram Comments  Echocardiogram comments: Limited echo performed for monitoring during laser lead extraction.  Small pericardial effusion around RV without change post lead removal.

## 2024-01-05 NOTE — DISCHARGE SUMMARY
[facility]    Discharge Summary - Cardiology    Date of Admission:  1/4/2024  Date of Discharge:  1/5/2024  Discharging Provider: Rob Melo MD PhD    Discharge Diagnoses   # Dysfunction Pacemaker Lead   # [employer] [employer] Single Lead ICD in situ (reason for admission)  # Chronic Systolic Heart Failure 2/2   # NICM with cardiogenic shock s/p   # HM III LVAD implantation 3/23/23 LVAD  # Moderate TR s/p   # TV annuloplasty with 32 mm MC3 partial ring 3/23/2023  # Drug-Induced Coagulation Defect  # Lactic Acidosis, resolved  # Hyperglycemia   # Hyponatremia   # Leukocytosis, chronic    Follow-ups Needed After Discharge   Follow-up Appointments     Follow Up ([facility])      Follow up with primary care provider, Darian Estevez, within 7 days s/p   pacemaker    Appointments on [place] and/or [facility] (with [facility] or [facility]   provider or service). Call [phone] if you haven't heard regarding   these appointments within 7 days of discharge.            Unresulted Labs Ordered in the Past 30 Days of this Admission       Date and Time Order Name Status Description    1/4/2024 10:55 AM Prepare red blood cells (unit) Preliminary     1/4/2024 10:55 AM Prepare red blood cells (unit) Preliminary           Discharge Disposition   Discharged to home  Condition at discharge: Stable    Hospital Course   # Dysfunction Pacemaker Lead s/p change 1/4/24  # [employer] [employer] Single Lead ICD in situ  Recent decrease in sensing and gradual increase in pacing threshold prompting planned lead extraction and replacement by EP on 01/04/24. The procedure was uncomplicated and the patient was monitored overnight. Device interrogation showed normal device function and stable lead parameters. Chest x-ray demonstrated no evidence of pneumothorax. Left subclavian site is CDI, no bleeding, and no hematoma. Groin sutures removed. Patient remains hemodynamically stable. Patient was tolerating diet  without nausea or vomiting with minor pain around the implant site.  - Resume home meds    # Chronic Systolic Heart Failure 2/2   # NICM with cardiogenic shock s/p   # HM III LVAD implantation 3/23/23 LVAD  # Moderate TR s/p   # TV annuloplasty with 32 mm MC3 partial ring 3/23/2023  # Drug-Induced Coagulation Defect  Baseline ACC/AHA Stage D, NYHA Class III  History of NICM diagnosed in 1/2017 and underwent ICD for primary SCD prophylaxis in 4/2017. He had cardiac arrest in the setting of VT that was slower than his programmed VT threshold for intervention on 3/15/2023. Received CPR for 6 mins with ROSC and developed cardiogenic shock. During hospitalization, he underwent HM3 LVAD implantation on 3/23/2023  - Fluid status euvolemic: home bumetanide 1 mg daily   - ACEi/ARB: continue lisinopril 5 mg daily  - BB deferred in s/o LVAD  - MRA: continue home eplerenone 12.5 mg daily  - SCD prophylaxis ICD (s/p lead replacement as above on 01/04/24)  - Anticoagulation: warfarin INR goal 2-3, today 1.95  - Patient received dose prior to discharge and contacted to continue taking his home warfarin dosing as prior to admission  LVAD interrogated, no recent alarms.     # Lactic Acidosis, resolved  Mild to 2.2 after procedure today. Resolved to 1.8 on recheck. Suspect mild dehydration in setting of NPO and long EP procedure.     # Hyperglycemia  Mild 132 at goal for inpatient.      # Hyponatremia, resolved  Mild to 134, likely in setting of dehydration given NPO for procedure.   - Trend with PO intake      # Leukocytosis  Chronic and stable. Trend routine CBC.     Principal Problem:    AICD lead malfunction      Consultations This Hospital Stay   PHARMACY TO DOSE WARFARIN    Code Status   Prior      Rob Melo MD PhD  [facility]  ______________________________________________________________________    Physical Exam   Temp: 98.2  F (36.8  C) Temp src: Oral BP: 106/89 Pulse: 102    Resp: 29 SpO2: 96 % O2 Device: None (Room air)    Vitals:    01/04/24 0932   Weight: 86.1 kg (189 lb 13.1 oz)     Vital Signs with Ranges  Temp:  [98.2  F (36.8  C)-98.3  F (36.8  C)] 98.2  F (36.8  C)  Pulse:  [] 102  Resp:  [16-29] 29  BP: (106)/(89) 106/89  SpO2:  [96 %-98 %] 96 %  I/O last 3 completed shifts:  In: 1350 [P.O.:350; I.V.:1000]  Out: 790 [Urine:790]    Constitutional: healthy, alert, and no distress  Head: Normocephalic. No masses, lesions, or abnormalities  ENT: EOMI, no scleral icterus or injection, external nose and ears are normal  Cardiovascular: LVAD hum, no murmur appreciated, no rub  Respiratory: LVAD Hum, incision site is clean and intact without hematomas  Gastrointestinal: Abdomen soft, non-tender, non-distended. No masses.   : Deferred  Musculoskeletal: extremities normal with no gross deformities noted, normal muscle tone  Skin: no suspicious lesions or rashes  Neurologic: Alert and responsive, grossly non-focal, moves all extremities, sensation grossly intact.   Psychiatric: mentation appears normal and affect normal/bright    Primary Care Physician   Darian Estevez    Discharge Orders      Reason for your hospital stay    ICD exchange     Activity    Your activity upon discharge: activity as tolerated     Follow Up ([facility])    Follow up with primary care provider, Darian Estevez, within 7 days s/p pacemaker    Appointments on [place] and/or [facility] (with [facility] or [facility] provider or service). Call [phone] if you haven't heard regarding these appointments within 7 days of discharge.     Diet    Follow this diet upon discharge: Cardiac       Significant Results and Procedures   Results for orders placed or performed during the hospital encounter of 01/04/24   X-ray Chest 1 vw port    Narrative    Exam: Chest radiograph 1/4/2024 7:26 PM    History: s/p ICD    Comparison: 10/27/2023, 12/27/2023.     Findings: Portable AP view of the chest. Left chest wall cardiac  device  lead tip projects over the expected location of the right  ventricle. Median sternotomy wires are intact without lateral  displacement. LVAD device in stable position with intact drive line.  Trachea is midline and the cardiac silhouette is stable. No  pneumothorax or focal lobar consolidation. Trace pleural effusions are  present. No acute osseous abnormality.      Impression    Impression: Left chest wall implantable cardiac defibrillator, lead  tip in the right ventricle. Trace pleural effusions.    I have personally reviewed the examination and initial interpretation  and I agree with the findings.    CAITLIN RAO MD         SYSTEM ID:  [id number]   X-ray Chest 2 vws*    Narrative    Exam: XR CHEST 2 VIEWS, 1/5/2024 8:30 AM    Indication: s/p ICD    Comparison: 1/4/2024, 12/27/2023    Findings:   PA and lateral views of the chest. Left chest wall implantable cardiac  defibrillator with lead in place. LVAD. Intact midline sternotomy  wires. Trachea is midline. No pneumothorax or pleural effusion. No  focal pulmonary consolidations. Streaky bibasilar pulmonary opacities.  Stable cardiomediastinal silhouette. No acute osseous abnormalities.      Impression    Impression:   1. Stable post surgical changes of the chest.  2. No acute pulmonary opacities.    I have personally reviewed the examination and initial interpretation  and I agree with the findings.    CAITLIN RAO MD         SYSTEM ID:  [id number]   US Upper Extremity Venous Duplex Right    Narrative    EXAMINATION: DOPPLER VENOUS ULTRASOUND OF THE RIGHT UPPER EXTREMITY,  1/5/2024 9:24 AM     COMPARISON: None.    HISTORY:  Right arm swelling    TECHNIQUE:  Gray-scale evaluation with compression, spectral flow and  color Doppler assessment of the deep venous system of the right upper  extremity.    FINDINGS:    Right: Normal blood flow and waveforms are demonstrated in the  internal jugular, innominate, subclavian, and axillary veins. There is  normal  compressibility of the brachial, basilic and cephalic veins.      Impression    IMPRESSION:  No evidence of right upper extremity deep venous  thrombosis.    I have personally reviewed the examination and initial interpretation  and I agree with the findings.    JAQUELINE FISHER MD         SYSTEM ID:  [id number]   EP Study    Narrative    Images from the original result were not included.  EP PROCEDURE NOTE     PREOPERATIVE DIAGNOSIS  RV ICD lead dislodgment s/p LVAD surgery      POSTOPERATIVE DIAGNOSIS  As above     NAME OF PROCEDURE:  1. Manual extraction of RV ICD lead  2. Arterial (and venous) access for possible cardiopulmonary bypass  3. Intracardiac echocardiography  4. Single chamber ICD implantation   5. Left subclavian venogram      PROCEDURE PERFORMED IN:  CV Lab, room 5     EP STAFF:  Dr. Montgomery  EP FELLOW:  Dr. Campbell, Dr. Horne  CV SURGERY STAFF:  Dr. Waller     COMPLICATIONS:  None apparent  ESTIMATED BLOOD LOSS: 10 cc  TOTAL FLUOROSCOPY TIME:  See log.      CLINICAL PROFILE:  Akshat Fragoso is a 58 year old man with a PMHx of NICM (LVEF 12%) s/p   single chamber ICD '17 and DT LVAD along with TV annuloplasty with 32 mm   MC3 partial ring and PFO closure 3/23/2023, VT (in March '23 prior to LVAD   implant), HTN, COPD, CKD III, and chronic tobacco/marijuana use who   presents for RV ICD lead extraction. After LVAD, RV sensing and impedence   has dropped significantly with increase in threshold. Plan is extraction   and reimplantation.  0%.           PROCEDURE:  Patient was brought to the operating room.  Informed consent had been   obtained prior to the procedure.  Risks discussed included bleeding,   infection, vascular tear, cardiac perforation, complete heart block (if   not already present),  emergency thoracotomy, emergency sternotomy, and   death.  The patient was prepped and draped in the usual, sterile manner.    General anesthesia was administered by the Anesthesia Service.     Using modified  Seldinger technique, the following catheters were placed:    A 4 Fr sidearm sheath in the left femoral artery and a 4 Fr sidearm sheath   in the left femoral vein (placed for arterial BP recording and for access   in case of a need for emergent cardiopulmonary bypass), a 6 [place] sheath   for possible snaring, and12 Fr sidearm sheath in the right femoral vein   for intracardiac ultrasound (and for an emergency occlusion balloon if   necessary).      Under fluoroscopic guidance a  [employer] Intracardiac Echo (ICE)   probe was advanced from the right femoral vein to the high right atrium   and SVC.  The lead course was examined with ICE and showed two discrete   adhesions I the SVC.     Under fluoroscopic guidance an Amplatz Superstiff guidewire was advanced   from the right femoral vein to the right internal jugular vein to provide   delivery of an emergency balloon occlusion catheter.     The device to be explanted was in a left pectoral position.  The pocket   was opened through the previous surgical scar.  The subcutaneous tissue   was dissected until the generator was identified.  The generator was   dissected free of the pocket. The lead was dissected free of scar tissue   and the suture sleeve was identified.  The generator was detached from the   lead.  We attempted to retract the helix from the lead however the helix   would not retract.  A left subclavian venogram was obtained showing patent   venous anatomy.  Using a micropuncture kit, access was obtained into the   left subclavian vein and a long wire was advanced down into the IVC.     The lead end was cut and the leads were prepared in the usual manner.     A SlideBatch lead locking device was advanced however would not pass   beyond a point in the mid SVC. A liberator stylet was used however this as   well would not advance any further as such we removed the liberator and   inserted the lead locking stylet and the locking mechanism was  deployed.    The lead insulation was secured with fiber wire. Dr. Waller was present   in CV lab 5 throughout the extraction process to provide emergency   surgical back-up.  An 11 [place] tight rail was advanced over the lead   including the locking stylette and FiberWire.  Gentle traction was held on   the lead as the tight rail was advanced under fluoroscopic guidance.    During this process JOAN was used frequently to assess for pericardial   effusion.  The tight rail advanced all the way down to the tip of the lead   and the lead tip freed up and was removed with no issues.  JOAN performed   after lead removal showed no pericardial effusion.  The patient remained   hemodynamically stable throughout the entire extraction process.  JOAN was   repeated several minutes after lead removal and again showed no   pericardial effusion.    A long 9 [place] sheath was then inserted over the previously placed long   wire in the left subclavian vein.  The dilator and wire were removed.  A   right ventricular lead was inserted through the sheath down to the RV   apical septum and mapping for best sensing was performed.  R waves were   low, 1-2 mV around the apical septum and as such we position the lead   higher on the septum and achieved better sensing.  The lead was screwed   into the myocardium. There was very good sensing and pacing threshold at   this position. Ten volt Pacing was performed without diaphragmatic   stimulation. The sheath was then peeled away, and the sleeve adapter was   advanced over the lead. The lead was then secured to the muscle using   0-Ethibond suture.      The pocket was then vigorously washed with antibiotic solution. The right   ventricular lead was inserted into the pulse generator. The pulse   generator and the lead was inserted into the subcutaneous pocket and   secured with a 0-Ethibond suture.    The pocket was then closed in 3 layers using 2-0 Vicryl for the deep   layers and 4-0 Vicryl for  the subcuticular layer. Steri-Strips and a   dressing were then applied over the incision site, and the patient was   transported to a monitored bed.    The Amplatz Super Stiff wire in the right femoral vein was removed under   fluoroscopic guidance with no interaction with the newly implanted RV   lead.  The femoral sheathes were removed after figure-of-eight performed   and hemostasis was secured.      The patient tolerated the procedure well and there were no apparent   complications.  Patient left the Cath Lab room 5 in satisfactory condition   and was taken to the PACU.     Equipment, measurements, final programming:      Plan:  Short stay, will remain in hospital over night and EP team will see in the   morning for discharge.   CXR and device check.   Antibiotics per protocol.  4.    Routine post device implant follow up         Rob Campbell MD   EP Fellow, PGY-8  [phone]      Cardiac Device Check - Inpatient     Value    Date Time Interrogation Session [id number]    Implantable Pulse Generator  [employer]    Implantable Pulse Generator Model D150 DYNAGEN    Implantable Pulse Generator Serial Number [id number]    Type Interrogation Session In Clinic    Clinic Name [facility]    Implantable Pulse Generator Type Defibrillator    Implantable Pulse Generator Implant Date 20170608    Implantable Lead  [employer]    Implantable Lead Model 0273 [place] 4-Site S    Implantable Lead Serial Number [id number]    Implantable Lead Implant Date 20170608    Implantable Lead Polarity Type Bipolar Lead    Implantable Lead Location Unknown    Implantable Lead Connection Status Connected    Jesus Setting Mode (NBG Code) VVI    Jesus Setting Lower Rate Limit 40    Lead Channel Setting Sensing Polarity Bipolar    Lead Channel Setting Sensing Sensitivity 0.6    Lead Channel Setting Sensing Adaptation Mode Adaptive    Lead Channel Setting Pacing Polarity  Bipolar    Lead Channel Setting Pacing Pulse Width 0.4    Lead Channel Setting Pacing Amplitude 3.5    Zone Setting Type Category VF    Zone Setting Vendor Type Category VF    Zone Setting Status Active    Zone Setting Detection Interval 300    Zone Setting Type Category VT    Zone Setting Vendor Type Category VT    Zone Setting Status Active    Zone Setting Detection Interval 353    Lead Channel Impedance Value 446    Lead Channel Pacing Threshold Amplitude 0.6    Lead Channel Pacing Threshold Pulse Width 0.4    Battery Date Time of Measurements 20240105084100    Battery Status Beginning of Service    Battery Remaining Longevity 114    Battery Remaining Percentage 100    Capacitor Charge Type Reformation    Capacitor Last Charge Date Time [id number]    Capacitor Charge Time 10.5    Jesus Statistic Date Time Start 20240104000000    Jesus Statistic Date Time End 20240105000000    Jesus Statistic RV Percent Paced 1    Therapy Statistic Recent Shocks Delivered 0    Therapy Statistic Recent Shocks Aborted 0    Therapy Statistic Recent ATP Delivered 0    Therapy Statistic Recent Date Time Start 20240104000000    Therapy Statistic Recent Date Time End 20240105000000    Therapy Statistic Total Shocks Delivered 0    Therapy Statistic Total Shocks Aborted 0    Therapy Statistic Total ATP Delivered 1    Therapy Statistic Total  Date Time Start 20170608000000    Therapy Statistic Total  Date Time End 20240105000000    Episode Statistic Recent Count 0    Episode Statistic Type Category Other    Episode Statistic Recent Count 0    Episode Statistic Type Category VT    Episode Statistic Vendor Type Category NSVT    Episode Statistic Recent Count 0    Episode Statistic Type Category VF    Episode Statistic Vendor Type Category VF    Episode Statistic Recent Count 0    Episode Statistic Type Category VT    Episode Statistic Vendor Type Category VT    Episode Statistic Recent Count 0    Episode Statistic Type Category VT     Episode Statistic Vendor Type Category VT-1    Episode Statistic Recent Date Time Start 20240104000000    Episode Statistic Recent Date Time End 20240105000000    Episode Statistic Recent Date Time Start 20240104000000    Episode Statistic Recent Date Time End 20240105000000    Episode Statistic Recent Date Time Start 20240104000000    Episode Statistic Recent Date Time End 20240105000000    Episode Statistic Recent Date Time Start 20240104000000    Episode Statistic Recent Date Time End 20240105000000    Episode Statistic Recent Date Time Start 20240104000000    Episode Statistic Recent Date Time End 20240105000000    Narrative    Patient seen on 6C for evaluation and iterative programming of their ICD per MD orders. Patient is s/p manual extraction of RV ICD lead and implant of new RV ICD lead. ICD discharge teaching provided in written and verbal form. Patient verbalized understanding of instructions.    Device: rimidi D150 DYNAGEN  Normal device function  Mode: VVI 40 bpm  : <1%  Intrinsic rhythm: VS @ 86 bpm  Lead Trends Appear Stable: Yes  Estimated battery longevity to RRT = 9.5 years  Ventricular Arrhythmia:   Setting Changes: None  Plan: Device and incision checks are scheduled for 1/15/24 @ 1030.  C Murray, RN    Single lead ICD    I have reviewed and interpreted the device interrogation, settings, programming and nurse's summary. The device is functioning within normal device parameters. I agree with the current findings, assessment and plan.     *Note: Due to a large number of results and/or encounters for the requested time period, some results have not been displayed. A complete set of results can be found in Results Review.        Review of your medicines        START taking        Dose / Directions   cephALEXin 500 MG capsule  Commonly known as: KEFLEX  Used for: S/P ICD (internal cardiac defibrillator) procedure      Dose: 500 mg  Take 1 capsule (500 mg) by mouth 3 times daily for 5  days  Quantity: 15 capsule  Refills: 0     oxyCODONE-acetaminophen 5-325 MG tablet  Commonly known as: PERCOCET  Used for: S/P ICD (internal cardiac defibrillator) procedure      Dose: 1 tablet  Take 1 tablet by mouth every 6 hours as needed for pain  Quantity: 18 tablet  Refills: 0            CONTINUE these medicines which may have CHANGED, or have new prescriptions. If we are uncertain of the size of tablets/capsules you have at home, strength may be listed as something that might have changed.        Dose / Directions   warfarin ANTICOAGULANT 5 MG tablet  Commonly known as: COUMADIN  This may have changed:   how much to take  how to take this  when to take this  additional instructions  Used for: LVAD (left ventricular assist device) present (H)      Dose: 5 mg  Take as directed. If you are unsure how to take this medication, talk to your nurse or doctor.  Original instructions: Take 1 tablet (5 mg) by mouth daily for 120 days Take 5 mg MWF and 2.5 rest of the week  Quantity: 30 tablet  Refills: 3            CONTINUE these medicines which have NOT CHANGED        Dose / Directions   acetaminophen 325 MG tablet  Commonly known as: TYLENOL  Used for: LVAD (left ventricular assist device) present (H)      Dose: 650 mg  Take 2 tablets (650 mg) by mouth every 4 hours as needed for other (For optimal non-opioid multimodal pain management to improve pain control.)  Quantity: 100 tablet  Refills: 0     amiodarone 200 MG tablet  Commonly known as: PACERONE  Used for: LVAD (left ventricular assist device) present (H)      Dose: 200 mg  Take 1 tablet (200 mg) by mouth daily  Quantity: 30 tablet  Refills: 11     amoxicillin 500 MG tablet  Commonly known as: AMOXIL  Used for: Chronic systolic congestive heart failure (H), LVAD (left ventricular assist device) present (H)      Dose: 2,000 mg  Take 4 tablets (2,000 mg) by mouth as needed (Take one hour before dental procedure.)  Quantity: 4 tablet  Refills: 0     aspirin 81 MG  chewable tablet  Commonly known as: ASA  Used for: LVAD (left ventricular assist device) present (H)      Dose: 81 mg  1 tablet (81 mg) by Oral or NG Tube route daily  Quantity: 90 tablet  Refills: 3     bumetanide 1 MG tablet  Commonly known as: BUMEX  Used for: LVAD (left ventricular assist device) present (H)      TAKE 1 TABLET (1MG) BY MOUTH  AS NEEDED FOR SWELLING IN LOWER EXTREMITIES OR SHORTNESS OF BREATH. PLEASE LET YOUR COORDINATOR KNOW IF YOU TAKE A DOSE.  Quantity: 30 tablet  Refills: 0     digoxin 250 MCG tablet  Commonly known as: LANOXIN  Used for: LVAD (left ventricular assist device) present (H)      Dose: 250 mcg  Take 1 tablet (250 mcg) by mouth daily  Quantity: 30 tablet  Refills: 11     empagliflozin 10 MG Tabs tablet  Commonly known as: JARDIANCE  Used for: LVAD (left ventricular assist device) present (H)      Dose: 10 mg  Take 1 tablet (10 mg) by mouth daily  Quantity: 90 tablet  Refills: 3     eplerenone 25 MG tablet  Commonly known as: Inspra  Used for: Left ventricular assist device present (H), Chronic systolic congestive heart failure (H)      Take 12.5mg (1/2 tab) daily  Quantity: 45 tablet  Refills: 3     lisinopril 5 MG tablet  Commonly known as: ZESTRIL  Used for: Chronic systolic (congestive) heart failure (H), Left ventricular assist device present (H)      Dose: 5 mg  Take 1 tablet (5 mg) by mouth daily  Quantity: 90 tablet  Refills: 3     pantoprazole 20 MG EC tablet  Commonly known as: PROTONIX  Used for: LVAD (left ventricular assist device) present (H)      Dose: 20 mg  Take 1 tablet (20 mg) by mouth every morning (before breakfast)  Quantity: 90 tablet  Refills: 3               Where to get your medicines        These medications were sent to [facility] - [place], [place] - [address]  [address] [address]      Phone: [phone]   cephALEXin 500 MG capsule  oxyCODONE-acetaminophen 5-325 MG tablet  warfarin ANTICOAGULANT 5 MG  tablet           Allergies   Allergies   Allergen Reactions    Codeine      Other reaction(s): GI intolerance, Intolerance-Can't Take

## 2024-01-05 NOTE — H&P
"    Electrophysiology Discharge Summary  Date of Procedure: 1/4/24  DOS: 1/5/2024   Pre-procedure Diagnosis: RV ICD lead dislodgment s/p LVAD surgery    Post-procedure Diagnosis: Extraction of RV lead, re-implant of single chamber ICD    Hospital Course:  Mr. Fragoso is a 58 year old medical history significant for NICM LVEF 12%, VT/VF arrest on 3/15/2023, s/p DT LVAD 3/23/23, moderate TV annuloplasty  with 32 mm MC3 partial ring and PFO closure 3/23/2023, s/p ICD 6/8/17, HTN, COPD, CKD III, and chronic tobacco/marijuana use. He has now had recent decrease in pacing impedance after LVAD implant with substantial decrease in sensing and gradual increase in pacing threshold. We discussed lead is showing signs of progressive microperforation. We discussed we would recommend extraction and reimplant. We had a long discussion with the patient about lead extraction, he was agreeable to proceeding.    Patient underwent a successful extraction of RV lead with re-implant of single chamber ICD. He had an uneventful overnight stay, lactic checked overnight and increased to 2.2, recheck 1.8, suspect in setting of procedure yesterday and being NPO. Telemetry reviewed showing sinus rhythm. Device interrogation shows normal device function and stable lead parameters. Chest x-ray demonstrates no evidence of pneumothorax. Left subclavian site is CDI, no bleeding, and no hematoma. Groin sutures removed. Patient is tolerating oral intake, ambulating at baseline, and voiding without difficulties. Patient remains hemodynamically stable.     ROS:   10 point ROS neg other than the symptoms noted above.   Exam:  /41   Pulse 83   Temp 98.3  F (36.8  C) (Oral)   Resp 21   Ht 1.707 m (5' 7.2\")   Wt 86.1 kg (189 lb 13.1 oz)   SpO2 98%   BMI 29.55 kg/m      Constitutional: healthy, alert, and no distress  Cardiovascular: Vad hum, sinus rhythm  Respiratory: CTA, respirations unlabored   Gastrointestinal: soft, nontender, nondistended "   Musculoskeletal: extremities normal- no gross deformities noted  Skin: no suspicious lesions or rashes. Incision site CDI, no hematoma. Groin sutures removed.   Neurologic: Gait normal. Sensation grossly WNL.  Psychiatric: mentation appears normal    Discharge Medications:     Review of your medicines        START taking        Dose / Directions   cephALEXin 500 MG capsule  Commonly known as: KEFLEX  Used for: S/P ICD (internal cardiac defibrillator) procedure      Dose: 500 mg  Take 1 capsule (500 mg) by mouth 3 times daily for 5 days  Quantity: 15 capsule  Refills: 0     oxyCODONE-acetaminophen 5-325 MG tablet  Commonly known as: PERCOCET  Used for: S/P ICD (internal cardiac defibrillator) procedure      Dose: 1 tablet  Take 1 tablet by mouth every 6 hours as needed for pain  Quantity: 18 tablet  Refills: 0            CONTINUE these medicines which have NOT CHANGED        Dose / Directions   acetaminophen 325 MG tablet  Commonly known as: TYLENOL  Used for: LVAD (left ventricular assist device) present (H)      Dose: 650 mg  Take 2 tablets (650 mg) by mouth every 4 hours as needed for other (For optimal non-opioid multimodal pain management to improve pain control.)  Quantity: 100 tablet  Refills: 0     amiodarone 200 MG tablet  Commonly known as: PACERONE  Used for: LVAD (left ventricular assist device) present (H)      Dose: 200 mg  Take 1 tablet (200 mg) by mouth daily  Quantity: 30 tablet  Refills: 11     amoxicillin 500 MG tablet  Commonly known as: AMOXIL  Used for: Chronic systolic congestive heart failure (H), LVAD (left ventricular assist device) present (H)      Dose: 2,000 mg  Take 4 tablets (2,000 mg) by mouth as needed (Take one hour before dental procedure.)  Quantity: 4 tablet  Refills: 0     aspirin 81 MG chewable tablet  Commonly known as: ASA  Used for: LVAD (left ventricular assist device) present (H)      Dose: 81 mg  1 tablet (81 mg) by Oral or NG Tube route daily  Quantity: 90  tablet  Refills: 3     bumetanide 1 MG tablet  Commonly known as: BUMEX  Used for: LVAD (left ventricular assist device) present (H)      TAKE 1 TABLET (1MG) BY MOUTH  AS NEEDED FOR SWELLING IN LOWER EXTREMITIES OR SHORTNESS OF BREATH. PLEASE LET YOUR COORDINATOR KNOW IF YOU TAKE A DOSE.  Quantity: 30 tablet  Refills: 0     digoxin 250 MCG tablet  Commonly known as: LANOXIN  Used for: LVAD (left ventricular assist device) present (H)      Dose: 250 mcg  Take 1 tablet (250 mcg) by mouth daily  Quantity: 30 tablet  Refills: 11     empagliflozin 10 MG Tabs tablet  Commonly known as: JARDIANCE  Used for: LVAD (left ventricular assist device) present (H)      Dose: 10 mg  Take 1 tablet (10 mg) by mouth daily  Quantity: 90 tablet  Refills: 3     eplerenone 25 MG tablet  Commonly known as: Inspra  Used for: Left ventricular assist device present (H), Chronic systolic congestive heart failure (H)      Take 12.5mg (1/2 tab) daily  Quantity: 45 tablet  Refills: 3     lisinopril 5 MG tablet  Commonly known as: ZESTRIL  Used for: Chronic systolic (congestive) heart failure (H), Left ventricular assist device present (H)      Dose: 5 mg  Take 1 tablet (5 mg) by mouth daily  Quantity: 90 tablet  Refills: 3     pantoprazole 20 MG EC tablet  Commonly known as: PROTONIX  Used for: LVAD (left ventricular assist device) present (H)      Dose: 20 mg  Take 1 tablet (20 mg) by mouth every morning (before breakfast)  Quantity: 90 tablet  Refills: 3     warfarin ANTICOAGULANT 5 MG tablet  Commonly known as: COUMADIN  Used for: LVAD (left ventricular assist device) present (H)      Take as directed. If you are unsure how to take this medication, talk to your nurse or doctor.  Original instructions: Take 0.5 tablet (2.5 mg) to 1 tablet (5 mg) by mouth every day OR as directed by Anticoagulation Clinic.  Quantity: 90 tablet  Refills: 1               Where to get your medicines        These medications were sent to [facility]  Discharge - [place], [place] - [address]  [address] [address]      Phone: [phone]   cephALEXin 500 MG capsule  oxyCODONE-acetaminophen 5-325 MG tablet       Plan & Follow up:  Follow up with device clinic in 7-10 days  Follow up with Dr Montgomery as scheduled on 4/10/24  Oral antibiotics x 5 days  Keep incision clean and dry for 72 hours post procedure  No lifting > 10lbs or over shoulder level with left arm for 4 weeks  Notify device clinic/EP immediately for any redness, swelling, bleeding, discharge, fevers or chills.      Kay Shearer PA-C  [facility]  Electrophysiology Consult Service  Pager: [phone]

## 2024-01-05 NOTE — PHARMACY-ANTICOAGULATION SERVICE
Clinical Pharmacy - Warfarin Dosing Consult     Pharmacy has been consulted to manage this patient s warfarin therapy.  Indication: LVAD/RVAD  Therapy Goal: INR 2-3  OP Anticoag Clinic: [facility]  Warfarin Prior to Admission: Yes  Warfarin PTA Regimen: 5 mg MWF and 2.5 mg ROW  Significant drug interactions: amiodarone    INR   Date Value Ref Range Status   01/04/2024 1.59 (H) 0.85 - 1.15 Final     INR HOME MONITORING   Date Value Ref Range Status   01/03/2024 2.1 2.000 - 3.000 Final       Recommend warfarin 7.5 mg today.  Pharmacy will monitor Akshat Fragoso daily and order warfarin doses to achieve specified goal.      Please contact pharmacy as soon as possible if the warfarin needs to be held for a procedure or if the warfarin goals change.

## 2024-01-06 LAB
ATRIAL RATE - MUSE: NORMAL BPM
ATRIAL RATE - MUSE: NORMAL BPM
DIASTOLIC BLOOD PRESSURE - MUSE: NORMAL MMHG
DIASTOLIC BLOOD PRESSURE - MUSE: NORMAL MMHG
INTERPRETATION ECG - MUSE: NORMAL
INTERPRETATION ECG - MUSE: NORMAL
P AXIS - MUSE: NORMAL DEGREES
P AXIS - MUSE: NORMAL DEGREES
PR INTERVAL - MUSE: NORMAL MS
PR INTERVAL - MUSE: NORMAL MS
QRS DURATION - MUSE: 136 MS
QRS DURATION - MUSE: 142 MS
QT - MUSE: 412 MS
QT - MUSE: 416 MS
QTC - MUSE: 464 MS
QTC - MUSE: 484 MS
R AXIS - MUSE: -80 DEGREES
R AXIS - MUSE: 259 DEGREES
SYSTOLIC BLOOD PRESSURE - MUSE: NORMAL MMHG
SYSTOLIC BLOOD PRESSURE - MUSE: NORMAL MMHG
T AXIS - MUSE: 43 DEGREES
T AXIS - MUSE: 83 DEGREES
VENTRICULAR RATE- MUSE: 75 BPM
VENTRICULAR RATE- MUSE: 83 BPM

## 2024-01-08 ENCOUNTER — CARE COORDINATION (OUTPATIENT)
Dept: CARDIOLOGY | Facility: CLINIC | Age: 59
End: 2024-01-08

## 2024-01-08 ENCOUNTER — ANTICOAGULATION THERAPY VISIT (OUTPATIENT)
Dept: ANTICOAGULATION | Facility: CLINIC | Age: 59
End: 2024-01-08

## 2024-01-08 DIAGNOSIS — I50.23 ACUTE ON CHRONIC SYSTOLIC CONGESTIVE HEART FAILURE (H): Primary | ICD-10-CM

## 2024-01-08 DIAGNOSIS — Z79.899 LONG TERM USE OF DRUG: ICD-10-CM

## 2024-01-08 DIAGNOSIS — Z95.811 LVAD (LEFT VENTRICULAR ASSIST DEVICE) PRESENT (H): ICD-10-CM

## 2024-01-08 DIAGNOSIS — I50.22 CHRONIC SYSTOLIC CONGESTIVE HEART FAILURE (H): ICD-10-CM

## 2024-01-08 DIAGNOSIS — Z79.01 ANTICOAGULATED ON COUMADIN: ICD-10-CM

## 2024-01-08 DIAGNOSIS — Z95.811 LEFT VENTRICULAR ASSIST DEVICE PRESENT (H): ICD-10-CM

## 2024-01-08 LAB
INR HOME MONITORING: 2.5 (ref 2–3)
LABCORP INTERFACED MISCELLANEOUS TEST RESULT: ABNORMAL
MDC_IDC_LEAD_CONNECTION_STATUS: NORMAL
MDC_IDC_LEAD_IMPLANT_DT: NORMAL
MDC_IDC_LEAD_LOCATION: NORMAL
MDC_IDC_LEAD_MFG: NORMAL
MDC_IDC_LEAD_MODEL: NORMAL
MDC_IDC_LEAD_POLARITY_TYPE: NORMAL
MDC_IDC_LEAD_SERIAL: NORMAL
MDC_IDC_MSMT_BATTERY_DTM: NORMAL
MDC_IDC_MSMT_BATTERY_REMAINING_LONGEVITY: 114 MO
MDC_IDC_MSMT_BATTERY_REMAINING_PERCENTAGE: 100 %
MDC_IDC_MSMT_BATTERY_STATUS: NORMAL
MDC_IDC_MSMT_CAP_CHARGE_DTM: NORMAL
MDC_IDC_MSMT_CAP_CHARGE_TIME: 10.5 S
MDC_IDC_MSMT_CAP_CHARGE_TYPE: NORMAL
MDC_IDC_MSMT_LEADCHNL_RV_IMPEDANCE_VALUE: 446 OHM
MDC_IDC_MSMT_LEADCHNL_RV_PACING_THRESHOLD_AMPLITUDE: 0.6 V
MDC_IDC_MSMT_LEADCHNL_RV_PACING_THRESHOLD_PULSEWIDTH: 0.4 MS
MDC_IDC_PG_IMPLANT_DTM: NORMAL
MDC_IDC_PG_MFG: NORMAL
MDC_IDC_PG_MODEL: NORMAL
MDC_IDC_PG_SERIAL: NORMAL
MDC_IDC_PG_TYPE: NORMAL
MDC_IDC_SESS_CLINIC_NAME: NORMAL
MDC_IDC_SESS_DTM: NORMAL
MDC_IDC_SESS_TYPE: NORMAL
MDC_IDC_SET_BRADY_LOWRATE: 40 {BEATS}/MIN
MDC_IDC_SET_BRADY_MODE: NORMAL
MDC_IDC_SET_LEADCHNL_RV_PACING_AMPLITUDE: 3.5 V
MDC_IDC_SET_LEADCHNL_RV_PACING_POLARITY: NORMAL
MDC_IDC_SET_LEADCHNL_RV_PACING_PULSEWIDTH: 0.4 MS
MDC_IDC_SET_LEADCHNL_RV_SENSING_ADAPTATION_MODE: NORMAL
MDC_IDC_SET_LEADCHNL_RV_SENSING_POLARITY: NORMAL
MDC_IDC_SET_LEADCHNL_RV_SENSING_SENSITIVITY: 0.6 MV
MDC_IDC_SET_ZONE_DETECTION_INTERVAL: 300 MS
MDC_IDC_SET_ZONE_DETECTION_INTERVAL: 353 MS
MDC_IDC_SET_ZONE_STATUS: NORMAL
MDC_IDC_SET_ZONE_STATUS: NORMAL
MDC_IDC_SET_ZONE_TYPE: NORMAL
MDC_IDC_SET_ZONE_TYPE: NORMAL
MDC_IDC_SET_ZONE_VENDOR_TYPE: NORMAL
MDC_IDC_SET_ZONE_VENDOR_TYPE: NORMAL
MDC_IDC_STAT_BRADY_DTM_END: NORMAL
MDC_IDC_STAT_BRADY_DTM_START: NORMAL
MDC_IDC_STAT_BRADY_RV_PERCENT_PACED: 1 %
MDC_IDC_STAT_EPISODE_RECENT_COUNT: 0
MDC_IDC_STAT_EPISODE_RECENT_COUNT_DTM_END: NORMAL
MDC_IDC_STAT_EPISODE_RECENT_COUNT_DTM_START: NORMAL
MDC_IDC_STAT_EPISODE_TYPE: NORMAL
MDC_IDC_STAT_EPISODE_VENDOR_TYPE: NORMAL
MDC_IDC_STAT_TACHYTHERAPY_ATP_DELIVERED_RECENT: 0
MDC_IDC_STAT_TACHYTHERAPY_ATP_DELIVERED_TOTAL: 1
MDC_IDC_STAT_TACHYTHERAPY_RECENT_DTM_END: NORMAL
MDC_IDC_STAT_TACHYTHERAPY_RECENT_DTM_START: NORMAL
MDC_IDC_STAT_TACHYTHERAPY_SHOCKS_ABORTED_RECENT: 0
MDC_IDC_STAT_TACHYTHERAPY_SHOCKS_ABORTED_TOTAL: 0
MDC_IDC_STAT_TACHYTHERAPY_SHOCKS_DELIVERED_RECENT: 0
MDC_IDC_STAT_TACHYTHERAPY_SHOCKS_DELIVERED_TOTAL: 0
MDC_IDC_STAT_TACHYTHERAPY_TOTAL_DTM_END: NORMAL
MDC_IDC_STAT_TACHYTHERAPY_TOTAL_DTM_START: NORMAL

## 2024-01-08 RX ORDER — LIDOCAINE HYDROCHLORIDE 10 MG/ML
INJECTION, SOLUTION INFILTRATION; PERINEURAL
Status: DISCONTINUED | OUTPATIENT
Start: 2024-01-04 | End: 2024-01-08

## 2024-01-08 RX ORDER — LIDOCAINE HYDROCHLORIDE 20 MG/ML
INJECTION, SOLUTION INFILTRATION; PERINEURAL
Status: DISCONTINUED | OUTPATIENT
Start: 2024-01-04 | End: 2024-01-08

## 2024-01-08 NOTE — ANESTHESIA PROCEDURE NOTES
Arterial Line Procedure Note    Pre-Procedure   Staff -        Anesthesiologist:  Joey Lombardo MD       Performed By: anesthesiologist       Location: pre-op       Pre-Anesthestic Checklist: patient identified, IV checked, risks and benefits discussed, informed consent, monitors and equipment checked, pre-op evaluation and at physician/surgeon's request  Timeout:       Correct Patient: Yes        Correct Procedure: Yes        Correct Site: Yes        Correct Position: Yes   Line Placement:   This line was placed Pre Induction starting at 1/4/2024 12:45 PM and ending at 1/4/2024 12:50 PM  Procedure   Procedure: arterial line       Laterality: left       Insertion Site: ulnar.  Sterile Prep        Standard elements of sterile barrier followed       Skin prep: Chloraprep  Insertion/Injection        Technique: ultrasound guided and Seldinger Technique        1. Ultrasound was used to evaluate the access site.       2. Artery evaluated via ultrasound for patency/adequacy.       3. Using real-time ultrasound the needle/catheter was observed entering the artery/vein.       Catheter Type/Size: 20 G, 1.75 in/4.5 cm quick cath (integral wire)  Narrative        Tegaderm dressing used.       Complications: None apparent,        Arterial waveform: Yes        IBP within 10% of NIBP: Yes

## 2024-01-08 NOTE — PROGRESS NOTES
Called pt to check in post hospitalization and to discuss stopping Aspirin.  Pt did not answer call.  Message left requesting return call.  Will await call.

## 2024-01-08 NOTE — PROGRESS NOTES
ANTICOAGULATION MANAGEMENT     Akshat Fragoso 58 year old male is on warfarin with therapeutic INR result. (Goal INR 2.0-3.0)    Recent labs: (last 7 days)     01/07/24  0000   INR 2.5       ASSESSMENT     Source(s): Chart Review  Previous INR was Subtherapeutic  Medication, diet, health changes since last INR chart reviewed; Medications: Keflex 5 day course started on 1/4/23.   New illness, injury, or hospitalization: Yes: Hospitalized 1/4-1/5 for pacemaker lead change.         PLAN     Recommended plan for temporary change(s) affecting INR     Dosing Instructions: Continue your current warfarin dose with next INR in 3 days       Summary  As of 1/8/2024      Full warfarin instructions:  5 mg every Mon, Wed, Fri; 2.5 mg all other days   Next INR check:  1/11/2024               Detailed voice message left for Akshat with dosing instructions and follow up date. My chart message sent.     Lab visit scheduled    Education provided:   Please call back if any changes to your diet, medications or how you've been taking warfarin    Plan made per [facility] anticoagulation protocol and per LVAD protocol    Denisse Pillai RN  Anticoagulation Clinic  1/8/2024    _______________________________________________________________________     Anticoagulation Episode Summary       Current INR goal:  2.0-3.0   TTR:  72.2% (8.8 mo)   Target end date:  Indefinite   Send INR reminders to:  ANTICOAG LVAD    Indications    Acute on chronic systolic congestive heart failure (H) [I50.23]  LVAD (left ventricular assist device) present (H) [Z95.811]  Chronic systolic congestive heart failure (H) [I50.22]  Long term use of drug [Z79.899]  Left ventricular assist device present (H) [Z95.811]  Anticoagulated on Coumadin [Z79.01]             Comments:  Follow VAD Anticoag protocol:Yes: HeartMate 3   Bridging: Enoxaparin   Date VAD placed: 3/23/23             Anticoagulation Care Providers       Provider Role Specialty Phone number    Kenzie Moreau MD  Referring Advanced Heart Failure and Transplant Cardiology [phone]    Mile Bolivar, APRN CNP Referring Nurse Practitioner [phone]    Shaun Aguiar MD Referring Cardiovascular Disease [phone]

## 2024-01-11 ENCOUNTER — TELEPHONE (OUTPATIENT)
Dept: INTERNAL MEDICINE | Facility: CLINIC | Age: 59
End: 2024-01-11

## 2024-01-11 NOTE — TELEPHONE ENCOUNTER
[facility] Call Center    Phone Message    May a detailed message be left on voicemail: yes     Reason for Call: Other: Patient's wife calling stating she cancelled hospital follow up appt today for Akshat as she just had surgery yesterday and they are down and out today. Wondering if this appt is necessary and if so she'll call next week and reschedule. She states Akshat takes a home INR test and sees cardiac device on Monday 01/15/2024. Please call and advise.

## 2024-01-11 NOTE — TELEPHONE ENCOUNTER
RN called patient's spouse and relayed Dr. Estevez's message.    Kat Umaña RN on 1/11/2024 at 1:23 PM

## 2024-01-11 NOTE — TELEPHONE ENCOUNTER
Attending the device check appointment next week is probably sufficient; it doesn't look like there is much else to follow-up on that was mentioned in the discharge summary.    Thanks,  Darian Estevez MD

## 2024-01-14 LAB — INR HOME MONITORING: 2 (ref 2–3)

## 2024-01-15 ENCOUNTER — ANCILLARY PROCEDURE (OUTPATIENT)
Dept: CARDIOLOGY | Facility: CLINIC | Age: 59
End: 2024-01-15
Attending: INTERNAL MEDICINE
Payer: COMMERCIAL

## 2024-01-15 ENCOUNTER — ANTICOAGULATION THERAPY VISIT (OUTPATIENT)
Dept: ANTICOAGULATION | Facility: CLINIC | Age: 59
End: 2024-01-15

## 2024-01-15 DIAGNOSIS — I50.22 CHRONIC SYSTOLIC HEART FAILURE (H): ICD-10-CM

## 2024-01-15 DIAGNOSIS — I50.23 ACUTE ON CHRONIC SYSTOLIC CONGESTIVE HEART FAILURE (H): Primary | ICD-10-CM

## 2024-01-15 DIAGNOSIS — I50.22 CHRONIC SYSTOLIC CONGESTIVE HEART FAILURE (H): ICD-10-CM

## 2024-01-15 DIAGNOSIS — Z95.810 ICD (IMPLANTABLE CARDIOVERTER-DEFIBRILLATOR) IN PLACE: ICD-10-CM

## 2024-01-15 DIAGNOSIS — Z79.899 LONG TERM USE OF DRUG: ICD-10-CM

## 2024-01-15 DIAGNOSIS — Z95.811 LEFT VENTRICULAR ASSIST DEVICE PRESENT (H): ICD-10-CM

## 2024-01-15 DIAGNOSIS — Z95.811 LVAD (LEFT VENTRICULAR ASSIST DEVICE) PRESENT (H): ICD-10-CM

## 2024-01-15 DIAGNOSIS — Z79.01 ANTICOAGULATED ON COUMADIN: ICD-10-CM

## 2024-01-15 DIAGNOSIS — I42.9 CARDIOMYOPATHY (H): ICD-10-CM

## 2024-01-15 LAB
MDC_IDC_LEAD_CONNECTION_STATUS: NORMAL
MDC_IDC_LEAD_IMPLANT_DT: NORMAL
MDC_IDC_LEAD_LOCATION: NORMAL
MDC_IDC_LEAD_LOCATION_DETAIL_1: NORMAL
MDC_IDC_LEAD_MFG: NORMAL
MDC_IDC_LEAD_MODEL: NORMAL
MDC_IDC_LEAD_POLARITY_TYPE: NORMAL
MDC_IDC_LEAD_SERIAL: NORMAL
MDC_IDC_MSMT_BATTERY_DTM: NORMAL
MDC_IDC_MSMT_BATTERY_REMAINING_LONGEVITY: 120 MO
MDC_IDC_MSMT_BATTERY_REMAINING_PERCENTAGE: 100 %
MDC_IDC_MSMT_BATTERY_STATUS: NORMAL
MDC_IDC_MSMT_CAP_CHARGE_DTM: NORMAL
MDC_IDC_MSMT_CAP_CHARGE_TIME: 10.5 S
MDC_IDC_MSMT_CAP_CHARGE_TYPE: NORMAL
MDC_IDC_MSMT_LEADCHNL_RV_IMPEDANCE_VALUE: 424 OHM
MDC_IDC_MSMT_LEADCHNL_RV_PACING_THRESHOLD_AMPLITUDE: 0.9 V
MDC_IDC_MSMT_LEADCHNL_RV_PACING_THRESHOLD_PULSEWIDTH: 0.4 MS
MDC_IDC_PG_IMPLANT_DTM: NORMAL
MDC_IDC_PG_MFG: NORMAL
MDC_IDC_PG_MODEL: NORMAL
MDC_IDC_PG_SERIAL: NORMAL
MDC_IDC_PG_TYPE: NORMAL
MDC_IDC_SESS_CLINIC_NAME: NORMAL
MDC_IDC_SESS_DTM: NORMAL
MDC_IDC_SESS_TYPE: NORMAL
MDC_IDC_SET_BRADY_LOWRATE: 40 {BEATS}/MIN
MDC_IDC_SET_BRADY_MODE: NORMAL
MDC_IDC_SET_LEADCHNL_RV_PACING_AMPLITUDE: 3.5 V
MDC_IDC_SET_LEADCHNL_RV_PACING_POLARITY: NORMAL
MDC_IDC_SET_LEADCHNL_RV_PACING_PULSEWIDTH: 0.4 MS
MDC_IDC_SET_LEADCHNL_RV_SENSING_ADAPTATION_MODE: NORMAL
MDC_IDC_SET_LEADCHNL_RV_SENSING_POLARITY: NORMAL
MDC_IDC_SET_LEADCHNL_RV_SENSING_SENSITIVITY: 0.6 MV
MDC_IDC_SET_ZONE_DETECTION_INTERVAL: 300 MS
MDC_IDC_SET_ZONE_DETECTION_INTERVAL: 353 MS
MDC_IDC_SET_ZONE_STATUS: NORMAL
MDC_IDC_SET_ZONE_STATUS: NORMAL
MDC_IDC_SET_ZONE_TYPE: NORMAL
MDC_IDC_SET_ZONE_TYPE: NORMAL
MDC_IDC_SET_ZONE_VENDOR_TYPE: NORMAL
MDC_IDC_SET_ZONE_VENDOR_TYPE: NORMAL
MDC_IDC_STAT_BRADY_DTM_END: NORMAL
MDC_IDC_STAT_BRADY_DTM_START: NORMAL
MDC_IDC_STAT_BRADY_RV_PERCENT_PACED: 1 %
MDC_IDC_STAT_EPISODE_RECENT_COUNT: 0
MDC_IDC_STAT_EPISODE_RECENT_COUNT_DTM_END: NORMAL
MDC_IDC_STAT_EPISODE_RECENT_COUNT_DTM_START: NORMAL
MDC_IDC_STAT_EPISODE_TYPE: NORMAL
MDC_IDC_STAT_EPISODE_VENDOR_TYPE: NORMAL
MDC_IDC_STAT_TACHYTHERAPY_ATP_DELIVERED_RECENT: 0
MDC_IDC_STAT_TACHYTHERAPY_ATP_DELIVERED_TOTAL: 1
MDC_IDC_STAT_TACHYTHERAPY_RECENT_DTM_END: NORMAL
MDC_IDC_STAT_TACHYTHERAPY_RECENT_DTM_START: NORMAL
MDC_IDC_STAT_TACHYTHERAPY_SHOCKS_ABORTED_RECENT: 0
MDC_IDC_STAT_TACHYTHERAPY_SHOCKS_ABORTED_TOTAL: 0
MDC_IDC_STAT_TACHYTHERAPY_SHOCKS_DELIVERED_RECENT: 0
MDC_IDC_STAT_TACHYTHERAPY_SHOCKS_DELIVERED_TOTAL: 0
MDC_IDC_STAT_TACHYTHERAPY_TOTAL_DTM_END: NORMAL
MDC_IDC_STAT_TACHYTHERAPY_TOTAL_DTM_START: NORMAL

## 2024-01-15 PROCEDURE — 93282 PRGRMG EVAL IMPLANTABLE DFB: CPT | Performed by: INTERNAL MEDICINE

## 2024-01-15 NOTE — PROGRESS NOTES
ANTICOAGULATION MANAGEMENT     Akshat Fragoso 58 year old male is on warfarin with therapeutic INR result. (Goal INR 2.0-3.0)    Recent labs: (last 7 days)     01/14/24  0000   INR 2.0       ASSESSMENT     Source(s): Chart Review  Previous INR was Therapeutic last visit; previously outside of goal range  Medication, diet, health changes since last INR chart reviewed; none identified         PLAN     Recommended plan for no diet, medication or health factor changes affecting INR     Dosing Instructions: Continue your current warfarin dose with next INR in 2 weeks       Summary  As of 1/15/2024      Full warfarin instructions:  5 mg every Mon, Wed, Fri; 2.5 mg all other days   Next INR check:  1/29/2024               Detailed voice message left for Akshat with dosing instructions and follow up date.     Patient to recheck with home meter    Education provided:   Please call back if any changes to your diet, medications or how you've been taking warfarin  Goal range and lab monitoring: goal range and significance of current result and Importance of following up at instructed interval  Contact [phone] with any changes, questions or concerns.     Plan made per [facility] anticoagulation protocol and per LVAD protocol    TREV LYN RN  Anticoagulation Clinic  1/15/2024    _______________________________________________________________________     Anticoagulation Episode Summary       Current INR goal:  2.0-3.0   TTR:  72.9% (9.1 mo)   Target end date:  Indefinite   Send INR reminders to:  ANTICOAG LVAD    Indications    Acute on chronic systolic congestive heart failure (H) [I50.23]  LVAD (left ventricular assist device) present (H) [Z95.811]  Chronic systolic congestive heart failure (H) [I50.22]  Long term use of drug [Z79.899]  Left ventricular assist device present (H) [Z95.811]  Anticoagulated on Coumadin [Z79.01]             Comments:  Follow VAD Anticoag protocol:Yes: HeartMate 3   Bridging: Enoxaparin   Date VAD  placed: 3/23/23             Anticoagulation Care Providers       Provider Role Specialty Phone number    Kenzie Moreau MD Referring Advanced Heart Failure and Transplant Cardiology [phone]    Mile Bolivar, APRN CNP Referring Nurse Practitioner [phone]    Shaun Aguiar MD Referring Cardiovascular Disease [phone]

## 2024-01-18 ENCOUNTER — MYC MEDICAL ADVICE (OUTPATIENT)
Dept: CARDIOLOGY | Facility: CLINIC | Age: 59
End: 2024-01-18
Payer: COMMERCIAL

## 2024-01-19 ENCOUNTER — ANTICOAGULATION THERAPY VISIT (OUTPATIENT)
Dept: ANTICOAGULATION | Facility: CLINIC | Age: 59
End: 2024-01-19
Payer: COMMERCIAL

## 2024-01-19 DIAGNOSIS — Z95.811 LVAD (LEFT VENTRICULAR ASSIST DEVICE) PRESENT (H): ICD-10-CM

## 2024-01-19 DIAGNOSIS — Z95.811 LEFT VENTRICULAR ASSIST DEVICE PRESENT (H): ICD-10-CM

## 2024-01-19 DIAGNOSIS — I50.23 ACUTE ON CHRONIC SYSTOLIC CONGESTIVE HEART FAILURE (H): Primary | ICD-10-CM

## 2024-01-19 DIAGNOSIS — Z79.899 LONG TERM USE OF DRUG: ICD-10-CM

## 2024-01-19 DIAGNOSIS — Z79.01 ANTICOAGULATED ON COUMADIN: ICD-10-CM

## 2024-01-19 DIAGNOSIS — I50.22 CHRONIC SYSTOLIC CONGESTIVE HEART FAILURE (H): ICD-10-CM

## 2024-01-19 LAB — INR HOME MONITORING: 2.2 (ref 2–3)

## 2024-01-19 NOTE — PROGRESS NOTES
ANTICOAGULATION MANAGEMENT     Akshat Fragoso 58 year old male is on warfarin with therapeutic INR result. (Goal INR 2.0-3.0)    Recent labs: (last 7 days)     01/19/24  0000   INR 2.2       ASSESSMENT     Source(s): Chart Review  Previous INR was Therapeutic last 2(+) visits  Medication, diet, health changes since last INR chart reviewed; none identified         PLAN     Recommended plan for no diet, medication or health factor changes affecting INR     Dosing Instructions: Continue your current warfarin dose with next INR in 2 weeks       Summary  As of 1/19/2024      Full warfarin instructions:  5 mg every Mon, Wed, Fri; 2.5 mg all other days   Next INR check:  2/2/2024               Telephone call with Akshat who verbalizes understanding and agrees to plan    Patient to recheck with home meter    Education provided:   Please call back if any changes to your diet, medications or how you've been taking warfarin  Contact [phone] with any changes, questions or concerns.     Plan made per [facility] anticoagulation protocol and per LVAD protocol    [place]yenne [place] RN  Anticoagulation Clinic  1/19/2024    _______________________________________________________________________     Anticoagulation Episode Summary       Current INR goal:  2.0-3.0   TTR:  73.4% (9.2 mo)   Target end date:  Indefinite   Send INR reminders to:  ANTICOAG LVAD    Indications    Acute on chronic systolic congestive heart failure (H) [I50.23]  LVAD (left ventricular assist device) present (H) [Z95.811]  Chronic systolic congestive heart failure (H) [I50.22]  Long term use of drug [Z79.899]  Left ventricular assist device present (H) [Z95.811]  Anticoagulated on Coumadin [Z79.01]             Comments:  Follow VAD Anticoag protocol:Yes: HeartMate 3   Bridging: Enoxaparin   Date VAD placed: 3/23/23             Anticoagulation Care Providers       Provider Role Specialty Phone number    Kenzie Moreau MD Referring Advanced Heart Failure and  Transplant Cardiology [phone]    Mile Bolivar, VERONICA CNP Referring Nurse Practitioner [phone]    Shaun Aguiar MD Referring Cardiovascular Disease [phone]

## 2024-01-22 ENCOUNTER — CARE COORDINATION (OUTPATIENT)
Dept: CARDIOLOGY | Facility: CLINIC | Age: 59
End: 2024-01-22
Payer: COMMERCIAL

## 2024-01-22 NOTE — PROGRESS NOTES
3rd attempt made to discuss stopping Aspirin with pt.  Called pt. No answer.  Left voicemail requesting return call.  Pt was to stop aspirin 1/8/24 as directed by Crissy SMITH.    Will await return call.      UPDATE  Pt returned call.  Discussed follow up appt's due in March. Still awaiting scheduling for palliative, all other testing ordered and scheduled.  Discussed stopping ASA.  Explained reasons for stopping.  Med discontinued. Anticoagulation team informed.    Pt reports feeling well, with stable weights and LVAD parameters.  Pt reporting some ongoing, mild discomfort at the site of his pacemaker surgery.  Encouraged pt to f/up with EP as needed.

## 2024-01-22 NOTE — PROGRESS NOTES
1/22/24: received message from LVAD team stating ASA has been discontinued.  Yessenia Mcfarland RN

## 2024-01-27 ENCOUNTER — MYC REFILL (OUTPATIENT)
Dept: CARDIOLOGY | Facility: CLINIC | Age: 59
End: 2024-01-27
Payer: COMMERCIAL

## 2024-01-27 DIAGNOSIS — Z95.811 LVAD (LEFT VENTRICULAR ASSIST DEVICE) PRESENT (H): ICD-10-CM

## 2024-01-27 DIAGNOSIS — I50.22 CHRONIC SYSTOLIC CONGESTIVE HEART FAILURE (H): ICD-10-CM

## 2024-01-27 DIAGNOSIS — Z95.811 LEFT VENTRICULAR ASSIST DEVICE PRESENT (H): ICD-10-CM

## 2024-01-27 RX ORDER — DIGOXIN 250 MCG
250 TABLET ORAL DAILY
Qty: 30 TABLET | Refills: 11 | Status: CANCELLED | OUTPATIENT
Start: 2024-01-27

## 2024-01-27 RX ORDER — AMIODARONE HYDROCHLORIDE 200 MG/1
200 TABLET ORAL DAILY
Qty: 30 TABLET | Refills: 11 | Status: CANCELLED | OUTPATIENT
Start: 2024-01-27

## 2024-01-27 RX ORDER — EPLERENONE 25 MG/1
TABLET, FILM COATED ORAL
Qty: 45 TABLET | Refills: 3 | Status: CANCELLED | OUTPATIENT
Start: 2024-01-27

## 2024-01-29 NOTE — TELEPHONE ENCOUNTER
Last Clinic Visit: 7/25/2023 [facility]    amiodarone (PACERONE) 200 MG tablet: refills on file   digoxin (LANOXIN) 250 MCG tablet: refills on file  eplerenone (INSPRA) 25 MG tablet: refills on file

## 2024-01-31 ENCOUNTER — TELEPHONE (OUTPATIENT)
Dept: TRANSPLANT | Facility: CLINIC | Age: 59
End: 2024-01-31
Payer: COMMERCIAL

## 2024-01-31 ENCOUNTER — TELEPHONE (OUTPATIENT)
Facility: CLINIC | Age: 59
End: 2024-01-31

## 2024-01-31 NOTE — PROGRESS NOTES
VAD Social Work Services Phone Call      Data: Patient was scheduled for annual SW visit this morning at 9am. When writer called, was only able to leave a message. Asked Akshat to call writer back.  Intervention: Called Akshat this morning and asked him to call back.  Assessment: Unable to connect this morning  Education provided by SW: None  Plan:SW will try again later or tomorrow to reach Akshat. I see that he has a video visit with palliative care at 10. Will wait to see if he is available for this appointment.

## 2024-01-31 NOTE — TELEPHONE ENCOUNTER
Patient needs to be rescheduled for their virtual visit due to Reason for Reschedule: No-Show  Unable to reach patient for video visit, M x3    Appointment mode: Video  Provider: MD Shelia Rankin, VIRAL/LPN

## 2024-02-02 LAB
MDC_IDC_EPISODE_DTM: NORMAL
MDC_IDC_EPISODE_ID: NORMAL
MDC_IDC_EPISODE_TYPE: NORMAL
MDC_IDC_LEAD_CONNECTION_STATUS: NORMAL
MDC_IDC_LEAD_IMPLANT_DT: NORMAL
MDC_IDC_LEAD_LOCATION: NORMAL
MDC_IDC_LEAD_LOCATION_DETAIL_1: NORMAL
MDC_IDC_LEAD_MFG: NORMAL
MDC_IDC_LEAD_MODEL: NORMAL
MDC_IDC_LEAD_POLARITY_TYPE: NORMAL
MDC_IDC_LEAD_SERIAL: NORMAL
MDC_IDC_MSMT_BATTERY_DTM: NORMAL
MDC_IDC_MSMT_BATTERY_REMAINING_LONGEVITY: 132 MO
MDC_IDC_MSMT_BATTERY_REMAINING_PERCENTAGE: 100 %
MDC_IDC_MSMT_BATTERY_STATUS: NORMAL
MDC_IDC_MSMT_CAP_CHARGE_DTM: NORMAL
MDC_IDC_MSMT_CAP_CHARGE_TIME: 10.4 S
MDC_IDC_MSMT_CAP_CHARGE_TYPE: NORMAL
MDC_IDC_MSMT_LEADCHNL_RV_IMPEDANCE_VALUE: 203 OHM
MDC_IDC_MSMT_LEADCHNL_RV_LEAD_CHANNEL_STATUS: NORMAL
MDC_IDC_MSMT_LEADCHNL_RV_PACING_THRESHOLD_AMPLITUDE: 1.5 V
MDC_IDC_MSMT_LEADCHNL_RV_PACING_THRESHOLD_PULSEWIDTH: 0.4 MS
MDC_IDC_PG_IMPLANT_DTM: NORMAL
MDC_IDC_PG_MFG: NORMAL
MDC_IDC_PG_MODEL: NORMAL
MDC_IDC_PG_SERIAL: NORMAL
MDC_IDC_PG_TYPE: NORMAL
MDC_IDC_SESS_CLINIC_NAME: NORMAL
MDC_IDC_SESS_DTM: NORMAL
MDC_IDC_SESS_TYPE: NORMAL
MDC_IDC_SET_BRADY_LOWRATE: 40 {BEATS}/MIN
MDC_IDC_SET_BRADY_MODE: NORMAL
MDC_IDC_SET_LEADCHNL_RV_PACING_AMPLITUDE: 2.8 V
MDC_IDC_SET_LEADCHNL_RV_PACING_POLARITY: NORMAL
MDC_IDC_SET_LEADCHNL_RV_PACING_PULSEWIDTH: 0.4 MS
MDC_IDC_SET_LEADCHNL_RV_SENSING_ADAPTATION_MODE: NORMAL
MDC_IDC_SET_LEADCHNL_RV_SENSING_POLARITY: NORMAL
MDC_IDC_SET_LEADCHNL_RV_SENSING_SENSITIVITY: 0.6 MV
MDC_IDC_SET_ZONE_DETECTION_INTERVAL: 300 MS
MDC_IDC_SET_ZONE_DETECTION_INTERVAL: 353 MS
MDC_IDC_SET_ZONE_STATUS: NORMAL
MDC_IDC_SET_ZONE_STATUS: NORMAL
MDC_IDC_SET_ZONE_TYPE: NORMAL
MDC_IDC_SET_ZONE_TYPE: NORMAL
MDC_IDC_SET_ZONE_VENDOR_TYPE: NORMAL
MDC_IDC_SET_ZONE_VENDOR_TYPE: NORMAL
MDC_IDC_STAT_BRADY_DTM_END: NORMAL
MDC_IDC_STAT_BRADY_DTM_START: NORMAL
MDC_IDC_STAT_BRADY_RV_PERCENT_PACED: 0 %
MDC_IDC_STAT_EPISODE_RECENT_COUNT: 0
MDC_IDC_STAT_EPISODE_RECENT_COUNT_DTM_END: NORMAL
MDC_IDC_STAT_EPISODE_RECENT_COUNT_DTM_START: NORMAL
MDC_IDC_STAT_EPISODE_TYPE: NORMAL
MDC_IDC_STAT_EPISODE_VENDOR_TYPE: NORMAL
MDC_IDC_STAT_TACHYTHERAPY_ATP_DELIVERED_RECENT: 0
MDC_IDC_STAT_TACHYTHERAPY_ATP_DELIVERED_TOTAL: 1
MDC_IDC_STAT_TACHYTHERAPY_RECENT_DTM_END: NORMAL
MDC_IDC_STAT_TACHYTHERAPY_RECENT_DTM_START: NORMAL
MDC_IDC_STAT_TACHYTHERAPY_SHOCKS_ABORTED_RECENT: 0
MDC_IDC_STAT_TACHYTHERAPY_SHOCKS_ABORTED_TOTAL: 0
MDC_IDC_STAT_TACHYTHERAPY_SHOCKS_DELIVERED_RECENT: 0
MDC_IDC_STAT_TACHYTHERAPY_SHOCKS_DELIVERED_TOTAL: 0
MDC_IDC_STAT_TACHYTHERAPY_TOTAL_DTM_END: NORMAL
MDC_IDC_STAT_TACHYTHERAPY_TOTAL_DTM_START: NORMAL

## 2024-02-05 ENCOUNTER — ANTICOAGULATION THERAPY VISIT (OUTPATIENT)
Dept: ANTICOAGULATION | Facility: CLINIC | Age: 59
End: 2024-02-05
Payer: COMMERCIAL

## 2024-02-05 DIAGNOSIS — I50.23 ACUTE ON CHRONIC SYSTOLIC CONGESTIVE HEART FAILURE (H): Primary | ICD-10-CM

## 2024-02-05 DIAGNOSIS — Z95.811 LVAD (LEFT VENTRICULAR ASSIST DEVICE) PRESENT (H): ICD-10-CM

## 2024-02-05 DIAGNOSIS — I50.22 CHRONIC SYSTOLIC CONGESTIVE HEART FAILURE (H): ICD-10-CM

## 2024-02-05 DIAGNOSIS — Z79.899 LONG TERM USE OF DRUG: ICD-10-CM

## 2024-02-05 DIAGNOSIS — Z79.01 ANTICOAGULATED ON COUMADIN: ICD-10-CM

## 2024-02-05 DIAGNOSIS — Z95.811 LEFT VENTRICULAR ASSIST DEVICE PRESENT (H): ICD-10-CM

## 2024-02-05 LAB — INR HOME MONITORING: 2.3 (ref 2–3)

## 2024-02-05 NOTE — PROGRESS NOTES
ANTICOAGULATION MANAGEMENT     Akshat Fragoso 58 year old male is on warfarin with therapeutic INR result. (Goal INR 2.0-3.0)    Recent labs: (last 7 days)     02/05/24  0000   INR 2.3       ASSESSMENT     Source(s): Chart Review  Previous INR was Therapeutic last 2(+) visits  Medication, diet, health changes since last INR chart reviewed; none identified         PLAN     Recommended plan for no diet, medication or health factor changes affecting INR     Dosing Instructions: Continue your current warfarin dose with next INR in 2 weeks       Summary  As of 2/5/2024      Full warfarin instructions:  5 mg every Mon, Wed, Fri; 2.5 mg all other days   Next INR check:  2/19/2024               Detailed voice message left for Akshat with dosing instructions and follow up date.   Sent [employer] message with dosing and follow up instructions    Patient to recheck with home meter    Education provided:   Please call back if any changes to your diet, medications or how you've been taking warfarin  Goal range and lab monitoring: goal range and significance of current result and Importance of following up at instructed interval  Contact [phone] with any changes, questions or concerns.     Plan made per [facility] anticoagulation protocol and per LVAD protocol    TREV LYN RN  Anticoagulation Clinic  2/5/2024    _______________________________________________________________________     Anticoagulation Episode Summary       Current INR goal:  2.0-3.0   TTR:  75.0% (9.8 mo)   Target end date:  Indefinite   Send INR reminders to:  ANTICOAG LVAD    Indications    Acute on chronic systolic congestive heart failure (H) [I50.23]  LVAD (left ventricular assist device) present (H) [Z95.811]  Chronic systolic congestive heart failure (H) [I50.22]  Long term use of drug [Z79.899]  Left ventricular assist device present (H) [Z95.811]  Anticoagulated on Coumadin [Z79.01]             Comments:  Follow VAD Anticoag protocol:Yes: HeartMate 3    Bridging: Enoxaparin   Date VAD placed: 3/23/23             Anticoagulation Care Providers       Provider Role Specialty Phone number    Kenzie Moreau MD Referring Advanced Heart Failure and Transplant Cardiology [phone]    Mile Bolivar, VERONICA CNP Referring Nurse Practitioner [phone]    Shaun Aguiar MD Referring Cardiovascular Disease [phone]

## 2024-02-06 LAB
MDC_IDC_EPISODE_DTM: NORMAL
MDC_IDC_EPISODE_ID: NORMAL
MDC_IDC_EPISODE_TYPE: NORMAL
MDC_IDC_LEAD_CONNECTION_STATUS: NORMAL
MDC_IDC_LEAD_IMPLANT_DT: NORMAL
MDC_IDC_LEAD_LOCATION: NORMAL
MDC_IDC_LEAD_LOCATION_DETAIL_1: NORMAL
MDC_IDC_LEAD_MFG: NORMAL
MDC_IDC_LEAD_MODEL: NORMAL
MDC_IDC_LEAD_POLARITY_TYPE: NORMAL
MDC_IDC_LEAD_SERIAL: NORMAL
MDC_IDC_MSMT_BATTERY_DTM: NORMAL
MDC_IDC_MSMT_BATTERY_REMAINING_LONGEVITY: 132 MO
MDC_IDC_MSMT_BATTERY_REMAINING_PERCENTAGE: 100 %
MDC_IDC_MSMT_BATTERY_STATUS: NORMAL
MDC_IDC_MSMT_CAP_CHARGE_DTM: NORMAL
MDC_IDC_MSMT_CAP_CHARGE_TIME: 10.5 S
MDC_IDC_MSMT_CAP_CHARGE_TYPE: NORMAL
MDC_IDC_MSMT_LEADCHNL_RV_IMPEDANCE_VALUE: 201 OHM
MDC_IDC_MSMT_LEADCHNL_RV_LEAD_CHANNEL_STATUS: NORMAL
MDC_IDC_MSMT_LEADCHNL_RV_PACING_THRESHOLD_AMPLITUDE: 2.2 V
MDC_IDC_MSMT_LEADCHNL_RV_PACING_THRESHOLD_PULSEWIDTH: 0.4 MS
MDC_IDC_PG_IMPLANT_DTM: NORMAL
MDC_IDC_PG_MFG: NORMAL
MDC_IDC_PG_MODEL: NORMAL
MDC_IDC_PG_SERIAL: NORMAL
MDC_IDC_PG_TYPE: NORMAL
MDC_IDC_SESS_CLINIC_NAME: NORMAL
MDC_IDC_SESS_DTM: NORMAL
MDC_IDC_SESS_TYPE: NORMAL
MDC_IDC_SET_BRADY_LOWRATE: 40 {BEATS}/MIN
MDC_IDC_SET_BRADY_MODE: NORMAL
MDC_IDC_SET_LEADCHNL_RV_PACING_AMPLITUDE: 3 V
MDC_IDC_SET_LEADCHNL_RV_PACING_POLARITY: NORMAL
MDC_IDC_SET_LEADCHNL_RV_PACING_PULSEWIDTH: 0.4 MS
MDC_IDC_SET_LEADCHNL_RV_SENSING_ADAPTATION_MODE: NORMAL
MDC_IDC_SET_LEADCHNL_RV_SENSING_POLARITY: NORMAL
MDC_IDC_SET_LEADCHNL_RV_SENSING_SENSITIVITY: 0.6 MV
MDC_IDC_SET_ZONE_DETECTION_INTERVAL: 300 MS
MDC_IDC_SET_ZONE_DETECTION_INTERVAL: 353 MS
MDC_IDC_SET_ZONE_STATUS: NORMAL
MDC_IDC_SET_ZONE_STATUS: NORMAL
MDC_IDC_SET_ZONE_TYPE: NORMAL
MDC_IDC_SET_ZONE_TYPE: NORMAL
MDC_IDC_SET_ZONE_VENDOR_TYPE: NORMAL
MDC_IDC_SET_ZONE_VENDOR_TYPE: NORMAL
MDC_IDC_STAT_BRADY_DTM_END: NORMAL
MDC_IDC_STAT_BRADY_DTM_START: NORMAL
MDC_IDC_STAT_BRADY_RV_PERCENT_PACED: 0 %
MDC_IDC_STAT_EPISODE_RECENT_COUNT: 0
MDC_IDC_STAT_EPISODE_RECENT_COUNT_DTM_END: NORMAL
MDC_IDC_STAT_EPISODE_RECENT_COUNT_DTM_START: NORMAL
MDC_IDC_STAT_EPISODE_TYPE: NORMAL
MDC_IDC_STAT_EPISODE_VENDOR_TYPE: NORMAL
MDC_IDC_STAT_TACHYTHERAPY_ATP_DELIVERED_RECENT: 0
MDC_IDC_STAT_TACHYTHERAPY_ATP_DELIVERED_TOTAL: 1
MDC_IDC_STAT_TACHYTHERAPY_RECENT_DTM_END: NORMAL
MDC_IDC_STAT_TACHYTHERAPY_RECENT_DTM_START: NORMAL
MDC_IDC_STAT_TACHYTHERAPY_SHOCKS_ABORTED_RECENT: 0
MDC_IDC_STAT_TACHYTHERAPY_SHOCKS_ABORTED_TOTAL: 0
MDC_IDC_STAT_TACHYTHERAPY_SHOCKS_DELIVERED_RECENT: 0
MDC_IDC_STAT_TACHYTHERAPY_SHOCKS_DELIVERED_TOTAL: 0
MDC_IDC_STAT_TACHYTHERAPY_TOTAL_DTM_END: NORMAL
MDC_IDC_STAT_TACHYTHERAPY_TOTAL_DTM_START: NORMAL

## 2024-02-14 LAB — INR HOME MONITORING: 2.1 (ref 2–3)

## 2024-02-15 ENCOUNTER — ANTICOAGULATION THERAPY VISIT (OUTPATIENT)
Dept: ANTICOAGULATION | Facility: CLINIC | Age: 59
End: 2024-02-15
Payer: COMMERCIAL

## 2024-02-15 DIAGNOSIS — Z95.811 LEFT VENTRICULAR ASSIST DEVICE PRESENT (H): ICD-10-CM

## 2024-02-15 DIAGNOSIS — I50.22 CHRONIC SYSTOLIC CONGESTIVE HEART FAILURE (H): ICD-10-CM

## 2024-02-15 DIAGNOSIS — Z95.811 LVAD (LEFT VENTRICULAR ASSIST DEVICE) PRESENT (H): ICD-10-CM

## 2024-02-15 DIAGNOSIS — Z79.01 ANTICOAGULATED ON COUMADIN: ICD-10-CM

## 2024-02-15 DIAGNOSIS — Z79.899 LONG TERM USE OF DRUG: ICD-10-CM

## 2024-02-15 DIAGNOSIS — I50.23 ACUTE ON CHRONIC SYSTOLIC CONGESTIVE HEART FAILURE (H): Primary | ICD-10-CM

## 2024-02-15 NOTE — PROGRESS NOTES
ANTICOAGULATION MANAGEMENT     Akshat Fragoso 58 year old male is on warfarin with therapeutic INR result. (Goal INR 2.0-3.0)    Recent labs: (last 7 days)     02/14/24  0000   INR 2.1       ASSESSMENT     Source(s): Chart Review  Previous INR was Therapeutic last 2(+) visits  Medication, diet, health changes since last INR chart reviewed; none identified         PLAN     Recommended plan for no diet, medication or health factor changes affecting INR     Dosing Instructions: Continue your current warfarin dose with next INR in 2 weeks       Summary  As of 2/15/2024      Full warfarin instructions:  5 mg every Mon, Wed, Fri; 2.5 mg all other days   Next INR check:  2/28/2024               Detailed voice message left for Akshat with dosing instructions and follow up date.   Sent [employer] message with dosing and follow up instructions    Patient to recheck with home meter    Education provided:   Please call back if any changes to your diet, medications or how you've been taking warfarin  Goal range and lab monitoring: goal range and significance of current result and Importance of following up at instructed interval  Contact [phone] with any changes, questions or concerns.     Plan made per [facility] anticoagulation protocol and per LVAD protocol    TREV LYN RN  Anticoagulation Clinic  2/15/2024    _______________________________________________________________________     Anticoagulation Episode Summary       Current INR goal:  2.0-3.0   TTR:  75.7% (10.1 mo)   Target end date:  Indefinite   Send INR reminders to:  ANTICOAG LVAD    Indications    Acute on chronic systolic congestive heart failure (H) [I50.23]  LVAD (left ventricular assist device) present (H) [Z95.811]  Chronic systolic congestive heart failure (H) [I50.22]  Long term use of drug [Z79.899]  Left ventricular assist device present (H) [Z95.811]  Anticoagulated on Coumadin [Z79.01]             Comments:  Follow VAD Anticoag protocol:Yes: HeartMate 3    Bridging: Enoxaparin   Date VAD placed: 3/23/23             Anticoagulation Care Providers       Provider Role Specialty Phone number    Kenzie Moreau MD Referring Advanced Heart Failure and Transplant Cardiology [phone]    Mile Bolivar, VERONICA CNP Referring Nurse Practitioner [phone]    Shaun Aguiar MD Referring Cardiovascular Disease [phone]

## 2024-03-01 DIAGNOSIS — Z95.811 LVAD (LEFT VENTRICULAR ASSIST DEVICE) PRESENT (H): ICD-10-CM

## 2024-03-02 LAB — INR HOME MONITORING: 2.3 (ref 2–3)

## 2024-03-04 ENCOUNTER — ANTICOAGULATION THERAPY VISIT (OUTPATIENT)
Dept: ANTICOAGULATION | Facility: CLINIC | Age: 59
End: 2024-03-04
Payer: COMMERCIAL

## 2024-03-04 DIAGNOSIS — Z95.811 LVAD (LEFT VENTRICULAR ASSIST DEVICE) PRESENT (H): ICD-10-CM

## 2024-03-04 DIAGNOSIS — Z95.811 LEFT VENTRICULAR ASSIST DEVICE PRESENT (H): ICD-10-CM

## 2024-03-04 DIAGNOSIS — Z79.01 ANTICOAGULATED ON COUMADIN: ICD-10-CM

## 2024-03-04 DIAGNOSIS — I50.22 CHRONIC SYSTOLIC CONGESTIVE HEART FAILURE (H): ICD-10-CM

## 2024-03-04 DIAGNOSIS — Z79.899 LONG TERM USE OF DRUG: ICD-10-CM

## 2024-03-04 DIAGNOSIS — I50.23 ACUTE ON CHRONIC SYSTOLIC CONGESTIVE HEART FAILURE (H): Primary | ICD-10-CM

## 2024-03-04 NOTE — PROGRESS NOTES
ANTICOAGULATION MANAGEMENT     Akshat Fragoso 58 year old male is on warfarin with therapeutic INR result. (Goal INR 2.0-3.0)    Recent labs: (last 7 days)     03/02/24  0000   INR 2.3       ASSESSMENT     Source(s): Chart Review  Previous INR was Therapeutic last 2(+) visits  Medication, diet, health changes since last INR chart reviewed; none identified  Right heart cath 3/27/24         PLAN     Recommended plan for no diet, medication or health factor changes affecting INR     Dosing Instructions: Continue your current warfarin dose with next INR in 2 weeks       Summary  As of 3/4/2024      Full warfarin instructions:  5 mg every Mon, Wed, Fri; 2.5 mg all other days   Next INR check:  3/15/2024               Detailed voice message left for Akshat with dosing instructions and follow up date.   Sent [employer] message with dosing and follow up instructions    Patient to recheck with home meter    Education provided:   Please call back if any changes to your diet, medications or how you've been taking warfarin  Goal range and lab monitoring: goal range and significance of current result and Importance of following up at instructed interval  Importance of notifying anticoagulation clinic for: health changes  Written instructions provided  [facility] is open Mon-Fri, not open Saturdays/Sundays.    Plan made per [facility] anticoagulation protocol and per LVAD protocol    Justina Wilkins RN  Anticoagulation Clinic  3/4/2024    _______________________________________________________________________     Anticoagulation Episode Summary       Current INR goal:  2.0-3.0   TTR:  77.0% (10.7 mo)   Target end date:  Indefinite   Send INR reminders to:  ANTICOAG LVAD    Indications    Acute on chronic systolic congestive heart failure (H) [I50.23]  LVAD (left ventricular assist device) present (H) [Z95.811]  Chronic systolic congestive heart failure (H) [I50.22]  Long term use of drug [Z79.899]  Left ventricular assist device present (H)  [Z95.811]  Anticoagulated on Coumadin [Z79.01]             Comments:  Follow VAD Anticoag protocol:Yes: HeartMate 3   Bridging: Enoxaparin   Date VAD placed: 3/23/23             Anticoagulation Care Providers       Provider Role Specialty Phone number    Kenzie Moreau MD Referring Advanced Heart Failure and Transplant Cardiology [phone]    Mile Bolivar, APRN CNP Referring Nurse Practitioner [phone]    Shaun Aguiar MD Referring Cardiovascular Disease [phone]

## 2024-03-07 NOTE — TELEPHONE ENCOUNTER
"  BUMETANIDE 1MG TABS  TAKE 1 TABLET (1MG) BY MOUTH  AS NEEDED FOR SWELLING IN LOWER EXTREMITIES OR SHORTNESS OF BREATH. PLEASE LET YOUR COORDINATOR KNOW IF YOU TAKE A DOSE.   Last Written Prescription Date:  10/20/23  Last Fill Quantity: 30,   # refills: 0  Last Office Visit : 12/27/23  Future Office visit:  4/2/24    Routing refill request to provider for review/approval because:  Okay for refill? Per 12/27/23 April note: \"Bumex 1 mg PRN- has taken a few times, took it a few days ago. Uses it a couple times a month\"   Comment on SIG: TAKE 1 TABLET (1MG) BY MOUTH  AS NEEDED FOR SWELLING IN LOWER EXTREMITIES OR SHORTNESS OF BREATH. PLEASE LET YOUR COORDINATOR KNOW IF YOU TAKE A DOSE.       "

## 2024-03-12 ENCOUNTER — MYC MEDICAL ADVICE (OUTPATIENT)
Dept: OTHER | Age: 59
End: 2024-03-12

## 2024-03-13 ENCOUNTER — MYC REFILL (OUTPATIENT)
Dept: CARDIOLOGY | Facility: CLINIC | Age: 59
End: 2024-03-13
Payer: COMMERCIAL

## 2024-03-13 DIAGNOSIS — Z95.811 LVAD (LEFT VENTRICULAR ASSIST DEVICE) PRESENT (H): ICD-10-CM

## 2024-03-13 RX ORDER — BUMETANIDE 1 MG/1
TABLET ORAL
Qty: 30 TABLET | Refills: 0 | OUTPATIENT
Start: 2024-03-13

## 2024-03-13 RX ORDER — BUMETANIDE 1 MG/1
TABLET ORAL
Qty: 30 TABLET | Refills: 0 | Status: SHIPPED | OUTPATIENT
Start: 2024-03-13 | End: 2024-04-02

## 2024-03-18 LAB — INR HOME MONITORING: 2.5 (ref 2–3)

## 2024-03-19 ENCOUNTER — ANTICOAGULATION THERAPY VISIT (OUTPATIENT)
Dept: ANTICOAGULATION | Facility: CLINIC | Age: 59
End: 2024-03-19
Payer: COMMERCIAL

## 2024-03-19 DIAGNOSIS — Z79.899 LONG TERM USE OF DRUG: ICD-10-CM

## 2024-03-19 DIAGNOSIS — Z79.01 ANTICOAGULATED ON COUMADIN: ICD-10-CM

## 2024-03-19 DIAGNOSIS — Z95.811 LEFT VENTRICULAR ASSIST DEVICE PRESENT (H): ICD-10-CM

## 2024-03-19 DIAGNOSIS — Z95.811 LVAD (LEFT VENTRICULAR ASSIST DEVICE) PRESENT (H): ICD-10-CM

## 2024-03-19 DIAGNOSIS — I50.23 ACUTE ON CHRONIC SYSTOLIC CONGESTIVE HEART FAILURE (H): Primary | ICD-10-CM

## 2024-03-19 DIAGNOSIS — I50.22 CHRONIC SYSTOLIC CONGESTIVE HEART FAILURE (H): ICD-10-CM

## 2024-03-19 NOTE — PROGRESS NOTES
ANTICOAGULATION MANAGEMENT     Akshat Fragoso 58 year old male is on warfarin with therapeutic INR result. (Goal INR 2.0-3.0)    Recent labs: (last 7 days)     03/17/24  0000   INR 2.5       ASSESSMENT     Source(s): Chart Review  Previous INR was Therapeutic last 2(+) visits  Medication, diet, health changes since last INR chart reviewed; none identified  Right heart cath 3/27/24       PLAN     Recommended plan for no diet, medication or health factor changes affecting INR     Dosing Instructions: Continue your current warfarin dose with next INR in 2 weeks       Summary  As of 3/19/2024      Full warfarin instructions:  5 mg every Mon, Wed, Fri; 2.5 mg all other days   Next INR check:  4/1/2024               Detailed voice message left for Akshat with dosing instructions and follow up date.     Patient to recheck with home meter    Education provided:   Please call back if any changes to your diet, medications or how you've been taking warfarin  Goal range and lab monitoring: goal range and significance of current result and Importance of following up at instructed interval  Contact [phone] with any changes, questions or concerns.     Plan made per [facility] anticoagulation protocol and per LVAD protocol    TREV LYN RN  Anticoagulation Clinic  3/19/2024    _______________________________________________________________________     Anticoagulation Episode Summary       Current INR goal:  2.0-3.0   TTR:  78.0% (11.2 mo)   Target end date:  Indefinite   Send INR reminders to:  ANTICOAG LVAD    Indications    Acute on chronic systolic congestive heart failure (H) [I50.23]  LVAD (left ventricular assist device) present (H) [Z95.811]  Chronic systolic congestive heart failure (H) [I50.22]  Long term use of drug [Z79.899]  Left ventricular assist device present (H) [Z95.811]  Anticoagulated on Coumadin [Z79.01]             Comments:  Follow VAD Anticoag protocol:Yes: HeartMate 3   Bridging: Enoxaparin   Date VAD placed:  3/23/23             Anticoagulation Care Providers       Provider Role Specialty Phone number    Kenzie Moreau MD Referring Advanced Heart Failure and Transplant Cardiology [phone]    Mile Bolivar, APRN CNP Referring Nurse Practitioner [phone]    Shaun Aguiar MD Referring Cardiovascular Disease [phone]

## 2024-03-26 DIAGNOSIS — Z95.811 LEFT VENTRICULAR ASSIST DEVICE PRESENT (H): ICD-10-CM

## 2024-03-26 DIAGNOSIS — Z79.899 LONG TERM USE OF DRUG: Primary | ICD-10-CM

## 2024-03-26 DIAGNOSIS — I50.22 CHRONIC SYSTOLIC CONGESTIVE HEART FAILURE (H): ICD-10-CM

## 2024-03-26 DIAGNOSIS — I50.22 CHRONIC SYSTOLIC HEART FAILURE (H): ICD-10-CM

## 2024-04-01 ENCOUNTER — ANTICOAGULATION THERAPY VISIT (OUTPATIENT)
Dept: ANTICOAGULATION | Facility: CLINIC | Age: 59
End: 2024-04-01

## 2024-04-01 ENCOUNTER — HOSPITAL ENCOUNTER (OUTPATIENT)
Facility: CLINIC | Age: 59
Discharge: HOME | End: 2024-04-01
Attending: INTERNAL MEDICINE | Admitting: INTERNAL MEDICINE
Payer: COMMERCIAL

## 2024-04-01 ENCOUNTER — APPOINTMENT (OUTPATIENT)
Dept: LAB | Facility: CLINIC | Age: 59
End: 2024-04-01
Attending: INTERNAL MEDICINE
Payer: COMMERCIAL

## 2024-04-01 ENCOUNTER — APPOINTMENT (OUTPATIENT)
Dept: MEDSURG UNIT | Facility: CLINIC | Age: 59
End: 2024-04-01
Attending: INTERNAL MEDICINE
Payer: COMMERCIAL

## 2024-04-01 VITALS
DIASTOLIC BLOOD PRESSURE: 89 MMHG | RESPIRATION RATE: 18 BRPM | HEART RATE: 82 BPM | SYSTOLIC BLOOD PRESSURE: 104 MMHG | HEIGHT: 67 IN | WEIGHT: 195.55 LBS | BODY MASS INDEX: 30.69 KG/M2 | TEMPERATURE: 98.4 F | OXYGEN SATURATION: 96 %

## 2024-04-01 DIAGNOSIS — I50.22 CHRONIC SYSTOLIC HEART FAILURE (H): ICD-10-CM

## 2024-04-01 DIAGNOSIS — Z95.811 LEFT VENTRICULAR ASSIST DEVICE PRESENT (H): ICD-10-CM

## 2024-04-01 DIAGNOSIS — Z95.811 LVAD (LEFT VENTRICULAR ASSIST DEVICE) PRESENT (H): ICD-10-CM

## 2024-04-01 DIAGNOSIS — I50.22 CHRONIC SYSTOLIC CONGESTIVE HEART FAILURE (H): ICD-10-CM

## 2024-04-01 DIAGNOSIS — Z79.899 LONG TERM USE OF DRUG: ICD-10-CM

## 2024-04-01 DIAGNOSIS — Z79.01 ANTICOAGULATED ON COUMADIN: ICD-10-CM

## 2024-04-01 DIAGNOSIS — I50.23 ACUTE ON CHRONIC SYSTOLIC CONGESTIVE HEART FAILURE (H): Primary | ICD-10-CM

## 2024-04-01 LAB
ALBUMIN SERPL BCG-MCNC: 4.6 G/DL (ref 3.5–5.2)
ALP SERPL-CCNC: 88 U/L (ref 40–150)
ALT SERPL W P-5'-P-CCNC: 37 U/L (ref 0–70)
ANION GAP SERPL CALCULATED.3IONS-SCNC: 12 MMOL/L (ref 7–15)
AST SERPL W P-5'-P-CCNC: 37 U/L (ref 0–45)
BASOPHILS # BLD AUTO: 0.1 10E3/UL (ref 0–0.2)
BASOPHILS NFR BLD AUTO: 1 %
BILIRUB SERPL-MCNC: 0.5 MG/DL
BUN SERPL-MCNC: 22.5 MG/DL (ref 6–20)
CALCIUM SERPL-MCNC: 9.4 MG/DL (ref 8.6–10)
CHLORIDE SERPL-SCNC: 98 MMOL/L (ref 98–107)
CREAT SERPL-MCNC: 1.37 MG/DL (ref 0.67–1.17)
DEPRECATED HCO3 PLAS-SCNC: 27 MMOL/L (ref 22–29)
DIGOXIN SERPL-MCNC: 2.5 NG/ML (ref 0.6–2)
EGFRCR SERPLBLD CKD-EPI 2021: 61 ML/MIN/1.73M2
EOSINOPHIL # BLD AUTO: 0.1 10E3/UL (ref 0–0.7)
EOSINOPHIL NFR BLD AUTO: 1 %
ERYTHROCYTE [DISTWIDTH] IN BLOOD BY AUTOMATED COUNT: 16.3 % (ref 10–15)
FERRITIN SERPL-MCNC: 39 NG/ML (ref 31–409)
GLUCOSE SERPL-MCNC: 110 MG/DL (ref 70–99)
HCT VFR BLD AUTO: 46 % (ref 40–53)
HGB BLD-MCNC: 14.2 G/DL (ref 13.3–17.7)
IMM GRANULOCYTES # BLD: 0.1 10E3/UL
IMM GRANULOCYTES NFR BLD: 1 %
INR PPP: 2.02 (ref 0.85–1.15)
IRON BINDING CAPACITY (ROCHE): 393 UG/DL (ref 240–430)
IRON SATN MFR SERPL: 10 % (ref 15–46)
IRON SERPL-MCNC: 39 UG/DL (ref 61–157)
LDH SERPL L TO P-CCNC: 362 U/L (ref 0–250)
LYMPHOCYTES # BLD AUTO: 1.8 10E3/UL (ref 0.8–5.3)
LYMPHOCYTES NFR BLD AUTO: 17 %
MCH RBC QN AUTO: 26.9 PG (ref 26.5–33)
MCHC RBC AUTO-ENTMCNC: 30.9 G/DL (ref 31.5–36.5)
MCV RBC AUTO: 87 FL (ref 78–100)
MONOCYTES # BLD AUTO: 0.9 10E3/UL (ref 0–1.3)
MONOCYTES NFR BLD AUTO: 9 %
NEUTROPHILS # BLD AUTO: 7.6 10E3/UL (ref 1.6–8.3)
NEUTROPHILS NFR BLD AUTO: 71 %
NRBC # BLD AUTO: 0 10E3/UL
NRBC BLD AUTO-RTO: 0 /100
NT-PROBNP SERPL-MCNC: 2835 PG/ML (ref 0–900)
PLATELET # BLD AUTO: 220 10E3/UL (ref 150–450)
POTASSIUM SERPL-SCNC: 5.1 MMOL/L (ref 3.4–5.3)
PROT SERPL-MCNC: 7.3 G/DL (ref 6.4–8.3)
RBC # BLD AUTO: 5.28 10E6/UL (ref 4.4–5.9)
SODIUM SERPL-SCNC: 137 MMOL/L (ref 135–145)
TRANSFERRIN SERPL-MCNC: 322 MG/DL (ref 200–360)
WBC # BLD AUTO: 10.6 10E3/UL (ref 4–11)

## 2024-04-01 PROCEDURE — 83615 LACTATE (LD) (LDH) ENZYME: CPT | Performed by: INTERNAL MEDICINE

## 2024-04-01 PROCEDURE — C1751 CATH, INF, PER/CENT/MIDLINE: HCPCS | Performed by: INTERNAL MEDICINE

## 2024-04-01 PROCEDURE — 83550 IRON BINDING TEST: CPT | Performed by: INTERNAL MEDICINE

## 2024-04-01 PROCEDURE — 250N000009 HC RX 250: Performed by: INTERNAL MEDICINE

## 2024-04-01 PROCEDURE — 84238 ASSAY NONENDOCRINE RECEPTOR: CPT | Performed by: INTERNAL MEDICINE

## 2024-04-01 PROCEDURE — 272N000001 HC OR GENERAL SUPPLY STERILE: Performed by: INTERNAL MEDICINE

## 2024-04-01 PROCEDURE — G0480 DRUG TEST DEF 1-7 CLASSES: HCPCS | Performed by: INTERNAL MEDICINE

## 2024-04-01 PROCEDURE — 80162 ASSAY OF DIGOXIN TOTAL: CPT | Performed by: INTERNAL MEDICINE

## 2024-04-01 PROCEDURE — 80323 ALKALOIDS NOS: CPT | Performed by: INTERNAL MEDICINE

## 2024-04-01 PROCEDURE — 93451 RIGHT HEART CATH: CPT | Mod: 26 | Performed by: INTERNAL MEDICINE

## 2024-04-01 PROCEDURE — 84466 ASSAY OF TRANSFERRIN: CPT | Performed by: INTERNAL MEDICINE

## 2024-04-01 PROCEDURE — 82728 ASSAY OF FERRITIN: CPT | Performed by: INTERNAL MEDICINE

## 2024-04-01 PROCEDURE — C1894 INTRO/SHEATH, NON-LASER: HCPCS | Performed by: INTERNAL MEDICINE

## 2024-04-01 PROCEDURE — 999N000132 HC STATISTIC PP CARE STAGE 1

## 2024-04-01 PROCEDURE — 83880 ASSAY OF NATRIURETIC PEPTIDE: CPT | Performed by: INTERNAL MEDICINE

## 2024-04-01 PROCEDURE — 80321 ALCOHOLS BIOMARKERS 1OR 2: CPT | Performed by: INTERNAL MEDICINE

## 2024-04-01 PROCEDURE — 85025 COMPLETE CBC W/AUTO DIFF WBC: CPT | Performed by: INTERNAL MEDICINE

## 2024-04-01 PROCEDURE — 999N000142 HC STATISTIC PROCEDURE PREP ONLY

## 2024-04-01 PROCEDURE — 93451 RIGHT HEART CATH: CPT | Performed by: INTERNAL MEDICINE

## 2024-04-01 PROCEDURE — 80053 COMPREHEN METABOLIC PANEL: CPT | Performed by: INTERNAL MEDICINE

## 2024-04-01 PROCEDURE — 36415 COLL VENOUS BLD VENIPUNCTURE: CPT | Performed by: INTERNAL MEDICINE

## 2024-04-01 PROCEDURE — 85610 PROTHROMBIN TIME: CPT | Performed by: INTERNAL MEDICINE

## 2024-04-01 RX ORDER — LIDOCAINE 40 MG/G
CREAM TOPICAL
Status: COMPLETED | OUTPATIENT
Start: 2024-04-01 | End: 2024-04-01

## 2024-04-01 RX ADMIN — LIDOCAINE: 40 CREAM TOPICAL at 10:40

## 2024-04-01 ASSESSMENT — ACTIVITIES OF DAILY LIVING (ADL)
ADLS_ACUITY_SCORE: 37

## 2024-04-01 NOTE — DISCHARGE INSTRUCTIONS
[facility]                        Interventional Cardiology  Discharge Instructions   Post Right Heart Cath       AFTER YOU GO HOME:  DO drink plenty of fluids  DO resume your regular diet and medications unless otherwise instructed by your Primary Physician  Do Not scrub the procedure site vigorously  No lotion or powder to the puncture site for 3 days    CALL YOUR PRIMARY PHYSICIAN IF: You may resume all normal activity.  Monitor neck site for bleeding, swelling, or voice changes. If you notice bleeding or swelling immediately apply pressure to the site and call number below to speak with Cardiology Fellow.  If you experience any changes in your breathing you should call your doctor immediately or come to the closest Emergency Department.  Do not drive yourself.    ADDITIONAL INSTRUCTIONS: Medications: You are to resume all home medications including anticoagulation therapy unless otherwise advised by your primary cardiologist or nurse coordinator.    Follow Up: Per your primary cardiology team    If you have any questions or concerns regarding your procedure site please call [phone] at anytime and ask for Cardiology Fellow on call.  They are available 24 hours a day.  You may also contact the Cardiology Clinic after hours number at [phone].                                                       Telephone Numbers [phone] Monday-Friday 8:00 am to 4:30 pm    [phone] After 4:30 pm Monday-Friday, Weekends & Holidays  Ask for Interventional Cardiologist on call. Someone is on call 24 hours/day   [facility] toll free number [phone] Monday-Friday 8:00 am to 4:30 pm   [facility] Emergency Dept [phone]

## 2024-04-01 NOTE — PROGRESS NOTES
Pt returned from [facility] for RHC. Pt alert and oriented. VSS. Sats >92% on RA. Pt denies any pain. Right neck site WNL. No bleeding or hematoma. Primapore in place. Ambulating in room. Pt declined any oral intake or liquids. Discharge instructions reviewed with pt. Pt verbalized understanding. Copy given to patient. Pt ambulated independently off unit. Pt's spouse Landry transported patient home.

## 2024-04-01 NOTE — PROGRESS NOTES
Pt prepped for RHC. Pt alert and oriented. VSS. Sats >92% on RA. Pt denies any pain. Pt took Warfarin last night. NP aware. Jardiance also taken this morning. Lidocaine applied on right neck site. Awaiting labs. Consent signed.

## 2024-04-01 NOTE — PROGRESS NOTES
ANTICOAGULATION MANAGEMENT     Akshat Fragoso 58 year old male is on warfarin with therapeutic INR result. (Goal INR 2.0-3.0)    Recent labs: (last 7 days)     04/01/24  1010   INR 2.02*       ASSESSMENT     Source(s): Chart Review     Warfarin doses taken: Reviewed in chart  Diet: No new diet changes identified  Medication/supplement changes: None noted  New illness, injury, or hospitalization: Yes: RHC today   Signs or symptoms of bleeding or clotting: No  Previous result: Therapeutic last 2(+) visits  Additional findings: None       PLAN     Recommended plan for no diet, medication or health factor changes affecting INR     Dosing Instructions: Continue your current warfarin dose with next INR in 2 weeks       Summary  As of 4/1/2024      Full warfarin instructions:  5 mg every Mon, Wed, Fri; 2.5 mg all other days   Next INR check:  4/15/2024               Detailed voice message left for Akshat with dosing instructions and follow up date.     Patient to recheck with home meter    Education provided:   Please call back if any changes to your diet, medications or how you've been taking warfarin  Contact [phone] with any changes, questions or concerns.     Plan made per [facility] anticoagulation protocol and per LVAD protocol    Merry Reyes RN  Anticoagulation Clinic  4/1/2024    _______________________________________________________________________     Anticoagulation Episode Summary       Current INR goal:  2.0-3.0   TTR:  79.0% (11.7 mo)   Target end date:  Indefinite   Send INR reminders to:  ANTICOAG LVAD    Indications    Acute on chronic systolic congestive heart failure (H) [I50.23]  LVAD (left ventricular assist device) present (H) [Z95.811]  Chronic systolic congestive heart failure (H) [I50.22]  Long term use of drug [Z79.899]  Left ventricular assist device present (H) [Z95.811]  Anticoagulated on Coumadin [Z79.01]             Comments:  Follow VAD Anticoag protocol:Yes: HeartMate 3   Bridging:  Enoxaparin   Date VAD placed: 3/23/23             Anticoagulation Care Providers       Provider Role Specialty Phone number    Kenzie Moreau MD Referring Advanced Heart Failure and Transplant Cardiology [phone]    Mile Bolivar, VERONICA CNP Referring Nurse Practitioner [phone]    Shaun Aguiar MD Referring Cardiovascular Disease [phone]

## 2024-04-02 ENCOUNTER — OFFICE VISIT (OUTPATIENT)
Dept: CARDIOLOGY | Facility: CLINIC | Age: 59
End: 2024-04-02
Attending: INTERNAL MEDICINE
Payer: COMMERCIAL

## 2024-04-02 VITALS
SYSTOLIC BLOOD PRESSURE: 86 MMHG | WEIGHT: 194.9 LBS | BODY MASS INDEX: 30.53 KG/M2 | HEART RATE: 99 BPM | OXYGEN SATURATION: 99 %

## 2024-04-02 DIAGNOSIS — N52.9 ERECTILE DYSFUNCTION, UNSPECIFIED ERECTILE DYSFUNCTION TYPE: ICD-10-CM

## 2024-04-02 DIAGNOSIS — Z79.899 LONG TERM USE OF DRUG: Primary | ICD-10-CM

## 2024-04-02 DIAGNOSIS — I50.22 CHRONIC SYSTOLIC (CONGESTIVE) HEART FAILURE (H): ICD-10-CM

## 2024-04-02 DIAGNOSIS — I50.22 CHRONIC SYSTOLIC CONGESTIVE HEART FAILURE (H): ICD-10-CM

## 2024-04-02 DIAGNOSIS — Z95.810 ICD (IMPLANTABLE CARDIOVERTER-DEFIBRILLATOR) IN PLACE: ICD-10-CM

## 2024-04-02 DIAGNOSIS — Z95.811 LEFT VENTRICULAR ASSIST DEVICE PRESENT (H): ICD-10-CM

## 2024-04-02 DIAGNOSIS — Z95.811 LVAD (LEFT VENTRICULAR ASSIST DEVICE) PRESENT (H): ICD-10-CM

## 2024-04-02 LAB — STFR SERPL-MCNC: 5.6 MG/L

## 2024-04-02 PROCEDURE — 99215 OFFICE O/P EST HI 40 MIN: CPT | Mod: 24 | Performed by: INTERNAL MEDICINE

## 2024-04-02 PROCEDURE — 93750 INTERROGATION VAD IN PERSON: CPT | Performed by: INTERNAL MEDICINE

## 2024-04-02 PROCEDURE — G0463 HOSPITAL OUTPT CLINIC VISIT: HCPCS | Mod: 25 | Performed by: INTERNAL MEDICINE

## 2024-04-02 RX ORDER — SILDENAFIL 50 MG/1
50 TABLET, FILM COATED ORAL DAILY PRN
Qty: 30 TABLET | Refills: 0 | Status: SHIPPED | OUTPATIENT
Start: 2024-04-02

## 2024-04-02 RX ORDER — BUMETANIDE 1 MG/1
1 TABLET ORAL EVERY OTHER DAY
Qty: 45 TABLET | Refills: 3 | Status: SHIPPED | OUTPATIENT
Start: 2024-04-02 | End: 2024-09-26

## 2024-04-02 ASSESSMENT — PAIN SCALES - GENERAL: PAINLEVEL: NO PAIN (0)

## 2024-04-02 NOTE — PROGRESS NOTES
"[facility] Metrics    Blood Pressure   {BP < 140/90:2219368::\"BP < 140/90 Yes\"}    On Aspirin  {On Aspirin Yes/No:1561874::\"Yes\"}    On Statin  {On Statin Yes/No:9344962::\"Yes\"}    Tobacco use  {Tobacco use Yes/No:6925133::\"No\"}    "
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
## 2024-04-02 NOTE — NURSING NOTE
Chief Complaint   Patient presents with    Follow Up     Return VAD     Vitals were taken and medications reconciled.    Laura Murillo CNA  9:07 AM

## 2024-04-02 NOTE — NURSING NOTE
04/02/24 0900   [facility] VAD Information   Implant LVAD   LVAD Pump HeartMate 3   Heartmate 3 LEFT VS   Flow (Lpm) 4.2 Lpm   Pulse Index (PI) 3.8 PI  (Range 2.0 - 6.4)   Speed (rpm) 5100 rpm   Power (mari) 3.6 mari   Current Hct setting 46  (updated)   Primary Controller   Serial number [id number]   Low flow alarm setting 2.5   EBB: Patient use 12   Replace in 17 Months   Backup Controller   Serial number [id number]   EBB: Patient use 4   Replace EBB in 18 Months   VAD Interrogation   Alarms reported by patient N   Unexpected alarms noted upon interrogation None   PI events Frequent   Damage to equipment is noted N   Action taken Reviewed proper equipment care and maintenance   Driveline Exit Site   Dressing change done N   Driveline properly secured Yes   DLES assessment c/d/i per pt report   Dressing used Daily kit   Frequency patient changes dressing Every other day     Education Complete: Yes   Charge the BACKUP controller s backup battery every 6 months  Perform a self test on BACKUP every 6 months  Change the MPU s batteries every 6 months:Yes  Have equipment serviced yearly (if applicable):Yes (PATIENT WAS DUE FOR EQUIPMENT MAINTENANCE TODAY AND FORGET TO BRING IT.  PT PLANNING TO BRING IT TO NEXT APPT)    Reviewed causes of electrostatic discharge (ESD)  Reviewed ESD prevention.  Handout on ESD given.
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
## 2024-04-02 NOTE — PATIENT INSTRUCTIONS
"Medications:  START taking Bumex 1mg every other day  START Sildenafil 50mg as needed. (Primary care team to provide refills and manage ED going forward)    Instructions:  Bring your battery charger to your next appt to be serviced.    Labs ([facility]) in 2 weeks, to re-check your kidney function and potassium level  Let Rosita know if and when you would like to initiate your transplant workup testing    Follow-up: (make these appointments before you leave)  1. Please follow-up with VAD RABIA in June, previously arranged with labs prior.  2. Please follow-up with Dr. Aguiar in September with labs prior.   3.  Please cancel May appointment with Dr. Aguiar and the lab scheduled with it.    Equipment Maintenance Reminders:   Charge your back-up controller at least every 6 months. To charge, connect it to either batteries or wall power. The screen will read \"Charging\". Keep connected until the screen reads \"charging complete\". Disconnect power once the controller battery is charged. Also do a self-test when the controller is connected to power.  Check your battery charger for when it is due for maintenance. It requires inspection in clinic once per year. There will be a sticker stating the month and year maintenance is due. When you bring your battery charger to clinic, tell one of the schedulers you have LVAD equipment that needs maintenance. They will call [employer]. You can leave your  under the LVAD sign by the  at the far end of clinic. You must drop it off before 2pm.   Replace the AA batteries in your mobile power unit every 6 months.       Page the VAD Coordinator on call if you gain more than 3 lb in a day or 5 in a week. Please also page if you feel unwell or have alarms.   Great to see you in clinic today. To Page the VAD Coordinator on call, dial [phone] option #4 and ask to speak to the VAD coordinator on call.     "

## 2024-04-02 NOTE — NURSING NOTE
Pt speed increased per MD Aguiar     04/02/24 0945   Heartmate 3 LEFT VS   Flow (Lpm) 4.2 Lpm   Pulse Index (PI) 4.4 PI   Speed (rpm) 5200 rpm   Power (mari) 3.7 mari

## 2024-04-03 LAB
COTININE SERPL-MCNC: <5 NG/ML
LABORATORY REPORT: NORMAL
NICOTINE SERPL-MCNC: <5 NG/ML
PETH INTERPRETATION: NORMAL
PLPETH BLD-MCNC: <10 NG/ML
POPETH BLD-MCNC: <10 NG/ML

## 2024-04-04 ENCOUNTER — MYC MEDICAL ADVICE (OUTPATIENT)
Dept: CARDIOLOGY | Facility: CLINIC | Age: 59
End: 2024-04-04
Payer: COMMERCIAL

## 2024-04-05 ENCOUNTER — CARE COORDINATION (OUTPATIENT)
Dept: CARDIOLOGY | Facility: CLINIC | Age: 59
End: 2024-04-05
Payer: COMMERCIAL

## 2024-04-05 NOTE — PROGRESS NOTES
1202  D:  Pt returned call.  Pt informed writer that he is having some occasional, mild, lightheadedness and dizziness with position changes and has had increased urine output since appointment on Tuesday.  Pt reporting stable VAD parameters since speed increase.  Last two home weights: 188 Wednesday and 190 yesterday.  I:  Informed pt that those symptoms can accompany [facility], but should improve.  Advised pt to continue with plan of care as ordered since weights have not come down. Instructed him to notify oncall coordinator if symptoms worsen or they affect his ability to complete adl's.  Instructed pt to monitor vad parameters, weights and symptoms closely over the weekend and call writer to discuss them on Monday.  Pt agreeable.  A:  clinic follow up  P:  Pt verbalized understanding of the instructions given.  Will call VAD coordinator with further needs and questions.

## 2024-04-05 NOTE — PROGRESS NOTES
Called pt to discuss message regarding change in symptoms.  Pt did not answer, voicemail left requesting return call.  Will await return call.

## 2024-04-07 LAB — INR HOME MONITORING: 2.3 (ref 2–3)

## 2024-04-08 ENCOUNTER — CARE COORDINATION (OUTPATIENT)
Dept: CARDIOLOGY | Facility: CLINIC | Age: 59
End: 2024-04-08
Payer: COMMERCIAL

## 2024-04-08 ENCOUNTER — ANTICOAGULATION THERAPY VISIT (OUTPATIENT)
Dept: ANTICOAGULATION | Facility: CLINIC | Age: 59
End: 2024-04-08
Payer: COMMERCIAL

## 2024-04-08 DIAGNOSIS — Z95.811 LVAD (LEFT VENTRICULAR ASSIST DEVICE) PRESENT (H): ICD-10-CM

## 2024-04-08 DIAGNOSIS — Z79.899 LONG TERM USE OF DRUG: ICD-10-CM

## 2024-04-08 DIAGNOSIS — Z95.811 LEFT VENTRICULAR ASSIST DEVICE PRESENT (H): ICD-10-CM

## 2024-04-08 DIAGNOSIS — Z79.01 ANTICOAGULATED ON COUMADIN: ICD-10-CM

## 2024-04-08 DIAGNOSIS — Z79.899 ON AMIODARONE THERAPY: Primary | ICD-10-CM

## 2024-04-08 DIAGNOSIS — I50.22 CHRONIC SYSTOLIC CONGESTIVE HEART FAILURE (H): ICD-10-CM

## 2024-04-08 DIAGNOSIS — I50.23 ACUTE ON CHRONIC SYSTOLIC CONGESTIVE HEART FAILURE (H): Primary | ICD-10-CM

## 2024-04-08 NOTE — PROGRESS NOTES
ANTICOAGULATION MANAGEMENT     Akshat Fragoso 58 year old male is on warfarin with therapeutic INR result. (Goal INR 2.0-3.0)    Recent labs: (last 7 days)     04/05/24  0000   INR 2.3       ASSESSMENT     Source(s): Chart Review and Patient/Caregiver Call     Warfarin doses taken: Warfarin taken as instructed  Diet: No new diet changes identified  Medication/supplement changes:   4/2/24 Cards OV:   START taking Bumex 1mg every other day-Loop Diuretics may diminish the anticoagulant effect of Vitamin K Antagonists. Severity Minor    START Sildenafil 50mg as needed. (Primary care team to provide refills and manage ED going forward)-No known interaction with warfarin  New illness, injury, or hospitalization: No  Signs or symptoms of bleeding or clotting: No  Previous result: Therapeutic last 2(+) visits  Additional findings: None       PLAN     Recommended plan for ongoing change(s) (medication changes) affecting INR     Dosing Instructions: Continue your current warfarin dose with next INR in 2 weeks       Summary  As of 4/8/2024      Full warfarin instructions:  5 mg every Mon, Wed, Fri; 2.5 mg all other days   Next INR check:  4/19/2024               Telephone call with Akshat who agrees to plan and repeated back plan correctly    Patient to recheck with home meter    Education provided:   Goal range and lab monitoring: goal range and significance of current result and Importance of following up at instructed interval  Contact [phone] with any changes, questions or concerns.     Plan made per [facility] anticoagulation protocol and per LVAD protocol    TREV LYN RN  Anticoagulation Clinic  4/8/2024    _______________________________________________________________________     Anticoagulation Episode Summary       Current INR goal:  2.0-3.0   TTR:  79.2% (11.8 mo)   Target end date:  Indefinite   Send INR reminders to:  ANTICOAG LVAD    Indications    Acute on chronic systolic congestive heart failure (H)  [I50.23]  LVAD (left ventricular assist device) present (H) [Z95.811]  Chronic systolic congestive heart failure (H) [I50.22]  Long term use of drug [Z79.899]  Left ventricular assist device present (H) [Z95.811]  Anticoagulated on Coumadin [Z79.01]             Comments:  Follow VAD Anticoag protocol:Yes: HeartMate 3   Bridging: Enoxaparin   Date VAD placed: 3/23/23             Anticoagulation Care Providers       Provider Role Specialty Phone number    Kenzie Moreau MD Referring Advanced Heart Failure and Transplant Cardiology [phone]    Mile Bolivar, VERONICA CNP Referring Nurse Practitioner [phone]    Shaun Aguiar MD Referring Cardiovascular Disease [phone]

## 2024-04-08 NOTE — PROGRESS NOTES
"D:  Pt called to report continued visual disturbances that started the day his speed was increased and have continued a couple times a day.  Pt states that the episodes happen randomly, affect his depth perception, last a couple of minutes and resolve on their own.  Pt denies all other stroke like symptoms.  Pt reports that this happened \"right after implant, when my speed was set at 5200\".  He states \"I just don't feel as well at this speed.\"  Pt incoming to clinic for EP appt this week and is wondering if we should turn his speed back down to 5100.  Current LVAD numbers, Speed: 5200, Flow: 4.8, PI: 2.9, Power: 3.8, & weight 191 lbs.  I:  Discussed situation and request with Dr. Aguiar.  Plan: pt speed to be returned to previous 5100.  Instructed pt to monitor symptoms closely for improvement or worsening and to notify us prn.  A:  Vision disturbances  P:  Pt verbalized understanding of the instructions given.  Will call VAD coordinator with further needs and questions.    "

## 2024-04-09 NOTE — PROGRESS NOTES
ELECTROPHYSIOLOGY CLINIC VISIT    Assessment/Recommendations   Assessment/Plan:    Mr. Fragoso is a 58 year old medical history significant for NICM LVEF 12%, VT/VF arrest on 3/15/2023, s/p DT LVAD 3/23/23, moderate TV annuloplasty  with 32 mm MC3 partial ring and PFO closure 3/23/2023, s/p ICD 6/8/17, HTN, COPD, CKD III, and chronic tobacco/marijuana use.    NICM s/p DT LVAD 3/2023 LVEF 12%  RV lead dysfunction:  1. ACEi/ARB/ARNi: Continue Lisinopril.  2. BB: Not on d/t RV dysfunction.   3. Aldosterone antagonist: Continue Eplerenone.  4. STLG2i: Continue Jardiance.  5.  SCD prophylaxis: s/p ICD 2017. He has now had recent decrease in pacing impedance after LVAD implant with substantial decrease in sensing and gradual increase in pacing threshold. We discussed lead is showing signs of progressive microperforation. We discussed we would recommend extraction and reimplant, which he underwent in Jan '24 with no issues. RV lead measurement look stable at follow up.   6. Fluid status: Continue Bumex  7. Etiology: NICM    Post-op AF after LVAD surgery   Patch monitor in May '23 showed no AF. Also has no recent ventricular arrhythmias. Has been on amiodarone since LVAD. Given lack of recent atrial and/or ventricular arrhythmias, would recommend weaning amiodarone, he can go down to 100 mg qday. Will get updated TSH/LFTs and PFTs in June '24.     Follow up 6 months with EP RABIA.        History of Present Illness/Subjective    Mr. Akshat Fragoso is a 58 year old male who comes in today for EP consultation of RV lead issues.    Mr. Fragoso is a 58 year old medical history significant for NICM LVEF 12%, VT/VF arrest on 3/15/2023, s/p DT LVAD 3/23/23, moderate TV annuloplasty  with 32 mm MC3 partial ring and PFO closure 3/23/2023, s/p ICD 6/8/17, HTN, COPD, CKD III, and chronic tobacco/marijuana use.    In January 2017, he was admitted with worsening JOHNSON and SOB and was found to have NICM. He had reported a 5 year history of  JOHNSON that worsened over the last year. Coronary angiogram showed clean coronaries, and RHC with normal CI, and mildly elevated right and left heart filling pressures. He was started on optimal medical therapy. His JOHNSON/SOB improved with medications. Repeat CMRI shows an LVEF of 12% and RVEF of 30%. He had ICD implanted on 6/8/17. He suffered a VT/VF arrest on 3/15/2023 requiring CPR for 6 mins with cardiogenic shock. Ultimately had HM3 LVAD implantation as DT with concomitant TV annuloplasty and PFO closure on 3/23/2023 c/b postop AF (on amiodarone and digoxin) and HAP. He has now had recent decrease in pacing impedance after LVAD implant with substantial decrease in sensing and gradual increase in pacing threshold.    EP visit 10/27/23: He reports feeling well. He denies chest discomfort, palpitations, abdominal fullness/bloating or peripheral edema, shortness of breath, paroxysmal nocturnal dyspnea, orthopnea, lightheadedness, dizziness, pre-syncope, or syncope. Device interrogation shows normal device function, RV lead impedence in clinic today is 203 ohms., no ventricular arrhythmias, and  <1%. Current cardiac medications include: Bumex, Eplerenone, Digoxin, Lisinopril, Jardiance, ASA, Amiodarone, and Warfarin.     He presents 4/10/2024 for follow up after lead extraction and re-implant of new RV lead. He reports feeling well. He denies chest discomfort, palpitations, abdominal fullness/bloating or peripheral edema, shortness of breath, paroxysmal nocturnal dyspnea, orthopnea, lightheadedness, dizziness, pre-syncope, or syncope. Device interrogation shows normal device function, no ventricular arrhythmias, and  <1%. Current cardiac medications include: Bumex, Eplerenone, Digoxin, Lisinopril, Jardiance, ASA, Amiodarone, and Warfarin.     I have reviewed and updated the patient's Past Medical History, Social History, Family History and Medication List.     Cardiographics (Personally Reviewed) :   5/3/23 Echo:    Interpretation Summary  Please refer to the EPIC report for measurements performed at different LVAD  speed settings.  HM3 at 5200RPM at baseline.  LVIDd 43mm.  Septum normal.  Aortic valve remain closed at baseline.    4/2017 CMR:  IMPRESSION:  1.  Severely dilated left ventricle with severely reduced global  function with a calculated ejection fraction of  12 %.  2.  Moderately dilated right ventricle with moderately reduced global  function with a calculated ejection fraction of 30%.   3.  There is mild mitral and tricuspid regurgitation.  4.  The left atrium is severely dilated. The right atrium is mildly  dilated.  5.  On delayed enhancement imaging, there is extensive  hyperenhancement in multiple non-ischemic patterns: transmural,  mid-myocardial, subendocardial, and epicardial. 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 The extent and  distribution of hyperenhancement are unchanged from the prior study.  6.  Compared with the prior study dated 1/11/2017, right ventricular  function has improved.          Physical Examination   There were no vitals taken for this visit.  Wt Readings from Last 3 Encounters:   04/02/24 88.4 kg (194 lb 14.4 oz)   04/01/24 88.7 kg (195 lb 8.8 oz)   01/04/24 86.1 kg (189 lb 13.1 oz)     General Appearance:   Alert, well-appearing and in no acute distress.   HEENT: Atraumatic, normocephalic. PERRL.  MMM.   Chest/Lungs:   Respirations unlabored.  Lungs are clear to auscultation.   Cardiovascular:   VAD hum. Regular.     Abdomen:  Soft, nontender, nondistended.   Extremities: No cyanosis or clubbing. No edema.    Musculoskeletal: Moves all extremities.  Gait normal.   Skin: Warm, dry, intact.    Neurologic: Mood and affect are appropriate.  Alert and oriented to person, place, time, and situation.          Medications  Allergies   Current Outpatient Medications   Medication Sig Dispense Refill     acetaminophen (TYLENOL) 325 MG tablet Take 2 tablets (650 mg) by  mouth every 4 hours as needed for other (For optimal non-opioid multimodal pain management to improve pain control.) 100 tablet 0     amiodarone (PACERONE) 200 MG tablet Take 1 tablet (200 mg) by mouth daily 30 tablet 11     amoxicillin (AMOXIL) 500 MG tablet Take 4 tablets (2,000 mg) by mouth as needed (Take one hour before dental procedure.) 4 tablet 0     bumetanide (BUMEX) 1 MG tablet Take 1 tablet (1 mg) by mouth every other day 45 tablet 3     digoxin (LANOXIN) 250 MCG tablet Take 1 tablet (250 mcg) by mouth daily 30 tablet 11     empagliflozin (JARDIANCE) 10 MG TABS tablet Take 1 tablet (10 mg) by mouth daily 90 tablet 3     eplerenone (INSPRA) 25 MG tablet Take 12.5mg (1/2 tab) daily 45 tablet 3     lisinopril (ZESTRIL) 5 MG tablet Take 1 tablet (5 mg) by mouth daily 90 tablet 3     pantoprazole (PROTONIX) 20 MG EC tablet Take 1 tablet (20 mg) by mouth every morning (before breakfast) 90 tablet 3     sildenafil (VIAGRA) 50 MG tablet Take 1 tablet (50 mg) by mouth daily as needed (PRN for sexual activity) 30 tablet 0     warfarin ANTICOAGULANT (COUMADIN) 5 MG tablet Take 1 tablet (5 mg) by mouth daily for 120 days Take 5 mg MWF and 2.5 rest of the week 30 tablet 3    Allergies   Allergen Reactions     Codeine      Other reaction(s): GI intolerance, Intolerance-Can't Take         Lab Results (Personally Reviewed)    Chemistry/lipid CBC Cardiac Enzymes/BNP/TSH/INR   Lab Results   Component Value Date    BUN 22.5 (H) 04/01/2024     04/01/2024    CO2 27 04/01/2024     Creatinine   Date Value Ref Range Status   04/01/2024 1.37 (H) 0.67 - 1.17 mg/dL Final   11/20/2019 0.90 0.66 - 1.25 mg/dL Final       Lab Results   Component Value Date    CHOL 111 03/20/2023    HDL 39 (L) 03/20/2023    LDL 55 03/20/2023      Lab Results   Component Value Date    WBC 10.6 04/01/2024    HGB 14.2 04/01/2024    HCT 46.0 04/01/2024    MCV 87 04/01/2024     04/01/2024    Lab Results   Component Value Date    TSH 4.04  06/07/2023    INR 2.3 04/05/2024        The patient states understanding and is agreeable with the plan.   Rob Campbell MD   EP Fellow, PGY-8  [phone]     EP STAFF NOTE  Patient seen and examined and management plan personally reviewed with the patient. I agree with the note above by the CV/EP fellow.  Charles Montgomery MD [facility]  Cardiology - Electrophysiology  Total time spent on patient visit, reviewing notes, imaging, labs, orders, and completing necessary documentation: 45 minutes.  >50% of visit spent on counseling patient and/or coordination of care.

## 2024-04-10 ENCOUNTER — ALLIED HEALTH/NURSE VISIT (OUTPATIENT)
Dept: CARDIOLOGY | Facility: CLINIC | Age: 59
End: 2024-04-10
Attending: INTERNAL MEDICINE
Payer: COMMERCIAL

## 2024-04-10 VITALS — WEIGHT: 189 LBS | SYSTOLIC BLOOD PRESSURE: 80 MMHG | BODY MASS INDEX: 29.6 KG/M2 | OXYGEN SATURATION: 93 %

## 2024-04-10 VITALS — SYSTOLIC BLOOD PRESSURE: 80 MMHG | BODY MASS INDEX: 30.6 KG/M2 | OXYGEN SATURATION: 93 % | WEIGHT: 195.4 LBS

## 2024-04-10 DIAGNOSIS — Z95.811 LVAD (LEFT VENTRICULAR ASSIST DEVICE) PRESENT (H): Primary | ICD-10-CM

## 2024-04-10 DIAGNOSIS — I48.0 PAROXYSMAL ATRIAL FIBRILLATION (H): ICD-10-CM

## 2024-04-10 DIAGNOSIS — I50.22 CHRONIC SYSTOLIC HEART FAILURE (H): ICD-10-CM

## 2024-04-10 DIAGNOSIS — Z95.810 ICD (IMPLANTABLE CARDIOVERTER-DEFIBRILLATOR) IN PLACE: ICD-10-CM

## 2024-04-10 DIAGNOSIS — I42.9 CARDIOMYOPATHY (H): ICD-10-CM

## 2024-04-10 DIAGNOSIS — Z79.899 ON AMIODARONE THERAPY: ICD-10-CM

## 2024-04-10 DIAGNOSIS — I47.20 VENTRICULAR TACHYCARDIA (H): Primary | ICD-10-CM

## 2024-04-10 DIAGNOSIS — Z95.811 LVAD (LEFT VENTRICULAR ASSIST DEVICE) PRESENT (H): ICD-10-CM

## 2024-04-10 PROCEDURE — G0463 HOSPITAL OUTPT CLINIC VISIT: HCPCS | Performed by: INTERNAL MEDICINE

## 2024-04-10 PROCEDURE — 93282 PRGRMG EVAL IMPLANTABLE DFB: CPT | Performed by: INTERNAL MEDICINE

## 2024-04-10 PROCEDURE — 99215 OFFICE O/P EST HI 40 MIN: CPT | Mod: 25 | Performed by: INTERNAL MEDICINE

## 2024-04-10 RX ORDER — AMIODARONE HYDROCHLORIDE 200 MG/1
100 TABLET ORAL DAILY
Qty: 45 TABLET | Refills: 1 | Status: SHIPPED | OUTPATIENT
Start: 2024-04-10 | End: 2024-06-20

## 2024-04-10 ASSESSMENT — PAIN SCALES - GENERAL
PAINLEVEL: NO PAIN (0)
PAINLEVEL: NO PAIN (0)

## 2024-04-10 NOTE — PROGRESS NOTES
"Optimal Vascular Metrics    Blood Pressure   {BP < 140/90:3261606::\"BP < 140/90 Yes\"}    On Aspirin  {On Aspirin Yes/No:0629452::\"Yes\"}    On Statin  {On Statin Yes/No:2516421::\"Yes\"}    Tobacco use  {Tobacco use Yes/No:4587739::\"No\"}    "

## 2024-04-10 NOTE — PROGRESS NOTES
"Optimal Vascular Metrics    Blood Pressure   {BP < 140/90:0892652::\"BP < 140/90 Yes\"}    On Aspirin  {On Aspirin Yes/No:8220166::\"Yes\"}    On Statin  {On Statin Yes/No:0520936::\"Yes\"}    Tobacco use  {Tobacco use Yes/No:0939195::\"No\"}    "

## 2024-04-10 NOTE — LETTER
4/10/2024      RE: Akshat Fragoso  [address]  [facility] [address]       Dear Colleague,    Thank you for the opportunity to participate in the care of your patient, Akshat Fragoso, at the [facility] [place] at [facility]. Please see a copy of my visit note below.        ELECTROPHYSIOLOGY CLINIC VISIT    Assessment/Recommendations   Assessment/Plan:    Mr. Fragoso is a 58 year old medical history significant for NICM LVEF 12%, VT/VF arrest on 3/15/2023, s/p DT LVAD 3/23/23, moderate TV annuloplasty  with 32 mm MC3 partial ring and PFO closure 3/23/2023, s/p ICD 6/8/17, HTN, COPD, CKD III, and chronic tobacco/marijuana use.    NICM s/p DT LVAD 3/2023 LVEF 12%  RV lead dysfunction:  1. ACEi/ARB/ARNi: Continue Lisinopril.  2. BB: Not on d/t RV dysfunction.   3. Aldosterone antagonist: Continue Eplerenone.  4. STLG2i: Continue Jardiance.  5.  SCD prophylaxis: s/p ICD 2017. He has now had recent decrease in pacing impedance after LVAD implant with substantial decrease in sensing and gradual increase in pacing threshold. We discussed lead is showing signs of progressive microperforation. We discussed we would recommend extraction and reimplant, which he underwent in Jan '24 with no issues. RV lead measurement look stable at follow up.   6. Fluid status: Continue Bumex  7. Etiology: NICM    Post-op AF after LVAD surgery   Patch monitor in May '23 showed no AF. Also has no recent ventricular arrhythmias. Has been on amiodarone since LVAD. Given lack of recent atrial and/or ventricular arrhythmias, would recommend weaning amiodarone, he can go down to 100 mg qday. Will get updated TSH/LFTs and PFTs in June '24.     Follow up 6 months with EP RABIA.        History of Present Illness/Subjective    Mr. Akshat Fragoso is a 58 year old male who comes in today for EP consultation of RV lead issues.    Mr. Fragoso is a 58 year old medical history significant for  NICM LVEF 12%, VT/VF arrest on 3/15/2023, s/p DT LVAD 3/23/23, moderate TV annuloplasty  with 32 mm MC3 partial ring and PFO closure 3/23/2023, s/p ICD 6/8/17, HTN, COPD, CKD III, and chronic tobacco/marijuana use.    In January 2017, he was admitted with worsening JOHNSON and SOB and was found to have NICM. He had reported a 5 year history of JOHNSON that worsened over the last year. Coronary angiogram showed clean coronaries, and RHC with normal CI, and mildly elevated right and left heart filling pressures. He was started on optimal medical therapy. His JOHNSON/SOB improved with medications. Repeat CMRI shows an LVEF of 12% and RVEF of 30%. He had ICD implanted on 6/8/17. He suffered a VT/VF arrest on 3/15/2023 requiring CPR for 6 mins with cardiogenic shock. Ultimately had HM3 LVAD implantation as DT with concomitant TV annuloplasty and PFO closure on 3/23/2023 c/b postop AF (on amiodarone and digoxin) and HAP. He has now had recent decrease in pacing impedance after LVAD implant with substantial decrease in sensing and gradual increase in pacing threshold.    EP visit 10/27/23: He reports feeling well. He denies chest discomfort, palpitations, abdominal fullness/bloating or peripheral edema, shortness of breath, paroxysmal nocturnal dyspnea, orthopnea, lightheadedness, dizziness, pre-syncope, or syncope. Device interrogation shows normal device function, RV lead impedence in clinic today is 203 ohms., no ventricular arrhythmias, and  <1%. Current cardiac medications include: Bumex, Eplerenone, Digoxin, Lisinopril, Jardiance, ASA, Amiodarone, and Warfarin.     He presents 4/10/2024 for follow up after lead extraction and re-implant of new RV lead. He reports feeling well. He denies chest discomfort, palpitations, abdominal fullness/bloating or peripheral edema, shortness of breath, paroxysmal nocturnal dyspnea, orthopnea, lightheadedness, dizziness, pre-syncope, or syncope. Device interrogation shows normal device  function, no ventricular arrhythmias, and  <1%. Current cardiac medications include: Bumex, Eplerenone, Digoxin, Lisinopril, Jardiance, ASA, Amiodarone, and Warfarin.     I have reviewed and updated the patient's Past Medical History, Social History, Family History and Medication List.     Cardiographics (Personally Reviewed) :   5/3/23 Echo:   Interpretation Summary  Please refer to the EPIC report for measurements performed at different LVAD  speed settings.  HM3 at 5200RPM at baseline.  LVIDd 43mm.  Septum normal.  Aortic valve remain closed at baseline.    4/2017 CMR:  IMPRESSION:  1.  Severely dilated left ventricle with severely reduced global  function with a calculated ejection fraction of  12 %.  2.  Moderately dilated right ventricle with moderately reduced global  function with a calculated ejection fraction of 30%.   3.  There is mild mitral and tricuspid regurgitation.  4.  The left atrium is severely dilated. The right atrium is mildly  dilated.  5.  On delayed enhancement imaging, there is extensive  hyperenhancement in multiple non-ischemic patterns: transmural,  mid-myocardial, subendocardial, and epicardial. 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 The extent and  distribution of hyperenhancement are unchanged from the prior study.  6.  Compared with the prior study dated 1/11/2017, right ventricular  function has improved.          Physical Examination   There were no vitals taken for this visit.  Wt Readings from Last 3 Encounters:   04/02/24 88.4 kg (194 lb 14.4 oz)   04/01/24 88.7 kg (195 lb 8.8 oz)   01/04/24 86.1 kg (189 lb 13.1 oz)     General Appearance:   Alert, well-appearing and in no acute distress.   HEENT: Atraumatic, normocephalic. PERRL.  MMM.   Chest/Lungs:   Respirations unlabored.  Lungs are clear to auscultation.   Cardiovascular:   VAD hum. Regular.     Abdomen:  Soft, nontender, nondistended.   Extremities: No cyanosis or clubbing. No edema.     Musculoskeletal: Moves all extremities.  Gait normal.   Skin: Warm, dry, intact.    Neurologic: Mood and affect are appropriate.  Alert and oriented to person, place, time, and situation.          Medications  Allergies   Current Outpatient Medications   Medication Sig Dispense Refill    acetaminophen (TYLENOL) 325 MG tablet Take 2 tablets (650 mg) by mouth every 4 hours as needed for other (For optimal non-opioid multimodal pain management to improve pain control.) 100 tablet 0    amiodarone (PACERONE) 200 MG tablet Take 1 tablet (200 mg) by mouth daily 30 tablet 11    amoxicillin (AMOXIL) 500 MG tablet Take 4 tablets (2,000 mg) by mouth as needed (Take one hour before dental procedure.) 4 tablet 0    bumetanide (BUMEX) 1 MG tablet Take 1 tablet (1 mg) by mouth every other day 45 tablet 3    digoxin (LANOXIN) 250 MCG tablet Take 1 tablet (250 mcg) by mouth daily 30 tablet 11    empagliflozin (JARDIANCE) 10 MG TABS tablet Take 1 tablet (10 mg) by mouth daily 90 tablet 3    eplerenone (INSPRA) 25 MG tablet Take 12.5mg (1/2 tab) daily 45 tablet 3    lisinopril (ZESTRIL) 5 MG tablet Take 1 tablet (5 mg) by mouth daily 90 tablet 3    pantoprazole (PROTONIX) 20 MG EC tablet Take 1 tablet (20 mg) by mouth every morning (before breakfast) 90 tablet 3    sildenafil (VIAGRA) 50 MG tablet Take 1 tablet (50 mg) by mouth daily as needed (PRN for sexual activity) 30 tablet 0    warfarin ANTICOAGULANT (COUMADIN) 5 MG tablet Take 1 tablet (5 mg) by mouth daily for 120 days Take 5 mg MWF and 2.5 rest of the week 30 tablet 3    Allergies   Allergen Reactions    Codeine      Other reaction(s): GI intolerance, Intolerance-Can't Take         Lab Results (Personally Reviewed)    Chemistry/lipid CBC Cardiac Enzymes/BNP/TSH/INR   Lab Results   Component Value Date    BUN 22.5 (H) 04/01/2024     04/01/2024    CO2 27 04/01/2024     Creatinine   Date Value Ref Range Status   04/01/2024 1.37 (H) 0.67 - 1.17 mg/dL Final   11/20/2019  0.90 0.66 - 1.25 mg/dL Final       Lab Results   Component Value Date    CHOL 111 03/20/2023    HDL 39 (L) 03/20/2023    LDL 55 03/20/2023      Lab Results   Component Value Date    WBC 10.6 04/01/2024    HGB 14.2 04/01/2024    HCT 46.0 04/01/2024    MCV 87 04/01/2024     04/01/2024    Lab Results   Component Value Date    TSH 4.04 06/07/2023    INR 2.3 04/05/2024        The patient states understanding and is agreeable with the plan.   Rob Campbell MD   EP Fellow, PGY-8  [phone]     EP STAFF NOTE  Patient seen and examined and management plan personally reviewed with the patient. I agree with the note above by the CV/EP fellow.  Charles Montgomery MD [facility]  Cardiology - Electrophysiology  Total time spent on patient visit, reviewing notes, imaging, labs, orders, and completing necessary documentation: 45 minutes.  >50% of visit spent on counseling patient and/or coordination of care.      Please do not hesitate to contact me if you have any questions/concerns.     Sincerely,     Charles Montgomery MD

## 2024-04-10 NOTE — NURSING NOTE
Chief Complaint   Patient presents with    Follow Up     3 mo follow-up RV lead extraction and reimplant. Hx NICM, VT/VF arrest, LVAD.       Vitals were taken and medications reconciled.    Jose L Cruz, ELYSSA  10:42 AM

## 2024-04-10 NOTE — PROGRESS NOTES
04/10/24 1130 04/10/24 1134   Heartmate 3 LEFT VS   Flow (Lpm) 4.7 Lpm 4.3 Lpm   Pulse Index (PI) 2.6 PI 4 PI   Speed (rpm) 5200 rpm (S)  5100 rpm  (decreased per dr. miller)   Power (mari) 3.7 mari 4 mari

## 2024-04-10 NOTE — PATIENT INSTRUCTIONS
It was a pleasure to see you in clinic today.  Please do not hesitate to call with any questions or concerns.  You are scheduled for a remote transmission from home on 7/10/24.      Mirella Gao, RN, MS, CCRN  Electrophysiology Nurse Clinician  [facility]    During Business Hours Please Call:  [phone]  After Hours Please Call:  [phone] - select option #4 and ask for job code [id number]

## 2024-04-10 NOTE — PATIENT INSTRUCTIONS
Plan:    Decrease amiodarone to 100mg daily  Follow-up in 6 months with EP RABIA with device and labs prior      Your Care Team:  EP Cardiology   Telephone Number     Saloni Martin RN [phone]    After business hours: [phone], ask for cardiologist on-call   Non-procedure scheduling:    Lata SILVER   [phone]   Procedure scheduling:    Emma Waggoner   [phone]   Device Clinic (Pacemakers, ICDs, Loop Recorders)    During business hours: [phone]  After business hours:   [phone]- select option 4 and ask for job code [phone].       Cardiovascular Clinic:   [address] [place] [address]. [place] [address]      As always, thank you for trusting us with your health care needs!

## 2024-04-11 LAB
MDC_IDC_LEAD_CONNECTION_STATUS: NORMAL
MDC_IDC_LEAD_IMPLANT_DT: NORMAL
MDC_IDC_LEAD_LOCATION: NORMAL
MDC_IDC_LEAD_LOCATION_DETAIL_1: NORMAL
MDC_IDC_LEAD_MFG: NORMAL
MDC_IDC_LEAD_MODEL: NORMAL
MDC_IDC_LEAD_POLARITY_TYPE: NORMAL
MDC_IDC_LEAD_SERIAL: NORMAL
MDC_IDC_MSMT_BATTERY_DTM: NORMAL
MDC_IDC_MSMT_BATTERY_REMAINING_LONGEVITY: 126 MO
MDC_IDC_MSMT_BATTERY_REMAINING_PERCENTAGE: 100 %
MDC_IDC_MSMT_BATTERY_STATUS: NORMAL
MDC_IDC_MSMT_CAP_CHARGE_DTM: NORMAL
MDC_IDC_MSMT_CAP_CHARGE_TIME: 10.5 S
MDC_IDC_MSMT_CAP_CHARGE_TYPE: NORMAL
MDC_IDC_MSMT_LEADCHNL_RV_IMPEDANCE_VALUE: 468 OHM
MDC_IDC_MSMT_LEADCHNL_RV_PACING_THRESHOLD_AMPLITUDE: 1 V
MDC_IDC_MSMT_LEADCHNL_RV_PACING_THRESHOLD_PULSEWIDTH: 0.4 MS
MDC_IDC_PG_IMPLANT_DTM: NORMAL
MDC_IDC_PG_MFG: NORMAL
MDC_IDC_PG_MODEL: NORMAL
MDC_IDC_PG_SERIAL: NORMAL
MDC_IDC_PG_TYPE: NORMAL
MDC_IDC_SESS_CLINIC_NAME: NORMAL
MDC_IDC_SESS_DTM: NORMAL
MDC_IDC_SESS_TYPE: NORMAL
MDC_IDC_SET_BRADY_LOWRATE: 40 {BEATS}/MIN
MDC_IDC_SET_BRADY_MODE: NORMAL
MDC_IDC_SET_LEADCHNL_RV_PACING_AMPLITUDE: 2.4 V
MDC_IDC_SET_LEADCHNL_RV_PACING_POLARITY: NORMAL
MDC_IDC_SET_LEADCHNL_RV_PACING_PULSEWIDTH: 0.4 MS
MDC_IDC_SET_LEADCHNL_RV_SENSING_ADAPTATION_MODE: NORMAL
MDC_IDC_SET_LEADCHNL_RV_SENSING_POLARITY: NORMAL
MDC_IDC_SET_LEADCHNL_RV_SENSING_SENSITIVITY: 0.6 MV
MDC_IDC_SET_ZONE_DETECTION_INTERVAL: 300 MS
MDC_IDC_SET_ZONE_DETECTION_INTERVAL: 353 MS
MDC_IDC_SET_ZONE_STATUS: NORMAL
MDC_IDC_SET_ZONE_STATUS: NORMAL
MDC_IDC_SET_ZONE_TYPE: NORMAL
MDC_IDC_SET_ZONE_TYPE: NORMAL
MDC_IDC_SET_ZONE_VENDOR_TYPE: NORMAL
MDC_IDC_SET_ZONE_VENDOR_TYPE: NORMAL
MDC_IDC_STAT_BRADY_DTM_END: NORMAL
MDC_IDC_STAT_BRADY_DTM_START: NORMAL
MDC_IDC_STAT_BRADY_RV_PERCENT_PACED: 1 %
MDC_IDC_STAT_EPISODE_RECENT_COUNT: 0
MDC_IDC_STAT_EPISODE_RECENT_COUNT_DTM_END: NORMAL
MDC_IDC_STAT_EPISODE_RECENT_COUNT_DTM_START: NORMAL
MDC_IDC_STAT_EPISODE_TYPE: NORMAL
MDC_IDC_STAT_EPISODE_VENDOR_TYPE: NORMAL
MDC_IDC_STAT_TACHYTHERAPY_ATP_DELIVERED_RECENT: 0
MDC_IDC_STAT_TACHYTHERAPY_ATP_DELIVERED_TOTAL: 1
MDC_IDC_STAT_TACHYTHERAPY_RECENT_DTM_END: NORMAL
MDC_IDC_STAT_TACHYTHERAPY_RECENT_DTM_START: NORMAL
MDC_IDC_STAT_TACHYTHERAPY_SHOCKS_ABORTED_RECENT: 0
MDC_IDC_STAT_TACHYTHERAPY_SHOCKS_ABORTED_TOTAL: 0
MDC_IDC_STAT_TACHYTHERAPY_SHOCKS_DELIVERED_RECENT: 0
MDC_IDC_STAT_TACHYTHERAPY_SHOCKS_DELIVERED_TOTAL: 0
MDC_IDC_STAT_TACHYTHERAPY_TOTAL_DTM_END: NORMAL
MDC_IDC_STAT_TACHYTHERAPY_TOTAL_DTM_START: NORMAL

## 2024-04-17 ENCOUNTER — CARE COORDINATION (OUTPATIENT)
Dept: CARDIOLOGY | Facility: CLINIC | Age: 59
End: 2024-04-17

## 2024-04-17 ENCOUNTER — LAB (OUTPATIENT)
Dept: LAB | Facility: CLINIC | Age: 59
End: 2024-04-17
Payer: COMMERCIAL

## 2024-04-17 DIAGNOSIS — Z79.899 ON AMIODARONE THERAPY: ICD-10-CM

## 2024-04-17 DIAGNOSIS — I50.22 CHRONIC SYSTOLIC CONGESTIVE HEART FAILURE (H): ICD-10-CM

## 2024-04-17 LAB
ANION GAP SERPL CALCULATED.3IONS-SCNC: 10 MMOL/L (ref 7–15)
BUN SERPL-MCNC: 17 MG/DL (ref 6–20)
CALCIUM SERPL-MCNC: 8.9 MG/DL (ref 8.6–10)
CHLORIDE SERPL-SCNC: 97 MMOL/L (ref 98–107)
CREAT SERPL-MCNC: 1.28 MG/DL (ref 0.67–1.17)
DEPRECATED HCO3 PLAS-SCNC: 28 MMOL/L (ref 22–29)
EGFRCR SERPLBLD CKD-EPI 2021: 65 ML/MIN/1.73M2
GLUCOSE SERPL-MCNC: 104 MG/DL (ref 70–99)
POTASSIUM SERPL-SCNC: 4.5 MMOL/L (ref 3.4–5.3)
SODIUM SERPL-SCNC: 135 MMOL/L (ref 135–145)
TSH SERPL DL<=0.005 MIU/L-ACNC: 3.18 UIU/ML (ref 0.3–4.2)

## 2024-04-17 PROCEDURE — 36415 COLL VENOUS BLD VENIPUNCTURE: CPT | Performed by: PATHOLOGY

## 2024-04-17 PROCEDURE — 84443 ASSAY THYROID STIM HORMONE: CPT | Performed by: PATHOLOGY

## 2024-04-17 PROCEDURE — 80048 BASIC METABOLIC PNL TOTAL CA: CPT | Performed by: PATHOLOGY

## 2024-04-17 NOTE — PROGRESS NOTES
D:  Follow up labs rcvd.  Labs all as expected or slightly improved.  Called pt to discuss.  Current LVAD numbers, Speed: 5100, Flow: 4.6, PI: 2.3, Power: 3.6, & weight 188 lbs.  I:  Reviewed labs and status improvements with Dr. Aguiar.  Plan: no further follow up required.  Pt to notify us if things worsen or change.    A:  Follow up  P:  Pt verbalized understanding of the instructions given.  Will call VAD coordinator with further needs and questions.

## 2024-05-03 ENCOUNTER — MYC MEDICAL ADVICE (OUTPATIENT)
Dept: ANTICOAGULATION | Facility: CLINIC | Age: 59
End: 2024-05-03
Payer: COMMERCIAL

## 2024-05-06 ENCOUNTER — ANTICOAGULATION THERAPY VISIT (OUTPATIENT)
Dept: ANTICOAGULATION | Facility: CLINIC | Age: 59
End: 2024-05-06
Payer: COMMERCIAL

## 2024-05-06 DIAGNOSIS — I50.22 CHRONIC SYSTOLIC CONGESTIVE HEART FAILURE (H): ICD-10-CM

## 2024-05-06 DIAGNOSIS — Z79.899 LONG TERM USE OF DRUG: ICD-10-CM

## 2024-05-06 DIAGNOSIS — Z95.811 LVAD (LEFT VENTRICULAR ASSIST DEVICE) PRESENT (H): ICD-10-CM

## 2024-05-06 DIAGNOSIS — Z79.01 ANTICOAGULATED ON COUMADIN: ICD-10-CM

## 2024-05-06 DIAGNOSIS — Z95.811 LEFT VENTRICULAR ASSIST DEVICE PRESENT (H): ICD-10-CM

## 2024-05-06 DIAGNOSIS — I50.23 ACUTE ON CHRONIC SYSTOLIC CONGESTIVE HEART FAILURE (H): Primary | ICD-10-CM

## 2024-05-06 LAB — INR HOME MONITORING: 2.4 (ref 2–3)

## 2024-05-06 NOTE — PROGRESS NOTES
ANTICOAGULATION MANAGEMENT     Akshat Fragoso 59 year old male is on warfarin with therapeutic INR result. (Goal INR 2.0-3.0)    Recent labs: (last 7 days)     05/03/24  0000   INR 2.4       ASSESSMENT     Source(s): Chart Review     Warfarin doses taken: Reviewed in chart  Diet: No new diet changes identified  Medication/supplement changes:   4/10/24  amiodarone (PACERONE) 200 MG tablet-Take 1 tablet (200 mg) by mouth daily   Changed to amiodarone (PACERONE) 200 MG tablet-Take 0.5 tablets (100 mg) by mouth daily   New illness, injury, or hospitalization: No  Signs or symptoms of bleeding or clotting: No  Previous result: Therapeutic last 2(+) visits  Additional findings: None       PLAN     Recommended plan for ongoing change(s) affecting INR     Dosing Instructions: Continue your current warfarin dose with next INR in 1-2 weeks       Summary  As of 5/6/2024      Full warfarin instructions:  5 mg every Mon, Wed, Fri; 2.5 mg all other days   Next INR check:  5/17/2024               Detailed voice message left for Akshat with dosing instructions and follow up date.     Patient to recheck with home meter    Education provided:   Please call back if any changes to your diet, medications or how you've been taking warfarin  Goal range and lab monitoring: goal range and significance of current result and Importance of following up at instructed interval  Contact [phone] with any changes, questions or concerns.     Plan made per [facility] anticoagulation protocol and per LVAD protocol    TREV LYN RN  Anticoagulation Clinic  5/6/2024    _______________________________________________________________________     Anticoagulation Episode Summary       Current INR goal:  2.0-3.0   TTR:  84.3% (1 y)   Target end date:  Indefinite   Send INR reminders to:  ANTICOAG LVAD    Indications    Acute on chronic systolic congestive heart failure (H) [I50.23]  LVAD (left ventricular assist device) present (H) [Z95.811]  Chronic systolic  congestive heart failure (H) [I50.22]  Long term use of drug [Z79.899]  Left ventricular assist device present (H) [Z95.811]  Anticoagulated on Coumadin [Z79.01]             Comments:  Follow VAD Anticoag protocol:Yes: HeartMate 3   Bridging: Enoxaparin   Date VAD placed: 3/23/23             Anticoagulation Care Providers       Provider Role Specialty Phone number    Kenzie Moreau MD Referring Advanced Heart Failure and Transplant Cardiology [phone]    Mile Bolivar, APRN CNP Referring Nurse Practitioner [phone]    Shaun Aguiar MD Referring Cardiovascular Disease [phone]

## 2024-05-07 ENCOUNTER — CARE COORDINATION (OUTPATIENT)
Dept: CARDIOLOGY | Facility: CLINIC | Age: 59
End: 2024-05-07
Payer: COMMERCIAL

## 2024-05-07 NOTE — PROGRESS NOTES
Writer called patient to inform them of recent Heartmate FDA recall regarding extrinsic outflow graft obstruction (EOGO).     Writer reviewed:  Brief background of what EOGO is.  The rate of reported cases and deaths associated with EOGO (0.24% @ 2 years, 2.06% @ 5 years, 273 injuries, and 14 deaths).  Their pump does not need to be replaced, per VAD team, FDA, and industry. There is no indication to stop implanting Heartmate 3 pumps.  The VAD team monitors for EOGO in clinic visits, assessing VAD parameters and with routine testing. It is a treatable condition and we have successfully managed it.    Low flows are the most common sign of EOGO. Patient does not report any persistent changes in flow. Reviewed standard instructions that patient should assess and document VAD parameters once daily. They should notify VAD team for persistent flow changes of 2 or more and/or new/unexpected low flow alarms, or if they generally feel unwell.   The [facility] is also sending a letter to review this information. We will keep them informed of any new information or changes.   They should page the on-call VAD Coordinator with future questions or concerns.     Patient verbalized understanding of the above.

## 2024-05-20 ENCOUNTER — ANTICOAGULATION THERAPY VISIT (OUTPATIENT)
Dept: ANTICOAGULATION | Facility: CLINIC | Age: 59
End: 2024-05-20
Payer: COMMERCIAL

## 2024-05-20 DIAGNOSIS — Z79.899 LONG TERM USE OF DRUG: ICD-10-CM

## 2024-05-20 DIAGNOSIS — I50.23 ACUTE ON CHRONIC SYSTOLIC CONGESTIVE HEART FAILURE (H): Primary | ICD-10-CM

## 2024-05-20 DIAGNOSIS — I50.22 CHRONIC SYSTOLIC CONGESTIVE HEART FAILURE (H): ICD-10-CM

## 2024-05-20 DIAGNOSIS — Z95.811 LEFT VENTRICULAR ASSIST DEVICE PRESENT (H): ICD-10-CM

## 2024-05-20 DIAGNOSIS — Z95.811 LVAD (LEFT VENTRICULAR ASSIST DEVICE) PRESENT (H): ICD-10-CM

## 2024-05-20 DIAGNOSIS — Z79.01 ANTICOAGULATED ON COUMADIN: ICD-10-CM

## 2024-05-20 LAB — INR HOME MONITORING: 2.3 (ref 2–3)

## 2024-05-20 NOTE — PROGRESS NOTES
ANTICOAGULATION MANAGEMENT     Akshat Fragoso 59 year old male is on warfarin with therapeutic INR result. (Goal INR 2.0-3.0)    Recent labs: (last 7 days)     05/19/24  0000   INR 2.3       ASSESSMENT     Source(s): Chart Review  Previous INR was Therapeutic last 2(+) visits  Medication, diet, health changes since last INR chart reviewed; none identified         PLAN     Recommended plan for no diet, medication or health factor changes affecting INR     Dosing Instructions: Continue your current warfarin dose with next INR in 2 weeks       Summary  As of 5/20/2024      Full warfarin instructions:  5 mg every Mon, Wed, Fri; 2.5 mg all other days   Next INR check:  6/3/2024               Detailed voice message left for Akshat with dosing instructions and follow up date.     Patient to recheck with home meter    Education provided:   Please call back if any changes to your diet, medications or how you've been taking warfarin  Goal range and lab monitoring: goal range and significance of current result and Importance of following up at instructed interval  Contact [phone] with any changes, questions or concerns.     Plan made per [facility] anticoagulation protocol and per LVAD protocol    TREV LYN RN  Anticoagulation Clinic  5/20/2024    _______________________________________________________________________     Anticoagulation Episode Summary       Current INR goal:  2.0-3.0   TTR:  85.6% (1 y)   Target end date:  Indefinite   Send INR reminders to:  ANTICOAG LV[facility]    Indications    Acute on chronic systolic congestive heart failure (H) [I50.23]  LVAD (left ventricular assist device) present (H) [Z95.811]  Chronic systolic congestive heart failure (H) [I50.22]  Long term use of drug [Z79.899]  Left ventricular assist device present (H) [Z95.811]  Anticoagulated on Coumadin [Z79.01]             Comments:  Follow VAD Anticoag protocol:Yes: HeartMate 3   Bridging: Enoxaparin   Date VAD placed: 3/23/23              Anticoagulation Care Providers       Provider Role Specialty Phone number    Kenzie Moreau MD Referring Advanced Heart Failure and Transplant Cardiology [phone]    Mile Bolivar, APRN CNP Referring Nurse Practitioner [phone]    Shaun Aguiar MD Referring Cardiovascular Disease [phone]

## 2024-05-26 ENCOUNTER — HEALTH MAINTENANCE LETTER (OUTPATIENT)
Age: 59
End: 2024-05-26

## 2024-06-04 ENCOUNTER — DOCUMENTATION ONLY (OUTPATIENT)
Dept: ANTICOAGULATION | Facility: CLINIC | Age: 59
End: 2024-06-04
Payer: COMMERCIAL

## 2024-06-04 DIAGNOSIS — Z95.811 LVAD (LEFT VENTRICULAR ASSIST DEVICE) PRESENT (H): Primary | ICD-10-CM

## 2024-06-04 NOTE — PROGRESS NOTES
ANTICOAGULATION CLINIC REFERRAL RENEWAL REQUEST       An annual renewal order is required for all patients referred to [facility].?  Please review and sign the pended referral order for Akshat Fragoso.       ANTICOAGULATION SUMMARY      Warfarin indication(s)   LVAD    Mechanical heart valve present?  NO       Current goal range   INR: 2.0-3.0     Goal appropriate for indication? Goal INR 2-3, standard for indication(s) above     Time in Therapeutic Range (TTR)  (Goal > 60%) 85.6%       Office visit with referring provider's group within last year yes on 4/10/24       TREV LYN RN  [facility]

## 2024-06-08 LAB — INR HOME MONITORING: 2.4 (ref 2–3)

## 2024-06-10 ENCOUNTER — ANTICOAGULATION THERAPY VISIT (OUTPATIENT)
Dept: ANTICOAGULATION | Facility: CLINIC | Age: 59
End: 2024-06-10
Payer: COMMERCIAL

## 2024-06-10 DIAGNOSIS — Z95.811 LEFT VENTRICULAR ASSIST DEVICE PRESENT (H): ICD-10-CM

## 2024-06-10 DIAGNOSIS — Z79.899 LONG TERM USE OF DRUG: ICD-10-CM

## 2024-06-10 DIAGNOSIS — I50.22 CHRONIC SYSTOLIC CONGESTIVE HEART FAILURE (H): ICD-10-CM

## 2024-06-10 DIAGNOSIS — I50.23 ACUTE ON CHRONIC SYSTOLIC CONGESTIVE HEART FAILURE (H): Primary | ICD-10-CM

## 2024-06-10 DIAGNOSIS — Z79.01 ANTICOAGULATED ON COUMADIN: ICD-10-CM

## 2024-06-10 DIAGNOSIS — Z95.811 LVAD (LEFT VENTRICULAR ASSIST DEVICE) PRESENT (H): ICD-10-CM

## 2024-06-10 NOTE — PROGRESS NOTES
ANTICOAGULATION MANAGEMENT     Akshat Fragoso 59 year old male is on warfarin with therapeutic INR result. (Goal INR 2.0-3.0)    Recent labs: (last 7 days)     06/08/24  0000   INR 2.4       ASSESSMENT     Source(s): Chart Review     Warfarin doses taken: Reviewed in chart  Diet: No new diet changes identified  Medication/supplement changes: None noted  New illness, injury, or hospitalization: No  Signs or symptoms of bleeding or clotting: No  Previous result: Therapeutic last 2(+) visits  Additional findings: None       PLAN     Recommended plan for no diet, medication or health factor changes affecting INR     Dosing Instructions: Continue your current warfarin dose with next INR in 2 weeks       Summary  As of 6/10/2024      Full warfarin instructions:  5 mg every Mon, Wed, Fri; 2.5 mg all other days   Next INR check:  6/24/2024               Detailed voice message left for Akshat with dosing instructions and follow up date.     Patient to recheck with home meter    Education provided:   Please call back if any changes to your diet, medications or how you've been taking warfarin  Contact [phone]  with any changes, questions or concerns.     Plan made per [facility] anticoagulation protocol and per [facility]AD protocol    Merry Reyes RN  Anticoagulation Clinic  6/10/2024    _______________________________________________________________________     Anticoagulation Episode Summary       Current INR goal:  2.0-3.0   TTR:  85.5% (1 y)   Target end date:  Indefinite   Send INR reminders to:  ANTICOAG [facility]    Indications    Acute on chronic systolic congestive heart failure (H) [I50.23]  LVAD (left ventricular assist device) present (H) [Z95.811]  Chronic systolic congestive heart failure (H) [I50.22]  Long term use of drug [Z79.899]  Left ventricular assist device present (H) [Z95.811]  Anticoagulated on Coumadin [Z79.01]             Comments:  Follow VAD Anticoag protocol:Yes: HeartMate 3   Bridging: Enoxaparin   Date VAD  placed: 3/23/23             Anticoagulation Care Providers       Provider Role Specialty Phone number    Kenzie Moreau MD Referring Advanced Heart Failure and Transplant Cardiology [phone]    Mile Bolivar, APRN CNP Referring Nurse Practitioner [phone]    Shaun Aguiar MD Referring Cardiovascular Disease [phone]

## 2024-06-20 ENCOUNTER — ANTICOAGULATION THERAPY VISIT (OUTPATIENT)
Dept: ANTICOAGULATION | Facility: CLINIC | Age: 59
End: 2024-06-20
Payer: COMMERCIAL

## 2024-06-20 DIAGNOSIS — Z95.811 LVAD (LEFT VENTRICULAR ASSIST DEVICE) PRESENT (H): ICD-10-CM

## 2024-06-20 DIAGNOSIS — I50.22 CHRONIC SYSTOLIC CONGESTIVE HEART FAILURE (H): ICD-10-CM

## 2024-06-20 DIAGNOSIS — Z95.811 LEFT VENTRICULAR ASSIST DEVICE PRESENT (H): ICD-10-CM

## 2024-06-20 DIAGNOSIS — I50.23 ACUTE ON CHRONIC SYSTOLIC CONGESTIVE HEART FAILURE (H): Primary | ICD-10-CM

## 2024-06-20 DIAGNOSIS — Z79.01 ANTICOAGULATED ON COUMADIN: ICD-10-CM

## 2024-06-20 DIAGNOSIS — Z79.899 LONG TERM USE OF DRUG: ICD-10-CM

## 2024-06-20 LAB — INR HOME MONITORING: 2.6 (ref 2–3)

## 2024-06-20 RX ORDER — AMIODARONE HYDROCHLORIDE 200 MG/1
100 TABLET ORAL DAILY
Qty: 45 TABLET | Refills: 1 | Status: SHIPPED | OUTPATIENT
Start: 2024-06-20 | End: 2024-08-30

## 2024-06-20 NOTE — PROGRESS NOTES
ANTICOAGULATION MANAGEMENT     Akshat Fragoso 59 year old male is on warfarin with therapeutic INR result. (Goal INR 2.0-3.0)    Recent labs: (last 7 days)     06/20/24  0000   INR 2.6       ASSESSMENT     Source(s): Chart Review     Warfarin doses taken: Reviewed in chart  Diet: No new diet changes identified  Medication/supplement changes: None noted  New illness, injury, or hospitalization: No  Signs or symptoms of bleeding or clotting: No  Previous result: Therapeutic last 2(+) visits  Additional findings: None       PLAN     Recommended plan for no diet, medication or health factor changes affecting INR     Dosing Instructions: Continue your current warfarin dose with next INR in 1 week.  Akshat has appointments with cardiology 6/27/24, so will check INR then.       Summary  As of 6/20/2024      Full warfarin instructions:  5 mg every Mon, Wed, Fri; 2.5 mg all other days   Next INR check:  6/27/2024               Detailed voice message left for Akshat with dosing instructions and follow up date.     Check at provider office visit    Education provided:   Please call back if any changes to your diet, medications or how you've been taking warfarin  Contact [phone] with any changes, questions or concerns.     Plan made per [facility] anticoagulation protocol and per [facility]AD protocol    Yessenia Mcfarland RN  Anticoagulation Clinic  6/20/2024    _______________________________________________________________________     Anticoagulation Episode Summary       Current INR goal:  2.0-3.0   TTR:  85.6% (1 y)   Target end date:  Indefinite   Send INR reminders to:  ANTICOAG [facility]AD    Indications    Acute on chronic systolic congestive heart failure (H) [I50.23]  LVAD (left ventricular assist device) present (H) [Z95.811]  Chronic systolic congestive heart failure (H) [I50.22]  Long term use of drug [Z79.899]  Left ventricular assist device present (H) [Z95.811]  Anticoagulated on Coumadin [Z79.01]             Comments:  Follow VAD  Anticoag protocol:Yes: HeartMate 3   Bridging: Enoxaparin   Date VAD placed: 3/23/23             Anticoagulation Care Providers       Provider Role Specialty Phone number    Kenzie Moreau MD Referring Advanced Heart Failure and Transplant Cardiology [phone]    Mile Bolivar, VERONICA CNP Referring Nurse Practitioner [phone]    Shaun Aguiar MD Referring Cardiovascular Disease [phone]

## 2024-06-28 ENCOUNTER — TELEPHONE (OUTPATIENT)
Dept: CARDIOLOGY | Facility: CLINIC | Age: 59
End: 2024-06-28
Payer: COMMERCIAL

## 2024-06-28 NOTE — TELEPHONE ENCOUNTER
Left Voicemail (1st Attempt) for the patient to call back and schedule the following:    Appointment type:  rt ep   Provider: [facility]  Return date: 10/10/24  Specialty phone number: [phone] opt 1   Additional appointment(s) needed: labs   Additonal Notes: n/a

## 2024-07-03 NOTE — TELEPHONE ENCOUNTER
Left Voicemail (2nd Attempt) for the patient to call back and schedule the following:    Appointment type:  rt ep   Provider: [facility]  Return date: 10/10/24  Specialty phone number: [phone] opt 1   Additional appointment(s) needed: labs   Additonal Notes: n/a

## 2024-07-10 ENCOUNTER — ANCILLARY PROCEDURE (OUTPATIENT)
Dept: CARDIOLOGY | Facility: CLINIC | Age: 59
End: 2024-07-10
Attending: INTERNAL MEDICINE
Payer: COMMERCIAL

## 2024-07-10 DIAGNOSIS — I50.22 CHRONIC SYSTOLIC HEART FAILURE (H): ICD-10-CM

## 2024-07-10 DIAGNOSIS — Z95.810 ICD (IMPLANTABLE CARDIOVERTER-DEFIBRILLATOR) IN PLACE: ICD-10-CM

## 2024-07-10 DIAGNOSIS — I42.9 CARDIOMYOPATHY (H): ICD-10-CM

## 2024-07-10 PROCEDURE — 93295 DEV INTERROG REMOTE 1/2/MLT: CPT | Performed by: INTERNAL MEDICINE

## 2024-07-10 PROCEDURE — 93296 REM INTERROG EVL PM/IDS: CPT

## 2024-07-11 ENCOUNTER — DOCUMENTATION ONLY (OUTPATIENT)
Dept: ANTICOAGULATION | Facility: CLINIC | Age: 59
End: 2024-07-11
Payer: COMMERCIAL

## 2024-07-15 ENCOUNTER — ANTICOAGULATION THERAPY VISIT (OUTPATIENT)
Dept: ANTICOAGULATION | Facility: CLINIC | Age: 59
End: 2024-07-15
Payer: COMMERCIAL

## 2024-07-15 DIAGNOSIS — Z95.811 LVAD (LEFT VENTRICULAR ASSIST DEVICE) PRESENT (H): ICD-10-CM

## 2024-07-15 DIAGNOSIS — Z79.01 ANTICOAGULATED ON COUMADIN: ICD-10-CM

## 2024-07-15 DIAGNOSIS — I50.23 ACUTE ON CHRONIC SYSTOLIC CONGESTIVE HEART FAILURE (H): Primary | ICD-10-CM

## 2024-07-15 DIAGNOSIS — Z79.899 LONG TERM USE OF DRUG: ICD-10-CM

## 2024-07-15 DIAGNOSIS — I50.22 CHRONIC SYSTOLIC CONGESTIVE HEART FAILURE (H): ICD-10-CM

## 2024-07-15 DIAGNOSIS — Z95.811 LEFT VENTRICULAR ASSIST DEVICE PRESENT (H): ICD-10-CM

## 2024-07-15 LAB — INR HOME MONITORING: 2.3 (ref 2–3)

## 2024-07-15 NOTE — PROGRESS NOTES
ANTICOAGULATION MANAGEMENT     Akshat Fragoso 59 year old male is on warfarin with therapeutic INR result. (Goal INR 2.0-3.0)    Recent labs: (last 7 days)     07/15/24  0000   INR 2.3       ASSESSMENT     Source(s): Chart Review  Previous INR was Therapeutic last 2(+) visits  Medication, diet, health changes since last INR chart reviewed; none identified         PLAN     Recommended plan for no diet, medication or health factor changes and previous 2 INR results within goal affecting INR     Dosing Instructions: Continue your current warfarin dose with next INR in 2 weeks       Summary  As of 7/15/2024      Full warfarin instructions:  5 mg every Mon, Wed, Fri; 2.5 mg all other days   Next INR check:  7/29/2024               Detailed voice message left for Akshat with dosing instructions and follow up date.     Patient to recheck with home meter    Education provided: Please call back if any changes to your diet, medications or how you've been taking warfarin  Goal range and lab monitoring: goal range and significance of current result  Importance of notifying anticoagulation clinic for: health changes  Contact [phone] with any changes, questions or concerns.     Plan made per [facility] anticoagulation protocol and per LVAD protocol    Justina Wilkins, RN  Anticoagulation Clinic  7/15/2024    _______________________________________________________________________     Anticoagulation Episode Summary       Current INR goal:  2.0-3.0   TTR:  85.6% (1 y)   Target end date:  Indefinite   Send INR reminders to:  ANTICOAG LVAD    Indications    Acute on chronic systolic congestive heart failure (H) [I50.23]  LVAD (left ventricular assist device) present (H) [Z95.811]  Chronic systolic congestive heart failure (H) [I50.22]  Long term use of drug [Z79.899]  Left ventricular assist device present (H) [Z95.811]  Anticoagulated on Coumadin [Z79.01]             Comments:  Follow VAD Anticoag protocol:Yes: HeartMate 3   Bridging:  Enoxaparin   Date VAD placed: 3/23/23             Anticoagulation Care Providers       Provider Role Specialty Phone number    Kenzie Moreau MD Referring Advanced Heart Failure and Transplant Cardiology [phone]    Mile Bolivar, VERONICA CNP Referring Nurse Practitioner [phone]    Shaun Aguiar MD Referring Cardiovascular Disease [phone]

## 2024-07-17 DIAGNOSIS — I50.22 CHRONIC SYSTOLIC HEART FAILURE (H): Primary | ICD-10-CM

## 2024-07-18 NOTE — PROGRESS NOTES
LVAD Clinic  RABIA follow-up     Akshat Fragoso is a 59 year old male with history of HFrEF 2/2 NICM (diagnosed in 1/2017) s/p ICD placement in 6/2017, VT/VF arrest on 3/15/2023 requiring CPR for 6 mins with cardiogenic shock s/p HM3 LVAD implantation as DT 3/23/2023 c/b postop AF (on amiodarone and digoxin) and HAP, moderate TR s/p TV annuloplasty with 32 mm MC3 partial ring 3/23/2023, PFO closure 3/23/2023, chronic tobacco/marijuana use, COPD, HTN, CKD stage III, who presents today for post LVAD implant follow up.     He was admitted on 3/15/2023 for cardiac arrest in the setting of VT that was slower than his programmed VT threshold for intervention. Patient had been undergoing workup at [facility] for LVAD as destination therapy (initially evaluated for transplant though patient having trouble quitting marijuana/tobacco use). He underwent HM3 LVAD implantation on 3/23/2023 with postop course c/b AF with RVR requiring amiodarone gtt, volume overload, and HAP treated with IV vancomycin and zosyn for 5 days. He was discharged to home on 4/9/2023.     He last saw Dr. Aguiar 4/2/24. He will let us know when he is ready for routine testing. Speed was increased to 5200. No other medication changes.    Today  Fatigue is much better. He does get some JOHNSON. Can walk about 1 block -that is improved.No LE edema. No abdominal edema. No orthopnea. No PND. No lightheadedness or dizziness unless he stands up too quick. This happens especially if he took his bumex that day. No pre-syncope or syncope. No palpitations. No chest pain. Appetite is good. No alarms on the lvad.     No blood in the urine or blood in the stool. No prolonged nosebleeds.      No driveline concerns. No fever or chills.     Headaches are much better. Takes some tylenol. No stroke symptoms.      Weight at home without the vad has been 183 lbs.     Current cardiac medications  - Lisinopril 5 mg daily  - Inspra 12.5 m gdaily  - Empagliflozin 10 mg daily  - Bumex 1 mg  qod  - Digoxin 250 mcg daily  - Amiodarone 100 mg daily  - Warfarin (INR goal 2-3)      PAST MEDICAL HISTORY:  Past Medical History:   Diagnosis Date    Acute right lumbar radiculopathy 11/02/2015    Acute systolic heart failure (H) 01/10/2017    Cardiomyopathy (H) 01/10/2017    CKD (chronic kidney disease) stage 3, GFR 30-59 ml/min (H) 01/30/2020    JOHNSON (dyspnea on exertion)     ED (erectile dysfunction) 10/02/2019    Erectile dysfunction     ICD (implantable cardioverter-defibrillator) in place- [employer], single chamber- NOT dependent 10/09/2017    Personal history of smoking 01/04/2017    Pulmonary nodules 01/17/2017    CT 11/2018:  Bilateral pulmonary nodules measuring up to 4 mm. No new or enlarging pulmonary nodules.     FAMILY HISTORY:  Family History   Problem Relation Age of Onset    Parkinsonism Mother     Atrial fibrillation Father     Heart Failure Father     Prostate Cancer Father     Skin Cancer Father     Anorexia nervosa Daughter     Other - See Comments Granddaughter         premature birth     SOCIAL HISTORY:  Social History     Socioeconomic History    Marital status:      Spouse name: Cheryl    Number of children: 2   Occupational History    Occupation: Construction      Employer: S[employer]   Tobacco Use    Smoking status: Former     Packs/day: 0.25     Years: 15.00     Pack years: 3.75     Types: Cigarettes    Smokeless tobacco: Former     Quit date: 3/15/2023   Vaping Use    Vaping status: Never Used   Substance and Sexual Activity    Alcohol use: No     Alcohol/week: 0.0 standard drinks of alcohol    Drug use: No    Sexual activity: Yes     Partners: Female     Birth control/protection: Condom   Other Topics Concern    Parent/sibling w/ CABG, MI or angioplasty before 65F 55M? Yes     Comment: both parents had stints placed      CURRENT MEDICATIONS:  Current Outpatient Medications   Medication Sig Dispense Refill    acetaminophen (TYLENOL) 325 MG tablet Take 2 tablets (650 mg) by  mouth every 4 hours as needed for other (For optimal non-opioid multimodal pain management to improve pain control.) 100 tablet 0    amiodarone (PACERONE) 200 MG tablet Take 0.5 tablets (100 mg) by mouth daily 45 tablet 1    amoxicillin (AMOXIL) 500 MG tablet Take 4 tablets (2,000 mg) by mouth as needed (Take one hour before dental procedure.) 4 tablet 2    bumetanide (BUMEX) 1 MG tablet Take 1 tablet (1 mg) by mouth every other day 45 tablet 3    digoxin (LANOXIN) 250 MCG tablet Take 1 tablet (250 mcg) by mouth daily 30 tablet 11    empagliflozin (JARDIANCE) 10 MG TABS tablet Take 1 tablet (10 mg) by mouth daily 90 tablet 3    eplerenone (INSPRA) 25 MG tablet Take 12.5mg (1/2 tab) daily 45 tablet 3    JANTOVEN ANTICOAGULANT 5 MG tablet       NARCAN 4 MG/0.1ML nasal spray Spray 4 mg into one nostril alternating nostrils once as needed      pantoprazole (PROTONIX) 20 MG EC tablet Take 1 tablet (20 mg) by mouth every morning (before breakfast) 90 tablet 3    sacubitril-valsartan (ENTRESTO) 24-26 MG per tablet Take 0.5 tablets by mouth 2 times daily Take 12-13mg (half tab) twice per day 45 tablet 0    sildenafil (VIAGRA) 50 MG tablet Take 1 tablet (50 mg) by mouth daily as needed (PRN for sexual activity) 30 tablet 0     No current facility-administered medications for this visit.     ROS:   See HPI     EXAM:  BP (!) 84/0 (BP Location: Right arm, Patient Position: Chair, Cuff Size: Adult Regular)   Wt 86.8 kg (191 lb 4.8 oz)   SpO2 97%   BMI 29.96 kg/m    GENERAL: Appears comfortable, in no acute distress.   HEENT: Eye symmetrical  CV: Hum of Hm3, no adventitious sounds. JVP <6.   RESPIRATORY: Respirations regular, even, and unlabored. Lungs CTA throughout.   GI: Soft  and non distended.   EXTREMITIES: Trace b/l peripheral edema. All extremities are warm and well perfused  NEUROLOGIC: Alert and interacting appropriately. No focal deficits.   SKIN: No jaundice.  Driveline site c/d/i.       Labs:  Lab Results    Component Value Date    WBC 14.8 (H) 07/24/2024    HGB 15.1 07/24/2024    HCT 49.4 07/24/2024     07/24/2024     07/24/2024    POTASSIUM 4.4 07/24/2024    CHLORIDE 96 (L) 07/24/2024    CO2 30 (H) 07/24/2024    BUN 18.6 07/24/2024    CR 1.36 (H) 07/24/2024     (H) 07/24/2024    DD 3.60 (H) 04/18/2023    NTBNPI 31,087 (H) 03/20/2023    NTBNP 4,114 (H) 07/24/2024    TROPI 0.033 01/09/2017    AST 35 07/24/2024    ALT 30 07/24/2024    ALKPHOS 90 07/24/2024    BILITOTAL 0.7 07/24/2024    INR 1.91 (H) 07/24/2024     Diagnostics:  7/22/24 ICD check  Device: [employer] D150 DYNAGEN  Normal Device Function  Mode: VVI 40 bpm  : 0%  Presenting EGM: VS 58 bpm  Lead Trends Appear Stable.  Estimated battery longevity to RRT = 10 years.  Atrial Arrhythmia: N/A  Anticoagulant: Warfarin  Ventricular Arrhythmia: None  Pt Notified of Transmission Results: [place]     4/10/24 ICD check  Device: [employer] D150 DYNAGEN  Normal device function  Mode: VVI 40 bpm  : <1%  Intrinsic rhythm: VS @ 83 bpm  Lead Trends Appear Stable: Yes  Estimated battery longevity to RRT = 10.5 years  Ventricular Arrhythmia: 0  Setting Changes: RV amplitude decreased to 2.4 V.  Patient has an appointment to see Dr. Montgomery today.   Plan: Device follow-up every 3 months.  DEL Gao, RN    4/1/24 [facility]  RA: --/--/15 mm Hg  RV: 60/15 mm Hg  PA: 60/30 (43) mm Hg  PCWP: --/--/24 mm Hg  CO/CI: 3.6/1.8 (TD); 3.3/1.7 (Carlos)   PVR: 5.2 (TD) LING   Right sided filling pressures are moderately elevated. Left sided filling pressures are moderately elevated. Moderately elevated pulmonary artery hypertension. Reduced cardiac output level. Hemodynamic data has been modified in Epic per physician review.        December 27, 2023 echo  Borderline-dilated LV with severely reduced global LV function, LVEF<20%.  LVIDd=5/8 cm.  RV function appears moderately reduced on limited views.  The aortic valve appears to remains closed on limited views.  There is no AI.  LVAD inflow cannula is visualized in the LV apex. LVAD outflow graft is  visualized in the aorta. Normal Doppler interrogation of the LVAD inflow  cannula and outflow graft.  This study was compared with the study from 5/3/23: No significant changes  noted.  ___________    8/7/23 [facility]  RA 8  RV 39/8  PA 39/15/24  [place]edge 7  Cardiac output 4.3, cardiac index 2.3 by thermo  Cardiac output 3,   cardiac index 1.59 by Carlos  PVR: 2 Right sided filling pressures are normal. Left sided filling pressures are normal. Mild elevated pulmonary hypertension. Normal cardiac output level.     Pressures Phase: Baseline     Time Systolic (mmHg) Diastolic (mmHg) Mean (mmHg) A Wave (mmHg) V Wave (mmHg) EDP (mmHg) Max dp/dt (mmHg/sec) HR (bpm) Content (mL/dL) SAT (%)   RA Pressures 12:09 PM   8    9    13      75        RV Pressures 12:15 PM 39        9     74        PA Pressures 12:16 PM 39    15    24        74        PCW Pressures 12:15 PM   7        75        Art Pressures 11:59 AM 99    75    83        77          Blood Flow Results Phase: Baseline     Time Results Indexed Values (L/min/m2)   QP 11:55 AM 3.07 L/min    1.59      QS 11:55 AM 3.07 L/min    1.59        Blood Oximetry Phase: Baseline       Time Hb SAT(%) PO2 Content (mL/dL) PA Sat (%)   PA 11:55 AM  59 %      59      Art 11:55 AM  100 %     19.18              TTE 3/28/2023  Technically difficult study.Extremely poor acoustic windows.  Limited information was obtained during study.  LVAD cannula was seen in the expected anatomic position in the LV apex.  HM3 at 5200RPM.  Septum normal.  LVIDd 56mm.  Aortic valve not well visualized. No AI.  Normal outflow velocity.  Global right ventricular function is moderately to severely reduced.  Small pericardial effusion with organizing material in pericardial cavity.     TTE 5/3/23:  Please refer to the EPIC report for measurements performed at different LVAD speed settings.  HM3 at 5200RPM at baseline.  LVIDd  43mm.  Septum normal.  Aortic valve remain closed at baseline.     Assessment and Plan:   Akshat Fragoso is a 59 year old male with history of HFrEF 2/2 NICM (diagnosed in 1/2017) s/p ICD placement in 6/2017, VT/VF arrest on 3/15/2023 requiring CPR for 6 mins with cardiogenic shock s/p HM3 LVAD implantation as DT 3/23/2023 c/b postop AF (on amiodarone and digoxin) and HAP, moderate TR s/p TV annuloplasty with 32 mm MC3 partial ring 3/23/2023, PFO closure 3/23/2023, chronic tobacco/marijuana use, COPD, HTN, CKD stage III, who presents today for post LVAD implant follow up.    He is doing well. He is more interested in transplant today (has previously not been sure if he would want to go through with evaluation and transplant.). He wanted to know next steps and we discussed that he would need to stop cannabis (per current program policy). Advised that he work with other providers to establish effective ancity management plan as he comes off that. He will let us know when he has stopped for 3-4 weeks and then we would begin testing.  Needs to be off tobacco as well.    He does have a leukocytosis today- no symptoms other than a known dental abscess for which he is on antibiotics. This is the most likely cause. We will check in 1-2 weeks and we also discussed to watch for new symptoms.    GDMT previously limited by bps, although now we have some room.     # Chronic systolic heart failure secondary to NICM with cardiogenic shock s/p HM III on 3/23/23 LVAD. ACC/AHA Stage D, NYHA Class III  Moderate TR s/p TV annuloplasty with 32 mm MC3 partial ring 3/23/2023.  Has a history of NICM diagnosed in 1/2017 and underwent ICD for primary SCD prophylaxis in 4/2017. Patient had been undergoing workup at [place] for LVAD as destination therapy (initially evaluated for transplant though patient having trouble quitting marijuana/tobacco use). He had cardiac arrest in the setting of VT that was slower than his programmed VT threshold for  intervention on 3/15/2023. Received CPR for 6 mins with ROSC and developed cardiogenic shock. During hospitalization, he underwent HM3 LVAD implantation on 3/23/2023 with postop course c/b AF with RVR requiring amiodarone gtt, volume overload, and HAP treated with IV vancomycin and zosyn for 5 days. He was discharged to home on 4/9/2023.    Fluid status euvolemic- continue bumex 1 mg every other day although it sounds like he gets a bit dizzy the days after he takes this, so he is going to try bumex 0.5 mg a few times and see if that does adequate diuresis without making him dizzy. He will let us know next week.  ACEi/ARB stop lisinopril. After 36 hours can start entresto 12-13 mg BID.   BB deferred given recent LVAD implant  Aldosterone antagonist Inspra 12.5 mg daily  SCD prophylaxis ICD  NSAID use: contraindicated  Sleep Apnea Evaluation: not discussed today  BP: MAP goal 65-85, within goal at 84 today  LDH trends: 278, stable, still establishing b/l  Anticoagulation: warfarin INR goal 2-3, today 1.91  Antiplatelet: Off ASA    VAD Interrogation on July 24, 2024 VAD interrogation reviewed with VAD coordinator. Agree with findings. Occasional PI events. No power spikes, speed drops, or other findings suspicious of pump malfunction noted.       # Persistent lukocytosis.   ID noted no indication for long term antibiotics inpatient given no acute findings on CT while inpatient (he had been treated for HAP with IV antibiotics while inpatient). No signs or symptoms of infection today.  He did have a pre-op luekocytosis longstanding around 11-12, if persistent leukocytosis after the acute post operative period could consider hematology. He did improve into the 11s, but now up to 14.9. no symptoms other than a known dental abscess for which he is on antibiotics. This is the most likely cause. We will check in 1-2 weeks and we also discussed to watch for new symptoms.  - Recheck in 1-2 weeks  - He knows to call for any new  symptoms    # Afib with RVR, resolved.   - Continue Amiodarone and Digoxin.  - Warfarin with INR goal 2-3, INR is 1.91    # Moderate TR s/p TV annuloplasty with 32 mm MC3 partial ring 3/23/2023  PFO closure 3/23/2023  - Trend on ECHOs    # Chronic tobacco/marijuana use  - Discussed today: He is more interested in transplant today (has previously not been sure if he would want to go through with evaluation and transplant.). He wanted to know next steps and we discussed that he would need to stop cannabis (per current program policy). Advised that he work with other providers to establish effective ancity management plan as he comes off that. He will let us know when he has stopped for 3-4 weeks and then we would begin testing.  Needs to be off tobacco as well.     # HTN.   - MAP within goal, continue the above regimen    Follow-up:  - BMP 1-2 weeks after changing from lisinopril to entresto  - Pt will report and improvements or worsening to his symptoms after trying slightly lower bumex dose  - CBC 1-2 weeks (ideally after the ental work)  - Dr. Aguiar in 3 months  - VAD RABIA in 6 months     Billing  - I managed 2 stable chronic conditions  - I reviewed 4 tests  - I changed a medication    The longitudinal plan of care for the diagnosis(es)/condition(s) as documented were addressed during this visit. Due to the added complexity in care, I will continue to support Akshat in the subsequent management and with ongoing continuity of care.    Elba Chen PA-C  [facility] Heart Failure  [facility]

## 2024-07-22 ENCOUNTER — MYC REFILL (OUTPATIENT)
Dept: CARDIOLOGY | Facility: CLINIC | Age: 59
End: 2024-07-22
Payer: COMMERCIAL

## 2024-07-22 DIAGNOSIS — Z95.811 LVAD (LEFT VENTRICULAR ASSIST DEVICE) PRESENT (H): ICD-10-CM

## 2024-07-22 DIAGNOSIS — I50.22 CHRONIC SYSTOLIC CONGESTIVE HEART FAILURE (H): ICD-10-CM

## 2024-07-22 LAB
MDC_IDC_EPISODE_DTM: NORMAL
MDC_IDC_EPISODE_ID: NORMAL
MDC_IDC_EPISODE_TYPE: NORMAL
MDC_IDC_LEAD_CONNECTION_STATUS: NORMAL
MDC_IDC_LEAD_IMPLANT_DT: NORMAL
MDC_IDC_LEAD_LOCATION: NORMAL
MDC_IDC_LEAD_LOCATION_DETAIL_1: NORMAL
MDC_IDC_LEAD_MFG: NORMAL
MDC_IDC_LEAD_MODEL: NORMAL
MDC_IDC_LEAD_POLARITY_TYPE: NORMAL
MDC_IDC_LEAD_SERIAL: NORMAL
MDC_IDC_MSMT_BATTERY_DTM: NORMAL
MDC_IDC_MSMT_BATTERY_REMAINING_LONGEVITY: 120 MO
MDC_IDC_MSMT_BATTERY_REMAINING_PERCENTAGE: 100 %
MDC_IDC_MSMT_BATTERY_STATUS: NORMAL
MDC_IDC_MSMT_CAP_CHARGE_DTM: NORMAL
MDC_IDC_MSMT_CAP_CHARGE_TIME: 10.6 S
MDC_IDC_MSMT_CAP_CHARGE_TYPE: NORMAL
MDC_IDC_MSMT_LEADCHNL_RV_IMPEDANCE_VALUE: 329 OHM
MDC_IDC_MSMT_LEADCHNL_RV_PACING_THRESHOLD_AMPLITUDE: 1 V
MDC_IDC_MSMT_LEADCHNL_RV_PACING_THRESHOLD_PULSEWIDTH: 0.4 MS
MDC_IDC_PG_IMPLANT_DTM: NORMAL
MDC_IDC_PG_MFG: NORMAL
MDC_IDC_PG_MODEL: NORMAL
MDC_IDC_PG_SERIAL: NORMAL
MDC_IDC_PG_TYPE: NORMAL
MDC_IDC_SESS_CLINIC_NAME: NORMAL
MDC_IDC_SESS_DTM: NORMAL
MDC_IDC_SESS_TYPE: NORMAL
MDC_IDC_SET_BRADY_LOWRATE: 40 {BEATS}/MIN
MDC_IDC_SET_BRADY_MODE: NORMAL
MDC_IDC_SET_LEADCHNL_RV_PACING_AMPLITUDE: 2.4 V
MDC_IDC_SET_LEADCHNL_RV_PACING_POLARITY: NORMAL
MDC_IDC_SET_LEADCHNL_RV_PACING_PULSEWIDTH: 0.4 MS
MDC_IDC_SET_LEADCHNL_RV_SENSING_ADAPTATION_MODE: NORMAL
MDC_IDC_SET_LEADCHNL_RV_SENSING_POLARITY: NORMAL
MDC_IDC_SET_LEADCHNL_RV_SENSING_SENSITIVITY: 0.6 MV
MDC_IDC_SET_ZONE_DETECTION_INTERVAL: 300 MS
MDC_IDC_SET_ZONE_DETECTION_INTERVAL: 353 MS
MDC_IDC_SET_ZONE_STATUS: NORMAL
MDC_IDC_SET_ZONE_STATUS: NORMAL
MDC_IDC_SET_ZONE_TYPE: NORMAL
MDC_IDC_SET_ZONE_TYPE: NORMAL
MDC_IDC_SET_ZONE_VENDOR_TYPE: NORMAL
MDC_IDC_SET_ZONE_VENDOR_TYPE: NORMAL
MDC_IDC_STAT_BRADY_DTM_END: NORMAL
MDC_IDC_STAT_BRADY_DTM_START: NORMAL
MDC_IDC_STAT_BRADY_RV_PERCENT_PACED: 0 %
MDC_IDC_STAT_EPISODE_RECENT_COUNT: 0
MDC_IDC_STAT_EPISODE_RECENT_COUNT_DTM_END: NORMAL
MDC_IDC_STAT_EPISODE_RECENT_COUNT_DTM_START: NORMAL
MDC_IDC_STAT_EPISODE_TYPE: NORMAL
MDC_IDC_STAT_EPISODE_VENDOR_TYPE: NORMAL
MDC_IDC_STAT_TACHYTHERAPY_ATP_DELIVERED_RECENT: 0
MDC_IDC_STAT_TACHYTHERAPY_ATP_DELIVERED_TOTAL: 1
MDC_IDC_STAT_TACHYTHERAPY_RECENT_DTM_END: NORMAL
MDC_IDC_STAT_TACHYTHERAPY_RECENT_DTM_START: NORMAL
MDC_IDC_STAT_TACHYTHERAPY_SHOCKS_ABORTED_RECENT: 0
MDC_IDC_STAT_TACHYTHERAPY_SHOCKS_ABORTED_TOTAL: 0
MDC_IDC_STAT_TACHYTHERAPY_SHOCKS_DELIVERED_RECENT: 0
MDC_IDC_STAT_TACHYTHERAPY_SHOCKS_DELIVERED_TOTAL: 0
MDC_IDC_STAT_TACHYTHERAPY_TOTAL_DTM_END: NORMAL
MDC_IDC_STAT_TACHYTHERAPY_TOTAL_DTM_START: NORMAL

## 2024-07-22 RX ORDER — AMOXICILLIN 500 MG/1
2000 TABLET, FILM COATED ORAL PRN
Qty: 4 TABLET | Refills: 2 | Status: SHIPPED | OUTPATIENT
Start: 2024-07-22

## 2024-07-22 NOTE — TELEPHONE ENCOUNTER
amoxicillin (AMOXIL) 500 MG tablet   Last Written Prescription Date:  10/6/2023  Last Fill Quantity: 4,   # refills: 0  Last Office Visit : 4/10/2024  [facility]  Future Office visit:  7/24/2024  Routing amoxicillin (AMOXIL) 500 MG tablet refill request to provider for review/approval because: Medication-Antibiotic not on the refill protocol.

## 2024-07-24 ENCOUNTER — ANTICOAGULATION THERAPY VISIT (OUTPATIENT)
Dept: ANTICOAGULATION | Facility: CLINIC | Age: 59
End: 2024-07-24

## 2024-07-24 ENCOUNTER — OFFICE VISIT (OUTPATIENT)
Dept: CARDIOLOGY | Facility: CLINIC | Age: 59
End: 2024-07-24
Attending: PHYSICIAN ASSISTANT
Payer: COMMERCIAL

## 2024-07-24 ENCOUNTER — LAB (OUTPATIENT)
Dept: LAB | Facility: CLINIC | Age: 59
End: 2024-07-24
Attending: PHYSICIAN ASSISTANT
Payer: COMMERCIAL

## 2024-07-24 ENCOUNTER — OFFICE VISIT (OUTPATIENT)
Dept: PULMONOLOGY | Facility: CLINIC | Age: 59
End: 2024-07-24
Payer: COMMERCIAL

## 2024-07-24 VITALS — BODY MASS INDEX: 29.96 KG/M2 | WEIGHT: 191.3 LBS | OXYGEN SATURATION: 97 % | SYSTOLIC BLOOD PRESSURE: 84 MMHG

## 2024-07-24 DIAGNOSIS — I50.22 CHRONIC SYSTOLIC HEART FAILURE (H): ICD-10-CM

## 2024-07-24 DIAGNOSIS — Z79.01 ANTICOAGULATED ON COUMADIN: ICD-10-CM

## 2024-07-24 DIAGNOSIS — Z95.811 LVAD (LEFT VENTRICULAR ASSIST DEVICE) PRESENT (H): Primary | ICD-10-CM

## 2024-07-24 DIAGNOSIS — I50.22 CHRONIC SYSTOLIC CONGESTIVE HEART FAILURE (H): ICD-10-CM

## 2024-07-24 DIAGNOSIS — Z95.811 LEFT VENTRICULAR ASSIST DEVICE PRESENT (H): ICD-10-CM

## 2024-07-24 DIAGNOSIS — Z95.811 LVAD (LEFT VENTRICULAR ASSIST DEVICE) PRESENT (H): ICD-10-CM

## 2024-07-24 DIAGNOSIS — I50.23 ACUTE ON CHRONIC SYSTOLIC CONGESTIVE HEART FAILURE (H): Primary | ICD-10-CM

## 2024-07-24 DIAGNOSIS — Z79.899 LONG TERM USE OF DRUG: ICD-10-CM

## 2024-07-24 LAB
ALBUMIN SERPL BCG-MCNC: 4.7 G/DL (ref 3.5–5.2)
ALP SERPL-CCNC: 90 U/L (ref 40–150)
ALT SERPL W P-5'-P-CCNC: 30 U/L (ref 0–70)
ANION GAP SERPL CALCULATED.3IONS-SCNC: 12 MMOL/L (ref 7–15)
AST SERPL W P-5'-P-CCNC: 35 U/L (ref 0–45)
BILIRUB SERPL-MCNC: 0.7 MG/DL
BUN SERPL-MCNC: 18.6 MG/DL (ref 6–20)
CALCIUM SERPL-MCNC: 9.5 MG/DL (ref 8.8–10.4)
CHLORIDE SERPL-SCNC: 96 MMOL/L (ref 98–107)
CREAT SERPL-MCNC: 1.36 MG/DL (ref 0.67–1.17)
EGFRCR SERPLBLD CKD-EPI 2021: 61 ML/MIN/1.73M2
ERYTHROCYTE [DISTWIDTH] IN BLOOD BY AUTOMATED COUNT: 17.7 % (ref 10–15)
GLUCOSE SERPL-MCNC: 118 MG/DL (ref 70–99)
HCO3 SERPL-SCNC: 30 MMOL/L (ref 22–29)
HCT VFR BLD AUTO: 49.4 % (ref 40–53)
HGB BLD-MCNC: 15.1 G/DL (ref 13.3–17.7)
INR PPP: 1.91 (ref 0.85–1.15)
LDH SERPL L TO P-CCNC: 278 U/L (ref 0–250)
MCH RBC QN AUTO: 26.4 PG (ref 26.5–33)
MCHC RBC AUTO-ENTMCNC: 30.6 G/DL (ref 31.5–36.5)
MCV RBC AUTO: 87 FL (ref 78–100)
NT-PROBNP SERPL-MCNC: 4114 PG/ML (ref 0–900)
PLATELET # BLD AUTO: 245 10E3/UL (ref 150–450)
POTASSIUM SERPL-SCNC: 4.4 MMOL/L (ref 3.4–5.3)
PROT SERPL-MCNC: 7.7 G/DL (ref 6.4–8.3)
RBC # BLD AUTO: 5.71 10E6/UL (ref 4.4–5.9)
SODIUM SERPL-SCNC: 138 MMOL/L (ref 135–145)
WBC # BLD AUTO: 14.8 10E3/UL (ref 4–11)

## 2024-07-24 PROCEDURE — 85027 COMPLETE CBC AUTOMATED: CPT | Performed by: PATHOLOGY

## 2024-07-24 PROCEDURE — 80053 COMPREHEN METABOLIC PANEL: CPT | Performed by: PATHOLOGY

## 2024-07-24 PROCEDURE — 36415 COLL VENOUS BLD VENIPUNCTURE: CPT | Performed by: PATHOLOGY

## 2024-07-24 PROCEDURE — 85610 PROTHROMBIN TIME: CPT | Performed by: PATHOLOGY

## 2024-07-24 PROCEDURE — 93750 INTERROGATION VAD IN PERSON: CPT | Performed by: PHYSICIAN ASSISTANT

## 2024-07-24 PROCEDURE — 99214 OFFICE O/P EST MOD 30 MIN: CPT | Mod: 25 | Performed by: PHYSICIAN ASSISTANT

## 2024-07-24 PROCEDURE — 83615 LACTATE (LD) (LDH) ENZYME: CPT | Performed by: PATHOLOGY

## 2024-07-24 PROCEDURE — G0463 HOSPITAL OUTPT CLINIC VISIT: HCPCS | Performed by: PHYSICIAN ASSISTANT

## 2024-07-24 PROCEDURE — 83880 ASSAY OF NATRIURETIC PEPTIDE: CPT | Performed by: PATHOLOGY

## 2024-07-24 RX ORDER — NALOXONE HYDROCHLORIDE 4 MG/.1ML
4 SPRAY NASAL
COMMUNITY
Start: 2024-04-02 | End: 2024-09-03

## 2024-07-24 RX ORDER — WARFARIN SODIUM 5 MG/1
2.5-5 TABLET ORAL DAILY
Status: ON HOLD | COMMUNITY
Start: 2024-07-05 | End: 2024-09-06

## 2024-07-24 RX ORDER — SACUBITRIL AND VALSARTAN 24; 26 MG/1; MG/1
0.5 TABLET, FILM COATED ORAL 2 TIMES DAILY
Qty: 45 TABLET | Refills: 0 | Status: ON HOLD | OUTPATIENT
Start: 2024-07-24 | End: 2024-09-06

## 2024-07-24 RX ORDER — SACUBITRIL AND VALSARTAN 24; 26 MG/1; MG/1
1 TABLET, FILM COATED ORAL 2 TIMES DAILY
Qty: 30 TABLET | Refills: 0 | Status: SHIPPED | OUTPATIENT
Start: 2024-07-24 | End: 2024-07-24

## 2024-07-24 ASSESSMENT — PAIN SCALES - GENERAL: PAINLEVEL: NO PAIN (0)

## 2024-07-24 NOTE — LETTER
7/24/2024      RE: Akshat Fragoso  [address]  [facility] [address]       Dear Colleague,    Thank you for the opportunity to participate in the care of your patient, Akshat Fragoso, at the [facility] [place] at [facility]. Please see a copy of my visit note below.    LVAD Clinic  RABIA follow-up     Akshat Fragoso is a 59 year old male with history of HFrEF 2/2 NICM (diagnosed in 1/2017) s/p ICD placement in 6/2017, VT/VF arrest on 3/15/2023 requiring CPR for 6 mins with cardiogenic shock s/p HM3 LVAD implantation as DT 3/23/2023 c/b postop AF (on amiodarone and digoxin) and HAP, moderate TR s/p TV annuloplasty with 32 mm MC3 partial ring 3/23/2023, PFO closure 3/23/2023, chronic tobacco/marijuana use, COPD, HTN, CKD stage III, who presents today for post LVAD implant follow up.     He was admitted on 3/15/2023 for cardiac arrest in the setting of VT that was slower than his programmed VT threshold for intervention. Patient had been undergoing workup at [facility] for LVAD as destination therapy (initially evaluated for transplant though patient having trouble quitting marijuana/tobacco use). He underwent HM3 LVAD implantation on 3/23/2023 with postop course c/b AF with RVR requiring amiodarone gtt, volume overload, and HAP treated with IV vancomycin and zosyn for 5 days. He was discharged to home on 4/9/2023.     He last saw Dr. Aguiar 4/2/24. He will let us know when he is ready for routine testing. Speed was increased to 5200. No other medication changes.    Today  Fatigue is much better. He does get some JOHNSON. Can walk about 1 block -that is improved.No LE edema. No abdominal edema. No orthopnea. No PND. No lightheadedness or dizziness unless he stands up too quick. This happens especially if he took his bumex that day. No pre-syncope or syncope. No palpitations. No chest pain. Appetite is good. No alarms on the lvad.     No blood in the urine or  blood in the stool. No prolonged nosebleeds.      No driveline concerns. No fever or chills.     Headaches are much better. Takes some tylenol. No stroke symptoms.      Weight at home without the vad has been 183 lbs.     Current cardiac medications  - Lisinopril 5 mg daily  - Inspra 12.5 m gdaily  - Empagliflozin 10 mg daily  - Bumex 1 mg qod  - Digoxin 250 mcg daily  - Amiodarone 100 mg daily  - Warfarin (INR goal 2-3)      PAST MEDICAL HISTORY:  Past Medical History:   Diagnosis Date     Acute right lumbar radiculopathy 11/02/2015     Acute systolic heart failure (H) 01/10/2017     Cardiomyopathy (H) 01/10/2017     CKD (chronic kidney disease) stage 3, GFR 30-59 ml/min (H) 01/30/2020     JOHNSON (dyspnea on exertion)      ED (erectile dysfunction) 10/02/2019     Erectile dysfunction      ICD (implantable cardioverter-defibrillator) in place- Unifysquare, single chamber- NOT dependent 10/09/2017     Personal history of smoking 01/04/2017     Pulmonary nodules 01/17/2017    CT 11/2018:  Bilateral pulmonary nodules measuring up to 4 mm. No new or enlarging pulmonary nodules.     FAMILY HISTORY:  Family History   Problem Relation Age of Onset     Parkinsonism Mother      Atrial fibrillation Father      Heart Failure Father      Prostate Cancer Father      Skin Cancer Father      Anorexia nervosa Daughter      Other - See Comments Granddaughter         premature birth     SOCIAL HISTORY:  Social History     Socioeconomic History     Marital status:      Spouse name: Cheryl     Number of children: 2   Occupational History     Occupation: Construction      Employer: SELF   Tobacco Use     Smoking status: Former     Packs/day: 0.25     Years: 15.00     Pack years: 3.75     Types: Cigarettes     Smokeless tobacco: Former     Quit date: 3/15/2023   Vaping Use     Vaping status: Never Used   Substance and Sexual Activity     Alcohol use: No     Alcohol/week: 0.0 standard drinks of alcohol     Drug use: No      Sexual activity: Yes     Partners: Female     Birth control/protection: Condom   Other Topics Concern     Parent/sibling w/ CABG, MI or angioplasty before 65F 55M? Yes     Comment: both parents had stints placed      CURRENT MEDICATIONS:  Current Outpatient Medications   Medication Sig Dispense Refill     acetaminophen (TYLENOL) 325 MG tablet Take 2 tablets (650 mg) by mouth every 4 hours as needed for other (For optimal non-opioid multimodal pain management to improve pain control.) 100 tablet 0     amiodarone (PACERONE) 200 MG tablet Take 0.5 tablets (100 mg) by mouth daily 45 tablet 1     amoxicillin (AMOXIL) 500 MG tablet Take 4 tablets (2,000 mg) by mouth as needed (Take one hour before dental procedure.) 4 tablet 2     bumetanide (BUMEX) 1 MG tablet Take 1 tablet (1 mg) by mouth every other day 45 tablet 3     digoxin (LANOXIN) 250 MCG tablet Take 1 tablet (250 mcg) by mouth daily 30 tablet 11     empagliflozin (JARDIANCE) 10 MG TABS tablet Take 1 tablet (10 mg) by mouth daily 90 tablet 3     eplerenone (INSPRA) 25 MG tablet Take 12.5mg (1/2 tab) daily 45 tablet 3     JANTOVEN ANTICOAGULANT 5 MG tablet        NARCAN 4 MG/0.1ML nasal spray Spray 4 mg into one nostril alternating nostrils once as needed       pantoprazole (PROTONIX) 20 MG EC tablet Take 1 tablet (20 mg) by mouth every morning (before breakfast) 90 tablet 3     sacubitril-valsartan (ENTRESTO) 24-26 MG per tablet Take 0.5 tablets by mouth 2 times daily Take 12-13mg (half tab) twice per day 45 tablet 0     sildenafil (VIAGRA) 50 MG tablet Take 1 tablet (50 mg) by mouth daily as needed (PRN for sexual activity) 30 tablet 0     No current facility-administered medications for this visit.     ROS:   See HPI     EXAM:  BP (!) 84/0 (BP Location: Right arm, Patient Position: Chair, Cuff Size: Adult Regular)   Wt 86.8 kg (191 lb 4.8 oz)   SpO2 97%   BMI 29.96 kg/m    GENERAL: Appears comfortable, in no acute distress.   HEENT: Eye symmetrical  CV: Hum  of Hm3, no adventitious sounds. JVP <6.   RESPIRATORY: Respirations regular, even, and unlabored. Lungs CTA throughout.   GI: Soft  and non distended.   EXTREMITIES: Trace b/l peripheral edema. All extremities are warm and well perfused  NEUROLOGIC: Alert and interacting appropriately. No focal deficits.   SKIN: No jaundice.  Driveline site c/d/i.       Labs:  Lab Results   Component Value Date    WBC 14.8 (H) 07/24/2024    HGB 15.1 07/24/2024    HCT 49.4 07/24/2024     07/24/2024     07/24/2024    POTASSIUM 4.4 07/24/2024    CHLORIDE 96 (L) 07/24/2024    CO2 30 (H) 07/24/2024    BUN 18.6 07/24/2024    CR 1.36 (H) 07/24/2024     (H) 07/24/2024    DD 3.60 (H) 04/18/2023    NTBNPI 31,087 (H) 03/20/2023    NTBNP 4,114 (H) 07/24/2024    TROPI 0.033 01/09/2017    AST 35 07/24/2024    ALT 30 07/24/2024    ALKPHOS 90 07/24/2024    BILITOTAL 0.7 07/24/2024    INR 1.91 (H) 07/24/2024     Diagnostics:  7/22/24 ICD check  Device: [employer] D150 DYNAGEN  Normal Device Function  Mode: VVI 40 bpm  : 0%  Presenting EGM: VS 58 bpm  Lead Trends Appear Stable.  Estimated battery longevity to RRT = 10 years.  Atrial Arrhythmia: N/A  Anticoagulant: Warfarin  Ventricular Arrhythmia: None  Pt Notified of Transmission Results: Velocomphart     4/10/24 ICD check  Device: [employer] D150 DYNAGEN  Normal device function  Mode: VVI 40 bpm  : <1%  Intrinsic rhythm: VS @ 83 bpm  Lead Trends Appear Stable: Yes  Estimated battery longevity to RRT = 10.5 years  Ventricular Arrhythmia: 0  Setting Changes: RV amplitude decreased to 2.4 V.  Patient has an appointment to see Dr. Montgomery today.   Plan: Device follow-up every 3 months.  DEL Gao RN    4/1/24 RHC  RA: --/--/15 mm Hg  RV: 60/15 mm Hg  PA: 60/30 (43) mm Hg  PCWP: --/--/24 mm Hg  CO/CI: 3.6/1.8 (TD); 3.3/1.7 (Carlos)   PVR: 5.2 (TD) LING   Right sided filling pressures are moderately elevated. Left sided filling pressures are moderately elevated. Moderately  elevated pulmonary artery hypertension. Reduced cardiac output level. Hemodynamic data has been modified in Epic per physician review.        December 27, 2023 echo  Borderline-dilated LV with severely reduced global LV function, LVEF<20%.  LVIDd=5/8 cm.  RV function appears moderately reduced on limited views.  The aortic valve appears to remains closed on limited views. There is no AI.  LVAD inflow cannula is visualized in the LV apex. LVAD outflow graft is  visualized in the aorta. Normal Doppler interrogation of the LVAD inflow  cannula and outflow graft.  This study was compared with the study from 5/3/23: No significant changes  noted.  ___________    8/7/23 RHC  RA 8  RV 39/8  PA 39/15/24  Wedge 7  Cardiac output 4.3, cardiac index 2.3 by thermo  Cardiac output 3,   cardiac index 1.59 by Carlos  PVR: 2 Right sided filling pressures are normal. Left sided filling pressures are normal. Mild elevated pulmonary hypertension. Normal cardiac output level.     Pressures Phase: Baseline     Time Systolic (mmHg) Diastolic (mmHg) Mean (mmHg) A Wave (mmHg) V Wave (mmHg) EDP (mmHg) Max dp/dt (mmHg/sec) HR (bpm) Content (mL/dL) SAT (%)   RA Pressures 12:09 PM   8    9    13      75        RV Pressures 12:15 PM 39        9     74        PA Pressures 12:16 PM 39    15    24        74        PCW Pressures 12:15 PM   7        75        Art Pressures 11:59 AM 99    75    83        77          Blood Flow Results Phase: Baseline     Time Results Indexed Values (L/min/m2)   QP 11:55 AM 3.07 L/min    1.59      QS 11:55 AM 3.07 L/min    1.59        Blood Oximetry Phase: Baseline       Time Hb SAT(%) PO2 Content (mL/dL) PA Sat (%)   PA 11:55 AM  59 %      59      Art 11:55 AM  100 %     19.18              TTE 3/28/2023  Technically difficult study.Extremely poor acoustic windows.  Limited information was obtained during study.  LVAD cannula was seen in the expected anatomic position in the LV apex.  HM3 at 5200RPM.  Septum  normal.  LVIDd 56mm.  Aortic valve not well visualized. No AI.  Normal outflow velocity.  Global right ventricular function is moderately to severely reduced.  Small pericardial effusion with organizing material in pericardial cavity.     TTE 5/3/23:  Please refer to the EPIC report for measurements performed at different LVAD speed settings.  HM3 at 5200RPM at baseline.  LVIDd 43mm.  Septum normal.  Aortic valve remain closed at baseline.     Assessment and Plan:   Akshat Fragoso is a 59 year old male with history of HFrEF 2/2 NICM (diagnosed in 1/2017) s/p ICD placement in 6/2017, VT/VF arrest on 3/15/2023 requiring CPR for 6 mins with cardiogenic shock s/p HM3 LVAD implantation as DT 3/23/2023 c/b postop AF (on amiodarone and digoxin) and HAP, moderate TR s/p TV annuloplasty with 32 mm MC3 partial ring 3/23/2023, PFO closure 3/23/2023, chronic tobacco/marijuana use, COPD, HTN, CKD stage III, who presents today for post LVAD implant follow up.    He is doing well. He is more interested in transplant today (has previously not been sure if he would want to go through with evaluation and transplant.). He wanted to know next steps and we discussed that he would need to stop cannabis (per current program policy). Advised that he work with other providers to establish effective ancity management plan as he comes off that. He will let us know when he has stopped for 3-4 weeks and then we would begin testing.  Needs to be off tobacco as well.    He does have a leukocytosis today- no symptoms other than a known dental abscess for which he is on antibiotics. This is the most likely cause. We will check in 1-2 weeks and we also discussed to watch for new symptoms.    GDMT previously limited by bps, although now we have some room.     # Chronic systolic heart failure secondary to NICM with cardiogenic shock s/p HM III on 3/23/23 LVAD. ACC/AHA Stage D, NYHA Class III  Moderate TR s/p TV annuloplasty with 32 mm MC3 partial ring  3/23/2023.  Has a history of NICM diagnosed in 1/2017 and underwent ICD for primary SCD prophylaxis in 4/2017. Patient had been undergoing workup at [place] for LVAD as destination therapy (initially evaluated for transplant though patient having trouble quitting marijuana/tobacco use). He had cardiac arrest in the setting of VT that was slower than his programmed VT threshold for intervention on 3/15/2023. Received CPR for 6 mins with ROSC and developed cardiogenic shock. During hospitalization, he underwent HM3 LVAD implantation on 3/23/2023 with postop course c/b AF with RVR requiring amiodarone gtt, volume overload, and HAP treated with IV vancomycin and zosyn for 5 days. He was discharged to home on 4/9/2023.    Fluid status euvolemic- continue bumex 1 mg every other day although it sounds like he gets a bit dizzy the days after he takes this, so he is going to try bumex 0.5 mg a few times and see if that does adequate diuresis without making him dizzy. He will let us know next week.  ACEi/ARB stop lisinopril. After 36 hours can start entresto 12-13 mg BID.   BB deferred given recent LVAD implant  Aldosterone antagonist Inspra 12.5 mg daily  SCD prophylaxis ICD  NSAID use: contraindicated  Sleep Apnea Evaluation: not discussed today  BP: MAP goal 65-85, within goal at 84 today  LDH trends: 278, stable, still establishing b/l  Anticoagulation: warfarin INR goal 2-3, today 1.91  Antiplatelet: Off ASA    VAD Interrogation on July 24, 2024 VAD interrogation reviewed with VAD coordinator. Agree with findings. Occasional PI events. No power spikes, speed drops, or other findings suspicious of pump malfunction noted.       # Persistent lukocytosis.   ID noted no indication for long term antibiotics inpatient given no acute findings on CT while inpatient (he had been treated for HAP with IV antibiotics while inpatient). No signs or symptoms of infection today.  He did have a pre-op luekocytosis longstanding around 11-12,  if persistent leukocytosis after the acute post operative period could consider hematology. He did improve into the 11s, but now up to 14.9. no symptoms other than a known dental abscess for which he is on antibiotics. This is the most likely cause. We will check in 1-2 weeks and we also discussed to watch for new symptoms.  - Recheck in 1-2 weeks  - He knows to call for any new symptoms    # Afib with RVR, resolved.   - Continue Amiodarone and Digoxin.  - Warfarin with INR goal 2-3, INR is 1.91    # Moderate TR s/p TV annuloplasty with 32 mm MC3 partial ring 3/23/2023  PFO closure 3/23/2023  - Trend on ECHOs    # Chronic tobacco/marijuana use  - Discussed today: He is more interested in transplant today (has previously not been sure if he would want to go through with evaluation and transplant.). He wanted to know next steps and we discussed that he would need to stop cannabis (per current program policy). Advised that he work with other providers to establish effective ancity management plan as he comes off that. He will let us know when he has stopped for 3-4 weeks and then we would begin testing.  Needs to be off tobacco as well.     # HTN.   - MAP within goal, continue the above regimen    Follow-up:  - BMP 1-2 weeks after changing from lisinopril to entresto  - Pt will report and improvements or worsening to his symptoms after trying slightly lower bumex dose  - CBC 1-2 weeks (ideally after the ental work)  - Dr. Aguiar in 3 months  - VAD RABIA in 6 months     Billing  - I managed 2 stable chronic conditions  - I reviewed 4 tests  - I changed a medication    The longitudinal plan of care for the diagnosis(es)/condition(s) as documented were addressed during this visit. Due to the added complexity in care, I will continue to support Akshat in the subsequent management and with ongoing continuity of care.    Elba Chen PA-C  Advacned Heart Failure  [facility]         Please do not hesitate to contact me  if you have any questions/concerns.     Sincerely,     Elba Chen PA-C

## 2024-07-24 NOTE — PROGRESS NOTES
Return call received from Akshat and his spouse, Landry, reporting that Akshat started Amoxicillin yesterday for a 10 day course.     Concurrent use of AMOXICILLIN and WARFARIN may result in an increased risk of bleeding.    No booster dose advised today. Continue current maintenance dose, close monitoring for bleeding, and recheck INR 5-7 days with home meter. Priority status changed to high.     Aksaht and Landry verbalize understanding of plan.    TREV LYN RN  Anticoagulation Clinic  [phone]

## 2024-07-24 NOTE — NURSING NOTE
MCS VAD Pump Info       Row Name 07/24/24 1400             MCS VAD Information    Implant LVAD      LVAD Pump HeartMate 3         Heartmate 3 LEFT VS    Flow (Lpm) 4.1      Pulse Index (PI) 4.3      Speed (rpm) 5100      Power (mari) 3.7      Current Hct setting 49      Retired: Unexpected Alarms none         Primary Controller    Serial [place]  [id number]      Low flow alarm setting 2.5      High watt alarm setting NA      EBB: Patient use 13      Replace in 14         Backup Controller    Serial number [id number]      EBB: Patient use 4      Replace EBB in 15 Months      Speed & HCT match primary controller --         VAD Interrogation    Alarms reported by patient none      Unexpected alarms noted upon interrogation none      PI events Some with speed drops      Damage to equipment is noted none      Action taken --         Driveline Exit Site    Dressing change done no      Driveline properly secured yes      DLES assessment Per patient there is no drainage and no redness      Dressing used daily      Frequency patient changes dressing 3-4x/week      Dressing modifications --      Dressing change supplier --                      Education Complete: Yes   Charge the BACKUP controller s backup battery every 6 months  Perform a self test on BACKUP every 6 months  Change the MPU s batteries every 6 months:Yes  Have equipment serviced yearly (if applicable):Yes but today.    Reviewed Heartmate battery maintenance. Hand out given to patient regarding weekly, monthly, and yearly maintenance.

## 2024-07-24 NOTE — NURSING NOTE
Chief Complaint   Patient presents with    Follow Up     Return VAD     Vitals were taken and medications reconciled.    Lex Babb, EMT  2:08 PM

## 2024-07-24 NOTE — PATIENT INSTRUCTIONS
"Medications:  Please start taking Entresto 12-13mg twice per day. (This is a 24-26mg tab cut in half).  Start taking Entresto after being off of Lisinopril for 3 days.  In the next 7 day period, Try taking Bumex 1/2 tab (0.5mg) on two days instead of a full tab. Please call Rosita to report your symptoms.    Instructions:  Please get labs (CBC and BMP) in two week. I placed the order for the week of 8/12.  Bring your battery charger cristin the clinic for servicing at your leisure before 2pm on a weekday.    Follow-up: (make these appointments before you leave)  1. Please follow-up with VAD RABIA in 6 months with labs prior.   2. Please follow-up with Dr. Aguiar on 9/3/2024 with labs prior.      Equipment Maintenance Reminders:   Charge your back-up controller at least every 6 months. To charge, connect it to either batteries or wall power. The screen will read \"Charging\". Keep connected until the screen reads \"charging complete\". Disconnect power once the controller battery is charged. Also do a self-test when the controller is connected to power.  Check your battery charger for when it is due for maintenance. It requires inspection in clinic once per year. There will be a sticker stating the month and year maintenance is due. When you bring your battery charger to clinic, tell one of the schedulers you have LVAD equipment that needs maintenance. They will call [employer]. You can leave your  under the LVAD sign by the  at the far end of clinic. You must drop it off before 2pm.   Replace the AA batteries in your mobile power unit every 6 months.       Page the VAD Coordinator on call if you gain more than 3 lb in a day or 5 in a week. Please also page if you feel unwell or have alarms.   Great to see you in clinic today. To Page the VAD Coordinator on call, dial [phone] option #4 and ask to speak to the VAD coordinator on call.      Camille Melvin the 's number is: [phone]  "

## 2024-07-24 NOTE — PROGRESS NOTES
ANTICOAGULATION MANAGEMENT     Akshat Fragoso 59 year old male is on warfarin with subtherapeutic INR result. (Goal INR 2.0-3.0)    Recent labs: (last 7 days)     07/24/24  1251   INR 1.91*       ASSESSMENT     Source(s): Chart Review  Previous INR was Therapeutic last 2(+) visits  Medication, diet, health changes since last INR chart reviewed; none identified    Cards/Pulm OV today:    Medications:  Please start taking Entresto 12-13mg twice per day. (This is a 24-26mg tab cut in half).  Start taking Entresto after being off of Lisinopril for 3 days.  In the next 7 day period, Try taking Bumex 1/2 tab (0.5mg) on two days instead of a full tab. Please call Rosita to report your symptoms.     No drug drug interaction between Entresto and Warfarin.      PLAN     Recommended plan for no diet, medication or health factor changes affecting INR     Dosing Instructions: Continue your current warfarin dose with next INR in 1 week       Summary  As of 7/24/2024      Full warfarin instructions:  5 mg every Mon, Wed, Fri; 2.5 mg all other days   Next INR check:  7/31/2024               Detailed voice message left for Akshat with dosing instructions and follow up date.   Sent [employer] message with dosing and follow up instructions    Patient to recheck with home meter    Education provided: Please call back if any changes to your diet, medications or how you've been taking warfarin  Goal range and lab monitoring: goal range and significance of current result and Importance of following up at instructed interval  Interaction IS NOT anticipated between warfarin and Enstresto  Contact [phone] with any changes, questions or concerns.     Plan made per [facility] anticoagulation protocol and per LVAD protocol    TREV LYN, RN  Anticoagulation Clinic  7/24/2024    _______________________________________________________________________     Anticoagulation Episode Summary       Current INR goal:  2.0-3.0   TTR:  85.0% (1 y)   Target end  date:  Indefinite   Send INR reminders to:  ANTICOAG LVAD    Indications    Acute on chronic systolic congestive heart failure (H) [I50.23]  LVAD (left ventricular assist device) present (H) [Z95.811]  Chronic systolic congestive heart failure (H) [I50.22]  Long term use of drug [Z79.899]  Left ventricular assist device present (H) [Z95.811]  Anticoagulated on Coumadin [Z79.01]             Comments:  Follow VAD Anticoag protocol:Yes: HeartMate 3   Bridging: Enoxaparin   Date VAD placed: 3/23/23             Anticoagulation Care Providers       Provider Role Specialty Phone number    Kenzie Moreau MD Referring Advanced Heart Failure and Transplant Cardiology [phone]    Mile Bolivar, APRN CNP Referring Nurse Practitioner [phone]    Shaun Aguiar MD Referring Cardiovascular Disease [phone]

## 2024-07-25 LAB
DLCOUNC-%PRED-PRE: 57 %
DLCOUNC-PRE: 14.86 ML/MIN/MMHG
DLCOUNC-PRED: 25.7 ML/MIN/MMHG
ERV-%PRED-PRE: 37 %
ERV-PRE: 0.54 L
ERV-PRED: 1.44 L
EXPTIME-PRE: 11.85 SEC
FEF2575-%PRED-PRE: 11 %
FEF2575-PRE: 0.33 L/SEC
FEF2575-PRED: 2.78 L/SEC
FEFMAX-%PRED-PRE: 29 %
FEFMAX-PRE: 2.56 L/SEC
FEFMAX-PRED: 8.75 L/SEC
FEV1-%PRED-PRE: 33 %
FEV1-PRE: 1.04 L
FEV1FEV6-PRE: 46 %
FEV1FEV6-PRED: 79 %
FEV1FVC-PRE: 38 %
FEV1FVC-PRED: 79 %
FEV1SVC-PRE: 36 %
FEV1SVC-PRED: 74 %
FIFMAX-PRE: 4.18 L/SEC
FRCPLETH-%PRED-PRE: 149 %
FRCPLETH-PRE: 5.11 L
FRCPLETH-PRED: 3.41 L
FVC-%PRED-PRE: 68 %
FVC-PRE: 2.71 L
FVC-PRED: 3.93 L
IC-%PRED-PRE: 82 %
IC-PRE: 2.36 L
IC-PRED: 2.88 L
RVPLETH-%PRED-PRE: 202 %
RVPLETH-PRE: 4.57 L
RVPLETH-PRED: 2.26 L
TLCPLETH-%PRED-PRE: 114 %
TLCPLETH-PRE: 7.47 L
TLCPLETH-PRED: 6.52 L
VA-%PRED-PRE: 82 %
VA-PRE: 4.87 L
VC-%PRED-PRE: 68 %
VC-PRE: 2.9 L
VC-PRED: 4.24 L

## 2024-07-25 RX ORDER — AMOXICILLIN 500 MG/1
TABLET, FILM COATED ORAL
Qty: 4 TABLET | Refills: 0 | OUTPATIENT
Start: 2024-07-25

## 2024-07-26 ENCOUNTER — ANTICOAGULATION THERAPY VISIT (OUTPATIENT)
Dept: ANTICOAGULATION | Facility: CLINIC | Age: 59
End: 2024-07-26
Payer: COMMERCIAL

## 2024-07-26 DIAGNOSIS — Z95.811 LVAD (LEFT VENTRICULAR ASSIST DEVICE) PRESENT (H): ICD-10-CM

## 2024-07-26 DIAGNOSIS — Z79.01 ANTICOAGULATED ON COUMADIN: ICD-10-CM

## 2024-07-26 DIAGNOSIS — I50.23 ACUTE ON CHRONIC SYSTOLIC CONGESTIVE HEART FAILURE (H): Primary | ICD-10-CM

## 2024-07-26 DIAGNOSIS — I50.22 CHRONIC SYSTOLIC CONGESTIVE HEART FAILURE (H): ICD-10-CM

## 2024-07-26 DIAGNOSIS — Z95.811 LEFT VENTRICULAR ASSIST DEVICE PRESENT (H): ICD-10-CM

## 2024-07-26 DIAGNOSIS — Z79.899 LONG TERM USE OF DRUG: ICD-10-CM

## 2024-07-26 LAB — INR HOME MONITORING: 2.3 (ref 2–3)

## 2024-07-26 NOTE — PROGRESS NOTES
ANTICOAGULATION MANAGEMENT     Akshat Fragoso 59 year old male is on warfarin with therapeutic INR result. (Goal INR 2.0-3.0)    Recent labs: (last 7 days)     07/26/24  0000   INR 2.3       ASSESSMENT     Source(s): Chart Review     Warfarin doses taken: Reviewed in chart  Diet: No new diet changes identified  Medication/supplement changes:  on amoxicillin x 10 days; started 7/23/24  New illness, injury, or hospitalization: No  Signs or symptoms of bleeding or clotting: No  Previous result: Subtherapeutic  Additional findings: None       PLAN     Recommended plan for no diet, medication or health factor changes affecting INR     Dosing Instructions: Continue your current warfarin dose with next INR in 1 week       Summary  As of 7/26/2024      Full warfarin instructions:  5 mg every Mon, Wed, Fri; 2.5 mg all other days   Next INR check:  8/2/2024               Detailed voice message left for Akshat with dosing instructions and follow up date.     Patient to recheck with home meter    Education provided: Contact [phone] with any changes, questions or concerns.     Plan made per [facility] anticoagulation protocol and per LVAD protocol    Yessenia Mcfarland RN  Anticoagulation Clinic  7/26/2024    _______________________________________________________________________     Anticoagulation Episode Summary       Current INR goal:  2.0-3.0   TTR:  84.9% (1 y)   Target end date:  Indefinite   Send INR reminders to:  ANTICOAG LVAD    Indications    Acute on chronic systolic congestive heart failure (H) [I50.23]  LVAD (left ventricular assist device) present (H) [Z95.811]  Chronic systolic congestive heart failure (H) [I50.22]  Long term use of drug [Z79.899]  Left ventricular assist device present (H) [Z95.811]  Anticoagulated on Coumadin [Z79.01]             Comments:  Follow VAD Anticoag protocol:Yes: HeartMate 3   Bridging: Enoxaparin   Date VAD placed: 3/23/23             Anticoagulation Care Providers       Provider Role  Specialty Phone number    Kenzie Moreau MD Referring Advanced Heart Failure and Transplant Cardiology [phone]    Mile Bolivar, APRN CNP Referring Nurse Practitioner [phone]    Shaun gAuiar MD Referring Cardiovascular Disease [phone]

## 2024-07-26 NOTE — TELEPHONE ENCOUNTER
Signed 3 days ago (7/22/2024):   amoxicillin (AMOXIL) 500 MG tablet   Sig: Take 4 tablets (2,000 mg) by mouth as needed (Take one hour before dental procedure.)   Disp: 4 tablet    Refills: 2   Signed by: Shaun Aguiar MD

## 2024-08-07 ENCOUNTER — MYC MEDICAL ADVICE (OUTPATIENT)
Dept: ANTICOAGULATION | Facility: CLINIC | Age: 59
End: 2024-08-07
Payer: COMMERCIAL

## 2024-08-14 ENCOUNTER — ANTICOAGULATION THERAPY VISIT (OUTPATIENT)
Dept: ANTICOAGULATION | Facility: CLINIC | Age: 59
End: 2024-08-14
Payer: COMMERCIAL

## 2024-08-14 ENCOUNTER — MYC MEDICAL ADVICE (OUTPATIENT)
Dept: ANTICOAGULATION | Facility: CLINIC | Age: 59
End: 2024-08-14
Payer: COMMERCIAL

## 2024-08-14 DIAGNOSIS — Z79.899 LONG TERM USE OF DRUG: ICD-10-CM

## 2024-08-14 DIAGNOSIS — Z95.811 LVAD (LEFT VENTRICULAR ASSIST DEVICE) PRESENT (H): ICD-10-CM

## 2024-08-14 DIAGNOSIS — Z79.01 ANTICOAGULATED ON COUMADIN: ICD-10-CM

## 2024-08-14 DIAGNOSIS — I50.23 ACUTE ON CHRONIC SYSTOLIC CONGESTIVE HEART FAILURE (H): Primary | ICD-10-CM

## 2024-08-14 DIAGNOSIS — I50.22 CHRONIC SYSTOLIC CONGESTIVE HEART FAILURE (H): ICD-10-CM

## 2024-08-14 DIAGNOSIS — Z95.811 LEFT VENTRICULAR ASSIST DEVICE PRESENT (H): ICD-10-CM

## 2024-08-14 LAB — INR HOME MONITORING: 2.3 (ref 2–3)

## 2024-08-14 NOTE — PROGRESS NOTES
ANTICOAGULATION MANAGEMENT     Akshat Fragoso 59 year old male is on warfarin with therapeutic INR result. (Goal INR 2.0-3.0)    Recent labs: (last 7 days)     08/14/24  0000   INR 2.3       ASSESSMENT     Source(s): Chart Review     Warfarin doses taken: Reviewed in chart  Diet: No new diet changes identified  Medication/supplement changes: None noted  New illness, injury, or hospitalization: No  Signs or symptoms of bleeding or clotting: No  Previous result: Therapeutic last 2(+) visits    Additional findings: None       PLAN     Recommended plan for no diet, medication or health factor changes affecting INR     Dosing Instructions: Continue your current warfarin dose with next INR in 2 weeks       Summary  As of 8/14/2024      Full warfarin instructions:  5 mg every Mon, Wed, Fri; 2.5 mg all other days   Next INR check:  8/28/2024               Detailed voice message left for Akshat with dosing instructions and follow up date.   Sent [employer]bridge message with dosing and follow up instructions    Patient to recheck with home meter    Education provided: Please call back if any changes to your diet, medications or how you've been taking warfarin  Contact [phone] with any changes, questions or concerns.     Plan made per [facility] anticoagulation protocol and per LVAD protocol    Yessenia Mcfarland RN  Anticoagulation Clinic  8/14/2024    _______________________________________________________________________     Anticoagulation Episode Summary       Current INR goal:  2.0-3.0   TTR:  84.9% (1 y)   Target end date:  Indefinite   Send INR reminders to:  ANTICOAG LVAD    Indications    Acute on chronic systolic congestive heart failure (H) [I50.23]  LVAD (left ventricular assist device) present (H) [Z95.811]  Chronic systolic congestive heart failure (H) [I50.22]  Long term use of drug [Z79.899]  Left ventricular assist device present (H) [Z95.811]  Anticoagulated on Coumadin [Z79.01]             Comments:  Follow VAD Anticoag  protocol:Yes: HeartMate 3   Bridging: Enoxaparin   Date VAD placed: 3/23/23             Anticoagulation Care Providers       Provider Role Specialty Phone number    Kenzie Moreau MD Referring Advanced Heart Failure and Transplant Cardiology [phone]    Mile Bolivar, VERONICA CNP Referring Nurse Practitioner [phone]    Shaun Aguiar MD Referring Cardiovascular Disease [phone]

## 2024-08-21 ENCOUNTER — CARE COORDINATION (OUTPATIENT)
Dept: CARDIOLOGY | Facility: CLINIC | Age: 59
End: 2024-08-21
Payer: COMMERCIAL

## 2024-08-28 ENCOUNTER — ANTICOAGULATION THERAPY VISIT (OUTPATIENT)
Dept: ANTICOAGULATION | Facility: CLINIC | Age: 59
End: 2024-08-28
Payer: COMMERCIAL

## 2024-08-28 DIAGNOSIS — Z95.811 LVAD (LEFT VENTRICULAR ASSIST DEVICE) PRESENT (H): ICD-10-CM

## 2024-08-28 DIAGNOSIS — I50.23 ACUTE ON CHRONIC SYSTOLIC CONGESTIVE HEART FAILURE (H): Primary | ICD-10-CM

## 2024-08-28 DIAGNOSIS — I50.22 CHRONIC SYSTOLIC CONGESTIVE HEART FAILURE (H): ICD-10-CM

## 2024-08-28 DIAGNOSIS — Z95.811 LEFT VENTRICULAR ASSIST DEVICE PRESENT (H): ICD-10-CM

## 2024-08-28 DIAGNOSIS — Z79.899 LONG TERM USE OF DRUG: ICD-10-CM

## 2024-08-28 DIAGNOSIS — Z79.01 ANTICOAGULATED ON COUMADIN: ICD-10-CM

## 2024-08-28 LAB — INR HOME MONITORING: 2.7 (ref 2–3)

## 2024-08-28 NOTE — PROGRESS NOTES
ANTICOAGULATION MANAGEMENT     Akshat Fragoso 59 year old male is on warfarin with therapeutic INR result. (Goal INR 2.0-3.0)    Recent labs: (last 7 days)     08/28/24  0000   INR 2.7       ASSESSMENT     Source(s): Chart Review  Previous INR was Therapeutic last 2(+) visits  Medication, diet, health changes since last INR chart reviewed; none identified         PLAN     Recommended plan for no diet, medication or health factor changes affecting INR     Dosing Instructions: Continue your current warfarin dose with next INR in 2 weeks       Summary  As of 8/28/2024      Full warfarin instructions:  5 mg every Mon, Wed, Fri; 2.5 mg all other days   Next INR check:  9/11/2024               Detailed voice message left for Akshat with dosing instructions and follow up date.   Sent [employer] message with dosing and follow up instructions    Patient to recheck with home meter    Education provided: Please call back if any changes to your diet, medications or how you've been taking warfarin    Plan made per LVAD protocol    Golden Lenz RN  Anticoagulation Clinic  8/28/2024    _______________________________________________________________________     Anticoagulation Episode Summary       Current INR goal:  2.0-3.0   TTR:  88.6% (1 y)   Target end date:  Indefinite   Send INR reminders to:  ANTICOAG LVAD    Indications    Acute on chronic systolic congestive heart failure (H) [I50.23]  LVAD (left ventricular assist device) present (H) [Z95.811]  Chronic systolic congestive heart failure (H) [I50.22]  Long term use of drug [Z79.899]  Left ventricular assist device present (H) [Z95.811]  Anticoagulated on Coumadin [Z79.01]             Comments:  Follow VAD Anticoag protocol:Yes: HeartMate 3   Bridging: Enoxaparin   Date VAD placed: 3/23/23             Anticoagulation Care Providers       Provider Role Specialty Phone number    Kenzie Moreau MD Referring Advanced Heart Failure and Transplant Cardiology [phone]    Osmel  Mile MADDOX, VERONICA CNP Referring Nurse Practitioner [phone]    Shaun Aguiar MD Referring Cardiovascular Disease [phone]

## 2024-08-30 RX ORDER — AMIODARONE HYDROCHLORIDE 200 MG/1
100 TABLET ORAL DAILY
Qty: 45 TABLET | Refills: 0 | Status: SHIPPED | OUTPATIENT
Start: 2024-08-30 | End: 2024-09-03

## 2024-08-30 RX ORDER — AMIODARONE HYDROCHLORIDE 200 MG/1
TABLET ORAL
Qty: 45 TABLET | Refills: 1 | OUTPATIENT
Start: 2024-08-30

## 2024-09-01 NOTE — PROGRESS NOTES
September 3, 2024    Akshat Fragoso is a 59 year old male with history of HFrEF 2/2 NICM (diagnosed in 1/2017) s/p ICD placement in 6/2017, VT/VF arrest on 3/15/2023 requiring CPR for 6 mins with cardiogenic shock s/p HM3 LVAD implantation as DT 3/23/2023 c/b postop AF (on amiodarone and digoxin) and HAP, moderate TR s/p TV annuloplasty with 32 mm MC3 partial ring 3/23/2023, PFO closure 3/23/2023, chronic tobacco/marijuana use, COPD, HTN, CKD stage III, who presents today for LVAD follow-up.      Last seen in LVAD clinic 07/2024. Fatigue was better and he continued to have some dyspnea on exertion. LVAD functioning well without concerns. Medication adjustments included: recommended transition from lisinopril to entresto 12/13mg BID; however, patient has not started the Entresto yet but plans to this week.    Overall, he is doing well.  No acute concerns today.  No fevers or chills.  No drainage from the driveline.  No surrounding warmth, tenderness, or erythema at the driveline site.  No lower extremity edema.  No falls.  No bleeding concerns.  No ICD shocks.    Current cardiac medications  - Entresto 12/13 mg BID (plans to start this week with washout from lisinopril)  - eplerenone 12.5mg daily  - Empagliflozin 10 mg daily  - Bumex 1 mg every other day  - Digoxin 250 mcg daily  - Amiodarone 100 mg daily  - Warfarin (INR goal 2-3)      PAST MEDICAL HISTORY:  Past Medical History:   Diagnosis Date    Acute right lumbar radiculopathy 11/02/2015    Acute systolic heart failure (H) 01/10/2017    Cardiomyopathy (H) 01/10/2017    CKD (chronic kidney disease) stage 3, GFR 30-59 ml/min (H) 01/30/2020    JOHNSON (dyspnea on exertion)     ED (erectile dysfunction) 10/02/2019    Erectile dysfunction     ICD (implantable cardioverter-defibrillator) in place- [employer], single chamber- NOT dependent 10/09/2017    Personal history of smoking 01/04/2017    Pulmonary nodules 01/17/2017    CT 11/2018:  Bilateral pulmonary nodules  measuring up to 4 mm. No new or enlarging pulmonary nodules.     FAMILY HISTORY:  Family History   Problem Relation Age of Onset    Parkinsonism Mother     Atrial fibrillation Father     Heart Failure Father     Prostate Cancer Father     Skin Cancer Father     Anorexia nervosa Daughter     Other - See Comments Granddaughter         premature birth     SOCIAL HISTORY:  Social History     Socioeconomic History    Marital status:      Spouse name: Cheryl    Number of children: 2   Occupational History    Occupation: Construction      Employer: SELF   Tobacco Use    Smoking status: Former     Packs/day: 0.25     Years: 15.00     Pack years: 3.75     Types: Cigarettes    Smokeless tobacco: Former     Quit date: 3/15/2023   Vaping Use    Vaping status: Never Used   Substance and Sexual Activity    Alcohol use: No     Alcohol/week: 0.0 standard drinks of alcohol    Drug use: No    Sexual activity: Yes     Partners: Female     Birth control/protection: Condom   Other Topics Concern    Parent/sibling w/ CABG, MI or angioplasty before 65F 55M? Yes     Comment: both parents had stints placed      CURRENT MEDICATIONS:  Current Outpatient Medications   Medication Sig Dispense Refill    acetaminophen (TYLENOL) 325 MG tablet Take 2 tablets (650 mg) by mouth every 4 hours as needed for other (For optimal non-opioid multimodal pain management to improve pain control.) 100 tablet 0    amiodarone (PACERONE) 200 MG tablet Take 0.5 tablets (100 mg) by mouth daily. 45 tablet 0    amoxicillin (AMOXIL) 500 MG tablet Take 4 tablets (2,000 mg) by mouth as needed (Take one hour before dental procedure.) 4 tablet 2    bumetanide (BUMEX) 1 MG tablet Take 1 tablet (1 mg) by mouth every other day 45 tablet 3    digoxin (LANOXIN) 250 MCG tablet Take 1 tablet (250 mcg) by mouth daily 30 tablet 11    empagliflozin (JARDIANCE) 10 MG TABS tablet Take 1 tablet (10 mg) by mouth daily 90 tablet 3    eplerenone (INSPRA) 25 MG tablet Take 12.5mg  (1/2 tab) daily 45 tablet 3    JANTOVEN ANTICOAGULANT 5 MG tablet       pantoprazole (PROTONIX) 20 MG EC tablet Take 1 tablet (20 mg) by mouth every morning (before breakfast) 90 tablet 3    sacubitril-valsartan (ENTRESTO) 24-26 MG per tablet Take 0.5 tablets by mouth 2 times daily Take 12-13mg (half tab) twice per day 45 tablet 0    sildenafil (VIAGRA) 50 MG tablet Take 1 tablet (50 mg) by mouth daily as needed (PRN for sexual activity) 30 tablet 0    NARCAN 4 MG/0.1ML nasal spray Spray 4 mg into one nostril alternating nostrils once as needed       No current facility-administered medications for this visit.     ROS:   See HPI     EXAM:  BP (!) 72/0 (BP Location: Right arm, Patient Position: Chair, Cuff Size: Adult Regular)   Pulse 81   Wt 81.6 kg (180 lb)   SpO2 95%   BMI 28.19 kg/m    GENERAL: Appears comfortable, in no acute distress.   HEENT: Eye symmetrical  CV: + LVAD hum. no adventitious sounds. JVP <6.   RESPIRATORY: Respirations regular, even, and unlabored. Lungs CTA throughout.   GI: Soft  and non distended.   EXTREMITIES: extremities warm and well perfused; no lower extremity edema. All extremities are warm and well perfused  NEUROLOGIC: Alert and interacting appropriately. No focal deficits.   SKIN: No jaundice.  Driveline site c/d/i.       Labs:  Lab Results   Component Value Date    WBC 13.5 (H) 09/03/2024    HGB 14.6 09/03/2024    HCT 46.8 09/03/2024     09/03/2024     07/24/2024    POTASSIUM 4.4 07/24/2024    CHLORIDE 96 (L) 07/24/2024    CO2 30 (H) 07/24/2024    BUN 18.6 07/24/2024    CR 1.36 (H) 07/24/2024     (H) 07/24/2024    DD 3.60 (H) 04/18/2023    NTBNPI 31,087 (H) 03/20/2023    NTBNP 4,114 (H) 07/24/2024    TROPI 0.033 01/09/2017    AST 35 07/24/2024    ALT 30 07/24/2024    ALKPHOS 90 07/24/2024    BILITOTAL 0.7 07/24/2024    INR 2.07 (H) 09/03/2024     Diagnostics:  7/22/24 ICD check  Device: [employer] D150 DYNAGEN  Normal Device Function  Mode: VVI 40  bpm  : 0%  Presenting EGM: VS 58 bpm  Lead Trends Appear Stable.  Estimated battery longevity to RRT = 10 years.  Atrial Arrhythmia: N/A  Anticoagulant: Warfarin  Ventricular Arrhythmia: None  Pt Notified of Transmission Results: MyChart     4/10/24 ICD check  Device: [employer] D150 DYNAGEN  Normal device function  Mode: VVI 40 bpm  : <1%  Intrinsic rhythm: VS @ 83 bpm  Lead Trends Appear Stable: Yes  Estimated battery longevity to RRT = 10.5 years  Ventricular Arrhythmia: 0  Setting Changes: RV amplitude decreased to 2.4 V.  Patient has an appointment to see Dr. Montgomery today.   Plan: Device follow-up every 3 months.  DEL Gao RN    4/1/24 RHC  RA: --/--/15 mm Hg  RV: 60/15 mm Hg  PA: 60/30 (43) mm Hg  PCWP: --/--/24 mm Hg  CO/CI: 3.6/1.8 (TD); 3.3/1.7 (Carlos)   PVR: 5.2 (TD) LING   Right sided filling pressures are moderately elevated. Left sided filling pressures are moderately elevated. Moderately elevated pulmonary artery hypertension. Reduced cardiac output level. Hemodynamic data has been modified in Epic per physician review.        December 27, 2023 echo  Borderline-dilated LV with severely reduced global LV function, LVEF<20%.  LVIDd=5/8 cm.  RV function appears moderately reduced on limited views.  The aortic valve appears to remains closed on limited views. There is no AI.  LVAD inflow cannula is visualized in the LV apex. LVAD outflow graft is  visualized in the aorta. Normal Doppler interrogation of the LVAD inflow  cannula and outflow graft.  This study was compared with the study from 5/3/23: No significant changes  noted.  ___________    8/7/23 RHC  RA 8  RV 39/8  PA 39/15/24  Wedge 7  Cardiac output 4.3, cardiac index 2.3 by thermo  Cardiac output 3,   cardiac index 1.59 by Carlos  PVR: 2 Right sided filling pressures are normal. Left sided filling pressures are normal. Mild elevated pulmonary hypertension. Normal cardiac output level.     Pressures Phase: Baseline     Time Systolic (mmHg)  Diastolic (mmHg) Mean (mmHg) A Wave (mmHg) V Wave (mmHg) EDP (mmHg) Max dp/dt (mmHg/sec) HR (bpm) Content (mL/dL) SAT (%)   RA Pressures 12:09 PM   8    9    13      75        RV Pressures 12:15 PM 39        9     74        PA Pressures 12:16 PM 39    15    24        74        PCW Pressures 12:15 PM   7        75        Art Pressures 11:59 AM 99    75    83        77          Blood Flow Results Phase: Baseline     Time Results Indexed Values (L/min/m2)   QP 11:55 AM 3.07 L/min    1.59      QS 11:55 AM 3.07 L/min    1.59        Blood Oximetry Phase: Baseline       Time Hb SAT(%) PO2 Content (mL/dL) PA Sat (%)   PA 11:55 AM  59 %      59      Art 11:55 AM  100 %     19.18              TTE 3/28/2023  Technically difficult study.Extremely poor acoustic windows.  Limited information was obtained during study.  LVAD cannula was seen in the expected anatomic position in the LV apex.  HM3 at 5200RPM.  Septum normal.  LVIDd 56mm.  Aortic valve not well visualized. No AI.  Normal outflow velocity.  Global right ventricular function is moderately to severely reduced.  Small pericardial effusion with organizing material in pericardial cavity.     TTE 5/3/23:  Please refer to the EPIC report for measurements performed at different LVAD speed settings.  HM3 at 5200RPM at baseline.  LVIDd 43mm.  Septum normal.  Aortic valve remain closed at baseline.     Assessment and Plan:   Akshat Fragoso is a 59 year old male with history of HFrEF 2/2 NICM (diagnosed in 1/2017) s/p ICD placement in 6/2017, VT/VF arrest on 3/15/2023 requiring CPR for 6 mins with cardiogenic shock s/p HM3 LVAD implantation as DT 3/23/2023 c/b postop AF (on amiodarone and digoxin) and HAP, moderate TR s/p TV annuloplasty with 32 mm MC3 partial ring 3/23/2023, PFO closure 3/23/2023, chronic tobacco/marijuana use, COPD, HTN, CKD stage III, who presents today for follow-up.     Patient presents for routine follow-up.  No acute concerns today.  Tolerating medications  without any side effects.  No concerns for LVAD complications as he has no infectious symptoms or bleeding concerns.  No alarms.  We have interrogated the LVAD today.     # Chronic systolic heart failure secondary to NICM with cardiogenic shock s/p HM III on 3/23/23 LVAD. ACC/AHA Stage D, NYHA Class III  Moderate TR s/p TV annuloplasty with 32 mm MC3 partial ring 3/23/2023.    Fluid status: euvolemic - will continue Bumex.   ACEi/ARB start entresto 12/13mg BID with washout from lisinopril.  Instructed patient to monitor his blood pressure with initiation of the Entresto.  BB deferred given recent LVAD implant  Aldosterone antagonist eplerenone 12.5 mg daily  SGLT-2: continue empagliflozin 10mg daily  SCD prophylaxis ICD, which was interrogated today.  NSAID use: contraindicated  Sleep Apnea Evaluation: not discussed today  BP: MAP goal 65-85, at goal  LDH trends:  Anticoagulation: warfarin INR goal 2-3  Antiplatelet: Off ASA  Advanced therapies consideration: Patient is interested in moving forward with transplant listing, we will contact the coordinators to facilitate necessary workup for committee discussion.    # Afib with RVR, resolved.   - Continue Amiodarone and Digoxin.  - Warfarin with INR goal 2-3    # Moderate TR s/p TV annuloplasty with 32 mm MC3 partial ring 3/23/2023  PFO closure 3/23/2023  - Trend on ECHOs       # HTN.   - MAP within goal, continue the above regimen    Patient seen and discussed with attending physician, Dr. Shaun Aguiar. Please see his attestation for final plan.    Dakota Martinez MD  Cardiology Fellow     I have seen and evaluated the patient and agree with the assessment and plan as above.  I appreciate the opportunity to participate in the care of Paras Malik . Please do not hesitate to contact me with any further questions.  I have spent a total of 40 minutes today reviewing labs, imaging studies, discussing with colleagues, face-to-face time with patient and documentation in  the medical record.  The longitudinal plan of care for the diagnosis(es)/condition(s) as documented were addressed during this visit. Due to the added complexity in care, I will continue to support Nick in the subsequent management and with ongoing continuity of care.     Sincerely,   Shaun Aguiar MD  [facility] Division of Cardiology

## 2024-09-03 ENCOUNTER — LAB (OUTPATIENT)
Dept: LAB | Facility: CLINIC | Age: 59
End: 2024-09-03
Attending: INTERNAL MEDICINE
Payer: COMMERCIAL

## 2024-09-03 ENCOUNTER — OFFICE VISIT (OUTPATIENT)
Dept: CARDIOLOGY | Facility: CLINIC | Age: 59
DRG: 277 | End: 2024-09-03
Attending: INTERNAL MEDICINE
Payer: COMMERCIAL

## 2024-09-03 ENCOUNTER — ANCILLARY PROCEDURE (OUTPATIENT)
Dept: GENERAL RADIOLOGY | Facility: CLINIC | Age: 59
End: 2024-09-03
Payer: COMMERCIAL

## 2024-09-03 ENCOUNTER — ANCILLARY PROCEDURE (OUTPATIENT)
Dept: CARDIOLOGY | Facility: CLINIC | Age: 59
DRG: 277 | End: 2024-09-03
Attending: EMERGENCY MEDICINE
Payer: COMMERCIAL

## 2024-09-03 ENCOUNTER — MYC MEDICAL ADVICE (OUTPATIENT)
Dept: ANTICOAGULATION | Facility: CLINIC | Age: 59
End: 2024-09-03

## 2024-09-03 ENCOUNTER — ANTICOAGULATION THERAPY VISIT (OUTPATIENT)
Dept: ANTICOAGULATION | Facility: CLINIC | Age: 59
End: 2024-09-03

## 2024-09-03 ENCOUNTER — ANCILLARY PROCEDURE (OUTPATIENT)
Dept: CARDIOLOGY | Facility: CLINIC | Age: 59
End: 2024-09-03
Attending: INTERNAL MEDICINE
Payer: COMMERCIAL

## 2024-09-03 ENCOUNTER — CARE COORDINATION (OUTPATIENT)
Dept: CARDIOLOGY | Facility: CLINIC | Age: 59
End: 2024-09-03

## 2024-09-03 ENCOUNTER — HOSPITAL ENCOUNTER (INPATIENT)
Facility: CLINIC | Age: 59
LOS: 3 days | Discharge: HOME | DRG: 277 | End: 2024-09-06
Attending: EMERGENCY MEDICINE | Admitting: INTERNAL MEDICINE
Payer: COMMERCIAL

## 2024-09-03 VITALS
BODY MASS INDEX: 28.19 KG/M2 | WEIGHT: 180 LBS | HEART RATE: 81 BPM | SYSTOLIC BLOOD PRESSURE: 72 MMHG | OXYGEN SATURATION: 95 %

## 2024-09-03 DIAGNOSIS — R07.9 CHEST PAIN, UNSPECIFIED TYPE: ICD-10-CM

## 2024-09-03 DIAGNOSIS — Z95.810 ICD (IMPLANTABLE CARDIOVERTER-DEFIBRILLATOR) IN PLACE: ICD-10-CM

## 2024-09-03 DIAGNOSIS — I50.22 CHRONIC SYSTOLIC CONGESTIVE HEART FAILURE (H): ICD-10-CM

## 2024-09-03 DIAGNOSIS — I48.0 PAROXYSMAL ATRIAL FIBRILLATION (H): ICD-10-CM

## 2024-09-03 DIAGNOSIS — Z95.811 LVAD (LEFT VENTRICULAR ASSIST DEVICE) PRESENT (H): ICD-10-CM

## 2024-09-03 DIAGNOSIS — I50.23 ACUTE ON CHRONIC SYSTOLIC CONGESTIVE HEART FAILURE (H): ICD-10-CM

## 2024-09-03 DIAGNOSIS — Z79.01 LONG TERM (CURRENT) USE OF ANTICOAGULANTS: ICD-10-CM

## 2024-09-03 DIAGNOSIS — I50.22 CHRONIC SYSTOLIC HEART FAILURE (H): ICD-10-CM

## 2024-09-03 DIAGNOSIS — I50.23 ACUTE ON CHRONIC SYSTOLIC CONGESTIVE HEART FAILURE (H): Primary | ICD-10-CM

## 2024-09-03 DIAGNOSIS — I42.9 CARDIOMYOPATHY (H): ICD-10-CM

## 2024-09-03 DIAGNOSIS — Z95.811 LVAD (LEFT VENTRICULAR ASSIST DEVICE) PRESENT (H): Primary | ICD-10-CM

## 2024-09-03 DIAGNOSIS — I50.22 CHRONIC SYSTOLIC HEART FAILURE (H): Primary | ICD-10-CM

## 2024-09-03 DIAGNOSIS — Z95.811 LEFT VENTRICULAR ASSIST DEVICE PRESENT (H): ICD-10-CM

## 2024-09-03 DIAGNOSIS — Z79.01 ANTICOAGULATED ON COUMADIN: ICD-10-CM

## 2024-09-03 DIAGNOSIS — I47.20 VENTRICULAR TACHYCARDIA (H): ICD-10-CM

## 2024-09-03 DIAGNOSIS — T82.110A AICD LEAD MALFUNCTION: ICD-10-CM

## 2024-09-03 DIAGNOSIS — T82.198A INAPPROPRIATE DISCHARGE OF IMPLANTABLE CARDIOVERTER-DEFIBRILLATOR (ICD), INITIAL ENCOUNTER: ICD-10-CM

## 2024-09-03 DIAGNOSIS — T82.110A AICD LEAD MALFUNCTION: Primary | ICD-10-CM

## 2024-09-03 DIAGNOSIS — Z79.899 LONG TERM USE OF DRUG: ICD-10-CM

## 2024-09-03 DIAGNOSIS — Z95.810 ICD (IMPLANTABLE CARDIOVERTER-DEFIBRILLATOR) IN PLACE: Primary | ICD-10-CM

## 2024-09-03 DIAGNOSIS — I50.22 CHRONIC SYSTOLIC CONGESTIVE HEART FAILURE (H): Primary | ICD-10-CM

## 2024-09-03 DIAGNOSIS — R00.0 TACHYCARDIA, UNSPECIFIED: ICD-10-CM

## 2024-09-03 LAB
ALBUMIN UR-MCNC: NEGATIVE MG/DL
ANION GAP SERPL CALCULATED.3IONS-SCNC: 14 MMOL/L (ref 7–15)
APPEARANCE UR: CLEAR
ATRIAL RATE - MUSE: NORMAL BPM
BASE EXCESS BLDV CALC-SCNC: 0 MMOL/L (ref -3–3)
BASOPHILS # BLD AUTO: 0.1 10E3/UL (ref 0–0.2)
BASOPHILS NFR BLD AUTO: 1 %
BILIRUB UR QL STRIP: NEGATIVE
BUN SERPL-MCNC: 20.7 MG/DL (ref 6–20)
CA-I BLD-MCNC: 4.5 MG/DL (ref 4.4–5.2)
CALCIUM SERPL-MCNC: 8.9 MG/DL (ref 8.8–10.4)
CHLORIDE SERPL-SCNC: 97 MMOL/L (ref 98–107)
COLOR UR AUTO: ABNORMAL
CPB POCT: NO
CREAT SERPL-MCNC: 1.3 MG/DL (ref 0.67–1.17)
DIASTOLIC BLOOD PRESSURE - MUSE: NORMAL MMHG
EGFRCR SERPLBLD CKD-EPI 2021: 64 ML/MIN/1.73M2
EOSINOPHIL # BLD AUTO: 0.2 10E3/UL (ref 0–0.7)
EOSINOPHIL NFR BLD AUTO: 1 %
ERYTHROCYTE [DISTWIDTH] IN BLOOD BY AUTOMATED COUNT: 18.4 % (ref 10–15)
ERYTHROCYTE [DISTWIDTH] IN BLOOD BY AUTOMATED COUNT: 18.5 % (ref 10–15)
GLUCOSE BLD-MCNC: 131 MG/DL (ref 70–99)
GLUCOSE SERPL-MCNC: 130 MG/DL (ref 70–99)
GLUCOSE UR STRIP-MCNC: >=1000 MG/DL
HCO3 BLDV-SCNC: 26 MMOL/L (ref 21–28)
HCO3 SERPL-SCNC: 22 MMOL/L (ref 22–29)
HCT VFR BLD AUTO: 46.8 % (ref 40–53)
HCT VFR BLD AUTO: 48 % (ref 40–53)
HCT VFR BLD CALC: 46 % (ref 40–53)
HGB BLD-MCNC: 14.6 G/DL (ref 13.3–17.7)
HGB BLD-MCNC: 14.8 G/DL (ref 13.3–17.7)
HGB BLD-MCNC: 15.6 G/DL (ref 13.3–17.7)
HGB UR QL STRIP: NEGATIVE
HOLD SPECIMEN: NORMAL
IMM GRANULOCYTES # BLD: 0.2 10E3/UL
IMM GRANULOCYTES NFR BLD: 1 %
INR PPP: 2.07 (ref 0.85–1.15)
INR PPP: 2.11 (ref 0.85–1.15)
INTERPRETATION ECG - MUSE: NORMAL
KETONES UR STRIP-MCNC: 20 MG/DL
LDH SERPL L TO P-CCNC: 273 U/L (ref 0–250)
LEUKOCYTE ESTERASE UR QL STRIP: NEGATIVE
LYMPHOCYTES # BLD AUTO: 2.9 10E3/UL (ref 0.8–5.3)
LYMPHOCYTES NFR BLD AUTO: 19 %
MAGNESIUM SERPL-MCNC: 1.9 MG/DL (ref 1.7–2.3)
MCH RBC QN AUTO: 27.3 PG (ref 26.5–33)
MCH RBC QN AUTO: 27.4 PG (ref 26.5–33)
MCHC RBC AUTO-ENTMCNC: 30.8 G/DL (ref 31.5–36.5)
MCHC RBC AUTO-ENTMCNC: 31.2 G/DL (ref 31.5–36.5)
MCV RBC AUTO: 88 FL (ref 78–100)
MCV RBC AUTO: 89 FL (ref 78–100)
MONOCYTES # BLD AUTO: 1.3 10E3/UL (ref 0–1.3)
MONOCYTES NFR BLD AUTO: 8 %
NEUTROPHILS # BLD AUTO: 10.5 10E3/UL (ref 1.6–8.3)
NEUTROPHILS NFR BLD AUTO: 70 %
NITRATE UR QL: NEGATIVE
NRBC # BLD AUTO: 0 10E3/UL
NRBC BLD AUTO-RTO: 0 /100
NT-PROBNP SERPL-MCNC: 2071 PG/ML (ref 0–900)
P AXIS - MUSE: NORMAL DEGREES
PCO2 BLDV: 47 MM HG (ref 40–50)
PH BLDV: 7.35 [PH] (ref 7.32–7.43)
PH UR STRIP: 6 [PH] (ref 5–7)
PHOSPHATE SERPL-MCNC: 3.1 MG/DL (ref 2.5–4.5)
PLATELET # BLD AUTO: 219 10E3/UL (ref 150–450)
PLATELET # BLD AUTO: 240 10E3/UL (ref 150–450)
PO2 BLDV: 46 MM HG (ref 25–47)
POTASSIUM BLD-SCNC: 4.2 MMOL/L (ref 3.4–5.3)
POTASSIUM SERPL-SCNC: 4.3 MMOL/L (ref 3.4–5.3)
PR INTERVAL - MUSE: NORMAL MS
QRS DURATION - MUSE: 166 MS
QT - MUSE: 380 MS
QTC - MUSE: 492 MS
R AXIS - MUSE: 216 DEGREES
RBC # BLD AUTO: 5.34 10E6/UL (ref 4.4–5.9)
RBC # BLD AUTO: 5.41 10E6/UL (ref 4.4–5.9)
RBC URINE: 1 /HPF
SAO2 % BLDV: 79 % (ref 70–75)
SODIUM BLD-SCNC: 135 MMOL/L (ref 135–145)
SODIUM SERPL-SCNC: 133 MMOL/L (ref 135–145)
SP GR UR STRIP: 1.02 (ref 1–1.03)
SYSTOLIC BLOOD PRESSURE - MUSE: NORMAL MMHG
T AXIS - MUSE: 108 DEGREES
TROPONIN T SERPL HS-MCNC: 32 NG/L
TSH SERPL DL<=0.005 MIU/L-ACNC: 2.6 UIU/ML (ref 0.3–4.2)
UROBILINOGEN UR STRIP-MCNC: NORMAL MG/DL
VENTRICULAR RATE- MUSE: 101 BPM
WBC # BLD AUTO: 13.5 10E3/UL (ref 4–11)
WBC # BLD AUTO: 15 10E3/UL (ref 4–11)
WBC URINE: <1 /HPF

## 2024-09-03 PROCEDURE — 99223 1ST HOSP IP/OBS HIGH 75: CPT | Mod: 57 | Performed by: NURSE PRACTITIONER

## 2024-09-03 PROCEDURE — G0463 HOSPITAL OUTPT CLINIC VISIT: HCPCS | Mod: 25 | Performed by: INTERNAL MEDICINE

## 2024-09-03 PROCEDURE — 82947 ASSAY GLUCOSE BLOOD QUANT: CPT | Performed by: EMERGENCY MEDICINE

## 2024-09-03 PROCEDURE — 99215 OFFICE O/P EST HI 40 MIN: CPT | Mod: 25 | Performed by: INTERNAL MEDICINE

## 2024-09-03 PROCEDURE — 250N000013 HC RX MED GY IP 250 OP 250 PS 637: Performed by: EMERGENCY MEDICINE

## 2024-09-03 PROCEDURE — 85610 PROTHROMBIN TIME: CPT | Performed by: EMERGENCY MEDICINE

## 2024-09-03 PROCEDURE — 84100 ASSAY OF PHOSPHORUS: CPT | Performed by: EMERGENCY MEDICINE

## 2024-09-03 PROCEDURE — 82330 ASSAY OF CALCIUM: CPT

## 2024-09-03 PROCEDURE — 83880 ASSAY OF NATRIURETIC PEPTIDE: CPT | Performed by: EMERGENCY MEDICINE

## 2024-09-03 PROCEDURE — 83615 LACTATE (LD) (LDH) ENZYME: CPT | Performed by: EMERGENCY MEDICINE

## 2024-09-03 PROCEDURE — 99285 EMERGENCY DEPT VISIT HI MDM: CPT | Performed by: EMERGENCY MEDICINE

## 2024-09-03 PROCEDURE — 93750 INTERROGATION VAD IN PERSON: CPT

## 2024-09-03 PROCEDURE — 36415 COLL VENOUS BLD VENIPUNCTURE: CPT | Performed by: PATHOLOGY

## 2024-09-03 PROCEDURE — 93750 INTERROGATION VAD IN PERSON: CPT | Performed by: INTERNAL MEDICINE

## 2024-09-03 PROCEDURE — 85025 COMPLETE CBC W/AUTO DIFF WBC: CPT | Performed by: PATHOLOGY

## 2024-09-03 PROCEDURE — 99223 1ST HOSP IP/OBS HIGH 75: CPT | Mod: 25 | Performed by: INTERNAL MEDICINE

## 2024-09-03 PROCEDURE — 83735 ASSAY OF MAGNESIUM: CPT | Performed by: EMERGENCY MEDICINE

## 2024-09-03 PROCEDURE — 84484 ASSAY OF TROPONIN QUANT: CPT | Performed by: EMERGENCY MEDICINE

## 2024-09-03 PROCEDURE — 120N000005 HC R&B MS OVERFLOW UMMC

## 2024-09-03 PROCEDURE — 250N000013 HC RX MED GY IP 250 OP 250 PS 637: Performed by: INTERNAL MEDICINE

## 2024-09-03 PROCEDURE — 85610 PROTHROMBIN TIME: CPT | Performed by: PATHOLOGY

## 2024-09-03 PROCEDURE — 85027 COMPLETE CBC AUTOMATED: CPT | Performed by: EMERGENCY MEDICINE

## 2024-09-03 PROCEDURE — 99207 PR NO CHARGE LOS: CPT | Performed by: INTERNAL MEDICINE

## 2024-09-03 PROCEDURE — 250N000013 HC RX MED GY IP 250 OP 250 PS 637

## 2024-09-03 PROCEDURE — 93010 ELECTROCARDIOGRAM REPORT: CPT | Performed by: EMERGENCY MEDICINE

## 2024-09-03 PROCEDURE — 84443 ASSAY THYROID STIM HORMONE: CPT | Performed by: EMERGENCY MEDICINE

## 2024-09-03 PROCEDURE — 81001 URINALYSIS AUTO W/SCOPE: CPT

## 2024-09-03 PROCEDURE — 93282 PRGRMG EVAL IMPLANTABLE DFB: CPT

## 2024-09-03 PROCEDURE — 71046 X-RAY EXAM CHEST 2 VIEWS: CPT | Mod: GC | Performed by: RADIOLOGY

## 2024-09-03 PROCEDURE — 93282 PRGRMG EVAL IMPLANTABLE DFB: CPT | Mod: 26 | Performed by: INTERNAL MEDICINE

## 2024-09-03 PROCEDURE — 93282 PRGRMG EVAL IMPLANTABLE DFB: CPT | Performed by: INTERNAL MEDICINE

## 2024-09-03 PROCEDURE — 93005 ELECTROCARDIOGRAM TRACING: CPT | Performed by: EMERGENCY MEDICINE

## 2024-09-03 PROCEDURE — 36415 COLL VENOUS BLD VENIPUNCTURE: CPT | Performed by: EMERGENCY MEDICINE

## 2024-09-03 PROCEDURE — 85027 COMPLETE CBC AUTOMATED: CPT | Mod: XU | Performed by: PATHOLOGY

## 2024-09-03 RX ORDER — DIGOXIN 125 MCG
250 TABLET ORAL DAILY
Status: DISCONTINUED | OUTPATIENT
Start: 2024-09-04 | End: 2024-09-06 | Stop reason: HOSPADM

## 2024-09-03 RX ORDER — AMIODARONE HYDROCHLORIDE 100 MG/1
100 TABLET ORAL DAILY
Status: DISCONTINUED | OUTPATIENT
Start: 2024-09-04 | End: 2024-09-06 | Stop reason: HOSPADM

## 2024-09-03 RX ORDER — ACETAMINOPHEN 325 MG/1
650 TABLET ORAL EVERY 4 HOURS PRN
Status: DISCONTINUED | OUTPATIENT
Start: 2024-09-03 | End: 2024-09-06 | Stop reason: HOSPADM

## 2024-09-03 RX ORDER — OXYCODONE AND ACETAMINOPHEN 5; 325 MG/1; MG/1
1-2 TABLET ORAL EVERY 6 HOURS PRN
Status: DISCONTINUED | OUTPATIENT
Start: 2024-09-03 | End: 2024-09-03

## 2024-09-03 RX ORDER — EPLERENONE 25 MG/1
12.5 TABLET ORAL DAILY
Status: DISCONTINUED | OUTPATIENT
Start: 2024-09-04 | End: 2024-09-06 | Stop reason: HOSPADM

## 2024-09-03 RX ORDER — AMOXICILLIN 250 MG
2 CAPSULE ORAL 2 TIMES DAILY PRN
Status: DISCONTINUED | OUTPATIENT
Start: 2024-09-03 | End: 2024-09-06 | Stop reason: HOSPADM

## 2024-09-03 RX ORDER — OXYCODONE HYDROCHLORIDE 5 MG/1
5 TABLET ORAL ONCE
Status: COMPLETED | OUTPATIENT
Start: 2024-09-03 | End: 2024-09-03

## 2024-09-03 RX ORDER — BUMETANIDE 1 MG/1
1 TABLET ORAL EVERY OTHER DAY
Status: DISCONTINUED | OUTPATIENT
Start: 2024-09-04 | End: 2024-09-06 | Stop reason: HOSPADM

## 2024-09-03 RX ORDER — OXYCODONE AND ACETAMINOPHEN 5; 325 MG/1; MG/1
1 TABLET ORAL
Status: COMPLETED | OUTPATIENT
Start: 2024-09-03 | End: 2024-09-04

## 2024-09-03 RX ORDER — CALCIUM CARBONATE 500 MG/1
1000 TABLET, CHEWABLE ORAL 4 TIMES DAILY PRN
Status: DISCONTINUED | OUTPATIENT
Start: 2024-09-03 | End: 2024-09-06 | Stop reason: HOSPADM

## 2024-09-03 RX ORDER — AMIODARONE HYDROCHLORIDE 200 MG/1
100 TABLET ORAL DAILY
Qty: 45 TABLET | Refills: 0 | Status: SHIPPED | OUTPATIENT
Start: 2024-09-03

## 2024-09-03 RX ORDER — PANTOPRAZOLE SODIUM 20 MG/1
20 TABLET, DELAYED RELEASE ORAL
Status: DISCONTINUED | OUTPATIENT
Start: 2024-09-04 | End: 2024-09-06 | Stop reason: HOSPADM

## 2024-09-03 RX ORDER — AMOXICILLIN 250 MG
1 CAPSULE ORAL 2 TIMES DAILY PRN
Status: DISCONTINUED | OUTPATIENT
Start: 2024-09-03 | End: 2024-09-06 | Stop reason: HOSPADM

## 2024-09-03 RX ORDER — WARFARIN SODIUM 2.5 MG/1
2.5 TABLET ORAL
Status: COMPLETED | OUTPATIENT
Start: 2024-09-03 | End: 2024-09-03

## 2024-09-03 RX ORDER — LIDOCAINE 40 MG/G
CREAM TOPICAL
Status: DISCONTINUED | OUTPATIENT
Start: 2024-09-03 | End: 2024-09-06 | Stop reason: HOSPADM

## 2024-09-03 RX ADMIN — OXYCODONE HYDROCHLORIDE 5 MG: 5 TABLET ORAL at 15:51

## 2024-09-03 RX ADMIN — WARFARIN SODIUM 2.5 MG: 2.5 TABLET ORAL at 17:39

## 2024-09-03 RX ADMIN — OXYCODONE HYDROCHLORIDE AND ACETAMINOPHEN 1 TABLET: 5; 325 TABLET ORAL at 20:39

## 2024-09-03 RX ADMIN — ACETAMINOPHEN 650 MG: 325 TABLET ORAL at 19:21

## 2024-09-03 ASSESSMENT — ACTIVITIES OF DAILY LIVING (ADL)
ADLS_ACUITY_SCORE: 37
ADLS_ACUITY_SCORE: 37
ADLS_ACUITY_SCORE: 20
ADLS_ACUITY_SCORE: 37

## 2024-09-03 ASSESSMENT — PAIN SCALES - GENERAL: PAINLEVEL: NO PAIN (0)

## 2024-09-03 ASSESSMENT — COLUMBIA-SUICIDE SEVERITY RATING SCALE - C-SSRS
6. HAVE YOU EVER DONE ANYTHING, STARTED TO DO ANYTHING, OR PREPARED TO DO ANYTHING TO END YOUR LIFE?: NO
2. HAVE YOU ACTUALLY HAD ANY THOUGHTS OF KILLING YOURSELF IN THE PAST MONTH?: NO
1. IN THE PAST MONTH, HAVE YOU WISHED YOU WERE DEAD OR WISHED YOU COULD GO TO SLEEP AND NOT WAKE UP?: NO

## 2024-09-03 NOTE — PATIENT INSTRUCTIONS
Ventricular Assist Device Clinic  Take your medications every day, as directed!  Medication changes made today:  NO CHANGES to medications today.  Instructions:  Please page the VAD coordinator on call and pacer RN on call with any symptoms of abnormal rhythm.  Please ensure that Lisinopril has been stopped for three days prior to starting Entresto.  Once Entresto has been started, please monitor doppler MAP's at home for one week and report these to [facility].   Be sure to page the VAD coordinator on call with any abnormal MAP (65-85 is normal range) and/or with any symptoms. Please change positions slowly to avoid lightheadedness that can occur with starting Entresto.   A pre-transplant coordinator will reach out to you regarding next steps for transplant listing.   Increase your dry time and apply skin protectant to irritated skin areas. Change your dressing every three days to promote skin healing. Report any driveline symptoms to the VAD coordinator on call. Monitor your heart failure, Page the VAD Coordinator on call:  If you gain more than 3 lb in a day or 5 in a week  If you feel worsening shortness of breath, palpitations, or swelling.   If you have VAD alarms or change in parameters  If you feel dizzy or lightheaded     Keep your VAD appointments!    Please follow up with VAD RABIA in 3 months and Dr. Aguiar in 6 months.      Please see the clinic schedulers after your appointment to schedule follow-up. To contact the VAD , call [phone] To Page the VAD Coordinator on call, dial [phone] option #4 and ask to speak to the VAD coordinator on call. Try to maintain a heart healthy lifestyle!  Limit salt & sodium to 2000mg/day   Eat a heart healthy diet, low in saturated fats  Stay active! Aim to move at least 150 minutes every week.    Use [employer] allows you to communicate directly with your heart team through secure messaging.  [employer] can be accessed any time on your phone, computer,  "or tablet.  If you need assistance, we'd be happy to help!      Equipment Reminders:   Charge your back-up controller at least every 6 months. To charge, connect it to either batteries or wall power. The screen will read \"Charging\". Keep connected until the screen reads \"charging complete\". Disconnect power once the controller battery is charged. Also do a self-test when the controller is connected to power.  Replace the AA batteries in your mobile power unit every 6 months.  Check your battery charger for when it is due for maintenance. It requires inspection in clinic once per year. There will be a sticker stating the month and year maintenance is due. When you bring your battery charger to clinic, tell one of the schedulers you have LVAD equipment that needs maintenance. They will call [employer]. You can leave your  under the LVAD sign by the  at the far end of clinic. You must drop it off before 2pm.        "

## 2024-09-03 NOTE — NURSING NOTE
Chief Complaint   Patient presents with    Follow Up     RETURN VAD - 5 month follow up. LAB AND DEVICE CHECK PRIOR       Vitals were taken, medications reconciled, and MAP was recorded.    ELYSSA Solomon  10:23 AM

## 2024-09-03 NOTE — PROGRESS NOTES
ANTICOAGULATION MANAGEMENT     Akshat Fragoso 59 year old male is on warfarin with therapeutic INR result. (Goal INR 2.0-3.0)    Recent labs: (last 7 days)     09/03/24  1239   INR 2.11*       ASSESSMENT     Cards OV today: ensure that Lisinopril has been stopped for three days prior to starting Entresto     Currently in ER for chest pain due to inappropriate discharge of ICD 2/2 faulty defibrillator lead.      PLAN     Recommended plan for no diet, medication or health factor changes affecting INR     Dosing Instructions: Continue your current warfarin dose with next INR in 1 week       Summary  As of 9/3/2024      Full warfarin instructions:  5 mg every Mon, Wed, Fri; 2.5 mg all other days   Next INR check:  9/11/2024               Sent ADVIZE message with dosing and follow up instructions  [facility] will receive ADT once discharged to home.     Patient to recheck with home meter    Education provided: Goal range and lab monitoring: goal range and significance of current result and Importance of following up at instructed interval  Contact [phone] with any changes, questions or concerns.     Plan made per [facility] anticoagulation protocol and per LVAD protocol    TREV LYN RN  Anticoagulation Clinic  9/3/2024    _______________________________________________________________________     Anticoagulation Episode Summary       Current INR goal:  2.0-3.0   TTR:  88.9% (1 y)   Target end date:  Indefinite   Send INR reminders to:  ANTICOAG LVAD    Indications    Acute on chronic systolic congestive heart failure (H) [I50.23]  LVAD (left ventricular assist device) present (H) [Z95.811]  Chronic systolic congestive heart failure (H) [I50.22]  Long term use of drug [Z79.899]  Left ventricular assist device present (H) [Z95.811]  Anticoagulated on Coumadin [Z79.01]             Comments:  Follow VAD Anticoag protocol:Yes: HeartMate 3   Bridging: Enoxaparin   Date VAD placed: 3/23/23             Anticoagulation Care Providers        Provider Role Specialty Phone number    Kenzie Moreau MD Referring Advanced Heart Failure and Transplant Cardiology [phone]    Mile Bolivar, APRN CNP Referring Nurse Practitioner [phone]    Shaun Aguiar MD Referring Cardiovascular Disease [phone]

## 2024-09-03 NOTE — LETTER
9/3/2024      RE: Akshat Fragoso  [address]  [facility] [address]       Dear Colleague,    Thank you for the opportunity to participate in the care of your patient, Akshat Fragoso, at the [facility] [place] [facility]. Please see a copy of my visit note below.    September 3, 2024    Akshat Fragoso is a 59 year old male with history of HFrEF 2/2 NICM (diagnosed in 1/2017) s/p ICD placement in 6/2017, VT/VF arrest on 3/15/2023 requiring CPR for 6 mins with cardiogenic shock s/p HM3 LVAD implantation as DT 3/23/2023 c/b postop AF (on amiodarone and digoxin) and HAP, moderate TR s/p TV annuloplasty with 32 mm MC3 partial ring 3/23/2023, PFO closure 3/23/2023, chronic tobacco/marijuana use, COPD, HTN, CKD stage III, who presents today for LVAD follow-up.      Last seen in LVAD clinic 07/2024. Fatigue was better and he continued to have some dyspnea on exertion. LVAD functioning well without concerns. Medication adjustments included: recommended transition from lisinopril to entresto 12/13mg BID; however, patient has not started the Entresto yet but plans to this week.    Overall, he is doing well.  No acute concerns today.  No fevers or chills.  No drainage from the driveline.  No surrounding warmth, tenderness, or erythema at the driveline site.  No lower extremity edema.  No falls.  No bleeding concerns.  No ICD shocks.    Current cardiac medications  - Entresto 12/13 mg BID (plans to start this week with washout from lisinopril)  - eplerenone 12.5mg daily  - Empagliflozin 10 mg daily  - Bumex 1 mg every other day  - Digoxin 250 mcg daily  - Amiodarone 100 mg daily  - Warfarin (INR goal 2-3)      PAST MEDICAL HISTORY:  Past Medical History:   Diagnosis Date     Acute right lumbar radiculopathy 11/02/2015     Acute systolic heart failure (H) 01/10/2017     Cardiomyopathy (H) 01/10/2017     CKD (chronic kidney disease) stage 3, GFR 30-59 ml/min  (H) 01/30/2020     JOHNSON (dyspnea on exertion)      ED (erectile dysfunction) 10/02/2019     Erectile dysfunction      ICD (implantable cardioverter-defibrillator) in place- [place]i, single chamber- NOT dependent 10/09/2017     Personal history of smoking 01/04/2017     Pulmonary nodules 01/17/2017    CT 11/2018:  Bilateral pulmonary nodules measuring up to 4 mm. No new or enlarging pulmonary nodules.     FAMILY HISTORY:  Family History   Problem Relation Age of Onset     Parkinsonism Mother      Atrial fibrillation Father      Heart Failure Father      Prostate Cancer Father      Skin Cancer Father      Anorexia nervosa Daughter      Other - See Comments Granddaughter         premature birth     SOCIAL HISTORY:  Social History     Socioeconomic History     Marital status:      Spouse name: Cheryl     Number of children: 2   Occupational History     Occupation: Construction      Employer: S[employer]   Tobacco Use     Smoking status: Former     Packs/day: 0.25     Years: 15.00     Pack years: 3.75     Types: Cigarettes     Smokeless tobacco: Former     Quit date: 3/15/2023   Vaping Use     Vaping status: Never Used   Substance and Sexual Activity     Alcohol use: No     Alcohol/week: 0.0 standard drinks of alcohol     Drug use: No     Sexual activity: Yes     Partners: Female     Birth control/protection: Condom   Other Topics Concern     Parent/sibling w/ CABG, MI or angioplasty before 65F 55M? Yes     Comment: both parents had stints placed      CURRENT MEDICATIONS:  Current Outpatient Medications   Medication Sig Dispense Refill     acetaminophen (TYLENOL) 325 MG tablet Take 2 tablets (650 mg) by mouth every 4 hours as needed for other (For optimal non-opioid multimodal pain management to improve pain control.) 100 tablet 0     amiodarone (PACERONE) 200 MG tablet Take 0.5 tablets (100 mg) by mouth daily. 45 tablet 0     amoxicillin (AMOXIL) 500 MG tablet Take 4 tablets (2,000 mg) by mouth as needed  (Take one hour before dental procedure.) 4 tablet 2     bumetanide (BUMEX) 1 MG tablet Take 1 tablet (1 mg) by mouth every other day 45 tablet 3     digoxin (LANOXIN) 250 MCG tablet Take 1 tablet (250 mcg) by mouth daily 30 tablet 11     empagliflozin (JARDIANCE) 10 MG TABS tablet Take 1 tablet (10 mg) by mouth daily 90 tablet 3     eplerenone (INSPRA) 25 MG tablet Take 12.5mg (1/2 tab) daily 45 tablet 3     JANTOVEN ANTICOAGULANT 5 MG tablet        pantoprazole (PROTONIX) 20 MG EC tablet Take 1 tablet (20 mg) by mouth every morning (before breakfast) 90 tablet 3     sacubitril-valsartan (ENTRESTO) 24-26 MG per tablet Take 0.5 tablets by mouth 2 times daily Take 12-13mg (half tab) twice per day 45 tablet 0     sildenafil (VIAGRA) 50 MG tablet Take 1 tablet (50 mg) by mouth daily as needed (PRN for sexual activity) 30 tablet 0     NARCAN 4 MG/0.1ML nasal spray Spray 4 mg into one nostril alternating nostrils once as needed       No current facility-administered medications for this visit.     ROS:   See HPI     EXAM:  BP (!) 72/0 (BP Location: Right arm, Patient Position: Chair, Cuff Size: Adult Regular)   Pulse 81   Wt 81.6 kg (180 lb)   SpO2 95%   BMI 28.19 kg/m    GENERAL: Appears comfortable, in no acute distress.   HEENT: Eye symmetrical  CV: + LVAD hum. no adventitious sounds. JVP <6.   RESPIRATORY: Respirations regular, even, and unlabored. Lungs CTA throughout.   GI: Soft  and non distended.   EXTREMITIES: extremities warm and well perfused; no lower extremity edema. All extremities are warm and well perfused  NEUROLOGIC: Alert and interacting appropriately. No focal deficits.   SKIN: No jaundice.  Driveline site c/d/i.       Labs:  Lab Results   Component Value Date    WBC 13.5 (H) 09/03/2024    HGB 14.6 09/03/2024    HCT 46.8 09/03/2024     09/03/2024     07/24/2024    POTASSIUM 4.4 07/24/2024    CHLORIDE 96 (L) 07/24/2024    CO2 30 (H) 07/24/2024    BUN 18.6 07/24/2024    CR 1.36 (H)  07/24/2024     (H) 07/24/2024    DD 3.60 (H) 04/18/2023    NTBNPI 31,087 (H) 03/20/2023    NTBNP 4,114 (H) 07/24/2024    TROPI 0.033 01/09/2017    AST 35 07/24/2024    ALT 30 07/24/2024    ALKPHOS 90 07/24/2024    BILITOTAL 0.7 07/24/2024    INR 2.07 (H) 09/03/2024     Diagnostics:  7/22/24 ICD check  Device: [employer] D150 DYNAGEN  Normal Device Function  Mode: VVI 40 bpm  : 0%  Presenting EGM: VS 58 bpm  Lead Trends Appear Stable.  Estimated battery longevity to RRT = 10 years.  Atrial Arrhythmia: N/A  Anticoagulant: Warfarin  Ventricular Arrhythmia: None  Pt Notified of Transmission Results: [place]onhart     4/10/24 ICD check  Device: [employer] D150 DYNAGEN  Normal device function  Mode: VVI 40 bpm  : <1%  Intrinsic rhythm: VS @ 83 bpm  Lead Trends Appear Stable: Yes  Estimated battery longevity to RRT = 10.5 years  Ventricular Arrhythmia: 0  Setting Changes: RV amplitude decreased to 2.4 V.  Patient has an appointment to see Dr. Montgomery today.   Plan: Device follow-up every 3 months.  DEL Gao RN    4/1/24 [facility]  RA: --/--/15 mm Hg  RV: 60/15 mm Hg  PA: 60/30 (43) mm Hg  PCWP: --/--/24 mm Hg  CO/CI: 3.6/1.8 (TD); 3.3/1.7 (Carlos)   PVR: 5.2 (TD) LING   Right sided filling pressures are moderately elevated. Left sided filling pressures are moderately elevated. Moderately elevated pulmonary artery hypertension. Reduced cardiac output level. Hemodynamic data has been modified in Epic per physician review.        December 27, 2023 echo  Borderline-dilated LV with severely reduced global LV function, LVEF<20%.  LVIDd=5/8 cm.  RV function appears moderately reduced on limited views.  The aortic valve appears to remains closed on limited views. There is no AI.  LVAD inflow cannula is visualized in the LV apex. LVAD outflow graft is  visualized in the aorta. Normal Doppler interrogation of the LVAD inflow  cannula and outflow graft.  This study was compared with the study from 5/3/23: No significant  changes  noted.  ___________    8/7/23 [facility]  RA 8  RV 39/8  PA 39/15/24  Wedge 7  Cardiac output 4.3, cardiac index 2.3 by thermo  Cardiac output 3,   cardiac index 1.59 by Carlos  PVR: 2 Right sided filling pressures are normal. Left sided filling pressures are normal. Mild elevated pulmonary hypertension. Normal cardiac output level.     Pressures Phase: Baseline     Time Systolic (mmHg) Diastolic (mmHg) Mean (mmHg) A Wave (mmHg) V Wave (mmHg) EDP (mmHg) Max dp/dt (mmHg/sec) HR (bpm) Content (mL/dL) SAT (%)   RA Pressures 12:09 PM   8    9    13      75        RV Pressures 12:15 PM 39        9     74        PA Pressures 12:16 PM 39    15    24        74        PCW Pressures 12:15 PM   7        75        Art Pressures 11:59 AM 99    75    83        77          Blood Flow Results Phase: Baseline     Time Results Indexed Values (L/min/m2)   QP 11:55 AM 3.07 L/min    1.59      QS 11:55 AM 3.07 L/min    1.59        Blood Oximetry Phase: Baseline       Time Hb SAT(%) PO2 Content (mL/dL) PA Sat (%)   PA 11:55 AM  59 %      59      Art 11:55 AM  100 %     19.18              TTE 3/28/2023  Technically difficult study.Extremely poor acoustic windows.  Limited information was obtained during study.  LVAD cannula was seen in the expected anatomic position in the LV apex.  HM3 at 5200RPM.  Septum normal.  LVIDd 56mm.  Aortic valve not well visualized. No AI.  Normal outflow velocity.  Global right ventricular function is moderately to severely reduced.  Small pericardial effusion with organizing material in pericardial cavity.     TTE 5/3/23:  Please refer to the EPIC report for measurements performed at different LVAD speed settings.  HM3 at 5200RPM at baseline.  LVIDd 43mm.  Septum normal.  Aortic valve remain closed at baseline.     Assessment and Plan:   Akshat Fragoso is a 59 year old male with history of HFrEF 2/2 NICM (diagnosed in 1/2017) s/p ICD placement in 6/2017, VT/VF arrest on 3/15/2023 requiring CPR for 6 mins with  cardiogenic shock s/p HM3 LVAD implantation as DT 3/23/2023 c/b postop AF (on amiodarone and digoxin) and HAP, moderate TR s/p TV annuloplasty with 32 mm MC3 partial ring 3/23/2023, PFO closure 3/23/2023, chronic tobacco/marijuana use, COPD, HTN, CKD stage III, who presents today for follow-up.     Patient presents for routine follow-up.  No acute concerns today.  Tolerating medications without any side effects.  No concerns for LVAD complications as he has no infectious symptoms or bleeding concerns.  No alarms.  We have interrogated the LVAD today.     # Chronic systolic heart failure secondary to NICM with cardiogenic shock s/p HM III on 3/23/23 LVAD. ACC/AHA Stage D, NYHA Class III  Moderate TR s/p TV annuloplasty with 32 mm MC3 partial ring 3/23/2023.    Fluid status: euvolemic - will continue Bumex.   ACEi/ARB start entresto 12/13mg BID with washout from lisinopril.  Instructed patient to monitor his blood pressure with initiation of the Entresto.  BB deferred given recent LVAD implant  Aldosterone antagonist eplerenone 12.5 mg daily  SGLT-2: continue empagliflozin 10mg daily  SCD prophylaxis ICD, which was interrogated today.  NSAID use: contraindicated  Sleep Apnea Evaluation: not discussed today  BP: MAP goal 65-85, at goal  LDH trends:  Anticoagulation: warfarin INR goal 2-3  Antiplatelet: Off ASA  Advanced therapies consideration: Patient is interested in moving forward with transplant listing, we will contact the coordinators to facilitate necessary workup for committee discussion.    # Afib with RVR, resolved.   - Continue Amiodarone and Digoxin.  - Warfarin with INR goal 2-3    # Moderate TR s/p TV annuloplasty with 32 mm MC3 partial ring 3/23/2023  PFO closure 3/23/2023  - Trend on ECHOs       # HTN.   - MAP within goal, continue the above regimen    Patient seen and discussed with attending physician, Dr. Shaun Aguiar. Please see his attestation for final plan.    Dakota Martinez MD  Cardiology  Fellow     I have seen and evaluated the patient and agree with the assessment and plan as above.  I appreciate the opportunity to participate in the care of Paras Malik . Please do not hesitate to contact me with any further questions.  I have spent a total of 40 minutes today reviewing labs, imaging studies, discussing with colleagues, face-to-face time with patient and documentation in the medical record.  The longitudinal plan of care for the diagnosis(es)/condition(s) as documented were addressed during this visit. Due to the added complexity in care, I will continue to support Nick in the subsequent management and with ongoing continuity of care.     Sincerely,   Shaun Aguiar MD  [facility] Division of Cardiology       Please do not hesitate to contact me if you have any questions/concerns.     Sincerely,     Shaun Aguiar MD

## 2024-09-03 NOTE — ED TRIAGE NOTES
BIBA from home after having 3-4 shocks from internal defibrillator.     Triage Assessment (Adult)       Row Name 09/03/24 1242          Triage Assessment    Airway WDL WDL        Respiratory WDL    Respiratory WDL WDL        Skin Circulation/Temperature WDL    Skin Circulation/Temperature WDL WDL        Cardiac WDL    Cardiac WDL X;chest pain        Chest Pain Assessment    Chest Pain Location anterior chest, left     Precipitating Factors nothing        Peripheral/Neurovascular WDL    Peripheral Neurovascular WDL WDL        Cognitive/Neuro/Behavioral WDL    Cognitive/Neuro/Behavioral WDL WDL

## 2024-09-03 NOTE — ED PROVIDER NOTES
[place] EMERGENCY DEPARTMENT ([facility])    9/03/24       ED PROVIDER NOTE   History     Chief Complaint   Patient presents with    Chest Pain     The history is provided by the patient and medical records.     Akshat Fragoso is a 59 year old male with a past medical history of CHF s/p LVAD (3/23/2023) & ICD (6/2017), atrial fibrillation, HTN, COPD, HLD, dyspnea on exertion, CKD stage 3, lumbar radiculopathy, and anemia who presents to the emergency department via EMS for firing of ICD. Per EMS, patient is vitally stable. Patient reports he presented earlier today to the [facility] for a ICD check up. He states he was told something was faulty with his ICD and had a x-ray done during this visit. However, he was sent home. When he was at home his ICD fired three times and EMS was called. He denies any symptoms before the firing of the ICD. He endorses chest pain due to being shocked by the ICD. His LVAD did not alarm. He denies a history of ICD misfiring. He is adhering to his medication regimen. He is anticoagulated on warfarin. He states his INR levels have been normal at an approximate value of 2.5. Denies shortness of breath, vomiting, sweating, diarrhea, falls, or injury.     Per chart review, patient presented to [facility] earlier today for a 5 month follow up for device check. A chest x-ray was performed with no notable findings. However, one of his defibrillator leads was found to be faulty. He was advised to follow up with Dr. Montgomery.     Past Medical History  Past Medical History:   Diagnosis Date    Acute right lumbar radiculopathy 11/02/2015    Acute systolic heart failure (H) 01/10/2017    Cardiomyopathy (H) 01/10/2017    CKD (chronic kidney disease) stage 3, GFR 30-59 ml/min (H) 01/30/2020    JOHNSON (dyspnea on exertion)     ED (erectile dysfunction) 10/02/2019    Erectile dysfunction     ICD (implantable cardioverter-defibrillator) in place- [employer], single chamber- NOT dependent 10/09/2017    Personal history of smoking 01/04/2017    Pulmonary nodules 01/17/2017    CT 11/2018:  Bilateral pulmonary nodules measuring up to 4 mm. No new or enlarging pulmonary nodules.     Past Surgical History:   Procedure Laterality Date    CARDIAC SURGERY  2016    defibrillator placement    COLONOSCOPY N/A 3/22/2023    Procedure: Colonoscopy;  Surgeon: Khushboo Crespo MD;  Location:  GI    CV INTRA AORTIC BALLOON N/A 3/22/2023    Procedure: Intraprocedure Aortic Balloon Pump Insertion;  Surgeon: Danie Snyder MD;  Location:  HEART CARDIAC CATH LAB    CV RIGHT HEART CATH MEASUREMENTS RECORDED N/A 11/20/2019    Procedure: CV RIGHT HEART CATH;  Surgeon: Jeff Aguilar MD;  Location:  HEART CARDIAC CATH LAB    CV RIGHT HEART CATH MEASUREMENTS RECORDED N/A 3/22/2023    Procedure: Right Heart Catheterization;  Surgeon: Danie Snyder MD;  Location:  HEART CARDIAC CATH LAB    CV RIGHT HEART CATH MEASUREMENTS RECORDED N/A 8/7/2023    Procedure: Heart Cath Right Heart Cath;  Surgeon: Paras Pang MD;  Location:  HEART CARDIAC CATH LAB    CV RIGHT HEART CATH MEASUREMENTS RECORDED N/A 4/1/2024    Procedure: Heart Cath Right Heart Cath;  Surgeon: Fadi Solares MD;  Location:  HEART CARDIAC CATH LAB    EP PACEMAKER OR ICD LEAD IMPLANT-ONE LEAD N/A 1/4/2024    Procedure: Pacemaker or Implantable Cardioverter Defibrillator Lead Implant - One Lead;  Surgeon: Charles Montgomery MD;  Location:  HEART CARDIAC CATH LAB    INSERT VENTRICULAR ASSIST DEVICE LEFT (HEARTMATE II) N/A 3/23/2023    Procedure: MEDIAN STERNOTOMY, TRANSESOPHAGEAL ECHOCARDIOGRAM PER ANESTHESIA, CARDIOPULMONARY BYPASS PUMP, INSERTION, LEFT VENTRICULAR ASSIST DEVICE (HEARTMATE IIl), TRICUSPID VALVE REPAIR WITH EDWRDS 32 MM MC3 TRICUSPID ANNULOPLASTY RING;  Surgeon: Elena Matias MD;  Location:  OR    None       acetaminophen  (TYLENOL) 325 MG tablet  amiodarone (PACERONE) 200 MG tablet  amoxicillin (AMOXIL) 500 MG tablet  bumetanide (BUMEX) 1 MG tablet  digoxin (LANOXIN) 250 MCG tablet  empagliflozin (JARDIANCE) 10 MG TABS tablet  eplerenone (INSPRA) 25 MG tablet  JANTOVEN ANTICOAGULANT 5 MG tablet  pantoprazole (PROTONIX) 20 MG EC tablet  sacubitril-valsartan (ENTRESTO) 24-26 MG per tablet  sildenafil (VIAGRA) 50 MG tablet      Allergies   Allergen Reactions    Codeine      Other reaction(s): GI intolerance, Intolerance-Can't Take     Family History  Family History   Problem Relation Age of Onset    Parkinsonism Mother     Atrial fibrillation Father     Heart Failure Father     Prostate Cancer Father     Skin Cancer Father     Anorexia nervosa Daughter     Other - See Comments Granddaughter         premature birth     Social History   Social History     Tobacco Use    Smoking status: Former     Current packs/day: 0.25     Average packs/day: 0.3 packs/day for 15.0 years (3.8 ttl pk-yrs)     Types: Cigarettes    Smokeless tobacco: Former     Quit date: 3/15/2023   Vaping Use    Vaping status: Never Used   Substance Use Topics    Alcohol use: No     Alcohol/week: 0.0 standard drinks of alcohol    Drug use: No      Past medical history, past surgical history, medications, allergies, family history, and social history were reviewed with the patient. No additional pertinent items.   ROS neg other than the symptoms noted above in the HPI.     Physical Exam      Physical Exam  Physical Exam   Constitutional: oriented to person, place, and time. appears well-developed and well-nourished.   HENT:   Head: Normocephalic and atraumatic.   Neck: Normal range of motion.   Pulmonary/Chest: Effort normal. No respiratory distress.   Cardiac: No murmurs, rubs, gallops. RRR.  Abdominal: Abdomen soft, nontender, nondistended. No rebound tenderness.  LVAD leads in place, no surrounding erythema or discharge.  MSK: Long bones without deformity or evidence of  trauma  Neurological: alert and oriented to person, place, and time.   Skin: Skin is warm and dry.   Psychiatric:  normal mood and affect.  behavior is normal. Thought content normal.      ED Course, Procedures, & Data      Procedures            EKG Interpretation:      Interpreted by Ming Asencio MD  Time reviewed: 1240  Symptoms at time of EKG: ICD shock   Rhythm: normal sinus   Rate: Tachycardia  Axis: Left Axis Deviation  Ectopy: none  Conduction: nonspecific interventricular conduction block  ST Segments/ T Waves: No acute ischemic changes  Q Waves: nonspecific  Comparison to prior: Unchanged    Clinical Impression: No Acute changes from prior EKG        Results for orders placed or performed in visit on 09/03/24   XR Chest 2 Views     Status: None    Narrative    XR CHEST 2 VIEWS  9/3/2024 9:46 AM     HISTORY:  ICD (implantable cardioverter-defibrillator) in place; Left  ventricular assist device present (H); Ventricular tachycardia (H);  AICD lead malfunction       COMPARISON:  1/5/24    TECHNIQUE: Upright PA and lateral views of the chest    FINDINGS:   Stable left chest wall cardiac defibrillator. Stable LVAD. Intact  sternotomy wire.      Trachea is midline. Cardiomediastinal silhouette is within normal  limits. No pleural effusion. No pneumothorax. No focal airspace  opacity.     The visualized upper abdomen is unremarkable. Normal bones and soft  tissues.        Impression    IMPRESSION:  Stable devices. Clear chest.     I have personally reviewed the examination and initial interpretation  and I agree with the findings.    CAITLIN RAO MD         SYSTEM ID:  [id number]   Results for orders placed or performed in visit on 09/03/24   INR     Status: Abnormal   Result Value Ref Range    INR 2.07 (H) 0.85 - 1.15   CBC with platelets     Status: Abnormal   Result Value Ref Range    WBC Count 13.5 (H) 4.0 - 11.0 10e3/uL    RBC Count 5.34 4.40 - 5.90 10e6/uL    Hemoglobin 14.6 13.3 - 17.7 g/dL     Hematocrit 46.8 40.0 - 53.0 %    MCV 88 78 - 100 fL    MCH 27.3 26.5 - 33.0 pg    MCHC 31.2 (L) 31.5 - 36.5 g/dL    RDW 18.4 (H) 10.0 - 15.0 %    Platelet Count 219 150 - 450 10e3/uL   Results for orders placed or performed in visit on 09/03/24   Cardiac Device Check - In Clinic (Standing ORD 8 count)     Status: None (In process)   Result Value Ref Range    Date Time Interrogation Session [id number]     Implantable Pulse Generator  [employer]     Implantable Pulse Generator Model D150 [employer]     Implantable Pulse Generator Serial Number [id number]     Type Interrogation Session In Clinic     Clinic Name [facility]     Implantable Pulse Generator Type Defibrillator     Implantable Pulse Generator Implant Date 20170608     Implantable Lead  [employer]     Implantable Lead Model 0273 [employer] [place] 4-Site S     Implantable Lead Serial Number [id number]     Implantable Lead Implant Date 20240104     Implantable Lead Polarity Type Bipolar Lead     Implantable Lead Location Detail 1 UNKNOWN     Implantable Lead Location Right Ventricle     Implantable Lead Connection Status Connected     Jesus Setting Mode (NBG Code) VVI     Jesus Setting Lower Rate Limit 40 [beats]/min    Lead Channel Setting Sensing Polarity Bipolar     Lead Channel Setting Sensing Sensitivity 0.6 mV    Lead Channel Setting Sensing Adaptation Mode Adaptive     Lead Channel Setting Pacing Polarity Bipolar     Lead Channel Setting Pacing Pulse Width 0.4 ms    Lead Channel Setting Pacing Amplitude 2.4 V    Zone Setting Type Category VF     Zone Setting Vendor Type Category VF     Zone Setting Status Active     Zone Setting Detection Interval 300 ms    Zone Setting Type Category VT     Zone Setting Vendor Type Category VT     Zone Setting Status Active     Zone Setting Detection Interval 353 ms    Zone Setting Type Category VT     Zone Setting Vendor Type Category VT-1     Zone Setting Type  Category VT     Zone Setting Type Category BRADYCARDIA     Lead Channel Status Check Lead     Lead Channel Impedance Value 306 ohm    Lead Channel Pacing Threshold Amplitude 0.9 V    Lead Channel Pacing Threshold Pulse Width 0.4 ms    Battery Date Time of Measurements 20240903085100     Battery Status Beginning of Service     Battery Remaining Longevity 114 mo    Battery Remaining Percentage 100 %    Capacitor Charge Type Reformation     Capacitor Last Charge Date Time 20240508045200     Capacitor Charge Time 10.6 s    Jesus Statistic Date Time Start 20240410000000     Jesus Statistic Date Time End 20240903000000     Jesus Statistic RV Percent Paced 0 %    Therapy Statistic Recent Shocks Delivered 0     Therapy Statistic Recent Shocks Aborted 0     Therapy Statistic Recent ATP Delivered 0     Therapy Statistic Recent Date Time Start 20240410000000     Therapy Statistic Recent Date Time End 20240903000000     Therapy Statistic Total Shocks Delivered 0     Therapy Statistic Total Shocks Aborted 0     Therapy Statistic Total ATP Delivered 1     Therapy Statistic Total  Date Time Start 20170608000000     Therapy Statistic Total  Date Time End 20240903000000     Episode Statistic Recent Count 2     Episode Statistic Type Category Other     Episode Statistic Recent Count 12     Episode Statistic Type Category VT     Episode Statistic Vendor Type Category NSVT     Episode Statistic Recent Count 0     Episode Statistic Type Category VT     Episode Statistic Vendor Type Category VT     Episode Statistic Recent Count 0     Episode Statistic Type Category VT     Episode Statistic Vendor Type Category VT-1     Episode Statistic Recent Date Time Start 20240410000000     Episode Statistic Recent Date Time End [id number]     Episode Statistic Recent Date Time Start 20240410000000     Episode Statistic Recent Date Time End [id number]     Episode Statistic Recent Date Time Start 20240410000000     Episode Statistic Recent  Date Time End 20240903000000     Episode Statistic Recent Date Time Start [id number]     Episode Statistic Recent Date Time End 20240903000000     Narrative    [place] seen [place] [facility] [place] and iterative programming of their ICD per MD orders. Upon interrogation there was an alert to check RV lead. While patient was resting comfortably in chair, RV lead channel appears to be picking up extra noise. Isometrics done with intermittent oversensing on RV lead. R waves have decreased from 10.7 mV to 4.8 mV. CXR ordered and Kay Shearer PA-C notified of findings. Patient had a RV lead removal and reimplant of new RV lead in Jan 2024. Tech services were contacted, they suggested these episodes to be P-Wave oversensing and recommended getting a CXR to confirm placement of lead.      Device: [employer] D150 DYNAGEN  Normal device function.  Mode: VVI 40 bpm  : <1%  Intrinsic rhythm: VS 80 bpm  Lead Trends Appear Stable: R waves have decreased from 10.7 mV to 4.8 mV. CXR ordered and Kay Shearer PA-C notified.   Estimated battery longevity to RRT = 9.5 years.   Anticoagulant: Warfarin  Ventricular Arrhythmia: 2 Monitored VT episodes lasting 10 sec. EGM available appear to show oversensing on RV lead channel.   10 NSVT episodes lasting 10 sec. EGM's available appeart to show oversensing on RV lead channel.  Setting Changes: None.  Patient has an appointment to see Dr. Aguiar today.   Plan: Device follow-up every 3 months.    YOLANDA Mas RN    Single lead ICD    I have reviewed and interpreted the device interrogation, settings, programming and nurse's summary. The device is functioning within normal device parameters. I agree with the current findings, assessment and plan.     Medications - No data to display  Labs Ordered and Resulted from Time of ED Arrival to Time of ED Departure - No data to display  No orders to display          Critical care was not performed.     Medical Decision Making  The patient's  presentation was of high complexity (an acute health issue posing potential threat to life or bodily function).    The patient's evaluation involved:  ordering and/or review of 3+ test(s) in this encounter (see separate area of note for details)  discussion of management or test interpretation with another health professional (electrophysiologist, cardiologist)    The patient's management necessitated high risk (a decision regarding hospitalization).    Assessment & Plan    MDM  Patient presenting with inappropriate ICD shocks.  He is stable here.  Electrophysiology came down and deactivated after finding that one of the leads is having mechanical problems.  He will be admitted as we cannot get this fixed today on the monitor.    I have reviewed the nursing notes. I have reviewed the findings, diagnosis, plan and need for follow up with the patient.    New Prescriptions    No medications on file       Final diagnoses:   Inappropriate discharge of implantable cardioverter-defibrillator (ICD), initial encounter   I, Coleen Augustine, am serving as a trained medical scribe to document services personally performed by Ming Asencio MD, based on the provider's statements to me.     IMing MD, was physically present and have reviewed and verified the accuracy of this note documented by Coleen Augustine.     Ming Asencio MD   [facility] EMERGENCY DEPARTMENT  9/3/2024     Ming Asencio MD  09/03/24 8102

## 2024-09-03 NOTE — CONSULTS
Electrophysiology Consultation Note   EP Attending: .   Reason for consultation: ICD shocks.   Provider requesting consultation: Dr.Jones THOMPSON.  Date of Service: 9/3/2024      HPI:   Mr. Fragoso is a 59 year old medical history significant for NICM LVEF 12%, VT/VF arrest on 3/15/2023, s/p DT LVAD 3/23/23, moderate TV annuloplasty  with 32 mm MC3 partial ring and PFO closure 3/23/2023, s/p ICD 6/8/17, HTN, COPD, CKD III, and chronic tobacco/marijuana use.  In January 2017, he was admitted with worsening JOHNSON and SOB and was found to have NICM. He had reported a 5 year history of JOHNSON that worsened over the last year. Coronary angiogram showed clean coronaries, and RHC with normal CI, and mildly elevated right and left heart filling pressures. He was started on optimal medical therapy. His JOHNSON/SOB improved with medications. Repeat CMRI shows an LVEF of 12% and RVEF of 30%. He had ICD implanted on 6/8/17. He suffered a VT/VF arrest on 3/15/2023 requiring CPR for 6 mins with cardiogenic shock. Ultimately had HM3 LVAD implantation as DT with concomitant TV annuloplasty and PFO closure on 3/23/2023 c/b postop AF (on amiodarone and digoxin) and HAP. He has now had recent decrease in pacing impedance after LVAD implant with substantial decrease in sensing and gradual increase in pacing threshold. Post LVAD his ICD lead had with substantial decrease in sensing and gradual increase in pacing threshold. Thus, he had ICD extraction and reimplant of new ICD lead on 1/2024. He did well at follow up and device check was stable. We were notified 9/3/2024 that patient had RV lead integrity alert. Device interrogation in clinic today showed R waves have decreased from 10.7 mV to 4.8 mV, 2 monitor VT and 10 NSVT episodes lasting 10 seconds with EGMs show oversensing on RV lead channel. Tech services evaluated and reported P-wave oversensing. CXR shows stable lead placement. Patient was returning home from a scheduled clinic  visit today and got 4 ICD shocks. Thus, presented to [facility]. Device interrogation here shows he got inappropriate ATP for RV lead oversensing. This triggered arrhyhtmia and 3 shocks were deployed with final shock terminating it. Patient had no LVAD alarms and no symptoms preceding events. VSS. Current cardiac medications include: Bumex, Eplerenone, Digoxin, Lisinopril, Jardiance, ASA, Amiodarone, and Warfarin.   Past Medical History:   Past Medical History:   Diagnosis Date    Acute right lumbar radiculopathy 11/02/2015    Acute systolic heart failure (H) 01/10/2017    Cardiomyopathy (H) 01/10/2017    CKD (chronic kidney disease) stage 3, GFR 30-59 ml/min (H) 01/30/2020    JOHNSON (dyspnea on exertion)     ED (erectile dysfunction) 10/02/2019    Erectile dysfunction     ICD (implantable cardioverter-defibrillator) in place- iNEWiT, single chamber- NOT dependent 10/09/2017    Personal history of smoking 01/04/2017    Pulmonary nodules 01/17/2017    CT 11/2018:  Bilateral pulmonary nodules measuring up to 4 mm. No new or enlarging pulmonary nodules.     Past Surgical History:   Past Surgical History:   Procedure Laterality Date    CARDIAC SURGERY  2016    defibrillator placement    COLONOSCOPY N/A 3/22/2023    Procedure: Colonoscopy;  Surgeon: Khushboo Crespo MD;  Location:  GI    CV INTRA AORTIC BALLOON N/A 3/22/2023    Procedure: Intraprocedure Aortic Balloon Pump Insertion;  Surgeon: Danie Snyder MD;  Location:  HEART CARDIAC CATH LAB    CV RIGHT HEART CATH MEASUREMENTS RECORDED N/A 11/20/2019    Procedure: CV RIGHT HEART CATH;  Surgeon: Jeff Aguilar MD;  Location:  HEART CARDIAC CATH LAB    CV RIGHT HEART CATH MEASUREMENTS RECORDED N/A 3/22/2023    Procedure: Right Heart Catheterization;  Surgeon: Danie Snyder MD;  Location:  HEART CARDIAC CATH LAB    CV RIGHT HEART CATH MEASUREMENTS RECORDED N/A 8/7/2023    Procedure: Heart Cath Right Heart Cath;  Surgeon:  Paras Pang MD;  Location:  HEART CARDIAC CATH LAB    CV RIGHT HEART CATH MEASUREMENTS RECORDED N/A 4/1/2024    Procedure: Heart Cath Right Heart Cath;  Surgeon: Fadi Solares MD;  Location:  HEART CARDIAC CATH LAB    EP PACEMAKER OR ICD LEAD IMPLANT-ONE LEAD N/A 1/4/2024    Procedure: Pacemaker or Implantable Cardioverter Defibrillator Lead Implant - One Lead;  Surgeon: Charles Montgomery MD;  Location:  HEART CARDIAC CATH LAB    INSERT VENTRICULAR ASSIST DEVICE LEFT (HEARTMATE II) N/A 3/23/2023    Procedure: MEDIAN STERNOTOMY, TRANSESOPHAGEAL ECHOCARDIOGRAM PER ANESTHESIA, CARDIOPULMONARY BYPASS PUMP, INSERTION, LEFT VENTRICULAR ASSIST DEVICE (HEARTMATE IIl), TRICUSPID VALVE REPAIR WITH EDWRDS 32 MM MC3 TRICUSPID ANNULOPLASTY RING;  Surgeon: Elena Matias MD;  Location:  OR    None       Allergies: Per MAR     Allergies   Allergen Reactions    Codeine      Other reaction(s): GI intolerance, Intolerance-Can't Take     Medications:   Per MAR current outpatient cardiovascular medications include: (Not in a hospital admission)    Current Outpatient Medications   Medication Sig Dispense Refill    acetaminophen (TYLENOL) 325 MG tablet Take 2 tablets (650 mg) by mouth every 4 hours as needed for other (For optimal non-opioid multimodal pain management to improve pain control.) 100 tablet 0    amiodarone (PACERONE) 200 MG tablet Take 0.5 tablets (100 mg) by mouth daily. 45 tablet 0    amoxicillin (AMOXIL) 500 MG tablet Take 4 tablets (2,000 mg) by mouth as needed (Take one hour before dental procedure.) 4 tablet 2    bumetanide (BUMEX) 1 MG tablet Take 1 tablet (1 mg) by mouth every other day 45 tablet 3    digoxin (LANOXIN) 250 MCG tablet Take 1 tablet (250 mcg) by mouth daily 30 tablet 11    empagliflozin (JARDIANCE) 10 MG TABS tablet Take 1 tablet (10 mg) by mouth daily 90 tablet 3    eplerenone (INSPRA) 25 MG tablet Take 12.5mg (1/2 tab) daily 45 tablet 3    JANTOVEN  ANTICOAGULANT 5 MG tablet       pantoprazole (PROTONIX) 20 MG EC tablet Take 1 tablet (20 mg) by mouth every morning (before breakfast) 90 tablet 3    sacubitril-valsartan (ENTRESTO) 24-26 MG per tablet Take 0.5 tablets by mouth 2 times daily Take 12-13mg (half tab) twice per day 45 tablet 0    sildenafil (VIAGRA) 50 MG tablet Take 1 tablet (50 mg) by mouth daily as needed (PRN for sexual activity) 30 tablet 0     No current facility-administered medications for this encounter.     Current Outpatient Medications   Medication Sig Dispense Refill    acetaminophen (TYLENOL) 325 MG tablet Take 2 tablets (650 mg) by mouth every 4 hours as needed for other (For optimal non-opioid multimodal pain management to improve pain control.) 100 tablet 0    amiodarone (PACERONE) 200 MG tablet Take 0.5 tablets (100 mg) by mouth daily. 45 tablet 0    amoxicillin (AMOXIL) 500 MG tablet Take 4 tablets (2,000 mg) by mouth as needed (Take one hour before dental procedure.) 4 tablet 2    bumetanide (BUMEX) 1 MG tablet Take 1 tablet (1 mg) by mouth every other day 45 tablet 3    digoxin (LANOXIN) 250 MCG tablet Take 1 tablet (250 mcg) by mouth daily 30 tablet 11    empagliflozin (JARDIANCE) 10 MG TABS tablet Take 1 tablet (10 mg) by mouth daily 90 tablet 3    eplerenone (INSPRA) 25 MG tablet Take 12.5mg (1/2 tab) daily 45 tablet 3    JANTOVEN ANTICOAGULANT 5 MG tablet       pantoprazole (PROTONIX) 20 MG EC tablet Take 1 tablet (20 mg) by mouth every morning (before breakfast) 90 tablet 3    sacubitril-valsartan (ENTRESTO) 24-26 MG per tablet Take 0.5 tablets by mouth 2 times daily Take 12-13mg (half tab) twice per day 45 tablet 0    sildenafil (VIAGRA) 50 MG tablet Take 1 tablet (50 mg) by mouth daily as needed (PRN for sexual activity) 30 tablet 0     Family History:   Family History   Problem Relation Age of Onset    Parkinsonism Mother     Atrial fibrillation Father     Heart Failure Father     Prostate Cancer Father     Skin Cancer  "Father     Anorexia nervosa Daughter     Other - See Comments Granddaughter         premature birth     Social History:   Social History     Tobacco Use    Smoking status: Former     Current packs/day: 0.25     Average packs/day: 0.3 packs/day for 15.0 years (3.8 ttl pk-yrs)     Types: Cigarettes    Smokeless tobacco: Former     Quit date: 3/15/2023   Substance Use Topics    Alcohol use: No     Alcohol/week: 0.0 standard drinks of alcohol       ROS:   A comprehensive 10 point ROS was negative other than as mentioned in HPI.    Physical Examination:   VITALS: /78   Pulse 103   Temp 98.3  F (36.8  C) (Oral)   Resp 18   SpO2 92%   GENERAL APPEARANCE: AxO, NAD   HEENT: NCAT, EOMI, MMM. PERRLA.   NECK: Supple.  CHEST: CTAB   CARDIOVASCULAR: S1S2, Reg, VAD Hum.   ABDOMEN: BS+, soft, NT, ND, drive line site CDI.  EXTREMITIES: No pedal edema. Distal pulses intact.   NEURO: Grossly nonfocal.   PSYCH: Normal affect.  SKIN: Warm and dry.   Data:   Labs:  BMP  Recent Labs   Lab 09/03/24  1242      POTASSIUM 4.2   *     CBC  Recent Labs   Lab 09/03/24  1242 09/03/24  1239 09/03/24  1009   WBC  --  15.0* 13.5*   RBC  --  5.41 5.34   HGB 15.6 14.8 14.6   HCT 46 48.0 46.8   MCV  --  89 88   MCH  --  27.4 27.3   MCHC  --  30.8* 31.2*   RDW  --  18.5* 18.4*   PLT  --  240 219     INR  Recent Labs   Lab 09/03/24  1009 08/28/24  0000   INR 2.07* 2.7     No results found for: \"CKTOTAL\", \"CKMB\", \"TROPN\"  Cholesterol (mg/dL)   Date Value   03/20/2023 111   03/18/2023 126   11/19/2018 164   01/10/2017 124     HDL Cholesterol (mg/dL)   Date Value   11/19/2018 38 (L)   01/10/2017 39 (L)     Direct Measure HDL (mg/dL)   Date Value   03/20/2023 39 (L)   03/18/2023 33 (L)     LDL Cholesterol Calculated (mg/dL)   Date Value   03/20/2023 55   03/18/2023 74   11/19/2018 70   01/10/2017 64     5/3/23 Echo:   Interpretation Summary  Please refer to the EPIC report for measurements performed at different LVAD  speed " settings.  HM3 at 5200RPM at baseline.  LVIDd 43mm.  Septum normal.  Aortic valve remain closed at baseline.     4/2017 CMR:  IMPRESSION:  1.  Severely dilated left ventricle with severely reduced global  function with a calculated ejection fraction of  12 %.  2.  Moderately dilated right ventricle with moderately reduced global  function with a calculated ejection fraction of 30%.   3.  There is mild mitral and tricuspid regurgitation.  4.  The left atrium is severely dilated. The right atrium is mildly  dilated.  5.  On delayed enhancement imaging, there is extensive  hyperenhancement in multiple non-ischemic patterns: transmural,  mid-myocardial, subendocardial, and epicardial. 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 The extent and  distribution of hyperenhancement are unchanged from the prior study.  6.  Compared with the prior study dated 1/11/2017, right ventricular  function has improved.     Assessment:   Mr. Fragoso is a 59 year old medical history significant for NICM LVEF 12%, VT/VF arrest on 3/15/2023, s/p DT LVAD 3/23/23, moderate TV annuloplasty  with 32 mm MC3 partial ring and PFO closure 3/23/2023, s/p ICD 6/8/17, HTN, COPD, CKD III, and chronic tobacco/marijuana use.  In January 2017, he was admitted with worsening JOHNSON and SOB and was found to have NICM. He had reported a 5 year history of JOHNSON that worsened over the last year. Coronary angiogram showed clean coronaries, and RHC with normal CI, and mildly elevated right and left heart filling pressures. He was started on optimal medical therapy. His JOHNSON/SOB improved with medications. Repeat CMRI shows an LVEF of 12% and RVEF of 30%. He had ICD implanted on 6/8/17. He suffered a VT/VF arrest on 3/15/2023 requiring CPR for 6 mins with cardiogenic shock. Ultimately had HM3 LVAD implantation as DT with concomitant TV annuloplasty and PFO closure on 3/23/2023 c/b postop AF (on amiodarone and digoxin) and HAP. He has now had recent  decrease in pacing impedance after LVAD implant with substantial decrease in sensing and gradual increase in pacing threshold. Post LVAD his ICD lead had with substantial decrease in sensing and gradual increase in pacing threshold. Thus, he had ICD extraction and reimplant of new ICD lead on 1/2024. He did well at follow up and device check was stable. We were notified 9/3/2024 that patient had RV lead integrity alert. Device interrogation in clinic today showed R waves have decreased from 10.7 mV to 4.8 mV, 2 monitor VT and 10 NSVT episodes lasting 10 seconds with EGMs show oversensing on RV lead channel. Tech services evaluated and reported P-wave oversensing. CXR shows stable lead placement. Patient was returning home from a scheduled clinic visit today and got 4 ICD shocks. Thus, presented to [facility]. Device interrogation here shows he got inappropriate ATP for RV lead oversensing. This triggered arrhyhtmia and 3 shocks were deployed with final shock terminating it. Patient had no LVAD alarms and no symptoms preceding events. VSS. Current cardiac medications include: Bumex, Eplerenone, Digoxin, Lisinopril, Jardiance, ASA, Amiodarone, and Warfarin.   EP Recommendations:  NICM s/p DT LVAD 3/2023 LVEF 12%  RV lead dysfunction:  1. ACEi/ARB/ARNi: Continue Lisinopril.  2. BB: Not on d/t RV dysfunction.   3. Aldosterone antagonist: Continue Eplerenone.  4. STLG2i: Continue Jardiance.  5.  SCD prophylaxis: s/p ICD 2017. He then had a recent decrease in pacing impedance after LVAD implant with substantial decrease in sensing and gradual increase in pacing threshold. We discussed lead is showing signs of progressive microperforation. Thus, he had an extraction and reimplant, which he underwent in Jan '24. Now with RV lead oversensing resulting in inappropriate ICD therapies. Tachy-therapies currently OFF while admitted. We are unable to  unipolar given LVAD. Decreased sensitivity on lead to lowest possible but  still oversensing occurring on lead. Discussed that he will need to have new ICD lead placed. We discussed with the patient the rationale for new ICD lead placement, alternative therapies,  technical aspects of the surgical procedure, risks/benefits of therapy and need for long-term follow-up in the Device Clinic.  The risk of new ICD lead placement include: over sedation, reaction to local anesthetic, reaction to narcotics or benzodiazipines used for moderate secation, localized bleeding, internal bleeding, collapsed lung, and acute or late infections. There is the possibilty of unforseen complications as well such as device or lead failure, lead dislodgement, and inappropriate shocks from the defibrillator. After our discussion, the patient verbalized understanding and wishes to proceed with new ICD lead placement. Will work with cath lab to find time to coordinate this.   6. Fluid status: Continue Bumex  7. Etiology: NICM     Post-op AF after LVAD surgery   Prior VT/VF arrest pre LVAD:   Patch monitor in May '23 showed no AF. Also has no recent ventricular arrhythmias. Has been on amiodarone since LVAD. Given lack of recent atrial and/or ventricular arrhythmias. He is on amiodarone 100 mg daily and can consider further weaning/stopped as outpatient.     The patient states understanding and is agreeable with plan.   Thank you for allowing us to participate in the care of this patient.     The patient was discussed w/ Dr. Montgomery.  The above note reflects our joint plan.    VERONICA Hood CNP  Electrophysiology Consult Service  Securely message with [employer]   Text page via [employer]     RABIA Total time spent on patient visit, reviewing notes, imaging, labs, orders, and completing necessary documentation: 70 minutes.  >50% of visit spent on counseling patient and/or coordination of care.    EP STAFF NOTE  I have seen and examined the patient as part of a shared visit with LARISSA Hood NP.  I  agree with the note above. I reviewed today's vital signs and medications. I have reviewed and discussed with the advanced practice provider their physical examination, assessment, and plan   Briefly, LVAD, RV lead dysfunction with inappropriate shocks  My key history/exam findings are: LVAD.   The key management decisions made by me: RV lead replacement, possible generator replacement to [employer] for the additional ability to change sensing between dedicated and integrated.  Total time spent on patient visit, reviewing notes, imaging, labs, orders, and completing necessary documentation: 30 minutes.  >50% of visit spent on counseling patient and/or coordination of care.     Charles Montgomery MD [facility]  Cardiology - Electrophysiology

## 2024-09-03 NOTE — PROGRESS NOTES
Landry, Akshat's wife just called to say Akshat was in Dr Aguiar's clinic this morning and had the EP nurse tell him one of his defibrillator leads was faulty. Akshat was sent home knowing he would need to follow up with Dr Montgomery and EP. When he got home he was shocked by his ICD x 3. Landry said he was awake and walking ut to the ambulance. He called an ambulance and is now being brought to the [facility] ED.

## 2024-09-03 NOTE — Clinical Note
Called to [facility] on 6B. All questions answered. All belongings sent with patient. Patient transported to 6B via stretcher with [facility] RN and transport.

## 2024-09-03 NOTE — TELEPHONE ENCOUNTER
ENTRESTO 24-26MG TABS     Last Written Prescription Date:  7/24/24  Last Fill Quantity: 45,   # refills: 0  Last Office Visit : 7/24/24  Future Office visit:  9/3/24    Routing refill request to provider for review/approval because:  ENTRESTO 24-26MG TABS began 7/24/24, appt today 9/3/24-? Increase dosage or stay at same dosage?

## 2024-09-03 NOTE — NURSING NOTE
" MCS VAD Pump Info       Row Name 09/03/24 1000             MCS VAD Information    Implant LVAD      LVAD Pump HeartMate 3         Heartmate 3 LEFT VS    Flow (Lpm) 4.1 Lpm      Pulse Index (PI) 4.1 PI      Speed (rpm) 5100 rpm      Power (mari) 3.7 mari      Current Hct setting 47      Retired: Unexpected Alarms --         Primary Controller    Serial number [id number]      Low flow alarm setting 2.5      High watt alarm setting --      EBB: Patient use 13      Replace in 12 Months         Backup Controller    Serial number [id number]      EBB: Patient use 4      Replace EBB in 13 Months      Speed & HCT match primary controller --         VAD Interrogation    Alarms reported by patient N      Unexpected alarms noted upon interrogation None      PI events Occasional  Hx back 8 days, PI range 1.9-5.2, no speed drops      Damage to equipment is noted --      Action taken --         Driveline Exit Site    Dressing change done Y      Driveline properly secured Yes      DLES assessment --      Dressing used Daily kit      Frequency patient changes dressing Every other day      Dressing modifications --      Dressing change supplier --                  Patient reported skin irritation proximal to driveline site. Assessed DLES; no skin concerns immediately surrounding site. However, skin irritation noted around border of dressing and anchor. Pt endorses \"ripping\" adhesive off of his skin, as the adhesive remover wipes tend to sting. Encouraged slow removal of dressing with adhesive remover as tolerated, as well as increased dry time and application of skin protectant prior to dressing application. Pt will plan to change dressing q3days or PRN to promote healing of irritated skin.     Education Complete: Yes   Charge the BACKUP controller s backup battery every 6 months  Perform a self test on BACKUP every 6 months  Change the MPU s batteries every 6 months:Yes  Have equipment serviced yearly (if " applicable):Yes    Reviewed Heartmate battery maintenance. Hand out given to patient regarding weekly, monthly, and yearly maintenance.

## 2024-09-03 NOTE — PROGRESS NOTES
Indication of Interrogation:  Other, ICD shocks    Type of VAD:  Heartmate 3    Current Parameters:  Flow= 4.4 lpm, Speed= 5100 rpm, Power= 3.6 mari, PI = 2.8 (2-5,5)    Abnormal Alarm on History:  No    Abnormal Events/Parameters Notes:  Yes, explain: there were many PI events with one speed drop to the low speed limit of 4900.    Changes Made during Interrogation:  No     Logfile Wavelforms from 8/27/24 - 9/3/2024.    Logfile Waveforms from 12/22/2023 to 8/5/2024

## 2024-09-03 NOTE — H&P
[facility]    Cardiology History and Physical - Cardiology 2         Date of Admission:  9/3/2024    Assessment & Plan: SL    Akshat Fragoso is a 59yoM with PMH of HFrEF 2/2 NICM (diagnosed 1/2017) s/p ICD placement 6/2017, VT/VF arrest in 3/2023 requiring 6 minutes of CPR with cardiogenic shock s/p HM3 LVAD implantation as destination therapy 3/23/2023 c/b postop Afib (on amio and dig) and HAP, moderate TR s/p TV annuloplasty, PFO closure, chronic tobacco/marijuana use, COPD, HTN, and CKD 3 who presented from cardiology clinic after receiving shocks from his ICD.     # RV lead dysfunction  # Inappropriate ICD shocks  # ICD placement 2017 for NICM  # VT/VF arrest 3/2023  # Lead replacement 1/2024  Was asymptomatic on morning of admission, seen in cardiology clinic without concerns (ICD check at that time was normal). Returned home and received 4 shocks from his ICD. Came to ED for evaluation. Per EP evaluation, patient's RV lead is oversensing, resulting in inappropriate ICD therapies. Attempted to decrease sensitivity to lowest setting but still had oversensing. Now planned for ICD lead replacement.   - EP consulted, appreciate recs  - Plan for ICD lead replacement 9/4    # Chronic systolic heart failure 2/2 NICM s/p HM3 LVAD 3/2023 for cardiogenic shock as destination therapy  # ACC/AHA Stage D, NYHA Class III  # Moderate TR s/p TV annuloplasty with 32 mm MC3 partial ring 3/2023  No clinical signs or symptoms of acute heart failure. Appears euvolemic on exam. Continuing home regimen.   - Last TTE 12/27/2023: Borderline dilated LV with severely reduced global LV function, LVEF < 20%; RV function mildly reduced  - RHC 4/1/2024:   RA: --/--/15 mm Hg  RV: 60/15 mm Hg  PA: 60/30 (43) mm Hg  PCWP: --/--/24 mm Hg  CO/CI: 3.6/1.8 (TD); 3.3/1.7 (Carlos)   PVR: 5.2 (TD) LING  Right sided filling pressures are moderately elevated. Left sided filling pressures are moderately  "elevated. Moderately elevated pulmonary artery hypertension. Reduced cardiac output level.   - Volume status: euvolemic  - Diuresis: continue PTA bumex 1 mg every other day (next dose 9/4/2024)  - ACEi/ARB/ARNi: Entresto prescribed, but patient has not started yet due to lisinopril washout, will not start at this time  - BB: deferred given recent LVAD implant  - MRA: continue PTA eplerenone 12.5 mg daily  - SGLT2i: continue PTA empagliflozin 10 mg daily  - Device: See above regarding ICD  - Anticoagulation: continue PTA warfarin (goal INR 2-3, currently therapeutic)  - Antiplatelet: Not on ASA  - Renal function: near baseline on admission with creatinine of 1.3  - VAD parameters:   > Flow ~4.2   > Speed 5100 rpm   > Power ~3.5 W   > PI ~2.9   > Alarms: none    # Afib  - Continue PTA amiodarone and digoxin  - Digoxin level in AM  - Anticoagulation with warfarin as above         Diet: Low Saturated Fat Na <2400 mg  NPO per Anesthesia Guidelines for Procedure/Surgery Except for: Meds  DVT Prophylaxis: Warfarin  Adkins Catheter: Not present  Cardiac Monitoring: ACTIVE order. Indication: Tachyarrhythmias, acute (48 hours)  Code Status: Full Code        Clinically Significant Risk Factors Present on Admission               # Drug Induced Coagulation Defect: home medication list includes an anticoagulant medication    # Hypertension: Home medication list includes antihypertensive(s)  # End stage heart failure: Ventricular assist device (VAD) present        # Overweight: Estimated body mass index is 28.66 kg/m  as calculated from the following:    Height as of this encounter: 1.702 m (5' 7\").    Weight as of this encounter: 83 kg (183 lb).        # Pacemaker present          Disposition Plan   Expected discharge: in 2-3 days, recommended to prior living arrangement once  EP procedure is completed .    Entered: Buck Trimble MD 09/03/2024, 5:28 PM   The patient's care was discussed with the Attending Physician, Dr. Aguilar " .      Buck Trimble MD  PGY3, Internal Medicine  Cardiology 2 Service  [facility]    ______________________________________________________________________    Chief Complaint   ICD shocks    History is obtained from the patient    History of Present Illness   Akshat Fragoso is a 59yoM with PMH of HFrEF 2/2 NICM (diagnosed 1/2017) s/p ICD placement 6/2017, VT/VF arrest in 3/2023 requiring 6 minutes of CPR with cardiogenic shock s/p HM3 LVAD implantation as destination therapy 3/23/2023 c/b postop Afib (on amio and dig) and HAP, moderate TR s/p TV annuloplasty, PFO closure, chronic tobacco/marijuana use, COPD, HTN, and CKD 3 who presented from cardiology clinic after receiving shocks from his ICD.     He was seen in clinic earlier today with Dr. Aguiar. Was feeling completely normal at that time. No acute concerns in clinic. Had been eating/drinking well, urinating well, and no recent infections or illness. He returned home, and as soon as he did, he felt 4 ICD shocks within 30 seconds. He came to the ED for further evaluation.     On arrival, he was hemodynamically stable. Did not have recurrence of shocks. Seen by EP with concern for ICD lead dysfunction. Plan for ICD lead replacement, but unable to be completed today. Will admit for procedure on 9/4.     Patient denied fevers/chills, driveline concerns, chest pain, SOB, abd, pain, or dysuria.     Past Medical History    Past Medical History:   Diagnosis Date    Acute right lumbar radiculopathy 11/02/2015    Acute systolic heart failure (H) 01/10/2017    Cardiomyopathy (H) 01/10/2017    CKD (chronic kidney disease) stage 3, GFR 30-59 ml/min (H) 01/30/2020    JOHNSON (dyspnea on exertion)     ED (erectile dysfunction) 10/02/2019    Erectile dysfunction     ICD (implantable cardioverter-defibrillator) in place- [place], single chamber- NOT dependent 10/09/2017    Personal history of smoking 01/04/2017    Pulmonary  nodules 01/17/2017    CT 11/2018:  Bilateral pulmonary nodules measuring up to 4 mm. No new or enlarging pulmonary nodules.       Past Surgical History   Past Surgical History:   Procedure Laterality Date    CARDIAC SURGERY  2016    defibrillator placement    COLONOSCOPY N/A 3/22/2023    Procedure: Colonoscopy;  Surgeon: Khushboo Crespo MD;  Location:  GI    CV INTRA AORTIC BALLOON N/A 3/22/2023    Procedure: Intraprocedure Aortic Balloon Pump Insertion;  Surgeon: Danie Snyder MD;  Location:  HEART CARDIAC CATH LAB    CV RIGHT HEART CATH MEASUREMENTS RECORDED N/A 11/20/2019    Procedure: CV RIGHT HEART CATH;  Surgeon: Jeff Aguilar MD;  Location:  HEART CARDIAC CATH LAB    CV RIGHT HEART CATH MEASUREMENTS RECORDED N/A 3/22/2023    Procedure: Right Heart Catheterization;  Surgeon: Danie Snyder MD;  Location:  HEART CARDIAC CATH LAB    CV RIGHT HEART CATH MEASUREMENTS RECORDED N/A 8/7/2023    Procedure: Heart Cath Right Heart Cath;  Surgeon: Paras Pang MD;  Location:  HEART CARDIAC CATH LAB    CV RIGHT HEART CATH MEASUREMENTS RECORDED N/A 4/1/2024    Procedure: Heart Cath Right Heart Cath;  Surgeon: Fadi Solares MD;  Location:  HEART CARDIAC CATH LAB    EP PACEMAKER OR ICD LEAD IMPLANT-ONE LEAD N/A 1/4/2024    Procedure: Pacemaker or Implantable Cardioverter Defibrillator Lead Implant - One Lead;  Surgeon: Charles Montgomery MD;  Location:  HEART CARDIAC CATH LAB    INSERT VENTRICULAR ASSIST DEVICE LEFT (HEARTMATE II) N/A 3/23/2023    Procedure: MEDIAN STERNOTOMY, TRANSESOPHAGEAL ECHOCARDIOGRAM PER ANESTHESIA, CARDIOPULMONARY BYPASS PUMP, INSERTION, LEFT VENTRICULAR ASSIST DEVICE (HEARTMATE IIl), TRICUSPID VALVE REPAIR WITH EDWRDS 32 MM MC3 TRICUSPID ANNULOPLASTY RING;  Surgeon: Elena Matisa MD;  Location:  OR    None         Prior to Admission Medications   Prior to Admission Medications   Prescriptions Last Dose Informant  Patient Reported? Taking?   JANTOVEN ANTICOAGULANT 5 MG tablet   Yes No   acetaminophen (TYLENOL) 325 MG tablet  Self No No   Sig: Take 2 tablets (650 mg) by mouth every 4 hours as needed for other (For optimal non-opioid multimodal pain management to improve pain control.)   amiodarone (PACERONE) 200 MG tablet   No No   Sig: Take 0.5 tablets (100 mg) by mouth daily.   amoxicillin (AMOXIL) 500 MG tablet   No No   Sig: Take 4 tablets (2,000 mg) by mouth as needed (Take one hour before dental procedure.)   bumetanide (BUMEX) 1 MG tablet   No No   Sig: Take 1 tablet (1 mg) by mouth every other day   digoxin (LANOXIN) 250 MCG tablet  Self No No   Sig: Take 1 tablet (250 mcg) by mouth daily   empagliflozin (JARDIANCE) 10 MG TABS tablet  Self No No   Sig: Take 1 tablet (10 mg) by mouth daily   eplerenone (INSPRA) 25 MG tablet  Self No No   Sig: Take 12.5mg (1/2 tab) daily   pantoprazole (PROTONIX) 20 MG EC tablet  Self No No   Sig: Take 1 tablet (20 mg) by mouth every morning (before breakfast)   sacubitril-valsartan (ENTRESTO) 24-26 MG per tablet   No No   Sig: Take 0.5 tablets by mouth 2 times daily Take 12-13mg (half tab) twice per day   sildenafil (VIAGRA) 50 MG tablet   No No   Sig: Take 1 tablet (50 mg) by mouth daily as needed (PRN for sexual activity)      Facility-Administered Medications: None        Review of Systems    ROS as noted in HPI     Physical Exam   Vital Signs: Temp: 98.3  F (36.8  C) Temp src: Oral BP: 104/78 Pulse: 79   Resp: 18 SpO2: 92 % O2 Device: None (Room air)    Weight: 183 lbs 0 oz    Gen: No acute distress, lying in bed  HEENT: Normocephalic, atraumatic  CV: LVAD hum audible, no JVD  Pulm: CTAB, non-labored breathing on room air  Abd: Soft, non-tender to palpation  Extr: No pitting edema in bilateral lower extremities  Neuro: Alert and conversant  Skin/MSK: Driveline site with clean and dry dressing, no discharge or surrounding skin changes, ICD site without notable erythema or overlying  skin abnormalities    Medical Decision Making       Please see A&P for additional details of medical decision making.      Data   ------------------------- PAST 24 HR DATA REVIEWED -----------------------------------------------    I have personally reviewed the following data over the past 24 hrs:    15.0 (H)  \   15.6   / 240     135 97 (L) 20.7 (H) /  131 (H)   4.2 22 1.30 (H) \     Trop: 32 (H) BNP: 2,071 (H)     TSH: 2.60 T4: N/A A1C: N/A     INR:  2.11 (H) PTT:  N/A   D-dimer:  N/A Fibrinogen:  N/A     Ferritin:  N/A % Retic:  N/A LDH:  273 (H)       Imaging results reviewed over the past 24 hrs:   Recent Results (from the past 24 hour(s))   XR Chest 2 Views    Narrative    XR CHEST 2 VIEWS  9/3/2024 9:46 AM     HISTORY:  ICD (implantable cardioverter-defibrillator) in place; Left  ventricular assist device present (H); Ventricular tachycardia (H);  AICD lead malfunction       COMPARISON:  1/5/24    TECHNIQUE: Upright PA and lateral views of the chest    FINDINGS:   Stable left chest wall cardiac defibrillator. Stable LVAD. Intact  sternotomy wire.      Trachea is midline. Cardiomediastinal silhouette is within normal  limits. No pleural effusion. No pneumothorax. No focal airspace  opacity.     The visualized upper abdomen is unremarkable. Normal bones and soft  tissues.        Impression    IMPRESSION:  Stable devices. Clear chest.     I have personally reviewed the examination and initial interpretation  and I agree with the findings.    CAITLIN RAO MD         SYSTEM ID:  [id number]   Cardiac Device Check - In Clinic (Standing ORD 8 count)   Result Value    Date Time Interrogation Session [id number]    Implantable Pulse Generator  [employer]    Implantable Pulse Generator Model D150 [employer]    Implantable Pulse Generator Serial Number [id number]    Type Interrogation Session In Clinic    Clinic Name [facility]    Implantable Pulse Generator Type Defibrillator     Implantable Pulse Generator Implant Date 20170608    Implantable Lead  [employer]    Implantable Lead Model 0273 [employer] ([employer] [place] 4-Site S    Implantable Lead Serial Number [id number]    Implantable Lead Implant Date 20240104    Implantable Lead Polarity Type Bipolar Lead    Implantable Lead Location Detail 1 UNKNOWN    Implantable Lead Location Right Ventricle    Implantable Lead Connection Status Connected    Jesus Setting Mode (NBG Code) VVI    Jesus Setting Lower Rate Limit 40    Lead Channel Setting Sensing Polarity Bipolar    Lead Channel Setting Sensing Sensitivity 0.6    Lead Channel Setting Sensing Adaptation Mode Adaptive    Lead Channel Setting Pacing Polarity Bipolar    Lead Channel Setting Pacing Pulse Width 0.4    Lead Channel Setting Pacing Amplitude 2.4    Zone Setting Type Category VF    Zone Setting Vendor Type Category VF    Zone Setting Status Active    Zone Setting Detection Interval 300    Zone Setting Type Category VT    Zone Setting Vendor Type Category VT    Zone Setting Status Active    Zone Setting Detection Interval 353    Zone Setting Type Category VT    Zone Setting Vendor Type Category VT-1    Zone Setting Type Category VT    Zone Setting Type Category BRADYCARDIA    Lead Channel Status Check Lead    Lead Channel Impedance Value 306    Lead Channel Pacing Threshold Amplitude 0.9    Lead Channel Pacing Threshold Pulse Width 0.4    Battery Date Time of Measurements 20240903085100    Battery Status Beginning of Service    Battery Remaining Longevity 114    Battery Remaining Percentage 100    Capacitor Charge Type Reformation    Capacitor Last Charge Date Time [id number]    Capacitor Charge Time 10.6    Jesus Statistic Date Time Start [id number]    Jesus Statistic Date Time End 20240903000000    Jesus Statistic RV Percent Paced 0    Therapy Statistic Recent Shocks Delivered 0    Therapy Statistic Recent Shocks Aborted 0    Therapy Statistic Recent ATP Delivered 0     Therapy Statistic Recent Date Time Start 20240410000000    Therapy Statistic Recent Date Time End 20240903000000    Therapy Statistic Total Shocks Delivered 0    Therapy Statistic Total Shocks Aborted 0    Therapy Statistic Total ATP Delivered 1    Therapy Statistic Total  Date Time Start 20170608000000    Therapy Statistic Total  Date Time End 20240903000000    Episode Statistic Recent Count 2    Episode Statistic Type Category Other    Episode Statistic Recent Count 12    Episode Statistic Type Category VT    Episode Statistic Vendor Type Category NSVT    Episode Statistic Recent Count 0    Episode Statistic Type Category VT    Episode Statistic Vendor Type Category VT    Episode Statistic Recent Count 0    Episode Statistic Type Category VT    Episode Statistic Vendor Type Category VT-1    Episode Statistic Recent Date Time Start 20240410000000    Episode Statistic Recent Date Time End 20240903000000    Episode Statistic Recent Date Time Start 20240410000000    Episode Statistic Recent Date Time End 20240903000000    Episode Statistic Recent Date Time Start 20240410000000    Episode Statistic Recent Date Time End 20240903000000    Episode Statistic Recent Date Time Start 20240410000000    Episode Statistic Recent Date Time End 20240903000000    Narrative    Patient seen in [facility] [place], [place] for evaluation and iterative programming of their ICD per MD orders. Upon interrogation there was an alert to check RV lead. While patient was resting comfortably in chair, RV lead channel appears to be picking up extra noise. Isometrics done with intermittent oversensing on RV lead. R waves have decreased from 10.7 mV to 4.8 mV. CXR ordered and Kay Shearer PA-C notified of findings. Patient had a RV lead removal and reimplant of new RV lead in Jan 2024. Tech services were contacted, they suggested these episodes to be P-Wave oversensing and recommended getting a CXR to confirm placement of lead.      Device: [employer] D150  Candescent HealingLAKHWINDER  Normal device function.  Mode: VVI 40 bpm  : <1%  Intrinsic rhythm: VS 80 bpm  Lead Trends Appear Stable: R waves have decreased from 10.7 mV to 4.8 mV. CXR ordered and Kay Shearer PA-C notified.   Estimated battery longevity to RRT = 9.5 years.   Anticoagulant: Warfarin  Ventricular Arrhythmia: 2 Monitored VT episodes lasting 10 sec. EGM available appear to show oversensing on RV lead channel.   10 NSVT episodes lasting 10 sec. EGM's available appeart to show oversensing on RV lead channel.  Setting Changes: None.  Patient has an appointment to see Dr. Aguiar today.   Plan: Device follow-up every 3 months.    YOLANDA Mas RN    Single lead ICD    I have reviewed and interpreted the device interrogation, settings, programming and nurse's summary. The device is functioning within normal device parameters. I agree with the current findings, assessment and plan.   Cardiac Device Check - Inpatient   Result Value    Date Time Interrogation Session [id number]    Implantable Pulse Generator  [employer]    Implantable Pulse Generator Model D150 [employer]    Implantable Pulse Generator Serial Number [id number]    Type Interrogation Session In Clinic    Clinic Name [facility]    Implantable Pulse Generator Type Defibrillator    Implantable Pulse Generator Implant Date 20170608    Implantable Lead  [employer]    Implantable Lead Model 0273 [employer] [place] 4-Site S    Implantable Lead Serial Number [id number]    Implantable Lead Implant Date 20240104    Implantable Lead Polarity Type Bipolar Lead    Implantable Lead Location Detail 1 UNKNOWN    Implantable Lead Location Right Ventricle    Implantable Lead Connection Status Connected    Jesus Setting Mode (NBG Code) VVI    Jesus Setting Lower Rate Limit 40    Lead Channel Setting Sensing Polarity Bipolar    Lead Channel Setting Sensing Sensitivity 1.5    Lead Channel Setting Sensing Adaptation Mode Adaptive     Lead Channel Setting Pacing Polarity Bipolar    Lead Channel Setting Pacing Pulse Width 0.4    Lead Channel Setting Pacing Amplitude 2.4    Zone Setting Type Category VF    Zone Setting Vendor Type Category VF    Zone Setting Status Inactive    Zone Setting Type Category VT    Zone Setting Vendor Type Category VT    Zone Setting Status Inactive    Lead Channel Impedance Value 287    Battery Date Time of Measurements 20240903132200    Battery Status Beginning of Service    Battery Remaining Longevity 120    Battery Remaining Percentage 100    Capacitor Charge Type Reformation    Capacitor Last Charge Date Time 20240903120000    Capacitor Charge Time 10.7    Capacitor Charge Type Shock    Capacitor Last Charge Date Time 20240903120100    Capacitor Charge Time 7.4    Capacitor Charge Energy 41    Jesus Statistic Date Time Start 20240410000000    Jesus Statistic Date Time End 20240903000000    Jesus Statistic RV Percent Paced 0    Therapy Statistic Recent Shocks Delivered 4    Therapy Statistic Recent Shocks Aborted 0    Therapy Statistic Recent ATP Delivered 1    Therapy Statistic Recent Date Time Start 20240410000000    Therapy Statistic Recent Date Time End 20240903000000    Therapy Statistic Total Shocks Delivered 4    Therapy Statistic Total Shocks Aborted 0    Therapy Statistic Total ATP Delivered 2    Therapy Statistic Total  Date Time Start 20170608000000    Therapy Statistic Total  Date Time End 20240903000000    Episode Statistic Recent Count 3    Episode Statistic Type Category Other    Episode Statistic Recent Count 12    Episode Statistic Type Category VT    Episode Statistic Vendor Type Category NSVT    Episode Statistic Recent Count 1    Episode Statistic Type Category VT    Episode Statistic Vendor Type Category VT    Episode Statistic Recent Count 0    Episode Statistic Type Category VT    Episode Statistic Vendor Type Category VT-1    Episode Statistic Recent Date Time Start 20240410000000    Episode  Statistic Recent Date Time End 20240903000000    Episode Statistic Recent Date Time Start 20240410000000    Episode Statistic Recent Date Time End 20240903000000    Episode Statistic Recent Date Time Start 20240410000000    Episode Statistic Recent Date Time End 20240903000000    Episode Statistic Recent Date Time Start 20240410000000    Episode Statistic Recent Date Time End 20240903000000    Narrative    Patient seen in the [facility] ER #1 for evaluation and iterative programming of their ICD per MD orders. Pt received 4 shocks from his ICD while removing his shoes after getting home from the clinic. The EGM shows oversensing causing device to deliver ATP in the VT zone. The ATP put him into VT at 260 bpm. The 4th 41j shock broke the arrhythmia. Even with the sensitivity at 1.5 mV there was frequent oversensing noted. Per Yumiko Dalton NP, orders the ICD TACHY THERAPIES TURNED OFF. ER staff notified. This lead was implanted 1/4/24. The impedance has decreased from 495 ohms to 287 ohms, and the R waves have decreased from 10 mV to 3 mV.    Device: [employer] D150 ISABELLE Washington RN    Single lead ICD    I have reviewed and interpreted the device interrogation, settings, programming and nurse's summary. The device is functioning within normal device parameters. I agree with the current findings, assessment and plan.

## 2024-09-03 NOTE — PHARMACY-ANTICOAGULATION SERVICE
Clinical Pharmacy - Warfarin Dosing Consult     Pharmacy has been consulted to manage this patient s warfarin therapy.  Indication: LVAD/RVAD  Therapy Goal: INR 2-3  OP Anticoag Clinic: [facility] anticoagulation clinic  Warfarin Prior to Admission: Yes  Warfarin PTA Regimen: 5mg MWF, 2.5mg all other other days  Significant drug interactions: amiodarone  Recent documented change in oral intake/nutrition: Unknown    INR   Date Value Ref Range Status   09/03/2024 2.11 (H) 0.85 - 1.15 Final   09/03/2024 2.07 (H) 0.85 - 1.15 Final       Recommend warfarin 2.5 mg today.  Pharmacy will monitor Akshat Fragoso daily and order warfarin doses to achieve specified goal.      Please contact pharmacy as soon as possible if the warfarin needs to be held for a procedure or if the warfarin goals change.      Juan Miguel Maxwell, PharmD, BCPS

## 2024-09-04 ENCOUNTER — TELEPHONE (OUTPATIENT)
Dept: CARDIOLOGY | Facility: CLINIC | Age: 59
End: 2024-09-04
Payer: COMMERCIAL

## 2024-09-04 LAB
ANION GAP SERPL CALCULATED.3IONS-SCNC: 9 MMOL/L (ref 7–15)
BUN SERPL-MCNC: 16 MG/DL (ref 6–20)
CALCIUM SERPL-MCNC: 8.9 MG/DL (ref 8.8–10.4)
CHLORIDE SERPL-SCNC: 101 MMOL/L (ref 98–107)
CREAT SERPL-MCNC: 1.06 MG/DL (ref 0.67–1.17)
DIGOXIN SERPL-MCNC: 1.9 NG/ML (ref 0.6–2)
EGFRCR SERPLBLD CKD-EPI 2021: 82 ML/MIN/1.73M2
ERYTHROCYTE [DISTWIDTH] IN BLOOD BY AUTOMATED COUNT: 18.4 % (ref 10–15)
GLUCOSE SERPL-MCNC: 93 MG/DL (ref 70–99)
HCO3 SERPL-SCNC: 24 MMOL/L (ref 22–29)
HCT VFR BLD AUTO: 48.2 % (ref 40–53)
HGB BLD-MCNC: 14.6 G/DL (ref 13.3–17.7)
INR PPP: 2.22 (ref 0.85–1.15)
LDH SERPL L TO P-CCNC: 302 U/L (ref 0–250)
MAGNESIUM SERPL-MCNC: 2.1 MG/DL (ref 1.7–2.3)
MCH RBC QN AUTO: 26.9 PG (ref 26.5–33)
MCHC RBC AUTO-ENTMCNC: 30.3 G/DL (ref 31.5–36.5)
MCV RBC AUTO: 89 FL (ref 78–100)
PLATELET # BLD AUTO: 198 10E3/UL (ref 150–450)
POTASSIUM SERPL-SCNC: 4.3 MMOL/L (ref 3.4–5.3)
POTASSIUM SERPL-SCNC: 4.3 MMOL/L (ref 3.4–5.3)
RBC # BLD AUTO: 5.42 10E6/UL (ref 4.4–5.9)
SODIUM SERPL-SCNC: 134 MMOL/L (ref 135–145)
WBC # BLD AUTO: 10.9 10E3/UL (ref 4–11)

## 2024-09-04 PROCEDURE — 85610 PROTHROMBIN TIME: CPT

## 2024-09-04 PROCEDURE — 250N000013 HC RX MED GY IP 250 OP 250 PS 637

## 2024-09-04 PROCEDURE — 36415 COLL VENOUS BLD VENIPUNCTURE: CPT

## 2024-09-04 PROCEDURE — 250N000013 HC RX MED GY IP 250 OP 250 PS 637: Performed by: STUDENT IN AN ORGANIZED HEALTH CARE EDUCATION/TRAINING PROGRAM

## 2024-09-04 PROCEDURE — 99233 SBSQ HOSP IP/OBS HIGH 50: CPT | Mod: 24 | Performed by: INTERNAL MEDICINE

## 2024-09-04 PROCEDURE — 75827 VEIN X-RAY CHEST: CPT | Performed by: INTERNAL MEDICINE

## 2024-09-04 PROCEDURE — 83735 ASSAY OF MAGNESIUM: CPT

## 2024-09-04 PROCEDURE — 85027 COMPLETE CBC AUTOMATED: CPT

## 2024-09-04 PROCEDURE — B5171ZZ FLUOROSCOPY OF LEFT SUBCLAVIAN VEIN USING LOW OSMOLAR CONTRAST: ICD-10-PCS | Performed by: INTERNAL MEDICINE

## 2024-09-04 PROCEDURE — 999N000248 HC STATISTIC IV INSERT WITH US BY RN

## 2024-09-04 PROCEDURE — 120N000005 HC R&B MS OVERFLOW UMMC

## 2024-09-04 PROCEDURE — 80048 BASIC METABOLIC PNL TOTAL CA: CPT

## 2024-09-04 PROCEDURE — 83615 LACTATE (LD) (LDH) ENZYME: CPT

## 2024-09-04 PROCEDURE — 75710 ARTERY X-RAYS ARM/LEG: CPT | Performed by: INTERNAL MEDICINE

## 2024-09-04 PROCEDURE — 36005 INJECTION EXT VENOGRAPHY: CPT

## 2024-09-04 PROCEDURE — 80162 ASSAY OF DIGOXIN TOTAL: CPT

## 2024-09-04 PROCEDURE — 93005 ELECTROCARDIOGRAM TRACING: CPT

## 2024-09-04 PROCEDURE — 250N000011 HC RX IP 250 OP 636: Performed by: INTERNAL MEDICINE

## 2024-09-04 RX ORDER — OXYCODONE AND ACETAMINOPHEN 5; 325 MG/1; MG/1
1 TABLET ORAL EVERY 6 HOURS PRN
COMMUNITY

## 2024-09-04 RX ORDER — NALOXONE HYDROCHLORIDE 0.4 MG/ML
0.4 INJECTION, SOLUTION INTRAMUSCULAR; INTRAVENOUS; SUBCUTANEOUS
Status: DISCONTINUED | OUTPATIENT
Start: 2024-09-04 | End: 2024-09-06 | Stop reason: HOSPADM

## 2024-09-04 RX ORDER — LIDOCAINE 40 MG/G
CREAM TOPICAL
Status: DISCONTINUED | OUTPATIENT
Start: 2024-09-04 | End: 2024-09-04

## 2024-09-04 RX ORDER — IOPAMIDOL 755 MG/ML
INJECTION, SOLUTION INTRAVASCULAR
Status: DISCONTINUED | OUTPATIENT
Start: 2024-09-04 | End: 2024-09-04 | Stop reason: HOSPADM

## 2024-09-04 RX ORDER — CEFAZOLIN SODIUM 2 G/100ML
2 INJECTION, SOLUTION INTRAVENOUS
Status: CANCELLED | OUTPATIENT
Start: 2024-09-04

## 2024-09-04 RX ORDER — SODIUM CHLORIDE 9 MG/ML
INJECTION, SOLUTION INTRAVENOUS CONTINUOUS
Status: CANCELLED | OUTPATIENT
Start: 2024-09-04

## 2024-09-04 RX ORDER — LISINOPRIL 5 MG/1
5 TABLET ORAL DAILY
Status: DISCONTINUED | OUTPATIENT
Start: 2024-09-04 | End: 2024-09-06 | Stop reason: HOSPADM

## 2024-09-04 RX ORDER — LISINOPRIL 5 MG/1
5 TABLET ORAL DAILY
COMMUNITY

## 2024-09-04 RX ORDER — LIDOCAINE 40 MG/G
CREAM TOPICAL
Status: CANCELLED | OUTPATIENT
Start: 2024-09-04

## 2024-09-04 RX ORDER — NALOXONE HYDROCHLORIDE 0.4 MG/ML
0.2 INJECTION, SOLUTION INTRAMUSCULAR; INTRAVENOUS; SUBCUTANEOUS
Status: DISCONTINUED | OUTPATIENT
Start: 2024-09-04 | End: 2024-09-06 | Stop reason: HOSPADM

## 2024-09-04 RX ORDER — SODIUM CHLORIDE 9 MG/ML
INJECTION, SOLUTION INTRAVENOUS CONTINUOUS
Status: DISCONTINUED | OUTPATIENT
Start: 2024-09-04 | End: 2024-09-04 | Stop reason: HOSPADM

## 2024-09-04 RX ORDER — WARFARIN SODIUM 5 MG/1
5 TABLET ORAL
Status: COMPLETED | OUTPATIENT
Start: 2024-09-04 | End: 2024-09-04

## 2024-09-04 RX ADMIN — OXYCODONE HYDROCHLORIDE AND ACETAMINOPHEN 1 TABLET: 5; 325 TABLET ORAL at 12:46

## 2024-09-04 RX ADMIN — Medication 12.5 MG: at 11:36

## 2024-09-04 RX ADMIN — OXYCODONE HYDROCHLORIDE AND ACETAMINOPHEN 1 TABLET: 5; 325 TABLET ORAL at 20:04

## 2024-09-04 RX ADMIN — OXYCODONE HYDROCHLORIDE AND ACETAMINOPHEN 1 TABLET: 5; 325 TABLET ORAL at 23:13

## 2024-09-04 RX ADMIN — OXYCODONE HYDROCHLORIDE AND ACETAMINOPHEN 1 TABLET: 5; 325 TABLET ORAL at 08:24

## 2024-09-04 RX ADMIN — LISINOPRIL 5 MG: 5 TABLET ORAL at 14:20

## 2024-09-04 RX ADMIN — EMPAGLIFLOZIN 10 MG: 10 TABLET, FILM COATED ORAL at 08:17

## 2024-09-04 RX ADMIN — OXYCODONE HYDROCHLORIDE AND ACETAMINOPHEN 1 TABLET: 5; 325 TABLET ORAL at 16:46

## 2024-09-04 RX ADMIN — PANTOPRAZOLE SODIUM 20 MG: 20 TABLET, DELAYED RELEASE ORAL at 08:17

## 2024-09-04 RX ADMIN — AMIODARONE HYDROCHLORIDE 100 MG: 100 TABLET ORAL at 11:35

## 2024-09-04 RX ADMIN — SENNOSIDES AND DOCUSATE SODIUM 1 TABLET: 8.6; 5 TABLET ORAL at 20:05

## 2024-09-04 RX ADMIN — DIGOXIN 250 MCG: 125 TABLET ORAL at 08:17

## 2024-09-04 RX ADMIN — OXYCODONE HYDROCHLORIDE AND ACETAMINOPHEN 1 TABLET: 5; 325 TABLET ORAL at 04:41

## 2024-09-04 RX ADMIN — OXYCODONE HYDROCHLORIDE AND ACETAMINOPHEN 1 TABLET: 5; 325 TABLET ORAL at 00:10

## 2024-09-04 RX ADMIN — WARFARIN SODIUM 5 MG: 5 TABLET ORAL at 18:26

## 2024-09-04 ASSESSMENT — ACTIVITIES OF DAILY LIVING (ADL)
ADLS_ACUITY_SCORE: 20

## 2024-09-04 NOTE — PLAN OF CARE
Hours of care [phone]    Neuro: A&Ox4.   Cardiac: HM 3 LVAD. VSS.   Respiratory: Sating >92% on RA.  GI/: Adequate urine output.   Diet/appetite: NPO since midnight for ICD lead replacement.  Activity:  Independent.  Pain: C/o rib/chest pain d/t shocks. PRN percocet x2,   Skin: No new deficits noted.  LDA's: L PIV: SL    Plan: Continue with POC. Notify primary team with changes.

## 2024-09-04 NOTE — PROGRESS NOTES
[facility]  CARDIOLOGY HEART FAILURE SERVICE (CARDS II) PROGRESS NOTE    Patient Name: Akshat Fragoso    Medical Record Number: [id number]    YOB: 1965  PCP: Darian Estevez    Admit Date/Time: 9/3/2024 12:32 PM   1    Assessment and Plan:  Akshat Fragoso is a 59yoM with PMH of HFrEF 2/2 NICM (diagnosed 1/2017) s/p ICD placement 6/2017, VT/VF arrest in 3/2023 requiring 6 minutes of CPR with cardiogenic shock s/p HM3 LVAD implantation as destination therapy 3/23/2023 c/b postop Afib (on amio and dig) and HAP, moderate TR s/p TV annuloplasty, PFO closure, chronic tobacco/marijuana use, COPD, HTN, and CKD 3 who presented from cardiology clinic after receiving shocks from his ICD.      # RV lead dysfunction  # Inappropriate ICD shocks  # ICD placement 2017 for NICM  # VT/VF arrest 3/2023  # Lead replacement 1/2024    - EP consulted, venogram done today  - Plan for ICD lead replacement, date TBD     # Chronic systolic heart failure 2/2 NICM s/p HM3 LVAD 3/2023 for cardiogenic shock as destination therapy  # ACC/AHA Stage D, NYHA Class III  # Moderate TR s/p TV annuloplasty with 32 mm MC3 partial ring 3/2023  No clinical signs or symptoms of acute heart failure. Appears euvolemic on exam. Continuing home regimen.   - Last TTE 12/27/2023: Borderline dilated LV with severely reduced global LV function, LVEF < 20%; RV function mildly reduced  - RHC 4/1/2024:   RA: --/--/15 mm Hg  RV: 60/15 mm Hg  PA: 60/30 (43) mm Hg  PCWP: --/--/24 mm Hg  CO/CI: 3.6/1.8 (TD); 3.3/1.7 (Carlos)   PVR: 5.2 (TD) LING  Right sided filling pressures are moderately elevated. Left sided filling pressures are moderately elevated. Moderately elevated pulmonary artery hypertension. Reduced cardiac output level.   - Volume status: euvolemic  - Diuresis: continue PTA bumex 1 mg every other day   - ACEi/ARB/ARNi: Entresto prescribed, but patient has not started yet due to lisinopril washout, will not start at this  time  - BB: deferred given recent LVAD implant  - MRA: continue PTA eplerenone 12.5 mg daily  - SGLT2i: continue PTA empagliflozin 10 mg daily  - Device: See above regarding ICD  - Anticoagulation: continue PTA warfarin (goal INR 2-3, currently therapeutic)  - Antiplatelet: Not on ASA  - Renal function: near baseline  - VAD parameters:  Heartmate 3 LEFT VS  Flow (Lpm): 4.4 Lpm  Pulse Index (PI): 3.5 PI  Speed (rpm): 5100 rpm  Power (mari): 3.6 mari  Current Hct settin      # Afib  - Continue PTA amiodarone and digoxin  - Digoxin level therapeutic  - Anticoagulation with warfarin as above    Pt was discussed and evaluated with Jeff Sevilla MD, attending physician, who agrees with the assessment and plan above.     Interval Changes in Past 24 Hours:   No acute overnight events.    Review Of Systems  A 4-point ROS was negative aside from those listed above.    OBJECTIVE FINDINGS:    Temp:  [97.7  F (36.5  C)-98  F (36.7  C)] 98  F (36.7  C)  Pulse:  [58-79] 74  Resp:  [16-18] 18  SpO2:  [92 %-99 %] 92 %    Gen: Patient is awake and alert, NAD. Appears comfortable.    Neck: JVP estimated at clavicle  Resp: clear to auscultation bilaterally, no crackles or wheezing   CV: LVAD hum  Abd: soft, NT, ND, no hepatosplenomegaly, normal BS  Ext: warm and well perfused, no LE edema    Intake/Output Summary (Last 24 hours) at 2024 1312  Last data filed at 2024 1159  Gross per 24 hour   Intake 240 ml   Output --   Net 240 ml     Wt Readings from Last 5 Encounters:   24 85.1 kg (187 lb 9.8 oz)   24 81.6 kg (180 lb)   24 86.8 kg (191 lb 4.8 oz)   04/10/24 88.6 kg (195 lb 6.4 oz)   04/10/24 85.7 kg (189 lb)       Current medications   Current Facility-Administered Medications   Medication Dose Route Frequency Provider Last Rate Last Admin    acetaminophen (TYLENOL) tablet 650 mg  650 mg Oral Q4H PRN Buck Trimble MD   650 mg at 24    amiodarone (PACERONE) tablet 100 mg  100 mg  Oral Daily Buck Trimble MD   100 mg at 09/04/24 1135    bumetanide (BUMEX) tablet 1 mg  1 mg Oral Every Other Day Buck Trimble MD        calcium carbonate (TUMS) chewable tablet 1,000 mg  1,000 mg Oral 4x Daily PRN Buck Trimble MD        digoxin (LANOXIN) tablet 250 mcg  250 mcg Oral Daily Buck Trimble MD   250 mcg at 09/04/24 0817    empagliflozin (JARDIANCE) tablet 10 mg  10 mg Oral Daily Buck Trimble MD   10 mg at 09/04/24 0817    eplerenone (INSPRA) half-tab 12.5 mg  12.5 mg Oral Daily Buck Trimble MD   12.5 mg at 09/04/24 1136    lidocaine (LMX4) cream   Topical Q1H PRN Buck Trimble MD        lidocaine 1 % 0.1-1 mL  0.1-1 mL Other Q1H PRN Buck Trimble MD        naloxone (NARCAN) injection 0.2 mg  0.2 mg Intravenous Q2 Min PRN Jeff Aguilar MD        Or    naloxone (NARCAN) injection 0.4 mg  0.4 mg Intravenous Q2 Min PRN Jeff Aguilar MD        Or    naloxone (NARCAN) injection 0.2 mg  0.2 mg Intramuscular Q2 Min PRN Jeff Aguilar MD        Or    naloxone (NARCAN) injection 0.4 mg  0.4 mg Intramuscular Q2 Min PRN Jeff Aguilar MD        oxyCODONE-acetaminophen (PERCOCET) 5-325 MG per tablet 1 tablet  1 tablet Oral Q3H PRN Laurel Munoz MD   1 tablet at 09/04/24 1246    pantoprazole (PROTONIX) EC tablet 20 mg  20 mg Oral QAM AC Buck Trimble MD   20 mg at 09/04/24 0817    Patient is already receiving anticoagulation with heparin, enoxaparin (LOVENOX), warfarin (COUMADIN)  or other anticoagulant medication   Does not apply Continuous PRN Buck Trimble MD        senna-docusate (SENOKOT-S/PERICOLACE) 8.6-50 MG per tablet 1 tablet  1 tablet Oral BID PRN Buck Trimble MD        Or    senna-docusate (SENOKOT-S/PERICOLACE) 8.6-50 MG per tablet 2 tablet  2 tablet Oral BID PRN Buck Trimble MD        sodium chloride (PF) 0.9% PF flush 3 mL  3 mL Intracatheter Q8H Buck Trimble MD   3 mL at 09/04/24 0011    sodium chloride (PF) 0.9% PF flush 3 mL  3 mL Intracatheter q1  Buck Saravia MD        warfarin ANTICOAGULANT (COUMADIN) tablet 5 mg  5 mg Oral ONCE at 18:00 Paras Levy RPH        Warfarin Dose Required Daily - Pharmacist Managed  1 each Oral See Admin Instructions Buck rTimble MD           LABS Reviewed  IMAGES Reviewed

## 2024-09-04 NOTE — PROGRESS NOTES
Admitted/transferred from: ED  2 RN full   skin assessment completed by Robin Still, WILDER and Trena TOVAR RN from 6B.  Skin assessment finding: skin intact, no problems   Interventions/actions: other encouraged frequent weight shift. Otherwise, independent for activity.      Will continue to monitor.

## 2024-09-04 NOTE — PLAN OF CARE
"Goal Outcome Evaluation:    /78   Pulse 79   Temp 97.7  F (36.5  C) (Oral)   Resp 18   Ht 1.702 m (5' 7\")   Wt 83 kg (183 lb)   SpO2 95%   BMI 28.66 kg/m      Transferred from ED at around 2145. Oriented to unit, room number, call light and tv control, dietary hours and menu. Insisted safety measures. LVAD heartmate 3, patient owned equipment labeled.   "

## 2024-09-04 NOTE — TELEPHONE ENCOUNTER
Left Voicemail (1st Attempt) and Sent Mychart (1st Attempt) for the patient to call back and schedule the following:    Appointment type: Return VAD  Provider: Cosme  Return date: 3/5/25  Specialty phone number: [phone] opt 1  Additional appointment(s) needed: device check and labs prior  Additonal Notes: follow up with VAD RABIA w/labs prior on Dec 2024

## 2024-09-04 NOTE — PHARMACY-ADMISSION MEDICATION HISTORY
Pharmacist Admission Medication History    Admission medication history is complete. The information provided in this note is only as accurate as the sources available at the time of the update.    Information Source(s): Patient via in-person    Pertinent Information:   - patient has not stopped taking his lisinopril 5 mg daily in order to start taking Entresto as prescribed.  He knows he needs a 36 hour washout period before Entresto can be started. His last reported dose of lisinopril was 9/3/24 in the morning.  He states he does not want to transition at this time given his hospitalization.     Changes made to PTA medication list:  Added:   Lisinopril 5 mg tablet (plan was to transition to Entresto, but patient has not done this yet and continues on lisinopril)  Percocet 5/325 mg  Deleted: None  Changed:   Bumetanide 1 mg every other day -> 0.5 mg every other day    Allergies reviewed with patient and updates made in EHR: yes    Medication History Completed By: Paras Levy [facility] 9/4/2024 12:48 PM    Prior to Admission medications    Medication Sig Last Dose Taking? Auth Provider   acetaminophen (TYLENOL) 325 MG tablet Take 2 tablets (650 mg) by mouth every 4 hours as needed for other (For optimal non-opioid multimodal pain management to improve pain control.) Past Month at unknown Yes Mike Ralph PA-C   amiodarone (PACERONE) 200 MG tablet Take 0.5 tablets (100 mg) by mouth daily. 9/3/2024 at AM Yes Shaun Aguiar MD   amoxicillin (AMOXIL) 500 MG tablet Take 4 tablets (2,000 mg) by mouth as needed (Take one hour before dental procedure.) Unknown Yes Shaun Aguiar MD   bumetanide (BUMEX) 1 MG tablet Take 1 tablet (1 mg) by mouth every other day  Patient taking differently: Take 0.5 mg by mouth every other day. 9/2/2024 at AM Yes Shaun Aguiar MD   digoxin (LANOXIN) 250 MCG tablet Take 1 tablet (250 mcg) by mouth daily 9/3/2024 at AM Yes Shaun Aguiar MD   empagliflozin (JARDIANCE) 10 MG TABS tablet Take 1  tablet (10 mg) by mouth daily 9/3/2024 at AM Yes Shaun Aguiar MD   eplerenone (INSPRA) 25 MG tablet Take 12.5mg (1/2 tab) daily 9/3/2024 at AM Yes Shaun Aguiar MD   JANTOVEN ANTICOAGULANT 5 MG tablet Take 2.5-5 mg by mouth daily. Take 5 mg on Mondays, Wednesdays, and Fridays and 2.5 mg all other days of the week or as directed by your anticoagulation clinic. 9/2/2024 at PM Yes Reported, Patient   lisinopril (ZESTRIL) 5 MG tablet Take 5 mg by mouth daily. 9/3/2024 at AM Yes Unknown, Entered By History   oxyCODONE-acetaminophen (PERCOCET) 5-325 MG tablet Take 1 tablet by mouth every 6 hours as needed for severe pain. Past Month at unknown Yes Unknown, Entered By History   pantoprazole (PROTONIX) 20 MG EC tablet Take 1 tablet (20 mg) by mouth every morning (before breakfast) 9/3/2024 at AM Yes Shaun Aguiar MD   sacubitril-valsartan (ENTRESTO) 24-26 MG per tablet Take 0.5 tablets by mouth 2 times daily Take 12-13mg (half tab) twice per day  at Has not started  Elba Chen PA-C   sildenafil (VIAGRA) 50 MG tablet Take 1 tablet (50 mg) by mouth daily as needed (PRN for sexual activity)  at Has not taken  Shaun Aguiar MD

## 2024-09-05 ENCOUNTER — APPOINTMENT (OUTPATIENT)
Dept: CARDIOLOGY | Facility: CLINIC | Age: 59
DRG: 277 | End: 2024-09-05
Attending: STUDENT IN AN ORGANIZED HEALTH CARE EDUCATION/TRAINING PROGRAM
Payer: COMMERCIAL

## 2024-09-05 LAB
ALBUMIN SERPL BCG-MCNC: 3.8 G/DL (ref 3.5–5.2)
ALP SERPL-CCNC: 70 U/L (ref 40–150)
ALT SERPL W P-5'-P-CCNC: 30 U/L (ref 0–70)
ANION GAP SERPL CALCULATED.3IONS-SCNC: 9 MMOL/L (ref 7–15)
AST SERPL W P-5'-P-CCNC: 47 U/L (ref 0–45)
BILIRUB DIRECT SERPL-MCNC: <0.2 MG/DL (ref 0–0.3)
BILIRUB SERPL-MCNC: 0.6 MG/DL
BUN SERPL-MCNC: 13.7 MG/DL (ref 6–20)
CALCIUM SERPL-MCNC: 8.6 MG/DL (ref 8.8–10.4)
CHLORIDE SERPL-SCNC: 100 MMOL/L (ref 98–107)
CREAT SERPL-MCNC: 1.03 MG/DL (ref 0.67–1.17)
EGFRCR SERPLBLD CKD-EPI 2021: 85 ML/MIN/1.73M2
ERYTHROCYTE [DISTWIDTH] IN BLOOD BY AUTOMATED COUNT: 18.1 % (ref 10–15)
GLUCOSE SERPL-MCNC: 98 MG/DL (ref 70–99)
HCO3 SERPL-SCNC: 26 MMOL/L (ref 22–29)
HCT VFR BLD AUTO: 43.9 % (ref 40–53)
HGB BLD-MCNC: 13.7 G/DL (ref 13.3–17.7)
INR PPP: 2.26 (ref 0.85–1.15)
LDH SERPL L TO P-CCNC: 256 U/L (ref 0–250)
LVEF ECHO: NORMAL
MAGNESIUM SERPL-MCNC: 2.1 MG/DL (ref 1.7–2.3)
MCH RBC QN AUTO: 27.3 PG (ref 26.5–33)
MCHC RBC AUTO-ENTMCNC: 31.2 G/DL (ref 31.5–36.5)
MCV RBC AUTO: 88 FL (ref 78–100)
PLATELET # BLD AUTO: 201 10E3/UL (ref 150–450)
POTASSIUM SERPL-SCNC: 4.4 MMOL/L (ref 3.4–5.3)
PROT SERPL-MCNC: 6.4 G/DL (ref 6.4–8.3)
RBC # BLD AUTO: 5.01 10E6/UL (ref 4.4–5.9)
SODIUM SERPL-SCNC: 135 MMOL/L (ref 135–145)
WBC # BLD AUTO: 11.2 10E3/UL (ref 4–11)

## 2024-09-05 PROCEDURE — 36415 COLL VENOUS BLD VENIPUNCTURE: CPT

## 2024-09-05 PROCEDURE — 83735 ASSAY OF MAGNESIUM: CPT

## 2024-09-05 PROCEDURE — 93321 DOPPLER ECHO F-UP/LMTD STD: CPT | Mod: 26 | Performed by: INTERNAL MEDICINE

## 2024-09-05 PROCEDURE — 80048 BASIC METABOLIC PNL TOTAL CA: CPT

## 2024-09-05 PROCEDURE — 250N000013 HC RX MED GY IP 250 OP 250 PS 637: Performed by: STUDENT IN AN ORGANIZED HEALTH CARE EDUCATION/TRAINING PROGRAM

## 2024-09-05 PROCEDURE — 33249 INSJ/RPLCMT DEFIB W/LEAD(S): CPT | Performed by: INTERNAL MEDICINE

## 2024-09-05 PROCEDURE — C1894 INTRO/SHEATH, NON-LASER: HCPCS | Performed by: INTERNAL MEDICINE

## 2024-09-05 PROCEDURE — 258N000003 HC RX IP 258 OP 636: Performed by: INTERNAL MEDICINE

## 2024-09-05 PROCEDURE — 272N000001 HC OR GENERAL SUPPLY STERILE: Performed by: INTERNAL MEDICINE

## 2024-09-05 PROCEDURE — 250N000013 HC RX MED GY IP 250 OP 250 PS 637

## 2024-09-05 PROCEDURE — 250N000011 HC RX IP 250 OP 636: Performed by: INTERNAL MEDICINE

## 2024-09-05 PROCEDURE — 0JH63FZ INSERTION OF SUBCUTANEOUS DEFIBRILLATOR LEAD INTO CHEST SUBCUTANEOUS TISSUE AND FASCIA, PERCUTANEOUS APPROACH: ICD-10-PCS | Performed by: INTERNAL MEDICINE

## 2024-09-05 PROCEDURE — 88300 SURGICAL PATH GROSS: CPT | Mod: 26 | Performed by: STUDENT IN AN ORGANIZED HEALTH CARE EDUCATION/TRAINING PROGRAM

## 2024-09-05 PROCEDURE — 80053 COMPREHEN METABOLIC PANEL: CPT

## 2024-09-05 PROCEDURE — 85027 COMPLETE CBC AUTOMATED: CPT

## 2024-09-05 PROCEDURE — 82248 BILIRUBIN DIRECT: CPT | Performed by: STUDENT IN AN ORGANIZED HEALTH CARE EDUCATION/TRAINING PROGRAM

## 2024-09-05 PROCEDURE — 0JH608Z INSERTION OF DEFIBRILLATOR GENERATOR INTO CHEST SUBCUTANEOUS TISSUE AND FASCIA, OPEN APPROACH: ICD-10-PCS | Performed by: INTERNAL MEDICINE

## 2024-09-05 PROCEDURE — 99153 MOD SED SAME PHYS/QHP EA: CPT | Performed by: INTERNAL MEDICINE

## 2024-09-05 PROCEDURE — 120N000005 HC R&B MS OVERFLOW UMMC

## 2024-09-05 PROCEDURE — 250N000009 HC RX 250: Performed by: INTERNAL MEDICINE

## 2024-09-05 PROCEDURE — 83615 LACTATE (LD) (LDH) ENZYME: CPT

## 2024-09-05 PROCEDURE — 33241 REMOVE PULSE GENERATOR: CPT

## 2024-09-05 PROCEDURE — 99233 SBSQ HOSP IP/OBS HIGH 50: CPT | Mod: 24 | Performed by: INTERNAL MEDICINE

## 2024-09-05 PROCEDURE — 33244 REMOVE ELCTRD TRANSVENOUSLY: CPT

## 2024-09-05 PROCEDURE — 85610 PROTHROMBIN TIME: CPT

## 2024-09-05 PROCEDURE — 93325 DOPPLER ECHO COLOR FLOW MAPG: CPT

## 2024-09-05 PROCEDURE — 250N000013 HC RX MED GY IP 250 OP 250 PS 637: Performed by: NURSE PRACTITIONER

## 2024-09-05 PROCEDURE — C1722 AICD, SINGLE CHAMBER: HCPCS | Performed by: INTERNAL MEDICINE

## 2024-09-05 PROCEDURE — 93308 TTE F-UP OR LMTD: CPT | Mod: 26 | Performed by: INTERNAL MEDICINE

## 2024-09-05 PROCEDURE — 0JPT3FZ REMOVAL OF SUBCUTANEOUS DEFIBRILLATOR LEAD FROM TRUNK SUBCUTANEOUS TISSUE AND FASCIA, PERCUTANEOUS APPROACH: ICD-10-PCS | Performed by: INTERNAL MEDICINE

## 2024-09-05 PROCEDURE — C1777 LEAD, AICD, ENDO SINGLE COIL: HCPCS | Performed by: INTERNAL MEDICINE

## 2024-09-05 PROCEDURE — 0JPT0PZ REMOVAL OF CARDIAC RHYTHM RELATED DEVICE FROM TRUNK SUBCUTANEOUS TISSUE AND FASCIA, OPEN APPROACH: ICD-10-PCS | Performed by: INTERNAL MEDICINE

## 2024-09-05 PROCEDURE — 93325 DOPPLER ECHO COLOR FLOW MAPG: CPT | Mod: 26 | Performed by: INTERNAL MEDICINE

## 2024-09-05 PROCEDURE — 88300 SURGICAL PATH GROSS: CPT | Mod: TC

## 2024-09-05 PROCEDURE — 33262 RMVL& REPLC PULSE GEN 1 LEAD: CPT | Performed by: INTERNAL MEDICINE

## 2024-09-05 PROCEDURE — 99152 MOD SED SAME PHYS/QHP 5/>YRS: CPT | Performed by: INTERNAL MEDICINE

## 2024-09-05 DEVICE — DEFIB ICD COBALT XT TITANIUM 64X51X13MM DVPA2D4: Type: IMPLANTABLE DEVICE | Status: FUNCTIONAL

## 2024-09-05 DEVICE — LEAD ACTIVE DF4 6935M-62CM: Type: IMPLANTABLE DEVICE | Status: FUNCTIONAL

## 2024-09-05 RX ORDER — IOPAMIDOL 755 MG/ML
INJECTION, SOLUTION INTRAVASCULAR
Status: DISCONTINUED | OUTPATIENT
Start: 2024-09-05 | End: 2024-09-05 | Stop reason: HOSPADM

## 2024-09-05 RX ORDER — WARFARIN SODIUM 2.5 MG/1
2.5 TABLET ORAL
Status: COMPLETED | OUTPATIENT
Start: 2024-09-05 | End: 2024-09-05

## 2024-09-05 RX ORDER — NALOXONE HYDROCHLORIDE 0.4 MG/ML
0.4 INJECTION, SOLUTION INTRAMUSCULAR; INTRAVENOUS; SUBCUTANEOUS
Status: DISCONTINUED | OUTPATIENT
Start: 2024-09-05 | End: 2024-09-06 | Stop reason: HOSPADM

## 2024-09-05 RX ORDER — OXYCODONE HYDROCHLORIDE 5 MG/1
5 TABLET ORAL EVERY 4 HOURS PRN
Status: DISCONTINUED | OUTPATIENT
Start: 2024-09-05 | End: 2024-09-05

## 2024-09-05 RX ORDER — ACETAMINOPHEN 325 MG/1
975 TABLET ORAL EVERY 8 HOURS
Status: DISCONTINUED | OUTPATIENT
Start: 2024-09-06 | End: 2024-09-06 | Stop reason: HOSPADM

## 2024-09-05 RX ORDER — LIDOCAINE 4 G/G
1 PATCH TOPICAL
Status: DISCONTINUED | OUTPATIENT
Start: 2024-09-06 | End: 2024-09-05

## 2024-09-05 RX ORDER — CEPHALEXIN 500 MG/1
500 CAPSULE ORAL 3 TIMES DAILY
Status: DISCONTINUED | OUTPATIENT
Start: 2024-09-05 | End: 2024-09-06 | Stop reason: HOSPADM

## 2024-09-05 RX ORDER — FENTANYL CITRATE 50 UG/ML
INJECTION, SOLUTION INTRAMUSCULAR; INTRAVENOUS
Status: DISCONTINUED | OUTPATIENT
Start: 2024-09-05 | End: 2024-09-05 | Stop reason: HOSPADM

## 2024-09-05 RX ORDER — LIDOCAINE 4 G/G
1 PATCH TOPICAL
Status: DISCONTINUED | OUTPATIENT
Start: 2024-09-05 | End: 2024-09-06 | Stop reason: HOSPADM

## 2024-09-05 RX ORDER — HYDROMORPHONE HYDROCHLORIDE 1 MG/ML
0.5 INJECTION, SOLUTION INTRAMUSCULAR; INTRAVENOUS; SUBCUTANEOUS
Status: DISCONTINUED | OUTPATIENT
Start: 2024-09-05 | End: 2024-09-06 | Stop reason: HOSPADM

## 2024-09-05 RX ORDER — OXYCODONE AND ACETAMINOPHEN 5; 325 MG/1; MG/1
1 TABLET ORAL
Status: COMPLETED | OUTPATIENT
Start: 2024-09-05 | End: 2024-09-05

## 2024-09-05 RX ORDER — OXYCODONE HYDROCHLORIDE 5 MG/1
5 TABLET ORAL
Status: DISCONTINUED | OUTPATIENT
Start: 2024-09-05 | End: 2024-09-06 | Stop reason: HOSPADM

## 2024-09-05 RX ORDER — NALOXONE HYDROCHLORIDE 0.4 MG/ML
0.2 INJECTION, SOLUTION INTRAMUSCULAR; INTRAVENOUS; SUBCUTANEOUS
Status: DISCONTINUED | OUTPATIENT
Start: 2024-09-05 | End: 2024-09-06 | Stop reason: HOSPADM

## 2024-09-05 RX ORDER — ACETAMINOPHEN 325 MG/1
650 TABLET ORAL EVERY 8 HOURS
Status: DISCONTINUED | OUTPATIENT
Start: 2024-09-05 | End: 2024-09-05

## 2024-09-05 RX ORDER — LIDOCAINE HYDROCHLORIDE 20 MG/ML
INJECTION, SOLUTION INFILTRATION; PERINEURAL
Status: DISCONTINUED | OUTPATIENT
Start: 2024-09-05 | End: 2024-09-05 | Stop reason: HOSPADM

## 2024-09-05 RX ADMIN — OXYCODONE HYDROCHLORIDE AND ACETAMINOPHEN 1 TABLET: 5; 325 TABLET ORAL at 06:07

## 2024-09-05 RX ADMIN — OXYCODONE HYDROCHLORIDE 5 MG: 5 TABLET ORAL at 10:44

## 2024-09-05 RX ADMIN — ACETAMINOPHEN 650 MG: 325 TABLET ORAL at 10:44

## 2024-09-05 RX ADMIN — CEPHALEXIN 500 MG: 500 CAPSULE ORAL at 15:35

## 2024-09-05 RX ADMIN — DIGOXIN 250 MCG: 125 TABLET ORAL at 08:46

## 2024-09-05 RX ADMIN — WARFARIN SODIUM 2.5 MG: 2.5 TABLET ORAL at 18:29

## 2024-09-05 RX ADMIN — AMIODARONE HYDROCHLORIDE 100 MG: 100 TABLET ORAL at 08:47

## 2024-09-05 RX ADMIN — PANTOPRAZOLE SODIUM 20 MG: 20 TABLET, DELAYED RELEASE ORAL at 08:46

## 2024-09-05 RX ADMIN — ACETAMINOPHEN 975 MG: 325 TABLET ORAL at 23:51

## 2024-09-05 RX ADMIN — OXYCODONE HYDROCHLORIDE 5 MG: 5 TABLET ORAL at 19:51

## 2024-09-05 RX ADMIN — CEPHALEXIN 500 MG: 500 CAPSULE ORAL at 19:51

## 2024-09-05 RX ADMIN — LISINOPRIL 5 MG: 5 TABLET ORAL at 08:46

## 2024-09-05 RX ADMIN — OXYCODONE HYDROCHLORIDE 5 MG: 5 TABLET ORAL at 23:50

## 2024-09-05 RX ADMIN — ACETAMINOPHEN 650 MG: 325 TABLET ORAL at 18:29

## 2024-09-05 RX ADMIN — OXYCODONE HYDROCHLORIDE 5 MG: 5 TABLET ORAL at 15:34

## 2024-09-05 RX ADMIN — SENNOSIDES AND DOCUSATE SODIUM 1 TABLET: 8.6; 5 TABLET ORAL at 22:49

## 2024-09-05 RX ADMIN — EMPAGLIFLOZIN 10 MG: 10 TABLET, FILM COATED ORAL at 08:47

## 2024-09-05 ASSESSMENT — ACTIVITIES OF DAILY LIVING (ADL)
ADLS_ACUITY_SCORE: 20

## 2024-09-05 NOTE — PROGRESS NOTES
[facility]  CARDIOLOGY HEART FAILURE SERVICE (CARDS II) PROGRESS NOTE    Patient Name: Akshat Fragoso    Medical Record Number: [id number]    YOB: 1965  PCP: Darian Estevez    Admit Date/Time: 9/3/2024 12:32 PM   2    Assessment and Plan:  Akshat Fragoso is a 59yoM with PMH of HFrEF 2/2 NICM (diagnosed 1/2017) s/p ICD placement 6/2017, VT/VF arrest in 3/2023 requiring 6 minutes of CPR with cardiogenic shock s/p HM3 LVAD implantation as destination therapy (3/23/2023) c/b postop Afib (on amio and dig), moderate TR s/p TV annuloplasty, PFO closure, chronic tobacco/marijuana use, COPD, HTN, and CKD 3 who presented from cardiology clinic after receiving inappropriate ICD shocks from his device x 4.     # RV lead dysfunction w oversensing (microperforation) with inappropriate ICD shocks  # ICD placement 2017 for NICM  # VT/VF arrest 3/2023  # Lead replacement 1/2024  - treatment per EP  - Plan for ICD lead replacement, date TBD     # Chronic systolic heart failure 2/2 NICM s/p HM3 as DT 3/2023  # ACC/AHA Stage D, NYHA Class III  # Moderate TR s/p TV annuloplasty with 32 mm MC3 partial ring 3/2023  No clinical signs or symptoms of acute heart failure. Appears euvolemic on exam. Continuing home regimen.   - Last TTE 12/27/2023: Borderline dilated LV with severely reduced global LV function, LVEF < 20%; RV function mildly reduced  - RHC 4/1/2024:   RA: --/--/15 mm Hg  RV: 60/15 mm Hg  PA: 60/30 (43) mm Hg  PCWP: --/--/24 mm Hg  CO/CI: 3.6/1.8 (TD); 3.3/1.7 (Carlos)   PVR: 5.2 (TD) LING  Right sided filling pressures are moderately elevated. Left sided filling pressures are moderately elevated. Moderately elevated pulmonary artery hypertension. Reduced cardiac output level.   - Volume status: euvolemic  - Diuresis: continue PTA bumex 1 mg every other day   - ACEi/ARB/ARNi: Entresto prescribed, but patient has not started yet due to lisinopril washout, will not start at this time given  patients's preference   - BB: deferred given RV dysfunction, continue with digoxin for RV dysfunction   - MRA: continue PTA eplerenone 12.5 mg daily  - SGLT2i: continue PTA empagliflozin 10 mg daily  - Device: See above regarding ICD  - Anticoagulation: continue PTA warfarin (goal INR 2-3, currently therapeutic)  - Antiplatelet: Not on ASA  - Renal function: near baseline  - VAD parameters:    #The patient's HeartMate 3 was interrogated:  - MAP 78 mmHg by Doppler (MAP goal 65-85 mmHg)  - Speed 5100 rpm   - Flow  4.3 L/minute   - Pulsatility index 3.1   - Power 3.6 Burt   - Fluid status: (-) euvolemic, (-) signs of LVF/RVF on exam   - The driveline exit site was inspected, c/d/i.   - All external components showed no evidence of damage or malfunction, none replaced.  - Alarms were reviewed, and notable for: none     #RV dysfunction  - c/w digoxin  - c/w diuretics   - agree with switching lisinopril to entresto with a washout when patient agrees     #h/io PAF   - Continue PTA amiodarone and digoxin  - Anticoagulation with warfarin as above    Thank you for allowing me to care for this patient, please don't hesitate to contact me with any questions regarding this plan.     Pt was discussed and evaluated with Jeff Sevilla MD, attending physician, who agrees with the assessment and plan above.    Rachid Montgomery MD, PhD, MSc  Cardiology Fellow      Interval Changes in Past 24 Hours:   - No acute overnight events. VSS.   - Complaining of ribcage pain at the site of ICD shock but reports it is controlled with current pain regiment.   - Pending lead extraction. Patient prefers Medtronic leads and device.    Review Of Systems  A 4-point ROS was negative aside from those listed above.    OBJECTIVE FINDINGS:    Temp:  [97.7  F (36.5  C)-98.2  F (36.8  C)] 97.7  F (36.5  C)  Pulse:  [56-78] 60  Resp:  [16-19] 19  SpO2:  [92 %-95 %] 95 %    Gen: Patient is awake and alert, NAD. Appears comfortable.    Neck: JVP  estimated at clavicle  Resp: clear to auscultation bilaterally, no crackles or wheezing   CV: LVAD hum  Abd: soft, NT, ND, no hepatosplenomegaly, normal BS  Ext: warm and well perfused, no LE edema    Intake/Output Summary (Last 24 hours) at 9/4/2024 1312  Last data filed at 9/4/2024 1159  Gross per 24 hour   Intake 240 ml   Output --   Net 240 ml     Wt Readings from Last 5 Encounters:   09/03/24 85.1 kg (187 lb 9.8 oz)   09/03/24 81.6 kg (180 lb)   07/24/24 86.8 kg (191 lb 4.8 oz)   04/10/24 88.6 kg (195 lb 6.4 oz)   04/10/24 85.7 kg (189 lb)       Current medications   Current Facility-Administered Medications   Medication Dose Route Frequency Provider Last Rate Last Admin    acetaminophen (TYLENOL) tablet 650 mg  650 mg Oral Q4H PRN Buck Trimble MD   650 mg at 09/03/24 1921    amiodarone (PACERONE) tablet 100 mg  100 mg Oral Daily Buck Trimble MD   100 mg at 09/04/24 1135    bumetanide (BUMEX) tablet 1 mg  1 mg Oral Every Other Day Buck Trimble MD        calcium carbonate (TUMS) chewable tablet 1,000 mg  1,000 mg Oral 4x Daily PRN Buck Trimble MD        digoxin (LANOXIN) tablet 250 mcg  250 mcg Oral Daily Buck Trimble MD   250 mcg at 09/04/24 0817    empagliflozin (JARDIANCE) tablet 10 mg  10 mg Oral Daily Buck Trimble MD   10 mg at 09/04/24 0817    eplerenone (INSPRA) half-tab 12.5 mg  12.5 mg Oral Daily Buck Trimble MD   12.5 mg at 09/04/24 1136    lidocaine (LMX4) cream   Topical Q1H PRN Buck Trimble MD        lidocaine 1 % 0.1-1 mL  0.1-1 mL Other Q1H PRN Buck Trimble MD        lisinopril (ZESTRIL) tablet 5 mg  5 mg Oral Daily Valeria Andrade MD   5 mg at 09/04/24 1420    naloxone (NARCAN) injection 0.2 mg  0.2 mg Intravenous Q2 Min PRN Jeff Aguilar MD        Or    naloxone (NARCAN) injection 0.4 mg  0.4 mg Intravenous Q2 Min PRN Jeff Aguilar MD        Or    naloxone (NARCAN) injection 0.2 mg  0.2 mg Intramuscular Q2 Min PRN Jeff Aguilar MD        Or    naloxone  (NARCAN) injection 0.4 mg  0.4 mg Intramuscular Q2 Min PRN Jeff Aguilar MD        pantoprazole (PROTONIX) EC tablet 20 mg  20 mg Oral QAM AC Buck Trimble MD   20 mg at 09/04/24 0817    Patient is already receiving anticoagulation with heparin, enoxaparin (LOVENOX), warfarin (COUMADIN)  or other anticoagulant medication   Does not apply Continuous PRN Buck Trimble MD        senna-docusate (SENOKOT-S/PERICOLACE) 8.6-50 MG per tablet 1 tablet  1 tablet Oral BID PRN Buck Trimble MD   1 tablet at 09/04/24 2005    Or    senna-docusate (SENOKOT-S/PERICOLACE) 8.6-50 MG per tablet 2 tablet  2 tablet Oral BID PRN Buck Trimble MD        sodium chloride (PF) 0.9% PF flush 3 mL  3 mL Intracatheter Q8H Buck Trimble MD   3 mL at 09/05/24 0524    sodium chloride (PF) 0.9% PF flush 3 mL  3 mL Intracatheter q1 min prn Buck Trimble MD        Warfarin Dose Required Daily - Pharmacist Managed  1 each Oral See Admin Instructions Buck Trimble MD           LABS Reviewed  IMAGES Reviewed

## 2024-09-05 NOTE — PRE-PROCEDURE
GENERAL PRE-PROCEDURE:   Procedure:  ICD removal and replacement  Date/Time:  9/5/2024 6:56 AM    Written consent obtained?: Yes    Risks and benefits: Risks, benefits and alternatives were discussed    Consent given by:  Patient  Patient states understanding of procedure being performed: Yes    Patient's understanding of procedure matches consent: Yes    Procedure consent matches procedure scheduled: Yes    Expected level of sedation:  Moderate  Appropriately NPO:  Yes  ASA Class:  3  Mallampati  :  Grade 2- soft palate, base of uvula, tonsillar pillars, and portion of posterior pharyngeal wall visible  Lungs:  Lungs clear with good breath sounds bilaterally  Heart:  Normal heart sounds and rate  History & Physical reviewed:  History and physical reviewed and no updates needed  Statement of review:  I have reviewed the lab findings, diagnostic data, medications, and the plan for sedation

## 2024-09-05 NOTE — DISCHARGE INSTRUCTIONS
Home Care after an ICD implant    Wound care:  Keep your incision (surgery wound) dry for 3 days.  After 3 days, you may remove the outer bandage.  Keep the strips of tape on.  They will be removed at your clinic visit.  Check for signs of infection each day.  These include increased redness, swelling, drainage or a fever over 101 F (38.3 C).  Call us immediately if you see any of these signs.  If there are no signs of infection, you may shower in 3 days.  Do not submerge the incision (in a bath tub, hot tub, or swimming pool) until fully healed.  Pain:   You may have mild to moderate pain for 3 to 5 days.  Take acetaminophen (Tylenol) or ibuprofen (Advil) for the pain.  Call us if the pain is severe or lasts more than 5 days.    Activity:  You should slowly go back to your normal activities after 24 hours.  Healing will take 4 to 6 weeks.  No driving for 3 days  Avoid climbing a ladder alone.  It is best to stay within 4 feet of the ground.  Avoid anything that may cause rough contact or a hard hit to your chest.  This includes football, hockey, and other contact sports.  Do not go swimming or boating alone.      For at least 4 weeks:  Do not raise your affected arm above your shoulder.  Do not use your affected arm to push, pull, or lift anything over 10 pounds.  Avoid repetitive upper body activities for 6 weeks (ie: golf, swimming, and weight lifting)    Telling others about your device:  Before you have any medical tests or treatments, tell the doctors, dentists, and other care providers about your device.  There are a few tests and treatments that may interfere with your device.  (These include MRI, radiation therapy, electrocautery, and others.)  Your care team may need to take special steps to keep you safe.  Before you leave the hospital, you will receive a temporary ID card.  A permanent card will be mailed to you about 6 to 8 weeks later.  Always carry the ID card with you.  It has important details  about your device.  You should also get a MedicAlert ID.  Please ask us for a MedicAlert brochure, or go to [email or web address].    Safety near electrical equipment:  All of these are safe to use when in good repair:  Microwaves  Radios  Cordless phone  Remote controls  Small electrical tools  Cell phones: Keep cell phones at least 6 inches from your device.  Do not carry the phone in a pocket near your device.  Security reinoso: It is okay to walk through security reinoso at the airports and department stores.  Tell airport security that you have a defibrillator.  They should keep the screening wand at least 6 inches from your device.  Full-body scanners are safe.    Avoid the following:   MRI tests in the hospital unless you have a MRI safe defibrillator.   Arc welding, chain saws and high-powered industrial or commercial tools.   Power lines, power plants and large power generators.   Electric body fat scales.   Magnetic mattress pads or pillow.    What to do after a shock from your ICD:  Stop what you re doing and rest.  If you feel fine before and after the shock, call the device clinic.  If you feel unwell or receive more than one shock, call 911 or go to the emergency room.  A shock could mean that your condition has changed and you may need to see a doctor.    Follow-Up Visits:  Return to the clinic in 7 to 10 days to have your device and wound checked.    Device follow-up after your initial clinic visit will take place every 3 months and as needed until your battery reaches end of service. The device follow up will take place in person or remotely utilizing your home monitor.    Questions?  Please call [facility]   Device Nurse:          Business Hours:  [phone]    After Hours:  [phone]   Choose option 4, then ask for the on-call device nurse at job code [id number].    Your next device clinic appointment is scheduled on:    Thursday, September 12th at 9:00 am.                                                  [facility]  [facility] [address]  [address] [place]  [address]

## 2024-09-05 NOTE — PLAN OF CARE
"Goal Outcome Evaluation:  Shift: 1900-0730    /78   Pulse 60   Temp 98.1  F (36.7  C) (Oral)   Resp 16   Ht 1.702 m (5' 7\")   Wt 85.1 kg (187 lb 9.8 oz)   SpO2 94%   BMI 29.38 kg/m       Plan of Care Reviewed With: patient, spouse  Overall Patient Progress: no change    Goal Outcome Evaluation:     Neuro: A&Ox4.   Cardiac: SR/SA.  VSS.  Pt HM 3 LVAD.         Respiratory: Sating 94%> on RA.  GI/: Adequate urine output into tiolet.  No BM this shift, pt c/o constipation, PRN senna-docusate given x 1  Diet/appetite: NPO for ICD replacement today  Activity:  Indepent, up to chair and in halls.  Pain: moderate-severe R ribcage pain managed with PRN percocet  Skin: No new deficits noted.  LDA's:HM 3, PIV     Plan: Continue with POC. Notify primary team with changes.              "

## 2024-09-05 NOTE — PLAN OF CARE
Goal Outcome Evaluation:    Neuro: A&Ox4.   Cardiac: SR/SA.  VSS.  Pt HM 3 LVAD.   Respiratory: Sating 94%> on RA.  GI/: Adequate urine output into tiolet.  No BM this shift  Diet/appetite: Tolerating regular diet. Eating well.  Activity:  Indepent, up to chair and in halls.  Pain: At acceptable level on current regimen.   Skin: No new deficits noted.  LDA's:HM 3, PIV    Plan: Continue with POC. Notify primary team with changes.

## 2024-09-06 ENCOUNTER — TELEPHONE (OUTPATIENT)
Dept: CARDIOLOGY | Facility: CLINIC | Age: 59
End: 2024-09-06
Payer: COMMERCIAL

## 2024-09-06 ENCOUNTER — TELEPHONE (OUTPATIENT)
Dept: INTERNAL MEDICINE | Facility: CLINIC | Age: 59
End: 2024-09-06
Payer: COMMERCIAL

## 2024-09-06 ENCOUNTER — TELEPHONE (OUTPATIENT)
Dept: ANTICOAGULATION | Facility: CLINIC | Age: 59
End: 2024-09-06

## 2024-09-06 ENCOUNTER — CARE COORDINATION (OUTPATIENT)
Dept: CARDIOLOGY | Facility: CLINIC | Age: 59
End: 2024-09-06
Payer: COMMERCIAL

## 2024-09-06 ENCOUNTER — APPOINTMENT (OUTPATIENT)
Dept: GENERAL RADIOLOGY | Facility: CLINIC | Age: 59
DRG: 277 | End: 2024-09-06
Payer: COMMERCIAL

## 2024-09-06 ENCOUNTER — ANCILLARY PROCEDURE (OUTPATIENT)
Dept: CARDIOLOGY | Facility: CLINIC | Age: 59
DRG: 277 | End: 2024-09-06
Attending: NURSE PRACTITIONER
Payer: COMMERCIAL

## 2024-09-06 VITALS
WEIGHT: 187.61 LBS | HEART RATE: 62 BPM | TEMPERATURE: 98 F | SYSTOLIC BLOOD PRESSURE: 147 MMHG | OXYGEN SATURATION: 96 % | BODY MASS INDEX: 29.45 KG/M2 | HEIGHT: 67 IN | DIASTOLIC BLOOD PRESSURE: 128 MMHG | RESPIRATION RATE: 14 BRPM

## 2024-09-06 DIAGNOSIS — Z79.01 ANTICOAGULATED ON COUMADIN: ICD-10-CM

## 2024-09-06 DIAGNOSIS — I50.22 CHRONIC SYSTOLIC CONGESTIVE HEART FAILURE (H): ICD-10-CM

## 2024-09-06 DIAGNOSIS — Z79.899 LONG TERM USE OF DRUG: ICD-10-CM

## 2024-09-06 DIAGNOSIS — I50.23 ACUTE ON CHRONIC SYSTOLIC CONGESTIVE HEART FAILURE (H): Primary | ICD-10-CM

## 2024-09-06 DIAGNOSIS — Z95.811 LVAD (LEFT VENTRICULAR ASSIST DEVICE) PRESENT (H): ICD-10-CM

## 2024-09-06 DIAGNOSIS — Z95.811 LEFT VENTRICULAR ASSIST DEVICE PRESENT (H): ICD-10-CM

## 2024-09-06 LAB
ALBUMIN SERPL BCG-MCNC: 3.8 G/DL (ref 3.5–5.2)
ALP SERPL-CCNC: 67 U/L (ref 40–150)
ALT SERPL W P-5'-P-CCNC: 27 U/L (ref 0–70)
ANION GAP SERPL CALCULATED.3IONS-SCNC: 8 MMOL/L (ref 7–15)
AST SERPL W P-5'-P-CCNC: 39 U/L (ref 0–45)
BILIRUB DIRECT SERPL-MCNC: <0.2 MG/DL (ref 0–0.3)
BILIRUB SERPL-MCNC: 0.3 MG/DL
BUN SERPL-MCNC: 14 MG/DL (ref 6–20)
CALCIUM SERPL-MCNC: 8.6 MG/DL (ref 8.8–10.4)
CHLORIDE SERPL-SCNC: 103 MMOL/L (ref 98–107)
CREAT SERPL-MCNC: 1.17 MG/DL (ref 0.67–1.17)
EGFRCR SERPLBLD CKD-EPI 2021: 73 ML/MIN/1.73M2
ERYTHROCYTE [DISTWIDTH] IN BLOOD BY AUTOMATED COUNT: 17.9 % (ref 10–15)
GLUCOSE SERPL-MCNC: 85 MG/DL (ref 70–99)
HCO3 SERPL-SCNC: 25 MMOL/L (ref 22–29)
HCT VFR BLD AUTO: 44.4 % (ref 40–53)
HGB BLD-MCNC: 13.4 G/DL (ref 13.3–17.7)
INR PPP: 2.54 (ref 0.85–1.15)
LDH SERPL L TO P-CCNC: 251 U/L (ref 0–250)
MAGNESIUM SERPL-MCNC: 2.2 MG/DL (ref 1.7–2.3)
MCH RBC QN AUTO: 26.9 PG (ref 26.5–33)
MCHC RBC AUTO-ENTMCNC: 30.2 G/DL (ref 31.5–36.5)
MCV RBC AUTO: 89 FL (ref 78–100)
PLATELET # BLD AUTO: 204 10E3/UL (ref 150–450)
POTASSIUM SERPL-SCNC: 4.4 MMOL/L (ref 3.4–5.3)
PROT SERPL-MCNC: 6.2 G/DL (ref 6.4–8.3)
RBC # BLD AUTO: 4.99 10E6/UL (ref 4.4–5.9)
SODIUM SERPL-SCNC: 136 MMOL/L (ref 135–145)
WBC # BLD AUTO: 11.8 10E3/UL (ref 4–11)

## 2024-09-06 PROCEDURE — 85027 COMPLETE CBC AUTOMATED: CPT

## 2024-09-06 PROCEDURE — 83735 ASSAY OF MAGNESIUM: CPT

## 2024-09-06 PROCEDURE — 80053 COMPREHEN METABOLIC PANEL: CPT | Performed by: STUDENT IN AN ORGANIZED HEALTH CARE EDUCATION/TRAINING PROGRAM

## 2024-09-06 PROCEDURE — 99207 PR NO CHARGE LOS: CPT

## 2024-09-06 PROCEDURE — 250N000013 HC RX MED GY IP 250 OP 250 PS 637

## 2024-09-06 PROCEDURE — 999N000065 XR CHEST 2 VIEWS

## 2024-09-06 PROCEDURE — 999N000054 HC STATISTIC EKG NON-CHARGEABLE

## 2024-09-06 PROCEDURE — 85610 PROTHROMBIN TIME: CPT

## 2024-09-06 PROCEDURE — 93282 PRGRMG EVAL IMPLANTABLE DFB: CPT | Mod: 26 | Performed by: INTERNAL MEDICINE

## 2024-09-06 PROCEDURE — 83615 LACTATE (LD) (LDH) ENZYME: CPT

## 2024-09-06 PROCEDURE — 250N000013 HC RX MED GY IP 250 OP 250 PS 637: Performed by: STUDENT IN AN ORGANIZED HEALTH CARE EDUCATION/TRAINING PROGRAM

## 2024-09-06 PROCEDURE — 71046 X-RAY EXAM CHEST 2 VIEWS: CPT | Mod: 26 | Performed by: RADIOLOGY

## 2024-09-06 PROCEDURE — 99238 HOSP IP/OBS DSCHRG MGMT 30/<: CPT | Mod: GC | Performed by: INTERNAL MEDICINE

## 2024-09-06 PROCEDURE — 36415 COLL VENOUS BLD VENIPUNCTURE: CPT

## 2024-09-06 PROCEDURE — 250N000013 HC RX MED GY IP 250 OP 250 PS 637: Performed by: NURSE PRACTITIONER

## 2024-09-06 PROCEDURE — 250N000011 HC RX IP 250 OP 636

## 2024-09-06 PROCEDURE — 93282 PRGRMG EVAL IMPLANTABLE DFB: CPT

## 2024-09-06 RX ORDER — ATROPINE SULFATE 0.1 MG/ML
0.5 INJECTION INTRAVENOUS EVERY 5 MIN PRN
Status: DISCONTINUED | OUTPATIENT
Start: 2024-09-06 | End: 2024-09-06 | Stop reason: HOSPADM

## 2024-09-06 RX ORDER — FENTANYL CITRATE 50 UG/ML
25-50 INJECTION, SOLUTION INTRAMUSCULAR; INTRAVENOUS
Status: DISCONTINUED | OUTPATIENT
Start: 2024-09-06 | End: 2024-09-06 | Stop reason: HOSPADM

## 2024-09-06 RX ORDER — SACUBITRIL AND VALSARTAN 24; 26 MG/1; MG/1
0.5 TABLET, FILM COATED ORAL 2 TIMES DAILY
Qty: 45 TABLET | Refills: 0 | OUTPATIENT
Start: 2024-09-06

## 2024-09-06 RX ORDER — OXYCODONE AND ACETAMINOPHEN 5; 325 MG/1; MG/1
1 TABLET ORAL EVERY 6 HOURS PRN
Qty: 5 TABLET | Refills: 0 | Status: SHIPPED | OUTPATIENT
Start: 2024-09-06

## 2024-09-06 RX ORDER — OXYCODONE AND ACETAMINOPHEN 5; 325 MG/1; MG/1
1 TABLET ORAL EVERY 4 HOURS PRN
Status: DISCONTINUED | OUTPATIENT
Start: 2024-09-06 | End: 2024-09-06 | Stop reason: ALTCHOICE

## 2024-09-06 RX ORDER — WARFARIN SODIUM 5 MG/1
5 TABLET ORAL
Status: DISCONTINUED | OUTPATIENT
Start: 2024-09-06 | End: 2024-09-06 | Stop reason: HOSPADM

## 2024-09-06 RX ORDER — WARFARIN SODIUM 5 MG/1
2.5-5 TABLET ORAL DAILY
Qty: 70 TABLET | Refills: 1 | Status: SHIPPED | OUTPATIENT
Start: 2024-09-06

## 2024-09-06 RX ORDER — FLUMAZENIL 0.1 MG/ML
0.2 INJECTION, SOLUTION INTRAVENOUS
Status: DISCONTINUED | OUTPATIENT
Start: 2024-09-06 | End: 2024-09-06 | Stop reason: HOSPADM

## 2024-09-06 RX ORDER — CEPHALEXIN 500 MG/1
500 CAPSULE ORAL 3 TIMES DAILY
Qty: 12 CAPSULE | Refills: 0 | Status: SHIPPED | OUTPATIENT
Start: 2024-09-06 | End: 2024-09-10

## 2024-09-06 RX ADMIN — Medication 12.5 MG: at 09:58

## 2024-09-06 RX ADMIN — OXYCODONE HYDROCHLORIDE 5 MG: 5 TABLET ORAL at 03:12

## 2024-09-06 RX ADMIN — ACETAMINOPHEN 975 MG: 325 TABLET ORAL at 10:07

## 2024-09-06 RX ADMIN — CEPHALEXIN 500 MG: 500 CAPSULE ORAL at 09:56

## 2024-09-06 RX ADMIN — EMPAGLIFLOZIN 10 MG: 10 TABLET, FILM COATED ORAL at 09:59

## 2024-09-06 RX ADMIN — DIGOXIN 250 MCG: 125 TABLET ORAL at 09:57

## 2024-09-06 RX ADMIN — LISINOPRIL 5 MG: 5 TABLET ORAL at 09:57

## 2024-09-06 RX ADMIN — AMIODARONE HYDROCHLORIDE 100 MG: 100 TABLET ORAL at 09:56

## 2024-09-06 RX ADMIN — OXYCODONE HYDROCHLORIDE 5 MG: 5 TABLET ORAL at 10:08

## 2024-09-06 RX ADMIN — HYDROMORPHONE HYDROCHLORIDE 0.5 MG: 1 INJECTION, SOLUTION INTRAMUSCULAR; INTRAVENOUS; SUBCUTANEOUS at 02:38

## 2024-09-06 RX ADMIN — PANTOPRAZOLE SODIUM 20 MG: 20 TABLET, DELAYED RELEASE ORAL at 09:57

## 2024-09-06 RX ADMIN — OXYCODONE HYDROCHLORIDE 5 MG: 5 TABLET ORAL at 06:22

## 2024-09-06 ASSESSMENT — ACTIVITIES OF DAILY LIVING (ADL)
ADLS_ACUITY_SCORE: 20

## 2024-09-06 NOTE — PROVIDER NOTIFICATION
Provider paged re:  Pt is having 10/10 pain, can't have oxy again until 2350. He was wondering if the pain meds can be every 3 hours tonight because his pain is worse now   Thanks

## 2024-09-06 NOTE — PROGRESS NOTES
D:  Called pt to check in post hospitalization.  Pt reports he is currently in the discharge pharmacy waiting for his discharge prescriptions.  Pt feeling tired and ready to get home.  I:  Discussed follow up requested (asap appt and one 3 months there after) and medications.  Pt reports discomfort at ICD surgical site.  Discussed possibility of starting a transplant workup, as discussed at appt with Dr. Aguiar last week and the need to start substance abuse surveillance testing. Pt informed me he will have THC in his system and requests a later start date.  Pt to inform writer when he is ready to start surveillance testing.  Pt agreeable.  Will inform pre transplant team and Dr. Aguiar.    A:  Post hospital check in  P:  Pt verbalized understanding of the instructions given.  Will call VAD coordinator with further needs and questions.

## 2024-09-06 NOTE — TELEPHONE ENCOUNTER
ANTICOAGULATION MANAGEMENT:  Medication Refill    Anticoagulation Summary  As of 9/3/2024      Warfarin maintenance plan:  5 mg (5 mg x 1) every Mon, Wed, Fri; 2.5 mg (5 mg x 0.5) all other days   Next INR check:  9/11/2024   Target end date:  Indefinite    Indications    Acute on chronic systolic congestive heart failure (H) [I50.23]  LVAD (left ventricular assist device) present (H) [Z95.811]  Chronic systolic congestive heart failure (H) [I50.22]  Long term use of drug [Z79.899]  Left ventricular assist device present (H) [Z95.811]  Anticoagulated on Coumadin [Z79.01]                 Anticoagulation Care Providers       Provider Role Specialty Phone number    Kenzie Moreau MD Referring Advanced Heart Failure and Transplant Cardiology [phone]    Mile Bolivar APRN CNP Referring Nurse Practitioner [phone]    Shaun Aguiar MD Referring Cardiovascular Disease [phone]            Refill Criteria    Visit with referring provider/group: Meets criteria: visit within referring provider group in the last 15 months on 9/3/24    [facility] referral last signed: 06/10/2024; within last year: Yes    Lab monitoring not exceeding 2 weeks overdue: Yes    Akshat meets all criteria for refill. Rx instructions and quantity match patient's current dosing plan. Warfarin 90 day supply with 1 refill granted per [facility] protocol     Yessenia Mcfarland RN  Anticoagulation Clinic

## 2024-09-06 NOTE — TELEPHONE ENCOUNTER
9/6 talked with pt, pt said he was walking in the door after just being discharged and would like a call back Monday to schedule follow-up with the VAD team [facility]

## 2024-09-06 NOTE — PROGRESS NOTES
Electrophysiology Follow Up Note  Date of Procedure: 9/5/24  DOS: 9/6/2024   Pre-procedure Diagnosis:  Inappropriate ICD shock x 4 due to lead insulation breach   Post-procedure Diagnosis: Successful implantation of ICD, for secondary prevention     Hospital Course:  Mr. Fragoso is a 59 year old medical history significant for NICM LVEF 12%, VT/VF arrest on 3/15/2023, s/p DT LVAD 3/23/23, moderate TV annuloplasty  with 32 mm MC3 partial ring and PFO closure 3/23/2023, s/p ICD 6/8/17, HTN, COPD, CKD III, and chronic tobacco/marijuana use.   In January 2017, he was admitted with worsening JOHNSON and SOB and was found to have NICM. He had reported a 5 year history of JOHNSON that worsened over the last year. Coronary angiogram showed clean coronaries, and RHC with normal CI, and mildly elevated right and left heart filling pressures. He was started on optimal medical therapy. His JOHNSON/SOB improved with medications. Repeat CMRI shows an LVEF of 12% and RVEF of 30%. He had ICD implanted on 6/8/17. He suffered a VT/VF arrest on 3/15/2023 requiring CPR for 6 mins with cardiogenic shock. Ultimately had HM3 LVAD implantation as DT with concomitant TV annuloplasty and PFO closure on 3/23/2023 c/b postop AF (on amiodarone and digoxin) and HAP. He has now had recent decrease in pacing impedance after LVAD implant with substantial decrease in sensing and gradual increase in pacing threshold. Post LVAD his ICD lead had with substantial decrease in sensing and gradual increase in pacing threshold. Thus, he had ICD extraction and reimplant of new ICD lead on 1/2024. He did well at follow up and device check was stable. We were notified 9/3/2024 that patient had RV lead integrity alert. Device interrogation in clinic today showed R waves have decreased from 10.7 mV to 4.8 mV, 2 monitor VT and 10 NSVT episodes lasting 10 seconds with EGMs show oversensing on RV lead channel. Tech services evaluated and reported P-wave oversensing. CXR shows  "stable lead placement. Patient was returning home from a scheduled clinic visit today and got 4 ICD shocks. Thus, presented to [facility]. Device interrogation here shows he got inappropriate ATP for RV lead oversensing. This triggered arrhyhtmia and 3 shocks were deployed with final shock terminating it. Patient had no LVAD alarms and no symptoms preceding events. We discussed concern for lead insulation breach and need for new ICD lead. He was agreeable to proceeding.   Patient underwent a successful ICD removal and reimplant of medtronic sytem yesterday. He had an uneventful overnight stay. Telemetry reviewed showing sinus rhythm. Device interrogation shows normal device function and stable lead parameters. Chest x-ray demonstrates no evidence of pneumothorax. Left subclavian site is CDI, no bleeding, and no hematoma. Patient is tolerating oral intake, ambulating at baseline, and voiding without difficulties. Patient remains hemodynamically stable.     ROS:   10 point ROS neg other than the symptoms noted above.   Exam:  BP (!) 147/128   Pulse 62   Temp 98  F (36.7  C) (Oral)   Resp 14   Ht 1.702 m (5' 7\")   Wt 85.1 kg (187 lb 9.8 oz)   SpO2 96%   BMI 29.38 kg/m      GENERAL APPEARANCE: AxO, NAD   HEENT: NCAT, MMM.   CHEST: CTAB   CARDIOVASCULAR: S1S2, Reg, No m/r/g.   ABDOMEN: soft, nontender, nondistended   EXTREMITIES: No pedal edema. Distal pulses intact.   NEURO: Grossly nonfocal.   PSYCH: Normal affect.  SKIN: Warm and dry. Incision CDI.     Discharge Medications:     Review of your medicines        UNREVIEWED medicines. Ask your doctor about these medicines        Dose / Directions   acetaminophen 325 MG tablet  Commonly known as: TYLENOL  Used for: LVAD (left ventricular assist device) present (H)      Dose: 650 mg  Take 2 tablets (650 mg) by mouth every 4 hours as needed for other (For optimal non-opioid multimodal pain management to improve pain control.)  Quantity: 100 tablet  Refills: 0     amiodarone " 200 MG tablet  Commonly known as: PACERONE  Used for: LVAD (left ventricular assist device) present (H)      Dose: 100 mg  Take 0.5 tablets (100 mg) by mouth daily.  Quantity: 45 tablet  Refills: 0     amoxicillin 500 MG tablet  Commonly known as: AMOXIL  Used for: Chronic systolic congestive heart failure (H), LVAD (left ventricular assist device) present (H)      Dose: 2,000 mg  Take 4 tablets (2,000 mg) by mouth as needed (Take one hour before dental procedure.)  Quantity: 4 tablet  Refills: 2     bumetanide 1 MG tablet  Commonly known as: BUMEX  Used for: LVAD (left ventricular assist device) present (H)      Dose: 1 mg  Take 1 tablet (1 mg) by mouth every other day  Quantity: 45 tablet  Refills: 3     digoxin 250 MCG tablet  Commonly known as: LANOXIN  Used for: LVAD (left ventricular assist device) present (H)      Dose: 250 mcg  Take 1 tablet (250 mcg) by mouth daily  Quantity: 30 tablet  Refills: 11     empagliflozin 10 MG Tabs tablet  Commonly known as: JARDIANCE  Used for: LVAD (left ventricular assist device) present (H)      Dose: 10 mg  Take 1 tablet (10 mg) by mouth daily  Quantity: 90 tablet  Refills: 3     Entresto 24-26 MG per tablet  Used for: Left ventricular assist device present (H), Chronic systolic congestive heart failure (H)  Generic drug: sacubitril-valsartan      Dose: 0.5 tablet  Take 0.5 tablets by mouth 2 times daily Take 12-13mg (half tab) twice per day  Quantity: 45 tablet  Refills: 0     eplerenone 25 MG tablet  Commonly known as: Inspra  Used for: Left ventricular assist device present (H), Chronic systolic congestive heart failure (H)      Take 12.5mg (1/2 tab) daily  Quantity: 45 tablet  Refills: 3     JANTOVEN ANTICOAGULANT 5 MG tablet  Generic drug: warfarin ANTICOAGULANT      Dose: 2.5-5 mg  Take as directed. If you are unsure how to take this medication, talk to your nurse or doctor.  Original instructions: Take 2.5-5 mg by mouth daily. Take 5 mg on Mondays, Wednesdays, and  Fridays and 2.5 mg all other days of the week or as directed by your anticoagulation clinic.  Refills: 0     lisinopril 5 MG tablet  Commonly known as: ZESTRIL      Dose: 5 mg  Take 5 mg by mouth daily.  Refills: 0     oxyCODONE-acetaminophen 5-325 MG tablet  Commonly known as: PERCOCET      Dose: 1 tablet  Take 1 tablet by mouth every 6 hours as needed for severe pain.  Refills: 0     pantoprazole 20 MG EC tablet  Commonly known as: PROTONIX  Used for: LVAD (left ventricular assist device) present (H)      Dose: 20 mg  Take 1 tablet (20 mg) by mouth every morning (before breakfast)  Quantity: 90 tablet  Refills: 3     sildenafil 50 MG tablet  Commonly known as: VIAGRA  Used for: Erectile dysfunction, unspecified erectile dysfunction type      Dose: 50 mg  Take 1 tablet (50 mg) by mouth daily as needed (PRN for sexual activity)  Quantity: 30 tablet  Refills: 0            Plan & Follow up:    Follow up with device clinic in 7-10 days  Oral antibiotics x 5 days  EP follow up in 3 months, we will arrange   Keep incision clean and dry for 72 hours post procedure  No lifting > 10lbs or over shoulder level with left arm for 4 weeks  Notify device clinic/EP immediately for any redness, swelling, bleeding, discharge, fevers or chills.    Kay Shearer PA-C  Electrophysiology Consult Service  Pager: [phone]

## 2024-09-06 NOTE — PLAN OF CARE
"Goal Outcome Evaluation:  Shift: 1900-0730    BP (!) 147/128   Pulse 60   Temp 97  F (36.1  C) (Oral)   Resp 16   Ht 1.702 m (5' 7\")   Wt 85.1 kg (187 lb 9.8 oz)   SpO2 95%   BMI 29.38 kg/m       Goal Outcome Evaluation:     Neuro: A&Ox4.   Cardiac: SR/SA.  VSS.  Pt HM 3 LVAD.         Respiratory: Sating 94%> on RA.  GI/: Adequate urine output into tiolet.  No BM this shift, pt c/o constipation, PRN senna-docusate given x 1  Diet/appetite: NPO for ICD replacement today  Activity:  Indepent, up to chair and in halls.  Pain: moderate-severe R ribcage pain managed with PRN percocet  Skin: No new deficits noted.  LDA's:HM 3, PIV     Plan: Tentative plan to discharge today. Notify primary team with changes.           "

## 2024-09-06 NOTE — DISCHARGE SUMMARY
[facility]  Cardiology Heart Failure Service (Cards 2)  Discharge Summary       Patient Name: Akshat Fragoso    Medical Record Number: [id number]    YOB: 1965  Date of admission:  9/3/2024  Date of discharge: 9/6/2024    Admitting provider: Jeff Aguilar MD  Discharge provider: Jeff Aguilar  Primary provider: Darian Estevez    DISCHARGE DIAGNOSES:   Inappropriate ICD shock   End stage heart failure s/p LVAD  COPD  HTN  CKD 3    ITEMS FOR OUTPATIENT FOLLOW UP:   - Once pt agrees then we should switch his ACEi to entresto per Dr. Aguiar's outpatient plan.   - Follow up with EP for device interrogation  - Follow up with Dr. Aguiar for his LVAD      HPI: Please see the detailed H & P from 9/3/2024. Briefly, Akshat Fragoso is a 59yoM with PMH of HFrEF 2/2 NICM (diagnosed 1/2017) s/p ICD placement 6/2017, VT/VF arrest in 3/2023 requiring 6 minutes of CPR with cardiogenic shock s/p HM3 LVAD implantation as destination therapy (3/23/2023) c/b postop Afib (on amio and dig), moderate TR s/p TV annuloplasty, PFO closure, chronic tobacco/marijuana use, COPD, HTN, and CKD 3 who presented from cardiology clinic after receiving inappropriate ICD shocks from his device x 4. Underwent lead and battery exchange on 9/5 without chaparro procedural  complications.     HOSPITAL COURSE BY PROBLEM:     # RV lead dysfunction w oversensing (microperforation) with inappropriate ICD shocks  # ICD placement 2017 for NICM  # VT/VF arrest 3/2023  # Lead replacement 1/2024  - treatment per EP  - s/p lead and battery exchange on 9/5     # Chronic systolic heart failure 2/2 NICM s/p HM3 as DT 3/2023  # ACC/AHA Stage D, NYHA Class III  # Moderate TR s/p TV annuloplasty with 32 mm MC3 partial ring 3/2023  No clinical signs or symptoms of acute heart failure. Appears euvolemic on exam. Continuing home regimen.   - Last TTE 12/27/2023: Borderline dilated LV with severely reduced global LV function, LVEF <  "20%; RV function mildly reduced  - [facility] 4/1/2024:   RA: --/--/15 mm Hg  RV: 60/15 mm Hg  PA: 60/30 (43) mm Hg  PCWP: --/--/24 mm Hg  CO/CI: 3.6/1.8 (TD); 3.3/1.7 (Carlos)   PVR: 5.2 (TD) LING  Right sided filling pressures are moderately elevated. Left sided filling pressures are moderately elevated. Moderately elevated pulmonary artery hypertension. Reduced cardiac output level.   - Volume status: euvolemic  - Diuresis: continue PTA bumex 1 mg every other day   - ACEi/ARB/ARNi: Entresto prescribed, but patient has not started yet due to lisinopril washout, will not start at this time given patients's preference   - BB: deferred given RV dysfunction, continue with digoxin for RV dysfunction   - MRA: continue PTA eplerenone 12.5 mg daily  - SGLT2i: continue PTA empagliflozin 10 mg daily  - Device: See above regarding ICD  - Anticoagulation: continue PTA warfarin (goal INR 2-3, currently therapeutic)  - Antiplatelet: Not on ASA  - Renal function: near baseline  - VAD parameters:     #RV dysfunction  - c/w digoxin  - c/w diuretics   - agree with switching lisinopril to entresto with a washout when patient agrees      #h/io PAF   - Continue PTA amiodarone and digoxin  - Anticoagulation with warfarin as above    PHYSICAL EXAM:  Blood pressure (!) 147/128, pulse 62, temperature 98  F (36.7  C), temperature source Oral, resp. rate 14, height 1.702 m (5' 7\"), weight 85.1 kg (187 lb 9.8 oz), SpO2 96%.    General appearance - Lying in bed; appears in no acute distress.  Head: Normocephalic and atraumatic.   Eyes: Pupils are equal, round, and reactive to light. Extraocular movement intact. No conjunctival pallor. No scleral icterus.  Neck: No JVD, bruit.  Cardiovascular: Regular rhythm. S1 and S2 auscultated. No murmurs, rub, or gallops.  Pulmonary/Chest: Normal resp effort on RA, speaking in full sentences. Clear breath sounds to auscultation bilaterally. No wheezes, crackles, or rhonchi. No chest tenderness.   Abdominal: Bowel " sounds present. Soft. No distension. No rigidity, rebound or guarding. No organomegaly, hernias or palpable masses on deep palpation. No CVA tenderness.  Extremities: Warm, no cyanosis, 2+ distal pulses bilaterally. No edema.   Skin: Skin is warm and not diaphoretic. No rash, bruising, or erythema.  Neuro: Alert and oriented to person, place, and time. No focal neurological deficit.    #The patient's HeartMate 3 LVAD  was interrogated:  - MAP 70 mmHg by Doppler (MAP goal 65-85 mmHg)  - Speed 5100 rpm   - Flow  4.4 L/minute   - Pulsatility index 3.5   - Power 3.6 Burt   - Fluid status: (-) euvolemic, (-) signs of LVF/RVF on exam   - The driveline exit site was inspected, c/d/i.   - All external components showed no evidence of damage or malfunction, none replaced.  - Alarms were reviewed, and notable for: no alarms     LABS:   Last CBC:   Recent Labs   Lab Test 09/06/24  0432   WBC 11.8*   RBC 4.99   HGB 13.4   HCT 44.4   MCV 89   MCH 26.9   MCHC 30.2*   RDW 17.9*          Last CMP:  Recent Labs   Lab Test 09/06/24  0432      POTASSIUM 4.4   CHLORIDE 103   TONY 8.6*   CO2 25   BUN 14.0   CR 1.17   GLC 85   AST 39   ALT 27   BILITOTAL 0.3   ALBUMIN 3.8   PROTTOTAL 6.2*   ALKPHOS 67     IMAGING:  Results for orders placed or performed during the hospital encounter of 09/03/24   EP Device    Narrative    Images from the original result were not included.  EP PROCEDURE NOTE    Procedures:  1. Single lead ICD device implantation.  2. Left subclavian venogram  3. Single lead ICD device and lead removal     Attending: Dr. Montgomery  EP Fellow: Jorge Reyes Castro, MD, MS; Joel hoover   Procedure Date: 9/5/2024    Pre-operative Diagnosis:  inappropriate ICD shock x 4 due to lead   insulation breach  Post-operative diagnosis:   Successful implantation of ICD, for  secondary   prevention   Complications: None.     Fluoroscopy time/dose: please see procedure log       Clinical Profile:  Mr. Fragoso is a 59 year old  medical history significant for NICM LVEF 12%,   VT/VF arrest on 3/15/2023, s/p DT LVAD 3/23/23, moderate TV annuloplasty    with 32 mm MC3 partial ring and PFO closure 3/23/2023, s/p ICD 6/8/17,   HTN, COPD, CKD III, and chronic tobacco/marijuana use.Post LVAD his ICD   lead had with substantial decrease in sensing and gradual increase in   pacing threshold. Thus, he had ICD extraction and reimplant of new ICD   lead on 1/2024. He did well at follow up and device check was stable. This   time, patient was admitted for inappropriate ICD shock x 4 due to lead   insulation breach. Today he presents for ICD and lead removal with plan to   re-implant single lead ICD device.     Of note patient had a [place] scientific device. We will implant a   [employer] device as this has dedicated bipolar sensing instead of   integrated bipolar sensing due to patient's preference.      PROCEDURE  The risks and benefits of the procedure were explained to the patient in   full.  The risks of the procedure include, but are not limited to: pain,   bleeding, blood transfusion reactions, infection, pneumothorax,   perforation of vessels or heart, pericardial effusion, and death. Informed   Consent was obtained. The patient was brought to the EP lab in a fasting   and hemodynamically stable condition. The patient was prepped and draped   in a sterile fashion.   This procedure was performed under moderate sedation under the supervision   of the the Staff Physician. The patient was assessed immediately prior to   administering sedation. The patient received 4 mg Versed and 200 mcg   Fentanyl for a total procedural sedation time of, please see procedure   log. Heart rate, blood pressure, oxygen saturation, and patient responses   were monitored throughout the procedure with the assistance of the RN.     The left chest wall was vigorously washed, prepped, and sterilely draped.   The chest wall was anesthetized with 2% lidocaine. A subclavian  venogram   was performed.    A 5 cm incision was made at the site of the previous device scar. The   subcutaneous tissue was carefully dissected down to the pectoral fascia.   The dissection was carried inferiorly to form a subcutaneous pocket with   adequate hemostasis provided by electrocautery. We were able to visualize   device and cut secure suture, liberating the device and the lead from the   pocket. The subclavian vein was accessed via the pocket and over the first   rib under fluoroscopic guidance, and one guidewire was inserted down into   the level of the IVC.    Then we inserted a stiff stylet into the RV lead. With gentle   counterclockwise movements, we were able to liberate the lead from the   myocardium, then pulled out from the body. On close examination of the   lead, there was an insulation breach close to the lead coil.     A peel away sheath was inserted through the lateral guidewire. The   guidewire and dilator was removed. A right ventricular lead was inserted   through the sheath down to the apical mid RV septum was screwed into the   myocardium. There was very good sensing and pacing threshold at this   position. Ten volt Pacing was performed without diaphragmatic stimulation.   The sheath was then peeled away, and the sleeve adapter was advanced over   the lead. The lead was then secured to the muscle using 0-Ethibond suture.       The pocket was then vigorously washed with antibiotic solution. The right   ventricular lead was inserted into the pulse generator. The pulse   generator and the lead were inserted into the subcutaneous pocket and   secured with a 0-Ethibond suture.    The pocket was then closed in 3 layers using 2-0 Vicryl for the deep   layers and 4-0 Vicryl for the subcuticular layer. Steri-Strips and a   dressing were then applied over the incision site, and the patient was   transported to a monitored bed.    Dr. Montgomery was present throughout the entire  procedure.    EQUIPMENT, MEASUREMENTS ,FINAL PROGRAMMING          PLAN  1. Wound care.  2. Antibiotics: per protocol  3. CXR and Device interrogation in pm  4. Limited TTE to assess pericardial effusion post lead removal.      Echo Limited     Value    LVEF  <20%    [facility]    [id number]  [id number]  [id number]  [id number]^REYES CASTRO^GLADYS     [facility],[place]  Echocardiography Laboratory  [address] [place] [address]     Name: CRISTOBAL THOMAS  MRN: [id number]  : 1965  Study Date: 2024 03:32 PM  Age: 59 yrs  Gender: Male  Patient Location: [facility]  Reason For Study: Pericardial Effusion  Ordering Physician: REYES CASTRO, JORGE  Performed By: Juan Miguel Fermin     BSA: 2.0 m2  Height: 67 in  Weight: 187 lb  ______________________________________________________________________________  Procedure  Limited Echocardiogram with portions of two-dimensional, color and spectral  Doppler performed. Technically difficult study.  ______________________________________________________________________________  Interpretation Summary  The visual ejection fraction is <20%. Severe diffuse hypokinesis is present.  Dilation of the inferior vena cava is present with normal respiratory  variation in diameter. IVC diameter and respiratory changes fall into an  intermediate range suggesting an RA pressure of 8 mmHg.  No pericardial effusion is present.     This study was compared with the study from 23 .  No significant changes noted.  ______________________________________________________________________________  Left Ventricle  The visual ejection fraction is <20%. Severe diffuse hypokinesis is present.  LVAD cannula was seen in the expected anatomic position in the LV apex.     Right Ventricle  The right ventricle cannot be assessed.     Mitral Valve  Mild mitral annular calcification is present.     Aortic Valve  The valve leaflets are not well visualized.     Vessels  Dilation of  the inferior vena cava is present with normal respiratory  variation in diameter. IVC diameter and respiratory changes fall into an  intermediate range suggesting an RA pressure of 8 mmHg.     Pericardium  No pericardial effusion is present.     Compared to Previous Study  This study was compared with the study from 12/27/23 . No significant changes  noted.     Attestation  I have personally viewed the imaging and agree with the interpretation and  report as documented by the fellow, Aung Zeng, and/or edited by me.     ______________________________________________________________________________  Report approved by: Joseph MCKENZIE 09/05/2024 04:02 PM     ______________________________________________________________________________      Cardiac Device Check - Inpatient     Value    Date Time Interrogation Session [id number]    Implantable Pulse Generator  [employer]    Implantable Pulse Generator Model D150 DYNAGEN    Implantable Pulse Generator Serial Number [id number]    Type Interrogation Session In Clinic    Clinic Name [facility]    Implantable Pulse Generator Type Defibrillator    Implantable Pulse Generator Implant Date 20170608    Implantable Lead  [employer]    Implantable Lead Model 0273 [place] 4-Site S    Implantable Lead Serial Number [id number]    Implantable Lead Implant Date 20240104    Implantable Lead Polarity Type Bipolar Lead    Implantable Lead Location Detail 1 UNKNOWN    Implantable Lead Location Right Ventricle    Implantable Lead Connection Status Connected    Jesus Setting Mode (NBG Code) VVI    Jesus Setting Lower Rate Limit 40    Lead Channel Setting Sensing Polarity Bipolar    Lead Channel Setting Sensing Sensitivity 1.5    Lead Channel Setting Sensing Adaptation Mode Adaptive    Lead Channel Setting Pacing Polarity Bipolar    Lead Channel Setting Pacing Pulse Width 0.4    Lead Channel Setting Pacing Amplitude 2.4     Zone Setting Type Category VF    Zone Setting Vendor Type Category VF    Zone Setting Status Inactive    Zone Setting Type Category VT    Zone Setting Vendor Type Category VT    Zone Setting Status Inactive    Lead Channel Impedance Value 287    Battery Date Time of Measurements 20240903132200    Battery Status Beginning of Service    Battery Remaining Longevity 120    Battery Remaining Percentage 100    Capacitor Charge Type Reformation    Capacitor Last Charge Date Time 20240903120000    Capacitor Charge Time 10.7    Capacitor Charge Type Shock    Capacitor Last Charge Date Time 20240903120100    Capacitor Charge Time 7.4    Capacitor Charge Energy 41    Jesus Statistic Date Time Start 20240410000000    Jesus Statistic Date Time End 20240903000000    Jesus Statistic RV Percent Paced 0    Therapy Statistic Recent Shocks Delivered 4    Therapy Statistic Recent Shocks Aborted 0    Therapy Statistic Recent ATP Delivered 1    Therapy Statistic Recent Date Time Start 20240410000000    Therapy Statistic Recent Date Time End 20240903000000    Therapy Statistic Total Shocks Delivered 4    Therapy Statistic Total Shocks Aborted 0    Therapy Statistic Total ATP Delivered 2    Therapy Statistic Total  Date Time Start 20170608000000    Therapy Statistic Total  Date Time End 20240903000000    Episode Statistic Recent Count 3    Episode Statistic Type Category Other    Episode Statistic Recent Count 12    Episode Statistic Type Category VT    Episode Statistic Vendor Type Category NSVT    Episode Statistic Recent Count 1    Episode Statistic Type Category VT    Episode Statistic Vendor Type Category VT    Episode Statistic Recent Count 0    Episode Statistic Type Category VT    Episode Statistic Vendor Type Category VT-1    Episode Statistic Recent Date Time Start 20240410000000    Episode Statistic Recent Date Time End 20240903000000    Episode Statistic Recent Date Time Start 20240410000000    Episode Statistic Recent Date  Time End 20240903000000    Episode Statistic Recent Date Time Start 20240410000000    Episode Statistic Recent Date Time End 20240903000000    Episode Statistic Recent Date Time Start 20240410000000    Episode Statistic Recent Date Time End 20240903000000    Narrative    Patient seen in the [facility] ER #1 for evaluation and iterative programming of their ICD per MD orders. Pt received 4 shocks from his ICD while removing his shoes after getting home from the clinic. The EGM shows oversensing causing device to deliver ATP in the VT zone. The ATP put him into VT at 260 bpm. The 4th 41j shock broke the arrhythmia. Even with the sensitivity at 1.5 mV there was frequent oversensing noted. Per Yumiko Dalton NP, orders the ICD TACHY THERAPIES TURNED OFF. ER staff notified. This lead was implanted 1/4/24. The impedance has decreased from 495 ohms to 287 ohms, and the R waves have decreased from 10 mV to 3 mV.    Device: [employer] D150 ISABELLE Washington RN    Single lead ICD    I have reviewed and interpreted the device interrogation, settings, programming and nurse's summary. The device is functioning within normal device parameters. I agree with the current findings, assessment and plan.     *Note: Due to a large number of results and/or encounters for the requested time period, some results have not been displayed. A complete set of results can be found in Results Review.       DISCHARGE MEDICATIONS:  Current Discharge Medication List        START taking these medications    Details   cephALEXin (KEFLEX) 500 MG capsule Take 1 capsule (500 mg) by mouth 3 times daily for 4 days.  Qty: 12 capsule, Refills: 0    Associated Diagnoses: AICD lead malfunction      !! oxyCODONE-acetaminophen (PERCOCET) 5-325 MG tablet Take 1 tablet by mouth every 6 hours as needed for pain.  Qty: 5 tablet, Refills: 0    Comments: For further refills can reach out to his PCP  Associated Diagnoses: AICD lead malfunction       !! -  Potential duplicate medications found. Please discuss with provider.        CONTINUE these medications which have NOT CHANGED    Details   acetaminophen (TYLENOL) 325 MG tablet Take 2 tablets (650 mg) by mouth every 4 hours as needed for other (For optimal non-opioid multimodal pain management to improve pain control.)  Qty: 100 tablet, Refills: 0    Associated Diagnoses: LVAD (left ventricular assist device) present (H)      amiodarone (PACERONE) 200 MG tablet Take 0.5 tablets (100 mg) by mouth daily.  Qty: 45 tablet, Refills: 0    Associated Diagnoses: LVAD (left ventricular assist device) present (H)      amoxicillin (AMOXIL) 500 MG tablet Take 4 tablets (2,000 mg) by mouth as needed (Take one hour before dental procedure.)  Qty: 4 tablet, Refills: 2    Associated Diagnoses: Chronic systolic congestive heart failure (H); LVAD (left ventricular assist device) present (H)      bumetanide (BUMEX) 1 MG tablet Take 1 tablet (1 mg) by mouth every other day  Qty: 45 tablet, Refills: 3    Associated Diagnoses: LVAD (left ventricular assist device) present (H)      digoxin (LANOXIN) 250 MCG tablet Take 1 tablet (250 mcg) by mouth daily  Qty: 30 tablet, Refills: 11    Associated Diagnoses: LVAD (left ventricular assist device) present (H)      empagliflozin (JARDIANCE) 10 MG TABS tablet Take 1 tablet (10 mg) by mouth daily  Qty: 90 tablet, Refills: 3    Associated Diagnoses: LVAD (left ventricular assist device) present (H)      eplerenone (INSPRA) 25 MG tablet Take 12.5mg (1/2 tab) daily  Qty: 45 tablet, Refills: 3    Associated Diagnoses: Left ventricular assist device present (H); Chronic systolic congestive heart failure (H)      JANTOVEN ANTICOAGULANT 5 MG tablet Take 2.5-5 mg by mouth daily. Take 5 mg on Mondays, Wednesdays, and Fridays and 2.5 mg all other days of the week or as directed by your anticoagulation clinic.      lisinopril (ZESTRIL) 5 MG tablet Take 5 mg by mouth daily.      !! oxyCODONE-acetaminophen  (PERCOCET) 5-325 MG tablet Take 1 tablet by mouth every 6 hours as needed for severe pain.      pantoprazole (PROTONIX) 20 MG EC tablet Take 1 tablet (20 mg) by mouth every morning (before breakfast)  Qty: 90 tablet, Refills: 3    Associated Diagnoses: LVAD (left ventricular assist device) present (H)      sildenafil (VIAGRA) 50 MG tablet Take 1 tablet (50 mg) by mouth daily as needed (PRN for sexual activity)  Qty: 30 tablet, Refills: 0    Comments: Primary care MD to refill this medication  Associated Diagnoses: Erectile dysfunction, unspecified erectile dysfunction type       !! - Potential duplicate medications found. Please discuss with provider.        STOP taking these medications       sacubitril-valsartan (ENTRESTO) 24-26 MG per tablet Comments:   Reason for Stopping:               DISCHARGE DISPOSITION: Akshat Fragoso will discharge to home in stable condition.     DISCHARGE INSTRUCTIONS:  Discharge Procedure Orders   Follow Up ([facility])   Order Comments: Follow up with Dr. Aguiar , at [facility], within 1-2 weeks  to evaluate medication change. No follow up labs or test are needed.    Appointments on [place] and/or [facility] (with [facility] or [facility] provider or service). Call [phone] if you haven't heard regarding these appointments within 7 days of discharge.     Reason for your hospital stay   Order Comments: Inappropriate ICD shock and device exchange     Activity   Order Comments: Your activity upon discharge: activity as tolerated     Order Specific Question Answer Comments   Is discharge order? Yes      Diet   Order Comments: Follow this diet upon discharge: Current Diet:Orders Placed This Encounter      Advance Diet as Tolerated: Clear Liquid Diet     Order Specific Question Answer Comments   Is discharge order? Yes        It was our pleasure to care for Akshat Fragoso during this hospitalization. Please do not hesitate to contact me should there be questions regarding the hospital course or  discharge plan.      Thank you for allowing me to care for this patient, please don't hesitate to contact me with any questions regarding this plan.   Patient was discussed and evaluated with Dr. Ulises GODOY, attending physician, who agrees with the assessment and plan above.    Rachid Montgomery MD, PhD, MSc  Cardiology Fellow

## 2024-09-06 NOTE — TELEPHONE ENCOUNTER
----- Message from Miroslava CHAMBERLAIN sent at 9/6/2024  9:05 AM CDT -----  Regarding: FW: Post discharge follow up    ----- Message -----  From: Shelia Cantor RN  Sent: 9/6/2024   9:02 AM CDT  To: Cardiology V[facility] Coordinators-  Subject: FW: Post discharge follow up                       ----- Message -----  From: Rachid Montgomery MD  Sent: 9/6/2024   8:44 AM CDT  To: Wade Montgomery MD; Shaun Aguiar MD; #  Subject: Post discharge follow up                         Could you help scheduling follow up appointment after discharge for Mr. Fragoso. LVAD is working fine. He was admitted for inappropriate shock by his device. Device and leads replaced w/o complications.      Thank you,    Rachid Rodriguez fellow

## 2024-09-06 NOTE — TELEPHONE ENCOUNTER
ANTICOAGULATION  MANAGEMENT: Discharge Review    Akshat Fragoso chart reviewed for anticoagulation continuity of care    Hospital Admission on 9/3 - 9/6/24 for AICD lead malfunction. He went in after receiving inappropriate ICD shocks from his device x 4. Underwent lead and battery exchange on 9/5 without chaparro procedural  complications.     Discharge disposition: Home    Results:    Recent labs: (last 7 days)     09/03/24  1009 09/03/24  1239 09/04/24  0536 09/05/24  0530 09/06/24  0432   INR 2.07* 2.11* 2.22* 2.26* 2.54*     Anticoagulation inpatient management:     home regimen continued    Anticoagulation discharge instructions:     Warfarin dosing: home regimen continued   Bridging: No   INR goal change: No      Medication changes affecting anticoagulation: Yes:taking Keflex x 4 days 9/6 - 9/10/24     taking tylenol prn pain; RX for percocet prn pain     Additional factors affecting anticoagulation: Yes: recovering from hospital stay; discomfort at surgical site     PLAN     No adjustment to anticoagulation plan needed    Spoke with spouse, Landry .  She thinks Akshat will need to take tylenol for several days.  They will check his INR on 9/9/24.    Anticoagulation Calendar updated    Yessenia Mcfarland RN

## 2024-09-06 NOTE — PLAN OF CARE
Goal Outcome Evaluation:                      Neuro: A&Ox4.   Cardiac: SR/SA.  VSS.  Pt HM 3 LVAD.   New ICD place with lead wires today     Respiratory: Sating 94%> on RA.  GI/: Adequate urine output into tiolet.  No BM this shift  Diet/appetite: Tolerating regular diet. Eating well.  Activity:  Indepent, up to chair and in halls.  Pain: At acceptable level on current regimen.   Skin: No new deficits noted.  LDA's:HM 3, PIV     Plan: Continue with POC. Notify primary team with changes.

## 2024-09-06 NOTE — PROGRESS NOTES
DISCHARGE                         9/6/2024  1:06 PM  ----------------------------------------------------------------------------  Discharged to: Home  Via: private transportation  Accompanied by: Family  Discharge Instructions: Regular diet, encouraged activity, medications, follow up appointments, when to call the MD, aftercare instructions.  Prescriptions: To be filled by [place] [facility]; medication list reviewed & sent with pt  Follow Up Appointments: arranged; information given  Belongings: All sent with pt  IV: d/c'd  Telemetry: d/c'd  Pt exhibits understanding of above discharge instructions; all questions answered.    Discharge Paperwork: Copied and sent home with patient.

## 2024-09-06 NOTE — TELEPHONE ENCOUNTER
Pt wife calling regarding warfarin refill, since last week , about to run out for weekend , is in hospital  currently, needs asap please advise. [phone]

## 2024-09-06 NOTE — TELEPHONE ENCOUNTER
9/6/24:  RX for warfarin 5 mg tablets sent to patient's pharmacy.  Notified patient through [employer]  Spoke with his spouse and she requested RX be sent to [facility] in the [facility]. Yessenia Mcfarland RN

## 2024-09-08 LAB
ATRIAL RATE - MUSE: NORMAL BPM
ATRIAL RATE - MUSE: NORMAL BPM
DIASTOLIC BLOOD PRESSURE - MUSE: NORMAL MMHG
DIASTOLIC BLOOD PRESSURE - MUSE: NORMAL MMHG
INTERPRETATION ECG - MUSE: NORMAL
INTERPRETATION ECG - MUSE: NORMAL
P AXIS - MUSE: NORMAL DEGREES
P AXIS - MUSE: NORMAL DEGREES
PR INTERVAL - MUSE: NORMAL MS
PR INTERVAL - MUSE: NORMAL MS
QRS DURATION - MUSE: 142 MS
QRS DURATION - MUSE: 144 MS
QT - MUSE: 456 MS
QT - MUSE: 472 MS
QTC - MUSE: 436 MS
QTC - MUSE: 463 MS
R AXIS - MUSE: -74 DEGREES
R AXIS - MUSE: 239 DEGREES
SYSTOLIC BLOOD PRESSURE - MUSE: NORMAL MMHG
SYSTOLIC BLOOD PRESSURE - MUSE: NORMAL MMHG
T AXIS - MUSE: -7 DEGREES
T AXIS - MUSE: 52 DEGREES
VENTRICULAR RATE- MUSE: 55 BPM
VENTRICULAR RATE- MUSE: 58 BPM

## 2024-09-09 ENCOUNTER — TELEPHONE (OUTPATIENT)
Dept: CARDIOLOGY | Facility: CLINIC | Age: 59
End: 2024-09-09
Payer: COMMERCIAL

## 2024-09-09 LAB
PATH REPORT.COMMENTS IMP SPEC: NORMAL
PATH REPORT.COMMENTS IMP SPEC: NORMAL
PATH REPORT.FINAL DX SPEC: NORMAL
PATH REPORT.GROSS SPEC: NORMAL
PATH REPORT.MICROSCOPIC SPEC OTHER STN: NORMAL
PATH REPORT.RELEVANT HX SPEC: NORMAL
PHOTO IMAGE: NORMAL

## 2024-09-09 NOTE — TELEPHONE ENCOUNTER
9/9 Left Voicemail (2nd Attempt) and Sent Mychart (2nd Attempt) for the patient to call back and schedule the following:    Appointment type: Return VAD  Provider: Cosme   Return date: FEB 2025  Specialty phone number: [phone] opt 1  Additional appointment(s) needed: labs and device check prior   Additonal Notes: n/a

## 2024-09-09 NOTE — TELEPHONE ENCOUNTER
Called pharmacy, discussed order was discontinued on 9/6 when patient discharged from hospital.

## 2024-09-09 NOTE — TELEPHONE ENCOUNTER
[facility] Call Center    Phone Message    May a detailed message be left on voicemail: yes     Reason for Call: Medication Question or concern regarding medication   Prescription Clarification  Name of Medication: Entresto  Prescribing Provider: Elba Chen PA-C    Pharmacy:   [place], [place] - [address] [place] [phone]      What on the order needs clarification? Clarification on orders/directions      Action Taken: Other: cardio    Travel Screening: Not Applicable    Thank you!  [facility]       Date of Service:

## 2024-09-10 ENCOUNTER — CARE COORDINATION (OUTPATIENT)
Dept: CARDIOLOGY | Facility: CLINIC | Age: 59
End: 2024-09-10
Payer: COMMERCIAL

## 2024-09-10 ENCOUNTER — TELEPHONE (OUTPATIENT)
Dept: CARDIOLOGY | Facility: CLINIC | Age: 59
End: 2024-09-10
Payer: COMMERCIAL

## 2024-09-10 ENCOUNTER — ALLIED HEALTH/NURSE VISIT (OUTPATIENT)
Dept: CARDIOLOGY | Facility: CLINIC | Age: 59
End: 2024-09-10
Attending: INTERNAL MEDICINE
Payer: COMMERCIAL

## 2024-09-10 ENCOUNTER — CARE COORDINATION (OUTPATIENT)
Dept: TRANSPLANT | Facility: CLINIC | Age: 59
End: 2024-09-10

## 2024-09-10 VITALS — WEIGHT: 190 LBS | BODY MASS INDEX: 29.76 KG/M2 | SYSTOLIC BLOOD PRESSURE: 62 MMHG | OXYGEN SATURATION: 94 %

## 2024-09-10 DIAGNOSIS — Z95.810 ICD (IMPLANTABLE CARDIOVERTER-DEFIBRILLATOR) IN PLACE: ICD-10-CM

## 2024-09-10 DIAGNOSIS — Z95.811 LEFT VENTRICULAR ASSIST DEVICE PRESENT (H): ICD-10-CM

## 2024-09-10 DIAGNOSIS — I42.9 CARDIOMYOPATHY (H): ICD-10-CM

## 2024-09-10 DIAGNOSIS — Z95.811 LVAD (LEFT VENTRICULAR ASSIST DEVICE) PRESENT (H): ICD-10-CM

## 2024-09-10 DIAGNOSIS — I50.22 CHRONIC SYSTOLIC HEART FAILURE (H): Primary | ICD-10-CM

## 2024-09-10 DIAGNOSIS — I50.22 CHRONIC SYSTOLIC HEART FAILURE (H): ICD-10-CM

## 2024-09-10 LAB
MDC_IDC_LEAD_CONNECTION_STATUS: NORMAL
MDC_IDC_LEAD_IMPLANT_DT: NORMAL
MDC_IDC_LEAD_LOCATION: NORMAL
MDC_IDC_LEAD_LOCATION_DETAIL_1: NORMAL
MDC_IDC_LEAD_MFG: NORMAL
MDC_IDC_LEAD_MODEL: NORMAL
MDC_IDC_LEAD_POLARITY_TYPE: NORMAL
MDC_IDC_LEAD_SERIAL: NORMAL
MDC_IDC_LEAD_SPECIAL_FUNCTION: NORMAL
MDC_IDC_MSMT_BATTERY_DTM: NORMAL
MDC_IDC_MSMT_BATTERY_REMAINING_LONGEVITY: 173 MO
MDC_IDC_MSMT_BATTERY_RRT_TRIGGER: NORMAL
MDC_IDC_MSMT_BATTERY_STATUS: NORMAL
MDC_IDC_MSMT_BATTERY_VOLTAGE: 3.16 V
MDC_IDC_MSMT_CAP_CHARGE_ENERGY: 40 J
MDC_IDC_MSMT_CAP_CHARGE_TIME: 0 S
MDC_IDC_MSMT_CAP_CHARGE_TYPE: NORMAL
MDC_IDC_MSMT_LEADCHNL_RV_IMPEDANCE_VALUE: 323 OHM
MDC_IDC_MSMT_LEADCHNL_RV_IMPEDANCE_VALUE: 418 OHM
MDC_IDC_MSMT_LEADCHNL_RV_PACING_THRESHOLD_AMPLITUDE: 0.75 V
MDC_IDC_MSMT_LEADCHNL_RV_PACING_THRESHOLD_PULSEWIDTH: 0.4 MS
MDC_IDC_MSMT_LEADCHNL_RV_SENSING_INTR_AMPL: 16.9 MV
MDC_IDC_PG_IMPLANT_DTM: NORMAL
MDC_IDC_PG_MFG: NORMAL
MDC_IDC_PG_MODEL: NORMAL
MDC_IDC_PG_SERIAL: NORMAL
MDC_IDC_PG_TYPE: NORMAL
MDC_IDC_SESS_CLINIC_NAME: NORMAL
MDC_IDC_SESS_DTM: NORMAL
MDC_IDC_SESS_TYPE: NORMAL
MDC_IDC_SET_BRADY_HYSTRATE: NORMAL
MDC_IDC_SET_BRADY_LOWRATE: 40 {BEATS}/MIN
MDC_IDC_SET_BRADY_MODE: NORMAL
MDC_IDC_SET_LEADCHNL_RA_SENSING_SENSITIVITY: NORMAL
MDC_IDC_SET_LEADCHNL_RV_PACING_AMPLITUDE: 3.5 V
MDC_IDC_SET_LEADCHNL_RV_PACING_ANODE_ELECTRODE_1: NORMAL
MDC_IDC_SET_LEADCHNL_RV_PACING_ANODE_LOCATION_1: NORMAL
MDC_IDC_SET_LEADCHNL_RV_PACING_CAPTURE_MODE: NORMAL
MDC_IDC_SET_LEADCHNL_RV_PACING_CATHODE_ELECTRODE_1: NORMAL
MDC_IDC_SET_LEADCHNL_RV_PACING_CATHODE_LOCATION_1: NORMAL
MDC_IDC_SET_LEADCHNL_RV_PACING_POLARITY: NORMAL
MDC_IDC_SET_LEADCHNL_RV_PACING_PULSEWIDTH: 0.4 MS
MDC_IDC_SET_LEADCHNL_RV_SENSING_ANODE_ELECTRODE_1: NORMAL
MDC_IDC_SET_LEADCHNL_RV_SENSING_ANODE_LOCATION_1: NORMAL
MDC_IDC_SET_LEADCHNL_RV_SENSING_CATHODE_ELECTRODE_1: NORMAL
MDC_IDC_SET_LEADCHNL_RV_SENSING_CATHODE_LOCATION_1: NORMAL
MDC_IDC_SET_LEADCHNL_RV_SENSING_POLARITY: NORMAL
MDC_IDC_SET_LEADCHNL_RV_SENSING_SENSITIVITY: 0.45 MV
MDC_IDC_SET_ZONE_DETECTION_BEATS_DENOMINATOR: 16 {BEATS}
MDC_IDC_SET_ZONE_DETECTION_BEATS_DENOMINATOR: 32 {BEATS}
MDC_IDC_SET_ZONE_DETECTION_BEATS_DENOMINATOR: 40 {BEATS}
MDC_IDC_SET_ZONE_DETECTION_BEATS_NUMERATOR: 16 {BEATS}
MDC_IDC_SET_ZONE_DETECTION_BEATS_NUMERATOR: 30 {BEATS}
MDC_IDC_SET_ZONE_DETECTION_BEATS_NUMERATOR: 32 {BEATS}
MDC_IDC_SET_ZONE_DETECTION_INTERVAL: 240 MS
MDC_IDC_SET_ZONE_DETECTION_INTERVAL: 280 MS
MDC_IDC_SET_ZONE_DETECTION_INTERVAL: 330 MS
MDC_IDC_SET_ZONE_DETECTION_INTERVAL: 450 MS
MDC_IDC_SET_ZONE_STATUS: NORMAL
MDC_IDC_SET_ZONE_TYPE: NORMAL
MDC_IDC_SET_ZONE_VENDOR_TYPE: NORMAL
MDC_IDC_STAT_AT_BURDEN_PERCENT: 0 %
MDC_IDC_STAT_AT_DTM_END: NORMAL
MDC_IDC_STAT_AT_DTM_START: NORMAL
MDC_IDC_STAT_BRADY_DTM_END: NORMAL
MDC_IDC_STAT_BRADY_DTM_START: NORMAL
MDC_IDC_STAT_BRADY_RA_PERCENT_PACED: NORMAL
MDC_IDC_STAT_BRADY_RV_PERCENT_PACED: 0.09 %
MDC_IDC_STAT_EPISODE_RECENT_COUNT: 0
MDC_IDC_STAT_EPISODE_RECENT_COUNT_DTM_END: NORMAL
MDC_IDC_STAT_EPISODE_RECENT_COUNT_DTM_START: NORMAL
MDC_IDC_STAT_EPISODE_TOTAL_COUNT: 0
MDC_IDC_STAT_EPISODE_TOTAL_COUNT_DTM_END: NORMAL
MDC_IDC_STAT_EPISODE_TOTAL_COUNT_DTM_START: NORMAL
MDC_IDC_STAT_EPISODE_TYPE: NORMAL
MDC_IDC_STAT_TACHYTHERAPY_ATP_DELIVERED_RECENT: 0
MDC_IDC_STAT_TACHYTHERAPY_ATP_DELIVERED_TOTAL: 0
MDC_IDC_STAT_TACHYTHERAPY_RECENT_DTM_END: NORMAL
MDC_IDC_STAT_TACHYTHERAPY_RECENT_DTM_START: NORMAL
MDC_IDC_STAT_TACHYTHERAPY_SHOCKS_ABORTED_RECENT: 0
MDC_IDC_STAT_TACHYTHERAPY_SHOCKS_ABORTED_TOTAL: 0
MDC_IDC_STAT_TACHYTHERAPY_SHOCKS_DELIVERED_RECENT: 0
MDC_IDC_STAT_TACHYTHERAPY_SHOCKS_DELIVERED_TOTAL: 0
MDC_IDC_STAT_TACHYTHERAPY_TOTAL_DTM_END: NORMAL
MDC_IDC_STAT_TACHYTHERAPY_TOTAL_DTM_START: NORMAL

## 2024-09-10 PROCEDURE — 93282 PRGRMG EVAL IMPLANTABLE DFB: CPT | Performed by: INTERNAL MEDICINE

## 2024-09-10 ASSESSMENT — PAIN SCALES - GENERAL: PAINLEVEL: NO PAIN (0)

## 2024-09-10 NOTE — NURSING NOTE
Chief Complaint   Patient presents with    Follow Up     NURSE ONLY    Got device wet - here for check on device to make sure it's working.      Vitals were taken and medications reconciled.    Dakota Diaz, EMT  3:13 PM

## 2024-09-10 NOTE — PROGRESS NOTES
D: patient paged on call coordinator regarding water getting on vad equipment     I/A: patient states he hopped in the shower without thinking and did not have his shower bag on. He had power disconnect alarms, went to MPU and alarms stopped.   Batteries, clips, controller all wet.     P: Patient will come to clinic asap for assessment, will bring all equipment including MPU. Patient did not put wet batteries back into . Will discuss with primary coordinator.  Patient notified to page on-call coordinator if symptoms worsen or with other concerns. Patient verbalized understanding.

## 2024-09-10 NOTE — NURSING NOTE
Saw patient today in clinic s/p VAD equipment exposure to water. Replaced all equipment with water exposure, including modular cable, controller, 2 clips, 2 batteries, and an MPU. See checklist for details regarding serial numbers of issued equipment.     Performed routine MAP check, MAP was 64. Pt asymptomatic; PI a bit lower than baseline, but otherwise WNL. Pt endorses taking a PRN Bumex yesterday with 8 pound weight loss this morning. Instructed pt to drink 2 glasses of water and recheck MAP one hour after drinking this water. Informed pt that additional common causes of low MAP can include arrhythmias and bleeding, educated pt and spouse on s/s of these. Emphasized that they should have a low threshold to page the VAD coordinator on call and should page with any symptoms and/or a MAP of 65 or less on recheck this evening. Pt and spouse expressed understanding of recommendations.

## 2024-09-10 NOTE — PROGRESS NOTES
Heart Transplant Intake Call  Date:  ***    Pt referred for Heart Transplant evaluation. Called patient and introduced the Heart Transplant Program.  Spoke with patient to discuss the evaluation process for transplant and to make plan for further follow up and education.     Discussed need for caregiver support during evaluation process, and post transplant and/or VAD implant. Pt's 30-day caregiver will be ***.     Discussed importance of routine health maintenance while on the transplant list.   Last Dental Visit: *** Discussed importance of seeing dentist at least once every 12 months in order to maintain transplant eligibility. Pt encouraged to ask their dental clinic to send a letter of dental clearance to the transplant office.   Last Colonoscopy: 3/2023 Records to be obtained from: [facility]  Last PSA:  Due for yearly exam  Discussed importance of vaccines prior to transplant and will send letter to pt's PCP regarding vaccine recommendations. Pt is aware that the COVID-19 vaccine is currently a requirement for transplant listing      Discussed importance of avoiding nicotine, illegal drugs - including cannabis -, and using alcohol only minimally or none at all as decided upon between patient and advanced heart failure cardiologist. Discussed age and BMI requirements for transplant listing.    Heart Transplant Packet and information for [facility] was sent to the patient via ***. Patient was encouraged to review and sign this information. They were encouraged to have a caregiver present during heart transplant teaching.     Plan: Patient verbalized understanding and in agreement with the plan for evaluation. Was engaged and forthcoming with information. Asked appropriate questions in regard to this process. Patient denied any further transplant related questions and/or concerned.  Patient given the contact information to the transplant office and understands to contact coordinator with any further  questions or concerns.

## 2024-09-10 NOTE — TELEPHONE ENCOUNTER
9/10 spoke to pt and attempted to schedule an appt w/ VAD RABIA with labs prior in December and Return VAD w/ Cosme with labs and devic e check prior in March 2025  Pt stated that he's in an appt now and will call back to schedule

## 2024-09-13 ENCOUNTER — ANTICOAGULATION THERAPY VISIT (OUTPATIENT)
Dept: ANTICOAGULATION | Facility: CLINIC | Age: 59
End: 2024-09-13
Payer: COMMERCIAL

## 2024-09-13 ENCOUNTER — TELEPHONE (OUTPATIENT)
Dept: CARDIOLOGY | Facility: CLINIC | Age: 59
End: 2024-09-13
Payer: COMMERCIAL

## 2024-09-13 DIAGNOSIS — Z79.899 LONG TERM USE OF DRUG: ICD-10-CM

## 2024-09-13 DIAGNOSIS — I50.22 CHRONIC SYSTOLIC CONGESTIVE HEART FAILURE (H): ICD-10-CM

## 2024-09-13 DIAGNOSIS — I50.23 ACUTE ON CHRONIC SYSTOLIC CONGESTIVE HEART FAILURE (H): Primary | ICD-10-CM

## 2024-09-13 DIAGNOSIS — Z95.811 LEFT VENTRICULAR ASSIST DEVICE PRESENT (H): ICD-10-CM

## 2024-09-13 DIAGNOSIS — Z79.01 ANTICOAGULATED ON COUMADIN: ICD-10-CM

## 2024-09-13 DIAGNOSIS — Z95.811 LVAD (LEFT VENTRICULAR ASSIST DEVICE) PRESENT (H): ICD-10-CM

## 2024-09-13 LAB — INR HOME MONITORING: 2.4 (ref 2–3)

## 2024-09-13 NOTE — PROGRESS NOTES
ANTICOAGULATION MANAGEMENT     Akshat Fragoso 59 year old male is on warfarin with therapeutic INR result. (Goal INR 2.0-3.0)    Recent labs: (last 7 days)     09/13/24  0000   INR 2.4       ASSESSMENT     Source(s): Chart Review  Previous INR was Therapeutic last 2(+) visits  Medication, diet, health changes since last INR chart reviewed; none identified  Recent ER visit for ICD malfuction and inappropriate shocks. Additionally, had all LVAD equipment exchanged out due to accidentally getting into the shower without a shower bag. Abx completed 9/10         PLAN     Recommended plan for no diet, medication or health factor changes affecting INR     Dosing Instructions: Continue your current warfarin dose with next INR in 1 week       Summary  As of 9/13/2024      Full warfarin instructions:  5 mg every Mon, Wed, Fri; 2.5 mg all other days   Next INR check:  9/20/2024               Detailed voice message left for Akshat with dosing instructions and follow up date.   Sent [employer] message with dosing and follow up instructions    Patient to recheck with home meter    Education provided: Please call back if any changes to your diet, medications or how you've been taking warfarin    Plan made per [facility] anticoagulation protocol and per LVAD protocol    Denisse Pillai RN  9/13/2024  Anticoagulation Clinic  [facility] for routing messages: p ANTICOAG LVAD  [facility] patient phone line: [phone]        _______________________________________________________________________     Anticoagulation Episode Summary       Current INR goal:  2.0-3.0   TTR:  88.6% (11.8 mo)   Target end date:  Indefinite   Send INR reminders to:  ANTICOAG LVAD    Indications    Acute on chronic systolic congestive heart failure (H) [I50.23]  LVAD (left ventricular assist device) present (H) [Z95.811]  Chronic systolic congestive heart failure (H) [I50.22]  Long term use of drug [Z79.899]  Left ventricular assist device present (H) [Z95.811]  Anticoagulated  on Coumadin [Z79.01]             Comments:  Follow VAD Anticoag protocol:Yes: HeartMate 3   Bridging: Enoxaparin   Date VAD placed: 3/23/23             Anticoagulation Care Providers       Provider Role Specialty Phone number    Kenzie Moreau MD Referring Advanced Heart Failure and Transplant Cardiology [phone]    Mile Bolivar, VERONICA CNP Referring Nurse Practitioner [phone]    Shaun gAuiar MD Referring Cardiovascular Disease [phone]

## 2024-09-13 NOTE — TELEPHONE ENCOUNTER
9/13 lvm and sent Roberts Chapelt for pt to schedule a VAD follow-up with any RABIA or MD for hospital follow-up [facility]

## 2024-09-13 NOTE — TELEPHONE ENCOUNTER
----- Message from Jamia HICKS sent at 9/9/2024 10:22 AM CDT -----  Regarding: RE: Post discharge follow up  Left pt a VM to schedule on the next available. Will try him again in a few days.    Jamia  ----- Message -----  From: Nathalia Roberson, WILDER  Sent: 9/6/2024   9:46 AM CDT  To: Jamia Kingsley; Cardiology Device Nurse-; #  Subject: RE: Post discharge follow up                     Hi there.  Can we please get this patient a follow up appointment for first available VAD cardiology appt?  It can be an RABIA or MD.    Rosita  ----- Message -----  From: Miroslava Lobato RN  Sent: 9/6/2024   9:06 AM CDT  To: Jamia Kingsley; Nathalia Roberson, RN; #  Subject: FW: Post discharge follow up                       ----- Message -----  From: Shelia Cantor RN  Sent: 9/6/2024   9:02 AM CDT  To: Cardiology Vad Coordinators-  Subject: FW: Post discharge follow up                       ----- Message -----  From: Rachid Montgomery MD  Sent: 9/6/2024   8:44 AM CDT  To: Wade Montgomery MD; Shaun Aguiar MD; #  Subject: Post discharge follow up                         Could you help scheduling follow up appointment after discharge for Mr. Fragoso. LVAD is working fine. He was admitted for inappropriate shock by his device. Device and leads replaced w/o complications.      Thank you,    Rachid    Cards fellow

## 2024-09-14 ENCOUNTER — CARE COORDINATION (OUTPATIENT)
Dept: CARDIOLOGY | Facility: CLINIC | Age: 59
End: 2024-09-14
Payer: COMMERCIAL

## 2024-09-15 NOTE — PROGRESS NOTES
"Pt paged VAD Coordinator on call to report odd alarm from controller.     Pt states he was brushing his teeth and had a \"Connect Power\" alarm. He was on battery power, and batteries had about 75% power remaining. He checked connection at power cable and clip and confirmed it was secure.   Alarm was brief, lasting only a few seconds, and self resolved. He is not sure which power cable was alarming as disconnected.   Pt carries his equipment in a cross-body bag.   Pt was issued new equipment just a few days ago, so has not needed to clean contacts.   Due to brevity of alarm, suspect alarm was due to positioning of equipment in his bag causing a momentary poor connection between battery and clip. Instructed pt to label his equipment, number the batteries 1-8 and clips 1-4. If alarm reoccurs, try to check which power cable light is illuminated. From there we can continue to narrow down if there is faulty equipment or if this is a positional issue.   Pt verbalized understanding.   "

## 2024-09-17 LAB
MDC_IDC_LEAD_CONNECTION_STATUS: NORMAL
MDC_IDC_LEAD_IMPLANT_DT: NORMAL
MDC_IDC_LEAD_LOCATION: NORMAL
MDC_IDC_LEAD_LOCATION_DETAIL_1: NORMAL
MDC_IDC_LEAD_MFG: NORMAL
MDC_IDC_LEAD_MODEL: NORMAL
MDC_IDC_LEAD_POLARITY_TYPE: NORMAL
MDC_IDC_LEAD_SERIAL: NORMAL
MDC_IDC_LEAD_SPECIAL_FUNCTION: NORMAL
MDC_IDC_MSMT_BATTERY_DTM: NORMAL
MDC_IDC_MSMT_BATTERY_REMAINING_LONGEVITY: 114 MO
MDC_IDC_MSMT_BATTERY_REMAINING_LONGEVITY: 120 MO
MDC_IDC_MSMT_BATTERY_REMAINING_LONGEVITY: NORMAL
MDC_IDC_MSMT_BATTERY_REMAINING_PERCENTAGE: 100 %
MDC_IDC_MSMT_BATTERY_REMAINING_PERCENTAGE: 100 %
MDC_IDC_MSMT_BATTERY_RRT_TRIGGER: NORMAL
MDC_IDC_MSMT_BATTERY_STATUS: NORMAL
MDC_IDC_MSMT_BATTERY_VOLTAGE: 3.13 V
MDC_IDC_MSMT_CAP_CHARGE_DTM: NORMAL
MDC_IDC_MSMT_CAP_CHARGE_ENERGY: 40 J
MDC_IDC_MSMT_CAP_CHARGE_ENERGY: 41 J
MDC_IDC_MSMT_CAP_CHARGE_TIME: 0 S
MDC_IDC_MSMT_CAP_CHARGE_TIME: 10.6 S
MDC_IDC_MSMT_CAP_CHARGE_TIME: 10.7 S
MDC_IDC_MSMT_CAP_CHARGE_TIME: 7.4 S
MDC_IDC_MSMT_CAP_CHARGE_TYPE: NORMAL
MDC_IDC_MSMT_LEADCHNL_RV_IMPEDANCE_VALUE: 287 OHM
MDC_IDC_MSMT_LEADCHNL_RV_IMPEDANCE_VALUE: 306 OHM
MDC_IDC_MSMT_LEADCHNL_RV_IMPEDANCE_VALUE: 342 OHM
MDC_IDC_MSMT_LEADCHNL_RV_IMPEDANCE_VALUE: 437 OHM
MDC_IDC_MSMT_LEADCHNL_RV_LEAD_CHANNEL_STATUS: NORMAL
MDC_IDC_MSMT_LEADCHNL_RV_PACING_THRESHOLD_AMPLITUDE: 0.75 V
MDC_IDC_MSMT_LEADCHNL_RV_PACING_THRESHOLD_AMPLITUDE: 0.9 V
MDC_IDC_MSMT_LEADCHNL_RV_PACING_THRESHOLD_PULSEWIDTH: 0.4 MS
MDC_IDC_MSMT_LEADCHNL_RV_PACING_THRESHOLD_PULSEWIDTH: 0.4 MS
MDC_IDC_MSMT_LEADCHNL_RV_SENSING_INTR_AMPL: 14.1 MV
MDC_IDC_PG_IMPLANT_DTM: NORMAL
MDC_IDC_PG_MFG: NORMAL
MDC_IDC_PG_MODEL: NORMAL
MDC_IDC_PG_SERIAL: NORMAL
MDC_IDC_PG_TYPE: NORMAL
MDC_IDC_SESS_CLINIC_NAME: NORMAL
MDC_IDC_SESS_DTM: NORMAL
MDC_IDC_SESS_TYPE: NORMAL
MDC_IDC_SET_BRADY_HYSTRATE: NORMAL
MDC_IDC_SET_BRADY_LOWRATE: 40 {BEATS}/MIN
MDC_IDC_SET_BRADY_MODE: NORMAL
MDC_IDC_SET_LEADCHNL_RA_SENSING_SENSITIVITY: NORMAL
MDC_IDC_SET_LEADCHNL_RV_PACING_AMPLITUDE: 2.4 V
MDC_IDC_SET_LEADCHNL_RV_PACING_AMPLITUDE: 2.4 V
MDC_IDC_SET_LEADCHNL_RV_PACING_AMPLITUDE: 3.5 V
MDC_IDC_SET_LEADCHNL_RV_PACING_ANODE_ELECTRODE_1: NORMAL
MDC_IDC_SET_LEADCHNL_RV_PACING_ANODE_LOCATION_1: NORMAL
MDC_IDC_SET_LEADCHNL_RV_PACING_CAPTURE_MODE: NORMAL
MDC_IDC_SET_LEADCHNL_RV_PACING_CATHODE_ELECTRODE_1: NORMAL
MDC_IDC_SET_LEADCHNL_RV_PACING_CATHODE_LOCATION_1: NORMAL
MDC_IDC_SET_LEADCHNL_RV_PACING_POLARITY: NORMAL
MDC_IDC_SET_LEADCHNL_RV_PACING_PULSEWIDTH: 0.4 MS
MDC_IDC_SET_LEADCHNL_RV_SENSING_ADAPTATION_MODE: NORMAL
MDC_IDC_SET_LEADCHNL_RV_SENSING_ADAPTATION_MODE: NORMAL
MDC_IDC_SET_LEADCHNL_RV_SENSING_ANODE_ELECTRODE_1: NORMAL
MDC_IDC_SET_LEADCHNL_RV_SENSING_ANODE_LOCATION_1: NORMAL
MDC_IDC_SET_LEADCHNL_RV_SENSING_CATHODE_ELECTRODE_1: NORMAL
MDC_IDC_SET_LEADCHNL_RV_SENSING_CATHODE_LOCATION_1: NORMAL
MDC_IDC_SET_LEADCHNL_RV_SENSING_POLARITY: NORMAL
MDC_IDC_SET_LEADCHNL_RV_SENSING_SENSITIVITY: 0.45 MV
MDC_IDC_SET_LEADCHNL_RV_SENSING_SENSITIVITY: 0.6 MV
MDC_IDC_SET_LEADCHNL_RV_SENSING_SENSITIVITY: 1.5 MV
MDC_IDC_SET_ZONE_DETECTION_BEATS_DENOMINATOR: 16 {BEATS}
MDC_IDC_SET_ZONE_DETECTION_BEATS_DENOMINATOR: 32 {BEATS}
MDC_IDC_SET_ZONE_DETECTION_BEATS_DENOMINATOR: 40 {BEATS}
MDC_IDC_SET_ZONE_DETECTION_BEATS_NUMERATOR: 16 {BEATS}
MDC_IDC_SET_ZONE_DETECTION_BEATS_NUMERATOR: 30 {BEATS}
MDC_IDC_SET_ZONE_DETECTION_BEATS_NUMERATOR: 32 {BEATS}
MDC_IDC_SET_ZONE_DETECTION_INTERVAL: 240 MS
MDC_IDC_SET_ZONE_DETECTION_INTERVAL: 280 MS
MDC_IDC_SET_ZONE_DETECTION_INTERVAL: 300 MS
MDC_IDC_SET_ZONE_DETECTION_INTERVAL: 330 MS
MDC_IDC_SET_ZONE_DETECTION_INTERVAL: 353 MS
MDC_IDC_SET_ZONE_DETECTION_INTERVAL: 450 MS
MDC_IDC_SET_ZONE_STATUS: NORMAL
MDC_IDC_SET_ZONE_TYPE: NORMAL
MDC_IDC_SET_ZONE_VENDOR_TYPE: NORMAL
MDC_IDC_STAT_AT_BURDEN_PERCENT: 0 %
MDC_IDC_STAT_AT_DTM_END: NORMAL
MDC_IDC_STAT_AT_DTM_START: NORMAL
MDC_IDC_STAT_BRADY_DTM_END: NORMAL
MDC_IDC_STAT_BRADY_DTM_START: NORMAL
MDC_IDC_STAT_BRADY_RV_PERCENT_PACED: 0 %
MDC_IDC_STAT_BRADY_RV_PERCENT_PACED: 0 %
MDC_IDC_STAT_BRADY_RV_PERCENT_PACED: 0.01 %
MDC_IDC_STAT_EPISODE_RECENT_COUNT: 0
MDC_IDC_STAT_EPISODE_RECENT_COUNT: 1
MDC_IDC_STAT_EPISODE_RECENT_COUNT: 12
MDC_IDC_STAT_EPISODE_RECENT_COUNT: 12
MDC_IDC_STAT_EPISODE_RECENT_COUNT: 2
MDC_IDC_STAT_EPISODE_RECENT_COUNT: 3
MDC_IDC_STAT_EPISODE_RECENT_COUNT_DTM_END: NORMAL
MDC_IDC_STAT_EPISODE_RECENT_COUNT_DTM_START: NORMAL
MDC_IDC_STAT_EPISODE_TOTAL_COUNT: 0
MDC_IDC_STAT_EPISODE_TOTAL_COUNT_DTM_END: NORMAL
MDC_IDC_STAT_EPISODE_TOTAL_COUNT_DTM_START: NORMAL
MDC_IDC_STAT_EPISODE_TYPE: NORMAL
MDC_IDC_STAT_EPISODE_VENDOR_TYPE: NORMAL
MDC_IDC_STAT_TACHYTHERAPY_ATP_DELIVERED_RECENT: 0
MDC_IDC_STAT_TACHYTHERAPY_ATP_DELIVERED_RECENT: 0
MDC_IDC_STAT_TACHYTHERAPY_ATP_DELIVERED_RECENT: 1
MDC_IDC_STAT_TACHYTHERAPY_ATP_DELIVERED_TOTAL: 0
MDC_IDC_STAT_TACHYTHERAPY_ATP_DELIVERED_TOTAL: 1
MDC_IDC_STAT_TACHYTHERAPY_ATP_DELIVERED_TOTAL: 2
MDC_IDC_STAT_TACHYTHERAPY_RECENT_DTM_END: NORMAL
MDC_IDC_STAT_TACHYTHERAPY_RECENT_DTM_START: NORMAL
MDC_IDC_STAT_TACHYTHERAPY_SHOCKS_ABORTED_RECENT: 0
MDC_IDC_STAT_TACHYTHERAPY_SHOCKS_ABORTED_TOTAL: 0
MDC_IDC_STAT_TACHYTHERAPY_SHOCKS_DELIVERED_RECENT: 0
MDC_IDC_STAT_TACHYTHERAPY_SHOCKS_DELIVERED_RECENT: 0
MDC_IDC_STAT_TACHYTHERAPY_SHOCKS_DELIVERED_RECENT: 4
MDC_IDC_STAT_TACHYTHERAPY_SHOCKS_DELIVERED_TOTAL: 0
MDC_IDC_STAT_TACHYTHERAPY_SHOCKS_DELIVERED_TOTAL: 0
MDC_IDC_STAT_TACHYTHERAPY_SHOCKS_DELIVERED_TOTAL: 4
MDC_IDC_STAT_TACHYTHERAPY_TOTAL_DTM_END: NORMAL
MDC_IDC_STAT_TACHYTHERAPY_TOTAL_DTM_START: NORMAL

## 2024-09-20 NOTE — PROGRESS NOTES
LVAD Clinic  RABIA follow-up     Akshat Fragoso is a 59 year old male with history of HFrEF 2/2 NICM (diagnosed in 1/2017) s/p ICD placement in 6/2017, VT/VF arrest on 3/15/2023 requiring CPR for 6 mins with cardiogenic shock s/p HM3 LVAD implantation as DT 3/23/2023 c/b postop AF (on amiodarone and digoxin) and HAP, moderate TR s/p TV annuloplasty with 32 mm MC3 partial ring 3/23/2023, PFO closure 3/23/2023, chronic tobacco/marijuana use, COPD, HTN, CKD stage III, who presents today for post LVAD implant follow up.     He was admitted on 3/15/2023 for cardiac arrest in the setting of VT that was slower than his programmed VT threshold for intervention. Patient had been undergoing workup at [facility] for LVAD as destination therapy (initially evaluated for transplant though patient having trouble quitting marijuana/tobacco use). He underwent HM3 LVAD implantation on 3/23/2023 with postop course c/b AF with RVR requiring amiodarone gtt, volume overload, and HAP treated with IV vancomycin and zosyn for 5 days. He was discharged to home on 4/9/2023.     He was admitted from 9/3/24-9/6/24 for an inappropriate ICD shock (he had an RV lead dysfunction with oversensince (microperforation). He underwent lead and battery exchange of his device on 9/5/24. He did get a lisinopril washout and was stared on entresto. Otherwise no significant cardiology medication changes.    Today  He does get some JOHNSON. Can walk about 3-4 blocks -that is improved. No LE edema. No abdominal edema. No orthopnea. No PND. No lightheadedness or dizziness unless he stands up too quick. No pre-syncope or syncope. No palpitations. No chest pain. Appetite is good. No alarms on the lvad.     No blood in the urine or blood in the stool. No prolonged nosebleeds.      No driveline concerns. No fever or chills.     No headaches. No stroke symptoms.      Weight at home has been stable. MAPS have been 70. He had one week where it was 65. Has not had any above 85 at  home.     Current cardiac medications  - Lisinopril 5 mg daily  - Inspra 12.5 m gdaily  - Empagliflozin 10 mg daily  - Bumex 1 mg if weight goes up 3 lbs   - Digoxin 250 mcg daily  - Amiodarone 100 mg daily  - Warfarin (INR goal 2-3)      PAST MEDICAL HISTORY:  Past Medical History:   Diagnosis Date    Acute right lumbar radiculopathy 11/02/2015    Acute systolic heart failure (H) 01/10/2017    Cardiomyopathy (H) 01/10/2017    CKD (chronic kidney disease) stage 3, GFR 30-59 ml/min (H) 01/30/2020    JOHNSON (dyspnea on exertion)     ED (erectile dysfunction) 10/02/2019    Erectile dysfunction     ICD (implantable cardioverter-defibrillator) in place- [employer], single chamber- NOT dependent 10/09/2017    Personal history of smoking 01/04/2017    Pulmonary nodules 01/17/2017    CT 11/2018:  Bilateral pulmonary nodules measuring up to 4 mm. No new or enlarging pulmonary nodules.     FAMILY HISTORY:  Family History   Problem Relation Age of Onset    Parkinsonism Mother     Atrial fibrillation Father     Heart Failure Father     Prostate Cancer Father     Skin Cancer Father     Anorexia nervosa Daughter     Other - See Comments Granddaughter         premature birth     SOCIAL HISTORY:  Social History     Socioeconomic History    Marital status:      Spouse name: Cheryl    Number of children: 2   Occupational History    Occupation: Construction      Employer: S[employer]   Tobacco Use    Smoking status: Former     Packs/day: 0.25     Years: 15.00     Pack years: 3.75     Types: Cigarettes    Smokeless tobacco: Former     Quit date: 3/15/2023   Vaping Use    Vaping status: Never Used   Substance and Sexual Activity    Alcohol use: No     Alcohol/week: 0.0 standard drinks of alcohol    Drug use: No    Sexual activity: Yes     Partners: Female     Birth control/protection: Condom   Other Topics Concern    Parent/sibling w/ CABG, MI or angioplasty before 65F 55M? Yes     Comment: both parents had stints placed       CURRENT MEDICATIONS:  Current Outpatient Medications   Medication Sig Dispense Refill    acetaminophen (TYLENOL) 325 MG tablet Take 2 tablets (650 mg) by mouth every 4 hours as needed for other (For optimal non-opioid multimodal pain management to improve pain control.) 100 tablet 0    amiodarone (PACERONE) 200 MG tablet Take 0.5 tablets (100 mg) by mouth daily. 45 tablet 0    amoxicillin (AMOXIL) 500 MG tablet Take 4 tablets (2,000 mg) by mouth as needed (Take one hour before dental procedure.) 4 tablet 2    bumetanide (BUMEX) 1 MG tablet Take 1 tablet (1 mg) by mouth daily as needed (for weight gain). 45 tablet 3    digoxin (LANOXIN) 250 MCG tablet Take 1 tablet (250 mcg) by mouth daily 30 tablet 11    empagliflozin (JARDIANCE) 10 MG TABS tablet Take 1 tablet (10 mg) by mouth daily 90 tablet 3    eplerenone (INSPRA) 25 MG tablet Take 12.5mg (1/2 tab) daily 45 tablet 3    JANTOVEN ANTICOAGULANT 5 MG tablet Take 0.5-1 tablets (2.5-5 mg) by mouth daily. Take 5 mg on Mondays, Wednesdays, and Fridays and 2.5 mg all other days of the week or as directed by your anticoagulation clinic. 70 tablet 1    lisinopril (ZESTRIL) 5 MG tablet Take 5 mg by mouth daily.      oxyCODONE-acetaminophen (PERCOCET) 5-325 MG tablet Take 1 tablet by mouth every 6 hours as needed for pain. 5 tablet 0    oxyCODONE-acetaminophen (PERCOCET) 5-325 MG tablet Take 1 tablet by mouth every 6 hours as needed for severe pain.      pantoprazole (PROTONIX) 20 MG EC tablet Take 1 tablet (20 mg) by mouth every morning (before breakfast) 90 tablet 3    sildenafil (VIAGRA) 50 MG tablet Take 1 tablet (50 mg) by mouth daily as needed (PRN for sexual activity) 30 tablet 0     No current facility-administered medications for this visit.     ROS:   See HPI     EXAM:  BP (!) 90/0 (BP Location: Right arm, Patient Position: Chair, Cuff Size: Adult Large)   Wt 88.9 kg (195 lb 14.4 oz)   SpO2 97%   BMI 30.68 kg/m    GENERAL: Appears comfortable, in no acute  distress.   HEENT: Eye symmetrical  CV: Hum of Hm3, no adventitious sounds. JVP <6.   RESPIRATORY: Respirations regular, even, and unlabored. Lungs CTA throughout.   GI: Soft  and non distended.   EXTREMITIES: Trace b/l peripheral edema. All extremities are warm and well perfused  NEUROLOGIC: Alert and interacting appropriately. No focal deficits.   SKIN: No jaundice.  Driveline site c/d/i. Daily dressing in place       Labs:  Lab Results   Component Value Date    WBC 12.5 (H) 09/26/2024    HGB 14.6 09/26/2024    HCT 46.7 09/26/2024     09/26/2024     09/26/2024    POTASSIUM 4.8 09/26/2024    CHLORIDE 100 09/26/2024    CO2 30 (H) 09/26/2024    BUN 17.8 09/26/2024    CR 1.29 (H) 09/26/2024     (H) 09/26/2024    DD 3.60 (H) 04/18/2023    NTBNPI 2,071 (H) 09/03/2024    NTBNP 2,877 (H) 09/26/2024    TROPI 0.033 01/09/2017    AST 30 09/26/2024    ALT 28 09/26/2024    ALKPHOS 79 09/26/2024    BILITOTAL 0.4 09/26/2024    INR 2.22 (H) 09/26/2024     Diagnostics:  9/10/24 ICD check  Device: Medtronic [id number] [place] XT VR MRI  Normal Device Function   Mode: VVI 40 bpm  : <0.1%  Intrinsic rhythm: VS 90 bpm  Short V-V intervals: 0  Lead Trends Appear Stable.  Estimated battery longevity to RRT = 14.4 years. Battery voltage = 3.16 V.   Ventricular Arrhythmia: None.  Setting Changes: None.    9/5/24 ECHO  Interpretation Summary  The visual ejection fraction is <20%. Severe diffuse hypokinesis is present.  Dilation of the inferior vena cava is present with normal respiratory  variation in diameter. IVC diameter and respiratory changes fall into an  intermediate range suggesting an RA pressure of 8 mmHg.  No pericardial effusion is present.     This study was compared with the study from 12/27/23 .  No significant changes noted.    4/1/24 RHC  RA: --/--/15 mm Hg  RV: 60/15 mm Hg  PA: 60/30 (43) mm Hg  PCWP: --/--/24 mm Hg  CO/CI: 3.6/1.8 (TD); 3.3/1.7 (Carlos)   PVR: 5.2 (TD) LING   Right sided filling pressures  are moderately elevated. Left sided filling pressures are moderately elevated. Moderately elevated pulmonary artery hypertension. Reduced cardiac output level. Hemodynamic data has been modified in Epic per physician review.        December 27, 2023 echo  Borderline-dilated LV with severely reduced global LV function, LVEF<20%.  LVIDd=5/8 cm.  RV function appears moderately reduced on limited views.  The aortic valve appears to remains closed on limited views. There is no AI.  LVAD inflow cannula is visualized in the LV apex. LVAD outflow graft is  visualized in the aorta. Normal Doppler interrogation of the LVAD inflow  cannula and outflow graft.  This study was compared with the study from 5/3/23: No significant changes  noted.  ___________    8/7/23 RHC  RA 8  RV 39/8  PA 39/15/24  Wedge 7  Cardiac output 4.3, cardiac index 2.3 by thermo  Cardiac output 3,   cardiac index 1.59 by Carlos  PVR: 2 Right sided filling pressures are normal. Left sided filling pressures are normal. Mild elevated pulmonary hypertension. Normal cardiac output level.     Pressures Phase: Baseline     Time Systolic (mmHg) Diastolic (mmHg) Mean (mmHg) A Wave (mmHg) V Wave (mmHg) EDP (mmHg) Max dp/dt (mmHg/sec) HR (bpm) Content (mL/dL) SAT (%)   RA Pressures 12:09 PM   8    9    13      75        RV Pressures 12:15 PM 39        9     74        PA Pressures 12:16 PM 39    15    24        74        PCW Pressures 12:15 PM   7        75        Art Pressures 11:59 AM 99    75    83        77          Blood Flow Results Phase: Baseline     Time Results Indexed Values (L/min/m2)   QP 11:55 AM 3.07 L/min    1.59      QS 11:55 AM 3.07 L/min    1.59        Blood Oximetry Phase: Baseline       Time Hb SAT(%) PO2 Content (mL/dL) PA Sat (%)   PA 11:55 AM  59 %      59      Art 11:55 AM  100 %     19.18              TTE 3/28/2023  Technically difficult study.Extremely poor acoustic windows.  Limited information was obtained during study.  LVAD cannula was  seen in the expected anatomic position in the LV apex.  HM3 at 5200RPM.  Septum normal.  LVIDd 56mm.  Aortic valve not well visualized. No AI.  Normal outflow velocity.  Global right ventricular function is moderately to severely reduced.  Small pericardial effusion with organizing material in pericardial cavity.     TTE 5/3/23:  Please refer to the EPIC report for measurements performed at different LVAD speed settings.  HM3 at 5200RPM at baseline.  LVIDd 43mm.  Septum normal.  Aortic valve remain closed at baseline.     Assessment and Plan:   Akshat Fragoso is a 59 year old male with history of HFrEF 2/2 NICM (diagnosed in 1/2017) s/p ICD placement in 6/2017, VT/VF arrest on 3/15/2023 requiring CPR for 6 mins with cardiogenic shock s/p HM3 LVAD implantation as DT 3/23/2023 c/b postop AF (on amiodarone and digoxin) and HAP, moderate TR s/p TV annuloplasty with 32 mm MC3 partial ring 3/23/2023, PFO closure 3/23/2023, chronic tobacco/marijuana use, COPD, HTN, CKD stage III, who presents today for post LVAD implant follow up.    He is doing well. He does have a leukocytosis today, this is stable and at his baseline. Bps at home have been on the low side although he is high here. He is planning to switch from lisinopril to entresto in the coming weeks so we will defer any other midcations at this time.    # Chronic systolic heart failure secondary to NICM with cardiogenic shock s/p HM III on 3/23/23 LVAD. ACC/AHA Stage D, NYHA Class III  Moderate TR s/p TV annuloplasty with 32 mm MC3 partial ring 3/23/2023.  Has a history of NICM diagnosed in 1/2017 and underwent ICD for primary SCD prophylaxis in 4/2017. Patient had been undergoing workup at [facility] for LVAD as destination therapy (initially evaluated for transplant though patient having trouble quitting marijuana/tobacco use). He had cardiac arrest in the setting of VT that was slower than his programmed VT threshold for intervention on 3/15/2023. Received CPR for 6 mins  with ROSC and developed cardiogenic shock. During hospitalization, he underwent HM3 LVAD implantation on 3/23/2023 with postop course c/b AF with RVR requiring amiodarone gtt, volume overload, and HAP treated with IV vancomycin and zosyn for 5 days. He was discharged to home on 4/9/2023.    Fluid status euvolemic- bumex 1 mg PRN for weight gain and shortness of breath  ACEi/ARB stop lisinopril. After 36 hours can start entresto 12-13 mg BID (these instruction were given at his last visit but he hasn't made this change yet). He knows he will likely need less PRN  BB could consider in the future, deferred given other upcoming changes as above  Aldosterone antagonist Inspra 12.5 mg daily  SCD prophylaxis ICD  NSAID use: contraindicated  Sleep Apnea Evaluation: not discussed today  BP: MAP goal 65-85, above goal at 90 today but home has been all within goal  LDH trends: 235, stable, still establishing b/l  Anticoagulation: warfarin INR goal 2-3, today 2.22  Antiplatelet: Off ASA    VAD Interrogation on September 26, 2024 interrogation reviewed with VAD coordinator. Agree with findings. Occasional PI events. No power spikes, speed drops, or other findings suspicious of pump malfunction noted.       # Persistent lukocytosis.   ID noted no indication for long term antibiotics inpatient given no acute findings on CT while inpatient (he had been treated for HAP with IV antibiotics while inpatient). No signs or symptoms of infection today.  He did have a pre-op luekocytosis longstanding around 11-12, if persistent leukocytosis after the acute post operative period could consider hematology. He is stable at his baseline today  - He knows to call for any new symptoms  - Rehceck with each appointment or sooner if needed for cause    # Afib with RVR, resolved.   - Continue Amiodarone and Digoxin.  - Warfarin with INR goal 2-3    # H/o VT/VF arrest  # Recent ICD shocks, inappropriate d/t RV lead oversensing  - Rechent ICD removal  and reimplant of medtronic system with lead revision.  - Continue precautions as outlined by ep  - ICD checks per protocol    # Moderate TR s/p TV annuloplasty with 32 mm MC3 partial ring 3/23/2023  PFO closure 3/23/2023  - Trend on ECHOs    # Chronic tobacco/marijuana use  - Discussed at last visit: He is more interested in transplant (has previously not been sure if he would want to go through with evaluation and transplant.). He wanted to know next steps and we discussed that he would need to stop cannabis (per current program policy). Advised that he work with other providers to establish effective ancity management plan as he comes off that. He will let us know when he has stopped for 3-4 weeks and then we would begin testing.  Needs to be off tobacco as well.     # HTN.   - MAP within goal pm all home checks (above goal here), continue the above regimen until he makes the change from lisinopril to entresto as above.    Follow-up:  - Labs 2 weeks after switching from lisinopril to entresto  - Dr. Aguiar in 3 months  - VAD RABIA in 6 months     Billing  - I managed 2 stable chronic conditions  - I reviewed 4 tests    The longitudinal plan of care for the diagnosis(es)/condition(s) as documented were addressed during this visit. Due to the added complexity in care, I will continue to support Akshat in the subsequent management and with ongoing continuity of care.    Elba Chen PA-C  Advacned Heart Failure  [facility]

## 2024-09-25 DIAGNOSIS — I50.22 CHRONIC SYSTOLIC HEART FAILURE (H): Primary | ICD-10-CM

## 2024-09-26 ENCOUNTER — LAB (OUTPATIENT)
Dept: LAB | Facility: CLINIC | Age: 59
End: 2024-09-26
Payer: COMMERCIAL

## 2024-09-26 ENCOUNTER — ANTICOAGULATION THERAPY VISIT (OUTPATIENT)
Dept: ANTICOAGULATION | Facility: CLINIC | Age: 59
End: 2024-09-26

## 2024-09-26 ENCOUNTER — OFFICE VISIT (OUTPATIENT)
Dept: CARDIOLOGY | Facility: CLINIC | Age: 59
End: 2024-09-26
Attending: INTERNAL MEDICINE
Payer: COMMERCIAL

## 2024-09-26 VITALS — WEIGHT: 195.9 LBS | BODY MASS INDEX: 30.68 KG/M2 | SYSTOLIC BLOOD PRESSURE: 90 MMHG | OXYGEN SATURATION: 97 %

## 2024-09-26 DIAGNOSIS — Z95.811 LEFT VENTRICULAR ASSIST DEVICE PRESENT (H): Primary | ICD-10-CM

## 2024-09-26 DIAGNOSIS — Z79.899 LONG TERM USE OF DRUG: ICD-10-CM

## 2024-09-26 DIAGNOSIS — Z95.811 LVAD (LEFT VENTRICULAR ASSIST DEVICE) PRESENT (H): ICD-10-CM

## 2024-09-26 DIAGNOSIS — I50.22 CHRONIC SYSTOLIC CONGESTIVE HEART FAILURE (H): ICD-10-CM

## 2024-09-26 DIAGNOSIS — Z95.811 LEFT VENTRICULAR ASSIST DEVICE PRESENT (H): ICD-10-CM

## 2024-09-26 DIAGNOSIS — I50.22 CHRONIC SYSTOLIC HEART FAILURE (H): ICD-10-CM

## 2024-09-26 DIAGNOSIS — I50.23 ACUTE ON CHRONIC SYSTOLIC CONGESTIVE HEART FAILURE (H): Primary | ICD-10-CM

## 2024-09-26 DIAGNOSIS — Z79.01 ANTICOAGULATED ON COUMADIN: ICD-10-CM

## 2024-09-26 LAB
ALBUMIN SERPL BCG-MCNC: 4.2 G/DL (ref 3.5–5.2)
ALP SERPL-CCNC: 79 U/L (ref 40–150)
ALT SERPL W P-5'-P-CCNC: 28 U/L (ref 0–70)
ANION GAP SERPL CALCULATED.3IONS-SCNC: 8 MMOL/L (ref 7–15)
AST SERPL W P-5'-P-CCNC: 30 U/L (ref 0–45)
BILIRUB SERPL-MCNC: 0.4 MG/DL
BUN SERPL-MCNC: 17.8 MG/DL (ref 6–20)
CALCIUM SERPL-MCNC: 9.4 MG/DL (ref 8.8–10.4)
CHLORIDE SERPL-SCNC: 100 MMOL/L (ref 98–107)
CREAT SERPL-MCNC: 1.29 MG/DL (ref 0.67–1.17)
EGFRCR SERPLBLD CKD-EPI 2021: 65 ML/MIN/1.73M2
ERYTHROCYTE [DISTWIDTH] IN BLOOD BY AUTOMATED COUNT: 17.3 % (ref 10–15)
GLUCOSE SERPL-MCNC: 104 MG/DL (ref 70–99)
HCO3 SERPL-SCNC: 30 MMOL/L (ref 22–29)
HCT VFR BLD AUTO: 46.7 % (ref 40–53)
HGB BLD-MCNC: 14.6 G/DL (ref 13.3–17.7)
INR PPP: 2.22 (ref 0.85–1.15)
LDH SERPL L TO P-CCNC: 235 U/L (ref 0–250)
MCH RBC QN AUTO: 27.7 PG (ref 26.5–33)
MCHC RBC AUTO-ENTMCNC: 31.3 G/DL (ref 31.5–36.5)
MCV RBC AUTO: 88 FL (ref 78–100)
NT-PROBNP SERPL-MCNC: 2877 PG/ML (ref 0–900)
PLATELET # BLD AUTO: 214 10E3/UL (ref 150–450)
POTASSIUM SERPL-SCNC: 4.8 MMOL/L (ref 3.4–5.3)
PROT SERPL-MCNC: 6.9 G/DL (ref 6.4–8.3)
RBC # BLD AUTO: 5.28 10E6/UL (ref 4.4–5.9)
SODIUM SERPL-SCNC: 138 MMOL/L (ref 135–145)
WBC # BLD AUTO: 12.5 10E3/UL (ref 4–11)

## 2024-09-26 PROCEDURE — G0463 HOSPITAL OUTPT CLINIC VISIT: HCPCS | Performed by: PHYSICIAN ASSISTANT

## 2024-09-26 PROCEDURE — 83880 ASSAY OF NATRIURETIC PEPTIDE: CPT | Performed by: PATHOLOGY

## 2024-09-26 PROCEDURE — 93750 INTERROGATION VAD IN PERSON: CPT | Performed by: PHYSICIAN ASSISTANT

## 2024-09-26 PROCEDURE — 80053 COMPREHEN METABOLIC PANEL: CPT | Performed by: PATHOLOGY

## 2024-09-26 PROCEDURE — 85610 PROTHROMBIN TIME: CPT | Performed by: PATHOLOGY

## 2024-09-26 PROCEDURE — 85027 COMPLETE CBC AUTOMATED: CPT | Performed by: PATHOLOGY

## 2024-09-26 PROCEDURE — 36415 COLL VENOUS BLD VENIPUNCTURE: CPT | Performed by: PATHOLOGY

## 2024-09-26 PROCEDURE — 83615 LACTATE (LD) (LDH) ENZYME: CPT | Performed by: PATHOLOGY

## 2024-09-26 RX ORDER — BUMETANIDE 1 MG/1
1 TABLET ORAL DAILY PRN
Qty: 45 TABLET | Refills: 3 | Status: SHIPPED | OUTPATIENT
Start: 2024-09-26

## 2024-09-26 ASSESSMENT — PAIN SCALES - GENERAL: PAINLEVEL: NO PAIN (0)

## 2024-09-26 NOTE — PROGRESS NOTES
ANTICOAGULATION MANAGEMENT     Akshat Fragoso 59 year old male is on warfarin with therapeutic INR result. (Goal INR 2.0-3.0)    Recent labs: (last 7 days)     09/26/24  0642   INR 2.22*       ASSESSMENT     Source(s): Chart Review  Previous INR was Therapeutic last 2(+) visits  Medication, diet, health changes since last INR chart reviewed; none identified    I left a detailed voicemail with the orders reflected in flowsheet. I have also requested a call back if there have been any missed doses, concerns, illness, fever, or if there have been any changes in medications, activity level, or diet       PLAN     Recommended plan for no diet, medication or health factor changes affecting INR     Dosing Instructions: Continue your current warfarin dose with next INR in 2 weeks       Summary  As of 9/26/2024      Full warfarin instructions:  5 mg every Mon, Wed, Fri; 2.5 mg all other days   Next INR check:  10/10/2024               Detailed voice message left for Akshat with dosing instructions and follow up date.     Patient to recheck with home meter    Education provided: Please call back if any changes to your diet, medications or how you've been taking warfarin    Plan made per [facility] anticoagulation protocol and per LVAD protocol    Mary Alcantara, RN  9/26/2024  Anticoagulation Clinic  [facility] for routing messages: earnest ANTICOAG LVAD  [facility] patient phone line: [phone]        _______________________________________________________________________     Anticoagulation Episode Summary       Current INR goal:  2.0-3.0   TTR:  88.6% (11.8 mo)   Target end date:  Indefinite   Send INR reminders to:  [facility]    Indications    Acute on chronic systolic congestive heart failure (H) [I50.23]  LVAD (left ventricular assist device) present (H) [Z95.811]  Chronic systolic congestive heart failure (H) [I50.22]  Long term use of drug [Z79.899]  Left ventricular assist device present (H) [Z95.811]  Anticoagulated on Coumadin  [Z79.01]             Comments:  Follow VAD Anticoag protocol:Yes: HeartMate 3   Bridging: Enoxaparin   Date VAD placed: 3/23/23             Anticoagulation Care Providers       Provider Role Specialty Phone number    Kenzie Moreau MD Referring Advanced Heart Failure and Transplant Cardiology [phone]    Mile Bolivar, VERONICA CNP Referring Nurse Practitioner [phone]    Shaun Aguiar MD Referring Cardiovascular Disease [phone]

## 2024-09-26 NOTE — NURSING NOTE
Chief Complaint   Patient presents with    Follow Up     9/26/2024 visit with Elba Chen PA-C for RETURN VAD - (LVM to confirm appt [facility] 9/23), 2 week post hospital stay follow up with labs       Vitals were taken and medications reconciled.    Lex Babb, EMT  7:04 AM

## 2024-09-26 NOTE — LETTER
9/26/2024      RE: Akshat Fragoso  [address]  [facility] [address]       Dear Colleague,    Thank you for the opportunity to participate in the care of your patient, Akshat Fragoso, at the [facility] [place] [facility]. Please see a copy of my visit note below.    LVAD Clinic  RABIA follow-up     Akshat Fragoso is a 59 year old male with history of HFrEF 2/2 NICM (diagnosed in 1/2017) s/p ICD placement in 6/2017, VT/VF arrest on 3/15/2023 requiring CPR for 6 mins with cardiogenic shock s/p HM3 LVAD implantation as DT 3/23/2023 c/b postop AF (on amiodarone and digoxin) and HAP, moderate TR s/p TV annuloplasty with 32 mm MC3 partial ring 3/23/2023, PFO closure 3/23/2023, chronic tobacco/marijuana use, COPD, HTN, CKD stage III, who presents today for post LVAD implant follow up.     He was admitted on 3/15/2023 for cardiac arrest in the setting of VT that was slower than his programmed VT threshold for intervention. Patient had been undergoing workup at [place] for LVAD as destination therapy (initially evaluated for transplant though patient having trouble quitting marijuana/tobacco use). He underwent HM3 LVAD implantation on 3/23/2023 with postop course c/b AF with RVR requiring amiodarone gtt, volume overload, and HAP treated with IV vancomycin and zosyn for 5 days. He was discharged to home on 4/9/2023.     He was admitted from 9/3/24-9/6/24 for an inappropriate ICD shock (he had an RV lead dysfunction with oversensince (microperforation). He underwent lead and battery exchange of his device on 9/5/24. He did get a lisinopril washout and was stared on entresto. Otherwise no significant cardiology medication changes.    Today  He does get some JOHNSON. Can walk about 3-4 blocks -that is improved. No LE edema. No abdominal edema. No orthopnea. No PND. No lightheadedness or dizziness unless he stands up too quick. No pre-syncope or syncope. No  palpitations. No chest pain. Appetite is good. No alarms on the lvad.     No blood in the urine or blood in the stool. No prolonged nosebleeds.      No driveline concerns. No fever or chills.     No headaches. No stroke symptoms.      Weight at home has been stable. MAPS have been 70. He had one week where it was 65. Has not had any above 85 at home.     Current cardiac medications  - Lisinopril 5 mg daily  - Inspra 12.5 m gdaily  - Empagliflozin 10 mg daily  - Bumex 1 mg if weight goes up 3 lbs   - Digoxin 250 mcg daily  - Amiodarone 100 mg daily  - Warfarin (INR goal 2-3)      PAST MEDICAL HISTORY:  Past Medical History:   Diagnosis Date     Acute right lumbar radiculopathy 11/02/2015     Acute systolic heart failure (H) 01/10/2017     Cardiomyopathy (H) 01/10/2017     CKD (chronic kidney disease) stage 3, GFR 30-59 ml/min (H) 01/30/2020     JOHNSON (dyspnea on exertion)      ED (erectile dysfunction) 10/02/2019     Erectile dysfunction      ICD (implantable cardioverter-defibrillator) in place- [employer], single chamber- NOT dependent 10/09/2017     Personal history of smoking 01/04/2017     Pulmonary nodules 01/17/2017    CT 11/2018:  Bilateral pulmonary nodules measuring up to 4 mm. No new or enlarging pulmonary nodules.     FAMILY HISTORY:  Family History   Problem Relation Age of Onset     Parkinsonism Mother      Atrial fibrillation Father      Heart Failure Father      Prostate Cancer Father      Skin Cancer Father      Anorexia nervosa Daughter      Other - See Comments Granddaughter         premature birth     SOCIAL HISTORY:  Social History     Socioeconomic History     Marital status:      Spouse name: Cheryl     Number of children: 2   Occupational History     Occupation: Construction      Employer: SELF   Tobacco Use     Smoking status: Former     Packs/day: 0.25     Years: 15.00     Pack years: 3.75     Types: Cigarettes     Smokeless tobacco: Former     Quit date: 3/15/2023   Vaping  Use     Vaping status: Never Used   Substance and Sexual Activity     Alcohol use: No     Alcohol/week: 0.0 standard drinks of alcohol     Drug use: No     Sexual activity: Yes     Partners: Female     Birth control/protection: Condom   Other Topics Concern     Parent/sibling w/ CABG, MI or angioplasty before 65F 55M? Yes     Comment: both parents had stints placed      CURRENT MEDICATIONS:  Current Outpatient Medications   Medication Sig Dispense Refill     acetaminophen (TYLENOL) 325 MG tablet Take 2 tablets (650 mg) by mouth every 4 hours as needed for other (For optimal non-opioid multimodal pain management to improve pain control.) 100 tablet 0     amiodarone (PACERONE) 200 MG tablet Take 0.5 tablets (100 mg) by mouth daily. 45 tablet 0     amoxicillin (AMOXIL) 500 MG tablet Take 4 tablets (2,000 mg) by mouth as needed (Take one hour before dental procedure.) 4 tablet 2     bumetanide (BUMEX) 1 MG tablet Take 1 tablet (1 mg) by mouth daily as needed (for weight gain). 45 tablet 3     digoxin (LANOXIN) 250 MCG tablet Take 1 tablet (250 mcg) by mouth daily 30 tablet 11     empagliflozin (JARDIANCE) 10 MG TABS tablet Take 1 tablet (10 mg) by mouth daily 90 tablet 3     eplerenone (INSPRA) 25 MG tablet Take 12.5mg (1/2 tab) daily 45 tablet 3     JANTOVEN ANTICOAGULANT 5 MG tablet Take 0.5-1 tablets (2.5-5 mg) by mouth daily. Take 5 mg on Mondays, Wednesdays, and Fridays and 2.5 mg all other days of the week or as directed by your anticoagulation clinic. 70 tablet 1     lisinopril (ZESTRIL) 5 MG tablet Take 5 mg by mouth daily.       oxyCODONE-acetaminophen (PERCOCET) 5-325 MG tablet Take 1 tablet by mouth every 6 hours as needed for pain. 5 tablet 0     oxyCODONE-acetaminophen (PERCOCET) 5-325 MG tablet Take 1 tablet by mouth every 6 hours as needed for severe pain.       pantoprazole (PROTONIX) 20 MG EC tablet Take 1 tablet (20 mg) by mouth every morning (before breakfast) 90 tablet 3     sildenafil (VIAGRA) 50 MG  tablet Take 1 tablet (50 mg) by mouth daily as needed (PRN for sexual activity) 30 tablet 0     No current facility-administered medications for this visit.     ROS:   See HPI     EXAM:  BP (!) 90/0 (BP Location: Right arm, Patient Position: Chair, Cuff Size: Adult Large)   Wt 88.9 kg (195 lb 14.4 oz)   SpO2 97%   BMI 30.68 kg/m    GENERAL: Appears comfortable, in no acute distress.   HEENT: Eye symmetrical  CV: Hum of Hm3, no adventitious sounds. JVP <6.   RESPIRATORY: Respirations regular, even, and unlabored. Lungs CTA throughout.   GI: Soft  and non distended.   EXTREMITIES: Trace b/l peripheral edema. All extremities are warm and well perfused  NEUROLOGIC: Alert and interacting appropriately. No focal deficits.   SKIN: No jaundice.  Driveline site c/d/i. Daily dressing in place       Labs:  Lab Results   Component Value Date    WBC 12.5 (H) 09/26/2024    HGB 14.6 09/26/2024    HCT 46.7 09/26/2024     09/26/2024     09/26/2024    POTASSIUM 4.8 09/26/2024    CHLORIDE 100 09/26/2024    CO2 30 (H) 09/26/2024    BUN 17.8 09/26/2024    CR 1.29 (H) 09/26/2024     (H) 09/26/2024    DD 3.60 (H) 04/18/2023    NTBNPI 2,071 (H) 09/03/2024    NTBNP 2,877 (H) 09/26/2024    TROPI 0.033 01/09/2017    AST 30 09/26/2024    ALT 28 09/26/2024    ALKPHOS 79 09/26/2024    BILITOTAL 0.4 09/26/2024    INR 2.22 (H) 09/26/2024     Diagnostics:  9/10/24 ICD check  Device: Medtronic [id number] [place] XT VR MRI  Normal Device Function   Mode: VVI 40 bpm  : <0.1%  Intrinsic rhythm: VS 90 bpm  Short V-V intervals: 0  Lead Trends Appear Stable.  Estimated battery longevity to RRT = 14.4 years. Battery voltage = 3.16 V.   Ventricular Arrhythmia: None.  Setting Changes: None.    9/5/24 ECHO  Interpretation Summary  The visual ejection fraction is <20%. Severe diffuse hypokinesis is present.  Dilation of the inferior vena cava is present with normal respiratory  variation in diameter. IVC diameter and respiratory changes  fall into an  intermediate range suggesting an RA pressure of 8 mmHg.  No pericardial effusion is present.     This study was compared with the study from 12/27/23 .  No significant changes noted.    4/1/24 RHC  RA: --/--/15 mm Hg  RV: 60/15 mm Hg  PA: 60/30 (43) mm Hg  PCWP: --/--/24 mm Hg  CO/CI: 3.6/1.8 (TD); 3.3/1.7 (Carlos)   PVR: 5.2 (TD) LING   Right sided filling pressures are moderately elevated. Left sided filling pressures are moderately elevated. Moderately elevated pulmonary artery hypertension. Reduced cardiac output level. Hemodynamic data has been modified in Epic per physician review.        December 27, 2023 echo  Borderline-dilated LV with severely reduced global LV function, LVEF<20%.  LVIDd=5/8 cm.  RV function appears moderately reduced on limited views.  The aortic valve appears to remains closed on limited views. There is no AI.  LVAD inflow cannula is visualized in the LV apex. LVAD outflow graft is  visualized in the aorta. Normal Doppler interrogation of the LVAD inflow  cannula and outflow graft.  This study was compared with the study from 5/3/23: No significant changes  noted.  ___________    8/7/23 RHC  RA 8  RV 39/8  PA 39/15/24  Wedge 7  Cardiac output 4.3, cardiac index 2.3 by thermo  Cardiac output 3,   cardiac index 1.59 by Carlos  PVR: 2 Right sided filling pressures are normal. Left sided filling pressures are normal. Mild elevated pulmonary hypertension. Normal cardiac output level.     Pressures Phase: Baseline     Time Systolic (mmHg) Diastolic (mmHg) Mean (mmHg) A Wave (mmHg) V Wave (mmHg) EDP (mmHg) Max dp/dt (mmHg/sec) HR (bpm) Content (mL/dL) SAT (%)   RA Pressures 12:09 PM   8    9    13      75        RV Pressures 12:15 PM 39        9     74        PA Pressures 12:16 PM 39    15    24        74        PCW Pressures 12:15 PM   7        75        Art Pressures 11:59 AM 99    75    83        77          Blood Flow Results Phase: Baseline     Time Results Indexed Values  (L/min/m2)   QP 11:55 AM 3.07 L/min    1.59      QS 11:55 AM 3.07 L/min    1.59        Blood Oximetry Phase: Baseline       Time Hb SAT(%) PO2 Content (mL/dL) PA Sat (%)   PA 11:55 AM  59 %      59      Art 11:55 AM  100 %     19.18              TTE 3/28/2023  Technically difficult study.Extremely poor acoustic windows.  Limited information was obtained during study.  LVAD cannula was seen in the expected anatomic position in the LV apex.  HM3 at 5200RPM.  Septum normal.  LVIDd 56mm.  Aortic valve not well visualized. No AI.  Normal outflow velocity.  Global right ventricular function is moderately to severely reduced.  Small pericardial effusion with organizing material in pericardial cavity.     TTE 5/3/23:  Please refer to the EPIC report for measurements performed at different LVAD speed settings.  HM3 at 5200RPM at baseline.  LVIDd 43mm.  Septum normal.  Aortic valve remain closed at baseline.     Assessment and Plan:   Akshat Fragoso is a 59 year old male with history of HFrEF 2/2 NICM (diagnosed in 1/2017) s/p ICD placement in 6/2017, VT/VF arrest on 3/15/2023 requiring CPR for 6 mins with cardiogenic shock s/p HM3 LVAD implantation as DT 3/23/2023 c/b postop AF (on amiodarone and digoxin) and HAP, moderate TR s/p TV annuloplasty with 32 mm MC3 partial ring 3/23/2023, PFO closure 3/23/2023, chronic tobacco/marijuana use, COPD, HTN, CKD stage III, who presents today for post LVAD implant follow up.    He is doing well. He does have a leukocytosis today, this is stable and at his baseline. Bps at home have been on the low side although he is high here. He is planning to switch from lisinopril to entresto in the coming weeks so we will defer any other midcations at this time.    # Chronic systolic heart failure secondary to NICM with cardiogenic shock s/p HM III on 3/23/23 LVAD. ACC/AHA Stage D, NYHA Class III  Moderate TR s/p TV annuloplasty with 32 mm MC3 partial ring 3/23/2023.  Has a history of NICM diagnosed  in 1/2017 and underwent ICD for primary SCD prophylaxis in 4/2017. Patient had been undergoing workup at [place] for LVAD as destination therapy (initially evaluated for transplant though patient having trouble quitting marijuana/tobacco use). He had cardiac arrest in the setting of VT that was slower than his programmed VT threshold for intervention on 3/15/2023. Received CPR for 6 mins with ROSC and developed cardiogenic shock. During hospitalization, he underwent HM3 LVAD implantation on 3/23/2023 with postop course c/b AF with RVR requiring amiodarone gtt, volume overload, and HAP treated with IV vancomycin and zosyn for 5 days. He was discharged to home on 4/9/2023.    Fluid status euvolemic- bumex 1 mg PRN for weight gain and shortness of breath  ACEi/ARB stop lisinopril. After 36 hours can start entresto 12-13 mg BID (these instruction were given at his last visit but he hasn't made this change yet). He knows he will likely need less PRN  BB could consider in the future, deferred given other upcoming changes as above  Aldosterone antagonist Inspra 12.5 mg daily  SCD prophylaxis ICD  NSAID use: contraindicated  Sleep Apnea Evaluation: not discussed today  BP: MAP goal 65-85, above goal at 90 today but home has been all within goal  LDH trends: 235, stable, still establishing b/l  Anticoagulation: warfarin INR goal 2-3, today 2.22  Antiplatelet: Off ASA    VAD Interrogation on September 26, 2024 interrogation reviewed with VAD coordinator. Agree with findings. Occasional PI events. No power spikes, speed drops, or other findings suspicious of pump malfunction noted.       # Persistent lukocytosis.   ID noted no indication for long term antibiotics inpatient given no acute findings on CT while inpatient (he had been treated for HAP with IV antibiotics while inpatient). No signs or symptoms of infection today.  He did have a pre-op luekocytosis longstanding around 11-12, if persistent leukocytosis after the acute  post operative period could consider hematology. He is stable at his baseline today  - He knows to call for any new symptoms  - Rehceck with each appointment or sooner if needed for cause    # Afib with RVR, resolved.   - Continue Amiodarone and Digoxin.  - Warfarin with INR goal 2-3    # H/o VT/VF arrest  # Recent ICD shocks, inappropriate d/t RV lead oversensing  - Rechent ICD removal and reimplant of medtronic system with lead revision.  - Continue precautions as outlined by ep  - ICD checks per protocol    # Moderate TR s/p TV annuloplasty with 32 mm MC3 partial ring 3/23/2023  PFO closure 3/23/2023  - Trend on ECHOs    # Chronic tobacco/marijuana use  - Discussed at last visit: He is more interested in transplant (has previously not been sure if he would want to go through with evaluation and transplant.). He wanted to know next steps and we discussed that he would need to stop cannabis (per current program policy). Advised that he work with other providers to establish effective ancity management plan as he comes off that. He will let us know when he has stopped for 3-4 weeks and then we would begin testing.  Needs to be off tobacco as well.     # HTN.   - MAP within goal pm all home checks (above goal here), continue the above regimen until he makes the change from lisinopril to entresto as above.    Follow-up:  - Labs 2 weeks after switching from lisinopril to entresto  - Dr. Aguiar in 3 months  - VAD RABIA in 6 months     Billing  - I managed 2 stable chronic conditions  - I reviewed 4 tests    The longitudinal plan of care for the diagnosis(es)/condition(s) as documented were addressed during this visit. Due to the added complexity in care, I will continue to support Akshat in the subsequent management and with ongoing continuity of care.    Elba Chen PA-C  Advacned Heart Failure  [facility]         Please do not hesitate to contact me if you have any questions/concerns.     Sincerely,     Elba  ANJALI Chen

## 2024-09-26 NOTE — NURSING NOTE
MCS VAD Pump Info       Row Name 09/26/24 0700             MCS VAD Information    Implant LVAD      LVAD Pump HeartMate 3         Heartmate 3 LEFT VS    Flow (Lpm) 4.3 Lpm  3.7-4.9      Pulse Index (PI) 3.4 PI  2.0-5.8      Speed (rpm) 5100 rpm      Power (mari) 3.6 mari      Current Hct setting 47      Retired: Unexpected Alarms --         Primary Controller    Serial number [id number]      Low flow alarm setting 2.5      High watt alarm setting --      EBB: Patient use 4      Replace in 32 Months         Backup Controller    Serial number [id number]      EBB: Patient use 4      Replace EBB in 13 Months      Speed & HCT match primary controller --         VAD Interrogation    Alarms reported by patient N      Unexpected alarms noted upon interrogation None      PI events Occasional  hx 6 days minimal speed drops      Damage to equipment is noted N      Action taken Reviewed proper equipment care and maintenance         Driveline Exit Site    Dressing change done N      Driveline properly secured Yes      DLES assessment c/d/i per pt report      Dressing used Daily kit      Frequency patient changes dressing Every other day      Dressing modifications --      Dressing change supplier --                      Education Complete: Yes   Charge the BACKUP controller s backup battery every 6 months  Perform a self test on BACKUP every 6 months  Change the MPU s batteries every 6 months:Yes  Have equipment serviced yearly (if applicable): had it done recently in clinic    Reviewed Heartmate battery maintenance. Hand out given to patient regarding weekly, monthly, and yearly maintenance.

## 2024-09-26 NOTE — PATIENT INSTRUCTIONS
" Ventricular Assist Device Clinic  Take your medications every day, as directed!  Medication changes made today:  Please Take Bumex as needed. Instructions:  When you are done with your Lisinopril, please allow for 36-48 hours before starting Entresto.    Please let us know when you want to start substance testing. Monitor your heart failure, Page the VAD Coordinator on call:  If you gain more than 3 lb in a day or 5 in a week  If you feel worsening shortness of breath, palpitations, or swelling.   If you have VAD alarms or change in parameters  If you feel dizzy or lightheaded     Keep your VAD appointments!    Your next appointment is in 3 months with Crissy, and 6 months with Dr. Aguiar.      Please see the clinic schedulers after your appointment to schedule follow-up. To contact the VAD , call [phone] To Page the VAD Coordinator on call, dial [phone] option #4 and ask to speak to the VAD coordinator on call. Try to maintain a heart healthy lifestyle!  Limit salt & sodium to 2000mg/day   Eat a heart healthy diet, low in saturated fats  Stay active! Aim to move at least 150 minutes every week.    Use [employer] allows you to communicate directly with your heart team through secure messaging.  [employer] can be accessed any time on your phone, computer, or tablet.  If you need assistance, we'd be happy to help!      Equipment Reminders:   Charge your back-up controller at least every 6 months. To charge, connect it to either batteries or wall power. The screen will read \"Charging\". Keep connected until the screen reads \"charging complete\". Disconnect power once the controller battery is charged. Also do a self-test when the controller is connected to power.  Replace the AA batteries in your mobile power unit every 6 months.  Check your battery charger for when it is due for maintenance. It requires inspection in clinic once per year. There will be a sticker stating the month and year " maintenance is due. When you bring your battery charger to clinic, tell one of the schedulers you have LVAD equipment that needs maintenance. They will call [facility]. You can leave your  under the LVAD sign by the  at the far end of clinic. You must drop it off before 2pm.

## 2024-09-29 ENCOUNTER — CARE COORDINATION (OUTPATIENT)
Dept: CARDIOLOGY | Facility: CLINIC | Age: 59
End: 2024-09-29
Payer: COMMERCIAL

## 2024-09-30 NOTE — PROGRESS NOTES
Pt paged this writer as the VAD coordinator on call to discuss concerns regarding VAD parameters. Pt notes that his PI has been fluctuating as he moves, with his highest PI at 6.9. He notes this to resolve to baseline while at rest. Educated patient regarding the normal fluctuation of PI throughout the day, particularly when up and moving. Pt denies any recent weight gain, SOB, edema, or additional heart failure symptoms. Encouraged pt to obtain doppler MAP at home, as higher PI can indicate hypertension. Reassured pt that paging the VAD coordinator on call is encouraged when questions arise, but that fluctuation in parameters is not uncommon. Answered additional questions about speed, flow, and power and when these are potentially abnormal values for patient. Encouraged pt to continue to regularly monitor his baseline VAD parameters, obtain MAP when able, and page again with any additional questions or concerns.

## 2024-10-04 ENCOUNTER — ANTICOAGULATION THERAPY VISIT (OUTPATIENT)
Dept: ANTICOAGULATION | Facility: CLINIC | Age: 59
End: 2024-10-04
Payer: COMMERCIAL

## 2024-10-04 DIAGNOSIS — I50.22 CHRONIC SYSTOLIC CONGESTIVE HEART FAILURE (H): ICD-10-CM

## 2024-10-04 DIAGNOSIS — Z95.811 LEFT VENTRICULAR ASSIST DEVICE PRESENT (H): ICD-10-CM

## 2024-10-04 DIAGNOSIS — Z79.01 ANTICOAGULATED ON COUMADIN: ICD-10-CM

## 2024-10-04 DIAGNOSIS — Z79.899 LONG TERM USE OF DRUG: ICD-10-CM

## 2024-10-04 DIAGNOSIS — I50.23 ACUTE ON CHRONIC SYSTOLIC CONGESTIVE HEART FAILURE (H): Primary | ICD-10-CM

## 2024-10-04 DIAGNOSIS — Z95.811 LVAD (LEFT VENTRICULAR ASSIST DEVICE) PRESENT (H): ICD-10-CM

## 2024-10-04 LAB — INR HOME MONITORING: 2.9 (ref 2–3)

## 2024-10-04 NOTE — PROGRESS NOTES
ANTICOAGULATION MANAGEMENT     Akshat Fragoso 59 year old male is on warfarin with therapeutic INR result. (Goal INR 2.0-3.0)    Recent labs: (last 7 days)     10/04/24  0000   INR 2.9       ASSESSMENT     Source(s): Chart Review  Previous INR was Therapeutic last 2(+) visits  Medication, diet, health changes since last INR chart reviewed; none identified         PLAN     Recommended plan for no diet, medication or health factor changes affecting INR     Dosing Instructions: Continue your current warfarin dose with next INR in 1 week       Summary  As of 10/4/2024      Full warfarin instructions:  5 mg every Mon, Wed, Fri; 2.5 mg all other days   Next INR check:  10/11/2024               Detailed voice message left for Akshat with dosing instructions and follow up date.     Patient to recheck with home meter    Education provided: Please call back if any changes to your diet, medications or how you've been taking warfarin    Plan made per [facility] anticoagulation protocol    Denisse Pillai RN  10/4/2024  Anticoagulation Clinic  [facility] for routing messages: p ANTICOAG LVAD  [facility] patient phone line: [phone]        _______________________________________________________________________     Anticoagulation Episode Summary       Current INR goal:  2.0-3.0   TTR:  88.6% (11.8 mo)   Target end date:  Indefinite   Send INR reminders to:  ANTICOAG LVAD    Indications    Acute on chronic systolic congestive heart failure (H) [I50.23]  LVAD (left ventricular assist device) present (H) [Z95.811]  Chronic systolic congestive heart failure (H) [I50.22]  Long term use of drug [Z79.899]  Left ventricular assist device present (H) [Z95.811]  Anticoagulated on Coumadin [Z79.01]             Comments:  Follow VAD Anticoag protocol:Yes: HeartMate 3   Bridging: Enoxaparin   Date VAD placed: 3/23/23             Anticoagulation Care Providers       Provider Role Specialty Phone number    Kenzie Moreau MD Referring Advanced Heart  Failure and Transplant Cardiology [phone]    Mile Bolivar, VERONICA CNP Referring Nurse Practitioner [phone]    Shaun Aguiar MD Referring Cardiovascular Disease [phone]

## 2024-10-17 ENCOUNTER — ANCILLARY PROCEDURE (OUTPATIENT)
Dept: CARDIOLOGY | Facility: CLINIC | Age: 59
End: 2024-10-17
Attending: INTERNAL MEDICINE
Payer: COMMERCIAL

## 2024-10-17 DIAGNOSIS — I50.22 CHRONIC SYSTOLIC HEART FAILURE (H): ICD-10-CM

## 2024-10-17 DIAGNOSIS — I42.9 CARDIOMYOPATHY (H): ICD-10-CM

## 2024-10-17 DIAGNOSIS — I50.22 CHRONIC SYSTOLIC CONGESTIVE HEART FAILURE (H): Primary | ICD-10-CM

## 2024-10-17 DIAGNOSIS — Z95.810 ICD (IMPLANTABLE CARDIOVERTER-DEFIBRILLATOR) IN PLACE: ICD-10-CM

## 2024-10-17 RX ORDER — LISINOPRIL 5 MG/1
5 TABLET ORAL DAILY
Qty: 30 TABLET | Refills: 0 | Status: SHIPPED | OUTPATIENT
Start: 2024-10-17

## 2024-10-17 NOTE — PROGRESS NOTES
D:  Pt sent [employer] message and called, requesting to delay the Entresto start until he is done hunting and requests a refill of Lisinopril.  Pt is concerned with the potential side effects of Entresto  I:  Discussed with Crissy SMITH.  30 day supply Lisinopril ok'd, no refills.  Pt to inform us when Entresto is initiated.  A:  Lisinopril refill request  P:  Pt verbalized understanding of the instructions given.  Will call VAD coordinator with further needs and questions.

## 2024-10-18 ENCOUNTER — ANTICOAGULATION THERAPY VISIT (OUTPATIENT)
Dept: ANTICOAGULATION | Facility: CLINIC | Age: 59
End: 2024-10-18
Payer: COMMERCIAL

## 2024-10-18 ENCOUNTER — TELEPHONE (OUTPATIENT)
Dept: ANTICOAGULATION | Facility: CLINIC | Age: 59
End: 2024-10-18
Payer: COMMERCIAL

## 2024-10-18 DIAGNOSIS — Z95.811 LVAD (LEFT VENTRICULAR ASSIST DEVICE) PRESENT (H): ICD-10-CM

## 2024-10-18 DIAGNOSIS — I50.23 ACUTE ON CHRONIC SYSTOLIC CONGESTIVE HEART FAILURE (H): Primary | ICD-10-CM

## 2024-10-18 DIAGNOSIS — Z79.899 LONG TERM USE OF DRUG: ICD-10-CM

## 2024-10-18 DIAGNOSIS — Z79.01 ANTICOAGULATED ON COUMADIN: ICD-10-CM

## 2024-10-18 DIAGNOSIS — I50.22 CHRONIC SYSTOLIC CONGESTIVE HEART FAILURE (H): ICD-10-CM

## 2024-10-18 DIAGNOSIS — Z95.811 LEFT VENTRICULAR ASSIST DEVICE PRESENT (H): ICD-10-CM

## 2024-10-18 LAB — INR HOME MONITORING: 2.3 (ref 2–3)

## 2024-10-18 NOTE — PROGRESS NOTES
ANTICOAGULATION MANAGEMENT     Akshat Fragoso 59 year old male is on warfarin with therapeutic INR result. (Goal INR 2.0-3.0)    Recent labs: (last 7 days)     10/18/24  0000   INR 2.3       ASSESSMENT     Source(s): Chart Review and Patient/Caregiver Call     Warfarin doses taken: Warfarin taken as instructed  Diet: No new diet changes identified  Medication/supplement changes: None noted  New illness, injury, or hospitalization: No  Signs or symptoms of bleeding or clotting: No  Previous result: Therapeutic last 2(+) visits  Additional findings: None       PLAN     Recommended plan for no diet, medication or health factor changes affecting INR     Dosing Instructions: Continue your current warfarin dose with next INR in 2 weeks       Summary  As of 10/18/2024      Full warfarin instructions:  5 mg every Mon, Wed, Fri; 2.5 mg all other days   Next INR check:  11/1/2024               Telephone call with Landry who verbalizes understanding and agrees to plan    Patient to recheck with home meter    Education provided: Please call back if any changes to your diet, medications or how you've been taking warfarin  Goal range and lab monitoring: goal range and significance of current result, Importance of therapeutic range, and Importance of following up at instructed interval    Plan made per [facility] anticoagulation protocol and per LVAD protocol    Mitzi Stack RN  10/18/2024  Anticoagulation Clinic  [facility] for routing messages: [employer] ANTICOAG LVAD  [facility] patient phone line: [phone]        _______________________________________________________________________     Anticoagulation Episode Summary       Current INR goal:  2.0-3.0   TTR:  88.6% (11.8 mo)   Target end date:  Indefinite   Send INR reminders to:  ANTICOAG LVAD    Indications    Acute on chronic systolic congestive heart failure (H) [I50.23]  LVAD (left ventricular assist device) present (H) [Z95.811]  Chronic systolic congestive heart failure (H) [I50.22]  Long  term use of drug [Z79.899]  Left ventricular assist device present (H) [Z95.811]  Anticoagulated on Coumadin [Z79.01]             Comments:  Follow VAD Anticoag protocol:Yes: HeartMate 3   Bridging: Enoxaparin   Date VAD placed: 3/23/23             Anticoagulation Care Providers       Provider Role Specialty Phone number    Kenzie Moreau MD Referring Advanced Heart Failure and Transplant Cardiology [phone]    Mile Bolivar, APRN CNP Referring Nurse Practitioner [phone]    Shaun Aguiar MD Referring Cardiovascular Disease [phone]

## 2024-10-18 NOTE — TELEPHONE ENCOUNTER
ANTICOAGULATION     Akshat Fragoso is overdue for an INR check.     Left message reminding patient to check INR with their home meter and call results to the home monitoring company as soon as possible.     Mitzi Stack RN  10/18/2024  Anticoagulation Clinic  [facility] for routing messages: [employer] SOMMER LVAD  [facility] patient phone line: [phone]

## 2024-10-22 LAB
MDC_IDC_EPISODE_DTM: NORMAL
MDC_IDC_EPISODE_DURATION: 1 S
MDC_IDC_EPISODE_DURATION: 1 S
MDC_IDC_EPISODE_DURATION: 3 S
MDC_IDC_EPISODE_ID: 1
MDC_IDC_EPISODE_ID: 2
MDC_IDC_EPISODE_ID: 3
MDC_IDC_EPISODE_TYPE: NORMAL
MDC_IDC_LEAD_CONNECTION_STATUS: NORMAL
MDC_IDC_LEAD_IMPLANT_DT: NORMAL
MDC_IDC_LEAD_LOCATION: NORMAL
MDC_IDC_LEAD_LOCATION_DETAIL_1: NORMAL
MDC_IDC_LEAD_MFG: NORMAL
MDC_IDC_LEAD_MODEL: NORMAL
MDC_IDC_LEAD_POLARITY_TYPE: NORMAL
MDC_IDC_LEAD_SERIAL: NORMAL
MDC_IDC_LEAD_SPECIAL_FUNCTION: NORMAL
MDC_IDC_MSMT_BATTERY_DTM: NORMAL
MDC_IDC_MSMT_BATTERY_REMAINING_LONGEVITY: 170 MO
MDC_IDC_MSMT_BATTERY_RRT_TRIGGER: NORMAL
MDC_IDC_MSMT_BATTERY_STATUS: NORMAL
MDC_IDC_MSMT_BATTERY_VOLTAGE: 3.14 V
MDC_IDC_MSMT_CAP_CHARGE_DTM: NORMAL
MDC_IDC_MSMT_CAP_CHARGE_ENERGY: 18 J
MDC_IDC_MSMT_CAP_CHARGE_TIME: 3.4 S
MDC_IDC_MSMT_CAP_CHARGE_TYPE: NORMAL
MDC_IDC_MSMT_LEADCHNL_RV_IMPEDANCE_VALUE: 285 OHM
MDC_IDC_MSMT_LEADCHNL_RV_IMPEDANCE_VALUE: 399 OHM
MDC_IDC_MSMT_LEADCHNL_RV_PACING_THRESHOLD_AMPLITUDE: 1 V
MDC_IDC_MSMT_LEADCHNL_RV_PACING_THRESHOLD_PULSEWIDTH: 0.4 MS
MDC_IDC_MSMT_LEADCHNL_RV_SENSING_INTR_AMPL: 13 MV
MDC_IDC_PG_IMPLANT_DTM: NORMAL
MDC_IDC_PG_MFG: NORMAL
MDC_IDC_PG_MODEL: NORMAL
MDC_IDC_PG_SERIAL: NORMAL
MDC_IDC_PG_TYPE: NORMAL
MDC_IDC_SESS_CLINIC_NAME: NORMAL
MDC_IDC_SESS_DTM: NORMAL
MDC_IDC_SESS_TYPE: NORMAL
MDC_IDC_SET_BRADY_HYSTRATE: NORMAL
MDC_IDC_SET_BRADY_LOWRATE: 40 {BEATS}/MIN
MDC_IDC_SET_BRADY_MODE: NORMAL
MDC_IDC_SET_LEADCHNL_RV_PACING_AMPLITUDE: 2 V
MDC_IDC_SET_LEADCHNL_RV_PACING_ANODE_ELECTRODE_1: NORMAL
MDC_IDC_SET_LEADCHNL_RV_PACING_ANODE_LOCATION_1: NORMAL
MDC_IDC_SET_LEADCHNL_RV_PACING_CAPTURE_MODE: NORMAL
MDC_IDC_SET_LEADCHNL_RV_PACING_CATHODE_ELECTRODE_1: NORMAL
MDC_IDC_SET_LEADCHNL_RV_PACING_CATHODE_LOCATION_1: NORMAL
MDC_IDC_SET_LEADCHNL_RV_PACING_POLARITY: NORMAL
MDC_IDC_SET_LEADCHNL_RV_PACING_PULSEWIDTH: 0.4 MS
MDC_IDC_SET_LEADCHNL_RV_SENSING_ANODE_ELECTRODE_1: NORMAL
MDC_IDC_SET_LEADCHNL_RV_SENSING_ANODE_LOCATION_1: NORMAL
MDC_IDC_SET_LEADCHNL_RV_SENSING_CATHODE_ELECTRODE_1: NORMAL
MDC_IDC_SET_LEADCHNL_RV_SENSING_CATHODE_LOCATION_1: NORMAL
MDC_IDC_SET_LEADCHNL_RV_SENSING_POLARITY: NORMAL
MDC_IDC_SET_LEADCHNL_RV_SENSING_SENSITIVITY: 0.45 MV
MDC_IDC_SET_ZONE_DETECTION_BEATS_DENOMINATOR: 16 {BEATS}
MDC_IDC_SET_ZONE_DETECTION_BEATS_DENOMINATOR: 32 {BEATS}
MDC_IDC_SET_ZONE_DETECTION_BEATS_DENOMINATOR: 40 {BEATS}
MDC_IDC_SET_ZONE_DETECTION_BEATS_NUMERATOR: 16 {BEATS}
MDC_IDC_SET_ZONE_DETECTION_BEATS_NUMERATOR: 30 {BEATS}
MDC_IDC_SET_ZONE_DETECTION_BEATS_NUMERATOR: 32 {BEATS}
MDC_IDC_SET_ZONE_DETECTION_INTERVAL: 240 MS
MDC_IDC_SET_ZONE_DETECTION_INTERVAL: 280 MS
MDC_IDC_SET_ZONE_DETECTION_INTERVAL: 330 MS
MDC_IDC_SET_ZONE_DETECTION_INTERVAL: 450 MS
MDC_IDC_SET_ZONE_STATUS: NORMAL
MDC_IDC_SET_ZONE_TYPE: NORMAL
MDC_IDC_SET_ZONE_VENDOR_TYPE: NORMAL
MDC_IDC_STAT_AT_BURDEN_PERCENT: 0 %
MDC_IDC_STAT_AT_DTM_END: NORMAL
MDC_IDC_STAT_AT_DTM_START: NORMAL
MDC_IDC_STAT_BRADY_DTM_END: NORMAL
MDC_IDC_STAT_BRADY_DTM_START: NORMAL
MDC_IDC_STAT_BRADY_RA_PERCENT_PACED: NORMAL
MDC_IDC_STAT_BRADY_RV_PERCENT_PACED: 0.14 %
MDC_IDC_STAT_CRT_DTM_END: NORMAL
MDC_IDC_STAT_CRT_DTM_START: NORMAL
MDC_IDC_STAT_EPISODE_RECENT_COUNT: 0
MDC_IDC_STAT_EPISODE_RECENT_COUNT: 3
MDC_IDC_STAT_EPISODE_RECENT_COUNT_DTM_END: NORMAL
MDC_IDC_STAT_EPISODE_RECENT_COUNT_DTM_START: NORMAL
MDC_IDC_STAT_EPISODE_TOTAL_COUNT: 0
MDC_IDC_STAT_EPISODE_TOTAL_COUNT: 3
MDC_IDC_STAT_EPISODE_TOTAL_COUNT_DTM_END: NORMAL
MDC_IDC_STAT_EPISODE_TOTAL_COUNT_DTM_START: NORMAL
MDC_IDC_STAT_EPISODE_TYPE: NORMAL
MDC_IDC_STAT_TACHYTHERAPY_ATP_DELIVERED_RECENT: 0
MDC_IDC_STAT_TACHYTHERAPY_ATP_DELIVERED_TOTAL: 0
MDC_IDC_STAT_TACHYTHERAPY_RECENT_DTM_END: NORMAL
MDC_IDC_STAT_TACHYTHERAPY_RECENT_DTM_START: NORMAL
MDC_IDC_STAT_TACHYTHERAPY_SHOCKS_ABORTED_RECENT: 0
MDC_IDC_STAT_TACHYTHERAPY_SHOCKS_ABORTED_TOTAL: 0
MDC_IDC_STAT_TACHYTHERAPY_SHOCKS_DELIVERED_RECENT: 0
MDC_IDC_STAT_TACHYTHERAPY_SHOCKS_DELIVERED_TOTAL: 0
MDC_IDC_STAT_TACHYTHERAPY_TOTAL_DTM_END: NORMAL
MDC_IDC_STAT_TACHYTHERAPY_TOTAL_DTM_START: NORMAL

## 2024-11-04 ENCOUNTER — MYC MEDICAL ADVICE (OUTPATIENT)
Dept: ANTICOAGULATION | Facility: CLINIC | Age: 59
End: 2024-11-04
Payer: COMMERCIAL

## 2024-11-05 ENCOUNTER — ANTICOAGULATION THERAPY VISIT (OUTPATIENT)
Dept: ANTICOAGULATION | Facility: CLINIC | Age: 59
End: 2024-11-05
Payer: COMMERCIAL

## 2024-11-05 DIAGNOSIS — Z95.811 LVAD (LEFT VENTRICULAR ASSIST DEVICE) PRESENT (H): ICD-10-CM

## 2024-11-05 DIAGNOSIS — I50.23 ACUTE ON CHRONIC SYSTOLIC CONGESTIVE HEART FAILURE (H): Primary | ICD-10-CM

## 2024-11-05 DIAGNOSIS — Z79.899 LONG TERM USE OF DRUG: ICD-10-CM

## 2024-11-05 DIAGNOSIS — Z95.811 LEFT VENTRICULAR ASSIST DEVICE PRESENT (H): ICD-10-CM

## 2024-11-05 DIAGNOSIS — Z79.01 ANTICOAGULATED ON COUMADIN: ICD-10-CM

## 2024-11-05 DIAGNOSIS — I50.22 CHRONIC SYSTOLIC CONGESTIVE HEART FAILURE (H): ICD-10-CM

## 2024-11-05 LAB — INR HOME MONITORING: 2.1 (ref 2–3)

## 2024-11-05 NOTE — PROGRESS NOTES
ANTICOAGULATION MANAGEMENT     Akshat Fragoso 59 year old male is on warfarin with therapeutic INR result. (Goal INR 2.0-3.0)    Recent labs: (last 7 days)     11/05/24  0000   INR 2.1       ASSESSMENT     Source(s): Chart Review  Previous INR was Therapeutic last 2(+) visits  Medication, diet, health changes since last INR chart reviewed; none identified         PLAN     Recommended plan for no diet, medication or health factor changes affecting INR     Dosing Instructions: Continue your current warfarin dose with next INR in 2 weeks       Summary  As of 11/5/2024      Full warfarin instructions:  5 mg every Mon, Wed, Fri; 2.5 mg all other days   Next INR check:  11/19/2024               Detailed voice message left for Akshat with dosing instructions and follow up date.     Patient to recheck with home meter    Education provided: Please call back if any changes to your diet, medications or how you've been taking warfarin    Plan made per [facility] anticoagulation protocol    Denisse Pillai RN  11/5/2024  Anticoagulation Clinic  [facility] for routing messages: p ANTICOAG LVAD  [facility] patient phone line: [phone]        _______________________________________________________________________     Anticoagulation Episode Summary       Current INR goal:  2.0-3.0   TTR:  93.0% (11.8 mo)   Target end date:  Indefinite   Send INR reminders to:  ANTICOAG LVAD    Indications    Acute on chronic systolic congestive heart failure (H) [I50.23]  LVAD (left ventricular assist device) present (H) [Z95.811]  Chronic systolic congestive heart failure (H) [I50.22]  Long term use of drug [Z79.899]  Left ventricular assist device present (H) [Z95.811]  Anticoagulated on Coumadin [Z79.01]             Comments:  Follow VAD Anticoag protocol:Yes: HeartMate 3   Bridging: Enoxaparin   Date VAD placed: 3/23/23             Anticoagulation Care Providers       Provider Role Specialty Phone number    Kenzie Moreau MD Referring Advanced Heart  Failure and Transplant Cardiology [phone]    Mile Bolivar, VERONICA CNP Referring Nurse Practitioner [phone]    Shaun Aguiar MD Referring Cardiovascular Disease [phone]

## 2024-11-18 DIAGNOSIS — Z95.811 LVAD (LEFT VENTRICULAR ASSIST DEVICE) PRESENT (H): ICD-10-CM

## 2024-11-19 NOTE — TELEPHONE ENCOUNTER
amiodarone (PACERONE) 200 MG tablet   Last Written Prescription Date:  9/3/2024  Last Fill Quantity: 45,   # refills: 0  Last Office Visit : 9/6/2024  Future Office visit:  12/11/2024  Routing amiodarone (PACERONE) 200 MG tablet refill request to provider for review/approval because: Medication not on the Cardiology refill protocol.

## 2024-11-20 DIAGNOSIS — I47.20 VENTRICULAR TACHYCARDIA (H): Primary | ICD-10-CM

## 2024-11-20 RX ORDER — AMIODARONE HYDROCHLORIDE 200 MG/1
100 TABLET ORAL DAILY
Qty: 45 TABLET | Refills: 1 | OUTPATIENT
Start: 2024-11-20

## 2024-11-20 RX ORDER — AMIODARONE HYDROCHLORIDE 200 MG/1
100 TABLET ORAL DAILY
Qty: 15 TABLET | Refills: 0 | Status: SHIPPED | OUTPATIENT
Start: 2024-11-20

## 2024-11-21 ENCOUNTER — ANTICOAGULATION THERAPY VISIT (OUTPATIENT)
Dept: ANTICOAGULATION | Facility: CLINIC | Age: 59
End: 2024-11-21
Payer: COMMERCIAL

## 2024-11-21 DIAGNOSIS — I50.22 CHRONIC SYSTOLIC CONGESTIVE HEART FAILURE (H): ICD-10-CM

## 2024-11-21 DIAGNOSIS — Z95.811 LVAD (LEFT VENTRICULAR ASSIST DEVICE) PRESENT (H): ICD-10-CM

## 2024-11-21 DIAGNOSIS — I50.23 ACUTE ON CHRONIC SYSTOLIC CONGESTIVE HEART FAILURE (H): Primary | ICD-10-CM

## 2024-11-21 DIAGNOSIS — I50.22 CHRONIC SYSTOLIC CONGESTIVE HEART FAILURE (H): Primary | ICD-10-CM

## 2024-11-21 DIAGNOSIS — Z79.01 ANTICOAGULATED ON COUMADIN: ICD-10-CM

## 2024-11-21 DIAGNOSIS — Z79.899 LONG TERM USE OF DRUG: ICD-10-CM

## 2024-11-21 DIAGNOSIS — Z95.811 LEFT VENTRICULAR ASSIST DEVICE PRESENT (H): ICD-10-CM

## 2024-11-21 LAB — INR HOME MONITORING: 2.2 (ref 2–3)

## 2024-11-21 NOTE — PROGRESS NOTES
ANTICOAGULATION MANAGEMENT     Akshat Fragoso 59 year old male is on warfarin with therapeutic INR result. (Goal INR 2.0-3.0)    Recent labs: (last 7 days)     11/21/24  0000   INR 2.2       ASSESSMENT     Source(s): Chart Review  Previous INR was Therapeutic last 2(+) visits  Medication, diet, health changes since last INR chart reviewed; none identified         PLAN     Recommended plan for no diet, medication or health factor changes affecting INR     Dosing Instructions: Continue your current warfarin dose with next INR in 2 weeks       Summary  As of 11/21/2024      Full warfarin instructions:  5 mg every Mon, Wed, Fri; 2.5 mg all other days   Next INR check:  12/5/2024               Detailed voice message left for Akshat with dosing instructions and follow up date.   Sent [employer]. message with dosing and follow up instructions    Patient to recheck with home meter    Education provided: Please call back if any changes to your diet, medications or how you've been taking warfarin  Contact [phone] with any changes, questions or concerns.     Plan made per [facility] anticoagulation protocol and per LVAD protocol    Addie Will RN  11/21/2024  Anticoagulation Clinic  [facility] for routing messages: [employer] ANTICOAG LVAD  [facility] patient phone line: [phone]        _______________________________________________________________________     Anticoagulation Episode Summary       Current INR goal:  2.0-3.0   TTR:  94.7% (11.8 mo)   Target end date:  Indefinite   Send INR reminders to:  ANTICOAG LVAD    Indications    Acute on chronic systolic congestive heart failure (H) [I50.23]  LVAD (left ventricular assist device) present (H) [Z95.811]  Chronic systolic congestive heart failure (H) [I50.22]  Long term use of drug [Z79.899]  Left ventricular assist device present (H) [Z95.811]  Anticoagulated on Coumadin [Z79.01]             Comments:  Follow VAD Anticoag protocol:Yes: HeartMate 3   Bridging: Enoxaparin   Date VAD  placed: 3/23/23             Anticoagulation Care Providers       Provider Role Specialty Phone number    Kenzie Moreau MD Referring Advanced Heart Failure and Transplant Cardiology [phone]    Mile Bolivar, APRN CNP Referring Nurse Practitioner [phone]    Shaun Aguiar MD Referring Cardiovascular Disease [phone]

## 2024-11-25 ENCOUNTER — CARE COORDINATION (OUTPATIENT)
Dept: CARDIOLOGY | Facility: CLINIC | Age: 59
End: 2024-11-25
Payer: COMMERCIAL

## 2024-11-25 DIAGNOSIS — I50.22 CHRONIC SYSTOLIC CONGESTIVE HEART FAILURE (H): ICD-10-CM

## 2024-11-25 DIAGNOSIS — Z95.811 LVAD (LEFT VENTRICULAR ASSIST DEVICE) PRESENT (H): Primary | ICD-10-CM

## 2024-11-25 RX ORDER — LISINOPRIL 5 MG/1
5 TABLET ORAL DAILY
Qty: 14 TABLET | Refills: 0 | Status: SHIPPED | OUTPATIENT
Start: 2024-11-25

## 2024-11-25 NOTE — PROGRESS NOTES
Akshat called to request 14 more days prescription of Lisinopril before he will off of it for 36 hours and then start lisinopril as planned. He did not want to make this change over while he is out hunting over the next few days.

## 2024-11-28 NOTE — Clinical Note
Hemodynamic equipment used: 5 lead ECG, Machine BP Cuff and pulse oximeter probe.
LVAD HM 3  RPM 5100  LPM 4.6  PI 2.6  Power 3.6  
Lab results reviewed and abnormals discussed with physician.
Oxygen saturations documented through [facility].
Patient education provided. 
Potential access sites were evaluated for patency using ultrasound.   The right jugular vein was selected. Access was obtained under with Sonosite and Fluoroscopic guidance using a micropuncture 21 gauge needle with direct visualization of needle entry.   
Prepped: right neck. Prepped with: ChloraPrep. The patient was draped. .
Procedure is scheduled in [facility].
Procedure scheduled as Elective.
Removed and manual pressure held until hemostasis obtained
Removed intact
SBAR report given to 2A [facility] [place]
Sheath inserted in the right internal jugular vein.
The ECG shows a sinus rhythm. ECG rate  = 81 bpm.
dry, intact, no bleeding and no hematoma.
no

## 2024-12-05 ENCOUNTER — CARE COORDINATION (OUTPATIENT)
Dept: CARDIOLOGY | Facility: CLINIC | Age: 59
End: 2024-12-05
Payer: COMMERCIAL

## 2024-12-05 DIAGNOSIS — Z79.01 ANTICOAGULATED ON WARFARIN: ICD-10-CM

## 2024-12-05 DIAGNOSIS — I50.22 CHRONIC SYSTOLIC CONGESTIVE HEART FAILURE (H): Primary | ICD-10-CM

## 2024-12-05 DIAGNOSIS — Z95.811 LVAD (LEFT VENTRICULAR ASSIST DEVICE) PRESENT (H): ICD-10-CM

## 2024-12-09 ENCOUNTER — CARE COORDINATION (OUTPATIENT)
Dept: CARDIOLOGY | Facility: CLINIC | Age: 59
End: 2024-12-09
Payer: COMMERCIAL

## 2024-12-09 ENCOUNTER — TELEPHONE (OUTPATIENT)
Dept: ANTICOAGULATION | Facility: CLINIC | Age: 59
End: 2024-12-09
Payer: COMMERCIAL

## 2024-12-09 LAB — INR HOME MONITORING: 2.1 (ref 2–3)

## 2024-12-09 NOTE — TELEPHONE ENCOUNTER
ANTICOAGULATION     Akshat Fragoso is overdue for an INR check.     Left message reminding patient to check INR with their home meter and call results to the home monitoring company as soon as possible.     Golden Lenz RN  12/9/2024  Anticoagulation Clinic  [facility] for routing messages: [employer] SOMMER LVAD  [facility] patient phone line: [phone]

## 2024-12-09 NOTE — PROGRESS NOTES
Patient paged this writer as the VAD coordinator on call to obtain letter for cardiac clearance to proceed with a root canal. Pt notes that he is unable to obtain a root canal today without it and that he has rescheduled his procedure for tomorrow.     Asked patient specifically what the office wants in regards to clearance; patient says that he just needs a note saying that it's okay to undergo procedure on blood thinners.     Will connect with patient's primary VAD coordinator, Rosita Roberson, and patient's primary cardiologist, Dr. Aguiar for next steps in obtaining clearance.

## 2024-12-09 NOTE — LETTER
"         2024    ?     To Whom It May Concern,?     Akshat Fragoso ( 1965) is a mutual patient that needs to have a dental procedure.?Akshat Zavala has a Left Ventricular Assist Device or \"blood pump.\" The device takes over the pumping function of the patient s sick or weakened heart so that the patient s lungs, organs, and tissues get the oxygen-rich blood they need. The left ventricular assist device (LVAD) is a life-sustaining device.? Due to his high risk of thrombus, he must be on Coumadin.?His INR is followed by the [facility] and his INR was 2.1 as of yesterday, 24.     Akshat Fragoso needs to have prophylactic antibiotics prior to any dental exam or procedure.? He is currently taking prophylactic antibiotics as prescribed.    Akshat Fragoso has been given Cardiac Clearance for root canal and any associated procedures. He may receive injectable local anesthetic with epinephrine.?     If you have any further questions or concerns, please reach out to Akshat Fragoso s VAD Coordinator, Rosita Roberson  [phone] or page the VAD coordinator on call with any urgent needs by calling [phone] ext. [phone] and ask the  to page the VAD coordinator on call.      Thank you for your assistance in caring for this patient.?     ?                                "

## 2024-12-10 ENCOUNTER — ANTICOAGULATION THERAPY VISIT (OUTPATIENT)
Dept: ANTICOAGULATION | Facility: CLINIC | Age: 59
End: 2024-12-10
Payer: COMMERCIAL

## 2024-12-10 DIAGNOSIS — Z95.811 LEFT VENTRICULAR ASSIST DEVICE PRESENT (H): ICD-10-CM

## 2024-12-10 DIAGNOSIS — Z95.811 LVAD (LEFT VENTRICULAR ASSIST DEVICE) PRESENT (H): ICD-10-CM

## 2024-12-10 DIAGNOSIS — Z79.01 ANTICOAGULATED ON COUMADIN: ICD-10-CM

## 2024-12-10 DIAGNOSIS — I50.23 ACUTE ON CHRONIC SYSTOLIC CONGESTIVE HEART FAILURE (H): Primary | ICD-10-CM

## 2024-12-10 DIAGNOSIS — I50.22 CHRONIC SYSTOLIC HEART FAILURE (H): Primary | ICD-10-CM

## 2024-12-10 DIAGNOSIS — I50.22 CHRONIC SYSTOLIC CONGESTIVE HEART FAILURE (H): ICD-10-CM

## 2024-12-10 DIAGNOSIS — Z79.899 LONG TERM USE OF DRUG: ICD-10-CM

## 2024-12-10 NOTE — PROGRESS NOTES
ANTICOAGULATION MANAGEMENT     Akshat Fragoso 59 year old male is on warfarin with therapeutic INR result. (Goal INR 2.0-3.0)    Recent labs: (last 7 days)     12/09/24  0000   INR 2.1       ASSESSMENT     Source(s): Chart Review and Patient/Caregiver Call     Warfarin doses taken: Warfarin taken as instructed  Diet: No new diet changes identified  Medication/supplement changes:  Akshat updates that he has been on amoxicillin from his dentist for about a week, he is having a root canal today. He will continue on the amoxi for another week  New illness, injury, or hospitalization: Yes: dental issue  Signs or symptoms of bleeding or clotting: No  Previous result: Therapeutic last 2(+) visits  Additional findings: Upcoming surgery/procedure root canal today, provider requested that his INR be in range for the procedure       PLAN     Recommended plan for temporary change(s) affecting INR     Dosing Instructions: Continue your current warfarin dose with next INR in 1 week       Summary  As of 12/10/2024      Full warfarin instructions:  5 mg every Mon, Wed, Fri; 2.5 mg all other days   Next INR check:  12/16/2024               Telephone call with Akshat who verbalizes understanding and agrees to plan    Patient to recheck with home meter    Education provided: Interaction IS anticipated between warfarin and abx but his INR looks stable after a week of amoxi so will check again in 1 week to ensure stability given dental work, possible dietary disruption, etc  Symptom monitoring: monitoring for bleeding signs and symptoms  Importance of notifying anticoagulation clinic for: changes in medications; a sooner lab recheck maybe needed  Contact [phone] with any changes, questions or concerns.     Plan made per [facility] anticoagulation protocol and per LVAD protocol    Addie Will RN  12/10/2024  Anticoagulation Clinic  [employer] for routing messages: [employer] SOMMER LVAD  [facility] patient phone line:  [phone]        _______________________________________________________________________     Anticoagulation Episode Summary       Current INR goal:  2.0-3.0   TTR:  97.5% (11.8 mo)   Target end date:  Indefinite   Send INR reminders to:  ANTICOAG LVAD    Indications    Acute on chronic systolic congestive heart failure (H) [I50.23]  LVAD (left ventricular assist device) present (H) [Z95.811]  Chronic systolic congestive heart failure (H) [I50.22]  Long term use of drug [Z79.899]  Left ventricular assist device present (H) [Z95.811]  Anticoagulated on Coumadin [Z79.01]             Comments:  Follow VAD Anticoag protocol:Yes: HeartMate 3   Bridging: Enoxaparin   Date VAD placed: 3/23/23             Anticoagulation Care Providers       Provider Role Specialty Phone number    Kenzie Moreau MD Referring Advanced Heart Failure and Transplant Cardiology [phone]    Mile Bolivar, APRN CNP Referring Nurse Practitioner [phone]    Shaun Aguiar MD Referring Cardiovascular Disease [phone]

## 2024-12-10 NOTE — PROGRESS NOTES
LVAD Clinic  RABIA follow-up     Akshat Fragoso is a 59 year old male with history of HFrEF 2/2 NICM (diagnosed in 1/2017) s/p ICD placement in 6/2017, VT/VF arrest on 3/15/2023 requiring CPR for 6 mins with cardiogenic shock s/p HM3 LVAD implantation as DT 3/23/2023 c/b postop AF (on amiodarone and digoxin) and HAP, moderate TR s/p TV annuloplasty with 32 mm MC3 partial ring 3/23/2023, PFO closure 3/23/2023, chronic tobacco/marijuana use, COPD, HTN, CKD stage III, who presents today for post LVAD implant follow up.     He was admitted on 3/15/2023 for cardiac arrest in the setting of VT that was slower than his programmed VT threshold for intervention. Patient had been undergoing workup at [place] for LVAD as destination therapy (initially evaluated for transplant though patient having trouble quitting marijuana/tobacco use). He underwent HM3 LVAD implantation on 3/23/2023 with postop course c/b AF with RVR requiring amiodarone gtt, volume overload, and HAP treated with IV vancomycin and zosyn for 5 days. He was discharged to home on 4/9/2023.     He was admitted from 9/3/24-9/6/24 for an inappropriate ICD shock (he had an RV lead dysfunction with oversensince (microperforation). He underwent lead and battery exchange of his device on 9/5/24. He did get a lisinopril washout and was stared on entresto. Otherwise no significant cardiology medication changes.    Today  He went hunting and walked all over the woods with his backpack and did fine. No JOHNSON. This is the best he has felt in a long time. No LE edema. No abdominal edema. No orthopnea. No PND. No lightheadedness or dizziness unless he stands up too quick. No pre-syncope or syncope. No palpitations. No chest pain. Appetite is good. No alarms on the lvad.     No blood in the urine or blood in the stool. No prolonged nosebleeds.      No driveline concerns. No fever or chills.     No headaches. No stroke symptoms.      Weight at home has been stable 183-185, stable.  MAPS have been high 60s-70s.     Current cardiac medications   - Lisinopril 5 mg daily  - Inspra 12.5 m gdaily  - Empagliflozin 10 mg daily  - Bumex 1 mg if weight goes up 3 lbs - uses 2-3 times per month  - Digoxin 250 mcg daily  - Amiodarone 100 mg daily  - Warfarin (INR goal 2-3)      PAST MEDICAL HISTORY:  Past Medical History:   Diagnosis Date    Acute right lumbar radiculopathy 11/02/2015    Acute systolic heart failure (H) 01/10/2017    Cardiomyopathy (H) 01/10/2017    CKD (chronic kidney disease) stage 3, GFR 30-59 ml/min (H) 01/30/2020    JOHNSON (dyspnea on exertion)     ED (erectile dysfunction) 10/02/2019    Erectile dysfunction     ICD (implantable cardioverter-defibrillator) in place- Adbrain, single chamber- NOT dependent 10/09/2017    Personal history of smoking 01/04/2017    Pulmonary nodules 01/17/2017    CT 11/2018:  Bilateral pulmonary nodules measuring up to 4 mm. No new or enlarging pulmonary nodules.     FAMILY HISTORY:  Family History   Problem Relation Age of Onset    Parkinsonism Mother     Atrial fibrillation Father     Heart Failure Father     Prostate Cancer Father     Skin Cancer Father     Anorexia nervosa Daughter     Other - See Comments Granddaughter         premature birth     SOCIAL HISTORY:  Social History     Socioeconomic History    Marital status:      Spouse name: Cheryl    Number of children: 2   Occupational History    Occupation: Construction      Employer: SELF   Tobacco Use    Smoking status: Former     Packs/day: 0.25     Years: 15.00     Pack years: 3.75     Types: Cigarettes    Smokeless tobacco: Former     Quit date: 3/15/2023   Vaping Use    Vaping status: Never Used   Substance and Sexual Activity    Alcohol use: No     Alcohol/week: 0.0 standard drinks of alcohol    Drug use: No    Sexual activity: Yes     Partners: Female     Birth control/protection: Condom   Other Topics Concern    Parent/sibling w/ CABG, MI or angioplasty before 65F 55M? Yes      Comment: both parents had stints placed      CURRENT MEDICATIONS:  Current Outpatient Medications   Medication Sig Dispense Refill    acetaminophen (TYLENOL) 325 MG tablet Take 2 tablets (650 mg) by mouth every 4 hours as needed for other (For optimal non-opioid multimodal pain management to improve pain control.) 100 tablet 0    amiodarone (PACERONE) 200 MG tablet Take 0.5 tablets (100 mg) by mouth daily. 15 tablet 0    amoxicillin (AMOXIL) 500 MG tablet Take 4 tablets (2,000 mg) by mouth as needed (Take one hour before dental procedure.) 4 tablet 2    bumetanide (BUMEX) 1 MG tablet Take 1 tablet (1 mg) by mouth daily as needed (for weight gain). 45 tablet 3    digoxin (LANOXIN) 250 MCG tablet Take 1 tablet (250 mcg) by mouth daily 30 tablet 11    empagliflozin (JARDIANCE) 10 MG TABS tablet Take 1 tablet (10 mg) by mouth daily 90 tablet 3    eplerenone (INSPRA) 25 MG tablet Take 12.5mg (1/2 tab) daily 45 tablet 3    JANTOVEN ANTICOAGULANT 5 MG tablet Take 0.5-1 tablets (2.5-5 mg) by mouth daily. Take 5 mg on Mondays, Wednesdays, and Fridays and 2.5 mg all other days of the week or as directed by your anticoagulation clinic. 70 tablet 1    lisinopril (ZESTRIL) 5 MG tablet Take 1 tablet (5 mg) by mouth daily. 5 tablet 0    oxyCODONE-acetaminophen (PERCOCET) 5-325 MG tablet Take 1 tablet by mouth every 6 hours as needed for pain. 5 tablet 0    oxyCODONE-acetaminophen (PERCOCET) 5-325 MG tablet Take 1 tablet by mouth every 6 hours as needed for severe pain.      pantoprazole (PROTONIX) 20 MG EC tablet Take 1 tablet (20 mg) by mouth every morning (before breakfast) 90 tablet 3    sildenafil (VIAGRA) 50 MG tablet Take 1 tablet (50 mg) by mouth daily as needed (PRN for sexual activity) 30 tablet 0     No current facility-administered medications for this visit.     ROS:   See HPI     EXAM:  There were no vitals taken for this visit.  GENERAL: Appears comfortable, in no acute distress.   HEENT: Eye symmetrical  CV: Hum of  Hm3, no adventitious sounds. JVP <6.   RESPIRATORY: Respirations regular, even, and unlabored. Lungs CTA throughout.   GI: Soft  and non distended.   EXTREMITIES: Trace b/l peripheral edema. All extremities are warm and well perfused  NEUROLOGIC: Alert and interacting appropriately. No focal deficits.   SKIN: No jaundice.  Driveline site c/d/i. Daily dressing in place       Labs:  Lab Results   Component Value Date    WBC 12.5 (H) 09/26/2024    HGB 14.6 09/26/2024    HCT 46.7 09/26/2024     09/26/2024     09/26/2024    POTASSIUM 4.8 09/26/2024    CHLORIDE 100 09/26/2024    CO2 30 (H) 09/26/2024    BUN 17.8 09/26/2024    CR 1.29 (H) 09/26/2024     (H) 09/26/2024    DD 3.60 (H) 04/18/2023    NTBNPI 2,071 (H) 09/03/2024    NTBNP 2,877 (H) 09/26/2024    TROPI 0.033 01/09/2017    AST 30 09/26/2024    ALT 28 09/26/2024    ALKPHOS 79 09/26/2024    BILITOTAL 0.4 09/26/2024    INR 2.1 12/09/2024     Diagnostics:   10/21/24 ICD  Device: Medtronic [id number] Dolphin XT VR MRI  Normal Device Function  Mode: VVI 40 bpm  : 0.1%  Presenting EGM: VS 80's bpm  Thoracic Impedance:  Near reference line.   Short V-V intervals: 0  Lead Trends Appear Stable.  Estimated battery longevity to RRT = 14.2 years.  Battery voltage = 3.14 V.   Anticoagulant: Jantoven  Ventricular Arrhythmia: 3 NSVT episodes, 188-196 bpm, lasting 1-4 sec.  Pt Notified of Transmission Results: Patient called in because he wasn't feeling right, he was c/o lightheadedness.  Patient was called with the results and was reassured that everything looked good and his ICD was functioning normally.       Plan: Device follow-up every 3 months.  Yumiko Antonio RN    9/5/24 ECHO  Interpretation Summary  The visual ejection fraction is <20%. Severe diffuse hypokinesis is present.  Dilation of the inferior vena cava is present with normal respiratory  variation in diameter. IVC diameter and respiratory changes fall into an  intermediate range suggesting an RA  pressure of 8 mmHg.  No pericardial effusion is present.     This study was compared with the study from 12/27/23 .  No significant changes noted.    4/1/24 RHC  RA: --/--/15 mm Hg  RV: 60/15 mm Hg  PA: 60/30 (43) mm Hg  PCWP: --/--/24 mm Hg  CO/CI: 3.6/1.8 (TD); 3.3/1.7 (Carlos)   PVR: 5.2 (TD) LING   Right sided filling pressures are moderately elevated. Left sided filling pressures are moderately elevated. Moderately elevated pulmonary artery hypertension. Reduced cardiac output level. Hemodynamic data has been modified in Epic per physician review.        December 27, 2023 echo  Borderline-dilated LV with severely reduced global LV function, LVEF<20%.  LVIDd=5/8 cm.  RV function appears moderately reduced on limited views.  The aortic valve appears to remains closed on limited views. There is no AI.  LVAD inflow cannula is visualized in the LV apex. LVAD outflow graft is  visualized in the aorta. Normal Doppler interrogation of the LVAD inflow  cannula and outflow graft.  This study was compared with the study from 5/3/23: No significant changes  noted.  ___________    8/7/23 RHC  RA 8  RV 39/8  PA 39/15/24  Wedge 7  Cardiac output 4.3, cardiac index 2.3 by thermo  Cardiac output 3,   cardiac index 1.59 by Carlos  PVR: 2 Right sided filling pressures are normal. Left sided filling pressures are normal. Mild elevated pulmonary hypertension. Normal cardiac output level.     Pressures Phase: Baseline     Time Systolic (mmHg) Diastolic (mmHg) Mean (mmHg) A Wave (mmHg) V Wave (mmHg) EDP (mmHg) Max dp/dt (mmHg/sec) HR (bpm) Content (mL/dL) SAT (%)   RA Pressures 12:09 PM   8    9    13      75        RV Pressures 12:15 PM 39        9     74        PA Pressures 12:16 PM 39    15    24        74        PCW Pressures 12:15 PM   7        75        Art Pressures 11:59 AM 99    75    83        77          Blood Flow Results Phase: Baseline     Time Results Indexed Values (L/min/m2)   QP 11:55 AM 3.07 L/min    1.59      QS 11:55  "AM 3.07 L/min    1.59        Blood Oximetry Phase: Baseline       Time Hb SAT(%) PO2 Content (mL/dL) PA Sat (%)   PA 11:55 AM  59 %      59      Art 11:55 AM  100 %     19.18              TTE 3/28/2023  Technically difficult study.Extremely poor acoustic windows.  Limited information was obtained during study.  LVAD cannula was seen in the expected anatomic position in the LV apex.  HM3 at 5200RPM.  Septum normal.  LVIDd 56mm.  Aortic valve not well visualized. No AI.  Normal outflow velocity.  Global right ventricular function is moderately to severely reduced.  Small pericardial effusion with organizing material in pericardial cavity.     TTE 5/3/23:  Please refer to the EPIC report for measurements performed at different LVAD speed settings.  HM3 at 5200RPM at baseline.  LVIDd 43mm.  Septum normal.  Aortic valve remain closed at baseline.     Assessment and Plan:   Akshat Fragoso is a 59 year old male with history of HFrEF 2/2 NICM (diagnosed in 1/2017) s/p ICD placement in 6/2017, VT/VF arrest on 3/15/2023 requiring CPR for 6 mins with cardiogenic shock s/p HM3 LVAD implantation as DT 3/23/2023 c/b postop AF (on amiodarone and digoxin) and HAP, moderate TR s/p TV annuloplasty with 32 mm MC3 partial ring 3/23/2023, PFO closure 3/23/2023, chronic tobacco/marijuana use, COPD, HTN, CKD stage III, who presents today for post LVAD implant follow up.    He is doing well. He does have a leukocytosis today which is slightly above his baseline. Likely due to his ongoing dental issues. We discussed \"red flag symptoms\". He is already on amoxicillin. Bps at home are well controlled. He is euvolemic. Otherwise Cr stable, electrolytes stable. BNP at b/l. LDH normal. INR is slightly low.    We are still planning to make the change from lisinopril to entresto when he is ready- we reviewed the washout period today.    # Chronic systolic heart failure secondary to NICM with cardiogenic shock s/p HM III on 3/23/23 LVAD. ACC/AHA " Stage D, NYHA Class III  Moderate TR s/p TV annuloplasty with 32 mm MC3 partial ring 3/23/2023.  Has a history of NICM diagnosed in 1/2017 and underwent ICD for primary SCD prophylaxis in 4/2017. Patient had been undergoing workup at [place] for LVAD as destination therapy (initially evaluated for transplant though patient having trouble quitting marijuana/tobacco use). He had cardiac arrest in the setting of VT that was slower than his programmed VT threshold for intervention on 3/15/2023. Received CPR for 6 mins with ROSC and developed cardiogenic shock. During hospitalization, he underwent HM3 LVAD implantation on 3/23/2023 with postop course c/b AF with RVR requiring amiodarone gtt, volume overload, and HAP treated with IV vancomycin and zosyn for 5 days. He was discharged to home on 4/9/2023.    Fluid status euvolemic- bumex 1 mg PRN for weight gain and shortness of breath- using 2-3 times per month  ACEi/ARB Continue lisinopril 5 mg daily for now. When he wants to make the change to entresto: stop lisinopril. After 36 hours can start entresto 12-13 mg BID (these instruction were given at his last visit but he hasn't made this change yet). He knows he will likely need less PRN bumex  BB could consider in the future, deferred given other upcoming changes as above  Aldosterone antagonist Inspra 12.5 mg daily  SCD prophylaxis ICD  NSAID use: contraindicated  Sleep Apnea Evaluation: not discussed today  BP: MAP goal 65-85, within goal today  LDH trends: 247, stable  Anticoagulation: warfarin INR goal 2-3, today 1.73, dosing per [facility] clinic  Antiplatelet: Off ASA    VAD Interrogation on  December 11, 2024 interrogation reviewed with VAD coordinator. Agree with findings. Occasional PI events. No power spikes, speed drops, or other findings suspicious of pump malfunction noted.       # Persistent lukocytosis.   ID noted no indication for long term antibiotics inpatient given no acute findings on CT while inpatient (he had  been treated for HAP with IV antibiotics while inpatient). No signs or symptoms of infection today.  He did have a pre-op luekocytosis longstanding around 11-12, if persistent leukocytosis after the acute post operative period could consider hematology. He is up to 14 today- likely due to his dental issues.  - Recheck in 1 week  - Continue abx for dental concerns, awaiting definitive treatment with the dentist team    # Afib with RVR, resolved.   - Continue Amiodarone and Digoxin.  - Warfarin with INR goal 2-3, dosing per [facility] clinic      # H/o VT/VF arrest  # Recent ICD shocks, inappropriate d/t RV lead oversensing  - Sees EP next week  - Continue amiodarone 100 mg daily, monitoring per EP  - Recent ICD removal and reimplant of medtronic system with lead revision.  - Continue precautions as outlined by ep  - ICD checks per protocol    # Moderate TR s/p TV annuloplasty with 32 mm MC3 partial ring 3/23/2023  PFO closure 3/23/2023  - Trend on ECHOs    # Chronic tobacco/marijuana use  - Discussed at last visit: He is more interested in transplant (has previously not been sure if he would want to go through with evaluation and transplant.). He wanted to know next steps and we discussed that he would need to stop cannabis (per current program policy). Advised that he work with other providers to establish effective ancity management plan as he comes off that. He will let us know when he has stopped for 3-4 weeks and then we would begin testing.    - Has been off tobacco since Jan 2023  - Still using cannibis, will continue to update with any programatic changes    Follow-up:  -  wbc in 1 week to trend (dental concerns, on abx)  - Dr. Aguiar in 3 months  - VAD RABIA in 6 months     Billing  - I managed 2 stable chronic conditions  - I reviewed 4 tests  - I changed a prescription medicaiton    The longitudinal plan of care for the diagnosis(es)/condition(s) as documented were addressed during this visit. Due to the added  complexity in care, I will continue to support Akshat in the subsequent management and with ongoing continuity of care.    Elba Chen PA-C  Advcarinaned Heart Failure  [facility]

## 2024-12-10 NOTE — PROGRESS NOTES
Spoke with patient to inform him that he had been provided cardiac clearance by Dr. Aguiar for his root canal procedure today. INR was drawn yesterday and was 2.1. Patient endorses already taking prophylactic antibiotics x2 days. Letter sent via [employer] as requested. Pt does not have any further VAD-related questions or concerns.

## 2024-12-11 ENCOUNTER — ANTICOAGULATION THERAPY VISIT (OUTPATIENT)
Dept: ANTICOAGULATION | Facility: CLINIC | Age: 59
End: 2024-12-11

## 2024-12-11 ENCOUNTER — LAB (OUTPATIENT)
Dept: LAB | Facility: CLINIC | Age: 59
End: 2024-12-11
Attending: PHYSICIAN ASSISTANT
Payer: COMMERCIAL

## 2024-12-11 ENCOUNTER — MYC MEDICAL ADVICE (OUTPATIENT)
Dept: ANTICOAGULATION | Facility: CLINIC | Age: 59
End: 2024-12-11

## 2024-12-11 ENCOUNTER — OFFICE VISIT (OUTPATIENT)
Dept: CARDIOLOGY | Facility: CLINIC | Age: 59
End: 2024-12-11
Attending: PHYSICIAN ASSISTANT
Payer: COMMERCIAL

## 2024-12-11 VITALS
OXYGEN SATURATION: 92 % | HEART RATE: 92 BPM | WEIGHT: 195.8 LBS | BODY MASS INDEX: 30.67 KG/M2 | SYSTOLIC BLOOD PRESSURE: 82 MMHG

## 2024-12-11 DIAGNOSIS — Z95.811 LVAD (LEFT VENTRICULAR ASSIST DEVICE) PRESENT (H): ICD-10-CM

## 2024-12-11 DIAGNOSIS — I47.20 VENTRICULAR TACHYCARDIA (H): ICD-10-CM

## 2024-12-11 DIAGNOSIS — Z95.811 LEFT VENTRICULAR ASSIST DEVICE PRESENT (H): ICD-10-CM

## 2024-12-11 DIAGNOSIS — I50.22 CHRONIC SYSTOLIC CONGESTIVE HEART FAILURE (H): ICD-10-CM

## 2024-12-11 DIAGNOSIS — I48.0 PAROXYSMAL ATRIAL FIBRILLATION (H): ICD-10-CM

## 2024-12-11 DIAGNOSIS — Z79.899 ON AMIODARONE THERAPY: ICD-10-CM

## 2024-12-11 DIAGNOSIS — I50.22 CHRONIC SYSTOLIC HEART FAILURE (H): ICD-10-CM

## 2024-12-11 DIAGNOSIS — Z79.01 ANTICOAGULATED ON WARFARIN: ICD-10-CM

## 2024-12-11 DIAGNOSIS — Z95.810 ICD (IMPLANTABLE CARDIOVERTER-DEFIBRILLATOR) IN PLACE: ICD-10-CM

## 2024-12-11 LAB
ALBUMIN SERPL BCG-MCNC: 4.5 G/DL (ref 3.5–5.2)
ALP SERPL-CCNC: 87 U/L (ref 40–150)
ALT SERPL W P-5'-P-CCNC: 22 U/L (ref 0–70)
ANION GAP SERPL CALCULATED.3IONS-SCNC: 12 MMOL/L (ref 7–15)
AST SERPL W P-5'-P-CCNC: 28 U/L (ref 0–45)
BILIRUB DIRECT SERPL-MCNC: <0.2 MG/DL (ref 0–0.3)
BILIRUB SERPL-MCNC: 0.5 MG/DL
BUN SERPL-MCNC: 21.9 MG/DL (ref 6–20)
CALCIUM SERPL-MCNC: 9.4 MG/DL (ref 8.8–10.4)
CHLORIDE SERPL-SCNC: 96 MMOL/L (ref 98–107)
CREAT SERPL-MCNC: 1.19 MG/DL (ref 0.67–1.17)
EGFRCR SERPLBLD CKD-EPI 2021: 71 ML/MIN/1.73M2
ERYTHROCYTE [DISTWIDTH] IN BLOOD BY AUTOMATED COUNT: 15.8 % (ref 10–15)
GLUCOSE SERPL-MCNC: 146 MG/DL (ref 70–99)
HCO3 SERPL-SCNC: 27 MMOL/L (ref 22–29)
HCT VFR BLD AUTO: 47.5 % (ref 40–53)
HGB BLD-MCNC: 14.6 G/DL (ref 13.3–17.7)
INR PPP: 1.73 (ref 0.85–1.15)
LDH SERPL L TO P-CCNC: 247 U/L (ref 0–250)
MCH RBC QN AUTO: 27.6 PG (ref 26.5–33)
MCHC RBC AUTO-ENTMCNC: 30.7 G/DL (ref 31.5–36.5)
MCV RBC AUTO: 90 FL (ref 78–100)
NT-PROBNP SERPL-MCNC: 2687 PG/ML (ref 0–900)
PLATELET # BLD AUTO: 256 10E3/UL (ref 150–450)
POTASSIUM SERPL-SCNC: 4.4 MMOL/L (ref 3.4–5.3)
PROT SERPL-MCNC: 7.7 G/DL (ref 6.4–8.3)
RBC # BLD AUTO: 5.29 10E6/UL (ref 4.4–5.9)
SODIUM SERPL-SCNC: 135 MMOL/L (ref 135–145)
TSH SERPL DL<=0.005 MIU/L-ACNC: 3.63 UIU/ML (ref 0.3–4.2)
WBC # BLD AUTO: 14.2 10E3/UL (ref 4–11)

## 2024-12-11 PROCEDURE — 99000 SPECIMEN HANDLING OFFICE-LAB: CPT | Performed by: PATHOLOGY

## 2024-12-11 PROCEDURE — 83880 ASSAY OF NATRIURETIC PEPTIDE: CPT | Performed by: PATHOLOGY

## 2024-12-11 PROCEDURE — 83615 LACTATE (LD) (LDH) ENZYME: CPT | Performed by: PATHOLOGY

## 2024-12-11 PROCEDURE — 80307 DRUG TEST PRSMV CHEM ANLYZR: CPT | Mod: 90 | Performed by: PATHOLOGY

## 2024-12-11 PROCEDURE — 85610 PROTHROMBIN TIME: CPT | Performed by: PATHOLOGY

## 2024-12-11 PROCEDURE — 93750 INTERROGATION VAD IN PERSON: CPT | Performed by: PHYSICIAN ASSISTANT

## 2024-12-11 PROCEDURE — 80053 COMPREHEN METABOLIC PANEL: CPT | Performed by: PATHOLOGY

## 2024-12-11 PROCEDURE — 36415 COLL VENOUS BLD VENIPUNCTURE: CPT | Performed by: PATHOLOGY

## 2024-12-11 PROCEDURE — 85027 COMPLETE CBC AUTOMATED: CPT | Performed by: PATHOLOGY

## 2024-12-11 PROCEDURE — 82248 BILIRUBIN DIRECT: CPT | Performed by: PATHOLOGY

## 2024-12-11 PROCEDURE — 84443 ASSAY THYROID STIM HORMONE: CPT | Performed by: PATHOLOGY

## 2024-12-11 PROCEDURE — G0463 HOSPITAL OUTPT CLINIC VISIT: HCPCS | Performed by: PHYSICIAN ASSISTANT

## 2024-12-11 PROCEDURE — G0480 DRUG TEST DEF 1-7 CLASSES: HCPCS | Mod: 90 | Performed by: PATHOLOGY

## 2024-12-11 RX ORDER — EPLERENONE 25 MG/1
TABLET, FILM COATED ORAL
Qty: 45 TABLET | Refills: 3 | OUTPATIENT
Start: 2024-12-11

## 2024-12-11 RX ORDER — DIGOXIN 250 MCG
250 TABLET ORAL DAILY
Qty: 90 TABLET | Refills: 3 | Status: SHIPPED | OUTPATIENT
Start: 2024-12-11

## 2024-12-11 RX ORDER — EMPAGLIFLOZIN 10 MG/1
10 TABLET, FILM COATED ORAL DAILY
Qty: 90 TABLET | Refills: 3 | OUTPATIENT
Start: 2024-12-11

## 2024-12-11 RX ORDER — AMIODARONE HYDROCHLORIDE 200 MG/1
100 TABLET ORAL DAILY
Qty: 45 TABLET | Refills: 3 | Status: SHIPPED | OUTPATIENT
Start: 2024-12-11

## 2024-12-11 RX ORDER — PANTOPRAZOLE SODIUM 20 MG/1
20 TABLET, DELAYED RELEASE ORAL
Qty: 90 TABLET | Refills: 3 | OUTPATIENT
Start: 2024-12-11

## 2024-12-11 RX ORDER — AMIODARONE HYDROCHLORIDE 200 MG/1
TABLET ORAL
Qty: 15 TABLET | Refills: 0 | OUTPATIENT
Start: 2024-12-11

## 2024-12-11 RX ORDER — LISINOPRIL 5 MG/1
5 TABLET ORAL DAILY
Qty: 90 TABLET | Refills: 3 | Status: SHIPPED | OUTPATIENT
Start: 2024-12-11

## 2024-12-11 RX ORDER — PANTOPRAZOLE SODIUM 20 MG/1
20 TABLET, DELAYED RELEASE ORAL
Qty: 90 TABLET | Refills: 3 | Status: SHIPPED | OUTPATIENT
Start: 2024-12-11

## 2024-12-11 RX ORDER — EPLERENONE 25 MG/1
TABLET, FILM COATED ORAL
Qty: 45 TABLET | Refills: 3 | Status: SHIPPED | OUTPATIENT
Start: 2024-12-11

## 2024-12-11 RX ORDER — DIGOXIN 250 MCG
250 TABLET ORAL DAILY
Qty: 30 TABLET | Refills: 11 | OUTPATIENT
Start: 2024-12-11

## 2024-12-11 ASSESSMENT — PAIN SCALES - GENERAL: PAINLEVEL_OUTOF10: NO PAIN (0)

## 2024-12-11 NOTE — LETTER
12/11/2024      RE: Akshat Fragoso  [address]  [facility] [address]       Dear Colleague,    Thank you for the opportunity to participate in the care of your patient, Akshat Fragoso, at the [facility] [place] at [facility]. Please see a copy of my visit note below.    LVAD Clinic  RABIA follow-up     Akshat Fragoso is a 59 year old male with history of HFrEF 2/2 NICM (diagnosed in 1/2017) s/p ICD placement in 6/2017, VT/VF arrest on 3/15/2023 requiring CPR for 6 mins with cardiogenic shock s/p HM3 LVAD implantation as DT 3/23/2023 c/b postop AF (on amiodarone and digoxin) and HAP, moderate TR s/p TV annuloplasty with 32 mm MC3 partial ring 3/23/2023, PFO closure 3/23/2023, chronic tobacco/marijuana use, COPD, HTN, CKD stage III, who presents today for post LVAD implant follow up.     He was admitted on 3/15/2023 for cardiac arrest in the setting of VT that was slower than his programmed VT threshold for intervention. Patient had been undergoing workup at [facility] for LVAD as destination therapy (initially evaluated for transplant though patient having trouble quitting marijuana/tobacco use). He underwent HM3 LVAD implantation on 3/23/2023 with postop course c/b AF with RVR requiring amiodarone gtt, volume overload, and HAP treated with IV vancomycin and zosyn for 5 days. He was discharged to home on 4/9/2023.     He was admitted from 9/3/24-9/6/24 for an inappropriate ICD shock (he had an RV lead dysfunction with oversensince (microperforation). He underwent lead and battery exchange of his device on 9/5/24. He did get a lisinopril washout and was stared on entresto. Otherwise no significant cardiology medication changes.    Today  He went hunting and walked all over the woods with his backpack and did fine. No JOHNSON. This is the best he has felt in a long time. No LE edema. No abdominal edema. No orthopnea. No PND. No lightheadedness or dizziness  unless he stands up too quick. No pre-syncope or syncope. No palpitations. No chest pain. Appetite is good. No alarms on the lvad.     No blood in the urine or blood in the stool. No prolonged nosebleeds.      No driveline concerns. No fever or chills.     No headaches. No stroke symptoms.      Weight at home has been stable 183-185, stable. MAPS have been high 60s-70s.     Current cardiac medications   - Lisinopril 5 mg daily  - Inspra 12.5 m gdaily  - Empagliflozin 10 mg daily  - Bumex 1 mg if weight goes up 3 lbs - uses 2-3 times per month  - Digoxin 250 mcg daily  - Amiodarone 100 mg daily  - Warfarin (INR goal 2-3)      PAST MEDICAL HISTORY:  Past Medical History:   Diagnosis Date     Acute right lumbar radiculopathy 11/02/2015     Acute systolic heart failure (H) 01/10/2017     Cardiomyopathy (H) 01/10/2017     CKD (chronic kidney disease) stage 3, GFR 30-59 ml/min (H) 01/30/2020     JOHNSON (dyspnea on exertion)      ED (erectile dysfunction) 10/02/2019     Erectile dysfunction      ICD (implantable cardioverter-defibrillator) in place- [employer], single chamber- NOT dependent 10/09/2017     Personal history of smoking 01/04/2017     Pulmonary nodules 01/17/2017    CT 11/2018:  Bilateral pulmonary nodules measuring up to 4 mm. No new or enlarging pulmonary nodules.     FAMILY HISTORY:  Family History   Problem Relation Age of Onset     Parkinsonism Mother      Atrial fibrillation Father      Heart Failure Father      Prostate Cancer Father      Skin Cancer Father      Anorexia nervosa Daughter      Other - See Comments Granddaughter         premature birth     SOCIAL HISTORY:  Social History     Socioeconomic History     Marital status:      Spouse name: Cheryl     Number of children: 2   Occupational History     Occupation: Construction      Employer: SELF   Tobacco Use     Smoking status: Former     Packs/day: 0.25     Years: 15.00     Pack years: 3.75     Types: Cigarettes     Smokeless  tobacco: Former     Quit date: 3/15/2023   Vaping Use     Vaping status: Never Used   Substance and Sexual Activity     Alcohol use: No     Alcohol/week: 0.0 standard drinks of alcohol     Drug use: No     Sexual activity: Yes     Partners: Female     Birth control/protection: Condom   Other Topics Concern     Parent/sibling w/ CABG, MI or angioplasty before 65F 55M? Yes     Comment: both parents had stints placed      CURRENT MEDICATIONS:  Current Outpatient Medications   Medication Sig Dispense Refill     acetaminophen (TYLENOL) 325 MG tablet Take 2 tablets (650 mg) by mouth every 4 hours as needed for other (For optimal non-opioid multimodal pain management to improve pain control.) 100 tablet 0     amiodarone (PACERONE) 200 MG tablet Take 0.5 tablets (100 mg) by mouth daily. 15 tablet 0     amoxicillin (AMOXIL) 500 MG tablet Take 4 tablets (2,000 mg) by mouth as needed (Take one hour before dental procedure.) 4 tablet 2     bumetanide (BUMEX) 1 MG tablet Take 1 tablet (1 mg) by mouth daily as needed (for weight gain). 45 tablet 3     digoxin (LANOXIN) 250 MCG tablet Take 1 tablet (250 mcg) by mouth daily 30 tablet 11     empagliflozin (JARDIANCE) 10 MG TABS tablet Take 1 tablet (10 mg) by mouth daily 90 tablet 3     eplerenone (INSPRA) 25 MG tablet Take 12.5mg (1/2 tab) daily 45 tablet 3     JANTOVEN ANTICOAGULANT 5 MG tablet Take 0.5-1 tablets (2.5-5 mg) by mouth daily. Take 5 mg on Mondays, Wednesdays, and Fridays and 2.5 mg all other days of the week or as directed by your anticoagulation clinic. 70 tablet 1     lisinopril (ZESTRIL) 5 MG tablet Take 1 tablet (5 mg) by mouth daily. 5 tablet 0     oxyCODONE-acetaminophen (PERCOCET) 5-325 MG tablet Take 1 tablet by mouth every 6 hours as needed for pain. 5 tablet 0     oxyCODONE-acetaminophen (PERCOCET) 5-325 MG tablet Take 1 tablet by mouth every 6 hours as needed for severe pain.       pantoprazole (PROTONIX) 20 MG EC tablet Take 1 tablet (20 mg) by mouth  every morning (before breakfast) 90 tablet 3     sildenafil (VIAGRA) 50 MG tablet Take 1 tablet (50 mg) by mouth daily as needed (PRN for sexual activity) 30 tablet 0     No current facility-administered medications for this visit.     ROS:   See HPI     EXAM:  There were no vitals taken for this visit.  GENERAL: Appears comfortable, in no acute distress.   HEENT: Eye symmetrical  CV: Hum of Hm3, no adventitious sounds. JVP <6.   RESPIRATORY: Respirations regular, even, and unlabored. Lungs CTA throughout.   GI: Soft  and non distended.   EXTREMITIES: Trace b/l peripheral edema. All extremities are warm and well perfused  NEUROLOGIC: Alert and interacting appropriately. No focal deficits.   SKIN: No jaundice.  Driveline site c/d/i. Daily dressing in place       Labs:  Lab Results   Component Value Date    WBC 12.5 (H) 09/26/2024    HGB 14.6 09/26/2024    HCT 46.7 09/26/2024     09/26/2024     09/26/2024    POTASSIUM 4.8 09/26/2024    CHLORIDE 100 09/26/2024    CO2 30 (H) 09/26/2024    BUN 17.8 09/26/2024    CR 1.29 (H) 09/26/2024     (H) 09/26/2024    DD 3.60 (H) 04/18/2023    NTBNPI 2,071 (H) 09/03/2024    NTBNP 2,877 (H) 09/26/2024    TROPI 0.033 01/09/2017    AST 30 09/26/2024    ALT 28 09/26/2024    ALKPHOS 79 09/26/2024    BILITOTAL 0.4 09/26/2024    INR 2.1 12/09/2024     Diagnostics:   10/21/24 ICD  Device: Medtronic [id number] [place] XT VR MRI  Normal Device Function  Mode: VVI 40 bpm  : 0.1%  Presenting EGM: VS 80's bpm  Thoracic Impedance:  Near reference line.   Short V-V intervals: 0  Lead Trends Appear Stable.  Estimated battery longevity to RRT = 14.2 years.  Battery voltage = 3.14 V.   Anticoagulant: Jantoven  Ventricular Arrhythmia: 3 NSVT episodes, 188-196 bpm, lasting 1-4 sec.  Pt Notified of Transmission Results: Patient called in because he wasn't feeling right, he was c/o lightheadedness.  Patient was called with the results and was reassured that everything looked good and  his ICD was functioning normally.       Plan: Device follow-up every 3 months.  Yumiko Antonio RN    9/5/24 ECHO  Interpretation Summary  The visual ejection fraction is <20%. Severe diffuse hypokinesis is present.  Dilation of the inferior vena cava is present with normal respiratory  variation in diameter. IVC diameter and respiratory changes fall into an  intermediate range suggesting an RA pressure of 8 mmHg.  No pericardial effusion is present.     This study was compared with the study from 12/27/23 .  No significant changes noted.    4/1/24 RHC  RA: --/--/15 mm Hg  RV: 60/15 mm Hg  PA: 60/30 (43) mm Hg  PCWP: --/--/24 mm Hg  CO/CI: 3.6/1.8 (TD); 3.3/1.7 (Carlos)   PVR: 5.2 (TD) LING   Right sided filling pressures are moderately elevated. Left sided filling pressures are moderately elevated. Moderately elevated pulmonary artery hypertension. Reduced cardiac output level. Hemodynamic data has been modified in [facility] per physician review.        December 27, 2023 echo  Borderline-dilated LV with severely reduced global LV function, LVEF<20%.  LVIDd=5/8 cm.  RV function appears moderately reduced on limited views.  The aortic valve appears to remains closed on limited views. There is no AI.  LVAD inflow cannula is visualized in the LV apex. LVAD outflow graft is  visualized in the aorta. Normal Doppler interrogation of the LVAD inflow  cannula and outflow graft.  This study was compared with the study from 5/3/23: No significant changes  noted.  ___________    8/7/23 RHC  RA 8  RV 39/8  PA 39/15/24  Wedge 7  Cardiac output 4.3, cardiac index 2.3 by thermo  Cardiac output 3,   cardiac index 1.59 by Carlos  PVR: 2 Right sided filling pressures are normal. Left sided filling pressures are normal. Mild elevated pulmonary hypertension. Normal cardiac output level.     Pressures Phase: Baseline     Time Systolic (mmHg) Diastolic (mmHg) Mean (mmHg) A Wave (mmHg) V Wave (mmHg) EDP (mmHg) Max dp/dt (mmHg/sec) HR (bpm) Content  "(mL/dL) SAT (%)   RA Pressures 12:09 PM   8    9    13      75        RV Pressures 12:15 PM 39        9     74        PA Pressures 12:16 PM 39    15    24        74        PCW Pressures 12:15 PM   7        75        Art Pressures 11:59 AM 99    75    83        77          Blood Flow Results Phase: Baseline     Time Results Indexed Values (L/min/m2)   QP 11:55 AM 3.07 L/min    1.59      QS 11:55 AM 3.07 L/min    1.59        Blood Oximetry Phase: Baseline       Time Hb SAT(%) PO2 Content (mL/dL) PA Sat (%)   PA 11:55 AM  59 %      59      Art 11:55 AM  100 %     19.18              TTE 3/28/2023  Technically difficult study.Extremely poor acoustic windows.  Limited information was obtained during study.  LVAD cannula was seen in the expected anatomic position in the LV apex.  HM3 at 5200RPM.  Septum normal.  LVIDd 56mm.  Aortic valve not well visualized. No AI.  Normal outflow velocity.  Global right ventricular function is moderately to severely reduced.  Small pericardial effusion with organizing material in pericardial cavity.     TTE 5/3/23:  Please refer to the EPIC report for measurements performed at different LVAD speed settings.  HM3 at 5200RPM at baseline.  LVIDd 43mm.  Septum normal.  Aortic valve remain closed at baseline.     Assessment and Plan:   Akshat Fragoso is a 59 year old male with history of HFrEF 2/2 NICM (diagnosed in 1/2017) s/p ICD placement in 6/2017, VT/VF arrest on 3/15/2023 requiring CPR for 6 mins with cardiogenic shock s/p HM3 LVAD implantation as DT 3/23/2023 c/b postop AF (on amiodarone and digoxin) and HAP, moderate TR s/p TV annuloplasty with 32 mm MC3 partial ring 3/23/2023, PFO closure 3/23/2023, chronic tobacco/marijuana use, COPD, HTN, CKD stage III, who presents today for post LVAD implant follow up.    He is doing well. He does have a leukocytosis today which is slightly above his baseline. Likely due to his ongoing dental issues. We discussed \"red flag symptoms\". He is already " on amoxicillin. Bps at home are well controlled. He is euvolemic. Otherwise Cr stable, electrolytes stable. BNP at b/l. LDH normal. INR is slightly low.    We are still planning to make the change from lisinopril to entresto when he is ready- we reviewed the washout period today.    # Chronic systolic heart failure secondary to NICM with cardiogenic shock s/p HM III on 3/23/23 LVAD. ACC/AHA Stage D, NYHA Class III  Moderate TR s/p TV annuloplasty with 32 mm MC3 partial ring 3/23/2023.  Has a history of NICM diagnosed in 1/2017 and underwent ICD for primary SCD prophylaxis in 4/2017. Patient had been undergoing workup at [facility] for LVAD as destination therapy (initially evaluated for transplant though patient having trouble quitting marijuana/tobacco use). He had cardiac arrest in the setting of VT that was slower than his programmed VT threshold for intervention on 3/15/2023. Received CPR for 6 mins with ROSC and developed cardiogenic shock. During hospitalization, he underwent HM3 LVAD implantation on 3/23/2023 with postop course c/b AF with RVR requiring amiodarone gtt, volume overload, and HAP treated with IV vancomycin and zosyn for 5 days. He was discharged to home on 4/9/2023.    Fluid status euvolemic- bumex 1 mg PRN for weight gain and shortness of breath- using 2-3 times per month  ACEi/ARB Continue lisinopril 5 mg daily for now. When he wants to make the change to entresto: stop lisinopril. After 36 hours can start entresto 12-13 mg BID (these instruction were given at his last visit but he hasn't made this change yet). He knows he will likely need less PRN bumex  BB could consider in the future, deferred given other upcoming changes as above  Aldosterone antagonist Inspra 12.5 mg daily  SCD prophylaxis ICD  NSAID use: contraindicated  Sleep Apnea Evaluation: not discussed today  BP: MAP goal 65-85, within goal today  LDH trends: 247, stable  Anticoagulation: warfarin INR goal 2-3, today 1.73, dosing per [facility]  clinic  Antiplatelet: Off ASA    VAD Interrogation on  December 11, 2024 interrogation reviewed with VAD coordinator. Agree with findings. Occasional PI events. No power spikes, speed drops, or other findings suspicious of pump malfunction noted.       # Persistent lukocytosis.   ID noted no indication for long term antibiotics inpatient given no acute findings on CT while inpatient (he had been treated for HAP with IV antibiotics while inpatient). No signs or symptoms of infection today.  He did have a pre-op luekocytosis longstanding around 11-12, if persistent leukocytosis after the acute post operative period could consider hematology. He is up to 14 today- likely due to his dental issues.  - Recheck in 1 week  - Continue abx for dental concerns, awaiting definitive treatment with the dentist team    # Afib with RVR, resolved.   - Continue Amiodarone and Digoxin.  - Warfarin with INR goal 2-3, dosing per [facility] clinic      # H/o VT/VF arrest  # Recent ICD shocks, inappropriate d/t RV lead oversensing  - Sees EP next week  - Continue amiodarone 100 mg daily, monitoring per EP  - Recent ICD removal and reimplant of medtronic system with lead revision.  - Continue precautions as outlined by ep  - ICD checks per protocol    # Moderate TR s/p TV annuloplasty with 32 mm MC3 partial ring 3/23/2023  PFO closure 3/23/2023  - Trend on ECHOs    # Chronic tobacco/marijuana use  - Discussed at last visit: He is more interested in transplant (has previously not been sure if he would want to go through with evaluation and transplant.). He wanted to know next steps and we discussed that he would need to stop cannabis (per current program policy). Advised that he work with other providers to establish effective ancity management plan as he comes off that. He will let us know when he has stopped for 3-4 weeks and then we would begin testing.    - Has been off tobacco since Jan 2023  - Still using cannibis, will continue to update  with any programatic changes    Follow-up:  -  wbc in 1 week to trend (dental concerns, on abx)  - Dr. Aguiar in 3 months  - VAD RABIA in 6 months     Billing  - I managed 2 stable chronic conditions  - I reviewed 4 tests  - I changed a prescription medicaiton    The longitudinal plan of care for the diagnosis(es)/condition(s) as documented were addressed during this visit. Due to the added complexity in care, I will continue to support Akshat in the subsequent management and with ongoing continuity of care.    Elba Chen PA-C  Advacned Heart Failure  [facility]         Please do not hesitate to contact me if you have any questions/concerns.     Sincerely,     Elba Chen PA-C

## 2024-12-11 NOTE — PROGRESS NOTES
ANTICOAGULATION MANAGEMENT     Akshat Fragoso 59 year old male is on warfarin with subtherapeutic INR result. (Goal INR 2.0-3.0)    Recent labs: (last 7 days)     12/11/24  0739   INR 1.73*       ASSESSMENT     Source(s): Chart Review  Previous INR was Therapeutic last 2(+) visits  Pt had a root canal on 12/10/24 and is on amoxicillin for another week. Pt saw cardiology today and a venous draw was done. The INR is subtherapeutic today compared to the INR done POCT with home meter 2 days ago on 12/9/24. Recommend that pt recheck INR again at lab appointment on 12/18/24 and consider checking a venous and POCT for correlation. Also, amoxicillin can sometimes lower INR.         PLAN     Dosing Instructions: booster dose then continue your current warfarin dose with next INR in 1 week       Summary  As of 12/11/2024      Full warfarin instructions:  12/11: 7.5 mg; Otherwise 5 mg every Mon, Wed, Fri; 2.5 mg all other days   Next INR check:  12/18/2024               Detailed voice message left for Akshat with dosing instructions and follow up date.   Sent [employer] message with dosing and follow up instructions    Lab visit scheduled    Education provided: Please call back if any changes to your diet, medications or how you've been taking warfarin  Contact [phone] with any changes, questions or concerns.     Plan made per [facility] anticoagulation protocol and per LVAD protocol    Marcia Park RN  12/11/2024  Anticoagulation Clinic  [facility] for routing messages: p ANTICOAG LVAD  [facility] patient phone line: [phone]        _______________________________________________________________________     Anticoagulation Episode Summary       Current INR goal:  2.0-3.0   TTR:  97.1% (11.8 mo)   Target end date:  Indefinite   Send INR reminders to:  ANTICOAG LVAD    Indications    Acute on chronic systolic congestive heart failure (H) [I50.23]  LVAD (left ventricular assist device) present (H) [Z95.811]  Chronic systolic congestive  heart failure (H) [I50.22]  Long term use of drug [Z79.899]  Left ventricular assist device present (H) [Z95.811]  Anticoagulated on Coumadin [Z79.01]             Comments:  Follow VAD Anticoag protocol:Yes: HeartMate 3   Bridging: Enoxaparin   Date VAD placed: 3/23/23             Anticoagulation Care Providers       Provider Role Specialty Phone number    Kenzie Moreau MD Referring Advanced Heart Failure and Transplant Cardiology [phone]    Mile Bolivar, APRN CNP Referring Nurse Practitioner [phone]    Shaun Aguiar MD Referring Cardiovascular Disease [phone]

## 2024-12-11 NOTE — NURSING NOTE
MCS VAD Pump Info       Row Name 12/11/24 0840             MCS VAD Information    Implant LVAD      LVAD Pump HeartMate 3         Heartmate 3 LEFT VS    Flow (Lpm) 4.6 Lpm      Pulse Index (PI) 3 PI  Range 2.1-6.3      Speed (rpm) 5100 rpm      Power (mari) 3.7 mari      Current Hct setting 48      Retired: Unexpected Alarms --         Primary Controller    Serial number [id number]      Low flow alarm setting 2.5      High watt alarm setting --      EBB: Patient use 4      Replace in 29 Months         Backup Controller    Serial number [id number]      EBB: Patient use 4      Replace EBB in 10 Months      Speed & HCT match primary controller --         VAD Interrogation    Alarms reported by patient N      Unexpected alarms noted upon interrogation None      PI events Occasional  hx goes back 10 days      Damage to equipment is noted N      Action taken --         Driveline Exit Site    Dressing change done N      Driveline properly secured Yes      DLES assessment c/d/i per pt report      Dressing used Daily kit      Frequency patient changes dressing Daily      Dressing modifications --      Dressing change supplier --                      Education Complete: Yes   Charge the BACKUP controller s backup battery every 6 months  Perform a self test on BACKUP every 6 months  Change the MPU s batteries every 6 months:Yes  Have equipment serviced yearly (if applicable):Yes

## 2024-12-11 NOTE — PATIENT INSTRUCTIONS
" Ventricular Assist Device Clinic  Take your medications every day, as directed!  Medication changes made today:  NO changes to medications today  Let us know when you are ready to start Entresto  Instructions:  Schedule your 6 month appt with RABIA  Get WBC lab drawn in 1 week    Monitor your heart failure, Page the VAD Coordinator on call:  If you gain more than 3 lb in a day or 5 in a week  If you feel worsening shortness of breath, palpitations, or swelling.   If you have VAD alarms or change in parameters  If you feel dizzy or lightheaded     Keep your VAD appointments!    Your next appointment is March 4th, 2025 with Dr Aguiar      Please see the clinic schedulers after your appointment to schedule follow-up.    If you do not have an appointment scheduled, you need to call the VAD  at [phone]. To Page the VAD Coordinator on call, dial [phone] option #4 and ask to speak to the VAD coordinator on call. Try to maintain a heart healthy lifestyle!  Limit salt & sodium to 2000mg/day   Eat a heart healthy diet, low in saturated fats  Stay active! Aim to move at least 150 minutes every week.    Use [employer] allows you to communicate directly with your heart team through secure messaging.  [email or web address] can be accessed any time on your phone, computer, or tablet.  If you need assistance, we'd be happy to help!      Equipment Reminders:   Charge your back-up controller at least every 6 months. To charge, connect it to either batteries or wall power. The screen will read \"Charging\". Keep connected until the screen reads \"charging complete\". Disconnect power once the controller battery is charged. Also do a self-test when the controller is connected to power.  Replace the AA batteries in your mobile power unit every 6 months.  Check your battery charger for when it is due for maintenance. It requires inspection in clinic once per year. There will be a sticker stating the month and year maintenance is " due. When you bring your battery charger to clinic, tell one of the schedulers you have LVAD equipment that needs maintenance. They will call [employer]. You can leave your  under the LVAD sign by the  at the far end of clinic. You must drop it off before 2pm.

## 2024-12-11 NOTE — NURSING NOTE
Chief Complaint   Patient presents with    Follow Up     Return VAD       Vitals were take, medications reconciled     Kirk Boogie, EMT    8:22 AM

## 2024-12-14 LAB
AMPHETAMINES SERPL QL SCN: NEGATIVE NG/ML
ANNOTATION COMMENT IMP: NORMAL
BARBITURATES SERPL QL SCN: NEGATIVE NG/ML
BENZODIAZ SERPL QL SCN: NEGATIVE NG/ML
BUPRENORPHINE SERPL-MCNC: NEGATIVE NG/ML
CANNABINOIDS SERPL QL SCN: POSITIVE NG/ML
COCAINE SERPL QL SCN: NEGATIVE NG/ML
COTININE SERPL-MCNC: <5 NG/ML
METHADONE SERPL QL SCN: NEGATIVE NG/ML
METHAMPHET SERPL QL: NEGATIVE NG/ML
NICOTINE SERPL-MCNC: <5 NG/ML
OPIATES SERPL QL SCN: NEGATIVE NG/ML
OXYCODONE SERPL QL: POSITIVE NG/ML
PCP SERPL QL SCN: NEGATIVE NG/ML

## 2024-12-16 NOTE — PROGRESS NOTES
ELECTROPHYSIOLOGY CLINIC VISIT    Assessment/Recommendations   Assessment/Plan:    Mr. Fragoso is a 59 year old medical history significant for NICM LVEF 12%, VT/VF arrest on 3/15/2023, s/p DT LVAD 3/23/23, moderate TV annuloplasty  with 32 mm MC3 partial ring and PFO closure 3/23/2023, s/p ICD 6/8/17, HTN, COPD, CKD III, and chronic tobacco/marijuana use.    NICM s/p DT LVAD 3/2023 LVEF 12%  RV lead dysfunction:  1. ACEi/ARB/ARNi: Continue Lisinopril.  2. BB: Not on d/t RV dysfunction.   3. Aldosterone antagonist: Continue Eplerenone.  4. STLG2i: Continue Jardiance.  5.  SCD prophylaxis: s/p ICD 2017. He then had a recent decrease in pacing impedance after LVAD implant with substantial decrease in sensing and gradual increase in pacing threshold. We discussed lead is showing signs of progressive microperforation. Thus, he had an extraction and reimplant, which he underwent in Jan '24. Then he developed RV lead oversensing resulting in inappropriate ICD therapies. He thus had a new RV lead placed with generator change 9/5/24. Device site well healed, stable lead parameters. Continue routine device clinic follow-up.   6. Fluid status: Continue Bumex  7. Etiology: NICM     Post-op AF after LVAD surgery   Prior VT/VF arrest pre LVAD:   Patch monitor in May '23 showed no AF. Also has no recent ventricular arrhythmias. Has been on amiodarone since LVAD. Given lack of recent atrial and/or ventricular arrhythmias. He is on amiodarone 100 mg daily and can consider further weaning/stopped as outpatient.    Follow up in 1 year or sooner if need arises.        History of Present Illness/Subjective    Mr. Akshat Fragoso is a 59 year old male who comes in today for EP consultation of RV lead issues.    Mr. Fragoso is a 59 year old medical history significant for NICM LVEF 12%, VT/VF arrest on 3/15/2023, s/p DT LVAD 3/23/23, moderate TV annuloplasty  with 32 mm MC3 partial ring and PFO closure 3/23/2023, s/p ICD 6/8/17, HTN,  COPD, CKD III, and chronic tobacco/marijuana use.    In January 2017, he was admitted with worsening JOHNSON and SOB and was found to have NICM. He had reported a 5 year history of JOHNSON that worsened over the last year. Coronary angiogram showed clean coronaries, and RHC with normal CI, and mildly elevated right and left heart filling pressures. He was started on optimal medical therapy. His JOHNSON/SOB improved with medications. Repeat CMRI shows an LVEF of 12% and RVEF of 30%. He had ICD implanted on 6/8/17. He suffered a VT/VF arrest on 3/15/2023 requiring CPR for 6 mins with cardiogenic shock. Ultimately had HM3 LVAD implantation as DT with concomitant TV annuloplasty and PFO closure on 3/23/2023 c/b postop AF (on amiodarone and digoxin) and HAP. He has now had recent decrease in pacing impedance after LVAD implant with substantial decrease in sensing and gradual increase in pacing threshold.    EP visit 10/27/23: He reports feeling well. He denies chest discomfort, palpitations, abdominal fullness/bloating or peripheral edema, shortness of breath, paroxysmal nocturnal dyspnea, orthopnea, lightheadedness, dizziness, pre-syncope, or syncope. Device interrogation shows normal device function, RV lead impedence in clinic today is 203 ohms., no ventricular arrhythmias, and  <1%. Current cardiac medications include: Bumex, Eplerenone, Digoxin, Lisinopril, Jardiance, ASA, Amiodarone, and Warfarin.     EP Visit 4/10/24: He presents 4/10/2024 for follow up after lead extraction and re-implant of new RV lead. He reports feeling well. He denies chest discomfort, palpitations, abdominal fullness/bloating or peripheral edema, shortness of breath, paroxysmal nocturnal dyspnea, orthopnea, lightheadedness, dizziness, pre-syncope, or syncope. Device interrogation shows normal device function, no ventricular arrhythmias, and  <1%. Current cardiac medications include: Bumex, Eplerenone, Digoxin, Lisinopril, Jardiance, ASA, Amiodarone,  and Warfarin.     He presents today for follow up. We were notified 9/3/2024 that patient had RV lead integrity alert. Device interrogation in clinic today showed R waves have decreased from 10.7 mV to 4.8 mV, 2 monitor VT and 10 NSVT episodes lasting 10 seconds with EGMs show oversensing on RV lead channel. Tech services evaluated and reported P-wave oversensing. CXR shows stable lead placement. Patient was returning home from a scheduled clinic visit today and got 4 ICD shocks. Thus, presented to [facility]. Device interrogation here shows he got inappropriate ATP for RV lead oversensing. This triggered arrhyhtmia and 3 shocks were deployed with final shock terminating it. Patient had no LVAD alarms and no symptoms preceding events. He underwent new RV lead implant on 9/5/24. Device site well healed. He reports feeling well. He denies chest discomfort, palpitations, abdominal fullness/bloating or peripheral edema, shortness of breath, paroxysmal nocturnal dyspnea, orthopnea, lightheadedness, dizziness, pre-syncope, or syncope. Device interrogation shows normal device function, stable lead parameters, no arrhythmias, and  0.1%. TSH and LFT WDL today. Current cardiac medications include: Amiodarone, Digoxin, Jardiance, Eplerenone, Lisinopril, Bumex, and Jantoven.       I have reviewed and updated the patient's Past Medical History, Social History, Family History and Medication List.     Cardiographics (Personally Reviewed) :   5/3/23 Echo:   Interpretation Summary  Please refer to the EPIC report for measurements performed at different LVAD  speed settings.  HM3 at 5200RPM at baseline.  LVIDd 43mm.  Septum normal.  Aortic valve remain closed at baseline.    4/2017 CMR:  IMPRESSION:  1.  Severely dilated left ventricle with severely reduced global  function with a calculated ejection fraction of  12 %.  2.  Moderately dilated right ventricle with moderately reduced global  function with a calculated ejection fraction of  30%.   3.  There is mild mitral and tricuspid regurgitation.  4.  The left atrium is severely dilated. The right atrium is mildly  dilated.  5.  On delayed enhancement imaging, there is extensive  hyperenhancement in multiple non-ischemic patterns: transmural,  mid-myocardial, subendocardial, and epicardial. 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 The extent and  distribution of hyperenhancement are unchanged from the prior study.  6.  Compared with the prior study dated 1/11/2017, right ventricular  function has improved.          Physical Examination   BP (!) 80/0 (BP Location: Right arm, Patient Position: Chair, Cuff Size: Adult Large)   Pulse 74   Wt 83.1 kg (183 lb 1.6 oz)   SpO2 95%   BMI 28.68 kg/m    Wt Readings from Last 3 Encounters:   12/11/24 88.8 kg (195 lb 12.8 oz)   09/26/24 88.9 kg (195 lb 14.4 oz)   09/10/24 86.2 kg (190 lb)     General Appearance:   Alert, well-appearing and in no acute distress.   HEENT: Atraumatic, normocephalic. PERRL.  MMM.   Chest/Lungs:   Respirations unlabored.  Lungs are clear to auscultation.   Cardiovascular:   VAD hum. Regular.     Abdomen:  Soft, nontender, nondistended.   Extremities: No cyanosis or clubbing. No edema.    Musculoskeletal: Moves all extremities.  Gait normal.   Skin: Warm, dry, intact.    Neurologic: Mood and affect are appropriate.  Alert and oriented to person, place, time, and situation.          Medications  Allergies   Current Outpatient Medications   Medication Sig Dispense Refill    acetaminophen (TYLENOL) 325 MG tablet Take 2 tablets (650 mg) by mouth every 4 hours as needed for other (For optimal non-opioid multimodal pain management to improve pain control.) 100 tablet 0    amiodarone (PACERONE) 200 MG tablet Take 0.5 tablets (100 mg) by mouth daily. 45 tablet 3    amoxicillin (AMOXIL) 500 MG tablet Take 4 tablets (2,000 mg) by mouth as needed (Take one hour before dental procedure.) 4 tablet 2    bumetanide (BUMEX)  1 MG tablet Take 1 tablet (1 mg) by mouth daily as needed (for weight gain). 45 tablet 3    digoxin (LANOXIN) 250 MCG tablet Take 1 tablet (250 mcg) by mouth daily. 90 tablet 3    empagliflozin (JARDIANCE) 10 MG TABS tablet Take 1 tablet (10 mg) by mouth daily. 90 tablet 3    eplerenone (INSPRA) 25 MG tablet Take 12.5mg (1/2 tab) daily 45 tablet 3    JANTOVEN ANTICOAGULANT 5 MG tablet Take 0.5-1 tablets (2.5-5 mg) by mouth daily. Take 5 mg on Mondays, Wednesdays, and Fridays and 2.5 mg all other days of the week or as directed by your anticoagulation clinic. 70 tablet 1    lisinopril (ZESTRIL) 5 MG tablet Take 1 tablet (5 mg) by mouth daily. 90 tablet 3    oxyCODONE-acetaminophen (PERCOCET) 5-325 MG tablet Take 1 tablet by mouth every 6 hours as needed for pain. 5 tablet 0    oxyCODONE-acetaminophen (PERCOCET) 5-325 MG tablet Take 1 tablet by mouth every 6 hours as needed for severe pain.      pantoprazole (PROTONIX) 20 MG EC tablet Take 1 tablet (20 mg) by mouth every morning (before breakfast). 90 tablet 3    sildenafil (VIAGRA) 50 MG tablet Take 1 tablet (50 mg) by mouth daily as needed (PRN for sexual activity) 30 tablet 0    Allergies   Allergen Reactions    Codeine      Other reaction(s): GI intolerance, Intolerance-Can't Take         Lab Results (Personally Reviewed)    Chemistry/lipid CBC Cardiac Enzymes/BNP/TSH/INR   Lab Results   Component Value Date    BUN 21.9 (H) 12/11/2024     12/11/2024    CO2 27 12/11/2024     Creatinine   Date Value Ref Range Status   12/11/2024 1.19 (H) 0.67 - 1.17 mg/dL Final   11/20/2019 0.90 0.66 - 1.25 mg/dL Final       Lab Results   Component Value Date    CHOL 111 03/20/2023    HDL 39 (L) 03/20/2023    LDL 55 03/20/2023      Lab Results   Component Value Date    WBC 14.2 (H) 12/11/2024    HGB 14.6 12/11/2024    HCT 47.5 12/11/2024    MCV 90 12/11/2024     12/11/2024    Lab Results   Component Value Date    TSH 3.63 12/11/2024    INR 1.73 (H) 12/11/2024        The  patient states understanding and is agreeable with the plan.   VERONICA Hood CNP  Electrophysiology Consult Service  Securely message with [employer]   Text page via [facility] Paging/Directory

## 2024-12-17 ENCOUNTER — DOCUMENTATION ONLY (OUTPATIENT)
Dept: INTERNAL MEDICINE | Facility: CLINIC | Age: 59
End: 2024-12-17
Payer: COMMERCIAL

## 2024-12-17 DIAGNOSIS — Z79.899 ON AMIODARONE THERAPY: Primary | ICD-10-CM

## 2024-12-17 NOTE — PROGRESS NOTES
Anticoagulation Management    Updated home monitor orders needed due to Renewal requested by home monitor company    Current home monitor company: [employer] referring provider: Dr. Shaun Aguiar    Request made by: Home monitor company    Leslie Encinas RN

## 2024-12-18 ENCOUNTER — ANCILLARY PROCEDURE (OUTPATIENT)
Dept: CARDIOLOGY | Facility: CLINIC | Age: 59
End: 2024-12-18
Attending: INTERNAL MEDICINE
Payer: COMMERCIAL

## 2024-12-18 ENCOUNTER — LAB (OUTPATIENT)
Dept: LAB | Facility: CLINIC | Age: 59
End: 2024-12-18
Payer: COMMERCIAL

## 2024-12-18 ENCOUNTER — OFFICE VISIT (OUTPATIENT)
Dept: CARDIOLOGY | Facility: CLINIC | Age: 59
End: 2024-12-18
Attending: NURSE PRACTITIONER
Payer: COMMERCIAL

## 2024-12-18 ENCOUNTER — MYC MEDICAL ADVICE (OUTPATIENT)
Dept: OTHER | Age: 59
End: 2024-12-18

## 2024-12-18 ENCOUNTER — ANTICOAGULATION THERAPY VISIT (OUTPATIENT)
Dept: ANTICOAGULATION | Facility: CLINIC | Age: 59
End: 2024-12-18

## 2024-12-18 VITALS
HEART RATE: 74 BPM | WEIGHT: 183.1 LBS | SYSTOLIC BLOOD PRESSURE: 80 MMHG | OXYGEN SATURATION: 95 % | BODY MASS INDEX: 28.68 KG/M2

## 2024-12-18 DIAGNOSIS — Z79.899 LONG TERM USE OF DRUG: ICD-10-CM

## 2024-12-18 DIAGNOSIS — Z95.811 LVAD (LEFT VENTRICULAR ASSIST DEVICE) PRESENT (H): ICD-10-CM

## 2024-12-18 DIAGNOSIS — Z95.811 LVAD (LEFT VENTRICULAR ASSIST DEVICE) PRESENT (H): Primary | ICD-10-CM

## 2024-12-18 DIAGNOSIS — Z95.810 ICD (IMPLANTABLE CARDIOVERTER-DEFIBRILLATOR) IN PLACE: ICD-10-CM

## 2024-12-18 DIAGNOSIS — I42.9 CARDIOMYOPATHY (H): ICD-10-CM

## 2024-12-18 DIAGNOSIS — Z79.899 ON AMIODARONE THERAPY: ICD-10-CM

## 2024-12-18 DIAGNOSIS — I50.23 ACUTE ON CHRONIC SYSTOLIC CONGESTIVE HEART FAILURE (H): Primary | ICD-10-CM

## 2024-12-18 DIAGNOSIS — I50.22 CHRONIC SYSTOLIC CONGESTIVE HEART FAILURE (H): ICD-10-CM

## 2024-12-18 DIAGNOSIS — I50.22 CHRONIC SYSTOLIC CONGESTIVE HEART FAILURE (H): Primary | ICD-10-CM

## 2024-12-18 DIAGNOSIS — Z95.811 LEFT VENTRICULAR ASSIST DEVICE PRESENT (H): ICD-10-CM

## 2024-12-18 DIAGNOSIS — T82.110A AICD LEAD MALFUNCTION: ICD-10-CM

## 2024-12-18 DIAGNOSIS — I47.20 VENTRICULAR TACHYCARDIA (H): ICD-10-CM

## 2024-12-18 DIAGNOSIS — Z79.01 ANTICOAGULATED ON COUMADIN: ICD-10-CM

## 2024-12-18 DIAGNOSIS — I50.22 CHRONIC SYSTOLIC HEART FAILURE (H): ICD-10-CM

## 2024-12-18 LAB
ALBUMIN SERPL BCG-MCNC: 4.5 G/DL (ref 3.5–5.2)
ALP SERPL-CCNC: 87 U/L (ref 40–150)
ALT SERPL W P-5'-P-CCNC: 21 U/L (ref 0–70)
AST SERPL W P-5'-P-CCNC: 27 U/L (ref 0–45)
BILIRUB DIRECT SERPL-MCNC: 0.22 MG/DL (ref 0–0.3)
BILIRUB SERPL-MCNC: 0.6 MG/DL
CANNABINOIDS SERPL-MCNC: 40 NG/ML
INR PPP: 1.8 (ref 0.85–1.15)
MDC_IDC_LEAD_CONNECTION_STATUS: NORMAL
MDC_IDC_LEAD_IMPLANT_DT: NORMAL
MDC_IDC_LEAD_LOCATION: NORMAL
MDC_IDC_LEAD_LOCATION_DETAIL_1: NORMAL
MDC_IDC_LEAD_MFG: NORMAL
MDC_IDC_LEAD_MODEL: NORMAL
MDC_IDC_LEAD_POLARITY_TYPE: NORMAL
MDC_IDC_LEAD_SERIAL: NORMAL
MDC_IDC_LEAD_SPECIAL_FUNCTION: NORMAL
MDC_IDC_MSMT_BATTERY_DTM: NORMAL
MDC_IDC_MSMT_BATTERY_REMAINING_LONGEVITY: 169 MO
MDC_IDC_MSMT_BATTERY_RRT_TRIGGER: NORMAL
MDC_IDC_MSMT_BATTERY_STATUS: NORMAL
MDC_IDC_MSMT_BATTERY_VOLTAGE: 3.13 V
MDC_IDC_MSMT_CAP_CHARGE_DTM: NORMAL
MDC_IDC_MSMT_CAP_CHARGE_ENERGY: 18 J
MDC_IDC_MSMT_CAP_CHARGE_TIME: 3.4 S
MDC_IDC_MSMT_CAP_CHARGE_TYPE: NORMAL
MDC_IDC_MSMT_LEADCHNL_RV_IMPEDANCE_VALUE: 437 OHM
MDC_IDC_MSMT_LEADCHNL_RV_PACING_THRESHOLD_AMPLITUDE: 0.75 V
MDC_IDC_MSMT_LEADCHNL_RV_PACING_THRESHOLD_PULSEWIDTH: 0.4 MS
MDC_IDC_MSMT_LEADCHNL_RV_SENSING_INTR_AMPL: 14.1 MV
MDC_IDC_PG_IMPLANT_DTM: NORMAL
MDC_IDC_PG_MFG: NORMAL
MDC_IDC_PG_MODEL: NORMAL
MDC_IDC_PG_SERIAL: NORMAL
MDC_IDC_PG_TYPE: NORMAL
MDC_IDC_SESS_CLINIC_NAME: NORMAL
MDC_IDC_SESS_DTM: NORMAL
MDC_IDC_SESS_TYPE: NORMAL
MDC_IDC_SET_BRADY_HYSTRATE: NORMAL
MDC_IDC_SET_BRADY_LOWRATE: 40 {BEATS}/MIN
MDC_IDC_SET_BRADY_MODE: NORMAL
MDC_IDC_SET_LEADCHNL_RA_SENSING_SENSITIVITY: NORMAL
MDC_IDC_SET_LEADCHNL_RV_PACING_AMPLITUDE: 2 V
MDC_IDC_SET_LEADCHNL_RV_PACING_ANODE_ELECTRODE_1: NORMAL
MDC_IDC_SET_LEADCHNL_RV_PACING_ANODE_LOCATION_1: NORMAL
MDC_IDC_SET_LEADCHNL_RV_PACING_CAPTURE_MODE: NORMAL
MDC_IDC_SET_LEADCHNL_RV_PACING_CATHODE_ELECTRODE_1: NORMAL
MDC_IDC_SET_LEADCHNL_RV_PACING_CATHODE_LOCATION_1: NORMAL
MDC_IDC_SET_LEADCHNL_RV_PACING_POLARITY: NORMAL
MDC_IDC_SET_LEADCHNL_RV_PACING_PULSEWIDTH: 0.4 MS
MDC_IDC_SET_LEADCHNL_RV_SENSING_ANODE_ELECTRODE_1: NORMAL
MDC_IDC_SET_LEADCHNL_RV_SENSING_ANODE_LOCATION_1: NORMAL
MDC_IDC_SET_LEADCHNL_RV_SENSING_CATHODE_ELECTRODE_1: NORMAL
MDC_IDC_SET_LEADCHNL_RV_SENSING_CATHODE_LOCATION_1: NORMAL
MDC_IDC_SET_LEADCHNL_RV_SENSING_POLARITY: NORMAL
MDC_IDC_SET_LEADCHNL_RV_SENSING_SENSITIVITY: 0.45 MV
MDC_IDC_SET_ZONE_DETECTION_BEATS_DENOMINATOR: 16 {BEATS}
MDC_IDC_SET_ZONE_DETECTION_BEATS_DENOMINATOR: 32 {BEATS}
MDC_IDC_SET_ZONE_DETECTION_BEATS_DENOMINATOR: 40 {BEATS}
MDC_IDC_SET_ZONE_DETECTION_BEATS_NUMERATOR: 16 {BEATS}
MDC_IDC_SET_ZONE_DETECTION_BEATS_NUMERATOR: 30 {BEATS}
MDC_IDC_SET_ZONE_DETECTION_BEATS_NUMERATOR: 32 {BEATS}
MDC_IDC_SET_ZONE_DETECTION_INTERVAL: 240 MS
MDC_IDC_SET_ZONE_DETECTION_INTERVAL: 280 MS
MDC_IDC_SET_ZONE_DETECTION_INTERVAL: 330 MS
MDC_IDC_SET_ZONE_DETECTION_INTERVAL: 450 MS
MDC_IDC_SET_ZONE_STATUS: NORMAL
MDC_IDC_SET_ZONE_TYPE: NORMAL
MDC_IDC_SET_ZONE_VENDOR_TYPE: NORMAL
MDC_IDC_STAT_AT_BURDEN_PERCENT: 0 %
MDC_IDC_STAT_AT_DTM_END: NORMAL
MDC_IDC_STAT_AT_DTM_START: NORMAL
MDC_IDC_STAT_BRADY_DTM_END: NORMAL
MDC_IDC_STAT_BRADY_DTM_START: NORMAL
MDC_IDC_STAT_BRADY_RV_PERCENT_PACED: 0.14 %
MDC_IDC_STAT_EPISODE_RECENT_COUNT: 0
MDC_IDC_STAT_EPISODE_RECENT_COUNT_DTM_END: NORMAL
MDC_IDC_STAT_EPISODE_RECENT_COUNT_DTM_START: NORMAL
MDC_IDC_STAT_EPISODE_TOTAL_COUNT: 0
MDC_IDC_STAT_EPISODE_TOTAL_COUNT: 3
MDC_IDC_STAT_EPISODE_TOTAL_COUNT_DTM_END: NORMAL
MDC_IDC_STAT_EPISODE_TOTAL_COUNT_DTM_START: NORMAL
MDC_IDC_STAT_EPISODE_TYPE: NORMAL
MDC_IDC_STAT_TACHYTHERAPY_ATP_DELIVERED_RECENT: 0
MDC_IDC_STAT_TACHYTHERAPY_ATP_DELIVERED_TOTAL: 0
MDC_IDC_STAT_TACHYTHERAPY_RECENT_DTM_END: NORMAL
MDC_IDC_STAT_TACHYTHERAPY_RECENT_DTM_START: NORMAL
MDC_IDC_STAT_TACHYTHERAPY_SHOCKS_ABORTED_RECENT: 0
MDC_IDC_STAT_TACHYTHERAPY_SHOCKS_ABORTED_TOTAL: 0
MDC_IDC_STAT_TACHYTHERAPY_SHOCKS_DELIVERED_RECENT: 0
MDC_IDC_STAT_TACHYTHERAPY_SHOCKS_DELIVERED_TOTAL: 0
MDC_IDC_STAT_TACHYTHERAPY_TOTAL_DTM_END: NORMAL
MDC_IDC_STAT_TACHYTHERAPY_TOTAL_DTM_START: NORMAL
PROT SERPL-MCNC: 7.5 G/DL (ref 6.4–8.3)
TSH SERPL DL<=0.005 MIU/L-ACNC: 3.07 UIU/ML (ref 0.3–4.2)
WBC # BLD AUTO: 12.4 10E3/UL (ref 4–11)

## 2024-12-18 PROCEDURE — 85048 AUTOMATED LEUKOCYTE COUNT: CPT | Performed by: PATHOLOGY

## 2024-12-18 PROCEDURE — 85610 PROTHROMBIN TIME: CPT | Performed by: PATHOLOGY

## 2024-12-18 PROCEDURE — 93282 PRGRMG EVAL IMPLANTABLE DFB: CPT | Performed by: INTERNAL MEDICINE

## 2024-12-18 PROCEDURE — 36415 COLL VENOUS BLD VENIPUNCTURE: CPT | Performed by: PATHOLOGY

## 2024-12-18 PROCEDURE — 80076 HEPATIC FUNCTION PANEL: CPT | Performed by: PATHOLOGY

## 2024-12-18 PROCEDURE — G0463 HOSPITAL OUTPT CLINIC VISIT: HCPCS | Performed by: NURSE PRACTITIONER

## 2024-12-18 PROCEDURE — 84443 ASSAY THYROID STIM HORMONE: CPT | Performed by: PATHOLOGY

## 2024-12-18 NOTE — PROGRESS NOTES
ANTICOAGULATION MANAGEMENT     Akshat Fragoso 59 year old male is on warfarin with subtherapeutic INR result. (Goal INR 2.0-3.0)    Recent labs: (last 7 days)     12/18/24  1352   INR 1.80*       ASSESSMENT     Source(s): Chart Review     Warfarin doses taken: Reviewed in chart  Diet: No new diet changes identified  Medication/supplement changes:  Akshat should have completed a 2 week course of amoxicillin earlier this week  New illness, injury, or hospitalization: No  Signs or symptoms of bleeding or clotting: No  Previous result: Subtherapeutic  Additional findings:  reviewed OV notes today, it does not appear any medication changes were made       PLAN     Recommended plan for temporary change(s) with abx, however Akshat had one in range and one subtherapeutic INR last week while he was mid-course of amoxicillin so unclear if this is affecting INR or not    Dosing Instructions: booster dose then Increase your warfarin dose (9% change) with next INR in 1 week       Summary  As of 12/18/2024      Full warfarin instructions:  12/18: 7.5 mg; Otherwise 2.5 mg every Sun, Tue, Thu; 5 mg all other days   Next INR check:  12/26/2024               Detailed voice message left for Akshat with dosing instructions and follow up date.   Sent [employer] message with dosing and follow up instructions    Patient to recheck with home meter    Education provided: Please call back if any changes to your diet, medications or how you've been taking warfarin  Contact [phone] with any changes, questions or concerns.     Plan made per [facility] anticoagulation protocol and per LVAD protocol    Addie Will RN  12/18/2024  Anticoagulation Clinic  [facility] for routing messages: earnest SOMMER LVAD  [facility] patient phone line: [phone]        _______________________________________________________________________     Anticoagulation Episode Summary       Current INR goal:  2.0-3.0   TTR:  95.0% (11.9 mo)   Target end date:  Indefinite   Send INR  reminders to:  ANTICOAG LVAD    Indications    Acute on chronic systolic congestive heart failure (H) [I50.23]  LVAD (left ventricular assist device) present (H) [Z95.811]  Chronic systolic congestive heart failure (H) [I50.22]  Long term use of drug [Z79.899]  Left ventricular assist device present (H) [Z95.811]  Anticoagulated on Coumadin [Z79.01]             Comments:  Follow VAD Anticoag protocol:Yes: HeartMate 3   Bridging: Enoxaparin   Date VAD placed: 3/23/23             Anticoagulation Care Providers       Provider Role Specialty Phone number    Kenzie Moreau MD Referring Advanced Heart Failure and Transplant Cardiology [phone]    Mile Bolivar, APRN CNP Referring Nurse Practitioner [phone]    Shaun Aguiar MD Referring Cardiovascular Disease [phone]

## 2024-12-18 NOTE — NURSING NOTE
Akshat's battery has a dented contact making it not useable. It causes low voltage beeping when he tries to use it. This battery was replaced - see VAD checklist for details. There were no other equipment issues.

## 2024-12-18 NOTE — PROGRESS NOTES
Patient confirms that he is done with Amoxicillin course.  Akshat has been eating between 4-6 apples daily and eating a lot of popcorn.  According to the literature both these foods contain minimal amounts of vitamin K.  Patient will increase warfarin dose as suggested.

## 2024-12-18 NOTE — LETTER
12/18/2024      RE: Akshat Fragoso  [address]  [facility] [address]       Dear Colleague,    Thank you for the opportunity to participate in the care of your patient, Akshat Fragoso, at the [facility] [place] [facility]. Please see a copy of my visit note below.        ELECTROPHYSIOLOGY CLINIC VISIT    Assessment/Recommendations   Assessment/Plan:    Mr. Fragoso is a 59 year old medical history significant for NICM LVEF 12%, VT/VF arrest on 3/15/2023, s/p DT LVAD 3/23/23, moderate TV annuloplasty  with 32 mm MC3 partial ring and PFO closure 3/23/2023, s/p ICD 6/8/17, HTN, COPD, CKD III, and chronic tobacco/marijuana use.    NICM s/p DT LVAD 3/2023 LVEF 12%  RV lead dysfunction:  1. ACEi/ARB/ARNi: Continue Lisinopril.  2. BB: Not on d/t RV dysfunction.   3. Aldosterone antagonist: Continue Eplerenone.  4. STLG2i: Continue Jardiance.  5.  SCD prophylaxis: s/p ICD 2017. He then had a recent decrease in pacing impedance after LVAD implant with substantial decrease in sensing and gradual increase in pacing threshold. We discussed lead is showing signs of progressive microperforation. Thus, he had an extraction and reimplant, which he underwent in Jan '24. Then he developed RV lead oversensing resulting in inappropriate ICD therapies. He thus had a new RV lead placed with generator change 9/5/24. Device site well healed, stable lead parameters. Continue routine device clinic follow-up.   6. Fluid status: Continue Bumex  7. Etiology: NICM     Post-op AF after LVAD surgery   Prior VT/VF arrest pre LVAD:   Patch monitor in May '23 showed no AF. Also has no recent ventricular arrhythmias. Has been on amiodarone since LVAD. Given lack of recent atrial and/or ventricular arrhythmias. He is on amiodarone 100 mg daily and can consider further weaning/stopped as outpatient.    Follow up in 1 year or sooner if need arises.        History of Present  Illness/Subjective    Mr. Akshat Fragoso is a 59 year old male who comes in today for EP consultation of RV lead issues.    Mr. Fragoso is a 59 year old medical history significant for NICM LVEF 12%, VT/VF arrest on 3/15/2023, s/p DT LVAD 3/23/23, moderate TV annuloplasty  with 32 mm MC3 partial ring and PFO closure 3/23/2023, s/p ICD 6/8/17, HTN, COPD, CKD III, and chronic tobacco/marijuana use.    In January 2017, he was admitted with worsening JOHNSON and SOB and was found to have NICM. He had reported a 5 year history of JOHNSON that worsened over the last year. Coronary angiogram showed clean coronaries, and RHC with normal CI, and mildly elevated right and left heart filling pressures. He was started on optimal medical therapy. His JOHNSON/SOB improved with medications. Repeat CMRI shows an LVEF of 12% and RVEF of 30%. He had ICD implanted on 6/8/17. He suffered a VT/VF arrest on 3/15/2023 requiring CPR for 6 mins with cardiogenic shock. Ultimately had HM3 LVAD implantation as DT with concomitant TV annuloplasty and PFO closure on 3/23/2023 c/b postop AF (on amiodarone and digoxin) and HAP. He has now had recent decrease in pacing impedance after LVAD implant with substantial decrease in sensing and gradual increase in pacing threshold.    EP visit 10/27/23: He reports feeling well. He denies chest discomfort, palpitations, abdominal fullness/bloating or peripheral edema, shortness of breath, paroxysmal nocturnal dyspnea, orthopnea, lightheadedness, dizziness, pre-syncope, or syncope. Device interrogation shows normal device function, RV lead impedence in clinic today is 203 ohms., no ventricular arrhythmias, and  <1%. Current cardiac medications include: Bumex, Eplerenone, Digoxin, Lisinopril, Jardiance, ASA, Amiodarone, and Warfarin.     EP Visit 4/10/24: He presents 4/10/2024 for follow up after lead extraction and re-implant of new RV lead. He reports feeling well. He denies chest discomfort, palpitations, abdominal  fullness/bloating or peripheral edema, shortness of breath, paroxysmal nocturnal dyspnea, orthopnea, lightheadedness, dizziness, pre-syncope, or syncope. Device interrogation shows normal device function, no ventricular arrhythmias, and  <1%. Current cardiac medications include: Bumex, Eplerenone, Digoxin, Lisinopril, Jardiance, ASA, Amiodarone, and Warfarin.     He presents today for follow up. We were notified 9/3/2024 that patient had RV lead integrity alert. Device interrogation in clinic today showed R waves have decreased from 10.7 mV to 4.8 mV, 2 monitor VT and 10 NSVT episodes lasting 10 seconds with EGMs show oversensing on RV lead channel. Tech services evaluated and reported P-wave oversensing. CXR shows stable lead placement. Patient was returning home from a scheduled clinic visit today and got 4 ICD shocks. Thus, presented to [facility]. Device interrogation here shows he got inappropriate ATP for RV lead oversensing. This triggered arrhyhtmia and 3 shocks were deployed with final shock terminating it. Patient had no LVAD alarms and no symptoms preceding events. He underwent new RV lead implant on 9/5/24. Device site well healed. He reports feeling well. He denies chest discomfort, palpitations, abdominal fullness/bloating or peripheral edema, shortness of breath, paroxysmal nocturnal dyspnea, orthopnea, lightheadedness, dizziness, pre-syncope, or syncope. Device interrogation shows normal device function, stable lead parameters, no arrhythmias, and  0.1%. TSH and LFT WDL today. Current cardiac medications include: Amiodarone, Digoxin, Jardiance, Eplerenone, Lisinopril, Bumex, and Jantoven.       I have reviewed and updated the patient's Past Medical History, Social History, Family History and Medication List.     Cardiographics (Personally Reviewed) :   5/3/23 Echo:   Interpretation Summary  Please refer to the EPIC report for measurements performed at different LVAD  speed settings.  HM3 at 5200RPM at  baseline.  LVIDd 43mm.  Septum normal.  Aortic valve remain closed at baseline.    4/2017 CMR:  IMPRESSION:  1.  Severely dilated left ventricle with severely reduced global  function with a calculated ejection fraction of  12 %.  2.  Moderately dilated right ventricle with moderately reduced global  function with a calculated ejection fraction of 30%.   3.  There is mild mitral and tricuspid regurgitation.  4.  The left atrium is severely dilated. The right atrium is mildly  dilated.  5.  On delayed enhancement imaging, there is extensive  hyperenhancement in multiple non-ischemic patterns: transmural,  mid-myocardial, subendocardial, and epicardial. 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 The extent and  distribution of hyperenhancement are unchanged from the prior study.  6.  Compared with the prior study dated 1/11/2017, right ventricular  function has improved.          Physical Examination   BP (!) 80/0 (BP Location: Right arm, Patient Position: Chair, Cuff Size: Adult Large)   Pulse 74   Wt 83.1 kg (183 lb 1.6 oz)   SpO2 95%   BMI 28.68 kg/m    Wt Readings from Last 3 Encounters:   12/11/24 88.8 kg (195 lb 12.8 oz)   09/26/24 88.9 kg (195 lb 14.4 oz)   09/10/24 86.2 kg (190 lb)     General Appearance:   Alert, well-appearing and in no acute distress.   HEENT: Atraumatic, normocephalic. PERRL.  MMM.   Chest/Lungs:   Respirations unlabored.  Lungs are clear to auscultation.   Cardiovascular:   VAD hum. Regular.     Abdomen:  Soft, nontender, nondistended.   Extremities: No cyanosis or clubbing. No edema.    Musculoskeletal: Moves all extremities.  Gait normal.   Skin: Warm, dry, intact.    Neurologic: Mood and affect are appropriate.  Alert and oriented to person, place, time, and situation.          Medications  Allergies   Current Outpatient Medications   Medication Sig Dispense Refill     acetaminophen (TYLENOL) 325 MG tablet Take 2 tablets (650 mg) by mouth every 4 hours as  needed for other (For optimal non-opioid multimodal pain management to improve pain control.) 100 tablet 0     amiodarone (PACERONE) 200 MG tablet Take 0.5 tablets (100 mg) by mouth daily. 45 tablet 3     amoxicillin (AMOXIL) 500 MG tablet Take 4 tablets (2,000 mg) by mouth as needed (Take one hour before dental procedure.) 4 tablet 2     bumetanide (BUMEX) 1 MG tablet Take 1 tablet (1 mg) by mouth daily as needed (for weight gain). 45 tablet 3     digoxin (LANOXIN) 250 MCG tablet Take 1 tablet (250 mcg) by mouth daily. 90 tablet 3     empagliflozin (JARDIANCE) 10 MG TABS tablet Take 1 tablet (10 mg) by mouth daily. 90 tablet 3     eplerenone (INSPRA) 25 MG tablet Take 12.5mg (1/2 tab) daily 45 tablet 3     JANTOVEN ANTICOAGULANT 5 MG tablet Take 0.5-1 tablets (2.5-5 mg) by mouth daily. Take 5 mg on Mondays, Wednesdays, and Fridays and 2.5 mg all other days of the week or as directed by your anticoagulation clinic. 70 tablet 1     lisinopril (ZESTRIL) 5 MG tablet Take 1 tablet (5 mg) by mouth daily. 90 tablet 3     oxyCODONE-acetaminophen (PERCOCET) 5-325 MG tablet Take 1 tablet by mouth every 6 hours as needed for pain. 5 tablet 0     oxyCODONE-acetaminophen (PERCOCET) 5-325 MG tablet Take 1 tablet by mouth every 6 hours as needed for severe pain.       pantoprazole (PROTONIX) 20 MG EC tablet Take 1 tablet (20 mg) by mouth every morning (before breakfast). 90 tablet 3     sildenafil (VIAGRA) 50 MG tablet Take 1 tablet (50 mg) by mouth daily as needed (PRN for sexual activity) 30 tablet 0    Allergies   Allergen Reactions     Codeine      Other reaction(s): GI intolerance, Intolerance-Can't Take         Lab Results (Personally Reviewed)    Chemistry/lipid CBC Cardiac Enzymes/BNP/TSH/INR   Lab Results   Component Value Date    BUN 21.9 (H) 12/11/2024     12/11/2024    CO2 27 12/11/2024     Creatinine   Date Value Ref Range Status   12/11/2024 1.19 (H) 0.67 - 1.17 mg/dL Final   11/20/2019 0.90 0.66 - 1.25 mg/dL  Final       Lab Results   Component Value Date    CHOL 111 03/20/2023    HDL 39 (L) 03/20/2023    LDL 55 03/20/2023      Lab Results   Component Value Date    WBC 14.2 (H) 12/11/2024    HGB 14.6 12/11/2024    HCT 47.5 12/11/2024    MCV 90 12/11/2024     12/11/2024    Lab Results   Component Value Date    TSH 3.63 12/11/2024    INR 1.73 (H) 12/11/2024        The patient states understanding and is agreeable with the plan.   VERONICA Hood CNP  Electrophysiology Consult Service  Securely message with Intelligent Mobile Support   Text page via [facility] Paging/Directory         Please do not hesitate to contact me if you have any questions/concerns.     Sincerely,     VERONICA Scott CNP

## 2024-12-18 NOTE — PATIENT INSTRUCTIONS
It was a pleasure to see you in clinic today.  Please do not hesitate to call with any questions or concerns.  We look forward to seeing you in clinic at your next device check in 3 months.    Mirella Gao, RN, MS, CCRN  Electrophysiology Nurse Clinician  [facility]    During Business Hours Please Call:  [phone]  After Hours Please Call:  [phone] - select option #4 and ask for job code [phone]

## 2024-12-21 LAB — INR HOME MONITORING: 2.3 (ref 2–3)

## 2024-12-22 LAB
MDC_IDC_LEAD_CONNECTION_STATUS: NORMAL
MDC_IDC_LEAD_IMPLANT_DT: NORMAL
MDC_IDC_LEAD_LOCATION: NORMAL
MDC_IDC_LEAD_LOCATION_DETAIL_1: NORMAL
MDC_IDC_LEAD_MFG: NORMAL
MDC_IDC_LEAD_MODEL: NORMAL
MDC_IDC_LEAD_POLARITY_TYPE: NORMAL
MDC_IDC_LEAD_SERIAL: NORMAL
MDC_IDC_LEAD_SPECIAL_FUNCTION: NORMAL
MDC_IDC_MSMT_BATTERY_DTM: NORMAL
MDC_IDC_MSMT_BATTERY_REMAINING_LONGEVITY: 169 MO
MDC_IDC_MSMT_BATTERY_RRT_TRIGGER: NORMAL
MDC_IDC_MSMT_BATTERY_STATUS: NORMAL
MDC_IDC_MSMT_BATTERY_VOLTAGE: 3.13 V
MDC_IDC_MSMT_CAP_CHARGE_DTM: NORMAL
MDC_IDC_MSMT_CAP_CHARGE_ENERGY: 18 J
MDC_IDC_MSMT_CAP_CHARGE_TIME: 3.4 S
MDC_IDC_MSMT_CAP_CHARGE_TYPE: NORMAL
MDC_IDC_MSMT_LEADCHNL_RV_IMPEDANCE_VALUE: 437 OHM
MDC_IDC_MSMT_LEADCHNL_RV_PACING_THRESHOLD_AMPLITUDE: 0.75 V
MDC_IDC_MSMT_LEADCHNL_RV_PACING_THRESHOLD_PULSEWIDTH: 0.4 MS
MDC_IDC_MSMT_LEADCHNL_RV_SENSING_INTR_AMPL: 14.1 MV
MDC_IDC_PG_IMPLANT_DTM: NORMAL
MDC_IDC_PG_MFG: NORMAL
MDC_IDC_PG_MODEL: NORMAL
MDC_IDC_PG_SERIAL: NORMAL
MDC_IDC_PG_TYPE: NORMAL
MDC_IDC_SESS_CLINIC_NAME: NORMAL
MDC_IDC_SESS_DTM: NORMAL
MDC_IDC_SESS_TYPE: NORMAL
MDC_IDC_SET_BRADY_HYSTRATE: NORMAL
MDC_IDC_SET_BRADY_LOWRATE: 40 {BEATS}/MIN
MDC_IDC_SET_BRADY_MODE: NORMAL
MDC_IDC_SET_LEADCHNL_RA_SENSING_SENSITIVITY: NORMAL
MDC_IDC_SET_LEADCHNL_RV_PACING_AMPLITUDE: 2 V
MDC_IDC_SET_LEADCHNL_RV_PACING_ANODE_ELECTRODE_1: NORMAL
MDC_IDC_SET_LEADCHNL_RV_PACING_ANODE_LOCATION_1: NORMAL
MDC_IDC_SET_LEADCHNL_RV_PACING_CAPTURE_MODE: NORMAL
MDC_IDC_SET_LEADCHNL_RV_PACING_CATHODE_ELECTRODE_1: NORMAL
MDC_IDC_SET_LEADCHNL_RV_PACING_CATHODE_LOCATION_1: NORMAL
MDC_IDC_SET_LEADCHNL_RV_PACING_POLARITY: NORMAL
MDC_IDC_SET_LEADCHNL_RV_PACING_PULSEWIDTH: 0.4 MS
MDC_IDC_SET_LEADCHNL_RV_SENSING_ANODE_ELECTRODE_1: NORMAL
MDC_IDC_SET_LEADCHNL_RV_SENSING_ANODE_LOCATION_1: NORMAL
MDC_IDC_SET_LEADCHNL_RV_SENSING_CATHODE_ELECTRODE_1: NORMAL
MDC_IDC_SET_LEADCHNL_RV_SENSING_CATHODE_LOCATION_1: NORMAL
MDC_IDC_SET_LEADCHNL_RV_SENSING_POLARITY: NORMAL
MDC_IDC_SET_LEADCHNL_RV_SENSING_SENSITIVITY: 0.45 MV
MDC_IDC_SET_ZONE_DETECTION_BEATS_DENOMINATOR: 16 {BEATS}
MDC_IDC_SET_ZONE_DETECTION_BEATS_DENOMINATOR: 32 {BEATS}
MDC_IDC_SET_ZONE_DETECTION_BEATS_DENOMINATOR: 40 {BEATS}
MDC_IDC_SET_ZONE_DETECTION_BEATS_NUMERATOR: 16 {BEATS}
MDC_IDC_SET_ZONE_DETECTION_BEATS_NUMERATOR: 30 {BEATS}
MDC_IDC_SET_ZONE_DETECTION_BEATS_NUMERATOR: 32 {BEATS}
MDC_IDC_SET_ZONE_DETECTION_INTERVAL: 240 MS
MDC_IDC_SET_ZONE_DETECTION_INTERVAL: 280 MS
MDC_IDC_SET_ZONE_DETECTION_INTERVAL: 330 MS
MDC_IDC_SET_ZONE_DETECTION_INTERVAL: 450 MS
MDC_IDC_SET_ZONE_STATUS: NORMAL
MDC_IDC_SET_ZONE_TYPE: NORMAL
MDC_IDC_SET_ZONE_VENDOR_TYPE: NORMAL
MDC_IDC_STAT_AT_BURDEN_PERCENT: 0 %
MDC_IDC_STAT_AT_DTM_END: NORMAL
MDC_IDC_STAT_AT_DTM_START: NORMAL
MDC_IDC_STAT_BRADY_DTM_END: NORMAL
MDC_IDC_STAT_BRADY_DTM_START: NORMAL
MDC_IDC_STAT_BRADY_RV_PERCENT_PACED: 0.14 %
MDC_IDC_STAT_EPISODE_RECENT_COUNT: 0
MDC_IDC_STAT_EPISODE_RECENT_COUNT_DTM_END: NORMAL
MDC_IDC_STAT_EPISODE_RECENT_COUNT_DTM_START: NORMAL
MDC_IDC_STAT_EPISODE_TOTAL_COUNT: 0
MDC_IDC_STAT_EPISODE_TOTAL_COUNT: 3
MDC_IDC_STAT_EPISODE_TOTAL_COUNT_DTM_END: NORMAL
MDC_IDC_STAT_EPISODE_TOTAL_COUNT_DTM_START: NORMAL
MDC_IDC_STAT_EPISODE_TYPE: NORMAL
MDC_IDC_STAT_TACHYTHERAPY_ATP_DELIVERED_RECENT: 0
MDC_IDC_STAT_TACHYTHERAPY_ATP_DELIVERED_TOTAL: 0
MDC_IDC_STAT_TACHYTHERAPY_RECENT_DTM_END: NORMAL
MDC_IDC_STAT_TACHYTHERAPY_RECENT_DTM_START: NORMAL
MDC_IDC_STAT_TACHYTHERAPY_SHOCKS_ABORTED_RECENT: 0
MDC_IDC_STAT_TACHYTHERAPY_SHOCKS_ABORTED_TOTAL: 0
MDC_IDC_STAT_TACHYTHERAPY_SHOCKS_DELIVERED_RECENT: 0
MDC_IDC_STAT_TACHYTHERAPY_SHOCKS_DELIVERED_TOTAL: 0
MDC_IDC_STAT_TACHYTHERAPY_TOTAL_DTM_END: NORMAL
MDC_IDC_STAT_TACHYTHERAPY_TOTAL_DTM_START: NORMAL

## 2024-12-23 ENCOUNTER — MYC MEDICAL ADVICE (OUTPATIENT)
Dept: ANTICOAGULATION | Facility: CLINIC | Age: 59
End: 2024-12-23
Payer: COMMERCIAL

## 2024-12-23 ENCOUNTER — ANTICOAGULATION THERAPY VISIT (OUTPATIENT)
Dept: ANTICOAGULATION | Facility: CLINIC | Age: 59
End: 2024-12-23
Payer: COMMERCIAL

## 2024-12-23 NOTE — PROGRESS NOTES
ANTICOAGULATION MANAGEMENT     Akshat Fragoso 59 year old male is on warfarin with therapeutic INR result. (Goal INR 2.0-3.0)    Recent labs: (last 7 days)     12/21/24  0000   INR 2.3       ASSESSMENT     Source(s): Chart Review  Previous INR was Subtherapeutic  Medication, diet, health changes since last INR chart reviewed; none identified  MD was increased on 12/18/24         PLAN     Recommended plan for ongoing change(s) affecting INR     Dosing Instructions: Continue your current warfarin dose with next INR in 6 days       Summary  As of 12/23/2024      Full warfarin instructions:  2.5 mg every Sun, Tue, Thu; 5 mg all other days   Next INR check:  12/27/2024               Detailed voice message left for Akshat with dosing instructions and follow up date.   Sent [employer] message with dosing and follow up instructions    Patient to recheck with home meter    Education provided: Please call back if any changes to your diet, medications or how you've been taking warfarin  Contact [phone] with any changes, questions or concerns.     Plan made per [facility] anticoagulation protocol and per LVAD protocol    Marcia Park RN  12/23/2024  Anticoagulation Clinic  [facility] for routing messages: earnest ANTICOAG LVAD  [facility] patient phone line: [phone]        _______________________________________________________________________     Anticoagulation Episode Summary       Current INR goal:  2.0-3.0   TTR:  94.7% (11.8 mo)   Target end date:  Indefinite   Send INR reminders to:  ANTICOAG LVAD    Indications    Acute on chronic systolic congestive heart failure (H) [I50.23]  LVAD (left ventricular assist device) present (H) [Z95.811]  Chronic systolic congestive heart failure (H) [I50.22]  Long term use of drug [Z79.899]  Left ventricular assist device present (H) [Z95.811]  Anticoagulated on Coumadin [Z79.01]             Comments:  Follow VAD Anticoag protocol:Yes: HeartMate 3   Bridging: Enoxaparin   Date VAD placed: 3/23/23              Anticoagulation Care Providers       Provider Role Specialty Phone number    Kenzie Moreau MD Referring Advanced Heart Failure and Transplant Cardiology [phone]    Mile Bolivar, APRN CNP Referring Nurse Practitioner [phone]    Shaun Aguiar MD Referring Cardiovascular Disease [phone]

## 2024-12-31 ENCOUNTER — DOCUMENTATION ONLY (OUTPATIENT)
Dept: ANTICOAGULATION | Facility: CLINIC | Age: 59
End: 2024-12-31
Payer: COMMERCIAL

## 2024-12-31 NOTE — PROGRESS NOTES
"Anticoagulation Management    Updated home monitor orders needed due to Renewal requested by home monitor company (\"insurance provider is requesting a new prescription\")    Current home monitor company: [employer] M[employer]AR[employer] referring provider: Shaun Aguiar    Request made by: Home monitor company     Maggy Grover RN         "

## 2025-01-11 ENCOUNTER — ANCILLARY PROCEDURE (OUTPATIENT)
Dept: CARDIOLOGY | Facility: CLINIC | Age: 60
End: 2025-01-11
Attending: INTERNAL MEDICINE
Payer: COMMERCIAL

## 2025-01-11 DIAGNOSIS — I42.9 CARDIOMYOPATHY (H): ICD-10-CM

## 2025-01-11 DIAGNOSIS — I50.22 CHRONIC SYSTOLIC HEART FAILURE (H): ICD-10-CM

## 2025-01-11 DIAGNOSIS — Z95.810 ICD (IMPLANTABLE CARDIOVERTER-DEFIBRILLATOR) IN PLACE: ICD-10-CM

## 2025-01-11 PROCEDURE — 93296 REM INTERROG EVL PM/IDS: CPT

## 2025-01-13 ENCOUNTER — CARE COORDINATION (OUTPATIENT)
Dept: CARDIOLOGY | Facility: CLINIC | Age: 60
End: 2025-01-13
Payer: COMMERCIAL

## 2025-01-13 ENCOUNTER — TELEPHONE (OUTPATIENT)
Dept: INTERNAL MEDICINE | Facility: CLINIC | Age: 60
End: 2025-01-13
Payer: COMMERCIAL

## 2025-01-13 LAB
MDC_IDC_LEAD_CONNECTION_STATUS: NORMAL
MDC_IDC_LEAD_IMPLANT_DT: NORMAL
MDC_IDC_LEAD_LOCATION: NORMAL
MDC_IDC_LEAD_LOCATION_DETAIL_1: NORMAL
MDC_IDC_LEAD_MFG: NORMAL
MDC_IDC_LEAD_MODEL: NORMAL
MDC_IDC_LEAD_POLARITY_TYPE: NORMAL
MDC_IDC_LEAD_SERIAL: NORMAL
MDC_IDC_LEAD_SPECIAL_FUNCTION: NORMAL
MDC_IDC_MSMT_BATTERY_DTM: NORMAL
MDC_IDC_MSMT_BATTERY_REMAINING_LONGEVITY: 168 MO
MDC_IDC_MSMT_BATTERY_RRT_TRIGGER: NORMAL
MDC_IDC_MSMT_BATTERY_STATUS: NORMAL
MDC_IDC_MSMT_BATTERY_VOLTAGE: 3.07 V
MDC_IDC_MSMT_CAP_CHARGE_DTM: NORMAL
MDC_IDC_MSMT_CAP_CHARGE_ENERGY: 18 J
MDC_IDC_MSMT_CAP_CHARGE_TIME: 3.5 S
MDC_IDC_MSMT_CAP_CHARGE_TYPE: NORMAL
MDC_IDC_MSMT_LEADCHNL_RV_IMPEDANCE_VALUE: 304 OHM
MDC_IDC_MSMT_LEADCHNL_RV_IMPEDANCE_VALUE: 399 OHM
MDC_IDC_MSMT_LEADCHNL_RV_PACING_THRESHOLD_AMPLITUDE: 0.88 V
MDC_IDC_MSMT_LEADCHNL_RV_PACING_THRESHOLD_PULSEWIDTH: 0.4 MS
MDC_IDC_MSMT_LEADCHNL_RV_SENSING_INTR_AMPL: 13.3 MV
MDC_IDC_PG_IMPLANT_DTM: NORMAL
MDC_IDC_PG_MFG: NORMAL
MDC_IDC_PG_MODEL: NORMAL
MDC_IDC_PG_SERIAL: NORMAL
MDC_IDC_PG_TYPE: NORMAL
MDC_IDC_SESS_CLINIC_NAME: NORMAL
MDC_IDC_SESS_DTM: NORMAL
MDC_IDC_SESS_TYPE: NORMAL
MDC_IDC_SET_BRADY_HYSTRATE: NORMAL
MDC_IDC_SET_BRADY_LOWRATE: 40 {BEATS}/MIN
MDC_IDC_SET_BRADY_MODE: NORMAL
MDC_IDC_SET_LEADCHNL_RV_PACING_AMPLITUDE: 2 V
MDC_IDC_SET_LEADCHNL_RV_PACING_ANODE_ELECTRODE_1: NORMAL
MDC_IDC_SET_LEADCHNL_RV_PACING_ANODE_LOCATION_1: NORMAL
MDC_IDC_SET_LEADCHNL_RV_PACING_CAPTURE_MODE: NORMAL
MDC_IDC_SET_LEADCHNL_RV_PACING_CATHODE_ELECTRODE_1: NORMAL
MDC_IDC_SET_LEADCHNL_RV_PACING_CATHODE_LOCATION_1: NORMAL
MDC_IDC_SET_LEADCHNL_RV_PACING_POLARITY: NORMAL
MDC_IDC_SET_LEADCHNL_RV_PACING_PULSEWIDTH: 0.4 MS
MDC_IDC_SET_LEADCHNL_RV_SENSING_ANODE_ELECTRODE_1: NORMAL
MDC_IDC_SET_LEADCHNL_RV_SENSING_ANODE_LOCATION_1: NORMAL
MDC_IDC_SET_LEADCHNL_RV_SENSING_CATHODE_ELECTRODE_1: NORMAL
MDC_IDC_SET_LEADCHNL_RV_SENSING_CATHODE_LOCATION_1: NORMAL
MDC_IDC_SET_LEADCHNL_RV_SENSING_POLARITY: NORMAL
MDC_IDC_SET_LEADCHNL_RV_SENSING_SENSITIVITY: 0.45 MV
MDC_IDC_SET_ZONE_DETECTION_BEATS_DENOMINATOR: 16 {BEATS}
MDC_IDC_SET_ZONE_DETECTION_BEATS_DENOMINATOR: 32 {BEATS}
MDC_IDC_SET_ZONE_DETECTION_BEATS_DENOMINATOR: 40 {BEATS}
MDC_IDC_SET_ZONE_DETECTION_BEATS_NUMERATOR: 16 {BEATS}
MDC_IDC_SET_ZONE_DETECTION_BEATS_NUMERATOR: 30 {BEATS}
MDC_IDC_SET_ZONE_DETECTION_BEATS_NUMERATOR: 32 {BEATS}
MDC_IDC_SET_ZONE_DETECTION_INTERVAL: 240 MS
MDC_IDC_SET_ZONE_DETECTION_INTERVAL: 280 MS
MDC_IDC_SET_ZONE_DETECTION_INTERVAL: 330 MS
MDC_IDC_SET_ZONE_DETECTION_INTERVAL: 450 MS
MDC_IDC_SET_ZONE_STATUS: NORMAL
MDC_IDC_SET_ZONE_TYPE: NORMAL
MDC_IDC_SET_ZONE_VENDOR_TYPE: NORMAL
MDC_IDC_STAT_AT_BURDEN_PERCENT: 0 %
MDC_IDC_STAT_AT_DTM_END: NORMAL
MDC_IDC_STAT_AT_DTM_START: NORMAL
MDC_IDC_STAT_BRADY_DTM_END: NORMAL
MDC_IDC_STAT_BRADY_DTM_START: NORMAL
MDC_IDC_STAT_BRADY_RA_PERCENT_PACED: NORMAL
MDC_IDC_STAT_BRADY_RV_PERCENT_PACED: 0.14 %
MDC_IDC_STAT_CRT_DTM_END: NORMAL
MDC_IDC_STAT_CRT_DTM_START: NORMAL
MDC_IDC_STAT_EPISODE_RECENT_COUNT: 0
MDC_IDC_STAT_EPISODE_RECENT_COUNT_DTM_END: NORMAL
MDC_IDC_STAT_EPISODE_RECENT_COUNT_DTM_START: NORMAL
MDC_IDC_STAT_EPISODE_TOTAL_COUNT: 0
MDC_IDC_STAT_EPISODE_TOTAL_COUNT: 3
MDC_IDC_STAT_EPISODE_TOTAL_COUNT_DTM_END: NORMAL
MDC_IDC_STAT_EPISODE_TOTAL_COUNT_DTM_START: NORMAL
MDC_IDC_STAT_EPISODE_TYPE: NORMAL
MDC_IDC_STAT_TACHYTHERAPY_ATP_DELIVERED_RECENT: 0
MDC_IDC_STAT_TACHYTHERAPY_ATP_DELIVERED_TOTAL: 0
MDC_IDC_STAT_TACHYTHERAPY_RECENT_DTM_END: NORMAL
MDC_IDC_STAT_TACHYTHERAPY_RECENT_DTM_START: NORMAL
MDC_IDC_STAT_TACHYTHERAPY_SHOCKS_ABORTED_RECENT: 0
MDC_IDC_STAT_TACHYTHERAPY_SHOCKS_ABORTED_TOTAL: 0
MDC_IDC_STAT_TACHYTHERAPY_SHOCKS_DELIVERED_RECENT: 0
MDC_IDC_STAT_TACHYTHERAPY_SHOCKS_DELIVERED_TOTAL: 0
MDC_IDC_STAT_TACHYTHERAPY_TOTAL_DTM_END: NORMAL
MDC_IDC_STAT_TACHYTHERAPY_TOTAL_DTM_START: NORMAL

## 2025-01-13 NOTE — TELEPHONE ENCOUNTER
[facility] Call Center    Phone Message    May a detailed message be left on voicemail: yes     Reason for Call: Symptoms or Concerns     If patient has red-flag symptoms, warm transfer to triage line    Current symptom or concern: chest heaviness, pain when he swallows in sternum area, pain as well when he swallows, the Heart Clinic said to check PCC since they dont thinks its heart related per patients wife, they also did speak with triage and they did not tell them to go to ER, I did schedule something on Wednesday, but maybe there is a spot to be seen sooner or RADHA spot avail?      Symptoms have been present for:  4-5 day(s)    Has patient previously been seen for this? No    Are there any new or worsening symptoms? Yes: Not getting any better    Action Taken: Message routed to:  [facility] PCC    Travel Screening: Not Applicable     Date of Service:

## 2025-01-13 NOTE — PROGRESS NOTES
"D:  Pt called to report symptoms that began last week: lack of appetite, pain in the bottom of sternum with deep breaths, pain with swallowing \"once it hits that spot\", and discomfort gets worse when lying supine.  Pt denies shortness of breath.  Current LVAD numbers, Speed: 5100, Flow: 4.3, PI: 3.3, Power: 3.5, which are baseline for pt.  Pt reports no changes to volume status, took PRN Bumex about 4 days ago.  Pt requesting cardiology appt asap to assess new symptoms.  I:  Advised pt would be best evaluated for symptoms at primary care or ER/ urgent care.  Pt declines both these options at this time stating \"I would rather die than go to the ER.\"  [facility] changes his mind or symptoms worsen.    A:  Mychart follow up  P:  Pt verbalized understanding of the instructions given.  Will call VAD coordinator with further needs and questions.      "

## 2025-01-14 NOTE — TELEPHONE ENCOUNTER
The [facility] RN attempted to call to speak with the patient, but the RN wasn't able to speak with the patient (& the RN LVM with the [facility] phone number for the patient to call back).     The RN then called and spoke with the patient's spouse, Landry (consent to communicate on file). Per Landry, the patient has had more pain with swallowing which the patient stated to Landry is not like the chest pain he had when something was wrong with his heart in the past. Landry also expressed concern last night the patient had a fever=100.5 F and developed a headache, but that COVID test last night was negative. The [facility] RN reviewed with Landry that [facility] RN agreed with WILDER Sloan of cardiology's recommendation to seek care in the ER or Urgent Care today (see MyChart encounter dated 1/13/2025). The [facility] RN also reviewed with Landry that the best/#1 recommendation would be for the patient to seek care in the ER today, but that if the patient refused to go to the ER then the second best option would be seeking care at Urgent Care today and the third best option would be to wait until the appointment in clinic tomorrow (appointment scheduled on 1/15/2025 at 5:30 PM with Gema Fatima NP). The RN acknowledged that while the [facility] RN respects that it is ultimately the patient/patient's spouse's decision about when and where to seek care, but the RN also educated the patient's spouse that there were limitations to how much the patient's care team can help over the phone and that therefore it was the [facility] RN's recommendation to seek care today to rule in/out a more concerning/serious etiology of the patient's symptoms. The patient's spouse verbalized understanding of the RN's recommendations.

## 2025-01-15 ENCOUNTER — CARE COORDINATION (OUTPATIENT)
Dept: CARDIOLOGY | Facility: CLINIC | Age: 60
End: 2025-01-15

## 2025-01-15 ENCOUNTER — APPOINTMENT (OUTPATIENT)
Dept: GENERAL RADIOLOGY | Facility: CLINIC | Age: 60
End: 2025-01-15
Attending: FAMILY MEDICINE
Payer: COMMERCIAL

## 2025-01-15 ENCOUNTER — ANCILLARY PROCEDURE (OUTPATIENT)
Dept: CARDIOLOGY | Facility: CLINIC | Age: 60
End: 2025-01-15
Attending: INTERNAL MEDICINE
Payer: COMMERCIAL

## 2025-01-15 ENCOUNTER — HOSPITAL ENCOUNTER (EMERGENCY)
Facility: CLINIC | Age: 60
Discharge: HOME | End: 2025-01-15
Attending: FAMILY MEDICINE
Payer: COMMERCIAL

## 2025-01-15 VITALS — TEMPERATURE: 98.5 F | RESPIRATION RATE: 16 BRPM | OXYGEN SATURATION: 95 % | HEART RATE: 88 BPM

## 2025-01-15 DIAGNOSIS — Z95.811 LVAD (LEFT VENTRICULAR ASSIST DEVICE) PRESENT (H): ICD-10-CM

## 2025-01-15 DIAGNOSIS — Z87.898 HISTORY OF FEVER: ICD-10-CM

## 2025-01-15 DIAGNOSIS — I48.0 INTERMITTENT ATRIAL FIBRILLATION (H): ICD-10-CM

## 2025-01-15 DIAGNOSIS — I42.8 CARDIOMYOPATHY, NONISCHEMIC (H): ICD-10-CM

## 2025-01-15 DIAGNOSIS — I42.9 CARDIOMYOPATHY (H): ICD-10-CM

## 2025-01-15 DIAGNOSIS — Z95.810 ICD (IMPLANTABLE CARDIOVERTER-DEFIBRILLATOR) IN PLACE: ICD-10-CM

## 2025-01-15 LAB
ALBUMIN SERPL BCG-MCNC: 4.2 G/DL (ref 3.5–5.2)
ALP SERPL-CCNC: 95 U/L (ref 40–150)
ALT SERPL W P-5'-P-CCNC: 31 U/L (ref 0–70)
ANION GAP SERPL CALCULATED.3IONS-SCNC: 12 MMOL/L (ref 7–15)
APTT PPP: 43 SECONDS (ref 22–38)
AST SERPL W P-5'-P-CCNC: 32 U/L (ref 0–45)
BASOPHILS # BLD AUTO: 0.1 10E3/UL (ref 0–0.2)
BASOPHILS NFR BLD AUTO: 0 %
BILIRUB SERPL-MCNC: 0.5 MG/DL
BUN SERPL-MCNC: 18.6 MG/DL (ref 6–20)
CALCIUM SERPL-MCNC: 9.4 MG/DL (ref 8.8–10.4)
CHLORIDE SERPL-SCNC: 97 MMOL/L (ref 98–107)
CREAT SERPL-MCNC: 1.22 MG/DL (ref 0.67–1.17)
EGFRCR SERPLBLD CKD-EPI 2021: 69 ML/MIN/1.73M2
EOSINOPHIL # BLD AUTO: 0.1 10E3/UL (ref 0–0.7)
EOSINOPHIL NFR BLD AUTO: 1 %
ERYTHROCYTE [DISTWIDTH] IN BLOOD BY AUTOMATED COUNT: 15.4 % (ref 10–15)
FLUAV RNA SPEC QL NAA+PROBE: NEGATIVE
FLUBV RNA RESP QL NAA+PROBE: NEGATIVE
GLUCOSE SERPL-MCNC: 96 MG/DL (ref 70–99)
HCO3 SERPL-SCNC: 27 MMOL/L (ref 22–29)
HCT VFR BLD AUTO: 45.8 % (ref 40–53)
HGB BLD-MCNC: 13.7 G/DL (ref 13.3–17.7)
IMM GRANULOCYTES # BLD: 0.2 10E3/UL
IMM GRANULOCYTES NFR BLD: 1 %
INR PPP: 1.73 (ref 0.85–1.15)
LACTATE SERPL-SCNC: 1.3 MMOL/L (ref 0.7–2)
LIPASE SERPL-CCNC: 23 U/L (ref 13–60)
LYMPHOCYTES # BLD AUTO: 2.3 10E3/UL (ref 0.8–5.3)
LYMPHOCYTES NFR BLD AUTO: 15 %
MAGNESIUM SERPL-MCNC: 2 MG/DL (ref 1.7–2.3)
MCH RBC QN AUTO: 27.3 PG (ref 26.5–33)
MCHC RBC AUTO-ENTMCNC: 29.9 G/DL (ref 31.5–36.5)
MCV RBC AUTO: 91 FL (ref 78–100)
MONOCYTES # BLD AUTO: 1.2 10E3/UL (ref 0–1.3)
MONOCYTES NFR BLD AUTO: 8 %
NEUTROPHILS # BLD AUTO: 11.9 10E3/UL (ref 1.6–8.3)
NEUTROPHILS NFR BLD AUTO: 76 %
NRBC # BLD AUTO: 0 10E3/UL
NRBC BLD AUTO-RTO: 0 /100
PLATELET # BLD AUTO: 304 10E3/UL (ref 150–450)
POTASSIUM SERPL-SCNC: 4.1 MMOL/L (ref 3.4–5.3)
PROT SERPL-MCNC: 7.6 G/DL (ref 6.4–8.3)
RBC # BLD AUTO: 5.01 10E6/UL (ref 4.4–5.9)
RSV RNA SPEC NAA+PROBE: NEGATIVE
SARS-COV-2 RNA RESP QL NAA+PROBE: NEGATIVE
SODIUM SERPL-SCNC: 136 MMOL/L (ref 135–145)
TSH SERPL DL<=0.005 MIU/L-ACNC: 3.14 UIU/ML (ref 0.3–4.2)
WBC # BLD AUTO: 15.7 10E3/UL (ref 4–11)

## 2025-01-15 PROCEDURE — 83690 ASSAY OF LIPASE: CPT | Performed by: FAMILY MEDICINE

## 2025-01-15 PROCEDURE — 71045 X-RAY EXAM CHEST 1 VIEW: CPT | Mod: 26 | Performed by: RADIOLOGY

## 2025-01-15 PROCEDURE — 84450 TRANSFERASE (AST) (SGOT): CPT | Performed by: FAMILY MEDICINE

## 2025-01-15 PROCEDURE — 82310 ASSAY OF CALCIUM: CPT | Performed by: FAMILY MEDICINE

## 2025-01-15 PROCEDURE — 85730 THROMBOPLASTIN TIME PARTIAL: CPT | Performed by: FAMILY MEDICINE

## 2025-01-15 PROCEDURE — 87637 SARSCOV2&INF A&B&RSV AMP PRB: CPT | Performed by: FAMILY MEDICINE

## 2025-01-15 PROCEDURE — 85610 PROTHROMBIN TIME: CPT | Performed by: FAMILY MEDICINE

## 2025-01-15 PROCEDURE — 87040 BLOOD CULTURE FOR BACTERIA: CPT | Performed by: FAMILY MEDICINE

## 2025-01-15 PROCEDURE — 71045 X-RAY EXAM CHEST 1 VIEW: CPT

## 2025-01-15 PROCEDURE — 83605 ASSAY OF LACTIC ACID: CPT | Performed by: FAMILY MEDICINE

## 2025-01-15 PROCEDURE — 36415 COLL VENOUS BLD VENIPUNCTURE: CPT | Performed by: FAMILY MEDICINE

## 2025-01-15 PROCEDURE — 85041 AUTOMATED RBC COUNT: CPT | Performed by: FAMILY MEDICINE

## 2025-01-15 PROCEDURE — 84443 ASSAY THYROID STIM HORMONE: CPT | Performed by: FAMILY MEDICINE

## 2025-01-15 PROCEDURE — 93295 DEV INTERROG REMOTE 1/2/MLT: CPT | Performed by: INTERNAL MEDICINE

## 2025-01-15 PROCEDURE — 99285 EMERGENCY DEPT VISIT HI MDM: CPT | Mod: 25 | Performed by: FAMILY MEDICINE

## 2025-01-15 PROCEDURE — 85004 AUTOMATED DIFF WBC COUNT: CPT | Performed by: FAMILY MEDICINE

## 2025-01-15 PROCEDURE — 99285 EMERGENCY DEPT VISIT HI MDM: CPT | Performed by: FAMILY MEDICINE

## 2025-01-15 PROCEDURE — 93005 ELECTROCARDIOGRAM TRACING: CPT | Performed by: FAMILY MEDICINE

## 2025-01-15 PROCEDURE — 83735 ASSAY OF MAGNESIUM: CPT | Performed by: FAMILY MEDICINE

## 2025-01-15 PROCEDURE — 93010 ELECTROCARDIOGRAM REPORT: CPT | Performed by: FAMILY MEDICINE

## 2025-01-15 RX ORDER — LIDOCAINE 40 MG/G
CREAM TOPICAL
Status: DISCONTINUED | OUTPATIENT
Start: 2025-01-15 | End: 2025-01-16 | Stop reason: HOSPADM

## 2025-01-15 ASSESSMENT — COLUMBIA-SUICIDE SEVERITY RATING SCALE - C-SSRS
6. HAVE YOU EVER DONE ANYTHING, STARTED TO DO ANYTHING, OR PREPARED TO DO ANYTHING TO END YOUR LIFE?: NO
1. IN THE PAST MONTH, HAVE YOU WISHED YOU WERE DEAD OR WISHED YOU COULD GO TO SLEEP AND NOT WAKE UP?: NO
2. HAVE YOU ACTUALLY HAD ANY THOUGHTS OF KILLING YOURSELF IN THE PAST MONTH?: NO

## 2025-01-15 ASSESSMENT — ACTIVITIES OF DAILY LIVING (ADL)
ADLS_ACUITY_SCORE: 63

## 2025-01-16 ENCOUNTER — ANTICOAGULATION THERAPY VISIT (OUTPATIENT)
Dept: ANTICOAGULATION | Facility: CLINIC | Age: 60
End: 2025-01-16
Payer: COMMERCIAL

## 2025-01-16 ENCOUNTER — CARE COORDINATION (OUTPATIENT)
Dept: CARDIOLOGY | Facility: CLINIC | Age: 60
End: 2025-01-16
Payer: COMMERCIAL

## 2025-01-16 ENCOUNTER — ANCILLARY PROCEDURE (OUTPATIENT)
Dept: CARDIOLOGY | Facility: CLINIC | Age: 60
End: 2025-01-16
Attending: INTERNAL MEDICINE
Payer: COMMERCIAL

## 2025-01-16 DIAGNOSIS — Z79.899 LONG TERM USE OF DRUG: ICD-10-CM

## 2025-01-16 DIAGNOSIS — I50.22 CHRONIC SYSTOLIC CONGESTIVE HEART FAILURE (H): ICD-10-CM

## 2025-01-16 DIAGNOSIS — Z95.811 LVAD (LEFT VENTRICULAR ASSIST DEVICE) PRESENT (H): ICD-10-CM

## 2025-01-16 DIAGNOSIS — I42.9 CARDIOMYOPATHY (H): ICD-10-CM

## 2025-01-16 DIAGNOSIS — I50.23 ACUTE ON CHRONIC SYSTOLIC CONGESTIVE HEART FAILURE (H): Primary | ICD-10-CM

## 2025-01-16 DIAGNOSIS — Z95.811 LEFT VENTRICULAR ASSIST DEVICE PRESENT (H): ICD-10-CM

## 2025-01-16 DIAGNOSIS — Z79.01 ANTICOAGULATED ON COUMADIN: ICD-10-CM

## 2025-01-16 LAB
ATRIAL RATE - MUSE: 92 BPM
BACTERIA BLD CULT: NORMAL
BACTERIA BLD CULT: NORMAL
DIASTOLIC BLOOD PRESSURE - MUSE: NORMAL MMHG
INTERPRETATION ECG - MUSE: NORMAL
MDC_IDC_EPISODE_DTM: NORMAL
MDC_IDC_EPISODE_DURATION: 1680 S
MDC_IDC_EPISODE_DURATION: 2760 S
MDC_IDC_EPISODE_DURATION: 360 S
MDC_IDC_EPISODE_DURATION: 3960 S
MDC_IDC_EPISODE_DURATION: 4200 S
MDC_IDC_EPISODE_DURATION: 4440 S
MDC_IDC_EPISODE_DURATION: 720 S
MDC_IDC_EPISODE_DURATION: 9720 S
MDC_IDC_EPISODE_ID: 10
MDC_IDC_EPISODE_ID: 11
MDC_IDC_EPISODE_ID: 4
MDC_IDC_EPISODE_ID: 5
MDC_IDC_EPISODE_ID: 6
MDC_IDC_EPISODE_ID: 7
MDC_IDC_EPISODE_ID: 8
MDC_IDC_EPISODE_ID: 9
MDC_IDC_EPISODE_TYPE: NORMAL
MDC_IDC_LEAD_CONNECTION_STATUS: NORMAL
MDC_IDC_LEAD_IMPLANT_DT: NORMAL
MDC_IDC_LEAD_LOCATION: NORMAL
MDC_IDC_LEAD_LOCATION_DETAIL_1: NORMAL
MDC_IDC_LEAD_MFG: NORMAL
MDC_IDC_LEAD_MODEL: NORMAL
MDC_IDC_LEAD_POLARITY_TYPE: NORMAL
MDC_IDC_LEAD_SERIAL: NORMAL
MDC_IDC_LEAD_SPECIAL_FUNCTION: NORMAL
MDC_IDC_MSMT_BATTERY_DTM: NORMAL
MDC_IDC_MSMT_BATTERY_REMAINING_LONGEVITY: 168 MO
MDC_IDC_MSMT_BATTERY_RRT_TRIGGER: NORMAL
MDC_IDC_MSMT_BATTERY_STATUS: NORMAL
MDC_IDC_MSMT_BATTERY_VOLTAGE: 3.09 V
MDC_IDC_MSMT_CAP_CHARGE_DTM: NORMAL
MDC_IDC_MSMT_CAP_CHARGE_ENERGY: 18 J
MDC_IDC_MSMT_CAP_CHARGE_TIME: 3.5 S
MDC_IDC_MSMT_CAP_CHARGE_TYPE: NORMAL
MDC_IDC_MSMT_LEADCHNL_RV_IMPEDANCE_VALUE: 285 OHM
MDC_IDC_MSMT_LEADCHNL_RV_IMPEDANCE_VALUE: 399 OHM
MDC_IDC_MSMT_LEADCHNL_RV_PACING_THRESHOLD_AMPLITUDE: 1 V
MDC_IDC_MSMT_LEADCHNL_RV_PACING_THRESHOLD_PULSEWIDTH: 0.4 MS
MDC_IDC_MSMT_LEADCHNL_RV_SENSING_INTR_AMPL: 13.6 MV
MDC_IDC_PG_IMPLANT_DTM: NORMAL
MDC_IDC_PG_MFG: NORMAL
MDC_IDC_PG_MODEL: NORMAL
MDC_IDC_PG_SERIAL: NORMAL
MDC_IDC_PG_TYPE: NORMAL
MDC_IDC_SESS_CLINIC_NAME: NORMAL
MDC_IDC_SESS_DTM: NORMAL
MDC_IDC_SESS_TYPE: NORMAL
MDC_IDC_SET_BRADY_HYSTRATE: NORMAL
MDC_IDC_SET_BRADY_LOWRATE: 40 {BEATS}/MIN
MDC_IDC_SET_BRADY_MODE: NORMAL
MDC_IDC_SET_LEADCHNL_RV_PACING_AMPLITUDE: 2 V
MDC_IDC_SET_LEADCHNL_RV_PACING_ANODE_ELECTRODE_1: NORMAL
MDC_IDC_SET_LEADCHNL_RV_PACING_ANODE_LOCATION_1: NORMAL
MDC_IDC_SET_LEADCHNL_RV_PACING_CAPTURE_MODE: NORMAL
MDC_IDC_SET_LEADCHNL_RV_PACING_CATHODE_ELECTRODE_1: NORMAL
MDC_IDC_SET_LEADCHNL_RV_PACING_CATHODE_LOCATION_1: NORMAL
MDC_IDC_SET_LEADCHNL_RV_PACING_POLARITY: NORMAL
MDC_IDC_SET_LEADCHNL_RV_PACING_PULSEWIDTH: 0.4 MS
MDC_IDC_SET_LEADCHNL_RV_SENSING_ANODE_ELECTRODE_1: NORMAL
MDC_IDC_SET_LEADCHNL_RV_SENSING_ANODE_LOCATION_1: NORMAL
MDC_IDC_SET_LEADCHNL_RV_SENSING_CATHODE_ELECTRODE_1: NORMAL
MDC_IDC_SET_LEADCHNL_RV_SENSING_CATHODE_LOCATION_1: NORMAL
MDC_IDC_SET_LEADCHNL_RV_SENSING_POLARITY: NORMAL
MDC_IDC_SET_LEADCHNL_RV_SENSING_SENSITIVITY: 0.45 MV
MDC_IDC_SET_ZONE_DETECTION_BEATS_DENOMINATOR: 16 {BEATS}
MDC_IDC_SET_ZONE_DETECTION_BEATS_DENOMINATOR: 32 {BEATS}
MDC_IDC_SET_ZONE_DETECTION_BEATS_DENOMINATOR: 40 {BEATS}
MDC_IDC_SET_ZONE_DETECTION_BEATS_NUMERATOR: 16 {BEATS}
MDC_IDC_SET_ZONE_DETECTION_BEATS_NUMERATOR: 30 {BEATS}
MDC_IDC_SET_ZONE_DETECTION_BEATS_NUMERATOR: 32 {BEATS}
MDC_IDC_SET_ZONE_DETECTION_INTERVAL: 240 MS
MDC_IDC_SET_ZONE_DETECTION_INTERVAL: 280 MS
MDC_IDC_SET_ZONE_DETECTION_INTERVAL: 330 MS
MDC_IDC_SET_ZONE_DETECTION_INTERVAL: 450 MS
MDC_IDC_SET_ZONE_STATUS: NORMAL
MDC_IDC_SET_ZONE_TYPE: NORMAL
MDC_IDC_SET_ZONE_VENDOR_TYPE: NORMAL
MDC_IDC_STAT_AT_BURDEN_PERCENT: 7.83 %
MDC_IDC_STAT_AT_DTM_END: NORMAL
MDC_IDC_STAT_AT_DTM_START: NORMAL
MDC_IDC_STAT_BRADY_DTM_END: NORMAL
MDC_IDC_STAT_BRADY_DTM_START: NORMAL
MDC_IDC_STAT_BRADY_RV_PERCENT_PACED: 0.04 %
MDC_IDC_STAT_CRT_DTM_END: NORMAL
MDC_IDC_STAT_CRT_DTM_START: NORMAL
MDC_IDC_STAT_EPISODE_RECENT_COUNT: 0
MDC_IDC_STAT_EPISODE_RECENT_COUNT_DTM_END: NORMAL
MDC_IDC_STAT_EPISODE_RECENT_COUNT_DTM_START: NORMAL
MDC_IDC_STAT_EPISODE_TOTAL_COUNT: 0
MDC_IDC_STAT_EPISODE_TOTAL_COUNT: 3
MDC_IDC_STAT_EPISODE_TOTAL_COUNT_DTM_END: NORMAL
MDC_IDC_STAT_EPISODE_TOTAL_COUNT_DTM_START: NORMAL
MDC_IDC_STAT_EPISODE_TYPE: NORMAL
MDC_IDC_STAT_TACHYTHERAPY_ATP_DELIVERED_RECENT: 0
MDC_IDC_STAT_TACHYTHERAPY_ATP_DELIVERED_TOTAL: 0
MDC_IDC_STAT_TACHYTHERAPY_RECENT_DTM_END: NORMAL
MDC_IDC_STAT_TACHYTHERAPY_RECENT_DTM_START: NORMAL
MDC_IDC_STAT_TACHYTHERAPY_SHOCKS_ABORTED_RECENT: 0
MDC_IDC_STAT_TACHYTHERAPY_SHOCKS_ABORTED_TOTAL: 0
MDC_IDC_STAT_TACHYTHERAPY_SHOCKS_DELIVERED_RECENT: 0
MDC_IDC_STAT_TACHYTHERAPY_SHOCKS_DELIVERED_TOTAL: 0
MDC_IDC_STAT_TACHYTHERAPY_TOTAL_DTM_END: NORMAL
MDC_IDC_STAT_TACHYTHERAPY_TOTAL_DTM_START: NORMAL
P AXIS - MUSE: NORMAL DEGREES
PR INTERVAL - MUSE: 160 MS
QRS DURATION - MUSE: 146 MS
QT - MUSE: 418 MS
QTC - MUSE: 516 MS
R AXIS - MUSE: 198 DEGREES
SYSTOLIC BLOOD PRESSURE - MUSE: NORMAL MMHG
T AXIS - MUSE: 87 DEGREES
VENTRICULAR RATE- MUSE: 92 BPM

## 2025-01-16 PROCEDURE — 99207 CARDIAC DEVICE CHECK - REMOTE: CPT | Performed by: INTERNAL MEDICINE

## 2025-01-16 NOTE — PROGRESS NOTES
D: patient's wife paged on-call coordinator regarding coumadin dosing.     I/A: was told to take additional 2.5mg of coumadin tonight by ED doctor but are unsure what to do.     P: discussed with dr. Aguilar who agrees have patient take 2.5mg additional coumadin tonight and follow up with coumadin clinic tomorrow.  Family notified to page on-call coordinator if symptoms worsen or with other concerns. Family verbalized understanding.

## 2025-01-16 NOTE — PROGRESS NOTES
Called pt to check in post ER discharge yesterday and to follow up to [employer] message sent today regarding recent device transmission.  Pt did not answer.  Voicemail left requesting return call to discuss.  FTL SOLARt message forwarded to EP team to address recent changes in rhythm.

## 2025-01-16 NOTE — PROGRESS NOTES
Received call from patient's wife stating patient has not been feeling well- she thinks due to A fib. Says patient has been feeling poorly all week and has been in communication with vad care coordinator. No lvad alarms.     Will update cardiologist on call, primary coordinator and cardiologist.

## 2025-01-16 NOTE — ED TRIAGE NOTES
Pt notified by device clinic that he is in atrial fib. PT has felt dizzy, low grade fevers since Monday. LVAD HM3.     Triage Assessment (Adult)       Row Name 01/15/25 [id number]          Triage Assessment    Airway WDL WDL        Respiratory WDL    Respiratory WDL WDL        Skin Circulation/Temperature WDL    Skin Circulation/Temperature WDL WDL        Cardiac WDL    Cardiac WDL X     Cardiac Rhythm Atrial fibrillation        Peripheral/Neurovascular WDL    Peripheral Neurovascular WDL WDL        Cognitive/Neuro/Behavioral WDL    Cognitive/Neuro/Behavioral WDL WDL

## 2025-01-16 NOTE — ED PROVIDER NOTES
ED Provider Note  [facility]      History     Chief Complaint   Patient presents with    Irregular Heart Beat     HPI  Akshat Fragoso is a 59 year old male with history of heart failure with reduced ejection fraction secondary to nonischemic ischemia status post ICD placement (2017), VT/VF arrest in 2023 requiring 6 minutes of CPR with cardiogenci shock status post HM3 LVAD implantation complicated by post-operative A-fib (on amiodarone and digoxin), moderate tricuspid regurgitation status post TV annuloplasty, PFO closure, hypertension, COPD, and stage 3 CKD. Patient presents to the emergency department from his clinic with atrial fibrillation.  Patient noted low-grade fever this weekend without major cough or other symptoms.  Fever got away.  Patient no notes intermittent lightheadedness patient noted today and interrogation of his device that he was in intermittent A-fib throughout the last few days.  Recommended to come in for evaluation patient denies any chest pain otherwise no shortness of breath he had a slight chest discomfort that went away couple areas nonspecific.  No dysuric symptoms no diarrhea no nausea vomiting no rash or joint swellings patient to take his medications now presents valuation with his wife.  Denies syncope otherwise no neurochanges.  No headache.  No concerns for driveline infection.    Past Medical History  Past Medical History:   Diagnosis Date    Acute right lumbar radiculopathy 11/02/2015    Acute systolic heart failure (H) 01/10/2017    Cardiomyopathy (H) 01/10/2017    CKD (chronic kidney disease) stage 3, GFR 30-59 ml/min (H) 01/30/2020    JOHNSON (dyspnea on exertion)     ED (erectile dysfunction) 10/02/2019    Erectile dysfunction     ICD (implantable cardioverter-defibrillator) in place- [employer], single chamber- NOT dependent 10/09/2017    Personal history of smoking 01/04/2017    Pulmonary nodules 01/17/2017    CT 11/2018:  Bilateral  pulmonary nodules measuring up to 4 mm. No new or enlarging pulmonary nodules.     Past Surgical History:   Procedure Laterality Date    CARDIAC SURGERY  2016    defibrillator placement    COLONOSCOPY N/A 3/22/2023    Procedure: Colonoscopy;  Surgeon: Khushboo Cerspo MD;  Location:  GI    CV INTRA AORTIC BALLOON N/A 3/22/2023    Procedure: Intraprocedure Aortic Balloon Pump Insertion;  Surgeon: Danie Snyder MD;  Location:  HEART CARDIAC CATH LAB    CV RIGHT HEART CATH MEASUREMENTS RECORDED N/A 11/20/2019    Procedure: CV RIGHT HEART CATH;  Surgeon: Jeff Aguilar MD;  Location:  HEART CARDIAC CATH LAB    CV RIGHT HEART CATH MEASUREMENTS RECORDED N/A 3/22/2023    Procedure: Right Heart Catheterization;  Surgeon: Danie Snyder MD;  Location:  HEART CARDIAC CATH LAB    CV RIGHT HEART CATH MEASUREMENTS RECORDED N/A 8/7/2023    Procedure: Heart Cath Right Heart Cath;  Surgeon: Paras Pang MD;  Location:  HEART CARDIAC CATH LAB    CV RIGHT HEART CATH MEASUREMENTS RECORDED N/A 4/1/2024    Procedure: Heart Cath Right Heart Cath;  Surgeon: Fadi Solares MD;  Location:  HEART CARDIAC CATH LAB    EP PACEMAKER OR ICD LEAD IMPLANT-ONE LEAD N/A 1/4/2024    Procedure: Pacemaker or Implantable Cardioverter Defibrillator Lead Implant - One Lead;  Surgeon: Charles Montgomery MD;  Location:  HEART CARDIAC CATH LAB    EP PACEMAKER OR ICD LEAD RELOCATION-RIGHT ATRIAL OR RIGHT VENTRICULAR N/A 9/5/2024    Procedure: Pacemaker or Implantable Cardioverter Defibrillator Lead Relocation- Right Atrial or Right Ventricular;  Surgeon: Charles Montgomery MD;  Location:  HEART CARDIAC CATH LAB    EP SUBCLAVIAN  VENOGRAM N/A 9/4/2024    Procedure: Subclavian Venogram;  Surgeon: Charles Montgomery MD;  Location:  HEART CARDIAC CATH LAB    INSERT VENTRICULAR ASSIST DEVICE LEFT (HEARTMATE II) N/A 3/23/2023    Procedure: MEDIAN STERNOTOMY, TRANSESOPHAGEAL ECHOCARDIOGRAM PER  ANESTHESIA, CARDIOPULMONARY BYPASS PUMP, INSERTION, LEFT VENTRICULAR ASSIST DEVICE (HEARTMATE IIl), TRICUSPID VALVE REPAIR WITH EDWRDS 32 MM MC3 TRICUSPID ANNULOPLASTY RING;  Surgeon: Elena Matias MD;  Location:  OR    None       acetaminophen (TYLENOL) 325 MG tablet  amiodarone (PACERONE) 200 MG tablet  amoxicillin (AMOXIL) 500 MG tablet  bumetanide (BUMEX) 1 MG tablet  digoxin (LANOXIN) 250 MCG tablet  empagliflozin (JARDIANCE) 10 MG TABS tablet  eplerenone (INSPRA) 25 MG tablet  JANTOVEN ANTICOAGULANT 5 MG tablet  lisinopril (ZESTRIL) 5 MG tablet  oxyCODONE-acetaminophen (PERCOCET) 5-325 MG tablet  oxyCODONE-acetaminophen (PERCOCET) 5-325 MG tablet  pantoprazole (PROTONIX) 20 MG EC tablet  sildenafil (VIAGRA) 50 MG tablet      Allergies   Allergen Reactions    Codeine      Other reaction(s): GI intolerance, Intolerance-Can't Take     Family History  Family History   Problem Relation Age of Onset    Parkinsonism Mother     Atrial fibrillation Father     Heart Failure Father     Prostate Cancer Father     Skin Cancer Father     Anorexia nervosa Daughter     Other - See Comments Granddaughter         premature birth     Social History   Social History     Tobacco Use    Smoking status: Former     Current packs/day: 0.25     Average packs/day: 0.3 packs/day for 15.0 years (3.8 ttl pk-yrs)     Types: Cigarettes    Smokeless tobacco: Former     Quit date: 3/15/2023   Vaping Use    Vaping status: Never Used   Substance Use Topics    Alcohol use: No     Alcohol/week: 0.0 standard drinks of alcohol    Drug use: No      A medically appropriate review of systems was performed with pertinent positives and negatives noted in the HPI, and all other systems negative.    Physical Exam   Pulse: 57  Temp: 98.1  F (36.7  C)  Resp: 18  SpO2: 94 %  Physical Exam  Vitals and nursing note reviewed.   Constitutional:       General: He is in acute distress.      Appearance: He is well-developed. He is not diaphoretic.   HENT:       Head: Normocephalic and atraumatic.      Nose: Nose normal. No congestion.      Mouth/Throat:      Mouth: Mucous membranes are moist.      Pharynx: Oropharynx is clear.   Eyes:      General: No scleral icterus.     Extraocular Movements: Extraocular movements intact.      Conjunctiva/sclera: Conjunctivae normal.      Pupils: Pupils are equal, round, and reactive to light.   Cardiovascular:      Rate and Rhythm: Normal rate and regular rhythm.      Comments: Regular rhythm by monitor LVAD hum heard  Pulmonary:      Effort: Pulmonary effort is normal. No respiratory distress.      Breath sounds: No stridor. No wheezing.   Abdominal:      General: Abdomen is flat. There is no distension.      Tenderness: There is no abdominal tenderness. There is no guarding.   Musculoskeletal:         General: No tenderness or deformity. Normal range of motion.      Cervical back: Normal range of motion and neck supple.   Lymphadenopathy:      Cervical: No cervical adenopathy.   Skin:     General: Skin is warm and dry.      Capillary Refill: Capillary refill takes less than 2 seconds.      Findings: No rash.   Neurological:      General: No focal deficit present.      Mental Status: He is alert and oriented to person, place, and time.      Cranial Nerves: No cranial nerve deficit.      Sensory: No sensory deficit.      Coordination: Coordination normal.   Psychiatric:      Comments: appropriate           ED Course, Procedures, & Data      Records are in [facility].  Refer to HPI also.  Patient evaluated here wife present also gives clinical information.  Patient vitally stable LVAD findings noted below.    Patient is elevated readings noted 5100 Speed/4 flow/3.6 power/ 4.3 PI    In the ER we did do an EKG patient monitor appears to be in a regular rhythm without tachycardia.  COVID influenza RSV were negative.  TSH 3.14.  Lipase 23.  Sodium 136 potassium 4.1.  Bicarb 27 gap is 12 creatinine 1.22.  Glucose 96 LFTs normal limits white  count 15.7 slightly elevated hemoglobin 13.7 INR 1.73  Lactic acid 1.3 also.    Chest x-ray done interpreted by myself also reveals no acute infiltrate seen LVAD device identified.  No effusions pneumothorax.    As noted EKG noted below.  To be sinus rhythm with nonspecific interventricular conduction delay.    Here in the ER patient otherwise stable    Discussed the case with cards staff .   Initially will plan admit the patient as observation overnight regular draw 2 sets of blood cultures.  Patient and wife want to go home patient otherwise feeling fine they live 5 minutes away they have a follow-up ointment on Friday.  At this point discussed with cardiology they agree with plan.    I did talk to the patient regarding his INR at this point he wanted to wait and talk to the coordinator tomorrow about adjusting medications he taken his evening dose to 5 mg a day tomorrow typically takes 2-1/2.  I did offer him to take an extra 2-1/2 now but he wants to hold off they will check with them tomorrow.    Patient this point will return if any concerns of increasing lightheadedness any concerns of increasing significant fevers or other concerns.  As noted planning to follow-up the other coordinator will contact patient tomorrow in follow-up regarding INR and symptoms and other recommendations.        Procedures            EKG Interpretation:      Interpreted by Neftali Mcintyre MD  Time reviewed: 1848  Symptoms at time of EKG: light headedness   Rhythm: normal sinus   Rate: normal  Axis: normal  Ectopy: none  Conduction: nsivcd  ST Segments/ T Waves: Nonspecific ST-T wave changes  Q Waves: none  Comparison to prior: No old EKG available    Clinical Impression: nsr with nsivcd           Results for orders placed or performed during the hospital encounter of 01/15/25   XR Chest Port 1 View     Status: None    Narrative    EXAM: XR CHEST PORT 1 VIEW  LOCATION: [facility]  CENTER  DATE: 1/15/2025    INDICATION: Chest pain. Fever.  COMPARISON: 9/6/2024.      Impression    IMPRESSION: No significant interval change. Sternotomy. Left ventricular assist device. Single lead cardiac device left chest wall, with lead tip projected over the RV. Heart size is stable. Pulmonary vascularity is within normal limits. Lungs clear.   Partial thromboplastin time     Status: Abnormal   Result Value Ref Range    aPTT 43 (H) 22 - 38 Seconds   Comprehensive metabolic panel     Status: Abnormal   Result Value Ref Range    Sodium 136 135 - 145 mmol/L    Potassium 4.1 3.4 - 5.3 mmol/L    Carbon Dioxide (CO2) 27 22 - 29 mmol/L    Anion Gap 12 7 - 15 mmol/L    Urea Nitrogen 18.6 6.0 - 20.0 mg/dL    Creatinine 1.22 (H) 0.67 - 1.17 mg/dL    GFR Estimate 69 >60 mL/min/1.73m2    Calcium 9.4 8.8 - 10.4 mg/dL    Chloride 97 (L) 98 - 107 mmol/L    Glucose 96 70 - 99 mg/dL    Alkaline Phosphatase 95 40 - 150 U/L    AST 32 0 - 45 U/L    ALT 31 0 - 70 U/L    Protein Total 7.6 6.4 - 8.3 g/dL    Albumin 4.2 3.5 - 5.2 g/dL    Bilirubin Total 0.5 <=1.2 mg/dL   Magnesium     Status: Normal   Result Value Ref Range    Magnesium 2.0 1.7 - 2.3 mg/dL   TSH     Status: Normal   Result Value Ref Range    TSH 3.14 0.30 - 4.20 uIU/mL   INR     Status: Abnormal   Result Value Ref Range    INR 1.73 (H) 0.85 - 1.15   Lipase     Status: Normal   Result Value Ref Range    Lipase 23 13 - 60 U/L   Lactic Acid Whole Blood with 1X Repeat in 2 HR when >2     Status: Normal   Result Value Ref Range    Lactic Acid, Initial 1.3 0.7 - 2.0 mmol/L   Influenza A/B, RSV and SARS-CoV2 PCR (COVID-19) Nasopharyngeal     Status: Normal    Specimen: Nasopharyngeal; Swab   Result Value Ref Range    Influenza A PCR Negative Negative    Influenza B PCR Negative Negative    RSV PCR Negative Negative    SARS CoV2 PCR Negative Negative    Narrative    Testing was performed using the Xpert Xpress CoV2/Flu/RSV Assay on the [employer]pert Instrument. This test  should be ordered for the detection of SARS-CoV2, influenza, and RSV viruses in individuals with signs and symptoms of respiratory tract infection. This test is for in vitro diagnostic use under the [place] FDA for laboratories certified under CLIA to perform high or moderate complexity testing. This test has been [place] FDA cleared. A negative result does not rule out the presence of PCR inhibitors in the specimen or target RNA in concentration below the limit of detection for the assay. If only one viral target is positive but coinfection with multiple targets is suspected, the sample should be re-tested with another FDA cleared, approved, or authorized test, if coninfection would change clinical management. This test was validated by the [facility]. These laboratories are certified under the Clinical Laboratory Improvement Amendments of 1988 (CLIA-88) as qualified to perfom high complexity laboratory testing.   CBC with platelets and differential     Status: Abnormal   Result Value Ref Range    WBC Count 15.7 (H) 4.0 - 11.0 10e3/uL    RBC Count 5.01 4.40 - 5.90 10e6/uL    Hemoglobin 13.7 13.3 - 17.7 g/dL    Hematocrit 45.8 40.0 - 53.0 %    MCV 91 78 - 100 fL    MCH 27.3 26.5 - 33.0 pg    MCHC 29.9 (L) 31.5 - 36.5 g/dL    RDW 15.4 (H) 10.0 - 15.0 %    Platelet Count 304 150 - 450 10e3/uL    % Neutrophils 76 %    % Lymphocytes 15 %    % Monocytes 8 %    % Eosinophils 1 %    % Basophils 0 %    % Immature Granulocytes 1 %    NRBCs per 100 WBC 0 <1 /100    Absolute Neutrophils 11.9 (H) 1.6 - 8.3 10e3/uL    Absolute Lymphocytes 2.3 0.8 - 5.3 10e3/uL    Absolute Monocytes 1.2 0.0 - 1.3 10e3/uL    Absolute Eosinophils 0.1 0.0 - 0.7 10e3/uL    Absolute Basophils 0.1 0.0 - 0.2 10e3/uL    Absolute Immature Granulocytes 0.2 <=0.4 10e3/uL    Absolute NRBCs 0.0 10e3/uL   EKG 12-lead, tracing only     Status: None   Result Value Ref Range    Systolic Blood Pressure  mmHg    Diastolic Blood Pressure  mmHg     Ventricular Rate 92 BPM    Atrial Rate 92 BPM    LA Interval 160 ms    QRS Duration 146 ms     ms    QTc 516 ms    P Axis  degrees    R AXIS 198 degrees    T Axis 87 degrees    Interpretation ECG       Sinus rhythm  Non-specific intra-ventricular conduction block  Possible Lateral infarct , age undetermined  Abnormal ECG  Unconfirmed report - interpretation of this ECG is computer generated - see medical record for final interpretation    Confirmed by - EMERGENCY ROOM, PHYSICIAN ([id number]),  Cris Reardon ([id number]) on 1/16/2025 7:00:58 AM     Blood Culture Peripheral Blood     Status: Normal (Preliminary result)    Specimen: Peripheral Blood   Result Value Ref Range    Culture No growth after 12 hours    Blood Culture Peripheral Blood     Status: Normal (Preliminary result)    Specimen: Peripheral Blood   Result Value Ref Range    Culture No growth after 12 hours    CBC with platelets differential     Status: Abnormal    Narrative    The following orders were created for panel order CBC with platelets differential.  Procedure                               Abnormality         Status                     ---------                               -----------         ------                     CBC with platelets and d...[[id number]]  Abnormal            Final result                 Please view results for these tests on the individual orders.   Results for orders placed or performed in visit on 01/15/25   Cardiac Device Check - Remote (Standing ORD 5 count)     Status: None   Result Value Ref Range    Date Time Interrogation Session [id number]     Implantable Pulse Generator  [employer]     Implantable Pulse Generator Model [id number] Cobalt XT VR MRI     Implantable Pulse Generator Serial Number [id number]     Type Interrogation Session Remote Device Initiated     Clinic Name [facility]     Implantable Pulse Generator Type Defibrillator     Implantable Pulse Generator Implant  Date 20240905     Implantable Lead  [employer]onic     Implantable Lead Model 6935M Nicol Luthero Secure S     Implantable Lead Serial Number [id number]     Implantable Lead Implant Date 20240905     Implantable Lead Polarity Type Tripolar Lead     Implantable Lead Location Detail 1 SEPTUM     Implantable Lead Special Function 62 cm     Implantable Lead Location Right Ventricle     Implantable Lead Connection Status Connected     Jesus Setting Mode (NBG Code) VVI     Jesus Setting Lower Rate Limit 40 [beats]/min    Jesus Setting Hysterisis Rate Off     Lead Channel Setting Sensing Polarity Bipolar     Lead Channel Setting Sensing Anode Location Right Ventricle     Lead Channel Setting Sensing Anode Terminal Ring     Lead Channel Setting Sensing Cathode Location Right Ventricle     Lead Channel Setting Sensing Cathode Terminal Tip     Lead Channel Setting Sensing Sensitivity 0.45 mV    Lead Channel Setting Pacing Polarity Bipolar     Lead Channel Setting Pacing Anode Location Right Ventricle     Lead Channel Setting Pacing Anode Terminal Ring     Lead Channel Setting Sensing Cathode Location Right Ventricle     Lead Channel Setting Sensing Cathode Terminal Tip     Lead Channel Setting Pacing Pulse Width 0.4 ms    Lead Channel Setting Pacing Amplitude 2.0 V    Lead Channel Setting Pacing Capture Mode Adaptive     Zone Setting Type Category VF     Zone Setting Vendor Type Category VF     Zone Setting Status Active     Zone Setting Detection Interval 280 ms    Zone Setting Detection Beats Numerator 30 [beats]    Zone Setting Detection Beats Denominator 40 [beats]    Zone Setting Type Category VT     Zone Setting Vendor Type Category FastVT     Zone Setting Status Inactive     Zone Setting Detection Interval 240 ms    Zone Setting Type Category VT     Zone Setting Vendor Type Category VT     Zone Setting Status Active     Zone Setting Detection Interval 330 ms    Zone Setting Detection Beats Numerator 16 [beats]     Zone Setting Detection Beats Denominator 16 [beats]    Zone Setting Type Category VT     Zone Setting Vendor Type Category MonVT     Zone Setting Status Inactive     Zone Setting Detection Interval 450 ms    Zone Setting Detection Beats Numerator 32 [beats]    Zone Setting Detection Beats Denominator 32 [beats]    Lead Channel Impedance Value 285 ohm    Lead Channel Impedance Value 399 ohm    Lead Channel Sensing Intrinsic Amplitude 13.6 mV    Lead Channel Pacing Threshold Amplitude 1.0 V    Lead Channel Pacing Threshold Pulse Width 0.4 ms    Battery Date Time of Measurements [id number]     Battery Status Beginning of Service     Battery RRT Trigger 2.8 V     Battery Remaining Longevity 168 mo    Battery Voltage 3.09 V    Capacitor Charge Type Shock     Capacitor Last Charge Date Time 20250110011900     Capacitor Charge Time 3.5 s    Capacitor Charge Energy 18.0 J    Jesus Statistic Date Time Start 20250111102217     Jesus Statistic Date Time End 20250115142219     Jesus Statistic RV Percent Paced 0.04 %    CRT Statistic Date Time Start 20250111102217     CRT Statistic Date Time End 20250115142219     Atrial Tachy Statistic Date Time Start 20250111102217     Atrial Tachy Statistic Date Time End 20250115142219     Atrial Tachy Statistic AT/AF [place] Percent 7.83 %    Therapy Statistic Recent Shocks Delivered 0     Therapy Statistic Recent Shocks Aborted 0     Therapy Statistic Recent ATP Delivered 0     Therapy Statistic Recent Date Time Start 20250111102217     Therapy Statistic Recent Date Time End 20250115142219     Therapy Statistic Total Shocks Delivered 0     Therapy Statistic Total Shocks Aborted 0     Therapy Statistic Total ATP Delivered 0     Therapy Statistic Total  Date Time Start 60752104576121     Therapy Statistic Total  Date Time End 20250115142219     Episode Statistic Recent Count 0     Episode Statistic Type Category SVT     Episode Statistic Recent Count 0     Episode Statistic Type  Category VT     Episode Statistic Recent Count 0     Episode Statistic Type Category VF     Episode Statistic Recent Count 0     Episode Statistic Type Category VT     Episode Statistic Recent Count 0     Episode Statistic Type Category VT     Episode Statistic Recent Count 0     Episode Statistic Type Category VT     Episode Statistic Recent Date Time Start [id number]     Episode Statistic Recent Date Time End 73538548384579     Episode Statistic Recent Date Time Start 77989848245407     Episode Statistic Recent Date Time End 30354387776934     Episode Statistic Recent Date Time Start 71089772395046     Episode Statistic Recent Date Time End 91399109830067     Episode Statistic Recent Date Time Start 55219595595021     Episode Statistic Recent Date Time End [id number]     Episode Statistic Recent Date Time Start 49669593872442     Episode Statistic Recent Date Time End 16642928393934     Episode Statistic Recent Date Time Start 89989123286466     Episode Statistic Recent Date Time End 61657187818270     Episode Statistic Total Count 0     Episode Statistic Type Category Patient Activated     Episode Statistic Total Count 0     Episode Statistic Type Category SVT     Episode Statistic Total Count 3     Episode Statistic Type Category VT     Episode Statistic Total Count 0     Episode Statistic Type Category VF     Episode Statistic Total Count 0     Episode Statistic Type Category VT     Episode Statistic Total Count 0     Episode Statistic Type Category VT     Episode Statistic Total Count 0     Episode Statistic Type Category VT     Episode Statistic Total Date Time Start 56239979762932     Episode Statistic Total Date Time End 94635135185700     Episode Statistic Total Date Time Start 61567412046695     Episode Statistic Total Date Time End 71383864119574     Episode Statistic Total Date Time Start 36851211592836     Episode Statistic Total Date Time End 32550750004338     Episode Statistic Total Date  "Time Start 20240905142733     Episode Statistic Total Date Time End 20250115142219     Episode Statistic Total Date Time Start 20240905142733     Episode Statistic Total Date Time End 20250115142219     Episode Statistic Total Date Time Start 20240905142733     Episode Statistic Total Date Time End 20250115142219     Episode Statistic Total Date Time Start 20240905142733     Episode Statistic Total Date Time End 20250115142219     Episode Identifier 11     Episode Type Category Monitor     Episode Date Time 20250115113333     Episode Duration 9,720 s    Episode Identifier 10     Episode Type Category Monitor     Episode Date Time 20250115100133     Episode Duration 3,960 s    Episode Identifier 9     Episode Type Category Monitor     Episode Date Time 20250115083933     Episode Duration 720 s    Episode Identifier 8     Episode Type Category Monitor     Episode Date Time 20250115002333     Episode Duration 4,200 s    Episode Identifier 7     Episode Type Category Monitor     Episode Date Time 20250114233533     Episode Duration 2,760 s    Episode Identifier 6     Episode Type Category Monitor     Episode Date Time 20250113141533     Episode Duration 360 s    Episode Identifier 5     Episode Type Category Monitor     Episode Date Time 20250113132533     Episode Duration 1,680 s    Episode Identifier 4     Episode Type Category Monitor     Episode Date Time 20250113114533     Episode Duration 4,440 s    Narrative    Remote ICD transmission received and reviewed. Device transmission sent   per MD orders. Patient was called d/t remote results and he states he   recently had a fever and has been feeling \"off\" the last couple days with   some lightheadedness. He seems to be resting at home comfortably but I did   suggest going into the ER if symptoms become worse to be evaluated, he   agreed to my suggestion.    Device: Aipai [id number] Shiloh XT VR MRI  Normal Device Function  Mode: VVI 40 bpm  : <0.1%  Presenting " EGM: Irregular VS  bpm  Thoracic Impedance: Near reference line.  Short V-V intervals: 0  Lead Trends Appear Stable.  Estimated battery longevity to RRT = 14 years. Battery voltage = 3.09 V.   Atrial Arrhythmia: 9 AF episodes recorded lasting 12 min - 2 hr 42 min.   AF [place]: 7.8%  Anticoagulant: Jantoven  Ventricular Arrhythmia: None.  Pt Notified of Transmission Results: Yes, phone call.     Plan: Device follow-up every 3 months.  YOLANDA Mas RN    Remote ICD Transmission    I have reviewed and interpreted the device interrogation, settings,   programming and nurse's summary. The device is functioning within normal   device parameters. I agree with the current findings, assessment and plan.     Medications - No data to display    Labs Ordered and Resulted from Time of ED Arrival to Time of ED Departure   PARTIAL THROMBOPLASTIN TIME - Abnormal       Result Value    aPTT 43 (*)    COMPREHENSIVE METABOLIC PANEL - Abnormal    Sodium 136      Potassium 4.1      Carbon Dioxide (CO2) 27      Anion Gap 12      Urea Nitrogen 18.6      Creatinine 1.22 (*)     GFR Estimate 69      Calcium 9.4      Chloride 97 (*)     Glucose 96      Alkaline Phosphatase 95      AST 32      ALT 31      Protein Total 7.6      Albumin 4.2      Bilirubin Total 0.5     INR - Abnormal    INR 1.73 (*)    CBC WITH PLATELETS AND DIFFERENTIAL - Abnormal    WBC Count 15.7 (*)     RBC Count 5.01      Hemoglobin 13.7      Hematocrit 45.8      MCV 91      MCH 27.3      MCHC 29.9 (*)     RDW 15.4 (*)     Platelet Count 304      % Neutrophils 76      % Lymphocytes 15      % Monocytes 8      % Eosinophils 1      % Basophils 0      % Immature Granulocytes 1      NRBCs per 100 WBC 0      Absolute Neutrophils 11.9 (*)     Absolute Lymphocytes 2.3      Absolute Monocytes 1.2      Absolute Eosinophils 0.1      Absolute Basophils 0.1      Absolute Immature Granulocytes 0.2      Absolute NRBCs 0.0     MAGNESIUM - Normal    Magnesium 2.0     TSH - Normal     TSH 3.14     LIPASE - Normal    Lipase 23     LACTIC ACID WHOLE BLOOD WITH 1X REPEAT IN 2 HR WHEN >2 - Normal    Lactic Acid, Initial 1.3     INFLUENZA A/B, RSV AND SARS-COV2 PCR - Normal    Influenza A PCR Negative      Influenza B PCR Negative      RSV PCR Negative      SARS CoV2 PCR Negative       XR Chest Port 1 View   Final Result   IMPRESSION: No significant interval change. Sternotomy. Left ventricular assist device. Single lead cardiac device left chest wall, with lead tip projected over the RV. Heart size is stable. Pulmonary vascularity is within normal limits. Lungs clear.             Critical care was not performed.     Medical Decision Making  The patient's presentation was of moderate complexity (an acute illness with systemic symptoms).    The patient's evaluation involved:  an assessment requiring an independent historian (see separate area of note for details)  review of external note(s) from 3+ sources (see separate area of note for details)  review of 3+ test result(s) ordered prior to this encounter (see separate area of note for details)  ordering and/or review of 3+ test(s) in this encounter (see separate area of note for details)  discussion of management or test interpretation with another health professional (see separate area of note for details)    The patient's management necessitated high risk (a decision regarding hospitalization).    Assessment & Plan   59-year-old male history of cardiomyopathy with LVAD for the last 2 years presents with some intermittent lightheadedness with his vice noting some intermittent A-fib.  Patient had a fever this week and that resolved and notes any infectious signs at this point white count was 15,000 no no sign of driveline infection chest x-ray otherwise clear COVID influenza RSV were negative.  Patient had blood cultures x 2 drawn discussed with cardiology will plan admit overnight for observation patient want to go home cardiology did agree.  At this  point INR is 1.73 slightly below 2-3 range did offer an extra Coumadin but patient wants to contact the LVAD coordinator regarding dosing changes to get his INR therapeutic.  This point wants to go home will return fever chills or lightheadedness syncope etc.  They live only 5 minutes away and are comfortably discharged.  LVAD coordinator will contact following day to recheck on patient.       I have reviewed the nursing notes. I have reviewed the findings, diagnosis, plan and need for follow up with the patient.    Discharge Medication List as of 1/15/2025  9:59 PM          Final diagnoses:   Intermittent atrial fibrillation (H)   History of fever   Cardiomyopathy, nonischemic (H)   ICD (implantable cardioverter-defibrillator) in place- [employer], single chamber- NOT dependent   LVAD (left ventricular assist device) present (H)         [facility] EMERGENCY DEPARTMENT  1/15/2025    This note was created at least in part by the use of dragon voice dictation system. Inadvertent typographical errors may still exist.  Neftali Mcintyre MD.  Patient evaluated in the emergency department during the COVID-19 pandemic period. Careful attention to patients safety was addressed throughout the evaluation. Evaluation and treatment management was initiated with disposition made efficiently and appropriate as possible to minimize any risk of potential exposure to patient during this evaluation.       Neftali Mcintyre MD  01/16/25 [id number]

## 2025-01-16 NOTE — PROGRESS NOTES
ANTICOAGULATION MANAGEMENT     Akshat Fragoso 59 year old male is on warfarin with subtherapeutic INR result. (Goal INR 2.0-3.0)    Recent labs: (last 7 days)     01/15/25  1844   INR 1.73*       ASSESSMENT     Source(s): Chart Review and Patient/Caregiver Call     Warfarin doses taken: Less warfarin taken than planned which may be affecting INR--on 1/3/25 Akshat thought he was supposed to go back to taking warfarin 2.5 mg four times a week and 5 mg three times a week--calendar updated with dosing he has taken.    In ED on 1/15/25 he was instructed to take a 7.5 mg dose of warfarin.  Akshat will go back to 2.5 mg SuTuTh and 5 mg ROW.  Diet: No new diet changes identified  Medication/supplement changes:  on amoxicillin for tooth abscess  New illness, injury, or hospitalization: Yes: Akshat was seen in ED 1/15/25 with intermittent atrial fib  Signs or symptoms of bleeding or clotting: No  Previous result: Therapeutic last visit; previously outside of goal range  Additional findings: Akshat switched insurance plans and is waiting for his home meter to be switched to new insurance. He will go in to lab for INR until that is straightened out.       PLAN     Recommended plan for temporary change(s) affecting INR (was taking lower dose than recommended)    Dosing Instructions: go back to taking 2.5 mg SuTuTh and 5 mg all other days of the week with next INR in 4 days.  He was instructed to take an extra 2.5 mg in the ED on 1/15/25.         Summary  As of 1/16/2025      Full warfarin instructions:  2.5 mg every Sun, Tue, Thu; 5 mg all other days   Next INR check:  1/20/2025               Telephone call with Akshat who verbalizes understanding and agrees to plan and who agrees to plan and repeated back plan correctly  Sent [employer] message with dosing and follow up instructions    Lab visit scheduled    Education provided: Contact [phone] with any changes, questions or concerns.     Plan made per [facility] anticoagulation protocol and  per LVAD protocol    Yessenia Mcfarland RN  1/16/2025  Anticoagulation Clinic  [facility] for routing messages: p ANTICOAG LVAD  [facility] patient phone line: [phone]        _______________________________________________________________________     Anticoagulation Episode Summary       Current INR goal:  2.0-3.0   TTR:  93.9% (11.8 mo)   Target end date:  Indefinite   Send INR reminders to:  ANTICOAG LVAD    Indications    Acute on chronic systolic congestive heart failure (H) [I50.23]  LVAD (left ventricular assist device) present (H) [Z95.811]  Chronic systolic congestive heart failure (H) [I50.22]  Long term use of drug [Z79.899]  Left ventricular assist device present (H) [Z95.811]  Anticoagulated on Coumadin [Z79.01]             Comments:  Follow VAD Anticoag protocol:Yes: HeartMate 3   Bridging: Enoxaparin   Date VAD placed: 3/23/23             Anticoagulation Care Providers       Provider Role Specialty Phone number    Kenzie Moreau MD Referring Advanced Heart Failure and Transplant Cardiology [phone]    Mile Bolivar, VERONICA CNP Referring Nurse Practitioner [phone]    Shaun Aguiar MD Referring Cardiovascular Disease [phone]

## 2025-01-16 NOTE — DISCHARGE INSTRUCTIONS
Home.  Your monitor shows intermittent atrial fib which may be the cause of your dizziness.  Your INR was slightly lower than target range.  Discuss with LVAD coordinator tomorrow for coumadin dosing and recheck INR and also update your symptoms.  Return if any concerns to the ER.    Results for orders placed or performed during the hospital encounter of 01/15/25   XR Chest Port 1 View     Status: None    Narrative    EXAM: XR CHEST PORT 1 VIEW  LOCATION: [facility]  DATE: 1/15/2025    INDICATION: Chest pain. Fever.  COMPARISON: 9/6/2024.      Impression    IMPRESSION: No significant interval change. Sternotomy. Left ventricular assist device. Single lead cardiac device left chest wall, with lead tip projected over the RV. Heart size is stable. Pulmonary vascularity is within normal limits. Lungs clear.   Partial thromboplastin time     Status: Abnormal   Result Value Ref Range    aPTT 43 (H) 22 - 38 Seconds   Comprehensive metabolic panel     Status: Abnormal   Result Value Ref Range    Sodium 136 135 - 145 mmol/L    Potassium 4.1 3.4 - 5.3 mmol/L    Carbon Dioxide (CO2) 27 22 - 29 mmol/L    Anion Gap 12 7 - 15 mmol/L    Urea Nitrogen 18.6 6.0 - 20.0 mg/dL    Creatinine 1.22 (H) 0.67 - 1.17 mg/dL    GFR Estimate 69 >60 mL/min/1.73m2    Calcium 9.4 8.8 - 10.4 mg/dL    Chloride 97 (L) 98 - 107 mmol/L    Glucose 96 70 - 99 mg/dL    Alkaline Phosphatase 95 40 - 150 U/L    AST 32 0 - 45 U/L    ALT 31 0 - 70 U/L    Protein Total 7.6 6.4 - 8.3 g/dL    Albumin 4.2 3.5 - 5.2 g/dL    Bilirubin Total 0.5 <=1.2 mg/dL   Magnesium     Status: Normal   Result Value Ref Range    Magnesium 2.0 1.7 - 2.3 mg/dL   TSH     Status: Normal   Result Value Ref Range    TSH 3.14 0.30 - 4.20 uIU/mL   INR     Status: Abnormal   Result Value Ref Range    INR 1.73 (H) 0.85 - 1.15   Lipase     Status: Normal   Result Value Ref Range    Lipase 23 13 - 60 U/L   Lactic Acid Whole Blood with 1X Repeat in  2 HR when >2     Status: Normal   Result Value Ref Range    Lactic Acid, Initial 1.3 0.7 - 2.0 mmol/L   Influenza A/B, RSV and SARS-CoV2 PCR (COVID-19) Nasopharyngeal     Status: Normal    Specimen: Nasopharyngeal; Swab   Result Value Ref Range    Influenza A PCR Negative Negative    Influenza B PCR Negative Negative    RSV PCR Negative Negative    SARS CoV2 PCR Negative Negative    Narrative    Testing was performed using the Xpert Xpress CoV2/Flu/RSV Assay on the [employer] GeneXpert Instrument. This test should be ordered for the detection of SARS-CoV2, influenza, and RSV viruses in individuals with signs and symptoms of respiratory tract infection. This test is for in vitro diagnostic use under the [place] FDA for laboratories certified under CLIA to perform high or moderate complexity testing. This test has been [place] FDA cleared. A negative result does not rule out the presence of PCR inhibitors in the specimen or target RNA in concentration below the limit of detection for the assay. If only one viral target is positive but coinfection with multiple targets is suspected, the sample should be re-tested with another FDA cleared, approved, or authorized test, if coninfection would change clinical management. This test was validated by the [facility]. These laboratories are certified under the Clinical Laboratory Improvement Amendments of 1988 (CLIA-88) as qualified to perfom high complexity laboratory testing.   CBC with platelets and differential     Status: Abnormal   Result Value Ref Range    WBC Count 15.7 (H) 4.0 - 11.0 10e3/uL    RBC Count 5.01 4.40 - 5.90 10e6/uL    Hemoglobin 13.7 13.3 - 17.7 g/dL    Hematocrit 45.8 40.0 - 53.0 %    MCV 91 78 - 100 fL    MCH 27.3 26.5 - 33.0 pg    MCHC 29.9 (L) 31.5 - 36.5 g/dL    RDW 15.4 (H) 10.0 - 15.0 %    Platelet Count 304 150 - 450 10e3/uL    % Neutrophils 76 %    % Lymphocytes 15 %    % Monocytes 8 %    % Eosinophils 1 %    % Basophils 0 %    % Immature  Granulocytes 1 %    NRBCs per 100 WBC 0 <1 /100    Absolute Neutrophils 11.9 (H) 1.6 - 8.3 10e3/uL    Absolute Lymphocytes 2.3 0.8 - 5.3 10e3/uL    Absolute Monocytes 1.2 0.0 - 1.3 10e3/uL    Absolute Eosinophils 0.1 0.0 - 0.7 10e3/uL    Absolute Basophils 0.1 0.0 - 0.2 10e3/uL    Absolute Immature Granulocytes 0.2 <=0.4 10e3/uL    Absolute NRBCs 0.0 10e3/uL   EKG 12-lead, tracing only     Status: None (Preliminary result)   Result Value Ref Range    Systolic Blood Pressure  mmHg    Diastolic Blood Pressure  mmHg    Ventricular Rate 92 BPM    Atrial Rate 92 BPM    IA Interval 160 ms    QRS Duration 146 ms     ms    QTc 516 ms    P Axis  degrees    R AXIS 198 degrees    T Axis 87 degrees    Interpretation ECG       Sinus rhythm  Non-specific intra-ventricular conduction block  Possible Lateral infarct , age undetermined  Abnormal ECG     CBC with platelets differential     Status: Abnormal    Narrative    The following orders were created for panel order CBC with platelets differential.  Procedure                               Abnormality         Status                     ---------                               -----------         ------                     CBC with platelets and d...[[id number]]  Abnormal            Final result                 Please view results for these tests on the individual orders.   Results for orders placed or performed in visit on 01/15/25   Cardiac Device Check - Remote (Standing ORD 5 count)     Status: None (In process)   Result Value Ref Range    Date Time Interrogation Session [id number]     Implantable Pulse Generator  [employer]     Implantable Pulse Generator Model [id number] Cobalt XT VR MRI     Implantable Pulse Generator Serial Number [id number]     Type Interrogation Session Remote Device Initiated     Clinic Name [facility]     Implantable Pulse Generator Type Defibrillator     Implantable Pulse Generator Implant Date 20240905      Implantable Lead  [employer]     Implantable Lead Model 6935M Sprint Tamiattro Secure S     Implantable Lead Serial Number [id number]     Implantable Lead Implant Date 20240905     Implantable Lead Polarity Type Tripolar Lead     Implantable Lead Location Detail 1 SEPTUM     Implantable Lead Special Function 62 cm     Implantable Lead Location Right Ventricle     Implantable Lead Connection Status Connected     Jesus Setting Mode (NBG Code) VVI     Jesus Setting Lower Rate Limit 40 [beats]/min    Jesus Setting Hysterisis Rate Off     Lead Channel Setting Sensing Polarity Bipolar     Lead Channel Setting Sensing Anode Location Right Ventricle     Lead Channel Setting Sensing Anode Terminal Ring     Lead Channel Setting Sensing Cathode Location Right Ventricle     Lead Channel Setting Sensing Cathode Terminal Tip     Lead Channel Setting Sensing Sensitivity 0.45 mV    Lead Channel Setting Pacing Polarity Bipolar     Lead Channel Setting Pacing Anode Location Right Ventricle     Lead Channel Setting Pacing Anode Terminal Ring     Lead Channel Setting Sensing Cathode Location Right Ventricle     Lead Channel Setting Sensing Cathode Terminal Tip     Lead Channel Setting Pacing Pulse Width 0.4 ms    Lead Channel Setting Pacing Amplitude 2.0 V    Lead Channel Setting Pacing Capture Mode Adaptive     Zone Setting Type Category VF     Zone Setting Vendor Type Category VF     Zone Setting Status Active     Zone Setting Detection Interval 280 ms    Zone Setting Detection Beats Numerator 30 [beats]    Zone Setting Detection Beats Denominator 40 [beats]    Zone Setting Type Category VT     Zone Setting Vendor Type Category FastVT     Zone Setting Status Inactive     Zone Setting Detection Interval 240 ms    Zone Setting Type Category VT     Zone Setting Vendor Type Category VT     Zone Setting Status Active     Zone Setting Detection Interval 330 ms    Zone Setting Detection Beats Numerator 16 [beats]    Zone Setting  Detection Beats Denominator 16 [beats]    Zone Setting Type Category VT     Zone Setting Vendor Type Category MonVT     Zone Setting Status Inactive     Zone Setting Detection Interval 450 ms    Zone Setting Detection Beats Numerator 32 [beats]    Zone Setting Detection Beats Denominator 32 [beats]    Lead Channel Impedance Value 285 ohm    Lead Channel Impedance Value 399 ohm    Lead Channel Sensing Intrinsic Amplitude 13.6 mV    Lead Channel Pacing Threshold Amplitude 1.0 V    Lead Channel Pacing Threshold Pulse Width 0.4 ms    Battery Date Time of Measurements 75055185420068     Battery Status Beginning of Service     Battery RRT Trigger 2.8 V     Battery Remaining Longevity 168 mo    Battery Voltage 3.09 V    Capacitor Charge Type Shock     Capacitor Last Charge Date Time 20250110011900     Capacitor Charge Time 3.5 s    Capacitor Charge Energy 18.0 J    Jesus Statistic Date Time Start 20250111102217     Jesus Statistic Date Time End 20250115142219     Jesus Statistic RV Percent Paced 0.04 %    CRT Statistic Date Time Start 20250111102217     CRT Statistic Date Time End 20250115142219     Atrial Tachy Statistic Date Time Start 54266754695291     Atrial Tachy Statistic Date Time End 20250115142219     Atrial Tachy Statistic AT/AF [place]et Percent 7.83 %    Therapy Statistic Recent Shocks Delivered 0     Therapy Statistic Recent Shocks Aborted 0     Therapy Statistic Recent ATP Delivered 0     Therapy Statistic Recent Date Time Start 43675499204451     Therapy Statistic Recent Date Time End 20250115142219     Therapy Statistic Total Shocks Delivered 0     Therapy Statistic Total Shocks Aborted 0     Therapy Statistic Total ATP Delivered 0     Therapy Statistic Total  Date Time Start 27554202500700     Therapy Statistic Total  Date Time End 20250115142219     Episode Statistic Recent Count 0     Episode Statistic Type Category SVT     Episode Statistic Recent Count 0     Episode Statistic Type Category VT     Episode  Statistic Recent Count 0     Episode Statistic Type Category VF     Episode Statistic Recent Count 0     Episode Statistic Type Category VT     Episode Statistic Recent Count 0     Episode Statistic Type Category VT     Episode Statistic Recent Count 0     Episode Statistic Type Category VT     Episode Statistic Recent Date Time Start 15700444665184     Episode Statistic Recent Date Time End 87152844880282     Episode Statistic Recent Date Time Start 33011799675363     Episode Statistic Recent Date Time End 05252397859740     Episode Statistic Recent Date Time Start 59115693844955     Episode Statistic Recent Date Time End 89823425652142     Episode Statistic Recent Date Time Start 16309926372615     Episode Statistic Recent Date Time End 44264114177352     Episode Statistic Recent Date Time Start 33030816847782     Episode Statistic Recent Date Time End 44944636139738     Episode Statistic Recent Date Time Start 92409604754027     Episode Statistic Recent Date Time End 86421889978412     Episode Statistic Total Count 0     Episode Statistic Type Category Patient Activated     Episode Statistic Total Count 0     Episode Statistic Type Category SVT     Episode Statistic Total Count 3     Episode Statistic Type Category VT     Episode Statistic Total Count 0     Episode Statistic Type Category VF     Episode Statistic Total Count 0     Episode Statistic Type Category VT     Episode Statistic Total Count 0     Episode Statistic Type Category VT     Episode Statistic Total Count 0     Episode Statistic Type Category VT     Episode Statistic Total Date Time Start 64025789105262     Episode Statistic Total Date Time End 52188225988080     Episode Statistic Total Date Time Start 74867706410929     Episode Statistic Total Date Time End 14048816787934     Episode Statistic Total Date Time Start 75777960062250     Episode Statistic Total Date Time End 59493441869123     Episode Statistic Total Date Time Start 70068502827942   "   Episode Statistic Total Date Time End 20250115142219     Episode Statistic Total Date Time Start 20240905142733     Episode Statistic Total Date Time End 20250115142219     Episode Statistic Total Date Time Start 20240905142733     Episode Statistic Total Date Time End 20250115142219     Episode Statistic Total Date Time Start 20240905142733     Episode Statistic Total Date Time End 20250115142219     Episode Identifier 11     Episode Type Category Monitor     Episode Date Time 20250115113333     Episode Duration 9,720 s    Episode Identifier 10     Episode Type Category Monitor     Episode Date Time 20250115100133     Episode Duration 3,960 s    Episode Identifier 9     Episode Type Category Monitor     Episode Date Time 20250115083933     Episode Duration 720 s    Episode Identifier 8     Episode Type Category Monitor     Episode Date Time 20250115002333     Episode Duration 4,200 s    Episode Identifier 7     Episode Type Category Monitor     Episode Date Time 20250114233533     Episode Duration 2,760 s    Episode Identifier 6     Episode Type Category Monitor     Episode Date Time 20250113141533     Episode Duration 360 s    Episode Identifier 5     Episode Type Category Monitor     Episode Date Time 20250113132533     Episode Duration 1,680 s    Episode Identifier 4     Episode Type Category Monitor     Episode Date Time 20250113114533     Episode Duration 4,440 s    Narrative    Remote ICD transmission received and reviewed. Device transmission sent per MD orders. Patient was called d/t remote results and he states he recently had a fever and has been feeling \"off\" the last couple days with some lightheadedness. He seems to be resting at home comfortably but I did suggest going into the ER if symptoms become worse to be evaluated, he agreed to my suggestion.    Device: [employer] [id number] Ione XT VR MRI  Normal Device Function  Mode: VVI 40 bpm  : <0.1%  Presenting EGM: Irregular VS  bpm  Thoracic " Impedance: Near reference line.  Short V-V intervals: 0  Lead Trends Appear Stable.  Estimated battery longevity to RRT = 14 years. Battery voltage = 3.09 V.   Atrial Arrhythmia: 9 AF episodes recorded lasting 12 min - 2 hr 42 min.   AF Thomson: 7.8%  Anticoagulant: Jantoven  Ventricular Arrhythmia: None.  Pt Notified of Transmission Results: Yes, phone call.     Plan: Device follow-up every 3 months.  YOLANDA Mas RN    Remote ICD Transmission    I have reviewed and interpreted the device interrogation, settings, programming and nurse's summary. The device is functioning within normal device parameters. I agree with the current findings, assessment and plan.

## 2025-01-17 ENCOUNTER — OFFICE VISIT (OUTPATIENT)
Dept: FAMILY MEDICINE | Facility: CLINIC | Age: 60
End: 2025-01-17
Payer: COMMERCIAL

## 2025-01-17 VITALS
HEART RATE: 103 BPM | BODY MASS INDEX: 28.88 KG/M2 | RESPIRATION RATE: 12 BRPM | HEIGHT: 67 IN | OXYGEN SATURATION: 96 % | WEIGHT: 184 LBS | TEMPERATURE: 98 F

## 2025-01-17 DIAGNOSIS — Z11.59 NEED FOR HEPATITIS C SCREENING TEST: ICD-10-CM

## 2025-01-17 DIAGNOSIS — Z00.00 ROUTINE GENERAL MEDICAL EXAMINATION AT A HEALTH CARE FACILITY: Primary | ICD-10-CM

## 2025-01-17 DIAGNOSIS — Z13.9 ENCOUNTER FOR SCREENING INVOLVING SOCIAL DETERMINANTS OF HEALTH (SDOH): ICD-10-CM

## 2025-01-17 DIAGNOSIS — D72.828 NEUTROPHILIA: ICD-10-CM

## 2025-01-17 DIAGNOSIS — K04.7 TOOTH ABSCESS: ICD-10-CM

## 2025-01-17 DIAGNOSIS — Z12.5 SCREENING FOR PROSTATE CANCER: ICD-10-CM

## 2025-01-17 DIAGNOSIS — Z95.811 LVAD (LEFT VENTRICULAR ASSIST DEVICE) PRESENT (H): ICD-10-CM

## 2025-01-17 LAB
INR BLD: 2.8 (ref 0.9–1.1)
PSA SERPL DL<=0.01 NG/ML-MCNC: 0.29 NG/ML (ref 0–3.5)

## 2025-01-17 PROCEDURE — 36415 COLL VENOUS BLD VENIPUNCTURE: CPT | Performed by: STUDENT IN AN ORGANIZED HEALTH CARE EDUCATION/TRAINING PROGRAM

## 2025-01-17 PROCEDURE — 99396 PREV VISIT EST AGE 40-64: CPT | Performed by: STUDENT IN AN ORGANIZED HEALTH CARE EDUCATION/TRAINING PROGRAM

## 2025-01-17 PROCEDURE — 85045 AUTOMATED RETICULOCYTE COUNT: CPT | Performed by: STUDENT IN AN ORGANIZED HEALTH CARE EDUCATION/TRAINING PROGRAM

## 2025-01-17 PROCEDURE — G0103 PSA SCREENING: HCPCS | Performed by: STUDENT IN AN ORGANIZED HEALTH CARE EDUCATION/TRAINING PROGRAM

## 2025-01-17 PROCEDURE — 85025 COMPLETE CBC W/AUTO DIFF WBC: CPT | Performed by: STUDENT IN AN ORGANIZED HEALTH CARE EDUCATION/TRAINING PROGRAM

## 2025-01-17 PROCEDURE — 85610 PROTHROMBIN TIME: CPT | Performed by: STUDENT IN AN ORGANIZED HEALTH CARE EDUCATION/TRAINING PROGRAM

## 2025-01-17 RX ORDER — AMOXICILLIN 500 MG/1
500 CAPSULE ORAL 3 TIMES DAILY
Qty: 21 CAPSULE | Refills: 0 | Status: SHIPPED | OUTPATIENT
Start: 2025-01-17 | End: 2025-01-24

## 2025-01-17 ASSESSMENT — PAIN SCALES - GENERAL: PAINLEVEL_OUTOF10: NO PAIN (0)

## 2025-01-17 NOTE — PROGRESS NOTES
Assessment & Plan     Routine general medical examination at a health care facility  Immunizations: Discussed immunizations; Patient would like to discuss with Cardiology before getting any vaccines.   Skin Cancer: No personal/family history of skin cancer. No concerning skin lesions. Recommended continuing to observe for any new/existing concerning moles that are changing according to ABCDE criteria and adequate utilization of sunscreen SPF 50 when outside with reapplication   Patient has had extensive lab testing over last year - reviewed at appt today.  Colon Cancer: UTD  - Colonoscopy 2023 - normal with no polyps.   - Next due 3/2033.  Prostate Cancer: PSA ordered.   Lung Cancer: 15 PPY, quit 2023, not eligible.   AAA: 15 PPY, quit 2023, not due.     Discussed getting at least 150 minutes of aerobic exercise weekly, healthy diet with focus on 4-5 serving of fruits/veggies, lean meat, and reducing saturated fats/sugars, and getting 7-8 hours of sleep nightly (should feel rested when waking up or not getting enough).    Screening for prostate cancer  - PROSTATE SPEC ANTIGEN SCREEN    Need for hepatitis C screening test  - Hepatitis C Screen Reflex to HCV RNA Quant and Genotype; Future    Encounter for screening involving social determinants of health (SDoH)  Referral placed to facilitate patient getting assistance with needed dental care.     - Primary Care - Care Coordination Referral; Future    Tooth abscess  Patient has current dental abscess of tooth; he is unable to access care to dentist at this time. Plan to treat with amoxicillin 500 mg TID x7 days. Discussed importance of dental care; above referral placed to help him get set up with new dentist.     - amoxicillin (AMOXIL) 500 MG capsule; Take 1 capsule (500 mg) by mouth 3 times daily for 7 days.    NICM s/p DT LVAD 3/2023 LVEF 12%   Continue care with Cardiology team.     Neutrophilia  Patient has a history of mild, stable neutrophilia, and per patient  "and his wife, there has never been a known cause identified. They are quite worried about this.     Plan: Repeat CBC with peripheral smear.     - CBC with platelets and differential; Future  - Lab Blood Morphology Pathologist Review; Future      BMI  Estimated body mass index is 28.82 kg/m  as calculated from the following:    Height as of this encounter: 1.702 m (5' 7\").    Weight as of this encounter: 83.5 kg (184 lb).   Weight management plan: Discussed healthy diet and exercise guidelines      Raza Oden is a 59 year old, presenting for the following health issues:  Establish Care, Throat Problem, and Refill Request (Pt was wondering if he can get a refill on Amoxicillin 500MG Capsules (Take one daily by mouth three times) or a new prescription on it. )        1/17/2025    11:09 AM   Additional Questions   Roomed by Lorie SANCHEZ MA   Accompanied by Self         1/17/2025    11:09 AM   Patient Reported Additional Medications   Patient reports taking the following new medications None     History of Present Illness       Reason for visit:  Follow up from ER visit to check blood test results, INR, establish primary care    He eats 2-3 servings of fruits and vegetables daily.He consumes 2 sweetened beverage(s) daily.He exercises with enough effort to increase his heart rate 9 or less minutes per day.  He exercises with enough effort to increase his heart rate 3 or less days per week.   He is taking medications regularly.                     Objective    Pulse 103   Temp 98  F (36.7  C) (Temporal)   Resp 12   Ht 1.702 m (5' 7\")   Wt 83.5 kg (184 lb)   SpO2 96%   BMI 28.82 kg/m    Body mass index is 28.82 kg/m .  Physical Exam   GENERAL: alert and no distress  EYES: Eyes grossly normal to inspection, PERRL and conjunctivae and sclerae normal  HENT: ear canals and TM's normal, nose and mouth without ulcers or lesions  NECK: no adenopathy, no asymmetry, masses, or scars. No thyromegaly or nodules " palpated.  RESP: lungs clear to auscultation - no rales, rhonchi or wheezes  CV: regular rate and rhythm, normal S1 S2, no S3 or S4, no murmur, click or rub, no peripheral edema  ABDOMEN: soft, nontender, no hepatosplenomegaly, no masses and bowel sounds normal  MS: no gross musculoskeletal defects noted, no edema  SKIN: no suspicious lesions or rashes  NEURO: Normal strength and tone, mentation intact and speech normal  PSYCH: mentation appears normal, affect normal/bright              Signed Electronically by: Alexander Jerez PA-C

## 2025-01-20 ENCOUNTER — LAB (OUTPATIENT)
Dept: LAB | Facility: CLINIC | Age: 60
End: 2025-01-20
Payer: COMMERCIAL

## 2025-01-20 ENCOUNTER — ANTICOAGULATION THERAPY VISIT (OUTPATIENT)
Dept: ANTICOAGULATION | Facility: CLINIC | Age: 60
End: 2025-01-20

## 2025-01-20 ENCOUNTER — PATIENT OUTREACH (OUTPATIENT)
Dept: CARE COORDINATION | Facility: CLINIC | Age: 60
End: 2025-01-20

## 2025-01-20 ENCOUNTER — ANCILLARY PROCEDURE (OUTPATIENT)
Dept: CARDIOLOGY | Facility: CLINIC | Age: 60
End: 2025-01-20
Attending: INTERNAL MEDICINE
Payer: COMMERCIAL

## 2025-01-20 DIAGNOSIS — Z79.01 ANTICOAGULATED ON COUMADIN: ICD-10-CM

## 2025-01-20 DIAGNOSIS — I50.23 ACUTE ON CHRONIC SYSTOLIC CONGESTIVE HEART FAILURE (H): Primary | ICD-10-CM

## 2025-01-20 DIAGNOSIS — Z11.59 NEED FOR HEPATITIS C SCREENING TEST: ICD-10-CM

## 2025-01-20 DIAGNOSIS — I42.9 CARDIOMYOPATHY (H): ICD-10-CM

## 2025-01-20 DIAGNOSIS — I50.22 CHRONIC SYSTOLIC CONGESTIVE HEART FAILURE (H): ICD-10-CM

## 2025-01-20 DIAGNOSIS — Z95.811 LVAD (LEFT VENTRICULAR ASSIST DEVICE) PRESENT (H): ICD-10-CM

## 2025-01-20 DIAGNOSIS — Z95.811 LEFT VENTRICULAR ASSIST DEVICE PRESENT (H): ICD-10-CM

## 2025-01-20 DIAGNOSIS — Z79.899 LONG TERM USE OF DRUG: ICD-10-CM

## 2025-01-20 LAB
BACTERIA BLD CULT: NO GROWTH
BACTERIA BLD CULT: NO GROWTH
HCV AB SERPL QL IA: NONREACTIVE
INR PPP: 2.34 (ref 0.85–1.15)
MDC_IDC_LEAD_CONNECTION_STATUS: NORMAL
MDC_IDC_LEAD_IMPLANT_DT: NORMAL
MDC_IDC_LEAD_LOCATION: NORMAL
MDC_IDC_LEAD_LOCATION_DETAIL_1: NORMAL
MDC_IDC_LEAD_MFG: NORMAL
MDC_IDC_LEAD_MODEL: NORMAL
MDC_IDC_LEAD_POLARITY_TYPE: NORMAL
MDC_IDC_LEAD_SERIAL: NORMAL
MDC_IDC_LEAD_SPECIAL_FUNCTION: NORMAL
MDC_IDC_MSMT_BATTERY_REMAINING_LONGEVITY: 168 MO
MDC_IDC_MSMT_LEADCHNL_RV_IMPEDANCE_VALUE: 399 OHM
MDC_IDC_MSMT_LEADCHNL_RV_PACING_THRESHOLD_AMPLITUDE: 1 V
MDC_IDC_MSMT_LEADCHNL_RV_PACING_THRESHOLD_PULSEWIDTH: 0.4 MS
MDC_IDC_MSMT_LEADCHNL_RV_SENSING_INTR_AMPL: 14 MV
MDC_IDC_PG_IMPLANT_DTM: NORMAL
MDC_IDC_PG_MFG: NORMAL
MDC_IDC_PG_MODEL: NORMAL
MDC_IDC_PG_SERIAL: NORMAL
MDC_IDC_PG_TYPE: NORMAL
MDC_IDC_SESS_CLINIC_NAME: NORMAL
MDC_IDC_SESS_DTM: NORMAL
MDC_IDC_SESS_TYPE: NORMAL
MDC_IDC_SET_BRADY_HYSTRATE: NORMAL
MDC_IDC_SET_BRADY_LOWRATE: 40 {BEATS}/MIN
MDC_IDC_SET_BRADY_MODE: NORMAL
MDC_IDC_SET_LEADCHNL_RV_PACING_AMPLITUDE: 2 V
MDC_IDC_SET_LEADCHNL_RV_PACING_ANODE_ELECTRODE_1: NORMAL
MDC_IDC_SET_LEADCHNL_RV_PACING_ANODE_LOCATION_1: NORMAL
MDC_IDC_SET_LEADCHNL_RV_PACING_CAPTURE_MODE: NORMAL
MDC_IDC_SET_LEADCHNL_RV_PACING_CATHODE_ELECTRODE_1: NORMAL
MDC_IDC_SET_LEADCHNL_RV_PACING_CATHODE_LOCATION_1: NORMAL
MDC_IDC_SET_LEADCHNL_RV_PACING_POLARITY: NORMAL
MDC_IDC_SET_LEADCHNL_RV_PACING_PULSEWIDTH: 0.4 MS
MDC_IDC_SET_LEADCHNL_RV_SENSING_ANODE_ELECTRODE_1: NORMAL
MDC_IDC_SET_LEADCHNL_RV_SENSING_ANODE_LOCATION_1: NORMAL
MDC_IDC_SET_LEADCHNL_RV_SENSING_CATHODE_ELECTRODE_1: NORMAL
MDC_IDC_SET_LEADCHNL_RV_SENSING_CATHODE_LOCATION_1: NORMAL
MDC_IDC_SET_LEADCHNL_RV_SENSING_POLARITY: NORMAL
MDC_IDC_SET_LEADCHNL_RV_SENSING_SENSITIVITY: 0.45 MV
MDC_IDC_SET_ZONE_DETECTION_BEATS_DENOMINATOR: 16 {BEATS}
MDC_IDC_SET_ZONE_DETECTION_BEATS_DENOMINATOR: 32 {BEATS}
MDC_IDC_SET_ZONE_DETECTION_BEATS_DENOMINATOR: 40 {BEATS}
MDC_IDC_SET_ZONE_DETECTION_BEATS_NUMERATOR: 16 {BEATS}
MDC_IDC_SET_ZONE_DETECTION_BEATS_NUMERATOR: 30 {BEATS}
MDC_IDC_SET_ZONE_DETECTION_BEATS_NUMERATOR: 32 {BEATS}
MDC_IDC_SET_ZONE_DETECTION_INTERVAL: 240 MS
MDC_IDC_SET_ZONE_DETECTION_INTERVAL: 280 MS
MDC_IDC_SET_ZONE_DETECTION_INTERVAL: 330 MS
MDC_IDC_SET_ZONE_DETECTION_INTERVAL: 450 MS
MDC_IDC_SET_ZONE_STATUS: NORMAL
MDC_IDC_SET_ZONE_TYPE: NORMAL
MDC_IDC_SET_ZONE_VENDOR_TYPE: NORMAL
MDC_IDC_STAT_AT_BURDEN_PERCENT: 25.7 %
MDC_IDC_STAT_BRADY_RV_PERCENT_PACED: 0.1 %
MDC_IDC_STAT_EPISODE_RECENT_COUNT: 0
MDC_IDC_STAT_EPISODE_RECENT_COUNT: 0
MDC_IDC_STAT_EPISODE_RECENT_COUNT: 6
MDC_IDC_STAT_EPISODE_TYPE: NORMAL

## 2025-01-20 PROCEDURE — 99207 CARDIAC DEVICE CHECK - REMOTE: CPT | Performed by: INTERNAL MEDICINE

## 2025-01-20 PROCEDURE — 86803 HEPATITIS C AB TEST: CPT | Performed by: STUDENT IN AN ORGANIZED HEALTH CARE EDUCATION/TRAINING PROGRAM

## 2025-01-20 PROCEDURE — 85610 PROTHROMBIN TIME: CPT | Performed by: PATHOLOGY

## 2025-01-20 PROCEDURE — 99000 SPECIMEN HANDLING OFFICE-LAB: CPT | Performed by: PATHOLOGY

## 2025-01-20 PROCEDURE — 36415 COLL VENOUS BLD VENIPUNCTURE: CPT | Performed by: PATHOLOGY

## 2025-01-20 NOTE — LETTER
[facility] CARE COORDINATION  [address]  [place] [address]    January 21, 2025    Akshat Fragoso  [address]  [facility] [address]      Dear Akshat,    I am a clinic care coordinator who works with Alexander Jerez PA-C with the [facility]. I wanted to introduce myself and provide you with my contact information for you to be able to call me with any questions or concerns. Below is a description of clinic care coordination and how I can further assist you.       The clinic care coordination team is made up of a registered nurse, , financial resource worker and community health worker who understand the health care system. The goal of clinic care coordination is to help you manage your health and improve access to the health care system. Our team works alongside your provider to assist you in determining your health and social needs. We can help you obtain health care and community resources, providing you with necessary information and education. We can work with you through any barriers and develop a care plan that helps coordinate and strengthen the communication between you and your care team.  Our services are voluntary and are offered without charge to you personally.    Please feel free to contact me with any questions or concerns regarding care coordination and what we can offer.      We are focused on providing you with the highest-quality healthcare experience possible.    Sincerely,     Ibis Conklin, CHW, B.A. [facility] Care Coordination  [facility]:   [facility]  [phone]

## 2025-01-20 NOTE — PROGRESS NOTES
ANTICOAGULATION MANAGEMENT     Akshat Fragoso 59 year old male is on warfarin with therapeutic INR result. (Goal INR 2.0-3.0)    Recent labs: (last 7 days)     01/20/25  1202   INR 2.34*       ASSESSMENT     Source(s): Chart Review and Patient/Caregiver Call     Warfarin doses taken: Reviewed in chart and partial hold on Friday  Diet: No new diet changes identified  Medication/supplement changes:  On Amoxicillin for 3 more days  New illness, injury, or hospitalization: Yes: 2 abscessed teeth   Signs or symptoms of bleeding or clotting: No  Previous result: Therapeutic last visit; previously outside of goal range  Additional findings:  Akshat switched insurance companies and is waiting for his home meter supplies to arrive. We will plan for a recheck INR in 2 weeks with his home meter-if the supplies have not come by then, will need to schedule in clinic lab again.      PLAN     Recommended plan for temporary change(s) affecting INR     Dosing Instructions: Continue your current warfarin dose with next INR in 2 weeks       Summary  As of 1/20/2025      Full warfarin instructions:  2.5 mg every Sun, Tue, Thu; 5 mg all other days   Next INR check:  2/3/2025               Telephone call with Akshat who agrees to plan and repeated back plan correctly    Patient to recheck with home meter    Education provided: Goal range and lab monitoring: goal range and significance of current result and Importance of following up at instructed interval  Symptom monitoring: monitoring for bleeding signs and symptoms  Contact [phone] with any changes, questions or concerns.     Plan made per [facility] anticoagulation protocol and per LVAD protocol    TREV LYN RN  1/20/2025  Anticoagulation Clinic  [employer] for routing messages: earnest SOMMER LVAD  [facility] patient phone line: [phone]        _______________________________________________________________________     Anticoagulation Episode Summary       Current INR goal:  2.0-3.0   TTR:   93.8% (11.8 mo)   Target end date:  Indefinite   Send INR reminders to:  ANTICOAG LVAD    Indications    Acute on chronic systolic congestive heart failure (H) [I50.23]  LVAD (left ventricular assist device) present (H) [Z95.811]  Chronic systolic congestive heart failure (H) [I50.22]  Long term use of drug [Z79.899]  Left ventricular assist device present (H) [Z95.811]  Anticoagulated on Coumadin [Z79.01]             Comments:  Follow VAD Anticoag protocol:Yes: HeartMate 3   Bridging: Enoxaparin   Date VAD placed: 3/23/23             Anticoagulation Care Providers       Provider Role Specialty Phone number    Kenzie Moreau MD Referring Advanced Heart Failure and Transplant Cardiology [phone]    Mile Bolivar, APRN CNP Referring Nurse Practitioner [phone]    Shaun Aguiar MD Referring Cardiovascular Disease [phone]

## 2025-01-20 NOTE — PROGRESS NOTES
Clinic Care Coordination Contact  [facility]/Voicemail    Clinical Data: Care Coordinator Outreach    Outreach Documentation Number of Outreach Attempt   1/20/2025   2:44 PM 1       Left message on patient's voicemail with call back information and requested return call.      Plan: Care Coordinator will try to reach patient again in 1-2 business days.    Ibis Conklin CHW, B.A. [facility] Care Coordination  [facility]:   [facility]  [phone]

## 2025-01-20 NOTE — PATIENT INSTRUCTIONS
Patient Education   Preventive Care Advice   This is general advice given by our system to help you stay healthy. However, your care team may have specific advice just for you. Please talk to your care team about your preventive care needs.  Nutrition  Eat 5 or more servings of fruits and vegetables each day.  Try wheat bread, brown rice and whole grain pasta (instead of white bread, rice, and pasta).  Get enough calcium and vitamin D. Check the label on foods and aim for 100% of the RDA (recommended daily allowance).  Lifestyle  Exercise at least 150 minutes each week  (30 minutes a day, 5 days a week).  Do muscle strengthening activities 2 days a week. These help control your weight and prevent disease.  No smoking.  Wear sunscreen to prevent skin cancer.  Have a dental exam and cleaning every 6 months.  Yearly exams  See your health care team every year to talk about:  Any changes in your health.  Any medicines your care team has prescribed.  Preventive care, family planning, and ways to prevent chronic diseases.  Shots (vaccines)   HPV shots (up to age 26), if you've never had them before.  Hepatitis B shots (up to age 59), if you've never had them before.  COVID-19 shot: Get this shot when it's due.  Flu shot: Get a flu shot every year.  Tetanus shot: Get a tetanus shot every 10 years.  Pneumococcal, hepatitis A, and RSV shots: Ask your care team if you need these based on your risk.  Shingles shot (for age 50 and up)  General health tests  Diabetes screening:  Starting at age 35, Get screened for diabetes at least every 3 years.  If you are younger than age 35, ask your care team if you should be screened for diabetes.  Cholesterol test: At age 39, start having a cholesterol test every 5 years, or more often if advised.  Bone density scan (DEXA): At age 50, ask your care team if you should have this scan for osteoporosis (brittle bones).  Hepatitis C: Get tested at least once in your life.  STIs (sexually  transmitted infections)  Before age 24: Ask your care team if you should be screened for STIs.  After age 24: Get screened for STIs if you're at risk. You are at risk for STIs (including HIV) if:  You are sexually active with more than one person.  You don't use condoms every time.  You or a partner was diagnosed with a sexually transmitted infection.  If you are at risk for HIV, ask about PrEP medicine to prevent HIV.  Get tested for HIV at least once in your life, whether you are at risk for HIV or not.  Cancer screening tests  Cervical cancer screening: If you have a cervix, begin getting regular cervical cancer screening tests starting at age 21.  Breast cancer scan (mammogram): If you've ever had breasts, begin having regular mammograms starting at age 40. This is a scan to check for breast cancer.  Colon cancer screening: It is important to start screening for colon cancer at age 45.  Have a colonoscopy test every 10 years (or more often if you're at risk) Or, ask your provider about stool tests like a FIT test every year or Cologuard test every 3 years.  To learn more about your testing options, visit:   .  For help making a decision, visit:   https://bit.ly/nf21534.  Prostate cancer screening test: If you have a prostate, ask your care team if a prostate cancer screening test (PSA) at age 55 is right for you.  Lung cancer screening: If you are a current or former smoker ages 50 to 80, ask your care team if ongoing lung cancer screenings are right for you.  For informational purposes only. Not to replace the advice of your health care provider. Copyright   2023 [employer]. All rights reserved. Clinically reviewed by the [facility] Transitions Program. [employer] [id number] - REV 01/24.

## 2025-01-21 DIAGNOSIS — I50.22 CHRONIC SYSTOLIC HEART FAILURE (H): Primary | ICD-10-CM

## 2025-01-21 LAB
PATH REPORT.COMMENTS IMP SPEC: NORMAL
PATH REPORT.COMMENTS IMP SPEC: NORMAL
PATH REPORT.FINAL DX SPEC: NORMAL
PATH REPORT.MICROSCOPIC SPEC OTHER STN: NORMAL
PATH REPORT.MICROSCOPIC SPEC OTHER STN: NORMAL
PATH REPORT.RELEVANT HX SPEC: NORMAL

## 2025-01-21 RX ORDER — LIDOCAINE 40 MG/G
CREAM TOPICAL
OUTPATIENT
Start: 2025-01-21

## 2025-01-21 NOTE — PROGRESS NOTES
Clinic Care Coordination Contact  [facility]/Voicemail    Clinical Data: Care Coordinator Outreach    Outreach Documentation Number of Outreach Attempt   1/20/2025   2:44 PM 1   1/21/2025   1:39 PM 2       Left message on patient's voicemail with call back information and requested return call.      Plan: Care Coordinator will send care coordination introduction letter with care coordinator contact information and explanation of care coordination services via [employer]. Care Coordinator will do no further outreaches at this time.    Ibis Conklin, CHW, B.A. [facility] Care Coordination  [facility]:   [facility]  [phone]

## 2025-01-22 LAB
MDC_IDC_LEAD_CONNECTION_STATUS: NORMAL
MDC_IDC_LEAD_IMPLANT_DT: NORMAL
MDC_IDC_LEAD_LOCATION: NORMAL
MDC_IDC_LEAD_LOCATION_DETAIL_1: NORMAL
MDC_IDC_LEAD_MFG: NORMAL
MDC_IDC_LEAD_MODEL: NORMAL
MDC_IDC_LEAD_POLARITY_TYPE: NORMAL
MDC_IDC_LEAD_SERIAL: NORMAL
MDC_IDC_LEAD_SPECIAL_FUNCTION: NORMAL
MDC_IDC_MSMT_BATTERY_DTM: NORMAL
MDC_IDC_MSMT_BATTERY_REMAINING_LONGEVITY: 168 MO
MDC_IDC_MSMT_BATTERY_RRT_TRIGGER: NORMAL
MDC_IDC_MSMT_BATTERY_STATUS: NORMAL
MDC_IDC_MSMT_BATTERY_VOLTAGE: 3.09 V
MDC_IDC_MSMT_CAP_CHARGE_DTM: NORMAL
MDC_IDC_MSMT_CAP_CHARGE_ENERGY: 18 J
MDC_IDC_MSMT_CAP_CHARGE_TIME: 3.5 S
MDC_IDC_MSMT_CAP_CHARGE_TYPE: NORMAL
MDC_IDC_MSMT_LEADCHNL_RV_IMPEDANCE_VALUE: 285 OHM
MDC_IDC_MSMT_LEADCHNL_RV_IMPEDANCE_VALUE: 399 OHM
MDC_IDC_MSMT_LEADCHNL_RV_PACING_THRESHOLD_AMPLITUDE: 0.75 V
MDC_IDC_MSMT_LEADCHNL_RV_PACING_THRESHOLD_PULSEWIDTH: 0.4 MS
MDC_IDC_MSMT_LEADCHNL_RV_SENSING_INTR_AMPL: 13.4 MV
MDC_IDC_PG_IMPLANT_DTM: NORMAL
MDC_IDC_PG_MFG: NORMAL
MDC_IDC_PG_MODEL: NORMAL
MDC_IDC_PG_SERIAL: NORMAL
MDC_IDC_PG_TYPE: NORMAL
MDC_IDC_SESS_CLINIC_NAME: NORMAL
MDC_IDC_SESS_DTM: NORMAL
MDC_IDC_SESS_TYPE: NORMAL
MDC_IDC_SET_BRADY_HYSTRATE: NORMAL
MDC_IDC_SET_BRADY_LOWRATE: 40 {BEATS}/MIN
MDC_IDC_SET_BRADY_MODE: NORMAL
MDC_IDC_SET_LEADCHNL_RV_PACING_AMPLITUDE: 2 V
MDC_IDC_SET_LEADCHNL_RV_PACING_ANODE_ELECTRODE_1: NORMAL
MDC_IDC_SET_LEADCHNL_RV_PACING_ANODE_LOCATION_1: NORMAL
MDC_IDC_SET_LEADCHNL_RV_PACING_CAPTURE_MODE: NORMAL
MDC_IDC_SET_LEADCHNL_RV_PACING_CATHODE_ELECTRODE_1: NORMAL
MDC_IDC_SET_LEADCHNL_RV_PACING_CATHODE_LOCATION_1: NORMAL
MDC_IDC_SET_LEADCHNL_RV_PACING_POLARITY: NORMAL
MDC_IDC_SET_LEADCHNL_RV_PACING_PULSEWIDTH: 0.4 MS
MDC_IDC_SET_LEADCHNL_RV_SENSING_ANODE_ELECTRODE_1: NORMAL
MDC_IDC_SET_LEADCHNL_RV_SENSING_ANODE_LOCATION_1: NORMAL
MDC_IDC_SET_LEADCHNL_RV_SENSING_CATHODE_ELECTRODE_1: NORMAL
MDC_IDC_SET_LEADCHNL_RV_SENSING_CATHODE_LOCATION_1: NORMAL
MDC_IDC_SET_LEADCHNL_RV_SENSING_POLARITY: NORMAL
MDC_IDC_SET_LEADCHNL_RV_SENSING_SENSITIVITY: 0.45 MV
MDC_IDC_SET_ZONE_DETECTION_BEATS_DENOMINATOR: 16 {BEATS}
MDC_IDC_SET_ZONE_DETECTION_BEATS_DENOMINATOR: 32 {BEATS}
MDC_IDC_SET_ZONE_DETECTION_BEATS_DENOMINATOR: 40 {BEATS}
MDC_IDC_SET_ZONE_DETECTION_BEATS_NUMERATOR: 16 {BEATS}
MDC_IDC_SET_ZONE_DETECTION_BEATS_NUMERATOR: 30 {BEATS}
MDC_IDC_SET_ZONE_DETECTION_BEATS_NUMERATOR: 32 {BEATS}
MDC_IDC_SET_ZONE_DETECTION_INTERVAL: 240 MS
MDC_IDC_SET_ZONE_DETECTION_INTERVAL: 280 MS
MDC_IDC_SET_ZONE_DETECTION_INTERVAL: 330 MS
MDC_IDC_SET_ZONE_DETECTION_INTERVAL: 450 MS
MDC_IDC_SET_ZONE_STATUS: NORMAL
MDC_IDC_SET_ZONE_TYPE: NORMAL
MDC_IDC_SET_ZONE_VENDOR_TYPE: NORMAL
MDC_IDC_STAT_AT_BURDEN_PERCENT: 0 %
MDC_IDC_STAT_AT_DTM_END: NORMAL
MDC_IDC_STAT_AT_DTM_START: NORMAL
MDC_IDC_STAT_BRADY_DTM_END: NORMAL
MDC_IDC_STAT_BRADY_DTM_START: NORMAL
MDC_IDC_STAT_BRADY_RV_PERCENT_PACED: 0 %
MDC_IDC_STAT_CRT_DTM_END: NORMAL
MDC_IDC_STAT_CRT_DTM_START: NORMAL
MDC_IDC_STAT_EPISODE_RECENT_COUNT: 0
MDC_IDC_STAT_EPISODE_RECENT_COUNT_DTM_END: NORMAL
MDC_IDC_STAT_EPISODE_RECENT_COUNT_DTM_START: NORMAL
MDC_IDC_STAT_EPISODE_TOTAL_COUNT: 0
MDC_IDC_STAT_EPISODE_TOTAL_COUNT: 3
MDC_IDC_STAT_EPISODE_TOTAL_COUNT_DTM_END: NORMAL
MDC_IDC_STAT_EPISODE_TOTAL_COUNT_DTM_START: NORMAL
MDC_IDC_STAT_EPISODE_TYPE: NORMAL
MDC_IDC_STAT_TACHYTHERAPY_ATP_DELIVERED_RECENT: 0
MDC_IDC_STAT_TACHYTHERAPY_ATP_DELIVERED_TOTAL: 0
MDC_IDC_STAT_TACHYTHERAPY_RECENT_DTM_END: NORMAL
MDC_IDC_STAT_TACHYTHERAPY_RECENT_DTM_START: NORMAL
MDC_IDC_STAT_TACHYTHERAPY_SHOCKS_ABORTED_RECENT: 0
MDC_IDC_STAT_TACHYTHERAPY_SHOCKS_ABORTED_TOTAL: 0
MDC_IDC_STAT_TACHYTHERAPY_SHOCKS_DELIVERED_RECENT: 0
MDC_IDC_STAT_TACHYTHERAPY_SHOCKS_DELIVERED_TOTAL: 0
MDC_IDC_STAT_TACHYTHERAPY_TOTAL_DTM_END: NORMAL
MDC_IDC_STAT_TACHYTHERAPY_TOTAL_DTM_START: NORMAL

## 2025-01-25 ENCOUNTER — ANCILLARY PROCEDURE (OUTPATIENT)
Dept: CARDIOLOGY | Facility: CLINIC | Age: 60
End: 2025-01-25
Attending: INTERNAL MEDICINE
Payer: COMMERCIAL

## 2025-01-25 DIAGNOSIS — I42.9 CARDIOMYOPATHY (H): ICD-10-CM

## 2025-01-25 PROCEDURE — 99207 CARDIAC DEVICE CHECK - REMOTE: CPT | Performed by: INTERNAL MEDICINE

## 2025-01-29 LAB
MDC_IDC_LEAD_CONNECTION_STATUS: NORMAL
MDC_IDC_LEAD_IMPLANT_DT: NORMAL
MDC_IDC_LEAD_LOCATION: NORMAL
MDC_IDC_LEAD_LOCATION_DETAIL_1: NORMAL
MDC_IDC_LEAD_MFG: NORMAL
MDC_IDC_LEAD_MODEL: NORMAL
MDC_IDC_LEAD_POLARITY_TYPE: NORMAL
MDC_IDC_LEAD_SERIAL: NORMAL
MDC_IDC_LEAD_SPECIAL_FUNCTION: NORMAL
MDC_IDC_MSMT_BATTERY_DTM: NORMAL
MDC_IDC_MSMT_BATTERY_REMAINING_LONGEVITY: 166 MO
MDC_IDC_MSMT_BATTERY_RRT_TRIGGER: NORMAL
MDC_IDC_MSMT_BATTERY_STATUS: NORMAL
MDC_IDC_MSMT_BATTERY_VOLTAGE: 3.09 V
MDC_IDC_MSMT_CAP_CHARGE_DTM: NORMAL
MDC_IDC_MSMT_CAP_CHARGE_ENERGY: 18 J
MDC_IDC_MSMT_CAP_CHARGE_TIME: 3.5 S
MDC_IDC_MSMT_CAP_CHARGE_TYPE: NORMAL
MDC_IDC_MSMT_LEADCHNL_RV_IMPEDANCE_VALUE: 285 OHM
MDC_IDC_MSMT_LEADCHNL_RV_IMPEDANCE_VALUE: 399 OHM
MDC_IDC_MSMT_LEADCHNL_RV_PACING_THRESHOLD_AMPLITUDE: 0.88 V
MDC_IDC_MSMT_LEADCHNL_RV_PACING_THRESHOLD_PULSEWIDTH: 0.4 MS
MDC_IDC_MSMT_LEADCHNL_RV_SENSING_INTR_AMPL: 13.3 MV
MDC_IDC_PG_IMPLANT_DTM: NORMAL
MDC_IDC_PG_MFG: NORMAL
MDC_IDC_PG_MODEL: NORMAL
MDC_IDC_PG_SERIAL: NORMAL
MDC_IDC_PG_TYPE: NORMAL
MDC_IDC_SESS_CLINIC_NAME: NORMAL
MDC_IDC_SESS_DTM: NORMAL
MDC_IDC_SESS_TYPE: NORMAL
MDC_IDC_SET_BRADY_HYSTRATE: NORMAL
MDC_IDC_SET_BRADY_LOWRATE: 40 {BEATS}/MIN
MDC_IDC_SET_BRADY_MODE: NORMAL
MDC_IDC_SET_LEADCHNL_RV_PACING_AMPLITUDE: 2 V
MDC_IDC_SET_LEADCHNL_RV_PACING_ANODE_ELECTRODE_1: NORMAL
MDC_IDC_SET_LEADCHNL_RV_PACING_ANODE_LOCATION_1: NORMAL
MDC_IDC_SET_LEADCHNL_RV_PACING_CAPTURE_MODE: NORMAL
MDC_IDC_SET_LEADCHNL_RV_PACING_CATHODE_ELECTRODE_1: NORMAL
MDC_IDC_SET_LEADCHNL_RV_PACING_CATHODE_LOCATION_1: NORMAL
MDC_IDC_SET_LEADCHNL_RV_PACING_POLARITY: NORMAL
MDC_IDC_SET_LEADCHNL_RV_PACING_PULSEWIDTH: 0.4 MS
MDC_IDC_SET_LEADCHNL_RV_SENSING_ANODE_ELECTRODE_1: NORMAL
MDC_IDC_SET_LEADCHNL_RV_SENSING_ANODE_LOCATION_1: NORMAL
MDC_IDC_SET_LEADCHNL_RV_SENSING_CATHODE_ELECTRODE_1: NORMAL
MDC_IDC_SET_LEADCHNL_RV_SENSING_CATHODE_LOCATION_1: NORMAL
MDC_IDC_SET_LEADCHNL_RV_SENSING_POLARITY: NORMAL
MDC_IDC_SET_LEADCHNL_RV_SENSING_SENSITIVITY: 0.45 MV
MDC_IDC_SET_ZONE_DETECTION_BEATS_DENOMINATOR: 16 {BEATS}
MDC_IDC_SET_ZONE_DETECTION_BEATS_DENOMINATOR: 32 {BEATS}
MDC_IDC_SET_ZONE_DETECTION_BEATS_DENOMINATOR: 40 {BEATS}
MDC_IDC_SET_ZONE_DETECTION_BEATS_NUMERATOR: 16 {BEATS}
MDC_IDC_SET_ZONE_DETECTION_BEATS_NUMERATOR: 30 {BEATS}
MDC_IDC_SET_ZONE_DETECTION_BEATS_NUMERATOR: 32 {BEATS}
MDC_IDC_SET_ZONE_DETECTION_INTERVAL: 240 MS
MDC_IDC_SET_ZONE_DETECTION_INTERVAL: 280 MS
MDC_IDC_SET_ZONE_DETECTION_INTERVAL: 330 MS
MDC_IDC_SET_ZONE_DETECTION_INTERVAL: 450 MS
MDC_IDC_SET_ZONE_STATUS: NORMAL
MDC_IDC_SET_ZONE_TYPE: NORMAL
MDC_IDC_SET_ZONE_VENDOR_TYPE: NORMAL
MDC_IDC_STAT_AT_BURDEN_PERCENT: 0 %
MDC_IDC_STAT_AT_DTM_END: NORMAL
MDC_IDC_STAT_AT_DTM_START: NORMAL
MDC_IDC_STAT_BRADY_DTM_END: NORMAL
MDC_IDC_STAT_BRADY_DTM_START: NORMAL
MDC_IDC_STAT_BRADY_RV_PERCENT_PACED: 0.13 %
MDC_IDC_STAT_CRT_DTM_END: NORMAL
MDC_IDC_STAT_CRT_DTM_START: NORMAL
MDC_IDC_STAT_EPISODE_RECENT_COUNT: 0
MDC_IDC_STAT_EPISODE_RECENT_COUNT_DTM_END: NORMAL
MDC_IDC_STAT_EPISODE_RECENT_COUNT_DTM_START: NORMAL
MDC_IDC_STAT_EPISODE_TOTAL_COUNT: 0
MDC_IDC_STAT_EPISODE_TOTAL_COUNT: 3
MDC_IDC_STAT_EPISODE_TOTAL_COUNT_DTM_END: NORMAL
MDC_IDC_STAT_EPISODE_TOTAL_COUNT_DTM_START: NORMAL
MDC_IDC_STAT_EPISODE_TYPE: NORMAL
MDC_IDC_STAT_TACHYTHERAPY_ATP_DELIVERED_RECENT: 0
MDC_IDC_STAT_TACHYTHERAPY_ATP_DELIVERED_TOTAL: 0
MDC_IDC_STAT_TACHYTHERAPY_RECENT_DTM_END: NORMAL
MDC_IDC_STAT_TACHYTHERAPY_RECENT_DTM_START: NORMAL
MDC_IDC_STAT_TACHYTHERAPY_SHOCKS_ABORTED_RECENT: 0
MDC_IDC_STAT_TACHYTHERAPY_SHOCKS_ABORTED_TOTAL: 0
MDC_IDC_STAT_TACHYTHERAPY_SHOCKS_DELIVERED_RECENT: 0
MDC_IDC_STAT_TACHYTHERAPY_SHOCKS_DELIVERED_TOTAL: 0
MDC_IDC_STAT_TACHYTHERAPY_TOTAL_DTM_END: NORMAL
MDC_IDC_STAT_TACHYTHERAPY_TOTAL_DTM_START: NORMAL

## 2025-02-03 ENCOUNTER — ANTICOAGULATION THERAPY VISIT (OUTPATIENT)
Dept: ANTICOAGULATION | Facility: CLINIC | Age: 60
End: 2025-02-03
Payer: COMMERCIAL

## 2025-02-03 DIAGNOSIS — Z79.01 ANTICOAGULATED ON COUMADIN: ICD-10-CM

## 2025-02-03 DIAGNOSIS — Z95.811 LEFT VENTRICULAR ASSIST DEVICE PRESENT (H): ICD-10-CM

## 2025-02-03 DIAGNOSIS — Z79.899 LONG TERM USE OF DRUG: ICD-10-CM

## 2025-02-03 DIAGNOSIS — I50.22 CHRONIC SYSTOLIC CONGESTIVE HEART FAILURE (H): ICD-10-CM

## 2025-02-03 DIAGNOSIS — I50.23 ACUTE ON CHRONIC SYSTOLIC CONGESTIVE HEART FAILURE (H): Primary | ICD-10-CM

## 2025-02-03 DIAGNOSIS — Z95.811 LVAD (LEFT VENTRICULAR ASSIST DEVICE) PRESENT (H): ICD-10-CM

## 2025-02-03 LAB — INR HOME MONITORING: 2 (ref 2–3)

## 2025-02-03 NOTE — PROGRESS NOTES
ANTICOAGULATION MANAGEMENT     Akshat Fragoso 59 year old male is on warfarin with therapeutic INR result. (Goal INR 2.0-3.0)    Recent labs: (last 7 days)     02/03/25  0000   INR 2.0       ASSESSMENT     Source(s): Chart Review and Patient/Caregiver Call     Warfarin doses taken: Warfarin taken as instructed  Diet: No new diet changes identified  Medication/supplement changes: None noted  New illness, injury, or hospitalization: No  Signs or symptoms of bleeding or clotting: No  Previous result: Therapeutic last 2(+) visits  Additional findings: None       PLAN     Recommended plan for no diet, medication or health factor changes affecting INR     Dosing Instructions: Continue your current warfarin dose with next INR in 2 weeks       Summary  As of 2/3/2025      Full warfarin instructions:  2.5 mg every Sun, Tue, Thu; 5 mg all other days   Next INR check:  2/17/2025               Telephone call with Akshat who verbalizes understanding and agrees to plan    Patient to recheck with home meter    Education provided: Goal range and lab monitoring: goal range and significance of current result    Plan made per [facility] anticoagulation protocol    Maggy Grover RN  2/3/2025  Anticoagulation Clinic  [employer] for routing messages: earnest ANTICODEMARCUS LVAD  [facility] patient phone line: [phone]        _______________________________________________________________________     Anticoagulation Episode Summary       Current INR goal:  2.0-3.0   TTR:  93.7% (11.8 mo)   Target end date:  Indefinite   Send INR reminders to:  ANTICOAG LVAD    Indications    Acute on chronic systolic congestive heart failure (H) [I50.23]  LVAD (left ventricular assist device) present (H) [Z95.811]  Chronic systolic congestive heart failure (H) [I50.22]  Long term use of drug [Z79.899]  Left ventricular assist device present (H) [Z95.811]  Anticoagulated on Coumadin [Z79.01]             Comments:  Follow VAD Anticoag protocol:Yes: HeartMate 3   Bridging:  Enoxaparin   Date VAD placed: 3/23/23             Anticoagulation Care Providers       Provider Role Specialty Phone number    Kenzie Moreau MD Referring Advanced Heart Failure and Transplant Cardiology [phone]    Mile Bolivar, VERONICA CNP Referring Nurse Practitioner [phone]    Shaun Aguiar MD Referring Cardiovascular Disease [phone]

## 2025-02-09 DIAGNOSIS — Z95.811 LVAD (LEFT VENTRICULAR ASSIST DEVICE) PRESENT (H): ICD-10-CM

## 2025-02-09 DIAGNOSIS — I50.23 ACUTE ON CHRONIC SYSTOLIC CONGESTIVE HEART FAILURE (H): ICD-10-CM

## 2025-02-09 DIAGNOSIS — I50.22 CHRONIC SYSTOLIC CONGESTIVE HEART FAILURE (H): ICD-10-CM

## 2025-02-10 RX ORDER — WARFARIN SODIUM 5 MG/1
TABLET ORAL
Qty: 90 TABLET | Refills: 1 | Status: SHIPPED | OUTPATIENT
Start: 2025-02-10

## 2025-02-10 NOTE — TELEPHONE ENCOUNTER
ANTICOAGULATION MANAGEMENT:  Medication Refill    Anticoagulation Summary  As of 2/3/2025      Warfarin maintenance plan:  2.5 mg (5 mg x 0.5) every Sun, Tue, Thu; 5 mg (5 mg x 1) all other days   Next INR check:  2/17/2025   Target end date:  Indefinite    Indications    Acute on chronic systolic congestive heart failure (H) [I50.23]  LVAD (left ventricular assist device) present (H) [Z95.811]  Chronic systolic congestive heart failure (H) [I50.22]  Long term use of drug [Z79.899]  Left ventricular assist device present (H) [Z95.811]  Anticoagulated on Coumadin [Z79.01]                 Anticoagulation Care Providers       Provider Role Specialty Phone number    Kenzie Moreau MD Referring Advanced Heart Failure and Transplant Cardiology [phone]    Mile Bolivar APRN CNP Referring Nurse Practitioner [phone]    Shaun Aguiar MD Referring Cardiovascular Disease [phone]            Refill Criteria    Visit with referring provider/group: Meets criteria: visit within referring provider group in the last 15 months on 12/18/24    [facility] referral last signed: 06/10/2024; within last year:  Yes    Lab monitoring is up to date (not exceeding 2 weeks overdue): Yes    Akshat meets all criteria for refill. Rx instructions and quantity supplied updated to match patient's current dosing plan. Warfarin 90 day supply with 1 refill granted per [facility] protocol     TREV LYN RN  Anticoagulation Clinic

## 2025-02-13 LAB — INR HOME MONITORING: 2.2 (ref 2–3)

## 2025-02-26 ENCOUNTER — ANTICOAGULATION THERAPY VISIT (OUTPATIENT)
Dept: ANTICOAGULATION | Facility: CLINIC | Age: 60
End: 2025-02-26
Payer: COMMERCIAL

## 2025-02-26 DIAGNOSIS — Z79.899 LONG TERM USE OF DRUG: ICD-10-CM

## 2025-02-26 DIAGNOSIS — I50.22 CHRONIC SYSTOLIC CONGESTIVE HEART FAILURE (H): ICD-10-CM

## 2025-02-26 DIAGNOSIS — Z79.01 ANTICOAGULATED ON COUMADIN: ICD-10-CM

## 2025-02-26 DIAGNOSIS — Z95.811 LEFT VENTRICULAR ASSIST DEVICE PRESENT (H): ICD-10-CM

## 2025-02-26 DIAGNOSIS — Z95.811 LVAD (LEFT VENTRICULAR ASSIST DEVICE) PRESENT (H): ICD-10-CM

## 2025-02-26 DIAGNOSIS — Z79.899 ENCOUNTER FOR MONITORING DIGOXIN THERAPY: ICD-10-CM

## 2025-02-26 DIAGNOSIS — Z51.81 ENCOUNTER FOR MONITORING DIGOXIN THERAPY: ICD-10-CM

## 2025-02-26 DIAGNOSIS — I50.22 CHRONIC SYSTOLIC CONGESTIVE HEART FAILURE (H): Primary | ICD-10-CM

## 2025-02-26 DIAGNOSIS — I50.23 ACUTE ON CHRONIC SYSTOLIC CONGESTIVE HEART FAILURE (H): Primary | ICD-10-CM

## 2025-02-26 DIAGNOSIS — I50.22 CHRONIC SYSTOLIC HEART FAILURE (H): ICD-10-CM

## 2025-02-26 LAB — INR HOME MONITORING: 2.3 (ref 2–3)

## 2025-02-26 NOTE — PROGRESS NOTES
ANTICOAGULATION MANAGEMENT     Akshat Fragoso 59 year old male is on warfarin with therapeutic INR result. (Goal INR 2.0-3.0)    Recent labs: (last 7 days)     02/26/25  0000   INR 2.3       ASSESSMENT     Source(s): Chart Review  Previous INR was Therapeutic last 2(+) visits  Medication, diet, health changes since last INR chart reviewed; none identified         PLAN     Recommended plan for no diet, medication or health factor changes affecting INR     Dosing Instructions: Continue your current warfarin dose with next INR in 2 weeks       Summary  As of 2/26/2025      Full warfarin instructions:  2.5 mg every Sun, Tue, Thu; 5 mg all other days   Next INR check:  3/12/2025               Detailed voice message left for Akshat with dosing instructions and follow up date.   Sent [employer] message with dosing and follow up instructions    Patient to recheck with home meter    Education provided: Contact [phone] with any changes, questions or concerns.     Plan made per [facility] anticoagulation protocol and per LVAD protocol    Addie Will RN  2/26/2025  Anticoagulation Clinic  [facility] for routing messages: earnest ANTICOAG LVAD  [facility] patient phone line: [phone]        _______________________________________________________________________     Anticoagulation Episode Summary       Current INR goal:  2.0-3.0   TTR:  93.7% (11.8 mo)   Target end date:  Indefinite   Send INR reminders to:  ANTICOAG LVAD    Indications    Acute on chronic systolic congestive heart failure (H) [I50.23]  LVAD (left ventricular assist device) present (H) [Z95.811]  Chronic systolic congestive heart failure (H) [I50.22]  Long term use of drug [Z79.899]  Left ventricular assist device present (H) [Z95.811]  Anticoagulated on Coumadin [Z79.01]             Comments:  Follow VAD Anticoag protocol:Yes: HeartMate 3   Bridging: Enoxaparin   Date VAD placed: 3/23/23  [employer] home monitor             Anticoagulation Care Providers       Provider Role  Specialty Phone number    Kenzie Moreau MD Referring Advanced Heart Failure and Transplant Cardiology [phone]    Mile Bolivar, APRN CNP Referring Nurse Practitioner [phone]    Shaun Aguiar MD Referring Cardiovascular Disease [phone]

## 2025-03-04 ENCOUNTER — HOSPITAL ENCOUNTER (OUTPATIENT)
Dept: CARDIOLOGY | Facility: CLINIC | Age: 60
Discharge: HOME | End: 2025-03-04
Attending: INTERNAL MEDICINE
Payer: COMMERCIAL

## 2025-03-04 ENCOUNTER — OFFICE VISIT (OUTPATIENT)
Dept: CARDIOLOGY | Facility: CLINIC | Age: 60
End: 2025-03-04
Attending: PHYSICIAN ASSISTANT
Payer: COMMERCIAL

## 2025-03-04 ENCOUNTER — HOSPITAL ENCOUNTER (OUTPATIENT)
Facility: CLINIC | Age: 60
Discharge: HOME | End: 2025-03-04
Attending: INTERNAL MEDICINE | Admitting: INTERNAL MEDICINE
Payer: COMMERCIAL

## 2025-03-04 ENCOUNTER — ANTICOAGULATION THERAPY VISIT (OUTPATIENT)
Dept: ANTICOAGULATION | Facility: CLINIC | Age: 60
End: 2025-03-04

## 2025-03-04 ENCOUNTER — ANCILLARY PROCEDURE (OUTPATIENT)
Dept: CARDIOLOGY | Facility: CLINIC | Age: 60
End: 2025-03-04
Attending: INTERNAL MEDICINE
Payer: COMMERCIAL

## 2025-03-04 ENCOUNTER — TELEPHONE (OUTPATIENT)
Dept: CARDIOLOGY | Facility: CLINIC | Age: 60
End: 2025-03-04

## 2025-03-04 VITALS
WEIGHT: 194.3 LBS | RESPIRATION RATE: 16 BRPM | TEMPERATURE: 97.8 F | HEART RATE: 76 BPM | OXYGEN SATURATION: 95 % | HEIGHT: 67 IN | BODY MASS INDEX: 30.49 KG/M2

## 2025-03-04 VITALS
WEIGHT: 185 LBS | BODY MASS INDEX: 28.98 KG/M2 | SYSTOLIC BLOOD PRESSURE: 78 MMHG | HEART RATE: 91 BPM | OXYGEN SATURATION: 97 %

## 2025-03-04 DIAGNOSIS — Z95.811 LEFT VENTRICULAR ASSIST DEVICE PRESENT (H): ICD-10-CM

## 2025-03-04 DIAGNOSIS — I50.22 CHRONIC SYSTOLIC CONGESTIVE HEART FAILURE (H): ICD-10-CM

## 2025-03-04 DIAGNOSIS — I50.22 CHRONIC SYSTOLIC HEART FAILURE (H): ICD-10-CM

## 2025-03-04 DIAGNOSIS — Z95.810 ICD (IMPLANTABLE CARDIOVERTER-DEFIBRILLATOR) IN PLACE: ICD-10-CM

## 2025-03-04 DIAGNOSIS — Z51.81 ENCOUNTER FOR MONITORING DIGOXIN THERAPY: ICD-10-CM

## 2025-03-04 DIAGNOSIS — I50.23 ACUTE ON CHRONIC SYSTOLIC CONGESTIVE HEART FAILURE (H): Primary | ICD-10-CM

## 2025-03-04 DIAGNOSIS — Z79.01 ANTICOAGULATED ON COUMADIN: ICD-10-CM

## 2025-03-04 DIAGNOSIS — Z95.811 LVAD (LEFT VENTRICULAR ASSIST DEVICE) PRESENT (H): ICD-10-CM

## 2025-03-04 DIAGNOSIS — Z79.899 ENCOUNTER FOR MONITORING DIGOXIN THERAPY: ICD-10-CM

## 2025-03-04 DIAGNOSIS — I48.0 PAROXYSMAL ATRIAL FIBRILLATION (H): ICD-10-CM

## 2025-03-04 DIAGNOSIS — Z79.899 LONG TERM USE OF DRUG: ICD-10-CM

## 2025-03-04 LAB
ALBUMIN SERPL BCG-MCNC: 4.6 G/DL (ref 3.5–5.2)
ALP SERPL-CCNC: 87 U/L (ref 40–150)
ALT SERPL W P-5'-P-CCNC: 24 U/L (ref 0–70)
ANION GAP SERPL CALCULATED.3IONS-SCNC: 11 MMOL/L (ref 7–15)
AST SERPL W P-5'-P-CCNC: 32 U/L (ref 0–45)
BILIRUB SERPL-MCNC: 0.5 MG/DL
BUN SERPL-MCNC: 22.1 MG/DL (ref 6–20)
CALCIUM SERPL-MCNC: 9.7 MG/DL (ref 8.8–10.4)
CHLORIDE SERPL-SCNC: 98 MMOL/L (ref 98–107)
CREAT SERPL-MCNC: 1.32 MG/DL (ref 0.67–1.17)
DIGOXIN SERPL-MCNC: 2.4 NG/ML (ref 0.6–1.2)
EGFRCR SERPLBLD CKD-EPI 2021: 63 ML/MIN/1.73M2
ERYTHROCYTE [DISTWIDTH] IN BLOOD BY AUTOMATED COUNT: 16.5 % (ref 10–15)
FERRITIN SERPL-MCNC: 37 NG/ML (ref 31–409)
GLUCOSE SERPL-MCNC: 110 MG/DL (ref 70–99)
HCO3 SERPL-SCNC: 28 MMOL/L (ref 22–29)
HCT VFR BLD AUTO: 48.8 % (ref 40–53)
HGB BLD-MCNC: 14.6 G/DL (ref 13.3–17.7)
INR PPP: 2.02 (ref 0.85–1.15)
IRON BINDING CAPACITY (ROCHE): 418 UG/DL (ref 240–430)
IRON SATN MFR SERPL: 10 % (ref 15–46)
IRON SERPL-MCNC: 40 UG/DL (ref 61–157)
LDH SERPL L TO P-CCNC: 293 U/L (ref 0–250)
LVEF ECHO: NORMAL
MCH RBC QN AUTO: 26.7 PG (ref 26.5–33)
MCHC RBC AUTO-ENTMCNC: 29.9 G/DL (ref 31.5–36.5)
MCV RBC AUTO: 89 FL (ref 78–100)
NT-PROBNP SERPL-MCNC: 2789 PG/ML (ref 0–900)
PLATELET # BLD AUTO: 233 10E3/UL (ref 150–450)
POTASSIUM SERPL-SCNC: 4.6 MMOL/L (ref 3.4–5.3)
PROT SERPL-MCNC: 7.4 G/DL (ref 6.4–8.3)
RBC # BLD AUTO: 5.46 10E6/UL (ref 4.4–5.9)
SODIUM SERPL-SCNC: 137 MMOL/L (ref 135–145)
TRANSFERRIN SERPL-MCNC: 343 MG/DL (ref 200–360)
WBC # BLD AUTO: 13.1 10E3/UL (ref 4–11)

## 2025-03-04 PROCEDURE — 85041 AUTOMATED RBC COUNT: CPT | Performed by: INTERNAL MEDICINE

## 2025-03-04 PROCEDURE — 99215 OFFICE O/P EST HI 40 MIN: CPT | Mod: 25 | Performed by: INTERNAL MEDICINE

## 2025-03-04 PROCEDURE — 80053 COMPREHEN METABOLIC PANEL: CPT | Performed by: INTERNAL MEDICINE

## 2025-03-04 PROCEDURE — 36415 COLL VENOUS BLD VENIPUNCTURE: CPT | Performed by: INTERNAL MEDICINE

## 2025-03-04 PROCEDURE — 93306 TTE W/DOPPLER COMPLETE: CPT

## 2025-03-04 PROCEDURE — 272N000001 HC OR GENERAL SUPPLY STERILE: Performed by: INTERNAL MEDICINE

## 2025-03-04 PROCEDURE — 83615 LACTATE (LD) (LDH) ENZYME: CPT | Performed by: INTERNAL MEDICINE

## 2025-03-04 PROCEDURE — 93306 TTE W/DOPPLER COMPLETE: CPT | Mod: 26 | Performed by: INTERNAL MEDICINE

## 2025-03-04 PROCEDURE — 250N000009 HC RX 250: Performed by: INTERNAL MEDICINE

## 2025-03-04 PROCEDURE — 82728 ASSAY OF FERRITIN: CPT | Performed by: INTERNAL MEDICINE

## 2025-03-04 PROCEDURE — C1751 CATH, INF, PER/CENT/MIDLINE: HCPCS | Performed by: INTERNAL MEDICINE

## 2025-03-04 PROCEDURE — 83540 ASSAY OF IRON: CPT | Performed by: INTERNAL MEDICINE

## 2025-03-04 PROCEDURE — 83550 IRON BINDING TEST: CPT | Performed by: INTERNAL MEDICINE

## 2025-03-04 PROCEDURE — 93451 RIGHT HEART CATH: CPT | Mod: 26 | Performed by: INTERNAL MEDICINE

## 2025-03-04 PROCEDURE — 93282 PRGRMG EVAL IMPLANTABLE DFB: CPT | Performed by: INTERNAL MEDICINE

## 2025-03-04 PROCEDURE — 80162 ASSAY OF DIGOXIN TOTAL: CPT | Performed by: INTERNAL MEDICINE

## 2025-03-04 PROCEDURE — 1126F AMNT PAIN NOTED NONE PRSNT: CPT | Performed by: INTERNAL MEDICINE

## 2025-03-04 PROCEDURE — 93750 INTERROGATION VAD IN PERSON: CPT | Performed by: INTERNAL MEDICINE

## 2025-03-04 PROCEDURE — 83880 ASSAY OF NATRIURETIC PEPTIDE: CPT | Performed by: INTERNAL MEDICINE

## 2025-03-04 PROCEDURE — 93451 RIGHT HEART CATH: CPT | Performed by: INTERNAL MEDICINE

## 2025-03-04 PROCEDURE — 85014 HEMATOCRIT: CPT | Performed by: INTERNAL MEDICINE

## 2025-03-04 PROCEDURE — 99213 OFFICE O/P EST LOW 20 MIN: CPT | Performed by: INTERNAL MEDICINE

## 2025-03-04 PROCEDURE — 84466 ASSAY OF TRANSFERRIN: CPT | Performed by: INTERNAL MEDICINE

## 2025-03-04 PROCEDURE — 85610 PROTHROMBIN TIME: CPT | Performed by: INTERNAL MEDICINE

## 2025-03-04 PROCEDURE — 84238 ASSAY NONENDOCRINE RECEPTOR: CPT | Performed by: INTERNAL MEDICINE

## 2025-03-04 RX ORDER — LIDOCAINE 40 MG/G
CREAM TOPICAL
OUTPATIENT
Start: 2025-03-04

## 2025-03-04 RX ORDER — DIGOXIN 125 MCG
125 TABLET ORAL DAILY
Qty: 90 TABLET | Refills: 3 | Status: SHIPPED | OUTPATIENT
Start: 2025-03-04

## 2025-03-04 RX ORDER — LIDOCAINE 40 MG/G
CREAM TOPICAL
Status: COMPLETED | OUTPATIENT
Start: 2025-03-04 | End: 2025-03-04

## 2025-03-04 RX ADMIN — LIDOCAINE: 40 CREAM TOPICAL at 10:29

## 2025-03-04 ASSESSMENT — ACTIVITIES OF DAILY LIVING (ADL)
ADLS_ACUITY_SCORE: 63

## 2025-03-04 ASSESSMENT — PAIN SCALES - GENERAL: PAINLEVEL_OUTOF10: NO PAIN (0)

## 2025-03-04 NOTE — PATIENT INSTRUCTIONS
" Ventricular Assist Device Clinic  Take your medications every day, as directed!  Medication changes made today:  DECREASE digoxin to 125 mcg daily.  *With your current pills, take 1/2 tab daily, when refilling, take 1 pill daily* Instructions:  Please schedule a repeat RHC in 3-4 months. Ideally with Dr. Aguiar  Stop using THC and oxycodone. Let your VAD coordinator know when you have stopped and will start drug testing again. Monitor your heart failure, Page the VAD Coordinator on call:  If you gain more than 3 lb in a day or 5 in a week  If you feel worsening shortness of breath, palpitations, or swelling.   If you have VAD alarms or change in parameters  If you feel dizzy or lightheaded     Keep your VAD appointments!    Your next appointment is 6/11 with Crissy Chen      Please see the clinic schedulers after your appointment to schedule follow-up.    If you do not have an appointment scheduled, you need to call the VAD  at [phone]. To Page the VAD Coordinator on call, dial [phone] option #4 and ask to speak to the VAD coordinator on call. Try to maintain a heart healthy lifestyle!  Limit salt & sodium to 2000mg/day   Eat a heart healthy diet, low in saturated fats  Stay active! Aim to move at least 150 minutes every week.    Use [employer] allows you to communicate directly with your heart team through secure messaging.  [employer] can be accessed any time on your phone, computer, or tablet.  If you need assistance, we'd be happy to help!      Equipment Reminders:   Charge your back-up controller at least every 6 months. To charge, connect it to either batteries or wall power. The screen will read \"Charging\". Keep connected until the screen reads \"charging complete\". Disconnect power once the controller battery is charged. Also do a self-test when the controller is connected to power.  Replace the AA batteries in your mobile power unit every 6 months.  Check your battery charger for " when it is due for maintenance. It requires inspection in clinic once per year. There will be a sticker stating the month and year maintenance is due. When you bring your battery charger to clinic, tell one of the schedulers you have LVAD equipment that needs maintenance. They will call [facility]. You can leave your  under the LVAD sign by the  at the far end of clinic. You must drop it off before 2pm.

## 2025-03-04 NOTE — PROGRESS NOTES
March 4, 2025     Akshat Fragoso is a 59 year old male with history of HFrEF 2/2 NICM (diagnosed in 1/2017) s/p ICD placement in 6/2017, VT/VF arrest on 3/15/2023 requiring CPR for 6 mins with cardiogenic shock s/p HM3 LVAD implantation as DT 3/23/2023 c/b postop AF (on amiodarone and digoxin) and HAP, moderate TR s/p TV annuloplasty with 32 mm MC3 partial ring 3/23/2023, PFO closure 3/23/2023, chronic tobacco/marijuana use, COPD, HTN, CKD stage III, who presents today for LVAD follow-up.      Last seen in LVAD clinic 07/2024. Fatigue was better and he continued to have some dyspnea on exertion. LVAD functioning well without concerns. Medication adjustments included: recommended transition from lisinopril to entresto 12/13mg BID; however, patient has not started the Entresto yet but plans to this week.  He has been doing very well from the LVAD standpoint no concerns or issues.  No alarms no bleeding no chest pain no dizziness lightheadedness and no drainage from the driveline site.  He takes no medications.  He did use some marijuana recently and he knows this.  He also did take some extra oxycodone that he had leftover from the ICD implantation due to severe back pain.  Now he said that he is able to take anymore.  No other complaints overall doing well      PAST MEDICAL HISTORY:  Past Medical History:   Diagnosis Date    Acute right lumbar radiculopathy 11/02/2015    Acute systolic heart failure (H) 01/10/2017    Cardiomyopathy (H) 01/10/2017    CKD (chronic kidney disease) stage 3, GFR 30-59 ml/min (H) 01/30/2020    JOHNSON (dyspnea on exertion)     ED (erectile dysfunction) 10/02/2019    Erectile dysfunction     ICD (implantable cardioverter-defibrillator) in place- [employer], single chamber- NOT dependent 10/09/2017    Personal history of smoking 01/04/2017    Pulmonary nodules 01/17/2017    CT 11/2018:  Bilateral pulmonary nodules measuring up to 4 mm. No new or enlarging pulmonary nodules.     FAMILY  HISTORY:  Family History   Problem Relation Age of Onset    Parkinsonism Mother     Atrial fibrillation Father     Heart Failure Father     Prostate Cancer Father     Skin Cancer Father     Anorexia nervosa Daughter     Other - See Comments Granddaughter         premature birth     SOCIAL HISTORY:  Social History     Socioeconomic History    Marital status:      Spouse name: Cheryl    Number of children: 2   Occupational History    Occupation: Construction      Employer: [employer]   Tobacco Use    Smoking status: Former     Current packs/day: 0.00     Average packs/day: 1 pack/day for 15.0 years (15.0 ttl pk-yrs)     Types: Cigarettes     Quit date: 2023     Years since quittin.1    Smokeless tobacco: Former     Quit date: 3/15/2023   Vaping Use    Vaping status: Never Used   Substance and Sexual Activity    Alcohol use: No     Alcohol/week: 0.0 standard drinks of alcohol    Drug use: No    Sexual activity: Yes     Partners: Female     Birth control/protection: Condom   Other Topics Concern    Parent/sibling w/ CABG, MI or angioplasty before 65F 55M? Yes     Comment: both parents had stints placed      Social Drivers of Health     Financial Resource Strain: Low Risk  (9/3/2024)    Financial Resource Strain     Within the past 12 months, have you or your family members you live with been unable to get utilities (heat, electricity) when it was really needed?: No   Food Insecurity: Low Risk  (9/3/2024)    Food Insecurity     Within the past 12 months, did you worry that your food would run out before you got money to buy more?: No     Within the past 12 months, did the food you bought just not last and you didn t have money to get more?: No   Transportation Needs: Low Risk  (9/3/2024)    Transportation Needs     Within the past 12 months, has lack of transportation kept you from medical appointments, getting your medicines, non-medical meetings or appointments, work, or from getting things that you need?:  No   Physical Activity: Unknown (2/22/2021)    Received from [facility]    Exercise Vital Sign     Days of Exercise per Week: 0 days   Stress: No Stress Concern Present (2/22/2021)    Received from [facility]    [employer] [place] [employer] - Occupational Stress Questionnaire     Feeling of Stress : Only a little   Social Connections: Moderately Isolated (2/22/2021)    Received from [facility]    Social Connection and Isolation Panel [NHANES]     Frequency of Communication with Friends and Family: Twice a week     Frequency of Social Gatherings with Friends and Family: Once a week     Attends Bahai Services: Never     Active Member of Clubs or Organizations: No     Attends Club or Organization Meetings: Never     Marital Status:    Interpersonal Safety: Low Risk  (9/3/2024)    Interpersonal Safety     Do you feel physically and emotionally safe where you currently live?: Yes     Within the past 12 months, have you been hit, slapped, kicked or otherwise physically hurt by someone?: No     Within the past 12 months, have you been humiliated or emotionally abused in other ways by your partner or ex-partner?: No   Housing Stability: Low Risk  (9/3/2024)    Housing Stability     Do you have housing? : Yes     Are you worried about losing your housing?: No     CURRENT MEDICATIONS:  Current Outpatient Medications   Medication Sig Dispense Refill    acetaminophen (TYLENOL) 325 MG tablet Take 2 tablets (650 mg) by mouth every 4 hours as needed for other (For optimal non-opioid multimodal pain management to improve pain control.) 100 tablet 0    amiodarone (PACERONE) 200 MG tablet Take 0.5 tablets (100 mg) by mouth daily. 45 tablet 3    amoxicillin (AMOXIL) 500 MG tablet Take 4 tablets (2,000 mg) by mouth as needed (Take one hour before dental procedure.) 4 tablet 2    bumetanide (BUMEX) 1 MG tablet Take 1 tablet (1 mg) by mouth daily as needed (for weight  gain). 45 tablet 3    digoxin (LANOXIN) 250 MCG tablet Take 1 tablet (250 mcg) by mouth daily. 90 tablet 3    empagliflozin (JARDIANCE) 10 MG TABS tablet Take 1 tablet (10 mg) by mouth daily. 90 tablet 3    eplerenone (INSPRA) 25 MG tablet Take 12.5mg (1/2 tab) daily 45 tablet 3    JANTOVEN ANTICOAGULANT 5 MG tablet Take 0.5 to 1 tablet by mouth every day OR as directed by Anticoagulation Clinic 90 tablet 1    lisinopril (ZESTRIL) 5 MG tablet Take 1 tablet (5 mg) by mouth daily. 90 tablet 3    pantoprazole (PROTONIX) 20 MG EC tablet Take 1 tablet (20 mg) by mouth every morning (before breakfast). 90 tablet 3    sildenafil (VIAGRA) 50 MG tablet Take 1 tablet (50 mg) by mouth daily as needed (PRN for sexual activity) 30 tablet 0     No current facility-administered medications for this visit.     EXAM:  BP (!) 78/0 (BP Location: Right arm, Patient Position: Chair, Cuff Size: Adult Regular)   Pulse 91   Wt 83.9 kg (185 lb)   SpO2 97%   BMI 28.98 kg/m    GENERAL: Appears comfortable, in no acute distress.   HEENT: Eye symmetrical  CV: + LVAD hum. no adventitious sounds. JVP <6.   RESPIRATORY: Respirations regular, even, and unlabored. Lungs CTA throughout.   GI: Soft  and non distended.   EXTREMITIES: extremities warm and well perfused; no lower extremity edema. All extremities are warm and well perfused  NEUROLOGIC: Alert and interacting appropriately. No focal deficits.   SKIN: No jaundice.  Driveline site c/d/i.     Labs:  Lab Results   Component Value Date    WBC 11.9 (H) 01/17/2025    HGB 13.5 01/17/2025    HCT 45.6 01/17/2025     01/17/2025     01/15/2025    POTASSIUM 4.1 01/15/2025    CHLORIDE 97 (L) 01/15/2025    CO2 27 01/15/2025    BUN 18.6 01/15/2025    CR 1.22 (H) 01/15/2025    GLC 96 01/15/2025    DD 3.60 (H) 04/18/2023    NTBNPI 2,071 (H) 09/03/2024    NTBNP 2,687 (H) 12/11/2024    TROPI 0.033 01/09/2017    AST 32 01/15/2025    ALT 31 01/15/2025    ALKPHOS 95 01/15/2025    BILITOTAL 0.5  01/15/2025    INR 2.3 02/26/2025     Diagnostics:  7/22/24 ICD check  Device: [employer] D150 DYNAGEN  Normal Device Function  Mode: VVI 40 bpm  : 0%  Presenting EGM: VS 58 bpm  Lead Trends Appear Stable.  Estimated battery longevity to RRT = 10 years.  Atrial Arrhythmia: N/A  Anticoagulant: Warfarin  Ventricular Arrhythmia: None  Pt Notified of Transmission Results: MyChart     4/10/24 ICD check  Device: [employer] D150 DYNAGEN  Normal device function  Mode: VVI 40 bpm  : <1%  Intrinsic rhythm: VS @ 83 bpm  Lead Trends Appear Stable: Yes  Estimated battery longevity to RRT = 10.5 years  Ventricular Arrhythmia: 0  Setting Changes: RV amplitude decreased to 2.4 V.  Patient has an appointment to see Dr. Montgomery today.   Plan: Device follow-up every 3 months.  DEL Gao RN     4/1/24 [facility]  RA: --/--/15 mm Hg  RV: 60/15 mm Hg  PA: 60/30 (43) mm Hg  PCWP: --/--/24 mm Hg  CO/CI: 3.6/1.8 (TD); 3.3/1.7 (Carlos)   PVR: 5.2 (TD) LING   Right sided filling pressures are moderately elevated. Left sided filling pressures are moderately elevated. Moderately elevated pulmonary artery hypertension. Reduced cardiac output level. Hemodynamic data has been modified in [facility] per physician review.       December 27, 2023 echo  Borderline-dilated LV with severely reduced global LV function, LVEF<20%.  LVIDd=5/8 cm.  RV function appears moderately reduced on limited views.  The aortic valve appears to remains closed on limited views. There is no AI.  LVAD inflow cannula is visualized in the LV apex. LVAD outflow graft is  visualized in the aorta. Normal Doppler interrogation of the LVAD inflow  cannula and outflow graft.  This study was compared with the study from 5/3/23: No significant changes  noted.  ___________     8/7/23 [facility]  RA 8  RV 39/8  PA 39/15/24  Wedge 7  Cardiac output 4.3, cardiac index 2.3 by thermo  Cardiac output 3,   cardiac index 1.59 by Carlos  PVR: 2 Right sided filling pressures are normal. Left sided  filling pressures are normal. Mild elevated pulmonary hypertension. Normal cardiac output level.    Pressures Phase: Baseline       Time Systolic (mmHg) Diastolic (mmHg) Mean (mmHg) A Wave (mmHg) V Wave (mmHg) EDP (mmHg) Max dp/dt (mmHg/sec) HR (bpm) Content (mL/dL) SAT (%)   RA Pressures 12:09 PM     8    9    13        75          RV Pressures 12:15 PM 39            9      74          PA Pressures 12:16 PM 39    15    24            74          PCW Pressures 12:15 PM     7            75          Art Pressures 11:59 AM 99    75    83            77             Blood Flow Results Phase: Baseline    Time Results Indexed Values (L/min/m2)   QP 11:55 AM 3.07 L/min    1.59      QS 11:55 AM 3.07 L/min    1.59         Blood Oximetry Phase: Baseline    Time Hb SAT(%) PO2 Content (mL/dL) PA Sat (%)   PA 11:55 AM   59 %        59      Art 11:55 AM   100 %      19.18           TTE 3/28/2023  Technically difficult study.Extremely poor acoustic windows.  Limited information was obtained during study.  LVAD cannula was seen in the expected anatomic position in the LV apex.  HM3 at 5200RPM.  Septum normal.  LVIDd 56mm.  Aortic valve not well visualized. No AI.  Normal outflow velocity.  Global right ventricular function is moderately to severely reduced.  Small pericardial effusion with organizing material in pericardial cavity.     TTE 5/3/23:  Please refer to the EPIC report for measurements performed at different LVAD speed settings.  HM3 at 5200RPM at baseline.  LVIDd 43mm.  Septum normal.  Aortic valve remain closed at baseline.     TTE 3/4/25:  HM3 LVAD at 5100 RPM.  Left ventricular function is decreased. The ejection fraction is 10-20% (severely reduced). Moderate left ventricular dilation is present. LVIDd:6.4 cm  Global right ventricular function is moderately reduced. Normal Doppler interrogation of the LVADoutflow graft. s/p TV annuloplasty with 32 mm MC3 partial ring  The inferior vena cava was normal in size with  preserved respiratory variability.  No significant valvular abnormalities present.   This study was compared with the study from 9/5/2024. No significant changes noted.    [facility] 3/4/25:  Pressures Phase: Baseline   Time Systolic (mmHg) Diastolic (mmHg) Mean (mmHg) A Wave (mmHg) V Wave (mmHg) EDP (mmHg) Max dp/dt (mmHg/sec) HR (bpm) Content (mL/dL) SAT (%)   RA Pressures 12:07 PM   11    15    15      76        RV Pressures 11:53 AM       816          12:08 PM 42        11     71        PA Pressures 12:10 PM 42    23    31        67        PCW Pressures 12:09 PM   14    16    18      77        AO Pressures 11:56     64    78        91        Blood Flow Results Phase: Baseline   Time Results Indexed Values (L/min/m2)   QP 11:53 AM 3.91 L/min    1.96      QS 11:53 AM 3.91 L/min    1.96          Assessment and Plan:   Akshat Fragoso is a 59 year old male with history of HFrEF 2/2 NICM (diagnosed in 1/2017) s/p ICD placement in 6/2017, VT/VF arrest on 3/15/2023 requiring CPR for 6 mins with cardiogenic shock s/p HM3 LVAD implantation as DT 3/23/2023 c/b postop AF (on amiodarone and digoxin) and HAP, moderate TR s/p TV annuloplasty with 32 mm MC3 partial ring 3/23/2023, PFO closure 3/23/2023, chronic tobacco/marijuana use, COPD, HTN, CKD stage III, who presents today for follow-up.     He is doing well no new issues or concerns.  LVAD interrogated at bedside.  Speed remains at 5100 RPM.  My only concern is that overall the cardiac index is low despite the LVAD support.  The left ventricle is also dilated.  However he does not have enough wedge pressure to increase the speed and especially he is telling us that he felt horrible with the increased speed to 5200 before with severe vision changes and palpitations.  Might have been suction events.  He also does not have clear evidence for RV failure in the setting of essentially normal artery pressure.  Nevertheless we will try to hydrate him little bit more no changes.   Today and we will repeat a right heart catheterization in about 3 to 4 months to recheck the pressures.  If there is any concern for worsening cardiac output or increasing wedge pressure time we will do a CT of the chest to look for any outflow graft stenosis.     # Chronic systolic heart failure secondary to NICM with cardiogenic shock s/p HM III on 3/23/23 LVAD. ACC/AHA Stage D, NYHA Class III  Moderate TR s/p TV annuloplasty with 32 mm MC3 partial ring 3/23/2023.     Fluid status: euvolemic - will continue Bumex.   ACEi/ARB start entresto 12/13mg BID with washout from lisinopril.  Instructed patient to monitor his blood pressure with initiation of the Entresto.  BB deferred given recent LVAD implant  Aldosterone antagonist eplerenone 12.5 mg daily  SGLT-2: continue empagliflozin 10mg daily  SCD prophylaxis ICD, which was interrogated today.  NSAID use: contraindicated  Sleep Apnea Evaluation: not discussed today  BP: MAP goal 65-85, at goal  LDH trends:  Anticoagulation: warfarin INR goal 2-3  Antiplatelet: Off ASA  Advanced therapies consideration: Patient is interested in moving forward with transplant listing, we will contact the coordinators to facilitate necessary workup for committee discussion.     # Afib with RVR, resolved.   - Continue Amiodarone and Digoxin.  - Warfarin with INR goal 2-3     # Moderate TR s/p TV annuloplasty with 32 mm MC3 partial ring 3/23/2023  PFO closure 3/23/2023  - Trend on ECHOs        # HTN.   - MAP within goal, continue the above regimen    I appreciate the opportunity to participate in the care of Akshat Fragoso . Please do not hesitate to contact me with any further questions.  I have spent a total of 40 minutes today reviewing labs, imaging studies, discussing with colleagues, face-to-face time with patient and documentation in the medical record.  The longitudinal plan of care for the diagnosis(es)/condition(s) as documented were addressed during this visit. Due to the added  complexity in care, I will continue to support Akshat in the subsequent management and with ongoing continuity of care.    Sincerely,   Shaun Aguiar MD  [facility] Division of Cardiology

## 2025-03-04 NOTE — LETTER
3/4/2025      RE: Akshat Fragoso  [address]  [facility] [address]       Dear Colleague,    Thank you for the opportunity to participate in the care of your patient, Akshat Fragoso, at the [facility] [place] [facility]. Please see a copy of my visit note below.    March 4, 2025     Akshat Fragoso is a 59 year old male with history of HFrEF 2/2 NICM (diagnosed in 1/2017) s/p ICD placement in 6/2017, VT/VF arrest on 3/15/2023 requiring CPR for 6 mins with cardiogenic shock s/p HM3 LVAD implantation as DT 3/23/2023 c/b postop AF (on amiodarone and digoxin) and HAP, moderate TR s/p TV annuloplasty with 32 mm MC3 partial ring 3/23/2023, PFO closure 3/23/2023, chronic tobacco/marijuana use, COPD, HTN, CKD stage III, who presents today for LVAD follow-up.      Last seen in LVAD clinic 07/2024. Fatigue was better and he continued to have some dyspnea on exertion. LVAD functioning well without concerns. Medication adjustments included: recommended transition from lisinopril to entresto 12/13mg BID; however, patient has not started the Entresto yet but plans to this week.  He has been doing very well from the LVAD standpoint no concerns or issues.  No alarms no bleeding no chest pain no dizziness lightheadedness and no drainage from the driveline site.  He takes no medications.  He did use some marijuana recently and he knows this.  He also did take some extra oxycodone that he had leftover from the ICD implantation due to severe back pain.  Now he said that he is able to take anymore.  No other complaints overall doing well      PAST MEDICAL HISTORY:  Past Medical History:   Diagnosis Date     Acute right lumbar radiculopathy 11/02/2015     Acute systolic heart failure (H) 01/10/2017     Cardiomyopathy (H) 01/10/2017     CKD (chronic kidney disease) stage 3, GFR 30-59 ml/min (H) 01/30/2020     JOHNSON (dyspnea on exertion)      ED (erectile dysfunction)  10/02/2019     Erectile dysfunction      ICD (implantable cardioverter-defibrillator) in place- [employer], single chamber- NOT dependent 10/09/2017     Personal history of smoking 2017     Pulmonary nodules 2017    CT 2018:  Bilateral pulmonary nodules measuring up to 4 mm. No new or enlarging pulmonary nodules.     FAMILY HISTORY:  Family History   Problem Relation Age of Onset     Parkinsonism Mother      Atrial fibrillation Father      Heart Failure Father      Prostate Cancer Father      Skin Cancer Father      Anorexia nervosa Daughter      Other - See Comments Granddaughter         premature birth     SOCIAL HISTORY:  Social History     Socioeconomic History     Marital status:      Spouse name: Cheryl     Number of children: 2   Occupational History     Occupation: Construction      Employer: S[employer]   Tobacco Use     Smoking status: Former     Current packs/day: 0.00     Average packs/day: 1 pack/day for 15.0 years (15.0 ttl pk-yrs)     Types: Cigarettes     Quit date: 2023     Years since quittin.1     Smokeless tobacco: Former     Quit date: 3/15/2023   Vaping Use     Vaping status: Never Used   Substance and Sexual Activity     Alcohol use: No     Alcohol/week: 0.0 standard drinks of alcohol     Drug use: No     Sexual activity: Yes     Partners: Female     Birth control/protection: Condom   Other Topics Concern     Parent/sibling w/ CABG, MI or angioplasty before 65F 55M? Yes     Comment: both parents had stints placed      Social Drivers of Health     Financial Resource Strain: Low Risk  (9/3/2024)    Financial Resource Strain      Within the past 12 months, have you or your family members you live with been unable to get utilities (heat, electricity) when it was really needed?: No   Food Insecurity: Low Risk  (9/3/2024)    Food Insecurity      Within the past 12 months, did you worry that your food would run out before you got money to buy more?: No      Within the  past 12 months, did the food you bought just not last and you didn t have money to get more?: No   Transportation Needs: Low Risk  (9/3/2024)    Transportation Needs      Within the past 12 months, has lack of transportation kept you from medical appointments, getting your medicines, non-medical meetings or appointments, work, or from getting things that you need?: No   Physical Activity: Unknown (2/22/2021)    Received from [facility]    Exercise Vital Sign      Days of Exercise per Week: 0 days   Stress: No Stress Concern Present (2/22/2021)    Received from [facility]    [employer] [place] [employer] - Occupational Stress Questionnaire      Feeling of Stress : Only a little   Social Connections: Moderately Isolated (2/22/2021)    Received from [facility]    Social Connection and Isolation Panel [NHANES]      Frequency of Communication with Friends and Family: Twice a week      Frequency of Social Gatherings with Friends and Family: Once a week      Attends Buddhist Services: Never      Active Member of Clubs or Organizations: No      Attends Club or Organization Meetings: Never      Marital Status:    Interpersonal Safety: Low Risk  (9/3/2024)    Interpersonal Safety      Do you feel physically and emotionally safe where you currently live?: Yes      Within the past 12 months, have you been hit, slapped, kicked or otherwise physically hurt by someone?: No      Within the past 12 months, have you been humiliated or emotionally abused in other ways by your partner or ex-partner?: No   Housing Stability: Low Risk  (9/3/2024)    Housing Stability      Do you have housing? : Yes      Are you worried about losing your housing?: No     CURRENT MEDICATIONS:  Current Outpatient Medications   Medication Sig Dispense Refill     acetaminophen (TYLENOL) 325 MG tablet Take 2 tablets (650 mg) by mouth every 4 hours as needed for other (For optimal non-opioid  multimodal pain management to improve pain control.) 100 tablet 0     amiodarone (PACERONE) 200 MG tablet Take 0.5 tablets (100 mg) by mouth daily. 45 tablet 3     amoxicillin (AMOXIL) 500 MG tablet Take 4 tablets (2,000 mg) by mouth as needed (Take one hour before dental procedure.) 4 tablet 2     bumetanide (BUMEX) 1 MG tablet Take 1 tablet (1 mg) by mouth daily as needed (for weight gain). 45 tablet 3     digoxin (LANOXIN) 250 MCG tablet Take 1 tablet (250 mcg) by mouth daily. 90 tablet 3     empagliflozin (JARDIANCE) 10 MG TABS tablet Take 1 tablet (10 mg) by mouth daily. 90 tablet 3     eplerenone (INSPRA) 25 MG tablet Take 12.5mg (1/2 tab) daily 45 tablet 3     JANTOVEN ANTICOAGULANT 5 MG tablet Take 0.5 to 1 tablet by mouth every day OR as directed by Anticoagulation Clinic 90 tablet 1     lisinopril (ZESTRIL) 5 MG tablet Take 1 tablet (5 mg) by mouth daily. 90 tablet 3     pantoprazole (PROTONIX) 20 MG EC tablet Take 1 tablet (20 mg) by mouth every morning (before breakfast). 90 tablet 3     sildenafil (VIAGRA) 50 MG tablet Take 1 tablet (50 mg) by mouth daily as needed (PRN for sexual activity) 30 tablet 0     No current facility-administered medications for this visit.     EXAM:  BP (!) 78/0 (BP Location: Right arm, Patient Position: Chair, Cuff Size: Adult Regular)   Pulse 91   Wt 83.9 kg (185 lb)   SpO2 97%   BMI 28.98 kg/m    GENERAL: Appears comfortable, in no acute distress.   HEENT: Eye symmetrical  CV: + LVAD hum. no adventitious sounds. JVP <6.   RESPIRATORY: Respirations regular, even, and unlabored. Lungs CTA throughout.   GI: Soft  and non distended.   EXTREMITIES: extremities warm and well perfused; no lower extremity edema. All extremities are warm and well perfused  NEUROLOGIC: Alert and interacting appropriately. No focal deficits.   SKIN: No jaundice.  Driveline site c/d/i.     Labs:  Lab Results   Component Value Date    WBC 11.9 (H) 01/17/2025    HGB 13.5 01/17/2025    HCT 45.6  01/17/2025     01/17/2025     01/15/2025    POTASSIUM 4.1 01/15/2025    CHLORIDE 97 (L) 01/15/2025    CO2 27 01/15/2025    BUN 18.6 01/15/2025    CR 1.22 (H) 01/15/2025    GLC 96 01/15/2025    DD 3.60 (H) 04/18/2023    NTBNPI 2,071 (H) 09/03/2024    NTBNP 2,687 (H) 12/11/2024    TROPI 0.033 01/09/2017    AST 32 01/15/2025    ALT 31 01/15/2025    ALKPHOS 95 01/15/2025    BILITOTAL 0.5 01/15/2025    INR 2.3 02/26/2025     Diagnostics:  7/22/24 ICD check  Device: [employer] D150 DYNAGEN  Normal Device Function  Mode: VVI 40 bpm  : 0%  Presenting EGM: VS 58 bpm  Lead Trends Appear Stable.  Estimated battery longevity to RRT = 10 years.  Atrial Arrhythmia: N/A  Anticoagulant: Warfarin  Ventricular Arrhythmia: None  Pt Notified of Transmission Results: Levels [facility]     4/10/24 ICD check  Device: [employer] D150 DYNAGEN  Normal device function  Mode: VVI 40 bpm  : <1%  Intrinsic rhythm: VS @ 83 bpm  Lead Trends Appear Stable: Yes  Estimated battery longevity to RRT = 10.5 years  Ventricular Arrhythmia: 0  Setting Changes: RV amplitude decreased to 2.4 V.  Patient has an appointment to see Dr. Montgomery today.   Plan: Device follow-up every 3 months.  DEL Gao RN     4/1/24 [facility]  RA: --/--/15 mm Hg  RV: 60/15 mm Hg  PA: 60/30 (43) mm Hg  PCWP: --/--/24 mm Hg  CO/CI: 3.6/1.8 (TD); 3.3/1.7 (Carlos)   PVR: 5.2 (TD) LING   Right sided filling pressures are moderately elevated. Left sided filling pressures are moderately elevated. Moderately elevated pulmonary artery hypertension. Reduced cardiac output level. Hemodynamic data has been modified in Epic per physician review.       December 27, 2023 echo  Borderline-dilated LV with severely reduced global LV function, LVEF<20%.  LVIDd=5/8 cm.  RV function appears moderately reduced on limited views.  The aortic valve appears to remains closed on limited views. There is no AI.  LVAD inflow cannula is visualized in the LV apex. LVAD outflow graft is  visualized  in the aorta. Normal Doppler interrogation of the LVAD inflow  cannula and outflow graft.  This study was compared with the study from 5/3/23: No significant changes  noted.  ___________     8/7/23 RHC  RA 8  RV 39/8  PA 39/15/24  Wedge 7  Cardiac output 4.3, cardiac index 2.3 by thermo  Cardiac output 3,   cardiac index 1.59 by Carlos  PVR: 2 Right sided filling pressures are normal. Left sided filling pressures are normal. Mild elevated pulmonary hypertension. Normal cardiac output level.    Pressures Phase: Baseline       Time Systolic (mmHg) Diastolic (mmHg) Mean (mmHg) A Wave (mmHg) V Wave (mmHg) EDP (mmHg) Max dp/dt (mmHg/sec) HR (bpm) Content (mL/dL) SAT (%)   RA Pressures 12:09 PM     8    9    13        75          RV Pressures 12:15 PM 39            9      74          PA Pressures 12:16 PM 39    15    24            74          PCW Pressures 12:15 PM     7            75          Art Pressures 11:59 AM 99    75    83            77             Blood Flow Results Phase: Baseline    Time Results Indexed Values (L/min/m2)   QP 11:55 AM 3.07 L/min    1.59      QS 11:55 AM 3.07 L/min    1.59         Blood Oximetry Phase: Baseline    Time Hb SAT(%) PO2 Content (mL/dL) PA Sat (%)   PA 11:55 AM   59 %        59      Art 11:55 AM   100 %      19.18           TTE 3/28/2023  Technically difficult study.Extremely poor acoustic windows.  Limited information was obtained during study.  LVAD cannula was seen in the expected anatomic position in the LV apex.  HM3 at 5200RPM.  Septum normal.  LVIDd 56mm.  Aortic valve not well visualized. No AI.  Normal outflow velocity.  Global right ventricular function is moderately to severely reduced.  Small pericardial effusion with organizing material in pericardial cavity.     TTE 5/3/23:  Please refer to the EPIC report for measurements performed at different LVAD speed settings.  HM3 at 5200RPM at baseline.  LVIDd 43mm.  Septum normal.  Aortic valve remain closed at baseline.     TTE  3/4/25:  HM3 LVAD at 5100 RPM.  Left ventricular function is decreased. The ejection fraction is 10-20% (severely reduced). Moderate left ventricular dilation is present. LVIDd:6.4 cm  Global right ventricular function is moderately reduced. Normal Doppler interrogation of the LVADoutflow graft. s/p TV annuloplasty with 32 mm MC3 partial ring  The inferior vena cava was normal in size with preserved respiratory variability.  No significant valvular abnormalities present.   This study was compared with the study from 9/5/2024. No significant changes noted.    [facility] 3/4/25:  Pressures Phase: Baseline   Time Systolic (mmHg) Diastolic (mmHg) Mean (mmHg) A Wave (mmHg) V Wave (mmHg) EDP (mmHg) Max dp/dt (mmHg/sec) HR (bpm) Content (mL/dL) SAT (%)   RA Pressures 12:07 PM   11    15    15      76        RV Pressures 11:53 AM       816          12:08 PM 42        11     71        PA Pressures 12:10 PM 42    23    31        67        PCW Pressures 12:09 PM   14    16    18      77        AO Pressures 11:56     64    78        91        Blood Flow Results Phase: Baseline   Time Results Indexed Values (L/min/m2)   QP 11:53 AM 3.91 L/min    1.96      QS 11:53 AM 3.91 L/min    1.96          Assessment and Plan:   Akshat Fragoso is a 59 year old male with history of HFrEF 2/2 NICM (diagnosed in 1/2017) s/p ICD placement in 6/2017, VT/VF arrest on 3/15/2023 requiring CPR for 6 mins with cardiogenic shock s/p HM3 LVAD implantation as DT 3/23/2023 c/b postop AF (on amiodarone and digoxin) and HAP, moderate TR s/p TV annuloplasty with 32 mm MC3 partial ring 3/23/2023, PFO closure 3/23/2023, chronic tobacco/marijuana use, COPD, HTN, CKD stage III, who presents today for follow-up.     He is doing well no new issues or concerns.  LVAD interrogated at bedside.  Speed remains at 5100 RPM.  My only concern is that overall the cardiac index is low despite the LVAD support.  The left ventricle is also dilated.  However he does not have  enough wedge pressure to increase the speed and especially he is telling us that he felt horrible with the increased speed to 5200 before with severe vision changes and palpitations.  Might have been suction events.  He also does not have clear evidence for RV failure in the setting of essentially normal artery pressure.  Nevertheless we will try to hydrate him little bit more no changes.  Today and we will repeat a right heart catheterization in about 3 to 4 months to recheck the pressures.  If there is any concern for worsening cardiac output or increasing wedge pressure time we will do a CT of the chest to look for any outflow graft stenosis.     # Chronic systolic heart failure secondary to NICM with cardiogenic shock s/p HM III on 3/23/23 LVAD. ACC/AHA Stage D, NYHA Class III  Moderate TR s/p TV annuloplasty with 32 mm MC3 partial ring 3/23/2023.     Fluid status: euvolemic - will continue Bumex.   ACEi/ARB start entresto 12/13mg BID with washout from lisinopril.  Instructed patient to monitor his blood pressure with initiation of the Entresto.  BB deferred given recent LVAD implant  Aldosterone antagonist eplerenone 12.5 mg daily  SGLT-2: continue empagliflozin 10mg daily  SCD prophylaxis ICD, which was interrogated today.  NSAID use: contraindicated  Sleep Apnea Evaluation: not discussed today  BP: MAP goal 65-85, at goal  LDH trends:  Anticoagulation: warfarin INR goal 2-3  Antiplatelet: Off ASA  Advanced therapies consideration: Patient is interested in moving forward with transplant listing, we will contact the coordinators to facilitate necessary workup for committee discussion.     # Afib with RVR, resolved.   - Continue Amiodarone and Digoxin.  - Warfarin with INR goal 2-3     # Moderate TR s/p TV annuloplasty with 32 mm MC3 partial ring 3/23/2023  PFO closure 3/23/2023  - Trend on ECHOs        # HTN.   - MAP within goal, continue the above regimen    I appreciate the opportunity to participate in the  care of Akshat TANMAY [place] . Please do not hesitate to contact me with any further questions.  I have spent a total of 40 minutes today reviewing labs, imaging studies, discussing with colleagues, face-to-face time with patient and documentation in the medical record.  The longitudinal plan of care for the diagnosis(es)/condition(s) as documented were addressed during this visit. Due to the added complexity in care, I will continue to support Akshat in the subsequent management and with ongoing continuity of care.    Sincerely,   Shaun Aguiar MD  [facility] Division of Cardiology         Please do not hesitate to contact me if you have any questions/concerns.     Sincerely,     Shaun Aguiar MD

## 2025-03-04 NOTE — PROGRESS NOTES
ANTICOAGULATION MANAGEMENT     Akshat Fragoso 59 year old male is on warfarin with therapeutic INR result. (Goal INR 2.0-3.0)    Recent labs: (last 7 days)     03/04/25  0855   INR 2.02*       ASSESSMENT     Source(s): Chart Review     Warfarin doses taken: Reviewed in chart  Diet: No new diet changes identified  Medication/supplement changes:  Cards OV today-DECREASE digoxin to 125 mcg daily.   New illness, injury, or hospitalization: Yes: RHC today  Signs or symptoms of bleeding or clotting: No  Previous result: Therapeutic last 2(+) visits  Additional findings: None       PLAN     Recommended plan for no diet, medication or health factor changes affecting INR     Dosing Instructions: Continue your current warfarin dose with next INR in 1 week       Summary  As of 3/4/2025      Full warfarin instructions:  2.5 mg every Sun, Tue, Thu; 5 mg all other days   Next INR check:  3/11/2025               Detailed voice message left for Akshat with dosing instructions and follow up date.     Patient to recheck with home meter    Education provided: Please call back if any changes to your diet, medications or how you've been taking warfarin  Goal range and lab monitoring: goal range and significance of current result and Importance of following up at instructed interval  Contact [phone] with any changes, questions or concerns.     Plan made per ACC anticoagulation protocol and per LVAD protocol    TREV LYN RN  3/4/2025  Anticoagulation Clinic  [facility] for routing messages: earnest ANTICOAG LVAD  [facility] patient phone line: [phone]        _______________________________________________________________________     Anticoagulation Episode Summary       Current INR goal:  2.0-3.0   TTR:  93.8% (11.8 mo)   Target end date:  Indefinite   Send INR reminders to:  ROS LVAD    Indications    Acute on chronic systolic congestive heart failure (H) [I50.23]  LVAD (left ventricular assist device) present (H) [Z95.811]  Chronic  systolic congestive heart failure (H) [I50.22]  Long term use of drug [Z79.899]  Left ventricular assist device present (H) [Z95.811]  Anticoagulated on Coumadin [Z79.01]             Comments:  Follow VAD Anticoag protocol:Yes: HeartMate 3   Bridging: Enoxaparin   Date VAD placed: 3/23/23  [employer] home monitor             Anticoagulation Care Providers       Provider Role Specialty Phone number    Kenzie Moreau MD Referring Advanced Heart Failure and Transplant Cardiology [phone]    Mile Bolivar, APRN CNP Referring Nurse Practitioner [phone]    Shaun Aguiar MD Referring Cardiovascular Disease [phone]

## 2025-03-04 NOTE — NURSING NOTE
MCS VAD Pump Info       Row Name 03/04/25 1400 03/04/25 1218 03/04/25 1011       MCS VAD Information    Implant LVAD -- --    LVAD Pump HeartMate 3 -- --       Heartmate 3 LEFT VS    Flow (Lpm) 4.2 Lpm 4.2 Lpm 4.4 Lpm    Pulse Index (PI) 4.3 PI 3.9 PI 3.5 PI    Speed (rpm) 5100 rpm 5100 rpm 5100 rpm    Power (mari) 3.6 mari 3.6 mari 3.7 mari    Current Hct setting 49 -- --    Retired: Unexpected Alarms -- -- --       Primary Controller    Serial number [id number] -- --    Low flow alarm setting 2.5 -- --    High watt alarm setting -- -- --    EBB: Patient use 4 -- --    Replace in 23 Months -- --       Backup Controller    Serial number [id number] -- --    EBB: Patient use 4 -- --    Replace EBB in 7 Months -- --    Speed & HCT match primary controller -- -- --       VAD Interrogation    Alarms reported by patient N -- --    Unexpected alarms noted upon interrogation None -- --    PI events Occasional  2.2-5.5 1 week history -- --    Damage to equipment is noted N -- --    Action taken Reviewed proper equipment care and maintenance -- --       Driveline Exit Site    Dressing change done N -- --    Driveline properly secured Yes -- --    DLES assessment c/d/i per pt report -- --    Dressing used Daily kit -- --    Frequency patient changes dressing Daily -- --    Dressing modifications -- -- --    Dressing change supplier -- -- --                    Education Complete: Yes   Charge the BACKUP controller s backup battery every 6 months  Perform a self test on BACKUP every 6 months  Change the MPU s batteries every 6 months:Yes  Have equipment serviced yearly (if applicable):Yes    Reviewed laminated flashcard to be kept in back-up bag. Reviewed equipment names and functions.

## 2025-03-04 NOTE — Clinical Note
dry, intact, no bleeding and no hematoma. 7fr RIJ sheath removed, manual pressure held to hemostasis. Primapore

## 2025-03-04 NOTE — PROGRESS NOTES
"Consenting/Education for Cardiology Procedure: Right heart catheterization     Patient understands we would like to perform the listed procedure(s) due to HFrEF with LVAD in place.    The patient understands the following:     The procedure was described to the patient in detail.    No sedation is planned for this procedure. Patient understands risks and complications of the procedure which include but are not limited to bruising/swelling around the incision site, infection, bleeding, allergic reaction to local anesthetic, air embolism, arterial puncture, stroke, heart attack, need for emergency heart surgery, death.       Patient verbalized understanding of risks and benefits and has elected to proceed with the procedure or procedures listed above.    INR 2.02    Clinically Significant Risk Factors Present on Admission               # Drug Induced Coagulation Defect: home medication list includes an anticoagulant medication    # Hypertension: Home medication list includes antihypertensive(s)  # End stage heart failure: Ventricular assist device (VAD) present          # Obesity: Estimated body mass index is 30.43 kg/m  as calculated from the following:    Height as of this encounter: 1.702 m (5' 7\").    Weight as of this encounter: 88.1 kg (194 lb 4.8 oz).        # ICD device present  # Pacemaker present      Cardiovascular : Cardiac arrest  Cardiomyopathy  Nephrology: Stage 3 (unspecified if a or b) (GFR 30 - 59)      Kya Naranjo, CNP  Cardiology     "

## 2025-03-04 NOTE — NURSING NOTE
Chief Complaint   Patient presents with    Follow Up     RETURN VAD       Vitals were taken, medications reconciled.    ELYSSA Shaw    2:30 PM

## 2025-03-04 NOTE — PROGRESS NOTES
Discharge paperwork reviewed with patient, pt stated understanding. wife Cheryl will transport patient home

## 2025-03-04 NOTE — TELEPHONE ENCOUNTER
Cath Lab Case Request/Order    Location: [address] [address] [facility] Waiting Room    Procedure: Right Heart Cath (RHC)    Procedure Date: 05/16    Patient Arrival Time: 7:00 AM    Procedure Time: 0830 (pending emergency)    Ordering Provider: Dr. Shaun Aguiar    Performing Cardiologist: Dr. Shaun Aguiar    Inpatient Bed Needed: No    Post-  Procedure RABIA appointment scheduled (1 - 2 weeks): N/A, RHC     Date: N/A     Provider: N/A    Communicated Patient Instructions:     NPO, nothing to eat 8 hours and drink 2 hours before arrival time: No     , need to arrange a ride home - unable to drive post- procedure: N/A, RHC     Adult at home, need a responsible adult to stay with patient 24 hours post- procedure: N/A, RHC    Appointment was scheduled: Face to Face    Patient expressed understanding of above instructions and denied further questions at this time.    Mike Yates

## 2025-03-04 NOTE — NURSING NOTE
[facility] VAD Pump Info       Row Name 03/04/25 1400 03/04/25 1218 03/04/25 1011       MCS VAD Information    Implant LVAD -- --    LVAD Pump HeartMate 3 -- --       Heartmate 3 LEFT VS    Flow (Lpm) 4.2 Lpm 4.2 Lpm 4.4 Lpm    Pulse Index (PI) 4.3 PI 3.9 PI 3.5 PI    Speed (rpm) 5100 rpm 5100 rpm 5100 rpm    Power (mari) 3.6 mari 3.6 mrai 3.7 mari    Current Hct setting 49 -- --    Retired: Unexpected Alarms -- -- --       Primary Controller    Serial number [id number] -- --    Low flow alarm setting 2.5 -- --    High watt alarm setting -- -- --    EBB: Patient use 4 -- --    Replace in 23 Months -- --       Backup Controller    Serial number [id number] -- --    EBB: Patient use 4 -- --    Replace EBB in 7 Months -- --    Speed & HCT match primary controller -- -- --       VAD Interrogation    Alarms reported by patient N -- --    Unexpected alarms noted upon interrogation None -- --    PI events Occasional  2.2-5.5 1 week history -- --    Damage to equipment is noted N -- --    Action taken Reviewed proper equipment care and maintenance -- --       Driveline Exit Site    Dressing change done N -- --    Driveline properly secured Yes -- --    DLES assessment c/d/i per pt report -- --    Dressing used Daily kit -- --    Frequency patient changes dressing Daily -- --    Dressing modifications -- -- --    Dressing change supplier -- -- --                    Education Complete: Yes   Charge the BACKUP controller s backup battery every 6 months  Perform a self test on BACKUP every 6 months  Change the MPU s batteries every 6 months:Yes  Have equipment serviced yearly (if applicable):Yes    Reviewed laminated flashcard to be kept in back-up bag. Reviewed equipment names and functions.

## 2025-03-04 NOTE — PROGRESS NOTES
Patient arrived to room via litter with cath lab RN s/p RHC . VSS. Denies Pt alert and oriented x4. Right internal jugular  site CDI.

## 2025-03-04 NOTE — PATIENT INSTRUCTIONS
It was a pleasure to see you in clinic today, please do not hesitate to call us for questions or concerns.  Your next automatic remote ICD check from home is scheduled for 6/9/2025.    Yumiko Antonio RN    Electrophysiology Nurse Clinician  [facility]    During Business Hours Please Call:  [phone]  After Hours Please Call:  [phone] - select option #4 and ask for job code [phone]

## 2025-03-04 NOTE — DISCHARGE INSTRUCTIONS
[facility]                        Interventional Cardiology  Discharge Instructions   Post Right Heart Cath   AFTER YOU GO HOME:  DO drink plenty of fluids  DO resume your regular diet and medications unless otherwise instructed by your Primary Physician  Do Not scrub the procedure site vigorously  No lotion or powder to the puncture site for 3 days    CALL YOUR PRIMARY PHYSICIAN IF: You may resume all normal activity.  Monitor neck site for bleeding, swelling, or voice changes. If you notice bleeding or swelling immediately apply pressure to the site and call number below to speak with Cardiology Fellow.  If you experience any changes in your breathing you should call your doctor immediately or come to the closest Emergency Department.  Do not drive yourself.    ADDITIONAL INSTRUCTIONS: Medications: You are to resume all home medications including anticoagulation therapy unless otherwise advised by your primary cardiologist or nurse coordinator.    Follow Up: Per your primary cardiology team    If you have any questions or concerns regarding your procedure site please call [phone] at anytime and ask for Cardiology Fellow on call.  They are available 24 hours a day.  You may also contact the Cardiology Clinic after hours number at [phone].                                                       Telephone Numbers [phone] Monday-Friday 8:00 am to 4:30 pm    [phone] After 4:30 pm Monday-Friday, Weekends & Holidays  Ask for Interventional Cardiologist on call. Someone is on call 24 hours/day   [facility] toll free number [phone] Monday-Friday 8:00 am to 4:30 pm   [facility] Emergency Dept [phone]

## 2025-03-04 NOTE — PROGRESS NOTES
Pt arrived to 2A from home for Right heart cath. VSS. Denies pain. Consent obtained  . Lab resulted. H&P current. Allergies reviewed with pt. Appropriately NPO.  Prep completed. Wife at bedside; will be transporting patient to home

## 2025-03-05 LAB
MDC_IDC_LEAD_CONNECTION_STATUS: NORMAL
MDC_IDC_LEAD_IMPLANT_DT: NORMAL
MDC_IDC_LEAD_LOCATION: NORMAL
MDC_IDC_LEAD_LOCATION_DETAIL_1: NORMAL
MDC_IDC_LEAD_MFG: NORMAL
MDC_IDC_LEAD_MODEL: NORMAL
MDC_IDC_LEAD_POLARITY_TYPE: NORMAL
MDC_IDC_LEAD_SERIAL: NORMAL
MDC_IDC_LEAD_SPECIAL_FUNCTION: NORMAL
MDC_IDC_MSMT_BATTERY_DTM: NORMAL
MDC_IDC_MSMT_BATTERY_REMAINING_LONGEVITY: 166 MO
MDC_IDC_MSMT_BATTERY_RRT_TRIGGER: NORMAL
MDC_IDC_MSMT_BATTERY_STATUS: NORMAL
MDC_IDC_MSMT_BATTERY_VOLTAGE: 3.09 V
MDC_IDC_MSMT_CAP_CHARGE_DTM: NORMAL
MDC_IDC_MSMT_CAP_CHARGE_ENERGY: 18 J
MDC_IDC_MSMT_CAP_CHARGE_TIME: 3.5 S
MDC_IDC_MSMT_CAP_CHARGE_TYPE: NORMAL
MDC_IDC_MSMT_LEADCHNL_RV_IMPEDANCE_VALUE: 285 OHM
MDC_IDC_MSMT_LEADCHNL_RV_IMPEDANCE_VALUE: 399 OHM
MDC_IDC_MSMT_LEADCHNL_RV_PACING_THRESHOLD_AMPLITUDE: 1 V
MDC_IDC_MSMT_LEADCHNL_RV_PACING_THRESHOLD_PULSEWIDTH: 0.4 MS
MDC_IDC_MSMT_LEADCHNL_RV_SENSING_INTR_AMPL: 14.4 MV
MDC_IDC_PG_IMPLANT_DTM: NORMAL
MDC_IDC_PG_MFG: NORMAL
MDC_IDC_PG_MODEL: NORMAL
MDC_IDC_PG_SERIAL: NORMAL
MDC_IDC_PG_TYPE: NORMAL
MDC_IDC_SESS_CLINIC_NAME: NORMAL
MDC_IDC_SESS_DTM: NORMAL
MDC_IDC_SESS_TYPE: NORMAL
MDC_IDC_SET_BRADY_HYSTRATE: NORMAL
MDC_IDC_SET_BRADY_LOWRATE: 40 {BEATS}/MIN
MDC_IDC_SET_BRADY_MODE: NORMAL
MDC_IDC_SET_LEADCHNL_RA_SENSING_SENSITIVITY: NORMAL
MDC_IDC_SET_LEADCHNL_RV_PACING_AMPLITUDE: 2 V
MDC_IDC_SET_LEADCHNL_RV_PACING_ANODE_ELECTRODE_1: NORMAL
MDC_IDC_SET_LEADCHNL_RV_PACING_ANODE_LOCATION_1: NORMAL
MDC_IDC_SET_LEADCHNL_RV_PACING_CAPTURE_MODE: NORMAL
MDC_IDC_SET_LEADCHNL_RV_PACING_CATHODE_ELECTRODE_1: NORMAL
MDC_IDC_SET_LEADCHNL_RV_PACING_CATHODE_LOCATION_1: NORMAL
MDC_IDC_SET_LEADCHNL_RV_PACING_POLARITY: NORMAL
MDC_IDC_SET_LEADCHNL_RV_PACING_PULSEWIDTH: 0.4 MS
MDC_IDC_SET_LEADCHNL_RV_SENSING_ANODE_ELECTRODE_1: NORMAL
MDC_IDC_SET_LEADCHNL_RV_SENSING_ANODE_LOCATION_1: NORMAL
MDC_IDC_SET_LEADCHNL_RV_SENSING_CATHODE_ELECTRODE_1: NORMAL
MDC_IDC_SET_LEADCHNL_RV_SENSING_CATHODE_LOCATION_1: NORMAL
MDC_IDC_SET_LEADCHNL_RV_SENSING_POLARITY: NORMAL
MDC_IDC_SET_LEADCHNL_RV_SENSING_SENSITIVITY: 0.45 MV
MDC_IDC_SET_ZONE_DETECTION_BEATS_DENOMINATOR: 16 {BEATS}
MDC_IDC_SET_ZONE_DETECTION_BEATS_DENOMINATOR: 32 {BEATS}
MDC_IDC_SET_ZONE_DETECTION_BEATS_DENOMINATOR: 40 {BEATS}
MDC_IDC_SET_ZONE_DETECTION_BEATS_NUMERATOR: 16 {BEATS}
MDC_IDC_SET_ZONE_DETECTION_BEATS_NUMERATOR: 30 {BEATS}
MDC_IDC_SET_ZONE_DETECTION_BEATS_NUMERATOR: 32 {BEATS}
MDC_IDC_SET_ZONE_DETECTION_INTERVAL: 240 MS
MDC_IDC_SET_ZONE_DETECTION_INTERVAL: 280 MS
MDC_IDC_SET_ZONE_DETECTION_INTERVAL: 330 MS
MDC_IDC_SET_ZONE_DETECTION_INTERVAL: 450 MS
MDC_IDC_SET_ZONE_STATUS: NORMAL
MDC_IDC_SET_ZONE_TYPE: NORMAL
MDC_IDC_SET_ZONE_VENDOR_TYPE: NORMAL
MDC_IDC_STAT_AT_BURDEN_PERCENT: 0.68 %
MDC_IDC_STAT_AT_DTM_END: NORMAL
MDC_IDC_STAT_AT_DTM_START: NORMAL
MDC_IDC_STAT_BRADY_DTM_END: NORMAL
MDC_IDC_STAT_BRADY_DTM_START: NORMAL
MDC_IDC_STAT_BRADY_RA_PERCENT_PACED: NORMAL
MDC_IDC_STAT_BRADY_RV_PERCENT_PACED: 0.1 %
MDC_IDC_STAT_EPISODE_RECENT_COUNT: 0
MDC_IDC_STAT_EPISODE_RECENT_COUNT_DTM_END: NORMAL
MDC_IDC_STAT_EPISODE_RECENT_COUNT_DTM_START: NORMAL
MDC_IDC_STAT_EPISODE_TOTAL_COUNT: 0
MDC_IDC_STAT_EPISODE_TOTAL_COUNT: 3
MDC_IDC_STAT_EPISODE_TOTAL_COUNT_DTM_END: NORMAL
MDC_IDC_STAT_EPISODE_TOTAL_COUNT_DTM_START: NORMAL
MDC_IDC_STAT_EPISODE_TYPE: NORMAL
MDC_IDC_STAT_TACHYTHERAPY_ATP_DELIVERED_RECENT: 0
MDC_IDC_STAT_TACHYTHERAPY_ATP_DELIVERED_TOTAL: 0
MDC_IDC_STAT_TACHYTHERAPY_RECENT_DTM_END: NORMAL
MDC_IDC_STAT_TACHYTHERAPY_RECENT_DTM_START: NORMAL
MDC_IDC_STAT_TACHYTHERAPY_SHOCKS_ABORTED_RECENT: 0
MDC_IDC_STAT_TACHYTHERAPY_SHOCKS_ABORTED_TOTAL: 0
MDC_IDC_STAT_TACHYTHERAPY_SHOCKS_DELIVERED_RECENT: 0
MDC_IDC_STAT_TACHYTHERAPY_SHOCKS_DELIVERED_TOTAL: 0
MDC_IDC_STAT_TACHYTHERAPY_TOTAL_DTM_END: NORMAL
MDC_IDC_STAT_TACHYTHERAPY_TOTAL_DTM_START: NORMAL
STFR SERPL-MCNC: 6.1 MG/L

## 2025-03-11 LAB — INR HOME MONITORING: 2 (ref 2–3)

## 2025-03-12 ENCOUNTER — ANTICOAGULATION THERAPY VISIT (OUTPATIENT)
Dept: ANTICOAGULATION | Facility: CLINIC | Age: 60
End: 2025-03-12
Payer: COMMERCIAL

## 2025-03-12 DIAGNOSIS — Z79.899 LONG TERM USE OF DRUG: ICD-10-CM

## 2025-03-12 DIAGNOSIS — I50.22 CHRONIC SYSTOLIC CONGESTIVE HEART FAILURE (H): ICD-10-CM

## 2025-03-12 DIAGNOSIS — Z95.811 LVAD (LEFT VENTRICULAR ASSIST DEVICE) PRESENT (H): ICD-10-CM

## 2025-03-12 DIAGNOSIS — Z95.811 LEFT VENTRICULAR ASSIST DEVICE PRESENT (H): ICD-10-CM

## 2025-03-12 DIAGNOSIS — I50.23 ACUTE ON CHRONIC SYSTOLIC CONGESTIVE HEART FAILURE (H): Primary | ICD-10-CM

## 2025-03-12 DIAGNOSIS — Z79.01 ANTICOAGULATED ON COUMADIN: ICD-10-CM

## 2025-03-12 NOTE — PROGRESS NOTES
ANTICOAGULATION MANAGEMENT     Akshat Fragoso 59 year old male is on warfarin with therapeutic INR result. (Goal INR 2.0-3.0)    Recent labs: (last 7 days)     03/11/25  0000   INR 2.0       ASSESSMENT     Source(s): Chart Review     Warfarin doses taken: Reviewed in chart  Diet:  JAMES  Medication/supplement changes:  Digoxin decreased 3/4/25.  New illness, injury, or hospitalization: [facility] 3/4/25  Signs or symptoms of bleeding or clotting: JAMES  Previous result: Therapeutic last 2(+) visits  Additional findings: None       PLAN     Recommended plan for no diet, medication or health factor changes and previous 2 INR results within goal affecting INR     Dosing Instructions: Continue your current warfarin dose with next INR in 1 week       Summary  As of 3/12/2025      Full warfarin instructions:  2.5 mg every Sun, Tue, Thu; 5 mg all other days   Next INR check:  3/18/2025               Detailed voice message left for Akshat with dosing instructions and follow up date.   Sent RPost message with dosing and follow up instructions    Patient to recheck with home meter    Education provided: Please call back if any changes to your diet, medications or how you've been taking warfarin  Goal range and lab monitoring: goal range and significance of current result  Symptom monitoring: monitoring for bleeding signs and symptoms  Importance of notifying anticoagulation clinic for: upcoming surgeries and procedures and health changes  Written instructions provided  Contact [phone] with any changes, questions or concerns.     Plan made per [facility] anticoagulation protocol and per LVAD protocol    Justina Wilkins RN  3/12/2025  Anticoagulation Clinic  [employer] for routing messages: earnest SOMMER LVAD  [facility] patient phone line: [phone]        _______________________________________________________________________     Anticoagulation Episode Summary       Current INR goal:  2.0-3.0   TTR:  93.7% (11.8 mo)   Target end date:   Indefinite   Send INR reminders to:  ANTICOAG LVAD    Indications    Acute on chronic systolic congestive heart failure (H) [I50.23]  LVAD (left ventricular assist device) present (H) [Z95.811]  Chronic systolic congestive heart failure (H) [I50.22]  Long term use of drug [Z79.899]  Left ventricular assist device present (H) [Z95.811]  Anticoagulated on Coumadin [Z79.01]             Comments:  Follow VAD Anticoag protocol:Yes: HeartMate 3   Bridging: Enoxaparin   Date VAD placed: 3/23/23  [employer] home monitor             Anticoagulation Care Providers       Provider Role Specialty Phone number    Kenzie Moreau MD Referring Advanced Heart Failure and Transplant Cardiology [phone]    Mile Bolivar, APRN CNP Referring Nurse Practitioner [phone]    hSaun Aguiar MD Referring Cardiovascular Disease [phone]

## 2025-03-14 NOTE — PATIENT INSTRUCTIONS
It was a pleasure to see you in clinic today, please do not hesitate to call us for questions or concerns.  We will be seeing after you have have your lead revision done and then 1 week later for an incision check and a device check.      Yumiko Antonio RN    Electrophysiology Nurse Clinician  [facility]    During Business Hours Please Call:  [phone]  After Hours Please Call:  [phone] - select option #4 and ask for job code [phone]     Please schedule patient for physical and pre labs.  She is due after April 8th.

## 2025-03-16 ENCOUNTER — CARE COORDINATION (OUTPATIENT)
Dept: CARDIOLOGY | Facility: CLINIC | Age: 60
End: 2025-03-16
Payer: COMMERCIAL

## 2025-03-17 ENCOUNTER — TELEPHONE (OUTPATIENT)
Dept: CARDIOLOGY | Facility: CLINIC | Age: 60
End: 2025-03-17
Payer: COMMERCIAL

## 2025-03-17 ENCOUNTER — CARE COORDINATION (OUTPATIENT)
Dept: CARDIOLOGY | Facility: CLINIC | Age: 60
End: 2025-03-17
Payer: COMMERCIAL

## 2025-03-17 DIAGNOSIS — Z79.899 ENCOUNTER FOR MONITORING DIGOXIN THERAPY: Primary | ICD-10-CM

## 2025-03-17 DIAGNOSIS — Z51.81 ENCOUNTER FOR MONITORING DIGOXIN THERAPY: Primary | ICD-10-CM

## 2025-03-17 DIAGNOSIS — Z95.811 LVAD (LEFT VENTRICULAR ASSIST DEVICE) PRESENT (H): ICD-10-CM

## 2025-03-17 DIAGNOSIS — I50.22 CHRONIC SYSTOLIC CONGESTIVE HEART FAILURE (H): ICD-10-CM

## 2025-03-17 NOTE — PROGRESS NOTES
D:  Pt/support person called, mycharted and left message to discuss current situation.  Pt reiterated previous complaints.  Informed writer earliest appt available 4/16.  Pt requested labs.  I:  Informed pt per Dr. Aguiar, no labs indicated currently, no earlier appts available- will place on waitlist, pump appears to be functioning as expected.  Instructed pt to call is he becomes symptomatic (worsened dizziness, SOB, chest pain, LVAD alarms, heart pounding) and he will be routed to the ER for in person evaluation.  Pt and wife agreeable.  A:  Follow up  P: Pt verbalized understanding of the instructions given.  Will call VAD coordinator with further needs and questions.

## 2025-03-17 NOTE — PROGRESS NOTES
Situation:   Writer spoke with  pt's spouse, Atul,  today to discuss continued VAD parameter concerns. Per Atul, speed has intermittently dropped to 4900rpm briefy, but recovered to set speed within 10 seconds. Patient endorses drinking more fluid since encounter yesterday, but still experiencing intermittent dizziness that self-resolves. Atul notes that this morning, PI was in the 6's, flow was 3.4L. Pt denies s/s of arrhythmia.     Background:  Weight today: Patient has not weighed himself today. Encouraged patient to get a weight when able.      Assessment:  NIBP/MAP: high 80's, per Atul was difficult to ascertain precise value  VAD parameters:      Symptoms:   Heart failure symptoms: unchanged from yesterday's encounter.   Symptoms are same.     Recommendation:  Will discuss with Dr. Aguiar. Will plan to call patient back and request that a remote transmission be sent in for verification of normal rhythm and will place lab orders to check digoxin level and BMP with his routine INR today. Will connect with patient's primary VAD coordinator to gain more insight regarding patient's baseline condition, as pt and spouse requesting in-person assessment of equipment, not necessarily indicated per this writer at this time.  Atul  notified to page on-call coordinator if symptoms worsen or with other concerns.  Atul and patient  verbalized understanding.

## 2025-03-17 NOTE — TELEPHONE ENCOUNTER
Patient Contacted for the patient to call back and schedule the following:    Appointment type: Return Vad  Provider: April  Return date: 04/16  Specialty phone number: [phone] opt 1  Additional appointment(s) needed: Labs  Additonal Notes: N/A

## 2025-03-18 ENCOUNTER — CARE COORDINATION (OUTPATIENT)
Dept: CARDIOLOGY | Facility: CLINIC | Age: 60
End: 2025-03-18
Payer: COMMERCIAL

## 2025-03-18 DIAGNOSIS — Z95.811 LVAD (LEFT VENTRICULAR ASSIST DEVICE) PRESENT (H): ICD-10-CM

## 2025-03-18 DIAGNOSIS — Z51.81 ENCOUNTER FOR MONITORING DIGOXIN THERAPY: ICD-10-CM

## 2025-03-18 DIAGNOSIS — Z79.01 ANTICOAGULATED ON WARFARIN: ICD-10-CM

## 2025-03-18 DIAGNOSIS — Z79.899 ENCOUNTER FOR MONITORING DIGOXIN THERAPY: ICD-10-CM

## 2025-03-18 DIAGNOSIS — I50.22 CHRONIC SYSTOLIC CONGESTIVE HEART FAILURE (H): Primary | ICD-10-CM

## 2025-03-18 NOTE — PROGRESS NOTES
LVAD Clinic  RABIA follow-up     Akshat Fragoso is a 59 year old male with history of HFrEF 2/2 NICM (diagnosed in 1/2017) s/p ICD placement in 6/2017, VT/VF arrest on 3/15/2023 requiring CPR for 6 mins with cardiogenic shock s/p HM3 LVAD implantation as DT 3/23/2023 c/b postop AF (on amiodarone and digoxin) and HAP, moderate TR s/p TV annuloplasty with 32 mm MC3 partial ring 3/23/2023, PFO closure 3/23/2023, chronic tobacco/marijuana use, COPD, HTN, CKD stage III, who presents today for post LVAD implant follow up.     He was admitted on 3/15/2023 for cardiac arrest in the setting of VT that was slower than his programmed VT threshold for intervention. Patient had been undergoing workup at [facility] for LVAD as destination therapy (initially evaluated for transplant though patient having trouble quitting marijuana/tobacco use). He underwent HM3 LVAD implantation on 3/23/2023 with postop course c/b AF with RVR requiring amiodarone gtt, volume overload, and HAP treated with IV vancomycin and zosyn for 5 days. He was discharged to home on 4/9/2023.     He was admitted from 9/3/24-9/6/24 for an inappropriate ICD shock (he had an RV lead dysfunction with oversensince (microperforation). He underwent lead and battery exchange of his device on 9/5/24. He did get a lisinopril washout and was stared on entresto. Otherwise no significant cardiology medication changes.    Today  HE is seeing me today because he was having a lightheadedness. This was associated with flows down to 3.5 (from about 4)) and Pis of to 5-6, usually runs in the 3. This seems to have cooralated with taking a 0.5 mg of bumex a few days. When he was lightheaded his vision felt more blurry, but no other neuro symptoms. MAPs were also elevated about 80-90. Symptoms have been resolved for the past 2 days. No LE edema or abdominal edema. He takes bumex when his weight is up or when he has a nocturnal cough.     No blood in the urine or blood in the stool. No  prolonged nosebleeds.      No driveline concerns. No fever or chills.     Current cardiac medications   - Lisinopril 5 mg daily  - Inspra 12.5 m gdaily  - Empagliflozin 10 mg daily  - Digoxin 125 mcg daily  - Amiodarone 100 mg daily  - Warfarin (INR goal 2-3)      PAST MEDICAL HISTORY:  Past Medical History:   Diagnosis Date    Acute right lumbar radiculopathy 11/02/2015    Acute systolic heart failure (H) 01/10/2017    Cardiomyopathy (H) 01/10/2017    CKD (chronic kidney disease) stage 3, GFR 30-59 ml/min (H) 01/30/2020    JOHNSON (dyspnea on exertion)     ED (erectile dysfunction) 10/02/2019    Erectile dysfunction     ICD (implantable cardioverter-defibrillator) in place- [employer], single chamber- NOT dependent 10/09/2017    Personal history of smoking 01/04/2017    Pulmonary nodules 01/17/2017    CT 11/2018:  Bilateral pulmonary nodules measuring up to 4 mm. No new or enlarging pulmonary nodules.     FAMILY HISTORY:  Family History   Problem Relation Age of Onset    Parkinsonism Mother     Atrial fibrillation Father     Heart Failure Father     Prostate Cancer Father     Skin Cancer Father     Anorexia nervosa Daughter     Other - See Comments Granddaughter         premature birth     SOCIAL HISTORY:  Social History     Socioeconomic History    Marital status:      Spouse name: Cheryl    Number of children: 2   Occupational History    Occupation: Construction      Employer: SELF   Tobacco Use    Smoking status: Former     Packs/day: 0.25     Years: 15.00     Pack years: 3.75     Types: Cigarettes    Smokeless tobacco: Former     Quit date: 3/15/2023   Vaping Use    Vaping status: Never Used   Substance and Sexual Activity    Alcohol use: No     Alcohol/week: 0.0 standard drinks of alcohol    Drug use: No    Sexual activity: Yes     Partners: Female     Birth control/protection: Condom   Other Topics Concern    Parent/sibling w/ CABG, MI or angioplasty before 65F 55M? Yes     Comment: both parents  had stints placed      CURRENT MEDICATIONS:  Current Outpatient Medications   Medication Sig Dispense Refill    acetaminophen (TYLENOL) 325 MG tablet Take 2 tablets (650 mg) by mouth every 4 hours as needed for other (For optimal non-opioid multimodal pain management to improve pain control.) 100 tablet 0    amiodarone (PACERONE) 200 MG tablet Take 0.5 tablets (100 mg) by mouth daily. 45 tablet 3    amoxicillin (AMOXIL) 500 MG tablet Take 4 tablets (2,000 mg) by mouth as needed (Take one hour before dental procedure.) 4 tablet 2    digoxin (LANOXIN) 125 MCG tablet Take 1 tablet (125 mcg) by mouth daily. 90 tablet 3    empagliflozin (JARDIANCE) 10 MG TABS tablet Take 1 tablet (10 mg) by mouth daily. 90 tablet 3    eplerenone (INSPRA) 25 MG tablet Take 12.5mg (1/2 tab) daily 45 tablet 3    furosemide (LASIX) 20 MG tablet Take 0.5 tablets (10 mg) by mouth as needed (weight gain or shortness of breath). Please call the VAD team before taking this medication 30 tablet 0    JANTOVEN ANTICOAGULANT 5 MG tablet Take 0.5 to 1 tablet by mouth every day OR as directed by Anticoagulation Clinic 90 tablet 1    lisinopril (ZESTRIL) 2.5 MG tablet Take 3 tablets (7.5 mg) by mouth daily. Through this week and the weekend. If you are feeling well on this dose, starting 3/24/25, increase to 10mg by mouth daily. 112 tablet 0    pantoprazole (PROTONIX) 20 MG EC tablet Take 1 tablet (20 mg) by mouth every morning (before breakfast). 90 tablet 3    sildenafil (VIAGRA) 50 MG tablet Take 1 tablet (50 mg) by mouth daily as needed (PRN for sexual activity) 30 tablet 0     No current facility-administered medications for this visit.     ROS:   See HPI     EXAM:  BP (!) 92/0 (BP Location: Right arm, Patient Position: Chair, Cuff Size: Adult Regular)   Wt 84.8 kg (187 lb)   SpO2 94%   BMI 29.29 kg/m    GENERAL: Appears comfortable, in no acute distress.   HEENT: Eye symmetrical  CV: Hum of Hm3, no adventitious sounds. JVP <6.   RESPIRATORY:  Respirations regular, even, and unlabored. Lungs CTA throughout.   GI: Soft  and non distended.   EXTREMITIES:No peripheral edema. All extremities are warm and well perfused  NEUROLOGIC: Alert and interacting appropriately. No focal deficits.   SKIN: No jaundice.  Driveline site c/d/i.       Labs:  Lab Results   Component Value Date    WBC 11.9 (H) 03/19/2025    HGB 14.3 03/19/2025    HCT 45.9 03/19/2025     03/19/2025     03/19/2025    POTASSIUM 4.4 03/19/2025    CHLORIDE 99 03/19/2025    CO2 26 03/19/2025    BUN 20.0 03/19/2025    CR 1.42 (H) 03/19/2025     (H) 03/19/2025    DD 3.60 (H) 04/18/2023    NTBNPI 2,071 (H) 09/03/2024    NTBNP 2,232 (H) 03/19/2025    TROPI 0.033 01/09/2017    AST 28 03/19/2025    ALT 20 03/19/2025    ALKPHOS 79 03/19/2025    BILITOTAL 0.4 03/19/2025    INR 1.83 (H) 03/19/2025     Diagnostics:   3/4/25 ICD check  Patient seen in [facility] [place] [facility] for evaluation and iterative programming of their ICD per MD orders.      Device: Medtronic [id number] [place] XT VR MRI  Normal device function.  Mode: VVI 40 bpm  : <0.1%  Intrinsic rhythm: VS 83 bpm  Thoracic Impedance:  Near reference line.   Short V-V intervals: 0  Lead Trends Appear Stable.  Estimated battery longevity to RRT = 14 years. Battery voltage = 3.09 V.   Atrial Arrhythmia: None  AF [place]: 0%  Anticoagulant: Warfarin  Ventricular Arrhythmia: None  Setting Changes: None  Patient has an appointment to see Dr. Aguiar today.   Plan: Device follow-up every 3 months.     Yumiko Antonio RN     Single lead ICD     3/4/25 [facility]    Systolic (mmHg) Diastolic (mmHg) Mean (mmHg) A Wave (mmHg) V Wave (mmHg) EDP (mmHg) Max dp/dt (mmHg/sec) HR (bpm) Content (mL/dL) SAT (%)     RA Pressures 12:07 PM   11    15    15      76        RV Pressures 11:53 AM       816          12:08 PM 42        11     71        PA Pressures 12:10 PM 42    23    31        67        PCW Pressures 12:09 PM   14    16    18      77        AO Pressures 11:56     64     78        91           Time Results Indexed Values (L/min/m2)   QP 11:53 AM 3.91 L/min    1.96      QS 11:53 AM 3.91 L/min    1.96          3/4/25 ECHO  Interpretation Summary  HM3 LVAD at 5100 RPM.  Left ventricular function is decreased. The ejection fraction is 10-20%  (severely reduced).  Moderate left ventricular dilation is present. LVIDd:6.4 cm  Global right ventricular function is moderately reduced.  Normal Doppler interrogation of the LVADoutflow graft.  s/p TV annuloplasty with 32 mm MC3 partial ring  The inferior vena cava was normal in size with preserved respiratory  variability.  No significant valvular abnormalities present.     This study was compared with the study from 9/5/2024 .  No significant changes noted.    9/5/24 ECHO  Interpretation Summary  The visual ejection fraction is <20%. Severe diffuse hypokinesis is present.  Dilation of the inferior vena cava is present with normal respiratory  variation in diameter. IVC diameter and respiratory changes fall into an  intermediate range suggesting an RA pressure of 8 mmHg.  No pericardial effusion is present.     This study was compared with the study from 12/27/23 .  No significant changes noted.    4/1/24 [facility]  RA: --/--/15 mm Hg  RV: 60/15 mm Hg  PA: 60/30 (43) mm Hg  PCWP: --/--/24 mm Hg  CO/CI: 3.6/1.8 (TD); 3.3/1.7 (Carlos)   PVR: 5.2 (TD) LING   Right sided filling pressures are moderately elevated. Left sided filling pressures are moderately elevated. Moderately elevated pulmonary artery hypertension. Reduced cardiac output level. Hemodynamic data has been modified in [facility] per physician review.        December 27, 2023 echo  Borderline-dilated LV with severely reduced global LV function, LVEF<20%.  LVIDd=5/8 cm.  RV function appears moderately reduced on limited views.  The aortic valve appears to remains closed on limited views. There is no AI.  LVAD inflow cannula is visualized in the LV apex. LVAD outflow graft is  visualized in the  aorta. Normal Doppler interrogation of the LVAD inflow  cannula and outflow graft.  This study was compared with the study from 5/3/23: No significant changes  noted.  ___________    8/7/23 RHC  RA 8  RV 39/8  PA 39/15/24  [place] 7  Cardiac output 4.3, cardiac index 2.3 by thermo  Cardiac output 3,   cardiac index 1.59 by Carlos  PVR: 2 Right sided filling pressures are normal. Left sided filling pressures are normal. Mild elevated pulmonary hypertension. Normal cardiac output level.     Pressures Phase: Baseline     Time Systolic (mmHg) Diastolic (mmHg) Mean (mmHg) A Wave (mmHg) V Wave (mmHg) EDP (mmHg) Max dp/dt (mmHg/sec) HR (bpm) Content (mL/dL) SAT (%)   RA Pressures 12:09 PM   8    9    13      75        RV Pressures 12:15 PM 39        9     74        PA Pressures 12:16 PM 39    15    24        74        PCW Pressures 12:15 PM   7        75        Art Pressures 11:59 AM 99    75    83        77          Blood Flow Results Phase: Baseline     Time Results Indexed Values (L/min/m2)   QP 11:55 AM 3.07 L/min    1.59      QS 11:55 AM 3.07 L/min    1.59        Blood Oximetry Phase: Baseline       Time Hb SAT(%) PO2 Content (mL/dL) PA Sat (%)   PA 11:55 AM  59 %      59      Art 11:55 AM  100 %     19.18              TTE 3/28/2023  Technically difficult study.Extremely poor acoustic windows.  Limited information was obtained during study.  LVAD cannula was seen in the expected anatomic position in the LV apex.  HM3 at 5200RPM.  Septum normal.  LVIDd 56mm.  Aortic valve not well visualized. No AI.  Normal outflow velocity.  Global right ventricular function is moderately to severely reduced.  Small pericardial effusion with organizing material in pericardial cavity.     TTE 5/3/23:  Please refer to the EPIC report for measurements performed at different LVAD speed settings.  HM3 at 5200RPM at baseline.  LVIDd 43mm.  Septum normal.  Aortic valve remain closed at baseline.     Assessment and Plan:   Akshat Fragoso is a 59  year old male with history of HFrEF 2/2 NICM (diagnosed in 1/2017) s/p ICD placement in 6/2017, VT/VF arrest on 3/15/2023 requiring CPR for 6 mins with cardiogenic shock s/p HM3 LVAD implantation as DT 3/23/2023 c/b postop AF (on amiodarone and digoxin) and HAP, moderate TR s/p TV annuloplasty with 32 mm MC3 partial ring 3/23/2023, PFO closure 3/23/2023, chronic tobacco/marijuana use, COPD, HTN, CKD stage III, who presents today for post LVAD implant follow up.    It seems his symptoms outlined above are all due to hypovolemia. Seem clearly associated with bumex taking. He is also hypertensive an needs more bp control. We will increase lisinopril. We will stop bumex and change to PRN lasix, but if he thinks he needs a lasix, he will let his vad coordinator know- eventually we will transition back to a more independent prn, but for now I think we should keep a bit of a cloer eye. When he takes lasix he will prefer to take it in the evening.    Of note- CI was 1.8 on last RHC (1.7 on the RHC before). Dr. Aguiar wanted him to work on hydrating more and then repeating RHC in 3 months- that is already scheduled.    # Chronic systolic heart failure secondary to NICM with cardiogenic shock s/p HM III on 3/23/23 LVAD. ACC/AHA Stage D, NYHA Class III  Moderate TR s/p TV annuloplasty with 32 mm MC3 partial ring 3/23/2023.  Has a history of NICM diagnosed in 1/2017 and underwent ICD for primary SCD prophylaxis in 4/2017. Patient had been undergoing workup at [place] for LVAD as destination therapy (initially evaluated for transplant though patient having trouble quitting marijuana/tobacco use). He had cardiac arrest in the setting of VT that was slower than his programmed VT threshold for intervention on 3/15/2023. Received CPR for 6 mins with ROSC and developed cardiogenic shock. During hospitalization, he underwent HM3 LVAD implantation on 3/23/2023 with postop course c/b AF with RVR requiring amiodarone gtt, volume overload,  and HAP treated with IV vancomycin and zosyn for 5 days. He was discharged to home on 4/9/2023.    Fluid status euvolemic to mild hypovolemia stop bumex. Start lasix 10 mg PRN as above, he will call us if he feels he needs it. Will plan to take it at nighttime per his preference  ACEi/ARB Increase lisinopril to 7.5 this week and then to 10 mg daily the week after if not having dizziness. We have discussed making the change to entresto many times, for now, we are just going to remain on lisinopril and take entresto off the med list to avoid confusion. We can continue to consider this in the future  BB could consider in the future, deferred given other upcoming changes as above  Aldosterone antagonist Continue Inspra 12.5 mg daily  SGLT2i: Continue Jardiance 10 mg dialy  SCD prophylaxis ICD  NSAID use: contraindicated  Sleep Apnea Evaluation: not discussed today  BP: MAP goal 65-85, above goal today, no pules  LDH trends: 243, stable  Anticoagulation: warfarin INR goal 2-3, today 1.83, dosing per [facility] clinic  Antiplatelet: Off ASA  Transplant considerations- had previously been limited by tobacco/marijuana, off tobacco and there have been programmatic changes around marijuana. Per Dr. Aguiar's last note, the pre-transplant coordinators have been updated about moving forward with eval.    VAD Interrogation on March 19, 2025 interrogation reviewed with VAD coordinator. Agree with findings. Occasional PI events. No power spikes, speed drops, or other findings suspicious of pump malfunction noted.       # Persistent lukocytosis.   ID noted no indication for long term antibiotics inpatient given no acute findings on CT while inpatient (he had been treated for HAP with IV antibiotics while inpatient). No signs or symptoms of infection today.  He did have a pre-op luekocytosis longstanding around 11-12,   - WBC at 11.9 at his baseline today    # Afib with RVR, resolved.   - Continue Amiodarone and Digoxin.  - Warfarin with INR  goal 2-3, dosing per [facility] clinic    # H/o VT/VF arrest  # H/o ICD shocks, inappropriate d/t RV lead oversensing. He did have  ICD removal and reimplant of medtronic system with lead revision.  - Follows with EP  - Continue amiodarone 100 mg daily, monitoring per EP  - ICD checks per protocol    # Moderate TR s/p TV annuloplasty with 32 mm MC3 partial ring 3/23/2023  PFO closure 3/23/2023  - Trend on ECHOs    # Chronic tobacco/marijuana use  - Has been off tobacco since Jan 2023  - Still using cannibis, but now with programmatic changes. Did not discuss today which form of cannibis he is using    Follow-up:  - RHC in May as scheduled  - VAD RABIA in June as scheduled     Billing  - I managed 2 stable chronic conditions  - I reviewed 4 tests  - I changed a prescription medicaiton    The longitudinal plan of care for the diagnosis(es)/condition(s) as documented were addressed during this visit. Due to the added complexity in care, I will continue to support Akshat in the subsequent management and with ongoing continuity of care.    Elba Chen PA-C  Advacned Heart Failure  [facility]

## 2025-03-18 NOTE — PROGRESS NOTES
Offered patient a newly open 0740 appt for tomorrow with LUIS Rae. Pt confirmed that he will accept this reschedule offer. Will update appointments and inform primary VAD coordinator, Rosita Roberson, of plan.

## 2025-03-19 ENCOUNTER — OFFICE VISIT (OUTPATIENT)
Dept: CARDIOLOGY | Facility: CLINIC | Age: 60
End: 2025-03-19
Attending: PHYSICIAN ASSISTANT
Payer: COMMERCIAL

## 2025-03-19 ENCOUNTER — LAB (OUTPATIENT)
Dept: LAB | Facility: CLINIC | Age: 60
End: 2025-03-19
Attending: PHYSICIAN ASSISTANT
Payer: COMMERCIAL

## 2025-03-19 ENCOUNTER — ANTICOAGULATION THERAPY VISIT (OUTPATIENT)
Dept: ANTICOAGULATION | Facility: CLINIC | Age: 60
End: 2025-03-19

## 2025-03-19 VITALS — BODY MASS INDEX: 29.29 KG/M2 | SYSTOLIC BLOOD PRESSURE: 92 MMHG | WEIGHT: 187 LBS | OXYGEN SATURATION: 94 %

## 2025-03-19 DIAGNOSIS — Z79.01 ANTICOAGULATED ON WARFARIN: ICD-10-CM

## 2025-03-19 DIAGNOSIS — I50.22 CHRONIC SYSTOLIC CONGESTIVE HEART FAILURE (H): ICD-10-CM

## 2025-03-19 DIAGNOSIS — I50.23 ACUTE ON CHRONIC SYSTOLIC CONGESTIVE HEART FAILURE (H): Primary | ICD-10-CM

## 2025-03-19 DIAGNOSIS — I50.22 CHRONIC SYSTOLIC CONGESTIVE HEART FAILURE (H): Primary | ICD-10-CM

## 2025-03-19 DIAGNOSIS — Z79.899 ENCOUNTER FOR MONITORING DIGOXIN THERAPY: ICD-10-CM

## 2025-03-19 DIAGNOSIS — Z95.811 LEFT VENTRICULAR ASSIST DEVICE PRESENT (H): ICD-10-CM

## 2025-03-19 DIAGNOSIS — Z79.01 ANTICOAGULATED ON COUMADIN: ICD-10-CM

## 2025-03-19 DIAGNOSIS — Z51.81 ENCOUNTER FOR MONITORING DIGOXIN THERAPY: ICD-10-CM

## 2025-03-19 DIAGNOSIS — Z79.899 LONG TERM USE OF DRUG: ICD-10-CM

## 2025-03-19 DIAGNOSIS — Z95.811 LVAD (LEFT VENTRICULAR ASSIST DEVICE) PRESENT (H): ICD-10-CM

## 2025-03-19 LAB
ALBUMIN SERPL BCG-MCNC: 4.5 G/DL (ref 3.5–5.2)
ALP SERPL-CCNC: 79 U/L (ref 40–150)
ALT SERPL W P-5'-P-CCNC: 20 U/L (ref 0–70)
ANION GAP SERPL CALCULATED.3IONS-SCNC: 13 MMOL/L (ref 7–15)
AST SERPL W P-5'-P-CCNC: 28 U/L (ref 0–45)
BILIRUB SERPL-MCNC: 0.4 MG/DL
BUN SERPL-MCNC: 20 MG/DL (ref 6–20)
CALCIUM SERPL-MCNC: 9.5 MG/DL (ref 8.8–10.4)
CHLORIDE SERPL-SCNC: 99 MMOL/L (ref 98–107)
CREAT SERPL-MCNC: 1.42 MG/DL (ref 0.67–1.17)
DIGOXIN SERPL-MCNC: 1.5 NG/ML (ref 0.6–1.2)
EGFRCR SERPLBLD CKD-EPI 2021: 58 ML/MIN/1.73M2
ERYTHROCYTE [DISTWIDTH] IN BLOOD BY AUTOMATED COUNT: 16.6 % (ref 10–15)
GLUCOSE SERPL-MCNC: 105 MG/DL (ref 70–99)
HCO3 SERPL-SCNC: 26 MMOL/L (ref 22–29)
HCT VFR BLD AUTO: 45.9 % (ref 40–53)
HGB BLD-MCNC: 14.3 G/DL (ref 13.3–17.7)
INR PPP: 1.83 (ref 0.85–1.15)
LDH SERPL L TO P-CCNC: 243 U/L (ref 0–250)
MCH RBC QN AUTO: 27.3 PG (ref 26.5–33)
MCHC RBC AUTO-ENTMCNC: 31.2 G/DL (ref 31.5–36.5)
MCV RBC AUTO: 88 FL (ref 78–100)
NT-PROBNP SERPL-MCNC: 2232 PG/ML (ref 0–900)
PLATELET # BLD AUTO: 232 10E3/UL (ref 150–450)
POTASSIUM SERPL-SCNC: 4.4 MMOL/L (ref 3.4–5.3)
PROT SERPL-MCNC: 7.3 G/DL (ref 6.4–8.3)
RBC # BLD AUTO: 5.24 10E6/UL (ref 4.4–5.9)
SODIUM SERPL-SCNC: 138 MMOL/L (ref 135–145)
WBC # BLD AUTO: 11.9 10E3/UL (ref 4–11)

## 2025-03-19 PROCEDURE — 99214 OFFICE O/P EST MOD 30 MIN: CPT | Mod: 25 | Performed by: PHYSICIAN ASSISTANT

## 2025-03-19 PROCEDURE — 83615 LACTATE (LD) (LDH) ENZYME: CPT | Performed by: PATHOLOGY

## 2025-03-19 PROCEDURE — 99211 OFF/OP EST MAY X REQ PHY/QHP: CPT | Performed by: PHYSICIAN ASSISTANT

## 2025-03-19 PROCEDURE — 93750 INTERROGATION VAD IN PERSON: CPT | Performed by: PHYSICIAN ASSISTANT

## 2025-03-19 PROCEDURE — 80053 COMPREHEN METABOLIC PANEL: CPT | Performed by: PATHOLOGY

## 2025-03-19 PROCEDURE — 80162 ASSAY OF DIGOXIN TOTAL: CPT | Performed by: PHYSICIAN ASSISTANT

## 2025-03-19 PROCEDURE — 36415 COLL VENOUS BLD VENIPUNCTURE: CPT | Performed by: PATHOLOGY

## 2025-03-19 PROCEDURE — 85610 PROTHROMBIN TIME: CPT | Performed by: PATHOLOGY

## 2025-03-19 PROCEDURE — 83880 ASSAY OF NATRIURETIC PEPTIDE: CPT | Performed by: PATHOLOGY

## 2025-03-19 PROCEDURE — 85027 COMPLETE CBC AUTOMATED: CPT | Performed by: PATHOLOGY

## 2025-03-19 PROCEDURE — 1126F AMNT PAIN NOTED NONE PRSNT: CPT | Performed by: PHYSICIAN ASSISTANT

## 2025-03-19 PROCEDURE — 99000 SPECIMEN HANDLING OFFICE-LAB: CPT | Performed by: PATHOLOGY

## 2025-03-19 RX ORDER — FUROSEMIDE 20 MG/1
10 TABLET ORAL PRN
Qty: 30 TABLET | Refills: 0 | Status: SHIPPED | OUTPATIENT
Start: 2025-03-19

## 2025-03-19 RX ORDER — LISINOPRIL 2.5 MG/1
7.5 TABLET ORAL DAILY
Qty: 112 TABLET | Refills: 0 | Status: SHIPPED | OUTPATIENT
Start: 2025-03-19

## 2025-03-19 ASSESSMENT — PAIN SCALES - GENERAL: PAINLEVEL_OUTOF10: NO PAIN (0)

## 2025-03-19 NOTE — NURSING NOTE
"Chief Complaint   Patient presents with    Follow Up     3 mo follow-up RV lead revision. Hx VT/VF arrest - on amio, NICM, LVAD.      BP Readings from Last 1 Encounters:   03/19/25 (!) 92/0      Pulse Readings from Last 1 Encounters:   03/04/25 91      Ht Readings from Last 2 Encounters:   03/04/25 1.702 m (5' 7\")   01/17/25 1.702 m (5' 7\")      Wt Readings from Last 2 Encounters:   03/19/25 84.8 kg (187 lb)   03/04/25 83.9 kg (185 lb)      Body mass index is 29.29 kg/m .    Vitals were taken, medications reconciled.     Eduar Brooks, Clinic Assistant    7:27 AM    "

## 2025-03-19 NOTE — PROGRESS NOTES
ANTICOAGULATION MANAGEMENT     Akshat Fragoso 59 year old male is on warfarin with subtherapeutic INR result. (Goal INR 2.0-3.0)    Recent labs: (last 7 days)     03/19/25  0716   INR 1.83*       ASSESSMENT     Source(s): Chart Review and Patient/Caregiver Call     Warfarin doses taken: Reviewed in chart  Diet: No new diet changes identified  Medication/supplement changes: Cards OV today:  Medication changes:  STOP Bumex  As needed, you can take Lasix 10mg, for weight gain or shortness of breath. Call the VAD team before you take this.   INCREASE Lisinopril to 7.5mg daily. If you tolerate this dose and feel well, starting 3/24/25 increase dose to 10mg daily    New illness, injury, or hospitalization: No  Signs or symptoms of bleeding or clotting: per Cards OV note: No blood in the urine or blood in the stool. No prolonged nosebleeds.   Previous result: Therapeutic last 2(+) visits (low end of goal range, now subtherapeutic)  Additional findings: None       PLAN     Recommended plan for no diet, medication or health factor changes affecting INR     Dosing Instructions: Increase your warfarin dose (9.1% change) with next INR in 1 week       Summary  As of 3/19/2025      Full warfarin instructions:  2.5 mg every Tue, Fri; 5 mg all other days   Next INR check:  3/25/2025               Detailed voice message left for Akshat with dosing instructions and follow up date.   Sent Leads Direct message with dosing and follow up instructions    Patient to recheck with home meter    Education provided: Please call back if any changes to your diet, medications or how you've been taking warfarin  Goal range and lab monitoring: goal range and significance of current result and Importance of following up at instructed interval  Contact [phone] with any changes, questions or concerns.     Plan made per [facility] anticoagulation protocol and per LVAD protocol    TREV LYN, RN  3/19/2025  Anticoagulation Clinic  [facility] for routing  messages: earnest ANTICOAG LVAD  [facility] patient phone line: [phone]        _______________________________________________________________________     Anticoagulation Episode Summary       Current INR goal:  2.0-3.0   TTR:  91.4% (11.8 mo)   Target end date:  Indefinite   Send INR reminders to:  ANTICOAG LVAD    Indications    Acute on chronic systolic congestive heart failure (H) [I50.23]  LVAD (left ventricular assist device) present (H) [Z95.811]  Chronic systolic congestive heart failure (H) [I50.22]  Long term use of drug [Z79.899]  Left ventricular assist device present (H) [Z95.811]  Anticoagulated on Coumadin [Z79.01]             Comments:  Follow VAD Anticoag protocol:Yes: HeartMate 3   Bridging: Enoxaparin   Date VAD placed: 3/23/23  [employer] home monitor             Anticoagulation Care Providers       Provider Role Specialty Phone number    Kenzie Moreau MD Referring Advanced Heart Failure and Transplant Cardiology [phone]    Mile Bolivar, VERONICA CNP Referring Nurse Practitioner [phone]    Shaun Aguiar MD Referring Cardiovascular Disease [phone]

## 2025-03-19 NOTE — PATIENT INSTRUCTIONS
" Ventricular Assist Device Clinic  Take your medications every day, as directed!  Medication changes made today:  STOP Bumex  As needed, you can take Lasix 10mg, for weight gain or shortness of breath. Call the VAD team before you take this.   INCREASE Lisinopril to 7.5mg daily. If you tolerate this dose and feel well, starting 3/24/25 increase dose to 10mg daily   Instructions:  Stay hydrated Monitor your heart failure, Page the VAD Coordinator on call:  If you gain more than 3 lb in a day or 5 in a week  If you feel worsening shortness of breath, palpitations, or swelling.   If you have VAD alarms or change in parameters  If you feel dizzy or lightheaded     Keep your VAD appointments!    Your next appointment is a right heart cath with Dr. Aguiar on 5/16.   6/11 a clinic visit with Crissy.       Please see the clinic schedulers after your appointment to schedule follow-up.    If you do not have an appointment scheduled, you need to call the VAD  at [phone]. To Page the VAD Coordinator on call, dial [phone] option #4 and ask to speak to the VAD coordinator on call. Try to maintain a heart healthy lifestyle!  Limit salt & sodium to 2000mg/day   Eat a heart healthy diet, low in saturated fats  Stay active! Aim to move at least 150 minutes every week.    Use [employer] allows you to communicate directly with your heart team through secure messaging.  [employer] can be accessed any time on your phone, computer, or tablet.  If you need assistance, we'd be happy to help!      Equipment Reminders:   Charge your back-up controller at least every 6 months. To charge, connect it to either batteries or wall power. The screen will read \"Charging\". Keep connected until the screen reads \"charging complete\". Disconnect power once the controller battery is charged. Also do a self-test when the controller is connected to power.  Replace the AA batteries in your mobile power unit every 6 months.  Check your " battery charger for when it is due for maintenance. It requires inspection in clinic once per year. There will be a sticker stating the month and year maintenance is due. When you bring your battery charger to clinic, tell one of the schedulers you have LVAD equipment that needs maintenance. They will call [facility]. You can leave your  under the LVAD sign by the  at the far end of clinic. You must drop it off before 2pm.

## 2025-03-19 NOTE — LETTER
3/19/2025      RE: Akshat Fragoso  [address]  [facility] [address]       Dear Colleague,    Thank you for the opportunity to participate in the care of your patient, Akshat Fragoso, at the [facility] [place] [facility]. Please see a copy of my visit note below.    LVAD Clinic  RABIA follow-up     Akshat Fragoso is a 59 year old male with history of HFrEF 2/2 NICM (diagnosed in 1/2017) s/p ICD placement in 6/2017, VT/VF arrest on 3/15/2023 requiring CPR for 6 mins with cardiogenic shock s/p HM3 LVAD implantation as DT 3/23/2023 c/b postop AF (on amiodarone and digoxin) and HAP, moderate TR s/p TV annuloplasty with 32 mm MC3 partial ring 3/23/2023, PFO closure 3/23/2023, chronic tobacco/marijuana use, COPD, HTN, CKD stage III, who presents today for post LVAD implant follow up.     He was admitted on 3/15/2023 for cardiac arrest in the setting of VT that was slower than his programmed VT threshold for intervention. Patient had been undergoing workup at [facility] for LVAD as destination therapy (initially evaluated for transplant though patient having trouble quitting marijuana/tobacco use). He underwent HM3 LVAD implantation on 3/23/2023 with postop course c/b AF with RVR requiring amiodarone gtt, volume overload, and HAP treated with IV vancomycin and zosyn for 5 days. He was discharged to home on 4/9/2023.     He was admitted from 9/3/24-9/6/24 for an inappropriate ICD shock (he had an RV lead dysfunction with oversensince (microperforation). He underwent lead and battery exchange of his device on 9/5/24. He did get a lisinopril washout and was stared on entresto. Otherwise no significant cardiology medication changes.    Today  HE is seeing me today because he was having a lightheadedness. This was associated with flows down to 3.5 (from about 4)) and Pis of to 5-6, usually runs in the 3. This seems to have cooralated with taking a 0.5 mg of bumex  a few days. When he was lightheaded his vision felt more blurry, but no other neuro symptoms. MAPs were also elevated about 80-90. Symptoms have been resolved for the past 2 days. No LE edema or abdominal edema. He takes bumex when his weight is up or when he has a nocturnal cough.     No blood in the urine or blood in the stool. No prolonged nosebleeds.      No driveline concerns. No fever or chills.     Current cardiac medications   - Lisinopril 5 mg daily  - Inspra 12.5 m gdaily  - Empagliflozin 10 mg daily  - Digoxin 125 mcg daily  - Amiodarone 100 mg daily  - Warfarin (INR goal 2-3)      PAST MEDICAL HISTORY:  Past Medical History:   Diagnosis Date     Acute right lumbar radiculopathy 11/02/2015     Acute systolic heart failure (H) 01/10/2017     Cardiomyopathy (H) 01/10/2017     CKD (chronic kidney disease) stage 3, GFR 30-59 ml/min (H) 01/30/2020     JOHNSON (dyspnea on exertion)      ED (erectile dysfunction) 10/02/2019     Erectile dysfunction      ICD (implantable cardioverter-defibrillator) in place- Eversight, single chamber- NOT dependent 10/09/2017     Personal history of smoking 01/04/2017     Pulmonary nodules 01/17/2017    CT 11/2018:  Bilateral pulmonary nodules measuring up to 4 mm. No new or enlarging pulmonary nodules.     FAMILY HISTORY:  Family History   Problem Relation Age of Onset     Parkinsonism Mother      Atrial fibrillation Father      Heart Failure Father      Prostate Cancer Father      Skin Cancer Father      Anorexia nervosa Daughter      Other - See Comments Granddaughter         premature birth     SOCIAL HISTORY:  Social History     Socioeconomic History     Marital status:      Spouse name: Cheryl     Number of children: 2   Occupational History     Occupation: Construction      Employer: SELF   Tobacco Use     Smoking status: Former     Packs/day: 0.25     Years: 15.00     Pack years: 3.75     Types: Cigarettes     Smokeless tobacco: Former     Quit date:  3/15/2023   Vaping Use     Vaping status: Never Used   Substance and Sexual Activity     Alcohol use: No     Alcohol/week: 0.0 standard drinks of alcohol     Drug use: No     Sexual activity: Yes     Partners: Female     Birth control/protection: Condom   Other Topics Concern     Parent/sibling w/ CABG, MI or angioplasty before 65F 55M? Yes     Comment: both parents had stints placed      CURRENT MEDICATIONS:  Current Outpatient Medications   Medication Sig Dispense Refill     acetaminophen (TYLENOL) 325 MG tablet Take 2 tablets (650 mg) by mouth every 4 hours as needed for other (For optimal non-opioid multimodal pain management to improve pain control.) 100 tablet 0     amiodarone (PACERONE) 200 MG tablet Take 0.5 tablets (100 mg) by mouth daily. 45 tablet 3     amoxicillin (AMOXIL) 500 MG tablet Take 4 tablets (2,000 mg) by mouth as needed (Take one hour before dental procedure.) 4 tablet 2     digoxin (LANOXIN) 125 MCG tablet Take 1 tablet (125 mcg) by mouth daily. 90 tablet 3     empagliflozin (JARDIANCE) 10 MG TABS tablet Take 1 tablet (10 mg) by mouth daily. 90 tablet 3     eplerenone (INSPRA) 25 MG tablet Take 12.5mg (1/2 tab) daily 45 tablet 3     furosemide (LASIX) 20 MG tablet Take 0.5 tablets (10 mg) by mouth as needed (weight gain or shortness of breath). Please call the VAD team before taking this medication 30 tablet 0     JANTOVEN ANTICOAGULANT 5 MG tablet Take 0.5 to 1 tablet by mouth every day OR as directed by Anticoagulation Clinic 90 tablet 1     lisinopril (ZESTRIL) 2.5 MG tablet Take 3 tablets (7.5 mg) by mouth daily. Through this week and the weekend. If you are feeling well on this dose, starting 3/24/25, increase to 10mg by mouth daily. 112 tablet 0     pantoprazole (PROTONIX) 20 MG EC tablet Take 1 tablet (20 mg) by mouth every morning (before breakfast). 90 tablet 3     sildenafil (VIAGRA) 50 MG tablet Take 1 tablet (50 mg) by mouth daily as needed (PRN for sexual activity) 30 tablet 0      No current facility-administered medications for this visit.     ROS:   See HPI     EXAM:  BP (!) 92/0 (BP Location: Right arm, Patient Position: Chair, Cuff Size: Adult Regular)   Wt 84.8 kg (187 lb)   SpO2 94%   BMI 29.29 kg/m    GENERAL: Appears comfortable, in no acute distress.   HEENT: Eye symmetrical  CV: Hum of Hm3, no adventitious sounds. JVP <6.   RESPIRATORY: Respirations regular, even, and unlabored. Lungs CTA throughout.   GI: Soft  and non distended.   EXTREMITIES:No peripheral edema. All extremities are warm and well perfused  NEUROLOGIC: Alert and interacting appropriately. No focal deficits.   SKIN: No jaundice.  Driveline site c/d/i.       Labs:  Lab Results   Component Value Date    WBC 11.9 (H) 03/19/2025    HGB 14.3 03/19/2025    HCT 45.9 03/19/2025     03/19/2025     03/19/2025    POTASSIUM 4.4 03/19/2025    CHLORIDE 99 03/19/2025    CO2 26 03/19/2025    BUN 20.0 03/19/2025    CR 1.42 (H) 03/19/2025     (H) 03/19/2025    DD 3.60 (H) 04/18/2023    NTBNPI 2,071 (H) 09/03/2024    NTBNP 2,232 (H) 03/19/2025    TROPI 0.033 01/09/2017    AST 28 03/19/2025    ALT 20 03/19/2025    ALKPHOS 79 03/19/2025    BILITOTAL 0.4 03/19/2025    INR 1.83 (H) 03/19/2025     Diagnostics:   3/4/25 ICD check  [place] [facility] [place] of their ICD per MD orders.      Device: Torrential [id number] [place] XT VR MRI  Normal device function.  Mode: VVI 40 bpm  : <0.1%  Intrinsic rhythm: VS 83 bpm  Thoracic Impedance:  Near reference line.   Short V-V intervals: 0  Lead Trends Appear Stable.  Estimated battery longevity to RRT = 14 years. Battery voltage = 3.09 V.   Atrial Arrhythmia: None  AF [place]: 0%  Anticoagulant: Warfarin  Ventricular Arrhythmia: None  Setting Changes: None  Patient has an appointment to see Dr. Cosme today.   Plan: Device follow-up every 3 months.     Yumiko Antonio RN     Single lead ICD     3/4/25 RHC    Systolic (mmHg) Diastolic (mmHg)  Mean (mmHg) A Wave (mmHg) V Wave (mmHg) EDP (mmHg) Max dp/dt (mmHg/sec) HR (bpm) Content (mL/dL) SAT (%)     RA Pressures 12:07 PM   11    15    15      76        RV Pressures 11:53 AM       816          12:08 PM 42        11     71        PA Pressures 12:10 PM 42    23    31        67        PCW Pressures 12:09 PM   14    16    18      77        AO Pressures 11:56     64    78        91           Time Results Indexed Values (L/min/m2)   QP 11:53 AM 3.91 L/min    1.96      QS 11:53 AM 3.91 L/min    1.96          3/4/25 ECHO  Interpretation Summary  HM3 LVAD at 5100 RPM.  Left ventricular function is decreased. The ejection fraction is 10-20%  (severely reduced).  Moderate left ventricular dilation is present. LVIDd:6.4 cm  Global right ventricular function is moderately reduced.  Normal Doppler interrogation of the LVADoutflow graft.  s/p TV annuloplasty with 32 mm MC3 partial ring  The inferior vena cava was normal in size with preserved respiratory  variability.  No significant valvular abnormalities present.     This study was compared with the study from 9/5/2024 .  No significant changes noted.    9/5/24 ECHO  Interpretation Summary  The visual ejection fraction is <20%. Severe diffuse hypokinesis is present.  Dilation of the inferior vena cava is present with normal respiratory  variation in diameter. IVC diameter and respiratory changes fall into an  intermediate range suggesting an RA pressure of 8 mmHg.  No pericardial effusion is present.     This study was compared with the study from 12/27/23 .  No significant changes noted.    4/1/24 RHC  RA: --/--/15 mm Hg  RV: 60/15 mm Hg  PA: 60/30 (43) mm Hg  PCWP: --/--/24 mm Hg  CO/CI: 3.6/1.8 (TD); 3.3/1.7 (Carlos)   PVR: 5.2 (TD) LING   Right sided filling pressures are moderately elevated. Left sided filling pressures are moderately elevated. Moderately elevated pulmonary artery hypertension. Reduced cardiac output level. Hemodynamic data has been modified in  Epic per physician review.        December 27, 2023 echo  Borderline-dilated LV with severely reduced global LV function, LVEF<20%.  LVIDd=5/8 cm.  RV function appears moderately reduced on limited views.  The aortic valve appears to remains closed on limited views. There is no AI.  LVAD inflow cannula is visualized in the LV apex. LVAD outflow graft is  visualized in the aorta. Normal Doppler interrogation of the LVAD inflow  cannula and outflow graft.  This study was compared with the study from 5/3/23: No significant changes  noted.  ___________    8/7/23 RHC  RA 8  RV 39/8  PA 39/15/24  [place]edge 7  Cardiac output 4.3, cardiac index 2.3 by thermo  Cardiac output 3,   cardiac index 1.59 by Carlos  PVR: 2 Right sided filling pressures are normal. Left sided filling pressures are normal. Mild elevated pulmonary hypertension. Normal cardiac output level.     Pressures Phase: Baseline     Time Systolic (mmHg) Diastolic (mmHg) Mean (mmHg) A Wave (mmHg) V Wave (mmHg) EDP (mmHg) Max dp/dt (mmHg/sec) HR (bpm) Content (mL/dL) SAT (%)   RA Pressures 12:09 PM   8    9    13      75        RV Pressures 12:15 PM 39        9     74        PA Pressures 12:16 PM 39    15    24        74        PCW Pressures 12:15 PM   7        75        Art Pressures 11:59 AM 99    75    83        77          Blood Flow Results Phase: Baseline     Time Results Indexed Values (L/min/m2)   QP 11:55 AM 3.07 L/min    1.59      QS 11:55 AM 3.07 L/min    1.59        Blood Oximetry Phase: Baseline       Time Hb SAT(%) PO2 Content (mL/dL) PA Sat (%)   PA 11:55 AM  59 %      59      Art 11:55 AM  100 %     19.18              TTE 3/28/2023  Technically difficult study.Extremely poor acoustic windows.  Limited information was obtained during study.  LVAD cannula was seen in the expected anatomic position in the LV apex.  HM3 at 5200RPM.  Septum normal.  LVIDd 56mm.  Aortic valve not well visualized. No AI.  Normal outflow velocity.  Global right ventricular  function is moderately to severely reduced.  Small pericardial effusion with organizing material in pericardial cavity.     TTE 5/3/23:  Please refer to the EPIC report for measurements performed at different LVAD speed settings.  HM3 at 5200RPM at baseline.  LVIDd 43mm.  Septum normal.  Aortic valve remain closed at baseline.     Assessment and Plan:   Akshat Fragoso is a 59 year old male with history of HFrEF 2/2 NICM (diagnosed in 1/2017) s/p ICD placement in 6/2017, VT/VF arrest on 3/15/2023 requiring CPR for 6 mins with cardiogenic shock s/p HM3 LVAD implantation as DT 3/23/2023 c/b postop AF (on amiodarone and digoxin) and HAP, moderate TR s/p TV annuloplasty with 32 mm MC3 partial ring 3/23/2023, PFO closure 3/23/2023, chronic tobacco/marijuana use, COPD, HTN, CKD stage III, who presents today for post LVAD implant follow up.    It seems his symptoms outlined above are all due to hypovolemia. Seem clearly associated with bumex taking. He is also hypertensive an needs more bp control. We will increase lisinopril. We will stop bumex and change to PRN lasix, but if he thinks he needs a lasix, he will let his vad coordinator know- eventually we will transition back to a more independent prn, but for now I think we should keep a bit of a cloer eye. When he takes lasix he will prefer to take it in the evening.    Of note- CI was 1.8 on last RHC (1.7 on the RHC before). Dr. Aguiar wanted him to work on hydrating more and then repeating RHC in 3 months- that is already scheduled.    # Chronic systolic heart failure secondary to NICM with cardiogenic shock s/p HM III on 3/23/23 LVAD. ACC/AHA Stage D, NYHA Class III  Moderate TR s/p TV annuloplasty with 32 mm MC3 partial ring 3/23/2023.  Has a history of NICM diagnosed in 1/2017 and underwent ICD for primary SCD prophylaxis in 4/2017. Patient had been undergoing workup at [facility] for LVAD as destination therapy (initially evaluated for transplant though patient having  trouble quitting marijuana/tobacco use). He had cardiac arrest in the setting of VT that was slower than his programmed VT threshold for intervention on 3/15/2023. Received CPR for 6 mins with ROSC and developed cardiogenic shock. During hospitalization, he underwent HM3 LVAD implantation on 3/23/2023 with postop course c/b AF with RVR requiring amiodarone gtt, volume overload, and HAP treated with IV vancomycin and zosyn for 5 days. He was discharged to home on 4/9/2023.    Fluid status euvolemic to mild hypovolemia stop bumex. Start lasix 10 mg PRN as above, he will call us if he feels he needs it. Will plan to take it at nighttime per his preference  ACEi/ARB Increase lisinopril to 7.5 this week and then to 10 mg daily the week after if not having dizziness. We have discussed making the change to entresto many times, for now, we are just going to remain on lisinopril and take entresto off the med list to avoid confusion. We can continue to consider this in the future  BB could consider in the future, deferred given other upcoming changes as above  Aldosterone antagonist Continue Inspra 12.5 mg daily  SGLT2i: Continue Jardiance 10 mg dialy  SCD prophylaxis ICD  NSAID use: contraindicated  Sleep Apnea Evaluation: not discussed today  BP: MAP goal 65-85, above goal today, no pules  LDH trends: 243, stable  Anticoagulation: warfarin INR goal 2-3, today 1.83, dosing per [facility] clinic  Antiplatelet: Off ASA  Transplant considerations- had previously been limited by tobacco/marijuana, off tobacco and there have been programmatic changes around marijuana. Per Dr. Aguiar's last note, the pre-transplant coordinators have been updated about moving forward with eval.    VAD Interrogation on March 19, 2025 interrogation reviewed with VAD coordinator. Agree with findings. Occasional PI events. No power spikes, speed drops, or other findings suspicious of pump malfunction noted.       # Persistent lukocytosis.   ID noted no  indication for long term antibiotics inpatient given no acute findings on CT while inpatient (he had been treated for HAP with IV antibiotics while inpatient). No signs or symptoms of infection today.  He did have a pre-op luekocytosis longstanding around 11-12,   - WBC at 11.9 at his baseline today    # Afib with RVR, resolved.   - Continue Amiodarone and Digoxin.  - Warfarin with INR goal 2-3, dosing per [facility] clinic    # H/o VT/VF arrest  # H/o ICD shocks, inappropriate d/t RV lead oversensing. He did have  ICD removal and reimplant of medtronic system with lead revision.  - Follows with EP  - Continue amiodarone 100 mg daily, monitoring per EP  - ICD checks per protocol    # Moderate TR s/p TV annuloplasty with 32 mm MC3 partial ring 3/23/2023  PFO closure 3/23/2023  - Trend on ECHOs    # Chronic tobacco/marijuana use  - Has been off tobacco since Jan 2023  - Still using cannibis, but now with programmatic changes. Did not discuss today which form of cannibis he is using    Follow-up:  - RHC in May as scheduled  - VAD RABIA in June as scheduled     Billing  - I managed 2 stable chronic conditions  - I reviewed 4 tests  - I changed a prescription medicaiton    The longitudinal plan of care for the diagnosis(es)/condition(s) as documented were addressed during this visit. Due to the added complexity in care, I will continue to support Akshat in the subsequent management and with ongoing continuity of care.    Elba Chen PA-C  Advacned Heart Failure  [facility]         Please do not hesitate to contact me if you have any questions/concerns.     Sincerely,     Elba Chen PA-C

## 2025-03-19 NOTE — PROGRESS NOTES
[facility]    Just wanted to let you know that I did change my Digoxin dosage on March 4th.     I'll go ahead and adjust based on your recommendation, but just wanted to keep you in the loop on that. I'm also going to be changing my Lisinopral dosage starting tomorrow, per my visit today with my cardio team.     TREV LYN RN  Anticoagulation Clinic  [phone]

## 2025-03-19 NOTE — NURSING NOTE
MCS VAD Pump Info       Row Name 03/19/25 0800             MCS VAD Information    Implant --      LVAD Pump --         Heartmate 3 LEFT VS    Flow (Lpm) 4.1 Lpm      Pulse Index (PI) 4.6 PI      Speed (rpm) 5100 rpm      Power (mari) 3.6 mari      Current Hct setting 45.9         Primary Controller    Serial number [id number]      Low flow alarm setting --      High watt alarm setting --      EBB: Patient use 4      Replace in 26 Months         Backup Controller    Serial number [id number]      EBB: Patient use 4      Replace EBB in 7 Months      Speed & HCT match primary controller --         VAD Interrogation    Alarms reported by patient N      Unexpected alarms noted upon interrogation None      PI events Occasional  PI 2.1-5.5, rare speed drops, hx of 1 week      Damage to equipment is noted N      Action taken Reviewed proper equipment care and maintenance         Driveline Exit Site    Dressing change done N      Driveline properly secured Yes      DLES assessment c/d/i per pt report      Dressing used Daily kit      Frequency patient changes dressing --      Dressing modifications --      Dressing change supplier --                      Education Complete: Yes   Charge the BACKUP controller s backup battery every 6 months  Perform a self test on BACKUP every 6 months  Change the MPU s batteries every 6 months:Yes  Have equipment serviced yearly (if applicable):Yes    Reviewed laminated flashcard to be kept in back-up bag. Reviewed equipment names and functions.

## 2025-03-20 DIAGNOSIS — Z79.899 ENCOUNTER FOR MONITORING DIGOXIN THERAPY: Primary | ICD-10-CM

## 2025-03-20 DIAGNOSIS — Z51.81 ENCOUNTER FOR MONITORING DIGOXIN THERAPY: Primary | ICD-10-CM

## 2025-03-20 DIAGNOSIS — Z95.811 LVAD (LEFT VENTRICULAR ASSIST DEVICE) PRESENT (H): ICD-10-CM

## 2025-03-20 RX ORDER — DIGOXIN 125 MCG
TABLET ORAL
Qty: 90 TABLET | Refills: 3 | Status: SHIPPED | OUTPATIENT
Start: 2025-03-20

## 2025-03-24 ENCOUNTER — ANTICOAGULATION THERAPY VISIT (OUTPATIENT)
Dept: ANTICOAGULATION | Facility: CLINIC | Age: 60
End: 2025-03-24
Payer: COMMERCIAL

## 2025-03-24 DIAGNOSIS — I50.22 CHRONIC SYSTOLIC CONGESTIVE HEART FAILURE (H): ICD-10-CM

## 2025-03-24 DIAGNOSIS — Z79.899 LONG TERM USE OF DRUG: ICD-10-CM

## 2025-03-24 DIAGNOSIS — Z95.811 LEFT VENTRICULAR ASSIST DEVICE PRESENT (H): ICD-10-CM

## 2025-03-24 DIAGNOSIS — I50.23 ACUTE ON CHRONIC SYSTOLIC CONGESTIVE HEART FAILURE (H): Primary | ICD-10-CM

## 2025-03-24 DIAGNOSIS — Z79.01 ANTICOAGULATED ON COUMADIN: ICD-10-CM

## 2025-03-24 DIAGNOSIS — Z95.811 LVAD (LEFT VENTRICULAR ASSIST DEVICE) PRESENT (H): ICD-10-CM

## 2025-03-24 LAB — INR HOME MONITORING: 2.4 (ref 2–3)

## 2025-03-24 NOTE — PROGRESS NOTES
ANTICOAGULATION MANAGEMENT     Akshat Fragoso 59 year old male is on warfarin with therapeutic INR result. (Goal INR 2.0-3.0)    Recent labs: (last 7 days)     03/24/25  0000   INR 2.4       ASSESSMENT     Source(s): Chart Review  Previous INR was Subtherapeutic  Medication, diet, health changes since last INR chart reviewed; none identified         PLAN     Recommended plan for no diet, medication or health factor changes affecting INR     Dosing Instructions: Continue your current warfarin dose with next INR in 1 week       Summary  As of 3/24/2025      Full warfarin instructions:  2.5 mg every Tue, Fri; 5 mg all other days   Next INR check:  3/31/2025               Detailed voice message left for Akshat with dosing instructions and follow up date.     Patient to recheck with home meter    Education provided: Please call back if any changes to your diet, medications or how you've been taking warfarin    Plan made per [facility] anticoagulation protocol and per LVAD protocol    Denisse Pillai RN  3/24/2025  Anticoagulation Clinic  [facility] for routing messages: p ANTICOAG LVAD  [facility] patient phone line: [phone]        _______________________________________________________________________     Anticoagulation Episode Summary       Current INR goal:  2.0-3.0   TTR:  91.0% (11.8 mo)   Target end date:  Indefinite   Send INR reminders to:  ANTICOAG LVAD    Indications    Acute on chronic systolic congestive heart failure (H) [I50.23]  LVAD (left ventricular assist device) present (H) [Z95.811]  Chronic systolic congestive heart failure (H) [I50.22]  Long term use of drug [Z79.899]  Left ventricular assist device present (H) [Z95.811]  Anticoagulated on Coumadin [Z79.01]             Comments:  Follow VAD Anticoag protocol:Yes: HeartMate 3   Bridging: Enoxaparin   Date VAD placed: 3/23/23  [employer] home monitor             Anticoagulation Care Providers       Provider Role Specialty Phone number    Kenzie Moreau MD  Referring Advanced Heart Failure and Transplant Cardiology [phone]    Mile Bolivar, VERONICA CNP Referring Nurse Practitioner [phone]    Shaun Aguiar MD Referring Cardiovascular Disease [phone]

## 2025-03-25 ENCOUNTER — MYC MEDICAL ADVICE (OUTPATIENT)
Dept: OTHER | Age: 60
End: 2025-03-25

## 2025-03-27 ENCOUNTER — CARE COORDINATION (OUTPATIENT)
Dept: CARDIOLOGY | Facility: CLINIC | Age: 60
End: 2025-03-27
Payer: COMMERCIAL

## 2025-03-27 NOTE — PROGRESS NOTES
Patient paged this writer as the VAD coordinator on call with c/o uncontrollable tooth pain. Patient wondering if he should take amoxicillin that he's got on hand in preparation for a dental procedure next week. Informed patient that our team cannot advise him on dental antibiotics other than as prophylaxis for dental work. Recommended that patient contact his dental office for further recommendations, or report to the urgent care or emergency department if patient's pain remains uncontrollable/worsens, or if patient develops s/s of systemic infection. Encouraged patient to page again with any additional VAD-related questions or concerns.

## 2025-03-31 ENCOUNTER — ANTICOAGULATION THERAPY VISIT (OUTPATIENT)
Dept: ANTICOAGULATION | Facility: CLINIC | Age: 60
End: 2025-03-31
Payer: COMMERCIAL

## 2025-03-31 DIAGNOSIS — Z79.01 ANTICOAGULATED ON COUMADIN: ICD-10-CM

## 2025-03-31 DIAGNOSIS — Z79.899 LONG TERM USE OF DRUG: ICD-10-CM

## 2025-03-31 DIAGNOSIS — I50.23 ACUTE ON CHRONIC SYSTOLIC CONGESTIVE HEART FAILURE (H): Primary | ICD-10-CM

## 2025-03-31 DIAGNOSIS — Z95.811 LVAD (LEFT VENTRICULAR ASSIST DEVICE) PRESENT (H): ICD-10-CM

## 2025-03-31 DIAGNOSIS — I50.22 CHRONIC SYSTOLIC CONGESTIVE HEART FAILURE (H): ICD-10-CM

## 2025-03-31 DIAGNOSIS — Z95.811 LEFT VENTRICULAR ASSIST DEVICE PRESENT (H): ICD-10-CM

## 2025-03-31 LAB — INR HOME MONITORING: 2.5 (ref 2–3)

## 2025-03-31 NOTE — TELEPHONE ENCOUNTER
3/28 1500    Called pt to discuss concerns regarding primary controller.  Pt unable to reproduce or display situation.  Instructed pt to take pictures and collect more details if this happens again.    Pt verbalized understanding of the instructions given.  Will call VAD coordinator with further needs and questions.

## 2025-03-31 NOTE — PROGRESS NOTES
ANTICOAGULATION MANAGEMENT     Akshat Fragoso 59 year old male is on warfarin with therapeutic INR result. (Goal INR 2.0-3.0)    Recent labs: (last 7 days)     03/31/25  0000   INR 2.5       ASSESSMENT     Source(s): Chart Review  Previous INR was Therapeutic last visit; previously outside of goal range  Medication, diet, health changes since last INR chart reviewed; none identified         PLAN     Recommended plan for no diet, medication or health factor changes affecting INR     Dosing Instructions: Continue your current warfarin dose with next INR in 1 week       Summary  As of 3/31/2025      Full warfarin instructions:  2.5 mg every Tue, Fri; 5 mg all other days   Next INR check:  4/7/2025               Detailed voice message left for Akshat with dosing instructions and follow up date.   Sent [employer] message with dosing and follow up instructions    Patient to recheck with home meter    Education provided: Please call back if any changes to your diet, medications or how you've been taking warfarin  Contact [phone] with any changes, questions or concerns.     Plan made per [facility] anticoagulation protocol and per LVAD protocol    Addie Will RN  3/31/2025  Anticoagulation Clinic  [facility] for routing messages: earnest ANTICOAG LVAD  [facility] patient phone line: [phone]        _______________________________________________________________________     Anticoagulation Episode Summary       Current INR goal:  2.0-3.0   TTR:  91.0% (11.8 mo)   Target end date:  Indefinite   Send INR reminders to:  ANTICOAG LVAD    Indications    Acute on chronic systolic congestive heart failure (H) [I50.23]  LVAD (left ventricular assist device) present (H) [Z95.811]  Chronic systolic congestive heart failure (H) [I50.22]  Long term use of drug [Z79.899]  Left ventricular assist device present (H) [Z95.811]  Anticoagulated on Coumadin [Z79.01]             Comments:  Follow VAD Anticoag protocol:Yes: HeartMate 3   Bridging:  Enoxaparin   Date VAD placed: 3/23/23  [employer] home monitor             Anticoagulation Care Providers       Provider Role Specialty Phone number    Kenzie Moreau MD Referring Advanced Heart Failure and Transplant Cardiology [phone]    Mile Bolivar, VERONICA CNP Referring Nurse Practitioner [phone]    Shaun Aguiar MD Referring Cardiovascular Disease [phone]

## 2025-04-08 ENCOUNTER — LAB (OUTPATIENT)
Dept: LAB | Facility: CLINIC | Age: 60
End: 2025-04-08
Payer: COMMERCIAL

## 2025-04-08 ENCOUNTER — ANTICOAGULATION THERAPY VISIT (OUTPATIENT)
Dept: ANTICOAGULATION | Facility: CLINIC | Age: 60
End: 2025-04-08

## 2025-04-08 ENCOUNTER — CARE COORDINATION (OUTPATIENT)
Dept: CARDIOLOGY | Facility: CLINIC | Age: 60
End: 2025-04-08

## 2025-04-08 DIAGNOSIS — Z51.81 ENCOUNTER FOR MONITORING DIGOXIN THERAPY: ICD-10-CM

## 2025-04-08 DIAGNOSIS — Z79.01 ANTICOAGULATED ON COUMADIN: ICD-10-CM

## 2025-04-08 DIAGNOSIS — Z95.811 LVAD (LEFT VENTRICULAR ASSIST DEVICE) PRESENT (H): ICD-10-CM

## 2025-04-08 DIAGNOSIS — I50.22 CHRONIC SYSTOLIC CONGESTIVE HEART FAILURE (H): ICD-10-CM

## 2025-04-08 DIAGNOSIS — I50.23 ACUTE ON CHRONIC SYSTOLIC CONGESTIVE HEART FAILURE (H): Primary | ICD-10-CM

## 2025-04-08 DIAGNOSIS — Z79.899 LONG TERM USE OF DRUG: ICD-10-CM

## 2025-04-08 DIAGNOSIS — Z95.811 LEFT VENTRICULAR ASSIST DEVICE PRESENT (H): ICD-10-CM

## 2025-04-08 DIAGNOSIS — Z79.899 ENCOUNTER FOR MONITORING DIGOXIN THERAPY: ICD-10-CM

## 2025-04-08 LAB
ANION GAP SERPL CALCULATED.3IONS-SCNC: 10 MMOL/L (ref 7–15)
BUN SERPL-MCNC: 24.2 MG/DL (ref 6–20)
CALCIUM SERPL-MCNC: 9.4 MG/DL (ref 8.8–10.4)
CHLORIDE SERPL-SCNC: 99 MMOL/L (ref 98–107)
CREAT SERPL-MCNC: 1.35 MG/DL (ref 0.67–1.17)
DIGOXIN SERPL-MCNC: 1 NG/ML (ref 0.6–1.2)
EGFRCR SERPLBLD CKD-EPI 2021: 61 ML/MIN/1.73M2
GLUCOSE SERPL-MCNC: 106 MG/DL (ref 70–99)
HCO3 SERPL-SCNC: 28 MMOL/L (ref 22–29)
INR PPP: 2.64 (ref 0.85–1.15)
POTASSIUM SERPL-SCNC: 5.1 MMOL/L (ref 3.4–5.3)
SODIUM SERPL-SCNC: 137 MMOL/L (ref 135–145)

## 2025-04-08 PROCEDURE — 80162 ASSAY OF DIGOXIN TOTAL: CPT | Performed by: INTERNAL MEDICINE

## 2025-04-08 PROCEDURE — 99000 SPECIMEN HANDLING OFFICE-LAB: CPT | Performed by: PATHOLOGY

## 2025-04-08 NOTE — PROGRESS NOTES
Called pt to check in regarding med changes made at last appt, current MAP's, symptoms and to discuss volume status.   Pt did not answer.  Voicemail left requesting return call.

## 2025-04-09 ENCOUNTER — TELEPHONE (OUTPATIENT)
Dept: ANTICOAGULATION | Facility: CLINIC | Age: 60
End: 2025-04-09
Payer: COMMERCIAL

## 2025-04-09 NOTE — TELEPHONE ENCOUNTER
Talked to Akshat. His dentist started him on amoxicillin x 1 week for an abscess. He reports that in the past his INR has trended too high on amoxicillin.     Instructed to continue his same warfarin dose and recheck INR on Monday 4/14/25. If he would prefer to recheck on Friday 4/11/25 prior to the weekend that is certainly fine but we may not see any interaction yet.     We reviewed s/sx of bleeding, Akshat will monitor.     Akshat Fragoso verbalized understanding and had no other concerns or questions at this time.

## 2025-04-09 NOTE — TELEPHONE ENCOUNTER
Patient Returning Call    Reason for call:  Pt is going to be taking antibiotics for his dental procedure (Amoxicillin) and would like to get a call back to discuss dosage    Information relayed to patient:  the INR team will give you a call back when they receive this message    Patient has additional questions:  No      Could we send this information to you in [facility] or would you prefer to receive a phone call?:   Patient would prefer a phone call   Okay to leave a detailed message?: Yes at Cell number on file:    Telephone Information:   Home [phone]

## 2025-04-13 LAB — INR HOME MONITORING: 3 (ref 2–3)

## 2025-04-14 ENCOUNTER — ANTICOAGULATION THERAPY VISIT (OUTPATIENT)
Dept: ANTICOAGULATION | Facility: CLINIC | Age: 60
End: 2025-04-14
Payer: COMMERCIAL

## 2025-04-14 DIAGNOSIS — Z79.899 LONG TERM USE OF DRUG: ICD-10-CM

## 2025-04-14 DIAGNOSIS — Z95.811 LEFT VENTRICULAR ASSIST DEVICE PRESENT (H): ICD-10-CM

## 2025-04-14 DIAGNOSIS — I50.22 CHRONIC SYSTOLIC CONGESTIVE HEART FAILURE (H): ICD-10-CM

## 2025-04-14 DIAGNOSIS — Z95.811 LVAD (LEFT VENTRICULAR ASSIST DEVICE) PRESENT (H): ICD-10-CM

## 2025-04-14 DIAGNOSIS — Z79.01 ANTICOAGULATED ON COUMADIN: ICD-10-CM

## 2025-04-14 DIAGNOSIS — I50.23 ACUTE ON CHRONIC SYSTOLIC CONGESTIVE HEART FAILURE (H): Primary | ICD-10-CM

## 2025-04-14 NOTE — PROGRESS NOTES
ANTICOAGULATION MANAGEMENT     Akshat Fragoso 59 year old male is on warfarin with therapeutic INR result. (Goal INR 2.0-3.0)    Recent labs: (last 7 days)     04/13/25  0000   INR 3.0       ASSESSMENT     Source(s): Chart Review  Previous INR was Therapeutic last 2(+) visits  Medication, diet, health changes since last INR - per last anticoagulation note, pt is on amoxicillin x1 week (4/9-4/15). His INR was 3.0 yesterday (Sunday) - top end of kirby range with still a few more days of Amoxillin. I consulted with Lizette NOVOA [facility], and came up with a plan.     I advised 2.5 mg Mon, Wed, Fri and 5 mg ROW this week. I did ask that he call [facility] back if he took something other than 5 mg yesterday when INR was 3.0. 2.5 mg doses have been adjusted slightly this week only.      PLAN     Recommended plan for temporary change(s) affecting INR     Dosing Instructions:  take 2.5 mg Mon, Wed, Fri and 5 mg ROW this week  with next INR in 1 week       Summary  As of 4/14/2025      Full warfarin instructions:  4/14: 2.5 mg; 4/15: 5 mg; 4/16: 2.5 mg; Otherwise 2.5 mg every Tue, Fri; 5 mg all other days   Next INR check:  4/21/2025               Detailed voice message left for Akshat with dosing instructions and follow up date.   Sent [employer] message with dosing and follow up instructions    Patient to recheck with home meter    Education provided: Please call back if any changes to your diet, medications or how you've been taking warfarin  Goal range and lab monitoring: goal range and significance of current result, Importance of therapeutic range, and Importance of following up at instructed interval  Symptom monitoring: monitoring for bleeding signs and symptoms    Plan made with [facility] Pharmacist Lizette Yi and per LVAD protocol    Mary Alcantara, RN  4/14/2025  Anticoagulation Clinic  [facility] for routing messages: earnest ANTICOAG LVAD  [facility] patient phone line:  [phone]        _______________________________________________________________________     Anticoagulation Episode Summary       Current INR goal:  2.0-3.0   TTR:  91.0% (11.8 mo)   Target end date:  Indefinite   Send INR reminders to:  ANTICOAG LVAD    Indications    Acute on chronic systolic congestive heart failure (H) [I50.23]  LVAD (left ventricular assist device) present (H) [Z95.811]  Chronic systolic congestive heart failure (H) [I50.22]  Long term use of drug [Z79.899]  Left ventricular assist device present (H) [Z95.811]  Anticoagulated on Coumadin [Z79.01]             Comments:  Follow VAD Anticoag protocol:Yes: HeartMate 3   Bridging: Enoxaparin   Date VAD placed: 3/23/23  [employer] home monitor             Anticoagulation Care Providers       Provider Role Specialty Phone number    Kenzie Moreau MD Referring Advanced Heart Failure and Transplant Cardiology [phone]    Mile Bolivar, APRN CNP Referring Nurse Practitioner [phone]    Shaun Aguiar MD Referring Cardiovascular Disease [phone]

## 2025-04-17 ENCOUNTER — MYC MEDICAL ADVICE (OUTPATIENT)
Dept: OTHER | Age: 60
End: 2025-04-17

## 2025-04-17 DIAGNOSIS — I50.22 CHRONIC SYSTOLIC CONGESTIVE HEART FAILURE (H): ICD-10-CM

## 2025-04-17 DIAGNOSIS — I47.20 VENTRICULAR TACHYCARDIA (H): ICD-10-CM

## 2025-04-17 DIAGNOSIS — Z95.811 LVAD (LEFT VENTRICULAR ASSIST DEVICE) PRESENT (H): ICD-10-CM

## 2025-04-17 RX ORDER — AMIODARONE HYDROCHLORIDE 200 MG/1
100 TABLET ORAL DAILY
Qty: 45 TABLET | Refills: 3 | Status: CANCELLED | OUTPATIENT
Start: 2025-04-17

## 2025-04-17 RX ORDER — LISINOPRIL 2.5 MG/1
10 TABLET ORAL EVERY EVENING
Qty: 360 TABLET | Refills: 3 | Status: SHIPPED | OUTPATIENT
Start: 2025-04-17

## 2025-04-17 RX ORDER — DIGOXIN 0.06 MG/1
TABLET ORAL
Qty: 132 TABLET | Refills: 3 | Status: SHIPPED | OUTPATIENT
Start: 2025-04-17

## 2025-04-17 RX ORDER — LISINOPRIL 2.5 MG/1
7.5 TABLET ORAL DAILY
Qty: 270 TABLET | Refills: 3 | Status: CANCELLED | OUTPATIENT
Start: 2025-04-17

## 2025-04-17 RX ORDER — DIGOXIN 0.06 MG/1
TABLET ORAL
Qty: 132 TABLET | Refills: 3 | Status: CANCELLED | OUTPATIENT
Start: 2025-04-17

## 2025-04-17 RX ORDER — AMIODARONE HYDROCHLORIDE 200 MG/1
100 TABLET ORAL DAILY
Qty: 45 TABLET | Refills: 3 | Status: SHIPPED | OUTPATIENT
Start: 2025-04-17

## 2025-04-21 ENCOUNTER — TELEPHONE (OUTPATIENT)
Dept: FAMILY MEDICINE | Facility: CLINIC | Age: 60
End: 2025-04-21
Payer: COMMERCIAL

## 2025-04-21 ENCOUNTER — ANTICOAGULATION THERAPY VISIT (OUTPATIENT)
Dept: ANTICOAGULATION | Facility: CLINIC | Age: 60
End: 2025-04-21
Payer: COMMERCIAL

## 2025-04-21 DIAGNOSIS — Z95.811 LEFT VENTRICULAR ASSIST DEVICE PRESENT (H): ICD-10-CM

## 2025-04-21 DIAGNOSIS — Z95.811 LVAD (LEFT VENTRICULAR ASSIST DEVICE) PRESENT (H): ICD-10-CM

## 2025-04-21 DIAGNOSIS — I50.23 ACUTE ON CHRONIC SYSTOLIC CONGESTIVE HEART FAILURE (H): Primary | ICD-10-CM

## 2025-04-21 DIAGNOSIS — Z79.01 ANTICOAGULATED ON COUMADIN: ICD-10-CM

## 2025-04-21 DIAGNOSIS — I50.22 CHRONIC SYSTOLIC CONGESTIVE HEART FAILURE (H): ICD-10-CM

## 2025-04-21 DIAGNOSIS — Z79.899 LONG TERM USE OF DRUG: ICD-10-CM

## 2025-04-21 LAB — INR HOME MONITORING: 3.3 (ref 2–3)

## 2025-04-21 NOTE — PROGRESS NOTES
ANTICOAGULATION MANAGEMENT     Akshat Fragoso 60 year old male is on warfarin with supratherapeutic INR result. (Goal INR 2.0-3.0)    Recent labs: (last 7 days)     04/21/25  0000   INR 3.3*       ASSESSMENT     Source(s): Chart Review and Patient/Caregiver Call     Warfarin doses taken: Warfarin taken as instructed  Diet:  ate a little different yesterday due to easter may be affecting diet and INR  Medication/supplement changes:  Finished amoxicillin about 5 days ago  New illness, injury, or hospitalization: No  Signs or symptoms of bleeding or clotting: No. Patient notes some redness on his face and ears today, but wife believes this is due to him being out in the sun.   Previous result: Therapeutic last 2(+) visits  Additional findings: None       PLAN     Recommended plan for temporary change(s) affecting INR     Dosing Instructions: partial hold then continue your current warfarin dose with next INR in 1 week       Summary  As of 4/21/2025      Full warfarin instructions:  4/21: 2.5 mg; Otherwise 2.5 mg every Tue, Fri; 5 mg all other days   Next INR check:  4/28/2025               Telephone call with Akshat and wife, Landry who agrees to plan and repeated back plan correctly    Patient to recheck with home meter    Education provided: Goal range and lab monitoring: goal range and significance of current result and Importance of following up at instructed interval    Plan made per [facility] anticoagulation protocol and per LVAD protocol    Maggy Grover RN  4/21/2025  Anticoagulation Clinic  [employer] for routing messages: p ANTICOAG LVAD  [facility] patient phone line: [phone]        _______________________________________________________________________     Anticoagulation Episode Summary       Current INR goal:  2.0-3.0   TTR:  88.8% (11.8 mo)   Target end date:  Indefinite   Send INR reminders to:  ANTICOAG LVAD    Indications    Acute on chronic systolic congestive heart failure (H) [I50.23]  LVAD (left ventricular  assist device) present (H) [Z95.811]  Chronic systolic congestive heart failure (H) [I50.22]  Long term use of drug [Z79.899]  Left ventricular assist device present (H) [Z95.811]  Anticoagulated on Coumadin [Z79.01]             Comments:  Follow VAD Anticoag protocol:Yes: HeartMate 3   Bridging: Enoxaparin   Date VAD placed: 3/23/23  [employer] home monitor             Anticoagulation Care Providers       Provider Role Specialty Phone number    Kenzie Moreau MD Referring Advanced Heart Failure and Transplant Cardiology [phone]    Mile Bolivar, VERONICA CNP Referring Nurse Practitioner [phone]    Shaun Aguiar MD Referring Cardiovascular Disease [phone]

## 2025-04-21 NOTE — TELEPHONE ENCOUNTER
Test Results Please call today. Thank you        Who ordered the test:  Patient's wife calling to give results of a home INR for  who thinks his dosage will need to be changed tonight. The INR result is 3.3    Type of test: Lab and INR    Date of test:  4/21 HOME INR    Where was the test performed:  HOME    What are your questions/concerns?:  Needs dosage change for this evening    Could we send this information to you in [place] or would you prefer to receive a phone call?:   No preference   Okay to leave a detailed message?: Yes at Cell number on file:    Telephone Information:   Mobile [phone]

## 2025-04-28 ENCOUNTER — ANTICOAGULATION THERAPY VISIT (OUTPATIENT)
Dept: ANTICOAGULATION | Facility: CLINIC | Age: 60
End: 2025-04-28
Payer: COMMERCIAL

## 2025-04-28 DIAGNOSIS — Z95.811 LVAD (LEFT VENTRICULAR ASSIST DEVICE) PRESENT (H): ICD-10-CM

## 2025-04-28 DIAGNOSIS — I50.23 ACUTE ON CHRONIC SYSTOLIC CONGESTIVE HEART FAILURE (H): Primary | ICD-10-CM

## 2025-04-28 DIAGNOSIS — I50.22 CHRONIC SYSTOLIC CONGESTIVE HEART FAILURE (H): ICD-10-CM

## 2025-04-28 DIAGNOSIS — Z95.811 LEFT VENTRICULAR ASSIST DEVICE PRESENT (H): ICD-10-CM

## 2025-04-28 DIAGNOSIS — Z79.01 ANTICOAGULATED ON COUMADIN: ICD-10-CM

## 2025-04-28 DIAGNOSIS — Z79.899 LONG TERM USE OF DRUG: ICD-10-CM

## 2025-04-28 LAB — INR HOME MONITORING: 2.9 (ref 2–3)

## 2025-04-28 NOTE — PROGRESS NOTES
ANTICOAGULATION MANAGEMENT     Akshat Fragoso 60 year old male is on warfarin with therapeutic INR result. (Goal INR 2.0-3.0)    Recent labs: (last 7 days)     04/28/25  0000   INR 2.9       ASSESSMENT     Source(s): Chart Review and Patient/Caregiver Call     Warfarin doses taken: Warfarin taken as instructed  Diet: No new diet changes identified  Medication/supplement changes: None noted  New illness, injury, or hospitalization: No  Signs or symptoms of bleeding or clotting: No  Previous result: Supratherapeutic  Additional findings:  Shared clinical decision making with patient today. Pt wishes to decrease maintenance dose of warfarin down previous dosing of 2.5mg 3/week and 5mg all other days which would equal the amount of warfarin taken over the past week.        PLAN     Recommended plan for no diet, medication or health factor changes affecting INR     Dosing Instructions: decrease your warfarin dose (8.3% change) with next INR in 1 week       Summary  As of 4/28/2025      Full warfarin instructions:  2.5 mg every Mon, Wed, Fri; 5 mg all other days   Next INR check:  5/5/2025               Telephone call with Akshat who verbalizes understanding and agrees to plan  [employer] message sent with dosing and INR recheck date     Patient to recheck with home meter    Education provided: Goal range and lab monitoring: goal range and significance of current result, Importance of therapeutic range, and Importance of following up at instructed interval    Plan made per [facility] anticoagulation protocol and per LVAD protocol/shared clinical decision making     Golden Lenz RN  4/28/2025  Anticoagulation Clinic  [employer] for routing messages: [employer] SOMMER LVAD  [facility] patient phone line: [phone]        _______________________________________________________________________     Anticoagulation Episode Summary       Current INR goal:  2.0-3.0   TTR:  87.3% (11.8 mo)   Target end date:  Indefinite   Send INR reminders to:  ROS  LVAD    Indications    Acute on chronic systolic congestive heart failure (H) [I50.23]  LVAD (left ventricular assist device) present (H) [Z95.811]  Chronic systolic congestive heart failure (H) [I50.22]  Long term use of drug [Z79.899]  Left ventricular assist device present (H) [Z95.811]  Anticoagulated on Coumadin [Z79.01]             Comments:  Follow VAD Anticoag protocol:Yes: HeartMate 3   Bridging: Enoxaparin   Date VAD placed: 3/23/23  [employer] home monitor             Anticoagulation Care Providers       Provider Role Specialty Phone number    Kenzie Moreau MD Referring Advanced Heart Failure and Transplant Cardiology [phone]    Mile Bolivar, APRN CNP Referring Nurse Practitioner [phone]    Shaun Aguiar MD Referring Cardiovascular Disease [phone]

## 2025-05-05 ENCOUNTER — ANTICOAGULATION THERAPY VISIT (OUTPATIENT)
Dept: ANTICOAGULATION | Facility: CLINIC | Age: 60
End: 2025-05-05
Payer: COMMERCIAL

## 2025-05-05 DIAGNOSIS — Z79.01 ANTICOAGULATED ON COUMADIN: ICD-10-CM

## 2025-05-05 DIAGNOSIS — I50.23 ACUTE ON CHRONIC SYSTOLIC CONGESTIVE HEART FAILURE (H): Primary | ICD-10-CM

## 2025-05-05 DIAGNOSIS — Z95.811 LEFT VENTRICULAR ASSIST DEVICE PRESENT (H): ICD-10-CM

## 2025-05-05 DIAGNOSIS — Z79.899 LONG TERM USE OF DRUG: ICD-10-CM

## 2025-05-05 DIAGNOSIS — I50.22 CHRONIC SYSTOLIC CONGESTIVE HEART FAILURE (H): ICD-10-CM

## 2025-05-05 DIAGNOSIS — Z95.811 LVAD (LEFT VENTRICULAR ASSIST DEVICE) PRESENT (H): ICD-10-CM

## 2025-05-05 LAB — INR HOME MONITORING: 2.8 (ref 2–3)

## 2025-05-05 NOTE — PROGRESS NOTES
ANTICOAGULATION MANAGEMENT     Akshat Fragoso 60 year old male is on warfarin with therapeutic INR result. (Goal INR 2.0-3.0)    Recent labs: (last 7 days)     05/05/25  0000   INR 2.8       ASSESSMENT     Source(s): Chart Review and Patient/Caregiver Call     Warfarin doses taken: Warfarin taken as instructed  Diet: No new diet changes identified  Medication/supplement changes:  Patient reports amoxicillin x1 week for ongoing tooth abscess  New illness, injury, or hospitalization: Yes: tooth abscess  Signs or symptoms of bleeding or clotting: No  Previous result: Therapeutic last visit; previously outside of goal range  Additional findings: None       PLAN     Recommended plan for temporary change(s) affecting INR     Dosing Instructions: Continue your current warfarin dose with next INR in 3 days       Summary  As of 5/5/2025      Full warfarin instructions:  2.5 mg every Mon, Wed, Fri; 5 mg all other days   Next INR check:  5/8/2025               Telephone call with Akshat who agrees to plan and repeated back plan correctly    Patient to recheck with home meter    Education provided: Goal range and lab monitoring: goal range and significance of current result and Importance of following up at instructed interval  Interaction IS anticipated between warfarin and amoxicillin    Plan made per [facility] anticoagulation protocol and per LVAD protocol    Maggy Grover RN  5/5/2025  Anticoagulation Clinic  [employer] for routing messages: earnest ANTICOAG LVAD  [facility] patient phone line: [phone]        _______________________________________________________________________     Anticoagulation Episode Summary       Current INR goal:  2.0-3.0   TTR:  87.3% (11.8 mo)   Target end date:  Indefinite   Send INR reminders to:  FREDYAG LVAD    Indications    Acute on chronic systolic congestive heart failure (H) [I50.23]  LVAD (left ventricular assist device) present (H) [Z95.811]  Chronic systolic congestive heart failure (H)  [I50.22]  Long term use of drug [Z79.899]  Left ventricular assist device present (H) [Z95.811]  Anticoagulated on Coumadin [Z79.01]             Comments:  Follow VAD Anticoag protocol:Yes: HeartMate 3   Bridging: Enoxaparin   Date VAD placed: 3/23/23  [employer] home monitor             Anticoagulation Care Providers       Provider Role Specialty Phone number    Kenzie Moreau MD Referring Advanced Heart Failure and Transplant Cardiology [phone]    Mile Bolivar, APRN CNP Referring Nurse Practitioner [phone]    Shaun Aguiar MD Referring Cardiovascular Disease [phone]

## 2025-05-07 ENCOUNTER — TELEPHONE (OUTPATIENT)
Dept: CARDIOLOGY | Facility: CLINIC | Age: 60
End: 2025-05-07
Payer: COMMERCIAL

## 2025-05-07 ENCOUNTER — CARE COORDINATION (OUTPATIENT)
Dept: CARDIOLOGY | Facility: CLINIC | Age: 60
End: 2025-05-07
Payer: COMMERCIAL

## 2025-05-07 ENCOUNTER — TELEPHONE (OUTPATIENT)
Dept: FAMILY MEDICINE | Facility: CLINIC | Age: 60
End: 2025-05-07
Payer: COMMERCIAL

## 2025-05-07 DIAGNOSIS — I50.22 CHRONIC SYSTOLIC CONGESTIVE HEART FAILURE (H): ICD-10-CM

## 2025-05-07 DIAGNOSIS — Z79.2 NEED FOR ANTIBIOTIC PROPHYLAXIS FOR DENTAL PROCEDURE: Primary | ICD-10-CM

## 2025-05-07 DIAGNOSIS — Z95.811 LVAD (LEFT VENTRICULAR ASSIST DEVICE) PRESENT (H): ICD-10-CM

## 2025-05-07 RX ORDER — AMOXICILLIN 500 MG/1
2000 TABLET, FILM COATED ORAL PRN
Qty: 4 TABLET | Refills: 2 | Status: CANCELLED | OUTPATIENT
Start: 2025-05-07

## 2025-05-07 RX ORDER — AMOXICILLIN 500 MG/1
2000 TABLET, FILM COATED ORAL PRN
Qty: 4 TABLET | Refills: 0 | Status: SHIPPED | OUTPATIENT
Start: 2025-05-07

## 2025-05-07 RX ORDER — AMOXICILLIN 500 MG/1
2000 TABLET, FILM COATED ORAL PRN
Qty: 4 TABLET | Refills: 2 | Status: SHIPPED | OUTPATIENT
Start: 2025-05-07 | End: 2025-05-07

## 2025-05-07 NOTE — TELEPHONE ENCOUNTER
"Preliminary Device Interrogation Results.  Final physician signed paceart report to be scanned and attached.    [employer], single chamber ICD, remote transmission received and reviewed. Device transmission sent per MD orders. Pt called on-call device RN reporting \"irregular heart  Beats\". His presenting rhythm is SR 80 bpm with no ectopy noted. No arrhythmias have been recorded since his last transmission on 11/9/18. Normal device function. = 0%. Lead trends appear stable. Battery estimates 12 years to NEETA. Pt's wife, Landry, notified of transmission results. She reports Akshat is feeling well.  Remote ICD transmission      " Jas Zhang(Attending)

## 2025-05-07 NOTE — PROGRESS NOTES
Patient called to request a refill for Amoxicillin for ongoing tooth pain.    Advised pt we do not prescribe ongoing abx for tooth pain.  Instructed pt to call dentist to prescribe these abx if they see fit.    Pt agreeable.

## 2025-05-07 NOTE — TELEPHONE ENCOUNTER
[facility] Prior Authorization Team Request    Medication:  Digoxin 62.5  Dosing: Take 1 tablet by mouth on Monday, Wednesday and Friday   NDC (required for Medicaid members): [address]-90  Insurance Company: [employer]  BIN: [id number]  PCN: [id number]  Grp: [id number]  ID: [id number]  CoverMyMeds Key (if applicable):   Additional documentation:   Pharmacy Filling the Rx:  [facility]  Filling Pharmacy Phone:  [phone]  Contact:  PHARM [facility] PA (P [id number]) please send all responses to this pool.  Pharmacy NPI (required for Medicaid members):[id number]

## 2025-05-07 NOTE — TELEPHONE ENCOUNTER
FYI - Status Update    Who is Calling: family member, called for an update of the a antibiotic to be sent to patient for dental pain.      [facility]  [address], [place]  [address]  [phone]    Does caller want a call/response back: Yes     Could we [facility] this information [facility] or would you prefer to receive a phone call?:   Patient would prefer a phone call   Okay to leave a detailed message?: Yes at Cell number on file:    Telephone Information:   Mobile [phone]

## 2025-05-07 NOTE — TELEPHONE ENCOUNTER
Relayed to Cheryl and patient that rx was sent in to [facility] pharmacy.    CARISA HerronN, PHN, AMB-BC (she/her)  [facility] RN

## 2025-05-10 NOTE — TELEPHONE ENCOUNTER
PA Initiation    Medication: DIGOXIN 62.5 MCG PO TABS  Insurance Company: [employer] - Phone [phone] Fax [phone]  Pharmacy Filling the Rx: [facility] [place], [place] - [address] [place] [address] [phone]  Filling Pharmacy Phone: [phone]  Filling Pharmacy Fax: [phone]  Start Date: 5/10/2025    Plan would not allow PA on CMM - manually faxed

## 2025-05-12 ENCOUNTER — ANTICOAGULATION THERAPY VISIT (OUTPATIENT)
Dept: ANTICOAGULATION | Facility: CLINIC | Age: 60
End: 2025-05-12
Payer: COMMERCIAL

## 2025-05-12 ENCOUNTER — RESULTS FOLLOW-UP (OUTPATIENT)
Dept: ANTICOAGULATION | Facility: CLINIC | Age: 60
End: 2025-05-12

## 2025-05-12 DIAGNOSIS — I50.23 ACUTE ON CHRONIC SYSTOLIC CONGESTIVE HEART FAILURE (H): Primary | ICD-10-CM

## 2025-05-12 DIAGNOSIS — Z95.811 LVAD (LEFT VENTRICULAR ASSIST DEVICE) PRESENT (H): ICD-10-CM

## 2025-05-12 DIAGNOSIS — Z79.899 LONG TERM USE OF DRUG: ICD-10-CM

## 2025-05-12 DIAGNOSIS — Z95.811 LEFT VENTRICULAR ASSIST DEVICE PRESENT (H): ICD-10-CM

## 2025-05-12 DIAGNOSIS — Z79.01 ANTICOAGULATED ON COUMADIN: ICD-10-CM

## 2025-05-12 DIAGNOSIS — I50.22 CHRONIC SYSTOLIC CONGESTIVE HEART FAILURE (H): ICD-10-CM

## 2025-05-12 LAB — INR HOME MONITORING: 3.2 (ref 2–3)

## 2025-05-12 NOTE — PROGRESS NOTES
ANTICOAGULATION MANAGEMENT     Akshat Fragoso 60 year old male is on warfarin with supratherapeutic INR result. (Goal INR 2.0-3.0)    Recent labs: (last 7 days)     05/12/25  0000   INR 3.2*       ASSESSMENT     Source(s): Chart Review and Patient/Caregiver Call     Warfarin doses taken: Warfarin taken as instructed  Diet: No new diet changes identified  Medication/supplement changes: Was on Amoxicillin for a week last week. Per Akshat, Dentist wanted another 10 day course (week left) before having tooth pulled - can increase bleeding risk/INR (Akshat says this antibiotic has increased his INR in the past). Also taking Tylenol 1000 mg daily - increased bleeding risk.  New illness, injury, or hospitalization: Ongoing tooth abscess/cyst - was having swelling for a few days now swelling is down to normal, denies fever - will be having this tooth pulled  Signs or symptoms of bleeding or clotting: No  Previous result: Therapeutic last 2(+) visits  Additional findings: Upcoming surgery/procedure [facility] 5/30/25, target INR <3 per VAD protocol       PLAN     Recommended plan for temporary change(s) affecting INR     Dosing Instructions: hold dose then continue your current warfarin dose with next INR in 1 week       Summary  As of 5/12/2025      Full warfarin instructions:  5/12: Hold; Otherwise 2.5 mg every Mon, Wed, Fri; 5 mg all other days   Next INR check:  5/19/2025               Telephone call with Akshat who agrees to plan and repeated back plan correctly  Sent [employer] message with dosing and follow up instructions    Patient to recheck with home meter    Education provided: Goal range and lab monitoring: goal range and significance of current result  Interaction IS anticipated between warfarin and Amoxicillin and Tylenol  Symptom monitoring: monitoring for bleeding signs and symptoms and when to seek medical attention/emergency care  Importance of notifying anticoagulation clinic for: antibiotic changes  Written instructions  provided  Contact [phone] with any changes, questions or concerns.     Plan made with [facility] Pharmacist Jillian Muñoz and per LVAD protocol    Justina Wilkins RN  5/12/2025  Anticoagulation Clinic  [facility] for routing messages: p ANTICOAG LVAD  [facility] patient phone line: [phone]        _______________________________________________________________________     Anticoagulation Episode Summary       Current INR goal:  2.0-3.0   TTR:  86.3% (11.8 mo)   Target end date:  Indefinite   Send INR reminders to:  ANTICOAG LVAD    Indications    Acute on chronic systolic congestive heart failure (H) [I50.23]  LVAD (left ventricular assist device) present (H) [Z95.811]  Chronic systolic congestive heart failure (H) [I50.22]  Long term use of drug [Z79.899]  Left ventricular assist device present (H) [Z95.811]  Anticoagulated on Coumadin [Z79.01]             Comments:  Follow VAD Anticoag protocol:Yes: HeartMate 3   Bridging: Enoxaparin   Date VAD placed: 3/23/23  [employer] home monitor             Anticoagulation Care Providers       Provider Role Specialty Phone number    Kenize Moreau MD Referring Advanced Heart Failure and Transplant Cardiology [phone]    Mile Bolivar, APRN CNP Referring Nurse Practitioner [phone]    Shaun Aguiar MD Referring Cardiovascular Disease [phone]

## 2025-05-13 NOTE — TELEPHONE ENCOUNTER
Prior Authorization Approval    Medication: DIGOXIN 62.5 MCG PO TABS  Authorization Effective Date: 5/13/2025  Authorization Expiration Date: 5/13/2026  Approved Dose/Quantity: as written  Reference #: [id number]   Insurance Company: [employer] - Phone [phone] Fax [phone]  Which Pharmacy is filling the prescription: [facility] [place], [place] - [address] [place] [address] [id number]  Pharmacy Notified: y  Patient Notified: Instructed pharmacy to notify patient once order is ready.

## 2025-05-20 ENCOUNTER — RESULTS FOLLOW-UP (OUTPATIENT)
Dept: ANTICOAGULATION | Facility: CLINIC | Age: 60
End: 2025-05-20

## 2025-05-20 ENCOUNTER — ANTICOAGULATION THERAPY VISIT (OUTPATIENT)
Dept: ANTICOAGULATION | Facility: CLINIC | Age: 60
End: 2025-05-20
Payer: COMMERCIAL

## 2025-05-20 DIAGNOSIS — Z95.811 LVAD (LEFT VENTRICULAR ASSIST DEVICE) PRESENT (H): ICD-10-CM

## 2025-05-20 DIAGNOSIS — Z95.811 LEFT VENTRICULAR ASSIST DEVICE PRESENT (H): ICD-10-CM

## 2025-05-20 DIAGNOSIS — I50.22 CHRONIC SYSTOLIC CONGESTIVE HEART FAILURE (H): ICD-10-CM

## 2025-05-20 DIAGNOSIS — Z79.899 LONG TERM USE OF DRUG: ICD-10-CM

## 2025-05-20 DIAGNOSIS — I50.23 ACUTE ON CHRONIC SYSTOLIC CONGESTIVE HEART FAILURE (H): Primary | ICD-10-CM

## 2025-05-20 DIAGNOSIS — Z79.01 ANTICOAGULATED ON COUMADIN: ICD-10-CM

## 2025-05-20 LAB — INR HOME MONITORING: 2.7 (ref 2–3)

## 2025-05-20 NOTE — PROGRESS NOTES
ANTICOAGULATION MANAGEMENT     Akshat Fragoso 60 year old male is on warfarin with therapeutic INR result. (Goal INR 2.0-3.0)    Recent labs: (last 7 days)     05/20/25  0000   INR 2.7       ASSESSMENT     Source(s): Chart Review and Patient/Caregiver Call     Warfarin doses taken: Warfarin taken as instructed-hold x 1 last week   Diet: No new diet changes identified  Medication/supplement changes: continues on Amoxicillin until Thursday  New illness, injury, or hospitalization: facial swelling and pain has decreased significantly while on Amoxicillin. Tooth has not been extracted yet.   Signs or symptoms of bleeding or clotting: No  Previous result: Supratherapeutic  Additional findings: Upcoming surgery/procedure RHC next week. Target INR less than 3 for VAD patient.        PLAN     Recommended plan for temporary change(s) and ongoing change(s) affecting INR     Dosing Instructions: Continue your current warfarin dose with next INR in 1 week       Summary  As of 5/20/2025      Full warfarin instructions:  2.5 mg every Mon, Wed, Fri; 5 mg all other days   Next INR check:  5/27/2025               Telephone call with Akshat who agrees to plan and repeated back plan correctly    Patient to recheck with home meter    Education provided: Goal range and lab monitoring: goal range and significance of current result and Importance of following up at instructed interval  Interaction IS anticipated between warfarin and amoxicillin  Symptom monitoring: monitoring for bleeding signs and symptoms  Contact [phone] with any changes, questions or concerns.     Plan made per [facility] anticoagulation protocol and per LVAD protocol    TREV LYN RN  5/20/2025  Anticoagulation Clinic  [employer] for routing messages: [employer] [facility] LV[facility]  [facility] patient phone line: [phone]        _______________________________________________________________________     Anticoagulation Episode Summary       Current INR goal:  2.0-3.0   TTR:  85.4% (11.8  mo)   Target end date:  Indefinite   Send INR reminders to:  ANTICOAG LVAD    Indications    Acute on chronic systolic congestive heart failure (H) [I50.23]  LVAD (left ventricular assist device) present (H) [Z95.811]  Chronic systolic congestive heart failure (H) [I50.22]  Long term use of drug [Z79.899]  Left ventricular assist device present (H) [Z95.811]  Anticoagulated on Coumadin [Z79.01]             Comments:  Follow VAD Anticoag protocol:Yes: HeartMate 3   Bridging: Enoxaparin   Date VAD placed: 3/23/23  [employer] home monitor             Anticoagulation Care Providers       Provider Role Specialty Phone number    Kenzie Moreau MD Referring Advanced Heart Failure and Transplant Cardiology [phone]    Mile Bolivar, VERONICA CNP Referring Nurse Practitioner [phone]    Shaun Aguiar MD Referring Cardiovascular Disease [phone]

## 2025-05-28 DIAGNOSIS — I50.23 ACUTE ON CHRONIC SYSTOLIC CONGESTIVE HEART FAILURE (H): ICD-10-CM

## 2025-05-28 DIAGNOSIS — I50.22 CHRONIC SYSTOLIC HEART FAILURE (H): Primary | ICD-10-CM

## 2025-05-30 ENCOUNTER — RESULTS FOLLOW-UP (OUTPATIENT)
Dept: ANTICOAGULATION | Facility: CLINIC | Age: 60
End: 2025-05-30

## 2025-06-03 NOTE — PROGRESS NOTES
"PEG: A Three-Item Scale Assessing Pain Intensity and Interference    What number best describes your PAIN ON AVERAGE in the past week?  8    What number best describes how, during the past week, pain has interfered with your ENJOYMENT OF LIFE?  8     What number best describes how, during the past week, pain has interfered with your GENERAL ACTIVITY?  9    PEG Total Score:8.33      Cassidy ACNALES, Karan KA, Sedrick MJ, Sabino TA, Calvin J, Frankie JM, Yohana SM, Sukumar K. Development and initial validation of the PEG, a 3-item scale assessing pain intensity and interference. Journal of General Internal Medicine. 2009 Miguel A;24:733-738.    Akshat Fragoso is a 58 year old male who presents for:  No chief complaint on file.       Initial Vitals:  BP (!) 85/60   Pulse 109   Wt 81.6 kg (180 lb)   SpO2 95%   BMI 28.19 kg/m   Estimated body mass index is 28.19 kg/m  as calculated from the following:    Height as of 8/7/23: 1.702 m (5' 7\").    Weight as of this encounter: 81.6 kg (180 lb).. Body surface area is 1.96 meters squared. BP completed using cuff size: regular  Extreme Pain (8)    Nursing Comments:     Holley El MA      " HPI:  89M w hx of prostate cancer (s/p prostatectomy now metastatic to liver and lung) on chemo last received 1 week PTA on 5/25/25, pulmonary wedge resection, HepB (on entecavir), HTN, and HLD previously admitted from 4/26-4/29 for neutropenic sepsis in setting of colitis s/p treatment with cefepime and metronidazole, here for fever associated with generalized weakness.     PAST MEDICAL & SURGICAL HISTORY:  HTN (hypertension)      HLD (hyperlipidemia)      Prostate cancer      S/P prostatectomy        Allergies    No Known Allergies    Intolerances      Social History:    Medications:  acetaminophen     Tablet .. 650 milliGRAM(s) Oral every 6 hours PRN Temp greater or equal to 38C (100.4F), Mild Pain (1 - 3)  albuterol/ipratropium for Nebulization 3 milliLiter(s) Nebulizer every 8 hours  aluminum hydroxide/magnesium hydroxide/simethicone Suspension 30 milliLiter(s) Oral every 4 hours PRN Dyspepsia  benzonatate 100 milliGRAM(s) Oral every 8 hours PRN Cough  guaifenesin/dextromethorphan Oral Liquid 10 milliLiter(s) Oral every 4 hours PRN Cough  heparin   Injectable 5000 Unit(s) SubCutaneous every 8 hours  levothyroxine 100 MICROGram(s) Oral daily  midodrine. 5 milliGRAM(s) Oral three times a day  pantoprazole  Injectable 40 milliGRAM(s) IV Push daily  predniSONE   Tablet 5 milliGRAM(s) Oral two times a day  sertraline 25 milliGRAM(s) Oral daily    Labs:  CBC Full  -  ( 03 Jun 2025 07:13 )  WBC Count : 4.81 K/uL  RBC Count : 3.29 M/uL  Hemoglobin : 10.4 g/dL  Hematocrit : 33.1 %  Platelet Count - Automated : 148 K/uL  Mean Cell Volume : 100.6 fl  Mean Cell Hemoglobin : 31.6 pg  Mean Cell Hemoglobin Concentration : 31.4 g/dL  Auto Neutrophil # : x  Auto Lymphocyte # : x  Auto Monocyte # : x  Auto Eosinophil # : x  Auto Basophil # : x  Auto Neutrophil % : x  Auto Lymphocyte % : x  Auto Monocyte % : x  Auto Eosinophil % : x  Auto Basophil % : x    06-03    136  |  96  |  23  ----------------------------<  84  4.5   |  27  |  0.64    Ca    9.2      03 Jun 2025 07:13  Phos  3.0     06-03  Mg     1.9     06-03    TPro  6.1  /  Alb  2.5[L]  /  TBili  0.7  /  DBili  x   /  AST  76[H]  /  ALT  17  /  AlkPhos  179[H]  06-03      Radiology:             ROS:  Patient comfortable without distress  No SOB or chest pain  No palpitation  No abdominal pain, diarrhaea or constipation  No weakness of extremities, general weakness  No skin changes or swelling of legs  Rest of the comprehensive ROS was negative  Vital Signs Last 24 Hrs  T(C): 36.3 (03 Jun 2025 04:59), Max: 36.9 (02 Jun 2025 20:43)  T(F): 97.4 (03 Jun 2025 04:59), Max: 98.4 (02 Jun 2025 20:43)  HR: 80 (03 Jun 2025 04:59) (80 - 85)  BP: 126/60 (03 Jun 2025 04:59) (111/63 - 128/67)  BP(mean): --  RR: 18 (03 Jun 2025 04:59) (18 - 18)  SpO2: 95% (03 Jun 2025 04:59) (94% - 96%)    Parameters below as of 03 Jun 2025 04:59  Patient On (Oxygen Delivery Method): nasal cannula  O2 Flow (L/min): 3      Physical exam:    No distress  CVS: S1, S2 regular or murmur  Chest: bilateral breath sound without rales  Abdomen: soft, not tender, no organomegaly or masses  CNS: No focal neuro deficit  Musculoskeletal:  Normal range of motion  Skin: No rash    Assessment and Plan:

## 2025-06-04 DIAGNOSIS — I50.22 CHRONIC SYSTOLIC CONGESTIVE HEART FAILURE (H): Primary | ICD-10-CM

## 2025-06-04 DIAGNOSIS — Z95.811 LVAD (LEFT VENTRICULAR ASSIST DEVICE) PRESENT (H): ICD-10-CM

## 2025-06-04 DIAGNOSIS — Z79.01 ANTICOAGULATED ON WARFARIN: ICD-10-CM

## 2025-06-05 ENCOUNTER — RESULTS FOLLOW-UP (OUTPATIENT)
Dept: CARDIOLOGY | Facility: CLINIC | Age: 60
End: 2025-06-05

## 2025-06-05 ENCOUNTER — APPOINTMENT (OUTPATIENT)
Dept: MEDSURG UNIT | Facility: CLINIC | Age: 60
End: 2025-06-05
Attending: INTERNAL MEDICINE
Payer: COMMERCIAL

## 2025-06-05 ENCOUNTER — APPOINTMENT (OUTPATIENT)
Dept: LAB | Facility: CLINIC | Age: 60
End: 2025-06-05
Attending: INTERNAL MEDICINE
Payer: COMMERCIAL

## 2025-06-05 ENCOUNTER — DOCUMENTATION ONLY (OUTPATIENT)
Dept: ANTICOAGULATION | Facility: CLINIC | Age: 60
End: 2025-06-05

## 2025-06-05 ENCOUNTER — HOSPITAL ENCOUNTER (OUTPATIENT)
Facility: CLINIC | Age: 60
Discharge: HOME | End: 2025-06-05
Attending: INTERNAL MEDICINE | Admitting: INTERNAL MEDICINE
Payer: COMMERCIAL

## 2025-06-05 VITALS
OXYGEN SATURATION: 94 % | HEART RATE: 98 BPM | RESPIRATION RATE: 20 BRPM | BODY MASS INDEX: 29.01 KG/M2 | WEIGHT: 185.2 LBS | SYSTOLIC BLOOD PRESSURE: 102 MMHG | DIASTOLIC BLOOD PRESSURE: 70 MMHG | TEMPERATURE: 98.4 F

## 2025-06-05 DIAGNOSIS — Z79.01 ANTICOAGULATED ON COUMADIN: ICD-10-CM

## 2025-06-05 DIAGNOSIS — Z95.811 LEFT VENTRICULAR ASSIST DEVICE PRESENT (H): ICD-10-CM

## 2025-06-05 DIAGNOSIS — I50.23 ACUTE ON CHRONIC SYSTOLIC CONGESTIVE HEART FAILURE (H): Primary | ICD-10-CM

## 2025-06-05 DIAGNOSIS — Z79.899 LONG TERM USE OF DRUG: ICD-10-CM

## 2025-06-05 DIAGNOSIS — Z79.899 ON AMIODARONE THERAPY: ICD-10-CM

## 2025-06-05 DIAGNOSIS — I50.23 ACUTE ON CHRONIC SYSTOLIC CONGESTIVE HEART FAILURE (H): ICD-10-CM

## 2025-06-05 DIAGNOSIS — I50.22 CHRONIC SYSTOLIC HEART FAILURE (H): ICD-10-CM

## 2025-06-05 DIAGNOSIS — Z95.811 LVAD (LEFT VENTRICULAR ASSIST DEVICE) PRESENT (H): ICD-10-CM

## 2025-06-05 DIAGNOSIS — I50.22 CHRONIC SYSTOLIC CONGESTIVE HEART FAILURE (H): ICD-10-CM

## 2025-06-05 LAB
ALBUMIN SERPL BCG-MCNC: 4.5 G/DL (ref 3.5–5.2)
ALP SERPL-CCNC: 74 U/L (ref 40–150)
ALT SERPL W P-5'-P-CCNC: 21 U/L (ref 0–70)
ANION GAP SERPL CALCULATED.3IONS-SCNC: 12 MMOL/L (ref 7–15)
AST SERPL W P-5'-P-CCNC: 30 U/L (ref 0–45)
BILIRUB SERPL-MCNC: 0.3 MG/DL
BUN SERPL-MCNC: 20.4 MG/DL (ref 6–20)
CALCIUM SERPL-MCNC: 9.3 MG/DL (ref 8.8–10.4)
CHLORIDE SERPL-SCNC: 99 MMOL/L (ref 98–107)
CREAT SERPL-MCNC: 1.31 MG/DL (ref 0.67–1.17)
EGFRCR SERPLBLD CKD-EPI 2021: 63 ML/MIN/1.73M2
ERYTHROCYTE [DISTWIDTH] IN BLOOD BY AUTOMATED COUNT: 16.6 % (ref 10–15)
GLUCOSE SERPL-MCNC: 104 MG/DL (ref 70–99)
HCO3 SERPL-SCNC: 26 MMOL/L (ref 22–29)
HCT VFR BLD AUTO: 48.4 % (ref 40–53)
HGB BLD-MCNC: 13.6 G/DL (ref 13.3–17.7)
HGB BLD-MCNC: 14 G/DL (ref 13.3–17.7)
HGB BLD-MCNC: 14.8 G/DL (ref 13.3–17.7)
INR PPP: 2.41 (ref 0.85–1.15)
LDH SERPL L TO P-CCNC: 290 U/L (ref 0–250)
MCH RBC QN AUTO: 27.4 PG (ref 26.5–33)
MCHC RBC AUTO-ENTMCNC: 30.6 G/DL (ref 31.5–36.5)
MCV RBC AUTO: 90 FL (ref 78–100)
NT-PROBNP SERPL-MCNC: 2258 PG/ML (ref 0–177)
OXYHGB MFR BLDA: 60 % (ref 92–100)
OXYHGB MFR BLDA: 97 % (ref 92–100)
PLATELET # BLD AUTO: 217 10E3/UL (ref 150–450)
POTASSIUM SERPL-SCNC: 4.6 MMOL/L (ref 3.4–5.3)
PROT SERPL-MCNC: 7.3 G/DL (ref 6.4–8.3)
PROTHROMBIN TIME: 26.3 SECONDS (ref 11.8–14.8)
RBC # BLD AUTO: 5.4 10E6/UL (ref 4.4–5.9)
SODIUM SERPL-SCNC: 137 MMOL/L (ref 135–145)
TSH SERPL DL<=0.005 MIU/L-ACNC: 3.18 UIU/ML (ref 0.3–4.2)
WBC # BLD AUTO: 9.3 10E3/UL (ref 4–11)

## 2025-06-05 PROCEDURE — 80361 OPIATES 1 OR MORE: CPT | Performed by: INTERNAL MEDICINE

## 2025-06-05 PROCEDURE — 999N000132 HC STATISTIC PP CARE STAGE 1

## 2025-06-05 PROCEDURE — 93451 RIGHT HEART CATH: CPT | Performed by: INTERNAL MEDICINE

## 2025-06-05 PROCEDURE — 84460 ALANINE AMINO (ALT) (SGPT): CPT | Performed by: PHYSICIAN ASSISTANT

## 2025-06-05 PROCEDURE — 250N000009 HC RX 250: Performed by: INTERNAL MEDICINE

## 2025-06-05 PROCEDURE — 83880 ASSAY OF NATRIURETIC PEPTIDE: CPT | Performed by: PHYSICIAN ASSISTANT

## 2025-06-05 PROCEDURE — 85018 HEMOGLOBIN: CPT

## 2025-06-05 PROCEDURE — 84075 ASSAY ALKALINE PHOSPHATASE: CPT | Performed by: PHYSICIAN ASSISTANT

## 2025-06-05 PROCEDURE — 85610 PROTHROMBIN TIME: CPT | Performed by: INTERNAL MEDICINE

## 2025-06-05 PROCEDURE — 80323 ALKALOIDS NOS: CPT | Performed by: INTERNAL MEDICINE

## 2025-06-05 PROCEDURE — 83615 LACTATE (LD) (LDH) ENZYME: CPT | Performed by: PHYSICIAN ASSISTANT

## 2025-06-05 PROCEDURE — 999N000142 HC STATISTIC PROCEDURE PREP ONLY

## 2025-06-05 PROCEDURE — 80307 DRUG TEST PRSMV CHEM ANLYZR: CPT | Performed by: INTERNAL MEDICINE

## 2025-06-05 PROCEDURE — 82810 BLOOD GASES O2 SAT ONLY: CPT

## 2025-06-05 PROCEDURE — 80349 CANNABINOIDS NATURAL: CPT | Performed by: INTERNAL MEDICINE

## 2025-06-05 PROCEDURE — C1751 CATH, INF, PER/CENT/MIDLINE: HCPCS | Performed by: INTERNAL MEDICINE

## 2025-06-05 PROCEDURE — 36415 COLL VENOUS BLD VENIPUNCTURE: CPT | Performed by: INTERNAL MEDICINE

## 2025-06-05 PROCEDURE — 99207 PR NO BILLABLE SERVICE THIS VISIT: CPT | Performed by: NURSE PRACTITIONER

## 2025-06-05 PROCEDURE — 93451 RIGHT HEART CATH: CPT | Mod: 26 | Performed by: INTERNAL MEDICINE

## 2025-06-05 PROCEDURE — 84443 ASSAY THYROID STIM HORMONE: CPT

## 2025-06-05 PROCEDURE — 272N000001 HC OR GENERAL SUPPLY STERILE: Performed by: INTERNAL MEDICINE

## 2025-06-05 PROCEDURE — 85014 HEMATOCRIT: CPT | Performed by: PHYSICIAN ASSISTANT

## 2025-06-05 RX ORDER — LIDOCAINE 40 MG/G
CREAM TOPICAL
Status: COMPLETED | OUTPATIENT
Start: 2025-06-05 | End: 2025-06-05

## 2025-06-05 RX ADMIN — LIDOCAINE: 40 CREAM TOPICAL at 09:32

## 2025-06-05 ASSESSMENT — ACTIVITIES OF DAILY LIVING (ADL)
ADLS_ACUITY_SCORE: 63

## 2025-06-05 NOTE — PROGRESS NOTES
ANTICOAGULATION  MANAGEMENT: Discharge Review    Akshat Fragoso chart reviewed for anticoagulation continuity of care    Outpatient surgery/procedure on 6/5/25 for RHC.    Discharge disposition: Home    Results:    Recent labs: (last 7 days)     05/30/25  0000 06/05/25  0903   INR 2.6 2.41*     Anticoagulation inpatient management:     not applicable     Anticoagulation discharge instructions:     Warfarin dosing: home regimen continued   Bridging: No   INR goal change: No      Medication changes affecting anticoagulation: No    Additional factors affecting anticoagulation: No     PLAN     No adjustment to anticoagulation plan needed    My chart message sent.     Anticoagulation Calendar updated    Fausto NOVOA RN  6/5/2025  Anticoagulation Clinic  [facility] for routing messages: [employer] [facility]  [facility] patient phone line: [phone]

## 2025-06-05 NOTE — DISCHARGE INSTRUCTIONS
[facility]                        Interventional Cardiology  Discharge Instructions   Post Right Heart Cath and/or Heart Biopsy      AFTER YOU GO HOME:  Take it easy for the rest of the day: Do not do strenuous exercise and do not lift, pull, or push anything heavy.  For at least 24 hours, keep the bandage over the spot where the catheter was inserted.   You may shower 24 hours after the procedure. Do not scrub the procedure site vigorously. Pat the incision dry. Do not submerge the site (ie bath, pool) for 48 hours  No lotion or powder to the puncture site for 3 days  You may put an ice or a cold pack on the area for 10 to 20 minutes at a time to help with soreness or swelling.   Resume your regular diet and medications unless otherwise instructed by your Primary Physician    CALL THE PHYSICIAN IF  If you have a fast-growing, painful lump at the catheter site.  If you have symptoms of infection, such as: Increased pain, swelling, warmth, redness, red streaks leading from the area, pus draining from the area, and/or a fever.    ADDITIONAL INSTRUCTIONS: Monitor neck site for bleeding, swelling, or voice changes. If you notice bleeding or swelling immediately apply pressure to the site for 15 minutes, and call number below to speak with Cardiology Fellow.  If you experience any changes in your breathing you should call your doctor immediately or come to the closest Emergency Department.  Do not drive yourself    MEDICATIONS: You are to resume all home medications including anticoagulation therapy unless otherwise advised by your primary cardiologist or nurse coordinator.    Follow Up: Per your primary cardiology team    If you have any questions or concerns regarding your procedure site please call [phone] at any time & press option 4 to speak to the .  Ask for the Cardiology Fellow on call.  They are available 24 hours a day.  You may also contact the Cardiology Clinic after hours  number at [phone].                                                       Telephone Numbers [phone] Monday-Friday 8:00 am to 4:30 pm    [phone] After 4:30 pm Monday-Friday, Weekends & Holidays  Ask for Interventional Cardiologist on call. Someone is on call 24 hours/day   [facility] toll free number [phone] Monday-Friday 8:00 am to 4:30 pm   [facility] Emergency Dept [phone]

## 2025-06-05 NOTE — PROGRESS NOTES
Consenting/Education for Cardiology Procedure: Right heart catheterization     Patient understands we would like to perform the listed procedure(s) due to HF.    The patient understands the following:     The procedure was described to the patient in detail.    No sedation is planned for this procedure. Patient understands risks and complications of the procedure which include but are not limited to bruising/swelling around the incision site, infection, bleeding, allergic reaction to local anesthetic, air embolism, arterial puncture, stroke, heart attack, need for emergency heart surgery, death.       Patient verbalized understanding of risks and benefits and has elected to proceed with the procedure or procedures listed above.    VERONICA Campbell CNP  Cardiology

## 2025-06-05 NOTE — PROGRESS NOTES
Patient arrived via cart from [facility] s/p [facility]. VSS. RIJ site CDI, no hematoma. Discharge instructions reviewed with pt.

## 2025-06-05 NOTE — PROGRESS NOTES
Akshat arrived on 2A for RHC  HM3, numbers WnL  Consent done at bedside  Electronic BP showed map 80

## 2025-06-06 LAB
AMPHETAMINES SERPL QL SCN: NEGATIVE NG/ML
ANNOTATION COMMENT IMP: NORMAL
BARBITURATES SERPL QL SCN: NEGATIVE NG/ML
BENZODIAZ SERPL QL SCN: NEGATIVE NG/ML
BUPRENORPHINE SERPL-MCNC: NEGATIVE NG/ML
CANNABINOIDS SERPL QL SCN: POSITIVE NG/ML
COCAINE SERPL QL SCN: NEGATIVE NG/ML
METHADONE SERPL QL SCN: NEGATIVE NG/ML
METHAMPHET SERPL QL: NEGATIVE NG/ML
OPIATES SERPL QL SCN: NEGATIVE NG/ML
OXYCODONE SERPL QL: POSITIVE NG/ML
PCP SERPL QL SCN: NEGATIVE NG/ML

## 2025-06-09 ENCOUNTER — RESULTS FOLLOW-UP (OUTPATIENT)
Dept: TRANSPLANT | Facility: CLINIC | Age: 60
End: 2025-06-09

## 2025-06-09 LAB
COTININE SERPL-MCNC: <5 NG/ML
NICOTINE SERPL-MCNC: <5 NG/ML

## 2025-06-10 LAB
6MAM SERPL-MCNC: <2 NG/ML
CODEINE SERPL-MCNC: <2 NG/ML
HYDROCODONE SERPL-MCNC: <2 NG/ML
HYDROMORPHONE SERPL-MCNC: <2 NG/ML
MORPHINE SERPL-MCNC: <2 NG/ML
OXYCODONE SERPL-MCNC: 24 NG/ML
OXYMORPHONE SERPL-MCNC: <2 NG/ML

## 2025-06-10 NOTE — PROGRESS NOTES
LVAD Clinic  RABIA follow-up     Akshat Fragoso is a 60 year old male with history of HFrEF 2/2 NICM (diagnosed in 1/2017) s/p ICD placement in 6/2017, VT/VF arrest on 3/15/2023 requiring CPR for 6 mins with cardiogenic shock s/p HM3 LVAD implantation as DT 3/23/2023 c/b postop AF (on amiodarone and digoxin) and HAP, moderate TR s/p TV annuloplasty with 32 mm MC3 partial ring 3/23/2023, PFO closure 3/23/2023, chronic tobacco/marijuana use, COPD, HTN, CKD stage III, who presents today for post LVAD implant follow up.     He was admitted on 3/15/2023 for cardiac arrest in the setting of VT that was slower than his programmed VT threshold for intervention. Patient had been undergoing workup at [place] for LVAD as destination therapy (initially evaluated for transplant though patient having trouble quitting marijuana/tobacco use). He underwent HM3 LVAD implantation on 3/23/2023 with postop course c/b AF with RVR requiring amiodarone gtt, volume overload, and HAP treated with IV vancomycin and zosyn for 5 days. He was discharged to home on 4/9/2023.     He was admitted from 9/3/24-9/6/24 for an inappropriate ICD shock (he had an RV lead dysfunction with oversensince (microperforation). He underwent lead and battery exchange of his device on 9/5/24. He did get a lisinopril washout and was stared on entresto. Otherwise no significant cardiology medication changes.    Today  No le edema. No abdominal edema. Had to take one small dose of lasix last week. No JOHNSON. No orhtopne or PND. He is exercising a lot more.No lightheadedness, dizziness, presyncope or syncope No palpitaitons. No chest pain. No lvad alarms or dips in flow.     No blood in the urine or blood in the stool. No prolonged nosebleeds.      No driveline concerns. No fever or chills.    No headaches or stroke symptoms.     MAP at home have been 70s. Weight      Current cardiac medications   - Lisinopril 10 mg daily  - Inspra 12.5 m gdaily  - Empagliflozin 10 mg daily  -  Digoxin 62.5 MWF, 125 all other days  - lasix 10 mg PRN  - Amiodarone 100 mg daily  - Warfarin (INR goal 2-3)      PAST MEDICAL HISTORY:  Past Medical History:   Diagnosis Date    Acute right lumbar radiculopathy 11/02/2015    Acute systolic heart failure (H) 01/10/2017    Cardiomyopathy (H) 01/10/2017    CKD (chronic kidney disease) stage 3, GFR 30-59 ml/min (H) 01/30/2020    JOHNSON (dyspnea on exertion)     ED (erectile dysfunction) 10/02/2019    Erectile dysfunction     ICD (implantable cardioverter-defibrillator) in place- Protochips, single chamber- NOT dependent 10/09/2017    Personal history of smoking 01/04/2017    Pulmonary nodules 01/17/2017    CT 11/2018:  Bilateral pulmonary nodules measuring up to 4 mm. No new or enlarging pulmonary nodules.     FAMILY HISTORY:  Family History   Problem Relation Age of Onset    Parkinsonism Mother     Atrial fibrillation Father     Heart Failure Father     Prostate Cancer Father     Skin Cancer Father     Anorexia nervosa Daughter     Other - See Comments Granddaughter         premature birth     SOCIAL HISTORY:  Social History     Socioeconomic History    Marital status:      Spouse name: Cheryl    Number of children: 2   Occupational History    Occupation: Construction      Employer: SELF   Tobacco Use    Smoking status: Former     Packs/day: 0.25     Years: 15.00     Pack years: 3.75     Types: Cigarettes    Smokeless tobacco: Former     Quit date: 3/15/2023   Vaping Use    Vaping status: Never Used   Substance and Sexual Activity    Alcohol use: No     Alcohol/week: 0.0 standard drinks of alcohol    Drug use: No    Sexual activity: Yes     Partners: Female     Birth control/protection: Condom   Other Topics Concern    Parent/sibling w/ CABG, MI or angioplasty before 65F 55M? Yes     Comment: both parents had stints placed      CURRENT MEDICATIONS:  Current Outpatient Medications   Medication Sig Dispense Refill    acetaminophen (TYLENOL) 325 MG tablet  Take 2 tablets (650 mg) by mouth every 4 hours as needed for other (For optimal non-opioid multimodal pain management to improve pain control.) 100 tablet 0    amiodarone (PACERONE) 200 MG tablet Take 0.5 tablets (100 mg) by mouth daily. 45 tablet 3    amoxicillin (AMOXIL) 500 MG tablet Take 4 tablets (2,000 mg) by mouth as needed (Take one hour before dental procedure.). 4 tablet 0    digoxin (LANOXIN) 62.5 MCG tablet Take 62.5 mcg (1 tab) Monday, Wednesday, Friday and 125 mcg (2 tabs) every other day of the week 132 tablet 3    empagliflozin (JARDIANCE) 10 MG TABS tablet Take 1 tablet (10 mg) by mouth daily. 90 tablet 3    eplerenone (INSPRA) 25 MG tablet Take 12.5mg (1/2 tab) daily 45 tablet 3    furosemide (LASIX) 20 MG tablet Take 0.5 tablets (10 mg) by mouth as needed (weight gain or shortness of breath). Please call the VAD team before taking this medication 30 tablet 0    JANTOVEN ANTICOAGULANT 5 MG tablet Take 0.5 to 1 tablet by mouth every day OR as directed by Anticoagulation Clinic 90 tablet 1    lisinopril (ZESTRIL) 2.5 MG tablet Take 4 tablets (10 mg) by mouth every morning.      pantoprazole (PROTONIX) 20 MG EC tablet Take 1 tablet (20 mg) by mouth every morning (before breakfast). 90 tablet 3    sildenafil (VIAGRA) 50 MG tablet Take 1 tablet (50 mg) by mouth daily as needed (PRN for sexual activity) 30 tablet 0     No current facility-administered medications for this visit.     ROS:   See HPI     EXAM:  BP (!) 90/0 (BP Location: Right arm, Patient Position: Chair, Cuff Size: Adult Regular)   Pulse 71   Wt 83.9 kg (185 lb)   SpO2 96%   BMI 28.98 kg/m    GENERAL: Appears comfortable, in no acute distress.   HEENT: Eye symmetrical  CV: Hum of Hm3, no adventitious sounds. JVP <6.   RESPIRATORY: Respirations regular, even, and unlabored. Lungs CTA throughout.   GI: Soft  and non distended.   EXTREMITIES: No peripheral edema. All extremities are warm and well perfused  NEUROLOGIC: Alert and  interacting appropriately. No focal deficits.   SKIN: No jaundice.  Driveline site c/d/i.       Labs:  Lab Results   Component Value Date    WBC 9.3 06/05/2025    HGB 14.0 06/05/2025    HCT 48.4 06/05/2025     06/05/2025     06/05/2025    POTASSIUM 4.6 06/05/2025    CHLORIDE 99 06/05/2025    CO2 26 06/05/2025    BUN 20.4 (H) 06/05/2025    CR 1.31 (H) 06/05/2025     (H) 06/05/2025    DD 3.60 (H) 04/18/2023    NTBNPI 2,071 (H) 09/03/2024    NTBNP 2,258 (H) 06/05/2025    TROPI 0.033 01/09/2017    AST 30 06/05/2025    ALT 21 06/05/2025    ALKPHOS 74 06/05/2025    BILITOTAL 0.3 06/05/2025    INR 2.41 (H) 06/05/2025     Diagnostics:   ICD check   Device: Biomonde [id number] [place] XT VR MRI  Normal device function.  Mode: VVI 40 bpm  : 0.2%  Intrinsic rhythm: VS 83 bpm, regular rhythm  Thoracic Impedance:  Near reference line.   Short V-V intervals: 0  Lead Trends Appear Stable.  Estimated battery longevity to RRT = 13.7 years. Battery voltage = 3.06 V.   Atrial Arrhythmia: None  AF [place]: 0%  Anticoagulant: Warfarin  Ventricular Arrhythmia: None  Setting Changes: None  Patient has an appointment to see Elba MARTIN today.      Plan: Device follow-up every 3 months.  Yumiko Antonio RN     Single lead ICD    6/5/25 RHC  RA 8 mmHg  RV 33/9  PA 39/23/30   PCW 11    TD CO 4.3 L.min (2.2 L/min/m2)  Carlos CO 4.2 L/min (2.1 L/min/m2)    PVR 4.6 hybrid units Right sided filling pressures are normal. Left sided filling pressures are normal. Mild elevated pulmonary hypertension. Normal cardiac output level.       3/4/25 RHC    Systolic (mmHg) Diastolic (mmHg) Mean (mmHg) A Wave (mmHg) V Wave (mmHg) EDP (mmHg) Max dp/dt (mmHg/sec) HR (bpm) Content (mL/dL) SAT (%)     RA Pressures 12:07 PM   11    15    15      76        RV Pressures 11:53 AM       816          12:08 PM 42        11     71        PA Pressures 12:10 PM 42    23    31        67        PCW Pressures 12:09 PM   14    16    18      77         AO Pressures 11:56     64    78        91           Time Results Indexed Values (L/min/m2)   QP 11:53 AM 3.91 L/min    1.96      QS 11:53 AM 3.91 L/min    1.96          3/4/25 ECHO  Interpretation Summary  HM3 LVAD at 5100 RPM.  Left ventricular function is decreased. The ejection fraction is 10-20%  (severely reduced).  Moderate left ventricular dilation is present. LVIDd:6.4 cm  Global right ventricular function is moderately reduced.  Normal Doppler interrogation of the LVADoutflow graft.  s/p TV annuloplasty with 32 mm MC3 partial ring  The inferior vena cava was normal in size with preserved respiratory  variability.  No significant valvular abnormalities present.     This study was compared with the study from 9/5/2024 .  No significant changes noted.    9/5/24 ECHO  Interpretation Summary  The visual ejection fraction is <20%. Severe diffuse hypokinesis is present.  Dilation of the inferior vena cava is present with normal respiratory  variation in diameter. IVC diameter and respiratory changes fall into an  intermediate range suggesting an RA pressure of 8 mmHg.  No pericardial effusion is present.     This study was compared with the study from 12/27/23 .  No significant changes noted.    4/1/24 RHC  RA: --/--/15 mm Hg  RV: 60/15 mm Hg  PA: 60/30 (43) mm Hg  PCWP: --/--/24 mm Hg  CO/CI: 3.6/1.8 (TD); 3.3/1.7 (Carlos)   PVR: 5.2 (TD) LING   Right sided filling pressures are moderately elevated. Left sided filling pressures are moderately elevated. Moderately elevated pulmonary artery hypertension. Reduced cardiac output level. Hemodynamic data has been modified in [facility] per physician review.        December 27, 2023 echo  Borderline-dilated LV with severely reduced global LV function, LVEF<20%.  LVIDd=5/8 cm.  RV function appears moderately reduced on limited views.  The aortic valve appears to remains closed on limited views. There is no AI.  LVAD inflow cannula is visualized in the LV apex. LVAD outflow  graft is  visualized in the aorta. Normal Doppler interrogation of the LVAD inflow  cannula and outflow graft.  This study was compared with the study from 5/3/23: No significant changes  noted.  ___________    8/7/23 RHC  RA 8  RV 39/8  PA 39/15/24  [place] 7  Cardiac output 4.3, cardiac index 2.3 by thermo  Cardiac output 3,   cardiac index 1.59 by Carlos  PVR: 2 Right sided filling pressures are normal. Left sided filling pressures are normal. Mild elevated pulmonary hypertension. Normal cardiac output level.     Pressures Phase: Baseline     Time Systolic (mmHg) Diastolic (mmHg) Mean (mmHg) A Wave (mmHg) V Wave (mmHg) EDP (mmHg) Max dp/dt (mmHg/sec) HR (bpm) Content (mL/dL) SAT (%)   RA Pressures 12:09 PM   8    9    13      75        RV Pressures 12:15 PM 39        9     74        PA Pressures 12:16 PM 39    15    24        74        PCW Pressures 12:15 PM   7        75        Art Pressures 11:59 AM 99    75    83        77          Blood Flow Results Phase: Baseline     Time Results Indexed Values (L/min/m2)   QP 11:55 AM 3.07 L/min    1.59      QS 11:55 AM 3.07 L/min    1.59        Blood Oximetry Phase: Baseline       Time Hb SAT(%) PO2 Content (mL/dL) PA Sat (%)   PA 11:55 AM  59 %      59      Art 11:55 AM  100 %     19.18              TTE 3/28/2023  Technically difficult study.Extremely poor acoustic windows.  Limited information was obtained during study.  LVAD cannula was seen in the expected anatomic position in the LV apex.  HM3 at 5200RPM.  Septum normal.  LVIDd 56mm.  Aortic valve not well visualized. No AI.  Normal outflow velocity.  Global right ventricular function is moderately to severely reduced.  Small pericardial effusion with organizing material in pericardial cavity.     TTE 5/3/23:  Please refer to the EPIC report for measurements performed at different LVAD speed settings.  HM3 at 5200RPM at baseline.  LVIDd 43mm.  Septum normal.  Aortic valve remain closed at baseline.     Assessment and  Plan:   Akshat Fragoso is a 60 year old male with history of HFrEF 2/2 NICM (diagnosed in 1/2017) s/p ICD placement in 6/2017, VT/VF arrest on 3/15/2023 requiring CPR for 6 mins with cardiogenic shock s/p HM3 LVAD implantation as DT 3/23/2023 c/b postop AF (on amiodarone and digoxin) and HAP, moderate TR s/p TV annuloplasty with 32 mm MC3 partial ring 3/23/2023, PFO closure 3/23/2023, chronic tobacco/marijuana use, COPD, HTN, CKD stage III, who presents today for post LVAD implant follow up.    He has an abcsessed tooth currently an says he took an old oxy pill from an old prescription. Last presiption was from September and only 5 tabs but says he was able to add it to even an older prescription. No oxycodone prescriptions since September- at this point we discussed the importance of only taking oxycodone if prescribed by a provider and shouldn't be using old tabs. Explained the importance of having a clear prescription record and understanding of where the oxycodone comes from in order to move forward with transplant listing. He understands. At this point it is not totally clear to me if the amount of positive oxycodone tests is explainable by his prior prescriptions as it has been now a really long time since he got a sizeable oxycodone prescription- we will need to continue to trend and assess with him. If we are seeing ongoing positives, then would recommend addiciton medicine again as its not totally clear how he is having access this oxycodone based on current information available in the [facility] database which I did review today.    Labs for this appointment were drawn on 6/5 and reviewed today. Electrolytes stable. Cr stable at 1.31. LFTs stable. TSH WNL. WBC normal. Hgb stable. Okay to recheck all in 3 months. INR at goal - next recheck per ACC clinic    # Chronic systolic heart failure secondary to NICM with cardiogenic shock s/p HM III on 3/23/23 LVAD. ACC/AHA Stage D, NYHA Class III  Moderate TR s/p TV  annuloplasty with 32 mm MC3 partial ring 3/23/2023.    Fluid status lasix 10 mg PRN as above, he will call us if he feels he needs it.   ACEi/ARB Continue lisinopril 10 mg daily. We have discussed making the change to entresto many times, for now, we are just going to remain on lisinopril and take entresto off the med list to avoid confusion. We can continue to consider this in the future  BB could consider in the future, deferred given other upcoming changes as above  Aldosterone antagonist Continue Inspra 12.5 mg daily  SGLT2i: Continue Jardiance 10 mg dialy  SCD prophylaxis ICD  NSAID use: contraindicated  Sleep Apnea Evaluation: not discussed today  BP: MAP goal 65-85, was 96 -> 90 -> 86  LDH trends: 290, stable  Anticoagulation: warfarin INR goal 2-3, today 2.41, dosing per ACC clinic  Antiplatelet: Off ASA  Transplant considerations- had previously been limited by tobacco/marijuana, off tobacco and there have been programmatic changes around marijuana, although would still need to stop based on current protocol. Testing positive for oxycodone without active rx for that (States he is using old prescriptions)- see conversation above. Would hold off on eval until we are getting negative tests.    VAD Interrogation on June 11, 2025 Interrogation reviewed with VAD coordinator. Agree with findings. Occasional PI events. No power spikes, speed drops, or other findings suspicious of pump malfunction noted.       # Persistent lukocytosis.   ID noted no indication for long term antibiotics inpatient given no acute findings on CT while inpatient (he had been treated for HAP with IV antibiotics while inpatient). No signs or symptoms of infection today.  He did have a pre-op luekocytosis longstanding around 11-12,   - WBC WNL today    # Afib with RVR, resolved.   - Continue Amiodarone and Digoxin.  - Warfarin with INR goal 2-3, dosing per ACC clinic    # H/o VT/VF arrest  # H/o ICD shocks, inappropriate d/t RV lead  oversensing. He did have  ICD removal and reimplant of medtronic system with lead revision.  - Follows with EP  - Continue amiodarone 100 mg daily, monitoring per EP- TSH WNL, LFTS WNL  - ICD checks per protocol- today's check without any noteable events    # Moderate TR s/p TV annuloplasty with 32 mm MC3 partial ring 3/23/2023  PFO closure 3/23/2023  - Trend on ECHOs    # Chronic tobacco/marijuana use  # Chronic oxycodone use  - Has been off tobacco since Jan 2023  - Still using cannibis  - Using oxycodone, reports this is from old prescriptions. Discussed the importance of not doing that/having provider recommendations for use and how important that is for transplant eval- would reassess transplant eval when getting negative testing    Follow-up:  - RTC in 3 months     Billing  - I managed 2 stable chronic conditions  - I reviewed 4 tests    The longitudinal plan of care for the diagnosis(es)/condition(s) as documented were addressed during this visit. Due to the added complexity in care, I will continue to support Akshat in the subsequent management and with ongoing continuity of care.    Elba Chen PA-C  Advacned Heart Failure  [facility]

## 2025-06-11 ENCOUNTER — ANCILLARY PROCEDURE (OUTPATIENT)
Dept: CARDIOLOGY | Facility: CLINIC | Age: 60
End: 2025-06-11
Attending: INTERNAL MEDICINE
Payer: COMMERCIAL

## 2025-06-11 ENCOUNTER — OFFICE VISIT (OUTPATIENT)
Dept: CARDIOLOGY | Facility: CLINIC | Age: 60
End: 2025-06-11
Attending: PHYSICIAN ASSISTANT
Payer: COMMERCIAL

## 2025-06-11 VITALS
WEIGHT: 185 LBS | OXYGEN SATURATION: 96 % | HEART RATE: 71 BPM | SYSTOLIC BLOOD PRESSURE: 86 MMHG | BODY MASS INDEX: 28.98 KG/M2

## 2025-06-11 DIAGNOSIS — I42.9 CARDIOMYOPATHY (H): ICD-10-CM

## 2025-06-11 DIAGNOSIS — I50.22 CHRONIC SYSTOLIC CONGESTIVE HEART FAILURE (H): ICD-10-CM

## 2025-06-11 DIAGNOSIS — I50.22 CHRONIC SYSTOLIC HEART FAILURE (H): ICD-10-CM

## 2025-06-11 DIAGNOSIS — Z95.810 ICD (IMPLANTABLE CARDIOVERTER-DEFIBRILLATOR) IN PLACE: ICD-10-CM

## 2025-06-11 DIAGNOSIS — Z95.811 LVAD (LEFT VENTRICULAR ASSIST DEVICE) PRESENT (H): ICD-10-CM

## 2025-06-11 LAB
CANNABINOIDS SERPL-MCNC: 58 NG/ML
MDC_IDC_LEAD_CONNECTION_STATUS: NORMAL
MDC_IDC_LEAD_IMPLANT_DT: NORMAL
MDC_IDC_LEAD_LOCATION: NORMAL
MDC_IDC_LEAD_LOCATION_DETAIL_1: NORMAL
MDC_IDC_LEAD_MFG: NORMAL
MDC_IDC_LEAD_MODEL: NORMAL
MDC_IDC_LEAD_POLARITY_TYPE: NORMAL
MDC_IDC_LEAD_SERIAL: NORMAL
MDC_IDC_LEAD_SPECIAL_FUNCTION: NORMAL
MDC_IDC_MSMT_BATTERY_DTM: NORMAL
MDC_IDC_MSMT_BATTERY_REMAINING_LONGEVITY: 163 MO
MDC_IDC_MSMT_BATTERY_RRT_TRIGGER: NORMAL
MDC_IDC_MSMT_BATTERY_STATUS: NORMAL
MDC_IDC_MSMT_BATTERY_VOLTAGE: 3.06 V
MDC_IDC_MSMT_CAP_CHARGE_DTM: NORMAL
MDC_IDC_MSMT_CAP_CHARGE_ENERGY: 18 J
MDC_IDC_MSMT_CAP_CHARGE_TIME: 3.5 S
MDC_IDC_MSMT_CAP_CHARGE_TYPE: NORMAL
MDC_IDC_MSMT_LEADCHNL_RV_IMPEDANCE_VALUE: 285 OHM
MDC_IDC_MSMT_LEADCHNL_RV_IMPEDANCE_VALUE: 418 OHM
MDC_IDC_MSMT_LEADCHNL_RV_PACING_THRESHOLD_AMPLITUDE: 1 V
MDC_IDC_MSMT_LEADCHNL_RV_PACING_THRESHOLD_PULSEWIDTH: 0.4 MS
MDC_IDC_MSMT_LEADCHNL_RV_SENSING_INTR_AMPL: 14.6 MV
MDC_IDC_PG_IMPLANT_DTM: NORMAL
MDC_IDC_PG_MFG: NORMAL
MDC_IDC_PG_MODEL: NORMAL
MDC_IDC_PG_SERIAL: NORMAL
MDC_IDC_PG_TYPE: NORMAL
MDC_IDC_SESS_CLINIC_NAME: NORMAL
MDC_IDC_SESS_DTM: NORMAL
MDC_IDC_SESS_TYPE: NORMAL
MDC_IDC_SET_BRADY_HYSTRATE: NORMAL
MDC_IDC_SET_BRADY_LOWRATE: 40 {BEATS}/MIN
MDC_IDC_SET_BRADY_MODE: NORMAL
MDC_IDC_SET_LEADCHNL_RA_SENSING_SENSITIVITY: NORMAL
MDC_IDC_SET_LEADCHNL_RV_PACING_AMPLITUDE: 2 V
MDC_IDC_SET_LEADCHNL_RV_PACING_ANODE_ELECTRODE_1: NORMAL
MDC_IDC_SET_LEADCHNL_RV_PACING_ANODE_LOCATION_1: NORMAL
MDC_IDC_SET_LEADCHNL_RV_PACING_CAPTURE_MODE: NORMAL
MDC_IDC_SET_LEADCHNL_RV_PACING_CATHODE_ELECTRODE_1: NORMAL
MDC_IDC_SET_LEADCHNL_RV_PACING_CATHODE_LOCATION_1: NORMAL
MDC_IDC_SET_LEADCHNL_RV_PACING_POLARITY: NORMAL
MDC_IDC_SET_LEADCHNL_RV_PACING_PULSEWIDTH: 0.4 MS
MDC_IDC_SET_LEADCHNL_RV_SENSING_ANODE_ELECTRODE_1: NORMAL
MDC_IDC_SET_LEADCHNL_RV_SENSING_ANODE_LOCATION_1: NORMAL
MDC_IDC_SET_LEADCHNL_RV_SENSING_CATHODE_ELECTRODE_1: NORMAL
MDC_IDC_SET_LEADCHNL_RV_SENSING_CATHODE_LOCATION_1: NORMAL
MDC_IDC_SET_LEADCHNL_RV_SENSING_POLARITY: NORMAL
MDC_IDC_SET_LEADCHNL_RV_SENSING_SENSITIVITY: 0.45 MV
MDC_IDC_SET_ZONE_DETECTION_BEATS_DENOMINATOR: 16 {BEATS}
MDC_IDC_SET_ZONE_DETECTION_BEATS_DENOMINATOR: 32 {BEATS}
MDC_IDC_SET_ZONE_DETECTION_BEATS_DENOMINATOR: 40 {BEATS}
MDC_IDC_SET_ZONE_DETECTION_BEATS_NUMERATOR: 16 {BEATS}
MDC_IDC_SET_ZONE_DETECTION_BEATS_NUMERATOR: 30 {BEATS}
MDC_IDC_SET_ZONE_DETECTION_BEATS_NUMERATOR: 32 {BEATS}
MDC_IDC_SET_ZONE_DETECTION_INTERVAL: 240 MS
MDC_IDC_SET_ZONE_DETECTION_INTERVAL: 280 MS
MDC_IDC_SET_ZONE_DETECTION_INTERVAL: 330 MS
MDC_IDC_SET_ZONE_DETECTION_INTERVAL: 450 MS
MDC_IDC_SET_ZONE_STATUS: NORMAL
MDC_IDC_SET_ZONE_TYPE: NORMAL
MDC_IDC_SET_ZONE_VENDOR_TYPE: NORMAL
MDC_IDC_STAT_AT_BURDEN_PERCENT: 0 %
MDC_IDC_STAT_AT_DTM_END: NORMAL
MDC_IDC_STAT_AT_DTM_START: NORMAL
MDC_IDC_STAT_BRADY_DTM_END: NORMAL
MDC_IDC_STAT_BRADY_DTM_START: NORMAL
MDC_IDC_STAT_BRADY_RA_PERCENT_PACED: NORMAL
MDC_IDC_STAT_BRADY_RV_PERCENT_PACED: 0.22 %
MDC_IDC_STAT_EPISODE_RECENT_COUNT: 0
MDC_IDC_STAT_EPISODE_RECENT_COUNT_DTM_END: NORMAL
MDC_IDC_STAT_EPISODE_RECENT_COUNT_DTM_START: NORMAL
MDC_IDC_STAT_EPISODE_TOTAL_COUNT: 0
MDC_IDC_STAT_EPISODE_TOTAL_COUNT: 3
MDC_IDC_STAT_EPISODE_TOTAL_COUNT_DTM_END: NORMAL
MDC_IDC_STAT_EPISODE_TOTAL_COUNT_DTM_START: NORMAL
MDC_IDC_STAT_EPISODE_TYPE: NORMAL
MDC_IDC_STAT_TACHYTHERAPY_ATP_DELIVERED_RECENT: 0
MDC_IDC_STAT_TACHYTHERAPY_ATP_DELIVERED_TOTAL: 0
MDC_IDC_STAT_TACHYTHERAPY_RECENT_DTM_END: NORMAL
MDC_IDC_STAT_TACHYTHERAPY_RECENT_DTM_START: NORMAL
MDC_IDC_STAT_TACHYTHERAPY_SHOCKS_ABORTED_RECENT: 0
MDC_IDC_STAT_TACHYTHERAPY_SHOCKS_ABORTED_TOTAL: 0
MDC_IDC_STAT_TACHYTHERAPY_SHOCKS_DELIVERED_RECENT: 0
MDC_IDC_STAT_TACHYTHERAPY_SHOCKS_DELIVERED_TOTAL: 0
MDC_IDC_STAT_TACHYTHERAPY_TOTAL_DTM_END: NORMAL
MDC_IDC_STAT_TACHYTHERAPY_TOTAL_DTM_START: NORMAL

## 2025-06-11 PROCEDURE — 99213 OFFICE O/P EST LOW 20 MIN: CPT | Performed by: PHYSICIAN ASSISTANT

## 2025-06-11 PROCEDURE — 93750 INTERROGATION VAD IN PERSON: CPT | Performed by: PHYSICIAN ASSISTANT

## 2025-06-11 PROCEDURE — 93282 PRGRMG EVAL IMPLANTABLE DFB: CPT | Performed by: INTERNAL MEDICINE

## 2025-06-11 RX ORDER — DIGOXIN 0.06 MG/1
TABLET ORAL
Qty: 132 TABLET | Refills: 3 | Status: SHIPPED | OUTPATIENT
Start: 2025-06-11

## 2025-06-11 RX ORDER — LISINOPRIL 2.5 MG/1
10 TABLET ORAL EVERY MORNING
Status: SHIPPED
Start: 2025-06-11

## 2025-06-11 ASSESSMENT — PAIN SCALES - GENERAL: PAINLEVEL_OUTOF10: NO PAIN (0)

## 2025-06-11 NOTE — PATIENT INSTRUCTIONS
It was a pleasure to see you in clinic today, please do not hesitate to call us for questions or concerns.  Your next automatic remote ICD check from home is scheduled for 9/12/2025.    Yumiko Antonio RN    Electrophysiology Nurse Clinician  [facility]    During Business Hours Please Call:  [phone]  After Hours Please Call:  [phone] - select option #4 and ask for job code [id number]

## 2025-06-11 NOTE — LETTER
6/11/2025      RE: Akshat Fragoso  [address]  [facility] [address]       Dear Colleague,    Thank you for the opportunity to participate in the care of your patient, Akshat Fragoso, at the [facility] [place] [facility]. Please see a copy of my visit note below.    LVAD Clinic  RABIA follow-up     Akshat Fragoso is a 60 year old male with history of HFrEF 2/2 NICM (diagnosed in 1/2017) s/p ICD placement in 6/2017, VT/VF arrest on 3/15/2023 requiring CPR for 6 mins with cardiogenic shock s/p HM3 LVAD implantation as DT 3/23/2023 c/b postop AF (on amiodarone and digoxin) and HAP, moderate TR s/p TV annuloplasty with 32 mm MC3 partial ring 3/23/2023, PFO closure 3/23/2023, chronic tobacco/marijuana use, COPD, HTN, CKD stage III, who presents today for post LVAD implant follow up.     He was admitted on 3/15/2023 for cardiac arrest in the setting of VT that was slower than his programmed VT threshold for intervention. Patient had been undergoing workup at [place] for LVAD as destination therapy (initially evaluated for transplant though patient having trouble quitting marijuana/tobacco use). He underwent HM3 LVAD implantation on 3/23/2023 with postop course c/b AF with RVR requiring amiodarone gtt, volume overload, and HAP treated with IV vancomycin and zosyn for 5 days. He was discharged to home on 4/9/2023.     He was admitted from 9/3/24-9/6/24 for an inappropriate ICD shock (he had an RV lead dysfunction with oversensince (microperforation). He underwent lead and battery exchange of his device on 9/5/24. He did get a lisinopril washout and was stared on entresto. Otherwise no significant cardiology medication changes.    Today  No le edema. No abdominal edema. Had to take one small dose of lasix last week. No JOHNSON. No orhtopne or PND. He is exercising a lot more.No lightheadedness, dizziness, presyncope or syncope No palpitaitons. No chest pain. No lvad  alarms or dips in flow.     No blood in the urine or blood in the stool. No prolonged nosebleeds.      No driveline concerns. No fever or chills.    No headaches or stroke symptoms.     MAP at home have been 70s. Weight      Current cardiac medications   - Lisinopril 10 mg daily  - Inspra 12.5 m gdaily  - Empagliflozin 10 mg daily  - Digoxin 62.5 MWF, 125 all other days  - lasix 10 mg PRN  - Amiodarone 100 mg daily  - Warfarin (INR goal 2-3)      PAST MEDICAL HISTORY:  Past Medical History:   Diagnosis Date     Acute right lumbar radiculopathy 11/02/2015     Acute systolic heart failure (H) 01/10/2017     Cardiomyopathy (H) 01/10/2017     CKD (chronic kidney disease) stage 3, GFR 30-59 ml/min (H) 01/30/2020     JOHNSON (dyspnea on exertion)      ED (erectile dysfunction) 10/02/2019     Erectile dysfunction      ICD (implantable cardioverter-defibrillator) in place- [facility], single chamber- NOT dependent 10/09/2017     Personal history of smoking 01/04/2017     Pulmonary nodules 01/17/2017    CT 11/2018:  Bilateral pulmonary nodules measuring up to 4 mm. No new or enlarging pulmonary nodules.     FAMILY HISTORY:  Family History   Problem Relation Age of Onset     Parkinsonism Mother      Atrial fibrillation Father      Heart Failure Father      Prostate Cancer Father      Skin Cancer Father      Anorexia nervosa Daughter      Other - See Comments Granddaughter         premature birth     SOCIAL HISTORY:  Social History     Socioeconomic History     Marital status:      Spouse name: Cheryl     Number of children: 2   Occupational History     Occupation: Construction      Employer: SELF   Tobacco Use     Smoking status: Former     Packs/day: 0.25     Years: 15.00     Pack years: 3.75     Types: Cigarettes     Smokeless tobacco: Former     Quit date: 3/15/2023   Vaping Use     Vaping status: Never Used   Substance and Sexual Activity     Alcohol use: No     Alcohol/week: 0.0 standard drinks of alcohol      Drug use: No     Sexual activity: Yes     Partners: Female     Birth control/protection: Condom   Other Topics Concern     Parent/sibling w/ CABG, MI or angioplasty before 65F 55M? Yes     Comment: both parents had stints placed      CURRENT MEDICATIONS:  Current Outpatient Medications   Medication Sig Dispense Refill     acetaminophen (TYLENOL) 325 MG tablet Take 2 tablets (650 mg) by mouth every 4 hours as needed for other (For optimal non-opioid multimodal pain management to improve pain control.) 100 tablet 0     amiodarone (PACERONE) 200 MG tablet Take 0.5 tablets (100 mg) by mouth daily. 45 tablet 3     amoxicillin (AMOXIL) 500 MG tablet Take 4 tablets (2,000 mg) by mouth as needed (Take one hour before dental procedure.). 4 tablet 0     digoxin (LANOXIN) 62.5 MCG tablet Take 62.5 mcg (1 tab) Monday, Wednesday, Friday and 125 mcg (2 tabs) every other day of the week 132 tablet 3     empagliflozin (JARDIANCE) 10 MG TABS tablet Take 1 tablet (10 mg) by mouth daily. 90 tablet 3     eplerenone (INSPRA) 25 MG tablet Take 12.5mg (1/2 tab) daily 45 tablet 3     furosemide (LASIX) 20 MG tablet Take 0.5 tablets (10 mg) by mouth as needed (weight gain or shortness of breath). Please call the VAD team before taking this medication 30 tablet 0     JANTOVEN ANTICOAGULANT 5 MG tablet Take 0.5 to 1 tablet by mouth every day OR as directed by Anticoagulation Clinic 90 tablet 1     lisinopril (ZESTRIL) 2.5 MG tablet Take 4 tablets (10 mg) by mouth every morning.       pantoprazole (PROTONIX) 20 MG EC tablet Take 1 tablet (20 mg) by mouth every morning (before breakfast). 90 tablet 3     sildenafil (VIAGRA) 50 MG tablet Take 1 tablet (50 mg) by mouth daily as needed (PRN for sexual activity) 30 tablet 0     No current facility-administered medications for this visit.     ROS:   See HPI     EXAM:  BP (!) 90/0 (BP Location: Right arm, Patient Position: Chair, Cuff Size: Adult Regular)   Pulse 71   Wt 83.9 kg (185 lb)    SpO2 96%   BMI 28.98 kg/m    GENERAL: Appears comfortable, in no acute distress.   HEENT: Eye symmetrical  CV: Hum of Hm3, no adventitious sounds. JVP <6.   RESPIRATORY: Respirations regular, even, and unlabored. Lungs CTA throughout.   GI: Soft  and non distended.   EXTREMITIES: No peripheral edema. All extremities are warm and well perfused  NEUROLOGIC: Alert and interacting appropriately. No focal deficits.   SKIN: No jaundice.  Driveline site c/d/i.       Labs:  Lab Results   Component Value Date    WBC 9.3 06/05/2025    HGB 14.0 06/05/2025    HCT 48.4 06/05/2025     06/05/2025     06/05/2025    POTASSIUM 4.6 06/05/2025    CHLORIDE 99 06/05/2025    CO2 26 06/05/2025    BUN 20.4 (H) 06/05/2025    CR 1.31 (H) 06/05/2025     (H) 06/05/2025    DD 3.60 (H) 04/18/2023    NTBNPI 2,071 (H) 09/03/2024    NTBNP 2,258 (H) 06/05/2025    TROPI 0.033 01/09/2017    AST 30 06/05/2025    ALT 21 06/05/2025    ALKPHOS 74 06/05/2025    BILITOTAL 0.3 06/05/2025    INR 2.41 (H) 06/05/2025     Diagnostics:   ICD check   Device: Medtronic [id number] [place] XT VR MRI  Normal device function.  Mode: VVI 40 bpm  : 0.2%  Intrinsic rhythm: VS 83 bpm, regular rhythm  Thoracic Impedance:  Near reference line.   Short V-V intervals: 0  Lead Trends Appear Stable.  Estimated battery longevity to RRT = 13.7 years. Battery voltage = 3.06 V.   Atrial Arrhythmia: None  AF [place]: 0%  Anticoagulant: Warfarin  Ventricular Arrhythmia: None  Setting Changes: None  Patient has an appointment to see Elba MARTIN today.      Plan: Device follow-up every 3 months.  Yumiko Antonio RN     Single lead ICD    6/5/25 RHC  RA 8 mmHg  RV 33/9  PA 39/23/30   PCW 11    TD CO 4.3 L.min (2.2 L/min/m2)  Carlos CO 4.2 L/min (2.1 L/min/m2)    PVR 4.6 hybrid units Right sided filling pressures are normal. Left sided filling pressures are normal. Mild elevated pulmonary hypertension. Normal cardiac output level.       3/4/25 RHC    Systolic  (mmHg) Diastolic (mmHg) Mean (mmHg) A Wave (mmHg) V Wave (mmHg) EDP (mmHg) Max dp/dt (mmHg/sec) HR (bpm) Content (mL/dL) SAT (%)     RA Pressures 12:07 PM   11    15    15      76        RV Pressures 11:53 AM       816          12:08 PM 42        11     71        PA Pressures 12:10 PM 42    23    31        67        PCW Pressures 12:09 PM   14    16    18      77        AO Pressures 11:56     64    78        91           Time Results Indexed Values (L/min/m2)   QP 11:53 AM 3.91 L/min    1.96      QS 11:53 AM 3.91 L/min    1.96          3/4/25 ECHO  Interpretation Summary  HM3 LVAD at 5100 RPM.  Left ventricular function is decreased. The ejection fraction is 10-20%  (severely reduced).  Moderate left ventricular dilation is present. LVIDd:6.4 cm  Global right ventricular function is moderately reduced.  Normal Doppler interrogation of the LVADoutflow graft.  s/p TV annuloplasty with 32 mm MC3 partial ring  The inferior vena cava was normal in size with preserved respiratory  variability.  No significant valvular abnormalities present.     This study was compared with the study from 9/5/2024 .  No significant changes noted.    9/5/24 ECHO  Interpretation Summary  The visual ejection fraction is <20%. Severe diffuse hypokinesis is present.  Dilation of the inferior vena cava is present with normal respiratory  variation in diameter. IVC diameter and respiratory changes fall into an  intermediate range suggesting an RA pressure of 8 mmHg.  No pericardial effusion is present.     This study was compared with the study from 12/27/23 .  No significant changes noted.    4/1/24 RHC  RA: --/--/15 mm Hg  RV: 60/15 mm Hg  PA: 60/30 (43) mm Hg  PCWP: --/--/24 mm Hg  CO/CI: 3.6/1.8 (TD); 3.3/1.7 (Carlos)   PVR: 5.2 (TD) LING   Right sided filling pressures are moderately elevated. Left sided filling pressures are moderately elevated. Moderately elevated pulmonary artery hypertension. Reduced cardiac output level. Hemodynamic  data has been modified in [facility] per physician review.        December 27, 2023 echo  Borderline-dilated LV with severely reduced global LV function, LVEF<20%.  LVIDd=5/8 cm.  RV function appears moderately reduced on limited views.  The aortic valve appears to remains closed on limited views. There is no AI.  LVAD inflow cannula is visualized in the LV apex. LVAD outflow graft is  visualized in the aorta. Normal Doppler interrogation of the LVAD inflow  cannula and outflow graft.  This study was compared with the study from 5/3/23: No significant changes  noted.  ___________    8/7/23 RHC  RA 8  RV 39/8  PA 39/15/24  [place]edge 7  Cardiac output 4.3, cardiac index 2.3 by thermo  Cardiac output 3,   cardiac index 1.59 by Carlos  PVR: 2 Right sided filling pressures are normal. Left sided filling pressures are normal. Mild elevated pulmonary hypertension. Normal cardiac output level.     Pressures Phase: Baseline     Time Systolic (mmHg) Diastolic (mmHg) Mean (mmHg) A Wave (mmHg) V Wave (mmHg) EDP (mmHg) Max dp/dt (mmHg/sec) HR (bpm) Content (mL/dL) SAT (%)   RA Pressures 12:09 PM   8    9    13      75        RV Pressures 12:15 PM 39        9     74        PA Pressures 12:16 PM 39    15    24        74        PCW Pressures 12:15 PM   7        75        Art Pressures 11:59 AM 99    75    83        77          Blood Flow Results Phase: Baseline     Time Results Indexed Values (L/min/m2)   QP 11:55 AM 3.07 L/min    1.59      QS 11:55 AM 3.07 L/min    1.59        Blood Oximetry Phase: Baseline       Time Hb SAT(%) PO2 Content (mL/dL) PA Sat (%)   PA 11:55 AM  59 %      59      Art 11:55 AM  100 %     19.18              TTE 3/28/2023  Technically difficult study.Extremely poor acoustic windows.  Limited information was obtained during study.  LVAD cannula was seen in the expected anatomic position in the LV apex.  HM3 at 5200RPM.  Septum normal.  LVIDd 56mm.  Aortic valve not well visualized. No AI.  Normal outflow  velocity.  Global right ventricular function is moderately to severely reduced.  Small pericardial effusion with organizing material in pericardial cavity.     TTE 5/3/23:  Please refer to the EPIC report for measurements performed at different LVAD speed settings.  HM3 at 5200RPM at baseline.  LVIDd 43mm.  Septum normal.  Aortic valve remain closed at baseline.     Assessment and Plan:   kAshat Fragoso is a 60 year old male with history of HFrEF 2/2 NICM (diagnosed in 1/2017) s/p ICD placement in 6/2017, VT/VF arrest on 3/15/2023 requiring CPR for 6 mins with cardiogenic shock s/p HM3 LVAD implantation as DT 3/23/2023 c/b postop AF (on amiodarone and digoxin) and HAP, moderate TR s/p TV annuloplasty with 32 mm MC3 partial ring 3/23/2023, PFO closure 3/23/2023, chronic tobacco/marijuana use, COPD, HTN, CKD stage III, who presents today for post LVAD implant follow up.    He has an abcsessed tooth currently an says he took an old oxy pill from an old prescription. Last presiption was from September and only 5 tabs but says he was able to add it to even an older prescription. No oxycodone prescriptions since September- at this point we discussed the importance of only taking oxycodone if prescribed by a provider and shouldn't be using old tabs. Explained the importance of having a clear prescription record and understanding of where the oxycodone comes from in order to move forward with transplant listing. He understands. At this point it is not totally clear to me if the amount of positive oxycodone tests is explainable by his prior prescriptions as it has been now a really long time since he got a sizeable oxycodone prescription- we will need to continue to trend and assess with him. If we are seeing ongoing positives, then would recommend addiciton medicine again as its not totally clear how he is having access this oxycodone based on current information available in the [facility] database which I did review  today.    Labs for this appointment were drawn on 6/5 and reviewed today. Electrolytes stable. Cr stable at 1.31. LFTs stable. TSH WNL. WBC normal. Hgb stable. Okay to recheck all in 3 months. INR at goal - next recheck per ACC clinic    # Chronic systolic heart failure secondary to NICM with cardiogenic shock s/p HM III on 3/23/23 LVAD. ACC/AHA Stage D, NYHA Class III  Moderate TR s/p TV annuloplasty with 32 mm MC3 partial ring 3/23/2023.    Fluid status lasix 10 mg PRN as above, he will call us if he feels he needs it.   ACEi/ARB Continue lisinopril 10 mg daily. We have discussed making the change to entresto many times, for now, we are just going to remain on lisinopril and take entresto off the med list to avoid confusion. We can continue to consider this in the future  BB could consider in the future, deferred given other upcoming changes as above  Aldosterone antagonist Continue Inspra 12.5 mg daily  SGLT2i: Continue Jardiance 10 mg dialy  SCD prophylaxis ICD  NSAID use: contraindicated  Sleep Apnea Evaluation: not discussed today  BP: MAP goal 65-85, was 96 -> 90 -> 86  LDH trends: 290, stable  Anticoagulation: warfarin INR goal 2-3, today 2.41, dosing per ACC clinic  Antiplatelet: Off ASA  Transplant considerations- had previously been limited by tobacco/marijuana, off tobacco and there have been programmatic changes around marijuana, although would still need to stop based on current protocol. Testing positive for oxycodone without active rx for that (States he is using old prescriptions)- see conversation above. Would hold off on eval until we are getting negative tests.    VAD Interrogation on June 11, 2025 Interrogation reviewed with VAD coordinator. Agree with findings. Occasional PI events. No power spikes, speed drops, or other findings suspicious of pump malfunction noted.       # Persistent lukocytosis.   ID noted no indication for long term antibiotics inpatient given no acute findings on CT while  inpatient (he had been treated for HAP with IV antibiotics while inpatient). No signs or symptoms of infection today.  He did have a pre-op luekocytosis longstanding around 11-12,   - WBC WNL today    # Afib with RVR, resolved.   - Continue Amiodarone and Digoxin.  - Warfarin with INR goal 2-3, dosing per ACC clinic    # H/o VT/VF arrest  # H/o ICD shocks, inappropriate d/t RV lead oversensing. He did have  ICD removal and reimplant of medtronic system with lead revision.  - Follows with EP  - Continue amiodarone 100 mg daily, monitoring per EP- TSH WNL, LFTS WNL  - ICD checks per protocol- today's check without any noteable events    # Moderate TR s/p TV annuloplasty with 32 mm MC3 partial ring 3/23/2023  PFO closure 3/23/2023  - Trend on ECHOs    # Chronic tobacco/marijuana use  # Chronic oxycodone use  - Has been off tobacco since Jan 2023  - Still using cannibis  - Using oxycodone, reports this is from old prescriptions. Discussed the importance of not doing that/having provider recommendations for use and how important that is for transplant eval- would reassess transplant eval when getting negative testing    Follow-up:  - RTC in 3 months     Billing  - I managed 2 stable chronic conditions  - I reviewed 4 tests    The longitudinal plan of care for the diagnosis(es)/condition(s) as documented were addressed during this visit. Due to the added complexity in care, I will continue to support Akshat in the subsequent management and with ongoing continuity of care.    Elba Chen PA-C  Advacned Heart Failure  [facility]       Please do not hesitate to contact me if you have any questions/concerns.     Sincerely,     Elba Chen PA-C

## 2025-06-11 NOTE — NURSING NOTE
MCS VAD Pump Info       Row Name 06/11/25 0900             MCS VAD Information    Implant LVAD      LVAD Pump HeartMate 3         Heartmate 3 LEFT VS    Flow (Lpm) 4.5 Lpm      Pulse Index (PI) 3.4 PI      Speed (rpm) 5100 rpm      Power (mari) 3.7 mari      Current Hct setting 48      Retired: Unexpected Alarms --         Primary Controller    Serial number [id number]      Low flow alarm setting 2.5      High watt alarm setting --      EBB: Patient use 4      Replace in 23 Months         Backup Controller    Serial number [id number]      EBB: Patient use 4      Replace EBB in 4 Months      Speed & HCT match primary controller --         VAD Interrogation    Alarms reported by patient N      Unexpected alarms noted upon interrogation None      PI events Occasional      Damage to equipment is noted N      Action taken Reviewed proper equipment care and maintenance         Driveline Exit Site    Dressing change done N      Driveline properly secured Yes      DLES assessment c/d/i per pt report      Dressing used Daily kit      Frequency patient changes dressing Daily      Dressing modifications --      Dressing change supplier --                      Education Complete: Yes   Charge the BACKUP controller s backup battery every 6 months  Perform a self test on BACKUP every 6 months  Change the MPU s batteries every 6 months:Yes  Have equipment serviced yearly (if applicable):Yes

## 2025-06-11 NOTE — PATIENT INSTRUCTIONS
" Ventricular Assist Device Clinic  Take your medications every day, as directed!  Medication changes made today:  Digoxin -  take 62.5 mcg (1 tab) Monday, Wednesday, Friday and 125 mcg (2 tabs) every other day of the week  Instructions:  No changes Monitor your heart failure, Page the VAD Coordinator on call:  If you gain more than 3 lb in a day or 5 in a week  If you feel worsening shortness of breath, palpitations, or swelling.   If you have VAD alarms or change in parameters  If you feel dizzy or lightheaded     Keep your VAD appointments!      Please schedule an appointment with Crissy  for 3 months from now.     Please schedule an appointment with Dr Aguiar for 6 months from now.     See the clinic schedulers after your appointment to schedule follow-up.    If you do not have an appointment scheduled, you need to call the VAD  at [phone]. To Page the VAD Coordinator on call, dial [phone] option #4 and ask to speak to the VAD coordinator on call. Try to maintain a heart healthy lifestyle!  Limit salt & sodium to 2000mg/day   Eat a heart healthy diet, low in saturated fats  Stay active! Aim to move at least 150 minutes every week.    Use [employer] allows you to communicate directly with your heart team through secure messaging.  [employer] can be accessed any time on your phone, computer, or tablet.  If you need assistance, we'd be happy to help!      Equipment Reminders:   Charge your back-up controller at least every 6 months. To charge, connect it to either batteries or wall power. The screen will read \"Charging\". Keep connected until the screen reads \"charging complete\". Disconnect power once the controller battery is charged. Also do a self-test when the controller is connected to power.  Replace the AA batteries in your mobile power unit every 6 months.  Check your battery charger for when it is due for maintenance. It requires inspection in clinic once per year. There will be a " sticker stating the month and year maintenance is due. When you bring your battery charger to clinic, tell one of the schedulers you have LVAD equipment that needs maintenance. They will call [facility]. You can leave your  under the LVAD sign by the  at the far end of clinic. You must drop it off before 2pm.

## 2025-06-11 NOTE — NURSING NOTE
Chief Complaint   Patient presents with    Follow Up     Return VAD - 3 month RABIA VAD follow-up per Cosme and Crissy       Vitals were taken, medications reconciled.    Erika Koenig, Visit Facilitator

## 2025-06-13 LAB
MDC_IDC_LEAD_CONNECTION_STATUS: NORMAL
MDC_IDC_LEAD_IMPLANT_DT: NORMAL
MDC_IDC_LEAD_LOCATION: NORMAL
MDC_IDC_LEAD_LOCATION_DETAIL_1: NORMAL
MDC_IDC_LEAD_MFG: NORMAL
MDC_IDC_LEAD_MODEL: NORMAL
MDC_IDC_LEAD_POLARITY_TYPE: NORMAL
MDC_IDC_LEAD_SERIAL: NORMAL
MDC_IDC_LEAD_SPECIAL_FUNCTION: NORMAL
MDC_IDC_MSMT_BATTERY_DTM: NORMAL
MDC_IDC_MSMT_BATTERY_REMAINING_LONGEVITY: 163 MO
MDC_IDC_MSMT_BATTERY_RRT_TRIGGER: NORMAL
MDC_IDC_MSMT_BATTERY_STATUS: NORMAL
MDC_IDC_MSMT_BATTERY_VOLTAGE: 3.06 V
MDC_IDC_MSMT_CAP_CHARGE_DTM: NORMAL
MDC_IDC_MSMT_CAP_CHARGE_ENERGY: 18 J
MDC_IDC_MSMT_CAP_CHARGE_TIME: 3.5 S
MDC_IDC_MSMT_CAP_CHARGE_TYPE: NORMAL
MDC_IDC_MSMT_LEADCHNL_RV_IMPEDANCE_VALUE: 285 OHM
MDC_IDC_MSMT_LEADCHNL_RV_IMPEDANCE_VALUE: 418 OHM
MDC_IDC_MSMT_LEADCHNL_RV_PACING_THRESHOLD_AMPLITUDE: 1 V
MDC_IDC_MSMT_LEADCHNL_RV_PACING_THRESHOLD_PULSEWIDTH: 0.4 MS
MDC_IDC_MSMT_LEADCHNL_RV_SENSING_INTR_AMPL: 14.6 MV
MDC_IDC_PG_IMPLANT_DTM: NORMAL
MDC_IDC_PG_MFG: NORMAL
MDC_IDC_PG_MODEL: NORMAL
MDC_IDC_PG_SERIAL: NORMAL
MDC_IDC_PG_TYPE: NORMAL
MDC_IDC_SESS_CLINIC_NAME: NORMAL
MDC_IDC_SESS_DTM: NORMAL
MDC_IDC_SESS_TYPE: NORMAL
MDC_IDC_SET_BRADY_HYSTRATE: NORMAL
MDC_IDC_SET_BRADY_LOWRATE: 40 {BEATS}/MIN
MDC_IDC_SET_BRADY_MODE: NORMAL
MDC_IDC_SET_LEADCHNL_RA_SENSING_SENSITIVITY: NORMAL
MDC_IDC_SET_LEADCHNL_RV_PACING_AMPLITUDE: 2 V
MDC_IDC_SET_LEADCHNL_RV_PACING_ANODE_ELECTRODE_1: NORMAL
MDC_IDC_SET_LEADCHNL_RV_PACING_ANODE_LOCATION_1: NORMAL
MDC_IDC_SET_LEADCHNL_RV_PACING_CAPTURE_MODE: NORMAL
MDC_IDC_SET_LEADCHNL_RV_PACING_CATHODE_ELECTRODE_1: NORMAL
MDC_IDC_SET_LEADCHNL_RV_PACING_CATHODE_LOCATION_1: NORMAL
MDC_IDC_SET_LEADCHNL_RV_PACING_POLARITY: NORMAL
MDC_IDC_SET_LEADCHNL_RV_PACING_PULSEWIDTH: 0.4 MS
MDC_IDC_SET_LEADCHNL_RV_SENSING_ANODE_ELECTRODE_1: NORMAL
MDC_IDC_SET_LEADCHNL_RV_SENSING_ANODE_LOCATION_1: NORMAL
MDC_IDC_SET_LEADCHNL_RV_SENSING_CATHODE_ELECTRODE_1: NORMAL
MDC_IDC_SET_LEADCHNL_RV_SENSING_CATHODE_LOCATION_1: NORMAL
MDC_IDC_SET_LEADCHNL_RV_SENSING_POLARITY: NORMAL
MDC_IDC_SET_LEADCHNL_RV_SENSING_SENSITIVITY: 0.45 MV
MDC_IDC_SET_ZONE_DETECTION_BEATS_DENOMINATOR: 16 {BEATS}
MDC_IDC_SET_ZONE_DETECTION_BEATS_DENOMINATOR: 32 {BEATS}
MDC_IDC_SET_ZONE_DETECTION_BEATS_DENOMINATOR: 40 {BEATS}
MDC_IDC_SET_ZONE_DETECTION_BEATS_NUMERATOR: 16 {BEATS}
MDC_IDC_SET_ZONE_DETECTION_BEATS_NUMERATOR: 30 {BEATS}
MDC_IDC_SET_ZONE_DETECTION_BEATS_NUMERATOR: 32 {BEATS}
MDC_IDC_SET_ZONE_DETECTION_INTERVAL: 240 MS
MDC_IDC_SET_ZONE_DETECTION_INTERVAL: 280 MS
MDC_IDC_SET_ZONE_DETECTION_INTERVAL: 330 MS
MDC_IDC_SET_ZONE_DETECTION_INTERVAL: 450 MS
MDC_IDC_SET_ZONE_STATUS: NORMAL
MDC_IDC_SET_ZONE_TYPE: NORMAL
MDC_IDC_SET_ZONE_VENDOR_TYPE: NORMAL
MDC_IDC_STAT_AT_BURDEN_PERCENT: 0 %
MDC_IDC_STAT_AT_DTM_END: NORMAL
MDC_IDC_STAT_AT_DTM_START: NORMAL
MDC_IDC_STAT_BRADY_DTM_END: NORMAL
MDC_IDC_STAT_BRADY_DTM_START: NORMAL
MDC_IDC_STAT_BRADY_RA_PERCENT_PACED: NORMAL
MDC_IDC_STAT_BRADY_RV_PERCENT_PACED: 0.22 %
MDC_IDC_STAT_EPISODE_RECENT_COUNT: 0
MDC_IDC_STAT_EPISODE_RECENT_COUNT_DTM_END: NORMAL
MDC_IDC_STAT_EPISODE_RECENT_COUNT_DTM_START: NORMAL
MDC_IDC_STAT_EPISODE_TOTAL_COUNT: 0
MDC_IDC_STAT_EPISODE_TOTAL_COUNT: 3
MDC_IDC_STAT_EPISODE_TOTAL_COUNT_DTM_END: NORMAL
MDC_IDC_STAT_EPISODE_TOTAL_COUNT_DTM_START: NORMAL
MDC_IDC_STAT_EPISODE_TYPE: NORMAL
MDC_IDC_STAT_TACHYTHERAPY_ATP_DELIVERED_RECENT: 0
MDC_IDC_STAT_TACHYTHERAPY_ATP_DELIVERED_TOTAL: 0
MDC_IDC_STAT_TACHYTHERAPY_RECENT_DTM_END: NORMAL
MDC_IDC_STAT_TACHYTHERAPY_RECENT_DTM_START: NORMAL
MDC_IDC_STAT_TACHYTHERAPY_SHOCKS_ABORTED_RECENT: 0
MDC_IDC_STAT_TACHYTHERAPY_SHOCKS_ABORTED_TOTAL: 0
MDC_IDC_STAT_TACHYTHERAPY_SHOCKS_DELIVERED_RECENT: 0
MDC_IDC_STAT_TACHYTHERAPY_SHOCKS_DELIVERED_TOTAL: 0
MDC_IDC_STAT_TACHYTHERAPY_TOTAL_DTM_END: NORMAL
MDC_IDC_STAT_TACHYTHERAPY_TOTAL_DTM_START: NORMAL

## 2025-06-16 ENCOUNTER — MYC MEDICAL ADVICE (OUTPATIENT)
Dept: ANTICOAGULATION | Facility: CLINIC | Age: 60
End: 2025-06-16
Payer: COMMERCIAL

## 2025-06-18 LAB — INR HOME MONITORING: 2.6 (ref 2–3)

## 2025-06-19 ENCOUNTER — ANTICOAGULATION THERAPY VISIT (OUTPATIENT)
Dept: ANTICOAGULATION | Facility: CLINIC | Age: 60
End: 2025-06-19
Payer: COMMERCIAL

## 2025-06-19 ENCOUNTER — RESULTS FOLLOW-UP (OUTPATIENT)
Dept: ANTICOAGULATION | Facility: CLINIC | Age: 60
End: 2025-06-19

## 2025-06-19 DIAGNOSIS — Z79.01 ANTICOAGULATED ON COUMADIN: ICD-10-CM

## 2025-06-19 DIAGNOSIS — I50.22 CHRONIC SYSTOLIC CONGESTIVE HEART FAILURE (H): ICD-10-CM

## 2025-06-19 DIAGNOSIS — Z79.899 LONG TERM USE OF DRUG: ICD-10-CM

## 2025-06-19 DIAGNOSIS — I50.23 ACUTE ON CHRONIC SYSTOLIC CONGESTIVE HEART FAILURE (H): Primary | ICD-10-CM

## 2025-06-19 DIAGNOSIS — Z95.811 LVAD (LEFT VENTRICULAR ASSIST DEVICE) PRESENT (H): ICD-10-CM

## 2025-06-19 DIAGNOSIS — Z95.811 LEFT VENTRICULAR ASSIST DEVICE PRESENT (H): ICD-10-CM

## 2025-06-19 NOTE — PROGRESS NOTES
ANTICOAGULATION MANAGEMENT     Akshat Fragoso 60 year old male is on warfarin with therapeutic INR result. (Goal INR 2.0-3.0)    Recent labs: (last 7 days)     06/18/25  0000   INR 2.6       ASSESSMENT     Source(s): Chart Review  Previous INR was Therapeutic last 2(+) visits  Medication, diet, health changes since last INR: chart reviewed; none identified         PLAN     Recommended plan for no diet, medication or health factor changes affecting INR     Dosing Instructions: Continue your current warfarin dose with next INR in 2 weeks       Summary  As of 6/19/2025      Full warfarin instructions:  2.5 mg every Mon, Wed, Fri; 5 mg all other days   Next INR check:  7/2/2025               Detailed voice message left for Akshat with dosing instructions and follow up date.   Sent Lendio message with dosing and follow up instructions    Patient to recheck with home meter    Education provided: Please call back if any changes to your diet, medications or how you've been taking warfarin  Goal range and lab monitoring: goal range and significance of current result and Importance of following up at instructed interval  Contact [phone] with any changes, questions or concerns.     Plan made per ACC anticoagulation protocol and per LVAD protocol    TREV LYN RN  6/19/2025  Anticoagulation Clinic  [employer] for routing messages: earnest ANTICOAG LVAD  [facility] patient phone line: [phone]        _______________________________________________________________________     Anticoagulation Episode Summary       Current INR goal:  2.0-3.0   TTR:  85.4% (11.8 mo)   Target end date:  Indefinite   Send INR reminders to:  ANTICOAG LVAD    Indications    Acute on chronic systolic congestive heart failure (H) [I50.23]  LVAD (left ventricular assist device) present (H) [Z95.811]  Chronic systolic congestive heart failure (H) [I50.22]  Long term use of drug [Z79.899]  Left ventricular assist device present (H) [Z95.811]  Anticoagulated on  Coumadin [Z79.01]             Comments:  Follow VAD Anticoag protocol:Yes: HeartMate 3   Bridging: Enoxaparin   Date VAD placed: 3/23/23  [employer] home monitor             Anticoagulation Care Providers       Provider Role Specialty Phone number    Kenzie Moreau MD Referring Advanced Heart Failure and Transplant Cardiology [phone]    Mile Bolivar, VERONICA CNP Referring Nurse Practitioner [phone]    Shaun Aguiar MD Referring Cardiovascular Disease [phone]

## 2025-06-21 ENCOUNTER — MYC REFILL (OUTPATIENT)
Dept: CARDIOLOGY | Facility: CLINIC | Age: 60
End: 2025-06-21
Payer: COMMERCIAL

## 2025-06-21 DIAGNOSIS — Z95.811 LVAD (LEFT VENTRICULAR ASSIST DEVICE) PRESENT (H): ICD-10-CM

## 2025-06-24 RX ORDER — DIGOXIN 0.06 MG/1
TABLET ORAL
Qty: 132 TABLET | Refills: 3 | Status: SHIPPED | OUTPATIENT
Start: 2025-06-24

## 2025-06-25 ENCOUNTER — CARE COORDINATION (OUTPATIENT)
Dept: CARDIOLOGY | Facility: CLINIC | Age: 60
End: 2025-06-25
Payer: COMMERCIAL

## 2025-06-25 NOTE — PROGRESS NOTES
D:  Pt mychart messaged writer with concerns on one of his batteries (SN- [id number], expiring 9/23/2025).  Pt reports battery will not fully charge and causes a red dot on the battery charger when placed in any pocket.  No other batteries causing red dots on .  Pt requesting replacement.  I:  Advised we will need to evaluate battery charger and battery before replacing equipment.  Arranged for a nurse visit 6/26 @ 1pm.  Instructed pt to bring  and bad battery.  Pt agreeable.  A:  LVAD battery concern  P:  Pt verbalized understanding of the instructions given.  Will call VAD coordinator with further needs and questions.

## 2025-06-26 ENCOUNTER — ALLIED HEALTH/NURSE VISIT (OUTPATIENT)
Dept: CARDIOLOGY | Facility: CLINIC | Age: 60
End: 2025-06-26
Payer: COMMERCIAL

## 2025-06-26 DIAGNOSIS — I50.22 CHRONIC SYSTOLIC CONGESTIVE HEART FAILURE (H): Primary | ICD-10-CM

## 2025-06-26 DIAGNOSIS — Z95.811 LVAD (LEFT VENTRICULAR ASSIST DEVICE) PRESENT (H): ICD-10-CM

## 2025-06-26 NOTE — NURSING NOTE
Akshat was given a new battery to replace a battery that gives a red light after sitting in any  slot on the battery charger. See the VAD Checklist for details.    The returned and recycled battery is [id number]

## 2025-07-07 ENCOUNTER — TELEPHONE (OUTPATIENT)
Dept: FAMILY MEDICINE | Facility: CLINIC | Age: 60
End: 2025-07-07
Payer: COMMERCIAL

## 2025-07-07 ENCOUNTER — ANTICOAGULATION THERAPY VISIT (OUTPATIENT)
Dept: ANTICOAGULATION | Facility: CLINIC | Age: 60
End: 2025-07-07
Payer: COMMERCIAL

## 2025-07-07 DIAGNOSIS — Z95.811 LEFT VENTRICULAR ASSIST DEVICE PRESENT (H): ICD-10-CM

## 2025-07-07 DIAGNOSIS — I50.22 CHRONIC SYSTOLIC CONGESTIVE HEART FAILURE (H): ICD-10-CM

## 2025-07-07 DIAGNOSIS — Z79.899 LONG TERM USE OF DRUG: ICD-10-CM

## 2025-07-07 DIAGNOSIS — I50.23 ACUTE ON CHRONIC SYSTOLIC CONGESTIVE HEART FAILURE (H): Primary | ICD-10-CM

## 2025-07-07 DIAGNOSIS — Z95.811 LVAD (LEFT VENTRICULAR ASSIST DEVICE) PRESENT (H): ICD-10-CM

## 2025-07-07 DIAGNOSIS — Z79.01 ANTICOAGULATED ON COUMADIN: ICD-10-CM

## 2025-07-07 LAB — INR HOME MONITORING: 3.4 (ref 2–3)

## 2025-07-07 NOTE — PROGRESS NOTES
ANTICOAGULATION MANAGEMENT     Akshat Fragoso 60 year old male is on warfarin with supratherapeutic INR result. (Goal INR 2.0-3.0)    Recent labs: (last 7 days)     07/07/25  0000   INR 3.4*       ASSESSMENT     Source(s): Chart Review and Patient/Caregiver Call     Warfarin doses taken: Warfarin taken as instructed  Diet: No new diet changes identified. Even with abscessed tooth, patient feels his diet intake/texture modification/greens intake is unchanged.  Medication/supplement changes:  Amoxicillin started on 7/3 and finishing course on Saturday, 7/12 for abscessed tooth, which may increase INR/bleeding risk Akshat states this antibiotic has increased his INR in the past but feels this is higher than usual.  Taking Tylenol 1000 mg BID (may increase INR + bleeding risk)  New illness, injury, or hospitalization: Yes: Patient reports abscessed tooth--was having a lot of swelling/inflammation/pain last week but has improved since starting antibiotic. Plan for tooth extraction in ~3 weeks (date TBD) at the [facility]. Has additional tooth (not abscessed) that needs to be pulled at later date.   Signs or symptoms of bleeding or clotting: Yes: Patient reports bruising easily, but unchanged from baseline.  Previous result: Therapeutic last 2(+) visits  Additional findings: Upcoming surgery/procedure Abscessed tooth to be pulled in ~3 weeks (date TBD) at the [facility]. Has additional tooth (not abscessed) that needs to be pulled at later date. Patient instructed to notify [facility] as soon as procedure is scheduled.   [facility] consult d/t requesting full hold with [facility] as patient's MD is 1/2 tab today with no smaller warfarin tablets prescribed.       PLAN     Recommended plan for temporary change(s) affecting INR     Dosing Instructions: hold dose then continue your current warfarin dose with next INR in 1 week       Summary  As of 7/7/2025      Full warfarin instructions:  7/7: Hold; Otherwise  2.5 mg every Mon, Wed, Fri; 5 mg all other days   Next INR check:  7/14/2025               Telephone call with Akshat who verbalizes understanding and agrees to plan  Sent [employer] message with dosing and follow up instructions    Patient to recheck with home meter    Education provided: Please call back if any changes to your diet, medications or how you've been taking warfarin  Goal range and lab monitoring: goal range and significance of current result, Importance of therapeutic range, and Importance of following up at instructed interval  Dietary considerations: importance of consistent vitamin K intake, impact of vitamin K foods on INR, and vitamin K content of foods  Interaction IS anticipated between warfarin and amoxicillin, tylenol  Symptom monitoring: monitoring for bleeding signs and symptoms, when to seek medical attention/emergency care, and if you hit your head or have a bad fall seek emergency care  Importance of notifying anticoagulation clinic for: changes in medications; a sooner lab recheck maybe needed and upcoming surgeries and procedures 2 weeks in advance  Written instructions provided  Contact [phone] with any changes, questions or concerns.     Plan made with [facility] Pharmacist Lizette Yi and per LVAD protocol    Radha Jernigan RN  7/7/2025  Anticoagulation Clinic  [employer] for routing messages: [employer] ANTICOAG LVAD  [facility] patient phone line: [phone]        _______________________________________________________________________     Anticoagulation Episode Summary       Current INR goal:  2.0-3.0   TTR:  82.7% (11.8 mo)   Target end date:  Indefinite   Send INR reminders to:  ANTICOAG LVAD    Indications    Acute on chronic systolic congestive heart failure (H) [I50.23]  LVAD (left ventricular assist device) present (H) [Z95.811]  Chronic systolic congestive heart failure (H) [I50.22]  Long term use of drug [Z79.899]  Left ventricular assist device present (H) [Z95.811]  Anticoagulated on  Coumadin [Z79.01]             Comments:  Follow VAD Anticoag protocol:Yes: HeartMate 3   Bridging: Enoxaparin   Date VAD placed: 3/23/23  [employer] home monitor             Anticoagulation Care Providers       Provider Role Specialty Phone number    Kenzie Moreau MD Referring Advanced Heart Failure and Transplant Cardiology [phone]    Mile Bolivar, VERONICA CNP Referring Nurse Practitioner [phone]    Shaun Aguiar MD Referring Cardiovascular Disease [phone]

## 2025-07-07 NOTE — TELEPHONE ENCOUNTER
Returned call to address supra-therapeutic INR. Please see 7/7/25 [facility] encounter for more details. Concerns addressed, questions answered, and patient in agreement with plan.    Radha Jernigan RN  Anticoagulation Clinic

## 2025-07-07 NOTE — TELEPHONE ENCOUNTER
General Call    Contacts       Contact Date/Time Type Contact Phone/Fax    07/07/2025 10:07 AM CDT Phone (Incoming) Akshat Fragoso (Self) [phone] (H)          Reason for Call:  Speak to INR RN    What are your questions or concerns:  Pt wants to speak to an RN. INR was 3.4. Pt is on amoxicillin for a tooth issue but reports that it has not been this high before. Wants to speak to someone about if a dosage adjustment is needed.     Date of last appointment with provider: -    Could we send this information to you in [facility] or would you prefer to receive a phone call?:   Patient would prefer a phone call   Okay to leave a detailed message?: Yes at Home number on file [phone] (home)

## 2025-07-10 DIAGNOSIS — I50.22 CHRONIC SYSTOLIC CONGESTIVE HEART FAILURE (H): ICD-10-CM

## 2025-07-10 DIAGNOSIS — Z95.811 LVAD (LEFT VENTRICULAR ASSIST DEVICE) PRESENT (H): ICD-10-CM

## 2025-07-10 RX ORDER — LISINOPRIL 2.5 MG/1
10 TABLET ORAL EVERY MORNING
Qty: 360 TABLET | Refills: 3 | Status: SHIPPED | OUTPATIENT
Start: 2025-07-10

## 2025-07-21 ENCOUNTER — MYC MEDICAL ADVICE (OUTPATIENT)
Dept: ANTICOAGULATION | Facility: CLINIC | Age: 60
End: 2025-07-21
Payer: COMMERCIAL

## 2025-07-24 ENCOUNTER — ANTICOAGULATION THERAPY VISIT (OUTPATIENT)
Dept: ANTICOAGULATION | Facility: CLINIC | Age: 60
End: 2025-07-24
Payer: COMMERCIAL

## 2025-07-24 DIAGNOSIS — Z95.811 LVAD (LEFT VENTRICULAR ASSIST DEVICE) PRESENT (H): ICD-10-CM

## 2025-07-24 DIAGNOSIS — Z79.899 LONG TERM USE OF DRUG: ICD-10-CM

## 2025-07-24 DIAGNOSIS — Z95.811 LEFT VENTRICULAR ASSIST DEVICE PRESENT (H): ICD-10-CM

## 2025-07-24 DIAGNOSIS — I50.22 CHRONIC SYSTOLIC CONGESTIVE HEART FAILURE (H): ICD-10-CM

## 2025-07-24 DIAGNOSIS — Z79.01 ANTICOAGULATED ON COUMADIN: ICD-10-CM

## 2025-07-24 DIAGNOSIS — I50.23 ACUTE ON CHRONIC SYSTOLIC CONGESTIVE HEART FAILURE (H): Primary | ICD-10-CM

## 2025-07-24 LAB — INR HOME MONITORING: 2.8 (ref 2–3)

## 2025-07-24 NOTE — PROGRESS NOTES
ANTICOAGULATION MANAGEMENT     Akshat Fragoso 60 year old male is on warfarin with therapeutic INR result. (Goal INR 2.0-3.0)    Recent labs: (last 7 days)     07/24/25  0000   INR 2.8       ASSESSMENT     Source(s): Chart Review  Previous INR was Supratherapeutic  Medication, diet, health changes since last INR: chart reviewed; none identified           PLAN     Recommended plan for no diet, medication or health factor changes affecting INR     Dosing Instructions: Continue your current warfarin dose with next INR in 1 week       Summary  As of 7/24/2025      Full warfarin instructions:  2.5 mg every Mon, Wed, Fri; 5 mg all other days   Next INR check:  7/31/2025               Detailed voice message left for Akshat with dosing instructions and follow up date.   Sent [employer] message with dosing and follow up instructions    Patient to recheck with home meter    Education provided: Please call back if any changes to your diet, medications or how you've been taking warfarin    Plan made per [facility] anticoagulation protocol and per LVAD protocol    Denisse Pillai RN  7/24/2025  Anticoagulation Clinic  [facility] for routing messages: p ANTICOAG LVAD  [facility] patient phone line: [phone]        _______________________________________________________________________     Anticoagulation Episode Summary       Current INR goal:  2.0-3.0   TTR:  80.2% (11.8 mo)   Target end date:  Indefinite   Send INR reminders to:  ANTICOAG LVAD    Indications    Acute on chronic systolic congestive heart failure (H) [I50.23]  LVAD (left ventricular assist device) present (H) [Z95.811]  Chronic systolic congestive heart failure (H) [I50.22]  Long term use of drug [Z79.899]  Left ventricular assist device present (H) [Z95.811]  Anticoagulated on Coumadin [Z79.01]             Comments:  Follow VAD Anticoag protocol:Yes: HeartMate 3   Bridging: Enoxaparin   Date VAD placed: 3/23/23  [employer] home monitor             Anticoagulation Care Providers        Provider Role Specialty Phone number    Kenzie Moreau MD Referring Advanced Heart Failure and Transplant Cardiology [phone]    Mile Bolivar, APRN CNP Referring Nurse Practitioner [phone]    Shaun Aguiar MD Referring Cardiovascular Disease [phone]

## 2025-08-14 ENCOUNTER — ANTICOAGULATION THERAPY VISIT (OUTPATIENT)
Dept: ANTICOAGULATION | Facility: CLINIC | Age: 60
End: 2025-08-14
Payer: COMMERCIAL

## 2025-08-14 DIAGNOSIS — Z95.811 LVAD (LEFT VENTRICULAR ASSIST DEVICE) PRESENT (H): ICD-10-CM

## 2025-08-14 DIAGNOSIS — Z79.01 ANTICOAGULATED ON COUMADIN: ICD-10-CM

## 2025-08-14 DIAGNOSIS — Z79.899 LONG TERM USE OF DRUG: ICD-10-CM

## 2025-08-14 DIAGNOSIS — I50.22 CHRONIC SYSTOLIC CONGESTIVE HEART FAILURE (H): ICD-10-CM

## 2025-08-14 DIAGNOSIS — I50.23 ACUTE ON CHRONIC SYSTOLIC CONGESTIVE HEART FAILURE (H): Primary | ICD-10-CM

## 2025-08-14 DIAGNOSIS — Z95.811 LEFT VENTRICULAR ASSIST DEVICE PRESENT (H): ICD-10-CM

## 2025-08-14 LAB — INR HOME MONITORING: 3 (ref 2–3)

## 2025-08-20 ENCOUNTER — ANTICOAGULATION THERAPY VISIT (OUTPATIENT)
Dept: ANTICOAGULATION | Facility: CLINIC | Age: 60
End: 2025-08-20
Payer: COMMERCIAL

## 2025-08-20 LAB — INR HOME MONITORING: 2.9 (ref 2–3)

## 2025-08-26 ENCOUNTER — TELEPHONE (OUTPATIENT)
Dept: CARDIOLOGY | Facility: CLINIC | Age: 60
End: 2025-08-26
Payer: COMMERCIAL

## 2025-08-26 DIAGNOSIS — I50.22 CHRONIC SYSTOLIC CONGESTIVE HEART FAILURE (H): ICD-10-CM

## 2025-08-26 DIAGNOSIS — I50.23 ACUTE ON CHRONIC SYSTOLIC CONGESTIVE HEART FAILURE (H): ICD-10-CM

## 2025-08-26 DIAGNOSIS — Z95.811 LVAD (LEFT VENTRICULAR ASSIST DEVICE) PRESENT (H): ICD-10-CM

## 2025-08-26 RX ORDER — WARFARIN SODIUM 5 MG/1
2.5-5 TABLET ORAL EVERY EVENING
Qty: 90 TABLET | Refills: 1 | Status: SHIPPED | OUTPATIENT
Start: 2025-08-26

## 2025-08-28 ENCOUNTER — TELEPHONE (OUTPATIENT)
Dept: TRANSPLANT | Facility: CLINIC | Age: 60
End: 2025-08-28
Payer: COMMERCIAL

## 2025-08-28 ENCOUNTER — ANTICOAGULATION THERAPY VISIT (OUTPATIENT)
Dept: ANTICOAGULATION | Facility: CLINIC | Age: 60
End: 2025-08-28
Payer: COMMERCIAL

## 2025-08-28 DIAGNOSIS — Z79.899 LONG TERM USE OF DRUG: ICD-10-CM

## 2025-08-28 DIAGNOSIS — Z95.811 LVAD (LEFT VENTRICULAR ASSIST DEVICE) PRESENT (H): ICD-10-CM

## 2025-08-28 DIAGNOSIS — I50.23 ACUTE ON CHRONIC SYSTOLIC CONGESTIVE HEART FAILURE (H): Primary | ICD-10-CM

## 2025-08-28 DIAGNOSIS — Z79.01 ANTICOAGULATED ON COUMADIN: ICD-10-CM

## 2025-08-28 DIAGNOSIS — I50.22 CHRONIC SYSTOLIC CONGESTIVE HEART FAILURE (H): ICD-10-CM

## 2025-08-28 DIAGNOSIS — Z95.811 LEFT VENTRICULAR ASSIST DEVICE PRESENT (H): ICD-10-CM

## 2025-08-28 LAB — INR HOME MONITORING: 3 (ref 2–3)

## 2025-09-04 ENCOUNTER — ANTICOAGULATION THERAPY VISIT (OUTPATIENT)
Dept: ANTICOAGULATION | Facility: CLINIC | Age: 60
End: 2025-09-04
Payer: COMMERCIAL

## 2025-09-04 DIAGNOSIS — Z95.811 LEFT VENTRICULAR ASSIST DEVICE PRESENT (H): ICD-10-CM

## 2025-09-04 DIAGNOSIS — Z95.811 LVAD (LEFT VENTRICULAR ASSIST DEVICE) PRESENT (H): ICD-10-CM

## 2025-09-04 DIAGNOSIS — Z79.899 LONG TERM USE OF DRUG: ICD-10-CM

## 2025-09-04 DIAGNOSIS — Z79.01 ANTICOAGULATED ON COUMADIN: ICD-10-CM

## 2025-09-04 DIAGNOSIS — I50.23 ACUTE ON CHRONIC SYSTOLIC CONGESTIVE HEART FAILURE (H): Primary | ICD-10-CM

## 2025-09-04 DIAGNOSIS — I50.22 CHRONIC SYSTOLIC CONGESTIVE HEART FAILURE (H): ICD-10-CM

## 2025-09-04 LAB — INR HOME MONITORING: 2.7 (ref 2–3)

## (undated) DEVICE — SOL NACL 0.9% IRRIG 1000ML BOTTLE 2F7124

## (undated) DEVICE — TIES BANDING T50R

## (undated) DEVICE — CLIP HORIZON MED BLUE 002200

## (undated) DEVICE — INTRODUCER SAFESHEATH II LONG 9FR 23 CM SSL9

## (undated) DEVICE — TAPE MEDIPORE 4"X2YD 2864

## (undated) DEVICE — CATH ULTRASOUND 9FR ULTRA ICE FLUID DOCK M00499151

## (undated) DEVICE — SU STEEL 6 CCS 4X18" M654G

## (undated) DEVICE — BONE WAX 2.5GM W31G

## (undated) DEVICE — SU ETHIBOND 2-0 SHDA 30" X563H

## (undated) DEVICE — SU PLEDGET SOFT TFE 13MMX7MMX1.5MM D7044

## (undated) DEVICE — PACK ADULT HEART UMMC PV15CG92D

## (undated) DEVICE — BASIN SET SINGLE STERILE 13752-624

## (undated) DEVICE — PREP CHLORAPREP 26ML TINTED HI-LITE ORANGE 930815

## (undated) DEVICE — SU SILK 0 TIE 6X30" A306H

## (undated) DEVICE — SU VICRYL 3-0 FS-1 27" J442H

## (undated) DEVICE — SOL NACL 0.9% 10ML VIAL 0409-4888-02

## (undated) DEVICE — KIT PCNG MEDTRONIC LEAD ACCESS

## (undated) DEVICE — SU VICRYL 3-0 X-1 27" UND J458H

## (undated) DEVICE — SU VICRYL 4-0 FS-2 27" J422-H

## (undated) DEVICE — Device

## (undated) DEVICE — SHEATH DILATOR TIGHTRAIL ROTATING 11FR 545-511

## (undated) DEVICE — KIT INTRODUCER DL 9FR 11.5CM J-TIP 0.35"X45CM CDC-21242-1A

## (undated) DEVICE — PACK HEART RIGHT CUSTOM SAN32RHF18

## (undated) DEVICE — CLIP HORIZON SM RED WIDE SLOT 001201

## (undated) DEVICE — SU VICRYL 0 CT-1 27" UND J260H

## (undated) DEVICE — DRSG TEGADERM 8X12" 1629

## (undated) DEVICE — CATH SWAN CCO/SV02/CEDV 8FR 110CM W/HEP 777F8

## (undated) DEVICE — SYR 30ML LL W/O NDL 302832

## (undated) DEVICE — ADH SKIN CLOSURE PREMIERPRO EXOFIN 1.0ML 3470

## (undated) DEVICE — INTRO SHEATH 7FRX10CM PINNACLE RSS702

## (undated) DEVICE — KIT MICROINTRODUCER VASCULAR VSI 4FRX0.018INX40CM 7195X

## (undated) DEVICE — SUCTION MANIFOLD NEPTUNE 2 SYS 4 PORT 0702-020-000

## (undated) DEVICE — DRAIN CHEST TUBE 32FR STR 8032

## (undated) DEVICE — LINEN TOWEL PACK X30 5481

## (undated) DEVICE — GUIDEWIRE L80CM OD.035IN 3 CM J-TIP V

## (undated) DEVICE — SU SILK 0 SH 30" K834H

## (undated) DEVICE — PITCHER STERILE 1000ML  SSK9004A

## (undated) DEVICE — CABLE PACING ALLIGATOR CLIP 301-CG

## (undated) DEVICE — NDL COUNTER 40CT  31142311

## (undated) DEVICE — DRAPE IOBAN INCISE 23X17" 6650EZ

## (undated) DEVICE — DEFIB PRO-PADZ LVP LQD GEL ADULT 8900-2105-01

## (undated) DEVICE — CATH ULTRASOUND 8.5FRX110CM ICE FLUID M00499120

## (undated) DEVICE — SHEATH PRELUDE SNAP 13CM 9FR

## (undated) DEVICE — BLADE SAW STERNAL 20X30MM KM-32

## (undated) DEVICE — RX SURGIFLO HEMOSTATIC MATRIX W/THROMBIN 8ML 2994

## (undated) DEVICE — SUCTION CATH AIRLIFE TRI-FLO W/CONTROL PORT 14FR  T60C

## (undated) DEVICE — DRSG ABDOMINAL 07 1/2X8" 7197D

## (undated) DEVICE — KIT PREP BRIDGE 591-001

## (undated) DEVICE — LINEN TOWEL PACK X6 WHITE 5487

## (undated) DEVICE — PROTECTOR ARM ONE-STEP TRENDELENBURG 40418

## (undated) DEVICE — GUIDEWIRE VASCULAR 0.035IN DIA 145CML 15CML/6CML STAINLESS

## (undated) DEVICE — NDL ANGIOCATH 14GA 1.25" 4048

## (undated) DEVICE — LINEN GOWN XLG 5407

## (undated) DEVICE — CABLE PACING ALLIGATOR CLIP 12FT 5833SL

## (undated) DEVICE — DRAPE CARDIOVASCULAR VELCRO SPLIT 9157

## (undated) DEVICE — CONNECTOR BLAKE DRAIN SGL BCC1

## (undated) DEVICE — DRAPE BACK TABLE  44X90" 8377

## (undated) DEVICE — SU PLEDGET SOFT TFE 3/8"X3/26"X1/16" PCP40

## (undated) DEVICE — TOURNIQUET VASCULAR KIT ARGYLE 8888-585000

## (undated) DEVICE — SU VICRYL 2-0 CT-1 27" UND J259H

## (undated) DEVICE — ESU ELEC BLADE E-SEP INSULATED NEPTUNE 70MM 0703-070-002

## (undated) DEVICE — ESU PENCIL SMOKE EVAC W/ROCKER SWITCH 0703-047-000

## (undated) DEVICE — DRSG DRAIN 4X4" 7086

## (undated) DEVICE — SU MONOCRYL 4-0 PS-2 27" UND Y426H

## (undated) DEVICE — DRAPE TIBURON CARDIOVASCULAR PERI-GROIN LF 9154

## (undated) DEVICE — SU ETHIBOND 2-0 V-5DA 10X30" PXX52

## (undated) DEVICE — BLADE CLIPPER SGL USE 9680

## (undated) DEVICE — SU PROLENE 4-0 RB-1DA 36" 8557H

## (undated) DEVICE — INTRO SHEATH 9FRX10CM PINNACLE RSS902

## (undated) DEVICE — WIRE GUIDE 0.025"X150CM EMERALD J TIP 502524

## (undated) DEVICE — KIT RIGHT HEART CATH 60130719

## (undated) DEVICE — TUBING SUCTION DRAINAGE PLEURAL DUAL 8884714200

## (undated) DEVICE — SOL NACL 0.9% IRRIG 3000ML BAG 2B7477

## (undated) DEVICE — SU STEEL MYO/WIRE II STERNOTOMY 8 BE-1 3X14" 048-217

## (undated) DEVICE — ESU HOLSTER PLASTIC DISP E2400

## (undated) DEVICE — INTRO SHEATH MICRO PLATINUM TIP 4FRX40CM 7274

## (undated) DEVICE — WIRE GUIDE 0.035"X150CM EMERALD J TIP 502521

## (undated) DEVICE — DEFIB PADPRO CONMED ADULT/CHILD 2001Z-C

## (undated) DEVICE — CONNECTOR DRAIN CHEST Y EXTENSION SET 19909

## (undated) DEVICE — KIT ACCESSORY DEVICE LLD SPECTRANETICS 518-027

## (undated) DEVICE — SU ETHIBOND 2-0 MHDA 36" X843H

## (undated) DEVICE — SU VICRYL 0 CTX 36" J370H

## (undated) DEVICE — SU ETHIBOND 0 CT-1 CR 8X18" CX21D

## (undated) DEVICE — DRSG TELFA 3X8" 1238

## (undated) DEVICE — KIT MANIFOLD NAMIC STANDARD 72IN RIGHT HEART 2 PT 613000213

## (undated) DEVICE — CLIPPER LEAD WIRE EXTRACTION LR-CLP001 G20003

## (undated) DEVICE — THORATEC HEARTMATE 3 VAD MODULAR CABLE

## (undated) DEVICE — KIT WRENCH 5873W

## (undated) DEVICE — TUBING INSUFFLATION PNEUMOCLEAR 0620050100

## (undated) DEVICE — KIT MICRO-INTRODUCER STIFFEN 4FR 7274V

## (undated) DEVICE — SU VICRYL 2-0 CT-1 27" J339H

## (undated) DEVICE — SU PDS II 0 CTX 36" Z370T

## (undated) DEVICE — WIPES FOLEY CARE SURESTEP PROVON DFC100

## (undated) DEVICE — SUCTION DRY CHEST DRAIN OASIS 3600-100

## (undated) DEVICE — DEVICE LLD EZ LEAD LOCKING STYLET SPECTRANETICS 518-062

## (undated) DEVICE — SU PROLENE 4-0 SHDA 36" 8521H

## (undated) DEVICE — SURGICEL HEMOSTAT 4X8" 1952

## (undated) DEVICE — SU ETHIBOND 3-0 BBDA 36" X588H

## (undated) DEVICE — FILTER BLOOD ULTIPOR  SQ40S

## (undated) DEVICE — DEVICE TISSUE STABILIZATION OCTOBASE 28707

## (undated) DEVICE — DRAIN CHEST TUBE RIGHT ANGLED 28FR 8128

## (undated) DEVICE — INTRO SHEATH 4FRX10CM PINNACLE RSS402

## (undated) RX ORDER — LIDOCAINE HYDROCHLORIDE 10 MG/ML
INJECTION, SOLUTION EPIDURAL; INFILTRATION; INTRACAUDAL; PERINEURAL
Status: DISPENSED
Start: 2025-03-04

## (undated) RX ORDER — CEFAZOLIN SODIUM 1 G/3ML
INJECTION, POWDER, FOR SOLUTION INTRAMUSCULAR; INTRAVENOUS
Status: DISPENSED
Start: 2017-06-08

## (undated) RX ORDER — FENTANYL CITRATE 50 UG/ML
INJECTION, SOLUTION INTRAMUSCULAR; INTRAVENOUS
Status: DISPENSED
Start: 2017-01-11

## (undated) RX ORDER — HYDROMORPHONE HYDROCHLORIDE 1 MG/ML
INJECTION, SOLUTION INTRAMUSCULAR; INTRAVENOUS; SUBCUTANEOUS
Status: DISPENSED
Start: 2024-01-04

## (undated) RX ORDER — PROPOFOL 10 MG/ML
INJECTION, EMULSION INTRAVENOUS
Status: DISPENSED
Start: 2023-03-23

## (undated) RX ORDER — CEFAZOLIN SODIUM 1 G/3ML
INJECTION, POWDER, FOR SOLUTION INTRAMUSCULAR; INTRAVENOUS
Status: DISPENSED
Start: 2024-09-05

## (undated) RX ORDER — CEFAZOLIN SODIUM 1 G/3ML
INJECTION, POWDER, FOR SOLUTION INTRAMUSCULAR; INTRAVENOUS
Status: DISPENSED
Start: 2023-03-23

## (undated) RX ORDER — LIDOCAINE 40 MG/G
CREAM TOPICAL
Status: DISPENSED
Start: 2025-03-04

## (undated) RX ORDER — CEFAZOLIN SODIUM 2 G/100ML
INJECTION, SOLUTION INTRAVENOUS
Status: DISPENSED
Start: 2024-09-05

## (undated) RX ORDER — FENTANYL CITRATE 50 UG/ML
INJECTION, SOLUTION INTRAMUSCULAR; INTRAVENOUS
Status: DISPENSED
Start: 2023-03-22

## (undated) RX ORDER — LIDOCAINE 40 MG/G
CREAM TOPICAL
Status: DISPENSED
Start: 2025-06-05

## (undated) RX ORDER — FENTANYL CITRATE 50 UG/ML
INJECTION, SOLUTION INTRAMUSCULAR; INTRAVENOUS
Status: DISPENSED
Start: 2017-06-08

## (undated) RX ORDER — LIDOCAINE HYDROCHLORIDE 10 MG/ML
INJECTION, SOLUTION EPIDURAL; INFILTRATION; INTRACAUDAL; PERINEURAL
Status: DISPENSED
Start: 2018-11-19

## (undated) RX ORDER — HEPARIN SODIUM 1000 [USP'U]/ML
INJECTION, SOLUTION INTRAVENOUS; SUBCUTANEOUS
Status: DISPENSED
Start: 2017-06-08

## (undated) RX ORDER — FENTANYL CITRATE 50 UG/ML
INJECTION, SOLUTION INTRAMUSCULAR; INTRAVENOUS
Status: DISPENSED
Start: 2024-09-05

## (undated) RX ORDER — HEPARIN SODIUM 1000 [USP'U]/ML
INJECTION, SOLUTION INTRAVENOUS; SUBCUTANEOUS
Status: DISPENSED
Start: 2017-01-11

## (undated) RX ORDER — FENTANYL CITRATE 50 UG/ML
INJECTION, SOLUTION INTRAMUSCULAR; INTRAVENOUS
Status: DISPENSED
Start: 2024-01-04

## (undated) RX ORDER — LIDOCAINE HYDROCHLORIDE 10 MG/ML
INJECTION, SOLUTION EPIDURAL; INFILTRATION; INTRACAUDAL; PERINEURAL
Status: DISPENSED
Start: 2023-03-22

## (undated) RX ORDER — NITROGLYCERIN 5 MG/ML
VIAL (ML) INTRAVENOUS
Status: DISPENSED
Start: 2017-01-11

## (undated) RX ORDER — HEPARIN SODIUM 1000 [USP'U]/ML
INJECTION, SOLUTION INTRAVENOUS; SUBCUTANEOUS
Status: DISPENSED
Start: 2023-03-23

## (undated) RX ORDER — LIDOCAINE 40 MG/G
CREAM TOPICAL
Status: DISPENSED
Start: 2024-04-01

## (undated) RX ORDER — FENTANYL CITRATE 50 UG/ML
INJECTION, SOLUTION INTRAMUSCULAR; INTRAVENOUS
Status: DISPENSED
Start: 2023-03-23

## (undated) RX ORDER — LIDOCAINE 40 MG/G
CREAM TOPICAL
Status: DISPENSED
Start: 2018-11-19

## (undated) RX ORDER — LIDOCAINE 40 MG/G
CREAM TOPICAL
Status: DISPENSED
Start: 2023-08-07

## (undated) RX ORDER — LIDOCAINE 40 MG/G
CREAM TOPICAL
Status: DISPENSED
Start: 2019-11-20

## (undated) RX ORDER — LIDOCAINE HYDROCHLORIDE 10 MG/ML
INJECTION, SOLUTION EPIDURAL; INFILTRATION; INTRACAUDAL; PERINEURAL
Status: DISPENSED
Start: 2023-08-07

## (undated) RX ORDER — CEFAZOLIN SODIUM/WATER 2 G/20 ML
SYRINGE (ML) INTRAVENOUS
Status: DISPENSED
Start: 2024-01-04

## (undated) RX ORDER — SODIUM CHLORIDE, SODIUM LACTATE, POTASSIUM CHLORIDE, CALCIUM CHLORIDE 600; 310; 30; 20 MG/100ML; MG/100ML; MG/100ML; MG/100ML
INJECTION, SOLUTION INTRAVENOUS
Status: DISPENSED
Start: 2024-01-04

## (undated) RX ORDER — CEFAZOLIN SODIUM 2 G/100ML
INJECTION, SOLUTION INTRAVENOUS
Status: DISPENSED
Start: 2017-06-08